# Patient Record
Sex: FEMALE | Race: BLACK OR AFRICAN AMERICAN | NOT HISPANIC OR LATINO
[De-identification: names, ages, dates, MRNs, and addresses within clinical notes are randomized per-mention and may not be internally consistent; named-entity substitution may affect disease eponyms.]

---

## 2017-02-14 ENCOUNTER — APPOINTMENT (OUTPATIENT)
Dept: ELECTROPHYSIOLOGY | Facility: CLINIC | Age: 72
End: 2017-02-14

## 2017-03-27 ENCOUNTER — APPOINTMENT (OUTPATIENT)
Dept: CARDIOLOGY | Facility: CLINIC | Age: 72
End: 2017-03-27

## 2017-03-27 VITALS
BODY MASS INDEX: 32.57 KG/M2 | SYSTOLIC BLOOD PRESSURE: 130 MMHG | HEIGHT: 62 IN | HEART RATE: 93 BPM | WEIGHT: 177 LBS | OXYGEN SATURATION: 94 % | DIASTOLIC BLOOD PRESSURE: 78 MMHG

## 2017-03-27 DIAGNOSIS — Z86.79 PERSONAL HISTORY OF OTHER DISEASES OF THE CIRCULATORY SYSTEM: ICD-10-CM

## 2017-04-05 ENCOUNTER — INPATIENT (INPATIENT)
Facility: HOSPITAL | Age: 72
LOS: 1 days | Discharge: HOME | DRG: 202 | End: 2017-04-07
Attending: INTERNAL MEDICINE | Admitting: INTERNAL MEDICINE
Payer: MEDICARE

## 2017-04-05 VITALS
OXYGEN SATURATION: 95 % | HEART RATE: 102 BPM | RESPIRATION RATE: 22 BRPM | TEMPERATURE: 98 F | DIASTOLIC BLOOD PRESSURE: 87 MMHG | SYSTOLIC BLOOD PRESSURE: 157 MMHG

## 2017-04-05 DIAGNOSIS — J45.901 UNSPECIFIED ASTHMA WITH (ACUTE) EXACERBATION: ICD-10-CM

## 2017-04-05 DIAGNOSIS — I10 ESSENTIAL (PRIMARY) HYPERTENSION: ICD-10-CM

## 2017-04-05 DIAGNOSIS — Z41.8 ENCOUNTER FOR OTHER PROCEDURES FOR PURPOSES OTHER THAN REMEDYING HEALTH STATE: ICD-10-CM

## 2017-04-05 DIAGNOSIS — E11.9 TYPE 2 DIABETES MELLITUS WITHOUT COMPLICATIONS: ICD-10-CM

## 2017-04-05 DIAGNOSIS — I50.22 CHRONIC SYSTOLIC (CONGESTIVE) HEART FAILURE: ICD-10-CM

## 2017-04-05 LAB
ALBUMIN SERPL ELPH-MCNC: 4 G/DL — SIGNIFICANT CHANGE UP (ref 3.3–5)
ALP SERPL-CCNC: 110 U/L — SIGNIFICANT CHANGE UP (ref 40–120)
ALT FLD-CCNC: 15 U/L RC — SIGNIFICANT CHANGE UP (ref 10–45)
ANION GAP SERPL CALC-SCNC: 14 MMOL/L — SIGNIFICANT CHANGE UP (ref 5–17)
APTT BLD: 26.1 SEC — LOW (ref 27.5–37.4)
AST SERPL-CCNC: 17 U/L — SIGNIFICANT CHANGE UP (ref 10–40)
BASOPHILS # BLD AUTO: 0 K/UL — SIGNIFICANT CHANGE UP (ref 0–0.2)
BASOPHILS NFR BLD AUTO: 0 % — SIGNIFICANT CHANGE UP (ref 0–2)
BILIRUB SERPL-MCNC: 0.4 MG/DL — SIGNIFICANT CHANGE UP (ref 0.2–1.2)
BUN SERPL-MCNC: 23 MG/DL — SIGNIFICANT CHANGE UP (ref 7–23)
CALCIUM SERPL-MCNC: 9.4 MG/DL — SIGNIFICANT CHANGE UP (ref 8.4–10.5)
CHLORIDE SERPL-SCNC: 101 MMOL/L — SIGNIFICANT CHANGE UP (ref 96–108)
CO2 SERPL-SCNC: 23 MMOL/L — SIGNIFICANT CHANGE UP (ref 22–31)
CREAT SERPL-MCNC: 0.83 MG/DL — SIGNIFICANT CHANGE UP (ref 0.5–1.3)
EOSINOPHIL # BLD AUTO: 0 K/UL — SIGNIFICANT CHANGE UP (ref 0–0.5)
EOSINOPHIL NFR BLD AUTO: 0.1 % — SIGNIFICANT CHANGE UP (ref 0–6)
GAS PNL BLDV: SIGNIFICANT CHANGE UP
GLUCOSE SERPL-MCNC: 291 MG/DL — HIGH (ref 70–99)
HCT VFR BLD CALC: 37 % — SIGNIFICANT CHANGE UP (ref 34.5–45)
HGB BLD-MCNC: 12.3 G/DL — SIGNIFICANT CHANGE UP (ref 11.5–15.5)
INR BLD: 1.04 RATIO — SIGNIFICANT CHANGE UP (ref 0.88–1.16)
LYMPHOCYTES # BLD AUTO: 1.2 K/UL — SIGNIFICANT CHANGE UP (ref 1–3.3)
LYMPHOCYTES # BLD AUTO: 12.9 % — LOW (ref 13–44)
MAGNESIUM SERPL-MCNC: 1.9 MG/DL — SIGNIFICANT CHANGE UP (ref 1.6–2.6)
MCHC RBC-ENTMCNC: 32 PG — SIGNIFICANT CHANGE UP (ref 27–34)
MCHC RBC-ENTMCNC: 33.3 GM/DL — SIGNIFICANT CHANGE UP (ref 32–36)
MCV RBC AUTO: 96 FL — SIGNIFICANT CHANGE UP (ref 80–100)
MONOCYTES # BLD AUTO: 0.4 K/UL — SIGNIFICANT CHANGE UP (ref 0–0.9)
MONOCYTES NFR BLD AUTO: 3.7 % — SIGNIFICANT CHANGE UP (ref 2–14)
NEUTROPHILS # BLD AUTO: 7.9 K/UL — HIGH (ref 1.8–7.4)
NEUTROPHILS NFR BLD AUTO: 83.4 % — HIGH (ref 43–77)
NT-PROBNP SERPL-SCNC: 230 PG/ML — SIGNIFICANT CHANGE UP (ref 0–300)
PHOSPHATE SERPL-MCNC: 3.2 MG/DL — SIGNIFICANT CHANGE UP (ref 2.5–4.5)
PLATELET # BLD AUTO: 152 K/UL — SIGNIFICANT CHANGE UP (ref 150–400)
POTASSIUM SERPL-MCNC: 4.7 MMOL/L — SIGNIFICANT CHANGE UP (ref 3.5–5.3)
POTASSIUM SERPL-SCNC: 4.7 MMOL/L — SIGNIFICANT CHANGE UP (ref 3.5–5.3)
PROT SERPL-MCNC: 7.3 G/DL — SIGNIFICANT CHANGE UP (ref 6–8.3)
PROTHROM AB SERPL-ACNC: 11.2 SEC — SIGNIFICANT CHANGE UP (ref 9.8–12.7)
RAPID RVP RESULT: SIGNIFICANT CHANGE UP
RBC # BLD: 3.86 M/UL — SIGNIFICANT CHANGE UP (ref 3.8–5.2)
RBC # FLD: 13.7 % — SIGNIFICANT CHANGE UP (ref 10.3–14.5)
SODIUM SERPL-SCNC: 138 MMOL/L — SIGNIFICANT CHANGE UP (ref 135–145)
WBC # BLD: 9.5 K/UL — SIGNIFICANT CHANGE UP (ref 3.8–10.5)
WBC # FLD AUTO: 9.5 K/UL — SIGNIFICANT CHANGE UP (ref 3.8–10.5)

## 2017-04-05 PROCEDURE — 71010: CPT | Mod: 26

## 2017-04-05 PROCEDURE — 99285 EMERGENCY DEPT VISIT HI MDM: CPT

## 2017-04-05 PROCEDURE — 99223 1ST HOSP IP/OBS HIGH 75: CPT

## 2017-04-05 RX ORDER — DEXTROSE 50 % IN WATER 50 %
12.5 SYRINGE (ML) INTRAVENOUS ONCE
Qty: 0 | Refills: 0 | Status: DISCONTINUED | OUTPATIENT
Start: 2017-04-05 | End: 2017-04-07

## 2017-04-05 RX ORDER — ASPIRIN/CALCIUM CARB/MAGNESIUM 324 MG
81 TABLET ORAL DAILY
Qty: 0 | Refills: 0 | Status: DISCONTINUED | OUTPATIENT
Start: 2017-04-05 | End: 2017-04-07

## 2017-04-05 RX ORDER — FAMOTIDINE 10 MG/ML
20 INJECTION INTRAVENOUS DAILY
Qty: 0 | Refills: 0 | Status: DISCONTINUED | OUTPATIENT
Start: 2017-04-05 | End: 2017-04-07

## 2017-04-05 RX ORDER — IPRATROPIUM/ALBUTEROL SULFATE 18-103MCG
3 AEROSOL WITH ADAPTER (GRAM) INHALATION ONCE
Qty: 0 | Refills: 0 | Status: COMPLETED | OUTPATIENT
Start: 2017-04-05 | End: 2017-04-05

## 2017-04-05 RX ORDER — INSULIN LISPRO 100/ML
VIAL (ML) SUBCUTANEOUS
Qty: 0 | Refills: 0 | Status: DISCONTINUED | OUTPATIENT
Start: 2017-04-05 | End: 2017-04-07

## 2017-04-05 RX ORDER — SODIUM CHLORIDE 9 MG/ML
1000 INJECTION, SOLUTION INTRAVENOUS
Qty: 0 | Refills: 0 | Status: DISCONTINUED | OUTPATIENT
Start: 2017-04-05 | End: 2017-04-07

## 2017-04-05 RX ORDER — HEPARIN SODIUM 5000 [USP'U]/ML
5000 INJECTION INTRAVENOUS; SUBCUTANEOUS EVERY 12 HOURS
Qty: 0 | Refills: 0 | Status: DISCONTINUED | OUTPATIENT
Start: 2017-04-05 | End: 2017-04-07

## 2017-04-05 RX ORDER — ACETAMINOPHEN 500 MG
650 TABLET ORAL EVERY 6 HOURS
Qty: 0 | Refills: 0 | Status: DISCONTINUED | OUTPATIENT
Start: 2017-04-05 | End: 2017-04-07

## 2017-04-05 RX ORDER — ATORVASTATIN CALCIUM 80 MG/1
20 TABLET, FILM COATED ORAL AT BEDTIME
Qty: 0 | Refills: 0 | Status: DISCONTINUED | OUTPATIENT
Start: 2017-04-05 | End: 2017-04-07

## 2017-04-05 RX ORDER — IPRATROPIUM/ALBUTEROL SULFATE 18-103MCG
3 AEROSOL WITH ADAPTER (GRAM) INHALATION EVERY 6 HOURS
Qty: 0 | Refills: 0 | Status: DISCONTINUED | OUTPATIENT
Start: 2017-04-05 | End: 2017-04-06

## 2017-04-05 RX ORDER — TIOTROPIUM BROMIDE 18 UG/1
1 CAPSULE ORAL; RESPIRATORY (INHALATION) DAILY
Qty: 0 | Refills: 0 | Status: DISCONTINUED | OUTPATIENT
Start: 2017-04-05 | End: 2017-04-07

## 2017-04-05 RX ORDER — DEXAMETHASONE 0.5 MG/5ML
10 ELIXIR ORAL ONCE
Qty: 0 | Refills: 0 | Status: COMPLETED | OUTPATIENT
Start: 2017-04-05 | End: 2017-04-05

## 2017-04-05 RX ORDER — MAGNESIUM SULFATE 500 MG/ML
2 VIAL (ML) INJECTION ONCE
Qty: 0 | Refills: 0 | Status: COMPLETED | OUTPATIENT
Start: 2017-04-05 | End: 2017-04-05

## 2017-04-05 RX ORDER — ALBUTEROL 90 UG/1
2.5 AEROSOL, METERED ORAL
Qty: 0 | Refills: 0 | Status: COMPLETED | OUTPATIENT
Start: 2017-04-05 | End: 2017-04-05

## 2017-04-05 RX ORDER — INSULIN GLARGINE 100 [IU]/ML
6 INJECTION, SOLUTION SUBCUTANEOUS AT BEDTIME
Qty: 0 | Refills: 0 | Status: DISCONTINUED | OUTPATIENT
Start: 2017-04-05 | End: 2017-04-07

## 2017-04-05 RX ORDER — DEXTROSE 50 % IN WATER 50 %
25 SYRINGE (ML) INTRAVENOUS ONCE
Qty: 0 | Refills: 0 | Status: DISCONTINUED | OUTPATIENT
Start: 2017-04-05 | End: 2017-04-07

## 2017-04-05 RX ORDER — LORATADINE 10 MG/1
10 TABLET ORAL DAILY
Qty: 0 | Refills: 0 | Status: DISCONTINUED | OUTPATIENT
Start: 2017-04-05 | End: 2017-04-07

## 2017-04-05 RX ORDER — GLUCAGON INJECTION, SOLUTION 0.5 MG/.1ML
1 INJECTION, SOLUTION SUBCUTANEOUS ONCE
Qty: 0 | Refills: 0 | Status: DISCONTINUED | OUTPATIENT
Start: 2017-04-05 | End: 2017-04-07

## 2017-04-05 RX ORDER — DEXTROSE 50 % IN WATER 50 %
1 SYRINGE (ML) INTRAVENOUS ONCE
Qty: 0 | Refills: 0 | Status: DISCONTINUED | OUTPATIENT
Start: 2017-04-05 | End: 2017-04-07

## 2017-04-05 RX ADMIN — ALBUTEROL 2.5 MILLIGRAM(S): 90 AEROSOL, METERED ORAL at 16:39

## 2017-04-05 RX ADMIN — Medication 50 GRAM(S): at 17:00

## 2017-04-05 RX ADMIN — INSULIN GLARGINE 6 UNIT(S): 100 INJECTION, SOLUTION SUBCUTANEOUS at 22:27

## 2017-04-05 RX ADMIN — ALBUTEROL 2.5 MILLIGRAM(S): 90 AEROSOL, METERED ORAL at 16:52

## 2017-04-05 RX ADMIN — ALBUTEROL 2.5 MILLIGRAM(S): 90 AEROSOL, METERED ORAL at 17:00

## 2017-04-05 RX ADMIN — ATORVASTATIN CALCIUM 20 MILLIGRAM(S): 80 TABLET, FILM COATED ORAL at 22:28

## 2017-04-05 RX ADMIN — Medication 3 MILLILITER(S): at 15:56

## 2017-04-05 RX ADMIN — Medication 4: at 22:27

## 2017-04-05 RX ADMIN — Medication 3 MILLILITER(S): at 23:47

## 2017-04-05 RX ADMIN — Medication 102 MILLIGRAM(S): at 16:10

## 2017-04-05 RX ADMIN — Medication 10 MILLIGRAM(S): at 22:27

## 2017-04-05 RX ADMIN — HEPARIN SODIUM 5000 UNIT(S): 5000 INJECTION INTRAVENOUS; SUBCUTANEOUS at 22:28

## 2017-04-05 NOTE — H&P ADULT. - HISTORY OF PRESENT ILLNESS
Initial ED vitals--Afebrile,  157/87 22 98%   Labs notable for no leukocytosis WBC 9.5 Hb 12.3 Plt 152; BMP Cr 0.83 K 4.7 LFTs wnl; RVP negative; VBG with lactate 2.5 CO2 44  Imaging notable for CXR with R. sided volume loss with pleural thickening vs. effusion.     In the ED, she received     Admitted to medicine for further evaluation. 71 yo woman w/PMHx of sCHF, asthma s/p hx of respiratory failure s/p trach now reversed, DM, and HTN presenting with dyspnea for the past week. She reports noting notable wheezing and SOB since the weather has been frequently changing from hot to cold. She reports dyspnea over the past week with worsening over the past 2 days. She denied any associated CP, pleuritic CP, nausea/vomiting, lightheadedness, palpitations or syncope. She reports significant worsening over the 24hrs. She stated that she saw her pulmonologist yesterday and was started on prednisone 50mg and took 20mg today without significant improvement. She reported frequent albuterol use without improvement prompting her to present to the ED.      Initial ED vitals--Afebrile,  157/87 22 98%   Labs notable for no leukocytosis WBC 9.5 Hb 12.3 Plt 152; BMP Cr 0.83 K 4.7 LFTs wnl; RVP negative; VBG with lactate 2.5 CO2 44  Imaging notable for CXR with R. sided volume loss with pleural thickening vs. effusion.     In the ED, she received     Admitted to medicine for further evaluation.

## 2017-04-05 NOTE — H&P ADULT. - PSH
AICD (Automatic Cardioverter/Defibrillator) Present  inserted in Aug, 2008. Due for battery change in 1 month. ( [employer]) . Inserted by Dr Duffy  S/P Cholecystectomy    S/P cholecystectomy

## 2017-04-05 NOTE — H&P ADULT. - OTHER
Reviewed previous records in [place]--TTE done 5/15 demonstrated combined diastolic and systolic dysfunction EF 35%. Reviewed previous records in [facility]--TTE done 5/15 demonstrated combined diastolic and systolic dysfunction EF 35%. Patient reported that she is a [employer] and unable to receive any transfusion blood products. We also discussed her GOC--She reports that she is full code and would accept resuscitating efforts. She reports that if she was intubated and requiring mechanical support she would want it to be discontinued if recovery was thought to be prolonged or unlikely.

## 2017-04-05 NOTE — H&P ADULT. - PROBLEM SELECTOR PLAN 1
Unclear trigger. No leukocytosis, afebrile, HDS. RVP negative. CXR with R. sided volume loss with pleural thickening vs. pulm effusion.   - c/w prednisone 40mg qd   - Nebs RTC   - Supplemental O2 as needed  - Pt reportedly was going to follow up with Dr. Riley, pulm; consider consult if fails to improve

## 2017-04-05 NOTE — H&P ADULT. - PROBLEM SELECTOR PLAN 4
Pt follows with Dr. Hobson  - c/w OP HF regimen and monitor volume status  - Per patient she is scheduled to have TTE at next visit and is requesting possible echo while inpatient  - Notify Dr. Hobson of admission in am.

## 2017-04-05 NOTE — ED ADULT NURSE NOTE - OBJECTIVE STATEMENT
72y female pt, hx of asthma and intubation in the past, BIBA c/o worsening sob with wheezing. Pt states that pt recently started on Prednisone, home Neb was not helping. No fever/chills, no chest pain, b/l wheeze noted, pt received 1xduoneb by EMS. +cough with clear sputum.

## 2017-04-05 NOTE — ED PROVIDER NOTE - OBJECTIVE STATEMENT
72F hx asthma, hx respiratory failure sp trach and peg now reversed, bib son and daughter co difficulty breathing since beginning of this week, seen by pulmonologist yesterday started on prednisone took 50mg yesterday and 20mg today, used inhaler about 2 hours ago and still feeling diff breathing.  Given 1 neb by ems pta.    PMD Adv Care Logan Chaudhary 72F hx asthma, hx respiratory failure sp trach and peg now reversed, bib son and daughter co difficulty breathing since beginning of this week, seen by pulmonologist yesterday started on prednisone took 50mg yesterday and 20mg today, used inhaler about 2 hours ago and still feeling diff breathing.  Given 1 neb by ems pta.  No fever, no vomiting, no chest pain.      PMD Adv Care Logan Chaudhary

## 2017-04-05 NOTE — ED PROVIDER NOTE - PMH
Asthma    Cardiac Pacemaker    Cardiomyopathy    Congestive Heart Failure    COPD (chronic obstructive pulmonary disease)    Diabetes    Diverticulitis    Gout    HTN - Hypertension    Kidney stone

## 2017-04-05 NOTE — H&P ADULT. - CONSTITUTIONAL COMMENTS
elderly woman sitting up comfortably in no distress, able to speak in full sentences without difficulty

## 2017-04-05 NOTE — H&P ADULT. - RADIOLOGY RESULTS AND INTERPRETATION
Personally reviewed imaging. Imaging notable for CXR with R. sided volume loss with pleural thickening vs. effusion.

## 2017-04-05 NOTE — ED PROVIDER NOTE - ATTENDING CONTRIBUTION TO CARE
I have seen and evaluated this patient with the resident.   I agree with the findings  unless other wise stated.  I have made appropriate changes in documentations where needed, After my face to face bedside evaluation, I am further  noting: pt with long standing RAD had intubation last year wheezing not significant change,  admission for definitive care.-Fleming

## 2017-04-05 NOTE — H&P ADULT. - LAB RESULTS AND INTERPRETATION
Personally reviewed labs. Labs notable for no leukocytosis WBC 9.5 Hb 12.3 Plt 152; BMP Cr 0.83 K 4.7 LFTs wnl; RVP negative; VBG with lactate 2.5 CO2 44

## 2017-04-05 NOTE — ED PROVIDER NOTE - PSH
AICD (Automatic Cardioverter/Defibrillator) Present  inserted in Aug, 2008. Due for battery change in 1 month. ( [employer]) . Inserted by Dr Duffy  S/P Cholecystectomy    S/P cholecystectomy

## 2017-04-06 ENCOUNTER — TRANSCRIPTION ENCOUNTER (OUTPATIENT)
Age: 72
End: 2017-04-06

## 2017-04-06 LAB
ANION GAP SERPL CALC-SCNC: 15 MMOL/L — SIGNIFICANT CHANGE UP (ref 5–17)
BASOPHILS # BLD AUTO: 0.01 K/UL — SIGNIFICANT CHANGE UP (ref 0–0.2)
BASOPHILS NFR BLD AUTO: 0.1 % — SIGNIFICANT CHANGE UP (ref 0–2)
BUN SERPL-MCNC: 23 MG/DL — SIGNIFICANT CHANGE UP (ref 7–23)
CALCIUM SERPL-MCNC: 8.9 MG/DL — SIGNIFICANT CHANGE UP (ref 8.4–10.5)
CHLORIDE SERPL-SCNC: 97 MMOL/L — SIGNIFICANT CHANGE UP (ref 96–108)
CO2 SERPL-SCNC: 23 MMOL/L — SIGNIFICANT CHANGE UP (ref 22–31)
CREAT SERPL-MCNC: 0.81 MG/DL — SIGNIFICANT CHANGE UP (ref 0.5–1.3)
EOSINOPHIL # BLD AUTO: 0 K/UL — SIGNIFICANT CHANGE UP (ref 0–0.5)
EOSINOPHIL NFR BLD AUTO: 0 % — SIGNIFICANT CHANGE UP (ref 0–6)
GLUCOSE SERPL-MCNC: 324 MG/DL — HIGH (ref 70–99)
HBA1C BLD-MCNC: 8 % — HIGH (ref 4–5.6)
HCT VFR BLD CALC: 32.9 % — LOW (ref 34.5–45)
HGB BLD-MCNC: 10.9 G/DL — LOW (ref 11.5–15.5)
IMM GRANULOCYTES NFR BLD AUTO: 0.2 % — SIGNIFICANT CHANGE UP (ref 0–1.5)
LYMPHOCYTES # BLD AUTO: 0.97 K/UL — LOW (ref 1–3.3)
LYMPHOCYTES # BLD AUTO: 11.2 % — LOW (ref 13–44)
MAGNESIUM SERPL-MCNC: 2.1 MG/DL — SIGNIFICANT CHANGE UP (ref 1.6–2.6)
MCHC RBC-ENTMCNC: 31.1 PG — SIGNIFICANT CHANGE UP (ref 27–34)
MCHC RBC-ENTMCNC: 33.1 GM/DL — SIGNIFICANT CHANGE UP (ref 32–36)
MCV RBC AUTO: 93.7 FL — SIGNIFICANT CHANGE UP (ref 80–100)
MONOCYTES # BLD AUTO: 0.36 K/UL — SIGNIFICANT CHANGE UP (ref 0–0.9)
MONOCYTES NFR BLD AUTO: 4.2 % — SIGNIFICANT CHANGE UP (ref 2–14)
NEUTROPHILS # BLD AUTO: 7.29 K/UL — SIGNIFICANT CHANGE UP (ref 1.8–7.4)
NEUTROPHILS NFR BLD AUTO: 84.3 % — HIGH (ref 43–77)
PLATELET # BLD AUTO: 168 K/UL — SIGNIFICANT CHANGE UP (ref 150–400)
POTASSIUM SERPL-MCNC: 4.6 MMOL/L — SIGNIFICANT CHANGE UP (ref 3.5–5.3)
POTASSIUM SERPL-SCNC: 4.6 MMOL/L — SIGNIFICANT CHANGE UP (ref 3.5–5.3)
RBC # BLD: 3.51 M/UL — LOW (ref 3.8–5.2)
RBC # FLD: 14.7 % — HIGH (ref 10.3–14.5)
SODIUM SERPL-SCNC: 135 MMOL/L — SIGNIFICANT CHANGE UP (ref 135–145)
WBC # BLD: 8.65 K/UL — SIGNIFICANT CHANGE UP (ref 3.8–10.5)
WBC # FLD AUTO: 8.65 K/UL — SIGNIFICANT CHANGE UP (ref 3.8–10.5)

## 2017-04-06 RX ORDER — IPRATROPIUM/ALBUTEROL SULFATE 18-103MCG
3 AEROSOL WITH ADAPTER (GRAM) INHALATION ONCE
Qty: 0 | Refills: 0 | Status: COMPLETED | OUTPATIENT
Start: 2017-04-06 | End: 2017-04-06

## 2017-04-06 RX ORDER — TIOTROPIUM BROMIDE 18 UG/1
1 CAPSULE ORAL; RESPIRATORY (INHALATION) DAILY
Qty: 0 | Refills: 0 | Status: DISCONTINUED | OUTPATIENT
Start: 2017-04-06 | End: 2017-04-06

## 2017-04-06 RX ORDER — ALBUTEROL 90 UG/1
1 AEROSOL, METERED ORAL EVERY 4 HOURS
Qty: 0 | Refills: 0 | Status: DISCONTINUED | OUTPATIENT
Start: 2017-04-06 | End: 2017-04-06

## 2017-04-06 RX ORDER — FLUTICASONE PROPIONATE AND SALMETEROL 50; 250 UG/1; UG/1
1 POWDER ORAL; RESPIRATORY (INHALATION)
Qty: 0 | Refills: 0 | Status: DISCONTINUED | OUTPATIENT
Start: 2017-04-06 | End: 2017-04-07

## 2017-04-06 RX ORDER — IPRATROPIUM/ALBUTEROL SULFATE 18-103MCG
3 AEROSOL WITH ADAPTER (GRAM) INHALATION EVERY 6 HOURS
Qty: 0 | Refills: 0 | Status: DISCONTINUED | OUTPATIENT
Start: 2017-04-06 | End: 2017-04-07

## 2017-04-06 RX ADMIN — Medication 3 MILLILITER(S): at 14:39

## 2017-04-06 RX ADMIN — HEPARIN SODIUM 5000 UNIT(S): 5000 INJECTION INTRAVENOUS; SUBCUTANEOUS at 17:13

## 2017-04-06 RX ADMIN — Medication 10 MILLIGRAM(S): at 05:23

## 2017-04-06 RX ADMIN — Medication 4: at 17:07

## 2017-04-06 RX ADMIN — Medication 81 MILLIGRAM(S): at 11:51

## 2017-04-06 RX ADMIN — Medication 10 MILLIGRAM(S): at 17:13

## 2017-04-06 RX ADMIN — Medication 2: at 11:52

## 2017-04-06 RX ADMIN — INSULIN GLARGINE 6 UNIT(S): 100 INJECTION, SOLUTION SUBCUTANEOUS at 21:29

## 2017-04-06 RX ADMIN — Medication 3: at 08:17

## 2017-04-06 RX ADMIN — Medication 30 MILLIGRAM(S): at 10:59

## 2017-04-06 RX ADMIN — ATORVASTATIN CALCIUM 20 MILLIGRAM(S): 80 TABLET, FILM COATED ORAL at 21:28

## 2017-04-06 RX ADMIN — Medication 3 MILLILITER(S): at 21:28

## 2017-04-06 RX ADMIN — Medication 30 MILLIGRAM(S): at 21:28

## 2017-04-06 RX ADMIN — FLUTICASONE PROPIONATE AND SALMETEROL 1 DOSE(S): 50; 250 POWDER ORAL; RESPIRATORY (INHALATION) at 17:13

## 2017-04-06 RX ADMIN — Medication 3 MILLILITER(S): at 10:15

## 2017-04-06 RX ADMIN — HEPARIN SODIUM 5000 UNIT(S): 5000 INJECTION INTRAVENOUS; SUBCUTANEOUS at 05:23

## 2017-04-06 RX ADMIN — LORATADINE 10 MILLIGRAM(S): 10 TABLET ORAL at 11:51

## 2017-04-06 RX ADMIN — FAMOTIDINE 20 MILLIGRAM(S): 10 INJECTION INTRAVENOUS at 11:51

## 2017-04-06 NOTE — DISCHARGE NOTE ADULT - PLAN OF CARE
resolution of symptoms Asthma attacks happen when the airways in the lungs become narrow and inflamed  Asthma symptoms can include - Wheezing or noisy breathing, coughing, tight feeling in the chest, shortness of breath  Almost everyone with asthma has a quick-relief inhaler that works in 5- 10mins that they carry with them and long-term controller medicines control asthma and prevent future symptoms. People with frequent asthma symptoms take these 1 or 2 times each day.  You can stay away from things that cause your symptoms or make them worse. Doctors call these "triggers."  avoid them as much as possible. Some common triggers include - Dust, mold, animals, such as dogs and cats, pollen and plants, cigarette smoke, getting sick with a cold or flu (that's why it's important to get a flu shot), stress  Talk to your caregiver about an action plan for managing asthma attacks. This includes the use of a peak flow meter which measures the severity of the attack and medicines that can help stop the attack.   SEEK MEDICAL CARE IF:  You have wheezing, shortness of breath, or a cough even if taking medicine to prevent attacks.   You have thickening of sputum, sputum changes from clear or white to yellow, green, gray, or bloody.   You have any problems that may be related to the medicines you are taking (such as a rash, itching, swelling, or trouble breathing).  You are using a reliever medicine more than 2–3 times per week.  Your peak flow is still at 50–79% of personal best after following your action plan for 1 hour.  SEEK IMMEDIATE MEDICAL CARE IF:  You are short of breath even at rest,or with very little physical activity, chest pain, heart beating fast, bluish color to your lips or fingernails, fever, dizziness,   You seem to be getting worse and are unresponsive to treatment during an asthma attack.   Your peak flow is less than 50% of personal best. HgA1C this admission - 8.0  Make sure you get your HgA1c checked every three months.  If you take oral diabetes medications, check your blood glucose two times a day.  If you take insulin, check your blood glucose before meals and at bedtime.  It's important not to skip any meals.  Keep a log of your blood glucose results and always take it with you to your doctor appointments.  Keep a list of your current medications including injectables and over the counter medications and bring this medication list with you to all your doctor appointments.  If you have not seen your ophthalmologist this year call for appointment.  Check your feet daily for redness, sores, or openings. Do not self treat. If no improvement in two days call your primary care physician for an appointment.  Low blood sugar (hypoglycemia) is a blood sugar below 70mg/dl. Check your blood sugar if you feel signs/symptoms of hypoglycemia. If your blood sugar is below 70 take 15 grams of carbohydrates (ex 4 oz of apple juice, 3-4 glucose tablets, or 4-6 oz of regular soda) wait 15 minutes and repeat blood sugar to make sure it comes up above 70.  If your blood sugar is above 70 and you are due for a meal, have a meal.  If you are not due for a meal have a snack.  This snack helps keeps your blood sugar at a safe range. Take medications for your blood pressure as recommended.  Eat a heart healthy diet that is low in saturated fats and salt, and includes whole grains, fruits, vegetables and lean protein   Exercise regularly (consult with your physician or cardiologist first); maintain a heart healthy weight.   If you smoke - quit (A resource to help you stop smoking is the [facility] – phone number 888-761-0722.). Continue to follow with your primary physician or cardiologist.   Seek medical help for dizziness, Lightheadedness, Blurry vision, Headache, Chest pain, Shortness of breath  Follow up with your medical doctor to establish long term blood pressure treatment goals. Weigh yourself daily.  If you gain 3lbs in 3 days, or 5lbs in a week call your Health Care Provider.  Do not eat or drink foods containing more than 2000mg of salt (sodium) in your diet every day.  Call your Health Care Provider if you have any swelling or increased swelling in your feet, ankles, and/or stomach.  Take all of your medication as directed.  If you become dizzy call your Health Care Provider.

## 2017-04-06 NOTE — DISCHARGE NOTE ADULT - HOSPITAL COURSE
to be completed by attending 73 yo woman w/PMHx of sCHF, asthma s/p hx of respiratory failure s/p trach now reversed, DM, and HTN presenting with dyspnea for the past week. She reports noting notable wheezing and SOB since the weather has been frequently changing from hot to cold. She reports dyspnea over the past week with worsening over the past 2 days. She denied any associated CP, pleuritic CP, nausea/vomiting, lightheadedness, palpitations or syncope. She reports significant worsening over the 24hrs. She stated that she saw her pulmonologist yesterday and was started on prednisone 50mg and took 20mg today without significant improvement. She reported frequent albuterol use without improvement prompting her to present to the ED.   stable on po prednisone, dc planning

## 2017-04-06 NOTE — DISCHARGE NOTE ADULT - CARE PLAN
Principal Discharge DX:	Asthma exacerbation  Goal:	resolution of symptoms  Instructions for follow-up, activity and diet:	Asthma attacks happen when the airways in the lungs become narrow and inflamed  Asthma symptoms can include - Wheezing or noisy breathing, coughing, tight feeling in the chest, shortness of breath  Almost everyone with asthma has a quick-relief inhaler that works in 5- 10mins that they carry with them and long-term controller medicines control asthma and prevent future symptoms. People with frequent asthma symptoms take these 1 or 2 times each day.  You can stay away from things that cause your symptoms or make them worse. Doctors call these "triggers."  avoid them as much as possible. Some common triggers include - Dust, mold, animals, such as dogs and cats, pollen and plants, cigarette smoke, getting sick with a cold or flu (that's why it's important to get a flu shot), stress  Talk to your caregiver about an action plan for managing asthma attacks. This includes the use of a peak flow meter which measures the severity of the attack and medicines that can help stop the attack.   SEEK MEDICAL CARE IF:  You have wheezing, shortness of breath, or a cough even if taking medicine to prevent attacks.   You have thickening of sputum, sputum changes from clear or white to yellow, green, gray, or bloody.   You have any problems that may be related to the medicines you are taking (such as a rash, itching, swelling, or trouble breathing).  You are using a reliever medicine more than 2–3 times per week.  Your peak flow is still at 50–79% of personal best after following your action plan for 1 hour.  SEEK IMMEDIATE MEDICAL CARE IF:  You are short of breath even at rest,or with very little physical activity, chest pain, heart beating fast, bluish color to your lips or fingernails, fever, dizziness,   You seem to be getting worse and are unresponsive to treatment during an asthma attack.   Your peak flow is less than 50% of personal best.  Secondary Diagnosis:	Essential hypertension  Instructions for follow-up, activity and diet:	Take medications for your blood pressure as recommended.  Eat a heart healthy diet that is low in saturated fats and salt, and includes whole grains, fruits, vegetables and lean protein   Exercise regularly (consult with your physician or cardiologist first); maintain a heart healthy weight.   If you smoke - quit (A resource to help you stop smoking is the [facility] – phone number 137-724-3780.). Continue to follow with your primary physician or cardiologist.   Seek medical help for dizziness, Lightheadedness, Blurry vision, Headache, Chest pain, Shortness of breath  Follow up with your medical doctor to establish long term blood pressure treatment goals.  Secondary Diagnosis:	Type 2 diabetes mellitus without complication, with long-term current use of insulin  Instructions for follow-up, activity and diet:	HgA1C this admission - 8.0  Make sure you get your HgA1c checked every three months.  If you take oral diabetes medications, check your blood glucose two times a day.  If you take insulin, check your blood glucose before meals and at bedtime.  It's important not to skip any meals.  Keep a log of your blood glucose results and always take it with you to your doctor appointments.  Keep a list of your current medications including injectables and over the counter medications and bring this medication list with you to all your doctor appointments.  If you have not seen your ophthalmologist this year call for appointment.  Check your feet daily for redness, sores, or openings. Do not self treat. If no improvement in two days call your primary care physician for an appointment.  Low blood sugar (hypoglycemia) is a blood sugar below 70mg/dl. Check your blood sugar if you feel signs/symptoms of hypoglycemia. If your blood sugar is below 70 take 15 grams of carbohydrates (ex 4 oz of apple juice, 3-4 glucose tablets, or 4-6 oz of regular soda) wait 15 minutes and repeat blood sugar to make sure it comes up above 70.  If your blood sugar is above 70 and you are due for a meal, have a meal.  If you are not due for a meal have a snack.  This snack helps keeps your blood sugar at a safe range. Principal Discharge DX:	Asthma exacerbation  Goal:	resolution of symptoms  Instructions for follow-up, activity and diet:	Asthma attacks happen when the airways in the lungs become narrow and inflamed  Asthma symptoms can include - Wheezing or noisy breathing, coughing, tight feeling in the chest, shortness of breath  Almost everyone with asthma has a quick-relief inhaler that works in 5- 10mins that they carry with them and long-term controller medicines control asthma and prevent future symptoms. People with frequent asthma symptoms take these 1 or 2 times each day.  You can stay away from things that cause your symptoms or make them worse. Doctors call these "triggers."  avoid them as much as possible. Some common triggers include - Dust, mold, animals, such as dogs and cats, pollen and plants, cigarette smoke, getting sick with a cold or flu (that's why it's important to get a flu shot), stress  Talk to your caregiver about an action plan for managing asthma attacks. This includes the use of a peak flow meter which measures the severity of the attack and medicines that can help stop the attack.   SEEK MEDICAL CARE IF:  You have wheezing, shortness of breath, or a cough even if taking medicine to prevent attacks.   You have thickening of sputum, sputum changes from clear or white to yellow, green, gray, or bloody.   You have any problems that may be related to the medicines you are taking (such as a rash, itching, swelling, or trouble breathing).  You are using a reliever medicine more than 2–3 times per week.  Your peak flow is still at 50–79% of personal best after following your action plan for 1 hour.  SEEK IMMEDIATE MEDICAL CARE IF:  You are short of breath even at rest,or with very little physical activity, chest pain, heart beating fast, bluish color to your lips or fingernails, fever, dizziness,   You seem to be getting worse and are unresponsive to treatment during an asthma attack.   Your peak flow is less than 50% of personal best.  Secondary Diagnosis:	Essential hypertension  Instructions for follow-up, activity and diet:	Take medications for your blood pressure as recommended.  Eat a heart healthy diet that is low in saturated fats and salt, and includes whole grains, fruits, vegetables and lean protein   Exercise regularly (consult with your physician or cardiologist first); maintain a heart healthy weight.   If you smoke - quit (A resource to help you stop smoking is the [facility] – phone number 519-460-6477.). Continue to follow with your primary physician or cardiologist.   Seek medical help for dizziness, Lightheadedness, Blurry vision, Headache, Chest pain, Shortness of breath  Follow up with your medical doctor to establish long term blood pressure treatment goals.  Secondary Diagnosis:	Type 2 diabetes mellitus without complication, with long-term current use of insulin  Instructions for follow-up, activity and diet:	HgA1C this admission - 8.0  Make sure you get your HgA1c checked every three months.  If you take oral diabetes medications, check your blood glucose two times a day.  If you take insulin, check your blood glucose before meals and at bedtime.  It's important not to skip any meals.  Keep a log of your blood glucose results and always take it with you to your doctor appointments.  Keep a list of your current medications including injectables and over the counter medications and bring this medication list with you to all your doctor appointments.  If you have not seen your ophthalmologist this year call for appointment.  Check your feet daily for redness, sores, or openings. Do not self treat. If no improvement in two days call your primary care physician for an appointment.  Low blood sugar (hypoglycemia) is a blood sugar below 70mg/dl. Check your blood sugar if you feel signs/symptoms of hypoglycemia. If your blood sugar is below 70 take 15 grams of carbohydrates (ex 4 oz of apple juice, 3-4 glucose tablets, or 4-6 oz of regular soda) wait 15 minutes and repeat blood sugar to make sure it comes up above 70.  If your blood sugar is above 70 and you are due for a meal, have a meal.  If you are not due for a meal have a snack.  This snack helps keeps your blood sugar at a safe range. Principal Discharge DX:	Asthma exacerbation  Goal:	resolution of symptoms  Instructions for follow-up, activity and diet:	Asthma attacks happen when the airways in the lungs become narrow and inflamed  Asthma symptoms can include - Wheezing or noisy breathing, coughing, tight feeling in the chest, shortness of breath  Almost everyone with asthma has a quick-relief inhaler that works in 5- 10mins that they carry with them and long-term controller medicines control asthma and prevent future symptoms. People with frequent asthma symptoms take these 1 or 2 times each day.  You can stay away from things that cause your symptoms or make them worse. Doctors call these "triggers."  avoid them as much as possible. Some common triggers include - Dust, mold, animals, such as dogs and cats, pollen and plants, cigarette smoke, getting sick with a cold or flu (that's why it's important to get a flu shot), stress  Talk to your caregiver about an action plan for managing asthma attacks. This includes the use of a peak flow meter which measures the severity of the attack and medicines that can help stop the attack.   SEEK MEDICAL CARE IF:  You have wheezing, shortness of breath, or a cough even if taking medicine to prevent attacks.   You have thickening of sputum, sputum changes from clear or white to yellow, green, gray, or bloody.   You have any problems that may be related to the medicines you are taking (such as a rash, itching, swelling, or trouble breathing).  You are using a reliever medicine more than 2–3 times per week.  Your peak flow is still at 50–79% of personal best after following your action plan for 1 hour.  SEEK IMMEDIATE MEDICAL CARE IF:  You are short of breath even at rest,or with very little physical activity, chest pain, heart beating fast, bluish color to your lips or fingernails, fever, dizziness,   You seem to be getting worse and are unresponsive to treatment during an asthma attack.   Your peak flow is less than 50% of personal best.  Secondary Diagnosis:	Essential hypertension  Instructions for follow-up, activity and diet:	Take medications for your blood pressure as recommended.  Eat a heart healthy diet that is low in saturated fats and salt, and includes whole grains, fruits, vegetables and lean protein   Exercise regularly (consult with your physician or cardiologist first); maintain a heart healthy weight.   If you smoke - quit (A resource to help you stop smoking is the [facility] – phone number [phone].). Continue to follow with your primary physician or cardiologist.   Seek medical help for dizziness, Lightheadedness, Blurry vision, Headache, Chest pain, Shortness of breath  Follow up with your medical doctor to establish long term blood pressure treatment goals.  Secondary Diagnosis:	Type 2 diabetes mellitus without complication, with long-term current use of insulin  Instructions for follow-up, activity and diet:	HgA1C this admission - 8.0  Make sure you get your HgA1c checked every three months.  If you take oral diabetes medications, check your blood glucose two times a day.  If you take insulin, check your blood glucose before meals and at bedtime.  It's important not to skip any meals.  Keep a log of your blood glucose results and always take it with you to your doctor appointments.  Keep a list of your current medications including injectables and over the counter medications and bring this medication list with you to all your doctor appointments.  If you have not seen your ophthalmologist this year call for appointment.  Check your feet daily for redness, sores, or openings. Do not self treat. If no improvement in two days call your primary care physician for an appointment.  Low blood sugar (hypoglycemia) is a blood sugar below 70mg/dl. Check your blood sugar if you feel signs/symptoms of hypoglycemia. If your blood sugar is below 70 take 15 grams of carbohydrates (ex 4 oz of apple juice, 3-4 glucose tablets, or 4-6 oz of regular soda) wait 15 minutes and repeat blood sugar to make sure it comes up above 70.  If your blood sugar is above 70 and you are due for a meal, have a meal.  If you are not due for a meal have a snack.  This snack helps keeps your blood sugar at a safe range. Principal Discharge DX:	Asthma exacerbation  Goal:	resolution of symptoms  Instructions for follow-up, activity and diet:	Asthma attacks happen when the airways in the lungs become narrow and inflamed  Asthma symptoms can include - Wheezing or noisy breathing, coughing, tight feeling in the chest, shortness of breath  Almost everyone with asthma has a quick-relief inhaler that works in 5- 10mins that they carry with them and long-term controller medicines control asthma and prevent future symptoms. People with frequent asthma symptoms take these 1 or 2 times each day.  You can stay away from things that cause your symptoms or make them worse. Doctors call these "triggers."  avoid them as much as possible. Some common triggers include - Dust, mold, animals, such as dogs and cats, pollen and plants, cigarette smoke, getting sick with a cold or flu (that's why it's important to get a flu shot), stress  Talk to your caregiver about an action plan for managing asthma attacks. This includes the use of a peak flow meter which measures the severity of the attack and medicines that can help stop the attack.   SEEK MEDICAL CARE IF:  You have wheezing, shortness of breath, or a cough even if taking medicine to prevent attacks.   You have thickening of sputum, sputum changes from clear or white to yellow, green, gray, or bloody.   You have any problems that may be related to the medicines you are taking (such as a rash, itching, swelling, or trouble breathing).  You are using a reliever medicine more than 2–3 times per week.  Your peak flow is still at 50–79% of personal best after following your action plan for 1 hour.  SEEK IMMEDIATE MEDICAL CARE IF:  You are short of breath even at rest,or with very little physical activity, chest pain, heart beating fast, bluish color to your lips or fingernails, fever, dizziness,   You seem to be getting worse and are unresponsive to treatment during an asthma attack.   Your peak flow is less than 50% of personal best.  Secondary Diagnosis:	Essential hypertension  Instructions for follow-up, activity and diet:	Take medications for your blood pressure as recommended.  Eat a heart healthy diet that is low in saturated fats and salt, and includes whole grains, fruits, vegetables and lean protein   Exercise regularly (consult with your physician or cardiologist first); maintain a heart healthy weight.   If you smoke - quit (A resource to help you stop smoking is the [facility] – phone number 462-860-8366.). Continue to follow with your primary physician or cardiologist.   Seek medical help for dizziness, Lightheadedness, Blurry vision, Headache, Chest pain, Shortness of breath  Follow up with your medical doctor to establish long term blood pressure treatment goals.  Secondary Diagnosis:	Type 2 diabetes mellitus without complication, with long-term current use of insulin  Instructions for follow-up, activity and diet:	HgA1C this admission - 8.0  Make sure you get your HgA1c checked every three months.  If you take oral diabetes medications, check your blood glucose two times a day.  If you take insulin, check your blood glucose before meals and at bedtime.  It's important not to skip any meals.  Keep a log of your blood glucose results and always take it with you to your doctor appointments.  Keep a list of your current medications including injectables and over the counter medications and bring this medication list with you to all your doctor appointments.  If you have not seen your ophthalmologist this year call for appointment.  Check your feet daily for redness, sores, or openings. Do not self treat. If no improvement in two days call your primary care physician for an appointment.  Low blood sugar (hypoglycemia) is a blood sugar below 70mg/dl. Check your blood sugar if you feel signs/symptoms of hypoglycemia. If your blood sugar is below 70 take 15 grams of carbohydrates (ex 4 oz of apple juice, 3-4 glucose tablets, or 4-6 oz of regular soda) wait 15 minutes and repeat blood sugar to make sure it comes up above 70.  If your blood sugar is above 70 and you are due for a meal, have a meal.  If you are not due for a meal have a snack.  This snack helps keeps your blood sugar at a safe range.  Secondary Diagnosis:	Chronic systolic congestive heart failure  Instructions for follow-up, activity and diet:	Weigh yourself daily.  If you gain 3lbs in 3 days, or 5lbs in a week call your Health Care Provider.  Do not eat or drink foods containing more than 2000mg of salt (sodium) in your diet every day.  Call your Health Care Provider if you have any swelling or increased swelling in your feet, ankles, and/or stomach.  Take all of your medication as directed.  If you become dizzy call your Health Care Provider. Principal Discharge DX:	Asthma exacerbation  Goal:	resolution of symptoms  Instructions for follow-up, activity and diet:	Asthma attacks happen when the airways in the lungs become narrow and inflamed  Asthma symptoms can include - Wheezing or noisy breathing, coughing, tight feeling in the chest, shortness of breath  Almost everyone with asthma has a quick-relief inhaler that works in 5- 10mins that they carry with them and long-term controller medicines control asthma and prevent future symptoms. People with frequent asthma symptoms take these 1 or 2 times each day.  You can stay away from things that cause your symptoms or make them worse. Doctors call these "triggers."  avoid them as much as possible. Some common triggers include - Dust, mold, animals, such as dogs and cats, pollen and plants, cigarette smoke, getting sick with a cold or flu (that's why it's important to get a flu shot), stress  Talk to your caregiver about an action plan for managing asthma attacks. This includes the use of a peak flow meter which measures the severity of the attack and medicines that can help stop the attack.   SEEK MEDICAL CARE IF:  You have wheezing, shortness of breath, or a cough even if taking medicine to prevent attacks.   You have thickening of sputum, sputum changes from clear or white to yellow, green, gray, or bloody.   You have any problems that may be related to the medicines you are taking (such as a rash, itching, swelling, or trouble breathing).  You are using a reliever medicine more than 2–3 times per week.  Your peak flow is still at 50–79% of personal best after following your action plan for 1 hour.  SEEK IMMEDIATE MEDICAL CARE IF:  You are short of breath even at rest,or with very little physical activity, chest pain, heart beating fast, bluish color to your lips or fingernails, fever, dizziness,   You seem to be getting worse and are unresponsive to treatment during an asthma attack.   Your peak flow is less than 50% of personal best.  Secondary Diagnosis:	Essential hypertension  Instructions for follow-up, activity and diet:	Take medications for your blood pressure as recommended.  Eat a heart healthy diet that is low in saturated fats and salt, and includes whole grains, fruits, vegetables and lean protein   Exercise regularly (consult with your physician or cardiologist first); maintain a heart healthy weight.   If you smoke - quit (A resource to help you stop smoking is the [facility] – phone number 719-841-7403.). Continue to follow with your primary physician or cardiologist.   Seek medical help for dizziness, Lightheadedness, Blurry vision, Headache, Chest pain, Shortness of breath  Follow up with your medical doctor to establish long term blood pressure treatment goals.  Secondary Diagnosis:	Type 2 diabetes mellitus without complication, with long-term current use of insulin  Instructions for follow-up, activity and diet:	HgA1C this admission - 8.0  Make sure you get your HgA1c checked every three months.  If you take oral diabetes medications, check your blood glucose two times a day.  If you take insulin, check your blood glucose before meals and at bedtime.  It's important not to skip any meals.  Keep a log of your blood glucose results and always take it with you to your doctor appointments.  Keep a list of your current medications including injectables and over the counter medications and bring this medication list with you to all your doctor appointments.  If you have not seen your ophthalmologist this year call for appointment.  Check your feet daily for redness, sores, or openings. Do not self treat. If no improvement in two days call your primary care physician for an appointment.  Low blood sugar (hypoglycemia) is a blood sugar below 70mg/dl. Check your blood sugar if you feel signs/symptoms of hypoglycemia. If your blood sugar is below 70 take 15 grams of carbohydrates (ex 4 oz of apple juice, 3-4 glucose tablets, or 4-6 oz of regular soda) wait 15 minutes and repeat blood sugar to make sure it comes up above 70.  If your blood sugar is above 70 and you are due for a meal, have a meal.  If you are not due for a meal have a snack.  This snack helps keeps your blood sugar at a safe range.  Secondary Diagnosis:	Chronic systolic congestive heart failure  Instructions for follow-up, activity and diet:	Weigh yourself daily.  If you gain 3lbs in 3 days, or 5lbs in a week call your Health Care Provider.  Do not eat or drink foods containing more than 2000mg of salt (sodium) in your diet every day.  Call your Health Care Provider if you have any swelling or increased swelling in your feet, ankles, and/or stomach.  Take all of your medication as directed.  If you become dizzy call your Health Care Provider. Principal Discharge DX:	Asthma exacerbation  Goal:	resolution of symptoms  Instructions for follow-up, activity and diet:	Asthma attacks happen when the airways in the lungs become narrow and inflamed  Asthma symptoms can include - Wheezing or noisy breathing, coughing, tight feeling in the chest, shortness of breath  Almost everyone with asthma has a quick-relief inhaler that works in 5- 10mins that they carry with them and long-term controller medicines control asthma and prevent future symptoms. People with frequent asthma symptoms take these 1 or 2 times each day.  You can stay away from things that cause your symptoms or make them worse. Doctors call these "triggers."  avoid them as much as possible. Some common triggers include - Dust, mold, animals, such as dogs and cats, pollen and plants, cigarette smoke, getting sick with a cold or flu (that's why it's important to get a flu shot), stress  Talk to your caregiver about an action plan for managing asthma attacks. This includes the use of a peak flow meter which measures the severity of the attack and medicines that can help stop the attack.   SEEK MEDICAL CARE IF:  You have wheezing, shortness of breath, or a cough even if taking medicine to prevent attacks.   You have thickening of sputum, sputum changes from clear or white to yellow, green, gray, or bloody.   You have any problems that may be related to the medicines you are taking (such as a rash, itching, swelling, or trouble breathing).  You are using a reliever medicine more than 2–3 times per week.  Your peak flow is still at 50–79% of personal best after following your action plan for 1 hour.  SEEK IMMEDIATE MEDICAL CARE IF:  You are short of breath even at rest,or with very little physical activity, chest pain, heart beating fast, bluish color to your lips or fingernails, fever, dizziness,   You seem to be getting worse and are unresponsive to treatment during an asthma attack.   Your peak flow is less than 50% of personal best.  Secondary Diagnosis:	Essential hypertension  Instructions for follow-up, activity and diet:	Take medications for your blood pressure as recommended.  Eat a heart healthy diet that is low in saturated fats and salt, and includes whole grains, fruits, vegetables and lean protein   Exercise regularly (consult with your physician or cardiologist first); maintain a heart healthy weight.   If you smoke - quit (A resource to help you stop smoking is the [facility] – phone number [phone].). Continue to follow with your primary physician or cardiologist.   Seek medical help for dizziness, Lightheadedness, Blurry vision, Headache, Chest pain, Shortness of breath  Follow up with your medical doctor to establish long term blood pressure treatment goals.  Secondary Diagnosis:	Type 2 diabetes mellitus without complication, with long-term current use of insulin  Instructions for follow-up, activity and diet:	HgA1C this admission - 8.0  Make sure you get your HgA1c checked every three months.  If you take oral diabetes medications, check your blood glucose two times a day.  If you take insulin, check your blood glucose before meals and at bedtime.  It's important not to skip any meals.  Keep a log of your blood glucose results and always take it with you to your doctor appointments.  Keep a list of your current medications including injectables and over the counter medications and bring this medication list with you to all your doctor appointments.  If you have not seen your ophthalmologist this year call for appointment.  Check your feet daily for redness, sores, or openings. Do not self treat. If no improvement in two days call your primary care physician for an appointment.  Low blood sugar (hypoglycemia) is a blood sugar below 70mg/dl. Check your blood sugar if you feel signs/symptoms of hypoglycemia. If your blood sugar is below 70 take 15 grams of carbohydrates (ex 4 oz of apple juice, 3-4 glucose tablets, or 4-6 oz of regular soda) wait 15 minutes and repeat blood sugar to make sure it comes up above 70.  If your blood sugar is above 70 and you are due for a meal, have a meal.  If you are not due for a meal have a snack.  This snack helps keeps your blood sugar at a safe range.  Secondary Diagnosis:	Chronic systolic congestive heart failure  Instructions for follow-up, activity and diet:	Weigh yourself daily.  If you gain 3lbs in 3 days, or 5lbs in a week call your Health Care Provider.  Do not eat or drink foods containing more than 2000mg of salt (sodium) in your diet every day.  Call your Health Care Provider if you have any swelling or increased swelling in your feet, ankles, and/or stomach.  Take all of your medication as directed.  If you become dizzy call your Health Care Provider.

## 2017-04-06 NOTE — DISCHARGE NOTE ADULT - PATIENT PORTAL LINK FT
“You can access the FollowHealth Patient Portal, offered by [facility], by registering with the following website: [email or web address]”

## 2017-04-06 NOTE — DISCHARGE NOTE ADULT - SECONDARY DIAGNOSIS.
Essential hypertension Type 2 diabetes mellitus without complication, with long-term current use of insulin Chronic systolic congestive heart failure

## 2017-04-06 NOTE — DISCHARGE NOTE ADULT - ADDITIONAL INSTRUCTIONS
Follow up with PMD in 1 week  Follow up with Pulmonologist in 1 week Follow up with PMD in 1 week (Pt to make appointment)  Follow up with Pulmonologist in 1 week Follow up with PMD in 1 week (Pt to make appointment)  Follow up with Pulmonologist in 1 week for outpatient pulmonary function test

## 2017-04-06 NOTE — DISCHARGE NOTE ADULT - MEDICATION SUMMARY - MEDICATIONS TO TAKE
I will START or STAY ON the medications listed below when I get home from the hospital:    Aspirin Enteric Coated 81 mg oral delayed release tablet  -- 1 tab(s) by mouth once a day  -- Indication: For Cad    enalapril 10 mg oral tablet  -- 1 tab(s) by mouth 2 times a day  -- Indication: For htn    Lantus 100 units/mL subcutaneous solution  -- 8 unit(s) subcutaneous once a day  -- Indication: For diabetes    cetirizine 10 mg oral tablet  -- 1 tab(s) by mouth once a day  -- Indication: For Asthma    atorvastatin 20 mg oral tablet  -- 1 tab(s) by mouth once a day  -- Indication: For hld    albuterol-ipratropium 2.5 mg-0.5 mg/3 mL inhalation solution  -- 3 milliliter(s) inhaled every 6 hours  -- Indication: For Asthma    Pepcid AC 10 mg oral tablet  -- 1 tab(s) by mouth once a day  -- Indication: For gerd    TheraTears ophthalmic solution  -- 1 drop(s) in each eye once a day  -- Indication: For Eye drops

## 2017-04-06 NOTE — DISCHARGE NOTE ADULT - CARE PROVIDER_API CALL
Estuardo Caruso), Critical Care Medicine; Internal Medicine; Pulmonary Disease; Sleep Medicine  [address] [place] [address]  Phone: [phone]  Fax: [phone]

## 2017-04-07 VITALS
DIASTOLIC BLOOD PRESSURE: 77 MMHG | HEART RATE: 102 BPM | OXYGEN SATURATION: 97 % | SYSTOLIC BLOOD PRESSURE: 152 MMHG | TEMPERATURE: 98 F | RESPIRATION RATE: 20 BRPM

## 2017-04-07 LAB
HCT VFR BLD CALC: 37.2 % — SIGNIFICANT CHANGE UP (ref 34.5–45)
HGB BLD-MCNC: 12.1 G/DL — SIGNIFICANT CHANGE UP (ref 11.5–15.5)
MCHC RBC-ENTMCNC: 30.6 PG — SIGNIFICANT CHANGE UP (ref 27–34)
MCHC RBC-ENTMCNC: 32.5 GM/DL — SIGNIFICANT CHANGE UP (ref 32–36)
MCV RBC AUTO: 93.9 FL — SIGNIFICANT CHANGE UP (ref 80–100)
PLATELET # BLD AUTO: 187 K/UL — SIGNIFICANT CHANGE UP (ref 150–400)
RBC # BLD: 3.96 M/UL — SIGNIFICANT CHANGE UP (ref 3.8–5.2)
RBC # FLD: 15.1 % — HIGH (ref 10.3–14.5)
WBC # BLD: 9.16 K/UL — SIGNIFICANT CHANGE UP (ref 3.8–10.5)
WBC # FLD AUTO: 9.16 K/UL — SIGNIFICANT CHANGE UP (ref 3.8–10.5)

## 2017-04-07 PROCEDURE — 85610 PROTHROMBIN TIME: CPT

## 2017-04-07 PROCEDURE — 87633 RESP VIRUS 12-25 TARGETS: CPT

## 2017-04-07 PROCEDURE — 83605 ASSAY OF LACTIC ACID: CPT

## 2017-04-07 PROCEDURE — 84132 ASSAY OF SERUM POTASSIUM: CPT

## 2017-04-07 PROCEDURE — 96374 THER/PROPH/DIAG INJ IV PUSH: CPT

## 2017-04-07 PROCEDURE — 84295 ASSAY OF SERUM SODIUM: CPT

## 2017-04-07 PROCEDURE — 85730 THROMBOPLASTIN TIME PARTIAL: CPT

## 2017-04-07 PROCEDURE — 80053 COMPREHEN METABOLIC PANEL: CPT

## 2017-04-07 PROCEDURE — 82947 ASSAY GLUCOSE BLOOD QUANT: CPT

## 2017-04-07 PROCEDURE — 82435 ASSAY OF BLOOD CHLORIDE: CPT

## 2017-04-07 PROCEDURE — 87486 CHLMYD PNEUM DNA AMP PROBE: CPT

## 2017-04-07 PROCEDURE — 80048 BASIC METABOLIC PNL TOTAL CA: CPT

## 2017-04-07 PROCEDURE — 83735 ASSAY OF MAGNESIUM: CPT

## 2017-04-07 PROCEDURE — 84100 ASSAY OF PHOSPHORUS: CPT

## 2017-04-07 PROCEDURE — 83036 HEMOGLOBIN GLYCOSYLATED A1C: CPT

## 2017-04-07 PROCEDURE — 99285 EMERGENCY DEPT VISIT HI MDM: CPT | Mod: 25

## 2017-04-07 PROCEDURE — 85014 HEMATOCRIT: CPT

## 2017-04-07 PROCEDURE — 94640 AIRWAY INHALATION TREATMENT: CPT

## 2017-04-07 PROCEDURE — 82803 BLOOD GASES ANY COMBINATION: CPT

## 2017-04-07 PROCEDURE — 87798 DETECT AGENT NOS DNA AMP: CPT

## 2017-04-07 PROCEDURE — 93005 ELECTROCARDIOGRAM TRACING: CPT

## 2017-04-07 PROCEDURE — 71045 X-RAY EXAM CHEST 1 VIEW: CPT

## 2017-04-07 PROCEDURE — 85027 COMPLETE CBC AUTOMATED: CPT

## 2017-04-07 PROCEDURE — 82330 ASSAY OF CALCIUM: CPT

## 2017-04-07 PROCEDURE — 83880 ASSAY OF NATRIURETIC PEPTIDE: CPT

## 2017-04-07 PROCEDURE — 87581 M.PNEUMON DNA AMP PROBE: CPT

## 2017-04-07 PROCEDURE — 96375 TX/PRO/DX INJ NEW DRUG ADDON: CPT

## 2017-04-07 RX ORDER — MOMETASONE FUROATE AND FORMOTEROL FUMARATE DIHYDRATE 200; 5 UG/1; UG/1
2 AEROSOL RESPIRATORY (INHALATION)
Qty: 120 | Refills: 0 | OUTPATIENT
Start: 2017-04-07 | End: 2017-05-07

## 2017-04-07 RX ORDER — ALBUTEROL 90 UG/1
2 AEROSOL, METERED ORAL
Qty: 0 | Refills: 0 | COMMUNITY

## 2017-04-07 RX ORDER — INSULIN LISPRO 100/ML
VIAL (ML) SUBCUTANEOUS AT BEDTIME
Qty: 0 | Refills: 0 | Status: DISCONTINUED | OUTPATIENT
Start: 2017-04-07 | End: 2017-04-07

## 2017-04-07 RX ORDER — ALBUTEROL 90 UG/1
2 AEROSOL, METERED ORAL
Qty: 1 | Refills: 0 | OUTPATIENT
Start: 2017-04-07 | End: 2017-05-07

## 2017-04-07 RX ORDER — MOMETASONE FUROATE AND FORMOTEROL FUMARATE DIHYDRATE 200; 5 UG/1; UG/1
2 AEROSOL RESPIRATORY (INHALATION)
Qty: 0 | Refills: 0 | COMMUNITY

## 2017-04-07 RX ADMIN — Medication 3 MILLILITER(S): at 05:03

## 2017-04-07 RX ADMIN — HEPARIN SODIUM 5000 UNIT(S): 5000 INJECTION INTRAVENOUS; SUBCUTANEOUS at 05:03

## 2017-04-07 RX ADMIN — Medication 30 MILLIGRAM(S): at 10:28

## 2017-04-07 RX ADMIN — Medication 10 MILLIGRAM(S): at 05:03

## 2017-04-07 RX ADMIN — Medication 2: at 12:37

## 2017-04-07 RX ADMIN — Medication 81 MILLIGRAM(S): at 12:02

## 2017-04-07 RX ADMIN — LORATADINE 10 MILLIGRAM(S): 10 TABLET ORAL at 12:03

## 2017-04-07 RX ADMIN — Medication 3 MILLILITER(S): at 12:02

## 2017-04-07 RX ADMIN — Medication 100 MILLIGRAM(S): at 12:49

## 2017-04-07 RX ADMIN — FLUTICASONE PROPIONATE AND SALMETEROL 1 DOSE(S): 50; 250 POWDER ORAL; RESPIRATORY (INHALATION) at 05:03

## 2017-04-07 RX ADMIN — Medication 100 MILLIGRAM(S): at 01:21

## 2017-04-07 RX ADMIN — FAMOTIDINE 20 MILLIGRAM(S): 10 INJECTION INTRAVENOUS at 12:02

## 2017-04-07 RX ADMIN — Medication 3: at 08:07

## 2017-04-07 RX ADMIN — Medication 3 MILLILITER(S): at 01:10

## 2017-04-07 NOTE — DIETITIAN INITIAL EVALUATION ADULT. - ENERGY NEEDS
Ht: 5'2 Wt:180pounds UBW:160pounds %UBW:112% IBW: 110pounds %IBW:163% BMI:33.1kg/m2  Other pertinent information: Pt presents with edema 2+ b/l ankles, skin intact

## 2017-04-07 NOTE — DIETITIAN INITIAL EVALUATION ADULT. - ADHERENCE
Pt follows a DORA diet, but does not adhere to consistent CHO diet guidelines. States she will buy low sodium items over regular versions, and uses Mrs. Dash instead of salt when cooking. Diet recall reveals pt eats 3 meals per day, snacks at night. Breakfast: HBE or scrambled eggs with hot chocolate or tea. Lunch: fried chicken salad and low sodium soup. Dinner: starch and protein, usually limited vegetables. Reports snacking too much at night, usually on low salt potato chips, cocktail shrimp, "other snacks" or desserts including ice cream, cake and pies . No mirconutrient or commercial beverage supplementation. Of note pt states she has a Lantus pen and humalog at home, however will not take insulin unless her BG is over 130mg/dL, even though MD advises her to use insulin daily. Reports fasting BG in am ~116-120, reports daily weight checks. NKFA

## 2017-04-07 NOTE — DIETITIAN INITIAL EVALUATION ADULT. - PHYSICAL APPEARANCE
Pt reports 10/2016 her UBW was 160pounds, states she has been over eating which has lead her to gain 20pounds [id number]. Reports current weight to be 180pounds, which is consistent with admission weight./obese

## 2017-04-07 NOTE — DIETITIAN INITIAL EVALUATION ADULT. - NS AS NUTRI INTERV ED CONTENT
Consistent CHO/DASH diet education provided. Discussed with pt foods and beverages that contain CHO. Discussed the importance of balancing meals, and food pairing. Discussed with pt the importance of fiber and its effect on BG. Encouraged pt to comply to doctors orders regarding insulin. Reviewed how to read food labels. Reviewed foods that are high in sodium and the relationship between sodium and fluid retention. Discussed ways to limit sodium in the diet, the importance of daily weights, and CHF weight gain parameters. Written material provided./Survival information/Nutrition relationship to health/disease

## 2017-04-07 NOTE — DIETITIAN INITIAL EVALUATION ADULT. - NS AS NUTRI INTERV MEALS SNACK
Recommend continue current diet order (consistent CHO/DASH). Encourage PO intake at meals, will provide preferences within therapeutic diet guidelines as requested.

## 2017-04-07 NOTE — DIETITIAN INITIAL EVALUATION ADULT. - OTHER INFO
Pt admitted for weezing, SOB and dyspnea x1week, worsening x2 days PTA. Reports she decided to go to the ED when steroids did not provide relief. Reports good PO intake at meals, ~%. No c/o GI distress, last BM today. No chewing/swallowing issues. Pt states she has never been exposed to consistent CHO diet guidelines, amenable to diet education. Pt seen as consult for OisT2w4.0%, and heart failure, admitted for weezing, SOB and dyspnea x1week, worsening x2 days PTA. Reports she decided to go to the ED when steroids did not provide relief. Reports good PO intake at meals, ~%. No c/o GI distress, last BM today. No chewing/swallowing issues. Pt states she has never been exposed to consistent CHO diet guidelines, amenable to diet education.

## 2017-04-24 ENCOUNTER — APPOINTMENT (OUTPATIENT)
Dept: CV DIAGNOSITCS | Facility: HOSPITAL | Age: 72
End: 2017-04-24

## 2017-04-24 ENCOUNTER — OUTPATIENT (OUTPATIENT)
Dept: OUTPATIENT SERVICES | Facility: HOSPITAL | Age: 72
LOS: 1 days | End: 2017-04-24
Payer: MEDICARE

## 2017-04-24 ENCOUNTER — APPOINTMENT (OUTPATIENT)
Dept: CARDIOLOGY | Facility: CLINIC | Age: 72
End: 2017-04-24

## 2017-04-24 VITALS
RESPIRATION RATE: 16 BRPM | OXYGEN SATURATION: 100 % | BODY MASS INDEX: 33.29 KG/M2 | HEART RATE: 85 BPM | SYSTOLIC BLOOD PRESSURE: 130 MMHG | WEIGHT: 182 LBS | DIASTOLIC BLOOD PRESSURE: 79 MMHG

## 2017-04-24 DIAGNOSIS — Z86.79 PERSONAL HISTORY OF OTHER DISEASES OF THE CIRCULATORY SYSTEM: ICD-10-CM

## 2017-04-24 PROCEDURE — 93306 TTE W/DOPPLER COMPLETE: CPT

## 2017-04-24 PROCEDURE — 93306 TTE W/DOPPLER COMPLETE: CPT | Mod: 26

## 2017-05-11 ENCOUNTER — INPATIENT (INPATIENT)
Facility: HOSPITAL | Age: 72
LOS: 3 days | Discharge: HOME | DRG: 190 | End: 2017-05-15
Attending: LEGAL MEDICINE | Admitting: LEGAL MEDICINE
Payer: MEDICARE

## 2017-05-11 VITALS
RESPIRATION RATE: 20 BRPM | DIASTOLIC BLOOD PRESSURE: 84 MMHG | HEART RATE: 95 BPM | SYSTOLIC BLOOD PRESSURE: 170 MMHG | OXYGEN SATURATION: 99 % | TEMPERATURE: 98 F

## 2017-05-11 DIAGNOSIS — J30.2 OTHER SEASONAL ALLERGIC RHINITIS: ICD-10-CM

## 2017-05-11 DIAGNOSIS — I50.40 UNSPECIFIED COMBINED SYSTOLIC (CONGESTIVE) AND DIASTOLIC (CONGESTIVE) HEART FAILURE: ICD-10-CM

## 2017-05-11 DIAGNOSIS — E11.9 TYPE 2 DIABETES MELLITUS WITHOUT COMPLICATIONS: ICD-10-CM

## 2017-05-11 DIAGNOSIS — J45.901 UNSPECIFIED ASTHMA WITH (ACUTE) EXACERBATION: ICD-10-CM

## 2017-05-11 DIAGNOSIS — E78.5 HYPERLIPIDEMIA, UNSPECIFIED: ICD-10-CM

## 2017-05-11 LAB
ALBUMIN SERPL ELPH-MCNC: 3.8 G/DL — SIGNIFICANT CHANGE UP (ref 3.3–5)
ALP SERPL-CCNC: 91 U/L — SIGNIFICANT CHANGE UP (ref 40–120)
ALT FLD-CCNC: 18 U/L RC — SIGNIFICANT CHANGE UP (ref 10–45)
ANION GAP SERPL CALC-SCNC: 14 MMOL/L — SIGNIFICANT CHANGE UP (ref 5–17)
APTT BLD: 27.9 SEC — SIGNIFICANT CHANGE UP (ref 27.5–37.4)
AST SERPL-CCNC: 47 U/L — HIGH (ref 10–40)
BASOPHILS # BLD AUTO: 0 K/UL — SIGNIFICANT CHANGE UP (ref 0–0.2)
BASOPHILS NFR BLD AUTO: 0.6 % — SIGNIFICANT CHANGE UP (ref 0–2)
BILIRUB SERPL-MCNC: 0.3 MG/DL — SIGNIFICANT CHANGE UP (ref 0.2–1.2)
BUN SERPL-MCNC: 20 MG/DL — SIGNIFICANT CHANGE UP (ref 7–23)
CALCIUM SERPL-MCNC: 9.5 MG/DL — SIGNIFICANT CHANGE UP (ref 8.4–10.5)
CHLORIDE SERPL-SCNC: 103 MMOL/L — SIGNIFICANT CHANGE UP (ref 96–108)
CK MB BLD-MCNC: 1.8 % — SIGNIFICANT CHANGE UP (ref 0–3.5)
CK MB CFR SERPL CALC: 3.3 NG/ML — SIGNIFICANT CHANGE UP (ref 0–3.8)
CK SERPL-CCNC: 180 U/L — HIGH (ref 25–170)
CO2 SERPL-SCNC: 22 MMOL/L — SIGNIFICANT CHANGE UP (ref 22–31)
CREAT SERPL-MCNC: 0.89 MG/DL — SIGNIFICANT CHANGE UP (ref 0.5–1.3)
EOSINOPHIL # BLD AUTO: 0.1 K/UL — SIGNIFICANT CHANGE UP (ref 0–0.5)
EOSINOPHIL NFR BLD AUTO: 1.1 % — SIGNIFICANT CHANGE UP (ref 0–6)
GAS PNL BLDV: SIGNIFICANT CHANGE UP
GLUCOSE SERPL-MCNC: 222 MG/DL — HIGH (ref 70–99)
HCT VFR BLD CALC: 35.5 % — SIGNIFICANT CHANGE UP (ref 34.5–45)
HGB BLD-MCNC: 12.4 G/DL — SIGNIFICANT CHANGE UP (ref 11.5–15.5)
INR BLD: 1.02 RATIO — SIGNIFICANT CHANGE UP (ref 0.88–1.16)
LYMPHOCYTES # BLD AUTO: 2.1 K/UL — SIGNIFICANT CHANGE UP (ref 1–3.3)
LYMPHOCYTES # BLD AUTO: 25.7 % — SIGNIFICANT CHANGE UP (ref 13–44)
MCHC RBC-ENTMCNC: 33.6 PG — SIGNIFICANT CHANGE UP (ref 27–34)
MCHC RBC-ENTMCNC: 34.8 GM/DL — SIGNIFICANT CHANGE UP (ref 32–36)
MCV RBC AUTO: 96.5 FL — SIGNIFICANT CHANGE UP (ref 80–100)
MONOCYTES # BLD AUTO: 0.6 K/UL — SIGNIFICANT CHANGE UP (ref 0–0.9)
MONOCYTES NFR BLD AUTO: 7.3 % — SIGNIFICANT CHANGE UP (ref 2–14)
NEUTROPHILS # BLD AUTO: 5.3 K/UL — SIGNIFICANT CHANGE UP (ref 1.8–7.4)
NEUTROPHILS NFR BLD AUTO: 65.3 % — SIGNIFICANT CHANGE UP (ref 43–77)
NT-PROBNP SERPL-SCNC: 146 PG/ML — SIGNIFICANT CHANGE UP (ref 0–300)
PLATELET # BLD AUTO: 147 K/UL — LOW (ref 150–400)
POTASSIUM SERPL-MCNC: 6.3 MMOL/L — CRITICAL HIGH (ref 3.5–5.3)
POTASSIUM SERPL-SCNC: 6.3 MMOL/L — CRITICAL HIGH (ref 3.5–5.3)
PROT SERPL-MCNC: 7.1 G/DL — SIGNIFICANT CHANGE UP (ref 6–8.3)
PROTHROM AB SERPL-ACNC: 11 SEC — SIGNIFICANT CHANGE UP (ref 9.8–12.7)
RAPID RVP RESULT: SIGNIFICANT CHANGE UP
RBC # BLD: 3.68 M/UL — LOW (ref 3.8–5.2)
RBC # FLD: 12.8 % — SIGNIFICANT CHANGE UP (ref 10.3–14.5)
SODIUM SERPL-SCNC: 139 MMOL/L — SIGNIFICANT CHANGE UP (ref 135–145)
TROPONIN T SERPL-MCNC: <0.01 NG/ML — SIGNIFICANT CHANGE UP (ref 0–0.06)
WBC # BLD: 8.2 K/UL — SIGNIFICANT CHANGE UP (ref 3.8–10.5)
WBC # FLD AUTO: 8.2 K/UL — SIGNIFICANT CHANGE UP (ref 3.8–10.5)

## 2017-05-11 PROCEDURE — 93971 EXTREMITY STUDY: CPT | Mod: 26

## 2017-05-11 PROCEDURE — 99291 CRITICAL CARE FIRST HOUR: CPT

## 2017-05-11 PROCEDURE — 71020: CPT | Mod: 26

## 2017-05-11 PROCEDURE — 93010 ELECTROCARDIOGRAM REPORT: CPT

## 2017-05-11 PROCEDURE — 99223 1ST HOSP IP/OBS HIGH 75: CPT | Mod: AI

## 2017-05-11 PROCEDURE — 93308 TTE F-UP OR LMTD: CPT | Mod: 26

## 2017-05-11 RX ORDER — MAGNESIUM SULFATE 500 MG/ML
2 VIAL (ML) INJECTION ONCE
Qty: 0 | Refills: 0 | Status: COMPLETED | OUTPATIENT
Start: 2017-05-11 | End: 2017-05-11

## 2017-05-11 RX ORDER — IPRATROPIUM/ALBUTEROL SULFATE 18-103MCG
3 AEROSOL WITH ADAPTER (GRAM) INHALATION EVERY 4 HOURS
Qty: 0 | Refills: 0 | Status: DISCONTINUED | OUTPATIENT
Start: 2017-05-11 | End: 2017-05-14

## 2017-05-11 RX ORDER — HEPARIN SODIUM 5000 [USP'U]/ML
5000 INJECTION INTRAVENOUS; SUBCUTANEOUS EVERY 12 HOURS
Qty: 0 | Refills: 0 | Status: DISCONTINUED | OUTPATIENT
Start: 2017-05-11 | End: 2017-05-15

## 2017-05-11 RX ORDER — INSULIN LISPRO 100/ML
4 VIAL (ML) SUBCUTANEOUS ONCE
Qty: 0 | Refills: 0 | Status: COMPLETED | OUTPATIENT
Start: 2017-05-11 | End: 2017-05-11

## 2017-05-11 RX ORDER — INSULIN GLARGINE 100 [IU]/ML
8 INJECTION, SOLUTION SUBCUTANEOUS AT BEDTIME
Qty: 0 | Refills: 0 | Status: DISCONTINUED | OUTPATIENT
Start: 2017-05-11 | End: 2017-05-12

## 2017-05-11 RX ORDER — LORATADINE 10 MG/1
10 TABLET ORAL DAILY
Qty: 0 | Refills: 0 | Status: DISCONTINUED | OUTPATIENT
Start: 2017-05-11 | End: 2017-05-15

## 2017-05-11 RX ORDER — ALBUTEROL 90 UG/1
2.5 AEROSOL, METERED ORAL ONCE
Qty: 0 | Refills: 0 | Status: COMPLETED | OUTPATIENT
Start: 2017-05-11 | End: 2017-05-11

## 2017-05-11 RX ORDER — MOMETASONE FUROATE AND FORMOTEROL FUMARATE DIHYDRATE 200; 5 UG/1; UG/1
1 AEROSOL RESPIRATORY (INHALATION)
Qty: 0 | Refills: 0 | COMMUNITY

## 2017-05-11 RX ORDER — GLUCAGON INJECTION, SOLUTION 0.5 MG/.1ML
1 INJECTION, SOLUTION SUBCUTANEOUS ONCE
Qty: 0 | Refills: 0 | Status: DISCONTINUED | OUTPATIENT
Start: 2017-05-11 | End: 2017-05-15

## 2017-05-11 RX ORDER — ALBUTEROL 90 UG/1
1 AEROSOL, METERED ORAL EVERY 4 HOURS
Qty: 0 | Refills: 0 | Status: DISCONTINUED | OUTPATIENT
Start: 2017-05-11 | End: 2017-05-15

## 2017-05-11 RX ORDER — DEXTROSE 50 % IN WATER 50 %
12.5 SYRINGE (ML) INTRAVENOUS ONCE
Qty: 0 | Refills: 0 | Status: DISCONTINUED | OUTPATIENT
Start: 2017-05-11 | End: 2017-05-15

## 2017-05-11 RX ORDER — TIOTROPIUM BROMIDE 18 UG/1
1 CAPSULE ORAL; RESPIRATORY (INHALATION) DAILY
Qty: 0 | Refills: 0 | Status: DISCONTINUED | OUTPATIENT
Start: 2017-05-11 | End: 2017-05-15

## 2017-05-11 RX ORDER — IPRATROPIUM/ALBUTEROL SULFATE 18-103MCG
3 AEROSOL WITH ADAPTER (GRAM) INHALATION ONCE
Qty: 0 | Refills: 0 | Status: COMPLETED | OUTPATIENT
Start: 2017-05-11 | End: 2017-05-11

## 2017-05-11 RX ORDER — SODIUM CHLORIDE 9 MG/ML
1000 INJECTION, SOLUTION INTRAVENOUS
Qty: 0 | Refills: 0 | Status: DISCONTINUED | OUTPATIENT
Start: 2017-05-11 | End: 2017-05-15

## 2017-05-11 RX ORDER — FUROSEMIDE 40 MG
20 TABLET ORAL DAILY
Qty: 0 | Refills: 0 | Status: DISCONTINUED | OUTPATIENT
Start: 2017-05-11 | End: 2017-05-14

## 2017-05-11 RX ORDER — FAMOTIDINE 10 MG/ML
20 INJECTION INTRAVENOUS DAILY
Qty: 0 | Refills: 0 | Status: DISCONTINUED | OUTPATIENT
Start: 2017-05-11 | End: 2017-05-15

## 2017-05-11 RX ORDER — ATORVASTATIN CALCIUM 80 MG/1
20 TABLET, FILM COATED ORAL AT BEDTIME
Qty: 0 | Refills: 0 | Status: DISCONTINUED | OUTPATIENT
Start: 2017-05-11 | End: 2017-05-15

## 2017-05-11 RX ORDER — INSULIN LISPRO 100/ML
VIAL (ML) SUBCUTANEOUS
Qty: 0 | Refills: 0 | Status: DISCONTINUED | OUTPATIENT
Start: 2017-05-11 | End: 2017-05-12

## 2017-05-11 RX ORDER — DEXTROSE 50 % IN WATER 50 %
25 SYRINGE (ML) INTRAVENOUS ONCE
Qty: 0 | Refills: 0 | Status: DISCONTINUED | OUTPATIENT
Start: 2017-05-11 | End: 2017-05-15

## 2017-05-11 RX ORDER — ASPIRIN/CALCIUM CARB/MAGNESIUM 324 MG
81 TABLET ORAL DAILY
Qty: 0 | Refills: 0 | Status: DISCONTINUED | OUTPATIENT
Start: 2017-05-11 | End: 2017-05-15

## 2017-05-11 RX ORDER — IPRATROPIUM/ALBUTEROL SULFATE 18-103MCG
3 AEROSOL WITH ADAPTER (GRAM) INHALATION ONCE
Qty: 0 | Refills: 0 | Status: DISCONTINUED | OUTPATIENT
Start: 2017-05-11 | End: 2017-05-11

## 2017-05-11 RX ORDER — DEXTROSE 50 % IN WATER 50 %
1 SYRINGE (ML) INTRAVENOUS ONCE
Qty: 0 | Refills: 0 | Status: DISCONTINUED | OUTPATIENT
Start: 2017-05-11 | End: 2017-05-15

## 2017-05-11 RX ADMIN — Medication 10 MILLIGRAM(S): at 21:54

## 2017-05-11 RX ADMIN — Medication 1 DROP(S): at 21:53

## 2017-05-11 RX ADMIN — Medication 3 MILLILITER(S): at 21:53

## 2017-05-11 RX ADMIN — Medication 4 UNIT(S): at 19:46

## 2017-05-11 RX ADMIN — HEPARIN SODIUM 5000 UNIT(S): 5000 INJECTION INTRAVENOUS; SUBCUTANEOUS at 21:54

## 2017-05-11 RX ADMIN — Medication 50 GRAM(S): at 17:59

## 2017-05-11 RX ADMIN — Medication 50 MILLIGRAM(S): at 16:22

## 2017-05-11 RX ADMIN — ATORVASTATIN CALCIUM 20 MILLIGRAM(S): 80 TABLET, FILM COATED ORAL at 21:54

## 2017-05-11 RX ADMIN — INSULIN GLARGINE 8 UNIT(S): 100 INJECTION, SOLUTION SUBCUTANEOUS at 21:54

## 2017-05-11 NOTE — ED PROVIDER NOTE - CARDIAC, MLM
Normal rate, regular rhythm.  Heart sounds S1, S2.  No murmurs, rubs or gallops.  B/L LE edema.  symmetric (although family states L side is worse).  TTP over L pretibia, no gastrocnemius ttp b/l

## 2017-05-11 NOTE — ED ADULT NURSE NOTE - ED STAT RN HANDOFF DETAILS
Report rcv'd from SKIP Watson Report rcv'd from SKIP Watson  Report given to SKIP Beard Pt is stable at the time of report.

## 2017-05-11 NOTE — H&P ADULT. - PROBLEM SELECTOR PLAN 3
ck baseline ekg (ordered by me)  cont enalapril  not on beta blocker likely 2nd asthma  start lasix 20mg daily given LE edema (takes prn at home)

## 2017-05-11 NOTE — H&P ADULT. - PSH
AICD (Automatic Cardioverter/Defibrillator) Present  inserted in Aug, 2008. Due for battery change in 1 month. ( Trellise) . Inserted by Dr Duffy  S/P Cholecystectomy    S/P cholecystectomy

## 2017-05-11 NOTE — ED PROCEDURE NOTE - PROCEDURE ADDITIONAL DETAILS
POCUS: Emergency Department Focused Ultrasound performed at patient's bedside.  The complete report will be available in PACS.

## 2017-05-11 NOTE — ED PROVIDER NOTE - ATTENDING CONTRIBUTION TO CARE
ATTENDING MD:  ILoyd, personally have seen and examined this patient.  I have discussed all aspects of care with the resident physician. Resident note reviewed and agree on plan of care and except where noted.  See HPI, PE, and MDM for details.     Mildly ill appearing, moderate repsiratory distress, speaking in 5-10 word sentences, mild accessory muscle use. Diffuse wheezing. heart rate mildly tachycardic, regular, no murmurs. 1-2cm JVP elevation, b/l symmteric LE edema, nontender. abd soft. mentating appropriately and moving all extremities. Abd soft, notnender, no CVAT    MDM: probable asthma exacerbtaion, will r/o DVT, proable admission.

## 2017-05-11 NOTE — ED ADULT NURSE NOTE - PSH
AICD (Automatic Cardioverter/Defibrillator) Present  inserted in Aug, 2008. Due for battery change in 1 month. ( [employer]) . Inserted by Dr Duffy  S/P Cholecystectomy    S/P cholecystectomy

## 2017-05-11 NOTE — ED PROVIDER NOTE - PSH
AICD (Automatic Cardioverter/Defibrillator) Present  inserted in Aug, 2008. Due for battery change in 1 month. ( [employer]) . Inserted by Dr Duffy  S/P Cholecystectomy    S/P cholecystectomy

## 2017-05-11 NOTE — H&P ADULT. - PROBLEM SELECTOR PLAN 1
Pt declined duonebs in ED 2nd cardiac arrhythmia concerns.  pt given information on xopenex per request and declines its use.  Agrees to try duonebs overnight given ongoing wheezing and high risk for severe asthma (h/o intubation).  Will start duoneb q4hr for now and space out nebs as wheezing improves.  cont prednisone 50mg daily  pt on dulera at home which has a therapeutic exchange here and takes prn at home.  consider restarting dual steroid/long acting beta agonist while in house  consider pulmonary consult given increasing frequencies of exacerbations (likely viral plus allergic trigger given seasonal allergies)  monitor respiratory status closely with q4hr VS  subcutaneous heparin for DVT prophylaxis

## 2017-05-11 NOTE — ED PROVIDER NOTE - MEDICAL DECISION MAKING DETAILS
72F hx asthma with multiple admission and trach, CHF, HTN, HLD presents with sob, mild respiratory distress and wheezing.  VSS.  Asthma v CHF.  Addtionally eval for LLE DVT with POCUS.  duonebs, steroids, +/- mag, reassess

## 2017-05-11 NOTE — ED PROVIDER NOTE - MUSCULOSKELETAL, MLM
Spine appears normal, range of motion is not limited, no muscle or joint tenderness Spine appears normal, range of motion is not limited, no muscle or joint tenderness see above

## 2017-05-11 NOTE — ED PROVIDER NOTE - CONSTITUTIONAL, MLM
normal... Well appearing, well nourished, awake, alert, oriented to person, place, time/situation and in mild respiratory distress.

## 2017-05-11 NOTE — ED ADULT NURSE NOTE - OBJECTIVE STATEMENT
73 y/o female presents to the ED via EMS. Daughter at the bedside. C/O intermittent SOB and L leg edema x few weeks. Pt. reports having a doctors appointment today for L leg edema. PCP advised pt. to come into ED. 73 y/o female presents to the ED via EMS. Daughter at the bedside. C/O intermittent SOB and L leg edema x few weeks. Pt. reports having a doctors appointment today for L leg edema. PCP advised pt. to come into ED. Pt. reports unrelieved SOB with nebulizer treatment with EMS. Pt. reports being intubated 1 year ago for respiratory distress. +productive cough, white color x few weeks. Pt. reports traveling to [place] 2 weeks ago. Pt. denies fever, chills, chest pain, N/V/D, numbness and tingling. +bilateral pitting edema and wheezing present bilaterally. Pt. is resting, receiving neb treatment, and awaiting MD evaluation. Afebrile at this time.

## 2017-05-11 NOTE — ED PROVIDER NOTE - OBJECTIVE STATEMENT
72F hx asthma/copd multiple admission including ICU last year with prior tach, CHF, DM, HTN presents from PMD (carmine weston) for worsening sob/wheezing.  Mild cough.  Additionally experiencing worsening b/l LE edema, left greater than right.  Mild blunt trauma to LLE approx 2 weeks ago.  Admitted for asthma last month.  Denies fever/chills, chest pain. 72F hx asthma/copd multiple admission including ICU last year with prior tach, CHF, DM, HTN presents from PMD (carmine weston) for worsening sob/wheezing.  Mild cough.  Additionally experiencing worsening b/l LE edema, left greater than right.  Mild blunt trauma to LLE approx 2 weeks ago.  Admitted for asthma last month.  Denies fever/chills, chest pain. Not improved with home breathing treatments. Similar to prior

## 2017-05-11 NOTE — ED PROVIDER NOTE - CRITICAL CARE PROVIDED
additional history taking/documentation/frequent reeval with nebs, breathing/direct patient care (not related to procedure)/consult w/ pt's family directly relating to pts condition/interpretation of diagnostic studies

## 2017-05-11 NOTE — ED ADULT NURSE REASSESSMENT NOTE - NS ED NURSE REASSESS COMMENT FT1
Pt rcv'd from SKIP Watson, pt given bed on 4DSU. Report called to SKIP Tena. Pt not ordered for tele at this time. VSS. Pt , given 4 units humalog. Family at bedside, pt verbalized improvement in breathing. Maintained 96% O2 sat on RA. Will continue to monitor pending transport to floor.

## 2017-05-11 NOTE — H&P ADULT. - HISTORY OF PRESENT ILLNESS
72 f with CHF s/p AICD (most recent echo improved with mild LV dysfx, diastolic dysfx), DM (takes prn lantus, none taken in at least 1 week), htn, gout, asthma (childhood onset, was intubated s/p trach/peg which were removed 10/16), 4/17 admission for asthma exacerbation discharged 4/7 now presents again with sob and padron.  Pt states breathing had been improved at least discharge but developed a cold sometime after which has made sob progressively worse over last few weeks.  This week not responding to home nebulizers and inhalers.  Came to ED.  No f/c.  No sick contacts.  + frontal headache and pressure over eyes and sinuses with sneezing/cough white sputum, + seasonal allergies.  Pt states this feels similar to past asthma exacerbations  Pt also noticed increased LE edema    In /80, hr 88, temp 36.7, sat 98, rr 18  Pt was given prednisone 50mg, magnesium 2g and insulin 4U after prednisone for elevated FS.  Pt declined nebulizers stating cardiologist warned her of tachycardia/palpitations.

## 2017-05-12 ENCOUNTER — TRANSCRIPTION ENCOUNTER (OUTPATIENT)
Age: 72
End: 2017-05-12

## 2017-05-12 PROCEDURE — 99223 1ST HOSP IP/OBS HIGH 75: CPT

## 2017-05-12 RX ORDER — INSULIN LISPRO 100/ML
5 VIAL (ML) SUBCUTANEOUS
Qty: 0 | Refills: 0 | Status: DISCONTINUED | OUTPATIENT
Start: 2017-05-12 | End: 2017-05-15

## 2017-05-12 RX ORDER — MONTELUKAST 4 MG/1
10 TABLET, CHEWABLE ORAL DAILY
Qty: 0 | Refills: 0 | Status: DISCONTINUED | OUTPATIENT
Start: 2017-05-12 | End: 2017-05-15

## 2017-05-12 RX ORDER — INSULIN LISPRO 100/ML
VIAL (ML) SUBCUTANEOUS AT BEDTIME
Qty: 0 | Refills: 0 | Status: DISCONTINUED | OUTPATIENT
Start: 2017-05-12 | End: 2017-05-15

## 2017-05-12 RX ORDER — INSULIN GLARGINE 100 [IU]/ML
8 INJECTION, SOLUTION SUBCUTANEOUS AT BEDTIME
Qty: 0 | Refills: 0 | Status: DISCONTINUED | OUTPATIENT
Start: 2017-05-12 | End: 2017-05-15

## 2017-05-12 RX ORDER — BUDESONIDE AND FORMOTEROL FUMARATE DIHYDRATE 160; 4.5 UG/1; UG/1
2 AEROSOL RESPIRATORY (INHALATION)
Qty: 0 | Refills: 0 | Status: DISCONTINUED | OUTPATIENT
Start: 2017-05-12 | End: 2017-05-15

## 2017-05-12 RX ORDER — INSULIN GLARGINE 100 [IU]/ML
10 INJECTION, SOLUTION SUBCUTANEOUS AT BEDTIME
Qty: 0 | Refills: 0 | Status: DISCONTINUED | OUTPATIENT
Start: 2017-05-12 | End: 2017-05-12

## 2017-05-12 RX ORDER — INSULIN LISPRO 100/ML
VIAL (ML) SUBCUTANEOUS
Qty: 0 | Refills: 0 | Status: DISCONTINUED | OUTPATIENT
Start: 2017-05-12 | End: 2017-05-15

## 2017-05-12 RX ADMIN — Medication 8: at 17:25

## 2017-05-12 RX ADMIN — LORATADINE 10 MILLIGRAM(S): 10 TABLET ORAL at 11:04

## 2017-05-12 RX ADMIN — Medication 3 MILLILITER(S): at 05:50

## 2017-05-12 RX ADMIN — ATORVASTATIN CALCIUM 20 MILLIGRAM(S): 80 TABLET, FILM COATED ORAL at 22:40

## 2017-05-12 RX ADMIN — Medication 3 MILLILITER(S): at 09:56

## 2017-05-12 RX ADMIN — MONTELUKAST 10 MILLIGRAM(S): 4 TABLET, CHEWABLE ORAL at 15:58

## 2017-05-12 RX ADMIN — Medication 100 MILLIGRAM(S): at 22:39

## 2017-05-12 RX ADMIN — Medication 100 MILLIGRAM(S): at 00:08

## 2017-05-12 RX ADMIN — Medication 50 MILLIGRAM(S): at 05:51

## 2017-05-12 RX ADMIN — Medication 20 MILLIGRAM(S): at 05:50

## 2017-05-12 RX ADMIN — Medication 1 DROP(S): at 11:04

## 2017-05-12 RX ADMIN — Medication 5: at 12:38

## 2017-05-12 RX ADMIN — Medication 3 MILLILITER(S): at 02:11

## 2017-05-12 RX ADMIN — BUDESONIDE AND FORMOTEROL FUMARATE DIHYDRATE 2 PUFF(S): 160; 4.5 AEROSOL RESPIRATORY (INHALATION) at 18:37

## 2017-05-12 RX ADMIN — Medication 1: at 08:20

## 2017-05-12 RX ADMIN — Medication 81 MILLIGRAM(S): at 11:04

## 2017-05-12 RX ADMIN — INSULIN GLARGINE 8 UNIT(S): 100 INJECTION, SOLUTION SUBCUTANEOUS at 22:40

## 2017-05-12 RX ADMIN — Medication 10 MILLIGRAM(S): at 18:38

## 2017-05-12 RX ADMIN — Medication 5 UNIT(S): at 17:25

## 2017-05-12 RX ADMIN — Medication 3 MILLILITER(S): at 15:13

## 2017-05-12 RX ADMIN — Medication 10 MILLIGRAM(S): at 05:50

## 2017-05-12 RX ADMIN — Medication 3 MILLILITER(S): at 22:39

## 2017-05-12 RX ADMIN — FAMOTIDINE 20 MILLIGRAM(S): 10 INJECTION INTRAVENOUS at 11:04

## 2017-05-12 RX ADMIN — HEPARIN SODIUM 5000 UNIT(S): 5000 INJECTION INTRAVENOUS; SUBCUTANEOUS at 09:56

## 2017-05-12 RX ADMIN — HEPARIN SODIUM 5000 UNIT(S): 5000 INJECTION INTRAVENOUS; SUBCUTANEOUS at 22:40

## 2017-05-12 RX ADMIN — Medication 100 MILLIGRAM(S): at 05:54

## 2017-05-12 RX ADMIN — Medication 100 MILLIGRAM(S): at 13:45

## 2017-05-12 RX ADMIN — Medication 3 MILLILITER(S): at 18:39

## 2017-05-12 NOTE — DISCHARGE NOTE ADULT - HOSPITAL COURSE
To be completed by attending. 72 f with CHF s/p AICD (most recent echo improved with mild LV dysfx, diastolic dysfx), DM (takes prn lantus, none taken in at least 1 week), htn, gout, asthma (childhood onset, was intubated s/p trach/peg which were removed 10/16), 4/17 admission for asthma exacerbation discharged 4/7 now presents again with sob and padron.  Admit  Asthma  Exac     5/11: Duoneb, prednisone   5/11 echo  Left sided b line predominance.  5/11 (-) dvt LLE  5/12 	House Pulm (Rodriguez): HfdEF, s/p AICS/PPM, asthma , HTN, DM now with asthma 	exac/bronchiectasis exac and acute hypoxic resp fx. Clinically improving . Would 	decrease pred to 40 daily and taper slowly over 10-14 days. c/w duonebs q4h, add symbicort bid, c/w spiriva. Add singulair 10mg hs. Follow peak flow rates. check 	sputum c/s. no radiological evid of pna. can hold abx for now. Add flonase BID for post nasal gtt. Can use tessalon perle for cough. Needs outpt pulm f/u and PFT's upon d/c .

## 2017-05-12 NOTE — DISCHARGE NOTE ADULT - CARE PROVIDERS DIRECT ADDRESSES
[email or web address]

## 2017-05-12 NOTE — DISCHARGE NOTE ADULT - PATIENT PORTAL LINK FT
“You can access the FollowHealth Patient Portal, offered by [facility], by registering with the following website: [email or web address]”

## 2017-05-12 NOTE — DISCHARGE NOTE ADULT - PROVIDER TOKENS
TOKEN:'[id number]' TOKEN:'[id number]' TOKEN:'[id number]:'[id number]',TOKEN:'[id number]'

## 2017-05-12 NOTE — DISCHARGE NOTE ADULT - PLAN OF CARE
Less shortness of breath Continue with prednisone taper, respiratory treatments as prescribed by your doctor.  Follow-up with your primary care physician.  Follow-up with pulmonary in 1 week for pulmonary function test. Make sure you get your HgA1c checked every three months.  If you take oral diabetes medications, check your blood glucose two times a day.  If you take insulin, check your blood glucose before meals and at bedtime.  It's important not to skip any meals.  Keep a log of your blood glucose results and always take it with you to your doctor appointments.  Keep a list of your current medications including injectables and over the counter medications and bring this medication list with you to all your doctor appointments.  If you have not seen your ophthalmologist this year call for appointment.  Check your feet daily for redness, sores, or openings. Do not self treat. If no improvement in two days call your primary care physician for an appointment.  Low blood sugar (hypoglycemia) is a blood sugar below 70mg/dl. Check your blood sugar if you feel signs/symptoms of hypoglycemia. If your blood sugar is below 70 take 15 grams of carbohydrates (ex 4 oz of apple juice, 3-4 glucose tablets, or 4-6 oz of regular soda) wait 15 minutes and repeat blood sugar to make sure it comes up above 70.  If your blood sugar is above 70 and you are due for a meal, have a meal.  If you are not due for a meal have a snack.  This snack helps keeps your blood sugar at a safe range. Low salt diet  Activity as tolerated.  Take all medication as prescribed.  Follow up with your medical doctor for routine blood pressure monitoring at your next visit.  Notify your doctor if you have any of the following symptoms:   Dizziness, Lightheadedness, Blurry vision, Headache, Chest pain, Shortness of breath Continue with your cholesterol medications. Eat a heart healthy diet that is low in saturated fats and salt, and includes whole grains, fruits, vegetables and lean protein; exercise regularly (consult with your physician or cardiologist first); maintain a heart healthy weight; if you smoke - quit (A resource to help you stop smoking is the [facility] – phone number 260-628-5713.). Continue to follow with your primary physician or cardiologist. Weigh yourself daily.  If you gain 3lbs in 3 days, or 5lbs in a week call your Health Care Provider.  Do not eat or drink foods containing more than 2000mg of salt (sodium) in your diet every day.  Call your Health Care Provider if you have any swelling or increased swelling in your feet, ankles, and/or stomach.  Take all of your medication as directed.  If you become dizzy call your Health Care Provider.

## 2017-05-12 NOTE — DISCHARGE NOTE ADULT - MEDICATION SUMMARY - MEDICATIONS TO TAKE
I will START or STAY ON the medications listed below when I get home from the hospital:    predniSONE 10 mg oral tablet  -- 40 mg by mouth x2 days, then 30 mg by mouth x5 days, then 20 mg by mouth x5 days, then 10 mg by mouth x5 days.  -- Indication: For Steroids    aspirin 81 mg oral delayed release tablet  -- 1 tab(s) by mouth once a day  -- Indication: For Coronary Artery Disease    enalapril 10 mg oral tablet  -- 1 tab(s) by mouth 2 times a day  -- Indication: For High Blood Pressure    Lantus 100 units/mL subcutaneous solution  -- 8 unit(s) subcutaneous once a day  -- Indication: For Diabetes    loratadine 10 mg oral tablet  -- 1 tab(s) by mouth once a day  -- Indication: For Allergy    atorvastatin 20 mg oral tablet  -- 1 tab(s) by mouth once a day (at bedtime)  -- Indication: For Cholesterol    budesonide-formoterol 80 mcg-4.5 mcg/inh inhalation aerosol  -- 2 puff(s) inhaled 2 times a day  -- Indication: For Bronchodilator    albuterol-ipratropium 2.5 mg-0.5 mg/3 mL inhalation solution  -- 3 milliliter(s) inhaled every 6 hours  -- Indication: For Bronchodilator    furosemide 40 mg oral tablet  -- 1 tab(s) by mouth once a day  -- Indication: For Water Pill     famotidine 20 mg oral tablet  -- 1 tab(s) by mouth once a day  -- Indication: For Gastrointestinal    montelukast 10 mg oral tablet  -- 1 tab(s) by mouth once a day  -- Indication: For Allergy    TheraTears ophthalmic solution  -- 1 drop(s) in each eye once a day  -- Indication: For Ophthalmic

## 2017-05-12 NOTE — DISCHARGE NOTE ADULT - CARE PROVIDER_API CALL
Kathryn Frias), Critical Care Medicine; Internal Medicine; Pulmonary Disease  [address] [place] [address]  Phone: [phone]  Fax: [phone] Estuardo Caruso), Critical Care Medicine; Internal Medicine; Pulmonary Disease; Sleep Medicine  [address] [place] [address]  Phone: [phone]  Fax: [phone] Kirill Daily), Internal Medicine; Pulmonary Disease  [address] [place] [address]  Phone: [phone]  Fax: [phone]    Balaji Hobson (MD; MPH), Adv Heart Fail Trnsplnt Cardio; Cardiology; Internal Medicine  [address] [place] [address]  Phone: [phone]  Fax: [phone]    Neena Comer), Internal Medicine  [address] [place] [address]  Phone: [phone]  Fax: [phone]

## 2017-05-12 NOTE — DISCHARGE NOTE ADULT - MEDICATION SUMMARY - MEDICATIONS TO STOP TAKING
I will STOP taking the medications listed below when I get home from the hospital:    Dulera 200 mcg-5 mcg/inh inhalation aerosol  -- 1 puff(s) by mouth 2 times a day, As Needed

## 2017-05-12 NOTE — DISCHARGE NOTE ADULT - CARE PLAN
Principal Discharge DX:	Asthma exacerbation  Goal:	Less shortness of breath  Instructions for follow-up, activity and diet:	Continue with prednisone taper, respiratory treatments as prescribed by your doctor.  Follow-up with your primary care physician.  Follow-up with pulmonary in 1 week for pulmonary function test.  Secondary Diagnosis:	Type 2 diabetes mellitus without complication, with long-term current use of insulin  Instructions for follow-up, activity and diet:	Make sure you get your HgA1c checked every three months.  If you take oral diabetes medications, check your blood glucose two times a day.  If you take insulin, check your blood glucose before meals and at bedtime.  It's important not to skip any meals.  Keep a log of your blood glucose results and always take it with you to your doctor appointments.  Keep a list of your current medications including injectables and over the counter medications and bring this medication list with you to all your doctor appointments.  If you have not seen your ophthalmologist this year call for appointment.  Check your feet daily for redness, sores, or openings. Do not self treat. If no improvement in two days call your primary care physician for an appointment.  Low blood sugar (hypoglycemia) is a blood sugar below 70mg/dl. Check your blood sugar if you feel signs/symptoms of hypoglycemia. If your blood sugar is below 70 take 15 grams of carbohydrates (ex 4 oz of apple juice, 3-4 glucose tablets, or 4-6 oz of regular soda) wait 15 minutes and repeat blood sugar to make sure it comes up above 70.  If your blood sugar is above 70 and you are due for a meal, have a meal.  If you are not due for a meal have a snack.  This snack helps keeps your blood sugar at a safe range.  Secondary Diagnosis:	HTN (hypertension)  Instructions for follow-up, activity and diet:	Low salt diet  Activity as tolerated.  Take all medication as prescribed.  Follow up with your medical doctor for routine blood pressure monitoring at your next visit.  Notify your doctor if you have any of the following symptoms:   Dizziness, Lightheadedness, Blurry vision, Headache, Chest pain, Shortness of breath  Secondary Diagnosis:	Hyperlipidemia, unspecified hyperlipidemia type  Instructions for follow-up, activity and diet:	Continue with your cholesterol medications. Eat a heart healthy diet that is low in saturated fats and salt, and includes whole grains, fruits, vegetables and lean protein; exercise regularly (consult with your physician or cardiologist first); maintain a heart healthy weight; if you smoke - quit (A resource to help you stop smoking is the [facility] – phone number 796-812-7389.). Continue to follow with your primary physician or cardiologist.  Secondary Diagnosis:	Combined systolic and diastolic congestive heart failure, unspecified congestive heart failure chronicity  Instructions for follow-up, activity and diet:	Weigh yourself daily.  If you gain 3lbs in 3 days, or 5lbs in a week call your Health Care Provider.  Do not eat or drink foods containing more than 2000mg of salt (sodium) in your diet every day.  Call your Health Care Provider if you have any swelling or increased swelling in your feet, ankles, and/or stomach.  Take all of your medication as directed.  If you become dizzy call your Health Care Provider. Principal Discharge DX:	Asthma exacerbation  Goal:	Less shortness of breath  Instructions for follow-up, activity and diet:	Continue with prednisone taper, respiratory treatments as prescribed by your doctor.  Follow-up with your primary care physician.  Follow-up with pulmonary in 1 week for pulmonary function test.  Secondary Diagnosis:	Type 2 diabetes mellitus without complication, with long-term current use of insulin  Instructions for follow-up, activity and diet:	Make sure you get your HgA1c checked every three months.  If you take oral diabetes medications, check your blood glucose two times a day.  If you take insulin, check your blood glucose before meals and at bedtime.  It's important not to skip any meals.  Keep a log of your blood glucose results and always take it with you to your doctor appointments.  Keep a list of your current medications including injectables and over the counter medications and bring this medication list with you to all your doctor appointments.  If you have not seen your ophthalmologist this year call for appointment.  Check your feet daily for redness, sores, or openings. Do not self treat. If no improvement in two days call your primary care physician for an appointment.  Low blood sugar (hypoglycemia) is a blood sugar below 70mg/dl. Check your blood sugar if you feel signs/symptoms of hypoglycemia. If your blood sugar is below 70 take 15 grams of carbohydrates (ex 4 oz of apple juice, 3-4 glucose tablets, or 4-6 oz of regular soda) wait 15 minutes and repeat blood sugar to make sure it comes up above 70.  If your blood sugar is above 70 and you are due for a meal, have a meal.  If you are not due for a meal have a snack.  This snack helps keeps your blood sugar at a safe range.  Secondary Diagnosis:	HTN (hypertension)  Instructions for follow-up, activity and diet:	Low salt diet  Activity as tolerated.  Take all medication as prescribed.  Follow up with your medical doctor for routine blood pressure monitoring at your next visit.  Notify your doctor if you have any of the following symptoms:   Dizziness, Lightheadedness, Blurry vision, Headache, Chest pain, Shortness of breath  Secondary Diagnosis:	Hyperlipidemia, unspecified hyperlipidemia type  Instructions for follow-up, activity and diet:	Continue with your cholesterol medications. Eat a heart healthy diet that is low in saturated fats and salt, and includes whole grains, fruits, vegetables and lean protein; exercise regularly (consult with your physician or cardiologist first); maintain a heart healthy weight; if you smoke - quit (A resource to help you stop smoking is the [facility] – phone number 232-984-6091.). Continue to follow with your primary physician or cardiologist.  Secondary Diagnosis:	Combined systolic and diastolic congestive heart failure, unspecified congestive heart failure chronicity  Instructions for follow-up, activity and diet:	Weigh yourself daily.  If you gain 3lbs in 3 days, or 5lbs in a week call your Health Care Provider.  Do not eat or drink foods containing more than 2000mg of salt (sodium) in your diet every day.  Call your Health Care Provider if you have any swelling or increased swelling in your feet, ankles, and/or stomach.  Take all of your medication as directed.  If you become dizzy call your Health Care Provider. Principal Discharge DX:	Asthma exacerbation  Goal:	Less shortness of breath  Instructions for follow-up, activity and diet:	Continue with prednisone taper, respiratory treatments as prescribed by your doctor.  Follow-up with your primary care physician.  Follow-up with pulmonary in 1 week for pulmonary function test.  Secondary Diagnosis:	Type 2 diabetes mellitus without complication, with long-term current use of insulin  Instructions for follow-up, activity and diet:	Make sure you get your HgA1c checked every three months.  If you take oral diabetes medications, check your blood glucose two times a day.  If you take insulin, check your blood glucose before meals and at bedtime.  It's important not to skip any meals.  Keep a log of your blood glucose results and always take it with you to your doctor appointments.  Keep a list of your current medications including injectables and over the counter medications and bring this medication list with you to all your doctor appointments.  If you have not seen your ophthalmologist this year call for appointment.  Check your feet daily for redness, sores, or openings. Do not self treat. If no improvement in two days call your primary care physician for an appointment.  Low blood sugar (hypoglycemia) is a blood sugar below 70mg/dl. Check your blood sugar if you feel signs/symptoms of hypoglycemia. If your blood sugar is below 70 take 15 grams of carbohydrates (ex 4 oz of apple juice, 3-4 glucose tablets, or 4-6 oz of regular soda) wait 15 minutes and repeat blood sugar to make sure it comes up above 70.  If your blood sugar is above 70 and you are due for a meal, have a meal.  If you are not due for a meal have a snack.  This snack helps keeps your blood sugar at a safe range.  Secondary Diagnosis:	HTN (hypertension)  Instructions for follow-up, activity and diet:	Low salt diet  Activity as tolerated.  Take all medication as prescribed.  Follow up with your medical doctor for routine blood pressure monitoring at your next visit.  Notify your doctor if you have any of the following symptoms:   Dizziness, Lightheadedness, Blurry vision, Headache, Chest pain, Shortness of breath  Secondary Diagnosis:	Hyperlipidemia, unspecified hyperlipidemia type  Instructions for follow-up, activity and diet:	Continue with your cholesterol medications. Eat a heart healthy diet that is low in saturated fats and salt, and includes whole grains, fruits, vegetables and lean protein; exercise regularly (consult with your physician or cardiologist first); maintain a heart healthy weight; if you smoke - quit (A resource to help you stop smoking is the [facility] – phone number [phone].). Continue to follow with your primary physician or cardiologist.  Secondary Diagnosis:	Combined systolic and diastolic congestive heart failure, unspecified congestive heart failure chronicity  Instructions for follow-up, activity and diet:	Weigh yourself daily.  If you gain 3lbs in 3 days, or 5lbs in a week call your Health Care Provider.  Do not eat or drink foods containing more than 2000mg of salt (sodium) in your diet every day.  Call your Health Care Provider if you have any swelling or increased swelling in your feet, ankles, and/or stomach.  Take all of your medication as directed.  If you become dizzy call your Health Care Provider. Principal Discharge DX:	Asthma exacerbation  Goal:	Less shortness of breath  Instructions for follow-up, activity and diet:	Continue with prednisone taper, respiratory treatments as prescribed by your doctor.  Follow-up with your primary care physician.  Follow-up with pulmonary in 1 week for pulmonary function test.  Secondary Diagnosis:	Type 2 diabetes mellitus without complication, with long-term current use of insulin  Instructions for follow-up, activity and diet:	Make sure you get your HgA1c checked every three months.  If you take oral diabetes medications, check your blood glucose two times a day.  If you take insulin, check your blood glucose before meals and at bedtime.  It's important not to skip any meals.  Keep a log of your blood glucose results and always take it with you to your doctor appointments.  Keep a list of your current medications including injectables and over the counter medications and bring this medication list with you to all your doctor appointments.  If you have not seen your ophthalmologist this year call for appointment.  Check your feet daily for redness, sores, or openings. Do not self treat. If no improvement in two days call your primary care physician for an appointment.  Low blood sugar (hypoglycemia) is a blood sugar below 70mg/dl. Check your blood sugar if you feel signs/symptoms of hypoglycemia. If your blood sugar is below 70 take 15 grams of carbohydrates (ex 4 oz of apple juice, 3-4 glucose tablets, or 4-6 oz of regular soda) wait 15 minutes and repeat blood sugar to make sure it comes up above 70.  If your blood sugar is above 70 and you are due for a meal, have a meal.  If you are not due for a meal have a snack.  This snack helps keeps your blood sugar at a safe range.  Secondary Diagnosis:	HTN (hypertension)  Instructions for follow-up, activity and diet:	Low salt diet  Activity as tolerated.  Take all medication as prescribed.  Follow up with your medical doctor for routine blood pressure monitoring at your next visit.  Notify your doctor if you have any of the following symptoms:   Dizziness, Lightheadedness, Blurry vision, Headache, Chest pain, Shortness of breath  Secondary Diagnosis:	Hyperlipidemia, unspecified hyperlipidemia type  Instructions for follow-up, activity and diet:	Continue with your cholesterol medications. Eat a heart healthy diet that is low in saturated fats and salt, and includes whole grains, fruits, vegetables and lean protein; exercise regularly (consult with your physician or cardiologist first); maintain a heart healthy weight; if you smoke - quit (A resource to help you stop smoking is the [facility] – phone number 662-600-9925.). Continue to follow with your primary physician or cardiologist.  Secondary Diagnosis:	Combined systolic and diastolic congestive heart failure, unspecified congestive heart failure chronicity  Instructions for follow-up, activity and diet:	Weigh yourself daily.  If you gain 3lbs in 3 days, or 5lbs in a week call your Health Care Provider.  Do not eat or drink foods containing more than 2000mg of salt (sodium) in your diet every day.  Call your Health Care Provider if you have any swelling or increased swelling in your feet, ankles, and/or stomach.  Take all of your medication as directed.  If you become dizzy call your Health Care Provider. Principal Discharge DX:	Asthma exacerbation  Goal:	Less shortness of breath  Instructions for follow-up, activity and diet:	Continue with prednisone taper, respiratory treatments as prescribed by your doctor.  Follow-up with your primary care physician.  Follow-up with pulmonary in 1 week for pulmonary function test.  Secondary Diagnosis:	Type 2 diabetes mellitus without complication, with long-term current use of insulin  Instructions for follow-up, activity and diet:	Make sure you get your HgA1c checked every three months.  If you take oral diabetes medications, check your blood glucose two times a day.  If you take insulin, check your blood glucose before meals and at bedtime.  It's important not to skip any meals.  Keep a log of your blood glucose results and always take it with you to your doctor appointments.  Keep a list of your current medications including injectables and over the counter medications and bring this medication list with you to all your doctor appointments.  If you have not seen your ophthalmologist this year call for appointment.  Check your feet daily for redness, sores, or openings. Do not self treat. If no improvement in two days call your primary care physician for an appointment.  Low blood sugar (hypoglycemia) is a blood sugar below 70mg/dl. Check your blood sugar if you feel signs/symptoms of hypoglycemia. If your blood sugar is below 70 take 15 grams of carbohydrates (ex 4 oz of apple juice, 3-4 glucose tablets, or 4-6 oz of regular soda) wait 15 minutes and repeat blood sugar to make sure it comes up above 70.  If your blood sugar is above 70 and you are due for a meal, have a meal.  If you are not due for a meal have a snack.  This snack helps keeps your blood sugar at a safe range.  Secondary Diagnosis:	HTN (hypertension)  Instructions for follow-up, activity and diet:	Low salt diet  Activity as tolerated.  Take all medication as prescribed.  Follow up with your medical doctor for routine blood pressure monitoring at your next visit.  Notify your doctor if you have any of the following symptoms:   Dizziness, Lightheadedness, Blurry vision, Headache, Chest pain, Shortness of breath  Secondary Diagnosis:	Hyperlipidemia, unspecified hyperlipidemia type  Instructions for follow-up, activity and diet:	Continue with your cholesterol medications. Eat a heart healthy diet that is low in saturated fats and salt, and includes whole grains, fruits, vegetables and lean protein; exercise regularly (consult with your physician or cardiologist first); maintain a heart healthy weight; if you smoke - quit (A resource to help you stop smoking is the [facility] – phone number 735-442-9030.). Continue to follow with your primary physician or cardiologist.  Secondary Diagnosis:	Combined systolic and diastolic congestive heart failure, unspecified congestive heart failure chronicity  Instructions for follow-up, activity and diet:	Weigh yourself daily.  If you gain 3lbs in 3 days, or 5lbs in a week call your Health Care Provider.  Do not eat or drink foods containing more than 2000mg of salt (sodium) in your diet every day.  Call your Health Care Provider if you have any swelling or increased swelling in your feet, ankles, and/or stomach.  Take all of your medication as directed.  If you become dizzy call your Health Care Provider.

## 2017-05-12 NOTE — DISCHARGE NOTE ADULT - SECONDARY DIAGNOSIS.
Type 2 diabetes mellitus without complication, with long-term current use of insulin HTN (hypertension) Hyperlipidemia, unspecified hyperlipidemia type Combined systolic and diastolic congestive heart failure, unspecified congestive heart failure chronicity

## 2017-05-13 PROCEDURE — 99233 SBSQ HOSP IP/OBS HIGH 50: CPT | Mod: GC

## 2017-05-13 PROCEDURE — 99232 SBSQ HOSP IP/OBS MODERATE 35: CPT | Mod: GC

## 2017-05-13 RX ADMIN — INSULIN GLARGINE 8 UNIT(S): 100 INJECTION, SOLUTION SUBCUTANEOUS at 21:43

## 2017-05-13 RX ADMIN — Medication 3 MILLILITER(S): at 14:24

## 2017-05-13 RX ADMIN — Medication 100 MILLIGRAM(S): at 05:47

## 2017-05-13 RX ADMIN — Medication 81 MILLIGRAM(S): at 12:58

## 2017-05-13 RX ADMIN — Medication 3 MILLILITER(S): at 10:37

## 2017-05-13 RX ADMIN — Medication 5 UNIT(S): at 08:45

## 2017-05-13 RX ADMIN — BUDESONIDE AND FORMOTEROL FUMARATE DIHYDRATE 2 PUFF(S): 160; 4.5 AEROSOL RESPIRATORY (INHALATION) at 05:40

## 2017-05-13 RX ADMIN — HEPARIN SODIUM 5000 UNIT(S): 5000 INJECTION INTRAVENOUS; SUBCUTANEOUS at 12:25

## 2017-05-13 RX ADMIN — ATORVASTATIN CALCIUM 20 MILLIGRAM(S): 80 TABLET, FILM COATED ORAL at 21:41

## 2017-05-13 RX ADMIN — LORATADINE 10 MILLIGRAM(S): 10 TABLET ORAL at 12:58

## 2017-05-13 RX ADMIN — Medication 8: at 17:27

## 2017-05-13 RX ADMIN — Medication 10 MILLIGRAM(S): at 17:29

## 2017-05-13 RX ADMIN — Medication 40 MILLIGRAM(S): at 05:40

## 2017-05-13 RX ADMIN — Medication 3 MILLILITER(S): at 21:40

## 2017-05-13 RX ADMIN — MONTELUKAST 10 MILLIGRAM(S): 4 TABLET, CHEWABLE ORAL at 12:59

## 2017-05-13 RX ADMIN — Medication 3 MILLILITER(S): at 17:25

## 2017-05-13 RX ADMIN — Medication 5 UNIT(S): at 17:26

## 2017-05-13 RX ADMIN — Medication 10 MILLIGRAM(S): at 05:41

## 2017-05-13 RX ADMIN — Medication 20 MILLIGRAM(S): at 05:41

## 2017-05-13 RX ADMIN — Medication 5 UNIT(S): at 12:55

## 2017-05-13 RX ADMIN — BUDESONIDE AND FORMOTEROL FUMARATE DIHYDRATE 2 PUFF(S): 160; 4.5 AEROSOL RESPIRATORY (INHALATION) at 17:30

## 2017-05-13 RX ADMIN — FAMOTIDINE 20 MILLIGRAM(S): 10 INJECTION INTRAVENOUS at 12:58

## 2017-05-13 RX ADMIN — Medication 3 MILLILITER(S): at 17:29

## 2017-05-13 RX ADMIN — Medication 3 MILLILITER(S): at 02:16

## 2017-05-13 RX ADMIN — Medication 2: at 08:46

## 2017-05-13 RX ADMIN — Medication 1 DROP(S): at 13:00

## 2017-05-13 RX ADMIN — Medication 6: at 12:56

## 2017-05-13 RX ADMIN — Medication 3 MILLILITER(S): at 05:40

## 2017-05-13 RX ADMIN — HEPARIN SODIUM 5000 UNIT(S): 5000 INJECTION INTRAVENOUS; SUBCUTANEOUS at 21:41

## 2017-05-13 NOTE — DIETITIAN INITIAL EVALUATION ADULT. - ORAL INTAKE PTA
good/Pt reports good appetite at home with consumption of 3 meals/day. Pt denies snacking. Diet Recall: Breakfast: raisin brain, milk, Glucerna; Lunch: cold cut sandwich on wheat or white bread; Dinner: take out from [employer], [employer], [employer]; pt reports drinking soda for lunch and dinner.

## 2017-05-13 NOTE — DIETITIAN INITIAL EVALUATION ADULT. - OTHER INFO
Pt seen for A1c >7% consult. Per last RD note (4/7), pt with wt of 180 pounds, current wt of 186.9 pounds- 3.8% wt gain x 1 month. 2+ edema marcia. legs noted. Pt reports good appetite with consumption of 100% of meals during hospitalization. Pt denies chewing/swallowing difficulties, N + V, diarrhea, constipation- last BM 5/12. Skin: incontinent associated dermatitis, no pressure injuries. NKFA per pt.

## 2017-05-13 NOTE — DIETITIAN INITIAL EVALUATION ADULT. - ADHERENCE
Pt consumes a regular diet at home with little dietary restrictions. Pt is aware she needs to follow a low sodium, consistent CHO diet, however, does not do so. Pt able to identify low sodium foods and CHO sources when asked. Pt checks her fasting B-130. Pt report she has Lantus at home but will only take in the morning if her BG is above 130. HbA1c = 8.0%. Pt reports checking daily weights.

## 2017-05-13 NOTE — DIETITIAN INITIAL EVALUATION ADULT. - NS AS NUTRI INTERV ED CONTENT
Provided pt with type 2 diabetes medical nutrition therapy handout. Discussed with pt the importance of a consistent CHO diet, snacks with lantus use, pairing a CHO with protein and fiber foods, checking FS daily, portion sizes, small frequent healthy balanced meals, reducing fast food consumption, less soda consumption, low sodium/low fat food choices, daily weights; pt verbalized understanding of nutrition education; expect fair compliance

## 2017-05-13 NOTE — DIETITIAN INITIAL EVALUATION ADULT. - ENERGY NEEDS
ht = 62 inches, wt = 186.9 pounds (5/13/17), BMI = 34.2, IBW = 110 pounds, 170% IBW  Fluid per MD due to CHF.

## 2017-05-14 LAB
ANION GAP SERPL CALC-SCNC: 14 MMOL/L — SIGNIFICANT CHANGE UP (ref 5–17)
BASOPHILS # BLD AUTO: 0.03 K/UL — SIGNIFICANT CHANGE UP (ref 0–0.2)
BASOPHILS NFR BLD AUTO: 0.3 % — SIGNIFICANT CHANGE UP (ref 0–2)
BUN SERPL-MCNC: 29 MG/DL — HIGH (ref 7–23)
CALCIUM SERPL-MCNC: 9.6 MG/DL — SIGNIFICANT CHANGE UP (ref 8.4–10.5)
CHLORIDE SERPL-SCNC: 103 MMOL/L — SIGNIFICANT CHANGE UP (ref 96–108)
CO2 SERPL-SCNC: 23 MMOL/L — SIGNIFICANT CHANGE UP (ref 22–31)
CREAT SERPL-MCNC: 1.12 MG/DL — SIGNIFICANT CHANGE UP (ref 0.5–1.3)
EOSINOPHIL # BLD AUTO: 0.03 K/UL — SIGNIFICANT CHANGE UP (ref 0–0.5)
EOSINOPHIL NFR BLD AUTO: 0.3 % — SIGNIFICANT CHANGE UP (ref 0–6)
GLUCOSE SERPL-MCNC: 157 MG/DL — HIGH (ref 70–99)
HCT VFR BLD CALC: 34.9 % — SIGNIFICANT CHANGE UP (ref 34.5–45)
HGB BLD-MCNC: 11.4 G/DL — LOW (ref 11.5–15.5)
IMM GRANULOCYTES NFR BLD AUTO: 0.5 % — SIGNIFICANT CHANGE UP (ref 0–1.5)
LYMPHOCYTES # BLD AUTO: 2.74 K/UL — SIGNIFICANT CHANGE UP (ref 1–3.3)
LYMPHOCYTES # BLD AUTO: 24.7 % — SIGNIFICANT CHANGE UP (ref 13–44)
MCHC RBC-ENTMCNC: 30.9 PG — SIGNIFICANT CHANGE UP (ref 27–34)
MCHC RBC-ENTMCNC: 32.7 GM/DL — SIGNIFICANT CHANGE UP (ref 32–36)
MCV RBC AUTO: 94.6 FL — SIGNIFICANT CHANGE UP (ref 80–100)
MONOCYTES # BLD AUTO: 1.1 K/UL — HIGH (ref 0–0.9)
MONOCYTES NFR BLD AUTO: 9.9 % — SIGNIFICANT CHANGE UP (ref 2–14)
NEUTROPHILS # BLD AUTO: 7.13 K/UL — SIGNIFICANT CHANGE UP (ref 1.8–7.4)
NEUTROPHILS NFR BLD AUTO: 64.3 % — SIGNIFICANT CHANGE UP (ref 43–77)
PLATELET # BLD AUTO: 190 K/UL — SIGNIFICANT CHANGE UP (ref 150–400)
POTASSIUM SERPL-MCNC: 4.2 MMOL/L — SIGNIFICANT CHANGE UP (ref 3.5–5.3)
POTASSIUM SERPL-SCNC: 4.2 MMOL/L — SIGNIFICANT CHANGE UP (ref 3.5–5.3)
RBC # BLD: 3.69 M/UL — LOW (ref 3.8–5.2)
RBC # FLD: 14.5 % — SIGNIFICANT CHANGE UP (ref 10.3–14.5)
SODIUM SERPL-SCNC: 140 MMOL/L — SIGNIFICANT CHANGE UP (ref 135–145)
WBC # BLD: 11.08 K/UL — HIGH (ref 3.8–10.5)
WBC # FLD AUTO: 11.08 K/UL — HIGH (ref 3.8–10.5)

## 2017-05-14 PROCEDURE — 99232 SBSQ HOSP IP/OBS MODERATE 35: CPT | Mod: GC

## 2017-05-14 RX ORDER — ACETAMINOPHEN 500 MG
650 TABLET ORAL ONCE
Qty: 0 | Refills: 0 | Status: COMPLETED | OUTPATIENT
Start: 2017-05-14 | End: 2017-05-14

## 2017-05-14 RX ORDER — DOCUSATE SODIUM 100 MG
100 CAPSULE ORAL THREE TIMES A DAY
Qty: 0 | Refills: 0 | Status: DISCONTINUED | OUTPATIENT
Start: 2017-05-14 | End: 2017-05-15

## 2017-05-14 RX ORDER — IPRATROPIUM/ALBUTEROL SULFATE 18-103MCG
3 AEROSOL WITH ADAPTER (GRAM) INHALATION EVERY 6 HOURS
Qty: 0 | Refills: 0 | Status: DISCONTINUED | OUTPATIENT
Start: 2017-05-14 | End: 2017-05-15

## 2017-05-14 RX ORDER — FUROSEMIDE 40 MG
20 TABLET ORAL ONCE
Qty: 0 | Refills: 0 | Status: COMPLETED | OUTPATIENT
Start: 2017-05-14 | End: 2017-05-14

## 2017-05-14 RX ORDER — SENNA PLUS 8.6 MG/1
2 TABLET ORAL AT BEDTIME
Qty: 0 | Refills: 0 | Status: DISCONTINUED | OUTPATIENT
Start: 2017-05-14 | End: 2017-05-15

## 2017-05-14 RX ORDER — FUROSEMIDE 40 MG
40 TABLET ORAL DAILY
Qty: 0 | Refills: 0 | Status: DISCONTINUED | OUTPATIENT
Start: 2017-05-15 | End: 2017-05-15

## 2017-05-14 RX ADMIN — BUDESONIDE AND FORMOTEROL FUMARATE DIHYDRATE 2 PUFF(S): 160; 4.5 AEROSOL RESPIRATORY (INHALATION) at 17:14

## 2017-05-14 RX ADMIN — Medication 10 MILLIGRAM(S): at 18:36

## 2017-05-14 RX ADMIN — Medication 1 DROP(S): at 12:24

## 2017-05-14 RX ADMIN — Medication 20 MILLIGRAM(S): at 05:53

## 2017-05-14 RX ADMIN — ATORVASTATIN CALCIUM 20 MILLIGRAM(S): 80 TABLET, FILM COATED ORAL at 21:25

## 2017-05-14 RX ADMIN — Medication 10 MILLIGRAM(S): at 05:53

## 2017-05-14 RX ADMIN — Medication 6: at 17:13

## 2017-05-14 RX ADMIN — Medication 3 MILLILITER(S): at 05:52

## 2017-05-14 RX ADMIN — Medication 3 MILLILITER(S): at 02:12

## 2017-05-14 RX ADMIN — Medication 6: at 12:56

## 2017-05-14 RX ADMIN — INSULIN GLARGINE 8 UNIT(S): 100 INJECTION, SOLUTION SUBCUTANEOUS at 22:03

## 2017-05-14 RX ADMIN — Medication 650 MILLIGRAM(S): at 23:00

## 2017-05-14 RX ADMIN — HEPARIN SODIUM 5000 UNIT(S): 5000 INJECTION INTRAVENOUS; SUBCUTANEOUS at 21:25

## 2017-05-14 RX ADMIN — Medication 5 UNIT(S): at 12:57

## 2017-05-14 RX ADMIN — MONTELUKAST 10 MILLIGRAM(S): 4 TABLET, CHEWABLE ORAL at 12:24

## 2017-05-14 RX ADMIN — SENNA PLUS 2 TABLET(S): 8.6 TABLET ORAL at 21:26

## 2017-05-14 RX ADMIN — Medication 5 UNIT(S): at 08:21

## 2017-05-14 RX ADMIN — FAMOTIDINE 20 MILLIGRAM(S): 10 INJECTION INTRAVENOUS at 12:24

## 2017-05-14 RX ADMIN — Medication 650 MILLIGRAM(S): at 22:02

## 2017-05-14 RX ADMIN — Medication 5 UNIT(S): at 17:13

## 2017-05-14 RX ADMIN — LORATADINE 10 MILLIGRAM(S): 10 TABLET ORAL at 12:24

## 2017-05-14 RX ADMIN — Medication 100 MILLIGRAM(S): at 09:29

## 2017-05-14 RX ADMIN — Medication 40 MILLIGRAM(S): at 05:53

## 2017-05-14 RX ADMIN — Medication 81 MILLIGRAM(S): at 12:24

## 2017-05-14 RX ADMIN — Medication 20 MILLIGRAM(S): at 16:20

## 2017-05-14 RX ADMIN — BUDESONIDE AND FORMOTEROL FUMARATE DIHYDRATE 2 PUFF(S): 160; 4.5 AEROSOL RESPIRATORY (INHALATION) at 05:52

## 2017-05-15 VITALS
RESPIRATION RATE: 18 BRPM | DIASTOLIC BLOOD PRESSURE: 77 MMHG | HEART RATE: 92 BPM | SYSTOLIC BLOOD PRESSURE: 121 MMHG | OXYGEN SATURATION: 95 % | TEMPERATURE: 98 F

## 2017-05-15 LAB
ANION GAP SERPL CALC-SCNC: 18 MMOL/L — HIGH (ref 5–17)
BUN SERPL-MCNC: 30 MG/DL — HIGH (ref 7–23)
CALCIUM SERPL-MCNC: 9.7 MG/DL — SIGNIFICANT CHANGE UP (ref 8.4–10.5)
CHLORIDE SERPL-SCNC: 97 MMOL/L — SIGNIFICANT CHANGE UP (ref 96–108)
CO2 SERPL-SCNC: 20 MMOL/L — LOW (ref 22–31)
CREAT SERPL-MCNC: 1.07 MG/DL — SIGNIFICANT CHANGE UP (ref 0.5–1.3)
GLUCOSE SERPL-MCNC: 106 MG/DL — HIGH (ref 70–99)
POTASSIUM SERPL-MCNC: 5.4 MMOL/L — HIGH (ref 3.5–5.3)
POTASSIUM SERPL-SCNC: 5.4 MMOL/L — HIGH (ref 3.5–5.3)
SODIUM SERPL-SCNC: 135 MMOL/L — SIGNIFICANT CHANGE UP (ref 135–145)

## 2017-05-15 PROCEDURE — 80048 BASIC METABOLIC PNL TOTAL CA: CPT

## 2017-05-15 PROCEDURE — 85014 HEMATOCRIT: CPT

## 2017-05-15 PROCEDURE — 82947 ASSAY GLUCOSE BLOOD QUANT: CPT

## 2017-05-15 PROCEDURE — 83880 ASSAY OF NATRIURETIC PEPTIDE: CPT

## 2017-05-15 PROCEDURE — 82803 BLOOD GASES ANY COMBINATION: CPT

## 2017-05-15 PROCEDURE — 83605 ASSAY OF LACTIC ACID: CPT

## 2017-05-15 PROCEDURE — 82565 ASSAY OF CREATININE: CPT

## 2017-05-15 PROCEDURE — 99233 SBSQ HOSP IP/OBS HIGH 50: CPT | Mod: GC

## 2017-05-15 PROCEDURE — 87581 M.PNEUMON DNA AMP PROBE: CPT

## 2017-05-15 PROCEDURE — 84100 ASSAY OF PHOSPHORUS: CPT

## 2017-05-15 PROCEDURE — 82550 ASSAY OF CK (CPK): CPT

## 2017-05-15 PROCEDURE — 93971 EXTREMITY STUDY: CPT

## 2017-05-15 PROCEDURE — 93005 ELECTROCARDIOGRAM TRACING: CPT

## 2017-05-15 PROCEDURE — 87633 RESP VIRUS 12-25 TARGETS: CPT

## 2017-05-15 PROCEDURE — 93308 TTE F-UP OR LMTD: CPT

## 2017-05-15 PROCEDURE — 99232 SBSQ HOSP IP/OBS MODERATE 35: CPT

## 2017-05-15 PROCEDURE — 84484 ASSAY OF TROPONIN QUANT: CPT

## 2017-05-15 PROCEDURE — 87798 DETECT AGENT NOS DNA AMP: CPT

## 2017-05-15 PROCEDURE — 94640 AIRWAY INHALATION TREATMENT: CPT

## 2017-05-15 PROCEDURE — 87486 CHLMYD PNEUM DNA AMP PROBE: CPT

## 2017-05-15 PROCEDURE — 84295 ASSAY OF SERUM SODIUM: CPT

## 2017-05-15 PROCEDURE — 85027 COMPLETE CBC AUTOMATED: CPT

## 2017-05-15 PROCEDURE — 96374 THER/PROPH/DIAG INJ IV PUSH: CPT

## 2017-05-15 PROCEDURE — 83735 ASSAY OF MAGNESIUM: CPT

## 2017-05-15 PROCEDURE — 82553 CREATINE MB FRACTION: CPT

## 2017-05-15 PROCEDURE — 85610 PROTHROMBIN TIME: CPT

## 2017-05-15 PROCEDURE — 71046 X-RAY EXAM CHEST 2 VIEWS: CPT

## 2017-05-15 PROCEDURE — 82330 ASSAY OF CALCIUM: CPT

## 2017-05-15 PROCEDURE — 80053 COMPREHEN METABOLIC PANEL: CPT

## 2017-05-15 PROCEDURE — 85730 THROMBOPLASTIN TIME PARTIAL: CPT

## 2017-05-15 PROCEDURE — 82435 ASSAY OF BLOOD CHLORIDE: CPT

## 2017-05-15 PROCEDURE — 99291 CRITICAL CARE FIRST HOUR: CPT | Mod: 25

## 2017-05-15 PROCEDURE — 84132 ASSAY OF SERUM POTASSIUM: CPT

## 2017-05-15 RX ORDER — CETIRIZINE HYDROCHLORIDE 10 MG/1
1 TABLET ORAL
Qty: 0 | Refills: 0 | COMMUNITY

## 2017-05-15 RX ORDER — MOMETASONE FUROATE AND FORMOTEROL FUMARATE DIHYDRATE 200; 5 UG/1; UG/1
1 AEROSOL RESPIRATORY (INHALATION)
Qty: 0 | Refills: 0 | COMMUNITY

## 2017-05-15 RX ORDER — BUDESONIDE AND FORMOTEROL FUMARATE DIHYDRATE 160; 4.5 UG/1; UG/1
2 AEROSOL RESPIRATORY (INHALATION)
Qty: 1 | Refills: 0 | OUTPATIENT
Start: 2017-05-15 | End: 2017-06-05

## 2017-05-15 RX ORDER — FAMOTIDINE 10 MG/ML
1 INJECTION INTRAVENOUS
Qty: 0 | Refills: 0 | COMMUNITY

## 2017-05-15 RX ORDER — FUROSEMIDE 40 MG
1 TABLET ORAL
Qty: 0 | Refills: 0 | COMMUNITY
Start: 2017-05-15

## 2017-05-15 RX ORDER — ALBUTEROL 90 UG/1
2 AEROSOL, METERED ORAL
Qty: 0 | Refills: 0 | COMMUNITY

## 2017-05-15 RX ORDER — MONTELUKAST 4 MG/1
1 TABLET, CHEWABLE ORAL
Qty: 0 | Refills: 0 | COMMUNITY
Start: 2017-05-15

## 2017-05-15 RX ORDER — ATORVASTATIN CALCIUM 80 MG/1
1 TABLET, FILM COATED ORAL
Qty: 0 | Refills: 0 | COMMUNITY

## 2017-05-15 RX ORDER — LORATADINE 10 MG/1
1 TABLET ORAL
Qty: 0 | Refills: 0 | COMMUNITY
Start: 2017-05-15

## 2017-05-15 RX ORDER — ASPIRIN/CALCIUM CARB/MAGNESIUM 324 MG
1 TABLET ORAL
Qty: 0 | Refills: 0 | COMMUNITY

## 2017-05-15 RX ORDER — FAMOTIDINE 10 MG/ML
1 INJECTION INTRAVENOUS
Qty: 0 | Refills: 0 | COMMUNITY
Start: 2017-05-15

## 2017-05-15 RX ORDER — BUDESONIDE AND FORMOTEROL FUMARATE DIHYDRATE 160; 4.5 UG/1; UG/1
2 AEROSOL RESPIRATORY (INHALATION)
Qty: 0 | Refills: 0 | COMMUNITY
Start: 2017-05-15

## 2017-05-15 RX ADMIN — Medication 5 UNIT(S): at 17:36

## 2017-05-15 RX ADMIN — Medication 10 MILLIGRAM(S): at 06:01

## 2017-05-15 RX ADMIN — Medication 3 MILLILITER(S): at 12:06

## 2017-05-15 RX ADMIN — Medication 1 DROP(S): at 12:06

## 2017-05-15 RX ADMIN — Medication 81 MILLIGRAM(S): at 12:06

## 2017-05-15 RX ADMIN — Medication 40 MILLIGRAM(S): at 06:00

## 2017-05-15 RX ADMIN — Medication 5 UNIT(S): at 12:05

## 2017-05-15 RX ADMIN — BUDESONIDE AND FORMOTEROL FUMARATE DIHYDRATE 2 PUFF(S): 160; 4.5 AEROSOL RESPIRATORY (INHALATION) at 06:01

## 2017-05-15 RX ADMIN — HEPARIN SODIUM 5000 UNIT(S): 5000 INJECTION INTRAVENOUS; SUBCUTANEOUS at 12:07

## 2017-05-15 RX ADMIN — LORATADINE 10 MILLIGRAM(S): 10 TABLET ORAL at 12:06

## 2017-05-15 RX ADMIN — Medication 5 UNIT(S): at 08:27

## 2017-05-15 RX ADMIN — Medication 2: at 17:35

## 2017-05-15 RX ADMIN — Medication 6: at 12:05

## 2017-05-15 RX ADMIN — Medication 100 MILLIGRAM(S): at 12:06

## 2017-05-15 RX ADMIN — FAMOTIDINE 20 MILLIGRAM(S): 10 INJECTION INTRAVENOUS at 12:06

## 2017-05-15 RX ADMIN — MONTELUKAST 10 MILLIGRAM(S): 4 TABLET, CHEWABLE ORAL at 12:06

## 2017-05-15 RX ADMIN — Medication 40 MILLIGRAM(S): at 06:03

## 2017-05-18 ENCOUNTER — APPOINTMENT (OUTPATIENT)
Dept: ELECTROPHYSIOLOGY | Facility: HOSPITAL | Age: 72
End: 2017-05-18

## 2017-06-05 ENCOUNTER — APPOINTMENT (OUTPATIENT)
Dept: CARDIOLOGY | Facility: CLINIC | Age: 72
End: 2017-06-05

## 2017-06-05 VITALS
HEIGHT: 62 IN | BODY MASS INDEX: 34.04 KG/M2 | DIASTOLIC BLOOD PRESSURE: 64 MMHG | HEART RATE: 98 BPM | RESPIRATION RATE: 15 BRPM | OXYGEN SATURATION: 94 % | WEIGHT: 185 LBS | SYSTOLIC BLOOD PRESSURE: 119 MMHG

## 2017-06-05 DIAGNOSIS — I50.22 CHRONIC SYSTOLIC (CONGESTIVE) HEART FAILURE: ICD-10-CM

## 2017-06-06 LAB
ANION GAP SERPL CALC-SCNC: 17 MMOL/L
BUN SERPL-MCNC: 18 MG/DL
CALCIUM SERPL-MCNC: 8.8 MG/DL
CHLORIDE SERPL-SCNC: 101 MMOL/L
CO2 SERPL-SCNC: 22 MMOL/L
CREAT SERPL-MCNC: 1.08 MG/DL
GLUCOSE SERPL-MCNC: 255 MG/DL
POTASSIUM SERPL-SCNC: 4.4 MMOL/L
SODIUM SERPL-SCNC: 140 MMOL/L

## 2017-06-20 ENCOUNTER — APPOINTMENT (OUTPATIENT)
Dept: CARDIOLOGY | Facility: CLINIC | Age: 72
End: 2017-06-20

## 2017-06-20 VITALS
BODY MASS INDEX: 34.04 KG/M2 | DIASTOLIC BLOOD PRESSURE: 73 MMHG | HEART RATE: 89 BPM | HEIGHT: 62 IN | WEIGHT: 185 LBS | SYSTOLIC BLOOD PRESSURE: 121 MMHG | RESPIRATION RATE: 16 BRPM | OXYGEN SATURATION: 95 %

## 2017-09-12 ENCOUNTER — INPATIENT (INPATIENT)
Facility: HOSPITAL | Age: 72
LOS: 8 days | Discharge: HOME | DRG: 191 | End: 2017-09-21
Attending: HOSPITALIST | Admitting: INTERNAL MEDICINE
Payer: MEDICARE

## 2017-09-12 VITALS
RESPIRATION RATE: 26 BRPM | DIASTOLIC BLOOD PRESSURE: 101 MMHG | TEMPERATURE: 98 F | SYSTOLIC BLOOD PRESSURE: 153 MMHG | HEART RATE: 97 BPM | OXYGEN SATURATION: 98 %

## 2017-09-12 DIAGNOSIS — J45.901 UNSPECIFIED ASTHMA WITH (ACUTE) EXACERBATION: ICD-10-CM

## 2017-09-12 PROCEDURE — 71020: CPT | Mod: 26

## 2017-09-12 PROCEDURE — 99285 EMERGENCY DEPT VISIT HI MDM: CPT

## 2017-09-13 DIAGNOSIS — I50.42 CHRONIC COMBINED SYSTOLIC (CONGESTIVE) AND DIASTOLIC (CONGESTIVE) HEART FAILURE: ICD-10-CM

## 2017-09-13 DIAGNOSIS — Z29.9 ENCOUNTER FOR PROPHYLACTIC MEASURES, UNSPECIFIED: ICD-10-CM

## 2017-09-13 DIAGNOSIS — N18.3 CHRONIC KIDNEY DISEASE, STAGE 3 (MODERATE): ICD-10-CM

## 2017-09-13 DIAGNOSIS — E11.9 TYPE 2 DIABETES MELLITUS WITHOUT COMPLICATIONS: ICD-10-CM

## 2017-09-13 DIAGNOSIS — E78.5 HYPERLIPIDEMIA, UNSPECIFIED: ICD-10-CM

## 2017-09-13 DIAGNOSIS — J45.901 UNSPECIFIED ASTHMA WITH (ACUTE) EXACERBATION: ICD-10-CM

## 2017-09-13 DIAGNOSIS — I10 ESSENTIAL (PRIMARY) HYPERTENSION: ICD-10-CM

## 2017-09-13 LAB — HBA1C BLD-MCNC: 7.9 % — HIGH (ref 4–5.6)

## 2017-09-13 PROCEDURE — 99223 1ST HOSP IP/OBS HIGH 75: CPT | Mod: GC

## 2017-09-13 RX ORDER — BUDESONIDE AND FORMOTEROL FUMARATE DIHYDRATE 160; 4.5 UG/1; UG/1
2 AEROSOL RESPIRATORY (INHALATION)
Qty: 0 | Refills: 0 | Status: DISCONTINUED | OUTPATIENT
Start: 2017-09-13 | End: 2017-09-18

## 2017-09-13 RX ORDER — INSULIN LISPRO 100/ML
VIAL (ML) SUBCUTANEOUS AT BEDTIME
Qty: 0 | Refills: 0 | Status: DISCONTINUED | OUTPATIENT
Start: 2017-09-13 | End: 2017-09-21

## 2017-09-13 RX ORDER — FAMOTIDINE 10 MG/ML
10 INJECTION INTRAVENOUS DAILY
Qty: 0 | Refills: 0 | Status: DISCONTINUED | OUTPATIENT
Start: 2017-09-13 | End: 2017-09-21

## 2017-09-13 RX ORDER — DEXTROSE 50 % IN WATER 50 %
25 SYRINGE (ML) INTRAVENOUS ONCE
Qty: 0 | Refills: 0 | Status: DISCONTINUED | OUTPATIENT
Start: 2017-09-13 | End: 2017-09-21

## 2017-09-13 RX ORDER — ASPIRIN/CALCIUM CARB/MAGNESIUM 324 MG
81 TABLET ORAL DAILY
Qty: 0 | Refills: 0 | Status: DISCONTINUED | OUTPATIENT
Start: 2017-09-13 | End: 2017-09-21

## 2017-09-13 RX ORDER — ATORVASTATIN CALCIUM 80 MG/1
20 TABLET, FILM COATED ORAL AT BEDTIME
Qty: 0 | Refills: 0 | Status: DISCONTINUED | OUTPATIENT
Start: 2017-09-13 | End: 2017-09-21

## 2017-09-13 RX ORDER — SODIUM CHLORIDE 9 MG/ML
1000 INJECTION, SOLUTION INTRAVENOUS
Qty: 0 | Refills: 0 | Status: DISCONTINUED | OUTPATIENT
Start: 2017-09-13 | End: 2017-09-21

## 2017-09-13 RX ORDER — GLUCAGON INJECTION, SOLUTION 0.5 MG/.1ML
1 INJECTION, SOLUTION SUBCUTANEOUS ONCE
Qty: 0 | Refills: 0 | Status: DISCONTINUED | OUTPATIENT
Start: 2017-09-13 | End: 2017-09-21

## 2017-09-13 RX ORDER — ENOXAPARIN SODIUM 100 MG/ML
40 INJECTION SUBCUTANEOUS EVERY 24 HOURS
Qty: 0 | Refills: 0 | Status: DISCONTINUED | OUTPATIENT
Start: 2017-09-13 | End: 2017-09-21

## 2017-09-13 RX ORDER — MONTELUKAST 4 MG/1
10 TABLET, CHEWABLE ORAL DAILY
Qty: 0 | Refills: 0 | Status: DISCONTINUED | OUTPATIENT
Start: 2017-09-13 | End: 2017-09-21

## 2017-09-13 RX ORDER — IPRATROPIUM/ALBUTEROL SULFATE 18-103MCG
3 AEROSOL WITH ADAPTER (GRAM) INHALATION EVERY 6 HOURS
Qty: 0 | Refills: 0 | Status: DISCONTINUED | OUTPATIENT
Start: 2017-09-13 | End: 2017-09-18

## 2017-09-13 RX ORDER — INSULIN LISPRO 100/ML
VIAL (ML) SUBCUTANEOUS
Qty: 0 | Refills: 0 | Status: DISCONTINUED | OUTPATIENT
Start: 2017-09-13 | End: 2017-09-21

## 2017-09-13 RX ORDER — DEXTROSE 50 % IN WATER 50 %
12.5 SYRINGE (ML) INTRAVENOUS ONCE
Qty: 0 | Refills: 0 | Status: DISCONTINUED | OUTPATIENT
Start: 2017-09-13 | End: 2017-09-21

## 2017-09-13 RX ORDER — AZITHROMYCIN 500 MG/1
500 TABLET, FILM COATED ORAL ONCE
Qty: 0 | Refills: 0 | Status: COMPLETED | OUTPATIENT
Start: 2017-09-13 | End: 2017-09-13

## 2017-09-13 RX ORDER — INSULIN GLARGINE 100 [IU]/ML
8 INJECTION, SOLUTION SUBCUTANEOUS AT BEDTIME
Qty: 0 | Refills: 0 | Status: DISCONTINUED | OUTPATIENT
Start: 2017-09-13 | End: 2017-09-21

## 2017-09-13 RX ORDER — ASPIRIN/CALCIUM CARB/MAGNESIUM 324 MG
81 TABLET ORAL DAILY
Qty: 0 | Refills: 0 | Status: DISCONTINUED | OUTPATIENT
Start: 2017-09-13 | End: 2017-09-13

## 2017-09-13 RX ORDER — LORATADINE 10 MG/1
10 TABLET ORAL DAILY
Qty: 0 | Refills: 0 | Status: DISCONTINUED | OUTPATIENT
Start: 2017-09-13 | End: 2017-09-21

## 2017-09-13 RX ORDER — DEXTROSE 50 % IN WATER 50 %
1 SYRINGE (ML) INTRAVENOUS ONCE
Qty: 0 | Refills: 0 | Status: DISCONTINUED | OUTPATIENT
Start: 2017-09-13 | End: 2017-09-21

## 2017-09-13 RX ORDER — IPRATROPIUM/ALBUTEROL SULFATE 18-103MCG
3 AEROSOL WITH ADAPTER (GRAM) INHALATION ONCE
Qty: 0 | Refills: 0 | Status: COMPLETED | OUTPATIENT
Start: 2017-09-13 | End: 2017-09-13

## 2017-09-13 RX ADMIN — Medication 2: at 09:16

## 2017-09-13 RX ADMIN — Medication 10 MILLIGRAM(S): at 05:39

## 2017-09-13 RX ADMIN — Medication 50 MILLIGRAM(S): at 05:39

## 2017-09-13 RX ADMIN — Medication 10 MILLIGRAM(S): at 18:03

## 2017-09-13 RX ADMIN — Medication 3 MILLILITER(S): at 06:47

## 2017-09-13 RX ADMIN — Medication 81 MILLIGRAM(S): at 12:28

## 2017-09-13 RX ADMIN — Medication 100 MILLIGRAM(S): at 22:31

## 2017-09-13 RX ADMIN — BUDESONIDE AND FORMOTEROL FUMARATE DIHYDRATE 2 PUFF(S): 160; 4.5 AEROSOL RESPIRATORY (INHALATION) at 06:47

## 2017-09-13 RX ADMIN — INSULIN GLARGINE 8 UNIT(S): 100 INJECTION, SOLUTION SUBCUTANEOUS at 22:31

## 2017-09-13 RX ADMIN — ATORVASTATIN CALCIUM 20 MILLIGRAM(S): 80 TABLET, FILM COATED ORAL at 22:31

## 2017-09-13 RX ADMIN — Medication 3 MILLILITER(S): at 23:29

## 2017-09-13 RX ADMIN — INSULIN GLARGINE 8 UNIT(S): 100 INJECTION, SOLUTION SUBCUTANEOUS at 00:37

## 2017-09-13 RX ADMIN — LORATADINE 10 MILLIGRAM(S): 10 TABLET ORAL at 12:28

## 2017-09-13 RX ADMIN — AZITHROMYCIN 500 MILLIGRAM(S): 500 TABLET, FILM COATED ORAL at 05:39

## 2017-09-13 RX ADMIN — Medication 2: at 18:11

## 2017-09-13 RX ADMIN — BUDESONIDE AND FORMOTEROL FUMARATE DIHYDRATE 2 PUFF(S): 160; 4.5 AEROSOL RESPIRATORY (INHALATION) at 18:14

## 2017-09-13 RX ADMIN — Medication 3: at 12:30

## 2017-09-13 RX ADMIN — Medication 3 MILLILITER(S): at 04:59

## 2017-09-13 RX ADMIN — MONTELUKAST 10 MILLIGRAM(S): 4 TABLET, CHEWABLE ORAL at 12:28

## 2017-09-13 RX ADMIN — Medication 3 MILLILITER(S): at 12:39

## 2017-09-13 RX ADMIN — Medication 3 MILLILITER(S): at 18:14

## 2017-09-13 RX ADMIN — ENOXAPARIN SODIUM 40 MILLIGRAM(S): 100 INJECTION SUBCUTANEOUS at 05:39

## 2017-09-13 RX ADMIN — FAMOTIDINE 10 MILLIGRAM(S): 10 INJECTION INTRAVENOUS at 12:29

## 2017-09-13 NOTE — H&P ADULT - PSH
AICD (Automatic Cardioverter/Defibrillator) Present  inserted in Aug, 2008. Due for battery change in 1 month. ( [employer]) . Inserted by Dr Duffy  S/P cholecystectomy    S/P Cholecystectomy

## 2017-09-13 NOTE — H&P ADULT - NSHPLABSRESULTS_GEN_ALL_CORE
personally reviewed labs and imaging                            12.9   9.8   )-----------( 111      ( 12 Sep 2017 15:18 )             38.7       -12    143  |  103  |  15  ----------------------------<  107<H>  4.9   |  24  |  0.97    Ca    9.2      12 Sep 2017 15:18    TPro  6.9  /  Alb  3.7  /  TBili  0.6  /  DBili  x   /  AST  21  /  ALT  19  /  AlkPhos  98  09-12              Urinalysis Basic - ( 12 Sep 2017 15:18 )    Color: x / Appearance: Clear / S.011 / pH: x  Gluc: x / Ketone: Negative  / Bili: Negative / Urobili: Negative   Blood: x / Protein: Negative / Nitrite: Negative   Leuk Esterase: Large / RBC: 0-2 /HPF / WBC 25-50 /HPF   Sq Epi: x / Non Sq Epi: x / Bacteria: x      Lactate Trend    CAPILLARY BLOOD GLUCOSE  240 (13 Sep 2017 00:22)    CXR PA/LA personally reviewed and clear lungs appreciated

## 2017-09-13 NOTE — H&P ADULT - HISTORY OF PRESENT ILLNESS
72F w/ asthma (childhood onset, intubatedx1 2016), NICM w/ HFrEF s/p AICD, DM2 on insulin, HTN, HLD presents to [facility] for asthma exacerbation. The pt reports being at home walking around doing chores when she suddenly developed an athsma attack w/ difficulty breathing and wheeze which was refractory to her nebulizer and rescue inhaler. She endorses completing a steroid taper approx 1 wk prior and since that period she has had sniffles and the feeling that she was having a cold. There was cough w/ yellow sputum but no fevers, chills, muscles aches, n/v/d. She indicates that she will have 3-4 episodes of asthma exacerbation a week only when she tries to use stairs; never experiences 72F w/ asthma (childhood onset, intubatedx1 2016), NICM w/ HFrEF s/p AICD, DM2 on insulin, HTN, HLD presents to [facility] for asthma exacerbation. The pt reports being at home walking around doing chores when she suddenly developed an athsma attack w/ difficulty breathing and wheeze which was refractory to her nebulizer and rescue inhaler. She endorses completing a steroid taper approx 1 wk prior and since that period she has had sniffles and the feeling that she was having a cold. There was cough w/ yellow sputum but no fevers, chills, muscles aches, n/v/d. She indicates that she will have 3-4 episodes of asthma exacerbation a week only when she tries to use stairs; never experiences exacerbation when sleeping or at rest.  She reports taking all of her medications.

## 2017-09-13 NOTE — H&P ADULT - PROBLEM SELECTOR PLAN 7
lovenox for dvt  Note Jehovas witness and therefore refusing blood products, documented in chart signed by patient

## 2017-09-13 NOTE — H&P ADULT - ASSESSMENT
72F w/ asthma (childhood onset, intubatedx1 2016), NICM w/ HFrEF s/p AICD, DM2 on insulin, HTN, HLD presents to [facility] for asthma exacerbation and is admitted to medicine for management of asthma exacerbation likely 2/2 UTI vs allergy 72F w/ asthma (childhood onset, intubatedx1 2016), NICM w/ HFrEF s/p AICD, DM2 on insulin, HTN, HLD presents to [facility] for asthma exacerbation and is admitted to medicine for management of asthma exacerbation likely 2/2 URI vs allergy

## 2017-09-13 NOTE — H&P ADULT - PROBLEM SELECTOR PLAN 2
Pt noted to have NICM w/ HFrEF s/p AICD w/ TTE documenting improvement in function w/ diastolic HF1 as well  - c/w asa, atorvastatin, enalapril, metoprolol not taking b.c of asthma

## 2017-09-13 NOTE — PROGRESS NOTE ADULT - ASSESSMENT
72F w/ asthma (childhood onset, intubatedx1 2016), NICM w/ HFrEF s/p AICD, DM2 on insulin, HTN, HLD presents to [facility] for SOB/wheezing likely 2/2 asthma vs. COPD exacerbation 2/2 URI vs. allergy.

## 2017-09-13 NOTE — PROGRESS NOTE ADULT - PROBLEM SELECTOR PLAN 2
Pt noted to have NICM w/ HFrEF s/p AICD w/ TTE documenting improvement in function w/ diastolic HF1 as well  - c/w asa, atorvastatin, enalapril,   -does not take metoprolol bc of asthma. Pt noted to have NICM w/ HFrEF s/p AICD w/ TTE documenting improvement in function w/ diastolic HF1 as well  - c/w asa, atorvastatin, enalapril  -does not take metoprolol bc of asthma.

## 2017-09-13 NOTE — PROGRESS NOTE ADULT - PROBLEM SELECTOR PLAN 1
Asthma class Mild to Moderate persistent. Pt states she has also been told she has COPD  - c/w duoneb, symbicort in hospital, prednisone 50mg (dosed x1 in ed continue daily), c/w monteleukast  - influenza vaccine offered but declined at this time  - pt tolearting full sentences and is now stable, c/w current level of treatment  - RVP negative, given hx would not r/o URI however not as likely. Asthma exacerbation possible c/w loratadine. additionally continue w/ pepcid for role of GERD in possible exacerbation  - CXR clear and patient afebrile not meeting sirs criteria.   - Giving azithromycin in setting of possible concurrent COPD exacerbation Asthma vs COPD exacerbation. Class Mild to Moderate persistent. VBG with Pco2 of 57, mildly elevated from baseline of 40s-50s. Pt currently in no respiratory distress.   - c/w duoneb, symbicort in hospital, prednisone 50mg qdaily Asthma vs COPD exacerbation. Class Mild to Moderate persistent. VBG with Pco2 of 57, mildly elevated from baseline of 40s-50s. Pt currently in no respiratory distress.   - c/w duoneb, symbicort in hospital, prednisone 50mg qdaily, singulair

## 2017-09-13 NOTE — PROGRESS NOTE ADULT - SUBJECTIVE AND OBJECTIVE BOX
Patient is a 72y old  Female who presents with a chief complaint of 72F w/ asthma exacerbation (13 Sep 2017 03:30)      SUBJECTIVE / OVERNIGHT EVENTS: 72F w/ asthma (childhood onset, intubatedx1 ), NICM w/ HFrEF s/p AICD, DM2 on insulin, HTN, HLD presents to [facility] for SOB/wheezing refractory to her home nebulizer treatments. She endorses completing a steroid taper approx 1 wk prior and since that period she has had sniffles and the feeling that she was having a cold. There was cough w/ yellow sputum but no fevers, chills, muscles aches, n/v/d. She indicates that she will have 3-4 episodes of asthma exacerbation a week only when she tries to use stairs; never experiences exacerbation when sleeping or at rest.  She reports taking all of her medications.    MEDICATIONS  (STANDING):  ALBUTerol/ipratropium for Nebulization 3 milliLiter(s) Nebulizer every 6 hours  insulin lispro (HumaLOG) corrective regimen sliding scale   SubCutaneous three times a day before meals  insulin lispro (HumaLOG) corrective regimen sliding scale   SubCutaneous at bedtime  dextrose 5%. 1000 milliLiter(s) (50 mL/Hr) IV Continuous <Continuous>  dextrose 50% Injectable 12.5 Gram(s) IV Push once  insulin glargine Injectable (LANTUS) 8 Unit(s) SubCutaneous at bedtime  dextrose 50% Injectable 25 Gram(s) IV Push once  dextrose 50% Injectable 25 Gram(s) IV Push once  aspirin enteric coated 81 milliGRAM(s) Oral daily  enalapril 10 milliGRAM(s) Oral two times a day  loratadine 10 milliGRAM(s) Oral daily  atorvastatin 20 milliGRAM(s) Oral at bedtime  famotidine    Tablet 10 milliGRAM(s) Oral daily  montelukast 10 milliGRAM(s) Oral daily  buDESOnide  80 MICROgram(s)/formoterol 4.5 MICROgram(s) Inhaler 2 Puff(s) Inhalation two times a day  enoxaparin Injectable 40 milliGRAM(s) SubCutaneous every 24 hours  predniSONE   Tablet 50 milliGRAM(s) Oral daily    MEDICATIONS  (PRN):  dextrose Gel 1 Dose(s) Oral once PRN Blood Glucose LESS THAN 70 milliGRAM(s)/deciliter  glucagon  Injectable 1 milliGRAM(s) IntraMuscular once PRN Glucose LESS THAN 70 milligrams/deciliter      I&O's Summary      Vital Signs Last 24 Hrs  T(C): 36.4 (13 Sep 2017 04:33), Max: 36.6 (12 Sep 2017 18:24)  T(F): 97.5 (13 Sep 2017 04:33), Max: 97.8 (12 Sep 2017 18:24)  HR: 76 (13 Sep 2017 06:50) (76 - 97)  BP: 132/81 (13 Sep 2017 04:33) (132/81 - 153/101)  BP(mean): --  RR: 18 (13 Sep 2017 04:33) (18 - 26)  SpO2: 98% (13 Sep 2017 06:50) (97% - 99%)  CAPILLARY BLOOD GLUCOSE  240 (13 Sep 2017 00:22)  279 (12 Sep 2017 22:07)          PHYSICAL EXAM:  GENERAL: NAD, well-developed  HEAD:  Atraumatic, Normocephalic  EYES: EOMI, PERRLA, conjunctiva and sclera clear  NECK: Supple, No JVD  CHEST/LUNG: Clear to auscultation bilaterally; No wheeze  HEART: Regular rate and rhythm; No murmurs, rubs, or gallops  ABDOMEN: Soft, Nontender, Nondistended; Bowel sounds present  EXTREMITIES:  2+ Peripheral Pulses, No clubbing, cyanosis, or edema  PSYCH: AAOx3  NEUROLOGY: non-focal  SKIN: No rashes or lesions    LABS:                        12.9   9.8   )-----------( 111      ( 12 Sep 2017 15:18 )             38.7     Auto Eosinophil # 0.2   / Auto Eosinophil % 2.3   / Auto Neutrophil # 6.4   / Auto Neutrophil % 65.2  / BANDS % x        -12    143  |  103  |  15  ----------------------------<  107<H>  4.9   |  24  |  0.97    Ca    9.2      12 Sep 2017 15:18  TPro  6.9  /  Alb  3.7  /  TBili  0.6  /  DBili  x   /  AST  21  /  ALT  19  /  AlkPhos  98  09-12          Urinalysis Basic - ( 12 Sep 2017 15:18 )    Color: x / Appearance: Clear / S.011 / pH: x  Gluc: x / Ketone: Negative  / Bili: Negative / Urobili: Negative   Blood: x / Protein: Negative / Nitrite: Negative   Leuk Esterase: Large / RBC: 0-2 /HPF / WBC 25-50 /HPF   Sq Epi: x / Non Sq Epi: x / Bacteria: x          RESPIRATORY  VENT:    ABG:     VB.34/57//30    RADIOLOGY & ADDITIONAL TESTS:    Imaging Personally Reviewed:    Consultant(s) Notes Reviewed:      Care Discussed with Consultants/Other Providers: Patient is a 72y old  Female who presents with a chief complaint of 72F w/ asthma exacerbation (13 Sep 2017 03:30)    MEDICINE ACCEPT NOTE  SUBJECTIVE / OVERNIGHT EVENTS: 72F w/ asthma (childhood onset, intubatedx1 ), NICM w/ HFrEF s/p AICD, DM2 on insulin, HTN, HLD presents to [facility] for SOB/wheezing refractory to her home nebulizer treatments. She endorses completing a steroid taper approx 1 wk prior and since that period she has had sniffles and the feeling that she was having a cold. There was cough w/ yellow sputum but no fevers, chills, muscles aches, n/v/d. She has been told she also has COPD. Follows with Dr. Hendrix. Overall, pt feels better then when she came in yesterday. She was evaluated at bedside-seen ambulating freely, off oxygen.     MEDICATIONS  (STANDING):  ALBUTerol/ipratropium for Nebulization 3 milliLiter(s) Nebulizer every 6 hours  insulin lispro (HumaLOG) corrective regimen sliding scale   SubCutaneous three times a day before meals  insulin lispro (HumaLOG) corrective regimen sliding scale   SubCutaneous at bedtime  dextrose 5%. 1000 milliLiter(s) (50 mL/Hr) IV Continuous <Continuous>  dextrose 50% Injectable 12.5 Gram(s) IV Push once  insulin glargine Injectable (LANTUS) 8 Unit(s) SubCutaneous at bedtime  dextrose 50% Injectable 25 Gram(s) IV Push once  dextrose 50% Injectable 25 Gram(s) IV Push once  aspirin enteric coated 81 milliGRAM(s) Oral daily  enalapril 10 milliGRAM(s) Oral two times a day  loratadine 10 milliGRAM(s) Oral daily  atorvastatin 20 milliGRAM(s) Oral at bedtime  famotidine    Tablet 10 milliGRAM(s) Oral daily  montelukast 10 milliGRAM(s) Oral daily  buDESOnide  80 MICROgram(s)/formoterol 4.5 MICROgram(s) Inhaler 2 Puff(s) Inhalation two times a day  enoxaparin Injectable 40 milliGRAM(s) SubCutaneous every 24 hours  predniSONE   Tablet 50 milliGRAM(s) Oral daily    MEDICATIONS  (PRN):  dextrose Gel 1 Dose(s) Oral once PRN Blood Glucose LESS THAN 70 milliGRAM(s)/deciliter  glucagon  Injectable 1 milliGRAM(s) IntraMuscular once PRN Glucose LESS THAN 70 milligrams/deciliter      I&O's Summary      Vital Signs Last 24 Hrs  T(C): 36.4 (13 Sep 2017 04:33), Max: 36.6 (12 Sep 2017 18:24)  T(F): 97.5 (13 Sep 2017 04:33), Max: 97.8 (12 Sep 2017 18:24)  HR: 76 (13 Sep 2017 06:50) (76 - 97)  BP: 132/81 (13 Sep 2017 04:33) (132/81 - 153/101)  BP(mean): --  RR: 18 (13 Sep 2017 04:33) (18 - 26)  SpO2: 98% (13 Sep 2017 06:50) (97% - 99%)  CAPILLARY BLOOD GLUCOSE  240 (13 Sep 2017 00:22)  279 (12 Sep 2017 22:07)      PHYSICAL EXAM:  GENERAL: NAD, well-developed  HEAD:  Atraumatic, Normocephalic  EYES: EOMI, PERRLA, conjunctiva and sclera clear  NECK: Supple, No JVD  CHEST/LUNG: diffuse wheezing.   HEART: Regular rate and rhythm; No murmurs, rubs, or gallops  ABDOMEN: Soft, Nontender, Nondistended; Bowel sounds present  EXTREMITIES:  2+ Peripheral Pulses, No clubbing, cyanosis, or edema  PSYCH: AAOx3  NEUROLOGY: non-focal  SKIN: No rashes or lesions    LABS:                        12.9   9.8   )-----------( 111      ( 12 Sep 2017 15:18 )             38.7     Auto Eosinophil # 0.2   / Auto Eosinophil % 2.3   / Auto Neutrophil # 6.4   / Auto Neutrophil % 65.2  / BANDS % x        -12    143  |  103  |  15  ----------------------------<  107<H>  4.9   |  24  |  0.97    Ca    9.2      12 Sep 2017 15:18  TPro  6.9  /  Alb  3.7  /  TBili  0.6  /  DBili  x   /  AST  21  /  ALT  19  /  AlkPhos  98  09-12          Urinalysis Basic - ( 12 Sep 2017 15:18 )    Color: x / Appearance: Clear / S.011 / pH: x  Gluc: x / Ketone: Negative  / Bili: Negative / Urobili: Negative   Blood: x / Protein: Negative / Nitrite: Negative   Leuk Esterase: Large / RBC: 0-2 /HPF / WBC 25-50 /HPF   Sq Epi: x / Non Sq Epi: x / Bacteria: x          RESPIRATORY  VENT:    ABG:     VB.34/57/30/30    RADIOLOGY & ADDITIONAL TESTS:    Imaging Personally Reviewed:    Consultant(s) Notes Reviewed:      Care Discussed with Consultants/Other Providers: Patient is a 72y old  Female who presents with a chief complaint of 72F w/ asthma exacerbation (13 Sep 2017 03:30)    MEDICINE ACCEPT NOTE  SUBJECTIVE / OVERNIGHT EVENTS: 72F w/ asthma (childhood onset, intubatedx1 ), NICM w/ HFrEF s/p AICD, DM2 on insulin, HTN, HLD presents to [facility] for SOB/wheezing refractory to her home nebulizer treatments. She endorses completing a steroid taper approx 1 wk prior and since that period she has had sniffles and the feeling that she was having a cold. There was cough w/ yellow sputum but no fevers, chills, muscles aches, n/v/d. She has been told she also has COPD. Follows with Dr. Hendrix. Overall, pt feels better then when she came in yesterday. She was evaluated at bedside-seen ambulating freely, off oxygen.     ROS  :   General             :  No fever , no chills  HEENT              :  No sore throat, no dyphasia , no bleeding gum   Respiratory       :  pos cough, no hemoptysis , improved SOB as per patient   Cardiovascular  :  No chest pain , no SOB, no palpitation   GI                    :   No abdominal pain , no diarrhea , no vomiting , no constipation ( she moved her bowel today)                      :    No dysuria or hematuria   Musculoskeletal: improved back pain and pain in the feet.    Neuro             : no change in mentation     MEDICATIONS  (STANDING):  ALBUTerol/ipratropium for Nebulization 3 milliLiter(s) Nebulizer every 6 hours  insulin lispro (HumaLOG) corrective regimen sliding scale   SubCutaneous three times a day before meals  insulin lispro (HumaLOG) corrective regimen sliding scale   SubCutaneous at bedtime  dextrose 5%. 1000 milliLiter(s) (50 mL/Hr) IV Continuous <Continuous>  dextrose 50% Injectable 12.5 Gram(s) IV Push once  insulin glargine Injectable (LANTUS) 8 Unit(s) SubCutaneous at bedtime  dextrose 50% Injectable 25 Gram(s) IV Push once  dextrose 50% Injectable 25 Gram(s) IV Push once  aspirin enteric coated 81 milliGRAM(s) Oral daily  enalapril 10 milliGRAM(s) Oral two times a day  loratadine 10 milliGRAM(s) Oral daily  atorvastatin 20 milliGRAM(s) Oral at bedtime  famotidine    Tablet 10 milliGRAM(s) Oral daily  montelukast 10 milliGRAM(s) Oral daily  buDESOnide  80 MICROgram(s)/formoterol 4.5 MICROgram(s) Inhaler 2 Puff(s) Inhalation two times a day  enoxaparin Injectable 40 milliGRAM(s) SubCutaneous every 24 hours  predniSONE   Tablet 50 milliGRAM(s) Oral daily    MEDICATIONS  (PRN):  dextrose Gel 1 Dose(s) Oral once PRN Blood Glucose LESS THAN 70 milliGRAM(s)/deciliter  glucagon  Injectable 1 milliGRAM(s) IntraMuscular once PRN Glucose LESS THAN 70 milligrams/deciliter      I&O's Summary      Vital Signs Last 24 Hrs  T(C): 36.4 (13 Sep 2017 04:33), Max: 36.6 (12 Sep 2017 18:24)  T(F): 97.5 (13 Sep 2017 04:33), Max: 97.8 (12 Sep 2017 18:24)  HR: 76 (13 Sep 2017 06:50) (76 - 97)  BP: 132/81 (13 Sep 2017 04:33) (132/81 - 153/101)  BP(mean): --  RR: 18 (13 Sep 2017 04:33) (18 - 26)  SpO2: 98% (13 Sep 2017 06:50) (97% - 99%)  CAPILLARY BLOOD GLUCOSE  240 (13 Sep 2017 00:22)  279 (12 Sep 2017 22:07)      PHYSICAL EXAM:  GENERAL: NAD, well-developed  HEAD:  Atraumatic, Normocephalic  EYES: EOMI, PERRLA, conjunctiva and sclera clear  NECK: Supple, No JVD  CHEST/LUNG: diffuse wheezing, no retraction  HEART: Regular rate and rhythm; No murmurs, rubs, or gallops  ABDOMEN: Soft, Nontender, Nondistended; Bowel sounds present  EXTREMITIES:  2+ Peripheral Pulses, No clubbing, cyanosis, or edema  PSYCH: AAOx3  NEUROLOGY: non-focal  SKIN: No rashes or lesions    LABS:                        12.9   9.8   )-----------( 111      ( 12 Sep 2017 15:18 )             38.7     Auto Eosinophil # 0.2   / Auto Eosinophil % 2.3   / Auto Neutrophil # 6.4   / Auto Neutrophil % 65.2  / BANDS % x        09-12    143  |  103  |  15  ----------------------------<  107<H>  4.9   |  24  |  0.97    Ca    9.2      12 Sep 2017 15:18  TPro  6.9  /  Alb  3.7  /  TBili  0.6  /  DBili  x   /  AST  21  /  ALT  19  /  AlkPhos  98  09-12          Urinalysis Basic - ( 12 Sep 2017 15:18 )    Color: x / Appearance: Clear / S.011 / pH: x  Gluc: x / Ketone: Negative  / Bili: Negative / Urobili: Negative   Blood: x / Protein: Negative / Nitrite: Negative   Leuk Esterase: Large / RBC: 0-2 /HPF / WBC 25-50 /HPF   Sq Epi: x / Non Sq Epi: x / Bacteria: x          RESPIRATORY  VENT:    ABG:     VB.34/57/30/30    RADIOLOGY & ADDITIONAL TESTS:    Imaging Personally Reviewed:    Consultant(s) Notes Reviewed:      Care Discussed with Consultants/Other Providers:

## 2017-09-13 NOTE — H&P ADULT - NSHPREVIEWOFSYSTEMS_GEN_ALL_CORE
CONSTITUTIONAL: No fever, no chills  EYES: No eye pain, no visual disturbance  Mouth: no pain in mouth, no cavities->has dentures  RESPIRATORY: cough+, wheeze+  CARDIOVASCULAR: No CP, no palpitations  GASTROINTESTINAL: no abdominal pain, no n/v/d  GENITOURINARY: No dysuria, no hematuria  NEUROLOGICAL: headache+, no weakness  SKIN: No itching, no rashes  MUSCULOSKELETAL: No joint pain, no joint swelling

## 2017-09-13 NOTE — H&P ADULT - NSHPPHYSICALEXAM_GEN_ALL_CORE
Vital Signs Last 24 Hrs  T(C): 36.6 (12 Sep 2017 22:36), Max: 36.6 (12 Sep 2017 18:24)  T(F): 97.8 (12 Sep 2017 22:36), Max: 97.8 (12 Sep 2017 18:24)  HR: 83 (12 Sep 2017 22:36) (83 - 97)  BP: 132/86 (12 Sep 2017 22:36) (132/86 - 153/101)  BP(mean): --  RR: 18 (12 Sep 2017 22:36) (18 - 26)  SpO2: 99% (12 Sep 2017 22:36) (98% - 99%) on 3L    GENERAL: NAD, well-developed  HEAD:  Atraumatic, Normocephalic  EYES: EOMI, PERRLA, conjunctiva and sclera clear  Mouth: MMM, no lesions  NECK: Supple, no appreciable masses  Pulm: normal work of breathing, mild to moderate wheezing diffusely on aucultation, full sentences  Card: S1&S2+, rrr, no m/r/g appreciated  ABDOMEN: bs+, soft, nt, nd, no appreciable masses  EXTREMITIES:  2+ Peripheral Pulses, No clubbing, cyanosis, 1+ pitting edema to knee b/l  Neuro: A&Ox3, no focal deficits  SKIN: warm and dry, no visible rashses

## 2017-09-13 NOTE — H&P ADULT - PROBLEM SELECTOR PLAN 1
Asthma class Mild to Moderate persistent  - c/w duoneb, symbicort in hospital, prednisone 50mg (dosed x1 in ed continue daily), c/w monteleukast  - influenza vaccine offered but declined at this time  - pt tolearting full sentences and is now stable, c/w current level of treatment  - RVP negative, given hx would not r/o URI however not as likely. Asthma exacerbation possible c/w loratadine. additionally continue w/ pepcid for role of GERD in possible exacerbation Asthma class Mild to Moderate persistent  - c/w duoneb, symbicort in hospital, prednisone 50mg (dosed x1 in ed continue daily), c/w monteleukast  - influenza vaccine offered but declined at this time  - pt tolearting full sentences and is now stable, c/w current level of treatment  - RVP negative, given hx would not r/o URI however not as likely. Asthma exacerbation possible c/w loratadine. additionally continue w/ pepcid for role of GERD in possible exacerbation  - CXR clear and patient afebrile not meeting sirs criteria. pna not suspected. monitor off abx. Asthma class Mild to Moderate persistent. Pt states she has also been told she has COPD  - c/w duoneb, symbicort in hospital, prednisone 50mg (dosed x1 in ed continue daily), c/w monteleukast  - influenza vaccine offered but declined at this time  - pt tolearting full sentences and is now stable, c/w current level of treatment  - RVP negative, given hx would not r/o URI however not as likely. Asthma exacerbation possible c/w loratadine. additionally continue w/ pepcid for role of GERD in possible exacerbation  - CXR clear and patient afebrile not meeting sirs criteria.   - Giving azithromycin in setting of possible concurrent COPD exacerbation

## 2017-09-14 DIAGNOSIS — R35.8 OTHER POLYURIA: ICD-10-CM

## 2017-09-14 DIAGNOSIS — N39.0 URINARY TRACT INFECTION, SITE NOT SPECIFIED: ICD-10-CM

## 2017-09-14 DIAGNOSIS — K59.00 CONSTIPATION, UNSPECIFIED: ICD-10-CM

## 2017-09-14 LAB
ANION GAP SERPL CALC-SCNC: 13 MMOL/L — SIGNIFICANT CHANGE UP (ref 5–17)
APPEARANCE UR: CLEAR — SIGNIFICANT CHANGE UP
BACTERIA # UR AUTO: ABNORMAL /HPF
BASE EXCESS BLDV CALC-SCNC: 1.5 MMOL/L — SIGNIFICANT CHANGE UP (ref -2–2)
BILIRUB UR-MCNC: NEGATIVE — SIGNIFICANT CHANGE UP
BUN SERPL-MCNC: 23 MG/DL — SIGNIFICANT CHANGE UP (ref 7–23)
CA-I SERPL-SCNC: 1.2 MMOL/L — SIGNIFICANT CHANGE UP (ref 1.12–1.3)
CALCIUM SERPL-MCNC: 9.5 MG/DL — SIGNIFICANT CHANGE UP (ref 8.4–10.5)
CHLORIDE BLDV-SCNC: 104 MMOL/L — SIGNIFICANT CHANGE UP (ref 96–108)
CHLORIDE SERPL-SCNC: 102 MMOL/L — SIGNIFICANT CHANGE UP (ref 96–108)
CO2 BLDV-SCNC: 28 MMOL/L — SIGNIFICANT CHANGE UP (ref 22–30)
CO2 SERPL-SCNC: 25 MMOL/L — SIGNIFICANT CHANGE UP (ref 22–31)
COLOR SPEC: COLORLESS — SIGNIFICANT CHANGE UP
CREAT SERPL-MCNC: 0.83 MG/DL — SIGNIFICANT CHANGE UP (ref 0.5–1.3)
DIFF PNL FLD: NEGATIVE — SIGNIFICANT CHANGE UP
GAS PNL BLDV: 139 MMOL/L — SIGNIFICANT CHANGE UP (ref 136–145)
GAS PNL BLDV: SIGNIFICANT CHANGE UP
GLUCOSE BLDV-MCNC: 275 MG/DL — HIGH (ref 70–99)
GLUCOSE SERPL-MCNC: 276 MG/DL — HIGH (ref 70–99)
GLUCOSE UR QL: >1000
HCO3 BLDV-SCNC: 26 MMOL/L — SIGNIFICANT CHANGE UP (ref 21–29)
HCT VFR BLD CALC: 38.1 % — SIGNIFICANT CHANGE UP (ref 34.5–45)
HCT VFR BLDA CALC: 38 % — LOW (ref 39–50)
HGB BLD CALC-MCNC: 12.4 G/DL — SIGNIFICANT CHANGE UP (ref 11.5–15.5)
HGB BLD-MCNC: 12.5 G/DL — SIGNIFICANT CHANGE UP (ref 11.5–15.5)
KETONES UR-MCNC: NEGATIVE — SIGNIFICANT CHANGE UP
LACTATE BLDV-MCNC: 2 MMOL/L — SIGNIFICANT CHANGE UP (ref 0.7–2)
LEUKOCYTE ESTERASE UR-ACNC: ABNORMAL
MCHC RBC-ENTMCNC: 32.7 GM/DL — SIGNIFICANT CHANGE UP (ref 32–36)
MCHC RBC-ENTMCNC: 33.1 PG — SIGNIFICANT CHANGE UP (ref 27–34)
MCV RBC AUTO: 101 FL — HIGH (ref 80–100)
NITRITE UR-MCNC: NEGATIVE — SIGNIFICANT CHANGE UP
OTHER CELLS CSF MANUAL: 13 ML/DL — LOW (ref 18–22)
PCO2 BLDV: 46 MMHG — SIGNIFICANT CHANGE UP (ref 35–50)
PH BLDV: 7.38 — SIGNIFICANT CHANGE UP (ref 7.35–7.45)
PH UR: 6.5 — SIGNIFICANT CHANGE UP (ref 5–8)
PLATELET # BLD AUTO: 112 K/UL — LOW (ref 150–400)
PO2 BLDV: 45 MMHG — SIGNIFICANT CHANGE UP (ref 25–45)
POTASSIUM BLDV-SCNC: 4.1 MMOL/L — SIGNIFICANT CHANGE UP (ref 3.5–5)
POTASSIUM SERPL-MCNC: 4.7 MMOL/L — SIGNIFICANT CHANGE UP (ref 3.5–5.3)
POTASSIUM SERPL-SCNC: 4.7 MMOL/L — SIGNIFICANT CHANGE UP (ref 3.5–5.3)
PROT UR-MCNC: SIGNIFICANT CHANGE UP
RBC # BLD: 3.77 M/UL — LOW (ref 3.8–5.2)
RBC # FLD: 12.3 % — SIGNIFICANT CHANGE UP (ref 10.3–14.5)
RBC CASTS # UR COMP ASSIST: SIGNIFICANT CHANGE UP /HPF (ref 0–2)
SAO2 % BLDV: 78 % — SIGNIFICANT CHANGE UP (ref 67–88)
SODIUM SERPL-SCNC: 140 MMOL/L — SIGNIFICANT CHANGE UP (ref 135–145)
SP GR SPEC: 1.02 — SIGNIFICANT CHANGE UP (ref 1.01–1.02)
TSH SERPL-MCNC: 0.18 UIU/ML — LOW (ref 0.27–4.2)
UROBILINOGEN FLD QL: NEGATIVE — SIGNIFICANT CHANGE UP
WBC # BLD: 11 K/UL — HIGH (ref 3.8–10.5)
WBC # FLD AUTO: 11 K/UL — HIGH (ref 3.8–10.5)
WBC UR QL: ABNORMAL /HPF (ref 0–5)

## 2017-09-14 PROCEDURE — 99233 SBSQ HOSP IP/OBS HIGH 50: CPT | Mod: GC

## 2017-09-14 RX ORDER — SENNA PLUS 8.6 MG/1
2 TABLET ORAL AT BEDTIME
Qty: 0 | Refills: 0 | Status: DISCONTINUED | OUTPATIENT
Start: 2017-09-14 | End: 2017-09-20

## 2017-09-14 RX ORDER — CEFTRIAXONE 500 MG/1
1 INJECTION, POWDER, FOR SOLUTION INTRAMUSCULAR; INTRAVENOUS EVERY 24 HOURS
Qty: 0 | Refills: 0 | Status: COMPLETED | OUTPATIENT
Start: 2017-09-14 | End: 2017-09-16

## 2017-09-14 RX ORDER — DOCUSATE SODIUM 100 MG
100 CAPSULE ORAL THREE TIMES A DAY
Qty: 0 | Refills: 0 | Status: DISCONTINUED | OUTPATIENT
Start: 2017-09-14 | End: 2017-09-20

## 2017-09-14 RX ORDER — SODIUM CHLORIDE 0.65 %
1 AEROSOL, SPRAY (ML) NASAL THREE TIMES A DAY
Qty: 0 | Refills: 0 | Status: DISCONTINUED | OUTPATIENT
Start: 2017-09-14 | End: 2017-09-21

## 2017-09-14 RX ORDER — POLYETHYLENE GLYCOL 3350 17 G/17G
17 POWDER, FOR SOLUTION ORAL DAILY
Qty: 0 | Refills: 0 | Status: DISCONTINUED | OUTPATIENT
Start: 2017-09-14 | End: 2017-09-20

## 2017-09-14 RX ORDER — ACETAMINOPHEN 500 MG
650 TABLET ORAL ONCE
Qty: 0 | Refills: 0 | Status: COMPLETED | OUTPATIENT
Start: 2017-09-14 | End: 2017-09-14

## 2017-09-14 RX ORDER — CEFTRIAXONE 500 MG/1
1 INJECTION, POWDER, FOR SOLUTION INTRAMUSCULAR; INTRAVENOUS EVERY 24 HOURS
Qty: 0 | Refills: 0 | Status: DISCONTINUED | OUTPATIENT
Start: 2017-09-14 | End: 2017-09-14

## 2017-09-14 RX ADMIN — Medication 100 MILLIGRAM(S): at 12:04

## 2017-09-14 RX ADMIN — FAMOTIDINE 10 MILLIGRAM(S): 10 INJECTION INTRAVENOUS at 12:03

## 2017-09-14 RX ADMIN — CEFTRIAXONE 100 GRAM(S): 500 INJECTION, POWDER, FOR SOLUTION INTRAMUSCULAR; INTRAVENOUS at 16:30

## 2017-09-14 RX ADMIN — Medication 50 MILLIGRAM(S): at 05:46

## 2017-09-14 RX ADMIN — Medication 3 MILLILITER(S): at 18:37

## 2017-09-14 RX ADMIN — BUDESONIDE AND FORMOTEROL FUMARATE DIHYDRATE 2 PUFF(S): 160; 4.5 AEROSOL RESPIRATORY (INHALATION) at 18:37

## 2017-09-14 RX ADMIN — LORATADINE 10 MILLIGRAM(S): 10 TABLET ORAL at 12:00

## 2017-09-14 RX ADMIN — Medication 1 SPRAY(S): at 21:30

## 2017-09-14 RX ADMIN — Medication 100 MILLIGRAM(S): at 05:46

## 2017-09-14 RX ADMIN — Medication 3: at 13:33

## 2017-09-14 RX ADMIN — Medication 100 MILLIGRAM(S): at 21:30

## 2017-09-14 RX ADMIN — Medication 81 MILLIGRAM(S): at 12:00

## 2017-09-14 RX ADMIN — INSULIN GLARGINE 8 UNIT(S): 100 INJECTION, SOLUTION SUBCUTANEOUS at 21:41

## 2017-09-14 RX ADMIN — BUDESONIDE AND FORMOTEROL FUMARATE DIHYDRATE 2 PUFF(S): 160; 4.5 AEROSOL RESPIRATORY (INHALATION) at 06:32

## 2017-09-14 RX ADMIN — Medication 650 MILLIGRAM(S): at 05:37

## 2017-09-14 RX ADMIN — POLYETHYLENE GLYCOL 3350 17 GRAM(S): 17 POWDER, FOR SOLUTION ORAL at 21:30

## 2017-09-14 RX ADMIN — Medication 1: at 09:02

## 2017-09-14 RX ADMIN — Medication 10 MILLIGRAM(S): at 05:46

## 2017-09-14 RX ADMIN — Medication 3 MILLILITER(S): at 06:32

## 2017-09-14 RX ADMIN — Medication 10 MILLIGRAM(S): at 17:29

## 2017-09-14 RX ADMIN — ENOXAPARIN SODIUM 40 MILLIGRAM(S): 100 INJECTION SUBCUTANEOUS at 05:46

## 2017-09-14 RX ADMIN — ATORVASTATIN CALCIUM 20 MILLIGRAM(S): 80 TABLET, FILM COATED ORAL at 21:30

## 2017-09-14 RX ADMIN — Medication 3 MILLILITER(S): at 12:14

## 2017-09-14 RX ADMIN — Medication 4: at 17:28

## 2017-09-14 RX ADMIN — MONTELUKAST 10 MILLIGRAM(S): 4 TABLET, CHEWABLE ORAL at 12:00

## 2017-09-14 NOTE — PROGRESS NOTE ADULT - PROBLEM SELECTOR PLAN 1
Asthma vs COPD exacerbation. Pt feels better, still with diffuse wheezing on exam however.    - c/w duoneb around the clock, symbicort in hospital, prednisone 50mg qdaily, singulair  -will likely need slow steroid taper.

## 2017-09-14 NOTE — PROGRESS NOTE ADULT - PROBLEM SELECTOR PLAN 2
Pt noted to have NICM w/ HFrEF s/p AICD w/ TTE documenting improvement in function w/ diastolic HF1 as well  - c/w asa, atorvastatin, enalapril  -does not take metoprolol bc of asthma. Pt with polyuria, no dysuria. UA +  -will obtain urine culture and start ceftriaxone.   -will de-escalate pending urine culture Pt with polyuria since yesterday. Ucx on admission negative for infection.   -will repeat urinalysis

## 2017-09-14 NOTE — PROGRESS NOTE ADULT - ASSESSMENT
72F w/ asthma (childhood onset, intubatedx1 2016), NICM w/ HFrEF s/p AICD, DM2 on insulin, HTN, HLD presents to [facility] for SOB/wheezing likely 2/2 asthma vs. COPD exacerbation 2/2 URI vs. allergy.

## 2017-09-14 NOTE — PROGRESS NOTE ADULT - PROBLEM SELECTOR PLAN 5
-will monitor creatinine and electrolytes. Last BM 2 days ago.   -started on colace overnight  -will start senna and miralax prn

## 2017-09-14 NOTE — PROGRESS NOTE ADULT - SUBJECTIVE AND OBJECTIVE BOX
Patient is a 72y old  Female who presents with a chief complaint of 72F w/ asthma exacerbation (13 Sep 2017 03:30)      SUBJECTIVE / OVERNIGHT EVENTS: No acute events overnight. Pt states she feels slightly better than when she came. No fevers, chills, resp distress. Complaining of constipation, she states she has a BM everyday but did not have one yesterday. +cough.     MEDICATIONS  (STANDING):  ALBUTerol/ipratropium for Nebulization 3 milliLiter(s) Nebulizer every 6 hours  insulin lispro (HumaLOG) corrective regimen sliding scale   SubCutaneous three times a day before meals  insulin lispro (HumaLOG) corrective regimen sliding scale   SubCutaneous at bedtime  dextrose 5%. 1000 milliLiter(s) (50 mL/Hr) IV Continuous <Continuous>  dextrose 50% Injectable 12.5 Gram(s) IV Push once  insulin glargine Injectable (LANTUS) 8 Unit(s) SubCutaneous at bedtime  dextrose 50% Injectable 25 Gram(s) IV Push once  dextrose 50% Injectable 25 Gram(s) IV Push once  aspirin enteric coated 81 milliGRAM(s) Oral daily  enalapril 10 milliGRAM(s) Oral two times a day  loratadine 10 milliGRAM(s) Oral daily  atorvastatin 20 milliGRAM(s) Oral at bedtime  famotidine    Tablet 10 milliGRAM(s) Oral daily  montelukast 10 milliGRAM(s) Oral daily  buDESOnide  80 MICROgram(s)/formoterol 4.5 MICROgram(s) Inhaler 2 Puff(s) Inhalation two times a day  enoxaparin Injectable 40 milliGRAM(s) SubCutaneous every 24 hours  predniSONE   Tablet 50 milliGRAM(s) Oral daily  benzonatate 100 milliGRAM(s) Oral three times a day  docusate sodium 100 milliGRAM(s) Oral three times a day    MEDICATIONS  (PRN):  dextrose Gel 1 Dose(s) Oral once PRN Blood Glucose LESS THAN 70 milliGRAM(s)/deciliter  glucagon  Injectable 1 milliGRAM(s) IntraMuscular once PRN Glucose LESS THAN 70 milligrams/deciliter      I&O's Summary      Vital Signs Last 24 Hrs  T(C): 36.6 (14 Sep 2017 04:59), Max: 36.7 (13 Sep 2017 21:33)  T(F): 97.9 (14 Sep 2017 04:59), Max: 98 (13 Sep 2017 21:33)  HR: 70 (14 Sep 2017 06:32) (48 - 91)  BP: 131/81 (14 Sep 2017 04:59) (131/81 - 140/72)  BP(mean): --  RR: 18 (14 Sep 2017 04:59) (18 - 18)  SpO2: 96% (14 Sep 2017 06:32) (96% - 99%)  CAPILLARY BLOOD GLUCOSE  169 (13 Sep 2017 22:10)  244 (13 Sep 2017 17:47)  270 (13 Sep 2017 12:31)  213 (13 Sep 2017 08:26)          PHYSICAL EXAM:  GENERAL: NAD, well-developed  HEAD:  Atraumatic, Normocephalic  EYES: EOMI, PERRLA, conjunctiva and sclera clear  NECK: Supple, No JVD  CHEST/LUNG: diffuse expiratory wheezing.   HEART: Regular rate and rhythm; No murmurs, rubs, or gallops  ABDOMEN: Soft, Nontender, Nondistended; Bowel sounds present  EXTREMITIES:  2+ Peripheral Pulses, No clubbing, cyanosis, or edema  PSYCH: AAOx3  NEUROLOGY: non-focal  SKIN: No rashes or lesions    LABS:                        12.9   9.8   )-----------( 111      ( 12 Sep 2017 15:18 )             38.7     Auto Eosinophil # 0.2   / Auto Eosinophil % 2.3   / Auto Neutrophil # 6.4   / Auto Neutrophil % 65.2  / BANDS % x        -12    143  |  103  |  15  ----------------------------<  107<H>  4.9   |  24  |  0.97    Ca    9.2      12 Sep 2017 15:18  TPro  6.9  /  Alb  3.7  /  TBili  0.6  /  DBili  x   /  AST  21  /  ALT  19  /  AlkPhos  98  09-12          Urinalysis Basic - ( 12 Sep 2017 15:18 )    Color: x / Appearance: Clear / S.011 / pH: x  Gluc: x / Ketone: Negative  / Bili: Negative / Urobili: Negative   Blood: x / Protein: Negative / Nitrite: Negative   Leuk Esterase: Large / RBC: 0-2 /HPF / WBC 25-50 /HPF   Sq Epi: x / Non Sq Epi: x / Bacteria: x          RESPIRATORY  VENT:    ABG:     VBG:     RADIOLOGY & ADDITIONAL TESTS:    Imaging Personally Reviewed:    Consultant(s) Notes Reviewed:      Care Discussed with Consultants/Other Providers: Patient is a 72y old  Female who presents with a chief complaint of 72F w/ asthma exacerbation (13 Sep 2017 03:30)      SUBJECTIVE / OVERNIGHT EVENTS: No acute events overnight. Pt states she feels slightly better than when she came but still feels like shes wheezing. No fevers, chills, resp distress. Complaining of constipation, she states she has a BM everyday but did not have one yesterday. +cough. ALso complaining of increased urination. No dysuria.     MEDICATIONS  (STANDING):  ALBUTerol/ipratropium for Nebulization 3 milliLiter(s) Nebulizer every 6 hours  insulin lispro (HumaLOG) corrective regimen sliding scale   SubCutaneous three times a day before meals  insulin lispro (HumaLOG) corrective regimen sliding scale   SubCutaneous at bedtime  dextrose 5%. 1000 milliLiter(s) (50 mL/Hr) IV Continuous <Continuous>  dextrose 50% Injectable 12.5 Gram(s) IV Push once  insulin glargine Injectable (LANTUS) 8 Unit(s) SubCutaneous at bedtime  dextrose 50% Injectable 25 Gram(s) IV Push once  dextrose 50% Injectable 25 Gram(s) IV Push once  aspirin enteric coated 81 milliGRAM(s) Oral daily  enalapril 10 milliGRAM(s) Oral two times a day  loratadine 10 milliGRAM(s) Oral daily  atorvastatin 20 milliGRAM(s) Oral at bedtime  famotidine    Tablet 10 milliGRAM(s) Oral daily  montelukast 10 milliGRAM(s) Oral daily  buDESOnide  80 MICROgram(s)/formoterol 4.5 MICROgram(s) Inhaler 2 Puff(s) Inhalation two times a day  enoxaparin Injectable 40 milliGRAM(s) SubCutaneous every 24 hours  predniSONE   Tablet 50 milliGRAM(s) Oral daily  benzonatate 100 milliGRAM(s) Oral three times a day  docusate sodium 100 milliGRAM(s) Oral three times a day    MEDICATIONS  (PRN):  dextrose Gel 1 Dose(s) Oral once PRN Blood Glucose LESS THAN 70 milliGRAM(s)/deciliter  glucagon  Injectable 1 milliGRAM(s) IntraMuscular once PRN Glucose LESS THAN 70 milligrams/deciliter      I&O's Summary      Vital Signs Last 24 Hrs  T(C): 36.6 (14 Sep 2017 04:59), Max: 36.7 (13 Sep 2017 21:33)  T(F): 97.9 (14 Sep 2017 04:59), Max: 98 (13 Sep 2017 21:33)  HR: 70 (14 Sep 2017 06:32) (48 - 91)  BP: 131/81 (14 Sep 2017 04:59) (131/81 - 140/72)  BP(mean): --  RR: 18 (14 Sep 2017 04:59) (18 - 18)  SpO2: 96% (14 Sep 2017 06:32) (96% - 99%)  CAPILLARY BLOOD GLUCOSE  169 (13 Sep 2017 22:10)  244 (13 Sep 2017 17:47)  270 (13 Sep 2017 12:31)  213 (13 Sep 2017 08:26)          PHYSICAL EXAM:  GENERAL: NAD, well-developed  HEAD:  Atraumatic, Normocephalic  EYES: EOMI, PERRLA, conjunctiva and sclera clear  NECK: Supple, No JVD  CHEST/LUNG: diffuse expiratory wheezing.   HEART: Regular rate and rhythm; No murmurs, rubs, or gallops  ABDOMEN: Soft, Nontender, Nondistended; Bowel sounds present  EXTREMITIES:  2+ Peripheral Pulses, No clubbing, cyanosis, or edema  PSYCH: AAOx3  NEUROLOGY: non-focal  SKIN: No rashes or lesions    LABS:                        12.9   9.8   )-----------( 111      ( 12 Sep 2017 15:18 )             38.7     Auto Eosinophil # 0.2   / Auto Eosinophil % 2.3   / Auto Neutrophil # 6.4   / Auto Neutrophil % 65.2  / BANDS % x        -12    143  |  103  |  15  ----------------------------<  107<H>  4.9   |  24  |  0.97    Ca    9.2      12 Sep 2017 15:18  TPro  6.9  /  Alb  3.7  /  TBili  0.6  /  DBili  x   /  AST  21  /  ALT  19  /  AlkPhos  98  -12          Urinalysis Basic - ( 12 Sep 2017 15:18 )    Color: x / Appearance: Clear / S.011 / pH: x  Gluc: x / Ketone: Negative  / Bili: Negative / Urobili: Negative   Blood: x / Protein: Negative / Nitrite: Negative   Leuk Esterase: Large / RBC: 0-2 /HPF / WBC 25-50 /HPF   Sq Epi: x / Non Sq Epi: x / Bacteria: x          RESPIRATORY  VENT:    ABG:     VBG:     RADIOLOGY & ADDITIONAL TESTS:    Imaging Personally Reviewed:    Consultant(s) Notes Reviewed:      Care Discussed with Consultants/Other Providers: Patient is a 72y old  Female who presents with a chief complaint of 72F w/ asthma exacerbation (13 Sep 2017 03:30)      SUBJECTIVE / OVERNIGHT EVENTS: No acute events overnight. Pt states she feels slightly better than when she came but still feels like shes wheezing. No fevers, chills, resp distress. Complaining of constipation, she states she has a BM everyday but did not have one yesterday. +cough. ALso complaining of increased urination. No dysuria.       ROS  :   General             :  No fever , no chills  HEENT              :  No sore throat, no dyphasia , no bleeding gum   Respiratory       :  pos cough, no hemoptysis , improved SOB as per patient   Cardiovascular  :  No chest pain , no SOB, no palpitation, pos legs swelling    GI                    :   No abdominal pain , no diarrhea , no vomiting ,patient states to feel constipated                    :    pos increased urination, no dysuria  Musculoskeletal:  discomfort on the feet due to chronic leg edema  Neuro             : no change in mentation     MEDICATIONS  (STANDING):  ALBUTerol/ipratropium for Nebulization 3 milliLiter(s) Nebulizer every 6 hours  insulin lispro (HumaLOG) corrective regimen sliding scale   SubCutaneous three times a day before meals  insulin lispro (HumaLOG) corrective regimen sliding scale   SubCutaneous at bedtime  dextrose 5%. 1000 milliLiter(s) (50 mL/Hr) IV Continuous <Continuous>  dextrose 50% Injectable 12.5 Gram(s) IV Push once  insulin glargine Injectable (LANTUS) 8 Unit(s) SubCutaneous at bedtime  dextrose 50% Injectable 25 Gram(s) IV Push once  dextrose 50% Injectable 25 Gram(s) IV Push once  aspirin enteric coated 81 milliGRAM(s) Oral daily  enalapril 10 milliGRAM(s) Oral two times a day  loratadine 10 milliGRAM(s) Oral daily  atorvastatin 20 milliGRAM(s) Oral at bedtime  famotidine    Tablet 10 milliGRAM(s) Oral daily  montelukast 10 milliGRAM(s) Oral daily  buDESOnide  80 MICROgram(s)/formoterol 4.5 MICROgram(s) Inhaler 2 Puff(s) Inhalation two times a day  enoxaparin Injectable 40 milliGRAM(s) SubCutaneous every 24 hours  predniSONE   Tablet 50 milliGRAM(s) Oral daily  benzonatate 100 milliGRAM(s) Oral three times a day  docusate sodium 100 milliGRAM(s) Oral three times a day    MEDICATIONS  (PRN):  dextrose Gel 1 Dose(s) Oral once PRN Blood Glucose LESS THAN 70 milliGRAM(s)/deciliter  glucagon  Injectable 1 milliGRAM(s) IntraMuscular once PRN Glucose LESS THAN 70 milligrams/deciliter      I&O's Summary      Vital Signs Last 24 Hrs  T(C): 36.6 (14 Sep 2017 04:59), Max: 36.7 (13 Sep 2017 21:33)  T(F): 97.9 (14 Sep 2017 04:59), Max: 98 (13 Sep 2017 21:33)  HR: 70 (14 Sep 2017 06:32) (48 - 91)  BP: 131/81 (14 Sep 2017 04:59) (131/81 - 140/72)  BP(mean): --  RR: 18 (14 Sep 2017 04:59) (18 - 18)  SpO2: 96% (14 Sep 2017 06:32) (96% - 99%)  CAPILLARY BLOOD GLUCOSE  169 (13 Sep 2017 22:10)  244 (13 Sep 2017 17:47)  270 (13 Sep 2017 12:31)  213 (13 Sep 2017 08:26)          PHYSICAL EXAM:  GENERAL: NAD, well-developed  HEAD:  Atraumatic, Normocephalic  EYES: EOMI, PERRLA, conjunctiva and sclera clear  NECK: Supple, No JVD  CHEST/LUNG: diffuse expiratory wheezing.   HEART: Regular rate and rhythm; No murmurs, rubs, or gallops, no JVD  ABDOMEN: Soft, Nontender, Nondistended; Bowel sounds present  EXTREMITIES:  2+ Peripheral Pulses, No clubbing, cyanosis, pos b/l leg 2+ edema  PSYCH: AAOx3  NEUROLOGY: non-focal  SKIN: No rashes or lesions    LABS:                        12.9   9.8   )-----------( 111      ( 12 Sep 2017 15:18 )             38.7     Auto Eosinophil # 0.2   / Auto Eosinophil % 2.3   / Auto Neutrophil # 6.4   / Auto Neutrophil % 65.2  / BANDS % x        09-12    143  |  103  |  15  ----------------------------<  107<H>  4.9   |  24  |  0.97    Ca    9.2      12 Sep 2017 15:18  TPro  6.9  /  Alb  3.7  /  TBili  0.6  /  DBili  x   /  AST  21  /  ALT  19  /  AlkPhos  98  09-12          Urinalysis Basic - ( 12 Sep 2017 15:18 )    Color: x / Appearance: Clear / S.011 / pH: x  Gluc: x / Ketone: Negative  / Bili: Negative / Urobili: Negative   Blood: x / Protein: Negative / Nitrite: Negative   Leuk Esterase: Large / RBC: 0-2 /HPF / WBC 25-50 /HPF   Sq Epi: x / Non Sq Epi: x / Bacteria: x             RADIOLOGY & ADDITIONAL TESTS:    Imaging Personally Reviewed:    Care Discussed with Consultants/Other Providers:

## 2017-09-14 NOTE — PROGRESS NOTE ADULT - PROBLEM SELECTOR PLAN 4
Last BM 2 days ago.   -started on colace overnight  -will start senna and miralax prn hgba1c 7.9, , 169 adequately controlled  -c/w lantus 8 qhs and PASTORA  -check FS TID and qhs    -cw PASTORA and check FS TID and qhs.

## 2017-09-14 NOTE — PROGRESS NOTE ADULT - PROBLEM SELECTOR PLAN 8
lovenox for dvt  Note Jehovas witness and therefore refusing blood products, documented in chart signed by patient c/w statin

## 2017-09-14 NOTE — PROGRESS NOTE ADULT - PROBLEM SELECTOR PLAN 3
hgba1c 7.9, , 169 adequately controlled  -c/w lantus 8 qhs and PASTORA  -check FS TID and qhs    -cw PASTORA and check FS TID and qhs. Pt noted to have NICM w/ HFrEF s/p AICD w/ TTE documenting improvement in function w/ diastolic HF1 as well  - c/w asa, atorvastatin, enalapril  -does not take metoprolol bc of asthma.

## 2017-09-15 ENCOUNTER — TRANSCRIPTION ENCOUNTER (OUTPATIENT)
Age: 72
End: 2017-09-15

## 2017-09-15 DIAGNOSIS — R94.6 ABNORMAL RESULTS OF THYROID FUNCTION STUDIES: ICD-10-CM

## 2017-09-15 DIAGNOSIS — N39.0 URINARY TRACT INFECTION, SITE NOT SPECIFIED: ICD-10-CM

## 2017-09-15 LAB
CULTURE RESULTS: SIGNIFICANT CHANGE UP
SPECIMEN SOURCE: SIGNIFICANT CHANGE UP
T4 FREE SERPL-MCNC: 1.2 NG/DL — SIGNIFICANT CHANGE UP (ref 0.9–1.8)

## 2017-09-15 PROCEDURE — 99233 SBSQ HOSP IP/OBS HIGH 50: CPT | Mod: GC

## 2017-09-15 RX ORDER — INSULIN LISPRO 100/ML
3 VIAL (ML) SUBCUTANEOUS
Qty: 0 | Refills: 0 | Status: DISCONTINUED | OUTPATIENT
Start: 2017-09-15 | End: 2017-09-17

## 2017-09-15 RX ORDER — FLUTICASONE PROPIONATE 50 MCG
1 SPRAY, SUSPENSION NASAL
Qty: 0 | Refills: 0 | Status: DISCONTINUED | OUTPATIENT
Start: 2017-09-15 | End: 2017-09-21

## 2017-09-15 RX ADMIN — BUDESONIDE AND FORMOTEROL FUMARATE DIHYDRATE 2 PUFF(S): 160; 4.5 AEROSOL RESPIRATORY (INHALATION) at 17:13

## 2017-09-15 RX ADMIN — Medication 100 MILLIGRAM(S): at 14:46

## 2017-09-15 RX ADMIN — Medication 1 SPRAY(S): at 22:49

## 2017-09-15 RX ADMIN — Medication 100 MILLIGRAM(S): at 05:20

## 2017-09-15 RX ADMIN — Medication 1 SPRAY(S): at 05:19

## 2017-09-15 RX ADMIN — ENOXAPARIN SODIUM 40 MILLIGRAM(S): 100 INJECTION SUBCUTANEOUS at 05:19

## 2017-09-15 RX ADMIN — BUDESONIDE AND FORMOTEROL FUMARATE DIHYDRATE 2 PUFF(S): 160; 4.5 AEROSOL RESPIRATORY (INHALATION) at 06:17

## 2017-09-15 RX ADMIN — Medication 100 MILLIGRAM(S): at 22:47

## 2017-09-15 RX ADMIN — Medication: at 12:45

## 2017-09-15 RX ADMIN — Medication 3 MILLILITER(S): at 17:13

## 2017-09-15 RX ADMIN — SENNA PLUS 2 TABLET(S): 8.6 TABLET ORAL at 22:47

## 2017-09-15 RX ADMIN — Medication 50 MILLIGRAM(S): at 05:19

## 2017-09-15 RX ADMIN — Medication 1 SPRAY(S): at 18:40

## 2017-09-15 RX ADMIN — INSULIN GLARGINE 8 UNIT(S): 100 INJECTION, SOLUTION SUBCUTANEOUS at 22:48

## 2017-09-15 RX ADMIN — Medication 81 MILLIGRAM(S): at 12:09

## 2017-09-15 RX ADMIN — ATORVASTATIN CALCIUM 20 MILLIGRAM(S): 80 TABLET, FILM COATED ORAL at 22:47

## 2017-09-15 RX ADMIN — Medication 10 MILLIGRAM(S): at 05:20

## 2017-09-15 RX ADMIN — FAMOTIDINE 10 MILLIGRAM(S): 10 INJECTION INTRAVENOUS at 12:10

## 2017-09-15 RX ADMIN — Medication 1 SPRAY(S): at 14:46

## 2017-09-15 RX ADMIN — Medication 3 MILLILITER(S): at 06:18

## 2017-09-15 RX ADMIN — CEFTRIAXONE 100 GRAM(S): 500 INJECTION, POWDER, FOR SOLUTION INTRAMUSCULAR; INTRAVENOUS at 16:52

## 2017-09-15 RX ADMIN — MONTELUKAST 10 MILLIGRAM(S): 4 TABLET, CHEWABLE ORAL at 12:11

## 2017-09-15 RX ADMIN — Medication 3 MILLILITER(S): at 11:57

## 2017-09-15 RX ADMIN — Medication 3 MILLILITER(S): at 00:27

## 2017-09-15 RX ADMIN — Medication 100 MILLIGRAM(S): at 05:19

## 2017-09-15 RX ADMIN — Medication 1: at 09:46

## 2017-09-15 RX ADMIN — LORATADINE 10 MILLIGRAM(S): 10 TABLET ORAL at 12:10

## 2017-09-15 RX ADMIN — Medication 3 UNIT(S): at 18:46

## 2017-09-15 RX ADMIN — Medication 1: at 18:42

## 2017-09-15 RX ADMIN — Medication 10 MILLIGRAM(S): at 18:41

## 2017-09-15 NOTE — PROGRESS NOTE ADULT - PROBLEM SELECTOR PLAN 5
Last BM 2 days ago.   -cw senna, colace, miralax. Pt noted to have NICM w/ HFrEF s/p AICD w/ TTE documenting improvement in function w/ diastolic HF1 as well. Pt thinks her legs are mildly swollen but does not want to take her home dose of lasix because she already has increased urination.   - c/w asa, atorvastatin, enalapril  -does not take metoprolol bc of asthma. Pt noted to have NICM w/ HFrEF s/p AICD and diastolic dysfunction. Pt thinks her legs are mildly swollen but does not want to take her home dose of lasix because she already has increased urination.   - c/w asa, atorvastatin, enalapril  -lasix prn if legs continue to be swollen  -does not take metoprolol bc of asthma.

## 2017-09-15 NOTE — PROGRESS NOTE ADULT - PROBLEM SELECTOR PLAN 3
hgba1c 7.9, FS elevated. Likely 2/2 steroids. Also glucose in urine.   -will increase lantus and consider adding humalog.   -cw PASTORA and check FS TID and qhs.

## 2017-09-15 NOTE — DISCHARGE NOTE ADULT - CARE PLAN
Principal Discharge DX:	Asthma exacerbation  Goal:	to control symptoms  Instructions for follow-up, activity and diet:	Please take your nebulizers, Dulera, singulair, and loratadine. FOllow up with Dr. Daily  Secondary Diagnosis:	UTI (urinary tract infection)  Secondary Diagnosis:	Diabetes Principal Discharge DX:	Asthma exacerbation  Goal:	to control symptoms  Instructions for follow-up, activity and diet:	Please take your nebulizers, Dulera, singulair, and loratadine. FOllow up with Dr. Daily  Follow up with ENT to evaluate your vocal cords as a cause of your wheezing.  Secondary Diagnosis:	UTI (urinary tract infection)  Instructions for follow-up, activity and diet:	YOu were treated with 3 days off ceftriaxone.  Secondary Diagnosis:	Diabetes  Instructions for follow-up, activity and diet:	Please continue to take lantus 8 units at bedtime. Your sugars were elevated from steroids. If they remain elevated when you are off steroids, please follow up with your PMD to increase your insulin dose Principal Discharge DX:	Asthma exacerbation  Goal:	to control symptoms  Instructions for follow-up, activity and diet:	Please take your nebulizers, Dulera, singulair, and loratadine. FOllow up with Dr. Daily. Follow up with ENT to evaluate your vocal cords as a cause of your wheezing:  Dr. Mukund Lawrence on Monday 9/25/17 at 2:15pm.  Secondary Diagnosis:	UTI (urinary tract infection)  Instructions for follow-up, activity and diet:	YOu were treated with 3 days of ceftriaxone and your symptoms of polyuria resolved. Please follow up with your PCP.  Secondary Diagnosis:	Diabetes  Instructions for follow-up, activity and diet:	Please continue to take lantus 8 units at bedtime. Your sugars were elevated from steroids. If they remain elevated when you are off steroids, please follow up with your PMD to increase your insulin dose

## 2017-09-15 NOTE — DISCHARGE NOTE ADULT - CARE PROVIDERS DIRECT ADDRESSES
,[email or web address]

## 2017-09-15 NOTE — DISCHARGE NOTE ADULT - HOSPITAL COURSE
72F w/ asthma (childhood onset, intubatedx1 2016), NICM w/ HFrEF s/p AICD, DM2 on insulin, HTN, HLD presents to [facility] for SOB/wheezing likely 2/2 asthma vs. COPD exacerbation 2/2 URI vs. allergy.     Pt was started on prednisone 50    During hospital admission, pt had a UTI treated with ceftriaxone. 72F w/ asthma (childhood onset, intubatedx1 2016), NICM w/ HFrEF s/p AICD, DM2 on insulin, HTN, HLD presents to [facility] for SOB/wheezing likely 2/2 asthma exacerbation. Cxray clear and RVP negative. Pt was started on prednisone 50 and tapered to 40 and then 20 mg on 9/21/17. However, despite steroids, pt had persistent wheezing on lung exam Otherwise, pt had no episodes of hypoxia or hypercapnia.     Pulm and cardiology was consulted, Cardiology did not believe patient was in heart failure. Pulmonology felt pt's wheezing may be upper respiratory/or due to vocal cord dysfunction due to previous intubation.     During hospital admission, pt also had a UTI treated with ceftriaxone. She also had elevated sugars likely due to steroids. Pt was started on humalog premeal. However, this is likely from steroids, and as patient as being tapered off steroids, pt is not discharged on premeal insulin. SHe is advised to continue with lantus 8 units at bedtime and follow up her PCP Dr. Neena Comer.     She is also recommended to follow up with Dr. Kirill Stiles, pt's pulmonologist and ENT to better evaluate her vocal cords.     Pt will continue her prednisone taper (take 20 mg for 2 days, and 10 mg for 3 days, and 5 mg for 3 days.) 72F w/ asthma (childhood onset, intubatedx1 2016), NICM w/ HFrEF s/p AICD, DM2 on insulin, HTN, HLD presents to [facility] for SOB/wheezing likely 2/2 asthma exacerbation. Cxray clear and RVP negative. Pt was started on prednisone 50 and tapered to 40 and then 20 mg on 9/21/17. However, despite steroids, pt had persistent wheezing on lung exam Otherwise, pt had no episodes of hypoxia or hypercapnia.     Pulm and cardiology was consulted, Cardiology did not believe patient was in heart failure. She was euvolemic, BNP was low, and cxray was clear. Pulmonology felt pt's wheezing may be upper respiratory/or due to vocal cord dysfunction due to previous intubation.     During hospital admission, pt also had a UTI treated with ceftriaxone. She also had elevated sugars likely due to steroids. Pt was started on humalog premeal. However, this is likely from steroids, and as patient as being tapered off steroids, pt is not discharged on premeal insulin. SHe is advised to continue with lantus 8 units at bedtime and follow up her PCP Dr. Neena Comer.     She is also recommended to follow up with Dr. Kirill Daily, pt's pulmonologist and ENT to better evaluate her vocal cords.     Pt will continue her prednisone taper (take 20 mg for 2 days, and 10 mg for 3 days, and 5 mg for 3 days.) 72F w/ asthma (childhood onset, intubatedx1 2016), NICM w/ HFrEF s/p AICD, DM2 on insulin, HTN, HLD presents to [facility] for SOB/wheezing likely 2/2 asthma exacerbation. Cxray clear and RVP negative. Pt was started on prednisone 50 and tapered to 40 and then 20 mg on 9/21/17. However, despite steroids, pt had persistent wheezing on lung exam Otherwise, pt had no episodes of hypoxia or hypercapnia.     Pulm and cardiology was consulted, Cardiology did not believe patient was in heart failure. She was euvolemic, BNP was low, and cxray was clear. Pulmonology felt pt's wheezing may be upper respiratory/or due to vocal cord dysfunction due to previous intubation.     During hospital admission, pt also had a UTI treated with ceftriaxone. She also had elevated sugars likely due to steroids. Pt was started on humalog premeal. However, this is likely from steroids, and as patient as being tapered off steroids, pt is not discharged on premeal insulin. SHe is advised to continue with lantus 8 units at bedtime and follow up her PCP Dr. Neena Comer.     She is also recommended to follow up with Dr. Kirill Daily, pt's pulmonologist and ENT to better evaluate her vocal cords.     Pt will continue her prednisone taper (take 20 mg for 2 days, and 10 mg for 3 days, and 5 mg for 3 days.)     Appointment for ENT: Dr. Mukund Lawrence on Monday 9/25/17 at 2:15pm. 72F w/ asthma (childhood onset, intubatedx1 2016), NICM w/ HFrEF s/p AICD, DM2 on insulin, HTN, HLD presents to [facility] for SOB/wheezing likely 2/2 asthma exacerbation. Cxray clear and RVP negative. Pt was started on prednisone 50 and tapered to 40 and then 20 mg on 9/21/17. However, despite steroids, pt had persistent wheezing on lung exam Otherwise, pt had no episodes of hypoxia or hypercapnia. Her daily peak flows were 150--which is patient's baseline.     Pulm and cardiology was consulted, Cardiology did not believe patient was in heart failure. She was euvolemic, BNP was low, and cxray was clear. She will follow up with Dr. Hobson regarding optimization of heart failure meds.     Pulmonology felt pt's wheezing may be upper respiratory/or due to vocal cord dysfunction due to previous intubation. She is also recommended to follow up with Dr. Kirill Daily, pt's pulmonologist and ENT to better evaluate her vocal cords.     During hospital admission, pt also had a UTI treated with ceftriaxone. She also had elevated sugars likely due to steroids. Pt was started on humalog premeal. However, this is likely from steroids, and as patient as being tapered off steroids, pt is not discharged on premeal insulin. SHe is advised to continue with lantus 8 units at bedtime and follow up her PCP Dr. Neena Comer.     Pt will continue her prednisone taper (take 20 mg for 2 days, and 10 mg for 3 days, and 5 mg for 3 days.)     Appointment for ENT: Dr. Mukund Lawrence on Monday 9/25/17 at 2:15pm.

## 2017-09-15 NOTE — PROGRESS NOTE ADULT - SUBJECTIVE AND OBJECTIVE BOX
Patient is a 72y old  Female who presents with a chief complaint of 72F w/ asthma exacerbation (13 Sep 2017 03:30)      SUBJECTIVE / OVERNIGHT EVENTS: No acute events overnight. Pt states she feels mildly better from admission. No fevers, chills, resp distress.  Still has increased urination but states  Complaining of constipation, she states she has a BM everyday but did not have one yesterday. +cough. ALso complaining of increased urination. No dysuria.       ROS  :   General             :  No fever , no chills  HEENT              :  No sore throat, no dyphasia , no bleeding gum   Respiratory       :  pos cough, no hemoptysis , improved SOB as per patient   Cardiovascular  :  No chest pain , no SOB, no palpitation, pos legs swelling    GI                    :   No abdominal pain , no diarrhea , no vomiting ,patient states to feel constipated                    :    pos increased urination, no dysuria  Musculoskeletal:  discomfort on the feet due to chronic leg edema  Neuro             : no change in mentation     MEDICATIONS  (STANDING):  ALBUTerol/ipratropium for Nebulization 3 milliLiter(s) Nebulizer every 6 hours  insulin lispro (HumaLOG) corrective regimen sliding scale   SubCutaneous three times a day before meals  insulin lispro (HumaLOG) corrective regimen sliding scale   SubCutaneous at bedtime  dextrose 5%. 1000 milliLiter(s) (50 mL/Hr) IV Continuous <Continuous>  dextrose 50% Injectable 12.5 Gram(s) IV Push once  insulin glargine Injectable (LANTUS) 8 Unit(s) SubCutaneous at bedtime  dextrose 50% Injectable 25 Gram(s) IV Push once  dextrose 50% Injectable 25 Gram(s) IV Push once  aspirin enteric coated 81 milliGRAM(s) Oral daily  enalapril 10 milliGRAM(s) Oral two times a day  loratadine 10 milliGRAM(s) Oral daily  atorvastatin 20 milliGRAM(s) Oral at bedtime  famotidine    Tablet 10 milliGRAM(s) Oral daily  montelukast 10 milliGRAM(s) Oral daily  buDESOnide  80 MICROgram(s)/formoterol 4.5 MICROgram(s) Inhaler 2 Puff(s) Inhalation two times a day  enoxaparin Injectable 40 milliGRAM(s) SubCutaneous every 24 hours  predniSONE   Tablet 50 milliGRAM(s) Oral daily  benzonatate 100 milliGRAM(s) Oral three times a day  docusate sodium 100 milliGRAM(s) Oral three times a day    MEDICATIONS  (PRN):  dextrose Gel 1 Dose(s) Oral once PRN Blood Glucose LESS THAN 70 milliGRAM(s)/deciliter  glucagon  Injectable 1 milliGRAM(s) IntraMuscular once PRN Glucose LESS THAN 70 milligrams/deciliter      I&O's Summary      Vital Signs Last 24 Hrs  T(C): 36.6 (14 Sep 2017 04:59), Max: 36.7 (13 Sep 2017 21:33)  T(F): 97.9 (14 Sep 2017 04:59), Max: 98 (13 Sep 2017 21:33)  HR: 70 (14 Sep 2017 06:32) (48 - 91)  BP: 131/81 (14 Sep 2017 04:59) (131/81 - 140/72)  BP(mean): --  RR: 18 (14 Sep 2017 04:59) (18 - 18)  SpO2: 96% (14 Sep 2017 06:32) (96% - 99%)  CAPILLARY BLOOD GLUCOSE  169 (13 Sep 2017 22:10)  244 (13 Sep 2017 17:47)  270 (13 Sep 2017 12:31)  213 (13 Sep 2017 08:26)          PHYSICAL EXAM:  GENERAL: NAD, well-developed  HEAD:  Atraumatic, Normocephalic  EYES: EOMI, PERRLA, conjunctiva and sclera clear  NECK: Supple, No JVD  CHEST/LUNG: diffuse expiratory wheezing.   HEART: Regular rate and rhythm; No murmurs, rubs, or gallops, no JVD  ABDOMEN: Soft, Nontender, Nondistended; Bowel sounds present  EXTREMITIES:  2+ Peripheral Pulses, No clubbing, cyanosis, pos b/l leg 2+ edema  PSYCH: AAOx3  NEUROLOGY: non-focal  SKIN: No rashes or lesions    LABS:                        12.9   9.8   )-----------( 111      ( 12 Sep 2017 15:18 )             38.7     Auto Eosinophil # 0.2   / Auto Eosinophil % 2.3   / Auto Neutrophil # 6.4   / Auto Neutrophil % 65.2  / BANDS % x        09-12    143  |  103  |  15  ----------------------------<  107<H>  4.9   |  24  |  0.97    Ca    9.2      12 Sep 2017 15:18  TPro  6.9  /  Alb  3.7  /  TBili  0.6  /  DBili  x   /  AST  21  /  ALT  19  /  AlkPhos  98  09-12          Urinalysis Basic - ( 12 Sep 2017 15:18 )    Color: x / Appearance: Clear / S.011 / pH: x  Gluc: x / Ketone: Negative  / Bili: Negative / Urobili: Negative   Blood: x / Protein: Negative / Nitrite: Negative   Leuk Esterase: Large / RBC: 0-2 /HPF / WBC 25-50 /HPF   Sq Epi: x / Non Sq Epi: x / Bacteria: x             RADIOLOGY & ADDITIONAL TESTS:    Imaging Personally Reviewed:    Care Discussed with Consultants/Other Providers: Patient is a 72y old  Female who presents with a chief complaint of 72F w/ asthma exacerbation (13 Sep 2017 03:30)      SUBJECTIVE / OVERNIGHT EVENTS: No acute events overnight. Pt states she feels mildly better from admission. No fevers, chills, resp distress.  Still has increased urination but states its "coming along." Pt thinks her legs are mildly swollen but does not want to take her home dose of lasix because she already has increased urination.      ROS  :   General             :  No fever , no chills  HEENT              :  No dyphagia , no bleeding gum , +hoarsness since admission.   Respiratory       :  pos cough, no hemoptysis , improved SOB as per patient   Cardiovascular  :  No chest pain , no SOB, no palpitation, pos legs swelling    GI                    :   No abdominal pain , no diarrhea , no vomiting ,patient states to feel constipated                    :    pos increased urination, no dysuria  Musculoskeletal:  discomfort on the feet due to chronic leg edema  Neuro             : no change in mentation     MEDICATIONS  (STANDING):  ALBUTerol/ipratropium for Nebulization 3 milliLiter(s) Nebulizer every 6 hours  insulin lispro (HumaLOG) corrective regimen sliding scale   SubCutaneous three times a day before meals  insulin lispro (HumaLOG) corrective regimen sliding scale   SubCutaneous at bedtime  dextrose 5%. 1000 milliLiter(s) (50 mL/Hr) IV Continuous <Continuous>  dextrose 50% Injectable 12.5 Gram(s) IV Push once  insulin glargine Injectable (LANTUS) 8 Unit(s) SubCutaneous at bedtime  dextrose 50% Injectable 25 Gram(s) IV Push once  dextrose 50% Injectable 25 Gram(s) IV Push once  aspirin enteric coated 81 milliGRAM(s) Oral daily  enalapril 10 milliGRAM(s) Oral two times a day  loratadine 10 milliGRAM(s) Oral daily  atorvastatin 20 milliGRAM(s) Oral at bedtime  famotidine    Tablet 10 milliGRAM(s) Oral daily  montelukast 10 milliGRAM(s) Oral daily  buDESOnide  80 MICROgram(s)/formoterol 4.5 MICROgram(s) Inhaler 2 Puff(s) Inhalation two times a day  enoxaparin Injectable 40 milliGRAM(s) SubCutaneous every 24 hours  predniSONE   Tablet 50 milliGRAM(s) Oral daily  benzonatate 100 milliGRAM(s) Oral three times a day  docusate sodium 100 milliGRAM(s) Oral three times a day    MEDICATIONS  (PRN):  dextrose Gel 1 Dose(s) Oral once PRN Blood Glucose LESS THAN 70 milliGRAM(s)/deciliter  glucagon  Injectable 1 milliGRAM(s) IntraMuscular once PRN Glucose LESS THAN 70 milligrams/deciliter      I&O's Summary      Vital Signs Last 24 Hrs  T(C): 36.6 (14 Sep 2017 04:59), Max: 36.7 (13 Sep 2017 21:33)  T(F): 97.9 (14 Sep 2017 04:59), Max: 98 (13 Sep 2017 21:33)  HR: 70 (14 Sep 2017 06:32) (48 - 91)  BP: 131/81 (14 Sep 2017 04:59) (131/81 - 140/72)  BP(mean): --  RR: 18 (14 Sep 2017 04:59) (18 - 18)  SpO2: 96% (14 Sep 2017 06:32) (96% - 99%)  CAPILLARY BLOOD GLUCOSE  169 (13 Sep 2017 22:10)  244 (13 Sep 2017 17:47)  270 (13 Sep 2017 12:31)  213 (13 Sep 2017 08:26)          PHYSICAL EXAM:  GENERAL: NAD, well-developed  HEAD:  Atraumatic, Normocephalic  EYES: EOMI, PERRLA, conjunctiva and sclera clear  NECK: Supple, No JVD  CHEST/LUNG: diffuse expiratory wheezing.   HEART: Regular rate and rhythm; No murmurs, rubs, or gallops, no JVD  ABDOMEN: Soft, Nontender, Nondistended; Bowel sounds present  EXTREMITIES:  2+ Peripheral Pulses, No clubbing, cyanosis, pos b/l leg 2+ edema  PSYCH: AAOx3  NEUROLOGY: non-focal  SKIN: No rashes or lesions    LABS:                        12.9   9.8   )-----------( 111      ( 12 Sep 2017 15:18 )             38.7     Auto Eosinophil # 0.2   / Auto Eosinophil % 2.3   / Auto Neutrophil # 6.4   / Auto Neutrophil % 65.2  / BANDS % x        09-12    143  |  103  |  15  ----------------------------<  107<H>  4.9   |  24  |  0.97    Ca    9.2      12 Sep 2017 15:18  TPro  6.9  /  Alb  3.7  /  TBili  0.6  /  DBili  x   /  AST  21  /  ALT  19  /  AlkPhos  98  09-12          Urinalysis Basic - ( 12 Sep 2017 15:18 )    Color: x / Appearance: Clear / S.011 / pH: x  Gluc: x / Ketone: Negative  / Bili: Negative / Urobili: Negative   Blood: x / Protein: Negative / Nitrite: Negative   Leuk Esterase: Large / RBC: 0-2 /HPF / WBC 25-50 /HPF   Sq Epi: x / Non Sq Epi: x / Bacteria: x             RADIOLOGY & ADDITIONAL TESTS:    Imaging Personally Reviewed:    Care Discussed with Consultants/Other Providers:

## 2017-09-15 NOTE — PROGRESS NOTE ADULT - ASSESSMENT
72F w/ asthma (childhood onset, intubatedx1 2016), NICM w/ HFrEF s/p AICD, DM2 on insulin, HTN, HLD presents to [facility] for SOB/wheezing likely 2/2 asthma vs. COPD exacerbation 2/2 URI vs. allergy.

## 2017-09-15 NOTE — DISCHARGE NOTE ADULT - CARE PROVIDER_API CALL
Kirill Daily), Internal Medicine; Pulmonary Disease  [facility]  [id number] [place] [address]  Phone: [phone]  Fax: [phone]    Neena Comer), Internal Medicine  [address] [place] [address]  Phone: [phone]  Fax: [phone] Kirill Daily), Internal Medicine; Pulmonary Disease  [facility]  [address] [address] [place] [address]  Phone: [phone]  Fax: [phone]    Neena Comer), Internal Medicine  [address] [place] [address]  Phone: [phone]  Fax: [phone]    Balaji Hobson (MD; MPH), [facility] Cardio; Cardiology; Internal Medicine  [address] [place] [address]  Phone: [phone]  Fax: [phone] Kirill Daily), Internal Medicine; Pulmonary Disease  [facility]  [address] [address] [place] [address]  Phone: [phone]  Fax: [phone]    Neena Comer), Internal Medicine  [address] [place] [address]  Phone: [phone]  Fax: [phone]    Balaji Hobson (MD; MPH), [facility] Cardio; Cardiology; Internal Medicine  [address] [place] [address]  Phone: [phone]  Fax: [phone]    Mukund Lawrence), Otolaryngology  [address] [place] [address]  Phone: [phone]  Fax: [phone]

## 2017-09-15 NOTE — DISCHARGE NOTE ADULT - PROVIDER TOKENS
TOKALVARADO:'[id number]',TOKALVARADO:'[id number]' TOKEN:'[id number]',TOKEN:'[id number]',TOKEN:'[id number]' TOKEN:'[id number]',[id number]'

## 2017-09-15 NOTE — DISCHARGE NOTE ADULT - PLAN OF CARE
to control symptoms Please take your nebulizers, Dulera, singulair, and loratadine. FOllow up with Dr. Daily Please take your nebulizers, Dulera, singulair, and loratadine. FOllow up with Dr. Daily  Follow up with ENT to evaluate your vocal cords as a cause of your wheezing. YOu were treated with 3 days off ceftriaxone. Please continue to take lantus 8 units at bedtime. Your sugars were elevated from steroids. If they remain elevated when you are off steroids, please follow up with your PMD to increase your insulin dose Please take your nebulizers, Dulera, singulair, and loratadine. FOllow up with Dr. Daily. Follow up with ENT to evaluate your vocal cords as a cause of your wheezing:  Dr. Mukund Lawrence on Monday 9/25/17 at 2:15pm. YOu were treated with 3 days of ceftriaxone and your symptoms of polyuria resolved. Please follow up with your PCP.

## 2017-09-15 NOTE — PROGRESS NOTE ADULT - PROBLEM SELECTOR PLAN 2
+Urinalysis positive with many bacteria  -f/up urine culture.   cw ceftriaxone Day 2 and deescalate pending urine culture. +Urinalysis positive with many bacteria and pts with symptoms of polyuria. No dysuria.   -f/up urine culture.   cw ceftriaxone Day 2 and deescalate pending urine culture.

## 2017-09-15 NOTE — PROGRESS NOTE ADULT - PROBLEM SELECTOR PLAN 6
-will monitor creatinine and electrolytes. Last BM 2 days ago.   -cw senna, colace, miralax. Last BM 2 days ago.   -cw senna, colace, miralax.  will start Dulcolax suppository once a day for 2 days / PRN

## 2017-09-15 NOTE — DISCHARGE NOTE ADULT - MEDICATION SUMMARY - MEDICATIONS TO TAKE
I will START or STAY ON the medications listed below when I get home from the hospital:    predniSONE 5 mg oral tablet  -- 4 tabs by mouth once a day x 2 days 9/22 and 9/23  2 tabs by mouth once a day x 3 days 9/24-9/26  1 tab by mouth once a day x 3 days on 9/27-9/29  -- Indication: For Asthma exacerbation    aspirin 81 mg oral delayed release tablet  -- 1 tab(s) by mouth once a day  -- Indication: For Heart failure    enalapril 10 mg oral tablet  -- 1 tab(s) by mouth 2 times a day  -- Indication: For Chronic combined systolic and diastolic heart failure    Lantus 100 units/mL subcutaneous solution  -- 8 unit(s) subcutaneous once a day  -- Indication: For Diabetes    loratadine 10 mg oral tablet  -- 1 tab(s) by mouth once a day  -- Indication: For Asthma exacerbation    atorvastatin 20 mg oral tablet  -- 1 tab(s) by mouth once a day (at bedtime)  -- Indication: For Diabetes    Ventolin HFA 90 mcg/inh inhalation aerosol  -- 2 puff(s) inhaled 4 times a day, As Needed  -- Indication: For Asthma exacerbation    Dulera 100 mcg-5 mcg/inh inhalation aerosol  -- 2 puff(s) inhaled 2 times a day  -- Indication: For Asthma exacerbation    albuterol-ipratropium 2.5 mg-0.5 mg/3 mL inhalation solution  -- 3 milliliter(s) inhaled every 6 hours  -- Indication: For Asthma exacerbation    furosemide 40 mg oral tablet  -- 1 tab(s) by mouth once a day, As Needed for leg swelling  -- Indication: For Chronic combined systolic and diastolic heart failure    famotidine 10 mg oral tablet  -- 1 tab(s) by mouth once  -- Indication: For GERD    montelukast 10 mg oral tablet  -- 1 tab(s) by mouth once a day  -- Indication: For Asthma exacerbation    magnesium oxide 500 mg oral tablet  -- 1 tab(s) by mouth 3 times a day, As Needed  -- Indication: For Chronic combined systolic and diastolic heart failure    potassium chloride 20 mEq oral powder for reconstitution  -- 1 each by mouth once a day, As Needed, with lasix  -- Indication: For Chronic combined systolic and diastolic heart failure    TheraTears ophthalmic solution  -- 1 drop(s) in each eye once a day  -- Indication: For Eye care

## 2017-09-15 NOTE — PROGRESS NOTE ADULT - PROBLEM SELECTOR PLAN 4
Pt noted to have NICM w/ HFrEF s/p AICD w/ TTE documenting improvement in function w/ diastolic HF1 as well. Pt thinks her legs are mildly swollen but does not want to take her home dose of lasix because she already has increased urination.   - c/w asa, atorvastatin, enalapril  -does not take metoprolol bc of asthma. Pt's with no symptoms or signs of hyperthyroidism. TSH was low in may 2016 also  -will check Free T4, T3 Pt's with no symptoms or signs of hyperthyroidism. TSH was low in may 2016 also. May be 2/2 steroids.   -will check Free T4, T3

## 2017-09-15 NOTE — PROGRESS NOTE ADULT - PROBLEM SELECTOR PLAN 1
Asthma vs COPD exacerbation. Pt feels better, still with diffuse wheezing on exam however-improving.   - c/w duoneb around the clock, symbicort in hospital, prednisone 50mg qdaily, singulair  -will likely need slow steroid taper. Asthma vs COPD exacerbation. Pt feels better, still with diffuse wheezing on exam however-improving.   - c/w duoneb around the clock, symbicort in hospital, prednisone 50mg qdaily, singulair  -will likely need slow steroid taper.  Flonase for post nasal drip

## 2017-09-16 LAB — NT-PROBNP SERPL-SCNC: 311 PG/ML — HIGH (ref 0–300)

## 2017-09-16 PROCEDURE — 99233 SBSQ HOSP IP/OBS HIGH 50: CPT | Mod: GC

## 2017-09-16 RX ORDER — LACTULOSE 10 G/15ML
10 SOLUTION ORAL ONCE
Qty: 0 | Refills: 0 | Status: COMPLETED | OUTPATIENT
Start: 2017-09-16 | End: 2017-09-16

## 2017-09-16 RX ADMIN — FAMOTIDINE 10 MILLIGRAM(S): 10 INJECTION INTRAVENOUS at 11:06

## 2017-09-16 RX ADMIN — Medication 3 MILLILITER(S): at 06:15

## 2017-09-16 RX ADMIN — Medication 3 MILLILITER(S): at 00:23

## 2017-09-16 RX ADMIN — Medication 3 MILLILITER(S): at 17:25

## 2017-09-16 RX ADMIN — Medication 10 MILLIGRAM(S): at 18:21

## 2017-09-16 RX ADMIN — BUDESONIDE AND FORMOTEROL FUMARATE DIHYDRATE 2 PUFF(S): 160; 4.5 AEROSOL RESPIRATORY (INHALATION) at 06:15

## 2017-09-16 RX ADMIN — LACTULOSE 10 GRAM(S): 10 SOLUTION ORAL at 11:02

## 2017-09-16 RX ADMIN — BUDESONIDE AND FORMOTEROL FUMARATE DIHYDRATE 2 PUFF(S): 160; 4.5 AEROSOL RESPIRATORY (INHALATION) at 17:25

## 2017-09-16 RX ADMIN — Medication 1 SPRAY(S): at 06:52

## 2017-09-16 RX ADMIN — Medication 100 MILLIGRAM(S): at 06:52

## 2017-09-16 RX ADMIN — Medication 100 MILLIGRAM(S): at 13:47

## 2017-09-16 RX ADMIN — CEFTRIAXONE 100 GRAM(S): 500 INJECTION, POWDER, FOR SOLUTION INTRAMUSCULAR; INTRAVENOUS at 18:25

## 2017-09-16 RX ADMIN — Medication 10 MILLIGRAM(S): at 07:45

## 2017-09-16 RX ADMIN — Medication 3 UNIT(S): at 09:29

## 2017-09-16 RX ADMIN — Medication 1 SPRAY(S): at 21:13

## 2017-09-16 RX ADMIN — Medication 3: at 18:22

## 2017-09-16 RX ADMIN — ATORVASTATIN CALCIUM 20 MILLIGRAM(S): 80 TABLET, FILM COATED ORAL at 21:12

## 2017-09-16 RX ADMIN — SENNA PLUS 2 TABLET(S): 8.6 TABLET ORAL at 21:12

## 2017-09-16 RX ADMIN — Medication 3 UNIT(S): at 18:23

## 2017-09-16 RX ADMIN — Medication 3 MILLILITER(S): at 23:43

## 2017-09-16 RX ADMIN — Medication 3 UNIT(S): at 13:46

## 2017-09-16 RX ADMIN — Medication 3 MILLILITER(S): at 11:10

## 2017-09-16 RX ADMIN — Medication 1: at 09:28

## 2017-09-16 RX ADMIN — Medication 100 MILLIGRAM(S): at 21:12

## 2017-09-16 RX ADMIN — Medication 81 MILLIGRAM(S): at 11:05

## 2017-09-16 RX ADMIN — Medication 200 MILLIGRAM(S): at 18:16

## 2017-09-16 RX ADMIN — Medication 3: at 13:44

## 2017-09-16 RX ADMIN — Medication 1 SPRAY(S): at 13:45

## 2017-09-16 RX ADMIN — Medication 40 MILLIGRAM(S): at 13:46

## 2017-09-16 RX ADMIN — Medication 1 SPRAY(S): at 06:53

## 2017-09-16 RX ADMIN — Medication 1 SPRAY(S): at 18:20

## 2017-09-16 RX ADMIN — Medication 50 MILLIGRAM(S): at 06:52

## 2017-09-16 RX ADMIN — LORATADINE 10 MILLIGRAM(S): 10 TABLET ORAL at 11:05

## 2017-09-16 RX ADMIN — ENOXAPARIN SODIUM 40 MILLIGRAM(S): 100 INJECTION SUBCUTANEOUS at 06:52

## 2017-09-16 RX ADMIN — MONTELUKAST 10 MILLIGRAM(S): 4 TABLET, CHEWABLE ORAL at 11:04

## 2017-09-16 RX ADMIN — INSULIN GLARGINE 8 UNIT(S): 100 INJECTION, SOLUTION SUBCUTANEOUS at 22:23

## 2017-09-16 NOTE — PROGRESS NOTE ADULT - PROBLEM SELECTOR PLAN 1
Asthma vs COPD exacerbation. Pt feels better, still with diffuse wheezing on exam however-improving.   - c/w duoneb around the clock, symbicort in hospital, prednisone 50mg qdaily, singulair  -will likely need slow steroid taper -> decrease to 40mg today  - Flonase for post nasal drip  - peak flow Asthma vs COPD exacerbation. Pt feels better, still with diffuse wheezing on exam however-improving.   - c/w duoneb around the clock, symbicort in hospital, prednisone 50mg qdaily, singulair  -will likely need slow steroid taper -> decrease to 40mg today  - Flonase for post nasal drip  - peak flow  Patient refuses IV steroid due to report of hallucinations

## 2017-09-16 NOTE — PROGRESS NOTE ADULT - PROBLEM SELECTOR PLAN 4
Pt's with no symptoms or signs of hyperthyroidism. TSH was low in may 2016 also. May be 2/2 steroids.   -will check Free T4, T3

## 2017-09-16 NOTE — PROGRESS NOTE ADULT - ASSESSMENT
72F w/ asthma (childhood onset, intubatedx1 2016), NICM w/ HFrEF s/p AICD, DM2 on insulin, HTN, HLD presents to [facility] for SOB/wheezing likely 2/2 asthma vs. COPD exacerbation 2/2 URI vs. allergy.

## 2017-09-16 NOTE — PROGRESS NOTE ADULT - SUBJECTIVE AND OBJECTIVE BOX
Patient is a 72y old  Female who presents with a chief complaint of 72F w/ asthma exacerbation (15 Sep 2017 10:00)    SUBJECTIVE / OVERNIGHT EVENTS:  She says she feels well. She did not have the peak flow so she was unable to try, but says she usually runs from 200-250. She has not had any visual hallucinations with the steroids she is on. She is still having polyuria. She complains of not having a BM since her admission.    MEDICATIONS  (STANDING):  ALBUTerol/ipratropium for Nebulization 3 milliLiter(s) Nebulizer every 6 hours  insulin lispro (HumaLOG) corrective regimen sliding scale   SubCutaneous three times a day before meals  insulin lispro (HumaLOG) corrective regimen sliding scale   SubCutaneous at bedtime  dextrose 5%. 1000 milliLiter(s) (50 mL/Hr) IV Continuous <Continuous>  dextrose 50% Injectable 12.5 Gram(s) IV Push once  insulin glargine Injectable (LANTUS) 8 Unit(s) SubCutaneous at bedtime  dextrose 50% Injectable 25 Gram(s) IV Push once  dextrose 50% Injectable 25 Gram(s) IV Push once  aspirin enteric coated 81 milliGRAM(s) Oral daily  enalapril 10 milliGRAM(s) Oral two times a day  loratadine 10 milliGRAM(s) Oral daily  atorvastatin 20 milliGRAM(s) Oral at bedtime  famotidine    Tablet 10 milliGRAM(s) Oral daily  montelukast 10 milliGRAM(s) Oral daily  buDESOnide  80 MICROgram(s)/formoterol 4.5 MICROgram(s) Inhaler 2 Puff(s) Inhalation two times a day  enoxaparin Injectable 40 milliGRAM(s) SubCutaneous every 24 hours  predniSONE   Tablet 50 milliGRAM(s) Oral daily  docusate sodium 100 milliGRAM(s) Oral three times a day  senna 2 Tablet(s) Oral at bedtime  sodium chloride 0.65% Nasal 1 Spray(s) Both Nostrils three times a day  cefTRIAXone   IVPB 1 Gram(s) IV Intermittent every 24 hours  fluticasone propionate 50 MICROgram(s)/spray Nasal Spray 1 Spray(s) Both Nostrils two times a day  insulin lispro Injectable (HumaLOG) 3 Unit(s) SubCutaneous three times a day before meals    MEDICATIONS  (PRN):  dextrose Gel 1 Dose(s) Oral once PRN Blood Glucose LESS THAN 70 milliGRAM(s)/deciliter  glucagon  Injectable 1 milliGRAM(s) IntraMuscular once PRN Glucose LESS THAN 70 milligrams/deciliter  polyethylene glycol 3350 17 Gram(s) Oral daily PRN Constipation  bisacodyl Suppository 10 milliGRAM(s) Rectal daily PRN Constipation    Vital Signs Last 24 Hrs  T(C): 36.8 (16 Sep 2017 05:54), Max: 36.9 (15 Sep 2017 12:46)  T(F): 98.3 (16 Sep 2017 05:54), Max: 98.4 (15 Sep 2017 12:46)  HR: 86 (16 Sep 2017 05:54) (74 - 86)  BP: 144/87 (16 Sep 2017 05:54) (143/78 - 151/79)  RR: 18 (16 Sep 2017 05:54) (16 - 18)  SpO2: 97% (16 Sep 2017 05:54) (96% - 98%)    CAPILLARY BLOOD GLUCOSE  174 (15 Sep 2017 22:19)  184 (15 Sep 2017 18:24)  381 (15 Sep 2017 12:16)    General:  No fever , no chills  HEENT:  No dyphagia , no bleeding gum , +hoarseness since admission.   Respiratory:  pos cough, no hemoptysis , improved SOB as per patient   Cardiovascular:  No chest pain , no SOB, no palpitation, pos legs swelling    G :   No abdominal pain , no diarrhea , no vomiting   :  increased urination, no dysuria  Musculoskeletal: discomfort on the feet due to chronic leg edema  Neuro: no change in mentation     PHYSICAL EXAM:  GENERAL: NAD, well-developed  HEAD:  Atraumatic, Normocephalic  EYES: EOMI, PERRLA, conjunctiva and sclera clear  NECK: Supple, No JVD  CHEST/LUNG: very infrequent wheezing   HEART: Regular rate and rhythm; No murmurs, rubs, or gallops, no JVD  ABDOMEN: Soft, Nontender, Nondistended; Bowel sounds present  EXTREMITIES:  2+ Peripheral Pulses, No clubbing, cyanosis, pos b/l leg 2+ edema  PSYCH: AAOx3  NEUROLOGY: non-focal  SKIN: No rashes or lesions    LABS:                        12.5   11.0  )-----------( 112      ( 14 Sep 2017 11:58 )             38.1     Auto Eosinophil # x     / Auto Eosinophil % x     / Auto Neutrophil # x     / Auto Neutrophil % x     / BANDS % x        -14    140  |  102  |  23  ----------------------------<  276<H>  4.7   |  25  |  0.83    Ca    9.5      14 Sep 2017 11:58  Urinalysis Basic - ( 14 Sep 2017 14:33 )    Color: x / Appearance: Clear / S.016 / pH: x  Gluc: x / Ketone: Negative  / Bili: Negative / Urobili: Negative   Blood: x / Protein: Trace / Nitrite: Negative   Leuk Esterase: Large / RBC: 0-2 /HPF / WBC 10-25 /HPF   Sq Epi: x / Non Sq Epi: x / Bacteria: Few /HPF Patient is a 72y old  Female who presents with a chief complaint of 72F w/ asthma exacerbation (15 Sep 2017 10:00)    SUBJECTIVE / OVERNIGHT EVENTS:  She says she feels well. She did not have the peak flow so she was unable to try, but says she usually runs from 200-250. She has not had any visual hallucinations with the steroids she is on. She is still having polyuria. She complains of not having a BM since her admission.      ROS  :   General             :  No fever , no chills  HEENT              :  No dyphagia , no bleeding gum    Respiratory       :  pos cough, no hemoptysis , improved SOB as per patient  but still has SOB on ambulation  Cardiovascular  :  No chest pain , no SOB, no palpitation, pos legs swelling    GI                    :   No abdominal pain , no diarrhea , no vomiting ,patient states to feel constipated                    :    pos increased urination, no dysuria  Musculoskeletal:  discomfort on the feet due to chronic leg edema  Neuro             :   No change in mentation     MEDICATIONS  (STANDING):  ALBUTerol/ipratropium for Nebulization 3 milliLiter(s) Nebulizer every 6 hours  insulin lispro (HumaLOG) corrective regimen sliding scale   SubCutaneous three times a day before meals  insulin lispro (HumaLOG) corrective regimen sliding scale   SubCutaneous at bedtime  dextrose 5%. 1000 milliLiter(s) (50 mL/Hr) IV Continuous <Continuous>  dextrose 50% Injectable 12.5 Gram(s) IV Push once  insulin glargine Injectable (LANTUS) 8 Unit(s) SubCutaneous at bedtime  dextrose 50% Injectable 25 Gram(s) IV Push once  dextrose 50% Injectable 25 Gram(s) IV Push once  aspirin enteric coated 81 milliGRAM(s) Oral daily  enalapril 10 milliGRAM(s) Oral two times a day  loratadine 10 milliGRAM(s) Oral daily  atorvastatin 20 milliGRAM(s) Oral at bedtime  famotidine    Tablet 10 milliGRAM(s) Oral daily  montelukast 10 milliGRAM(s) Oral daily  buDESOnide  80 MICROgram(s)/formoterol 4.5 MICROgram(s) Inhaler 2 Puff(s) Inhalation two times a day  enoxaparin Injectable 40 milliGRAM(s) SubCutaneous every 24 hours  predniSONE   Tablet 50 milliGRAM(s) Oral daily  docusate sodium 100 milliGRAM(s) Oral three times a day  senna 2 Tablet(s) Oral at bedtime  sodium chloride 0.65% Nasal 1 Spray(s) Both Nostrils three times a day  cefTRIAXone   IVPB 1 Gram(s) IV Intermittent every 24 hours  fluticasone propionate 50 MICROgram(s)/spray Nasal Spray 1 Spray(s) Both Nostrils two times a day  insulin lispro Injectable (HumaLOG) 3 Unit(s) SubCutaneous three times a day before meals    MEDICATIONS  (PRN):  dextrose Gel 1 Dose(s) Oral once PRN Blood Glucose LESS THAN 70 milliGRAM(s)/deciliter  glucagon  Injectable 1 milliGRAM(s) IntraMuscular once PRN Glucose LESS THAN 70 milligrams/deciliter  polyethylene glycol 3350 17 Gram(s) Oral daily PRN Constipation  bisacodyl Suppository 10 milliGRAM(s) Rectal daily PRN Constipation    Vital Signs Last 24 Hrs  T(C): 36.8 (16 Sep 2017 05:54), Max: 36.9 (15 Sep 2017 12:46)  T(F): 98.3 (16 Sep 2017 05:54), Max: 98.4 (15 Sep 2017 12:46)  HR: 86 (16 Sep 2017 05:54) (74 - 86)  BP: 144/87 (16 Sep 2017 05:54) (143/78 - 151/79)  RR: 18 (16 Sep 2017 05:54) (16 - 18)  SpO2: 97% (16 Sep 2017 05:54) (96% - 98%)    CAPILLARY BLOOD GLUCOSE  174 (15 Sep 2017 22:19)  184 (15 Sep 2017 18:24)  381 (15 Sep 2017 12:16)    General:  No fever , no chills  HEENT:  No dyphagia , no bleeding gum , +hoarseness since admission.   Respiratory:  pos cough, no hemoptysis , improved SOB as per patient   Cardiovascular:  No chest pain , no SOB, no palpitation, pos legs swelling    G :   No abdominal pain , no diarrhea , no vomiting   :  increased urination, no dysuria  Musculoskeletal: discomfort on the feet due to chronic leg edema  Neuro: no change in mentation     PHYSICAL EXAM:  GENERAL: NAD, well-developed  HEAD:  Atraumatic, Normocephalic  EYES: EOMI, PERRLA, conjunctiva and sclera clear  NECK: Supple, No JVD  CHEST/LUNG: very infrequent wheezing   HEART: Regular rate and rhythm; No murmurs, rubs, or gallops, no JVD  ABDOMEN: Soft, Nontender, Nondistended; Bowel sounds present  EXTREMITIES:  2+ Peripheral Pulses, No clubbing, cyanosis, pos b/l leg 2+ edema  PSYCH: AAOx3  NEUROLOGY: non-focal  SKIN: No rashes or lesions    LABS:                        12.5   11.0  )-----------( 112      ( 14 Sep 2017 11:58 )             38.1     Auto Eosinophil # x     / Auto Eosinophil % x     / Auto Neutrophil # x     / Auto Neutrophil % x     / BANDS % x        -14    140  |  102  |  23  ----------------------------<  276<H>  4.7   |  25  |  0.83    Ca    9.5      14 Sep 2017 11:58  Urinalysis Basic - ( 14 Sep 2017 14:33 )    Color: x / Appearance: Clear / S.016 / pH: x  Gluc: x / Ketone: Negative  / Bili: Negative / Urobili: Negative   Blood: x / Protein: Trace / Nitrite: Negative   Leuk Esterase: Large / RBC: 0-2 /HPF / WBC 10-25 /HPF   Sq Epi: x / Non Sq Epi: x / Bacteria: Few /HPF

## 2017-09-16 NOTE — PROGRESS NOTE ADULT - PROBLEM SELECTOR PLAN 6
Last BM 3 days ago.   -cw senna, colace, miralax.  - lactulose today  will start Dulcolax suppository once a day for 2 days / PRN Last BM 3 days ago.   -cw senna, colace, miralax.  - lactulose today  Pt states that she refused the Dulcolax suppository last night and wants raisin bran cereal which always help regulate her BM  will start Dulcolax suppository once a day for 2 days / PRN

## 2017-09-16 NOTE — PROGRESS NOTE ADULT - PROBLEM SELECTOR PLAN 5
Pt noted to have NICM w/ HFrEF s/p AICD and diastolic dysfunction. Pt thinks her legs are mildly swollen but does not want to take her home dose of lasix because she already has increased urination.   - c/w asa, atorvastatin, enalapril  -lasix prn if legs continue to be swollen  -does not take metoprolol bc of asthma.

## 2017-09-16 NOTE — PROGRESS NOTE ADULT - PROBLEM SELECTOR PLAN 2
+Urinalysis positive with many bacteria and pts with symptoms of polyuria. No dysuria.   Last day of ceftriaxone today  UCx non contributory

## 2017-09-16 NOTE — PROGRESS NOTE ADULT - PROBLEM SELECTOR PLAN 3
hgba1c 7.9, FS elevated. Likely 2/2 steroids. Also glucose in urine.   - Monitor FSG on new insulin dosing. Added 3U humalog TID  -cw PASTORA and check FS TID and qhs.

## 2017-09-17 PROCEDURE — 99233 SBSQ HOSP IP/OBS HIGH 50: CPT | Mod: GC

## 2017-09-17 PROCEDURE — 99223 1ST HOSP IP/OBS HIGH 75: CPT | Mod: GC

## 2017-09-17 PROCEDURE — 71010: CPT | Mod: 26

## 2017-09-17 RX ORDER — INSULIN LISPRO 100/ML
5 VIAL (ML) SUBCUTANEOUS
Qty: 0 | Refills: 0 | Status: DISCONTINUED | OUTPATIENT
Start: 2017-09-17 | End: 2017-09-20

## 2017-09-17 RX ADMIN — Medication 3 MILLILITER(S): at 06:15

## 2017-09-17 RX ADMIN — MONTELUKAST 10 MILLIGRAM(S): 4 TABLET, CHEWABLE ORAL at 11:21

## 2017-09-17 RX ADMIN — Medication 1 SPRAY(S): at 05:43

## 2017-09-17 RX ADMIN — Medication 10 MILLIGRAM(S): at 17:35

## 2017-09-17 RX ADMIN — Medication 3 MILLILITER(S): at 11:24

## 2017-09-17 RX ADMIN — Medication 200 MILLIGRAM(S): at 09:45

## 2017-09-17 RX ADMIN — Medication 100 MILLIGRAM(S): at 13:36

## 2017-09-17 RX ADMIN — Medication 3 MILLILITER(S): at 23:11

## 2017-09-17 RX ADMIN — LORATADINE 10 MILLIGRAM(S): 10 TABLET ORAL at 11:20

## 2017-09-17 RX ADMIN — Medication 3 MILLILITER(S): at 17:12

## 2017-09-17 RX ADMIN — Medication 5 UNIT(S): at 18:46

## 2017-09-17 RX ADMIN — Medication 3 UNIT(S): at 09:45

## 2017-09-17 RX ADMIN — ATORVASTATIN CALCIUM 20 MILLIGRAM(S): 80 TABLET, FILM COATED ORAL at 21:28

## 2017-09-17 RX ADMIN — Medication 1: at 09:44

## 2017-09-17 RX ADMIN — BUDESONIDE AND FORMOTEROL FUMARATE DIHYDRATE 2 PUFF(S): 160; 4.5 AEROSOL RESPIRATORY (INHALATION) at 17:13

## 2017-09-17 RX ADMIN — Medication 40 MILLIGRAM(S): at 05:44

## 2017-09-17 RX ADMIN — Medication 1: at 18:46

## 2017-09-17 RX ADMIN — Medication 5 UNIT(S): at 13:36

## 2017-09-17 RX ADMIN — Medication 10 MILLIGRAM(S): at 05:43

## 2017-09-17 RX ADMIN — ENOXAPARIN SODIUM 40 MILLIGRAM(S): 100 INJECTION SUBCUTANEOUS at 05:43

## 2017-09-17 RX ADMIN — Medication 1 SPRAY(S): at 13:36

## 2017-09-17 RX ADMIN — Medication 3: at 13:36

## 2017-09-17 RX ADMIN — Medication 1 SPRAY(S): at 21:28

## 2017-09-17 RX ADMIN — Medication 1 SPRAY(S): at 17:35

## 2017-09-17 RX ADMIN — BUDESONIDE AND FORMOTEROL FUMARATE DIHYDRATE 2 PUFF(S): 160; 4.5 AEROSOL RESPIRATORY (INHALATION) at 06:15

## 2017-09-17 RX ADMIN — INSULIN GLARGINE 8 UNIT(S): 100 INJECTION, SOLUTION SUBCUTANEOUS at 22:03

## 2017-09-17 RX ADMIN — Medication 81 MILLIGRAM(S): at 11:20

## 2017-09-17 RX ADMIN — Medication 1 SPRAY(S): at 05:55

## 2017-09-17 RX ADMIN — FAMOTIDINE 10 MILLIGRAM(S): 10 INJECTION INTRAVENOUS at 11:20

## 2017-09-17 NOTE — PROGRESS NOTE ADULT - PROBLEM SELECTOR PLAN 4
Pt's with no symptoms or signs of hyperthyroidism. TSH was low in may 2016 also. Free T4 was normal  -likely 2/2 steroids.

## 2017-09-17 NOTE — CONSULT NOTE ADULT - SUBJECTIVE AND OBJECTIVE BOX
CHIEF COMPLAINT:    HPI:    PAST MEDICAL & SURGICAL HISTORY:  Diverticulitis  Cardiomyopathy  Kidney stone  COPD (chronic obstructive pulmonary disease)  Cardiac Pacemaker  HTN - Hypertension  Gout  Diabetes  Congestive Heart Failure  Asthma  S/P cholecystectomy  AICD (Automatic Cardioverter/Defibrillator) Present: inserted in Aug, 2008. Due for battery change in 1 month. ( [employer]) . Inserted by Dr Duffy  S/P Cholecystectomy      FAMILY HISTORY:  No pertinent family history in first degree relatives      SOCIAL HISTORY:  Smoking: __ packs x ___ years  EtOH Use:  Marital Status:  Occupation:  Recent Travel:  Country of Birth:  Advance Directives:    Allergies    Coreg (Other)  digoxin (Other; Short breath (Mild to Mod))  penicillins (Hives)  Solu-Medrol (Other)    Intolerances        HOME MEDICATIONS:    REVIEW OF SYSTEMS:  Constitutional:   Eyes:  ENT:  CV:  Resp:  GI:  :  MSK:  Integumentary:  Neurological:  Psychiatric:  Endocrine:  Hematologic/Lymphatic:  Allergic/Immunologic:  [ ] All other systems negative  [ ] Unable to assess ROS because ________    OBJECTIVE:  ICU Vital Signs Last 24 Hrs  T(C): 36.4 (17 Sep 2017 05:09), Max: 36.6 (16 Sep 2017 14:11)  T(F): 97.5 (17 Sep 2017 05:09), Max: 97.9 (16 Sep 2017 14:11)  HR: 80 (17 Sep 2017 05:09) (64 - 85)  BP: 106/67 (17 Sep 2017 05:09) (106/67 - 158/88)  BP(mean): --  ABP: --  ABP(mean): --  RR: 18 (17 Sep 2017 05:09) (18 - 18)  SpO2: 98% (17 Sep 2017 05:09) (95% - 98%)        CAPILLARY BLOOD GLUCOSE  156 (17 Sep 2017 09:00)          PHYSICAL EXAM:  General:  AAOx3  HEENT: EOMI, PERRL  Lymph Nodes: No LAD  Neck: JVP 4-6cm  Respiratory:   Cardiovascular: RRR, no m/r/g  Abdomen: soft, NT/ND, no HSM  Extremities: no c/c/e  Skin: nl. skin turgor  Neurological: no deficitis      HOSPITAL MEDICATIONS:  MEDICATIONS  (STANDING):  ALBUTerol/ipratropium for Nebulization 3 milliLiter(s) Nebulizer every 6 hours  insulin lispro (HumaLOG) corrective regimen sliding scale   SubCutaneous three times a day before meals  insulin lispro (HumaLOG) corrective regimen sliding scale   SubCutaneous at bedtime  dextrose 5%. 1000 milliLiter(s) (50 mL/Hr) IV Continuous <Continuous>  dextrose 50% Injectable 12.5 Gram(s) IV Push once  insulin glargine Injectable (LANTUS) 8 Unit(s) SubCutaneous at bedtime  dextrose 50% Injectable 25 Gram(s) IV Push once  dextrose 50% Injectable 25 Gram(s) IV Push once  aspirin enteric coated 81 milliGRAM(s) Oral daily  enalapril 10 milliGRAM(s) Oral two times a day  loratadine 10 milliGRAM(s) Oral daily  atorvastatin 20 milliGRAM(s) Oral at bedtime  famotidine    Tablet 10 milliGRAM(s) Oral daily  montelukast 10 milliGRAM(s) Oral daily  buDESOnide  80 MICROgram(s)/formoterol 4.5 MICROgram(s) Inhaler 2 Puff(s) Inhalation two times a day  enoxaparin Injectable 40 milliGRAM(s) SubCutaneous every 24 hours  docusate sodium 100 milliGRAM(s) Oral three times a day  senna 2 Tablet(s) Oral at bedtime  sodium chloride 0.65% Nasal 1 Spray(s) Both Nostrils three times a day  fluticasone propionate 50 MICROgram(s)/spray Nasal Spray 1 Spray(s) Both Nostrils two times a day  predniSONE   Tablet 40 milliGRAM(s) Oral daily  insulin lispro Injectable (HumaLOG) 5 Unit(s) SubCutaneous three times a day before meals    MEDICATIONS  (PRN):  dextrose Gel 1 Dose(s) Oral once PRN Blood Glucose LESS THAN 70 milliGRAM(s)/deciliter  glucagon  Injectable 1 milliGRAM(s) IntraMuscular once PRN Glucose LESS THAN 70 milligrams/deciliter  polyethylene glycol 3350 17 Gram(s) Oral daily PRN Constipation  guaiFENesin   Syrup  (Sugar-Free) 200 milliGRAM(s) Oral every 6 hours PRN Cough      LABS:                    MICROBIOLOGY:     RADIOLOGY:  [ ] Reviewed and interpreted by me    PULMONARY FUNCTION TESTS:    EKG: CHIEF COMPLAINT: SOB    HPI:  72F w/ asthma (childhood onset, intubatedx1 2016), NICM w/ HFrEF s/p AICD, DM2 on insulin, HTN, HLD presents to [facility] for asthma exacerbation. The pt reports being at home walking around doing chores when she suddenly developed an athsma attack w/ difficulty breathing and wheeze which was refractory to her nebulizer and rescue inhaler.     Recently, she was seen by her pulmonologist Dr. Riley who gave her a steroid taper with no improvement in sxs.    She endorse compliance with maintenance inhalers.    PAST MEDICAL & SURGICAL HISTORY:  Diverticulitis  Cardiomyopathy  Kidney stone  COPD (chronic obstructive pulmonary disease)  Cardiac Pacemaker  HTN - Hypertension  Gout  Diabetes  Congestive Heart Failure  Asthma  S/P cholecystectomy  AICD (Automatic Cardioverter/Defibrillator) Present: inserted in Aug, 2008. Due for battery change in 1 month. ( [employer]) . Inserted by Dr Duffy  S/P Cholecystectomy      FAMILY HISTORY:  No pertinent family history in first degree relatives      SOCIAL HISTORY:  Smoking: denies  EtOH Use: none      Allergies    Coreg (Other)  digoxin (Other; Short breath (Mild to Mod))  penicillins (Hives)  Solu-Medrol (Other)    Intolerances        HOME MEDICATIONS:  · 	furosemide 40 mg oral tablet: 1 tab(s) orally once a day, As Needed for leg swelling  · 	aspirin 81 mg oral delayed release tablet: 1 tab(s) orally once a day  · 	atorvastatin 20 mg oral tablet: 1 tab(s) orally once a day (at bedtime)  · 	enalapril 10 mg oral tablet: 1 tab(s) orally 2 times a day  · 	loratadine 10 mg oral tablet: 1 tab(s) orally once a day  · 	montelukast 10 mg oral tablet: 1 tab(s) orally once a day  · 	albuterol-ipratropium 2.5 mg-0.5 mg/3 mL inhalation solution: 3 milliliter(s) inhaled every 6 hours  · 	Lantus 100 units/mL subcutaneous solution: 8 unit(s) subcutaneous once a day  · 	TheraTears ophthalmic solution: 1 drop(s) in each eye once a day  · 	famotidine 10 mg oral tablet: 1 tab(s) orally once  · 	magnesium oxide 500 mg oral tablet: 1 tab(s) orally 3 times a day  · 	potassium chloride 20 mEq oral powder for reconstitution: 1 each orally once a day  · 	Ventolin HFA 90 mcg/inh inhalation aerosol: 2 puff(s) inhaled 4 times a day, As Needed  · 	Dulera 100 mcg-5 mcg/inh inhalation aerosol: 2 puff(s) inhaled 2 times a day      REVIEW OF SYSTEMS:  Constitutional:   Eyes:  ENT:  CV:  Resp: SOB/wheeze  GI:  :  MSK:  Integumentary:  Neurological:  Psychiatric:  Endocrine:  Hematologic/Lymphatic:  Allergic/Immunologic:  [x ] All other systems negative  [ ] Unable to assess ROS because ________    OBJECTIVE:  ICU Vital Signs Last 24 Hrs  T(C): 36.4 (17 Sep 2017 05:09), Max: 36.6 (16 Sep 2017 14:11)  T(F): 97.5 (17 Sep 2017 05:09), Max: 97.9 (16 Sep 2017 14:11)  HR: 80 (17 Sep 2017 05:09) (64 - 85)  BP: 106/67 (17 Sep 2017 05:09) (106/67 - 158/88)  BP(mean): --  ABP: --  ABP(mean): --  RR: 18 (17 Sep 2017 05:09) (18 - 18)  SpO2: 98% (17 Sep 2017 05:09) (95% - 98%)        CAPILLARY BLOOD GLUCOSE  156 (17 Sep 2017 09:00)          PHYSICAL EXAM:  General:  AAOx3  HEENT: EOMI, PERRL  Lymph Nodes: No LAD  Neck: JVP 4-6cm  Respiratory: end-expiratory wheeze  Cardiovascular: RRR, no m/r/g  Abdomen: soft, NT/ND, no HSM  Extremities: no c/c/e  Skin: nl. skin turgor  Neurological: no deficitis      HOSPITAL MEDICATIONS:  MEDICATIONS  (STANDING):  ALBUTerol/ipratropium for Nebulization 3 milliLiter(s) Nebulizer every 6 hours  insulin lispro (HumaLOG) corrective regimen sliding scale   SubCutaneous three times a day before meals  insulin lispro (HumaLOG) corrective regimen sliding scale   SubCutaneous at bedtime  dextrose 5%. 1000 milliLiter(s) (50 mL/Hr) IV Continuous <Continuous>  dextrose 50% Injectable 12.5 Gram(s) IV Push once  insulin glargine Injectable (LANTUS) 8 Unit(s) SubCutaneous at bedtime  dextrose 50% Injectable 25 Gram(s) IV Push once  dextrose 50% Injectable 25 Gram(s) IV Push once  aspirin enteric coated 81 milliGRAM(s) Oral daily  enalapril 10 milliGRAM(s) Oral two times a day  loratadine 10 milliGRAM(s) Oral daily  atorvastatin 20 milliGRAM(s) Oral at bedtime  famotidine    Tablet 10 milliGRAM(s) Oral daily  montelukast 10 milliGRAM(s) Oral daily  buDESOnide  80 MICROgram(s)/formoterol 4.5 MICROgram(s) Inhaler 2 Puff(s) Inhalation two times a day  enoxaparin Injectable 40 milliGRAM(s) SubCutaneous every 24 hours  docusate sodium 100 milliGRAM(s) Oral three times a day  senna 2 Tablet(s) Oral at bedtime  sodium chloride 0.65% Nasal 1 Spray(s) Both Nostrils three times a day  fluticasone propionate 50 MICROgram(s)/spray Nasal Spray 1 Spray(s) Both Nostrils two times a day  predniSONE   Tablet 40 milliGRAM(s) Oral daily  insulin lispro Injectable (HumaLOG) 5 Unit(s) SubCutaneous three times a day before meals    MEDICATIONS  (PRN):  dextrose Gel 1 Dose(s) Oral once PRN Blood Glucose LESS THAN 70 milliGRAM(s)/deciliter  glucagon  Injectable 1 milliGRAM(s) IntraMuscular once PRN Glucose LESS THAN 70 milligrams/deciliter  polyethylene glycol 3350 17 Gram(s) Oral daily PRN Constipation  guaiFENesin   Syrup  (Sugar-Free) 200 milliGRAM(s) Oral every 6 hours PRN Cough      LABS:            RADIOLOGY:  [x ] Reviewed and interpreted by me

## 2017-09-17 NOTE — PROGRESS NOTE ADULT - PROBLEM SELECTOR PLAN 1
Asthma vs COPD exacerbation. Pt feels better with improving wheezing.   - c/w duoneb around the clock, symbicort in hospital, singulair.   -taper prednisone, now on 40mg daily.   -Flonase for post nasal drip  -robitussin for cough  - peak flow  Patient refuses IV steroid due to report of hallucinations Asthma vs COPD exacerbation. Pt feels better with improving wheezing.   - c/w duoneb around the clock, symbicort, singulair.   -taper prednisone, now on 40mg daily.   -Flonase for post nasal drip  -robitussin for cough  - f/up peak flow  -f/up AM cxray.   Patient refuses IV steroid due to report of hallucinations Asthma vs COPD exacerbation. Pt feels better but still with wheezing on exam.   - c/w duoneb around the clock, symbicort, singulair.   -taper prednisone, now on 40mg daily. However, may need to increase dose given persistent wheezing  -will call pulm  -Flonase for post nasal drip  -robitussin for cough  - f/up peak flow  -f/up AM cxray.   Patient refuses IV steroid due to report of hallucinations

## 2017-09-17 NOTE — PROGRESS NOTE ADULT - PROBLEM SELECTOR PLAN 5
Pt noted to have NICM w/ HFrEF s/p AICD and diastolic dysfunction. BNP NOT elevated, so this is likely not heart failure.   -LE swelling improved with compression stockings.  - c/w asa, atorvastatin, enalapril  -lasix prn if legs continue to be swollen  -does not take metoprolol bc of asthma.  -Informed Dr. Hobson, pts cardiologist of patients admission.

## 2017-09-17 NOTE — CONSULT NOTE ADULT - ASSESSMENT
72F w/ asthma, NICM w/ HFrEF s/p AICD, DM2 on insulin, HTN, HLD presents to [facility] for asthma exacerbation.    1.  Asthma-  -Appears severe-persistent in nature with multiple environmental triggers  -Endorses compliance with maintenance inhalers but using Symbicort and Dulera at the same time?  -Clinically, she is stable on RA, faint end-expiratory wheeze  -Cont prednisone with slow taper  -Cont symbicort BID  -Needs pulmonary follow at d/c    Michael Trinidad,   Pulmonary and Critical Care Fellow

## 2017-09-17 NOTE — PROGRESS NOTE ADULT - ASSESSMENT
72F w/ asthma (childhood onset, intubatedx1 2016), NICM w/ HFrEF s/p AICD, DM2 on insulin, HTN, HLD presents to [facility] for SOB/wheezing likely 2/2 asthma vs. COPD exacerbation 2/2 URI vs. allergy.

## 2017-09-17 NOTE — PROGRESS NOTE ADULT - SUBJECTIVE AND OBJECTIVE BOX
Patient is a 72y old  Female who presents with a chief complaint of 72F w/ asthma exacerbation (15 Sep 2017 10:00)      SUBJECTIVE / OVERNIGHT EVENTS: No events overnight. Pt states she feels better. Still complains of wheezing when walking to the bathroom but none when resting. Still complaining of polyuria. Had a BM yesterday.     CONSTITUTIONAL:  No weight loss, fever, chills, weakness or fatigue.  HEENT:  Eyes:  No visual loss, blurred vision, double vision,  No hearing loss, sneezing, congestion, runny nose or sore throat.  SKIN:  No rash or itching.  CARDIOVASCULAR:  No chest pain, chest pressure or chest discomfort. No palpitations or edema.  RESPIRATORY:  No shortness of breath, +cough-improving.   GASTROINTESTINAL:  No anorexia, nausea, vomiting or diarrhea. No abdominal pain or blood.  GENITOURINARY:  Denies hematuria, dysuria.   NEUROLOGICAL:  No headache, dizziness, syncope, paralysis, ataxia, numbness or tingling in the extremities. No change in bowel or bladder control.  MUSCULOSKELETAL:  No muscle, back pain, joint pain or stiffness.  HEMATOLOGIC:  No anemia, bleeding or bruising.  LYMPHATICS:  No enlarged nodes.   PSYCHIATRIC:  No history of depression or anxiety.    MEDICATIONS  (STANDING):  ALBUTerol/ipratropium for Nebulization 3 milliLiter(s) Nebulizer every 6 hours  insulin lispro (HumaLOG) corrective regimen sliding scale   SubCutaneous three times a day before meals  insulin lispro (HumaLOG) corrective regimen sliding scale   SubCutaneous at bedtime  dextrose 5%. 1000 milliLiter(s) (50 mL/Hr) IV Continuous <Continuous>  dextrose 50% Injectable 12.5 Gram(s) IV Push once  insulin glargine Injectable (LANTUS) 8 Unit(s) SubCutaneous at bedtime  dextrose 50% Injectable 25 Gram(s) IV Push once  dextrose 50% Injectable 25 Gram(s) IV Push once  aspirin enteric coated 81 milliGRAM(s) Oral daily  enalapril 10 milliGRAM(s) Oral two times a day  loratadine 10 milliGRAM(s) Oral daily  atorvastatin 20 milliGRAM(s) Oral at bedtime  famotidine    Tablet 10 milliGRAM(s) Oral daily  montelukast 10 milliGRAM(s) Oral daily  buDESOnide  80 MICROgram(s)/formoterol 4.5 MICROgram(s) Inhaler 2 Puff(s) Inhalation two times a day  enoxaparin Injectable 40 milliGRAM(s) SubCutaneous every 24 hours  docusate sodium 100 milliGRAM(s) Oral three times a day  senna 2 Tablet(s) Oral at bedtime  sodium chloride 0.65% Nasal 1 Spray(s) Both Nostrils three times a day  fluticasone propionate 50 MICROgram(s)/spray Nasal Spray 1 Spray(s) Both Nostrils two times a day  insulin lispro Injectable (HumaLOG) 3 Unit(s) SubCutaneous three times a day before meals  predniSONE   Tablet 40 milliGRAM(s) Oral daily    MEDICATIONS  (PRN):  dextrose Gel 1 Dose(s) Oral once PRN Blood Glucose LESS THAN 70 milliGRAM(s)/deciliter  glucagon  Injectable 1 milliGRAM(s) IntraMuscular once PRN Glucose LESS THAN 70 milligrams/deciliter  polyethylene glycol 3350 17 Gram(s) Oral daily PRN Constipation  bisacodyl Suppository 10 milliGRAM(s) Rectal daily PRN Constipation  guaiFENesin   Syrup  (Sugar-Free) 200 milliGRAM(s) Oral every 6 hours PRN Cough      I&O's Summary      Vital Signs Last 24 Hrs  T(C): 36.4 (17 Sep 2017 05:09), Max: 36.6 (16 Sep 2017 14:11)  T(F): 97.5 (17 Sep 2017 05:09), Max: 97.9 (16 Sep 2017 14:11)  HR: 80 (17 Sep 2017 05:09) (64 - 85)  BP: 106/67 (17 Sep 2017 05:09) (106/67 - 158/88)  BP(mean): --  RR: 18 (17 Sep 2017 05:09) (18 - 18)  SpO2: 98% (17 Sep 2017 05:09) (95% - 98%)  CAPILLARY BLOOD GLUCOSE  216 (16 Sep 2017 22:08)  290 (16 Sep 2017 18:08)  299 (16 Sep 2017 12:15)  151 (16 Sep 2017 08:54)      PHYSICAL EXAM  GENERAL: NAD, well-developed  HEAD:  Atraumatic, Normocephalic  EYES: EOMI, PERRLA, conjunctiva and sclera clear  NECK: Supple, No JVD  CHEST/LUNG:  mild diffuse expiratory wheezing-improving.   HEART: Regular rate and rhythm; No murmurs, rubs, or gallops  ABDOMEN: Soft, Nontender, Nondistended; Bowel sounds present  EXTREMITIES:  compression stockings in place.   PSYCH: AAOx3  NEUROLOGY: non-focal  SKIN: No rashes or lesions    LABS: no AM labs.     Cxray pending. Patient is a 72y old  Female who presents with a chief complaint of 72F w/ asthma exacerbation (15 Sep 2017 10:00)      SUBJECTIVE / OVERNIGHT EVENTS: No events overnight. Pt states she feels better. Still complains of wheezing when walking to the bathroom but none when resting. Still complaining of polyuria. Had a BM yesterday.     CONSTITUTIONAL:  No weight loss, fever, chills, weakness or fatigue.  HEENT:  Eyes:  No visual loss, blurred vision, double vision,  No hearing loss, sneezing, congestion, runny nose or sore throat.  SKIN:  No rash or itching.  CARDIOVASCULAR:  No chest pain, chest pressure or chest discomfort. No palpitations or edema. +wheezing  RESPIRATORY:  No shortness of breath, +cough-improving.   GASTROINTESTINAL:  No anorexia, nausea, vomiting or diarrhea. No abdominal pain or blood.  GENITOURINARY:  Denies hematuria, dysuria.   NEUROLOGICAL:  No headache, dizziness, syncope, paralysis, ataxia, numbness or tingling in the extremities. No change in bowel or bladder control.  MUSCULOSKELETAL:  No muscle, back pain, joint pain or stiffness.  HEMATOLOGIC:  No anemia, bleeding or bruising.  LYMPHATICS:  No enlarged nodes.   PSYCHIATRIC:  No history of depression or anxiety.    MEDICATIONS  (STANDING):  ALBUTerol/ipratropium for Nebulization 3 milliLiter(s) Nebulizer every 6 hours  insulin lispro (HumaLOG) corrective regimen sliding scale   SubCutaneous three times a day before meals  insulin lispro (HumaLOG) corrective regimen sliding scale   SubCutaneous at bedtime  dextrose 5%. 1000 milliLiter(s) (50 mL/Hr) IV Continuous <Continuous>  dextrose 50% Injectable 12.5 Gram(s) IV Push once  insulin glargine Injectable (LANTUS) 8 Unit(s) SubCutaneous at bedtime  dextrose 50% Injectable 25 Gram(s) IV Push once  dextrose 50% Injectable 25 Gram(s) IV Push once  aspirin enteric coated 81 milliGRAM(s) Oral daily  enalapril 10 milliGRAM(s) Oral two times a day  loratadine 10 milliGRAM(s) Oral daily  atorvastatin 20 milliGRAM(s) Oral at bedtime  famotidine    Tablet 10 milliGRAM(s) Oral daily  montelukast 10 milliGRAM(s) Oral daily  buDESOnide  80 MICROgram(s)/formoterol 4.5 MICROgram(s) Inhaler 2 Puff(s) Inhalation two times a day  enoxaparin Injectable 40 milliGRAM(s) SubCutaneous every 24 hours  docusate sodium 100 milliGRAM(s) Oral three times a day  senna 2 Tablet(s) Oral at bedtime  sodium chloride 0.65% Nasal 1 Spray(s) Both Nostrils three times a day  fluticasone propionate 50 MICROgram(s)/spray Nasal Spray 1 Spray(s) Both Nostrils two times a day  insulin lispro Injectable (HumaLOG) 3 Unit(s) SubCutaneous three times a day before meals  predniSONE   Tablet 40 milliGRAM(s) Oral daily    MEDICATIONS  (PRN):  dextrose Gel 1 Dose(s) Oral once PRN Blood Glucose LESS THAN 70 milliGRAM(s)/deciliter  glucagon  Injectable 1 milliGRAM(s) IntraMuscular once PRN Glucose LESS THAN 70 milligrams/deciliter  polyethylene glycol 3350 17 Gram(s) Oral daily PRN Constipation  bisacodyl Suppository 10 milliGRAM(s) Rectal daily PRN Constipation  guaiFENesin   Syrup  (Sugar-Free) 200 milliGRAM(s) Oral every 6 hours PRN Cough      I&O's Summary      Vital Signs Last 24 Hrs  T(C): 36.4 (17 Sep 2017 05:09), Max: 36.6 (16 Sep 2017 14:11)  T(F): 97.5 (17 Sep 2017 05:09), Max: 97.9 (16 Sep 2017 14:11)  HR: 80 (17 Sep 2017 05:09) (64 - 85)  BP: 106/67 (17 Sep 2017 05:09) (106/67 - 158/88)  BP(mean): --  RR: 18 (17 Sep 2017 05:09) (18 - 18)  SpO2: 98% (17 Sep 2017 05:09) (95% - 98%)  CAPILLARY BLOOD GLUCOSE  216 (16 Sep 2017 22:08)  290 (16 Sep 2017 18:08)  299 (16 Sep 2017 12:15)  151 (16 Sep 2017 08:54)      PHYSICAL EXAM  GENERAL: NAD, well-developed  HEAD:  Atraumatic, Normocephalic  EYES: EOMI, PERRLA, conjunctiva and sclera clear  NECK: Supple, No JVD  CHEST/LUNG:  diffuse expiratory wheezing  HEART: Regular rate and rhythm; No murmurs, rubs, or gallops  ABDOMEN: Soft, Nontender, Nondistended; Bowel sounds present  EXTREMITIES:  compression stockings in place.   PSYCH: AAOx3  NEUROLOGY: non-focal  SKIN: No rashes or lesions    LABS: no AM labs.     Cxray pending.

## 2017-09-17 NOTE — PROGRESS NOTE ADULT - PROBLEM SELECTOR PLAN 3
hgba1c 7.9. FS improving after humalog 2 started.   - cw lantus 8 units and humalog 3 u TID.   -cw PASTORA and check FS TID and qhs. hgba1c 7.9. FS improving after humalog 3 started.   - cw lantus 8 units and humalog 3 u TID. Will increase to 5 TID.   -cw PASTORA and check FS TID and qhs.

## 2017-09-18 ENCOUNTER — APPOINTMENT (OUTPATIENT)
Dept: CARDIOLOGY | Facility: CLINIC | Age: 72
End: 2017-09-18

## 2017-09-18 PROCEDURE — 99233 SBSQ HOSP IP/OBS HIGH 50: CPT | Mod: GC

## 2017-09-18 RX ORDER — IPRATROPIUM/ALBUTEROL SULFATE 18-103MCG
3 AEROSOL WITH ADAPTER (GRAM) INHALATION EVERY 4 HOURS
Qty: 0 | Refills: 0 | Status: DISCONTINUED | OUTPATIENT
Start: 2017-09-18 | End: 2017-09-20

## 2017-09-18 RX ORDER — BUDESONIDE AND FORMOTEROL FUMARATE DIHYDRATE 160; 4.5 UG/1; UG/1
2 AEROSOL RESPIRATORY (INHALATION)
Qty: 0 | Refills: 0 | Status: DISCONTINUED | OUTPATIENT
Start: 2017-09-18 | End: 2017-09-21

## 2017-09-18 RX ADMIN — MONTELUKAST 10 MILLIGRAM(S): 4 TABLET, CHEWABLE ORAL at 13:33

## 2017-09-18 RX ADMIN — LORATADINE 10 MILLIGRAM(S): 10 TABLET ORAL at 13:32

## 2017-09-18 RX ADMIN — Medication 100 MILLIGRAM(S): at 13:33

## 2017-09-18 RX ADMIN — Medication 3 MILLILITER(S): at 14:36

## 2017-09-18 RX ADMIN — Medication 5 UNIT(S): at 09:07

## 2017-09-18 RX ADMIN — Medication 1 SPRAY(S): at 17:42

## 2017-09-18 RX ADMIN — Medication 10 MILLIGRAM(S): at 05:24

## 2017-09-18 RX ADMIN — Medication 81 MILLIGRAM(S): at 13:32

## 2017-09-18 RX ADMIN — FAMOTIDINE 10 MILLIGRAM(S): 10 INJECTION INTRAVENOUS at 13:33

## 2017-09-18 RX ADMIN — ENOXAPARIN SODIUM 40 MILLIGRAM(S): 100 INJECTION SUBCUTANEOUS at 05:24

## 2017-09-18 RX ADMIN — Medication 10 MILLIGRAM(S): at 17:42

## 2017-09-18 RX ADMIN — Medication 3 MILLILITER(S): at 05:29

## 2017-09-18 RX ADMIN — Medication 5 UNIT(S): at 18:24

## 2017-09-18 RX ADMIN — INSULIN GLARGINE 8 UNIT(S): 100 INJECTION, SOLUTION SUBCUTANEOUS at 23:26

## 2017-09-18 RX ADMIN — Medication 4: at 13:33

## 2017-09-18 RX ADMIN — Medication 3 MILLILITER(S): at 18:35

## 2017-09-18 RX ADMIN — Medication 1 SPRAY(S): at 05:24

## 2017-09-18 RX ADMIN — Medication 3 MILLILITER(S): at 22:05

## 2017-09-18 RX ADMIN — BUDESONIDE AND FORMOTEROL FUMARATE DIHYDRATE 2 PUFF(S): 160; 4.5 AEROSOL RESPIRATORY (INHALATION) at 18:36

## 2017-09-18 RX ADMIN — Medication 1 SPRAY(S): at 13:34

## 2017-09-18 RX ADMIN — Medication 5 UNIT(S): at 13:34

## 2017-09-18 RX ADMIN — Medication 200 MILLIGRAM(S): at 21:22

## 2017-09-18 RX ADMIN — Medication 1 SPRAY(S): at 21:22

## 2017-09-18 RX ADMIN — Medication 40 MILLIGRAM(S): at 05:25

## 2017-09-18 RX ADMIN — BUDESONIDE AND FORMOTEROL FUMARATE DIHYDRATE 2 PUFF(S): 160; 4.5 AEROSOL RESPIRATORY (INHALATION) at 05:29

## 2017-09-18 RX ADMIN — ATORVASTATIN CALCIUM 20 MILLIGRAM(S): 80 TABLET, FILM COATED ORAL at 21:22

## 2017-09-18 RX ADMIN — Medication 1: at 09:07

## 2017-09-18 NOTE — PROGRESS NOTE ADULT - PROBLEM SELECTOR PLAN 1
Asthma vs COPD exacerbation. Wheezing has improved. Pulm recs appreciated  -will cw current regimen  - c/w duoneb around the clock, symbicort, singulair.   -taper prednisone, now on 40mg daily.   -Flonase for post nasal drip  -robitussin for cough  - daily peak flow-pt states her normal is 200  Patient refuses IV steroid due to report of hallucinations

## 2017-09-18 NOTE — PROGRESS NOTE ADULT - ASSESSMENT
72F w/ asthma (childhood onset, intubatedx1 2016), NICM w/ HFrEF s/p AICD, DM2 on insulin, HTN, HLD presents to [facility] for SOB/wheezing likely 2/2 asthma vs. COPD exacerbation 2/2 URI vs. allergy.

## 2017-09-18 NOTE — PROGRESS NOTE ADULT - PROBLEM SELECTOR PLAN 5
Pt noted to have NICM w/ HFrEF s/p AICD and diastolic dysfunction. BNP NOT elevated, likely not heart failure. Pt also seems euvolemic and cxray showed clear lungs.   -LE swelling improved with compression stockings.  - c/w asa, atorvastatin, enalapril  -lasix prn if legs continue to be swollen  -does not take metoprolol bc of asthma.  -Informed Dr. Hobson, pts cardiologist of patients admission. Pt noted to have NICM w/ HFrEF s/p AICD and diastolic dysfunction. BNP NOT elevated, likely not heart failure. Pt also seems euvolemic and cxray showed clear lungs.   -LE swelling improved with compression stockings.  - c/w asa, atorvastatin, enalapril  -lasix prn if legs are swollen  -does not take metoprolol bc of asthma.  -Informed Dr. Hobson, pts cardiologist of patients admission.

## 2017-09-18 NOTE — PROGRESS NOTE ADULT - SUBJECTIVE AND OBJECTIVE BOX
Patient is a 72y old  Female who presents with a chief complaint of 72F w/ asthma exacerbation (15 Sep 2017 10:00)      SUBJECTIVE / OVERNIGHT EVENTS: No acute events overnight. Pt states she still feels she gets wheezy after she walks to the bathroom. Says she feels mildly improved from admission. No chest pain, resp distress, palpitations, dizziness. Says her legs are less swollen today. +BM yesterday. Peak flow this morning 140 pre treatment and 160 post.     MEDICATIONS  (STANDING):  ALBUTerol/ipratropium for Nebulization 3 milliLiter(s) Nebulizer every 6 hours  insulin lispro (HumaLOG) corrective regimen sliding scale   SubCutaneous three times a day before meals  insulin lispro (HumaLOG) corrective regimen sliding scale   SubCutaneous at bedtime  dextrose 5%. 1000 milliLiter(s) (50 mL/Hr) IV Continuous <Continuous>  dextrose 50% Injectable 12.5 Gram(s) IV Push once  insulin glargine Injectable (LANTUS) 8 Unit(s) SubCutaneous at bedtime  dextrose 50% Injectable 25 Gram(s) IV Push once  dextrose 50% Injectable 25 Gram(s) IV Push once  aspirin enteric coated 81 milliGRAM(s) Oral daily  enalapril 10 milliGRAM(s) Oral two times a day  loratadine 10 milliGRAM(s) Oral daily  atorvastatin 20 milliGRAM(s) Oral at bedtime  famotidine    Tablet 10 milliGRAM(s) Oral daily  montelukast 10 milliGRAM(s) Oral daily  buDESOnide  80 MICROgram(s)/formoterol 4.5 MICROgram(s) Inhaler 2 Puff(s) Inhalation two times a day  enoxaparin Injectable 40 milliGRAM(s) SubCutaneous every 24 hours  docusate sodium 100 milliGRAM(s) Oral three times a day  senna 2 Tablet(s) Oral at bedtime  sodium chloride 0.65% Nasal 1 Spray(s) Both Nostrils three times a day  fluticasone propionate 50 MICROgram(s)/spray Nasal Spray 1 Spray(s) Both Nostrils two times a day  predniSONE   Tablet 40 milliGRAM(s) Oral daily  insulin lispro Injectable (HumaLOG) 5 Unit(s) SubCutaneous three times a day before meals    MEDICATIONS  (PRN):  dextrose Gel 1 Dose(s) Oral once PRN Blood Glucose LESS THAN 70 milliGRAM(s)/deciliter  glucagon  Injectable 1 milliGRAM(s) IntraMuscular once PRN Glucose LESS THAN 70 milligrams/deciliter  polyethylene glycol 3350 17 Gram(s) Oral daily PRN Constipation  guaiFENesin   Syrup  (Sugar-Free) 200 milliGRAM(s) Oral every 6 hours PRN Cough      I&O's Summary    17 Sep 2017 07:01  -  18 Sep 2017 07:00  --------------------------------------------------------  IN: 720 mL / OUT: 0 mL / NET: 720 mL        Vital Signs Last 24 Hrs  T(C): 36.6 (18 Sep 2017 04:47), Max: 36.6 (17 Sep 2017 14:39)  T(F): 97.8 (18 Sep 2017 04:47), Max: 97.9 (17 Sep 2017 14:39)  HR: 91 (18 Sep 2017 05:33) (80 - 91)  BP: 125/85 (18 Sep 2017 04:47) (125/85 - 152/81)  BP(mean): --  RR: 18 (18 Sep 2017 04:47) (18 - 19)  SpO2: 98% (18 Sep 2017 05:33) (96% - 98%)  CAPILLARY BLOOD GLUCOSE  173 (17 Sep 2017 21:37)  153 (17 Sep 2017 17:22)  276 (17 Sep 2017 13:15)  156 (17 Sep 2017 09:00)          PHYSICAL EXAM:  GENERAL: NAD, well-developed  HEAD:  Atraumatic, Normocephalic  EYES: EOMI, PERRLA, conjunctiva and sclera clear  NECK: Supple, No JVD  CHEST/LUNG: diffuse expiratory wheezing-improved from yesterday.   HEART: Regular rate and rhythm; No murmurs, rubs, or gallops  ABDOMEN: Soft, Nontender, Nondistended; Bowel sounds present  EXTREMITIES:  2+ Peripheral Pulses, +compression stockings in place  PSYCH: AAOx3  NEUROLOGY: non-focal  SKIN: No rashes or lesions    LABS:                    RESPIRATORY  VENT:    ABG:     VBG:     RADIOLOGY & ADDITIONAL TESTS:    Imaging Personally Reviewed:    Consultant(s) Notes Reviewed:      Care Discussed with Consultants/Other Providers:

## 2017-09-18 NOTE — PROGRESS NOTE ADULT - PROBLEM SELECTOR PLAN 3
hgba1c 7.9. Now on humalog 5 which was increased from 3 units yesterday. FS now better controlled  - cw lantus 8 units and humalog 5 units TID  -cw PASTORA and check FS TID and qhs.

## 2017-09-19 LAB
ANION GAP SERPL CALC-SCNC: 14 MMOL/L — SIGNIFICANT CHANGE UP (ref 5–17)
BUN SERPL-MCNC: 23 MG/DL — SIGNIFICANT CHANGE UP (ref 7–23)
CALCIUM SERPL-MCNC: 9.2 MG/DL — SIGNIFICANT CHANGE UP (ref 8.4–10.5)
CHLORIDE SERPL-SCNC: 101 MMOL/L — SIGNIFICANT CHANGE UP (ref 96–108)
CO2 SERPL-SCNC: 25 MMOL/L — SIGNIFICANT CHANGE UP (ref 22–31)
CREAT SERPL-MCNC: 0.98 MG/DL — SIGNIFICANT CHANGE UP (ref 0.5–1.3)
GLUCOSE SERPL-MCNC: 258 MG/DL — HIGH (ref 70–99)
HCT VFR BLD CALC: 38.1 % — SIGNIFICANT CHANGE UP (ref 34.5–45)
HGB BLD-MCNC: 13 G/DL — SIGNIFICANT CHANGE UP (ref 11.5–15.5)
MCHC RBC-ENTMCNC: 34 GM/DL — SIGNIFICANT CHANGE UP (ref 32–36)
MCHC RBC-ENTMCNC: 34.5 PG — HIGH (ref 27–34)
MCV RBC AUTO: 101 FL — HIGH (ref 80–100)
PLATELET # BLD AUTO: 127 K/UL — LOW (ref 150–400)
POTASSIUM SERPL-MCNC: 4.5 MMOL/L — SIGNIFICANT CHANGE UP (ref 3.5–5.3)
POTASSIUM SERPL-SCNC: 4.5 MMOL/L — SIGNIFICANT CHANGE UP (ref 3.5–5.3)
RBC # BLD: 3.77 M/UL — LOW (ref 3.8–5.2)
RBC # FLD: 12.4 % — SIGNIFICANT CHANGE UP (ref 10.3–14.5)
SODIUM SERPL-SCNC: 140 MMOL/L — SIGNIFICANT CHANGE UP (ref 135–145)
WBC # BLD: 11.8 K/UL — HIGH (ref 3.8–10.5)
WBC # FLD AUTO: 11.8 K/UL — HIGH (ref 3.8–10.5)

## 2017-09-19 PROCEDURE — 99233 SBSQ HOSP IP/OBS HIGH 50: CPT | Mod: GC

## 2017-09-19 RX ADMIN — Medication 1: at 18:27

## 2017-09-19 RX ADMIN — Medication 3 MILLILITER(S): at 22:19

## 2017-09-19 RX ADMIN — FAMOTIDINE 10 MILLIGRAM(S): 10 INJECTION INTRAVENOUS at 12:51

## 2017-09-19 RX ADMIN — Medication 1 SPRAY(S): at 05:50

## 2017-09-19 RX ADMIN — Medication 3 MILLILITER(S): at 05:42

## 2017-09-19 RX ADMIN — Medication 40 MILLIGRAM(S): at 05:50

## 2017-09-19 RX ADMIN — MONTELUKAST 10 MILLIGRAM(S): 4 TABLET, CHEWABLE ORAL at 12:52

## 2017-09-19 RX ADMIN — Medication 1 SPRAY(S): at 14:39

## 2017-09-19 RX ADMIN — BUDESONIDE AND FORMOTEROL FUMARATE DIHYDRATE 2 PUFF(S): 160; 4.5 AEROSOL RESPIRATORY (INHALATION) at 05:44

## 2017-09-19 RX ADMIN — Medication 3: at 12:49

## 2017-09-19 RX ADMIN — ENOXAPARIN SODIUM 40 MILLIGRAM(S): 100 INJECTION SUBCUTANEOUS at 05:49

## 2017-09-19 RX ADMIN — Medication 3 MILLILITER(S): at 14:25

## 2017-09-19 RX ADMIN — Medication 1 SPRAY(S): at 21:35

## 2017-09-19 RX ADMIN — LORATADINE 10 MILLIGRAM(S): 10 TABLET ORAL at 12:53

## 2017-09-19 RX ADMIN — Medication 3 MILLILITER(S): at 10:52

## 2017-09-19 RX ADMIN — Medication 81 MILLIGRAM(S): at 12:51

## 2017-09-19 RX ADMIN — Medication 5 UNIT(S): at 18:27

## 2017-09-19 RX ADMIN — Medication 1 SPRAY(S): at 18:28

## 2017-09-19 RX ADMIN — Medication 5 UNIT(S): at 12:50

## 2017-09-19 RX ADMIN — Medication 10 MILLIGRAM(S): at 18:26

## 2017-09-19 RX ADMIN — Medication 5 UNIT(S): at 10:09

## 2017-09-19 RX ADMIN — Medication 10 MILLIGRAM(S): at 05:50

## 2017-09-19 RX ADMIN — Medication 1: at 10:12

## 2017-09-19 RX ADMIN — ATORVASTATIN CALCIUM 20 MILLIGRAM(S): 80 TABLET, FILM COATED ORAL at 21:36

## 2017-09-19 NOTE — PROGRESS NOTE ADULT - PROBLEM SELECTOR PLAN 2
hgba1c 7.9. Pt did have one FS of 340s yesterday afternoon. But all other FS controlled.   - cw lantus 8 units and humalog 5 units TID. If afternoon FS remain elevated, will increase humalog.   -cw PASTORA and check FS TID and qhs.

## 2017-09-19 NOTE — PROGRESS NOTE ADULT - PROBLEM SELECTOR PLAN 1
Asthma vs COPD exacerbation. Symbicort dose was increased yesterday along with duonebs to q4. Still wheezing but mildly improved?  -will c/w duoneb q4h, symbicort, singulair.   -taper prednisone, now on 40mg daily.   -Flonase for post nasal drip  -robitussin for cough  - daily peak flow. This morning it was 130 pretreatment and 150 post.   Patient refuses IV steroid due to report of hallucinations

## 2017-09-19 NOTE — PROGRESS NOTE ADULT - ASSESSMENT
72F w/ asthma (childhood onset, intubatedx1 2016), NICM w/ HFrEF s/p AICD, DM2 on insulin, HTN, HLD presents to [facility] for SOB/wheezing likely 2/2 asthma vs. COPD exacerbation 2/2 URI vs. allergy.

## 2017-09-19 NOTE — PROGRESS NOTE ADULT - PROBLEM SELECTOR PLAN 5
Pt with history HFrEF (grossly mild) s/p AICD and diastolic dysfunction. BNP NOT elevated. Pt also seems euvolemic and cxray showed clear lungs.   -LE swelling improved with compression stockings.  - c/w asa, atorvastatin, enalapril  -lasix prn if legs are swollen  -does not take metoprolol bc of asthma.  -Informed Dr. Hobson, pts cardiologist of patients admission. Will reconsult to r/o cardiac reason for patient's SOB.

## 2017-09-19 NOTE — PROGRESS NOTE ADULT - SUBJECTIVE AND OBJECTIVE BOX
Patient is a 72y old  Female who presents with a chief complaint of 72F w/ asthma exacerbation (15 Sep 2017 10:00)      SUBJECTIVE / OVERNIGHT EVENTS: No acute events overnight. No fever or chills, but still complains of this wheeziness and SOB on exertion, including when she takes a shower and when she walks to the bathroom. Denies chest pain and palpitations associated with the SOB. Thinks she is a little better today.     CONSTITUTIONAL:  No weight loss, fever, chills, weakness or fatigue.  HEENT:  Eyes:  No visual loss, blurred vision, double vision No hearing loss, sneezing, congestion, runny nose or sore throat.  SKIN:  No rash or itching.  CARDIOVASCULAR:  No chest pain, chest pressure or chest discomfort. No palpitations or edema.  RESPIRATORY:  +SOB on exertion  GASTROINTESTINAL:  No anorexia, nausea, vomiting or diarrhea. No abdominal pain or blood.  GENITOURINARY:  Denies hematuria, dysuria.   NEUROLOGICAL:  No headache, dizziness, syncope, paralysis, ataxia, numbness or tingling in the extremities. No change in bowel or bladder control.  MUSCULOSKELETAL:  No muscle, back pain, joint pain or stiffness.  HEMATOLOGIC:  No anemia, bleeding or bruising.  LYMPHATICS:  No enlarged nodes. No history of splenectomy.  PSYCHIATRIC:  No history of depression or anxiety.  ENDOCRINOLOGIC:  No reports of sweating, cold or heat intolerance. No polyuria or polydipsia.  ALLERGIES:  No history of asthma, hives, eczema or rhinitis.    MEDICATIONS  (STANDING):  insulin lispro (HumaLOG) corrective regimen sliding scale   SubCutaneous three times a day before meals  insulin lispro (HumaLOG) corrective regimen sliding scale   SubCutaneous at bedtime  dextrose 5%. 1000 milliLiter(s) (50 mL/Hr) IV Continuous <Continuous>  dextrose 50% Injectable 12.5 Gram(s) IV Push once  insulin glargine Injectable (LANTUS) 8 Unit(s) SubCutaneous at bedtime  dextrose 50% Injectable 25 Gram(s) IV Push once  dextrose 50% Injectable 25 Gram(s) IV Push once  aspirin enteric coated 81 milliGRAM(s) Oral daily  enalapril 10 milliGRAM(s) Oral two times a day  loratadine 10 milliGRAM(s) Oral daily  atorvastatin 20 milliGRAM(s) Oral at bedtime  famotidine    Tablet 10 milliGRAM(s) Oral daily  montelukast 10 milliGRAM(s) Oral daily  enoxaparin Injectable 40 milliGRAM(s) SubCutaneous every 24 hours  docusate sodium 100 milliGRAM(s) Oral three times a day  senna 2 Tablet(s) Oral at bedtime  sodium chloride 0.65% Nasal 1 Spray(s) Both Nostrils three times a day  fluticasone propionate 50 MICROgram(s)/spray Nasal Spray 1 Spray(s) Both Nostrils two times a day  predniSONE   Tablet 40 milliGRAM(s) Oral daily  insulin lispro Injectable (HumaLOG) 5 Unit(s) SubCutaneous three times a day before meals  buDESOnide 160 MICROgram(s)/formoterol 4.5 MICROgram(s) Inhaler 2 Puff(s) Inhalation two times a day  ALBUTerol/ipratropium for Nebulization 3 milliLiter(s) Nebulizer every 4 hours    MEDICATIONS  (PRN):  dextrose Gel 1 Dose(s) Oral once PRN Blood Glucose LESS THAN 70 milliGRAM(s)/deciliter  glucagon  Injectable 1 milliGRAM(s) IntraMuscular once PRN Glucose LESS THAN 70 milligrams/deciliter  polyethylene glycol 3350 17 Gram(s) Oral daily PRN Constipation  guaiFENesin   Syrup  (Sugar-Free) 200 milliGRAM(s) Oral every 6 hours PRN Cough      I&O's Summary    18 Sep 2017 07:01  -  19 Sep 2017 07:00  --------------------------------------------------------  IN: 960 mL / OUT: 0 mL / NET: 960 mL        Vital Signs Last 24 Hrs  T(C): 36.5 (19 Sep 2017 04:46), Max: 36.8 (18 Sep 2017 20:02)  T(F): 97.7 (19 Sep 2017 04:46), Max: 98.3 (18 Sep 2017 20:02)  HR: 88 (19 Sep 2017 05:47) (73 - 90)  BP: 123/76 (19 Sep 2017 04:46) (123/76 - 139/76)  BP(mean): --  RR: 18 (19 Sep 2017 04:46) (18 - 18)  SpO2: 98% (19 Sep 2017 05:47) (95% - 98%)  CAPILLARY BLOOD GLUCOSE  148 (18 Sep 2017 22:30)  149 (18 Sep 2017 17:45)  344 (18 Sep 2017 12:20)  162 (18 Sep 2017 08:22)          PHYSICAL EXAM:  GENERAL: NAD, well-developed  HEAD:  Atraumatic, Normocephalic  EYES: EOMI, PERRLA, conjunctiva and sclera clear  NECK: Supple, No JVD  CHEST/LUNG: mild diffuse end expiratory wheeze.   HEART: Regular rate and rhythm; No murmurs, rubs, or gallops  ABDOMEN: Soft, Nontender, Nondistended; Bowel sounds present  EXTREMITIES:  2+ Peripheral Pulses, compression stockings in place.   PSYCH: AAOx3  NEUROLOGY: non-focal  SKIN: No rashes or lesions    LABS:                    RESPIRATORY  VENT:    ABG:     VBG:     RADIOLOGY & ADDITIONAL TESTS:    Imaging Personally Reviewed: Cxray: 9/17 no focal consolidation    Consultant(s) Notes Reviewed:      Care Discussed with Consultants/Other Providers:

## 2017-09-20 DIAGNOSIS — I50.42 CHRONIC COMBINED SYSTOLIC (CONGESTIVE) AND DIASTOLIC (CONGESTIVE) HEART FAILURE: ICD-10-CM

## 2017-09-20 DIAGNOSIS — R06.09 OTHER FORMS OF DYSPNEA: ICD-10-CM

## 2017-09-20 PROCEDURE — 99223 1ST HOSP IP/OBS HIGH 75: CPT | Mod: GC

## 2017-09-20 PROCEDURE — 99233 SBSQ HOSP IP/OBS HIGH 50: CPT | Mod: GC

## 2017-09-20 RX ORDER — INSULIN LISPRO 100/ML
5 VIAL (ML) SUBCUTANEOUS
Qty: 0 | Refills: 0 | Status: DISCONTINUED | OUTPATIENT
Start: 2017-09-20 | End: 2017-09-21

## 2017-09-20 RX ORDER — INSULIN LISPRO 100/ML
8 VIAL (ML) SUBCUTANEOUS
Qty: 0 | Refills: 0 | Status: DISCONTINUED | OUTPATIENT
Start: 2017-09-20 | End: 2017-09-21

## 2017-09-20 RX ORDER — IPRATROPIUM/ALBUTEROL SULFATE 18-103MCG
3 AEROSOL WITH ADAPTER (GRAM) INHALATION EVERY 6 HOURS
Qty: 0 | Refills: 0 | Status: DISCONTINUED | OUTPATIENT
Start: 2017-09-20 | End: 2017-09-21

## 2017-09-20 RX ADMIN — INSULIN GLARGINE 8 UNIT(S): 100 INJECTION, SOLUTION SUBCUTANEOUS at 22:23

## 2017-09-20 RX ADMIN — Medication 3 MILLILITER(S): at 05:18

## 2017-09-20 RX ADMIN — Medication 5 UNIT(S): at 12:35

## 2017-09-20 RX ADMIN — Medication 3 MILLILITER(S): at 10:39

## 2017-09-20 RX ADMIN — Medication 1 SPRAY(S): at 05:47

## 2017-09-20 RX ADMIN — Medication 5 UNIT(S): at 18:00

## 2017-09-20 RX ADMIN — Medication 1 SPRAY(S): at 05:46

## 2017-09-20 RX ADMIN — ENOXAPARIN SODIUM 40 MILLIGRAM(S): 100 INJECTION SUBCUTANEOUS at 05:47

## 2017-09-20 RX ADMIN — Medication 1 SPRAY(S): at 22:23

## 2017-09-20 RX ADMIN — Medication 40 MILLIGRAM(S): at 05:48

## 2017-09-20 RX ADMIN — Medication 200 MILLIGRAM(S): at 10:44

## 2017-09-20 RX ADMIN — ATORVASTATIN CALCIUM 20 MILLIGRAM(S): 80 TABLET, FILM COATED ORAL at 22:23

## 2017-09-20 RX ADMIN — FAMOTIDINE 10 MILLIGRAM(S): 10 INJECTION INTRAVENOUS at 12:57

## 2017-09-20 RX ADMIN — Medication 1: at 18:00

## 2017-09-20 RX ADMIN — Medication 10 MILLIGRAM(S): at 05:48

## 2017-09-20 RX ADMIN — Medication 1 SPRAY(S): at 13:52

## 2017-09-20 RX ADMIN — BUDESONIDE AND FORMOTEROL FUMARATE DIHYDRATE 2 PUFF(S): 160; 4.5 AEROSOL RESPIRATORY (INHALATION) at 05:18

## 2017-09-20 RX ADMIN — Medication 3: at 12:35

## 2017-09-20 RX ADMIN — MONTELUKAST 10 MILLIGRAM(S): 4 TABLET, CHEWABLE ORAL at 12:34

## 2017-09-20 RX ADMIN — Medication 1: at 09:22

## 2017-09-20 RX ADMIN — BUDESONIDE AND FORMOTEROL FUMARATE DIHYDRATE 2 PUFF(S): 160; 4.5 AEROSOL RESPIRATORY (INHALATION) at 17:45

## 2017-09-20 RX ADMIN — Medication 10 MILLIGRAM(S): at 17:59

## 2017-09-20 RX ADMIN — Medication 8 UNIT(S): at 09:24

## 2017-09-20 RX ADMIN — LORATADINE 10 MILLIGRAM(S): 10 TABLET ORAL at 12:34

## 2017-09-20 RX ADMIN — Medication 81 MILLIGRAM(S): at 12:58

## 2017-09-20 RX ADMIN — Medication 1 SPRAY(S): at 17:59

## 2017-09-20 NOTE — CONSULT NOTE ADULT - ASSESSMENT
This is a 72 year old   female with a history of a nonischemic cardiomyopathy causing heart failure with reduced ejection fraction, not on beta blockers due to severe asthma, presenting with  a three week history of wheezing, not improving despite one week of outpt steroids and one week on inpatient steroids.     Currently on exam, she does not appear to be in decompensated left sided heart failure, however her refractory wheezing and history of a prior prolonged intubation and tracheostomy and hoarse voice raises concerns about vocal chord dysfunction.  From a heart failure standpoint, we will attempt to optimize her HFrEF. This is a 72 year old   female with a history of a nonischemic nondilated cardiomyopathy causing heart failure with mildly reduced ejection fraction, not on beta blockers due to severe asthma, presenting with  a three week history of wheezing, not improving despite one week of outpt steroids and one week on inpatient steroids.     Currently on exam, she does not appear to be in decompensated left sided heart failure, however her refractory wheezing and history of a prior prolonged intubation and tracheostomy and hoarse voice raises concerns about vocal chord dysfunction.  From a heart failure standpoint, we will attempt to optimize her HFrEF.

## 2017-09-20 NOTE — CONSULT NOTE ADULT - SUBJECTIVE AND OBJECTIVE BOX
HISTORY OF PRESENT ILLNESS:  72F w/ asthma (childhood onset, intubatedx1 ), NICM w/ HFrEF s/p AICD, DM2 on insulin.     She was in her usoh until two weeks prior to admission, when she started noteing increased wheezing, She saw her private pulmonologist, who started her on a week prednisone course. She says her sypmtoms improved mildly, but in the few days after stopping, she had wortstening dyspnea and came to the ER here on .    At bedside, patient sitting in chair. Has no complaints. Denies chest pain, , dizziness, syncope, presyncope, orthopnea, edema, PND.     Her history is notable for a prolonged hospitalization 1 year ago for respiratory failure, requiring prolonged intubation, and tracheostomy, which was later reversed. She has had longstanding hoarseness since then.    Allergies    Coreg (Other)  digoxin (Other; Short breath (Mild to Mod))  penicillins (Hives)  Solu-Medrol (Other)    Intolerances    	    Home Medications:   * Patient Currently Takes Medications as of 13-Sep-2017 03:20 documented in Structured Notes  · 	furosemide 40 mg oral tablet: 1 tab(s) orally once a day, As Needed for leg swelling  · 	aspirin 81 mg oral delayed release tablet: 1 tab(s) orally once a day  · 	atorvastatin 20 mg oral tablet: 1 tab(s) orally once a day (at bedtime)  · 	enalapril 10 mg oral tablet: 1 tab(s) orally 2 times a day  · 	loratadine 10 mg oral tablet: 1 tab(s) orally once a day  · 	montelukast 10 mg oral tablet: 1 tab(s) orally once a day  · 	albuterol-ipratropium 2.5 mg-0.5 mg/3 mL inhalation solution: 3 milliliter(s) inhaled every 6 hours  · 	Lantus 100 units/mL subcutaneous solution: 8 unit(s) subcutaneous once a day  · 	TheraTears ophthalmic solution: 1 drop(s) in each eye once a day  · 	famotidine 10 mg oral tablet: 1 tab(s) orally once  · 	magnesium oxide 500 mg oral tablet: 1 tab(s) orally 3 times a day  · 	potassium chloride 20 mEq oral powder for reconstitution: 1 each orally once a day  · 	Ventolin HFA 90 mcg/inh inhalation aerosol: 2 puff(s) inhaled 4 times a day, As Needed  · 	Dulera 100 mcg-5 mcg/inh inhalation aerosol: 2 puff(s) inhaled 2 times a day      MEDICATIONS:  aspirin enteric coated 81 milliGRAM(s) Oral daily  enalapril 10 milliGRAM(s) Oral two times a day  enoxaparin Injectable 40 milliGRAM(s) SubCutaneous every 24 hours      loratadine 10 milliGRAM(s) Oral daily  montelukast 10 milliGRAM(s) Oral daily  guaiFENesin   Syrup  (Sugar-Free) 200 milliGRAM(s) Oral every 6 hours PRN  buDESOnide 160 MICROgram(s)/formoterol 4.5 MICROgram(s) Inhaler 2 Puff(s) Inhalation two times a day  ALBUTerol/ipratropium for Nebulization 3 milliLiter(s) Nebulizer every 6 hours PRN      famotidine    Tablet 10 milliGRAM(s) Oral daily    insulin lispro (HumaLOG) corrective regimen sliding scale   SubCutaneous three times a day before meals  insulin lispro (HumaLOG) corrective regimen sliding scale   SubCutaneous at bedtime  dextrose Gel 1 Dose(s) Oral once PRN  dextrose 50% Injectable 12.5 Gram(s) IV Push once  insulin glargine Injectable (LANTUS) 8 Unit(s) SubCutaneous at bedtime  dextrose 50% Injectable 25 Gram(s) IV Push once  dextrose 50% Injectable 25 Gram(s) IV Push once  glucagon  Injectable 1 milliGRAM(s) IntraMuscular once PRN  atorvastatin 20 milliGRAM(s) Oral at bedtime  insulin lispro Injectable (HumaLOG) 5 Unit(s) SubCutaneous before lunch  insulin lispro Injectable (HumaLOG) 5 Unit(s) SubCutaneous before dinner  insulin lispro Injectable (HumaLOG) 8 Unit(s) SubCutaneous before breakfast  predniSONE   Tablet 20 milliGRAM(s) Oral daily    dextrose 5%. 1000 milliLiter(s) IV Continuous <Continuous>  sodium chloride 0.65% Nasal 1 Spray(s) Both Nostrils three times a day  fluticasone propionate 50 MICROgram(s)/spray Nasal Spray 1 Spray(s) Both Nostrils two times a day      PAST MEDICAL & SURGICAL HISTORY:  Diverticulitis  Cardiomyopathy  Kidney stone  COPD (chronic obstructive pulmonary disease)  Cardiac Pacemaker  HTN - Hypertension  Gout  Diabetes  Congestive Heart Failure  Asthma  S/P cholecystectomy  AICD (Automatic Cardioverter/Defibrillator) Present: inserted in Aug, 2008. Due for battery change in 1 month. ( [employer]) . Inserted by Dr Duffy  S/P Cholecystectomy      FAMILY HISTORY:  No pertinent family history in first degree relatives      SOCIAL HISTORY:    x Non-smoker  [ ] Smoker  [ ] Alcohol      REVIEW OF SYSTEMS:  General: no fatigue/malaise, weight loss/gain.  Skin: no rashes.  Ophthalmologic: no blurred vision, no loss of vision. 	  ENT: no sore throat, rhinorrhea, sinus congestion.  Respiratory: no SOB, cough or wheeze.  Gastrointestinal:  no N/V/D, no melena/hematemesis/hematochezia.  Genitourinary: no dysuria/hesitancy or hematuria.  Musculoskeletal: no myalgias or arthralgias.  Neurological: no changes in vision or hearing, no lightheadedness/dizziness, no syncope/near syncope	  Psychiatric: no unusual stress/anxiety.   Hematology/Lymphatics: no unusual bleeding, bruising and no lymphadenopathy.  Endocrine: no unusual thirst.   All others negative except as stated above and in HPI.    PHYSICAL EXAM:  T(C): 36.5 (17 @ 14:22), Max: 36.8 (17 @ 22:14)  HR: 88 (17 @ 17:45) (76 - 98)  BP: 129/78 (17 @ 14:22) (129/78 - 159/86)  RR: 18 (17 @ 14:22) (18 - 18)  SpO2: 97% (17 @ 14:22) (96% - 98%)  Wt(kg): --  I&O's Summary      Appearance: Normal	  HEENT:   hoarse voice, no stridor or inspiratory wheeze  Lymphatic: No lymphadenopathy  Cardiovascular: Normal S1 S2, trace  JVD, No murmurs, trace edema  Respiratory: b/l loud wheezing thorughout, expiratory,  Psychiatry: A & O x 3, Mood & affect appropriate  Gastrointestinal:  Soft, Non-tender, + BS	  Skin: No rashes, No ecchymoses, No cyanosis	  Neurologic: Non-focal  Extremities: Normal range of motion, No clubbing, cyanosis or edema  Vascular: Peripheral pulses palpable 2+ bilaterally        LABS:	 	    CBC Full  -  ( 19 Sep 2017 10:59 )  WBC Count : 11.8 K/uL  Hemoglobin : 13.0 g/dL  Hematocrit : 38.1 %  Platelet Count - Automated : 127 K/uL  Mean Cell Volume : 101.0 fl  Mean Cell Hemoglobin : 34.5 pg  Mean Cell Hemoglobin Concentration : 34.0 gm/dL  Auto Neutrophil # : x  Auto Lymphocyte # : x  Auto Monocyte # : x  Auto Eosinophil # : x  Auto Basophil # : x  Auto Neutrophil % : x  Auto Lymphocyte % : x  Auto Monocyte % : x  Auto Eosinophil % : x  Auto Basophil % : x    09-19    140  |  101  |  23  ----------------------------<  258<H>  4.5   |  25  |  0.98    Ca    9.2      19 Sep 2017 10:59        proBNP:   Lipid Profile:   HgA1c:   TSH:       CARDIAC MARKERS:            TELEMETRY: 	  not on telemetry    PREVIOUS DIAGNOSTIC TESTING:    [ ] Echocardiogram: < from: Transthoracic Echocardiogram (17 @ 11:22) >    Patient name: ANDRE BARKER  YOB: 1945   Age: 72 (F)   MR#: [id number]  Study Date: 2017  Location: O/PSonographer: Merlyn Marquez [facility]  Study quality: Technically difficult  Referring Physician:  Blood Pressure: 130/88 mmHg  Height: 157 cm  Weight: 79 kg  BSA: 1.8 m2  ------------------------------------------------------------------------  PROCEDURE: Transthoracic echocardiogram with 2-D, M-Mode  and complete spectral and color flow Doppler.  INDICATION: Chronic systolic (congestive) heart failure  (I50.22)  ------------------------------------------------------------------------  Dimensions:    Normal Values:  LA:     4.7    2.0 - 4.0 cm  Ao:     2.5    2.0 - 3.8 cm  SEPTUM: 0.8    0.6 - 1.2 cm  PWT:    0.8    0.6 - 1.1 cm  LVIDd:  5.3    3.0 - 5.6 cm  LVIDs:         1.8 - 4.0 cm  Derived variables:  LVMI: 83 g/m2  RWT: 0.30  Doppler Peak Velocity (m/sec): AoV=1.8  ------------------------------------------------------------------------  Observations:  Mitral Valve: Mild mitral annular calcification, otherwise  normal mitral valve. Minimal mitral regurgitation.  Aortic Valve/Aorta: Aortic valve not well visualized. Peak  transaortic valve gradient equals 14 mm Hg, mean  transaortic valve gradient equals 9 mm Hg. No aortic valve  regurgitation seen. Peak left ventricular outflow tract  gradient equals 2 mm Hg.  Aortic Root: 2.5 cm.  Left Atrium: Normal left atrium.  LA volume index = 27  cc/m2.  Left Ventricle: Endocardium not well visualized; grossly  mild left ventricular systolic dysfunction. The basal  inferior, basal inferoseptal, and inferolateral walls  appear hypokinetic. Paradoxical septal motion consistent  with paced rhythm. Repeat imaing with intravenous echo  contrast could be performed ifclinically indicated. Normal  left ventricular internal dimensions and wall thicknesses.  Mild diastolic dysfunction (Stage I).  Right Heart: Right atrium not well visualized, probably  normal. The right ventricle is not well visualized; grossly  normal right ventricular systolic function. A device wire  is noted in the right heart. Normal tricuspid valve.  Minimal tricuspid regurgitation. Normal pulmonic valve.  Pericardium/Pleura: Normal pericardium with no pericardial  effusion.  Hemodynamic: Estimated right atrial pressure is 8 mm Hg.  Estimated right ventricular systolic pressure equals 37 mm  Hg, assuming right atrial pressure equals 8 mm Hg,  consistent with borderline pulmonary hypertension.  ------------------------------------------------------------------------  Conclusions:  1. Mild mitral annular calcification, otherwise normal  mitral valve. Minimal mitral regurgitation.  2. Aortic valve not well visualized. No aortic valve  regurgitation seen.  3. Endocardium not well visualized; grossly mild left  ventricular systolic dysfunction. The basal inferior, basal  inferoseptal, and inferolateral walls appear hypokinetic.  Paradoxical septal motion consistent with paced rhythm.  Repeat imaing with intravenous echo contrast could be  performed if clinically indicated.  4. Mild diastolic dysfunction (Stage I).  5. The right ventricle is not well visualized; grossly  normal right ventricular systolic function. A device wire  is noted in the right heart.  *** Compared with echocardiogram of 2016, overall LV  systolic function appears improved. LV systolic function  was grossly moderate to severely reduced on the prior  study.  ------------------------------------------------------------------------  Confirmed on  2017 - 15:07:05 by aJsmyn Holt M.D.  ------------------------------------------------------------------------    < end of copied text >    [ ]  Catheterization: < from: Cardiac Cath Lab (09 @ 13:39) >    [facility]  Department of Cardiology  [address] [place], [place] [id number] [phone]  Cath Lab Report -- Comprehensive Report  Patient: ANDRE BARKER  Study date: 2009  Account number: [id number]  MR number: [id number]  : 1945  Gender: Female  Race:  Amer  Case Physician(s):  Grover Chambers M.D.  Fellow:  Vicky Jang M.D.  Referring Physician:  Joan Akins M.D.  Indications: Angina/MI: atypical chest pain.  History: History includes nonischemic cardiomyopathy. There were no  significant risk factors. Prior cardiovascular procedures: ICD  implantation.  Procedure:  Left coronary angiography.  Right coronary angiography.  Left heart catheterization with ventriculography.  Hemostasis with Angioseal.  Right heart catheterization.  Technique: The risks and alternatives of the procedures and conscious  sedation were explained to the patient and informed consent was obtained.  Cardiac catheterization performed electively.  Right femoral artery access. A 5FR SHEATH PINNACLE was inserted in the  vessel. Left coronary artery angiography. The vessel was injected  utilizing a 5FR FL4.0 EXPO catheter. Right coronary artery angiography.  The vessel was injected utilizing a 5FR FR4.0EXPO catheter. Left heart  catheterization. Ventriculography was performed. A 5FR  EXPO  catheter was utilized. Hemostasis with Angioseal (using 6FR VIP  ANGIO-SEAL). Right femoral vein access. A 7FR SHEATH PINNACLE was inserted  in the vessel. Right heart catheterization. The procedure was performed  utilizing a 7FR SWAN ALEXY catheter. Contrast given: Omnipaque 129 ml.  Fluoroscopy time: 2 min.  Medications given: Diazepam, 6 mg, IV. Lidocaine 1 percent, SC.  Ventricles: Global left ventricular function was severely depressed. EF  calculated by contrast ventriculography was 34 %. The left ventricle was  moderately dilated.  Coronary vessels: The coronary circulation is right dominant.  LM:      LM: The vessel was normal sized. Angiography showed no evidence of  disease.  LAD:      LAD: The vessel was normal sized. Angiography showed no evidence  of disease.  CX:      Circumflex: The vessel was normal sized. Angiography showed no  evidence of disease.  RCA:      RCA: The vessel wasnormal sized. Angiography showed no evidence  of disease.  Complications: There were no complications.  Summary:  Cardiac structures: Global left ventricular function was severely  depressed. EF calculated by contrast ventriculography was 34 %. The left  ventricle was moderately dilated.  Recommendations:  The patient should continue with the present medications.  Prepared and signed by  Grover Chambers M.D.  Signed 2009 14:08:53  Hemodynamic tables  Pressures:  Baseline/ Room Air  Pressures: - HR: 96  Pressures:  - Rhythm:  Pressures:  -- Aortic Pressure (S/D/M): 131/73/91  Pressures:  -- Left Ventricle (s/edp): 140/17/--  Pressures:  Post Angio  Pressures:  - HR: 94  Pressures:  - Rhythm:  Pressures:  -- Aortic Pressure (S/D/M): 141/71/88  Pressures:  -- Left Ventricle (s/edp): 140/18/--  Pressures:  -- Pulmonary Artery (S/D/M): 36/17/25  Pressures:  -- Pulmonary Capillary Wedge: 21//17  Pressures:  -- Right Atrium (a/v/M): 15/14/10  Pressures:  -- Right Ventricle (s/edp): 38/14/--  O2 Sats:  Post Angio  O2 Sats:  - HR: 94  O2 Sats:  - Rhythm:  O2 Sats:  -- AO: 12.7/94.6/16.34  O2 Sats:  -- PA: 12/73.3/11.96  Outputs:  Baseline/ Room Air  Outputs:  -- CALCULATIONS[age]  Outputs:  -- CALCULATIONS: Body Surface Area: 1.86  Outputs:  -- CALCULATIONS: Height in cm: 157.00  Outputs:  -- CALCULATIONS: Sex: Female  Outputs:  -- CALCULATIONS: Weight in k.70  Outputs:  Post Angio  Outputs:  -- OUTPUTS: Blood Oxygen Difference: 4.38  Outputs:  -- OUTPUTS: CO byFick: 5.67  Outputs:  -- OUTPUTS: Cipriano cardiac index: 3.04  Outputs:  -- OUTPUTS: O2 consumption: 248.00  Outputs:  -- OUTPUTS: Vo2 Indexed: 133.21  Outputs:  -- RESISTANCES: Left ventricular stroke work: 55.83  Outputs:  -- RESISTANCES: Left Ventricular Stroke Work index: 29.99  Outputs:  -- RESISTANCES: Pulmonary vascular index (dsc): 210.22  Outputs:  -- RESISTANCES: Pulmonary vascular index (Wood Units): 2.63  Outputs:  -- RESISTANCES: Pulmonary vascular resistance (dsc): 112.92  Outputs:  --RESISTANCES: Pulmonary vascular resistance (Wood Units): 1.41  Outputs:  -- RESISTANCES: PVR_SVR Ratio: 0.10  Outputs:  -- RESISTANCES: Right ventricular stroke work: 13.14  Outputs:  -- RESISTANCES: Right ventricular stroke work index: 7.06  Outputs:  -- RESISTANCES: Systemic vascular index (dsc): 2049.68  Outputs:  -- RESISTANCES: Systemic vascular index (Wood Units): 25.63  Outputs:  -- RESISTANCES: Systemic vascular resistance (dsc): 1100.97  Outputs:  -- RESISTANCES: Systemic vascular resistance (Wood Units): 13.77  Outputs:  -- RESISTANCES: Total pulmonary index (dsc): 656.95  Outputs:  -- RESISTANCES: Total pulmonary index (Wood Units): 8.21  Outputs:  -- RESISTANCES: Total pulmonary resistance (dsc): 352.88  Outputs:  -- RESISTANCES: Total pulmonary resistance (Wood Units): 4.41  Outputs:  -- RESISTANCES: Total vascular index (Wood Units): 28.91  Outputs:  -- RESISTANCES: Total vascular resistance (dsc): 1242.12  Outputs:  -- RESISTANCES: Total vascular resistance (Wood Units): 15.53  Outputs:  -- RESISTANCES: Total vascular resistance index (dsc): 2312.46  Outputs:  -- RESISTANCES: TPR_TVR Ratio: 0.28  Outputs:  -- SHUNTS: Qs Indexed: 3.04  Outputs:  -- SHUNTS: Systemic flow: 5.67    < end of copied text >    [ ] Stress Test: HISTORY OF PRESENT ILLNESS:  72F w/ asthma (childhood onset, intubatedx1 ), NICM w/ HFrEF s/p AICD, DM2 on insulin.     She was in her usoh until two weeks prior to admission, when she started noteing increased wheezing, She saw her private pulmonologist, who started her on a week prednisone course. She says her sypmtoms improved mildly, but in the few days after stopping, she had wortstening dyspnea and came to the ER here on .    At bedside, patient sitting in chair. Has no complaints. Denies chest pain, , dizziness, syncope, presyncope, orthopnea, edema, PND.     Her history is notable for a prolonged hospitalization 1 year ago for respiratory failure, requiring prolonged intubation, and tracheostomy, which was later reversed. She has had longstanding hoarseness since then.    Allergies    Coreg (Other)  digoxin (Other; Short breath (Mild to Mod))  penicillins (Hives)  Solu-Medrol (Other)    Intolerances  	    Home Medications:   * Patient Currently Takes Medications as of 13-Sep-2017 03:20 documented in Structured Notes  · 	furosemide 40 mg oral tablet: 1 tab(s) orally once a day, As Needed for leg swelling  · 	aspirin 81 mg oral delayed release tablet: 1 tab(s) orally once a day  · 	atorvastatin 20 mg oral tablet: 1 tab(s) orally once a day (at bedtime)  · 	enalapril 10 mg oral tablet: 1 tab(s) orally 2 times a day  · 	loratadine 10 mg oral tablet: 1 tab(s) orally once a day  · 	montelukast 10 mg oral tablet: 1 tab(s) orally once a day  · 	albuterol-ipratropium 2.5 mg-0.5 mg/3 mL inhalation solution: 3 milliliter(s) inhaled every 6 hours  · 	Lantus 100 units/mL subcutaneous solution: 8 unit(s) subcutaneous once a day  · 	TheraTears ophthalmic solution: 1 drop(s) in each eye once a day  · 	famotidine 10 mg oral tablet: 1 tab(s) orally once  · 	magnesium oxide 500 mg oral tablet: 1 tab(s) orally 3 times a day  · 	potassium chloride 20 mEq oral powder for reconstitution: 1 each orally once a day  · 	Ventolin HFA 90 mcg/inh inhalation aerosol: 2 puff(s) inhaled 4 times a day, As Needed  · 	Dulera 100 mcg-5 mcg/inh inhalation aerosol: 2 puff(s) inhaled 2 times a day    MEDICATIONS:  aspirin enteric coated 81 milliGRAM(s) Oral daily  enalapril 10 milliGRAM(s) Oral two times a day  enoxaparin Injectable 40 milliGRAM(s) SubCutaneous every 24 hours    loratadine 10 milliGRAM(s) Oral daily  montelukast 10 milliGRAM(s) Oral daily  guaiFENesin   Syrup  (Sugar-Free) 200 milliGRAM(s) Oral every 6 hours PRN  buDESOnide 160 MICROgram(s)/formoterol 4.5 MICROgram(s) Inhaler 2 Puff(s) Inhalation two times a day  ALBUTerol/ipratropium for Nebulization 3 milliLiter(s) Nebulizer every 6 hours PRN  famotidine    Tablet 10 milliGRAM(s) Oral daily  insulin lispro (HumaLOG) corrective regimen sliding scale   SubCutaneous three times a day before meals  insulin lispro (HumaLOG) corrective regimen sliding scale   SubCutaneous at bedtime  dextrose Gel 1 Dose(s) Oral once PRN  dextrose 50% Injectable 12.5 Gram(s) IV Push once  insulin glargine Injectable (LANTUS) 8 Unit(s) SubCutaneous at bedtime  dextrose 50% Injectable 25 Gram(s) IV Push once  dextrose 50% Injectable 25 Gram(s) IV Push once  glucagon  Injectable 1 milliGRAM(s) IntraMuscular once PRN  atorvastatin 20 milliGRAM(s) Oral at bedtime  insulin lispro Injectable (HumaLOG) 5 Unit(s) SubCutaneous before lunch  insulin lispro Injectable (HumaLOG) 5 Unit(s) SubCutaneous before dinner  insulin lispro Injectable (HumaLOG) 8 Unit(s) SubCutaneous before breakfast  predniSONE   Tablet 20 milliGRAM(s) Oral daily    dextrose 5%. 1000 milliLiter(s) IV Continuous <Continuous>  sodium chloride 0.65% Nasal 1 Spray(s) Both Nostrils three times a day  fluticasone propionate 50 MICROgram(s)/spray Nasal Spray 1 Spray(s) Both Nostrils two times a day      PAST MEDICAL & SURGICAL HISTORY:  Diverticulitis  Cardiomyopathy  Kidney stone  COPD (chronic obstructive pulmonary disease)  Cardiac Pacemaker  HTN - Hypertension  Gout  Diabetes  Congestive Heart Failure  Asthma  S/P cholecystectomy  AICD (Automatic Cardioverter/Defibrillator) Present: inserted in Aug, 2008. Due for battery change in 1 month. ( [employer]) . Inserted by Dr Duffy  S/P Cholecystectomy      FAMILY HISTORY:  No pertinent family history in first degree relatives      SOCIAL HISTORY:    x Non-smoker  [ ] Smoker  [ ] Alcohol      REVIEW OF SYSTEMS:  General: no fatigue/malaise, weight loss/gain.  Skin: no rashes.  Ophthalmologic: no blurred vision, no loss of vision. 	  ENT: no sore throat, rhinorrhea, sinus congestion.  Respiratory: no SOB, cough or wheeze.  Gastrointestinal:  no N/V/D, no melena/hematemesis/hematochezia.  Genitourinary: no dysuria/hesitancy or hematuria.  Musculoskeletal: no myalgias or arthralgias.  Neurological: no changes in vision or hearing, no lightheadedness/dizziness, no syncope/near syncope	  Psychiatric: no unusual stress/anxiety.   Hematology/Lymphatics: no unusual bleeding, bruising and no lymphadenopathy.  Endocrine: no unusual thirst.   All others negative except as stated above and in HPI.    PHYSICAL EXAM:  T(C): 36.5 (17 @ 14:22), Max: 36.8 (17 @ 22:14)  HR: 88 (17 @ 17:45) (76 - 98)  BP: 129/78 (17 @ 14:22) (129/78 - 159/86)  RR: 18 (17 @ 14:22) (18 - 18)  SpO2: 97% (17 @ 14:22) (96% - 98%)  Wt(kg): --  I&O's Summary    Appearance: Normal	  HEENT:   hoarse voice, no stridor or inspiratory wheeze  Lymphatic: No lymphadenopathy  Cardiovascular: Normal S1 S2, trace  JVD, No murmurs, trace edema  Respiratory: b/l loud wheezing thorughout, expiratory,  Psychiatry: A & O x 3, Mood & affect appropriate  Gastrointestinal:  Soft, Non-tender, + BS	  Skin: No rashes, No ecchymoses, No cyanosis	  Neurologic: Non-focal  Extremities: Normal range of motion, No clubbing, cyanosis or edema  Vascular: Peripheral pulses palpable 2+ bilaterally    LABS:	 	    CBC Full  -  ( 19 Sep 2017 10:59 )  WBC Count : 11.8 K/uL  Hemoglobin : 13.0 g/dL  Hematocrit : 38.1 %  Platelet Count - Automated : 127 K/uL  Mean Cell Volume : 101.0 fl  Mean Cell Hemoglobin : 34.5 pg  Mean Cell Hemoglobin Concentration : 34.0 gm/dL        140  |  101  |  23  ----------------------------<  258<H>  4.5   |  25  |  0.98    Ca    9.2      19 Sep 2017 10:59    PREVIOUS DIAGNOSTIC TESTING:    < from: Transthoracic Echocardiogram (17 @ 11:22) >    Patient name: ANDRE BARKER  YOB: 1945   Age: 72 (F)   MR#: [id number]  Study Date: 2017  Location: O/PSonographer: Merlyn Marquez [facility]  Study quality: Technically difficult  Referring Physician:  Blood Pressure: 130/88 mmHg  Height: 157 cm  Weight: 79 kg  BSA: 1.8 m2  ------------------------------------------------------------------------  PROCEDURE: Transthoracic echocardiogram with 2-D, M-Mode  and complete spectral and color flow Doppler.  INDICATION: Chronic systolic (congestive) heart failure  (I50.22)  ------------------------------------------------------------------------  Dimensions:    Normal Values:  LA:     4.7    2.0 - 4.0 cm  Ao:     2.5    2.0 - 3.8 cm  SEPTUM: 0.8    0.6 - 1.2 cm  PWT:    0.8    0.6 - 1.1 cm  LVIDd:  5.3    3.0 - 5.6 cm  LVIDs:         1.8 - 4.0 cm  Derived variables:  LVMI: 83 g/m2  RWT: 0.30  Doppler Peak Velocity (m/sec): AoV=1.8  ------------------------------------------------------------------------  Observations:  Mitral Valve: Mild mitral annular calcification, otherwise  normal mitral valve. Minimal mitral regurgitation.  Aortic Valve/Aorta: Aortic valve not well visualized. Peak  transaortic valve gradient equals 14 mm Hg, mean  transaortic valve gradient equals 9 mm Hg. No aortic valve  regurgitation seen. Peak left ventricular outflow tract  gradient equals 2 mm Hg.  Aortic Root: 2.5 cm.  Left Atrium: Normal left atrium.  LA volume index = 27  cc/m2.  Left Ventricle: Endocardium not well visualized; grossly  mild left ventricular systolic dysfunction. The basal  inferior, basal inferoseptal, and inferolateral walls  appear hypokinetic. Paradoxical septal motion consistent  with paced rhythm. Repeat imaing with intravenous echo  contrast could be performed ifclinically indicated. Normal  left ventricular internal dimensions and wall thicknesses.  Mild diastolic dysfunction (Stage I).  Right Heart: Right atrium not well visualized, probably  normal. The right ventricle is not well visualized; grossly  normal right ventricular systolic function. A device wire  is noted in the right heart. Normal tricuspid valve.  Minimal tricuspid regurgitation. Normal pulmonic valve.  Pericardium/Pleura: Normal pericardium with no pericardial  effusion.  Hemodynamic: Estimated right atrial pressure is 8 mm Hg.  Estimated right ventricular systolic pressure equals 37 mm  Hg, assuming right atrial pressure equals 8 mm Hg,  consistent with borderline pulmonary hypertension.  ------------------------------------------------------------------------  Conclusions:  1. Mild mitral annular calcification, otherwise normal  mitral valve. Minimal mitral regurgitation.  2. Aortic valve not well visualized. No aortic valve  regurgitation seen.  3. Endocardium not well visualized; grossly mild left  ventricular systolic dysfunction. The basal inferior, basal  inferoseptal, and inferolateral walls appear hypokinetic.  Paradoxical septal motion consistent with paced rhythm.  Repeat imaing with intravenous echo contrast could be  performed if clinically indicated.  4. Mild diastolic dysfunction (Stage I).  5. The right ventricle is not well visualized; grossly  normal right ventricular systolic function. A device wire  is noted in the right heart.  *** Compared with echocardiogram of 2016, overall LV  systolic function appears improved. LV systolic function  was grossly moderate to severely reduced on the prior  study.  ------------------------------------------------------------------------  Confirmed on  2017 - 15:07:05 by Jasmyn Holt M.D.  ------------------------------------------------------------------------    < end of copied text >    [ ]  Catheterization: < from: Cardiac Cath Lab (09 @ 13:39) >    [facility]  Department of Cardiology  [address] [place] [address]  [phone]  Cath Lab Report -- Comprehensive Report  Patient: ANDRE BARKER  Study date: 2009  Account number: [id number]  MR number: [id number]  : 1945  Gender: Female  Race:  Amer  Case Physician(s):  Grover Chambers M.D.  Fellow:  Vicky Jang M.D.  Referring Physician:  Joan Akins M.D.  Indications: Angina/MI: atypical chest pain.  History: History includes nonischemic cardiomyopathy. There were no  significant risk factors. Prior cardiovascular procedures: ICD  implantation.  Procedure:  Left coronary angiography.  Right coronary angiography.  Left heart catheterization with ventriculography.  Hemostasis with Angioseal.  Right heart catheterization.  Technique: The risks and alternatives of the procedures and conscious  sedation were explained to the patient and informed consent was obtained.  Cardiac catheterization performed electively.  Right femoral artery access. A 5FR SHEATH PINNACLE was inserted in the  vessel. Left coronary artery angiography. The vessel was injected  utilizing a 5FR FL4.0 EXPO catheter. Right coronary artery angiography.  The vessel was injected utilizing a 5FR FR4.0EXPO catheter. Left heart  catheterization. Ventriculography was performed. A 5FR  EXPO  catheter was utilized. Hemostasis with Angioseal (using 6FR VIP  ANGIO-SEAL). Right femoral vein access. A 7FR SHEATH PINNACLE was inserted  in the vessel. Right heart catheterization. The procedure was performed  utilizing a 7FR SWAN ALEXY catheter. Contrast given: Omnipaque 129 ml.  Fluoroscopy time: 2 min.  Medications given: Diazepam, 6 mg, IV. Lidocaine 1 percent, SC.  Ventricles: Global left ventricular function was severely depressed. EF  calculated by contrast ventriculography was 34 %. The left ventricle was  moderately dilated.  Coronary vessels: The coronary circulation is right dominant.  LM:      LM: The vessel was normal sized. Angiography showed no evidence of  disease.  LAD:      LAD: The vessel was normal sized. Angiography showed no evidence  of disease.  CX:      Circumflex: The vessel was normal sized. Angiography showed no  evidence of disease.  RCA:      RCA: The vessel wasnormal sized. Angiography showed no evidence  of disease.  Complications: There were no complications.  Summary:  Cardiac structures: Global left ventricular function was severely  depressed. EF calculated by contrast ventriculography was 34 %. The left  ventricle was moderately dilated.  Recommendations:  The patient should continue with the present medications.  Prepared and signed by  Grover Chambers M.D.  Signed 2009 14:08:53  Hemodynamic tables  Pressures:  Baseline/ Room Air  Pressures: - HR: 96  Pressures:  - Rhythm:  Pressures:  -- Aortic Pressure (S/D/M): 131/73/91  Pressures:  -- Left Ventricle (s/edp): 140/17/--  Pressures:  Post Angio  Pressures:  - HR: 94  Pressures:  - Rhythm:  Pressures:  -- Aortic Pressure (S/D/M): 141/71/88  Pressures:  -- Left Ventricle (s/edp): 140/18/--  Pressures:  -- Pulmonary Artery (S/D/M): 36/17/25  Pressures:  -- Pulmonary Capillary Wedge: 21/24/17  Pressures:  -- Right Atrium (a/v/M): 15/14/10  Pressures:  -- Right Ventricle (s/edp): 38/14/--  O2 Sats:  Post Angio  O2 Sats:  - HR: 94  O2 Sats:  - Rhythm:  O2 Sats:  -- AO: 12.7/94.6/16.34  O2 Sats:  -- PA: 12/73.3/11.96  Outputs:  Baseline/ Room Air  Outputs:  -- CALCULATIONS[age]  Outputs:  -- CALCULATIONS: Body Surface Area: 1.86  Outputs:  -- CALCULATIONS: Height in cm: 157.00  Outputs:  -- CALCULATIONS: Sex: Female  Outputs:  -- CALCULATIONS: Weight in k.70  Outputs:  Post Angio  Outputs:  -- OUTPUTS: Blood Oxygen Difference: 4.38  Outputs:  -- OUTPUTS: CO byFick: 5.67  Outputs:  -- OUTPUTS: Cipriano cardiac index: 3.04  Outputs:  -- OUTPUTS: O2 consumption: 248.00  Outputs:  -- OUTPUTS: Vo2 Indexed: 133.21  Outputs:  -- RESISTANCES: Left ventricular stroke work: 55.83  Outputs:  -- RESISTANCES: Left Ventricular Stroke Work index: 29.99  Outputs:  -- RESISTANCES: Pulmonary vascular index (dsc): 210.22  Outputs:  -- RESISTANCES: Pulmonary vascular index (Wood Units): 2.63  Outputs:  -- RESISTANCES: Pulmonary vascular resistance (dsc): 112.92  Outputs:  --RESISTANCES: Pulmonary vascular resistance (Wood Units): 1.41  Outputs:  -- RESISTANCES: PVR_SVR Ratio: 0.10  Outputs:  -- RESISTANCES: Right ventricular stroke work: 13.14  Outputs:  -- RESISTANCES: Right ventricular stroke work index: 7.06  Outputs:  -- RESISTANCES: Systemic vascular index (dsc): 2049.68  Outputs:  -- RESISTANCES: Systemic vascular index (Wood Units): 25.63  Outputs:  -- RESISTANCES: Systemic vascular resistance (dsc): 1100.97  Outputs:  -- RESISTANCES: Systemic vascular resistance (Wood Units): 13.77  Outputs:  -- RESISTANCES: Total pulmonary index (dsc): 656.95  Outputs:  -- RESISTANCES: Total pulmonary index (Wood Units): 8.21  Outputs:  -- RESISTANCES: Total pulmonary resistance (dsc): 352.88  Outputs:  -- RESISTANCES: Total pulmonary resistance (Wood Units): 4.41  Outputs:  -- RESISTANCES: Total vascular index (Wood Units): 28.91  Outputs:  -- RESISTANCES: Total vascular resistance (dsc): 1242.12  Outputs:  -- RESISTANCES: Total vascular resistance (Wood Units): 15.53  Outputs:  -- RESISTANCES: Total vascular resistance index (dsc): 2312.46  Outputs:  -- RESISTANCES: TPR_TVR Ratio: 0.28  Outputs:  -- SHUNTS: Qs Indexed: 3.04  Outputs:  -- SHUNTS: Systemic flow: 5.67    < end of copied text >    [ ] Stress Test:

## 2017-09-20 NOTE — CONSULT NOTE ADULT - ATTENDING COMMENTS
pt with longstanding h/o asthma, reports multiple environmental allergies as well.  admitted with exacerbation.  had recently had an outpt course of steroids.   reports she takes dulera "too much" ventolin, nebs, singulair, also maybe spiriva  unclear how well she understands her meds  also report she was worked up for immunotherapy but did not qualify in the past.  no eos on presentation.  comfortable, no sob. few exp wheezes, also upper airway component.  -continue pred 40, and taper  -symb 160 2p bid , alb prn, singulair  please have respiratory teach her proper technique (she says she has a spacer but doesn't use it)  -She plans to f/u with her pulmonologist, dr. olivas.
Ms. Dolan is currently without clear evidence of worsening congestive heart failure. Her LV systolic function is only mildly reduced. I discussed with her the potential use of hydralazine, but she is wary of starting a new drug at this time.

## 2017-09-20 NOTE — DIETITIAN INITIAL EVALUATION ADULT. - ADHERENCE
Pt follows a low sodium, CSTCHO diet. Pt limits sodium intake and buys low sodium options of foods in grocery stores. Tries to limit sweets and fruits but does not think raisins from cereal will raise her blood sugar. Checks blood sugar before breakfast and before bedtime at home, usually is 90-120mg/dL in the morning and 120-150mg/dL before bedtime. States she only take lantus at night if blood sugar >150mg/dL./fair

## 2017-09-20 NOTE — DIETITIAN INITIAL EVALUATION ADULT. - ENERGY NEEDS
ht: 62 inches, wt: 187.3 pounds, BMI: 34.3 kg/m2, IBW: 110 pounds (+/- 10%), 170 %IBW  Edema: none noted. Skin: intact.  Other pertinent information: 73y/o female c asthma (childhood onset, intubatedx1 2016), NICM w/ HFrEF s/p AICD, DM2 on insulin, HTN, HLD presents to [facility] for SOB/wheezing likely 2/2 asthma vs. COPD exacerbation 2/2 URI vs. allergy.

## 2017-09-20 NOTE — PROGRESS NOTE ADULT - PROBLEM SELECTOR PLAN 3
Unclear if SOB with exertion is 2/2 cardiac reasons. Mild systolic dysfunction and improving diastolic dysfunction on last TTE  -f/up Dr. Hobson's note today  - c/w asa, atorvastatin, enalapril  -lasix prn if legs are swollen  -does not take metoprolol bc of asthma.

## 2017-09-20 NOTE — DIETITIAN INITIAL EVALUATION ADULT. - NS AS NUTRI INTERV MEALS SNACK
General/healthful diet/1) Encourage good PO intake  2) Provide food preferences within therapeutic diet when requested.   3) Monitor po intake, wt, labs, GI tolerance, skin integrity/Carbohydrate - modified diet

## 2017-09-20 NOTE — PROGRESS NOTE ADULT - PROBLEM SELECTOR PLAN 1
Asthma vs COPD exacerbation. Still wheezing but mildly improved? Unclear if pt has chronic wheezing vs. upper resp wheezing.   -will c/w duoneb q4h, symbicort, singulair.   -taper prednisone, now on 40mg daily. SLow taper as outptient.   -Flonase for post nasal drip  -robitussin for cough  - daily peak flow.   Patient refuses IV steroid due to report of hallucinations

## 2017-09-20 NOTE — DIETITIAN INITIAL EVALUATION ADULT. - PROBLEM SELECTOR PLAN 1
Asthma class Mild to Moderate persistent. Pt states she has also been told she has COPD  - c/w duoneb, symbicort in hospital, prednisone 50mg (dosed x1 in ed continue daily), c/w monteleukast  - influenza vaccine offered but declined at this time  - pt tolearting full sentences and is now stable, c/w current level of treatment  - RVP negative, given hx would not r/o URI however not as likely. Asthma exacerbation possible c/w loratadine. additionally continue w/ pepcid for role of GERD in possible exacerbation  - CXR clear and patient afebrile not meeting sirs criteria.   - Giving azithromycin in setting of possible concurrent COPD exacerbation

## 2017-09-20 NOTE — DIETITIAN INITIAL EVALUATION ADULT. - OTHER INFO
Pt seen for length of stay. Per previous RD note Pt weighed 186.9lbs with an HbA1c on 8.0% on 5/13/17. Current Wt 187.3lbs, HbA1c 7.9%. Pt reports no GI distress, no chewing/swallowing difficulties.

## 2017-09-20 NOTE — PROGRESS NOTE ADULT - SUBJECTIVE AND OBJECTIVE BOX
Patient is a 72y old  Female who presents with a chief complaint of 72F w/ asthma exacerbation (15 Sep 2017 10:00)      SUBJECTIVE / OVERNIGHT EVENTS: No acute events overnight. No chest pain, palpitations, dizziness. Polyuria now improving. Having normal BM. Pt refused her lantus overnight because FS was 84.     CONSTITUTIONAL:  No weight loss, fever, chills, weakness or fatigue.  HEENT:  Eyes:  No visual loss, blurred vision, double vision No hearing loss, sneezing, congestion, runny nose or sore throat.  SKIN:  No rash or itching.  CARDIOVASCULAR:  No chest pain, chest pressure or chest discomfort. No palpitations or edema.  RESPIRATORY:  some SOB on exertion.   GASTROINTESTINAL:  No nausea, vomiting or diarrhea. No abdominal pain or blood.  GENITOURINARY:  Denies hematuria, dysuria.   NEUROLOGICAL:  No headache, dizziness, syncope, paralysis, ataxia, numbness or tingling in the extremities. No change in bowel or bladder control.  MUSCULOSKELETAL:  No muscle, back pain, joint pain or stiffness.  HEMATOLOGIC:  No anemia, bleeding or bruising.  LYMPHATICS:  No enlarged nodes. No history of splenectomy.  PSYCHIATRIC:  No history of depression or anxiety.      MEDICATIONS  (STANDING):  insulin lispro (HumaLOG) corrective regimen sliding scale   SubCutaneous three times a day before meals  insulin lispro (HumaLOG) corrective regimen sliding scale   SubCutaneous at bedtime  dextrose 5%. 1000 milliLiter(s) (50 mL/Hr) IV Continuous <Continuous>  dextrose 50% Injectable 12.5 Gram(s) IV Push once  insulin glargine Injectable (LANTUS) 8 Unit(s) SubCutaneous at bedtime  dextrose 50% Injectable 25 Gram(s) IV Push once  dextrose 50% Injectable 25 Gram(s) IV Push once  aspirin enteric coated 81 milliGRAM(s) Oral daily  enalapril 10 milliGRAM(s) Oral two times a day  loratadine 10 milliGRAM(s) Oral daily  atorvastatin 20 milliGRAM(s) Oral at bedtime  famotidine    Tablet 10 milliGRAM(s) Oral daily  montelukast 10 milliGRAM(s) Oral daily  enoxaparin Injectable 40 milliGRAM(s) SubCutaneous every 24 hours  docusate sodium 100 milliGRAM(s) Oral three times a day  senna 2 Tablet(s) Oral at bedtime  sodium chloride 0.65% Nasal 1 Spray(s) Both Nostrils three times a day  fluticasone propionate 50 MICROgram(s)/spray Nasal Spray 1 Spray(s) Both Nostrils two times a day  predniSONE   Tablet 40 milliGRAM(s) Oral daily  insulin lispro Injectable (HumaLOG) 5 Unit(s) SubCutaneous three times a day before meals  buDESOnide 160 MICROgram(s)/formoterol 4.5 MICROgram(s) Inhaler 2 Puff(s) Inhalation two times a day  ALBUTerol/ipratropium for Nebulization 3 milliLiter(s) Nebulizer every 4 hours    MEDICATIONS  (PRN):  dextrose Gel 1 Dose(s) Oral once PRN Blood Glucose LESS THAN 70 milliGRAM(s)/deciliter  glucagon  Injectable 1 milliGRAM(s) IntraMuscular once PRN Glucose LESS THAN 70 milligrams/deciliter  polyethylene glycol 3350 17 Gram(s) Oral daily PRN Constipation  guaiFENesin   Syrup  (Sugar-Free) 200 milliGRAM(s) Oral every 6 hours PRN Cough      I&O's Summary      Vital Signs Last 24 Hrs  T(C): 36.4 (20 Sep 2017 04:32), Max: 36.8 (19 Sep 2017 22:14)  T(F): 97.6 (20 Sep 2017 04:32), Max: 98.2 (19 Sep 2017 22:14)  HR: 76 (20 Sep 2017 04:32) (76 - 97)  BP: 132/80 (20 Sep 2017 04:32) (132/80 - 159/86)  BP(mean): --  RR: 18 (20 Sep 2017 04:32) (18 - 18)  SpO2: 98% (20 Sep 2017 04:32) (97% - 98%)  CAPILLARY BLOOD GLUCOSE  87 (19 Sep 2017 21:59)  172 (19 Sep 2017 17:38)  286 (19 Sep 2017 12:36)  161 (19 Sep 2017 08:38)          PHYSICAL EXAM:  GENERAL: NAD, well-developed  HEAD:  Atraumatic, Normocephalic  EYES: EOMI, PERRLA, conjunctiva and sclera clear  NECK: Supple, No JVD  CHEST/LUNG: diffuse expiratory wheezing-mildly improved today.   HEART: Regular rate and rhythm; No murmurs, rubs, or gallops  ABDOMEN: Soft, Nontender, Nondistended; Bowel sounds present  EXTREMITIES:  2+ Peripheral Pulses, +compression stockings in place.   PSYCH: AAOx3  NEUROLOGY: non-focal  SKIN: No rashes or lesions    LABS:                        13.0   11.8  )-----------( 127      ( 19 Sep 2017 10:59 )             38.1     Auto Eosinophil # x     / Auto Eosinophil % x     / Auto Neutrophil # x     / Auto Neutrophil % x     / BANDS % x        09-19    140  |  101  |  23  ----------------------------<  258<H>  4.5   |  25  |  0.98    Ca    9.2      19 Sep 2017 10:59

## 2017-09-20 NOTE — PROGRESS NOTE ADULT - PROBLEM SELECTOR PLAN 2
hgba1c 7.9.  yesterday afternoon.   - cw lantus 8 units. Will increase humalog to 8 units pre-breakfast.   -cw PASTORA and check FS TID and qhs. hgba1c 7.9.  yesterday afternoon.   - cw lantus 5 units. Will increase humalog to 8 units pre-breakfast.   -cw PASTORA and check FS TID and qhs.

## 2017-09-20 NOTE — DIETITIAN INITIAL EVALUATION ADULT. - ORAL INTAKE PTA
Breakfast- raisin bran with toast and eggs; Lunch- sandwich or homemade soup; Dinner- grilled chicken, mashed potatoes, beans from [facility] or homemade meal with starch, protein and vegetables./good

## 2017-09-20 NOTE — DIETITIAN INITIAL EVALUATION ADULT. - NS AS NUTRI INTERV ED CONTENT
Priority modifications/1) Reviewed the Heart Healthy Nutrition Therapy handout. Discussed importance of limiting salt/sodium intake, including ways to limit salt in current diet and selecting low sodium or salt free foods.    Emphasized importance of taking & monitoring daily weights as well as the importance of limiting fluid intake. 2) Reviewed T2DM Nutrition Therapy Handout. Discussed balanced meal pattern, monitoring portion sizes, including protein at each meal and snack, and limiting concentrated sweets. Reinforced importance of self-monitoring blood glucose levels./Purpose of the nutrition education/Nutrition relationship to health/disease/Recommended modifications

## 2017-09-20 NOTE — DIETITIAN INITIAL EVALUATION ADULT. - PERTINENT MEDS FT
humalog pre meal, deltasone, senna, colace, miralax, lipitor, lantus, pepcid, Humalog insulin sliding scale

## 2017-09-20 NOTE — PROGRESS NOTE ADULT - ASSESSMENT
72F w/ asthma (childhood onset, intubatedx1 2016), NICM w/ HFrEF s/p AICD, DM2 on insulin, HTN, HLD presents to [facility] for SOB/wheezing likely 2/2 asthma vs. COPD exacerbation 2/2 URI vs. allergy.

## 2017-09-21 VITALS
HEART RATE: 90 BPM | TEMPERATURE: 98 F | OXYGEN SATURATION: 97 % | SYSTOLIC BLOOD PRESSURE: 106 MMHG | DIASTOLIC BLOOD PRESSURE: 76 MMHG

## 2017-09-21 PROCEDURE — 99239 HOSP IP/OBS DSCHRG MGMT >30: CPT

## 2017-09-21 RX ORDER — MAGNESIUM OXIDE 400 MG ORAL TABLET 241.3 MG
1 TABLET ORAL
Qty: 0 | Refills: 0 | COMMUNITY

## 2017-09-21 RX ORDER — POTASSIUM CHLORIDE 20 MEQ
1 PACKET (EA) ORAL
Qty: 0 | Refills: 0 | COMMUNITY

## 2017-09-21 RX ADMIN — Medication 20 MILLIGRAM(S): at 05:28

## 2017-09-21 RX ADMIN — LORATADINE 10 MILLIGRAM(S): 10 TABLET ORAL at 11:18

## 2017-09-21 RX ADMIN — Medication 2: at 13:15

## 2017-09-21 RX ADMIN — ENOXAPARIN SODIUM 40 MILLIGRAM(S): 100 INJECTION SUBCUTANEOUS at 05:28

## 2017-09-21 RX ADMIN — Medication 8 UNIT(S): at 09:23

## 2017-09-21 RX ADMIN — Medication 1 SPRAY(S): at 05:28

## 2017-09-21 RX ADMIN — Medication 1: at 09:23

## 2017-09-21 RX ADMIN — Medication 81 MILLIGRAM(S): at 11:17

## 2017-09-21 RX ADMIN — Medication 5 UNIT(S): at 13:15

## 2017-09-21 RX ADMIN — MONTELUKAST 10 MILLIGRAM(S): 4 TABLET, CHEWABLE ORAL at 11:18

## 2017-09-21 RX ADMIN — Medication 200 MILLIGRAM(S): at 11:21

## 2017-09-21 RX ADMIN — BUDESONIDE AND FORMOTEROL FUMARATE DIHYDRATE 2 PUFF(S): 160; 4.5 AEROSOL RESPIRATORY (INHALATION) at 05:45

## 2017-09-21 RX ADMIN — FAMOTIDINE 10 MILLIGRAM(S): 10 INJECTION INTRAVENOUS at 11:17

## 2017-09-21 RX ADMIN — Medication 3 MILLILITER(S): at 11:17

## 2017-09-21 RX ADMIN — Medication 1 SPRAY(S): at 05:29

## 2017-09-21 RX ADMIN — Medication 10 MILLIGRAM(S): at 05:28

## 2017-09-21 NOTE — PROGRESS NOTE ADULT - PROBLEM SELECTOR PLAN 1
Asthma vs COPD exacerbation. Still wheezing but mildly improved? Unclear if pt has chronic wheezing vs. upper resp wheezing 2/2 vocal cord dysfunction given hx of intubation.   -will c/w duoneb q4h prn (last used 9/20 10AM), symbicort, singulair.   -taper prednisone, now on 20mg daily. SLow taper as outptient.   -Flonase for post nasal drip  -robitussin for cough  - daily peak flow.   Patient refuses IV steroid due to report of hallucinations Still wheezing but mildly improved? Unclear if pt has chronic wheezing vs. upper resp wheezing 2/2 vocal cord dysfunction given hx of intubation.   -will c/w duoneb q4h prn (last used 9/20 10AM), symbicort, singulair.   -taper prednisone, now on 20mg daily. SLow taper as outptient.   -Flonase for post nasal drip  -robitussin for cough  - daily peak flow.   Patient refuses IV steroid due to report of hallucinations Still wheezing but mildly improved? Unclear if pt has chronic wheezing vs. upper resp wheezing or vocal cord dysfunction given hx of intubation. Peak flows have been at patient's baseline so this wheezing may not be 2/2 asthma.   -will c/w duoneb q4h prn (last used 9/20 10AM), symbicort, singulair.   -taper prednisone, now on 20mg daily. SLow taper as outptient.   -Flonase for post nasal drip  -robitussin for cough  - daily peak flow.   Patient refuses IV steroid due to report of hallucinations

## 2017-09-21 NOTE — PROGRESS NOTE ADULT - PROBLEM SELECTOR PLAN 2
hgba1c 7.9.  yesterday afternoon but of note, pt had not taken her lantus the night before  - cw lantus 8 units and humalog 8/5/5  -cw PASTORA and check FS TID and qhs.

## 2017-09-21 NOTE — PROGRESS NOTE ADULT - ATTENDING COMMENTS
asthma exacerbation- treating with oral steroids, symbicort, singulair, duonebs. improving. will make duonebs prn today. start taper of prednisone. preparing for d/c.   Dm2- midday fingerstick elevated. will increase premeal humalog before breakfast to 8 units  CHF-euvolemic currently. not in excacerbation, however  CHF likely contributing to pts continued feelings of dyspnea. follow up CHF consult regarding medication optimization in this patient (i.e. starting hydralazine, nitrate?)  hopeful d/c tomorrow if continues to clinically improve  pt with no PT needs  cont lovenox for dvt ppx.
asthma exacerbation- tapering oral steroids, symbicort, singulair, duonebs. improving. continues to wheezing but believe source is upper airway. pt would benefit from ENT evaluation (can be outpatient setting, no urgency). discussed with pt.  Dm2- will discharge on   CHF-euvolemic currently. not in excacerbation. outpatinet follow up. CHF recs appreciated.  pt with no PT needs  d/c today- d/c time 45 mins.
Patient states to feel generally better.  Patient denies any h/o intubation.  She states to have a nagging dry cough.  Will continue current medications.  Will start Tessalon perles for the cough .  Will continue to monitor.  Patient is on ACEI , will monitor if it is not the causating factor for the cough.
asthma exacerbation- treating with oral steroids, symbicort, singulair, duonebs. Not requiring O2, however continues to wheeze, have continued expiratory wheezing on exam. Pulmonary evaluated pt with no additional recs.   continue symbicort to 160mcg dosing, continue prednisone. will change duonebs to PRN and assess use. continue flonase.  out of bed.   will consider cardiac causing of wheezing- no acute volume overload noted on exam, bnp not significant, cxr without pulm vasc congestion or pleural effusion. will ask pts cardiologist to see for another opinion.    Dm2- midday fingerstick elevated. will increase premeal humalog before breakfast to 12 units.
asthma exacerbation- treating with oral steroids, symbicort, singulair, duonebs. Not requiring O2, however continues to wheeze, have continued expiratory wheezing on exam, not feeling clinically improved. Pulmonary evaluated pt yesterday with no additional recs.   Will uptitrate dosing of symbicort to 160mcg dosing, change duonebs to q4h, cont prednsione.   will consider cardiac causing of wheezing- no acute volume overload noted on exam, bnp not significant, cxr without pulm vasc congestion or pleural effusion. will ask pts cardiologist Dr. Hobson to see for another opinion.
Patient was seen this afternoon, she states that she felt better , but felt like she was still wheezing, no chest pain , cough has improved.  Patient has h/o CHF ( combined syst and Diastolic) which is currently well compensated.  We will continue the current management for Asthma exacerbation / my lung examination today revealed decreased expiratory wheezing.  Of note, patient was c/o increased urination which could be early symptom of UTI and I agree with Ceftriaxone.  Will  order support stocking due to h/o B/l leg swelling and if patient decompensates or has increased wheezing , will get BNP and CXR.

## 2017-09-21 NOTE — PROGRESS NOTE ADULT - SUBJECTIVE AND OBJECTIVE BOX
Patient is a 72y old  Female who presents with a chief complaint of 72F w/ asthma exacerbation (15 Sep 2017 10:00)      SUBJECTIVE / OVERNIGHT EVENTS: No acute events overnight, pt seen and evaluated at bedside-complains of poor sleep overnight. Still complains of wheezing with exertion. Does not want to start hydralazine-is wear of starting a new med.     CONSTITUTIONAL:  No weight loss, fever, chills, weakness or fatigue.  HEENT:  Eyes:  No visual loss, blurred vision, double vision, No hearing loss, sneezing, congestion, runny nose or sore throat.  SKIN:  No rash or itching.  CARDIOVASCULAR:  No chest pain, chest pressure or chest discomfort. No palpitations or edema.  RESPIRATORY:  No shortness of breath, cough or sputum. +wheezing.   GASTROINTESTINAL:  Nonausea, vomiting or diarrhea. No abdominal pain or blood.  GENITOURINARY:  Denies hematuria, dysuria.   NEUROLOGICAL:  No headache, dizziness, syncope, paralysis, ataxia, numbness or tingling in the extremities. No change in bowel or bladder control.  MUSCULOSKELETAL:  No muscle, back pain, joint pain or stiffness.  HEMATOLOGIC:  No, bleeding or bruising.  LYMPHATICS:  No enlarged nodes. No history of splenectomy.  PSYCHIATRIC:  No history of depression or anxiety.    MEDICATIONS  (STANDING):  insulin lispro (HumaLOG) corrective regimen sliding scale   SubCutaneous three times a day before meals  insulin lispro (HumaLOG) corrective regimen sliding scale   SubCutaneous at bedtime  dextrose 5%. 1000 milliLiter(s) (50 mL/Hr) IV Continuous <Continuous>  dextrose 50% Injectable 12.5 Gram(s) IV Push once  insulin glargine Injectable (LANTUS) 8 Unit(s) SubCutaneous at bedtime  dextrose 50% Injectable 25 Gram(s) IV Push once  dextrose 50% Injectable 25 Gram(s) IV Push once  aspirin enteric coated 81 milliGRAM(s) Oral daily  enalapril 10 milliGRAM(s) Oral two times a day  loratadine 10 milliGRAM(s) Oral daily  atorvastatin 20 milliGRAM(s) Oral at bedtime  famotidine    Tablet 10 milliGRAM(s) Oral daily  montelukast 10 milliGRAM(s) Oral daily  enoxaparin Injectable 40 milliGRAM(s) SubCutaneous every 24 hours  sodium chloride 0.65% Nasal 1 Spray(s) Both Nostrils three times a day  fluticasone propionate 50 MICROgram(s)/spray Nasal Spray 1 Spray(s) Both Nostrils two times a day  buDESOnide 160 MICROgram(s)/formoterol 4.5 MICROgram(s) Inhaler 2 Puff(s) Inhalation two times a day  insulin lispro Injectable (HumaLOG) 5 Unit(s) SubCutaneous before lunch  insulin lispro Injectable (HumaLOG) 5 Unit(s) SubCutaneous before dinner  insulin lispro Injectable (HumaLOG) 8 Unit(s) SubCutaneous before breakfast  predniSONE   Tablet 20 milliGRAM(s) Oral daily    MEDICATIONS  (PRN):  dextrose Gel 1 Dose(s) Oral once PRN Blood Glucose LESS THAN 70 milliGRAM(s)/deciliter  glucagon  Injectable 1 milliGRAM(s) IntraMuscular once PRN Glucose LESS THAN 70 milligrams/deciliter  guaiFENesin   Syrup  (Sugar-Free) 200 milliGRAM(s) Oral every 6 hours PRN Cough  ALBUTerol/ipratropium for Nebulization 3 milliLiter(s) Nebulizer every 6 hours PRN Shortness of Breath and/or Wheezing      I&O's Summary    20 Sep 2017 07:01  -  21 Sep 2017 07:00  --------------------------------------------------------  IN: 600 mL / OUT: 0 mL / NET: 600 mL        Vital Signs Last 24 Hrs  T(C): 36.7 (21 Sep 2017 04:53), Max: 36.8 (20 Sep 2017 21:49)  T(F): 98.1 (21 Sep 2017 04:53), Max: 98.3 (20 Sep 2017 21:49)  HR: 66 (21 Sep 2017 05:45) (66 - 98)  BP: 118/79 (21 Sep 2017 04:53) (118/79 - 147/94)  BP(mean): --  RR: 18 (21 Sep 2017 04:53) (18 - 18)  SpO2: 96% (21 Sep 2017 05:45) (96% - 98%)  CAPILLARY BLOOD GLUCOSE  154 (20 Sep 2017 21:49)  186 (20 Sep 2017 17:37)  288 (20 Sep 2017 12:00)  182 (20 Sep 2017 08:57)          PHYSICAL EXAM:  GENERAL: NAD, well-developed, euvolemic.   HEAD:  Atraumatic, Normocephalic  EYES: EOMI, PERRLA, conjunctiva and sclera clear  NECK: Supple, No JVD  CHEST/LUNG: diffuse wheezing.   HEART: Regular rate and rhythm; No murmurs, rubs, or gallops  ABDOMEN: Soft, Nontender, Nondistended; Bowel sounds present  EXTREMITIES:  2+ Peripheral Pulses, +compression stockings in place  PSYCH: AAOx3  NEUROLOGY: non-focal  SKIN: No rashes or lesions    LABS:                        13.0   11.8  )-----------( 127      ( 19 Sep 2017 10:59 )             38.1     Auto Eosinophil # x     / Auto Eosinophil % x     / Auto Neutrophil # x     / Auto Neutrophil % x     / BANDS % x        09-19    140  |  101  |  23  ----------------------------<  258<H>  4.5   |  25  |  0.98    Ca    9.2      19 Sep 2017 10:59                RESPIRATORY  VENT:    ABG:     VBG:     RADIOLOGY & ADDITIONAL TESTS:    Imaging Personally Reviewed:    Consultant(s) Notes Reviewed:  heart failure.     Care Discussed with Consultants/Other Providers: Patient is a 72y old  Female who presents with a chief complaint of 72F w/ asthma exacerbation (15 Sep 2017 10:00)      SUBJECTIVE / OVERNIGHT EVENTS: No acute events overnight, pt seen and evaluated at bedside-complains of poor sleep overnight. Still complains of wheezing with exertion. Does not want to start hydralazine-is weary of starting a new med.     CONSTITUTIONAL:  No weight loss, fever, chills, weakness or fatigue.  HEENT:  Eyes:  No visual loss, blurred vision, double vision, No hearing loss, sneezing, congestion, runny nose or sore throat.  SKIN:  No rash or itching.  CARDIOVASCULAR:  No chest pain, chest pressure or chest discomfort. No palpitations or edema.  RESPIRATORY:  No shortness of breath, cough or sputum. +wheezing.   GASTROINTESTINAL:  Nonausea, vomiting or diarrhea. No abdominal pain or blood.  GENITOURINARY:  Denies hematuria, dysuria.   NEUROLOGICAL:  No headache, dizziness, syncope, paralysis, ataxia, numbness or tingling in the extremities. No change in bowel or bladder control.  MUSCULOSKELETAL:  No muscle, back pain, joint pain or stiffness.  HEMATOLOGIC:  No, bleeding or bruising.  LYMPHATICS:  No enlarged nodes. No history of splenectomy.  PSYCHIATRIC:  No history of depression or anxiety.    MEDICATIONS  (STANDING):  insulin lispro (HumaLOG) corrective regimen sliding scale   SubCutaneous three times a day before meals  insulin lispro (HumaLOG) corrective regimen sliding scale   SubCutaneous at bedtime  dextrose 5%. 1000 milliLiter(s) (50 mL/Hr) IV Continuous <Continuous>  dextrose 50% Injectable 12.5 Gram(s) IV Push once  insulin glargine Injectable (LANTUS) 8 Unit(s) SubCutaneous at bedtime  dextrose 50% Injectable 25 Gram(s) IV Push once  dextrose 50% Injectable 25 Gram(s) IV Push once  aspirin enteric coated 81 milliGRAM(s) Oral daily  enalapril 10 milliGRAM(s) Oral two times a day  loratadine 10 milliGRAM(s) Oral daily  atorvastatin 20 milliGRAM(s) Oral at bedtime  famotidine    Tablet 10 milliGRAM(s) Oral daily  montelukast 10 milliGRAM(s) Oral daily  enoxaparin Injectable 40 milliGRAM(s) SubCutaneous every 24 hours  sodium chloride 0.65% Nasal 1 Spray(s) Both Nostrils three times a day  fluticasone propionate 50 MICROgram(s)/spray Nasal Spray 1 Spray(s) Both Nostrils two times a day  buDESOnide 160 MICROgram(s)/formoterol 4.5 MICROgram(s) Inhaler 2 Puff(s) Inhalation two times a day  insulin lispro Injectable (HumaLOG) 5 Unit(s) SubCutaneous before lunch  insulin lispro Injectable (HumaLOG) 5 Unit(s) SubCutaneous before dinner  insulin lispro Injectable (HumaLOG) 8 Unit(s) SubCutaneous before breakfast  predniSONE   Tablet 20 milliGRAM(s) Oral daily    MEDICATIONS  (PRN):  dextrose Gel 1 Dose(s) Oral once PRN Blood Glucose LESS THAN 70 milliGRAM(s)/deciliter  glucagon  Injectable 1 milliGRAM(s) IntraMuscular once PRN Glucose LESS THAN 70 milligrams/deciliter  guaiFENesin   Syrup  (Sugar-Free) 200 milliGRAM(s) Oral every 6 hours PRN Cough  ALBUTerol/ipratropium for Nebulization 3 milliLiter(s) Nebulizer every 6 hours PRN Shortness of Breath and/or Wheezing      I&O's Summary    20 Sep 2017 07:01  -  21 Sep 2017 07:00  --------------------------------------------------------  IN: 600 mL / OUT: 0 mL / NET: 600 mL        Vital Signs Last 24 Hrs  T(C): 36.7 (21 Sep 2017 04:53), Max: 36.8 (20 Sep 2017 21:49)  T(F): 98.1 (21 Sep 2017 04:53), Max: 98.3 (20 Sep 2017 21:49)  HR: 66 (21 Sep 2017 05:45) (66 - 98)  BP: 118/79 (21 Sep 2017 04:53) (118/79 - 147/94)  BP(mean): --  RR: 18 (21 Sep 2017 04:53) (18 - 18)  SpO2: 96% (21 Sep 2017 05:45) (96% - 98%)  CAPILLARY BLOOD GLUCOSE  154 (20 Sep 2017 21:49)  186 (20 Sep 2017 17:37)  288 (20 Sep 2017 12:00)  182 (20 Sep 2017 08:57)          PHYSICAL EXAM:  GENERAL: NAD, well-developed, euvolemic.   HEAD:  Atraumatic, Normocephalic  EYES: EOMI, PERRLA, conjunctiva and sclera clear  NECK: Supple, No JVD  CHEST/LUNG: diffuse wheezing.   HEART: Regular rate and rhythm; No murmurs, rubs, or gallops  ABDOMEN: Soft, Nontender, Nondistended; Bowel sounds present  EXTREMITIES:  2+ Peripheral Pulses, +compression stockings in place  PSYCH: AAOx3  NEUROLOGY: non-focal  SKIN: No rashes or lesions    LABS:                        13.0   11.8  )-----------( 127      ( 19 Sep 2017 10:59 )             38.1     Auto Eosinophil # x     / Auto Eosinophil % x     / Auto Neutrophil # x     / Auto Neutrophil % x     / BANDS % x        09-19    140  |  101  |  23  ----------------------------<  258<H>  4.5   |  25  |  0.98    Ca    9.2      19 Sep 2017 10:59                RESPIRATORY  VENT:    ABG:     VBG:     RADIOLOGY & ADDITIONAL TESTS:    Imaging Personally Reviewed:    Consultant(s) Notes Reviewed:  heart failure.     Care Discussed with Consultants/Other Providers:

## 2017-09-21 NOTE — PROGRESS NOTE ADULT - PROBLEM SELECTOR PLAN 3
Per heart failure, no clear evidence of worsening heart failure. Pt curently refusing hydralazine.   - c/w asa, atorvastatin, enalapril  -lasix prn if legs are swollen  -does not take metoprolol bc of asthma.  -f/up outpatient with Dr. Hobson regarding HF.

## 2017-09-21 NOTE — PROGRESS NOTE ADULT - PROBLEM SELECTOR PROBLEM 1
Asthma exacerbation

## 2017-09-21 NOTE — PROGRESS NOTE ADULT - ASSESSMENT
72F w/ asthma (childhood onset, intubatedx1 2016), NICM w/ HFrEF s/p AICD, DM2 on insulin, HTN, HLD presents to [facility] for SOB/wheezing likely 2/2 asthma vs. COPD exacerbation 2/2 URI vs. allergy. 72F w/ asthma (childhood onset, intubatedx1 2016), NICM w/ HFrEF s/p AICD, DM2 on insulin, HTN, HLD presents to [facility] for SOB/wheezing likely 2/2 asthma vs. coal cord dysfunction. 72F w/ asthma (childhood onset, intubatedx1 2016), NICM w/ HFrEF s/p AICD, DM2 on insulin, HTN, HLD presents to [facility] for SOB/wheezing likely 2/2 asthma vs. vocal cord dysfunction.

## 2017-09-25 ENCOUNTER — APPOINTMENT (OUTPATIENT)
Dept: OTOLARYNGOLOGY | Facility: CLINIC | Age: 72
End: 2017-09-25
Payer: MEDICARE

## 2017-09-25 VITALS
BODY MASS INDEX: 34.04 KG/M2 | WEIGHT: 185 LBS | HEART RATE: 82 BPM | SYSTOLIC BLOOD PRESSURE: 117 MMHG | HEIGHT: 62 IN | DIASTOLIC BLOOD PRESSURE: 82 MMHG

## 2017-09-25 DIAGNOSIS — Z98.890 OTHER SPECIFIED POSTPROCEDURAL STATES: ICD-10-CM

## 2017-09-25 DIAGNOSIS — R49.0 DYSPHONIA: ICD-10-CM

## 2017-09-25 DIAGNOSIS — H90.3 SENSORINEURAL HEARING LOSS, BILATERAL: ICD-10-CM

## 2017-09-25 DIAGNOSIS — Z83.3 FAMILY HISTORY OF DIABETES MELLITUS: ICD-10-CM

## 2017-09-25 DIAGNOSIS — K21.9 GASTRO-ESOPHAGEAL REFLUX DISEASE W/OUT ESOPHAGITIS: ICD-10-CM

## 2017-09-25 DIAGNOSIS — Z88.9 ALLERGY STATUS TO UNSPECIFIED DRUGS, MEDICAMENTS AND BIOLOGICAL SUBSTANCES: ICD-10-CM

## 2017-09-25 PROCEDURE — 31575 DIAGNOSTIC LARYNGOSCOPY: CPT

## 2017-09-25 PROCEDURE — 99204 OFFICE O/P NEW MOD 45 MIN: CPT | Mod: 25

## 2017-09-25 PROCEDURE — 92557 COMPREHENSIVE HEARING TEST: CPT

## 2017-09-25 PROCEDURE — 92567 TYMPANOMETRY: CPT

## 2017-09-25 PROCEDURE — G0268 REMOVAL OF IMPACTED WAX MD: CPT

## 2017-10-03 NOTE — DISCHARGE NOTE ADULT - ADDITIONAL INSTRUCTIONS
AICD (automatic cardioverter/defibrillator) present    Atrial fibrillation    Coronary artery disease involving native coronary artery of native heart without angina pectoris    Ischemic cardiomyopathy    VT (ventricular tachycardia) Follow-up with your primary care physician in 1 week.    Follow-up with pulmonary in 1 week for pulmonary function test. Follow-up with your primary care physician in 1 week.  Follow-up with pulmonary in 1 week for pulmonary function test.

## 2017-10-19 PROCEDURE — 84443 ASSAY THYROID STIM HORMONE: CPT

## 2017-10-19 PROCEDURE — 87633 RESP VIRUS 12-25 TARGETS: CPT

## 2017-10-19 PROCEDURE — 84439 ASSAY OF FREE THYROXINE: CPT

## 2017-10-19 PROCEDURE — 82803 BLOOD GASES ANY COMBINATION: CPT

## 2017-10-19 PROCEDURE — 96374 THER/PROPH/DIAG INJ IV PUSH: CPT

## 2017-10-19 PROCEDURE — 80053 COMPREHEN METABOLIC PANEL: CPT

## 2017-10-19 PROCEDURE — 82330 ASSAY OF CALCIUM: CPT

## 2017-10-19 PROCEDURE — 99285 EMERGENCY DEPT VISIT HI MDM: CPT | Mod: 25

## 2017-10-19 PROCEDURE — 83880 ASSAY OF NATRIURETIC PEPTIDE: CPT

## 2017-10-19 PROCEDURE — 87581 M.PNEUMON DNA AMP PROBE: CPT

## 2017-10-19 PROCEDURE — 85014 HEMATOCRIT: CPT

## 2017-10-19 PROCEDURE — 96375 TX/PRO/DX INJ NEW DRUG ADDON: CPT

## 2017-10-19 PROCEDURE — G0378: CPT

## 2017-10-19 PROCEDURE — 71046 X-RAY EXAM CHEST 2 VIEWS: CPT

## 2017-10-19 PROCEDURE — 82435 ASSAY OF BLOOD CHLORIDE: CPT

## 2017-10-19 PROCEDURE — 87798 DETECT AGENT NOS DNA AMP: CPT

## 2017-10-19 PROCEDURE — 94640 AIRWAY INHALATION TREATMENT: CPT

## 2017-10-19 PROCEDURE — 85027 COMPLETE CBC AUTOMATED: CPT

## 2017-10-19 PROCEDURE — 87086 URINE CULTURE/COLONY COUNT: CPT

## 2017-10-19 PROCEDURE — 81001 URINALYSIS AUTO W/SCOPE: CPT

## 2017-10-19 PROCEDURE — 84295 ASSAY OF SERUM SODIUM: CPT

## 2017-10-19 PROCEDURE — 71045 X-RAY EXAM CHEST 1 VIEW: CPT

## 2017-10-19 PROCEDURE — 82947 ASSAY GLUCOSE BLOOD QUANT: CPT

## 2017-10-19 PROCEDURE — 83605 ASSAY OF LACTIC ACID: CPT

## 2017-10-19 PROCEDURE — 87486 CHLMYD PNEUM DNA AMP PROBE: CPT

## 2017-10-19 PROCEDURE — 80048 BASIC METABOLIC PNL TOTAL CA: CPT

## 2017-10-19 PROCEDURE — 84132 ASSAY OF SERUM POTASSIUM: CPT

## 2017-10-19 PROCEDURE — 83036 HEMOGLOBIN GLYCOSYLATED A1C: CPT

## 2017-11-06 ENCOUNTER — INPATIENT (INPATIENT)
Facility: HOSPITAL | Age: 72
LOS: 2 days | Discharge: HOME | DRG: 202 | End: 2017-11-09
Admitting: HOSPITALIST
Payer: MEDICARE

## 2017-11-06 VITALS
TEMPERATURE: 98 F | SYSTOLIC BLOOD PRESSURE: 148 MMHG | HEART RATE: 106 BPM | DIASTOLIC BLOOD PRESSURE: 77 MMHG | OXYGEN SATURATION: 98 % | RESPIRATION RATE: 16 BRPM

## 2017-11-06 DIAGNOSIS — J45.901 UNSPECIFIED ASTHMA WITH (ACUTE) EXACERBATION: ICD-10-CM

## 2017-11-06 LAB
ALBUMIN SERPL ELPH-MCNC: 4 G/DL — SIGNIFICANT CHANGE UP (ref 3.3–5)
ALP SERPL-CCNC: 127 U/L — HIGH (ref 40–120)
ALT FLD-CCNC: 22 U/L RC — SIGNIFICANT CHANGE UP (ref 10–45)
ANION GAP SERPL CALC-SCNC: 10 MMOL/L — SIGNIFICANT CHANGE UP (ref 5–17)
AST SERPL-CCNC: 17 U/L — SIGNIFICANT CHANGE UP (ref 10–40)
BASOPHILS # BLD AUTO: 0 K/UL — SIGNIFICANT CHANGE UP (ref 0–0.2)
BASOPHILS NFR BLD AUTO: 0.2 % — SIGNIFICANT CHANGE UP (ref 0–2)
BILIRUB SERPL-MCNC: 0.4 MG/DL — SIGNIFICANT CHANGE UP (ref 0.2–1.2)
BUN SERPL-MCNC: 21 MG/DL — SIGNIFICANT CHANGE UP (ref 7–23)
CALCIUM SERPL-MCNC: 9.6 MG/DL — SIGNIFICANT CHANGE UP (ref 8.4–10.5)
CHLORIDE SERPL-SCNC: 102 MMOL/L — SIGNIFICANT CHANGE UP (ref 96–108)
CO2 SERPL-SCNC: 29 MMOL/L — SIGNIFICANT CHANGE UP (ref 22–31)
CREAT SERPL-MCNC: 1.01 MG/DL — SIGNIFICANT CHANGE UP (ref 0.5–1.3)
EOSINOPHIL # BLD AUTO: 0.1 K/UL — SIGNIFICANT CHANGE UP (ref 0–0.5)
EOSINOPHIL NFR BLD AUTO: 1.1 % — SIGNIFICANT CHANGE UP (ref 0–6)
GAS PNL BLDV: SIGNIFICANT CHANGE UP
GLUCOSE SERPL-MCNC: 184 MG/DL — HIGH (ref 70–99)
HCT VFR BLD CALC: 35.7 % — SIGNIFICANT CHANGE UP (ref 34.5–45)
HGB BLD-MCNC: 11.9 G/DL — SIGNIFICANT CHANGE UP (ref 11.5–15.5)
LYMPHOCYTES # BLD AUTO: 2 K/UL — SIGNIFICANT CHANGE UP (ref 1–3.3)
LYMPHOCYTES # BLD AUTO: 22.2 % — SIGNIFICANT CHANGE UP (ref 13–44)
MAGNESIUM SERPL-MCNC: 1.8 MG/DL — SIGNIFICANT CHANGE UP (ref 1.6–2.6)
MCHC RBC-ENTMCNC: 32.9 PG — SIGNIFICANT CHANGE UP (ref 27–34)
MCHC RBC-ENTMCNC: 33.3 GM/DL — SIGNIFICANT CHANGE UP (ref 32–36)
MCV RBC AUTO: 98.7 FL — SIGNIFICANT CHANGE UP (ref 80–100)
MONOCYTES # BLD AUTO: 0.7 K/UL — SIGNIFICANT CHANGE UP (ref 0–0.9)
MONOCYTES NFR BLD AUTO: 8.4 % — SIGNIFICANT CHANGE UP (ref 2–14)
NEUTROPHILS # BLD AUTO: 6 K/UL — SIGNIFICANT CHANGE UP (ref 1.8–7.4)
NEUTROPHILS NFR BLD AUTO: 68.1 % — SIGNIFICANT CHANGE UP (ref 43–77)
NT-PROBNP SERPL-SCNC: 163 PG/ML — SIGNIFICANT CHANGE UP (ref 0–300)
PHOSPHATE SERPL-MCNC: 2.4 MG/DL — LOW (ref 2.5–4.5)
PLATELET # BLD AUTO: 128 K/UL — LOW (ref 150–400)
POTASSIUM SERPL-MCNC: 4.4 MMOL/L — SIGNIFICANT CHANGE UP (ref 3.5–5.3)
POTASSIUM SERPL-SCNC: 4.4 MMOL/L — SIGNIFICANT CHANGE UP (ref 3.5–5.3)
PROT SERPL-MCNC: 7.2 G/DL — SIGNIFICANT CHANGE UP (ref 6–8.3)
RAPID RVP RESULT: SIGNIFICANT CHANGE UP
RBC # BLD: 3.62 M/UL — LOW (ref 3.8–5.2)
RBC # FLD: 12.6 % — SIGNIFICANT CHANGE UP (ref 10.3–14.5)
SODIUM SERPL-SCNC: 141 MMOL/L — SIGNIFICANT CHANGE UP (ref 135–145)
WBC # BLD: 8.8 K/UL — SIGNIFICANT CHANGE UP (ref 3.8–10.5)
WBC # FLD AUTO: 8.8 K/UL — SIGNIFICANT CHANGE UP (ref 3.8–10.5)

## 2017-11-06 PROCEDURE — 93010 ELECTROCARDIOGRAM REPORT: CPT

## 2017-11-06 PROCEDURE — 99285 EMERGENCY DEPT VISIT HI MDM: CPT | Mod: 25

## 2017-11-06 PROCEDURE — 99223 1ST HOSP IP/OBS HIGH 75: CPT | Mod: AI,GC

## 2017-11-06 PROCEDURE — 71010: CPT | Mod: 26

## 2017-11-06 RX ORDER — INSULIN GLARGINE 100 [IU]/ML
8 INJECTION, SOLUTION SUBCUTANEOUS
Qty: 0 | Refills: 0 | COMMUNITY

## 2017-11-06 RX ORDER — POTASSIUM CHLORIDE 20 MEQ
1 PACKET (EA) ORAL
Qty: 0 | Refills: 0 | COMMUNITY

## 2017-11-06 RX ORDER — MAGNESIUM OXIDE 400 MG ORAL TABLET 241.3 MG
1 TABLET ORAL
Qty: 0 | Refills: 0 | COMMUNITY

## 2017-11-06 RX ORDER — MOMETASONE FUROATE AND FORMOTEROL FUMARATE DIHYDRATE 200; 5 UG/1; UG/1
2 AEROSOL RESPIRATORY (INHALATION)
Qty: 0 | Refills: 0 | COMMUNITY

## 2017-11-06 RX ORDER — SODIUM,POTASSIUM PHOSPHATES 278-250MG
1 POWDER IN PACKET (EA) ORAL ONCE
Qty: 0 | Refills: 0 | Status: COMPLETED | OUTPATIENT
Start: 2017-11-06 | End: 2017-11-06

## 2017-11-06 RX ORDER — MAGNESIUM SULFATE 500 MG/ML
1 VIAL (ML) INJECTION ONCE
Qty: 0 | Refills: 0 | Status: COMPLETED | OUTPATIENT
Start: 2017-11-06 | End: 2017-11-06

## 2017-11-06 RX ORDER — IPRATROPIUM/ALBUTEROL SULFATE 18-103MCG
3 AEROSOL WITH ADAPTER (GRAM) INHALATION ONCE
Qty: 0 | Refills: 0 | Status: COMPLETED | OUTPATIENT
Start: 2017-11-06 | End: 2017-11-06

## 2017-11-06 RX ORDER — FAMOTIDINE 10 MG/ML
1 INJECTION INTRAVENOUS
Qty: 0 | Refills: 0 | COMMUNITY

## 2017-11-06 RX ADMIN — Medication 100 GRAM(S): at 19:35

## 2017-11-06 RX ADMIN — Medication 1 TABLET(S): at 19:31

## 2017-11-06 RX ADMIN — Medication 3 MILLILITER(S): at 17:44

## 2017-11-06 RX ADMIN — Medication 40 MILLIGRAM(S): at 17:45

## 2017-11-06 NOTE — H&P ADULT - ATTENDING COMMENTS
Attending addendum:  Patient seen and examined, I agree with the above assessment and plan with any changes indicated below.    Pretty Dolan is a 72 year old female with a history of HTN, HLD, T2D, HFrEF s/p AICD (not on beta-blocker due to sever asthma), with asthma (not on home O2, childhood onset, intubated x1 in 2016) presents with dyspnea and productive cough.     Patient has sever asthma with prior exacerbations requiring intubations. Reports that typically uses albuterol nebs multiple times/day, however despite this and continuation of her other home medications, continued to have worsening dyspnea, limited exertional capacity, and increased cough/sputum production prompting her to come to the ED. Here, VSS and patient afebrile. Labs overall unremarkable and without leukocytosis. CXR/ECG normal. Given nebs, prednisone, magnesium with improvement in her respiratory symptoms.    I personally interpreted this patient's labs. They were notable for no leukocytosis, normal Hgb, chronic thrombocytopenia. Metabolic panel unremarkable with preserved renal function. VBG without significant retention, normal pH, and normal lactate.  I personally interpreted this patient's imaging. CXR was notable for no effusion or visible opacity.  I personally interpreted this patient's ECG. It showed     I agree with the plan above without any changes. Consider prolonged steroid taper at time of discharge. Attending addendum:  Patient seen and examined, I agree with the above assessment and plan with any changes indicated below.    Pretty Dolan is a 72 year old female with a history of HTN, HLD, T2D, HFrEF s/p AICD (not on beta-blocker due to sever asthma), with asthma (not on home O2, childhood onset, intubated x1 in 2016) presents with dyspnea and productive cough.     Patient has sever asthma with prior exacerbations requiring intubations. Reports that typically uses albuterol nebs multiple times/day, however despite this and continuation of her other home medications, continued to have worsening dyspnea, limited exertional capacity, and increased cough/sputum production prompting her to come to the ED. Here, VSS and patient afebrile. Labs overall unremarkable and without leukocytosis. CXR/ECG normal. Given nebs, prednisone, magnesium with improvement in her respiratory symptoms.    I personally interpreted this patient's labs. They were notable for no leukocytosis, normal Hgb, chronic thrombocytopenia. Metabolic panel unremarkable with preserved renal function. VBG without significant retention, normal pH, and normal lactate.  I personally interpreted this patient's imaging. CXR was notable for no effusion or visible opacity.  I personally interpreted this patient's ECG. It showed ventricular pacing     I agree with the plan above without any changes. Exam and workup consistent with asthma exacerbation, less likely component of CHF. Symptomatic improvement with prednisone, but not yet back to baseline respiratory status. Consider prolonged steroid taper at time of discharge. Reports having Pulm follow up appointment scheduled for next week.

## 2017-11-06 NOTE — H&P ADULT - PROBLEM SELECTOR PLAN 1
Dyspnea and productive cough likely asthma exacerbation in setting of expiratory wheezes and improvement with prednisone and albuterol. HF symptoms of orthopnea and pedal edema at baseline/improved from baseline, no chest pain. CXR normal, no evidence of pneumonia on CXR, no SIRS, RVP-.   - Continue standing duonebs  - Prednisone 40 mg daily, received in ED x1  - Continue home symbicort, albuterol inhaler at bedside, singulair

## 2017-11-06 NOTE — H&P ADULT - NSHPREVIEWOFSYSTEMS_GEN_ALL_CORE
CONSTITUTIONAL: No fever or chills  EYES: No visual changes  ENT: No otalgia, rhinorrhea, sore throat, or dysphagia  NECK: No neck pain or stiffness  RESPIRATORY: +Productive Cough, +dyspnea  CARDIOVASCULAR: + Baseline orthopnea, +Leg swelling better than usual. No chest pain or palpitations.  GASTROINTESTINAL: No abdominal pain, nausea, vomiting, diarrhea, melena, or hematochezia   GENITOURINARY: No dysuria, flank pain, hematuria, or incomplete emptying  NEUROLOGICAL: No numbness or weakness   MUSCULOSKELATAL: No joint pain  HEMATOLOGIC: No bleeding  SKIN: No rash or pruritus  All other review of systems is negative unless indicated above

## 2017-11-06 NOTE — ED PROVIDER NOTE - MEDICAL DECISION MAKING DETAILS
Smii R2: 72F hx as above here with cough, SOB, SILVA c/w asthma exacerbation.  No crackles on exam, LE edema improved per pt.  Suspicion for CHF exacerbation low.  Will treat with duonebs, steroids.  Check basic labs, VBG, BNP.

## 2017-11-06 NOTE — ED ADULT NURSE REASSESSMENT NOTE - NS ED NURSE REASSESS COMMENT FT1
patient received from previous RN. resting in bed. patient is aox3. no indication of pain or discomfort. medicated as ordered. plan of care reviewed with patient

## 2017-11-06 NOTE — H&P ADULT - HISTORY OF PRESENT ILLNESS
72F HTN, HLD, T2D, Non-ischemic cardiomyopathy with HFrEF s/p AICD (not on beta-blocker due to sever asthma), with asthma (not on home O2, childhood onset, intubated x1 in 2016) presents with dyspnea and productive cough. Pt has these symptoms at baseline but they are worse over the past several days. Pt's dyspnea is worse on exertion, can barely walk between rooms in her house. Has two pillow orthopnea. Sputum production has increased. No fever, chills, chest pain, nausea, vomiting.     In the ED, pt was initially tachycardic to the low 100s, RR to 20 but normal O2 saturation, normotensive, afebrile. VS currently T 98.5, HR 88, /68, RR 20. Labs were unremarkable.       	The patient is accompanied by her daughter.  The patient states that she has severe asthma and that for the past few days she is coughing, bringing up white sputum.  She states that she typically has sputum when she coughs at home but that the amount has increased.  The SOB is worsened on exertion.  The patient can usually walk from the bed to the bathroom and then she becomes SOB, but she states that her SILVA has worsened in the past few days.  She sleeps with two pillows at home, and that has not changed.    	The patient reports that she is having all over body cramps including the legs, hands, feet.  She states that she gets these cramps when she has a low potassium or magnesium.  She has not been taking her furosemide.    	She denies fever, chills, chest pain, N/V/D/C, abd pain, changes in vision or hearing, HA, lightheadedness, dizziness.  She reports chronic LE edema but that it is better than usual.  She has taken her flu and pneumonia shots this year, per her daughter. 72F HTN, HLD, T2D, Non-ischemic cardiomyopathy with HFrEF s/p AICD (not on beta-blocker due to sever asthma), with asthma (not on home O2, childhood onset, intubated x1 in 2016) presents with dyspnea and productive cough. Pt has these symptoms at baseline but they are worse over the past several days. Pt's dyspnea is worse on exertion, can barely walk between rooms in her house. Has two pillow orthopnea. Sputum production has increased. No fever, chills, chest pain, nausea, vomiting.     In the ED, pt was initially tachycardic to the low 100s, RR to 20 but normal O2 saturation, normotensive, afebrile. VS currently T 98.5, HR 88, /68, RR 20. Labs were unremarkable. CXR normal, AICD seen. 72F HTN, HLD, T2D, HFrEF s/p AICD (not on beta-blocker due to sever asthma), with asthma (not on home O2, childhood onset, intubated x1 in 2016) presents with dyspnea and productive cough. Pt has these symptoms at baseline but they are worse over the past several days. Pt's dyspnea is worse on exertion, can barely walk between rooms in her house. Usually she is able to drive and go shopping. Sputum production has increased. No fever, chills, chest pain, nausea, vomiting. Has baseline two pillow orthopnea, leg swelling better than usual, takes lasix 20 mg PRN.     In the ED, pt was initially tachycardic to the low 100s, RR to 20 but normal O2 saturation, normotensive, afebrile. VS currently T 98.5, HR 88, /68, RR 20. Labs were unremarkable. CXR normal, AICD seen.

## 2017-11-06 NOTE — H&P ADULT - PSH
AICD (Automatic Cardioverter/Defibrillator) Present  inserted in Aug, 2008. Due for battery change in 1 month. ( [employer]) . Inserted by Dr Duffy  S/P cholecystectomy    S/P Cholecystectomy

## 2017-11-06 NOTE — H&P ADULT - PROBLEM SELECTOR PLAN 2
S/P AICD, EF 35% in 2016 but pharm stress test around the same to EF 50s.  - Recheck TTE  - Assess volume status daily, consider lasix if pedal edema worsening  - Continue enalaprile 10 mg q12h  - Not on BB due to severe asthma  - Continue ASA

## 2017-11-06 NOTE — H&P ADULT - NSHPPHYSICALEXAM_GEN_ALL_CORE
General: Appears comfortable, ambulating  Skin: Warm and dry  Neuro: AAOx3, nonfocal  HEENT: PERRL, EOMI, no oral lesions  Neck: Full range of motion, no JVD  Lungs: Wheezes b/l, adequate air movement, no accessory muscle use  Heart: Regular rate and rhythm, no murmurs  Abdomen: +BS, soft, nontender, nondistended  Extremities: 2+ pedal edema 1/3 way to knee  Lymph: no cervical lymphadenopathy  Psych: normal affect

## 2017-11-06 NOTE — ED ADULT NURSE NOTE - OBJECTIVE STATEMENT
72 yr old female to ed c/o increased sob with productive cough, white secretions. Increased sob on exertion +2 pedal edema. Denies chest pain Denies chills fever or bodyaches Has cardiac hx with pacemaker and defibrillator. Afebrile. Denies recent travel. Daughter at bedside.

## 2017-11-06 NOTE — H&P ADULT - ASSESSMENT
72F HTN, HLD, T2D, HFrEF s/p AICD (not on beta-blocker due to sever asthma), with asthma (not on home O2, childhood onset, intubated x1 in 2016) presents with dyspnea and productive cough improving with nebs, expiratory wheezes on exam, likely asthma exacerbation.

## 2017-11-06 NOTE — ED PROVIDER NOTE - ATTENDING CONTRIBUTION TO CARE
attending Jorge: 72yF hx as above here with cough, SOB, SILVA c/w asthma exacerbation.  No crackles on exam, LE edema improved per pt.  Less likely CHF exacerbation Will treat with duonebs, steroids, labs, including vbg, pro-BNP, ekg, cxr and admission.

## 2017-11-06 NOTE — ED PROVIDER NOTE - PHYSICAL EXAMINATION
T(C): 36.6 (11-06-17 @ 16:38), Max: 36.6 (11-06-17 @ 16:38)  HR: 106 (11-06-17 @ 16:38) (106 - 106)  BP: 148/77 (11-06-17 @ 16:38) (148/77 - 148/77)  RR: 16 (11-06-17 @ 16:38) (16 - 16)  SpO2: 98% (11-06-17 @ 16:38) (98% - 98%)    Gen: awake, alert  HENT: neck soft / supple; MMM  Lymph: no LAD noted in neck  Eye: PERRL, sclerae anicteric  CV: normal rate, soft heart sounds  Pulm: diffuse expiratory wheeze at all lung fields bilaterally, modest air movement, no crackles noted  Abd: +BS, soft, NT, ND  Skin: warm, dry  Ext: 1+ pitting LE edema up calf  Neuro: answering questions appropriately, following commands appropriately, recent and remote memory intact  Psych: normal mood / affect

## 2017-11-06 NOTE — H&P ADULT - NSHPLABSRESULTS_GEN_ALL_CORE
Labs and imaging personally reviewed  No leukocytosis, baseline thrombocytopenia to 128, Cr near baseline of 1  CXR clear  EKG sinus rhythm no ST changes Labs and imaging personally reviewed  No leukocytosis, baseline thrombocytopenia to 128, Cr near baseline of 1  CXR clear

## 2017-11-06 NOTE — ED ADULT NURSE NOTE - PSH
AICD (Automatic Cardioverter/Defibrillator) Present  inserted in Aug, 2008. Due for battery change in 1 month. ( [employer]) . Inserted by Dr Duffy  S/P cholecystectomy    S/P Cholecystectomy

## 2017-11-06 NOTE — ED PROVIDER NOTE - PSH
AICD (Automatic Cardioverter/Defibrillator) Present  inserted in Aug, 2008. Due for battery change in 1 month. ( [employer]) . Inserted by Dr Duffy  S/P cholecystectomy    S/P Cholecystectomy

## 2017-11-06 NOTE — ED PROVIDER NOTE - OBJECTIVE STATEMENT
72F w/ asthma (childhood onset, intubated x1 2016), NICM with HFrEF s/p AICD (not on beta blocker due to severe asthma), DM2 on insulin, HTN, HLD here for SOB. 72F h/o asthma (childhood onset, intubated x1 2016), NICM with HFrEF s/p AICD (not on beta blocker due to severe asthma), DM2 on insulin, HTN, HLD here for SOB. 72F h/o asthma (childhood onset, intubated x1 2016), NICM with HFrEF s/p AICD (not on beta blocker due to severe asthma), DM2 on insulin, HTN, HLD here for SOB.      Cards / HF: Balaji Hobson 72F h/o asthma (not on home O2, childhood onset, intubated x1 2016), NICM with HFrEF s/p AICD (not on beta blocker due to severe asthma), DM2 on insulin, HTN, HLD here for SOB.    Patient states that she has severe asthma and that for the past few days she is coughing, bringing up white sputum.  She states that she typically has sputum when she coughs at home but that the amount has increased.  The SOB is worsened on exertion.  The patient can usually walk from the bed to the bathroom and then she becomes SOB.  She sleeps with two pillows at home, and that has not changed.    She reports that she is having all over body cramps including the legs, hands, feet.  She has not been taking her furosemide.    She denies fever, chills, chest pain, N/V/D/C, abd pain, changes in vision or hearing, HA, lightheadedness, dizziness.  She reports LE edema but that it is better than usual.    PMD: Neena Comer  Pulm: Kirill Daily  Cards / HF: Balaji Hobson 72F h/o asthma (not on home O2, childhood onset, intubated x1 2016), NICM with HFrEF s/p AICD (not on beta blocker due to severe asthma), DM2 on insulin, HTN, HLD here for SOB.    Patient states that she has severe asthma and that for the past few days she is coughing, bringing up white sputum.  She states that she typically has sputum when she coughs at home but that the amount has increased.  The SOB is worsened on exertion.  The patient can usually walk from the bed to the bathroom and then she becomes SOB.  She sleeps with two pillows at home, and that has not changed.    She reports that she is having all over body cramps including the legs, hands, feet.  She has not been taking her furosemide.    She denies fever, chills, chest pain, N/V/D/C, abd pain, changes in vision or hearing, HA, lightheadedness, dizziness.  She reports LE edema but that it is better than usual.  She has taken her flu and pneumonia shots this year.    PMD: Neena Comer  Pulm: Kirill Daily  Cards / HF: Balaji Hobson 72F h/o asthma (not on home O2, childhood onset, intubated x1 2016), NICM with HFrEF s/p AICD (not on beta blocker due to severe asthma), DM2 on insulin, HTN, HLD here for SOB.    Patient states that she has severe asthma and that for the past few days she is coughing, bringing up white sputum.  She states that she typically has sputum when she coughs at home but that the amount has increased.  The SOB is worsened on exertion.  The patient can usually walk from the bed to the bathroom and then she becomes SOB.  She sleeps with two pillows at home, and that has not changed.    She reports that she is having all over body cramps including the legs, hands, feet.  She states that she gets these cramps when she has a low potassium or magnesium.  She has not been taking her furosemide.    She denies fever, chills, chest pain, N/V/D/C, abd pain, changes in vision or hearing, HA, lightheadedness, dizziness.  She reports LE edema but that it is better than usual.  She has taken her flu and pneumonia shots this year.    PMD: Neena Comer  Pulm: Kirill Daily  Cards / HF: Balaji Hobson 72F h/o asthma (not on home O2, childhood onset, intubated x1 2016), NICM with HFrEF s/p AICD (not on beta blocker due to severe asthma), DM2 on insulin, HTN, HLD here for SOB.    The patient is accompanied by her daughter.  The patient states that she has severe asthma and that for the past few days she is coughing, bringing up white sputum.  She states that she typically has sputum when she coughs at home but that the amount has increased.  The SOB is worsened on exertion.  The patient can usually walk from the bed to the bathroom and then she becomes SOB, but she states that her SILVA has worsened in the past few days.  She sleeps with two pillows at home, and that has not changed.    The patient reports that she is having all over body cramps including the legs, hands, feet.  She states that she gets these cramps when she has a low potassium or magnesium.  She has not been taking her furosemide.    She denies fever, chills, chest pain, N/V/D/C, abd pain, changes in vision or hearing, HA, lightheadedness, dizziness.  She reports chronic LE edema but that it is better than usual.  She has taken her flu and pneumonia shots this year, per her daughter.    PMD: Neena Comer  Pulm: Kirill Daily  Cards / HF: Balaji Hobson

## 2017-11-07 DIAGNOSIS — J45.901 UNSPECIFIED ASTHMA WITH (ACUTE) EXACERBATION: ICD-10-CM

## 2017-11-07 DIAGNOSIS — K57.92 DIVERTICULITIS OF INTESTINE, PART UNSPECIFIED, WITHOUT PERFORATION OR ABSCESS WITHOUT BLEEDING: ICD-10-CM

## 2017-11-07 DIAGNOSIS — I10 ESSENTIAL (PRIMARY) HYPERTENSION: ICD-10-CM

## 2017-11-07 DIAGNOSIS — E11.9 TYPE 2 DIABETES MELLITUS WITHOUT COMPLICATIONS: ICD-10-CM

## 2017-11-07 DIAGNOSIS — I50.20 UNSPECIFIED SYSTOLIC (CONGESTIVE) HEART FAILURE: ICD-10-CM

## 2017-11-07 DIAGNOSIS — N18.2 CHRONIC KIDNEY DISEASE, STAGE 2 (MILD): ICD-10-CM

## 2017-11-07 DIAGNOSIS — Z29.9 ENCOUNTER FOR PROPHYLACTIC MEASURES, UNSPECIFIED: ICD-10-CM

## 2017-11-07 DIAGNOSIS — D69.6 THROMBOCYTOPENIA, UNSPECIFIED: ICD-10-CM

## 2017-11-07 LAB — IGE SERPL-ACNC: 87 IU/ML — SIGNIFICANT CHANGE UP (ref 0–100)

## 2017-11-07 PROCEDURE — 99233 SBSQ HOSP IP/OBS HIGH 50: CPT | Mod: GC

## 2017-11-07 RX ORDER — INSULIN LISPRO 100/ML
VIAL (ML) SUBCUTANEOUS AT BEDTIME
Qty: 0 | Refills: 0 | Status: DISCONTINUED | OUTPATIENT
Start: 2017-11-07 | End: 2017-11-09

## 2017-11-07 RX ORDER — DEXTROSE 50 % IN WATER 50 %
12.5 SYRINGE (ML) INTRAVENOUS ONCE
Qty: 0 | Refills: 0 | Status: DISCONTINUED | OUTPATIENT
Start: 2017-11-07 | End: 2017-11-09

## 2017-11-07 RX ORDER — BUDESONIDE AND FORMOTEROL FUMARATE DIHYDRATE 160; 4.5 UG/1; UG/1
2 AEROSOL RESPIRATORY (INHALATION)
Qty: 0 | Refills: 0 | Status: DISCONTINUED | OUTPATIENT
Start: 2017-11-07 | End: 2017-11-09

## 2017-11-07 RX ORDER — DOCUSATE SODIUM 100 MG
100 CAPSULE ORAL
Qty: 0 | Refills: 0 | Status: DISCONTINUED | OUTPATIENT
Start: 2017-11-07 | End: 2017-11-08

## 2017-11-07 RX ORDER — HEPARIN SODIUM 5000 [USP'U]/ML
5000 INJECTION INTRAVENOUS; SUBCUTANEOUS EVERY 8 HOURS
Qty: 0 | Refills: 0 | Status: DISCONTINUED | OUTPATIENT
Start: 2017-11-07 | End: 2017-11-09

## 2017-11-07 RX ORDER — INSULIN GLARGINE 100 [IU]/ML
8 INJECTION, SOLUTION SUBCUTANEOUS AT BEDTIME
Qty: 0 | Refills: 0 | Status: DISCONTINUED | OUTPATIENT
Start: 2017-11-07 | End: 2017-11-08

## 2017-11-07 RX ORDER — DEXTROSE 50 % IN WATER 50 %
1 SYRINGE (ML) INTRAVENOUS ONCE
Qty: 0 | Refills: 0 | Status: DISCONTINUED | OUTPATIENT
Start: 2017-11-07 | End: 2017-11-09

## 2017-11-07 RX ORDER — SODIUM CHLORIDE 9 MG/ML
1000 INJECTION, SOLUTION INTRAVENOUS
Qty: 0 | Refills: 0 | Status: DISCONTINUED | OUTPATIENT
Start: 2017-11-07 | End: 2017-11-09

## 2017-11-07 RX ORDER — DEXTROSE 50 % IN WATER 50 %
25 SYRINGE (ML) INTRAVENOUS ONCE
Qty: 0 | Refills: 0 | Status: DISCONTINUED | OUTPATIENT
Start: 2017-11-07 | End: 2017-11-09

## 2017-11-07 RX ORDER — ASPIRIN/CALCIUM CARB/MAGNESIUM 324 MG
81 TABLET ORAL DAILY
Qty: 0 | Refills: 0 | Status: DISCONTINUED | OUTPATIENT
Start: 2017-11-07 | End: 2017-11-09

## 2017-11-07 RX ORDER — HEPARIN SODIUM 5000 [USP'U]/ML
5000 INJECTION INTRAVENOUS; SUBCUTANEOUS EVERY 8 HOURS
Qty: 0 | Refills: 0 | Status: DISCONTINUED | OUTPATIENT
Start: 2017-11-07 | End: 2017-11-07

## 2017-11-07 RX ORDER — MONTELUKAST 4 MG/1
10 TABLET, CHEWABLE ORAL DAILY
Qty: 0 | Refills: 0 | Status: DISCONTINUED | OUTPATIENT
Start: 2017-11-07 | End: 2017-11-09

## 2017-11-07 RX ORDER — GLUCAGON INJECTION, SOLUTION 0.5 MG/.1ML
1 INJECTION, SOLUTION SUBCUTANEOUS ONCE
Qty: 0 | Refills: 0 | Status: DISCONTINUED | OUTPATIENT
Start: 2017-11-07 | End: 2017-11-09

## 2017-11-07 RX ORDER — SENNA PLUS 8.6 MG/1
2 TABLET ORAL AT BEDTIME
Qty: 0 | Refills: 0 | Status: DISCONTINUED | OUTPATIENT
Start: 2017-11-07 | End: 2017-11-09

## 2017-11-07 RX ORDER — ALBUTEROL 90 UG/1
2 AEROSOL, METERED ORAL EVERY 6 HOURS
Qty: 0 | Refills: 0 | Status: DISCONTINUED | OUTPATIENT
Start: 2017-11-07 | End: 2017-11-09

## 2017-11-07 RX ORDER — INSULIN LISPRO 100/ML
VIAL (ML) SUBCUTANEOUS
Qty: 0 | Refills: 0 | Status: DISCONTINUED | OUTPATIENT
Start: 2017-11-07 | End: 2017-11-09

## 2017-11-07 RX ORDER — ATORVASTATIN CALCIUM 80 MG/1
20 TABLET, FILM COATED ORAL AT BEDTIME
Qty: 0 | Refills: 0 | Status: DISCONTINUED | OUTPATIENT
Start: 2017-11-07 | End: 2017-11-09

## 2017-11-07 RX ORDER — IPRATROPIUM/ALBUTEROL SULFATE 18-103MCG
3 AEROSOL WITH ADAPTER (GRAM) INHALATION EVERY 6 HOURS
Qty: 0 | Refills: 0 | Status: DISCONTINUED | OUTPATIENT
Start: 2017-11-07 | End: 2017-11-08

## 2017-11-07 RX ADMIN — Medication 40 MILLIGRAM(S): at 11:05

## 2017-11-07 RX ADMIN — ATORVASTATIN CALCIUM 20 MILLIGRAM(S): 80 TABLET, FILM COATED ORAL at 22:23

## 2017-11-07 RX ADMIN — INSULIN GLARGINE 8 UNIT(S): 100 INJECTION, SOLUTION SUBCUTANEOUS at 22:23

## 2017-11-07 RX ADMIN — Medication 10 MILLIGRAM(S): at 05:45

## 2017-11-07 RX ADMIN — Medication 4: at 13:29

## 2017-11-07 RX ADMIN — Medication 3 MILLILITER(S): at 05:41

## 2017-11-07 RX ADMIN — HEPARIN SODIUM 5000 UNIT(S): 5000 INJECTION INTRAVENOUS; SUBCUTANEOUS at 05:45

## 2017-11-07 RX ADMIN — Medication 3 MILLILITER(S): at 00:14

## 2017-11-07 RX ADMIN — Medication 10 MILLIGRAM(S): at 17:55

## 2017-11-07 RX ADMIN — INSULIN GLARGINE 8 UNIT(S): 100 INJECTION, SOLUTION SUBCUTANEOUS at 01:02

## 2017-11-07 RX ADMIN — Medication 3 MILLILITER(S): at 17:22

## 2017-11-07 RX ADMIN — Medication 8: at 17:55

## 2017-11-07 RX ADMIN — HEPARIN SODIUM 5000 UNIT(S): 5000 INJECTION INTRAVENOUS; SUBCUTANEOUS at 13:29

## 2017-11-07 RX ADMIN — Medication 100 MILLIGRAM(S): at 23:24

## 2017-11-07 RX ADMIN — SENNA PLUS 2 TABLET(S): 8.6 TABLET ORAL at 23:24

## 2017-11-07 RX ADMIN — Medication 3 MILLILITER(S): at 11:44

## 2017-11-07 RX ADMIN — Medication 2: at 09:13

## 2017-11-07 RX ADMIN — HEPARIN SODIUM 5000 UNIT(S): 5000 INJECTION INTRAVENOUS; SUBCUTANEOUS at 22:23

## 2017-11-07 RX ADMIN — BUDESONIDE AND FORMOTEROL FUMARATE DIHYDRATE 2 PUFF(S): 160; 4.5 AEROSOL RESPIRATORY (INHALATION) at 17:22

## 2017-11-07 RX ADMIN — Medication 1: at 22:24

## 2017-11-07 RX ADMIN — Medication 81 MILLIGRAM(S): at 11:05

## 2017-11-07 RX ADMIN — MONTELUKAST 10 MILLIGRAM(S): 4 TABLET, CHEWABLE ORAL at 11:05

## 2017-11-07 RX ADMIN — BUDESONIDE AND FORMOTEROL FUMARATE DIHYDRATE 2 PUFF(S): 160; 4.5 AEROSOL RESPIRATORY (INHALATION) at 05:45

## 2017-11-07 NOTE — PROGRESS NOTE ADULT - ATTENDING COMMENTS
Asthma exacerbation- improving, has good air entry now.   No infection-- CXR clear (I reviewed films)  --bedside peak flow meter  --Resp therapy for MDI and asthma education  --Cont B-agonist and ipratropium nebs, cont prednisone- no taper yet.  Restart long acting inhaler  meds    Is c/o muscle cramps, none since yesterday.  Likely hypokalemia, now replete--continue to monitor electrolytes    Chronic HFpEF, well compensated now.  No clear need for lasix right now, cont ARB

## 2017-11-07 NOTE — PROGRESS NOTE ADULT - SUBJECTIVE AND OBJECTIVE BOX
MEDICINE ACCEPT NOTE    Joselin Dawn MD  Medicine Team 1  Pager: [phone] (NS)/[phone] ([facility])    Mon-Fri: pager covered by day team 7am-7pm;  ***Academic conferences M-F 8AM-9AM and 12PM-1PM- page ONLY if URGENT***  Sat/Sun: see chart, primary physican assigned available 7AM-12PM  Sat/Sun: cross coverage 12pm-7pm NS- page [phone] for Team 1-4, CMA/CMB covered by team, [facility]- pager forwarded to covering resident    Night Coverage 7PM-7AM: page [phone] for Team 1-3, [phone] for CMA/CMB/Team 4    CC: Patient is a 72y old  Female who presents with a chief complaint of sob  x few days (06 Nov 2017 23:17)    PROVIDERS:    HPI/ROS:    Allergies    Coreg (Other)  digoxin (Other; Short breath (Mild to Mod))  penicillins (Hives)  Solu-Medrol (Other)    Intolerances    	    PAST MEDICAL & SURGICAL HISTORY:  Diverticulitis  Cardiomyopathy  Kidney stone  COPD (chronic obstructive pulmonary disease)  Cardiac Pacemaker  HTN - Hypertension  Gout  Diabetes  Congestive Heart Failure  Asthma  S/P cholecystectomy  AICD (Automatic Cardioverter/Defibrillator) Present: inserted in Aug, 2008. Due for battery change in 1 month. ( [employer]) . Inserted by Dr Duffy  S/P Cholecystectomy      FAMILY HISTORY:  No pertinent family history in first degree relatives      SOCIAL HISTORY  OCCUPATION:  TOBACCO USE:  ALCOHOL USE:  RECREATIONAL DRUG USE:  HIGH RISK SEXUAL ACTIVITY:    MEDICATIONS:  aspirin enteric coated 81 milliGRAM(s) Oral daily  enalapril 10 milliGRAM(s) Oral two times a day  heparin  Injectable 5000 Unit(s) SubCutaneous every 8 hours      ALBUTerol    90 MICROgram(s) HFA Inhaler 2 Puff(s) Inhalation every 6 hours PRN  ALBUTerol/ipratropium for Nebulization 3 milliLiter(s) Nebulizer every 6 hours  buDESOnide 160 MICROgram(s)/formoterol 4.5 MICROgram(s) Inhaler 2 Puff(s) Inhalation two times a day  montelukast 10 milliGRAM(s) Oral daily        atorvastatin 20 milliGRAM(s) Oral at bedtime  dextrose 50% Injectable 12.5 Gram(s) IV Push once  dextrose 50% Injectable 25 Gram(s) IV Push once  dextrose 50% Injectable 25 Gram(s) IV Push once  dextrose Gel 1 Dose(s) Oral once PRN  glucagon  Injectable 1 milliGRAM(s) IntraMuscular once PRN  insulin glargine Injectable (LANTUS) 8 Unit(s) SubCutaneous at bedtime  insulin lispro (HumaLOG) corrective regimen sliding scale   SubCutaneous three times a day before meals  insulin lispro (HumaLOG) corrective regimen sliding scale   SubCutaneous at bedtime  predniSONE   Tablet 40 milliGRAM(s) Oral daily    dextrose 5%. 1000 milliLiter(s) IV Continuous <Continuous>      PHYSICAL EXAM:  T(C): 36.2 (11-07-17 @ 05:42), Max: 37.1 (11-06-17 @ 19:42)  HR: 85 (11-07-17 @ 05:42) (85 - 106)  BP: 146/81 (11-07-17 @ 05:42) (115/68 - 160/88)  RR: 18 (11-07-17 @ 05:42) (16 - 20)  SpO2: 97% (11-06-17 @ 23:08) (97% - 98%)  Wt(kg): --  Daily Height in cm: 157.48 (06 Nov 2017 23:08)    Daily   I&O's Summary    TELEMETRY:     Appearance: NAD	  HEENT:   Normal oral mucosa, PERRL, EOMI	  Lymphatic: No lymphadenopathy  Cardiovascular: Normal S1 S2, No JVD, No murmurs, No edema  Respiratory: Lungs clear to auscultation	  Psychiatry: A & O x 3, Mood & affect appropriate  Gastrointestinal:  Soft, Non-tender, + BS	  Skin: No rashes, No ecchymoses, No cyanosis	  Neurologic: Non-focal  MSK/Extremities: Normal range of motion, No clubbing, cyanosis or edema  Vascular: Peripheral pulses palpable 2+ bilaterally    LABS:	 	                        11.9   8.8   )-----------( 128      ( 06 Nov 2017 18:29 )             35.7     11-06    141  |  102  |  21  ----------------------------<  184<H>  4.4   |  29  |  1.01    Ca    9.6      06 Nov 2017 17:32  Phos  2.4     11-06  Mg     1.8     11-06    TPro  7.2  /  Alb  4.0  /  TBili  0.4  /  DBili  x   /  AST  17  /  ALT  22  /  AlkPhos  127<H>  11-06      proBNP: Serum Pro-Brain Natriuretic Peptide: 163 pg/mL (11-06 @ 17:32)    Lipid Profile:   HgA1c:   TSH:   FS: CAPILLARY BLOOD GLUCOSE      POCT Blood Glucose.: 296 mg/dL (07 Nov 2017 00:05)    BCX/UCX:     CARDIAC MARKERS:     Rapid Respiratory Viral Panel (11.06.17 @ 18:13)    Rapid RVP Result: NotDetec: The FilmArray RVP Rapid uses polymerase chain reaction (PCR) and melt  curve analysis to screen for adenovirus; coronavirus HKU1, NL63, 229E,  OC43; human metapneumovirus (hMPV); human enterovirus/rhinovirus  (Entero/RV); influenza A; influenza A/H1;influenza A/H3; influenza  A/H1-2009; influenza B; parainfluenza viruses 1, 2, 3, 4; respiratory  syncytial virus; Bordetella pertussis; Mycoplasma pneumoniae; and  Chlamydophila pneumoniae.        UA:     COAGS:    	    ECG:  	  RADIOLOGY:	      Consult notes reviewed: MEDICINE ACCEPT NOTE    Joselin Dawn MD  Medicine Team 1  Pager: [phone] (NS)/[phone] ([facility])    Mon-Fri: pager covered by day team 7am-7pm;  ***Academic conferences M-F 8AM-9AM and 12PM-1PM- page ONLY if URGENT***  Sat/Sun: see chart, primary physican assigned available 7AM-12PM  Sat/Sun: cross coverage 12pm-7pm NS- page [phone] for Team 1-4, CMA/CMB covered by team, [facility]- pager forwarded to covering resident    Night Coverage 7PM-7AM: page [phone] for Team 1-3, [phone] for CMA/CMB/Team 4    CC: Patient is a 72y old  Female who presents with a chief complaint of sob  x few days (06 Nov 2017 23:17)    PROVIDERS:  PMD: Neena Comer  Pulm: Kirill Daily  Cards/HF: Balaji Hobson    HPI/ROS: 73 yo F w/PMHx HTN, HLD, T2DM, CHFrEF s/p AICD (not on BB due to asthma), with asthma (not on home O2, childhood onset, intubated x 1 s/p trach w/removal in 2016), presents w/progressive dyspnea and productive cough of white sputum. Pt reports they have worsened over past several days, minimally helped with her nebulizers. Her dyspnea is worse on exertion, and she cannot walk to different rooms in house. Also reporting B/L hand, knee, foot, and thigh cramps; reports this happens when her "potassium and magnesium" are low - on supplementation at home. Denies fevers, chills, CP, N/V. Has baseline orthopnea w/mild leg swelling B/L, takes lasix 20 PRN. Reports daughter has been sick with "cold and sniffles." Denies recent travel, change in meds, change in diet, no exposure to allergens/carpeting/pets.    In ED, initially tachycardic, O2sat normal. CXR performed, pt placed on home meds as well as prednisone.    Constitutional: denies fevers, chills, sweats, weight loss  HEENT: denies changes in vision, rhinorrhea, tussis, tinnitus, discharge from ears  CV: denies palpitations, CP  Pulm: +mild SILVA and SOB  GI: denies abdominal pain, N/V, diarrhea/constipation, hematochezia/melena  : denies dysuria/hematuria  Skin: denies new lesions, peripheral swelling  Back/MSK: denies new muscle/joint aches, denies cramps  Neuro/psych: denies neurologic deficits, seizure-like activity, dizziness, paresthesias, loss of sensation, incontinence  Heme/lymph: denies new bruises/bleeds, lymphadenopathy    Allergies  Coreg (Other)  digoxin (Other; Short breath (Mild to Mod))  penicillins (Hives)  Solu-Medrol (Other)  Intolerances	    PAST MEDICAL & SURGICAL HISTORY:  Diverticulitis  Cardiomyopathy  Kidney stone  COPD (chronic obstructive pulmonary disease)  Cardiac Pacemaker  HTN - Hypertension  Gout  Diabetes  Congestive Heart Failure  Asthma  S/P cholecystectomy  AICD (Automatic Cardioverter/Defibrillator) Present: inserted in Aug, 2008. Due for battery change in 1 month. ( [employer]) . Inserted by Dr Duffy  S/P Cholecystectomy    FAMILY HISTORY:  No pertinent family history in first degree relatives    SOCIAL HISTORY  TOBACCO USE: denies history  ALCOHOL USE: denies history  RECREATIONAL DRUG USE: denies history  HIGH RISK SEXUAL ACTIVITY: denies history, not sexually active, no hx STI    MEDICATIONS:  aspirin enteric coated 81 milliGRAM(s) Oral daily  enalapril 10 milliGRAM(s) Oral two times a day  heparin  Injectable 5000 Unit(s) SubCutaneous every 8 hours  ALBUTerol    90 MICROgram(s) HFA Inhaler 2 Puff(s) Inhalation every 6 hours PRN  ALBUTerol/ipratropium for Nebulization 3 milliLiter(s) Nebulizer every 6 hours  buDESOnide 160 MICROgram(s)/formoterol 4.5 MICROgram(s) Inhaler 2 Puff(s) Inhalation two times a day  montelukast 10 milliGRAM(s) Oral daily  atorvastatin 20 milliGRAM(s) Oral at bedtime  dextrose 50% Injectable 12.5 Gram(s) IV Push once  dextrose 50% Injectable 25 Gram(s) IV Push once  dextrose 50% Injectable 25 Gram(s) IV Push once  dextrose Gel 1 Dose(s) Oral once PRN  glucagon  Injectable 1 milliGRAM(s) IntraMuscular once PRN  insulin glargine Injectable (LANTUS) 8 Unit(s) SubCutaneous at bedtime  insulin lispro (HumaLOG) corrective regimen sliding scale   SubCutaneous three times a day before meals  insulin lispro (HumaLOG) corrective regimen sliding scale   SubCutaneous at bedtime  predniSONE   Tablet 40 milliGRAM(s) Oral daily  dextrose 5%. 1000 milliLiter(s) IV Continuous <Continuous>    PHYSICAL EXAM:  T(C): 36.2 (11-07-17 @ 05:42), Max: 37.1 (11-06-17 @ 19:42)  HR: 85 (11-07-17 @ 05:42) (85 - 106)  BP: 146/81 (11-07-17 @ 05:42) (115/68 - 160/88)  RR: 18 (11-07-17 @ 05:42) (16 - 20)  SpO2: 97% (11-06-17 @ 23:08) (97% - 98%)  Wt(kg): --  Daily Height in cm: 157.48 (06 Nov 2017 23:08)    Daily   I&O's Summary    Appearance: elderly black female in NAD, sitting on edge of bed	  HEENT:   Normal oral mucosa, PERRL, EOMI, mild scarring noted at trach site, +raspy voice	  Lymphatic: No lymphadenopathy  Cardiovascular: Normal S1 S2, No JVD, No murmurs, No edema  Respiratory: +mild expiratory wheezes at lung bases  Psychiatry: A & O x 3, Mood & affect appropriate  Gastrointestinal:  Soft, Non-tender, + BS	  Skin: No rashes, No ecchymoses, No cyanosis	  Neurologic: Non-focal  MSK/Extremities: Normal range of motion, No clubbing, cyanosis, 1+ pitting edema to calves B/L  Vascular: Peripheral pulses palpable 2+ bilaterally    LABS:	 	                        11.9   8.8   )-----------( 128      ( 06 Nov 2017 18:29 )             35.7     11-06    141  |  102  |  21  ----------------------------<  184<H>  4.4   |  29  |  1.01    Ca    9.6      06 Nov 2017 17:32  Phos  2.4     11-06  Mg     1.8     11-06    TPro  7.2  /  Alb  4.0  /  TBili  0.4  /  DBili  x   /  AST  17  /  ALT  22  /  AlkPhos  127<H>  11-06      proBNP: Serum Pro-Brain Natriuretic Peptide: 163 pg/mL (11-06 @ 17:32)    FS: CAPILLARY BLOOD GLUCOSE      POCT Blood Glucose.: 296 mg/dL (07 Nov 2017 00:05)        Rapid Respiratory Viral Panel (11.06.17 @ 18:13)    Rapid RVP Result: NotDetec: The FilmArray RVP Rapid uses polymerase chain reaction (PCR) and melt  curve analysis to screen for adenovirus; coronavirus HKU1, NL63, 229E,  OC43; human metapneumovirus (hMPV); human enterovirus/rhinovirus  (Entero/RV); influenza A; influenza A/H1;influenza A/H3; influenza  A/H1-2009; influenza B; parainfluenza viruses 1, 2, 3, 4; respiratory  syncytial virus; Bordetella pertussis; Mycoplasma pneumoniae; and  Chlamydophila pneumoniae.    RADIOLOGY:	  11/6/17 CXR:    INTERPRETATION:  CLINICAL INFORMATION: Dyspnea, asthma.    EXAM: AP portable chest radiograph from 11/6/2017 at 1737 hours.    COMPARISON: Chest radiograph 9/17/2017.    FINDINGS:  No focal consolidations.  No pleural effusion or pneumothorax.  Cardiomediastinal silhouette is not accurately evaluated in this   projection.  Left chest wall AICD.  Degenerative changes of thoracic spine.    IMPRESSION:  No focal consolidation.

## 2017-11-08 PROCEDURE — 99233 SBSQ HOSP IP/OBS HIGH 50: CPT | Mod: GC

## 2017-11-08 RX ORDER — DOCUSATE SODIUM 100 MG
100 CAPSULE ORAL THREE TIMES A DAY
Qty: 0 | Refills: 0 | Status: DISCONTINUED | OUTPATIENT
Start: 2017-11-08 | End: 2017-11-09

## 2017-11-08 RX ORDER — PANTOPRAZOLE SODIUM 20 MG/1
40 TABLET, DELAYED RELEASE ORAL
Qty: 0 | Refills: 0 | Status: DISCONTINUED | OUTPATIENT
Start: 2017-11-08 | End: 2017-11-08

## 2017-11-08 RX ORDER — INSULIN LISPRO 100/ML
3 VIAL (ML) SUBCUTANEOUS
Qty: 0 | Refills: 0 | Status: DISCONTINUED | OUTPATIENT
Start: 2017-11-08 | End: 2017-11-09

## 2017-11-08 RX ORDER — INSULIN GLARGINE 100 [IU]/ML
10 INJECTION, SOLUTION SUBCUTANEOUS AT BEDTIME
Qty: 0 | Refills: 0 | Status: DISCONTINUED | OUTPATIENT
Start: 2017-11-08 | End: 2017-11-09

## 2017-11-08 RX ORDER — TIOTROPIUM BROMIDE 18 UG/1
1 CAPSULE ORAL; RESPIRATORY (INHALATION) DAILY
Qty: 0 | Refills: 0 | Status: DISCONTINUED | OUTPATIENT
Start: 2017-11-08 | End: 2017-11-09

## 2017-11-08 RX ADMIN — BUDESONIDE AND FORMOTEROL FUMARATE DIHYDRATE 2 PUFF(S): 160; 4.5 AEROSOL RESPIRATORY (INHALATION) at 18:38

## 2017-11-08 RX ADMIN — Medication 10 MILLIGRAM(S): at 18:03

## 2017-11-08 RX ADMIN — Medication 3 MILLILITER(S): at 06:47

## 2017-11-08 RX ADMIN — SENNA PLUS 2 TABLET(S): 8.6 TABLET ORAL at 23:10

## 2017-11-08 RX ADMIN — Medication 10 MILLIGRAM(S): at 06:13

## 2017-11-08 RX ADMIN — Medication 100 MILLIGRAM(S): at 18:20

## 2017-11-08 RX ADMIN — Medication 3 UNIT(S): at 13:42

## 2017-11-08 RX ADMIN — MONTELUKAST 10 MILLIGRAM(S): 4 TABLET, CHEWABLE ORAL at 11:20

## 2017-11-08 RX ADMIN — Medication 2: at 09:12

## 2017-11-08 RX ADMIN — Medication 40 MILLIGRAM(S): at 06:13

## 2017-11-08 RX ADMIN — Medication 2: at 13:43

## 2017-11-08 RX ADMIN — Medication 3 MILLILITER(S): at 00:36

## 2017-11-08 RX ADMIN — ATORVASTATIN CALCIUM 20 MILLIGRAM(S): 80 TABLET, FILM COATED ORAL at 23:11

## 2017-11-08 RX ADMIN — BUDESONIDE AND FORMOTEROL FUMARATE DIHYDRATE 2 PUFF(S): 160; 4.5 AEROSOL RESPIRATORY (INHALATION) at 06:47

## 2017-11-08 RX ADMIN — Medication 2: at 18:01

## 2017-11-08 RX ADMIN — HEPARIN SODIUM 5000 UNIT(S): 5000 INJECTION INTRAVENOUS; SUBCUTANEOUS at 06:14

## 2017-11-08 RX ADMIN — INSULIN GLARGINE 10 UNIT(S): 100 INJECTION, SOLUTION SUBCUTANEOUS at 23:10

## 2017-11-08 RX ADMIN — HEPARIN SODIUM 5000 UNIT(S): 5000 INJECTION INTRAVENOUS; SUBCUTANEOUS at 23:11

## 2017-11-08 RX ADMIN — Medication 81 MILLIGRAM(S): at 11:20

## 2017-11-08 RX ADMIN — Medication 3 UNIT(S): at 18:02

## 2017-11-08 RX ADMIN — HEPARIN SODIUM 5000 UNIT(S): 5000 INJECTION INTRAVENOUS; SUBCUTANEOUS at 13:42

## 2017-11-08 RX ADMIN — Medication 100 MILLIGRAM(S): at 06:14

## 2017-11-08 NOTE — PROGRESS NOTE ADULT - SUBJECTIVE AND OBJECTIVE BOX
Contact info:  Joselin Dawn MD  Internal Medicine, PGY1  Pager: [phone] (NS)/[phone] (FREDERIC)    M-F 7AM-7PM: pager covered by primary day team  Academic conferences 8AM-9AM and 12PM-1PM: please page only if urgent or consultant  Mon-Sun 7PM-7AM: Night float page [phone] for teams 1-3, [phone] for teams 4, CMA, CMB  Sa-Sun 7AM-12PM: please see contact sheet in front of chart, primary day team  Sa-Sun 12PM-7PM: Page [phone] for teams 1-4 (NS), primary team for CMA/CMB, FREDERIC please page covering resident    24 HOUR EVENTS/ROS:    MEDICATIONS:  aspirin enteric coated 81 milliGRAM(s) Oral daily  enalapril 10 milliGRAM(s) Oral two times a day  heparin  Injectable 5000 Unit(s) SubCutaneous every 8 hours      ALBUTerol    90 MICROgram(s) HFA Inhaler 2 Puff(s) Inhalation every 6 hours PRN  buDESOnide 160 MICROgram(s)/formoterol 4.5 MICROgram(s) Inhaler 2 Puff(s) Inhalation two times a day  montelukast 10 milliGRAM(s) Oral daily  tiotropium 18 MICROgram(s) Capsule 1 Capsule(s) Inhalation daily      docusate sodium 100 milliGRAM(s) Oral three times a day  senna 2 Tablet(s) Oral at bedtime    atorvastatin 20 milliGRAM(s) Oral at bedtime  dextrose 50% Injectable 12.5 Gram(s) IV Push once  dextrose 50% Injectable 25 Gram(s) IV Push once  dextrose 50% Injectable 25 Gram(s) IV Push once  dextrose Gel 1 Dose(s) Oral once PRN  glucagon  Injectable 1 milliGRAM(s) IntraMuscular once PRN  insulin glargine Injectable (LANTUS) 10 Unit(s) SubCutaneous at bedtime  insulin lispro (HumaLOG) corrective regimen sliding scale   SubCutaneous three times a day before meals  insulin lispro (HumaLOG) corrective regimen sliding scale   SubCutaneous at bedtime  insulin lispro Injectable (HumaLOG) 3 Unit(s) SubCutaneous three times a day before meals  predniSONE   Tablet 40 milliGRAM(s) Oral daily    dextrose 5%. 1000 milliLiter(s) IV Continuous <Continuous>      PHYSICAL EXAM:  T(C): 36.8 (11-08-17 @ 04:17), Max: 36.9 (11-07-17 @ 21:25)  HR: 71 (11-08-17 @ 06:57) (71 - 95)  BP: 112/54 (11-08-17 @ 04:17) (112/54 - 151/80)  RR: 18 (11-08-17 @ 04:17) (16 - 18)  SpO2: 97% (11-08-17 @ 06:57) (94% - 98%)  Wt(kg): --  Daily     Daily   I&O's Summary    07 Nov 2017 07:01  -  08 Nov 2017 07:00  --------------------------------------------------------  IN: 780 mL / OUT: 0 mL / NET: 780 mL      TELEMETRY:     Appearance: NAD	  HEENT:   Normal oral mucosa, PERRL, EOMI	  Lymphatic: No lymphadenopathy  Cardiovascular: Normal S1 S2, No JVD, No murmurs, No edema  Respiratory: Lungs clear to auscultation	  Neuro/psych: Grossly non-focal, CN 2-12 intact, AAOX3, mood and affect normal  Gastrointestinal:  Soft, Non-tender, + BS	  Skin: No rashes, No ecchymoses, No cyanosis	  Extremities: Normal range of motion, No clubbing, cyanosis or edema  Vascular: Peripheral pulses palpable 2+ bilaterally    LABS:	 	                        11.9   8.8   )-----------( 128      ( 06 Nov 2017 18:29 )             35.7     11-06    141  |  102  |  21  ----------------------------<  184<H>  4.4   |  29  |  1.01    Ca    9.6      06 Nov 2017 17:32  Phos  2.4     11-06  Mg     1.8     11-06    TPro  7.2  /  Alb  4.0  /  TBili  0.4  /  DBili  x   /  AST  17  /  ALT  22  /  AlkPhos  127<H>  11-06    proBNP: Serum Pro-Brain Natriuretic Peptide: 163 pg/mL (11-06 @ 17:32)    Lipid Profile:   HgA1c:   TSH:   FS: CAPILLARY BLOOD GLUCOSE      POCT Blood Glucose.: 172 mg/dL (08 Nov 2017 08:49)  POCT Blood Glucose.: 267 mg/dL (07 Nov 2017 22:11)  POCT Blood Glucose.: 325 mg/dL (07 Nov 2017 17:12)  POCT Blood Glucose.: 223 mg/dL (07 Nov 2017 12:33)    BCX/UCX:   Coags:     CARDIAC MARKERS:            	    ECG:  	  RADIOLOGY:    Consult notes reviewed:  Care discussed with providers: Contact info:  Joselin Dawn MD  Internal Medicine, PGY1  Pager: [phone] (NS)/[phone] (LIFINA)    M-F 7AM-7PM: pager covered by primary day team  Academic conferences 8AM-9AM and 12PM-1PM: please page only if urgent or consultant  Mon-Sun 7PM-7AM: Night float page [phone] for teams 1-3, [phone] for teams 4, CMA, CMB  Sa-Sun 7AM-12PM: please see contact sheet in front of chart, primary day team  Sa-Sun 12PM-7PM: Page [phone] for teams 1-4 (NS), primary team for CMA/CMB, FREDERIC please page covering resident    24 HOUR EVENTS/ROS: No acute overnight events. Pt reports breathing improved, mild SOB is at baseline. Denies headaches, F/C, dizziness, syncope, CP, N/V, abdominal pain, dysuria, changes in BM. Denies muscle cramps, tolerating PO.    MEDICATIONS:  aspirin enteric coated 81 milliGRAM(s) Oral daily  enalapril 10 milliGRAM(s) Oral two times a day  heparin  Injectable 5000 Unit(s) SubCutaneous every 8 hours  ALBUTerol    90 MICROgram(s) HFA Inhaler 2 Puff(s) Inhalation every 6 hours PRN  buDESOnide 160 MICROgram(s)/formoterol 4.5 MICROgram(s) Inhaler 2 Puff(s) Inhalation two times a day  montelukast 10 milliGRAM(s) Oral daily  tiotropium 18 MICROgram(s) Capsule 1 Capsule(s) Inhalation daily  docusate sodium 100 milliGRAM(s) Oral three times a day  senna 2 Tablet(s) Oral at bedtime  atorvastatin 20 milliGRAM(s) Oral at bedtime  dextrose 50% Injectable 12.5 Gram(s) IV Push once  dextrose 50% Injectable 25 Gram(s) IV Push once  dextrose 50% Injectable 25 Gram(s) IV Push once  dextrose Gel 1 Dose(s) Oral once PRN  glucagon  Injectable 1 milliGRAM(s) IntraMuscular once PRN  insulin glargine Injectable (LANTUS) 10 Unit(s) SubCutaneous at bedtime  insulin lispro (HumaLOG) corrective regimen sliding scale   SubCutaneous three times a day before meals  insulin lispro (HumaLOG) corrective regimen sliding scale   SubCutaneous at bedtime  insulin lispro Injectable (HumaLOG) 3 Unit(s) SubCutaneous three times a day before meals  predniSONE   Tablet 40 milliGRAM(s) Oral daily  dextrose 5%. 1000 milliLiter(s) IV Continuous <Continuous>    PHYSICAL EXAM:  T(C): 36.8 (11-08-17 @ 04:17), Max: 36.9 (11-07-17 @ 21:25)  HR: 71 (11-08-17 @ 06:57) (71 - 95)  BP: 112/54 (11-08-17 @ 04:17) (112/54 - 151/80)  RR: 18 (11-08-17 @ 04:17) (16 - 18)  SpO2: 97% (11-08-17 @ 06:57) (94% - 98%)  Wt(kg): --  Daily     Daily   I&O's Summary    07 Nov 2017 07:01  -  08 Nov 2017 07:00  --------------------------------------------------------  IN: 780 mL / OUT: 0 mL / NET: 780 mL    Appearance: elderly black female in NAD, sitting on edge of bed	  HEENT:   Normal oral mucosa, PERRL, EOMI, mild scarring noted at trach site, +raspy voice	  Lymphatic: No lymphadenopathy  Cardiovascular: Normal S1 S2, No JVD, No murmurs, No edema  Respiratory: no wheezes, rhonchi, or rales, no accessory muscle use, good air movement  Psychiatry: A & O x 3, Mood & affect appropriate  Gastrointestinal:  Soft, Non-tender, + BS	  Skin: No rashes, No ecchymoses, No cyanosis	  Neurologic: Non-focal  MSK/Extremities: Normal range of motion, No clubbing, cyanosis, 1+ pitting edema to calves B/L  Vascular: Peripheral pulses palpable 2+ bilaterally      LABS:	 	                        11.9   8.8   )-----------( 128      ( 06 Nov 2017 18:29 )             35.7     11-06    141  |  102  |  21  ----------------------------<  184<H>  4.4   |  29  |  1.01    Ca    9.6      06 Nov 2017 17:32  Phos  2.4     11-06  Mg     1.8     11-06    TPro  7.2  /  Alb  4.0  /  TBili  0.4  /  DBili  x   /  AST  17  /  ALT  22  /  AlkPhos  127<H>  11-06    proBNP: Serum Pro-Brain Natriuretic Peptide: 163 pg/mL (11-06 @ 17:32)    Immunoglobulin E Level, Serum Quant (11.07.17 @ 12:31)    Immunoglobulin E Level, Serum Quant: 87 IU/mL     Hemoglobin A1C, Whole Blood (09.13.17 @ 08:09)    Hemoglobin A1C, Whole Blood: 7.9: Method: Immunoassay       Reference Range                4.0-5.6%       High risk (prediabetic)        5.7-6.4%       Diabetic, diagnostic             >=6.5%       ADA diabetic treatment goal       <7.0%  The Hemoglobin A1c testing is NGSP-certified.Reference ranges are based  upon the 2010 recommendations of  the American Diabetes Association.  Interpretation may vary for children  and adolescents. %    FS: CAPILLARY BLOOD GLUCOSE    POCT Blood Glucose.: 172 mg/dL (08 Nov 2017 08:49)  POCT Blood Glucose.: 267 mg/dL (07 Nov 2017 22:11)  POCT Blood Glucose.: 325 mg/dL (07 Nov 2017 17:12)  POCT Blood Glucose.: 223 mg/dL (07 Nov 2017 12:33    Consult notes reviewed:    Care discussed with providers:

## 2017-11-08 NOTE — PROGRESS NOTE ADULT - ATTENDING COMMENTS
Asthma exacerbation- improving, has good air entry now.   No infection-- CXR clear (I reviewed films)  -- peak flow low, baseline inknown, continue to trend  --Resp therapy for MDI and asthma education  --Cont B-agonist and ipratropium nebs, cont prednisone- no taper yet.  Restart long acting inhaler  meds.  Add tiotropium to reduce exacerbation risk    Is c/o muscle cramps, none since yesterday.  Likely hypokalemia, now replete--continue to monitor electrolytes    Chronic HFpEF, well compensated now.  No clear need for lasix right now, cont ARB Asthma exacerbation- improving, has good air entry now.   No infection-- CXR clear (I reviewed films)  -- peak flow low, baseline inknown, continue to trend  --Resp therapy for MDI and asthma education  --Cont B-agonist and ipratropium nebs, cont prednisone- no taper yet.  Restart long acting inhaler  meds.  Add tiotropium to reduce exacerbation risk    Is c/o muscle cramps, none since yesterday.  Likely hypokalemia, now replete--continue to monitor electrolytes    Chronic HFpEF, well compensated now.  No clear need for lasix right now, cont ARB    [facility]

## 2017-11-09 ENCOUNTER — TRANSCRIPTION ENCOUNTER (OUTPATIENT)
Age: 72
End: 2017-11-09

## 2017-11-09 VITALS
OXYGEN SATURATION: 100 % | RESPIRATION RATE: 18 BRPM | TEMPERATURE: 98 F | HEART RATE: 87 BPM | DIASTOLIC BLOOD PRESSURE: 79 MMHG | SYSTOLIC BLOOD PRESSURE: 159 MMHG

## 2017-11-09 PROCEDURE — 99239 HOSP IP/OBS DSCHRG MGMT >30: CPT

## 2017-11-09 RX ORDER — ACETAMINOPHEN 500 MG
650 TABLET ORAL EVERY 6 HOURS
Qty: 0 | Refills: 0 | Status: DISCONTINUED | OUTPATIENT
Start: 2017-11-09 | End: 2017-11-09

## 2017-11-09 RX ORDER — BENZOCAINE AND MENTHOL 5; 1 G/100ML; G/100ML
1 LIQUID ORAL EVERY 6 HOURS
Qty: 0 | Refills: 0 | Status: DISCONTINUED | OUTPATIENT
Start: 2017-11-09 | End: 2017-11-09

## 2017-11-09 RX ORDER — ACETAMINOPHEN 500 MG
650 TABLET ORAL ONCE
Qty: 0 | Refills: 0 | Status: COMPLETED | OUTPATIENT
Start: 2017-11-09 | End: 2017-11-09

## 2017-11-09 RX ADMIN — Medication 3 UNIT(S): at 13:48

## 2017-11-09 RX ADMIN — BUDESONIDE AND FORMOTEROL FUMARATE DIHYDRATE 2 PUFF(S): 160; 4.5 AEROSOL RESPIRATORY (INHALATION) at 06:02

## 2017-11-09 RX ADMIN — Medication 650 MILLIGRAM(S): at 01:57

## 2017-11-09 RX ADMIN — ALBUTEROL 2 PUFF(S): 90 AEROSOL, METERED ORAL at 12:46

## 2017-11-09 RX ADMIN — Medication 40 MILLIGRAM(S): at 05:16

## 2017-11-09 RX ADMIN — Medication 3 UNIT(S): at 08:48

## 2017-11-09 RX ADMIN — MONTELUKAST 10 MILLIGRAM(S): 4 TABLET, CHEWABLE ORAL at 11:21

## 2017-11-09 RX ADMIN — Medication 2: at 08:48

## 2017-11-09 RX ADMIN — Medication 81 MILLIGRAM(S): at 11:21

## 2017-11-09 RX ADMIN — HEPARIN SODIUM 5000 UNIT(S): 5000 INJECTION INTRAVENOUS; SUBCUTANEOUS at 05:16

## 2017-11-09 RX ADMIN — Medication 10 MILLIGRAM(S): at 05:16

## 2017-11-09 RX ADMIN — TIOTROPIUM BROMIDE 1 CAPSULE(S): 18 CAPSULE ORAL; RESPIRATORY (INHALATION) at 12:27

## 2017-11-09 RX ADMIN — Medication 100 MILLIGRAM(S): at 01:20

## 2017-11-09 RX ADMIN — ALBUTEROL 2 PUFF(S): 90 AEROSOL, METERED ORAL at 06:05

## 2017-11-09 RX ADMIN — Medication 6: at 13:48

## 2017-11-09 NOTE — PROGRESS NOTE ADULT - PROBLEM SELECTOR PLAN 2
S/P AICD, EF 35% in 2016 but pharm stress test around the same to EF 50s.  -f/u  TTE  - Assess volume status daily, consider lasix if pedal edema worsening  - Continue enalapril 10 mg q12h  - Not on BB due to severe asthma  - Continue ASA

## 2017-11-09 NOTE — PROGRESS NOTE ADULT - PROBLEM SELECTOR PLAN 8
-[facility]  -Dispo: likely DC home today
-[facility]
-[facility]  -Dispo: likely DC home tomorrow

## 2017-11-09 NOTE — DISCHARGE NOTE ADULT - HOSPITAL COURSE
Patient is a 71 yo female with PMH of asthma c/b numerous hospital admissions (respiratory failure with intubation and tracheostomy in 2016), CHF with reduced ejected fraction s/p AICD in 2008, HTN, HLD, DMII, diverticulosis, and gout, p/w acute worsening of baseline dyspnea and productive cough. On admission, she was afebrile and hemodynamically stable, had b/l expiratory wheezes, a normal WBC, unremarkable CXR, and negative RVP. She was treated for an asthma exacerbation with 4 days of prednisone 40 mg (to be tapered by 10 mg every two days) and home asthma medications. Of note, her home insulin regimen was increased during admission due to elevated FSBG, which improved, and was she tested for allergic asthma with IgE levels, which were within the normal range. She was d/c in stable medical condition to go home and f/u with outpatient pulmonologist in several days. Patient is a 71 yo female with PMH of asthma c/b numerous hospital admissions (respiratory failure with intubation and tracheostomy in 2016), CHF with reduced ejected fraction s/p AICD in 2008, HTN, HLD, DM2, diverticulosis, and gout, p/w acute worsening of baseline dyspnea and productive cough. On admission, she was afebrile and hemodynamically stable, had b/l expiratory wheezes, a normal WBC, unremarkable CXR, and negative RVP (see results below). She was treated for an asthma exacerbation with 4 days of prednisone 40 mg (to be tapered by 10 mg every two days) and home asthma medications (see above), with the addition of tiotropium. Of note, her insulin regimen was adjusted during admission due to elevated FSBG likely 2/2 steroid use, which improved. Pt was also discharged on metformin. During her stay, she tested for allergic asthma with IgE levels, which were within the normal range. By the end of her hospital stay, pt was comfortable, in no acute respiratory distress, with her presenting symptoms resolved. She was discharged in stable medical condition to go home and f/u with outpatient pulmonologist in several days. Patient is a 71 yo female with PMH of asthma c/b numerous hospital admissions (respiratory failure with intubation and tracheostomy in 2016), CHF with reduced ejected fraction s/p AICD in 2008, HTN, HLD, DM2, diverticulosis, and gout, p/w acute worsening of baseline dyspnea and productive cough. On admission, she was afebrile and hemodynamically stable, had b/l expiratory wheezes, a normal WBC, unremarkable CXR, and negative RVP (see results below). She was treated for an asthma exacerbation with 4 days of prednisone 40 mg (to be tapered by 10 mg every two days) and home asthma medications (see above), with the addition of tiotropium. Of note, her insulin regimen was adjusted during admission due to elevated FSBG likely 2/2 steroid use, which improved. Pt was also discharged on metformin. During her stay, she tested for allergic asthma with IgE levels, which were within the normal range. By the end of her hospital stay, pt was comfortable, in no acute respiratory distress, with her presenting symptoms resolved. Her pulmonologist was contacted and hospital course and treatment was discussed with him. She was discharged in stable medical condition to go home and f/u with outpatient pulmonologist in several days.

## 2017-11-09 NOTE — DISCHARGE NOTE ADULT - CARE PLAN
Principal Discharge DX:	Asthma exacerbation  Goal:	Follow up care  Instructions for follow-up, activity and diet:	Your asthma was well-controlled for most of your hospital stay on montelukast, albuterol, tiotropium, and prednisone. Please take the prednisone taper AS DESCRIBED ABOVE (reduce the amount you take by 10mg every 2 days). Please continue to take your inhalers as prescribed, and follow up with your pulmonary doctor within two weeks. If you experience dizziness, lightheadedness, acute shortness of breath, or change in skin color to blue, please seek emergency medical attention immediately.  Secondary Diagnosis:	Congestive heart failure with cardiomyopathy  Goal:	Monitor for signs/symptoms  Instructions for follow-up, activity and diet:	Please follow up with your cardiologist to monitor your CHF and for a possible echocardiogram (to evaluate your heart). Continue taking your statin, aspirin, enalapril, and lasix. If you feel palpitations, chest pain, or difficulty breathing, please seek medical attention immediately.  Secondary Diagnosis:	Diabetes mellitus  Goal:	Maintain current regimen  Instructions for follow-up, activity and diet:	Please check your blood sugar regularly, and take diabetes medications as prescribed. Note that we have ADDED metformin - a diabetes medication to your regimen. If your blood sugar is consistently high (>180), or you feel extreme dizziness, thirstiness, or frequent urination, please seek emergency medical attention immediately.  Secondary Diagnosis:	CKD (chronic kidney disease) stage 2, GFR 60-89 ml/min  Secondary Diagnosis:	Diverticulitis  Secondary Diagnosis:	Gout  Secondary Diagnosis:	Hypertension Principal Discharge DX:	Asthma exacerbation  Goal:	Follow up care  Instructions for follow-up, activity and diet:	Your asthma was well-controlled for most of your hospital stay on montelukast, albuterol, tiotropium, and prednisone. Please take the prednisone taper AS DESCRIBED ABOVE (reduce the amount you take by 10mg every 2 days). Please continue to take your inhalers as prescribed, and follow up with your pulmonary doctor within two weeks. If you experience dizziness, lightheadedness, acute shortness of breath, or change in skin color to blue, please seek emergency medical attention immediately.  Secondary Diagnosis:	Congestive heart failure with cardiomyopathy  Goal:	Monitor for signs/symptoms  Instructions for follow-up, activity and diet:	Please follow up with your cardiologist to monitor your CHF and for a possible echocardiogram (to evaluate your heart). Continue taking your statin, aspirin, enalapril, and lasix. If you feel palpitations, chest pain, or difficulty breathing, please seek medical attention immediately.  Secondary Diagnosis:	Diabetes mellitus  Goal:	Maintain current regimen  Instructions for follow-up, activity and diet:	Please check your blood sugar regularly, and take diabetes medications as prescribed. Note that we have ADDED metformin - a diabetes medication to your regimen. If your blood sugar is consistently high (>180), or you feel extreme dizziness, thirstiness, or frequent urination, please seek emergency medical attention immediately.  Secondary Diagnosis:	CKD (chronic kidney disease) stage 2, GFR 60-89 ml/min  Goal:	Follow up care  Instructions for follow-up, activity and diet:	Your CKD was well-controlled during your hospital stay. Please continue your current regimen and follow up with your PMD within two weeks.  Secondary Diagnosis:	Diverticulitis  Goal:	Monitor for signs/symptoms  Instructions for follow-up, activity and diet:	Your diverticulitis was well-controlled during your hospital stay. Please continue your current regimen and follow up with your PMD within two weeks.  Secondary Diagnosis:	Gout  Goal:	Maintain current regimen  Instructions for follow-up, activity and diet:	Your gout was well-controlled during your hospital stay. Please continue your current regimen and follow up with your PMD within two weeks.  Secondary Diagnosis:	Hypertension  Goal:	Maintain current regimen  Instructions for follow-up, activity and diet:	Please check your blood pressure regularly and take your blood pressure medications as prescribed. If it is consistently high (SBP >180 or DBP >90), or you experience intractable headaches, dizziness, changes in vision, chest pain, difficulty breathing, or movement difficulty, please seek emergency medical attention immediately. Principal Discharge DX:	Asthma exacerbation  Goal:	Follow up care  Instructions for follow-up, activity and diet:	Your asthma was well-controlled for most of your hospital stay on montelukast, albuterol, tiotropium, and prednisone. Please take the prednisone taper AS DESCRIBED BELOW (reduce the amount you take by 10mg every 2 days). Please continue to take your inhalers as prescribed, and follow up with your pulmonary doctor within two weeks. If you experience dizziness, lightheadedness, acute shortness of breath, or change in skin color to blue, please seek emergency medical attention immediately.  Secondary Diagnosis:	Congestive heart failure with cardiomyopathy  Goal:	Monitor for signs/symptoms  Instructions for follow-up, activity and diet:	Please follow up with your cardiologist to monitor your CHF and for a possible echocardiogram (to evaluate your heart). Continue taking your statin, aspirin, enalapril, and lasix. If you feel palpitations, chest pain, or difficulty breathing, please seek medical attention immediately.  Secondary Diagnosis:	Diabetes mellitus  Goal:	Maintain current regimen  Instructions for follow-up, activity and diet:	Please check your blood sugar regularly, and take diabetes medications as prescribed. Note that we have ADDED metformin - a diabetes medication to your regimen. If your blood sugar is consistently high (>180), or you feel extreme dizziness, thirstiness, or frequent urination, please seek emergency medical attention immediately.  Secondary Diagnosis:	CKD (chronic kidney disease) stage 2, GFR 60-89 ml/min  Goal:	Follow up care  Instructions for follow-up, activity and diet:	Your CKD was well-controlled during your hospital stay. Please continue your current regimen and follow up with your PMD within two weeks.  Secondary Diagnosis:	Diverticulitis  Goal:	Monitor for signs/symptoms  Instructions for follow-up, activity and diet:	Your diverticulitis was well-controlled during your hospital stay. Please continue your current regimen and follow up with your PMD within two weeks.  Secondary Diagnosis:	Gout  Goal:	Maintain current regimen  Instructions for follow-up, activity and diet:	Your gout was well-controlled during your hospital stay. Please continue your current regimen and follow up with your PMD within two weeks.  Secondary Diagnosis:	Hypertension  Goal:	Maintain current regimen  Instructions for follow-up, activity and diet:	Please check your blood pressure regularly and take your blood pressure medications as prescribed. If it is consistently high (SBP >180 or DBP >90), or you experience intractable headaches, dizziness, changes in vision, chest pain, difficulty breathing, or movement difficulty, please seek emergency medical attention immediately. Principal Discharge DX:	Asthma exacerbation  Goal:	Follow up care  Assessment and plan of treatment:	Your asthma was well-controlled for most of your hospital stay on montelukast, albuterol, tiotropium, and prednisone. Please take the prednisone taper AS DESCRIBED BELOW (reduce the amount you take by 10mg every 2 days). Please continue to take your inhalers as prescribed, and follow up with your pulmonary doctor within two weeks. If you experience dizziness, lightheadedness, acute shortness of breath, or change in skin color to blue, please seek emergency medical attention immediately.  Secondary Diagnosis:	Congestive heart failure with cardiomyopathy  Goal:	Monitor for signs/symptoms  Assessment and plan of treatment:	Please follow up with your cardiologist to monitor your CHF and for a possible echocardiogram (to evaluate your heart). Continue taking your statin, aspirin, enalapril, and lasix. If you feel palpitations, chest pain, or difficulty breathing, please seek medical attention immediately.  Secondary Diagnosis:	Diabetes mellitus  Goal:	Maintain current regimen  Assessment and plan of treatment:	Please check your blood sugar regularly, and take diabetes medications as prescribed. Note that we have ADDED metformin - a diabetes medication to your regimen. If your blood sugar is consistently high (>180), or you feel extreme dizziness, thirstiness, or frequent urination, please seek emergency medical attention immediately.  Secondary Diagnosis:	CKD (chronic kidney disease) stage 2, GFR 60-89 ml/min  Goal:	Follow up care  Assessment and plan of treatment:	Your CKD was well-controlled during your hospital stay. Please continue your current regimen and follow up with your PMD within two weeks.  Secondary Diagnosis:	Diverticulitis  Goal:	Monitor for signs/symptoms  Assessment and plan of treatment:	Your diverticulitis was well-controlled during your hospital stay. Please continue your current regimen and follow up with your PMD within two weeks.  Secondary Diagnosis:	Gout  Goal:	Maintain current regimen  Assessment and plan of treatment:	Your gout was well-controlled during your hospital stay. Please continue your current regimen and follow up with your PMD within two weeks.  Secondary Diagnosis:	Hypertension  Goal:	Maintain current regimen  Assessment and plan of treatment:	Please check your blood pressure regularly and take your blood pressure medications as prescribed. If it is consistently high (SBP >180 or DBP >90), or you experience intractable headaches, dizziness, changes in vision, chest pain, difficulty breathing, or movement difficulty, please seek emergency medical attention immediately.

## 2017-11-09 NOTE — PROGRESS NOTE ADULT - ATTENDING COMMENTS
Asthma exacerbation- improving, has good air entry now.   No infection-- CXR clear (I reviewed films)  begin steroid taper, decrease prednisone to 30mg.  Plan to taper by 10mg every other or every 3rd dayn  --Cont B-agonist and ipratropium nebs, cont prednisone- no taper yet.  Restart long acting inhaler  meds.  Add tiotropium to reduce exacerbation risk    No further muscle cramps,     Chronic HFpEF, well compensated now.  No clear need for lasix right now, cont ARB    Discharge home today  F/U w pulmonologist Dr Daily.  Attempting to reach Dr Daily to handoff care    Discharge time 40 minutes    [facility]

## 2017-11-09 NOTE — DISCHARGE NOTE ADULT - CARE PROVIDERS DIRECT ADDRESSES
[email or web address]

## 2017-11-09 NOTE — PROGRESS NOTE ADULT - PROBLEM SELECTOR PLAN 1
Dyspnea and productive cough likely asthma exacerbation in setting of expiratory wheezes and improvement with prednisone and albuterol. HF symptoms of orthopnea and pedal edema at baseline/improved from baseline, no chest pain. CXR normal, no evidence of pneumonia on CXR, no SIRS, RVP-.   - Continue standing duonebs  - Prednisone 40 mg daily, received in ED x1  - Continue home symbicort, albuterol inhaler at bedside, singulair  -will obtain IgE to determine whether or not allergic asthma  -medication administration teaching by RT  -peak flow meter at bedside to assess daily
Dyspnea and productive cough likely asthma exacerbation in setting of expiratory wheezes and improvement with prednisone and albuterol. Trigger unclear. CXR normal, no evidence of pneumonia on CXR, no SIRS, RVP-.   -Given evidence for use of tiotropium in asthma pts, will add tiotropium and DC duonebs  - Prednisone 40 mg daily  - Continue home symbicort, albuterol inhaler at bedside, singulair  -IgE WNL - unlikely allergic asthma  -medication administration teaching by RT  -peak flow meter at bedside to assess daily - 190 (below optimal)
Dyspnea and productive cough likely asthma exacerbation in setting of expiratory wheezes and improvement with prednisone and albuterol. Trigger unclear. CXR normal, no evidence of pneumonia on CXR, no SIRS, RVP-.   -Given evidence for use of tiotropium in asthma pts, will add tiotropium and DC duonebs  - Prednisone 40 mg daily  - Continue home symbicort, albuterol inhaler at bedside, singulair  -IgE WNL - unlikely allergic asthma  -medication administration teaching by RT  -peak flow meter at bedside to assess daily   -added tiotropium yesterday  -will taper prednisone on discharge today

## 2017-11-09 NOTE — PROGRESS NOTE ADULT - SUBJECTIVE AND OBJECTIVE BOX
Contact info:  Joselin Dawn MD  Internal Medicine, PGY1  Pager: [phone] (NS)/[phone] (FREDERIC)    M-F 7AM-7PM: pager covered by primary day team  Academic conferences 8AM-9AM and 12PM-1PM: please page only if urgent or consultant  Mon-Sun 7PM-7AM: Night float page [phone] for teams 1-3, [phone] for teams 4, CMA, CMB  Sa-Sun 7AM-12PM: please see contact sheet in front of chart, primary day team  Sa-Sun 12PM-7PM: Page [phone] for teams 1-4 (NS), primary team for CMA/CMB, FREDERIC please page covering resident    24 HOUR EVENTS/ROS:    MEDICATIONS:  aspirin enteric coated 81 milliGRAM(s) Oral daily  enalapril 10 milliGRAM(s) Oral two times a day  heparin  Injectable 5000 Unit(s) SubCutaneous every 8 hours      ALBUTerol    90 MICROgram(s) HFA Inhaler 2 Puff(s) Inhalation every 6 hours PRN  buDESOnide 160 MICROgram(s)/formoterol 4.5 MICROgram(s) Inhaler 2 Puff(s) Inhalation two times a day  guaiFENesin   Syrup  (Sugar-Free) 100 milliGRAM(s) Oral every 6 hours PRN  montelukast 10 milliGRAM(s) Oral daily  tiotropium 18 MICROgram(s) Capsule 1 Capsule(s) Inhalation daily      docusate sodium 100 milliGRAM(s) Oral three times a day  senna 2 Tablet(s) Oral at bedtime    atorvastatin 20 milliGRAM(s) Oral at bedtime  dextrose 50% Injectable 12.5 Gram(s) IV Push once  dextrose 50% Injectable 25 Gram(s) IV Push once  dextrose 50% Injectable 25 Gram(s) IV Push once  dextrose Gel 1 Dose(s) Oral once PRN  glucagon  Injectable 1 milliGRAM(s) IntraMuscular once PRN  insulin glargine Injectable (LANTUS) 10 Unit(s) SubCutaneous at bedtime  insulin lispro (HumaLOG) corrective regimen sliding scale   SubCutaneous three times a day before meals  insulin lispro (HumaLOG) corrective regimen sliding scale   SubCutaneous at bedtime  insulin lispro Injectable (HumaLOG) 3 Unit(s) SubCutaneous three times a day before meals  predniSONE   Tablet 40 milliGRAM(s) Oral daily    dextrose 5%. 1000 milliLiter(s) IV Continuous <Continuous>      PHYSICAL EXAM:  T(C): 37 (17 @ 04:26), Max: 37 (17 @ 04:26)  HR: 71 (17 @ 06:02) (71 - 96)  BP: 102/62 (17 @ 04:26) (102/62 - 160/94)  RR: 18 (17 @ 04:26) (16 - 20)  SpO2: 100% (17 @ 04:26) (97% - 100%)  Wt(kg): --  Daily     Daily Weight in k.4 (2017 09:00)  I&O's Summary    2017 07:01  -  2017 07:00  --------------------------------------------------------  IN: 840 mL / OUT: 0 mL / NET: 840 mL      TELEMETRY:     Appearance: NAD	  HEENT:   Normal oral mucosa, PERRL, EOMI	  Lymphatic: No lymphadenopathy  Cardiovascular: Normal S1 S2, No JVD, No murmurs, No edema  Respiratory: Lungs clear to auscultation	  Neuro/psych: Grossly non-focal, CN 2-12 intact, AAOX3, mood and affect normal  Gastrointestinal:  Soft, Non-tender, + BS	  Skin: No rashes, No ecchymoses, No cyanosis	  Extremities: Normal range of motion, No clubbing, cyanosis or edema  Vascular: Peripheral pulses palpable 2+ bilaterally    LABS:	 	          proBNP: Serum Pro-Brain Natriuretic Peptide: 163 pg/mL ( @ 17:32)    Lipid Profile:   HgA1c:   TSH:   FS: CAPILLARY BLOOD GLUCOSE      POCT Blood Glucose.: 141 mg/dL (2017 22:42)  POCT Blood Glucose.: 153 mg/dL (2017 17:52)  POCT Blood Glucose.: 306 mg/dL (2017 12:54)  POCT Blood Glucose.: 172 mg/dL (2017 08:49)    BCX/UCX:   Coags:     CARDIAC MARKERS:            	    ECG:  	  RADIOLOGY:    Consult notes reviewed:  Care discussed with providers: Contact info:  Joselin Dawn MD  Internal Medicine, PGY1  Pager: [phone] (NS)/[phone] (FREDERIC)    M-F 7AM-7PM: pager covered by primary day team  Academic conferences 8AM-9AM and 12PM-1PM: please page only if urgent or consultant  Mon-Sun 7PM-7AM: Night float page [phone] for teams 1-3, [phone] for teams 4, CMA, CMB  Sa-Sun 7AM-12PM: please see contact sheet in front of chart, primary day team  Sa-Sun 12PM-7PM: Page [phone] for teams 1-4 (NS), primary team for CMA/WENDI, FREDERIC please page covering resident    24 HOUR EVENTS/ROS: No acute overnight events. Pt complaining of non-productive cough that makes her "head hurt" - mostly in the frontal area. Otherwise denies F/C, dizziness, syncope, CP, N/V, abdominal pain, dysuria. Had 2 BM yesterday. SOB well-controlled.    MEDICATIONS:  aspirin enteric coated 81 milliGRAM(s) Oral daily  enalapril 10 milliGRAM(s) Oral two times a day  heparin  Injectable 5000 Unit(s) SubCutaneous every 8 hours  ALBUTerol    90 MICROgram(s) HFA Inhaler 2 Puff(s) Inhalation every 6 hours PRN  buDESOnide 160 MICROgram(s)/formoterol 4.5 MICROgram(s) Inhaler 2 Puff(s) Inhalation two times a day  guaiFENesin   Syrup  (Sugar-Free) 100 milliGRAM(s) Oral every 6 hours PRN  montelukast 10 milliGRAM(s) Oral daily  tiotropium 18 MICROgram(s) Capsule 1 Capsule(s) Inhalation daily  docusate sodium 100 milliGRAM(s) Oral three times a day  senna 2 Tablet(s) Oral at bedtime  atorvastatin 20 milliGRAM(s) Oral at bedtime  dextrose 50% Injectable 12.5 Gram(s) IV Push once  dextrose 50% Injectable 25 Gram(s) IV Push once  dextrose 50% Injectable 25 Gram(s) IV Push once  dextrose Gel 1 Dose(s) Oral once PRN  glucagon  Injectable 1 milliGRAM(s) IntraMuscular once PRN  insulin glargine Injectable (LANTUS) 10 Unit(s) SubCutaneous at bedtime  insulin lispro (HumaLOG) corrective regimen sliding scale   SubCutaneous three times a day before meals  insulin lispro (HumaLOG) corrective regimen sliding scale   SubCutaneous at bedtime  insulin lispro Injectable (HumaLOG) 3 Unit(s) SubCutaneous three times a day before meals  predniSONE   Tablet 40 milliGRAM(s) Oral daily  dextrose 5%. 1000 milliLiter(s) IV Continuous <Continuous>    PHYSICAL EXAM:  T(C): 37 (17 @ 04:26), Max: 37 (17 @ 04:26)  HR: 71 (17 @ 06:02) (71 - 96)  BP: 102/62 (17 @ 04:26) (102/62 - 160/94)  RR: 18 (17 @ 04:26) (16 - 20)  SpO2: 100% (17 @ 04:26) (97% - 100%)  Wt(kg): --  Daily     Daily Weight in k.4 (2017 09:00)  I&O's Summary    2017 07:01  -  2017 07:00  --------------------------------------------------------  IN: 840 mL / OUT: 0 mL / NET: 840 mL    Appearance: elderly black female in NAD, sitting on edge of bed	  HEENT:   Normal oral mucosa, PERRL, EOMI, mild scarring noted at trach site, +raspy voice	  Lymphatic: No lymphadenopathy  Cardiovascular: Normal S1 S2, No JVD, No murmurs, No edema  Respiratory: scattered wheezes, rhonchi, or rales, no accessory muscle use, good air movement  Psychiatry: A & O x 3, Mood & affect appropriate  Gastrointestinal:  Soft, Non-tender, + BS	  Skin: No rashes, No ecchymoses, No cyanosis	  Neurologic: Non-focal  MSK/Extremities: Normal range of motion, No clubbing, cyanosis, 1+ pitting edema to calves B/L  Vascular: Peripheral pulses palpable 2+ bilaterally    POCT Blood Glucose.: 141 mg/dL (2017 22:42)  POCT Blood Glucose.: 153 mg/dL (2017 17:52)  POCT Blood Glucose.: 306 mg/dL (2017 12:54)  POCT Blood Glucose.: 172 mg/dL (2017 08:49)    Consult notes reviewed:  Care discussed with providers:

## 2017-11-09 NOTE — PROGRESS NOTE ADULT - PROBLEM SELECTOR PLAN 4
Continue ISS, monitor FS - better controlled today  -Given A1C 7.9, not optimized  -c/w premeal insulin 3U TID and  lantus 10U  -attempted to reach PMD to inquire not on metformin yesterday (possible risk lactic acidosis?) without success. Will try again today, and if no apparent contraindication, will send out w/metformin prescription
Continue ISS, monitor FS - slightly elevated, may be 2/2 steroid use  -Given A1C 7.9, not optimized  -FS high  -will start premeal insulin 3U TID and increase lantus to 10U
Continue home lantus 8u and ISS, monitor FS - slightly elevated, may be 2/2 steroid use

## 2017-11-09 NOTE — DISCHARGE NOTE ADULT - CARE PROVIDER_API CALL
Kirill Daily), Internal Medicine; Pulmonary Disease  [facility]  [address] [place] [address]  Phone: [phone]  Fax: [phone]    Neena Comer), Internal Medicine  [address] [place] [address]  Phone: [phone]  Fax: [phone]    Balaji Hobson (MD; MPH), Adv [facility]ail [facility] Cardio; Cardiology; Internal Medicine  [address] [place] [address]  Phone: [phone]  Fax: [phone]

## 2017-11-09 NOTE — PROGRESS NOTE ADULT - PROBLEM SELECTOR PLAN 7
-stable, not currently symptomatic

## 2017-11-09 NOTE — DISCHARGE NOTE ADULT - PATIENT PORTAL LINK FT
“You can access the FollowHealth Patient Portal, offered by [facility], by registering with the following website: [email or web address]”

## 2017-11-09 NOTE — DISCHARGE NOTE ADULT - MEDICATION SUMMARY - MEDICATIONS TO TAKE
I will START or STAY ON the medications listed below when I get home from the hospital:    Symbicort 160 mcg-4.5 mcg/inh inhalation aerosol  -- 2 puff(s) by mouth once a day  -- Indication: For Asthma exacerbation    Blue-Emu Super Strength cream  -- Apply on skin to affected area once a day  -- Indication: For Rash    predniSONE 10 mg oral tablet  -- 3 tab(s) by mouth once a day MDD:3 tabs  -- Indication: For Asthma exacerbation    predniSONE 10 mg oral tablet  -- 2 tab(s) by mouth once a day MDD:2 tabs  -- Indication: For Asthma exacerbation    predniSONE 10 mg oral tablet  -- 1 tab(s) by mouth once a day MDD:1 tab  -- Indication: For Asthma exacerbation    acetaminophen 325 mg oral tablet  -- 2 tab(s) by mouth every 6 hours, As needed, Mild Pain (1 - 3)  -- Indication: For Pain    aspirin 81 mg oral delayed release tablet  -- 1 tab(s) by mouth once a day  -- Indication: For Blood thinner    enalapril 10 mg oral tablet  -- 1 tab(s) by mouth 2 times a day  -- Indication: For Hypertension    Lantus 100 units/mL subcutaneous solution  -- 8 unit(s) subcutaneous once a day (at bedtime)  -- Indication: For Diabetes mellitus type 2    metFORMIN 500 mg oral tablet  -- 1 tab(s) by mouth once a day   -- Check with your doctor before becoming pregnant.  Do not drink alcoholic beverages when taking this medication.  It is very important that you take or use this exactly as directed.  Do not skip doses or discontinue unless directed by your doctor.  Obtain medical advice before taking any non-prescription drugs as some may affect the action of this medication.  Take with food or milk.    -- Indication: For Diabetes mellitus type 2    ZyrTEC 10 mg oral tablet  -- 1 tab(s) by mouth once a day  -- Indication: For Nasal congestion    atorvastatin 20 mg oral tablet  -- 1 tab(s) by mouth once a day (at bedtime)  -- Indication: For Hyperlipidemia    Ventolin HFA 90 mcg/inh inhalation aerosol  -- 2 puff(s) inhaled 4 times a day, As Needed  -- Indication: For Asthma exacerbation    Spiriva 18 mcg inhalation capsule  -- 1 cap(s) inhaled once a day   -- Check with your doctor before becoming pregnant.  For inhalation only.  It is very important that you take or use this exactly as directed.  Do not skip doses or discontinue unless directed by your doctor.  Obtain medical advice before taking any non-prescription drugs as some may affect the action of this medication.    -- Indication: For Asthma exacerbation    Salonpas 0.025%-1.25% topical film  -- Apply on skin to affected area , As Needed  -- Indication: For Muscle cramps    furosemide 20 mg oral tablet  -- 1 tab(s) by mouth once a day, As Needed  -- Indication: For Leg swelling    Diabetic Tuss 100 mg/5 mL oral liquid  -- 10 milliliter(s) by mouth once a day  -- Indication: For Cough    Pepcid AC 10 mg oral tablet  -- 1 tab(s) by mouth once a day  -- Indication: For Reflux    montelukast 10 mg oral tablet  -- 1 tab(s) by mouth once a day  -- Indication: For Asthma exacerbation    magnesium oxide 500 mg oral tablet  -- 1 tab(s) by mouth 2 times a day, As Needed  -- Indication: For Nutritional supplement    potassium chloride 20 mEq/15 mL oral liquid  -- 5 milliliter(s) by mouth once a day  -- Indication: For Nutritional supplement    benzocaine-menthol 15 mg-3.6 mg mucous membrane lozenge  -- 1 lozenge mucous membrane 3 times a day, As Needed -for cough   -- Indication: For Cough    TheraTears ophthalmic solution  -- 1 drop(s) in each eye , As Needed  -- Indication: For Dry eyes

## 2017-11-09 NOTE — PROGRESS NOTE ADULT - ASSESSMENT
72F HTN, HLD, T2D, HFrEF s/p AICD (not on beta-blocker due to severe asthma), with asthma (not on home O2, childhood onset, intubated x1 in 2016) presents with dyspnea and productive cough improving with nebs, expiratory wheezes on exam, likely asthma exacerbation.

## 2017-11-09 NOTE — DISCHARGE NOTE ADULT - OTHER SIGNIFICANT FINDINGS
11/6 Chest xray:  FINDINGS:  No focal consolidations.  No pleural effusion or pneumothorax.  Cardiomediastinal silhouette is not accurately evaluated in this   projection.  Left chest wall AICD.  Degenerative changes of thoracic spine.    IMPRESSION:  No focal consolidation.    Rapid Respiratory Viral Panel (11.06.17 @ 18:13)    Rapid RVP Result: NotDetec: The [employer] RVP Rapid uses polymerase chain reaction (PCR) and melt  curve analysis to screen for adenovirus; coronavirus HKU1, NL63, 229E,  OC43; human metapneumovirus (hMPV); human enterovirus/rhinovirus  (Entero/RV); influenza A; influenza A/H1;influenza A/H3; influenza  A/H1-2009; influenza B; parainfluenza viruses 1, 2, 3, 4; respiratory  syncytial virus; Bordetella pertussis; Mycoplasma pneumoniae; and  Chlamydophila pneumoniae.    Immunoglobulin E Level, Serum Quant (11.07.17 @ 12:31)   Immunoglobulin E Level, Serum Quant: 87 IU/mL

## 2017-11-09 NOTE — PROGRESS NOTE ADULT - PROBLEM SELECTOR PROBLEM 2
Heart failure with reduced ejection fraction

## 2017-11-09 NOTE — DISCHARGE NOTE ADULT - SECONDARY DIAGNOSIS.
Congestive heart failure with cardiomyopathy Diabetes mellitus CKD (chronic kidney disease) stage 2, GFR 60-89 ml/min Diverticulitis Gout Hypertension

## 2017-11-09 NOTE — DISCHARGE NOTE ADULT - ADDITIONAL INSTRUCTIONS
Prednisone taper schedule:  30mg prednisone on 11/10/17  30mg prednisone on 11/11/17  20mg prednisone on 11/12/17  20mg prednisone on 11/13/17  10mg prednisone on 11/14/17  10mg prednisone on 11/15/17  STOP prednisone after 11/15/17

## 2017-11-09 NOTE — DISCHARGE NOTE ADULT - PLAN OF CARE
Follow up care Your asthma was well-controlled for most of your hospital stay on montelukast, albuterol, tiotropium, and prednisone. Please take the prednisone taper AS DESCRIBED ABOVE (reduce the amount you take by 10mg every 2 days). Please continue to take your inhalers as prescribed, and follow up with your pulmonary doctor within two weeks. If you experience dizziness, lightheadedness, acute shortness of breath, or change in skin color to blue, please seek emergency medical attention immediately. Monitor for signs/symptoms Please follow up with your cardiologist to monitor your CHF and for a possible echocardiogram (to evaluate your heart). Continue taking your statin, aspirin, enalapril, and lasix. If you feel palpitations, chest pain, or difficulty breathing, please seek medical attention immediately. Maintain current regimen Please check your blood sugar regularly, and take diabetes medications as prescribed. Note that we have ADDED metformin - a diabetes medication to your regimen. If your blood sugar is consistently high (>180), or you feel extreme dizziness, thirstiness, or frequent urination, please seek emergency medical attention immediately. Your CKD was well-controlled during your hospital stay. Please continue your current regimen and follow up with your PMD within two weeks. Your diverticulitis was well-controlled during your hospital stay. Please continue your current regimen and follow up with your PMD within two weeks. Your gout was well-controlled during your hospital stay. Please continue your current regimen and follow up with your PMD within two weeks. Please check your blood pressure regularly and take your blood pressure medications as prescribed. If it is consistently high (SBP >180 or DBP >90), or you experience intractable headaches, dizziness, changes in vision, chest pain, difficulty breathing, or movement difficulty, please seek emergency medical attention immediately. Your asthma was well-controlled for most of your hospital stay on montelukast, albuterol, tiotropium, and prednisone. Please take the prednisone taper AS DESCRIBED BELOW (reduce the amount you take by 10mg every 2 days). Please continue to take your inhalers as prescribed, and follow up with your pulmonary doctor within two weeks. If you experience dizziness, lightheadedness, acute shortness of breath, or change in skin color to blue, please seek emergency medical attention immediately.

## 2017-11-09 NOTE — PROGRESS NOTE ADULT - PROBLEM SELECTOR PROBLEM 6
CKD (chronic kidney disease) stage 2, GFR 60-89 ml/min

## 2017-11-09 NOTE — DISCHARGE NOTE ADULT - MEDICATION SUMMARY - MEDICATIONS TO STOP TAKING
I will STOP taking the medications listed below when I get home from the hospital:    albuterol-ipratropium 2.5 mg-0.5 mg/3 mL inhalation solution  -- 3 milliliter(s) inhaled 2 to 3 times a day

## 2017-11-29 ENCOUNTER — INPATIENT (INPATIENT)
Facility: HOSPITAL | Age: 72
LOS: 2 days | Discharge: HOME | DRG: 245 | End: 2017-12-02
Attending: INTERNAL MEDICINE | Admitting: STUDENT IN AN ORGANIZED HEALTH CARE EDUCATION/TRAINING PROGRAM
Payer: MEDICARE

## 2017-11-29 VITALS
TEMPERATURE: 98 F | SYSTOLIC BLOOD PRESSURE: 145 MMHG | OXYGEN SATURATION: 97 % | RESPIRATION RATE: 18 BRPM | HEART RATE: 100 BPM | DIASTOLIC BLOOD PRESSURE: 94 MMHG

## 2017-11-29 DIAGNOSIS — R00.2 PALPITATIONS: ICD-10-CM

## 2017-11-29 DIAGNOSIS — Z29.9 ENCOUNTER FOR PROPHYLACTIC MEASURES, UNSPECIFIED: ICD-10-CM

## 2017-11-29 DIAGNOSIS — E11.9 TYPE 2 DIABETES MELLITUS WITHOUT COMPLICATIONS: ICD-10-CM

## 2017-11-29 DIAGNOSIS — J44.9 CHRONIC OBSTRUCTIVE PULMONARY DISEASE, UNSPECIFIED: ICD-10-CM

## 2017-11-29 DIAGNOSIS — I42.9 CARDIOMYOPATHY, UNSPECIFIED: ICD-10-CM

## 2017-11-29 DIAGNOSIS — I47.2 VENTRICULAR TACHYCARDIA: ICD-10-CM

## 2017-11-29 DIAGNOSIS — N39.0 URINARY TRACT INFECTION, SITE NOT SPECIFIED: ICD-10-CM

## 2017-11-29 DIAGNOSIS — M10.9 GOUT, UNSPECIFIED: ICD-10-CM

## 2017-11-29 DIAGNOSIS — M25.519 PAIN IN UNSPECIFIED SHOULDER: ICD-10-CM

## 2017-11-29 DIAGNOSIS — Z45.02 ENCOUNTER FOR ADJUSTMENT AND MANAGEMENT OF AUTOMATIC IMPLANTABLE CARDIAC DEFIBRILLATOR: ICD-10-CM

## 2017-11-29 DIAGNOSIS — I50.9 HEART FAILURE, UNSPECIFIED: ICD-10-CM

## 2017-11-29 DIAGNOSIS — J45.909 UNSPECIFIED ASTHMA, UNCOMPLICATED: ICD-10-CM

## 2017-11-29 DIAGNOSIS — R74.8 ABNORMAL LEVELS OF OTHER SERUM ENZYMES: ICD-10-CM

## 2017-11-29 DIAGNOSIS — I10 ESSENTIAL (PRIMARY) HYPERTENSION: ICD-10-CM

## 2017-11-29 LAB
ALBUMIN SERPL ELPH-MCNC: 4.1 G/DL — SIGNIFICANT CHANGE UP (ref 3.3–5)
ALP SERPL-CCNC: 113 U/L — SIGNIFICANT CHANGE UP (ref 40–120)
ALT FLD-CCNC: 19 U/L RC — SIGNIFICANT CHANGE UP (ref 10–45)
ANION GAP SERPL CALC-SCNC: 15 MMOL/L — SIGNIFICANT CHANGE UP (ref 5–17)
APPEARANCE UR: CLEAR — SIGNIFICANT CHANGE UP
APTT BLD: 25 SEC — LOW (ref 27.5–37.4)
AST SERPL-CCNC: 17 U/L — SIGNIFICANT CHANGE UP (ref 10–40)
BASOPHILS # BLD AUTO: 0.1 K/UL — SIGNIFICANT CHANGE UP (ref 0–0.2)
BASOPHILS NFR BLD AUTO: 1 % — SIGNIFICANT CHANGE UP (ref 0–2)
BILIRUB SERPL-MCNC: 0.6 MG/DL — SIGNIFICANT CHANGE UP (ref 0.2–1.2)
BILIRUB UR-MCNC: NEGATIVE — SIGNIFICANT CHANGE UP
BUN SERPL-MCNC: 19 MG/DL — SIGNIFICANT CHANGE UP (ref 7–23)
CALCIUM SERPL-MCNC: 9.4 MG/DL — SIGNIFICANT CHANGE UP (ref 8.4–10.5)
CHLORIDE SERPL-SCNC: 103 MMOL/L — SIGNIFICANT CHANGE UP (ref 96–108)
CK MB BLD-MCNC: 2.5 % — SIGNIFICANT CHANGE UP (ref 0–3.5)
CK MB CFR SERPL CALC: 3.7 NG/ML — SIGNIFICANT CHANGE UP (ref 0–3.8)
CK MB CFR SERPL CALC: 4.2 NG/ML — HIGH (ref 0–3.8)
CK SERPL-CCNC: 148 U/L — SIGNIFICANT CHANGE UP (ref 25–170)
CO2 SERPL-SCNC: 27 MMOL/L — SIGNIFICANT CHANGE UP (ref 22–31)
COLOR SPEC: COLORLESS — SIGNIFICANT CHANGE UP
CREAT SERPL-MCNC: 1.04 MG/DL — SIGNIFICANT CHANGE UP (ref 0.5–1.3)
DIFF PNL FLD: NEGATIVE — SIGNIFICANT CHANGE UP
EOSINOPHIL # BLD AUTO: 0.2 K/UL — SIGNIFICANT CHANGE UP (ref 0–0.5)
EOSINOPHIL NFR BLD AUTO: 2 % — SIGNIFICANT CHANGE UP (ref 0–6)
EPI CELLS # UR: SIGNIFICANT CHANGE UP /HPF
GLUCOSE BLDC GLUCOMTR-MCNC: 104 MG/DL — HIGH (ref 70–99)
GLUCOSE BLDC GLUCOMTR-MCNC: 111 MG/DL — HIGH (ref 70–99)
GLUCOSE BLDC GLUCOMTR-MCNC: 116 MG/DL — HIGH (ref 70–99)
GLUCOSE BLDC GLUCOMTR-MCNC: 161 MG/DL — HIGH (ref 70–99)
GLUCOSE SERPL-MCNC: 114 MG/DL — HIGH (ref 70–99)
GLUCOSE UR QL: NEGATIVE — SIGNIFICANT CHANGE UP
HCT VFR BLD CALC: 40.6 % — SIGNIFICANT CHANGE UP (ref 34.5–45)
HGB BLD-MCNC: 13.3 G/DL — SIGNIFICANT CHANGE UP (ref 11.5–15.5)
HYALINE CASTS # UR AUTO: ABNORMAL
INR BLD: 0.98 RATIO — SIGNIFICANT CHANGE UP (ref 0.88–1.16)
KETONES UR-MCNC: NEGATIVE — SIGNIFICANT CHANGE UP
LACTATE SERPL-SCNC: 2.4 MMOL/L — HIGH (ref 0.7–2)
LEUKOCYTE ESTERASE UR-ACNC: ABNORMAL
LYMPHOCYTES # BLD AUTO: 2.1 K/UL — SIGNIFICANT CHANGE UP (ref 1–3.3)
LYMPHOCYTES # BLD AUTO: 25.1 % — SIGNIFICANT CHANGE UP (ref 13–44)
MCHC RBC-ENTMCNC: 32.9 GM/DL — SIGNIFICANT CHANGE UP (ref 32–36)
MCHC RBC-ENTMCNC: 32.9 PG — SIGNIFICANT CHANGE UP (ref 27–34)
MCV RBC AUTO: 100 FL — SIGNIFICANT CHANGE UP (ref 80–100)
MONOCYTES # BLD AUTO: 0.6 K/UL — SIGNIFICANT CHANGE UP (ref 0–0.9)
MONOCYTES NFR BLD AUTO: 7.5 % — SIGNIFICANT CHANGE UP (ref 2–14)
NEUTROPHILS # BLD AUTO: 5.3 K/UL — SIGNIFICANT CHANGE UP (ref 1.8–7.4)
NEUTROPHILS NFR BLD AUTO: 64.5 % — SIGNIFICANT CHANGE UP (ref 43–77)
NITRITE UR-MCNC: NEGATIVE — SIGNIFICANT CHANGE UP
NT-PROBNP SERPL-SCNC: 218 PG/ML — SIGNIFICANT CHANGE UP (ref 0–300)
PH UR: 5.5 — SIGNIFICANT CHANGE UP (ref 5–8)
PLATELET # BLD AUTO: 145 K/UL — LOW (ref 150–400)
POTASSIUM SERPL-MCNC: 4 MMOL/L — SIGNIFICANT CHANGE UP (ref 3.5–5.3)
POTASSIUM SERPL-SCNC: 4 MMOL/L — SIGNIFICANT CHANGE UP (ref 3.5–5.3)
PROT SERPL-MCNC: 7.3 G/DL — SIGNIFICANT CHANGE UP (ref 6–8.3)
PROT UR-MCNC: NEGATIVE — SIGNIFICANT CHANGE UP
PROTHROM AB SERPL-ACNC: 10.6 SEC — SIGNIFICANT CHANGE UP (ref 9.8–12.7)
RBC # BLD: 4.06 M/UL — SIGNIFICANT CHANGE UP (ref 3.8–5.2)
RBC # FLD: 12.9 % — SIGNIFICANT CHANGE UP (ref 10.3–14.5)
RBC CASTS # UR COMP ASSIST: SIGNIFICANT CHANGE UP /HPF (ref 0–2)
SODIUM SERPL-SCNC: 145 MMOL/L — SIGNIFICANT CHANGE UP (ref 135–145)
SP GR SPEC: 1.01 — SIGNIFICANT CHANGE UP (ref 1.01–1.02)
TROPONIN T SERPL-MCNC: 0.01 NG/ML — SIGNIFICANT CHANGE UP (ref 0–0.06)
TROPONIN T SERPL-MCNC: <0.01 NG/ML — SIGNIFICANT CHANGE UP (ref 0–0.06)
TSH SERPL-MCNC: 0.82 UIU/ML — SIGNIFICANT CHANGE UP (ref 0.27–4.2)
UROBILINOGEN FLD QL: NEGATIVE — SIGNIFICANT CHANGE UP
WBC # BLD: 8.2 K/UL — SIGNIFICANT CHANGE UP (ref 3.8–10.5)
WBC # FLD AUTO: 8.2 K/UL — SIGNIFICANT CHANGE UP (ref 3.8–10.5)
WBC UR QL: SIGNIFICANT CHANGE UP /HPF (ref 0–5)

## 2017-11-29 PROCEDURE — 99223 1ST HOSP IP/OBS HIGH 75: CPT

## 2017-11-29 PROCEDURE — 99285 EMERGENCY DEPT VISIT HI MDM: CPT | Mod: 25

## 2017-11-29 PROCEDURE — 93010 ELECTROCARDIOGRAM REPORT: CPT

## 2017-11-29 PROCEDURE — 71020: CPT | Mod: 26

## 2017-11-29 RX ORDER — ASPIRIN/CALCIUM CARB/MAGNESIUM 324 MG
81 TABLET ORAL DAILY
Qty: 0 | Refills: 0 | Status: DISCONTINUED | OUTPATIENT
Start: 2017-11-29 | End: 2017-12-02

## 2017-11-29 RX ORDER — SODIUM CHLORIDE 9 MG/ML
500 INJECTION INTRAMUSCULAR; INTRAVENOUS; SUBCUTANEOUS ONCE
Qty: 0 | Refills: 0 | Status: COMPLETED | OUTPATIENT
Start: 2017-11-29 | End: 2017-11-29

## 2017-11-29 RX ORDER — FAMOTIDINE 10 MG/ML
20 INJECTION INTRAVENOUS DAILY
Qty: 0 | Refills: 0 | Status: DISCONTINUED | OUTPATIENT
Start: 2017-11-29 | End: 2017-12-02

## 2017-11-29 RX ORDER — INSULIN LISPRO 100/ML
VIAL (ML) SUBCUTANEOUS EVERY 6 HOURS
Qty: 0 | Refills: 0 | Status: DISCONTINUED | OUTPATIENT
Start: 2017-11-29 | End: 2017-11-30

## 2017-11-29 RX ORDER — FUROSEMIDE 40 MG
1 TABLET ORAL
Qty: 0 | Refills: 0 | COMMUNITY

## 2017-11-29 RX ORDER — ENOXAPARIN SODIUM 100 MG/ML
40 INJECTION SUBCUTANEOUS EVERY 24 HOURS
Qty: 0 | Refills: 0 | Status: DISCONTINUED | OUTPATIENT
Start: 2017-11-29 | End: 2017-12-01

## 2017-11-29 RX ORDER — SODIUM CHLORIDE 9 MG/ML
1000 INJECTION, SOLUTION INTRAVENOUS
Qty: 0 | Refills: 0 | Status: DISCONTINUED | OUTPATIENT
Start: 2017-11-29 | End: 2017-11-30

## 2017-11-29 RX ORDER — SODIUM CHLORIDE 9 MG/ML
1000 INJECTION, SOLUTION INTRAVENOUS
Qty: 0 | Refills: 0 | Status: DISCONTINUED | OUTPATIENT
Start: 2017-11-29 | End: 2017-12-02

## 2017-11-29 RX ORDER — DEXTROSE 50 % IN WATER 50 %
25 SYRINGE (ML) INTRAVENOUS ONCE
Qty: 0 | Refills: 0 | Status: DISCONTINUED | OUTPATIENT
Start: 2017-11-29 | End: 2017-12-02

## 2017-11-29 RX ORDER — POTASSIUM CHLORIDE 20 MEQ
5 PACKET (EA) ORAL
Qty: 0 | Refills: 0 | COMMUNITY

## 2017-11-29 RX ORDER — MAGNESIUM OXIDE 400 MG ORAL TABLET 241.3 MG
1 TABLET ORAL
Qty: 0 | Refills: 0 | COMMUNITY

## 2017-11-29 RX ORDER — DEXTROSE 50 % IN WATER 50 %
1 SYRINGE (ML) INTRAVENOUS ONCE
Qty: 0 | Refills: 0 | Status: DISCONTINUED | OUTPATIENT
Start: 2017-11-29 | End: 2017-12-02

## 2017-11-29 RX ORDER — GLUCAGON INJECTION, SOLUTION 0.5 MG/.1ML
1 INJECTION, SOLUTION SUBCUTANEOUS ONCE
Qty: 0 | Refills: 0 | Status: DISCONTINUED | OUTPATIENT
Start: 2017-11-29 | End: 2017-12-02

## 2017-11-29 RX ORDER — ACETAMINOPHEN 500 MG
2 TABLET ORAL
Qty: 0 | Refills: 0 | COMMUNITY

## 2017-11-29 RX ORDER — DEXTROSE 50 % IN WATER 50 %
12.5 SYRINGE (ML) INTRAVENOUS ONCE
Qty: 0 | Refills: 0 | Status: DISCONTINUED | OUTPATIENT
Start: 2017-11-29 | End: 2017-12-02

## 2017-11-29 RX ORDER — LORATADINE 10 MG/1
10 TABLET ORAL DAILY
Qty: 0 | Refills: 0 | Status: DISCONTINUED | OUTPATIENT
Start: 2017-11-29 | End: 2017-12-02

## 2017-11-29 RX ORDER — INSULIN LISPRO 100/ML
VIAL (ML) SUBCUTANEOUS
Qty: 0 | Refills: 0 | Status: DISCONTINUED | OUTPATIENT
Start: 2017-11-29 | End: 2017-11-29

## 2017-11-29 RX ORDER — POTASSIUM CHLORIDE 20 MEQ
6.67 PACKET (EA) ORAL DAILY
Qty: 0 | Refills: 0 | Status: DISCONTINUED | OUTPATIENT
Start: 2017-11-29 | End: 2017-11-29

## 2017-11-29 RX ORDER — ATORVASTATIN CALCIUM 80 MG/1
20 TABLET, FILM COATED ORAL AT BEDTIME
Qty: 0 | Refills: 0 | Status: DISCONTINUED | OUTPATIENT
Start: 2017-11-29 | End: 2017-12-02

## 2017-11-29 RX ORDER — MENTHOL AND CAPSAICIN .0375; 5 G/100G; G/100G
1 PATCH TOPICAL
Qty: 0 | Refills: 0 | COMMUNITY

## 2017-11-29 RX ORDER — METFORMIN HYDROCHLORIDE 850 MG/1
1 TABLET ORAL
Qty: 30 | Refills: 0 | OUTPATIENT

## 2017-11-29 RX ORDER — ALBUTEROL 90 UG/1
2 AEROSOL, METERED ORAL EVERY 6 HOURS
Qty: 0 | Refills: 0 | Status: DISCONTINUED | OUTPATIENT
Start: 2017-11-29 | End: 2017-12-01

## 2017-11-29 RX ORDER — BUDESONIDE AND FORMOTEROL FUMARATE DIHYDRATE 160; 4.5 UG/1; UG/1
2 AEROSOL RESPIRATORY (INHALATION)
Qty: 0 | Refills: 0 | Status: DISCONTINUED | OUTPATIENT
Start: 2017-11-29 | End: 2017-12-02

## 2017-11-29 RX ORDER — CEFTRIAXONE 500 MG/1
1 INJECTION, POWDER, FOR SOLUTION INTRAMUSCULAR; INTRAVENOUS ONCE
Qty: 0 | Refills: 0 | Status: COMPLETED | OUTPATIENT
Start: 2017-11-29 | End: 2017-11-29

## 2017-11-29 RX ORDER — BENZOCAINE AND MENTHOL 5; 1 G/100ML; G/100ML
1 LIQUID ORAL
Qty: 21 | Refills: 0 | OUTPATIENT

## 2017-11-29 RX ORDER — TIOTROPIUM BROMIDE 18 UG/1
1 CAPSULE ORAL; RESPIRATORY (INHALATION) DAILY
Qty: 0 | Refills: 0 | Status: DISCONTINUED | OUTPATIENT
Start: 2017-11-29 | End: 2017-12-02

## 2017-11-29 RX ORDER — INSULIN GLARGINE 100 [IU]/ML
8 INJECTION, SOLUTION SUBCUTANEOUS AT BEDTIME
Qty: 0 | Refills: 0 | Status: DISCONTINUED | OUTPATIENT
Start: 2017-11-29 | End: 2017-12-02

## 2017-11-29 RX ADMIN — SODIUM CHLORIDE 500 MILLILITER(S): 9 INJECTION INTRAMUSCULAR; INTRAVENOUS; SUBCUTANEOUS at 09:31

## 2017-11-29 RX ADMIN — INSULIN GLARGINE 8 UNIT(S): 100 INJECTION, SOLUTION SUBCUTANEOUS at 23:17

## 2017-11-29 RX ADMIN — ATORVASTATIN CALCIUM 20 MILLIGRAM(S): 80 TABLET, FILM COATED ORAL at 22:42

## 2017-11-29 RX ADMIN — CEFTRIAXONE 100 GRAM(S): 500 INJECTION, POWDER, FOR SOLUTION INTRAMUSCULAR; INTRAVENOUS at 09:31

## 2017-11-29 RX ADMIN — BUDESONIDE AND FORMOTEROL FUMARATE DIHYDRATE 2 PUFF(S): 160; 4.5 AEROSOL RESPIRATORY (INHALATION) at 18:49

## 2017-11-29 RX ADMIN — SODIUM CHLORIDE 40 MILLILITER(S): 9 INJECTION, SOLUTION INTRAVENOUS at 22:36

## 2017-11-29 NOTE — H&P ADULT - PROBLEM SELECTOR PLAN 3
No symptoms or suprapubic tenderness- will discontinue antibiotics and treat as asymptomatic bacteriuria CKMB elevated likely due to demand ischemia from SVT vs. less likely ACS  - f/u repeat cardiac enzyme panel to trend troponin

## 2017-11-29 NOTE — H&P ADULT - PSH
AICD (Automatic Cardioverter/Defibrillator) Present  inserted in Aug, 2008. Due for battery change in 1 month. ( [employer]) . Inserted by Dr Duffy  S/P cholecystectomy    S/P Cholecystectomy

## 2017-11-29 NOTE — H&P ADULT - PROBLEM SELECTOR PLAN 6
Not on any maintenance medications, no active joint pains at present, continue montioring. Contineu enalapril 10mg po daily

## 2017-11-29 NOTE — ED ADULT NURSE NOTE - CHPI ED SYMPTOMS NEG
no decreased eating/drinking/no fever/no numbness/no vomiting/no tingling/no chills/no pain/no nausea/no dizziness

## 2017-11-29 NOTE — ED PROVIDER NOTE - PHYSICAL EXAMINATION
GEN: Well Appearing, Nontoxic, NAD  HEENT: Symm Facies, PERRL, EOMI, MMM, posterior pharynx clear  CV: No JVD/Bruits or stridor;  RRR w/o m/g/r  RESP: Diffuse mild wheezing bilaterally; good air movement  ABD: Soft, nt/nd, +bs, no guarding/rebound, no RUQ tender;  No CVAT  EXT: No lower extremity edema or calf tenderness. WWP, palpable pulses. FROMx4  SKIN: No erythema, lesions or rash  Neuro: Grossly intact, AOX3 with normal speech, CN II-XII intact; Sensation intact, motor 5/5 throughout; gait normal

## 2017-11-29 NOTE — H&P ADULT - PROBLEM SELECTOR PLAN 4
EF 2016 35%, most recent echo reports recovery of EF, pt now s/p AICD. No evidence of fluid overload at present.  - continue enalapril 10mg po daily  - continue lasix 20mg po daily prn leg swelling No symptoms or suprapubic tenderness- will discontinue antibiotics and treat as asymptomatic bacteriuria

## 2017-11-29 NOTE — ED ADULT NURSE NOTE - PSH
AICD (Automatic Cardioverter/Defibrillator) Present  inserted in Aug, 2008. Due for battery change in 1 month. ( [employer]) . Inserted by Dr Duffy  S/P cholecystectomy    S/P Cholecystectomy

## 2017-11-29 NOTE — ED PROVIDER NOTE - PROGRESS NOTE DETAILS
pt has elevated CKMB, concern for r/o ACS at this time. will treat uti with abx and give some light hydration. tba medicine on tele for monitoring and r/o. -Gladys Cantrell PA-C

## 2017-11-29 NOTE — ED PROVIDER NOTE - OBJECTIVE STATEMENT
73 y/o F pmhx asthma (numerous hospital admissions with resp failure and intubations including trach in 2016), CHF with reduced ejection fraction s/p AICD in 2008, HTN, HLD, DM2, diverculosis, gout, recent admission for an asthma exacerbation. 71 y/o F pmhx asthma (numerous hospital admissions with resp failure and intubations including trach in 2016), CHF with reduced ejection fraction s/p AICD in 2008, HTN, HLD, DM2, diverculosis, gout, recent admission for an asthma exacerbation, presenting with 'feeling of fluttering in chest' that started at 4AM. Patient states that she was at home, at rest and seated when she started feeling a 'fluttering fast' feeling her chest that suddenly made her very anxious. She reports that it felt like her heart was racing but also felt like 'she was suddenly getting very anxious.' She denies any pain in her chest, shortness of breath, sweating, nausea or vomiting occurring with this episode. Reports that she was not coughing when it occurred and has not had any fevers or worsening of her asthma since being home. Pt reports recent admission for asthma exacerbation and only new medicine started during admission was metformin. Pt denies ever feeling this in the past. Reports symptoms have started to resolve since being in the ED.

## 2017-11-29 NOTE — ED ADULT NURSE NOTE - OBJECTIVE STATEMENT
71 yo female reported to ED complaining of weakness. Pt A&Ox3. PMH asthma, CHF (AICD placed in 2008), HTN, DM, hyperlipidemia, & gout. Recent admission (< 2 weeks ago for asthma exacerbation). Pt reports "I don't feel well" and generalized weakness. Pt also reports an "irritation" of the heart that started around 0400 AM. Pt also reports "fluttering" and anxiety from last night. Pt also reports a productive cough - no blood in the cough as per pt. Expiratory wheeze bilaterally. Pt reports she took her lasix, which she usually does not take, last night because she is reporting more swelling in her lower legs bilaterally. Pt currently denies CP, SOB, N/V/D, fevers, and chills. Daughter, Yeimy, at the bedside. Will continue to monitor.

## 2017-11-29 NOTE — CONSULT NOTE ADULT - ASSESSMENT
71 yo F with PMhx of obesity, HFrEF, NICMP s/p CRT-D,  HTN, HLD, DM2, diverticulosis, gout, asthma presented to ER today with 'feeling fluttering of heart'.  CRT-D interrogated : had 2 episode of VT in April & June which treated with ATP.  Also battery reached RRT on 10/1/17, Bi-V paced 98%.

## 2017-11-29 NOTE — H&P ADULT - PROBLEM SELECTOR PLAN 5
Contineu enalapril 10mg po daily EF 2016 35%, most recent echo reports recovery of EF, pt now s/p AICD. No evidence of fluid overload at present.  - continue enalapril 10mg po daily  - continue lasix 20mg po daily prn leg swelling

## 2017-11-29 NOTE — H&P ADULT - PROBLEM SELECTOR PLAN 2
Doubt referred pain from cardiac ischemia given not temporally related to palpitations. More likely muskuloskeletal pain  - tyleno prn pain  - repeat cardiac enzyme panel to r/o acs

## 2017-11-29 NOTE — H&P ADULT - HISTORY OF PRESENT ILLNESS
72f hx obesity, systolic CHF s/p AICD, HTN, HLD, DM2, diverticulosis, gout, asthma with frequent exacerbations and prior intubations, recent hospital admission 2 weeks ago for asthma exacerbation presenting today with several episodes of 'chest fluttering' since this morning aroudn 430am. No prior episodes. Palpitations came on suddenly, no chest pain, shortness of breath of lightheadedness. Localized to substernal region without radiation. No wheezing or cough. Palpitations seem to come and go, no discernible trigger. At present, notes fluttering has  down but patient now reports R shoulder pain, which came on and remitted spontaneously, followed by L shoulder + arm 'annoyance' and not kristian pain, squeezing in nature. No preceding injury/truama/overuse.     In ED; 97.7, 100, 145/94, 18, 97RA. Given ceftriaxone 1gm iv x 1, normal saline 500cc bolus x 1.

## 2017-11-29 NOTE — ED PROVIDER NOTE - PSH
AICD (Automatic Cardioverter/Defibrillator) Present  inserted in Aug, 2008. Due for battery change in 1 month. ( behaview) . Inserted by Dr Duffy  S/P cholecystectomy    S/P Cholecystectomy

## 2017-11-29 NOTE — H&P ADULT - NSHPLABSRESULTS_GEN_ALL_CORE
.  LABS:                         13.3   8.2   )-----------( 145      ( 2017 08:26 )             40.6         145  |  103  |  19  ----------------------------<  114<H>  4.0   |  27  |  1.04    Ca    9.4      2017 08:26    TPro  7.3  /  Alb  4.1  /  TBili  0.6  /  DBili  x   /  AST  17  /  ALT  19  /  AlkPhos  113      PT/INR - ( 2017 08:26 )   PT: 10.6 sec;   INR: 0.98 ratio         PTT - ( 2017 08:26 )  PTT:25.0 sec  Urinalysis Basic - ( 2017 08:26 )    Color: Colorless / Appearance: Clear / S.011 / pH: x  Gluc: x / Ketone: Negative  / Bili: Negative / Urobili: Negative   Blood: x / Protein: Negative / Nitrite: Negative   Leuk Esterase: Large / RBC: 0-2 /HPF / WBC 26-50 /HPF   Sq Epi: x / Non Sq Epi: OCC /HPF / Bacteria: x      Serum Pro-Brain Natriuretic Peptide: 218 pg/mL ( @ 08:26)      EKG personally reviewed: paced rhtyhm at rate of 91  CXR personally reviewed: mild pulmonary congestion without obvious infiltrates or effusions, +globular heart sillouhette with cardiomegaly, +AICD hardware seen

## 2017-11-29 NOTE — H&P ADULT - PROBLEM SELECTOR PLAN 1
Tele monitor in ED captured 1 minute burst of SVT HR to 150's without firing, suspect palpitations are related to SVT in the setting of improperly functioning ICD.  - f/u device interrogation  - Tele monitor in ED captured 1 minute burst of SVT HR to 150's without firing, suspect palpitations are related to SVT in the setting of improperly functioning ICD.  - f/u device interrogation  - continue telemetry monitoring  - f/u EP recommendations

## 2017-11-29 NOTE — ED PROVIDER NOTE - MEDICAL DECISION MAKING DETAILS
71 y/o F recent admission for asthma exacerbation and recent started on metformin during this admission p/w feeling of heart fluttering started suddenly while at rest at 4AM. No cp, sob, n/v, diaphoresis; PE unremarkable aside from diffuse wheezing likely chronic. Will obtain cardiac work-up, ekg, cxr and reassess. 73 y/o F recent admission for asthma exacerbation and recent started on metformin during this admission p/w feeling of heart fluttering started suddenly while at rest at 4AM. No cp, sob, n/v, diaphoresis; PE unremarkable aside from diffuse wheezing likely chronic. Will obtain cardiac work-up, ekg, cxr and reassess.  Carlos: Patient with uncomfortable feeling in chest yesterday. will get labs, cxr, ekg, r/o acs, reassess.

## 2017-11-29 NOTE — ED PROVIDER NOTE - NS ED ROS FT
Constitutional: No fever or chills  Eyes: No visual changes, eye pain or redness  HEENT: No throat pain, ear pain, nasal pain. No nose bleeding.  CV: No chest pain or lower extremity edema. 'feeling of heart fluttering'  Resp: No SOB no cough  GI: No abd pain. No nausea or vomiting. No diarrhea. No constipation.   : No dysuria, hematuria.   MSK: No musculoskeletal pain  Skin: No rash  Neuro: No headache. No numbness or tingling. No weakness.

## 2017-11-30 DIAGNOSIS — J45.909 UNSPECIFIED ASTHMA, UNCOMPLICATED: ICD-10-CM

## 2017-11-30 DIAGNOSIS — I50.22 CHRONIC SYSTOLIC (CONGESTIVE) HEART FAILURE: ICD-10-CM

## 2017-11-30 DIAGNOSIS — N39.0 URINARY TRACT INFECTION, SITE NOT SPECIFIED: ICD-10-CM

## 2017-11-30 LAB
ANION GAP SERPL CALC-SCNC: 12 MMOL/L — SIGNIFICANT CHANGE UP (ref 5–17)
BUN SERPL-MCNC: 15 MG/DL — SIGNIFICANT CHANGE UP (ref 7–23)
CALCIUM SERPL-MCNC: 9.2 MG/DL — SIGNIFICANT CHANGE UP (ref 8.4–10.5)
CHLORIDE SERPL-SCNC: 100 MMOL/L — SIGNIFICANT CHANGE UP (ref 96–108)
CO2 SERPL-SCNC: 30 MMOL/L — SIGNIFICANT CHANGE UP (ref 22–31)
CREAT SERPL-MCNC: 0.9 MG/DL — SIGNIFICANT CHANGE UP (ref 0.5–1.3)
GLUCOSE BLDC GLUCOMTR-MCNC: 112 MG/DL — HIGH (ref 70–99)
GLUCOSE BLDC GLUCOMTR-MCNC: 124 MG/DL — HIGH (ref 70–99)
GLUCOSE BLDC GLUCOMTR-MCNC: 127 MG/DL — HIGH (ref 70–99)
GLUCOSE BLDC GLUCOMTR-MCNC: 191 MG/DL — HIGH (ref 70–99)
GLUCOSE BLDC GLUCOMTR-MCNC: 208 MG/DL — HIGH (ref 70–99)
GLUCOSE SERPL-MCNC: 116 MG/DL — HIGH (ref 70–99)
HBA1C BLD-MCNC: 8.2 % — HIGH (ref 4–5.6)
HCT VFR BLD CALC: 40.4 % — SIGNIFICANT CHANGE UP (ref 34.5–45)
HGB BLD-MCNC: 13.3 G/DL — SIGNIFICANT CHANGE UP (ref 11.5–15.5)
LACTATE SERPL-SCNC: 1.5 MMOL/L — SIGNIFICANT CHANGE UP (ref 0.7–2)
LACTATE SERPL-SCNC: 2.2 MMOL/L — HIGH (ref 0.7–2)
MAGNESIUM SERPL-MCNC: 1.6 MG/DL — SIGNIFICANT CHANGE UP (ref 1.6–2.6)
MCHC RBC-ENTMCNC: 33 GM/DL — SIGNIFICANT CHANGE UP (ref 32–36)
MCHC RBC-ENTMCNC: 33.1 PG — SIGNIFICANT CHANGE UP (ref 27–34)
MCV RBC AUTO: 100 FL — SIGNIFICANT CHANGE UP (ref 80–100)
PLATELET # BLD AUTO: 166 K/UL — SIGNIFICANT CHANGE UP (ref 150–400)
POTASSIUM SERPL-MCNC: 3.8 MMOL/L — SIGNIFICANT CHANGE UP (ref 3.5–5.3)
POTASSIUM SERPL-SCNC: 3.8 MMOL/L — SIGNIFICANT CHANGE UP (ref 3.5–5.3)
RBC # BLD: 4.03 M/UL — SIGNIFICANT CHANGE UP (ref 3.8–5.2)
RBC # FLD: 12.8 % — SIGNIFICANT CHANGE UP (ref 10.3–14.5)
SODIUM SERPL-SCNC: 142 MMOL/L — SIGNIFICANT CHANGE UP (ref 135–145)
WBC # BLD: 7.2 K/UL — SIGNIFICANT CHANGE UP (ref 3.8–10.5)
WBC # FLD AUTO: 7.2 K/UL — SIGNIFICANT CHANGE UP (ref 3.8–10.5)

## 2017-11-30 PROCEDURE — 93010 ELECTROCARDIOGRAM REPORT: CPT

## 2017-11-30 PROCEDURE — 99223 1ST HOSP IP/OBS HIGH 75: CPT

## 2017-11-30 PROCEDURE — 33264 RMVL & RPLCMT DFB GEN MLT LD: CPT

## 2017-11-30 PROCEDURE — 99233 SBSQ HOSP IP/OBS HIGH 50: CPT | Mod: 25

## 2017-11-30 PROCEDURE — 99233 SBSQ HOSP IP/OBS HIGH 50: CPT

## 2017-11-30 RX ORDER — MAGNESIUM SULFATE 500 MG/ML
1 VIAL (ML) INJECTION ONCE
Qty: 0 | Refills: 0 | Status: COMPLETED | OUTPATIENT
Start: 2017-11-30 | End: 2017-11-30

## 2017-11-30 RX ORDER — FUROSEMIDE 40 MG
20 TABLET ORAL
Qty: 0 | Refills: 0 | Status: DISCONTINUED | OUTPATIENT
Start: 2017-11-30 | End: 2017-12-01

## 2017-11-30 RX ORDER — INSULIN LISPRO 100/ML
VIAL (ML) SUBCUTANEOUS AT BEDTIME
Qty: 0 | Refills: 0 | Status: DISCONTINUED | OUTPATIENT
Start: 2017-11-30 | End: 2017-12-02

## 2017-11-30 RX ORDER — VANCOMYCIN HCL 1 G
1000 VIAL (EA) INTRAVENOUS ONCE
Qty: 0 | Refills: 0 | Status: COMPLETED | OUTPATIENT
Start: 2017-12-01 | End: 2017-12-01

## 2017-11-30 RX ORDER — POTASSIUM CHLORIDE 20 MEQ
40 PACKET (EA) ORAL ONCE
Qty: 0 | Refills: 0 | Status: COMPLETED | OUTPATIENT
Start: 2017-11-30 | End: 2017-11-30

## 2017-11-30 RX ORDER — INSULIN LISPRO 100/ML
VIAL (ML) SUBCUTANEOUS
Qty: 0 | Refills: 0 | Status: DISCONTINUED | OUTPATIENT
Start: 2017-11-30 | End: 2017-12-02

## 2017-11-30 RX ADMIN — Medication 10 MILLIGRAM(S): at 05:59

## 2017-11-30 RX ADMIN — LORATADINE 10 MILLIGRAM(S): 10 TABLET ORAL at 11:13

## 2017-11-30 RX ADMIN — Medication 40 MILLIEQUIVALENT(S): at 11:13

## 2017-11-30 RX ADMIN — Medication 2: at 11:13

## 2017-11-30 RX ADMIN — ATORVASTATIN CALCIUM 20 MILLIGRAM(S): 80 TABLET, FILM COATED ORAL at 22:43

## 2017-11-30 RX ADMIN — Medication 20 MILLIGRAM(S): at 22:45

## 2017-11-30 RX ADMIN — Medication 100 GRAM(S): at 10:53

## 2017-11-30 RX ADMIN — INSULIN GLARGINE 8 UNIT(S): 100 INJECTION, SOLUTION SUBCUTANEOUS at 22:43

## 2017-11-30 RX ADMIN — TIOTROPIUM BROMIDE 1 CAPSULE(S): 18 CAPSULE ORAL; RESPIRATORY (INHALATION) at 11:13

## 2017-11-30 RX ADMIN — BUDESONIDE AND FORMOTEROL FUMARATE DIHYDRATE 2 PUFF(S): 160; 4.5 AEROSOL RESPIRATORY (INHALATION) at 05:58

## 2017-11-30 RX ADMIN — FAMOTIDINE 20 MILLIGRAM(S): 10 INJECTION INTRAVENOUS at 11:13

## 2017-11-30 NOTE — PROGRESS NOTE ADULT - PROBLEM SELECTOR PLAN 7
slight wheeze on exam  -continue with tiotropium, albuterol, symbicort  -confirm spiriva vs. dulera (on allscrips dulera, but on med rec albuterol/spiriva).

## 2017-11-30 NOTE — CONSULT NOTE ADULT - ATTENDING COMMENTS
73 y/o F w/ h/o HFrEF (prior EF 25% 2009, subsequently improved to mild on most recent TTE 7/17), s/p [id number], HTN, HLD, DM2, diverticulosis, gout, asthma (prior intubation 1 year prior in setting of flu/pneumonia) req trach s/p decannulation who was admitted with chest fluttering (reportedly had atach). Noted to have prior episode of VT in April & June 2017 which treated with ATP. Planned for generator replacement today. Currently feels well but has noted LE edema; uses 2 pillows (flat) with some limited exertion per daughter. On exam, elevated JVD, RRR, no m/r/g, clear lungs, 1+ pitting edema b/l. Labs reviewed A1c 8.2,  (163 11/6/17). EKG sinus tach, BiV paced. TTE 4/17 with mild LV dysfunction with segmental wall motion abnl. Cath 12/09 normal coronaries. Overall stage C HF with possible HFrecEF with evidence of volume overload.   - continue lasix 20 mg PO bid; goal net out 1-2L   - continue enalapril 10 mg daily   - not on BB d/t severe asthma   - standing weights daily  - keep K>4, Mg>2  - possible to do generator replacement if necessary  - repeat TTE (last 4/17)

## 2017-11-30 NOTE — PROGRESS NOTE ADULT - PROBLEM SELECTOR PLAN 5
patient followed by ehart failure clinic  -reviewed CHF notes from Dr. Hobson, patient non-compliant with diet and lasix.  -on exam patient fluid overloaded, but compensated.  -c/w statin/ACE  -will restart lasix 20mg PO BID (dose prescribed at heart failure clinic).  -cannot be on BB 2/2 asthma

## 2017-11-30 NOTE — CONSULT NOTE ADULT - PROBLEM SELECTOR RECOMMENDATION 9
Agree with restarting low dose po lasix   daily standing weights   continue present medications   will follow after AICD generator  change   Reviewed with patient and family   reviewed withTeam

## 2017-11-30 NOTE — CONSULT NOTE ADULT - SUBJECTIVE AND OBJECTIVE BOX
REASON FOR CONSULT:    CHIEF COMPLAINT:    HPI:  72f hx obesity, systolic CHF  with EF 35& in 2016 with repeat echo In 2017 with normal systolic function with  s/p [id number] , HTN, HLD, DM2, diverticulosis, gout Mrs Dolan has had multiple admission for asthma with frequent exacerbations and a prior  intubation in 2016, requiring a trache as well Pt last hospital  admission  was in November fo r asthma exacerbation . She presents to [facility]  several episodes of 'chest fluttering'  Her BiV ICD interrogated on 11/29: had 2 episode of VT in April & June 2017 which treated with ATP. Also battery reached OLIVER (elective replacement indicator) on 10/1/17, Not PM dependent, Bi-V paced 98%. BiV ICD unable to store further episodes since OLIVER to perserve battery life.  Pt  is followed by Dr Hobson in the HF clinic and was well compensated at her admission in Sept 2017 . She takes PRN lasix  but no standing dosage. She prescribes to SOB with minimal exertion  to the BR which she attributes to her asthma , unable to climb stairs or walk one block with out SOB . She  sleeps with 2 pillows .The patient denies chest pain / LH or change in bowel/bladder habits  her appetite is excellent .She  prescribes  increasing bilateral leg edema but reports her weight is stable .  At present sitting inher chair she has elevated JVD and bilateral leg edema , her lungs are clear and denies increase SOB     REVIEW OF SYSTEMS: Negative unless otherwise mentioned in above HPI     CONSTITUTIONAL: No weakness, fevers or chills  EYES/ENT: No visual changes;  No vertigo or throat pain   NECK: No pain or stiffness  RESPIRATORY: No cough, wheezing, hemoptysis; No shortness of breath  CARDIOVASCULAR: No chest pain or palpitations  GASTROINTESTINAL: No abdominal or epigastric pain. No nausea, vomiting, or hematemesis; No diarrhea or constipation. No melena or hematochezia.  GENITOURINARY: No dysuria, frequency or hematuria  NEUROLOGICAL: No numbness or weakness  SKIN: No itching, burning, rashes, or lesions   All other review of systems is negative unless indicated above  Palpitations  No pertinent family history in first degree relatives    Diverticulitis  Cardiomyopathy  COPD (chronic obstructive pulmonary disease)  Cardiac Pacemaker  HTN - Hypertension Gout  Diabetes  Severe asthma     Ventricular tachycardia    ICD (implantable cardioverter-defibrillator) battery depletion     Last 24 Hrs  T(C): 36.7 (30 Nov 2017 14:13), Max: 36.9 (29 Nov 2017 20:25)  T(F): 98.1 (30 Nov 2017 14:13), Max: 98.4 (29 Nov 2017 20:25)  HR: 85 (30 Nov 2017 14:13) (76 - 95)  BP: 111/68 (30 Nov 2017 14:13) (111/68 - 147/76)  RR  18 (30 Nov 2017 14:13) (16 - 18)  SpO2: 96% (30 Nov 2017 14:13) (96% - 97%)    I&O's Summary    29 Nov 2017 07:01  -  30 Nov 2017 07:00  --------------------------------------------------------  IN: 360 mL / OUT: 600 mL / NET: -240 mL      Height (cm): 157.48 (11-29 @ 18:00)  Weight (kg): 80.3 (11-29 @ 18:00)  BMI (kg/m2): 32.4 (11-29 @ 18:00)  BSA (m2): 1.81 (11-29 @ 18:00)    PHYSICAL EXAM:    Constitutional: NAD, awake and alert, well-developed  HEENT: PERR, EOMI, Normal Hearing, MMM  Neck: Soft and supple,  elevated JVD  Respiratory: Breath sounds are clear bilaterally, No wheezing, rales or rhonchi  Cardiovascular: S1 and S2, regular rate and rhythm, no Murmurs, gallops or rubs  Gastrointestinal: Bowel Sounds present, soft, nontender, nondistended, no guarding, no rebound  Extremities: 2+  peripheral edema  Vascular: 2+ peripheral pulses  Neurological: A/O x 3, no focal deficits  Musculoskeletal: 5/5 strength b/l upper and lower extremities  Skin: No rashes    MEDICATIONS:  MEDICATIONS  (STANDING):  aspirin enteric coated 81 milliGRAM(s) Oral daily  atorvastatin 20 milliGRAM(s) Oral at bedtime  buDESOnide 160 MICROgram(s)/formoterol 4.5 MICROgram(s) Inhaler 2 Puff(s) Inhalation two times a day  dextrose 5%. 1000 milliLiter(s) (50 mL/Hr) IV Continuous <Continuous>  enalapril 10 milliGRAM(s) Oral daily  enoxaparin Injectable 40 milliGRAM(s) SubCutaneous every 24 hours  famotidine    Tablet 20 milliGRAM(s) Oral daily  furosemide    Tablet 20 milliGRAM(s) Oral two times a day  insulin glargine Injectable (LANTUS) 8 Unit(s) SubCutaneous at bedtime  insulin lispro (HumaLOG) corrective regimen sliding scale   SubCutaneous every 6 hours  loratadine 10 milliGRAM(s) Oral daily  tiotropium 18 MICROgram(s) Capsule 1 Capsule(s) Inhalation daily      LABS: All Labs Reviewed:                        13.3   7.2   )-----------( 166      ( 30 Nov 2017 08:50 )             40.4     11-30    142  |  100  |  15  ----------------------------<  116<H>  3.8   |  30  |  0.90    Ca    9.2      30 Nov 2017 08:49  Mg     1.6     11-30    TPro  7.3  /  Alb  4.1  /  TBili  0.6  /  DBili  x   /  AST  17  /  ALT  19  /  AlkPhos  113  11-29    PT/INR - ( 29 Nov 2017 08:26 )   PT: 10.6 sec;   INR: 0.98 ratio         PTT - ( 29 Nov 2017 08:26 )  PTT:25.0 sec  CARDIAC MARKERS ( 29 Nov 2017 14:35 )  x     / <0.01 ng/mL / 148 U/L / x     / 3.7 ng/mL  CARDIAC MARKERS ( 29 Nov 2017 08:26 )  x     / 0.01 ng/mL / x     / x     / 4.2 ng/mL
HPI:  71 yo F with PMhx of obesity, HFrEF, NICMP s/p CRT-D, HTN, HLD, DM2, diverticulosis, gout, asthma with frequent exacerbations and prior intubations, recent hospital admission 2 weeks ago for asthma exacerbation presenting today with several episodes of 'chest fluttering' since this morning around 4:30am. No prior episodes. Palpitations came on suddenly, no chest pain, shortness of breath of lightheadedness. Localized to substernal region without radiation. No wheezing or cough. Palpitations seem to come and go, no discernible trigger. At present, notes fluttering has  down but patient now reports R shoulder pain, which came on and remitted spontaneously, followed by L shoulder + arm 'annoyance' and not kristian pain, squeezing in nature. No preceding injury/truama/overuse.     In ED; 97.7, 100, 145/94, 18, 97RA. Given ceftriaxone 1gm iv x 1, normal saline 500cc bolus x 1.      PAST MEDICAL & SURGICAL HISTORY:  Diverticulitis  Cardiomyopathy  Kidney stone  COPD (chronic obstructive pulmonary disease)  Cardiac Pacemaker  HTN - Hypertension  Gout  Diabetes  Congestive Heart Failure  Asthma  S/P cholecystectomy  AICD (Automatic Cardioverter/Defibrillator) Present: inserted in Aug, 2008. s/p gen change in  Dr. Duffy  S/P Cholecystectomy      ROS:    General: Pt denies recent weight loss/fever/chills    Neurological: denies numbness or  sensation loss    HEENT: denies visual changes, no hearing loss, denies sore throat    Cardiovascular: denies chest pain/palpitations, (+) B/L leg edema    Respiratory and Thorax: denies SOB/cough/wheezing    Gastrointestinal: denies abdominal pain/diarrhea/constipation/bloody stool    Genitourinary: denies urinary frequency/urgency/ dysuria    Musculoskeletal: denies joint pain or swelling, denies restricted motion    Skin: denies rashes/sores    MEDICATIONS  (STANDING):  aspirin enteric coated 81 milliGRAM(s) Oral daily  atorvastatin 20 milliGRAM(s) Oral at bedtime  buDESOnide 160 MICROgram(s)/formoterol 4.5 MICROgram(s) Inhaler 2 Puff(s) Inhalation two times a day  dextrose 5%. 1000 milliLiter(s) (50 mL/Hr) IV Continuous <Continuous>  dextrose 50% Injectable 12.5 Gram(s) IV Push once  dextrose 50% Injectable 25 Gram(s) IV Push once  dextrose 50% Injectable 25 Gram(s) IV Push once  enalapril 10 milliGRAM(s) Oral daily  enoxaparin Injectable 40 milliGRAM(s) SubCutaneous every 24 hours  famotidine    Tablet 20 milliGRAM(s) Oral daily  insulin glargine Injectable (LANTUS) 8 Unit(s) SubCutaneous at bedtime  insulin lispro (HumaLOG) corrective regimen sliding scale   SubCutaneous Before meals and at bedtime  loratadine 10 milliGRAM(s) Oral daily  tiotropium 18 MICROgram(s) Capsule 1 Capsule(s) Inhalation daily    MEDICATIONS  (PRN):  ALBUTerol    90 MICROgram(s) HFA Inhaler 2 Puff(s) Inhalation every 6 hours PRN Shortness of Breath and/or Wheezing  dextrose Gel 1 Dose(s) Oral once PRN Blood Glucose LESS THAN 70 milliGRAM(s)/deciliter  glucagon  Injectable 1 milliGRAM(s) IntraMuscular once PRN Glucose LESS THAN 70 milligrams/deciliter      Allergies    Coreg (Other)  digoxin (Other; Short breath (Mild to Mod))  penicillins (Hives)  Solu-Medrol (Other)      FAMILY HISTORY:  No pertinent family history in first degree relatives      Physical Exam:    Vital Signs : /60       HR  100     RR 16 BT 97.9 SpO2 99%    Constitutional: well developed, well nourished, no deformities and no acute distress    Neurological: Alert & Oriented x 3, RDZ, no focal deficits    HEENT: NC/AT, PERRLA, EOMI,  Neck supple.    Respiratory: CTA B/L, No wheezing/crackles/rhonchi    Cardiovascular: (+) S1 & S2, RRR, No m/r/g    Gastrointestinal: soft, NT, nondistended, (+) BS    Genitourinary: non distended bladder, voiding freely    Extremities: (+) B/L leg edema    Skin:  normal skin color and pigmentation, no skin lesions        LABS:                        13.3   8.2   )-----------( 145      ( 2017 08:26 )             40.6         145  |  103  |  19  ----------------------------<  114<H>  4.0   |  27  |  1.04    Ca    9.4      2017 08:26    TPro  7.3  /  Alb  4.1  /  TBili  0.6  /  DBili  x   /  AST  17  /  ALT  19  /  AlkPhos  113      PT/INR - ( 2017 08:26 )   PT: 10.6 sec;   INR: 0.98 ratio         PTT - ( 2017 08:26 )  PTT:25.0 sec  Urinalysis Basic - ( 2017 08:26 )    Color: Colorless / Appearance: Clear / S.011 / pH: x  Gluc: x / Ketone: Negative  / Bili: Negative / Urobili: Negative   Blood: x / Protein: Negative / Nitrite: Negative   Leuk Esterase: Large / RBC: 0-2 /HPF / WBC 26-50 /HPF   Sq Epi: x / Non Sq Epi: OCC /HPF / Bacteria: x    < from: Xray Chest 2 Views PA/Lat (17 @ 08:17) >  Clear lungs.

## 2017-11-30 NOTE — PROGRESS NOTE ADULT - SUBJECTIVE AND OBJECTIVE BOX
Authored by Bert Frederick, DO: Beeper#[phone]    ANDRE BARKER  72y  Female      Patient is a 72y old  Female who presents with a chief complaint of Chest fluttering (29 Nov 2017 13:45)      INTERVAL HPI/OVERNIGHT EVENTS: No acute events overnight. Patient states had slight fluttering, transient, no more shoulder or chest pain.  States gained weight past 1-2 weeks, increased leg swelling, had ham for thanksgiving.     Vital Signs Last 24 Hrs  T(C): 36.7 (30 Nov 2017 14:13), Max: 36.9 (29 Nov 2017 20:25)  T(F): 98.1 (30 Nov 2017 14:13), Max: 98.4 (29 Nov 2017 20:25)  HR: 85 (30 Nov 2017 14:13) (76 - 95)  BP: 111/68 (30 Nov 2017 14:13) (111/68 - 147/76)  BP(mean): --  RR: 18 (30 Nov 2017 14:13) (16 - 18)  SpO2: 96% (30 Nov 2017 14:13) (96% - 97%)    PHYSICAL EXAM:  GENERAL: NAD  HEENT: Anicteric sclera  CARDS: +S1S2. rrr  RESP: Wheeze in RLL.  CTA otherwise.  ABD: obese.  NT/ND. +BS  EXT: +2 pitting edema b/l.  no cyanosis.   Tele: 22s of atach to 150's, otherwise NSR      Consultant(s) Notes Reviewed:  [x ] YES  [ ] NO  Care Discussed with Consultants/Other Providers [ x] YES  [ ] NO: EP NP    LABS:                        13.3   7.2   )-----------( 166      ( 30 Nov 2017 08:50 )             40.4   11-30    142  |  100  |  15  ----------------------------<  116<H>  3.8   |  30  |  0.90    Ca    9.2      30 Nov 2017 08:49  Mg     1.6     11-30    TPro  7.3  /  Alb  4.1  /  TBili  0.6  /  DBili  x   /  AST  17  /  ALT  19  /  AlkPhos  113  11-29      CAPILLARY BLOOD GLUCOSE  CAPILLARY BLOOD GLUCOSE      POCT Blood Glucose.: 208 mg/dL (30 Nov 2017 11:03)  POCT Blood Glucose.: 112 mg/dL (30 Nov 2017 05:46)  POCT Blood Glucose.: 124 mg/dL (30 Nov 2017 00:19)  POCT Blood Glucose.: 116 mg/dL (29 Nov 2017 23:14)  POCT Blood Glucose.: 104 mg/dL (29 Nov 2017 21:54)  POCT Blood Glucose.: 161 mg/dL (29 Nov 2017 18:06)      Urinalysis Basic - ( 29 Nov 2017 08:26 )    RADIOLOGY & ADDITIONAL TESTS:    Imaging Personally Reviewed:  [ ] YES  [ ] NO

## 2017-11-30 NOTE — CONSULT NOTE ADULT - ASSESSMENT
This is 72 year olf female with NICM with mildly reduced EF admitted with fluttering in her heart OLIVER on her AICD . She has bilateral leg edema and JVD appears comfortable

## 2017-11-30 NOTE — PROGRESS NOTE ADULT - SUBJECTIVE AND OBJECTIVE BOX
INTERVAL HPI/OVERNIGHT EVENTS: no acute event overnight      MEDICATIONS  (STANDING):  aspirin enteric coated 81 milliGRAM(s) Oral daily  atorvastatin 20 milliGRAM(s) Oral at bedtime  buDESOnide 160 MICROgram(s)/formoterol 4.5 MICROgram(s) Inhaler 2 Puff(s) Inhalation two times a day  dextrose 5%. 1000 milliLiter(s) (50 mL/Hr) IV Continuous <Continuous>  dextrose 50% Injectable 12.5 Gram(s) IV Push once  dextrose 50% Injectable 25 Gram(s) IV Push once  dextrose 50% Injectable 25 Gram(s) IV Push once  enalapril 10 milliGRAM(s) Oral daily  enoxaparin Injectable 40 milliGRAM(s) SubCutaneous every 24 hours  famotidine    Tablet 20 milliGRAM(s) Oral daily  insulin glargine Injectable (LANTUS) 8 Unit(s) SubCutaneous at bedtime  insulin lispro (HumaLOG) corrective regimen sliding scale   SubCutaneous every 6 hours  loratadine 10 milliGRAM(s) Oral daily  tiotropium 18 MICROgram(s) Capsule 1 Capsule(s) Inhalation daily    MEDICATIONS  (PRN):  ALBUTerol    90 MICROgram(s) HFA Inhaler 2 Puff(s) Inhalation every 6 hours PRN Shortness of Breath and/or Wheezing  dextrose Gel 1 Dose(s) Oral once PRN Blood Glucose LESS THAN 70 milliGRAM(s)/deciliter  glucagon  Injectable 1 milliGRAM(s) IntraMuscular once PRN Glucose LESS THAN 70 milligrams/deciliter      Allergies    Coreg (Other)  digoxin (Other; Short breath (Mild to Mod))  penicillins (Hives)  Solu-Medrol (Other)    ROS:  General: Pt denies recent weight loss/fever/chills    Neurological: denies numbness or  sensation loss    HEENT: denies visual changes, no hearing loss, denies sore throat    Cardiovascular: denies chest pain/palpitations/leg edema    Respiratory and Thorax: denies SOB/cough/wheezing    Gastrointestinal: denies abdominal pain/diarrhea/constipation/bloody stool    Genitourinary: denies urinary frequency/urgency/ dysuria    Musculoskeletal: denies joint pain or swelling, denies restricted motion    Skin: denies rashes/sores    Endocrine: denies heat or cold intolerance/excessive thirst    Hematologic: denies abnormal bleeding  	    Vital Signs Last 24 Hrs  T(C): 36.8 (2017 06:50), Max: 36.9 (2017 20:25)  T(F): 98.3 (2017 06:50), Max: 98.4 (2017 20:25)  HR: 83 (2017 06:50) (76 - 97)  BP: 135/88 (2017 06:50) (120/75 - 147/76)  BP(mean): 85 (2017 13:45) (85 - 85)  RR: 18 (2017 06:50) (16 - 20)  SpO2: 97% (2017 06:50) (96% - 99%)      Physical Exam:  General: NAD, OOB sitting up in chair    Neurological: Alert & Oriented x 3    HEENT: Neck supple    Respiratory: CTA B/L, No wheezing/crackles/rhonchi    Cardiovascular: (+) S1 & S2, RRR, No m/r/g    Gastrointestinal: soft, NT, nondistended, (+) BS    Genitourinary: non distended bladder, voiding freely    Extremities: (+) bilateral LE edema, No clubbing, No cyanosis    Skin:  normal skin color and pigmentation, no skin lesions      LABS:                        13.3   7.2   )-----------( 166      ( 2017 08:50 )             40.4     11-30    142  |  100  |  15  ----------------------------<  116<H>  3.8   |  30  |  0.90    Ca    9.2      2017 08:49  Mg     1.6     11-    TPro  7.3  /  Alb  4.1  /  TBili  0.6  /  DBili  x   /  AST  17  /  ALT  19  /  AlkPhos  113  11-    PT/INR - ( 2017 08:26 )   PT: 10.6 sec;   INR: 0.98 ratio         PTT - ( 2017 08:26 )  PTT:25.0 sec  Urinalysis Basic - ( 2017 08:26 )    Color: Colorless / Appearance: Clear / S.011 / pH: x  Gluc: x / Ketone: Negative  / Bili: Negative / Urobili: Negative   Blood: x / Protein: Negative / Nitrite: Negative   Leuk Esterase: Large / RBC: 0-2 /HPF / WBC 26-50 /HPF   Sq Epi: x / Non Sq Epi: OCC /HPF / Bacteria: x        TTE (17): Grossly mild LV systolic  dysfunction, no pericardial effusion.    TELE: A sense/ V pace at 80-90s, 8 beats NSVT, PAT x 22 sec at rate 140-150 bpm

## 2017-12-01 ENCOUNTER — TRANSCRIPTION ENCOUNTER (OUTPATIENT)
Age: 72
End: 2017-12-01

## 2017-12-01 DIAGNOSIS — I47.1 SUPRAVENTRICULAR TACHYCARDIA: ICD-10-CM

## 2017-12-01 DIAGNOSIS — I50.23 ACUTE ON CHRONIC SYSTOLIC (CONGESTIVE) HEART FAILURE: ICD-10-CM

## 2017-12-01 DIAGNOSIS — I48.92 UNSPECIFIED ATRIAL FLUTTER: ICD-10-CM

## 2017-12-01 DIAGNOSIS — E11.65 TYPE 2 DIABETES MELLITUS WITH HYPERGLYCEMIA: ICD-10-CM

## 2017-12-01 LAB
-  AMIKACIN: SIGNIFICANT CHANGE UP
-  AMPICILLIN/SULBACTAM: SIGNIFICANT CHANGE UP
-  AMPICILLIN: SIGNIFICANT CHANGE UP
-  AZTREONAM: SIGNIFICANT CHANGE UP
-  CEFAZOLIN: SIGNIFICANT CHANGE UP
-  CEFEPIME: SIGNIFICANT CHANGE UP
-  CEFOXITIN: SIGNIFICANT CHANGE UP
-  CEFTAZIDIME: SIGNIFICANT CHANGE UP
-  CEFTRIAXONE: SIGNIFICANT CHANGE UP
-  CIPROFLOXACIN: SIGNIFICANT CHANGE UP
-  ERTAPENEM: SIGNIFICANT CHANGE UP
-  GENTAMICIN: SIGNIFICANT CHANGE UP
-  IMIPENEM: SIGNIFICANT CHANGE UP
-  LEVOFLOXACIN: SIGNIFICANT CHANGE UP
-  MEROPENEM: SIGNIFICANT CHANGE UP
-  NITROFURANTOIN: SIGNIFICANT CHANGE UP
-  PIPERACILLIN/TAZOBACTAM: SIGNIFICANT CHANGE UP
-  TOBRAMYCIN: SIGNIFICANT CHANGE UP
-  TRIMETHOPRIM/SULFAMETHOXAZOLE: SIGNIFICANT CHANGE UP
ANION GAP SERPL CALC-SCNC: 13 MMOL/L — SIGNIFICANT CHANGE UP (ref 5–17)
BUN SERPL-MCNC: 15 MG/DL — SIGNIFICANT CHANGE UP (ref 7–23)
CALCIUM SERPL-MCNC: 9.5 MG/DL — SIGNIFICANT CHANGE UP (ref 8.4–10.5)
CHLORIDE SERPL-SCNC: 100 MMOL/L — SIGNIFICANT CHANGE UP (ref 96–108)
CO2 SERPL-SCNC: 28 MMOL/L — SIGNIFICANT CHANGE UP (ref 22–31)
CREAT SERPL-MCNC: 1.09 MG/DL — SIGNIFICANT CHANGE UP (ref 0.5–1.3)
CULTURE RESULTS: SIGNIFICANT CHANGE UP
GLUCOSE BLDC GLUCOMTR-MCNC: 127 MG/DL — HIGH (ref 70–99)
GLUCOSE BLDC GLUCOMTR-MCNC: 165 MG/DL — HIGH (ref 70–99)
GLUCOSE BLDC GLUCOMTR-MCNC: 185 MG/DL — HIGH (ref 70–99)
GLUCOSE BLDC GLUCOMTR-MCNC: 260 MG/DL — HIGH (ref 70–99)
GLUCOSE SERPL-MCNC: 176 MG/DL — HIGH (ref 70–99)
HCT VFR BLD CALC: 38.8 % — SIGNIFICANT CHANGE UP (ref 34.5–45)
HGB BLD-MCNC: 12.8 G/DL — SIGNIFICANT CHANGE UP (ref 11.5–15.5)
MAGNESIUM SERPL-MCNC: 1.6 MG/DL — SIGNIFICANT CHANGE UP (ref 1.6–2.6)
MCHC RBC-ENTMCNC: 33 GM/DL — SIGNIFICANT CHANGE UP (ref 32–36)
MCHC RBC-ENTMCNC: 33 PG — SIGNIFICANT CHANGE UP (ref 27–34)
MCV RBC AUTO: 100 FL — SIGNIFICANT CHANGE UP (ref 80–100)
METHOD TYPE: SIGNIFICANT CHANGE UP
ORGANISM # SPEC MICROSCOPIC CNT: SIGNIFICANT CHANGE UP
ORGANISM # SPEC MICROSCOPIC CNT: SIGNIFICANT CHANGE UP
PHOSPHATE SERPL-MCNC: 3.4 MG/DL — SIGNIFICANT CHANGE UP (ref 2.5–4.5)
PLATELET # BLD AUTO: 160 K/UL — SIGNIFICANT CHANGE UP (ref 150–400)
POTASSIUM SERPL-MCNC: 3.8 MMOL/L — SIGNIFICANT CHANGE UP (ref 3.5–5.3)
POTASSIUM SERPL-SCNC: 3.8 MMOL/L — SIGNIFICANT CHANGE UP (ref 3.5–5.3)
RBC # BLD: 3.88 M/UL — SIGNIFICANT CHANGE UP (ref 3.8–5.2)
RBC # FLD: 12.8 % — SIGNIFICANT CHANGE UP (ref 10.3–14.5)
SODIUM SERPL-SCNC: 141 MMOL/L — SIGNIFICANT CHANGE UP (ref 135–145)
SPECIMEN SOURCE: SIGNIFICANT CHANGE UP
WBC # BLD: 8.6 K/UL — SIGNIFICANT CHANGE UP (ref 3.8–10.5)
WBC # FLD AUTO: 8.6 K/UL — SIGNIFICANT CHANGE UP (ref 3.8–10.5)

## 2017-12-01 PROCEDURE — 99233 SBSQ HOSP IP/OBS HIGH 50: CPT

## 2017-12-01 PROCEDURE — 93010 ELECTROCARDIOGRAM REPORT: CPT

## 2017-12-01 RX ORDER — SPIRONOLACTONE 25 MG/1
0.5 TABLET, FILM COATED ORAL
Qty: 15 | Refills: 0 | OUTPATIENT
Start: 2017-12-01 | End: 2017-12-31

## 2017-12-01 RX ORDER — FUROSEMIDE 40 MG
20 TABLET ORAL
Qty: 0 | Refills: 0 | Status: COMPLETED | OUTPATIENT
Start: 2017-12-01 | End: 2017-12-01

## 2017-12-01 RX ORDER — DOCUSATE SODIUM 100 MG
100 CAPSULE ORAL THREE TIMES A DAY
Qty: 0 | Refills: 0 | Status: DISCONTINUED | OUTPATIENT
Start: 2017-12-01 | End: 2017-12-02

## 2017-12-01 RX ORDER — FUROSEMIDE 40 MG
20 TABLET ORAL DAILY
Qty: 0 | Refills: 0 | Status: DISCONTINUED | OUTPATIENT
Start: 2017-12-02 | End: 2017-12-02

## 2017-12-01 RX ORDER — DIGOXIN 250 MCG
0.25 TABLET ORAL ONCE
Qty: 0 | Refills: 0 | Status: DISCONTINUED | OUTPATIENT
Start: 2017-12-01 | End: 2017-12-01

## 2017-12-01 RX ORDER — POTASSIUM CHLORIDE 20 MEQ
40 PACKET (EA) ORAL ONCE
Qty: 0 | Refills: 0 | Status: COMPLETED | OUTPATIENT
Start: 2017-12-01 | End: 2017-12-01

## 2017-12-01 RX ORDER — SENNA PLUS 8.6 MG/1
2 TABLET ORAL AT BEDTIME
Qty: 0 | Refills: 0 | Status: DISCONTINUED | OUTPATIENT
Start: 2017-12-01 | End: 2017-12-02

## 2017-12-01 RX ORDER — MAGNESIUM SULFATE 500 MG/ML
2 VIAL (ML) INJECTION ONCE
Qty: 0 | Refills: 0 | Status: COMPLETED | OUTPATIENT
Start: 2017-12-01 | End: 2017-12-01

## 2017-12-01 RX ORDER — ACETAMINOPHEN 500 MG
650 TABLET ORAL EVERY 6 HOURS
Qty: 0 | Refills: 0 | Status: DISCONTINUED | OUTPATIENT
Start: 2017-12-01 | End: 2017-12-02

## 2017-12-01 RX ORDER — ENOXAPARIN SODIUM 100 MG/ML
40 INJECTION SUBCUTANEOUS EVERY 24 HOURS
Qty: 0 | Refills: 0 | Status: DISCONTINUED | OUTPATIENT
Start: 2017-12-01 | End: 2017-12-01

## 2017-12-01 RX ORDER — SPIRONOLACTONE 25 MG/1
12.5 TABLET, FILM COATED ORAL DAILY
Qty: 0 | Refills: 0 | Status: DISCONTINUED | OUTPATIENT
Start: 2017-12-01 | End: 2017-12-02

## 2017-12-01 RX ORDER — APIXABAN 2.5 MG/1
5 TABLET, FILM COATED ORAL EVERY 12 HOURS
Qty: 0 | Refills: 0 | Status: DISCONTINUED | OUTPATIENT
Start: 2017-12-01 | End: 2017-12-02

## 2017-12-01 RX ORDER — ALBUTEROL 90 UG/1
2 AEROSOL, METERED ORAL EVERY 6 HOURS
Qty: 0 | Refills: 0 | Status: DISCONTINUED | OUTPATIENT
Start: 2017-12-01 | End: 2017-12-02

## 2017-12-01 RX ORDER — ACETAMINOPHEN 500 MG
650 TABLET ORAL ONCE
Qty: 0 | Refills: 0 | Status: COMPLETED | OUTPATIENT
Start: 2017-12-01 | End: 2017-12-01

## 2017-12-01 RX ORDER — WARFARIN SODIUM 2.5 MG/1
5 TABLET ORAL ONCE
Qty: 0 | Refills: 0 | Status: DISCONTINUED | OUTPATIENT
Start: 2017-12-01 | End: 2017-12-01

## 2017-12-01 RX ADMIN — ALBUTEROL 2 PUFF(S): 90 AEROSOL, METERED ORAL at 17:29

## 2017-12-01 RX ADMIN — APIXABAN 5 MILLIGRAM(S): 2.5 TABLET, FILM COATED ORAL at 19:33

## 2017-12-01 RX ADMIN — Medication 250 MILLIGRAM(S): at 06:24

## 2017-12-01 RX ADMIN — ENOXAPARIN SODIUM 40 MILLIGRAM(S): 100 INJECTION SUBCUTANEOUS at 12:58

## 2017-12-01 RX ADMIN — Medication 650 MILLIGRAM(S): at 22:54

## 2017-12-01 RX ADMIN — Medication 20 MILLIGRAM(S): at 06:23

## 2017-12-01 RX ADMIN — ALBUTEROL 2 PUFF(S): 90 AEROSOL, METERED ORAL at 01:00

## 2017-12-01 RX ADMIN — INSULIN GLARGINE 8 UNIT(S): 100 INJECTION, SOLUTION SUBCUTANEOUS at 22:54

## 2017-12-01 RX ADMIN — BUDESONIDE AND FORMOTEROL FUMARATE DIHYDRATE 2 PUFF(S): 160; 4.5 AEROSOL RESPIRATORY (INHALATION) at 06:23

## 2017-12-01 RX ADMIN — Medication 650 MILLIGRAM(S): at 14:55

## 2017-12-01 RX ADMIN — Medication 40 MILLIEQUIVALENT(S): at 12:59

## 2017-12-01 RX ADMIN — ALBUTEROL 2 PUFF(S): 90 AEROSOL, METERED ORAL at 22:54

## 2017-12-01 RX ADMIN — Medication 20 MILLIGRAM(S): at 17:29

## 2017-12-01 RX ADMIN — Medication 650 MILLIGRAM(S): at 06:23

## 2017-12-01 RX ADMIN — ATORVASTATIN CALCIUM 20 MILLIGRAM(S): 80 TABLET, FILM COATED ORAL at 22:54

## 2017-12-01 RX ADMIN — Medication 1: at 07:49

## 2017-12-01 RX ADMIN — Medication 3: at 12:02

## 2017-12-01 RX ADMIN — LORATADINE 10 MILLIGRAM(S): 10 TABLET ORAL at 12:02

## 2017-12-01 RX ADMIN — Medication 10 MILLIGRAM(S): at 06:23

## 2017-12-01 RX ADMIN — Medication 650 MILLIGRAM(S): at 15:30

## 2017-12-01 RX ADMIN — BUDESONIDE AND FORMOTEROL FUMARATE DIHYDRATE 2 PUFF(S): 160; 4.5 AEROSOL RESPIRATORY (INHALATION) at 17:29

## 2017-12-01 RX ADMIN — FAMOTIDINE 20 MILLIGRAM(S): 10 INJECTION INTRAVENOUS at 12:02

## 2017-12-01 RX ADMIN — Medication 50 GRAM(S): at 12:58

## 2017-12-01 RX ADMIN — Medication 100 MILLIGRAM(S): at 12:58

## 2017-12-01 RX ADMIN — SPIRONOLACTONE 12.5 MILLIGRAM(S): 25 TABLET, FILM COATED ORAL at 14:53

## 2017-12-01 RX ADMIN — Medication 81 MILLIGRAM(S): at 12:02

## 2017-12-01 RX ADMIN — TIOTROPIUM BROMIDE 1 CAPSULE(S): 18 CAPSULE ORAL; RESPIRATORY (INHALATION) at 12:03

## 2017-12-01 NOTE — DIETITIAN INITIAL EVALUATION ADULT. - PROBLEM SELECTOR PLAN 5
EF 2016 35%, most recent echo reports recovery of EF, pt now s/p AICD. No evidence of fluid overload at present.  - continue enalapril 10mg po daily  - continue lasix 20mg po daily prn leg swelling

## 2017-12-01 NOTE — DISCHARGE NOTE ADULT - PROVIDER TOKENS
FREE:[LAST:[Neena],FIRST:[Souleymane],PHONE:[(   )    -],FAX:[(   )    -]] FREE:[LAST:[Neena],FIRST:[Souleymane],PHONE:[(   )    -],FAX:[(   )    -]],TOKEN:'[id number]'

## 2017-12-01 NOTE — DIETITIAN INITIAL EVALUATION ADULT. - PROBLEM SELECTOR PLAN 4
No symptoms or suprapubic tenderness- will discontinue antibiotics and treat as asymptomatic bacteriuria

## 2017-12-01 NOTE — PROGRESS NOTE ADULT - SUBJECTIVE AND OBJECTIVE BOX
Subjective:    Medications:  ALBUTerol    90 MICROgram(s) HFA Inhaler 2 Puff(s) Inhalation every 6 hours PRN  aspirin enteric coated 81 milliGRAM(s) Oral daily  atorvastatin 20 milliGRAM(s) Oral at bedtime  buDESOnide 160 MICROgram(s)/formoterol 4.5 MICROgram(s) Inhaler 2 Puff(s) Inhalation two times a day  dextrose 5%. 1000 milliLiter(s) IV Continuous <Continuous>  dextrose 50% Injectable 12.5 Gram(s) IV Push once  dextrose 50% Injectable 25 Gram(s) IV Push once  dextrose 50% Injectable 25 Gram(s) IV Push once  dextrose Gel 1 Dose(s) Oral once PRN  enalapril 10 milliGRAM(s) Oral daily  famotidine    Tablet 20 milliGRAM(s) Oral daily  furosemide    Tablet 20 milliGRAM(s) Oral two times a day  glucagon  Injectable 1 milliGRAM(s) IntraMuscular once PRN  guaiFENesin   Syrup  (Sugar-Free) 100 milliGRAM(s) Oral every 6 hours PRN  insulin glargine Injectable (LANTUS) 8 Unit(s) SubCutaneous at bedtime  insulin lispro (HumaLOG) corrective regimen sliding scale   SubCutaneous three times a day before meals  insulin lispro (HumaLOG) corrective regimen sliding scale   SubCutaneous at bedtime  loratadine 10 milliGRAM(s) Oral daily  tiotropium 18 MICROgram(s) Capsule 1 Capsule(s) Inhalation daily      Physical Exam:    Vitals:  T(C): 37 (17 @ 04:49), Max: 37 (17 @ 04:49)  HR: 88 (17 @ 04:49) (84 - 103)  BP: 140/71 (17 @ 04:49) (111/68 - 140/71)  RR: 18 (17 @ 04:49) (18 - 20)  SpO2: 97% (17 @ 04:49) (96% - 98%)      Daily     Daily Weight in k.7 (01 Dec 2017 04:49)    I&O's Summary    2017 07:  -  01 Dec 2017 07:00  --------------------------------------------------------  IN: 460 mL / OUT: 1200 mL / NET: -740 mL      General: No distress. Comfortable.  HEENT: EOM intact.  Neck: Neck supple. JVP not elevated. No masses  Chest: Clear to auscultation bilaterally  CV: Normal S1 and S2. No murmurs, rub, or gallops. Radial pulses normal.  Abdomen: Soft, non-distended, non-tender  Skin: No rashes or skin breakdown  Neurology: Alert and oriented times three. Sensation intact  Psych: Affect normal    Labs:                        13.3   7.2   )-----------( 166      ( 2017 08:50 )             40.4         142  |  100  |  15  ----------------------------<  116<H>  3.8   |  30  |  0.90    Ca    9.2      2017 08:49  Mg     1.6           CARDIAC MARKERS ( 2017 14:35 )  x     / <0.01 ng/mL / 148 U/L / x     / 3.7 ng/mL      Serum Pro-Brain Natriuretic Peptide: 218 pg/mL ( @ 08:26)    Lactate, Blood: 1.5 mmol/L ( @ 16:34)  Lactate, Blood: 2.2 mmol/L ( @ 13:21)  Lactate, Blood: 2.4 mmol/L ( @ 14:35) Subjective: No acute events overnight. Pt notes SILVA with short distances, such as walking to bathroom. Denies SOB at rest, CP, palpitations, orthopnea, PND.     Medications:  ALBUTerol    90 MICROgram(s) HFA Inhaler 2 Puff(s) Inhalation every 6 hours PRN  aspirin enteric coated 81 milliGRAM(s) Oral daily  atorvastatin 20 milliGRAM(s) Oral at bedtime  buDESOnide 160 MICROgram(s)/formoterol 4.5 MICROgram(s) Inhaler 2 Puff(s) Inhalation two times a day  dextrose 5%. 1000 milliLiter(s) IV Continuous <Continuous>  dextrose 50% Injectable 12.5 Gram(s) IV Push once  dextrose 50% Injectable 25 Gram(s) IV Push once  dextrose 50% Injectable 25 Gram(s) IV Push once  dextrose Gel 1 Dose(s) Oral once PRN  enalapril 10 milliGRAM(s) Oral daily  famotidine    Tablet 20 milliGRAM(s) Oral daily  furosemide    Tablet 20 milliGRAM(s) Oral two times a day  glucagon  Injectable 1 milliGRAM(s) IntraMuscular once PRN  guaiFENesin   Syrup  (Sugar-Free) 100 milliGRAM(s) Oral every 6 hours PRN  insulin glargine Injectable (LANTUS) 8 Unit(s) SubCutaneous at bedtime  insulin lispro (HumaLOG) corrective regimen sliding scale   SubCutaneous three times a day before meals  insulin lispro (HumaLOG) corrective regimen sliding scale   SubCutaneous at bedtime  loratadine 10 milliGRAM(s) Oral daily  tiotropium 18 MICROgram(s) Capsule 1 Capsule(s) Inhalation daily      Physical Exam:    Vitals:  T(C): 37 (17 @ 04:49), Max: 37 (17 @ 04:49)  HR: 88 (17 @ 04:49) (84 - 103)  BP: 140/71 (17 @ 04:49) (111/68 - 140/71)  RR: 18 (17 @ 04:49) (18 - 20)  SpO2: 97% (17 @ 04:49) (96% - 98%)    Tele: SR vpaced vs aflutter vpaced.     Daily     Daily Weight in k.7 (01 Dec 2017 04:49) <- 80.4 kg     I&O's Summary    2017 07:01  -  01 Dec 2017 07:00  --------------------------------------------------------  IN: 460 mL / OUT: 1200 mL / NET: -740 mL      General: No distress. Comfortable.  HEENT: EOM intact.  Neck: Neck supple. JVP not elevated. No masses  Chest: Clear to auscultation bilaterally  CV: Normal S1 and S2. No murmurs, rub, or gallops. Radial pulses normal.  Abdomen: Soft, non-distended, non-tender  Skin: No rashes or skin breakdown  Neurology: Alert and oriented times three. Sensation intact  Psych: Affect normal    Labs:                        13.3   7.2   )-----------( 166      ( 2017 08:50 )             40.4         142  |  100  |  15  ----------------------------<  116<H>  3.8   |  30  |  0.90    Ca    9.2      2017 08:49  Mg     1.6           CARDIAC MARKERS ( 2017 14:35 )  x     / <0.01 ng/mL / 148 U/L / x     / 3.7 ng/mL      Serum Pro-Brain Natriuretic Peptide: 218 pg/mL ( @ 08:26)    Lactate, Blood: 1.5 mmol/L ( @ 16:34)  Lactate, Blood: 2.2 mmol/L ( @ 13:21)  Lactate, Blood: 2.4 mmol/L ( @ 14:35) Subjective: No acute events overnight. Pt notes SILVA with short distances, such as walking to bathroom. Denies SOB at rest, CP, palpitations, orthopnea, PND.     Medications:  ALBUTerol    90 MICROgram(s) HFA Inhaler 2 Puff(s) Inhalation every 6 hours PRN  aspirin enteric coated 81 milliGRAM(s) Oral daily  atorvastatin 20 milliGRAM(s) Oral at bedtime  buDESOnide 160 MICROgram(s)/formoterol 4.5 MICROgram(s) Inhaler 2 Puff(s) Inhalation two times a day  dextrose 5%. 1000 milliLiter(s) IV Continuous <Continuous>  dextrose 50% Injectable 12.5 Gram(s) IV Push once  dextrose 50% Injectable 25 Gram(s) IV Push once  dextrose 50% Injectable 25 Gram(s) IV Push once  dextrose Gel 1 Dose(s) Oral once PRN  enalapril 10 milliGRAM(s) Oral daily  famotidine    Tablet 20 milliGRAM(s) Oral daily  furosemide    Tablet 20 milliGRAM(s) Oral two times a day  glucagon  Injectable 1 milliGRAM(s) IntraMuscular once PRN  guaiFENesin   Syrup  (Sugar-Free) 100 milliGRAM(s) Oral every 6 hours PRN  insulin glargine Injectable (LANTUS) 8 Unit(s) SubCutaneous at bedtime  insulin lispro (HumaLOG) corrective regimen sliding scale   SubCutaneous three times a day before meals  insulin lispro (HumaLOG) corrective regimen sliding scale   SubCutaneous at bedtime  loratadine 10 milliGRAM(s) Oral daily  tiotropium 18 MICROgram(s) Capsule 1 Capsule(s) Inhalation daily      Physical Exam:    Vitals:  T(C): 37 (17 @ 04:49), Max: 37 (17 @ 04:49)  HR: 88 (17 @ 04:49) (84 - 103)  BP: 140/71 (17 @ 04:49) (111/68 - 140/71)  RR: 18 (17 @ 04:49) (18 - 20)  SpO2: 97% (17 @ 04:49) (96% - 98%)    Tele: SR vpaced vs aflutter vpaced.     Daily     Daily Weight in k.7 (01 Dec 2017 04:49) <- 80.4 kg     I&O's Summary    2017 07:  -  01 Dec 2017 07:00  --------------------------------------------------------  IN: 460 mL / OUT: 1200 mL / NET: -740 mL      General: Sitting at edge of bed. Comfortable in no acute distress.   HEENT: EOM intact. Conjunctiva normal.  Neck: Neck supple. JVP mildly elevated.   Chest: Clear to auscultation bilaterally  CV: RRR, S1S2, no m/r/g, +1 BLE edema to mid calves.  Abdomen: Obese, soft, non-distended, non-tender, + BS  Skin: No rashes or skin breakdown  Neurology: Alert and oriented times three. Sensation intact  Psych: Affect normal    Labs:                           12.8   8.6   )-----------( 160      ( 01 Dec 2017 09:34 )             38.8                        13.3   7.2   )-----------( 166      ( 2017 08:50 )             40.4         141  |  100  |  15  ----------------------------<  176<H>  3.8   |  28  |  1.09    Ca    9.5      01 Dec 2017 09:33  Phos  3.4       Mg     1.6         142  |  100  |  15  ----------------------------<  116<H>  3.8   |  30  |  0.90    Ca    9.2      2017 08:49  Mg     1.6           CARDIAC MARKERS ( 2017 14:35 )  x     / <0.01 ng/mL / 148 U/L / x     / 3.7 ng/mL      Serum Pro-Brain Natriuretic Peptide: 218 pg/mL ( @ 08:26)    Lactate, Blood: 1.5 mmol/L ( @ 16:34)  Lactate, Blood: 2.2 mmol/L ( @ 13:21)  Lactate, Blood: 2.4 mmol/L ( @ 14:35)

## 2017-12-01 NOTE — PROVIDER CONTACT NOTE (OTHER) - SITUATION
Patient with c/o palpitations-HR in 150'S for 4 mins- pacemaker did not fire
22 seconds of ATach, rate of 140-150s
Notified provider, Pt had 8 beats of wide complex, first time occurrence, has been SR/V paced on tele this shift
notified provider pt AICD site with some redness at inferior portion.  s/p AICD generator change
Atach on tele up to 150s

## 2017-12-01 NOTE — DISCHARGE NOTE ADULT - MEDICATION SUMMARY - MEDICATIONS TO CHANGE
I will SWITCH the dose or number of times a day I take the medications listed below when I get home from the hospital:  None I will SWITCH the dose or number of times a day I take the medications listed below when I get home from the hospital:    enalapril 10 mg oral tablet  -- 1 tab(s) by mouth once a day

## 2017-12-01 NOTE — PROGRESS NOTE ADULT - SUBJECTIVE AND OBJECTIVE BOX
Authored by Bert Frederick, DO: Beeper#[phone]    ANDRE BARKER  72y  Female      Patient is a 72y old  Female who presents with a chief complaint of Chest fluttering (29 Nov 2017 13:45)      INTERVAL HPI/OVERNIGHT EVENTS: s/p generator change of BiV, no complications.  Patient states wheeze in AM, improevd with nebulizer.  No other complaints .      Vital Signs Last 24 Hrs  T(C): 37 (01 Dec 2017 04:49), Max: 37 (01 Dec 2017 04:49)  T(F): 98.6 (01 Dec 2017 04:49), Max: 98.6 (01 Dec 2017 04:49)  HR: 88 (01 Dec 2017 04:49) (84 - 103)  BP: 140/71 (01 Dec 2017 04:49) (111/68 - 140/71)  BP(mean): --  RR: 18 (01 Dec 2017 04:49) (18 - 20)  SpO2: 97% (01 Dec 2017 04:49) (96% - 98%)    PHYSICAL EXAM:  GENERAL: NAD  HEENT: Anicteric sclera  CARDS: +S1S2. rrr  RESP: expiratory wheeze b/l.  No rhonchi, rales.  ABD: obese.  NT/ND. +BS  EXT: +1 pitting edema b/l.  no cyanosis.   Tele: intermittent a-flutter to 140-150's	      Consultant(s) Notes Reviewed:  [x ] YES  [ ] NO  Care Discussed with Consultants/Other Providers [ x] YES  [ ] NO: EP NP    LABS:                                 12.8   8.6   )-----------( 160      ( 01 Dec 2017 09:34 )             38.8            12-01    141  |  100  |  15  ----------------------------<  176<H>  3.8   |  28  |  1.09    Ca    9.5      01 Dec 2017 09:33  Phos  3.4     12-01  Mg     1.6     12-01    CAPILLARY BLOOD GLUCOSE      POCT Blood Glucose.: 260 mg/dL (01 Dec 2017 11:05)  POCT Blood Glucose.: 165 mg/dL (01 Dec 2017 07:15)  POCT Blood Glucose.: 191 mg/dL (30 Nov 2017 22:29)  POCT Blood Glucose.: 127 mg/dL (30 Nov 2017 20:33)        Urinalysis Basic - ( 29 Nov 2017 08:26 )    RADIOLOGY & ADDITIONAL TESTS:    Imaging Personally Reviewed:  [ ] YES  [ ] NO

## 2017-12-01 NOTE — DIETITIAN INITIAL EVALUATION ADULT. - NS AS NUTRI INTERV ED CONTENT3
Nutrition relationship to health/disease/Recommended modifications/Purpose of the nutrition education/1) Educated pt on general healthful diet with reduced meal portions to promote desirable gradual weight loss, exercise as toelrated/per MD discretion, low sodium diet (reducing intake of fast foods) and ocntinuing reducing sodium intake, sodium intake recommendations, compliance with fluid restriction. Heart-healthy nutrition therapy handout provided.

## 2017-12-01 NOTE — DISCHARGE NOTE ADULT - SECONDARY DIAGNOSIS.
Severe asthma without complication, unspecified whether persistent Systolic heart failure, chronic Uncontrolled type 2 diabetes mellitus without complication, with long-term current use of insulin

## 2017-12-01 NOTE — DIETITIAN INITIAL EVALUATION ADULT. - NS AS NUTRI INTERV COLLABORAT3
Collaboration with other providers/Discussed with NP. Encourage compliance with lasix /medications and fluid restriction.

## 2017-12-01 NOTE — DIETITIAN INITIAL EVALUATION ADULT. - PROBLEM SELECTOR PLAN 3
CKMB elevated likely due to demand ischemia from SVT vs. less likely ACS  - f/u repeat cardiac enzyme panel to trend troponin

## 2017-12-01 NOTE — DISCHARGE NOTE ADULT - MEDICATION SUMMARY - MEDICATIONS TO STOP TAKING
I will STOP taking the medications listed below when I get home from the hospital:    potassium chloride 20 mEq/15 mL oral liquid  -- 5 milliliter(s) by mouth once a day I will STOP taking the medications listed below when I get home from the hospital:    potassium chloride 20 mEq/15 mL oral liquid  -- 5 milliliter(s) by mouth once a day    Salonpas 0.025%-1.25% topical film  -- Apply on skin to affected area , As Needed    Diabetic Tuss 100 mg/5 mL oral liquid  -- 10 milliliter(s) by mouth once a day

## 2017-12-01 NOTE — DIETITIAN INITIAL EVALUATION ADULT. - NS AS NUTRI INTERV MEALS SNACK
General/healthful diet/1) Continue consistent CHO evening snack diet to promote blood glucose control; DASH/TLC for heart health with fluid restriction per medical team to promote fluid control. Will add bran cereal for constipation consider bowel regimen as needed./Fluid - modified diet/Mineral - modified diet/Carbohydrate - modified diet

## 2017-12-01 NOTE — DIETITIAN INITIAL EVALUATION ADULT. - OTHER INFO
Pt seen for consult: HgA1c: 8.2 kG/m2. Pt current reports "very good" po intake/appetite at this time Pt seen for consult: HgA1c: 8.2 kG/m2. Pt current reports "very good" po intake/appetite at this time, denies significant weight changes (other than fluctuations r/t fluid retention/diuresis) with  pounds consistent with current wt 186.7 pounds. Pt denies N/V/diarrhea, admits to constipation x 2 days, requests adding Raisin Bran to her breakfast to address. Pt denies difficulty chewing/swallowing, denies food allergy or intolerance. In terms of DM control pt admits to non-compliance with Lantus; will take Lantus only if her fingersticks prior to bed are 150 or more, and uses Lantus 8 units to treat hyperglycemia during the day. Pt does not currently use Humalog. Pt's fingersticks in the morning often within goal range ; throughout the day pt admits to variable readings 100-300. She is unaware of the relationship between meals and post-prandial glucose. Pt also admits to taking lasix only "as needed" which per pt means when she feels her legs swelling. Has poor exercise tolerance so pt "doesn't exercise at all." Pt does not actively try to lose wt but has been improving her diet over the past 2-3 months due to frequent hospitalizations and motivation to change. Pt able to teach-back 3 foods with CHO and is able to specify small portion sizes. Pt is hesitant to comply with insulin regimen and lasix as she firmly believes she can get low blood sugar with her Lantus dose and needs to treat her hyperglycemia with Lantus as needed. Also c/o cramping with daily lasix use. Pt amenable to extensive T2DM/diet and heart healthy diet education at this time.

## 2017-12-01 NOTE — DIETITIAN INITIAL EVALUATION ADULT. - NS AS NUTRI INTERV ED CONTENT
Recommended modifications/Nutrition relationship to health/disease/Purpose of the nutrition education/1) Extensive T2DM diet/management education provided to pt. Discussed importance of blood glucose control with pt; SMBG, compliance with medications, goal fingerstick ranges, consistent CHO diet recommendations, appropriate CHO portion sizes, recommended CHO servings per meals/snacks, evening snack and preventing meal skipping to prevent hypoglycemia, exercise a tolerated/per MD discretion, moderate wt loss with reduced portion sizes, and problem solving with appropriate action for hypo and hyperglycemia. T2DM nutrition therapy handout provided for review.

## 2017-12-01 NOTE — DISCHARGE NOTE ADULT - PLAN OF CARE
follow up with PMD for INR check. You had fluttering, undiagnosed 2/2 fialed battery, with generator changed on this admission.  -on telemetry you were found to be in atrial fibrillation.  -you were started on anticoagulation.  -please follow up with your primary care phsyician for coumadin checks and adjustment.  -follow up with EP (Dr. Duffy) in 2 weeks at appointment time for follow up and discusison about treatment for atrial fibrillation. conitnue curernt medicatin, avoid triggers Continue current mediactions  -advised to switch soaps, showers to unscented soaps and showers.  -follow up with pulmonologist low salt diet, medication compliance Please be compliant with your diuretics and take everyday.  -follow up with Dr. Hobson in 2 weeks take your lantus everynight Please take your lantus as instructed everynight as it is a baseline insulin and needs to be taken everynight and not as needed before meals.  -please continue your oral medications.  -please stick to a low carbohydrate diet.  -follow up with your PMD follow up with electrophysiology in 1 week You had fluttering, undiagnosed 2/2 failed battery, with generator changed on this admission.  -on telemetry you were found to be in atrial fibrillation.  -you were started on anticoagulation with eliquis.  -follow up with EP (Dr. Duffy) for follow up and discussion about treatment for atrial fibrillation,  appointment is scheduled on 12/8/17 at 10:20am. . continue current medications, avoid triggers Please be compliant with your diuretics, Lasix and Spironolactone.   -follow up with Dr. Hobson in 2 weeks take your Lantus every night Please take your lantus as instructed everynight as it is a baseline insulin and needs to be taken every night and not as needed before meals.  -please continue your oral medications.  -please stick to a low carbohydrate diet.  -follow up with your PMD

## 2017-12-01 NOTE — PROCEDURE NOTE - NSPROCNAME_GEN_A_CORE
CRT-D (Cardiac Resynchronization Therapy with Defibrillation Capabilities) Interrogation Note
ICD Interrogation Note

## 2017-12-01 NOTE — DISCHARGE NOTE ADULT - HOSPITAL COURSE
72f hx obesity, systolic CHF s/p AICD, HTN, HLD, DM2, diverticulosis, gout, asthma with frequent exacerbations and prior intubations, recent hospital admission 2 weeks ago for asthma exacerbation presenting today with several episodes of 'chest fluttering' since this morning aroudn 430am. No prior episodes. Palpitations came on suddenly, no chest pain, shortness of breath of lightheadedness. Localized to substernal region without radiation. No wheezing or cough. Palpitations seem to come and go, no discernible trigger. At present, notes fluttering has  down but patient now reports R shoulder pain, which came on and remitted spontaneously, followed by L shoulder + arm 'annoyance' and not kristian pain, squeezing in nature. No preceding injury/truama/overuse.     Patient admitted, seen by EP, unable to capture rhythm of event as battery stopped on 10/1, nothing captured since then.  Generator changed 17, no complication.  Patient found to be in atrial fibrillation, paroxysmal on telemetry.  EP apprciated, started on coumadin as patient did not want DOAC 2/2 commercials.  To follow up as outpatient.  Patient followed by heart failure, spironolactone addded.  Diabetes education also provided as patient inproprerly taking lantus.  Patient stable for discharge with close follow up and coumadin check no Monday. 72f hx obesity, systolic CHF s/p AICD, HTN, HLD, DM2, diverticulosis, gout, asthma with frequent exacerbations and prior intubations, recent hospital admission 2 weeks ago for asthma exacerbation presenting today with several episodes of 'chest fluttering,' found to have new onset atrial flutter. Device unable to capture rhythm of event as battery stopped on 10/1, nothing captured since then.  Generator changed 11/30/17, no complication.  Patient converted back to sinus rhythm. Unable to use beta-blocker due to severe asthma, has reported allergy to Digoxin. EP input appreciated. Pt agreed to anticoagulation with Eliquis, side effects and risks of bleeding were discussed with patient at length. She will f/up with EP for possible outpatient aflutter ablation. Pt also with chronic systolic heart failure, started on Lasix and Spironolactone. Diabetes education also provided as patient improperly taking Lantus.  Patient stable for discharge. 72f hx obesity, systolic CHF s/p AICD, HTN, HLD, DM2, diverticulosis, gout, asthma with frequent exacerbations and prior intubations, recent hospital admission 2 weeks ago for asthma exacerbation presenting today with several episodes of 'chest fluttering,' found to have new onset atrial flutter. Device unable to capture rhythm of event as battery stopped on 10/1, nothing captured since then.  Generator changed 11/30/17, no complication.  Patient converted back to sinus rhythm. Unable to use beta-blocker due to severe asthma, has reported allergy to Digoxin. EP input appreciated. Pt agreed to anticoagulation with Eliquis, side effects and risks of bleeding were discussed with patient at length. She will f/up with EP for possible outpatient aflutter ablation. Pt also with chronic systolic heart failure, started on Lasix and Spironolactone. Enalapril increased to twice a day. Diabetes education also provided as patient improperly taking Lantus.  Patient stable for discharge.

## 2017-12-01 NOTE — PROVIDER CONTACT NOTE (OTHER) - BACKGROUND
pt came up from ED at 1800 today with plan of ICD generator change today. hx of chf, dm2, htn, hld, asthma with fq exacerbations
Patient admitted for palpitations
Patient admitted with palpitations. Plan for AICD generator change.
Pt admitted for palpitations, Plan is for ICD battery replacement
admitted with palpitations

## 2017-12-01 NOTE — DISCHARGE NOTE ADULT - CARE PROVIDERS DIRECT ADDRESSES
,DirectAddress_Unknown ,DirectAddress_Unknown,[email or web address]

## 2017-12-01 NOTE — DIETITIAN INITIAL EVALUATION ADULT. - NS AS NUTRI INTERV COLLABORAT
Collaboration with other providers/Discussed with NP; for possible insulin adjustment/change and further education on medication compliance

## 2017-12-01 NOTE — DIETITIAN INITIAL EVALUATION ADULT. - ADHERENCE
Pt reports improving her diet bu cutting out high sodium food items (no longer eating cardenas, sausage, cold cuts or soups) and monitors her carbohydrate intake daily via portion sizes (eats 1/2 bagel, small scoops of mashed potato). Pt avoid sugar-sweetened beverages unless treating hypoglycemia./good Pt reports improving her diet by cutting out high sodium food items (no longer eating cardenas, sausage, cold cuts or soups) and monitors her carbohydrate intake daily via portion sizes (eats 1/2 bagel, small scoops of mashed potato). Pt avoid sugar-sweetened beverages unless treating hypoglycemia./fair

## 2017-12-01 NOTE — DISCHARGE NOTE ADULT - CARE PROVIDER_API CALL
Souleymane Chaudhary  Phone: (   )    -  Fax: (   )    - Souleymane Chaudhary  Phone: (   )    -  Fax: (   )    -    Jarred Duffy (MD), Internal Medicine  [address] [place] [address] [id number]  Phone: [phone]  Fax: [phone]

## 2017-12-01 NOTE — DIETITIAN INITIAL EVALUATION ADULT. - ENERGY NEEDS
ht: 62 inches. wt: 186.7 pounds (current, standing, +2 B/L leg edema). BMI: 34.1 kG/m2. UBW: 187 pounds x 3 months ago. IBW: 110 pounds +/- 10%. %IBW: 170%  Other pertinent objective information: 72 year old female pt with PMH obesity, systolic CHF s/p AICD, HTN, HLD, DM2, COPD, diverticulosis, gout, asthma with frequent exacerbations and prior intubations, presents 11/29 with several episodes of 'chest fluttering' found to have AICD EOL requiring battery change 11/30. Lasix for fluid overload. No pressure ulcers noted.

## 2017-12-01 NOTE — DIETITIAN INITIAL EVALUATION ADULT. - SIGNS/SYMPTOMS
Pt reports taking Lantus "as needed"; unaware of CHO counting/relationship to post prandial glucose BMI: 34.1 kG/m2; +2 B/L leg edema

## 2017-12-01 NOTE — PROGRESS NOTE ADULT - SUBJECTIVE AND OBJECTIVE BOX
INTERVAL HPI/OVERNIGHT EVENTS: s/p BiV ICD gen change 11/30; no complaints      MEDICATIONS  (STANDING):  ALBUTerol    90 MICROgram(s) HFA Inhaler 2 Puff(s) Inhalation every 6 hours  aspirin enteric coated 81 milliGRAM(s) Oral daily  atorvastatin 20 milliGRAM(s) Oral at bedtime  buDESOnide 160 MICROgram(s)/formoterol 4.5 MICROgram(s) Inhaler 2 Puff(s) Inhalation two times a day  dextrose 5%. 1000 milliLiter(s) (50 mL/Hr) IV Continuous <Continuous>  dextrose 50% Injectable 12.5 Gram(s) IV Push once  dextrose 50% Injectable 25 Gram(s) IV Push once  dextrose 50% Injectable 25 Gram(s) IV Push once  docusate sodium 100 milliGRAM(s) Oral three times a day  enalapril 10 milliGRAM(s) Oral daily  famotidine    Tablet 20 milliGRAM(s) Oral daily  furosemide    Tablet 20 milliGRAM(s) Oral two times a day  insulin glargine Injectable (LANTUS) 8 Unit(s) SubCutaneous at bedtime  insulin lispro (HumaLOG) corrective regimen sliding scale   SubCutaneous three times a day before meals  insulin lispro (HumaLOG) corrective regimen sliding scale   SubCutaneous at bedtime  loratadine 10 milliGRAM(s) Oral daily  senna 2 Tablet(s) Oral at bedtime  spironolactone 12.5 milliGRAM(s) Oral daily  tiotropium 18 MICROgram(s) Capsule 1 Capsule(s) Inhalation daily  warfarin 5 milliGRAM(s) Oral once    MEDICATIONS  (PRN):  acetaminophen   Tablet. 650 milliGRAM(s) Oral every 6 hours PRN Mild Pain (1 - 3)  dextrose Gel 1 Dose(s) Oral once PRN Blood Glucose LESS THAN 70 milliGRAM(s)/deciliter  glucagon  Injectable 1 milliGRAM(s) IntraMuscular once PRN Glucose LESS THAN 70 milligrams/deciliter  guaiFENesin   Syrup  (Sugar-Free) 100 milliGRAM(s) Oral every 6 hours PRN Cough      Allergies    Coreg (Other)  digoxin (Other; Short breath (Mild to Mod))  penicillins (Hives)  Solu-Medrol (Other)    ROS:  General: Pt denies recent weight loss/fever/chills    Neurological: denies numbness or  sensation loss    HEENT: denies visual changes, no hearing loss, denies sore throat    Cardiovascular: denies chest pain/palpitations/leg edema    Respiratory and Thorax: denies SOB/cough/wheezing    Gastrointestinal: denies abdominal pain/diarrhea/constipation/bloody stool    Genitourinary: denies urinary frequency/urgency/ dysuria    Musculoskeletal: denies joint pain or swelling, denies restricted motion    Skin: denies rashes/sores    Endocrine: denies heat or cold intolerance/excessive thirst    Hematologic: denies abnormal bleeding  	  Vital Signs Last 24 Hrs  T(C): 36.2 (01 Dec 2017 14:11), Max: 37 (01 Dec 2017 04:49)  T(F): 97.2 (01 Dec 2017 14:11), Max: 98.6 (01 Dec 2017 04:49)  HR: 88 (01 Dec 2017 14:11) (84 - 103)  BP: 123/77 (01 Dec 2017 14:11) (115/78 - 140/71)  RR: 19 (01 Dec 2017 14:11) (18 - 20)  SpO2: 98% (01 Dec 2017 14:11) (97% - 98%)      Physical Exam:    Constitutional: NAD, OOB to chair     Neurological: Alert & Oriented x 3    HEENT: Neck supple    Respiratory: CTA B/L, No wheezing/crackles/rhonchi    Cardiovascular: (+) S1 & S2, RRR    Gastrointestinal: soft, NT, nondistended, (+) BS    Genitourinary: non distended bladder, voiding freely    Extremities: No clubbing, No cyanosis, (+) bilateral 1+ LE edema    Skin: Left infraclavicular BiV ICD surgical wound site clean, dry, and dermabond dressing intact, no hematoma.       LABS:                        12.8   8.6   )-----------( 160      ( 01 Dec 2017 09:34 )             38.8     12-01    141  |  100  |  15  ----------------------------<  176<H>  3.8   |  28  |  1.09    Ca    9.5      01 Dec 2017 09:33  Phos  3.4     12-01  Mg     1.6     12-01      TELE: A sense/ V pace at  bpm, intermittent paroxysmal Aflutter

## 2017-12-01 NOTE — DISCHARGE NOTE ADULT - CARE PLAN
Principal Discharge DX:	Atrial fibrillation and flutter  Goal:	follow up with PMD for INR check.  Instructions for follow-up, activity and diet:	You had fluttering, undiagnosed 2/2 fialed battery, with generator changed on this admission.  -on telemetry you were found to be in atrial fibrillation.  -you were started on anticoagulation.  -please follow up with your primary care phsyician for coumadin checks and adjustment.  -follow up with EP (Dr. Duffy) in 2 weeks at appointment time for follow up and discusison about treatment for atrial fibrillation.  Secondary Diagnosis:	Severe asthma without complication, unspecified whether persistent  Goal:	conitnue curernt medicatin, avoid triggers  Instructions for follow-up, activity and diet:	Continue current mediactions  -advised to switch soaps, showers to unscented soaps and showers.  -follow up with pulmonologist  Secondary Diagnosis:	Systolic heart failure, chronic  Goal:	low salt diet, medication compliance  Instructions for follow-up, activity and diet:	Please be compliant with your diuretics and take everyday.  -follow up with Dr. Hobson in 2 weeks  Secondary Diagnosis:	Uncontrolled type 2 diabetes mellitus without complication, with long-term current use of insulin  Goal:	take your lantus everynight  Instructions for follow-up, activity and diet:	Please take your lantus as instructed everynight as it is a baseline insulin and needs to be taken everynight and not as needed before meals.  -please continue your oral medications.  -please stick to a low carbohydrate diet.  -follow up with your PMD Principal Discharge DX:	Atrial fibrillation and flutter  Goal:	follow up with electrophysiology in 1 week  Instructions for follow-up, activity and diet:	You had fluttering, undiagnosed 2/2 failed battery, with generator changed on this admission.  -on telemetry you were found to be in atrial fibrillation.  -you were started on anticoagulation with eliquis.  -follow up with EP (Dr. Duffy) for follow up and discussion about treatment for atrial fibrillation,  appointment is scheduled on 12/8/17 at 10:20am. .  Secondary Diagnosis:	Severe asthma without complication, unspecified whether persistent  Goal:	continue current medications, avoid triggers  Instructions for follow-up, activity and diet:	Continue current mediactions  -advised to switch soaps, showers to unscented soaps and showers.  -follow up with pulmonologist  Secondary Diagnosis:	Systolic heart failure, chronic  Goal:	low salt diet, medication compliance  Instructions for follow-up, activity and diet:	Please be compliant with your diuretics, Lasix and Spironolactone.   -follow up with Dr. Hobson in 2 weeks  Secondary Diagnosis:	Uncontrolled type 2 diabetes mellitus without complication, with long-term current use of insulin  Goal:	take your Lantus every night  Instructions for follow-up, activity and diet:	Please take your lantus as instructed everynight as it is a baseline insulin and needs to be taken every night and not as needed before meals.  -please continue your oral medications.  -please stick to a low carbohydrate diet.  -follow up with your PMD

## 2017-12-01 NOTE — PROGRESS NOTE ADULT - PROBLEM SELECTOR PLAN 7
-wheezing on exam  -continue with current regimen., albuterol ATC today.  -discussed avoidance of possible triggers. PEr patient events after baths or shower, uses scented lotions, soaps, shampoo.  Educated on unscented agents to avoid triggers

## 2017-12-01 NOTE — PROVIDER CONTACT NOTE (OTHER) - REASON
22 seconds of ATach, rate of 140-150s
AICD site with redness
Pt had 8 beats of wide complex
Atach on tele up to 150s
Patient with c/o palpitations-HR in 150'S for 4 mins- pacemaker did not fire

## 2017-12-01 NOTE — DIETITIAN INITIAL EVALUATION ADULT. - ORAL INTAKE PTA
good/Pt reports good po intake PTA; typical breakfast intake includes raisin bran cereal with milk or oatmeal or 1/2 bagel with 1-2 hard boiled eggs and decaf tea; lunch and dinner usually similar either a balanced meal prepared by pt or daughter baked chicken, vegetable, and mashed potato. Pt admits to getting [employer] every so often. Pt drinks mostly water throughout the day, only uses juice to treat hypoglycemia. DM medication taken PTA: Metformin, Lantus. Pt denies taking vitamin/mineral/herbal supplements PTA.

## 2017-12-01 NOTE — DIETITIAN INITIAL EVALUATION ADULT. - NUTRITION INTERVENTION
Nutrition Education/Collaboration and Referral of Nutrition Care/Meals and Snack Meals and Snack/Collaboration and Referral of Nutrition Care/Nutrition Education

## 2017-12-01 NOTE — PROVIDER CONTACT NOTE (OTHER) - ASSESSMENT
VSS - pt states she "felt a flutter but it passed" - denies SOB, chest pain,
Patient A&Ox4, VSS-as documented. Patient without chest pain or pressure at this time. Pt c/o palpitations  without radiation. No s/s of distress at this time.
LX=426/88, HR=83, temp=98.3 F, respirations=18, O2 sat=97%, pt is asymptomatic and denies chest pain and palpitations
Vital signs stable, patient states some "fluttering" but passed with time.
no signs of distress.  afebrile.  c/o pain upon palpation only.

## 2017-12-01 NOTE — DIETITIAN INITIAL EVALUATION ADULT. - PROBLEM SELECTOR PLAN 1
Tele monitor in ED captured 1 minute burst of SVT HR to 150's without firing, suspect palpitations are related to SVT in the setting of improperly functioning ICD.  - f/u device interrogation  - continue telemetry monitoring  - f/u EP recommendations

## 2017-12-01 NOTE — PROCEDURE NOTE - ADDITIONAL PROCEDURE DETAILS
CRT-D interrogated for pt c/o ' feeling fluttering of heart '.  -Stored data showed multiple episode of NSVT.  -Had episode of VT on 4/26/17 & 6/8/17, each episode treated with ATP.  -battery reached RRT on Oct/1/17.  -further interrogation deferred d/t RRT.
1) Indication for interrogation: r/o Aflutter on tele  2) Tele: A sense/ BiV pace <-->paroxysmal AFlutter  3) Measured data WNL, NL BiV ICD function, Pt is not PM dependent  4) Stored data revealed no events for view, this is due to paroxysmal Aflutter noted on Tele does not meet criteria to store (ie: each episode is too short to store).   5) See EP note for further EP recommendation     [id number]

## 2017-12-01 NOTE — DIETITIAN INITIAL EVALUATION ADULT. - ETIOLOGY
pt non compliant with insulin regimen PTA; estimating CHO intake without post-prandial glucose kcal intake in excess of needs; fluid retention

## 2017-12-02 VITALS
RESPIRATION RATE: 19 BRPM | DIASTOLIC BLOOD PRESSURE: 74 MMHG | OXYGEN SATURATION: 98 % | SYSTOLIC BLOOD PRESSURE: 109 MMHG | TEMPERATURE: 98 F | HEART RATE: 98 BPM

## 2017-12-02 LAB
ANION GAP SERPL CALC-SCNC: 16 MMOL/L — SIGNIFICANT CHANGE UP (ref 5–17)
BUN SERPL-MCNC: 16 MG/DL — SIGNIFICANT CHANGE UP (ref 7–23)
CALCIUM SERPL-MCNC: 9.9 MG/DL — SIGNIFICANT CHANGE UP (ref 8.4–10.5)
CHLORIDE SERPL-SCNC: 98 MMOL/L — SIGNIFICANT CHANGE UP (ref 96–108)
CO2 SERPL-SCNC: 25 MMOL/L — SIGNIFICANT CHANGE UP (ref 22–31)
CREAT SERPL-MCNC: 1.08 MG/DL — SIGNIFICANT CHANGE UP (ref 0.5–1.3)
GLUCOSE BLDC GLUCOMTR-MCNC: 121 MG/DL — HIGH (ref 70–99)
GLUCOSE BLDC GLUCOMTR-MCNC: 236 MG/DL — HIGH (ref 70–99)
GLUCOSE SERPL-MCNC: 246 MG/DL — HIGH (ref 70–99)
HCT VFR BLD CALC: 40.4 % — SIGNIFICANT CHANGE UP (ref 34.5–45)
HGB BLD-MCNC: 13.4 G/DL — SIGNIFICANT CHANGE UP (ref 11.5–15.5)
MAGNESIUM SERPL-MCNC: 1.7 MG/DL — SIGNIFICANT CHANGE UP (ref 1.6–2.6)
MCHC RBC-ENTMCNC: 31.9 PG — SIGNIFICANT CHANGE UP (ref 27–34)
MCHC RBC-ENTMCNC: 33.2 GM/DL — SIGNIFICANT CHANGE UP (ref 32–36)
MCV RBC AUTO: 96.2 FL — SIGNIFICANT CHANGE UP (ref 80–100)
PLATELET # BLD AUTO: 208 K/UL — SIGNIFICANT CHANGE UP (ref 150–400)
POTASSIUM SERPL-MCNC: 4.1 MMOL/L — SIGNIFICANT CHANGE UP (ref 3.5–5.3)
POTASSIUM SERPL-SCNC: 4.1 MMOL/L — SIGNIFICANT CHANGE UP (ref 3.5–5.3)
RBC # BLD: 4.2 M/UL — SIGNIFICANT CHANGE UP (ref 3.8–5.2)
RBC # FLD: 14.3 % — SIGNIFICANT CHANGE UP (ref 10.3–14.5)
SODIUM SERPL-SCNC: 139 MMOL/L — SIGNIFICANT CHANGE UP (ref 135–145)
WBC # BLD: 9.73 K/UL — SIGNIFICANT CHANGE UP (ref 3.8–10.5)
WBC # FLD AUTO: 9.73 K/UL — SIGNIFICANT CHANGE UP (ref 3.8–10.5)

## 2017-12-02 PROCEDURE — 99233 SBSQ HOSP IP/OBS HIGH 50: CPT

## 2017-12-02 PROCEDURE — 99239 HOSP IP/OBS DSCHRG MGMT >30: CPT

## 2017-12-02 RX ORDER — FUROSEMIDE 40 MG
1 TABLET ORAL
Qty: 0 | Refills: 0 | COMMUNITY
Start: 2017-12-02

## 2017-12-02 RX ORDER — APIXABAN 2.5 MG/1
1 TABLET, FILM COATED ORAL
Qty: 60 | Refills: 0 | OUTPATIENT
Start: 2017-12-02 | End: 2018-01-01

## 2017-12-02 RX ORDER — APIXABAN 2.5 MG/1
1 TABLET, FILM COATED ORAL
Qty: 0 | Refills: 0 | COMMUNITY
Start: 2017-12-02

## 2017-12-02 RX ORDER — FUROSEMIDE 40 MG
1 TABLET ORAL
Qty: 30 | Refills: 0 | OUTPATIENT
Start: 2017-12-02 | End: 2018-01-01

## 2017-12-02 RX ORDER — SENNA PLUS 8.6 MG/1
2 TABLET ORAL
Qty: 60 | Refills: 0 | OUTPATIENT
Start: 2017-12-02 | End: 2018-01-01

## 2017-12-02 RX ORDER — BUDESONIDE AND FORMOTEROL FUMARATE DIHYDRATE 160; 4.5 UG/1; UG/1
2 AEROSOL RESPIRATORY (INHALATION)
Qty: 0 | Refills: 0 | COMMUNITY
Start: 2017-12-02

## 2017-12-02 RX ORDER — METFORMIN HYDROCHLORIDE 850 MG/1
1 TABLET ORAL
Qty: 0 | Refills: 0 | COMMUNITY
Start: 2017-12-02

## 2017-12-02 RX ORDER — MAGNESIUM OXIDE 400 MG ORAL TABLET 241.3 MG
400 TABLET ORAL ONCE
Qty: 0 | Refills: 0 | Status: COMPLETED | OUTPATIENT
Start: 2017-12-02 | End: 2017-12-02

## 2017-12-02 RX ORDER — DOCUSATE SODIUM 100 MG
1 CAPSULE ORAL
Qty: 0 | Refills: 0 | COMMUNITY
Start: 2017-12-02

## 2017-12-02 RX ORDER — DOCUSATE SODIUM 100 MG
1 CAPSULE ORAL
Qty: 90 | Refills: 0 | OUTPATIENT
Start: 2017-12-02 | End: 2018-01-01

## 2017-12-02 RX ORDER — SPIRONOLACTONE 25 MG/1
0.5 TABLET, FILM COATED ORAL
Qty: 15 | Refills: 0 | OUTPATIENT
Start: 2017-12-02 | End: 2018-01-01

## 2017-12-02 RX ORDER — METFORMIN HYDROCHLORIDE 850 MG/1
500 TABLET ORAL DAILY
Qty: 0 | Refills: 0 | Status: DISCONTINUED | OUTPATIENT
Start: 2017-12-02 | End: 2017-12-02

## 2017-12-02 RX ORDER — SENNA PLUS 8.6 MG/1
2 TABLET ORAL
Qty: 0 | Refills: 0 | COMMUNITY
Start: 2017-12-02

## 2017-12-02 RX ORDER — BUDESONIDE AND FORMOTEROL FUMARATE DIHYDRATE 160; 4.5 UG/1; UG/1
2 AEROSOL RESPIRATORY (INHALATION)
Qty: 1 | Refills: 0 | OUTPATIENT
Start: 2017-12-02

## 2017-12-02 RX ADMIN — Medication 20 MILLIGRAM(S): at 05:31

## 2017-12-02 RX ADMIN — Medication 650 MILLIGRAM(S): at 09:30

## 2017-12-02 RX ADMIN — BUDESONIDE AND FORMOTEROL FUMARATE DIHYDRATE 2 PUFF(S): 160; 4.5 AEROSOL RESPIRATORY (INHALATION) at 05:31

## 2017-12-02 RX ADMIN — MAGNESIUM OXIDE 400 MG ORAL TABLET 400 MILLIGRAM(S): 241.3 TABLET ORAL at 15:38

## 2017-12-02 RX ADMIN — Medication 650 MILLIGRAM(S): at 08:26

## 2017-12-02 RX ADMIN — ALBUTEROL 2 PUFF(S): 90 AEROSOL, METERED ORAL at 05:32

## 2017-12-02 RX ADMIN — SPIRONOLACTONE 12.5 MILLIGRAM(S): 25 TABLET, FILM COATED ORAL at 05:31

## 2017-12-02 RX ADMIN — LORATADINE 10 MILLIGRAM(S): 10 TABLET ORAL at 11:27

## 2017-12-02 RX ADMIN — APIXABAN 5 MILLIGRAM(S): 2.5 TABLET, FILM COATED ORAL at 05:31

## 2017-12-02 RX ADMIN — Medication 2: at 11:33

## 2017-12-02 RX ADMIN — FAMOTIDINE 20 MILLIGRAM(S): 10 INJECTION INTRAVENOUS at 11:27

## 2017-12-02 RX ADMIN — Medication 10 MILLIGRAM(S): at 05:31

## 2017-12-02 RX ADMIN — Medication 81 MILLIGRAM(S): at 11:27

## 2017-12-02 RX ADMIN — ALBUTEROL 2 PUFF(S): 90 AEROSOL, METERED ORAL at 11:35

## 2017-12-02 RX ADMIN — TIOTROPIUM BROMIDE 1 CAPSULE(S): 18 CAPSULE ORAL; RESPIRATORY (INHALATION) at 12:06

## 2017-12-02 NOTE — PROGRESS NOTE ADULT - ATTENDING COMMENTS
71 y/o F w/ h/o HFrEF (prior EF 25% 2009, subsequently improved to mild on most recent TTE 4/17), s/p CRT-D 2008, HTN, HLD, DM2, diverticulosis, gout, asthma (prior intubation 1 year prior in setting of flu/pneumonia) req trach s/p decannulation who was admitted with chest fluttering (reportedly had atach). Noted to have prior episode of VT in April & June 2017 which treated with ATP. Underwent generator replacement 11/30 for OLIVER. Noted to be overloaded yesterday; doesn't take lasix d/t concern of hypokalemia. Had paroxysmal aflutter overnight. On exam, improved JVD, RRR, no m/r/g, clear lungs, 1+ pitting edema b/l. Labs reviewed A1c 8.2,  (163 11/6/17). EKG sinus tach, BiV paced. TTE 4/17 with mild LV dysfunction with segmental wall motion abnl. Cath 12/09 normal coronaries. Overall stage C HF with possible HFrecEF with evidence of volume overload.   - continue lasix 20 mg PO bid; goal net out 1-2L; switch to lasix 20 mg PO daily on discharge   - add bailey 12.5 mg daily   - continue enalapril 10 mg daily   - not on BB d/t severe asthma; could try bisoprolol but defer for now  - atrial flutter; coumadin to be started; possible ablation in future   - standing weights daily  - please have diabetes educator see patient as she was taking lantus pre-meal and has poor understanding  - keep K>4, Mg>2  - repeat TTE (last 4/17)
Discharge home, d/c time 36 minutes

## 2017-12-02 NOTE — PROGRESS NOTE ADULT - PROVIDER SPECIALTY LIST ADULT
Electrophysiology
Electrophysiology
Heart Failure
Hospitalist
Electrophysiology

## 2017-12-02 NOTE — PROGRESS NOTE ADULT - PROBLEM SELECTOR PLAN 2
-Pt not candidate for beta blocker due to hx of severe asthma.  -No AAD at this point.  -Will consider VT ablation as outpatient if recurrent VT
-Pt not candidate for beta blocker due to hx of severe asthma.  -No AAD at this point.  -Will consider VT ablation as outpatient if recurrent VT
-c/w Tele  -recent VT needing ATP therapy.  -Pt not candidate for beta blocker due to hx of severe asthma  -Will d/w EP attending (Tati Kamara) for possible AAD.     [id number]
for generator change today.
s/p generator change, no complications
s/p generator change, no complications
- Recommend c/s to diabetic educator as patient has poor understanding of DM management/medications

## 2017-12-02 NOTE — PROGRESS NOTE ADULT - PROBLEM SELECTOR PLAN 3
-New onset paroxysmal atrial flutter; rate controlled   -Started on eliquis 5mg bid given [id number] score of 5.   -Pt not candidate for betablocker given hx of severe asthma nor Digoxin given hx of previous allergic reaction to Digoxin.   -Will consider Aflutter ablation as outpatient  -f/u Dr. Duffy in 1 week as scheduled on 12/8/17 at 10:20am.   -Clear from EP perspective for discharge    FINA Ojeda EP
-New onset paroxysmal atrial flutter; rate controlled   -Would recommend anticoagulation given [id number] score of 5.   -Pt not candidate for betablocker given hx of severe asthma nor Digoxin given hx of previous allergic reaction to Digoxin.   -Will consider Aflutter ablation as outpatient  -f/u Dr. Duffy in 1 week as scheduled on 12/8/17 at 10:20am.   -Clear from EP perspective for discharge planning.    [id number]
patient with contaminated UA, assymptomatic with +UCx  -this is contamination, not a UTI  -will not treat at this time.
patient with contaminated UA, assymptomatic with +UCx  -this is contamination, not a UTI  -will not treat at this time.
patient with contaminated UA, asymptomatic with +UCx  -this is contamination, not a UTI  -will not treat at this time.
- BP currently well controlled with BP goal < 140/90  - Continue current dose of enalapril

## 2017-12-02 NOTE — PROGRESS NOTE ADULT - PROBLEM SELECTOR PLAN 4
Contineu enalapril 10mg po daily
Contineu enalapril 10mg po daily
Continue enalapril 10mg po daily
- Continue to monitor on telemetry  - EP interrogated device, which did not capture pAT  - Recommend holding on anticoagulation at this time (including DVT ppx lovenox/ SC heparin) as patient had generator change 11/30 so as not to increase risk for pocket hematoma.  - Follow up discussion regarding OAC upon discharge with coumadin vs NOAC

## 2017-12-02 NOTE — PROGRESS NOTE ADULT - PROBLEM SELECTOR PLAN 1
-s/p BiV ICD (MDT) generator change on 11/30/17. Site c/d/i.   -Post procedure teaching enforced with pt.  -ICD booklet and Temp. ID card given to pt
-Keep pt NPO for BiV ICD generator change this afternoon.
-new onset Atrial Flutter   -anticoagulation with Eliquis   -cannot go on beta blocker 2/2 severe asthma  -possible outpatient Aflutter ablation   -f/up with EP
-patient with palpitations yesterday, unknown rhythm as patient's defibrillator had battery depletion did nto capture any events since 10/1.    -had VT in april and june  -for battery replacemetn today.  -cannot go on beta blocker for a-tach 2/2 severe asthma  -follow up EP recs for a-tach
-s/p BiV ICD (MDT) generator change on 11/30/17.   -Post procedure teaching enforced with pt.  -ICD booklet and Temp. ID card given to pt
-tele with a-flutter, which for patient would be NEW onset.  -discussed with EP, will interrogate device to confirm.  -if is A-flutter, patient would be candidate for anticoagulation, would prefer doac given relative non-compliance on diet, doubt would be therapeutic on coumadin.   -cannot go on beta blocker for a-tach 2/2 severe asthma  -follow up EP recs for a-flutter re: digoxin vs. cardioversion vs. observation.
- Continue current dose of lasix 20mg PO BID through tonight. 12/2 can decrease lasix to 20mg PO daily  - Recommend starting spironolactone 12.5 mg PO daily with first dose today  - Continue current dose of enalapril 10mg daily  - holding on initiation of BB at this time due to patients severe asthma and she remains volume overloaded  - Repeat TTE  - Daily standing weights  - Daily BMP, Mg. Electrolyte replacement to maintain K 4-4.5 and Mg 2-2.5  - Continue telemetry

## 2017-12-02 NOTE — PROGRESS NOTE ADULT - SUBJECTIVE AND OBJECTIVE BOX
Patient is a 72y old  Female who presents with a chief complaint of Chest fluttering (01 Dec 2017 14:50)      SUBJECTIVE / OVERNIGHT EVENTS: Pt feels well, no new complaints.     MEDICATIONS  (STANDING):  ALBUTerol    90 MICROgram(s) HFA Inhaler 2 Puff(s) Inhalation every 6 hours  apixaban 5 milliGRAM(s) Oral every 12 hours  aspirin enteric coated 81 milliGRAM(s) Oral daily  atorvastatin 20 milliGRAM(s) Oral at bedtime  buDESOnide 160 MICROgram(s)/formoterol 4.5 MICROgram(s) Inhaler 2 Puff(s) Inhalation two times a day  dextrose 5%. 1000 milliLiter(s) (50 mL/Hr) IV Continuous <Continuous>  dextrose 50% Injectable 12.5 Gram(s) IV Push once  dextrose 50% Injectable 25 Gram(s) IV Push once  dextrose 50% Injectable 25 Gram(s) IV Push once  docusate sodium 100 milliGRAM(s) Oral three times a day  enalapril 10 milliGRAM(s) Oral daily  famotidine    Tablet 20 milliGRAM(s) Oral daily  furosemide    Tablet 20 milliGRAM(s) Oral daily  insulin glargine Injectable (LANTUS) 8 Unit(s) SubCutaneous at bedtime  insulin lispro (HumaLOG) corrective regimen sliding scale   SubCutaneous three times a day before meals  insulin lispro (HumaLOG) corrective regimen sliding scale   SubCutaneous at bedtime  loratadine 10 milliGRAM(s) Oral daily  senna 2 Tablet(s) Oral at bedtime  spironolactone 12.5 milliGRAM(s) Oral daily  tiotropium 18 MICROgram(s) Capsule 1 Capsule(s) Inhalation daily    MEDICATIONS  (PRN):  acetaminophen   Tablet. 650 milliGRAM(s) Oral every 6 hours PRN Mild Pain (1 - 3)  dextrose Gel 1 Dose(s) Oral once PRN Blood Glucose LESS THAN 70 milliGRAM(s)/deciliter  glucagon  Injectable 1 milliGRAM(s) IntraMuscular once PRN Glucose LESS THAN 70 milligrams/deciliter  guaiFENesin   Syrup  (Sugar-Free) 100 milliGRAM(s) Oral every 6 hours PRN Cough      Vital Signs Last 24 Hrs  T(C): 36.6 (02 Dec 2017 04:54), Max: 36.8 (01 Dec 2017 20:43)  T(F): 97.8 (02 Dec 2017 04:54), Max: 98.2 (01 Dec 2017 20:43)  HR: 100 (02 Dec 2017 04:54) (88 - 108)  BP: 130/81 (02 Dec 2017 04:54) (123/77 - 164/94)  BP(mean): --  RR: 18 (02 Dec 2017 04:54) (18 - 19)  SpO2: 95% (02 Dec 2017 04:54) (95% - 98%)  CAPILLARY BLOOD GLUCOSE      POCT Blood Glucose.: 121 mg/dL (02 Dec 2017 07:15)  POCT Blood Glucose.: 185 mg/dL (01 Dec 2017 21:28)  POCT Blood Glucose.: 127 mg/dL (01 Dec 2017 17:19)  POCT Blood Glucose.: 260 mg/dL (01 Dec 2017 11:05)    I&O's Summary    01 Dec 2017 07:01  -  02 Dec 2017 07:00  --------------------------------------------------------  IN: 530 mL / OUT: 875 mL / NET: -345 mL    02 Dec 2017 07:01  -  02 Dec 2017 10:21  --------------------------------------------------------  IN: 360 mL / OUT: 0 mL / NET: 360 mL        PHYSICAL EXAM:  GENERAL: NAD, well-developed  HEAD:  Atraumatic, Normocephalic  EYES: EOMI, PERRLA, conjunctiva and sclera clear  NECK: Supple, No JVD  CHEST/LUNG: Clear to auscultation bilaterally; No wheeze  HEART: Regular rate and rhythm; No murmurs, rubs, or gallops  ABDOMEN: Soft, Nontender, Nondistended; Bowel sounds present  EXTREMITIES:  2+ Peripheral Pulses, No clubbing, cyanosis, +LE edema  PSYCH: AAOx3  NEUROLOGY: non-focal  SKIN: No rashes or lesions    LABS:                        12.8   8.6   )-----------( 160      ( 01 Dec 2017 09:34 )             38.8     12-01    141  |  100  |  15  ----------------------------<  176<H>  3.8   |  28  |  1.09    Ca    9.5      01 Dec 2017 09:33  Phos  3.4     12-01  Mg     1.6     12-01                RADIOLOGY & ADDITIONAL TESTS:    Imaging Personally Reviewed:  Tele reviewed: Lisa/SR , АЛЕКСАНДР's     Consultant(s) Notes Reviewed:      Care Discussed with Consultants/Other Providers:

## 2017-12-02 NOTE — PROGRESS NOTE ADULT - PROBLEM SELECTOR PLAN 7
-no wheezing on exam today  -continue with current regimen  -discussed avoidance of possible triggers.

## 2017-12-02 NOTE — PROGRESS NOTE ADULT - SUBJECTIVE AND OBJECTIVE BOX
24H hour events:   started on eliquis yesterday,  ICD site looks c/d/i. No enlargement of pocket site. tenderness improving.    tele sinus     MEDICATIONS:  apixaban 5 milliGRAM(s) Oral every 12 hours  aspirin enteric coated 81 milliGRAM(s) Oral daily  enalapril 10 milliGRAM(s) Oral daily  furosemide    Tablet 20 milliGRAM(s) Oral daily  spironolactone 12.5 milliGRAM(s) Oral daily      ALBUTerol    90 MICROgram(s) HFA Inhaler 2 Puff(s) Inhalation every 6 hours  buDESOnide 160 MICROgram(s)/formoterol 4.5 MICROgram(s) Inhaler 2 Puff(s) Inhalation two times a day  guaiFENesin   Syrup  (Sugar-Free) 100 milliGRAM(s) Oral every 6 hours PRN  loratadine 10 milliGRAM(s) Oral daily  tiotropium 18 MICROgram(s) Capsule 1 Capsule(s) Inhalation daily    acetaminophen   Tablet. 650 milliGRAM(s) Oral every 6 hours PRN    docusate sodium 100 milliGRAM(s) Oral three times a day  famotidine    Tablet 20 milliGRAM(s) Oral daily  senna 2 Tablet(s) Oral at bedtime    atorvastatin 20 milliGRAM(s) Oral at bedtime  dextrose 50% Injectable 12.5 Gram(s) IV Push once  dextrose 50% Injectable 25 Gram(s) IV Push once  dextrose 50% Injectable 25 Gram(s) IV Push once  dextrose Gel 1 Dose(s) Oral once PRN  glucagon  Injectable 1 milliGRAM(s) IntraMuscular once PRN  insulin glargine Injectable (LANTUS) 8 Unit(s) SubCutaneous at bedtime  insulin lispro (HumaLOG) corrective regimen sliding scale   SubCutaneous three times a day before meals  insulin lispro (HumaLOG) corrective regimen sliding scale   SubCutaneous at bedtime    dextrose 5%. 1000 milliLiter(s) IV Continuous <Continuous>      REVIEW OF SYSTEMS:  General: no fatigue/malaise, weight loss/gain.  Skin: no rashes.  Ophthalmologic: no blurred vision, no loss of vision. 	  ENT: no sore throat, rhinorrhea, sinus congestion.  Respiratory: no SOB, cough or wheeze.  Gastrointestinal:  no N/V/D, no melena/hematemesis/hematochezia.  Genitourinary: no dysuria/hesitancy or hematuria.  Musculoskeletal: no myalgias or arthralgias.  Neurological: no changes in vision or hearing, no lightheadedness/dizziness, no syncope/near syncope	  Psychiatric: no unusual stress/anxiety.   Hematology/Lymphatics: no unusual bleeding, bruising and no lymphadenopathy.  Endocrine: no unusual thirst.   All others negative except as stated above and in HPI.    PHYSICAL EXAM:  T(C): 36.6 (12-02-17 @ 04:54), Max: 36.8 (12-01-17 @ 20:43)  HR: 100 (12-02-17 @ 04:54) (88 - 108)  BP: 130/81 (12-02-17 @ 04:54) (123/77 - 164/94)  RR: 18 (12-02-17 @ 04:54) (18 - 19)  SpO2: 95% (12-02-17 @ 04:54) (95% - 98%)  Wt(kg): --  I&O's Summary    01 Dec 2017 07:01  -  02 Dec 2017 07:00  --------------------------------------------------------  IN: 530 mL / OUT: 875 mL / NET: -345 mL        Appearance: Normal	  HEENT:   Normal oral mucosa, PERRL, EOMI	  Lymphatic: No lymphadenopathy  Cardiovascular: Normal S1 S2, No JVD, No murmurs, No edema  Respiratory: Lungs clear to auscultation	  Psychiatry: A & O x 3, Mood & affect appropriate  Gastrointestinal:  Soft, Non-tender, + BS	  Skin: right ICD site c/d/i  Neurologic: Non-focal  Extremities: Normal range of motion, No clubbing, cyanosis or edema  Vascular: Peripheral pulses palpable 2+ bilaterally        LABS:	 	    CBC Full  -  ( 01 Dec 2017 09:34 )  WBC Count : 8.6 K/uL  Hemoglobin : 12.8 g/dL  Hematocrit : 38.8 %  Platelet Count - Automated : 160 K/uL  Mean Cell Volume : 100.0 fl  Mean Cell Hemoglobin : 33.0 pg  Mean Cell Hemoglobin Concentration : 33.0 gm/dL  Auto Neutrophil # : x  Auto Lymphocyte # : x  Auto Monocyte # : x  Auto Eosinophil # : x  Auto Basophil # : x  Auto Neutrophil % : x  Auto Lymphocyte % : x  Auto Monocyte % : x  Auto Eosinophil % : x  Auto Basophil % : x    12-01    141  |  100  |  15  ----------------------------<  176<H>  3.8   |  28  |  1.09  11-30    142  |  100  |  15  ----------------------------<  116<H>  3.8   |  30  |  0.90    Ca    9.5      01 Dec 2017 09:33  Ca    9.2      30 Nov 2017 08:49  Phos  3.4     12-01  Mg     1.6     12-01  Mg     1.6     11-30        proBNP: Serum Pro-Brain Natriuretic Peptide: 218 pg/mL (11-29-17 @ 08:26)

## 2017-12-02 NOTE — PROGRESS NOTE ADULT - PROBLEM SELECTOR PROBLEM 2
Ventricular tachycardia
ICD (implantable cardioverter-defibrillator) battery depletion
Ventricular tachycardia
Ventricular tachycardia
Diabetes

## 2017-12-02 NOTE — PROGRESS NOTE ADULT - ASSESSMENT
71 yo F with PMhx of obesity, HFrEF, NICMP, chronic systolic & diastolic heart failure (Echo 4/24/17: grossly mild LV systolic dysfunction), s/p BiV ICD (MDT), HTN, HLD, DM2, diverticulosis, gout, asthma presented to ER with 'feeling fluttering of heart'. BiV ICD interrogated on 11/29: had 2 episode of VT in April & June 2017 which treated with ATP. Also battery reached OLIVER (elective replacement indicator) on 10/1/17, Not PM dependent, Bi-V paced 98%. Now s/p BiV ICD (MDT) generator change on 11/30/17. Telemetry revealed new onset paroxysmal atrial flutter.
71 yo F with PMhx of obesity, HFrEF, NICMP, chronic systolic & diastolic heart failure (Echo 4/24/17: grossly mild LV systolic dysfunction), s/p BiV ICD (MDT), HTN, HLD, DM2, diverticulosis, gout, asthma presented to ER with 'feeling fluttering of heart'. BiV ICD interrogated on 11/29: had 2 episode of VT in April & June 2017 which treated with ATP. Also battery reached OLIVER (elective replacement indicator) on 10/1/17, Not PM dependent, Bi-V paced 98%. BiV ICD unable to store further episodes since OLIVER to perserve battery life.
72f hx severe persistent asthma, CAD, HFrEF (EF 36%) s/p AICD, HTN, DM-2, a/w new onset aflutter
72f hx severe persistent asthma, CAD, HFrEF (EF 36%),
72f hx severe persistent asthma, CAD, HFrEF (EF 36%),
73 yo F with PMhx of obesity, HFrEF, NICMP, chronic systolic & diastolic heart failure (Echo 4/24/17: grossly mild LV systolic dysfunction), s/p BiV ICD (MDT), HTN, HLD, DM2, diverticulosis, gout, asthma presented to ER with 'feeling fluttering of heart'. BiV ICD interrogated on 11/29: had 2 episode of VT in April & June 2017 which treated with ATP. Also battery reached OLIVER (elective replacement indicator) on 10/1/17, Not PM dependent, Bi-V paced 98%. Now s/p BiV ICD (MDT) generator change on 11/30/17. Telemetry revealed new onset paroxysmal atrial flutter.
Ms. Dolan is a 73 y/o F w/ h/o HFrEF (prior EF 25% 2009, subsequently improved to mild on most recent TTE 7/17), s/p [id number], HTN, HLD, DM2, diverticulosis, gout, asthma (prior intubation 1 year prior in setting of flu/pneumonia) req trach s/p decannulation who was admitted with chest fluttering (reportedly had atach). Noted to have prior episode of VT in April & June 2017 which treated with ATP. She underwent generator change of her AICD 11/30.     Currently she notes ongoing improvement. She diuresed fairly well over the past 24 hours with 1.2L of UOP (net - 740 mL). She is currently AHA/ACC Stage C, NYHA Class III HF. She remains volume overloaded at this point. Noted to have paroxysmal atrial tachycardia with episodes of aflutter noted on tele.

## 2017-12-06 ENCOUNTER — APPOINTMENT (OUTPATIENT)
Dept: ELECTROPHYSIOLOGY | Facility: CLINIC | Age: 72
End: 2017-12-06

## 2017-12-08 ENCOUNTER — NON-APPOINTMENT (OUTPATIENT)
Age: 72
End: 2017-12-08

## 2017-12-08 ENCOUNTER — APPOINTMENT (OUTPATIENT)
Dept: ELECTROPHYSIOLOGY | Facility: CLINIC | Age: 72
End: 2017-12-08
Payer: MEDICARE

## 2017-12-08 VITALS — OXYGEN SATURATION: 97 % | SYSTOLIC BLOOD PRESSURE: 150 MMHG | DIASTOLIC BLOOD PRESSURE: 75 MMHG | HEART RATE: 97 BPM

## 2017-12-08 PROCEDURE — 99024 POSTOP FOLLOW-UP VISIT: CPT

## 2017-12-13 ENCOUNTER — APPOINTMENT (OUTPATIENT)
Dept: CARDIOLOGY | Facility: CLINIC | Age: 72
End: 2017-12-13
Payer: MEDICARE

## 2017-12-13 VITALS
HEART RATE: 85 BPM | BODY MASS INDEX: 34.04 KG/M2 | HEIGHT: 62 IN | OXYGEN SATURATION: 97 % | SYSTOLIC BLOOD PRESSURE: 113 MMHG | WEIGHT: 185 LBS | DIASTOLIC BLOOD PRESSURE: 69 MMHG | RESPIRATION RATE: 16 BRPM

## 2017-12-13 PROCEDURE — 99213 OFFICE O/P EST LOW 20 MIN: CPT

## 2017-12-13 RX ORDER — REPAGLINIDE 0.5 MG/1
0.5 TABLET ORAL 3 TIMES DAILY
Refills: 0 | Status: ACTIVE | COMMUNITY
Start: 2017-12-13

## 2017-12-13 RX ORDER — SPIRONOLACTONE 25 MG/1
25 TABLET ORAL
Refills: 0 | Status: DISCONTINUED | COMMUNITY
End: 2017-12-13

## 2017-12-13 RX ORDER — REPAGLINIDE 1 MG/1
1 TABLET ORAL
Refills: 0 | Status: DISCONTINUED | COMMUNITY
End: 2017-12-13

## 2017-12-13 RX ORDER — INSULIN GLARGINE 100 [IU]/ML
INJECTION, SOLUTION SUBCUTANEOUS
Refills: 0 | Status: DISCONTINUED | COMMUNITY
End: 2017-12-13

## 2017-12-13 RX ORDER — ASPIRIN ENTERIC COATED TABLETS 81 MG 81 MG/1
81 TABLET, DELAYED RELEASE ORAL
Refills: 0 | Status: DISCONTINUED | COMMUNITY
End: 2017-12-13

## 2017-12-14 LAB — MAGNESIUM SERPL-MCNC: 1.9 MG/DL

## 2017-12-15 LAB
ANION GAP SERPL CALC-SCNC: 12 MMOL/L
BUN SERPL-MCNC: 20 MG/DL
CALCIUM SERPL-MCNC: 9.5 MG/DL
CHLORIDE SERPL-SCNC: 99 MMOL/L
CO2 SERPL-SCNC: 28 MMOL/L
CREAT SERPL-MCNC: 1 MG/DL
GLUCOSE SERPL-MCNC: 177 MG/DL
NT-PROBNP SERPL-MCNC: 71 PG/ML
POTASSIUM SERPL-SCNC: 4.5 MMOL/L
SODIUM SERPL-SCNC: 139 MMOL/L

## 2017-12-19 PROCEDURE — 82962 GLUCOSE BLOOD TEST: CPT

## 2017-12-19 PROCEDURE — 80053 COMPREHEN METABOLIC PANEL: CPT

## 2017-12-19 PROCEDURE — 85730 THROMBOPLASTIN TIME PARTIAL: CPT

## 2017-12-19 PROCEDURE — 82947 ASSAY GLUCOSE BLOOD QUANT: CPT

## 2017-12-19 PROCEDURE — 87186 SC STD MICRODIL/AGAR DIL: CPT

## 2017-12-19 PROCEDURE — 93005 ELECTROCARDIOGRAM TRACING: CPT

## 2017-12-19 PROCEDURE — 85027 COMPLETE CBC AUTOMATED: CPT

## 2017-12-19 PROCEDURE — 84100 ASSAY OF PHOSPHORUS: CPT

## 2017-12-19 PROCEDURE — 83880 ASSAY OF NATRIURETIC PEPTIDE: CPT

## 2017-12-19 PROCEDURE — 82550 ASSAY OF CK (CPK): CPT

## 2017-12-19 PROCEDURE — 33264 RMVL & RPLCMT DFB GEN MLT LD: CPT

## 2017-12-19 PROCEDURE — 71045 X-RAY EXAM CHEST 1 VIEW: CPT

## 2017-12-19 PROCEDURE — 80048 BASIC METABOLIC PNL TOTAL CA: CPT

## 2017-12-19 PROCEDURE — 82330 ASSAY OF CALCIUM: CPT

## 2017-12-19 PROCEDURE — 87633 RESP VIRUS 12-25 TARGETS: CPT

## 2017-12-19 PROCEDURE — 85014 HEMATOCRIT: CPT

## 2017-12-19 PROCEDURE — 87581 M.PNEUMON DNA AMP PROBE: CPT

## 2017-12-19 PROCEDURE — 71046 X-RAY EXAM CHEST 2 VIEWS: CPT

## 2017-12-19 PROCEDURE — 83605 ASSAY OF LACTIC ACID: CPT

## 2017-12-19 PROCEDURE — 83735 ASSAY OF MAGNESIUM: CPT

## 2017-12-19 PROCEDURE — 84295 ASSAY OF SERUM SODIUM: CPT

## 2017-12-19 PROCEDURE — 94640 AIRWAY INHALATION TREATMENT: CPT

## 2017-12-19 PROCEDURE — 82803 BLOOD GASES ANY COMBINATION: CPT

## 2017-12-19 PROCEDURE — 82565 ASSAY OF CREATININE: CPT

## 2017-12-19 PROCEDURE — 82435 ASSAY OF BLOOD CHLORIDE: CPT

## 2017-12-19 PROCEDURE — 99285 EMERGENCY DEPT VISIT HI MDM: CPT | Mod: 25

## 2017-12-19 PROCEDURE — C1882: CPT

## 2017-12-19 PROCEDURE — 85610 PROTHROMBIN TIME: CPT

## 2017-12-19 PROCEDURE — 87798 DETECT AGENT NOS DNA AMP: CPT

## 2017-12-19 PROCEDURE — 84484 ASSAY OF TROPONIN QUANT: CPT

## 2017-12-19 PROCEDURE — 84132 ASSAY OF SERUM POTASSIUM: CPT

## 2017-12-19 PROCEDURE — 84443 ASSAY THYROID STIM HORMONE: CPT

## 2017-12-19 PROCEDURE — 87086 URINE CULTURE/COLONY COUNT: CPT

## 2017-12-19 PROCEDURE — 82553 CREATINE MB FRACTION: CPT

## 2017-12-19 PROCEDURE — 81001 URINALYSIS AUTO W/SCOPE: CPT

## 2017-12-19 PROCEDURE — 82785 ASSAY OF IGE: CPT

## 2017-12-19 PROCEDURE — 83036 HEMOGLOBIN GLYCOSYLATED A1C: CPT

## 2017-12-19 PROCEDURE — 96374 THER/PROPH/DIAG INJ IV PUSH: CPT

## 2017-12-19 PROCEDURE — 87486 CHLMYD PNEUM DNA AMP PROBE: CPT

## 2017-12-20 ENCOUNTER — OTHER (OUTPATIENT)
Age: 72
End: 2017-12-20

## 2017-12-26 ENCOUNTER — APPOINTMENT (OUTPATIENT)
Dept: ELECTROPHYSIOLOGY | Facility: CLINIC | Age: 72
End: 2017-12-26
Payer: MEDICARE

## 2017-12-26 ENCOUNTER — NON-APPOINTMENT (OUTPATIENT)
Age: 72
End: 2017-12-26

## 2017-12-26 VITALS
BODY MASS INDEX: 33.49 KG/M2 | DIASTOLIC BLOOD PRESSURE: 76 MMHG | HEART RATE: 101 BPM | SYSTOLIC BLOOD PRESSURE: 117 MMHG | HEIGHT: 62 IN | WEIGHT: 182 LBS | OXYGEN SATURATION: 96 %

## 2017-12-26 PROCEDURE — 99024 POSTOP FOLLOW-UP VISIT: CPT

## 2018-01-03 ENCOUNTER — APPOINTMENT (OUTPATIENT)
Dept: CARDIOLOGY | Facility: CLINIC | Age: 73
End: 2018-01-03
Payer: MEDICARE

## 2018-01-03 VITALS
RESPIRATION RATE: 16 BRPM | HEIGHT: 62 IN | BODY MASS INDEX: 33.49 KG/M2 | SYSTOLIC BLOOD PRESSURE: 114 MMHG | DIASTOLIC BLOOD PRESSURE: 70 MMHG | OXYGEN SATURATION: 93 % | HEART RATE: 95 BPM | WEIGHT: 182 LBS

## 2018-01-03 PROCEDURE — 99214 OFFICE O/P EST MOD 30 MIN: CPT

## 2018-01-23 ENCOUNTER — INPATIENT (INPATIENT)
Facility: HOSPITAL | Age: 73
LOS: 2 days | Discharge: HOME | DRG: 191 | End: 2018-01-26
Attending: INTERNAL MEDICINE | Admitting: INTERNAL MEDICINE
Payer: MEDICARE

## 2018-01-23 ENCOUNTER — APPOINTMENT (OUTPATIENT)
Dept: CARDIOLOGY | Facility: CLINIC | Age: 73
End: 2018-01-23
Payer: MEDICARE

## 2018-01-23 VITALS
RESPIRATION RATE: 18 BRPM | DIASTOLIC BLOOD PRESSURE: 72 MMHG | HEART RATE: 96 BPM | OXYGEN SATURATION: 100 % | SYSTOLIC BLOOD PRESSURE: 148 MMHG

## 2018-01-23 VITALS
RESPIRATION RATE: 16 BRPM | HEART RATE: 96 BPM | SYSTOLIC BLOOD PRESSURE: 119 MMHG | DIASTOLIC BLOOD PRESSURE: 72 MMHG | BODY MASS INDEX: 34.41 KG/M2 | OXYGEN SATURATION: 96 % | HEIGHT: 62 IN | WEIGHT: 187 LBS

## 2018-01-23 DIAGNOSIS — J44.1 CHRONIC OBSTRUCTIVE PULMONARY DISEASE WITH (ACUTE) EXACERBATION: ICD-10-CM

## 2018-01-23 LAB
ALBUMIN SERPL ELPH-MCNC: 4.4 G/DL — SIGNIFICANT CHANGE UP (ref 3.3–5)
ALP SERPL-CCNC: 93 U/L — SIGNIFICANT CHANGE UP (ref 40–120)
ALT FLD-CCNC: 19 U/L RC — SIGNIFICANT CHANGE UP (ref 10–45)
ANION GAP SERPL CALC-SCNC: 14 MMOL/L — SIGNIFICANT CHANGE UP (ref 5–17)
AST SERPL-CCNC: 20 U/L — SIGNIFICANT CHANGE UP (ref 10–40)
BASE EXCESS BLDV CALC-SCNC: 2.3 MMOL/L — HIGH (ref -2–2)
BASOPHILS # BLD AUTO: 0 K/UL — SIGNIFICANT CHANGE UP (ref 0–0.2)
BASOPHILS NFR BLD AUTO: 0.2 % — SIGNIFICANT CHANGE UP (ref 0–2)
BILIRUB SERPL-MCNC: 0.4 MG/DL — SIGNIFICANT CHANGE UP (ref 0.2–1.2)
BUN SERPL-MCNC: 24 MG/DL — HIGH (ref 7–23)
CA-I SERPL-SCNC: 1.2 MMOL/L — SIGNIFICANT CHANGE UP (ref 1.12–1.3)
CALCIUM SERPL-MCNC: 9.9 MG/DL — SIGNIFICANT CHANGE UP (ref 8.4–10.5)
CHLORIDE BLDV-SCNC: 105 MMOL/L — SIGNIFICANT CHANGE UP (ref 96–108)
CHLORIDE SERPL-SCNC: 101 MMOL/L — SIGNIFICANT CHANGE UP (ref 96–108)
CK MB BLD-MCNC: 2.1 % — SIGNIFICANT CHANGE UP (ref 0–3.5)
CK MB CFR SERPL CALC: 4.9 NG/ML — HIGH (ref 0–3.8)
CK SERPL-CCNC: 231 U/L — HIGH (ref 25–170)
CO2 BLDV-SCNC: 29 MMOL/L — SIGNIFICANT CHANGE UP (ref 22–30)
CO2 SERPL-SCNC: 26 MMOL/L — SIGNIFICANT CHANGE UP (ref 22–31)
CREAT SERPL-MCNC: 1.1 MG/DL — SIGNIFICANT CHANGE UP (ref 0.5–1.3)
EOSINOPHIL # BLD AUTO: 0.1 K/UL — SIGNIFICANT CHANGE UP (ref 0–0.5)
EOSINOPHIL NFR BLD AUTO: 0.8 % — SIGNIFICANT CHANGE UP (ref 0–6)
GAS PNL BLDV: 138 MMOL/L — SIGNIFICANT CHANGE UP (ref 136–145)
GAS PNL BLDV: SIGNIFICANT CHANGE UP
GLUCOSE BLDC GLUCOMTR-MCNC: 161 MG/DL — HIGH (ref 70–99)
GLUCOSE BLDV-MCNC: 160 MG/DL — HIGH (ref 70–99)
GLUCOSE SERPL-MCNC: 161 MG/DL — HIGH (ref 70–99)
HCO3 BLDV-SCNC: 28 MMOL/L — SIGNIFICANT CHANGE UP (ref 21–29)
HCOV NL63 RNA SPEC QL NAA+PROBE: DETECTED
HCT VFR BLD CALC: 35 % — SIGNIFICANT CHANGE UP (ref 34.5–45)
HCT VFR BLDA CALC: 38 % — LOW (ref 39–50)
HGB BLD CALC-MCNC: 12.3 G/DL — SIGNIFICANT CHANGE UP (ref 11.5–15.5)
HGB BLD-MCNC: 12.2 G/DL — SIGNIFICANT CHANGE UP (ref 11.5–15.5)
LACTATE BLDV-MCNC: 2.2 MMOL/L — HIGH (ref 0.7–2)
LYMPHOCYTES # BLD AUTO: 1.8 K/UL — SIGNIFICANT CHANGE UP (ref 1–3.3)
LYMPHOCYTES # BLD AUTO: 18.5 % — SIGNIFICANT CHANGE UP (ref 13–44)
MAGNESIUM SERPL-MCNC: 1.8 MG/DL — SIGNIFICANT CHANGE UP (ref 1.6–2.6)
MCHC RBC-ENTMCNC: 34.8 PG — HIGH (ref 27–34)
MCHC RBC-ENTMCNC: 34.9 GM/DL — SIGNIFICANT CHANGE UP (ref 32–36)
MCV RBC AUTO: 99.7 FL — SIGNIFICANT CHANGE UP (ref 80–100)
MONOCYTES # BLD AUTO: 0.7 K/UL — SIGNIFICANT CHANGE UP (ref 0–0.9)
MONOCYTES NFR BLD AUTO: 7.4 % — SIGNIFICANT CHANGE UP (ref 2–14)
NEUTROPHILS # BLD AUTO: 7.3 K/UL — SIGNIFICANT CHANGE UP (ref 1.8–7.4)
NEUTROPHILS NFR BLD AUTO: 73.1 % — SIGNIFICANT CHANGE UP (ref 43–77)
NT-PROBNP SERPL-SCNC: 122 PG/ML — SIGNIFICANT CHANGE UP (ref 0–300)
PCO2 BLDV: 48 MMHG — SIGNIFICANT CHANGE UP (ref 35–50)
PH BLDV: 7.38 — SIGNIFICANT CHANGE UP (ref 7.35–7.45)
PHOSPHATE SERPL-MCNC: 3.6 MG/DL — SIGNIFICANT CHANGE UP (ref 2.5–4.5)
PLATELET # BLD AUTO: 160 K/UL — SIGNIFICANT CHANGE UP (ref 150–400)
PO2 BLDV: 42 MMHG — SIGNIFICANT CHANGE UP (ref 25–45)
POTASSIUM BLDV-SCNC: 4.5 MMOL/L — SIGNIFICANT CHANGE UP (ref 3.5–5)
POTASSIUM SERPL-MCNC: 4.8 MMOL/L — SIGNIFICANT CHANGE UP (ref 3.5–5.3)
POTASSIUM SERPL-SCNC: 4.8 MMOL/L — SIGNIFICANT CHANGE UP (ref 3.5–5.3)
PROT SERPL-MCNC: 7.5 G/DL — SIGNIFICANT CHANGE UP (ref 6–8.3)
RAPID RVP RESULT: DETECTED
RBC # BLD: 3.51 M/UL — LOW (ref 3.8–5.2)
RBC # FLD: 12.6 % — SIGNIFICANT CHANGE UP (ref 10.3–14.5)
SAO2 % BLDV: 73 % — SIGNIFICANT CHANGE UP (ref 67–88)
SODIUM SERPL-SCNC: 141 MMOL/L — SIGNIFICANT CHANGE UP (ref 135–145)
TROPONIN T SERPL-MCNC: 0.02 NG/ML — SIGNIFICANT CHANGE UP (ref 0–0.06)
WBC # BLD: 10 K/UL — SIGNIFICANT CHANGE UP (ref 3.8–10.5)
WBC # FLD AUTO: 10 K/UL — SIGNIFICANT CHANGE UP (ref 3.8–10.5)

## 2018-01-23 PROCEDURE — 99285 EMERGENCY DEPT VISIT HI MDM: CPT | Mod: 25

## 2018-01-23 PROCEDURE — 99214 OFFICE O/P EST MOD 30 MIN: CPT

## 2018-01-23 PROCEDURE — 93010 ELECTROCARDIOGRAM REPORT: CPT

## 2018-01-23 PROCEDURE — 99223 1ST HOSP IP/OBS HIGH 75: CPT

## 2018-01-23 PROCEDURE — 71045 X-RAY EXAM CHEST 1 VIEW: CPT | Mod: 26

## 2018-01-23 RX ORDER — IPRATROPIUM/ALBUTEROL SULFATE 18-103MCG
3 AEROSOL WITH ADAPTER (GRAM) INHALATION ONCE
Qty: 0 | Refills: 0 | Status: COMPLETED | OUTPATIENT
Start: 2018-01-23 | End: 2018-01-23

## 2018-01-23 RX ORDER — DOCUSATE SODIUM 100 MG
100 CAPSULE ORAL THREE TIMES A DAY
Qty: 0 | Refills: 0 | Status: DISCONTINUED | OUTPATIENT
Start: 2018-01-23 | End: 2018-01-26

## 2018-01-23 RX ORDER — INSULIN GLARGINE 100 [IU]/ML
8 INJECTION, SOLUTION SUBCUTANEOUS ONCE
Qty: 0 | Refills: 0 | Status: COMPLETED | OUTPATIENT
Start: 2018-01-23 | End: 2018-01-23

## 2018-01-23 RX ORDER — FUROSEMIDE 40 MG
20 TABLET ORAL DAILY
Qty: 0 | Refills: 0 | Status: DISCONTINUED | OUTPATIENT
Start: 2018-01-23 | End: 2018-01-26

## 2018-01-23 RX ORDER — BUDESONIDE AND FORMOTEROL FUMARATE DIHYDRATE 160; 4.5 UG/1; UG/1
2 AEROSOL RESPIRATORY (INHALATION)
Qty: 0 | Refills: 0 | Status: DISCONTINUED | OUTPATIENT
Start: 2018-01-23 | End: 2018-01-26

## 2018-01-23 RX ORDER — SPIRONOLACTONE 25 MG/1
25 TABLET, FILM COATED ORAL DAILY
Qty: 0 | Refills: 0 | Status: DISCONTINUED | OUTPATIENT
Start: 2018-01-23 | End: 2018-01-26

## 2018-01-23 RX ORDER — ATORVASTATIN CALCIUM 80 MG/1
20 TABLET, FILM COATED ORAL AT BEDTIME
Qty: 0 | Refills: 0 | Status: DISCONTINUED | OUTPATIENT
Start: 2018-01-23 | End: 2018-01-26

## 2018-01-23 RX ORDER — APIXABAN 2.5 MG/1
5 TABLET, FILM COATED ORAL ONCE
Qty: 0 | Refills: 0 | Status: COMPLETED | OUTPATIENT
Start: 2018-01-23 | End: 2018-01-23

## 2018-01-23 RX ORDER — ASPIRIN/CALCIUM CARB/MAGNESIUM 324 MG
81 TABLET ORAL DAILY
Qty: 0 | Refills: 0 | Status: DISCONTINUED | OUTPATIENT
Start: 2018-01-23 | End: 2018-01-26

## 2018-01-23 RX ORDER — TIOTROPIUM BROMIDE 18 UG/1
1 CAPSULE ORAL; RESPIRATORY (INHALATION) DAILY
Qty: 0 | Refills: 0 | Status: DISCONTINUED | OUTPATIENT
Start: 2018-01-23 | End: 2018-01-26

## 2018-01-23 RX ORDER — FAMOTIDINE 10 MG/ML
20 INJECTION INTRAVENOUS DAILY
Qty: 0 | Refills: 0 | Status: DISCONTINUED | OUTPATIENT
Start: 2018-01-23 | End: 2018-01-26

## 2018-01-23 RX ORDER — SENNA PLUS 8.6 MG/1
2 TABLET ORAL AT BEDTIME
Qty: 0 | Refills: 0 | Status: DISCONTINUED | OUTPATIENT
Start: 2018-01-23 | End: 2018-01-26

## 2018-01-23 RX ADMIN — Medication 3 MILLILITER(S): at 18:41

## 2018-01-23 NOTE — H&P ADULT - NSHPLABSRESULTS_GEN_ALL_CORE
Labs personally reviewed:                          12.2   10.0  )-----------( 160      ( 23 Jan 2018 18:47 )             35.0     01-23    141  |  101  |  24<H>  ----------------------------<  161<H>  4.8   |  26  |  1.10    Ca    9.9      23 Jan 2018 18:47  Phos  3.6     01-23  Mg     1.8     01-23    TPro  7.5  /  Alb  4.4  /  TBili  0.4  /  DBili  x   /  AST  20  /  ALT  19  /  AlkPhos  93  01-23    CARDIAC MARKERS ( 23 Jan 2018 18:47 )  x     / 0.02 ng/mL / 231 U/L / x     / 4.9 ng/mL      LIVER FUNCTIONS - ( 23 Jan 2018 18:47 )  Alb: 4.4 g/dL / Pro: 7.5 g/dL / ALK PHOS: 93 U/L / ALT: 19 U/L RC / AST: 20 U/L / GGT: x               CAPILLARY BLOOD GLUCOSE      POCT Blood Glucose.: 161 mg/dL (23 Jan 2018 21:39)      Imaging:  CXR personally reviewed: no focal consolidation; increased pulmonary vascular congestion    EKG personally reviewed: paced rhythm

## 2018-01-23 NOTE — H&P ADULT - NSHPREVIEWOFSYSTEMS_GEN_ALL_CORE
CONSTITUTIONAL: gerneralized weakness  EYES/ENT: No visual changes;  No dysphagia  NECK: No pain or stiffness  RESPIRATORY: +cough, SOB  CARDIOVASCULAR: No chest pain or palpitations; No lower extremity edema  EXTREMITIES: no le edema, cyanosis, clubbing  MUSCULOSKELETAL: no joint pain, +leg swelling  GASTROINTESTINAL: No abdominal or epigastric pain. No nausea, vomiting, or hematemesis; No diarrhea or constipation. No melena or hematochezia.  BACK: No back pain  GENITOURINARY: No dysuria, frequency or hematuria  NEUROLOGICAL: No numbness or weakness  SKIN: No itching, burning, rashes, or lesions   PSYCH: no agitation  All other review of systems is negative unless indicated above.

## 2018-01-23 NOTE — H&P ADULT - NSHPPHYSICALEXAM_GEN_ALL_CORE
PHYSICAL EXAM:  Vital Signs Last 24 Hrs  T(C): 36.4 (01-23-18 @ 21:48)  T(F): 97.5 (01-23-18 @ 21:48), Max: 98 (01-23-18 @ 18:20)  HR: 94 (01-23-18 @ 21:48) (94 - 109)  BP: 125/64 (01-23-18 @ 21:48)  BP(mean): --  RR: 18 (01-23-18 @ 21:48) (18 - 20)  SpO2: 95% (01-23-18 @ 21:48) (95% - 100%)  Wt(kg): --    01-23 @ 07:01  -  01-24 @ 00:10  --------------------------------------------------------  IN: 100 mL / OUT: 0 mL / NET: 100 mL      Constitutional: NAD, awake and alert  EYES: EOMI  ENT:  Normal Hearing, no tonsillar exudates   Neck: Soft and supple, No JVD  Lungs: Bibasilar crackles, no wheezing  Heart: S1 and S2, regular rate and rhythm, no Murmurs, gallops or rubs  Abdomen: Bowel Sounds present, soft, nontender, nondistended, no guarding, no rebound  Extremities: No cyanosis or clubbing; warm to touch  Vascular: 2+ peripheral pulses lower ex  Neurological: A/O x 3, no focal deficits  Musculoskeletal: 5/5 strength b/l upper and lower extremities; +2 edema b/l  Skin: No rashes  Psych: no depression or anhedonia  HEME: no bruises, no nose bleeds

## 2018-01-23 NOTE — ED ADULT NURSE REASSESSMENT NOTE - NS ED NURSE REASSESS COMMENT FT1
Received report from previous shift RN. Patient completed neb treatments and reports improvement in breathing. Denies CP, abd pain, n/v/d, fever, chills. Pt endorses dry cough. Family at bedside, aware of plan of care for admission. Pending RVP results. VSS

## 2018-01-23 NOTE — ED ADULT NURSE NOTE - OBJECTIVE STATEMENT
73 y/o female BIBA for SOB. Pt states she has had SOB for the past two days with a productive cough w. green and yellow sputum. Hx of COPD and CHF. Denies chest pain, ha, n/v/d, abdominal pain, f/c, urinary symptoms, hematuria. A&Ox4, vss, skin warm dry and intact, MAEx4, wheezing noted to lungs abd soft distended and obese, nontender to touch, bilateral LE plus 4 edema noted. Pt resting comfortably with VSS, no complaints at this time. Patient's bed in the lowest position, explained plan of care to patient and family members. Will continue to reassess.

## 2018-01-23 NOTE — H&P ADULT - PSH
AICD (Automatic Cardioverter/Defibrillator) Present  inserted in Aug, 2008. Due for battery change in 1 month. ( [employer]) . Inserted by Dr Duffy  S/P cholecystectomy

## 2018-01-23 NOTE — H&P ADULT - PROBLEM SELECTOR PLAN 2
symptomatic management  c/w radha  c/w guaifenessin  hold off IVF given underlying heart failure symptomatic management  c/w duonebs  c/w guaifenessin  c/w acetaminophen  hold off IVF given underlying heart failure

## 2018-01-23 NOTE — PATIENT PROFILE ADULT. - MEDICATION ADMINISTRATION INFO, PROFILE
Left message for patient to call in regards to screening mammogram. She need to be scheduled for diagnostic right mammogram and ultrasound. Orders are in Epic.   no concerns

## 2018-01-23 NOTE — ED PROVIDER NOTE - PSH
AICD (Automatic Cardioverter/Defibrillator) Present  inserted in Aug, 2008. Due for battery change in 1 month. ( [employer]) . Inserted by Dr Duffy  S/P cholecystectomy    S/P Cholecystectomy

## 2018-01-23 NOTE — ED PROVIDER NOTE - CARE PLAN
Principal Discharge DX:	COPD exacerbation Principal Discharge DX:	COPD exacerbation  Secondary Diagnosis:	Congestive Heart Failure  Secondary Diagnosis:	Hypoxia

## 2018-01-23 NOTE — H&P ADULT - PMH
Asthma    Atrial flutter    Cardiac Pacemaker    Cardiomyopathy    Congestive Heart Failure    COPD (chronic obstructive pulmonary disease)    Diabetes    Diverticulitis    Gout    HTN - Hypertension    Kidney stone

## 2018-01-23 NOTE — ED PROVIDER NOTE - ATTENDING CONTRIBUTION TO CARE
71yo female history of COPD, CHF, PPM, DM, on eliquis, on lasix p/w shortness of breath x1 day. Hypoxic in ED, with increased WOB   Plan: Labs, EKG, Nebs, steriods, IV abxs, CXR, RVP, admit, reassess

## 2018-01-23 NOTE — H&P ADULT - PROBLEM SELECTOR PLAN 1
Patient with asthma exacerbation Patient with asthma exacerbation likely due to underlying viral infection  c/w radha for now  hold off on adding antibiotics  will start patient on prednisone 40mg daily Patient with asthma exacerbation likely due to underlying viral infection  c/w radha for now  hold off on adding antibiotics  hold off on steroid unless patient's symptoms are worsening

## 2018-01-23 NOTE — H&P ADULT - ASSESSMENT
71 yo female with PMH of Asthma with frequent exacerbation, systolic CHF, s/p AICD, aflutter on eliquis, T2DM, diverticulitis, gout, HTN, HLD, presents here with SOB.

## 2018-01-23 NOTE — ED ADULT NURSE REASSESSMENT NOTE - NS ED NURSE REASSESS COMMENT FT1
Pt received bed assignment. Pt aware. Report given to RN. VSS. Pt stable for transport. Chart given to charge desk. Okay to go off tele to floor

## 2018-01-23 NOTE — H&P ADULT - HISTORY OF PRESENT ILLNESS
73 yo female with PMH of Asthma with frequent exacerbation, systolic CHF, s/p AICD, aflutter on eliquis, T2DM, diverticulitis, gout, HTN, HLD, presents here with SOB.  Patient states that she has been having SOB and cough for a long time.  She was seen by her pulmonologist who prescribed her azithromycin which she did not start taking until 4 days ago.  She says with azithromycin her symptoms improved slightly.  This morning she went to her cardiologist office.  After returning home she started having worsening of her cough with thick yellow sputum.  She also has been having muscle spasms under her breast both sides.  She denies any fever.  Her daughter is also sick with cough and cold like symptoms.  She does have dyspnea with exertion, but denies orthopnea or PND.

## 2018-01-23 NOTE — ED PROVIDER NOTE - OBJECTIVE STATEMENT
73yo female history of COPD, CHF, PPM, DM, on eliquis, on lasix p/w shortness of breath x1 day. Pt. placed on azithromycin by Neena Blue(PMD) 2 days prior for cough. Pt. say cardiologistJoce today, and sent home. Pt. given 2 duonebs by EMS prior to arrival with mild improvement according to patient. Denies cp, fevers, vomiting, weakness, falls, diarrhea.

## 2018-01-23 NOTE — ED ADULT NURSE NOTE - PSH
AICD (Automatic Cardioverter/Defibrillator) Present  inserted in Aug, 2008. Due for battery change in 1 month. ( [employer]) . Inserted by Dr Duffy  S/P cholecystectomy    S/P Cholecystectomy

## 2018-01-23 NOTE — H&P ADULT - PROBLEM SELECTOR PLAN 5
s/p AICD  check outpatient echocardiogram  monitor ins/outs  trend cardiac enzymes given nonspecific complains of muscle pain under breast b/l s/p AICD  check outpatient echocardiogram  monitor ins/outs  c/w home dose lasix  trend cardiac enzymes given nonspecific complains of muscle pain under breast b/l

## 2018-01-24 DIAGNOSIS — I50.20 UNSPECIFIED SYSTOLIC (CONGESTIVE) HEART FAILURE: ICD-10-CM

## 2018-01-24 DIAGNOSIS — J45.901 UNSPECIFIED ASTHMA WITH (ACUTE) EXACERBATION: ICD-10-CM

## 2018-01-24 DIAGNOSIS — I48.92 UNSPECIFIED ATRIAL FLUTTER: ICD-10-CM

## 2018-01-24 DIAGNOSIS — B34.2 CORONAVIRUS INFECTION, UNSPECIFIED: ICD-10-CM

## 2018-01-24 DIAGNOSIS — I50.9 HEART FAILURE, UNSPECIFIED: ICD-10-CM

## 2018-01-24 DIAGNOSIS — E11.9 TYPE 2 DIABETES MELLITUS WITHOUT COMPLICATIONS: ICD-10-CM

## 2018-01-24 DIAGNOSIS — Z29.9 ENCOUNTER FOR PROPHYLACTIC MEASURES, UNSPECIFIED: ICD-10-CM

## 2018-01-24 LAB
ALBUMIN SERPL ELPH-MCNC: 3.8 G/DL — SIGNIFICANT CHANGE UP (ref 3.3–5)
ALBUMIN SERPL ELPH-MCNC: 4.5 G/DL
ALP BLD-CCNC: 96 U/L
ALP SERPL-CCNC: 68 U/L — SIGNIFICANT CHANGE UP (ref 40–120)
ALT FLD-CCNC: 18 U/L RC — SIGNIFICANT CHANGE UP (ref 10–45)
ALT SERPL-CCNC: 15 U/L
ANION GAP SERPL CALC-SCNC: 13 MMOL/L — SIGNIFICANT CHANGE UP (ref 5–17)
ANION GAP SERPL CALC-SCNC: 18 MMOL/L
AST SERPL-CCNC: 19 U/L — SIGNIFICANT CHANGE UP (ref 10–40)
AST SERPL-CCNC: 25 U/L
BILIRUB SERPL-MCNC: 0.4 MG/DL
BILIRUB SERPL-MCNC: 0.4 MG/DL — SIGNIFICANT CHANGE UP (ref 0.2–1.2)
BUN SERPL-MCNC: 21 MG/DL — SIGNIFICANT CHANGE UP (ref 7–23)
BUN SERPL-MCNC: 25 MG/DL
CALCIUM SERPL-MCNC: 10.3 MG/DL
CALCIUM SERPL-MCNC: 9.4 MG/DL — SIGNIFICANT CHANGE UP (ref 8.4–10.5)
CHLORIDE SERPL-SCNC: 122 MMOL/L — HIGH (ref 96–108)
CHLORIDE SERPL-SCNC: 99 MMOL/L
CHOLEST SERPL-MCNC: 144 MG/DL
CHOLEST/HDLC SERPL: 1.8 RATIO
CK MB CFR SERPL CALC: 3.7 NG/ML — SIGNIFICANT CHANGE UP (ref 0–3.8)
CO2 SERPL-SCNC: 25 MMOL/L
CO2 SERPL-SCNC: 26 MMOL/L — SIGNIFICANT CHANGE UP (ref 22–31)
CREAT SERPL-MCNC: 1.03 MG/DL — SIGNIFICANT CHANGE UP (ref 0.5–1.3)
CREAT SERPL-MCNC: 1.09 MG/DL
GLUCOSE BLDC GLUCOMTR-MCNC: 102 MG/DL — HIGH (ref 70–99)
GLUCOSE BLDC GLUCOMTR-MCNC: 160 MG/DL — HIGH (ref 70–99)
GLUCOSE BLDC GLUCOMTR-MCNC: 184 MG/DL — HIGH (ref 70–99)
GLUCOSE BLDC GLUCOMTR-MCNC: 199 MG/DL — HIGH (ref 70–99)
GLUCOSE SERPL-MCNC: 154 MG/DL
GLUCOSE SERPL-MCNC: 71 MG/DL — SIGNIFICANT CHANGE UP (ref 70–99)
HCT VFR BLD CALC: 33.2 % — LOW (ref 34.5–45)
HDLC SERPL-MCNC: 80 MG/DL
HGB BLD-MCNC: 11.5 G/DL — SIGNIFICANT CHANGE UP (ref 11.5–15.5)
LDLC SERPL CALC-MCNC: 55 MG/DL
MAGNESIUM SERPL-MCNC: 1.9 MG/DL — SIGNIFICANT CHANGE UP (ref 1.6–2.6)
MCHC RBC-ENTMCNC: 34.7 GM/DL — SIGNIFICANT CHANGE UP (ref 32–36)
MCHC RBC-ENTMCNC: 34.9 PG — HIGH (ref 27–34)
MCV RBC AUTO: 101 FL — HIGH (ref 80–100)
PHOSPHATE SERPL-MCNC: 3.7 MG/DL — SIGNIFICANT CHANGE UP (ref 2.5–4.5)
PLATELET # BLD AUTO: 155 K/UL — SIGNIFICANT CHANGE UP (ref 150–400)
POTASSIUM SERPL-MCNC: 4.4 MMOL/L — SIGNIFICANT CHANGE UP (ref 3.5–5.3)
POTASSIUM SERPL-SCNC: 4.4 MMOL/L
POTASSIUM SERPL-SCNC: 4.4 MMOL/L — SIGNIFICANT CHANGE UP (ref 3.5–5.3)
PROT SERPL-MCNC: 7 G/DL — SIGNIFICANT CHANGE UP (ref 6–8.3)
PROT SERPL-MCNC: 8.2 G/DL
RBC # BLD: 3.31 M/UL — LOW (ref 3.8–5.2)
RBC # FLD: 12.6 % — SIGNIFICANT CHANGE UP (ref 10.3–14.5)
SODIUM SERPL-SCNC: 141 MMOL/L — SIGNIFICANT CHANGE UP (ref 135–145)
SODIUM SERPL-SCNC: 142 MMOL/L
TRIGL SERPL-MCNC: 45 MG/DL
TROPONIN T SERPL-MCNC: 0.01 NG/ML — SIGNIFICANT CHANGE UP (ref 0–0.06)
WBC # BLD: 8.6 K/UL — SIGNIFICANT CHANGE UP (ref 3.8–10.5)
WBC # FLD AUTO: 8.6 K/UL — SIGNIFICANT CHANGE UP (ref 3.8–10.5)

## 2018-01-24 PROCEDURE — 93010 ELECTROCARDIOGRAM REPORT: CPT

## 2018-01-24 RX ORDER — INSULIN LISPRO 100/ML
VIAL (ML) SUBCUTANEOUS AT BEDTIME
Qty: 0 | Refills: 0 | Status: DISCONTINUED | OUTPATIENT
Start: 2018-01-24 | End: 2018-01-26

## 2018-01-24 RX ORDER — MONTELUKAST 4 MG/1
10 TABLET, CHEWABLE ORAL DAILY
Qty: 0 | Refills: 0 | Status: DISCONTINUED | OUTPATIENT
Start: 2018-01-24 | End: 2018-01-26

## 2018-01-24 RX ORDER — INSULIN GLARGINE 100 [IU]/ML
8 INJECTION, SOLUTION SUBCUTANEOUS AT BEDTIME
Qty: 0 | Refills: 0 | Status: DISCONTINUED | OUTPATIENT
Start: 2018-01-24 | End: 2018-01-25

## 2018-01-24 RX ORDER — DEXTROSE 50 % IN WATER 50 %
12.5 SYRINGE (ML) INTRAVENOUS ONCE
Qty: 0 | Refills: 0 | Status: DISCONTINUED | OUTPATIENT
Start: 2018-01-24 | End: 2018-01-26

## 2018-01-24 RX ORDER — DEXTROSE 50 % IN WATER 50 %
25 SYRINGE (ML) INTRAVENOUS ONCE
Qty: 0 | Refills: 0 | Status: DISCONTINUED | OUTPATIENT
Start: 2018-01-24 | End: 2018-01-26

## 2018-01-24 RX ORDER — APIXABAN 2.5 MG/1
5 TABLET, FILM COATED ORAL EVERY 12 HOURS
Qty: 0 | Refills: 0 | Status: DISCONTINUED | OUTPATIENT
Start: 2018-01-24 | End: 2018-01-26

## 2018-01-24 RX ORDER — GLUCAGON INJECTION, SOLUTION 0.5 MG/.1ML
1 INJECTION, SOLUTION SUBCUTANEOUS ONCE
Qty: 0 | Refills: 0 | Status: DISCONTINUED | OUTPATIENT
Start: 2018-01-24 | End: 2018-01-26

## 2018-01-24 RX ORDER — INSULIN LISPRO 100/ML
VIAL (ML) SUBCUTANEOUS
Qty: 0 | Refills: 0 | Status: DISCONTINUED | OUTPATIENT
Start: 2018-01-24 | End: 2018-01-26

## 2018-01-24 RX ORDER — ACETAMINOPHEN 500 MG
650 TABLET ORAL EVERY 6 HOURS
Qty: 0 | Refills: 0 | Status: DISCONTINUED | OUTPATIENT
Start: 2018-01-24 | End: 2018-01-26

## 2018-01-24 RX ORDER — DEXTROSE 50 % IN WATER 50 %
1 SYRINGE (ML) INTRAVENOUS ONCE
Qty: 0 | Refills: 0 | Status: DISCONTINUED | OUTPATIENT
Start: 2018-01-24 | End: 2018-01-26

## 2018-01-24 RX ORDER — MAGNESIUM HYDROXIDE 400 MG/1
30 TABLET, CHEWABLE ORAL ONCE
Qty: 0 | Refills: 0 | Status: COMPLETED | OUTPATIENT
Start: 2018-01-24 | End: 2018-01-24

## 2018-01-24 RX ORDER — IPRATROPIUM/ALBUTEROL SULFATE 18-103MCG
3 AEROSOL WITH ADAPTER (GRAM) INHALATION EVERY 6 HOURS
Qty: 0 | Refills: 0 | Status: DISCONTINUED | OUTPATIENT
Start: 2018-01-24 | End: 2018-01-26

## 2018-01-24 RX ORDER — SODIUM CHLORIDE 9 MG/ML
1000 INJECTION, SOLUTION INTRAVENOUS
Qty: 0 | Refills: 0 | Status: DISCONTINUED | OUTPATIENT
Start: 2018-01-24 | End: 2018-01-26

## 2018-01-24 RX ADMIN — Medication 20 MILLIGRAM(S): at 05:09

## 2018-01-24 RX ADMIN — ATORVASTATIN CALCIUM 20 MILLIGRAM(S): 80 TABLET, FILM COATED ORAL at 21:44

## 2018-01-24 RX ADMIN — Medication 1: at 18:30

## 2018-01-24 RX ADMIN — FAMOTIDINE 20 MILLIGRAM(S): 10 INJECTION INTRAVENOUS at 10:00

## 2018-01-24 RX ADMIN — Medication 1: at 13:05

## 2018-01-24 RX ADMIN — Medication 3 MILLILITER(S): at 05:09

## 2018-01-24 RX ADMIN — Medication 100 MILLIGRAM(S): at 17:23

## 2018-01-24 RX ADMIN — INSULIN GLARGINE 8 UNIT(S): 100 INJECTION, SOLUTION SUBCUTANEOUS at 21:45

## 2018-01-24 RX ADMIN — Medication 100 MILLIGRAM(S): at 13:05

## 2018-01-24 RX ADMIN — Medication 10 MILLIGRAM(S): at 00:49

## 2018-01-24 RX ADMIN — Medication 650 MILLIGRAM(S): at 15:56

## 2018-01-24 RX ADMIN — Medication 3 MILLILITER(S): at 23:16

## 2018-01-24 RX ADMIN — Medication 650 MILLIGRAM(S): at 02:40

## 2018-01-24 RX ADMIN — Medication 10 MILLIGRAM(S): at 10:00

## 2018-01-24 RX ADMIN — Medication 81 MILLIGRAM(S): at 10:00

## 2018-01-24 RX ADMIN — MONTELUKAST 10 MILLIGRAM(S): 4 TABLET, CHEWABLE ORAL at 17:39

## 2018-01-24 RX ADMIN — INSULIN GLARGINE 8 UNIT(S): 100 INJECTION, SOLUTION SUBCUTANEOUS at 00:38

## 2018-01-24 RX ADMIN — Medication 10 MILLIGRAM(S): at 20:25

## 2018-01-24 RX ADMIN — APIXABAN 5 MILLIGRAM(S): 2.5 TABLET, FILM COATED ORAL at 00:33

## 2018-01-24 RX ADMIN — BUDESONIDE AND FORMOTEROL FUMARATE DIHYDRATE 2 PUFF(S): 160; 4.5 AEROSOL RESPIRATORY (INHALATION) at 17:24

## 2018-01-24 RX ADMIN — SPIRONOLACTONE 25 MILLIGRAM(S): 25 TABLET, FILM COATED ORAL at 05:09

## 2018-01-24 RX ADMIN — APIXABAN 5 MILLIGRAM(S): 2.5 TABLET, FILM COATED ORAL at 05:46

## 2018-01-24 RX ADMIN — APIXABAN 5 MILLIGRAM(S): 2.5 TABLET, FILM COATED ORAL at 17:24

## 2018-01-24 RX ADMIN — Medication 3 MILLILITER(S): at 10:53

## 2018-01-24 RX ADMIN — Medication 100 MILLIGRAM(S): at 05:09

## 2018-01-24 RX ADMIN — Medication 100 MILLIGRAM(S): at 23:16

## 2018-01-24 RX ADMIN — Medication 100 MILLIGRAM(S): at 20:25

## 2018-01-24 RX ADMIN — BUDESONIDE AND FORMOTEROL FUMARATE DIHYDRATE 2 PUFF(S): 160; 4.5 AEROSOL RESPIRATORY (INHALATION) at 05:10

## 2018-01-24 RX ADMIN — Medication 3 MILLILITER(S): at 17:24

## 2018-01-24 RX ADMIN — TIOTROPIUM BROMIDE 1 CAPSULE(S): 18 CAPSULE ORAL; RESPIRATORY (INHALATION) at 13:05

## 2018-01-24 NOTE — PROCEDURE NOTE - ADDITIONAL PROCEDURE DETAILS
1. Indication for interrogation: 15 beats WCT on tele,  2. Measured data WNL. Sensing and pacing thresholds WNL. Lead impedence stable  3. Pt is not PM dependent  4. Stored data revealed on 1/23//18 one  with  duration 1 second. Review of available EGM c/w NSVT  5. Changes made: none. Normal ICD function

## 2018-01-24 NOTE — CHART NOTE - NSCHARTNOTEFT_GEN_A_CORE
Called by RN for 15 beats nonsustained v-tach.   Pt seen and examined at bedside. Pt reports pain under right breast like muscle spasm that is better than when she came to hospital. Pain is non-radiating. No paresthesias.   Pt states the Tylenol helps her.   Denies any chest pain, SOB, palpitations, headache, lightheadedness, or abdominal pain.     Vital Signs Last 24 Hrs  T(C): 36.4 (23 Jan 2018 21:48), Max: 36.7 (23 Jan 2018 18:20)  T(F): 97.5 (23 Jan 2018 21:48), Max: 98 (23 Jan 2018 18:20)  HR: 90 (24 Jan 2018 02:52) (90 - 109)  BP: 114/68 (24 Jan 2018 02:52) (114/68 - 148/72)  BP(mean): --  RR: 18 (24 Jan 2018 02:52) (18 - 20)  SpO2: 98% (24 Jan 2018 02:52) (95% - 100%)                        12.2   10.0  )-----------( 160      ( 23 Jan 2018 18:47 )             35.0     01-23    141  |  101  |  24<H>  ----------------------------<  161<H>  4.8   |  26  |  1.10    Ca    9.9      23 Jan 2018 18:47  Phos  3.6     01-23  Mg     1.8     01-23    TPro  7.5  /  Alb  4.4  /  TBili  0.4  /  DBili  x   /  AST  20  /  ALT  19  /  AlkPhos  93  01-23        PHYSICAL EXAM:     General: NAD, non-toxic appearance  Neuro: NC, AT, PERRLA, no focal deficits  CV: S1 S2 RRR  Resp: B/L Lungs CTA, nonlabored  Abd: soft, NT, ND, + BS X4 quadrants  Ext: no edema, +PP b/l LE, warm to touch     Assessment & Plan     73 yo female with PMH of Asthma with frequent exacerbation, systolic CHF, s/p AICD, aflutter on eliquis, T2DM, diverticulitis, gout, HTN, HLD, presents here with Asthma exacerbation likely due to viral infection. +Coronavirus on RVP.     Pt now with 15 beats non-sustained ventricular tachycardia, asymptomatic.     Continue to monitor overnight on telemetry  Check electrolytes  Check EKG   EP consult   d/w admitting nocturnist    F/U with primary team in am    Pearl Hart, ANP-BC  [id number]

## 2018-01-24 NOTE — PROGRESS NOTE ADULT - SUBJECTIVE AND OBJECTIVE BOX
Patient is a 72y old  Female who presents with a chief complaint of shortness of breath (23 Jan 2018 23:41)      SUBJECTIVE / OVERNIGHT EVENTS: feeling better, talking in full sentences, refusing steroid IV/po 2/2 fear of elevated BGMs. ptn reassured but still wont agree to steroids. DC planning home chin    MEDICATIONS  (STANDING):  ALBUTerol/ipratropium for Nebulization 3 milliLiter(s) Nebulizer every 6 hours  apixaban 5 milliGRAM(s) Oral every 12 hours  aspirin enteric coated 81 milliGRAM(s) Oral daily  atorvastatin 20 milliGRAM(s) Oral at bedtime  buDESOnide 160 MICROgram(s)/formoterol 4.5 MICROgram(s) Inhaler 2 Puff(s) Inhalation two times a day  dextrose 5%. 1000 milliLiter(s) (50 mL/Hr) IV Continuous <Continuous>  dextrose 50% Injectable 12.5 Gram(s) IV Push once  dextrose 50% Injectable 25 Gram(s) IV Push once  dextrose 50% Injectable 25 Gram(s) IV Push once  docusate sodium 100 milliGRAM(s) Oral three times a day  enalapril 10 milliGRAM(s) Oral two times a day  famotidine    Tablet 20 milliGRAM(s) Oral daily  furosemide    Tablet 20 milliGRAM(s) Oral daily  guaiFENesin   Syrup  (Sugar-Free) 100 milliGRAM(s) Oral every 6 hours  insulin glargine Injectable (LANTUS) 8 Unit(s) SubCutaneous at bedtime  insulin lispro (HumaLOG) corrective regimen sliding scale   SubCutaneous three times a day before meals  insulin lispro (HumaLOG) corrective regimen sliding scale   SubCutaneous at bedtime  montelukast 10 milliGRAM(s) Oral daily  senna 2 Tablet(s) Oral at bedtime  spironolactone 25 milliGRAM(s) Oral daily  tiotropium 18 MICROgram(s) Capsule 1 Capsule(s) Inhalation daily    MEDICATIONS  (PRN):  acetaminophen   Tablet 650 milliGRAM(s) Oral every 6 hours PRN fever, pain, headache  dextrose Gel 1 Dose(s) Oral once PRN Blood Glucose LESS THAN 70 milliGRAM(s)/deciliter  glucagon  Injectable 1 milliGRAM(s) IntraMuscular once PRN Glucose LESS THAN 70 milligrams/deciliter      Vital Signs Last 24 Hrs  T(F): 98.1 (01-25-18 @ 04:18), Max: 98.1 (01-24-18 @ 05:01)  HR: 83 (01-25-18 @ 04:18) (83 - 110)  BP: 108/70 (01-25-18 @ 04:18) (108/70 - 140/83)  RR: 18 (01-25-18 @ 04:18) (18 - 22)  SpO2: 97% (01-25-18 @ 04:18) (96% - 98%)  Telemetry:   CAPILLARY BLOOD GLUCOSE      POCT Blood Glucose.: 199 mg/dL (24 Jan 2018 21:05)  POCT Blood Glucose.: 160 mg/dL (24 Jan 2018 18:26)  POCT Blood Glucose.: 184 mg/dL (24 Jan 2018 12:45)  POCT Blood Glucose.: 102 mg/dL (24 Jan 2018 09:12)    I&O's Summary    23 Jan 2018 07:01  -  24 Jan 2018 07:00  --------------------------------------------------------  IN: 100 mL / OUT: 0 mL / NET: 100 mL    24 Jan 2018 07:01  -  25 Jan 2018 04:43  --------------------------------------------------------  IN: 1260 mL / OUT: 0 mL / NET: 1260 mL        PHYSICAL EXAM:  GENERAL: NAD, well-developed  HEAD:  Atraumatic, Normocephalic  EYES: EOMI, PERRLA, conjunctiva and sclera clear  NECK: Supple, No JVD  CHEST/LUNG: Clear to auscultation bilaterally; No wheeze  HEART: Regular rate and rhythm; No murmurs, rubs, or gallops  ABDOMEN: Soft, Nontender, Nondistended; Bowel sounds present  EXTREMITIES:  2+ Peripheral Pulses, No clubbing, cyanosis, or edema  PSYCH: AAOx3  NEUROLOGY: non-focal  SKIN: No rashes or lesions    LABS:                        11.5   8.6   )-----------( 155      ( 24 Jan 2018 06:48 )             33.2     01-24    141  |  122<H>  |  21  ----------------------------<  71  4.4   |  26  |  1.03    Ca    9.4      24 Jan 2018 06:48  Phos  3.7     01-24  Mg     1.9     01-24    TPro  7.0  /  Alb  3.8  /  TBili  0.4  /  DBili  x   /  AST  19  /  ALT  18  /  AlkPhos  68  01-24      CARDIAC MARKERS ( 24 Jan 2018 00:33 )  x     / 0.01 ng/mL / x     / x     / 3.7 ng/mL  CARDIAC MARKERS ( 23 Jan 2018 18:47 )  x     / 0.02 ng/mL / 231 U/L / x     / 4.9 ng/mL          RADIOLOGY & ADDITIONAL TESTS:    Imaging Personally Reviewed:    Consultant(s) Notes Reviewed:      Care Discussed with Consultants/Other Providers:

## 2018-01-24 NOTE — CONSULT NOTE ADULT - SUBJECTIVE AND OBJECTIVE BOX
PULMONARY CONSULT    Initial HPI on admission:  HPI:  71 yo female with PMH of Asthma with frequent exacerbation, systolic CHF, s/p AICD, aflutter on eliquis, T2DM, diverticulitis, gout, HTN, HLD, presents here with SOB.  Patient states that she has been having SOB and cough for a long time.  She was seen by her pulmonologist who prescribed her azithromycin which she did not start taking until 4 days ago.  She says with azithromycin her symptoms improved slightly.  This morning she went to her cardiologist office.  After returning home she started having worsening of her cough with thick yellow sputum.  She also has been having muscle spasms under her breast both sides.  She denies any fever.  Her daughter is also sick with cough and cold like symptoms.  She does have dyspnea with exertion, but denies orthopnea or PND. (23 Jan 2018 23:41)      BRIEF HOSPITAL COURSE: ***    PAST MEDICAL & SURGICAL HISTORY:  Atrial flutter  Diverticulitis  Cardiomyopathy  Kidney stone  COPD (chronic obstructive pulmonary disease)  Cardiac Pacemaker  HTN - Hypertension  Gout  Diabetes  Congestive Heart Failure  Asthma  S/P cholecystectomy  AICD (Automatic Cardioverter/Defibrillator) Present: inserted in Aug, 2008. Due for battery change in 1 month. ( [employer]) . Inserted by Dr Duffy    Allergies    Coreg (Other)  digoxin (Other; Short breath (Mild to Mod))  penicillins (Hives)  Solu-Medrol (Other)    Intolerances      FAMILY HISTORY:  Family history of brain tumor (Mother)    Social history:     Review of Systems:  CONSTITUTIONAL: No fever, chills, or fatigue  EYES: No eye pain, visual disturbances, or discharge  ENMT:  No difficulty hearing, tinnitus, vertigo; No sinus or throat pain  NECK: No pain or stiffness  RESPIRATORY: Per above  CARDIOVASCULAR: No chest pain, palpitations, dizziness, or leg swelling  GASTROINTESTINAL: No abdominal or epigastric pain. No nausea, vomiting, or hematemesis; No diarrhea or constipation. No melena or hematochezia.  GENITOURINARY: No dysuria, frequency, hematuria, or incontinence  NEUROLOGICAL: No headaches, memory loss, loss of strength, numbness, or tremors  SKIN: No itching, burning, rashes, or lesions   MUSCULOSKELETAL: No joint pain or swelling; No muscle, back, or extremity pain  PSYCHIATRIC: No depression, anxiety, mood swings, or difficulty sleeping      Medications:  MEDICATIONS  (STANDING):  ALBUTerol/ipratropium for Nebulization 3 milliLiter(s) Nebulizer every 6 hours  apixaban 5 milliGRAM(s) Oral every 12 hours  aspirin enteric coated 81 milliGRAM(s) Oral daily  atorvastatin 20 milliGRAM(s) Oral at bedtime  buDESOnide 160 MICROgram(s)/formoterol 4.5 MICROgram(s) Inhaler 2 Puff(s) Inhalation two times a day  dextrose 5%. 1000 milliLiter(s) (50 mL/Hr) IV Continuous <Continuous>  dextrose 50% Injectable 12.5 Gram(s) IV Push once  dextrose 50% Injectable 25 Gram(s) IV Push once  dextrose 50% Injectable 25 Gram(s) IV Push once  docusate sodium 100 milliGRAM(s) Oral three times a day  enalapril 10 milliGRAM(s) Oral two times a day  famotidine    Tablet 20 milliGRAM(s) Oral daily  furosemide    Tablet 20 milliGRAM(s) Oral daily  guaiFENesin   Syrup  (Sugar-Free) 100 milliGRAM(s) Oral every 6 hours  insulin glargine Injectable (LANTUS) 8 Unit(s) SubCutaneous at bedtime  insulin lispro (HumaLOG) corrective regimen sliding scale   SubCutaneous three times a day before meals  insulin lispro (HumaLOG) corrective regimen sliding scale   SubCutaneous at bedtime  senna 2 Tablet(s) Oral at bedtime  spironolactone 25 milliGRAM(s) Oral daily  tiotropium 18 MICROgram(s) Capsule 1 Capsule(s) Inhalation daily    MEDICATIONS  (PRN):  acetaminophen   Tablet 650 milliGRAM(s) Oral every 6 hours PRN fever, pain, headache  dextrose Gel 1 Dose(s) Oral once PRN Blood Glucose LESS THAN 70 milliGRAM(s)/deciliter  glucagon  Injectable 1 milliGRAM(s) IntraMuscular once PRN Glucose LESS THAN 70 milligrams/deciliter            Vital Signs Last 24 Hrs  T(C): 36.7 (24 Jan 2018 05:01), Max: 36.7 (23 Jan 2018 18:20)  T(F): 98.1 (24 Jan 2018 05:01), Max: 98.1 (24 Jan 2018 05:01)  HR: 110 (24 Jan 2018 10:52) (90 - 110)  BP: 111/76 (24 Jan 2018 10:52) (111/76 - 148/72)  BP(mean): --  RR: 22 (24 Jan 2018 10:52) (18 - 22)  SpO2: 98% (24 Jan 2018 10:52) (95% - 100%)      VBG pH 7.38 01-23 @ 18:45  VBG pCO2 48 01-23 @ 18:45  VBG O2 sat 73 01-23 @ 18:45  VBG lactate 2.2 01-23 @ 18:45        01-23 @ 07:01  -  01-24 @ 07:00  --------------------------------------------------------  IN: 100 mL / OUT: 0 mL / NET: 100 mL          LABS:                        11.5   8.6   )-----------( 155      ( 24 Jan 2018 06:48 )             33.2     01-24    141  |  122<H>  |  21  ----------------------------<  71  4.4   |  26  |  1.03    Ca    9.4      24 Jan 2018 06:48  Phos  3.7     01-24  Mg     1.9     01-24    TPro  7.0  /  Alb  3.8  /  TBili  0.4  /  DBili  x   /  AST  19  /  ALT  18  /  AlkPhos  68  01-24      CARDIAC MARKERS ( 24 Jan 2018 00:33 )  x     / 0.01 ng/mL / x     / x     / 3.7 ng/mL  CARDIAC MARKERS ( 23 Jan 2018 18:47 )  x     / 0.02 ng/mL / 231 U/L / x     / 4.9 ng/mL      CAPILLARY BLOOD GLUCOSE      POCT Blood Glucose.: 184 mg/dL (24 Jan 2018 12:45)          Serum Pro-Brain Natriuretic Peptide: 122 pg/mL (01-23-18 @ 18:47)              CULTURES: (if applicable)        Physical Examination:    General: No acute distress.      HEENT: Pupils equal, reactive to light.  Symmetric.    PULM: Clear to auscultation bilaterally, no significant sputum production    CVS: S1, S2    ABD: Soft, nondistended, nontender, normoactive bowel sounds, no masses    EXT: No edema, nontender    SKIN: Warm and well perfused, no rashes noted.    NEURO: Alert, oriented, interactive, nonfocal    RADIOLOGY REVIEWED  CXR:    CT chest:    TTE: PULMONARY CONSULT      HPI: 71 y/o F with PMH of Asthma with frequent exacerbation, respiratory failure 2017 in the setting of Flu/PNA, HFrEF, s/p AICD, aflutter on eliquis, T2DM, diverticulitis, gout, HTN, HLD, presents here with SOB.  Patient states that she has been having SOB and cough for a long time.  She was seen by her pulmonologist who prescribed her azithromycin which she did not start taking until 4 days ago.  She says with azithromycin her symptoms improved slightly.  This morning she went to her cardiologist office.  After returning home she started having worsening of her cough with thick yellow sputum.  She also has been having muscle spasms under her breast both sides.  She denies any fever.  Her daughter is also sick with cough and cold like symptoms.  She does have dyspnea with exertion, but denies orthopnea or PND.        PAST MEDICAL & SURGICAL HISTORY:  Atrial flutter  Diverticulitis  Cardiomyopathy  Kidney stone  COPD (chronic obstructive pulmonary disease)  Cardiac Pacemaker  HTN - Hypertension  Gout  Diabetes  Congestive Heart Failure  Asthma  S/P cholecystectomy  AICD (Automatic Cardioverter/Defibrillator) Present: inserted in Aug, 2008. Due for battery change in 1 month. ( [employer]) . Inserted by Dr Duffy    Allergies    Coreg (Other)  digoxin (Other; Short breath (Mild to Mod))  penicillins (Hives)  Solu-Medrol (Other)    Intolerances      FAMILY HISTORY:  Family history of brain tumor (Mother)    Social history: Never smoker     Review of Systems:  CONSTITUTIONAL: No fever, chills, or fatigue  EYES: No eye pain, visual disturbances, or discharge  ENMT:  No difficulty hearing, tinnitus, vertigo; No sinus or throat pain  NECK: No pain or stiffness  RESPIRATORY: Per above  CARDIOVASCULAR: No chest pain, palpitations, dizziness, or leg swelling  GASTROINTESTINAL: No abdominal or epigastric pain. No nausea, vomiting, or hematemesis; No diarrhea or constipation. No melena or hematochezia.  GENITOURINARY: No dysuria, frequency, hematuria, or incontinence  NEUROLOGICAL: No headaches, memory loss, loss of strength, numbness, or tremors  SKIN: No itching, burning, rashes, or lesions   MUSCULOSKELETAL: No joint pain or swelling; No muscle, back, or extremity pain  PSYCHIATRIC: No depression, anxiety, mood swings, or difficulty sleeping      Medications:  MEDICATIONS  (STANDING):  ALBUTerol/ipratropium for Nebulization 3 milliLiter(s) Nebulizer every 6 hours  apixaban 5 milliGRAM(s) Oral every 12 hours  aspirin enteric coated 81 milliGRAM(s) Oral daily  atorvastatin 20 milliGRAM(s) Oral at bedtime  buDESOnide 160 MICROgram(s)/formoterol 4.5 MICROgram(s) Inhaler 2 Puff(s) Inhalation two times a day  dextrose 5%. 1000 milliLiter(s) (50 mL/Hr) IV Continuous <Continuous>  dextrose 50% Injectable 12.5 Gram(s) IV Push once  dextrose 50% Injectable 25 Gram(s) IV Push once  dextrose 50% Injectable 25 Gram(s) IV Push once  docusate sodium 100 milliGRAM(s) Oral three times a day  enalapril 10 milliGRAM(s) Oral two times a day  famotidine    Tablet 20 milliGRAM(s) Oral daily  furosemide    Tablet 20 milliGRAM(s) Oral daily  guaiFENesin   Syrup  (Sugar-Free) 100 milliGRAM(s) Oral every 6 hours  insulin glargine Injectable (LANTUS) 8 Unit(s) SubCutaneous at bedtime  insulin lispro (HumaLOG) corrective regimen sliding scale   SubCutaneous three times a day before meals  insulin lispro (HumaLOG) corrective regimen sliding scale   SubCutaneous at bedtime  senna 2 Tablet(s) Oral at bedtime  spironolactone 25 milliGRAM(s) Oral daily  tiotropium 18 MICROgram(s) Capsule 1 Capsule(s) Inhalation daily    MEDICATIONS  (PRN):  acetaminophen   Tablet 650 milliGRAM(s) Oral every 6 hours PRN fever, pain, headache  dextrose Gel 1 Dose(s) Oral once PRN Blood Glucose LESS THAN 70 milliGRAM(s)/deciliter  glucagon  Injectable 1 milliGRAM(s) IntraMuscular once PRN Glucose LESS THAN 70 milligrams/deciliter            Vital Signs Last 24 Hrs  T(C): 36.7 (24 Jan 2018 05:01), Max: 36.7 (23 Jan 2018 18:20)  T(F): 98.1 (24 Jan 2018 05:01), Max: 98.1 (24 Jan 2018 05:01)  HR: 110 (24 Jan 2018 10:52) (90 - 110)  BP: 111/76 (24 Jan 2018 10:52) (111/76 - 148/72)  BP(mean): --  RR: 22 (24 Jan 2018 10:52) (18 - 22)  SpO2: 98% (24 Jan 2018 10:52) (95% - 100%) on RA      VBG pH 7.38 01-23 @ 18:45  VBG pCO2 48 01-23 @ 18:45  VBG O2 sat 73 01-23 @ 18:45  VBG lactate 2.2 01-23 @ 18:45        01-23 @ 07:01  -  01-24 @ 07:00  --------------------------------------------------------  IN: 100 mL / OUT: 0 mL / NET: 100 mL          LABS:                        11.5   8.6   )-----------( 155      ( 24 Jan 2018 06:48 )             33.2     01-24    141  |  122<H>  |  21  ----------------------------<  71  4.4   |  26  |  1.03    Ca    9.4      24 Jan 2018 06:48  Phos  3.7     01-24  Mg     1.9     01-24    TPro  7.0  /  Alb  3.8  /  TBili  0.4  /  DBili  x   /  AST  19  /  ALT  18  /  AlkPhos  68  01-24      CARDIAC MARKERS ( 24 Jan 2018 00:33 )  x     / 0.01 ng/mL / x     / x     / 3.7 ng/mL  CARDIAC MARKERS ( 23 Jan 2018 18:47 )  x     / 0.02 ng/mL / 231 U/L / x     / 4.9 ng/mL      CAPILLARY BLOOD GLUCOSE      POCT Blood Glucose.: 184 mg/dL (24 Jan 2018 12:45)          Serum Pro-Brain Natriuretic Peptide: 122 pg/mL (01-23-18 @ 18:47)              CULTURES: (if applicable)        Physical Examination:    General: No acute distress.      HEENT: Pupils equal, reactive to light.  Symmetric.    PULM: Mild exp wheeze    CVS: S1, S2 RRR    ABD: Soft, nondistended, nontender, normoactive bowel sounds, no masses    EXT: No edema, nontender    SKIN: Warm and well perfused, no rashes noted.    NEURO: Alert, oriented, interactive, nonfocal    RADIOLOGY REVIEWED  CXR: Grossly clear PULMONARY CONSULT      HPI: 73 y/o F with PMH of Asthma with frequent exacerbation, respiratory failure 2017 in the setting of Flu/PNA, HFrEF, s/p AICD, aflutter on eliquis, T2DM, diverticulitis, gout, HTN, HLD, presents here with SOB.  Patient states that she has been having SOB and cough for a long time.  She was seen by her pulmonologist who prescribed her azithromycin which she did not start taking until 4 days ago.  She says with azithromycin her symptoms improved slightly.  This morning she went to her cardiologist office.  After returning home she started having worsening of her cough with thick yellow sputum.  She also has been having muscle spasms under her breast both sides.  She denies any fever.  Her daughter is also sick with cough and cold like symptoms.  She does have dyspnea with exertion, but denies orthopnea or PND.    Outpt pulm: Dr. Kirill Daily         PAST MEDICAL & SURGICAL HISTORY:  Atrial flutter  Diverticulitis  Cardiomyopathy  Kidney stone  COPD (chronic obstructive pulmonary disease)  Cardiac Pacemaker  HTN - Hypertension  Gout  Diabetes  Congestive Heart Failure  Asthma  S/P cholecystectomy  AICD (Automatic Cardioverter/Defibrillator) Present: inserted in Aug, 2008. Due for battery change in 1 month. ( [employer]) . Inserted by Dr Duffy    Allergies    Coreg (Other)  digoxin (Other; Short breath (Mild to Mod))  penicillins (Hives)  Solu-Medrol (Other)    Intolerances      FAMILY HISTORY:  Family history of brain tumor (Mother)    Social history: Never smoker     Review of Systems:  CONSTITUTIONAL: No fever, chills, or fatigue  EYES: No eye pain, visual disturbances, or discharge  ENMT:  No difficulty hearing, tinnitus, vertigo; No sinus or throat pain  NECK: No pain or stiffness  RESPIRATORY: Per above  CARDIOVASCULAR: No chest pain, palpitations, dizziness, or leg swelling  GASTROINTESTINAL: No abdominal or epigastric pain. No nausea, vomiting, or hematemesis; No diarrhea or constipation. No melena or hematochezia.  GENITOURINARY: No dysuria, frequency, hematuria, or incontinence  NEUROLOGICAL: No headaches, memory loss, loss of strength, numbness, or tremors  SKIN: No itching, burning, rashes, or lesions   MUSCULOSKELETAL: No joint pain or swelling; No muscle, back, or extremity pain  PSYCHIATRIC: No depression, anxiety, mood swings, or difficulty sleeping      Medications:  MEDICATIONS  (STANDING):  ALBUTerol/ipratropium for Nebulization 3 milliLiter(s) Nebulizer every 6 hours  apixaban 5 milliGRAM(s) Oral every 12 hours  aspirin enteric coated 81 milliGRAM(s) Oral daily  atorvastatin 20 milliGRAM(s) Oral at bedtime  buDESOnide 160 MICROgram(s)/formoterol 4.5 MICROgram(s) Inhaler 2 Puff(s) Inhalation two times a day  dextrose 5%. 1000 milliLiter(s) (50 mL/Hr) IV Continuous <Continuous>  dextrose 50% Injectable 12.5 Gram(s) IV Push once  dextrose 50% Injectable 25 Gram(s) IV Push once  dextrose 50% Injectable 25 Gram(s) IV Push once  docusate sodium 100 milliGRAM(s) Oral three times a day  enalapril 10 milliGRAM(s) Oral two times a day  famotidine    Tablet 20 milliGRAM(s) Oral daily  furosemide    Tablet 20 milliGRAM(s) Oral daily  guaiFENesin   Syrup  (Sugar-Free) 100 milliGRAM(s) Oral every 6 hours  insulin glargine Injectable (LANTUS) 8 Unit(s) SubCutaneous at bedtime  insulin lispro (HumaLOG) corrective regimen sliding scale   SubCutaneous three times a day before meals  insulin lispro (HumaLOG) corrective regimen sliding scale   SubCutaneous at bedtime  senna 2 Tablet(s) Oral at bedtime  spironolactone 25 milliGRAM(s) Oral daily  tiotropium 18 MICROgram(s) Capsule 1 Capsule(s) Inhalation daily    MEDICATIONS  (PRN):  acetaminophen   Tablet 650 milliGRAM(s) Oral every 6 hours PRN fever, pain, headache  dextrose Gel 1 Dose(s) Oral once PRN Blood Glucose LESS THAN 70 milliGRAM(s)/deciliter  glucagon  Injectable 1 milliGRAM(s) IntraMuscular once PRN Glucose LESS THAN 70 milligrams/deciliter            Vital Signs Last 24 Hrs  T(C): 36.7 (24 Jan 2018 05:01), Max: 36.7 (23 Jan 2018 18:20)  T(F): 98.1 (24 Jan 2018 05:01), Max: 98.1 (24 Jan 2018 05:01)  HR: 110 (24 Jan 2018 10:52) (90 - 110)  BP: 111/76 (24 Jan 2018 10:52) (111/76 - 148/72)  BP(mean): --  RR: 22 (24 Jan 2018 10:52) (18 - 22)  SpO2: 98% (24 Jan 2018 10:52) (95% - 100%) on RA      VBG pH 7.38 01-23 @ 18:45  VBG pCO2 48 01-23 @ 18:45  VBG O2 sat 73 01-23 @ 18:45  VBG lactate 2.2 01-23 @ 18:45        01-23 @ 07:01  -  01-24 @ 07:00  --------------------------------------------------------  IN: 100 mL / OUT: 0 mL / NET: 100 mL          LABS:                        11.5   8.6   )-----------( 155      ( 24 Jan 2018 06:48 )             33.2     01-24    141  |  122<H>  |  21  ----------------------------<  71  4.4   |  26  |  1.03    Ca    9.4      24 Jan 2018 06:48  Phos  3.7     01-24  Mg     1.9     01-24    TPro  7.0  /  Alb  3.8  /  TBili  0.4  /  DBili  x   /  AST  19  /  ALT  18  /  AlkPhos  68  01-24      CARDIAC MARKERS ( 24 Jan 2018 00:33 )  x     / 0.01 ng/mL / x     / x     / 3.7 ng/mL  CARDIAC MARKERS ( 23 Jan 2018 18:47 )  x     / 0.02 ng/mL / 231 U/L / x     / 4.9 ng/mL      CAPILLARY BLOOD GLUCOSE      POCT Blood Glucose.: 184 mg/dL (24 Jan 2018 12:45)          Serum Pro-Brain Natriuretic Peptide: 122 pg/mL (01-23-18 @ 18:47)              CULTURES: (if applicable)        Physical Examination:    General: No acute distress.      HEENT: Pupils equal, reactive to light.  Symmetric.    PULM: Mild exp wheeze    CVS: S1, S2 RRR    ABD: Soft, nondistended, nontender, normoactive bowel sounds, no masses    EXT: No edema, nontender    SKIN: Warm and well perfused, no rashes noted.    NEURO: Alert, oriented, interactive, nonfocal    RADIOLOGY REVIEWED  CXR: Grossly clear

## 2018-01-24 NOTE — PROGRESS NOTE ADULT - PROBLEM SELECTOR PLAN 5
s/p AICD  check outpatient echocardiogram  monitor ins/outs  c/w home dose lasix  trend cardiac enzymes given nonspecific complains of muscle pain under breast b/l

## 2018-01-24 NOTE — PROGRESS NOTE ADULT - PROBLEM SELECTOR PLAN 2
symptomatic management  c/w duonebs  c/w guaifenessin  c/w acetaminophen  hold off IVF given underlying heart failure

## 2018-01-24 NOTE — PROGRESS NOTE ADULT - ASSESSMENT
73 yo female with PMH of Asthma with frequent exacerbation, systolic CHF, s/p AICD, aflutter on eliquis, T2DM, diverticulitis, gout, HTN, HLD, presents here with SOB.

## 2018-01-24 NOTE — PROGRESS NOTE ADULT - PROBLEM SELECTOR PLAN 1
Patient with asthma exacerbation likely due to underlying viral infection  c/w radha for now  no role for ABx  DC planning

## 2018-01-24 NOTE — PROVIDER CONTACT NOTE (OTHER) - ASSESSMENT
PT asleep, easily arousable. Pt denies chest pain, palpitations and sob. VS /68 HR 90 resp 18 POX 98%.

## 2018-01-25 ENCOUNTER — TRANSCRIPTION ENCOUNTER (OUTPATIENT)
Age: 73
End: 2018-01-25

## 2018-01-25 DIAGNOSIS — E11.65 TYPE 2 DIABETES MELLITUS WITH HYPERGLYCEMIA: ICD-10-CM

## 2018-01-25 LAB
ANION GAP SERPL CALC-SCNC: 12 MMOL/L — SIGNIFICANT CHANGE UP (ref 5–17)
BUN SERPL-MCNC: 23 MG/DL — SIGNIFICANT CHANGE UP (ref 7–23)
CALCIUM SERPL-MCNC: 9.9 MG/DL — SIGNIFICANT CHANGE UP (ref 8.4–10.5)
CHLORIDE SERPL-SCNC: 99 MMOL/L — SIGNIFICANT CHANGE UP (ref 96–108)
CO2 SERPL-SCNC: 27 MMOL/L — SIGNIFICANT CHANGE UP (ref 22–31)
CREAT SERPL-MCNC: 1.09 MG/DL — SIGNIFICANT CHANGE UP (ref 0.5–1.3)
GLUCOSE BLDC GLUCOMTR-MCNC: 119 MG/DL — HIGH (ref 70–99)
GLUCOSE BLDC GLUCOMTR-MCNC: 245 MG/DL — HIGH (ref 70–99)
GLUCOSE BLDC GLUCOMTR-MCNC: 267 MG/DL — HIGH (ref 70–99)
GLUCOSE BLDC GLUCOMTR-MCNC: 272 MG/DL — HIGH (ref 70–99)
GLUCOSE SERPL-MCNC: 125 MG/DL — HIGH (ref 70–99)
HCT VFR BLD CALC: 35.3 % — SIGNIFICANT CHANGE UP (ref 34.5–45)
HGB BLD-MCNC: 12.2 G/DL — SIGNIFICANT CHANGE UP (ref 11.5–15.5)
IGE SERPL-ACNC: 69 IU/ML — SIGNIFICANT CHANGE UP (ref 0–100)
MCHC RBC-ENTMCNC: 34.6 GM/DL — SIGNIFICANT CHANGE UP (ref 32–36)
MCHC RBC-ENTMCNC: 34.7 PG — HIGH (ref 27–34)
MCV RBC AUTO: 100 FL — SIGNIFICANT CHANGE UP (ref 80–100)
PLATELET # BLD AUTO: 166 K/UL — SIGNIFICANT CHANGE UP (ref 150–400)
POTASSIUM SERPL-MCNC: 4.3 MMOL/L — SIGNIFICANT CHANGE UP (ref 3.5–5.3)
POTASSIUM SERPL-SCNC: 4.3 MMOL/L — SIGNIFICANT CHANGE UP (ref 3.5–5.3)
PROCALCITONIN SERPL-MCNC: 0.09 NG/ML — HIGH (ref 0–0.04)
RBC # BLD: 3.53 M/UL — LOW (ref 3.8–5.2)
RBC # FLD: 12.6 % — SIGNIFICANT CHANGE UP (ref 10.3–14.5)
SODIUM SERPL-SCNC: 138 MMOL/L — SIGNIFICANT CHANGE UP (ref 135–145)
WBC # BLD: 8.6 K/UL — SIGNIFICANT CHANGE UP (ref 3.8–10.5)
WBC # FLD AUTO: 8.6 K/UL — SIGNIFICANT CHANGE UP (ref 3.8–10.5)

## 2018-01-25 RX ORDER — INSULIN LISPRO 100/ML
3 VIAL (ML) SUBCUTANEOUS
Qty: 0 | Refills: 0 | Status: DISCONTINUED | OUTPATIENT
Start: 2018-01-25 | End: 2018-01-26

## 2018-01-25 RX ORDER — INSULIN GLARGINE 100 [IU]/ML
14 INJECTION, SOLUTION SUBCUTANEOUS AT BEDTIME
Qty: 0 | Refills: 0 | Status: DISCONTINUED | OUTPATIENT
Start: 2018-01-25 | End: 2018-01-26

## 2018-01-25 RX ADMIN — Medication 40 MILLIGRAM(S): at 07:58

## 2018-01-25 RX ADMIN — Medication 3 MILLILITER(S): at 11:48

## 2018-01-25 RX ADMIN — MONTELUKAST 10 MILLIGRAM(S): 4 TABLET, CHEWABLE ORAL at 10:50

## 2018-01-25 RX ADMIN — MAGNESIUM HYDROXIDE 30 MILLILITER(S): 400 TABLET, CHEWABLE ORAL at 00:01

## 2018-01-25 RX ADMIN — Medication 100 MILLIGRAM(S): at 17:16

## 2018-01-25 RX ADMIN — APIXABAN 5 MILLIGRAM(S): 2.5 TABLET, FILM COATED ORAL at 05:35

## 2018-01-25 RX ADMIN — Medication 3 MILLILITER(S): at 05:35

## 2018-01-25 RX ADMIN — Medication 100 MILLIGRAM(S): at 13:28

## 2018-01-25 RX ADMIN — Medication 100 MILLIGRAM(S): at 23:49

## 2018-01-25 RX ADMIN — Medication 100 MILLIGRAM(S): at 11:48

## 2018-01-25 RX ADMIN — APIXABAN 5 MILLIGRAM(S): 2.5 TABLET, FILM COATED ORAL at 17:16

## 2018-01-25 RX ADMIN — Medication 20 MILLIGRAM(S): at 05:35

## 2018-01-25 RX ADMIN — Medication 10 MILLIGRAM(S): at 10:50

## 2018-01-25 RX ADMIN — TIOTROPIUM BROMIDE 1 CAPSULE(S): 18 CAPSULE ORAL; RESPIRATORY (INHALATION) at 10:50

## 2018-01-25 RX ADMIN — BUDESONIDE AND FORMOTEROL FUMARATE DIHYDRATE 2 PUFF(S): 160; 4.5 AEROSOL RESPIRATORY (INHALATION) at 17:17

## 2018-01-25 RX ADMIN — Medication 3 UNIT(S): at 18:30

## 2018-01-25 RX ADMIN — INSULIN GLARGINE 14 UNIT(S): 100 INJECTION, SOLUTION SUBCUTANEOUS at 22:21

## 2018-01-25 RX ADMIN — Medication 3 MILLILITER(S): at 17:16

## 2018-01-25 RX ADMIN — Medication 10 MILLIGRAM(S): at 22:22

## 2018-01-25 RX ADMIN — ATORVASTATIN CALCIUM 20 MILLIGRAM(S): 80 TABLET, FILM COATED ORAL at 22:22

## 2018-01-25 RX ADMIN — Medication 3: at 18:29

## 2018-01-25 RX ADMIN — SPIRONOLACTONE 25 MILLIGRAM(S): 25 TABLET, FILM COATED ORAL at 05:35

## 2018-01-25 RX ADMIN — Medication 3 MILLILITER(S): at 23:49

## 2018-01-25 RX ADMIN — Medication 3: at 13:26

## 2018-01-25 RX ADMIN — FAMOTIDINE 20 MILLIGRAM(S): 10 INJECTION INTRAVENOUS at 10:50

## 2018-01-25 RX ADMIN — Medication 100 MILLIGRAM(S): at 05:34

## 2018-01-25 RX ADMIN — Medication 81 MILLIGRAM(S): at 10:50

## 2018-01-25 RX ADMIN — BUDESONIDE AND FORMOTEROL FUMARATE DIHYDRATE 2 PUFF(S): 160; 4.5 AEROSOL RESPIRATORY (INHALATION) at 05:35

## 2018-01-25 NOTE — CONSULT NOTE ADULT - ASSESSMENT
71 y/o Type 2 diabetes treated with Lantus insulin 8 units nightly, HbA1c 8.2%. Patient admitted with asthma exacerbation Started on high dose steroids with taper. Patient concerned about glycemic control.    While in hospital will increase lantus to 14 units, and start humalog 3 units per meal with scale.    Can D/C on lantus insulin 14 units taper to 8 units and prandin 1 mg before meals if FS above 150 mg/dL before the meals, until she is done with the prednisone.
71 yo female with PMH of Asthma with frequent exacerbation, systolic CHF, s/p AICD, aflutter on eliquis, T2DM, diverticulitis, gout, HTN, HLD, presents here with SOB.  Patient states that she has been having SOB and cough for a long time.  She was seen by her pulmonologist who prescribed her azithromycin which she did not start taking until 4 days ago.  She says with azithromycin her symptoms improved slightly.  This morning she went to her cardiologist office.  After returning home she started having worsening of her cough with thick yellow sputum.  She also has been having muscle spasms under her breast both sides.  She denies any fever.  Her daughter is also sick with cough and cold like symptoms.  She does have dyspnea with exertion, but denies orthopnea or PND. (23 Jan 2018 23:41)    ER vitals:  T 98, P 108, /78.  RVP (+) coronavirus.  Chest xray no consolidations.  Pt is off antibiotics.    {[id number]}    {[id number]} Problem/Plan - 1:  ·  Problem: Coronavirus infection.      - No role for antivirals.  Cont supportive care, cont duonebs, guiafenisin, tylenol.  Pt started on prednisone    - Can monitor patient off antibiotics.  No bacterial pneumonia.  PCT with mild elevation at 0.09, but clinically do not suspect pna.    {[id number]}    Will follow,    Yumiko Crockett  [phone]
71 y/o F with PMH of uncontrolled asthma (on oral steroids >5x per year), respiratory failure 2017 in the setting of Flu/PNA, HFrEF, s/p AICD, aflutter on eliquis, T2DM, diverticulitis, gout, HTN, HLD, presents here with SOB.  Patient states that she has been having SOB and cough for a long time.  She was seen by her pulmonologist who prescribed her azithromycin which she did not start taking until 4 days ago.  She says with azithromycin her symptoms improved slightly.  This morning she went to her cardiologist office.  After returning home she started having worsening of her cough with thick yellow sputum.

## 2018-01-25 NOTE — PROGRESS NOTE ADULT - PROBLEM SELECTOR PLAN 3
check A1c  sliding scale  c/w home insulin regimen. endo called to advise Insulin management at home while on steroids

## 2018-01-25 NOTE — CONSULT NOTE ADULT - PROBLEM SELECTOR RECOMMENDATION 9
As Above
2nd coronovirus   -Sputum is light yellow/white per patient. Completed 4 days of Azithromycin as outpt. Cough much improved per patient  -No evidence of PNA on CXR  -Check procalcitonin in AM  -Doubt bacterial process, observe off abx  -Duoneb q6, Symbicort BID, Spiriva qd (home meds)  -Start Singulair 10mg qd   -Check IgE level in AM   -Patient is mildly bronchospastic but not hypoxic. She is reluctant to start prednisone bc she becomes very hyperglycemic. Would hold on steroids at this point  -Robitussin PRN  -Patient refusing Mucinex (bad reaction in the past)  -Likely d/c planning in AM

## 2018-01-25 NOTE — PROGRESS NOTE ADULT - PROBLEM SELECTOR PLAN 2
Has The Growth Been Previously Biopsied?: has been previously biopsied Body Location Override (Optional): Right upper lip symptomatic management  c/w duonebs  c/w guaifenessin  c/w acetaminophen  hold off IVF given underlying heart failure

## 2018-01-25 NOTE — DISCHARGE NOTE ADULT - MEDICATION SUMMARY - MEDICATIONS TO TAKE
I will START or STAY ON the medications listed below when I get home from the hospital:    predniSONE 10 mg oral tablet  -- 2 tab (20mG) by mouth once a day for 2 days, then  1 tab (10mG) by mouth once a day for 2 days, then STOP   -- Indication: For Asthma exacerbation    spironolactone 25 mg oral tablet  -- 1 tab(s) by mouth once a day  -- Indication: For Congestive Heart Failure    aspirin 81 mg oral delayed release tablet  -- 1 tab(s) by mouth once a day  -- Indication: For Heart    enalapril  -- 15 milligram(s) by mouth once a day in the morning  -- Indication: For Congestive Heart Failure    enalapril  -- 10 milligram(s) by mouth once a day in the evening  -- Indication: For Congestive Heart Failure    apixaban 5 mg oral tablet  -- 1 tab(s) by mouth every 12 hours  -- Indication: For Atrial flutter    Prandin 0.5 mg oral tablet  -- 1 tab(s) by mouth 3 times a day (before meals) if fingerstick greater than 150.  2 tab(s) by mouth 3 times a day (before meals) if fingerstick greater than 220.  -- Indication: For Diabetes    Lantus 100 units/mL subcutaneous solution  -- unit(s) subcutaneous once a day (at bedtime) as follows:  When on 20mG Prednisone - take 10 units Lantus - on 1/27 amd 1/28  When on 10mG Prednisone - take 8 units Lantus - on 1/29 and 1/30  Continue taking your home dose of 8 units Lantus thereafter    -- Indication: For Atrial flutter    ZyrTEC 10 mg oral tablet  -- 1 tab(s) by mouth once a day  -- Indication: For Asthma exacerbation    atorvastatin 20 mg oral tablet  -- 1 tab(s) by mouth once a day (at bedtime)  -- Indication: For Cholesterol    Ventolin HFA 90 mcg/inh inhalation aerosol  -- 2 puff(s) inhaled 4 times a day, As Needed  -- Indication: For Asthma exacerbation    tiotropium 18 mcg inhalation capsule  -- 1 cap(s) inhaled once a day  -- Indication: For Asthma exacerbation    budesonide-formoterol 160 mcg-4.5 mcg/inh inhalation aerosol  -- 2  inhaled 2 times a day  -- Indication: For Asthma exacerbation    furosemide 40 mg oral tablet  -- 1 tab(s) by mouth once a day  -- Indication: For Congestive Heart Failure    guaiFENesin 100 mg/5 mL oral liquid  -- 5 milliliter(s) by mouth every 6 hours, As Needed (sugar free)  -- Indication: For Asthma exacerbation    Pepcid AC 10 mg oral tablet  -- 1 tab(s) by mouth once a day  -- Indication: For Stomach    senna oral tablet  -- 2 tab(s) by mouth once a day (at bedtime)  -- Indication: For Bowel regimen    docusate sodium 100 mg oral capsule  -- 1 cap(s) by mouth 3 times a day  -- Indication: For Bowel regimen    montelukast 10 mg oral tablet  -- 1 tab(s) by mouth once a day  -- Indication: For Asthma exacerbation

## 2018-01-25 NOTE — CONSULT NOTE ADULT - PROBLEM SELECTOR PROBLEM 1
Uncontrolled diabetes mellitus type 2 without complications, unspecified long term insulin use status
Asthma exacerbation

## 2018-01-25 NOTE — DISCHARGE NOTE ADULT - PATIENT PORTAL LINK FT
“You can access the Follow[facility] Patient Portal, offered by [facility], by registering with the following website: [email or web address]”

## 2018-01-25 NOTE — PROGRESS NOTE ADULT - PROBLEM SELECTOR PLAN 1
2nd coronovirus   -Sputum is light yellow/white per patient. Completed 4 days of Azithromycin as outpt. Cough much improved per patient  -Patient now agreeable to short prednisone course as outlined  -No evidence of PNA on CXR  -Procalcitonin minimal, observe off abx  -IgE level normal   -Robitussin PRN  -Patient refusing Mucinex (bad reaction in the past)  -d/c planning

## 2018-01-25 NOTE — DISCHARGE NOTE ADULT - CARE PLAN
Principal Discharge DX:	Asthma exacerbation  Goal:	Patient will breath better  Assessment and plan of treatment:	Avoid environmental allergens and asthma triggers.  Participate in physical activity as tolerated.  Seek immediate medical attention if the shortness of breath does not improve with asthma treatments, or if you experience chest pain or palpitations.  Call an ambulance if your lips or nail beds become a bluish color.  Secondary Diagnosis:	Atrial flutter  Assessment and plan of treatment:	Atrial flutter is a heart rhythm problem which increases the risk of stroke and heat attack.  It helps if you control your blood pressure, avoid alcohol, cut down on caffeine, get treatment for your thyroid if it is overactive, and perform moderate exercise in consultation with your Primary Care Provider.  Call your doctor if you experience chest tightness/pain, lightheadedness, loss of consciousness, shortness of breath (especially with exercise), feel your heart racing or beating unusually, frequent or abnormal bleeding.  It is important to take all your heart medications as prescribed.  Secondary Diagnosis:	Congestive Heart Failure  Assessment and plan of treatment:	Take all medications as prescribed.  Stop smoking if you currently smoke, and avoid high altitudes.  Weigh yourself daily.  If you gain 3lbs in 3 days, or 5lbs in a week call your Health Care Provider.  Eat a low sodium diet.  If you have pulmonary hypertension and you are a female of child bearing age, talk to your caregiver about using birth control pills or getting pregnant.  Call your Health Care Provider if you have any swelling or increased swelling in your feet, ankles, and/or stomach.  If you experience dizziness, chest pain, or shortness of breath, seek immediate medical attention.  Secondary Diagnosis:	Diabetes  Assessment and plan of treatment:	Make sure you get your HgA1c checked every three months.  If you take oral diabetes medications, check your blood glucose at least two times a day.  If you take short-acting insulin, check your blood glucose before meals and at bedtime.  It's important not to skip any meals.  Keep a log of your blood glucose results and always take it with you to your doctor appointments.  Keep a list of your current medications including over the counter medications and bring this medication list with you to all your doctor appointments.  If you have not seen your ophthalmologist this year, call for appointment.  Check your feet daily for redness, sores, or openings.  Do not self treat.  If there is no improvement in two days, call your primary care physician for an appointment.    HgA1c this admission was 8.2.  Keep appointment at the doctors office on February 1st.

## 2018-01-25 NOTE — DISCHARGE NOTE ADULT - ADDITIONAL INSTRUCTIONS
Keep appointment at the doctors office on February 1st.  Follow up with your Pulmonologist within 1 week.  Follow up with your Cardiologist within 1-2 weeks.

## 2018-01-25 NOTE — PROGRESS NOTE ADULT - ASSESSMENT
73 y/o F with PMH of uncontrolled asthma (on oral steroids >5x per year), respiratory failure 2017 in the setting of Flu/PNA, HFrEF, s/p AICD, aflutter on eliquis, T2DM, diverticulitis, gout, HTN, HLD, presents here with SOB.  Patient states that she has been having SOB and cough for a long time.  She was seen by her pulmonologist who prescribed her azithromycin which she did not start taking until 4 days ago.  She says with azithromycin her symptoms improved slightly.  This morning she went to her cardiologist office.  After returning home she started having worsening of her cough with thick yellow sputum.

## 2018-01-25 NOTE — PROGRESS NOTE ADULT - SUBJECTIVE AND OBJECTIVE BOX
Follow-up Pulm Progress Note    No new respiratory events overnight.  Denies SOB/CP.   96% on RA      Medications:  MEDICATIONS  (STANDING):  ALBUTerol/ipratropium for Nebulization 3 milliLiter(s) Nebulizer every 6 hours  apixaban 5 milliGRAM(s) Oral every 12 hours  aspirin enteric coated 81 milliGRAM(s) Oral daily  atorvastatin 20 milliGRAM(s) Oral at bedtime  buDESOnide 160 MICROgram(s)/formoterol 4.5 MICROgram(s) Inhaler 2 Puff(s) Inhalation two times a day  dextrose 5%. 1000 milliLiter(s) (50 mL/Hr) IV Continuous <Continuous>  dextrose 50% Injectable 12.5 Gram(s) IV Push once  dextrose 50% Injectable 25 Gram(s) IV Push once  dextrose 50% Injectable 25 Gram(s) IV Push once  docusate sodium 100 milliGRAM(s) Oral three times a day  enalapril 10 milliGRAM(s) Oral two times a day  famotidine    Tablet 20 milliGRAM(s) Oral daily  furosemide    Tablet 20 milliGRAM(s) Oral daily  guaiFENesin   Syrup  (Sugar-Free) 100 milliGRAM(s) Oral every 6 hours  insulin glargine Injectable (LANTUS) 8 Unit(s) SubCutaneous at bedtime  insulin lispro (HumaLOG) corrective regimen sliding scale   SubCutaneous three times a day before meals  insulin lispro (HumaLOG) corrective regimen sliding scale   SubCutaneous at bedtime  montelukast 10 milliGRAM(s) Oral daily  predniSONE   Tablet 40 milliGRAM(s) Oral daily  senna 2 Tablet(s) Oral at bedtime  spironolactone 25 milliGRAM(s) Oral daily  tiotropium 18 MICROgram(s) Capsule 1 Capsule(s) Inhalation daily    MEDICATIONS  (PRN):  acetaminophen   Tablet 650 milliGRAM(s) Oral every 6 hours PRN fever, pain, headache  dextrose Gel 1 Dose(s) Oral once PRN Blood Glucose LESS THAN 70 milliGRAM(s)/deciliter  glucagon  Injectable 1 milliGRAM(s) IntraMuscular once PRN Glucose LESS THAN 70 milligrams/deciliter          Vital Signs Last 24 Hrs  T(C): 36.4 (25 Jan 2018 14:12), Max: 36.7 (25 Jan 2018 04:18)  T(F): 97.6 (25 Jan 2018 14:12), Max: 98.1 (25 Jan 2018 04:18)  HR: 103 (25 Jan 2018 14:12) (83 - 103)  BP: 125/78 (25 Jan 2018 14:12) (108/70 - 144/84)  BP(mean): --  RR: 17 (25 Jan 2018 14:12) (17 - 18)  SpO2: 94% (25 Jan 2018 14:12) (94% - 97%) on RA      VBG pH 7.38 01-23 @ 18:45    VBG pCO2 48 01-23 @ 18:45    VBG O2 sat 73 01-23 @ 18:45    VBG lactate 2.2 01-23 @ 18:45      01-24 @ 07:01  -  01-25 @ 07:00  --------------------------------------------------------  IN: 1380 mL / OUT: 0 mL / NET: 1380 mL          LABS:                        12.2   8.6   )-----------( 166      ( 25 Jan 2018 07:11 )             35.3     01-25    138  |  99  |  23  ----------------------------<  125<H>  4.3   |  27  |  1.09    Ca    9.9      25 Jan 2018 07:11  Phos  3.7     01-24  Mg     1.9     01-24    TPro  7.0  /  Alb  3.8  /  TBili  0.4  /  DBili  x   /  AST  19  /  ALT  18  /  AlkPhos  68  01-24      CARDIAC MARKERS ( 24 Jan 2018 00:33 )  x     / 0.01 ng/mL / x     / x     / 3.7 ng/mL  CARDIAC MARKERS ( 23 Jan 2018 18:47 )  x     / 0.02 ng/mL / 231 U/L / x     / 4.9 ng/mL      CAPILLARY BLOOD GLUCOSE      POCT Blood Glucose.: 267 mg/dL (25 Jan 2018 13:21)        Procalcitonin, Serum: 0.09 ng/mL (01-25-18 @ 07:11)    Serum Pro-Brain Natriuretic Peptide: 122 pg/mL (01-23-18 @ 18:47)                CULTURES: (if applicable)  Culture Results:   No growth to date. (01-23 @ 21:45)  Culture Results:   No growth to date. (01-23 @ 21:45)    Most recent blood culture -- 01-23 @ 21:45   -- -- .Blood Blood 01-23 @ 21:45        Physical Examination:  PULM: Mild exp wheeze  CVS: S1, S2 heard    RADIOLOGY REVIEWED  CXR: grossly clear

## 2018-01-25 NOTE — DISCHARGE NOTE ADULT - PLAN OF CARE
Patient will breath better Avoid environmental allergens and asthma triggers.  Participate in physical activity as tolerated.  Seek immediate medical attention if the shortness of breath does not improve with asthma treatments, or if you experience chest pain or palpitations.  Call an ambulance if your lips or nail beds become a bluish color. Atrial flutter is a heart rhythm problem which increases the risk of stroke and heat attack.  It helps if you control your blood pressure, avoid alcohol, cut down on caffeine, get treatment for your thyroid if it is overactive, and perform moderate exercise in consultation with your Primary Care Provider.  Call your doctor if you experience chest tightness/pain, lightheadedness, loss of consciousness, shortness of breath (especially with exercise), feel your heart racing or beating unusually, frequent or abnormal bleeding.  It is important to take all your heart medications as prescribed. Take all medications as prescribed.  Stop smoking if you currently smoke, and avoid high altitudes.  Weigh yourself daily.  If you gain 3lbs in 3 days, or 5lbs in a week call your Health Care Provider.  Eat a low sodium diet.  If you have pulmonary hypertension and you are a female of child bearing age, talk to your caregiver about using birth control pills or getting pregnant.  Call your Health Care Provider if you have any swelling or increased swelling in your feet, ankles, and/or stomach.  If you experience dizziness, chest pain, or shortness of breath, seek immediate medical attention. Make sure you get your HgA1c checked every three months.  If you take oral diabetes medications, check your blood glucose at least two times a day.  If you take short-acting insulin, check your blood glucose before meals and at bedtime.  It's important not to skip any meals.  Keep a log of your blood glucose results and always take it with you to your doctor appointments.  Keep a list of your current medications including over the counter medications and bring this medication list with you to all your doctor appointments.  If you have not seen your ophthalmologist this year, call for appointment.  Check your feet daily for redness, sores, or openings.  Do not self treat.  If there is no improvement in two days, call your primary care physician for an appointment.    HgA1c this admission was 8.2.  Keep appointment at the doctors office on February 1st.

## 2018-01-25 NOTE — PROGRESS NOTE ADULT - SUBJECTIVE AND OBJECTIVE BOX
Patient is a 72y old  Female who presents with a chief complaint of shortness of breath (23 Jan 2018 23:41)      SUBJECTIVE / OVERNIGHT EVENTS: now agreeing to po steroids as long as has instructions how to manage her elevated BGMs at home    MEDICATIONS  (STANDING):  ALBUTerol/ipratropium for Nebulization 3 milliLiter(s) Nebulizer every 6 hours  apixaban 5 milliGRAM(s) Oral every 12 hours  aspirin enteric coated 81 milliGRAM(s) Oral daily  atorvastatin 20 milliGRAM(s) Oral at bedtime  buDESOnide 160 MICROgram(s)/formoterol 4.5 MICROgram(s) Inhaler 2 Puff(s) Inhalation two times a day  dextrose 5%. 1000 milliLiter(s) (50 mL/Hr) IV Continuous <Continuous>  dextrose 50% Injectable 12.5 Gram(s) IV Push once  dextrose 50% Injectable 25 Gram(s) IV Push once  dextrose 50% Injectable 25 Gram(s) IV Push once  docusate sodium 100 milliGRAM(s) Oral three times a day  enalapril 10 milliGRAM(s) Oral two times a day  famotidine    Tablet 20 milliGRAM(s) Oral daily  furosemide    Tablet 20 milliGRAM(s) Oral daily  guaiFENesin   Syrup  (Sugar-Free) 100 milliGRAM(s) Oral every 6 hours  insulin glargine Injectable (LANTUS) 8 Unit(s) SubCutaneous at bedtime  insulin lispro (HumaLOG) corrective regimen sliding scale   SubCutaneous three times a day before meals  insulin lispro (HumaLOG) corrective regimen sliding scale   SubCutaneous at bedtime  montelukast 10 milliGRAM(s) Oral daily  predniSONE   Tablet 40 milliGRAM(s) Oral daily  senna 2 Tablet(s) Oral at bedtime  spironolactone 25 milliGRAM(s) Oral daily  tiotropium 18 MICROgram(s) Capsule 1 Capsule(s) Inhalation daily    MEDICATIONS  (PRN):  acetaminophen   Tablet 650 milliGRAM(s) Oral every 6 hours PRN fever, pain, headache  dextrose Gel 1 Dose(s) Oral once PRN Blood Glucose LESS THAN 70 milliGRAM(s)/deciliter  glucagon  Injectable 1 milliGRAM(s) IntraMuscular once PRN Glucose LESS THAN 70 milligrams/deciliter      Vital Signs Last 24 Hrs  T(F): 98.1 (01-25-18 @ 04:18), Max: 98.1 (01-25-18 @ 04:18)  HR: 83 (01-25-18 @ 04:18) (83 - 110)  BP: 108/70 (01-25-18 @ 04:18) (108/70 - 140/83)  RR: 18 (01-25-18 @ 04:18) (18 - 22)  SpO2: 97% (01-25-18 @ 04:18) (96% - 98%)  Telemetry:   CAPILLARY BLOOD GLUCOSE      POCT Blood Glucose.: 199 mg/dL (24 Jan 2018 21:05)  POCT Blood Glucose.: 160 mg/dL (24 Jan 2018 18:26)  POCT Blood Glucose.: 184 mg/dL (24 Jan 2018 12:45)  POCT Blood Glucose.: 102 mg/dL (24 Jan 2018 09:12)    I&O's Summary    24 Jan 2018 07:01  -  25 Jan 2018 07:00  --------------------------------------------------------  IN: 1380 mL / OUT: 0 mL / NET: 1380 mL        PHYSICAL EXAM:  GENERAL: NAD, well-developed  HEAD:  Atraumatic, Normocephalic  EYES: EOMI, PERRLA, conjunctiva and sclera clear  NECK: Supple, No JVD  CHEST/LUNG: Clear to auscultation bilaterally; No wheeze  HEART: Regular rate and rhythm; No murmurs, rubs, or gallops  ABDOMEN: Soft, Nontender, Nondistended; Bowel sounds present  EXTREMITIES:  2+ Peripheral Pulses, No clubbing, cyanosis, or edema  PSYCH: AAOx3  NEUROLOGY: non-focal  SKIN: No rashes or lesions    LABS:                        12.2   8.6   )-----------( 166      ( 25 Jan 2018 07:11 )             35.3     01-25    138  |  99  |  23  ----------------------------<  125<H>  4.3   |  27  |  1.09    Ca    9.9      25 Jan 2018 07:11  Phos  3.7     01-24  Mg     1.9     01-24    TPro  7.0  /  Alb  3.8  /  TBili  0.4  /  DBili  x   /  AST  19  /  ALT  18  /  AlkPhos  68  01-24      CARDIAC MARKERS ( 24 Jan 2018 00:33 )  x     / 0.01 ng/mL / x     / x     / 3.7 ng/mL  CARDIAC MARKERS ( 23 Jan 2018 18:47 )  x     / 0.02 ng/mL / 231 U/L / x     / 4.9 ng/mL          RADIOLOGY & ADDITIONAL TESTS:    Imaging Personally Reviewed:    Consultant(s) Notes Reviewed:      Care Discussed with Consultants/Other Providers:

## 2018-01-25 NOTE — CONSULT NOTE ADULT - SUBJECTIVE AND OBJECTIVE BOX
HPI:  73 yo female with PMH of Asthma with frequent exacerbation, systolic CHF, s/p AICD, aflutter on eliquis, T2DM, diverticulitis, gout, HTN, HLD, presents here with SOB.  Patient states that she has been having SOB and cough for a long time.  She was seen by her pulmonologist who prescribed her azithromycin which she did not start taking until 4 days ago.  She says with azithromycin her symptoms improved slightly.  This morning she went to her cardiologist office.  After returning home she started having worsening of her cough with thick yellow sputum.  She also has been having muscle spasms under her breast both sides.  She denies any fever.  Her daughter is also sick with cough and cold like symptoms.  She does have dyspnea with exertion, but denies orthopnea or PND. (23 Jan 2018 23:41)      Admit Diagnosis  Chronic obstructive pulmonary disease with acute exacerbation      ENDOCRINE HPI: 73 y/o Type 2 diabetes treated with Lantus insulin 8 units nightly, HbA1c 8.2%. Patient admitted with asthma exacerbation Started on high dose steroids with taper. Patient concerned about glycemic control.  Type 2 diabetes treated with lantus 8 units at HS. FS at am low, no pm FS, Patient started on prednisone 40 mg/d with a taper concerned about glycemic control.  No known complications per patient, PO intake is good. No fever no chills. PMD suggested she use Prandin for hyperglycemia. FS over 200 mg/dL on that.    PAST MEDICAL & SURGICAL HISTORY:  Atrial flutter  Diverticulitis  Cardiomyopathy  Kidney stone  COPD (chronic obstructive pulmonary disease)  Cardiac Pacemaker  HTN - Hypertension  Gout  Diabetes  Congestive Heart Failure  Asthma  S/P cholecystectomy  AICD (Automatic Cardioverter/Defibrillator) Present: inserted in Aug, 2008. Due for battery change in 1 month. ( [employer]) . Inserted by Dr Duffy      FAMILY HISTORY:  Family history of brain tumor (Mother)      Social History:    Outpatient Medications:    MEDICATIONS  (STANDING):  ALBUTerol/ipratropium for Nebulization 3 milliLiter(s) Nebulizer every 6 hours  apixaban 5 milliGRAM(s) Oral every 12 hours  aspirin enteric coated 81 milliGRAM(s) Oral daily  atorvastatin 20 milliGRAM(s) Oral at bedtime  buDESOnide 160 MICROgram(s)/formoterol 4.5 MICROgram(s) Inhaler 2 Puff(s) Inhalation two times a day  dextrose 5%. 1000 milliLiter(s) (50 mL/Hr) IV Continuous <Continuous>  dextrose 50% Injectable 12.5 Gram(s) IV Push once  dextrose 50% Injectable 25 Gram(s) IV Push once  dextrose 50% Injectable 25 Gram(s) IV Push once  docusate sodium 100 milliGRAM(s) Oral three times a day  enalapril 10 milliGRAM(s) Oral two times a day  famotidine    Tablet 20 milliGRAM(s) Oral daily  furosemide    Tablet 20 milliGRAM(s) Oral daily  guaiFENesin   Syrup  (Sugar-Free) 100 milliGRAM(s) Oral every 6 hours  insulin glargine Injectable (LANTUS) 8 Unit(s) SubCutaneous at bedtime  insulin lispro (HumaLOG) corrective regimen sliding scale   SubCutaneous three times a day before meals  insulin lispro (HumaLOG) corrective regimen sliding scale   SubCutaneous at bedtime  montelukast 10 milliGRAM(s) Oral daily  predniSONE   Tablet 40 milliGRAM(s) Oral daily  senna 2 Tablet(s) Oral at bedtime  spironolactone 25 milliGRAM(s) Oral daily  tiotropium 18 MICROgram(s) Capsule 1 Capsule(s) Inhalation daily    MEDICATIONS  (PRN):  acetaminophen   Tablet 650 milliGRAM(s) Oral every 6 hours PRN fever, pain, headache  dextrose Gel 1 Dose(s) Oral once PRN Blood Glucose LESS THAN 70 milliGRAM(s)/deciliter  glucagon  Injectable 1 milliGRAM(s) IntraMuscular once PRN Glucose LESS THAN 70 milligrams/deciliter      Allergies    Coreg (Other)  digoxin (Other; Short breath (Mild to Mod))  penicillins (Hives)  Solu-Medrol (Other)    Intolerances        Review of Systems:  Constitutional: No fever, no chills, SOB  Eyes: No blurry vision  Neuro: No tremors  HEENT: No pain  Cardiovascular: No chest pain, palpitations  Respiratory: SOB, mild cough, wheezing  GI: No nausea, vomiting, abdominal pain  : No dysuria  Skin: no rash  Psych: no depression  Endocrine: no polyuria, polydipsia  Hem/lymph: no swelling  Osteoporosis: no fractures    ALL OTHER SYSTEMS REVIEWED AND NEGATIVE    PHYSICAL EXAM:  VITALS: T(C): 36.4 (01-25-18 @ 14:12)  T(F): 97.6 (01-25-18 @ 14:12), Max: 98.1 (01-25-18 @ 04:18)  HR: 103 (01-25-18 @ 14:12) (83 - 103)  BP: 125/78 (01-25-18 @ 14:12) (108/70 - 144/84)  RR:  (17 - 18)  SpO2:  (94% - 97%)  Wt(lbs): --186  GENERAL: NAD, well-groomed, well-developed  EYES: No proptosis, no lid lag, anicteric  HEENT:  Atraumatic, Normocephalic, moist mucous membranes  RESPIRATORY: Clear to auscultation bilaterally; mild wheezing  CARDIOVASCULAR: Regular rate and rhythm; No murmurs; no peripheral edema  GI: Soft, nontender, non distended, normal bowel sounds  SKIN: Dry, intact, No rashes or lesions  MUSCULOSKELETAL: Full range of motion, normal strength  NEURO: sensation intact, extraocular movements intact, no tremor  PSYCH: Alert and oriented x 3, normal affect, normal mood    POCT Blood Glucose.: 267 mg/dL (01-25-18 @ 13:21)  POCT Blood Glucose.: 119 mg/dL (01-25-18 @ 08:54)  POCT Blood Glucose.: 199 mg/dL (01-24-18 @ 21:05)  POCT Blood Glucose.: 160 mg/dL (01-24-18 @ 18:26)  POCT Blood Glucose.: 184 mg/dL (01-24-18 @ 12:45)  POCT Blood Glucose.: 102 mg/dL (01-24-18 @ 09:12)  POCT Blood Glucose.: 161 mg/dL (01-23-18 @ 21:39)                            12.2   8.6   )-----------( 166      ( 25 Jan 2018 07:11 )             35.3       01-25    138  |  99  |  23  ----------------------------<  125<H>  4.3   |  27  |  1.09    Ca    9.9      25 Jan 2018 07:11  Phos  3.7     01-24  Mg     1.9     01-24    TPro  7.0  /  Alb  3.8  /  TBili  0.4  /  DBili  x   /  AST  19  /  ALT  18  /  AlkPhos  68  01-24      Thyroid Function Tests:      Hemoglobin A1C, Whole Blood: 8.2 % <H> [4.0 - 5.6] (11-30-17 @ 10:34)          Radiology:

## 2018-01-25 NOTE — DISCHARGE NOTE ADULT - CARE PROVIDERS DIRECT ADDRESSES
[email or web address]

## 2018-01-25 NOTE — DISCHARGE NOTE ADULT - CARE PROVIDER_API CALL
Kirill Daily), Internal Medicine; Pulmonary Disease  [facility]  [address] [address] [place] [address]  Phone: [phone]  Fax: [phone]    Balaji Hobson (MD; MPH), Adv Heart Fail Trnsplnt Cardio; Cardiology  [address] [place] [address]  Phone: [phone]  Fax: [phone]    Neena Comer), Internal Medicine  [address] [place] [address]  Phone: [phone]  Fax: [phone]    Luis Miguel Car), EndocrinologyMetabDiabetes; Internal Medicine  [address] [address]  Phone: [phone]  Fax: [phone]

## 2018-01-25 NOTE — PROGRESS NOTE ADULT - PROBLEM SELECTOR PLAN 1
Patient with asthma exacerbation likely due to underlying viral infection  c/w radha for now  no role for ABx, agrees to po Prednisone, will start at 40 mg and taper through 1/30. on [address] planning

## 2018-01-25 NOTE — DISCHARGE NOTE ADULT - HOSPITAL COURSE
73 y/o F with PMH of uncontrolled asthma (on oral steroids >5x per year), respiratory failure 2017 in the setting of Flu/PNA, HFrEF, s/p AICD, aflutter on eliquis, T2DM, diverticulitis, gout, HTN, HLD, presents here with SOB.  Patient states that she has been having SOB and cough for a long time.  She was seen by her pulmonologist who prescribed her azithromycin which she did not start taking until 4 days ago.  She says with azithromycin her symptoms improved slightly.  This morning she went to her cardiologist office.  After returning home she started having worsening of her cough with thick yellow sputum.   No evidence of PNA on CXR.  Procalcitonin minimal…patient observed off antibiotics.   Patient refusing Mucinex (bad reaction in the past).  Patient now agreeable to short prednisone course – taper to be completed on January 3th.  Patient seen by Endocrinologists due to use of steroids with history of diabetes and her own concerns about glycemic control.  HbA1c 8.2%. Lantus increased to 14 units while in hospital and patient started on humalog 3 units per meal with scale.  Patient to be discharged on Lantus insulin 14 units taper to 8 units and prandin 1 mg before meals if FS above 150 mg/dL before the meals, until she is done with the prednisone.  Patient stable for discharge to home – to follow up with Primary Care and Pulmonology within 1 week, and Endocrinology within 2 weeks. 71 y/o F with PMH of uncontrolled asthma (on oral steroids >5x per year), respiratory failure 2017 in the setting of Flu/PNA, HFrEF, s/p AICD, aflutter on eliquis, T2DM, diverticulitis, gout, HTN, HLD, presents here with SOB.  Patient states that she has been having SOB and cough for a long time.  She was seen by her pulmonologist who prescribed her azithromycin which she did not start taking until 4 days ago.  She says with azithromycin her symptoms improved slightly.  This morning she went to her cardiologist office.  After returning home she started having worsening of her cough with thick yellow sputum.   No evidence of PNA on CXR.  Procalcitonin minimal…patient observed off antibiotics.   Patient refusing Mucinex (bad reaction in the past).  Patient now agreeable to short prednisone course – taper to be completed on January 3th.  Patient seen by Endocrinologists due to use of steroids with history of diabetes and her own concerns about glycemic control.  HbA1c 8.2%. Lantus increased to 14 units while in hospital and patient started on humalog 3 units per meal with scale.  Patient to be discharged on Lantus insulin 14 units taper to 8 units and prandin 1 mg before meals if FS above 150 mg/dL before the meals, until she is done with the prednisone.  Patient stable for discharge to home – to follow up with Primary Care and Pulmonology within 1 week, and with Cardiology within 1-2 weeks.

## 2018-01-26 VITALS — DIASTOLIC BLOOD PRESSURE: 77 MMHG | HEART RATE: 101 BPM | SYSTOLIC BLOOD PRESSURE: 127 MMHG

## 2018-01-26 LAB — GLUCOSE BLDC GLUCOMTR-MCNC: 126 MG/DL — HIGH (ref 70–99)

## 2018-01-26 PROCEDURE — 84145 PROCALCITONIN (PCT): CPT

## 2018-01-26 PROCEDURE — 87040 BLOOD CULTURE FOR BACTERIA: CPT

## 2018-01-26 PROCEDURE — 82435 ASSAY OF BLOOD CHLORIDE: CPT

## 2018-01-26 PROCEDURE — 82785 ASSAY OF IGE: CPT

## 2018-01-26 PROCEDURE — 82947 ASSAY GLUCOSE BLOOD QUANT: CPT

## 2018-01-26 PROCEDURE — 71045 X-RAY EXAM CHEST 1 VIEW: CPT

## 2018-01-26 PROCEDURE — 82565 ASSAY OF CREATININE: CPT

## 2018-01-26 PROCEDURE — 85027 COMPLETE CBC AUTOMATED: CPT

## 2018-01-26 PROCEDURE — 87633 RESP VIRUS 12-25 TARGETS: CPT

## 2018-01-26 PROCEDURE — 80053 COMPREHEN METABOLIC PANEL: CPT

## 2018-01-26 PROCEDURE — 84484 ASSAY OF TROPONIN QUANT: CPT

## 2018-01-26 PROCEDURE — 83880 ASSAY OF NATRIURETIC PEPTIDE: CPT

## 2018-01-26 PROCEDURE — 82962 GLUCOSE BLOOD TEST: CPT

## 2018-01-26 PROCEDURE — 94640 AIRWAY INHALATION TREATMENT: CPT

## 2018-01-26 PROCEDURE — 93005 ELECTROCARDIOGRAM TRACING: CPT

## 2018-01-26 PROCEDURE — 85014 HEMATOCRIT: CPT

## 2018-01-26 PROCEDURE — 83735 ASSAY OF MAGNESIUM: CPT

## 2018-01-26 PROCEDURE — 84132 ASSAY OF SERUM POTASSIUM: CPT

## 2018-01-26 PROCEDURE — 99285 EMERGENCY DEPT VISIT HI MDM: CPT | Mod: 25

## 2018-01-26 PROCEDURE — 87581 M.PNEUMON DNA AMP PROBE: CPT

## 2018-01-26 PROCEDURE — 84295 ASSAY OF SERUM SODIUM: CPT

## 2018-01-26 PROCEDURE — 82330 ASSAY OF CALCIUM: CPT

## 2018-01-26 PROCEDURE — 87798 DETECT AGENT NOS DNA AMP: CPT

## 2018-01-26 PROCEDURE — 82553 CREATINE MB FRACTION: CPT

## 2018-01-26 PROCEDURE — 83605 ASSAY OF LACTIC ACID: CPT

## 2018-01-26 PROCEDURE — 84100 ASSAY OF PHOSPHORUS: CPT

## 2018-01-26 PROCEDURE — 80048 BASIC METABOLIC PNL TOTAL CA: CPT

## 2018-01-26 PROCEDURE — 82550 ASSAY OF CK (CPK): CPT

## 2018-01-26 PROCEDURE — 87486 CHLMYD PNEUM DNA AMP PROBE: CPT

## 2018-01-26 PROCEDURE — 82803 BLOOD GASES ANY COMBINATION: CPT

## 2018-01-26 RX ORDER — BUDESONIDE AND FORMOTEROL FUMARATE DIHYDRATE 160; 4.5 UG/1; UG/1
2 AEROSOL RESPIRATORY (INHALATION)
Qty: 0 | Refills: 0 | COMMUNITY

## 2018-01-26 RX ORDER — APIXABAN 2.5 MG/1
1 TABLET, FILM COATED ORAL
Qty: 0 | Refills: 0 | COMMUNITY
Start: 2018-01-26

## 2018-01-26 RX ORDER — APIXABAN 2.5 MG/1
1 TABLET, FILM COATED ORAL
Qty: 0 | Refills: 0 | DISCHARGE
Start: 2018-01-26

## 2018-01-26 RX ORDER — MONTELUKAST 4 MG/1
1 TABLET, CHEWABLE ORAL
Qty: 14 | Refills: 0 | OUTPATIENT
Start: 2018-01-26 | End: 2018-02-08

## 2018-01-26 RX ORDER — TIOTROPIUM BROMIDE 18 UG/1
1 CAPSULE ORAL; RESPIRATORY (INHALATION)
Qty: 30 | Refills: 0 | OUTPATIENT

## 2018-01-26 RX ORDER — BUDESONIDE AND FORMOTEROL FUMARATE DIHYDRATE 160; 4.5 UG/1; UG/1
2 AEROSOL RESPIRATORY (INHALATION)
Qty: 0 | Refills: 0 | COMMUNITY
Start: 2018-01-26

## 2018-01-26 RX ORDER — TIOTROPIUM BROMIDE 18 UG/1
1 CAPSULE ORAL; RESPIRATORY (INHALATION)
Qty: 0 | Refills: 0 | DISCHARGE
Start: 2018-01-26

## 2018-01-26 RX ORDER — DOCUSATE SODIUM 100 MG
1 CAPSULE ORAL
Qty: 0 | Refills: 0 | COMMUNITY
Start: 2018-01-26

## 2018-01-26 RX ORDER — SPIRONOLACTONE 25 MG/1
1 TABLET, FILM COATED ORAL
Qty: 0 | Refills: 0 | COMMUNITY
Start: 2018-01-26

## 2018-01-26 RX ORDER — SENNA PLUS 8.6 MG/1
2 TABLET ORAL
Qty: 0 | Refills: 0 | COMMUNITY
Start: 2018-01-26

## 2018-01-26 RX ORDER — INSULIN GLARGINE 100 [IU]/ML
8 INJECTION, SOLUTION SUBCUTANEOUS
Qty: 0 | Refills: 0 | COMMUNITY

## 2018-01-26 RX ADMIN — Medication 3 UNIT(S): at 09:52

## 2018-01-26 RX ADMIN — TIOTROPIUM BROMIDE 1 CAPSULE(S): 18 CAPSULE ORAL; RESPIRATORY (INHALATION) at 08:56

## 2018-01-26 RX ADMIN — MONTELUKAST 10 MILLIGRAM(S): 4 TABLET, CHEWABLE ORAL at 08:56

## 2018-01-26 RX ADMIN — Medication 10 MILLIGRAM(S): at 08:55

## 2018-01-26 RX ADMIN — SPIRONOLACTONE 25 MILLIGRAM(S): 25 TABLET, FILM COATED ORAL at 06:13

## 2018-01-26 RX ADMIN — APIXABAN 5 MILLIGRAM(S): 2.5 TABLET, FILM COATED ORAL at 06:12

## 2018-01-26 RX ADMIN — BUDESONIDE AND FORMOTEROL FUMARATE DIHYDRATE 2 PUFF(S): 160; 4.5 AEROSOL RESPIRATORY (INHALATION) at 06:12

## 2018-01-26 RX ADMIN — Medication 100 MILLIGRAM(S): at 06:12

## 2018-01-26 RX ADMIN — Medication 20 MILLIGRAM(S): at 06:12

## 2018-01-26 RX ADMIN — Medication 3 MILLILITER(S): at 11:17

## 2018-01-26 RX ADMIN — Medication 100 MILLIGRAM(S): at 11:18

## 2018-01-26 RX ADMIN — Medication 3 MILLILITER(S): at 06:12

## 2018-01-26 RX ADMIN — FAMOTIDINE 20 MILLIGRAM(S): 10 INJECTION INTRAVENOUS at 08:56

## 2018-01-26 RX ADMIN — Medication 81 MILLIGRAM(S): at 08:56

## 2018-01-26 RX ADMIN — Medication 40 MILLIGRAM(S): at 06:13

## 2018-01-26 NOTE — PROGRESS NOTE ADULT - PROBLEM SELECTOR PLAN 1
Patient with asthma exacerbation likely due to underlying viral infection  c/w radha for now  no role for ABx, doing well  po Prednisone,  started  at 40 mg and taper through 1/30. on [address] planning

## 2018-01-26 NOTE — PROGRESS NOTE ADULT - SUBJECTIVE AND OBJECTIVE BOX
Patient is a 72y old  Female who presents with a chief complaint of shortness of breath (25 Jan 2018 18:44)      SUBJECTIVE / OVERNIGHT EVENTS: feels better, coughing but not dyspneic nor wheezing. going home today    MEDICATIONS  (STANDING):  ALBUTerol/ipratropium for Nebulization 3 milliLiter(s) Nebulizer every 6 hours  apixaban 5 milliGRAM(s) Oral every 12 hours  aspirin enteric coated 81 milliGRAM(s) Oral daily  atorvastatin 20 milliGRAM(s) Oral at bedtime  buDESOnide 160 MICROgram(s)/formoterol 4.5 MICROgram(s) Inhaler 2 Puff(s) Inhalation two times a day  dextrose 5%. 1000 milliLiter(s) (50 mL/Hr) IV Continuous <Continuous>  dextrose 50% Injectable 12.5 Gram(s) IV Push once  dextrose 50% Injectable 25 Gram(s) IV Push once  dextrose 50% Injectable 25 Gram(s) IV Push once  docusate sodium 100 milliGRAM(s) Oral three times a day  enalapril 10 milliGRAM(s) Oral two times a day  famotidine    Tablet 20 milliGRAM(s) Oral daily  furosemide    Tablet 20 milliGRAM(s) Oral daily  guaiFENesin   Syrup  (Sugar-Free) 100 milliGRAM(s) Oral every 6 hours  insulin glargine Injectable (LANTUS) 14 Unit(s) SubCutaneous at bedtime  insulin lispro (HumaLOG) corrective regimen sliding scale   SubCutaneous three times a day before meals  insulin lispro (HumaLOG) corrective regimen sliding scale   SubCutaneous at bedtime  insulin lispro Injectable (HumaLOG) 3 Unit(s) SubCutaneous three times a day before meals  montelukast 10 milliGRAM(s) Oral daily  predniSONE   Tablet 40 milliGRAM(s) Oral daily  senna 2 Tablet(s) Oral at bedtime  spironolactone 25 milliGRAM(s) Oral daily  tiotropium 18 MICROgram(s) Capsule 1 Capsule(s) Inhalation daily    MEDICATIONS  (PRN):  acetaminophen   Tablet 650 milliGRAM(s) Oral every 6 hours PRN fever, pain, headache  dextrose Gel 1 Dose(s) Oral once PRN Blood Glucose LESS THAN 70 milliGRAM(s)/deciliter  glucagon  Injectable 1 milliGRAM(s) IntraMuscular once PRN Glucose LESS THAN 70 milligrams/deciliter      Vital Signs Last 24 Hrs  T(F): 98.1 (01-26-18 @ 05:23), Max: 98.1 (01-26-18 @ 05:23)  HR: 92 (01-26-18 @ 05:23) (92 - 104)  BP: 114/67 (01-26-18 @ 05:23) (114/67 - 144/84)  RR: 19 (01-26-18 @ 05:23) (17 - 19)  SpO2: 99% (01-26-18 @ 05:23) (94% - 99%)  Telemetry:   CAPILLARY BLOOD GLUCOSE      POCT Blood Glucose.: 245 mg/dL (25 Jan 2018 21:08)  POCT Blood Glucose.: 272 mg/dL (25 Jan 2018 18:10)  POCT Blood Glucose.: 267 mg/dL (25 Jan 2018 13:21)  POCT Blood Glucose.: 119 mg/dL (25 Jan 2018 08:54)    I&O's Summary    25 Jan 2018 07:01  -  26 Jan 2018 07:00  --------------------------------------------------------  IN: 960 mL / OUT: 301 mL / NET: 659 mL        PHYSICAL EXAM:  GENERAL: NAD, well-developed  HEAD:  Atraumatic, Normocephalic  EYES: EOMI, PERRLA, conjunctiva and sclera clear  NECK: Supple, No JVD  CHEST/LUNG: Clear to auscultation bilaterally; No wheeze  HEART: Regular rate and rhythm; No murmurs, rubs, or gallops  ABDOMEN: Soft, Nontender, Nondistended; Bowel sounds present  EXTREMITIES:  2+ Peripheral Pulses, No clubbing, cyanosis, or edema  PSYCH: AAOx3  NEUROLOGY: non-focal  SKIN: No rashes or lesions    LABS:                        12.2   8.6   )-----------( 166      ( 25 Jan 2018 07:11 )             35.3     01-25    138  |  99  |  23  ----------------------------<  125<H>  4.3   |  27  |  1.09    Ca    9.9      25 Jan 2018 07:11                RADIOLOGY & ADDITIONAL TESTS:    Imaging Personally Reviewed:    Consultant(s) Notes Reviewed:      Care Discussed with Consultants/Other Providers:

## 2018-01-26 NOTE — PROGRESS NOTE ADULT - PROBLEM SELECTOR PLAN 3
check A1c  sliding scale  c/w home insulin regimen. endo consult reviewed re  Insulin management while on steroids. outptn f/u

## 2018-01-28 LAB
CULTURE RESULTS: SIGNIFICANT CHANGE UP
CULTURE RESULTS: SIGNIFICANT CHANGE UP
SPECIMEN SOURCE: SIGNIFICANT CHANGE UP
SPECIMEN SOURCE: SIGNIFICANT CHANGE UP

## 2018-03-07 ENCOUNTER — APPOINTMENT (OUTPATIENT)
Dept: OTOLARYNGOLOGY | Facility: CLINIC | Age: 73
End: 2018-03-07

## 2018-03-08 ENCOUNTER — INPATIENT (INPATIENT)
Facility: HOSPITAL | Age: 73
LOS: 4 days | Discharge: HOME | DRG: 286 | End: 2018-03-13
Attending: HOSPITALIST | Admitting: INTERNAL MEDICINE
Payer: MEDICARE

## 2018-03-08 VITALS
OXYGEN SATURATION: 96 % | SYSTOLIC BLOOD PRESSURE: 119 MMHG | TEMPERATURE: 98 F | DIASTOLIC BLOOD PRESSURE: 66 MMHG | WEIGHT: 184.97 LBS | HEART RATE: 89 BPM | HEIGHT: 62 IN | RESPIRATION RATE: 18 BRPM

## 2018-03-08 DIAGNOSIS — E11.9 TYPE 2 DIABETES MELLITUS WITHOUT COMPLICATIONS: ICD-10-CM

## 2018-03-08 DIAGNOSIS — I48.92 UNSPECIFIED ATRIAL FLUTTER: ICD-10-CM

## 2018-03-08 DIAGNOSIS — I20.0 UNSTABLE ANGINA: ICD-10-CM

## 2018-03-08 DIAGNOSIS — K21.9 GASTRO-ESOPHAGEAL REFLUX DISEASE WITHOUT ESOPHAGITIS: ICD-10-CM

## 2018-03-08 DIAGNOSIS — I50.23 ACUTE ON CHRONIC SYSTOLIC (CONGESTIVE) HEART FAILURE: ICD-10-CM

## 2018-03-08 DIAGNOSIS — J44.9 CHRONIC OBSTRUCTIVE PULMONARY DISEASE, UNSPECIFIED: ICD-10-CM

## 2018-03-08 LAB
ALBUMIN SERPL ELPH-MCNC: 4.1 G/DL — SIGNIFICANT CHANGE UP (ref 3.3–5)
ALP SERPL-CCNC: 77 U/L — SIGNIFICANT CHANGE UP (ref 40–120)
ALT FLD-CCNC: 14 U/L RC — SIGNIFICANT CHANGE UP (ref 10–45)
ANION GAP SERPL CALC-SCNC: 14 MMOL/L — SIGNIFICANT CHANGE UP (ref 5–17)
AST SERPL-CCNC: 16 U/L — SIGNIFICANT CHANGE UP (ref 10–40)
BILIRUB SERPL-MCNC: 0.5 MG/DL — SIGNIFICANT CHANGE UP (ref 0.2–1.2)
BUN SERPL-MCNC: 39 MG/DL — HIGH (ref 7–23)
CALCIUM SERPL-MCNC: 9.5 MG/DL — SIGNIFICANT CHANGE UP (ref 8.4–10.5)
CHLORIDE SERPL-SCNC: 95 MMOL/L — LOW (ref 96–108)
CO2 SERPL-SCNC: 25 MMOL/L — SIGNIFICANT CHANGE UP (ref 22–31)
CREAT SERPL-MCNC: 1.28 MG/DL — SIGNIFICANT CHANGE UP (ref 0.5–1.3)
GLUCOSE BLDC GLUCOMTR-MCNC: 151 MG/DL — HIGH (ref 70–99)
GLUCOSE BLDC GLUCOMTR-MCNC: 242 MG/DL — HIGH (ref 70–99)
GLUCOSE BLDC GLUCOMTR-MCNC: 295 MG/DL — HIGH (ref 70–99)
GLUCOSE SERPL-MCNC: 313 MG/DL — HIGH (ref 70–99)
HCT VFR BLD CALC: 36.3 % — SIGNIFICANT CHANGE UP (ref 34.5–45)
HGB BLD-MCNC: 12.4 G/DL — SIGNIFICANT CHANGE UP (ref 11.5–15.5)
MCHC RBC-ENTMCNC: 33.8 PG — SIGNIFICANT CHANGE UP (ref 27–34)
MCHC RBC-ENTMCNC: 34.2 GM/DL — SIGNIFICANT CHANGE UP (ref 32–36)
MCV RBC AUTO: 98.8 FL — SIGNIFICANT CHANGE UP (ref 80–100)
PLATELET # BLD AUTO: 176 K/UL — SIGNIFICANT CHANGE UP (ref 150–400)
POTASSIUM SERPL-MCNC: 4.5 MMOL/L — SIGNIFICANT CHANGE UP (ref 3.5–5.3)
POTASSIUM SERPL-SCNC: 4.5 MMOL/L — SIGNIFICANT CHANGE UP (ref 3.5–5.3)
PROT SERPL-MCNC: 7.8 G/DL — SIGNIFICANT CHANGE UP (ref 6–8.3)
RBC # BLD: 3.67 M/UL — LOW (ref 3.8–5.2)
RBC # FLD: 12.4 % — SIGNIFICANT CHANGE UP (ref 10.3–14.5)
SODIUM SERPL-SCNC: 134 MMOL/L — LOW (ref 135–145)
WBC # BLD: 10 K/UL — SIGNIFICANT CHANGE UP (ref 3.8–10.5)
WBC # FLD AUTO: 10 K/UL — SIGNIFICANT CHANGE UP (ref 3.8–10.5)

## 2018-03-08 PROCEDURE — 93010 ELECTROCARDIOGRAM REPORT: CPT

## 2018-03-08 PROCEDURE — 99152 MOD SED SAME PHYS/QHP 5/>YRS: CPT

## 2018-03-08 PROCEDURE — 99223 1ST HOSP IP/OBS HIGH 75: CPT | Mod: AI

## 2018-03-08 PROCEDURE — 93458 L HRT ARTERY/VENTRICLE ANGIO: CPT | Mod: 26

## 2018-03-08 RX ORDER — FUROSEMIDE 40 MG
1 TABLET ORAL
Qty: 0 | Refills: 0 | COMMUNITY

## 2018-03-08 RX ORDER — INSULIN LISPRO 100/ML
VIAL (ML) SUBCUTANEOUS AT BEDTIME
Qty: 0 | Refills: 0 | Status: DISCONTINUED | OUTPATIENT
Start: 2018-03-08 | End: 2018-03-13

## 2018-03-08 RX ORDER — GLUCAGON INJECTION, SOLUTION 0.5 MG/.1ML
1 INJECTION, SOLUTION SUBCUTANEOUS ONCE
Qty: 0 | Refills: 0 | Status: DISCONTINUED | OUTPATIENT
Start: 2018-03-08 | End: 2018-03-08

## 2018-03-08 RX ORDER — ALBUTEROL 90 UG/1
2.5 AEROSOL, METERED ORAL EVERY 6 HOURS
Qty: 0 | Refills: 0 | Status: DISCONTINUED | OUTPATIENT
Start: 2018-03-08 | End: 2018-03-08

## 2018-03-08 RX ORDER — INSULIN LISPRO 100/ML
VIAL (ML) SUBCUTANEOUS
Qty: 0 | Refills: 0 | Status: DISCONTINUED | OUTPATIENT
Start: 2018-03-08 | End: 2018-03-13

## 2018-03-08 RX ORDER — TIOTROPIUM BROMIDE 18 UG/1
1 CAPSULE ORAL; RESPIRATORY (INHALATION) DAILY
Qty: 0 | Refills: 0 | Status: DISCONTINUED | OUTPATIENT
Start: 2018-03-08 | End: 2018-03-13

## 2018-03-08 RX ORDER — LORATADINE 10 MG/1
10 TABLET ORAL DAILY
Qty: 0 | Refills: 0 | Status: DISCONTINUED | OUTPATIENT
Start: 2018-03-08 | End: 2018-03-13

## 2018-03-08 RX ORDER — DEXTROSE 50 % IN WATER 50 %
12.5 SYRINGE (ML) INTRAVENOUS ONCE
Qty: 0 | Refills: 0 | Status: DISCONTINUED | OUTPATIENT
Start: 2018-03-08 | End: 2018-03-08

## 2018-03-08 RX ORDER — MAGNESIUM OXIDE 400 MG ORAL TABLET 241.3 MG
400 TABLET ORAL
Qty: 0 | Refills: 0 | Status: DISCONTINUED | OUTPATIENT
Start: 2018-03-08 | End: 2018-03-13

## 2018-03-08 RX ORDER — DEXTROSE 50 % IN WATER 50 %
1 SYRINGE (ML) INTRAVENOUS ONCE
Qty: 0 | Refills: 0 | Status: DISCONTINUED | OUTPATIENT
Start: 2018-03-08 | End: 2018-03-13

## 2018-03-08 RX ORDER — FAMOTIDINE 10 MG/ML
1 INJECTION INTRAVENOUS
Qty: 0 | Refills: 0 | COMMUNITY

## 2018-03-08 RX ORDER — DEXTROSE 50 % IN WATER 50 %
25 SYRINGE (ML) INTRAVENOUS ONCE
Qty: 0 | Refills: 0 | Status: DISCONTINUED | OUTPATIENT
Start: 2018-03-08 | End: 2018-03-13

## 2018-03-08 RX ORDER — MONTELUKAST 4 MG/1
10 TABLET, CHEWABLE ORAL DAILY
Qty: 0 | Refills: 0 | Status: DISCONTINUED | OUTPATIENT
Start: 2018-03-08 | End: 2018-03-13

## 2018-03-08 RX ORDER — INSULIN GLARGINE 100 [IU]/ML
6 INJECTION, SOLUTION SUBCUTANEOUS AT BEDTIME
Qty: 0 | Refills: 0 | Status: DISCONTINUED | OUTPATIENT
Start: 2018-03-08 | End: 2018-03-13

## 2018-03-08 RX ORDER — FAMOTIDINE 10 MG/ML
20 INJECTION INTRAVENOUS DAILY
Qty: 0 | Refills: 0 | Status: DISCONTINUED | OUTPATIENT
Start: 2018-03-08 | End: 2018-03-09

## 2018-03-08 RX ORDER — REPAGLINIDE 1 MG/1
1 TABLET ORAL
Qty: 0 | Refills: 0 | COMMUNITY

## 2018-03-08 RX ORDER — SPIRONOLACTONE 25 MG/1
25 TABLET, FILM COATED ORAL DAILY
Qty: 0 | Refills: 0 | Status: DISCONTINUED | OUTPATIENT
Start: 2018-03-08 | End: 2018-03-11

## 2018-03-08 RX ORDER — INSULIN LISPRO 100/ML
VIAL (ML) SUBCUTANEOUS
Qty: 0 | Refills: 0 | Status: DISCONTINUED | OUTPATIENT
Start: 2018-03-08 | End: 2018-03-08

## 2018-03-08 RX ORDER — SODIUM CHLORIDE 0.65 %
1 AEROSOL, SPRAY (ML) NASAL
Qty: 0 | Refills: 0 | Status: DISCONTINUED | OUTPATIENT
Start: 2018-03-08 | End: 2018-03-13

## 2018-03-08 RX ORDER — DEXTROSE 50 % IN WATER 50 %
12.5 SYRINGE (ML) INTRAVENOUS ONCE
Qty: 0 | Refills: 0 | Status: DISCONTINUED | OUTPATIENT
Start: 2018-03-08 | End: 2018-03-13

## 2018-03-08 RX ORDER — INSULIN GLARGINE 100 [IU]/ML
0 INJECTION, SOLUTION SUBCUTANEOUS
Qty: 0 | Refills: 0 | COMMUNITY

## 2018-03-08 RX ORDER — SODIUM CHLORIDE 9 MG/ML
1000 INJECTION, SOLUTION INTRAVENOUS
Qty: 0 | Refills: 0 | Status: DISCONTINUED | OUTPATIENT
Start: 2018-03-08 | End: 2018-03-13

## 2018-03-08 RX ORDER — DEXTROSE 50 % IN WATER 50 %
1 SYRINGE (ML) INTRAVENOUS ONCE
Qty: 0 | Refills: 0 | Status: DISCONTINUED | OUTPATIENT
Start: 2018-03-08 | End: 2018-03-08

## 2018-03-08 RX ORDER — ATORVASTATIN CALCIUM 80 MG/1
20 TABLET, FILM COATED ORAL AT BEDTIME
Qty: 0 | Refills: 0 | Status: DISCONTINUED | OUTPATIENT
Start: 2018-03-08 | End: 2018-03-13

## 2018-03-08 RX ORDER — POTASSIUM CHLORIDE 20 MEQ
20 PACKET (EA) ORAL DAILY
Qty: 0 | Refills: 0 | Status: DISCONTINUED | OUTPATIENT
Start: 2018-03-08 | End: 2018-03-13

## 2018-03-08 RX ORDER — SODIUM CHLORIDE 9 MG/ML
1000 INJECTION, SOLUTION INTRAVENOUS
Qty: 0 | Refills: 0 | Status: DISCONTINUED | OUTPATIENT
Start: 2018-03-08 | End: 2018-03-08

## 2018-03-08 RX ORDER — ASPIRIN/CALCIUM CARB/MAGNESIUM 324 MG
81 TABLET ORAL DAILY
Qty: 0 | Refills: 0 | Status: DISCONTINUED | OUTPATIENT
Start: 2018-03-08 | End: 2018-03-13

## 2018-03-08 RX ORDER — AZITHROMYCIN 500 MG/1
250 TABLET, FILM COATED ORAL DAILY
Qty: 0 | Refills: 0 | Status: DISCONTINUED | OUTPATIENT
Start: 2018-03-08 | End: 2018-03-08

## 2018-03-08 RX ORDER — GLUCAGON INJECTION, SOLUTION 0.5 MG/.1ML
1 INJECTION, SOLUTION SUBCUTANEOUS ONCE
Qty: 0 | Refills: 0 | Status: DISCONTINUED | OUTPATIENT
Start: 2018-03-08 | End: 2018-03-13

## 2018-03-08 RX ORDER — DEXTROSE 50 % IN WATER 50 %
25 SYRINGE (ML) INTRAVENOUS ONCE
Qty: 0 | Refills: 0 | Status: DISCONTINUED | OUTPATIENT
Start: 2018-03-08 | End: 2018-03-08

## 2018-03-08 RX ORDER — MOMETASONE FUROATE AND FORMOTEROL FUMARATE DIHYDRATE 200; 5 UG/1; UG/1
2 AEROSOL RESPIRATORY (INHALATION)
Qty: 0 | Refills: 0 | COMMUNITY

## 2018-03-08 RX ORDER — SODIUM CHLORIDE 9 MG/ML
3 INJECTION INTRAMUSCULAR; INTRAVENOUS; SUBCUTANEOUS EVERY 8 HOURS
Qty: 0 | Refills: 0 | Status: DISCONTINUED | OUTPATIENT
Start: 2018-03-08 | End: 2018-03-13

## 2018-03-08 RX ORDER — BUDESONIDE AND FORMOTEROL FUMARATE DIHYDRATE 160; 4.5 UG/1; UG/1
2 AEROSOL RESPIRATORY (INHALATION)
Qty: 0 | Refills: 0 | Status: DISCONTINUED | OUTPATIENT
Start: 2018-03-08 | End: 2018-03-13

## 2018-03-08 RX ORDER — FUROSEMIDE 40 MG
40 TABLET ORAL DAILY
Qty: 0 | Refills: 0 | Status: DISCONTINUED | OUTPATIENT
Start: 2018-03-08 | End: 2018-03-10

## 2018-03-08 RX ORDER — INSULIN LISPRO 100/ML
VIAL (ML) SUBCUTANEOUS AT BEDTIME
Qty: 0 | Refills: 0 | Status: DISCONTINUED | OUTPATIENT
Start: 2018-03-08 | End: 2018-03-08

## 2018-03-08 RX ORDER — ALBUTEROL 90 UG/1
2.5 AEROSOL, METERED ORAL EVERY 6 HOURS
Qty: 0 | Refills: 0 | Status: DISCONTINUED | OUTPATIENT
Start: 2018-03-08 | End: 2018-03-09

## 2018-03-08 RX ADMIN — SODIUM CHLORIDE 3 MILLILITER(S): 9 INJECTION INTRAMUSCULAR; INTRAVENOUS; SUBCUTANEOUS at 16:34

## 2018-03-08 RX ADMIN — BUDESONIDE AND FORMOTEROL FUMARATE DIHYDRATE 2 PUFF(S): 160; 4.5 AEROSOL RESPIRATORY (INHALATION) at 17:17

## 2018-03-08 RX ADMIN — Medication 1 SPRAY(S): at 16:44

## 2018-03-08 RX ADMIN — Medication 10 MILLIGRAM(S): at 22:07

## 2018-03-08 RX ADMIN — LORATADINE 10 MILLIGRAM(S): 10 TABLET ORAL at 16:45

## 2018-03-08 RX ADMIN — FAMOTIDINE 20 MILLIGRAM(S): 10 INJECTION INTRAVENOUS at 16:45

## 2018-03-08 RX ADMIN — SODIUM CHLORIDE 3 MILLILITER(S): 9 INJECTION INTRAMUSCULAR; INTRAVENOUS; SUBCUTANEOUS at 22:01

## 2018-03-08 RX ADMIN — MAGNESIUM OXIDE 400 MG ORAL TABLET 400 MILLIGRAM(S): 241.3 TABLET ORAL at 17:18

## 2018-03-08 RX ADMIN — Medication 40 MILLIGRAM(S): at 17:22

## 2018-03-08 RX ADMIN — Medication 3: at 17:18

## 2018-03-08 RX ADMIN — Medication 20 MILLIEQUIVALENT(S): at 17:18

## 2018-03-08 RX ADMIN — Medication 81 MILLIGRAM(S): at 17:17

## 2018-03-08 RX ADMIN — TIOTROPIUM BROMIDE 1 CAPSULE(S): 18 CAPSULE ORAL; RESPIRATORY (INHALATION) at 18:00

## 2018-03-08 RX ADMIN — INSULIN GLARGINE 6 UNIT(S): 100 INJECTION, SOLUTION SUBCUTANEOUS at 22:07

## 2018-03-08 RX ADMIN — ATORVASTATIN CALCIUM 20 MILLIGRAM(S): 80 TABLET, FILM COATED ORAL at 22:07

## 2018-03-08 NOTE — PROGRESS NOTE ADULT - SUBJECTIVE AND OBJECTIVE BOX
HPI:  71 yo Afro American female with PMHx of Asthma with frequent exacerbation, systolic CHF, s/p AICD(last checked in 1/24/2018 at [facility]), aflutter on Eliquis, T2DM(A1C 7.6 in 3/1/2018 PCP Dr. Comer, [facility]), diverticulitis, gout, HTN, HLD, COPD, hx of respiratory failure and trach who presented to [facility] on 3/7 c/o epigastric pain radiated to both arms. Troponin 0.028 <-0.045, WBC 8.7 H/H 11.2/33.2 plt 158, BUN/Cr 32/1.11, GFR 59, PT 15.8, INR 1.4, now transferred here for cardiac cath. Pt denies chest pain or SOB, ambulatory. pt's recent admission was in 1/2018.   Pt's [employer].     CXR: no acute pathology is seen. CT head: no acute hemorrhage, hydrocephalus or territorial infarcts.       PAST MEDICAL & SURGICAL HISTORY:  Atrial flutter  Diverticulitis  Cardiomyopathy  Kidney stone  COPD (chronic obstructive pulmonary disease)  Cardiac Pacemaker  HTN - Hypertension  Gout  Diabetes  Congestive Heart Failure  Asthma  S/P cholecystectomy  AICD (Automatic Cardioverter/Defibrillator) Present: inserted in Aug, 2008. Due for battery change in 1 month. ( [employer]) . Inserted by Dr Duffy      Review of Systems:   CONSTITUTIONAL: No fever, weight loss, or fatigue  EYES: No eye pain, visual disturbances, or discharge  ENMT:  No difficulty hearing, tinnitus, vertigo; No sinus or throat pain  NECK: No pain or stiffness  RESPIRATORY: No cough, wheezing, chills or hemoptysis; No shortness of breath  CARDIOVASCULAR: No chest pain, palpitations, dizziness, or leg swelling  GASTROINTESTINAL: No abdominal or epigastric pain. No nausea, vomiting, or hematemesis; No diarrhea or constipation. No melena or hematochezia.  GENITOURINARY: No dysuria, frequency, hematuria, or incontinence  NEUROLOGICAL: No headaches, memory loss, loss of strength, numbness, or tremors  MUSCULOSKELETAL: No joint pain or swelling; No muscle, back, or extremity pain        Allergies    Coreg (Other)  digoxin (Other; Short breath (Mild to Mod))  penicillins (Hives)  Solu-Medrol (Other)    Intolerances        Social History:     FAMILY HISTORY:  Family history of brain tumor (Mother)      MEDICATIONS  (STANDING):  aspirin enteric coated 81 milliGRAM(s) Oral daily  atorvastatin 20 milliGRAM(s) Oral at bedtime  azithromycin   Tablet 250 milliGRAM(s) Oral daily  buDESOnide 160 MICROgram(s)/formoterol 4.5 MICROgram(s) Inhaler 2 Puff(s) Inhalation two times a day  dextrose 5%. 1000 milliLiter(s) (50 mL/Hr) IV Continuous <Continuous>  dextrose 50% Injectable 12.5 Gram(s) IV Push once  dextrose 50% Injectable 25 Gram(s) IV Push once  dextrose 50% Injectable 25 Gram(s) IV Push once  enalapril 10 milliGRAM(s) Oral two times a day  famotidine    Tablet 20 milliGRAM(s) Oral daily  furosemide   Injectable 40 milliGRAM(s) IV Push daily  insulin glargine Injectable (LANTUS) 6 Unit(s) SubCutaneous at bedtime  insulin lispro (HumaLOG) corrective regimen sliding scale   SubCutaneous three times a day before meals  insulin lispro (HumaLOG) corrective regimen sliding scale   SubCutaneous at bedtime  loratadine 10 milliGRAM(s) Oral daily  magnesium oxide 400 milliGRAM(s) Oral three times a day with meals  montelukast 10 milliGRAM(s) Oral daily  potassium chloride   Solution 20 milliEquivalent(s) Oral daily  sodium chloride 0.9% lock flush 3 milliLiter(s) IV Push every 8 hours  spironolactone 25 milliGRAM(s) Oral daily  tiotropium 18 MICROgram(s) Capsule 1 Capsule(s) Inhalation daily    MEDICATIONS  (PRN):  ALBUTerol   0.5% 2.5 milliGRAM(s) Nebulizer every 6 hours PRN Shortness of Breath and/or Wheezing  dextrose Gel 1 Dose(s) Oral once PRN Blood Glucose LESS THAN 70 milliGRAM(s)/deciliter  glucagon  Injectable 1 milliGRAM(s) IntraMuscular once PRN Glucose LESS THAN 70 milligrams/deciliter  guaiFENesin   Syrup  (Sugar-Free) 100 milliGRAM(s) Oral every 6 hours PRN Cough        CAPILLARY BLOOD GLUCOSE      POCT Blood Glucose.: 151 mg/dL (08 Mar 2018 11:30)    I&O's Summary      PHYSICAL EXAM:  GENERAL: NAD, well-developed  HEAD:  Atraumatic, Normocephalic  EYES: EOMI, PERRLA, conjunctiva and sclera clear  NECK: Supple, No JVD  CHEST/LUNG: Clear to auscultation bilaterally; No wheeze  HEART: Regular rate and rhythm; No murmurs, rubs, or gallops  ABDOMEN: Soft, Nontender, Nondistended; Bowel sounds present  EXTREMITIES:  2+ Peripheral Pulses, No clubbing, cyanosis, or edema  PSYCH: AAOx3  NEUROLOGY: non-focal  SKIN: No rashes or lesions    LABS:  pending HPI:  73 yo Afro American female with PMHx of Asthma with frequent exacerbation, systolic CHF, s/p AICD(last checked in 1/24/2018 at [facility]), T2DM(A1C 7.6 in 3/1/2018 PCP Dr. Comer, [facility]), diverticulitis, gout, HTN, HLD, COPD, hx of respiratory failure and trach who presented to [facility] on 3/7 c/o epigastric pain radiated to both arms. Troponin 0.028 <-0.045, WBC 8.7 H/H 11.2/33.2 plt 158, BUN/Cr 32/1.11, GFR 59, PT 15.8, INR 1.4, now transferred here for cardiac cath. Pt denies chest pain or SOB, ambulatory. pt's recent admission was in 1/2018.   Pt's Taoist.     Patient feels well. currently no chest pain, sob, palpitations, wants to take a nap.      CXR: no acute pathology is seen. CT head: no acute hemorrhage, hydrocephalus or territorial infarcts.       PAST MEDICAL & SURGICAL HISTORY:  Atrial flutter  Diverticulitis  Cardiomyopathy  Kidney stone  COPD (chronic obstructive pulmonary disease)  Cardiac Pacemaker  HTN - Hypertension  Gout  Diabetes  Congestive Heart Failure  Asthma  S/P cholecystectomy  AICD (Automatic Cardioverter/Defibrillator) Present: inserted in Aug, 2008. Due for battery change in 1 month. ( [employer]) . Inserted by Dr Duffy      Review of Systems:   CONSTITUTIONAL: No fever, weight loss, or fatigue  EYES: No eye pain, visual disturbances, or discharge  ENMT:  No difficulty hearing, tinnitus, vertigo; No sinus or throat pain  NECK: No pain or stiffness  RESPIRATORY: No cough, wheezing, chills or hemoptysis; No shortness of breath  CARDIOVASCULAR: No chest pain, palpitations, dizziness, or leg swelling  GASTROINTESTINAL: No abdominal or epigastric pain. No nausea, vomiting, or hematemesis; No diarrhea or constipation. No melena or hematochezia.  GENITOURINARY: No dysuria, frequency, hematuria, or incontinence  NEUROLOGICAL: No headaches, memory loss, loss of strength, numbness, or tremors  MUSCULOSKELETAL: No joint pain or swelling; No muscle, back, or extremity pain        Allergies    Coreg (Other)  digoxin (Other; Short breath (Mild to Mod))  penicillins (Hives)  Solu-Medrol (Other)    Intolerances        Social History:     FAMILY HISTORY:  Family history of brain tumor (Mother)      MEDICATIONS  (STANDING):  aspirin enteric coated 81 milliGRAM(s) Oral daily  atorvastatin 20 milliGRAM(s) Oral at bedtime  azithromycin   Tablet 250 milliGRAM(s) Oral daily  buDESOnide 160 MICROgram(s)/formoterol 4.5 MICROgram(s) Inhaler 2 Puff(s) Inhalation two times a day  dextrose 5%. 1000 milliLiter(s) (50 mL/Hr) IV Continuous <Continuous>  dextrose 50% Injectable 12.5 Gram(s) IV Push once  dextrose 50% Injectable 25 Gram(s) IV Push once  dextrose 50% Injectable 25 Gram(s) IV Push once  enalapril 10 milliGRAM(s) Oral two times a day  famotidine    Tablet 20 milliGRAM(s) Oral daily  furosemide   Injectable 40 milliGRAM(s) IV Push daily  insulin glargine Injectable (LANTUS) 6 Unit(s) SubCutaneous at bedtime  insulin lispro (HumaLOG) corrective regimen sliding scale   SubCutaneous three times a day before meals  insulin lispro (HumaLOG) corrective regimen sliding scale   SubCutaneous at bedtime  loratadine 10 milliGRAM(s) Oral daily  magnesium oxide 400 milliGRAM(s) Oral three times a day with meals  montelukast 10 milliGRAM(s) Oral daily  potassium chloride   Solution 20 milliEquivalent(s) Oral daily  sodium chloride 0.9% lock flush 3 milliLiter(s) IV Push every 8 hours  spironolactone 25 milliGRAM(s) Oral daily  tiotropium 18 MICROgram(s) Capsule 1 Capsule(s) Inhalation daily    MEDICATIONS  (PRN):  ALBUTerol   0.5% 2.5 milliGRAM(s) Nebulizer every 6 hours PRN Shortness of Breath and/or Wheezing  dextrose Gel 1 Dose(s) Oral once PRN Blood Glucose LESS THAN 70 milliGRAM(s)/deciliter  glucagon  Injectable 1 milliGRAM(s) IntraMuscular once PRN Glucose LESS THAN 70 milligrams/deciliter  guaiFENesin   Syrup  (Sugar-Free) 100 milliGRAM(s) Oral every 6 hours PRN Cough        CAPILLARY BLOOD GLUCOSE      POCT Blood Glucose.: 151 mg/dL (08 Mar 2018 11:30)    I&O's Summary      PHYSICAL EXAM:  GENERAL: NAD, well-developed  HEAD:  Atraumatic, Normocephalic  EYES: EOMI, PERRLA, conjunctiva and sclera clear  NECK: Supple, No JVD  CHEST/LUNG: wheeze+  HEART: Regular rate and rhythm; No murmurs, rubs, or gallops  ABDOMEN: Soft, Nontender, Nondistended; Bowel sounds present  EXTREMITIES:  2+ Peripheral Pulses, 2+ pitting edema  PSYCH: AAOx3      LABS:  pending

## 2018-03-08 NOTE — H&P CARDIOLOGY - HISTORY OF PRESENT ILLNESS
73 yo Afro American female with PMHx of Asthma with frequent exacerbation, systolic CHF, s/p AICD, aflutter on Eliquis, T2DM(A1C 8.2 in 11/2017), diverticulitis, gout, HTN, HLD, COPD, hx of respiratory failure and trach who presented to [facility] on 3/7 c/o epigastric pain radiated to both arms. Troponin 0.028 <-0.045, WBC 8.7 H/H 11.2/33.2 plt 158, BUN/Cr 32/1.11, GFR 59, PT 15.8, INR 1.4, now transferred here for cardiac cath. Pt denies chest pain or SOB, ambulatory. pt's recent admission was in 1/2018.     CXR: no acute pathology is seen. CT head: no acute hemorrhage, hydrocephalus or territorial infarcts. 71 yo Afro American female with PMHx of Asthma with frequent exacerbation, systolic CHF, s/p AICD, aflutter on Eliquis, T2DM(A1C 7.6 in 3/1/2018 PCP Dr. Comer, [facility]), diverticulitis, gout, HTN, HLD, COPD, hx of respiratory failure and trach who presented to [facility] on 3/7 c/o epigastric pain radiated to both arms. Troponin 0.028 <-0.045, WBC 8.7 H/H 11.2/33.2 plt 158, BUN/Cr 32/1.11, GFR 59, PT 15.8, INR 1.4, now transferred here for cardiac cath. Pt denies chest pain or SOB, ambulatory. pt's recent admission was in 1/2018.     CXR: no acute pathology is seen. CT head: no acute hemorrhage, hydrocephalus or territorial infarcts. 71 yo Afro American female with PMHx of Asthma with frequent exacerbation, systolic CHF, s/p AICD, aflutter on Eliquis, T2DM(A1C 7.6 in 3/1/2018 PCP Dr. Comer, [facility]), diverticulitis, gout, HTN, HLD, COPD, hx of respiratory failure and trach who presented to [facility] on 3/7 c/o epigastric pain radiated to both arms. Troponin 0.028 <-0.045, WBC 8.7 H/H 11.2/33.2 plt 158, BUN/Cr 32/1.11, GFR 59, PT 15.8, INR 1.4, now transferred here for cardiac cath. Pt denies chest pain or SOB, ambulatory. pt's recent admission was in 1/2018.   Pt's Religious.   CXR: no acute pathology is seen. CT head: no acute hemorrhage, hydrocephalus or territorial infarcts. 71 yo Afro American female with PMHx of Asthma with frequent exacerbation, systolic CHF, s/p AICD(last checked in 1/24/2018 at [facility]), aflutter on Eliquis, T2DM(A1C 7.6 in 3/1/2018 PCP Dr. Comer, [facility]), diverticulitis, gout, HTN, HLD, COPD, hx of respiratory failure and trach who presented to [facility] on 3/7 c/o epigastric pain radiated to both arms. Troponin 0.028 <-0.045, WBC 8.7 H/H 11.2/33.2 plt 158, BUN/Cr 32/1.11, GFR 59, PT 15.8, INR 1.4, now transferred here for cardiac cath. Pt denies chest pain or SOB, ambulatory. pt's recent admission was in 1/2018.   Pt's Sikh.   CXR: no acute pathology is seen. CT head: no acute hemorrhage, hydrocephalus or territorial infarcts.

## 2018-03-08 NOTE — H&P CARDIOLOGY - PSH
AICD (Automatic Cardioverter/Defibrillator) Present  inserted in Aug, 2008. Due for battery change in 1 month. ( [employer]) . Inserted by Dr Duffy  S/P cholecystectomy

## 2018-03-09 ENCOUNTER — TRANSCRIPTION ENCOUNTER (OUTPATIENT)
Age: 73
End: 2018-03-09

## 2018-03-09 DIAGNOSIS — K21.9 GASTRO-ESOPHAGEAL REFLUX DISEASE WITHOUT ESOPHAGITIS: ICD-10-CM

## 2018-03-09 DIAGNOSIS — I50.9 HEART FAILURE, UNSPECIFIED: ICD-10-CM

## 2018-03-09 DIAGNOSIS — R07.89 OTHER CHEST PAIN: ICD-10-CM

## 2018-03-09 DIAGNOSIS — K59.00 CONSTIPATION, UNSPECIFIED: ICD-10-CM

## 2018-03-09 DIAGNOSIS — I50.22 CHRONIC SYSTOLIC (CONGESTIVE) HEART FAILURE: ICD-10-CM

## 2018-03-09 DIAGNOSIS — J45.909 UNSPECIFIED ASTHMA, UNCOMPLICATED: ICD-10-CM

## 2018-03-09 DIAGNOSIS — R73.9 HYPERGLYCEMIA, UNSPECIFIED: ICD-10-CM

## 2018-03-09 DIAGNOSIS — I48.92 UNSPECIFIED ATRIAL FLUTTER: ICD-10-CM

## 2018-03-09 DIAGNOSIS — E11.9 TYPE 2 DIABETES MELLITUS WITHOUT COMPLICATIONS: ICD-10-CM

## 2018-03-09 DIAGNOSIS — Z29.9 ENCOUNTER FOR PROPHYLACTIC MEASURES, UNSPECIFIED: ICD-10-CM

## 2018-03-09 LAB
ANION GAP SERPL CALC-SCNC: 16 MMOL/L — SIGNIFICANT CHANGE UP (ref 5–17)
BUN SERPL-MCNC: 43 MG/DL — HIGH (ref 7–23)
CALCIUM SERPL-MCNC: 9.6 MG/DL — SIGNIFICANT CHANGE UP (ref 8.4–10.5)
CHLORIDE SERPL-SCNC: 95 MMOL/L — LOW (ref 96–108)
CO2 SERPL-SCNC: 24 MMOL/L — SIGNIFICANT CHANGE UP (ref 22–31)
CREAT SERPL-MCNC: 1.22 MG/DL — SIGNIFICANT CHANGE UP (ref 0.5–1.3)
GLUCOSE BLDC GLUCOMTR-MCNC: 122 MG/DL — HIGH (ref 70–99)
GLUCOSE BLDC GLUCOMTR-MCNC: 165 MG/DL — HIGH (ref 70–99)
GLUCOSE BLDC GLUCOMTR-MCNC: 173 MG/DL — HIGH (ref 70–99)
GLUCOSE BLDC GLUCOMTR-MCNC: 215 MG/DL — HIGH (ref 70–99)
GLUCOSE SERPL-MCNC: 335 MG/DL — HIGH (ref 70–99)
HBA1C BLD-MCNC: 7 % — HIGH (ref 4–5.6)
HCT VFR BLD CALC: 36.8 % — SIGNIFICANT CHANGE UP (ref 34.5–45)
HGB BLD-MCNC: 12.7 G/DL — SIGNIFICANT CHANGE UP (ref 11.5–15.5)
MCHC RBC-ENTMCNC: 33.8 PG — SIGNIFICANT CHANGE UP (ref 27–34)
MCHC RBC-ENTMCNC: 34.5 GM/DL — SIGNIFICANT CHANGE UP (ref 32–36)
MCV RBC AUTO: 98.1 FL — SIGNIFICANT CHANGE UP (ref 80–100)
NT-PROBNP SERPL-SCNC: 120 PG/ML — SIGNIFICANT CHANGE UP (ref 0–300)
PLATELET # BLD AUTO: 195 K/UL — SIGNIFICANT CHANGE UP (ref 150–400)
POTASSIUM SERPL-MCNC: 4.9 MMOL/L — SIGNIFICANT CHANGE UP (ref 3.5–5.3)
POTASSIUM SERPL-SCNC: 4.9 MMOL/L — SIGNIFICANT CHANGE UP (ref 3.5–5.3)
RAPID RVP RESULT: SIGNIFICANT CHANGE UP
RBC # BLD: 3.75 M/UL — LOW (ref 3.8–5.2)
RBC # FLD: 12.3 % — SIGNIFICANT CHANGE UP (ref 10.3–14.5)
SODIUM SERPL-SCNC: 135 MMOL/L — SIGNIFICANT CHANGE UP (ref 135–145)
WBC # BLD: 12.3 K/UL — HIGH (ref 3.8–10.5)
WBC # FLD AUTO: 12.3 K/UL — HIGH (ref 3.8–10.5)

## 2018-03-09 PROCEDURE — 93306 TTE W/DOPPLER COMPLETE: CPT | Mod: 26

## 2018-03-09 PROCEDURE — 71250 CT THORAX DX C-: CPT | Mod: 26

## 2018-03-09 PROCEDURE — 99233 SBSQ HOSP IP/OBS HIGH 50: CPT

## 2018-03-09 RX ORDER — AZITHROMYCIN 500 MG/1
1 TABLET, FILM COATED ORAL
Qty: 0 | Refills: 0 | COMMUNITY

## 2018-03-09 RX ORDER — SENNA PLUS 8.6 MG/1
2 TABLET ORAL AT BEDTIME
Qty: 0 | Refills: 0 | Status: DISCONTINUED | OUTPATIENT
Start: 2018-03-09 | End: 2018-03-13

## 2018-03-09 RX ORDER — ALBUTEROL 90 UG/1
3 AEROSOL, METERED ORAL
Qty: 0 | Refills: 0 | COMMUNITY

## 2018-03-09 RX ORDER — MAGNESIUM OXIDE 400 MG ORAL TABLET 241.3 MG
800 TABLET ORAL ONCE
Qty: 0 | Refills: 0 | Status: COMPLETED | OUTPATIENT
Start: 2018-03-09 | End: 2018-03-09

## 2018-03-09 RX ORDER — ACETAMINOPHEN 500 MG
650 TABLET ORAL EVERY 6 HOURS
Qty: 0 | Refills: 0 | Status: DISCONTINUED | OUTPATIENT
Start: 2018-03-09 | End: 2018-03-13

## 2018-03-09 RX ORDER — POLYETHYLENE GLYCOL 3350 17 G/17G
17 POWDER, FOR SOLUTION ORAL DAILY
Qty: 0 | Refills: 0 | Status: DISCONTINUED | OUTPATIENT
Start: 2018-03-09 | End: 2018-03-13

## 2018-03-09 RX ORDER — APIXABAN 2.5 MG/1
5 TABLET, FILM COATED ORAL EVERY 12 HOURS
Qty: 0 | Refills: 0 | Status: DISCONTINUED | OUTPATIENT
Start: 2018-03-09 | End: 2018-03-13

## 2018-03-09 RX ORDER — IPRATROPIUM/ALBUTEROL SULFATE 18-103MCG
3 AEROSOL WITH ADAPTER (GRAM) INHALATION
Qty: 0 | Refills: 0 | COMMUNITY

## 2018-03-09 RX ORDER — FAMOTIDINE 10 MG/ML
20 INJECTION INTRAVENOUS
Qty: 0 | Refills: 0 | Status: DISCONTINUED | OUTPATIENT
Start: 2018-03-09 | End: 2018-03-13

## 2018-03-09 RX ORDER — DOCUSATE SODIUM 100 MG
100 CAPSULE ORAL THREE TIMES A DAY
Qty: 0 | Refills: 0 | Status: DISCONTINUED | OUTPATIENT
Start: 2018-03-09 | End: 2018-03-13

## 2018-03-09 RX ORDER — ATORVASTATIN CALCIUM 80 MG/1
1 TABLET, FILM COATED ORAL
Qty: 0 | Refills: 0 | COMMUNITY

## 2018-03-09 RX ORDER — IPRATROPIUM/ALBUTEROL SULFATE 18-103MCG
3 AEROSOL WITH ADAPTER (GRAM) INHALATION EVERY 6 HOURS
Qty: 0 | Refills: 0 | Status: DISCONTINUED | OUTPATIENT
Start: 2018-03-09 | End: 2018-03-13

## 2018-03-09 RX ADMIN — MAGNESIUM OXIDE 400 MG ORAL TABLET 800 MILLIGRAM(S): 241.3 TABLET ORAL at 21:31

## 2018-03-09 RX ADMIN — SENNA PLUS 2 TABLET(S): 8.6 TABLET ORAL at 21:32

## 2018-03-09 RX ADMIN — MAGNESIUM OXIDE 400 MG ORAL TABLET 400 MILLIGRAM(S): 241.3 TABLET ORAL at 06:36

## 2018-03-09 RX ADMIN — SPIRONOLACTONE 25 MILLIGRAM(S): 25 TABLET, FILM COATED ORAL at 06:22

## 2018-03-09 RX ADMIN — FAMOTIDINE 20 MILLIGRAM(S): 10 INJECTION INTRAVENOUS at 11:23

## 2018-03-09 RX ADMIN — Medication 3 MILLILITER(S): at 17:54

## 2018-03-09 RX ADMIN — MAGNESIUM OXIDE 400 MG ORAL TABLET 400 MILLIGRAM(S): 241.3 TABLET ORAL at 11:50

## 2018-03-09 RX ADMIN — Medication 600 MILLIGRAM(S): at 17:54

## 2018-03-09 RX ADMIN — MAGNESIUM OXIDE 400 MG ORAL TABLET 400 MILLIGRAM(S): 241.3 TABLET ORAL at 17:54

## 2018-03-09 RX ADMIN — TIOTROPIUM BROMIDE 1 CAPSULE(S): 18 CAPSULE ORAL; RESPIRATORY (INHALATION) at 11:23

## 2018-03-09 RX ADMIN — Medication 100 MILLIGRAM(S): at 21:32

## 2018-03-09 RX ADMIN — MONTELUKAST 10 MILLIGRAM(S): 4 TABLET, CHEWABLE ORAL at 11:23

## 2018-03-09 RX ADMIN — APIXABAN 5 MILLIGRAM(S): 2.5 TABLET, FILM COATED ORAL at 17:54

## 2018-03-09 RX ADMIN — Medication 10 MILLIGRAM(S): at 21:32

## 2018-03-09 RX ADMIN — FAMOTIDINE 20 MILLIGRAM(S): 10 INJECTION INTRAVENOUS at 17:54

## 2018-03-09 RX ADMIN — Medication 40 MILLIGRAM(S): at 06:23

## 2018-03-09 RX ADMIN — BUDESONIDE AND FORMOTEROL FUMARATE DIHYDRATE 2 PUFF(S): 160; 4.5 AEROSOL RESPIRATORY (INHALATION) at 06:22

## 2018-03-09 RX ADMIN — Medication 20 MILLIEQUIVALENT(S): at 11:23

## 2018-03-09 RX ADMIN — Medication 2: at 07:30

## 2018-03-09 RX ADMIN — Medication 1: at 11:23

## 2018-03-09 RX ADMIN — LORATADINE 10 MILLIGRAM(S): 10 TABLET ORAL at 11:50

## 2018-03-09 RX ADMIN — INSULIN GLARGINE 6 UNIT(S): 100 INJECTION, SOLUTION SUBCUTANEOUS at 21:32

## 2018-03-09 RX ADMIN — Medication 10 MILLIGRAM(S): at 11:23

## 2018-03-09 RX ADMIN — SODIUM CHLORIDE 3 MILLILITER(S): 9 INJECTION INTRAMUSCULAR; INTRAVENOUS; SUBCUTANEOUS at 21:27

## 2018-03-09 RX ADMIN — ATORVASTATIN CALCIUM 20 MILLIGRAM(S): 80 TABLET, FILM COATED ORAL at 21:32

## 2018-03-09 RX ADMIN — POLYETHYLENE GLYCOL 3350 17 GRAM(S): 17 POWDER, FOR SOLUTION ORAL at 17:53

## 2018-03-09 RX ADMIN — SODIUM CHLORIDE 3 MILLILITER(S): 9 INJECTION INTRAMUSCULAR; INTRAVENOUS; SUBCUTANEOUS at 11:11

## 2018-03-09 RX ADMIN — Medication 100 MILLIGRAM(S): at 17:53

## 2018-03-09 RX ADMIN — BUDESONIDE AND FORMOTEROL FUMARATE DIHYDRATE 2 PUFF(S): 160; 4.5 AEROSOL RESPIRATORY (INHALATION) at 17:54

## 2018-03-09 RX ADMIN — Medication 81 MILLIGRAM(S): at 11:23

## 2018-03-09 RX ADMIN — SODIUM CHLORIDE 3 MILLILITER(S): 9 INJECTION INTRAMUSCULAR; INTRAVENOUS; SUBCUTANEOUS at 06:35

## 2018-03-09 NOTE — DISCHARGE NOTE ADULT - CARE PROVIDERS DIRECT ADDRESSES
,[email or web address]

## 2018-03-09 NOTE — PROGRESS NOTE ADULT - SUBJECTIVE AND OBJECTIVE BOX
Patient is a 72y old  Female who presents with a chief complaint of       SUBJECTIVE / OVERNIGHT EVENTS:      MEDICATIONS  (STANDING):  apixaban 5 milliGRAM(s) Oral every 12 hours  aspirin enteric coated 81 milliGRAM(s) Oral daily  atorvastatin 20 milliGRAM(s) Oral at bedtime  buDESOnide 160 MICROgram(s)/formoterol 4.5 MICROgram(s) Inhaler 2 Puff(s) Inhalation two times a day  dextrose 5%. 1000 milliLiter(s) (50 mL/Hr) IV Continuous <Continuous>  dextrose 50% Injectable 12.5 Gram(s) IV Push once  dextrose 50% Injectable 25 Gram(s) IV Push once  dextrose 50% Injectable 25 Gram(s) IV Push once  docusate sodium 100 milliGRAM(s) Oral three times a day  enalapril 10 milliGRAM(s) Oral two times a day  famotidine    Tablet 20 milliGRAM(s) Oral two times a day  furosemide   Injectable 40 milliGRAM(s) IV Push daily  insulin glargine Injectable (LANTUS) 6 Unit(s) SubCutaneous at bedtime  insulin lispro (HumaLOG) corrective regimen sliding scale   SubCutaneous three times a day before meals  insulin lispro (HumaLOG) corrective regimen sliding scale   SubCutaneous at bedtime  loratadine 10 milliGRAM(s) Oral daily  magnesium oxide 400 milliGRAM(s) Oral three times a day with meals  montelukast 10 milliGRAM(s) Oral daily  polyethylene glycol 3350 17 Gram(s) Oral daily  potassium chloride   Solution 20 milliEquivalent(s) Oral daily  senna 2 Tablet(s) Oral at bedtime  sodium chloride 0.9% lock flush 3 milliLiter(s) IV Push every 8 hours  spironolactone 25 milliGRAM(s) Oral daily  tiotropium 18 MICROgram(s) Capsule 1 Capsule(s) Inhalation daily    MEDICATIONS  (PRN):  ALBUTerol    0.083% 2.5 milliGRAM(s) Nebulizer every 6 hours PRN Shortness of Breath and/or Wheezing  dextrose Gel 1 Dose(s) Oral once PRN Blood Glucose LESS THAN 70 milliGRAM(s)/deciliter  glucagon  Injectable 1 milliGRAM(s) IntraMuscular once PRN Glucose LESS THAN 70 milligrams/deciliter  guaiFENesin   Syrup  (Sugar-Free) 100 milliGRAM(s) Oral every 6 hours PRN Cough  sodium chloride 0.65% Nasal 1 Spray(s) Both Nostrils every 2 hours PRN Nasal Congestion      Vital Signs Last 24 Hrs  T(C): 36.3 (09 Mar 2018 13:13), Max: 36.7 (08 Mar 2018 19:50)  T(F): 97.4 (09 Mar 2018 13:13), Max: 98.1 (09 Mar 2018 06:17)  HR: 94 (09 Mar 2018 13:13) (87 - 99)  BP: 114/58 (09 Mar 2018 13:13) (102/50 - 135/70)  BP(mean): --  RR: 18 (09 Mar 2018 13:13) (17 - 18)  SpO2: 100% (09 Mar 2018 13:13) (98% - 100%)  CAPILLARY BLOOD GLUCOSE      POCT Blood Glucose.: 165 mg/dL (09 Mar 2018 11:06)  POCT Blood Glucose.: 215 mg/dL (09 Mar 2018 07:04)  POCT Blood Glucose.: 242 mg/dL (08 Mar 2018 21:25)  POCT Blood Glucose.: 295 mg/dL (08 Mar 2018 17:10)    I&O's Summary    08 Mar 2018 07:01  -  09 Mar 2018 07:00  --------------------------------------------------------  IN: 420 mL / OUT: 700 mL / NET: -280 mL    09 Mar 2018 07:01  -  09 Mar 2018 16:02  --------------------------------------------------------  IN: 320 mL / OUT: 0 mL / NET: 320 mL          PHYSICAL EXAM  GENERAL: NAD, well-developed  HEAD:  Atraumatic, Normocephalic  EYES: EOMI, PERRLA, conjunctiva and sclera clear  NECK: Supple, No JVD  CHEST/LUNG: Clear to auscultation bilaterally; No wheeze  HEART: Regular rate and rhythm; No murmurs, rubs, or gallops  ABDOMEN: Soft, Nontender, Nondistended; Bowel sounds present  EXTREMITIES:  2+ Peripheral Pulses, No clubbing, cyanosis, or edema  PSYCH: AAOx3  SKIN: No rashes or lesions      LABS:                        12.7   12.3  )-----------( 195      ( 09 Mar 2018 00:44 )             36.8     03-09    135  |  95<L>  |  43<H>  ----------------------------<  335<H>  4.9   |  24  |  1.22    Ca    9.6      09 Mar 2018 00:44    TPro  7.8  /  Alb  4.1  /  TBili  0.5  /  DBili  x   /  AST  16  /  ALT  14  /  AlkPhos  77  03-08              RADIOLOGY & ADDITIONAL TESTS:    Imaging Personally Reviewed:  Consultant(s) Notes Reviewed:    Care Discussed with Consultants/Other Providers: Patient is a 72y old  Female who presents with a chief complaint of chest pain.      SUBJECTIVE / OVERNIGHT EVENTS: Patient reports some constipation. She reports some right-lower chest pain. She has mild SOB/SILVA. She has LE edema. She has some indigestion.       MEDICATIONS  (STANDING):  apixaban 5 milliGRAM(s) Oral every 12 hours  aspirin enteric coated 81 milliGRAM(s) Oral daily  atorvastatin 20 milliGRAM(s) Oral at bedtime  buDESOnide 160 MICROgram(s)/formoterol 4.5 MICROgram(s) Inhaler 2 Puff(s) Inhalation two times a day  dextrose 5%. 1000 milliLiter(s) (50 mL/Hr) IV Continuous <Continuous>  dextrose 50% Injectable 12.5 Gram(s) IV Push once  dextrose 50% Injectable 25 Gram(s) IV Push once  dextrose 50% Injectable 25 Gram(s) IV Push once  docusate sodium 100 milliGRAM(s) Oral three times a day  enalapril 10 milliGRAM(s) Oral two times a day  famotidine    Tablet 20 milliGRAM(s) Oral two times a day  furosemide   Injectable 40 milliGRAM(s) IV Push daily  insulin glargine Injectable (LANTUS) 6 Unit(s) SubCutaneous at bedtime  insulin lispro (HumaLOG) corrective regimen sliding scale   SubCutaneous three times a day before meals  insulin lispro (HumaLOG) corrective regimen sliding scale   SubCutaneous at bedtime  loratadine 10 milliGRAM(s) Oral daily  magnesium oxide 400 milliGRAM(s) Oral three times a day with meals  montelukast 10 milliGRAM(s) Oral daily  polyethylene glycol 3350 17 Gram(s) Oral daily  potassium chloride   Solution 20 milliEquivalent(s) Oral daily  senna 2 Tablet(s) Oral at bedtime  sodium chloride 0.9% lock flush 3 milliLiter(s) IV Push every 8 hours  spironolactone 25 milliGRAM(s) Oral daily  tiotropium 18 MICROgram(s) Capsule 1 Capsule(s) Inhalation daily    MEDICATIONS  (PRN):  ALBUTerol    0.083% 2.5 milliGRAM(s) Nebulizer every 6 hours PRN Shortness of Breath and/or Wheezing  dextrose Gel 1 Dose(s) Oral once PRN Blood Glucose LESS THAN 70 milliGRAM(s)/deciliter  glucagon  Injectable 1 milliGRAM(s) IntraMuscular once PRN Glucose LESS THAN 70 milligrams/deciliter  guaiFENesin   Syrup  (Sugar-Free) 100 milliGRAM(s) Oral every 6 hours PRN Cough  sodium chloride 0.65% Nasal 1 Spray(s) Both Nostrils every 2 hours PRN Nasal Congestion      Vital Signs Last 24 Hrs  T(C): 36.3 (09 Mar 2018 13:13), Max: 36.7 (08 Mar 2018 19:50)  T(F): 97.4 (09 Mar 2018 13:13), Max: 98.1 (09 Mar 2018 06:17)  HR: 94 (09 Mar 2018 13:13) (87 - 99)  BP: 114/58 (09 Mar 2018 13:13) (102/50 - 135/70)  BP(mean): --  RR: 18 (09 Mar 2018 13:13) (17 - 18)  SpO2: 100% (09 Mar 2018 13:13) (98% - 100%)  CAPILLARY BLOOD GLUCOSE      POCT Blood Glucose.: 165 mg/dL (09 Mar 2018 11:06)  POCT Blood Glucose.: 215 mg/dL (09 Mar 2018 07:04)  POCT Blood Glucose.: 242 mg/dL (08 Mar 2018 21:25)  POCT Blood Glucose.: 295 mg/dL (08 Mar 2018 17:10)    I&O's Summary    08 Mar 2018 07:01  -  09 Mar 2018 07:00  --------------------------------------------------------  IN: 420 mL / OUT: 700 mL / NET: -280 mL    09 Mar 2018 07:01  -  09 Mar 2018 16:02  --------------------------------------------------------  IN: 320 mL / OUT: 0 mL / NET: 320 mL          PHYSICAL EXAM:  GENERAL: NAD, well-developed  HEAD:  Atraumatic, Normocephalic  EYES: EOMI, PERRLA, conjunctiva and sclera clear  NECK: Supple  CHEST/LUNG: Mild crackles in bases; right lower chest pain to palpation.   HEART: Regular rate and rhythm; No murmurs, rubs, or gallops  ABDOMEN: Soft, overweight, Nontender, Nondistended; Bowel sounds present  EXTREMITIES: 1+ edema in LE's.   PSYCH/Neuro: AAOx3. Non-focal.   SKIN: No rashes or lesions      LABS:                        12.7   12.3  )-----------( 195      ( 09 Mar 2018 00:44 )             36.8     03-09    135  |  95<L>  |  43<H>  ----------------------------<  335<H>  4.9   |  24  |  1.22    Ca    9.6      09 Mar 2018 00:44    TPro  7.8  /  Alb  4.1  /  TBili  0.5  /  DBili  x   /  AST  16  /  ALT  14  /  AlkPhos  77  03-08      < from: Cardiac Cath Lab - Adult (03.08.18 @ 12:45) >  CORONARY VESSELS: The coronary circulation is right dominant.  LM:   --  LM: Normal.  LAD:   --  LAD: Normal.  CX:   --  Circumflex: Normal.  RCA:   --  RCA: Normal.  COMPLICATIONS: There were no complications.  DIAGNOSTIC RECOMMENDATIONS: The patient should continue with the present  medications.    < end of copied text >      Telemetry reviewed: Vpaced 's. In the morning Aflutter 110-120's.   ?5 beats of WCT.       RADIOLOGY & ADDITIONAL TESTS:    Imaging Personally Reviewed: Cardiac cath report.   Consultant(s) Notes Reviewed: CHF   Care Discussed with Consultants/Other Providers: CHF fellow. Suki Pulmonary NP

## 2018-03-09 NOTE — DISCHARGE NOTE ADULT - PATIENT PORTAL LINK FT
You can access the [facility], offered by [facility], by registering with the following website: [email or web address]

## 2018-03-09 NOTE — DISCHARGE NOTE ADULT - ADDITIONAL INSTRUCTIONS
-Follow up with your cardiologist in 1-2 weeks of discharge.   -Follow up with your PMD in 1 week. -Follow up with your cardiologist in 1-2 weeks of discharge.   -Follow up with your PMD in 1 week.  -Follow up with your pulmonologist. -Follow up with  Heart failure clinic on 03/22/2018 at 1030 .  with NP Anny Lowe   -Follow up with your PMD in 1 week.  -Follow up with your pulmonologist.

## 2018-03-09 NOTE — DISCHARGE NOTE ADULT - CARE PROVIDER_API CALL
Balaji Hobson (MD; MPH), Adv Heart Fail Trnsplnt Cardio; Cardiology  [address] [place] [id number]  Phone: [phone]  Fax: [phone]    Jarred Duffy (MD), Internal Medicine  [address] [place] [id number]  Phone: [phone]  Fax: [phone] Balaji Hobson (MD; MPH), Adv Heart Fail Trnsplnt Cardio; Cardiology  [address] [place] [id number]  Phone: [phone]  Fax: [phone]    Jarred Duffy (MD), Internal Medicine  [address] [place] [id number]  Phone: [phone]  Fax: [phone]    Kirill Daily), Internal Medicine; Pulmonary Disease  [facility]  [address] [place] [address]  Phone: [phone]  Fax: [phone]

## 2018-03-09 NOTE — DISCHARGE NOTE ADULT - INSTRUCTIONS
1800 calorie diabetic diet/ low salt, low fat , low cholesterol follow up with your PMD as indicated

## 2018-03-09 NOTE — CONSULT NOTE ADULT - ASSESSMENT
71 y/o F with PMH asthma with frequent exacerbation (on oral steroids >5x per year), respiratory failure 2016 in the setting of Flu/PNA, HFrEF (mild LV dysfxn 2017), s/p AICD, aflutter on eliquis, T2DM, diverticulitis, gout, HTN, HLD, recent hospitalization with asthma exacerbation 2nd coronavirus (1/2018) presents to [facility] 3/7 with c/o acute epigastric pain/indigestion that radiated to both arms. ?NSTEMI and transferred to [facility] on 3/8 for cardiac cath. Cardiac cath with normal coronaries, LVedp: 17. Additionally patient was being treated at [facility] for asthma exacerbation 2nd ?URI with Prednisone 20mg qd, Azithromycin 250mg qd. Documentation from [place]: diffuse wheezing on exam with normal CXR. Patient denies CP, SOB to me. +cough with yellow phlegm. +sick contact-daughter. Exam with forced exp wheeze L, +erythema posterior pharynx. 98% on RA    Outpt pulm: Dr. Kirill Daily ([place])

## 2018-03-09 NOTE — DISCHARGE NOTE ADULT - NS AS ACTIVITY OBS
No Heavy lifting/straining/Walking-Indoors allowed Walking-Indoors allowed/Showering allowed/No Heavy lifting/straining

## 2018-03-09 NOTE — CONSULT NOTE ADULT - SUBJECTIVE AND OBJECTIVE BOX
HPI:  72 year old woman w PMHx of Asthma with frequent exacerbation, systolic CHFrEF (mild 4/2017 from mod-sev) LA 4.7, LVID 5.3, s/p CRT-D w gen change 11/2017, aflutter on Eliquis, T2DM(A1C 7.6 in 3/1/2018 PCP Dr. Comer, [facility]), diverticulitis, gout, HTN, HLD, COPD, hx of respiratory failure and trach who presented to [facility] on 3/7 c/o epigastric pain ("bad indigestion") and chest pressure radiated to both arms. Found NSTEMI and patient transferred for cardiac cath.    Pt's Ca[employer].     PMH:   Atrial flutter  Diverticulitis  Cardiomyopathy  Kidney stone  COPD (chronic obstructive pulmonary disease)  Cardiac Pacemaker  HTN - Hypertension  Gout  Diabetes  Congestive Heart Failure  Asthma    PSH:   S/P cholecystectomy  AICD (Automatic Cardioverter/Defibrillator) Present  S/P Cholecystectomy    Medications:   ALBUTerol    0.083% 2.5 milliGRAM(s) Nebulizer every 6 hours PRN  apixaban 5 milliGRAM(s) Oral every 12 hours  aspirin enteric coated 81 milliGRAM(s) Oral daily  atorvastatin 20 milliGRAM(s) Oral at bedtime  buDESOnide 160 MICROgram(s)/formoterol 4.5 MICROgram(s) Inhaler 2 Puff(s) Inhalation two times a day  dextrose 5%. 1000 milliLiter(s) IV Continuous <Continuous>  dextrose 50% Injectable 12.5 Gram(s) IV Push once  dextrose 50% Injectable 25 Gram(s) IV Push once  dextrose 50% Injectable 25 Gram(s) IV Push once  dextrose Gel 1 Dose(s) Oral once PRN  enalapril 10 milliGRAM(s) Oral two times a day  famotidine    Tablet 20 milliGRAM(s) Oral daily  furosemide   Injectable 40 milliGRAM(s) IV Push daily  glucagon  Injectable 1 milliGRAM(s) IntraMuscular once PRN  guaiFENesin   Syrup  (Sugar-Free) 100 milliGRAM(s) Oral every 6 hours PRN  insulin glargine Injectable (LANTUS) 6 Unit(s) SubCutaneous at bedtime  insulin lispro (HumaLOG) corrective regimen sliding scale   SubCutaneous three times a day before meals  insulin lispro (HumaLOG) corrective regimen sliding scale   SubCutaneous at bedtime  loratadine 10 milliGRAM(s) Oral daily  magnesium oxide 400 milliGRAM(s) Oral three times a day with meals  montelukast 10 milliGRAM(s) Oral daily  potassium chloride   Solution 20 milliEquivalent(s) Oral daily  sodium chloride 0.65% Nasal 1 Spray(s) Both Nostrils every 2 hours PRN  sodium chloride 0.9% lock flush 3 milliLiter(s) IV Push every 8 hours  spironolactone 25 milliGRAM(s) Oral daily  tiotropium 18 MICROgram(s) Capsule 1 Capsule(s) Inhalation daily    Allergies:  Coreg (Other)  digoxin (Other; Short breath (Mild to Mod))  penicillins (Hives)  Solu-Medrol (Other)    FAMILY HISTORY:  Family history of brain tumor (Mother)    Social History:  Smoking: never  Alcohol: never  Drugs: never    Review of Systems:  presenting symptoms resolved  [x ] 10 point review of systems is otherwise negative except as mentioned above               Physical Exam:  T(C): 36.7 (03-09-18 @ 06:17), Max: 37.1 (03-08-18 @ 13:30)  HR: 87 (03-09-18 @ 06:17) (81 - 99)  BP: 102/50 (03-09-18 @ 06:17) (101/55 - 135/70)  RR: 17 (03-09-18 @ 06:17) (17 - 18)  SpO2: 98% (03-09-18 @ 06:17) (94% - 100%)  Wt(kg): --    03-08 @ 07:01  -  03-09 @ 07:00  --------------------------------------------------------  IN: 420 mL / OUT: 700 mL / NET: -280 mL    03-09 @ 07:01  -  03-09 @ 13:19  --------------------------------------------------------  IN: 180 mL / OUT: 0 mL / NET: 180 mL      Daily     Daily     Appearance: [x ] Normal [ x] NAD [x] obese  Eyes: [x ] PERRL [x ] EOMI  HENT: [x ] Normal oral muscosa [ x]NC/AT  Cardiovascular: [x ] S1 [x ] S2 [x ] RRR [x ] No m/r/g [x ] ankle trace pitting edema [x ] mild elev JVD  Procedural Access Site: RRA pulse palpable, b/l hand strength equal, sensation equal  Respiratory: [x ] wheezing  Gastrointestinal: [x ] Soft [x ] Non-tender x[ ] Non-distended [x ] BS+  Neurologic: [x ] Non-focal  Psychiatry: [x ] AAOx3 [ x] Mood & affect appropriate  Skin: [ x] No rashes [ x] No ecchymoses [x ] No cyanosis    Cardiovascular Diagnostic Testing:  ECG: AS-BiV paced    Echo:  < from: Transthoracic Echocardiogram (04.24.17 @ 11:22) >  ------------------------------------------------------------------------  Dimensions:    Normal Values:  LA:     4.7    2.0 - 4.0 cm  Ao:     2.5    2.0 - 3.8 cm  SEPTUM: 0.8    0.6 - 1.2 cm  PWT:    0.8    0.6 - 1.1 cm  LVIDd:  5.3    3.0 - 5.6 cm  LVIDs:         1.8 - 4.0 cm  Derived variables:  LVMI: 83 g/m2  RWT: 0.30  Doppler Peak Velocity (m/sec): AoV=1.8  ------------------------------------------------------------------------    < end of copied text >  < from: Transthoracic Echocardiogram (04.24.17 @ 11:22) >  ------------------------------------------------------------------------  Conclusions:  1. Mild mitral annular calcification, otherwise normal  mitral valve. Minimal mitral regurgitation.  2. Aortic valve not well visualized. No aortic valve  regurgitation seen.  3. Endocardium not well visualized; grossly mild left  ventricular systolic dysfunction. The basal inferior, basal  inferoseptal, and inferolateral walls appear hypokinetic.  Paradoxical septal motion consistent with paced rhythm.  Repeat imaing with intravenous echo contrast could be  performed if clinically indicated.  4. Mild diastolic dysfunction (Stage I).  5. The right ventricle is not well visualized; grossly  normal right ventricular systolic function. A device wire  is noted in the right heart.  *** Compared with echocardiogram of 5/1/2016, overall LV  systolic function appears improved. LV systolic function  was grossly moderate to severely reduced on the prior  study.  ------------------------------------------------------------------------    < end of copied text >      < from: Cardiac Cath Lab - Adult (03.08.18 @ 12:45) >  PROCEDURE:  --  Left heart catheterization.  --  Left coronary angiography.  --  Right coronary angiography.  CORONARY VESSELS: The coronary circulation is right dominant.  LM:   --  LM: Normal.  LAD:   --  LAD: Normal.  CX:   --  Circumflex: Normal.  RCA:   --  RCA: Normal.  COMPLICATIONS: There were no complications.  Pressures:  -- Aortic Pressure (S/D/M): 131/69/97  Pressures:  -- Left Ventricle (s/edp): 131/17/--    < end of copied text >    Imaging:  < from: Xray Chest 1 View AP- PORTABLE-Urgent (01.23.18 @ 18:31) >    FINDINGS:  Left-sided AICD. Heart size cannot be determined on this AP projection.    Low lung volumes. No focal area of consolidation. No pneumothorax or   pleural effusions.    IMPRESSION: No focal area of consolidation.    < end of copied text >      Labs:                        12.7   12.3  )-----------( 195      ( 09 Mar 2018 00:44 )             36.8     03-09    135  |  95<L>  |  43<H>  ----------------------------<  335<H>  4.9   |  24  |  1.22    Ca    9.6      09 Mar 2018 00:44    TPro  7.8  /  Alb  4.1  /  TBili  0.5  /  DBili  x   /  AST  16  /  ALT  14  /  AlkPhos  77  03-08              Hemoglobin A1C, Whole Blood: 7.0 % (03-09 @ 02:24) HPI:  72 year old woman w PMHx of Asthma with frequent exacerbation, systolic CHFrEF (mild 4/2017 from mod-sev) LA 4.7, LVID 5.3, s/p CRT-D w gen change 11/2017, aflutter on Eliquis, T2DM(A1C 7.6 in 3/1/2018 PCP Dr. Comer, [facility]), diverticulitis, gout, HTN, HLD, COPD, hx of respiratory failure and trach who presented to [facility] on 3/7 c/o epigastric pain ("bad indigestion") and chest pressure radiated to both arms. Found NSTEMI and patient transferred for cardiac cath.    Pt's Muslim.     PMH:   Atrial flutter  Diverticulitis  Cardiomyopathy  Kidney stone  COPD (chronic obstructive pulmonary disease)  Cardiac Pacemaker  HTN - Hypertension  Gout  Diabetes  Congestive Heart Failure  Asthma    PSH:   S/P cholecystectomy  AICD (Automatic Cardioverter/Defibrillator) Present  S/P Cholecystectomy    home meds:  predniSONE 10 mg oral tablet  -- 2 tab (20mG) by mouth once a day for 2 days, then  1 tab (10mG) by mouth once a day for 2 days, then STOP   -- Indication: For Asthma exacerbation  spironolactone 25 mg oral tablet  -- 1 tab(s) by mouth once a day  -- Indication: For Congestive Heart Failure  aspirin 81 mg oral delayed release tablet  -- 1 tab(s) by mouth once a day  -- Indication: For Heart  enalapril  -- 15 milligram(s) by mouth once a day in the morning  -- Indication: For Congestive Heart Failure  enalapril  -- 10 milligram(s) by mouth once a day in the evening  -- Indication: For Congestive Heart Failure  apixaban 5 mg oral tablet  -- 1 tab(s) by mouth every 12 hours  -- Indication: For Atrial flutter  Prandin 0.5 mg oral tablet  -- 1 tab(s) by mouth 3 times a day (before meals) if fingerstick greater than 150.  2 tab(s) by mouth 3 times a day (before meals) if fingerstick greater than 220.  -- Indication: For Diabetes  Lantus 100 units/mL subcutaneous solution  -- unit(s) subcutaneous once a day (at bedtime) as follows:  When on 20mG Prednisone - take 10 units Lantus - on 1/27 amd 1/28  When on 10mG Prednisone - take 8 units Lantus - on 1/29 and 1/30  Continue taking your home dose of 8 units Lantus thereafter  ZyrTEC 10 mg oral tablet  -- 1 tab(s) by mouth once a day  -- Indication: For Asthma exacerbation  atorvastatin 20 mg oral tablet  -- 1 tab(s) by mouth once a day (at bedtime)  -- Indication: For Cholesterol  Ventolin HFA 90 mcg/inh inhalation aerosol  -- 2 puff(s) inhaled 4 times a day, As Needed  -- Indication: For Asthma exacerbation  tiotropium 18 mcg inhalation capsule  -- 1 cap(s) inhaled once a day  -- Indication: For Asthma exacerbation  budesonide-formoterol 160 mcg-4.5 mcg/inh inhalation aerosol  -- 2  inhaled 2 times a day  -- Indication: For Asthma exacerbation  furosemide 40 mg oral tablet  -- 1 tab(s) by mouth once a day  -- Indication: For Congestive Heart Failure  guaiFENesin 100 mg/5 mL oral liquid  -- 5 milliliter(s) by mouth every 6 hours, As Needed (sugar free)  -- Indication: For Asthma exacerbation  Pepcid AC 10 mg oral tablet  -- 1 tab(s) by mouth once a day  -- Indication: For Stomach  senna oral tablet  -- 2 tab(s) by mouth once a day (at bedtime)  -- Indication: For Bowel regimen  docusate sodium 100 mg oral capsule  -- 1 cap(s) by mouth 3 times a day  -- Indication: For Bowel regimen  montelukast 10 mg oral tablet  -- 1 tab(s) by mouth once a day  -- Indication: For Asthma exacerbation.       Medications:   ALBUTerol    0.083% 2.5 milliGRAM(s) Nebulizer every 6 hours PRN  apixaban 5 milliGRAM(s) Oral every 12 hours  aspirin enteric coated 81 milliGRAM(s) Oral daily  atorvastatin 20 milliGRAM(s) Oral at bedtime  buDESOnide 160 MICROgram(s)/formoterol 4.5 MICROgram(s) Inhaler 2 Puff(s) Inhalation two times a day  dextrose 5%. 1000 milliLiter(s) IV Continuous <Continuous>  dextrose 50% Injectable 12.5 Gram(s) IV Push once  dextrose 50% Injectable 25 Gram(s) IV Push once  dextrose 50% Injectable 25 Gram(s) IV Push once  dextrose Gel 1 Dose(s) Oral once PRN  enalapril 10 milliGRAM(s) Oral two times a day  famotidine    Tablet 20 milliGRAM(s) Oral daily  furosemide   Injectable 40 milliGRAM(s) IV Push daily  glucagon  Injectable 1 milliGRAM(s) IntraMuscular once PRN  guaiFENesin   Syrup  (Sugar-Free) 100 milliGRAM(s) Oral every 6 hours PRN  insulin glargine Injectable (LANTUS) 6 Unit(s) SubCutaneous at bedtime  insulin lispro (HumaLOG) corrective regimen sliding scale   SubCutaneous three times a day before meals  insulin lispro (HumaLOG) corrective regimen sliding scale   SubCutaneous at bedtime  loratadine 10 milliGRAM(s) Oral daily  magnesium oxide 400 milliGRAM(s) Oral three times a day with meals  montelukast 10 milliGRAM(s) Oral daily  potassium chloride   Solution 20 milliEquivalent(s) Oral daily  sodium chloride 0.65% Nasal 1 Spray(s) Both Nostrils every 2 hours PRN  sodium chloride 0.9% lock flush 3 milliLiter(s) IV Push every 8 hours  spironolactone 25 milliGRAM(s) Oral daily  tiotropium 18 MICROgram(s) Capsule 1 Capsule(s) Inhalation daily    Allergies:  Coreg (Other)  digoxin (Other; Short breath (Mild to Mod))  penicillins (Hives)  Solu-Medrol (Other)    FAMILY HISTORY:  Family history of brain tumor (Mother)    Social History:  Smoking: never  Alcohol: never  Drugs: never    Review of Systems:  presenting symptoms resolved  [x ] 10 point review of systems is otherwise negative except as mentioned above               Physical Exam:  T(C): 36.7 (03-09-18 @ 06:17), Max: 37.1 (03-08-18 @ 13:30)  HR: 87 (03-09-18 @ 06:17) (81 - 99)  BP: 102/50 (03-09-18 @ 06:17) (101/55 - 135/70)  RR: 17 (03-09-18 @ 06:17) (17 - 18)  SpO2: 98% (03-09-18 @ 06:17) (94% - 100%)  Wt(kg): --    03-08 @ 07:01  -  03-09 @ 07:00  --------------------------------------------------------  IN: 420 mL / OUT: 700 mL / NET: -280 mL    03-09 @ 07:01  -  03-09 @ 13:19  --------------------------------------------------------  IN: 180 mL / OUT: 0 mL / NET: 180 mL      Daily     Daily     Appearance: [x ] Normal [ x] NAD [x] obese  Eyes: [x ] PERRL [x ] EOMI  HENT: [x ] Normal oral muscosa [ x]NC/AT  Cardiovascular: [x ] S1 [x ] S2 [x ] RRR [x ] No m/r/g [x ] ankle trace pitting edema [x ] mild elev JVD  Procedural Access Site: RRA pulse palpable, b/l hand strength equal, sensation equal  Respiratory: [x ] wheezing  Gastrointestinal: [x ] Soft [x ] Non-tender x[ ] Non-distended [x ] BS+  Neurologic: [x ] Non-focal  Psychiatry: [x ] AAOx3 [ x] Mood & affect appropriate  Skin: [ x] No rashes [ x] No ecchymoses [x ] No cyanosis    Cardiovascular Diagnostic Testing:  ECG: AS-BiV paced    Echo:  < from: Transthoracic Echocardiogram (04.24.17 @ 11:22) >  ------------------------------------------------------------------------  Dimensions:    Normal Values:  LA:     4.7    2.0 - 4.0 cm  Ao:     2.5    2.0 - 3.8 cm  SEPTUM: 0.8    0.6 - 1.2 cm  PWT:    0.8    0.6 - 1.1 cm  LVIDd:  5.3    3.0 - 5.6 cm  LVIDs:         1.8 - 4.0 cm  Derived variables:  LVMI: 83 g/m2  RWT: 0.30  Doppler Peak Velocity (m/sec): AoV=1.8  ------------------------------------------------------------------------    < end of copied text >  < from: Transthoracic Echocardiogram (04.24.17 @ 11:22) >  ------------------------------------------------------------------------  Conclusions:  1. Mild mitral annular calcification, otherwise normal  mitral valve. Minimal mitral regurgitation.  2. Aortic valve not well visualized. No aortic valve  regurgitation seen.  3. Endocardium not well visualized; grossly mild left  ventricular systolic dysfunction. The basal inferior, basal  inferoseptal, and inferolateral walls appear hypokinetic.  Paradoxical septal motion consistent with paced rhythm.  Repeat imaing with intravenous echo contrast could be  performed if clinically indicated.  4. Mild diastolic dysfunction (Stage I).  5. The right ventricle is not well visualized; grossly  normal right ventricular systolic function. A device wire  is noted in the right heart.  *** Compared with echocardiogram of 5/1/2016, overall LV  systolic function appears improved. LV systolic function  was grossly moderate to severely reduced on the prior  study.  ------------------------------------------------------------------------    < end of copied text >      < from: Cardiac Cath Lab - Adult (03.08.18 @ 12:45) >  PROCEDURE:  --  Left heart catheterization.  --  Left coronary angiography.  --  Right coronary angiography.  CORONARY VESSELS: The coronary circulation is right dominant.  LM:   --  LM: Normal.  LAD:   --  LAD: Normal.  CX:   --  Circumflex: Normal.  RCA:   --  RCA: Normal.  COMPLICATIONS: There were no complications.  Pressures:  -- Aortic Pressure (S/D/M): 131/69/97  Pressures:  -- Left Ventricle (s/edp): 131/17/--    < end of copied text >    Imaging:  < from: Xray Chest 1 View AP- PORTABLE-Urgent (01.23.18 @ 18:31) >    FINDINGS:  Left-sided AICD. Heart size cannot be determined on this AP projection.    Low lung volumes. No focal area of consolidation. No pneumothorax or   pleural effusions.    IMPRESSION: No focal area of consolidation.    < end of copied text >      Labs:                        12.7   12.3  )-----------( 195      ( 09 Mar 2018 00:44 )             36.8     03-09    135  |  95<L>  |  43<H>  ----------------------------<  335<H>  4.9   |  24  |  1.22    Ca    9.6      09 Mar 2018 00:44    TPro  7.8  /  Alb  4.1  /  TBili  0.5  /  DBili  x   /  AST  16  /  ALT  14  /  AlkPhos  77  03-08              Hemoglobin A1C, Whole Blood: 7.0 % (03-09 @ 02:24)

## 2018-03-09 NOTE — DISCHARGE NOTE ADULT - CARE PLAN
Principal Discharge DX:	Acute on chronic heart failure, unspecified heart failure type  Goal:	You will not be short of breath.  Assessment and plan of treatment:	Take your medications as prescribed. Follow a low-salt, low salt, low cholesterol heart healthy diet. Weigh yourself every day and keep a record; call your doctor if you gain 2 pounds over one to two days or 5 pounds over three days. Get to or maintain a healthy weight; ask your heart failure team for referrals to a registered dietitian if needed. Avoid alcohol. Be active (check with your physician or cardiologist first). Find healthy ways to deal with stress, such as deep breathing, meditation, exercise, and doing hobbies that you enjoy. If you smoke, quit. (A resource to help you stop smoking is the [facility] [employer] Control – phone number [phone].).  Secondary Diagnosis:	HTN (hypertension), benign  Goal:	Your blood pressure will be controlled.  Assessment and plan of treatment:	Continue with your blood pressure medications; eat a heart healthy diet with low salt diet; exercise regularly (consult with your physician or cardiologist first); maintain a heart healthy weight; if you smoke - quit (A resource to help you stop smoking is the [facility] [employer] – phone number [phone].); include healthy ways to manage stress. Continue to follow with your primary care physician or cardiologist.  Secondary Diagnosis:	Type 2 diabetes mellitus with other circulatory complication, unspecified long term insulin use status  Goal:	Your hemoglobin A1C will be less than 7  Assessment and plan of treatment:	Continue to follow with your primary care MD or your endocrinologist.  Follow a heart healthy diabetic diet. If you check your fingerstick glucose at home, call your MD if it is greater than 250mg/dL on 2 occasions or less than 100mg/dL on 2 occasions. Know signs of low blood sugar, such as: dizziness, shakiness, sweating, confusion, hunger, nervousness-drink 4 ounces apple juice if occurs and call your doctor. Know early signs of high blood sugar, such as: frequent urination, increased thirst, blurry vision, fatigue, headache - call your doctor if this occurs. Follow with other practitioners to care for your diabetes, such as ophthamologist and podiatrist. Principal Discharge DX:	Acute on chronic heart failure, unspecified heart failure type  Goal:	You will not be short of breath.  Assessment and plan of treatment:	-please Follow up with  Heart failure clinic - appointment made for you on 03/22/2018 at 1030am. your medications were changed (spironolactone was lowered and lasix was increased)  Take your medications as prescribed. Follow a low-salt, low salt, low cholesterol heart healthy diet. Weigh yourself every day and keep a record; call your doctor if you gain 2 pounds over one to two days or 5 pounds over three days. Get to or maintain a healthy weight; ask your heart failure team for referrals to a registered dietitian if needed. Avoid alcohol. Be active (check with your physician or cardiologist first). Find healthy ways to deal with stress, such as deep breathing, meditation, exercise, and doing hobbies that you enjoy. If you smoke, quit. (A resource to help you stop smoking is the [facility] [employer] Control – phone number [phone]).  Secondary Diagnosis:	HTN (hypertension), benign  Goal:	Your blood pressure will be controlled.  Assessment and plan of treatment:	Continue with your blood pressure medications; eat a heart healthy diet with low salt diet; exercise regularly (consult with your physician or cardiologist first); maintain a heart healthy weight; if you smoke - quit (A resource to help you stop smoking is the [facility] [employer] Control – phone number [phone].); include healthy ways to manage stress. Continue to follow with your primary care physician or cardiologist.  Secondary Diagnosis:	Type 2 diabetes mellitus with other circulatory complication, unspecified long term insulin use status  Goal:	Your hemoglobin A1C will be less than 7  Assessment and plan of treatment:	Continue to follow with your primary care MD or your endocrinologist.  Follow a heart healthy diabetic diet. If you check your fingerstick glucose at home, call your MD if it is greater than 250mg/dL on 2 occasions or less than 100mg/dL on 2 occasions. Know signs of low blood sugar, such as: dizziness, shakiness, sweating, confusion, hunger, nervousness-drink 4 ounces apple juice if occurs and call your doctor. Know early signs of high blood sugar, such as: frequent urination, increased thirst, blurry vision, fatigue, headache - call your doctor if this occurs. Follow with other practitioners to care for your diabetes, such as ophthalmologist and podiatrist.  Secondary Diagnosis:	Asthma with acute exacerbation, unspecified asthma severity, unspecified whether persistent  Assessment and plan of treatment:	please take prednisone and azithromycin for one more day   please follow up with your pulmonary doctor - Dr. Daily within 2 weeks.

## 2018-03-09 NOTE — DISCHARGE NOTE ADULT - FINDINGS/TREATMENT
< from: Cardiac Cath Lab - Adult (03.08.18 @ 12:45) >    CORONARY VESSELS: The coronary circulation is right dominant.  LM:   --  LM: Normal.  LAD:   --  LAD: Normal.  CX:   --  Circumflex: Normal.  RCA:   --  RCA: Normal.  COMPLICATIONS: There were no complications.  DIAGNOSTIC RECOMMENDATIONS: The patient should continue with the present  medications.    < end of copied text >

## 2018-03-09 NOTE — DISCHARGE NOTE ADULT - HOSPITAL COURSE
73 yo Afro American female with PMHx of Asthma with frequent exacerbation, systolic CHF, s/p AICD(last checked in 1/24/2018 at [facility]), aflutter on Eliquis, T2DM(A1C 7.6 in 3/1/2018 PCP Dr. Comer, [facility]), diverticulitis, gout, HTN, HLD, COPD, hx of respiratory failure and trach who presented to [facility] on 3/7 c/o epigastric pain radiated to both arms. Troponin 0.028 <-0.045, WBC 8.7 H/H 11.2/33.2 plt 158, BUN/Cr 32/1.11, GFR 59, PT 15.8, INR 1.4, now transferred here for cardiac cath. Pt denies chest pain or SOB, ambulatory. pt's recent admission was in 1/2018.   Pt's Buddhist. 72F h/o Asthma with frequent exacerbation, chronic systolic CHF s/p AICD, Aflutter on eliquis, DM-2, diverticulitis, gout, HTN, HLD, COPD; transferred from [facility] with epigastric pain radiating to arms with elevated troponins at OSH now s/p cardiac cath at [facility] on 3/8/18 with clear coronaries; admitted for possible acute on chronic systolic heart failure and asthma exacerbation. patient initially diuresed with IV lasix with good response and later changed to oral lasix per heart failure team. patient to follow up with Heart failure clinic - appointment made. home lasix was increased and spironolactone decreased secondary to elevated potassium. patient also with asthma exacerbation likely related to recent viral infection. patient seen by pulmonary - Dr. Lamb who recommended prednisone for 5 days and azithromycin x 5 days. sputum culture revealed pseudomonas and ID was called. Dr. Cabral recommended no further antibiotics as pseudomonas is likely a colonizer with no active signs of infection. no events on telemetry. patient to follow up for ppm check. FS intermittently elevated but patient only has one more day of prednisone. patient will resume outpatient lantus and continue diabetic diet. patient to follow up with PMD, Heart failure, and pulmonologist as outpatient.

## 2018-03-09 NOTE — DISCHARGE NOTE ADULT - PROVIDER TOKENS
TOKALVARADO:'[id number]',TOKEN:'[id number]' TOKEN:'[id number]',TOKEN:'[id number]',[id number]'

## 2018-03-09 NOTE — CONSULT NOTE ADULT - ASSESSMENT
72 year old woman w PMHx of Asthma with frequent exacerbation, systolic CHFrEF (mild 4/2017 from mod-sev) LA 4.7, LVID 5.3, s/p CRT-D w gen change 11/2017, aflutter on Eliquis, T2DM(A1C 7.6 in 3/1/2018 PCP Dr. Comer, [facility]), diverticulitis, gout, HTN, HLD, COPD, hx of respiratory failure and trach who presented to [facility] on 3/7 c/o epigastric pain ("bad indigestion") and chest pressure radiated to both arms. Found NSTEMI and patient transferred for cardiac cath where found normal coronaries and LVEDP 17 w/o AV gradient.     normotensive  24h uop 700cc, -280cc balance 72 year old woman w PMHx of Asthma with frequent exacerbation, systolic CHFrEF (mild 4/2017 from mod-sev) LA 4.7, LVID 5.3, s/p CRT-D w gen change 11/2017, aflutter on Eliquis, T2DM(A1C 7.6 in 3/1/2018 PCP Dr. Comer, [facility]), diverticulitis, gout, HTN, HLD, COPD, hx of respiratory failure and trach who presented to [facility] on 3/7 c/o epigastric pain ("bad indigestion") and chest pressure radiated to both arms. Found NSTEMI and patient transferred for cardiac cath where found normal coronaries and LVEDP 17 w/o AV gradient. Additionally w asthma exacerbation setting coronavirus +.    normotensive  24h uop 700cc, -280cc balance

## 2018-03-09 NOTE — DISCHARGE NOTE ADULT - PLAN OF CARE
You will not be short of breath. Take your medications as prescribed. Follow a low-salt, low salt, low cholesterol heart healthy diet. Weigh yourself every day and keep a record; call your doctor if you gain 2 pounds over one to two days or 5 pounds over three days. Get to or maintain a healthy weight; ask your heart failure team for referrals to a registered dietitian if needed. Avoid alcohol. Be active (check with your physician or cardiologist first). Find healthy ways to deal with stress, such as deep breathing, meditation, exercise, and doing hobbies that you enjoy. If you smoke, quit. (A resource to help you stop smoking is the [facility] – phone number [phone].). Your blood pressure will be controlled. Continue with your blood pressure medications; eat a heart healthy diet with low salt diet; exercise regularly (consult with your physician or cardiologist first); maintain a heart healthy weight; if you smoke - quit (A resource to help you stop smoking is the [facility] – phone number [phone].); include healthy ways to manage stress. Continue to follow with your primary care physician or cardiologist. Your hemoglobin A1C will be less than 7 Continue to follow with your primary care MD or your endocrinologist.  Follow a heart healthy diabetic diet. If you check your fingerstick glucose at home, call your MD if it is greater than 250mg/dL on 2 occasions or less than 100mg/dL on 2 occasions. Know signs of low blood sugar, such as: dizziness, shakiness, sweating, confusion, hunger, nervousness-drink 4 ounces apple juice if occurs and call your doctor. Know early signs of high blood sugar, such as: frequent urination, increased thirst, blurry vision, fatigue, headache - call your doctor if this occurs. Follow with other practitioners to care for your diabetes, such as ophthamologist and podiatrist. -please Follow up with  Heart failure clinic - appointment made for you on 03/22/2018 at 1030am. your medications were changed (spironolactone was lowered and lasix was increased)  Take your medications as prescribed. Follow a low-salt, low salt, low cholesterol heart healthy diet. Weigh yourself every day and keep a record; call your doctor if you gain 2 pounds over one to two days or 5 pounds over three days. Get to or maintain a healthy weight; ask your heart failure team for referrals to a registered dietitian if needed. Avoid alcohol. Be active (check with your physician or cardiologist first). Find healthy ways to deal with stress, such as deep breathing, meditation, exercise, and doing hobbies that you enjoy. If you smoke, quit. (A resource to help you stop smoking is the [facility] – phone number [phone]). Continue to follow with your primary care MD or your endocrinologist.  Follow a heart healthy diabetic diet. If you check your fingerstick glucose at home, call your MD if it is greater than 250mg/dL on 2 occasions or less than 100mg/dL on 2 occasions. Know signs of low blood sugar, such as: dizziness, shakiness, sweating, confusion, hunger, nervousness-drink 4 ounces apple juice if occurs and call your doctor. Know early signs of high blood sugar, such as: frequent urination, increased thirst, blurry vision, fatigue, headache - call your doctor if this occurs. Follow with other practitioners to care for your diabetes, such as ophthalmologist and podiatrist. please take prednisone and azithromycin for one more day   please follow up with your pulmonary doctor - Dr. Daily within 2 weeks.

## 2018-03-09 NOTE — PROGRESS NOTE ADULT - SUBJECTIVE AND OBJECTIVE BOX
72y old  Female who presents with a chief complaint of epigastric pain radiating to both arms now s/p diagnostic cardiac cath via right radial artery access.         Allergies    Coreg (Other)  digoxin (Other; Short breath (Mild to Mod))  penicillins (Hives)  Solu-Medrol (Other)    Intolerances        Medications:  ALBUTerol    0.083% 2.5 milliGRAM(s) Nebulizer every 6 hours PRN  aspirin enteric coated 81 milliGRAM(s) Oral daily  atorvastatin 20 milliGRAM(s) Oral at bedtime  buDESOnide 160 MICROgram(s)/formoterol 4.5 MICROgram(s) Inhaler 2 Puff(s) Inhalation two times a day  dextrose 5%. 1000 milliLiter(s) IV Continuous <Continuous>  dextrose 50% Injectable 12.5 Gram(s) IV Push once  dextrose 50% Injectable 25 Gram(s) IV Push once  dextrose 50% Injectable 25 Gram(s) IV Push once  dextrose Gel 1 Dose(s) Oral once PRN  enalapril 10 milliGRAM(s) Oral two times a day  famotidine    Tablet 20 milliGRAM(s) Oral daily  furosemide   Injectable 40 milliGRAM(s) IV Push daily  glucagon  Injectable 1 milliGRAM(s) IntraMuscular once PRN  guaiFENesin   Syrup  (Sugar-Free) 100 milliGRAM(s) Oral every 6 hours PRN  insulin glargine Injectable (LANTUS) 6 Unit(s) SubCutaneous at bedtime  insulin lispro (HumaLOG) corrective regimen sliding scale   SubCutaneous three times a day before meals  insulin lispro (HumaLOG) corrective regimen sliding scale   SubCutaneous at bedtime  loratadine 10 milliGRAM(s) Oral daily  magnesium oxide 400 milliGRAM(s) Oral three times a day with meals  montelukast 10 milliGRAM(s) Oral daily  potassium chloride   Solution 20 milliEquivalent(s) Oral daily  sodium chloride 0.65% Nasal 1 Spray(s) Both Nostrils every 2 hours PRN  sodium chloride 0.9% lock flush 3 milliLiter(s) IV Push every 8 hours  spironolactone 25 milliGRAM(s) Oral daily  tiotropium 18 MICROgram(s) Capsule 1 Capsule(s) Inhalation daily      Vitals:  T(C): 36.7 (18 @ 19:50), Max: 37.1 (18 @ 13:30)  HR: 97 (18 @ 19:50) (81 - 99)  BP: 115/72 (18 @ 19:50) (101/55 - 135/70)  BP(mean): 83 (18 @ 11:22) (83 - 83)  RR: 18 (18 @ 19:50) (18 - 18)  SpO2: 99% (18 @ 19:50) (94% - 100%)  Wt(kg): --  Daily Height in cm: 157.48 (08 Mar 2018 11:35)    Daily Weight in k.9 (08 Mar 2018 11:22)  I&O's Summary    08 Mar 2018 07:01  -  09 Mar 2018 03:08  --------------------------------------------------------  IN: 240 mL / OUT: 700 mL / NET: -460 mL          Physical Exam:  Appearance: Normal  Eyes: PERRL, EOMI  HENT: Normal oral muscosa, NC/AT  Cardiovascular: S1S2, RRR, No M/R/G, no JVD, No Lower extremity edema  Procedural Access Site: No hematoma, Non-tender to palpation, 2+ pulse, No bruit, No Ecchymosis  Respiratory: Clear to auscultation bilaterally  Gastrointestinal: Soft, Non tender, Normal Bowel Sounds  Musculoskeletal: No clubbing, No joint deformity   Neurologic: Non-focal  Lymphatic: No lymphadenopathy  Psychiatry: AAOx3, Mood & affect appropriate  Skin: No rashes, No ecchymoses, No cyanosis        135  |  95<L>  |  43<H>  ----------------------------<  335<H>  4.9   |  24  |  1.22    Ca    9.6      09 Mar 2018 00:44    TPro  7.8  /  Alb  4.1  /  TBili  0.5  /  DBili  x   /  AST  16  /  ALT  14  /  AlkPhos  77        Interpretation of Telemetry:        ASSESSMENT/PLAN 72y old  Female who presents with a chief complaint of epigastric pain radiating to both arms now s/p diagnostic cardiac cath via right radial artery access.         Allergies    Coreg (Other)  digoxin (Other; Short breath (Mild to Mod))  penicillins (Hives)  Solu-Medrol (Other)    Intolerances        Medications:  ALBUTerol    0.083% 2.5 milliGRAM(s) Nebulizer every 6 hours PRN  aspirin enteric coated 81 milliGRAM(s) Oral daily  atorvastatin 20 milliGRAM(s) Oral at bedtime  buDESOnide 160 MICROgram(s)/formoterol 4.5 MICROgram(s) Inhaler 2 Puff(s) Inhalation two times a day  dextrose 5%. 1000 milliLiter(s) IV Continuous <Continuous>  dextrose 50% Injectable 12.5 Gram(s) IV Push once  dextrose 50% Injectable 25 Gram(s) IV Push once  dextrose 50% Injectable 25 Gram(s) IV Push once  dextrose Gel 1 Dose(s) Oral once PRN  enalapril 10 milliGRAM(s) Oral two times a day  famotidine    Tablet 20 milliGRAM(s) Oral daily  furosemide   Injectable 40 milliGRAM(s) IV Push daily  glucagon  Injectable 1 milliGRAM(s) IntraMuscular once PRN  guaiFENesin   Syrup  (Sugar-Free) 100 milliGRAM(s) Oral every 6 hours PRN  insulin glargine Injectable (LANTUS) 6 Unit(s) SubCutaneous at bedtime  insulin lispro (HumaLOG) corrective regimen sliding scale   SubCutaneous three times a day before meals  insulin lispro (HumaLOG) corrective regimen sliding scale   SubCutaneous at bedtime  loratadine 10 milliGRAM(s) Oral daily  magnesium oxide 400 milliGRAM(s) Oral three times a day with meals  montelukast 10 milliGRAM(s) Oral daily  potassium chloride   Solution 20 milliEquivalent(s) Oral daily  sodium chloride 0.65% Nasal 1 Spray(s) Both Nostrils every 2 hours PRN  sodium chloride 0.9% lock flush 3 milliLiter(s) IV Push every 8 hours  spironolactone 25 milliGRAM(s) Oral daily  tiotropium 18 MICROgram(s) Capsule 1 Capsule(s) Inhalation daily      Vitals:  T(C): 36.7 (18 @ 19:50), Max: 37.1 (18 @ 13:30)  HR: 97 (18 @ 19:50) (81 - 99)  BP: 115/72 (18 @ 19:50) (101/55 - 135/70)  BP(mean): 83 (18 @ 11:22) (83 - 83)  RR: 18 (18 @ 19:50) (18 - 18)  SpO2: 99% (18 @ 19:50) (94% - 100%)  Wt(kg): --  Daily Height in cm: 157.48 (08 Mar 2018 11:35)    Daily Weight in k.9 (08 Mar 2018 11:22)  I&O's Summary    08 Mar 2018 07:01  -  09 Mar 2018 03:08  --------------------------------------------------------  IN: 240 mL / OUT: 700 mL / NET: -460 mL          Physical Exam:  Appearance: Normal  Eyes: PERRL, EOMI  HENT: Normal oral muscosa, NC/AT  Cardiovascular: S1S2, RRR, No M/R/G, no JVD, No Lower extremity edema  Procedural Access Site: Right radial artery. No hematoma, Non-tender to palpation, 2+ pulse, No bruit, No Ecchymosis  Respiratory: Clear to auscultation bilaterally  Gastrointestinal: Soft, Non tender, Normal Bowel Sounds  Musculoskeletal: No clubbing, No joint deformity   Neurologic: Non-focal  Psychiatry: AAOx3, Mood & affect appropriate  Skin: No rashes, No ecchymoses, No cyanosis        135  |  95<L>  |  43<H>  ----------------------------<  335<H>  4.9   |  24  |  1.22    Ca    9.6      09 Mar 2018 00:44    TPro  7.8  /  Alb  4.1  /  TBili  0.5  /  DBili  x   /  AST  16  /  ALT  14  /  AlkPhos  77        Interpretation of Telemetry:        ASSESSMENT/PLAN   72y old  Female who presents with a chief complaint of epigastric pain radiating to both arms now s/p diagnostic cardiac cath via right radial artery access. Pt tolerated the procedure well. Cardiac cath site benign. Post-procedure discharge instructions discussed and questions addressed 72y old  Female who presents with a chief complaint of epigastric pain radiating to both arms now s/p diagnostic cardiac cath via right radial artery access.         Allergies    Coreg (Other)  digoxin (Other; Short breath (Mild to Mod))  penicillins (Hives)  Solu-Medrol (Other)    Intolerances        Medications:  ALBUTerol    0.083% 2.5 milliGRAM(s) Nebulizer every 6 hours PRN  aspirin enteric coated 81 milliGRAM(s) Oral daily  atorvastatin 20 milliGRAM(s) Oral at bedtime  buDESOnide 160 MICROgram(s)/formoterol 4.5 MICROgram(s) Inhaler 2 Puff(s) Inhalation two times a day  dextrose 5%. 1000 milliLiter(s) IV Continuous <Continuous>  dextrose 50% Injectable 12.5 Gram(s) IV Push once  dextrose 50% Injectable 25 Gram(s) IV Push once  dextrose 50% Injectable 25 Gram(s) IV Push once  dextrose Gel 1 Dose(s) Oral once PRN  enalapril 10 milliGRAM(s) Oral two times a day  famotidine    Tablet 20 milliGRAM(s) Oral daily  furosemide   Injectable 40 milliGRAM(s) IV Push daily  glucagon  Injectable 1 milliGRAM(s) IntraMuscular once PRN  guaiFENesin   Syrup  (Sugar-Free) 100 milliGRAM(s) Oral every 6 hours PRN  insulin glargine Injectable (LANTUS) 6 Unit(s) SubCutaneous at bedtime  insulin lispro (HumaLOG) corrective regimen sliding scale   SubCutaneous three times a day before meals  insulin lispro (HumaLOG) corrective regimen sliding scale   SubCutaneous at bedtime  loratadine 10 milliGRAM(s) Oral daily  magnesium oxide 400 milliGRAM(s) Oral three times a day with meals  montelukast 10 milliGRAM(s) Oral daily  potassium chloride   Solution 20 milliEquivalent(s) Oral daily  sodium chloride 0.65% Nasal 1 Spray(s) Both Nostrils every 2 hours PRN  sodium chloride 0.9% lock flush 3 milliLiter(s) IV Push every 8 hours  spironolactone 25 milliGRAM(s) Oral daily  tiotropium 18 MICROgram(s) Capsule 1 Capsule(s) Inhalation daily      Vitals:  T(C): 36.7 (18 @ 19:50), Max: 37.1 (18 @ 13:30)  HR: 97 (18 @ 19:50) (81 - 99)  BP: 115/72 (18 @ 19:50) (101/55 - 135/70)  BP(mean): 83 (18 @ 11:22) (83 - 83)  RR: 18 (18 @ 19:50) (18 - 18)  SpO2: 99% (18 @ 19:50) (94% - 100%)  Wt(kg): --  Daily Height in cm: 157.48 (08 Mar 2018 11:35)    Daily Weight in k.9 (08 Mar 2018 11:22)  I&O's Summary    08 Mar 2018 07:01  -  09 Mar 2018 03:08  --------------------------------------------------------  IN: 240 mL / OUT: 700 mL / NET: -460 mL          Physical Exam:  Appearance: Normal  Eyes: PERRL, EOMI  HENT: Normal oral muscosa, NC/AT  Cardiovascular: S1S2, RRR, No M/R/G, no JVD, No Lower extremity edema  Procedural Access Site: Right radial artery. No hematoma, Non-tender to palpation, 2+ pulse, No bruit, No Ecchymosis  Respiratory: Clear to auscultation bilaterally  Gastrointestinal: Soft, Non tender, Normal Bowel Sounds  Musculoskeletal: No clubbing, No joint deformity   Neurologic: Non-focal  Psychiatry: AAOx3, Mood & affect appropriate  Skin: No rashes, No ecchymoses, No cyanosis        135  |  95<L>  |  43<H>  ----------------------------<  335<H>  4.9   |  24  |  1.22    Ca    9.6      09 Mar 2018 00:44    TPro  7.8  /  Alb  4.1  /  TBili  0.5  /  DBili  x   /  AST  16  /  ALT  14  /  AlkPhos  77        Interpretation of Telemetry: SR 80-90 bpm         ASSESSMENT/PLAN   72y old  Female who presents with a chief complaint of epigastric pain radiating to both arms now s/p diagnostic cardiac cath via right radial artery access. Pt tolerated the procedure well. Cardiac cath site benign. Post-procedure discharge instructions discussed and questions addressed

## 2018-03-09 NOTE — DISCHARGE NOTE ADULT - MEDICATION SUMMARY - MEDICATIONS TO TAKE
I will START or STAY ON the medications listed below when I get home from the hospital:    predniSONE 20 mg oral tablet  -- 1 tab(s) by mouth once a day  -- Indication: For COPD (chronic obstructive pulmonary disease)    spironolactone 25 mg oral tablet  -- 0.5 tab(s) by mouth once a day   -- It is very important that you take or use this exactly as directed.  Do not skip doses or discontinue unless directed by your doctor.  May cause drowsiness or dizziness.    -- Indication: For Chronic systolic heart failure    aspirin 81 mg oral delayed release tablet  -- 1 tab(s) by mouth once a day   Home [facility] Hospital  -- Indication: For Heart protection    enalapril  -- 10 milligram(s) by mouth 2 times a day Home  -- Indication: For Chronic systolic heart failure    apixaban 5 mg oral tablet  -- 1 tab(s) by mouth every 12 hours  -- Indication: For Aflutter    Lantus 100 units/mL subcutaneous solution  -- 8 unit(s) subcutaneous once a day Home  -- Indication: For Type 2 diabetes mellitus    ZyrTEC 10 mg oral tablet  -- 1 tab(s) by mouth once a day Home [facility] Hospital  -- Indication: For Allergy    atorvastatin 20 mg oral tablet  -- 1 tab(s) by mouth once a day (at bedtime) Home   -- Indication: For Chronic systolic heart failure    Ventolin HFA 90 mcg/inh inhalation aerosol  -- 2 puff(s) inhaled 4 times a day, As Needed Home [facility] Hospital  -- Indication: For Asthma    tiotropium 18 mcg inhalation capsule  -- 1 cap(s) inhaled once a day Home [facility] Hospital  -- Indication: For Asthma    Symbicort 160 mcg-4.5 mcg/inh inhalation aerosol  -- 2 puff(s) inhaled 2 times a day  -- Indication: For Asthma    furosemide 40 mg oral tablet  -- 1 tab(s) by mouth 2 times a day  -- Indication: For Chronic systolic heart failure    guaiFENesin 100 mg/5 mL oral liquid  -- 5 milliliter(s) by mouth every 6 hours, As Needed (sugar free) Home  -- Indication: For Cough    Pepcid AC 10 mg oral tablet  -- 2 tab(s) by mouth once a day Home [facility] Hospital  -- Indication: For GERD (gastroesophageal reflux disease)    montelukast 10 mg oral tablet  -- 1 tab(s) by mouth once a day [facility] Hospital  -- Indication: For COPD (chronic obstructive pulmonary disease)    azithromycin 250 mg oral tablet  -- 1 tab(s) by mouth once a day  -- Indication: For COPD (chronic obstructive pulmonary disease)    magnesium oxide 500 mg oral tablet  -- 1 tab(s) by mouth 3 times a day  -- Indication: For supplement    potassium chloride 20 mEq/15 mL oral liquid  -- 5 milliliter(s) by mouth once a day Home  -- Indication: For supplement

## 2018-03-09 NOTE — DISCHARGE NOTE ADULT - SECONDARY DIAGNOSIS.
HTN (hypertension), benign Type 2 diabetes mellitus with other circulatory complication, unspecified long term insulin use status Asthma with acute exacerbation, unspecified asthma severity, unspecified whether persistent

## 2018-03-09 NOTE — CONSULT NOTE ADULT - PROBLEM SELECTOR RECOMMENDATION 9
-nyha III, stg C  -check TTE  -cont diuresis lasix 40mg IV daily  -cont home vasodilators  -asthma care per primary team and pulm (naeem Daily) -nyha III, stg C  -check TTE  -cont diuresis lasix 40mg IV daily  -cont home vasodilators  -consider cardiomems in future  -asthma care per primary team and pulm (naeem Daily)

## 2018-03-09 NOTE — CONSULT NOTE ADULT - SUBJECTIVE AND OBJECTIVE BOX
PULMONARY CONSULT      HPI: 71 y/o F with PMH uncontrolled asthma (on oral steroids >5x per year), respiratory failure 2016 in the setting of Flu/PNA, HFrEF (mild LV dysfxn 2017), s/p AICD, aflutter on eliquis, T2DM, diverticulitis, gout, HTN, HLD, recent hospitalization with asthma exacerbation 2nd coronavirus (1/2018) presents to [facility] 3/7 with c/o acute epigastric pain/indigestion that radiated to both arms. +NSTEMI and transferred to [facility] on 3/8 for cardiac cath. Cardiac cath with normal coronaries, LVedp: 17. Additionally patient was being treated at [facility] for asthma exacerbation 2nd ?URI with Prednisone 20mg qd, Azithromycin 250mg qd. Documentation from [facility]: diffuse wheezing on exam with normal CXR. Patient denies CP, SOB to me. +cough with yellow phlegm. +sick contact-daughter. Exam with forced exp wheeze L, +erythema posterior pharynx. 98% on RA    Pt's [employer].           PAST MEDICAL & SURGICAL HISTORY:  Atrial flutter  Diverticulitis  Cardiomyopathy  Kidney stone  COPD (chronic obstructive pulmonary disease)  Cardiac Pacemaker  HTN - Hypertension  Gout  Diabetes  Congestive Heart Failure  Asthma  S/P cholecystectomy  AICD (Automatic Cardioverter/Defibrillator) Present: inserted in Aug, 2008. Due for battery change in 1 month. ( [employer]) . Inserted by Dr Duffy    Allergies    Coreg (Other)  digoxin (Other; Short breath (Mild to Mod))  penicillins (Hives)  Solu-Medrol (Other)    Intolerances      FAMILY HISTORY:  Family history of brain tumor (Mother)    Social history: Neer Smoker     Review of Systems:  CONSTITUTIONAL: No fever, chills, or fatigue  EYES: No eye pain, visual disturbances, or discharge  ENMT:  No difficulty hearing, tinnitus, vertigo; No sinus or throat pain  NECK: No pain or stiffness  RESPIRATORY: Per above  CARDIOVASCULAR: No chest pain, palpitations, dizziness, or leg swelling  GASTROINTESTINAL: No abdominal or epigastric pain. No nausea, vomiting, or hematemesis; No diarrhea or constipation. No melena or hematochezia.  GENITOURINARY: No dysuria, frequency, hematuria, or incontinence  NEUROLOGICAL: No headaches, memory loss, loss of strength, numbness, or tremors  SKIN: No itching, burning, rashes, or lesions   MUSCULOSKELETAL: No joint pain or swelling; No muscle, back, or extremity pain  PSYCHIATRIC: No depression, anxiety, mood swings, or difficulty sleeping      Medications:  MEDICATIONS  (STANDING):  ALBUTerol/ipratropium for Nebulization 3 milliLiter(s) Nebulizer every 6 hours  apixaban 5 milliGRAM(s) Oral every 12 hours  aspirin enteric coated 81 milliGRAM(s) Oral daily  atorvastatin 20 milliGRAM(s) Oral at bedtime  buDESOnide 160 MICROgram(s)/formoterol 4.5 MICROgram(s) Inhaler 2 Puff(s) Inhalation two times a day  dextrose 5%. 1000 milliLiter(s) (50 mL/Hr) IV Continuous <Continuous>  dextrose 50% Injectable 12.5 Gram(s) IV Push once  dextrose 50% Injectable 25 Gram(s) IV Push once  dextrose 50% Injectable 25 Gram(s) IV Push once  docusate sodium 100 milliGRAM(s) Oral three times a day  enalapril 10 milliGRAM(s) Oral two times a day  famotidine    Tablet 20 milliGRAM(s) Oral two times a day  furosemide   Injectable 40 milliGRAM(s) IV Push daily  guaiFENesin  milliGRAM(s) Oral every 12 hours  insulin glargine Injectable (LANTUS) 6 Unit(s) SubCutaneous at bedtime  insulin lispro (HumaLOG) corrective regimen sliding scale   SubCutaneous three times a day before meals  insulin lispro (HumaLOG) corrective regimen sliding scale   SubCutaneous at bedtime  loratadine 10 milliGRAM(s) Oral daily  magnesium oxide 400 milliGRAM(s) Oral three times a day with meals  montelukast 10 milliGRAM(s) Oral daily  polyethylene glycol 3350 17 Gram(s) Oral daily  potassium chloride   Solution 20 milliEquivalent(s) Oral daily  senna 2 Tablet(s) Oral at bedtime  sodium chloride 0.9% lock flush 3 milliLiter(s) IV Push every 8 hours  spironolactone 25 milliGRAM(s) Oral daily  tiotropium 18 MICROgram(s) Capsule 1 Capsule(s) Inhalation daily    MEDICATIONS  (PRN):  acetaminophen   Tablet. 650 milliGRAM(s) Oral every 6 hours PRN Moderate Pain (4 - 6)  dextrose Gel 1 Dose(s) Oral once PRN Blood Glucose LESS THAN 70 milliGRAM(s)/deciliter  glucagon  Injectable 1 milliGRAM(s) IntraMuscular once PRN Glucose LESS THAN 70 milligrams/deciliter  sodium chloride 0.65% Nasal 1 Spray(s) Both Nostrils every 2 hours PRN Nasal Congestion            Vital Signs Last 24 Hrs  T(C): 36.3 (09 Mar 2018 13:13), Max: 36.7 (08 Mar 2018 19:50)  T(F): 97.4 (09 Mar 2018 13:13), Max: 98.1 (09 Mar 2018 06:17)  HR: 94 (09 Mar 2018 13:13) (87 - 99)  BP: 114/58 (09 Mar 2018 13:13) (102/50 - 135/70)  BP(mean): --  RR: 18 (09 Mar 2018 13:13) (17 - 18)  SpO2: 100% (09 Mar 2018 13:13) (98% - 100%) on RA            03-08 @ 07:01  -  03-09 @ 07:00  --------------------------------------------------------  IN: 420 mL / OUT: 700 mL / NET: -280 mL          LABS:                        12.7   12.3  )-----------( 195      ( 09 Mar 2018 00:44 )             36.8     03-09    135  |  95<L>  |  43<H>  ----------------------------<  335<H>  4.9   |  24  |  1.22    Ca    9.6      09 Mar 2018 00:44    TPro  7.8  /  Alb  4.1  /  TBili  0.5  /  DBili  x   /  AST  16  /  ALT  14  /  AlkPhos  77  03-08          CAPILLARY BLOOD GLUCOSE      POCT Blood Glucose.: 165 mg/dL (09 Mar 2018 11:06)          Serum Pro-Brain Natriuretic Peptide: 120 pg/mL (03-09-18 @ 00:44)          Physical Examination:    General: No acute distress.      HEENT: Pupils equal, reactive to light.  Symmetric.    PULM: Forced exp wheeze L    CVS: S1, S2 RRR    ABD: Soft, nondistended, nontender, normoactive bowel sounds, no masses    EXT: +LE edema    SKIN: Warm and well perfused, no rashes noted.    NEURO: Alert, oriented, interactive, nonfocal    RADIOLOGY REVIEWED  Pending CT chest PULMONARY CONSULT      HPI: 71 y/o F with PMH uncontrolled asthma (on oral steroids >5x per year), respiratory failure 2016 in the setting of Flu/PNA, HFrEF (mild LV dysfxn 2017), s/p AICD, aflutter on eliquis, T2DM, diverticulitis, gout, HTN, HLD, recent hospitalization with asthma exacerbation 2nd coronavirus (1/2018) presents to [facility] 3/7 with c/o acute epigastric pain/indigestion that radiated to both arms. +NSTEMI and transferred to [facility] on 3/8 for cardiac cath. Cardiac cath with normal coronaries, LVedp: 17. Additionally patient was being treated at [facility] for asthma exacerbation 2nd ?URI with Prednisone 20mg qd, Azithromycin 250mg qd. Documentation from [facility]: diffuse wheezing on exam with normal CXR. Patient denies CP, SOB to me. +cough with yellow phlegm. +sick contact-daughter. Exam with forced exp wheeze L, +erythema posterior pharynx. 98% on RA    Pt's [employer].     PAST MEDICAL & SURGICAL HISTORY:  Atrial flutter  Diverticulitis  Cardiomyopathy  Kidney stone  COPD (chronic obstructive pulmonary disease)  Cardiac Pacemaker  HTN - Hypertension  Gout  Diabetes  Congestive Heart Failure  Asthma  S/P cholecystectomy  AICD (Automatic Cardioverter/Defibrillator) Present: inserted in Aug, 2008. Due for battery change in 1 month. ( [employer]) . Inserted by Dr Duffy    Allergies    Coreg (Other)  digoxin (Other; Short breath (Mild to Mod))  penicillins (Hives)  Solu-Medrol (Other)    Intolerances      FAMILY HISTORY:  Family history of brain tumor (Mother)    Social history: Neer Smoker     Review of Systems:  CONSTITUTIONAL: No fever, chills, or fatigue  EYES: No eye pain, visual disturbances, or discharge  ENMT:  No difficulty hearing, tinnitus, vertigo; No sinus or throat pain  NECK: No pain or stiffness  RESPIRATORY: Per above  CARDIOVASCULAR: No chest pain, palpitations, dizziness, or leg swelling  GASTROINTESTINAL: No abdominal or epigastric pain. No nausea, vomiting, or hematemesis; No diarrhea or constipation. No melena or hematochezia.  GENITOURINARY: No dysuria, frequency, hematuria, or incontinence  NEUROLOGICAL: No headaches, memory loss, loss of strength, numbness, or tremors  SKIN: No itching, burning, rashes, or lesions   MUSCULOSKELETAL: No joint pain or swelling; No muscle, back, or extremity pain  PSYCHIATRIC: No depression, anxiety, mood swings, or difficulty sleeping      Medications:  MEDICATIONS  (STANDING):  ALBUTerol/ipratropium for Nebulization 3 milliLiter(s) Nebulizer every 6 hours  apixaban 5 milliGRAM(s) Oral every 12 hours  aspirin enteric coated 81 milliGRAM(s) Oral daily  atorvastatin 20 milliGRAM(s) Oral at bedtime  buDESOnide 160 MICROgram(s)/formoterol 4.5 MICROgram(s) Inhaler 2 Puff(s) Inhalation two times a day  dextrose 5%. 1000 milliLiter(s) (50 mL/Hr) IV Continuous <Continuous>  dextrose 50% Injectable 12.5 Gram(s) IV Push once  dextrose 50% Injectable 25 Gram(s) IV Push once  dextrose 50% Injectable 25 Gram(s) IV Push once  docusate sodium 100 milliGRAM(s) Oral three times a day  enalapril 10 milliGRAM(s) Oral two times a day  famotidine    Tablet 20 milliGRAM(s) Oral two times a day  furosemide   Injectable 40 milliGRAM(s) IV Push daily  guaiFENesin  milliGRAM(s) Oral every 12 hours  insulin glargine Injectable (LANTUS) 6 Unit(s) SubCutaneous at bedtime  insulin lispro (HumaLOG) corrective regimen sliding scale   SubCutaneous three times a day before meals  insulin lispro (HumaLOG) corrective regimen sliding scale   SubCutaneous at bedtime  loratadine 10 milliGRAM(s) Oral daily  magnesium oxide 400 milliGRAM(s) Oral three times a day with meals  montelukast 10 milliGRAM(s) Oral daily  polyethylene glycol 3350 17 Gram(s) Oral daily  potassium chloride   Solution 20 milliEquivalent(s) Oral daily  senna 2 Tablet(s) Oral at bedtime  sodium chloride 0.9% lock flush 3 milliLiter(s) IV Push every 8 hours  spironolactone 25 milliGRAM(s) Oral daily  tiotropium 18 MICROgram(s) Capsule 1 Capsule(s) Inhalation daily    MEDICATIONS  (PRN):  acetaminophen   Tablet. 650 milliGRAM(s) Oral every 6 hours PRN Moderate Pain (4 - 6)  dextrose Gel 1 Dose(s) Oral once PRN Blood Glucose LESS THAN 70 milliGRAM(s)/deciliter  glucagon  Injectable 1 milliGRAM(s) IntraMuscular once PRN Glucose LESS THAN 70 milligrams/deciliter  sodium chloride 0.65% Nasal 1 Spray(s) Both Nostrils every 2 hours PRN Nasal Congestion    Vital Signs Last 24 Hrs  T(C): 36.3 (09 Mar 2018 13:13), Max: 36.7 (08 Mar 2018 19:50)  T(F): 97.4 (09 Mar 2018 13:13), Max: 98.1 (09 Mar 2018 06:17)  HR: 94 (09 Mar 2018 13:13) (87 - 99)  BP: 114/58 (09 Mar 2018 13:13) (102/50 - 135/70)  BP(mean): --  RR: 18 (09 Mar 2018 13:13) (17 - 18)  SpO2: 100% (09 Mar 2018 13:13) (98% - 100%) on RA    03-08 @ 07:01  -  03-09 @ 07:00  --------------------------------------------------------  IN: 420 mL / OUT: 700 mL / NET: -280 mL    LABS:                        12.7   12.3  )-----------( 195      ( 09 Mar 2018 00:44 )             36.8     03-09    135  |  95<L>  |  43<H>  ----------------------------<  335<H>  4.9   |  24  |  1.22    Ca    9.6      09 Mar 2018 00:44    TPro  7.8  /  Alb  4.1  /  TBili  0.5  /  DBili  x   /  AST  16  /  ALT  14  /  AlkPhos  77  03-08          CAPILLARY BLOOD GLUCOSE      POCT Blood Glucose.: 165 mg/dL (09 Mar 2018 11:06)    Serum Pro-Brain Natriuretic Peptide: 120 pg/mL (03-09-18 @ 00:44)    Physical Examination:    General: No acute distress.      HEENT: Pupils equal, reactive to light.  Symmetric.    PULM: Forced exp wheeze L    CVS: S1, S2 RRR    ABD: Soft, nondistended, nontender, normoactive bowel sounds, no masses    EXT: +LE edema    SKIN: Warm and well perfused, no rashes noted.    NEURO: Alert, oriented, interactive, nonfocal    RADIOLOGY REVIEWED  Pending CT chest

## 2018-03-09 NOTE — PROVIDER CONTACT NOTE (OTHER) - ACTION/TREATMENT ORDERED:
As per NP, patient has a history of this and patient comfortable with no issues, no treatment/action necessary.

## 2018-03-09 NOTE — CONSULT NOTE ADULT - PROBLEM SELECTOR RECOMMENDATION 2
-Possible NSTEMI  -s/p LHC with normal coronaries, LVedp: 17  -c/w Lasix 40mg IV daily per HF team  -Pending TTE

## 2018-03-09 NOTE — CONSULT NOTE ADULT - PROBLEM SELECTOR RECOMMENDATION 9
Per documentation at [facility]: diffusely wheezing, started on Prednisone 20mg qd/Azithromycin for asthma exacerbation.   -Exam 3/9: forced exp wheeze only, normoxic 98% on RA  -Check RVP   -Check procalcitonin   -Pending CT chest to eval lung parenchyma   -Duoneb q6  -Symbicort BID  -Singulair qd  -Hold on further prednisone for now  -Unclear source of her presenting symptoms of epigastric pain at this point

## 2018-03-09 NOTE — PROGRESS NOTE ADULT - PROBLEM SELECTOR PLAN 2
-CHF recs appreciated.   -BNP normal, so unlikely CHF exacerbation.   -C/w lasix 40mg IV push daily for now inpatient, eventually will switch to PO (lasix PO 40mg twice daily) closer to DC.  -C/w enalapril 10mg twice daily.  -Echo ordered.  -Due for ICD interrogation as outpatient on Monday. -C/w telemetry.  -Monitor I's/O's.

## 2018-03-09 NOTE — CONSULT NOTE ADULT - ATTENDING COMMENTS
72yrs, HFrEF with improving LV function on last echo April 2017.  s/p CRTD. Aflutter on eloquis. DM. Asthma.   followed by Dr Hobson. One admission for HF in Nov 2017. Admission for asthma Noah  Epigastic pain. ?  NSTEMI.   Cath 3.8: Normal cors. LVEDP 17.   Home meds: Enalapril 15/10, Ald 25, ASA 81, epixiban, atorva, Pred 20 taper, lasix 40 po.  Since here: Lasix 40 IV daily. Some improvement.   normotensive. I=O    135  |  95<L>  |  43<H>  ----------------------------<  335<H>  4.9   |  24  |  1.22    Ca    9.6      09 Mar 2018 00:44    TPro  7.8  /  Alb  4.1  /  TBili  0.5  /  DBili  x   /  AST  16  /  ALT  14  /  AlkPhos  77  03-08                          12.7   12.3  )-----------( 195      ( 09 Mar 2018 00:44 )             36.8   NT proBNP 120  EKG: A sense BiV paced. 72yrs, HFrEF with improving LV function on last echo April 2017.  s/p CRTD. Aflutter on eloquis. DM. Asthma.   followed by Dr Hobson. One admission for HF in Nov 2017. Admission for asthma [facility]  Epigastic pain. ?  NSTEMI.  Cath 3.8: Normal cors. LVEDP 17.   Home meds: Enalapril 15/10, Ald 25, ASA 81, epixiban, atorva, Pred 20 taper, lasix 40 po.  Since here: Lasix 40 IV daily. Some improvement.   normotensive. I=O  no obvious JVP. good AE, scattered wheeze. LE edema +1  135  |  95<L>  |  43<H>  ----------------------------<  335<H>  4.9   |  24  |  1.22    Ca    9.6      09 Mar 2018 00:44    TPro  7.8  /  Alb  4.1  /  TBili  0.5  /  DBili  x   /  AST  16  /  ALT  14  /  AlkPhos  77  03-08                          12.7   12.3  )-----------( 195      ( 09 Mar 2018 00:44 )             36.8   NT proBNP 120  EKG: A sense BiV paced.  72yrs transferred for CP with cath showing normal cors and compensated filling pressures.  no need for ongoing admission from HF perspective.  symptoms consistent with exacerbation of airways disease.  Suggest:  CXR PA/LAT.  Resume outpatient HF medications. Continue IV lasix while in hospital and Lasix 40 po BID on d/c while on prednisone boost.  Nathan Wayne

## 2018-03-09 NOTE — DISCHARGE NOTE ADULT - OTHER SIGNIFICANT FINDINGS
< from: TTE with Doppler (w/Cont) (03.09.18 @ 15:47) >  Conclusions:  1. Endocardial visualization enhanced with intravenous  injection of echocontrast (Definity).  Despite the use of  echo contrast the visualization endocardium was still  suboptimal. At least, Moderate-severe segmental left  ventricular systolic dysfunction. Unable to calculate EF  accurately The  apical cap are akinetic.  *** Compared with echocardiogram of 4/24/2017, difficult to  compare- LV function similar.    < end of copied text >

## 2018-03-10 LAB
ANION GAP SERPL CALC-SCNC: 13 MMOL/L — SIGNIFICANT CHANGE UP (ref 5–17)
BUN SERPL-MCNC: 55 MG/DL — HIGH (ref 7–23)
CALCIUM SERPL-MCNC: 9.3 MG/DL — SIGNIFICANT CHANGE UP (ref 8.4–10.5)
CHLORIDE SERPL-SCNC: 96 MMOL/L — SIGNIFICANT CHANGE UP (ref 96–108)
CO2 SERPL-SCNC: 26 MMOL/L — SIGNIFICANT CHANGE UP (ref 22–31)
CREAT SERPL-MCNC: 1.3 MG/DL — SIGNIFICANT CHANGE UP (ref 0.5–1.3)
GLUCOSE BLDC GLUCOMTR-MCNC: 121 MG/DL — HIGH (ref 70–99)
GLUCOSE BLDC GLUCOMTR-MCNC: 150 MG/DL — HIGH (ref 70–99)
GLUCOSE BLDC GLUCOMTR-MCNC: 191 MG/DL — HIGH (ref 70–99)
GLUCOSE BLDC GLUCOMTR-MCNC: 225 MG/DL — HIGH (ref 70–99)
GLUCOSE SERPL-MCNC: 136 MG/DL — HIGH (ref 70–99)
GRAM STN FLD: SIGNIFICANT CHANGE UP
HCT VFR BLD CALC: 36.5 % — SIGNIFICANT CHANGE UP (ref 34.5–45)
HGB BLD-MCNC: 12.2 G/DL — SIGNIFICANT CHANGE UP (ref 11.5–15.5)
MAGNESIUM SERPL-MCNC: 2.2 MG/DL — SIGNIFICANT CHANGE UP (ref 1.6–2.6)
MCHC RBC-ENTMCNC: 33.2 PG — SIGNIFICANT CHANGE UP (ref 27–34)
MCHC RBC-ENTMCNC: 33.4 GM/DL — SIGNIFICANT CHANGE UP (ref 32–36)
MCV RBC AUTO: 99.3 FL — SIGNIFICANT CHANGE UP (ref 80–100)
PHOSPHATE SERPL-MCNC: 3.6 MG/DL — SIGNIFICANT CHANGE UP (ref 2.5–4.5)
PLATELET # BLD AUTO: 185 K/UL — SIGNIFICANT CHANGE UP (ref 150–400)
POTASSIUM SERPL-MCNC: 5.1 MMOL/L — SIGNIFICANT CHANGE UP (ref 3.5–5.3)
POTASSIUM SERPL-SCNC: 5.1 MMOL/L — SIGNIFICANT CHANGE UP (ref 3.5–5.3)
PROCALCITONIN SERPL-MCNC: <0.05 NG/ML — SIGNIFICANT CHANGE UP (ref 0–0.04)
RBC # BLD: 3.68 M/UL — LOW (ref 3.8–5.2)
RBC # FLD: 12.5 % — SIGNIFICANT CHANGE UP (ref 10.3–14.5)
SODIUM SERPL-SCNC: 135 MMOL/L — SIGNIFICANT CHANGE UP (ref 135–145)
SPECIMEN SOURCE: SIGNIFICANT CHANGE UP
WBC # BLD: 10.2 K/UL — SIGNIFICANT CHANGE UP (ref 3.8–10.5)
WBC # FLD AUTO: 10.2 K/UL — SIGNIFICANT CHANGE UP (ref 3.8–10.5)

## 2018-03-10 PROCEDURE — 99233 SBSQ HOSP IP/OBS HIGH 50: CPT | Mod: GC

## 2018-03-10 RX ORDER — FUROSEMIDE 40 MG
40 TABLET ORAL DAILY
Qty: 0 | Refills: 0 | Status: DISCONTINUED | OUTPATIENT
Start: 2018-03-10 | End: 2018-03-12

## 2018-03-10 RX ORDER — AZITHROMYCIN 500 MG/1
500 TABLET, FILM COATED ORAL ONCE
Qty: 0 | Refills: 0 | Status: COMPLETED | OUTPATIENT
Start: 2018-03-10 | End: 2018-03-10

## 2018-03-10 RX ORDER — AZITHROMYCIN 500 MG/1
250 TABLET, FILM COATED ORAL DAILY
Qty: 0 | Refills: 0 | Status: DISCONTINUED | OUTPATIENT
Start: 2018-03-11 | End: 2018-03-13

## 2018-03-10 RX ADMIN — SODIUM CHLORIDE 3 MILLILITER(S): 9 INJECTION INTRAMUSCULAR; INTRAVENOUS; SUBCUTANEOUS at 19:48

## 2018-03-10 RX ADMIN — APIXABAN 5 MILLIGRAM(S): 2.5 TABLET, FILM COATED ORAL at 17:12

## 2018-03-10 RX ADMIN — BUDESONIDE AND FORMOTEROL FUMARATE DIHYDRATE 2 PUFF(S): 160; 4.5 AEROSOL RESPIRATORY (INHALATION) at 18:19

## 2018-03-10 RX ADMIN — AZITHROMYCIN 500 MILLIGRAM(S): 500 TABLET, FILM COATED ORAL at 16:35

## 2018-03-10 RX ADMIN — Medication 600 MILLIGRAM(S): at 05:10

## 2018-03-10 RX ADMIN — BUDESONIDE AND FORMOTEROL FUMARATE DIHYDRATE 2 PUFF(S): 160; 4.5 AEROSOL RESPIRATORY (INHALATION) at 05:08

## 2018-03-10 RX ADMIN — INSULIN GLARGINE 6 UNIT(S): 100 INJECTION, SOLUTION SUBCUTANEOUS at 21:32

## 2018-03-10 RX ADMIN — Medication 81 MILLIGRAM(S): at 13:37

## 2018-03-10 RX ADMIN — SODIUM CHLORIDE 3 MILLILITER(S): 9 INJECTION INTRAMUSCULAR; INTRAVENOUS; SUBCUTANEOUS at 16:48

## 2018-03-10 RX ADMIN — Medication 100 MILLIGRAM(S): at 05:09

## 2018-03-10 RX ADMIN — Medication 20 MILLIEQUIVALENT(S): at 13:38

## 2018-03-10 RX ADMIN — LORATADINE 10 MILLIGRAM(S): 10 TABLET ORAL at 13:37

## 2018-03-10 RX ADMIN — Medication 20 MILLIGRAM(S): at 17:10

## 2018-03-10 RX ADMIN — Medication 1: at 18:19

## 2018-03-10 RX ADMIN — Medication 10 MILLIGRAM(S): at 18:18

## 2018-03-10 RX ADMIN — TIOTROPIUM BROMIDE 1 CAPSULE(S): 18 CAPSULE ORAL; RESPIRATORY (INHALATION) at 13:37

## 2018-03-10 RX ADMIN — MAGNESIUM OXIDE 400 MG ORAL TABLET 400 MILLIGRAM(S): 241.3 TABLET ORAL at 08:22

## 2018-03-10 RX ADMIN — SODIUM CHLORIDE 3 MILLILITER(S): 9 INJECTION INTRAMUSCULAR; INTRAVENOUS; SUBCUTANEOUS at 05:04

## 2018-03-10 RX ADMIN — ATORVASTATIN CALCIUM 20 MILLIGRAM(S): 80 TABLET, FILM COATED ORAL at 21:32

## 2018-03-10 RX ADMIN — Medication 40 MILLIGRAM(S): at 05:09

## 2018-03-10 RX ADMIN — Medication 600 MILLIGRAM(S): at 17:11

## 2018-03-10 RX ADMIN — Medication 3 MILLILITER(S): at 13:37

## 2018-03-10 RX ADMIN — Medication 10 MILLIGRAM(S): at 05:10

## 2018-03-10 RX ADMIN — MONTELUKAST 10 MILLIGRAM(S): 4 TABLET, CHEWABLE ORAL at 13:36

## 2018-03-10 RX ADMIN — SPIRONOLACTONE 25 MILLIGRAM(S): 25 TABLET, FILM COATED ORAL at 05:10

## 2018-03-10 RX ADMIN — MAGNESIUM OXIDE 400 MG ORAL TABLET 400 MILLIGRAM(S): 241.3 TABLET ORAL at 13:37

## 2018-03-10 RX ADMIN — FAMOTIDINE 20 MILLIGRAM(S): 10 INJECTION INTRAVENOUS at 05:09

## 2018-03-10 RX ADMIN — MAGNESIUM OXIDE 400 MG ORAL TABLET 400 MILLIGRAM(S): 241.3 TABLET ORAL at 18:19

## 2018-03-10 RX ADMIN — FAMOTIDINE 20 MILLIGRAM(S): 10 INJECTION INTRAVENOUS at 17:11

## 2018-03-10 RX ADMIN — Medication 3 MILLILITER(S): at 23:01

## 2018-03-10 RX ADMIN — APIXABAN 5 MILLIGRAM(S): 2.5 TABLET, FILM COATED ORAL at 05:09

## 2018-03-10 NOTE — PROGRESS NOTE ADULT - PROBLEM SELECTOR PLAN 2
·  Problem: CHF (congestive heart failure).  Recommendation: -Possible NSTEMI  -s/p LHC with normal coronaries, LVedp: 17  -c/w Lasix 40mg po daily per HF team  -s/p echo-mod lv dysfx

## 2018-03-10 NOTE — PROGRESS NOTE ADULT - SUBJECTIVE AND OBJECTIVE BOX
Follow-up Pulm Progress Note    No new respiratory events overnight.  Denies SOB/CP. "im coughing alot, it hurts my chest" + small amts of yellow mucous    Medications:  MEDICATIONS  (STANDING):  ALBUTerol/ipratropium for Nebulization 3 milliLiter(s) Nebulizer every 6 hours  apixaban 5 milliGRAM(s) Oral every 12 hours  aspirin enteric coated 81 milliGRAM(s) Oral daily  atorvastatin 20 milliGRAM(s) Oral at bedtime  buDESOnide 160 MICROgram(s)/formoterol 4.5 MICROgram(s) Inhaler 2 Puff(s) Inhalation two times a day  dextrose 5%. 1000 milliLiter(s) (50 mL/Hr) IV Continuous <Continuous>  dextrose 50% Injectable 12.5 Gram(s) IV Push once  dextrose 50% Injectable 25 Gram(s) IV Push once  dextrose 50% Injectable 25 Gram(s) IV Push once  docusate sodium 100 milliGRAM(s) Oral three times a day  enalapril 10 milliGRAM(s) Oral two times a day  famotidine    Tablet 20 milliGRAM(s) Oral two times a day  furosemide    Tablet 40 milliGRAM(s) Oral daily  guaiFENesin  milliGRAM(s) Oral every 12 hours  insulin glargine Injectable (LANTUS) 6 Unit(s) SubCutaneous at bedtime  insulin lispro (HumaLOG) corrective regimen sliding scale   SubCutaneous three times a day before meals  insulin lispro (HumaLOG) corrective regimen sliding scale   SubCutaneous at bedtime  loratadine 10 milliGRAM(s) Oral daily  magnesium oxide 400 milliGRAM(s) Oral three times a day with meals  montelukast 10 milliGRAM(s) Oral daily  polyethylene glycol 3350 17 Gram(s) Oral daily  potassium chloride   Solution 20 milliEquivalent(s) Oral daily  senna 2 Tablet(s) Oral at bedtime  sodium chloride 0.9% lock flush 3 milliLiter(s) IV Push every 8 hours  spironolactone 25 milliGRAM(s) Oral daily  tiotropium 18 MICROgram(s) Capsule 1 Capsule(s) Inhalation daily    MEDICATIONS  (PRN):  acetaminophen   Tablet. 650 milliGRAM(s) Oral every 6 hours PRN Moderate Pain (4 - 6)  dextrose Gel 1 Dose(s) Oral once PRN Blood Glucose LESS THAN 70 milliGRAM(s)/deciliter  glucagon  Injectable 1 milliGRAM(s) IntraMuscular once PRN Glucose LESS THAN 70 milligrams/deciliter  sodium chloride 0.65% Nasal 1 Spray(s) Both Nostrils every 2 hours PRN Nasal Congestion          Vital Signs Last 24 Hrs  T(C): 36.3 (10 Mar 2018 10:28), Max: 36.7 (10 Mar 2018 04:30)  T(F): 97.4 (10 Mar 2018 10:28), Max: 98.1 (10 Mar 2018 04:30)  HR: 87 (10 Mar 2018 10:28) (85 - 94)  BP: 110/67 (10 Mar 2018 10:28) (101/51 - 114/58)  BP(mean): --  RR: 16 (10 Mar 2018 10:28) (16 - 18)  SpO2: 95% (10 Mar 2018 10:28) (95% - 100%)          03-09 @ 07:01  -  03-10 @ 07:00  --------------------------------------------------------  IN: 980 mL / OUT: 2 mL / NET: 978 mL          LABS:                        12.2   10.2  )-----------( 185      ( 10 Mar 2018 04:43 )             36.5     03-10    135  |  96  |  55<H>  ----------------------------<  136<H>  5.1   |  26  |  1.30    Ca    9.3      10 Mar 2018 04:43  Phos  3.6     03-10  Mg     2.2     03-10    TPro  7.8  /  Alb  4.1  /  TBili  0.5  /  DBili  x   /  AST  16  /  ALT  14  /  AlkPhos  77  03-08          CAPILLARY BLOOD GLUCOSE      POCT Blood Glucose.: 121 mg/dL (10 Mar 2018 06:51)        Procalcitonin, Serum: <0.05 ng/mL (03-10-18 @ 04:43)    Serum Pro-Brain Natriuretic Peptide: 120 pg/mL (03-09-18 @ 00:44)                CULTURES: (if applicable)    Culture - Sputum . (03.10.18 @ 00:51)    Gram Stain:   Few Squamous epithelial cells per low power field  Few polymorphonuclear leukocytes per low power field  Moderate Gram variable coccobacilli per oil power field    Specimen Source: .Sputum Sputum          Physical Examination:  PULM: decreased bs bilaterally, no significant sputum production  CVS: S1, S2 heard    RADIOLOGY REVIEWED  CXR:     CT chest:< from: CT Chest No Cont (03.09.18 @ 21:13) >  Comparison: May 5, 2016 chest CT    Tubes/Lines: Left-sided pacing device seen with leads in the right atrium   and ventricle as well as in the coronary sinus.    Mediastinum/Vessels/Heart: Thyroid gland is unremarkable. Aorta and   pulmonary arteries are normal in size.     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 Scattered areas of nodularity in the bilateral   upper lobes and right lower lobe, with the largest nodule in the left   upper lobe measuring approximately 7 mm on series 3, image 32.    Bibasilar bronchial wall thickening and mucoid impaction.    Visualized abdomen: Post cholecystectomy. Otherwise, unremarkable   unenhanced appearance of the upper abdomen.    Bones and soft tissues: Degenerative changes noted throughout the spine.     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     Scattered areas of nodularity in the bilateral upper lobes and right   lower lobe, with the largest nodule in the left upper lobe measuring   approximately 7 mm on series 3, image 32.    Bibasilar bronchial wall thickening and mucoid impaction, which may be   from asthma or bronchitis.    < end of copied text >      TTE:< from: TTE with Doppler (w/Cont) (03.09.18 @ 15:47) >  Conclusions:  1. Endocardial visualization enhanced with intravenous  injection of echocontrast (Definity).  Despite the use of  echo contrast the visualization endocardium was still  suboptimal. At least, Moderate-severe segmental left  ventricular systolic dysfunction. Unable to calculate EF  accurately The  apical cap are akinetic.    < end of copied text > Follow-up Pulm Progress Note    No new respiratory events overnight.  Denies SOB/CP. "im coughing alot, it hurts my chest" + small amts of yellow mucous    Medications:  MEDICATIONS  (STANDING):  ALBUTerol/ipratropium for Nebulization 3 milliLiter(s) Nebulizer every 6 hours  apixaban 5 milliGRAM(s) Oral every 12 hours  aspirin enteric coated 81 milliGRAM(s) Oral daily  atorvastatin 20 milliGRAM(s) Oral at bedtime  buDESOnide 160 MICROgram(s)/formoterol 4.5 MICROgram(s) Inhaler 2 Puff(s) Inhalation two times a day  dextrose 5%. 1000 milliLiter(s) (50 mL/Hr) IV Continuous <Continuous>  dextrose 50% Injectable 12.5 Gram(s) IV Push once  dextrose 50% Injectable 25 Gram(s) IV Push once  dextrose 50% Injectable 25 Gram(s) IV Push once  docusate sodium 100 milliGRAM(s) Oral three times a day  enalapril 10 milliGRAM(s) Oral two times a day  famotidine    Tablet 20 milliGRAM(s) Oral two times a day  furosemide    Tablet 40 milliGRAM(s) Oral daily  guaiFENesin  milliGRAM(s) Oral every 12 hours  insulin glargine Injectable (LANTUS) 6 Unit(s) SubCutaneous at bedtime  insulin lispro (HumaLOG) corrective regimen sliding scale   SubCutaneous three times a day before meals  insulin lispro (HumaLOG) corrective regimen sliding scale   SubCutaneous at bedtime  loratadine 10 milliGRAM(s) Oral daily  magnesium oxide 400 milliGRAM(s) Oral three times a day with meals  montelukast 10 milliGRAM(s) Oral daily  polyethylene glycol 3350 17 Gram(s) Oral daily  potassium chloride   Solution 20 milliEquivalent(s) Oral daily  senna 2 Tablet(s) Oral at bedtime  sodium chloride 0.9% lock flush 3 milliLiter(s) IV Push every 8 hours  spironolactone 25 milliGRAM(s) Oral daily  tiotropium 18 MICROgram(s) Capsule 1 Capsule(s) Inhalation daily    MEDICATIONS  (PRN):  acetaminophen   Tablet. 650 milliGRAM(s) Oral every 6 hours PRN Moderate Pain (4 - 6)  dextrose Gel 1 Dose(s) Oral once PRN Blood Glucose LESS THAN 70 milliGRAM(s)/deciliter  glucagon  Injectable 1 milliGRAM(s) IntraMuscular once PRN Glucose LESS THAN 70 milligrams/deciliter  sodium chloride 0.65% Nasal 1 Spray(s) Both Nostrils every 2 hours PRN Nasal Congestion    Vital Signs Last 24 Hrs  T(C): 36.3 (10 Mar 2018 10:28), Max: 36.7 (10 Mar 2018 04:30)  T(F): 97.4 (10 Mar 2018 10:28), Max: 98.1 (10 Mar 2018 04:30)  HR: 87 (10 Mar 2018 10:28) (85 - 94)  BP: 110/67 (10 Mar 2018 10:28) (101/51 - 114/58)  BP(mean): --  RR: 16 (10 Mar 2018 10:28) (16 - 18)  SpO2: 95% (10 Mar 2018 10:28) (95% - 100%)    03-09 @ 07:01  -  03-10 @ 07:00  --------------------------------------------------------  IN: 980 mL / OUT: 2 mL / NET: 978 mL    LABS:                        12.2   10.2  )-----------( 185      ( 10 Mar 2018 04:43 )             36.5     03-10    135  |  96  |  55<H>  ----------------------------<  136<H>  5.1   |  26  |  1.30    Ca    9.3      10 Mar 2018 04:43  Phos  3.6     03-10  Mg     2.2     03-10    TPro  7.8  /  Alb  4.1  /  TBili  0.5  /  DBili  x   /  AST  16  /  ALT  14  /  AlkPhos  77  03-08    CAPILLARY BLOOD GLUCOSE      POCT Blood Glucose.: 121 mg/dL (10 Mar 2018 06:51)        Procalcitonin, Serum: <0.05 ng/mL (03-10-18 @ 04:43)    Serum Pro-Brain Natriuretic Peptide: 120 pg/mL (03-09-18 @ 00:44)    CULTURES: (if applicable)    Culture - Sputum . (03.10.18 @ 00:51)    Gram Stain:   Few Squamous epithelial cells per low power field  Few polymorphonuclear leukocytes per low power field  Moderate Gram variable coccobacilli per oil power field    Specimen Source: .Sputum Sputum    Physical Examination:  PULM: decreased bs bilaterally, no significant sputum production  CVS: S1, S2 heard    RADIOLOGY REVIEWED  CXR:     CT chest:< from: CT Chest No Cont (03.09.18 @ 21:13) >  Comparison: May 5, 2016 chest CT    Tubes/Lines: Left-sided pacing device seen with leads in the right atrium   and ventricle as well as in the coronary sinus.    Mediastinum/Vessels/Heart: Thyroid gland is unremarkable. Aorta and   pulmonary arteries are normal in size.     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 Scattered areas of nodularity in the bilateral   upper lobes and right lower lobe, with the largest nodule in the left   upper lobe measuring approximately 7 mm on series 3, image 32.    Bibasilar bronchial wall thickening and mucoid impaction.    Visualized abdomen: Post cholecystectomy. Otherwise, unremarkable   unenhanced appearance of the upper abdomen.    Bones and soft tissues: Degenerative changes noted throughout the spine.     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     Scattered areas of nodularity in the bilateral upper lobes and right   lower lobe, with the largest nodule in the left upper lobe measuring   approximately 7 mm on series 3, image 32.    Bibasilar bronchial wall thickening and mucoid impaction, which may be   from asthma or bronchitis.    < end of copied text >      TTE:< from: TTE with Doppler (w/Cont) (03.09.18 @ 15:47) >  Conclusions:  1. Endocardial visualization enhanced with intravenous  injection of echocontrast (Definity).  Despite the use of  echo contrast the visualization endocardium was still  suboptimal. At least, Moderate-severe segmental left  ventricular systolic dysfunction. Unable to calculate EF  accurately The  apical cap are akinetic.    < end of copied text >

## 2018-03-10 NOTE — PROGRESS NOTE ADULT - SUBJECTIVE AND OBJECTIVE BOX
Patient is a 72y old  Female who presents with a chief complaint of cardiac cath (09 Mar 2018 19:21)      SUBJECTIVE / OVERNIGHT EVENTS: no new events, denies SOB, CP    MEDICATIONS  (STANDING):  ALBUTerol/ipratropium for Nebulization 3 milliLiter(s) Nebulizer every 6 hours  apixaban 5 milliGRAM(s) Oral every 12 hours  aspirin enteric coated 81 milliGRAM(s) Oral daily  atorvastatin 20 milliGRAM(s) Oral at bedtime  buDESOnide 160 MICROgram(s)/formoterol 4.5 MICROgram(s) Inhaler 2 Puff(s) Inhalation two times a day  dextrose 5%. 1000 milliLiter(s) (50 mL/Hr) IV Continuous <Continuous>  dextrose 50% Injectable 12.5 Gram(s) IV Push once  dextrose 50% Injectable 25 Gram(s) IV Push once  dextrose 50% Injectable 25 Gram(s) IV Push once  docusate sodium 100 milliGRAM(s) Oral three times a day  enalapril 10 milliGRAM(s) Oral two times a day  famotidine    Tablet 20 milliGRAM(s) Oral two times a day  furosemide   Injectable 40 milliGRAM(s) IV Push daily  guaiFENesin  milliGRAM(s) Oral every 12 hours  insulin glargine Injectable (LANTUS) 6 Unit(s) SubCutaneous at bedtime  insulin lispro (HumaLOG) corrective regimen sliding scale   SubCutaneous three times a day before meals  insulin lispro (HumaLOG) corrective regimen sliding scale   SubCutaneous at bedtime  loratadine 10 milliGRAM(s) Oral daily  magnesium oxide 400 milliGRAM(s) Oral three times a day with meals  montelukast 10 milliGRAM(s) Oral daily  polyethylene glycol 3350 17 Gram(s) Oral daily  potassium chloride   Solution 20 milliEquivalent(s) Oral daily  senna 2 Tablet(s) Oral at bedtime  sodium chloride 0.9% lock flush 3 milliLiter(s) IV Push every 8 hours  spironolactone 25 milliGRAM(s) Oral daily  tiotropium 18 MICROgram(s) Capsule 1 Capsule(s) Inhalation daily    MEDICATIONS  (PRN):  acetaminophen   Tablet. 650 milliGRAM(s) Oral every 6 hours PRN Moderate Pain (4 - 6)  dextrose Gel 1 Dose(s) Oral once PRN Blood Glucose LESS THAN 70 milliGRAM(s)/deciliter  glucagon  Injectable 1 milliGRAM(s) IntraMuscular once PRN Glucose LESS THAN 70 milligrams/deciliter  sodium chloride 0.65% Nasal 1 Spray(s) Both Nostrils every 2 hours PRN Nasal Congestion      T(C): 36.3 (03-10-18 @ 10:28), Max: 36.7 (03-10-18 @ 04:30)  HR: 87 (03-10-18 @ 10:28) (85 - 94)  BP: 110/67 (03-10-18 @ 10:28) (101/51 - 114/58)  RR: 16 (03-10-18 @ 10:28) (16 - 18)  SpO2: 95% (03-10-18 @ 10:28) (95% - 100%)  CAPILLARY BLOOD GLUCOSE      POCT Blood Glucose.: 121 mg/dL (10 Mar 2018 06:51)  POCT Blood Glucose.: 173 mg/dL (09 Mar 2018 21:21)  POCT Blood Glucose.: 122 mg/dL (09 Mar 2018 17:55)    I&O's Summary    09 Mar 2018 07:01  -  10 Mar 2018 07:00  --------------------------------------------------------  IN: 980 mL / OUT: 2 mL / NET: 978 mL        PHYSICAL EXAM:  GENERAL: NAD, well-developed  HEAD:  Atraumatic, Normocephalic  EYES: EOMI, PERRLA, conjunctiva and sclera clear  NECK: Supple, No JVD  CHEST/LUNG: Clear to auscultation bilaterally; No wheeze  HEART: Regular rate and rhythm; No murmurs, rubs, or gallops  ABDOMEN: Soft, Nontender, Nondistended; Bowel sounds present  EXTREMITIES:  2+ Peripheral Pulses, No clubbing, cyanosis, or edema  PSYCH: AAOx3  NEUROLOGY: non-focal  SKIN: No rashes or lesions    LABS:                        12.2   10.2  )-----------( 185      ( 10 Mar 2018 04:43 )             36.5     03-10    135  |  96  |  55<H>  ----------------------------<  136<H>  5.1   |  26  |  1.30    Ca    9.3      10 Mar 2018 04:43  Phos  3.6     03-10  Mg     2.2     03-10    TPro  7.8  /  Alb  4.1  /  TBili  0.5  /  DBili  x   /  AST  16  /  ALT  14  /  AlkPhos  77  03-08              RADIOLOGY & ADDITIONAL TESTS:    Imaging Personally Reviewed:    Consultant(s) Notes Reviewed:      Care Discussed with Consultants/Other Providers:

## 2018-03-10 NOTE — PROGRESS NOTE ADULT - PROBLEM SELECTOR PLAN 2
-CHF recs appreciated.   -BNP normal, so unlikely CHF exacerbation.   -C/w lasix will switch to PO (lasix PO 40mg twice daily).  -C/w enalapril 10mg twice daily.  -Echo ordered.  -Due for ICD interrogation as outpatient on Monday. -C/w telemetry.  -Monitor I's/O's. -CHF recs appreciated.   -BNP normal, so unlikely CHF exacerbation.   -C/w lasix will switch to PO (lasix PO 40mg daily).  -C/w enalapril 10mg twice daily.  -Echo ordered.  -Due for ICD interrogation as outpatient on Monday. -C/w telemetry.  -Monitor I's/O's.

## 2018-03-11 LAB
ANION GAP SERPL CALC-SCNC: 14 MMOL/L — SIGNIFICANT CHANGE UP (ref 5–17)
BUN SERPL-MCNC: 53 MG/DL — HIGH (ref 7–23)
CALCIUM SERPL-MCNC: 9.7 MG/DL — SIGNIFICANT CHANGE UP (ref 8.4–10.5)
CHLORIDE SERPL-SCNC: 94 MMOL/L — LOW (ref 96–108)
CO2 SERPL-SCNC: 25 MMOL/L — SIGNIFICANT CHANGE UP (ref 22–31)
CREAT SERPL-MCNC: 1.25 MG/DL — SIGNIFICANT CHANGE UP (ref 0.5–1.3)
GLUCOSE BLDC GLUCOMTR-MCNC: 232 MG/DL — HIGH (ref 70–99)
GLUCOSE BLDC GLUCOMTR-MCNC: 261 MG/DL — HIGH (ref 70–99)
GLUCOSE BLDC GLUCOMTR-MCNC: 266 MG/DL — HIGH (ref 70–99)
GLUCOSE BLDC GLUCOMTR-MCNC: 278 MG/DL — HIGH (ref 70–99)
GLUCOSE SERPL-MCNC: 247 MG/DL — HIGH (ref 70–99)
HCT VFR BLD CALC: 36.2 % — SIGNIFICANT CHANGE UP (ref 34.5–45)
HGB BLD-MCNC: 11.9 G/DL — SIGNIFICANT CHANGE UP (ref 11.5–15.5)
MAGNESIUM SERPL-MCNC: 2.4 MG/DL — SIGNIFICANT CHANGE UP (ref 1.6–2.6)
MCHC RBC-ENTMCNC: 32.8 PG — SIGNIFICANT CHANGE UP (ref 27–34)
MCHC RBC-ENTMCNC: 32.9 GM/DL — SIGNIFICANT CHANGE UP (ref 32–36)
MCV RBC AUTO: 99.6 FL — SIGNIFICANT CHANGE UP (ref 80–100)
PLATELET # BLD AUTO: 182 K/UL — SIGNIFICANT CHANGE UP (ref 150–400)
POTASSIUM SERPL-MCNC: 5.3 MMOL/L — SIGNIFICANT CHANGE UP (ref 3.5–5.3)
POTASSIUM SERPL-SCNC: 5.3 MMOL/L — SIGNIFICANT CHANGE UP (ref 3.5–5.3)
RBC # BLD: 3.63 M/UL — LOW (ref 3.8–5.2)
RBC # FLD: 12.4 % — SIGNIFICANT CHANGE UP (ref 10.3–14.5)
SODIUM SERPL-SCNC: 133 MMOL/L — LOW (ref 135–145)
WBC # BLD: 10.3 K/UL — SIGNIFICANT CHANGE UP (ref 3.8–10.5)
WBC # FLD AUTO: 10.3 K/UL — SIGNIFICANT CHANGE UP (ref 3.8–10.5)

## 2018-03-11 PROCEDURE — 99233 SBSQ HOSP IP/OBS HIGH 50: CPT | Mod: GC

## 2018-03-11 RX ADMIN — Medication 40 MILLIGRAM(S): at 05:32

## 2018-03-11 RX ADMIN — MAGNESIUM OXIDE 400 MG ORAL TABLET 400 MILLIGRAM(S): 241.3 TABLET ORAL at 09:12

## 2018-03-11 RX ADMIN — MAGNESIUM OXIDE 400 MG ORAL TABLET 400 MILLIGRAM(S): 241.3 TABLET ORAL at 17:45

## 2018-03-11 RX ADMIN — Medication 3: at 09:11

## 2018-03-11 RX ADMIN — Medication 10 MILLIGRAM(S): at 05:32

## 2018-03-11 RX ADMIN — SODIUM CHLORIDE 3 MILLILITER(S): 9 INJECTION INTRAMUSCULAR; INTRAVENOUS; SUBCUTANEOUS at 05:32

## 2018-03-11 RX ADMIN — SODIUM CHLORIDE 3 MILLILITER(S): 9 INJECTION INTRAMUSCULAR; INTRAVENOUS; SUBCUTANEOUS at 21:16

## 2018-03-11 RX ADMIN — BUDESONIDE AND FORMOTEROL FUMARATE DIHYDRATE 2 PUFF(S): 160; 4.5 AEROSOL RESPIRATORY (INHALATION) at 05:32

## 2018-03-11 RX ADMIN — Medication 600 MILLIGRAM(S): at 17:44

## 2018-03-11 RX ADMIN — INSULIN GLARGINE 6 UNIT(S): 100 INJECTION, SOLUTION SUBCUTANEOUS at 22:01

## 2018-03-11 RX ADMIN — LORATADINE 10 MILLIGRAM(S): 10 TABLET ORAL at 12:05

## 2018-03-11 RX ADMIN — Medication 3: at 18:20

## 2018-03-11 RX ADMIN — BUDESONIDE AND FORMOTEROL FUMARATE DIHYDRATE 2 PUFF(S): 160; 4.5 AEROSOL RESPIRATORY (INHALATION) at 17:43

## 2018-03-11 RX ADMIN — Medication 81 MILLIGRAM(S): at 12:08

## 2018-03-11 RX ADMIN — Medication 10 MILLIGRAM(S): at 17:45

## 2018-03-11 RX ADMIN — FAMOTIDINE 20 MILLIGRAM(S): 10 INJECTION INTRAVENOUS at 05:33

## 2018-03-11 RX ADMIN — Medication 3 MILLILITER(S): at 17:43

## 2018-03-11 RX ADMIN — Medication 20 MILLIEQUIVALENT(S): at 12:06

## 2018-03-11 RX ADMIN — APIXABAN 5 MILLIGRAM(S): 2.5 TABLET, FILM COATED ORAL at 17:44

## 2018-03-11 RX ADMIN — Medication 20 MILLIGRAM(S): at 05:33

## 2018-03-11 RX ADMIN — TIOTROPIUM BROMIDE 1 CAPSULE(S): 18 CAPSULE ORAL; RESPIRATORY (INHALATION) at 12:06

## 2018-03-11 RX ADMIN — SODIUM CHLORIDE 3 MILLILITER(S): 9 INJECTION INTRAMUSCULAR; INTRAVENOUS; SUBCUTANEOUS at 13:25

## 2018-03-11 RX ADMIN — MONTELUKAST 10 MILLIGRAM(S): 4 TABLET, CHEWABLE ORAL at 12:05

## 2018-03-11 RX ADMIN — Medication 2: at 13:26

## 2018-03-11 RX ADMIN — AZITHROMYCIN 250 MILLIGRAM(S): 500 TABLET, FILM COATED ORAL at 13:28

## 2018-03-11 RX ADMIN — FAMOTIDINE 20 MILLIGRAM(S): 10 INJECTION INTRAVENOUS at 17:44

## 2018-03-11 RX ADMIN — Medication 3 MILLILITER(S): at 05:32

## 2018-03-11 RX ADMIN — Medication 1: at 21:08

## 2018-03-11 RX ADMIN — MAGNESIUM OXIDE 400 MG ORAL TABLET 400 MILLIGRAM(S): 241.3 TABLET ORAL at 13:27

## 2018-03-11 RX ADMIN — Medication 3 MILLILITER(S): at 12:05

## 2018-03-11 RX ADMIN — Medication 600 MILLIGRAM(S): at 05:33

## 2018-03-11 RX ADMIN — APIXABAN 5 MILLIGRAM(S): 2.5 TABLET, FILM COATED ORAL at 05:32

## 2018-03-11 RX ADMIN — ATORVASTATIN CALCIUM 20 MILLIGRAM(S): 80 TABLET, FILM COATED ORAL at 21:09

## 2018-03-11 RX ADMIN — SPIRONOLACTONE 25 MILLIGRAM(S): 25 TABLET, FILM COATED ORAL at 05:33

## 2018-03-11 NOTE — PHYSICAL THERAPY INITIAL EVALUATION ADULT - PERTINENT HX OF CURRENT PROBLEM, REHAB EVAL
71 y/o female presented to [facility] on 3/7 c/o epigastric pain radiated to both arms ,transferred here for cardiac cath. Pt denies chest pain or SOB, ambulatory. s/p cardiac cath - clear coronories.

## 2018-03-11 NOTE — PHYSICAL THERAPY INITIAL EVALUATION ADULT - PLANNED THERAPY INTERVENTIONS, PT EVAL
balance training/transfer training/gait training/strengthening/Stair Negotiation  GOAL: Pt will perform 5 stairs with   HR  within 2-3 weeks.

## 2018-03-11 NOTE — PROGRESS NOTE ADULT - PROBLEM SELECTOR PLAN 2
-CHF recs appreciated.   -BNP normal, so unlikely CHF exacerbation.   -C/w lasix will switch to PO (lasix PO 40mg daily).  d/c spironolactone given potassium of 5.3  -C/w enalapril 10mg twice daily.  -Echo ordered.  -Due for ICD interrogation as outpatient on Monday. -C/w telemetry.  -Monitor I's/O's.

## 2018-03-11 NOTE — PROGRESS NOTE ADULT - SUBJECTIVE AND OBJECTIVE BOX
Patient is a 72y old  Female who presents with a chief complaint of cardiac cath (09 Mar 2018 19:21)      SUBJECTIVE / OVERNIGHT EVENTS: No new complaints, continued productive cough, denies CP, SOB but has not ambulated    MEDICATIONS  (STANDING):  ALBUTerol/ipratropium for Nebulization 3 milliLiter(s) Nebulizer every 6 hours  apixaban 5 milliGRAM(s) Oral every 12 hours  aspirin enteric coated 81 milliGRAM(s) Oral daily  atorvastatin 20 milliGRAM(s) Oral at bedtime  azithromycin   Tablet 250 milliGRAM(s) Oral daily  buDESOnide 160 MICROgram(s)/formoterol 4.5 MICROgram(s) Inhaler 2 Puff(s) Inhalation two times a day  dextrose 5%. 1000 milliLiter(s) (50 mL/Hr) IV Continuous <Continuous>  dextrose 50% Injectable 12.5 Gram(s) IV Push once  dextrose 50% Injectable 25 Gram(s) IV Push once  dextrose 50% Injectable 25 Gram(s) IV Push once  docusate sodium 100 milliGRAM(s) Oral three times a day  enalapril 10 milliGRAM(s) Oral two times a day  famotidine    Tablet 20 milliGRAM(s) Oral two times a day  furosemide    Tablet 40 milliGRAM(s) Oral daily  guaiFENesin  milliGRAM(s) Oral every 12 hours  insulin glargine Injectable (LANTUS) 6 Unit(s) SubCutaneous at bedtime  insulin lispro (HumaLOG) corrective regimen sliding scale   SubCutaneous three times a day before meals  insulin lispro (HumaLOG) corrective regimen sliding scale   SubCutaneous at bedtime  loratadine 10 milliGRAM(s) Oral daily  magnesium oxide 400 milliGRAM(s) Oral three times a day with meals  montelukast 10 milliGRAM(s) Oral daily  polyethylene glycol 3350 17 Gram(s) Oral daily  potassium chloride   Solution 20 milliEquivalent(s) Oral daily  predniSONE   Tablet 20 milliGRAM(s) Oral daily  senna 2 Tablet(s) Oral at bedtime  sodium chloride 0.9% lock flush 3 milliLiter(s) IV Push every 8 hours  tiotropium 18 MICROgram(s) Capsule 1 Capsule(s) Inhalation daily    MEDICATIONS  (PRN):  acetaminophen   Tablet. 650 milliGRAM(s) Oral every 6 hours PRN Moderate Pain (4 - 6)  dextrose Gel 1 Dose(s) Oral once PRN Blood Glucose LESS THAN 70 milliGRAM(s)/deciliter  glucagon  Injectable 1 milliGRAM(s) IntraMuscular once PRN Glucose LESS THAN 70 milligrams/deciliter  sodium chloride 0.65% Nasal 1 Spray(s) Both Nostrils every 2 hours PRN Nasal Congestion      T(C): 36.4 (03-11-18 @ 04:22), Max: 36.7 (03-10-18 @ 21:02)  HR: 82 (03-11-18 @ 04:22) (82 - 95)  BP: 133/75 (03-11-18 @ 04:22) (107/66 - 133/75)  RR: 18 (03-11-18 @ 04:22) (16 - 18)  SpO2: 98% (03-11-18 @ 04:22) (95% - 98%)  CAPILLARY BLOOD GLUCOSE      POCT Blood Glucose.: 278 mg/dL (11 Mar 2018 08:37)  POCT Blood Glucose.: 225 mg/dL (10 Mar 2018 21:28)  POCT Blood Glucose.: 191 mg/dL (10 Mar 2018 17:59)  POCT Blood Glucose.: 150 mg/dL (10 Mar 2018 12:44)    I&O's Summary    10 Mar 2018 06:01  -  11 Mar 2018 07:00  --------------------------------------------------------  IN: 240 mL / OUT: 780 mL / NET: -540 mL        PHYSICAL EXAM:  GENERAL: NAD, well-developed  HEAD:  Atraumatic, Normocephalic  EYES: EOMI, PERRLA, conjunctiva and sclera clear  NECK: Supple, No JVD  CHEST/LUNG: Clear to auscultation bilaterally; No wheeze  HEART: Regular rate and rhythm; No murmurs, rubs, or gallops  ABDOMEN: Soft, Nontender, Nondistended; Bowel sounds present  EXTREMITIES:  2+ Peripheral Pulses, No clubbing, cyanosis, or edema  PSYCH: AAOx3  NEUROLOGY: non-focal  SKIN: No rashes or lesions    LABS:                        11.9   10.3  )-----------( 182      ( 11 Mar 2018 07:04 )             36.2     03-11    133<L>  |  94<L>  |  53<H>  ----------------------------<  247<H>  5.3   |  25  |  1.25    Ca    9.7      11 Mar 2018 07:04  Phos  3.6     03-10  Mg     2.4     03-11                RADIOLOGY & ADDITIONAL TESTS:    Imaging Personally Reviewed:    Consultant(s) Notes Reviewed:      Care Discussed with Consultants/Other Providers:

## 2018-03-11 NOTE — PROGRESS NOTE ADULT - SUBJECTIVE AND OBJECTIVE BOX
Follow-up Pulm Progress Note    No new respiratory events overnight.  Denies SOB/CP. o2 sat 95% on room air, still cough some yellow mucous    Medications:  MEDICATIONS  (STANDING):  ALBUTerol/ipratropium for Nebulization 3 milliLiter(s) Nebulizer every 6 hours  apixaban 5 milliGRAM(s) Oral every 12 hours  aspirin enteric coated 81 milliGRAM(s) Oral daily  atorvastatin 20 milliGRAM(s) Oral at bedtime  azithromycin   Tablet 250 milliGRAM(s) Oral daily  buDESOnide 160 MICROgram(s)/formoterol 4.5 MICROgram(s) Inhaler 2 Puff(s) Inhalation two times a day  dextrose 5%. 1000 milliLiter(s) (50 mL/Hr) IV Continuous <Continuous>  dextrose 50% Injectable 12.5 Gram(s) IV Push once  dextrose 50% Injectable 25 Gram(s) IV Push once  dextrose 50% Injectable 25 Gram(s) IV Push once  docusate sodium 100 milliGRAM(s) Oral three times a day  enalapril 10 milliGRAM(s) Oral two times a day  famotidine    Tablet 20 milliGRAM(s) Oral two times a day  furosemide    Tablet 40 milliGRAM(s) Oral daily  guaiFENesin  milliGRAM(s) Oral every 12 hours  insulin glargine Injectable (LANTUS) 6 Unit(s) SubCutaneous at bedtime  insulin lispro (HumaLOG) corrective regimen sliding scale   SubCutaneous three times a day before meals  insulin lispro (HumaLOG) corrective regimen sliding scale   SubCutaneous at bedtime  loratadine 10 milliGRAM(s) Oral daily  magnesium oxide 400 milliGRAM(s) Oral three times a day with meals  montelukast 10 milliGRAM(s) Oral daily  polyethylene glycol 3350 17 Gram(s) Oral daily  potassium chloride   Solution 20 milliEquivalent(s) Oral daily  predniSONE   Tablet 20 milliGRAM(s) Oral daily  senna 2 Tablet(s) Oral at bedtime  sodium chloride 0.9% lock flush 3 milliLiter(s) IV Push every 8 hours  spironolactone 25 milliGRAM(s) Oral daily  tiotropium 18 MICROgram(s) Capsule 1 Capsule(s) Inhalation daily    MEDICATIONS  (PRN):  acetaminophen   Tablet. 650 milliGRAM(s) Oral every 6 hours PRN Moderate Pain (4 - 6)  dextrose Gel 1 Dose(s) Oral once PRN Blood Glucose LESS THAN 70 milliGRAM(s)/deciliter  glucagon  Injectable 1 milliGRAM(s) IntraMuscular once PRN Glucose LESS THAN 70 milligrams/deciliter  sodium chloride 0.65% Nasal 1 Spray(s) Both Nostrils every 2 hours PRN Nasal Congestion          Vital Signs Last 24 Hrs  T(C): 36.4 (11 Mar 2018 04:22), Max: 36.7 (10 Mar 2018 21:02)  T(F): 97.6 (11 Mar 2018 04:22), Max: 98 (10 Mar 2018 21:02)  HR: 82 (11 Mar 2018 04:22) (82 - 95)  BP: 133/75 (11 Mar 2018 04:22) (107/66 - 133/75)  BP(mean): --  RR: 18 (11 Mar 2018 04:22) (16 - 18)  SpO2: 98% (11 Mar 2018 04:22) (95% - 98%)          03-10 @ 06:01  -  03-11 @ 07:00  --------------------------------------------------------  IN: 240 mL / OUT: 780 mL / NET: -540 mL          LABS:                        11.9   10.3  )-----------( 182      ( 11 Mar 2018 07:04 )             36.2     03-11    133<L>  |  94<L>  |  53<H>  ----------------------------<  247<H>  5.3   |  25  |  1.25    Ca    9.7      11 Mar 2018 07:04  Phos  3.6     03-10  Mg     2.4     03-11            CAPILLARY BLOOD GLUCOSE      POCT Blood Glucose.: 278 mg/dL (11 Mar 2018 08:37)        Procalcitonin, Serum: <0.05 ng/mL (03-10-18 @ 04:43)    Serum Pro-Brain Natriuretic Peptide: 120 pg/mL (03-09-18 @ 00:44)                CULTURES: (if applicable)  Culture - Sputum . (03.10.18 @ 00:51)    Gram Stain:   Few Squamous epithelial cells per low power field  Few polymorphonuclear leukocytes per low power field  Moderate Gram variable coccobacilli per oil power field    Specimen Source: .Sputum Sputum    Culture Results:   Moderate Pseudomonas aeruginosa  Normal Respiratory Liz present    Culture - Blood (01.23.18 @ 21:45)    Specimen Source: .Blood Blood    Culture Results:   No growth at 5 days.    Culture - Blood (01.23.18 @ 21:45)    Specimen Source: .Blood Blood    Culture Results:   No growth at 5 days.        Physical Examination:  PULM: decreased bs bilaterally, no significant sputum production  CVS: S1, S2 heard    RADIOLOGY REVIEWED  CXR: < from: CT Chest No Cont (03.09.18 @ 21:13) >  INTERPRETATION:  Reason for Exam:  Right chest pain, r/o PNA    CT of the chest was performed from the thoracic inlet to the level of the   adrenal glands without contrast injection.    Comparison: May 5, 2016 chest CT    Tubes/Lines: Left-sided pacing device seen with leads in the right atrium   and ventricle as well as in the coronary sinus.    Mediastinum/Vessels/Heart: Thyroid gland is unremarkable. Aorta and   pulmonary arteries are normal in size.     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 Scattered areas of nodularity in the bilateral   upper lobes and right lower lobe, with the largest nodule in the left   upper lobe measuring approximately 7 mm on series 3, image 32.    Bibasilar bronchial wall thickening and mucoid impaction.    Visualized abdomen: Post cholecystectomy. Otherwise, unremarkable   unenhanced appearance of the upper abdomen.    Bones and soft tissues: Degenerative changes noted throughout the spine.     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     Scattered areas of nodularity in the bilateral upper lobes and right   lower lobe, with the largest nodule in the left upper lobe measuring   approximately 7 mm on series 3, image 32.    Bibasilar bronchial wall thickening and mucoid impaction, which may be   from asthma or bronchitis.    < end of copied text >          TTE:< from: Transthoracic Echocardiogram (04.24.17 @ 11:22) >  Conclusions:  1. Mild mitral annular calcification, otherwise normal  mitral valve. Minimal mitral regurgitation.  2. Aortic valve not well visualized. No aortic valve  regurgitation seen.  3. Endocardium not well visualized; grossly mild left  ventricular systolic dysfunction. The basal inferior, basal  inferoseptal, and inferolateral walls appear hypokinetic.  Paradoxical septal motion consistent with paced rhythm.  Repeat imaing with intravenous echo contrast could be  performed if clinically indicated.  4. Mild diastolic dysfunction (Stage I).  5. The right ventricle is not well visualized; grossly  normal right ventricular systolic function. A device wire  is noted in the right heart.  *** Compared with echocardiogram of 5/1/2016, overall LV  systolic function appears improved. LV systolic function  was grossly moderate to severely reduced on the prior  study.  --------    < end of copied text >

## 2018-03-11 NOTE — PHYSICAL THERAPY INITIAL EVALUATION ADULT - ADDITIONAL COMMENTS
As per pt, lives with daughter in 2 family house 6 steps to get in. Ambulates without AD indoor, Out door ambulation using RW or S.C As per pt, lives with daughter in 2 family house 6 steps to get in. Once inside bathroom and other amenities in same level. Ambulates without AD indoor, Out door ambulation using RW or S.C

## 2018-03-11 NOTE — PHYSICAL THERAPY INITIAL EVALUATION ADULT - BALANCE TRAINING, PT EVAL
GOAL: Pt will improve her balance during (static/dynamic) (sitting/standing) activities by at least 1 balance grade within 2-3 weeks to assist with greater independence during functional mobility and ADL's.

## 2018-03-11 NOTE — PHYSICAL THERAPY INITIAL EVALUATION ADULT - STRENGTHENING, PT EVAL
GOAL: Pt will improve her L OR R  (UE/LE) strength by at least 1/2 MMT grade within 2-3 weeks to assist with performing functional mobility and ADLs.

## 2018-03-12 ENCOUNTER — APPOINTMENT (OUTPATIENT)
Dept: ELECTROPHYSIOLOGY | Facility: CLINIC | Age: 73
End: 2018-03-12

## 2018-03-12 DIAGNOSIS — J45.901 UNSPECIFIED ASTHMA WITH (ACUTE) EXACERBATION: ICD-10-CM

## 2018-03-12 LAB
-  AMIKACIN: SIGNIFICANT CHANGE UP
-  AMIKACIN: SIGNIFICANT CHANGE UP
-  AZTREONAM: SIGNIFICANT CHANGE UP
-  AZTREONAM: SIGNIFICANT CHANGE UP
-  CEFEPIME: SIGNIFICANT CHANGE UP
-  CEFEPIME: SIGNIFICANT CHANGE UP
-  CEFTAZIDIME: SIGNIFICANT CHANGE UP
-  CEFTAZIDIME: SIGNIFICANT CHANGE UP
-  CIPROFLOXACIN: SIGNIFICANT CHANGE UP
-  CIPROFLOXACIN: SIGNIFICANT CHANGE UP
-  GENTAMICIN: SIGNIFICANT CHANGE UP
-  GENTAMICIN: SIGNIFICANT CHANGE UP
-  IMIPENEM: SIGNIFICANT CHANGE UP
-  IMIPENEM: SIGNIFICANT CHANGE UP
-  LEVOFLOXACIN: SIGNIFICANT CHANGE UP
-  LEVOFLOXACIN: SIGNIFICANT CHANGE UP
-  MEROPENEM: SIGNIFICANT CHANGE UP
-  MEROPENEM: SIGNIFICANT CHANGE UP
-  PIPERACILLIN/TAZOBACTAM: SIGNIFICANT CHANGE UP
-  PIPERACILLIN/TAZOBACTAM: SIGNIFICANT CHANGE UP
-  TOBRAMYCIN: SIGNIFICANT CHANGE UP
-  TOBRAMYCIN: SIGNIFICANT CHANGE UP
ANION GAP SERPL CALC-SCNC: 11 MMOL/L — SIGNIFICANT CHANGE UP (ref 5–17)
BUN SERPL-MCNC: 47 MG/DL — HIGH (ref 7–23)
CALCIUM SERPL-MCNC: 9.7 MG/DL — SIGNIFICANT CHANGE UP (ref 8.4–10.5)
CHLORIDE SERPL-SCNC: 97 MMOL/L — SIGNIFICANT CHANGE UP (ref 96–108)
CO2 SERPL-SCNC: 27 MMOL/L — SIGNIFICANT CHANGE UP (ref 22–31)
CREAT SERPL-MCNC: 1.14 MG/DL — SIGNIFICANT CHANGE UP (ref 0.5–1.3)
CULTURE RESULTS: SIGNIFICANT CHANGE UP
GLUCOSE BLDC GLUCOMTR-MCNC: 169 MG/DL — HIGH (ref 70–99)
GLUCOSE BLDC GLUCOMTR-MCNC: 173 MG/DL — HIGH (ref 70–99)
GLUCOSE BLDC GLUCOMTR-MCNC: 192 MG/DL — HIGH (ref 70–99)
GLUCOSE BLDC GLUCOMTR-MCNC: 279 MG/DL — HIGH (ref 70–99)
GLUCOSE SERPL-MCNC: 151 MG/DL — HIGH (ref 70–99)
METHOD TYPE: SIGNIFICANT CHANGE UP
METHOD TYPE: SIGNIFICANT CHANGE UP
ORGANISM # SPEC MICROSCOPIC CNT: SIGNIFICANT CHANGE UP
POTASSIUM SERPL-MCNC: 5.2 MMOL/L — SIGNIFICANT CHANGE UP (ref 3.5–5.3)
POTASSIUM SERPL-SCNC: 5.2 MMOL/L — SIGNIFICANT CHANGE UP (ref 3.5–5.3)
SODIUM SERPL-SCNC: 135 MMOL/L — SIGNIFICANT CHANGE UP (ref 135–145)
SPECIMEN SOURCE: SIGNIFICANT CHANGE UP

## 2018-03-12 PROCEDURE — 99233 SBSQ HOSP IP/OBS HIGH 50: CPT

## 2018-03-12 RX ORDER — FLUTICASONE PROPIONATE 50 MCG
1 SPRAY, SUSPENSION NASAL
Qty: 0 | Refills: 0 | Status: DISCONTINUED | OUTPATIENT
Start: 2018-03-12 | End: 2018-03-13

## 2018-03-12 RX ORDER — FUROSEMIDE 40 MG
40 TABLET ORAL
Qty: 0 | Refills: 0 | Status: DISCONTINUED | OUTPATIENT
Start: 2018-03-12 | End: 2018-03-13

## 2018-03-12 RX ORDER — FUROSEMIDE 40 MG
20 TABLET ORAL ONCE
Qty: 0 | Refills: 0 | Status: COMPLETED | OUTPATIENT
Start: 2018-03-12 | End: 2018-03-12

## 2018-03-12 RX ADMIN — MONTELUKAST 10 MILLIGRAM(S): 4 TABLET, CHEWABLE ORAL at 12:44

## 2018-03-12 RX ADMIN — Medication 600 MILLIGRAM(S): at 05:35

## 2018-03-12 RX ADMIN — MAGNESIUM OXIDE 400 MG ORAL TABLET 400 MILLIGRAM(S): 241.3 TABLET ORAL at 09:41

## 2018-03-12 RX ADMIN — Medication 10 MILLIGRAM(S): at 17:36

## 2018-03-12 RX ADMIN — Medication 10 MILLIGRAM(S): at 05:35

## 2018-03-12 RX ADMIN — Medication 81 MILLIGRAM(S): at 12:44

## 2018-03-12 RX ADMIN — Medication 3 MILLILITER(S): at 12:43

## 2018-03-12 RX ADMIN — APIXABAN 5 MILLIGRAM(S): 2.5 TABLET, FILM COATED ORAL at 17:32

## 2018-03-12 RX ADMIN — BUDESONIDE AND FORMOTEROL FUMARATE DIHYDRATE 2 PUFF(S): 160; 4.5 AEROSOL RESPIRATORY (INHALATION) at 05:36

## 2018-03-12 RX ADMIN — Medication 40 MILLIGRAM(S): at 05:35

## 2018-03-12 RX ADMIN — Medication 1: at 18:34

## 2018-03-12 RX ADMIN — Medication 20 MILLIGRAM(S): at 05:38

## 2018-03-12 RX ADMIN — Medication 20 MILLIGRAM(S): at 12:39

## 2018-03-12 RX ADMIN — Medication 200 MILLIGRAM(S): at 12:48

## 2018-03-12 RX ADMIN — BUDESONIDE AND FORMOTEROL FUMARATE DIHYDRATE 2 PUFF(S): 160; 4.5 AEROSOL RESPIRATORY (INHALATION) at 17:32

## 2018-03-12 RX ADMIN — MAGNESIUM OXIDE 400 MG ORAL TABLET 400 MILLIGRAM(S): 241.3 TABLET ORAL at 12:47

## 2018-03-12 RX ADMIN — Medication 1: at 09:38

## 2018-03-12 RX ADMIN — TIOTROPIUM BROMIDE 1 CAPSULE(S): 18 CAPSULE ORAL; RESPIRATORY (INHALATION) at 12:47

## 2018-03-12 RX ADMIN — POLYETHYLENE GLYCOL 3350 17 GRAM(S): 17 POWDER, FOR SOLUTION ORAL at 12:49

## 2018-03-12 RX ADMIN — Medication 20 MILLIEQUIVALENT(S): at 12:51

## 2018-03-12 RX ADMIN — ATORVASTATIN CALCIUM 20 MILLIGRAM(S): 80 TABLET, FILM COATED ORAL at 21:08

## 2018-03-12 RX ADMIN — Medication 3 MILLILITER(S): at 05:35

## 2018-03-12 RX ADMIN — AZITHROMYCIN 250 MILLIGRAM(S): 500 TABLET, FILM COATED ORAL at 12:45

## 2018-03-12 RX ADMIN — SODIUM CHLORIDE 3 MILLILITER(S): 9 INJECTION INTRAMUSCULAR; INTRAVENOUS; SUBCUTANEOUS at 15:42

## 2018-03-12 RX ADMIN — FAMOTIDINE 20 MILLIGRAM(S): 10 INJECTION INTRAVENOUS at 05:35

## 2018-03-12 RX ADMIN — Medication 650 MILLIGRAM(S): at 00:03

## 2018-03-12 RX ADMIN — LORATADINE 10 MILLIGRAM(S): 10 TABLET ORAL at 12:45

## 2018-03-12 RX ADMIN — Medication 100 MILLIGRAM(S): at 05:35

## 2018-03-12 RX ADMIN — Medication 1 SPRAY(S): at 17:35

## 2018-03-12 RX ADMIN — MAGNESIUM OXIDE 400 MG ORAL TABLET 400 MILLIGRAM(S): 241.3 TABLET ORAL at 17:34

## 2018-03-12 RX ADMIN — SODIUM CHLORIDE 3 MILLILITER(S): 9 INJECTION INTRAMUSCULAR; INTRAVENOUS; SUBCUTANEOUS at 21:07

## 2018-03-12 RX ADMIN — INSULIN GLARGINE 6 UNIT(S): 100 INJECTION, SOLUTION SUBCUTANEOUS at 22:16

## 2018-03-12 RX ADMIN — Medication 3 MILLILITER(S): at 00:05

## 2018-03-12 RX ADMIN — Medication 40 MILLIGRAM(S): at 17:37

## 2018-03-12 RX ADMIN — Medication 600 MILLIGRAM(S): at 17:33

## 2018-03-12 RX ADMIN — APIXABAN 5 MILLIGRAM(S): 2.5 TABLET, FILM COATED ORAL at 05:36

## 2018-03-12 RX ADMIN — Medication 3 MILLILITER(S): at 17:31

## 2018-03-12 RX ADMIN — FAMOTIDINE 20 MILLIGRAM(S): 10 INJECTION INTRAVENOUS at 17:34

## 2018-03-12 NOTE — PROGRESS NOTE ADULT - SUBJECTIVE AND OBJECTIVE BOX
Follow-up Pulm Progress Note    Still with nonproductive cough.     Medications:  MEDICATIONS  (STANDING):  ALBUTerol/ipratropium for Nebulization 3 milliLiter(s) Nebulizer every 6 hours  apixaban 5 milliGRAM(s) Oral every 12 hours  aspirin enteric coated 81 milliGRAM(s) Oral daily  atorvastatin 20 milliGRAM(s) Oral at bedtime  azithromycin   Tablet 250 milliGRAM(s) Oral daily  buDESOnide 160 MICROgram(s)/formoterol 4.5 MICROgram(s) Inhaler 2 Puff(s) Inhalation two times a day  dextrose 5%. 1000 milliLiter(s) (50 mL/Hr) IV Continuous <Continuous>  dextrose 50% Injectable 12.5 Gram(s) IV Push once  dextrose 50% Injectable 25 Gram(s) IV Push once  dextrose 50% Injectable 25 Gram(s) IV Push once  docusate sodium 100 milliGRAM(s) Oral three times a day  enalapril 10 milliGRAM(s) Oral two times a day  famotidine    Tablet 20 milliGRAM(s) Oral two times a day  furosemide    Tablet 40 milliGRAM(s) Oral two times a day  furosemide   Injectable 20 milliGRAM(s) IV Push once  guaiFENesin  milliGRAM(s) Oral every 12 hours  insulin glargine Injectable (LANTUS) 6 Unit(s) SubCutaneous at bedtime  insulin lispro (HumaLOG) corrective regimen sliding scale   SubCutaneous three times a day before meals  insulin lispro (HumaLOG) corrective regimen sliding scale   SubCutaneous at bedtime  loratadine 10 milliGRAM(s) Oral daily  magnesium oxide 400 milliGRAM(s) Oral three times a day with meals  montelukast 10 milliGRAM(s) Oral daily  polyethylene glycol 3350 17 Gram(s) Oral daily  potassium chloride   Solution 20 milliEquivalent(s) Oral daily  predniSONE   Tablet 20 milliGRAM(s) Oral daily  senna 2 Tablet(s) Oral at bedtime  sodium chloride 0.9% lock flush 3 milliLiter(s) IV Push every 8 hours  tiotropium 18 MICROgram(s) Capsule 1 Capsule(s) Inhalation daily    MEDICATIONS  (PRN):  acetaminophen   Tablet. 650 milliGRAM(s) Oral every 6 hours PRN Moderate Pain (4 - 6)  dextrose Gel 1 Dose(s) Oral once PRN Blood Glucose LESS THAN 70 milliGRAM(s)/deciliter  glucagon  Injectable 1 milliGRAM(s) IntraMuscular once PRN Glucose LESS THAN 70 milligrams/deciliter  sodium chloride 0.65% Nasal 1 Spray(s) Both Nostrils every 2 hours PRN Nasal Congestion    Vital Signs Last 24 Hrs  T(C): 36.8 (12 Mar 2018 04:09), Max: 36.9 (11 Mar 2018 20:47)  T(F): 98.2 (12 Mar 2018 04:09), Max: 98.4 (11 Mar 2018 20:47)  HR: 85 (12 Mar 2018 04:09) (85 - 97)  BP: 120/75 (12 Mar 2018 04:09) (101/62 - 145/76)  BP(mean): --  RR: 18 (12 Mar 2018 04:09) (18 - 18)  SpO2: 98% (12 Mar 2018 04:09) (97% - 98%)    03-11 @ 07:01  -  03-12 @ 07:00  --------------------------------------------------------  IN: 780 mL / OUT: 850 mL / NET: -70 mL    LABS:                        11.9   10.3  )-----------( 182      ( 11 Mar 2018 07:04 )             36.2     03-12    135  |  97  |  47<H>  ----------------------------<  151<H>  5.2   |  27  |  1.14    Ca    9.7      12 Mar 2018 06:11  Mg     2.4     03-11    CAPILLARY BLOOD GLUCOSE      POCT Blood Glucose.: 173 mg/dL (12 Mar 2018 08:47)    Procalcitonin, Serum: <0.05 ng/mL (03-10-18 @ 04:43)    CULTURES: (if applicable)    Physical Examination:  PULM: Mildly coarse, no wheezing  CVS: S1, S2 heard    RADIOLOGY REVIEWED  CXR:     CT chest:    TTE:

## 2018-03-12 NOTE — PROGRESS NOTE ADULT - SUBJECTIVE AND OBJECTIVE BOX
Patient is a 72y old  Female who presents with a chief complaint of cardiac cath (09 Mar 2018 19:21)        SUBJECTIVE / OVERNIGHT EVENTS: no acute complaints. concerned about having symptoms at home at night. denies cp and sob at this time.       MEDICATIONS  (STANDING):  ALBUTerol/ipratropium for Nebulization 3 milliLiter(s) Nebulizer every 6 hours  apixaban 5 milliGRAM(s) Oral every 12 hours  aspirin enteric coated 81 milliGRAM(s) Oral daily  atorvastatin 20 milliGRAM(s) Oral at bedtime  azithromycin   Tablet 250 milliGRAM(s) Oral daily  buDESOnide 160 MICROgram(s)/formoterol 4.5 MICROgram(s) Inhaler 2 Puff(s) Inhalation two times a day  dextrose 5%. 1000 milliLiter(s) (50 mL/Hr) IV Continuous <Continuous>  dextrose 50% Injectable 12.5 Gram(s) IV Push once  dextrose 50% Injectable 25 Gram(s) IV Push once  dextrose 50% Injectable 25 Gram(s) IV Push once  docusate sodium 100 milliGRAM(s) Oral three times a day  enalapril 10 milliGRAM(s) Oral two times a day  famotidine    Tablet 20 milliGRAM(s) Oral two times a day  furosemide    Tablet 40 milliGRAM(s) Oral daily  guaiFENesin  milliGRAM(s) Oral every 12 hours  insulin glargine Injectable (LANTUS) 6 Unit(s) SubCutaneous at bedtime  insulin lispro (HumaLOG) corrective regimen sliding scale   SubCutaneous three times a day before meals  insulin lispro (HumaLOG) corrective regimen sliding scale   SubCutaneous at bedtime  loratadine 10 milliGRAM(s) Oral daily  magnesium oxide 400 milliGRAM(s) Oral three times a day with meals  montelukast 10 milliGRAM(s) Oral daily  polyethylene glycol 3350 17 Gram(s) Oral daily  potassium chloride   Solution 20 milliEquivalent(s) Oral daily  predniSONE   Tablet 20 milliGRAM(s) Oral daily  senna 2 Tablet(s) Oral at bedtime  sodium chloride 0.9% lock flush 3 milliLiter(s) IV Push every 8 hours  tiotropium 18 MICROgram(s) Capsule 1 Capsule(s) Inhalation daily    MEDICATIONS  (PRN):  acetaminophen   Tablet. 650 milliGRAM(s) Oral every 6 hours PRN Moderate Pain (4 - 6)  dextrose Gel 1 Dose(s) Oral once PRN Blood Glucose LESS THAN 70 milliGRAM(s)/deciliter  glucagon  Injectable 1 milliGRAM(s) IntraMuscular once PRN Glucose LESS THAN 70 milligrams/deciliter  sodium chloride 0.65% Nasal 1 Spray(s) Both Nostrils every 2 hours PRN Nasal Congestion      Vital Signs Last 24 Hrs  T(C): 36.8 (12 Mar 2018 04:09), Max: 36.9 (11 Mar 2018 20:47)  T(F): 98.2 (12 Mar 2018 04:09), Max: 98.4 (11 Mar 2018 20:47)  HR: 85 (12 Mar 2018 04:09) (85 - 97)  BP: 120/75 (12 Mar 2018 04:09) (101/62 - 145/76)  BP(mean): --  RR: 18 (12 Mar 2018 04:09) (18 - 18)  SpO2: 98% (12 Mar 2018 04:09) (97% - 98%)  CAPILLARY BLOOD GLUCOSE      POCT Blood Glucose.: 173 mg/dL (12 Mar 2018 08:47)  POCT Blood Glucose.: 266 mg/dL (11 Mar 2018 21:05)  POCT Blood Glucose.: 261 mg/dL (11 Mar 2018 17:59)  POCT Blood Glucose.: 232 mg/dL (11 Mar 2018 13:08)    I&O's Summary    11 Mar 2018 07:01  -  12 Mar 2018 07:00  --------------------------------------------------------  IN: 780 mL / OUT: 850 mL / NET: -70 mL    12 Mar 2018 07:01  -  12 Mar 2018 10:38  --------------------------------------------------------  IN: 0 mL / OUT: 200 mL / NET: -200 mL      PHYSICAL EXAM:  GENERAL: NAD  HEAD:  Atraumatic, Normocephalic  EYES: conjunctiva and sclera clear  NECK: No JVD  CHEST/LUNG: CTA b/l, no wheezing  HEART: S1 S2 RRR  ABDOMEN: +BS Soft, NT/ND  EXTREMITIES:  2+ DP Pulses, No c/c. 1-2+ b/l LE edema up just above knee.   NEUROLOGY: AAOx3, no focal deficits   SKIN: No rashes or lesions  PHYSICAL EXAM:    LABS:                        11.9   10.3  )-----------( 182      ( 11 Mar 2018 07:04 )             36.2     03-12    135  |  97  |  47<H>  ----------------------------<  151<H>  5.2   |  27  |  1.14    Ca    9.7      12 Mar 2018 06:11  Mg     2.4     03-11                RADIOLOGY & ADDITIONAL TESTS:    Imaging Personally Reviewed: TTE reviewed - moderate to severe LV dysfunction ?as seen on previous TTE.   Consultant(s) Notes Reviewed:    Care Discussed with Consultants/Other Providers: d/w HF NP- Bethanie regarding plan of care

## 2018-03-12 NOTE — PROGRESS NOTE ADULT - PROBLEM SELECTOR PLAN 2
-Had a recent admission with coronavirus positive with asthma worsening.   -Z-pack and prednisone as per pulm. sputum cx with pseudomonas - pulm f/u  -C/w symbicort.  -Low suspicion for PE.

## 2018-03-13 ENCOUNTER — APPOINTMENT (OUTPATIENT)
Dept: ELECTROPHYSIOLOGY | Facility: CLINIC | Age: 73
End: 2018-03-13
Payer: MEDICARE

## 2018-03-13 VITALS
RESPIRATION RATE: 17 BRPM | HEART RATE: 80 BPM | OXYGEN SATURATION: 97 % | SYSTOLIC BLOOD PRESSURE: 108 MMHG | DIASTOLIC BLOOD PRESSURE: 72 MMHG | TEMPERATURE: 98 F

## 2018-03-13 LAB
ANION GAP SERPL CALC-SCNC: 12 MMOL/L — SIGNIFICANT CHANGE UP (ref 5–17)
BUN SERPL-MCNC: 48 MG/DL — HIGH (ref 7–23)
CALCIUM SERPL-MCNC: 9.4 MG/DL — SIGNIFICANT CHANGE UP (ref 8.4–10.5)
CHLORIDE SERPL-SCNC: 97 MMOL/L — SIGNIFICANT CHANGE UP (ref 96–108)
CO2 SERPL-SCNC: 27 MMOL/L — SIGNIFICANT CHANGE UP (ref 22–31)
CREAT SERPL-MCNC: 1.26 MG/DL — SIGNIFICANT CHANGE UP (ref 0.5–1.3)
GLUCOSE BLDC GLUCOMTR-MCNC: 173 MG/DL — HIGH (ref 70–99)
GLUCOSE BLDC GLUCOMTR-MCNC: 303 MG/DL — HIGH (ref 70–99)
GLUCOSE SERPL-MCNC: 182 MG/DL — HIGH (ref 70–99)
MAGNESIUM SERPL-MCNC: 2.2 MG/DL — SIGNIFICANT CHANGE UP (ref 1.6–2.6)
POTASSIUM SERPL-MCNC: 4.6 MMOL/L — SIGNIFICANT CHANGE UP (ref 3.5–5.3)
POTASSIUM SERPL-SCNC: 4.6 MMOL/L — SIGNIFICANT CHANGE UP (ref 3.5–5.3)
SODIUM SERPL-SCNC: 136 MMOL/L — SIGNIFICANT CHANGE UP (ref 135–145)

## 2018-03-13 PROCEDURE — 87070 CULTURE OTHR SPECIMN AEROBIC: CPT

## 2018-03-13 PROCEDURE — C1887: CPT

## 2018-03-13 PROCEDURE — 87581 M.PNEUMON DNA AMP PROBE: CPT

## 2018-03-13 PROCEDURE — 84100 ASSAY OF PHOSPHORUS: CPT

## 2018-03-13 PROCEDURE — 80048 BASIC METABOLIC PNL TOTAL CA: CPT

## 2018-03-13 PROCEDURE — 83036 HEMOGLOBIN GLYCOSYLATED A1C: CPT

## 2018-03-13 PROCEDURE — 93295 DEV INTERROG REMOTE 1/2/MLT: CPT

## 2018-03-13 PROCEDURE — 94640 AIRWAY INHALATION TREATMENT: CPT

## 2018-03-13 PROCEDURE — 93005 ELECTROCARDIOGRAM TRACING: CPT

## 2018-03-13 PROCEDURE — 87186 SC STD MICRODIL/AGAR DIL: CPT

## 2018-03-13 PROCEDURE — 99239 HOSP IP/OBS DSCHRG MGMT >30: CPT

## 2018-03-13 PROCEDURE — 71250 CT THORAX DX C-: CPT

## 2018-03-13 PROCEDURE — 99222 1ST HOSP IP/OBS MODERATE 55: CPT | Mod: GC

## 2018-03-13 PROCEDURE — 84145 PROCALCITONIN (PCT): CPT

## 2018-03-13 PROCEDURE — 85027 COMPLETE CBC AUTOMATED: CPT

## 2018-03-13 PROCEDURE — 80053 COMPREHEN METABOLIC PANEL: CPT

## 2018-03-13 PROCEDURE — C8929: CPT

## 2018-03-13 PROCEDURE — 99152 MOD SED SAME PHYS/QHP 5/>YRS: CPT

## 2018-03-13 PROCEDURE — C1894: CPT

## 2018-03-13 PROCEDURE — C1769: CPT

## 2018-03-13 PROCEDURE — 87798 DETECT AGENT NOS DNA AMP: CPT

## 2018-03-13 PROCEDURE — 87486 CHLMYD PNEUM DNA AMP PROBE: CPT

## 2018-03-13 PROCEDURE — 82962 GLUCOSE BLOOD TEST: CPT

## 2018-03-13 PROCEDURE — 93458 L HRT ARTERY/VENTRICLE ANGIO: CPT

## 2018-03-13 PROCEDURE — 83880 ASSAY OF NATRIURETIC PEPTIDE: CPT

## 2018-03-13 PROCEDURE — 83735 ASSAY OF MAGNESIUM: CPT

## 2018-03-13 PROCEDURE — 97161 PT EVAL LOW COMPLEX 20 MIN: CPT

## 2018-03-13 PROCEDURE — 87633 RESP VIRUS 12-25 TARGETS: CPT

## 2018-03-13 RX ORDER — AZITHROMYCIN 500 MG/1
1 TABLET, FILM COATED ORAL
Qty: 1 | Refills: 0 | OUTPATIENT
Start: 2018-03-13 | End: 2018-03-13

## 2018-03-13 RX ORDER — SPIRONOLACTONE 25 MG/1
1 TABLET, FILM COATED ORAL
Qty: 0 | Refills: 0 | DISCHARGE
Start: 2018-03-13

## 2018-03-13 RX ORDER — SPIRONOLACTONE 25 MG/1
0.5 TABLET, FILM COATED ORAL
Qty: 15 | Refills: 0
Start: 2018-03-13

## 2018-03-13 RX ORDER — MONTELUKAST 4 MG/1
1 TABLET, CHEWABLE ORAL
Qty: 30 | Refills: 0
Start: 2018-03-13

## 2018-03-13 RX ORDER — FUROSEMIDE 40 MG
1 TABLET ORAL
Qty: 60 | Refills: 0
Start: 2018-03-13

## 2018-03-13 RX ORDER — FUROSEMIDE 40 MG
1 TABLET ORAL
Qty: 0 | Refills: 0 | COMMUNITY

## 2018-03-13 RX ORDER — SPIRONOLACTONE 25 MG/1
12.5 TABLET, FILM COATED ORAL DAILY
Qty: 0 | Refills: 0 | Status: DISCONTINUED | OUTPATIENT
Start: 2018-03-13 | End: 2018-03-13

## 2018-03-13 RX ADMIN — BUDESONIDE AND FORMOTEROL FUMARATE DIHYDRATE 2 PUFF(S): 160; 4.5 AEROSOL RESPIRATORY (INHALATION) at 06:35

## 2018-03-13 RX ADMIN — SODIUM CHLORIDE 3 MILLILITER(S): 9 INJECTION INTRAMUSCULAR; INTRAVENOUS; SUBCUTANEOUS at 16:13

## 2018-03-13 RX ADMIN — MAGNESIUM OXIDE 400 MG ORAL TABLET 400 MILLIGRAM(S): 241.3 TABLET ORAL at 14:04

## 2018-03-13 RX ADMIN — TIOTROPIUM BROMIDE 1 CAPSULE(S): 18 CAPSULE ORAL; RESPIRATORY (INHALATION) at 14:03

## 2018-03-13 RX ADMIN — Medication 81 MILLIGRAM(S): at 10:02

## 2018-03-13 RX ADMIN — Medication 40 MILLIGRAM(S): at 06:50

## 2018-03-13 RX ADMIN — MONTELUKAST 10 MILLIGRAM(S): 4 TABLET, CHEWABLE ORAL at 14:04

## 2018-03-13 RX ADMIN — SODIUM CHLORIDE 3 MILLILITER(S): 9 INJECTION INTRAMUSCULAR; INTRAVENOUS; SUBCUTANEOUS at 06:34

## 2018-03-13 RX ADMIN — AZITHROMYCIN 250 MILLIGRAM(S): 500 TABLET, FILM COATED ORAL at 14:04

## 2018-03-13 RX ADMIN — Medication 20 MILLIEQUIVALENT(S): at 14:04

## 2018-03-13 RX ADMIN — Medication 100 MILLIGRAM(S): at 16:13

## 2018-03-13 RX ADMIN — Medication 3 MILLILITER(S): at 00:45

## 2018-03-13 RX ADMIN — Medication 10 MILLIGRAM(S): at 06:35

## 2018-03-13 RX ADMIN — Medication 4: at 13:08

## 2018-03-13 RX ADMIN — MAGNESIUM OXIDE 400 MG ORAL TABLET 400 MILLIGRAM(S): 241.3 TABLET ORAL at 10:00

## 2018-03-13 RX ADMIN — Medication 3 MILLILITER(S): at 14:04

## 2018-03-13 RX ADMIN — Medication 3 MILLILITER(S): at 06:35

## 2018-03-13 RX ADMIN — APIXABAN 5 MILLIGRAM(S): 2.5 TABLET, FILM COATED ORAL at 06:36

## 2018-03-13 RX ADMIN — Medication 600 MILLIGRAM(S): at 06:36

## 2018-03-13 RX ADMIN — Medication 20 MILLIGRAM(S): at 06:36

## 2018-03-13 RX ADMIN — Medication 1: at 10:01

## 2018-03-13 RX ADMIN — FAMOTIDINE 20 MILLIGRAM(S): 10 INJECTION INTRAVENOUS at 06:36

## 2018-03-13 RX ADMIN — LORATADINE 10 MILLIGRAM(S): 10 TABLET ORAL at 14:04

## 2018-03-13 RX ADMIN — Medication 1 SPRAY(S): at 06:37

## 2018-03-13 NOTE — PROGRESS NOTE ADULT - PROBLEM SELECTOR PLAN 7
-Will start senna, colace, and miralax.   -Will add other stronger agents if necessary.
-Will start senna, colace, and miralax.   -Will add other stronger agents if necessary.
start senna, colace, and miralax.

## 2018-03-13 NOTE — PROGRESS NOTE ADULT - PROBLEM SELECTOR PLAN 4
-Had a recent admission with coronavirus positive with asthma worsening.   -On Z-pack and prednisone prior to admission, will likely need to resume pending pulmonary recs.   -C/w symbicort.   -Pulmonary consulted, follow up recs.  -Will change guaifenesin to 600mg ER twice daily standing.  -C/w claritin and singulair.   -C/w spiriva.  -C/w albuterol nebs Q6H as needed, may need to change to duonebs.  -Sputum culture.  -Will get CT chest C- to further evaluate.
-Had a recent admission with coronavirus positive with asthma worsening.   -On Z-pack and prednisone prior to admission, will likely need to resume pending pulmonary recs.   -C/w symbicort.   -Pulmonary consulted, follow up recs.  -Will change guaifenesin to 600mg ER twice daily standing.  -C/w claritin and singulair.   -C/w spiriva.  -C/w albuterol nebs Q6H as needed, may need to change to duonebs.  -Sputum culture.  -Will get CT chest C- to further evaluate.
-Had a recent admission with coronavirus positive with asthma worsening.   -Z-pack and prednisone as per pulm.   -C/w symbicort.
-c/w apixaban.   -C/w ASA daily.  -Patient not on BB, possibly due to asthma history.
-c/w apixaban.   -C/w ASA daily.  -Patient not on BB, possibly due to asthma history.
c/w lantus 6qhs  aiss fs tid ac

## 2018-03-13 NOTE — PROGRESS NOTE ADULT - PROBLEM SELECTOR PLAN 5
-Will increase famotidine to 20mg twice daily.   -Possibility that chest pain is due to GERD.
c/w famotidine 20mg daily

## 2018-03-13 NOTE — PROGRESS NOTE ADULT - PROBLEM SELECTOR PLAN 8
-On Apixaban which will cover DVT PPx.   -Ambulate as tolerated.
dvt proph-On Apixaban  -Ambulate as tolerated.
-On Apixaban which will cover DVT PPx.   -Ambulate as tolerated.

## 2018-03-13 NOTE — PROGRESS NOTE ADULT - PROBLEM SELECTOR PROBLEM 2
CHF (congestive heart failure)
Chronic systolic heart failure
Atrial flutter
Exacerbation of asthma, unspecified asthma severity, unspecified whether persistent
Chronic systolic heart failure

## 2018-03-13 NOTE — PROGRESS NOTE ADULT - PROBLEM SELECTOR PROBLEM 4
Uncomplicated asthma, unspecified asthma severity, unspecified whether persistent
Atrial flutter, unspecified type
Uncomplicated asthma, unspecified asthma severity, unspecified whether persistent
Uncomplicated asthma, unspecified asthma severity, unspecified whether persistent
Atrial flutter, unspecified type
Type 2 diabetes mellitus

## 2018-03-13 NOTE — PROGRESS NOTE ADULT - PROVIDER SPECIALTY LIST ADULT
Cardiology
Hospitalist
Internal Medicine
Pulmonology
Internal Medicine
Pulmonology
Internal Medicine

## 2018-03-13 NOTE — PROGRESS NOTE ADULT - PROBLEM SELECTOR PROBLEM 3
Atrial flutter, unspecified type
Hyperglycemia
Atrial flutter, unspecified type
Atrial flutter, unspecified type
Chest wall pain
Hyperglycemia
Hyperglycemia
COPD (chronic obstructive pulmonary disease)
Chronic systolic heart failure
Hyperglycemia

## 2018-03-13 NOTE — PROGRESS NOTE ADULT - PROBLEM SELECTOR PROBLEM 1
Asthma
Chest wall pain
Exacerbation of asthma, unspecified asthma severity, unspecified whether persistent
Acute on chronic systolic heart failure
Chest wall pain

## 2018-03-13 NOTE — PROGRESS NOTE ADULT - PROBLEM SELECTOR PLAN 6
-C/w lantus 6 units at bedtime.  -C/w PASTORA QAC/HS.   -DASH diabetic diet.  -HbA1c ordered.
FS elevated but only short course of steroids c/w iss for coverage  -increase lantus to home dose 8 units qhs   -DASH diabetic diet.  -HbA1c 7.0
FS elevated but only short course of steroids c/w iss for coverage  -increase lantus to home dose 8 units qhs   -DASH diabetic diet.  -HbA1c 7.0

## 2018-03-13 NOTE — PROGRESS NOTE ADULT - PROBLEM SELECTOR PROBLEM 5
Gastroesophageal reflux disease without esophagitis
GERD (gastroesophageal reflux disease)
Gastroesophageal reflux disease without esophagitis

## 2018-03-13 NOTE — PROGRESS NOTE ADULT - PROBLEM SELECTOR PLAN 2
- HF team unable to see patient but reviewed chart and states cardiomems can be evaluated as outpatient with Dr. Hobson and to discharge as per previous note on lasix 40mg bid.    spironolactone d/jeffry due to elevated K - increased lasix - k 4.6 - restart spironolactone at 12.5mg qd  -C/w enalapril 10mg twice daily.  -Due for ICD interrogation as outpatient.   d/c telemetry  -Monitor I's/O's.

## 2018-03-13 NOTE — PROGRESS NOTE ADULT - PROBLEM SELECTOR PROBLEM 7
Constipation, unspecified constipation type
Principal Discharge DX:	Bartholin's gland abscess  Instructions for follow-up, activity and diet:	1. return for worsening symptoms or anything concerning to you  2. take all home meds as prescribed  3. follow up with your pmd call to make an appointment  4. use sitz baths and warm compresses as directed  5. follow up with obgyn call [phone]
Principal Discharge DX:	Bartholin's gland abscess  Instructions for follow-up, activity and diet:	1. return for worsening symptoms or anything concerning to you  2. take all home meds as prescribed  3. follow up with your pmd call to make an appointment  4. use sitz baths and warm compresses as directed  5. follow up with obgyn call [phone]

## 2018-03-13 NOTE — CONSULT NOTE ADULT - ASSESSMENT
72 F with PMHx of Asthma with frequent exacerbation, systolic CHF, s/p AICD, aflutter on Eliquis, DM2, diverticulitis, gout, HTN, HLD, COPD, hx of respiratory failure and trach who was transferred from [facility] on 3/7 for + trops, found to have clean coronaries, admitted for presumed acute on chronic HF and asthma exacerbation. Sputum culture grew carbapenem-resistant pseudomonas in the sputum with negative procalcitonin. Patient with no fever, chills, worsening cough. Appears stable, non-toxic.     Recommend:   - Hold off on treatment for pseudomonas. Likely contamination  - Continue with azithromycin per pulm     Wen Kaur MD   PGY 3 - Internal Medicine

## 2018-03-13 NOTE — CONSULT NOTE ADULT - SUBJECTIVE AND OBJECTIVE BOX
CHIEF COMPLAINT: transferred from OSH for cardiac cath     HPI: 72F with PMHx of Asthma with frequent exacerbation, systolic CHF, s/p AICD, aflutter on Eliquis, DM2, diverticulitis, gout, HTN, HLD, COPD, hx of respiratory failure and trach who was transferred from [facility] on 3/7 for + trops. Cardiac cath showed clean coronaries. Additionally patient was being treated at [facility] for presumed asthma exacerbation with Prednisone 20mg qd, Azithromycin 250mg qd. Documentation from [place]: diffuse wheezing on exam with normal CXR. +cough with yellow phlegm. +sick contact-daughter. Patient admitted here for presumed acute on chronic heart failure.     PAST MEDICAL & SURGICAL HISTORY:  Atrial flutter  Diverticulitis  Cardiomyopathy  Kidney stone  COPD (chronic obstructive pulmonary disease)  Cardiac Pacemaker  HTN - Hypertension  Gout  Diabetes  Congestive Heart Failure  Asthma  S/P cholecystectomy  AICD (Automatic Cardioverter/Defibrillator) Present: inserted in Aug, 2008. Due for battery change in 1 month. ( [employer]) . Inserted by Dr Duffy      FAMILY HISTORY:  Family history of brain tumor (Mother)      SOCIAL HISTORY:  Smoking: __ packs x ___ years  EtOH Use:  Marital Status:  Occupation:  Recent Travel:  Country of Birth:  Advance Directives:    Allergies    Coreg (Other)  digoxin (Other; Short breath (Mild to Mod))  penicillins (Hives)  Solu-Medrol (Other)    Intolerances    metoprolol (Other)      HOME MEDICATIONS:    REVIEW OF SYSTEMS:  Constitutional:   Eyes:  ENT:  CV:  Resp:  GI:  :  MSK:  Integumentary:  Neurological:  Psychiatric:  Endocrine:  Hematologic/Lymphatic:  Allergic/Immunologic:  [ ] All other systems negative  [ ] Unable to assess ROS because ________    OBJECTIVE:  ICU Vital Signs Last 24 Hrs  T(C): 36.6 (13 Mar 2018 04:26), Max: 36.8 (12 Mar 2018 13:15)  T(F): 97.8 (13 Mar 2018 04:26), Max: 98.3 (12 Mar 2018 13:15)  HR: 90 (13 Mar 2018 04:26) (86 - 97)  BP: 126/72 (13 Mar 2018 04:26) (104/70 - 126/72)  BP(mean): --  ABP: --  ABP(mean): --  RR: 17 (13 Mar 2018 04:26) (17 - 18)  SpO2: 96% (13 Mar 2018 04:26) (96% - 99%)        03-12 @ 07:01  -  03-13 @ 07:00  --------------------------------------------------------  IN: 480 mL / OUT: 750 mL / NET: -270 mL      CAPILLARY BLOOD GLUCOSE      POCT Blood Glucose.: 173 mg/dL (13 Mar 2018 09:15)      PHYSICAL EXAM:  General:   HEENT:   Lymph Nodes:  Neck:   Respiratory:   Cardiovascular:   Abdomen:   Extremities:   Skin:   Neurological:  Psychiatry:    HOSPITAL MEDICATIONS:  MEDICATIONS  (STANDING):  ALBUTerol/ipratropium for Nebulization 3 milliLiter(s) Nebulizer every 6 hours  apixaban 5 milliGRAM(s) Oral every 12 hours  aspirin enteric coated 81 milliGRAM(s) Oral daily  atorvastatin 20 milliGRAM(s) Oral at bedtime  azithromycin   Tablet 250 milliGRAM(s) Oral daily  buDESOnide 160 MICROgram(s)/formoterol 4.5 MICROgram(s) Inhaler 2 Puff(s) Inhalation two times a day  dextrose 5%. 1000 milliLiter(s) (50 mL/Hr) IV Continuous <Continuous>  dextrose 50% Injectable 12.5 Gram(s) IV Push once  dextrose 50% Injectable 25 Gram(s) IV Push once  dextrose 50% Injectable 25 Gram(s) IV Push once  docusate sodium 100 milliGRAM(s) Oral three times a day  enalapril 10 milliGRAM(s) Oral two times a day  famotidine    Tablet 20 milliGRAM(s) Oral two times a day  fluticasone propionate 50 MICROgram(s)/spray Nasal Spray 1 Spray(s) Both Nostrils two times a day  furosemide    Tablet 40 milliGRAM(s) Oral two times a day  guaiFENesin  milliGRAM(s) Oral every 12 hours  insulin glargine Injectable (LANTUS) 6 Unit(s) SubCutaneous at bedtime  insulin lispro (HumaLOG) corrective regimen sliding scale   SubCutaneous three times a day before meals  insulin lispro (HumaLOG) corrective regimen sliding scale   SubCutaneous at bedtime  loratadine 10 milliGRAM(s) Oral daily  magnesium oxide 400 milliGRAM(s) Oral three times a day with meals  montelukast 10 milliGRAM(s) Oral daily  polyethylene glycol 3350 17 Gram(s) Oral daily  potassium chloride   Solution 20 milliEquivalent(s) Oral daily  predniSONE   Tablet 20 milliGRAM(s) Oral daily  senna 2 Tablet(s) Oral at bedtime  sodium chloride 0.9% lock flush 3 milliLiter(s) IV Push every 8 hours  tiotropium 18 MICROgram(s) Capsule 1 Capsule(s) Inhalation daily    MEDICATIONS  (PRN):  acetaminophen   Tablet. 650 milliGRAM(s) Oral every 6 hours PRN Moderate Pain (4 - 6)  dextrose Gel 1 Dose(s) Oral once PRN Blood Glucose LESS THAN 70 milliGRAM(s)/deciliter  glucagon  Injectable 1 milliGRAM(s) IntraMuscular once PRN Glucose LESS THAN 70 milligrams/deciliter  guaiFENesin   Syrup  (Sugar-Free) 200 milliGRAM(s) Oral every 6 hours PRN Cough  sodium chloride 0.65% Nasal 1 Spray(s) Both Nostrils every 2 hours PRN Nasal Congestion      LABS:    03-13    136  |  97  |  48<H>  ----------------------------<  182<H>  4.6   |  27  |  1.26    Ca    9.4      13 Mar 2018 06:23  Mg     2.2     03-13      Procalcitonin <0.05           MICROBIOLOGY:     RADIOLOGY:  [ ] Reviewed and interpreted by me    EKG: CHIEF COMPLAINT: transferred from OSH for cardiac cath     HPI: 72F with PMHx of Asthma with frequent exacerbation, systolic CHF, s/p AICD, aflutter on Eliquis, DM2, diverticulitis, gout, HTN, HLD, COPD, hx of respiratory failure and trach who was transferred from [facility] on 3/7 for + trops. Cardiac cath showed clean coronaries. Additionally patient was being treated at [facility] for presumed asthma exacerbation with Prednisone 20mg qd, Azithromycin 250mg qd. Documentation from [place]: diffuse wheezing on exam with normal CXR. +cough with yellow phlegm. +sick contact-daughter. Patient admitted here for presumed acute on chronic heart failure.     Above reviewed. Patient has baseline cough, not worse than baseline. No fevers, no chills. Patient here for elevated troponins. No shortness of breath, no sputum production. No abd pain, no N/V/D. Transferred from [facility], had asthma exacerbation. Patient had positive sputum culture for CRE pseudomonas, few signs active infection. Otherwise no events noted.    PAST MEDICAL & SURGICAL HISTORY:  Atrial flutter  Diverticulitis  Cardiomyopathy  Kidney stone  COPD (chronic obstructive pulmonary disease)  Cardiac Pacemaker  HTN - Hypertension  Gout  Diabetes  Congestive Heart Failure  Asthma  S/P cholecystectomy  AICD (Automatic Cardioverter/Defibrillator) Present: inserted in Aug, 2008. Due for battery change in 1 month. ( [email or web address]) . Inserted by Dr Duffy    FAMILY HISTORY:  Family history of brain tumor (Mother)    SOCIAL HISTORY:  No tobacco, no alcohol, no illicit drugs    Allergies    Coreg (Other)  digoxin (Other; Short breath (Mild to Mod))  penicillins (Hives)  Solu-Medrol (Other)    Intolerances    metoprolol (Other)    HOME MEDICATIONS:    OBJECTIVE:  ICU Vital Signs Last 24 Hrs  T(C): 36.6 (13 Mar 2018 04:26), Max: 36.8 (12 Mar 2018 13:15)  T(F): 97.8 (13 Mar 2018 04:26), Max: 98.3 (12 Mar 2018 13:15)  HR: 90 (13 Mar 2018 04:26) (86 - 97)  BP: 126/72 (13 Mar 2018 04:26) (104/70 - 126/72)  BP(mean): --  ABP: --  ABP(mean): --  RR: 17 (13 Mar 2018 04:26) (17 - 18)  SpO2: 96% (13 Mar 2018 04:26) (96% - 99%)    PHYSICAL EXAM:  Gen: AOx3, NAD, non-toxic, pleasant  CV: S1+S2 normal, no murmurs, nontachycardic  Resp: Clear bilat, no resp distress, no crackles/wheezes  Abd: Soft, nontender, +BS  Ext: No LE edema, no wounds  : No Ching  IV/Skin: No thrombophlebitis  Msk: No low back pain, no arthralgias, no joint swelling  Neuro: No sensory deficits, no motor deficits    HOSPITAL MEDICATIONS:  MEDICATIONS  (STANDING):  ALBUTerol/ipratropium for Nebulization 3 milliLiter(s) Nebulizer every 6 hours  apixaban 5 milliGRAM(s) Oral every 12 hours  aspirin enteric coated 81 milliGRAM(s) Oral daily  atorvastatin 20 milliGRAM(s) Oral at bedtime  azithromycin   Tablet 250 milliGRAM(s) Oral daily  buDESOnide 160 MICROgram(s)/formoterol 4.5 MICROgram(s) Inhaler 2 Puff(s) Inhalation two times a day  dextrose 5%. 1000 milliLiter(s) (50 mL/Hr) IV Continuous <Continuous>  dextrose 50% Injectable 12.5 Gram(s) IV Push once  dextrose 50% Injectable 25 Gram(s) IV Push once  dextrose 50% Injectable 25 Gram(s) IV Push once  docusate sodium 100 milliGRAM(s) Oral three times a day  enalapril 10 milliGRAM(s) Oral two times a day  famotidine    Tablet 20 milliGRAM(s) Oral two times a day  fluticasone propionate 50 MICROgram(s)/spray Nasal Spray 1 Spray(s) Both Nostrils two times a day  furosemide    Tablet 40 milliGRAM(s) Oral two times a day  guaiFENesin  milliGRAM(s) Oral every 12 hours  insulin glargine Injectable (LANTUS) 6 Unit(s) SubCutaneous at bedtime  insulin lispro (HumaLOG) corrective regimen sliding scale   SubCutaneous three times a day before meals  insulin lispro (HumaLOG) corrective regimen sliding scale   SubCutaneous at bedtime  loratadine 10 milliGRAM(s) Oral daily  magnesium oxide 400 milliGRAM(s) Oral three times a day with meals  montelukast 10 milliGRAM(s) Oral daily  polyethylene glycol 3350 17 Gram(s) Oral daily  potassium chloride   Solution 20 milliEquivalent(s) Oral daily  predniSONE   Tablet 20 milliGRAM(s) Oral daily  senna 2 Tablet(s) Oral at bedtime  sodium chloride 0.9% lock flush 3 milliLiter(s) IV Push every 8 hours  tiotropium 18 MICROgram(s) Capsule 1 Capsule(s) Inhalation daily    MEDICATIONS  (PRN):  acetaminophen   Tablet. 650 milliGRAM(s) Oral every 6 hours PRN Moderate Pain (4 - 6)  dextrose Gel 1 Dose(s) Oral once PRN Blood Glucose LESS THAN 70 milliGRAM(s)/deciliter  glucagon  Injectable 1 milliGRAM(s) IntraMuscular once PRN Glucose LESS THAN 70 milligrams/deciliter  guaiFENesin   Syrup  (Sugar-Free) 200 milliGRAM(s) Oral every 6 hours PRN Cough  sodium chloride 0.65% Nasal 1 Spray(s) Both Nostrils every 2 hours PRN Nasal Congestion    LABS:    03-13    136  |  97  |  48<H>  ----------------------------<  182<H>  4.6   |  27  |  1.26    Ca    9.4      13 Mar 2018 06:23  Mg     2.2     03-13    Procalcitonin <0.05     MICROBIOLOGY:    .Sputum Sputum  03-10-18   Numerous Mucin producing Pseudomonas aeruginosa (Carbapenem Resistant) #1  Numerous Pseudomonas aeruginosa (Carbapenem Resistant) #2  Normal Respiratory Liz present  --  Pseudomonas aeruginosa (Carbapenem Resistant)  Pseudomonas aeruginosa (Carbapenem Resistant)    Rapid RVP Result: NotDetec (03-09 @ 17:23)    RADIOLOGY:    3/9 CT Chest:    IMPRESSION:    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     Scattered areas of nodularity in the bilateral upper lobes and right   lower lobe, with the largest nodule in the left upper lobe measuring   approximately 7 mm on series 3, image 32.    Bibasilar bronchial wall thickening and mucoid impaction, which may be   from asthma or bronchitis. CHIEF COMPLAINT: transferred from OSH for cardiac cath     HPI: 72 F with PMHx of Asthma with frequent exacerbation, systolic CHF, s/p AICD, aflutter on Eliquis, DM2, diverticulitis, gout, HTN, HLD, COPD, hx of respiratory failure and trach who was transferred from [facility] on 3/7 for + trops. Cardiac cath showed clean coronaries. Additionally patient was being treated at [facility] for presumed asthma exacerbation with Prednisone 20mg qd, Azithromycin 250mg qd. Documentation from [place]: diffuse wheezing on exam with normal CXR. +cough with yellow phlegm. +sick contact-daughter. Patient admitted here for presumed acute on chronic heart failure.     Above reviewed. Patient has baseline cough, not worse than baseline. No fevers, no chills. Patient here for elevated troponins. No shortness of breath, no sputum production. No abd pain, no N/V/D. Transferred from [facility], had asthma exacerbation. Patient had positive sputum culture for CRE pseudomonas, few signs active infection. Otherwise no events noted.    PAST MEDICAL & SURGICAL HISTORY:  Atrial flutter  Diverticulitis  Cardiomyopathy  Kidney stone  COPD (chronic obstructive pulmonary disease)  Cardiac Pacemaker  HTN - Hypertension  Gout  Diabetes  Congestive Heart Failure  Asthma  S/P cholecystectomy  AICD (Automatic Cardioverter/Defibrillator) Present: inserted in Aug, 2008. Due for battery change in 1 month. ( [employer]) . Inserted by Dr Duffy    FAMILY HISTORY:  Family history of brain tumor (Mother)    SOCIAL HISTORY:  No tobacco, no alcohol, no illicit drugs    Allergies    Coreg (Other)  digoxin (Other; Short breath (Mild to Mod))  penicillins (Hives)  Solu-Medrol (Other)    Intolerances    metoprolol (Other)    HOME MEDICATIONS:    OBJECTIVE:  ICU Vital Signs Last 24 Hrs  T(C): 36.6 (13 Mar 2018 04:26), Max: 36.8 (12 Mar 2018 13:15)  T(F): 97.8 (13 Mar 2018 04:26), Max: 98.3 (12 Mar 2018 13:15)  HR: 90 (13 Mar 2018 04:26) (86 - 97)  BP: 126/72 (13 Mar 2018 04:26) (104/70 - 126/72)  BP(mean): --  ABP: --  ABP(mean): --  RR: 17 (13 Mar 2018 04:26) (17 - 18)  SpO2: 96% (13 Mar 2018 04:26) (96% - 99%)    PHYSICAL EXAM:  Gen: AOx3, NAD, non-toxic, pleasant  CV: S1+S2 normal, no murmurs, nontachycardic  Resp: Clear bilat, no resp distress, no crackles/wheezes  Abd: Soft, nontender, +BS  Ext: No LE edema, no wounds  : No Ching  IV/Skin: No thrombophlebitis  Msk: No low back pain, no arthralgias, no joint swelling  Neuro: No sensory deficits, no motor deficits    HOSPITAL MEDICATIONS:  MEDICATIONS  (STANDING):  ALBUTerol/ipratropium for Nebulization 3 milliLiter(s) Nebulizer every 6 hours  apixaban 5 milliGRAM(s) Oral every 12 hours  aspirin enteric coated 81 milliGRAM(s) Oral daily  atorvastatin 20 milliGRAM(s) Oral at bedtime  azithromycin   Tablet 250 milliGRAM(s) Oral daily  buDESOnide 160 MICROgram(s)/formoterol 4.5 MICROgram(s) Inhaler 2 Puff(s) Inhalation two times a day  dextrose 5%. 1000 milliLiter(s) (50 mL/Hr) IV Continuous <Continuous>  dextrose 50% Injectable 12.5 Gram(s) IV Push once  dextrose 50% Injectable 25 Gram(s) IV Push once  dextrose 50% Injectable 25 Gram(s) IV Push once  docusate sodium 100 milliGRAM(s) Oral three times a day  enalapril 10 milliGRAM(s) Oral two times a day  famotidine    Tablet 20 milliGRAM(s) Oral two times a day  fluticasone propionate 50 MICROgram(s)/spray Nasal Spray 1 Spray(s) Both Nostrils two times a day  furosemide    Tablet 40 milliGRAM(s) Oral two times a day  guaiFENesin  milliGRAM(s) Oral every 12 hours  insulin glargine Injectable (LANTUS) 6 Unit(s) SubCutaneous at bedtime  insulin lispro (HumaLOG) corrective regimen sliding scale   SubCutaneous three times a day before meals  insulin lispro (HumaLOG) corrective regimen sliding scale   SubCutaneous at bedtime  loratadine 10 milliGRAM(s) Oral daily  magnesium oxide 400 milliGRAM(s) Oral three times a day with meals  montelukast 10 milliGRAM(s) Oral daily  polyethylene glycol 3350 17 Gram(s) Oral daily  potassium chloride   Solution 20 milliEquivalent(s) Oral daily  predniSONE   Tablet 20 milliGRAM(s) Oral daily  senna 2 Tablet(s) Oral at bedtime  sodium chloride 0.9% lock flush 3 milliLiter(s) IV Push every 8 hours  tiotropium 18 MICROgram(s) Capsule 1 Capsule(s) Inhalation daily    MEDICATIONS  (PRN):  acetaminophen   Tablet. 650 milliGRAM(s) Oral every 6 hours PRN Moderate Pain (4 - 6)  dextrose Gel 1 Dose(s) Oral once PRN Blood Glucose LESS THAN 70 milliGRAM(s)/deciliter  glucagon  Injectable 1 milliGRAM(s) IntraMuscular once PRN Glucose LESS THAN 70 milligrams/deciliter  guaiFENesin   Syrup  (Sugar-Free) 200 milliGRAM(s) Oral every 6 hours PRN Cough  sodium chloride 0.65% Nasal 1 Spray(s) Both Nostrils every 2 hours PRN Nasal Congestion    LABS:    03-13    136  |  97  |  48<H>  ----------------------------<  182<H>  4.6   |  27  |  1.26    Ca    9.4      13 Mar 2018 06:23  Mg     2.2     03-13    Procalcitonin <0.05     MICROBIOLOGY:    .Sputum Sputum  03-10-18   Numerous Mucin producing Pseudomonas aeruginosa (Carbapenem Resistant) #1  Numerous Pseudomonas aeruginosa (Carbapenem Resistant) #2  Normal Respiratory Liz present  --  Pseudomonas aeruginosa (Carbapenem Resistant)  Pseudomonas aeruginosa (Carbapenem Resistant)    Rapid RVP Result: NotDetec (03-09 @ 17:23)    RADIOLOGY:    3/9 CT Chest:    IMPRESSION:    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     Scattered areas of nodularity in the bilateral upper lobes and right   lower lobe, with the largest nodule in the left upper lobe measuring   approximately 7 mm on series 3, image 32.    Bibasilar bronchial wall thickening and mucoid impaction, which may be   from asthma or bronchitis. CHIEF COMPLAINT: transferred from OSH for cardiac cath     HPI: 72 F with PMHx of Asthma with frequent exacerbation, systolic CHF, s/p AICD, aflutter on Eliquis, DM2, diverticulitis, gout, HTN, HLD, COPD, hx of respiratory failure and trach who was transferred from [facility] on 3/7 for + trops. Cardiac cath showed clean coronaries. Additionally patient was being treated at [facility] for presumed asthma exacerbation with Prednisone 20mg qd, Azithromycin 250mg qd. Documentation from [place]: diffuse wheezing on exam with normal CXR. +cough with yellow phlegm. +sick contact-daughter. Patient admitted here for presumed acute on chronic heart failure.     Above reviewed. Patient has baseline cough, not worse than baseline. No fevers, no chills. Patient here for elevated troponins. No shortness of breath, no sputum production. No abd pain, no N/V/D. Transferred from [facility], had asthma exacerbation. Patient had positive sputum culture for CRE pseudomonas, few signs active infection. Otherwise no events noted.    PAST MEDICAL & SURGICAL HISTORY:  Atrial flutter  Diverticulitis  Cardiomyopathy  Kidney stone  COPD (chronic obstructive pulmonary disease)  Cardiac Pacemaker  HTN - Hypertension  Gout  Diabetes  Congestive Heart Failure  Asthma  S/P cholecystectomy  AICD (Automatic Cardioverter/Defibrillator) Present: inserted in Aug, 2008. Due for battery change in 1 month. ( [employer]) . Inserted by Dr Duffy    FAMILY HISTORY:  Family history of brain tumor (Mother)    SOCIAL HISTORY:  No tobacco, no alcohol, no illicit drugs    Allergies    Coreg (Other)  digoxin (Other; Short breath (Mild to Mod))  penicillins (Hives)  Solu-Medrol (Other)    Intolerances    metoprolol (Other)    HOME MEDICATIONS:    ICU Vital Signs Last 24 Hrs  T(C): 36.6 (13 Mar 2018 04:26), Max: 36.8 (12 Mar 2018 13:15)  T(F): 97.8 (13 Mar 2018 04:26), Max: 98.3 (12 Mar 2018 13:15)  HR: 90 (13 Mar 2018 04:26) (86 - 97)  BP: 126/72 (13 Mar 2018 04:26) (104/70 - 126/72)  BP(mean): --  ABP: --  ABP(mean): --  RR: 17 (13 Mar 2018 04:26) (17 - 18)  SpO2: 96% (13 Mar 2018 04:26) (96% - 99%)    PHYSICAL EXAM:  Gen: AOx3, NAD, non-toxic, pleasant  CV: S1+S2 normal, no murmurs, nontachycardic  Resp: Clear bilat, no resp distress, no crackles/wheezes  Abd: Soft, nontender, +BS  Ext: No LE edema, no wounds  : No suprapubic tenderness  IV/Skin: No thrombophlebitis, no rash  Msk: No low back pain, no arthralgias, no joint swelling  Neuro: No sensory deficits, no motor deficits    HOSPITAL MEDICATIONS:  MEDICATIONS  (STANDING):  ALBUTerol/ipratropium for Nebulization 3 milliLiter(s) Nebulizer every 6 hours  apixaban 5 milliGRAM(s) Oral every 12 hours  aspirin enteric coated 81 milliGRAM(s) Oral daily  atorvastatin 20 milliGRAM(s) Oral at bedtime  azithromycin   Tablet 250 milliGRAM(s) Oral daily  buDESOnide 160 MICROgram(s)/formoterol 4.5 MICROgram(s) Inhaler 2 Puff(s) Inhalation two times a day  dextrose 5%. 1000 milliLiter(s) (50 mL/Hr) IV Continuous <Continuous>  dextrose 50% Injectable 12.5 Gram(s) IV Push once  dextrose 50% Injectable 25 Gram(s) IV Push once  dextrose 50% Injectable 25 Gram(s) IV Push once  docusate sodium 100 milliGRAM(s) Oral three times a day  enalapril 10 milliGRAM(s) Oral two times a day  famotidine    Tablet 20 milliGRAM(s) Oral two times a day  fluticasone propionate 50 MICROgram(s)/spray Nasal Spray 1 Spray(s) Both Nostrils two times a day  furosemide    Tablet 40 milliGRAM(s) Oral two times a day  guaiFENesin  milliGRAM(s) Oral every 12 hours  insulin glargine Injectable (LANTUS) 6 Unit(s) SubCutaneous at bedtime  insulin lispro (HumaLOG) corrective regimen sliding scale   SubCutaneous three times a day before meals  insulin lispro (HumaLOG) corrective regimen sliding scale   SubCutaneous at bedtime  loratadine 10 milliGRAM(s) Oral daily  magnesium oxide 400 milliGRAM(s) Oral three times a day with meals  montelukast 10 milliGRAM(s) Oral daily  polyethylene glycol 3350 17 Gram(s) Oral daily  potassium chloride   Solution 20 milliEquivalent(s) Oral daily  predniSONE   Tablet 20 milliGRAM(s) Oral daily  senna 2 Tablet(s) Oral at bedtime  sodium chloride 0.9% lock flush 3 milliLiter(s) IV Push every 8 hours  tiotropium 18 MICROgram(s) Capsule 1 Capsule(s) Inhalation daily    MEDICATIONS  (PRN):  acetaminophen   Tablet. 650 milliGRAM(s) Oral every 6 hours PRN Moderate Pain (4 - 6)  dextrose Gel 1 Dose(s) Oral once PRN Blood Glucose LESS THAN 70 milliGRAM(s)/deciliter  glucagon  Injectable 1 milliGRAM(s) IntraMuscular once PRN Glucose LESS THAN 70 milligrams/deciliter  guaiFENesin   Syrup  (Sugar-Free) 200 milliGRAM(s) Oral every 6 hours PRN Cough  sodium chloride 0.65% Nasal 1 Spray(s) Both Nostrils every 2 hours PRN Nasal Congestion    LABS:    03-13    136  |  97  |  48<H>  ----------------------------<  182<H>  4.6   |  27  |  1.26    Ca    9.4      13 Mar 2018 06:23  Mg     2.2     03-13    Procalcitonin <0.05     MICROBIOLOGY:    .Sputum Sputum  03-10-18   Numerous Mucin producing Pseudomonas aeruginosa (Carbapenem Resistant) #1  Numerous Pseudomonas aeruginosa (Carbapenem Resistant) #2  Normal Respiratory Liz present  --  Pseudomonas aeruginosa (Carbapenem Resistant)  Pseudomonas aeruginosa (Carbapenem Resistant)    Rapid RVP Result: NotDetec (03-09 @ 17:23)    RADIOLOGY:    3/9 CT Chest:    IMPRESSION:    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     Scattered areas of nodularity in the bilateral upper lobes and right   lower lobe, with the largest nodule in the left upper lobe measuring   approximately 7 mm on series 3, image 32.    Bibasilar bronchial wall thickening and mucoid impaction, which may be   from asthma or bronchitis. CHIEF COMPLAINT: transferred from OSH for cardiac cath     HPI: 72 F with PMHx of Asthma with frequent exacerbation, systolic CHF, s/p AICD, aflutter on Eliquis, DM2, diverticulitis, gout, HTN, HLD, COPD, hx of respiratory failure and trach who was transferred from [facility] on 3/7 for + trops. Cardiac cath showed clean coronaries. Additionally patient was being treated at [facility] for presumed asthma exacerbation with Prednisone 20mg qd, Azithromycin 250mg qd. Documentation from [place]: diffuse wheezing on exam with normal CXR. +cough with yellow phlegm. +sick contact-daughter. Patient admitted here for presumed acute on chronic heart failure.     Above reviewed. Patient has baseline cough, not worse than baseline. No fevers, no chills. Patient here for elevated troponins. No shortness of breath, no sputum production. No abd pain, no N/V/D. Transferred from [facility], had asthma exacerbation. Patient had positive sputum culture for CRE pseudomonas, few signs active infection. Otherwise no events noted.    PAST MEDICAL & SURGICAL HISTORY:  Atrial flutter  Diverticulitis  Cardiomyopathy  Kidney stone  COPD (chronic obstructive pulmonary disease)  Cardiac Pacemaker  HTN - Hypertension  Gout  Diabetes  Congestive Heart Failure  Asthma  S/P cholecystectomy  AICD (Automatic Cardioverter/Defibrillator) Present: inserted in Aug, 2008. Due for battery change in 1 month. ( [employer]) . Inserted by Dr Duffy    FAMILY HISTORY:  Family history of brain tumor (Mother)    SOCIAL HISTORY:  No tobacco, no alcohol, no illicit drugs    Allergies    Coreg (Other)  digoxin (Other; Short breath (Mild to Mod))  penicillins (Hives)  Solu-Medrol (Other)    Intolerances    metoprolol (Other)    ICU Vital Signs Last 24 Hrs  T(C): 36.6 (13 Mar 2018 04:26), Max: 36.8 (12 Mar 2018 13:15)  T(F): 97.8 (13 Mar 2018 04:26), Max: 98.3 (12 Mar 2018 13:15)  HR: 90 (13 Mar 2018 04:26) (86 - 97)  BP: 126/72 (13 Mar 2018 04:26) (104/70 - 126/72)  BP(mean): --  ABP: --  ABP(mean): --  RR: 17 (13 Mar 2018 04:26) (17 - 18)  SpO2: 96% (13 Mar 2018 04:26) (96% - 99%)    PHYSICAL EXAM:  Gen: AOx3, NAD, non-toxic, pleasant  CV: S1+S2 normal, no murmurs, nontachycardic  Resp: Clear bilat, no resp distress, no crackles/wheezes  Abd: Soft, nontender, +BS  Ext: No LE edema, no wounds  : No suprapubic tenderness  IV/Skin: No thrombophlebitis, no rash  Msk: No low back pain, no arthralgias, no joint swelling  Neuro: No sensory deficits, no motor deficits    HOSPITAL MEDICATIONS:  MEDICATIONS  (STANDING):  ALBUTerol/ipratropium for Nebulization 3 milliLiter(s) Nebulizer every 6 hours  apixaban 5 milliGRAM(s) Oral every 12 hours  aspirin enteric coated 81 milliGRAM(s) Oral daily  atorvastatin 20 milliGRAM(s) Oral at bedtime  azithromycin   Tablet 250 milliGRAM(s) Oral daily  buDESOnide 160 MICROgram(s)/formoterol 4.5 MICROgram(s) Inhaler 2 Puff(s) Inhalation two times a day  dextrose 5%. 1000 milliLiter(s) (50 mL/Hr) IV Continuous <Continuous>  dextrose 50% Injectable 12.5 Gram(s) IV Push once  dextrose 50% Injectable 25 Gram(s) IV Push once  dextrose 50% Injectable 25 Gram(s) IV Push once  docusate sodium 100 milliGRAM(s) Oral three times a day  enalapril 10 milliGRAM(s) Oral two times a day  famotidine    Tablet 20 milliGRAM(s) Oral two times a day  fluticasone propionate 50 MICROgram(s)/spray Nasal Spray 1 Spray(s) Both Nostrils two times a day  furosemide    Tablet 40 milliGRAM(s) Oral two times a day  guaiFENesin  milliGRAM(s) Oral every 12 hours  insulin glargine Injectable (LANTUS) 6 Unit(s) SubCutaneous at bedtime  insulin lispro (HumaLOG) corrective regimen sliding scale   SubCutaneous three times a day before meals  insulin lispro (HumaLOG) corrective regimen sliding scale   SubCutaneous at bedtime  loratadine 10 milliGRAM(s) Oral daily  magnesium oxide 400 milliGRAM(s) Oral three times a day with meals  montelukast 10 milliGRAM(s) Oral daily  polyethylene glycol 3350 17 Gram(s) Oral daily  potassium chloride   Solution 20 milliEquivalent(s) Oral daily  predniSONE   Tablet 20 milliGRAM(s) Oral daily  senna 2 Tablet(s) Oral at bedtime  sodium chloride 0.9% lock flush 3 milliLiter(s) IV Push every 8 hours  tiotropium 18 MICROgram(s) Capsule 1 Capsule(s) Inhalation daily    MEDICATIONS  (PRN):  acetaminophen   Tablet. 650 milliGRAM(s) Oral every 6 hours PRN Moderate Pain (4 - 6)  dextrose Gel 1 Dose(s) Oral once PRN Blood Glucose LESS THAN 70 milliGRAM(s)/deciliter  glucagon  Injectable 1 milliGRAM(s) IntraMuscular once PRN Glucose LESS THAN 70 milligrams/deciliter  guaiFENesin   Syrup  (Sugar-Free) 200 milliGRAM(s) Oral every 6 hours PRN Cough  sodium chloride 0.65% Nasal 1 Spray(s) Both Nostrils every 2 hours PRN Nasal Congestion    LABS:    03-13    136  |  97  |  48<H>  ----------------------------<  182<H>  4.6   |  27  |  1.26    Ca    9.4      13 Mar 2018 06:23  Mg     2.2     03-13    Procalcitonin <0.05     MICROBIOLOGY:    .Sputum Sputum  03-10-18   Numerous Mucin producing Pseudomonas aeruginosa (Carbapenem Resistant) #1  Numerous Pseudomonas aeruginosa (Carbapenem Resistant) #2  Normal Respiratory Liz present  --  Pseudomonas aeruginosa (Carbapenem Resistant)  Pseudomonas aeruginosa (Carbapenem Resistant)    Rapid RVP Result: NotDetec (03-09 @ 17:23)    RADIOLOGY:    3/9 CT Chest:    IMPRESSION:    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     Scattered areas of nodularity in the bilateral upper lobes and right   lower lobe, with the largest nodule in the left upper lobe measuring   approximately 7 mm on series 3, image 32.    Bibasilar bronchial wall thickening and mucoid impaction, which may be   from asthma or bronchitis.

## 2018-03-13 NOTE — PROGRESS NOTE ADULT - SUBJECTIVE AND OBJECTIVE BOX
Follow-up Pulm Progress Note    Still complains of intermittent cough. Added flonase yesterday. No fever, no WBC.     Medications:  MEDICATIONS  (STANDING):  ALBUTerol/ipratropium for Nebulization 3 milliLiter(s) Nebulizer every 6 hours  apixaban 5 milliGRAM(s) Oral every 12 hours  aspirin enteric coated 81 milliGRAM(s) Oral daily  atorvastatin 20 milliGRAM(s) Oral at bedtime  azithromycin   Tablet 250 milliGRAM(s) Oral daily  buDESOnide 160 MICROgram(s)/formoterol 4.5 MICROgram(s) Inhaler 2 Puff(s) Inhalation two times a day  dextrose 5%. 1000 milliLiter(s) (50 mL/Hr) IV Continuous <Continuous>  dextrose 50% Injectable 12.5 Gram(s) IV Push once  dextrose 50% Injectable 25 Gram(s) IV Push once  dextrose 50% Injectable 25 Gram(s) IV Push once  docusate sodium 100 milliGRAM(s) Oral three times a day  enalapril 10 milliGRAM(s) Oral two times a day  famotidine    Tablet 20 milliGRAM(s) Oral two times a day  fluticasone propionate 50 MICROgram(s)/spray Nasal Spray 1 Spray(s) Both Nostrils two times a day  furosemide    Tablet 40 milliGRAM(s) Oral two times a day  guaiFENesin  milliGRAM(s) Oral every 12 hours  insulin glargine Injectable (LANTUS) 6 Unit(s) SubCutaneous at bedtime  insulin lispro (HumaLOG) corrective regimen sliding scale   SubCutaneous three times a day before meals  insulin lispro (HumaLOG) corrective regimen sliding scale   SubCutaneous at bedtime  loratadine 10 milliGRAM(s) Oral daily  magnesium oxide 400 milliGRAM(s) Oral three times a day with meals  montelukast 10 milliGRAM(s) Oral daily  polyethylene glycol 3350 17 Gram(s) Oral daily  potassium chloride   Solution 20 milliEquivalent(s) Oral daily  predniSONE   Tablet 20 milliGRAM(s) Oral daily  senna 2 Tablet(s) Oral at bedtime  sodium chloride 0.9% lock flush 3 milliLiter(s) IV Push every 8 hours  tiotropium 18 MICROgram(s) Capsule 1 Capsule(s) Inhalation daily    MEDICATIONS  (PRN):  acetaminophen   Tablet. 650 milliGRAM(s) Oral every 6 hours PRN Moderate Pain (4 - 6)  dextrose Gel 1 Dose(s) Oral once PRN Blood Glucose LESS THAN 70 milliGRAM(s)/deciliter  glucagon  Injectable 1 milliGRAM(s) IntraMuscular once PRN Glucose LESS THAN 70 milligrams/deciliter  guaiFENesin   Syrup  (Sugar-Free) 200 milliGRAM(s) Oral every 6 hours PRN Cough  sodium chloride 0.65% Nasal 1 Spray(s) Both Nostrils every 2 hours PRN Nasal Congestion    Vital Signs Last 24 Hrs  T(C): 36.6 (13 Mar 2018 04:26), Max: 36.8 (12 Mar 2018 13:15)  T(F): 97.8 (13 Mar 2018 04:26), Max: 98.3 (12 Mar 2018 13:15)  HR: 90 (13 Mar 2018 04:26) (86 - 97)  BP: 126/72 (13 Mar 2018 04:26) (104/70 - 126/72)  BP(mean): --  RR: 17 (13 Mar 2018 04:26) (17 - 18)  SpO2: 96% (13 Mar 2018 04:26) (96% - 99%)    03-12 @ 07:01  -  03-13 @ 07:00  --------------------------------------------------------  IN: 480 mL / OUT: 750 mL / NET: -270 mL    LABS:    03-13    136  |  97  |  48<H>  ----------------------------<  182<H>  4.6   |  27  |  1.26    Ca    9.4      13 Mar 2018 06:23  Mg     2.2     03-13    CAPILLARY BLOOD GLUCOSE    POCT Blood Glucose.: 173 mg/dL (13 Mar 2018 09:15)    CULTURES: (if applicable)    Physical Examination:  PULM: Decreased BS at bases  CVS: S1, S2 heard    Sputum culture: Pseudomonas sensitive to Levaquin and others not sensitive to Levaquin    RADIOLOGY REVIEWED    CXR:     CT chest:    TTE:

## 2018-03-13 NOTE — PROGRESS NOTE ADULT - ATTENDING COMMENTS
Dr. WILLY BirdThe [facility] Systemist  [phone]
Boyd Pollard MD  Division of [facility] Medicine  Cell: [phone]  Pager: [phone]  Office: [phone]
short course of prednisone/azithro  pseudomonas in sputum but PCT negative, cough not productive, getting azithro  d/c planning  consider daliresp as outpt
pt hesistant for steroids but she is still tight on exam  consider [facility] as outpt  pt unhappy with multiple oral steroid courses
short course of prednisone/azithro  pseudomonas in sputum but PCT negative, cough not productive, no fever, no WBC  can consider ID consult but I would not treat this as source of cough (would require IV abx)  d/c planning  consider [facility] as outpt
discharge time - 38 minutes  Dr. M. Luke  [facility] Hospitalist  [phone]

## 2018-03-13 NOTE — PROGRESS NOTE ADULT - ASSESSMENT
71 y/o F with PMH asthma with frequent exacerbation (on oral steroids >5x per year), respiratory failure 2016 in the setting of Flu/PNA, HFrEF (mild LV dysfxn 2017), s/p AICD, aflutter on eliquis, T2DM, diverticulitis, gout, HTN, HLD, recent hospitalization with asthma exacerbation 2nd coronavirus (1/2018) presents to [facility] 3/7 with c/o acute epigastric pain/indigestion that radiated to both arms. ?NSTEMI and transferred to [facility] on 3/8 for cardiac cath. Cardiac cath with normal coronaries, LVedp: 17. Additionally patient was being treated at [facility] for asthma exacerbation 2nd ?URI with Prednisone 20mg qd, Azithromycin 250mg qd. Documentation from [place]: diffuse wheezing on exam with normal CXR. Patient denies CP, SOB to me. +cough with yellow phlegm. +sick contact-daughter. Exam with forced exp wheeze L, +erythema posterior pharynx. 98% on RA    Outpt pulm: Dr. Kirill Daily ([place])
71 y/o F with PMH asthma with frequent exacerbation (on oral steroids >5x per year), respiratory failure 2016 in the setting of Flu/PNA, HFrEF (mild LV dysfxn 2017), s/p AICD, aflutter on eliquis, T2DM, diverticulitis, gout, HTN, HLD, recent hospitalization with asthma exacerbation 2nd coronavirus (1/2018) presents to [facility] 3/7 with c/o acute epigastric pain/indigestion that radiated to both arms. ?NSTEMI and transferred to [facility] on 3/8 for cardiac cath. Cardiac cath with normal coronaries, LVedp: 17. Additionally patient was being treated at [facility] for asthma exacerbation 2nd ?URI with Prednisone 20mg qd, Azithromycin 250mg qd. Documentation from [place]: diffuse wheezing on exam with normal CXR. Patient denies CP, SOB to me. +cough with yellow phlegm. +sick contact-daughter. Exam with forced exp wheeze L, +erythema posterior pharynx. 98% on RA    Outpt pulm: Dr. Kirill Daily ([place])
71 y/o F with PMH asthma with frequent exacerbation (on oral steroids >5x per year), respiratory failure 2016 in the setting of Flu/PNA, HFrEF (mild LV dysfxn 2017), s/p AICD, aflutter on eliquis, T2DM, diverticulitis, gout, HTN, HLD, recent hospitalization with asthma exacerbation 2nd coronavirus (1/2018) presents to [facility] 3/7 with c/o acute epigastric pain/indigestion that radiated to both arms. ?NSTEMI and transferred to [facility] on 3/8 for cardiac cath. Cardiac cath with normal coronaries, LVedp: 17. Additionally patient was being treated at [facility] for asthma exacerbation 2nd ?URI with Prednisone 20mg qd, Azithromycin 250mg qd. Documentation from [place]: diffuse wheezing on exam with normal CXR. Patient denies CP, SOB to me. +cough with yellow phlegm. +sick contact-daughter. Exam with forced exp wheeze L, +erythema posterior pharynx. 98% on RA    Outpt pulm: Dr. Kirill Daily ([place])
71 y/o F with PMH asthma with frequent exacerbation (on oral steroids >5x per year), respiratory failure 2016 in the setting of Flu/PNA, HFrEF (mild LV dysfxn 2017), s/p AICD, aflutter on eliquis, T2DM, diverticulitis, gout, HTN, HLD, recent hospitalization with asthma exacerbation 2nd coronavirus (1/2018) presents to [facility] 3/7 with c/o acute epigastric pain/indigestion that radiated to both arms. ?NSTEMI and transferred to [facility] on 3/8 for cardiac cath. Cardiac cath with normal coronaries, LVedp: 17. Additionally patient was being treated at [facility] for asthma exacerbation 2nd ?URI with Prednisone 20mg qd, Azithromycin 250mg qd. Documentation from [place]: diffuse wheezing on exam with normal CXR. Patient denies CP, SOB to me. +cough with yellow phlegm. +sick contact-daughter. Exam with forced exp wheeze L, +erythema posterior pharynx. 98% on RA    Outpt pulm: Dr. Kirill Daily ([place])
72 year old female with PMH of Asthma with frequent exacerbation, systolic CHF s/p AICD, Aflutter on eliquis, DM-2, diverticulitis, gout, HTN, HLD, COPD; transferred from [facility] with epigastric pain radiating to arms with elevated troponins at OSH now s/p cardiac cath at [facility] on 3/8/18 with clear coronaries; admitted for possible acute on chronic systolic heart failure and right sided chest pain.
72 year old female with PMH of Asthma with frequent exacerbation, systolic CHF s/p AICD, Aflutter on eliquis, DM-2, diverticulitis, gout, HTN, HLD, COPD; transferred from [facility] with epigastric pain radiating to arms with elevated troponins at OSH now s/p cardiac cath at [facility] on 3/8/18 with clear coronaries; admitted for possible acute on chronic systolic heart failure and right sided chest pain.
72 year old female with PMH of Asthma with frequent exacerbation, systolic CHF s/p AICD, Aflutter on eliquis, DM-2, diverticulitis, gout, HTN, HLD, COPD; transferred from [facility] with epigastric pain radiating to arms with elevated troponins at OSH now s/p cardiac cath at [facility] on 3/8/18 with clear coronaries; admitted for possible acute on chronic systolic heart failure and right sided chest pain.
72 year old female with PMH of Asthma with frequent exacerbation, systolic CHF s/p AICD, Aflutter on eliquis, DM-2, diverticulitis, gout, HTN, HLD, COPD; transferred from [facility] with epigastric pain radiating to arms with elevated troponins at OSH now s/p cardiac cath at [facility] on 3/8/18 with clear coronaries; admitted for possible acute on chronic systolic heart failure and right sided chest pain.
73 yo Afro American female with PMHx of Asthma with frequent exacerbation, systolic CHF, s/p AICD, T2DM, diverticulitis, gout, HTN, HLD, COPD s/p cardiac cath for elevated troponin admitted for acute on chronic systolic heart failure
72 year old female with PMH of Asthma with frequent exacerbation, systolic CHF s/p AICD, Aflutter on eliquis, DM-2, diverticulitis, gout, HTN, HLD, COPD; transferred from [facility] with epigastric pain radiating to arms with elevated troponins at OSH now s/p cardiac cath at [facility] on 3/8/18 with clear coronaries; admitted for possible acute on chronic systolic heart failure and right sided chest pain.

## 2018-03-13 NOTE — PROGRESS NOTE ADULT - PROBLEM SELECTOR PLAN 3
-Will resume apixaban.   -C/w telemetry.  -C/w ASA daily.  -Patient not on BB, possibly due to asthma history.
monitor while on steroids
-Right-sided chest wall pain under right breast, somewhat reproducible on exam. Now resolved.   Possibly MSK pain vs. asthma exacerbation.   -Cardiac cath with clear coronaries, making cardiac cause less likely.   -Low suspicion for PE. Pulm f/u  -Tylenol as needed for pain.
-Will resume apixaban.   -C/w telemetry.  -C/w ASA daily.  -Patient not on BB, possibly due to asthma history.
-Will resume apixaban.   -C/w telemetry.  -C/w ASA daily.  -Patient not on BB, possibly due to asthma history.
monitor while on steroids
monitor while on steroids
-CHF f/u. previous note ?consider cardiomems. LE edema on exam - will give lasix 20mg IV x 1, increase lasix oral to 40mg bid for now  bnp 3/9 was 120   spironolactone d/jeffry due to elevated K - increasing lasix at this time - may need to restart spironolactone - perhaps at lower dose  -C/w enalapril 10mg twice daily.  -Echo reviewed as above  -Due for ICD interrogation as outpatient.   continue telemetry with increased lasix today.  -Monitor I's/O's.
c/w spirvia, albuterol, singulair
monitor while on steroids

## 2018-03-13 NOTE — PROGRESS NOTE ADULT - PROBLEM SELECTOR PLAN 1
keep o2 sat >=90% w/ prn o2  likely URI  prednisone 20 mg po daily x 5 days   zithromax 500mg po x 1 then 250 mg po daily x5days  c/w symbicort, spiriva, singular, duonebs q6h
-Had a recent admission with coronavirus positive with asthma worsening.   -Z-pack and prednisone as per pulm. sputum cx with pseudomonas - discussed with Dr. Lamb - pseudomonas likely not active infection - ID consult called.   -C/w symbicort.
-Right-sided chest wall pain under right breast, somewhat reproducible on exam. Possibly MSK pain vs. pulmonary.   -Cardiac cath with clear coronaries, making cardiac cause less likely.   -Low suspicion for PE.   -Tylenol as needed for pain.   -Will get CT chest C- to look for underlying PNA or other cause of pain.
-Right-sided chest wall pain under right breast, somewhat reproducible on exam. Possibly MSK pain vs. pulmonary. Now resolved  -Cardiac cath with clear coronaries, making cardiac cause less likely.   -Low suspicion for PE.   -Tylenol as needed for pain.
keep o2 sat >=90% w/ prn o2  likely URI  prednisone 20 mg po daily x 5 days   zithromax 500mg po x 1 then 250 mg po daily x5days  c/w symbicort, spiriva, singular, duonebs q6h
keep o2 sat >=90% w/ prn o2  likely URI  prednisone 20 mg po daily x 5 days   zithromax 500mg po x 1 then 250 mg po daily x5days  c/w symbicort, spiriva, singular, duonebs q6h
keep o2 sat >=90% w/ prn o2  likely URI  resume: prednisone 20 mg po daily x 5 days  begin zithromax 500mg po x 1 then 250 mg po daily  c/w symbicort, spiriva, singular, duonebs q6h
-Right-sided chest wall pain under right breast, somewhat reproducible on exam. Now resolved.   Possibly MSK pain vs. asthma exacerbation.   -Cardiac cath with clear coronaries, making cardiac cause less likely.   -Low suspicion for PE. Pulm f/u  -Tylenol as needed for pain.
c/w lasix 40mg iv daily  tele monitoring  monitor ins and outs  c/w spirinolactone 25mg daily  c/w enalapril 10mg bid  heart failure consult
-Right-sided chest wall pain under right breast, somewhat reproducible on exam. Possibly MSK pain vs. pulmonary. Now resolevd  -Cardiac cath with clear coronaries, making cardiac cause less likely.   -Low suspicion for PE.   -Tylenol as needed for pain.   -Will get CT chest C- to look for underlying PNA or other cause of pain., completed, await official read

## 2018-03-13 NOTE — PROGRESS NOTE ADULT - SUBJECTIVE AND OBJECTIVE BOX
Patient is a 72y old  Female who presents with a chief complaint of cardiac cath (09 Mar 2018 19:21)        SUBJECTIVE / OVERNIGHT EVENTS: no acute complaints. still c/o cough       MEDICATIONS  (STANDING):  ALBUTerol/ipratropium for Nebulization 3 milliLiter(s) Nebulizer every 6 hours  apixaban 5 milliGRAM(s) Oral every 12 hours  aspirin enteric coated 81 milliGRAM(s) Oral daily  atorvastatin 20 milliGRAM(s) Oral at bedtime  azithromycin   Tablet 250 milliGRAM(s) Oral daily  buDESOnide 160 MICROgram(s)/formoterol 4.5 MICROgram(s) Inhaler 2 Puff(s) Inhalation two times a day  dextrose 5%. 1000 milliLiter(s) (50 mL/Hr) IV Continuous <Continuous>  dextrose 50% Injectable 12.5 Gram(s) IV Push once  dextrose 50% Injectable 25 Gram(s) IV Push once  dextrose 50% Injectable 25 Gram(s) IV Push once  docusate sodium 100 milliGRAM(s) Oral three times a day  enalapril 10 milliGRAM(s) Oral two times a day  famotidine    Tablet 20 milliGRAM(s) Oral two times a day  fluticasone propionate 50 MICROgram(s)/spray Nasal Spray 1 Spray(s) Both Nostrils two times a day  furosemide    Tablet 40 milliGRAM(s) Oral two times a day  guaiFENesin  milliGRAM(s) Oral every 12 hours  insulin glargine Injectable (LANTUS) 6 Unit(s) SubCutaneous at bedtime  insulin lispro (HumaLOG) corrective regimen sliding scale   SubCutaneous three times a day before meals  insulin lispro (HumaLOG) corrective regimen sliding scale   SubCutaneous at bedtime  loratadine 10 milliGRAM(s) Oral daily  magnesium oxide 400 milliGRAM(s) Oral three times a day with meals  montelukast 10 milliGRAM(s) Oral daily  polyethylene glycol 3350 17 Gram(s) Oral daily  potassium chloride   Solution 20 milliEquivalent(s) Oral daily  predniSONE   Tablet 20 milliGRAM(s) Oral daily  senna 2 Tablet(s) Oral at bedtime  sodium chloride 0.9% lock flush 3 milliLiter(s) IV Push every 8 hours  tiotropium 18 MICROgram(s) Capsule 1 Capsule(s) Inhalation daily    MEDICATIONS  (PRN):  acetaminophen   Tablet. 650 milliGRAM(s) Oral every 6 hours PRN Moderate Pain (4 - 6)  dextrose Gel 1 Dose(s) Oral once PRN Blood Glucose LESS THAN 70 milliGRAM(s)/deciliter  glucagon  Injectable 1 milliGRAM(s) IntraMuscular once PRN Glucose LESS THAN 70 milligrams/deciliter  guaiFENesin   Syrup  (Sugar-Free) 200 milliGRAM(s) Oral every 6 hours PRN Cough  sodium chloride 0.65% Nasal 1 Spray(s) Both Nostrils every 2 hours PRN Nasal Congestion      Vital Signs Last 24 Hrs  T(C): 36.6 (13 Mar 2018 04:26), Max: 36.8 (12 Mar 2018 13:15)  T(F): 97.8 (13 Mar 2018 04:26), Max: 98.3 (12 Mar 2018 13:15)  HR: 90 (13 Mar 2018 04:26) (86 - 97)  BP: 126/72 (13 Mar 2018 04:26) (104/70 - 126/72)  BP(mean): --  RR: 17 (13 Mar 2018 04:26) (17 - 18)  SpO2: 96% (13 Mar 2018 04:26) (96% - 99%)  CAPILLARY BLOOD GLUCOSE      POCT Blood Glucose.: 173 mg/dL (13 Mar 2018 09:15)  POCT Blood Glucose.: 192 mg/dL (12 Mar 2018 22:01)  POCT Blood Glucose.: 169 mg/dL (12 Mar 2018 18:01)  POCT Blood Glucose.: 279 mg/dL (12 Mar 2018 13:02)    I&O's Summary    12 Mar 2018 07:01  -  13 Mar 2018 07:00  --------------------------------------------------------  IN: 480 mL / OUT: 750 mL / NET: -270 mL        PHYSICAL EXAM:  GENERAL: NAD, no cough during interview and exam  HEAD:  Atraumatic, Normocephalic  EYES: conjunctiva and sclera clear  NECK: No JVD  CHEST/LUNG: CTA b/l  HEART: S1 S2 RRR  ABDOMEN: +BS Soft, NT/ND  EXTREMITIES:  2+ DP Pulses, No c/c. 1+ b/l LE edema improved from yesturday  NEUROLOGY: AAOx3, no focal deficits   SKIN: No rashes or lesions    LABS:    03-13    136  |  97  |  48<H>  ----------------------------<  182<H>  4.6   |  27  |  1.26    Ca    9.4      13 Mar 2018 06:23  Mg     2.2     03-13            sputum culture - pseudomonas    RADIOLOGY & ADDITIONAL TESTS:    Imaging Personally Reviewed:   Consultant(s) Notes Reviewed:    Care Discussed with Consultants/Other Providers: d/w HF NP - Bethanie who spoke with Dr. Goodman. HF team was not able to see patient yesturday and will not be able to see patient today but feels that it is okay to discharge patient on Lasix 40mg bid and follow up with Dr. Hobson outpatient.

## 2018-03-13 NOTE — CONSULT NOTE ADULT - ATTENDING COMMENTS
72 F with PMHx of Asthma with frequent exacerbation, systolic CHF, s/p AICD, aflutter on Eliquis, DM2, diverticulitis, gout, HTN, HLD, COPD, hx of respiratory failure and trach who was transferred from [facility] on 3/7 for + trops. 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 However--no fever, no worsening cough, no worsening SOB, no leukocytosis, CT chest overall reassuring (collapsed lung--chronic). Doubt active pneumonia. Pseudomonas likely colonizer. Would monitor for now. Continue azithromycin for bronchitis per pulmonary. Overall, positive culture finding, pseudomonas, cough.  - Azithromycin per pulmonary for bronchitis  - Would not add further antibiotics presently  - Monitor for signs active infection    José Miguel Cabral MD  Pager [phone]  After 5pm and on weekends call [phone]

## 2018-03-22 ENCOUNTER — APPOINTMENT (OUTPATIENT)
Dept: CARDIOLOGY | Facility: CLINIC | Age: 73
End: 2018-03-22

## 2018-03-27 ENCOUNTER — APPOINTMENT (OUTPATIENT)
Dept: ELECTROPHYSIOLOGY | Facility: CLINIC | Age: 73
End: 2018-03-27
Payer: MEDICARE

## 2018-03-29 ENCOUNTER — APPOINTMENT (OUTPATIENT)
Dept: OTOLARYNGOLOGY | Facility: CLINIC | Age: 73
End: 2018-03-29

## 2018-03-30 ENCOUNTER — APPOINTMENT (OUTPATIENT)
Dept: CARDIOLOGY | Facility: CLINIC | Age: 73
End: 2018-03-30
Payer: MEDICARE

## 2018-03-30 VITALS
HEIGHT: 62 IN | WEIGHT: 190 LBS | HEART RATE: 88 BPM | BODY MASS INDEX: 34.96 KG/M2 | DIASTOLIC BLOOD PRESSURE: 70 MMHG | SYSTOLIC BLOOD PRESSURE: 125 MMHG | RESPIRATION RATE: 16 BRPM | OXYGEN SATURATION: 96 %

## 2018-03-30 DIAGNOSIS — I50.30 UNSPECIFIED DIASTOLIC (CONGESTIVE) HEART FAILURE: ICD-10-CM

## 2018-03-30 DIAGNOSIS — Z86.79 PERSONAL HISTORY OF OTHER DISEASES OF THE CIRCULATORY SYSTEM: ICD-10-CM

## 2018-03-30 DIAGNOSIS — I50.43 ACUTE ON CHRONIC COMBINED SYSTOLIC (CONGESTIVE) AND DIASTOLIC (CONGESTIVE) HEART FAILURE: ICD-10-CM

## 2018-03-30 PROCEDURE — 99214 OFFICE O/P EST MOD 30 MIN: CPT

## 2018-04-04 ENCOUNTER — EMERGENCY (EMERGENCY)
Facility: HOSPITAL | Age: 73
LOS: 1 days | Discharge: HOME | End: 2018-04-04
Attending: EMERGENCY MEDICINE | Admitting: EMERGENCY MEDICINE
Payer: MEDICARE

## 2018-04-04 VITALS
SYSTOLIC BLOOD PRESSURE: 124 MMHG | TEMPERATURE: 98 F | DIASTOLIC BLOOD PRESSURE: 85 MMHG | OXYGEN SATURATION: 97 % | RESPIRATION RATE: 18 BRPM | HEART RATE: 89 BPM | WEIGHT: 184.97 LBS | HEIGHT: 62 IN

## 2018-04-04 PROCEDURE — 99284 EMERGENCY DEPT VISIT MOD MDM: CPT | Mod: 25

## 2018-04-04 NOTE — ED ADULT NURSE NOTE - CHPI ED SYMPTOMS NEG
no chills/no back pain/no abdominal pain/no dysuria/no fever/no vomiting/no nausea/no pain/no discharge

## 2018-04-04 NOTE — ED ADULT NURSE NOTE - OBJECTIVE STATEMENT
73 year old female came into the ER via walkin with c/o vaginal bleeding x 1 day. Pt states she noticed a mild amount of blood on her pad, not soaking through the pad. Pt is on eliquis. No blood in urine or stool, no burning upon urination. No dizziness/lightheaded, HA, N/V/D, CP, SOB, fever, chills, abd pain at this time. Family at bedside. Comfort and safety maintained.

## 2018-04-05 ENCOUNTER — RESULT REVIEW (OUTPATIENT)
Age: 73
End: 2018-04-05

## 2018-04-05 VITALS
DIASTOLIC BLOOD PRESSURE: 77 MMHG | RESPIRATION RATE: 16 BRPM | OXYGEN SATURATION: 98 % | TEMPERATURE: 98 F | HEART RATE: 88 BPM | SYSTOLIC BLOOD PRESSURE: 114 MMHG

## 2018-04-05 LAB
ALBUMIN SERPL ELPH-MCNC: 3.7 G/DL — SIGNIFICANT CHANGE UP (ref 3.3–5)
ALP SERPL-CCNC: 77 U/L — SIGNIFICANT CHANGE UP (ref 40–120)
ALT FLD-CCNC: 18 U/L RC — SIGNIFICANT CHANGE UP (ref 10–45)
ANION GAP SERPL CALC-SCNC: 11 MMOL/L — SIGNIFICANT CHANGE UP (ref 5–17)
APPEARANCE UR: CLEAR — SIGNIFICANT CHANGE UP
APTT BLD: 35.7 SEC — SIGNIFICANT CHANGE UP (ref 27.5–37.4)
AST SERPL-CCNC: 20 U/L — SIGNIFICANT CHANGE UP (ref 10–40)
BASOPHILS # BLD AUTO: 0 K/UL — SIGNIFICANT CHANGE UP (ref 0–0.2)
BASOPHILS NFR BLD AUTO: 0.4 % — SIGNIFICANT CHANGE UP (ref 0–2)
BILIRUB SERPL-MCNC: 0.4 MG/DL — SIGNIFICANT CHANGE UP (ref 0.2–1.2)
BILIRUB UR-MCNC: NEGATIVE — SIGNIFICANT CHANGE UP
BUN SERPL-MCNC: 27 MG/DL — HIGH (ref 7–23)
CALCIUM SERPL-MCNC: 9.5 MG/DL — SIGNIFICANT CHANGE UP (ref 8.4–10.5)
CHLORIDE SERPL-SCNC: 104 MMOL/L — SIGNIFICANT CHANGE UP (ref 96–108)
CO2 SERPL-SCNC: 26 MMOL/L — SIGNIFICANT CHANGE UP (ref 22–31)
COLOR SPEC: YELLOW — SIGNIFICANT CHANGE UP
CREAT SERPL-MCNC: 0.99 MG/DL — SIGNIFICANT CHANGE UP (ref 0.5–1.3)
DIFF PNL FLD: ABNORMAL
EOSINOPHIL # BLD AUTO: 0.1 K/UL — SIGNIFICANT CHANGE UP (ref 0–0.5)
EOSINOPHIL NFR BLD AUTO: 2 % — SIGNIFICANT CHANGE UP (ref 0–6)
EPI CELLS # UR: SIGNIFICANT CHANGE UP /HPF
GLUCOSE SERPL-MCNC: 102 MG/DL — HIGH (ref 70–99)
GLUCOSE UR QL: NEGATIVE — SIGNIFICANT CHANGE UP
HCT VFR BLD CALC: 32.4 % — LOW (ref 34.5–45)
HCT VFR BLD CALC: 34 % — LOW (ref 34.5–45)
HGB BLD-MCNC: 11 G/DL — LOW (ref 11.5–15.5)
HGB BLD-MCNC: 11.4 G/DL — LOW (ref 11.5–15.5)
INR BLD: 1.29 RATIO — HIGH (ref 0.88–1.16)
KETONES UR-MCNC: NEGATIVE — SIGNIFICANT CHANGE UP
LEUKOCYTE ESTERASE UR-ACNC: ABNORMAL
LYMPHOCYTES # BLD AUTO: 2.1 K/UL — SIGNIFICANT CHANGE UP (ref 1–3.3)
LYMPHOCYTES # BLD AUTO: 29 % — SIGNIFICANT CHANGE UP (ref 13–44)
MCHC RBC-ENTMCNC: 33.5 GM/DL — SIGNIFICANT CHANGE UP (ref 32–36)
MCHC RBC-ENTMCNC: 33.5 PG — SIGNIFICANT CHANGE UP (ref 27–34)
MCHC RBC-ENTMCNC: 34 GM/DL — SIGNIFICANT CHANGE UP (ref 32–36)
MCHC RBC-ENTMCNC: 34 PG — SIGNIFICANT CHANGE UP (ref 27–34)
MCV RBC AUTO: 100 FL — SIGNIFICANT CHANGE UP (ref 80–100)
MCV RBC AUTO: 100 FL — SIGNIFICANT CHANGE UP (ref 80–100)
MONOCYTES # BLD AUTO: 0.6 K/UL — SIGNIFICANT CHANGE UP (ref 0–0.9)
MONOCYTES NFR BLD AUTO: 7.7 % — SIGNIFICANT CHANGE UP (ref 2–14)
NEUTROPHILS # BLD AUTO: 4.4 K/UL — SIGNIFICANT CHANGE UP (ref 1.8–7.4)
NEUTROPHILS NFR BLD AUTO: 60.9 % — SIGNIFICANT CHANGE UP (ref 43–77)
NITRITE UR-MCNC: NEGATIVE — SIGNIFICANT CHANGE UP
PH UR: 7.5 — SIGNIFICANT CHANGE UP (ref 5–8)
PLATELET # BLD AUTO: 162 K/UL — SIGNIFICANT CHANGE UP (ref 150–400)
PLATELET # BLD AUTO: 165 K/UL — SIGNIFICANT CHANGE UP (ref 150–400)
POTASSIUM SERPL-MCNC: 4.5 MMOL/L — SIGNIFICANT CHANGE UP (ref 3.5–5.3)
POTASSIUM SERPL-SCNC: 4.5 MMOL/L — SIGNIFICANT CHANGE UP (ref 3.5–5.3)
PROT SERPL-MCNC: 6.9 G/DL — SIGNIFICANT CHANGE UP (ref 6–8.3)
PROT UR-MCNC: NEGATIVE — SIGNIFICANT CHANGE UP
PROTHROM AB SERPL-ACNC: 14.1 SEC — HIGH (ref 9.8–12.7)
RBC # BLD: 3.24 M/UL — LOW (ref 3.8–5.2)
RBC # BLD: 3.39 M/UL — LOW (ref 3.8–5.2)
RBC # FLD: 12.3 % — SIGNIFICANT CHANGE UP (ref 10.3–14.5)
RBC # FLD: 12.5 % — SIGNIFICANT CHANGE UP (ref 10.3–14.5)
RBC CASTS # UR COMP ASSIST: ABNORMAL /HPF (ref 0–2)
SODIUM SERPL-SCNC: 141 MMOL/L — SIGNIFICANT CHANGE UP (ref 135–145)
SP GR SPEC: 1.02 — SIGNIFICANT CHANGE UP (ref 1.01–1.02)
UROBILINOGEN FLD QL: NEGATIVE — SIGNIFICANT CHANGE UP
WBC # BLD: 7.2 K/UL — SIGNIFICANT CHANGE UP (ref 3.8–10.5)
WBC # BLD: 7.8 K/UL — SIGNIFICANT CHANGE UP (ref 3.8–10.5)
WBC # FLD AUTO: 7.2 K/UL — SIGNIFICANT CHANGE UP (ref 3.8–10.5)
WBC # FLD AUTO: 7.8 K/UL — SIGNIFICANT CHANGE UP (ref 3.8–10.5)
WBC UR QL: SIGNIFICANT CHANGE UP /HPF (ref 0–5)

## 2018-04-05 PROCEDURE — 81001 URINALYSIS AUTO W/SCOPE: CPT

## 2018-04-05 PROCEDURE — 85610 PROTHROMBIN TIME: CPT

## 2018-04-05 PROCEDURE — 80053 COMPREHEN METABOLIC PANEL: CPT

## 2018-04-05 PROCEDURE — 85027 COMPLETE CBC AUTOMATED: CPT

## 2018-04-05 PROCEDURE — 85730 THROMBOPLASTIN TIME PARTIAL: CPT

## 2018-04-05 PROCEDURE — 82272 OCCULT BLD FECES 1-3 TESTS: CPT

## 2018-04-05 PROCEDURE — 87086 URINE CULTURE/COLONY COUNT: CPT

## 2018-04-05 PROCEDURE — 99283 EMERGENCY DEPT VISIT LOW MDM: CPT

## 2018-04-05 PROCEDURE — 87186 SC STD MICRODIL/AGAR DIL: CPT

## 2018-04-05 RX ORDER — CIPROFLOXACIN LACTATE 400MG/40ML
1 VIAL (ML) INTRAVENOUS
Qty: 6 | Refills: 0 | OUTPATIENT
Start: 2018-04-05 | End: 2018-04-07

## 2018-04-05 RX ORDER — CIPROFLOXACIN LACTATE 400MG/40ML
500 VIAL (ML) INTRAVENOUS EVERY 12 HOURS
Qty: 0 | Refills: 0 | Status: DISCONTINUED | OUTPATIENT
Start: 2018-04-05 | End: 2018-04-08

## 2018-04-05 RX ADMIN — Medication 500 MILLIGRAM(S): at 04:02

## 2018-04-05 NOTE — ED PROVIDER NOTE - PROGRESS NOTE DETAILS
Attending Kaz: pt appears comfortable. UA shows evidence of infection. with hematuria consider infection as cause. reviewed old sensitiives, pt has grown bacteria with sig resistant but sensitive to cipro. repeat culture sent. will start abx. will have pt follow up with GYN for ultrasound as pt with vag bleeding Attending Mccurdy: pt aware of results. will start abx. on repeat exam abd soft. will follow up with GYN for u/s if hematuria persist or develops vaginal bleeding. no worsening bleeding while in the ed. given bleeding precautions

## 2018-04-05 NOTE — ED PROVIDER NOTE - PLAN OF CARE
1) Please follow-up with a gynecologist to have a transvaginal ultrasound done. If you do not have your own gynecologist feel free to call our OBGYN clinic reachable at [phone].  If you cannot follow-up with your doctor(s), please return to the ED for any urgent issues.  2) If you have any worsening of symptoms or any other concerns please return to the ED immediately.  3) Please continue taking your home medications as directed.  4) A prescription has been sent to your pharmacy. Please take it as directed.

## 2018-04-05 NOTE — ED PROVIDER NOTE - OBJECTIVE STATEMENT
73F pmhx afib on eliquis, chf, htn, dm, asthma, here after noticing blood in the toilet tonight while urinating. Per pt, it was enough to "dirty" one pad. She also noticed the bleeding was assoc w/ crampy suprapubic pain. Per pt the bleeding has since stopped. She has been having normal, non-bloody BMs. Pt was recently hospitalized due to concern of CAD but her cath (done here) was negative for blockage. No recent illnesses.

## 2018-04-05 NOTE — ED PROVIDER NOTE - CARE PLAN
Principal Discharge DX:	Urinary tract infection  Assessment and plan of treatment:	1) Please follow-up with a gynecologist to have a transvaginal ultrasound done. If you do not have your own gynecologist feel free to call our OBGYN clinic reachable at [phone].  If you cannot follow-up with your doctor(s), please return to the ED for any urgent issues.  2) If you have any worsening of symptoms or any other concerns please return to the ED immediately.  3) Please continue taking your home medications as directed.  4) A prescription has been sent to your pharmacy. Please take it as directed.

## 2018-04-05 NOTE — ED PROVIDER NOTE - MEDICAL DECISION MAKING DETAILS
73F anticoagulated on eliquis w/ report of vaginal bleeding x1 tonight while urinating. Exam non-focal, no evidence of bruising or petechiae anywhere. Will obtain cbc, cmp, coags, ua/ uc, will check vagina for blood. 73F anticoagulated on eliquis w/ report of vaginal bleeding x1 tonight while urinating. Exam non-focal, no evidence of bruising or petechiae anywhere. Will obtain cbc, cmp, coags, ua/ uc, will check vagina for blood.  Attending Kaz: 74 y/o female persenting with small amount of blood in her underwear and with urination. no abdominal pain currently but did say she had some mild cramping. no fevers or chills. currently pain free. on resident exam rectal brown stool guiac negative. no h/o endometrial cancer or GYN disease that pt is aware of. will obtain ua, labs, INR. pt is on blood thinners and re-eval. as abd soft and nontender unlikely acute surgical process.

## 2018-04-05 NOTE — ED PROVIDER NOTE - PHYSICAL EXAMINATION
Attending Mccurdy: Gen: NAD, heent: atrauamtic, eomi, perrla, mmm, op pink, uvula midline, neck; nttp, no nuchal rigidity, chest: nttp, no crepitus, cv: rrr, no murmurs, lungs: ctab, abd: soft, nontender, nondistended, no peritoneal signs, +BS, no guarding, ext: wwp, neg homans 1+LE pitting edema, skin: no rash, neuro: awake and alert, following commands, speech clear, sensation and strength intact, no focal deficits

## 2018-04-26 ENCOUNTER — APPOINTMENT (OUTPATIENT)
Dept: OTOLARYNGOLOGY | Facility: CLINIC | Age: 73
End: 2018-04-26

## 2018-05-16 ENCOUNTER — EMERGENCY (EMERGENCY)
Facility: HOSPITAL | Age: 73
LOS: 1 days | Discharge: HOME | End: 2018-05-16
Attending: EMERGENCY MEDICINE
Payer: MEDICARE

## 2018-05-16 VITALS
DIASTOLIC BLOOD PRESSURE: 77 MMHG | SYSTOLIC BLOOD PRESSURE: 131 MMHG | RESPIRATION RATE: 20 BRPM | OXYGEN SATURATION: 96 % | HEART RATE: 103 BPM | TEMPERATURE: 98 F

## 2018-05-16 PROCEDURE — 99284 EMERGENCY DEPT VISIT MOD MDM: CPT | Mod: 25

## 2018-05-17 VITALS
DIASTOLIC BLOOD PRESSURE: 73 MMHG | SYSTOLIC BLOOD PRESSURE: 129 MMHG | RESPIRATION RATE: 20 BRPM | OXYGEN SATURATION: 98 % | HEART RATE: 88 BPM | TEMPERATURE: 99 F

## 2018-05-17 LAB
ALBUMIN SERPL ELPH-MCNC: 4 G/DL — SIGNIFICANT CHANGE UP (ref 3.3–5)
ALP SERPL-CCNC: 120 U/L — SIGNIFICANT CHANGE UP (ref 40–120)
ALT FLD-CCNC: 13 U/L — SIGNIFICANT CHANGE UP (ref 10–45)
ANION GAP SERPL CALC-SCNC: 14 MMOL/L — SIGNIFICANT CHANGE UP (ref 5–17)
APPEARANCE UR: CLEAR — SIGNIFICANT CHANGE UP
AST SERPL-CCNC: 12 U/L — SIGNIFICANT CHANGE UP (ref 10–40)
BASOPHILS # BLD AUTO: 0 K/UL — SIGNIFICANT CHANGE UP (ref 0–0.2)
BASOPHILS NFR BLD AUTO: 0.2 % — SIGNIFICANT CHANGE UP (ref 0–2)
BILIRUB SERPL-MCNC: 0.3 MG/DL — SIGNIFICANT CHANGE UP (ref 0.2–1.2)
BILIRUB UR-MCNC: NEGATIVE — SIGNIFICANT CHANGE UP
BUN SERPL-MCNC: 34 MG/DL — HIGH (ref 7–23)
CALCIUM SERPL-MCNC: 9.4 MG/DL — SIGNIFICANT CHANGE UP (ref 8.4–10.5)
CHLORIDE SERPL-SCNC: 98 MMOL/L — SIGNIFICANT CHANGE UP (ref 96–108)
CO2 SERPL-SCNC: 24 MMOL/L — SIGNIFICANT CHANGE UP (ref 22–31)
COLOR SPEC: YELLOW — SIGNIFICANT CHANGE UP
CREAT SERPL-MCNC: 1.22 MG/DL — SIGNIFICANT CHANGE UP (ref 0.5–1.3)
DIFF PNL FLD: NEGATIVE — SIGNIFICANT CHANGE UP
EOSINOPHIL # BLD AUTO: 0.1 K/UL — SIGNIFICANT CHANGE UP (ref 0–0.5)
EOSINOPHIL NFR BLD AUTO: 0.5 % — SIGNIFICANT CHANGE UP (ref 0–6)
GLUCOSE SERPL-MCNC: 371 MG/DL — HIGH (ref 70–99)
GLUCOSE UR QL: 500 MG/DL
HCT VFR BLD CALC: 35.1 % — SIGNIFICANT CHANGE UP (ref 34.5–45)
HGB BLD-MCNC: 11.6 G/DL — SIGNIFICANT CHANGE UP (ref 11.5–15.5)
KETONES UR-MCNC: NEGATIVE — SIGNIFICANT CHANGE UP
LEUKOCYTE ESTERASE UR-ACNC: ABNORMAL
LYMPHOCYTES # BLD AUTO: 1.2 K/UL — SIGNIFICANT CHANGE UP (ref 1–3.3)
LYMPHOCYTES # BLD AUTO: 11 % — LOW (ref 13–44)
MCHC RBC-ENTMCNC: 32.2 PG — SIGNIFICANT CHANGE UP (ref 27–34)
MCHC RBC-ENTMCNC: 33 GM/DL — SIGNIFICANT CHANGE UP (ref 32–36)
MCV RBC AUTO: 97.7 FL — SIGNIFICANT CHANGE UP (ref 80–100)
MONOCYTES # BLD AUTO: 0.7 K/UL — SIGNIFICANT CHANGE UP (ref 0–0.9)
MONOCYTES NFR BLD AUTO: 6.2 % — SIGNIFICANT CHANGE UP (ref 2–14)
NEUTROPHILS # BLD AUTO: 9 K/UL — HIGH (ref 1.8–7.4)
NEUTROPHILS NFR BLD AUTO: 82 % — HIGH (ref 43–77)
NITRITE UR-MCNC: NEGATIVE — SIGNIFICANT CHANGE UP
PH UR: 6 — SIGNIFICANT CHANGE UP (ref 5–8)
PLATELET # BLD AUTO: 162 K/UL — SIGNIFICANT CHANGE UP (ref 150–400)
POTASSIUM SERPL-MCNC: 4.2 MMOL/L — SIGNIFICANT CHANGE UP (ref 3.5–5.3)
POTASSIUM SERPL-SCNC: 4.2 MMOL/L — SIGNIFICANT CHANGE UP (ref 3.5–5.3)
PROT SERPL-MCNC: 7.5 G/DL — SIGNIFICANT CHANGE UP (ref 6–8.3)
PROT UR-MCNC: NEGATIVE — SIGNIFICANT CHANGE UP
RBC # BLD: 3.6 M/UL — LOW (ref 3.8–5.2)
RBC # FLD: 12.2 % — SIGNIFICANT CHANGE UP (ref 10.3–14.5)
SODIUM SERPL-SCNC: 136 MMOL/L — SIGNIFICANT CHANGE UP (ref 135–145)
SP GR SPEC: 1.02 — SIGNIFICANT CHANGE UP (ref 1.01–1.02)
UROBILINOGEN FLD QL: NEGATIVE — SIGNIFICANT CHANGE UP
WBC # BLD: 10.9 K/UL — HIGH (ref 3.8–10.5)
WBC # FLD AUTO: 10.9 K/UL — HIGH (ref 3.8–10.5)

## 2018-05-17 PROCEDURE — 84484 ASSAY OF TROPONIN QUANT: CPT

## 2018-05-17 PROCEDURE — 82962 GLUCOSE BLOOD TEST: CPT

## 2018-05-17 PROCEDURE — 85027 COMPLETE CBC AUTOMATED: CPT

## 2018-05-17 PROCEDURE — 81001 URINALYSIS AUTO W/SCOPE: CPT

## 2018-05-17 PROCEDURE — 87086 URINE CULTURE/COLONY COUNT: CPT

## 2018-05-17 PROCEDURE — 83880 ASSAY OF NATRIURETIC PEPTIDE: CPT

## 2018-05-17 PROCEDURE — 80053 COMPREHEN METABOLIC PANEL: CPT

## 2018-05-17 PROCEDURE — 99283 EMERGENCY DEPT VISIT LOW MDM: CPT | Mod: 25

## 2018-05-17 PROCEDURE — 94640 AIRWAY INHALATION TREATMENT: CPT

## 2018-05-17 RX ORDER — SODIUM CHLORIDE 9 MG/ML
1000 INJECTION INTRAMUSCULAR; INTRAVENOUS; SUBCUTANEOUS ONCE
Qty: 0 | Refills: 0 | Status: DISCONTINUED | OUTPATIENT
Start: 2018-05-17 | End: 2018-05-17

## 2018-05-17 RX ORDER — SODIUM CHLORIDE 9 MG/ML
500 INJECTION INTRAMUSCULAR; INTRAVENOUS; SUBCUTANEOUS ONCE
Qty: 0 | Refills: 0 | Status: COMPLETED | OUTPATIENT
Start: 2018-05-17 | End: 2018-05-17

## 2018-05-17 RX ORDER — IPRATROPIUM/ALBUTEROL SULFATE 18-103MCG
3 AEROSOL WITH ADAPTER (GRAM) INHALATION ONCE
Qty: 0 | Refills: 0 | Status: COMPLETED | OUTPATIENT
Start: 2018-05-17 | End: 2018-05-17

## 2018-05-17 RX ORDER — MECLIZINE HCL 12.5 MG
50 TABLET ORAL ONCE
Qty: 0 | Refills: 0 | Status: COMPLETED | OUTPATIENT
Start: 2018-05-17 | End: 2018-05-17

## 2018-05-17 RX ORDER — MECLIZINE HCL 12.5 MG
1 TABLET ORAL
Qty: 20 | Refills: 0 | OUTPATIENT
Start: 2018-05-17 | End: 2018-05-21

## 2018-05-17 RX ADMIN — SODIUM CHLORIDE 500 MILLILITER(S): 9 INJECTION INTRAMUSCULAR; INTRAVENOUS; SUBCUTANEOUS at 01:16

## 2018-05-17 RX ADMIN — Medication 3 MILLILITER(S): at 03:48

## 2018-05-17 RX ADMIN — Medication 50 MILLIGRAM(S): at 02:43

## 2018-05-17 NOTE — ED ADULT NURSE REASSESSMENT NOTE - NS ED NURSE REASSESS COMMENT FT1
pt ambulated with steady gait. pt denies any lightheadedness, dizziness, chest pain, or SOB at present.

## 2018-05-17 NOTE — ED PROVIDER NOTE - ATTENDING CONTRIBUTION TO CARE
The patient was re-examined after interventions and is feeling much better.  The patient will follow up with their primary physician this week.   No immediate life threatening issues present on history or clinical exam. Patient is a safe disposition home, has capacity and insight into their condition, is ambulatory in the Emergency Department and will follow up with their doctor(s) this week. Patient and family  understand anticipatory guidance were given strict return and follow up precautions.  The patient and family have been informed of all concerning signs and symptoms to return to Emergency Department, the necessity to follow up with the PMD/Clinic/follow up provided within 2-3 days was explained, and the patient and/or family reports understanding of above with capacity and insight.   Patient with temporary bgl spike ? likely secondary to steroids, patient compliant with meds and no indiscretion or infection or infarction. After fluids and treatment for spinning patient improved and without complaint with normal gait   will encourage to  follow up with primary medical doctor and neurology, continue medications

## 2018-05-17 NOTE — ED ADULT NURSE NOTE - TEMPLATE LIST FOR HEAD TO TOE ASSESSMENT
Reason for Visit: cardiovascular follow up.    HPI:  Jose Emerson is a 81 y.o. male is here today for follow-up.  He has been doing relatively well from a cardiac standpoint.  Injured his hand on a table saw about 2 months ago and has pain in finger since that time.  The pain in his finger varies based on how he holds his hand.  Denies any chest pain, palpitations, dizziness, syncope, PND, or orthopnea.  Has some numbness in his thumb on the right.  Blood pressure has been well controlled at home.  He is active and able to do anything he wants without difficulty.  He continues to work at [employer].      Previous Cardiac Testing and Procedures:  - Echo (03/11/2013) EF 65%, grade 1 diastolic dysfunction, mild AI, normal RV size and function  - Holter monitor (04/25/2017) rare ventricular ectopic beats, rare atrial ectopic beats with short runs of nonsustained SVT, no shifting pulse, arrhythmias, or events, no symptoms    Patient Active Problem List   Diagnosis   • BPH (benign prostatic hyperplasia)   • Coronary artery disease involving native coronary artery of native heart without angina pectoris   • Paroxysmal SVT (supraventricular tachycardia)   • Essential hypertension   • Dyslipidemia       Social History   Substance Use Topics   • Smoking status: Former Smoker     Packs/day: 0.25     Types: Cigarettes     Quit date: 4/27/1957   • Smokeless tobacco: Never Used      Comment: smoked 10-15 years, a pack lasted 2 months   • Alcohol use No       Family History   Problem Relation Age of Onset   • Cancer Father    • Heart disease Father        The following portions of the patient's history were reviewed and updated as appropriate: allergies, current medications, past family history, past medical history, past social history, past surgical history and problem list.      Current Outpatient Prescriptions:   •  aspirin 81 MG chewable tablet, Chew 81 mg Daily., Disp: , Rfl:   •  atorvastatin (LIPITOR) 40 MG tablet, Take  "40 mg by mouth Every Night., Disp: , Rfl:   •  HYDROcodone-acetaminophen (NORCO) 7.5-325 MG per tablet, Take 1 tablet by mouth Every 8 (Eight) Hours As Needed., Disp: , Rfl:   •  LORazepam (ATIVAN) 0.5 MG tablet, Take 0.5 mg by mouth Every 8 (Eight) Hours As Needed for Anxiety., Disp: , Rfl:   •  nitroglycerin (NITROSTAT) 0.4 MG SL tablet, Place 1 tablet under the tongue Every 5 (Five) Minutes As Needed for Chest Pain. Take no more than 3 doses in 15 minutes., Disp: 25 tablet, Rfl: 11  •  tamsulosin (FLOMAX) 0.4 MG capsule 24 hr capsule, Take 1 capsule by mouth Every Night., Disp: , Rfl:     Review of Systems   Constitution: Negative for chills and fever.   Cardiovascular: Negative for chest pain and paroxysmal nocturnal dyspnea.   Respiratory: Negative for cough and shortness of breath.    Skin: Negative for rash.   Gastrointestinal: Negative for abdominal pain and heartburn.   Neurological: Negative for dizziness and numbness.       Objective   /60 (BP Location: Left arm, Patient Position: Sitting, Cuff Size: Adult)   Pulse 61   Resp 14   Ht 165.1 cm (65\")   Wt 64.4 kg (142 lb)   SpO2 97%   BMI 23.63 kg/m²   Physical Exam   Constitutional: He is oriented to person, place, and time. He appears well-developed and well-nourished.   HENT:   Head: Normocephalic and atraumatic.   Cardiovascular: Normal rate, regular rhythm and normal heart sounds.    No murmur heard.  Pulmonary/Chest: Effort normal and breath sounds normal.   Musculoskeletal: He exhibits no edema.   Neurological: He is alert and oriented to person, place, and time.   Skin: Skin is warm and dry.   Psychiatric: He has a normal mood and affect.       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 Coronary artery disease involving native coronary artery of native heart without angina pectoris I25.10 414.01   2. Paroxysmal SVT (supraventricular tachycardia) I47.1 427.0   3. Essential hypertension I10 401.9   4. Dyslipidemia E78.5 272.4         Assessment/Plan:  1. CAD:  Remote history of stent placement, > 10 years ago.  Remains asymptomatic.  Continue medical therapy with aspirin, atorvastatin, metoprolol, and SL nitroglycerin.     2. Paroxysmal SVT: Short runs of nonsustained SVT on Holter monitor from 4/2017. No significant palpitations.  Sinus rhythm on EKG today.      3. Hypertension: Blood pressure remains well-controlled on current therapy     4. Dyslipidemia:   Managed on atorvastatin.        General

## 2018-05-17 NOTE — ED ADULT NURSE NOTE - OBJECTIVE STATEMENT
pt states, "I started taking prednisone today after going to my PMD because I have asthma and I have been wheezing. I am diabetic and when I checked my blood sugar at 2300 my blood sugar was 427. I also was sitting in bed tonight and it felt like the things in the room were spinning around me." pt denies any lightheadedness, dizziness, chest pain, SOB, LOC, abd. pain, n/v/d at present.

## 2018-05-17 NOTE — ED PROVIDER NOTE - OBJECTIVE STATEMENT
Tamara Miranda M.D: 73F hx dm htn asthma chf p/w hyperglycemia and dizziness. notes asthma has been acting up over the last few days, saw her doctor prescribed steroids, which she started today. elevated FS since then. also ntoes dizziness feels like the room is spinning only happens when she moves her head. notes she has a history of vertigo and this feels similar. denies any cp abd pain nvcd. does not feel sob at this time, feels like asthma is better. Tamara Miranda M.D: 73F hx dm htn asthma chf p/w hyperglycemia and dizziness. notes asthma has been acting up over the last few days, saw her doctor prescribed steroids, which she started today. elevated FS since then. also ntoes dizziness feels like the room is spinning only happens when she moves her head. notes she has a history of vertigo and this feels similar. denies any cp abd pain nvcd. does not feel sob at this time, feels like asthma is better.  no nausea/vomiting/diarrhea/constipation/shortness of breath/chest pain/fever/chills

## 2018-05-17 NOTE — ED PROVIDER NOTE - PROGRESS NOTE DETAILS
pt complaining of dizziness room spinning;  will give meclizine and reassess pt reassessed. ambulatory and without dizziness. will dc. pt and family at bedside understand need to follow up. pt complaining of dizziness room spinning along with history of vertigo;  will give meclizine and reassess

## 2018-05-17 NOTE — ED PROVIDER NOTE - PHYSICAL EXAMINATION
Tamara Miranda M.D.:   patient awake alert seen lying on stretcher NAD .   LUNGS CTAB no wheeze no crackle.   CARD RRR no m/r/g.    Abdomen soft NT ND no rebound no guarding no CVA tenderness.   EXT WWP no edema no calf tenderness CV 2+DP/PT bilaterally.   neuro A&Ox3 cn 2-12 intact, gross motor and sensosry itnact in all extremities gait normal.    skin warm and dry no rash  HEENT: moist mucous membranes, PERRL, EOMI Tamara Miranda M.D.:   patient awake alert seen lying on stretcher NAD .   LUNGS CTAB no wheeze no crackle.   CARD RRR no m/r/g.    Abdomen soft NT ND no rebound no guarding no CVA tenderness.   EXT WWP no edema no calf tenderness CV 2+DP/PT bilaterally.   neuro A&Ox3 cn 2-12 intact, gross motor and sensosry itnact in all extremities gait normal.    skin warm and dry no rash  HEENT: moist mucous membranes, PERRL, EOMI  cooperative, appropriate   mmm  normal gait unassisted  5/5 str to bilateral UE and LE, 5/5 sensory to bilateral UE and LE   normal finger to nose and heel to shin   no rashes

## 2018-05-17 NOTE — ED PROVIDER NOTE - PMH
Asthma    Atrial flutter    Cardiac Pacemaker    Cardiomyopathy    Congestive Heart Failure    COPD (chronic obstructive pulmonary disease)    Diabetes    Diverticulitis    Gout    HTN - Hypertension    Kidney stone Asthma    Atrial flutter    Cardiac Pacemaker    Cardiomyopathy    Congestive Heart Failure    COPD (chronic obstructive pulmonary disease)    Diabetes    Diverticulitis    Gout    HTN - Hypertension    Kidney stone    Vertigo

## 2018-05-17 NOTE — ED PROVIDER NOTE - MEDICAL DECISION MAKING DETAILS
73F p/w hyperglycemia and dizziness. nonfocal neuro exam, suspect vertigo. pt refusing medication for vertigo upon initial evaluation. hyperglycemia likely related to steroids. no s/s to suggest infectious process. will check basic blood work and if severe electrolyte derangement will eval for DKA. very low suspicion at this time. likely dc home.

## 2018-05-17 NOTE — ED PROVIDER NOTE - PSH
AICD (Automatic Cardioverter/Defibrillator) Present  inserted in Aug, 2008. Due for battery change in 1 month. ( Ret[employer]) . Inserted by Dr Duffy  S/P cholecystectomy

## 2018-05-17 NOTE — ED ADULT NURSE NOTE - PSH
AICD (Automatic Cardioverter/Defibrillator) Present  inserted in Aug, 2008. Due for battery change in 1 month. ( [employer]) . Inserted by Dr Duffy  S/P cholecystectomy

## 2018-05-18 LAB
CULTURE RESULTS: SIGNIFICANT CHANGE UP
SPECIMEN SOURCE: SIGNIFICANT CHANGE UP

## 2018-06-04 ENCOUNTER — APPOINTMENT (OUTPATIENT)
Dept: ELECTROPHYSIOLOGY | Facility: CLINIC | Age: 73
End: 2018-06-04
Payer: MEDICARE

## 2018-06-04 VITALS
HEART RATE: 79 BPM | OXYGEN SATURATION: 97 % | DIASTOLIC BLOOD PRESSURE: 70 MMHG | SYSTOLIC BLOOD PRESSURE: 118 MMHG | BODY MASS INDEX: 34.41 KG/M2 | WEIGHT: 187 LBS | HEIGHT: 62 IN

## 2018-06-04 PROCEDURE — 93284 PRGRMG EVAL IMPLANTABLE DFB: CPT

## 2018-06-04 RX ORDER — FAMOTIDINE 10 MG/1
10 TABLET, FILM COATED ORAL DAILY
Refills: 0 | Status: DISCONTINUED | COMMUNITY
Start: 2017-03-27 | End: 2018-06-04

## 2018-06-08 ENCOUNTER — APPOINTMENT (OUTPATIENT)
Dept: CARDIOLOGY | Facility: CLINIC | Age: 73
End: 2018-06-08
Payer: MEDICARE

## 2018-06-08 VITALS
HEART RATE: 84 BPM | HEIGHT: 62 IN | WEIGHT: 190 LBS | BODY MASS INDEX: 34.96 KG/M2 | DIASTOLIC BLOOD PRESSURE: 68 MMHG | OXYGEN SATURATION: 95 % | SYSTOLIC BLOOD PRESSURE: 112 MMHG

## 2018-06-08 DIAGNOSIS — I50.20 UNSPECIFIED SYSTOLIC (CONGESTIVE) HEART FAILURE: ICD-10-CM

## 2018-06-08 PROCEDURE — 99214 OFFICE O/P EST MOD 30 MIN: CPT

## 2018-06-08 RX ORDER — OMEPRAZOLE 20 MG/1
20 TABLET, DELAYED RELEASE ORAL
Refills: 0 | Status: DISCONTINUED | COMMUNITY
End: 2018-06-08

## 2018-06-08 RX ORDER — APIXABAN 5 MG/1
5 TABLET, FILM COATED ORAL TWICE DAILY
Qty: 60 | Refills: 5 | Status: ACTIVE | COMMUNITY

## 2018-06-08 RX ORDER — CHLORHEXIDINE GLUCONATE 4 %
250 LIQUID (ML) TOPICAL
Refills: 0 | Status: DISCONTINUED | COMMUNITY
Start: 2017-06-09 | End: 2018-06-08

## 2018-07-02 ENCOUNTER — INPATIENT (INPATIENT)
Facility: HOSPITAL | Age: 73
LOS: 2 days | Discharge: HOME | DRG: 202 | End: 2018-07-05
Attending: HOSPITALIST | Admitting: HOSPITALIST
Payer: MEDICARE

## 2018-07-02 VITALS
DIASTOLIC BLOOD PRESSURE: 75 MMHG | TEMPERATURE: 98 F | WEIGHT: 186.07 LBS | SYSTOLIC BLOOD PRESSURE: 131 MMHG | HEIGHT: 62 IN | HEART RATE: 79 BPM | RESPIRATION RATE: 18 BRPM | OXYGEN SATURATION: 97 %

## 2018-07-02 DIAGNOSIS — I50.22 CHRONIC SYSTOLIC (CONGESTIVE) HEART FAILURE: ICD-10-CM

## 2018-07-02 DIAGNOSIS — E11.9 TYPE 2 DIABETES MELLITUS WITHOUT COMPLICATIONS: ICD-10-CM

## 2018-07-02 DIAGNOSIS — I10 ESSENTIAL (PRIMARY) HYPERTENSION: ICD-10-CM

## 2018-07-02 DIAGNOSIS — J45.901 UNSPECIFIED ASTHMA WITH (ACUTE) EXACERBATION: ICD-10-CM

## 2018-07-02 DIAGNOSIS — I48.92 UNSPECIFIED ATRIAL FLUTTER: ICD-10-CM

## 2018-07-02 DIAGNOSIS — Z29.9 ENCOUNTER FOR PROPHYLACTIC MEASURES, UNSPECIFIED: ICD-10-CM

## 2018-07-02 DIAGNOSIS — J44.9 CHRONIC OBSTRUCTIVE PULMONARY DISEASE, UNSPECIFIED: ICD-10-CM

## 2018-07-02 LAB
ALBUMIN SERPL ELPH-MCNC: 3.8 G/DL — SIGNIFICANT CHANGE UP (ref 3.3–5)
ALP SERPL-CCNC: 106 U/L — SIGNIFICANT CHANGE UP (ref 40–120)
ALT FLD-CCNC: 14 U/L — SIGNIFICANT CHANGE UP (ref 10–45)
ANION GAP SERPL CALC-SCNC: 12 MMOL/L — SIGNIFICANT CHANGE UP (ref 5–17)
APPEARANCE UR: ABNORMAL
APTT BLD: 31.9 SEC — SIGNIFICANT CHANGE UP (ref 27.5–37.4)
AST SERPL-CCNC: 15 U/L — SIGNIFICANT CHANGE UP (ref 10–40)
BACTERIA # UR AUTO: ABNORMAL /HPF
BASE EXCESS BLDV CALC-SCNC: 3 MMOL/L — HIGH (ref -2–2)
BASOPHILS # BLD AUTO: 0 K/UL — SIGNIFICANT CHANGE UP (ref 0–0.2)
BASOPHILS NFR BLD AUTO: 0 % — SIGNIFICANT CHANGE UP (ref 0–2)
BILIRUB SERPL-MCNC: 0.4 MG/DL — SIGNIFICANT CHANGE UP (ref 0.2–1.2)
BILIRUB UR-MCNC: NEGATIVE — SIGNIFICANT CHANGE UP
BUN SERPL-MCNC: 23 MG/DL — SIGNIFICANT CHANGE UP (ref 7–23)
CA-I SERPL-SCNC: 1.2 MMOL/L — SIGNIFICANT CHANGE UP (ref 1.12–1.3)
CALCIUM SERPL-MCNC: 9.4 MG/DL — SIGNIFICANT CHANGE UP (ref 8.4–10.5)
CHLORIDE BLDV-SCNC: 103 MMOL/L — SIGNIFICANT CHANGE UP (ref 96–108)
CHLORIDE SERPL-SCNC: 100 MMOL/L — SIGNIFICANT CHANGE UP (ref 96–108)
CK MB BLD-MCNC: 3.1 % — SIGNIFICANT CHANGE UP (ref 0–3.5)
CK MB CFR SERPL CALC: 4 NG/ML — HIGH (ref 0–3.8)
CK SERPL-CCNC: 128 U/L — SIGNIFICANT CHANGE UP (ref 25–170)
CO2 BLDV-SCNC: 30 MMOL/L — SIGNIFICANT CHANGE UP (ref 22–30)
CO2 SERPL-SCNC: 27 MMOL/L — SIGNIFICANT CHANGE UP (ref 22–31)
COLOR SPEC: YELLOW — SIGNIFICANT CHANGE UP
CREAT SERPL-MCNC: 1.18 MG/DL — SIGNIFICANT CHANGE UP (ref 0.5–1.3)
DIFF PNL FLD: ABNORMAL
EOSINOPHIL # BLD AUTO: 0.1 K/UL — SIGNIFICANT CHANGE UP (ref 0–0.5)
EOSINOPHIL NFR BLD AUTO: 1.1 % — SIGNIFICANT CHANGE UP (ref 0–6)
EPI CELLS # UR: SIGNIFICANT CHANGE UP /HPF
GAS PNL BLDV: 139 MMOL/L — SIGNIFICANT CHANGE UP (ref 136–145)
GAS PNL BLDV: SIGNIFICANT CHANGE UP
GAS PNL BLDV: SIGNIFICANT CHANGE UP
GLUCOSE BLDC GLUCOMTR-MCNC: 130 MG/DL — HIGH (ref 70–99)
GLUCOSE BLDV-MCNC: 104 MG/DL — HIGH (ref 70–99)
GLUCOSE SERPL-MCNC: 113 MG/DL — HIGH (ref 70–99)
GLUCOSE UR QL: NEGATIVE — SIGNIFICANT CHANGE UP
HCO3 BLDV-SCNC: 29 MMOL/L — SIGNIFICANT CHANGE UP (ref 21–29)
HCT VFR BLD CALC: 34.7 % — SIGNIFICANT CHANGE UP (ref 34.5–45)
HCT VFR BLDA CALC: 35 % — LOW (ref 39–50)
HGB BLD CALC-MCNC: 11.2 G/DL — LOW (ref 11.5–15.5)
HGB BLD-MCNC: 11.3 G/DL — LOW (ref 11.5–15.5)
HYALINE CASTS # UR AUTO: ABNORMAL
INR BLD: 1.27 RATIO — HIGH (ref 0.88–1.16)
KETONES UR-MCNC: NEGATIVE — SIGNIFICANT CHANGE UP
LACTATE BLDV-MCNC: 1.4 MMOL/L — SIGNIFICANT CHANGE UP (ref 0.7–2)
LEUKOCYTE ESTERASE UR-ACNC: ABNORMAL
LYMPHOCYTES # BLD AUTO: 1.9 K/UL — SIGNIFICANT CHANGE UP (ref 1–3.3)
LYMPHOCYTES # BLD AUTO: 17.2 % — SIGNIFICANT CHANGE UP (ref 13–44)
MCHC RBC-ENTMCNC: 31.8 PG — SIGNIFICANT CHANGE UP (ref 27–34)
MCHC RBC-ENTMCNC: 32.6 GM/DL — SIGNIFICANT CHANGE UP (ref 32–36)
MCV RBC AUTO: 97.7 FL — SIGNIFICANT CHANGE UP (ref 80–100)
MONOCYTES # BLD AUTO: 0.8 K/UL — SIGNIFICANT CHANGE UP (ref 0–0.9)
MONOCYTES NFR BLD AUTO: 6.9 % — SIGNIFICANT CHANGE UP (ref 2–14)
NEUTROPHILS # BLD AUTO: 8.2 K/UL — HIGH (ref 1.8–7.4)
NEUTROPHILS NFR BLD AUTO: 74.8 % — SIGNIFICANT CHANGE UP (ref 43–77)
NITRITE UR-MCNC: NEGATIVE — SIGNIFICANT CHANGE UP
NT-PROBNP SERPL-SCNC: 74 PG/ML — SIGNIFICANT CHANGE UP (ref 0–300)
OTHER CELLS CSF MANUAL: 8 ML/DL — LOW (ref 18–22)
PCO2 BLDV: 52 MMHG — HIGH (ref 35–50)
PH BLDV: 7.36 — SIGNIFICANT CHANGE UP (ref 7.35–7.45)
PH UR: 7 — SIGNIFICANT CHANGE UP (ref 5–8)
PLATELET # BLD AUTO: 171 K/UL — SIGNIFICANT CHANGE UP (ref 150–400)
PO2 BLDV: 31 MMHG — SIGNIFICANT CHANGE UP (ref 25–45)
POTASSIUM BLDV-SCNC: 4.4 MMOL/L — SIGNIFICANT CHANGE UP (ref 3.5–5.3)
POTASSIUM SERPL-MCNC: 4.4 MMOL/L — SIGNIFICANT CHANGE UP (ref 3.5–5.3)
POTASSIUM SERPL-SCNC: 4.4 MMOL/L — SIGNIFICANT CHANGE UP (ref 3.5–5.3)
PROT SERPL-MCNC: 7 G/DL — SIGNIFICANT CHANGE UP (ref 6–8.3)
PROT UR-MCNC: SIGNIFICANT CHANGE UP
PROTHROM AB SERPL-ACNC: 13.9 SEC — HIGH (ref 9.8–12.7)
RAPID RVP RESULT: SIGNIFICANT CHANGE UP
RBC # BLD: 3.56 M/UL — LOW (ref 3.8–5.2)
RBC # FLD: 13 % — SIGNIFICANT CHANGE UP (ref 10.3–14.5)
RBC CASTS # UR COMP ASSIST: ABNORMAL /HPF (ref 0–2)
SAO2 % BLDV: 54 % — LOW (ref 67–88)
SODIUM SERPL-SCNC: 139 MMOL/L — SIGNIFICANT CHANGE UP (ref 135–145)
SP GR SPEC: 1.02 — SIGNIFICANT CHANGE UP (ref 1.01–1.02)
TROPONIN T, HIGH SENSITIVITY RESULT: 51 NG/L — SIGNIFICANT CHANGE UP (ref 0–51)
TSH SERPL-MCNC: 0.69 UIU/ML — SIGNIFICANT CHANGE UP (ref 0.27–4.2)
UROBILINOGEN FLD QL: NEGATIVE — SIGNIFICANT CHANGE UP
WBC # BLD: 11 K/UL — HIGH (ref 3.8–10.5)
WBC # FLD AUTO: 11 K/UL — HIGH (ref 3.8–10.5)
WBC UR QL: >50 /HPF (ref 0–5)

## 2018-07-02 PROCEDURE — 99285 EMERGENCY DEPT VISIT HI MDM: CPT

## 2018-07-02 PROCEDURE — 71046 X-RAY EXAM CHEST 2 VIEWS: CPT | Mod: 26

## 2018-07-02 PROCEDURE — 99223 1ST HOSP IP/OBS HIGH 75: CPT | Mod: AI

## 2018-07-02 RX ORDER — AZITHROMYCIN 500 MG/1
500 TABLET, FILM COATED ORAL ONCE
Qty: 0 | Refills: 0 | Status: COMPLETED | OUTPATIENT
Start: 2018-07-02 | End: 2018-07-02

## 2018-07-02 RX ORDER — APIXABAN 2.5 MG/1
5 TABLET, FILM COATED ORAL EVERY 12 HOURS
Qty: 0 | Refills: 0 | Status: DISCONTINUED | OUTPATIENT
Start: 2018-07-02 | End: 2018-07-05

## 2018-07-02 RX ORDER — SPIRONOLACTONE 25 MG/1
12.5 TABLET, FILM COATED ORAL DAILY
Qty: 0 | Refills: 0 | Status: DISCONTINUED | OUTPATIENT
Start: 2018-07-02 | End: 2018-07-05

## 2018-07-02 RX ORDER — DEXTROSE 50 % IN WATER 50 %
15 SYRINGE (ML) INTRAVENOUS ONCE
Qty: 0 | Refills: 0 | Status: DISCONTINUED | OUTPATIENT
Start: 2018-07-02 | End: 2018-07-05

## 2018-07-02 RX ORDER — DIPHENHYDRAMINE HCL 50 MG
25 CAPSULE ORAL ONCE
Qty: 0 | Refills: 0 | Status: COMPLETED | OUTPATIENT
Start: 2018-07-02 | End: 2018-07-02

## 2018-07-02 RX ORDER — ENOXAPARIN SODIUM 100 MG/ML
40 INJECTION SUBCUTANEOUS EVERY 24 HOURS
Qty: 0 | Refills: 0 | Status: DISCONTINUED | OUTPATIENT
Start: 2018-07-02 | End: 2018-07-02

## 2018-07-02 RX ORDER — IPRATROPIUM/ALBUTEROL SULFATE 18-103MCG
3 AEROSOL WITH ADAPTER (GRAM) INHALATION EVERY 6 HOURS
Qty: 0 | Refills: 0 | Status: COMPLETED | OUTPATIENT
Start: 2018-07-02 | End: 2018-07-03

## 2018-07-02 RX ORDER — SODIUM CHLORIDE 9 MG/ML
1000 INJECTION, SOLUTION INTRAVENOUS
Qty: 0 | Refills: 0 | Status: DISCONTINUED | OUTPATIENT
Start: 2018-07-02 | End: 2018-07-03

## 2018-07-02 RX ORDER — DEXTROSE 50 % IN WATER 50 %
25 SYRINGE (ML) INTRAVENOUS ONCE
Qty: 0 | Refills: 0 | Status: DISCONTINUED | OUTPATIENT
Start: 2018-07-02 | End: 2018-07-05

## 2018-07-02 RX ORDER — IPRATROPIUM/ALBUTEROL SULFATE 18-103MCG
3 AEROSOL WITH ADAPTER (GRAM) INHALATION ONCE
Qty: 0 | Refills: 0 | Status: COMPLETED | OUTPATIENT
Start: 2018-07-02 | End: 2018-07-02

## 2018-07-02 RX ORDER — DEXTROSE 50 % IN WATER 50 %
12.5 SYRINGE (ML) INTRAVENOUS ONCE
Qty: 0 | Refills: 0 | Status: DISCONTINUED | OUTPATIENT
Start: 2018-07-02 | End: 2018-07-05

## 2018-07-02 RX ORDER — GLUCAGON INJECTION, SOLUTION 0.5 MG/.1ML
1 INJECTION, SOLUTION SUBCUTANEOUS ONCE
Qty: 0 | Refills: 0 | Status: DISCONTINUED | OUTPATIENT
Start: 2018-07-02 | End: 2018-07-05

## 2018-07-02 RX ORDER — TIOTROPIUM BROMIDE 18 UG/1
1 CAPSULE ORAL; RESPIRATORY (INHALATION) DAILY
Qty: 0 | Refills: 0 | Status: DISCONTINUED | OUTPATIENT
Start: 2018-07-02 | End: 2018-07-02

## 2018-07-02 RX ORDER — ASPIRIN/CALCIUM CARB/MAGNESIUM 324 MG
243 TABLET ORAL ONCE
Qty: 0 | Refills: 0 | Status: COMPLETED | OUTPATIENT
Start: 2018-07-02 | End: 2018-07-02

## 2018-07-02 RX ORDER — INSULIN LISPRO 100/ML
VIAL (ML) SUBCUTANEOUS AT BEDTIME
Qty: 0 | Refills: 0 | Status: DISCONTINUED | OUTPATIENT
Start: 2018-07-02 | End: 2018-07-03

## 2018-07-02 RX ORDER — INSULIN LISPRO 100/ML
VIAL (ML) SUBCUTANEOUS
Qty: 0 | Refills: 0 | Status: DISCONTINUED | OUTPATIENT
Start: 2018-07-02 | End: 2018-07-03

## 2018-07-02 RX ORDER — AZITHROMYCIN 500 MG/1
250 TABLET, FILM COATED ORAL DAILY
Qty: 0 | Refills: 0 | Status: DISCONTINUED | OUTPATIENT
Start: 2018-07-03 | End: 2018-07-05

## 2018-07-02 RX ORDER — BUDESONIDE AND FORMOTEROL FUMARATE DIHYDRATE 160; 4.5 UG/1; UG/1
2 AEROSOL RESPIRATORY (INHALATION)
Qty: 0 | Refills: 0 | Status: DISCONTINUED | OUTPATIENT
Start: 2018-07-02 | End: 2018-07-05

## 2018-07-02 RX ORDER — FUROSEMIDE 40 MG
40 TABLET ORAL
Qty: 0 | Refills: 0 | Status: DISCONTINUED | OUTPATIENT
Start: 2018-07-02 | End: 2018-07-05

## 2018-07-02 RX ORDER — FUROSEMIDE 40 MG
40 TABLET ORAL ONCE
Qty: 0 | Refills: 0 | Status: DISCONTINUED | OUTPATIENT
Start: 2018-07-02 | End: 2018-07-02

## 2018-07-02 RX ORDER — MAGNESIUM SULFATE 500 MG/ML
2 VIAL (ML) INJECTION ONCE
Qty: 0 | Refills: 0 | Status: COMPLETED | OUTPATIENT
Start: 2018-07-02 | End: 2018-07-02

## 2018-07-02 RX ORDER — ASPIRIN/CALCIUM CARB/MAGNESIUM 324 MG
81 TABLET ORAL DAILY
Qty: 0 | Refills: 0 | Status: DISCONTINUED | OUTPATIENT
Start: 2018-07-02 | End: 2018-07-03

## 2018-07-02 RX ORDER — ATORVASTATIN CALCIUM 80 MG/1
20 TABLET, FILM COATED ORAL AT BEDTIME
Qty: 0 | Refills: 0 | Status: DISCONTINUED | OUTPATIENT
Start: 2018-07-02 | End: 2018-07-05

## 2018-07-02 RX ORDER — MONTELUKAST 4 MG/1
10 TABLET, CHEWABLE ORAL DAILY
Qty: 0 | Refills: 0 | Status: DISCONTINUED | OUTPATIENT
Start: 2018-07-02 | End: 2018-07-05

## 2018-07-02 RX ORDER — INSULIN GLARGINE 100 [IU]/ML
8 INJECTION, SOLUTION SUBCUTANEOUS AT BEDTIME
Qty: 0 | Refills: 0 | Status: DISCONTINUED | OUTPATIENT
Start: 2018-07-02 | End: 2018-07-03

## 2018-07-02 RX ORDER — REPAGLINIDE 1 MG/1
0.5 TABLET ORAL THREE TIMES A DAY
Qty: 0 | Refills: 0 | Status: DISCONTINUED | OUTPATIENT
Start: 2018-07-02 | End: 2018-07-02

## 2018-07-02 RX ADMIN — Medication 60 MILLIGRAM(S): at 22:25

## 2018-07-02 RX ADMIN — Medication 100 MILLIGRAM(S): at 23:24

## 2018-07-02 RX ADMIN — ATORVASTATIN CALCIUM 20 MILLIGRAM(S): 80 TABLET, FILM COATED ORAL at 22:25

## 2018-07-02 RX ADMIN — Medication 3 MILLILITER(S): at 18:33

## 2018-07-02 RX ADMIN — AZITHROMYCIN 250 MILLIGRAM(S): 500 TABLET, FILM COATED ORAL at 18:33

## 2018-07-02 RX ADMIN — INSULIN GLARGINE 8 UNIT(S): 100 INJECTION, SOLUTION SUBCUTANEOUS at 23:31

## 2018-07-02 RX ADMIN — Medication 50 GRAM(S): at 20:44

## 2018-07-02 RX ADMIN — Medication 243 MILLIGRAM(S): at 18:33

## 2018-07-02 RX ADMIN — Medication 25 MILLIGRAM(S): at 22:52

## 2018-07-02 RX ADMIN — Medication 3 MILLILITER(S): at 23:24

## 2018-07-02 NOTE — ED ADULT NURSE NOTE - CHIEF COMPLAINT QUOTE
headache, right facial pain and light headed since this morning, had chest discomfort resolved after 5 minutes

## 2018-07-02 NOTE — H&P ADULT - PSH
AICD (Automatic Cardioverter/Defibrillator) Present  inserted in Aug, 2008. Due for battery change in 1 month. ( [employer]) . Inserted by Dr Duffy  S/P cholecystectomy

## 2018-07-02 NOTE — ED ADULT TRIAGE NOTE - CHIEF COMPLAINT QUOTE
headache, right jaw pain and light headed since this morning, had chest pressure/tightness resolved after 5 minutes headache, right facial pain and light headed since this morning, had chest discomfort resolved after 5 minutes

## 2018-07-02 NOTE — H&P ADULT - NSHPLABSRESULTS_GEN_ALL_CORE
11.3   11.0  )-----------( 171      ( 02 Jul 2018 17:51 )             34.7     07-02    139  |  100  |  23  ----------------------------<  113<H>  4.4   |  27  |  1.18    Ca    9.4      02 Jul 2018 17:51    TPro  7.0  /  Alb  3.8  /  TBili  0.4  /  DBili  x   /  AST  15  /  ALT  14  /  AlkPhos  106  07-02    PT/INR - ( 02 Jul 2018 17:51 )   PT: 13.9 sec;   INR: 1.27 ratio         PTT - ( 02 Jul 2018 17:51 )  PTT:31.9 sec 11.3   11.0  )-----------( 171      ( 02 Jul 2018 17:51 )             34.7     07-02    139  |  100  |  23  ----------------------------<  113<H>  4.4   |  27  |  1.18    Ca    9.4      02 Jul 2018 17:51    TPro  7.0  /  Alb  3.8  /  TBili  0.4  /  DBili  x   /  AST  15  /  ALT  14  /  AlkPhos  106  07-02    PT/INR - ( 02 Jul 2018 17:51 )   PT: 13.9 sec;   INR: 1.27 ratio         PTT - ( 02 Jul 2018 17:51 )  PTT:31.9 sec    < from: Xray Chest 2 Views PA/Lat (07.02.18 @ 19:12) >      ******PRELIMINARY REPORT******    ******PRELIMINARY REPORT******          EXAM:  XR CHEST PA LAT 2V                            PROCEDURE DATE:  07/02/2018      ******PRELIMINARY REPORT******    ******PRELIMINARY REPORT******              INTERPRETATION:  clear lungs              ******PRELIMINARY REPORT******    ******PRELIMINARY REPORT******          TARSHA RODRÍGUEZ M.D., RADIOLOGY RESIDENT        < end of copied text >    CXR on my read appears clear without focal area of consolidation. 11.3   11.0  )-----------( 171      ( 02 Jul 2018 17:51 )             34.7     07-02    139  |  100  |  23  ----------------------------<  113<H>  4.4   |  27  |  1.18    Ca    9.4      02 Jul 2018 17:51    TPro  7.0  /  Alb  3.8  /  TBili  0.4  /  DBili  x   /  AST  15  /  ALT  14  /  AlkPhos  106  07-02    PT/INR - ( 02 Jul 2018 17:51 )   PT: 13.9 sec;   INR: 1.27 ratio         PTT - ( 02 Jul 2018 17:51 )  PTT:31.9 sec    < from: Xray Chest 2 Views PA/Lat (07.02.18 @ 19:12) >      ******PRELIMINARY REPORT******    ******PRELIMINARY REPORT******          EXAM:  XR CHEST PA LAT 2V                          PROCEDURE DATE:  07/02/2018     ******PRELIMINARY REPORT******    ******PRELIMINARY REPORT******        INTERPRETATION:  clear lungs      ******PRELIMINARY REPORT******    ******PRELIMINARY REPORT******          TARSHA RODRÍGUEZ M.D., RADIOLOGY RESIDENT        < end of copied text >    CXR on my read appears clear without focal area of consolidation.    EKG reviewed- V paced HR 80s, Q waves V3-V6, avF, II, III comparable to previous EKG in Mar 2018

## 2018-07-02 NOTE — H&P ADULT - HISTORY OF PRESENT ILLNESS
This is a 74 y/o F w/ a PMH significant for asthma w/ frequent exacerbations, A-flutter on Eliquis, HFrEF s/p AICD, IDDM2, and HTN who presented to the ED w/ SOB that has been worsening over the past few days. Patient noted that as the temperature was increasing over the past few days, she has also been having worsening SOB, which is a common issue for her when she gets an asthma exacerbation. She denies any sick contacts, and only recent travel was to [place] to see family over 2 weeks ago. She endorses a cough that has also been worsening, productive of yellow sputum. Denies fevers/chills, n/v, CP/palpitations. She did use her nebulizer and inhaler w/ minimal improvement in symptoms.     In the ED, patient's vitals were: temp 97.8F, HR 79, /75, RR 18, saturating 97% on RA. Labs and imaging significant for: WBC 11K, negative RVP, and CXR w/ no consolidations, vascular congestion, or pleural effusions. She received 500 mg IV azithromycin, duonebs x3, and 60 mg prednisone. This is a 74 y/o F w/ a PMH significant for asthma w/ frequent exacerbations, A-flutter on Eliquis, HFrEF s/p AICD, IDDM2, and HTN who presented to the ED w/ SOB that has been worsening over the past few days. Patient noted that as the temperature was increasing over the past few days, she has also been having worsening SOB, which is a common issue for her when she gets an asthma exacerbation. She denies any sick contacts, and only recent travel was to [place] to see family over 2 weeks ago. She endorses a cough that has also been worsening, productive of yellow sputum. Denies fevers/chills, n/v, CP/palpitations. She did use her nebulizer and inhaler w/ minimal improvement in symptoms. Of note, patient has been intubated in the past for asthma exacerbation (2016).     In the ED, patient's vitals were: temp 97.8F, HR 79, /75, RR 18, saturating 97% on RA. Labs and imaging significant for: WBC 11K, negative RVP, and CXR w/ no consolidations, vascular congestion, or pleural effusions. She received 500 mg IV azithromycin, duonebs x3, and 60 mg prednisone. This is a 74 y/o F w/ a PMH significant for asthma w/ frequent exacerbations, A-flutter on Eliquis, HFrEF s/p AICD, IDDM2, and HTN who presented to the ED w/ SOB that has been worsening over the past few days. Patient noted that as the temperature was increasing over the past few days, she has also been having worsening SOB, which is a common issue for her when she gets an asthma exacerbation. She denies any sick contacts, and only recent travel was to [place] to see family over 2 weeks ago. She endorses a cough that has also been worsening, productive of yellow sputum. Denies fevers/chills, n/v, CP/palpitations. She did use her nebulizer and inhaler w/ minimal improvement in symptoms. Of note, patient has been intubated in the past for asthma exacerbation (2016), underwent tracheostomy and subsequently reversal.     In the ED, patient's vitals were: temp 97.8F, HR 79, /75, RR 18, saturating 97% on RA. Labs and imaging significant for: WBC 11K, negative RVP, and CXR w/ no consolidations, vascular congestion, or pleural effusions. She received 500 mg IV azithromycin, duonebs x3, and 60 mg prednisone. This is a 72 y/o F w/ a PMH significant for asthma w/ frequent exacerbations, A-flutter on Eliquis, HFrEF s/p AICD, IDDM2, and HTN who presented to the ED w/ SOB that has been worsening over the past few days. Patient noted that as the temperature was increasing over the past few days, she has also been having worsening SOB, which is a common issue for her when she gets an asthma exacerbation. She denies any sick contacts, and only recent travel was to [place] to see family over 2 weeks ago. She endorses a cough that has also been worsening, productive of yellow sputum. Denies fevers/chills, n/v, CP/palpitations. She did use her nebulizer and inhaler w/ minimal improvement in symptoms. Of note, patient has been intubated in the past for respiratory failure (2016), underwent tracheostomy and subsequently reversal.     In the ED, patient's vitals were: temp 97.8F, HR 79, /75, RR 18, saturating 97% on RA. Labs and imaging significant for: WBC 11K, negative RVP, and CXR w/ no consolidations, vascular congestion, or pleural effusions. She received 500 mg IV azithromycin, duonebs x3, and 60 mg prednisone. This is a 74 y/o F w/ a PMH significant for asthma w/ frequent exacerbations, A-flutter on Eliquis, HFrEF s/p AICD, IDDM2, and HTN who presented to the ED w/ SOB that has been worsening over the past few days. Patient noted that as the temperature was increasing over the past few days, she has also been having worsening SOB, which is a common issue for her when she gets an asthma exacerbation. She denies any sick contacts, and only recent travel was to [place] to see family over 2 weeks ago. She endorses a cough that has also been worsening, productive of yellow sputum. Denies fevers/chills, n/v, CP/palpitations. She did use her nebulizer and inhaler w/ minimal improvement in symptoms. Of note, patient has been intubated in the past for respiratory failure (2016), underwent tracheostomy and subsequently reversal.     In the ED, patient's vitals were: temp 97.8F, HR 79, /75, RR 18, saturating 97% on RA. Labs and imaging significant for: WBC 11K, negative RVP, and CXR w/ no consolidations, vascular congestion, or pleural effusions. She received 500 mg IV azithromycin, duonebs x3, and 60 mg prednisone.

## 2018-07-02 NOTE — H&P ADULT - PMH
Asthma    Atrial flutter    Cardiac Pacemaker    Cardiomyopathy    Congestive Heart Failure    Diabetes    Diverticulitis    Gout    HTN - Hypertension    Kidney stone    Vertigo

## 2018-07-02 NOTE — H&P ADULT - PROBLEM SELECTOR PLAN 1
Patient presents w/ worsening SOB and cough productive of yellow sputum. She denies fevers. Saturating well on RA.   - standing duonebs ATC x24 hours, then re-assess  - would taper prednisone down to 40 mg daily for another 4 days  - c/w azithromycin  mg daily for another 4 days  - c/w symbicort, singulair, spiriva Patient presents w/ worsening SOB and cough productive of yellow sputum. She denies fevers. Saturating well on RA.   - standing duonebs ATC x24 hours, then re-assess  - would taper prednisone down to 40 mg daily for another 4 days  - c/w azithromycin  mg daily for another 4 days  - c/w symbicort, singulair  - will hold spiriva while on duonebs  - will check peak flow measurements

## 2018-07-02 NOTE — ED ADULT NURSE NOTE - OBJECTIVE STATEMENT
73 y.o female extensive pmh of vertigo, a.flutter, cardiomyopathy, COPD, pacemaker, HTN, DM, CHF, and asthma presenting to ED accompanied by her daughter c/o generalized diffuse cp, associated with r. sided facial pain x today. pt states she has an "uneasy feeling" in her chest, denies pain or radiation. describes the sensation as dull. no numbness or tingling present. she states she also has been having intermittent r. sided facial pain. denies current cp or face pain upon assessment. VS stable. denies any recent cough, chills, fever, weakness, dizziness 73 y.o female extensive pmh of vertigo, a.flutter, cardiomyopathy, COPD, pacemaker, HTN, DM, CHF, and asthma presenting to ED accompanied by her daughter c/o generalized diffuse cp, associated with r. sided facial pain x today. pt states she has an "uneasy feeling" in her chest, denies pain or radiation. describes the sensation as dull with no numbness or tingling present. she states she also has been having intermittent r. sided facial pain. denies current cp or face pain upon assessment. VS stable. denies any recent cough, chills, fever, weakness, dizziness. daughter remains at the bedside. line labs and RVP obtained, results pending. safety and fall precautions maintained, call bell within reach.

## 2018-07-02 NOTE — H&P ADULT - NSHPPHYSICALEXAM_GEN_ALL_CORE
Vital Signs Last 24 Hrs  T(C): 37 (02 Jul 2018 21:51), Max: 37 (02 Jul 2018 21:51)  T(F): 98.6 (02 Jul 2018 21:51), Max: 98.6 (02 Jul 2018 21:51)  HR: 100 (02 Jul 2018 21:51) (79 - 100)  BP: 135/77 (02 Jul 2018 21:51) (110/70 - 135/77)  BP(mean): --  RR: 18 (02 Jul 2018 21:51) (16 - 18)  SpO2: 98% (02 Jul 2018 21:51) (97% - 98%)    PHYSICAL EXAM:   GENERAL: no acute distress  HEENT: NC/AT, EOMI, neck supple, MMM  RESPIRATORY: scattered wheeze at b/l upper lobes, otherwise clear  CARDIOVASCULAR: RRR, no murmurs, gallops, rubs  ABDOMINAL: soft, non-tender, non-distended, positive bowel sounds   EXTREMITIES: no clubbing, cyanosis, or edema  NEUROLOGICAL: alert and oriented x 3, non-focal  SKIN: no rashes or lesions   MUSCULOSKELETAL: no gross joint deformity Vital Signs Last 24 Hrs  T(C): 37 (02 Jul 2018 21:51), Max: 37 (02 Jul 2018 21:51)  T(F): 98.6 (02 Jul 2018 21:51), Max: 98.6 (02 Jul 2018 21:51)  HR: 100 (02 Jul 2018 21:51) (79 - 100)  BP: 135/77 (02 Jul 2018 21:51) (110/70 - 135/77)  BP(mean): --  RR: 18 (02 Jul 2018 21:51) (16 - 18)  SpO2: 98% (02 Jul 2018 21:51) (97% - 98%)    PHYSICAL EXAM:   GENERAL: no acute distress  HEENT: NC/AT, EOMI, neck supple, MMM  RESPIRATORY: scattered wheeze at b/l upper lobes, otherwise clear  CARDIOVASCULAR: RRR, no murmurs, gallops, rubs  ABDOMINAL: soft, non-tender, non-distended, positive bowel sounds   EXTREMITIES: no clubbing, cyanosis, or edema  NEUROLOGICAL: alert and oriented x 3, non-focal  SKIN: no rashes or lesions   MUSCULOSKELETAL: no gross joint deformity  PSYCH: calm, pleasant, normal thought process

## 2018-07-02 NOTE — H&P ADULT - ASSESSMENT
72 y/o F w/ a PMH significant for asthma w/ frequent exacerbations, A-flutter on Eliquis, HFrEF s/p AICD, IDDM2, and HTN who presented to the ED w/ SOB that has been worsening over the past few days.

## 2018-07-02 NOTE — ED PROVIDER NOTE - OBJECTIVE STATEMENT
73 Female Hx COPD, A Flutter with pacer, CHF on lasix, DM HTN who presents to the ED for a few days of worsening SOB and chest tightness. reports taking her steroid inhaler and proair as prescribed with no relief. tends to occur and worsen with weather changes. + cough with phlegm production. No fever/chills. reports feeling okay at rest but has worsening symptoms when she gets up to walk around. no weight gain or worsening of LE edema. States swelling is actually Improved from baseline.   PMD Kirill Moreno

## 2018-07-02 NOTE — ED PROVIDER NOTE - PMH
Asthma    Atrial flutter    Cardiac Pacemaker    Cardiomyopathy    Congestive Heart Failure    COPD (chronic obstructive pulmonary disease)    Diabetes    Diverticulitis    Gout    HTN - Hypertension    Kidney stone    Vertigo

## 2018-07-02 NOTE — ED PROVIDER NOTE - MEDICAL DECISION MAKING DETAILS
73 Female Hx COPD, A Flutter with pacer which was placed originally in 2008 and chanved in nov 1017, CHF on lasix and eliquis, DM HTN who presents to the ED for a few days of worsening SOB and chest tightness. reports taking her steroid inhaler and proair as prescribed with no relief. patient with no signs of overt volume overload. tachyneic and wheezing diffusely. no fever or chills but does have yellow sputum production. probably COPD exacerbation vx CHF. will obtain cardiac enzymes, tsh, CXR and possible admission. ZR

## 2018-07-02 NOTE — H&P ADULT - ATTENDING COMMENTS
Patient assigned to me by night hospitalist in charge for management and care for patient for this evening only. Care to be resumed by day hospitalist in the morning and thereafter. Patient assigned to me by night hospitalist in charge for management and care for patient for this evening only. Care to be resumed by day hospitalist in the morning and thereafter.    74 y/o F w/ a PMH significant for asthma w/ frequent exacerbations, A-flutter on Eliquis, HFrEF s/p AICD, IDDM2, and HTN who presented to the ED w/ progressive SOB associated with yellow sputum production. She notes hx of 5-6 hospitalizations over the last year for asthma exacerbations, and frequent steroid use. She had been intubated in 2016, with tracheostomy and reversal for acute respiratory failure 2/2 PNA and influenza. The patient notes that hot weather and cleaning solvents are common triggers for her asthma. Her symptoms were not adequately controlled on home inhalers, nebulizer. VS reviewed, exam is notable for: NAD, A&O x 3, PERRL, EOMi, no cervical lymphadenopathy, no JVD, lungs with diffuse bilateral wheezing but no rhonci or crackles, soft nontender nondistended abdomen, and 1+ bilateral pedal edema.   I agree with plan for prednisone, azithromycin, duonebs, peak flow measurement, and reevaluation of asthma regimen to potentially step-up therapy given frequent hospitalizations. Plan as noted above. Patient assigned to me by night hospitalist in charge for management and care for patient for this evening only. Care to be resumed by day hospitalist in the morning and thereafter.    72 y/o F w/ a PMH significant for asthma w/ frequent exacerbations, A-flutter on Eliquis, HFrEF s/p AICD, IDDM2, and HTN who presented to the ED w/ progressive SOB associated with yellow sputum production. She notes hx of 5-6 hospitalizations over the last year for asthma exacerbations, and frequent steroid use. She had been intubated in 2016, with tracheostomy and reversal for acute respiratory failure 2/2 PNA and influenza. The patient notes that hot weather and cleaning solvents are common triggers for her asthma. Her symptoms were not adequately controlled on home inhalers, nebulizer. VS reviewed, exam is notable for: NAD, A&O x 3, PERRL, EOMi, no cervical lymphadenopathy, no JVD, lungs with diffuse bilateral wheezing but no rhonci or crackles, soft nontender nondistended abdomen, and 1+ bilateral pedal edema.   I agree with plan for prednisone, azithromycin, duonebs, peak flow measurement, and reevaluation of asthma regimen to potentially step-up therapy given frequent hospitalizations. Monitor daily weight while on prednisone. Plan as noted above.

## 2018-07-02 NOTE — ED PROVIDER NOTE - NEURO NEGATIVE STATEMENT, MLM
"Chief Complaint   Patient presents with     RECHECK     follow up cpap       Initial /66 (BP Location: Left arm, Patient Position: Chair, Cuff Size: Adult Large)  Pulse 60  Resp 20  Ht 1.6 m (5' 3\")  Wt (!) 154.5 kg (340 lb 11.2 oz)  SpO2 99%  BMI 60.35 kg/m2 Estimated body mass index is 60.35 kg/(m^2) as calculated from the following:    Height as of this encounter: 1.6 m (5' 3\").    Weight as of this encounter: 154.5 kg (340 lb 11.2 oz).  Medication Reconciliation: complete       Renae MOROCHO      "
no loss of consciousness, no gait abnormality, no headache, no sensory deficits, and no weakness.

## 2018-07-02 NOTE — H&P ADULT - NSHPSOCIALHISTORY_GEN_ALL_CORE
Lives at home w/ daughter. Has a few steps to climb to get into house, but lives on first floor. Ambulates w/ a cane usually. Denies any h/o tobacco, EtOH, or illicit drug use.

## 2018-07-02 NOTE — H&P ADULT - PROBLEM SELECTOR PLAN 2
Patient takes 8U lantus qhs at home, and is on 0.5 mg prandin TID AC.   - A1c in the AM  - c/w lantus 8U qhs and prandin 0.5 mg TID AC Patient takes 8U lantus qhs at home, and is on 0.5 mg prandin TID AC.   - A1c in the AM  - c/w lantus 8U qhs and ISS as patient on steroids

## 2018-07-02 NOTE — H&P ADULT - NSHPOUTPATIENTPROVIDERS_GEN_ALL_CORE
Dr. Neena Comer, PCP: [phone]  Dr. Kirill Daily, Pulmonologist: [phone]  Dr. Balaji Hobson, HF: [phone]

## 2018-07-03 DIAGNOSIS — I50.42 CHRONIC COMBINED SYSTOLIC (CONGESTIVE) AND DIASTOLIC (CONGESTIVE) HEART FAILURE: ICD-10-CM

## 2018-07-03 LAB
ANION GAP SERPL CALC-SCNC: 13 MMOL/L — SIGNIFICANT CHANGE UP (ref 5–17)
BUN SERPL-MCNC: 24 MG/DL — HIGH (ref 7–23)
CALCIUM SERPL-MCNC: 9 MG/DL — SIGNIFICANT CHANGE UP (ref 8.4–10.5)
CHLORIDE SERPL-SCNC: 97 MMOL/L — SIGNIFICANT CHANGE UP (ref 96–108)
CO2 SERPL-SCNC: 24 MMOL/L — SIGNIFICANT CHANGE UP (ref 22–31)
CREAT SERPL-MCNC: 1.06 MG/DL — SIGNIFICANT CHANGE UP (ref 0.5–1.3)
GLUCOSE BLDC GLUCOMTR-MCNC: 185 MG/DL — HIGH (ref 70–99)
GLUCOSE BLDC GLUCOMTR-MCNC: 227 MG/DL — HIGH (ref 70–99)
GLUCOSE BLDC GLUCOMTR-MCNC: 297 MG/DL — HIGH (ref 70–99)
GLUCOSE BLDC GLUCOMTR-MCNC: 297 MG/DL — HIGH (ref 70–99)
GLUCOSE SERPL-MCNC: 303 MG/DL — HIGH (ref 70–99)
HCT VFR BLD CALC: 34.8 % — SIGNIFICANT CHANGE UP (ref 34.5–45)
HGB BLD-MCNC: 11.2 G/DL — LOW (ref 11.5–15.5)
MAGNESIUM SERPL-MCNC: 2.5 MG/DL — SIGNIFICANT CHANGE UP (ref 1.6–2.6)
MCHC RBC-ENTMCNC: 30.2 PG — SIGNIFICANT CHANGE UP (ref 27–34)
MCHC RBC-ENTMCNC: 32.2 GM/DL — SIGNIFICANT CHANGE UP (ref 32–36)
MCV RBC AUTO: 93.8 FL — SIGNIFICANT CHANGE UP (ref 80–100)
PHOSPHATE SERPL-MCNC: 3.6 MG/DL — SIGNIFICANT CHANGE UP (ref 2.5–4.5)
PLATELET # BLD AUTO: 187 K/UL — SIGNIFICANT CHANGE UP (ref 150–400)
POTASSIUM SERPL-MCNC: 4.9 MMOL/L — SIGNIFICANT CHANGE UP (ref 3.5–5.3)
POTASSIUM SERPL-SCNC: 4.9 MMOL/L — SIGNIFICANT CHANGE UP (ref 3.5–5.3)
RBC # BLD: 3.71 M/UL — LOW (ref 3.8–5.2)
RBC # FLD: 14.7 % — HIGH (ref 10.3–14.5)
SODIUM SERPL-SCNC: 134 MMOL/L — LOW (ref 135–145)
WBC # BLD: 10.3 K/UL — SIGNIFICANT CHANGE UP (ref 3.8–10.5)
WBC # FLD AUTO: 10.3 K/UL — SIGNIFICANT CHANGE UP (ref 3.8–10.5)

## 2018-07-03 PROCEDURE — 99223 1ST HOSP IP/OBS HIGH 75: CPT | Mod: GC

## 2018-07-03 PROCEDURE — 99233 SBSQ HOSP IP/OBS HIGH 50: CPT | Mod: GC

## 2018-07-03 RX ORDER — MAGNESIUM OXIDE 400 MG ORAL TABLET 241.3 MG
1 TABLET ORAL
Qty: 0 | Refills: 0 | COMMUNITY

## 2018-07-03 RX ORDER — INSULIN LISPRO 100/ML
VIAL (ML) SUBCUTANEOUS
Qty: 0 | Refills: 0 | Status: DISCONTINUED | OUTPATIENT
Start: 2018-07-03 | End: 2018-07-05

## 2018-07-03 RX ORDER — BUDESONIDE AND FORMOTEROL FUMARATE DIHYDRATE 160; 4.5 UG/1; UG/1
2 AEROSOL RESPIRATORY (INHALATION)
Qty: 0 | Refills: 0 | COMMUNITY

## 2018-07-03 RX ORDER — INSULIN GLARGINE 100 [IU]/ML
11 INJECTION, SOLUTION SUBCUTANEOUS AT BEDTIME
Qty: 0 | Refills: 0 | Status: DISCONTINUED | OUTPATIENT
Start: 2018-07-03 | End: 2018-07-04

## 2018-07-03 RX ORDER — INSULIN LISPRO 100/ML
VIAL (ML) SUBCUTANEOUS AT BEDTIME
Qty: 0 | Refills: 0 | Status: DISCONTINUED | OUTPATIENT
Start: 2018-07-03 | End: 2018-07-05

## 2018-07-03 RX ORDER — FAMOTIDINE 10 MG/ML
2 INJECTION INTRAVENOUS
Qty: 0 | Refills: 0 | COMMUNITY

## 2018-07-03 RX ORDER — ALBUTEROL 90 UG/1
2 AEROSOL, METERED ORAL
Qty: 0 | Refills: 0 | COMMUNITY

## 2018-07-03 RX ORDER — INSULIN GLARGINE 100 [IU]/ML
8 INJECTION, SOLUTION SUBCUTANEOUS
Qty: 0 | Refills: 0 | COMMUNITY

## 2018-07-03 RX ORDER — PANTOPRAZOLE SODIUM 20 MG/1
40 TABLET, DELAYED RELEASE ORAL
Qty: 0 | Refills: 0 | Status: DISCONTINUED | OUTPATIENT
Start: 2018-07-03 | End: 2018-07-05

## 2018-07-03 RX ADMIN — APIXABAN 5 MILLIGRAM(S): 2.5 TABLET, FILM COATED ORAL at 05:32

## 2018-07-03 RX ADMIN — Medication 40 MILLIGRAM(S): at 05:32

## 2018-07-03 RX ADMIN — Medication 40 MILLIGRAM(S): at 05:31

## 2018-07-03 RX ADMIN — Medication 3 MILLILITER(S): at 11:13

## 2018-07-03 RX ADMIN — BUDESONIDE AND FORMOTEROL FUMARATE DIHYDRATE 2 PUFF(S): 160; 4.5 AEROSOL RESPIRATORY (INHALATION) at 05:32

## 2018-07-03 RX ADMIN — AZITHROMYCIN 250 MILLIGRAM(S): 500 TABLET, FILM COATED ORAL at 11:13

## 2018-07-03 RX ADMIN — BUDESONIDE AND FORMOTEROL FUMARATE DIHYDRATE 2 PUFF(S): 160; 4.5 AEROSOL RESPIRATORY (INHALATION) at 17:15

## 2018-07-03 RX ADMIN — Medication 6: at 12:57

## 2018-07-03 RX ADMIN — Medication 3 MILLILITER(S): at 05:32

## 2018-07-03 RX ADMIN — PANTOPRAZOLE SODIUM 40 MILLIGRAM(S): 20 TABLET, DELAYED RELEASE ORAL at 11:13

## 2018-07-03 RX ADMIN — SPIRONOLACTONE 12.5 MILLIGRAM(S): 25 TABLET, FILM COATED ORAL at 05:31

## 2018-07-03 RX ADMIN — Medication 3: at 08:57

## 2018-07-03 RX ADMIN — ATORVASTATIN CALCIUM 20 MILLIGRAM(S): 80 TABLET, FILM COATED ORAL at 21:38

## 2018-07-03 RX ADMIN — INSULIN GLARGINE 11 UNIT(S): 100 INJECTION, SOLUTION SUBCUTANEOUS at 21:38

## 2018-07-03 RX ADMIN — APIXABAN 5 MILLIGRAM(S): 2.5 TABLET, FILM COATED ORAL at 17:15

## 2018-07-03 RX ADMIN — Medication 10 MILLIGRAM(S): at 05:32

## 2018-07-03 RX ADMIN — Medication 3 MILLILITER(S): at 17:15

## 2018-07-03 RX ADMIN — MONTELUKAST 10 MILLIGRAM(S): 4 TABLET, CHEWABLE ORAL at 11:14

## 2018-07-03 RX ADMIN — Medication 2: at 17:15

## 2018-07-03 RX ADMIN — Medication 10 MILLIGRAM(S): at 17:15

## 2018-07-03 RX ADMIN — Medication 81 MILLIGRAM(S): at 11:13

## 2018-07-03 RX ADMIN — Medication 40 MILLIGRAM(S): at 17:15

## 2018-07-03 NOTE — PROGRESS NOTE ADULT - ASSESSMENT
Ms. Dolan is a 74yo Woman w/ PMH of Asthma (frequent exacerbations, h/o intubation in 2016), AFlutter (on Eliquis), HFrEF (s/p AICD), T2DM (on insulin), and HTN p/w SOB concerning for acute asthma exacerbation.

## 2018-07-03 NOTE — PROGRESS NOTE ADULT - PROBLEM SELECTOR PLAN 1
Patient presents w/ worsening SOB and cough productive of yellow sputum. She denies fevers. Saturating well on RA.   - standing duonebs ATC x24 hours, then re-assess  - would taper prednisone down to 40 mg daily for another 4 days  - c/w azithromycin  mg daily for another 4 days  - c/w symbicort, singulair  - will hold spiriva while on duonebs  - will check peak flow measurements Acute Exacerbation  -continue with standing duonebs q6hrs ATC x24 hours, de-escalate as appropriate  -will begin Prednisone taper: 30mg tomorrow and then 20mg x3 days. Patient has   - c/w azithromycin  mg daily for another 4 days  - c/w symbicort, singulair  - will hold spiriva while on duonebs  - will check peak flow measurements Acute Exacerbation, no wsymptomatically improved w/o supplemental O2  -continue with standing duonebs q6hrs ATC x24 hours, de-escalate as appropriate  -will begin Prednisone taper: 30mg tomorrow and then 20mg x3 days. Patient has done well with 20mg during acute exacerbations in the past  -c/w azithromycin  mg daily for total 5 days  -c/w symbicort, singulair  -will hold spiriva while on duonebs  -daily peak flow to evaluate response

## 2018-07-03 NOTE — CONSULT NOTE ADULT - PROBLEM SELECTOR RECOMMENDATION 3
- Currently HD stable. Appears V paced on admission. Rate well controlled.  - C/w full AC w/ Eliquis.

## 2018-07-03 NOTE — CONSULT NOTE ADULT - PROBLEM SELECTOR RECOMMENDATION 2
- Chronic HF history. Evidence suggest patient most likely at baseline volume status despite chronic LE swelling.  - C/w home regimen lasix 40mg BID.  - Continue to monitor fluid status. Strict I&Os, daily BMP and daily weight. - Chronic HF history. Evidence suggest patient most likely at baseline volume status.  - C/w home regimen lasix 40mg BID.  - Continue to monitor fluid status. Strict I&Os, daily BMP and daily weight.

## 2018-07-03 NOTE — PROGRESS NOTE ADULT - PROBLEM SELECTOR PLAN 4
s/p AICD in 2008. Pt has follow up appointment in one month.   -c/w Eliquis s/p AICD in 2008. Pt has follow up appointment in one month. On exam pt has bilateral LE pitting edema to mid shins.   -c/w lasix 40mg BID  -c/w spironolactone 12.5mg daily s/p AICD in 2008. Pt has follow up appointment in one month. On exam pt has bilateral LE pitting edema to mid shins but chronic in nature as per pt.   -c/w lasix 40mg BID  -c/w spironolactone 12.5mg daily  - appreciate heart failure recs

## 2018-07-03 NOTE — PROGRESS NOTE ADULT - SUBJECTIVE AND OBJECTIVE BOX
ANDRE BARKER 73y Female  Patient is a 73y old  Female who presents with a chief complaint of asthma exacerbation (2018 22:41)    INTERVAL HPI/OVERNIGHT EVENTS:        REVIEW OF SYSTEMS:  CONSTITUTIONAL: No fever, weight loss, or fatigue  EYES: No eye pain, visual disturbances, or discharge  ENMT:  No difficulty hearing, tinnitus, vertigo; No sinus or throat pain  NECK: No pain or stiffness  BREASTS: No pain, masses, or nipple discharge  RESPIRATORY: No cough, wheezing, chills or hemoptysis; No shortness of breath  CARDIOVASCULAR: No chest pain, palpitations, dizziness, or leg swelling  GASTROINTESTINAL: No abdominal or epigastric pain. No nausea, vomiting, or hematemesis; No diarrhea or constipation. No melena or hematochezia.  GENITOURINARY: No dysuria, frequency, hematuria, or incontinence  NEUROLOGICAL: No headaches, memory loss, loss of strength, numbness, or tremors  SKIN: No itching, burning, rashes, or lesions   LYMPH NODES: No enlarged glands  ENDOCRINE: No heat or cold intolerance; No hair loss  MUSCULOSKELETAL: No joint pain or swelling; No muscle, back, or extremity pain  PSYCHIATRIC: No depression, anxiety, mood swings, or difficulty sleeping  HEME/LYMPH: No easy bruising, or bleeding gums  ALLERY AND IMMUNOLOGIC: No hives or eczema    PAST MEDICAL & SURGICAL HISTORY:  Vertigo  Atrial flutter  Diverticulitis  Cardiomyopathy  Kidney stone  Cardiac Pacemaker  HTN - Hypertension  Gout  Diabetes  Congestive Heart Failure  Asthma  S/P cholecystectomy  AICD (Automatic Cardioverter/Defibrillator) Present: inserted in Aug, 2008. Due for battery change in 1 month. ( [employer]) . Inserted by Dr Duffy    FAMILY HISTORY:  Family history of brain tumor (Mother)    SOCIAL HISTORY:  Lives at home w/ daughter, on first floor. Ambulates w/ a cane usually  Denies tobacco, EtOH, or illicit drug use.      T(C): 36.6 (18 @ 07:17), Max: 37 (18 @ 21:51)  HR: 80 (18 @ 07:17) (79 - 100)  BP: 165/75 (18 @ 07:17) (110/70 - 165/75)  RR: 18 (18 @ 07:17) (16 - 18)  SpO2: 96% (07-03-18 @ 05:29) (96% - 98%)  Wt(kg): --Vital Signs Last 24 Hrs  T(C): 36.6 (2018 07:17), Max: 37 (2018 21:51)  T(F): 97.8 (2018 07:17), Max: 98.6 (2018 21:51)  HR: 80 (2018 07:17) (79 - 100)  BP: 165/75 (2018 07:17) (110/70 - 165/75)  BP(mean): --  RR: 18 (2018 07:17) (16 - 18)  SpO2: 96% (2018 05:29) (96% - 98%)    PHYSICAL EXAM:  GENERAL: NAD, well-groomed, well-developed  HEAD:  Atraumatic, Normocephalic  EYES: EOMI, PERRLA, conjunctiva and sclera clear  ENMT: No tonsillar erythema, exudates, or enlargement; Moist mucous membranes, Good dentition, No lesions  NECK: Supple, No JVD, Normal thyroid  NERVOUS SYSTEM:  Alert & Oriented X3, Good concentration; Motor Strength 5/5 B/L upper and lower extremities; DTRs 2+ intact and symmetric  CHEST/LUNG: Clear to percussion bilaterally; No rales, rhonchi, wheezing, or rubs  HEART: Regular rate and rhythm; No murmurs, rubs, or gallops  ABDOMEN: Soft, Nontender, Nondistended; Bowel sounds present  EXTREMITIES:  2+ Peripheral Pulses, No clubbing, cyanosis, or edema  LYMPH: No lymphadenopathy noted  SKIN: No rashes or lesions    Consultant(s) Notes Reviewed:  [x ] YES  [ ] NO  Care Discussed with Consultants/Other Providers [ x] YES  [ ] NO    LABS:                        11.3   11.0  )-----------( 171      ( 2018 17:51 )             34.7     07-03    134<L>  |  97  |  24<H>  ----------------------------<  303<H>  4.9   |  24  |  1.06    Ca    9.0      2018 07:21  Phos  3.6     07-03  Mg     2.5     07-03    TPro  7.0  /  Alb  3.8  /  TBili  0.4  /  DBili  x   /  AST  15  /  ALT  14  /  AlkPhos  106  07-02    PT/INR - ( 2018 17:51 )   PT: 13.9 sec;   INR: 1.27 ratio         PTT - ( 2018 17:51 )  PTT:31.9 sec  Urinalysis Basic - ( 2018 20:52 )    Color: Yellow / Appearance: SL Turbid / S.024 / pH: x  Gluc: x / Ketone: Negative  / Bili: Negative / Urobili: Negative   Blood: x / Protein: Trace / Nitrite: Negative   Leuk Esterase: Large / RBC: 25-50 /HPF / WBC >50 /HPF   Sq Epi: x / Non Sq Epi: Occasional /HPF / Bacteria: Many /HPF    CAPILLARY BLOOD GLUCOSE  POCT Blood Glucose.: 130 mg/dL (2018 23:28)    Urinalysis Basic - ( 2018 20:52 )  Color: Yellow / Appearance: SL Turbid / S.024 / pH: x  Gluc: x / Ketone: Negative  / Bili: Negative / Urobili: Negative   Blood: x / Protein: Trace / Nitrite: Negative   Leuk Esterase: Large / RBC: 25-50 /HPF / WBC >50 /HPF   Sq Epi: x / Non Sq Epi: Occasional /HPF / Bacteria: Many /HPF        RADIOLOGY & ADDITIONAL TESTS:  CXR: Clear Lungs    Imaging Personally Reviewed:  [x] YES  [ ] NO ANDRE BARKER 73y Female  Patient is a 73y old  Female who presents with a chief complaint of asthma exacerbation (2018 22:41)    INTERVAL HPI:  This is a 74 y/o F w/ a PMH significant for asthma w/ frequent exacerbations, A-flutter on Eliquis, HFrEF s/p AICD, IDDM2, and HTN who presented to the ED w/ SOB that has been worsening over the past few days. Patient noted that as the temperature was increasing over the past few days, she has also been having worsening SOB, which is a common issue for her when she gets an asthma exacerbation. She denies any sick contacts, and only recent travel was to [place] to see family over 2 weeks ago. She endorses a cough that has also been worsening, productive of yellow sputum. Denies fevers/chills, n/v, CP/palpitations. She did use her nebulizer and inhaler w/ minimal improvement in symptoms. Of note, patient has been intubated in the past for respiratory failure (), underwent tracheostomy and subsequently reversal. In the ED, patient's vitals were: temp 97.8F, HR 79, /75, RR 18, saturating 97% on RA. Labs and imaging significant for: WBC 11K, negative RVP, and CXR w/ no consolidations, vascular congestion, or pleural effusions. She received 500 mg IV azithromycin, duonebs x3, and 60 mg prednisone.     OVERNIGHT EVENTS:   Patient feeling much better. No oxygen needed currently. No acute events overnight. Patient tolerating PO, having regular BMs, and OOB/ambulating within room without difficulty. Denies fever, chills, CP, SOB, N/V/D.      REVIEW OF SYSTEMS:  CONSTITUTIONAL: No fever, weight loss, or fatigue  EYES: No eye pain, visual disturbances, or discharge  ENMT:  No difficulty hearing, tinnitus, vertigo; No sinus or throat pain  NECK: No pain or stiffness  BREASTS: No pain, masses, or nipple discharge  RESPIRATORY: +cough, wheezing. No chills or hemoptysis; No shortness of breath  CARDIOVASCULAR: No chest pain, palpitations, dizziness, or leg swelling  GASTROINTESTINAL: No abdominal or epigastric pain. No nausea, vomiting, or hematemesis; No diarrhea or constipation.  GENITOURINARY: No dysuria, frequency, hematuria, or incontinence  NEUROLOGICAL: No headaches, memory loss, loss of strength, numbness, or tremors  SKIN: No itching, burning, rashes, or lesions   LYMPH NODES: No enlarged glands  ENDOCRINE: No heat or cold intolerance; No hair loss  MUSCULOSKELETAL: No joint pain or swelling; No muscle, back, or extremity pain  PSYCHIATRIC: No depression, anxiety, mood swings, or difficulty sleeping    PAST MEDICAL & SURGICAL HISTORY:  Vertigo  Atrial flutter  Diverticulitis  Cardiomyopathy  Kidney stone  Cardiac Pacemaker  HTN - Hypertension  Gout  Diabetes  Congestive Heart Failure  Asthma  S/P cholecystectomy  AICD (Automatic Cardioverter/Defibrillator) Present: inserted in Aug, 2008. Due for battery change in 1 month. ( Robert Applebaum MD) . Inserted by Dr Duffy    FAMILY HISTORY:  Family history of brain tumor (Mother)    SOCIAL HISTORY:  Lives at home w/ daughter, on first floor. Ambulates w/ a cane usually  Denies tobacco, EtOH, or illicit drug use.      T(C): 36.6 (18 @ 07:17), Max: 37 (18 @ 21:51)  HR: 80 (18 @ 07:17) (79 - 100)  BP: 165/75 (18 @ 07:17) (110/70 - 165/75)  RR: 18 (18 @ 07:17) (16 - 18)  SpO2: 96% (18 @ 05:29) (96% - 98%)  Wt(kg): --Vital Signs Last 24 Hrs  T(C): 36.6 (2018 07:17), Max: 37 (2018 21:51)  T(F): 97.8 (2018 07:17), Max: 98.6 (2018 21:51)  HR: 80 (2018 07:17) (79 - 100)  BP: 165/75 (2018 07:17) (110/70 - 165/75)  BP(mean): --  RR: 18 (2018 07:17) (16 - 18)  SpO2: 96% (2018 05:29) (96% - 98%)    PHYSICAL EXAM:  GENERAL: NAD, well-groomed, well-developed  HEAD:  Atraumatic, Normocephalic  EYES: EOMI, PERRLA, conjunctiva and sclera clear  ENMT: No tonsillar erythema, exudates, or enlargement; Moist mucous membranes, Good dentition, No lesions  NECK: Supple, No JVD, Normal thyroid  NERVOUS SYSTEM:  Alert & Oriented X3, Good concentration; Motor Strength 5/5 B/L upper and lower extremities; DTRs 2+ intact and symmetric  CHEST/LUNG: Clear to percussion bilaterally; No rales, rhonchi, wheezing, or rubs  HEART: Regular rate and rhythm; No murmurs, rubs, or gallops  ABDOMEN: Soft, Nontender, Nondistended; Bowel sounds present  EXTREMITIES:  2+ Peripheral Pulses, No clubbing, cyanosis, or edema  LYMPH: No lymphadenopathy noted  SKIN: No rashes or lesions    Consultant(s) Notes Reviewed:  [x ] YES  [ ] NO  Care Discussed with Consultants/Other Providers [ x] YES  [ ] NO    LABS:                        11.3   11.0  )-----------( 171      ( 2018 17:51 )             34.7     07-03    134<L>  |  97  |  24<H>  ----------------------------<  303<H>  4.9   |  24  |  1.06    Ca    9.0      2018 07:21  Phos  3.6     07-03  Mg     2.5     07-03    TPro  7.0  /  Alb  3.8  /  TBili  0.4  /  DBili  x   /  AST  15  /  ALT  14  /  AlkPhos  106  07-02    PT/INR - ( 2018 17:51 )   PT: 13.9 sec;   INR: 1.27 ratio         PTT - ( 2018 17:51 )  PTT:31.9 sec  Urinalysis Basic - ( 2018 20:52 )    Color: Yellow / Appearance: SL Turbid / S.024 / pH: x  Gluc: x / Ketone: Negative  / Bili: Negative / Urobili: Negative   Blood: x / Protein: Trace / Nitrite: Negative   Leuk Esterase: Large / RBC: 25-50 /HPF / WBC >50 /HPF   Sq Epi: x / Non Sq Epi: Occasional /HPF / Bacteria: Many /HPF    CAPILLARY BLOOD GLUCOSE  POCT Blood Glucose.: 130 mg/dL (2018 23:28)    Urinalysis Basic - ( 2018 20:52 )  Color: Yellow / Appearance: SL Turbid / S.024 / pH: x  Gluc: x / Ketone: Negative  / Bili: Negative / Urobili: Negative   Blood: x / Protein: Trace / Nitrite: Negative   Leuk Esterase: Large / RBC: 25-50 /HPF / WBC >50 /HPF   Sq Epi: x / Non Sq Epi: Occasional /HPF / Bacteria: Many /HPF    EKG: V-Paced    RADIOLOGY & ADDITIONAL TESTS:  CXR: Clear Lungs    Imaging Personally Reviewed:  [x] YES  [ ] NO

## 2018-07-03 NOTE — CONSULT NOTE ADULT - ATTENDING COMMENTS
Suad 74 yo F with chronic systolic HF and frequent hospitalizations for SOB admitted after she developed acute dyspnea being outside in the heat. She reports an improvement in symptoms already, but notes that she is "holding some water." Her JVP ~6 cm H2O and she has minimal LE edema. Lungs with some bronchospasm, but she is laying nearly flat without discomfort.  Labs and studies reviewed.    She appears well compensated from a HF perspective. Would continue her usual outpatient medications, as you are doing. No need for further HF intervention. Will sign off for now. Please call if additional questions or concerns.

## 2018-07-03 NOTE — PROGRESS NOTE ADULT - PROBLEM SELECTOR PLAN 1
Patient presenting with worsening SOB attributed to the heat, similar to prior asthma exacerbations.   - Continue standing Duoneb treatment ATC x24hrs, then re-assess  - Taper prednisone to 40mg daily day 1/4  - C/w azithromycin 250mg oral day 1/4   - c/w symbicort and singulair  - hold spiriva while on duonebs  - follow up peak flow measurements Patient presenting with worsening SOB attributed to the heat, similar to prior asthma exacerbations.   - Continue standing Duoneb treatment ATC x24hrs, then re-assess  - Taper prednisone 40mg today, dec to 30mg tomorrow  - C/w azithromycin 250mg oral   - c/w symbicort and singulair  - hold spiriva while on duonebs  - follow up peak flow measurements

## 2018-07-03 NOTE — CONSULT NOTE ADULT - ASSESSMENT
74 y/o F w/ a PMH significant for asthma w/ frequent exacerbations, A-flutter on Eliquis, HFrEF s/p AICD, IDDM2, and HTN who presented to the ED w/ SOB that has been worsening over the past few days. Cardiology service consulted given concern for HF exacerbation.

## 2018-07-03 NOTE — PROGRESS NOTE ADULT - SUBJECTIVE AND OBJECTIVE BOX
Subjective:   72 YO woman with PMH of asthma with frequent exacerbations, A-flutter on Eliquis, HFrEF s/p AICD, IDDM2, and HTN presenting with increasing SOB.    Overnight patient had a "coughing fit" and felt like she "could not breathe" after cleaning her room as she reports she is allergic to the cleaning product used. Otherwise no acute events overnight. She feels well this morning with improvement in her shortness of breath. She continues to cough intermittently with production of yellow sputum. She slept well overnight. She reports tender leg swelling that has been decreasing over the past few days. She denies fever, HA, chest pain, palpitations, dysuria, increased urinary frequency.     REVIEW OF SYSTEMS  General: no weakness, fevers, chills	  Respiratory and Thorax: +cough, +SOB  Cardiovascular:	no chest pain or palpitations  Gastrointestinal:	no abd pain, nausea, vomiting, diarrhea or constipation  Genitourinary: no dysuria or increased frequency  Musculoskeletal:	 + R sided back pain, chronic  Neurological: no dizziness or numbness      Objective:   Vital Signs Last 24 Hrs  T(C): 36.6 (03 Jul 2018 07:17), Max: 37 (02 Jul 2018 21:51)  T(F): 97.8 (03 Jul 2018 07:17), Max: 98.6 (02 Jul 2018 21:51)  HR: 80 (03 Jul 2018 07:17) (79 - 100)  BP: 165/75 (03 Jul 2018 07:17) (110/70 - 165/75)  BP(mean): --  RR: 18 (03 Jul 2018 07:17) (16 - 18)  SpO2: 96% (03 Jul 2018 05:29) (96% - 98%)    PHYSICAL EXAM:  Constitutional: NAD, sitting up in bed  Neck: Supple, non tender  Respiratory: expiratory wheeze in right upper lobe, otherwise clear with no ronchi or crackles noted   Cardiovascular: RRR no murmurs, rubs or gallops noted.   Extremities: pitting edema with tenderness on palpation to mid-shin bilaterally   Vascular: 2+ pulses in distal extremities  Neurological: alert and oriented to person, place and time                                   11.3   11.0  )-----------( 171      ( 02 Jul 2018 17:51 )             34.7     07-03    134<L>  |  97  |  24<H>  ----------------------------<  303<H>  4.9   |  24  |  1.06    Ca    9.0      03 Jul 2018 07:21  Phos  3.6     07-03  Mg     2.5     07-03    TPro  7.0  /  Alb  3.8  /  TBili  0.4  /  DBili  x   /  AST  15  /  ALT  14  /  AlkPhos  106  07-02      < from: Xray Chest 2 Views PA/Lat (07.02.18 @ 19:12) >  EXAM:  XR CHEST PA LAT 2V                            PROCEDURE DATE:  07/02/2018    INTERPRETATION:  HISTORY: Shortness of breath.    TECHNIQUE: Frontal and lateral chest x-ray    COMPARISON: Chest x-ray from January 23, 2018. CT chest fromMarch 9, 2018.    FINDINGS:    Left anterior chest wall AICD.  No focal consolidation.  There is no pneumothorax. There are no pleural effusions.   The cardiomediastinal silhouette is unremarkable.    IMPRESSION:   Clear lungs.     TARSHA RODRÍGUEZ M.D., RADIOLOGY RESIDENT  This document has been electronically signed.  MAMTA GONZALEZ M.D., ATTENDING RADIOLOGIST  This document has been electronically signed. Jul  3 2018  8:25AM    < end of copied text > Subjective:   72 YO woman with PMH of asthma with frequent exacerbations, A-flutter on Eliquis, HFrEF s/p AICD, IDDM2, and HTN presenting with increasing SOB.    Overnight patient had a "coughing fit" and felt like she "could not breathe" after cleaning her room as she reports she is allergic to the cleaning product used. Otherwise no acute events overnight. She feels well this morning with improvement in her shortness of breath. She continues to cough intermittently with production of yellow sputum. She slept well overnight. She reports tender leg swelling bilaterally that has been decreasing over the past few days. She denies fever, HA, chest pain, palpitations, dysuria, increased urinary frequency.     REVIEW OF SYSTEMS  General: no weakness, fevers, chills	  Respiratory and Thorax: +cough, +SOB  Cardiovascular:	no chest pain or palpitations  Gastrointestinal:	no abd pain, nausea, vomiting, diarrhea or constipation  Genitourinary: no dysuria or increased frequency  Musculoskeletal:	 + R sided back pain, chronic  Neurological: no dizziness or numbness      Objective:   Vital Signs Last 24 Hrs  T(C): 36.6 (03 Jul 2018 07:17), Max: 37 (02 Jul 2018 21:51)  T(F): 97.8 (03 Jul 2018 07:17), Max: 98.6 (02 Jul 2018 21:51)  HR: 80 (03 Jul 2018 07:17) (79 - 100)  BP: 165/75 (03 Jul 2018 07:17) (110/70 - 165/75)  BP(mean): --  RR: 18 (03 Jul 2018 07:17) (16 - 18)  SpO2: 96% (03 Jul 2018 05:29) (96% - 98%)    PHYSICAL EXAM:  Constitutional: NAD, sitting up in bed  Neck: Supple, non tender  Respiratory: expiratory wheeze in right upper lobe, otherwise clear with no ronchi or crackles noted   Cardiovascular: RRR no murmurs, rubs or gallops noted.   Extremities: pitting edema with tenderness on palpation to mid-shin bilaterally   Vascular: 2+ pulses in distal extremities  Neurological: alert and oriented to person, place and time                                   11.3   11.0  )-----------( 171      ( 02 Jul 2018 17:51 )             34.7     07-03    134<L>  |  97  |  24<H>  ----------------------------<  303<H>  4.9   |  24  |  1.06    Ca    9.0      03 Jul 2018 07:21  Phos  3.6     07-03  Mg     2.5     07-03    TPro  7.0  /  Alb  3.8  /  TBili  0.4  /  DBili  x   /  AST  15  /  ALT  14  /  AlkPhos  106  07-02      < from: Xray Chest 2 Views PA/Lat (07.02.18 @ 19:12) >  EXAM:  XR CHEST PA LAT 2V                            PROCEDURE DATE:  07/02/2018    INTERPRETATION:  HISTORY: Shortness of breath.    TECHNIQUE: Frontal and lateral chest x-ray    COMPARISON: Chest x-ray from January 23, 2018. CT chest fromMarch 9, 2018.    FINDINGS:    Left anterior chest wall AICD.  No focal consolidation.  There is no pneumothorax. There are no pleural effusions.   The cardiomediastinal silhouette is unremarkable.    IMPRESSION:   Clear lungs.     TARSHA RODRÍGUEZ M.D., RADIOLOGY RESIDENT  This document has been electronically signed.  MAMTA GONZALEZ M.D., ATTENDING RADIOLOGIST  This document has been electronically signed. Jul  3 2018  8:25AM    < end of copied text > Subjective:   74 YO woman with PMH of asthma with frequent exacerbations, A-flutter on Eliquis, HFrEF s/p AICD, IDDM2, and HTN presenting with increasing SOB.    Overnight patient had a "coughing fit" and felt like she "could not breathe" after cleaning her room as she reports she is allergic to the cleaning product used. Otherwise no acute events overnight. She feels well this morning with improvement in her shortness of breath. She continues to cough intermittently with production of yellow sputum. She slept well overnight. She reports tender leg swelling bilaterally that has been decreasing over the past few days. She denies fever, HA, chest pain, palpitations, dysuria, increased urinary frequency.     REVIEW OF SYSTEMS  General: no weakness, fevers, chills	  Respiratory and Thorax: +cough, +SOB  Cardiovascular:	no chest pain or palpitations  Gastrointestinal:	no abd pain, nausea, vomiting, diarrhea or constipation  Genitourinary: no dysuria or increased frequency  Musculoskeletal:	 + R sided back pain, chronic  Neurological: no dizziness or numbness      Objective:   Vital Signs Last 24 Hrs  T(C): 36.6 (03 Jul 2018 07:17), Max: 37 (02 Jul 2018 21:51)  T(F): 97.8 (03 Jul 2018 07:17), Max: 98.6 (02 Jul 2018 21:51)  HR: 80 (03 Jul 2018 07:17) (79 - 100)  BP: 165/75 (03 Jul 2018 07:17) (110/70 - 165/75)  BP(mean): --  RR: 18 (03 Jul 2018 07:17) (16 - 18)  SpO2: 96% (03 Jul 2018 05:29) (96% - 98%)    PHYSICAL EXAM:  Constitutional: NAD, sitting up in bed  Neck: Supple, non tender  Respiratory: expiratory wheeze in right upper lobe, otherwise clear with no ronchi or crackles noted   Cardiovascular: RRR no murmurs, rubs or gallops noted.   Abdomen: soft, NT, ND, + BS  Extremities: pitting edema with tenderness on palpation to mid-shin bilaterally   Vascular: 2+ pulses in distal extremities  Neurological: alert and oriented to person, place and time                                   11.3   11.0  )-----------( 171      ( 02 Jul 2018 17:51 )             34.7     07-03    134<L>  |  97  |  24<H>  ----------------------------<  303<H>  4.9   |  24  |  1.06    Ca    9.0      03 Jul 2018 07:21  Phos  3.6     07-03  Mg     2.5     07-03    TPro  7.0  /  Alb  3.8  /  TBili  0.4  /  DBili  x   /  AST  15  /  ALT  14  /  AlkPhos  106  07-02      < from: Xray Chest 2 Views PA/Lat (07.02.18 @ 19:12) >  EXAM:  XR CHEST PA LAT 2V                            PROCEDURE DATE:  07/02/2018    INTERPRETATION:  HISTORY: Shortness of breath.    TECHNIQUE: Frontal and lateral chest x-ray    COMPARISON: Chest x-ray from January 23, 2018. CT chest fromMarch 9, 2018.    FINDINGS:    Left anterior chest wall AICD.  No focal consolidation.  There is no pneumothorax. There are no pleural effusions.   The cardiomediastinal silhouette is unremarkable.    IMPRESSION:   Clear lungs.     TARSHA RODRÍGUEZ M.D., RADIOLOGY RESIDENT  This document has been electronically signed.  MAMTA GONZALEZ M.D., ATTENDING RADIOLOGIST  This document has been electronically signed. Jul  3 2018  8:25AM    < end of copied text >

## 2018-07-03 NOTE — CONSULT NOTE ADULT - PROBLEM SELECTOR RECOMMENDATION 9
- SOB resolved at the time of this eval. Resp function at baseline in the am. Sx likely secondary to asthma sx as patient has no significant weight gain (last discharge 192lb, on admission yesterday 194lb.) BNP 74 on admission. CXR clear. LE swelling chronic in nature per patient.  - C/w asthma management with steroids, duonebs standing and azithromycin.  - Currently has no supplemental O2 needs.

## 2018-07-03 NOTE — PROGRESS NOTE ADULT - PROBLEM SELECTOR PLAN 4
Patient is currently in NSR.  - c/w eliquis Above goal at this time  -c/w Enalapril 10 mg BID Above goal at this time. Will continue to monitor, can escalate regimen as needed  -c/w Enalapril 10 mg BID

## 2018-07-03 NOTE — CONSULT NOTE ADULT - SUBJECTIVE AND OBJECTIVE BOX
Patient is a 73y old  Female who presents with a chief complaint of asthma exacerbation (2018 22:41)    HPI:  72 y/o F w/ a PMH significant for asthma w/ frequent exacerbations, A-flutter on Eliquis, HFrEF s/p AICD, IDDM2, and HTN who presented to the ED w/ SOB that has been worsening over the past few days. Cardiology service consulted given concern for HF exacerbation. Patient noted that as the temperature was increasing over the past few days, she has also been having worsening SOB, which is a common issue for her when she gets an asthma exacerbation. She denies any sick contacts, and only recent travel was to [place] to see family over 2 weeks ago. She endorses a cough that has also been worsening, productive of yellow sputum. Denies fevers/chills, n/v, CP/palpitations. She did use her nebulizer and inhaler w/ minimal improvement in symptoms. Of note, patient has been intubated in the past for respiratory failure (), underwent tracheostomy and subsequently reversal.     Per chart review, patient was last admitted in 2018 for SILVA and elevated CE. Patient with echo findings c/w mod-severe LV segmental dysfunction. Cath reports clean coronaries with LVEDP 17. Patient was treated with diuresis lasix 40mg IV daily and discharged on po lasix bid, of which patient is compliant at home. Patient reported no high salt intake recently; baseline weight high 180s. Baseline walks < 1 block before getting sob and sleeps on 2 pillows at night, unchanged on admission.    PAST MEDICAL & SURGICAL HISTORY:  Vertigo  Atrial flutter  Diverticulitis  Cardiomyopathy  Kidney stone  Cardiac Pacemaker  HTN - Hypertension  Gout  Diabetes  Congestive Heart Failure  Asthma  S/P cholecystectomy  AICD (Automatic Cardioverter/Defibrillator) Present: inserted in Aug, 2008. Due for battery change in 1 month. ( [employer]) . Inserted by Dr Duffy    MEDICATIONS  (STANDING):  ALBUTerol/ipratropium for Nebulization 3 milliLiter(s) Nebulizer every 6 hours  apixaban 5 milliGRAM(s) Oral every 12 hours  aspirin enteric coated 81 milliGRAM(s) Oral daily  atorvastatin 20 milliGRAM(s) Oral at bedtime  azithromycin   Tablet 250 milliGRAM(s) Oral daily  buDESOnide 160 MICROgram(s)/formoterol 4.5 MICROgram(s) Inhaler 2 Puff(s) Inhalation two times a day  dextrose 50% Injectable 12.5 Gram(s) IV Push once  dextrose 50% Injectable 25 Gram(s) IV Push once  dextrose 50% Injectable 25 Gram(s) IV Push once  enalapril 10 milliGRAM(s) Oral two times a day  furosemide    Tablet 40 milliGRAM(s) Oral two times a day  insulin glargine Injectable (LANTUS) 11 Unit(s) SubCutaneous at bedtime  insulin lispro (HumaLOG) corrective regimen sliding scale   SubCutaneous three times a day before meals  insulin lispro (HumaLOG) corrective regimen sliding scale   SubCutaneous at bedtime  montelukast 10 milliGRAM(s) Oral daily  pantoprazole    Tablet 40 milliGRAM(s) Oral before breakfast  spironolactone 12.5 milliGRAM(s) Oral daily    MEDICATIONS  (PRN):  dextrose 40% Gel 15 Gram(s) Oral once PRN Blood Glucose LESS THAN 70 milliGRAM(s)/deciliter  glucagon  Injectable 1 milliGRAM(s) IntraMuscular once PRN Glucose LESS THAN 70 milligrams/deciliter    FAMILY HISTORY:  Family history of brain tumor (Mother)    REVIEW OF SYSTEMS:    CONSTITUTIONAL: No weakness, fevers or chills  EYES/ENT: No visual changes;  No vertigo or throat pain   NECK: No pain or stiffness  RESPIRATORY: SOB pta.  CARDIOVASCULAR: No chest pain or palpitations. Chronic LE swelling bilaterally.  GASTROINTESTINAL: No abdominal or epigastric pain. No nausea, vomiting, or hematemesis; No diarrhea or constipation. No melena or hematochezia.  GENITOURINARY: No dysuria, frequency or hematuria  NEUROLOGICAL: No numbness or weakness  SKIN: No itching, burning, rashes, or lesions   All other review of systems is negative unless indicated above.    T(C): 36.6 (18 @ 07:17), Max: 37 (18 @ 21:51)  HR: 80 (18 @ 07:17) (79 - 100)  BP: 165/75 (18 @ 07:17) (110/70 - 165/75)  RR: 18 (18 @ 07:17) (16 - 18)  SpO2: 97% (18 @ 08:38) (96% - 98%)  Wt(kg): --  GENERAL: NAD, well-developed  HEAD:  Atraumatic, Normocephalic  EYES: EOMI, PERRLA, conjunctiva and sclera clear  NECK: Supple, No JVD appreciated in sitting position  CHEST/LUNG: Clear to auscultation bilaterally; No crackles  HEART: Regular rate and rhythm; No murmurs.  ABDOMEN: Soft, Nontender, Nondistended; Bowel sounds present  EXTREMITIES:  1+ pitting edema in LE up to upper calf bilaterally.  PSYCH: AAOx3  NEUROLOGY: non-focal  SKIN: No rashes or lesions    ECG:    I&O's Detail    2018 07:01  -  2018 13:34  --------------------------------------------------------  IN:    Oral Fluid: 360 mL  Total IN: 360 mL    OUT:  Total OUT: 0 mL    Total NET: 360 mL    LABS:                        11.2   10.30 )-----------( 187      ( 2018 09:54 )             34.8     07-03    134<L>  |  97  |  24<H>  ----------------------------<  303<H>  4.9   |  24  |  1.06    Ca    9.0      2018 07:21  Phos  3.6     07-03  Mg     2.5     07-03    TPro  7.0  /  Alb  3.8  /  TBili  0.4  /  DBili  x   /  AST  15  /  ALT  14  /  AlkPhos  106  07-02    CARDIAC MARKERS ( 2018 17:51 )  x     / x     / 128 U/L / x     / 4.0 ng/mL      PT/INR - ( 2018 17:51 )   PT: 13.9 sec;   INR: 1.27 ratio         PTT - ( 2018 17:51 )  PTT:31.9 sec  Urinalysis Basic - ( 2018 20:52 )    Color: Yellow / Appearance: SL Turbid / S.024 / pH: x  Gluc: x / Ketone: Negative  / Bili: Negative / Urobili: Negative   Blood: x / Protein: Trace / Nitrite: Negative   Leuk Esterase: Large / RBC: 25-50 /HPF / WBC >50 /HPF   Sq Epi: x / Non Sq Epi: Occasional /HPF / Bacteria: Many /HPF      I&O's Summary    2018 07:01  -  2018 13:34  --------------------------------------------------------  IN: 360 mL / OUT: 0 mL / NET: 360 mL      BNPSerum Pro-Brain Natriuretic Peptide: 74 pg/mL ( @ 17:51)    RADIOLOGY & ADDITIONAL STUDIES: CXR clear lungs.

## 2018-07-03 NOTE — PROGRESS NOTE ADULT - ATTENDING COMMENTS
Agree.  Seen and examined this AM, appear quite good w/ out O2 (97% on RA), no wheezing or crackles. At baseline weight 186-187 lb, adherent w/ diuretic regimen, story c/w asthma exacerbation. Agree w/ management above, decrease pred tomorrow, tolerated lower pred in past and now w/ hyperglycemia. Tighter glu control., will follow steroid hyperglycemia protcol. Can d/c ASA while on eliquis as per Dr. Mohr, d/w him today- greatly appreciate his input. Protonix added. ATC duonebs. Likely d/c in next 48 hrs w/ continued improvement. Agree.  Seen and examined this AM, appear quite well w/ out O2 (97% on RA), no wheezing or crackles. At baseline weight 186-187 lb, adherent w/ diuretic regimen, story c/w asthma exacerbation. Agree w/ management above, decrease pred tomorrow, tolerated lower pred in past and now w/ hyperglycemia. Tighter glu control., will follow steroid hyperglycemia protcol. Can d/c ASA while on eliquis as per Dr. Mohr, d/w him today- greatly appreciate his input. Protonix added. ATC duonebs. Likely d/c in next 48 hrs w/ continued improvement.

## 2018-07-03 NOTE — PROGRESS NOTE ADULT - PROBLEM SELECTOR PLAN 2
Patient takes 8U lantus qhs at home, and is on 0.5 mg prandin TID AC.   - A1c in the AM  - c/w lantus 8U qhs and ISS as patient on steroids Unknown control with longterm insulin, A1c pending  -will adjust Lantus to 11U qhs, 40% increase while on steroids  -continue to adjust insulin regimen

## 2018-07-03 NOTE — PROGRESS NOTE ADULT - PROBLEM SELECTOR PLAN 3
BP's wnl at this time.  - c/w enalapril 10 mg BID No acute exacerbation, appears euvolemic on exam  -continue with home regimen:  Lasix 40mg BID, Spironolactone 12.5 mg daily

## 2018-07-03 NOTE — PROGRESS NOTE ADULT - PROBLEM SELECTOR PLAN 5
S/p AICD. Euvolemic on exam.  - c/w lasix 40 mg BID, spironolactone 12.5 mg daily Patient is currently in NSR.  - c/w eliquis Patient is currently in NSR  -c/w Eliquis for anticoagulation

## 2018-07-03 NOTE — PROGRESS NOTE ADULT - ASSESSMENT
74 YO woman with PMH of asthma with frequent exacerbations, A-flutter on Eliquis, HFrEF s/p AICD, IDDM2, and HTN presenting with increasing SOB over the past few days. This morning reports improvement in shortness of breath with continual cough.

## 2018-07-03 NOTE — PROGRESS NOTE ADULT - PROBLEM SELECTOR PLAN 6
DVT ppx: on Alonzo Nicole, PGY-2  Internal Medicine  Pager# [phone] -DVT PPX: Eliquis  -DIET: CC/DASH  -DISPO: PT Mario

## 2018-07-03 NOTE — PROGRESS NOTE ADULT - PROBLEM SELECTOR PLAN 2
patient takes 8U lantus qhs at home and .5mg prandin TID  - continue lantus 8U qhs   - ISS while in hospital as patient is on steroids patient takes 8U lantus qhs at home and .5mg prandin TID. blood glucose increased on steroids  - increase lantus from 8->11U qhs   - moderate ISS while in hospital as patient is on steroids

## 2018-07-04 LAB
ANION GAP SERPL CALC-SCNC: 14 MMOL/L — SIGNIFICANT CHANGE UP (ref 5–17)
BASOPHILS # BLD AUTO: 0.01 K/UL — SIGNIFICANT CHANGE UP (ref 0–0.2)
BASOPHILS NFR BLD AUTO: 0.1 % — SIGNIFICANT CHANGE UP (ref 0–2)
BUN SERPL-MCNC: 32 MG/DL — HIGH (ref 7–23)
CALCIUM SERPL-MCNC: 9 MG/DL — SIGNIFICANT CHANGE UP (ref 8.4–10.5)
CHLORIDE SERPL-SCNC: 101 MMOL/L — SIGNIFICANT CHANGE UP (ref 96–108)
CO2 SERPL-SCNC: 25 MMOL/L — SIGNIFICANT CHANGE UP (ref 22–31)
CREAT SERPL-MCNC: 1.16 MG/DL — SIGNIFICANT CHANGE UP (ref 0.5–1.3)
EOSINOPHIL # BLD AUTO: 0.03 K/UL — SIGNIFICANT CHANGE UP (ref 0–0.5)
EOSINOPHIL NFR BLD AUTO: 0.3 % — SIGNIFICANT CHANGE UP (ref 0–6)
GLUCOSE BLDC GLUCOMTR-MCNC: 169 MG/DL — HIGH (ref 70–99)
GLUCOSE BLDC GLUCOMTR-MCNC: 177 MG/DL — HIGH (ref 70–99)
GLUCOSE BLDC GLUCOMTR-MCNC: 232 MG/DL — HIGH (ref 70–99)
GLUCOSE BLDC GLUCOMTR-MCNC: 355 MG/DL — HIGH (ref 70–99)
GLUCOSE SERPL-MCNC: 146 MG/DL — HIGH (ref 70–99)
HBA1C BLD-MCNC: 7.2 % — HIGH (ref 4–5.6)
HCT VFR BLD CALC: 33.4 % — LOW (ref 34.5–45)
HGB BLD-MCNC: 10.9 G/DL — LOW (ref 11.5–15.5)
IMM GRANULOCYTES NFR BLD AUTO: 0.3 % — SIGNIFICANT CHANGE UP (ref 0–1.5)
LYMPHOCYTES # BLD AUTO: 1.85 K/UL — SIGNIFICANT CHANGE UP (ref 1–3.3)
LYMPHOCYTES # BLD AUTO: 15.8 % — SIGNIFICANT CHANGE UP (ref 13–44)
MAGNESIUM SERPL-MCNC: 2 MG/DL — SIGNIFICANT CHANGE UP (ref 1.6–2.6)
MCHC RBC-ENTMCNC: 30.3 PG — SIGNIFICANT CHANGE UP (ref 27–34)
MCHC RBC-ENTMCNC: 32.6 GM/DL — SIGNIFICANT CHANGE UP (ref 32–36)
MCV RBC AUTO: 92.8 FL — SIGNIFICANT CHANGE UP (ref 80–100)
MONOCYTES # BLD AUTO: 0.98 K/UL — HIGH (ref 0–0.9)
MONOCYTES NFR BLD AUTO: 8.4 % — SIGNIFICANT CHANGE UP (ref 2–14)
NEUTROPHILS # BLD AUTO: 8.83 K/UL — HIGH (ref 1.8–7.4)
NEUTROPHILS NFR BLD AUTO: 75.1 % — SIGNIFICANT CHANGE UP (ref 43–77)
PHOSPHATE SERPL-MCNC: 4.1 MG/DL — SIGNIFICANT CHANGE UP (ref 2.5–4.5)
PLATELET # BLD AUTO: 187 K/UL — SIGNIFICANT CHANGE UP (ref 150–400)
POTASSIUM SERPL-MCNC: 4.1 MMOL/L — SIGNIFICANT CHANGE UP (ref 3.5–5.3)
POTASSIUM SERPL-SCNC: 4.1 MMOL/L — SIGNIFICANT CHANGE UP (ref 3.5–5.3)
RBC # BLD: 3.6 M/UL — LOW (ref 3.8–5.2)
RBC # FLD: 14.6 % — HIGH (ref 10.3–14.5)
SODIUM SERPL-SCNC: 140 MMOL/L — SIGNIFICANT CHANGE UP (ref 135–145)
WBC # BLD: 11.73 K/UL — HIGH (ref 3.8–10.5)
WBC # FLD AUTO: 11.73 K/UL — HIGH (ref 3.8–10.5)

## 2018-07-04 PROCEDURE — 99232 SBSQ HOSP IP/OBS MODERATE 35: CPT

## 2018-07-04 RX ORDER — ACETAMINOPHEN 500 MG
650 TABLET ORAL EVERY 6 HOURS
Qty: 0 | Refills: 0 | Status: DISCONTINUED | OUTPATIENT
Start: 2018-07-04 | End: 2018-07-05

## 2018-07-04 RX ORDER — ACETAMINOPHEN 500 MG
650 TABLET ORAL ONCE
Qty: 0 | Refills: 0 | Status: COMPLETED | OUTPATIENT
Start: 2018-07-04 | End: 2018-07-04

## 2018-07-04 RX ORDER — INSULIN LISPRO 100/ML
3 VIAL (ML) SUBCUTANEOUS
Qty: 0 | Refills: 0 | Status: DISCONTINUED | OUTPATIENT
Start: 2018-07-04 | End: 2018-07-05

## 2018-07-04 RX ORDER — INSULIN GLARGINE 100 [IU]/ML
12 INJECTION, SOLUTION SUBCUTANEOUS AT BEDTIME
Qty: 0 | Refills: 0 | Status: DISCONTINUED | OUTPATIENT
Start: 2018-07-04 | End: 2018-07-05

## 2018-07-04 RX ADMIN — AZITHROMYCIN 250 MILLIGRAM(S): 500 TABLET, FILM COATED ORAL at 12:13

## 2018-07-04 RX ADMIN — Medication 100 MILLIGRAM(S): at 17:30

## 2018-07-04 RX ADMIN — APIXABAN 5 MILLIGRAM(S): 2.5 TABLET, FILM COATED ORAL at 06:02

## 2018-07-04 RX ADMIN — ATORVASTATIN CALCIUM 20 MILLIGRAM(S): 80 TABLET, FILM COATED ORAL at 22:11

## 2018-07-04 RX ADMIN — INSULIN GLARGINE 12 UNIT(S): 100 INJECTION, SOLUTION SUBCUTANEOUS at 22:11

## 2018-07-04 RX ADMIN — Medication 40 MILLIGRAM(S): at 06:02

## 2018-07-04 RX ADMIN — SPIRONOLACTONE 12.5 MILLIGRAM(S): 25 TABLET, FILM COATED ORAL at 06:01

## 2018-07-04 RX ADMIN — Medication 4: at 17:16

## 2018-07-04 RX ADMIN — Medication 10 MILLIGRAM(S): at 17:15

## 2018-07-04 RX ADMIN — PANTOPRAZOLE SODIUM 40 MILLIGRAM(S): 20 TABLET, DELAYED RELEASE ORAL at 06:02

## 2018-07-04 RX ADMIN — BUDESONIDE AND FORMOTEROL FUMARATE DIHYDRATE 2 PUFF(S): 160; 4.5 AEROSOL RESPIRATORY (INHALATION) at 17:17

## 2018-07-04 RX ADMIN — Medication 3 UNIT(S): at 17:16

## 2018-07-04 RX ADMIN — Medication 30 MILLIGRAM(S): at 06:02

## 2018-07-04 RX ADMIN — APIXABAN 5 MILLIGRAM(S): 2.5 TABLET, FILM COATED ORAL at 17:14

## 2018-07-04 RX ADMIN — Medication 2: at 08:33

## 2018-07-04 RX ADMIN — Medication 10 MILLIGRAM(S): at 06:02

## 2018-07-04 RX ADMIN — Medication 650 MILLIGRAM(S): at 01:00

## 2018-07-04 RX ADMIN — Medication 10: at 12:12

## 2018-07-04 RX ADMIN — Medication 650 MILLIGRAM(S): at 00:08

## 2018-07-04 RX ADMIN — Medication 40 MILLIGRAM(S): at 17:14

## 2018-07-04 RX ADMIN — MONTELUKAST 10 MILLIGRAM(S): 4 TABLET, CHEWABLE ORAL at 12:13

## 2018-07-04 RX ADMIN — BUDESONIDE AND FORMOTEROL FUMARATE DIHYDRATE 2 PUFF(S): 160; 4.5 AEROSOL RESPIRATORY (INHALATION) at 06:02

## 2018-07-04 NOTE — PROGRESS NOTE ADULT - PROBLEM SELECTOR PLAN 3
No acute exacerbation, appears euvolemic on exam  -continue with home regimen:  Lasix 40mg BID, Spironolactone 12.5 mg daily  -recent Cardiac Cath WNL, discussed ASA d/c with Dr. Hobson  -appreciate HF recs

## 2018-07-04 NOTE — PROGRESS NOTE ADULT - PROBLEM SELECTOR PLAN 2
Unknown control with longterm insulin, A1c pending  -will adjust Lantus to 11U qhs, 40% increase while on steroids  -AM FS at goal, will titrate insulin as needed  -FS/ISS qac/qhs

## 2018-07-04 NOTE — PROGRESS NOTE ADULT - ASSESSMENT
Ms. Dolan is a 72yo Woman w/ PMH of Asthma (frequent exacerbations, h/o intubation in 2016), AFlutter (on Eliquis), HFrEF (s/p AICD), T2DM (on insulin), and HTN p/w SOB concerning for acute asthma exacerbation. 74yo Woman w/ PMH of Asthma (frequent exacerbations, h/o intubation in 2016), AFlutter (on Eliquis), HFrEF (s/p AICD), T2DM (on insulin), and HTN p/w SOB concerning for acute asthma exacerbation.

## 2018-07-04 NOTE — PROGRESS NOTE ADULT - PROBLEM SELECTOR PLAN 1
Acute Exacerbation with history significant for intubation/trach (2016), now symptomatically improved w/o use of supplemental O2  -de-escalate DuoNebs to q8h standing  -c/w Prednisone taper: now 20mg for 3 days. Patient has done well with 20mg during acute exacerbations in the past, caution however given h/o intubation  -c/w Azithro  mg daily for total 5 days  -c/w Symbicort, Singulair  -will hold Spiriva while on duonebs  -daily peak flow to evaluate progress

## 2018-07-04 NOTE — PROGRESS NOTE ADULT - SUBJECTIVE AND OBJECTIVE BOX
Patient is a 73y old  Female who presents with a chief complaint of asthma exacerbation (2018 22:41)    SUBJECTIVE / OVERNIGHT EVENTS: No acute events overnight. Patient tolerating PO, having regular BMs, and OOB/ambulating within room without difficulty. Denies fever, chills, CP, SOB, N/V/D. Walking laps on the floor, no supplemental O2.    REVIEW OF SYSTEMS:  CONSTITUTIONAL: No weakness, fevers or chills  EYES/ENT: No visual changes;  No vertigo or throat pain   NECK: No pain or stiffness  RESPIRATORY: No cough, wheezing, hemoptysis; No shortness of breath  CARDIOVASCULAR: No chest pain or palpitations  GASTROINTESTINAL: No abdominal pain, nausea, vomiting, or diarrhea. No melena or hematochezia.  GENITOURINARY: No dysuria, frequency or hematuria  NEUROLOGICAL: No numbness or weakness  SKIN: No itching, burning, rashes, or lesions   All other review of systems is negative unless indicated above.    MEDICATIONS  (STANDING):  apixaban 5 milliGRAM(s) Oral every 12 hours  atorvastatin 20 milliGRAM(s) Oral at bedtime  azithromycin   Tablet 250 milliGRAM(s) Oral daily  buDESOnide 160 MICROgram(s)/formoterol 4.5 MICROgram(s) Inhaler 2 Puff(s) Inhalation two times a day  dextrose 50% Injectable 12.5 Gram(s) IV Push once  dextrose 50% Injectable 25 Gram(s) IV Push once  dextrose 50% Injectable 25 Gram(s) IV Push once  enalapril 10 milliGRAM(s) Oral two times a day  furosemide    Tablet 40 milliGRAM(s) Oral two times a day  insulin glargine Injectable (LANTUS) 11 Unit(s) SubCutaneous at bedtime  insulin lispro (HumaLOG) corrective regimen sliding scale   SubCutaneous three times a day before meals  insulin lispro (HumaLOG) corrective regimen sliding scale   SubCutaneous at bedtime  montelukast 10 milliGRAM(s) Oral daily  pantoprazole    Tablet 40 milliGRAM(s) Oral before breakfast  spironolactone 12.5 milliGRAM(s) Oral daily    MEDICATIONS  (PRN):  acetaminophen   Tablet. 650 milliGRAM(s) Oral every 6 hours PRN Mild and Moderate Pain (1 - 6)  dextrose 40% Gel 15 Gram(s) Oral once PRN Blood Glucose LESS THAN 70 milliGRAM(s)/deciliter  glucagon  Injectable 1 milliGRAM(s) IntraMuscular once PRN Glucose LESS THAN 70 milligrams/deciliter      T(C): 36.4 (18 @ 08:33), Max: 36.6 (18 @ 15:30)  HR: 72 (18 @ 08:33) (72 - 85)  BP: 130/73 (18 @ 08:33) (119/73 - 151/78)  RR: 18 (18 @ 08:33) (18 - 18)  SpO2: 98% (18 @ 08:33) (98% - 98%)    CAPILLARY BLOOD GLUCOSE      POCT Blood Glucose.: 177 mg/dL (2018 08:31)  POCT Blood Glucose.: 227 mg/dL (2018 21:14)  POCT Blood Glucose.: 185 mg/dL (2018 17:03)  POCT Blood Glucose.: 297 mg/dL (2018 12:34)    I&O's Summary    2018 07:01  -  2018 07:00  --------------------------------------------------------  IN: 680 mL / OUT: 0 mL / NET: 680 mL        GENERAL: No acute distress, well-developed  HEAD:  Atraumatic, Normocephalic  ENT: EOMI, PERRLA, minimal JVD, moist mucosa  CHEST/LUNG: faint wheeze on R side, otherwise clear to auscultation  BACK: No spinal tenderness  HEART: Regular rate and rhythm; No murmurs, rubs, or gallops  ABDOMEN: Soft, Nontender, Nondistended; Bowel sounds present  EXTREMITIES: Trace LE edema. No clubbing, cyanosis  PSYCH: Nl behavior, nl affect  NEUROLOGY: AAOx3, non-focal, cranial nerves intact  SKIN: Normal color, No rashes or lesions    LABS:                        11.2   10.30 )-----------( 187      ( 2018 09:54 )             34.8     140  |  101  |  32<H>  ----------------------------<  146<H>  4.1   |  25  |  1.16    Ca    9.0      2018 07:24  Phos  4.1     07-  Mg     2.0     07-04    TPro  7.0  /  Alb  3.8  /  TBili  0.4  /  DBili  x   /  AST  15  /  ALT  14  /  AlkPhos  106  07-02  PT/INR - ( 2018 17:51 )   PT: 13.9 sec;   INR: 1.27 ratio    PTT - ( 2018 17:51 )  PTT:31.9 sec    CARDIAC MARKERS ( 2018 17:51 )  x     / x     / 128 U/L / x     / 4.0 ng/mL      Urinalysis Basic - ( 2018 20:52 )  Color: Yellow / Appearance: SL Turbid / S.024 / pH: x  Gluc: x / Ketone: Negative  / Bili: Negative / Urobili: Negative   Blood: x / Protein: Trace / Nitrite: Negative   Leuk Esterase: Large / RBC: 25-50 /HPF / WBC >50 /HPF   Sq Epi: x / Non Sq Epi: Occasional /HPF / Bacteria: Many /HPF      RADIOLOGY & ADDITIONAL TESTS:  Imaging Personally Reviewed: CXR  Consultant(s) Notes Reviewed: Heart Failure  Care Discussed with Consultants/Other Providers:

## 2018-07-05 ENCOUNTER — TRANSCRIPTION ENCOUNTER (OUTPATIENT)
Age: 73
End: 2018-07-05

## 2018-07-05 VITALS
SYSTOLIC BLOOD PRESSURE: 114 MMHG | OXYGEN SATURATION: 95 % | TEMPERATURE: 98 F | DIASTOLIC BLOOD PRESSURE: 76 MMHG | HEART RATE: 80 BPM | RESPIRATION RATE: 18 BRPM

## 2018-07-05 LAB — GLUCOSE BLDC GLUCOMTR-MCNC: 129 MG/DL — HIGH (ref 70–99)

## 2018-07-05 PROCEDURE — 82435 ASSAY OF BLOOD CHLORIDE: CPT

## 2018-07-05 PROCEDURE — 81001 URINALYSIS AUTO W/SCOPE: CPT

## 2018-07-05 PROCEDURE — 99239 HOSP IP/OBS DSCHRG MGMT >30: CPT

## 2018-07-05 PROCEDURE — 94640 AIRWAY INHALATION TREATMENT: CPT

## 2018-07-05 PROCEDURE — 83880 ASSAY OF NATRIURETIC PEPTIDE: CPT

## 2018-07-05 PROCEDURE — 71046 X-RAY EXAM CHEST 2 VIEWS: CPT

## 2018-07-05 PROCEDURE — 85027 COMPLETE CBC AUTOMATED: CPT

## 2018-07-05 PROCEDURE — 87486 CHLMYD PNEUM DNA AMP PROBE: CPT

## 2018-07-05 PROCEDURE — 82803 BLOOD GASES ANY COMBINATION: CPT

## 2018-07-05 PROCEDURE — 82550 ASSAY OF CK (CPK): CPT

## 2018-07-05 PROCEDURE — 85014 HEMATOCRIT: CPT

## 2018-07-05 PROCEDURE — 96374 THER/PROPH/DIAG INJ IV PUSH: CPT

## 2018-07-05 PROCEDURE — 87581 M.PNEUMON DNA AMP PROBE: CPT

## 2018-07-05 PROCEDURE — 80053 COMPREHEN METABOLIC PANEL: CPT

## 2018-07-05 PROCEDURE — 93005 ELECTROCARDIOGRAM TRACING: CPT

## 2018-07-05 PROCEDURE — 84100 ASSAY OF PHOSPHORUS: CPT

## 2018-07-05 PROCEDURE — 84484 ASSAY OF TROPONIN QUANT: CPT

## 2018-07-05 PROCEDURE — 84295 ASSAY OF SERUM SODIUM: CPT

## 2018-07-05 PROCEDURE — 87633 RESP VIRUS 12-25 TARGETS: CPT

## 2018-07-05 PROCEDURE — G0378: CPT

## 2018-07-05 PROCEDURE — 80048 BASIC METABOLIC PNL TOTAL CA: CPT

## 2018-07-05 PROCEDURE — 84443 ASSAY THYROID STIM HORMONE: CPT

## 2018-07-05 PROCEDURE — 99285 EMERGENCY DEPT VISIT HI MDM: CPT | Mod: 25

## 2018-07-05 PROCEDURE — 83605 ASSAY OF LACTIC ACID: CPT

## 2018-07-05 PROCEDURE — 82553 CREATINE MB FRACTION: CPT

## 2018-07-05 PROCEDURE — 83036 HEMOGLOBIN GLYCOSYLATED A1C: CPT

## 2018-07-05 PROCEDURE — 84132 ASSAY OF SERUM POTASSIUM: CPT

## 2018-07-05 PROCEDURE — 82947 ASSAY GLUCOSE BLOOD QUANT: CPT

## 2018-07-05 PROCEDURE — 82330 ASSAY OF CALCIUM: CPT

## 2018-07-05 PROCEDURE — 82962 GLUCOSE BLOOD TEST: CPT

## 2018-07-05 PROCEDURE — 87798 DETECT AGENT NOS DNA AMP: CPT

## 2018-07-05 PROCEDURE — 85610 PROTHROMBIN TIME: CPT

## 2018-07-05 PROCEDURE — 83735 ASSAY OF MAGNESIUM: CPT

## 2018-07-05 PROCEDURE — 85730 THROMBOPLASTIN TIME PARTIAL: CPT

## 2018-07-05 RX ORDER — ENOXAPARIN SODIUM 100 MG/ML
8 INJECTION SUBCUTANEOUS
Qty: 0 | Refills: 0 | COMMUNITY

## 2018-07-05 RX ORDER — ASPIRIN/CALCIUM CARB/MAGNESIUM 324 MG
1 TABLET ORAL
Qty: 0 | Refills: 0 | COMMUNITY

## 2018-07-05 RX ORDER — PANTOPRAZOLE SODIUM 20 MG/1
1 TABLET, DELAYED RELEASE ORAL
Qty: 5 | Refills: 0 | OUTPATIENT
Start: 2018-07-05 | End: 2018-07-09

## 2018-07-05 RX ADMIN — BUDESONIDE AND FORMOTEROL FUMARATE DIHYDRATE 2 PUFF(S): 160; 4.5 AEROSOL RESPIRATORY (INHALATION) at 05:42

## 2018-07-05 RX ADMIN — SPIRONOLACTONE 12.5 MILLIGRAM(S): 25 TABLET, FILM COATED ORAL at 05:41

## 2018-07-05 RX ADMIN — PANTOPRAZOLE SODIUM 40 MILLIGRAM(S): 20 TABLET, DELAYED RELEASE ORAL at 05:42

## 2018-07-05 RX ADMIN — Medication 10 MILLIGRAM(S): at 05:41

## 2018-07-05 RX ADMIN — Medication 40 MILLIGRAM(S): at 05:41

## 2018-07-05 RX ADMIN — Medication 20 MILLIGRAM(S): at 05:41

## 2018-07-05 RX ADMIN — APIXABAN 5 MILLIGRAM(S): 2.5 TABLET, FILM COATED ORAL at 05:41

## 2018-07-05 RX ADMIN — Medication 3 UNIT(S): at 08:34

## 2018-07-05 NOTE — DISCHARGE NOTE ADULT - PLAN OF CARE
Please follow-up with your Pulmonologist within 1 week You had an asthma exacerbation due to the heat. You recovered quickly and well. Please finish the Prednisone course and see your Lung doctor within one week. Your sugars were elevated due to the steroids. While you are taking the Prednisone, please continue with the increased dose of Lantus. When the steroids finish, you can return to your usual dose. Please follow-up with your PMD for further titration of your insulin.

## 2018-07-05 NOTE — PROGRESS NOTE ADULT - ATTENDING COMMENTS
Agree.  Seen and examined this AM, resting comfortably on RA w/ mild end expiratory wheeze, otherwise lungs CTAB and she's feeling well w/ nearly resolved cough. Small leukocytosis from steroids.  To complete 5 days of steroids (difficult for her to take and rapid improvement w/ good data for 5 day course) + 5 days azithro.  F/u w/ pulm early next week, avoidance of triggers including heat. She has AC and her daughter could bring her groceries.  Strict RTC precautions discussed explicitly by bedside. All questions and concerns addressed.  35 minutes spent coordinating discharge, see sum for details.

## 2018-07-05 NOTE — PROGRESS NOTE ADULT - SUBJECTIVE AND OBJECTIVE BOX
Patient is a 73y old  Female who presents with a chief complaint of asthma exacerbation (02 Jul 2018 22:41)      SUBJECTIVE / OVERNIGHT EVENTS: No acute events overnight. Patient tolerating PO, having regular BMs, and OOB/ambulating within room without difficulty. Denies fever, chills, CP, SOB, N/V/D.    REVIEW OF SYSTEMS:  CONSTITUTIONAL: No weakness, fevers or chills  EYES/ENT: No visual changes;  No vertigo or throat pain   NECK: No pain or stiffness  RESPIRATORY: No cough, wheezing, hemoptysis; No shortness of breath  CARDIOVASCULAR: No chest pain or palpitations  GASTROINTESTINAL: No abdominal pain, nausea, vomiting, or diarrhea. No melena or hematochezia.  GENITOURINARY: No dysuria, frequency or hematuria  NEUROLOGICAL: No numbness or weakness  SKIN: No itching, burning, rashes, or lesions   All other review of systems is negative unless indicated above.    MEDICATIONS  (STANDING):  apixaban 5 milliGRAM(s) Oral every 12 hours  atorvastatin 20 milliGRAM(s) Oral at bedtime  azithromycin   Tablet 250 milliGRAM(s) Oral daily  buDESOnide 160 MICROgram(s)/formoterol 4.5 MICROgram(s) Inhaler 2 Puff(s) Inhalation two times a day  dextrose 50% Injectable 12.5 Gram(s) IV Push once  dextrose 50% Injectable 25 Gram(s) IV Push once  dextrose 50% Injectable 25 Gram(s) IV Push once  enalapril 10 milliGRAM(s) Oral two times a day  furosemide    Tablet 40 milliGRAM(s) Oral two times a day  insulin glargine Injectable (LANTUS) 12 Unit(s) SubCutaneous at bedtime  insulin lispro (HumaLOG) corrective regimen sliding scale   SubCutaneous three times a day before meals  insulin lispro (HumaLOG) corrective regimen sliding scale   SubCutaneous at bedtime  insulin lispro Injectable (HumaLOG) 3 Unit(s) SubCutaneous three times a day before meals  montelukast 10 milliGRAM(s) Oral daily  pantoprazole    Tablet 40 milliGRAM(s) Oral before breakfast  predniSONE   Tablet 20 milliGRAM(s) Oral daily  spironolactone 12.5 milliGRAM(s) Oral daily    MEDICATIONS  (PRN):  acetaminophen   Tablet. 650 milliGRAM(s) Oral every 6 hours PRN Mild and Moderate Pain (1 - 6)  dextrose 40% Gel 15 Gram(s) Oral once PRN Blood Glucose LESS THAN 70 milliGRAM(s)/deciliter  glucagon  Injectable 1 milliGRAM(s) IntraMuscular once PRN Glucose LESS THAN 70 milligrams/deciliter      T(C): 36.6 (07-04-18 @ 23:26), Max: 36.6 (07-04-18 @ 23:26)  HR: 76 (07-05-18 @ 05:38) (71 - 84)  BP: 112/64 (07-05-18 @ 05:38) (112/64 - 129/71)  RR: 18 (07-04-18 @ 23:26) (18 - 18)  SpO2: 95% (07-04-18 @ 23:26) (95% - 96%)    CAPILLARY BLOOD GLUCOSE      POCT Blood Glucose.: 129 mg/dL (05 Jul 2018 08:05)  POCT Blood Glucose.: 169 mg/dL (04 Jul 2018 21:21)  POCT Blood Glucose.: 232 mg/dL (04 Jul 2018 17:10)  POCT Blood Glucose.: 355 mg/dL (04 Jul 2018 12:08)    I&O's Summary    04 Jul 2018 07:01  -  05 Jul 2018 07:00  --------------------------------------------------------  IN: 920 mL / OUT: 0 mL / NET: 920 mL        GENERAL: No acute distress, well-developed  HEAD:  Atraumatic, Normocephalic  ENT: EOMI, PERRLA, No JVD, moist mucosa  CHEST/LUNG: Clear to auscultation bilaterally  BACK: No spinal tenderness  HEART: Regular rate and rhythm; No murmurs, rubs, or gallops  ABDOMEN: Soft, Nontender, Nondistended; Bowel sounds present  EXTREMITIES:  No clubbing, cyanosis, or edema  PSYCH: Nl behavior, nl affect  NEUROLOGY: AAOx3, non-focal, cranial nerves intact  SKIN: Normal color, No rashes or lesions    LABS:                        10.9   11.73 )-----------( 187      ( 04 Jul 2018 10:35 )             33.4     07-04    140  |  101  |  32<H>  ----------------------------<  146<H>  4.1   |  25  |  1.16    Ca    9.0      04 Jul 2018 07:24  Phos  4.1     07-04  Mg     2.0     07-04              RADIOLOGY & ADDITIONAL TESTS:  Imaging Personally Reviewed:  Consultant(s) Notes Reviewed:    Care Discussed with Consultants/Other Providers: Patient is a 73y old  Female who presents with a chief complaint of asthma exacerbation (02 Jul 2018 22:41)      SUBJECTIVE / OVERNIGHT EVENTS: No acute events overnight. Patient tolerating PO, having regular BMs, and OOB/ambulating within room without difficulty. Denies fever, chills, CP, SOB, N/V/D.    REVIEW OF SYSTEMS:  CONSTITUTIONAL: No weakness, fevers or chills  EYES/ENT: No visual changes;  No vertigo or throat pain   NECK: No pain or stiffness  RESPIRATORY: No cough, wheezing, hemoptysis; No shortness of breath  CARDIOVASCULAR: No chest pain or palpitations  GASTROINTESTINAL: No abdominal pain, nausea, vomiting, or diarrhea. No melena or hematochezia.  GENITOURINARY: No dysuria, frequency or hematuria  NEUROLOGICAL: No numbness or weakness  SKIN: No itching, burning, rashes, or lesions   All other review of systems is negative unless indicated above.    MEDICATIONS  (STANDING):  apixaban 5 milliGRAM(s) Oral every 12 hours  atorvastatin 20 milliGRAM(s) Oral at bedtime  azithromycin   Tablet 250 milliGRAM(s) Oral daily  buDESOnide 160 MICROgram(s)/formoterol 4.5 MICROgram(s) Inhaler 2 Puff(s) Inhalation two times a day  dextrose 50% Injectable 12.5 Gram(s) IV Push once  dextrose 50% Injectable 25 Gram(s) IV Push once  dextrose 50% Injectable 25 Gram(s) IV Push once  enalapril 10 milliGRAM(s) Oral two times a day  furosemide    Tablet 40 milliGRAM(s) Oral two times a day  insulin glargine Injectable (LANTUS) 12 Unit(s) SubCutaneous at bedtime  insulin lispro (HumaLOG) corrective regimen sliding scale   SubCutaneous three times a day before meals  insulin lispro (HumaLOG) corrective regimen sliding scale   SubCutaneous at bedtime  insulin lispro Injectable (HumaLOG) 3 Unit(s) SubCutaneous three times a day before meals  montelukast 10 milliGRAM(s) Oral daily  pantoprazole    Tablet 40 milliGRAM(s) Oral before breakfast  predniSONE   Tablet 20 milliGRAM(s) Oral daily  spironolactone 12.5 milliGRAM(s) Oral daily    MEDICATIONS  (PRN):  acetaminophen   Tablet. 650 milliGRAM(s) Oral every 6 hours PRN Mild and Moderate Pain (1 - 6)  dextrose 40% Gel 15 Gram(s) Oral once PRN Blood Glucose LESS THAN 70 milliGRAM(s)/deciliter  glucagon  Injectable 1 milliGRAM(s) IntraMuscular once PRN Glucose LESS THAN 70 milligrams/deciliter      T(C): 36.6 (07-04-18 @ 23:26), Max: 36.6 (07-04-18 @ 23:26)  HR: 76 (07-05-18 @ 05:38) (71 - 84)  BP: 112/64 (07-05-18 @ 05:38) (112/64 - 129/71)  RR: 18 (07-04-18 @ 23:26) (18 - 18)  SpO2: 95% (07-04-18 @ 23:26) (95% - 96%)    CAPILLARY BLOOD GLUCOSE      POCT Blood Glucose.: 129 mg/dL (05 Jul 2018 08:05)  POCT Blood Glucose.: 169 mg/dL (04 Jul 2018 21:21)  POCT Blood Glucose.: 232 mg/dL (04 Jul 2018 17:10)  POCT Blood Glucose.: 355 mg/dL (04 Jul 2018 12:08)    I&O's Summary    04 Jul 2018 07:01  -  05 Jul 2018 07:00  --------------------------------------------------------  IN: 920 mL / OUT: 0 mL / NET: 920 mL        GENERAL: No acute distress, well-developed  HEAD:  Atraumatic, Normocephalic  ENT: EOMI, PERRLA, No JVD, moist mucosa  CHEST/LUNG: Clear to auscultation bilaterally, very faint end expiratory wheeze.  BACK: No spinal tenderness  HEART: Regular rate and rhythm; No murmurs, rubs, or gallops  ABDOMEN: Soft, Nontender, Nondistended; Bowel sounds present  EXTREMITIES:  No clubbing, cyanosis, or edema  PSYCH: Nl behavior, nl affect  NEUROLOGY: AAOx3, non-focal, cranial nerves intact  SKIN: Normal color, No rashes or lesions    LABS:                        10.9   11.73 )-----------( 187      ( 04 Jul 2018 10:35 )             33.4     07-04    140  |  101  |  32<H>  ----------------------------<  146<H>  4.1   |  25  |  1.16    Ca    9.0      04 Jul 2018 07:24  Phos  4.1     07-04  Mg     2.0     07-04              RADIOLOGY & ADDITIONAL TESTS:  Imaging Personally Reviewed:  Consultant(s) Notes Reviewed:    Care Discussed with Consultants/Other Providers:

## 2018-07-05 NOTE — PROGRESS NOTE ADULT - PROBLEM SELECTOR PROBLEM 1
Exacerbation of asthma, unspecified asthma severity, unspecified whether persistent

## 2018-07-05 NOTE — PROGRESS NOTE ADULT - ASSESSMENT
72yo Woman w/ PMH of Asthma (frequent exacerbations, h/o intubation in 2016), AFlutter (on Eliquis), HFrEF (s/p AICD), T2DM (on insulin), and HTN p/w SOB concerning for acute asthma exacerbation.

## 2018-07-05 NOTE — PROGRESS NOTE ADULT - PROBLEM SELECTOR PLAN 4
s/p AICD in 2008. Pt has follow up appointment in one month. On exam pt has bilateral LE pitting edema to mid shins but chronic in nature as per pt.   -c/w lasix 40mg BID  -c/w spironolactone 12.5mg daily  - appreciate heart failure recs

## 2018-07-05 NOTE — PROGRESS NOTE ADULT - PROBLEM SELECTOR PLAN 2
patient takes 8U lantus qhs at home and .5mg prandin TID. blood glucose increased on steroids  - increase lantus from 8->11U qhs   - moderate ISS while in hospital as patient is on steroids patient takes 8U lantus qhs at home and .5mg prandin TID. blood glucose increased on steroids  - increase lantus from 11->12U qhs   - moderate ISS while in hospital as patient is on steroids

## 2018-07-05 NOTE — DISCHARGE NOTE ADULT - CARE PROVIDER_API CALL
Neena Comer), Internal Medicine  [address] [place] [address]  Phone: [phone]  Fax: [phone]    Kirill Daily), Internal Medicine; Pulmonary Disease  [facility]  [address] [address] [place] [address]  Phone: [phone]  Fax: [phone]    Balaji Hobson (MD; MPH), [facility]; Cardiology  [address] [place] [address]  Phone: [phone]  Fax: [phone]

## 2018-07-05 NOTE — DISCHARGE NOTE ADULT - PATIENT PORTAL LINK FT
You can access the [email or web address] Patient Portal, offered by [facility], by registering with the following website: [email or web address]

## 2018-07-05 NOTE — DISCHARGE NOTE ADULT - MEDICATION SUMMARY - MEDICATIONS TO STOP TAKING
I will STOP taking the medications listed below when I get home from the hospital:    aspirin 81 mg oral delayed release tablet  -- 1 tab(s) by mouth once a day    ciprofloxacin 250 mg oral tablet  -- 1 tab(s) by mouth 2 times a day MDD:2 tabs  -- Avoid prolonged or excessive exposure to direct and/or artificial sunlight while taking this medication.  Check with your doctor before becoming pregnant.  Do not take dairy products, antacids, or iron preparations within one hour of this medication.  Finish all this medication unless otherwise directed by prescriber.  Medication should be taken with plenty of water.

## 2018-07-05 NOTE — PROGRESS NOTE ADULT - PROBLEM SELECTOR PROBLEM 2
Insulin dependent type 2 diabetes mellitus

## 2018-07-05 NOTE — DISCHARGE NOTE ADULT - MEDICATION SUMMARY - MEDICATIONS TO CHANGE
I will SWITCH the dose or number of times a day I take the medications listed below when I get home from the hospital:    Lantus 100 units/mL subcutaneous solution  -- 8 unit(s) subcutaneous once a day Home

## 2018-07-05 NOTE — PROGRESS NOTE ADULT - SUBJECTIVE AND OBJECTIVE BOX
Subjective:   72 YO woman with PMH of asthma with frequent exacerbations, A-flutter on Eliquis, HFrEF s/p AICD, IDDM2, and HTN presenting with increasing SOB.    No acute events overnight. She feels well this morning with improvement in her shortness of breath. She continues to cough intermittently. She slept well overnight. Eating and drinking well. Having normal BM. Walking the halls with no O2 requirement and reports improvement in leg swelling. She denies fever, HA, chest pain, palpitations, dysuria, increased urinary frequency.     REVIEW OF SYSTEMS  General: no weakness, fevers, chills	  Respiratory and Thorax: +cough, no SOB  Cardiovascular:	no chest pain or palpitations  Gastrointestinal:	no abd pain, nausea, vomiting, diarrhea or constipation  Genitourinary: no dysuria or increased frequency  Musculoskeletal:	 no back or joint pain  Neurological: no dizziness or numbness      Objective:   Vital Signs Last 24 Hrs  T(C): 36.6 (04 Jul 2018 23:26), Max: 36.6 (04 Jul 2018 23:26)  T(F): 97.9 (04 Jul 2018 23:26), Max: 97.9 (04 Jul 2018 23:26)  HR: 76 (05 Jul 2018 05:38) (71 - 84)  BP: 112/64 (05 Jul 2018 05:38) (112/64 - 129/71)  BP(mean): --  RR: 18 (04 Jul 2018 23:26) (18 - 18)  SpO2: 95% (04 Jul 2018 23:26) (95% - 96%)    PHYSICAL EXAM:  Constitutional: NAD, sitting up in bed  Neck: Supple, non tender  Respiratory: faint expiratory wheezes bilaterally, otherwise clear with no ronchi or crackles noted  Cardiovascular: RRR no murmurs, rubs or gallops noted.   Abdomen: soft, NT, ND, + BS  Extremities: trace pitting edema on palpation to mid-shin bilaterally, nontender   Vascular: 2+ pulses in distal extremities  Neurological: alert and oriented to person, place and time

## 2018-07-05 NOTE — PROGRESS NOTE ADULT - PROBLEM SELECTOR PLAN 1
Acute Exacerbation with history significant for intubation/trach (2016), now symptomatically improved w/o use of supplemental O2  -de-escalate DuoNebs  -c/w Prednisone taper: now 20mg for 3 days. Patient has done well with 20mg during acute exacerbations in the past, caution however given h/o intubation  -c/w Azithro  mg daily for total 5 days  -c/w Symbicort, Singulair  -will hold Spiriva while on duonebs  -daily peak flow to evaluate progress Acute Exacerbation with history significant for intubation/trach (2016), now symptomatically improved w/o use of supplemental O2  -de-escalate DuoNebs  -c/w Prednisone taper: now 20mg for 3 days. Patient has done well with 20mg during acute exacerbations in the past.  -c/w Azithro  mg daily for total 5 days  -c/w Symbicort, Singulair  -will hold Spiriva while on duonebs  -daily peak flow to evaluate progress

## 2018-07-05 NOTE — PROGRESS NOTE ADULT - PROBLEM SELECTOR PLAN 1
Patient presenting with worsening SOB attributed to the heat, similar to prior asthma exacerbations.   - Continue standing Duoneb treatment ATC x24hrs, then re-assess  - Taper prednisone 40mg today, dec to 30mg tomorrow  - C/w azithromycin 250mg oral   - c/w symbicort and singulair  - hold spiriva while on duonebs  - follow up peak flow measurements Patient presenting with worsening SOB attributed to the heat, similar to prior asthma exacerbations.   - d/c duonebs, restart spiriva  - Continue with prednisone taper, 20mg x3 days  - C/w azithromycin 250mg oral day 3/5  - c/w symbicort and singulair  - follow up peak flow measurements

## 2018-07-05 NOTE — DISCHARGE NOTE ADULT - MEDICATION SUMMARY - MEDICATIONS TO TAKE
I will START or STAY ON the medications listed below when I get home from the hospital:    Tussin sugar free  -- 1 dose(s) by mouth 2 times a day, As Needed  -- Indication: For ASthma/COPD    Symbicort 160 mcg-4.5 mcg/inh inhalation aerosol  -- 2 puff(s) inhaled once a day  -- Indication: For ASthma/COPD    predniSONE 20 mg oral tablet  -- 1 tab(s) by mouth once a day   -- It is very important that you take or use this exactly as directed.  Do not skip doses or discontinue unless directed by your doctor.  Obtain medical advice before taking any non-prescription drugs as some may affect the action of this medication.  Take with food or milk.    -- Indication: For ASthma/COPD    spironolactone 25 mg oral tablet  -- 0.5 tab(s) by mouth once a day   -- It is very important that you take or use this exactly as directed.  Do not skip doses or discontinue unless directed by your doctor.  May cause drowsiness or dizziness.    -- Indication: For ChF    Tylenol  -- 2 tab(s) by mouth , As Needed  -- Indication: For PAin    enalapril 10 mg oral tablet  -- 1 tab(s) by mouth 2 times a day  -- Indication: For HTN    apixaban 5 mg oral tablet  -- 1 tab(s) by mouth every 12 hours  -- Indication: For AFib    Lantus Solostar Pen 100 units/mL subcutaneous solution  -- 12 unit(s) subcutaneous once a day (at bedtime)  -- Indication: For Insulin dependent type 2 diabetes mellitus    repaglinide 0.5 mg oral tablet  -- 1 tab(s) by mouth 3 times a day (before meals)  -- Indication: For Insulin dependent type 2 diabetes mellitus    ZyrTEC 10 mg oral tablet  -- 1 tab(s) by mouth once a day  -- Indication: For ASthma/COPD    atorvastatin 20 mg oral tablet  -- 1 tab(s) by mouth once a day (at bedtime)  -- Indication: For ChF    tiotropium 18 mcg inhalation capsule  -- 1 cap(s) inhaled once a day  -- Indication: For ASthma/COPD    Ventolin HFA 90 mcg/inh inhalation aerosol  -- 2 puff(s) inhaled , As Needed  -- Indication: For ASthma/COPD    Salonpas 0.025%-1.25% topical film  -- Apply on skin to affected area , As Needed  -- Indication: For Pain    furosemide 40 mg oral tablet  -- 1 tab(s) by mouth 2 times a day  -- Indication: For ChF    Pepcid AC 10 mg oral tablet  -- 1 tab(s) by mouth once a day  -- Indication: For Need for prophylactic measure    montelukast 10 mg oral tablet  -- 1 tab(s) by mouth once a day  -- Indication: For ASthma/COPD    magnesium oxide 500 mg oral tablet  -- 1 tab(s) by mouth once a day  -- Indication: For Need for prophylactic measure    potassium chloride 20 mEq/15 mL oral liquid  -- 5 milliliter(s) by mouth once a day  -- Indication: For Need for prophylactic measure    Artificial Tears ophthalmic solution  -- 1 drop(s) to each affected eye , As Needed  -- Indication: For Need for prophylactic measure    pantoprazole 40 mg oral delayed release tablet  -- 1 tab(s) by mouth once a day (before a meal)  -- Indication: For Need for prophylactic measure

## 2018-07-05 NOTE — DISCHARGE NOTE ADULT - ADDITIONAL INSTRUCTIONS
Please follow-up with your PMD for further titration of your insulin.  Please finish the Prednisone course and see your Lung doctor within one week.

## 2018-07-05 NOTE — DISCHARGE NOTE ADULT - CARE PROVIDERS DIRECT ADDRESSES
[email or web address]

## 2018-07-05 NOTE — DISCHARGE NOTE ADULT - HOSPITAL COURSE
74yo Woman w/ PMH of Asthma (frequent exacerbations, h/o intubation in 2016), AFlutter (on Eliquis), HFrEF (s/p AICD), T2DM (on insulin), and HTN p/w SOB concerning for acute asthma exacerbation. Patient was treated with steroids and DuoNeb treatments. Within the first day of admission, patient was breathing significantly better and not requiring supplemental O2. In the past, patient had done well with Prednisone 20mg so her steroids were tapered down to that dose. There was no evidence of Heart Failure exacerbation or infection. Patient was discharged home breathing comfortably, not requiring oxygen. She will finish a 5 day of steroids. She was instructed to follow-up with her Pulmonologist within 1 week. Her insulin was titrated due to hyperglycemia caused by the steroids. Patient her ASA discontinued after discussion with her Heart Failure doctor as she is already anticoagulated for AFib and had a recent Cardaic Cath with clean coronaries. Ms. Dolan is a 72yo Woman w/ PMH of Asthma (frequent exacerbations, h/o intubation in 2016), AFlutter (on Eliquis), HFrEF (s/p AICD), T2DM (on insulin), and HTN p/w SOB concerning for acute asthma exacerbation. At baseline weight on admission, reports new cough w/ feeling of chest tightness from recent humidity and heat (known trigger of her asthma).     Patient was treated with steroids and DuoNeb treatments. Within the first day of admission, patient was breathing significantly better and not requiring supplemental O2 w/ resolution of wheezing and improvement of cough. In the past, patient had done well with Prednisone 20mg so her steroids were tapered down to that dose as higher doses give her multiple problems. There was no evidence of Heart Failure exacerbation or infection. Patient was discharged home breathing comfortably, not requiring oxygen. She will finish a 5 day of steroids. She was instructed to follow-up with her Pulmonologist within 1 week. Her insulin was titrated due to hyperglycemia caused by the steroids. Patient her ASA discontinued after discussion with her Heart Failure doctor as she is already anticoagulated for AFib and had a recent Cardaic Cath with clean coronaries. Strict RTC precautions discussed, as well as avoidance of asthma triggers. Will have close f/u w/ pulmonary doctor to ensure breathing continues to improve. Will resume home insulin dose as soon as pred is done.

## 2018-07-05 NOTE — DISCHARGE NOTE ADULT - CARE PLAN
Principal Discharge DX:	Exacerbation of asthma, unspecified asthma severity, unspecified whether persistent  Goal:	Please follow-up with your Pulmonologist within 1 week  Assessment and plan of treatment:	You had an asthma exacerbation due to the heat. You recovered quickly and well. Please finish the Prednisone course and see your Lung doctor within one week.  Secondary Diagnosis:	Insulin dependent type 2 diabetes mellitus  Assessment and plan of treatment:	Your sugars were elevated due to the steroids. While you are taking the Prednisone, please continue with the increased dose of Lantus. When the steroids finish, you can return to your usual dose. Please follow-up with your PMD for further titration of your insulin.

## 2018-07-16 ENCOUNTER — APPOINTMENT (OUTPATIENT)
Dept: CARDIOLOGY | Facility: CLINIC | Age: 73
End: 2018-07-16
Payer: MEDICARE

## 2018-07-16 VITALS
HEIGHT: 62 IN | BODY MASS INDEX: 34.6 KG/M2 | OXYGEN SATURATION: 96 % | HEART RATE: 96 BPM | DIASTOLIC BLOOD PRESSURE: 75 MMHG | WEIGHT: 188 LBS | RESPIRATION RATE: 16 BRPM | SYSTOLIC BLOOD PRESSURE: 143 MMHG

## 2018-07-16 PROCEDURE — 99214 OFFICE O/P EST MOD 30 MIN: CPT

## 2018-07-17 PROBLEM — R42 DIZZINESS AND GIDDINESS: Chronic | Status: ACTIVE | Noted: 2018-05-24

## 2018-07-17 PROBLEM — I48.92 UNSPECIFIED ATRIAL FLUTTER: Chronic | Status: ACTIVE | Noted: 2018-01-24

## 2018-07-25 ENCOUNTER — NON-APPOINTMENT (OUTPATIENT)
Age: 73
End: 2018-07-25

## 2018-07-25 ENCOUNTER — APPOINTMENT (OUTPATIENT)
Dept: ELECTROPHYSIOLOGY | Facility: CLINIC | Age: 73
End: 2018-07-25
Payer: MEDICARE

## 2018-07-25 VITALS
WEIGHT: 188 LBS | HEART RATE: 89 BPM | BODY MASS INDEX: 34.6 KG/M2 | SYSTOLIC BLOOD PRESSURE: 120 MMHG | DIASTOLIC BLOOD PRESSURE: 78 MMHG | OXYGEN SATURATION: 99 % | HEIGHT: 62 IN

## 2018-07-25 PROCEDURE — 99214 OFFICE O/P EST MOD 30 MIN: CPT

## 2018-07-25 PROCEDURE — 93000 ELECTROCARDIOGRAM COMPLETE: CPT

## 2018-08-22 NOTE — PATIENT PROFILE ADULT. - NS PRO PT RIGHT SUPPORT PERSON
Received return call from patient.  Patient reports urinary urgency and frequency. Reports having some hesitancy when starting to urinate. Stream is weak and only urinates a small amount each time. Reports feelings of incomplete bladder emptying.  Symptoms started on Friday, but worsened yesterday.  He denies having any pain with urination. No fever.    Will route to Dr. Baez's office to advise.   Declines

## 2018-08-27 ENCOUNTER — RX RENEWAL (OUTPATIENT)
Age: 73
End: 2018-08-27

## 2018-09-10 ENCOUNTER — INPATIENT (INPATIENT)
Facility: HOSPITAL | Age: 73
LOS: 1 days | Discharge: HOME | DRG: 202 | End: 2018-09-12
Attending: HOSPITALIST | Admitting: INTERNAL MEDICINE
Payer: MEDICARE

## 2018-09-10 VITALS
TEMPERATURE: 98 F | SYSTOLIC BLOOD PRESSURE: 123 MMHG | HEIGHT: 62 IN | OXYGEN SATURATION: 98 % | HEART RATE: 91 BPM | WEIGHT: 184.97 LBS | RESPIRATION RATE: 20 BRPM | DIASTOLIC BLOOD PRESSURE: 73 MMHG

## 2018-09-10 DIAGNOSIS — E11.9 TYPE 2 DIABETES MELLITUS WITHOUT COMPLICATIONS: ICD-10-CM

## 2018-09-10 DIAGNOSIS — J45.909 UNSPECIFIED ASTHMA, UNCOMPLICATED: ICD-10-CM

## 2018-09-10 DIAGNOSIS — Z95.0 PRESENCE OF CARDIAC PACEMAKER: ICD-10-CM

## 2018-09-10 DIAGNOSIS — I50.9 HEART FAILURE, UNSPECIFIED: ICD-10-CM

## 2018-09-10 DIAGNOSIS — R91.1 SOLITARY PULMONARY NODULE: ICD-10-CM

## 2018-09-10 DIAGNOSIS — I48.92 UNSPECIFIED ATRIAL FLUTTER: ICD-10-CM

## 2018-09-10 LAB
ANION GAP SERPL CALC-SCNC: 9 MMOL/L — SIGNIFICANT CHANGE UP (ref 5–17)
APPEARANCE UR: CLEAR — SIGNIFICANT CHANGE UP
BACTERIA # UR AUTO: ABNORMAL
BASOPHILS # BLD AUTO: 0 K/UL — SIGNIFICANT CHANGE UP (ref 0–0.2)
BASOPHILS NFR BLD AUTO: 0.4 % — SIGNIFICANT CHANGE UP (ref 0–2)
BILIRUB UR-MCNC: NEGATIVE — SIGNIFICANT CHANGE UP
BUN SERPL-MCNC: 22 MG/DL — SIGNIFICANT CHANGE UP (ref 7–23)
CALCIUM SERPL-MCNC: 9 MG/DL — SIGNIFICANT CHANGE UP (ref 8.4–10.5)
CHLORIDE SERPL-SCNC: 100 MMOL/L — SIGNIFICANT CHANGE UP (ref 96–108)
CO2 SERPL-SCNC: 24 MMOL/L — SIGNIFICANT CHANGE UP (ref 22–31)
COLOR SPEC: SIGNIFICANT CHANGE UP
CREAT SERPL-MCNC: 1.06 MG/DL — SIGNIFICANT CHANGE UP (ref 0.5–1.3)
DIFF PNL FLD: NEGATIVE — SIGNIFICANT CHANGE UP
EOSINOPHIL # BLD AUTO: 0.3 K/UL — SIGNIFICANT CHANGE UP (ref 0–0.5)
EOSINOPHIL NFR BLD AUTO: 3.1 % — SIGNIFICANT CHANGE UP (ref 0–6)
EPI CELLS # UR: 2 /HPF — SIGNIFICANT CHANGE UP
GAS PNL BLDV: SIGNIFICANT CHANGE UP
GLUCOSE BLDC GLUCOMTR-MCNC: 245 MG/DL — HIGH (ref 70–99)
GLUCOSE BLDC GLUCOMTR-MCNC: 261 MG/DL — HIGH (ref 70–99)
GLUCOSE BLDC GLUCOMTR-MCNC: 273 MG/DL — HIGH (ref 70–99)
GLUCOSE SERPL-MCNC: 143 MG/DL — HIGH (ref 70–99)
GLUCOSE UR QL: NEGATIVE — SIGNIFICANT CHANGE UP
HCT VFR BLD CALC: 34.5 % — SIGNIFICANT CHANGE UP (ref 34.5–45)
HGB BLD-MCNC: 11.3 G/DL — LOW (ref 11.5–15.5)
HYALINE CASTS # UR AUTO: 0 /LPF — SIGNIFICANT CHANGE UP (ref 0–2)
KETONES UR-MCNC: NEGATIVE — SIGNIFICANT CHANGE UP
LEUKOCYTE ESTERASE UR-ACNC: ABNORMAL
LYMPHOCYTES # BLD AUTO: 2.1 K/UL — SIGNIFICANT CHANGE UP (ref 1–3.3)
LYMPHOCYTES # BLD AUTO: 23 % — SIGNIFICANT CHANGE UP (ref 13–44)
MCHC RBC-ENTMCNC: 31.4 PG — SIGNIFICANT CHANGE UP (ref 27–34)
MCHC RBC-ENTMCNC: 32.8 GM/DL — SIGNIFICANT CHANGE UP (ref 32–36)
MCV RBC AUTO: 95.7 FL — SIGNIFICANT CHANGE UP (ref 80–100)
MONOCYTES # BLD AUTO: 0.7 K/UL — SIGNIFICANT CHANGE UP (ref 0–0.9)
MONOCYTES NFR BLD AUTO: 7.6 % — SIGNIFICANT CHANGE UP (ref 2–14)
NEUTROPHILS # BLD AUTO: 6.1 K/UL — SIGNIFICANT CHANGE UP (ref 1.8–7.4)
NEUTROPHILS NFR BLD AUTO: 65.9 % — SIGNIFICANT CHANGE UP (ref 43–77)
NITRITE UR-MCNC: NEGATIVE — SIGNIFICANT CHANGE UP
NT-PROBNP SERPL-SCNC: 49 PG/ML — SIGNIFICANT CHANGE UP (ref 0–300)
PH UR: 7.5 — SIGNIFICANT CHANGE UP (ref 5–8)
PLATELET # BLD AUTO: 165 K/UL — SIGNIFICANT CHANGE UP (ref 150–400)
POTASSIUM SERPL-MCNC: 4.4 MMOL/L — SIGNIFICANT CHANGE UP (ref 3.5–5.3)
POTASSIUM SERPL-SCNC: 4.4 MMOL/L — SIGNIFICANT CHANGE UP (ref 3.5–5.3)
PROT UR-MCNC: NEGATIVE — SIGNIFICANT CHANGE UP
RAPID RVP RESULT: DETECTED
RBC # BLD: 3.6 M/UL — LOW (ref 3.8–5.2)
RBC # FLD: 14.3 % — SIGNIFICANT CHANGE UP (ref 10.3–14.5)
RBC CASTS # UR COMP ASSIST: 2 /HPF — SIGNIFICANT CHANGE UP (ref 0–4)
RV+EV RNA SPEC QL NAA+PROBE: DETECTED
SODIUM SERPL-SCNC: 133 MMOL/L — LOW (ref 135–145)
SP GR SPEC: 1.02 — SIGNIFICANT CHANGE UP (ref 1.01–1.02)
TROPONIN T, HIGH SENSITIVITY RESULT: 40 NG/L — SIGNIFICANT CHANGE UP (ref 0–51)
TROPONIN T, HIGH SENSITIVITY RESULT: 47 NG/L — SIGNIFICANT CHANGE UP (ref 0–51)
UROBILINOGEN FLD QL: NEGATIVE — SIGNIFICANT CHANGE UP
WBC # BLD: 9.2 K/UL — SIGNIFICANT CHANGE UP (ref 3.8–10.5)
WBC # FLD AUTO: 9.2 K/UL — SIGNIFICANT CHANGE UP (ref 3.8–10.5)
WBC UR QL: 59 /HPF — HIGH (ref 0–5)

## 2018-09-10 PROCEDURE — 71045 X-RAY EXAM CHEST 1 VIEW: CPT | Mod: 26

## 2018-09-10 PROCEDURE — 71250 CT THORAX DX C-: CPT | Mod: 26

## 2018-09-10 PROCEDURE — 99285 EMERGENCY DEPT VISIT HI MDM: CPT | Mod: 25

## 2018-09-10 PROCEDURE — 93010 ELECTROCARDIOGRAM REPORT: CPT

## 2018-09-10 RX ORDER — ASPIRIN/CALCIUM CARB/MAGNESIUM 324 MG
81 TABLET ORAL DAILY
Qty: 0 | Refills: 0 | Status: DISCONTINUED | OUTPATIENT
Start: 2018-09-10 | End: 2018-09-12

## 2018-09-10 RX ORDER — TIOTROPIUM BROMIDE 18 UG/1
1 CAPSULE ORAL; RESPIRATORY (INHALATION) DAILY
Qty: 0 | Refills: 0 | Status: DISCONTINUED | OUTPATIENT
Start: 2018-09-10 | End: 2018-09-11

## 2018-09-10 RX ORDER — ACETAMINOPHEN 500 MG
650 TABLET ORAL ONCE
Qty: 0 | Refills: 0 | Status: COMPLETED | OUTPATIENT
Start: 2018-09-10 | End: 2018-09-10

## 2018-09-10 RX ORDER — FAMOTIDINE 10 MG/ML
1 INJECTION INTRAVENOUS
Qty: 0 | Refills: 0 | COMMUNITY

## 2018-09-10 RX ORDER — MENTHOL AND CAPSAICIN .0375; 5 G/100G; G/100G
1 PATCH TOPICAL
Qty: 0 | Refills: 0 | COMMUNITY

## 2018-09-10 RX ORDER — ENOXAPARIN SODIUM 100 MG/ML
12 INJECTION SUBCUTANEOUS
Qty: 0 | Refills: 0 | COMMUNITY

## 2018-09-10 RX ORDER — PANTOPRAZOLE SODIUM 20 MG/1
40 TABLET, DELAYED RELEASE ORAL
Qty: 0 | Refills: 0 | Status: DISCONTINUED | OUTPATIENT
Start: 2018-09-10 | End: 2018-09-12

## 2018-09-10 RX ORDER — IPRATROPIUM/ALBUTEROL SULFATE 18-103MCG
3 AEROSOL WITH ADAPTER (GRAM) INHALATION ONCE
Qty: 0 | Refills: 0 | Status: COMPLETED | OUTPATIENT
Start: 2018-09-10 | End: 2018-09-10

## 2018-09-10 RX ORDER — APIXABAN 2.5 MG/1
5 TABLET, FILM COATED ORAL EVERY 12 HOURS
Qty: 0 | Refills: 0 | Status: DISCONTINUED | OUTPATIENT
Start: 2018-09-10 | End: 2018-09-12

## 2018-09-10 RX ORDER — ALBUTEROL 90 UG/1
2 AEROSOL, METERED ORAL EVERY 6 HOURS
Qty: 0 | Refills: 0 | Status: DISCONTINUED | OUTPATIENT
Start: 2018-09-10 | End: 2018-09-12

## 2018-09-10 RX ORDER — CETIRIZINE HYDROCHLORIDE 10 MG/1
1 TABLET ORAL
Qty: 0 | Refills: 0 | COMMUNITY

## 2018-09-10 RX ORDER — MAGNESIUM OXIDE 400 MG ORAL TABLET 241.3 MG
400 TABLET ORAL
Qty: 0 | Refills: 0 | Status: DISCONTINUED | OUTPATIENT
Start: 2018-09-10 | End: 2018-09-12

## 2018-09-10 RX ORDER — DEXTROSE 50 % IN WATER 50 %
25 SYRINGE (ML) INTRAVENOUS ONCE
Qty: 0 | Refills: 0 | Status: DISCONTINUED | OUTPATIENT
Start: 2018-09-10 | End: 2018-09-12

## 2018-09-10 RX ORDER — SPIRONOLACTONE 25 MG/1
12.5 TABLET, FILM COATED ORAL DAILY
Qty: 0 | Refills: 0 | Status: DISCONTINUED | OUTPATIENT
Start: 2018-09-10 | End: 2018-09-12

## 2018-09-10 RX ORDER — GLUCAGON INJECTION, SOLUTION 0.5 MG/.1ML
1 INJECTION, SOLUTION SUBCUTANEOUS ONCE
Qty: 0 | Refills: 0 | Status: DISCONTINUED | OUTPATIENT
Start: 2018-09-10 | End: 2018-09-12

## 2018-09-10 RX ORDER — ATORVASTATIN CALCIUM 80 MG/1
20 TABLET, FILM COATED ORAL AT BEDTIME
Qty: 0 | Refills: 0 | Status: DISCONTINUED | OUTPATIENT
Start: 2018-09-10 | End: 2018-09-12

## 2018-09-10 RX ORDER — IPRATROPIUM/ALBUTEROL SULFATE 18-103MCG
3 AEROSOL WITH ADAPTER (GRAM) INHALATION EVERY 6 HOURS
Qty: 0 | Refills: 0 | Status: DISCONTINUED | OUTPATIENT
Start: 2018-09-10 | End: 2018-09-12

## 2018-09-10 RX ORDER — SODIUM CHLORIDE 9 MG/ML
1000 INJECTION, SOLUTION INTRAVENOUS
Qty: 0 | Refills: 0 | Status: DISCONTINUED | OUTPATIENT
Start: 2018-09-10 | End: 2018-09-12

## 2018-09-10 RX ORDER — FUROSEMIDE 40 MG
40 TABLET ORAL
Qty: 0 | Refills: 0 | Status: DISCONTINUED | OUTPATIENT
Start: 2018-09-10 | End: 2018-09-12

## 2018-09-10 RX ORDER — MAGNESIUM OXIDE 400 MG ORAL TABLET 241.3 MG
1 TABLET ORAL
Qty: 0 | Refills: 0 | COMMUNITY

## 2018-09-10 RX ORDER — INSULIN LISPRO 100/ML
VIAL (ML) SUBCUTANEOUS
Qty: 0 | Refills: 0 | Status: DISCONTINUED | OUTPATIENT
Start: 2018-09-10 | End: 2018-09-12

## 2018-09-10 RX ORDER — DEXTROSE 50 % IN WATER 50 %
12.5 SYRINGE (ML) INTRAVENOUS ONCE
Qty: 0 | Refills: 0 | Status: DISCONTINUED | OUTPATIENT
Start: 2018-09-10 | End: 2018-09-12

## 2018-09-10 RX ORDER — INFLUENZA VIRUS VACCINE 15; 15; 15; 15 UG/.5ML; UG/.5ML; UG/.5ML; UG/.5ML
0.5 SUSPENSION INTRAMUSCULAR ONCE
Qty: 0 | Refills: 0 | Status: COMPLETED | OUTPATIENT
Start: 2018-09-10 | End: 2018-09-10

## 2018-09-10 RX ORDER — MONTELUKAST 4 MG/1
10 TABLET, CHEWABLE ORAL DAILY
Qty: 0 | Refills: 0 | Status: DISCONTINUED | OUTPATIENT
Start: 2018-09-10 | End: 2018-09-12

## 2018-09-10 RX ORDER — DEXTROSE 50 % IN WATER 50 %
15 SYRINGE (ML) INTRAVENOUS ONCE
Qty: 0 | Refills: 0 | Status: DISCONTINUED | OUTPATIENT
Start: 2018-09-10 | End: 2018-09-12

## 2018-09-10 RX ADMIN — Medication 3 MILLILITER(S): at 11:27

## 2018-09-10 RX ADMIN — Medication 650 MILLIGRAM(S): at 22:08

## 2018-09-10 RX ADMIN — MONTELUKAST 10 MILLIGRAM(S): 4 TABLET, CHEWABLE ORAL at 18:26

## 2018-09-10 RX ADMIN — Medication 3 MILLILITER(S): at 18:07

## 2018-09-10 RX ADMIN — MAGNESIUM OXIDE 400 MG ORAL TABLET 400 MILLIGRAM(S): 241.3 TABLET ORAL at 18:26

## 2018-09-10 RX ADMIN — Medication 81 MILLIGRAM(S): at 18:26

## 2018-09-10 RX ADMIN — Medication 50 MILLIGRAM(S): at 09:07

## 2018-09-10 RX ADMIN — Medication 3 MILLILITER(S): at 23:02

## 2018-09-10 RX ADMIN — Medication 3 MILLILITER(S): at 09:07

## 2018-09-10 RX ADMIN — ATORVASTATIN CALCIUM 20 MILLIGRAM(S): 80 TABLET, FILM COATED ORAL at 21:04

## 2018-09-10 RX ADMIN — Medication 10 MILLIGRAM(S): at 18:26

## 2018-09-10 RX ADMIN — Medication 650 MILLIGRAM(S): at 21:26

## 2018-09-10 RX ADMIN — Medication 3: at 18:40

## 2018-09-10 RX ADMIN — ALBUTEROL 2 PUFF(S): 90 AEROSOL, METERED ORAL at 23:04

## 2018-09-10 RX ADMIN — APIXABAN 5 MILLIGRAM(S): 2.5 TABLET, FILM COATED ORAL at 18:26

## 2018-09-10 RX ADMIN — Medication 3: at 14:02

## 2018-09-10 NOTE — H&P ADULT - PROBLEM SELECTOR PLAN 1
failed outpatient treatment with prednisone  will admit  states she gets hallucinations with solumedrol IV  pulmonary evaluation   will treat meanwhile with high dose po prednisone  supportive care for enterovirus infection  O2 as needed

## 2018-09-10 NOTE — ED PROVIDER NOTE - NS ED ROS FT
CONSTITUTIONAL: No fevers, no chills  Eyes: no visual changes  Ears: no ear drainage, no ear pain  Nose: no nasal congestion  Mouth/Throat: no sore throat  Cardiovascular: No Chest pain  Respiratory: +SOB  Gastrointestinal: No n/v/d, no abd pain  Genitourinary: no dysuria, no hematuria  SKIN: no rashes.  NEURO: no headache

## 2018-09-10 NOTE — ED PROCEDURE NOTE - CPROC ED TIME OUT STATEMENT1
“Patient's name, , procedure and correct site were confirmed during the [facility] Timeout.”
“Patient's name, , procedure and correct site were confirmed during the [facility] Timeout.”

## 2018-09-10 NOTE — H&P ADULT - NSHPPHYSICALEXAM_GEN_ALL_CORE
PHYSICAL EXAM: vital signs as above  in mild respiratory distress  HEENT: COLLEEN EOMI  Neck: Supple, no JVD, no thyromegaly  Lungs: decreased breath sounds at bases bilateral diffuse expiratory wheeze, poor air entry  CVS: S1 S2 distant HS soft ejection systolic murmur best heard at left sternal border   Abdomen: obese, no tenderness, no organomegaly, BS present  Neuro: AO x 3 no focal weakness, no sensory abnormalities  Psych: appropriate affect  Skin: warm, dry  Ext: no cyanosis or clubbing, no edema  Msk: no joint swelling or deformities  Back: no CVA tenderness, no kyphosis/scoliosis

## 2018-09-10 NOTE — H&P ADULT - PROBLEM SELECTOR PLAN 3
euvolemic   will monitor  not overtly in acute on chronic systolic heart failure  will monitor closely  proBNP < 100, highly unlikely to have acute systolic heart failure acceptable at this time  will continue to monitor   home meds with hold parameters

## 2018-09-10 NOTE — ED PROVIDER NOTE - CONSTITUTIONAL, MLM
normal... Well appearing, well nourished, awake, alert, oriented to person, place, time/situation and in mild apparent respiratory distress.

## 2018-09-10 NOTE — H&P ADULT - PROBLEM SELECTOR PLAN 4
hold oral meds  check HbA1C  continue standing Lantus  will increase standing Lantus to 12 units in anticipation of prednisone high dose euvolemic   will monitor  not overtly in acute on chronic systolic heart failure  will monitor closely  proBNP < 100, highly unlikely to have acute systolic heart failure

## 2018-09-10 NOTE — ED PROVIDER NOTE - OBJECTIVE STATEMENT
73 YOF p/w worsening SOB x 4 days, with increased cough with white sputum x 4 days., no chest pain, no leg swelling, no vomiting, no nausea. No back pain. Pt was recently on prednisone taper due to similar symptoms.

## 2018-09-10 NOTE — H&P ADULT - ASSESSMENT
73 year old female with PMH asthma, DM2, chronic systolic heart failure of progressively worsening shortness of breath x 4 days, with associated increased cough with white sputum, clearly failed outpatient treatment with po prednisone, RVP positive for enterovirus which likely precipitated the asthma exacerbation

## 2018-09-10 NOTE — ED ADULT NURSE NOTE - OBJECTIVE STATEMENT
73 y.o female pmh HTN, DM, a.flutter, pacemaker, CHF, COPD and asthma presenting to ED accompanied by family c/o asthma exacerbation and difficulty breathing worsening over the passed few days. pt states she uses inhalers at home for management of her asthma and COPD and states that with the weather lately she has been more sob and finding it hard to catch her breath despite the use of her inhalers. pt comfortable in appearance upon assessment with no increased work of breathing, use of accessory muscles, or difficulty breathing present. pulse ox of 96-97% on RA. VS and bp stable. afebrile, RR of 18. pt denies any cp, weakness, dizziness, numbness, tingling, fevers, cough, or recent sick contacts. RVP, line and labs obtained, results pending. safety, fall, and droplet precautions maintained, call bell at the bedside and within reach.

## 2018-09-10 NOTE — ED PROVIDER NOTE - MEDICAL DECISION MAKING DETAILS
Impression:  COPD exacerbation +/- CHF exacerbation.  Plan:  workup for both.  likely admit, given degree of clinical SOB.  nebs, steroids, cxr, ecg, probnp, enzymes, pulm consult, reassess.

## 2018-09-10 NOTE — H&P ADULT - PSH
AICD (Automatic Cardioverter/Defibrillator) Present  inserted in Aug, 2008. Due for battery change in 1 month. ( [employer]) . Inserted by Dr Duffy  S/P cholecystectomy

## 2018-09-10 NOTE — H&P ADULT - HISTORY OF PRESENT ILLNESS
73 year old female with PMH of progressively worsening shortness of breath x 4 days, with associated increased cough with white sputum. Initial symptoms started around 10 days prior, she went to her pulmonary attending who prescribed outpatient po steroids with no improvement in symptoms. No chest pain, no leg swelling, no vomiting, no nausea. No back pain. Pt was recently on prednisone taper due to similar symptoms. + persistent audible wheezing and cough

## 2018-09-10 NOTE — H&P ADULT - ATTENDING COMMENTS
discussed with patient in detail, all questions answered  discussed with daughter at bedside in detail   disposition per PT eval

## 2018-09-10 NOTE — ED PROVIDER NOTE - ATTENDING CONTRIBUTION TO CARE
MD Shin:  patient seen and evaluated with the resident.  I was present for key portions of the History & Physical, and I agree with the Impression & Plan.  MD Shin:  72 yo F, c/o SOB.  MHx of COPD and CHF, on lasix/spironolactone, just got off prednisone for her COPD (not O2 dependent at baseline).  Better: rest.  Worse: exertion.  Associated Sx:  Impression:  COPD exacerbation +/- CHF exacerbation.  Plan:  workup for both.  likely admit, given degree of clinical SOB.  nebs, steroids, cxr, ecg, probnp, enzymes, pulm consult, reassess. MD Shin:  patient seen and evaluated with the resident.  I was present for key portions of the History & Physical, and I agree with the Impression & Plan.  MD Shin:  74 yo F, c/o SOB.  MHx of COPD and CHF, on lasix/spironolactone, just got off prednisone for her COPD (not O2 dependent at baseline).  Duration 2d.  Quality: wheezy.  Better: rest.  Worse: exertion.  Associated Sx: white/yellow.  Trace bipedal edema.  VS: wnl.  Physical Exam: adult F, +SOB when speaking, diffuse wheezing throughout.  1+pipedal edema.    Impression:  COPD exacerbation +/- CHF exacerbation.  Plan:  workup for both.  likely admit, given degree of clinical SOB.  nebs, steroids, cxr, ecg, probnp, enzymes, pulm consult, reassess.

## 2018-09-10 NOTE — H&P ADULT - NSHPREVIEWOFSYSTEMS_GEN_ALL_CORE
Gen: no loss of wt + loss of appetite  ENT: no dizziness no hearing loss  Ophth: no blurring of vision no loss of vision  Resp: see above HPI   CVS: No chest pain no palpitations no orthopnea  GI: no N/V/D  : no dysuria, hematuria  Endo: no polyuria no excessive sweating  Neuro: no weakness no paresthesias  Psych: No suicidal no depressive ideation  Heme: No petechiae no easy bruising  Msk: No joint pain no swelling  Skin: No rash no itching  All other ROS negative

## 2018-09-10 NOTE — ED ADULT NURSE NOTE - NSIMPLEMENTINTERV_GEN_ALL_ED
Implemented All Fall with Harm Risk Interventions:  [place] to call system. Call bell, personal items and telephone within reach. Instruct patient to call for assistance. Room bathroom lighting operational. Non-slip footwear when patient is off stretcher. Physically safe environment: no spills, clutter or unnecessary equipment. Stretcher in lowest position, wheels locked, appropriate side rails in place. Provide visual cue, wrist band, yellow gown, etc. Monitor gait and stability. Monitor for mental status changes and reorient to person, place, and time. Review medications for side effects contributing to fall risk. Reinforce activity limits and safety measures with patient and family. Provide visual clues: red socks.

## 2018-09-10 NOTE — CONSULT NOTE ADULT - PROBLEM SELECTOR RECOMMENDATION 9
pt has asthma exacerbation: Would cont steroids: for now: Cont BD as well as Singulair: She has chronic RML collapse ? etiology :  No pneumonia: FOr now cont treatment for asthma exacerbation: She is already on full AC

## 2018-09-10 NOTE — CONSULT NOTE ADULT - SUBJECTIVE AND OBJECTIVE BOX
Patient is a 73y old  Female who presents with a chief complaint of shortness of breath and wheezing (10 Sep 2018 13:05)      HPI:  73 year old female with PMH of progressively worsening shortness of breath x 4 days, with associated increased cough with white sputum. Initial symptoms started around 10 days prior, she went to her pulmonary attending who prescribed outpatient po steroids with no improvement in symptoms. No chest pain, no leg swelling, no vomiting, no nausea. No back pain. Pt was recently on prednisone taper due to similar symptoms. + persistent audible wheezing and cough (10 Sep 2018 13:05)    to me pt says she has real bad asthma as well as copd: The last dose of prednisone she took an Saturday morninig and then from  she started feeling SOB: She was intubated once two years ago tfor influenza as well as pneumonia She dneis having fever this time:  Curently she says she is feeling better vut still wheezy as well as feeling SOB: She is on s[piriva too, but for past one week she has not been  using it:      ?FOLLOWING PRESENT  [ x] Hx of PE/DVT, [y ] Hx COPD, [y ] Hx of Asthma, [y ] Hx of Hospitalization, [x ]  Hx of BiPAP/CPAP use, [x] Hx of MARK    Allergies    Coreg (Other)  digoxin (Other; Short breath (Mild to Mod))  penicillins (Hives)    Intolerances    metoprolol (Other)  Solu-Medrol (Other (Mild to Mod))      PAST MEDICAL & SURGICAL HISTORY:  Vertigo  Atrial flutter  Diverticulitis  Cardiomyopathy  Kidney stone  Cardiac Pacemaker  HTN - Hypertension  Gout  Diabetes  Congestive Heart Failure  Asthma  S/P cholecystectomy  AICD (Automatic Cardioverter/Defibrillator) Present: inserted in Aug, 2008. Due for battery change in 1 month. ( [employer]) . Inserted by Dr Duffy      FAMILY HISTORY:  Family history of brain tumor (Father)      Social History: [ x ] TOBACCO                  [ x ] ETOH                                 [  x] IVDA/DRUGS    REVIEW OF SYSTEMS      General:	x  x  Skin/Breast:  	  Ophthalmologic:x  	  ENMT:	x    Respiratory and Thorax: sob, cough, wheezing  	  Cardiovascular:	x  x  Gastrointestinal:	x    Genitourinary:	x    Musculoskeletal:	x    Neurological:	x    Psychiatric:	x    Hematology/Lymphatics:	x    Endocrine:	x    Allergic/Immunologic:	x    MEDICATIONS  (STANDING):  ALBUTerol    90 MICROgram(s) HFA Inhaler 2 Puff(s) Inhalation every 6 hours  ALBUTerol/ipratropium for Nebulization 3 milliLiter(s) Nebulizer every 6 hours  apixaban 5 milliGRAM(s) Oral every 12 hours  aspirin enteric coated 81 milliGRAM(s) Oral daily  atorvastatin 20 milliGRAM(s) Oral at bedtime  dextrose 5%. 1000 milliLiter(s) (50 mL/Hr) IV Continuous <Continuous>  dextrose 50% Injectable 12.5 Gram(s) IV Push once  dextrose 50% Injectable 25 Gram(s) IV Push once  dextrose 50% Injectable 25 Gram(s) IV Push once  enalapril 10 milliGRAM(s) Oral two times a day  furosemide    Tablet 40 milliGRAM(s) Oral two times a day  insulin lispro (HumaLOG) corrective regimen sliding scale   SubCutaneous three times a day before meals  magnesium oxide 400 milliGRAM(s) Oral three times a day with meals  montelukast 10 milliGRAM(s) Oral daily  pantoprazole    Tablet 40 milliGRAM(s) Oral before breakfast  spironolactone 12.5 milliGRAM(s) Oral daily  tiotropium 18 MICROgram(s) Capsule 1 Capsule(s) Inhalation daily    MEDICATIONS  (PRN):  dextrose 40% Gel 15 Gram(s) Oral once PRN Blood Glucose LESS THAN 70 milliGRAM(s)/deciliter  glucagon  Injectable 1 milliGRAM(s) IntraMuscular once PRN Glucose LESS THAN 70 milligrams/deciliter       Vital Signs Last 24 Hrs  T(C): 36.8 (10 Sep 2018 07:52), Max: 36.8 (10 Sep 2018 07:52)  T(F): 98.2 (10 Sep 2018 07:52), Max: 98.2 (10 Sep 2018 07:52)  HR: 84 (10 Sep 2018 13:57) (84 - 91)  BP: 155/60 (10 Sep 2018 13:57) (123/73 - 155/60)  BP(mean): --  RR: 18 (10 Sep 2018 13:57) (18 - 20)  SpO2: 97% (10 Sep 2018 13:57) (97% - 98%)        I&O's Summary      Physical Exam:   GENERAL: NAD, well-groomed, well-developed  HEENT: COLLEEN/   Atraumatic, Normocephalic  ENMT: No tonsillar erythema, exudates, or enlargement; Moist mucous membranes, Good dentition, No lesions  NECK: Supple, No JVD, Normal thyroid  CHEST/LUNG: expiratory wheeze  CVS: Regular rate and rhythm; No murmurs, rubs, or gallops  GI: : Soft, Nontender, Nondistended; Bowel sounds present  NERVOUS SYSTEM:  Alert & Oriented X3, G  EXTREMITIES:  2+ Peripheral Pulses, No clubbing, cyanosis, or edema  LYMPH: No lymphadenopathy noted  SKIN: No rashes or lesions  ENDOCRINOLOGY: No Thyromegaly  PSYCH: Appropriate    Labs:  2.9<55<4>>30<<7.345>>2.9<<3><<4><<5<<309>>                            11.3   9.2   )-----------( 165      ( 10 Sep 2018 09:06 )             34.5     09-10    133<L>  |  100  |  22  ----------------------------<  143<H>  4.4   |  24  |  1.06    Ca    9.0      10 Sep 2018 09:06      CAPILLARY BLOOD GLUCOSE      POCT Blood Glucose.: 261 mg/dL (10 Sep 2018 13:52)        Urinalysis Basic - ( 10 Sep 2018 09:06 )    Color: Light Yellow / Appearance: Clear / S.016 / pH: x  Gluc: x / Ketone: Negative  / Bili: Negative / Urobili: Negative   Blood: x / Protein: Negative / Nitrite: Negative   Leuk Esterase: Large / RBC: 2 /hpf / WBC 59 /hpf   Sq Epi: x / Non Sq Epi: 2 /hpf / Bacteria: Many      D DImer  Serum Pro-Brain Natriuretic Peptide: 49 pg/mL (09-10 @ 09:06)      Studies< from: CT Chest No Cont (09.10.18 @ 12:33) >    CHEST:     LUNGS AND LARGE AIRWAYS: Near complete collapse of the right middle lobe,   which is chronic and unchanged from prior examination. Bronchiectasis   seen in the bilateral lower lobes. 7 mm left upper lobe solid nodule   unchanged from prior examination.  PLEURA: No pleural effusion.  VESSELS: Main pulmonary artery is normal in size. The aorta is normal in   size and contour. Minimal atherosclerotic disease of the abdominal aorta.  HEART: Heart size is normal. No pericardial effusion. There is a   biventricular AICD in place.  MEDIASTINUM AND MRAIETTA: No lymphadenopathy.  CHEST WALL AND LOWER NECK: The thyroid gland is unremarkable.  VISUALIZED UPPER ABDOMEN: Status post total cystectomy. Left   nephrolithiasis.  BONES: Stable degenerative changes of the spine.    IMPRESSION:     Unchanged appearance of near complete collapse of the right middlelobe.    Unchanged appearance of 7 mm left upper lobe solid nodule.    No evidence of pneumonia.                    HERSON AVILES M.D., RADIOLOGY RESIDENT  This document has been electronically signed.  MICHELE VIEIRA M.D., ATTENDING RADIOLOGIST  This document has been electronically signed. Sep 10 2018  4:09PM        < end of copied text >    Chest X-RAY  CT SCAN Chest   CT Abdomen  Venous Dopplers: LE:   Others

## 2018-09-10 NOTE — ED PROVIDER NOTE - PSH
AICD (Automatic Cardioverter/Defibrillator) Present  inserted in Aug, 2008. Due for battery change in 1 month. ( [employer]) . Inserted by Dr Duffy  S/P cholecystectomy

## 2018-09-10 NOTE — H&P ADULT - PROBLEM SELECTOR PLAN 5
will monitor HR  no intervention at this time hold oral meds  check HbA1C  continue standing Lantus  will increase standing Lantus to 12 units in anticipation of prednisone high dose

## 2018-09-11 ENCOUNTER — TRANSCRIPTION ENCOUNTER (OUTPATIENT)
Age: 73
End: 2018-09-11

## 2018-09-11 DIAGNOSIS — I50.22 CHRONIC SYSTOLIC (CONGESTIVE) HEART FAILURE: ICD-10-CM

## 2018-09-11 DIAGNOSIS — J45.901 UNSPECIFIED ASTHMA WITH (ACUTE) EXACERBATION: ICD-10-CM

## 2018-09-11 DIAGNOSIS — I10 ESSENTIAL (PRIMARY) HYPERTENSION: ICD-10-CM

## 2018-09-11 LAB
ANION GAP SERPL CALC-SCNC: 12 MMOL/L — SIGNIFICANT CHANGE UP (ref 5–17)
BUN SERPL-MCNC: 30 MG/DL — HIGH (ref 7–23)
CALCIUM SERPL-MCNC: 9.4 MG/DL — SIGNIFICANT CHANGE UP (ref 8.4–10.5)
CHLORIDE SERPL-SCNC: 98 MMOL/L — SIGNIFICANT CHANGE UP (ref 96–108)
CO2 SERPL-SCNC: 24 MMOL/L — SIGNIFICANT CHANGE UP (ref 22–31)
CREAT SERPL-MCNC: 1.14 MG/DL — SIGNIFICANT CHANGE UP (ref 0.5–1.3)
GLUCOSE BLDC GLUCOMTR-MCNC: 128 MG/DL — HIGH (ref 70–99)
GLUCOSE BLDC GLUCOMTR-MCNC: 197 MG/DL — HIGH (ref 70–99)
GLUCOSE BLDC GLUCOMTR-MCNC: 287 MG/DL — HIGH (ref 70–99)
GLUCOSE BLDC GLUCOMTR-MCNC: 299 MG/DL — HIGH (ref 70–99)
GLUCOSE BLDC GLUCOMTR-MCNC: 352 MG/DL — HIGH (ref 70–99)
GLUCOSE SERPL-MCNC: 170 MG/DL — HIGH (ref 70–99)
HBA1C BLD-MCNC: 7.7 % — HIGH (ref 4–5.6)
HCT VFR BLD CALC: 33.8 % — LOW (ref 34.5–45)
HCV AB S/CO SERPL IA: 0.1 S/CO — SIGNIFICANT CHANGE UP
HCV AB SERPL-IMP: SIGNIFICANT CHANGE UP
HGB BLD-MCNC: 11 G/DL — LOW (ref 11.5–15.5)
MCHC RBC-ENTMCNC: 30.7 PG — SIGNIFICANT CHANGE UP (ref 27–34)
MCHC RBC-ENTMCNC: 32.5 GM/DL — SIGNIFICANT CHANGE UP (ref 32–36)
MCV RBC AUTO: 94.4 FL — SIGNIFICANT CHANGE UP (ref 80–100)
PLATELET # BLD AUTO: 184 K/UL — SIGNIFICANT CHANGE UP (ref 150–400)
POTASSIUM SERPL-MCNC: 4.2 MMOL/L — SIGNIFICANT CHANGE UP (ref 3.5–5.3)
POTASSIUM SERPL-SCNC: 4.2 MMOL/L — SIGNIFICANT CHANGE UP (ref 3.5–5.3)
RBC # BLD: 3.58 M/UL — LOW (ref 3.8–5.2)
RBC # FLD: 15.7 % — HIGH (ref 10.3–14.5)
SODIUM SERPL-SCNC: 134 MMOL/L — LOW (ref 135–145)
TSH SERPL-MCNC: 0.23 UIU/ML — LOW (ref 0.27–4.2)
WBC # BLD: 11.66 K/UL — HIGH (ref 3.8–10.5)
WBC # FLD AUTO: 11.66 K/UL — HIGH (ref 3.8–10.5)

## 2018-09-11 PROCEDURE — 99233 SBSQ HOSP IP/OBS HIGH 50: CPT

## 2018-09-11 RX ORDER — INSULIN LISPRO 100/ML
VIAL (ML) SUBCUTANEOUS AT BEDTIME
Qty: 0 | Refills: 0 | Status: DISCONTINUED | OUTPATIENT
Start: 2018-09-11 | End: 2018-09-12

## 2018-09-11 RX ORDER — OMEPRAZOLE 10 MG/1
1 CAPSULE, DELAYED RELEASE ORAL
Qty: 30 | Refills: 0
Start: 2018-09-11

## 2018-09-11 RX ORDER — INSULIN GLARGINE 100 [IU]/ML
12 INJECTION, SOLUTION SUBCUTANEOUS ONCE
Qty: 0 | Refills: 0 | Status: COMPLETED | OUTPATIENT
Start: 2018-09-11 | End: 2018-09-11

## 2018-09-11 RX ORDER — BUDESONIDE AND FORMOTEROL FUMARATE DIHYDRATE 160; 4.5 UG/1; UG/1
2 AEROSOL RESPIRATORY (INHALATION)
Qty: 0 | Refills: 0 | COMMUNITY

## 2018-09-11 RX ORDER — IPRATROPIUM/ALBUTEROL SULFATE 18-103MCG
3 AEROSOL WITH ADAPTER (GRAM) INHALATION ONCE
Qty: 0 | Refills: 0 | Status: COMPLETED | OUTPATIENT
Start: 2018-09-11 | End: 2018-09-11

## 2018-09-11 RX ORDER — INSULIN GLARGINE 100 [IU]/ML
12 INJECTION, SOLUTION SUBCUTANEOUS ONCE
Qty: 0 | Refills: 0 | Status: COMPLETED | OUTPATIENT
Start: 2018-09-11 | End: 2018-09-12

## 2018-09-11 RX ORDER — ENOXAPARIN SODIUM 100 MG/ML
8 INJECTION SUBCUTANEOUS
Qty: 0 | Refills: 0 | COMMUNITY

## 2018-09-11 RX ORDER — INSULIN GLARGINE 100 [IU]/ML
12 INJECTION, SOLUTION SUBCUTANEOUS AT BEDTIME
Qty: 0 | Refills: 0 | Status: DISCONTINUED | OUTPATIENT
Start: 2018-09-12 | End: 2018-09-12

## 2018-09-11 RX ORDER — OMEPRAZOLE 10 MG/1
1 CAPSULE, DELAYED RELEASE ORAL
Qty: 0 | Refills: 0 | COMMUNITY

## 2018-09-11 RX ORDER — PANTOPRAZOLE SODIUM 20 MG/1
1 TABLET, DELAYED RELEASE ORAL
Qty: 0 | Refills: 0 | COMMUNITY
Start: 2018-09-11

## 2018-09-11 RX ADMIN — PANTOPRAZOLE SODIUM 40 MILLIGRAM(S): 20 TABLET, DELAYED RELEASE ORAL at 05:36

## 2018-09-11 RX ADMIN — Medication 81 MILLIGRAM(S): at 19:55

## 2018-09-11 RX ADMIN — Medication 3 MILLILITER(S): at 15:54

## 2018-09-11 RX ADMIN — Medication 3 MILLILITER(S): at 16:05

## 2018-09-11 RX ADMIN — ALBUTEROL 2 PUFF(S): 90 AEROSOL, METERED ORAL at 18:10

## 2018-09-11 RX ADMIN — ALBUTEROL 2 PUFF(S): 90 AEROSOL, METERED ORAL at 05:19

## 2018-09-11 RX ADMIN — Medication 10 MILLIGRAM(S): at 05:17

## 2018-09-11 RX ADMIN — Medication 10 MILLIGRAM(S): at 19:56

## 2018-09-11 RX ADMIN — Medication 3: at 18:10

## 2018-09-11 RX ADMIN — ATORVASTATIN CALCIUM 20 MILLIGRAM(S): 80 TABLET, FILM COATED ORAL at 21:10

## 2018-09-11 RX ADMIN — Medication 200 MILLIGRAM(S): at 23:22

## 2018-09-11 RX ADMIN — Medication 40 MILLIGRAM(S): at 18:09

## 2018-09-11 RX ADMIN — MONTELUKAST 10 MILLIGRAM(S): 4 TABLET, CHEWABLE ORAL at 13:17

## 2018-09-11 RX ADMIN — Medication 3 MILLILITER(S): at 05:16

## 2018-09-11 RX ADMIN — INSULIN GLARGINE 12 UNIT(S): 100 INJECTION, SOLUTION SUBCUTANEOUS at 06:46

## 2018-09-11 RX ADMIN — MAGNESIUM OXIDE 400 MG ORAL TABLET 400 MILLIGRAM(S): 241.3 TABLET ORAL at 09:10

## 2018-09-11 RX ADMIN — ALBUTEROL 2 PUFF(S): 90 AEROSOL, METERED ORAL at 23:14

## 2018-09-11 RX ADMIN — Medication 3 MILLILITER(S): at 23:13

## 2018-09-11 RX ADMIN — Medication 3 MILLILITER(S): at 13:16

## 2018-09-11 RX ADMIN — Medication 5: at 13:17

## 2018-09-11 RX ADMIN — ALBUTEROL 2 PUFF(S): 90 AEROSOL, METERED ORAL at 13:18

## 2018-09-11 RX ADMIN — APIXABAN 5 MILLIGRAM(S): 2.5 TABLET, FILM COATED ORAL at 05:16

## 2018-09-11 RX ADMIN — APIXABAN 5 MILLIGRAM(S): 2.5 TABLET, FILM COATED ORAL at 18:09

## 2018-09-11 RX ADMIN — SPIRONOLACTONE 12.5 MILLIGRAM(S): 25 TABLET, FILM COATED ORAL at 05:18

## 2018-09-11 RX ADMIN — MAGNESIUM OXIDE 400 MG ORAL TABLET 400 MILLIGRAM(S): 241.3 TABLET ORAL at 13:17

## 2018-09-11 RX ADMIN — MAGNESIUM OXIDE 400 MG ORAL TABLET 400 MILLIGRAM(S): 241.3 TABLET ORAL at 18:10

## 2018-09-11 RX ADMIN — Medication 60 MILLIGRAM(S): at 05:16

## 2018-09-11 RX ADMIN — Medication 40 MILLIGRAM(S): at 05:16

## 2018-09-11 NOTE — PROGRESS NOTE ADULT - PROBLEM SELECTOR PLAN 1
seems to be getting better: No need to change to IV steroids: Would cont 60 mg today too, and decrease to 40 mg in AM

## 2018-09-11 NOTE — PROGRESS NOTE ADULT - PROBLEM SELECTOR PLAN 1
c/w prednisone 60mg po daily, switch to 40mg tomorrow  a lot better will ask for pulm clearance for d.c c/w prednisone 60mg po daily, switch to 40mg tomorrow  a lot better will ask for pulm clearance for d.c  c.w duonebs  cw spiriva c/w prednisone 60mg po daily, switch to 40mg tomorrow  a lot better will ask for pulm clearance for d.c  c.w radha  cw spiriva  ct chest showing unchanged rml collapse and 7mm pulm nodule- opt f/u with pulm

## 2018-09-11 NOTE — DISCHARGE NOTE ADULT - MEDICATION SUMMARY - MEDICATIONS TO TAKE
I will START or STAY ON the medications listed below when I get home from the hospital:    predniSONE 20 mg oral tablet  -- 2 tab(s) by mouth once a day   -- Indication: For Asthma exacerbation    spironolactone 25 mg oral tablet  -- 0.5 tab(s) by mouth once a day   -- It is very important that you take or use this exactly as directed.  Do not skip doses or discontinue unless directed by your doctor.  May cause drowsiness or dizziness.    -- Indication: For Chronic systolic heart failure    Tylenol  -- 2 tab(s) by mouth , As Needed  -- Indication: For Supplement pain/fever    Aspirin Enteric Coated 81 mg oral delayed release tablet  -- 1 tab(s) by mouth once a day  -- Indication: For Supplement    enalapril 10 mg oral tablet  -- 1 tab(s) by mouth 2 times a day  -- Indication: For Hypertension    apixaban 5 mg oral tablet  -- 1 tab(s) by mouth every 12 hours  -- Indication: For Atrial flutter    repaglinide 0.5 mg oral tablet  -- 1 tab(s) by mouth 3 times a day (before meals)  -- Indication: For Diabetes    Lantus Solostar Pen 100 units/mL subcutaneous solution  -- 8 unit(s) subcutaneous once a day (at bedtime)  -- Indication: For Diabetes    atorvastatin 20 mg oral tablet  -- 1 tab(s) by mouth once a day (at bedtime)  -- Indication: For CaD    tiotropium 18 mcg inhalation capsule  -- 1 cap(s) inhaled once a day  -- Indication: For Asthma    Ventolin HFA 90 mcg/inh inhalation aerosol  -- 2 puff(s) inhaled , As Needed  -- Indication: For Asthma    furosemide 40 mg oral tablet  -- 1 tab(s) by mouth 2 times a day  -- Indication: For Chronic systolic heart failure    montelukast 10 mg oral tablet  -- 1 tab(s) by mouth once a day  -- Indication: For Asthma    potassium chloride 20 mEq/15 mL oral liquid  -- 5 milliliter(s) by mouth once a day  -- Indication: For supplement    magnesium oxide 500 mg oral tablet  -- 1 tab(s) by mouth 3 times a day  -- Indication: For supplement    omeprazole 40 mg oral delayed release capsule  -- 1 cap(s) by mouth once a day  -- Indication: For Gerd without esophagitis I will START or STAY ON the medications listed below when I get home from the hospital:    predniSONE 20 mg oral tablet  -- 2 tab(s) by mouth once a day   -- Indication: For Asthma exacerbation    spironolactone 25 mg oral tablet  -- 0.5 tab(s) by mouth once a day   -- It is very important that you take or use this exactly as directed.  Do not skip doses or discontinue unless directed by your doctor.  May cause drowsiness or dizziness.    -- Indication: For Chronic systolic heart failure    Tylenol  -- 2 tab(s) by mouth , As Needed  -- Indication: For Supplement pain/fever    Aspirin Enteric Coated 81 mg oral delayed release tablet  -- 1 tab(s) by mouth once a day  -- Indication: For Supplement    enalapril 10 mg oral tablet  -- 1 tab(s) by mouth 2 times a day  -- Indication: For Hypertension    apixaban 5 mg oral tablet  -- 1 tab(s) by mouth every 12 hours  -- Indication: For Atrial flutter    repaglinide 0.5 mg oral tablet  -- 1 tab(s) by mouth 3 times a day (before meals)  -- Indication: For Diabetes    Lantus Solostar Pen 100 units/mL subcutaneous solution  -- 12 unit(s) subcutaneous once a day (at bedtime)  -- Indication: For Diabetes    atorvastatin 20 mg oral tablet  -- 1 tab(s) by mouth once a day (at bedtime)  -- Indication: For CaD    tiotropium 18 mcg inhalation capsule  -- 1 cap(s) inhaled once a day  -- Indication: For Asthma    Ventolin HFA 90 mcg/inh inhalation aerosol  -- 2 puff(s) inhaled , As Needed  -- Indication: For Asthma    furosemide 40 mg oral tablet  -- 1 tab(s) by mouth 2 times a day  -- Indication: For Chronic systolic heart failure    montelukast 10 mg oral tablet  -- 1 tab(s) by mouth once a day  -- Indication: For Asthma    potassium chloride 20 mEq/15 mL oral liquid  -- 5 milliliter(s) by mouth once a day  -- Indication: For supplement    magnesium oxide 500 mg oral tablet  -- 1 tab(s) by mouth 3 times a day  -- Indication: For supplement    omeprazole 40 mg oral delayed release capsule  -- 1 cap(s) by mouth once a day  -- Indication: For Gerd without esophagitis I will START or STAY ON the medications listed below when I get home from the hospital:    predniSONE 20 mg oral tablet  -- 2 tab(s) by mouth once a day x 3 days  -- Indication: For Asthma    spironolactone 25 mg oral tablet  -- 0.5 tab(s) by mouth once a day   -- It is very important that you take or use this exactly as directed.  Do not skip doses or discontinue unless directed by your doctor.  May cause drowsiness or dizziness.    -- Indication: For Chronic systolic heart failure    Tylenol  -- 2 tab(s) by mouth , As Needed  -- Indication: For Supplement pain/fever    Aspirin Enteric Coated 81 mg oral delayed release tablet  -- 1 tab(s) by mouth once a day  -- Indication: For Supplement    enalapril 10 mg oral tablet  -- 1 tab(s) by mouth 2 times a day  -- Indication: For Hypertension    apixaban 5 mg oral tablet  -- 1 tab(s) by mouth every 12 hours  -- Indication: For Atrial flutter    repaglinide 0.5 mg oral tablet  -- 1 tab(s) by mouth 3 times a day (before meals)  -- Indication: For Diabetes    Lantus Solostar Pen 100 units/mL subcutaneous solution  -- 12 unit(s) subcutaneous once a day (at bedtime)  -- Indication: For Diabetes    HumaLOG KwikPen 100 units/mL injectable solution  -- 1 milliliter(s) injectable 3 times a day   Adjust as per sliding scale  0-250 - 0 units  251-300- 1 u  301-350 -2 u  351- 400 - 3 u  -- Indication: For Diabetes    atorvastatin 20 mg oral tablet  -- 1 tab(s) by mouth once a day (at bedtime)  -- Indication: For CaD    tiotropium 18 mcg inhalation capsule  -- 1 cap(s) inhaled once a day  -- Indication: For Asthma    Ventolin HFA 90 mcg/inh inhalation aerosol  -- 2 puff(s) inhaled , As Needed  -- Indication: For Asthma    furosemide 40 mg oral tablet  -- 1 tab(s) by mouth 2 times a day  -- Indication: For Chronic systolic heart failure    montelukast 10 mg oral tablet  -- 1 tab(s) by mouth once a day  -- Indication: For Asthma    potassium chloride 20 mEq/15 mL oral liquid  -- 5 milliliter(s) by mouth once a day  -- Indication: For supplement    magnesium oxide 500 mg oral tablet  -- 1 tab(s) by mouth 3 times a day  -- Indication: For supplement    omeprazole 40 mg oral delayed release capsule  -- 1 cap(s) by mouth once a day  -- Indication: For Gerd without esophagitis

## 2018-09-11 NOTE — DISCHARGE NOTE ADULT - PATIENT PORTAL LINK FT
You can access the [facility], offered by [facility], by registering with the following website: [email or web address]

## 2018-09-11 NOTE — DISCHARGE NOTE ADULT - HOSPITAL COURSE
valentín 73 year old female with PMH asthma, DM2, chronic systolic heart failure of progressively worsening shortness of breath x 4 days, with associated increased cough with white sputum, clearly failed outpatient treatment with po prednisone, RVP positive for enterovirus which likely precipitated the asthma exacerbation.  Pt refused IV steroids due to increased sensitivity.  Patient was treat with high dose oral steroid and was seen by Pulmonology. Chest CT showing unchanged rt middle lobe collapse and 7mm pulm nodule.    Asthma exacerbation: c/w prednisone 60mg po daily, switch to 40mg tomorrow, Duoneb, Spiriva   opt f/u with pulm.Atrial flutter.  Rate controlled. Continue Apixaban 5 po BID.   Chronic systolic heart failure Euvolemic- continue with lasix 40mg po daily, enalapril and Aldactone    . Pt cleared for discharge to the community by Pulmonology and Primary team- stable to be discharged to follow up with Dr Daiyl and Dr. Hobson  Plan of care discussed with PMD in agreement

## 2018-09-11 NOTE — DISCHARGE NOTE ADULT - CARE PROVIDER_API CALL
Kirill Daily), Internal Medicine; Pulmonary Disease  [facility]  [address] [place] [address]  Phone: [phone]  Fax: [phone]    Balaji Hobson (MD; MPH), Adv Heart Fail [facility]ns[facility] Cardio; Cardiology  [address] [place] [address]  Phone: [phone]  Fax: [phone]

## 2018-09-11 NOTE — DISCHARGE NOTE ADULT - MEDICATION SUMMARY - MEDICATIONS TO CHANGE
I will SWITCH the dose or number of times a day I take the medications listed below when I get home from the hospital:    Symbicort 160 mcg-4.5 mcg/inh inhalation aerosol  -- 2 puff(s) inhaled once a day    Tylenol  -- 2 tab(s) by mouth , As Needed    pantoprazole 40 mg oral delayed release tablet  -- 1 tab(s) by mouth once a day (before a meal) I will SWITCH the dose or number of times a day I take the medications listed below when I get home from the hospital:    Symbicort 160 mcg-4.5 mcg/inh inhalation aerosol  -- 2 puff(s) inhaled once a day    Tylenol  -- 2 tab(s) by mouth , As Needed    Lantus Solostar Pen 100 units/mL subcutaneous solution  -- 8 unit(s) subcutaneous once a day (at bedtime)    pantoprazole 40 mg oral delayed release tablet  -- 1 tab(s) by mouth once a day (before a meal)

## 2018-09-11 NOTE — DISCHARGE NOTE ADULT - CARE PLAN
Principal Discharge DX:	Asthma exacerbation  Goal:	improvement to resolution  Assessment and plan of treatment:	Continue Prednisone 40mg x 7 more days  Hold Symbicort while taking oral steriods  Notify PMD for increased SOB, worsening wheezing, fever or chills  Follow up with your pulmonologist in the community in 7 to 10 days  Secondary Diagnosis:	Atrial flutter  Goal:	Controlled  Assessment and plan of treatment:	Continue Apixiban as prescribed  Maintain routine appointment with your Cardiologist  Secondary Diagnosis:	Diabetes  Goal:	controlled  Assessment and plan of treatment:	Continue current care plan  HbA1c 7-7 (9/11/18)  Secondary Diagnosis:	Chronic systolic heart failure  Goal:	stable  Assessment and plan of treatment:	Continue current treatment plan and follow up with your cardiologist in the community Principal Discharge DX:	Asthma exacerbation  Goal:	improvement to resolution  Assessment and plan of treatment:	Continue Prednisone 40mg x 3 more days  Hold Symbicort while taking oral steriods  Notify PMD for increased SOB, worsening wheezing, fever or chills  Follow up with your pulmonologist in the community in 7 to 10 days  Secondary Diagnosis:	Atrial flutter  Goal:	Controlled  Assessment and plan of treatment:	Continue Apixiban as prescribed  Maintain routine appointment with your Cardiologist  Secondary Diagnosis:	Diabetes  Goal:	controlled  Assessment and plan of treatment:	Continue current care plan  HbA1c 7-7 (9/11/18)  Secondary Diagnosis:	Chronic systolic heart failure  Goal:	stable  Assessment and plan of treatment:	Continue current treatment plan and follow up with your cardiologist in the community

## 2018-09-11 NOTE — DISCHARGE NOTE ADULT - CARE PROVIDERS DIRECT ADDRESSES
[email or web address]

## 2018-09-11 NOTE — DISCHARGE NOTE ADULT - PLAN OF CARE
improvement to resolution Continue Prednisone 40mg x 7 more days  Hold Symbicort while taking oral steriods  Notify PMD for increased SOB, worsening wheezing, fever or chills  Follow up with your pulmonologist in the community in 7 to 10 days Controlled Continue Apixiban as prescribed  Maintain routine appointment with your Cardiologist controlled Continue current care plan  HbA1c 7-7 (9/11/18) stable Continue current treatment plan and follow up with your cardiologist in the community Continue Prednisone 40mg x 3 more days  Hold Symbicort while taking oral steriods  Notify PMD for increased SOB, worsening wheezing, fever or chills  Follow up with your pulmonologist in the community in 7 to 10 days

## 2018-09-12 VITALS
SYSTOLIC BLOOD PRESSURE: 101 MMHG | HEART RATE: 79 BPM | DIASTOLIC BLOOD PRESSURE: 67 MMHG | TEMPERATURE: 98 F | OXYGEN SATURATION: 100 % | RESPIRATION RATE: 18 BRPM

## 2018-09-12 LAB
-  AMIKACIN: SIGNIFICANT CHANGE UP
-  AMOXICILLIN/CLAVULANIC ACID: SIGNIFICANT CHANGE UP
-  AMPICILLIN/SULBACTAM: SIGNIFICANT CHANGE UP
-  AMPICILLIN: SIGNIFICANT CHANGE UP
-  AZTREONAM: SIGNIFICANT CHANGE UP
-  CEFAZOLIN: SIGNIFICANT CHANGE UP
-  CEFEPIME: SIGNIFICANT CHANGE UP
-  CEFOXITIN: SIGNIFICANT CHANGE UP
-  CEFTRIAXONE: SIGNIFICANT CHANGE UP
-  CIPROFLOXACIN: SIGNIFICANT CHANGE UP
-  ERTAPENEM: SIGNIFICANT CHANGE UP
-  GENTAMICIN: SIGNIFICANT CHANGE UP
-  IMIPENEM: SIGNIFICANT CHANGE UP
-  LEVOFLOXACIN: SIGNIFICANT CHANGE UP
-  MEROPENEM: SIGNIFICANT CHANGE UP
-  NITROFURANTOIN: SIGNIFICANT CHANGE UP
-  PIPERACILLIN/TAZOBACTAM: SIGNIFICANT CHANGE UP
-  TIGECYCLINE: SIGNIFICANT CHANGE UP
-  TOBRAMYCIN: SIGNIFICANT CHANGE UP
-  TRIMETHOPRIM/SULFAMETHOXAZOLE: SIGNIFICANT CHANGE UP
CULTURE RESULTS: SIGNIFICANT CHANGE UP
GLUCOSE BLDC GLUCOMTR-MCNC: 118 MG/DL — HIGH (ref 70–99)
GLUCOSE BLDC GLUCOMTR-MCNC: 247 MG/DL — HIGH (ref 70–99)
GLUCOSE BLDC GLUCOMTR-MCNC: 265 MG/DL — HIGH (ref 70–99)
METHOD TYPE: SIGNIFICANT CHANGE UP
ORGANISM # SPEC MICROSCOPIC CNT: SIGNIFICANT CHANGE UP
ORGANISM # SPEC MICROSCOPIC CNT: SIGNIFICANT CHANGE UP
SPECIMEN SOURCE: SIGNIFICANT CHANGE UP

## 2018-09-12 PROCEDURE — 80048 BASIC METABOLIC PNL TOTAL CA: CPT

## 2018-09-12 PROCEDURE — 84484 ASSAY OF TROPONIN QUANT: CPT

## 2018-09-12 PROCEDURE — 86803 HEPATITIS C AB TEST: CPT

## 2018-09-12 PROCEDURE — 87086 URINE CULTURE/COLONY COUNT: CPT

## 2018-09-12 PROCEDURE — 71250 CT THORAX DX C-: CPT

## 2018-09-12 PROCEDURE — 83036 HEMOGLOBIN GLYCOSYLATED A1C: CPT

## 2018-09-12 PROCEDURE — 83880 ASSAY OF NATRIURETIC PEPTIDE: CPT

## 2018-09-12 PROCEDURE — 83605 ASSAY OF LACTIC ACID: CPT

## 2018-09-12 PROCEDURE — 87798 DETECT AGENT NOS DNA AMP: CPT

## 2018-09-12 PROCEDURE — 99285 EMERGENCY DEPT VISIT HI MDM: CPT | Mod: 25

## 2018-09-12 PROCEDURE — 82962 GLUCOSE BLOOD TEST: CPT

## 2018-09-12 PROCEDURE — 87581 M.PNEUMON DNA AMP PROBE: CPT

## 2018-09-12 PROCEDURE — 84443 ASSAY THYROID STIM HORMONE: CPT

## 2018-09-12 PROCEDURE — 94640 AIRWAY INHALATION TREATMENT: CPT

## 2018-09-12 PROCEDURE — 85014 HEMATOCRIT: CPT

## 2018-09-12 PROCEDURE — 81001 URINALYSIS AUTO W/SCOPE: CPT

## 2018-09-12 PROCEDURE — 82435 ASSAY OF BLOOD CHLORIDE: CPT

## 2018-09-12 PROCEDURE — 84132 ASSAY OF SERUM POTASSIUM: CPT

## 2018-09-12 PROCEDURE — 71045 X-RAY EXAM CHEST 1 VIEW: CPT

## 2018-09-12 PROCEDURE — 87486 CHLMYD PNEUM DNA AMP PROBE: CPT

## 2018-09-12 PROCEDURE — 82803 BLOOD GASES ANY COMBINATION: CPT

## 2018-09-12 PROCEDURE — 87186 SC STD MICRODIL/AGAR DIL: CPT

## 2018-09-12 PROCEDURE — 82947 ASSAY GLUCOSE BLOOD QUANT: CPT

## 2018-09-12 PROCEDURE — 82330 ASSAY OF CALCIUM: CPT

## 2018-09-12 PROCEDURE — 85027 COMPLETE CBC AUTOMATED: CPT

## 2018-09-12 PROCEDURE — 87633 RESP VIRUS 12-25 TARGETS: CPT

## 2018-09-12 PROCEDURE — 84295 ASSAY OF SERUM SODIUM: CPT

## 2018-09-12 PROCEDURE — 93005 ELECTROCARDIOGRAM TRACING: CPT

## 2018-09-12 PROCEDURE — 36000 PLACE NEEDLE IN VEIN: CPT | Mod: LT

## 2018-09-12 RX ORDER — INSULIN LISPRO 100/ML
1 VIAL (ML) SUBCUTANEOUS
Qty: 1 | Refills: 0
Start: 2018-09-12

## 2018-09-12 RX ADMIN — APIXABAN 5 MILLIGRAM(S): 2.5 TABLET, FILM COATED ORAL at 05:15

## 2018-09-12 RX ADMIN — MAGNESIUM OXIDE 400 MG ORAL TABLET 400 MILLIGRAM(S): 241.3 TABLET ORAL at 11:42

## 2018-09-12 RX ADMIN — PANTOPRAZOLE SODIUM 40 MILLIGRAM(S): 20 TABLET, DELAYED RELEASE ORAL at 05:15

## 2018-09-12 RX ADMIN — ALBUTEROL 2 PUFF(S): 90 AEROSOL, METERED ORAL at 05:17

## 2018-09-12 RX ADMIN — Medication 3 MILLILITER(S): at 05:15

## 2018-09-12 RX ADMIN — SPIRONOLACTONE 12.5 MILLIGRAM(S): 25 TABLET, FILM COATED ORAL at 05:14

## 2018-09-12 RX ADMIN — Medication 10 MILLIGRAM(S): at 05:14

## 2018-09-12 RX ADMIN — Medication 40 MILLIGRAM(S): at 11:43

## 2018-09-12 RX ADMIN — INSULIN GLARGINE 12 UNIT(S): 100 INJECTION, SOLUTION SUBCUTANEOUS at 00:17

## 2018-09-12 RX ADMIN — Medication 81 MILLIGRAM(S): at 11:43

## 2018-09-12 RX ADMIN — Medication 40 MILLIGRAM(S): at 05:15

## 2018-09-12 RX ADMIN — Medication 3 MILLILITER(S): at 11:50

## 2018-09-12 RX ADMIN — Medication 3: at 13:55

## 2018-09-12 RX ADMIN — MONTELUKAST 10 MILLIGRAM(S): 4 TABLET, CHEWABLE ORAL at 11:43

## 2018-09-12 NOTE — PROGRESS NOTE ADULT - PROBLEM SELECTOR PLAN 2
stable: outpt follow up: She also has chronic RML collapse: Outpt follow up: with her primary pulmonologist!  9/11: Outpt followup: The nodule is stable:
rate controlled  continue Apixaban 5 po BID
stable: outpt follow up: She also has chronic RML collapse: Outpt follow up: with her primary pulmonologist!

## 2018-09-12 NOTE — PROGRESS NOTE ADULT - PROBLEM SELECTOR PLAN 6
blood glucose seems reasonable!
blood glucose seems reasonable!
will monitor HR  no intervention at this time

## 2018-09-12 NOTE — PROGRESS NOTE ADULT - PROBLEM SELECTOR PLAN 1
seems to be getting better: No need to change to IV steroids: Would cont 60 mg today too, and decrease to 40 mg in AM  /: Doing much better: She has chronic RML collapse , she will follow with her pulmonologist as an outpt for the same

## 2018-09-12 NOTE — PROGRESS NOTE ADULT - SUBJECTIVE AND OBJECTIVE BOX
Patient is a 73y old  Female who presents with a chief complaint of shortness of breath and wheezing (10 Sep 2018 17:06)      Any change in ROS: still i n ER: says she is feeling little better: still with little wheezing!    MEDICATIONS  (STANDING):  ALBUTerol    90 MICROgram(s) HFA Inhaler 2 Puff(s) Inhalation every 6 hours  ALBUTerol/ipratropium for Nebulization 3 milliLiter(s) Nebulizer every 6 hours  apixaban 5 milliGRAM(s) Oral every 12 hours  aspirin enteric coated 81 milliGRAM(s) Oral daily  atorvastatin 20 milliGRAM(s) Oral at bedtime  dextrose 5%. 1000 milliLiter(s) (50 mL/Hr) IV Continuous <Continuous>  dextrose 50% Injectable 12.5 Gram(s) IV Push once  dextrose 50% Injectable 25 Gram(s) IV Push once  dextrose 50% Injectable 25 Gram(s) IV Push once  enalapril 10 milliGRAM(s) Oral two times a day  furosemide    Tablet 40 milliGRAM(s) Oral two times a day  influenza   Vaccine 0.5 milliLiter(s) IntraMuscular once  insulin lispro (HumaLOG) corrective regimen sliding scale   SubCutaneous three times a day before meals  magnesium oxide 400 milliGRAM(s) Oral three times a day with meals  montelukast 10 milliGRAM(s) Oral daily  pantoprazole    Tablet 40 milliGRAM(s) Oral before breakfast  predniSONE   Tablet 60 milliGRAM(s) Oral daily  spironolactone 12.5 milliGRAM(s) Oral daily  tiotropium 18 MICROgram(s) Capsule 1 Capsule(s) Inhalation daily    MEDICATIONS  (PRN):  dextrose 40% Gel 15 Gram(s) Oral once PRN Blood Glucose LESS THAN 70 milliGRAM(s)/deciliter  glucagon  Injectable 1 milliGRAM(s) IntraMuscular once PRN Glucose LESS THAN 70 milligrams/deciliter    Vital Signs Last 24 Hrs  T(C): 36.6 (11 Sep 2018 07:43), Max: 36.8 (10 Sep 2018 17:44)  T(F): 97.9 (11 Sep 2018 07:43), Max: 98.3 (10 Sep 2018 17:44)  HR: 83 (11 Sep 2018 07:43) (83 - 94)  BP: 107/64 (11 Sep 2018 07:43) (107/64 - 155/60)  BP(mean): --  RR: 18 (11 Sep 2018 07:43) (18 - 18)  SpO2: 96% (11 Sep 2018 07:43) (96% - 97%)    I&O's Summary        Physical Exam:   GENERAL: NAD, well-groomed, well-developed  HEENT: COLLEEN/   Atraumatic, Normocephalic  ENMT: No tonsillar erythema, exudates, or enlargement; Moist mucous membranes, Good dentition, No lesions  NECK: Supple, No JVD, Normal thyroid  CHEST/LUNG: Mild exp wheezing+  CVS: Regular rate and rhythm; No murmurs, rubs, or gallops  GI: : Soft, Nontender, Nondistended; Bowel sounds present  NERVOUS SYSTEM:  Alert & Oriented X3  EXTREMITIES:  2+ Peripheral Pulses, No clubbing, cyanosis, or edema  LYMPH: No lymphadenopathy noted  SKIN: No rashes or lesions  ENDOCRINOLOGY: No Thyromegaly  PSYCH: Appropriate    Labs:  29                            11.3   9.2   )-----------( 165      ( 10 Sep 2018 09:06 )             34.5     09-11    134<L>  |  98  |  30<H>  ----------------------------<  170<H>  4.2   |  24  |  1.14  -10    133<L>  |  100  |  22  ----------------------------<  143<H>  4.4   |  24  |  1.06    Ca    9.4      11 Sep 2018 06:13  Ca    9.0      10 Sep 2018 09:06      CAPILLARY BLOOD GLUCOSE      POCT Blood Glucose.: 197 mg/dL (11 Sep 2018 08:48)  POCT Blood Glucose.: 128 mg/dL (11 Sep 2018 06:45)  POCT Blood Glucose.: 245 mg/dL (10 Sep 2018 22:30)  POCT Blood Glucose.: 273 mg/dL (10 Sep 2018 18:30)  POCT Blood Glucose.: 261 mg/dL (10 Sep 2018 13:52)          Urinalysis Basic - ( 10 Sep 2018 09:06 )    Color: Light Yellow / Appearance: Clear / S.016 / pH: x  Gluc: x / Ketone: Negative  / Bili: Negative / Urobili: Negative   Blood: x / Protein: Negative / Nitrite: Negative   Leuk Esterase: Large / RBC: 2 /hpf / WBC 59 /hpf   Sq Epi: x / Non Sq Epi: 2 /hpf / Bacteria: Many      Serum Pro-Brain Natriuretic Peptide: 49 pg/mL (09-10 @ 09:06)        RECENT CULTURES:        RESPIRATORY CULTURES:      < from: CT Chest No Cont (09.10.18 @ 12:33) >    PROCEDURE:   CT of the chest was performed without intravenous contrast. Coronal and   sagittal reformats were created.    FINDINGS:    CHEST:     LUNGS AND LARGE AIRWAYS: Near complete collapse of the right middle lobe,   which is chronic and unchanged from prior examination. Bronchiectasis   seen in the bilateral lower lobes. 7 mm left upper lobe solid nodule   unchanged from prior examination.  PLEURA: No pleural effusion.  VESSELS: Main pulmonary artery is normal in size. The aorta is normal in   size and contour. Minimal atherosclerotic disease of the abdominal aorta.  HEART: Heart size is normal. No pericardial effusion. There is a   biventricular AICD in place.  MEDIASTINUM AND MARIETTA: No lymphadenopathy.  CHEST WALL AND LOWER NECK: The thyroid gland is unremarkable.  VISUALIZED UPPER ABDOMEN: Status post total cystectomy. Left   nephrolithiasis.  BONES: Stable degenerative changes of the spine.    IMPRESSION:     Unchanged appearance of near complete collapse of the right middlelobe.    Unchanged appearance of 7 mm left upper lobe solid nodule.    No evidence of pneumonia.                    HERSON AVILES M.D., RADIOLOGY RESIDENT  This document has been electronically signed.  MICHELE VIEIRA M.D., ATTENDING RADIOLOGIST  This document has been electronically signed. Sep 10 2018  4:09PM              < end of copied text >      Studies  Chest X-RAY  CT SCAN Chest   Venous Dopplers: LE:   CT Abdomen  Others
Patient is a 73y old  Female who presents with a chief complaint of shortness of breath and wheezing (11 Sep 2018 10:28)      SUBJECTIVE / OVERNIGHT EVENTS:    patient seen and examined at bedside.   no o/n events.  no sob.     ROS:  14 point ROS negative in detail except stated as above    MEDICATIONS  (STANDING):  ALBUTerol    90 MICROgram(s) HFA Inhaler 2 Puff(s) Inhalation every 6 hours  ALBUTerol/ipratropium for Nebulization 3 milliLiter(s) Nebulizer every 6 hours  apixaban 5 milliGRAM(s) Oral every 12 hours  aspirin enteric coated 81 milliGRAM(s) Oral daily  atorvastatin 20 milliGRAM(s) Oral at bedtime  dextrose 5%. 1000 milliLiter(s) (50 mL/Hr) IV Continuous <Continuous>  dextrose 50% Injectable 12.5 Gram(s) IV Push once  dextrose 50% Injectable 25 Gram(s) IV Push once  dextrose 50% Injectable 25 Gram(s) IV Push once  enalapril 10 milliGRAM(s) Oral two times a day  furosemide    Tablet 40 milliGRAM(s) Oral two times a day  influenza   Vaccine 0.5 milliLiter(s) IntraMuscular once  insulin lispro (HumaLOG) corrective regimen sliding scale   SubCutaneous three times a day before meals  magnesium oxide 400 milliGRAM(s) Oral three times a day with meals  montelukast 10 milliGRAM(s) Oral daily  pantoprazole    Tablet 40 milliGRAM(s) Oral before breakfast  predniSONE   Tablet 60 milliGRAM(s) Oral daily  spironolactone 12.5 milliGRAM(s) Oral daily  tiotropium 18 MICROgram(s) Capsule 1 Capsule(s) Inhalation daily    MEDICATIONS  (PRN):  dextrose 40% Gel 15 Gram(s) Oral once PRN Blood Glucose LESS THAN 70 milliGRAM(s)/deciliter  glucagon  Injectable 1 milliGRAM(s) IntraMuscular once PRN Glucose LESS THAN 70 milligrams/deciliter      CAPILLARY BLOOD GLUCOSE      POCT Blood Glucose.: 197 mg/dL (11 Sep 2018 08:48)  POCT Blood Glucose.: 128 mg/dL (11 Sep 2018 06:45)  POCT Blood Glucose.: 245 mg/dL (10 Sep 2018 22:30)  POCT Blood Glucose.: 273 mg/dL (10 Sep 2018 18:30)  POCT Blood Glucose.: 261 mg/dL (10 Sep 2018 13:52)    I&O's Summary      PHYSICAL EXAM:  Vital Signs Last 24 Hrs  T(C): 36.6 (11 Sep 2018 07:43), Max: 36.8 (10 Sep 2018 17:44)  T(F): 97.9 (11 Sep 2018 07:43), Max: 98.3 (10 Sep 2018 17:44)  HR: 83 (11 Sep 2018 07:43) (83 - 94)  BP: 107/64 (11 Sep 2018 07:43) (107/64 - 155/60)  BP(mean): --  RR: 18 (11 Sep 2018 07:43) (18 - 18)  SpO2: 96% (11 Sep 2018 07:43) (96% - 97%)        GEN: NAD  	Lungs:cta b/l decreased breath sounds at bases  	CVS: S1 S2 distant HS soft ejection systolic murmur best heard at left sternal border   	Abdomen: obese, no tenderness, no organomegaly, BS present  	Ext: no cyanosis or clubbing, no edema              LABS:                        11.0   x     )-----------( 184      ( 11 Sep 2018 09:12 )             33.8     09-11    134<L>  |  98  |  30<H>  ----------------------------<  170<H>  4.2   |  24  |  1.14    Ca    9.4      11 Sep 2018 06:13            Urinalysis Basic - ( 10 Sep 2018 09:06 )    Color: Light Yellow / Appearance: Clear / S.016 / pH: x  Gluc: x / Ketone: Negative  / Bili: Negative / Urobili: Negative   Blood: x / Protein: Negative / Nitrite: Negative   Leuk Esterase: Large / RBC: 2 /hpf / WBC 59 /hpf   Sq Epi: x / Non Sq Epi: 2 /hpf / Bacteria: Many          Care Discussed with Consultants/Other Providers:  JANAY strong
Patient is a 73y old  Female who presents with a chief complaint of shortness of breath and wheezing (11 Sep 2018 13:24)      Any change in ROS: Doing much better    MEDICATIONS  (STANDING):  ALBUTerol    90 MICROgram(s) HFA Inhaler 2 Puff(s) Inhalation every 6 hours  ALBUTerol/ipratropium for Nebulization 3 milliLiter(s) Nebulizer every 6 hours  apixaban 5 milliGRAM(s) Oral every 12 hours  aspirin enteric coated 81 milliGRAM(s) Oral daily  atorvastatin 20 milliGRAM(s) Oral at bedtime  dextrose 5%. 1000 milliLiter(s) (50 mL/Hr) IV Continuous <Continuous>  dextrose 50% Injectable 12.5 Gram(s) IV Push once  dextrose 50% Injectable 25 Gram(s) IV Push once  dextrose 50% Injectable 25 Gram(s) IV Push once  enalapril 10 milliGRAM(s) Oral two times a day  furosemide    Tablet 40 milliGRAM(s) Oral two times a day  insulin glargine Injectable (LANTUS) 12 Unit(s) SubCutaneous at bedtime  insulin lispro (HumaLOG) corrective regimen sliding scale   SubCutaneous at bedtime  insulin lispro (HumaLOG) corrective regimen sliding scale   SubCutaneous three times a day before meals  magnesium oxide 400 milliGRAM(s) Oral three times a day with meals  montelukast 10 milliGRAM(s) Oral daily  pantoprazole    Tablet 40 milliGRAM(s) Oral before breakfast  predniSONE   Tablet 40 milliGRAM(s) Oral once  spironolactone 12.5 milliGRAM(s) Oral daily    MEDICATIONS  (PRN):  dextrose 40% Gel 15 Gram(s) Oral once PRN Blood Glucose LESS THAN 70 milliGRAM(s)/deciliter  glucagon  Injectable 1 milliGRAM(s) IntraMuscular once PRN Glucose LESS THAN 70 milligrams/deciliter  guaiFENesin    Syrup 200 milliGRAM(s) Oral every 6 hours PRN Cough    Vital Signs Last 24 Hrs  T(C): 36.4 (12 Sep 2018 07:35), Max: 36.7 (11 Sep 2018 16:16)  T(F): 97.6 (12 Sep 2018 07:35), Max: 98.1 (12 Sep 2018 04:00)  HR: 79 (12 Sep 2018 07:35) (60 - 90)  BP: 101/67 (12 Sep 2018 07:35) (101/67 - 127/76)  BP(mean): --  RR: 18 (12 Sep 2018 07:35) (18 - 18)  SpO2: 100% (12 Sep 2018 07:35) (98% - 100%)    I&O's Summary        Physical Exam:   GENERAL: NAD, well-groomed, well-developed  HEENT: COLLEEN/   Atraumatic, Normocephalic  ENMT: No tonsillar erythema, exudates, or enlargement; Moist mucous membranes, Good dentition, No lesions  NECK: Supple, No JVD, Normal thyroid  CHEST/LUNG: Mild expiratory wheeze  CVS: Regular rate and rhythm; No murmurs, rubs, or gallops  GI: : Soft, Nontender, Nondistended; Bowel sounds present  NERVOUS SYSTEM:  Alert & Oriented X3, Good concentration; Motor Strength 5/5 B/L upper and lower extremities; DTRs 2+ intact and symmetric  EXTREMITIES:  2+ Peripheral Pulses, No clubbing, cyanosis, or edema  LYMPH: No lymphadenopathy noted  SKIN: No rashes or lesions  ENDOCRINOLOGY: No Thyromegaly  PSYCH: Appropriate    Labs:  29                            11.0   11.66 )-----------( 184      ( 11 Sep 2018 09:12 )             33.8                         11.3   9.2   )-----------( 165      ( 10 Sep 2018 09:06 )             34.5     09-11    134<L>  |  98  |  30<H>  ----------------------------<  170<H>  4.2   |  24  |  1.14  09-10    133<L>  |  100  |  22  ----------------------------<  143<H>  4.4   |  24  |  1.06    Ca    9.4      11 Sep 2018 06:13      CAPILLARY BLOOD GLUCOSE      POCT Blood Glucose.: 118 mg/dL (12 Sep 2018 08:59)  POCT Blood Glucose.: 247 mg/dL (12 Sep 2018 00:03)  POCT Blood Glucose.: 299 mg/dL (11 Sep 2018 21:09)  POCT Blood Glucose.: 287 mg/dL (11 Sep 2018 17:36)  POCT Blood Glucose.: 352 mg/dL (11 Sep 2018 12:51)            Serum Pro-Brain Natriuretic Peptide: 49 pg/mL (09-10 @ 09:06)        RECENT CULTURES:  09-10 @ 16:40 .Urine Clean Catch (Midstream)                50,000 - 99,000 CFU/mL Enterobacter cloacae/asburiae          RESPIRATORY CULTURES:          Studies  Chest X-RAY  CT SCAN Chest   Venous Dopplers: LE:   CT Abdomen  Others    < from: CT Chest No Cont (09.10.18 @ 12:33) >  agittal reformats were created.    FINDINGS:    CHEST:     LUNGS AND LARGE AIRWAYS: Near complete collapse of the right middle lobe,   which is chronic and unchanged from prior examination. Bronchiectasis   seen in the bilateral lower lobes. 7 mm left upper lobe solid nodule   unchanged from prior examination.  PLEURA: No pleural effusion.  VESSELS: Main pulmonary artery is normal in size. The aorta is normal in   size and contour. Minimal atherosclerotic disease of the abdominal aorta.  HEART: Heart size is normal. No pericardial effusion. There is a   biventricular AICD in place.  MEDIASTINUM AND MARIETTA: No lymphadenopathy.  CHEST WALL AND LOWER NECK: The thyroid gland is unremarkable.  VISUALIZED UPPER ABDOMEN: Status post total cystectomy. Left   nephrolithiasis.  BONES: Stable degenerative changes of the spine.    IMPRESSION:     Unchanged appearance of near complete collapse of the right middlelobe.    Unchanged appearance of 7 mm left upper lobe solid nodule.    No evidence of pneumonia.                    HERSON AVILES M.D., RADIOLOGY RESIDENT  This document has been electronically signed.  MICHELE VIEIRA M.D., ATTENDING RADIOLOGIST  This document has been electronically signed. Sep 10 2018  4:09PM    < end of copied text >

## 2018-09-12 NOTE — PROGRESS NOTE ADULT - PROBLEM SELECTOR PLAN 5
no clinical features of decompensated heart failure:
hold oral meds  check HbA1C  continue standing Lantus  c/w  Lantus to 12 units qhs
no clinical features of decompensated heart failure:

## 2018-09-14 ENCOUNTER — APPOINTMENT (OUTPATIENT)
Dept: ELECTROPHYSIOLOGY | Facility: CLINIC | Age: 73
End: 2018-09-14
Payer: MEDICARE

## 2018-09-14 DIAGNOSIS — I47.1 SUPRAVENTRICULAR TACHYCARDIA: ICD-10-CM

## 2018-09-14 PROCEDURE — 93284 PRGRMG EVAL IMPLANTABLE DFB: CPT

## 2018-09-26 ENCOUNTER — APPOINTMENT (OUTPATIENT)
Dept: CARDIOLOGY | Facility: CLINIC | Age: 73
End: 2018-09-26
Payer: MEDICARE

## 2018-09-26 VITALS
OXYGEN SATURATION: 95 % | DIASTOLIC BLOOD PRESSURE: 72 MMHG | SYSTOLIC BLOOD PRESSURE: 115 MMHG | HEIGHT: 62 IN | HEART RATE: 93 BPM | BODY MASS INDEX: 34.04 KG/M2 | WEIGHT: 185 LBS

## 2018-09-26 PROCEDURE — 99214 OFFICE O/P EST MOD 30 MIN: CPT

## 2018-10-08 ENCOUNTER — OTHER (OUTPATIENT)
Age: 73
End: 2018-10-08

## 2018-10-08 ENCOUNTER — APPOINTMENT (OUTPATIENT)
Dept: CARDIOLOGY | Facility: CLINIC | Age: 73
End: 2018-10-08
Payer: MEDICARE

## 2018-10-08 VITALS
HEIGHT: 62 IN | WEIGHT: 183 LBS | SYSTOLIC BLOOD PRESSURE: 112 MMHG | DIASTOLIC BLOOD PRESSURE: 70 MMHG | BODY MASS INDEX: 33.68 KG/M2 | OXYGEN SATURATION: 93 % | HEART RATE: 88 BPM

## 2018-10-08 PROCEDURE — 99214 OFFICE O/P EST MOD 30 MIN: CPT

## 2018-10-26 NOTE — ED ADULT NURSE NOTE - NS ED NURSE LEVEL OF CONSCIOUSNESS AFFECT
Assessment/Plan:     Problem List Items Addressed This Visit        Cardiovascular and Mediastinum    Essential hypertension - Primary     · Continue amlodipine 5 mg daily and cardizem at current dose  · Continue lisinopril at current dose  · Follow-up in 5 months         Relevant Medications    lisinopril (ZESTRIL) 20 mg tablet    amLODIPine (NORVASC) 5 mg tablet            Subjective:      Patient ID: Rosy Olivo is a 72 y o  male  HPI  Pt here for blood pressure check  In his last appt, he had was prescribed amlodipine and had his lisinopril increased  Says he did not take his medicine today cause he usually takes it with food  Had some coffee today but no food  However pressure is better today  No problem with the amlodipine  No chest pain or shortness of breath  The following portions of the patient's history were reviewed and updated as appropriate: allergies, current medications, past family history, past medical history, past social history, past surgical history and problem list     Review of Systems   HENT: Positive for congestion  Respiratory: Negative for chest tightness and shortness of breath  Cardiovascular: Negative for chest pain and palpitations  Objective:    /66 (BP Location: Right arm, Patient Position: Sitting, Cuff Size: Adult)   Pulse 80   Temp 98 °F (36 7 °C) (Oral)   Ht 5' 7" (1 702 m)   Wt 86 8 kg (191 lb 5 8 oz)   BMI 29 97 kg/m²      Physical Exam   Constitutional: He appears well-developed and well-nourished  No distress  Cardiovascular: Normal rate, regular rhythm, normal heart sounds and intact distal pulses  Pulmonary/Chest: Effort normal and breath sounds normal    Skin: He is not diaphoretic  Vitals reviewed  Calm

## 2018-10-27 ENCOUNTER — INPATIENT (INPATIENT)
Facility: HOSPITAL | Age: 73
LOS: 0 days | Discharge: HOME | DRG: 202 | End: 2018-10-28
Attending: HOSPITALIST | Admitting: HOSPITALIST
Payer: MEDICARE

## 2018-10-27 VITALS
HEIGHT: 62 IN | TEMPERATURE: 98 F | WEIGHT: 188.05 LBS | SYSTOLIC BLOOD PRESSURE: 103 MMHG | RESPIRATION RATE: 18 BRPM | DIASTOLIC BLOOD PRESSURE: 54 MMHG | HEART RATE: 93 BPM | OXYGEN SATURATION: 97 %

## 2018-10-27 DIAGNOSIS — I48.2 CHRONIC ATRIAL FIBRILLATION: ICD-10-CM

## 2018-10-27 DIAGNOSIS — R07.9 CHEST PAIN, UNSPECIFIED: ICD-10-CM

## 2018-10-27 DIAGNOSIS — J45.31 MILD PERSISTENT ASTHMA WITH (ACUTE) EXACERBATION: ICD-10-CM

## 2018-10-27 DIAGNOSIS — Z00.00 ENCOUNTER FOR GENERAL ADULT MEDICAL EXAMINATION WITHOUT ABNORMAL FINDINGS: ICD-10-CM

## 2018-10-27 DIAGNOSIS — E11.69 TYPE 2 DIABETES MELLITUS WITH OTHER SPECIFIED COMPLICATION: ICD-10-CM

## 2018-10-27 DIAGNOSIS — R07.1 CHEST PAIN ON BREATHING: ICD-10-CM

## 2018-10-27 DIAGNOSIS — I50.22 CHRONIC SYSTOLIC (CONGESTIVE) HEART FAILURE: ICD-10-CM

## 2018-10-27 LAB
ALBUMIN SERPL ELPH-MCNC: 3.8 G/DL — SIGNIFICANT CHANGE UP (ref 3.3–5)
ALP SERPL-CCNC: 84 U/L — SIGNIFICANT CHANGE UP (ref 40–120)
ALT FLD-CCNC: 13 U/L — SIGNIFICANT CHANGE UP (ref 10–45)
ANION GAP SERPL CALC-SCNC: 13 MMOL/L — SIGNIFICANT CHANGE UP (ref 5–17)
AST SERPL-CCNC: 16 U/L — SIGNIFICANT CHANGE UP (ref 10–40)
BASOPHILS # BLD AUTO: 0 K/UL — SIGNIFICANT CHANGE UP (ref 0–0.2)
BASOPHILS NFR BLD AUTO: 0.4 % — SIGNIFICANT CHANGE UP (ref 0–2)
BILIRUB SERPL-MCNC: 0.3 MG/DL — SIGNIFICANT CHANGE UP (ref 0.2–1.2)
BUN SERPL-MCNC: 25 MG/DL — HIGH (ref 7–23)
CALCIUM SERPL-MCNC: 9.7 MG/DL — SIGNIFICANT CHANGE UP (ref 8.4–10.5)
CHLORIDE SERPL-SCNC: 103 MMOL/L — SIGNIFICANT CHANGE UP (ref 96–108)
CK MB BLD-MCNC: 1.9 % — SIGNIFICANT CHANGE UP (ref 0–3.5)
CK MB CFR SERPL CALC: 4.3 NG/ML — HIGH (ref 0–3.8)
CK SERPL-CCNC: 224 U/L — HIGH (ref 25–170)
CO2 SERPL-SCNC: 24 MMOL/L — SIGNIFICANT CHANGE UP (ref 22–31)
CREAT SERPL-MCNC: 1.04 MG/DL — SIGNIFICANT CHANGE UP (ref 0.5–1.3)
EOSINOPHIL # BLD AUTO: 0.3 K/UL — SIGNIFICANT CHANGE UP (ref 0–0.5)
EOSINOPHIL NFR BLD AUTO: 3.1 % — SIGNIFICANT CHANGE UP (ref 0–6)
GAS PNL BLDV: SIGNIFICANT CHANGE UP
GLUCOSE BLDC GLUCOMTR-MCNC: 133 MG/DL — HIGH (ref 70–99)
GLUCOSE BLDC GLUCOMTR-MCNC: 145 MG/DL — HIGH (ref 70–99)
GLUCOSE BLDC GLUCOMTR-MCNC: 161 MG/DL — HIGH (ref 70–99)
GLUCOSE SERPL-MCNC: 143 MG/DL — HIGH (ref 70–99)
HCT VFR BLD CALC: 32.2 % — LOW (ref 34.5–45)
HGB BLD-MCNC: 10.8 G/DL — LOW (ref 11.5–15.5)
LYMPHOCYTES # BLD AUTO: 1.5 K/UL — SIGNIFICANT CHANGE UP (ref 1–3.3)
LYMPHOCYTES # BLD AUTO: 16.8 % — SIGNIFICANT CHANGE UP (ref 13–44)
MCHC RBC-ENTMCNC: 31.3 PG — SIGNIFICANT CHANGE UP (ref 27–34)
MCHC RBC-ENTMCNC: 33.4 GM/DL — SIGNIFICANT CHANGE UP (ref 32–36)
MCV RBC AUTO: 93.8 FL — SIGNIFICANT CHANGE UP (ref 80–100)
MONOCYTES # BLD AUTO: 0.6 K/UL — SIGNIFICANT CHANGE UP (ref 0–0.9)
MONOCYTES NFR BLD AUTO: 6.7 % — SIGNIFICANT CHANGE UP (ref 2–14)
NEUTROPHILS # BLD AUTO: 6.6 K/UL — SIGNIFICANT CHANGE UP (ref 1.8–7.4)
NEUTROPHILS NFR BLD AUTO: 73 % — SIGNIFICANT CHANGE UP (ref 43–77)
NT-PROBNP SERPL-SCNC: 48 PG/ML — SIGNIFICANT CHANGE UP (ref 0–300)
PLATELET # BLD AUTO: 172 K/UL — SIGNIFICANT CHANGE UP (ref 150–400)
POTASSIUM SERPL-MCNC: 4.6 MMOL/L — SIGNIFICANT CHANGE UP (ref 3.5–5.3)
POTASSIUM SERPL-SCNC: 4.6 MMOL/L — SIGNIFICANT CHANGE UP (ref 3.5–5.3)
PROT SERPL-MCNC: 7.1 G/DL — SIGNIFICANT CHANGE UP (ref 6–8.3)
RAPID RVP RESULT: SIGNIFICANT CHANGE UP
RBC # BLD: 3.44 M/UL — LOW (ref 3.8–5.2)
RBC # FLD: 13.9 % — SIGNIFICANT CHANGE UP (ref 10.3–14.5)
SODIUM SERPL-SCNC: 140 MMOL/L — SIGNIFICANT CHANGE UP (ref 135–145)
TROPONIN T, HIGH SENSITIVITY RESULT: 33 NG/L — SIGNIFICANT CHANGE UP (ref 0–51)
TROPONIN T, HIGH SENSITIVITY RESULT: 41 NG/L — SIGNIFICANT CHANGE UP (ref 0–51)
WBC # BLD: 9.1 K/UL — SIGNIFICANT CHANGE UP (ref 3.8–10.5)
WBC # FLD AUTO: 9.1 K/UL — SIGNIFICANT CHANGE UP (ref 3.8–10.5)

## 2018-10-27 PROCEDURE — 99223 1ST HOSP IP/OBS HIGH 75: CPT

## 2018-10-27 PROCEDURE — 71045 X-RAY EXAM CHEST 1 VIEW: CPT | Mod: 26

## 2018-10-27 PROCEDURE — 93010 ELECTROCARDIOGRAM REPORT: CPT

## 2018-10-27 PROCEDURE — 99285 EMERGENCY DEPT VISIT HI MDM: CPT | Mod: 25

## 2018-10-27 RX ORDER — TIOTROPIUM BROMIDE 18 UG/1
1 CAPSULE ORAL; RESPIRATORY (INHALATION) DAILY
Qty: 0 | Refills: 0 | Status: DISCONTINUED | OUTPATIENT
Start: 2018-10-27 | End: 2018-10-27

## 2018-10-27 RX ORDER — IPRATROPIUM/ALBUTEROL SULFATE 18-103MCG
3 AEROSOL WITH ADAPTER (GRAM) INHALATION ONCE
Qty: 0 | Refills: 0 | Status: DISCONTINUED | OUTPATIENT
Start: 2018-10-27 | End: 2018-10-27

## 2018-10-27 RX ORDER — TIOTROPIUM BROMIDE 18 UG/1
1 CAPSULE ORAL; RESPIRATORY (INHALATION) DAILY
Qty: 0 | Refills: 0 | Status: DISCONTINUED | OUTPATIENT
Start: 2018-10-27 | End: 2018-10-28

## 2018-10-27 RX ORDER — ALBUTEROL 90 UG/1
1 AEROSOL, METERED ORAL EVERY 4 HOURS
Qty: 0 | Refills: 0 | Status: DISCONTINUED | OUTPATIENT
Start: 2018-10-27 | End: 2018-10-27

## 2018-10-27 RX ORDER — MONTELUKAST 4 MG/1
10 TABLET, CHEWABLE ORAL DAILY
Qty: 0 | Refills: 0 | Status: DISCONTINUED | OUTPATIENT
Start: 2018-10-27 | End: 2018-10-28

## 2018-10-27 RX ORDER — IPRATROPIUM/ALBUTEROL SULFATE 18-103MCG
3 AEROSOL WITH ADAPTER (GRAM) INHALATION ONCE
Qty: 0 | Refills: 0 | Status: COMPLETED | OUTPATIENT
Start: 2018-10-27 | End: 2018-10-27

## 2018-10-27 RX ORDER — ALBUTEROL 90 UG/1
2 AEROSOL, METERED ORAL EVERY 6 HOURS
Qty: 0 | Refills: 0 | Status: DISCONTINUED | OUTPATIENT
Start: 2018-10-27 | End: 2018-10-27

## 2018-10-27 RX ORDER — POTASSIUM CHLORIDE 20 MEQ
6.67 PACKET (EA) ORAL DAILY
Qty: 0 | Refills: 0 | Status: DISCONTINUED | OUTPATIENT
Start: 2018-10-27 | End: 2018-10-28

## 2018-10-27 RX ORDER — FUROSEMIDE 40 MG
40 TABLET ORAL
Qty: 0 | Refills: 0 | Status: DISCONTINUED | OUTPATIENT
Start: 2018-10-27 | End: 2018-10-28

## 2018-10-27 RX ORDER — ACETAMINOPHEN 500 MG
650 TABLET ORAL ONCE
Qty: 0 | Refills: 0 | Status: COMPLETED | OUTPATIENT
Start: 2018-10-27 | End: 2018-10-27

## 2018-10-27 RX ORDER — INSULIN LISPRO 100/ML
2 VIAL (ML) SUBCUTANEOUS
Qty: 0 | Refills: 0 | Status: DISCONTINUED | OUTPATIENT
Start: 2018-10-27 | End: 2018-10-28

## 2018-10-27 RX ORDER — ASPIRIN/CALCIUM CARB/MAGNESIUM 324 MG
81 TABLET ORAL DAILY
Qty: 0 | Refills: 0 | Status: DISCONTINUED | OUTPATIENT
Start: 2018-10-27 | End: 2018-10-27

## 2018-10-27 RX ORDER — IPRATROPIUM/ALBUTEROL SULFATE 18-103MCG
3 AEROSOL WITH ADAPTER (GRAM) INHALATION EVERY 6 HOURS
Qty: 0 | Refills: 0 | Status: DISCONTINUED | OUTPATIENT
Start: 2018-10-27 | End: 2018-10-28

## 2018-10-27 RX ORDER — ASPIRIN/CALCIUM CARB/MAGNESIUM 324 MG
81 TABLET ORAL DAILY
Qty: 0 | Refills: 0 | Status: DISCONTINUED | OUTPATIENT
Start: 2018-10-27 | End: 2018-10-28

## 2018-10-27 RX ORDER — MAGNESIUM OXIDE 400 MG ORAL TABLET 241.3 MG
400 TABLET ORAL
Qty: 0 | Refills: 0 | Status: DISCONTINUED | OUTPATIENT
Start: 2018-10-27 | End: 2018-10-28

## 2018-10-27 RX ORDER — ATORVASTATIN CALCIUM 80 MG/1
20 TABLET, FILM COATED ORAL AT BEDTIME
Qty: 0 | Refills: 0 | Status: DISCONTINUED | OUTPATIENT
Start: 2018-10-27 | End: 2018-10-28

## 2018-10-27 RX ORDER — IPRATROPIUM/ALBUTEROL SULFATE 18-103MCG
3 AEROSOL WITH ADAPTER (GRAM) INHALATION ONCE
Qty: 0 | Refills: 0 | Status: DISCONTINUED | OUTPATIENT
Start: 2018-10-27 | End: 2018-10-28

## 2018-10-27 RX ORDER — APIXABAN 2.5 MG/1
5 TABLET, FILM COATED ORAL EVERY 12 HOURS
Qty: 0 | Refills: 0 | Status: DISCONTINUED | OUTPATIENT
Start: 2018-10-27 | End: 2018-10-28

## 2018-10-27 RX ORDER — SPIRONOLACTONE 25 MG/1
25 TABLET, FILM COATED ORAL DAILY
Qty: 0 | Refills: 0 | Status: DISCONTINUED | OUTPATIENT
Start: 2018-10-27 | End: 2018-10-28

## 2018-10-27 RX ORDER — ASPIRIN/CALCIUM CARB/MAGNESIUM 324 MG
243 TABLET ORAL ONCE
Qty: 0 | Refills: 0 | Status: COMPLETED | OUTPATIENT
Start: 2018-10-27 | End: 2018-10-27

## 2018-10-27 RX ORDER — INSULIN LISPRO 100/ML
VIAL (ML) SUBCUTANEOUS
Qty: 0 | Refills: 0 | Status: DISCONTINUED | OUTPATIENT
Start: 2018-10-27 | End: 2018-10-28

## 2018-10-27 RX ORDER — INSULIN GLARGINE 100 [IU]/ML
12 INJECTION, SOLUTION SUBCUTANEOUS AT BEDTIME
Qty: 0 | Refills: 0 | Status: DISCONTINUED | OUTPATIENT
Start: 2018-10-27 | End: 2018-10-28

## 2018-10-27 RX ORDER — PANTOPRAZOLE SODIUM 20 MG/1
40 TABLET, DELAYED RELEASE ORAL
Qty: 0 | Refills: 0 | Status: DISCONTINUED | OUTPATIENT
Start: 2018-10-27 | End: 2018-10-28

## 2018-10-27 RX ADMIN — Medication 3 MILLILITER(S): at 15:45

## 2018-10-27 RX ADMIN — Medication 3 MILLILITER(S): at 21:15

## 2018-10-27 RX ADMIN — ATORVASTATIN CALCIUM 20 MILLIGRAM(S): 80 TABLET, FILM COATED ORAL at 22:40

## 2018-10-27 RX ADMIN — Medication 243 MILLIGRAM(S): at 15:46

## 2018-10-27 RX ADMIN — INSULIN GLARGINE 12 UNIT(S): 100 INJECTION, SOLUTION SUBCUTANEOUS at 23:09

## 2018-10-27 NOTE — H&P ADULT - ASSESSMENT
Patient is a 73 year old  female with systolic congestive heart failure with AICD, asthma (intubated once in the past; here one month ago with asthma exacerbation in the setting of rhinovirus), atrial fibrillation on eliquis, and DM2 presents to the ED with worsening wheezing for the last five days admitted for mild asthma exacerbation.

## 2018-10-27 NOTE — H&P ADULT - NSHPLABSRESULTS_GEN_ALL_CORE
.  LABS:                         10.8   9.1   )-----------( 172      ( 27 Oct 2018 14:58 )             32.2     10-27    140  |  103  |  25<H>  ----------------------------<  143<H>  4.6   |  24  |  1.04    Ca    9.7      27 Oct 2018 14:58  Phos  4.0     10-27  Mg     1.7     10-27    TPro  7.1  /  Alb  3.8  /  TBili  0.3  /  DBili  x   /  AST  16  /  ALT  13  /  AlkPhos  84  10-27        Serum Pro-Brain Natriuretic Peptide: 48 pg/mL (10-27 @ 14:58)        RADIOLOGY, EKG & ADDITIONAL TESTS: Reviewed.     cxr- lungs clear  ekg- no acute st-t changes

## 2018-10-27 NOTE — PATIENT PROFILE ADULT - STATED REASON FOR ADMISSION
c/o left undrebreast pressure  started the night before, 4/10 in severity, relieved by heating pads.

## 2018-10-27 NOTE — ED ADULT NURSE NOTE - NSIMPLEMENTINTERV_GEN_ALL_ED
Implemented All Fall Risk Interventions:  [place] to call system. Call bell, personal items and telephone within reach. Instruct patient to call for assistance. Room bathroom lighting operational. Non-slip footwear when patient is off stretcher. Physically safe environment: no spills, clutter or unnecessary equipment. Stretcher in lowest position, wheels locked, appropriate side rails in place. Provide visual cue, wrist band, yellow gown, etc. Monitor gait and stability. Monitor for mental status changes and reorient to person, place, and time. Review medications for side effects contributing to fall risk. Reinforce activity limits and safety measures with patient and family.

## 2018-10-27 NOTE — H&P ADULT - PROBLEM SELECTOR PLAN 4
- controlled (for now, careful with steroids)  - start carbohydrate controlled diet  - fs achs  - c/w lantus and pre meal novolog with insulin sliding scale

## 2018-10-27 NOTE — ED PROVIDER NOTE - MEDICAL DECISION MAKING DETAILS
72 yo F c PMH of afib on eloquis, CHF (s/p PPM, battery replaced 1 y/a), DM, asthma (hx of intubation, ICU, trach/peg 2 y/a, all resolved), COPD p/w 1 day hx of 5/10 in severity chest pressure under L breast and on L arm that ocurred twice at rest lasting 30 minutes each time, also with increased SOB, SILVA, cough, non-bloody sputum production for past 2 months with "weather changes." On exam, /54, VS otherwise wnl, pt appears in NAD, has RLL rales posteriorly, BLE pitting edema. CXR wnl, EKG, labs pending. will reassess.

## 2018-10-27 NOTE — H&P ADULT - PROBLEM SELECTOR PLAN 3
- not in acute exacerbation  - c/w lasix BID and potassium supplementation  - no need to echo at this time  - c/w tele monitoring  - c/w aspirin, statin, ace inhibitor and spironlactone

## 2018-10-27 NOTE — H&P ADULT - NSHPPHYSICALEXAM_GEN_ALL_CORE
Vital Signs Last 24 Hrs  T(C): 36.5 (27 Oct 2018 18:33), Max: 36.7 (27 Oct 2018 17:05)  T(F): 97.7 (27 Oct 2018 18:33), Max: 98 (27 Oct 2018 17:05)  HR: 87 (27 Oct 2018 18:33) (87 - 93)  BP: 98/61 (27 Oct 2018 18:33) (98/61 - 106/63)  BP(mean): --  RR: 18 (27 Oct 2018 18:33) (18 - 20)  SpO2: 97% (27 Oct 2018 18:33) (97% - 97%)    GENERAL: NAD, well-developed  HEAD:  Atraumatic, Normocephalic  EYES: EOMI, PERRLA, conjunctiva and sclera clear  ENT: Pharynx not erythematous  PULMONARY: scant wheezing b/l   CARDIOVASCULAR: Regular rate and rhythm; No murmurs, rubs, or gallops  ABDOMEN: Soft, Nontender, Nondistended; Bowel sounds present  EXTREMITIES:  2+ Peripheral Pulses, No clubbing, cyanosis  MUSCULOSKELETAL: no calf tenderness, trace edema up to mid shin b/l   PSYCH: AAOx3, normal affect  SKIN: warm and dry, No rashes or lesions

## 2018-10-27 NOTE — ED ADULT NURSE NOTE - PSH
AICD (Automatic Cardioverter/Defibrillator) Present  inserted in Aug, 2008. Due for battery change in 1 month. ( [employer]) . Inserted by Dr Duffy  S/P cholecystectomy

## 2018-10-27 NOTE — ED PROVIDER NOTE - ATTENDING CONTRIBUTION TO CARE
****ATTENDING**** 74yo f hx afib on eliquis, CHF, COPD, Asthma BIB daughter states she had dull pain in her left arm and chest last night, and this morning and L sided chest pain again. During pain, pt denies episode of n/v, palp, sob, dizziness. denies recent illness, travel, change in meds. states she feels fine at this time. cards Dr. mendoza. + chronic cough, LE edema stable  On exam, Patient is awake,alert,oriented x 3. Patient is well appearing and in no acute distress. Patient's chest is clear to ausculation, +s1s2. Abdomen is soft nd/nt +BS. Extremity with trace swelling b/l.   EKG reviewed and paced. Check Labs, xray chest, eval for acs.

## 2018-10-27 NOTE — H&P ADULT - NSHPREVIEWOFSYSTEMS_GEN_ALL_CORE
GENERAL: No fevers, no chills.  EYES: No blurry vision,  No photophobia  ENT: No sore throat.  No dysphagia  Cardiovascular: No chest pain, palpitations, orthopnea  Pulmonary: + mild cough, + wheezing. + shortness of breath  Gastrointestinal: No abdominal pain, no diarrhea, no constipation.  No melena.  No hematochezia  : No dysuria.  No hematuria  Musculoskeletal: No weakness.  No myalgias.  Neuro: No Headache.  No vertigo.  No dizziness.  Psych: No anxiety, no depression.  Denies suicidal thoughts.

## 2018-10-27 NOTE — ED ADULT NURSE NOTE - OBJECTIVE STATEMENT
Patient presents to Ed with c/o pressure under left breast and left arm. Patient with extensive medical hx reports experiencing  pressure under her left breast and lt arm last night and this am for approximately 30 minutes time. Patient A&Ox3, denies pain  on arrival to ED. +sob on exertion, +productive cough, no fever chills headache dizziness nausea vomiting or diarrhea. Patient  has bilat lower extremity edema, LAC 20g iv lock placed, labs drawn and sent.

## 2018-10-27 NOTE — ED PROVIDER NOTE - CARDIAC, MLM
Normal rate, regular rhythm.  Heart sounds S1, S2.  No murmurs, rubs or gallops. 2+ pitting edema BLE

## 2018-10-27 NOTE — H&P ADULT - HISTORY OF PRESENT ILLNESS
Patient is a 73 year old  female with systolic congestive heart failure with AICD, asthma (intubated once in the past; here one month ago with asthma exacerbation in the setting of rhinovirus), atrial fibrillation on eliquis, and DM2 presents to the ED with worsening wheezing for the last five days. Patient remarks that along with the wheezing she also feels shortness of breath especially when outside (she attributes this to the "cool air"). She admits to chest pain with deep inspiration. Pain is reproducible, and improves with a heating pad. She denies worsening of lower extremity edema and is able to lay flat without any issues. She does have a mild productive cough but this is chronic and has not worsened. She denies fevers, chills. No sick contacts, no medication or diet non compliance, no recent travel, though she was hospitalized at [facility] about six weeks ago. She saw her pulmonologist Dr. Kirill Daily  three days prior to admission with these symptoms and he made no changes to her management. The main reason she came into the hospital today is to make sure shes "okay" so that [place] Patient is a 73 year old  female with systolic congestive heart failure with AICD, asthma (intubated once in the past; here one month ago with asthma exacerbation in the setting of rhinovirus), atrial fibrillation on eliquis, and DM2 presents to the ED with worsening wheezing for the last five days. Patient remarks that along with the wheezing she also feels shortness of breath especially when outside (she attributes this to the "cool air"). She admits to chest pain with deep inspiration. Pain is reproducible, and improves with a heating pad. She denies worsening of lower extremity edema and is able to lay flat without any issues. She does have a mild productive cough but this is chronic and has not worsened. She denies fevers, chills. No sick contacts, no medication or diet non compliance, no recent travel, though she was hospitalized at [facility] about six weeks ago. She saw her pulmonologist Dr. Kirill Daily  three days prior to admission with these symptoms and he made no changes to her management. The main reason she came into the hospital today is to make sure shes "okay" so that she wont [place].     In the ED, given a total of aspirin 325 mg once. VSS. Labs unremarkable- trops flat x1. rvp negative. cxr unremarkable. ekg with no acute st-t changes.     Daughter at bedside.

## 2018-10-27 NOTE — H&P ADULT - PSH
AICD (Automatic Cardioverter/Defibrillator) Present  inserted in Aug, 2008. Due for battery change in 1 month. ( [employer]) . Inserted by Dr Duffy  S/P cholecystectomy

## 2018-10-27 NOTE — CHART NOTE - NSCHARTNOTEFT_GEN_A_CORE
MEDICINE NP    ANDRE BARKER  73y Female    Patient is a 73y old  Female who presents with a chief complaint of wheezing (27 Oct 2018 18:45)       > Event Summary:  Notified by RN, Patient with increasing coughing after exposure to Sani-cloth.  Patient seen at bedside w/ daughter present reports coughing, now improving after using home rescue inhaler.   Denies sob, chest congestion, throat tightness, rhinorrhea. C/o headache from cough.    -Vital Signs : T(C): 36.5  T(F): 97.7  HR: 97 BP: 113/71 RR: 18 SpO2: 97% (27 Oct 2018 20:29)    -Physical Exam  Respiration unlabored, w/ +cough, lungs cta, no wheezing or rhonchi.  Throat c/d, no swelling.  Speech clear.      > Assessment & Plan:   1) Cough /Bronchospasm 2/2 Allergen Induced  -Duoneb x1  and PRN  -O2 NC prn and keep SpO2 > 92%  -Avoid Allergens and Sani-cloth use.  Private room when available  -Patient feeling much better on re-eval, c/w monitoring and current management   -D/w RN and Nursing supervisor      WILL Askew-BC  Medicine Department  #[id number]

## 2018-10-27 NOTE — H&P ADULT - PROBLEM SELECTOR PLAN 2
- see above  - chest pain is likely more so costochondritis (resolves with heating pad, reproducible with palpation)  - prn tylenol for pain control

## 2018-10-27 NOTE — H&P ADULT - PROBLEM SELECTOR PLAN 1
- scant wheezing b/l. Patient apparently has hallucinations 2' solumedrol. Will start prednisone 40 mg po daily x 5 days. Start duonebs prn q6h. No need for supplemental oxygen at this point.  - shortness of breath unlikely 2' ACS. Description of chest pain is most fitted for a musculoskeletal picture. However, will trend cardiac enzymes x 3. First troponin flat. No acute ekg changes.   - shortness of breath unlikely related to congestive heart failure. Patient euvolemic. pro-bnp WNL.  - shortness of breath unlikely infectious in nature. Sputum production not more than baseline, no fevers, clear cxr. No signs of infection on labs. Hold off on any antibiotics.

## 2018-10-27 NOTE — ED PROVIDER NOTE - PROGRESS NOTE DETAILS
Machelle ARAGON: troponin elevated, EKG with paced rhythm no ST elevations will get repeat 3 hour troponin and admit for further ACS r/o due to high HEART scores and risk factors. Dr. Hobson called and cardiology (non-emergent) paged, awaiting callbacks

## 2018-10-27 NOTE — ED PROVIDER NOTE - PSH
AICD (Automatic Cardioverter/Defibrillator) Present  inserted in Aug, 2008. Due for battery change in 1 month. ( [employer]) . Inserted by Dr Duffy  S/P cholecystectomy

## 2018-10-27 NOTE — ED PROVIDER NOTE - OBJECTIVE STATEMENT
74 yo F c PMH of afib on eloquis, CHF (s/p PPM, battery replaced 1 y/a), DM, asthma (hx of intubation, ICU, trach/peg 2 y/a, all resolved), COPD p/w 1 day hx of 5/10 in severity chest pressure under L breast and on L arm that ocurred twice at rest lasting 30 minutes each time. no hx of sx. pt has had recent increased BLE swelling and increased SOB that is worse on exertion, which often happens with "weather changes" - over past 2 months, associated with cough, non-bloody sputum production. no recent travel.    ROS positive: CP, SOB, SILVA, BLE edema, sputum production, cough  ROS negative: f/c, congestion, coryza, pharyngitis, hemoptysis, n/v, dysuria, hematuria, PND, orthopnea

## 2018-10-28 ENCOUNTER — TRANSCRIPTION ENCOUNTER (OUTPATIENT)
Age: 73
End: 2018-10-28

## 2018-10-28 VITALS — WEIGHT: 186.29 LBS

## 2018-10-28 DIAGNOSIS — I10 ESSENTIAL (PRIMARY) HYPERTENSION: ICD-10-CM

## 2018-10-28 DIAGNOSIS — R07.9 CHEST PAIN, UNSPECIFIED: ICD-10-CM

## 2018-10-28 DIAGNOSIS — I50.20 UNSPECIFIED SYSTOLIC (CONGESTIVE) HEART FAILURE: ICD-10-CM

## 2018-10-28 DIAGNOSIS — I48.92 UNSPECIFIED ATRIAL FLUTTER: ICD-10-CM

## 2018-10-28 DIAGNOSIS — Z29.9 ENCOUNTER FOR PROPHYLACTIC MEASURES, UNSPECIFIED: ICD-10-CM

## 2018-10-28 LAB
ANION GAP SERPL CALC-SCNC: 13 MMOL/L — SIGNIFICANT CHANGE UP (ref 5–17)
BUN SERPL-MCNC: 23 MG/DL — SIGNIFICANT CHANGE UP (ref 7–23)
CALCIUM SERPL-MCNC: 9.2 MG/DL — SIGNIFICANT CHANGE UP (ref 8.4–10.5)
CHLORIDE SERPL-SCNC: 100 MMOL/L — SIGNIFICANT CHANGE UP (ref 96–108)
CO2 SERPL-SCNC: 24 MMOL/L — SIGNIFICANT CHANGE UP (ref 22–31)
CREAT SERPL-MCNC: 1.02 MG/DL — SIGNIFICANT CHANGE UP (ref 0.5–1.3)
GLUCOSE BLDC GLUCOMTR-MCNC: 145 MG/DL — HIGH (ref 70–99)
GLUCOSE BLDC GLUCOMTR-MCNC: 265 MG/DL — HIGH (ref 70–99)
GLUCOSE SERPL-MCNC: 131 MG/DL — HIGH (ref 70–99)
HCT VFR BLD CALC: 31.5 % — LOW (ref 34.5–45)
HGB BLD-MCNC: 10.2 G/DL — LOW (ref 11.5–15.5)
MCHC RBC-ENTMCNC: 30.8 PG — SIGNIFICANT CHANGE UP (ref 27–34)
MCHC RBC-ENTMCNC: 32.4 GM/DL — SIGNIFICANT CHANGE UP (ref 32–36)
MCV RBC AUTO: 95.2 FL — SIGNIFICANT CHANGE UP (ref 80–100)
PLATELET # BLD AUTO: 177 K/UL — SIGNIFICANT CHANGE UP (ref 150–400)
POTASSIUM SERPL-MCNC: 4.1 MMOL/L — SIGNIFICANT CHANGE UP (ref 3.5–5.3)
POTASSIUM SERPL-SCNC: 4.1 MMOL/L — SIGNIFICANT CHANGE UP (ref 3.5–5.3)
RBC # BLD: 3.31 M/UL — LOW (ref 3.8–5.2)
RBC # FLD: 15.7 % — HIGH (ref 10.3–14.5)
SODIUM SERPL-SCNC: 137 MMOL/L — SIGNIFICANT CHANGE UP (ref 135–145)
TROPONIN T, HIGH SENSITIVITY RESULT: 34 NG/L — SIGNIFICANT CHANGE UP (ref 0–51)
WBC # BLD: 7.72 K/UL — SIGNIFICANT CHANGE UP (ref 3.8–10.5)
WBC # FLD AUTO: 7.72 K/UL — SIGNIFICANT CHANGE UP (ref 3.8–10.5)

## 2018-10-28 PROCEDURE — 99223 1ST HOSP IP/OBS HIGH 75: CPT

## 2018-10-28 PROCEDURE — 99239 HOSP IP/OBS DSCHRG MGMT >30: CPT

## 2018-10-28 RX ORDER — ALBUTEROL 90 UG/1
2 AEROSOL, METERED ORAL
Qty: 0 | Refills: 0 | DISCHARGE
Start: 2018-10-28

## 2018-10-28 RX ORDER — ACETAMINOPHEN 500 MG
2 TABLET ORAL
Qty: 0 | Refills: 0 | COMMUNITY

## 2018-10-28 RX ORDER — ALBUTEROL 90 UG/1
2 AEROSOL, METERED ORAL
Qty: 1 | Refills: 0
Start: 2018-10-28 | End: 2018-11-26

## 2018-10-28 RX ORDER — ALBUTEROL 90 UG/1
2 AEROSOL, METERED ORAL
Qty: 0 | Refills: 0 | COMMUNITY

## 2018-10-28 RX ADMIN — MAGNESIUM OXIDE 400 MG ORAL TABLET 400 MILLIGRAM(S): 241.3 TABLET ORAL at 13:04

## 2018-10-28 RX ADMIN — Medication 40 MILLIGRAM(S): at 06:13

## 2018-10-28 RX ADMIN — Medication 6.67 MILLIEQUIVALENT(S): at 13:04

## 2018-10-28 RX ADMIN — Medication 81 MILLIGRAM(S): at 13:06

## 2018-10-28 RX ADMIN — Medication 3 MILLILITER(S): at 01:00

## 2018-10-28 RX ADMIN — PANTOPRAZOLE SODIUM 40 MILLIGRAM(S): 20 TABLET, DELAYED RELEASE ORAL at 06:13

## 2018-10-28 RX ADMIN — Medication 2 UNIT(S): at 12:09

## 2018-10-28 RX ADMIN — Medication 2 UNIT(S): at 08:34

## 2018-10-28 RX ADMIN — SPIRONOLACTONE 25 MILLIGRAM(S): 25 TABLET, FILM COATED ORAL at 06:13

## 2018-10-28 RX ADMIN — Medication 40 MILLIGRAM(S): at 06:12

## 2018-10-28 RX ADMIN — APIXABAN 5 MILLIGRAM(S): 2.5 TABLET, FILM COATED ORAL at 06:12

## 2018-10-28 RX ADMIN — MONTELUKAST 10 MILLIGRAM(S): 4 TABLET, CHEWABLE ORAL at 13:04

## 2018-10-28 RX ADMIN — MAGNESIUM OXIDE 400 MG ORAL TABLET 400 MILLIGRAM(S): 241.3 TABLET ORAL at 08:38

## 2018-10-28 RX ADMIN — Medication 3: at 12:09

## 2018-10-28 RX ADMIN — Medication 10 MILLIGRAM(S): at 06:12

## 2018-10-28 RX ADMIN — Medication 3 MILLILITER(S): at 06:12

## 2018-10-28 RX ADMIN — Medication 3 MILLILITER(S): at 13:04

## 2018-10-28 NOTE — DISCHARGE NOTE ADULT - MEDICATION SUMMARY - MEDICATIONS TO TAKE
I will START or STAY ON the medications listed below when I get home from the hospital:    predniSONE 20 mg oral tablet  -- 2 tab(s) by mouth once a day  -- Indication: For Asthma exacerbation, non-allergic, mild persistent    spironolactone 25 mg oral tablet  -- 0.5 tab(s) by mouth once a day   -- It is very important that you take or use this exactly as directed.  Do not skip doses or discontinue unless directed by your doctor.  May cause drowsiness or dizziness.    -- Indication: For Chronic systolic congestive heart failure    Aspirin Enteric Coated 81 mg oral delayed release tablet  -- 1 tab(s) by mouth once a day  -- Indication: For Prophylactic measure    enalapril 10 mg oral tablet  -- 1 tab(s) by mouth 2 times a day  -- Indication: For Hypertension    apixaban 5 mg oral tablet  -- 1 tab(s) by mouth every 12 hours  -- Indication: For Atrial fibrillation, chronic    repaglinide 0.5 mg oral tablet  -- 1 tab(s) by mouth 3 times a day (before meals)  -- Indication: For Diabetes mellitus type 2 in obese    Lantus Solostar Pen 100 units/mL subcutaneous solution  -- 12 unit(s) subcutaneous once a day (at bedtime)  -- Indication: For Diabetes mellitus type 2 in obese    HumaLOG KwikPen 100 units/mL injectable solution  -- 1 milliliter(s) injectable 3 times a day   Adjust as per sliding scale  0-250 - 0 units  251-300- 1 u  301-350 -2 u  351- 400 - 3 u  -- Indication: For Diabetes mellitus type 2 in obese    atorvastatin 20 mg oral tablet  -- 1 tab(s) by mouth once a day (at bedtime)  -- Indication: For Prophylactic measure    tiotropium 18 mcg inhalation capsule  -- 1 cap(s) inhaled once a day  -- Indication: For Asthma exacerbation, non-allergic, mild persistent    Ventolin HFA 90 mcg/inh inhalation aerosol  -- 2 puff(s) inhaled 4 times a day, As Needed   -- Indication: For Asthma exacerbation, non-allergic, mild persistent    furosemide 40 mg oral tablet  -- 1 tab(s) by mouth 2 times a day  -- Indication: For Chronic systolic congestive heart failure    montelukast 10 mg oral tablet  -- 1 tab(s) by mouth once a day  -- Indication: For Asthma exacerbation, non-allergic, mild persistent    potassium chloride 20 mEq/15 mL oral liquid  -- 5 milliliter(s) by mouth once a day  -- Indication: For electrolyte    magnesium oxide 500 mg oral tablet  -- 1 tab(s) by mouth 3 times a day  -- Indication: For electrolyte    omeprazole 40 mg oral delayed release capsule  -- 1 cap(s) by mouth once a day  -- Indication: For Acid reflux I will START or STAY ON the medications listed below when I get home from the hospital:    predniSONE 20 mg oral tablet  -- 2 tab(s) by mouth once a day  -- Indication: For Asthma exacerbation, non-allergic, mild persistent    spironolactone 25 mg oral tablet  -- 0.5 tab(s) by mouth once a day   -- It is very important that you take or use this exactly as directed.  Do not skip doses or discontinue unless directed by your doctor.  May cause drowsiness or dizziness.    -- Indication: For Chronic systolic congestive heart failure    Aspirin Enteric Coated 81 mg oral delayed release tablet  -- 1 tab(s) by mouth once a day  -- Indication: For Prophylactic measure    enalapril 10 mg oral tablet  -- 1 tab(s) by mouth 2 times a day  -- Indication: For Hypertension    apixaban 5 mg oral tablet  -- 1 tab(s) by mouth every 12 hours  -- Indication: For Atrial fibrillation, chronic    repaglinide 0.5 mg oral tablet  -- 1 tab(s) by mouth 3 times a day (before meals)  -- Indication: For Diabetes mellitus type 2 in obese    Lantus Solostar Pen 100 units/mL subcutaneous solution  -- 12 unit(s) subcutaneous once a day (at bedtime)  -- Indication: For Diabetes mellitus type 2 in obese    HumaLOG KwikPen 100 units/mL injectable solution  -- 1 milliliter(s) injectable 3 times a day   Adjust as per sliding scale  0-250 - 0 units  251-300- 1 u  301-350 -2 u  351- 400 - 3 u  -- Indication: For Diabetes mellitus type 2 in obese    atorvastatin 20 mg oral tablet  -- 1 tab(s) by mouth once a day (at bedtime)  -- Indication: For Prophylactic measure    tiotropium 18 mcg inhalation capsule  -- 1 cap(s) inhaled once a day  -- Indication: For Asthma exacerbation, non-allergic, mild persistent    Ventolin HFA 90 mcg/inh inhalation aerosol  -- 2 puff(s) inhaled 4 times a day, As Needed   -- Indication: For Asthma exacerbation, non-allergic, mild persistent    furosemide 40 mg oral tablet  -- 1 tab(s) by mouth 2 times a day  -- Indication: For Chronic systolic congestive heart failure    montelukast 10 mg oral tablet  -- 1 tab(s) by mouth once a day  -- Indication: For Asthma exacerbation, non-allergic, mild persistent    potassium chloride 20 mEq/15 mL oral liquid  -- 5 milliliter(s) by mouth once a day  -- Indication: For electrolyte    magnesium oxide 500 mg oral tablet  -- 1 tab(s) by mouth 3 times a day  -- Indication: For electrolyte    omeprazole 40 mg oral delayed release capsule  -- 1 cap(s) by mouth once a day  -- Indication: For Gerd

## 2018-10-28 NOTE — PROVIDER CONTACT NOTE (OTHER) - RECOMMENDATIONS
Duoneb x 1 dose prn, oxygen 3 L prn, avoid allergen and Sani-cloth use, move to private room when available, nursing supervisor made aware

## 2018-10-28 NOTE — CONSULT NOTE ADULT - PROBLEM SELECTOR RECOMMENDATION 9
Atypical chest pressure and left arm pain relieved with heating pad. Currently asymptomatic. No associated cardiac symptoms. Cardiac cath 3/8/18 normal coronaries. Likely noncardiac in nature. Continue ASA 81mg po daily, atorvastatin 20mg po daily.

## 2018-10-28 NOTE — DISCHARGE NOTE ADULT - PATIENT PORTAL LINK FT
You can access the [facility] Patient Portal, offered by [facility], by registering with the following website: [email or web address]

## 2018-10-28 NOTE — DISCHARGE NOTE ADULT - CARE PROVIDERS DIRECT ADDRESSES
,DirectAddress_Unknown,[email or web address]

## 2018-10-28 NOTE — DISCHARGE NOTE ADULT - CARE PLAN
Principal Discharge DX:	Chest pain  Goal:	resolution  Assessment and plan of treatment:	HOME CARE INSTRUCTIONS  For the next few days, avoid physical activities that bring on chest pain. Continue physical activities as directed.  Do not smoke.  Avoid drinking alcohol.   Only take over-the-counter or prescription medicine for pain, discomfort, or fever as directed by your caregiver.  Follow your caregiver's suggestions for further testing if your chest pain does not go away.  Keep any follow-up appointments you made. If you do not go to an appointment, you could develop lasting (chronic) problems with pain. If there is any problem keeping an appointment, you must call to reschedule.   SEEK MEDICAL CARE IF:  You think you are having problems from the medicine you are taking. Read your medicine instructions carefully.  Your chest pain does not go away, even after treatment.  You develop a rash with blisters on your chest.  SEEK IMMEDIATE MEDICAL CARE IF:  You have increased chest pain or pain that spreads to your arm, neck, jaw, back, or abdomen.   You develop shortness of breath, an increasing cough, or you are coughing up blood.  You have severe back or abdominal pain, feel nauseous, or vomit.  You develop severe weakness, fainting, or chills.  You have a fever.  THIS IS AN EMERGENCY. Do not wait to see if the pain will go away. Get medical help at once. Call your local emergency services (_____________________). Do not drive yourself to the hospital.  Secondary Diagnosis:	Asthma exacerbation, non-allergic, mild persistent  Assessment and plan of treatment:	Asthma attacks happen when the airways in the lungs become narrow and inflamed  Asthma symptoms can include - Wheezing or noisy breathing, coughing, tight feeling in the chest, shortness of breath  Almost everyone with asthma has a quick-relief inhaler that works in 5- 10mins that they carry with them and long-term controller medicines control asthma and prevent future symptoms. People with frequent asthma symptoms take these 1 or 2 times each day.  You can stay away from things that cause your symptoms or make them worse. Doctors call these "triggers."  avoid them as much as possible. Some common triggers include - Dust, mold, animals, such as dogs and cats, pollen and plants, cigarette smoke, getting sick with a cold or flu (that's why it's important to get a flu shot), stress  Talk to your caregiver about an action plan for managing asthma attacks. This includes the use of a peak flow meter which measures the severity of the attack and medicines that can help stop the attack.   SEEK MEDICAL CARE IF:  You have wheezing, shortness of breath, or a cough even if taking medicine to prevent attacks.   You have thickening of sputum, sputum changes from clear or white to yellow, green, gray, or bloody.   You have any problems that may be related to the medicines you are taking (such as a rash, itching, swelling, or trouble breathing).  You are using a reliever medicine more than 2–3 times per week.  Your peak flow is still at 50–79% of personal best after following your action plan for 1 hour.  SEEK IMMEDIATE MEDICAL CARE IF:  You are short of breath even at rest,or with very little physical activity, chest pain, heart beating fast, bluish color to your lips or fingernails, fever, dizziness,   You seem to be getting worse and are unresponsive to treatment during an asthma attack.   Your peak flow is less than 50% of personal best.  Secondary Diagnosis:	Atrial fibrillation, chronic  Assessment and plan of treatment:	Atrial fibrillation is the most common heart rhythm problem.  The condition puts you at risk for has stroke and heart attack  It helps if you control your blood pressure, not drink more than 1-2 alcohol drinks per day, cut down on caffeine, getting treatment for over active thyroid gland, and get regular exercise  Call your doctor if you feel your heart racing or beating unusually, chest tightness or pain, lightheaded, faint, shortness of breath especially with exercise  It is important to take your heart medication as prescribed  You may be on anticoagulation which is very important to take as directed - you may need blood work to monitor drug levels  Secondary Diagnosis:	Chronic systolic congestive heart failure  Assessment and plan of treatment:	Weigh yourself daily.  If you gain 3lbs in 3 days, or 5lbs in a week call your Health Care Provider.  Do not eat or drink foods containing more than 2000mg of salt (sodium) in your diet every day.  Call your Health Care Provider if you have any swelling or increased swelling in your feet, ankles, and/or stomach.  Take all of your medication as directed.  If you become dizzy call your Health Care Provider.  Secondary Diagnosis:	Diabetes mellitus type 2 in obese  Assessment and plan of treatment:	HgA1C this admission.  Make sure you get your HgA1c checked every three months.  If you take oral diabetes medications, check your blood glucose two times a day.  If you take insulin, check your blood glucose before meals and at bedtime.  It's important not to skip any meals.  Keep a log of your blood glucose results and always take it with you to your doctor appointments.  Keep a list of your current medications including injectables and over the counter medications and bring this medication list with you to all your doctor appointments.  If you have not seen your ophthalmologist this year call for appointment.  Check your feet daily for redness, sores, or openings. Do not self treat. If no improvement in two days call your primary care physician for an appointment.  Low blood sugar (hypoglycemia) is a blood sugar below 70mg/dl. Check your blood sugar if you feel signs/symptoms of hypoglycemia. If your blood sugar is below 70 take 15 grams of carbohydrates (ex 4 oz of apple juice, 3-4 glucose tablets, or 4-6 oz of regular soda) wait 15 minutes and repeat blood sugar to make sure it comes up above 70.  If your blood sugar is above 70 and you are due for a meal, have a meal.  If you are not due for a meal have a snack.  This snack helps keeps your blood sugar at a safe range.  Secondary Diagnosis:	Hypertension  Assessment and plan of treatment:	Take medications for your blood pressure as recommended.  Eat a heart healthy diet that is low in saturated fats and salt, and includes whole grains, fruits, vegetables and lean protein   Exercise regularly (consult with your physician or cardiologist first); maintain a heart healthy weight.   If you smoke - quit (A resource to help you stop smoking is the [facility] – phone number [phone].). Continue to follow with your primary physician or cardiologist.   Seek medical help for dizziness, Lightheadedness, Blurry vision, Headache, Chest pain, Shortness of breath  Follow up with your medical doctor to establish long term blood pressure treatment goals.

## 2018-10-28 NOTE — DISCHARGE NOTE ADULT - PLAN OF CARE
resolution HOME CARE INSTRUCTIONS  For the next few days, avoid physical activities that bring on chest pain. Continue physical activities as directed.  Do not smoke.  Avoid drinking alcohol.   Only take over-the-counter or prescription medicine for pain, discomfort, or fever as directed by your caregiver.  Follow your caregiver's suggestions for further testing if your chest pain does not go away.  Keep any follow-up appointments you made. If you do not go to an appointment, you could develop lasting (chronic) problems with pain. If there is any problem keeping an appointment, you must call to reschedule.   SEEK MEDICAL CARE IF:  You think you are having problems from the medicine you are taking. Read your medicine instructions carefully.  Your chest pain does not go away, even after treatment.  You develop a rash with blisters on your chest.  SEEK IMMEDIATE MEDICAL CARE IF:  You have increased chest pain or pain that spreads to your arm, neck, jaw, back, or abdomen.   You develop shortness of breath, an increasing cough, or you are coughing up blood.  You have severe back or abdominal pain, feel nauseous, or vomit.  You develop severe weakness, fainting, or chills.  You have a fever.  THIS IS AN EMERGENCY. Do not wait to see if the pain will go away. Get medical help at once. Call your local emergency services (_____________________). Do not drive yourself to the hospital. Asthma attacks happen when the airways in the lungs become narrow and inflamed  Asthma symptoms can include - Wheezing or noisy breathing, coughing, tight feeling in the chest, shortness of breath  Almost everyone with asthma has a quick-relief inhaler that works in 5- 10mins that they carry with them and long-term controller medicines control asthma and prevent future symptoms. People with frequent asthma symptoms take these 1 or 2 times each day.  You can stay away from things that cause your symptoms or make them worse. Doctors call these "triggers."  avoid them as much as possible. Some common triggers include - Dust, mold, animals, such as dogs and cats, pollen and plants, cigarette smoke, getting sick with a cold or flu (that's why it's important to get a flu shot), stress  Talk to your caregiver about an action plan for managing asthma attacks. This includes the use of a peak flow meter which measures the severity of the attack and medicines that can help stop the attack.   SEEK MEDICAL CARE IF:  You have wheezing, shortness of breath, or a cough even if taking medicine to prevent attacks.   You have thickening of sputum, sputum changes from clear or white to yellow, green, gray, or bloody.   You have any problems that may be related to the medicines you are taking (such as a rash, itching, swelling, or trouble breathing).  You are using a reliever medicine more than 2–3 times per week.  Your peak flow is still at 50–79% of personal best after following your action plan for 1 hour.  SEEK IMMEDIATE MEDICAL CARE IF:  You are short of breath even at rest,or with very little physical activity, chest pain, heart beating fast, bluish color to your lips or fingernails, fever, dizziness,   You seem to be getting worse and are unresponsive to treatment during an asthma attack.   Your peak flow is less than 50% of personal best. Atrial fibrillation is the most common heart rhythm problem.  The condition puts you at risk for has stroke and heart attack  It helps if you control your blood pressure, not drink more than 1-2 alcohol drinks per day, cut down on caffeine, getting treatment for over active thyroid gland, and get regular exercise  Call your doctor if you feel your heart racing or beating unusually, chest tightness or pain, lightheaded, faint, shortness of breath especially with exercise  It is important to take your heart medication as prescribed  You may be on anticoagulation which is very important to take as directed - you may need blood work to monitor drug levels Weigh yourself daily.  If you gain 3lbs in 3 days, or 5lbs in a week call your Health Care Provider.  Do not eat or drink foods containing more than 2000mg of salt (sodium) in your diet every day.  Call your Health Care Provider if you have any swelling or increased swelling in your feet, ankles, and/or stomach.  Take all of your medication as directed.  If you become dizzy call your Health Care Provider. HgA1C this admission.  Make sure you get your HgA1c checked every three months.  If you take oral diabetes medications, check your blood glucose two times a day.  If you take insulin, check your blood glucose before meals and at bedtime.  It's important not to skip any meals.  Keep a log of your blood glucose results and always take it with you to your doctor appointments.  Keep a list of your current medications including injectables and over the counter medications and bring this medication list with you to all your doctor appointments.  If you have not seen your ophthalmologist this year call for appointment.  Check your feet daily for redness, sores, or openings. Do not self treat. If no improvement in two days call your primary care physician for an appointment.  Low blood sugar (hypoglycemia) is a blood sugar below 70mg/dl. Check your blood sugar if you feel signs/symptoms of hypoglycemia. If your blood sugar is below 70 take 15 grams of carbohydrates (ex 4 oz of apple juice, 3-4 glucose tablets, or 4-6 oz of regular soda) wait 15 minutes and repeat blood sugar to make sure it comes up above 70.  If your blood sugar is above 70 and you are due for a meal, have a meal.  If you are not due for a meal have a snack.  This snack helps keeps your blood sugar at a safe range. Take medications for your blood pressure as recommended.  Eat a heart healthy diet that is low in saturated fats and salt, and includes whole grains, fruits, vegetables and lean protein   Exercise regularly (consult with your physician or cardiologist first); maintain a heart healthy weight.   If you smoke - quit (A resource to help you stop smoking is the [facility] – phone number [phone].). Continue to follow with your primary physician or cardiologist.   Seek medical help for dizziness, Lightheadedness, Blurry vision, Headache, Chest pain, Shortness of breath  Follow up with your medical doctor to establish long term blood pressure treatment goals.

## 2018-10-28 NOTE — CONSULT NOTE ADULT - PROBLEM SELECTOR RECOMMENDATION 2
Start lasix 40mg po bid, low sodium intake, daily weights. Continue spironolactone 25mg po daily, metoprolol xl 25mg po daily, enalapril 10mg po bid. Keep Mg>4.0, K>2.0.

## 2018-10-28 NOTE — DISCHARGE NOTE ADULT - HOSPITAL COURSE
Patient is a 73 year old  female with systolic congestive heart failure with AICD, asthma (intubated once in the past; here one month ago with asthma exacerbation in the setting of rhinovirus), atrial fibrillation on eliquis, and DM2 presents to the ED complaining that she has been have left sided chest pressure and left arm pressure since the day before. Chest pain. Atypical chest pressure and left arm pain relieved with heating pad. Currently asymptomatic. No associated cardiac symptoms. Cardiac cath 3/8/18 normal coronaries. Likely noncardiac in nature. Continue ASA 81mg po daily, atorvastatin 20mg po daily. Heart failure with reduced ejection fraction, NYHA class II. Start lasix 40mg po bid, low sodium intake, daily weights. Continue spironolactone 25mg po daily, metoprolol xl 25mg po daily, enalapril 10mg po bid. Keep Mg>4.0, K>2.0. Atrial flutter.  Continue apixaban 5mg po bid for prevention of thromboembolic phenomena. Hypertension. Currently well controlled on medications.   Discharge with outpatient follow-up Patient is a 73 year old  female with systolic congestive heart failure with AICD, asthma (intubated once in the past; here one month ago with asthma exacerbation in the setting of rhinovirus), atrial fibrillation on eliquis, and DM2 presents to the ED complaining Atypical chest pressure and left arm pain relieved with heating pad who was found to have wheezing. Patient did not appear to have an acute CHF exacerbation but more so seemed to have an asthma exacerabtion for which she was treated with Nebs and steroids and her wheezing resolved the next morning. It was recommended to the patient that she continue 4 more days of prednisone after discharge. Patient was evaluated by house cardiology who agreed that the chest pain was not likely Cardiac in Nature.   Discharge with outpatient follow-up

## 2018-10-28 NOTE — PROGRESS NOTE ADULT - ATTENDING COMMENTS
patient stable for discharge home today with 4 more days of prednisone.   no changes in cardiac medications   Discharge time spent: 34 min

## 2018-10-28 NOTE — DISCHARGE NOTE ADULT - ADDITIONAL INSTRUCTIONS
Follow-up with your Primary Care Doctor within one week  Follow-up with your Cardiologist within one week   Follow-up with your Pulmonologist within one week

## 2018-10-28 NOTE — PROGRESS NOTE ADULT - SUBJECTIVE AND OBJECTIVE BOX
Patient is a 73y old  Female who presents with a chief complaint of wheezing (28 Oct 2018 11:39)      SUBJECTIVE / OVERNIGHT EVENTS:    No acute overnight events. Pt admitted overnight for an asthma exacerbation. Pt sitting comfortably in her chair. Pt states that wheezing and chest pain have resolved.  tele: Vpaced,  80 - 100,  10sec of Aflutter, PVCs  ROS: ( - ) Fever, ( - )Chills,  ( - )Nausea/Vomiting, ( - ) Cough, ( - )Shortness of breath, ( - )Chest Pain    MEDICATIONS  (STANDING):  ALBUTerol/ipratropium for Nebulization 3 milliLiter(s) Nebulizer every 6 hours  ALBUTerol/ipratropium for Nebulization. 3 milliLiter(s) Nebulizer once  apixaban 5 milliGRAM(s) Oral every 12 hours  aspirin enteric coated 81 milliGRAM(s) Oral daily  atorvastatin 20 milliGRAM(s) Oral at bedtime  enalapril 10 milliGRAM(s) Oral two times a day  furosemide    Tablet 40 milliGRAM(s) Oral two times a day  insulin glargine Injectable (LANTUS) 12 Unit(s) SubCutaneous at bedtime  insulin lispro (HumaLOG) corrective regimen sliding scale   SubCutaneous three times a day before meals  insulin lispro Injectable (HumaLOG) 2 Unit(s) SubCutaneous before breakfast  insulin lispro Injectable (HumaLOG) 2 Unit(s) SubCutaneous before lunch  insulin lispro Injectable (HumaLOG) 2 Unit(s) SubCutaneous before dinner  magnesium oxide 400 milliGRAM(s) Oral three times a day with meals  montelukast 10 milliGRAM(s) Oral daily  pantoprazole    Tablet 40 milliGRAM(s) Oral before breakfast  potassium chloride   Solution 6.6667 milliEquivalent(s) Oral daily  predniSONE   Tablet 40 milliGRAM(s) Oral daily  spironolactone 25 milliGRAM(s) Oral daily  tiotropium 18 MICROgram(s) Capsule 1 Capsule(s) Inhalation daily    MEDICATIONS  (PRN):  ALBUTerol/ipratropium for Nebulization 3 milliLiter(s) Nebulizer every 6 hours PRN Shortness of Breath and/or Wheezing      T(C): 36.7 (10-28 @ 11:48), Max: 36.7 (10-27 @ 17:05)   HR: 85   BP: 129/80   RR: 18   SpO2: 95%    PHYSICAL EXAM:  GENERAL: NAD, well-developed  CHEST/LUNG: Clear to auscultation bilaterally; No wheeze  HEART: Regular rate and rhythm; No murmurs, rubs, or gallops  ABDOMEN: Soft, Nontender, Nondistended; Bowel sounds present  EXTREMITIES:  2+ Peripheral Pulses, No clubbing, cyanosis, or edema  PSYCH: AAOx3  NEUROLOGY: non-focal    LABS:                        10.2   7.72  )-----------( 177      ( 28 Oct 2018 08:29 )             31.5      10-28    137  |  100  |  23  ----------------------------<  131<H>  4.1   |  24  |  1.02    Ca    9.2      28 Oct 2018 06:00  Phos  4.0     10-27  Mg     1.7     10-27    TPro  7.1  /  Alb  3.8  /  TBili  0.3  /  DBili  x   /  AST  16  /  ALT  13  /  AlkPhos  84  10-27       CAPILLARY BLOOD GLUCOSE      POCT Blood Glucose.: 265 mg/dL (28 Oct 2018 11:51)  POCT Blood Glucose.: 145 mg/dL (28 Oct 2018 08:00)  POCT Blood Glucose.: 133 mg/dL (27 Oct 2018 23:05)  POCT Blood Glucose.: 161 mg/dL (27 Oct 2018 21:51)  POCT Blood Glucose.: 145 mg/dL (27 Oct 2018 18:16)      RADIOLOGY & ADDITIONAL TESTS:    Imaging Personally Reviewed:  Consultant(s) Notes Reviewed:    Care Discussed with Consultants/Other Providers:

## 2018-10-28 NOTE — CONSULT NOTE ADULT - ASSESSMENT
Patient is a 73 year old  female with systolic congestive heart failure with AICD, asthma (intubated once in the past; here one month ago with asthma exacerbation in the setting of rhinovirus), atrial fibrillation on eliquis, and DM2 presents to the ED complaining that she has been have left sided chest pressure and left arm pressure since the day before.

## 2018-10-28 NOTE — PROVIDER CONTACT NOTE (OTHER) - ACTION/TREATMENT ORDERED:
Duoneb x 1 dose prn given, oxygen 3 L prn, avoid allergen and Sani-cloth use, move to private room when available, nursing supervisor made aware

## 2018-10-28 NOTE — DISCHARGE NOTE ADULT - SECONDARY DIAGNOSIS.
Asthma exacerbation, non-allergic, mild persistent Atrial fibrillation, chronic Chronic systolic congestive heart failure Diabetes mellitus type 2 in obese Hypertension

## 2018-10-28 NOTE — DISCHARGE NOTE ADULT - PROVIDER TOKENS
FREE:[LAST:[Adal],FIRST:[Martin],PHONE:[(   )    -],FAX:[(   )    -],ADDRESS:[Patient's Primary Care Provider]],TOKEN:'[id number]'

## 2018-10-28 NOTE — PROGRESS NOTE ADULT - PROBLEM SELECTOR PLAN 2
- chest pain is likely more so costochondritis (resolves with heating pad, reproducible with palpation)  - prn tylenol for pain control  Description of chest pain is most fitted for a musculoskeletal picture. No acute ekg changes. Cardiology eval appreciated and they are not concerned for cardiac cause of this chest pain as well.

## 2018-10-28 NOTE — PROGRESS NOTE ADULT - PROBLEM SELECTOR PLAN 1
- wheezing resolved with Nebs and prednisone  - shortness of breath unlikely 2' ACS. Description of chest pain is most fitted for a musculoskeletal picture. No acute ekg changes. Cardiology eval appreciated and they are not concerned for cardiac cause of this chest pain as well.   - shortness of breath unlikely related to congestive heart failure. Patient euvolemic. pro-bnp WNL.  - shortness of breath unlikely infectious in nature. Sputum production not more than baseline, no fevers, clear cxr. No signs of infection on labs. Hold off on any antibiotics.

## 2018-10-28 NOTE — CONSULT NOTE ADULT - ATTENDING COMMENTS
Thank you. My team will follow.    Luigi Clay M.D, F.A.C.C  [facility]   [phone]

## 2018-10-28 NOTE — CONSULT NOTE ADULT - SUBJECTIVE AND OBJECTIVE BOX
CARDIOLOGY CONSULTATION NOTE                                                                                             Consult requested by:  Dr. Rodarte    Reason for Consultation: chest pressure    History obtained by: Patient and medical record     obtained: No    Chief complaint:    Patient is a 73y old  Female who presents with a chief complaint of chest pressure.     HPI:  Patient is a 73 year old  female with systolic congestive heart failure with AICD, asthma (intubated once in the past; here one month ago with asthma exacerbation in the setting of rhinovirus), atrial fibrillation on eliquis, and DM2 presents to the ED complaining that she has been have left sided chest pressure and left arm pressure since the day before. States that it is intermittent in nature, relieved with a heating pad, no change with position and no trauma. No c/o nausea, vomiting, dizziness, diaphoresis, palpitations, LOC, PND. Sleeps through the night on 1-2 pillows. States that she has been having lower extremity oedema for past few days, non compliant with her diuretics and diet. Admits to having eaten [employer] and salty food. No fever or chills.       REVIEW OF SYMPTOMS: Cardiovascular:  See HPI. + chest pain,  No dyspnea,  No syncope,  No palpitations, No dizziness, No Orthopnea,      No Paroxsymal nocturnal dyspnea;  Respiratory:  No Dyspnea, No cough,     Genitourinary:  No dysuria, no hematuria; Gastrointestinal:  No nausea, no vomiting. No diarrhea.  No abdominal pain. No dark color stool, no melena ; Neurological: No headache, no dizziness, no slurred speech;  Psychiatric: No agitation, no anxiety.  ALL OTHER REVIEW OF SYSTEMS ARE NEGATIVE.    ALLERGIES: Allergies    Coreg (Other)  digoxin (Other; Short breath (Mild to Mod))  penicillins (Hives)    Intolerances    metoprolol (Other)  Solu-Medrol (Other (Mild to Mod))        CURRENT MEDICATIONS:  enalapril 10 milliGRAM(s) Oral two times a day  furosemide    Tablet 40 milliGRAM(s) Oral two times a day  spironolactone 25 milliGRAM(s) Oral daily    ALBUTerol/ipratropium for Nebulization  ALBUTerol/ipratropium for Nebulization.  montelukast  tiotropium 18 MICROgram(s) Capsule   pantoprazole    Tablet  apixaban  aspirin enteric coated  atorvastatin  insulin glargine Injectable (LANTUS)  insulin lispro (HumaLOG) corrective regimen sliding scale  insulin lispro Injectable (HumaLOG)  insulin lispro Injectable (HumaLOG)  insulin lispro Injectable (HumaLOG)  magnesium oxide  potassium chloride   Solution  predniSONE   Tablet      HOME MEDICATIONS:    PAST MEDICAL HISTORY  Vertigo  Atrial flutter  Diverticulitis  Cardiomyopathy  Kidney stone  COPD (chronic obstructive pulmonary disease)  Cardiac Pacemaker  HTN - Hypertension  Gout  Diabetes  Congestive Heart Failure  Asthma      PAST SURGICAL HISTORY  S/P cholecystectomy  AICD (Automatic Cardioverter/Defibrillator) Present  S/P Cholecystectomy      FAMILY HISTORY:  Family history of brain tumor (Father)      SOCIAL HISTORY:  Denies smoking/alcohol/drugs      Vital Signs Last 24 Hrs  T(C): 36.5 (27 Oct 2018 20:29), Max: 36.7 (27 Oct 2018 17:05)  T(F): 97.7 (27 Oct 2018 20:29), Max: 98 (27 Oct 2018 17:05)  HR: 97 (27 Oct 2018 20:29) (87 - 97)  BP: 113/71 (27 Oct 2018 20:29) (98/61 - 113/71)  BP(mean): --  RR: 18 (27 Oct 2018 20:29) (18 - 20)  SpO2: 97% (27 Oct 2018 20:29) (97% - 97%)      PHYSICAL EXAM:  Constitutional: Comfortable . No acute distress.   HEENT: Atraumatic and normcephalic , neck is supple . no JVD. No carotid bruit. PEERL   CNS: A&Ox3. No focal deficits. EOMI.   Lymph Nodes: Cervical : Not palpable.  Respiratory: CTAB  Cardiovascular: S1S2 RRR. No murmur/rubs or gallop.  Gastrointestinal: Soft non-tender and non distended . +Bowel sounds.   Extremities: mild lower extremity edema.   Psychiatric: Calm . no agitation.  Skin: No skin rash/ulcers visualized to face, hands or feet.    Intake and output:     LABS:                        10.8   9.1   )-----------( 172      ( 27 Oct 2018 14:58 )             32.2     10-27    140  |  103  |  25<H>  ----------------------------<  143<H>  4.6   |  24  |  1.04    Ca    9.7      27 Oct 2018 14:58  Phos  4.0     10-27  Mg     1.7     10-27    TPro  7.1  /  Alb  3.8  /  TBili  0.3  /  DBili  x   /  AST  16  /  ALT  13  /  AlkPhos  84  10-27    CARDIAC MARKERS ( 27 Oct 2018 14:58 )  x     / x     / 224 U/L / x     / 4.3 ng/mL    ;p-BNP=Serum Pro-Brain Natriuretic Peptide: 48 pg/mL (10-27 @ 14:58)          INTERPRETATION OF TELEMETRY: Reviewed by me.   ECG: Reviewed by me. A sensed, V paced    RADIOLOGY & ADDITIONAL STUDIES:    X-ray:  reviewed by me. no vascular congestion    < from: TTE with Doppler (w/Cont) (03.09.18 @ 15:47) >  Conclusions:  1. Endocardial visualization enhanced with intravenous  injection of echocontrast (Definity).  Despite the use of  echo contrast the visualization endocardium was still  suboptimal. At least, Moderate-severe segmental left  ventricular systolic dysfunction. Unable to calculate EF  accurately The  apical cap are akinetic.  *** Compared with echocardiogram of 4/24/2017, difficult to  compare- LV function similar.    < end of copied text >  < from: Cardiac Cath Lab - Adult (03.08.18 @ 12:45) >  The coronary circulation is right dominant.  LM:   --  LM: Normal.  LAD:   --  LAD: Normal.  CX:   --  Circumflex: Normal.  RCA:   --  RCA: Normal.  COMPLICATIONS: There were no complications.    < end of copied text >

## 2018-10-28 NOTE — DISCHARGE NOTE ADULT - CARE PROVIDER_API CALL
Martin Galeas  Patient's Primary Care Provider  Phone: (   )    -  Fax: (   )    -    Balaji Hobson (MD; MPH), [facility]; Cardiology  [address] [place] [address]  Phone: [phone]  Fax: [phone]

## 2018-11-08 ENCOUNTER — OTHER (OUTPATIENT)
Age: 73
End: 2018-11-08

## 2018-11-11 PROCEDURE — 84484 ASSAY OF TROPONIN QUANT: CPT

## 2018-11-11 PROCEDURE — 82947 ASSAY GLUCOSE BLOOD QUANT: CPT

## 2018-11-11 PROCEDURE — 83735 ASSAY OF MAGNESIUM: CPT

## 2018-11-11 PROCEDURE — 36415 COLL VENOUS BLD VENIPUNCTURE: CPT

## 2018-11-11 PROCEDURE — 82962 GLUCOSE BLOOD TEST: CPT

## 2018-11-11 PROCEDURE — 80048 BASIC METABOLIC PNL TOTAL CA: CPT

## 2018-11-11 PROCEDURE — 87633 RESP VIRUS 12-25 TARGETS: CPT

## 2018-11-11 PROCEDURE — 87486 CHLMYD PNEUM DNA AMP PROBE: CPT

## 2018-11-11 PROCEDURE — 82435 ASSAY OF BLOOD CHLORIDE: CPT

## 2018-11-11 PROCEDURE — 85014 HEMATOCRIT: CPT

## 2018-11-11 PROCEDURE — 82550 ASSAY OF CK (CPK): CPT

## 2018-11-11 PROCEDURE — 71045 X-RAY EXAM CHEST 1 VIEW: CPT

## 2018-11-11 PROCEDURE — 83880 ASSAY OF NATRIURETIC PEPTIDE: CPT

## 2018-11-11 PROCEDURE — 84132 ASSAY OF SERUM POTASSIUM: CPT

## 2018-11-11 PROCEDURE — 93005 ELECTROCARDIOGRAM TRACING: CPT

## 2018-11-11 PROCEDURE — 82803 BLOOD GASES ANY COMBINATION: CPT

## 2018-11-11 PROCEDURE — 87798 DETECT AGENT NOS DNA AMP: CPT

## 2018-11-11 PROCEDURE — 99285 EMERGENCY DEPT VISIT HI MDM: CPT | Mod: 25

## 2018-11-11 PROCEDURE — G0378: CPT

## 2018-11-11 PROCEDURE — 85027 COMPLETE CBC AUTOMATED: CPT

## 2018-11-11 PROCEDURE — 82330 ASSAY OF CALCIUM: CPT

## 2018-11-11 PROCEDURE — 94640 AIRWAY INHALATION TREATMENT: CPT

## 2018-11-11 PROCEDURE — 82553 CREATINE MB FRACTION: CPT

## 2018-11-11 PROCEDURE — 83605 ASSAY OF LACTIC ACID: CPT

## 2018-11-11 PROCEDURE — 84295 ASSAY OF SERUM SODIUM: CPT

## 2018-11-11 PROCEDURE — 84100 ASSAY OF PHOSPHORUS: CPT

## 2018-11-11 PROCEDURE — 87581 M.PNEUMON DNA AMP PROBE: CPT

## 2018-11-11 PROCEDURE — 80053 COMPREHEN METABOLIC PANEL: CPT

## 2018-12-03 ENCOUNTER — APPOINTMENT (OUTPATIENT)
Dept: ELECTROPHYSIOLOGY | Facility: CLINIC | Age: 73
End: 2018-12-03

## 2018-12-05 ENCOUNTER — APPOINTMENT (OUTPATIENT)
Dept: ELECTROPHYSIOLOGY | Facility: CLINIC | Age: 73
End: 2018-12-05

## 2018-12-21 ENCOUNTER — APPOINTMENT (OUTPATIENT)
Dept: CARDIOLOGY | Facility: CLINIC | Age: 73
End: 2018-12-21

## 2019-02-20 NOTE — PATIENT PROFILE ADULT. - NS PRO CONTRA FLU 1
On 2/14 SW received email that patient would be seen the next day by [facility]. Patient had refused visit. Patient was in hospice, then decided she wanted HH. Palliative care was also ordered for patient as she has issues with pain.   [facility] to explain the differ
yes

## 2019-03-29 ENCOUNTER — TRANSCRIPTION ENCOUNTER (OUTPATIENT)
Age: 74
End: 2019-03-29

## 2019-04-15 ENCOUNTER — APPOINTMENT (OUTPATIENT)
Dept: ELECTROPHYSIOLOGY | Facility: CLINIC | Age: 74
End: 2019-04-15
Payer: MEDICARE

## 2019-04-15 VITALS
BODY MASS INDEX: 33.86 KG/M2 | DIASTOLIC BLOOD PRESSURE: 63 MMHG | HEART RATE: 89 BPM | SYSTOLIC BLOOD PRESSURE: 97 MMHG | WEIGHT: 184 LBS | HEIGHT: 62 IN | OXYGEN SATURATION: 97 %

## 2019-04-15 PROCEDURE — 93284 PRGRMG EVAL IMPLANTABLE DFB: CPT

## 2019-05-03 ENCOUNTER — APPOINTMENT (OUTPATIENT)
Dept: CARDIOLOGY | Facility: CLINIC | Age: 74
End: 2019-05-03

## 2019-05-31 ENCOUNTER — INPATIENT (INPATIENT)
Facility: HOSPITAL | Age: 74
LOS: 2 days | Discharge: HOME | DRG: 191 | End: 2019-06-03
Attending: INTERNAL MEDICINE | Admitting: STUDENT IN AN ORGANIZED HEALTH CARE EDUCATION/TRAINING PROGRAM
Payer: MEDICARE

## 2019-05-31 VITALS
HEIGHT: 62 IN | DIASTOLIC BLOOD PRESSURE: 69 MMHG | HEART RATE: 90 BPM | SYSTOLIC BLOOD PRESSURE: 117 MMHG | RESPIRATION RATE: 22 BRPM | WEIGHT: 184.97 LBS | TEMPERATURE: 98 F | OXYGEN SATURATION: 98 %

## 2019-05-31 DIAGNOSIS — I10 ESSENTIAL (PRIMARY) HYPERTENSION: ICD-10-CM

## 2019-05-31 DIAGNOSIS — J45.901 UNSPECIFIED ASTHMA WITH (ACUTE) EXACERBATION: ICD-10-CM

## 2019-05-31 DIAGNOSIS — I50.20 UNSPECIFIED SYSTOLIC (CONGESTIVE) HEART FAILURE: ICD-10-CM

## 2019-05-31 DIAGNOSIS — I48.92 UNSPECIFIED ATRIAL FLUTTER: ICD-10-CM

## 2019-05-31 DIAGNOSIS — J44.1 CHRONIC OBSTRUCTIVE PULMONARY DISEASE WITH (ACUTE) EXACERBATION: ICD-10-CM

## 2019-05-31 DIAGNOSIS — Z29.9 ENCOUNTER FOR PROPHYLACTIC MEASURES, UNSPECIFIED: ICD-10-CM

## 2019-05-31 DIAGNOSIS — E11.9 TYPE 2 DIABETES MELLITUS WITHOUT COMPLICATIONS: ICD-10-CM

## 2019-05-31 LAB
ALBUMIN SERPL ELPH-MCNC: 4 G/DL — SIGNIFICANT CHANGE UP (ref 3.3–5)
ALP SERPL-CCNC: 81 U/L — SIGNIFICANT CHANGE UP (ref 40–120)
ALT FLD-CCNC: 12 U/L — SIGNIFICANT CHANGE UP (ref 10–45)
ANION GAP SERPL CALC-SCNC: 12 MMOL/L — SIGNIFICANT CHANGE UP (ref 5–17)
APTT BLD: 33.6 SEC — SIGNIFICANT CHANGE UP (ref 27.5–36.3)
AST SERPL-CCNC: 20 U/L — SIGNIFICANT CHANGE UP (ref 10–40)
BASOPHILS # BLD AUTO: 0 K/UL — SIGNIFICANT CHANGE UP (ref 0–0.2)
BASOPHILS NFR BLD AUTO: 0.2 % — SIGNIFICANT CHANGE UP (ref 0–2)
BILIRUB SERPL-MCNC: 0.3 MG/DL — SIGNIFICANT CHANGE UP (ref 0.2–1.2)
BUN SERPL-MCNC: 31 MG/DL — HIGH (ref 7–23)
CALCIUM SERPL-MCNC: 9.2 MG/DL — SIGNIFICANT CHANGE UP (ref 8.4–10.5)
CHLORIDE SERPL-SCNC: 100 MMOL/L — SIGNIFICANT CHANGE UP (ref 96–108)
CO2 SERPL-SCNC: 26 MMOL/L — SIGNIFICANT CHANGE UP (ref 22–31)
CREAT SERPL-MCNC: 1.28 MG/DL — SIGNIFICANT CHANGE UP (ref 0.5–1.3)
EOSINOPHIL # BLD AUTO: 0.2 K/UL — SIGNIFICANT CHANGE UP (ref 0–0.5)
EOSINOPHIL NFR BLD AUTO: 2.8 % — SIGNIFICANT CHANGE UP (ref 0–6)
GLUCOSE BLDC GLUCOMTR-MCNC: 271 MG/DL — HIGH (ref 70–99)
GLUCOSE BLDC GLUCOMTR-MCNC: 311 MG/DL — HIGH (ref 70–99)
GLUCOSE SERPL-MCNC: 82 MG/DL — SIGNIFICANT CHANGE UP (ref 70–99)
HCT VFR BLD CALC: 33.7 % — LOW (ref 34.5–45)
HGB BLD-MCNC: 10.6 G/DL — LOW (ref 11.5–15.5)
INR BLD: 1.42 RATIO — HIGH (ref 0.88–1.16)
LYMPHOCYTES # BLD AUTO: 1.8 K/UL — SIGNIFICANT CHANGE UP (ref 1–3.3)
LYMPHOCYTES # BLD AUTO: 26.3 % — SIGNIFICANT CHANGE UP (ref 13–44)
MCHC RBC-ENTMCNC: 30.1 PG — SIGNIFICANT CHANGE UP (ref 27–34)
MCHC RBC-ENTMCNC: 31.4 GM/DL — LOW (ref 32–36)
MCV RBC AUTO: 95.8 FL — SIGNIFICANT CHANGE UP (ref 80–100)
MONOCYTES # BLD AUTO: 0.5 K/UL — SIGNIFICANT CHANGE UP (ref 0–0.9)
MONOCYTES NFR BLD AUTO: 7.9 % — SIGNIFICANT CHANGE UP (ref 2–14)
NEUTROPHILS # BLD AUTO: 4.2 K/UL — SIGNIFICANT CHANGE UP (ref 1.8–7.4)
NEUTROPHILS NFR BLD AUTO: 62.8 % — SIGNIFICANT CHANGE UP (ref 43–77)
NT-PROBNP SERPL-SCNC: 25 PG/ML — SIGNIFICANT CHANGE UP (ref 0–300)
PLATELET # BLD AUTO: 182 K/UL — SIGNIFICANT CHANGE UP (ref 150–400)
POTASSIUM SERPL-MCNC: 4.2 MMOL/L — SIGNIFICANT CHANGE UP (ref 3.5–5.3)
POTASSIUM SERPL-SCNC: 4.2 MMOL/L — SIGNIFICANT CHANGE UP (ref 3.5–5.3)
PROT SERPL-MCNC: 7 G/DL — SIGNIFICANT CHANGE UP (ref 6–8.3)
PROTHROM AB SERPL-ACNC: 16.4 SEC — HIGH (ref 10–12.9)
RAPID RVP RESULT: SIGNIFICANT CHANGE UP
RBC # BLD: 3.52 M/UL — LOW (ref 3.8–5.2)
RBC # FLD: 13.5 % — SIGNIFICANT CHANGE UP (ref 10.3–14.5)
SODIUM SERPL-SCNC: 138 MMOL/L — SIGNIFICANT CHANGE UP (ref 135–145)
TROPONIN T, HIGH SENSITIVITY RESULT: 46 NG/L — SIGNIFICANT CHANGE UP (ref 0–51)
TROPONIN T, HIGH SENSITIVITY RESULT: 50 NG/L — SIGNIFICANT CHANGE UP (ref 0–51)
WBC # BLD: 6.7 K/UL — SIGNIFICANT CHANGE UP (ref 3.8–10.5)
WBC # FLD AUTO: 6.7 K/UL — SIGNIFICANT CHANGE UP (ref 3.8–10.5)

## 2019-05-31 PROCEDURE — 99223 1ST HOSP IP/OBS HIGH 75: CPT | Mod: AI

## 2019-05-31 PROCEDURE — 93010 ELECTROCARDIOGRAM REPORT: CPT

## 2019-05-31 PROCEDURE — 71045 X-RAY EXAM CHEST 1 VIEW: CPT | Mod: 26

## 2019-05-31 PROCEDURE — 99285 EMERGENCY DEPT VISIT HI MDM: CPT | Mod: 25

## 2019-05-31 RX ORDER — GLUCAGON INJECTION, SOLUTION 0.5 MG/.1ML
1 INJECTION, SOLUTION SUBCUTANEOUS ONCE
Refills: 0 | Status: DISCONTINUED | OUTPATIENT
Start: 2019-05-31 | End: 2019-06-03

## 2019-05-31 RX ORDER — MAGNESIUM OXIDE 400 MG ORAL TABLET 241.3 MG
400 TABLET ORAL
Refills: 0 | Status: DISCONTINUED | OUTPATIENT
Start: 2019-05-31 | End: 2019-06-03

## 2019-05-31 RX ORDER — IPRATROPIUM/ALBUTEROL SULFATE 18-103MCG
3 AEROSOL WITH ADAPTER (GRAM) INHALATION EVERY 6 HOURS
Refills: 0 | Status: DISCONTINUED | OUTPATIENT
Start: 2019-05-31 | End: 2019-06-03

## 2019-05-31 RX ORDER — MONTELUKAST 4 MG/1
10 TABLET, CHEWABLE ORAL DAILY
Refills: 0 | Status: DISCONTINUED | OUTPATIENT
Start: 2019-05-31 | End: 2019-06-03

## 2019-05-31 RX ORDER — IPRATROPIUM/ALBUTEROL SULFATE 18-103MCG
3 AEROSOL WITH ADAPTER (GRAM) INHALATION ONCE
Refills: 0 | Status: COMPLETED | OUTPATIENT
Start: 2019-05-31 | End: 2019-05-31

## 2019-05-31 RX ORDER — DEXTROSE 50 % IN WATER 50 %
15 SYRINGE (ML) INTRAVENOUS ONCE
Refills: 0 | Status: DISCONTINUED | OUTPATIENT
Start: 2019-05-31 | End: 2019-06-03

## 2019-05-31 RX ORDER — DEXTROSE 50 % IN WATER 50 %
12.5 SYRINGE (ML) INTRAVENOUS ONCE
Refills: 0 | Status: DISCONTINUED | OUTPATIENT
Start: 2019-05-31 | End: 2019-06-03

## 2019-05-31 RX ORDER — FUROSEMIDE 40 MG
20 TABLET ORAL ONCE
Refills: 0 | Status: COMPLETED | OUTPATIENT
Start: 2019-05-31 | End: 2019-05-31

## 2019-05-31 RX ORDER — INSULIN GLARGINE 100 [IU]/ML
12 INJECTION, SOLUTION SUBCUTANEOUS AT BEDTIME
Refills: 0 | Status: DISCONTINUED | OUTPATIENT
Start: 2019-05-31 | End: 2019-06-03

## 2019-05-31 RX ORDER — PANTOPRAZOLE SODIUM 20 MG/1
40 TABLET, DELAYED RELEASE ORAL
Refills: 0 | Status: DISCONTINUED | OUTPATIENT
Start: 2019-05-31 | End: 2019-06-03

## 2019-05-31 RX ORDER — ACETAMINOPHEN 500 MG
650 TABLET ORAL ONCE
Refills: 0 | Status: COMPLETED | OUTPATIENT
Start: 2019-05-31 | End: 2019-05-31

## 2019-05-31 RX ORDER — LORATADINE 10 MG/1
10 TABLET ORAL DAILY
Refills: 0 | Status: DISCONTINUED | OUTPATIENT
Start: 2019-05-31 | End: 2019-06-03

## 2019-05-31 RX ORDER — FUROSEMIDE 40 MG
40 TABLET ORAL
Refills: 0 | Status: DISCONTINUED | OUTPATIENT
Start: 2019-05-31 | End: 2019-06-03

## 2019-05-31 RX ORDER — INSULIN LISPRO 100/ML
VIAL (ML) SUBCUTANEOUS
Refills: 0 | Status: DISCONTINUED | OUTPATIENT
Start: 2019-05-31 | End: 2019-06-03

## 2019-05-31 RX ORDER — SPIRONOLACTONE 25 MG/1
25 TABLET, FILM COATED ORAL DAILY
Refills: 0 | Status: DISCONTINUED | OUTPATIENT
Start: 2019-05-31 | End: 2019-06-03

## 2019-05-31 RX ORDER — ASPIRIN/CALCIUM CARB/MAGNESIUM 324 MG
81 TABLET ORAL DAILY
Refills: 0 | Status: DISCONTINUED | OUTPATIENT
Start: 2019-05-31 | End: 2019-06-03

## 2019-05-31 RX ORDER — APIXABAN 2.5 MG/1
5 TABLET, FILM COATED ORAL EVERY 12 HOURS
Refills: 0 | Status: DISCONTINUED | OUTPATIENT
Start: 2019-05-31 | End: 2019-06-03

## 2019-05-31 RX ORDER — SODIUM CHLORIDE 9 MG/ML
1000 INJECTION, SOLUTION INTRAVENOUS
Refills: 0 | Status: DISCONTINUED | OUTPATIENT
Start: 2019-05-31 | End: 2019-06-03

## 2019-05-31 RX ORDER — ATORVASTATIN CALCIUM 80 MG/1
20 TABLET, FILM COATED ORAL AT BEDTIME
Refills: 0 | Status: DISCONTINUED | OUTPATIENT
Start: 2019-05-31 | End: 2019-06-03

## 2019-05-31 RX ADMIN — Medication 50 MILLIGRAM(S): at 10:06

## 2019-05-31 RX ADMIN — Medication 3 MILLILITER(S): at 10:06

## 2019-05-31 RX ADMIN — Medication 3 MILLILITER(S): at 23:03

## 2019-05-31 RX ADMIN — Medication 100 MILLIGRAM(S): at 19:58

## 2019-05-31 RX ADMIN — ATORVASTATIN CALCIUM 20 MILLIGRAM(S): 80 TABLET, FILM COATED ORAL at 21:29

## 2019-05-31 RX ADMIN — LORATADINE 10 MILLIGRAM(S): 10 TABLET ORAL at 21:30

## 2019-05-31 RX ADMIN — Medication 10 MILLIGRAM(S): at 19:58

## 2019-05-31 RX ADMIN — SPIRONOLACTONE 25 MILLIGRAM(S): 25 TABLET, FILM COATED ORAL at 23:44

## 2019-05-31 RX ADMIN — Medication 81 MILLIGRAM(S): at 22:35

## 2019-05-31 RX ADMIN — Medication 20 MILLIGRAM(S): at 12:46

## 2019-05-31 RX ADMIN — Medication 650 MILLIGRAM(S): at 13:46

## 2019-05-31 RX ADMIN — Medication 8: at 19:50

## 2019-05-31 RX ADMIN — INSULIN GLARGINE 12 UNIT(S): 100 INJECTION, SOLUTION SUBCUTANEOUS at 23:44

## 2019-05-31 RX ADMIN — MONTELUKAST 10 MILLIGRAM(S): 4 TABLET, CHEWABLE ORAL at 21:28

## 2019-05-31 RX ADMIN — Medication 100 MILLIGRAM(S): at 13:47

## 2019-05-31 RX ADMIN — Medication 3 MILLILITER(S): at 12:45

## 2019-05-31 RX ADMIN — MAGNESIUM OXIDE 400 MG ORAL TABLET 400 MILLIGRAM(S): 241.3 TABLET ORAL at 21:28

## 2019-05-31 RX ADMIN — Medication 60 MILLIGRAM(S): at 19:57

## 2019-05-31 RX ADMIN — Medication 40 MILLIGRAM(S): at 19:56

## 2019-05-31 RX ADMIN — Medication 650 MILLIGRAM(S): at 16:09

## 2019-05-31 RX ADMIN — APIXABAN 5 MILLIGRAM(S): 2.5 TABLET, FILM COATED ORAL at 21:29

## 2019-05-31 NOTE — ED ADULT NURSE NOTE - NSIMPLEMENTINTERV_GEN_ALL_ED
Implemented All Fall Risk Interventions:  [place] to call system. Call bell, personal items and telephone within reach. Instruct patient to call for assistance. Room bathroom lighting operational. Non-slip footwear when patient is off stretcher. Physically safe environment: no spills, clutter or unnecessary equipment. Stretcher in lowest position, wheels locked, appropriate side rails in place. Provide visual cue, wrist band, yellow gown, etc. Monitor gait and stability. Monitor for mental status changes and reorient to person, place, and time. Review medications for side effects contributing to fall risk. Reinforce activity limits and safety measures with patient and family.

## 2019-05-31 NOTE — H&P ADULT - HISTORY OF PRESENT ILLNESS
75 yo F with HFrEF s/p aicd, asthma, HTN, HLD, atrial fibrillation on eliquis, presents with sob x 1 week. Patient was getting work down around the house when she started to experience some wheezing which she attributed to the dust. She used her inhaler and felt better. The next few days were rainy and cold and further made her sob and start to wheeze. No fevers, chills, sick contacts. She uses a nebulizer machine but it did not help. She overall just felt sick so she decided to come to the ED. At baseline she sleeps with two pillows and can walk up a flight of stairs without getting sob. She also feels a bit worse because she smelled the white and purple wipes and that triggers her asthma. she is compliant with all her medications and endorses her aicd was recently interrogated and there were no issues.

## 2019-05-31 NOTE — ED PROVIDER NOTE - PSH
AICD (Automatic Cardioverter/Defibrillator) Present  inserted in Aug, 2008. Due for battery change in 1 month. ( [employer]) . Inserted by Dr Duffy  S/P cholecystectomy

## 2019-05-31 NOTE — H&P ADULT - PROBLEM SELECTOR PLAN 1
likely due to trigger being dust  c/w dounebs  prednisone 60mg daily  saturating well on room air  c/w montelukast  c/w claritin

## 2019-05-31 NOTE — H&P ADULT - NSICDXPASTSURGICALHX_GEN_ALL_CORE_FT
PAST SURGICAL HISTORY:  AICD (Automatic Cardioverter/Defibrillator) Present inserted in Aug, 2008. Due for battery change in 1 month. ( [employer] . Inserted by Dr Duffy    S/P cholecystectomy

## 2019-05-31 NOTE — ED PROVIDER NOTE - OBJECTIVE STATEMENT
74F with hx of A-fib (on eliquis), CHF (on lasix), AICD (replaced 1 year ago), COPD, asthma, cardiomyopathy, HTN, DM presenting with progressive dyspnea, productive cough x 1 week, peripheral edema. Is doing renovations at home, and symptoms may be triggered by the dust, per patient. Dyspnea worse with exertion. Denies chest pain, recent travel, calf pain, recent trauma or surgery, palpitations. Patient has chronic peripheral edema (L>R).    PMD/Pulm: Kirill Daily

## 2019-05-31 NOTE — ED PROVIDER NOTE - NS ED ROS FT
GENERAL: no fever, chills  HEENT: no congestion, odynophagia, dysphagia  CARDIAC: no chest pain, palpitations, lightheadedness; + peripheral edema  PULM: + dyspnea, productive cough  GI: no abdominal pain, nausea, vomiting, diarrhea, constipation, melena, hematochezia  : no urinary dysuria, frequency, incontinence, hematuria  NEURO: no headache, motor weakness, sensory changes  MSK: no joint or muscle pain  SKIN: no rashes, hives  HEME: no active bleeding, bruising

## 2019-05-31 NOTE — ED PROVIDER NOTE - PHYSICAL EXAMINATION
GENERAL: no acute distress, non-toxic appearing  HEAD: normocephalic, atraumatic  HEENT: normal conjunctiva, oral mucosa moist, neck supple  CARDIAC: regular rate and rhythm, normal S1 and S2,  no appreciable murmurs  PULM: diffuse end-expiratory wheezing, soft crackles at bases bilaterally  GI: abdomen nondistended, soft, nontender, no guarding or rebound tenderness  NEURO: alert and oriented x 3, normal speech, PERRLA, EOMI, no focal motor or sensory deficits, nonantalgic gait  MSK: no visible deformities, calf tenderness/redness/swelling; 1+ pitting edema bilaterally (L>R, which is chronic for patient)  SKIN: no visible rashes, dry, well-perfused  PSYCH: appropriate mood and affect

## 2019-05-31 NOTE — ED ADULT NURSE NOTE - OBJECTIVE STATEMENT
74 year old female presents ambulatory to ED through waiting room from home with daughter complaining of shortness of breath & cough. History of asthma, CHF on lasix, afib aflutter s/p PPM on elaquis, HTN, vertigo, gout, 74 year old female presents ambulatory to ED through waiting room from home with daughter complaining of shortness of breath & cough. History of asthma, CHF on lasix, afib aflutter s/p PPM on elaquis, HTN, vertigo, gout. States she has been feeling unwell x 1 week, acutely worse since last night. +productive cough. Little relief with her home nebulizer. Worsening SILVA, even becoming shortness of breath with minor changes in position. States she is allergic to cleaning products and wipes - all wipes removed from room at patients request. Feels her breathing became worse due to work being done at her house. Denies headache, chest pain, abd pain, NVD, fevers, chills.

## 2019-05-31 NOTE — ED ADULT NURSE NOTE - PSH
AICD (Automatic Cardioverter/Defibrillator) Present  inserted in Aug, 2008. Due for battery change in 1 month. ( [employer]) . Inserted by Dr Duffy  S/P cholecystectomy

## 2019-05-31 NOTE — ED PROVIDER NOTE - CLINICAL SUMMARY MEDICAL DECISION MAKING FREE TEXT BOX
74F with hx of A-fib (on eliquis), CHF (on lasix), AICD (replaced 1 year ago), COPD, asthma, cardiomyopathy, HTN, DM presenting with progressive dyspnea, productive cough x 1 week, peripheral edema. Exam significant for diffuse end-expiratory wheezing, soft crackles at bases, bilateral peripheral edema. Low suspicion for ACS and PE. Ddx includes asthma/COPD exacerbation vs CHF exacerbation. Plan - basics, trop, bnp, ekg, cxr, duonebs, steroids. Will hold off on antibiotics as not febrile, and lasix until CXR results.

## 2019-05-31 NOTE — H&P ADULT - NSICDXPASTMEDICALHX_GEN_ALL_CORE_FT
PAST MEDICAL HISTORY:  Asthma     Atrial flutter     Cardiac Pacemaker     Cardiomyopathy     Congestive Heart Failure     Diabetes     Diverticulitis     Gout     HTN - Hypertension     Kidney stone     Vertigo

## 2019-05-31 NOTE — H&P ADULT - ASSESSMENT
73 yo F with HFrEF s/p aicd, asthma, HTN, HLD, atrial flutter on eliquis, presents with sob x 1 week due to asthma exacerbation

## 2019-05-31 NOTE — ED PROVIDER NOTE - ATTENDING CONTRIBUTION TO CARE
74F with hx of A-fib (on eliquis), CHF (on lasix), AICD (replaced 1 year ago), COPD, asthma, cardiomyopathy, HTN, DM presenting with progressive dyspnea, productive cough x 1 week with productive cough, no f/c, pt with diffuse wheezing noted, mild resp distress, vss, enbs, steroids with mild improvement to admit for copd excerbation, chf work up unremarkable and improved on lasix 40mgbid.

## 2019-05-31 NOTE — H&P ADULT - NSHPLABSRESULTS_GEN_ALL_CORE
CBC Full  -  ( 31 May 2019 10:29 )  WBC Count : 6.7 K/uL  RBC Count : 3.52 M/uL  Hemoglobin : 10.6 g/dL  Hematocrit : 33.7 %  Platelet Count - Automated : 182 K/uL  Mean Cell Volume : 95.8 fl  Mean Cell Hemoglobin : 30.1 pg  Mean Cell Hemoglobin Concentration : 31.4 gm/dL  Auto Neutrophil # : 4.2 K/uL  Auto Lymphocyte # : 1.8 K/uL  Auto Monocyte # : 0.5 K/uL  Auto Eosinophil # : 0.2 K/uL  Auto Basophil # : 0.0 K/uL  Auto Neutrophil % : 62.8 %  Auto Lymphocyte % : 26.3 %  Auto Monocyte % : 7.9 %  Auto Eosinophil % : 2.8 %  Auto Basophil % : 0.2 %    05-31    138  |  100  |  31<H>  ----------------------------<  82  4.2   |  26  |  1.28    Ca    9.2      31 May 2019 10:29    TPro  7.0  /  Alb  4.0  /  TBili  0.3  /  DBili  x   /  AST  20  /  ALT  12  /  AlkPhos  81  05-31      RVP neg    cxr no obvious infiltrate  ekg pending

## 2019-06-01 LAB
GLUCOSE BLDC GLUCOMTR-MCNC: 131 MG/DL — HIGH (ref 70–99)
GLUCOSE BLDC GLUCOMTR-MCNC: 213 MG/DL — HIGH (ref 70–99)
GLUCOSE BLDC GLUCOMTR-MCNC: 284 MG/DL — HIGH (ref 70–99)
GLUCOSE BLDC GLUCOMTR-MCNC: 295 MG/DL — HIGH (ref 70–99)
HBA1C BLD-MCNC: 7.2 % — HIGH (ref 4–5.6)

## 2019-06-01 PROCEDURE — 99233 SBSQ HOSP IP/OBS HIGH 50: CPT

## 2019-06-01 RX ORDER — ACETAMINOPHEN 500 MG
650 TABLET ORAL EVERY 6 HOURS
Refills: 0 | Status: DISCONTINUED | OUTPATIENT
Start: 2019-06-01 | End: 2019-06-03

## 2019-06-01 RX ADMIN — Medication 3 MILLILITER(S): at 23:22

## 2019-06-01 RX ADMIN — Medication 10 MILLIGRAM(S): at 17:02

## 2019-06-01 RX ADMIN — Medication 3 MILLILITER(S): at 12:35

## 2019-06-01 RX ADMIN — APIXABAN 5 MILLIGRAM(S): 2.5 TABLET, FILM COATED ORAL at 17:02

## 2019-06-01 RX ADMIN — Medication 81 MILLIGRAM(S): at 12:35

## 2019-06-01 RX ADMIN — PANTOPRAZOLE SODIUM 40 MILLIGRAM(S): 20 TABLET, DELAYED RELEASE ORAL at 06:36

## 2019-06-01 RX ADMIN — LORATADINE 10 MILLIGRAM(S): 10 TABLET ORAL at 12:35

## 2019-06-01 RX ADMIN — Medication 10 MILLIGRAM(S): at 06:35

## 2019-06-01 RX ADMIN — Medication 60 MILLIGRAM(S): at 06:36

## 2019-06-01 RX ADMIN — MAGNESIUM OXIDE 400 MG ORAL TABLET 400 MILLIGRAM(S): 241.3 TABLET ORAL at 12:34

## 2019-06-01 RX ADMIN — APIXABAN 5 MILLIGRAM(S): 2.5 TABLET, FILM COATED ORAL at 08:25

## 2019-06-01 RX ADMIN — Medication 3 MILLILITER(S): at 17:02

## 2019-06-01 RX ADMIN — Medication 650 MILLIGRAM(S): at 22:19

## 2019-06-01 RX ADMIN — SPIRONOLACTONE 25 MILLIGRAM(S): 25 TABLET, FILM COATED ORAL at 06:35

## 2019-06-01 RX ADMIN — MAGNESIUM OXIDE 400 MG ORAL TABLET 400 MILLIGRAM(S): 241.3 TABLET ORAL at 17:01

## 2019-06-01 RX ADMIN — Medication 100 MILLIGRAM(S): at 19:45

## 2019-06-01 RX ADMIN — Medication 40 MILLIGRAM(S): at 06:36

## 2019-06-01 RX ADMIN — INSULIN GLARGINE 12 UNIT(S): 100 INJECTION, SOLUTION SUBCUTANEOUS at 21:07

## 2019-06-01 RX ADMIN — MONTELUKAST 10 MILLIGRAM(S): 4 TABLET, CHEWABLE ORAL at 12:34

## 2019-06-01 RX ADMIN — Medication 4: at 08:23

## 2019-06-01 RX ADMIN — Medication 3 MILLILITER(S): at 06:37

## 2019-06-01 RX ADMIN — Medication 40 MILLIGRAM(S): at 17:03

## 2019-06-01 RX ADMIN — MAGNESIUM OXIDE 400 MG ORAL TABLET 400 MILLIGRAM(S): 241.3 TABLET ORAL at 08:25

## 2019-06-01 RX ADMIN — ATORVASTATIN CALCIUM 20 MILLIGRAM(S): 80 TABLET, FILM COATED ORAL at 21:07

## 2019-06-01 RX ADMIN — Medication 6: at 12:32

## 2019-06-01 RX ADMIN — Medication 650 MILLIGRAM(S): at 21:49

## 2019-06-02 LAB
GLUCOSE BLDC GLUCOMTR-MCNC: 128 MG/DL — HIGH (ref 70–99)
GLUCOSE BLDC GLUCOMTR-MCNC: 229 MG/DL — HIGH (ref 70–99)
GLUCOSE BLDC GLUCOMTR-MCNC: 289 MG/DL — HIGH (ref 70–99)
GLUCOSE BLDC GLUCOMTR-MCNC: 292 MG/DL — HIGH (ref 70–99)

## 2019-06-02 PROCEDURE — 99223 1ST HOSP IP/OBS HIGH 75: CPT

## 2019-06-02 PROCEDURE — 99233 SBSQ HOSP IP/OBS HIGH 50: CPT

## 2019-06-02 RX ORDER — DOCOSANOL 100 MG/G
1 CREAM TOPICAL DAILY
Refills: 0 | Status: DISCONTINUED | OUTPATIENT
Start: 2019-06-02 | End: 2019-06-03

## 2019-06-02 RX ORDER — INSULIN LISPRO 100/ML
3 VIAL (ML) SUBCUTANEOUS
Refills: 0 | Status: DISCONTINUED | OUTPATIENT
Start: 2019-06-02 | End: 2019-06-03

## 2019-06-02 RX ADMIN — Medication 40 MILLIGRAM(S): at 06:28

## 2019-06-02 RX ADMIN — Medication 10 MILLIGRAM(S): at 06:07

## 2019-06-02 RX ADMIN — Medication 6: at 17:06

## 2019-06-02 RX ADMIN — LORATADINE 10 MILLIGRAM(S): 10 TABLET ORAL at 12:23

## 2019-06-02 RX ADMIN — MAGNESIUM OXIDE 400 MG ORAL TABLET 400 MILLIGRAM(S): 241.3 TABLET ORAL at 08:29

## 2019-06-02 RX ADMIN — APIXABAN 5 MILLIGRAM(S): 2.5 TABLET, FILM COATED ORAL at 06:06

## 2019-06-02 RX ADMIN — Medication 81 MILLIGRAM(S): at 12:23

## 2019-06-02 RX ADMIN — INSULIN GLARGINE 12 UNIT(S): 100 INJECTION, SOLUTION SUBCUTANEOUS at 21:28

## 2019-06-02 RX ADMIN — Medication 6: at 12:21

## 2019-06-02 RX ADMIN — MAGNESIUM OXIDE 400 MG ORAL TABLET 400 MILLIGRAM(S): 241.3 TABLET ORAL at 12:23

## 2019-06-02 RX ADMIN — Medication 60 MILLIGRAM(S): at 06:08

## 2019-06-02 RX ADMIN — Medication 3 MILLILITER(S): at 17:07

## 2019-06-02 RX ADMIN — Medication 10 MILLIGRAM(S): at 17:08

## 2019-06-02 RX ADMIN — DOCOSANOL 1 APPLICATION(S): 100 CREAM TOPICAL at 23:33

## 2019-06-02 RX ADMIN — MAGNESIUM OXIDE 400 MG ORAL TABLET 400 MILLIGRAM(S): 241.3 TABLET ORAL at 17:07

## 2019-06-02 RX ADMIN — Medication 40 MILLIGRAM(S): at 17:07

## 2019-06-02 RX ADMIN — Medication 100 MILLIGRAM(S): at 12:24

## 2019-06-02 RX ADMIN — Medication 3 MILLILITER(S): at 06:05

## 2019-06-02 RX ADMIN — APIXABAN 5 MILLIGRAM(S): 2.5 TABLET, FILM COATED ORAL at 17:06

## 2019-06-02 RX ADMIN — Medication 3 MILLILITER(S): at 12:22

## 2019-06-02 RX ADMIN — PANTOPRAZOLE SODIUM 40 MILLIGRAM(S): 20 TABLET, DELAYED RELEASE ORAL at 06:06

## 2019-06-02 RX ADMIN — MONTELUKAST 10 MILLIGRAM(S): 4 TABLET, CHEWABLE ORAL at 12:23

## 2019-06-02 RX ADMIN — SPIRONOLACTONE 25 MILLIGRAM(S): 25 TABLET, FILM COATED ORAL at 06:06

## 2019-06-02 RX ADMIN — ATORVASTATIN CALCIUM 20 MILLIGRAM(S): 80 TABLET, FILM COATED ORAL at 21:09

## 2019-06-02 RX ADMIN — Medication 3 MILLILITER(S): at 23:26

## 2019-06-02 NOTE — CONSULT NOTE ADULT - SUBJECTIVE AND OBJECTIVE BOX
MRN-[id number]    CHIEF COMPLAINT:  Shortness of Breath    HISTORY OF PRESENT ILLNESS:  ANDRE BARKER is a 74y Female patient with past medical history of HFrEF s/p AICD, asthma, HTN, HLD, atrial fibrillation on eliquis, presents with worsening shortness of breath, wheezing, and cough found with asthma exacerbation. She has multiple triggers including smells and dust thought thought to have been exacerbated by kristina recent weather. BNP normal 25. hs-troponin negative for ACS and at baseline 50, 41 in the setting of CKD stage 3. Prior ECHO 3/2018 difficult to assess LV function but reported moderate LV dysfunction. Cardiac Cath 3/2018 with normal coronaries. Cardiology consulted for further management.     Allergies  Coreg (Other)  digoxin (Other; Short breath (Mild to Mod))  Lopressor (Short breath)  penicillins (Hives)    Intolerances  metoprolol (Other)  Solu-Medrol (Other (Mild to Mod))  	  PAST MEDICAL & SURGICAL HISTORY:  Vertigo  Atrial flutter  Diverticulitis  Cardiomyopathy  Kidney stone  Cardiac Pacemaker  HTN - Hypertension  Gout  Diabetes  Congestive Heart Failure  Asthma  S/P cholecystectomy  AICD (Automatic Cardioverter/Defibrillator) Present: inserted in Aug, 2008. Due for battery change in 1 month. ( [employer]) . Inserted by Dr Duffy    FAMILY HISTORY:  Family history of brain tumor    SOCIAL HISTORY:    Non-smoker    REVIEW OF SYSTEMS:  CONSTITUTIONAL: No fever, weight loss, Positive fatigue  EYES: No eye pain, visual disturbances  ENMT:  No difficulty hearing, tinnitus  NECK: No pain or stiffness  RESPIRATORY: No chills or hemoptysis; Positive Shortness of Breath, cough, and wheezing.   CARDIOVASCULAR: No chest pain, palpitations, passing out, dizziness, or leg swelling  GASTROINTESTINAL: No abdominal or epigastric pain. No nausea, vomiting, or hematemesis; No diarrhea or constipation.  NEUROLOGICAL: No headaches, memory loss  SKIN: No itching, burning.   LYMPH Nodes: No enlarged glands  MUSCULOSKELETAL: No joint pain or swelling  PSYCHIATRIC: No depression, anxiety  HEME/LYMPH: No easy bruising.     I&O's Summary    31 May 2019 07:01  -  2019 07:00  --------------------------------------------------------  IN: 245 mL / OUT: 0 mL / NET: 245 mL    2019 07:01  -  2019 00:17  --------------------------------------------------------  IN: 460 mL / OUT: 0 mL / NET: 460 mL    PHYSICAL EXAM:  Vital Signs Last 24 Hrs  T(C): 36.4 (2019 21:13), Max: 36.7 (2019 13:46)  T(F): 97.6 (2019 21:13), Max: 98.1 (2019 13:46)  HR: 91 (2019 21:13) (85 - 91)  BP: 130/76 (2019 21:13) (117/80 - 130/76)  RR: 18 (2019 21:13) (18 - 18)  SpO2: 96% (2019 21:13) (96% - 97%)    Appearance: Normal	  HEENT:   Normal oral mucosa	  Lymphatic: No lymphadenopathy  Cardiovascular: Normal S1 S2, No edema  Respiratory: wheezing end expiration  Psychiatry: A & O x 3  Gastrointestinal:  Soft, Non-tender  Skin: No rashes	  Neurologic: Non-focal  Extremities: No clubbing, cyanosis or edema  Vascular: Peripheral pulses palpable 2+ bilaterally    MEDICATIONS:  MEDICATIONS  (STANDING):  ALBUTerol/ipratropium for Nebulization 3 milliLiter(s) Nebulizer every 6 hours  apixaban 5 milliGRAM(s) Oral every 12 hours  aspirin enteric coated 81 milliGRAM(s) Oral daily  atorvastatin 20 milliGRAM(s) Oral at bedtime  dextrose 5%. 1000 milliLiter(s) (50 mL/Hr) IV Continuous <Continuous>  dextrose 50% Injectable 12.5 Gram(s) IV Push once  enalapril 10 milliGRAM(s) Oral two times a day  furosemide    Tablet 40 milliGRAM(s) Oral two times a day  insulin glargine Injectable (LANTUS) 12 Unit(s) SubCutaneous at bedtime  insulin lispro (HumaLOG) corrective regimen sliding scale   SubCutaneous three times a day before meals  loratadine 10 milliGRAM(s) Oral daily  magnesium oxide 400 milliGRAM(s) Oral three times a day with meals  montelukast 10 milliGRAM(s) Oral daily  pantoprazole    Tablet 40 milliGRAM(s) Oral before breakfast  predniSONE   Tablet 60 milliGRAM(s) Oral daily  spironolactone 25 milliGRAM(s) Oral daily    MEDICATIONS  (PRN):  acetaminophen   Tablet .. 650 milliGRAM(s) Oral every 6 hours PRN Temp greater or equal to 38C (100.4F), Moderate Pain (4 - 6)  dextrose 40% Gel 15 Gram(s) Oral once PRN Blood Glucose LESS THAN 70 milliGRAM(s)/deciliter  glucagon  Injectable 1 milliGRAM(s) IntraMuscular once PRN Glucose LESS THAN 70 milligrams/deciliter  guaiFENesin    Syrup 100 milliGRAM(s) Oral every 6 hours PRN Cough    LABS:	 	  CBC Full  -  ( 31 May 2019 10:29 )  WBC Count : 6.7 K/uL  Hemoglobin : 10.6 g/dL  Hematocrit : 33.7 %  Platelet Count - Automated : 182 K/uL  Mean Cell Volume : 95.8 fl  Mean Cell Hemoglobin : 30.1 pg  Mean Cell Hemoglobin Concentration : 31.4 gm/dL  Auto Neutrophil # : 4.2 K/uL  Auto Lymphocyte # : 1.8 K/uL  Auto Monocyte # : 0.5 K/uL  Auto Eosinophil # : 0.2 K/uL  Auto Basophil # : 0.0 K/uL  Auto Neutrophil % : 62.8 %  Auto Lymphocyte % : 26.3 %  Auto Monocyte % : 7.9 %  Auto Eosinophil % : 2.8 %  Auto Basophil % : 0.2 %        138  |  100  |  31<H>  ----------------------------<  82  4.2   |  26  |  1.28    Ca    9.2      31 May 2019 10:29    TPro  7.0  /  Alb  4.0  /  TBili  0.3  /  DBili  x   /  AST  20  /  ALT  12  /  AlkPhos  81      PT/INR - ( 31 May 2019 10:29 )   PT: 16.4 sec;   INR: 1.42 ratio      PTT - ( 31 May 2019 10:29 )  PTT:33.6 sec    proBNP: 25    TELEMETRY: 2019  NSR, a sensed, v paced.     EC2019  a sensed, v paced.     CARDIAC TESTING/STUDIES:    Echocardiogram: 3/9/2018  Conclusions:  1. Endocardial visualization enhanced with intravenous  injection of echo contrast (Definity).  Despite the use of  echo contrast the visualization endocardium was still  suboptimal. At least, Moderate-severe segmental left  ventricular systolic dysfunction. Unable to calculate EF  accurately The  apical cap are akinetic.  *** Compared with echocardiogram of 2017, difficult to  compare- LV function similar.    Catheterization: 3/8/2018  PROCEDURE:  --  Left heart catheterization.  --  Left coronary angiography.  --  Right coronary angiography.    CORONARY VESSELS: The coronary circulation is right dominant.  LM:   --  LM: Normal.  LAD:   --  LAD: Normal.  CX:   --  Circumflex: Normal.  RCA:   --  RCA: Normal.    COMPLICATIONS: There were no complications.    DIAGNOSTIC RECOMMENDATIONS: The patient should continue with the present medications.    ASSESSMENT/PLAN: 	  74y Female patient with past medical history of HFrEF s/p AICD, asthma, HTN, HLD, atrial fibrillation on apixaban, presents with asthma exacerbation.     1) HFrEF, non-ischemic  Difficult assessment of LVEF (unknown EF; moderate to severe LV dysfunction)  s/p AICD   Compensated   BNP normal 25    ·	No further cardiac testing recommended.   ·	Continue treatment of primary medical problem; asthma/bronchospastic disease.   ·	Continue medical management with enalapril 10 mg daily, spironolactone 25 mg daily, furosemide PO 40 mg BID.   ·	Selective beta-1 antagonist metoprolol or mixed alpha/beta antagonist carvedilol have been researched in patients with asthma/bronchospastic disease found with little detrimental effect on FEV1, particularly the use of metoprolol up to 100 mg/day. Although listed in in allergies/intolerance, something to readdress outpatient.     2) Non-thrombotic troponin elevation   Baseline hs-troponin 30-55 setting of CKD stage 3.  Cardiac cath 3/2018 normal coronaries.     ·	Continue medical management as above.     3) AF  ECG a sensed, v paced   Elevated CHADS2-Vasc     ·	Continue medical management with apixaban 5 mg BID.     No active cardiac issues. Signing off. Please call with questions.     Andrew Morales MD, MPH, PATRICK  Cardiovascular Specialist Attending  [facility]  C: [phone]  E: [email or web address]  (Cardiology Nocturnist cell number available 7 pm - 7 am every night; available daytime week days for follow-up only; daytime weekends covered by general cardiology consult service) MRN-[id number]    CHIEF COMPLAINT:  Shortness of Breath    HISTORY OF PRESENT ILLNESS:  ANDRE BARKER is a 74y Female patient with past medical history of HFrEF s/p AICD, asthma, HTN, HLD, atrial fibrillation on eliquis, presents with worsening shortness of breath, wheezing, and cough found with asthma exacerbation. She has multiple triggers including smells and dust thought thought to have been exacerbated by kristina recent weather. BNP normal 25. hs-troponin negative for ACS and at baseline 50, 41 in the setting of CKD stage 3. Prior ECHO 3/2018 difficult to assess LV function but reported moderate LV dysfunction. Cardiac Cath 3/2018 with normal coronaries. Cardiology consulted for further management.     Allergies  Coreg (Other)  digoxin (Other; Short breath (Mild to Mod))  Lopressor (Short breath)  penicillins (Hives)    Intolerances  metoprolol (Other)  Solu-Medrol (Other (Mild to Mod))  	  PAST MEDICAL & SURGICAL HISTORY:  Vertigo  Atrial flutter  Diverticulitis  Cardiomyopathy  Kidney stone  Cardiac Pacemaker  HTN - Hypertension  Gout  Diabetes  Congestive Heart Failure  Asthma  S/P cholecystectomy  AICD (Automatic Cardioverter/Defibrillator) Present: inserted in Aug, 2008. Due for battery change in 1 month. ( [employer]) . Inserted by Dr Duffy    FAMILY HISTORY:  Family history of brain tumor    SOCIAL HISTORY:    Non-smoker    REVIEW OF SYSTEMS:  CONSTITUTIONAL: No fever, weight loss, Positive fatigue  EYES: No eye pain, visual disturbances  ENMT:  No difficulty hearing, tinnitus  NECK: No pain or stiffness  RESPIRATORY: No chills or hemoptysis; Positive Shortness of Breath, cough, and wheezing.   CARDIOVASCULAR: No chest pain, palpitations, passing out, dizziness, or leg swelling  GASTROINTESTINAL: No abdominal or epigastric pain. No nausea, vomiting, or hematemesis; No diarrhea or constipation.  NEUROLOGICAL: No headaches, memory loss  SKIN: No itching, burning.   LYMPH Nodes: No enlarged glands  MUSCULOSKELETAL: No joint pain or swelling  PSYCHIATRIC: No depression, anxiety  HEME/LYMPH: No easy bruising.     I&O's Summary    31 May 2019 07:01  -  2019 07:00  --------------------------------------------------------  IN: 245 mL / OUT: 0 mL / NET: 245 mL    2019 07:01  -  2019 00:17  --------------------------------------------------------  IN: 460 mL / OUT: 0 mL / NET: 460 mL    PHYSICAL EXAM:  Vital Signs Last 24 Hrs  T(C): 36.4 (2019 21:13), Max: 36.7 (2019 13:46)  T(F): 97.6 (2019 21:13), Max: 98.1 (2019 13:46)  HR: 91 (2019 21:13) (85 - 91)  BP: 130/76 (2019 21:13) (117/80 - 130/76)  RR: 18 (2019 21:13) (18 - 18)  SpO2: 96% (2019 21:13) (96% - 97%)    Appearance: Normal	  HEENT:   Normal oral mucosa	  Lymphatic: No lymphadenopathy  Cardiovascular: Normal S1 S2, No edema  Respiratory: wheezing end expiration  Psychiatry: A & O x 3  Gastrointestinal:  Soft, Non-tender  Skin: No rashes	  Neurologic: Non-focal  Extremities: No clubbing, cyanosis or edema  Vascular: Peripheral pulses palpable 2+ bilaterally    MEDICATIONS:  MEDICATIONS  (STANDING):  ALBUTerol/ipratropium for Nebulization 3 milliLiter(s) Nebulizer every 6 hours  apixaban 5 milliGRAM(s) Oral every 12 hours  aspirin enteric coated 81 milliGRAM(s) Oral daily  atorvastatin 20 milliGRAM(s) Oral at bedtime  dextrose 5%. 1000 milliLiter(s) (50 mL/Hr) IV Continuous <Continuous>  dextrose 50% Injectable 12.5 Gram(s) IV Push once  enalapril 10 milliGRAM(s) Oral two times a day  furosemide    Tablet 40 milliGRAM(s) Oral two times a day  insulin glargine Injectable (LANTUS) 12 Unit(s) SubCutaneous at bedtime  insulin lispro (HumaLOG) corrective regimen sliding scale   SubCutaneous three times a day before meals  loratadine 10 milliGRAM(s) Oral daily  magnesium oxide 400 milliGRAM(s) Oral three times a day with meals  montelukast 10 milliGRAM(s) Oral daily  pantoprazole    Tablet 40 milliGRAM(s) Oral before breakfast  predniSONE   Tablet 60 milliGRAM(s) Oral daily  spironolactone 25 milliGRAM(s) Oral daily    MEDICATIONS  (PRN):  acetaminophen   Tablet .. 650 milliGRAM(s) Oral every 6 hours PRN Temp greater or equal to 38C (100.4F), Moderate Pain (4 - 6)  dextrose 40% Gel 15 Gram(s) Oral once PRN Blood Glucose LESS THAN 70 milliGRAM(s)/deciliter  glucagon  Injectable 1 milliGRAM(s) IntraMuscular once PRN Glucose LESS THAN 70 milligrams/deciliter  guaiFENesin    Syrup 100 milliGRAM(s) Oral every 6 hours PRN Cough    LABS:	 	  CBC Full  -  ( 31 May 2019 10:29 )  WBC Count : 6.7 K/uL  Hemoglobin : 10.6 g/dL  Hematocrit : 33.7 %  Platelet Count - Automated : 182 K/uL  Mean Cell Volume : 95.8 fl  Mean Cell Hemoglobin : 30.1 pg  Mean Cell Hemoglobin Concentration : 31.4 gm/dL  Auto Neutrophil # : 4.2 K/uL  Auto Lymphocyte # : 1.8 K/uL  Auto Monocyte # : 0.5 K/uL  Auto Eosinophil # : 0.2 K/uL  Auto Basophil # : 0.0 K/uL  Auto Neutrophil % : 62.8 %  Auto Lymphocyte % : 26.3 %  Auto Monocyte % : 7.9 %  Auto Eosinophil % : 2.8 %  Auto Basophil % : 0.2 %        138  |  100  |  31<H>  ----------------------------<  82  4.2   |  26  |  1.28    Ca    9.2      31 May 2019 10:29    TPro  7.0  /  Alb  4.0  /  TBili  0.3  /  DBili  x   /  AST  20  /  ALT  12  /  AlkPhos  81      PT/INR - ( 31 May 2019 10:29 )   PT: 16.4 sec;   INR: 1.42 ratio      PTT - ( 31 May 2019 10:29 )  PTT:33.6 sec    proBNP: 25    TELEMETRY: 2019  NSR, a sensed, v paced.     EC2019  a sensed, v paced.     CARDIAC TESTING/STUDIES:    Echocardiogram: 3/9/2018  Conclusions:  1. Endocardial visualization enhanced with intravenous  injection of echo contrast (Definity).  Despite the use of  echo contrast the visualization endocardium was still  suboptimal. At least, Moderate-severe segmental left  ventricular systolic dysfunction. Unable to calculate EF  accurately The  apical cap are akinetic.  *** Compared with echocardiogram of 2017, difficult to  compare- LV function similar.    Catheterization: 3/8/2018  PROCEDURE:  --  Left heart catheterization.  --  Left coronary angiography.  --  Right coronary angiography.    CORONARY VESSELS: The coronary circulation is right dominant.  LM:   --  LM: Normal.  LAD:   --  LAD: Normal.  CX:   --  Circumflex: Normal.  RCA:   --  RCA: Normal.    COMPLICATIONS: There were no complications.    DIAGNOSTIC RECOMMENDATIONS: The patient should continue with the present medications.    ASSESSMENT/PLAN: 	  74y Female patient with past medical history of HFrEF s/p AICD, asthma, HTN, HLD, atrial fibrillation on apixaban, presents with asthma exacerbation.     1) HFrEF, non-ischemic  Difficult assessment of LVEF (unknown EF; moderate to severe LV dysfunction)  s/p AICD   Compensated   BNP normal 25    ·	No further cardiac testing recommended.   ·	Continue treatment of primary medical problem; asthma/bronchospastic disease.   ·	Continue medical management with enalapril 10 mg daily, spironolactone 25 mg daily, furosemide PO 40 mg BID.   ·	Selective beta-1 antagonist metoprolol or mixed alpha/beta antagonist carvedilol have been researched in patients with asthma/bronchospastic disease found with little detrimental effect on FEV1, particularly the use of metoprolol up to 100 mg/day. Although listed in in allergies/intolerance, something to readdress outpatient.     2) Non-thrombotic troponin elevation   Baseline hs-troponin 30-55 setting of CKD stage 3.  Cardiac cath 3/2018 normal coronaries.     ·	Continue medical management as above.     3) AF  ECG a sensed, v paced   Elevated CHADS2-Vasc     ·	Continue medical management with apixaban 5 mg BID.     No active cardiac issues. Signing off. Please call with questions.     Andrew Morales MD, MPH, PATRICK  Cardiovascular Specialist Attending  [facility]  C: [phone]  E: [email or web address]  (Cardiology Nocturnist cell number available 7 pm - 7 am every night; available daytime week days for follow-up only; daytime weekends covered by general cardiology consult service)

## 2019-06-03 ENCOUNTER — TRANSCRIPTION ENCOUNTER (OUTPATIENT)
Age: 74
End: 2019-06-03

## 2019-06-03 VITALS — HEART RATE: 85 BPM | DIASTOLIC BLOOD PRESSURE: 63 MMHG | SYSTOLIC BLOOD PRESSURE: 97 MMHG

## 2019-06-03 LAB
ANION GAP SERPL CALC-SCNC: 15 MMOL/L — SIGNIFICANT CHANGE UP (ref 5–17)
BASOPHILS # BLD AUTO: 0 K/UL — SIGNIFICANT CHANGE UP (ref 0–0.2)
BASOPHILS NFR BLD AUTO: 0 % — SIGNIFICANT CHANGE UP (ref 0–2)
BUN SERPL-MCNC: 42 MG/DL — HIGH (ref 7–23)
CALCIUM SERPL-MCNC: 9.8 MG/DL — SIGNIFICANT CHANGE UP (ref 8.4–10.5)
CHLORIDE SERPL-SCNC: 98 MMOL/L — SIGNIFICANT CHANGE UP (ref 96–108)
CO2 SERPL-SCNC: 27 MMOL/L — SIGNIFICANT CHANGE UP (ref 22–31)
CREAT SERPL-MCNC: 1.15 MG/DL — SIGNIFICANT CHANGE UP (ref 0.5–1.3)
EOSINOPHIL # BLD AUTO: 0 K/UL — SIGNIFICANT CHANGE UP (ref 0–0.5)
EOSINOPHIL NFR BLD AUTO: 0.2 % — SIGNIFICANT CHANGE UP (ref 0–6)
GLUCOSE BLDC GLUCOMTR-MCNC: 128 MG/DL — HIGH (ref 70–99)
GLUCOSE BLDC GLUCOMTR-MCNC: 194 MG/DL — HIGH (ref 70–99)
GLUCOSE BLDC GLUCOMTR-MCNC: 311 MG/DL — HIGH (ref 70–99)
GLUCOSE SERPL-MCNC: 109 MG/DL — HIGH (ref 70–99)
HCT VFR BLD CALC: 35.1 % — SIGNIFICANT CHANGE UP (ref 34.5–45)
HGB BLD-MCNC: 11.7 G/DL — SIGNIFICANT CHANGE UP (ref 11.5–15.5)
LYMPHOCYTES # BLD AUTO: 1.5 K/UL — SIGNIFICANT CHANGE UP (ref 1–3.3)
LYMPHOCYTES # BLD AUTO: 13.3 % — SIGNIFICANT CHANGE UP (ref 13–44)
MCHC RBC-ENTMCNC: 31.9 PG — SIGNIFICANT CHANGE UP (ref 27–34)
MCHC RBC-ENTMCNC: 33.4 GM/DL — SIGNIFICANT CHANGE UP (ref 32–36)
MCV RBC AUTO: 95.4 FL — SIGNIFICANT CHANGE UP (ref 80–100)
MONOCYTES # BLD AUTO: 1 K/UL — HIGH (ref 0–0.9)
MONOCYTES NFR BLD AUTO: 8.9 % — SIGNIFICANT CHANGE UP (ref 2–14)
NEUTROPHILS # BLD AUTO: 8.6 K/UL — HIGH (ref 1.8–7.4)
NEUTROPHILS NFR BLD AUTO: 77.6 % — HIGH (ref 43–77)
PLATELET # BLD AUTO: 214 K/UL — SIGNIFICANT CHANGE UP (ref 150–400)
POTASSIUM SERPL-MCNC: 4.8 MMOL/L — SIGNIFICANT CHANGE UP (ref 3.5–5.3)
POTASSIUM SERPL-SCNC: 4.8 MMOL/L — SIGNIFICANT CHANGE UP (ref 3.5–5.3)
RBC # BLD: 3.68 M/UL — LOW (ref 3.8–5.2)
RBC # FLD: 13.4 % — SIGNIFICANT CHANGE UP (ref 10.3–14.5)
SODIUM SERPL-SCNC: 140 MMOL/L — SIGNIFICANT CHANGE UP (ref 135–145)
WBC # BLD: 11.1 K/UL — HIGH (ref 3.8–10.5)
WBC # FLD AUTO: 11.1 K/UL — HIGH (ref 3.8–10.5)

## 2019-06-03 PROCEDURE — 93005 ELECTROCARDIOGRAM TRACING: CPT

## 2019-06-03 PROCEDURE — 87633 RESP VIRUS 12-25 TARGETS: CPT

## 2019-06-03 PROCEDURE — 80048 BASIC METABOLIC PNL TOTAL CA: CPT

## 2019-06-03 PROCEDURE — 83880 ASSAY OF NATRIURETIC PEPTIDE: CPT

## 2019-06-03 PROCEDURE — 94640 AIRWAY INHALATION TREATMENT: CPT

## 2019-06-03 PROCEDURE — 87486 CHLMYD PNEUM DNA AMP PROBE: CPT

## 2019-06-03 PROCEDURE — 85027 COMPLETE CBC AUTOMATED: CPT

## 2019-06-03 PROCEDURE — 80053 COMPREHEN METABOLIC PANEL: CPT

## 2019-06-03 PROCEDURE — 99223 1ST HOSP IP/OBS HIGH 75: CPT

## 2019-06-03 PROCEDURE — 85610 PROTHROMBIN TIME: CPT

## 2019-06-03 PROCEDURE — 87581 M.PNEUMON DNA AMP PROBE: CPT

## 2019-06-03 PROCEDURE — 83036 HEMOGLOBIN GLYCOSYLATED A1C: CPT

## 2019-06-03 PROCEDURE — 82962 GLUCOSE BLOOD TEST: CPT

## 2019-06-03 PROCEDURE — 71045 X-RAY EXAM CHEST 1 VIEW: CPT

## 2019-06-03 PROCEDURE — 87798 DETECT AGENT NOS DNA AMP: CPT

## 2019-06-03 PROCEDURE — 99239 HOSP IP/OBS DSCHRG MGMT >30: CPT

## 2019-06-03 PROCEDURE — 84484 ASSAY OF TROPONIN QUANT: CPT

## 2019-06-03 PROCEDURE — 99285 EMERGENCY DEPT VISIT HI MDM: CPT | Mod: 25

## 2019-06-03 PROCEDURE — 85730 THROMBOPLASTIN TIME PARTIAL: CPT

## 2019-06-03 RX ADMIN — Medication 81 MILLIGRAM(S): at 13:04

## 2019-06-03 RX ADMIN — Medication 3 UNIT(S): at 17:06

## 2019-06-03 RX ADMIN — Medication 10 MILLIGRAM(S): at 05:42

## 2019-06-03 RX ADMIN — Medication 3 MILLILITER(S): at 17:12

## 2019-06-03 RX ADMIN — APIXABAN 5 MILLIGRAM(S): 2.5 TABLET, FILM COATED ORAL at 05:42

## 2019-06-03 RX ADMIN — APIXABAN 5 MILLIGRAM(S): 2.5 TABLET, FILM COATED ORAL at 17:06

## 2019-06-03 RX ADMIN — Medication 8: at 12:51

## 2019-06-03 RX ADMIN — Medication 3 UNIT(S): at 08:41

## 2019-06-03 RX ADMIN — DOCOSANOL 1 APPLICATION(S): 100 CREAM TOPICAL at 13:02

## 2019-06-03 RX ADMIN — MAGNESIUM OXIDE 400 MG ORAL TABLET 400 MILLIGRAM(S): 241.3 TABLET ORAL at 17:06

## 2019-06-03 RX ADMIN — MAGNESIUM OXIDE 400 MG ORAL TABLET 400 MILLIGRAM(S): 241.3 TABLET ORAL at 08:40

## 2019-06-03 RX ADMIN — Medication 3 MILLILITER(S): at 05:41

## 2019-06-03 RX ADMIN — LORATADINE 10 MILLIGRAM(S): 10 TABLET ORAL at 13:04

## 2019-06-03 RX ADMIN — PANTOPRAZOLE SODIUM 40 MILLIGRAM(S): 20 TABLET, DELAYED RELEASE ORAL at 05:42

## 2019-06-03 RX ADMIN — Medication 3 UNIT(S): at 12:54

## 2019-06-03 RX ADMIN — Medication 3 MILLILITER(S): at 13:04

## 2019-06-03 RX ADMIN — Medication 2: at 17:07

## 2019-06-03 RX ADMIN — Medication 40 MILLIGRAM(S): at 05:42

## 2019-06-03 RX ADMIN — Medication 100 MILLIGRAM(S): at 13:00

## 2019-06-03 RX ADMIN — Medication 60 MILLIGRAM(S): at 05:42

## 2019-06-03 RX ADMIN — MAGNESIUM OXIDE 400 MG ORAL TABLET 400 MILLIGRAM(S): 241.3 TABLET ORAL at 13:04

## 2019-06-03 RX ADMIN — SPIRONOLACTONE 25 MILLIGRAM(S): 25 TABLET, FILM COATED ORAL at 05:42

## 2019-06-03 RX ADMIN — MONTELUKAST 10 MILLIGRAM(S): 4 TABLET, CHEWABLE ORAL at 13:04

## 2019-06-03 NOTE — DISCHARGE NOTE PROVIDER - CARE PROVIDER_API CALL
Neena Comer)  Internal Medicine  [address] [place] [address]  Phone: [phone]  Fax: [phone]  Follow Up Time: Neena Comer)  Internal Medicine  [address] [place] [address]  Phone: [phone]  Fax: [phone]  Follow Up Time:     Balaji Hobson; MPH)  Adv Heart Fail Trnsplnt Cardio; Cardiology  [address] [place] [address]  Phone: [phone]  Fax: [phone]  Follow Up Time:

## 2019-06-03 NOTE — PROGRESS NOTE ADULT - PROBLEM SELECTOR PLAN 5
c/w lantus 12uqhs, patient on sliding scale at home- continue   resume repaglinide on discharge
c/w lantus 12uqhs  aiss fs tid ac  hold repaglinide
c/w lantus 12uqhs  aiss fs tid ac  hold repaglinide

## 2019-06-03 NOTE — PROGRESS NOTE ADULT - PROBLEM SELECTOR PLAN 3
plan as above  monitor  hold meds if SBP <100

## 2019-06-03 NOTE — DISCHARGE NOTE NURSING/CASE MANAGEMENT/SOCIAL WORK - NSDCDPATPORTLINK_GEN_ALL_CORE
You can access the [facility], offered by [facility], by registering with the following website: [email or web address]

## 2019-06-03 NOTE — PROGRESS NOTE ADULT - PROBLEM SELECTOR PROBLEM 2
HFrEF (heart failure with reduced ejection fraction)
72.5

## 2019-06-03 NOTE — PROGRESS NOTE ADULT - SUBJECTIVE AND OBJECTIVE BOX
Patient is a 74y old  Female who presents with a chief complaint of sob (31 May 2019 17:10)      SUBJECTIVE / OVERNIGHT EVENTS: Patient feels ok, no new complaints.   No events over night.   ROS other wise negative.     T(C): 36.4 (06-03-19 @ 05:40), Max: 36.4 (06-03-19 @ 05:40)  HR: 81 (06-03-19 @ 05:40) (81 - 81)  BP: 135/78 (06-03-19 @ 05:40) (135/78 - 135/78)  RR: 16 (06-03-19 @ 05:40) (16 - 16)  SpO2: 94% (06-03-19 @ 05:40) (94% - 94%)    MEDICATIONS  (STANDING):  ALBUTerol/ipratropium for Nebulization 3 milliLiter(s) Nebulizer every 6 hours  apixaban 5 milliGRAM(s) Oral every 12 hours  aspirin enteric coated 81 milliGRAM(s) Oral daily  atorvastatin 20 milliGRAM(s) Oral at bedtime  dextrose 5%. 1000 milliLiter(s) (50 mL/Hr) IV Continuous <Continuous>  dextrose 50% Injectable 12.5 Gram(s) IV Push once  docosanol 10% Cream 1 Application(s) Topical daily  enalapril 10 milliGRAM(s) Oral two times a day  furosemide    Tablet 40 milliGRAM(s) Oral two times a day  insulin glargine Injectable (LANTUS) 12 Unit(s) SubCutaneous at bedtime  insulin lispro (HumaLOG) corrective regimen sliding scale   SubCutaneous three times a day before meals  insulin lispro Injectable (HumaLOG) 3 Unit(s) SubCutaneous three times a day before meals  loratadine 10 milliGRAM(s) Oral daily  magnesium oxide 400 milliGRAM(s) Oral three times a day with meals  montelukast 10 milliGRAM(s) Oral daily  pantoprazole    Tablet 40 milliGRAM(s) Oral before breakfast  predniSONE   Tablet 60 milliGRAM(s) Oral daily  spironolactone 25 milliGRAM(s) Oral daily    MEDICATIONS  (PRN):  acetaminophen   Tablet .. 650 milliGRAM(s) Oral every 6 hours PRN Temp greater or equal to 38C (100.4F), Moderate Pain (4 - 6)  dextrose 40% Gel 15 Gram(s) Oral once PRN Blood Glucose LESS THAN 70 milliGRAM(s)/deciliter  glucagon  Injectable 1 milliGRAM(s) IntraMuscular once PRN Glucose LESS THAN 70 milligrams/deciliter  guaiFENesin    Syrup 100 milliGRAM(s) Oral every 6 hours PRN Cough    PHYSICAL EXAM:  GENERAL: NAD, well-developed  HEAD:  Atraumatic, Normocephalic  EYES: EOMI, conjunctiva and sclera clear  NECK: Supple, No JVD  CHEST/LUNG: scattered wheeze   HEART: Regular rate and rhythm; No murmurs, rubs, or gallops  ABDOMEN: Soft, Nontender, Nondistended; Bowel sounds present  EXTREMITIES:  2+ Peripheral Pulses, No clubbing, cyanosis, or edema  PSYCH: AAOx3  NEUROLOGY: non-focal  SKIN: No rashes or lesions                       no new labs     CAPILLARY BLOOD GLUCOSE    CAPILLARY BLOOD GLUCOSE      POCT Blood Glucose.: 311 mg/dL (03 Jun 2019 12:08)  POCT Blood Glucose.: 128 mg/dL (03 Jun 2019 07:49)  POCT Blood Glucose.: 229 mg/dL (02 Jun 2019 21:28)  POCT Blood Glucose.: 292 mg/dL (02 Jun 2019 16:44)        RADIOLOGY & ADDITIONAL TESTS:    Imaging Personally Reviewed:    Consultant(s) Notes Reviewed:      Care Discussed with Consultants/Other Providers:
Patient is a 74y old  Female who presents with a chief complaint of sob (31 May 2019 17:10)      SUBJECTIVE / OVERNIGHT EVENTS: Patient feels ok, no new complaints.   No events over night.       T(C): 36.4 (06-02-19 @ 17:03), Max: 36.5 (06-02-19 @ 14:15)  HR: 88 (06-02-19 @ 17:03) (88 - 93)  BP: 124/73 (06-02-19 @ 17:03) (120/51 - 124/73)  RR: 18 (06-02-19 @ 17:03) (18 - 18)  SpO2: 94% (06-02-19 @ 17:03) (94% - 97%)    MEDICATIONS  (STANDING):  ALBUTerol/ipratropium for Nebulization 3 milliLiter(s) Nebulizer every 6 hours  apixaban 5 milliGRAM(s) Oral every 12 hours  aspirin enteric coated 81 milliGRAM(s) Oral daily  atorvastatin 20 milliGRAM(s) Oral at bedtime  dextrose 5%. 1000 milliLiter(s) (50 mL/Hr) IV Continuous <Continuous>  dextrose 50% Injectable 12.5 Gram(s) IV Push once  enalapril 10 milliGRAM(s) Oral two times a day  furosemide    Tablet 40 milliGRAM(s) Oral two times a day  insulin glargine Injectable (LANTUS) 12 Unit(s) SubCutaneous at bedtime  insulin lispro (HumaLOG) corrective regimen sliding scale   SubCutaneous three times a day before meals  loratadine 10 milliGRAM(s) Oral daily  magnesium oxide 400 milliGRAM(s) Oral three times a day with meals  montelukast 10 milliGRAM(s) Oral daily  pantoprazole    Tablet 40 milliGRAM(s) Oral before breakfast  predniSONE   Tablet 60 milliGRAM(s) Oral daily  spironolactone 25 milliGRAM(s) Oral daily    MEDICATIONS  (PRN):  acetaminophen   Tablet .. 650 milliGRAM(s) Oral every 6 hours PRN Temp greater or equal to 38C (100.4F), Moderate Pain (4 - 6)  dextrose 40% Gel 15 Gram(s) Oral once PRN Blood Glucose LESS THAN 70 milliGRAM(s)/deciliter  glucagon  Injectable 1 milliGRAM(s) IntraMuscular once PRN Glucose LESS THAN 70 milligrams/deciliter  guaiFENesin    Syrup 100 milliGRAM(s) Oral every 6 hours PRN Cough    PHYSICAL EXAM:  GENERAL: NAD, well-developed  HEAD:  Atraumatic, Normocephalic  EYES: EOMI, conjunctiva and sclera clear  NECK: Supple, No JVD  CHEST/LUNG: scattered wheeze   HEART: Regular rate and rhythm; No murmurs, rubs, or gallops  ABDOMEN: Soft, Nontender, Nondistended; Bowel sounds present  EXTREMITIES:  2+ Peripheral Pulses, No clubbing, cyanosis, or edema  PSYCH: AAOx3  NEUROLOGY: non-focal  SKIN: No rashes or lesions                       no new labs     CAPILLARY BLOOD GLUCOSE      POCT Blood Glucose.: 292 mg/dL (02 Jun 2019 16:44)  POCT Blood Glucose.: 289 mg/dL (02 Jun 2019 12:14)  POCT Blood Glucose.: 128 mg/dL (02 Jun 2019 07:59)  POCT Blood Glucose.: 295 mg/dL (01 Jun 2019 21:13)          RADIOLOGY & ADDITIONAL TESTS:    Imaging Personally Reviewed:    Consultant(s) Notes Reviewed:      Care Discussed with Consultants/Other Providers:
Patient is a 74y old  Female who presents with a chief complaint of sob (31 May 2019 17:10)      SUBJECTIVE / OVERNIGHT EVENTS: Patient feels ok, no new complaints.   No events over night.      T(C): 36.4 (06-01-19 @ 21:13), Max: 36.7 (06-01-19 @ 13:46)  HR: 91 (06-01-19 @ 21:13) (90 - 91)  BP: 130/76 (06-01-19 @ 21:13) (126/75 - 130/76)  RR: 18 (06-01-19 @ 21:13) (18 - 18)  SpO2: 96% (06-01-19 @ 21:13) (96% - 97%)    MEDICATIONS  (STANDING):  ALBUTerol/ipratropium for Nebulization 3 milliLiter(s) Nebulizer every 6 hours  apixaban 5 milliGRAM(s) Oral every 12 hours  aspirin enteric coated 81 milliGRAM(s) Oral daily  atorvastatin 20 milliGRAM(s) Oral at bedtime  dextrose 5%. 1000 milliLiter(s) (50 mL/Hr) IV Continuous <Continuous>  dextrose 50% Injectable 12.5 Gram(s) IV Push once  enalapril 10 milliGRAM(s) Oral two times a day  furosemide    Tablet 40 milliGRAM(s) Oral two times a day  insulin glargine Injectable (LANTUS) 12 Unit(s) SubCutaneous at bedtime  insulin lispro (HumaLOG) corrective regimen sliding scale   SubCutaneous three times a day before meals  loratadine 10 milliGRAM(s) Oral daily  magnesium oxide 400 milliGRAM(s) Oral three times a day with meals  montelukast 10 milliGRAM(s) Oral daily  pantoprazole    Tablet 40 milliGRAM(s) Oral before breakfast  predniSONE   Tablet 60 milliGRAM(s) Oral daily  spironolactone 25 milliGRAM(s) Oral daily    MEDICATIONS  (PRN):  acetaminophen   Tablet .. 650 milliGRAM(s) Oral every 6 hours PRN Temp greater or equal to 38C (100.4F), Moderate Pain (4 - 6)  dextrose 40% Gel 15 Gram(s) Oral once PRN Blood Glucose LESS THAN 70 milliGRAM(s)/deciliter  glucagon  Injectable 1 milliGRAM(s) IntraMuscular once PRN Glucose LESS THAN 70 milligrams/deciliter  guaiFENesin    Syrup 100 milliGRAM(s) Oral every 6 hours PRN Cough        PHYSICAL EXAM:  GENERAL: NAD, well-developed  HEAD:  Atraumatic, Normocephalic  EYES: EOMI, conjunctiva and sclera clear  NECK: Supple, No JVD  CHEST/LUNG: scattered wheeze   HEART: Regular rate and rhythm; No murmurs, rubs, or gallops  ABDOMEN: Soft, Nontender, Nondistended; Bowel sounds present  EXTREMITIES:  2+ Peripheral Pulses, No clubbing, cyanosis, or edema  PSYCH: AAOx3  NEUROLOGY: non-focal  SKIN: No rashes or lesions                          10.6   6.7   )-----------( 182      ( 31 May 2019 10:29 )             33.7           LIVER FUNCTIONS - ( 31 May 2019 10:29 )  Alb: 4.0 g/dL / Pro: 7.0 g/dL / ALK PHOS: 81 U/L / ALT: 12 U/L / AST: 20 U/L / GGT: x           PT/INR - ( 31 May 2019 10:29 )   PT: 16.4 sec;   INR: 1.42 ratio         PTT - ( 31 May 2019 10:29 )  PTT:33.6 sec    CAPILLARY BLOOD GLUCOSE      POCT Blood Glucose.: 295 mg/dL (01 Jun 2019 21:13)  POCT Blood Glucose.: 131 mg/dL (01 Jun 2019 16:34)  POCT Blood Glucose.: 284 mg/dL (01 Jun 2019 12:08)  POCT Blood Glucose.: 213 mg/dL (01 Jun 2019 08:05)        RADIOLOGY & ADDITIONAL TESTS:    Imaging Personally Reviewed:    Consultant(s) Notes Reviewed:      Care Discussed with Consultants/Other Providers:

## 2019-06-03 NOTE — DISCHARGE NOTE PROVIDER - NSDCCPCAREPLAN_GEN_ALL_CORE_FT
PRINCIPAL DISCHARGE DIAGNOSIS  Diagnosis: COPD exacerbation  Assessment and Plan of Treatment: take prescribed medication; f/u with PCP      SECONDARY DISCHARGE DIAGNOSES  Diagnosis: Diabetes mellitus  Assessment and Plan of Treatment: monitor BS; take prescribed medication; f/u with PCP PRINCIPAL DISCHARGE DIAGNOSIS  Diagnosis: COPD exacerbation  Assessment and Plan of Treatment: take prescribed medication; f/u with PCP      SECONDARY DISCHARGE DIAGNOSES  Diagnosis: Atrial flutter  Assessment and Plan of Treatment: take prescribed mediation; f/u with cardiology    Diagnosis: Hypertension  Assessment and Plan of Treatment: take prescribed medication; f/u with PCP    Diagnosis: HFrEF (heart failure with reduced ejection fraction)  Assessment and Plan of Treatment: take prescribed mediation; f/u with cardiology    Diagnosis: Diabetes mellitus  Assessment and Plan of Treatment: monitor BS; take prescribed medication; f/u with PCP

## 2019-06-03 NOTE — PROGRESS NOTE ADULT - PROBLEM SELECTOR PLAN 2
c/w enalapril 10mg bid  not on bblocker due to allergy?  c/w aldactone 25mg daily

## 2019-06-03 NOTE — CONSULT NOTE ADULT - ASSESSMENT
75 yo F with HFrEF (EF 35%) s/p CRT-D, asthma (not on BB d/t severity), HTN, HLD, atrial fibrillation on eliquis, IDDM presents with sob x 1 week in setting of doing some renovation at home after exposure to dust. Improved with prednisone and appears fairly euvolemic however cautioned patient of adverse effects of steroids including hyperglycemia and fluid retention. Reports weight usually 180-185 pounds.   - instructed pt to weight herself daily and if weight increases by 3 pounds or more, take extra lasix and notify office   - given persistent wheezing, reasonable to do steroid taper but would minimize duration as much as possible  - has appt with Dr. Hobson on 6/13 but instructed pt to call office if has worsening symptoms or fluid retention

## 2019-06-03 NOTE — PROGRESS NOTE ADULT - PROBLEM SELECTOR PLAN 1
likely due to trigger being dust  c/w bertha  d/c planning on prednisone taper   saturating well on room air  c/w montelukast  c/w claritin
likely due to trigger being dust  c/w dounebs  prednisone 60mg daily  saturating well on room air  c/w montelukast  c/w claritin
likely due to trigger being dust  c/w dounebs  prednisone 60mg daily  saturating well on room air  c/w montelukast  c/w claritin

## 2019-06-03 NOTE — DISCHARGE NOTE PROVIDER - HOSPITAL COURSE
75 yo F with HFrEF s/p aicd, asthma, HTN, HLD, atrial flutter on eliquis, presents with sob x 1 week due to asthma exacerbation 73 yo F with HFrEF s/p aicd, asthma, HTN, HLD, atrial flutter on eliquis, presents with sob x 1 week due to asthma exacerbation; treated with nebulizer treatments and prednisone; to be d/c on pred taper; BS monitored and treated with sliding scale insulin as needed; breathing improved; stablefor discharge as per attending

## 2019-06-03 NOTE — DISCHARGE NOTE PROVIDER - CARE PROVIDERS DIRECT ADDRESSES
,[email or web address]

## 2019-06-20 ENCOUNTER — OUTPATIENT (OUTPATIENT)
Dept: OUTPATIENT SERVICES | Facility: HOSPITAL | Age: 74
LOS: 1 days | End: 2019-06-20
Payer: MEDICARE

## 2019-06-20 ENCOUNTER — APPOINTMENT (OUTPATIENT)
Dept: CT IMAGING | Facility: IMAGING CENTER | Age: 74
End: 2019-06-20
Payer: MEDICARE

## 2019-06-20 DIAGNOSIS — Z00.8 ENCOUNTER FOR OTHER GENERAL EXAMINATION: ICD-10-CM

## 2019-06-20 PROCEDURE — 71250 CT THORAX DX C-: CPT

## 2019-06-20 PROCEDURE — 71250 CT THORAX DX C-: CPT | Mod: 26

## 2019-06-22 NOTE — ED ADULT NURSE NOTE - DOES PATIENT HAVE ADVANCE DIRECTIVE
Pt complain of right hip pain when moved. Also complain of coughing. Vomit x1 large amount, brown in color, after severe coughing. VS stable.
No

## 2019-06-26 ENCOUNTER — APPOINTMENT (OUTPATIENT)
Dept: CARDIOLOGY | Facility: CLINIC | Age: 74
End: 2019-06-26
Payer: MEDICARE

## 2019-06-26 VITALS
WEIGHT: 188 LBS | BODY MASS INDEX: 34.6 KG/M2 | OXYGEN SATURATION: 95 % | HEART RATE: 92 BPM | DIASTOLIC BLOOD PRESSURE: 62 MMHG | SYSTOLIC BLOOD PRESSURE: 106 MMHG | HEIGHT: 62 IN

## 2019-06-26 DIAGNOSIS — R06.2 WHEEZING: ICD-10-CM

## 2019-06-26 PROCEDURE — 99214 OFFICE O/P EST MOD 30 MIN: CPT

## 2019-06-26 RX ORDER — FUROSEMIDE 40 MG/1
40 TABLET ORAL
Qty: 60 | Refills: 3 | Status: ACTIVE | COMMUNITY
Start: 2017-06-20 | End: 1900-01-01

## 2019-06-26 RX ORDER — ATORVASTATIN CALCIUM 20 MG/1
20 TABLET, FILM COATED ORAL DAILY
Qty: 30 | Refills: 3 | Status: ACTIVE | COMMUNITY
Start: 2017-03-27 | End: 1900-01-01

## 2019-06-26 NOTE — REASON FOR VISIT
[Follow-Up - Clinic] : a clinic follow-up of [Cardiomyopathy] : cardiomyopathy [Dyspnea] : dyspnea [Heart Failure] : congestive heart failure [Other: _____] : [unfilled]

## 2019-06-30 ENCOUNTER — INPATIENT (INPATIENT)
Facility: HOSPITAL | Age: 74
LOS: 32 days | Discharge: NURSING, REHAB OR LONG-TERM CARE | DRG: 853 | End: 2019-08-02
Attending: SURGERY | Admitting: SPECIALIST
Payer: MEDICARE

## 2019-06-30 VITALS
TEMPERATURE: 98 F | HEART RATE: 73 BPM | DIASTOLIC BLOOD PRESSURE: 78 MMHG | OXYGEN SATURATION: 96 % | HEIGHT: 61 IN | RESPIRATION RATE: 18 BRPM | WEIGHT: 220.02 LBS | SYSTOLIC BLOOD PRESSURE: 138 MMHG

## 2019-06-30 DIAGNOSIS — K57.32 DIVERTICULITIS OF LARGE INTESTINE WITHOUT PERFORATION OR ABSCESS WITHOUT BLEEDING: ICD-10-CM

## 2019-06-30 LAB
ALBUMIN SERPL ELPH-MCNC: 3.9 G/DL — SIGNIFICANT CHANGE UP (ref 3.3–5)
ALBUMIN SERPL ELPH-MCNC: 4.2 G/DL — SIGNIFICANT CHANGE UP (ref 3.3–5)
ALP SERPL-CCNC: 80 U/L — SIGNIFICANT CHANGE UP (ref 40–120)
ALP SERPL-CCNC: 93 U/L — SIGNIFICANT CHANGE UP (ref 40–120)
ALT FLD-CCNC: 16 U/L — SIGNIFICANT CHANGE UP (ref 10–45)
ALT FLD-CCNC: 16 U/L — SIGNIFICANT CHANGE UP (ref 10–45)
ANION GAP SERPL CALC-SCNC: 16 MMOL/L — SIGNIFICANT CHANGE UP (ref 5–17)
ANION GAP SERPL CALC-SCNC: 18 MMOL/L — HIGH (ref 5–17)
APPEARANCE UR: CLEAR — SIGNIFICANT CHANGE UP
AST SERPL-CCNC: 18 U/L — SIGNIFICANT CHANGE UP (ref 10–40)
AST SERPL-CCNC: 29 U/L — SIGNIFICANT CHANGE UP (ref 10–40)
BACTERIA # UR AUTO: ABNORMAL
BASE EXCESS BLDV CALC-SCNC: -1 MMOL/L — SIGNIFICANT CHANGE UP (ref -2–2)
BASE EXCESS BLDV CALC-SCNC: 3.2 MMOL/L — HIGH (ref -2–2)
BASOPHILS # BLD AUTO: 0 K/UL — SIGNIFICANT CHANGE UP (ref 0–0.2)
BASOPHILS NFR BLD AUTO: 0.1 % — SIGNIFICANT CHANGE UP (ref 0–2)
BILIRUB SERPL-MCNC: 0.5 MG/DL — SIGNIFICANT CHANGE UP (ref 0.2–1.2)
BILIRUB SERPL-MCNC: 0.8 MG/DL — SIGNIFICANT CHANGE UP (ref 0.2–1.2)
BILIRUB UR-MCNC: NEGATIVE — SIGNIFICANT CHANGE UP
BUN SERPL-MCNC: 31 MG/DL — HIGH (ref 7–23)
BUN SERPL-MCNC: 32 MG/DL — HIGH (ref 7–23)
CA-I SERPL-SCNC: 1.17 MMOL/L — SIGNIFICANT CHANGE UP (ref 1.12–1.3)
CA-I SERPL-SCNC: 1.2 MMOL/L — SIGNIFICANT CHANGE UP (ref 1.12–1.3)
CALCIUM SERPL-MCNC: 9.4 MG/DL — SIGNIFICANT CHANGE UP (ref 8.4–10.5)
CALCIUM SERPL-MCNC: 9.6 MG/DL — SIGNIFICANT CHANGE UP (ref 8.4–10.5)
CHLORIDE BLDV-SCNC: 101 MMOL/L — SIGNIFICANT CHANGE UP (ref 96–108)
CHLORIDE BLDV-SCNC: 102 MMOL/L — SIGNIFICANT CHANGE UP (ref 96–108)
CHLORIDE SERPL-SCNC: 95 MMOL/L — LOW (ref 96–108)
CHLORIDE SERPL-SCNC: 97 MMOL/L — SIGNIFICANT CHANGE UP (ref 96–108)
CO2 BLDV-SCNC: 27 MMOL/L — SIGNIFICANT CHANGE UP (ref 22–30)
CO2 BLDV-SCNC: 31 MMOL/L — HIGH (ref 22–30)
CO2 SERPL-SCNC: 22 MMOL/L — SIGNIFICANT CHANGE UP (ref 22–31)
CO2 SERPL-SCNC: 25 MMOL/L — SIGNIFICANT CHANGE UP (ref 22–31)
COLOR SPEC: SIGNIFICANT CHANGE UP
CREAT SERPL-MCNC: 1.41 MG/DL — HIGH (ref 0.5–1.3)
CREAT SERPL-MCNC: 1.44 MG/DL — HIGH (ref 0.5–1.3)
DIFF PNL FLD: NEGATIVE — SIGNIFICANT CHANGE UP
EOSINOPHIL # BLD AUTO: 0.2 K/UL — SIGNIFICANT CHANGE UP (ref 0–0.5)
EOSINOPHIL NFR BLD AUTO: 2 % — SIGNIFICANT CHANGE UP (ref 0–6)
EPI CELLS # UR: 3 /HPF — SIGNIFICANT CHANGE UP
GAS PNL BLDV: 136 MMOL/L — SIGNIFICANT CHANGE UP (ref 135–145)
GAS PNL BLDV: 139 MMOL/L — SIGNIFICANT CHANGE UP (ref 135–145)
GAS PNL BLDV: SIGNIFICANT CHANGE UP
GLUCOSE BLDC GLUCOMTR-MCNC: 184 MG/DL — HIGH (ref 70–99)
GLUCOSE BLDV-MCNC: 146 MG/DL — HIGH (ref 70–99)
GLUCOSE BLDV-MCNC: 211 MG/DL — HIGH (ref 70–99)
GLUCOSE SERPL-MCNC: 157 MG/DL — HIGH (ref 70–99)
GLUCOSE SERPL-MCNC: 229 MG/DL — HIGH (ref 70–99)
GLUCOSE UR QL: NEGATIVE — SIGNIFICANT CHANGE UP
HCO3 BLDV-SCNC: 26 MMOL/L — SIGNIFICANT CHANGE UP (ref 21–29)
HCO3 BLDV-SCNC: 29 MMOL/L — SIGNIFICANT CHANGE UP (ref 21–29)
HCT VFR BLD CALC: 34.1 % — LOW (ref 34.5–45)
HCT VFR BLD CALC: 41.3 % — SIGNIFICANT CHANGE UP (ref 34.5–45)
HCT VFR BLDA CALC: 34 % — LOW (ref 39–50)
HCT VFR BLDA CALC: 42 % — SIGNIFICANT CHANGE UP (ref 39–50)
HGB BLD CALC-MCNC: 11.1 G/DL — LOW (ref 11.5–15.5)
HGB BLD CALC-MCNC: 13.9 G/DL — SIGNIFICANT CHANGE UP (ref 11.5–15.5)
HGB BLD-MCNC: 11.7 G/DL — SIGNIFICANT CHANGE UP (ref 11.5–15.5)
HGB BLD-MCNC: 14.4 G/DL — SIGNIFICANT CHANGE UP (ref 11.5–15.5)
HYALINE CASTS # UR AUTO: 0 /LPF — SIGNIFICANT CHANGE UP (ref 0–2)
KETONES UR-MCNC: NEGATIVE — SIGNIFICANT CHANGE UP
LACTATE BLDV-MCNC: 2 MMOL/L — SIGNIFICANT CHANGE UP (ref 0.7–2)
LACTATE BLDV-MCNC: 2.6 MMOL/L — HIGH (ref 0.7–2)
LEUKOCYTE ESTERASE UR-ACNC: ABNORMAL
LIDOCAIN IGE QN: 11 U/L — SIGNIFICANT CHANGE UP (ref 7–60)
LYMPHOCYTES # BLD AUTO: 2.4 K/UL — SIGNIFICANT CHANGE UP (ref 1–3.3)
LYMPHOCYTES # BLD AUTO: 28.6 % — SIGNIFICANT CHANGE UP (ref 13–44)
MCHC RBC-ENTMCNC: 32.8 PG — SIGNIFICANT CHANGE UP (ref 27–34)
MCHC RBC-ENTMCNC: 33.3 PG — SIGNIFICANT CHANGE UP (ref 27–34)
MCHC RBC-ENTMCNC: 34.4 GM/DL — SIGNIFICANT CHANGE UP (ref 32–36)
MCHC RBC-ENTMCNC: 34.9 GM/DL — SIGNIFICANT CHANGE UP (ref 32–36)
MCV RBC AUTO: 95.3 FL — SIGNIFICANT CHANGE UP (ref 80–100)
MCV RBC AUTO: 95.5 FL — SIGNIFICANT CHANGE UP (ref 80–100)
MONOCYTES # BLD AUTO: 0.6 K/UL — SIGNIFICANT CHANGE UP (ref 0–0.9)
MONOCYTES NFR BLD AUTO: 7 % — SIGNIFICANT CHANGE UP (ref 2–14)
NEUTROPHILS # BLD AUTO: 5.2 K/UL — SIGNIFICANT CHANGE UP (ref 1.8–7.4)
NEUTROPHILS NFR BLD AUTO: 62.2 % — SIGNIFICANT CHANGE UP (ref 43–77)
NITRITE UR-MCNC: NEGATIVE — SIGNIFICANT CHANGE UP
OTHER CELLS CSF MANUAL: 6 ML/DL — LOW (ref 18–22)
PCO2 BLDV: 52 MMHG — HIGH (ref 35–50)
PCO2 BLDV: 56 MMHG — HIGH (ref 35–50)
PH BLDV: 7.31 — LOW (ref 7.35–7.45)
PH BLDV: 7.34 — LOW (ref 7.35–7.45)
PH UR: 7 — SIGNIFICANT CHANGE UP (ref 5–8)
PLATELET # BLD AUTO: 228 K/UL — SIGNIFICANT CHANGE UP (ref 150–400)
PLATELET # BLD AUTO: 248 K/UL — SIGNIFICANT CHANGE UP (ref 150–400)
PO2 BLDV: 28 MMHG — SIGNIFICANT CHANGE UP (ref 25–45)
PO2 BLDV: 40 MMHG — SIGNIFICANT CHANGE UP (ref 25–45)
POTASSIUM BLDV-SCNC: 4.4 MMOL/L — SIGNIFICANT CHANGE UP (ref 3.5–5.3)
POTASSIUM BLDV-SCNC: 5.7 MMOL/L — HIGH (ref 3.5–5.3)
POTASSIUM SERPL-MCNC: 5.1 MMOL/L — SIGNIFICANT CHANGE UP (ref 3.5–5.3)
POTASSIUM SERPL-MCNC: 5.3 MMOL/L — SIGNIFICANT CHANGE UP (ref 3.5–5.3)
POTASSIUM SERPL-SCNC: 5.1 MMOL/L — SIGNIFICANT CHANGE UP (ref 3.5–5.3)
POTASSIUM SERPL-SCNC: 5.3 MMOL/L — SIGNIFICANT CHANGE UP (ref 3.5–5.3)
PROT SERPL-MCNC: 7.6 G/DL — SIGNIFICANT CHANGE UP (ref 6–8.3)
PROT SERPL-MCNC: 7.8 G/DL — SIGNIFICANT CHANGE UP (ref 6–8.3)
PROT UR-MCNC: NEGATIVE — SIGNIFICANT CHANGE UP
RBC # BLD: 3.57 M/UL — LOW (ref 3.8–5.2)
RBC # BLD: 4.32 M/UL — SIGNIFICANT CHANGE UP (ref 3.8–5.2)
RBC # FLD: 13.5 % — SIGNIFICANT CHANGE UP (ref 10.3–14.5)
RBC # FLD: 13.5 % — SIGNIFICANT CHANGE UP (ref 10.3–14.5)
RBC CASTS # UR COMP ASSIST: 3 /HPF — SIGNIFICANT CHANGE UP (ref 0–4)
SAO2 % BLDV: 42 % — LOW (ref 67–88)
SAO2 % BLDV: 67 % — SIGNIFICANT CHANGE UP (ref 67–88)
SODIUM SERPL-SCNC: 135 MMOL/L — SIGNIFICANT CHANGE UP (ref 135–145)
SODIUM SERPL-SCNC: 138 MMOL/L — SIGNIFICANT CHANGE UP (ref 135–145)
SP GR SPEC: 1.01 — SIGNIFICANT CHANGE UP (ref 1.01–1.02)
UROBILINOGEN FLD QL: NEGATIVE — SIGNIFICANT CHANGE UP
WBC # BLD: 3.9 K/UL — SIGNIFICANT CHANGE UP (ref 3.8–10.5)
WBC # BLD: 8.4 K/UL — SIGNIFICANT CHANGE UP (ref 3.8–10.5)
WBC # FLD AUTO: 3.9 K/UL — SIGNIFICANT CHANGE UP (ref 3.8–10.5)
WBC # FLD AUTO: 8.4 K/UL — SIGNIFICANT CHANGE UP (ref 3.8–10.5)
WBC UR QL: 10 /HPF — HIGH (ref 0–5)

## 2019-06-30 PROCEDURE — 74177 CT ABD & PELVIS W/CONTRAST: CPT | Mod: 26

## 2019-06-30 PROCEDURE — 71045 X-RAY EXAM CHEST 1 VIEW: CPT | Mod: 26

## 2019-06-30 PROCEDURE — 99291 CRITICAL CARE FIRST HOUR: CPT

## 2019-06-30 PROCEDURE — 99285 EMERGENCY DEPT VISIT HI MDM: CPT

## 2019-06-30 RX ORDER — ACETAMINOPHEN 500 MG
1000 TABLET ORAL ONCE
Refills: 0 | Status: COMPLETED | OUTPATIENT
Start: 2019-06-30 | End: 2019-06-30

## 2019-06-30 RX ORDER — MORPHINE SULFATE 50 MG/1
4 CAPSULE, EXTENDED RELEASE ORAL ONCE
Refills: 0 | Status: DISCONTINUED | OUTPATIENT
Start: 2019-06-30 | End: 2019-06-30

## 2019-06-30 RX ORDER — PANTOPRAZOLE SODIUM 20 MG/1
40 TABLET, DELAYED RELEASE ORAL
Refills: 0 | Status: DISCONTINUED | OUTPATIENT
Start: 2019-06-30 | End: 2019-06-30

## 2019-06-30 RX ORDER — FUROSEMIDE 40 MG
40 TABLET ORAL ONCE
Refills: 0 | Status: COMPLETED | OUTPATIENT
Start: 2019-06-30 | End: 2019-06-30

## 2019-06-30 RX ORDER — METRONIDAZOLE 500 MG
500 TABLET ORAL EVERY 8 HOURS
Refills: 0 | Status: DISCONTINUED | OUTPATIENT
Start: 2019-06-30 | End: 2019-07-02

## 2019-06-30 RX ORDER — ASPIRIN/CALCIUM CARB/MAGNESIUM 324 MG
81 TABLET ORAL DAILY
Refills: 0 | Status: DISCONTINUED | OUTPATIENT
Start: 2019-06-30 | End: 2019-07-01

## 2019-06-30 RX ORDER — INSULIN LISPRO 100/ML
VIAL (ML) SUBCUTANEOUS EVERY 6 HOURS
Refills: 0 | Status: DISCONTINUED | OUTPATIENT
Start: 2019-06-30 | End: 2019-07-01

## 2019-06-30 RX ORDER — PANTOPRAZOLE SODIUM 20 MG/1
40 TABLET, DELAYED RELEASE ORAL ONCE
Refills: 0 | Status: DISCONTINUED | OUTPATIENT
Start: 2019-06-30 | End: 2019-06-30

## 2019-06-30 RX ORDER — NITROGLYCERIN 6.5 MG
0.4 CAPSULE, EXTENDED RELEASE ORAL ONCE
Refills: 0 | Status: COMPLETED | OUTPATIENT
Start: 2019-06-30 | End: 2019-06-30

## 2019-06-30 RX ORDER — IPRATROPIUM/ALBUTEROL SULFATE 18-103MCG
3 AEROSOL WITH ADAPTER (GRAM) INHALATION ONCE
Refills: 0 | Status: COMPLETED | OUTPATIENT
Start: 2019-06-30 | End: 2019-06-30

## 2019-06-30 RX ORDER — METRONIDAZOLE 500 MG
500 TABLET ORAL ONCE
Refills: 0 | Status: COMPLETED | OUTPATIENT
Start: 2019-06-30 | End: 2019-06-30

## 2019-06-30 RX ORDER — CIPROFLOXACIN LACTATE 400MG/40ML
400 VIAL (ML) INTRAVENOUS EVERY 12 HOURS
Refills: 0 | Status: DISCONTINUED | OUTPATIENT
Start: 2019-06-30 | End: 2019-07-01

## 2019-06-30 RX ORDER — IPRATROPIUM/ALBUTEROL SULFATE 18-103MCG
3 AEROSOL WITH ADAPTER (GRAM) INHALATION EVERY 6 HOURS
Refills: 0 | Status: DISCONTINUED | OUTPATIENT
Start: 2019-06-30 | End: 2019-07-01

## 2019-06-30 RX ORDER — CIPROFLOXACIN LACTATE 400MG/40ML
400 VIAL (ML) INTRAVENOUS ONCE
Refills: 0 | Status: COMPLETED | OUTPATIENT
Start: 2019-06-30 | End: 2019-06-30

## 2019-06-30 RX ORDER — MONTELUKAST 4 MG/1
10 TABLET, CHEWABLE ORAL DAILY
Refills: 0 | Status: DISCONTINUED | OUTPATIENT
Start: 2019-06-30 | End: 2019-07-01

## 2019-06-30 RX ORDER — NALOXONE HYDROCHLORIDE 4 MG/.1ML
0.4 SPRAY NASAL ONCE
Refills: 0 | Status: COMPLETED | OUTPATIENT
Start: 2019-06-30 | End: 2019-06-30

## 2019-06-30 RX ORDER — PANTOPRAZOLE SODIUM 20 MG/1
40 TABLET, DELAYED RELEASE ORAL DAILY
Refills: 0 | Status: DISCONTINUED | OUTPATIENT
Start: 2019-06-30 | End: 2019-07-02

## 2019-06-30 RX ORDER — CHLORHEXIDINE GLUCONATE 213 G/1000ML
1 SOLUTION TOPICAL
Refills: 0 | Status: DISCONTINUED | OUTPATIENT
Start: 2019-06-30 | End: 2019-07-01

## 2019-06-30 RX ORDER — ATORVASTATIN CALCIUM 80 MG/1
20 TABLET, FILM COATED ORAL AT BEDTIME
Refills: 0 | Status: DISCONTINUED | OUTPATIENT
Start: 2019-06-30 | End: 2019-07-01

## 2019-06-30 RX ORDER — APIXABAN 2.5 MG/1
5 TABLET, FILM COATED ORAL EVERY 12 HOURS
Refills: 0 | Status: DISCONTINUED | OUTPATIENT
Start: 2019-06-30 | End: 2019-07-01

## 2019-06-30 RX ADMIN — NALOXONE HYDROCHLORIDE 0.4 MILLIGRAM(S): 4 SPRAY NASAL at 18:15

## 2019-06-30 RX ADMIN — Medication 200 MILLIGRAM(S): at 17:19

## 2019-06-30 RX ADMIN — Medication 1: at 23:40

## 2019-06-30 RX ADMIN — Medication 125 MILLIGRAM(S): at 17:50

## 2019-06-30 RX ADMIN — Medication 0.4 MILLIGRAM(S): at 18:02

## 2019-06-30 RX ADMIN — Medication 100 MILLIGRAM(S): at 18:29

## 2019-06-30 RX ADMIN — Medication 100 MILLIGRAM(S): at 23:21

## 2019-06-30 RX ADMIN — MORPHINE SULFATE 4 MILLIGRAM(S): 50 CAPSULE, EXTENDED RELEASE ORAL at 16:00

## 2019-06-30 RX ADMIN — MORPHINE SULFATE 4 MILLIGRAM(S): 50 CAPSULE, EXTENDED RELEASE ORAL at 16:53

## 2019-06-30 RX ADMIN — Medication 1000 MILLIGRAM(S): at 18:00

## 2019-06-30 RX ADMIN — Medication 40 MILLIGRAM(S): at 17:50

## 2019-06-30 RX ADMIN — Medication 40 MILLIGRAM(S): at 18:15

## 2019-06-30 RX ADMIN — MORPHINE SULFATE 4 MILLIGRAM(S): 50 CAPSULE, EXTENDED RELEASE ORAL at 16:04

## 2019-06-30 RX ADMIN — Medication 3 MILLILITER(S): at 17:50

## 2019-06-30 RX ADMIN — MORPHINE SULFATE 4 MILLIGRAM(S): 50 CAPSULE, EXTENDED RELEASE ORAL at 15:32

## 2019-06-30 RX ADMIN — Medication 400 MILLIGRAM(S): at 22:12

## 2019-06-30 RX ADMIN — Medication 1000 MILLIGRAM(S): at 22:10

## 2019-06-30 RX ADMIN — Medication 400 MILLIGRAM(S): at 18:16

## 2019-06-30 RX ADMIN — Medication 3 MILLILITER(S): at 17:52

## 2019-06-30 NOTE — ED ADULT NURSE NOTE - NSIMPLEMENTINTERV_GEN_ALL_ED
Implemented All Fall Risk Interventions:  [place] to call system. Call bell, personal items and telephone within reach. Instruct patient to call for assistance. Room bathroom lighting operational. Non-slip footwear when patient is off stretcher. Physically safe environment: no spills, clutter or unnecessary equipment. Stretcher in lowest position, wheels locked, appropriate side rails in place. Provide visual cue, wrist band, yellow gown, etc. Monitor gait and stability. Monitor for mental status changes and reorient to person, place, and time. Review medications for side effects contributing to fall risk. Reinforce activity limits and safety measures with patient and family.

## 2019-06-30 NOTE — ED PROVIDER NOTE - PHYSICAL EXAMINATION
Vitals: WNL  Gen: screaming in pain, clutching her stomach  Head: NCAT  ENT: sclerae white, anicterus, moist mucous membranes.   CV: RRR. Audible S1 and S2. No murmurs, rubs, gallops, S3, nor S4, 2+ radial and DP pulses   Pulm: Clear to auscultation bilaterally. No wheezes, rales, or rhonchi  Abd: soft, hypoactive BS, LUQ/LLQ ttp, no rebound, +voluntary guarding, no rashes  Musculoskeletal:  No peripheral edema  Skin: no lesions or scars noted  Neurologic: AAOx3  : no CVA tenderness  Psych: no SI/HI

## 2019-06-30 NOTE — ED PROVIDER NOTE - OBJECTIVE STATEMENT
75yo F h/o HFrEF s/p aicd, asthma, HTN, HLD, atrial fibrillation on eliquis p/w severe, sudden onset L sided abd pain that started this AM. pt had just finished eating when she developed L abd pain. Denies f/c, n/v, diarrhea/constipation, melena, hematochezia. Abd surgeries: previous peg, s/p cholecystectomy

## 2019-06-30 NOTE — H&P ADULT - HISTORY OF PRESENT ILLNESS
74 F with PMHx of HFrEF s/p AICD, asthma, HTN, HLD, atrial fibrillation on eliquis p/w severe, sudden onset L sided abd pain that started this AM. Patient had just finished her meal when she developed left sided abdominal pain. She reports nausea but no vomiting. She denies any fevers, chills, diarrhea or hematochezia. Patient denies having abdominal pain like this in the past.      Upon arrival to ED, patient with stable vitals. She received 4mg of IV morphine for pain. She subsequently went for CT of abdomen/pelvis with IV contrast to evaluate for cause of abdominal pain. After CT, she received additional morphine for pain. Shortly after that, patient started to have palpitations and shortess of breath. She started to have wheezing and became hypoxic. Bedside US by ED was limited 2/2 to habitus, but it revealed a few B lines. She received 80mg total of IV lasix, 125 of solumedrol, duonebs x2 and narcan.      MICU consulted for hypoxic respiratory failure. Upon evaluation, patient's VS , RR 30, O2 sat 100% on 100% FiO2 and /72. Patient's FiO2 decreased to 40% and patient with O2 sat of 100%. Patient admitted to MICU for management of respiratory and heart failure. 74 F with PMHx of HFrEF s/p AICD, asthma, HTN, HLD, atrial fibrillation on eliquis p/w severe, sudden onset L sided abd pain that started this AM. Patient had just finished her meal when she developed left sided abdominal pain. She reports nausea but no vomiting. She denies any fevers, chills, diarrhea or hematochezia. Patient denies having abdominal pain like this in the past.   Upon arrival to ED, patient with stable vitals. She received 4mg of IV morphine for pain. She subsequently went for CT of abdomen/pelvis with IV contrast to evaluate for cause of abdominal pain. After CT, she received additional morphine for pain. Shortly after that, patient started to have palpitations and shortess of breath. She started to have wheezing and became hypoxic. Bedside US by ED was limited 2/2 to habitus, but it revealed a few B lines. She received 80mg total of IV lasix, 125 of solumedrol, duonebs x2 and narcan.   MICU consulted for hypoxic respiratory failure. Upon evaluation, patient's VS , RR 30, O2 sat 100% on 100% FiO2 and /72. Patient's FiO2 decreased to 40% and patient with O2 sat of 100%. Patient admitted to MICU for management of respiratory and heart failure. 74 F [place] with PMHx of HFrEF s/p AICD, asthma, HTN, HLD, atrial fibrillation on eliquis p/w severe, sudden onset L sided abd pain that started this AM. Patient had just finished her meal when she developed left sided abdominal pain. She reports nausea but no vomiting. She denies any fevers, chills, diarrhea or hematochezia. Patient denies having abdominal pain like this in the past.   Upon arrival to ED, patient with stable vitals. She received 4mg of IV morphine for pain. She subsequently went for CT of abdomen/pelvis with IV contrast to evaluate for cause of abdominal pain. After CT, she received additional morphine for pain. Shortly after that, patient started to have palpitations and shortess of breath. She started to have wheezing and became hypoxic. Bedside US by ED was limited 2/2 to habitus, but it revealed a few B lines. She received 80mg total of IV lasix, 125 of solumedrol, duonebs x2 and narcan.   MICU consulted for hypoxic respiratory failure. Upon evaluation, patient's VS , RR 30, O2 sat 100% on 100% FiO2 and /72. Patient's FiO2 decreased to 40% and patient with O2 sat of 100%. Patient admitted to MICU for management of respiratory and heart failure.

## 2019-06-30 NOTE — H&P ADULT - ASSESSMENT
74 F with PMHx of HFrEF s/p AICD, asthma, HTN, HLD, atrial fibrillation on eliquis p/w abdominal pain, found to have diverticulitis. Now transferred to the MICU for respiratory distress following IV contrast administration.    #Neuro  No active issues; mentating at baseline    #CV  HFrEF s/p AICD (EF 35%)  - Currently appears near euvolemic on exam  - s/p 74 F with PMHx of HFrEF s/p AICD, asthma, HTN, HLD, atrial fibrillation on eliquis p/w abdominal pain, found to have diverticulitis. Now transferred to the MICU for respiratory distress following IV contrast administration.    #Neuro  No active issues; mentating at baseline    #CV  HFrEF 2/2 NICM s/p AICD (EF 35%)  - currently appears near euvolemic on exam.  - Pro-BNP and trop wnl   - s/p Lasix 80 IVP in the ED  - hold further diuresis overnight  - can consider restarting home Lasix and spironolactone in AM  - strict I/Os  - daily weights  Afib/Aflutter (on Eliquis)  - c/w home Eliquis  - currently rate controlled    #Resp  Hypoxic respiratory failure 2/2 asthma vs pulmonary edema  - Currently on BiPAP and satting well   - Wean as tolerated  - c/w Duonebs    #GI   Diverticulitis   - start Cipro/Flagyl  - NPO while on BiPAP  - c/w home pantoprazole    #Renal  DIANNA on CKD   - Cr 1.4 on admission (BL 1.1)  - Continue to monitor following diuresis; more likely prerenal than cardiorenal  - f/u urine electrolytes for FeUrea  - hold home ACEI    #Endo  T2DM on home insulin  - ISS  - FS q6h while NPO  - c/w home Lantus 12u qhs     #Prophylaxis  DVT: On Eliquis  Code Status: Full Code 74 F with PMHx of HFrEF s/p AICD, asthma, HTN, HLD, atrial fibrillation on eliquis p/w abdominal pain, found to have diverticulitis. Now transferred to the MICU for respiratory distress following IV contrast administration.    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  - Pro-BNP and trop wnl   - s/p Lasix 80 IVP in the ED  - hold further diuresis overnight  - can consider restarting home Lasix and spironolactone in AM  - strict I/Os  - daily weights  Afib/Aflutter (on Eliquis)  - c/w home Eliquis  - currently rate controlled    #Resp  Hypoxic respiratory failure 2/2 asthma vs pulmonary edema  - Currently on BiPAP and satting well   - Wean as tolerated  - c/w Duonebs  - c/w Solumedrol 40mg IV daily    #GI   Diverticulitis   - start Cipro/Flagyl  - NPO while on BiPAP  - c/w home pantoprazole    #Renal  DIANNA on CKD   - Cr 1.4 on admission (BL 1.1)  - Continue to monitor following diuresis; more likely prerenal than cardiorenal  - f/u urine electrolytes for FeUrea  - hold home ACEI    #ID   Diverticulitis  - c/w Cipro/Flagyl  - Also noted to have UA LE+; covering with cipro    #Endo  T2DM on home insulin  - ISS  - FS q6h while NPO  - c/w home Lantus 12u qhs     #Prophylaxis  DVT: On Eliquis  Code Status: Full Code 74 F with PMHx of HFrEF s/p AICD, asthma, HTN, HLD, atrial fibrillation on eliquis p/w abdominal pain, found to have diverticulitis. Now transferred to the MICU for respiratory distress following IV contrast administration.    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 Received Lasix, steroids, and bronchodilators in the ED. Likely bronchospasm, lower suspicion for pulmonary edema as pt does not appear fluid overloaded on exam.  - Currently on BiPAP and satting well   - Wean as tolerated  - c/w Duonebs PRN  - c/w Solumedrol 20mg q8h IVP    #GI   Diverticulitis   - start Cipro/Flagyl  - NPO while on BiPAP  - c/w home pantoprazole    #Renal  DIANNA on CKD   - Cr 1.4 on admission (BL 1.1)  - Continue to monitor following diuresis; more likely prerenal than cardiorenal  - f/u urine electrolytes for FeUrea  - hold home ACEI    #ID   Diverticulitis  - c/w Cipro/Flagyl  - Also noted to have UA LE+; covering with cipro    #Endo  T2DM on home insulin  - ISS  - FS q6h while NPO  - c/w home Lantus 12u qhs     #Prophylaxis  DVT: On Eliquis  Code Status: Full Code 74 F with PMHx of HFrEF s/p AICD, asthma, HTN, HLD, atrial fibrillation on eliquis p/w abdominal pain, found to have diverticulitis. Now transferred to the MICU for respiratory distress following IV contrast administration.    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 Most likely in setting of intravascular depletion, lower suspicion for septic shock.   - started on phenylephrine gtt  Afib/Aflutter (on Eliquis)  - c/w home Eliquis  - currently rate controlled    #Resp  Hypoxic respiratory failure 2/2 bronchospasm IV contrast reaction vs cardiac asthma  - Acute onset of dyspnea and wheezing following IV contrast administration. Received Lasix, steroids, and bronchodilators in the ED. Likely bronchospasm, lower suspicion for pulmonary edema as pt does not appear fluid overloaded on exam.  - Currently on BiPAP and satting well   - Wean as tolerated  - c/w Duonebs PRN  - c/w Solumedrol 20mg q8h IVP    #GI   Diverticulitis   - start Cipro/Flagyl  - NPO while on BiPAP  - c/w home pantoprazole    #Renal  DIANNA on CKD   - Cr 1.4 on admission (BL 1.1)  - Continue to monitor following diuresis; more likely prerenal than cardiorenal  - f/u urine electrolytes for FeUrea  - hold home ACEI    #ID   Diverticulitis  - c/w Cipro/Flagyl  - Also noted to have UA LE+; covering with cipro    #Endo  T2DM on home insulin  - ISS  - FS q6h while NPO  - c/w home Lantus 12u qhs     #Prophylaxis  DVT: On Eliquis  Code Status: Full Code 74 F with PMHx of HFrEF s/p AICD, asthma, HTN, HLD, atrial fibrillation on eliquis p/w abdominal pain, found to have diverticulitis. Now transferred to the MICU for respiratory distress following IV contrast administration.    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 Most likely in setting of intravascular depletion, lower suspicion for septic shock or cardiogenic shock  - started on phenylephrine gtt  Afib/Aflutter (on Eliquis)  - c/w home Eliquis  - currently rate controlled    #Resp  Hypoxic respiratory failure 2/2 bronchospasm IV contrast reaction vs cardiac asthma  - Acute onset of dyspnea and wheezing following IV contrast administration. Received Lasix, steroids, and bronchodilators in the ED. Likely bronchospasm, lower suspicion for pulmonary edema as pt does not appear fluid overloaded on exam.  - Currently on BiPAP and satting well   - Wean as tolerated  - c/w Duonebs PRN  - c/w Solumedrol 20mg q8h IVP    #GI   Diverticulitis   - start Cipro/Flagyl  - NPO while on BiPAP  - c/w home pantoprazole    #Renal  DIANNA on CKD   - Cr 1.4 on admission (BL 1.1)  - Continue to monitor following diuresis; more likely prerenal than cardiorenal  - f/u urine electrolytes for FeUrea  - hold home ACEI    #ID   Diverticulitis  - c/w Cipro/Flagyl  - Also noted to have UA LE+; covering with cipro    #Endo  T2DM on home insulin  - ISS  - FS q6h while NPO  - c/w home Lantus 12u qhs     #Prophylaxis  DVT: On Eliquis  Code Status: Full Code

## 2019-06-30 NOTE — ED PROVIDER NOTE - ATTENDING CONTRIBUTION TO CARE
Patient is a 75 yo F with history of HTN, hyperlipidemia, afib on eliquis, hx of diverticulitis here for acute onset abdominal pain that started this morning. Pain started after patient finished eating. No nausea, vomiting. No fevers or chills. Patient states she has never had pain like this in the past.     VS noted  Gen. moderate distress secondary to pain, screaming  HEENT: EOMI, mmm  Lungs: CTAB/L no C/ W /R   CVS: RRR   Abd; Soft diffusely tender, L abd > R, + voluntary guarding  Ext: no edema  Skin: no rash  Neuro AAOx3 non focal clear speech  a/p: acute onset abd pain - concern for perforation, diverticulitis - plan for labs, CT A/P, pain control  - Christen ARAGON

## 2019-06-30 NOTE — H&P ADULT - NSHPPHYSICALEXAM_GEN_ALL_CORE
Vital Signs Last 24 Hrs  T(C): 36.7 (30 Jun 2019 20:15), Max: 36.8 (30 Jun 2019 16:50)  T(F): 98.1 (30 Jun 2019 20:15), Max: 98.2 (30 Jun 2019 16:50)  HR: 127 (30 Jun 2019 20:15) (73 - 161)  BP: 99/78 (30 Jun 2019 20:15) (99/78 - 164/84)  BP(mean): --  RR: 28 (30 Jun 2019 20:15) (18 - 32)  SpO2: 99% (30 Jun 2019 20:15) (95% - 100%)    General: ill appearing, tachypneic, on BiPAP  Neurology: A&Ox3, nonfocal,  Head:  Normocephalic, atraumatic  ENT:  Mucosa moist, no ulcerations  Neck:  Supple, no sinuses or palpable masses  Lymphatic:  No palpable cervical, supraclavicular, axillary or inguinal adenopathy  Respiratory: expiratory wheezes bilaterally  CV: tachycardic S1S2, no murmur  Abdominal: Soft, TTP in left lower quadrant, +BS  MSK: No edema, + peripheral pulses,

## 2019-06-30 NOTE — ED ADULT NURSE REASSESSMENT NOTE - NS ED NURSE REASSESS COMMENT FT1
pt becoming increasingly tachycardic and tachypneic. pt HR fluctuating between 130 to 150. pt placed on NRB with nebulized treatments. pt O2 sat 100% at present. ER MD JULISA Fermin and DO ARNALDO Gonzalez at bedside. second IV placed and repeat blood work sent to the lab.

## 2019-06-30 NOTE — H&P ADULT - ATTENDING COMMENTS
74F Hx Chronic A.Fib/Flutt on ELQ, HFrEF 35%, Non-Ischemic Cardiomyopathy S/P AICD, Asthma, Cardiac Cath Negative 3/18, Trach Decannulation Hx p/w ED Lt LQ Abdominal Pain with CT confirmed Lt Sided Diverticulitis Tx with MOS4 and empiric ABx course. She became SOB and Wheezing upon return from CT Scan associated with tachycardia, Tachypnea started on BiPAP.   - Acute Hypoxic Respiratory Failure for close monitoring in ICU on BiPAP support and Intubation prn   - Cardiac Asthma with flushed pulmonary edema cautious over diuresis induced hypotension   - Compensated Heart Failure continue home medication   - Presumed IV Contrast reaction on steroid and bronchodilator support   - NPO for Colonic Diverticulitis, Empiric ABx coverage and Pain Control Plan     Critical care Time = 45 Min

## 2019-06-30 NOTE — CONSULT NOTE ADULT - SUBJECTIVE AND OBJECTIVE BOX
CHIEF COMPLAINT: abdominal pain    HPI:  74 F with PMHx of HFrEF s/p aicd, asthma, HTN, HLD, atrial fibrillation on eliquis p/w severe, sudden onset L sided abd pain that started this AM. Patient had just finished her meal when she developed left sided abdominal pain. She reports nausea but no vomiting. She denies any fevers, chills, diarrhea or hematochezia. Patient denies having abdominal pain like this in the past.     Upon arrival to ED, patient with stable vitals. She received 4mg of IV morphine for pain. She subsequently went for CT of abdomen/pelvis with IV contrast to evaluate for cause of abdominal pain. After CT, she received additional morphine for pain. Shortly after that, patient started to have palpitations and shortess of breath. She started to have wheezing and became hypoxic. Bedside US by ED was limited 2/2 to habitus, but it revealed a few B lines. She received 80mg total of IV lasix, 125 of solumedrol, duonebs x2 and narcan.     MICU consulted for hypoxic respiratory failure.       PAST MEDICAL & SURGICAL HISTORY:  Vertigo  Atrial flutter  Diverticulitis  Cardiomyopathy  Kidney stone  Cardiac Pacemaker  HTN - Hypertension  Gout  Diabetes  Congestive Heart Failure  Asthma  S/P cholecystectomy  AICD (Automatic Cardioverter/Defibrillator) Present: inserted in Aug, 2008. Due for battery change in 1 month. ( [employer]) . Inserted by Dr Duffy      FAMILY HISTORY:  Family history of brain tumor      SOCIAL HISTORY:  Smoking: [ ] Never Smoked [ ] Former Smoker (__ packs x ___ years) [ ] Current Smoker  (__ packs x ___ years)  Substance Use: [ ] Never Used [ ] Used ____  EtOH Use:  Marital Status: [ ] Single [ ]  [ ]  [ ]   Sexual History:   Occupation:  Recent Travel:  Country of Birth:  Advance Directives:    Allergies    Coreg (Other)  digoxin (Other; Short breath (Mild to Mod))  Lopressor (Short breath)  penicillins (Hives)    Intolerances    metoprolol (Other)  Solu-Medrol (Other (Mild to Mod))      HOME MEDICATIONS:    REVIEW OF SYSTEMS:  Constitutional: [ ] negative [ ] fevers [ ] chills [ ] weight loss [ ] weight gain  HEENT: [ ] negative [ ] dry eyes [ ] eye irritation [ ] postnasal drip [ ] nasal congestion  CV: [ ] negative  [ ] chest pain [ ] orthopnea [ ] palpitations [ ] murmur  Resp: [ ] negative [ ] cough [ ] shortness of breath [ ] dyspnea [ ] wheezing [ ] sputum [ ] hemoptysis  GI: [ ] negative [ ] nausea [ ] vomiting [ ] diarrhea [ ] constipation [ ] abd pain [ ] dysphagia   : [ ] negative [ ] dysuria [ ] nocturia [ ] hematuria [ ] increased urinary frequency  Musculoskeletal: [ ] negative [ ] back pain [ ] myalgias [ ] arthralgias [ ] fracture  Skin: [ ] negative [ ] rash [ ] itch  Neurological: [ ] negative [ ] headache [ ] dizziness [ ] syncope [ ] weakness [ ] numbness  Psychiatric: [ ] negative [ ] anxiety [ ] depression  Endocrine: [ ] negative [ ] diabetes [ ] thyroid problem  Hematologic/Lymphatic: [ ] negative [ ] anemia [ ] bleeding problem  Allergic/Immunologic: [ ] negative [ ] itchy eyes [ ] nasal discharge [ ] hives [ ] angioedema  [ ] All other systems negative  [ ] Unable to assess ROS because ________    OBJECTIVE:  ICU Vital Signs Last 24 Hrs  T(C): 36.8 (2019 16:50), Max: 36.8 (2019 16:50)  T(F): 98.2 (2019 16:50), Max: 98.2 (2019 16:50)  HR: 138 (2019 19:01) (73 - 161)  BP: 116/73 (2019 19:01) (106/94 - 164/84)  BP(mean): --  ABP: --  ABP(mean): --  RR: 29 (2019 19:01) (18 - 32)  SpO2: 100% (2019 19:01) (95% - 100%)        CAPILLARY BLOOD GLUCOSE      POCT Blood Glucose.: 173 mg/dL (2019 17:39)      PHYSICAL EXAM:  General:   HEENT:   Lymph Nodes:  Neck:   Respiratory:   Cardiovascular:   Abdomen:   Extremities:   Skin:   Neurological:  Psychiatry:    LINES:     HOSPITAL MEDICATIONS:                              LABS:                        14.4   3.9   )-----------( 248      ( 2019 18:00 )             41.3     Hgb Trend: 14.4<--, 11.7<--  -30    135  |  95<L>  |  32<H>  ----------------------------<  229<H>  5.1   |  22  |  1.44<H>    Ca    9.6      2019 18:00    TPro  7.8  /  Alb  4.2  /  TBili  0.8  /  DBili  x   /  AST  18  /  ALT  16  /  AlkPhos  93      Creatinine Trend: 1.44<--, 1.41<--, 1.15<--    Urinalysis Basic - ( 2019 15:38 )    Color: Light Yellow / Appearance: Clear / S.013 / pH: x  Gluc: x / Ketone: Negative  / Bili: Negative / Urobili: Negative   Blood: x / Protein: Negative / Nitrite: Negative   Leuk Esterase: Large / RBC: 3 /hpf / WBC 10 /HPF   Sq Epi: x / Non Sq Epi: 3 /hpf / Bacteria: Few        Venous Blood Gas:   @ 18:00  7.31/52/40//67  VBG Lactate: 2.6  Venous Blood Gas:   @ 14:45  7.34/56/28/29/42  VBG Lactate: 2.0      MICROBIOLOGY:     RADIOLOGY:  [ ] Reviewed and interpreted by me    EKG:

## 2019-06-30 NOTE — ED PROVIDER NOTE - PROGRESS NOTE DETAILS
Carlos:  Called to bedside, as patient HR went up to 140s, patient RR is in 40s, wheezing b/l. Patient sommnolent. bedside echo limited by patient habitus, do visualize b lines. will start duonebs, give lasix, steroids iv, narcan, place on bipap. Patient slightly more awake, RR improving. HR still in 140s. patient talking full sentences. repeat labs sent off. IV tylenol given for severe pain. Tootie Fermin MD: called to bedside by nurse, pt tachy HR 140s-160s. EKG showed aflutter. Pt somlnolent after receiving 2nd dose of narcan during stay. pt without issues after 1st morphine, was somewhat confused after the initial 1st morphine dose. pt with audible wheezing, concern for copd exacerbation v chf exacerbation. pt given lasix 40mg x2, nitro sublingual, duonebs, steroids, placed on bipap with improvement of RR and tachycardia. Will defer slowing HR for now given concern that tachycardia is compensating for pt's decompensated HF. Tootie Fermin MD: called to bedside by nurse, pt tachy HR 140s-160s. EKG showed aflutter. Pt somlnolent after receiving 2nd dose of morphine during stay. pt without issues after 1st morphine, was somewhat confused after the initial 1st morphine dose. pt with audible wheezing, concern for copd exacerbation v chf exacerbation. pt given lasix 40mg x2, nitro sublingual, duonebs, steroids, placed on bipap with improvement of RR and tachycardia. Will defer slowing HR for now given concern that tachycardia is compensating for pt's decompensated HF.

## 2019-06-30 NOTE — H&P ADULT - NSICDXPASTMEDICALHX_GEN_ALL_CORE_FT
PAST MEDICAL HISTORY:  Asthma     Atrial flutter     Cardiac Pacemaker     Cardiomyopathy     Congestive Heart Failure     Diabetes     Diverticulitis     Gout     HTN - Hypertension     Kidney stone     Vertigo PAST MEDICAL HISTORY:  Asthma     Atrial flutter     Cardiac Pacemaker     Cardiomyopathy     Congestive Heart Failure     Diabetes     Diverticulitis     Gout     HTN - Hypertension     Kidney stone     Patient is [employer]     Vertigo PAST MEDICAL HISTORY:  Asthma     Atrial flutter     Cardiac Pacemaker     Cardiomyopathy     Congestive Heart Failure     Diabetes     Diverticulitis     Gout     HTN - Hypertension     Kidney stone     Patient is [employer]ism     Refusal of blood transfusions as patient is Hoah[employer]ism     Vertigo

## 2019-06-30 NOTE — H&P ADULT - NSHPLABSRESULTS_GEN_ALL_CORE
14.4   3.9   )-----------( 248      ( 2019 18:00 )             41.3       0630    135  |  95<L>  |  32<H>  ----------------------------<  229<H>  5.1   |  22  |  1.44<H>    Ca    9.6      2019 18:00    TPro  7.8  /  Alb  4.2  /  TBili  0.8  /  DBili  x   /  AST  18  /  ALT  16  /  AlkPhos  93  06                18:00 - VBG - pH: 7.31  | pCO2: 52    | pO2: 40    | Lactate: 2.6    14:45 - VBG - pH: 7.34  | pCO2: 56    | pO2: 28    | Lactate: 2.0        Urinalysis Basic - ( 2019 15:38 )    Color: Light Yellow / Appearance: Clear / S.013 / pH: x  Gluc: x / Ketone: Negative  / Bili: Negative / Urobili: Negative   Blood: x / Protein: Negative / Nitrite: Negative   Leuk Esterase: Large / RBC: 3 /hpf / WBC 10 /HPF   Sq Epi: x / Non Sq Epi: 3 /hpf / Bacteria: Few

## 2019-06-30 NOTE — H&P ADULT - NSHPREVIEWOFSYSTEMS_GEN_ALL_CORE
REVIEW OF SYSTEMS:    CONSTITUTIONAL: No weakness, fevers or chills  EYES/ENT: No visual changes;  No vertigo or throat pain   NECK: No pain or stiffness  RESPIRATORY: No cough, wheezing, hemoptysis; No shortness of breath  CARDIOVASCULAR: No chest pain or palpitations  GASTROINTESTINAL:+ abdominal pain. No nausea, vomiting, or hematemesis; No diarrhea or constipation. No melena or hematochezia.  GENITOURINARY: No dysuria, frequency or hematuria  NEUROLOGICAL: No numbness or weakness  SKIN: No itching, rashes Unable to Obtain

## 2019-06-30 NOTE — H&P ADULT - NSICDXPASTSURGICALHX_GEN_ALL_CORE_FT
PAST SURGICAL HISTORY:  AICD (Automatic Cardioverter/Defibrillator) Present inserted in Aug, 2008. Due for battery change in 1 month. ( [employer]) . Inserted by Dr Duffy    S/P cholecystectomy PAST SURGICAL HISTORY:  AICD (Automatic Cardioverter/Defibrillator) Present inserted in Aug, 2008. Due for battery change in 1 month. ( [employer]) . Inserted by Dr Duffy    S/P cholecystectomy PAST SURGICAL HISTORY:  AICD (Automatic Cardioverter/Defibrillator) Present inserted in Aug, 2008. Due for battery change in 1 month. ( [employer]) . Inserted by Dr Duffy    S/P cholecystectomy

## 2019-06-30 NOTE — ED PROVIDER NOTE - CLINICAL SUMMARY MEDICAL DECISION MAKING FREE TEXT BOX
75yo F h/o HFrEF s/p aicd, asthma, HTN, HLD, atrial fibrillation on eliquis p/w severe, sudden onset L sided abd pain that started this AM. Concern for perforation v SBO v abscess given exam and severe pain. Will obtain CXR to eval for free air, CTAP, labs.

## 2019-06-30 NOTE — ED ADULT NURSE NOTE - OBJECTIVE STATEMENT
1350 74 yr old BF brought to ER via ambulance on stretcher for further eval and tx of severe LLq pain x 2 hrs, shortly after eating breakfast and watermelon. Was given Neb tx enroute to ER  by EMS. Scattered exp wheezes upon arrival to ER. Was hospitalized 2 months ago for tx of asthma exacerbation. Upon arrival to ER yelling in pain, looks uncomfortable. abd distended, TTP LLQ. Denies N/VD, fever, chills, dysuria or back pain, chest pain, dizziness, SOB, palp . Wearing diaper. A&Ox4. Fall risk precautions maintained

## 2019-07-01 ENCOUNTER — RESULT REVIEW (OUTPATIENT)
Age: 74
End: 2019-07-01

## 2019-07-01 ENCOUNTER — TRANSCRIPTION ENCOUNTER (OUTPATIENT)
Age: 74
End: 2019-07-01

## 2019-07-01 LAB
ALBUMIN SERPL ELPH-MCNC: 3.6 G/DL — SIGNIFICANT CHANGE UP (ref 3.3–5)
ALP SERPL-CCNC: 75 U/L — SIGNIFICANT CHANGE UP (ref 40–120)
ALT FLD-CCNC: 14 U/L — SIGNIFICANT CHANGE UP (ref 10–45)
ANION GAP SERPL CALC-SCNC: 21 MMOL/L — HIGH (ref 5–17)
AST SERPL-CCNC: 18 U/L — SIGNIFICANT CHANGE UP (ref 10–40)
BILIRUB SERPL-MCNC: 0.9 MG/DL — SIGNIFICANT CHANGE UP (ref 0.2–1.2)
BUN SERPL-MCNC: 38 MG/DL — HIGH (ref 7–23)
CALCIUM SERPL-MCNC: 9.3 MG/DL — SIGNIFICANT CHANGE UP (ref 8.4–10.5)
CHLORIDE SERPL-SCNC: 98 MMOL/L — SIGNIFICANT CHANGE UP (ref 96–108)
CO2 SERPL-SCNC: 17 MMOL/L — LOW (ref 22–31)
CREAT ?TM UR-MCNC: 86 MG/DL — SIGNIFICANT CHANGE UP
CREAT SERPL-MCNC: 2.2 MG/DL — HIGH (ref 0.5–1.3)
GAS PNL BLDA: SIGNIFICANT CHANGE UP
GAS PNL BLDA: SIGNIFICANT CHANGE UP
GAS PNL BLDV: SIGNIFICANT CHANGE UP
GAS PNL BLDV: SIGNIFICANT CHANGE UP
GLUCOSE BLDC GLUCOMTR-MCNC: 191 MG/DL — HIGH (ref 70–99)
GLUCOSE BLDC GLUCOMTR-MCNC: 202 MG/DL — HIGH (ref 70–99)
GLUCOSE BLDC GLUCOMTR-MCNC: 205 MG/DL — HIGH (ref 70–99)
GLUCOSE SERPL-MCNC: 212 MG/DL — HIGH (ref 70–99)
HCT VFR BLD CALC: 42.8 % — SIGNIFICANT CHANGE UP (ref 34.5–45)
HGB BLD-MCNC: 13.8 G/DL — SIGNIFICANT CHANGE UP (ref 11.5–15.5)
INR BLD: 1.54 RATIO — HIGH (ref 0.88–1.16)
MAGNESIUM SERPL-MCNC: 1.7 MG/DL — SIGNIFICANT CHANGE UP (ref 1.6–2.6)
MCHC RBC-ENTMCNC: 31 PG — SIGNIFICANT CHANGE UP (ref 27–34)
MCHC RBC-ENTMCNC: 32.1 GM/DL — SIGNIFICANT CHANGE UP (ref 32–36)
MCV RBC AUTO: 96.4 FL — SIGNIFICANT CHANGE UP (ref 80–100)
OSMOLALITY UR: 458 MOS/KG — SIGNIFICANT CHANGE UP (ref 300–900)
PHOSPHATE SERPL-MCNC: 4.3 MG/DL — SIGNIFICANT CHANGE UP (ref 2.5–4.5)
PLATELET # BLD AUTO: 217 K/UL — SIGNIFICANT CHANGE UP (ref 150–400)
POTASSIUM SERPL-MCNC: 4.6 MMOL/L — SIGNIFICANT CHANGE UP (ref 3.5–5.3)
POTASSIUM SERPL-SCNC: 4.6 MMOL/L — SIGNIFICANT CHANGE UP (ref 3.5–5.3)
POTASSIUM UR-SCNC: 47 MMOL/L — SIGNIFICANT CHANGE UP
PROT SERPL-MCNC: 7.1 G/DL — SIGNIFICANT CHANGE UP (ref 6–8.3)
PROTHROM AB SERPL-ACNC: 17.8 SEC — HIGH (ref 10–12.9)
RBC # BLD: 4.44 M/UL — SIGNIFICANT CHANGE UP (ref 3.8–5.2)
RBC # FLD: 13.8 % — SIGNIFICANT CHANGE UP (ref 10.3–14.5)
SODIUM SERPL-SCNC: 136 MMOL/L — SIGNIFICANT CHANGE UP (ref 135–145)
SODIUM UR-SCNC: 42 MMOL/L — SIGNIFICANT CHANGE UP
UUN UR-MCNC: 297 MG/DL — SIGNIFICANT CHANGE UP
WBC # BLD: 5 K/UL — SIGNIFICANT CHANGE UP (ref 3.8–10.5)
WBC # FLD AUTO: 5 K/UL — SIGNIFICANT CHANGE UP (ref 3.8–10.5)

## 2019-07-01 PROCEDURE — 36556 INSERT NON-TUNNEL CV CATH: CPT | Mod: GC

## 2019-07-01 PROCEDURE — 99291 CRITICAL CARE FIRST HOUR: CPT

## 2019-07-01 PROCEDURE — 71045 X-RAY EXAM CHEST 1 VIEW: CPT | Mod: 26

## 2019-07-01 PROCEDURE — 74176 CT ABD & PELVIS W/O CONTRAST: CPT | Mod: 26

## 2019-07-01 PROCEDURE — 99223 1ST HOSP IP/OBS HIGH 75: CPT | Mod: GC

## 2019-07-01 RX ORDER — AZTREONAM 2 G
VIAL (EA) INJECTION
Refills: 0 | Status: DISCONTINUED | OUTPATIENT
Start: 2019-07-01 | End: 2019-07-01

## 2019-07-01 RX ORDER — INSULIN LISPRO 100/ML
VIAL (ML) SUBCUTANEOUS EVERY 4 HOURS
Refills: 0 | Status: DISCONTINUED | OUTPATIENT
Start: 2019-07-01 | End: 2019-07-02

## 2019-07-01 RX ORDER — HYDROMORPHONE HYDROCHLORIDE 2 MG/ML
0.5 INJECTION INTRAMUSCULAR; INTRAVENOUS; SUBCUTANEOUS ONCE
Refills: 0 | Status: DISCONTINUED | OUTPATIENT
Start: 2019-07-01 | End: 2019-07-01

## 2019-07-01 RX ORDER — SODIUM CHLORIDE 9 MG/ML
500 INJECTION, SOLUTION INTRAVENOUS ONCE
Refills: 0 | Status: COMPLETED | OUTPATIENT
Start: 2019-07-01 | End: 2019-07-01

## 2019-07-01 RX ORDER — HYDROMORPHONE HYDROCHLORIDE 2 MG/ML
1 INJECTION INTRAMUSCULAR; INTRAVENOUS; SUBCUTANEOUS ONCE
Refills: 0 | Status: DISCONTINUED | OUTPATIENT
Start: 2019-07-01 | End: 2019-07-01

## 2019-07-01 RX ORDER — CHLORHEXIDINE GLUCONATE 213 G/1000ML
1 SOLUTION TOPICAL DAILY
Refills: 0 | Status: DISCONTINUED | OUTPATIENT
Start: 2019-07-01 | End: 2019-07-02

## 2019-07-01 RX ORDER — FENTANYL CITRATE 50 UG/ML
12.5 INJECTION INTRAVENOUS ONCE
Refills: 0 | Status: DISCONTINUED | OUTPATIENT
Start: 2019-07-01 | End: 2019-07-01

## 2019-07-01 RX ORDER — ACETAMINOPHEN 500 MG
1000 TABLET ORAL ONCE
Refills: 0 | Status: COMPLETED | OUTPATIENT
Start: 2019-07-01 | End: 2019-07-02

## 2019-07-01 RX ORDER — AZTREONAM 2 G
1000 VIAL (EA) INJECTION EVERY 8 HOURS
Refills: 0 | Status: DISCONTINUED | OUTPATIENT
Start: 2019-07-01 | End: 2019-07-02

## 2019-07-01 RX ORDER — SODIUM CHLORIDE 9 MG/ML
1000 INJECTION, SOLUTION INTRAVENOUS ONCE
Refills: 0 | Status: COMPLETED | OUTPATIENT
Start: 2019-07-01 | End: 2019-07-02

## 2019-07-01 RX ORDER — IPRATROPIUM/ALBUTEROL SULFATE 18-103MCG
3 AEROSOL WITH ADAPTER (GRAM) INHALATION EVERY 4 HOURS
Refills: 0 | Status: DISCONTINUED | OUTPATIENT
Start: 2019-07-01 | End: 2019-07-02

## 2019-07-01 RX ORDER — SODIUM CHLORIDE 9 MG/ML
250 INJECTION INTRAMUSCULAR; INTRAVENOUS; SUBCUTANEOUS ONCE
Refills: 0 | Status: COMPLETED | OUTPATIENT
Start: 2019-07-01 | End: 2019-07-01

## 2019-07-01 RX ORDER — INSULIN GLARGINE 100 [IU]/ML
12 INJECTION, SOLUTION SUBCUTANEOUS AT BEDTIME
Refills: 0 | Status: DISCONTINUED | OUTPATIENT
Start: 2019-07-01 | End: 2019-07-01

## 2019-07-01 RX ORDER — BUDESONIDE, MICRONIZED 100 %
0.5 POWDER (GRAM) MISCELLANEOUS EVERY 12 HOURS
Refills: 0 | Status: DISCONTINUED | OUTPATIENT
Start: 2019-07-01 | End: 2019-07-02

## 2019-07-01 RX ORDER — PHENYLEPHRINE HYDROCHLORIDE 10 MG/ML
0.1 INJECTION INTRAVENOUS
Qty: 40 | Refills: 0 | Status: DISCONTINUED | OUTPATIENT
Start: 2019-07-01 | End: 2019-07-02

## 2019-07-01 RX ORDER — SODIUM CHLORIDE 9 MG/ML
10 INJECTION INTRAMUSCULAR; INTRAVENOUS; SUBCUTANEOUS
Refills: 0 | Status: DISCONTINUED | OUTPATIENT
Start: 2019-07-01 | End: 2019-07-01

## 2019-07-01 RX ORDER — CALCIUM GLUCONATE 100 MG/ML
2 VIAL (ML) INTRAVENOUS ONCE
Refills: 0 | Status: DISCONTINUED | OUTPATIENT
Start: 2019-07-01 | End: 2019-07-02

## 2019-07-01 RX ORDER — PHENYLEPHRINE HYDROCHLORIDE 10 MG/ML
0.1 INJECTION INTRAVENOUS
Qty: 160 | Refills: 0 | Status: DISCONTINUED | OUTPATIENT
Start: 2019-07-01 | End: 2019-07-01

## 2019-07-01 RX ORDER — PHENYLEPHRINE HYDROCHLORIDE 10 MG/ML
0.4 INJECTION INTRAVENOUS
Qty: 40 | Refills: 0 | Status: DISCONTINUED | OUTPATIENT
Start: 2019-07-01 | End: 2019-07-01

## 2019-07-01 RX ORDER — SODIUM CHLORIDE 9 MG/ML
1000 INJECTION, SOLUTION INTRAVENOUS
Refills: 0 | Status: DISCONTINUED | OUTPATIENT
Start: 2019-07-01 | End: 2019-07-02

## 2019-07-01 RX ORDER — FENTANYL CITRATE 50 UG/ML
50 INJECTION INTRAVENOUS ONCE
Refills: 0 | Status: DISCONTINUED | OUTPATIENT
Start: 2019-07-01 | End: 2019-07-01

## 2019-07-01 RX ADMIN — Medication 200 MILLIGRAM(S): at 05:43

## 2019-07-01 RX ADMIN — FENTANYL CITRATE 50 MICROGRAM(S): 50 INJECTION INTRAVENOUS at 17:01

## 2019-07-01 RX ADMIN — Medication 3 MILLILITER(S): at 13:28

## 2019-07-01 RX ADMIN — Medication 200 MILLIGRAM(S): at 17:01

## 2019-07-01 RX ADMIN — FENTANYL CITRATE 12.5 MICROGRAM(S): 50 INJECTION INTRAVENOUS at 12:54

## 2019-07-01 RX ADMIN — SODIUM CHLORIDE 500 MILLILITER(S): 9 INJECTION INTRAMUSCULAR; INTRAVENOUS; SUBCUTANEOUS at 01:03

## 2019-07-01 RX ADMIN — Medication 20 MILLIGRAM(S): at 13:52

## 2019-07-01 RX ADMIN — APIXABAN 5 MILLIGRAM(S): 2.5 TABLET, FILM COATED ORAL at 05:44

## 2019-07-01 RX ADMIN — PHENYLEPHRINE HYDROCHLORIDE 3.12 MICROGRAM(S)/KG/MIN: 10 INJECTION INTRAVENOUS at 12:17

## 2019-07-01 RX ADMIN — Medication 1: at 06:16

## 2019-07-01 RX ADMIN — Medication 20 MILLIGRAM(S): at 05:44

## 2019-07-01 RX ADMIN — CHLORHEXIDINE GLUCONATE 1 APPLICATION(S): 213 SOLUTION TOPICAL at 06:16

## 2019-07-01 RX ADMIN — PHENYLEPHRINE HYDROCHLORIDE 3.12 MICROGRAM(S)/KG/MIN: 10 INJECTION INTRAVENOUS at 17:00

## 2019-07-01 RX ADMIN — FENTANYL CITRATE 12.5 MICROGRAM(S): 50 INJECTION INTRAVENOUS at 12:00

## 2019-07-01 RX ADMIN — HYDROMORPHONE HYDROCHLORIDE 0.5 MILLIGRAM(S): 2 INJECTION INTRAMUSCULAR; INTRAVENOUS; SUBCUTANEOUS at 01:44

## 2019-07-01 RX ADMIN — PHENYLEPHRINE HYDROCHLORIDE 3.12 MICROGRAM(S)/KG/MIN: 10 INJECTION INTRAVENOUS at 05:43

## 2019-07-01 RX ADMIN — SODIUM CHLORIDE 50 MILLILITER(S): 9 INJECTION, SOLUTION INTRAVENOUS at 10:27

## 2019-07-01 RX ADMIN — Medication 2: at 12:44

## 2019-07-01 RX ADMIN — Medication 3 MILLILITER(S): at 09:17

## 2019-07-01 RX ADMIN — Medication 2: at 17:21

## 2019-07-01 RX ADMIN — FENTANYL CITRATE 50 MICROGRAM(S): 50 INJECTION INTRAVENOUS at 16:30

## 2019-07-01 RX ADMIN — MONTELUKAST 10 MILLIGRAM(S): 4 TABLET, CHEWABLE ORAL at 12:53

## 2019-07-01 RX ADMIN — Medication 3 MILLILITER(S): at 17:20

## 2019-07-01 RX ADMIN — Medication 81 MILLIGRAM(S): at 12:45

## 2019-07-01 RX ADMIN — SODIUM CHLORIDE 500 MILLILITER(S): 9 INJECTION INTRAMUSCULAR; INTRAVENOUS; SUBCUTANEOUS at 01:30

## 2019-07-01 RX ADMIN — PANTOPRAZOLE SODIUM 40 MILLIGRAM(S): 20 TABLET, DELAYED RELEASE ORAL at 12:06

## 2019-07-01 RX ADMIN — HYDROMORPHONE HYDROCHLORIDE 0.5 MILLIGRAM(S): 2 INJECTION INTRAMUSCULAR; INTRAVENOUS; SUBCUTANEOUS at 01:24

## 2019-07-01 RX ADMIN — Medication 100 MILLIGRAM(S): at 13:51

## 2019-07-01 RX ADMIN — SODIUM CHLORIDE 500 MILLILITER(S): 9 INJECTION, SOLUTION INTRAVENOUS at 09:15

## 2019-07-01 RX ADMIN — Medication 100 MILLIGRAM(S): at 05:44

## 2019-07-01 NOTE — CONSULT NOTE ADULT - SUBJECTIVE AND OBJECTIVE BOX
Chief Complaint:  Patient is a 74y old  Female who presents with a chief complaint of abdominal pain    HPI:  74 year old female with hx of HFrEF (mod-severe dysfunction) s/p AICD, asthma, Hypertension, hyperlipidemia and Afib presents from home with severe left sided abdominal pain. She descrbes the pain as initially left sided, but now reports the pain is "all over". She denies fevers, chills, vomiting, diarrhea, constipation, melena, hematochezia, hematemesis, cough, history of similar pain      Last Colonoscopy:  Last Endoscopy:    Allergies:  Coreg (Other)  digoxin (Other; Short breath (Mild to Mod))  Lopressor (Short breath)  metoprolol (Other)  penicillins (Hives)  Solu-Medrol (Other (Mild to Mod))      Home Medications:    Hospital Medications:  ALBUTerol/ipratropium for Nebulization 3 milliLiter(s) Nebulizer every 6 hours PRN  ALBUTerol/ipratropium for Nebulization 3 milliLiter(s) Nebulizer every 4 hours  apixaban 5 milliGRAM(s) Oral every 12 hours  aspirin enteric coated 81 milliGRAM(s) Oral daily  atorvastatin 20 milliGRAM(s) Oral at bedtime  chlorhexidine 4% Liquid 1 Application(s) Topical <User Schedule>  ciprofloxacin   IVPB 400 milliGRAM(s) IV Intermittent every 12 hours  dextrose 5% + lactated ringers. 1000 milliLiter(s) IV Continuous <Continuous>  insulin lispro (HumaLOG) corrective regimen sliding scale   SubCutaneous every 6 hours  methylPREDNISolone sodium succinate Injectable 20 milliGRAM(s) IV Push every 8 hours  metroNIDAZOLE  IVPB 500 milliGRAM(s) IV Intermittent every 8 hours  montelukast 10 milliGRAM(s) Oral daily  pantoprazole  Injectable 40 milliGRAM(s) IV Push daily  phenylephrine    Infusion 0.1 MICROgram(s)/kG/Min IV Continuous <Continuous>      PMHX/PSHX:  Vertigo  Atrial flutter  Diverticulitis  Cardiomyopathy  Kidney stone  COPD (chronic obstructive pulmonary disease)  Cardiac Pacemaker  HTN - Hypertension  Gout  Diabetes  Congestive Heart Failure  Asthma  S/P cholecystectomy  AICD (Automatic Cardioverter/Defibrillator) Present  S/P Cholecystectomy      Family history:  Family history of brain tumor  No pertinent family history in first degree relatives      Social History:     ROS:   General:  No fevers, chills or night sweats.  ENT:  No sore throat or dysphagia  CV:  No pain or palpitations  Resp:  No dyspnea, cough, wheezing  GI:  No pain, No nausea, No vomiting, No diarrhea, No constipation, No weight loss, No pruritis, No rectal bleeding, No tarry stools  Skin:  No rash or edema      PHYSICAL EXAM:   GENERAL:  NAD, Appears stated age  HEENT:  NC/AT,  conjunctivae clear and pink, sclera -anicteric  CHEST:  CTA B/L, Normal effort  HEART:  RRR S1/S2, No murmurs  ABDOMEN:  Soft, non-tender, non-distended, normoactive bowel sounds,  no masses ,no hepato-splenomegaly, no signs of chronic liver disease  EXTEREMITIES:  No cyanosis or Edema  SKIN:  Warm & Dry. No rash or erythema  NEURO:  Alert, oriented    Vital Signs:  Vital Signs Last 24 Hrs  T(C): 36.7 (2019 04:00), Max: 38.2 (2019 21:00)  T(F): 98 (2019 04:00), Max: 100.8 (2019 21:00)  HR: 108 (2019 12:15) (73 - 161)  BP: 83/50 (2019 12:15) (76/50 - 164/84)  BP(mean): 61 (2019 12:15) (57 - 104)  RR: 27 (2019 12:15) (17 - 36)  SpO2: 95% (2019 12:15) (95% - 100%)  Daily Height in cm: 160.02 (2019 21:00)    Daily     LABS:                        13.8   5.0   )-----------( 217      ( 2019 00:29 )             42.8     Mean Cell Volume: 96.4 fl (- @ 00:29)        136  |  98  |  38<H>  ----------------------------<  212<H>  4.6   |  17<L>  |  2.20<H>    Ca    9.3      2019 00:29  Phos  4.3       Mg     1.7         TPro  7.1  /  Alb  3.6  /  TBili  0.9  /  DBili  x   /  AST  18  /  ALT  14  /  AlkPhos  75  07-01    LIVER FUNCTIONS - ( 2019 00:29 )  Alb: 3.6 g/dL / Pro: 7.1 g/dL / ALK PHOS: 75 U/L / ALT: 14 U/L / AST: 18 U/L / GGT: x           PT/INR - ( 2019 00:29 )   PT: 17.8 sec;   INR: 1.54 ratio           Urinalysis Basic - ( 2019 15:38 )    Color: Light Yellow / Appearance: Clear / S.013 / pH: x  Gluc: x / Ketone: Negative  / Bili: Negative / Urobili: Negative   Blood: x / Protein: Negative / Nitrite: Negative   Leuk Esterase: Large / RBC: 3 /hpf / WBC 10 /HPF   Sq Epi: x / Non Sq Epi: 3 /hpf / Bacteria: Few      Amylase Serum--      Lipase serum11       Ammonia--                          13.8   5.0   )-----------( 217      ( 2019 00:29 )             42.8                         14.4   3.9   )-----------( 248      ( 2019 18:00 )             41.3                         11.7   8.4   )-----------( 228      ( 2019 14:45 )             34.1     Imaging: Chief Complaint:  Patient is a 74y old  Female who presents with a chief complaint of abdominal pain    HPI:  74 year old female with hx of HFrEF (mod-severe dysfunction) s/p AICD, asthma, Hypertension, hyperlipidemia and Afib presents from home with severe left sided abdominal pain. She descrbes the pain as initially left sided, but now reports the pain is "all over". She denies fevers, chills, vomiting, diarrhea, constipation, melena, hematochezia, hematemesis, cough and history of similar pain. Course complicated by acute hypoxic respiratory failure thought secondary to CHF exacerbation vs bronchospasm requiring MICU stay (requiring BIPAP, No intubation). CT on admission for numerous diverticula are noted in the descending colon with adjacent free fluid and hazy changes concerning for diverticulitis.     Allergies:  Coreg (Other)  digoxin (Other; Short breath (Mild to Mod))  Lopressor (Short breath)  metoprolol (Other)  penicillins (Hives)  Solu-Medrol (Other (Mild to Mod))    Home Medications:  Reviewed    Hospital Medications:  ALBUTerol/ipratropium for Nebulization 3 milliLiter(s) Nebulizer every 6 hours PRN  ALBUTerol/ipratropium for Nebulization 3 milliLiter(s) Nebulizer every 4 hours  apixaban 5 milliGRAM(s) Oral every 12 hours  aspirin enteric coated 81 milliGRAM(s) Oral daily  atorvastatin 20 milliGRAM(s) Oral at bedtime  chlorhexidine 4% Liquid 1 Application(s) Topical <User Schedule>  ciprofloxacin   IVPB 400 milliGRAM(s) IV Intermittent every 12 hours  dextrose 5% + lactated ringers. 1000 milliLiter(s) IV Continuous <Continuous>  insulin lispro (HumaLOG) corrective regimen sliding scale   SubCutaneous every 6 hours  methylPREDNISolone sodium succinate Injectable 20 milliGRAM(s) IV Push every 8 hours  metroNIDAZOLE  IVPB 500 milliGRAM(s) IV Intermittent every 8 hours  montelukast 10 milliGRAM(s) Oral daily  pantoprazole  Injectable 40 milliGRAM(s) IV Push daily  phenylephrine    Infusion 0.1 MICROgram(s)/kG/Min IV Continuous <Continuous>    PMHX/PSHX:  Vertigo  Atrial flutter  Diverticulitis  Cardiomyopathy  Kidney stone  COPD (chronic obstructive pulmonary disease)  Cardiac Pacemaker  HTN - Hypertension  Gout  Diabetes  Congestive Heart Failure  Asthma  S/P cholecystectomy  AICD (Automatic Cardioverter/Defibrillator) Present  S/P Cholecystectomy    Family history:  Family history of brain tumor  No pertinent family history in first degree relatives    Social History:   No history of tobacco use, EtOH use or illicit drug use     ROS:   General:  No fevers, chills or night sweats.  ENT:  No sore throat or dysphagia  CV:  No pain or palpitations  Resp:  No dyspnea, cough, wheezing  GI:  + pain, + nausea, No vomiting, No diarrhea,  No rectal bleeding, No tarry stools  Skin:  No rash or edema    PHYSICAL EXAM:   GENERAL:  NAD, Appears stated age  HEENT:  NC/AT,  conjunctivae clear and pink, sclera -anicteric  CHEST:  CTA B/L, Normal effort  HEART:  RRR S1/S2, No murmurs  ABDOMEN:  Soft, diffusely tender, non-distended, BS+  EXTEREMITIES:  No cyanosis  SKIN:  Warm & Dry.  NEURO:  Alert, oriented    Vital Signs:  Vital Signs Last 24 Hrs  T(C): 36.7 (2019 04:00), Max: 38.2 (2019 21:00)  T(F): 98 (2019 04:00), Max: 100.8 (2019 21:00)  HR: 108 (2019 12:15) (73 - 161)  BP: 83/50 (2019 12:15) (76/50 - 164/84)  BP(mean): 61 (2019 12:15) (57 - 104)  RR: 27 (2019 12:15) (17 - 36)  SpO2: 95% (2019 12:15) (95% - 100%)  Daily Height in cm: 160.02 (2019 21:00)    Daily     LABS:                        13.8   5.0   )-----------( 217      ( 2019 00:29 )             42.8     Mean Cell Volume: 96.4 fl (-19 @ 00:29)        136  |  98  |  38<H>  ----------------------------<  212<H>  4.6   |  17<L>  |  2.20<H>    Ca    9.3      2019 00:29  Phos  4.3       Mg     1.7         TPro  7.1  /  Alb  3.6  /  TBili  0.9  /  DBili  x   /  AST  18  /  ALT  14  /  AlkPhos  75      LIVER FUNCTIONS - ( 2019 00:29 )  Alb: 3.6 g/dL / Pro: 7.1 g/dL / ALK PHOS: 75 U/L / ALT: 14 U/L / AST: 18 U/L / GGT: x           PT/INR - ( 2019 00:29 )   PT: 17.8 sec;   INR: 1.54 ratio      Urinalysis Basic - ( 2019 15:38 )    Color: Light Yellow / Appearance: Clear / S.013 / pH: x  Gluc: x / Ketone: Negative  / Bili: Negative / Urobili: Negative   Blood: x / Protein: Negative / Nitrite: Negative   Leuk Esterase: Large / RBC: 3 /hpf / WBC 10 /HPF   Sq Epi: x / Non Sq Epi: 3 /hpf / Bacteria: Few    Lipase serum11                           13.8   5.0   )-----------( 217      ( 2019 00:29 )             42.8                         14.4   3.9   )-----------( 248      ( 2019 18:00 )             41.3                         11.7   8.4   )-----------( 228      ( 2019 14:45 )             34.1     Imaging: Chief Complaint:  Patient is a 74y old  Female who presents with a chief complaint of abdominal pain    HPI:  74 year old female with hx of HFrEF (mod-severe dysfunction) s/p AICD, asthma, Hypertension, hyperlipidemia and Afib presents from home with severe left sided abdominal pain. She descrbes the pain as initially left sided, but now reports the pain is "all over". She denies fevers, chills, vomiting, diarrhea, constipation, melena, hematochezia, hematemesis, cough and history of similar pain. Course complicated by acute hypoxic respiratory failure thought secondary to CHF exacerbation vs bronchospasm requiring MICU stay (requiring BIPAP, No intubation). CT on admission for numerous diverticula are noted in the descending colon with adjacent free fluid and hazy changes concerning for diverticulitis. Patient is in severe pain now on exam, she is unable to provide additional information or tell author when her last colonoscopy was.    Allergies:  Coreg (Other)  digoxin (Other; Short breath (Mild to Mod))  Lopressor (Short breath)  metoprolol (Other)  penicillins (Hives)  Solu-Medrol (Other (Mild to Mod))    Home Medications:  Reviewed    Hospital Medications:  ALBUTerol/ipratropium for Nebulization 3 milliLiter(s) Nebulizer every 6 hours PRN  ALBUTerol/ipratropium for Nebulization 3 milliLiter(s) Nebulizer every 4 hours  apixaban 5 milliGRAM(s) Oral every 12 hours  aspirin enteric coated 81 milliGRAM(s) Oral daily  atorvastatin 20 milliGRAM(s) Oral at bedtime  chlorhexidine 4% Liquid 1 Application(s) Topical <User Schedule>  ciprofloxacin   IVPB 400 milliGRAM(s) IV Intermittent every 12 hours  dextrose 5% + lactated ringers. 1000 milliLiter(s) IV Continuous <Continuous>  insulin lispro (HumaLOG) corrective regimen sliding scale   SubCutaneous every 6 hours  methylPREDNISolone sodium succinate Injectable 20 milliGRAM(s) IV Push every 8 hours  metroNIDAZOLE  IVPB 500 milliGRAM(s) IV Intermittent every 8 hours  montelukast 10 milliGRAM(s) Oral daily  pantoprazole  Injectable 40 milliGRAM(s) IV Push daily  phenylephrine    Infusion 0.1 MICROgram(s)/kG/Min IV Continuous <Continuous>    PMHX/PSHX:  Vertigo  Atrial flutter  Diverticulitis  Cardiomyopathy  Kidney stone  COPD (chronic obstructive pulmonary disease)  Cardiac Pacemaker  HTN - Hypertension  Gout  Diabetes  Congestive Heart Failure  Asthma  S/P cholecystectomy  AICD (Automatic Cardioverter/Defibrillator) Present  S/P Cholecystectomy    Family history:  Family history of brain tumor  No pertinent family history in first degree relatives    Social History:   No history of tobacco use, EtOH use or illicit drug use     ROS:   General:  No fevers, chills or night sweats.  ENT:  No sore throat or dysphagia  CV:  No pain or palpitations  Resp:  No dyspnea, cough, wheezing  GI:  + pain, + nausea, No vomiting, No diarrhea,  No rectal bleeding, No tarry stools  Skin:  No rash or edema  ROS limited as patient is in severe pain    PHYSICAL EXAM:   GENERAL:  Appears in pain, Appears stated age  HEENT:  NC/AT,  conjunctivae clear and pink, sclera -anicteric  CHEST:  CTA B/L, Normal effort  HEART:  RRR S1/S2, No murmurs  ABDOMEN:  Soft, diffusely tender, non-distended, BS+, no rebound/guarding  EXTEREMITIES:  No cyanosis  SKIN:  Warm & Dry.  NEURO:  Alert, oriented x 3    Vital Signs:  Vital Signs Last 24 Hrs  T(C): 36.7 (2019 04:00), Max: 38.2 (2019 21:00)  T(F): 98 (2019 04:00), Max: 100.8 (2019 21:00)  HR: 108 (2019 12:15) (73 - 161)  BP: 83/50 (2019 12:15) (76/50 - 164/84)  BP(mean): 61 (2019 12:15) (57 - 104)  RR: 27 (2019 12:15) (17 - 36)  SpO2: 95% (2019 12:15) (95% - 100%)  Daily Height in cm: 160.02 (2019 21:00)    Daily     LABS:                        13.8   5.0   )-----------( 217      ( 2019 00:29 )             42.8     Mean Cell Volume: 96.4 fl (- @ 00:29)        136  |  98  |  38<H>  ----------------------------<  212<H>  4.6   |  17<L>  |  2.20<H>    Ca    9.3      2019 00:29  Phos  4.3     07-  Mg     1.7         TPro  7.1  /  Alb  3.6  /  TBili  0.9  /  DBili  x   /  AST  18  /  ALT  14  /  AlkPhos  75  07    LIVER FUNCTIONS - ( 2019 00:29 )  Alb: 3.6 g/dL / Pro: 7.1 g/dL / ALK PHOS: 75 U/L / ALT: 14 U/L / AST: 18 U/L / GGT: x           PT/INR - ( 2019 00:29 )   PT: 17.8 sec;   INR: 1.54 ratio      Urinalysis Basic - ( 2019 15:38 )    Color: Light Yellow / Appearance: Clear / S.013 / pH: x  Gluc: x / Ketone: Negative  / Bili: Negative / Urobili: Negative   Blood: x / Protein: Negative / Nitrite: Negative   Leuk Esterase: Large / RBC: 3 /hpf / WBC 10 /HPF   Sq Epi: x / Non Sq Epi: 3 /hpf / Bacteria: Few    Lipase serum11                           13.8   5.0   )-----------( 217      ( 2019 00:29 )             42.8                         14.4   3.9   )-----------( 248      ( 2019 18:00 )             41.3                         11.7   8.4   )-----------( 228      ( 2019 14:45 )             34.1     Imaging:    < from: CT Abdomen and Pelvis No Cont (19 @ 14:57) >  IMPRESSION:     Acute diverticulitis at the splenic flexure with evidence of contained   perforation.     No drainable regional collection.    Increased ascites in the deep pelvis.    Retention of contrast within the kidneys from CT 1 day biliary suggestive   of acute tubular necrosis.    < end of copied text >

## 2019-07-01 NOTE — PROGRESS NOTE ADULT - ATTENDING COMMENTS
Pt seen and examined. 74 F with HFrEF, A fib on Eliquis, asthma, admitted to MICU with acute hypoxic resp failure requiring Bilevel support, septic shock 2/2 diverticulitis, now with acutely worsening abdominal pain with guarding, repeat Ct abd/pelvis showing a contained perforation. Surgery and GI evalv. Hold Eliquis. Keep NPO with gentle IV hydration with D5LR. Cont ABx coverage with Cipro/ Flagyl, FUP Cxs. Pressor support to keep MAP > 65. Extremely guarded prognosis. Pt seen and examined. 74 F with HFrEF, A fib on Eliquis, asthma, admitted to MICU with acute hypoxic resp failure ( ? asthma exac vs allergic reaction to contrast vs fluid overload) requiring Bilevel support, septic shock 2/2 diverticulitis, DIANNA,  now with acutely worsening abdominal pain with guarding, repeat Ct abd/pelvis showing a contained perforation. Surgery and GI evalv. Hold Eliquis. Keep NPO with gentle IV hydration with D5LR. Cont ABx coverage with Cipro/ Flagyl, FUP Cxs. Pressor support to keep MAP > 65. Extremely guarded prognosis.

## 2019-07-01 NOTE — PROGRESS NOTE ADULT - ASSESSMENT
74 F with PMHx of HFrEF s/p AICD, asthma, HTN, HLD, atrial fibrillation on eliquis p/w abdominal pain, found to have diverticulitis. Now transferred to the MICU for hypoxic respiratory failure following IV contrast administration.    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 Most likely in setting of intravascular depletion, lower suspicion for septic shock or cardiogenic shock  - started on phenylephrine gtt  Afib/Aflutter (on Eliquis)  - c/w home Eliquis  - currently rate controlled    #Resp  Hypoxic respiratory failure 2/2 bronchospasm IV contrast reaction vs cardiac asthma  - Acute onset of dyspnea and wheezing following IV contrast administration. Received Lasix, steroids, and bronchodilators in the ED. Likely bronchospasm, lower suspicion for pulmonary edema as pt does not appear fluid overloaded on exam.  - Currently on BiPAP and satting well   - Wean as tolerated  - c/w Duonebs PRN  - c/w Solumedrol 20mg q8h IVP    #GI   Diverticulitis   - c/w Cipro/Flagyl  - NPO while on BiPAP  - c/w home pantoprazole    #Renal  DIANNA on CKD   - DDx includes prerenal 2/2 intravascular depletion vs cardiorenal  - Cr 1.4 on admission (BL 1.1); now increased this AM to 2.2  - f/u urine electrolytes for FeUrea  - holding home ACEI    #ID   Diverticulitis  - c/w Cipro/Flagyl  - Also noted to have UA LE+; covering with cipro  - Afebrile, no leukocytosis  - f/u BCx, UCx    #Endo  T2DM on home insulin  - ISS  - FS q6h while NPO  - c/w home Lantus 12u qhs     #Prophylaxis  DVT: On Eliquis  Code Status: Full Code

## 2019-07-01 NOTE — CONSULT NOTE ADULT - SUBJECTIVE AND OBJECTIVE BOX
CC: 74y old Female admitted with a chief complaint of Diverticulitis, now concern for worsening abdominal pain    HPI:  74 F with PMHx of HFrEF s/p AICD, asthma, HTN, HLD, atrial fibrillation on eliquis p/w severe, sudden onset L sided abd pain that started three days ago. She reports nausea but no vomiting. She denies any fevers, chills, diarrhea or hematochezia. Patient denies having abdominal pain like this in the past.     Upon arrival to ED, patient with stable vitals. She received 4mg of IV morphine for pain. She subsequently went for CT of abdomen/pelvis with IV contrast to evaluate for cause of abdominal pain. After CT, she received additional morphine for pain. Shortly after that, patient started to have palpitations and shortness of breath. She started to have wheezing and became hypoxic. She received 80mg total of IV lasix, 125 of solumedrol, duonebs x2 and narcan. Patient admitted to MICU for management of respiratory and heart failure.     Patient now having worsening abdominal pain, repeat CT scan obtained demonstrating acute diverticulitis at splenic flexure with contained perforation. Patient currently on nasal cannula, remains tachycardiac, on phenylephrine 1.7mcg/kg/min.     PMHx: Vertigo  Atrial flutter  Diverticulitis  Cardiomyopathy  Kidney stone  COPD (chronic obstructive pulmonary disease)  Cardiac Pacemaker  HTN - Hypertension  Gout  Diabetes  Congestive Heart Failure  Asthma    PSHx: S/P cholecystectomy  AICD (Automatic Cardioverter/Defibrillator) Present  S/P Cholecystectomy    Medications (inpatient): ALBUTerol/ipratropium for Nebulization 3 milliLiter(s) Nebulizer every 4 hours  aspirin enteric coated 81 milliGRAM(s) Oral daily  atorvastatin 20 milliGRAM(s) Oral at bedtime  chlorhexidine 4% Liquid 1 Application(s) Topical <User Schedule>  ciprofloxacin   IVPB 400 milliGRAM(s) IV Intermittent every 12 hours  dextrose 5% + lactated ringers. 1000 milliLiter(s) IV Continuous <Continuous>  insulin lispro (HumaLOG) corrective regimen sliding scale   SubCutaneous every 6 hours  methylPREDNISolone sodium succinate Injectable 20 milliGRAM(s) IV Push every 8 hours  metroNIDAZOLE  IVPB 500 milliGRAM(s) IV Intermittent every 8 hours  montelukast 10 milliGRAM(s) Oral daily  pantoprazole  Injectable 40 milliGRAM(s) IV Push daily  phenylephrine    Infusion 0.1 MICROgram(s)/kG/Min IV Continuous <Continuous>    Medications (PRN):ALBUTerol/ipratropium for Nebulization 3 milliLiter(s) Nebulizer every 6 hours PRN  sodium chloride 0.9% lock flush 10 milliLiter(s) IV Push every 1 hour PRN    Allergies: Coreg (Other)  digoxin (Other; Short breath (Mild to Mod))  Lopressor (Short breath)  penicillins (Hives)  (Intolerances: metoprolol (Other)  Solu-Medrol (Other (Mild to Mod))  )  Social Hx:   Family Hx: Family history of brain tumor      Physical Exam  T(C): 37.3  HR: 112 (96 - 121)  BP: 104/66 (76/50 - 133/90)  RR: 28 (17 - 36)  SpO2: 97% (94% - 100%)  Tmax: T(C): , Max: 38.2 (19 @ 21:00)    19  --------------------------------------------------------  IN:    IV PiggyBack: 1000 mL    phenylephrine   Infusion: 100 mL  Total IN: 1100 mL    OUT:    Intermittent Catheterization - Urethral: 300 mL    Voided: 200 mL  Total OUT: 500 mL    Total NET: 600 mL      19  --------------------------------------------------------  IN:    dextrose 5% + lactated ringers.: 600 mL    IV PiggyBack: 400 mL    Lactated Ringers IV Bolus: 500 mL    Oral Fluid: 50 mL    phenylephrine   Infusion: 602 mL  Total IN: 2152 mL    OUT:    Ureteral Catheter: 275 mL  Total OUT: 275 mL    Total NET: 1877 mL        General: well developed, well nourished, NAD  Neuro: alert and oriented, no focal deficits, moves all extremities spontaneously  HEENT: NCAT, EOMI, anicteric, mucosa moist  Respiratory: airway patent, respirations unlabored  CVS: regular rate and rhythm  Abdomen: soft, nontender, nondistended  Extremities: no edema, sensation and movement grossly intact  Skin: warm, dry, appropriate color    Labs:                        13.8   5.0   )-----------( 217      ( 2019 00:29 )             42.8     PT/INR - ( 2019 00:29 )   PT: 17.8 sec;   INR: 1.54 ratio               136  |  98  |  38<H>  ----------------------------<  212<H>  4.6   |  17<L>  |  2.20<H>    Ca    9.3      2019 00:29  Phos  4.3       Mg     1.7         TPro  7.1  /  Alb  3.6  /  TBili  0.9  /  DBili  x   /  AST  18  /  ALT  14  /  AlkPhos  75  07    Urinalysis Basic - ( 2019 15:38 )    Color: Light Yellow / Appearance: Clear / S.013 / pH: x  Gluc: x / Ketone: Negative  / Bili: Negative / Urobili: Negative   Blood: x / Protein: Negative / Nitrite: Negative   Leuk Esterase: Large / RBC: 3 /hpf / WBC 10 /HPF   Sq Epi: x / Non Sq Epi: 3 /hpf / Bacteria: Few      ABG - ( 2019 05:42 )  pH, Arterial: 7.33  pH, Blood: x     /  pCO2: 38    /  pO2: 174   / HCO3: 19    / Base Excess: -5.8  /  SaO2: 98                    Imaging and other studies: CC: 74y old Female admitted with a chief complaint of Diverticulitis, now concern for worsening abdominal pain    HPI:  74 F with PMHx of HFrEF s/p AICD, asthma, HTN, HLD, atrial fibrillation on eliquis p/w severe, sudden onset L sided abd pain that started three days ago. She reports nausea but no vomiting. She denies any fevers, chills, diarrhea or hematochezia. Patient denies having abdominal pain like this in the past.     Upon arrival to ED, patient with stable vitals. She received 4mg of IV morphine for pain. She subsequently went for CT of abdomen/pelvis with IV contrast to evaluate for cause of abdominal pain. After CT, she received additional morphine for pain. Shortly after that, patient started to have palpitations and shortness of breath. She started to have wheezing and became hypoxic. She received 80mg total of IV lasix, 125 of solumedrol, duonebs x2 and narcan. Patient admitted to MICU for management of respiratory and heart failure.     Patient now having worsening abdominal pain, repeat CT scan obtained demonstrating acute diverticulitis at splenic flexure with contained perforation. Patient currently on nasal cannula, remains tachycardiac, on phenylephrine 1.7mcg/kg/min.     PMHx: Vertigo  Atrial flutter  Diverticulitis  Cardiomyopathy  Kidney stone  COPD (chronic obstructive pulmonary disease)  Cardiac Pacemaker  HTN - Hypertension  Gout  Diabetes  Congestive Heart Failure  Asthma    PSHx: S/P cholecystectomy  AICD (Automatic Cardioverter/Defibrillator) Present  S/P Cholecystectomy    Medications (inpatient): ALBUTerol/ipratropium for Nebulization 3 milliLiter(s) Nebulizer every 4 hours  aspirin enteric coated 81 milliGRAM(s) Oral daily  atorvastatin 20 milliGRAM(s) Oral at bedtime  chlorhexidine 4% Liquid 1 Application(s) Topical <User Schedule>  ciprofloxacin   IVPB 400 milliGRAM(s) IV Intermittent every 12 hours  dextrose 5% + lactated ringers. 1000 milliLiter(s) IV Continuous <Continuous>  insulin lispro (HumaLOG) corrective regimen sliding scale   SubCutaneous every 6 hours  methylPREDNISolone sodium succinate Injectable 20 milliGRAM(s) IV Push every 8 hours  metroNIDAZOLE  IVPB 500 milliGRAM(s) IV Intermittent every 8 hours  montelukast 10 milliGRAM(s) Oral daily  pantoprazole  Injectable 40 milliGRAM(s) IV Push daily  phenylephrine    Infusion 0.1 MICROgram(s)/kG/Min IV Continuous <Continuous>    Medications (PRN):ALBUTerol/ipratropium for Nebulization 3 milliLiter(s) Nebulizer every 6 hours PRN  sodium chloride 0.9% lock flush 10 milliLiter(s) IV Push every 1 hour PRN    Allergies: Coreg (Other)  digoxin (Other; Short breath (Mild to Mod))  Lopressor (Short breath)  penicillins (Hives)  (Intolerances: metoprolol (Other)  Solu-Medrol (Other (Mild to Mod))  )  Social Hx:   Family Hx: Family history of brain tumor      Physical Exam  T(C): 37.3  HR: 112 (96 - 121)  BP: 104/66 (76/50 - 133/90)  RR: 28 (17 - 36)  SpO2: 97% (94% - 100%)  Tmax: T(C): , Max: 38.2 (19 @ 21:00)    19  --------------------------------------------------------  IN:    IV PiggyBack: 1000 mL    phenylephrine   Infusion: 100 mL  Total IN: 1100 mL    OUT:    Intermittent Catheterization - Urethral: 300 mL    Voided: 200 mL  Total OUT: 500 mL    Total NET: 600 mL      19  --------------------------------------------------------  IN:    dextrose 5% + lactated ringers.: 600 mL    IV PiggyBack: 400 mL    Lactated Ringers IV Bolus: 500 mL    Oral Fluid: 50 mL    phenylephrine   Infusion: 602 mL  Total IN: 2152 mL    OUT:    Ureteral Catheter: 275 mL  Total OUT: 275 mL    Total NET: 1877 mL        General: well developed, well nourished, NAD  Neuro: alert and oriented, no focal deficits, moves all extremities spontaneously  HEENT: NCAT, EOMI, anicteric, mucosa moist  Respiratory: airway patent, tachypneic   CVS: regular rate and rhythm  Abdomen: soft, diffusely TTP worse in LUQ and LLQ, nondistended  Extremities: no edema, sensation and movement grossly intact  Skin: warm, dry, appropriate color    Labs:                        13.8   5.0   )-----------( 217      ( 2019 00:29 )             42.8     PT/INR - ( 2019 00:29 )   PT: 17.8 sec;   INR: 1.54 ratio               136  |  98  |  38<H>  ----------------------------<  212<H>  4.6   |  17<L>  |  2.20<H>    Ca    9.3      2019 00:29  Phos  4.3       Mg     1.7         TPro  7.1  /  Alb  3.6  /  TBili  0.9  /  DBili  x   /  AST  18  /  ALT  14  /  AlkPhos  75      Urinalysis Basic - ( 2019 15:38 )    Color: Light Yellow / Appearance: Clear / S.013 / pH: x  Gluc: x / Ketone: Negative  / Bili: Negative / Urobili: Negative   Blood: x / Protein: Negative / Nitrite: Negative   Leuk Esterase: Large / RBC: 3 /hpf / WBC 10 /HPF   Sq Epi: x / Non Sq Epi: 3 /hpf / Bacteria: Few      ABG - ( 2019 05:42 )  pH, Arterial: 7.33  pH, Blood: x     /  pCO2: 38    /  pO2: 174   / HCO3: 19    / Base Excess: -5.8  /  SaO2: 98                    Imaging and other studies:  < from: CT Abdomen and Pelvis No Cont (19 @ 14:57) >    COMPARISON: CT 2019    PROCEDURE:   CT of the Abdomen and Pelvis was performed without intravenous contrast.   Intravenous contrast: None.  Oral contrast: None.  Sagittal and coronal reformats were performed.    FINDINGS:    LOWER CHEST: Mild bronchiectasis with subsegmental atelectasis in the   right middle lobe. Partially visualized iliac CT wires.    LIVER: Within normal limits.  BILE DUCTS: Normal caliber.  GALLBLADDER: Within normal limits.  SPLEEN: Within normal limits.  PANCREAS: Within normal limits.  ADRENALS: Within normal limits.  KIDNEYS/URETERS:Retention of contrast within the kidneys from prior CT 1   day earlier suggestive of acute tubular necrosis.    BLADDER: Collapsed or on a Ching catheter.  REPRODUCTIVE ORGANS: Small calcified fibroids.    BOWEL/PERITONEUM: No bowel obstruction. Diffuse colonic diverticulosis   with inflammation at the level of the splenic flexure. A few extraluminal   droplets of air adjacent to the inflamed colon as well as small fluid   without well-defined collection. Small ascites in the deep pelvis with   interval increase from prior imaging one day earlier.  VESSELS:  Atherosclerotic changes.  RETROPERITONEUM: No lymphadenopathy.    ABDOMINAL WALL: Within normal limits.  BONES: Degenerative changes.    IMPRESSION:     Acute diverticulitis at the splenic flexure with evidence of contained   perforation.     No drainable regional collection.    Increased ascites in the deep pelvis.    Retention of contrast within the kidneys from CT 1 day biliary suggestive   of acute tubular necrosis.

## 2019-07-01 NOTE — CONSULT NOTE ADULT - ASSESSMENT
73 y/o with PMHx of HFrEF s/p AICD, asthma, HTN, HLD, atrial fibrillation on eliquis presenting with abd. pain found to have acute diverticulitis now with worsening abd. pain and CT scan demonstrating new contained perforation     - d/c Eliquis   - NPO  - IVF  - wean pressors as tolerated   - wean steroids   - transfer to SICU  -admit to Green Team      Patient seen and examined with senior resident plan discussed with Dr. Christiano [facility]   [phone]

## 2019-07-01 NOTE — CONSULT NOTE ADULT - SUBJECTIVE AND OBJECTIVE BOX
SICU CONSULT NOTE    HISTORY OF PRESENT ILLNESS:  74 F with PMHx of HFrEF s/p AICD, asthma, HTN, HLD, atrial fibrillation on eliquis p/w severe, sudden onset L sided abd pain which started yesterday morning after eating. She endorsed nausea at the time but no vomiting. She did have any fevers, chills, diarrhea or hematochezia.      CTAP w/ IV contrast showed proximal descending colon/splenic flexure diverticulitis with small amount of free fluid and 3cm low attenuation structure in the right adnexa ?fluid collection ?ovarian cyst. She developed dyspnea and wheezing after the CT scan and became hypoxic. Bedside US int he ED demonestrated a few B lines. She received 80mg total of IV lasix, 125 of solumedrol, duonebs x2 and narcan and she was admitted to MICU for management of respiratory and heart failure.     Overnight through this morning she was persistently tachycardic with BPs as low as 76/50, MAPs in low 60s. She was bolused with 500cc of crystalloid without improvement in blood pressure. She was then started on phenylephrine. Her lactate was uptrending from 2 to 2.6 to 3 on the arterial blood gas.     PAST MEDICAL HISTORY: Vertigo  Atrial flutter  Diverticulitis  Cardiomyopathy  Kidney stone  COPD (chronic obstructive pulmonary disease)  Cardiac Pacemaker  HTN - Hypertension  Gout  Diabetes  Congestive Heart Failure  Asthma      PAST SURGICAL HISTORY: S/P cholecystectomy  AICD (Automatic Cardioverter/Defibrillator) Present  S/P Cholecystectomy      FAMILY HISTORY: Family history of brain tumor  No pertinent family history in first degree relatives      SOCIAL HISTORY:    CODE STATUS:     HOME MEDICATIONS:    ALLERGIES: Coreg (Other)  digoxin (Other; Short breath (Mild to Mod))  Lopressor (Short breath)  metoprolol (Other)  penicillins (Hives)  Solu-Medrol (Other (Mild to Mod))      VITAL SIGNS:  ICU Vital Signs Last 24 Hrs  T(C): 36.9 (01 Jul 2019 21:13), Max: 37.7 (01 Jul 2019 00:00)  T(F): 98.5 (01 Jul 2019 21:13), Max: 99.8 (01 Jul 2019 00:00)  HR: 116 (01 Jul 2019 21:15) (96 - 116)  BP: 117/62 (01 Jul 2019 21:15) (76/50 - 126/70)  BP(mean): 84 (01 Jul 2019 21:15) (57 - 87)  ABP: --  ABP(mean): --  RR: 32 (01 Jul 2019 21:15) (17 - 35)  SpO2: 97% (01 Jul 2019 21:15) (94% - 99%)      NEURO  Exam:      RESPIRATORY  Mechanical Ventilation:   ABG - ( 01 Jul 2019 05:42 )  pH: 7.33  /  pCO2: 38    /  pO2: 174   / HCO3: 19    / Base Excess: -5.8  /  SaO2: 98      Lactate: x                Exam:  ALBUTerol/ipratropium for Nebulization 3 milliLiter(s) Nebulizer every 6 hours PRN Shortness of Breath and/or Wheezing  ALBUTerol/ipratropium for Nebulization 3 milliLiter(s) Nebulizer every 4 hours  montelukast 10 milliGRAM(s) Oral daily      CARDIOVASCULAR  VBG - ( 01 Jul 2019 16:57 )  pH: 7.23  /  pCO2: 52    /  pO2: 43    / HCO3: 21    / Base Excess: -6.2  /  SaO2: 70     Lactate: 4.3              Exam:  Cardiac Rhythm:  phenylephrine    Infusion 0.1 MICROgram(s)/kG/Min IV Continuous <Continuous>      GI/NUTRITION  Exam:  Diet:  pantoprazole  Injectable 40 milliGRAM(s) IV Push daily      GENITOURINARY/RENAL  dextrose 5% + lactated ringers. 1000 milliLiter(s) IV Continuous <Continuous>  sodium chloride 0.9% lock flush 10 milliLiter(s) IV Push every 1 hour PRN Pre/post blood products, medications, blood draw, and to maintain line patency      06-30 @ 07:01 - 07-01 @ 07:00  --------------------------------------------------------  IN:    IV PiggyBack: 1000 mL    phenylephrine   Infusion: 100 mL  Total IN: 1100 mL    OUT:    Intermittent Catheterization - Urethral: 300 mL    Voided: 200 mL  Total OUT: 500 mL    Total NET: 600 mL      07-01 @ 07:01 - 07-01 @ 21:31  --------------------------------------------------------  IN:    dextrose 5% + lactated ringers.: 600 mL    IV PiggyBack: 400 mL    Lactated Ringers IV Bolus: 500 mL    Oral Fluid: 50 mL    phenylephrine   Infusion: 602 mL  Total IN: 2152 mL    OUT:    Ureteral Catheter: 275 mL  Total OUT: 275 mL    Total NET: 1877 mL          07-01    136  |  98  |  38<H>  ----------------------------<  212<H>  4.6   |  17<L>  |  2.20<H>    Ca    9.3      01 Jul 2019 00:29  Phos  4.3     07-01  Mg     1.7     07-01    TPro  7.1  /  Alb  3.6  /  TBili  0.9  /  DBili  x   /  AST  18  /  ALT  14  /  AlkPhos  75  07-01    [ ] Ching catheter, indication: urine output monitoring in critically ill patient    HEMATOLOGIC  [ ] VTE Prophylaxis:  aspirin enteric coated 81 milliGRAM(s) Oral daily                          13.8   5.0   )-----------( 217      ( 01 Jul 2019 00:29 )             42.8     PT/INR - ( 01 Jul 2019 00:29 )   PT: 17.8 sec;   INR: 1.54 ratio           Transfusion: [ ] PRBC	[ ] Platelets	[ ] FFP	[ ] Cryoprecipitate      INFECTIOUS DISEASES  ciprofloxacin   IVPB 400 milliGRAM(s) IV Intermittent every 12 hours  metroNIDAZOLE  IVPB 500 milliGRAM(s) IV Intermittent every 8 hours    RECENT CULTURES:      ENDOCRINE  atorvastatin 20 milliGRAM(s) Oral at bedtime  insulin lispro (HumaLOG) corrective regimen sliding scale   SubCutaneous every 6 hours  methylPREDNISolone sodium succinate Injectable 20 milliGRAM(s) IV Push every 8 hours    CAPILLARY BLOOD GLUCOSE      POCT Blood Glucose.: 205 mg/dL (01 Jul 2019 17:16)  POCT Blood Glucose.: 202 mg/dL (01 Jul 2019 12:43)  POCT Blood Glucose.: 191 mg/dL (01 Jul 2019 06:01)  POCT Blood Glucose.: 184 mg/dL (30 Jun 2019 23:32)      PATIENT CARE ACCESS DEVICES:  [ ] Peripheral IV  [ ] Central Venous Line	[ ] R	[ ] L	[ ] IJ	[ ] Fem	[ ] SC	Placed:   [ ] Arterial Line		[ ] R	[ ] L	[ ] Fem	[ ] Rad	[ ] Ax	Placed:   [ ] PICC:					[ ] Mediport  [ ] Urinary Catheter, Date Placed:   [x] Necessity of urinary, arterial, and venous catheters discussed    OTHER MEDICATIONS: chlorhexidine 4% Liquid 1 Application(s) Topical <User Schedule> SICU CONSULT NOTE    HISTORY OF PRESENT ILLNESS:  74 F with PMHx of HFrEF s/p AICD, asthma, HTN, HLD, atrial fibrillation on eliquis p/w severe, sudden onset L sided abd pain which started yesterday morning after eating. She endorsed nausea at the time but no vomiting. She did have any fevers, chills, diarrhea or hematochezia.      CTAP w/ IV contrast showed proximal descending colon/splenic flexure diverticulitis with small amount of free fluid and 3cm low attenuation structure in the right adnexa ?fluid collection ?ovarian cyst. She developed dyspnea and wheezing after the CT scan and became hypoxic. Bedside US int he ED demonestrated a few B lines. She received 80mg total of IV lasix, 125 of solumedrol, duonebs x2 and narcan and she was admitted to MICU for management of respiratory and heart failure.     Overnight through this morning she was persistently tachycardic with BPs as low as 76/50, MAPs in low 60s. She was bolused with 500cc of crystalloid without improvement in blood pressure. She was then started on phenylephrine. Her lactate was uptrending from 2 to 2.6 to 3 on the arterial blood gas. A repeat CT scan showed  She was transferred to the SICU for further HD monitoring. Antibiotics were switched to Aztreonam and flagyl.    PAST MEDICAL HISTORY: Vertigo  Atrial flutter  Diverticulitis  Cardiomyopathy  Kidney stone  COPD (chronic obstructive pulmonary disease)  Cardiac Pacemaker  HTN - Hypertension  Gout  Diabetes  Congestive Heart Failure  Asthma      PAST SURGICAL HISTORY: S/P cholecystectomy  AICD (Automatic Cardioverter/Defibrillator) Present  S/P Cholecystectomy      FAMILY HISTORY: Family history of brain tumor  No pertinent family history in first degree relatives      SOCIAL HISTORY:    CODE STATUS:     HOME MEDICATIONS:    ALLERGIES: Coreg (Other)  digoxin (Other; Short breath (Mild to Mod))  Lopressor (Short breath)  metoprolol (Other)  penicillins (Hives)  Solu-Medrol (Other (Mild to Mod))      VITAL SIGNS:  ICU Vital Signs Last 24 Hrs  T(C): 36.9 (01 Jul 2019 21:13), Max: 37.7 (01 Jul 2019 00:00)  T(F): 98.5 (01 Jul 2019 21:13), Max: 99.8 (01 Jul 2019 00:00)  HR: 116 (01 Jul 2019 21:15) (96 - 116)  BP: 117/62 (01 Jul 2019 21:15) (76/50 - 126/70)  BP(mean): 84 (01 Jul 2019 21:15) (57 - 87)  ABP: --  ABP(mean): --  RR: 32 (01 Jul 2019 21:15) (17 - 35)  SpO2: 97% (01 Jul 2019 21:15) (94% - 99%)      NEURO  Exam:      RESPIRATORY  Mechanical Ventilation:   ABG - ( 01 Jul 2019 05:42 )  pH: 7.33  /  pCO2: 38    /  pO2: 174   / HCO3: 19    / Base Excess: -5.8  /  SaO2: 98      Lactate: x        Exam:  ALBUTerol/ipratropium for Nebulization 3 milliLiter(s) Nebulizer every 6 hours PRN Shortness of Breath and/or Wheezing  ALBUTerol/ipratropium for Nebulization 3 milliLiter(s) Nebulizer every 4 hours  montelukast 10 milliGRAM(s) Oral daily      CARDIOVASCULAR  VBG - ( 01 Jul 2019 16:57 )  pH: 7.23  /  pCO2: 52    /  pO2: 43    / HCO3: 21    / Base Excess: -6.2  /  SaO2: 70     Lactate: 4.3              Exam:  Cardiac Rhythm:  phenylephrine    Infusion 0.1 MICROgram(s)/kG/Min IV Continuous <Continuous>      GI/NUTRITION  Exam:  Diet:  pantoprazole  Injectable 40 milliGRAM(s) IV Push daily      GENITOURINARY/RENAL  dextrose 5% + lactated ringers. 1000 milliLiter(s) IV Continuous <Continuous>  sodium chloride 0.9% lock flush 10 milliLiter(s) IV Push every 1 hour PRN Pre/post blood products, medications, blood draw, and to maintain line patency      06-30 @ 07:01 - 07-01 @ 07:00  --------------------------------------------------------  IN:    IV PiggyBack: 1000 mL    phenylephrine   Infusion: 100 mL  Total IN: 1100 mL    OUT:    Intermittent Catheterization - Urethral: 300 mL    Voided: 200 mL  Total OUT: 500 mL    Total NET: 600 mL      07-01 @ 07:01 - 07-01 @ 21:31  --------------------------------------------------------  IN:    dextrose 5% + lactated ringers.: 600 mL    IV PiggyBack: 400 mL    Lactated Ringers IV Bolus: 500 mL    Oral Fluid: 50 mL    phenylephrine   Infusion: 602 mL  Total IN: 2152 mL    OUT:    Ureteral Catheter: 275 mL  Total OUT: 275 mL    Total NET: 1877 mL          07-01    136  |  98  |  38<H>  ----------------------------<  212<H>  4.6   |  17<L>  |  2.20<H>    Ca    9.3      01 Jul 2019 00:29  Phos  4.3     07-01  Mg     1.7     07-01    TPro  7.1  /  Alb  3.6  /  TBili  0.9  /  DBili  x   /  AST  18  /  ALT  14  /  AlkPhos  75  07-01    [ ] Ching catheter, indication: urine output monitoring in critically ill patient    HEMATOLOGIC  [ ] VTE Prophylaxis:  aspirin enteric coated 81 milliGRAM(s) Oral daily                          13.8   5.0   )-----------( 217      ( 01 Jul 2019 00:29 )             42.8     PT/INR - ( 01 Jul 2019 00:29 )   PT: 17.8 sec;   INR: 1.54 ratio           Transfusion: [ ] PRBC	[ ] Platelets	[ ] FFP	[ ] Cryoprecipitate      INFECTIOUS DISEASES  ciprofloxacin   IVPB 400 milliGRAM(s) IV Intermittent every 12 hours  metroNIDAZOLE  IVPB 500 milliGRAM(s) IV Intermittent every 8 hours    RECENT CULTURES:      ENDOCRINE  atorvastatin 20 milliGRAM(s) Oral at bedtime  insulin lispro (HumaLOG) corrective regimen sliding scale   SubCutaneous every 6 hours  methylPREDNISolone sodium succinate Injectable 20 milliGRAM(s) IV Push every 8 hours    CAPILLARY BLOOD GLUCOSE      POCT Blood Glucose.: 205 mg/dL (01 Jul 2019 17:16)  POCT Blood Glucose.: 202 mg/dL (01 Jul 2019 12:43)  POCT Blood Glucose.: 191 mg/dL (01 Jul 2019 06:01)  POCT Blood Glucose.: 184 mg/dL (30 Jun 2019 23:32)      PATIENT CARE ACCESS DEVICES:  [ ] Peripheral IV  [ ] Central Venous Line	[ ] R	[ ] L	[ ] IJ	[ ] Fem	[ ] SC	Placed:   [ ] Arterial Line		[ ] R	[ ] L	[ ] Fem	[ ] Rad	[ ] Ax	Placed:   [ ] PICC:					[ ] Mediport  [ ] Urinary Catheter, Date Placed:   [x] Necessity of urinary, arterial, and venous catheters discussed    OTHER MEDICATIONS: chlorhexidine 4% Liquid 1 Application(s) Topical <User Schedule> SICU CONSULT NOTE    HISTORY OF PRESENT ILLNESS:  74 F with PMHx of HFrEF s/p AICD, asthma, HTN, HLD, atrial fibrillation on eliquis p/w severe, sudden onset L sided abd pain which started yesterday morning after eating. She endorsed nausea at the time but no vomiting. She did have any fevers, chills, diarrhea or hematochezia.      CTAP w/ IV contrast showed proximal descending colon/splenic flexure diverticulitis with small amount of free fluid and 3cm low attenuation structure in the right adnexa ?fluid collection ?ovarian cyst. She developed dyspnea and wheezing after the CT scan and became hypoxic. Bedside US int he ED demonestrated a few B lines. She received 80mg total of IV lasix, 125 of solumedrol, duonebs x2 and narcan and she was admitted to MICU for management of respiratory and heart failure.     Overnight through this morning she was persistently tachycardic with BPs as low as 76/50, MAPs in low 60s. She was bolused with 500cc of crystalloid without improvement in blood pressure. She was then started on phenylephrine. Her lactate was uptrending from 2 to 2.6 to 3 on the arterial blood gas. A repeat CT scan showed "Acute diverticulitis at the splenic flexure with evidence of contained perforation. No drainable regional collection. Increased ascites in the deep pelvis." She was transferred to the SICU for further HD monitoring. Antibiotics were switched to Aztreonam and flagyl. Bedside ultrasound showed IV 1.5 minimally collapsable and echo consistent with euvolemic state.     PAST MEDICAL HISTORY: Vertigo  Atrial flutter  Diverticulitis  Cardiomyopathy  Kidney stone  COPD (chronic obstructive pulmonary disease)  Cardiac Pacemaker  HTN - Hypertension  Gout  Diabetes  Congestive Heart Failure  Asthma      PAST SURGICAL HISTORY: S/P cholecystectomy  AICD (Automatic Cardioverter/Defibrillator) Present  S/P Cholecystectomy      FAMILY HISTORY: Family history of brain tumor  No pertinent family history in first degree relatives      SOCIAL HISTORY:    CODE STATUS:     HOME MEDICATIONS:    ALLERGIES: Coreg (Other)  digoxin (Other; Short breath (Mild to Mod))  Lopressor (Short breath)  metoprolol (Other)  penicillins (Hives)  Solu-Medrol (Other (Mild to Mod))      VITAL SIGNS:  ICU Vital Signs Last 24 Hrs  T(C): 36.9 (01 Jul 2019 21:13), Max: 37.7 (01 Jul 2019 00:00)  T(F): 98.5 (01 Jul 2019 21:13), Max: 99.8 (01 Jul 2019 00:00)  HR: 116 (01 Jul 2019 21:15) (96 - 116)  BP: 117/62 (01 Jul 2019 21:15) (76/50 - 126/70)  BP(mean): 84 (01 Jul 2019 21:15) (57 - 87)  ABP: --  ABP(mean): --  RR: 32 (01 Jul 2019 21:15) (17 - 35)  SpO2: 97% (01 Jul 2019 21:15) (94% - 99%)      NEURO  Exam: A&Ox3      RESPIRATORY  ABG - ( 01 Jul 2019 05:42 )  pH: 7.33  /  pCO2: 38    /  pO2: 174   / HCO3: 19    / Base Excess: -5.8  /  SaO2: 98      Lactate: x        Exam:  Expiratory wheezing, Nonlabored breathing on 5LNC  ALBUTerol/ipratropium for Nebulization 3 milliLiter(s) Nebulizer every 6 hours PRN Shortness of Breath and/or Wheezing  ALBUTerol/ipratropium for Nebulization 3 milliLiter(s) Nebulizer every 4 hours  montelukast 10 milliGRAM(s) Oral daily      CARDIOVASCULAR  VBG - ( 01 Jul 2019 16:57 )  pH: 7.23  /  pCO2: 52    /  pO2: 43    / HCO3: 21    / Base Excess: -6.2  /  SaO2: 70     Lactate: 4.3      Exam:  Cardiac Rhythm:  phenylephrine    Infusion 0.1 MICROgram(s)/kG/Min IV Continuous <Continuous>    GI/NUTRITION  Exam: Diffusely tender to light palpation,   Diet: NPO IVF  pantoprazole  Injectable 40 milliGRAM(s) IV Push daily      GENITOURINARY/RENAL  dextrose 5% + lactated ringers. 1000 milliLiter(s) IV Continuous <Continuous>  sodium chloride 0.9% lock flush 10 milliLiter(s) IV Push every 1 hour PRN Pre/post blood products, medications, blood draw, and to maintain line patency      06-30 @ 07:01  -  07-01 @ 07:00  --------------------------------------------------------  IN:    IV PiggyBack: 1000 mL    phenylephrine   Infusion: 100 mL  Total IN: 1100 mL    OUT:    Intermittent Catheterization - Urethral: 300 mL    Voided: 200 mL  Total OUT: 500 mL    Total NET: 600 mL      07-01 @ 07:01  -  07-01 @ 21:31  --------------------------------------------------------  IN:    dextrose 5% + lactated ringers.: 600 mL    IV PiggyBack: 400 mL    Lactated Ringers IV Bolus: 500 mL    Oral Fluid: 50 mL    phenylephrine   Infusion: 602 mL  Total IN: 2152 mL    OUT:    Ureteral Catheter: 275 mL  Total OUT: 275 mL    Total NET: 1877 mL      07-01    136  |  98  |  38<H>  ----------------------------<  212<H>  4.6   |  17<L>  |  2.20<H>    Ca    9.3      01 Jul 2019 00:29  Phos  4.3     07-01  Mg     1.7     07-01    TPro  7.1  /  Alb  3.6  /  TBili  0.9  /  DBili  x   /  AST  18  /  ALT  14  /  AlkPhos  75  07-01    [x] Ching catheter, indication: urine output monitoring in critically ill patient    HEMATOLOGIC  [ ] VTE Prophylaxis: on hold                        13.8   5.0   )-----------( 217      ( 01 Jul 2019 00:29 )             42.8     PT/INR - ( 01 Jul 2019 00:29 )   PT: 17.8 sec;   INR: 1.54 ratio           Transfusion: [ ] PRBC	[ ] Platelets	[ ] FFP	[ ] Cryoprecipitate      INFECTIOUS DISEASES  ciprofloxacin   IVPB 400 milliGRAM(s) IV Intermittent every 12 hours  metroNIDAZOLE  IVPB 500 milliGRAM(s) IV Intermittent every 8 hours    RECENT CULTURES:      ENDOCRINE  atorvastatin 20 milliGRAM(s) Oral at bedtime  insulin lispro (HumaLOG) corrective regimen sliding scale   SubCutaneous every 6 hours  methylPREDNISolone sodium succinate Injectable 20 milliGRAM(s) IV Push every 8 hours    CAPILLARY BLOOD GLUCOSE      POCT Blood Glucose.: 205 mg/dL (01 Jul 2019 17:16)  POCT Blood Glucose.: 202 mg/dL (01 Jul 2019 12:43)  POCT Blood Glucose.: 191 mg/dL (01 Jul 2019 06:01)  POCT Blood Glucose.: 184 mg/dL (30 Jun 2019 23:32)      PATIENT CARE ACCESS DEVICES:  [x] Peripheral IV x3  [x] Central Venous Line	[x] R	[ ] L	[x] IJ	[ ] Fem	[ ] SC	Placed:   [ ] Arterial Line		[ ] R	[ ] L	[ ] Fem	[ ] Rad	[ ] Ax	Placed:   [ ] PICC:					[ ] Mediport  [x] Urinary Catheter, Date Placed: 6/30/19  [x] Necessity of urinary, arterial, and venous catheters discussed    OTHER MEDICATIONS: chlorhexidine 4% Liquid 1 Application(s) Topical <User Schedule> SICU CONSULT NOTE    HISTORY OF PRESENT ILLNESS:  74 F with PMHx of HFrEF s/p AICD, asthma, HTN, HLD, atrial fibrillation on eliquis p/w severe, sudden onset L sided abd pain which started yesterday morning after eating. She endorsed nausea at the time but no vomiting. She did have any fevers, chills, diarrhea or hematochezia.      CTAP w/ IV contrast showed proximal descending colon/splenic flexure diverticulitis with small amount of free fluid and 3cm low attenuation structure in the right adnexa ?fluid collection ?ovarian cyst. She developed dyspnea and wheezing after the CT scan and became hypoxic. Bedside US int he ED demonestrated a few B lines. She received 80mg total of IV lasix, 125 of solumedrol, duonebs x2 and narcan and she was admitted to MICU for management of respiratory and heart failure.     Overnight through this morning she was persistently tachycardic with BPs as low as 76/50, MAPs in low 60s. She was bolused with 500cc of crystalloid without improvement in blood pressure. She was then started on phenylephrine. Her lactate was uptrending from 2 to 2.6 to 3 on the arterial blood gas. A repeat CT scan showed "Acute diverticulitis at the splenic flexure with evidence of contained perforation. No drainable regional collection. Increased ascites in the deep pelvis." She was transferred to the SICU for further HD monitoring. Antibiotics were switched to Aztreonam and flagyl. Bedside ultrasound showed IV 1.5 minimally collapsable and echo consistent with euvolemic state.     PAST MEDICAL HISTORY: Vertigo  Atrial flutter  Diverticulitis  Cardiomyopathy  Kidney stone  COPD (chronic obstructive pulmonary disease)  Cardiac Pacemaker  HTN - Hypertension  Gout  Diabetes  Congestive Heart Failure  Asthma      PAST SURGICAL HISTORY: S/P cholecystectomy  AICD (Automatic Cardioverter/Defibrillator) Present  S/P Cholecystectomy      FAMILY HISTORY: Family history of brain tumor  No pertinent family history in first degree relatives    SOCIAL HISTORY:    CODE STATUS:     HOME MEDICATIONS:  Enalapril 10mg BID  Aspirin 81mg daily  Lasix 20mg BID PRN  Lantus 8U PRN  Lipitor 20mg daily  Pepcid AC 10mg daily  Zyrtec 10mg daily   Magnesium oxide 500mg TID  Singular 10mg daily  Ventolin inhaler  Dulera inhaler 2 puffs  Ipratroprium bromide/Albuterol 2.5 mg 4xday    ALLERGIES: Coreg (Other)  digoxin (Other; Short breath (Mild to Mod))  Lopressor (Short breath)  metoprolol (Other)  penicillins (Hives)  Solu-Medrol (Other (Mild to Mod))    VITAL SIGNS:  ICU Vital Signs Last 24 Hrs  T(C): 36.9 (01 Jul 2019 21:13), Max: 37.7 (01 Jul 2019 00:00)  T(F): 98.5 (01 Jul 2019 21:13), Max: 99.8 (01 Jul 2019 00:00)  HR: 116 (01 Jul 2019 21:15) (96 - 116)  BP: 117/62 (01 Jul 2019 21:15) (76/50 - 126/70)  BP(mean): 84 (01 Jul 2019 21:15) (57 - 87)  ABP: --  ABP(mean): --  RR: 32 (01 Jul 2019 21:15) (17 - 35)  SpO2: 97% (01 Jul 2019 21:15) (94% - 99%)    NEURO  Exam: A&Ox3, no focal deficits      RESPIRATORY  ABG - ( 01 Jul 2019 05:42 )  pH: 7.33  /  pCO2: 38    /  pO2: 174   / HCO3: 19    / Base Excess: -5.8  /  SaO2: 98      Lactate: x        Exam:  Bilateral epiratory wheezing, Nonlabored breathing on 5LNC  ALBUTerol/ipratropium for Nebulization 3 milliLiter(s) Nebulizer every 6 hours PRN Shortness of Breath and/or Wheezing  ALBUTerol/ipratropium for Nebulization 3 milliLiter(s) Nebulizer every 4 hours  montelukast 10 milliGRAM(s) Oral daily      CARDIOVASCULAR  VBG - ( 01 Jul 2019 16:57 )  pH: 7.23  /  pCO2: 52    /  pO2: 43    / HCO3: 21    / Base Excess: -6.2  /  SaO2: 70     Lactate: 4.3      Exam:  Tachycardic, regular rhythm  phenylephrine    Infusion 0.1 MICROgram(s)/kG/Min IV Continuous <Continuous>    GI/NUTRITION  Exam: Diffusely tender to light palpation,   Diet: NPO IVF  pantoprazole  Injectable 40 milliGRAM(s) IV Push daily    GENITOURINARY/RENAL  dextrose 5% + lactated ringers. 1000 milliLiter(s) IV Continuous <Continuous>  sodium chloride 0.9% lock flush 10 milliLiter(s) IV Push every 1 hour PRN Pre/post blood products, medications, blood draw, and to maintain line patency      06-30 @ 07:01  -  07-01 @ 07:00  --------------------------------------------------------  IN:    IV PiggyBack: 1000 mL    phenylephrine   Infusion: 100 mL  Total IN: 1100 mL    OUT:    Intermittent Catheterization - Urethral: 300 mL    Voided: 200 mL  Total OUT: 500 mL    Total NET: 600 mL      07-01 @ 07:01 - 07-01 @ 21:31  --------------------------------------------------------  IN:    dextrose 5% + lactated ringers.: 600 mL    IV PiggyBack: 400 mL    Lactated Ringers IV Bolus: 500 mL    Oral Fluid: 50 mL    phenylephrine   Infusion: 602 mL  Total IN: 2152 mL    OUT:    Ureteral Catheter: 275 mL  Total OUT: 275 mL    Total NET: 1877 mL      07-01    136  |  98  |  38<H>  ----------------------------<  212<H>  4.6   |  17<L>  |  2.20<H>    Ca    9.3      01 Jul 2019 00:29  Phos  4.3     07-01  Mg     1.7     07-01    TPro  7.1  /  Alb  3.6  /  TBili  0.9  /  DBili  x   /  AST  18  /  ALT  14  /  AlkPhos  75  07-01    [x] Ching catheter, indication: urine output monitoring in critically ill patient    HEMATOLOGIC  [ ] VTE Prophylaxis: on hold                        13.8   5.0   )-----------( 217      ( 01 Jul 2019 00:29 )             42.8     PT/INR - ( 01 Jul 2019 00:29 )   PT: 17.8 sec;   INR: 1.54 ratio      INFECTIOUS DISEASES  ciprofloxacin   IVPB 400 milliGRAM(s) IV Intermittent every 12 hours  metroNIDAZOLE  IVPB 500 milliGRAM(s) IV Intermittent every 8 hours    RECENT CULTURES:      ENDOCRINE  atorvastatin 20 milliGRAM(s) Oral at bedtime  insulin lispro (HumaLOG) corrective regimen sliding scale   SubCutaneous every 6 hours  methylPREDNISolone sodium succinate Injectable 20 milliGRAM(s) IV Push every 8 hours    CAPILLARY BLOOD GLUCOSE  POCT Blood Glucose.: 205 mg/dL (01 Jul 2019 17:16)  POCT Blood Glucose.: 202 mg/dL (01 Jul 2019 12:43)  POCT Blood Glucose.: 191 mg/dL (01 Jul 2019 06:01)  POCT Blood Glucose.: 184 mg/dL (30 Jun 2019 23:32)      PATIENT CARE ACCESS DEVICES:  [x] Peripheral IV x3  [x] Central Venous Line	[x] R	[ ] L	[x] IJ	[ ] Fem	[ ] SC	Placed:   [ ] Arterial Line		[ ] R	[ ] L	[ ] Fem	[ ] Rad	[ ] Ax	Placed:   [ ] PICC:					[ ] Mediport  [x] Urinary Catheter, Date Placed: 6/30/19  [x] Necessity of urinary, arterial, and venous catheters discussed    OTHER MEDICATIONS: chlorhexidine 4% Liquid 1 Application(s) Topical <User Schedule> SICU CONSULT NOTE    HISTORY OF PRESENT ILLNESS:  74 F with PMHx of HFrEF s/p AICD, asthma, HTN, HLD, atrial fibrillation on eliquis p/w severe, sudden onset L sided abd pain which started yesterday morning after eating. She endorsed nausea at the time but no vomiting. She did have any fevers, chills, diarrhea or hematochezia.      CTAP w/ IV contrast showed proximal descending colon/splenic flexure diverticulitis with small amount of free fluid and 3cm low attenuation structure in the right adnexa ?fluid collection ?ovarian cyst. She developed dyspnea and wheezing after the CT scan and became hypoxic. Bedside US int he ED demonestrated a few B lines. She received 80mg total of IV lasix, 125 of solumedrol, duonebs x2 and narcan and she was admitted to MICU for management of respiratory and heart failure.     Overnight through this morning she was persistently tachycardic with BPs as low as 76/50, MAPs in low 60s. She was bolused with 500cc of crystalloid without improvement in blood pressure. She was then started on phenylephrine. Her lactate was uptrending from 2 to 2.6 to 3 on the arterial blood gas. A repeat CT scan showed "Acute diverticulitis at the splenic flexure with evidence of contained perforation. No drainable regional collection. Increased ascites in the deep pelvis." She was transferred to the SICU for further HD monitoring. Antibiotics were switched to Aztreonam and flagyl. Bedside ultrasound showed IV 1.5 minimally collapsable and echo consistent with euvolemic state.     Of note - Patient is a Religious and does not accept packed red cells, platelets, white cells or frozen plasma.     PAST MEDICAL HISTORY: Vertigo  Atrial flutter  Diverticulitis  Cardiomyopathy  Kidney stone  COPD (chronic obstructive pulmonary disease)  Cardiac Pacemaker  HTN - Hypertension  Gout  Diabetes  Congestive Heart Failure  Asthma      PAST SURGICAL HISTORY: S/P cholecystectomy  AICD (Automatic Cardioverter/Defibrillator) Present  S/P Cholecystectomy      FAMILY HISTORY: Family history of brain tumor  No pertinent family history in first degree relatives    SOCIAL HISTORY:    CODE STATUS:     HOME MEDICATIONS:  Enalapril 10mg BID  Aspirin 81mg daily  Lasix 20mg BID PRN  Lantus 8U PRN  Lipitor 20mg daily  Pepcid AC 10mg daily  Zyrtec 10mg daily   Magnesium oxide 500mg TID  Singular 10mg daily  Ventolin inhaler  Dulera inhaler 2 puffs  Ipratroprium bromide/Albuterol 2.5 mg 4xday    ALLERGIES: Coreg (Other)  digoxin (Other; Short breath (Mild to Mod))  Lopressor (Short breath)  metoprolol (Other)  penicillins (Hives)  Solu-Medrol (Other (Mild to Mod))    VITAL SIGNS:  ICU Vital Signs Last 24 Hrs  T(C): 36.9 (01 Jul 2019 21:13), Max: 37.7 (01 Jul 2019 00:00)  T(F): 98.5 (01 Jul 2019 21:13), Max: 99.8 (01 Jul 2019 00:00)  HR: 116 (01 Jul 2019 21:15) (96 - 116)  BP: 117/62 (01 Jul 2019 21:15) (76/50 - 126/70)  BP(mean): 84 (01 Jul 2019 21:15) (57 - 87)  ABP: --  ABP(mean): --  RR: 32 (01 Jul 2019 21:15) (17 - 35)  SpO2: 97% (01 Jul 2019 21:15) (94% - 99%)    NEURO  Exam: A&Ox3, no focal deficits      RESPIRATORY  ABG - ( 01 Jul 2019 05:42 )  pH: 7.33  /  pCO2: 38    /  pO2: 174   / HCO3: 19    / Base Excess: -5.8  /  SaO2: 98      Lactate: x        Exam:  Bilateral epiratory wheezing, Nonlabored breathing on 5LNC  ALBUTerol/ipratropium for Nebulization 3 milliLiter(s) Nebulizer every 6 hours PRN Shortness of Breath and/or Wheezing  ALBUTerol/ipratropium for Nebulization 3 milliLiter(s) Nebulizer every 4 hours  montelukast 10 milliGRAM(s) Oral daily      CARDIOVASCULAR  VBG - ( 01 Jul 2019 16:57 )  pH: 7.23  /  pCO2: 52    /  pO2: 43    / HCO3: 21    / Base Excess: -6.2  /  SaO2: 70     Lactate: 4.3      Exam:  Tachycardic, regular rhythm  phenylephrine    Infusion 0.1 MICROgram(s)/kG/Min IV Continuous <Continuous>    GI/NUTRITION  Exam: Diffusely tender to light palpation,   Diet: NPO IVF  pantoprazole  Injectable 40 milliGRAM(s) IV Push daily    GENITOURINARY/RENAL  dextrose 5% + lactated ringers. 1000 milliLiter(s) IV Continuous <Continuous>  sodium chloride 0.9% lock flush 10 milliLiter(s) IV Push every 1 hour PRN Pre/post blood products, medications, blood draw, and to maintain line patency      06-30 @ 07:01  -  07-01 @ 07:00  --------------------------------------------------------  IN:    IV PiggyBack: 1000 mL    phenylephrine   Infusion: 100 mL  Total IN: 1100 mL    OUT:    Intermittent Catheterization - Urethral: 300 mL    Voided: 200 mL  Total OUT: 500 mL    Total NET: 600 mL      07-01 @ 07:01  -  07-01 @ 21:31  --------------------------------------------------------  IN:    dextrose 5% + lactated ringers.: 600 mL    IV PiggyBack: 400 mL    Lactated Ringers IV Bolus: 500 mL    Oral Fluid: 50 mL    phenylephrine   Infusion: 602 mL  Total IN: 2152 mL    OUT:    Ureteral Catheter: 275 mL  Total OUT: 275 mL    Total NET: 1877 mL      07-01    136  |  98  |  38<H>  ----------------------------<  212<H>  4.6   |  17<L>  |  2.20<H>    Ca    9.3      01 Jul 2019 00:29  Phos  4.3     07-01  Mg     1.7     07-01    TPro  7.1  /  Alb  3.6  /  TBili  0.9  /  DBili  x   /  AST  18  /  ALT  14  /  AlkPhos  75  07-01    [x] Ching catheter, indication: urine output monitoring in critically ill patient    HEMATOLOGIC  [ ] VTE Prophylaxis: on hold                        13.8   5.0   )-----------( 217      ( 01 Jul 2019 00:29 )             42.8     PT/INR - ( 01 Jul 2019 00:29 )   PT: 17.8 sec;   INR: 1.54 ratio      INFECTIOUS DISEASES  ciprofloxacin   IVPB 400 milliGRAM(s) IV Intermittent every 12 hours  metroNIDAZOLE  IVPB 500 milliGRAM(s) IV Intermittent every 8 hours    RECENT CULTURES:      ENDOCRINE  atorvastatin 20 milliGRAM(s) Oral at bedtime  insulin lispro (HumaLOG) corrective regimen sliding scale   SubCutaneous every 6 hours  methylPREDNISolone sodium succinate Injectable 20 milliGRAM(s) IV Push every 8 hours    CAPILLARY BLOOD GLUCOSE  POCT Blood Glucose.: 205 mg/dL (01 Jul 2019 17:16)  POCT Blood Glucose.: 202 mg/dL (01 Jul 2019 12:43)  POCT Blood Glucose.: 191 mg/dL (01 Jul 2019 06:01)  POCT Blood Glucose.: 184 mg/dL (30 Jun 2019 23:32)      PATIENT CARE ACCESS DEVICES:  [x] Peripheral IV x3  [x] Central Venous Line	[x] R	[ ] L	[x] IJ	[ ] Fem	[ ] SC	Placed:   [ ] Arterial Line		[ ] R	[ ] L	[ ] Fem	[ ] Rad	[ ] Ax	Placed:   [ ] PICC:					[ ] Mediport  [x] Urinary Catheter, Date Placed: 6/30/19  [x] Necessity of urinary, arterial, and venous catheters discussed    OTHER MEDICATIONS: chlorhexidine 4% Liquid 1 Application(s) Topical <User Schedule>

## 2019-07-01 NOTE — CONSULT NOTE ADULT - ASSESSMENT
ASSESSMENT:  74F admitted with abdominal pain and exam findings consistent with peritonitis secondary to acute diverticulitis at the splenic flexure with evidence of contained perforation    PLAN:    NEURO:    RESPIRATORY:     CARDIOVASCULAR:    GI/NUTRITION:    GENITOURINARY/RENAL:    HEMATOLOGIC:    INFECTIOUS DISEASE:    ENDOCRINE:    Facundo Juarez, PGY2 ASSESSMENT:  74F admitted with abdominal pain and exam findings consistent with peritonitis secondary to acute diverticulitis at the splenic flexure with evidence of contained perforation    PLAN:  NEURO:  - Pain control with Tylenol and dilaudid    RESPIRATORY:   - Cont duonebs  - Monitor Oxygen requirement    CARDIOVASCULAR:  - Wean phenylephrine infusion as tolerated with goal MAP greater than 65.  - Cautious fluid resuscitation   - Hold home anti-hypertensives    GI/NUTRITION:  - NPO, IVF    GENITOURINARY/RENAL:  - Renally dose antibiotics    HEMATOLOGIC:  - Patient does NOT accept blood products  - Hold ASA   - 50U/kg of Kcentra to reverse Eliquis    INFECTIOUS DISEASE:  - Aztreonam and Flagyl     ENDOCRINE:  - ISS, HgA1C    Shira Miller  SICU

## 2019-07-01 NOTE — CHART NOTE - NSCHARTNOTEFT_GEN_A_CORE
MICU Transfer Note    Transfer from: MICU    Transfer to: (  ) Medicine    (  ) Telemetry     (   ) RCU        (    ) Palliative         (   ) Stroke Unit          ( x  ) SICU      HPI:  74 F with PMHx of HFrEF s/p AICD, asthma, HTN, HLD, atrial fibrillation on eliquis p/w severe, sudden onset L sided abd pain that started this AM. Patient had just finished her meal when she developed left sided abdominal pain. She reports nausea but no vomiting. She denies any fevers, chills, diarrhea or hematochezia. Patient denies having abdominal pain like this in the past.     Upon arrival to ED, patient with stable vitals. She received 4mg of IV morphine for pain. She subsequently went for CT of abdomen/pelvis with IV contrast to evaluate for cause of abdominal pain. After CT, she received additional morphine for pain. Shortly after that, patient started to have palpitations and shortess of breath. She started to have wheezing and became hypoxic. Bedside US by ED was limited 2/2 to habitus, but it revealed a few B lines. She received 80mg total of IV lasix, 125 of solumedrol, duonebs x2 and narcan.     MICU consulted for hypoxic respiratory failure. Upon evaluation, patient's VS , RR 30, O2 sat 100% on 100% FiO2 and /72. Patient's FiO2 decreased to 40% and patient with O2 sat of 100%. Patient admitted to MICU for management of respiratory and heart failure.         MICU COURSE:          ASSESSMENT & PLAN:             FOR FOLLOW UP:

## 2019-07-01 NOTE — CONSULT NOTE ADULT - ASSESSMENT
Impression:  # Abdominal Pain: Patient with worsened abdominal pain with diffuse abdominal tenderness in the setting of diverticulitis. Concern for perforated diverticulitis. Lactate 3.3, less likely ischemia. Pancreatitis unlikely given imaging findings and normal lipase.   # Acute Hypoxic Resp Failure; now off BiPAP. On steroids  #HFrEF s/p AICD  # Afib: on Eliquis  # Asthma  # DIANNA: FeUrea consistent with pre-renal etiology  # DM    Recommendation:  - Follow up CT as ordered  - Trend lactate  - NPO   - Supportive care per primary team    Agnieszka Fisher MD  Gastroenterology Fellow  [phone]  Please page on call fellow on weekends and after 5pm on weekdays Impression:  # Abdominal Pain: Patient with worsened abdominal pain with diffuse abdominal tenderness in the setting of diverticulitis. Concern for perforated diverticulitis. Lactate 3.3, less likely ischemia. Pancreatitis unlikely given imaging findings and normal lipase.   # Acute Hypoxic Resp Failure; now off BiPAP. On steroids  #HFrEF s/p AICD  # Afib: on Eliquis  # Asthma  # DIANNA: FeUrea consistent with pre-renal etiology  # DM    Recommendation:  - Follow up CT as ordered to rule out free air  - Trend lactate  - NPO   - Supportive care per primary team    Agnieszka Fisher MD  Gastroenterology Fellow  [phone]  Please page on call fellow on weekends and after 5pm on weekdays Impression:  # Abdominal Pain: Patient with worsened abdominal pain with diffuse abdominal tenderness in the setting of diverticulitis. Concern for perforated diverticulitis as seen on most recent CT. Lactate 3.3, less likely ischemia. Pancreatitis unlikely given imaging findings and normal lipase.   # Acute Hypoxic Resp Failure; now off BiPAP. On steroids  #HFrEF s/p AICD  # Afib: on Eliquis  # Asthma  # DIANNA: FeUrea consistent with pre-renal etiology  # DM    Recommendation:  - Would obtain surgical consultation  - Continue board spectrum antibiotics  - Trend lactate  - NPO   - Supportive care per primary team    Agnieszka Fisher MD  Gastroenterology Fellow  [phone]  Please page on call fellow on weekends and after 5pm on weekdays

## 2019-07-01 NOTE — PROGRESS NOTE ADULT - SUBJECTIVE AND OBJECTIVE BOX
INTERVAL HPI/OVERNIGHT EVENTS:    SUBJECTIVE: MAPs in low 60s overnight. Given 500cc IVF w/o improvement. Patient now on phenylephrine gtt.     OBJECTIVE:    VITAL SIGNS:  ICU Vital Signs Last 24 Hrs  T(C): 36.7 (2019 04:00), Max: 38.2 (2019 21:00)  T(F): 98 (2019 04:00), Max: 100.8 (2019 21:00)  HR: 96 (2019 05:00) (73 - 161)  BP: 105/56 (2019 05:00) (80/55 - 164/84)  BP(mean): 77 (2019 05:00) (60 - 104)  ABP: --  ABP(mean): --  RR: 25 (2019 05:00) (17 - 36)  SpO2: 98% (2019 05:00) (95% - 100%)        06-30 @ 07:01  -  07-01 @ 06:12  --------------------------------------------------------  IN: 725 mL / OUT: 300 mL / NET: 425 mL      CAPILLARY BLOOD GLUCOSE      POCT Blood Glucose.: 191 mg/dL (2019 06:01)      PHYSICAL EXAM:    General: NAD  HEENT: NC/AT; PERRL, clear conjunctiva  Neck: supple  Respiratory: CTA b/l, no wheezing appreciated  Cardiovascular: +S1/S2; RRR  Abdomen: soft, NT/ND; +BS x4  Extremities: 1+ LE edema  Skin: normal color and turgor; no rash  Neurological:  awake and alert, RDZ, no focal deficits    MEDICATIONS:  MEDICATIONS  (STANDING):  apixaban 5 milliGRAM(s) Oral every 12 hours  aspirin enteric coated 81 milliGRAM(s) Oral daily  atorvastatin 20 milliGRAM(s) Oral at bedtime  chlorhexidine 4% Liquid 1 Application(s) Topical <User Schedule>  ciprofloxacin   IVPB 400 milliGRAM(s) IV Intermittent every 12 hours  insulin glargine Injectable (LANTUS) 12 Unit(s) SubCutaneous at bedtime  insulin lispro (HumaLOG) corrective regimen sliding scale   SubCutaneous every 6 hours  methylPREDNISolone sodium succinate Injectable 20 milliGRAM(s) IV Push every 8 hours  metroNIDAZOLE  IVPB 500 milliGRAM(s) IV Intermittent every 8 hours  montelukast 10 milliGRAM(s) Oral daily  pantoprazole  Injectable 40 milliGRAM(s) IV Push daily  phenylephrine    Infusion 0.1 MICROgram(s)/kG/Min (3.12 mL/Hr) IV Continuous <Continuous>    MEDICATIONS  (PRN):  ALBUTerol/ipratropium for Nebulization 3 milliLiter(s) Nebulizer every 6 hours PRN Shortness of Breath and/or Wheezing      ALLERGIES:  Allergies    Coreg (Other)  digoxin (Other; Short breath (Mild to Mod))  Lopressor (Short breath)  penicillins (Hives)    Intolerances    metoprolol (Other)  Solu-Medrol (Other (Mild to Mod))      LABS:                        13.8   5.0   )-----------( 217      ( 2019 00:29 )             42.8     Hemoglobin: 13.8 g/dL ( @ 00:29)  Hemoglobin: 14.4 g/dL ( @ 18:00)  Hemoglobin: 11.7 g/dL ( @ 14:45)    CBC Full  -  ( 2019 00:29 )  WBC Count : 5.0 K/uL  RBC Count : 4.44 M/uL  Hemoglobin : 13.8 g/dL  Hematocrit : 42.8 %  Platelet Count - Automated : 217 K/uL  Mean Cell Volume : 96.4 fl  Mean Cell Hemoglobin : 31.0 pg  Mean Cell Hemoglobin Concentration : 32.1 gm/dL  Auto Neutrophil # : x  Auto Lymphocyte # : x  Auto Monocyte # : x  Auto Eosinophil # : x  Auto Basophil # : x  Auto Neutrophil % : x  Auto Lymphocyte % : x  Auto Monocyte % : x  Auto Eosinophil % : x  Auto Basophil % : x        136  |  98  |  38<H>  ----------------------------<  212<H>  4.6   |  17<L>  |  2.20<H>    Ca    9.3      2019 00:29  Phos  4.3     07  Mg     1.7         TPro  7.1  /  Alb  3.6  /  TBili  0.9  /  DBili  x   /  AST  18  /  ALT  14  /  AlkPhos  75  07    Creatinine Trend: 2.20<--, 1.44<--, 1.41<--, 1.15<--  LIVER FUNCTIONS - ( 2019 00:29 )  Alb: 3.6 g/dL / Pro: 7.1 g/dL / ALK PHOS: 75 U/L / ALT: 14 U/L / AST: 18 U/L / GGT: x           PT/INR - ( 2019 00:29 )   PT: 17.8 sec;   INR: 1.54 ratio           CARDIAC MARKERS ( 2019 18:00 )  x     / x     / x     / x     / 1.9 ng/mL      ABG - ( 2019 05:42 )  pH, Arterial: 7.33  pH, Blood: x     /  pCO2: 38    /  pO2: 174   / HCO3: 19    / Base Excess: -5.8  /  SaO2: 98                  05:42 - ABG - pH: 7.33  |  pCO2: 38    |  pO2: 174   | Lactate:       | BE: -5.8   18:00 - VBG - pH: 7.31  | pCO2: 52    | pO2: 40    | Lactate: 2.6    14:45 - VBG - pH: 7.34  | pCO2: 56    | pO2: 28    | Lactate: 2.0        Urinalysis Basic - ( 2019 15:38 )    Color: Light Yellow / Appearance: Clear / S.013 / pH: x  Gluc: x / Ketone: Negative  / Bili: Negative / Urobili: Negative   Blood: x / Protein: Negative / Nitrite: Negative   Leuk Esterase: Large / RBC: 3 /hpf / WBC 10 /HPF   Sq Epi: x / Non Sq Epi: 3 /hpf / Bacteria: Few      RADIOLOGY & ADDITIONAL TESTS: Reviewed.

## 2019-07-02 ENCOUNTER — TRANSCRIPTION ENCOUNTER (OUTPATIENT)
Age: 74
End: 2019-07-02

## 2019-07-02 DIAGNOSIS — K57.20 DIVERTICULITIS OF LARGE INTESTINE WITH PERFORATION AND ABSCESS WITHOUT BLEEDING: ICD-10-CM

## 2019-07-02 DIAGNOSIS — A41.9 SEPSIS, UNSPECIFIED ORGANISM: ICD-10-CM

## 2019-07-02 DIAGNOSIS — N17.9 ACUTE KIDNEY FAILURE, UNSPECIFIED: ICD-10-CM

## 2019-07-02 DIAGNOSIS — I50.22 CHRONIC SYSTOLIC (CONGESTIVE) HEART FAILURE: ICD-10-CM

## 2019-07-02 LAB
ALBUMIN SERPL ELPH-MCNC: 2 G/DL — LOW (ref 3.3–5)
ALBUMIN SERPL ELPH-MCNC: 3.9 G/DL
ALP BLD-CCNC: 89 U/L
ALP SERPL-CCNC: 31 U/L — LOW (ref 40–120)
ALT FLD-CCNC: 19 U/L — SIGNIFICANT CHANGE UP (ref 10–45)
ALT SERPL-CCNC: 13 U/L
ANION GAP SERPL CALC-SCNC: 13 MMOL/L — SIGNIFICANT CHANGE UP (ref 5–17)
ANION GAP SERPL CALC-SCNC: 15 MMOL/L — SIGNIFICANT CHANGE UP (ref 5–17)
ANION GAP SERPL CALC-SCNC: 16 MMOL/L
ANION GAP SERPL CALC-SCNC: 16 MMOL/L — SIGNIFICANT CHANGE UP (ref 5–17)
ANION GAP SERPL CALC-SCNC: 18 MMOL/L — HIGH (ref 5–17)
ANION GAP SERPL CALC-SCNC: 18 MMOL/L — HIGH (ref 5–17)
APTT BLD: 28.7 SEC — SIGNIFICANT CHANGE UP (ref 27.5–36.3)
APTT BLD: 34.5 SEC — SIGNIFICANT CHANGE UP (ref 27.5–36.3)
AST SERPL-CCNC: 12 U/L
AST SERPL-CCNC: 30 U/L — SIGNIFICANT CHANGE UP (ref 10–40)
BASE EXCESS BLDV CALC-SCNC: -6.7 MMOL/L — LOW (ref -2–2)
BASE EXCESS BLDV CALC-SCNC: -7.8 MMOL/L — LOW (ref -2–2)
BILIRUB DIRECT SERPL-MCNC: 0.2 MG/DL — SIGNIFICANT CHANGE UP (ref 0–0.2)
BILIRUB INDIRECT FLD-MCNC: 0.3 MG/DL — SIGNIFICANT CHANGE UP (ref 0.2–1)
BILIRUB SERPL-MCNC: 0.3 MG/DL
BILIRUB SERPL-MCNC: 0.5 MG/DL — SIGNIFICANT CHANGE UP (ref 0.2–1.2)
BLD GP AB SCN SERPL QL: NEGATIVE — SIGNIFICANT CHANGE UP
BUN SERPL-MCNC: 24 MG/DL
BUN SERPL-MCNC: 30 MG/DL — HIGH (ref 7–23)
BUN SERPL-MCNC: 34 MG/DL — HIGH (ref 7–23)
BUN SERPL-MCNC: 35 MG/DL — HIGH (ref 7–23)
BUN SERPL-MCNC: 38 MG/DL — HIGH (ref 7–23)
BUN SERPL-MCNC: 45 MG/DL — HIGH (ref 7–23)
CALCIUM SERPL-MCNC: 8.1 MG/DL — LOW (ref 8.4–10.5)
CALCIUM SERPL-MCNC: 8.4 MG/DL — SIGNIFICANT CHANGE UP (ref 8.4–10.5)
CALCIUM SERPL-MCNC: 8.4 MG/DL — SIGNIFICANT CHANGE UP (ref 8.4–10.5)
CALCIUM SERPL-MCNC: 8.5 MG/DL — SIGNIFICANT CHANGE UP (ref 8.4–10.5)
CALCIUM SERPL-MCNC: 8.8 MG/DL — SIGNIFICANT CHANGE UP (ref 8.4–10.5)
CALCIUM SERPL-MCNC: 9 MG/DL
CHLORIDE SERPL-SCNC: 102 MMOL/L
CHLORIDE SERPL-SCNC: 107 MMOL/L — SIGNIFICANT CHANGE UP (ref 96–108)
CHLORIDE SERPL-SCNC: 108 MMOL/L — SIGNIFICANT CHANGE UP (ref 96–108)
CHLORIDE SERPL-SCNC: 111 MMOL/L — HIGH (ref 96–108)
CHLORIDE SERPL-SCNC: 112 MMOL/L — HIGH (ref 96–108)
CHLORIDE SERPL-SCNC: 113 MMOL/L — HIGH (ref 96–108)
CO2 BLDV-SCNC: 21 MMOL/L — LOW (ref 22–30)
CO2 BLDV-SCNC: 23 MMOL/L — SIGNIFICANT CHANGE UP (ref 22–30)
CO2 SERPL-SCNC: 17 MMOL/L — LOW (ref 22–31)
CO2 SERPL-SCNC: 17 MMOL/L — LOW (ref 22–31)
CO2 SERPL-SCNC: 18 MMOL/L — LOW (ref 22–31)
CO2 SERPL-SCNC: 18 MMOL/L — LOW (ref 22–31)
CO2 SERPL-SCNC: 20 MMOL/L — LOW (ref 22–31)
CO2 SERPL-SCNC: 24 MMOL/L
CREAT SERPL-MCNC: 1.33 MG/DL — HIGH (ref 0.5–1.3)
CREAT SERPL-MCNC: 1.48 MG/DL
CREAT SERPL-MCNC: 1.51 MG/DL — HIGH (ref 0.5–1.3)
CREAT SERPL-MCNC: 1.62 MG/DL — HIGH (ref 0.5–1.3)
CREAT SERPL-MCNC: 1.8 MG/DL — HIGH (ref 0.5–1.3)
CREAT SERPL-MCNC: 1.99 MG/DL — HIGH (ref 0.5–1.3)
CULTURE RESULTS: NO GROWTH — SIGNIFICANT CHANGE UP
FIBRINOGEN PPP-MCNC: 1030 MG/DL — HIGH (ref 350–510)
GAS PNL BLDA: SIGNIFICANT CHANGE UP
GAS PNL BLDV: SIGNIFICANT CHANGE UP
GLUCOSE BLDC GLUCOMTR-MCNC: 145 MG/DL — HIGH (ref 70–99)
GLUCOSE BLDC GLUCOMTR-MCNC: 191 MG/DL — HIGH (ref 70–99)
GLUCOSE BLDC GLUCOMTR-MCNC: 196 MG/DL — HIGH (ref 70–99)
GLUCOSE BLDC GLUCOMTR-MCNC: 199 MG/DL — HIGH (ref 70–99)
GLUCOSE BLDC GLUCOMTR-MCNC: 205 MG/DL — HIGH (ref 70–99)
GLUCOSE BLDC GLUCOMTR-MCNC: 211 MG/DL — HIGH (ref 70–99)
GLUCOSE BLDC GLUCOMTR-MCNC: 224 MG/DL — HIGH (ref 70–99)
GLUCOSE SERPL-MCNC: 132 MG/DL — HIGH (ref 70–99)
GLUCOSE SERPL-MCNC: 161 MG/DL — HIGH (ref 70–99)
GLUCOSE SERPL-MCNC: 183 MG/DL
GLUCOSE SERPL-MCNC: 199 MG/DL — HIGH (ref 70–99)
GLUCOSE SERPL-MCNC: 215 MG/DL — HIGH (ref 70–99)
GLUCOSE SERPL-MCNC: 238 MG/DL — HIGH (ref 70–99)
HCO3 BLDV-SCNC: 20 MMOL/L — LOW (ref 21–29)
HCO3 BLDV-SCNC: 21 MMOL/L — SIGNIFICANT CHANGE UP (ref 21–29)
HCT VFR BLD CALC: 34.9 % — SIGNIFICANT CHANGE UP (ref 34.5–45)
HCT VFR BLD CALC: 35.7 % — SIGNIFICANT CHANGE UP (ref 34.5–45)
HGB BLD-MCNC: 11.3 G/DL — LOW (ref 11.5–15.5)
HGB BLD-MCNC: 12.1 G/DL — SIGNIFICANT CHANGE UP (ref 11.5–15.5)
HOROWITZ INDEX BLDV+IHG-RTO: 40 — SIGNIFICANT CHANGE UP
HOROWITZ INDEX BLDV+IHG-RTO: 40 — SIGNIFICANT CHANGE UP
INR BLD: 2.19 RATIO — HIGH (ref 0.88–1.16)
INR BLD: 2.75 RATIO — HIGH (ref 0.88–1.16)
LACTATE BLDV-MCNC: 5.9 MMOL/L — CRITICAL HIGH (ref 0.7–2)
MAGNESIUM SERPL-MCNC: 1.4 MG/DL — LOW (ref 1.6–2.6)
MAGNESIUM SERPL-MCNC: 1.7 MG/DL
MAGNESIUM SERPL-MCNC: 2.2 MG/DL — SIGNIFICANT CHANGE UP (ref 1.6–2.6)
MAGNESIUM SERPL-MCNC: 2.4 MG/DL — SIGNIFICANT CHANGE UP (ref 1.6–2.6)
MAGNESIUM SERPL-MCNC: 2.6 MG/DL — SIGNIFICANT CHANGE UP (ref 1.6–2.6)
MAGNESIUM SERPL-MCNC: 3.1 MG/DL — HIGH (ref 1.6–2.6)
MCHC RBC-ENTMCNC: 32 PG — SIGNIFICANT CHANGE UP (ref 27–34)
MCHC RBC-ENTMCNC: 32.5 GM/DL — SIGNIFICANT CHANGE UP (ref 32–36)
MCHC RBC-ENTMCNC: 32.9 PG — SIGNIFICANT CHANGE UP (ref 27–34)
MCHC RBC-ENTMCNC: 33.8 GM/DL — SIGNIFICANT CHANGE UP (ref 32–36)
MCV RBC AUTO: 97.1 FL — SIGNIFICANT CHANGE UP (ref 80–100)
MCV RBC AUTO: 98.7 FL — SIGNIFICANT CHANGE UP (ref 80–100)
NT-PROBNP SERPL-MCNC: 32 PG/ML
PCO2 BLDV: 52 MMHG — HIGH (ref 35–50)
PCO2 BLDV: 56 MMHG — HIGH (ref 35–50)
PH BLDV: 7.2 — LOW (ref 7.35–7.45)
PH BLDV: 7.21 — LOW (ref 7.35–7.45)
PHOSPHATE SERPL-MCNC: 2.3 MG/DL — LOW (ref 2.5–4.5)
PHOSPHATE SERPL-MCNC: 3.1 MG/DL — SIGNIFICANT CHANGE UP (ref 2.5–4.5)
PHOSPHATE SERPL-MCNC: 3.7 MG/DL — SIGNIFICANT CHANGE UP (ref 2.5–4.5)
PHOSPHATE SERPL-MCNC: 4.6 MG/DL — HIGH (ref 2.5–4.5)
PHOSPHATE SERPL-MCNC: 5.3 MG/DL — HIGH (ref 2.5–4.5)
PLATELET # BLD AUTO: 151 K/UL — SIGNIFICANT CHANGE UP (ref 150–400)
PLATELET # BLD AUTO: 179 K/UL — SIGNIFICANT CHANGE UP (ref 150–400)
PO2 BLDV: 59 MMHG — HIGH (ref 25–45)
PO2 BLDV: 60 MMHG — HIGH (ref 25–45)
POTASSIUM SERPL-MCNC: 4.7 MMOL/L — SIGNIFICANT CHANGE UP (ref 3.5–5.3)
POTASSIUM SERPL-MCNC: 5.1 MMOL/L — SIGNIFICANT CHANGE UP (ref 3.5–5.3)
POTASSIUM SERPL-MCNC: 5.3 MMOL/L — SIGNIFICANT CHANGE UP (ref 3.5–5.3)
POTASSIUM SERPL-MCNC: 5.3 MMOL/L — SIGNIFICANT CHANGE UP (ref 3.5–5.3)
POTASSIUM SERPL-MCNC: 5.8 MMOL/L — HIGH (ref 3.5–5.3)
POTASSIUM SERPL-SCNC: 4 MMOL/L
POTASSIUM SERPL-SCNC: 4.7 MMOL/L — SIGNIFICANT CHANGE UP (ref 3.5–5.3)
POTASSIUM SERPL-SCNC: 5.1 MMOL/L — SIGNIFICANT CHANGE UP (ref 3.5–5.3)
POTASSIUM SERPL-SCNC: 5.3 MMOL/L — SIGNIFICANT CHANGE UP (ref 3.5–5.3)
POTASSIUM SERPL-SCNC: 5.3 MMOL/L — SIGNIFICANT CHANGE UP (ref 3.5–5.3)
POTASSIUM SERPL-SCNC: 5.8 MMOL/L — HIGH (ref 3.5–5.3)
PROCALCITONIN SERPL-MCNC: >100 — SIGNIFICANT CHANGE UP
PROT SERPL-MCNC: 4.6 G/DL — LOW (ref 6–8.3)
PROT SERPL-MCNC: 6.2 G/DL
PROTHROM AB SERPL-ACNC: 25.8 SEC — HIGH (ref 10–12.9)
PROTHROM AB SERPL-ACNC: 32.6 SEC — HIGH (ref 10–12.9)
RBC # BLD: 3.54 M/UL — LOW (ref 3.8–5.2)
RBC # BLD: 3.68 M/UL — LOW (ref 3.8–5.2)
RBC # FLD: 14.1 % — SIGNIFICANT CHANGE UP (ref 10.3–14.5)
RBC # FLD: 14.2 % — SIGNIFICANT CHANGE UP (ref 10.3–14.5)
RH IG SCN BLD-IMP: POSITIVE — SIGNIFICANT CHANGE UP
SAO2 % BLDV: 83 % — SIGNIFICANT CHANGE UP (ref 67–88)
SAO2 % BLDV: 86 % — SIGNIFICANT CHANGE UP (ref 67–88)
SODIUM SERPL-SCNC: 142 MMOL/L
SODIUM SERPL-SCNC: 143 MMOL/L — SIGNIFICANT CHANGE UP (ref 135–145)
SODIUM SERPL-SCNC: 143 MMOL/L — SIGNIFICANT CHANGE UP (ref 135–145)
SODIUM SERPL-SCNC: 144 MMOL/L — SIGNIFICANT CHANGE UP (ref 135–145)
SODIUM SERPL-SCNC: 145 MMOL/L — SIGNIFICANT CHANGE UP (ref 135–145)
SODIUM SERPL-SCNC: 146 MMOL/L — HIGH (ref 135–145)
SPECIMEN SOURCE: SIGNIFICANT CHANGE UP
TROPONIN T, HIGH SENSITIVITY RESULT: 71 NG/L — HIGH (ref 0–51)
WBC # BLD: 2.1 K/UL — LOW (ref 3.8–10.5)
WBC # BLD: 5.2 K/UL — SIGNIFICANT CHANGE UP (ref 3.8–10.5)
WBC # FLD AUTO: 2.1 K/UL — LOW (ref 3.8–10.5)
WBC # FLD AUTO: 5.2 K/UL — SIGNIFICANT CHANGE UP (ref 3.8–10.5)

## 2019-07-02 PROCEDURE — 99223 1ST HOSP IP/OBS HIGH 75: CPT

## 2019-07-02 PROCEDURE — 88307 TISSUE EXAM BY PATHOLOGIST: CPT | Mod: 26

## 2019-07-02 PROCEDURE — 71045 X-RAY EXAM CHEST 1 VIEW: CPT | Mod: 26

## 2019-07-02 RX ORDER — VASOPRESSIN 20 [USP'U]/ML
0.03 INJECTION INTRAVENOUS
Qty: 50 | Refills: 0 | Status: DISCONTINUED | OUTPATIENT
Start: 2019-07-02 | End: 2019-07-03

## 2019-07-02 RX ORDER — HEPARIN SODIUM 5000 [USP'U]/ML
5000 INJECTION INTRAVENOUS; SUBCUTANEOUS EVERY 8 HOURS
Refills: 0 | Status: DISCONTINUED | OUTPATIENT
Start: 2019-07-02 | End: 2019-07-02

## 2019-07-02 RX ORDER — INSULIN HUMAN 100 [IU]/ML
1 INJECTION, SOLUTION SUBCUTANEOUS
Qty: 100 | Refills: 0 | Status: DISCONTINUED | OUTPATIENT
Start: 2019-07-02 | End: 2019-07-03

## 2019-07-02 RX ORDER — HYDROMORPHONE HYDROCHLORIDE 2 MG/ML
1 INJECTION INTRAMUSCULAR; INTRAVENOUS; SUBCUTANEOUS ONCE
Refills: 0 | Status: DISCONTINUED | OUTPATIENT
Start: 2019-07-02 | End: 2019-07-02

## 2019-07-02 RX ORDER — PROTHROMBIN COMPLEX CONCENTRATE (HUMAN) 25.5; 16.5; 24; 22; 22; 26 [IU]/ML; [IU]/ML; [IU]/ML; [IU]/ML; [IU]/ML; [IU]/ML
4000 POWDER, FOR SOLUTION INTRAVENOUS ONCE
Refills: 0 | Status: DISCONTINUED | OUTPATIENT
Start: 2019-07-02 | End: 2019-07-02

## 2019-07-02 RX ORDER — DEXTROSE 50 % IN WATER 50 %
25 SYRINGE (ML) INTRAVENOUS
Refills: 0 | Status: DISCONTINUED | OUTPATIENT
Start: 2019-07-02 | End: 2019-07-03

## 2019-07-02 RX ORDER — SODIUM CHLORIDE 9 MG/ML
1000 INJECTION, SOLUTION INTRAVENOUS
Refills: 0 | Status: DISCONTINUED | OUTPATIENT
Start: 2019-07-02 | End: 2019-07-03

## 2019-07-02 RX ORDER — CHLORHEXIDINE GLUCONATE 213 G/1000ML
15 SOLUTION TOPICAL
Refills: 0 | Status: CANCELLED | OUTPATIENT
Start: 2019-07-02 | End: 2019-07-09

## 2019-07-02 RX ORDER — DEXTROSE 50 % IN WATER 50 %
50 SYRINGE (ML) INTRAVENOUS ONCE
Refills: 0 | Status: DISCONTINUED | OUTPATIENT
Start: 2019-07-02 | End: 2019-07-02

## 2019-07-02 RX ORDER — IPRATROPIUM/ALBUTEROL SULFATE 18-103MCG
3 AEROSOL WITH ADAPTER (GRAM) INHALATION EVERY 6 HOURS
Refills: 0 | Status: DISCONTINUED | OUTPATIENT
Start: 2019-07-02 | End: 2019-07-09

## 2019-07-02 RX ORDER — ASCORBIC ACID 60 MG
1500 TABLET,CHEWABLE ORAL EVERY 6 HOURS
Refills: 0 | Status: DISCONTINUED | OUTPATIENT
Start: 2019-07-02 | End: 2019-07-07

## 2019-07-02 RX ORDER — PROTHROMBIN COMPLEX CONCENTRATE (HUMAN) 25.5; 16.5; 24; 22; 22; 26 [IU]/ML; [IU]/ML; [IU]/ML; [IU]/ML; [IU]/ML; [IU]/ML
2000 POWDER, FOR SOLUTION INTRAVENOUS ONCE
Refills: 0 | Status: COMPLETED | OUTPATIENT
Start: 2019-07-02 | End: 2019-07-02

## 2019-07-02 RX ORDER — SODIUM CHLORIDE 9 MG/ML
500 INJECTION, SOLUTION INTRAVENOUS ONCE
Refills: 0 | Status: COMPLETED | OUTPATIENT
Start: 2019-07-02 | End: 2019-07-02

## 2019-07-02 RX ORDER — CHLORHEXIDINE GLUCONATE 213 G/1000ML
15 SOLUTION TOPICAL
Refills: 0 | Status: DISCONTINUED | OUTPATIENT
Start: 2019-07-02 | End: 2019-07-08

## 2019-07-02 RX ORDER — INSULIN LISPRO 100/ML
VIAL (ML) SUBCUTANEOUS EVERY 4 HOURS
Refills: 0 | Status: DISCONTINUED | OUTPATIENT
Start: 2019-07-02 | End: 2019-07-02

## 2019-07-02 RX ORDER — PANTOPRAZOLE SODIUM 20 MG/1
40 TABLET, DELAYED RELEASE ORAL EVERY 24 HOURS
Refills: 0 | Status: DISCONTINUED | OUTPATIENT
Start: 2019-07-02 | End: 2019-07-09

## 2019-07-02 RX ORDER — SODIUM CHLORIDE 9 MG/ML
1000 INJECTION, SOLUTION INTRAVENOUS ONCE
Refills: 0 | Status: COMPLETED | OUTPATIENT
Start: 2019-07-02 | End: 2019-07-02

## 2019-07-02 RX ORDER — HYDROMORPHONE HYDROCHLORIDE 2 MG/ML
0.5 INJECTION INTRAMUSCULAR; INTRAVENOUS; SUBCUTANEOUS EVERY 4 HOURS
Refills: 0 | Status: DISCONTINUED | OUTPATIENT
Start: 2019-07-02 | End: 2019-07-05

## 2019-07-02 RX ORDER — HYDROCORTISONE 20 MG
50 TABLET ORAL EVERY 6 HOURS
Refills: 0 | Status: DISCONTINUED | OUTPATIENT
Start: 2019-07-02 | End: 2019-07-04

## 2019-07-02 RX ORDER — FENTANYL CITRATE 50 UG/ML
1 INJECTION INTRAVENOUS
Qty: 5000 | Refills: 0 | Status: DISCONTINUED | OUTPATIENT
Start: 2019-07-02 | End: 2019-07-05

## 2019-07-02 RX ORDER — METRONIDAZOLE 500 MG
500 TABLET ORAL EVERY 8 HOURS
Refills: 0 | Status: DISCONTINUED | OUTPATIENT
Start: 2019-07-02 | End: 2019-07-09

## 2019-07-02 RX ORDER — NOREPINEPHRINE BITARTRATE/D5W 8 MG/250ML
0.4 PLASTIC BAG, INJECTION (ML) INTRAVENOUS
Qty: 16 | Refills: 0 | Status: DISCONTINUED | OUTPATIENT
Start: 2019-07-02 | End: 2019-07-03

## 2019-07-02 RX ORDER — CALCIUM GLUCONATE 100 MG/ML
2 VIAL (ML) INTRAVENOUS ONCE
Refills: 0 | Status: COMPLETED | OUTPATIENT
Start: 2019-07-02 | End: 2019-07-02

## 2019-07-02 RX ORDER — SODIUM CHLORIDE 9 MG/ML
1000 INJECTION, SOLUTION INTRAVENOUS
Refills: 0 | Status: DISCONTINUED | OUTPATIENT
Start: 2019-07-02 | End: 2019-07-02

## 2019-07-02 RX ORDER — BUDESONIDE, MICRONIZED 100 %
0.5 POWDER (GRAM) MISCELLANEOUS EVERY 12 HOURS
Refills: 0 | Status: DISCONTINUED | OUTPATIENT
Start: 2019-07-02 | End: 2019-07-09

## 2019-07-02 RX ORDER — PHYTONADIONE (VIT K1) 5 MG
10 TABLET ORAL ONCE
Refills: 0 | Status: COMPLETED | OUTPATIENT
Start: 2019-07-02 | End: 2019-07-02

## 2019-07-02 RX ORDER — SODIUM CHLORIDE 9 MG/ML
1000 INJECTION INTRAMUSCULAR; INTRAVENOUS; SUBCUTANEOUS ONCE
Refills: 0 | Status: COMPLETED | OUTPATIENT
Start: 2019-07-02 | End: 2019-07-02

## 2019-07-02 RX ORDER — SODIUM CHLORIDE 9 MG/ML
500 INJECTION INTRAMUSCULAR; INTRAVENOUS; SUBCUTANEOUS ONCE
Refills: 0 | Status: COMPLETED | OUTPATIENT
Start: 2019-07-02 | End: 2019-07-02

## 2019-07-02 RX ORDER — AZTREONAM 2 G
1000 VIAL (EA) INJECTION EVERY 8 HOURS
Refills: 0 | Status: DISCONTINUED | OUTPATIENT
Start: 2019-07-02 | End: 2019-07-04

## 2019-07-02 RX ORDER — MAGNESIUM SULFATE 500 MG/ML
2 VIAL (ML) INJECTION
Refills: 0 | Status: COMPLETED | OUTPATIENT
Start: 2019-07-02 | End: 2019-07-02

## 2019-07-02 RX ORDER — THIAMINE MONONITRATE (VIT B1) 100 MG
200 TABLET ORAL
Refills: 0 | Status: COMPLETED | OUTPATIENT
Start: 2019-07-02 | End: 2019-07-05

## 2019-07-02 RX ORDER — CHLORHEXIDINE GLUCONATE 213 G/1000ML
1 SOLUTION TOPICAL DAILY
Refills: 0 | Status: DISCONTINUED | OUTPATIENT
Start: 2019-07-02 | End: 2019-07-09

## 2019-07-02 RX ORDER — INSULIN HUMAN 100 [IU]/ML
2 INJECTION, SOLUTION SUBCUTANEOUS ONCE
Refills: 0 | Status: COMPLETED | OUTPATIENT
Start: 2019-07-02 | End: 2019-07-02

## 2019-07-02 RX ORDER — INSULIN HUMAN 100 [IU]/ML
10 INJECTION, SOLUTION SUBCUTANEOUS ONCE
Refills: 0 | Status: DISCONTINUED | OUTPATIENT
Start: 2019-07-02 | End: 2019-07-02

## 2019-07-02 RX ADMIN — Medication 2: at 15:46

## 2019-07-02 RX ADMIN — SODIUM CHLORIDE 1000 MILLILITER(S): 9 INJECTION INTRAMUSCULAR; INTRAVENOUS; SUBCUTANEOUS at 10:30

## 2019-07-02 RX ADMIN — FENTANYL CITRATE 4.16 MICROGRAM(S)/KG/HR: 50 INJECTION INTRAVENOUS at 19:40

## 2019-07-02 RX ADMIN — HYDROMORPHONE HYDROCHLORIDE 0.5 MILLIGRAM(S): 2 INJECTION INTRAMUSCULAR; INTRAVENOUS; SUBCUTANEOUS at 20:44

## 2019-07-02 RX ADMIN — Medication 50 MILLIGRAM(S): at 00:32

## 2019-07-02 RX ADMIN — CHLORHEXIDINE GLUCONATE 15 MILLILITER(S): 213 SOLUTION TOPICAL at 21:43

## 2019-07-02 RX ADMIN — Medication 153 MILLIGRAM(S): at 23:18

## 2019-07-02 RX ADMIN — Medication 200 MILLIGRAM(S): at 21:43

## 2019-07-02 RX ADMIN — CHLORHEXIDINE GLUCONATE 15 MILLILITER(S): 213 SOLUTION TOPICAL at 18:40

## 2019-07-02 RX ADMIN — Medication 1000 MILLIGRAM(S): at 23:45

## 2019-07-02 RX ADMIN — Medication 50 GRAM(S): at 06:01

## 2019-07-02 RX ADMIN — HYDROMORPHONE HYDROCHLORIDE 0.5 MILLIGRAM(S): 2 INJECTION INTRAMUSCULAR; INTRAVENOUS; SUBCUTANEOUS at 15:42

## 2019-07-02 RX ADMIN — Medication 50 MILLIGRAM(S): at 15:46

## 2019-07-02 RX ADMIN — Medication 100 MILLIGRAM(S): at 15:42

## 2019-07-02 RX ADMIN — PROTHROMBIN COMPLEX CONCENTRATE (HUMAN) 400 INTERNATIONAL UNIT(S): 25.5; 16.5; 24; 22; 22; 26 POWDER, FOR SOLUTION INTRAVENOUS at 01:17

## 2019-07-02 RX ADMIN — HYDROMORPHONE HYDROCHLORIDE 0.5 MILLIGRAM(S): 2 INJECTION INTRAMUSCULAR; INTRAVENOUS; SUBCUTANEOUS at 16:00

## 2019-07-02 RX ADMIN — SODIUM CHLORIDE 100 MILLILITER(S): 9 INJECTION, SOLUTION INTRAVENOUS at 19:39

## 2019-07-02 RX ADMIN — Medication 20 MILLIGRAM(S): at 00:28

## 2019-07-02 RX ADMIN — Medication 50 MILLIGRAM(S): at 23:18

## 2019-07-02 RX ADMIN — VASOPRESSIN 1.8 UNIT(S)/MIN: 20 INJECTION INTRAVENOUS at 19:40

## 2019-07-02 RX ADMIN — Medication 153 MILLIGRAM(S): at 17:42

## 2019-07-02 RX ADMIN — Medication 100 MILLIGRAM(S): at 00:28

## 2019-07-02 RX ADMIN — HEPARIN SODIUM 5000 UNIT(S): 5000 INJECTION INTRAVENOUS; SUBCUTANEOUS at 06:00

## 2019-07-02 RX ADMIN — SODIUM CHLORIDE 100 MILLILITER(S): 9 INJECTION, SOLUTION INTRAVENOUS at 16:00

## 2019-07-02 RX ADMIN — HYDROMORPHONE HYDROCHLORIDE 1 MILLIGRAM(S): 2 INJECTION INTRAMUSCULAR; INTRAVENOUS; SUBCUTANEOUS at 04:30

## 2019-07-02 RX ADMIN — PANTOPRAZOLE SODIUM 40 MILLIGRAM(S): 20 TABLET, DELAYED RELEASE ORAL at 06:00

## 2019-07-02 RX ADMIN — Medication 153 MILLIGRAM(S): at 11:42

## 2019-07-02 RX ADMIN — INSULIN HUMAN 1 UNIT(S)/HR: 100 INJECTION, SOLUTION SUBCUTANEOUS at 20:11

## 2019-07-02 RX ADMIN — Medication 3 MILLILITER(S): at 06:32

## 2019-07-02 RX ADMIN — HYDROMORPHONE HYDROCHLORIDE 0.5 MILLIGRAM(S): 2 INJECTION INTRAMUSCULAR; INTRAVENOUS; SUBCUTANEOUS at 21:00

## 2019-07-02 RX ADMIN — Medication 100 MILLIGRAM(S): at 08:54

## 2019-07-02 RX ADMIN — VASOPRESSIN 1.8 UNIT(S)/MIN: 20 INJECTION INTRAVENOUS at 06:00

## 2019-07-02 RX ADMIN — Medication 50 MILLIGRAM(S): at 14:49

## 2019-07-02 RX ADMIN — SODIUM CHLORIDE 50 MILLILITER(S): 9 INJECTION, SOLUTION INTRAVENOUS at 06:00

## 2019-07-02 RX ADMIN — SODIUM CHLORIDE 3000 MILLILITER(S): 9 INJECTION, SOLUTION INTRAVENOUS at 16:25

## 2019-07-02 RX ADMIN — Medication 200 GRAM(S): at 07:54

## 2019-07-02 RX ADMIN — Medication 400 MILLIGRAM(S): at 23:01

## 2019-07-02 RX ADMIN — Medication 31.2 MICROGRAM(S)/KG/MIN: at 19:40

## 2019-07-02 RX ADMIN — Medication 3 MILLILITER(S): at 11:15

## 2019-07-02 RX ADMIN — Medication 0.5 MILLIGRAM(S): at 17:12

## 2019-07-02 RX ADMIN — SODIUM CHLORIDE 1500 MILLILITER(S): 9 INJECTION, SOLUTION INTRAVENOUS at 08:30

## 2019-07-02 RX ADMIN — FENTANYL CITRATE 4.16 MICROGRAM(S)/KG/HR: 50 INJECTION INTRAVENOUS at 05:59

## 2019-07-02 RX ADMIN — SODIUM CHLORIDE 100 MILLILITER(S): 9 INJECTION, SOLUTION INTRAVENOUS at 10:31

## 2019-07-02 RX ADMIN — Medication 50 MILLIGRAM(S): at 21:43

## 2019-07-02 RX ADMIN — SODIUM CHLORIDE 4000 MILLILITER(S): 9 INJECTION INTRAMUSCULAR; INTRAVENOUS; SUBCUTANEOUS at 04:30

## 2019-07-02 RX ADMIN — INSULIN HUMAN 2 UNIT(S): 100 INJECTION, SOLUTION SUBCUTANEOUS at 20:12

## 2019-07-02 RX ADMIN — Medication 50 GRAM(S): at 06:05

## 2019-07-02 RX ADMIN — Medication 3 MILLILITER(S): at 17:12

## 2019-07-02 RX ADMIN — SODIUM CHLORIDE 3000 MILLILITER(S): 9 INJECTION, SOLUTION INTRAVENOUS at 13:56

## 2019-07-02 RX ADMIN — Medication 102 MILLIGRAM(S): at 17:10

## 2019-07-02 RX ADMIN — Medication 50 MILLIGRAM(S): at 08:30

## 2019-07-02 RX ADMIN — Medication 0.5 MILLIGRAM(S): at 06:32

## 2019-07-02 RX ADMIN — SODIUM CHLORIDE 33.33 MILLILITER(S): 9 INJECTION, SOLUTION INTRAVENOUS at 00:32

## 2019-07-02 RX ADMIN — CHLORHEXIDINE GLUCONATE 1 APPLICATION(S): 213 SOLUTION TOPICAL at 13:14

## 2019-07-02 RX ADMIN — Medication 31.2 MICROGRAM(S)/KG/MIN: at 05:59

## 2019-07-02 RX ADMIN — Medication 200 MILLIGRAM(S): at 09:52

## 2019-07-02 RX ADMIN — Medication 400 MILLIGRAM(S): at 22:00

## 2019-07-02 RX ADMIN — HYDROMORPHONE HYDROCHLORIDE 1 MILLIGRAM(S): 2 INJECTION INTRAMUSCULAR; INTRAVENOUS; SUBCUTANEOUS at 22:00

## 2019-07-02 RX ADMIN — Medication 31.2 MICROGRAM(S)/KG/MIN: at 23:18

## 2019-07-02 RX ADMIN — FENTANYL CITRATE 4.16 MICROGRAM(S)/KG/HR: 50 INJECTION INTRAVENOUS at 10:31

## 2019-07-02 RX ADMIN — CHLORHEXIDINE GLUCONATE 15 MILLILITER(S): 213 SOLUTION TOPICAL at 06:05

## 2019-07-02 RX ADMIN — CHLORHEXIDINE GLUCONATE 15 MILLILITER(S): 213 SOLUTION TOPICAL at 13:57

## 2019-07-02 RX ADMIN — Medication 200 GRAM(S): at 19:41

## 2019-07-02 RX ADMIN — Medication 50 MILLIGRAM(S): at 08:53

## 2019-07-02 NOTE — DIETITIAN INITIAL EVALUATION ADULT. - NS FNS WEIGHT USED FOR CALC
other (specify)/calorie needs based on dosing wt 83.2Kg (7/02); protein needs based on IBW 52.3Kg, with consideration for BMI>30 and intubation

## 2019-07-02 NOTE — PROGRESS NOTE ADULT - ATTENDING COMMENTS
I have seen and evaluated the patient and discussed the relevant clinical findings and plan with the surgical housestaff and fellow.  Agree with the above documentation with addenda as noted.         Clark Alvarado MD

## 2019-07-02 NOTE — PROCEDURE NOTE - NSINFORMCONSENT_GEN_A_CORE
Benefits, risks, and possible complications of procedure explained to patient/caregiver who verbalized understanding and gave written consent.
Benefits, risks, and possible complications of procedure explained to patient/caregiver who verbalized understanding and gave verbal consent.
Benefits, risks, and possible complications of procedure explained to patient/caregiver who verbalized understanding and gave verbal consent.

## 2019-07-02 NOTE — DIETITIAN INITIAL EVALUATION ADULT. - OTHER INFO
INFORMATION PTA  Diet PTA:  Nutrition status PTA:  Nutrition Supplements PTA:  Food Allergies:  Weight History PTA:  Other Information:    INFORMATION THIS ADMISSION  NGT Output x 24-hours: 150ml  Colostomy Output x 24-hours: 0ml  Abthera Wound VAC output x 24-hours: 100ml  Other  Information:  Therapeutic Diet Education Provided:  Education Handouts Provided: INFORMATION PTA  Diet PTA: unable to assess  Nutrition status PTA: Per prior nutrition assessments, pt previously followed a low sodium, Consistent Carbohydrate diet. Per chart, pt manages DM2 with Humalog sliding scale, Rapaglinide tid prn, and Lanuts 12 units at bedtime; HbA1c 7.2% (6/1/19) noted; no new HbA1c available.  Nutrition Supplements PTA: potassium chloride, vitamin C, cholecalciferol  Food Allergies: NKFA  Weight History PTA: Weight history per chart: 193 pounds (April 2016), 172 pounds (8/24/16), 177 pounds (11/29/17); current dosing wt 183 pounds.  Other Information:    INFORMATION THIS ADMISSION  NGT Output x 24-hours: 150ml  Colostomy Output x 24-hours: 0ml  Abthera Wound VAC output x 24-hours: 100ml

## 2019-07-02 NOTE — PROCEDURE NOTE - NSPROCDETAILS_GEN_ALL_CORE
lumen(s) aspirated and flushed/guidewire recovered/sterile dressing applied/ultrasound guidance/sterile technique, catheter placed
positive blood return obtained via catheter/connected to a pressurized flush line/location identified, draped/prepped, sterile technique used, needle inserted/introduced/sutured in place/all materials/supplies accounted for at end of procedure/ultrasound guidance/hemostasis with direct pressure, dressing applied/Seldinger technique
sutured in place/hemostasis with direct pressure, dressing applied/positive blood return obtained via catheter/location identified, draped/prepped, sterile technique used, needle inserted/introduced/Seldinger technique/ultrasound guidance/connected to a pressurized flush line/all materials/supplies accounted for at end of procedure

## 2019-07-02 NOTE — DIETITIAN INITIAL EVALUATION ADULT. - FACTORS AFF FOOD INTAKE
other (specify)/Pt NPO x 3 days, intubated, sedated; awaiting return of GI function. Pt NPO x 3 days, intubated, sedated; no colostomy function./other (specify)

## 2019-07-02 NOTE — CONSULT NOTE ADULT - SUBJECTIVE AND OBJECTIVE BOX
HPI:  75 yo F with PMHx of HFrEF (EF 35%) s/p CRT-D, asthma (not on BB d/t severity), HTN, HLD, atrial fibrillation on eliquis, who p/w severe, sudden onset L sided abd pain, found to have proximal descending colon/splenic flexure diverticulitis. She was also in hypoxic respiratory failure and received 80 mg total of IV lasix, 125 of solumedrol, duonebs x2 and narcan and transferred to the MICU.   MICU consulted for hypoxic respiratory failure. Upon evaluation, patient's VS , RR 30, O2 sat 100% on 100% FiO2 and /72. Patient's FiO2 decreased to 40% and patient with O2 sat of 100%. Patient admitted to MICU for management of respiratory and heart failure.    Overnight through this morning she was persistently tachycardic with BPs as low as 76/50, MAPs in low 60s. She was bolused with 500cc of crystalloid without improvement in blood pressure. She was then started on phenylephrine. Her lactate was uptrending from 2 to 2.6 to 3 on the arterial blood gas. A repeat CT scan showed "Acute diverticulitis at the splenic flexure with evidence of contained perforation. No drainable regional collection. Increased ascites in the deep pelvis." She was transferred to the SICU for further HD monitoring. Antibiotics were switched to Aztreonam and flagyl. Bedside ultrasound showed IV 1.5 minimally collapsable and echo consistent with euvolemic state.     Patient was taken to the OR overnight and underwent exploratory laparotomy, left hemicolectomy, end colostomy, and ABThera VAC placement. Transferred back to the SICU immediately postop requiring high doses of norepinephrine, vasopressin added later in the morning.  .  PAST MEDICAL & SURGICAL HISTORY:  Vertigo  Atrial flutter  Diverticulitis  Cardiomyopathy  Kidney stone  Cardiac Pacemaker  HTN - Hypertension  Gout  Diabetes  Congestive Heart Failure  Asthma  S/P cholecystectomy  AICD (Automatic Cardioverter/Defibrillator) Present: inserted in Aug, 2008. Due for battery change in 1 month. ( [employer]) . Inserted by Dr Duffy      REVIEW OF SYSTEMS  10 point review of systems completed and are negative unless noted in HPI    MEDICATIONS  (STANDING):  ALBUTerol/ipratropium for Nebulization 3 milliLiter(s) Nebulizer every 6 hours  ascorbic acid IVPB 1500 milliGRAM(s) IV Intermittent every 6 hours  aztreonam  IVPB 1000 milliGRAM(s) IV Intermittent every 8 hours  buDESOnide   0.5 milliGRAM(s) Respule 0.5 milliGRAM(s) Inhalation every 12 hours  chlorhexidine 0.12% Liquid 15 milliLiter(s) Oral Mucosa <User Schedule>  chlorhexidine 2% Cloths 1 Application(s) Topical daily  fentaNYL   Infusion. 1 MICROgram(s)/kG/Hr (4.16 mL/Hr) IV Continuous <Continuous>  hydrocortisone sodium succinate Injectable 50 milliGRAM(s) IV Push every 6 hours  insulin lispro (HumaLOG) corrective regimen sliding scale   SubCutaneous every 4 hours  lactated ringers. 1000 milliLiter(s) (100 mL/Hr) IV Continuous <Continuous>  metroNIDAZOLE  IVPB 500 milliGRAM(s) IV Intermittent every 8 hours  norepinephrine Infusion 0.4 MICROgram(s)/kG/Min (31.2 mL/Hr) IV Continuous <Continuous>  pantoprazole  Injectable 40 milliGRAM(s) IV Push every 24 hours  phytonadione  IVPB 10 milliGRAM(s) IV Intermittent once  thiamine Injectable 200 milliGRAM(s) IV Push <User Schedule>  vasopressin Infusion 0.03 Unit(s)/Min (1.8 mL/Hr) IV Continuous <Continuous>    MEDICATIONS  (PRN):  HYDROmorphone  Injectable 0.5 milliGRAM(s) IV Push every 4 hours PRN breakthrough pain    HOME MEDICATIONS:  Aspirin 81 MG Oral Tablet Delayed Release; TAKE 1 TABLET BY MOUTH DAILY  Atorvastatin Calcium 20 MG Oral Tablet; TAKE 1 TABLET DAILY AS DIRECTED  Cetirizine HCl - 10 MG Oral Tablet; TAKE 1 TABLET DAILY AS DIRECTED  Eliquis 5 MG Oral Tablet; TAKE TABLET Twice daily  Enalapril Maleate 10 MG Oral Tablet; TAKE 1   TABLET TWICE PER DAY  Furosemide 40 MG Oral Tablet; 1 tab twice daily  Lantus SoloStar 100 UNIT/ML SOLN; USE AS DIRECTED  Magnesium Oxide 500 MG Oral Tablet; TAKE 1 TABLET BY MOUTH 3 (THREE) TIMES A  DAY MAX DAILY AMOUNT: 3 TABLE  Montelukast Sodium 10 MG Oral Tablet; TAKE 1 TABLET DAILY  Omeprazole 40 MG Oral Capsule Delayed Release  Potassium Chloride 20 MEQ/15ML (10%) Oral Solution; TAKE 1 TSP Daily as needed  Promethazine-DM 6.25-15 MG/5ML Oral Syrup; Take as directed  Repaglinide 0.5 MG Oral Tablet; TAKE 1 TABLET 3 TIMES DAILY, 15-30 MINUTES  BEFORE MEALS  Spiriva HandiHaler 18 MCG Inhalation Capsule  Spironolactone 25 MG Oral Tablet; Take 1 tablet daily  Symbicort 160-4.5 MCG/ACT Inhalation Aerosol; Take 2 puffs once daily  Ventolin  (90 Base) MCG/ACT Inhalation Aerosol Solution; INHALE 1-2 PUFFS  EVERY 4 TO 6 HOURS AS NEEDED      Allergies    Coreg (Other)  digoxin (Other; Short breath (Mild to Mod))  Lopressor (Short breath)  penicillins (Hives)    Intolerances    metoprolol (Other)  Solu-Medrol (Other (Mild to Mod))      SOCIAL HISTORY:  Denied: History of Alcohol Use (History)  Denied: History of Current Smoker  Denied: History of Drug Use    FAMILY HISTORY:  Family history of brain tumor      Vital Signs Last 24 Hrs  T(C): 36.7 (02 Jul 2019 15:00), Max: 37.3 (01 Jul 2019 19:00)  T(F): 98 (02 Jul 2019 15:00), Max: 99.1 (01 Jul 2019 19:00)  HR: 114 (02 Jul 2019 16:30) (91 - 135)  BP: 110/57 (02 Jul 2019 14:15) (82/50 - 141/65)  BP(mean): 76 (02 Jul 2019 14:15) (58 - 101)  RR: 24 (02 Jul 2019 16:30) (6 - 49)  SpO2: 97% (02 Jul 2019 16:30) (93% - 100%)    PHYSICAL EXAM:    Tele: -120s    Appearance: NAD  HEENT: PERRL  Neck: Difficult to assess JVP, but appears normal  Cardiovascular: Tachycardic, regular. Normal S1 S2, No murmurs/rubs/gallops.  Respiratory: Unlabored, CTA bilaterally  Abdomen: Soft, non-distended, ABThera VAC in place with good seal  Skin: No cyanosis  Neurologic: Intubated and sedated, but arousable to voice  Extremities: No edema    LABS:                        11.3   5.2   )-----------( 151      ( 02 Jul 2019 16:18 )             34.9     07-02    146<H>  |  113<H>  |  35<H>  ----------------------------<  199<H>  5.3   |  17<L>  |  1.62<H>    Ca    8.1<L>      02 Jul 2019 12:16  Phos  3.7     07-02  Mg     2.6     07-02    TPro  7.1  /  Alb  3.6  /  TBili  0.9  /  DBili  x   /  AST  18  /  ALT  14  /  AlkPhos  75  07-01    PT/INR - ( 02 Jul 2019 16:18 )   PT: 32.6 sec;   INR: 2.75 ratio         PTT - ( 02 Jul 2019 16:18 )  PTT:34.5 sec      RADIOLOGY & ADDITIONAL STUDIES:    < from: TTE with Doppler (w/Cont) (03.09.18 @ 15:47) >  Observations:  Mitral Valve: Mitral annular calcification, otherwise  normal mitral valve.  Aortic Valve/Aorta: Aortic valve not well visualized;  appears calcified.  Aortic Root: 3 cm.  Left Atrium: Left atrium not well visualized.  Left Ventricle: Endocardial visualization enhanced with  intravenous injection of echo contrast (Definity).  Despite  the use of echo contrast the visualization endocardium was  still suboptimal. At least, Moderate-severe segmental left  ventricular systolic dysfunction. Unable to calculate EF  accurately The  apical cap are akinetic.  Mild diastolic  dysfunction (Stage I).  Right Heart: A device wire is noted in the right heart. The  right heart  is notwell visualized.  Pericardium/Pleura: Normal pericardium with no pericardial  effusion.  ------------------------------------------------------------------------  Conclusions:  1. Endocardial visualization enhanced with intravenous  injection of echocontrast (Definity).  Despite the use of  echo contrast the visualization endocardium was still  suboptimal. At least, Moderate-severe segmental left  ventricular systolic dysfunction. Unable to calculate EF  accurately The  apical cap are akinetic.  *** Compared with echocardiogram of 4/24/2017, difficult to  compare- LV function similar.    < end of copied text >

## 2019-07-02 NOTE — PRE-ANESTHESIA EVALUATION ADULT - NSANTHPMHFT_GEN_ALL_CORE
75 yo F with PMHx of HFrEF (EF 35%) s/p CRT-D, asthma (not on BB d/t severity), HTN, HLD, atrial fibrillation on eliquis, who p/w severe, sudden onset L sided abd pain, found to have proximal descending colon/splenic flexure acute diverticulitis with evidence of contained perforation, now s/p exploratory laparotomy, left hemicolectomy, end colostomy, and ABThera VAC placement on 7/1. Transferred back to the SICU immediately postop requiring high doses of pressors. Current HD: CVP 5, CO/CI 5.3/2.8, . Septic shock.  with IVF to maintain CVP 8-10 and Weaning vasopressors for goal MAP >65 as toleratwd( norepi and vasopressin); on aztreonam and flagyl; chronic heart failure ( enalapril, lasix and spironolactone on hold); improving DIANNA

## 2019-07-02 NOTE — PROCEDURE NOTE - NSPOSTCAREGUIDE_GEN_A_CORE
Verbal/written post procedure instructions were given to patient/caregiver/Care for catheter as per unit/ICU protocols
Care for catheter as per unit/ICU protocols
Care for catheter as per unit/ICU protocols

## 2019-07-02 NOTE — PROGRESS NOTE ADULT - SUBJECTIVE AND OBJECTIVE BOX
74 F with PMHx of HFrEF s/p AICD, asthma, HTN, HLD, atrial fibrillation on eliquis p/w severe, sudden onset L sided abd pain which started yesterday morning after eating. She endorsed nausea at the time but no vomiting. She did have any fevers, chills, diarrhea or hematochezia.      Interval Events: OR overnight for ex-lap, L hemicolectomy, end colostomy, ABThera VAC placement, transferred to SICU immediately postop      O: Vital Signs  T(C): 36.1 (07-02 @ 07:00), Max: 37.3 (07-01 @ 19:00)  HR: 125 (07-02 @ 10:30) (91 - 135)  BP: 91/59 (07-02 @ 10:30) (79/48 - 141/65)  RR: 24 (07-02 @ 10:30) (6 - 49)  SpO2: 95% (07-02 @ 10:30) (93% - 100%)  07-01-19 @ 07:01  -  07-02-19 @ 07:00  --------------------------------------------------------  IN: 4869.5 mL / OUT: 1035 mL / NET: 3834.5 mL    07-02-19 @ 07:01  -  07-02-19 @ 11:00  --------------------------------------------------------  IN: 773.3 mL / OUT: 500 mL / NET: 273.3 mL      General: sedated on fentanyl, pain control with dilaudid and fentanyl  Resp: intubated  CVS: regular rate and rhythm, euvolemic on norepinephrine   Abdomen: soft, distended, ABThera vac in place with good seal  Extremities: no edema  Skin: warm, dry, appropriate color  : ro in place                           12.1   2.1   )-----------( 179      ( 02 Jul 2019 04:56 )             35.7   07-02    143  |  107  |  38<H>  ----------------------------<  161<H>  5.3   |  18<L>  |  1.80<H>    Ca    8.8      02 Jul 2019 09:15  Phos  4.6     07-02  Mg     3.1     07-02    TPro  7.1  /  Alb  3.6  /  TBili  0.9  /  DBili  x   /  AST  18  /  ALT  14  /  AlkPhos  75  07-01

## 2019-07-02 NOTE — DIETITIAN INITIAL EVALUATION ADULT. - PERTINENT MEDS FT
MEDICATIONS  (STANDING):  ALBUTerol/ipratropium for Nebulization 3 milliLiter(s) Nebulizer every 6 hours  ascorbic acid IVPB 1500 milliGRAM(s) IV Intermittent every 6 hours  aztreonam  IVPB 1000 milliGRAM(s) IV Intermittent every 8 hours  buDESOnide   0.5 milliGRAM(s) Respule 0.5 milliGRAM(s) Inhalation every 12 hours  chlorhexidine 0.12% Liquid 15 milliLiter(s) Oral Mucosa <User Schedule>  chlorhexidine 2% Cloths 1 Application(s) Topical daily  dextrose 5% + lactated ringers. 1000 milliLiter(s) (100 mL/Hr) IV Continuous <Continuous>  fentaNYL   Infusion. 1 MICROgram(s)/kG/Hr (4.16 mL/Hr) IV Continuous <Continuous>  hydrocortisone sodium succinate Injectable 50 milliGRAM(s) IV Push every 6 hours  insulin lispro (HumaLOG) corrective regimen sliding scale   SubCutaneous every 4 hours  metroNIDAZOLE  IVPB 500 milliGRAM(s) IV Intermittent every 8 hours  norepinephrine Infusion 0.4 MICROgram(s)/kG/Min (31.2 mL/Hr) IV Continuous <Continuous>  pantoprazole  Injectable 40 milliGRAM(s) IV Push every 24 hours  thiamine Injectable 200 milliGRAM(s) IV Push <User Schedule>  vasopressin Infusion 0.03 Unit(s)/Min (1.8 mL/Hr) IV Continuous <Continuous>    MEDICATIONS  (PRN):  HYDROmorphone  Injectable 0.5 milliGRAM(s) IV Push every 4 hours PRN breakthrough pain

## 2019-07-02 NOTE — PRE-ANESTHESIA EVALUATION ADULT - NSANTHPMHFT_GEN_ALL_CORE
74 F with PMHx of HFrEF s/p AICD, asthma, HTN, HLD, atrial fibrillation on eliquis p/w severe, sudden onset L sided abd pain which started yesterday morning after eating. She endorsed nausea at the time but no vomiting. She did have any fevers, chills, diarrhea or hematochezia.  abdominal pain and exam findings consistent with peritonitis secondary to acute diverticulitis at the splenic flexure with evidence of contained perforation        CTAP w/ IV contrast showed proximal descending colon/splenic flexure diverticulitis with small amount of free fluid and 3cm low attenuation structure in the right adnexa ?fluid collection ?ovarian cyst. She developed dyspnea and wheezing after the CT scan and became hypoxic. Bedside US int he ED demonestrated a few B lines. She received 80mg total of IV lasix, 125 of solumedrol, duonebs x2 and narcan and she was admitted to MICU for management of respiratory and heart failure.     Overnight through this morning she was persistently tachycardic with BPs as low as 76/50, MAPs in low 60s. She was bolused with 500cc of crystalloid without improvement in blood pressure. She was then started on phenylephrine. Her lactate was uptrending from 2 to 2.6 to 3 on the arterial blood gas. A repeat CT scan showed "Acute diverticulitis at the splenic flexure with evidence of contained perforation. No drainable regional collection. Increased ascites in the deep pelvis." She was transferred to the SICU for further HD monitoring. Antibiotics were switched to Aztreonam and flagyl. Bedside ultrasound showed IV 1.5 minimally collapsable and echo consistent with euvolemic state. 74 F with PMHx of HFrEF s/p AICD, asthma, HTN, HLD, atrial fibrillation on eliquis p/w severe, sudden onset L sided abd pain which started yesterday morning after eating. She endorsed nausea at the time but no vomiting. She did have any fevers, chills, diarrhea or hematochezia.  abdominal pain and exam findings consistent with peritonitis secondary to acute diverticulitis at the splenic flexure with evidence of contained perforation, CTAP w/ IV contrast showed proximal descending colon/splenic flexure diverticulitis with small amount of free fluid and 3cm low attenuation structure in the right adnexa ?fluid collection ?ovarian cyst. She developed dyspnea and wheezing after the CT scan and became hypoxic. Bedside US int he ED demonetrated a few B lines. She received 80mg total of IV lasix, 125 of solumedrol, duonebs x2 and narcan and she was admitted to MICU for management of respiratory and heart failure. Overnight through this morning she was persistently tachycardic with BPs as low as 76/50, MAPs in low 60s. She was bolused with 500cc of crystalloid without improvement in blood pressure. She was then started on phenylephrine. Her lactate was uptrending from 2 to 2.6 to 3 on the arterial blood gas. A repeat CT scan showed "Acute diverticulitis at the splenic flexure with evidence of contained perforation. No drainable regional collection. Increased ascites in the deep pelvis." She was transferred to the SICU for further HD monitoring. Antibiotics were switched to Aztreonam and flagyl. Bedside ultrasound showed IV 1.5 minimally collapsable and echo consistent with euvolemic state.

## 2019-07-02 NOTE — PROGRESS NOTE ADULT - ATTENDING COMMENTS
Patient seen and examined and agree with resident note.    Patient POD #1 s/p Hartmanns procedure with feculent peritonitis.  She remains critically ill in septic shock.    1) Acute respiratory failure s/p trauma- continue mechanical ventilation at this time.  patient with respiratory acidosis and increased TV   CXR reviewed- clear lungs   -patient continues to be in septic shock and is critically ill, no SBT today   Continue oxygen supplementation with goal SpO2> 92%    2) Patient seen and examined and agree with resident note.    Patient POD #1 s/p Hartmanns procedure with feculent peritonitis.  She remains critically ill in septic shock.    1) Acute respiratory failure s/p trauma- continue mechanical ventilation at this time.  patient with respiratory acidosis and increased TV   CXR reviewed- clear lungs   -patient continues to be in septic shock and is critically ill, no SBT today   Continue oxygen supplementation with goal SpO2> 92%    2) Hypotension secondary to septic shock from intraabdominal sepsis  -on levophed and vasopressin, lactate increasing even after fluid resuscitation  -pressors have been decreasing and perfusion is improved.   there is a concern of worsening ischemic bowel with increasing lactate in a setting of normal hepatic function panel  -the ostomy itself appears viable and currently will continue to resuscitate as needed to goal MAP > 65 mmHg    3) Acute kidney insufficiency- improving with increased urine output.   -will continue to monitor    4) Hyperglycemia with IDDM type 2- on insulin sliding scale, will continue to monitor closely  -goal BG < 180

## 2019-07-02 NOTE — CONSULT NOTE ADULT - PROBLEM SELECTOR RECOMMENDATION 3
- Management as above  - Home medications of enalapril and spironolactone currently on hold in setting of vasodilatory shock  - Diuretics on hold, gentle hydration to maintain CVP 8-10 - Management as above  - Home medications of enalapril and spironolactone currently on hold in setting of vasodilatory shock  - Diuretics on hold, IVF to maintain CVP 8-10

## 2019-07-02 NOTE — DIETITIAN INITIAL EVALUATION ADULT. - REASON INDICATOR FOR ASSESSMENT
Nutrition Assessment warranted for length of stay on 8ICU. INCOMPLETE NOTE- IN PROGRESS  Information obtained from: medical record  Per chart: "74F admitted with abdominal pain and exam findings consistent with peritonitis secondary to acute diverticulitis at the splenic flexure with evidence of contained perforation now POD1 s/p Exploratory laparotomy, left hemicolectomy, end colostomy, and ABThera Vac placement - intubated and sedated, requiring vasopressor support." Nutrition Assessment warranted for length of stay on 8ICU.   Information obtained from: medical record, prior RD notes. Pt intubated, sedated.  Per chart: "74F admitted with abdominal pain and exam findings consistent with peritonitis secondary to acute diverticulitis at the splenic flexure with evidence of contained perforation now POD1 s/p Exploratory laparotomy, left hemicolectomy, end colostomy, and ABThera Vac placement - intubated and sedated, requiring vasopressor support."

## 2019-07-02 NOTE — PROGRESS NOTE ADULT - ASSESSMENT
ASSESSMENT:  74F admitted with abdominal pain and exam findings consistent with peritonitis secondary to acute diverticulitis at the splenic flexure with evidence of contained perforation now POD1 s/p Exploratory laparotomy, left hemicolectomy, end colostomy, and ABThera Vac placement - intubated and sedated, requiring vasopressor support    PLAN:  NEURO: Intubated and sedated on fentanyl  - Pain control with dilaudid and fentanyl    RESPIRATORY: intubated, PMHx of Asthma  - F/u AM chest Xray  - F/u serial ABGS  - Wean mechanical ventilation    CARDIOVASCULAR:  Requiring levo and vaso  - Wean vasopressor support as tolerated with goal MAP greater than 65.  - Cautious fluid resuscitation   - Hold home anti-hypertensives  - Trend lactate    GI/NUTRITION: s/p ex lap and L hemicolectomy with end colostomy and ABThera VAC placement  - Plan for RTOR today for Abdominal wash out  - NPO, IVF, OGT      GENITOURINARY/RENAL: Worsening DIANNA   - Renally dose antibiotics  - Trend K+    HEMATOLOGIC: Drop in H/H consistent with postop blood loss anemia  - Patient does NOT accept blood products  - Trend CBCs  - Hold ASA     INFECTIOUS DISEASE: Colonic perforation  - Aztreonam and Flagyl     ENDOCRINE:  - ISS, HgA1C    Shira Miller  SICU

## 2019-07-02 NOTE — CONSULT NOTE ADULT - PROBLEM SELECTOR RECOMMENDATION 9
- Agree with gentle hydration to maintain CVP 8-10  - Wean vasopressors for goal MAP >65  - Trend lactate - Agree with IVF to maintain CVP 8-10  - Wean vasopressors for goal MAP >65  - Trend lactate

## 2019-07-02 NOTE — DIETITIAN INITIAL EVALUATION ADULT. - ENTERAL
If EN is warranted, recommend Vital1.2 initiate at 10ml/hr, increase as tolerated to goal rate 50ml/hour x 24 hours to provide 1200ml formula, 1440cal/day, 90Gm protein/day, 973ml free water; meets 17cal/Kg per dosing wt 83.2Kg and 1.7Gm protein/Kg per IBW 52.3Kg. If EN is warranted, recommend Vital1.2, initiate at 10ml/hr, increase as tolerated to goal rate 50ml/hour x 24 hours to provide 1200ml formula, 1440cal/day, 90Gm protein/day, 973ml free water; meets 17cal/Kg per dosing wt 83.2Kg and 1.7Gm protein/Kg per IBW 52.3Kg.

## 2019-07-02 NOTE — PRE-ANESTHESIA EVALUATION ADULT - NSRADCARDRESULTSFT_GEN_ALL_CORE
TTE 3/2018: Mitral Valve: Mitral annular calcification, otherwise normal mitral valve. Aortic Valve/Aorta: Aortic valve not well visualized; appears calcified. Aortic Root: 3 cm. Left Atrium: Left atrium not well visualized. Left Ventricle: Endocardial visualization enhanced with intravenous injection of echo contrast (Definity).  Despite the use of echo contrast the visualization endocardium was still suboptimal. At least, Moderate-severe segmental left ventricular systolic dysfunction. Unable to calculate EF accurately The  apical cap are akinetic.  Mild diastolic dysfunction (Stage I). Right Heart: A device wire is noted in the right heart. The right heart  is not well visualized. Pericardium/Pleura: Normal pericardium with no pericardial effusion.
TTE 4/2017: Conclusions: 1. Mild mitral annular calcification, otherwise normal mitral valve. Minimal mitral regurgitation. 2. Aortic valve not well visualized. No aortic valve regurgitation seen. 3. Endocardium not well visualized; grossly mild left ventricular systolic dysfunction. The basal inferior, basal inferoseptal, and inferolateral walls appear hypokinetic. Paradoxical septal motion consistent with paced rhythm. Repeat imaing with intravenous echo contrast could be performed if clinically indicated. 4. Mild diastolic dysfunction (Stage I). 5. The right ventricle is not well visualized; grossly normal right ventricular systolic function. A device wire  is noted in the right heart. *** Compared with echocardiogram of 5/1/2016, overall LV systolic function appears improved. LV systolic function was grossly moderate to severely reduced on the prior study.    TTE 3/2018: Mitral Valve: Mitral annular calcification, otherwise normal mitral valve. Aortic Valve/Aorta: Aortic valve not well visualized; appears calcified. Aortic Root: 3 cm. Left Atrium: Left atrium not well visualized. Left Ventricle: Endocardial visualization enhanced with intravenous injection of echo contrast (Definity).  Despite the use of echo contrast the visualization endocardium was still suboptimal. At least, Moderate-severe segmental left ventricular systolic dysfunction. Unable to calculate EF accurately The  apical cap are akinetic.  Mild diastolic dysfunction (Stage I). Right Heart: A device wire is noted in the right heart. The right heart  is not well visualized. Pericardium/Pleura: Normal pericardium with no pericardial effusion.

## 2019-07-02 NOTE — PROGRESS NOTE ADULT - ASSESSMENT
74F admitted with abdominal pain and exam findings consistent with peritonitis secondary to acute diverticulitis at the splenic flexure with evidence of contained perforation now POD1 s/p Exploratory laparotomy, left hemicolectomy, end colostomy, and ABThera Vac placement - intubated and sedated, requiring vasopressor support  - Appreciate care by SICU team  - RTOR tomorrow for abdominal washout

## 2019-07-02 NOTE — PROGRESS NOTE ADULT - SUBJECTIVE AND OBJECTIVE BOX
SICU DAILY PROGRESS NOTE    HISTORY  74 F with PMHx of HFrEF s/p AICD, asthma, HTN, HLD, atrial fibrillation on eliquis p/w severe, sudden onset L sided abd pain which started yesterday morning after eating. She endorsed nausea at the time but no vomiting. She did have any fevers, chills, diarrhea or hematochezia.      CTAP w/ IV contrast showed proximal descending colon/splenic flexure diverticulitis with small amount of free fluid and 3cm low attenuation structure in the right adnexa ?fluid collection ?ovarian cyst. She developed dyspnea and wheezing after the CT scan and became hypoxic. Bedside US int he ED demonestrated a few B lines. She received 80mg total of IV lasix, 125 of solumedrol, duonebs x2 and narcan and she was admitted to MICU for management of respiratory and heart failure.     Overnight through this morning she was persistently tachycardic with BPs as low as 76/50, MAPs in low 60s. She was bolused with 500cc of crystalloid without improvement in blood pressure. She was then started on phenylephrine. Her lactate was uptrending from 2 to 2.6 to 3 on the arterial blood gas. A repeat CT scan showed "Acute diverticulitis at the splenic flexure with evidence of contained perforation. No drainable regional collection. Increased ascites in the deep pelvis." She was transferred to the SICU for further HD monitoring. Antibiotics were switched to Aztreonam and flagyl. Bedside ultrasound showed IV 1.5 minimally collapsable and echo consistent with euvolemic state.     24 HOUR EVENTS:  - Patient was taken to the OR overnight and underwent exploratory laparotomy, left hemicolectomy, end colostomy, and ABThera VAC placement  - Transferred back to the SICU immediately postop requiring high doses of norepinephrine, vasopressin added later in the morning  -     SUBJECTIVE/ROS:  [] A ten-point review of systems was otherwise negative except as noted.  [x] Due to altered mental status/intubation, subjective information were not able to be obtained from the patient. History was obtained, to the extent possible, from review of the chart and collateral sources of information.      NEURO  RASS: -3       Exam: intubated and sedated on fentanyl  Pain control with dilaudid and fentanyl  Meds: fentaNYL   Infusion. 1 MICROgram(s)/kG/Hr IV Continuous <Continuous>  HYDROmorphone  Injectable 0.5 milliGRAM(s) IV Push every 4 hours PRN breakthrough pain    [x] Adequacy of sedation and pain control has been assessed and adjusted      RESPIRATORY  RR: 28 (07-02-19 @ 04:25) (17 - 35)  SpO2: 100% (07-02-19 @ 06:33) (93% - 100%)  Wt(kg): --  Exam: unlabored, clear to auscultation bilaterally  Mechanical Ventilation: Mode: AC/ CMV (Assist Control/ Continuous Mandatory Ventilation), RR (machine): 20, RR (patient): 20, TV (machine): 400, FiO2: 40, PEEP: 5, ITime: 1, MAP: 10, PIP: 26  ABG - ( 02 Jul 2019 04:56 )  pH: 7.21  /  pCO2: 54    /  pO2: 107   / HCO3: 21    / Base Excess: -6.7  /  SaO2: 96      Lactate: x        [N/A] Extubation Readiness Assessed  Meds: ALBUTerol/ipratropium for Nebulization 3 milliLiter(s) Nebulizer every 6 hours  buDESOnide   0.5 milliGRAM(s) Respule 0.5 milliGRAM(s) Inhalation every 12 hours    CARDIOVASCULAR  HR: 100 (07-02-19 @ 06:33) (96 - 135)  BP: 117/62 (07-02-19 @ 00:05) (76/50 - 126/70)  BP(mean): 84 (07-02-19 @ 00:05) (57 - 88)  ABP: 171/115 (07-02-19 @ 04:20) (92/53 - 171/115)  ABP(mean): 116 (07-02-19 @ 04:20) (66 - 116)  Wt(kg): --  CVP(cm H2O): --  VBG - ( 02 Jul 2019 04:56 )  pH: 7.20  /  pCO2: 56    /  pO2: 59    / HCO3: 21    / Base Excess: -6.7  /  SaO2: 83     Lactate: x        Exam: regular rate and rhythm  Cardiac Rhythm: sinus  Perfusion     [x]Adequate   [ ]Inadequate  Mentation   [x]Normal       [ ]Reduced  Extremities  [x]Warm         [ ]Cool  Volume Status [ ]Hypervolemic [x]Euvolemic [ ]Hypovolemic  Meds: norepinephrine Infusion 0.4 MICROgram(s)/kG/Min IV Continuous     GI/NUTRITION  Exam: soft, distended, ABThera VAC in place with good seal  Diet: NPO, OG tube  Meds: pantoprazole  Injectable 40 milliGRAM(s) IV Push every 24 hours      GENITOURINARY  I&O's Detail    06-30 @ 07:01  -  07-01 @ 07:00  --------------------------------------------------------  IN:    IV PiggyBack: 1000 mL    phenylephrine   Infusion: 100 mL  Total IN: 1100 mL    OUT:    Intermittent Catheterization - Urethral: 300 mL    Voided: 200 mL  Total OUT: 500 mL    Total NET: 600 mL      07-01 @ 07:01  -  07-02 @ 06:54  --------------------------------------------------------  IN:    dextrose 5% + lactated ringers.: 100 mL    dextrose 5% + lactated ringers.: 600 mL    fentaNYL Infusion.: 8.4 mL    IV PiggyBack: 700 mL    Lactated Ringers IV Bolus: 1500 mL    Oral Fluid: 50 mL    phenylephrine   Infusion: 754.8 mL    vasopressin Infusion: 1.8 mL  Total IN: 3715 mL    OUT:    Nasoenteral Tube: 150 mL    Ureteral Catheter: 585 mL    VAC (Vacuum Assisted Closure) System: 100 mL  Total OUT: 835 mL    Total NET: 2880 mL    Weight (kg): 83.2 (07-02 @ 00:05)  07-02    145  |  112<H>  |  45<H>  ----------------------------<  132<H>  5.8<H>   |  18<L>  |  1.99<H>    Ca    8.4      02 Jul 2019 04:56  Phos  5.3     07-02  Mg     1.4     07-02    TPro  7.1  /  Alb  3.6  /  TBili  0.9  /  DBili  x   /  AST  18  /  ALT  14  /  AlkPhos  75  07-01    [x] Ching catheter, indication: N/A  Meds: calcium gluconate IVPB 2 Gram(s) IV Intermittent once  dextrose 5% + lactated ringers. 1000 milliLiter(s) IV Continuous <Continuous>      HEMATOLOGIC  Meds: heparin  Injectable 5000 Unit(s) SubCutaneous every 8 hours  Holding home Eliquis    [x] VTE Prophylaxis                        12.1   2.1   )-----------( 179      ( 02 Jul 2019 04:56 )             35.7     PT/INR - ( 02 Jul 2019 04:56 )   PT: 25.8 sec;   INR: 2.19 ratio         PTT - ( 02 Jul 2019 04:56 )  PTT:28.7 sec  Transfusion     [ ] PRBC   [ ] Platelets   [ ] FFP   [ ] Cryoprecipitate      INFECTIOUS DISEASES  WBC Count: 2.1 K/uL (07-02 @ 04:56)    RECENT CULTURES:    Meds: aztreonam  IVPB 1000 milliGRAM(s) IV Intermittent every 8 hours  metroNIDAZOLE  IVPB 500 milliGRAM(s) IV Intermittent every 8 hours        ENDOCRINE  CAPILLARY BLOOD GLUCOSE      POCT Blood Glucose.: 205 mg/dL (01 Jul 2019 17:16)  POCT Blood Glucose.: 202 mg/dL (01 Jul 2019 12:43)    Meds: insulin lispro (HumaLOG) corrective regimen sliding scale   SubCutaneous every 4 hours  methylPREDNISolone sodium succinate Injectable 20 milliGRAM(s) IV Push every 8 hours  vasopressin Infusion 0.03 Unit(s)/Min IV Continuous <Continuous>        ACCESS DEVICES:  [x] Peripheral IV  [x] Central Venous Line	[x] R	[ ] L	[x] IJ	[ ] Fem	[ ] SC	Placed:   [x] Arterial Line		[ ] R	[x] L	[ ] Fem	[x] Rad	[ ] Ax	Placed:   [ ] PICC:					[ ] Mediport  [x] Urinary Catheter, Date Placed: 6/30  [x] Necessity of urinary, arterial, and venous catheters discussed    OTHER MEDICATIONS:  chlorhexidine 0.12% Liquid 15 milliLiter(s) Oral Mucosa <User Schedule>  chlorhexidine 2% Cloths 1 Application(s) Topical daily      CODE STATUS: Health Care Proxy form in paper chart mentions end of life wishes, however it does not provide specific details with regards to resuscitation and intubation, will need to further clarify with the family

## 2019-07-02 NOTE — DIETITIAN INITIAL EVALUATION ADULT. - ADD RECOMMEND
Monitor GI function, vasopressor requirement, and medical course; advance nutrition via tolerated route when medically feasible

## 2019-07-02 NOTE — CONSULT NOTE ADULT - ASSESSMENT
75 yo F with PMHx of HFrEF (EF 35%) s/p CRT-D, asthma (not on BB d/t severity), HTN, HLD, atrial fibrillation on eliquis, who p/w severe, sudden onset L sided abd pain, found to have proximal descending colon/splenic flexure acute diverticulitis with evidence of contained perforation, now s/p exploratory laparotomy, left hemicolectomy, end colostomy, and ABThera VAC placement on 7/1. Transferred back to the SICU immediately postop requiring high doses of pressors. Current HD: CVP 5, CO/CI 5.3/2.8, .

## 2019-07-02 NOTE — PROCEDURE NOTE - NSINDICATIONS_GEN_A_CORE
arterial puncture to obtain ABG's/monitoring purposes/critical patient/blood sampling/cannulation purposes

## 2019-07-02 NOTE — DIETITIAN INITIAL EVALUATION ADULT. - PHYSICAL APPEARANCE
other (specify) Edema: 1+ generalized  Skin: no pressure injuries noted per nursing flowsheet  Nutrition Focused Physical Exam: well nourished/other (specify) Edema: 1+ generalized  Skin: no pressure injuries noted per nursing flowsheet  Nutrition Focused Physical Exam: Unable to perform Nutrition Focused Physical Assessment in current critical condition; RD will follow up as appropriate other (specify)/well nourished/ht: 5 feet 3 inches, admit wt: 183 pounds, BMI: 32.5 Kg/m2, IBW: 115 pounds (+/- 10%), 159% IBW

## 2019-07-02 NOTE — DIETITIAN INITIAL EVALUATION ADULT. - PERTINENT LABORATORY DATA
07-02 @ 12:16: Sodium 146<H>, Potassium 5.3, Chloride 113<H>, Calcium 8.1<L>, Magnesium 2.6, Phosphorus 3.7, BUN 35<H>, Creatinine 1.62<H>, <H>, Alk Phos --, ALT/SGPT --, AST/SGOT --, HbA1c --, Total Protein --, Albumin --, Prealbumin --, Total Bilirubin --, Direct Bilirubin --, Hemoglobin --, Hematocrit --  07-02 @ 09:15: Sodium 143, Potassium 5.3, Chloride 107, Calcium 8.8, Magnesium 3.1<H>, Phosphorus 4.6<H>, BUN 38<H>, Creatinine 1.80<H>, <H>, Alk Phos --, ALT/SGPT --, AST/SGOT --, HbA1c --, Total Protein --, Albumin --, Prealbumin --, Total Bilirubin --, Direct Bilirubin --, Hemoglobin --, Hematocrit --  07-02 @ 04:56: Sodium 145, Potassium 5.8<H>, Chloride 112<H>, Calcium 8.4, Magnesium 1.4<L>, Phosphorus 5.3<H>, BUN 45<H>, Creatinine 1.99<H>, <H>, Alk Phos --, ALT/SGPT --, AST/SGOT --, HbA1c --, Total Protein --, Albumin --, Prealbumin --, Total Bilirubin --, Direct Bilirubin --, Hemoglobin 12.1, Hematocrit 35.7  HbA1c 7.2% (6/1/19), POCT blood glucose x 24 hours: 145mg/dL 07-02 @ 12:16: Sodium 146<H>, Potassium 5.3, Chloride 113<H>, Calcium 8.1<L>, Magnesium 2.6, Phosphorus 3.7, BUN 35<H>, Creatinine 1.62<H>, <H>  07-02 @ 09:15: Sodium 143, Potassium 5.3, Chloride 107, Calcium 8.8, Magnesium 3.1<H>, Phosphorus 4.6<H>, BUN 38<H>, Creatinine 1.80<H>, <H>  07-02 @ 04:56: Sodium 145, Potassium 5.8<H>, Chloride 112<H>, Calcium 8.4, Magnesium 1.4<L>, Phosphorus 5.3<H>, BUN 45<H>, Creatinine 1.99<H>, <H>, Hemoglobin 12.1, Hematocrit 35.7  HbA1c 7.2% (6/1/19), POCT blood glucose x 24 hours: 145mg/dL

## 2019-07-02 NOTE — DIETITIAN INITIAL EVALUATION ADULT. - ENERGY NEEDS
The modified [employer] equation ([employer]) 2010 equation was used to calculator resting energy expenditure: 1528cal/day (18 mark/Kg per dosing wt 83.2Kg)

## 2019-07-03 LAB
ANION GAP SERPL CALC-SCNC: 13 MMOL/L — SIGNIFICANT CHANGE UP (ref 5–17)
ANION GAP SERPL CALC-SCNC: 13 MMOL/L — SIGNIFICANT CHANGE UP (ref 5–17)
APTT BLD: 26.2 SEC — LOW (ref 27.5–36.3)
APTT BLD: 31.5 SEC — SIGNIFICANT CHANGE UP (ref 27.5–36.3)
APTT BLD: 35.2 SEC — SIGNIFICANT CHANGE UP (ref 27.5–36.3)
APTT BLD: 41.2 SEC — HIGH (ref 27.5–36.3)
BUN SERPL-MCNC: 27 MG/DL — HIGH (ref 7–23)
BUN SERPL-MCNC: 29 MG/DL — HIGH (ref 7–23)
CALCIUM SERPL-MCNC: 8 MG/DL — LOW (ref 8.4–10.5)
CALCIUM SERPL-MCNC: 8.3 MG/DL — LOW (ref 8.4–10.5)
CHLORIDE SERPL-SCNC: 109 MMOL/L — HIGH (ref 96–108)
CHLORIDE SERPL-SCNC: 113 MMOL/L — HIGH (ref 96–108)
CO2 SERPL-SCNC: 20 MMOL/L — LOW (ref 22–31)
CO2 SERPL-SCNC: 20 MMOL/L — LOW (ref 22–31)
CREAT SERPL-MCNC: 1.11 MG/DL — SIGNIFICANT CHANGE UP (ref 0.5–1.3)
CREAT SERPL-MCNC: 1.31 MG/DL — HIGH (ref 0.5–1.3)
GAS PNL BLDA: SIGNIFICANT CHANGE UP
GAS PNL BLDV: SIGNIFICANT CHANGE UP
GAS PNL BLDV: SIGNIFICANT CHANGE UP
GLUCOSE BLDC GLUCOMTR-MCNC: 137 MG/DL — HIGH (ref 70–99)
GLUCOSE BLDC GLUCOMTR-MCNC: 171 MG/DL — HIGH (ref 70–99)
GLUCOSE BLDC GLUCOMTR-MCNC: 174 MG/DL — HIGH (ref 70–99)
GLUCOSE BLDC GLUCOMTR-MCNC: 175 MG/DL — HIGH (ref 70–99)
GLUCOSE BLDC GLUCOMTR-MCNC: 181 MG/DL — HIGH (ref 70–99)
GLUCOSE BLDC GLUCOMTR-MCNC: 193 MG/DL — HIGH (ref 70–99)
GLUCOSE BLDC GLUCOMTR-MCNC: 194 MG/DL — HIGH (ref 70–99)
GLUCOSE BLDC GLUCOMTR-MCNC: 196 MG/DL — HIGH (ref 70–99)
GLUCOSE BLDC GLUCOMTR-MCNC: 196 MG/DL — HIGH (ref 70–99)
GLUCOSE BLDC GLUCOMTR-MCNC: 199 MG/DL — HIGH (ref 70–99)
GLUCOSE BLDC GLUCOMTR-MCNC: 219 MG/DL — HIGH (ref 70–99)
GLUCOSE BLDC GLUCOMTR-MCNC: 229 MG/DL — HIGH (ref 70–99)
GLUCOSE BLDC GLUCOMTR-MCNC: 230 MG/DL — HIGH (ref 70–99)
GLUCOSE BLDC GLUCOMTR-MCNC: 235 MG/DL — HIGH (ref 70–99)
GLUCOSE BLDC GLUCOMTR-MCNC: 250 MG/DL — HIGH (ref 70–99)
GLUCOSE BLDC GLUCOMTR-MCNC: 65 MG/DL — LOW (ref 70–99)
GLUCOSE SERPL-MCNC: 223 MG/DL — HIGH (ref 70–99)
GLUCOSE SERPL-MCNC: 65 MG/DL — LOW (ref 70–99)
HCT VFR BLD CALC: 28.7 % — LOW (ref 34.5–45)
HCT VFR BLD CALC: 31.5 % — LOW (ref 34.5–45)
HGB BLD-MCNC: 10.3 G/DL — LOW (ref 11.5–15.5)
HGB BLD-MCNC: 9.8 G/DL — LOW (ref 11.5–15.5)
INR BLD: 1.44 RATIO — HIGH (ref 0.88–1.16)
INR BLD: 1.51 RATIO — HIGH (ref 0.88–1.16)
INR BLD: 2.49 RATIO — HIGH (ref 0.88–1.16)
INR BLD: 2.6 RATIO — HIGH (ref 0.88–1.16)
MAGNESIUM SERPL-MCNC: 2.2 MG/DL — SIGNIFICANT CHANGE UP (ref 1.6–2.6)
MAGNESIUM SERPL-MCNC: 2.2 MG/DL — SIGNIFICANT CHANGE UP (ref 1.6–2.6)
MCHC RBC-ENTMCNC: 31.8 PG — SIGNIFICANT CHANGE UP (ref 27–34)
MCHC RBC-ENTMCNC: 32.6 PG — SIGNIFICANT CHANGE UP (ref 27–34)
MCHC RBC-ENTMCNC: 32.8 GM/DL — SIGNIFICANT CHANGE UP (ref 32–36)
MCHC RBC-ENTMCNC: 34.2 GM/DL — SIGNIFICANT CHANGE UP (ref 32–36)
MCV RBC AUTO: 95.1 FL — SIGNIFICANT CHANGE UP (ref 80–100)
MCV RBC AUTO: 97 FL — SIGNIFICANT CHANGE UP (ref 80–100)
PHOSPHATE SERPL-MCNC: 2 MG/DL — LOW (ref 2.5–4.5)
PHOSPHATE SERPL-MCNC: 2.8 MG/DL — SIGNIFICANT CHANGE UP (ref 2.5–4.5)
PLATELET # BLD AUTO: 124 K/UL — LOW (ref 150–400)
PLATELET # BLD AUTO: 139 K/UL — LOW (ref 150–400)
POTASSIUM SERPL-MCNC: 4.1 MMOL/L — SIGNIFICANT CHANGE UP (ref 3.5–5.3)
POTASSIUM SERPL-MCNC: 4.5 MMOL/L — SIGNIFICANT CHANGE UP (ref 3.5–5.3)
POTASSIUM SERPL-SCNC: 4.1 MMOL/L — SIGNIFICANT CHANGE UP (ref 3.5–5.3)
POTASSIUM SERPL-SCNC: 4.5 MMOL/L — SIGNIFICANT CHANGE UP (ref 3.5–5.3)
PROTHROM AB SERPL-ACNC: 16.6 SEC — HIGH (ref 10–12.9)
PROTHROM AB SERPL-ACNC: 17.4 SEC — HIGH (ref 10–12.9)
PROTHROM AB SERPL-ACNC: 29.4 SEC — HIGH (ref 10–12.9)
PROTHROM AB SERPL-ACNC: 30.8 SEC — HIGH (ref 10–12.9)
RBC # BLD: 3.02 M/UL — LOW (ref 3.8–5.2)
RBC # BLD: 3.25 M/UL — LOW (ref 3.8–5.2)
RBC # FLD: 14.1 % — SIGNIFICANT CHANGE UP (ref 10.3–14.5)
RBC # FLD: 14.2 % — SIGNIFICANT CHANGE UP (ref 10.3–14.5)
SODIUM SERPL-SCNC: 142 MMOL/L — SIGNIFICANT CHANGE UP (ref 135–145)
SODIUM SERPL-SCNC: 146 MMOL/L — HIGH (ref 135–145)
WBC # BLD: 6.2 K/UL — SIGNIFICANT CHANGE UP (ref 3.8–10.5)
WBC # BLD: 8.2 K/UL — SIGNIFICANT CHANGE UP (ref 3.8–10.5)
WBC # FLD AUTO: 6.2 K/UL — SIGNIFICANT CHANGE UP (ref 3.8–10.5)
WBC # FLD AUTO: 8.2 K/UL — SIGNIFICANT CHANGE UP (ref 3.8–10.5)

## 2019-07-03 PROCEDURE — 71045 X-RAY EXAM CHEST 1 VIEW: CPT | Mod: 26,76

## 2019-07-03 PROCEDURE — 93306 TTE W/DOPPLER COMPLETE: CPT | Mod: 26

## 2019-07-03 PROCEDURE — 99233 SBSQ HOSP IP/OBS HIGH 50: CPT

## 2019-07-03 RX ORDER — PROTHROMBIN COMPLEX CONCENTRATE (HUMAN) 25.5; 16.5; 24; 22; 22; 26 [IU]/ML; [IU]/ML; [IU]/ML; [IU]/ML; [IU]/ML; [IU]/ML
1500 POWDER, FOR SOLUTION INTRAVENOUS ONCE
Refills: 0 | Status: DISCONTINUED | OUTPATIENT
Start: 2019-07-03 | End: 2019-07-03

## 2019-07-03 RX ORDER — DEXTROSE 50 % IN WATER 50 %
25 SYRINGE (ML) INTRAVENOUS ONCE
Refills: 0 | Status: DISCONTINUED | OUTPATIENT
Start: 2019-07-03 | End: 2019-07-03

## 2019-07-03 RX ORDER — SODIUM CHLORIDE 9 MG/ML
500 INJECTION, SOLUTION INTRAVENOUS ONCE
Refills: 0 | Status: COMPLETED | OUTPATIENT
Start: 2019-07-03 | End: 2019-07-03

## 2019-07-03 RX ORDER — SODIUM CHLORIDE 9 MG/ML
1000 INJECTION, SOLUTION INTRAVENOUS
Refills: 0 | Status: DISCONTINUED | OUTPATIENT
Start: 2019-07-03 | End: 2019-07-04

## 2019-07-03 RX ORDER — GLUCAGON INJECTION, SOLUTION 0.5 MG/.1ML
1 INJECTION, SOLUTION SUBCUTANEOUS ONCE
Refills: 0 | Status: DISCONTINUED | OUTPATIENT
Start: 2019-07-03 | End: 2019-07-03

## 2019-07-03 RX ORDER — SODIUM CHLORIDE 9 MG/ML
1000 INJECTION, SOLUTION INTRAVENOUS
Refills: 0 | Status: DISCONTINUED | OUTPATIENT
Start: 2019-07-03 | End: 2019-07-03

## 2019-07-03 RX ORDER — PROTHROMBIN COMPLEX CONCENTRATE (HUMAN) 25.5; 16.5; 24; 22; 22; 26 [IU]/ML; [IU]/ML; [IU]/ML; [IU]/ML; [IU]/ML; [IU]/ML
2000 POWDER, FOR SOLUTION INTRAVENOUS ONCE
Refills: 0 | Status: COMPLETED | OUTPATIENT
Start: 2019-07-03 | End: 2019-07-03

## 2019-07-03 RX ORDER — INSULIN LISPRO 100/ML
VIAL (ML) SUBCUTANEOUS EVERY 4 HOURS
Refills: 0 | Status: DISCONTINUED | OUTPATIENT
Start: 2019-07-03 | End: 2019-07-07

## 2019-07-03 RX ORDER — PHYTONADIONE (VIT K1) 5 MG
10 TABLET ORAL ONCE
Refills: 0 | Status: COMPLETED | OUTPATIENT
Start: 2019-07-03 | End: 2019-07-03

## 2019-07-03 RX ORDER — DEXTROSE 50 % IN WATER 50 %
15 SYRINGE (ML) INTRAVENOUS ONCE
Refills: 0 | Status: DISCONTINUED | OUTPATIENT
Start: 2019-07-03 | End: 2019-07-03

## 2019-07-03 RX ORDER — INSULIN LISPRO 100/ML
VIAL (ML) SUBCUTANEOUS EVERY 6 HOURS
Refills: 0 | Status: DISCONTINUED | OUTPATIENT
Start: 2019-07-03 | End: 2019-07-03

## 2019-07-03 RX ORDER — DEXTROSE 50 % IN WATER 50 %
25 SYRINGE (ML) INTRAVENOUS ONCE
Refills: 0 | Status: COMPLETED | OUTPATIENT
Start: 2019-07-03 | End: 2019-07-03

## 2019-07-03 RX ORDER — ACETAMINOPHEN 500 MG
1000 TABLET ORAL ONCE
Refills: 0 | Status: COMPLETED | OUTPATIENT
Start: 2019-07-02 | End: 2019-07-02

## 2019-07-03 RX ORDER — DEXTROSE 50 % IN WATER 50 %
12.5 SYRINGE (ML) INTRAVENOUS ONCE
Refills: 0 | Status: DISCONTINUED | OUTPATIENT
Start: 2019-07-03 | End: 2019-07-03

## 2019-07-03 RX ADMIN — Medication 25 MILLILITER(S): at 14:01

## 2019-07-03 RX ADMIN — Medication 50 MILLIGRAM(S): at 08:10

## 2019-07-03 RX ADMIN — Medication 200 MILLIGRAM(S): at 21:43

## 2019-07-03 RX ADMIN — HYDROMORPHONE HYDROCHLORIDE 0.5 MILLIGRAM(S): 2 INJECTION INTRAMUSCULAR; INTRAVENOUS; SUBCUTANEOUS at 22:00

## 2019-07-03 RX ADMIN — HYDROMORPHONE HYDROCHLORIDE 0.5 MILLIGRAM(S): 2 INJECTION INTRAMUSCULAR; INTRAVENOUS; SUBCUTANEOUS at 21:41

## 2019-07-03 RX ADMIN — Medication 100 MILLIGRAM(S): at 00:12

## 2019-07-03 RX ADMIN — PROTHROMBIN COMPLEX CONCENTRATE (HUMAN) 400 INTERNATIONAL UNIT(S): 25.5; 16.5; 24; 22; 22; 26 POWDER, FOR SOLUTION INTRAVENOUS at 09:33

## 2019-07-03 RX ADMIN — Medication 3 MILLILITER(S): at 00:34

## 2019-07-03 RX ADMIN — SODIUM CHLORIDE 2000 MILLILITER(S): 9 INJECTION, SOLUTION INTRAVENOUS at 03:39

## 2019-07-03 RX ADMIN — Medication 31.2 MICROGRAM(S)/KG/MIN: at 08:20

## 2019-07-03 RX ADMIN — CHLORHEXIDINE GLUCONATE 15 MILLILITER(S): 213 SOLUTION TOPICAL at 17:48

## 2019-07-03 RX ADMIN — Medication 50 MILLIGRAM(S): at 03:39

## 2019-07-03 RX ADMIN — HYDROMORPHONE HYDROCHLORIDE 0.5 MILLIGRAM(S): 2 INJECTION INTRAMUSCULAR; INTRAVENOUS; SUBCUTANEOUS at 13:45

## 2019-07-03 RX ADMIN — VASOPRESSIN 1.8 UNIT(S)/MIN: 20 INJECTION INTRAVENOUS at 03:39

## 2019-07-03 RX ADMIN — Medication 50 MILLIGRAM(S): at 23:22

## 2019-07-03 RX ADMIN — Medication 153 MILLIGRAM(S): at 05:30

## 2019-07-03 RX ADMIN — INSULIN HUMAN 1 UNIT(S)/HR: 100 INJECTION, SOLUTION SUBCUTANEOUS at 08:19

## 2019-07-03 RX ADMIN — CHLORHEXIDINE GLUCONATE 15 MILLILITER(S): 213 SOLUTION TOPICAL at 05:30

## 2019-07-03 RX ADMIN — VASOPRESSIN 1.8 UNIT(S)/MIN: 20 INJECTION INTRAVENOUS at 08:20

## 2019-07-03 RX ADMIN — Medication 153 MILLIGRAM(S): at 14:02

## 2019-07-03 RX ADMIN — CHLORHEXIDINE GLUCONATE 15 MILLILITER(S): 213 SOLUTION TOPICAL at 14:02

## 2019-07-03 RX ADMIN — Medication 83.33 MILLIMOLE(S): at 03:38

## 2019-07-03 RX ADMIN — Medication 102 MILLIGRAM(S): at 03:38

## 2019-07-03 RX ADMIN — Medication 100 MILLIGRAM(S): at 08:10

## 2019-07-03 RX ADMIN — Medication 3 MILLILITER(S): at 17:47

## 2019-07-03 RX ADMIN — CHLORHEXIDINE GLUCONATE 1 APPLICATION(S): 213 SOLUTION TOPICAL at 14:04

## 2019-07-03 RX ADMIN — Medication 2: at 21:43

## 2019-07-03 RX ADMIN — PANTOPRAZOLE SODIUM 40 MILLIGRAM(S): 20 TABLET, DELAYED RELEASE ORAL at 05:30

## 2019-07-03 RX ADMIN — SODIUM CHLORIDE 100 MILLILITER(S): 9 INJECTION, SOLUTION INTRAVENOUS at 19:32

## 2019-07-03 RX ADMIN — Medication 3 MILLILITER(S): at 05:00

## 2019-07-03 RX ADMIN — Medication 200 MILLIGRAM(S): at 09:33

## 2019-07-03 RX ADMIN — HYDROMORPHONE HYDROCHLORIDE 0.5 MILLIGRAM(S): 2 INJECTION INTRAMUSCULAR; INTRAVENOUS; SUBCUTANEOUS at 14:00

## 2019-07-03 RX ADMIN — FENTANYL CITRATE 4.16 MICROGRAM(S)/KG/HR: 50 INJECTION INTRAVENOUS at 19:32

## 2019-07-03 RX ADMIN — Medication 0.5 MILLIGRAM(S): at 05:00

## 2019-07-03 RX ADMIN — Medication 50 MILLIGRAM(S): at 21:42

## 2019-07-03 RX ADMIN — SODIUM CHLORIDE 50 MILLILITER(S): 9 INJECTION, SOLUTION INTRAVENOUS at 17:37

## 2019-07-03 RX ADMIN — Medication 1: at 17:38

## 2019-07-03 RX ADMIN — Medication 0.5 MILLIGRAM(S): at 17:47

## 2019-07-03 RX ADMIN — CHLORHEXIDINE GLUCONATE 15 MILLILITER(S): 213 SOLUTION TOPICAL at 21:42

## 2019-07-03 RX ADMIN — Medication 153 MILLIGRAM(S): at 23:22

## 2019-07-03 RX ADMIN — SODIUM CHLORIDE 100 MILLILITER(S): 9 INJECTION, SOLUTION INTRAVENOUS at 08:20

## 2019-07-03 RX ADMIN — Medication 50 MILLIGRAM(S): at 14:04

## 2019-07-03 RX ADMIN — FENTANYL CITRATE 4.16 MICROGRAM(S)/KG/HR: 50 INJECTION INTRAVENOUS at 08:19

## 2019-07-03 RX ADMIN — Medication 153 MILLIGRAM(S): at 18:34

## 2019-07-03 RX ADMIN — Medication 50 MILLIGRAM(S): at 15:38

## 2019-07-03 RX ADMIN — Medication 100 MILLIGRAM(S): at 16:59

## 2019-07-03 NOTE — PROGRESS NOTE ADULT - SUBJECTIVE AND OBJECTIVE BOX
HISTORY  74 F with PMHx of HFrEF s/p AICD, asthma, HTN, HLD, atrial fibrillation on eliquis p/w severe, sudden onset L sided abd pain which started yesterday morning after eating. She endorsed nausea at the time but no vomiting. She did have any fevers, chills, diarrhea or hematochezia.      CTAP w/ IV contrast showed proximal descending colon/splenic flexure diverticulitis with small amount of free fluid and 3cm low attenuation structure in the right adnexa ?fluid collection ?ovarian cyst. She developed dyspnea and wheezing after the CT scan and became hypoxic. Bedside US int he ED demonestrated a few B lines. She received 80mg total of IV lasix, 125 of solumedrol, duonebs x2 and narcan and she was admitted to MICU for management of respiratory and heart failure.     Overnight through this morning she was persistently tachycardic with BPs as low as 76/50, MAPs in low 60s. She was bolused with 500cc of crystalloid without improvement in blood pressure. She was then started on phenylephrine. Her lactate was uptrending from 2 to 2.6 to 3 on the arterial blood gas. A repeat CT scan showed "Acute diverticulitis at the splenic flexure with evidence of contained perforation. No drainable regional collection. Increased ascites in the deep pelvis." She was transferred to the SICU for further HD monitoring. Antibiotics were switched to Aztreonam and flagyl. Bedside ultrasound showed IV 1.5 minimally collapsable and echo consistent with euvolemic state.     24 HOUR EVENTS:  - Norepinephrine infusion requirements as high as 0.7 so was fluid resuscitated with a total of 3.5 L of crystalloid.  - Started stress dose steroids. Added thiamine and vitamin C.  - Increased D5LR from 50 mL/hr to 100 mL/hr.  - Adjusted from PRVC to volume control.  - Left radial arterial line non-functioning so right radial arterial line placed.  - Elevated PT/INR so given vitamin K 10 mg IV x2 doses.  - Started on insulin infusion for hyperglycemia.    SUBJECTIVE/ROS:  [ ] A ten-point review of systems was otherwise negative except as noted.  [x] Due to altered mental status/intubation, subjective information were not able to be obtained from the patient. History was obtained, to the extent possible, from review of the chart and collateral sources of information.    NEURO  Exam: sedated, easily arousable, no acute distress, no focal deficits, follows commands  Meds:  - fentaNYL   Infusion. 1 MICROgram(s)/kG/Hr IV Continuous <Continuous>  - HYDROmorphone  Injectable 0.5 milliGRAM(s) IV Push every 4 hours PRN breakthrough pain  [x] Adequacy of sedation and pain control has been assessed and adjusted    RESPIRATORY  RR: 24 (07-03-19 @ 07:30) (7 - 38)  SpO2: 99% (07-03-19 @ 07:30) (95% - 100%)  Exam: unlabored, clear to auscultation bilaterally  Mechanical Ventilation: Mode: AC/ CMV (Assist Control/ Continuous Mandatory Ventilation), RR (machine): 24, RR (patient): 24, TV (machine): 450, FiO2: 30, PEEP: 5, ITime: 1, MAP: 11, PC: , PIP: 34  [x] Extubation Readiness Assessed  ABG - ( 03 Jul 2019 06:10 )  pH: 7.41  /  pCO2: 35    /  pO2: 144   / HCO3: 22    / Base Excess: -2.2  /  SaO2: 99    /    Lactate: 3.7  Meds:  - ALBUTerol/ipratropium for Nebulization 3 milliLiter(s) Nebulizer every 6 hours  - buDESOnide   0.5 milliGRAM(s) Respule 0.5 milliGRAM(s) Inhalation every 12 hours        CARDIOVASCULAR  HR: 119 (07-03-19 @ 07:30) (99 - 143)  BP: 110/57 (07-02-19 @ 14:15) (82/50 - 137/73)  BP(mean): 76 (07-02-19 @ 14:15) (58 - 101)  ABP: 133/56 (07-03-19 @ 07:30) (48/18 - 174/72)  ABP(mean): 80 (07-03-19 @ 07:30) (38 - 107)  Wt(kg): --  CVP(cm H2O): --  VBG - ( 03 Jul 2019 02:08 )  pH: 7.40  /  pCO2: 38    /  pO2: 46    / HCO3: 23    / Base Excess: -1.4  /  SaO2: 80     Lactate: 4.0                Exam:  Cardiac Rhythm:  Perfusion     [ ]Adequate   [ ]Inadequate  Mentation   [ ]Normal       [ ]Reduced  Extremities  [ ]Warm         [ ]Cool  Volume Status [ ]Hypervolemic [ ]Euvolemic [ ]Hypovolemic  Meds: norepinephrine Infusion 0.4 MICROgram(s)/kG/Min IV Continuous <Continuous>        GI/NUTRITION  Exam:  Diet:  Meds: pantoprazole  Injectable 40 milliGRAM(s) IV Push every 24 hours      GENITOURINARY  I&O's Detail    07-02 @ 07:01  -  07-03 @ 07:00  --------------------------------------------------------  IN:    dextrose 5% + lactated ringers.: 750 mL    dextrose 5% + lactated ringers.: 1300 mL    fentaNYL Infusion.: 100.8 mL    insulin regular Infusion: 21 mL    IV PiggyBack: 750 mL    lactated ringers.: 200 mL    multiple electrolytes Injection Type 1 Bolus: 1500 mL    norepinephrine Infusion: 645.3 mL    Solution: 416.5 mL    Solution: 250 mL    Solution: 50 mL    vasopressin Infusion: 43.2 mL  Total IN: 6026.8 mL    OUT:    Ureteral Catheter: 2560 mL    VAC (Vacuum Assisted Closure) System: 550 mL  Total OUT: 3110 mL    Total NET: 2916.8 mL        Weight (kg): 83.2 (07-02 @ 22:24)  07-03    142  |  109<H>  |  29<H>  ----------------------------<  223<H>  4.5   |  20<L>  |  1.31<H>    Ca    8.3<L>      03 Jul 2019 02:10  Phos  2.0     07-03  Mg     2.2     07-03    TPro  4.6<L>  /  Alb  2.0<L>  /  TBili  0.5  /  DBili  0.2  /  AST  30  /  ALT  19  /  AlkPhos  31<L>  07-02    [ ] Ching catheter, indication:   Meds: ascorbic acid IVPB 1500 milliGRAM(s) IV Intermittent every 6 hours  dextrose 5% + lactated ringers. 1000 milliLiter(s) IV Continuous <Continuous>  thiamine Injectable 200 milliGRAM(s) IV Push <User Schedule>        HEMATOLOGIC  Meds:   [x] VTE Prophylaxis                        10.3   8.2   )-----------( 139      ( 03 Jul 2019 02:10 )             31.5     PT/INR - ( 03 Jul 2019 06:13 )   PT: 29.4 sec;   INR: 2.49 ratio         PTT - ( 03 Jul 2019 06:13 )  PTT:41.2 sec  Transfusion     [ ] PRBC   [ ] Platelets   [ ] FFP   [ ] Cryoprecipitate      INFECTIOUS DISEASES  T(C): 37.3 (07-03-19 @ 07:00), Max: 37.8 (07-02-19 @ 23:00)  Wt(kg): --  WBC Count: 8.2 K/uL (07-03 @ 02:10)  WBC Count: 5.2 K/uL (07-02 @ 16:18)    Recent Cultures:  Specimen Source: .Blood, 07-01 @ 10:04; Results   No growth to date.; Gram Stain: --; Organism: --  Specimen Source: .Urine, 07-01 @ 10:03; Results   No growth; Gram Stain: --; Organism: --    Meds: aztreonam  IVPB 1000 milliGRAM(s) IV Intermittent every 8 hours  metroNIDAZOLE  IVPB 500 milliGRAM(s) IV Intermittent every 8 hours        ENDOCRINE  Capillary Blood Glucose    Meds: hydrocortisone sodium succinate Injectable 50 milliGRAM(s) IV Push every 6 hours  insulin regular Infusion 1 Unit(s)/Hr IV Continuous <Continuous>  vasopressin Infusion 0.03 Unit(s)/Min IV Continuous <Continuous>        ACCESS DEVICES:  [ ] Peripheral IV  [ ] Central Venous Line	[ ] R	[ ] L	[ ] IJ	[ ] Fem	[ ] SC	Placed:   [ ] Arterial Line		[ ] R	[ ] L	[ ] Fem	[ ] Rad	[ ] Ax	Placed:   [ ] PICC:					[ ] Mediport  [ ] Urinary Catheter, Date Placed:   [ ] Necessity of urinary, arterial, and venous catheters discussed    OTHER MEDICATIONS:  chlorhexidine 0.12% Liquid 15 milliLiter(s) Oral Mucosa <User Schedule>  chlorhexidine 2% Cloths 1 Application(s) Topical daily      CODE STATUS:     IMAGING: HISTORY  74 F with PMHx of HFrEF s/p AICD, asthma, HTN, HLD, atrial fibrillation on eliquis p/w severe, sudden onset L sided abd pain which started yesterday morning after eating. She endorsed nausea at the time but no vomiting. She did have any fevers, chills, diarrhea or hematochezia.      CTAP w/ IV contrast showed proximal descending colon/splenic flexure diverticulitis with small amount of free fluid and 3cm low attenuation structure in the right adnexa ?fluid collection ?ovarian cyst. She developed dyspnea and wheezing after the CT scan and became hypoxic. Bedside US int he ED demonestrated a few B lines. She received 80mg total of IV lasix, 125 of solumedrol, duonebs x2 and narcan and she was admitted to MICU for management of respiratory and heart failure.     Overnight through this morning she was persistently tachycardic with BPs as low as 76/50, MAPs in low 60s. She was bolused with 500cc of crystalloid without improvement in blood pressure. She was then started on phenylephrine. Her lactate was uptrending from 2 to 2.6 to 3 on the arterial blood gas. A repeat CT scan showed "Acute diverticulitis at the splenic flexure with evidence of contained perforation. No drainable regional collection. Increased ascites in the deep pelvis." She was transferred to the SICU for further HD monitoring. Antibiotics were switched to Aztreonam and flagyl. Bedside ultrasound showed IV 1.5 minimally collapsable and echo consistent with euvolemic state.     24 HOUR EVENTS:  - Norepinephrine infusion requirements as high as 0.7 so was fluid resuscitated with a total of 3.5 L of crystalloid.  - Started stress dose steroids. Added thiamine and vitamin C.  - Increased D5LR from 50 mL/hr to 100 mL/hr.  - Adjusted from PRVC to volume control.  - Left radial arterial line non-functioning so right radial arterial line placed.  - Elevated PT/INR so given vitamin K 10 mg IV x2 doses.  - Started on insulin infusion for hyperglycemia.    SUBJECTIVE/ROS:  [ ] A ten-point review of systems was otherwise negative except as noted.  [x] Due to altered mental status/intubation, subjective information were not able to be obtained from the patient. History was obtained, to the extent possible, from review of the chart and collateral sources of information.    NEURO  Exam: sedated, easily arousable, no acute distress, no focal deficits, follows commands  Meds:  - fentaNYL   Infusion. 1 MICROgram(s)/kG/Hr IV Continuous <Continuous>  - HYDROmorphone  Injectable 0.5 milliGRAM(s) IV Push every 4 hours PRN breakthrough pain  [x] Adequacy of sedation and pain control has been assessed and adjusted    RESPIRATORY  RR: 24 (07-03-19 @ 07:30) (7 - 38)  SpO2: 99% (07-03-19 @ 07:30) (95% - 100%)  Exam: unlabored, clear to auscultation bilaterally  Mechanical Ventilation: Mode: AC/ CMV (Assist Control/ Continuous Mandatory Ventilation), RR (machine): 24, RR (patient): 24, TV (machine): 450, FiO2: 30, PEEP: 5, ITime: 1, MAP: 11, PC: , PIP: 34  [x] Extubation Readiness Assessed  ABG - ( 03 Jul 2019 06:10 )  pH: 7.41  /  pCO2: 35    /  pO2: 144   / HCO3: 22    / Base Excess: -2.2  /  SaO2: 99    /    Lactate: 3.7  Meds:  - ALBUTerol/ipratropium for Nebulization 3 milliLiter(s) Nebulizer every 6 hours  - buDESOnide   0.5 milliGRAM(s) Respule 0.5 milliGRAM(s) Inhalation every 12 hours    CARDIOVASCULAR  HR: 119 (07-03-19 @ 07:30) (99 - 143)  BP: 110/57 (07-02-19 @ 14:15) (82/50 - 137/73)  BP(mean): 76 (07-02-19 @ 14:15) (58 - 101)  ABP: 133/56 (07-03-19 @ 07:30) (48/18 - 174/72)  ABP(mean): 80 (07-03-19 @ 07:30) (38 - 107)  VBG - ( 03 Jul 2019 02:08 )  pH: 7.40  /  pCO2: 38    /  pO2: 46    / HCO3: 23    / Base Excess: -1.4  /  SaO2: 80   /   Lactate: 4.0    Exam: regular rate and rhythm, S1S2  Cardiac Rhythm: sinus  Perfusion    [x]Adequate    [ ]Inadequate  Mentation   [x]Normal       [ ]Reduced  Extremities  [x]Warm         [ ]Cool  Volume Status [ ]Hypervolemic [x]Euvolemic [ ]Hypovolemic  Meds: norepinephrine Infusion 0.4 MICROgram(s)/kG/Min IV Continuous <Continuous>    GI/NUTRITION  Exam: softly distended, karmen-incisional tenderness, ostomy pink & viable with no output, ABThera VAC with good suction and minimal serosanguineous drainage  Diet: NPO with OGT to continuous low wall suction  Meds: pantoprazole  Injectable 40 milliGRAM(s) IV Push every 24 hours    GENITOURINARY  I&O's Detail    07-02 @ 07:01  -  07-03 @ 07:00  --------------------------------------------------------  IN:    dextrose 5% + lactated ringers.: 750 mL    dextrose 5% + lactated ringers.: 1300 mL    fentaNYL Infusion.: 100.8 mL    insulin regular Infusion: 21 mL    IV PiggyBack: 750 mL    lactated ringers.: 200 mL    multiple electrolytes Injection Type 1 Bolus: 1500 mL    norepinephrine Infusion: 645.3 mL    Solution: 416.5 mL    Solution: 250 mL    Solution: 50 mL    vasopressin Infusion: 43.2 mL  Total IN: 6026.8 mL    OUT:    Ureteral Catheter: 2560 mL    VAC (Vacuum Assisted Closure) System: 550 mL  Total OUT: 3110 mL    Total NET: 2916.8 mL    142  |  109<H>  |  29<H>  ----------------------------<  223<H>  4.5   |  20<L>  |  1.31<H>    Ca    8.3<L>      03 Jul 2019 02:10  Phos  2.0  Mg     2.2    [x] Ching catheter, indication: urine output monitoring in the critically ill  Meds: ascorbic acid IVPB 1500 milliGRAM(s) IV Intermittent every 6 hours  dextrose 5% + lactated ringers infuse at 100 mL/hr  thiamine Injectable 200 milliGRAM(s) IV Push <User Schedule>    HEMATOLOGIC  Meds: none  [x] VTE Prophylaxis                        10.3   8.2   )-----------( 139      ( 03 Jul 2019 02:10 )             31.5     PT/INR - ( 03 Jul 2019 06:13 )   PT: 29.4 sec;   INR: 2.49 ratio    PTT - ( 03 Jul 2019 06:13 )  PTT:41.2 sec    INFECTIOUS DISEASES  T(C): 37.3 (07-03-19 @ 07:00), Max: 37.8 (07-02-19 @ 23:00)  WBC Count:  - 8.2 K/uL (07-03 @ 02:10)  - 5.2 K/uL (07-02 @ 16:18)  Recent Cultures:  Specimen Source: .Blood, 07-01 @ 10:04; Results   No growth to date.; Gram Stain: --; Organism: --  Specimen Source: .Urine, 07-01 @ 10:03; Results   No growth; Gram Stain: --; Organism: --  Meds:   - aztreonam  IVPB 1000 milliGRAM(s) IV Intermittent every 8 hours  - metroNIDAZOLE  IVPB 500 milliGRAM(s) IV Intermittent every 8 hours    ENDOCRINE  Capillary Blood Glucose:  CAPILLARY BLOOD GLUCOSE    POCT Blood Glucose.: 250 mg/dL (03 Jul 2019 06:55)  POCT Blood Glucose.: 230 mg/dL (03 Jul 2019 06:03)  POCT Blood Glucose.: 196 mg/dL (03 Jul 2019 05:03)  POCT Blood Glucose.: 196 mg/dL (03 Jul 2019 04:02)  POCT Blood Glucose.: 194 mg/dL (03 Jul 2019 03:05)  POCT Blood Glucose.: 219 mg/dL (03 Jul 2019 02:03)  POCT Blood Glucose.: 175 mg/dL (03 Jul 2019 01:04)  POCT Blood Glucose.: 199 mg/dL (03 Jul 2019 00:01)  POCT Blood Glucose.: 191 mg/dL (02 Jul 2019 23:07)  POCT Blood Glucose.: 199 mg/dL (02 Jul 2019 22:05)  POCT Blood Glucose.: 196 mg/dL (02 Jul 2019 21:00)  POCT Blood Glucose.: 211 mg/dL (02 Jul 2019 20:01)  POCT Blood Glucose.: 224 mg/dL (02 Jul 2019 18:09)  POCT Blood Glucose.: 205 mg/dL (02 Jul 2019 15:38)  POCT Blood Glucose.: 145 mg/dL (02 Jul 2019 10:37)    Meds: hydrocortisone sodium succinate Injectable 50 milliGRAM(s) IV Push every 6 hours  insulin regular Infusion 1 Unit(s)/Hr IV Continuous <Continuous>  vasopressin Infusion 0.03 Unit(s)/Min IV Continuous <Continuous>    ACCESS DEVICES:  [x] Peripheral IV  [x] Central Venous Line	[ ] R	[ ] L	[ ] IJ	[ ] Fem	[ ] SC	Placed:   [x] Arterial Line		[ ] R	[ ] L	[ ] Fem	[ ] Rad	[ ] Ax	Placed:   [ ] PICC:					[ ] Mediport  [x] Urinary Catheter, Date Placed:   [x] Necessity of urinary, arterial, and venous catheters discussed    OTHER MEDICATIONS:  chlorhexidine 0.12% Liquid 15 milliLiter(s) Oral Mucosa <User Schedule>  chlorhexidine 2% Cloths 1 Application(s) Topical daily    CODE STATUS: Full code    IMAGING:

## 2019-07-03 NOTE — PROGRESS NOTE ADULT - ASSESSMENT
ASSESSMENT:  74F admitted with abdominal pain and exam findings consistent with peritonitis secondary to acute diverticulitis at the splenic flexure with evidence of contained perforation now POD1 s/p Exploratory laparotomy, left hemicolectomy, end colostomy, and ABThera Vac placement - intubated and sedated, requiring vasopressor support    PLAN:  NEURO: Intubated and sedated on fentanyl  - Pain control with dilaudid and fentanyl    RESPIRATORY: intubated, PMHx of Asthma  - F/u AM chest Xray  - F/u serial ABGS  - Wean mechanical ventilation    CARDIOVASCULAR:  Requiring levo and vaso  - Wean vasopressor support as tolerated with goal MAP greater than 65.  - Cautious fluid resuscitation   - Hold home anti-hypertensives  - Trend lactate    GI/NUTRITION: s/p ex lap and L hemicolectomy with end colostomy and ABThera VAC placement  - Plan for RTOR today for Abdominal wash out  - NPO, IVF, OGT      GENITOURINARY/RENAL: DIANNA (improving)  - Renally dose antibiotics  - Trend K+    HEMATOLOGIC: Drop in H/H consistent with postop blood loss anemia  - Patient does NOT accept blood products  - Trend CBCs  - Hold ASA  - Elevated INR. Did not respond to vitamin K but will give KCentra prior to OR    INFECTIOUS DISEASE: Colonic perforation  - Aztreonam and Flagyl     ENDOCRINE:  - ISS, HgA1C

## 2019-07-03 NOTE — PHYSICAL EXAM
[General Appearance - Well Developed] : well developed [Normal Appearance] : normal appearance [Well Groomed] : well groomed [General Appearance - Well Nourished] : well nourished [No Deformities] : no deformities [General Appearance - In No Acute Distress] : no acute distress [Normal Conjunctiva] : the conjunctiva exhibited no abnormalities [Normal Oral Mucosa] : normal oral mucosa [No Oral Cyanosis] : no oral cyanosis [] : no respiratory distress [Respiration, Rhythm And Depth] : normal respiratory rhythm and effort [Heart Rate And Rhythm] : heart rate and rhythm were normal [Heart Sounds] : normal S1 and S2 [Arterial Pulses Normal] : the arterial pulses were normal [Bowel Sounds] : normal bowel sounds [Abdomen Soft] : soft [Nail Clubbing] : no clubbing of the fingernails [Cyanosis, Localized] : no localized cyanosis [Skin Color & Pigmentation] : normal skin color and pigmentation [Skin Turgor] : normal skin turgor [No Venous Stasis] : no venous stasis [Oriented To Time, Place, And Person] : oriented to person, place, and time [Affect] : the affect was normal [Mood] : the mood was normal [FreeTextEntry1] : slow gait, using wheelchair

## 2019-07-03 NOTE — PROGRESS NOTE ADULT - ATTENDING COMMENTS
I have seen and evaluated the patient and discussed the relevant clinical findings and plan with the surgical housestaff and fellow.  Agree with the above documentation with addenda as noted.     Lactate clearing  Stable pressors  Abdomen soft, vac in place, ostomy no function    For OR today for second look    Clark Alvarado MD

## 2019-07-03 NOTE — ASSESSMENT
[FreeTextEntry1] : Ms. Dolan is a 74 year old woman with heart failure with mildly reduced ejection fraction, possibly related to hypertension. She is ACC/AHA stage C with NYHA class III symptoms and mildly volume overloaded.

## 2019-07-03 NOTE — PROGRESS NOTE ADULT - SUBJECTIVE AND OBJECTIVE BOX
Interval Events: Started on insulin gtt o/n for hyperglycemia.    S: Patient doing well, denies fevers, chills, nausea, emesis, chest pain, SOB.    O: Vital Signs  T(C): 36.9 (07-03 @ 03:00), Max: 37.8 (07-02 @ 23:00)  HR: 114 (07-03 @ 04:45) (91 - 143)  BP: 110/57 (07-02 @ 14:15) (82/50 - 137/73)  RR: 24 (07-03 @ 04:45) (6 - 38)  SpO2: 100% (07-03 @ 04:45) (95% - 100%)  07-01-19 @ 07:01  -  07-02-19 @ 07:00  --------------------------------------------------------  IN: 4869.5 mL / OUT: 1035 mL / NET: 3834.5 mL    07-02-19 @ 07:01  -  07-03-19 @ 04:53  --------------------------------------------------------  IN: 5178.8 mL / OUT: 2715 mL / NET: 2463.8 mL      General: sedated on fentanyl, pain control with dilaudid and fentanyl  Resp: intubated  CVS: regular rate and rhythm, euvolemic on norepinephrine   Abdomen: soft, distended, ABThera vac in place with good seal  Extremities: no edema  Skin: warm, dry, appropriate color  : ro in place                         10.3   8.2   )-----------( 139      ( 03 Jul 2019 02:10 )             31.5   07-03    142  |  109<H>  |  29<H>  ----------------------------<  223<H>  4.5   |  20<L>  |  1.31<H>    Ca    8.3<L>      03 Jul 2019 02:10  Phos  2.0     07-03  Mg     2.2     07-03    TPro  4.6<L>  /  Alb  2.0<L>  /  TBili  0.5  /  DBili  0.2  /  AST  30  /  ALT  19  /  AlkPhos  31<L>  07-02

## 2019-07-03 NOTE — HISTORY OF PRESENT ILLNESS
[FreeTextEntry1] : Ms Dolan is a 74 year old woman with an extensive medical history notable for asthma which she has had since childhood  and has not been well controlled, diabetes ,diverticulosis and HTN. She received a pacemaker many years ago, which was subsequently upgraded to CRT-D ([employer]) device in 2013.   An echocardiogram done May 1, 2016 revealed an EF of 35%, although repeat echocardiogram in 2017 showed only mild decrease in her LV systolic function.  Her other comorbidities include a history of GI bleed and a history of respiratory failure leaded to tracheostomy and PEG in 2016. \par \par She was last seen in clinic by Dr. Hobson on 10/2018. Since this time, she has traveled down to [place] to stay with her son from 11/2018 and returned end of March 2019. She states she does not know if she will continue traveling back and forth, but would like to avoid the [place]bar in [place] because she states it worsens her asthma. She has also established care with a new PCP, pulmonary, and cardiologist while in [place] (contact information in FYI). \par \par While in [place], she states she had gone to an "urgent care" for wheezing and SOB. She was given an "IV medication," which she does not know what kind of medication it was, but she felt better and was sent home. Since being back in [place], she was admitted at [facility] on 5/31/19 for SOB, wheezing, and asthma exacerbation in the setting of doing home renovations. She was prescribed an oral prednisone taper and discharged home on 6/3/19.\par \par Since this time, she endorses an ongoing productive cough with light yellowish sputum. She states she does not do much activity outside of her home, but endorses occasional dyspnea when walking around her house. She avoids walking up stairs because she gets dyspneic. She has 2 pillow orthopnea, but denies any PND. She endorses LE edema (left always > right), but denies any CP, palpitations, syncope, LH/dizziness, SOB at rest, or abdominal discomfort. She does not weigh herself every day, but states her weight usually ranges from 180-185 lbs. Her appetite is normal. She tends to avoid sodium in her diet, but does not monitor her fluid intake. She has been taking her medications as directed. Her ICD has not discharged.

## 2019-07-03 NOTE — PROGRESS NOTE ADULT - ASSESSMENT
74 F with PMHx of HFrEF s/p AICD, asthma, HTN, HLD, atrial fibrillation on eliquis p/w severe, sudden onset L sided abd pain which started yesterday morning after eating. She endorsed nausea at the time but no vomiting. She did have any fevers, chills, diarrhea or hematochezia.    - OR today for abdominal washout  - Appreciate excellent care by SICU team    Green [phone]

## 2019-07-03 NOTE — PROGRESS NOTE ADULT - ASSESSMENT
75 yo F with PMHx of HFrEF (EF 35%) s/p CRT-D, asthma (not on BB d/t severity), HTN, HLD, atrial fibrillation on eliquis, who p/w severe, sudden onset L sided abd pain, found to have proximal descending colon/splenic flexure acute diverticulitis with evidence of contained perforation, now s/p exploratory laparotomy, left hemicolectomy, end colostomy, and ABThera VAC placement on 7/1. Transferred back to the SICU immediately postop requiring high doses of pressors. S/P abdominal washout this morning. Current HD on levo 0.1, vaso 0.03, fentanyl 1, insulin gtt, and D5+ cc/hr: CVP 11, CO/CI 5.8/3.1,  73 yo F with PMHx of HFrEF (EF 35%) s/p CRT-D, asthma (not on BB d/t severity), HTN, HLD, atrial fibrillation on eliquis, who p/w severe, sudden onset L sided abd pain, found to have proximal descending colon/splenic flexure acute diverticulitis with evidence of contained perforation, now s/p exploratory laparotomy, left hemicolectomy, end colostomy, and ABThera VAC placement on 7/1. Transferred back to the SICU immediately postop requiring high doses of pressors. S/P abdominal washout this morning. Current HD now off pressors, on fentanyl 1 and D5+LR at 100 cc/hr: MAP 78, CVP 10, CO/CI 7.7/4.1,

## 2019-07-03 NOTE — PROGRESS NOTE ADULT - SUBJECTIVE AND OBJECTIVE BOX
Subjective:  - S/p abdominal washout this morning    Medications:  ALBUTerol/ipratropium for Nebulization 3 milliLiter(s) Nebulizer every 6 hours  ascorbic acid IVPB 1500 milliGRAM(s) IV Intermittent every 6 hours  aztreonam  IVPB 1000 milliGRAM(s) IV Intermittent every 8 hours  buDESOnide   0.5 milliGRAM(s) Respule 0.5 milliGRAM(s) Inhalation every 12 hours  chlorhexidine 0.12% Liquid 15 milliLiter(s) Oral Mucosa <User Schedule>  chlorhexidine 2% Cloths 1 Application(s) Topical daily  dextrose 40% Gel 15 Gram(s) Oral once PRN  dextrose 5% + lactated ringers. 1000 milliLiter(s) IV Continuous <Continuous>  dextrose 5%. 1000 milliLiter(s) IV Continuous <Continuous>  dextrose 50% Injectable 12.5 Gram(s) IV Push once  dextrose 50% Injectable 25 Gram(s) IV Push once  dextrose 50% Injectable 25 Gram(s) IV Push once  fentaNYL   Infusion. 1 MICROgram(s)/kG/Hr IV Continuous <Continuous>  glucagon  Injectable 1 milliGRAM(s) IntraMuscular once PRN  hydrocortisone sodium succinate Injectable 50 milliGRAM(s) IV Push every 6 hours  HYDROmorphone  Injectable 0.5 milliGRAM(s) IV Push every 4 hours PRN  insulin lispro (HumaLOG) corrective regimen sliding scale   SubCutaneous every 6 hours  metroNIDAZOLE  IVPB 500 milliGRAM(s) IV Intermittent every 8 hours  pantoprazole  Injectable 40 milliGRAM(s) IV Push every 24 hours  thiamine Injectable 200 milliGRAM(s) IV Push <User Schedule>  vasopressin Infusion 0.03 Unit(s)/Min IV Continuous <Continuous>      Physical Exam:    Vitals:  Vital Signs Last 24 Hours  T(C): 36.7 (19 @ 15:15), Max: 37.8 (19 @ 23:00)  HR: 109 (19 @ 16:25) (105 - 143)  BP: --  RR: 24 (19 @ 16:00) (7 - 29)  SpO2: 100% (19 @ 16:25) (96% - 100%)    Weight in k.3 ( @ 04:14)    I&O's Summary    2019 07:01  -  2019 07:00  --------------------------------------------------------  IN: 6026.8 mL / OUT: 3110 mL / NET: 2916.8 mL    2019 07:01  -  2019 16:49  --------------------------------------------------------  IN: 1067.7 mL / OUT: 385 mL / NET: 682.7 mL    Tele: Afib 100-120s    Appearance: NAD  HEENT: PERRL  Neck: Difficult to assess JVP, but appears normal  Cardiovascular: Tachycardic, irregular. Normal S1 S2, No murmurs/rubs/gallops.  Respiratory: Unlabored, CTA bilaterally  Abdomen: Soft, non-distended, ABThera VAC in place with good seal  Skin: No cyanosis  Neurologic: Intubated and sedated, but arousable to voice  Extremities: No edema    Labs:                        9.8    6.2   )-----------( 124      ( 2019 14:13 )             28.7     07-03    146<H>  |  113<H>  |  27<H>  ----------------------------<  65<L>  4.1   |  20<L>  |  1.11    Ca    8.0<L>      2019 14:13  Phos  2.8     07-03  Mg     2.2     07-03    TPro  4.6<L>  /  Alb  2.0<L>  /  TBili  0.5  /  DBili  0.2  /  AST  30  /  ALT  19  /  AlkPhos  31<L>  07-02    PT/INR - ( 2019 14:13 )   PT: 16.6 sec;   INR: 1.44 ratio         PTT - ( 2019 14:13 )  PTT:26.2 sec      Serum Pro-Brain Natriuretic Peptide: 11 pg/mL ( @ 18:00) Subjective:  - S/p abdominal washout this morning    Medications:  ALBUTerol/ipratropium for Nebulization 3 milliLiter(s) Nebulizer every 6 hours  ascorbic acid IVPB 1500 milliGRAM(s) IV Intermittent every 6 hours  aztreonam  IVPB 1000 milliGRAM(s) IV Intermittent every 8 hours  buDESOnide   0.5 milliGRAM(s) Respule 0.5 milliGRAM(s) Inhalation every 12 hours  chlorhexidine 0.12% Liquid 15 milliLiter(s) Oral Mucosa <User Schedule>  chlorhexidine 2% Cloths 1 Application(s) Topical daily  dextrose 40% Gel 15 Gram(s) Oral once PRN  dextrose 5% + lactated ringers. 1000 milliLiter(s) IV Continuous <Continuous>  dextrose 5%. 1000 milliLiter(s) IV Continuous <Continuous>  dextrose 50% Injectable 12.5 Gram(s) IV Push once  dextrose 50% Injectable 25 Gram(s) IV Push once  dextrose 50% Injectable 25 Gram(s) IV Push once  fentaNYL   Infusion. 1 MICROgram(s)/kG/Hr IV Continuous <Continuous>  glucagon  Injectable 1 milliGRAM(s) IntraMuscular once PRN  hydrocortisone sodium succinate Injectable 50 milliGRAM(s) IV Push every 6 hours  HYDROmorphone  Injectable 0.5 milliGRAM(s) IV Push every 4 hours PRN  insulin lispro (HumaLOG) corrective regimen sliding scale   SubCutaneous every 6 hours  metroNIDAZOLE  IVPB 500 milliGRAM(s) IV Intermittent every 8 hours  pantoprazole  Injectable 40 milliGRAM(s) IV Push every 24 hours  thiamine Injectable 200 milliGRAM(s) IV Push <User Schedule>  vasopressin Infusion 0.03 Unit(s)/Min IV Continuous <Continuous>      Physical Exam:    Vitals:  Vital Signs Last 24 Hours  T(C): 36.7 (19 @ 15:15), Max: 37.8 (19 @ 23:00)  HR: 109 (19 @ 16:25) (105 - 143)  BP: --  RR: 24 (19 @ 16:00) (7 - 29)  SpO2: 100% (19 @ 16:25) (96% - 100%)    Weight in k.3 ( @ 04:14)    I&O's Summary    2019 07:01  -  2019 07:00  --------------------------------------------------------  IN: 6026.8 mL / OUT: 3110 mL / NET: 2916.8 mL    2019 07:01  -  2019 16:49  --------------------------------------------------------  IN: 1067.7 mL / OUT: 385 mL / NET: 682.7 mL    Tele: Afib 100-120s    Appearance: NAD  HEENT: PERRL  Neck: Difficult to assess JVP, but appears normal  Cardiovascular: Tachycardic, irregular. Normal S1 S2, No murmurs/rubs/gallops.  Respiratory: Unlabored, CTA bilaterally  Abdomen: Soft, non-distended, ABThera VAC in place with good seal  Skin: No cyanosis  Neurologic: Intubated and sedated, but arousable to voice  Extremities: 1+ BLE edema    Labs:                        9.8    6.2   )-----------( 124      ( 2019 14:13 )             28.7     07-03    146<H>  |  113<H>  |  27<H>  ----------------------------<  65<L>  4.1   |  20<L>  |  1.11    Ca    8.0<L>      2019 14:13  Phos  2.8     07  Mg     2.2     07-03    TPro  4.6<L>  /  Alb  2.0<L>  /  TBili  0.5  /  DBili  0.2  /  AST  30  /  ALT  19  /  AlkPhos  31<L>  0702    PT/INR - ( 2019 14:13 )   PT: 16.6 sec;   INR: 1.44 ratio         PTT - ( 2019 14:13 )  PTT:26.2 sec      Serum Pro-Brain Natriuretic Peptide: 11 pg/mL ( @ 18:00)

## 2019-07-03 NOTE — PROGRESS NOTE ADULT - ATTENDING COMMENTS
Patient seen and examined and agree with resident note.    Patient POD #2 s/p Hartmanns procedure with feculent peritonitis and patient taken back today for another washout  She remains critically ill in septic shock.    1) Acute respiratory failure s/p trauma- continue mechanical ventilation at this time.  CXR reviewed- clear lungs   -will continue with SBT when clinically appropriate    Continue oxygen supplementation with goal SpO2> 92%    2) Hypotension secondary to septic shock from intraabdominal sepsis- resolving   -weaned off pressors throughout the day  -perfusion is improving  - goal MAP > 65 mmHg    3) Acute kidney insufficiency- improving with increased urine output.   -will continue to monitor    4) Hyperglycemia with IDDM type 2- on insulin sliding scale, will continue to monitor closely  -goal BG < 180    Overall clinical condition is improving.

## 2019-07-03 NOTE — PROGRESS NOTE ADULT - PROBLEM SELECTOR PLAN 1
- Titrate IVF to maintain CVP 8-10  - Wean vasopressors for goal MAP >65  - Trend lactate - Improving, now off pressors  - Titrate IVF to maintain CVP 8-10 and keep I/Os net even  - Trend lactate

## 2019-07-03 NOTE — DISCUSSION/SUMMARY
[FreeTextEntry1] : 1) She will increase her Lasix to 80 mg AM and 40 mg PM x 2 days, then resume 40 mg BID\par 2) Labs today to assess renal function and electrolytes\par 3) Will continue all other medications at this time\par 4) It was discussed at her last clinic visit that we would like to start her on Entresto given her persistent reduction in LVEF, but she is hesitant to do so and would like to speak to Dr. Hobson about it further\par 5) Given her frequent hospitalizations in the past for her heart failure and since she may be traveling between here and [place] throughout the year, I discussed the benefits of CardioMEMS implant, which she would like to consider and discuss with Dr. Hobson also\par 6) She will follow-up with the HF NP in 3 weeks and then with Dr. Hobson

## 2019-07-03 NOTE — PROGRESS NOTE ADULT - PROBLEM SELECTOR PLAN 3
- Management as above  - Home medications of enalapril and spironolactone currently on hold in setting of vasodilatory shock  - Diuretics on hold, titrate IVF to maintain CVP 8-10. - Management as above  - Home medications of enalapril and spironolactone currently on hold in setting of vasodilatory shock  - Diuretics on hold, titrate IVF to maintain CVP 8-10 and keep I/Os net even

## 2019-07-04 ENCOUNTER — TRANSCRIPTION ENCOUNTER (OUTPATIENT)
Age: 74
End: 2019-07-04

## 2019-07-04 LAB
-  AMIKACIN: SIGNIFICANT CHANGE UP
-  AMPICILLIN/SULBACTAM: SIGNIFICANT CHANGE UP
-  AMPICILLIN: SIGNIFICANT CHANGE UP
-  AZTREONAM: SIGNIFICANT CHANGE UP
-  CEFEPIME: SIGNIFICANT CHANGE UP
-  CEFOXITIN: SIGNIFICANT CHANGE UP
-  CEFTRIAXONE: SIGNIFICANT CHANGE UP
-  CIPROFLOXACIN: SIGNIFICANT CHANGE UP
-  ERTAPENEM: SIGNIFICANT CHANGE UP
-  GENTAMICIN: SIGNIFICANT CHANGE UP
-  IMIPENEM: SIGNIFICANT CHANGE UP
-  LEVOFLOXACIN: SIGNIFICANT CHANGE UP
-  MEROPENEM: SIGNIFICANT CHANGE UP
-  PIPERACILLIN/TAZOBACTAM: SIGNIFICANT CHANGE UP
-  TOBRAMYCIN: SIGNIFICANT CHANGE UP
-  TRIMETHOPRIM/SULFAMETHOXAZOLE: SIGNIFICANT CHANGE UP
ALBUMIN SERPL ELPH-MCNC: 1.7 G/DL — LOW (ref 3.3–5)
ALP SERPL-CCNC: 35 U/L — LOW (ref 40–120)
ALT FLD-CCNC: 13 U/L — SIGNIFICANT CHANGE UP (ref 10–45)
ANION GAP SERPL CALC-SCNC: 10 MMOL/L — SIGNIFICANT CHANGE UP (ref 5–17)
ANION GAP SERPL CALC-SCNC: 9 MMOL/L — SIGNIFICANT CHANGE UP (ref 5–17)
APTT BLD: 34.6 SEC — SIGNIFICANT CHANGE UP (ref 27.5–36.3)
AST SERPL-CCNC: 28 U/L — SIGNIFICANT CHANGE UP (ref 10–40)
BILIRUB DIRECT SERPL-MCNC: 0.2 MG/DL — SIGNIFICANT CHANGE UP (ref 0–0.2)
BILIRUB INDIRECT FLD-MCNC: 0.1 MG/DL — LOW (ref 0.2–1)
BILIRUB SERPL-MCNC: 0.3 MG/DL — SIGNIFICANT CHANGE UP (ref 0.2–1.2)
BUN SERPL-MCNC: 27 MG/DL — HIGH (ref 7–23)
BUN SERPL-MCNC: 29 MG/DL — HIGH (ref 7–23)
CALCIUM SERPL-MCNC: 7.2 MG/DL — LOW (ref 8.4–10.5)
CALCIUM SERPL-MCNC: 7.9 MG/DL — LOW (ref 8.4–10.5)
CHLORIDE SERPL-SCNC: 114 MMOL/L — HIGH (ref 96–108)
CHLORIDE SERPL-SCNC: 116 MMOL/L — HIGH (ref 96–108)
CO2 SERPL-SCNC: 24 MMOL/L — SIGNIFICANT CHANGE UP (ref 22–31)
CO2 SERPL-SCNC: 25 MMOL/L — SIGNIFICANT CHANGE UP (ref 22–31)
CREAT SERPL-MCNC: 1.09 MG/DL — SIGNIFICANT CHANGE UP (ref 0.5–1.3)
CREAT SERPL-MCNC: 1.12 MG/DL — SIGNIFICANT CHANGE UP (ref 0.5–1.3)
GAS PNL BLDA: SIGNIFICANT CHANGE UP
GAS PNL BLDV: SIGNIFICANT CHANGE UP
GLUCOSE BLDC GLUCOMTR-MCNC: 160 MG/DL — HIGH (ref 70–99)
GLUCOSE BLDC GLUCOMTR-MCNC: 172 MG/DL — HIGH (ref 70–99)
GLUCOSE BLDC GLUCOMTR-MCNC: 175 MG/DL — HIGH (ref 70–99)
GLUCOSE BLDC GLUCOMTR-MCNC: 185 MG/DL — HIGH (ref 70–99)
GLUCOSE BLDC GLUCOMTR-MCNC: 193 MG/DL — HIGH (ref 70–99)
GLUCOSE SERPL-MCNC: 170 MG/DL — HIGH (ref 70–99)
GLUCOSE SERPL-MCNC: 189 MG/DL — HIGH (ref 70–99)
HCT VFR BLD CALC: 24.3 % — LOW (ref 34.5–45)
HCT VFR BLD CALC: 24.6 % — LOW (ref 34.5–45)
HGB BLD-MCNC: 8 G/DL — LOW (ref 11.5–15.5)
HGB BLD-MCNC: 8 G/DL — LOW (ref 11.5–15.5)
INR BLD: 1.37 RATIO — HIGH (ref 0.88–1.16)
MAGNESIUM SERPL-MCNC: 2.1 MG/DL — SIGNIFICANT CHANGE UP (ref 1.6–2.6)
MAGNESIUM SERPL-MCNC: 2.2 MG/DL — SIGNIFICANT CHANGE UP (ref 1.6–2.6)
MCHC RBC-ENTMCNC: 31.8 PG — SIGNIFICANT CHANGE UP (ref 27–34)
MCHC RBC-ENTMCNC: 31.9 PG — SIGNIFICANT CHANGE UP (ref 27–34)
MCHC RBC-ENTMCNC: 32.7 GM/DL — SIGNIFICANT CHANGE UP (ref 32–36)
MCHC RBC-ENTMCNC: 33.1 GM/DL — SIGNIFICANT CHANGE UP (ref 32–36)
MCV RBC AUTO: 96.5 FL — SIGNIFICANT CHANGE UP (ref 80–100)
MCV RBC AUTO: 97.3 FL — SIGNIFICANT CHANGE UP (ref 80–100)
METHOD TYPE: SIGNIFICANT CHANGE UP
PHOSPHATE SERPL-MCNC: 1.6 MG/DL — LOW (ref 2.5–4.5)
PHOSPHATE SERPL-MCNC: 2.6 MG/DL — SIGNIFICANT CHANGE UP (ref 2.5–4.5)
PLATELET # BLD AUTO: 89 K/UL — LOW (ref 150–400)
PLATELET # BLD AUTO: 97 K/UL — LOW (ref 150–400)
POTASSIUM SERPL-MCNC: 3.8 MMOL/L — SIGNIFICANT CHANGE UP (ref 3.5–5.3)
POTASSIUM SERPL-MCNC: 3.8 MMOL/L — SIGNIFICANT CHANGE UP (ref 3.5–5.3)
POTASSIUM SERPL-SCNC: 3.8 MMOL/L — SIGNIFICANT CHANGE UP (ref 3.5–5.3)
POTASSIUM SERPL-SCNC: 3.8 MMOL/L — SIGNIFICANT CHANGE UP (ref 3.5–5.3)
PREALB SERPL-MCNC: 5 MG/DL — LOW (ref 20–40)
PROT SERPL-MCNC: 4.2 G/DL — LOW (ref 6–8.3)
PROTHROM AB SERPL-ACNC: 15.8 SEC — HIGH (ref 10–12.9)
RBC # BLD: 2.52 M/UL — LOW (ref 3.8–5.2)
RBC # BLD: 2.52 M/UL — LOW (ref 3.8–5.2)
RBC # FLD: 14.3 % — SIGNIFICANT CHANGE UP (ref 10.3–14.5)
RBC # FLD: 14.3 % — SIGNIFICANT CHANGE UP (ref 10.3–14.5)
SODIUM SERPL-SCNC: 148 MMOL/L — HIGH (ref 135–145)
SODIUM SERPL-SCNC: 150 MMOL/L — HIGH (ref 135–145)
WBC # BLD: 11.6 K/UL — HIGH (ref 3.8–10.5)
WBC # BLD: 7.7 K/UL — SIGNIFICANT CHANGE UP (ref 3.8–10.5)
WBC # FLD AUTO: 11.6 K/UL — HIGH (ref 3.8–10.5)
WBC # FLD AUTO: 7.7 K/UL — SIGNIFICANT CHANGE UP (ref 3.8–10.5)

## 2019-07-04 PROCEDURE — 99292 CRITICAL CARE ADDL 30 MIN: CPT

## 2019-07-04 PROCEDURE — 71045 X-RAY EXAM CHEST 1 VIEW: CPT | Mod: 26

## 2019-07-04 PROCEDURE — 99291 CRITICAL CARE FIRST HOUR: CPT

## 2019-07-04 RX ORDER — SODIUM CHLORIDE 9 MG/ML
1000 INJECTION, SOLUTION INTRAVENOUS
Refills: 0 | Status: DISCONTINUED | OUTPATIENT
Start: 2019-07-04 | End: 2019-07-04

## 2019-07-04 RX ORDER — SODIUM CHLORIDE 9 MG/ML
1000 INJECTION, SOLUTION INTRAVENOUS
Refills: 0 | Status: DISCONTINUED | OUTPATIENT
Start: 2019-07-04 | End: 2019-07-05

## 2019-07-04 RX ORDER — CEFTRIAXONE 500 MG/1
1000 INJECTION, POWDER, FOR SOLUTION INTRAMUSCULAR; INTRAVENOUS EVERY 24 HOURS
Refills: 0 | Status: DISCONTINUED | OUTPATIENT
Start: 2019-07-04 | End: 2019-07-06

## 2019-07-04 RX ORDER — ENOXAPARIN SODIUM 100 MG/ML
40 INJECTION SUBCUTANEOUS DAILY
Refills: 0 | Status: DISCONTINUED | OUTPATIENT
Start: 2019-07-04 | End: 2019-07-09

## 2019-07-04 RX ORDER — DILTIAZEM HCL 120 MG
20 CAPSULE, EXT RELEASE 24 HR ORAL ONCE
Refills: 0 | Status: COMPLETED | OUTPATIENT
Start: 2019-07-04 | End: 2019-07-04

## 2019-07-04 RX ORDER — CIPROFLOXACIN LACTATE 400MG/40ML
400 VIAL (ML) INTRAVENOUS ONCE
Refills: 0 | Status: DISCONTINUED | OUTPATIENT
Start: 2019-07-04 | End: 2019-07-04

## 2019-07-04 RX ORDER — POTASSIUM PHOSPHATE, MONOBASIC POTASSIUM PHOSPHATE, DIBASIC 236; 224 MG/ML; MG/ML
30 INJECTION, SOLUTION INTRAVENOUS ONCE
Refills: 0 | Status: COMPLETED | OUTPATIENT
Start: 2019-07-04 | End: 2019-07-04

## 2019-07-04 RX ORDER — CIPROFLOXACIN LACTATE 400MG/40ML
VIAL (ML) INTRAVENOUS
Refills: 0 | Status: DISCONTINUED | OUTPATIENT
Start: 2019-07-04 | End: 2019-07-04

## 2019-07-04 RX ORDER — CIPROFLOXACIN LACTATE 400MG/40ML
400 VIAL (ML) INTRAVENOUS EVERY 12 HOURS
Refills: 0 | Status: DISCONTINUED | OUTPATIENT
Start: 2019-07-04 | End: 2019-07-04

## 2019-07-04 RX ORDER — CHLORHEXIDINE GLUCONATE 213 G/1000ML
15 SOLUTION TOPICAL EVERY 12 HOURS
Refills: 0 | Status: CANCELLED | OUTPATIENT
Start: 2019-07-04 | End: 2019-07-09

## 2019-07-04 RX ADMIN — Medication 2: at 17:55

## 2019-07-04 RX ADMIN — Medication 50 MILLIGRAM(S): at 07:42

## 2019-07-04 RX ADMIN — Medication 153 MILLIGRAM(S): at 05:35

## 2019-07-04 RX ADMIN — Medication 2: at 01:52

## 2019-07-04 RX ADMIN — Medication 2: at 21:32

## 2019-07-04 RX ADMIN — Medication 20 MILLIGRAM(S): at 11:36

## 2019-07-04 RX ADMIN — HYDROMORPHONE HYDROCHLORIDE 0.5 MILLIGRAM(S): 2 INJECTION INTRAMUSCULAR; INTRAVENOUS; SUBCUTANEOUS at 19:05

## 2019-07-04 RX ADMIN — Medication 2: at 13:34

## 2019-07-04 RX ADMIN — SODIUM CHLORIDE 100 MILLILITER(S): 9 INJECTION, SOLUTION INTRAVENOUS at 04:41

## 2019-07-04 RX ADMIN — Medication 100 MILLIGRAM(S): at 23:14

## 2019-07-04 RX ADMIN — FENTANYL CITRATE 4.16 MICROGRAM(S)/KG/HR: 50 INJECTION INTRAVENOUS at 17:54

## 2019-07-04 RX ADMIN — Medication 200 MILLIGRAM(S): at 21:29

## 2019-07-04 RX ADMIN — SODIUM CHLORIDE 100 MILLILITER(S): 9 INJECTION, SOLUTION INTRAVENOUS at 07:44

## 2019-07-04 RX ADMIN — CHLORHEXIDINE GLUCONATE 15 MILLILITER(S): 213 SOLUTION TOPICAL at 05:21

## 2019-07-04 RX ADMIN — Medication 2: at 05:22

## 2019-07-04 RX ADMIN — POTASSIUM PHOSPHATE, MONOBASIC POTASSIUM PHOSPHATE, DIBASIC 83.33 MILLIMOLE(S): 236; 224 INJECTION, SOLUTION INTRAVENOUS at 05:35

## 2019-07-04 RX ADMIN — HYDROMORPHONE HYDROCHLORIDE 0.5 MILLIGRAM(S): 2 INJECTION INTRAMUSCULAR; INTRAVENOUS; SUBCUTANEOUS at 11:30

## 2019-07-04 RX ADMIN — Medication 2: at 10:32

## 2019-07-04 RX ADMIN — CHLORHEXIDINE GLUCONATE 1 APPLICATION(S): 213 SOLUTION TOPICAL at 12:44

## 2019-07-04 RX ADMIN — FENTANYL CITRATE 4.16 MICROGRAM(S)/KG/HR: 50 INJECTION INTRAVENOUS at 07:44

## 2019-07-04 RX ADMIN — HYDROMORPHONE HYDROCHLORIDE 0.5 MILLIGRAM(S): 2 INJECTION INTRAMUSCULAR; INTRAVENOUS; SUBCUTANEOUS at 18:50

## 2019-07-04 RX ADMIN — Medication 50 MILLIGRAM(S): at 03:43

## 2019-07-04 RX ADMIN — Medication 153 MILLIGRAM(S): at 17:53

## 2019-07-04 RX ADMIN — CHLORHEXIDINE GLUCONATE 15 MILLILITER(S): 213 SOLUTION TOPICAL at 21:30

## 2019-07-04 RX ADMIN — Medication 3 MILLILITER(S): at 17:39

## 2019-07-04 RX ADMIN — Medication 100 MILLIGRAM(S): at 16:04

## 2019-07-04 RX ADMIN — FENTANYL CITRATE 4.16 MICROGRAM(S)/KG/HR: 50 INJECTION INTRAVENOUS at 04:30

## 2019-07-04 RX ADMIN — Medication 100 MILLIGRAM(S): at 07:44

## 2019-07-04 RX ADMIN — Medication 3 MILLILITER(S): at 12:28

## 2019-07-04 RX ADMIN — Medication 3 MILLILITER(S): at 00:19

## 2019-07-04 RX ADMIN — Medication 200 MILLIGRAM(S): at 10:32

## 2019-07-04 RX ADMIN — Medication 50 MILLIGRAM(S): at 16:03

## 2019-07-04 RX ADMIN — CEFTRIAXONE 100 MILLIGRAM(S): 500 INJECTION, POWDER, FOR SOLUTION INTRAMUSCULAR; INTRAVENOUS at 10:31

## 2019-07-04 RX ADMIN — Medication 153 MILLIGRAM(S): at 13:28

## 2019-07-04 RX ADMIN — ENOXAPARIN SODIUM 40 MILLIGRAM(S): 100 INJECTION SUBCUTANEOUS at 12:44

## 2019-07-04 RX ADMIN — SODIUM CHLORIDE 50 MILLILITER(S): 9 INJECTION, SOLUTION INTRAVENOUS at 17:54

## 2019-07-04 RX ADMIN — CHLORHEXIDINE GLUCONATE 15 MILLILITER(S): 213 SOLUTION TOPICAL at 13:29

## 2019-07-04 RX ADMIN — HYDROMORPHONE HYDROCHLORIDE 0.5 MILLIGRAM(S): 2 INJECTION INTRAMUSCULAR; INTRAVENOUS; SUBCUTANEOUS at 11:45

## 2019-07-04 RX ADMIN — Medication 100 MILLIGRAM(S): at 01:00

## 2019-07-04 RX ADMIN — Medication 0.5 MILLIGRAM(S): at 17:39

## 2019-07-04 RX ADMIN — PANTOPRAZOLE SODIUM 40 MILLIGRAM(S): 20 TABLET, DELAYED RELEASE ORAL at 05:21

## 2019-07-04 RX ADMIN — SODIUM CHLORIDE 50 MILLILITER(S): 9 INJECTION, SOLUTION INTRAVENOUS at 10:33

## 2019-07-04 RX ADMIN — Medication 0.5 MILLIGRAM(S): at 05:49

## 2019-07-04 RX ADMIN — Medication 3 MILLILITER(S): at 05:49

## 2019-07-04 RX ADMIN — Medication 50 MILLIGRAM(S): at 10:31

## 2019-07-04 NOTE — CHART NOTE - NSCHARTNOTEFT_GEN_A_CORE
Surgery: Abdominal washout, possible closure    Surgeon: Dr. Clark Alvarado    S: Patient intubated and sedated    Vital Signs Last 24 Hrs  T(C): 37.3 (04 Jul 2019 15:00), Max: 37.3 (04 Jul 2019 15:00)  T(F): 99.2 (04 Jul 2019 15:00), Max: 99.2 (04 Jul 2019 15:00)  HR: 111 (04 Jul 2019 19:00) (109 - 146)  BP: 146/64 (04 Jul 2019 19:00) (105/57 - 175/72)  BP(mean): 92 (04 Jul 2019 19:00) (71 - 110)  RR: 24 (04 Jul 2019 19:00) (12 - 27)  SpO2: 98% (04 Jul 2019 19:00) (95% - 100%)    General: sedated  Resp: ETT in secured, respirations unlabored  CVS: regular rate and rhythm  Abdomen: softly distended, abthera vac in place with good suction  Extremities: +edema  Skin: warm, dry, appropriate color    ICU Vital Signs Last 24 Hrs  T(C): 37.3 (04 Jul 2019 15:00), Max: 37.3 (04 Jul 2019 15:00)  T(F): 99.2 (04 Jul 2019 15:00), Max: 99.2 (04 Jul 2019 15:00)  HR: 111 (04 Jul 2019 19:00) (109 - 146)  BP: 146/64 (04 Jul 2019 19:00) (105/57 - 175/72)  BP(mean): 92 (04 Jul 2019 19:00) (71 - 110)  ABP: 101/61 (04 Jul 2019 02:00) (101/54 - 126/58)  ABP(mean): 75 (04 Jul 2019 02:00) (69 - 81)  RR: 24 (04 Jul 2019 19:00) (12 - 27)  SpO2: 98% (04 Jul 2019 19:00) (95% - 100%)                        8.0    11.6  )-----------( 97       ( 04 Jul 2019 16:07 )             24.3   07-04    148<H>  |  114<H>  |  29<H>  ----------------------------<  170<H>  3.8   |  24  |  1.12    Ca    7.2<L>      04 Jul 2019 16:07  Phos  2.6     07-04  Mg     2.1     07-04    TPro  4.2<L>  /  Alb  1.7<L>  /  TBili  0.3  /  DBili  0.2  /  AST  28  /  ALT  13  /  AlkPhos  35<L>  07-04    PT/INR - ( 04 Jul 2019 04:09 )   PT: 15.8 sec;   INR: 1.37 ratio         PTT - ( 04 Jul 2019 04:09 )  PTT:34.6 sec    A/P: 75 y/o F with PMHx of HFrEF s/p AICD, asthma, HTN, afib on eliquis p/w abd pain, transferred to MICU for hypoxic respiratory failure in setting of IV contrast administration, requiring bipap.  CT, diagnosed with diverticulitis S/p ex-lap, left hemicolectomy and end colostomy with abthera placement on 7/2.   Plan for abdominal washout, possible closure in OR.

## 2019-07-04 NOTE — PROGRESS NOTE ADULT - ATTENDING COMMENTS
DIANNA resolved  hypernatremia (free water deficit = 2.7L) likely from high concentration of sodium in a large volume of resuscitateive IVF over the past 3 days, but could also be from free-water clearance during resolution of DIANNA  -D5W @ 50 mL/hour (should correct in 2-3 days assuming no significant free water clearance)  -will check urine electrolytes / osm if hypernatremia is not improving tonight    acute respiratory failure  -PS 8 / +5 / 30%  ->  RR = 19 / min, TV = 550 mL, MV = 9.9 lpm, SpO2 = 98%, ETCO2 = 35 mmHg  -meets criteria for successful liberation from mechanical vent, but developed significant afib/RVR while exchanging OG for NG and required diltiazem for sustained arrythmia    sepsis from perforated diverticulitis with intraabdominal hypertension  7/2 - s/p Lupe's / ABThera  7/3 - s/p washout / ABThera  7/5 - washout / possible closure planned    peritoneal culture (7/2/2019) - alpha hemolytic Strep, E coli (resistant to fluoroquinolones)  -change aztreonam -> ceftriaxone to assure Strep coverage  -continue Flagyl    hypophosphatemia  -IV repletion

## 2019-07-04 NOTE — PROGRESS NOTE ADULT - ASSESSMENT
Ms Dolan is a 74 yof with PMH of HFrEF with AICD, asthma, HTN, HLD, atrial fibrillation on eliquis presenting with CT demonstrated new contained perforation 2/2 diverticulitis.  Patient now s/p her second operation of abdominal washout.     - Second abdominal washout scheduled for Friday AM  - NPO/IVF  - Continue aztreonam + GARCÍA Ms Dolan is a 74 yof with PMH of HFrEF with AICD, asthma, HTN, HLD, atrial fibrillation on eliquis presenting with CT demonstrated new contained perforation 2/2 diverticulitis.  Patient now s/p her second operation of abdominal washout.     - Takeback to OR for abdominal washout planned for Friday AM  - NPO/IVF  - Continue aztreonam + GARCÍA

## 2019-07-04 NOTE — PROGRESS NOTE ADULT - SUBJECTIVE AND OBJECTIVE BOX
Interval Events:    S/p abdominal washout and replacement of abthera yesterday  No acute events overnight    S: Patient doing well, denies fevers, chills, nausea, emesis, chest pain, SOB.    O: Vital Signs  T(C): 36.9 (07-03 @ 23:00), Max: 37.3 (07-03 @ 07:00)  HR: 118 (07-04 @ 02:00) (106 - 126)  BP: --  RR: 24 (07-04 @ 02:00) (7 - 29)  SpO2: 99% (07-04 @ 02:00) (98% - 100%)  07-02-19 @ 07:01  -  07-03-19 @ 07:00  --------------------------------------------------------  IN: 6026.8 mL / OUT: 3110 mL / NET: 2916.8 mL    07-03-19 @ 07:01  -  07-04-19 @ 03:01  --------------------------------------------------------  IN: 2513.7 mL / OUT: 1145 mL / NET: 1368.7 mL      General: Patient intubated.  HEENT: IJ line in place  Resp: airway patent, respirations unlabored  CVS: regular rate and rhythm  Abdomen: soft, nontender, nondistended ab there in place and draining serosanguineous fluid.  Ching draining dark urine  Extremities: no edema  Skin: warm, dry, appropriate color                          9.8    6.2   )-----------( 124      ( 03 Jul 2019 14:13 )             28.7   07-03    146<H>  |  113<H>  |  27<H>  ----------------------------<  65<L>  4.1   |  20<L>  |  1.11    Ca    8.0<L>      03 Jul 2019 14:13  Phos  2.8     07-03  Mg     2.2     07-03    TPro  4.6<L>  /  Alb  2.0<L>  /  TBili  0.5  /  DBili  0.2  /  AST  30  /  ALT  19  /  AlkPhos  31<L>  07-02 Interval Events:    S/p abdominal washout and replacement of abthera yesterday  No acute events overnight    S: Patient intubated and sedated    O: Vital Signs  T(C): 36.9 (07-03 @ 23:00), Max: 37.3 (07-03 @ 07:00)  HR: 118 (07-04 @ 02:00) (106 - 126)  BP: --  RR: 24 (07-04 @ 02:00) (7 - 29)  SpO2: 99% (07-04 @ 02:00) (98% - 100%)  07-02-19 @ 07:01  -  07-03-19 @ 07:00  --------------------------------------------------------  IN: 6026.8 mL / OUT: 3110 mL / NET: 2916.8 mL    07-03-19 @ 07:01  -  07-04-19 @ 03:01  --------------------------------------------------------  IN: 2513.7 mL / OUT: 1145 mL / NET: 1368.7 mL      General: Patient intubated.  HEENT: IJ line in place  Resp: airway patent, respirations unlabored  CVS: regular rate and rhythm  Abdomen: soft, nontender, nondistended abthera in place and draining serosanguineous fluid.  Ching draining dark urine  Extremities: no edema  Skin: warm, dry, appropriate color                          9.8    6.2   )-----------( 124      ( 03 Jul 2019 14:13 )             28.7   07-03    146<H>  |  113<H>  |  27<H>  ----------------------------<  65<L>  4.1   |  20<L>  |  1.11    Ca    8.0<L>      03 Jul 2019 14:13  Phos  2.8     07-03  Mg     2.2     07-03    TPro  4.6<L>  /  Alb  2.0<L>  /  TBili  0.5  /  DBili  0.2  /  AST  30  /  ALT  19  /  AlkPhos  31<L>  07-02 Interval Events:    S/p abdominal washout and replacement of abthera yesterday  No acute events overnight    S: Patient intubated and sedated    O: Vital Signs  T(C): 36.9 (07-03 @ 23:00), Max: 37.3 (07-03 @ 07:00)  HR: 118 (07-04 @ 02:00) (106 - 126)  BP: --  RR: 24 (07-04 @ 02:00) (7 - 29)  SpO2: 99% (07-04 @ 02:00) (98% - 100%)  07-02-19 @ 07:01  -  07-03-19 @ 07:00  --------------------------------------------------------  IN: 6026.8 mL / OUT: 3110 mL / NET: 2916.8 mL    07-03-19 @ 07:01  -  07-04-19 @ 03:01  --------------------------------------------------------  IN: 2513.7 mL / OUT: 1145 mL / NET: 1368.7 mL      General: Patient intubated.  HEENT: IJ line in place  Resp: airway patent, respirations unlabored  CVS: regular rate and rhythm  Abdomen: soft, appropriately tender, nondistended abthera in place and draining serosanguineous fluid.  Ching draining dark urine  Extremities: no edema  Skin: warm, dry, appropriate color                          9.8    6.2   )-----------( 124      ( 03 Jul 2019 14:13 )             28.7   07-03    146<H>  |  113<H>  |  27<H>  ----------------------------<  65<L>  4.1   |  20<L>  |  1.11    Ca    8.0<L>      03 Jul 2019 14:13  Phos  2.8     07-03  Mg     2.2     07-03    TPro  4.6<L>  /  Alb  2.0<L>  /  TBili  0.5  /  DBili  0.2  /  AST  30  /  ALT  19  /  AlkPhos  31<L>  07-02

## 2019-07-04 NOTE — PROGRESS NOTE ADULT - SUBJECTIVE AND OBJECTIVE BOX
HISTORY  74 F with PMHx of HFrEF s/p AICD, asthma, HTN, HLD, atrial fibrillation on eliquis p/w severe, sudden onset L sided abd pain which started yesterday morning after eating. She endorsed nausea at the time but no vomiting. She did have any fevers, chills, diarrhea or hematochezia.      CTAP w/ IV contrast showed proximal descending colon/splenic flexure diverticulitis with small amount of free fluid and 3cm low attenuation structure in the right adnexa ?fluid collection ?ovarian cyst. She developed dyspnea and wheezing after the CT scan and became hypoxic. Bedside US int he ED demonestrated a few B lines. She received 80mg total of IV lasix, 125 of solumedrol, duonebs x2 and narcan and she was admitted to MICU for management of respiratory and heart failure.     Overnight through this morning she was persistently tachycardic with BPs as low as 76/50, MAPs in low 60s. She was bolused with 500cc of crystalloid without improvement in blood pressure. She was then started on phenylephrine. Her lactate was uptrending from 2 to 2.6 to 3 on the arterial blood gas. A repeat CT scan showed "Acute diverticulitis at the splenic flexure with evidence of contained perforation. No drainable regional collection. Increased ascites in the deep pelvis." She was transferred to the SICU for further HD monitoring. Antibiotics were switched to Aztreonam and flagyl. Bedside ultrasound showed IV 1.5 minimally collapsable and echo consistent with euvolemic state.     24 HOUR EVENTS:  -RTOR for abdominal washout, abthera vac placement  -Given Kcentra prior to the OR, INR 1.51 from 2.49. Repeat overnight 1.44  -Lactate increase overnight to 2.7 from 2.0  -Off pressors  -Insulin infusion stopped, placed on sliding scale    SUBJECTIVE/ROS:  [ ] A ten-point review of systems was otherwise negative except as noted.  [x] Due to altered mental status/intubation, subjective information were not able to be obtained from the patient. History was obtained, to the extent possible, from review of the chart and collateral sources of information.      NEURO  Exam: sedated, easily arousable, no acute distress, no focal deficits  Meds: fentaNYL   Infusion. 1 MICROgram(s)/kG/Hr IV Continuous <Continuous>  HYDROmorphone  Injectable 0.5 milliGRAM(s) IV Push every 4 hours PRN breakthrough pain    [x] Adequacy of sedation and pain control has been assessed and adjusted      RESPIRATORY  RR: 24 (07-03-19 @ 23:00) (7 - 29)  SpO2: 99% (07-04-19 @ 00:20) (98% - 100%)  Wt(kg): --  Exam: unlabored, clear to auscultation bilaterally  Mechanical Ventilation: Mode: AC/ CMV (Assist Control/ Continuous Mandatory Ventilation), RR (machine): 24, RR (patient): 27, TV (machine): 450, FiO2: 30, PEEP: 5, ITime: 0.7, MAP: 12, PIP: 38  ABG - ( 03 Jul 2019 22:02 )  pH: 7.42  /  pCO2: 38    /  pO2: 134   / HCO3: 24    / Base Excess: .0    /  SaO2: 99      Lactate: x                [N/A] Extubation Readiness Assessed  Meds: ALBUTerol/ipratropium for Nebulization 3 milliLiter(s) Nebulizer every 6 hours  buDESOnide   0.5 milliGRAM(s) Respule 0.5 milliGRAM(s) Inhalation every 12 hours        CARDIOVASCULAR  HR: 119 (07-04-19 @ 00:20) (106 - 126)  BP: --  BP(mean): --  ABP: 101/54 (07-03-19 @ 23:00) (101/54 - 174/72)  ABP(mean): 69 (07-03-19 @ 23:00) (68 - 107)  Wt(kg): --  CVP(cm H2O): --  VBG - ( 03 Jul 2019 02:08 )  pH: 7.40  /  pCO2: 38    /  pO2: 46    / HCO3: 23    / Base Excess: -1.4  /  SaO2: 80     Lactate: 4.0                Exam: regular rate and rhythm  Cardiac Rhythm: sinus  Perfusion     [x]Adequate   [ ]Inadequate  Mentation   [x]Normal       [ ]Reduced  Extremities  [x]Warm         [ ]Cool  Volume Status [ ]Hypervolemic [x]Euvolemic [ ]Hypovolemic  Meds:       GI/NUTRITION  Exam: softly distended, appropriately tender, ostomy pink and viable w/o output, Abthera vac in place holding suction w/ serosanguinous drainage  Diet: NPO, OGT LCWS  Meds: pantoprazole  Injectable 40 milliGRAM(s) IV Push every 24 hours      GENITOURINARY  I&O's Detail    07-02 @ 07:01 - 07-03 @ 07:00  --------------------------------------------------------  IN:    dextrose 5% + lactated ringers.: 750 mL    dextrose 5% + lactated ringers.: 1300 mL    fentaNYL Infusion.: 100.8 mL    insulin regular Infusion: 21 mL    IV PiggyBack: 750 mL    lactated ringers.: 200 mL    multiple electrolytes Injection Type 1 Bolus: 1500 mL    norepinephrine Infusion: 645.3 mL    Solution: 416.5 mL    Solution: 250 mL    Solution: 50 mL    vasopressin Infusion: 43.2 mL  Total IN: 6026.8 mL    OUT:    Ureteral Catheter: 2560 mL    VAC (Vacuum Assisted Closure) System: 550 mL  Total OUT: 3110 mL    Total NET: 2916.8 mL      07-03 @ 07:01 - 07-04 @ 01:21  --------------------------------------------------------  IN:    dextrose 5% + lactated ringers.: 100 mL    dextrose 5% + lactated ringers.: 400 mL    dextrose 5% + lactated ringers.: 800 mL    fentaNYL Infusion.: 58.8 mL    insulin regular Infusion: 30 mL    IV PiggyBack: 150 mL    norepinephrine Infusion: 35.1 mL    Solution: 166.6 mL    Solution: 348 mL    vasopressin Infusion: 12.6 mL  Total IN: 2101.1 mL    OUT:    Ureteral Catheter: 825 mL    VAC (Vacuum Assisted Closure) System: 200 mL  Total OUT: 1025 mL    Total NET: 1076.1 mL          07-03    146<H>  |  113<H>  |  27<H>  ----------------------------<  65<L>  4.1   |  20<L>  |  1.11    Ca    8.0<L>      03 Jul 2019 14:13  Phos  2.8     07-03  Mg     2.2     07-03    TPro  4.6<L>  /  Alb  2.0<L>  /  TBili  0.5  /  DBili  0.2  /  AST  30  /  ALT  19  /  AlkPhos  31<L>  07-02    [x] Ching catheter  Meds: ascorbic acid IVPB 1500 milliGRAM(s) IV Intermittent every 6 hours  dextrose 5% + lactated ringers. 1000 milliLiter(s) IV Continuous <Continuous>  thiamine Injectable 200 milliGRAM(s) IV Push <User Schedule>        HEMATOLOGIC  Meds:   [ ] VTE Prophylaxis                        9.8    6.2   )-----------( 124      ( 03 Jul 2019 14:13 )             28.7     PT/INR - ( 03 Jul 2019 14:13 )   PT: 16.6 sec;   INR: 1.44 ratio         PTT - ( 03 Jul 2019 14:13 )  PTT:26.2 sec  Transfusion     [ ] PRBC   [ ] Platelets   [ ] FFP   [ ] Cryoprecipitate      INFECTIOUS DISEASES  WBC Count: 6.2 K/uL (07-03 @ 14:13)  WBC Count: 8.2 K/uL (07-03 @ 02:10)    RECENT CULTURES:  Specimen Source: .Surgical Swab None Specimen Source:  abdomen, esw  Date/Time: 07-02 @ 10:15  Culture Results:   Moderate Gram Negative Rods  Moderate Alpha hemolytic strep "Susceptibilities not performed"  Gram Stain: --  Organism: --  Specimen Source: .Blood  Date/Time: 07-01 @ 10:04  Culture Results:   No growth to date.  Gram Stain: --  Organism: --  Specimen Source: .Urine  Date/Time: 07-01 @ 10:03  Culture Results:   No growth  Gram Stain: --  Organism: --    Meds: aztreonam  IVPB 1000 milliGRAM(s) IV Intermittent every 8 hours  metroNIDAZOLE  IVPB 500 milliGRAM(s) IV Intermittent every 8 hours        ENDOCRINE  CAPILLARY BLOOD GLUCOSE      POCT Blood Glucose.: 174 mg/dL (03 Jul 2019 21:40)  POCT Blood Glucose.: 193 mg/dL (03 Jul 2019 17:36)  POCT Blood Glucose.: 137 mg/dL (03 Jul 2019 14:24)  POCT Blood Glucose.: 65 mg/dL (03 Jul 2019 13:53)  POCT Blood Glucose.: 171 mg/dL (03 Jul 2019 10:56)  POCT Blood Glucose.: 181 mg/dL (03 Jul 2019 09:54)  POCT Blood Glucose.: 229 mg/dL (03 Jul 2019 09:05)  POCT Blood Glucose.: 235 mg/dL (03 Jul 2019 08:06)  POCT Blood Glucose.: 250 mg/dL (03 Jul 2019 06:55)  POCT Blood Glucose.: 230 mg/dL (03 Jul 2019 06:03)  POCT Blood Glucose.: 196 mg/dL (03 Jul 2019 05:03)  POCT Blood Glucose.: 196 mg/dL (03 Jul 2019 04:02)  POCT Blood Glucose.: 194 mg/dL (03 Jul 2019 03:05)  POCT Blood Glucose.: 219 mg/dL (03 Jul 2019 02:03)    Meds: hydrocortisone sodium succinate Injectable 50 milliGRAM(s) IV Push every 6 hours  insulin lispro (HumaLOG) corrective regimen sliding scale   SubCutaneous every 4 hours        ACCESS DEVICES:  [x] Peripheral IV  [x] Central Venous Line	[ ] R	[ ] L	[ ] IJ	[ ] Fem	[ ] SC	Placed:   [x] Arterial Line		[ ] R	[ ] L	[ ] Fem	[ ] Rad	[ ] Ax	Placed:   [ ] PICC:					[ ] Mediport  [c] Urinary Catheter, Date Placed:   [x] Necessity of urinary, arterial, and venous catheters discussed    OTHER MEDICATIONS:  chlorhexidine 0.12% Liquid 15 milliLiter(s) Oral Mucosa <User Schedule>  chlorhexidine 2% Cloths 1 Application(s) Topical daily      CODE STATUS: Full code      IMAGING:

## 2019-07-04 NOTE — PROGRESS NOTE ADULT - ASSESSMENT
ASSESSMENT:  74F admitted with abdominal pain and exam findings consistent with peritonitis secondary to acute diverticulitis at the splenic flexure with evidence of contained perforation now POD2 s/p Exploratory laparotomy, left hemicolectomy, end colostomy, and ABThera Vac placement and POD1 from RTOR for abdominal washout and abthera vac placement, off pressors.     PLAN:  NEURO: Intubated and sedated on fentanyl  - Pain control with dilaudid and fentanyl    RESPIRATORY: intubated, PMHx of Asthma  - F/u AM chest Xray  - F/u serial ABGS  - Wean mechanical ventilation    CARDIOVASCULAR:  off pressors  - Cautious fluid resuscitation, appreciate heart failure recommendations  - Hold home anti-hypertensives  - Trend lactate    GI/NUTRITION: s/p ex lap and L hemicolectomy with end colostomy and ABThera VAC placement and washout  - f/u future plan for RTOR  - NPO, IVF, OGT    - monitor for ostomy function    GENITOURINARY/RENAL: DIANNA (improving)  - Renally dose antibiotics  - Trend K+    HEMATOLOGIC: Drop in H/H consistent with postop blood loss anemia  - Patient does NOT accept blood products  - Trend CBCs  - Hold ASA  - Elevated INR - s/p Kcentra for the OR    INFECTIOUS DISEASE: Colonic perforation  - Aztreonam and Flagyl     ENDOCRINE:  - ISS, HgA1C    SICU [id number]

## 2019-07-05 LAB
ANION GAP SERPL CALC-SCNC: 10 MMOL/L — SIGNIFICANT CHANGE UP (ref 5–17)
ANION GAP SERPL CALC-SCNC: 12 MMOL/L — SIGNIFICANT CHANGE UP (ref 5–17)
APTT BLD: 27.1 SEC — LOW (ref 27.5–36.3)
APTT BLD: 31.3 SEC — SIGNIFICANT CHANGE UP (ref 27.5–36.3)
BLD GP AB SCN SERPL QL: NEGATIVE — SIGNIFICANT CHANGE UP
BUN SERPL-MCNC: 32 MG/DL — HIGH (ref 7–23)
BUN SERPL-MCNC: 34 MG/DL — HIGH (ref 7–23)
CALCIUM SERPL-MCNC: 7.2 MG/DL — LOW (ref 8.4–10.5)
CALCIUM SERPL-MCNC: 7.7 MG/DL — LOW (ref 8.4–10.5)
CHLORIDE SERPL-SCNC: 113 MMOL/L — HIGH (ref 96–108)
CHLORIDE SERPL-SCNC: 113 MMOL/L — HIGH (ref 96–108)
CO2 SERPL-SCNC: 24 MMOL/L — SIGNIFICANT CHANGE UP (ref 22–31)
CO2 SERPL-SCNC: 25 MMOL/L — SIGNIFICANT CHANGE UP (ref 22–31)
CREAT SERPL-MCNC: 1.18 MG/DL — SIGNIFICANT CHANGE UP (ref 0.5–1.3)
CREAT SERPL-MCNC: 1.22 MG/DL — SIGNIFICANT CHANGE UP (ref 0.5–1.3)
GAS PNL BLDV: SIGNIFICANT CHANGE UP
GAS PNL BLDV: SIGNIFICANT CHANGE UP
GLUCOSE BLDC GLUCOMTR-MCNC: 130 MG/DL — HIGH (ref 70–99)
GLUCOSE BLDC GLUCOMTR-MCNC: 146 MG/DL — HIGH (ref 70–99)
GLUCOSE BLDC GLUCOMTR-MCNC: 160 MG/DL — HIGH (ref 70–99)
GLUCOSE BLDC GLUCOMTR-MCNC: 160 MG/DL — HIGH (ref 70–99)
GLUCOSE BLDC GLUCOMTR-MCNC: 168 MG/DL — HIGH (ref 70–99)
GLUCOSE SERPL-MCNC: 129 MG/DL — HIGH (ref 70–99)
GLUCOSE SERPL-MCNC: 176 MG/DL — HIGH (ref 70–99)
HCT VFR BLD CALC: 24.8 % — LOW (ref 34.5–45)
HCT VFR BLD CALC: 27.2 % — LOW (ref 34.5–45)
HGB BLD-MCNC: 8 G/DL — LOW (ref 11.5–15.5)
HGB BLD-MCNC: 8.4 G/DL — LOW (ref 11.5–15.5)
INR BLD: 1.19 RATIO — HIGH (ref 0.88–1.16)
INR BLD: 1.23 RATIO — HIGH (ref 0.88–1.16)
MAGNESIUM SERPL-MCNC: 2.2 MG/DL — SIGNIFICANT CHANGE UP (ref 1.6–2.6)
MAGNESIUM SERPL-MCNC: 2.4 MG/DL — SIGNIFICANT CHANGE UP (ref 1.6–2.6)
MCHC RBC-ENTMCNC: 29 PG — SIGNIFICANT CHANGE UP (ref 27–34)
MCHC RBC-ENTMCNC: 29.6 GM/DL — LOW (ref 32–36)
MCHC RBC-ENTMCNC: 32.9 PG — SIGNIFICANT CHANGE UP (ref 27–34)
MCHC RBC-ENTMCNC: 34 GM/DL — SIGNIFICANT CHANGE UP (ref 32–36)
MCV RBC AUTO: 96.9 FL — SIGNIFICANT CHANGE UP (ref 80–100)
MCV RBC AUTO: 97.8 FL — SIGNIFICANT CHANGE UP (ref 80–100)
PHOSPHATE SERPL-MCNC: 3.1 MG/DL — SIGNIFICANT CHANGE UP (ref 2.5–4.5)
PHOSPHATE SERPL-MCNC: 3.1 MG/DL — SIGNIFICANT CHANGE UP (ref 2.5–4.5)
PLATELET # BLD AUTO: 104 K/UL — LOW (ref 150–400)
PLATELET # BLD AUTO: 82 K/UL — LOW (ref 150–400)
POTASSIUM SERPL-MCNC: 3.9 MMOL/L — SIGNIFICANT CHANGE UP (ref 3.5–5.3)
POTASSIUM SERPL-MCNC: 3.9 MMOL/L — SIGNIFICANT CHANGE UP (ref 3.5–5.3)
POTASSIUM SERPL-SCNC: 3.9 MMOL/L — SIGNIFICANT CHANGE UP (ref 3.5–5.3)
POTASSIUM SERPL-SCNC: 3.9 MMOL/L — SIGNIFICANT CHANGE UP (ref 3.5–5.3)
PROTHROM AB SERPL-ACNC: 13.8 SEC — HIGH (ref 10–12.9)
PROTHROM AB SERPL-ACNC: 14.1 SEC — HIGH (ref 10–12.9)
RBC # BLD: 2.56 M/UL — LOW (ref 3.8–5.2)
RBC # BLD: 2.78 M/UL — LOW (ref 3.8–5.2)
RBC # FLD: 14.5 % — SIGNIFICANT CHANGE UP (ref 10.3–14.5)
RBC # FLD: 14.5 % — SIGNIFICANT CHANGE UP (ref 10.3–14.5)
RH IG SCN BLD-IMP: POSITIVE — SIGNIFICANT CHANGE UP
SODIUM SERPL-SCNC: 148 MMOL/L — HIGH (ref 135–145)
SODIUM SERPL-SCNC: 149 MMOL/L — HIGH (ref 135–145)
WBC # BLD: 13.1 K/UL — HIGH (ref 3.8–10.5)
WBC # BLD: 15.6 K/UL — HIGH (ref 3.8–10.5)
WBC # FLD AUTO: 13.1 K/UL — HIGH (ref 3.8–10.5)
WBC # FLD AUTO: 15.6 K/UL — HIGH (ref 3.8–10.5)

## 2019-07-05 PROCEDURE — 99233 SBSQ HOSP IP/OBS HIGH 50: CPT

## 2019-07-05 PROCEDURE — 71045 X-RAY EXAM CHEST 1 VIEW: CPT | Mod: 26

## 2019-07-05 RX ORDER — POTASSIUM CHLORIDE 20 MEQ
20 PACKET (EA) ORAL ONCE
Refills: 0 | Status: COMPLETED | OUTPATIENT
Start: 2019-07-05 | End: 2019-07-05

## 2019-07-05 RX ORDER — ELECTROLYTE SOLUTION,INJ
1 VIAL (ML) INTRAVENOUS
Refills: 0 | Status: DISCONTINUED | OUTPATIENT
Start: 2019-07-05 | End: 2019-07-05

## 2019-07-05 RX ORDER — FUROSEMIDE 40 MG
40 TABLET ORAL ONCE
Refills: 0 | Status: DISCONTINUED | OUTPATIENT
Start: 2019-07-05 | End: 2019-07-05

## 2019-07-05 RX ORDER — CHLORHEXIDINE GLUCONATE 213 G/1000ML
1 SOLUTION TOPICAL
Refills: 0 | Status: DISCONTINUED | OUTPATIENT
Start: 2019-07-05 | End: 2019-07-05

## 2019-07-05 RX ORDER — DEXMEDETOMIDINE HYDROCHLORIDE IN 0.9% SODIUM CHLORIDE 4 UG/ML
0.1 INJECTION INTRAVENOUS
Qty: 200 | Refills: 0 | Status: DISCONTINUED | OUTPATIENT
Start: 2019-07-05 | End: 2019-07-06

## 2019-07-05 RX ORDER — SODIUM CHLORIDE 9 MG/ML
10 INJECTION INTRAMUSCULAR; INTRAVENOUS; SUBCUTANEOUS
Refills: 0 | Status: DISCONTINUED | OUTPATIENT
Start: 2019-07-05 | End: 2019-07-05

## 2019-07-05 RX ORDER — HYDROMORPHONE HYDROCHLORIDE 2 MG/ML
0.5 INJECTION INTRAMUSCULAR; INTRAVENOUS; SUBCUTANEOUS
Refills: 0 | Status: DISCONTINUED | OUTPATIENT
Start: 2019-07-05 | End: 2019-07-09

## 2019-07-05 RX ORDER — CHLORHEXIDINE GLUCONATE 213 G/1000ML
1 SOLUTION TOPICAL
Refills: 0 | Status: DISCONTINUED | OUTPATIENT
Start: 2019-07-05 | End: 2019-07-06

## 2019-07-05 RX ORDER — FUROSEMIDE 40 MG
40 TABLET ORAL ONCE
Refills: 0 | Status: COMPLETED | OUTPATIENT
Start: 2019-07-05 | End: 2019-07-05

## 2019-07-05 RX ORDER — SODIUM CHLORIDE 9 MG/ML
10 INJECTION INTRAMUSCULAR; INTRAVENOUS; SUBCUTANEOUS
Refills: 0 | Status: DISCONTINUED | OUTPATIENT
Start: 2019-07-05 | End: 2019-07-06

## 2019-07-05 RX ADMIN — Medication 153 MILLIGRAM(S): at 00:54

## 2019-07-05 RX ADMIN — Medication 153 MILLIGRAM(S): at 23:55

## 2019-07-05 RX ADMIN — PANTOPRAZOLE SODIUM 40 MILLIGRAM(S): 20 TABLET, DELAYED RELEASE ORAL at 06:23

## 2019-07-05 RX ADMIN — CHLORHEXIDINE GLUCONATE 15 MILLILITER(S): 213 SOLUTION TOPICAL at 13:15

## 2019-07-05 RX ADMIN — Medication 100 MILLIGRAM(S): at 18:11

## 2019-07-05 RX ADMIN — Medication 2: at 06:22

## 2019-07-05 RX ADMIN — Medication 200 MILLIGRAM(S): at 21:20

## 2019-07-05 RX ADMIN — Medication 153 MILLIGRAM(S): at 18:49

## 2019-07-05 RX ADMIN — Medication 3 MILLILITER(S): at 06:44

## 2019-07-05 RX ADMIN — Medication 0.5 MILLIGRAM(S): at 18:01

## 2019-07-05 RX ADMIN — CHLORHEXIDINE GLUCONATE 15 MILLILITER(S): 213 SOLUTION TOPICAL at 06:23

## 2019-07-05 RX ADMIN — Medication 3 MILLILITER(S): at 00:32

## 2019-07-05 RX ADMIN — Medication 3 MILLILITER(S): at 18:00

## 2019-07-05 RX ADMIN — Medication 153 MILLIGRAM(S): at 06:54

## 2019-07-05 RX ADMIN — DEXMEDETOMIDINE HYDROCHLORIDE IN 0.9% SODIUM CHLORIDE 2.08 MICROGRAM(S)/KG/HR: 4 INJECTION INTRAVENOUS at 17:55

## 2019-07-05 RX ADMIN — Medication 2: at 01:24

## 2019-07-05 RX ADMIN — Medication 100 MILLIGRAM(S): at 08:33

## 2019-07-05 RX ADMIN — Medication 1 EACH: at 17:53

## 2019-07-05 RX ADMIN — CHLORHEXIDINE GLUCONATE 1 APPLICATION(S): 213 SOLUTION TOPICAL at 17:54

## 2019-07-05 RX ADMIN — CHLORHEXIDINE GLUCONATE 15 MILLILITER(S): 213 SOLUTION TOPICAL at 22:23

## 2019-07-05 RX ADMIN — Medication 200 MILLIGRAM(S): at 08:34

## 2019-07-05 RX ADMIN — CHLORHEXIDINE GLUCONATE 1 APPLICATION(S): 213 SOLUTION TOPICAL at 17:53

## 2019-07-05 RX ADMIN — Medication 0.5 MILLIGRAM(S): at 06:44

## 2019-07-05 RX ADMIN — CHLORHEXIDINE GLUCONATE 15 MILLILITER(S): 213 SOLUTION TOPICAL at 17:54

## 2019-07-05 RX ADMIN — Medication 40 MILLIGRAM(S): at 17:53

## 2019-07-05 RX ADMIN — CHLORHEXIDINE GLUCONATE 15 MILLILITER(S): 213 SOLUTION TOPICAL at 18:12

## 2019-07-05 RX ADMIN — Medication 1 EACH: at 21:20

## 2019-07-05 RX ADMIN — Medication 2: at 22:23

## 2019-07-05 RX ADMIN — Medication 50 MILLIEQUIVALENT(S): at 02:30

## 2019-07-05 RX ADMIN — ENOXAPARIN SODIUM 40 MILLIGRAM(S): 100 INJECTION SUBCUTANEOUS at 13:15

## 2019-07-05 RX ADMIN — CEFTRIAXONE 100 MILLIGRAM(S): 500 INJECTION, POWDER, FOR SOLUTION INTRAMUSCULAR; INTRAVENOUS at 12:08

## 2019-07-05 RX ADMIN — Medication 3 MILLILITER(S): at 11:53

## 2019-07-05 NOTE — CONSULT NOTE ADULT - ASSESSMENT
A/P:74F admitted with abdominal pain and exam findings consistent with peritonitis secondary to acute diverticulitis at the splenic flexure with evidence of contained perforation now POD3 s/p Exploratory laparotomy, left hemicolectomy, end colostomy, and ABThera Vac placement and POD2 from RTOR for abdominal washout and abthera vac placement, off pressors.       [X  ] Initiate TPN formula: at 1/2 goal today in 2L fluid with a goal of approx 1500Kcal w/  Carbohydrates: 210_grams, Amino Acid: 120grams  will asses Lipid needs once Triglycerides level obtained  Protein-Calorie Malnutrition    1. Dedicated Central line must be placed for TPN, no TPN will be ordered until line is placed  Pt on Cefriaxone - can not run this in PICC  2. Strict Intake and Output.  3. Weights three times a week  4. Monitor BMP, Mg+, Ionized Ca++, Phosphorus daily  5.  Check Triglycerides daily for first 3 days of TPN, then monthly   6. Pre-albumin weekly.  7. Hyperglycemia-  Initiate TPN w/ 5U insulin and will adjust accordingly as Dextrose increased and based on FS.        Fingersticks to monitor glucose every 6 hours until stable then may be decreased to twice a day, coverage with ISS - to be ordered by primary team  8. HyperNa- No NaAce or NaCl in TPN, only NaPhos 15mMol given and pt started w/ 2L fluid  9. Continue as per SICU/ Surgery, will follow with you  D/w team & RD    TPN team, pager [phone]  D/w Adela Cross

## 2019-07-05 NOTE — PROGRESS NOTE ADULT - SUBJECTIVE AND OBJECTIVE BOX
SICU DAILY PROGRESS NOTE    74 F with PMHx of HFrEF s/p AICD, asthma, HTN, HLD, atrial fibrillation on eliquis p/w severe, sudden onset L sided abd pain which started yesterday morning after eating. She endorsed nausea at the time but no vomiting. She did have any fevers, chills, diarrhea or hematochezia.      CTAP w/ IV contrast showed proximal descending colon/splenic flexure diverticulitis with small amount of free fluid and 3cm low attenuation structure in the right adnexa ?fluid collection ?ovarian cyst. She developed dyspnea and wheezing after the CT scan and became hypoxic. Bedside US int he ED demonestrated a few B lines. She received 80mg total of IV lasix, 125 of solumedrol, duonebs x2 and narcan and she was admitted to MICU for management of respiratory and heart failure.     Overnight through this morning she was persistently tachycardic with BPs as low as 76/50, MAPs in low 60s. She was bolused with 500cc of crystalloid without improvement in blood pressure. She was then started on phenylephrine. Her lactate was uptrending from 2 to 2.6 to 3 on the arterial blood gas. A repeat CT scan showed "Acute diverticulitis at the splenic flexure with evidence of contained perforation. No drainable regional collection. Increased ascites in the deep pelvis." She was transferred to the SICU for further HD monitoring. Antibiotics were switched to Aztreonam and flagyl. Bedside ultrasound showed IV 1.5 minimally collapsable and echo consistent with euvolemic state. Antibiotics again switched to Ceftriaxone and flagyl. OGT switched to NGT.      24 HOUR EVENTS:  - Culture sensitives showed that Ecoli sensitive to Ceftriaxone so antibiotics narrowed from Aztreonam/Flagyl to Ceftriaxone/Flagyl  - Noted to have free water deficit so IVF switched to d5W @50  - discontinued solucortef per heart failure team  - Pre-albumin of 5    SUBJECTIVE/ROS:  [ ] A ten-point review of systems was otherwise negative except as noted.  [x] Due to altered mental status/intubation, subjective information were not able to be obtained from the patient. History was obtained, to the extent possible, from review of the chart and collateral sources of information.      NEURO  Exam: arousable to voice but not following commands  Meds: fentaNYL   Infusion. 1 MICROgram(s)/kG/Hr IV Continuous <Continuous>  HYDROmorphone  Injectable 0.5 milliGRAM(s) IV Push every 4 hours PRN breakthrough pain    [x] Adequacy of sedation and pain control has been assessed and adjusted      RESPIRATORY  RR: 25 (07-05-19 @ 01:00) (12 - 27)  SpO2: 97% (07-05-19 @ 01:00) (95% - 100%)  Exam: unlabored, clear to auscultation bilaterally  Mechanical Ventilation: Mode: AC/ CMV (Assist Control/ Continuous Mandatory Ventilation), RR (machine): 24, RR (patient): 24, TV (machine): 450, FiO2: 30, PEEP: 5, ITime: 1, MAP: 12, PIP: 40  ABG - ( 04 Jul 2019 10:51 )  pH: 7.39  /  pCO2: 42    /  pO2: 58    / HCO3: 25    / Base Excess: .5    /  SaO2: 88      Lactate: x          [N/A] Extubation Readiness Assessed  Meds: ALBUTerol/ipratropium for Nebulization 3 milliLiter(s) Nebulizer every 6 hours  buDESOnide   0.5 milliGRAM(s) Respule 0.5 milliGRAM(s) Inhalation every 12 hours        CARDIOVASCULAR  HR: 106 (07-05-19 @ 01:00) (100 - 146)  BP: 144/64 (07-05-19 @ 01:00) (105/57 - 175/72)  BP(mean): 92 (07-05-19 @ 01:00) (71 - 110)  ABP: 101/61 (07-04-19 @ 02:00) (101/61 - 101/61)  ABP(mean): 75 (07-04-19 @ 02:00) (75 - 75)  Wt(kg): --  CVP(cm H2O): --  VBG - ( 05 Jul 2019 00:24 )  pH: 7.39  /  pCO2: 44    /  pO2: 38    / HCO3: 26    / Base Excess: 2.0   /  SaO2: 68     Lactate: 1.4         Exam: sinus tachycardia 100-106  Cardiac Rhythm: sinus  Perfusion     [x]Adequate   [ ]Inadequate  Mentation   [x]Normal       [ ]Reduced  Extremities  [x]Warm         [ ]Cool  Volume Status [ ]Hypervolemic [x]Euvolemic [ ]Hypovolemic  Meds:       GI/NUTRITION  Exam: softly distended, abthera vac in place with good seal, ostomy appears viable, still no gas or stool collecting in the bag  Diet: NPO with NGT   Meds: pantoprazole  Injectable 40 milliGRAM(s) IV Push every 24 hours      GENITOURINARY  I&O's Detail    07-03 @ 07:01  -  07-04 @ 07:00  --------------------------------------------------------  IN:    dextrose 5% + lactated ringers.: 400 mL    dextrose 5% + lactated ringers.: 800 mL    dextrose 5% + lactated ringers.: 100 mL    dextrose 5% + lactated ringers.: 400 mL    dextrose 5% + sodium chloride 0.45%.: 200 mL    dextrose 5% + sodium chloride 0.45%.: 300 mL    fentaNYL Infusion.: 92.4 mL    insulin regular Infusion: 30 mL    IV PiggyBack: 450 mL    norepinephrine Infusion: 35.1 mL    Solution: 166.6 mL    Solution: 100 mL    Solution: 647.9 mL    vasopressin Infusion: 12.6 mL  Total IN: 3734.6 mL    OUT:    Ureteral Catheter: 1100 mL    VAC (Vacuum Assisted Closure) System: 500 mL  Total OUT: 1600 mL    Total NET: 2134.6 mL      07-04 @ 07:01  -  07-05 @ 01:53  --------------------------------------------------------  IN:    dextrose 5% + sodium chloride 0.45%.: 100 mL    dextrose 5%.: 850 mL    fentaNYL Infusion.: 54.6 mL    IV PiggyBack: 750 mL    Solution: 249.9 mL  Total IN: 2004.5 mL    OUT:    Nasoenteral Tube: 200 mL    Ureteral Catheter: 510 mL    VAC (Vacuum Assisted Closure) System: 300 mL  Total OUT: 1010 mL    Total NET: 994.5 mL          07-05    148<H>  |  113<H>  |  32<H>  ----------------------------<  176<H>  3.9   |  25  |  1.18    Ca    7.2<L>      05 Jul 2019 00:30  Phos  3.1     07-05  Mg     2.2     07-05    TPro  4.2<L>  /  Alb  1.7<L>  /  TBili  0.3  /  DBili  0.2  /  AST  28  /  ALT  13  /  AlkPhos  35<L>  07-04    [ ] Ching catheter, indication: N/A  Meds: ascorbic acid IVPB 1500 milliGRAM(s) IV Intermittent every 6 hours  dextrose 5%. 1000 milliLiter(s) IV Continuous <Continuous>  potassium chloride  20 mEq/100 mL IVPB 20 milliEquivalent(s) IV Intermittent once  thiamine Injectable 200 milliGRAM(s) IV Push <User Schedule>        HEMATOLOGIC  Meds: enoxaparin Injectable 40 milliGRAM(s) SubCutaneous daily    [x] VTE Prophylaxis                        8.4    13.1  )-----------( 82       ( 05 Jul 2019 00:30 )             24.8     PT/INR - ( 05 Jul 2019 00:30 )   PT: 13.8 sec;   INR: 1.19 ratio         PTT - ( 05 Jul 2019 00:30 )  PTT:31.3 sec  Transfusion     [ ] PRBC   [ ] Platelets   [ ] FFP   [ ] Cryoprecipitate      INFECTIOUS DISEASES  WBC Count: 13.1 K/uL (07-05 @ 00:30)  WBC Count: 11.6 K/uL (07-04 @ 16:07)  WBC Count: 7.7 K/uL (07-04 @ 04:09)    RECENT CULTURES:  Specimen Source: .Surgical Swab None Specimen Source:  abdomen, esw  Date/Time: 07-02 @ 10:15  Culture Results:   Moderate Escherichia coli  Moderate Alpha hemolytic strep "Susceptibilities not performed"  Gram Stain: --  Organism: Escherichia coli  Specimen Source: .Blood  Date/Time: 07-01 @ 10:04  Culture Results:   No growth to date.  Gram Stain: --  Organism: --  Specimen Source: .Urine  Date/Time: 07-01 @ 10:03  Culture Results:   No growth  Gram Stain: --  Organism: --    Meds: cefTRIAXone   IVPB 1000 milliGRAM(s) IV Intermittent every 24 hours  metroNIDAZOLE  IVPB 500 milliGRAM(s) IV Intermittent every 8 hours        ENDOCRINE  CAPILLARY BLOOD GLUCOSE      POCT Blood Glucose.: 175 mg/dL (04 Jul 2019 21:31)  POCT Blood Glucose.: 160 mg/dL (04 Jul 2019 17:45)  POCT Blood Glucose.: 172 mg/dL (04 Jul 2019 13:32)  POCT Blood Glucose.: 185 mg/dL (04 Jul 2019 05:17)    Meds: insulin lispro (HumaLOG) corrective regimen sliding scale   SubCutaneous every 4 hours        ACCESS DEVICES:  [x] Peripheral IV  [x] Central Venous Line	[x] R	[ ] L	[x] IJ	[ ] Fem	[ ] SC	Placed:   [ ] Arterial Line		[ ] R	[ ] L	[ ] Fem	[ ] Rad	[ ] Ax	Placed:   [ ] PICC:					[ ] Mediport  [x] Urinary Catheter, Date Placed:   [x] Necessity of urinary, arterial, and venous catheters discussed    OTHER MEDICATIONS:  chlorhexidine 0.12% Liquid 15 milliLiter(s) Oral Mucosa <User Schedule>  chlorhexidine 2% Cloths 1 Application(s) Topical daily      CODE STATUS: Full code

## 2019-07-05 NOTE — PROGRESS NOTE ADULT - ASSESSMENT
ASSESSMENT:  74F admitted with abdominal pain and exam findings consistent with peritonitis secondary to acute diverticulitis at the splenic flexure with evidence of contained perforation now POD3 s/p Exploratory laparotomy, left hemicolectomy, end colostomy, and ABThera Vac placement and POD2 from RTOR for abdominal washout and abthera vac placement, off pressors.     PLAN:  NEURO: Intubated and sedated on fentanyl  - Pain control with dilaudid and fentanyl    RESPIRATORY: intubated, PMHx of Asthma  - F/u AM chest Xray  - F/u serial ABGS  - Wean mechanical ventilation  - Discontinued steroids yesterday    CARDIOVASCULAR:  off pressors  - Cautious fluid resuscitation, appreciate heart failure recommendations  - Hold home anti-hypertensives  - Trend lactate    GI/NUTRITION: s/p ex lap and L hemicolectomy with end colostomy and ABThera VAC placement and washout  - Plan to return to OR today for washout and possible abdominal wall closure  - NPO, IVF, NGT    - monitor for ostomy function    GENITOURINARY/RENAL: DIANNA (improving)  - Renally dose antibiotics  - Trend K+    HEMATOLOGIC: H/H stable  - Patient does NOT accept blood products  - Trend CBCs  - Hold ASA and A/C    INFECTIOUS DISEASE: Colonic perforation, cultures + for E.coli  - Ceftriaxone and Flagyl     ENDOCRINE:  - ISS, HgA1C  - Off solucortef

## 2019-07-05 NOTE — PROGRESS NOTE ADULT - SUBJECTIVE AND OBJECTIVE BOX
Interval Events:    No acute events overnight    S: Intubated     O: Vital Signs  T(C): 36.9 (07-04 @ 23:00), Max: 37.3 (07-04 @ 15:00)  HR: 104 (07-05 @ 04:51) (100 - 146)  BP: 154/69 (07-05 @ 04:00) (113/55 - 175/72)  RR: 26 (07-05 @ 04:00) (12 - 27)  SpO2: 97% (07-05 @ 04:51) (95% - 100%)  07-03-19 @ 07:01  -  07-04-19 @ 07:00  --------------------------------------------------------  IN: 3734.6 mL / OUT: 1600 mL / NET: 2134.6 mL    07-04-19 @ 07:01  -  07-05-19 @ 05:53  --------------------------------------------------------  IN: 2321.3 mL / OUT: 1085 mL / NET: 1236.3 mL      General: Patient intubated and obtunded  Resp: airway patent, respirations unlabored  HEENT: IJ in place  CVS: regular rate and rhythm  Abdomen: soft, appropriately tender, nondistended starr in place draining yellow urine  Extremities: no edema  Skin: warm, dry, appropriate color                          8.4    13.1  )-----------( 82       ( 05 Jul 2019 00:30 )             24.8   07-05    148<H>  |  113<H>  |  32<H>  ----------------------------<  176<H>  3.9   |  25  |  1.18    Ca    7.2<L>      05 Jul 2019 00:30  Phos  3.1     07-05  Mg     2.2     07-05    TPro  4.2<L>  /  Alb  1.7<L>  /  TBili  0.3  /  DBili  0.2  /  AST  28  /  ALT  13  /  AlkPhos  35<L>  07-04

## 2019-07-05 NOTE — PROGRESS NOTE ADULT - ATTENDING COMMENTS
Patient seen and examined and agree with resident note.    Patient POD #4 s/p Hartmanns procedure with feculent peritonitis and patient taken back today for another washout.  She remains critically ill in septic shock.    1) Acute respiratory failure s/p trauma- continue mechanical ventilation at this time.  CXR reviewed- right sided basilar atelectasis   -will continue with SBT when clinically appropriate after patient returns from Or if appropriate  Continue oxygen supplementation with goal SpO2> 92%    2) Septic shock secondary to perforated diverticulitis - hypotension improved.   -steroids discontinued yesterday      3) Acute kidney insufficiency- resolving  -still oliguric but improved   -will continue to monitor    4) Hyperglycemia with IDDM type 2- on insulin sliding scale, will continue to monitor closely  -goal BG < 180    5) Hypernatremia - improving Thrombocytopenia

## 2019-07-05 NOTE — PRE-ANESTHESIA EVALUATION ADULT - MALLAMPATI CLASS
Class III - visualization of the soft palate and the base of the uvula
Class III - visualization of the soft palate and the base of the uvula
intubated

## 2019-07-05 NOTE — CHART NOTE - NSCHARTNOTEFT_GEN_A_CORE
S: Patient intubated and sedated  O: Vital Signs  T(C): 37.1 (07-05 @ 10:49), Max: 37.1 (07-04 @ 19:00)  HR: 100 (07-05 @ 17:00) (98 - 111)  BP: 117/55 (07-05 @ 17:00) (98/52 - 154/69)  RR: 25 (07-05 @ 17:00) (20 - 26)  SpO2: 100% (07-05 @ 17:00) (95% - 100%)  07-04-19 @ 07:01  -  07-05-19 @ 07:00  --------------------------------------------------------  IN: 2579.7 mL / OUT: 1155 mL / NET: 1424.7 mL    07-05-19 @ 07:01  -  07-05-19 @ 18:02  --------------------------------------------------------  IN: 500 mL / OUT: 1100 mL / NET: -600 mL      General: Patient intubated and sedated, IJ line in place  Resp: airway patent, respirations unlabored  CVS: regular rate and rhythm  Abdomen: soft, appropriately tender to palpation.  abthera in place draining serosanguineous fluid  Extremities: no edema  Skin: warm, dry, appropriate color                          8.0    15.6  )-----------( 104      ( 05 Jul 2019 11:16 )             27.2   07-05    149<H>  |  113<H>  |  34<H>  ----------------------------<  129<H>  3.9   |  24  |  1.22    Ca    7.7<L>      05 Jul 2019 11:16  Phos  3.1     07-05  Mg     2.4     07-05    TPro  4.2<L>  /  Alb  1.7<L>  /  TBili  0.3  /  DBili  0.2  /  AST  28  /  ALT  13  /  AlkPhos  35<L>  07-04    Assessment:    Ms Dolan is a 74 yof with PMH of HFrEF with AICD, asthma, HTN, HLD, atrial fibrillation on eliquis presenting with CT demonstrated new contained perforation 2/2 diverticulits.  She is doing well after her second abdominal washout.    Plan:    Keep patient intubated  Due to swelling, diuresis with lasix with goal outputs of 1-2L.  Plan for repeat washout and likely closure on Monday  Continue excellent care per SICU

## 2019-07-05 NOTE — PROGRESS NOTE ADULT - ASSESSMENT
Ms Dolan is a 74 yof with PMH of HFrEF with AICD, asthma, HTN, HLD, atrial fibrillation on eliquis presenting with CT demonstrated new contained perforation 2/2 diverticulitis.  POD 2 from her first abdominal washout    - Takeback to OR for abdominal washout planned for Friday AM  - NPO/IVF  - Continue aztreonam + GARCÍA

## 2019-07-05 NOTE — CONSULT NOTE ADULT - SUBJECTIVE AND OBJECTIVE BOX
NUTRITION SUPPORT / TPN CONSULT NOTE    HPI:  74 F with PMHx of HFrEF s/p AICD, asthma, HTN, HLD, atrial fibrillation on eliquis p/w severe, sudden onset L sided abd pain that started on AM prior to admission. Patient had just finished her meal when she developed left sided abdominal pain. She reports nausea but no vomiting. She denies any fevers, chills, diarrhea or hematochezia. Patient denies having abdominal pain like this in the past.     CTAP w/ IV contrast showed proximal descending colon/splenic flexure diverticulitis with small amount of free fluid and 3cm low attenuation structure in the right adnexa ?fluid collection ?ovarian cyst. She developed dyspnea and wheezing after the CT scan and became hypoxic. Bedside US int he ED demonestrated a few B lines. She received 80mg total of IV lasix, 125 of solumedrol, duonebs x2 and narcan and she was admitted to MICU for management of respiratory and heart failure.  Pt was persistently tachycardic with BPs as low as 76/50, MAPs in low 60s. She was bolused with 500cc of crystalloid without improvement in blood pressure. She was then started on phenylephrine. Her lactate was uptrending from 2 to 2.6 to 3 on the arterial blood gas. A repeat CT scan showed "Acute diverticulitis at the splenic flexure with evidence of contained perforation. No drainable regional collection. Increased ascites in the deep pelvis." She was transferred to the SICU for further HD monitoring. Antibiotics were switched to Aztreonam and flagyl. Bedside ultrasound showed IV 1.5 minimally collapsable and echo consistent with euvolemic state. Antibiotics again switched to Ceftriaxone and flagyl. OGT switched to NGT.    Pt was brought to the OR 7/2 where perforation of distal transverse colon and feculent peritonitis noted w/ significant small bowel edema obviating abdominal wall closure.  They were able to bring out transverse end colostomy.  Pt was brought to SICU to continue acute care.  Pt was RTOR for an uneventful Ex Lap & washout the next day.  This am pt was RTOR purulent ascites was noted.  Peritoneal cavity was copiously irrigated and suctioned. All bowel noted to be viable w/colonic stump intact.  The team was unable to close abdomen without tension and ABthera was placed.    Considering pt continued need for critical care and bowel rest TPN consult was called.  Pt has not eaten in 6days.  Pt according to daughter at bedside usually has good appetite.  No F/C/S noted.  (+)NGT.  No flatus, nor BM noted    24 HOUR EVENTS:  - Culture sensitives showed that Ecoli sensitive to Ceftriaxone so antibiotics narrowed from Aztreonam/Flagyl to Ceftriaxone/Flagyl  - Noted to have free water deficit so IVF switched to d5W @50  - discontinued solucortef per heart failure team  - Pre-albumin of 5        Current Diet: Diet, NPO (07-02-19 @ 05:11)    Appetite: [  x]Poor [  ]Adequate [  ]Good  Caloric intake:  [   ]  Adequate   [x   ] Inadequate      MEDICATIONS  (STANDING):  ALBUTerol/ipratropium for Nebulization 3 milliLiter(s) Nebulizer every 6 hours  ascorbic acid IVPB 1500 milliGRAM(s) IV Intermittent every 6 hours  buDESOnide   0.5 milliGRAM(s) Respule 0.5 milliGRAM(s) Inhalation every 12 hours  cefTRIAXone   IVPB 1000 milliGRAM(s) IV Intermittent every 24 hours  chlorhexidine 0.12% Liquid 15 milliLiter(s) Oral Mucosa <User Schedule>  chlorhexidine 2% Cloths 1 Application(s) Topical daily  dextrose 5%. 1000 milliLiter(s) (50 mL/Hr) IV Continuous <Continuous>  enoxaparin Injectable 40 milliGRAM(s) SubCutaneous daily  furosemide   Injectable 40 milliGRAM(s) IV Push once  insulin lispro (HumaLOG) corrective regimen sliding scale   SubCutaneous every 4 hours  metroNIDAZOLE  IVPB 500 milliGRAM(s) IV Intermittent every 8 hours  pantoprazole  Injectable 40 milliGRAM(s) IV Push every 24 hours  Parenteral Nutrition - Adult 1 Each (83 mL/Hr) TPN Continuous <Continuous>  thiamine Injectable 200 milliGRAM(s) IV Push <User Schedule>    MEDICATIONS  (PRN):  HYDROmorphone  Injectable 0.5 milliGRAM(s) IV Push every 3 hours PRN pain      PAST MEDICAL & SURGICAL HISTORY:  Vertigo  Atrial flutter  Diverticulitis  Cardiomyopathy  Kidney stone  HTN - Hypertension  Gout  Diabetes  Congestive Heart Failure  Asthma  S/P cholecystectomy  s/p AICD (Automatic Cardioverter/Defibrillator) Present: inserted in Aug, 2008. ( [facility])     FAMILY HISTORY:  Family history of brain tumor      Social History:  single, lives w/daughter, has a son also; Denies current smoking, ETOH, drugs    ALLERGIES  Coreg (Other)  digoxin (Other; Short breath (Mild to Mod))  Lopressor (Short breath)  metoprolol (Other)  penicillins (Hives)  Solu-Medrol (Other (Mild to Mod))      REVIEW OF SYSTEMS  --------------------------------------------------------------------------------  ROS: unable to offer    VITALS  T(C): 37.1 (07-05-19 @ 10:49), Max: 37.3 (07-04-19 @ 15:00)  HR: 100 (07-05-19 @ 13:00) (100 - 112)  BP: 109/58 (07-05-19 @ 13:00) (109/58 - 154/69)  RR: 23 (07-05-19 @ 13:00) (20 - 26)  SpO2: 100% (07-05-19 @ 13:00) (95% - 100%)  I&O's Detail    04 Jul 2019 07:01  -  05 Jul 2019 07:00  --------------------------------------------------------  IN:    dextrose 5% + sodium chloride 0.45%.: 100 mL    dextrose 5%.: 1150 mL    fentaNYL Infusion.: 79.8 mL    IV PiggyBack: 1000 mL    Solution: 249.9 mL  Total IN: 2579.7 mL    OUT:    Nasoenteral Tube: 200 mL    Ureteral Catheter: 655 mL    VAC (Vacuum Assisted Closure) System: 300 mL  Total OUT: 1155 mL    Total NET: 1424.7 mL      05 Jul 2019 07:01  -  05 Jul 2019 14:12  --------------------------------------------------------  IN:    dextrose 5%.: 300 mL  Total IN: 300 mL    OUT:    Ureteral Catheter: 600 mL  Total OUT: 600 mL    Total NET: -300 mL        Mode: AC/ CMV (Assist Control/ Continuous Mandatory Ventilation)  RR (machine): 24  TV (machine): 450  FiO2: 30  PEEP: 5  ITime: 0.7  MAP: 12  PIP: 38      PHYSICAL EXAM  --------------------------------------------------------------------------------    Physical Exam:  	Gen: Guarded, but stable, WN/ WD/ WG, sedated, intubated  	HEENT: NC/AT, PERRL, mucosa moist, supple neck, clear oropharynx  	Pulm: CTA B/L  	CV: RRR, S1S2; no rub  	Back: No spinal or CVA tenderness; no sacral edema  	GI: +softly distended, nontender, no BS                  (+)Ostomy pink & viable, no air nor stool                  ABthera intact w/ good seal              :  (+)Ching              MSK/Vascular: Passive ROM x4,  no clubbing, cyanosis, nor edema  	Neuro: unable to assess, attempts to open eyes when call her name  	Skin: Warm, good turgor, without rashes  	      LABS:                        8.0    15.6  )-----------( 104      ( 05 Jul 2019 11:16 )             27.2     07-05    149<H>  |  113<H>  |  34<H>  ----------------------------<  129<H>  3.9   |  24  |  1.22    Ca    7.7<L>      05 Jul 2019 11:16  Phos  3.1     07-05  Mg     2.4     07-05    TPro  4.2<L>  /  Alb  1.7<L>  /  TBili  0.3  /  DBili  0.2  /  AST  28  /  ALT  13  /  AlkPhos  35<L>  07-04    Ionized Calcium Levels:   Blood Gas Calcium, Ionized - Venous: 1.14 mmoL/L (07.05.19 @ 11:16)  Blood Gas Calcium, Ionized - Venous: 1.13 mmoL/L (07.05.19 @ 00:24)  Blood Gas Calcium, Ionized - Venous: 1.08 mmoL/L (07.04.19 @ 15:41)    Hemoglobin A1C, Whole Blood (06.01.19 @ 11:11)    Hemoglobin A1C, Whole Blood: 7.2: Method: Immunoassay       Reference Range                4.0-5.6%       High risk (prediabetic)        5.7-6.4%       Diabetic, diagnostic             >=6.5%       ADA diabetic treatment goal       <7.0%  The Hemoglobin A1c testing is NGSP-certified.Reference ranges are based  upon the 2010 recommendations of  the American Diabetes Association.  Interpretation may vary for children  and adolescents. %      CAPILLARY BLOOD GLUCOSE  POCT Blood Glucose.: 168 mg/dL (05 Jul 2019 06:21)  POCT Blood Glucose.: 175 mg/dL (04 Jul 2019 21:31)  POCT Blood Glucose.: 160 mg/dL (04 Jul 2019 17:45)    PT/INR - ( 05 Jul 2019 11:16 )   PT: 14.1 sec;   INR: 1.23 ratio    PTT - ( 05 Jul 2019 11:16 )  PTT:27.1 sec    ABG - ( 04 Jul 2019 10:51 )  pH, Arterial: 7.39  pH, Blood: x     /  pCO2: 42    /  pO2: 58    / HCO3: 25    / Base Excess: .5    /  SaO2: 88        Prealbumin, Serum (07.04.19 @ 22:45)    Prealbumin, Serum: 5 mg/dL        RADIOLOGY:  < from: Xray Chest 1 View- PORTABLE-Urgent (07.05.19 @ 11:23) >  Impression:    The heart is slightly enlarged. Right pleural effusion. The left   costophrenic angle is not included in this study. Endotracheal tube is in   good position. A pacer is in good position as well. A central line seen   on the right and the tip is in superior vena cava. No pneumothorax.      < from: CT Abdomen and Pelvis No Cont (07.01.19 @ 14:57) >    PROCEDURE:   CT of the Abdomen and Pelvis was performed without intravenous contrast.   Intravenous contrast: None.  Oral contrast: None.  Sagittal and coronal reformats were performed.    FINDINGS:    LOWER CHEST: Mild bronchiectasis with subsegmental atelectasis in the   right middle lobe. Partially visualized iliac CT wires.    LIVER: Within normal limits.  BILE DUCTS: Normal caliber.  GALLBLADDER: Within normal limits.  SPLEEN: Within normal limits.  PANCREAS: Within normal limits.  ADRENALS: Within normal limits.  KIDNEYS/URETERS:Retention of contrast within the kidneys from prior CT 1   day earlier suggestive of acute tubular necrosis.    BLADDER: Collapsed or on a Ching catheter.  REPRODUCTIVE ORGANS: Small calcified fibroids.    BOWEL/PERITONEUM: No bowel obstruction. Diffuse colonic diverticulosis   with inflammation at the level of the splenic flexure. A few extraluminal   droplets of air adjacent to the inflamed colon as well as small fluid   without well-defined collection. Small ascites in the deep pelvis with   interval increase from prior imaging one day earlier.  VESSELS:  Atherosclerotic changes.  RETROPERITONEUM: No lymphadenopathy.    ABDOMINAL WALL: Within normal limits.  BONES: Degenerative changes.    IMPRESSION:     Acute diverticulitis at the splenic flexure with evidence of contained   perforation.     No drainable regional collection.    Increased ascites in the deep pelvis.    Retention of contrast within the kidneys from CT 1 day biliary suggestive   of acute tubular necrosis.

## 2019-07-06 LAB
-  AMPICILLIN: SIGNIFICANT CHANGE UP
-  TETRACYCLINE: SIGNIFICANT CHANGE UP
-  VANCOMYCIN: SIGNIFICANT CHANGE UP
ANION GAP SERPL CALC-SCNC: 10 MMOL/L — SIGNIFICANT CHANGE UP (ref 5–17)
ANION GAP SERPL CALC-SCNC: 10 MMOL/L — SIGNIFICANT CHANGE UP (ref 5–17)
ANION GAP SERPL CALC-SCNC: 9 MMOL/L — SIGNIFICANT CHANGE UP (ref 5–17)
APTT BLD: 27.5 SEC — SIGNIFICANT CHANGE UP (ref 27.5–36.3)
BUN SERPL-MCNC: 33 MG/DL — HIGH (ref 7–23)
BUN SERPL-MCNC: 33 MG/DL — HIGH (ref 7–23)
BUN SERPL-MCNC: 34 MG/DL — HIGH (ref 7–23)
CA-I BLD-SCNC: 1.1 MMOL/L — LOW (ref 1.12–1.3)
CALCIUM SERPL-MCNC: 7.2 MG/DL — LOW (ref 8.4–10.5)
CALCIUM SERPL-MCNC: 7.6 MG/DL — LOW (ref 8.4–10.5)
CALCIUM SERPL-MCNC: 7.9 MG/DL — LOW (ref 8.4–10.5)
CHLORIDE SERPL-SCNC: 111 MMOL/L — HIGH (ref 96–108)
CHLORIDE SERPL-SCNC: 112 MMOL/L — HIGH (ref 96–108)
CHLORIDE SERPL-SCNC: 114 MMOL/L — HIGH (ref 96–108)
CHOLEST SERPL-MCNC: 31 MG/DL — SIGNIFICANT CHANGE UP (ref 10–199)
CO2 SERPL-SCNC: 24 MMOL/L — SIGNIFICANT CHANGE UP (ref 22–31)
CO2 SERPL-SCNC: 25 MMOL/L — SIGNIFICANT CHANGE UP (ref 22–31)
CO2 SERPL-SCNC: 26 MMOL/L — SIGNIFICANT CHANGE UP (ref 22–31)
CREAT SERPL-MCNC: 0.93 MG/DL — SIGNIFICANT CHANGE UP (ref 0.5–1.3)
CREAT SERPL-MCNC: 1 MG/DL — SIGNIFICANT CHANGE UP (ref 0.5–1.3)
CREAT SERPL-MCNC: 1.11 MG/DL — SIGNIFICANT CHANGE UP (ref 0.5–1.3)
CULTURE RESULTS: SIGNIFICANT CHANGE UP
CULTURE RESULTS: SIGNIFICANT CHANGE UP
GAS PNL BLDV: SIGNIFICANT CHANGE UP
GLUCOSE BLDC GLUCOMTR-MCNC: 181 MG/DL — HIGH (ref 70–99)
GLUCOSE BLDC GLUCOMTR-MCNC: 183 MG/DL — HIGH (ref 70–99)
GLUCOSE BLDC GLUCOMTR-MCNC: 186 MG/DL — HIGH (ref 70–99)
GLUCOSE BLDC GLUCOMTR-MCNC: 188 MG/DL — HIGH (ref 70–99)
GLUCOSE BLDC GLUCOMTR-MCNC: 190 MG/DL — HIGH (ref 70–99)
GLUCOSE BLDC GLUCOMTR-MCNC: 193 MG/DL — HIGH (ref 70–99)
GLUCOSE BLDC GLUCOMTR-MCNC: 214 MG/DL — HIGH (ref 70–99)
GLUCOSE SERPL-MCNC: 170 MG/DL — HIGH (ref 70–99)
GLUCOSE SERPL-MCNC: 177 MG/DL — HIGH (ref 70–99)
GLUCOSE SERPL-MCNC: 219 MG/DL — HIGH (ref 70–99)
HCT VFR BLD CALC: 25.3 % — LOW (ref 34.5–45)
HDLC SERPL-MCNC: 15 MG/DL — LOW
HGB BLD-MCNC: 8.1 G/DL — LOW (ref 11.5–15.5)
INR BLD: 1.2 RATIO — HIGH (ref 0.88–1.16)
LIPID PNL WITH DIRECT LDL SERPL: 11 MG/DL — SIGNIFICANT CHANGE UP
MAGNESIUM SERPL-MCNC: 2.2 MG/DL — SIGNIFICANT CHANGE UP (ref 1.6–2.6)
MAGNESIUM SERPL-MCNC: 2.2 MG/DL — SIGNIFICANT CHANGE UP (ref 1.6–2.6)
MAGNESIUM SERPL-MCNC: 2.3 MG/DL — SIGNIFICANT CHANGE UP (ref 1.6–2.6)
MCHC RBC-ENTMCNC: 31.2 PG — SIGNIFICANT CHANGE UP (ref 27–34)
MCHC RBC-ENTMCNC: 31.9 GM/DL — LOW (ref 32–36)
MCV RBC AUTO: 97.5 FL — SIGNIFICANT CHANGE UP (ref 80–100)
METHOD TYPE: SIGNIFICANT CHANGE UP
PHOSPHATE SERPL-MCNC: 3.2 MG/DL — SIGNIFICANT CHANGE UP (ref 2.5–4.5)
PHOSPHATE SERPL-MCNC: 3.5 MG/DL — SIGNIFICANT CHANGE UP (ref 2.5–4.5)
PHOSPHATE SERPL-MCNC: 3.8 MG/DL — SIGNIFICANT CHANGE UP (ref 2.5–4.5)
PLATELET # BLD AUTO: 105 K/UL — LOW (ref 150–400)
POTASSIUM SERPL-MCNC: 3.8 MMOL/L — SIGNIFICANT CHANGE UP (ref 3.5–5.3)
POTASSIUM SERPL-MCNC: 4 MMOL/L — SIGNIFICANT CHANGE UP (ref 3.5–5.3)
POTASSIUM SERPL-MCNC: 4.1 MMOL/L — SIGNIFICANT CHANGE UP (ref 3.5–5.3)
POTASSIUM SERPL-SCNC: 3.8 MMOL/L — SIGNIFICANT CHANGE UP (ref 3.5–5.3)
POTASSIUM SERPL-SCNC: 4 MMOL/L — SIGNIFICANT CHANGE UP (ref 3.5–5.3)
POTASSIUM SERPL-SCNC: 4.1 MMOL/L — SIGNIFICANT CHANGE UP (ref 3.5–5.3)
PROTHROM AB SERPL-ACNC: 13.8 SEC — HIGH (ref 10–12.9)
RBC # BLD: 2.6 M/UL — LOW (ref 3.8–5.2)
RBC # FLD: 14.8 % — HIGH (ref 10.3–14.5)
SODIUM SERPL-SCNC: 146 MMOL/L — HIGH (ref 135–145)
SODIUM SERPL-SCNC: 147 MMOL/L — HIGH (ref 135–145)
SODIUM SERPL-SCNC: 148 MMOL/L — HIGH (ref 135–145)
SPECIMEN SOURCE: SIGNIFICANT CHANGE UP
SPECIMEN SOURCE: SIGNIFICANT CHANGE UP
TOTAL CHOLESTEROL/HDL RATIO MEASUREMENT: 2.1 RATIO — LOW (ref 3.3–7.1)
TRIGL SERPL-MCNC: 23 MG/DL — SIGNIFICANT CHANGE UP (ref 10–149)
TROPONIN T, HIGH SENSITIVITY RESULT: 22 NG/L — SIGNIFICANT CHANGE UP (ref 0–51)
WBC # BLD: 16.9 K/UL — HIGH (ref 3.8–10.5)
WBC # FLD AUTO: 16.9 K/UL — HIGH (ref 3.8–10.5)

## 2019-07-06 PROCEDURE — 71045 X-RAY EXAM CHEST 1 VIEW: CPT | Mod: 26

## 2019-07-06 PROCEDURE — 99233 SBSQ HOSP IP/OBS HIGH 50: CPT

## 2019-07-06 PROCEDURE — 99291 CRITICAL CARE FIRST HOUR: CPT

## 2019-07-06 PROCEDURE — 93010 ELECTROCARDIOGRAM REPORT: CPT

## 2019-07-06 RX ORDER — METOPROLOL TARTRATE 50 MG
2.5 TABLET ORAL EVERY 8 HOURS
Refills: 0 | Status: DISCONTINUED | OUTPATIENT
Start: 2019-07-06 | End: 2019-07-06

## 2019-07-06 RX ORDER — CALCIUM GLUCONATE 100 MG/ML
2 VIAL (ML) INTRAVENOUS ONCE
Refills: 0 | Status: COMPLETED | OUTPATIENT
Start: 2019-07-06 | End: 2019-07-06

## 2019-07-06 RX ORDER — FUROSEMIDE 40 MG
40 TABLET ORAL EVERY 8 HOURS
Refills: 0 | Status: COMPLETED | OUTPATIENT
Start: 2019-07-06 | End: 2019-07-07

## 2019-07-06 RX ORDER — FUROSEMIDE 40 MG
20 TABLET ORAL ONCE
Refills: 0 | Status: COMPLETED | OUTPATIENT
Start: 2019-07-06 | End: 2019-07-06

## 2019-07-06 RX ORDER — CEFTRIAXONE 500 MG/1
1000 INJECTION, POWDER, FOR SOLUTION INTRAMUSCULAR; INTRAVENOUS EVERY 24 HOURS
Refills: 0 | Status: DISCONTINUED | OUTPATIENT
Start: 2019-07-06 | End: 2019-07-09

## 2019-07-06 RX ORDER — ELECTROLYTE SOLUTION,INJ
1 VIAL (ML) INTRAVENOUS
Refills: 0 | Status: DISCONTINUED | OUTPATIENT
Start: 2019-07-06 | End: 2019-07-06

## 2019-07-06 RX ORDER — METOPROLOL TARTRATE 50 MG
2.5 TABLET ORAL EVERY 12 HOURS
Refills: 0 | Status: DISCONTINUED | OUTPATIENT
Start: 2019-07-06 | End: 2019-07-09

## 2019-07-06 RX ORDER — I.V. FAT EMULSION 20 G/100ML
16.7 EMULSION INTRAVENOUS
Qty: 40 | Refills: 0 | Status: DISCONTINUED | OUTPATIENT
Start: 2019-07-06 | End: 2019-07-07

## 2019-07-06 RX ORDER — FUROSEMIDE 40 MG
20 TABLET ORAL EVERY 8 HOURS
Refills: 0 | Status: DISCONTINUED | OUTPATIENT
Start: 2019-07-06 | End: 2019-07-06

## 2019-07-06 RX ADMIN — Medication 2: at 10:27

## 2019-07-06 RX ADMIN — Medication 200 GRAM(S): at 04:44

## 2019-07-06 RX ADMIN — CHLORHEXIDINE GLUCONATE 15 MILLILITER(S): 213 SOLUTION TOPICAL at 06:32

## 2019-07-06 RX ADMIN — CHLORHEXIDINE GLUCONATE 15 MILLILITER(S): 213 SOLUTION TOPICAL at 17:59

## 2019-07-06 RX ADMIN — Medication 0.5 MILLIGRAM(S): at 05:30

## 2019-07-06 RX ADMIN — Medication 2.5 MILLIGRAM(S): at 14:09

## 2019-07-06 RX ADMIN — Medication 153 MILLIGRAM(S): at 06:29

## 2019-07-06 RX ADMIN — Medication 100 MILLIGRAM(S): at 08:09

## 2019-07-06 RX ADMIN — Medication 20 MILLIGRAM(S): at 14:06

## 2019-07-06 RX ADMIN — HYDROMORPHONE HYDROCHLORIDE 0.5 MILLIGRAM(S): 2 INJECTION INTRAMUSCULAR; INTRAVENOUS; SUBCUTANEOUS at 08:09

## 2019-07-06 RX ADMIN — Medication 2: at 02:02

## 2019-07-06 RX ADMIN — HYDROMORPHONE HYDROCHLORIDE 0.5 MILLIGRAM(S): 2 INJECTION INTRAMUSCULAR; INTRAVENOUS; SUBCUTANEOUS at 18:43

## 2019-07-06 RX ADMIN — Medication 3 MILLILITER(S): at 17:35

## 2019-07-06 RX ADMIN — CHLORHEXIDINE GLUCONATE 1 APPLICATION(S): 213 SOLUTION TOPICAL at 06:31

## 2019-07-06 RX ADMIN — Medication 100 MILLIGRAM(S): at 16:26

## 2019-07-06 RX ADMIN — Medication 100 MILLIGRAM(S): at 01:06

## 2019-07-06 RX ADMIN — HYDROMORPHONE HYDROCHLORIDE 0.5 MILLIGRAM(S): 2 INJECTION INTRAMUSCULAR; INTRAVENOUS; SUBCUTANEOUS at 04:12

## 2019-07-06 RX ADMIN — Medication 2: at 18:00

## 2019-07-06 RX ADMIN — Medication 4: at 22:06

## 2019-07-06 RX ADMIN — PANTOPRAZOLE SODIUM 40 MILLIGRAM(S): 20 TABLET, DELAYED RELEASE ORAL at 06:30

## 2019-07-06 RX ADMIN — Medication 0.5 MILLIGRAM(S): at 17:36

## 2019-07-06 RX ADMIN — Medication 20 MILLIGRAM(S): at 22:06

## 2019-07-06 RX ADMIN — I.V. FAT EMULSION 16.7 ML/HR: 20 EMULSION INTRAVENOUS at 17:58

## 2019-07-06 RX ADMIN — Medication 2: at 15:40

## 2019-07-06 RX ADMIN — CHLORHEXIDINE GLUCONATE 15 MILLILITER(S): 213 SOLUTION TOPICAL at 22:05

## 2019-07-06 RX ADMIN — Medication 3 MILLILITER(S): at 11:43

## 2019-07-06 RX ADMIN — HYDROMORPHONE HYDROCHLORIDE 0.5 MILLIGRAM(S): 2 INJECTION INTRAMUSCULAR; INTRAVENOUS; SUBCUTANEOUS at 21:51

## 2019-07-06 RX ADMIN — HYDROMORPHONE HYDROCHLORIDE 0.5 MILLIGRAM(S): 2 INJECTION INTRAMUSCULAR; INTRAVENOUS; SUBCUTANEOUS at 18:20

## 2019-07-06 RX ADMIN — Medication 20 MILLIGRAM(S): at 00:24

## 2019-07-06 RX ADMIN — I.V. FAT EMULSION 16.7 ML/HR: 20 EMULSION INTRAVENOUS at 22:05

## 2019-07-06 RX ADMIN — CEFTRIAXONE 100 MILLIGRAM(S): 500 INJECTION, POWDER, FOR SOLUTION INTRAMUSCULAR; INTRAVENOUS at 06:26

## 2019-07-06 RX ADMIN — CHLORHEXIDINE GLUCONATE 1 APPLICATION(S): 213 SOLUTION TOPICAL at 14:10

## 2019-07-06 RX ADMIN — Medication 1 EACH: at 22:05

## 2019-07-06 RX ADMIN — Medication 3 MILLILITER(S): at 05:31

## 2019-07-06 RX ADMIN — ENOXAPARIN SODIUM 40 MILLIGRAM(S): 100 INJECTION SUBCUTANEOUS at 14:06

## 2019-07-06 RX ADMIN — Medication 1 EACH: at 17:58

## 2019-07-06 RX ADMIN — HYDROMORPHONE HYDROCHLORIDE 0.5 MILLIGRAM(S): 2 INJECTION INTRAMUSCULAR; INTRAVENOUS; SUBCUTANEOUS at 10:09

## 2019-07-06 RX ADMIN — Medication 2: at 06:46

## 2019-07-06 RX ADMIN — HYDROMORPHONE HYDROCHLORIDE 0.5 MILLIGRAM(S): 2 INJECTION INTRAMUSCULAR; INTRAVENOUS; SUBCUTANEOUS at 21:36

## 2019-07-06 RX ADMIN — CHLORHEXIDINE GLUCONATE 15 MILLILITER(S): 213 SOLUTION TOPICAL at 14:06

## 2019-07-06 RX ADMIN — Medication 2.5 MILLIGRAM(S): at 17:58

## 2019-07-06 RX ADMIN — HYDROMORPHONE HYDROCHLORIDE 0.5 MILLIGRAM(S): 2 INJECTION INTRAMUSCULAR; INTRAVENOUS; SUBCUTANEOUS at 04:27

## 2019-07-06 NOTE — PROGRESS NOTE ADULT - SUBJECTIVE AND OBJECTIVE BOX
Interval History:  s/p washout yesterday but unable to be closed d/t anasarca  started on TPN  being diuresed  off pressors    Medications:  ALBUTerol/ipratropium for Nebulization 3 milliLiter(s) Nebulizer every 6 hours  ascorbic acid IVPB 1500 milliGRAM(s) IV Intermittent every 6 hours  buDESOnide   0.5 milliGRAM(s) Respule 0.5 milliGRAM(s) Inhalation every 12 hours  cefTRIAXone   IVPB 1000 milliGRAM(s) IV Intermittent every 24 hours  chlorhexidine 0.12% Liquid 15 milliLiter(s) Oral Mucosa <User Schedule>  chlorhexidine 2% Cloths 1 Application(s) Topical daily  enoxaparin Injectable 40 milliGRAM(s) SubCutaneous daily  fat emulsion (Fish Oil and Plant Based) 20% Infusion 16.7 mL/Hr IV Continuous <Continuous>  furosemide   Injectable 20 milliGRAM(s) IV Push every 8 hours  HYDROmorphone  Injectable 0.5 milliGRAM(s) IV Push every 3 hours PRN  insulin lispro (HumaLOG) corrective regimen sliding scale   SubCutaneous every 4 hours  metoprolol tartrate Injectable 2.5 milliGRAM(s) IV Push every 12 hours  metroNIDAZOLE  IVPB 500 milliGRAM(s) IV Intermittent every 8 hours  pantoprazole  Injectable 40 milliGRAM(s) IV Push every 24 hours  Parenteral Nutrition - Adult 1 Each TPN Continuous <Continuous>  Parenteral Nutrition - Adult 1 Each TPN Continuous <Continuous>      Vitals:  T(C): 37.1 (19 @ 11:00), Max: 37.1 (19 @ 11:00)  HR: 100 (19 @ 16:14) (94 - 118)  BP: 113/59 (19 @ 15:00) (91/52 - 137/61)  BP(mean): 77 (19 @ 15:00) (65 - 88)  RR: 24 (19 @ 15:00) (22 - 24)  SpO2: 100% (19 @ 16:14) (73% - 100%)    Daily     Daily Weight in k.8 (2019 00:00)    Weight (kg): 83.2 ( @ 09:44)    I&O's Summary    2019 07:01  -  2019 07:00  --------------------------------------------------------  IN: 1890.7 mL / OUT: 3110 mL / NET: -1219.3 mL    2019 07:  -  2019 17:28  --------------------------------------------------------  IN: 830 mL / OUT: 1140 mL / NET: -310 mL    Physical Exam:  Appearance: intubated/sedated  HEENT: PERRL  Neck: JVD approx 8-10  Cardiovascular: Normal S1 S2, No murmurs/rubs/gallops  Respiratory: Clear to auscultation bilaterally  Gastrointestinal: abdom vac dressing in place; anasarca	  Skin: No cyanosis	  Neurologic: Non-focal  Extremities: 2+ LE edema diffusely   Psychiatry: sedated    Labs:                        8.1    16.9  )-----------( 105      ( 2019 00:26 )             25.3     07-06    147<H>  |  112<H>  |  33<H>  ----------------------------<  177<H>  4.0   |  26  |  1.00    Ca    7.9<L>      2019 14:02  Phos  3.5     07-  Mg     2.3     07-06      PT/INR - ( 2019 00:26 )   PT: 13.8 sec;   INR: 1.20 ratio         PTT - ( 2019 00:26 )  PTT:27.5 sec

## 2019-07-06 NOTE — PROGRESS NOTE ADULT - PROBLEM SELECTOR PLAN 2
- Management as per SICU/surgical team  - Continue IV abx ceftriaxone and flagyl; cx negative  - agree with TPN given low albumin and inability to feed patient

## 2019-07-06 NOTE — PROGRESS NOTE ADULT - SUBJECTIVE AND OBJECTIVE BOX
HISTORY  74y Female PMHx of HFrEF s/p AICD, asthma, HTN, HLD, atrial fibrillation on eliquis p/w severe, sudden onset L sided abd pain which started yesterday morning after eating. She endorsed nausea at the time but no vomiting. She did have any fevers, chills, diarrhea or hematochezia.      CTAP w/ IV contrast showed proximal descending colon/splenic flexure diverticulitis with small amount of free fluid and 3cm low attenuation structure in the right adnexa ?fluid collection ?ovarian cyst. She developed dyspnea and wheezing after the CT scan and became hypoxic. Bedside US int he ED demonestrated a few B lines. She received 80mg total of IV lasix, 125 of solumedrol, duonebs x2 and narcan and she was admitted to MICU for management of respiratory and heart failure.     Overnight through this morning she was persistently tachycardic with BPs as low as 76/50, MAPs in low 60s. She was bolused with 500cc of crystalloid without improvement in blood pressure. She was then started on phenylephrine. Her lactate was uptrending from 2 to 2.6 to 3 on the arterial blood gas. A repeat CT scan showed "Acute diverticulitis at the splenic flexure with evidence of contained perforation. No drainable regional collection. Increased ascites in the deep pelvis." She was transferred to the SICU for further HD monitoring. Antibiotics were switched to Aztreonam and flagyl. Bedside ultrasound showed IV 1.5 minimally collapsable and echo consistent with euvolemic state. Antibiotics again switched to Ceftriaxone and flagyl. OGT switched to NGT.          24 HOUR EVENTS: 40mg. of IV lasix administered x2 during the day. RTOR for washout, returned to SICU intubated. TLC discontinued and PICC line inserted. TPN started. IV locked, fentanyl gtt discontinued. 20mg. of lasix given overnight.     SUBJECTIVE/ROS:  [ ] A ten-point review of systems was otherwise negative except as noted.  [ ] Due to altered mental status/intubation, subjective information were not able to be obtained from the patient. History was obtained, to the extent possible, from review of the chart and collateral sources of information.      NEURO  RASS:  -2   Meds: dexmedetomidine Infusion 0.1 MICROgram(s)/kG/Hr IV Continuous <Continuous>  HYDROmorphone  Injectable 0.5 milliGRAM(s) IV Push every 3 hours PRN pain  [x] Adequacy of sedation and pain control has been assessed and adjusted      RESPIRATORY  RR: 24 (07-06-19 @ 03:00) (20 - 25)  SpO2: 100% (07-06-19 @ 03:00) (73% - 100%)  Exam: unlabored, clear to auscultation bilaterally  Mechanical Ventilation: Mode: AC/ CMV (Assist Control/ Continuous Mandatory Ventilation), RR (machine): 24, RR (patient): 25, TV (machine): 450, FiO2: 30, PEEP: 5, ITime: 0.7, MAP: 14, PIP: 50  ABG - ( 04 Jul 2019 10:51 )  pH: 7.39  /  pCO2: 42    /  pO2: 58    / HCO3: 25    / Base Excess: .5    /  SaO2: 88      Lactate: x      [N/A] Extubation Readiness Assessed  Meds: ALBUTerol/ipratropium for Nebulization 3 milliLiter(s) Nebulizer every 6 hours  buDESOnide   0.5 milliGRAM(s) Respule 0.5 milliGRAM(s) Inhalation every 12 hours        CARDIOVASCULAR  HR: 104 (07-06-19 @ 03:00) (94 - 108)  BP: 114/58 (07-06-19 @ 03:00) (91/52 - 148/65)  BP(mean): 81 (07-06-19 @ 03:00) (65 - 103)  VBG - ( 06 Jul 2019 00:28 )  pH: 7.41  /  pCO2: 43    /  pO2: 44    / HCO3: 27    / Base Excess: 2.3   /  SaO2: 78     Lactate: 1.6    Exam: regular rate and rhythm  Cardiac Rhythm: sinus  Perfusion     [x]Adequate   [ ]Inadequate  Mentation   [x]Normal       [ ]Reduced  Extremities  [x]Warm         [ ]Cool  Volume Status [ ]Hypervolemic [x]Euvolemic [ ]Hypovolemic  Meds: none      GI/NUTRITION  Exam: soft, nontender, nondistended  Diet: NPO  Meds: pantoprazole  Injectable 40 milliGRAM(s) IV Push every 24 hours      GENITOURINARY  I&O's Detail    07-04 @ 07:01  -  07-05 @ 07:00  --------------------------------------------------------  IN:    dextrose 5% + sodium chloride 0.45%.: 100 mL    dextrose 5%.: 1150 mL    fentaNYL Infusion.: 79.8 mL    IV PiggyBack: 1000 mL    Solution: 249.9 mL  Total IN: 2579.7 mL    OUT:    Nasoenteral Tube: 200 mL    Ureteral Catheter: 655 mL    VAC (Vacuum Assisted Closure) System: 300 mL  Total OUT: 1155 mL    Total NET: 1424.7 mL      07-05 @ 07:01  -  07-06 @ 04:26  --------------------------------------------------------  IN:    dexmedetomidine Infusion: 10.7 mL    dextrose 5%.: 500 mL    IV PiggyBack: 250 mL    TPN (Total Parenteral Nutrition): 581 mL  Total IN: 1341.7 mL    OUT:    Ureteral Catheter: 2560 mL    VAC (Vacuum Assisted Closure) System: 150 mL  Total OUT: 2710 mL    Total NET: -1368.3 mL        Weight (kg): 83.2 (07-05 @ 09:44)  07-06    148<H>  |  114<H>  |  34<H>  ----------------------------<  170<H>  4.1   |  24  |  1.11    Ca    7.2<L>      06 Jul 2019 00:26  Phos  3.2     07-06  Mg     2.2     07-06      [x] Ching catheter, indication: urine output monitoring in critically ill   Meds: ascorbic acid IVPB 1500 milliGRAM(s) IV Intermittent every 6 hours  calcium gluconate IVPB 2 Gram(s) IV Intermittent once  Parenteral Nutrition - Adult 1 Each TPN Continuous <Continuous>  sodium chloride 0.9% lock flush 10 milliLiter(s) IV Push every 1 hour PRN Pre/post blood products, medications, blood draw, and to maintain line patency        HEMATOLOGIC  Meds: enoxaparin Injectable 40 milliGRAM(s) SubCutaneous daily    [x] VTE Prophylaxis                        8.1    16.9  )-----------( 105      ( 06 Jul 2019 00:26 )             25.3     PT/INR - ( 06 Jul 2019 00:26 )   PT: 13.8 sec;   INR: 1.20 ratio         PTT - ( 06 Jul 2019 00:26 )  PTT:27.5 sec  Transfusion     [ ] PRBC   [ ] Platelets   [ ] FFP   [ ] Cryoprecipitate      INFECTIOUS DISEASES  WBC Count: 16.9 K/uL (07-06 @ 00:26)  WBC Count: 15.6 K/uL (07-05 @ 11:16)    RECENT CULTURES:  Specimen Source: .Surgical Swab None Specimen Source:  abdomen, esw  Date/Time: 07-02 @ 10:15  Culture Results:   Moderate Escherichia coli  Moderate Alpha hemolytic strep "Susceptibilities not performed"  Growth in fluid media only Gram Positive Cocci in Pairs and Chains  Gram Stain: --  Organism: Escherichia coli  Specimen Source: .Blood  Date/Time: 07-01 @ 10:04  Culture Results:   No growth to date.  Gram Stain: --  Organism: --  Specimen Source: .Urine  Date/Time: 07-01 @ 10:03  Culture Results:   No growth  Gram Stain: --  Organism: --    Meds: cefTRIAXone   IVPB 1000 milliGRAM(s) IV Intermittent every 24 hours  metroNIDAZOLE  IVPB 500 milliGRAM(s) IV Intermittent every 8 hours        ENDOCRINE  CAPILLARY BLOOD GLUCOSE      POCT Blood Glucose.: 181 mg/dL (06 Jul 2019 01:53)  POCT Blood Glucose.: 183 mg/dL (06 Jul 2019 00:16)  POCT Blood Glucose.: 160 mg/dL (05 Jul 2019 22:22)  POCT Blood Glucose.: 160 mg/dL (05 Jul 2019 21:11)  POCT Blood Glucose.: 146 mg/dL (05 Jul 2019 18:00)  POCT Blood Glucose.: 130 mg/dL (05 Jul 2019 16:27)  POCT Blood Glucose.: 168 mg/dL (05 Jul 2019 06:21)    Meds: insulin lispro (HumaLOG) corrective regimen sliding scale   SubCutaneous every 4 hours        ACCESS DEVICES:  [x] Peripheral IV  [ ] Central Venous Line	[ ] R	[ ] L	[ ] IJ	[ ] Fem	[ ] SC	Placed:   [ ] Arterial Line		[ ] R	[ ] L	[ ] Fem	[ ] Rad	[ ] Ax	Placed:   [ ] PICC:					[ ] Mediport  [ ] Urinary Catheter, Date Placed:   [x] Necessity of urinary, arterial, and venous catheters discussed    OTHER MEDICATIONS:  chlorhexidine 0.12% Liquid 15 milliLiter(s) Oral Mucosa <User Schedule>  chlorhexidine 2% Cloths 1 Application(s) Topical daily  chlorhexidine 4% Liquid 1 Application(s) Topical <User Schedule>      CODE STATUS: full code        IMAGING: < from: CT Abdomen and Pelvis No Cont (07.01.19 @ 14:57) >  FINDINGS:    LOWER CHEST: Mild bronchiectasis with subsegmental atelectasis in the   right middle lobe. Partially visualized iliac CT wires.    LIVER: Within normal limits.  BILE DUCTS: Normal caliber.  GALLBLADDER: Within normal limits.  SPLEEN: Within normal limits.  PANCREAS: Within normal limits.  ADRENALS: Within normal limits.  KIDNEYS/URETERS:Retention of contrast within the kidneys from prior CT 1   day earlier suggestive of acute tubular necrosis.    BLADDER: Collapsed or on a Ching catheter.  REPRODUCTIVE ORGANS: Small calcified fibroids.    BOWEL/PERITONEUM: No bowel obstruction. Diffuse colonic diverticulosis   with inflammation at the level of the splenic flexure. A few extraluminal   droplets of air adjacent to the inflamed colon as well as small fluid   without well-defined collection. Small ascites in the deep pelvis with   interval increase from prior imaging one day earlier.  VESSELS:  Atherosclerotic changes.  RETROPERITONEUM: No lymphadenopathy.    ABDOMINAL WALL: Within normal limits.  BONES: Degenerative changes.    IMPRESSION:     Acute diverticulitis at the splenic flexure with evidence of contained   perforation.     No drainable regional collection.    Increased ascites in the deep pelvis.    Retention of contrast within the kidneys from CT 1 day biliary suggestive   of acute tubular necrosis.        < end of copied text >

## 2019-07-06 NOTE — PROGRESS NOTE ADULT - SUBJECTIVE AND OBJECTIVE BOX
Interval Events: RTOR for washout, returned to SICU intubated.    S: Intubated and sedated    O: Vital Signs  T(C): 36.9 (07-06 @ 03:00), Max: 37.1 (07-05 @ 10:49)  HR: 100 (07-06 @ 05:27) (94 - 118)  BP: 111/57 (07-06 @ 05:00) (91/52 - 148/65)  RR: 24 (07-06 @ 05:00) (20 - 25)  SpO2: 100% (07-06 @ 05:27) (73% - 100%)  07-04-19 @ 07:01  -  07-05-19 @ 07:00  --------------------------------------------------------  IN: 2579.7 mL / OUT: 1155 mL / NET: 1424.7 mL    07-05-19 @ 07:01  -  07-06-19 @ 05:55  --------------------------------------------------------  IN: 1524.7 mL / OUT: 2710 mL / NET: -1185.3 mL      General: alert and oriented, NAD  Resp: ETT secured in place, respirations unlabored  CVS: regular rate and rhythm  Abdomen: soft, nontender, nondistended, abthera vac in place with good suction  Extremities: no edema  Skin: warm, dry, appropriate color                          8.1    16.9  )-----------( 105      ( 06 Jul 2019 00:26 )             25.3   07-06    148<H>  |  114<H>  |  34<H>  ----------------------------<  170<H>  4.1   |  24  |  1.11    Ca    7.2<L>      06 Jul 2019 00:26  Phos  3.2     07-06  Mg     2.2     07-06

## 2019-07-06 NOTE — PROGRESS NOTE ADULT - ASSESSMENT
73 yo F with PMHx of HFrEF (EF 35% now improved to 45%) s/p CRT-D, asthma (not on BB d/t severity), HTN, HLD, atrial fibrillation on eliquis, who p/w severe, sudden onset L sided abd pain, found to have proximal descending colon/splenic flexure acute diverticulitis with evidence of contained perforation, now s/p exploratory laparotomy, left hemicolectomy, end colostomy, and ABThera VAC placement on 7/1. Transferred back to the SICU immediately postop requiring high doses of pressors, now off and being treated with broad spectrum abx. S/P abdominal washout x2 (last 7/5). Current HD now off pressors but markedly edematous with slightly elevated filling pressures but predominantly from low albumin.

## 2019-07-06 NOTE — PROGRESS NOTE ADULT - ASSESSMENT
74F admitted with abdominal pain and exam findings consistent with peritonitis secondary to acute diverticulitis at the splenic flexure with evidence of contained perforation now POD3 s/p Exploratory laparotomy, left hemicolectomy, end colostomy, and ABThera Vac placement and POD2 from RTOR for abdominal washout and abthera vac placement, off pressors.     PLAN:  NEURO: acute pain control  - Pain control with dilaudid PRN     RESPIRATORY: intubated, PMHx of Asthma  - F/u AM chest Xray  - F/u serial ABGS  - Wean mechanical ventilation    CARDIOVASCULAR:  off pressors  - Cautious fluid resuscitation, appreciate heart failure recommendations  - Hold home anti-hypertensives  - Trend lactate    GI/NUTRITION: s/p ex lap and L hemicolectomy with end colostomy and ABThera VAC placement and washout  - NPO, IVF, NGT   - continue TPN   - monitor for ostomy function    GENITOURINARY/RENAL: DIANNA (improving)  - Renally dose antibiotics  - Trend K+    HEMATOLOGIC: H/H stable  - Patient does NOT accept blood products  - Trend CBCs  - Hold ASA and A/C    INFECTIOUS DISEASE: Colonic perforation, cultures + for E.coli  - Ceftriaxone and Flagyl     ENDOCRINE:  - ISS, HgA1C  - Off solucortef    Dispo: SICU full code 74F admitted with abdominal pain and exam findings consistent with peritonitis secondary to acute diverticulitis at the splenic flexure with evidence of contained perforation now POD3 s/p Exploratory laparotomy, left hemicolectomy, end colostomy, and ABThera Vac placement and POD2 from RTOR for abdominal washout and abthera vac placement, off pressors.     PLAN:  NEURO: acute pain control  - Pain control with dilaudid PRN     RESPIRATORY: intubated, PMHx of Asthma  - F/u AM chest Xray  - F/u serial ABGS  - Wean mechanical ventilation    CARDIOVASCULAR:  off pressors  - Cautious fluid resuscitation, appreciate heart failure recommendations  - Hold home anti-hypertensives  - Trend lactate    GI/NUTRITION: s/p ex lap and L hemicolectomy with end colostomy and ABThera VAC placement and washout  - NPO, IVF, NGT   - continue TPN   - monitor for ostomy function    GENITOURINARY/RENAL: DIANNA on CKD stage 3  - Renally dose antibiotics  - Trend K+    HEMATOLOGIC: H/H stable  - Patient does NOT accept blood products  - Trend CBCs  - Hold ASA and A/C    INFECTIOUS DISEASE: Colonic perforation, cultures + for E.coli  - Ceftriaxone and Flagyl     ENDOCRINE:  - ISS, HgA1C  - Off solucortef    Dispo: SICU full code

## 2019-07-06 NOTE — PROGRESS NOTE ADULT - ATTENDING COMMENTS
Pt seen and examined.  Chart reviewed.  Resident note confirmed.  Pt is a 74 year old female with a medical history signficant for CAD/HTN/AF who presented to [facility] with perforated diverticulitis. Pt is s/p left hemicolectomy with ileostomy. Temporary abdominal closure was performed with abthera vac placement. Diuresis is in progress to facilitate abdominal closure.  No acute events overnight.       PMH/PSH/MEDS/ALL/SH/FH/ROS:  Unchanged from H&P above  Vitals/PE/Labs/Radiographic data:  Reviewed         A/P  Neuro:  Post op pain  	Continue pain control		    CVS:	S/p sepsis  	CAD/HTN/AF  	S/p resuscitation  	Continue diuresis with lasix  	Lactate has cleared  	Monitor vitals    Pulm:  	resp failure  	Atelectasis   	Continue vent support    	  GI:	Perforated diverticulitis  	S/p colectomy/colostomy  	Malnutrition  	Continue tube feeds    :  	Resolving DIANNA  	Monitor and replace lytes  	Monitor I’s and O’s    Heme:   Anemia  	Monitor H/h  	Pt does not accept blood products    ID: 	Perforated diverticulitis, s/p rsn  	S/p sepsis  	Leukocytosis  	Continue ABX  	Culture for fever    Endo:	Hyperglycemia  	Continue glycemic control

## 2019-07-06 NOTE — PROGRESS NOTE ADULT - ASSESSMENT
A/P:74F admitted with abdominal pain and exam findings consistent with peritonitis secondary to acute diverticulitis at the splenic flexure with evidence of contained perforation now POD3 s/p Exploratory laparotomy, left hemicolectomy, end colostomy, and ABThera Vac placement and POD2 from RTOR for abdominal washout and abthera vac placement, off pressors.       [X  ] Initiate TPN formula: at 1/2 goal today in 2L fluid with a goal of approx 1500Kcal w/  Carbohydrates: 210_grams, Amino Acid: 120grams  will asses Lipid needs once Triglycerides level obtained  Protein-Calorie Malnutrition    1. Dedicated Central line must be placed for TPN, no TPN will be ordered until line is placed Pt on Ceftriaxone - can not run this in PICC  2. Strict Intake and Output.  3. Weights three times a week  4. Monitor BMP, Mg+, Ionized Ca++, Phosphorus daily  5.  Check Triglycerides daily for first 3 days of TPN, then monthly   6. Pre-albumin weekly.  7. Hyperglycemia-  Initiate TPN w/ 5U insulin and will adjust accordingly as Dextrose increased and based on FS.        Fingersticks to monitor glucose every 6 hours until stable then may be decreased to twice a day, coverage with ISS - to be ordered by primary team  8. HyperNa- No NaAce or NaCl in TPN, only NaPhos 15mMol given and pt started w/ 2L fluid  9. Continue as per SICU/ Surgery, will follow with you  D/w team & RD    TPN team, pager [phone]  D/w Adela Cross A/P:74F admitted with abdominal pain and exam findings consistent with peritonitis secondary to acute diverticulitis at the splenic flexure with evidence of contained perforation now POD3 s/p Exploratory laparotomy, left hemicolectomy, end colostomy, and ABThera Vac placement and POD2 from RTOR for abdominal washout and abthera vac placement, off pressors. TPN started 7/5 for Protein-Calorie Malnutrition        - TPN formula to goal today: Carbohydrates: 210_grams, Amino Acid: 120grams, 40g lipids in 2400 ml  -  Pt on Ceftriaxone - can not run this in PICC  - Strict Intake and Output.  - Weights three times a week  - Monitor BMP, Mg+, Ionized Ca++, Phosphorus daily  - Check Triglycerides daily for first 3 days of TPN, then monthly   - Pre-albumin weekly.  - Hyperglycemia-  Increased to 20U insulin and will adjust accordingly as Dextrose increased today and based on FS.        Fingersticks to monitor glucose every 6 hours until stable then may be decreased to twice a day, coverage with ISS - to be ordered by primary team  - HyperNa- No NaAce or NaCl in TPN, only NaPhos 30mmol , increased TV to 2400ml  - hyphposphatemia- increased NaPhos to 30mmol  - Continue as per SICU/ Surgery, will follow with you      TPN team, pager [phone]  D/w Adela Cross

## 2019-07-06 NOTE — PROGRESS NOTE ADULT - PROBLEM SELECTOR PLAN 3
- Management as above  - Home medications of enalapril and spironolactone currently on hold in setting of vasodilatory shock  - brief runs of NSVT; can give metoprolol 2.5 mg IV q8h however would be discontinue if develops bronchospasm

## 2019-07-06 NOTE — PROGRESS NOTE ADULT - SUBJECTIVE AND OBJECTIVE BOX
[facility] NUTRITION SUPPORT--  Attending/ PA FOLLOW UP NOTE      24 hour events/subjective: Denies Palpitations, chest pain, shortness of breath. Denies nausea nor vomiting nor abdominal pain.        PAST HISTORY  --------------------------------------------------------------------------------  No significant changes to PMH, PSH, FHx, SHx, unless otherwise noted    ALLERGIES & MEDICATIONS  --------------------------------------------------------------------------------  Allergies    Coreg (Other)  digoxin (Other; Short breath (Mild to Mod))  Lopressor (Short breath)  penicillins (Hives)    Intolerances    metoprolol (Other)  Solu-Medrol (Other (Mild to Mod))    Standing Inpatient Medications  ALBUTerol/ipratropium for Nebulization 3 milliLiter(s) Nebulizer every 6 hours  ascorbic acid IVPB 1500 milliGRAM(s) IV Intermittent every 6 hours  buDESOnide   0.5 milliGRAM(s) Respule 0.5 milliGRAM(s) Inhalation every 12 hours  cefTRIAXone   IVPB 1000 milliGRAM(s) IV Intermittent every 24 hours  chlorhexidine 0.12% Liquid 15 milliLiter(s) Oral Mucosa <User Schedule>  chlorhexidine 2% Cloths 1 Application(s) Topical daily  dexmedetomidine Infusion 0.1 MICROgram(s)/kG/Hr IV Continuous <Continuous>  enoxaparin Injectable 40 milliGRAM(s) SubCutaneous daily  insulin lispro (HumaLOG) corrective regimen sliding scale   SubCutaneous every 4 hours  metroNIDAZOLE  IVPB 500 milliGRAM(s) IV Intermittent every 8 hours  pantoprazole  Injectable 40 milliGRAM(s) IV Push every 24 hours  Parenteral Nutrition - Adult 1 Each TPN Continuous <Continuous>    PRN Inpatient Medications  HYDROmorphone  Injectable 0.5 milliGRAM(s) IV Push every 3 hours PRN  sodium chloride 0.9% lock flush 10 milliLiter(s) IV Push every 1 hour PRN      REVIEW OF SYSTEMS  --------------------------------------------------------------------------------  Gen: as per HPI  Skin: No rashes  Head/Eyes/Ears/Mouth: No headache;No sore throat  Respiratory: No dyspnea, cough,   CV: No chest pain, PND, orthopnea  GI: as per HPI  : No increased frequency, dysuria, hematuria, nocturia  MSK: No joint pain/swelling; no back pain; no edema  Neuro: No dizziness/lightheadedness, weakness, seizures, numbness, tingling  Psych: No significant nervousness, anxiety, stress, depression    All other systems were reviewed and are negative, except as noted.      LABS/STUDIES  --------------------------------------------------------------------------------              8.1    16.9  >-----------<  105      [07-06-19 @ 00:26]              25.3     148  |  114  |  34  ----------------------------<  170      [07-06-19 @ 00:26]  4.1   |  24  |  1.11        Ca     7.2     [07-06-19 @ 00:26]      iCa    1.10     [07-06 @ 01:24]      Mg     2.2     [07-06-19 @ 00:26]      Phos  3.2     [07-06-19 @ 00:26]      PT/INR: PT 13.8 , INR 1.20       [07-06-19 @ 00:26]  PTT: 27.5       [07-06-19 @ 00:26]      Blood Gas Arterial - Calcium, Ionized: 1.13 mmoL/L (07-04-19 @ 10:51)  Blood Gas Calcium, Ionized - Venous: 1.13 mmoL/L (07-06-19 @ 00:28)  Blood Gas Calcium, Ionized - Venous: 1.14 mmoL/L (07-05-19 @ 11:16)  Blood Gas Calcium, Ionized - Venous: 1.13 mmoL/L (07-05-19 @ 00:24)  Blood Gas Calcium, Ionized - Venous: 1.08 mmoL/L (07-04-19 @ 15:41)  Blood Gas Calcium, Ionized - Venous: 1.13 mmoL/L (07-04-19 @ 09:38)    Glucose, Serum: 170 mg/dL (07-06-19 @ 00:26)  Glucose, Serum: 129 mg/dL (07-05-19 @ 11:16)    PrealbuminPrealbumin, Serum: 5 mg/dL (07-04-19 @ 22:45)    Triglycerides      07-05-19 @ 07:01  -  07-06-19 @ 07:00  --------------------------------------------------------  IN: 1890.7 mL / OUT: 3110 mL / NET: -1219.3 mL        VITALS/PHYSICAL EXAM  --------------------------------------------------------------------------------  T(C): 36.9 (07-06-19 @ 03:00), Max: 37.1 (07-05-19 @ 10:49)  HR: 103 (07-06-19 @ 07:00) (94 - 118)  BP: 98/57 (07-06-19 @ 07:00) (91/52 - 145/74)  RR: 24 (07-06-19 @ 07:00) (20 - 25)  SpO2: 100% (07-06-19 @ 07:00) (73% - 100%)  Wt(kg): --  Height (cm): 160.02 (07-05-19 @ 09:44)  Weight (kg): 83.2 (07-05-19 @ 09:44)  BMI (kg/m2): 32.5 (07-05-19 @ 09:44)  BSA (m2): 1.86 (07-05-19 @ 09:44)    Physical Exam:  	Gen: Guarded, but stable, WN/ WD/ WG, sedated, intubated  	HEENT: NC/AT, PERRL, mucosa moist, supple neck, clear oropharynx  	Pulm: CTA B/L  	CV: RRR, S1S2; no rub  	Back: No spinal or CVA tenderness; no sacral edema  	GI: +softly distended, nontender, no BS                  (+)Ostomy pink & viable, no air nor stool                  ABthera intact w/ good seal              :  (+)Ching              MSK/Vascular: Passive ROM x4,  no clubbing, cyanosis, nor edema  	Neuro: unable to assess, attempts to open eyes when call her name  	Skin: Warm, good turgor, without rashes [facility] NUTRITION SUPPORT--  Attending/ PA FOLLOW UP NOTE      24 hour events/subjective: Pt intubated. Pt tolerated her first day pf TPN yesterday without issues. As per RN no noted palpitations, chest pain, shortness of breath.. nor nausea nor vomiting nor abdominal pain.        PAST HISTORY  --------------------------------------------------------------------------------  No significant changes to PMH, PSH, FHx, SHx, unless otherwise noted    ALLERGIES & MEDICATIONS  --------------------------------------------------------------------------------  Allergies    Coreg (Other)  digoxin (Other; Short breath (Mild to Mod))  Lopressor (Short breath)  penicillins (Hives)    Intolerances    metoprolol (Other)  Solu-Medrol (Other (Mild to Mod))    Standing Inpatient Medications  ALBUTerol/ipratropium for Nebulization 3 milliLiter(s) Nebulizer every 6 hours  ascorbic acid IVPB 1500 milliGRAM(s) IV Intermittent every 6 hours  buDESOnide   0.5 milliGRAM(s) Respule 0.5 milliGRAM(s) Inhalation every 12 hours  cefTRIAXone   IVPB 1000 milliGRAM(s) IV Intermittent every 24 hours  chlorhexidine 0.12% Liquid 15 milliLiter(s) Oral Mucosa <User Schedule>  chlorhexidine 2% Cloths 1 Application(s) Topical daily  dexmedetomidine Infusion 0.1 MICROgram(s)/kG/Hr IV Continuous <Continuous>  enoxaparin Injectable 40 milliGRAM(s) SubCutaneous daily  insulin lispro (HumaLOG) corrective regimen sliding scale   SubCutaneous every 4 hours  metroNIDAZOLE  IVPB 500 milliGRAM(s) IV Intermittent every 8 hours  pantoprazole  Injectable 40 milliGRAM(s) IV Push every 24 hours  Parenteral Nutrition - Adult 1 Each TPN Continuous <Continuous>    PRN Inpatient Medications  HYDROmorphone  Injectable 0.5 milliGRAM(s) IV Push every 3 hours PRN  sodium chloride 0.9% lock flush 10 milliLiter(s) IV Push every 1 hour PRN      REVIEW OF SYSTEMS  --------------------------------------------------------------------------------  Gen: as per HPI  Skin: No rashes  Head/Eyes/Ears/Mouth: No headache;No sore throat  Respiratory: No dyspnea, cough,   CV: No chest pain, PND, orthopnea  GI: as per HPI  : No increased frequency, dysuria, hematuria, nocturia  MSK: No joint pain/swelling; no back pain; no edema  Neuro: No dizziness/lightheadedness, weakness, seizures, numbness, tingling  Psych: No significant nervousness, anxiety, stress, depression    All other systems were reviewed and are negative, except as noted.      LABS/STUDIES  --------------------------------------------------------------------------------              8.1    16.9  >-----------<  105      [07-06-19 @ 00:26]              25.3     148  |  114  |  34  ----------------------------<  170      [07-06-19 @ 00:26]  4.1   |  24  |  1.11        Ca     7.2     [07-06-19 @ 00:26]      iCa    1.10     [07-06 @ 01:24]      Mg     2.2     [07-06-19 @ 00:26]      Phos  3.2     [07-06-19 @ 00:26]      PT/INR: PT 13.8 , INR 1.20       [07-06-19 @ 00:26]  PTT: 27.5       [07-06-19 @ 00:26]      Blood Gas Arterial - Calcium, Ionized: 1.13 mmoL/L (07-04-19 @ 10:51)  Blood Gas Calcium, Ionized - Venous: 1.13 mmoL/L (07-06-19 @ 00:28)  Blood Gas Calcium, Ionized - Venous: 1.14 mmoL/L (07-05-19 @ 11:16)  Blood Gas Calcium, Ionized - Venous: 1.13 mmoL/L (07-05-19 @ 00:24)  Blood Gas Calcium, Ionized - Venous: 1.08 mmoL/L (07-04-19 @ 15:41)  Blood Gas Calcium, Ionized - Venous: 1.13 mmoL/L (07-04-19 @ 09:38)    Glucose, Serum: 170 mg/dL (07-06-19 @ 00:26)  Glucose, Serum: 129 mg/dL (07-05-19 @ 11:16)    PrealbuminPrealbumin, Serum: 5 mg/dL (07-04-19 @ 22:45)    Triglycerides      07-05-19 @ 07:01  -  07-06-19 @ 07:00  --------------------------------------------------------  IN: 1890.7 mL / OUT: 3110 mL / NET: -1219.3 mL        VITALS/PHYSICAL EXAM  --------------------------------------------------------------------------------  T(C): 36.9 (07-06-19 @ 03:00), Max: 37.1 (07-05-19 @ 10:49)  HR: 103 (07-06-19 @ 07:00) (94 - 118)  BP: 98/57 (07-06-19 @ 07:00) (91/52 - 145/74)  RR: 24 (07-06-19 @ 07:00) (20 - 25)  SpO2: 100% (07-06-19 @ 07:00) (73% - 100%)  Wt(kg): --  Height (cm): 160.02 (07-05-19 @ 09:44)  Weight (kg): 83.2 (07-05-19 @ 09:44)  BMI (kg/m2): 32.5 (07-05-19 @ 09:44)  BSA (m2): 1.86 (07-05-19 @ 09:44)    Physical Exam:  	Gen: Guarded, but stable, WN/ WD/ WG, sedated, intubated  	HEENT: NC/AT, PERRL, mucosa moist, supple neck, clear oropharynx  	Pulm: CTA B/L  	CV: RRR, S1S2; no rub  	Back: No spinal or CVA tenderness; no sacral edema  	GI: +softly distended, nontender, no BS                  (+)Ostomy pink & viable, no air nor stool                  ABthera intact w/ good seal              :  (+)Ching              MSK/Vascular: Passive ROM x4,  no clubbing, cyanosis, nor edema  	Neuro: unable to assess, attempts to open eyes when call her name  	Skin: Warm, good turgor, without rashes

## 2019-07-06 NOTE — PROGRESS NOTE ADULT - ATTENDING COMMENTS
Patient seen/examined.  Agree w above note and plan and have discussed plan w house staff.  OR Monday. PIC line in, getting TPN    Rafi Vázquez MD

## 2019-07-06 NOTE — PROGRESS NOTE ADULT - ASSESSMENT
75 y/o F with PMHx of HFrEF s/p AICD, asthma, HTN, afib on eliquis p/w abd pain s/p ex-lap, left hemicolectomy and end colostomy with abthera placement for perforated diverticulitis on 7/2, abdominal washout x2 (7/3, 7/5).  - Appreciate care by SICU team  - Return to OR for abdominal washout and possible closure    Green [id number]

## 2019-07-06 NOTE — PROGRESS NOTE ADULT - ATTENDING COMMENTS
seen and examined 07-06-19 @ 1750    intubated on mechanical vent (FiO2 = 30%, PEEP = +5)  soft / NT / ND  colostomy bag empty  ABThera in place  bilateral LE edema to ankles      sepsis from perforated diverticulitis with intraabdominal hypertension  7/2 - s/p Lupe's / ABThera  7/3 - s/p washout / ABThera  7/5 - washout / ABThera    moderate protein calorie malnutrition  -advance TPN to goal with lipids    type 2 DM  -increase insulin 5 -> 20 units    hypernatremia  -continue NaCl 0 mEq and NaAcetate 0 mEq  -increase total volume 2000 -> 2400 mL    hypophosphatemia  -increase NaPhos 15 -> 30 mmol

## 2019-07-07 LAB
ANION GAP SERPL CALC-SCNC: 10 MMOL/L — SIGNIFICANT CHANGE UP (ref 5–17)
APTT BLD: 26.5 SEC — LOW (ref 27.5–36.3)
BUN SERPL-MCNC: 33 MG/DL — HIGH (ref 7–23)
CALCIUM SERPL-MCNC: 7.6 MG/DL — LOW (ref 8.4–10.5)
CHLORIDE SERPL-SCNC: 112 MMOL/L — HIGH (ref 96–108)
CHOLEST SERPL-MCNC: 37 MG/DL — SIGNIFICANT CHANGE UP (ref 10–199)
CO2 SERPL-SCNC: 25 MMOL/L — SIGNIFICANT CHANGE UP (ref 22–31)
CREAT SERPL-MCNC: 0.93 MG/DL — SIGNIFICANT CHANGE UP (ref 0.5–1.3)
CULTURE RESULTS: SIGNIFICANT CHANGE UP
GAS PNL BLDV: SIGNIFICANT CHANGE UP
GLUCOSE BLDC GLUCOMTR-MCNC: 146 MG/DL — HIGH (ref 70–99)
GLUCOSE BLDC GLUCOMTR-MCNC: 163 MG/DL — HIGH (ref 70–99)
GLUCOSE BLDC GLUCOMTR-MCNC: 189 MG/DL — HIGH (ref 70–99)
GLUCOSE BLDC GLUCOMTR-MCNC: 193 MG/DL — HIGH (ref 70–99)
GLUCOSE BLDC GLUCOMTR-MCNC: 206 MG/DL — HIGH (ref 70–99)
GLUCOSE BLDC GLUCOMTR-MCNC: 212 MG/DL — HIGH (ref 70–99)
GLUCOSE BLDC GLUCOMTR-MCNC: 225 MG/DL — HIGH (ref 70–99)
GLUCOSE SERPL-MCNC: 214 MG/DL — HIGH (ref 70–99)
HCT VFR BLD CALC: 22.9 % — LOW (ref 34.5–45)
HDLC SERPL-MCNC: 17 MG/DL — LOW
HGB BLD-MCNC: 7.8 G/DL — LOW (ref 11.5–15.5)
INR BLD: 1.32 RATIO — HIGH (ref 0.88–1.16)
LIPID PNL WITH DIRECT LDL SERPL: SIGNIFICANT CHANGE UP MG/DL
MAGNESIUM SERPL-MCNC: 2 MG/DL — SIGNIFICANT CHANGE UP (ref 1.6–2.6)
MCHC RBC-ENTMCNC: 32.7 PG — SIGNIFICANT CHANGE UP (ref 27–34)
MCHC RBC-ENTMCNC: 33.9 GM/DL — SIGNIFICANT CHANGE UP (ref 32–36)
MCV RBC AUTO: 96.4 FL — SIGNIFICANT CHANGE UP (ref 80–100)
ORGANISM # SPEC MICROSCOPIC CNT: SIGNIFICANT CHANGE UP
PHOSPHATE SERPL-MCNC: 3.9 MG/DL — SIGNIFICANT CHANGE UP (ref 2.5–4.5)
PLATELET # BLD AUTO: 121 K/UL — LOW (ref 150–400)
POTASSIUM SERPL-MCNC: 3.8 MMOL/L — SIGNIFICANT CHANGE UP (ref 3.5–5.3)
POTASSIUM SERPL-SCNC: 3.8 MMOL/L — SIGNIFICANT CHANGE UP (ref 3.5–5.3)
PROTHROM AB SERPL-ACNC: 15.3 SEC — HIGH (ref 10–12.9)
RBC # BLD: 2.37 M/UL — LOW (ref 3.8–5.2)
RBC # FLD: 14.7 % — HIGH (ref 10.3–14.5)
SODIUM SERPL-SCNC: 147 MMOL/L — HIGH (ref 135–145)
SPECIMEN SOURCE: SIGNIFICANT CHANGE UP
TOTAL CHOLESTEROL/HDL RATIO MEASUREMENT: 2.2 RATIO — LOW (ref 3.3–7.1)
TRIGL SERPL-MCNC: 102 MG/DL — SIGNIFICANT CHANGE UP (ref 10–149)
WBC # BLD: 21.7 K/UL — HIGH (ref 3.8–10.5)
WBC # FLD AUTO: 21.7 K/UL — HIGH (ref 3.8–10.5)

## 2019-07-07 PROCEDURE — 99232 SBSQ HOSP IP/OBS MODERATE 35: CPT

## 2019-07-07 PROCEDURE — 71045 X-RAY EXAM CHEST 1 VIEW: CPT | Mod: 26

## 2019-07-07 PROCEDURE — 99291 CRITICAL CARE FIRST HOUR: CPT

## 2019-07-07 RX ORDER — I.V. FAT EMULSION 20 G/100ML
16.7 EMULSION INTRAVENOUS
Qty: 40 | Refills: 0 | Status: DISCONTINUED | OUTPATIENT
Start: 2019-07-07 | End: 2019-07-08

## 2019-07-07 RX ORDER — POTASSIUM CHLORIDE 20 MEQ
20 PACKET (EA) ORAL ONCE
Refills: 0 | Status: COMPLETED | OUTPATIENT
Start: 2019-07-07 | End: 2019-07-07

## 2019-07-07 RX ORDER — TIOTROPIUM BROMIDE 18 UG/1
1 CAPSULE ORAL; RESPIRATORY (INHALATION) DAILY
Refills: 0 | Status: DISCONTINUED | OUTPATIENT
Start: 2019-07-07 | End: 2019-07-08

## 2019-07-07 RX ORDER — I.V. FAT EMULSION 20 G/100ML
16.7 EMULSION INTRAVENOUS
Qty: 40 | Refills: 0 | Status: DISCONTINUED | OUTPATIENT
Start: 2019-07-07 | End: 2019-07-07

## 2019-07-07 RX ORDER — INSULIN LISPRO 100/ML
VIAL (ML) SUBCUTANEOUS EVERY 4 HOURS
Refills: 0 | Status: DISCONTINUED | OUTPATIENT
Start: 2019-07-07 | End: 2019-07-08

## 2019-07-07 RX ORDER — ELECTROLYTE SOLUTION,INJ
1 VIAL (ML) INTRAVENOUS
Refills: 0 | Status: DISCONTINUED | OUTPATIENT
Start: 2019-07-07 | End: 2019-07-08

## 2019-07-07 RX ORDER — ELECTROLYTE SOLUTION,INJ
1 VIAL (ML) INTRAVENOUS
Refills: 0 | Status: DISCONTINUED | OUTPATIENT
Start: 2019-07-07 | End: 2019-07-07

## 2019-07-07 RX ADMIN — HYDROMORPHONE HYDROCHLORIDE 0.5 MILLIGRAM(S): 2 INJECTION INTRAMUSCULAR; INTRAVENOUS; SUBCUTANEOUS at 15:49

## 2019-07-07 RX ADMIN — Medication 100 MILLIGRAM(S): at 15:49

## 2019-07-07 RX ADMIN — HYDROMORPHONE HYDROCHLORIDE 0.5 MILLIGRAM(S): 2 INJECTION INTRAMUSCULAR; INTRAVENOUS; SUBCUTANEOUS at 08:50

## 2019-07-07 RX ADMIN — CHLORHEXIDINE GLUCONATE 15 MILLILITER(S): 213 SOLUTION TOPICAL at 21:40

## 2019-07-07 RX ADMIN — CHLORHEXIDINE GLUCONATE 15 MILLILITER(S): 213 SOLUTION TOPICAL at 05:08

## 2019-07-07 RX ADMIN — Medication 4: at 02:23

## 2019-07-07 RX ADMIN — ENOXAPARIN SODIUM 40 MILLIGRAM(S): 100 INJECTION SUBCUTANEOUS at 13:30

## 2019-07-07 RX ADMIN — CHLORHEXIDINE GLUCONATE 1 APPLICATION(S): 213 SOLUTION TOPICAL at 13:31

## 2019-07-07 RX ADMIN — Medication 1 EACH: at 21:41

## 2019-07-07 RX ADMIN — Medication 40 MILLIGRAM(S): at 13:31

## 2019-07-07 RX ADMIN — Medication 2.5 MILLIGRAM(S): at 06:01

## 2019-07-07 RX ADMIN — HYDROMORPHONE HYDROCHLORIDE 0.5 MILLIGRAM(S): 2 INJECTION INTRAMUSCULAR; INTRAVENOUS; SUBCUTANEOUS at 18:21

## 2019-07-07 RX ADMIN — Medication 2: at 21:40

## 2019-07-07 RX ADMIN — Medication 0.5 MILLIGRAM(S): at 05:14

## 2019-07-07 RX ADMIN — HYDROMORPHONE HYDROCHLORIDE 0.5 MILLIGRAM(S): 2 INJECTION INTRAMUSCULAR; INTRAVENOUS; SUBCUTANEOUS at 22:10

## 2019-07-07 RX ADMIN — Medication 2: at 15:47

## 2019-07-07 RX ADMIN — HYDROMORPHONE HYDROCHLORIDE 0.5 MILLIGRAM(S): 2 INJECTION INTRAMUSCULAR; INTRAVENOUS; SUBCUTANEOUS at 12:50

## 2019-07-07 RX ADMIN — Medication 2.5 MILLIGRAM(S): at 18:54

## 2019-07-07 RX ADMIN — Medication 0.5 MILLIGRAM(S): at 17:30

## 2019-07-07 RX ADMIN — HYDROMORPHONE HYDROCHLORIDE 0.5 MILLIGRAM(S): 2 INJECTION INTRAMUSCULAR; INTRAVENOUS; SUBCUTANEOUS at 22:25

## 2019-07-07 RX ADMIN — HYDROMORPHONE HYDROCHLORIDE 0.5 MILLIGRAM(S): 2 INJECTION INTRAMUSCULAR; INTRAVENOUS; SUBCUTANEOUS at 02:21

## 2019-07-07 RX ADMIN — Medication 40 MILLIGRAM(S): at 21:40

## 2019-07-07 RX ADMIN — Medication 3 MILLILITER(S): at 05:14

## 2019-07-07 RX ADMIN — Medication 4: at 06:01

## 2019-07-07 RX ADMIN — Medication 4: at 10:43

## 2019-07-07 RX ADMIN — Medication 3 MILLILITER(S): at 17:30

## 2019-07-07 RX ADMIN — CHLORHEXIDINE GLUCONATE 15 MILLILITER(S): 213 SOLUTION TOPICAL at 13:31

## 2019-07-07 RX ADMIN — CEFTRIAXONE 100 MILLIGRAM(S): 500 INJECTION, POWDER, FOR SOLUTION INTRAMUSCULAR; INTRAVENOUS at 06:02

## 2019-07-07 RX ADMIN — CHLORHEXIDINE GLUCONATE 15 MILLILITER(S): 213 SOLUTION TOPICAL at 18:53

## 2019-07-07 RX ADMIN — HYDROMORPHONE HYDROCHLORIDE 0.5 MILLIGRAM(S): 2 INJECTION INTRAMUSCULAR; INTRAVENOUS; SUBCUTANEOUS at 02:36

## 2019-07-07 RX ADMIN — CHLORHEXIDINE GLUCONATE 15 MILLILITER(S): 213 SOLUTION TOPICAL at 18:54

## 2019-07-07 RX ADMIN — Medication 100 MILLIEQUIVALENT(S): at 06:13

## 2019-07-07 RX ADMIN — I.V. FAT EMULSION 16.7 ML/HR: 20 EMULSION INTRAVENOUS at 21:40

## 2019-07-07 RX ADMIN — Medication 40 MILLIGRAM(S): at 05:08

## 2019-07-07 RX ADMIN — Medication 3 MILLILITER(S): at 11:24

## 2019-07-07 RX ADMIN — Medication 100 MILLIGRAM(S): at 00:21

## 2019-07-07 RX ADMIN — Medication 100 MILLIGRAM(S): at 23:02

## 2019-07-07 RX ADMIN — Medication 3 MILLILITER(S): at 01:07

## 2019-07-07 RX ADMIN — PANTOPRAZOLE SODIUM 40 MILLIGRAM(S): 20 TABLET, DELAYED RELEASE ORAL at 05:07

## 2019-07-07 RX ADMIN — Medication 100 MILLIGRAM(S): at 08:32

## 2019-07-07 NOTE — PROGRESS NOTE ADULT - SUBJECTIVE AND OBJECTIVE BOX
HISTORY  74y Female    24 HOUR EVENTS:  -unable to extubate  -started 5 metoprolol q12 after discussion w/ heart failure team  -continuing to diurese, receiving 20 lasix q8, increased to 40 lasix q8 overnight    SUBJECTIVE/ROS:  [ ] A ten-point review of systems was otherwise negative except as noted.  [ ] Due to altered mental status/intubation, subjective information were not able to be obtained from the patient. History was obtained, to the extent possible, from review of the chart and collateral sources of information.      NEURO  Exam: intubated, responsive  Meds: HYDROmorphone  Injectable 0.5 milliGRAM(s) IV Push every 3 hours PRN pain    [x] Adequacy of sedation and pain control has been assessed and adjusted      RESPIRATORY  RR: 24 (07-07-19 @ 00:00) (22 - 24)  SpO2: 100% (07-07-19 @ 01:08) (99% - 100%)  Wt(kg): --  Exam: unlabored, clear to auscultation bilaterally  Mechanical Ventilation: Mode: AC/ CMV (Assist Control/ Continuous Mandatory Ventilation), RR (machine): 24, RR (patient): 25, TV (machine): 450, FiO2: 30, PEEP: 5, ITime: 0.9, MAP: 14, PIP: 33    [N/A] Extubation Readiness Assessed  Meds: ALBUTerol/ipratropium for Nebulization 3 milliLiter(s) Nebulizer every 6 hours  buDESOnide   0.5 milliGRAM(s) Respule 0.5 milliGRAM(s) Inhalation every 12 hours        CARDIOVASCULAR  HR: 102 (07-07-19 @ 01:08) (96 - 118)  BP: 129/60 (07-07-19 @ 00:00) (98/57 - 152/56)  BP(mean): 84 (07-07-19 @ 00:00) (69 - 88)  ABP: --  ABP(mean): --  Wt(kg): --  CVP(cm H2O): --  VBG - ( 06 Jul 2019 00:28 )  pH: 7.41  /  pCO2: 43    /  pO2: 44    / HCO3: 27    / Base Excess: 2.3   /  SaO2: 78     Lactate: 1.6                Exam: regular rate and rhythm  Cardiac Rhythm: sinus  Perfusion     [x]Adequate   [ ]Inadequate  Mentation   [x]Normal       [ ]Reduced  Extremities  [x]Warm         [ ]Cool  Volume Status [ ]Hypervolemic [x]Euvolemic [ ]Hypovolemic  Meds: furosemide   Injectable 40 milliGRAM(s) IV Push every 8 hours  metoprolol tartrate Injectable 2.5 milliGRAM(s) IV Push every 12 hours        GI/NUTRITION  Exam: soft, nontender, nondistended, incision C/D/I  Diet: NPO w/ TPN  Meds: pantoprazole  Injectable 40 milliGRAM(s) IV Push every 24 hours      GENITOURINARY  I&O's Detail    07-05 @ 07:01  -  07-06 @ 07:00  --------------------------------------------------------  IN:    dexmedetomidine Infusion: 10.7 mL    dextrose 5%.: 500 mL    IV PiggyBack: 500 mL    Solution: 50 mL    TPN (Total Parenteral Nutrition): 830 mL  Total IN: 1890.7 mL    OUT:    Nasoenteral Tube: 50 mL    Ureteral Catheter: 2710 mL    VAC (Vacuum Assisted Closure) System: 350 mL  Total OUT: 3110 mL    Total NET: -1219.3 mL      07-06 @ 07:01 - 07-07 @ 01:31  --------------------------------------------------------  IN:    fat emulsion (Fish Oil and Plant Based) 20% Infusion: 100.2 mL    IV PiggyBack: 100 mL    TPN (Total Parenteral Nutrition): 1411 mL  Total IN: 1611.2 mL    OUT:    Ureteral Catheter: 1975 mL    VAC (Vacuum Assisted Closure) System: 200 mL  Total OUT: 2175 mL    Total NET: -563.8 mL          07-06    146<H>  |  111<H>  |  33<H>  ----------------------------<  219<H>  3.8   |  25  |  0.93    Ca    7.6<L>      06 Jul 2019 22:35  Phos  3.8     07-06  Mg     2.2     07-06      [ ] Cihng catheter, indication: N/A  Meds: ascorbic acid IVPB 1500 milliGRAM(s) IV Intermittent every 6 hours  fat emulsion (Fish Oil and Plant Based) 20% Infusion 16.7 mL/Hr IV Continuous <Continuous>  Parenteral Nutrition - Adult 1 Each TPN Continuous <Continuous>        HEMATOLOGIC  Meds: enoxaparin Injectable 40 milliGRAM(s) SubCutaneous daily    [x] VTE Prophylaxis                        8.1    16.9  )-----------( 105      ( 06 Jul 2019 00:26 )             25.3     PT/INR - ( 06 Jul 2019 00:26 )   PT: 13.8 sec;   INR: 1.20 ratio         PTT - ( 06 Jul 2019 00:26 )  PTT:27.5 sec  Transfusion     [ ] PRBC   [ ] Platelets   [ ] FFP   [ ] Cryoprecipitate      INFECTIOUS DISEASES    RECENT CULTURES:  Specimen Source: .Surgical Swab None Specimen Source:  abdomen, esw  Date/Time: 07-02 @ 10:15  Culture Results:   Moderate Escherichia coli  Moderate Alpha hemolytic strep "Susceptibilities not performed"  Moderate Strep mitis oralis group "Susceptibilities not performed"  Growth in fluid media only Enterococcus faecalis  Gram Stain: --  Organism: Escherichia coli  Enterococcus faecalis    Meds: cefTRIAXone   IVPB 1000 milliGRAM(s) IV Intermittent every 24 hours  metroNIDAZOLE  IVPB 500 milliGRAM(s) IV Intermittent every 8 hours        ENDOCRINE  CAPILLARY BLOOD GLUCOSE      POCT Blood Glucose.: 214 mg/dL (06 Jul 2019 21:41)  POCT Blood Glucose.: 188 mg/dL (06 Jul 2019 17:48)  POCT Blood Glucose.: 186 mg/dL (06 Jul 2019 15:35)  POCT Blood Glucose.: 193 mg/dL (06 Jul 2019 10:16)  POCT Blood Glucose.: 190 mg/dL (06 Jul 2019 06:26)  POCT Blood Glucose.: 181 mg/dL (06 Jul 2019 01:53)    Meds: insulin lispro (HumaLOG) corrective regimen sliding scale   SubCutaneous every 4 hours        ACCESS DEVICES:  [ ] Peripheral IV  [ ] Central Venous Line	[ ] R	[ ] L	[ ] IJ	[ ] Fem	[ ] SC	Placed:   [ ] Arterial Line		[ ] R	[ ] L	[ ] Fem	[ ] Rad	[ ] Ax	Placed:   [ ] PICC:					[ ] Mediport  [ ] Urinary Catheter, Date Placed:   [x] Necessity of urinary, arterial, and venous catheters discussed    OTHER MEDICATIONS:  chlorhexidine 0.12% Liquid 15 milliLiter(s) Oral Mucosa <User Schedule>  chlorhexidine 2% Cloths 1 Application(s) Topical daily      CODE STATUS:      IMAGING: HISTORY  74y Female    24 HOUR EVENTS:  -continuing to diurese with lasix and metolazone, was 1.9 liters net negative on IO balance    SUBJECTIVE/ROS:  [x] A ten-point review of systems was otherwise negative except as noted.  [x] Due to altered mental status/intubation, subjective information were not able to be obtained from the patient. History was obtained, to the extent possible, from review of the chart and collateral sources of information.      NEURO  Exam: intubated, responsive and follows command  Meds: HYDROmorphone  Injectable 0.5 milliGRAM(s) IV Push every 3 hours PRN pain    [x] Adequacy of sedation and pain control has been assessed and adjusted      RESPIRATORY  RR: 24 (07-08-19 @ 00:00) (22 - 24)  SpO2: 100% (07-08-19 @ 01:08) (99% - 100%)  Wt(kg): --  Exam: unlabored, clear to auscultation bilaterally  Mechanical Ventilation: Mode: AC/ CMV (Assist Control/ Continuous Mandatory Ventilation), RR (machine): 24, RR (patient): 25, TV (machine): 450, FiO2: 30, PEEP: 5, ITime: 0.9, MAP: 14, PIP: 33    [N/A] Extubation Readiness Assessed  Meds: ALBUTerol/ipratropium for Nebulization 3 milliLiter(s) Nebulizer every 6 hours  buDESOnide   0.5 milliGRAM(s) Respule 0.5 milliGRAM(s) Inhalation every 12 hours        CARDIOVASCULAR  HR: 102 (07-07-19 @ 01:08) (96 - 118)  BP: 129/60 (07-07-19 @ 00:00) (98/57 - 152/56)  BP(mean): 84 (07-07-19 @ 00:00) (69 - 88)  ABP: --  ABP(mean): --  Wt(kg): --  CVP(cm H2O): --  VBG - ( 06 Jul 2019 00:28 )  pH: 7.41  /  pCO2: 43    /  pO2: 44    / HCO3: 27    / Base Excess: 2.3   /  SaO2: 78     Lactate: 1.8                Exam: regular rate and rhythm  Cardiac Rhythm: sinus  Perfusion     [x]Adequate   [ ]Inadequate  Mentation   [x]Normal       [ ]Reduced  Extremities  [x]Warm         [ ]Cool  Volume Status [ ]Hypervolemic [x]Euvolemic [ ]Hypovolemic  Meds: furosemide   Injectable 40 milliGRAM(s) IV Push every 8 hours  metoprolol tartrate Injectable 2.5 milliGRAM(s) IV Push every 12 hours        GI/NUTRITION  Exam: soft, nontender, nondistended, incision C/D/I  Diet: NPO w/ TPN  Meds: pantoprazole  Injectable 40 milliGRAM(s) IV Push every 24 hours      GENITOURINARY  I&O's Detail    07-05 @ 07:01  -  07-06 @ 07:00  --------------------------------------------------------  IN:    dexmedetomidine Infusion: 10.7 mL    dextrose 5%.: 500 mL    IV PiggyBack: 500 mL    Solution: 50 mL    TPN (Total Parenteral Nutrition): 830 mL  Total IN: 1890.7 mL    OUT:    Nasoenteral Tube: 50 mL    Ureteral Catheter: 2710 mL    VAC (Vacuum Assisted Closure) System: 350 mL  Total OUT: 3110 mL    Total NET: -1219.3 mL      07-06 @ 07:01  -  07-07 @ 01:31  --------------------------------------------------------  IN:    fat emulsion (Fish Oil and Plant Based) 20% Infusion: 100.2 mL    IV PiggyBack: 100 mL    TPN (Total Parenteral Nutrition): 1411 mL  Total IN: 1611.2 mL    OUT:    Ureteral Catheter: 1975 mL    VAC (Vacuum Assisted Closure) System: 200 mL  Total OUT: 2175 mL    Total NET: -563.8 mL          07-06    146<H>  |  111<H>  |  33<H>  ----------------------------<  219<H>  3.8   |  25  |  0.93    Ca    7.6<L>      06 Jul 2019 22:35  Phos  3.8     07-06  Mg     2.2     07-06      [ ] Ching catheter, indication: N/A  Meds: ascorbic acid IVPB 1500 milliGRAM(s) IV Intermittent every 6 hours  fat emulsion (Fish Oil and Plant Based) 20% Infusion 16.7 mL/Hr IV Continuous <Continuous>  Parenteral Nutrition - Adult 1 Each TPN Continuous <Continuous>        HEMATOLOGIC  Meds: enoxaparin Injectable 40 milliGRAM(s) SubCutaneous daily    [x] VTE Prophylaxis                        8.1    16.9  )-----------( 105      ( 06 Jul 2019 00:26 )             25.3     PT/INR - ( 06 Jul 2019 00:26 )   PT: 13.8 sec;   INR: 1.20 ratio         PTT - ( 06 Jul 2019 00:26 )  PTT:27.5 sec  Transfusion     [ ] PRBC   [ ] Platelets   [ ] FFP   [ ] Cryoprecipitate      INFECTIOUS DISEASES    RECENT CULTURES:  Specimen Source: .Surgical Swab None Specimen Source:  abdomen, esw  Date/Time: 07-02 @ 10:15  Culture Results:   Moderate Escherichia coli  Moderate Alpha hemolytic strep "Susceptibilities not performed"  Moderate Strep mitis oralis group "Susceptibilities not performed"  Growth in fluid media only Enterococcus faecalis  Gram Stain: --  Organism: Escherichia coli  Enterococcus faecalis    Meds: cefTRIAXone   IVPB 1000 milliGRAM(s) IV Intermittent every 24 hours  metroNIDAZOLE  IVPB 500 milliGRAM(s) IV Intermittent every 8 hours        ENDOCRINE  CAPILLARY BLOOD GLUCOSE      POCT Blood Glucose.: 214 mg/dL (06 Jul 2019 21:41)  POCT Blood Glucose.: 188 mg/dL (06 Jul 2019 17:48)  POCT Blood Glucose.: 186 mg/dL (06 Jul 2019 15:35)  POCT Blood Glucose.: 193 mg/dL (06 Jul 2019 10:16)  POCT Blood Glucose.: 190 mg/dL (06 Jul 2019 06:26)  POCT Blood Glucose.: 181 mg/dL (06 Jul 2019 01:53)    Meds: insulin lispro (HumaLOG) corrective regimen sliding scale   SubCutaneous every 4 hours        ACCESS DEVICES:  [ ] Peripheral IV  [ ] Central Venous Line	[ ] R	[ ] L	[ ] IJ	[ ] Fem	[ ] SC	Placed:   [ ] Arterial Line		[ ] R	[ ] L	[ ] Fem	[ ] Rad	[ ] Ax	Placed:   [ ] PICC:					[ ] Mediport  [ ] Urinary Catheter, Date Placed:   [x] Necessity of urinary, arterial, and venous catheters discussed    OTHER MEDICATIONS:  chlorhexidine 0.12% Liquid 15 milliLiter(s) Oral Mucosa <User Schedule>  chlorhexidine 2% Cloths 1 Application(s) Topical daily      CODE STATUS:      IMAGING:

## 2019-07-07 NOTE — PROGRESS NOTE ADULT - ATTENDING COMMENTS
Pt seen and examined.  Chart reviewed.  Resident note confirmed.  Pt is a 74 year old female with a medical history signficant for CAD/HTN/AF who presented to [facility] with perforated diverticulitis. Pt is s/p left hemicolectomy with ileostomy. Temporary abdominal closure was performed with abthera vac placement. Diuresis is in progress to facilitate abdominal closure.  No acute events overnight.       PMH/PSH/MEDS/ALL/SH/FH/ROS:  Unchanged from H&P above  Vitals/PE/Labs/Radiographic data:  Reviewed         A/P  Neuro:  Post op pain  	Continue pain control		    CVS:	S/p sepsis  	CAD/HTN/AF  	S/p resuscitation  	Continue diuresis with lasix.  Is now on TPN so may be more difficult to obtain negative fluid balance.                 Lasix is 40 mg Q 8 H  	Lactate has cleared  	Monitor vitals    Pulm:  	resp failure  	did not tolerate spontaneous breathing trial developed tachypnea   	Continue vent support  on PRVC mode.  Plan is for additional surgery likely tomorrow to attempt abdominal closure  	  GI:	Perforated diverticulitis  	S/p colectomy/colostomy  	Malnutrition  	patient is on TPN    :  	Resolving DIANNA  	Monitor and replace lytes  	Monitor I’s and O’s    Heme:   Anemia  	Pt does not accept blood products.  Last hematocrit 22.  Asked to minimize blood draws.    ID: 	Perforated diverticulitis, s/p rsn  	S/p sepsis  	Leukocytosis  	Continue ABX- ceftriaxone and flagyl.      Endo:	Hyperglycemia  	Continue glycemic control    50 minutes of critical care management applied

## 2019-07-07 NOTE — PROGRESS NOTE ADULT - ASSESSMENT
74F admitted with abdominal pain and exam findings consistent with peritonitis secondary to acute diverticulitis at the splenic flexure with evidence of contained perforation now POD3 s/p Exploratory laparotomy, left hemicolectomy, end colostomy, and ABThera Vac placement and POD4 and POD2 from RTOR for abdominal washout and abthera vac placement, off pressors.     PLAN:  NEURO: acute pain control  - Pain control with dilaudid PRN     RESPIRATORY: intubated, PMHx of Asthma  - F/u AM chest Xray  - F/u serial ABGS  - Wean mechanical ventilation  - SBT in am    CARDIOVASCULAR:  off pressors  - Cautious fluid resuscitation, appreciate heart failure recommendations  - metoprolol 5 q12h  - Hold home anti-hypertensives  - Trend lactate    GI/NUTRITION: s/p ex lap and L hemicolectomy with end colostomy and ABThera VAC placement and washout  - NPO, IVF, NGT   - continue TPN   - monitor for ostomy function    GENITOURINARY/RENAL: DIANNA on CKD stage 3  - Renally dose antibiotics  - continue diuresis - 40 lasix q8h  - Trend K+    HEMATOLOGIC: H/H stable  - Patient does NOT accept blood products  - Trend CBCs  - Hold ASA and A/C    INFECTIOUS DISEASE: Colonic perforation, cultures + for E.coli  - Ceftriaxone and Flagyl     ENDOCRINE:  - ISS, HgA1C  - Off solucortef    Dispo: SICU full code     SICU [id number] 74F admitted with abdominal pain and exam findings consistent with peritonitis secondary to acute diverticulitis at the splenic flexure with evidence of contained perforation now POD3 s/p Exploratory laparotomy, left hemicolectomy, end colostomy, and ABThera Vac placement and POD4 and POD2 from RTOR for abdominal washout and abthera vac placement, off pressors. unsuccessful abdominal closure due to abd wall edema, now under active diuresis.     PLAN:  NEURO: acute pain control  - Pain control with dilaudid PRN   - starting standing tylenol per GI tract    RESPIRATORY: intubated, PMHx of Asthma  - AM chest Xray improved basilar haziness  - F/u serial ABGS  - Wean mechanical ventilation  - SBT today    CARDIOVASCULAR:  off pressors  - restrict fluids   - follow up cardiology HF recs  - metoprolol 5 q12h  - Hold home anti-hypertensives  - Trend lactate daily    GI/NUTRITION: s/p ex lap and L hemicolectomy with end colostomy and ABThera VAC placement and washout  - NPO, IVF, NGT   - continue TPN   - monitor for ostomy function  - NG tube 50cc, bilious content  - abthera VAC serosang output    GENITOURINARY/RENAL: DIANNA on CKD stage 3  - DIANNA resolved Cr 0.78 stable  - continue diuresis - 40 lasix q8h + metolazone  - Trend K+    HEMATOLOGIC: H/H stable  - Patient does NOT accept blood products (is a Jehovah Witness).   - Trend CBC daily  - attempt to restrict blood draws: no coags tomorrow, and no gas   - Hold ASA and A/C    INFECTIOUS DISEASE: Colonic perforation, cultures + for E.coli  - Ceftriaxone and Flagyl   - wbc uptrend to 23 (21 and 16 the day before)    ENDOCRINE:  - ISS, HgA1C  - increase ins in TPN  - Off solucortef    MSK:  - PT for today    ro, PICC    Dispo: SICU full code

## 2019-07-07 NOTE — PROGRESS NOTE ADULT - ASSESSMENT
A/P:74F admitted with abdominal pain and exam findings consistent with peritonitis secondary to acute diverticulitis at the splenic flexure with evidence of contained perforation now POD3 s/p Exploratory laparotomy, left hemicolectomy, end colostomy, and ABThera Vac placement and POD2 from RTOR for abdominal washout and Abthera vac placement, off pressors. TPN started 7/5 for Protein-Calorie Malnutrition        - TPN formula @ goal: Carbohydrates: 210grams, Amino Acid: 120grams, 40g lipids in 2400 ml  - Pt on Ceftriaxone - can not run this in PICC  - Strict Intake and Output.  - Weights three times a week  - Monitor BMP, Mg+, Ionized Ca++, Phosphorus daily  - Check Triglycerides daily for first 3 days of TPN, then monthly   - Pre-albumin weekly.  - Hyperglycemia-  Increased to 20U insulin and will adjust accordingly as Dextrose increased today and based on FS.        Fingersticks to monitor glucose every 6 hours until stable then may be decreased to twice a day, coverage with ISS - to be ordered by primary team  - HyperNa- No NaAce or NaCl in TPN, only NaPhos 30mmol , increased TV to 2400ml  - hypophosphatemia- increased NaPhos to 30mmol  - Continue as per SICU/ Surgery, will follow with you      TPN team, pager [phone], spectra [phone]  D/w Adela Cross A/P:74F admitted with abdominal pain and exam findings consistent with peritonitis secondary to acute diverticulitis at the splenic flexure with evidence of contained perforation now POD3 s/p Exploratory laparotomy, left hemicolectomy, end colostomy, and ABThera Vac placement and POD2 from RTOR for abdominal washout and Abthera vac placement, off pressors. TPN started 7/5 for Protein-Calorie Malnutrition    - TPN formula @ goal: Carbohydrates: 210grams, Amino Acid: 120grams, 40g lipids in 2400 ml, Total volume decreased to 2.0L from 2400 ml  - Pt on Ceftriaxone - can not run this in PICC  - Strict Intake and Output.,   - Weights three times a week  - Monitor BMP, Mg+, Ionized Ca++, Phosphorus daily  - Check Triglycerides daily for first 3 days of TPN, then monthly   - Pre-albumin weekly.  - Hyperglycemia-  Increased to 35U insulin and will adjust accordingly as Dextrose increased today and based on FS.        Fingersticks to monitor glucose every 6 hours until stable then may be decreased to twice a day, coverage with ISS - to be ordered by primary team  - HyperNa- No NaAce or NaCl in TPN, only NaPhos 30mmol, increased TV to 2400ml originally, however SICU requesting volume be decreased since pt is being diuresed. TV decreased to 2.0l, and SICU advised to use Metalzaone, loop diuretic to improve the hypernatremia, and decreased dose of lasix as titrating fluid needs.  - hypophosphatemia- NaPhos at 30mmol  - Continue as per SICU/ Surgery, will follow with you  Please do not hold TPN when pt goes to OR tomorrow for washout.  If TPN stopped, please hang D10.  - Please order Urine lytes      TPN team, pager [phone], spectra [phone]  D/w Adela Cross

## 2019-07-07 NOTE — PROGRESS NOTE ADULT - SUBJECTIVE AND OBJECTIVE BOX
[facility] NUTRITION SUPPORT--  Attending/ PA FOLLOW UP NOTE      24 hour events/subjective: Pt started on TPN 7/5 and tolerating without issues. Unable to extubate, started 5 metoprolol q12. Receiving 20 lasix q8, increased to 40 lasix q8 overnight.      PAST HISTORY  --------------------------------------------------------------------------------  No significant changes to PMH, PSH, FHx, SHx, unless otherwise noted    ALLERGIES & MEDICATIONS  --------------------------------------------------------------------------------  Allergies    Coreg (Other)  digoxin (Other; Short breath (Mild to Mod))  Lopressor (Short breath)  penicillins (Hives)    Intolerances    metoprolol (Other)  Solu-Medrol (Other (Mild to Mod))    Standing Inpatient Medications  ALBUTerol/ipratropium for Nebulization 3 milliLiter(s) Nebulizer every 6 hours  ascorbic acid IVPB 1500 milliGRAM(s) IV Intermittent every 6 hours  buDESOnide   0.5 milliGRAM(s) Respule 0.5 milliGRAM(s) Inhalation every 12 hours  cefTRIAXone   IVPB 1000 milliGRAM(s) IV Intermittent every 24 hours  chlorhexidine 0.12% Liquid 15 milliLiter(s) Oral Mucosa <User Schedule>  chlorhexidine 2% Cloths 1 Application(s) Topical daily  enoxaparin Injectable 40 milliGRAM(s) SubCutaneous daily  fat emulsion (Fish Oil and Plant Based) 20% Infusion 16.7 mL/Hr IV Continuous <Continuous>  furosemide   Injectable 40 milliGRAM(s) IV Push every 8 hours  insulin lispro (HumaLOG) corrective regimen sliding scale   SubCutaneous every 4 hours  metoprolol tartrate Injectable 2.5 milliGRAM(s) IV Push every 12 hours  metroNIDAZOLE  IVPB 500 milliGRAM(s) IV Intermittent every 8 hours  pantoprazole  Injectable 40 milliGRAM(s) IV Push every 24 hours  Parenteral Nutrition - Adult 1 Each TPN Continuous <Continuous>    PRN Inpatient Medications  HYDROmorphone  Injectable 0.5 milliGRAM(s) IV Push every 3 hours PRN      REVIEW OF SYSTEMS  --------------------------------------------------------------------------------  Gen: as per HPI  Skin: No rashes  Head/Eyes/Ears/Mouth: No headache;No sore throat  Respiratory: No dyspnea, cough,   CV: No chest pain, PND, orthopnea  GI: as per HPI  : No increased frequency, dysuria, hematuria, nocturia  MSK: No joint pain/swelling; no back pain; no edema  Neuro: No dizziness/lightheadedness, weakness, seizures, numbness, tingling  Psych: No significant nervousness, anxiety, stress, depression    All other systems were reviewed and are negative, except as noted.      LABS/STUDIES  --------------------------------------------------------------------------------              7.8    21.7  >-----------<  121      [07-07-19 @ 01:53]              22.9     147  |  112  |  33  ----------------------------<  214      [07-07-19 @ 01:53]  3.8   |  25  |  0.93        Ca     7.6     [07-07-19 @ 01:53]      iCa    1.10     [07-06 @ 01:24]      Mg     2.0     [07-07-19 @ 01:53]      Phos  3.9     [07-07-19 @ 01:53]      PT/INR: PT 15.3 , INR 1.32       [07-07-19 @ 01:53]  PTT: 26.5       [07-07-19 @ 01:53]      Blood Gas Calcium, Ionized - Venous: 1.19 mmoL/L (07-07-19 @ 01:47)  Blood Gas Calcium, Ionized - Venous: 1.13 mmoL/L (07-06-19 @ 00:28)  Blood Gas Calcium, Ionized - Venous: 1.14 mmoL/L (07-05-19 @ 11:16)    Glucose, Serum: 214 mg/dL (07-07-19 @ 01:53)  Glucose, Serum: 219 mg/dL (07-06-19 @ 22:35)  Glucose, Serum: 177 mg/dL (07-06-19 @ 14:02)    PrealbuminPrealbumin, Serum: 5 mg/dL (07-04-19 @ 22:45)    Triglycerides      07-06-19 @ 07:01  -  07-07-19 @ 07:00  --------------------------------------------------------  IN: 2425.7 mL / OUT: 3500 mL / NET: -1074.3 mL        VITALS/PHYSICAL EXAM  --------------------------------------------------------------------------------  T(C): 37.4 (07-07-19 @ 03:00), Max: 37.4 (07-07-19 @ 03:00)  HR: 96 (07-07-19 @ 07:00) (96 - 121)  BP: 113/56 (07-07-19 @ 07:00) (110/58 - 152/56)  RR: 24 (07-07-19 @ 07:00) (22 - 24)  SpO2: 100% (07-07-19 @ 07:00) (99% - 100%)  Wt(kg): --  Height (cm): 160.02 (07-05-19 @ 09:44)  Weight (kg): 83.2 (07-05-19 @ 09:44)  BMI (kg/m2): 32.5 (07-05-19 @ 09:44)  BSA (m2): 1.86 (07-05-19 @ 09:44)    Physical Exam:    	Gen: Guarded, but stable, WN/ WD/ WG, sedated, intubated  	HEENT: NC/AT, PERRL, mucosa moist, supple neck, clear oropharynx  	Chest: equal chest exp b/l, non labored breathing  	GI: +softly distended, nontender, no BS                  (+)Ostomy pink & viable, no air nor stool                  ABthera intact w/ good seal              :  (+)Ching              MSK/Vascular: Passive ROM x4,  no clubbing, cyanosis, nor edema  	Neuro: unable to assess, attempts to open eyes when call her name  	Skin: Warm, good turgor, without rashes

## 2019-07-07 NOTE — PROGRESS NOTE ADULT - ATTENDING COMMENTS
Patient seen/examined.  Agree w above note and plan and have discussed plan w house staff.  Remains stable, diuresing nicely  OR tmw    Rafi Vázquez MD

## 2019-07-07 NOTE — PROGRESS NOTE ADULT - ASSESSMENT
73 y/o F with PMHx of HFrEF s/p AICD, asthma, HTN, afib on eliquis p/w abd pain s/p ex-lap, left hemicolectomy and end colostomy with abthera placement for perforated diverticulitis on 7/2, abdominal washout x2 (7/3, 7/5).  - Appreciate care by SICU team  - Consider diuresis in preparation for abdominal closure in OR  - Return to OR for abdominal washout and possible closure    Green [id number] 75 y/o F with PMHx of HFrEF s/p AICD, asthma, HTN, afib on eliquis p/w abd pain s/p ex-lap, left hemicolectomy and end colostomy with abthera placement for perforated diverticulitis on 7/2, abdominal washout x2 (7/3, 7/5).  - Appreciate care by SICU team  - Continue diuresis in preparation for abdominal closure in OR  - Return to OR for abdominal washout and possible closure    Green [id number]

## 2019-07-07 NOTE — PROGRESS NOTE ADULT - ATTENDING COMMENTS
seen and examined 07-07-19 @ 0750    intubated on mechanical vent (FiO2 = 30%, PEEP = +5)  soft / NT / ND  colostomy bag empty  ABThera in place  bilateral LE edema 1/2 to knees      sepsis from perforated diverticulitis with intraabdominal hypertension  7/2 - s/p Lupe's / ABThera  7/3 - s/p washout / ABThera  7/5 - washout / ABThera    moderate protein calorie malnutrition  -continue TPN at goal    type 2 DM  -increase insulin 20 -> 35 units    hypernatremia  -continue NaCl 0 mEq and NaAcetate 0 mEq  -continue total volume 2400 mL    hypophosphatemia  -continue NaPhos 30 mmol

## 2019-07-07 NOTE — PROGRESS NOTE ADULT - SUBJECTIVE AND OBJECTIVE BOX
Interval Events:    S: Patient intubated and sedated.    O: Vital Signs  T(C): 36.9 (07-06 @ 19:00), Max: 37.1 (07-06 @ 11:00)  HR: 102 (07-07 @ 01:08) (96 - 118)  BP: 129/60 (07-07 @ 00:00) (98/57 - 152/56)  RR: 24 (07-07 @ 00:00) (22 - 24)  SpO2: 100% (07-07 @ 01:08) (99% - 100%)  07-05-19 @ 07:01  -  07-06-19 @ 07:00  --------------------------------------------------------  IN: 1890.7 mL / OUT: 3110 mL / NET: -1219.3 mL    07-06-19 @ 07:01  -  07-07-19 @ 02:17  --------------------------------------------------------  IN: 1611.2 mL / OUT: 2175 mL / NET: -563.8 mL      General: NAD  Resp: ETT secured in place  CVS: regular rate and rhythm  Abdomen: softly distended, abthera vac in place with good seal, ostomy pink and viable, no gas in bag  Extremities: +edema  Skin: warm, dry, appropriate color                          8.1    16.9  )-----------( 105      ( 06 Jul 2019 00:26 )             25.3   07-06    146<H>  |  111<H>  |  33<H>  ----------------------------<  219<H>  3.8   |  25  |  0.93    Ca    7.6<L>      06 Jul 2019 22:35  Phos  3.8     07-06  Mg     2.2     07-06 Interval Events:  -unable to extubate  -started 5 metoprolol q12 after discussion w/ heart failure team  -continuing to [place], receiving 20 lasix q8, increased to 40 lasix q8 overnight    S: Patient intubated and sedated.    O: Vital Signs  T(C): 36.9 (07-06 @ 19:00), Max: 37.1 (07-06 @ 11:00)  HR: 102 (07-07 @ 01:08) (96 - 118)  BP: 129/60 (07-07 @ 00:00) (98/57 - 152/56)  RR: 24 (07-07 @ 00:00) (22 - 24)  SpO2: 100% (07-07 @ 01:08) (99% - 100%)  07-05-19 @ 07:01  -  07-06-19 @ 07:00  --------------------------------------------------------  IN: 1890.7 mL / OUT: 3110 mL / NET: -1219.3 mL    07-06-19 @ 07:01  -  07-07-19 @ 02:17  --------------------------------------------------------  IN: 1611.2 mL / OUT: 2175 mL / NET: -563.8 mL      General: NAD  Resp: ETT secured in place  CVS: regular rate and rhythm  Abdomen: softly distended, abthera vac in place with good seal, ostomy pink and viable, no gas in bag  Extremities: +edema  Skin: warm, dry, appropriate color                          8.1    16.9  )-----------( 105      ( 06 Jul 2019 00:26 )             25.3   07-06    146<H>  |  111<H>  |  33<H>  ----------------------------<  219<H>  3.8   |  25  |  0.93    Ca    7.6<L>      06 Jul 2019 22:35  Phos  3.8     07-06  Mg     2.2     07-06 Interval Events:  - Unable to extubate  - Started 5 metoprolol q12  - [facility] 20 lasix q8, increased to 40 lasix q8 overnight    S: Patient intubated and sedated.    O: Vital Signs  T(C): 36.9 (07-06 @ 19:00), Max: 37.1 (07-06 @ 11:00)  HR: 102 (07-07 @ 01:08) (96 - 118)  BP: 129/60 (07-07 @ 00:00) (98/57 - 152/56)  RR: 24 (07-07 @ 00:00) (22 - 24)  SpO2: 100% (07-07 @ 01:08) (99% - 100%)  07-05-19 @ 07:01  -  07-06-19 @ 07:00  --------------------------------------------------------  IN: 1890.7 mL / OUT: 3110 mL / NET: -1219.3 mL    07-06-19 @ 07:01  -  07-07-19 @ 02:17  --------------------------------------------------------  IN: 1611.2 mL / OUT: 2175 mL / NET: -563.8 mL      General: NAD  Resp: ETT secured in place  CVS: regular rate and rhythm  Abdomen: softly distended, abthera vac in place with good seal, ostomy pink and viable, no gas in bag  Extremities: +edema  Skin: warm, dry, appropriate color                          8.1    16.9  )-----------( 105      ( 06 Jul 2019 00:26 )             25.3   07-06    146<H>  |  111<H>  |  33<H>  ----------------------------<  219<H>  3.8   |  25  |  0.93    Ca    7.6<L>      06 Jul 2019 22:35  Phos  3.8     07-06  Mg     2.2     07-06

## 2019-07-08 ENCOUNTER — TRANSCRIPTION ENCOUNTER (OUTPATIENT)
Age: 74
End: 2019-07-08

## 2019-07-08 LAB
ANION GAP SERPL CALC-SCNC: 12 MMOL/L — SIGNIFICANT CHANGE UP (ref 5–17)
ANION GAP SERPL CALC-SCNC: 13 MMOL/L — SIGNIFICANT CHANGE UP (ref 5–17)
ANION GAP SERPL CALC-SCNC: 9 MMOL/L — SIGNIFICANT CHANGE UP (ref 5–17)
APTT BLD: 28.3 SEC — SIGNIFICANT CHANGE UP (ref 27.5–36.3)
BUN SERPL-MCNC: 34 MG/DL — HIGH (ref 7–23)
BUN SERPL-MCNC: 34 MG/DL — HIGH (ref 7–23)
BUN SERPL-MCNC: 35 MG/DL — HIGH (ref 7–23)
CALCIUM SERPL-MCNC: 7.5 MG/DL — LOW (ref 8.4–10.5)
CALCIUM SERPL-MCNC: 7.7 MG/DL — LOW (ref 8.4–10.5)
CALCIUM SERPL-MCNC: 8.1 MG/DL — LOW (ref 8.4–10.5)
CHLORIDE SERPL-SCNC: 102 MMOL/L — SIGNIFICANT CHANGE UP (ref 96–108)
CHLORIDE SERPL-SCNC: 103 MMOL/L — SIGNIFICANT CHANGE UP (ref 96–108)
CHLORIDE SERPL-SCNC: 106 MMOL/L — SIGNIFICANT CHANGE UP (ref 96–108)
CO2 SERPL-SCNC: 24 MMOL/L — SIGNIFICANT CHANGE UP (ref 22–31)
CO2 SERPL-SCNC: 25 MMOL/L — SIGNIFICANT CHANGE UP (ref 22–31)
CO2 SERPL-SCNC: 32 MMOL/L — HIGH (ref 22–31)
CREAT SERPL-MCNC: 0.68 MG/DL — SIGNIFICANT CHANGE UP (ref 0.5–1.3)
CREAT SERPL-MCNC: 0.78 MG/DL — SIGNIFICANT CHANGE UP (ref 0.5–1.3)
CREAT SERPL-MCNC: 0.81 MG/DL — SIGNIFICANT CHANGE UP (ref 0.5–1.3)
GAS PNL BLDV: SIGNIFICANT CHANGE UP
GLUCOSE BLDC GLUCOMTR-MCNC: 148 MG/DL — HIGH (ref 70–99)
GLUCOSE BLDC GLUCOMTR-MCNC: 182 MG/DL — HIGH (ref 70–99)
GLUCOSE BLDC GLUCOMTR-MCNC: 184 MG/DL — HIGH (ref 70–99)
GLUCOSE BLDC GLUCOMTR-MCNC: 189 MG/DL — HIGH (ref 70–99)
GLUCOSE BLDC GLUCOMTR-MCNC: 209 MG/DL — HIGH (ref 70–99)
GLUCOSE BLDC GLUCOMTR-MCNC: 239 MG/DL — HIGH (ref 70–99)
GLUCOSE BLDC GLUCOMTR-MCNC: 249 MG/DL — HIGH (ref 70–99)
GLUCOSE BLDC GLUCOMTR-MCNC: 251 MG/DL — HIGH (ref 70–99)
GLUCOSE BLDC GLUCOMTR-MCNC: 253 MG/DL — HIGH (ref 70–99)
GLUCOSE BLDC GLUCOMTR-MCNC: 254 MG/DL — HIGH (ref 70–99)
GLUCOSE BLDC GLUCOMTR-MCNC: 292 MG/DL — HIGH (ref 70–99)
GLUCOSE BLDC GLUCOMTR-MCNC: 325 MG/DL — HIGH (ref 70–99)
GLUCOSE SERPL-MCNC: 170 MG/DL — HIGH (ref 70–99)
GLUCOSE SERPL-MCNC: 227 MG/DL — HIGH (ref 70–99)
GLUCOSE SERPL-MCNC: 229 MG/DL — HIGH (ref 70–99)
INR BLD: 1.36 RATIO — HIGH (ref 0.88–1.16)
MAGNESIUM SERPL-MCNC: 1.8 MG/DL — SIGNIFICANT CHANGE UP (ref 1.6–2.6)
MAGNESIUM SERPL-MCNC: 1.8 MG/DL — SIGNIFICANT CHANGE UP (ref 1.6–2.6)
MAGNESIUM SERPL-MCNC: 1.9 MG/DL — SIGNIFICANT CHANGE UP (ref 1.6–2.6)
PHOSPHATE SERPL-MCNC: 3.8 MG/DL — SIGNIFICANT CHANGE UP (ref 2.5–4.5)
PHOSPHATE SERPL-MCNC: 4.1 MG/DL — SIGNIFICANT CHANGE UP (ref 2.5–4.5)
PHOSPHATE SERPL-MCNC: 4.3 MG/DL — SIGNIFICANT CHANGE UP (ref 2.5–4.5)
POTASSIUM SERPL-MCNC: 3.2 MMOL/L — LOW (ref 3.5–5.3)
POTASSIUM SERPL-MCNC: 3.7 MMOL/L — SIGNIFICANT CHANGE UP (ref 3.5–5.3)
POTASSIUM SERPL-MCNC: 7.9 MMOL/L — CRITICAL HIGH (ref 3.5–5.3)
POTASSIUM SERPL-SCNC: 3.2 MMOL/L — LOW (ref 3.5–5.3)
POTASSIUM SERPL-SCNC: 3.7 MMOL/L — SIGNIFICANT CHANGE UP (ref 3.5–5.3)
POTASSIUM SERPL-SCNC: 7.9 MMOL/L — CRITICAL HIGH (ref 3.5–5.3)
PROTHROM AB SERPL-ACNC: 15.7 SEC — HIGH (ref 10–12.9)
SODIUM SERPL-SCNC: 139 MMOL/L — SIGNIFICANT CHANGE UP (ref 135–145)
SODIUM SERPL-SCNC: 143 MMOL/L — SIGNIFICANT CHANGE UP (ref 135–145)
SODIUM SERPL-SCNC: 144 MMOL/L — SIGNIFICANT CHANGE UP (ref 135–145)
TRIGL SERPL-MCNC: 119 MG/DL — SIGNIFICANT CHANGE UP (ref 10–149)
TRIGL SERPL-MCNC: 98 MG/DL — SIGNIFICANT CHANGE UP (ref 10–149)
WBC # BLD: 23 K/UL — HIGH (ref 3.8–10.5)
WBC # FLD AUTO: 23 K/UL — HIGH (ref 3.8–10.5)

## 2019-07-08 PROCEDURE — 71045 X-RAY EXAM CHEST 1 VIEW: CPT | Mod: 26

## 2019-07-08 PROCEDURE — 99233 SBSQ HOSP IP/OBS HIGH 50: CPT

## 2019-07-08 RX ORDER — IRON SUCROSE 20 MG/ML
200 INJECTION, SOLUTION INTRAVENOUS EVERY 24 HOURS
Refills: 0 | Status: DISCONTINUED | OUTPATIENT
Start: 2019-07-08 | End: 2019-07-09

## 2019-07-08 RX ORDER — MAGNESIUM SULFATE 500 MG/ML
2 VIAL (ML) INJECTION ONCE
Refills: 0 | Status: COMPLETED | OUTPATIENT
Start: 2019-07-08 | End: 2019-07-08

## 2019-07-08 RX ORDER — DEXTROSE 50 % IN WATER 50 %
25 SYRINGE (ML) INTRAVENOUS
Refills: 0 | Status: DISCONTINUED | OUTPATIENT
Start: 2019-07-08 | End: 2019-07-08

## 2019-07-08 RX ORDER — POTASSIUM CHLORIDE 20 MEQ
20 PACKET (EA) ORAL EVERY 8 HOURS
Refills: 0 | Status: DISCONTINUED | OUTPATIENT
Start: 2019-07-08 | End: 2019-07-08

## 2019-07-08 RX ORDER — I.V. FAT EMULSION 20 G/100ML
16.7 EMULSION INTRAVENOUS
Qty: 40 | Refills: 0 | Status: DISCONTINUED | OUTPATIENT
Start: 2019-07-08 | End: 2019-07-09

## 2019-07-08 RX ORDER — POTASSIUM CHLORIDE 20 MEQ
20 PACKET (EA) ORAL
Refills: 0 | Status: COMPLETED | OUTPATIENT
Start: 2019-07-08 | End: 2019-07-09

## 2019-07-08 RX ORDER — POTASSIUM CHLORIDE 20 MEQ
20 PACKET (EA) ORAL
Refills: 0 | Status: COMPLETED | OUTPATIENT
Start: 2019-07-08 | End: 2019-07-08

## 2019-07-08 RX ORDER — INSULIN HUMAN 100 [IU]/ML
3 INJECTION, SOLUTION SUBCUTANEOUS
Qty: 100 | Refills: 0 | Status: DISCONTINUED | OUTPATIENT
Start: 2019-07-08 | End: 2019-07-09

## 2019-07-08 RX ORDER — ELECTROLYTE SOLUTION,INJ
1 VIAL (ML) INTRAVENOUS
Refills: 0 | Status: DISCONTINUED | OUTPATIENT
Start: 2019-07-08 | End: 2019-07-08

## 2019-07-08 RX ORDER — INSULIN HUMAN 100 [IU]/ML
3 INJECTION, SOLUTION SUBCUTANEOUS ONCE
Refills: 0 | Status: COMPLETED | OUTPATIENT
Start: 2019-07-08 | End: 2019-07-08

## 2019-07-08 RX ORDER — FUROSEMIDE 40 MG
40 TABLET ORAL EVERY 8 HOURS
Refills: 0 | Status: COMPLETED | OUTPATIENT
Start: 2019-07-08 | End: 2019-07-09

## 2019-07-08 RX ORDER — ACETAMINOPHEN 500 MG
975 TABLET ORAL EVERY 6 HOURS
Refills: 0 | Status: DISCONTINUED | OUTPATIENT
Start: 2019-07-08 | End: 2019-07-09

## 2019-07-08 RX ADMIN — Medication 40 MILLIGRAM(S): at 16:23

## 2019-07-08 RX ADMIN — CHLORHEXIDINE GLUCONATE 1 APPLICATION(S): 213 SOLUTION TOPICAL at 12:26

## 2019-07-08 RX ADMIN — Medication 0.5 MILLIGRAM(S): at 17:45

## 2019-07-08 RX ADMIN — CHLORHEXIDINE GLUCONATE 15 MILLILITER(S): 213 SOLUTION TOPICAL at 05:07

## 2019-07-08 RX ADMIN — Medication 1 EACH: at 17:17

## 2019-07-08 RX ADMIN — INSULIN HUMAN 3 UNIT(S)/HR: 100 INJECTION, SOLUTION SUBCUTANEOUS at 18:15

## 2019-07-08 RX ADMIN — Medication 975 MILLIGRAM(S): at 18:18

## 2019-07-08 RX ADMIN — INSULIN HUMAN 3 UNIT(S)/HR: 100 INJECTION, SOLUTION SUBCUTANEOUS at 20:36

## 2019-07-08 RX ADMIN — I.V. FAT EMULSION 16.7 ML/HR: 20 EMULSION INTRAVENOUS at 17:20

## 2019-07-08 RX ADMIN — HYDROMORPHONE HYDROCHLORIDE 0.5 MILLIGRAM(S): 2 INJECTION INTRAMUSCULAR; INTRAVENOUS; SUBCUTANEOUS at 22:19

## 2019-07-08 RX ADMIN — Medication 3 MILLILITER(S): at 05:35

## 2019-07-08 RX ADMIN — Medication 20 MILLIEQUIVALENT(S): at 12:27

## 2019-07-08 RX ADMIN — Medication 100 MILLIGRAM(S): at 07:46

## 2019-07-08 RX ADMIN — CEFTRIAXONE 100 MILLIGRAM(S): 500 INJECTION, POWDER, FOR SOLUTION INTRAMUSCULAR; INTRAVENOUS at 06:07

## 2019-07-08 RX ADMIN — Medication 2.5 MILLIGRAM(S): at 17:17

## 2019-07-08 RX ADMIN — Medication 1 EACH: at 07:46

## 2019-07-08 RX ADMIN — Medication 3 MILLILITER(S): at 11:16

## 2019-07-08 RX ADMIN — Medication 20 MILLIEQUIVALENT(S): at 22:57

## 2019-07-08 RX ADMIN — HYDROMORPHONE HYDROCHLORIDE 0.5 MILLIGRAM(S): 2 INJECTION INTRAMUSCULAR; INTRAVENOUS; SUBCUTANEOUS at 07:44

## 2019-07-08 RX ADMIN — Medication 50 MILLIEQUIVALENT(S): at 07:46

## 2019-07-08 RX ADMIN — PANTOPRAZOLE SODIUM 40 MILLIGRAM(S): 20 TABLET, DELAYED RELEASE ORAL at 05:07

## 2019-07-08 RX ADMIN — HYDROMORPHONE HYDROCHLORIDE 0.5 MILLIGRAM(S): 2 INJECTION INTRAMUSCULAR; INTRAVENOUS; SUBCUTANEOUS at 22:04

## 2019-07-08 RX ADMIN — Medication 1 EACH: at 20:36

## 2019-07-08 RX ADMIN — Medication 50 GRAM(S): at 06:11

## 2019-07-08 RX ADMIN — Medication 3 MILLILITER(S): at 00:57

## 2019-07-08 RX ADMIN — IRON SUCROSE 110 MILLIGRAM(S): 20 INJECTION, SOLUTION INTRAVENOUS at 11:22

## 2019-07-08 RX ADMIN — Medication 40 MILLIGRAM(S): at 07:46

## 2019-07-08 RX ADMIN — CHLORHEXIDINE GLUCONATE 15 MILLILITER(S): 213 SOLUTION TOPICAL at 13:54

## 2019-07-08 RX ADMIN — Medication 6: at 10:28

## 2019-07-08 RX ADMIN — Medication 3 MILLILITER(S): at 17:45

## 2019-07-08 RX ADMIN — HYDROMORPHONE HYDROCHLORIDE 0.5 MILLIGRAM(S): 2 INJECTION INTRAMUSCULAR; INTRAVENOUS; SUBCUTANEOUS at 08:00

## 2019-07-08 RX ADMIN — Medication 100 MILLIGRAM(S): at 16:24

## 2019-07-08 RX ADMIN — Medication 975 MILLIGRAM(S): at 12:26

## 2019-07-08 RX ADMIN — CHLORHEXIDINE GLUCONATE 15 MILLILITER(S): 213 SOLUTION TOPICAL at 22:04

## 2019-07-08 RX ADMIN — Medication 4: at 01:20

## 2019-07-08 RX ADMIN — Medication 50 GRAM(S): at 22:57

## 2019-07-08 RX ADMIN — Medication 975 MILLIGRAM(S): at 17:18

## 2019-07-08 RX ADMIN — Medication 4: at 05:08

## 2019-07-08 RX ADMIN — Medication 2.5 MILLIGRAM(S): at 05:07

## 2019-07-08 RX ADMIN — I.V. FAT EMULSION 16.7 ML/HR: 20 EMULSION INTRAVENOUS at 20:36

## 2019-07-08 RX ADMIN — Medication 0.5 MILLIGRAM(S): at 05:35

## 2019-07-08 RX ADMIN — ENOXAPARIN SODIUM 40 MILLIGRAM(S): 100 INJECTION SUBCUTANEOUS at 12:27

## 2019-07-08 RX ADMIN — Medication 20 MILLIEQUIVALENT(S): at 20:37

## 2019-07-08 RX ADMIN — Medication 50 MILLIEQUIVALENT(S): at 06:11

## 2019-07-08 RX ADMIN — INSULIN HUMAN 3 UNIT(S): 100 INJECTION, SOLUTION SUBCUTANEOUS at 14:40

## 2019-07-08 RX ADMIN — Medication 975 MILLIGRAM(S): at 13:26

## 2019-07-08 NOTE — PROGRESS NOTE ADULT - ASSESSMENT
ASSESSMENT  74F admitted with abdominal pain and exam findings consistent with peritonitis secondary to acute diverticulitis at the splenic flexure with evidence of contained perforation now POD3 s/p Exploratory laparotomy, left hemicolectomy, end colostomy, and ABThera Vac placement and POD4 and POD2 from RTOR for abdominal washout and abthera vac placement, off pressors.     PLAN:  NEURO: acute pain control  - Pain control with dilaudid PRN     RESPIRATORY: intubated, PMHx of Asthma  - F/u AM chest Xray  - F/u serial ABGS  - Continue mechanical ventilation through OR today    CARDIOVASCULAR: intermittent low-randy sinus tachycardia, off pressors  - Cautious fluid resuscitation, appreciate heart failure recommendations  - metoprolol 2.5 q12h  - Hold home anti-hypertensives  - Trend lactate    GI/NUTRITION: s/p ex lap and L hemicolectomy with end colostomy and ABThera VAC placement and washout  - NPO, IVF, NGT   - continue TPN - maximum volume of 2L per day  - monitor for ostomy function    GENITOURINARY/RENAL: DIANNA on CKD stage 3  - Renally dose antibiotics  - continue diuresis - 40 lasix q8h, had one dose of metolazone  - Trend K+    HEMATOLOGIC: H/H stable  - Patient does NOT accept blood products  - Lovenox for DVT ppx  - Hold ASA and A/C    INFECTIOUS DISEASE: Colonic perforation, cultures + for E.coli  - Ceftriaxone and Flagyl     ENDOCRINE:  - ISS, HgA1C  - Off solucortef

## 2019-07-08 NOTE — PROGRESS NOTE ADULT - SUBJECTIVE AND OBJECTIVE BOX
SICU DAILY PROGRESS NOTE    74y Female    24 HOUR EVENTS:    SUBJECTIVE/ROS:  [ ] A ten-point review of systems was otherwise negative except as noted.  [ ] Due to altered mental status/intubation, subjective information were not able to be obtained from the patient. History was obtained, to the extent possible, from review of the chart and collateral sources of information.      NEURO  RASS:     GCS:     CAM ICU:  Exam: awake, alert, oriented  Meds: HYDROmorphone  Injectable 0.5 milliGRAM(s) IV Push every 3 hours PRN pain    [x] Adequacy of sedation and pain control has been assessed and adjusted      RESPIRATORY  RR: 24 (07-08-19 @ 00:00) (12 - 25)  SpO2: 100% (07-08-19 @ 00:00) (99% - 100%)  Wt(kg): --  Exam: unlabored, clear to auscultation bilaterally  Mechanical Ventilation: Mode: AC/ CMV (Assist Control/ Continuous Mandatory Ventilation), RR (machine): 24, RR (patient): 24, TV (machine): 450, FiO2: 30, PEEP: 5, ITime: 0.9, MAP: 15, PIP: 35    [N/A] Extubation Readiness Assessed  Meds: ALBUTerol/ipratropium for Nebulization 3 milliLiter(s) Nebulizer every 6 hours  buDESOnide   0.5 milliGRAM(s) Respule 0.5 milliGRAM(s) Inhalation every 12 hours  tiotropium 18 MICROgram(s) Capsule 1 Capsule(s) Inhalation daily        CARDIOVASCULAR  HR: 101 (07-08-19 @ 00:00) (91 - 121)  BP: 101/55 (07-08-19 @ 00:00) (98/56 - 143/58)  BP(mean): 75 (07-08-19 @ 00:00) (72 - 100)  ABP: --  ABP(mean): --  Wt(kg): --  CVP(cm H2O): --  VBG - ( 07 Jul 2019 01:47 )  pH: 7.41  /  pCO2: 44    /  pO2: 46    / HCO3: 27    / Base Excess: 2.9   /  SaO2: 78     Lactate: 1.3                Exam: regular rate and rhythm  Cardiac Rhythm: sinus  Perfusion     [x]Adequate   [ ]Inadequate  Mentation   [x]Normal       [ ]Reduced  Extremities  [x]Warm         [ ]Cool  Volume Status [ ]Hypervolemic [x]Euvolemic [ ]Hypovolemic  Meds: metoprolol tartrate Injectable 2.5 milliGRAM(s) IV Push every 12 hours        GI/NUTRITION  Exam: soft, nontender, nondistended, incision C/D/I  Diet:  Meds: pantoprazole  Injectable 40 milliGRAM(s) IV Push every 24 hours      GENITOURINARY  I&O's Detail    07-06 @ 07:01  -  07-07 @ 07:00  --------------------------------------------------------  IN:    fat emulsion (Fish Oil and Plant Based) 20% Infusion: 183.7 mL    IV PiggyBack: 150 mL    Solution: 100 mL    TPN (Total Parenteral Nutrition): 1992 mL  Total IN: 2425.7 mL    OUT:    Nasoenteral Tube: 100 mL    Ureteral Catheter: 3050 mL    VAC (Vacuum Assisted Closure) System: 350 mL  Total OUT: 3500 mL    Total NET: -1074.3 mL      07-07 @ 07:01  -  07-08 @ 00:58  --------------------------------------------------------  IN:    fat emulsion (Fish Oil and Plant Based) 20% Infusion: 133.6 mL    TPN (Total Parenteral Nutrition): 1411 mL  Total IN: 1544.6 mL    OUT:    Nasoenteral Tube: 150 mL    Ureteral Catheter: 2810 mL  Total OUT: 2960 mL    Total NET: -1415.4 mL          07-07    147<H>  |  112<H>  |  33<H>  ----------------------------<  214<H>  3.8   |  25  |  0.93    Ca    7.6<L>      07 Jul 2019 01:53  Phos  3.9     07-07  Mg     2.0     07-07      [ ] Ching catheter, indication: N/A  Meds: fat emulsion (Fish Oil and Plant Based) 20% Infusion 16.7 mL/Hr IV Continuous <Continuous>  Parenteral Nutrition - Adult 1 Each TPN Continuous <Continuous>        HEMATOLOGIC  Meds: enoxaparin Injectable 40 milliGRAM(s) SubCutaneous daily    [x] VTE Prophylaxis                        7.8    21.7  )-----------( 121      ( 07 Jul 2019 01:53 )             22.9     PT/INR - ( 07 Jul 2019 01:53 )   PT: 15.3 sec;   INR: 1.32 ratio         PTT - ( 07 Jul 2019 01:53 )  PTT:26.5 sec  Transfusion     [ ] PRBC   [ ] Platelets   [ ] FFP   [ ] Cryoprecipitate      INFECTIOUS DISEASES  WBC Count: 21.7 K/uL (07-07 @ 01:53)    RECENT CULTURES:    Meds: cefTRIAXone   IVPB 1000 milliGRAM(s) IV Intermittent every 24 hours  metroNIDAZOLE  IVPB 500 milliGRAM(s) IV Intermittent every 8 hours        ENDOCRINE  CAPILLARY BLOOD GLUCOSE      POCT Blood Glucose.: 189 mg/dL (07 Jul 2019 21:35)  POCT Blood Glucose.: 146 mg/dL (07 Jul 2019 17:43)  POCT Blood Glucose.: 163 mg/dL (07 Jul 2019 15:23)  POCT Blood Glucose.: 193 mg/dL (07 Jul 2019 13:25)  POCT Blood Glucose.: 225 mg/dL (07 Jul 2019 10:31)  POCT Blood Glucose.: 206 mg/dL (07 Jul 2019 05:16)  POCT Blood Glucose.: 212 mg/dL (07 Jul 2019 01:41)    Meds: insulin lispro (HumaLOG) corrective regimen sliding scale   SubCutaneous every 4 hours        ACCESS DEVICES:  [ ] Peripheral IV  [ ] Central Venous Line	[ ] R	[ ] L	[ ] IJ	[ ] Fem	[ ] SC	Placed:   [ ] Arterial Line		[ ] R	[ ] L	[ ] Fem	[ ] Rad	[ ] Ax	Placed:   [ ] PICC:					[ ] Mediport  [ ] Urinary Catheter, Date Placed:   [x] Necessity of urinary, arterial, and venous catheters discussed    OTHER MEDICATIONS:  chlorhexidine 0.12% Liquid 15 milliLiter(s) Oral Mucosa <User Schedule>  chlorhexidine 2% Cloths 1 Application(s) Topical daily      CODE STATUS:      IMAGING: SICU DAILY PROGRESS NOTE    HPI  74y Female PMHx of HFrEF s/p AICD, asthma, HTN, HLD, atrial fibrillation on eliquis p/w severe, sudden onset L sided abd pain which started yesterday morning after eating. She endorsed nausea at the time but no vomiting. She did have any fevers, chills, diarrhea or hematochezia.      On 6/30, CTAP w/ IV contrast showed proximal descending colon/splenic flexure diverticulitis with small amount of free fluid and 3cm low attenuation structure in the right adnexa ?fluid collection ?ovarian cyst. She developed dyspnea and wheezing after the CT scan and became hypoxic. Bedside US int he ED demonstrated a few B lines. She received 80mg total of IV lasix, 125 of solumedrol, duonebs x2 and narcan and she was admitted to MICU for management of respiratory and heart failure.     Through the next day (7/1), she was persistently tachycardic with BPs as low as 76/50, MAPs in low 60s. She was bolused with 500cc of crystalloid without improvement in blood pressure. She was then started on phenylephrine. Her lactate was uptrending from 2 to 2.6 to 3 on the arterial blood gas. A repeat CT scan showed "Acute diverticulitis at the splenic flexure with evidence of contained perforation. No drainable regional collection. Increased ascites in the deep pelvis." She was transferred to the SICU for further HD monitoring. Antibiotics were switched to Aztreonam and flagyl. Bedside ultrasound showed IV 1.5 minimally collapsable and echo consistent with euvolemic state. Antibiotics again switched to Ceftriaxone and flagyl. OGT switched to NGT. Patient became tachypneic during SBT on 7/6 so she could not be extubated at that time. She was kept intubated for a plan to return to the OR on 7/8.      24 HOUR EVENTS:  - Continuing lasix 40 q8, given one dose of metolazone  - TPN volume reduced to a total of 2L per day  - WBC slightly uptrending    SUBJECTIVE/ROS:  [ ] A ten-point review of systems was otherwise negative except as noted.  [x] Due to altered mental status/intubation, subjective information were not able to be obtained from the patient. History was obtained, to the extent possible, from review of the chart and collateral sources of information.    NEURO  RASS:     GCS:     CAM ICU:  Exam: awake, alert, oriented  Meds: HYDROmorphone  Injectable 0.5 milliGRAM(s) IV Push every 3 hours PRN pain    [x] Adequacy of sedation and pain control has been assessed and adjusted    RESPIRATORY  RR: 24 (07-08-19 @ 00:00) (12 - 25)  SpO2: 100% (07-08-19 @ 00:00) (99% - 100%)  Exam: unlabored, clear to auscultation bilaterally  Mechanical Ventilation: Mode: AC/ CMV (Assist Control/ Continuous Mandatory Ventilation), RR (machine): 24, RR (patient): 24, TV (machine): 450, FiO2: 30, PEEP: 5, ITime: 0.9, MAP: 15, PIP: 35    [N/A] Extubation Readiness Assessed  Meds: ALBUTerol/ipratropium for Nebulization 3 milliLiter(s) Nebulizer every 6 hours  buDESOnide   0.5 milliGRAM(s) Respule 0.5 milliGRAM(s) Inhalation every 12 hours  tiotropium 18 MICROgram(s) Capsule 1 Capsule(s) Inhalation daily    CARDIOVASCULAR  HR: 101 (07-08-19 @ 00:00) (91 - 121)  BP: 101/55 (07-08-19 @ 00:00) (98/56 - 143/58)  BP(mean): 75 (07-08-19 @ 00:00) (72 - 100)  VBG - ( 07 Jul 2019 01:47 )  pH: 7.41  /  pCO2: 44    /  pO2: 46    / HCO3: 27    / Base Excess: 2.9   /  SaO2: 78     Lactate: 1.3      Exam: regular rate and rhythm, intermittently tachycardic to 102  Cardiac Rhythm: sinus  Perfusion     [x]Adequate   [ ]Inadequate  Mentation   [x]Normal       [ ]Reduced  Extremities  [x]Warm         [ ]Cool  Volume Status [ ]Hypervolemic [x]Euvolemic [ ]Hypovolemic  Meds: metoprolol tartrate Injectable 2.5 milliGRAM(s) IV Push every 12 hours    GI/NUTRITION  Exam: softly distended, abthera vac in place with good seal, ostomy appears viable, bowel sweat collecting in ostomy bag, no gas  Diet: NPO with TPN  Meds: pantoprazole  Injectable 40 milliGRAM(s) IV Push every 24 hours    GENITOURINARY  I&O's Detail    07-06 @ 07:01  -  07-07 @ 07:00  --------------------------------------------------------  IN:    fat emulsion (Fish Oil and Plant Based) 20% Infusion: 183.7 mL    IV PiggyBack: 150 mL    Solution: 100 mL    TPN (Total Parenteral Nutrition): 1992 mL  Total IN: 2425.7 mL    OUT:    Nasoenteral Tube: 100 mL    Ureteral Catheter: 3050 mL    VAC (Vacuum Assisted Closure) System: 350 mL  Total OUT: 3500 mL    Total NET: -1074.3 mL      07-07 @ 07:01  -  07-08 @ 00:58  --------------------------------------------------------  IN:    fat emulsion (Fish Oil and Plant Based) 20% Infusion: 133.6 mL    TPN (Total Parenteral Nutrition): 1411 mL  Total IN: 1544.6 mL    OUT:    Nasoenteral Tube: 150 mL    Ureteral Catheter: 2810 mL  Total OUT: 2960 mL    Total NET: -1415.4 mL    07-07    147<H>  |  112<H>  |  33<H>  ----------------------------<  214<H>  3.8   |  25  |  0.93    Ca    7.6<L>      07 Jul 2019 01:53  Phos  3.9     07-07  Mg     2.0     07-07    [x] Ching catheter, indication: N/A  Meds: fat emulsion (Fish Oil and Plant Based) 20% Infusion 16.7 mL/Hr IV Continuous <Continuous>  Parenteral Nutrition - Adult 1 Each TPN Continuous <Continuous>    HEMATOLOGIC  Meds: enoxaparin Injectable 40 milliGRAM(s) SubCutaneous daily    [x] VTE Prophylaxis                        7.8    21.7  )-----------( 121      ( 07 Jul 2019 01:53 )             22.9     PT/INR - ( 07 Jul 2019 01:53 )   PT: 15.3 sec;   INR: 1.32 ratio         PTT - ( 07 Jul 2019 01:53 )  PTT:26.5 sec  Transfusion     [ ] PRBC   [ ] Platelets   [ ] FFP   [ ] Cryoprecipitate      INFECTIOUS DISEASES  WBC Count: 21.7 K/uL (07-07 @ 01:53)    RECENT CULTURES:    Meds: cefTRIAXone   IVPB 1000 milliGRAM(s) IV Intermittent every 24 hours  metroNIDAZOLE  IVPB 500 milliGRAM(s) IV Intermittent every 8 hours    ENDOCRINE  CAPILLARY BLOOD GLUCOSE    POCT Blood Glucose.: 189 mg/dL (07 Jul 2019 21:35)  POCT Blood Glucose.: 146 mg/dL (07 Jul 2019 17:43)  POCT Blood Glucose.: 163 mg/dL (07 Jul 2019 15:23)  POCT Blood Glucose.: 193 mg/dL (07 Jul 2019 13:25)  POCT Blood Glucose.: 225 mg/dL (07 Jul 2019 10:31)  POCT Blood Glucose.: 206 mg/dL (07 Jul 2019 05:16)  POCT Blood Glucose.: 212 mg/dL (07 Jul 2019 01:41)    Meds: insulin lispro (HumaLOG) corrective regimen sliding scale   SubCutaneous every 4 hours    ACCESS DEVICES:  [ ] Peripheral IV  [ ] Central Venous Line	[ ] R	[ ] L	[ ] IJ	[ ] Fem	[ ] SC	Placed:   [ ] Arterial Line		[ ] R	[ ] L	[ ] Fem	[ ] Rad	[ ] Ax	Placed:   [x] PICC:					[ ] Mediport  [ ] Urinary Catheter, Date Placed:   [x] Necessity of urinary, arterial, and venous catheters discussed    OTHER MEDICATIONS:  chlorhexidine 0.12% Liquid 15 milliLiter(s) Oral Mucosa <User Schedule>  chlorhexidine 2% Cloths 1 Application(s) Topical daily    CODE STATUS: Full code

## 2019-07-08 NOTE — PROGRESS NOTE ADULT - ATTENDING COMMENTS
seen and examined 07-08-19 @ 1110    intubated on mechanical vent (FiO2 = 30%, PEEP = +5)  soft / NT / ND  colostomy bag without air or stool  ABThera in place  bilateral LE edema 1/3 to knees      sepsis from perforated diverticulitis with intraabdominal hypertension  7/2 - s/p Lupe's / ABThera  7/3 - s/p washout / ABThera  7/5 - washout / ABThera    moderate protein calorie malnutrition  -continue TPN at goal    type 2 DM  -increase insulin 20 -> 30 units (was not increased 20 -> 35 units yesterday as planned)    hypernatremia with fluid overload  -continue NaCl 0 mEq and NaAcetate 0 mEq  -add thiazide diuretic  -continue total volume 2000 mL    hypophosphatemia  -continue NaPhos 30 mmol

## 2019-07-08 NOTE — PROGRESS NOTE ADULT - SUBJECTIVE AND OBJECTIVE BOX
[facility] NUTRITION SUPPORT / TPN -- FOLLOW UP NOTE  --------------------------------------------------------------------------------    24 hour events/subjective:  Antibiotics were switched to Aztreonam and flagyl.   Bedside ultrasound showed IV 1.5 minimally collapsable and echo consistent with euvolemic state.  Antibiotics again switched to Ceftriaxone and flagyl.   OGT switched to NGT.   Patient became tachypneic during SBT on 7/6 so she could not be extubated at that time. She was kept intubated for a plan to return to the OR on 7/8.      24 HOUR EVENTS:  - Continuing lasix 40 q8, given one dose of metolazone  - TPN volume reduced to a total of 2L per day  - WBC slightly uptrending      Diet:  Diet, NPO:   Except Medications (07-07-19 @ 23:00)      Appetite: [ x ]Poor [  ]Adequate [  ]Good  Caloric intake:  [   ]  Adequate   [  x ] Inadequate    ROS: pt unable to offer    ALLERGIES & MEDICATIONS  --------------------------------------------------------------------------------  ALLERGIES  Coreg (Other)  digoxin (Other; Short breath (Mild to Mod))  Lopressor (Short breath)  penicillins (Hives)    INTOLERANCES  metoprolol (Other)  Solu-Medrol (Other (Mild to Mod))      STANDING INPATIENT MEDICATIONS    acetaminophen    Suspension .. 975 milliGRAM(s) Oral every 6 hours  ALBUTerol/ipratropium for Nebulization 3 milliLiter(s) Nebulizer every 6 hours  buDESOnide   0.5 milliGRAM(s) Respule 0.5 milliGRAM(s) Inhalation every 12 hours  cefTRIAXone   IVPB 1000 milliGRAM(s) IV Intermittent every 24 hours  chlorhexidine 0.12% Liquid 15 milliLiter(s) Oral Mucosa <User Schedule>  chlorhexidine 2% Cloths 1 Application(s) Topical daily  enoxaparin Injectable 40 milliGRAM(s) SubCutaneous daily  fat emulsion (Fish Oil and Plant Based) 20% Infusion 16.7 mL/Hr IV Continuous <Continuous>  furosemide   Injectable 40 milliGRAM(s) IV Push every 8 hours  insulin lispro (HumaLOG) corrective regimen sliding scale   SubCutaneous every 4 hours  iron sucrose IVPB 200 milliGRAM(s) IV Intermittent every 24 hours  metoprolol tartrate Injectable 2.5 milliGRAM(s) IV Push every 12 hours  metroNIDAZOLE  IVPB 500 milliGRAM(s) IV Intermittent every 8 hours  pantoprazole  Injectable 40 milliGRAM(s) IV Push every 24 hours  Parenteral Nutrition - Adult 1 Each TPN Continuous <Continuous>  Parenteral Nutrition - Adult 1 Each TPN Continuous <Continuous>  potassium chloride   Solution 20 milliEquivalent(s) Oral every 8 hours      PRN INPATIENT MEDICATION  HYDROmorphone  Injectable 0.5 milliGRAM(s) IV Push every 3 hours PRN        VITALS/PHYSICAL EXAM  --------------------------------------------------------------------------------  T(C): 37.2 (07-08-19 @ 11:00), Max: 37.4 (07-08-19 @ 08:00)  HR: 111 (07-08-19 @ 13:00) (91 - 126)  BP: 118/56 (07-08-19 @ 13:00) (101/55 - 135/60)  RR: 24 (07-08-19 @ 13:00) (12 - 30)  SpO2: 100% (07-08-19 @ 13:00) (98% - 100%)  Wt(kg): --        07-07-19 @ 07:01  -  07-08-19 @ 07:00  --------------------------------------------------------  IN: 2392.4 mL / OUT: 4270 mL / NET: -1877.6 mL    07-08-19 @ 07:01  -  07-08-19 @ 13:51  --------------------------------------------------------  IN: 498 mL / OUT: 800 mL / NET: -302 mL    Physical Exam:    	Gen: Guarded, but stable, WN/ WD/ WG, sedated, intubated  	HEENT: NC/AT, PERRL, mucosa moist, supple neck, clear oropharynx  	Chest: equal chest exp b/l, non labored breathing  	GI: +softly distended, nontender, no BS                  (+)Ostomy pink & viable, (+)effulence noted                  ABthera intact w/ good seal              :  (+)Ching              MSK/Vascular: Passive ROM x4,  no clubbing, cyanosis, nor edema  	Neuro: unable to assess, attempts to open eyes when call her name  	Skin: Warm, good turgor, without rashes        LABS/ CULTURES/ RADIOLOGY:              x      23.0  >-----------<  x        [07-08-19 @ 02:36]              x        144  |  106  |  34  ----------------------------<  229      [07-08-19 @ 04:24]  3.7   |  25  |  0.78        Ca     7.7     [07-08-19 @ 04:24]      Mg     1.8     [07-08-19 @ 04:24]      Phos  3.8     [07-08-19 @ 04:24]      PT/INR: PT 15.7 , INR 1.36       [07-08-19 @ 02:35]  PTT: 28.3       [07-08-19 @ 02:35]      Blood Gas Calcium, Ionized - Venous: 1.19 mmoL/L (07-08-19 @ 02:25)  Blood Gas Calcium, Ionized - Venous: 1.19 mmoL/L (07-07-19 @ 01:47)    Triglycerides, Serum: 119 mg/dL (07.08.19 @ 04:24)  Triglycerides, Serum: 102 mg/dL (07.07.19 @ 04:36)  Triglycerides, Serum: 23 mg/dL (07.06.19 @ 04:56)        CAPILLARY BLOOD GLUCOSE    POCT Blood Glucose.: 325 mg/dL (08 Jul 2019 13:49)  POCT Blood Glucose.: 251 mg/dL (08 Jul 2019 10:26)  POCT Blood Glucose.: 209 mg/dL (08 Jul 2019 05:05)  POCT Blood Glucose.: 239 mg/dL (08 Jul 2019 01:13)  POCT Blood Glucose.: 189 mg/dL (07 Jul 2019 21:35)  POCT Blood Glucose.: 146 mg/dL (07 Jul 2019 17:43)  POCT Blood Glucose.: 163 mg/dL (07 Jul 2019 15:23)    Prealbumin, Serum: 5 mg/dL (07-04-19 @ 22:45)

## 2019-07-08 NOTE — CHART NOTE - NSCHARTNOTEFT_GEN_A_CORE
Nutrition Follow Up Note    Patient seen for: nutrition/TPN Team follow up    Source: medical records, TPN Team rounds    Chart reviewed, events noted. 74F admitted with abdominal pain and exam findings consistent with peritonitis secondary to acute diverticulitis at the splenic flexure with evidence of contained perforation now POD3 s/p Exploratory laparotomy, left hemicolectomy, end colostomy, and ABThera Vac placement and POD4 and POD2 from RTOR for abdominal washout and abthera vac placement, off pressors. "    TPN initiated , volume reduced to max 2L in setting of diuresis. Pt previously noted with hypernatremia; TPN contains no NaCl or sodium acetate. Pt noted with hyperglycemia; 20 units of insulin added to TPN. Pt previously noted with hypophosphatemia; 30mmol sodium phosphate in TPN.    Diet : NPO except meds     Parenteral Nutrition: TPN (ordered ) infusing at 83ml/hr (105Gm amino acids, 280Gm dextrose, 35Gm SMOF lipids) to provide 1722cal/day (21cal/Kg and 1.3Gm protein/Kg per dosing wt 83.2Kg); with 10ml MVI, 3ml MTE-5.    Per IBW 52.3Kg, TPN meets 33cal/Kg and 2Gm protein/Kg.    NGT output: 200ml  Colostomy output: 10ml  VAC: 0ml    Daily Weight in k.5 (-08), Weight in k.8 (-), Weight in k.6 (-), Weight in k.3 (-), Weight in k.2 (); weight change likely reflects fluid shifts and diuresis    Drug Dosing Weight  Weight (kg): 83.2 (2019 09:44)  BMI (kg/m2): 32.5 (2019 09:44)    IBW 52.3Kg    Pertinent Medications: MEDICATIONS  (STANDING):  ALBUTerol/ipratropium for Nebulization 3 milliLiter(s) Nebulizer every 6 hours  buDESOnide   0.5 milliGRAM(s) Respule 0.5 milliGRAM(s) Inhalation every 12 hours  cefTRIAXone   IVPB 1000 milliGRAM(s) IV Intermittent every 24 hours  chlorhexidine 0.12% Liquid 15 milliLiter(s) Oral Mucosa <User Schedule>  chlorhexidine 2% Cloths 1 Application(s) Topical daily  enoxaparin Injectable 40 milliGRAM(s) SubCutaneous daily  fat emulsion (Fish Oil and Plant Based) 20% Infusion 16.7 mL/Hr (16.7 mL/Hr) IV Continuous <Continuous>  furosemide   Injectable 40 milliGRAM(s) IV Push every 8 hours  insulin lispro (HumaLOG) corrective regimen sliding scale   SubCutaneous every 4 hours  metolazone 10 milliGRAM(s) Oral once  metoprolol tartrate Injectable 2.5 milliGRAM(s) IV Push every 12 hours  metroNIDAZOLE  IVPB 500 milliGRAM(s) IV Intermittent every 8 hours  pantoprazole  Injectable 40 milliGRAM(s) IV Push every 24 hours  Parenteral Nutrition - Adult 1 Each (83 mL/Hr) TPN Continuous <Continuous>  potassium chloride   Solution 20 milliEquivalent(s) Oral every 8 hours  tiotropium 18 MICROgram(s) Capsule 1 Capsule(s) Inhalation daily    MEDICATIONS  (PRN):  HYDROmorphone  Injectable 0.5 milliGRAM(s) IV Push every 3 hours PRN pain      LABS:    @ 04:24: Sodium 144, Potassium 3.7, Chloride 106, Calcium 7.7<L>, Magnesium 1.8, Phosphorus 3.8, BUN 34<H>, Creatinine 0.78, <H>,    @ 02:35: Sodium 139, Potassium 7.9<HH>, Chloride 103, Calcium 7.5<L>, Magnesium 1.9, Phosphorus 4.3, BUN 34<H>, Creatinine 0.68, <H>    Triglycerides 119    POCT Blood Glucose.: 209 mg/dL (2019 05:05)  POCT Blood Glucose.: 239 mg/dL (2019 01:13)  POCT Blood Glucose.: 189 mg/dL (2019 21:35)  POCT Blood Glucose.: 146 mg/dL (2019 17:43)  POCT Blood Glucose.: 163 mg/dL (2019 15:23)  POCT Blood Glucose.: 193 mg/dL (2019 13:25)  POCT Blood Glucose.: 225 mg/dL (2019 10:31)    Skin per nursing documentation: no pressure injuries noted  Edema: 3+ dependent    Estimated Needs:       Previous Nutrition Diagnosis: Inadequate Protein-Energy Intake  Nutrition Diagnosis is: ongoing, being addressed with TPN    New Nutrition Diagnosis: none     Interventions:     Recommend  1) TPN per TPN Team/Nutrition assessment      Monitoring and Evaluation:     Continue to monitor nutritional intake, tolerance to diet prescription, weights, labs, skin integrity    RD remains available upon request and will follow up per protocol    Emilie Gaffney MS RD N [facility], Pager # [phone] Nutrition Follow Up Note    Patient seen for: nutrition/TPN Team follow up    Source: medical records, TPN Team rounds    Chart reviewed, events noted. 74F admitted with abdominal pain and exam findings consistent with peritonitis secondary to acute diverticulitis at the splenic flexure with evidence of contained perforation now POD3 s/p Exploratory laparotomy, left hemicolectomy, end colostomy, and ABThera Vac placement and POD4 and POD2 from RTOR for abdominal washout and abthera vac placement, off pressors. "    TPN initiated , volume reduced to max 2L in setting of diuresis. Pt previously noted with hypernatremia; TPN contains no NaCl or sodium acetate. Pt noted with hyperglycemia; 20 units of insulin added to TPN. Pt previously noted with hypophosphatemia; 30mmol sodium phosphate in TPN.    Diet : NPO except meds     Parenteral Nutrition: TPN (ordered ) infusing at 83ml/hr (105Gm amino acids, 280Gm dextrose, 35Gm SMOF lipids) to provide 1722cal/day (21cal/Kg and 1.3Gm protein/Kg per dosing wt 83.2Kg); with 10ml MVI, 3ml MTE-5.    Per IBW 52.3Kg, TPN meets 33cal/Kg and 2Gm protein/Kg.    NGT output: 200ml  Colostomy output: 10ml  VAC: 0ml    Daily Weight in k.5 (-08), Weight in k.8 (-), Weight in k.6 (-), Weight in k.3 (-), Weight in k.2 (); weight change likely reflects fluid shifts and diuresis    Drug Dosing Weight  Weight (kg): 83.2 (2019 09:44)  BMI (kg/m2): 32.5 (2019 09:44)    IBW 52.3Kg    Pertinent Medications: MEDICATIONS  (STANDING):  ALBUTerol/ipratropium for Nebulization 3 milliLiter(s) Nebulizer every 6 hours  buDESOnide   0.5 milliGRAM(s) Respule 0.5 milliGRAM(s) Inhalation every 12 hours  cefTRIAXone   IVPB 1000 milliGRAM(s) IV Intermittent every 24 hours  chlorhexidine 0.12% Liquid 15 milliLiter(s) Oral Mucosa <User Schedule>  chlorhexidine 2% Cloths 1 Application(s) Topical daily  enoxaparin Injectable 40 milliGRAM(s) SubCutaneous daily  fat emulsion (Fish Oil and Plant Based) 20% Infusion 16.7 mL/Hr (16.7 mL/Hr) IV Continuous <Continuous>  furosemide   Injectable 40 milliGRAM(s) IV Push every 8 hours  insulin lispro (HumaLOG) corrective regimen sliding scale   SubCutaneous every 4 hours  metolazone 10 milliGRAM(s) Oral once  metoprolol tartrate Injectable 2.5 milliGRAM(s) IV Push every 12 hours  metroNIDAZOLE  IVPB 500 milliGRAM(s) IV Intermittent every 8 hours  pantoprazole  Injectable 40 milliGRAM(s) IV Push every 24 hours  Parenteral Nutrition - Adult 1 Each (83 mL/Hr) TPN Continuous <Continuous>  potassium chloride   Solution 20 milliEquivalent(s) Oral every 8 hours  tiotropium 18 MICROgram(s) Capsule 1 Capsule(s) Inhalation daily    MEDICATIONS  (PRN):  HYDROmorphone  Injectable 0.5 milliGRAM(s) IV Push every 3 hours PRN pain      LABS:    @ 04:24: Sodium 144, Potassium 3.7, Chloride 106, Calcium 7.7<L>, Magnesium 1.8, Phosphorus 3.8, BUN 34<H>, Creatinine 0.78, <H>,    @ 02:35: Sodium 139, Potassium 7.9<HH>, Chloride 103, Calcium 7.5<L>, Magnesium 1.9, Phosphorus 4.3, BUN 34<H>, Creatinine 0.68, <H>    Triglycerides 119    POCT Blood Glucose.: 209 mg/dL (2019 05:05)  POCT Blood Glucose.: 239 mg/dL (2019 01:13)  POCT Blood Glucose.: 189 mg/dL (2019 21:35)  POCT Blood Glucose.: 146 mg/dL (2019 17:43)  POCT Blood Glucose.: 163 mg/dL (2019 15:23)  POCT Blood Glucose.: 193 mg/dL (2019 13:25)  POCT Blood Glucose.: 225 mg/dL (2019 10:31)    Skin per nursing documentation: no pressure injuries noted  Edema: 3+ dependent    Estimated Needs:   0082-3350 mark/day (15-20cal/Kg per dosing wt 83.2      The modified [place] State equation (WARREN) 2010 equation was used to calculator resting energy expenditure: 1492 mark/day (18cal/Kg per dosing wt 83.2Kg)    Previous Nutrition Diagnosis: Inadequate Protein-Energy Intake  Nutrition Diagnosis is: ongoing, being addressed with TPN    New Nutrition Diagnosis: none     Interventions:     Recommend  1) TPN per TPN Team/Nutrition assessment      Monitoring and Evaluation:     Continue to monitor nutritional intake, tolerance to diet prescription, weights, labs, skin integrity    RD remains available upon request and will follow up per protocol    Emilie Gaffney MS RD CDN [facility], Pager # [phone] Nutrition Follow Up Note    Patient seen for: nutrition/TPN Team follow up    Source: medical records, TPN Team rounds    Chart reviewed, events noted. 74F admitted with abdominal pain and exam findings consistent with peritonitis secondary to acute diverticulitis at the splenic flexure with evidence of contained perforation now POD3 s/p Exploratory laparotomy, left hemicolectomy, end colostomy, and ABThera Vac placement and POD4 and POD2 from RTOR for abdominal washout and abthera vac placement, off pressors. "    Pt NPO as of . TPN initiated , currently tolerating at goal. TPN volume reduced to max 2L in setting of diuresis. Pt previously noted with hypernatremia; TPN contains no NaCl or sodium acetate. Pt noted with hyperglycemia; 20 units of insulin added to TPN. Pt previously noted with hypophosphatemia; 30mmol sodium phosphate in TPN.    Diet : NPO except meds     Parenteral Nutrition: TPN (ordered ) infusing at 83ml/hr (105Gm amino acids, 280Gm dextrose, 35Gm SMOF lipids) to provide 1722cal/day (21cal/Kg and 1.3Gm protein/Kg per dosing wt 83.2Kg); with 10ml MVI, 3ml MTE-5.    Per IBW 52.3Kg, TPN meets 33cal/Kg and 2Gm protein/Kg.    NGT output: 200ml  Colostomy output: 10ml  VAC: 0ml    Daily Weight in k.5 (-08), Weight in k.8 (-), Weight in k.6 (-), Weight in k.3 (), Weight in k.2 (-); weight change likely reflects fluid shifts and diuresis    Drug Dosing Weight  Weight (kg): 83.2 (2019 09:44)  BMI (kg/m2): 32.5 (2019 09:44)    IBW 52.3Kg    Pertinent Medications: MEDICATIONS  (STANDING):  ALBUTerol/ipratropium for Nebulization 3 milliLiter(s) Nebulizer every 6 hours  buDESOnide   0.5 milliGRAM(s) Respule 0.5 milliGRAM(s) Inhalation every 12 hours  cefTRIAXone   IVPB 1000 milliGRAM(s) IV Intermittent every 24 hours  chlorhexidine 0.12% Liquid 15 milliLiter(s) Oral Mucosa <User Schedule>  chlorhexidine 2% Cloths 1 Application(s) Topical daily  enoxaparin Injectable 40 milliGRAM(s) SubCutaneous daily  fat emulsion (Fish Oil and Plant Based) 20% Infusion 16.7 mL/Hr (16.7 mL/Hr) IV Continuous <Continuous>  furosemide   Injectable 40 milliGRAM(s) IV Push every 8 hours  insulin lispro (HumaLOG) corrective regimen sliding scale   SubCutaneous every 4 hours  metolazone 10 milliGRAM(s) Oral once  metoprolol tartrate Injectable 2.5 milliGRAM(s) IV Push every 12 hours  metroNIDAZOLE  IVPB 500 milliGRAM(s) IV Intermittent every 8 hours  pantoprazole  Injectable 40 milliGRAM(s) IV Push every 24 hours  Parenteral Nutrition - Adult 1 Each (83 mL/Hr) TPN Continuous <Continuous>  potassium chloride   Solution 20 milliEquivalent(s) Oral every 8 hours  tiotropium 18 MICROgram(s) Capsule 1 Capsule(s) Inhalation daily    MEDICATIONS  (PRN):  HYDROmorphone  Injectable 0.5 milliGRAM(s) IV Push every 3 hours PRN pain      LABS:    @ 04:24: Sodium 144, Potassium 3.7, Chloride 106, Calcium 7.7<L>, Magnesium 1.8, Phosphorus 3.8, BUN 34<H>, Creatinine 0.78, <H>,    @ 02:35: Sodium 139, Potassium 7.9<HH>, Chloride 103, Calcium 7.5<L>, Magnesium 1.9, Phosphorus 4.3, BUN 34<H>, Creatinine 0.68, <H>    Triglycerides 119    POCT Blood Glucose.: 209 mg/dL (2019 05:05)  POCT Blood Glucose.: 239 mg/dL (2019 01:13)  POCT Blood Glucose.: 189 mg/dL (2019 21:35)  POCT Blood Glucose.: 146 mg/dL (2019 17:43)  POCT Blood Glucose.: 163 mg/dL (2019 15:23)  POCT Blood Glucose.: 193 mg/dL (2019 13:25)  POCT Blood Glucose.: 225 mg/dL (2019 10:31)    Skin per nursing documentation: no pressure injuries noted  Edema: 3+ dependent    Estimated Needs: with consideration for intubation and BMI>30  3041-8482 mark/day (15-20cal/Kg per dosing wt 83.2Kg)  94-105Gm protein/day (1.8-2Gm/Kg per IBW 52.3Kg)    The modified [place] State equation (WARREN) 2010 equation was used to calculator resting energy expenditure: 1492 mark/day (18cal/Kg per dosing wt 83.2Kg)    Previous Nutrition Diagnosis: Inadequate Protein-Energy Intake  Nutrition Diagnosis is: ongoing, being addressed with TPN    New Nutrition Diagnosis: none     Interventions:     Recommend  1) TPN per TPN Team/Nutrition assessment      Monitoring and Evaluation:     Continue to monitor nutritional intake, tolerance to diet prescription, weights, labs, skin integrity    RD remains available upon request and will follow up per protocol    Emilie Gaffney MS RD N [facility], Pager # [phone] Nutrition Follow Up Note    Patient seen for: nutrition/TPN follow up    Source: medical records, TPN Team rounds    Chart reviewed, events noted. 74F admitted with abdominal pain and exam findings consistent with peritonitis secondary to acute diverticulitis at the splenic flexure with evidence of contained perforation now POD3 s/p Exploratory laparotomy, left hemicolectomy, end colostomy, and ABThera Vac placement and POD4 and POD2 from RTOR for abdominal washout and abthera vac placement, off pressors. " Plan for RTOR today.    Pt NPO as of . TPN initiated , currently tolerating at goal. TPN volume reduced to max 2L in setting of diuresis. Pt previously noted with hypernatremia; TPN contains no NaCl or sodium acetate. Pt previously noted with hypophosphatemia; 30mmol sodium phosphate in TPN. Hyperglycemia noted; insulin in TPN increased from 20 to 30 units.    Diet : NPO except meds     Parenteral Nutrition: TPN (ordered ) infusing at 83ml/hr (105Gm amino acids, 280Gm dextrose, 35Gm SMOF lipids) to provide 1722cal/day (21cal/Kg and 1.3Gm protein/Kg per dosing wt 83.2Kg); with 10ml MVI, 3ml MTE-5.    Per IBW 52.3Kg, TPN meets 33cal/Kg and 2Gm protein/Kg.    NGT output: 200ml  Colostomy output: 10ml  VAC: 0ml    Daily Weight in k.5 (-08), Weight in k.8 (-), Weight in k.6 (), Weight in k.3 (-), Weight in k.2 (-); weight change likely reflects fluid shifts and diuresis    Drug Dosing Weight  Weight (kg): 83.2 (2019 09:44)  BMI (kg/m2): 32.5 (2019 09:44)    IBW 52.3Kg    Pertinent Medications: MEDICATIONS  (STANDING):  ALBUTerol/ipratropium for Nebulization 3 milliLiter(s) Nebulizer every 6 hours  buDESOnide   0.5 milliGRAM(s) Respule 0.5 milliGRAM(s) Inhalation every 12 hours  cefTRIAXone   IVPB 1000 milliGRAM(s) IV Intermittent every 24 hours  chlorhexidine 0.12% Liquid 15 milliLiter(s) Oral Mucosa <User Schedule>  chlorhexidine 2% Cloths 1 Application(s) Topical daily  enoxaparin Injectable 40 milliGRAM(s) SubCutaneous daily  fat emulsion (Fish Oil and Plant Based) 20% Infusion 16.7 mL/Hr (16.7 mL/Hr) IV Continuous <Continuous>  furosemide   Injectable 40 milliGRAM(s) IV Push every 8 hours  insulin lispro (HumaLOG) corrective regimen sliding scale   SubCutaneous every 4 hours  metolazone 10 milliGRAM(s) Oral once  metoprolol tartrate Injectable 2.5 milliGRAM(s) IV Push every 12 hours  metroNIDAZOLE  IVPB 500 milliGRAM(s) IV Intermittent every 8 hours  pantoprazole  Injectable 40 milliGRAM(s) IV Push every 24 hours  Parenteral Nutrition - Adult 1 Each (83 mL/Hr) TPN Continuous <Continuous>  potassium chloride   Solution 20 milliEquivalent(s) Oral every 8 hours  tiotropium 18 MICROgram(s) Capsule 1 Capsule(s) Inhalation daily    MEDICATIONS  (PRN):  HYDROmorphone  Injectable 0.5 milliGRAM(s) IV Push every 3 hours PRN pain      LABS:    @ 04:24: Sodium 144, Potassium 3.7, Chloride 106, Calcium 7.7<L>, Magnesium 1.8, Phosphorus 3.8, BUN 34<H>, Creatinine 0.78, <H>,    @ 02:35: Sodium 139, Potassium 7.9<HH>, Chloride 103, Calcium 7.5<L>, Magnesium 1.9, Phosphorus 4.3, BUN 34<H>, Creatinine 0.68, <H>    Triglycerides 119    POCT Blood Glucose.: 209 mg/dL (2019 05:05)  POCT Blood Glucose.: 239 mg/dL (2019 01:13)  POCT Blood Glucose.: 189 mg/dL (2019 21:35)  POCT Blood Glucose.: 146 mg/dL (2019 17:43)  POCT Blood Glucose.: 163 mg/dL (2019 15:23)  POCT Blood Glucose.: 193 mg/dL (2019 13:25)  POCT Blood Glucose.: 225 mg/dL (2019 10:31)    Skin per nursing documentation: no pressure injuries noted  Edema: 3+ dependent    Estimated Needs: with consideration for intubation and BMI>30  0408-6237 mark/day (15-20cal/Kg per dosing wt 83.2Kg)  94-105Gm protein/day (1.8-2Gm/Kg per IBW 52.3Kg)    The modified [place] State equation (WARREN) 2010 equation was used to calculator resting energy expenditure: 1492 mark/day (18cal/Kg per dosing wt 83.2Kg)    Previous Nutrition Diagnosis: Inadequate Protein-Energy Intake  Nutrition Diagnosis is: ongoing, being addressed with TPN    New Nutrition Diagnosis: none     Interventions:     Recommend  1) TPN per TPN Team/Nutrition assessment      Monitoring and Evaluation:     Continue to monitor nutritional intake, tolerance to diet prescription, weights, labs, skin integrity    RD remains available upon request and will follow up per protocol    Emilie Gaffney MS RD N [facility], Pager # [phone]

## 2019-07-08 NOTE — PROGRESS NOTE ADULT - SUBJECTIVE AND OBJECTIVE BOX
Interval Events:  - Continues to have tachycardia, but now off pressors  - WBC slightly uptrending  - Continuing to diurese: now dose 40 q8 and a dose of metolazone    S: Patient intubated and sedated.    O:   Vital Signs        I&O      General: NAD  Resp: ETT secured in place  CVS: regular rate and rhythm  Abdomen: softly distended, abthera vac in place with good seal, ostomy pink and viable, no gas in bag  Extremities: +edema  Skin: warm, dry, appropriate color                           7.8    21.7  )-----------( 121      ( 07 Jul 2019 01:53 )             22.9     07-07    147<H>  |  112<H>  |  33<H>  ----------------------------<  214<H>  3.8   |  25  |  0.93    Ca    7.6<L>      07 Jul 2019 01:53  Phos  3.9     07-07  Mg     2.0     07-07      PT/INR - ( 07 Jul 2019 01:53 )   PT: 15.3 sec;   INR: 1.32 ratio         PTT - ( 07 Jul 2019 01:53 )  PTT:26.5 sec Interval Events:  - Continues to have tachycardia, but now off pressors  - WBC slightly uptrending  - Continuing to diurese: now dose 40 q8 and a dose of metolazone    S: Patient intubated and sedated.    O:   Vital Signs  Vital Signs Last 24 Hrs  T(C): 36.9 (07 Jul 2019 23:00), Max: 37.4 (07 Jul 2019 03:00)  T(F): 98.5 (07 Jul 2019 23:00), Max: 99.3 (07 Jul 2019 03:00)  HR: 104 (08 Jul 2019 01:00) (91 - 121)  BP: 118/57 (08 Jul 2019 01:00) (98/56 - 143/58)  BP(mean): 79 (08 Jul 2019 01:00) (72 - 100)  RR: 24 (08 Jul 2019 01:00) (12 - 25)  SpO2: 99% (08 Jul 2019 01:00) (98% - 100%)      I&O  I&O's Summary    06 Jul 2019 07:01  -  07 Jul 2019 07:00  --------------------------------------------------------  IN: 2425.7 mL / OUT: 3500 mL / NET: -1074.3 mL    07 Jul 2019 07:01  -  08 Jul 2019 02:25  --------------------------------------------------------  IN: 1644.3 mL / OUT: 3435 mL / NET: -1790.7 mL      I&O's Detail    06 Jul 2019 07:01  -  07 Jul 2019 07:00  --------------------------------------------------------  IN:    fat emulsion (Fish Oil and Plant Based) 20% Infusion: 183.7 mL    IV PiggyBack: 150 mL    Solution: 100 mL    TPN (Total Parenteral Nutrition): 1992 mL  Total IN: 2425.7 mL    OUT:    Nasoenteral Tube: 100 mL    Ureteral Catheter: 3050 mL    VAC (Vacuum Assisted Closure) System: 350 mL  Total OUT: 3500 mL    Total NET: -1074.3 mL      07 Jul 2019 07:01  -  08 Jul 2019 02:25  --------------------------------------------------------  IN:    fat emulsion (Fish Oil and Plant Based) 20% Infusion: 150.3 mL    TPN (Total Parenteral Nutrition): 1494 mL  Total IN: 1644.3 mL    OUT:    Nasoenteral Tube: 150 mL    Ureteral Catheter: 3285 mL  Total OUT: 3435 mL    Total NET: -1790.7 mL          General: NAD  Resp: ETT secured in place  CVS: regular rate and rhythm  Abdomen: softly distended, abthera vac in place with good seal, ostomy pink and viable, no gas in bag  Extremities: +edema  Skin: warm, dry, appropriate color                           7.8    21.7  )-----------( 121      ( 07 Jul 2019 01:53 )             22.9     07-07    147<H>  |  112<H>  |  33<H>  ----------------------------<  214<H>  3.8   |  25  |  0.93    Ca    7.6<L>      07 Jul 2019 01:53  Phos  3.9     07-07  Mg     2.0     07-07      PT/INR - ( 07 Jul 2019 01:53 )   PT: 15.3 sec;   INR: 1.32 ratio         PTT - ( 07 Jul 2019 01:53 )  PTT:26.5 sec

## 2019-07-08 NOTE — PROGRESS NOTE ADULT - ASSESSMENT
A/P:74F admitted with abdominal pain and exam findings consistent with peritonitis secondary to acute diverticulitis at the splenic flexure with evidence of contained perforation now POD3 s/p Exploratory laparotomy, left hemicolectomy, end colostomy, and ABThera Vac placement and POD2 from RTOR for abdominal washout and Abthera vac placement, off pressors. TPN started 7/5 for Protein-Calorie Malnutrition    - TPN formula @ goal: Carbohydrates: 210grams, Amino Acid: 120grams, 40g lipids in 2400 ml, Total volume decreased to 2.0L from 2400 ml  - Pt on Ceftriaxone - can not run this in PICC  - Strict Intake and Output.,   - Weights three times a week  - Monitor BMP, Mg+, Ionized Ca++, Phosphorus daily  - Check Triglycerides daily for first 3 days of TPN, then monthly   - Pre-albumin weekly.  - Hyperglycemia-  Increased to from 20U to 30U insulin and will adjust accordingly as Dextrose increased today and based on FS.        Fingersticks to monitor glucose every 6 hours until stable then may be decreased to twice a day, coverage with ISS - to be ordered by primary team  - HyperNa- No NaAce or NaCl in TPN, only NaPhos 30mmol, increased TV to 2400ml originally, however SICU requesting volume be decreased since pt is being diuresed. TV decreased to 2.0l, and SICU advised to use Metolazone, loop diuretic to improve the hypernatremia, and decreased dose of lasix as titrating fluid needs.  - Hypophosphatemia- NaPhos at 30mmol  - Please order Urine lytes  - Continue as per SICU/ Surgery, will follow with you  Please do not hold TPN when pt goes to OR tomorrow for washout.  If TPN stopped, please hang D10.      TPN team, pager [phone], spectra [phone]  D/w Adela Cross

## 2019-07-08 NOTE — PROGRESS NOTE ADULT - ATTENDING COMMENTS
I have seen and evaluated the patient and discussed the relevant clinical findings and plan with the surgical housestaff and fellow.  Agree with the above documentation with addenda as noted.     Clinically stable  Diuresis continues; TPN  Abdomen soft, abthera in place  Colostomy viable but without function    Plan for return to OR tomorrow for washout and possible abdominal closure  Discussed with patient's daughter/HCP    Clark Alvarado MD

## 2019-07-08 NOTE — PROGRESS NOTE ADULT - ASSESSMENT
75 y/o F with PMHx of HFrEF s/p AICD, asthma, HTN, afib on eliquis p/w abd pain s/p ex-lap, left hemicolectomy and end colostomy with abthera placement for perforated diverticulitis on 7/2, abdominal washout x2 (7/3, 7/5).  - Appreciate care by SICU team  - Continue diuresis in preparation for abdominal closure in OR  - Return to OR for abdominal washout and possible closure today     Green [phone] 75 y/o F with PMHx of HFrEF s/p AICD, asthma, HTN, afib on eliquis p/w abd pain s/p ex-lap, left hemicolectomy and end colostomy with abthera placement for perforated diverticulitis on 7/2, abdominal washout x2 (7/3, 7/5).  - Appreciate care by SICU team  - Continue diuresis in preparation for abdominal closure in OR  - Return to OR for abdominal washout and possible closure tomorrow    Cl [phone]

## 2019-07-09 LAB
ANION GAP SERPL CALC-SCNC: 11 MMOL/L — SIGNIFICANT CHANGE UP (ref 5–17)
BUN SERPL-MCNC: 35 MG/DL — HIGH (ref 7–23)
CALCIUM SERPL-MCNC: 8.2 MG/DL — LOW (ref 8.4–10.5)
CHLORIDE SERPL-SCNC: 98 MMOL/L — SIGNIFICANT CHANGE UP (ref 96–108)
CO2 SERPL-SCNC: 31 MMOL/L — SIGNIFICANT CHANGE UP (ref 22–31)
CREAT SERPL-MCNC: 0.76 MG/DL — SIGNIFICANT CHANGE UP (ref 0.5–1.3)
GLUCOSE BLDC GLUCOMTR-MCNC: 106 MG/DL — HIGH (ref 70–99)
GLUCOSE BLDC GLUCOMTR-MCNC: 113 MG/DL — HIGH (ref 70–99)
GLUCOSE BLDC GLUCOMTR-MCNC: 118 MG/DL — HIGH (ref 70–99)
GLUCOSE BLDC GLUCOMTR-MCNC: 125 MG/DL — HIGH (ref 70–99)
GLUCOSE BLDC GLUCOMTR-MCNC: 135 MG/DL — HIGH (ref 70–99)
GLUCOSE BLDC GLUCOMTR-MCNC: 167 MG/DL — HIGH (ref 70–99)
GLUCOSE BLDC GLUCOMTR-MCNC: 172 MG/DL — HIGH (ref 70–99)
GLUCOSE BLDC GLUCOMTR-MCNC: 173 MG/DL — HIGH (ref 70–99)
GLUCOSE BLDC GLUCOMTR-MCNC: 175 MG/DL — HIGH (ref 70–99)
GLUCOSE BLDC GLUCOMTR-MCNC: 185 MG/DL — HIGH (ref 70–99)
GLUCOSE BLDC GLUCOMTR-MCNC: 189 MG/DL — HIGH (ref 70–99)
GLUCOSE BLDC GLUCOMTR-MCNC: 197 MG/DL — HIGH (ref 70–99)
GLUCOSE BLDC GLUCOMTR-MCNC: 199 MG/DL — HIGH (ref 70–99)
GLUCOSE BLDC GLUCOMTR-MCNC: 205 MG/DL — HIGH (ref 70–99)
GLUCOSE BLDC GLUCOMTR-MCNC: 211 MG/DL — HIGH (ref 70–99)
GLUCOSE BLDC GLUCOMTR-MCNC: 225 MG/DL — HIGH (ref 70–99)
GLUCOSE BLDC GLUCOMTR-MCNC: 234 MG/DL — HIGH (ref 70–99)
GLUCOSE BLDC GLUCOMTR-MCNC: 252 MG/DL — HIGH (ref 70–99)
GLUCOSE BLDC GLUCOMTR-MCNC: 260 MG/DL — HIGH (ref 70–99)
GLUCOSE BLDC GLUCOMTR-MCNC: 95 MG/DL — SIGNIFICANT CHANGE UP (ref 70–99)
GLUCOSE SERPL-MCNC: 230 MG/DL — HIGH (ref 70–99)
GRAM STN FLD: SIGNIFICANT CHANGE UP
GRAM STN FLD: SIGNIFICANT CHANGE UP
HCT VFR BLD CALC: 23.5 % — LOW (ref 34.5–45)
HGB BLD-MCNC: 8.2 G/DL — LOW (ref 11.5–15.5)
MAGNESIUM SERPL-MCNC: 1.9 MG/DL — SIGNIFICANT CHANGE UP (ref 1.6–2.6)
MCHC RBC-ENTMCNC: 32.6 PG — SIGNIFICANT CHANGE UP (ref 27–34)
MCHC RBC-ENTMCNC: 34.6 GM/DL — SIGNIFICANT CHANGE UP (ref 32–36)
MCV RBC AUTO: 94 FL — SIGNIFICANT CHANGE UP (ref 80–100)
PHOSPHATE SERPL-MCNC: 4.5 MG/DL — SIGNIFICANT CHANGE UP (ref 2.5–4.5)
PLATELET # BLD AUTO: 137 K/UL — LOW (ref 150–400)
POTASSIUM SERPL-MCNC: 3.6 MMOL/L — SIGNIFICANT CHANGE UP (ref 3.5–5.3)
POTASSIUM SERPL-SCNC: 3.6 MMOL/L — SIGNIFICANT CHANGE UP (ref 3.5–5.3)
RBC # BLD: 2.5 M/UL — LOW (ref 3.8–5.2)
RBC # FLD: 14.4 % — SIGNIFICANT CHANGE UP (ref 10.3–14.5)
SODIUM SERPL-SCNC: 140 MMOL/L — SIGNIFICANT CHANGE UP (ref 135–145)
SPECIMEN SOURCE: SIGNIFICANT CHANGE UP
SPECIMEN SOURCE: SIGNIFICANT CHANGE UP
WBC # BLD: 27.5 K/UL — HIGH (ref 3.8–10.5)
WBC # FLD AUTO: 27.5 K/UL — HIGH (ref 3.8–10.5)

## 2019-07-09 PROCEDURE — 99232 SBSQ HOSP IP/OBS MODERATE 35: CPT

## 2019-07-09 PROCEDURE — 71045 X-RAY EXAM CHEST 1 VIEW: CPT | Mod: 26

## 2019-07-09 RX ORDER — FUROSEMIDE 40 MG
40 TABLET ORAL ONCE
Refills: 0 | Status: COMPLETED | OUTPATIENT
Start: 2019-07-09 | End: 2019-07-09

## 2019-07-09 RX ORDER — CHLORHEXIDINE GLUCONATE 213 G/1000ML
1 SOLUTION TOPICAL DAILY
Refills: 0 | Status: DISCONTINUED | OUTPATIENT
Start: 2019-07-09 | End: 2019-08-02

## 2019-07-09 RX ORDER — INSULIN HUMAN 100 [IU]/ML
3 INJECTION, SOLUTION SUBCUTANEOUS
Qty: 100 | Refills: 0 | Status: DISCONTINUED | OUTPATIENT
Start: 2019-07-09 | End: 2019-07-09

## 2019-07-09 RX ORDER — IPRATROPIUM/ALBUTEROL SULFATE 18-103MCG
3 AEROSOL WITH ADAPTER (GRAM) INHALATION EVERY 6 HOURS
Refills: 0 | Status: DISCONTINUED | OUTPATIENT
Start: 2019-07-09 | End: 2019-08-02

## 2019-07-09 RX ORDER — INSULIN LISPRO 100/ML
VIAL (ML) SUBCUTANEOUS EVERY 4 HOURS
Refills: 0 | Status: DISCONTINUED | OUTPATIENT
Start: 2019-07-09 | End: 2019-07-09

## 2019-07-09 RX ORDER — DEXTROSE 50 % IN WATER 50 %
25 SYRINGE (ML) INTRAVENOUS
Refills: 0 | Status: DISCONTINUED | OUTPATIENT
Start: 2019-07-09 | End: 2019-07-09

## 2019-07-09 RX ORDER — POTASSIUM CHLORIDE 20 MEQ
20 PACKET (EA) ORAL EVERY 8 HOURS
Refills: 0 | Status: DISCONTINUED | OUTPATIENT
Start: 2019-07-09 | End: 2019-07-09

## 2019-07-09 RX ORDER — FUROSEMIDE 40 MG
40 TABLET ORAL EVERY 8 HOURS
Refills: 0 | Status: DISCONTINUED | OUTPATIENT
Start: 2019-07-09 | End: 2019-07-09

## 2019-07-09 RX ORDER — IMIPENEM AND CILASTATIN 250; 250 MG/100ML; MG/100ML
500 INJECTION, POWDER, FOR SOLUTION INTRAVENOUS EVERY 6 HOURS
Refills: 0 | Status: COMPLETED | OUTPATIENT
Start: 2019-07-09 | End: 2019-07-16

## 2019-07-09 RX ORDER — MAGNESIUM SULFATE 500 MG/ML
2 VIAL (ML) INJECTION ONCE
Refills: 0 | Status: COMPLETED | OUTPATIENT
Start: 2019-07-09 | End: 2019-07-09

## 2019-07-09 RX ORDER — ACETAMINOPHEN 500 MG
975 TABLET ORAL EVERY 6 HOURS
Refills: 0 | Status: DISCONTINUED | OUTPATIENT
Start: 2019-07-09 | End: 2019-07-10

## 2019-07-09 RX ORDER — ELECTROLYTE SOLUTION,INJ
1 VIAL (ML) INTRAVENOUS
Refills: 0 | Status: DISCONTINUED | OUTPATIENT
Start: 2019-07-09 | End: 2019-07-09

## 2019-07-09 RX ORDER — POTASSIUM CHLORIDE 20 MEQ
20 PACKET (EA) ORAL EVERY 8 HOURS
Refills: 0 | Status: COMPLETED | OUTPATIENT
Start: 2019-07-09 | End: 2019-07-09

## 2019-07-09 RX ORDER — METRONIDAZOLE 500 MG
500 TABLET ORAL EVERY 8 HOURS
Refills: 0 | Status: DISCONTINUED | OUTPATIENT
Start: 2019-07-09 | End: 2019-07-09

## 2019-07-09 RX ORDER — FUROSEMIDE 40 MG
40 TABLET ORAL EVERY 8 HOURS
Refills: 0 | Status: COMPLETED | OUTPATIENT
Start: 2019-07-09 | End: 2019-07-09

## 2019-07-09 RX ORDER — ENOXAPARIN SODIUM 100 MG/ML
40 INJECTION SUBCUTANEOUS DAILY
Refills: 0 | Status: DISCONTINUED | OUTPATIENT
Start: 2019-07-09 | End: 2019-07-15

## 2019-07-09 RX ORDER — I.V. FAT EMULSION 20 G/100ML
16.7 EMULSION INTRAVENOUS
Qty: 40 | Refills: 0 | Status: DISCONTINUED | OUTPATIENT
Start: 2019-07-09 | End: 2019-07-10

## 2019-07-09 RX ORDER — IRON SUCROSE 20 MG/ML
200 INJECTION, SOLUTION INTRAVENOUS EVERY 24 HOURS
Refills: 0 | Status: COMPLETED | OUTPATIENT
Start: 2019-07-09 | End: 2019-07-12

## 2019-07-09 RX ORDER — POTASSIUM CHLORIDE 20 MEQ
10 PACKET (EA) ORAL
Refills: 0 | Status: COMPLETED | OUTPATIENT
Start: 2019-07-09 | End: 2019-07-09

## 2019-07-09 RX ORDER — BUDESONIDE, MICRONIZED 100 %
0.5 POWDER (GRAM) MISCELLANEOUS EVERY 12 HOURS
Refills: 0 | Status: DISCONTINUED | OUTPATIENT
Start: 2019-07-09 | End: 2019-08-02

## 2019-07-09 RX ORDER — POTASSIUM CHLORIDE 20 MEQ
20 PACKET (EA) ORAL
Refills: 0 | Status: COMPLETED | OUTPATIENT
Start: 2019-07-09 | End: 2019-07-09

## 2019-07-09 RX ORDER — METOPROLOL TARTRATE 50 MG
2.5 TABLET ORAL EVERY 12 HOURS
Refills: 0 | Status: DISCONTINUED | OUTPATIENT
Start: 2019-07-09 | End: 2019-07-09

## 2019-07-09 RX ORDER — INSULIN LISPRO 100/ML
VIAL (ML) SUBCUTANEOUS EVERY 4 HOURS
Refills: 0 | Status: DISCONTINUED | OUTPATIENT
Start: 2019-07-09 | End: 2019-07-10

## 2019-07-09 RX ORDER — CHLORHEXIDINE GLUCONATE 213 G/1000ML
15 SOLUTION TOPICAL
Refills: 0 | Status: DISCONTINUED | OUTPATIENT
Start: 2019-07-09 | End: 2019-07-12

## 2019-07-09 RX ORDER — METOPROLOL TARTRATE 50 MG
2.5 TABLET ORAL EVERY 6 HOURS
Refills: 0 | Status: DISCONTINUED | OUTPATIENT
Start: 2019-07-09 | End: 2019-07-12

## 2019-07-09 RX ORDER — HYDROMORPHONE HYDROCHLORIDE 2 MG/ML
0.5 INJECTION INTRAMUSCULAR; INTRAVENOUS; SUBCUTANEOUS
Refills: 0 | Status: DISCONTINUED | OUTPATIENT
Start: 2019-07-09 | End: 2019-07-13

## 2019-07-09 RX ORDER — PANTOPRAZOLE SODIUM 20 MG/1
40 TABLET, DELAYED RELEASE ORAL EVERY 24 HOURS
Refills: 0 | Status: DISCONTINUED | OUTPATIENT
Start: 2019-07-09 | End: 2019-07-18

## 2019-07-09 RX ADMIN — HYDROMORPHONE HYDROCHLORIDE 0.5 MILLIGRAM(S): 2 INJECTION INTRAMUSCULAR; INTRAVENOUS; SUBCUTANEOUS at 17:10

## 2019-07-09 RX ADMIN — Medication 20 MILLIEQUIVALENT(S): at 02:05

## 2019-07-09 RX ADMIN — Medication 975 MILLIGRAM(S): at 00:37

## 2019-07-09 RX ADMIN — Medication 975 MILLIGRAM(S): at 00:07

## 2019-07-09 RX ADMIN — Medication 40 MILLIGRAM(S): at 14:55

## 2019-07-09 RX ADMIN — Medication 3 MILLILITER(S): at 05:54

## 2019-07-09 RX ADMIN — Medication 40 MILLIGRAM(S): at 06:48

## 2019-07-09 RX ADMIN — HYDROMORPHONE HYDROCHLORIDE 0.5 MILLIGRAM(S): 2 INJECTION INTRAMUSCULAR; INTRAVENOUS; SUBCUTANEOUS at 04:54

## 2019-07-09 RX ADMIN — HYDROMORPHONE HYDROCHLORIDE 0.5 MILLIGRAM(S): 2 INJECTION INTRAMUSCULAR; INTRAVENOUS; SUBCUTANEOUS at 16:55

## 2019-07-09 RX ADMIN — Medication 975 MILLIGRAM(S): at 06:34

## 2019-07-09 RX ADMIN — INSULIN HUMAN 3 UNIT(S)/HR: 100 INJECTION, SOLUTION SUBCUTANEOUS at 18:28

## 2019-07-09 RX ADMIN — I.V. FAT EMULSION 16.7 ML/HR: 20 EMULSION INTRAVENOUS at 19:31

## 2019-07-09 RX ADMIN — Medication 50 GRAM(S): at 08:46

## 2019-07-09 RX ADMIN — HYDROMORPHONE HYDROCHLORIDE 0.5 MILLIGRAM(S): 2 INJECTION INTRAMUSCULAR; INTRAVENOUS; SUBCUTANEOUS at 23:18

## 2019-07-09 RX ADMIN — Medication 100 MILLIGRAM(S): at 00:08

## 2019-07-09 RX ADMIN — Medication 3 MILLILITER(S): at 17:54

## 2019-07-09 RX ADMIN — Medication 50 MILLIEQUIVALENT(S): at 08:18

## 2019-07-09 RX ADMIN — Medication 20 MILLIEQUIVALENT(S): at 04:08

## 2019-07-09 RX ADMIN — Medication 40 MILLIGRAM(S): at 00:07

## 2019-07-09 RX ADMIN — CHLORHEXIDINE GLUCONATE 15 MILLILITER(S): 213 SOLUTION TOPICAL at 22:06

## 2019-07-09 RX ADMIN — Medication 2.5 MILLIGRAM(S): at 06:48

## 2019-07-09 RX ADMIN — Medication 2.5 MILLIGRAM(S): at 22:05

## 2019-07-09 RX ADMIN — PANTOPRAZOLE SODIUM 40 MILLIGRAM(S): 20 TABLET, DELAYED RELEASE ORAL at 06:48

## 2019-07-09 RX ADMIN — INSULIN HUMAN 3 UNIT(S)/HR: 100 INJECTION, SOLUTION SUBCUTANEOUS at 19:27

## 2019-07-09 RX ADMIN — Medication 975 MILLIGRAM(S): at 18:15

## 2019-07-09 RX ADMIN — Medication 2.5 MILLIGRAM(S): at 17:12

## 2019-07-09 RX ADMIN — Medication 1 EACH: at 17:57

## 2019-07-09 RX ADMIN — IRON SUCROSE 110 MILLIGRAM(S): 20 INJECTION, SOLUTION INTRAVENOUS at 18:01

## 2019-07-09 RX ADMIN — Medication 20 MILLIEQUIVALENT(S): at 22:31

## 2019-07-09 RX ADMIN — HYDROMORPHONE HYDROCHLORIDE 0.5 MILLIGRAM(S): 2 INJECTION INTRAMUSCULAR; INTRAVENOUS; SUBCUTANEOUS at 23:33

## 2019-07-09 RX ADMIN — Medication 3 MILLILITER(S): at 23:34

## 2019-07-09 RX ADMIN — Medication 975 MILLIGRAM(S): at 00:00

## 2019-07-09 RX ADMIN — Medication 0.5 MILLIGRAM(S): at 18:36

## 2019-07-09 RX ADMIN — Medication 100 MILLIEQUIVALENT(S): at 22:31

## 2019-07-09 RX ADMIN — Medication 20 MILLIEQUIVALENT(S): at 14:56

## 2019-07-09 RX ADMIN — Medication 3 MILLILITER(S): at 00:02

## 2019-07-09 RX ADMIN — HYDROMORPHONE HYDROCHLORIDE 0.5 MILLIGRAM(S): 2 INJECTION INTRAMUSCULAR; INTRAVENOUS; SUBCUTANEOUS at 20:00

## 2019-07-09 RX ADMIN — CEFTRIAXONE 100 MILLIGRAM(S): 500 INJECTION, POWDER, FOR SOLUTION INTRAMUSCULAR; INTRAVENOUS at 08:11

## 2019-07-09 RX ADMIN — Medication 100 MILLIEQUIVALENT(S): at 21:08

## 2019-07-09 RX ADMIN — Medication 0.5 MILLIGRAM(S): at 05:54

## 2019-07-09 RX ADMIN — I.V. FAT EMULSION 16.7 ML/HR: 20 EMULSION INTRAVENOUS at 18:29

## 2019-07-09 RX ADMIN — Medication 975 MILLIGRAM(S): at 17:14

## 2019-07-09 RX ADMIN — CHLORHEXIDINE GLUCONATE 15 MILLILITER(S): 213 SOLUTION TOPICAL at 06:34

## 2019-07-09 RX ADMIN — Medication 1 EACH: at 19:27

## 2019-07-09 RX ADMIN — Medication 100 MILLIGRAM(S): at 16:35

## 2019-07-09 RX ADMIN — HYDROMORPHONE HYDROCHLORIDE 0.5 MILLIGRAM(S): 2 INJECTION INTRAMUSCULAR; INTRAVENOUS; SUBCUTANEOUS at 20:15

## 2019-07-09 RX ADMIN — Medication 20 MILLIEQUIVALENT(S): at 00:07

## 2019-07-09 RX ADMIN — HYDROMORPHONE HYDROCHLORIDE 0.5 MILLIGRAM(S): 2 INJECTION INTRAMUSCULAR; INTRAVENOUS; SUBCUTANEOUS at 04:39

## 2019-07-09 RX ADMIN — Medication 100 MILLIGRAM(S): at 08:18

## 2019-07-09 NOTE — PROGRESS NOTE ADULT - ATTENDING COMMENTS
I have seen and evaluated the patient and discussed the relevant clinical findings and plan with the surgical housestaff and fellow.  Agree with the above documentation with addenda as noted.     OR today for washout, possible closure      Clark Alvarado MD

## 2019-07-09 NOTE — PROGRESS NOTE ADULT - SUBJECTIVE AND OBJECTIVE BOX
[facility] NUTRITION SUPPORT / TPN -- FOLLOW UP NOTE  --------------------------------------------------------------------------------    24 hour events/subjective:  - Extubated  - C/w Lasix/ Metolazone q8h w/ net -2.5L  - Started on Ins drip for persistent hyperglycemia, better mngt  - For RTOR for Ex Lap, Washout w/ Surgery team  - Daughter at bedside.  All questions asked and answered to family's satisfaction.       Diet:  NPO    Appetite: [x  ]Poor [  ]Adequate [  ]Good  Caloric intake:  [ x  ]  Adequate   [   ] Inadequate    ROS:pt unable to offer    ALLERGIES & MEDICATIONS  --------------------------------------------------------------------------------  ALLERGIES  Coreg (Other)  digoxin (Other; Short breath (Mild to Mod))  Lopressor (Short breath)  penicillins (Hives)    INTOLERANCES  metoprolol (Other)  Solu-Medrol (Other (Mild to Mod))    STANDING INPATIENT MEDICATIONS    fat emulsion (Fish Oil and Plant Based) 20% Infusion 16.7 mL/Hr IV Continuous <Continuous>  Parenteral Nutrition - Adult 1 Each TPN Continuous <Continuous>  Parenteral Nutrition - Adult 1 Each TPN Continuous <Continuous>  potassium chloride  20 mEq/100 mL IVPB 20 milliEquivalent(s) IV Intermittent every 2 hours        VITALS/PHYSICAL EXAM  --------------------------------------------------------------------------------  T(C): 37.3 (07-09-19 @ 11:20), Max: 37.3 (07-09-19 @ 08:00)  HR: 98 (07-09-19 @ 11:20) (86 - 109)  BP: 116/56 (07-09-19 @ 11:20) (92/52 - 168/68)  RR: 22 (07-09-19 @ 11:20) (20 - 30)  SpO2: 99% (07-09-19 @ 11:20) (97% - 100%)  Wt(kg): --  Height (cm): 160.02 (07-09-19 @ 11:20)  Weight (kg): 83.2 (07-09-19 @ 11:20)  BMI (kg/m2): 32.5 (07-09-19 @ 11:20)  BSA (m2): 1.86 (07-09-19 @ 11:20)      07-08-19 @ 07:01  -  07-09-19 @ 07:00  --------------------------------------------------------  IN: 3007.1 mL / OUT: 6335 mL / NET: -3327.9 mL    07-09-19 @ 07:01  -  07-09-19 @ 14:01  --------------------------------------------------------  IN: 249 mL / OUT: 940 mL / NET: -691 mL    Physical Exam:    	Gen: Guarded, but stable, WN/ WD/ WG, lethargic  	HEENT: NC/AT, PERRL, mucosa moist, supple neck, clear oropharynx  	Chest: equal chest exp b/l, non labored breathing  	GI: +softly distended, nontender, no BS                  (+)Ostomy pink & viable, (+)effulence noted                  ABthera intact w/ good seal              :  (+)Ching              MSK/Vascular: Passive ROM x4,  no clubbing, cyanosis, nor edema  	Neuro: unable to assess, attempts to open eyes when call her name  	Skin: Warm, good turgor, without rashes        LABS/ CULTURES/ RADIOLOGY:              8.2    27.5  >-----------<  137      [07-09-19 @ 04:50]              23.5     140  |  98  |  35  ----------------------------<  230      [07-09-19 @ 04:50]  3.6   |  31  |  0.76        Ca     8.2     [07-09-19 @ 04:50]      Mg     1.9     [07-09-19 @ 04:50]      Phos  4.5     [07-09-19 @ 04:50]      PT/INR: PT 15.7 , INR 1.36       [07-08-19 @ 02:35]  PTT: 28.3       [07-08-19 @ 02:35]      Blood Gas Calcium, Ionized - Venous: 1.19 mmoL/L (07-08-19 @ 02:25)    Prealbumin, Serum: 5 mg/dL (07-04-19 @ 22:45)    Triglycerides, Serum: 119 mg/dL (07.08.19 @ 04:24)    CAPILLARY BLOOD GLUCOSE    POCT Blood Glucose.: 135 mg/dL (09 Jul 2019 10:29)  POCT Blood Glucose.: 189 mg/dL (09 Jul 2019 08:28)  POCT Blood Glucose.: 197 mg/dL (09 Jul 2019 08:07)  POCT Blood Glucose.: 252 mg/dL (09 Jul 2019 07:08)  POCT Blood Glucose.: 234 mg/dL (09 Jul 2019 06:25)  POCT Blood Glucose.: 199 mg/dL (09 Jul 2019 05:05)  POCT Blood Glucose.: 205 mg/dL (09 Jul 2019 04:08)  POCT Blood Glucose.: 211 mg/dL (09 Jul 2019 03:00)  POCT Blood Glucose.: 175 mg/dL (09 Jul 2019 02:07)  POCT Blood Glucose.: 173 mg/dL (09 Jul 2019 01:01)  POCT Blood Glucose.: 167 mg/dL (09 Jul 2019 00:05)  POCT Blood Glucose.: 148 mg/dL (08 Jul 2019 23:01)  POCT Blood Glucose.: 182 mg/dL (08 Jul 2019 22:03)  POCT Blood Glucose.: 184 mg/dL (08 Jul 2019 21:00)  POCT Blood Glucose.: 189 mg/dL (08 Jul 2019 20:07)  POCT Blood Glucose.: 253 mg/dL (08 Jul 2019 18:47)  POCT Blood Glucose.: 249 mg/dL (08 Jul 2019 17:58)  POCT Blood Glucose.: 254 mg/dL (08 Jul 2019 16:59)

## 2019-07-09 NOTE — PROGRESS NOTE ADULT - SUBJECTIVE AND OBJECTIVE BOX
Interval Events: Extubated, Lasiz/Metolazone continued q8h w/net -2.5L, started on insulin drip    S: Patient doing well, denies fevers, chills, nausea, emesis, chest pain, SOB.    O: Vital Signs  T(C): 36.4 (07-09 @ 03:00), Max: 37.4 (07-08 @ 08:00)  HR: 102 (07-09 @ 04:00) (97 - 112)  BP: 125/59 (07-09 @ 04:00) (113/59 - 168/68)  RR: 22 (07-09 @ 04:00) (22 - 30)  SpO2: 100% (07-09 @ 04:00) (98% - 100%)  07-07-19 @ 07:01  -  07-08-19 @ 07:00  --------------------------------------------------------  IN: 2392.4 mL / OUT: 4270 mL / NET: -1877.6 mL    07-08-19 @ 07:01  -  07-09-19 @ 05:11  --------------------------------------------------------  IN: 2691.1 mL / OUT: 5185 mL / NET: -2493.9 mL      General: alert and oriented, NAD  Resp: airway patent, respirations unlabored  CVS: regular rate and rhythm  Abdomen: soft, nontender, nondistended, stoma pink and viable, abthera vac in place with good suction  Extremities: no edema  Skin: warm, dry, appropriate color                          8.2    27.5  )-----------( 137      ( 09 Jul 2019 04:50 )             23.5   07-08    143  |  102  |  35<H>  ----------------------------<  170<H>  3.2<L>   |  32<H>  |  0.81    Ca    8.1<L>      08 Jul 2019 22:14  Phos  4.1     07-08  Mg     1.8     07-08

## 2019-07-09 NOTE — PRE-ANESTHESIA EVALUATION ADULT - NS MD HP INPLANTS MED DEV
Automatic Implantable Cardioverter Defibrillator/Pacemaker
Automatic Implantable Cardioverter Defibrillator/Pacemaker
Pacemaker/Automatic Implantable Cardioverter Defibrillator
Pacemaker/Automatic Implantable Cardioverter Defibrillator

## 2019-07-09 NOTE — PROGRESS NOTE ADULT - ASSESSMENT
ASSESSMENT  74F admitted with abdominal pain and exam findings consistent with peritonitis secondary to acute diverticulitis at the splenic flexure with evidence of contained perforation now POD3 s/p Exploratory laparotomy, left hemicolectomy, end colostomy, and ABThera Vac placement and POD4 and POD2 from RTOR for abdominal washout and abthera vac placement, off pressors.     PLAN:  NEURO: acute pain control  - Pain control with Tylenol / dilaudid PRN     RESPIRATORY: extubated, PMHx of Asthma  - F/u AM chest Xray    CARDIOVASCULAR:  off pressors  - C/w diuresis Lasix/Metolazone    - metoprolol 2.5 q12h  - Hold home anti-hypertensives  - Trend lactate    GI/NUTRITION: s/p ex lap and L hemicolectomy with end colostomy and ABThera VAC placement and washout  - NPO, IVF, NGT   - continue TPN - maximum volume of 2L per day  - monitor for ostomy function - no fxn yet   - OR today for possible closure     GENITOURINARY/RENAL: DIANNA on CKD stage 3  - Renally dose antibiotics  - continue diuresis - 40 lasix q8h, had one dose of metolazone    HEMATOLOGIC: H/H stable  - Patient does NOT accept blood products  - Started on Iron  - Minimize blood draws   - Lovenox for DVT ppx  - Hold ASA and A/C    INFECTIOUS DISEASE: Colonic perforation, cultures + for E.coli  - Ceftriaxone and Flagyl     ENDOCRINE:  - on insulin drip for hyperglycemia     SICU [id number] ASSESSMENT  74F admitted with abdominal pain and exam findings consistent with peritonitis secondary to acute diverticulitis at the splenic flexure with evidence of contained perforation now POD3 s/p Exploratory laparotomy, left hemicolectomy, end colostomy, and ABThera Vac placement and POD4 and POD2 from RTOR for abdominal washout and abthera vac placement, off pressors.     PLAN:  NEURO: acute pain control  - Pain control with Tylenol / dilaudid PRN   - well controlled    RESPIRATORY: extubated, PMHx of Asthma  - AM chest Xray: clear except rt lower atelectasis  - no gas this AM to avoid phlebotomy  - on duoneb and pulmicort    CARDIOVASCULAR:  off pressors  - C/w diuresis Lasix/Metolazone    - metoprolol 2.5 q12h  - Hold home anti-hypertensives  - AM lasix extra dose  - well perfused,   - neg 3.3 lit for 24hrs, 4.5 liters of urine output, NG 1 liter    GI/NUTRITION: s/p ex lap and L hemicolectomy with end colostomy and ABThera VAC placement and washout  - NPO, IVF, NGT   - continue TPN - maximum volume of 2L per day  - monitor for ostomy function - no fxn yet   - OR today for possible closure     GENITOURINARY/RENAL: DIANNA on CKD stage 3  - continue diuresis - 40 lasix q8h, had one dose of metolazone  - BUN Cr stable and at baseline  - Mg 1.9 repleted    HEMATOLOGIC: H/H stable  - Patient does NOT accept blood products  - Started on Iron IV x 5days  - Minimize blood draws   - Lovenox for DVT ppx  - Hold ASA and A/C    INFECTIOUS DISEASE: Colonic perforation, cultures + for E.coli  - Ceftriaxone and Flagyl   - wbc 20, variable but not ascending  - afebrile  - e coli from OR pan sensitive  - to determine the course of AB therapy based on OR findings today    ENDOCRINE:  - on insulin drip for hyperglycemia   - insulin was infused briefly through infiltrated IV, since using other IV, better response    MSK:  - PT did not work with her, will attempt today      Lines:  NG, starr, VAC, PIV, PICC      SICU [id number]

## 2019-07-09 NOTE — PRE-ANESTHESIA EVALUATION ADULT - NSANTHOSAYNRD_GEN_A_CORE
No. MARK screening performed.  STOP BANG Legend: 0-2 = LOW Risk; 3-4 = INTERMEDIATE Risk; 5-8 = HIGH Risk

## 2019-07-09 NOTE — PROGRESS NOTE ADULT - SUBJECTIVE AND OBJECTIVE BOX
HISTORY  74y Female PMHx of HFrEF s/p AICD, asthma, HTN, HLD, atrial fibrillation on eliquis p/w severe, sudden onset L sided abd pain which started yesterday morning after eating. She endorsed nausea at the time but no vomiting. She did have any fevers, chills, diarrhea or hematochezia.      On 6/30, CTAP w/ IV contrast showed proximal descending colon/splenic flexure diverticulitis with small amount of free fluid and 3cm low attenuation structure in the right adnexa ?fluid collection ?ovarian cyst. She developed dyspnea and wheezing after the CT scan and became hypoxic. Bedside US int he ED demonstrated a few B lines. She received 80mg total of IV lasix, 125 of solumedrol, duonebs x2 and narcan and she was admitted to MICU for management of respiratory and heart failure.     Through the next day (7/1), she was persistently tachycardic with BPs as low as 76/50, MAPs in low 60s. She was bolused with 500cc of crystalloid without improvement in blood pressure. She was then started on phenylephrine. Her lactate was uptrending from 2 to 2.6 to 3 on the arterial blood gas. A repeat CT scan showed "Acute diverticulitis at the splenic flexure with evidence of contained perforation. No drainable regional collection. Increased ascites in the deep pelvis." She was transferred to the SICU for further HD monitoring. Antibiotics were switched to Aztreonam and flagyl. Bedside ultrasound showed IV 1.5 minimally collapsable and echo consistent with euvolemic state. Antibiotics again switched to Ceftriaxone and flagyl. OGT switched to NGT. Patient became tachypneic during SBT on 7/6 so she could not be extubated at that time. She was kept intubated for a plan to return to the OR on 7/8.    24 HOUR EVENTS:  - Extubated  - C/w Lasix/ Metolazone q8h w/ net -2.5L  - Started on Ins drip for persistent hyperglycemia     SUBJECTIVE/ROS:  [ ] A ten-point review of systems was otherwise negative except as noted.  [ ] Due to altered mental status/intubation, subjective information were not able to be obtained from the patient. History was obtained, to the extent possible, from review of the chart and collateral sources of information.      NEURO  RASS:     GCS:     CAM ICU:  Exam: awake, alert, oriented  Meds: acetaminophen    Suspension .. 975 milliGRAM(s) Oral every 6 hours  HYDROmorphone  Injectable 0.5 milliGRAM(s) IV Push every 3 hours PRN pain    [x] Adequacy of sedation and pain control has been assessed and adjusted      RESPIRATORY  RR: 22 (07-09-19 @ 00:00) (19 - 30)  SpO2: 100% (07-09-19 @ 00:04) (98% - 100%)  Wt(kg): --  Exam: unlabored, clear to auscultation bilaterally  Mechanical Ventilation: Mode: CPAP with PS, RR (patient): 21, FiO2: 40, PEEP: 5, PS: 5, ITime: 0.9, MAP: 7, PC: 72    [N/A] Extubation Readiness Assessed  Meds: ALBUTerol/ipratropium for Nebulization 3 milliLiter(s) Nebulizer every 6 hours  buDESOnide   0.5 milliGRAM(s) Respule 0.5 milliGRAM(s) Inhalation every 12 hours        CARDIOVASCULAR  HR: 100 (07-09-19 @ 00:04) (97 - 126)  BP: 114/57 (07-09-19 @ 00:00) (113/59 - 168/68)  BP(mean): 77 (07-09-19 @ 00:00) (77 - 98)  VBG - ( 08 Jul 2019 02:25 )  pH: 7.43  /  pCO2: 46    /  pO2: 39    / HCO3: 30    / Base Excess: 5.7   /  SaO2: 70     Lactate: 1.8                Exam: regular rate and rhythm  Cardiac Rhythm: sinus  Perfusion     [x]Adequate   [ ]Inadequate  Mentation   [x]Normal       [ ]Reduced  Extremities  [x]Warm         [ ]Cool  Volume Status [ ]Hypervolemic [x]Euvolemic [ ]Hypovolemic  Meds: metoprolol tartrate Injectable 2.5 milliGRAM(s) IV Push every 12 hours        GI/NUTRITION  Exam: soft, nontender, nondistended, abthera w/ good suction   Diet: NPO w/ TPN   Meds: pantoprazole  Injectable 40 milliGRAM(s) IV Push every 24 hours      GENITOURINARY  I&O's Detail    07-07 @ 07:01  -  07-08 @ 07:00  --------------------------------------------------------  IN:    fat emulsion (Fish Oil and Plant Based) 20% Infusion: 200.4 mL    IV PiggyBack: 50 mL    Solution: 50 mL    Solution: 100 mL    TPN (Total Parenteral Nutrition): 1992 mL  Total IN: 2392.4 mL    OUT:    Colostomy: 10 mL    Nasoenteral Tube: 200 mL    Ureteral Catheter: 4060 mL  Total OUT: 4270 mL    Total NET: -1877.6 mL      07-08 @ 07:01  -  07-09 @ 00:33  --------------------------------------------------------  IN:    Enteral Tube Flush: 300 mL    fat emulsion (Fish Oil and Plant Based) 20% Infusion: 133.6 mL    insulin regular Infusion: 42 mL    IV PiggyBack: 150 mL    TPN (Total Parenteral Nutrition): 1411 mL  Total IN: 2036.6 mL    OUT:    Nasoenteral Tube: 250 mL    Ureteral Catheter: 3410 mL    VAC (Vacuum Assisted Closure) System: 500 mL  Total OUT: 4160 mL    Total NET: -2123.4 mL          07-08    143  |  102  |  35<H>  ----------------------------<  170<H>  3.2<L>   |  32<H>  |  0.81    Ca    8.1<L>      08 Jul 2019 22:14  Phos  4.1     07-08  Mg     1.8     07-08      [ ] Ching catheter, indication: N/A  Meds: fat emulsion (Fish Oil and Plant Based) 20% Infusion 16.7 mL/Hr IV Continuous <Continuous>  iron sucrose IVPB 200 milliGRAM(s) IV Intermittent every 24 hours  Parenteral Nutrition - Adult 1 Each TPN Continuous <Continuous>  potassium chloride   Solution 20 milliEquivalent(s) Enteral Tube every 2 hours        HEMATOLOGIC  Meds: enoxaparin Injectable 40 milliGRAM(s) SubCutaneous daily    [x] VTE Prophylaxis                        x      23.0  )-----------( x        ( 08 Jul 2019 02:36 )             x        PT/INR - ( 08 Jul 2019 02:35 )   PT: 15.7 sec;   INR: 1.36 ratio         PTT - ( 08 Jul 2019 02:35 )  PTT:28.3 sec  Transfusion     [ ] PRBC   [ ] Platelets   [ ] FFP   [ ] Cryoprecipitate      INFECTIOUS DISEASES  WBC Count: 23.0 K/uL (07-08 @ 02:36)    RECENT CULTURES:    Meds: cefTRIAXone   IVPB 1000 milliGRAM(s) IV Intermittent every 24 hours  metroNIDAZOLE  IVPB 500 milliGRAM(s) IV Intermittent every 8 hours        ENDOCRINE  CAPILLARY BLOOD GLUCOSE      POCT Blood Glucose.: 167 mg/dL (09 Jul 2019 00:05)  POCT Blood Glucose.: 148 mg/dL (08 Jul 2019 23:01)  POCT Blood Glucose.: 182 mg/dL (08 Jul 2019 22:03)  POCT Blood Glucose.: 184 mg/dL (08 Jul 2019 21:00)  POCT Blood Glucose.: 189 mg/dL (08 Jul 2019 20:07)  POCT Blood Glucose.: 253 mg/dL (08 Jul 2019 18:47)  POCT Blood Glucose.: 249 mg/dL (08 Jul 2019 17:58)  POCT Blood Glucose.: 254 mg/dL (08 Jul 2019 16:59)  POCT Blood Glucose.: 292 mg/dL (08 Jul 2019 13:51)  POCT Blood Glucose.: 325 mg/dL (08 Jul 2019 13:49)  POCT Blood Glucose.: 251 mg/dL (08 Jul 2019 10:26)  POCT Blood Glucose.: 209 mg/dL (08 Jul 2019 05:05)  POCT Blood Glucose.: 239 mg/dL (08 Jul 2019 01:13)    Meds: insulin regular Infusion 3 Unit(s)/Hr IV Continuous <Continuous>        ACCESS DEVICES:  [ ] Peripheral IV  [ ] Central Venous Line	[ ] R	[ ] L	[ ] IJ	[ ] Fem	[ ] SC	Placed:   [ ] Arterial Line		[ ] R	[ ] L	[ ] Fem	[ ] Rad	[ ] Ax	Placed:   [ ] PICC:					[ ] Mediport  [ ] Urinary Catheter, Date Placed:   [x] Necessity of urinary, arterial, and venous catheters discussed    OTHER MEDICATIONS:  chlorhexidine 2% Cloths 1 Application(s) Topical daily      CODE STATUS:      IMAGING: HISTORY  74y Female PMHx of HFrEF s/p AICD, asthma, HTN, HLD, atrial fibrillation on eliquis p/w severe, sudden onset L sided abd pain which started yesterday morning after eating. She endorsed nausea at the time but no vomiting. She did have any fevers, chills, diarrhea or hematochezia.      On 6/30, CTAP w/ IV contrast showed proximal descending colon/splenic flexure diverticulitis with small amount of free fluid and 3cm low attenuation structure in the right adnexa ?fluid collection ?ovarian cyst. She developed dyspnea and wheezing after the CT scan and became hypoxic. Bedside US int he ED demonstrated a few B lines. She received 80mg total of IV lasix, 125 of solumedrol, duonebs x2 and narcan and she was admitted to MICU for management of respiratory and heart failure.     Through the next day (7/1), she was persistently tachycardic with BPs as low as 76/50, MAPs in low 60s. She was bolused with 500cc of crystalloid without improvement in blood pressure. She was then started on phenylephrine. Her lactate was uptrending from 2 to 2.6 to 3 on the arterial blood gas. A repeat CT scan showed "Acute diverticulitis at the splenic flexure with evidence of contained perforation. No drainable regional collection. Increased ascites in the deep pelvis." She was transferred to the SICU for further HD monitoring. Antibiotics were switched to Aztreonam and flagyl. Bedside ultrasound showed IV 1.5 minimally collapsable and echo consistent with euvolemic state. Antibiotics again switched to Ceftriaxone and flagyl. OGT switched to NGT. Patient became tachypneic during SBT on 7/6 so she could not be extubated at that time. She was kept intubated for a plan to return to the OR on 7/8.    24 HOUR EVENTS:  - Extubated  - C/w Lasix/ Metolazone q8h w/ net -2.5L  - Started on Ins drip for persistent hyperglycemia     SUBJECTIVE/ROS:  [x] A ten-point review of systems was otherwise negative except as noted.  [x] Due to altered mental status/intubation, subjective information were not able to be obtained from the patient. History was obtained, to the extent possible, from review of the chart and collateral sources of information.      NEURO  RASS: 0    GCS:15     CAM ICU: neg  Exam: awake, alert, oriented  Meds: acetaminophen    Suspension .. 975 milliGRAM(s) Oral every 6 hours  HYDROmorphone  Injectable 0.5 milliGRAM(s) IV Push every 3 hours PRN pain    [x] Adequacy of sedation and pain control has been assessed and adjusted      RESPIRATORY  RR: 22 (07-09-19 @ 00:00) (19 - 30)  SpO2: 100% (07-09-19 @ 00:04) (98% - 100%)  Wt(kg): --  Exam: unlabored, clear to auscultation bilaterally  Mechanical Ventilation: Mode: CPAP with PS, RR (patient): 21, FiO2: 40, PEEP: 5, PS: 5, ITime: 0.9, MAP: 7, PC: 72    [N/A] Extubation Readiness Assessed  Meds: ALBUTerol/ipratropium for Nebulization 3 milliLiter(s) Nebulizer every 6 hours  buDESOnide   0.5 milliGRAM(s) Respule 0.5 milliGRAM(s) Inhalation every 12 hours        CARDIOVASCULAR  HR: 100 (07-09-19 @ 00:04) (97 - 126)  BP: 114/57 (07-09-19 @ 00:00) (113/59 - 168/68)  BP(mean): 77 (07-09-19 @ 00:00) (77 - 98)  VBG - ( 08 Jul 2019 02:25 )  pH: 7.43  /  pCO2: 46    /  pO2: 39    / HCO3: 30    / Base Excess: 5.7   /  SaO2: 70     Lactate: 1.8                Exam: regular rate and rhythm  Cardiac Rhythm: sinus  Perfusion     [x]Adequate   [ ]Inadequate  Mentation   [x]Normal       [ ]Reduced  Extremities  [x]Warm         [ ]Cool  Volume Status [ ]Hypervolemic [x]Euvolemic [ ]Hypovolemic  Meds: metoprolol tartrate Injectable 2.5 milliGRAM(s) IV Push every 12 hours        GI/NUTRITION  Exam: soft, nontender, nondistended, abthera w/ good suction   Diet: NPO w/ TPN   Meds: pantoprazole  Injectable 40 milliGRAM(s) IV Push every 24 hours      GENITOURINARY  I&O's Detail    07-07 @ 07:01  -  07-08 @ 07:00  --------------------------------------------------------  IN:    fat emulsion (Fish Oil and Plant Based) 20% Infusion: 200.4 mL    IV PiggyBack: 50 mL    Solution: 50 mL    Solution: 100 mL    TPN (Total Parenteral Nutrition): 1992 mL  Total IN: 2392.4 mL    OUT:    Colostomy: 10 mL    Nasoenteral Tube: 200 mL    Ureteral Catheter: 4060 mL  Total OUT: 4270 mL    Total NET: -1877.6 mL      07-08 @ 07:01 - 07-09 @ 00:33  --------------------------------------------------------  IN:    Enteral Tube Flush: 300 mL    fat emulsion (Fish Oil and Plant Based) 20% Infusion: 133.6 mL    insulin regular Infusion: 42 mL    IV PiggyBack: 150 mL    TPN (Total Parenteral Nutrition): 1411 mL  Total IN: 2036.6 mL    OUT:    Nasoenteral Tube: 250 mL    Ureteral Catheter: 3410 mL    VAC (Vacuum Assisted Closure) System: 500 mL  Total OUT: 4160 mL    Total NET: -2123.4 mL          07-08    143  |  102  |  35<H>  ----------------------------<  170<H>  3.2<L>   |  32<H>  |  0.81    Ca    8.1<L>      08 Jul 2019 22:14  Phos  4.1     07-08  Mg     1.8     07-08      [ ] Ching catheter, indication: N/A  Meds: fat emulsion (Fish Oil and Plant Based) 20% Infusion 16.7 mL/Hr IV Continuous <Continuous>  iron sucrose IVPB 200 milliGRAM(s) IV Intermittent every 24 hours  Parenteral Nutrition - Adult 1 Each TPN Continuous <Continuous>  potassium chloride   Solution 20 milliEquivalent(s) Enteral Tube every 2 hours        HEMATOLOGIC  Meds: enoxaparin Injectable 40 milliGRAM(s) SubCutaneous daily    [x] VTE Prophylaxis                        x      23.0  )-----------( x        ( 08 Jul 2019 02:36 )             x        PT/INR - ( 08 Jul 2019 02:35 )   PT: 15.7 sec;   INR: 1.36 ratio         PTT - ( 08 Jul 2019 02:35 )  PTT:28.3 sec  Transfusion     [ ] PRBC   [ ] Platelets   [ ] FFP   [ ] Cryoprecipitate      INFECTIOUS DISEASES  WBC Count: 23.0 K/uL (07-08 @ 02:36)    RECENT CULTURES:    Meds: cefTRIAXone   IVPB 1000 milliGRAM(s) IV Intermittent every 24 hours  metroNIDAZOLE  IVPB 500 milliGRAM(s) IV Intermittent every 8 hours        ENDOCRINE  CAPILLARY BLOOD GLUCOSE      POCT Blood Glucose.: 167 mg/dL (09 Jul 2019 00:05)  POCT Blood Glucose.: 148 mg/dL (08 Jul 2019 23:01)  POCT Blood Glucose.: 182 mg/dL (08 Jul 2019 22:03)  POCT Blood Glucose.: 184 mg/dL (08 Jul 2019 21:00)  POCT Blood Glucose.: 189 mg/dL (08 Jul 2019 20:07)  POCT Blood Glucose.: 253 mg/dL (08 Jul 2019 18:47)  POCT Blood Glucose.: 249 mg/dL (08 Jul 2019 17:58)  POCT Blood Glucose.: 254 mg/dL (08 Jul 2019 16:59)  POCT Blood Glucose.: 292 mg/dL (08 Jul 2019 13:51)  POCT Blood Glucose.: 325 mg/dL (08 Jul 2019 13:49)  POCT Blood Glucose.: 251 mg/dL (08 Jul 2019 10:26)  POCT Blood Glucose.: 209 mg/dL (08 Jul 2019 05:05)  POCT Blood Glucose.: 239 mg/dL (08 Jul 2019 01:13)    Meds: insulin regular Infusion 3 Unit(s)/Hr IV Continuous <Continuous>        ACCESS DEVICES:  [ ] Peripheral IV  [ ] Central Venous Line	[ ] R	[ ] L	[ ] IJ	[ ] Fem	[ ] SC	Placed:   [ ] Arterial Line		[ ] R	[ ] L	[ ] Fem	[ ] Rad	[ ] Ax	Placed:   [ ] PICC:					[ ] Mediport  [ ] Urinary Catheter, Date Placed:   [x] Necessity of urinary, arterial, and venous catheters discussed    OTHER MEDICATIONS:  chlorhexidine 2% Cloths 1 Application(s) Topical daily      CODE STATUS:      IMAGING:

## 2019-07-09 NOTE — PRE-ANESTHESIA EVALUATION ADULT - NSPROPOSEDPROCEDFT_GEN_ALL_CORE
abdominal wound washout,possible closure
Wash out possible closure of abdominal wound
abdominal wash out, possible closure
exploratory laparotomy

## 2019-07-09 NOTE — CHART NOTE - NSCHARTNOTEFT_GEN_A_CORE
74F with perforated diverticulitis POD7 s/p exploratory laparotomy, left hemicolectomy, end colostomy, and ABThera Vac placement, POD6 and POD3 from RTOR for abdominal washout and abthera vac placement.     Now POD0 from RTOR for abdominal washout and abthera placement.    S: Patient intubated. Per nurse, patient doing well since procedure with no acute events.    O:  ICU Vital Signs Last 24 Hrs  T(C): 36.5 (09 Jul 2019 16:00), Max: 37.3 (09 Jul 2019 08:00)  T(F): 97.7 (09 Jul 2019 16:00), Max: 99.2 (09 Jul 2019 08:00)  HR: 117 (09 Jul 2019 18:00) (86 - 128)  BP: 141/67 (09 Jul 2019 18:00) (92/52 - 141/67)  BP(mean): 93 (09 Jul 2019 18:00) (70 - 93)  ABP: --  ABP(mean): --  RR: 28 (09 Jul 2019 18:00) (19 - 36)  SpO2: 100% (09 Jul 2019 18:00) (88% - 100%)    Gen: NAD, following commands  HEENT: NGT in place with 150cc dark brown output in canister  CVS: RRR  Resp: ETT secured in place  Abd: softly distended, stoma pink and viable with no gas/stool in bag, abthera vac in place with good seal and 100cc serosanguinous output in canister  Ext: +edema UE&LE b/l  : starr in place with 300cc clear yellow urine in bag    A/P: 74F with perforated diverticulitis POD7 s/p exploratory laparotomy, left hemicolectomy, end colostomy, and ABThera Vac placement, POD6 and POD3 from RTOR for abdominal washout and abthera vac placement. Now POD0 from RTOR for abdominal washout and abthera placement.  - F/U intraabdominal fluid cultures  - Appreciate excellent SICU care    Green [id number] with perforated diverticulitis POD7 s/p exploratory laparotomy, left hemicolectomy, end colostomy, and ABThera Vac placement, POD6 and POD4 from RTOR for abdominal washout and abthera vac placement.     Now POD0 from RTOR for abdominal washout and abthera placement.    S: Patient intubated. Per nurse, patient doing well since procedure with no acute events.    O:  ICU Vital Signs Last 24 Hrs  T(C): 36.5 (09 Jul 2019 16:00), Max: 37.3 (09 Jul 2019 08:00)  T(F): 97.7 (09 Jul 2019 16:00), Max: 99.2 (09 Jul 2019 08:00)  HR: 117 (09 Jul 2019 18:00) (86 - 128)  BP: 141/67 (09 Jul 2019 18:00) (92/52 - 141/67)  BP(mean): 93 (09 Jul 2019 18:00) (70 - 93)  ABP: --  ABP(mean): --  RR: 28 (09 Jul 2019 18:00) (19 - 36)  SpO2: 100% (09 Jul 2019 18:00) (88% - 100%)    Gen: NAD, following commands  HEENT: NGT in place with 150cc dark brown output in canister  CVS: RRR  Resp: ETT secured in place  Abd: softly distended, stoma pink and viable with no gas/stool in bag, abthera vac in place with good seal and 100cc serosanguinous output in canister  Ext: +edema UE&LE b/l  : starr in place with 300cc clear yellow urine in bag    A/P: 74F with perforated diverticulitis POD7 s/p exploratory laparotomy, left hemicolectomy, end colostomy, and ABThera Vac placement, POD6 and POD4 from RTOR for abdominal washout and abthera vac placement. Now POD0 from RTOR for abdominal washout and abthera placement.  - F/U intraabdominal fluid cultures  - Appreciate excellent SICU care    Green [id number]

## 2019-07-09 NOTE — PROGRESS NOTE ADULT - ASSESSMENT
A/P:74F admitted with abdominal pain and exam findings consistent with peritonitis secondary to acute diverticulitis at the splenic flexure with evidence of contained perforation now POD3 s/p Exploratory laparotomy, left hemicolectomy, end colostomy, and ABThera Vac placement and POD2 from RTOR for abdominal washout and Abthera vac placement, off pressors. TPN started 7/5 for Protein-Calorie Malnutrition    - TPN formula @ goal: Carbohydrates: 210grams, Amino Acid: 120grams, 40g lipids in 2400 ml, Total volume decreased to 2.0L from 2400 ml  - Pt on Ceftriaxone - can not run this in PICC  - Strict Intake and Output.,   - Weights three times a week  - Monitor BMP, Mg+, Ionized Ca++, Phosphorus daily  - Check Triglycerides daily for first 3 days of TPN, then monthly   - Pre-albumin weekly.  - Hyperglycemia-  Increased to from 20U to 30U insulin and will adjust accordingly as Dextrose increased today and based on FS.        Fingersticks to monitor glucose every 6 hours until stable then may be decreased to twice a day, coverage with ISS - to be ordered by primary team  - HyperNa- No NaAce or NaCl in TPN, only NaPhos 30mmol, increased TV to 2400ml originally, however SICU requesting volume be decreased since pt is being diuresed. TV decreased to 2.0l, and SICU advised to use Metolazone, loop diuretic to improve the hypernatremia, and decreased dose of lasix as titrating fluid needs.  - Hypophosphatemia- NaPhos at 30mmol  - Please order Urine lytes  - Continue as per SICU/ Surgery, will follow with you  Please do not hold TPN when pt goes to OR tomorrow for washout.  If TPN stopped, please hang D10.      TPN team, pager [phone], spectra [phone]  D/w Adela Cross A/P:74F admitted with abdominal pain and exam findings consistent with peritonitis secondary to acute diverticulitis at the splenic flexure with evidence of contained perforation now POD3 s/p Exploratory laparotomy, left hemicolectomy, end colostomy, and ABThera Vac placement and POD2 from RTOR for abdominal washout and Abthera vac placement, off pressors. TPN started 7/5 for Protein-Calorie Malnutrition    - TPN formula @ goal: Carbohydrates: 210grams, Amino Acid: 120grams, 40g lipids in 2400 ml, Total volume decreased to 2.0L from 2400 ml  - Pt on Ceftriaxone - can not run this in PICC  - Strict Intake and Output.,   - Weights three times a week  - Monitor BMP, Mg+, Ionized Ca++, Phosphorus daily  - Check Triglycerides daily for first 3 days of TPN, then monthly   - Pre-albumin weekly.  - Hyperglycemia-  Increased to from 30U to 40U insulin, pt may need Insulin gtt in the periop period. Difficult to predict stress needs without causing hypoglycemia      Fingersticks to monitor glucose every 6 hours until stable then may be decreased to twice a day, coverage with ISS - to be ordered by primary team  - HyperNa- No NaAce or NaCl in TPN, only NaPhos 30mmol, increased TV to 2400ml originally, however SICU requesting volume be decreased since pt is being diuresed. TV decreased to 2.0L and SICU advised to use Metolazone, loop diuretic to improve the hypernatremia, and decreased dose of lasix as titrating fluid needs.  - Hypophosphatemia- NaPhos at 30mmol  - Please order Urine lytes  - Continue as per SICU/ Surgery, will follow with you  Please do not hold TPN when pt goes to OR tomorrow for washout.  If TPN stopped, please hang D10.      TPN team, pager [phone], spectra [phone]  D/w Adela Huggins and HERNAN Cross

## 2019-07-09 NOTE — PROGRESS NOTE ADULT - ASSESSMENT
73 y/o F with PMHx of HFrEF s/p AICD, asthma, HTN, afib on eliquis p/w abd pain s/p ex-lap, left hemicolectomy and end colostomy with abthera placement for perforated diverticulitis on 7/2, abdominal washout x2 (7/3, 7/5).  - Appreciate care by SICU team  - Continue diuresis in preparation for abdominal closure in OR  - Return to OR for abdominal washout and possible closure     Green [id number]

## 2019-07-10 ENCOUNTER — TRANSCRIPTION ENCOUNTER (OUTPATIENT)
Age: 74
End: 2019-07-10

## 2019-07-10 LAB
ANION GAP SERPL CALC-SCNC: 11 MMOL/L — SIGNIFICANT CHANGE UP (ref 5–17)
ANION GAP SERPL CALC-SCNC: 9 MMOL/L — SIGNIFICANT CHANGE UP (ref 5–17)
APPEARANCE UR: CLEAR — SIGNIFICANT CHANGE UP
BILIRUB UR-MCNC: NEGATIVE — SIGNIFICANT CHANGE UP
BUN SERPL-MCNC: 49 MG/DL — HIGH (ref 7–23)
BUN SERPL-MCNC: 58 MG/DL — HIGH (ref 7–23)
CALCIUM SERPL-MCNC: 7.9 MG/DL — LOW (ref 8.4–10.5)
CALCIUM SERPL-MCNC: 8.1 MG/DL — LOW (ref 8.4–10.5)
CHLORIDE SERPL-SCNC: 97 MMOL/L — SIGNIFICANT CHANGE UP (ref 96–108)
CHLORIDE SERPL-SCNC: 98 MMOL/L — SIGNIFICANT CHANGE UP (ref 96–108)
CO2 SERPL-SCNC: 30 MMOL/L — SIGNIFICANT CHANGE UP (ref 22–31)
CO2 SERPL-SCNC: 31 MMOL/L — SIGNIFICANT CHANGE UP (ref 22–31)
COLOR SPEC: YELLOW — SIGNIFICANT CHANGE UP
CREAT SERPL-MCNC: 1.05 MG/DL — SIGNIFICANT CHANGE UP (ref 0.5–1.3)
CREAT SERPL-MCNC: 1.06 MG/DL — SIGNIFICANT CHANGE UP (ref 0.5–1.3)
DIFF PNL FLD: NEGATIVE — SIGNIFICANT CHANGE UP
GAS PNL BLDA: SIGNIFICANT CHANGE UP
GLUCOSE BLDC GLUCOMTR-MCNC: 115 MG/DL — HIGH (ref 70–99)
GLUCOSE BLDC GLUCOMTR-MCNC: 156 MG/DL — HIGH (ref 70–99)
GLUCOSE BLDC GLUCOMTR-MCNC: 212 MG/DL — HIGH (ref 70–99)
GLUCOSE BLDC GLUCOMTR-MCNC: 218 MG/DL — HIGH (ref 70–99)
GLUCOSE BLDC GLUCOMTR-MCNC: 224 MG/DL — HIGH (ref 70–99)
GLUCOSE BLDC GLUCOMTR-MCNC: 229 MG/DL — HIGH (ref 70–99)
GLUCOSE BLDC GLUCOMTR-MCNC: 231 MG/DL — HIGH (ref 70–99)
GLUCOSE BLDC GLUCOMTR-MCNC: 235 MG/DL — HIGH (ref 70–99)
GLUCOSE BLDC GLUCOMTR-MCNC: 240 MG/DL — HIGH (ref 70–99)
GLUCOSE BLDC GLUCOMTR-MCNC: 265 MG/DL — HIGH (ref 70–99)
GLUCOSE BLDC GLUCOMTR-MCNC: 267 MG/DL — HIGH (ref 70–99)
GLUCOSE SERPL-MCNC: 160 MG/DL — HIGH (ref 70–99)
GLUCOSE SERPL-MCNC: 251 MG/DL — HIGH (ref 70–99)
GLUCOSE UR QL: NEGATIVE — SIGNIFICANT CHANGE UP
HCT VFR BLD CALC: 26.6 % — LOW (ref 34.5–45)
HGB BLD-MCNC: 9.2 G/DL — LOW (ref 11.5–15.5)
KETONES UR-MCNC: SIGNIFICANT CHANGE UP
LEUKOCYTE ESTERASE UR-ACNC: ABNORMAL
MAGNESIUM SERPL-MCNC: 2 MG/DL — SIGNIFICANT CHANGE UP (ref 1.6–2.6)
MAGNESIUM SERPL-MCNC: 2.2 MG/DL — SIGNIFICANT CHANGE UP (ref 1.6–2.6)
MCHC RBC-ENTMCNC: 32.7 PG — SIGNIFICANT CHANGE UP (ref 27–34)
MCHC RBC-ENTMCNC: 34.6 GM/DL — SIGNIFICANT CHANGE UP (ref 32–36)
MCV RBC AUTO: 94.4 FL — SIGNIFICANT CHANGE UP (ref 80–100)
NITRITE UR-MCNC: NEGATIVE — SIGNIFICANT CHANGE UP
PH UR: 5.5 — SIGNIFICANT CHANGE UP (ref 5–8)
PHOSPHATE SERPL-MCNC: 5 MG/DL — HIGH (ref 2.5–4.5)
PHOSPHATE SERPL-MCNC: 5.2 MG/DL — HIGH (ref 2.5–4.5)
PLATELET # BLD AUTO: 143 K/UL — LOW (ref 150–400)
POTASSIUM SERPL-MCNC: 3.8 MMOL/L — SIGNIFICANT CHANGE UP (ref 3.5–5.3)
POTASSIUM SERPL-MCNC: 4.5 MMOL/L — SIGNIFICANT CHANGE UP (ref 3.5–5.3)
POTASSIUM SERPL-SCNC: 3.8 MMOL/L — SIGNIFICANT CHANGE UP (ref 3.5–5.3)
POTASSIUM SERPL-SCNC: 4.5 MMOL/L — SIGNIFICANT CHANGE UP (ref 3.5–5.3)
PROT UR-MCNC: SIGNIFICANT CHANGE UP
RBC # BLD: 2.82 M/UL — LOW (ref 3.8–5.2)
RBC # FLD: 14.2 % — SIGNIFICANT CHANGE UP (ref 10.3–14.5)
SODIUM SERPL-SCNC: 137 MMOL/L — SIGNIFICANT CHANGE UP (ref 135–145)
SODIUM SERPL-SCNC: 139 MMOL/L — SIGNIFICANT CHANGE UP (ref 135–145)
SP GR SPEC: 1.02 — SIGNIFICANT CHANGE UP (ref 1.01–1.02)
UROBILINOGEN FLD QL: NEGATIVE — SIGNIFICANT CHANGE UP
WBC # BLD: 31.7 K/UL — HIGH (ref 3.8–10.5)
WBC # FLD AUTO: 31.7 K/UL — HIGH (ref 3.8–10.5)

## 2019-07-10 PROCEDURE — 99232 SBSQ HOSP IP/OBS MODERATE 35: CPT

## 2019-07-10 PROCEDURE — 93010 ELECTROCARDIOGRAM REPORT: CPT | Mod: 76

## 2019-07-10 PROCEDURE — 99232 SBSQ HOSP IP/OBS MODERATE 35: CPT | Mod: GC

## 2019-07-10 PROCEDURE — 99223 1ST HOSP IP/OBS HIGH 75: CPT

## 2019-07-10 PROCEDURE — 99233 SBSQ HOSP IP/OBS HIGH 50: CPT

## 2019-07-10 PROCEDURE — 71045 X-RAY EXAM CHEST 1 VIEW: CPT | Mod: 26

## 2019-07-10 RX ORDER — ELECTROLYTE SOLUTION,INJ
1 VIAL (ML) INTRAVENOUS
Refills: 0 | Status: DISCONTINUED | OUTPATIENT
Start: 2019-07-10 | End: 2019-07-11

## 2019-07-10 RX ORDER — ACETAZOLAMIDE 250 MG/1
250 TABLET ORAL ONCE
Refills: 0 | Status: COMPLETED | OUTPATIENT
Start: 2019-07-10 | End: 2019-07-11

## 2019-07-10 RX ORDER — MAGNESIUM SULFATE 500 MG/ML
2 VIAL (ML) INJECTION ONCE
Refills: 0 | Status: COMPLETED | OUTPATIENT
Start: 2019-07-10 | End: 2019-07-10

## 2019-07-10 RX ORDER — INSULIN HUMAN 100 [IU]/ML
3 INJECTION, SOLUTION SUBCUTANEOUS ONCE
Refills: 0 | Status: COMPLETED | OUTPATIENT
Start: 2019-07-10 | End: 2019-07-10

## 2019-07-10 RX ORDER — ACETAMINOPHEN 500 MG
1000 TABLET ORAL ONCE
Refills: 0 | Status: COMPLETED | OUTPATIENT
Start: 2019-07-10 | End: 2019-07-10

## 2019-07-10 RX ORDER — ACETAZOLAMIDE 250 MG/1
250 TABLET ORAL ONCE
Refills: 0 | Status: COMPLETED | OUTPATIENT
Start: 2019-07-10 | End: 2019-07-10

## 2019-07-10 RX ORDER — INSULIN HUMAN 100 [IU]/ML
1 INJECTION, SOLUTION SUBCUTANEOUS
Qty: 100 | Refills: 0 | Status: DISCONTINUED | OUTPATIENT
Start: 2019-07-10 | End: 2019-07-10

## 2019-07-10 RX ORDER — INSULIN HUMAN 100 [IU]/ML
3 INJECTION, SOLUTION SUBCUTANEOUS
Qty: 100 | Refills: 0 | Status: DISCONTINUED | OUTPATIENT
Start: 2019-07-10 | End: 2019-07-11

## 2019-07-10 RX ORDER — ACETAMINOPHEN 500 MG
1000 TABLET ORAL ONCE
Refills: 0 | Status: COMPLETED | OUTPATIENT
Start: 2019-07-11 | End: 2019-07-11

## 2019-07-10 RX ORDER — BENZOCAINE AND MENTHOL 5; 1 G/100ML; G/100ML
1 LIQUID ORAL THREE TIMES A DAY
Refills: 0 | Status: DISCONTINUED | OUTPATIENT
Start: 2019-07-10 | End: 2019-07-20

## 2019-07-10 RX ORDER — I.V. FAT EMULSION 20 G/100ML
16.7 EMULSION INTRAVENOUS
Qty: 40 | Refills: 0 | Status: DISCONTINUED | OUTPATIENT
Start: 2019-07-10 | End: 2019-07-11

## 2019-07-10 RX ORDER — FUROSEMIDE 40 MG
40 TABLET ORAL ONCE
Refills: 0 | Status: COMPLETED | OUTPATIENT
Start: 2019-07-10 | End: 2019-07-10

## 2019-07-10 RX ORDER — ACETAZOLAMIDE 250 MG/1
250 TABLET ORAL ONCE
Refills: 0 | Status: DISCONTINUED | OUTPATIENT
Start: 2019-07-10 | End: 2019-07-10

## 2019-07-10 RX ORDER — FUROSEMIDE 40 MG
40 TABLET ORAL ONCE
Refills: 0 | Status: COMPLETED | OUTPATIENT
Start: 2019-07-10 | End: 2019-07-11

## 2019-07-10 RX ORDER — FUROSEMIDE 40 MG
40 TABLET ORAL ONCE
Refills: 0 | Status: DISCONTINUED | OUTPATIENT
Start: 2019-07-10 | End: 2019-07-10

## 2019-07-10 RX ADMIN — I.V. FAT EMULSION 16.7 ML/HR: 20 EMULSION INTRAVENOUS at 20:31

## 2019-07-10 RX ADMIN — Medication 1000 MILLIGRAM(S): at 17:11

## 2019-07-10 RX ADMIN — HYDROMORPHONE HYDROCHLORIDE 0.5 MILLIGRAM(S): 2 INJECTION INTRAMUSCULAR; INTRAVENOUS; SUBCUTANEOUS at 10:47

## 2019-07-10 RX ADMIN — HYDROMORPHONE HYDROCHLORIDE 0.5 MILLIGRAM(S): 2 INJECTION INTRAMUSCULAR; INTRAVENOUS; SUBCUTANEOUS at 16:02

## 2019-07-10 RX ADMIN — HYDROMORPHONE HYDROCHLORIDE 0.5 MILLIGRAM(S): 2 INJECTION INTRAMUSCULAR; INTRAVENOUS; SUBCUTANEOUS at 10:32

## 2019-07-10 RX ADMIN — Medication 3 MILLILITER(S): at 05:42

## 2019-07-10 RX ADMIN — Medication 100 MILLIEQUIVALENT(S): at 00:01

## 2019-07-10 RX ADMIN — Medication 400 MILLIGRAM(S): at 16:56

## 2019-07-10 RX ADMIN — CHLORHEXIDINE GLUCONATE 15 MILLILITER(S): 213 SOLUTION TOPICAL at 06:10

## 2019-07-10 RX ADMIN — Medication 4: at 09:10

## 2019-07-10 RX ADMIN — CHLORHEXIDINE GLUCONATE 15 MILLILITER(S): 213 SOLUTION TOPICAL at 21:15

## 2019-07-10 RX ADMIN — Medication 2.5 MILLIGRAM(S): at 04:06

## 2019-07-10 RX ADMIN — CHLORHEXIDINE GLUCONATE 15 MILLILITER(S): 213 SOLUTION TOPICAL at 13:26

## 2019-07-10 RX ADMIN — INSULIN HUMAN 3 UNIT(S): 100 INJECTION, SOLUTION SUBCUTANEOUS at 20:31

## 2019-07-10 RX ADMIN — HYDROMORPHONE HYDROCHLORIDE 0.5 MILLIGRAM(S): 2 INJECTION INTRAMUSCULAR; INTRAVENOUS; SUBCUTANEOUS at 06:33

## 2019-07-10 RX ADMIN — Medication 0.5 MILLIGRAM(S): at 05:42

## 2019-07-10 RX ADMIN — I.V. FAT EMULSION 16.7 ML/HR: 20 EMULSION INTRAVENOUS at 17:45

## 2019-07-10 RX ADMIN — Medication 1 EACH: at 17:45

## 2019-07-10 RX ADMIN — Medication 2.5 MILLIGRAM(S): at 16:09

## 2019-07-10 RX ADMIN — Medication 2: at 02:02

## 2019-07-10 RX ADMIN — Medication 0.5 MILLIGRAM(S): at 17:16

## 2019-07-10 RX ADMIN — Medication 3 MILLILITER(S): at 18:02

## 2019-07-10 RX ADMIN — Medication 4: at 06:11

## 2019-07-10 RX ADMIN — IRON SUCROSE 110 MILLIGRAM(S): 20 INJECTION, SOLUTION INTRAVENOUS at 18:48

## 2019-07-10 RX ADMIN — IMIPENEM AND CILASTATIN 100 MILLIGRAM(S): 250; 250 INJECTION, POWDER, FOR SOLUTION INTRAVENOUS at 17:44

## 2019-07-10 RX ADMIN — Medication 400 MILLIGRAM(S): at 11:18

## 2019-07-10 RX ADMIN — HYDROMORPHONE HYDROCHLORIDE 0.5 MILLIGRAM(S): 2 INJECTION INTRAMUSCULAR; INTRAVENOUS; SUBCUTANEOUS at 21:15

## 2019-07-10 RX ADMIN — Medication 4: at 13:26

## 2019-07-10 RX ADMIN — Medication 975 MILLIGRAM(S): at 00:00

## 2019-07-10 RX ADMIN — Medication 40 MILLIGRAM(S): at 12:58

## 2019-07-10 RX ADMIN — PANTOPRAZOLE SODIUM 40 MILLIGRAM(S): 20 TABLET, DELAYED RELEASE ORAL at 06:10

## 2019-07-10 RX ADMIN — Medication 1 EACH: at 20:31

## 2019-07-10 RX ADMIN — Medication 6: at 17:05

## 2019-07-10 RX ADMIN — HYDROMORPHONE HYDROCHLORIDE 0.5 MILLIGRAM(S): 2 INJECTION INTRAMUSCULAR; INTRAVENOUS; SUBCUTANEOUS at 16:17

## 2019-07-10 RX ADMIN — Medication 3 MILLILITER(S): at 11:26

## 2019-07-10 RX ADMIN — IMIPENEM AND CILASTATIN 100 MILLIGRAM(S): 250; 250 INJECTION, POWDER, FOR SOLUTION INTRAVENOUS at 06:10

## 2019-07-10 RX ADMIN — Medication 975 MILLIGRAM(S): at 00:30

## 2019-07-10 RX ADMIN — Medication 2.5 MILLIGRAM(S): at 21:15

## 2019-07-10 RX ADMIN — Medication 975 MILLIGRAM(S): at 06:10

## 2019-07-10 RX ADMIN — Medication 1000 MILLIGRAM(S): at 11:33

## 2019-07-10 RX ADMIN — IMIPENEM AND CILASTATIN 100 MILLIGRAM(S): 250; 250 INJECTION, POWDER, FOR SOLUTION INTRAVENOUS at 11:44

## 2019-07-10 RX ADMIN — INSULIN HUMAN 3 UNIT(S)/HR: 100 INJECTION, SOLUTION SUBCUTANEOUS at 21:15

## 2019-07-10 RX ADMIN — HYDROMORPHONE HYDROCHLORIDE 0.5 MILLIGRAM(S): 2 INJECTION INTRAMUSCULAR; INTRAVENOUS; SUBCUTANEOUS at 21:30

## 2019-07-10 RX ADMIN — IMIPENEM AND CILASTATIN 100 MILLIGRAM(S): 250; 250 INJECTION, POWDER, FOR SOLUTION INTRAVENOUS at 00:01

## 2019-07-10 RX ADMIN — Medication 50 GRAM(S): at 03:06

## 2019-07-10 RX ADMIN — ENOXAPARIN SODIUM 40 MILLIGRAM(S): 100 INJECTION SUBCUTANEOUS at 11:17

## 2019-07-10 RX ADMIN — ACETAZOLAMIDE 250 MILLIGRAM(S): 250 TABLET ORAL at 13:08

## 2019-07-10 NOTE — PROGRESS NOTE ADULT - PROBLEM SELECTOR PLAN 3
- Management as above  - Home medications of enalapril and spironolactone currently on hold in setting of vasodilatory shock  - brief runs of NSVT; can continue metoprolol 2.5 mg IV q6h however would be discontinue if develops bronchospasm  - would continue diuresis to keep net negative

## 2019-07-10 NOTE — PROGRESS NOTE ADULT - ATTENDING COMMENTS
74yrs, HFrEF 45%, CRTD. Asthma. HTN, hyperlipidemia. Afib eloquis. IDDM. Diverticulosis.   home meds: enalapril 10 bid, Lasix 40 bid, Ald 25, no BB due to asthma. eloquis 5 bid. ASA, atorva 20.   admitted with severe abdo pain june 30. acute diverticulitis with contained perf. explor lap with left nisa and colostomy 7.1.   3 washouts for perforation. wound not closed. TPN. extubated.   current meds: TPN, IV lasix PRN. Diamox. lopressor IV 2.5 Q 6. antibiotics.   CVP 8 last week. bed weights only.   I/O: -500, -3.3, -1.9, -1  100-120Vpacing, occas VPCs. 100/-120,   07-10    139  |  98  |  49<H>  ----------------------------<  160<H>  4.5   |  30  |  1.06    Ca    8.1<L>      10 Jul 2019 01:53  Phos  5.2     07-10  Mg     2.0     07-10                          9.2    31.7  )-----------( 143      ( 10 Jul 2019 01:53 )             26.6 74yrs, HFrEF 45%, CRTD. Asthma. HTN, hyperlipidemia. Afib eloquis. IDDM. Diverticulosis.   home meds: enalapril 10 bid, Lasix 40 bid, Ald 25, no BB due to asthma. eloquis 5 bid. ASA, atorva 20.   admitted with severe abdo pain june 30. acute diverticulitis with contained perf. explor lap with left nisa and colostomy 7.1.   3 washouts for perforation. wound not closed. TPN. extubated.   current meds: TPN, IV lasix PRN. Diamox. lopressor IV 2.5 Q 6. antibiotics.   CVP 8 last week. bed weights only.   I/O: -500, -3.3, -1.9, -1  100-120Vpacing, occas VPCs. 100/-120/,   diffusely edematous.   07-10    139  |  98  |  49<H>  ----------------------------<  160<H>  4.5   |  30  |  1.06    Ca    8.1<L>      10 Jul 2019 01:53  Phos  5.2     07-10  Mg     2.0     07-10                          9.2    31.7  )-----------( 143      ( 10 Jul 2019 01:53 )             26.6  multiple post surgical issues including infection, hypoalbuminemia.   patient has been in negative balance and would continue current diuretic regimen. some of the edema my be related to low albumin. of note CVP was 8 when last measured.   patient had not previously tolerated BB due to bronchospasm. would consider decrease or d/c.   recommend vasodilators as tolerated: hydralazine 10 to 20 to 30  tid for MAP 70-80.   Nathan Wayne

## 2019-07-10 NOTE — PROGRESS NOTE ADULT - ASSESSMENT
A/P: 74F with perforated diverticulitis s/p exploratory laparotomy, left hemicolectomy, end colostomy, and ABThera Vac placement, s/p 3x RTOR for abdominal washout and abthera vac placement.     - F/U intraabdominal fluid cultures  - Appreciate excellent SICU care    Green [id number]. A/P: 74F with perforated diverticulitis s/p exploratory laparotomy, left hemicolectomy, end colostomy, and ABThera Vac placement, s/p 3x RTOR for abdominal washout and abthera vac placement.   - Plan for abdominal closure with bridging mesh  - F/U intraabdominal fluid cultures  - Appreciate excellent SICU care    Green [phone].

## 2019-07-10 NOTE — PROGRESS NOTE ADULT - ASSESSMENT
ASSESSMENT  74F admitted with abdominal pain and exam findings consistent with peritonitis secondary to acute diverticulitis at the splenic flexure with evidence of contained perforation s/p Exploratory laparotomy, left hemicolectomy, end colostomy, and ABThera Vac placement (7/2) and s/p multiple RTOR for abdominal washout and abthera vac placement (7/3, 7/5, 7/10), off pressors.     PLAN:  NEURO: acute pain control  - Pain control with Tylenol / dilaudid PRN   - off sedation    RESPIRATORY: intubated, PMHx of Asthma  - AM cxr  - SBT in am w/ goal to extubate - abg during trial  - on duoneb and pulmicort    CARDIOVASCULAR:  off pressors, tachycardia  - C/w diuresis Lasix/Metolazone    - metoprolol 2.5 q6h  - Hold home anti-hypertensives  - monitor i/o    GI/NUTRITION: s/p ex lap and L hemicolectomy with end colostomy and ABThera VAC placement and washout  - NPO, IVF, NGT   - continue TPN - maximum volume of 2L per day  - monitor for ostomy function - no fxn yet   - RTOR later this week for possible closure w/ mesh     GENITOURINARY/RENAL: DIANNA on CKD stage 3  - continue diuresis - 40 lasix q8h, dose metalozone daily  - BUN Cr stable and at baseline    HEMATOLOGIC: H/H stable  - Patient does NOT accept blood products  - Started on Iron IV x 5days  - Minimize blood draws   - Lovenox for DVT ppx  - Hold ASA and A/C    INFECTIOUS DISEASE: Colonic perforation, uptrending leukocytosis  - Abx changed to imipenem (previously on ceftriaxone and flagyl)  - afebrile  - f/u OR cultures for future abx therapy    ENDOCRINE:  - insulin drip for hyperglycemia    MSK:  - PT       Lines:  NG, starr, VAC, PIV, PICC      SICU [id number] ASSESSMENT  74F admitted with abdominal pain and exam findings consistent with peritonitis secondary to acute diverticulitis at the splenic flexure with evidence of contained perforation s/p Exploratory laparotomy, left hemicolectomy, end colostomy, and ABThera Vac placement (7/2) and s/p multiple RTOR for abdominal washout and abthera vac placement (7/3, 7/5, 7/10), off pressors.     PLAN:  NEURO: acute pain control  - Pain control with Tylenol / dilaudid PRN   - off sedation    RESPIRATORY: intubated, PMHx of Asthma  - AM CXR appears grossly unchanged, with some basilar hazziness  - SBT passed this AM. blood gas with no oxygenation issues, however has respiratory acidosis, most likely a compensation to metabolic alkalosis induced by continued diuresis  - on duoneb and pulmicort  - lasix and diamox with plan to get neg 1-2 liters  - attempt to extubate later today    CARDIOVASCULAR:  off pressors, tachycardia  - C/w diuresis Lasix/Metolazone    - metoprolol 2.5 q6h, continues to be tachycardic, increase  - Hold home anti-hypertensives  - monitor i/o  - adequate urine output (40-80cc/hr)  - 24hr balance 500 cc negative, 800 from NG tube and nothing from VAC  - today weight 88kg downtrending    GI/NUTRITION: s/p ex lap and L hemicolectomy with end colostomy and ABThera VAC placement and washout  - NPO, IVF, NGT   - continue TPN - maximum volume of 2L per day  - monitor for ostomy function - no fxn yet   - RTOR later this week for possible closure w/ mesh   - non tender abdomen    GENITOURINARY/RENAL: DIANNA on CKD stage 3  - continue diuresis as described in CV system  - BUN Cr mildly lower than yesterday    HEMATOLOGIC: H/H stable  - Patient does NOT accept blood products  - Started on Iron IV x 5days  - Minimize blood draws   - Lovenox for DVT ppx  - Hold ASA and A/C    INFECTIOUS DISEASE: Colonic perforation, uptrending leukocytosis  - Abx changed to imipenem (previously on ceftriaxone and flagyl)  - afebrile  - f/u OR cultures for course of antibiotic therapy  - report per OR of purulent material in the abdomen  - worsening leukocytosis  - repeat blood cultures (pediatric tube) to be drawn today    ENDOCRINE:  - insulin drip for hyperglycemia  - insulin in TPN 40 unit  - increase SSI to high  - adjust insulin in TPN given persistent hyperglycemia    MSK:  - PT to see patient today  - OOB if extubated   - OR tomorrow    Lines:  ro LEDBETTER, VAC, PIV, PICC      SICU [id number]

## 2019-07-10 NOTE — PROGRESS NOTE ADULT - ASSESSMENT
A/P:74F admitted with abdominal pain and exam findings consistent with peritonitis secondary to acute diverticulitis at the splenic flexure with evidence of contained perforation now POD3 s/p Exploratory laparotomy, left hemicolectomy, end colostomy, and ABThera Vac placement and POD2 from RTOR for abdominal washout and Abthera vac placement, off pressors. TPN started 7/5 for Protein-Calorie Malnutrition    - TPN formula @ goal: Carbohydrates: 210grams, Amino Acid: 120grams, 40g lipids in 2400 ml, Total volume decreased to 2.0L from 2400 ml  - Pt on Ceftriaxone - can not run this in PICC while pt receiving  TPN   - Strict Intake and Output.,   - Weights three times a week  - Monitor BMP, Mg, Ionized Ca, Phosphorus daily  - Check Triglycerides daily for first 3 days of TPN, then monthly   - Pre-albumin weekly.  - Hyperglycemia-  Increased to from 40U to 45U insulin, pt may need Insulin gtt in the periop period. Difficult to predict stress needs without causing hypoglycemia      Fingersticks to monitor glucose every 6 hours until stable then may be decreased to twice a day, coverage with ISS - to be ordered by primary team  - HyperNa- No NaAce or NaCl in TPN, only NaPhos 30mmol, increased TV to 2400ml originally, however SICU requesting volume be decreased since pt is being diuresed. TV decreased to 2.0L and SICU advised to use Metolazone, loop diuretic to improve the hypernatremia, and decreased dose of lasix as titrating fluid needs.  - Hyperhosphatemia- NaPhos decreased from 30 to 15mmol  - Continue as per SICU/ Surgery, will follow with you d/w team  - Please do not hold TPN if/when pt goes to OR.  If TPN stopped, please hang D10.      TPN team, pager [phone], spectra [phone]  D/w Adela Huggins and HERNAN Cross

## 2019-07-10 NOTE — CHART NOTE - NSCHARTNOTEFT_GEN_A_CORE
PREOPERATIVE NOTE    Pre-op Diagnosis: Diverticulitis of large intestine without perforation or abscess without bleeding s/p exploratory laparotomy, Lupe's, RTOR for abdominal washout x3, abthera vac placement  Procedure: Abdominal washout, abdominal closure with mesh  Surgeon: Dr. Alvarado    Labs:             9.2    31.7  )-----------( 143      ( 10 Jul 2019 01:53 )             26.6     07-10    139  |  98  |  49<H>  ----------------------------<  160<H>  4.5   |  30  |  1.06    Ca    8.1<L>      10 Jul 2019 01:53  Phos  5.2     0710  Mg     2.0     0710    Urinalysis Basic - ( 10 Jul 2019 08:47 )  Color: Yellow / Appearance: Clear / S.024 / pH: x  Gluc: x / Ketone: Trace  / Bili: Negative / Urobili: Negative   Blood: x / Protein: Trace / Nitrite: Negative   Leuk Esterase: Small / RBC: 8 /hpf / WBC 6 /HPF   Sq Epi: x / Non Sq Epi: 2 /hpf / Bacteria: Negative    Type & Screen: pending  CXR: 7/10/19 06:44 IMPRESSION: Clear lungs.  EK/10/19 03:04 Atrial-sensed ventricular-paced rhythm. Biventricular pacemaker detected      A/P: 74y Female planned for abdominal washout, abdominal closure with mesh.  1. NPO past midnight, except medications  2. OR tomorrow for abdominal washout, abdominal closure with mesh with Dr. Alvarado  3. No blood products - Uatsdin  4. Appreciate continued care by SICU    Green [phone]

## 2019-07-10 NOTE — PROGRESS NOTE ADULT - SUBJECTIVE AND OBJECTIVE BOX
[facility] NUTRITION SUPPORT / TPN -- FOLLOW UP NOTE  --------------------------------------------------------------------------------    24 hour events/subjective:    -Went to OR for abdominal washout and abthera vac placement. Abdominal wall flaps made, however still unable to close. Cultures sent from the OR as purulent fluid present in abdomen. Pt left intubated  -Left intubated overnight  -Abx changed to imipenem  -Continued diuresis w/ lasix and metalozone  -Tachycardic to 120s overnight  -Increased dose of lopressor to 2.5 [id number]:  Diet, NPO:   Except Medications (07-09-19 @ 14:09)      Appetite: [ x ]Poor [  ]Adequate [  ]Good  Caloric intake:  [   x]  Adequate   [   ] Inadequate    ROS:  pt unable to offer    ALLERGIES & MEDICATIONS  --------------------------------------------------------------------------------  ALLERGIES    Coreg (Other)  digoxin (Other; Short breath (Mild to Mod))  Lopressor (Short breath)  penicillins (Hives)      INTOLERANCES  metoprolol (Other)  Solu-Medrol (Other (Mild to Mod))      STANDING INPATIENT MEDICATIONS    acetaminophen  IVPB .. 1000 milliGRAM(s) IV Intermittent once  acetaminophen  IVPB .. 1000 milliGRAM(s) IV Intermittent once  ALBUTerol/ipratropium for Nebulization 3 milliLiter(s) Nebulizer every 6 hours  buDESOnide    Inhalation Suspension 0.5 milliGRAM(s) Inhalation every 12 hours  chlorhexidine 0.12% Liquid 15 milliLiter(s) Oral Mucosa <User Schedule>  chlorhexidine 2% Cloths 1 Application(s) Topical daily  enoxaparin Injectable 40 milliGRAM(s) SubCutaneous daily  fat emulsion (Fish Oil and Plant Based) 20% Infusion 16.7 mL/Hr IV Continuous <Continuous>  imipenem/cilastatin  IVPB 500 milliGRAM(s) IV Intermittent every 6 hours  insulin lispro (HumaLOG) corrective regimen sliding scale   SubCutaneous every 4 hours  iron sucrose IVPB 200 milliGRAM(s) IV Intermittent every 24 hours  metoprolol tartrate Injectable 2.5 milliGRAM(s) IV Push every 6 hours  pantoprazole  Injectable 40 milliGRAM(s) IV Push every 24 hours  Parenteral Nutrition - Adult 1 Each TPN Continuous <Continuous>      PRN INPATIENT MEDICATION  HYDROmorphone  Injectable 0.5 milliGRAM(s) IV Push every 3 hours PRN        VITALS/PHYSICAL EXAM  --------------------------------------------------------------------------------  T(C): 36.7 (07-10-19 @ 07:00), Max: 37.5 (07-10-19 @ 03:00)  HR: 116 (07-10-19 @ 09:00) (98 - 129)  BP: 90/54 (07-10-19 @ 09:00) (83/46 - 141/67)  RR: 28 (07-10-19 @ 09:00) (19 - 36)  SpO2: 100% (07-10-19 @ 09:00) (88% - 100%)  Wt(kg): --  Height (cm): 160.02 (07-09-19 @ 11:20)  Weight (kg): 83.2 (07-09-19 @ 11:20)  BMI (kg/m2): 32.5 (07-09-19 @ 11:20)  BSA (m2): 1.86 (07-09-19 @ 11:20)      07-09-19 @ 07:01  -  07-10-19 @ 07:00  --------------------------------------------------------  IN: 2779.8 mL / OUT: 3275 mL / NET: -495.2 mL    07-10-19 @ 07:01  -  07-10-19 @ 09:06  --------------------------------------------------------  IN: 166 mL / OUT: 85 mL / NET: 81 mL      Physical Exam:    	Gen: Guarded, but stable, intubated/ sedated  	HEENT: NC/AT, PERRL, mucosa moist, supple neck, clear oropharynx  	Chest: clear, intubated   	GI: +softly distended, nontender, no BS                  (+)Ostomy pink & viable, (+)effulence noted                  ABthera intact w/ good seal              :  (+)Ching              MSK/Vascular: Passive ROM x4,  no clubbing, cyanosis, nor edema  	Neuro: unable to assess, sedated  	Skin: Warm, good turgor, without rashes        LABS/ CULTURES/ RADIOLOGY:              9.2    31.7  >-----------<  143      [07-10-19 @ 01:53]              26.6     139  |  98  |  49  ----------------------------<  160      [07-10-19 @ 01:53]  4.5   |  30  |  1.06        Ca     8.1     [07-10-19 @ 01:53]      Mg     2.0     [07-10-19 @ 01:53]      Phos  5.2     [07-10-19 @ 01:53]    Triglycerides, Serum: 119 mg/dL (07.08.19 @ 04:24)    Prealbumin, Serum: 5 mg/dL (07.04.19 @ 22:45)      CAPILLARY BLOOD GLUCOSE      POCT Blood Glucose.: 212 mg/dL (10 Jul 2019 05:26)  POCT Blood Glucose.: 156 mg/dL (10 Jul 2019 01:31)  POCT Blood Glucose.: 115 mg/dL (10 Jul 2019 00:06)  POCT Blood Glucose.: 95 mg/dL (09 Jul 2019 23:01)  POCT Blood Glucose.: 125 mg/dL (09 Jul 2019 21:59)  POCT Blood Glucose.: 106 mg/dL (09 Jul 2019 21:01)  POCT Blood Glucose.: 113 mg/dL (09 Jul 2019 20:02)  POCT Blood Glucose.: 118 mg/dL (09 Jul 2019 18:56)  POCT Blood Glucose.: 172 mg/dL (09 Jul 2019 17:53)  POCT Blood Glucose.: 185 mg/dL (09 Jul 2019 17:08)  POCT Blood Glucose.: 225 mg/dL (09 Jul 2019 17:06)  POCT Blood Glucose.: 225 mg/dL (09 Jul 2019 16:11)  POCT Blood Glucose.: 225 mg/dL (09 Jul 2019 15:28)  POCT Blood Glucose.: 260 mg/dL (09 Jul 2019 14:03)  POCT Blood Glucose.: 135 mg/dL (09 Jul 2019 10:29)    Prealbumin, Serum: 5 mg/dL (07-04-19 @ 22:45)        ASSESSMENT/PLAN:  74y Female      1. Protein calorie malnutrition being optimized with TPN:  AA  [ ] gm. CHO [ ] gm. Lipids [ ] gm.  2.  Hyperglycemia managed with: [ ] units of regular insulin    3. Check fluid balance daily.  Strict I/O  [ ] [ ]   4.  Daily BMP, Ionized Calcium, Magnesium and Phosphorous   5.  Triglycerides x 3 days at initiation of TPN then monthly [ ]     TPN pager [phone], spectra [phone]  d/w Dr. Huggins and Dr. DION Cross [facility] NUTRITION SUPPORT / TPN -- FOLLOW UP NOTE  --------------------------------------------------------------------------------    24 hour events/subjective:    -Went to OR for abdominal washout and abthera vac placement. Abdominal wall flaps made, however still unable to close. Cultures sent from the OR as purulent fluid present in abdomen. Pt left intubated  -Left intubated overnight  -Abx changed to imipenem  -Continued diuresis w/ lasix and metalozone  -Tachycardic to 120s overnight  -Increased dose of lopressor to 2.5 q6h  - Continue to monitor Glucose - no need for Insulin gtt post op - pt required on some RISS coverage    Diet:  Diet, NPO:   Except Medications (07-09-19 @ 14:09)      Appetite: [ x ]Poor [  ]Adequate [  ]Good  Caloric intake:  [   x]  Adequate   [   ] Inadequate    ROS:  pt unable to offer    ALLERGIES & MEDICATIONS  --------------------------------------------------------------------------------  ALLERGIES    Coreg (Other)  digoxin (Other; Short breath (Mild to Mod))  Lopressor (Short breath)  penicillins (Hives)      INTOLERANCES  metoprolol (Other)  Solu-Medrol (Other (Mild to Mod))      STANDING INPATIENT MEDICATIONS    acetaminophen  IVPB .. 1000 milliGRAM(s) IV Intermittent once  acetaminophen  IVPB .. 1000 milliGRAM(s) IV Intermittent once  ALBUTerol/ipratropium for Nebulization 3 milliLiter(s) Nebulizer every 6 hours  buDESOnide    Inhalation Suspension 0.5 milliGRAM(s) Inhalation every 12 hours  chlorhexidine 0.12% Liquid 15 milliLiter(s) Oral Mucosa <User Schedule>  chlorhexidine 2% Cloths 1 Application(s) Topical daily  enoxaparin Injectable 40 milliGRAM(s) SubCutaneous daily  fat emulsion (Fish Oil and Plant Based) 20% Infusion 16.7 mL/Hr IV Continuous <Continuous>  imipenem/cilastatin  IVPB 500 milliGRAM(s) IV Intermittent every 6 hours  insulin lispro (HumaLOG) corrective regimen sliding scale   SubCutaneous every 4 hours  iron sucrose IVPB 200 milliGRAM(s) IV Intermittent every 24 hours  metoprolol tartrate Injectable 2.5 milliGRAM(s) IV Push every 6 hours  pantoprazole  Injectable 40 milliGRAM(s) IV Push every 24 hours  Parenteral Nutrition - Adult 1 Each TPN Continuous <Continuous>      PRN INPATIENT MEDICATION  HYDROmorphone  Injectable 0.5 milliGRAM(s) IV Push every 3 hours PRN        VITALS/PHYSICAL EXAM  --------------------------------------------------------------------------------  T(C): 36.7 (07-10-19 @ 07:00), Max: 37.5 (07-10-19 @ 03:00)  HR: 116 (07-10-19 @ 09:00) (98 - 129)  BP: 90/54 (07-10-19 @ 09:00) (83/46 - 141/67)  RR: 28 (07-10-19 @ 09:00) (19 - 36)  SpO2: 100% (07-10-19 @ 09:00) (88% - 100%)  Wt(kg): --  Height (cm): 160.02 (07-09-19 @ 11:20)  Weight (kg): 83.2 (07-09-19 @ 11:20)  BMI (kg/m2): 32.5 (07-09-19 @ 11:20)  BSA (m2): 1.86 (07-09-19 @ 11:20)      07-09-19 @ 07:01  -  07-10-19 @ 07:00  --------------------------------------------------------  IN: 2779.8 mL / OUT: 3275 mL / NET: -495.2 mL    07-10-19 @ 07:01  -  07-10-19 @ 09:06  --------------------------------------------------------  IN: 166 mL / OUT: 85 mL / NET: 81 mL      Physical Exam:    	Gen: Guarded, but stable, intubated/ sedated  	HEENT: NC/AT, PERRL, mucosa moist, supple neck, clear oropharynx  	Chest: clear, intubated   	GI: +softly distended, nontender, no BS                  (+)Ostomy pink & viable, effluence noted                  ABthera intact w/ good seal              :  (+)Ching              MSK/Vascular: Passive ROM x4,  no clubbing, cyanosis, nor edema  	Neuro: unable to assess, sedated  	Skin: Warm, good turgor, without rashes        LABS/ CULTURES/ RADIOLOGY:              9.2    31.7  >-----------<  143      [07-10-19 @ 01:53]              26.6     139  |  98  |  49  ----------------------------<  160      [07-10-19 @ 01:53]  4.5   |  30  |  1.06        Ca     8.1     [07-10-19 @ 01:53]      Mg     2.0     [07-10-19 @ 01:53]      Phos  5.2     [07-10-19 @ 01:53]    Triglycerides, Serum: 119 mg/dL (07.08.19 @ 04:24)    Prealbumin, Serum: 5 mg/dL (07.04.19 @ 22:45)      CAPILLARY BLOOD GLUCOSE    POCT Blood Glucose.: 212 mg/dL (10 Jul 2019 05:26)  POCT Blood Glucose.: 156 mg/dL (10 Jul 2019 01:31)  POCT Blood Glucose.: 115 mg/dL (10 Jul 2019 00:06)  POCT Blood Glucose.: 95 mg/dL (09 Jul 2019 23:01)  POCT Blood Glucose.: 125 mg/dL (09 Jul 2019 21:59)  POCT Blood Glucose.: 106 mg/dL (09 Jul 2019 21:01)  POCT Blood Glucose.: 113 mg/dL (09 Jul 2019 20:02)  POCT Blood Glucose.: 118 mg/dL (09 Jul 2019 18:56)  POCT Blood Glucose.: 172 mg/dL (09 Jul 2019 17:53)  POCT Blood Glucose.: 185 mg/dL (09 Jul 2019 17:08)  POCT Blood Glucose.: 225 mg/dL (09 Jul 2019 17:06)  POCT Blood Glucose.: 225 mg/dL (09 Jul 2019 16:11)  POCT Blood Glucose.: 225 mg/dL (09 Jul 2019 15:28)  POCT Blood Glucose.: 260 mg/dL (09 Jul 2019 14:03)  POCT Blood Glucose.: 135 mg/dL (09 Jul 2019 10:29)    Prealbumin, Serum: 5 mg/dL (07-04-19 @ 22:45)    < from: Xray Chest 1 View- PORTABLE-Routine (07.10.19 @ 06:44) >  COMPARISON: Chest x-ray from 7/9/2019    FINDINGS:    Right upper extremity PICC line with tip in the SVC.  ET tube with lon.  Enteric tube seen coursing below the diaphragm, tip outside field of   imaging.  Left chest wall AICD.    The lungs appear to be clear.  There is no pneumothorax. Small right pleural effusion. Incomplete   imaging of the left costophrenic angle.  The cardiomediastinal silhouette cannot be adequately assessed on this   projection.    IMPRESSION:   Clear lungs.    < end of copied text > [facility] NUTRITION SUPPORT / TPN -- FOLLOW UP NOTE  --------------------------------------------------------------------------------    24 hour events/subjective:    -Went to OR for abdominal washout and abthera vac placement. Abdominal wall flaps made, however still unable to close. Cultures sent from the OR as purulent fluid present in abdomen. Pt left intubated  -Left intubated overnight  -Abx changed to imipenem  -Continued diuresis w/ lasix and metalozone  -Tachycardic to 120s overnight  -Increased dose of lopressor to 2.5 q6h  - Continue to monitor Glucose - no need for Insulin gtt post op - pt required on some RISS coverage    Diet:  Diet, NPO:   Except Medications (07-09-19 @ 14:09)      Appetite: [ x ]Poor [  ]Adequate [  ]Good  Caloric intake:  [   x]  Adequate   [   ] Inadequate    ROS:  pt unable to offer    ALLERGIES & MEDICATIONS  --------------------------------------------------------------------------------  ALLERGIES    Coreg (Other)  digoxin (Other; Short breath (Mild to Mod))  Lopressor (Short breath)  penicillins (Hives)      INTOLERANCES  metoprolol (Other)  Solu-Medrol (Other (Mild to Mod))      STANDING INPATIENT MEDICATIONS    acetaminophen  IVPB .. 1000 milliGRAM(s) IV Intermittent once  acetaminophen  IVPB .. 1000 milliGRAM(s) IV Intermittent once  ALBUTerol/ipratropium for Nebulization 3 milliLiter(s) Nebulizer every 6 hours  buDESOnide    Inhalation Suspension 0.5 milliGRAM(s) Inhalation every 12 hours  chlorhexidine 0.12% Liquid 15 milliLiter(s) Oral Mucosa <User Schedule>  chlorhexidine 2% Cloths 1 Application(s) Topical daily  enoxaparin Injectable 40 milliGRAM(s) SubCutaneous daily  fat emulsion (Fish Oil and Plant Based) 20% Infusion 16.7 mL/Hr IV Continuous <Continuous>  imipenem/cilastatin  IVPB 500 milliGRAM(s) IV Intermittent every 6 hours  insulin lispro (HumaLOG) corrective regimen sliding scale   SubCutaneous every 4 hours  iron sucrose IVPB 200 milliGRAM(s) IV Intermittent every 24 hours  metoprolol tartrate Injectable 2.5 milliGRAM(s) IV Push every 6 hours  pantoprazole  Injectable 40 milliGRAM(s) IV Push every 24 hours  Parenteral Nutrition - Adult 1 Each TPN Continuous <Continuous>      PRN INPATIENT MEDICATION  HYDROmorphone  Injectable 0.5 milliGRAM(s) IV Push every 3 hours PRN        VITALS/PHYSICAL EXAM  --------------------------------------------------------------------------------  T(C): 36.7 (07-10-19 @ 07:00), Max: 37.5 (07-10-19 @ 03:00)  HR: 116 (07-10-19 @ 09:00) (98 - 129)  BP: 90/54 (07-10-19 @ 09:00) (83/46 - 141/67)  RR: 28 (07-10-19 @ 09:00) (19 - 36)  SpO2: 100% (07-10-19 @ 09:00) (88% - 100%)  Wt(kg): --  Height (cm): 160.02 (07-09-19 @ 11:20)  Weight (kg): 83.2 (07-09-19 @ 11:20)  BMI (kg/m2): 32.5 (07-09-19 @ 11:20)  BSA (m2): 1.86 (07-09-19 @ 11:20)      07-09-19 @ 07:01  -  07-10-19 @ 07:00  --------------------------------------------------------  IN: 2779.8 mL / OUT: 3275 mL / NET: -495.2 mL    07-10-19 @ 07:01  -  07-10-19 @ 09:06  --------------------------------------------------------  IN: 166 mL / OUT: 85 mL / NET: 81 mL      Physical Exam:    	Gen: Guarded, but stable, intubated/ alert, NGT  	HEENT: NC/AT, PERRL, mucosa moist, supple neck, clear oropharynx  	Chest: clear, intubated   	GI: +softly distended, nontender, no BS                  (+)Ostomy pink & viable, effluence noted                  ABthera intact w/ good seal              :  (+)Ching              MSK/Vascular: Passive ROM x4,  no clubbing, cyanosis, nor edema  	Neuro: unable to assess, sedated  	Skin: Warm, good turgor, without rashes        LABS/ CULTURES/ RADIOLOGY:              9.2    31.7  >-----------<  143      [07-10-19 @ 01:53]              26.6     139  |  98  |  49  ----------------------------<  160      [07-10-19 @ 01:53]  4.5   |  30  |  1.06        Ca     8.1     [07-10-19 @ 01:53]      Mg     2.0     [07-10-19 @ 01:53]      Phos  5.2     [07-10-19 @ 01:53]    Triglycerides, Serum: 119 mg/dL (07.08.19 @ 04:24)    Prealbumin, Serum: 5 mg/dL (07.04.19 @ 22:45)      CAPILLARY BLOOD GLUCOSE    POCT Blood Glucose.: 212 mg/dL (10 Jul 2019 05:26)  POCT Blood Glucose.: 156 mg/dL (10 Jul 2019 01:31)  POCT Blood Glucose.: 115 mg/dL (10 Jul 2019 00:06)  POCT Blood Glucose.: 95 mg/dL (09 Jul 2019 23:01)  POCT Blood Glucose.: 125 mg/dL (09 Jul 2019 21:59)  POCT Blood Glucose.: 106 mg/dL (09 Jul 2019 21:01)  POCT Blood Glucose.: 113 mg/dL (09 Jul 2019 20:02)  POCT Blood Glucose.: 118 mg/dL (09 Jul 2019 18:56)  POCT Blood Glucose.: 172 mg/dL (09 Jul 2019 17:53)  POCT Blood Glucose.: 185 mg/dL (09 Jul 2019 17:08)  POCT Blood Glucose.: 225 mg/dL (09 Jul 2019 17:06)  POCT Blood Glucose.: 225 mg/dL (09 Jul 2019 16:11)  POCT Blood Glucose.: 225 mg/dL (09 Jul 2019 15:28)  POCT Blood Glucose.: 260 mg/dL (09 Jul 2019 14:03)  POCT Blood Glucose.: 135 mg/dL (09 Jul 2019 10:29)    Prealbumin, Serum: 5 mg/dL (07-04-19 @ 22:45)    < from: Xray Chest 1 View- PORTABLE-Routine (07.10.19 @ 06:44) >  COMPARISON: Chest x-ray from 7/9/2019    FINDINGS:    Right upper extremity PICC line with tip in the SVC.  ET tube with lon.  Enteric tube seen coursing below the diaphragm, tip outside field of   imaging.  Left chest wall AICD.    The lungs appear to be clear.  There is no pneumothorax. Small right pleural effusion. Incomplete   imaging of the left costophrenic angle.  The cardiomediastinal silhouette cannot be adequately assessed on this   projection.    IMPRESSION:   Clear lungs.    < end of copied text >

## 2019-07-10 NOTE — PROGRESS NOTE ADULT - SUBJECTIVE AND OBJECTIVE BOX
Subjective:  - Comfortable in bed, intubated    Medications:  acetaminophen  IVPB .. 1000 milliGRAM(s) IV Intermittent once  ALBUTerol/ipratropium for Nebulization 3 milliLiter(s) Nebulizer every 6 hours  benzocaine 15 mG/menthol 3.6 mG Lozenge 1 Lozenge Oral three times a day PRN  buDESOnide    Inhalation Suspension 0.5 milliGRAM(s) Inhalation every 12 hours  chlorhexidine 0.12% Liquid 15 milliLiter(s) Oral Mucosa <User Schedule>  chlorhexidine 2% Cloths 1 Application(s) Topical daily  enoxaparin Injectable 40 milliGRAM(s) SubCutaneous daily  fat emulsion (Fish Oil and Plant Based) 20% Infusion 16.7 mL/Hr IV Continuous <Continuous>  HYDROmorphone  Injectable 0.5 milliGRAM(s) IV Push every 3 hours PRN  imipenem/cilastatin  IVPB 500 milliGRAM(s) IV Intermittent every 6 hours  insulin lispro (HumaLOG) corrective regimen sliding scale   SubCutaneous every 4 hours  iron sucrose IVPB 200 milliGRAM(s) IV Intermittent every 24 hours  metoprolol tartrate Injectable 2.5 milliGRAM(s) IV Push every 6 hours  pantoprazole  Injectable 40 milliGRAM(s) IV Push every 24 hours  Parenteral Nutrition - Adult 1 Each TPN Continuous <Continuous>  Parenteral Nutrition - Adult 1 Each TPN Continuous <Continuous>      Physical Exam:    Vitals:  Vital Signs Last 24 Hours  T(C): 36.9 (07-10-19 @ 11:00), Max: 37.5 (07-10-19 @ 03:00)  HR: 120 (07-10-19 @ 14:03) (101 - 129)  BP: 118/58 (07-10-19 @ 14:00) (83/46 - 141/67)  RR: 32 (07-10-19 @ 14:00) (16 - 36)  SpO2: 99% (07-10-19 @ 14:03) (88% - 100%)    Weight in k.8 (07-10 @ 04:00)    I&O's Summary    2019 07:01  -  10 Jul 2019 07:00  --------------------------------------------------------  IN: 2779.8 mL / OUT: 3275 mL / NET: -495.2 mL    10 Jul 2019 07:01  -  10 Jul 2019 14:07  --------------------------------------------------------  IN: 781 mL / OUT: 700 mL / NET: 81 mL    Tele: SR/ 100-120s    Appearance: intubated, alert  HEENT: PERRL  Neck: JVP mild-moderately elevated  Cardiovascular: Normal S1 S2, No murmurs/rubs/gallops  Respiratory: Clear to auscultation bilaterally  Gastrointestinal: abdom vac dressing in place; anasarca	  Skin: No cyanosis	  Neurologic: Non-focal  Extremities: 2+ BUE edema, 1+ BLE edema   Psychiatry: alert, calm    Labs:                        9.2    31.7  )-----------( 143      ( 10 Jul 2019 01:53 )             26.6     07-10    139  |  98  |  49<H>  ----------------------------<  160<H>  4.5   |  30  |  1.06    Ca    8.1<L>      10 Jul 2019 01:53  Phos  5.2     07-10  Mg     2.0     07-10

## 2019-07-10 NOTE — PROGRESS NOTE ADULT - ATTENDING COMMENTS
S/P RTOR, significant purulence  1.  Protein calorie malnutrition, severe--continue current formulation optimized for protein, calories  2.  Hyperglycemia--may need more subsequent to OR but increased insulin  3.  HyperNa--volume,diuretic optimization  4.  HyperPO4--adjust PO4

## 2019-07-10 NOTE — PROGRESS NOTE ADULT - ATTENDING COMMENTS
I have seen and evaluated the patient and discussed the relevant clinical findings and plan with the surgical housestaff and fellow.  Agree with the above documentation with addenda as noted.     Clinically stable but WBC up to 31  Abd soft, vac in place  Ostomy pink, mildly edematous, no stool or air in bag  Abx changed to imipenem; will consult ID and f/u OR cultures  Pan-culture today  Hold diuretics  Cont TPN  NPO/NGT    Clark Alvarado MD

## 2019-07-10 NOTE — PROGRESS NOTE ADULT - ASSESSMENT
73 yo F with PMHx of HFrEF (EF 35% now improved to 45%) s/p CRT-D, asthma (not on BB d/t severity), HTN, HLD, atrial fibrillation on eliquis, who p/w severe, sudden onset L sided abd pain, found to have proximal descending colon/splenic flexure acute diverticulitis with evidence of contained perforation, now s/p exploratory laparotomy, left hemicolectomy, end colostomy, and ABThera VAC placement on 7/1. Transferred back to the SICU immediately postop requiring high doses of pressors, now off and being treated with broad spectrum abx. S/P abdominal washout x3 (last 7/9). Now off pressors but markedly edematous with slightly elevated filling pressures but predominantly from low albumin. Renal function is stable.

## 2019-07-10 NOTE — CONSULT NOTE ADULT - ASSESSMENT
74 year old female PMHx of HFrEF s/p AICD, asthma, HTN, HLD, atrial fibrillation on eliquis who presented to [facility] on 6/30 with diverticulitis at splenic flexure with perforation on repeat scan on 7/1.  On 7/2 patient underwent Exploratory laparotomy, partial colectomy, end colostomy, and placement of temporary abdominal VAC dressing. Intraoperatively was noted of having perforation of distal transverse colon with feculent peritonitis. On 7/3 patient underwent Reopening of abdominal wound and an abdominal washout. On 7/9 patient RTOR for Abthera Vac placement and was found to have viable Edematous bowel and minimal pus-like fluid in pelvis which was sent for culture. On 7/10 hypotensive to SBP in the 80's and broadened to Imipenem. Wound cultures from 7/2/19 with E. coli, AHS, Strep mitis and Enterococcus faecalis (amp susceptible).    Ciprofloxacin 6/30 - 7/1  Aztreonam 7/2 - 7/4  Flagyl 6/30 - 7/9  Ceftriaxone 7/4 - 7/9  Imipenem 7/10 -->>    Overall, feculent peritonitis and resolved septic shock secondary to perforated diverticulitis. Agree with broadening to imipenem for now while awaiting cultures from 7/9 washout. Imipenem would also offer some enterococcal activity which was not offered by cipro, aztreonam or ceftriaxone. Would follow the repeat blood cultures    --Continue imipenem 500 mg IV Q6H  --F/U on surgical findings if patient is to RTOR  --F/U on 7/9 surgical cultures  --F/U on 7/10 blood cultures     I will continue to follow. Please feel free to contact me with any further questions.    Rex Goode M.D.  [facility] Division of Infectious Disease  8AM-5PM: Pager Number [phone]  After Hours: Please contact the Infectious Diseases Office at [phone]

## 2019-07-10 NOTE — CONSULT NOTE ADULT - SUBJECTIVE AND OBJECTIVE BOX
Patient is a 74y old  Female who presents with a chief complaint of SOB, abdom pain (2019 17:24)      HPI:  74 F [place] with PMHx of HFrEF s/p AICD, asthma, HTN, HLD, atrial fibrillation on eliquis p/w severe, sudden onset L sided abd pain that started this AM. Patient had just finished her meal when she developed left sided abdominal pain. She reports nausea but no vomiting. She denies any fevers, chills, diarrhea or hematochezia. Patient denies having abdominal pain like this in the past.   Upon arrival to ED, patient with stable vitals. She received 4mg of IV morphine for pain. She subsequently went for CT of abdomen/pelvis with IV contrast to evaluate for cause of abdominal pain. After CT, she received additional morphine for pain. Shortly after that, patient started to have palpitations and shortess of breath. She started to have wheezing and became hypoxic. Bedside US by ED was limited 2/2 to habitus, but it revealed a few B lines. She received 80mg total of IV lasix, 125 of solumedrol, duonebs x2 and narcan.   MICU consulted for hypoxic respiratory failure. Upon evaluation, patient's VS , RR 30, O2 sat 100% on 100% FiO2 and /72. Patient's FiO2 decreased to 40% and patient with O2 sat of 100%. Patient admitted to MICU for management of respiratory and heart failure. (2019 20:37)     presented to [facility] on 19. Febrile to 100.8. CT A/P on  with proximal descending colon/splenic flexure diverticulitis.     Ciprofloxacin  -   Aztreonam / -   Flagyl  -   Ceftriaxone  -   Imipenem 7/10 -->>    prior hospital charts reviewed [  ]  primary team notes reviewed [  ]  other consultant notes reviewed [  ]    PAST MEDICAL & SURGICAL HISTORY:  Refusal of blood transfusions as patient is Gnosticism  Patient is Gnosticism  Vertigo  Atrial flutter  Diverticulitis  Cardiomyopathy  Kidney stone  Cardiac Pacemaker  HTN - Hypertension  Gout  Diabetes  Congestive Heart Failure  Asthma  S/P cholecystectomy  AICD (Automatic Cardioverter/Defibrillator) Present: inserted in Aug, 2008. Due for battery change in 1 month. ( [place]) . Inserted by Dr Duffy      Allergies  Coreg (Other)  digoxin (Other; Short breath (Mild to Mod))  Lopressor (Short breath)  penicillins (Hives)    MEDICATIONS  (STANDING):    ANTIMICROBIALS:    imipenem/cilastatin  IVPB 500 every 6 hours    OTHER MEDS: MEDICATIONS  (STANDING):  acetaminophen  IVPB .. 1000 once  ALBUTerol/ipratropium for Nebulization 3 every 6 hours  buDESOnide    Inhalation Suspension 0.5 every 12 hours  enoxaparin Injectable 40 daily  HYDROmorphone  Injectable 0.5 every 3 hours PRN  insulin lispro (HumaLOG) corrective regimen sliding scale  every 4 hours  metoprolol tartrate Injectable 2.5 every 6 hours  pantoprazole  Injectable 40 every 24 hours    SOCIAL HISTORY:   hx smoking  non-smoker    FAMILY HISTORY:  Family history of brain tumor    REVIEW OF SYSTEMS  [  ] ROS unobtainable because:    [  ] All other systems negative except as noted below:	    Constitutional:  [ ] fever [ ] chills  [ ] weight loss  [ ] weakness  Skin:  [ ] rash [ ] phlebitis	  Eyes: [ ] icterus [ ] pain  [ ] discharge	  ENMT: [ ] sore throat  [ ] thrush [ ] ulcers [ ] exudates  Respiratory: [ ] dyspnea [ ] hemoptysis [ ] cough [ ] sputum	  Cardiovascular:  [ ] chest pain [ ] palpitations [ ] edema	  Gastrointestinal:  [ ] nausea [ ] vomiting [ ] diarrhea [ ] constipation [ ] pain	  Genitourinary:  [ ] dysuria [ ] frequency [ ] hematuria [ ] discharge [ ] flank pain  [ ] incontinence  Musculoskeletal:  [ ] myalgias [ ] arthralgias [ ] arthritis  [ ] back pain  Neurological:  [ ] headache [ ] seizures  [ ] confusion/altered mental status  Psychiatric:  [ ] anxiety [ ] depression	  Hematology/Lymphatics:  [ ] lymphadenopathy  Endocrine:  [ ] adrenal [ ] thyroid  Allergic/Immunologic:	 [ ] transplant [ ] seasonal    Vital Signs Last 24 Hrs  T(F): 98.5 (07-10-19 @ 11:00), Max: 99.5 (07-10-19 @ 03:00)  Vital Signs Last 24 Hrs  HR: 114 (07-10-19 @ 13:00) (100 - 129)  BP: 110/55 (07-10-19 @ 13:00) (83/46 - 141/67)  RR: 16 (07-10-19 @ 13:00)  SpO2: 100% (07-10-19 @ 13:00) (88% - 100%)  Wt(kg): --    PHYSICAL EXAMINATION:  General: Alert and Awake, NAD  HEENT: PERRL, EOMI, No subconjunctival hemorrhages, Oropharynx Clear, MMM  Neck: Supple, No SARAHI  Cardiac: RRR, No M/R/G  Resp: CTAB, No Wh/Rh/Ra  Abdomen: NBS, NT/ND, No HSM, No rigidity or guarding  MSK: No LE edema. No stigmata of IE. No evidence of phlebitis. No evidence of synovitis.  : No starr  Skin: No rashes or lesions. Skin is warm and dry to the touch.   Neuro: Alert and Awake. CN 2-12 Grossly intact. Moves all four extremities spontaneously.  Psych: Calm, Pleasant, Cooperative                          9.2    31.7  )-----------( 143      ( 10 Jul 2019 01:53 )             26.6     07-10    139  |  98  |  49<H>  ----------------------------<  160<H>  4.5   |  30  |  1.06    Ca    8.1<L>      10 Jul 2019 01:53  Phos  5.2     07-10  Mg     2.0     07-10      Urinalysis Basic - ( 10 Jul 2019 08:47 )    Color: Yellow / Appearance: Clear / S.024 / pH: x  Gluc: x / Ketone: Trace  / Bili: Negative / Urobili: Negative   Blood: x / Protein: Trace / Nitrite: Negative   Leuk Esterase: Small / RBC: 8 /hpf / WBC 6 /HPF   Sq Epi: x / Non Sq Epi: 2 /hpf / Bacteria: Negative    MICROBIOLOGY:  Culture - Body Fluid with Gram Stain (collected 2019 17:54)  Source: .Body Fluid abdominal fluid  Gram Stain (2019 20:48):    polymorphonuclear leukocytes seen    No organisms seen    by cytocentrifuge    Culture - Body Fluid with Gram Stain (collected 2019 17:46)  Source: .Body Fluid abdominal fluid  Gram Stain (2019 20:48):    polymorphonuclear leukocytes seen    No organisms seen    by cytocentrifuge    RADIOLOGY:  imaging below personally reviewed      EXAM:  CT ABDOMEN AND PELVIS IC                        PROCEDURE DATE:  2019    Findings suggestive of proximal descending colon/splenic flexure diverticulitis.   A 4 cm low-attenuation structure is present in the region of the adnexa. It is unclear whether this represents a loculated fluid collection and/or an ovarian cyst. Further evaluation with pelvic ultrasound is recommended.    EXAM:  CT ABDOMEN AND PELVIS                        PROCEDURE DATE:  2019    Acute diverticulitis at the splenic flexure with evidence of contained perforation.   No drainable regional collection.  Increased ascites in the deep pelvis.  Retention of contrast within the kidneys from CT 1 day biliary suggestive of acute tubular necrosis.    EXAM:  XR CHEST PORTABLE ROUTINE 1V                        PROCEDURE DATE:  07/10/2019    Clear lungs. Patient is a 74y old  Female who presents with a chief complaint of SOB, abdom pain (2019 17:24)      HPI:    74 year old female PMHx of HFrEF s/p AICD, asthma, HTN, HLD, atrial fibrillation on eliquis p/w severe, sudden onset L sided abd pain that started on the AM of admission. Patient had just finished a meal when she developed left sided abdominal pain. She reports nausea but no vomiting. She denies any fevers, chills, diarrhea or hematochezia. Patient denies having abdominal pain like this in the past. She presented to [facility] on 19. Febrile to 100.8. CT A/P on  with proximal descending colon/splenic flexure diverticulitis. After admission was persistently tachycardic with BPs as low as 76/50, MAPs in low 60s. She was bolused with 500cc of crystalloid without improvement in blood pressure. She was then started on phenylephrine. Her lactate was uptrending from 2 to 2.6 to 3 on the arterial blood gas. A repeat CT scan showed "Acute diverticulitis at the splenic flexure with evidence of contained perforation. On  patient underwent Exploratory laparotomy, partial colectomy, end colostomy, and placement of temporary abdominal VAC dressing. Intraoperatively was noted of having perforation of distal transverse colon with feculent peritonitis. On 7/3 patient underwent Reopening of abdominal wound and an abdominal washout. On  patient RTOR for Abthera Vac placement and was found to have viable Edematous bowel and minimal pus-like fluid in pelvis which was sent for culture. On 7/10 hypotensive to SBP in the 80's.     Ciprofloxacin  -   Aztreonam  -   Flagyl  -   Ceftriaxone  -   Imipenem 7/10 -->>    prior hospital charts reviewed [  ]  primary team notes reviewed [  ]  other consultant notes reviewed [  ]    PAST MEDICAL & SURGICAL HISTORY:  Refusal of blood transfusions as patient is Restoration  Patient is Restoration  Vertigo  Atrial flutter  Diverticulitis  Cardiomyopathy  Kidney stone  Cardiac Pacemaker  HTN - Hypertension  Gout  Diabetes  Congestive Heart Failure  Asthma  S/P cholecystectomy  AICD (Automatic Cardioverter/Defibrillator) Present: inserted in Aug, 2008. Due for battery change in 1 month. ( [place]) . Inserted by Dr Duffy      Allergies  Coreg (Other)  digoxin (Other; Short breath (Mild to Mod))  Lopressor (Short breath)  penicillins (Hives)    MEDICATIONS  (STANDING):    ANTIMICROBIALS:    imipenem/cilastatin  IVPB 500 every 6 hours    OTHER MEDS: MEDICATIONS  (STANDING):  acetaminophen  IVPB .. 1000 once  ALBUTerol/ipratropium for Nebulization 3 every 6 hours  buDESOnide    Inhalation Suspension 0.5 every 12 hours  enoxaparin Injectable 40 daily  HYDROmorphone  Injectable 0.5 every 3 hours PRN  insulin lispro (HumaLOG) corrective regimen sliding scale  every 4 hours  metoprolol tartrate Injectable 2.5 every 6 hours  pantoprazole  Injectable 40 every 24 hours    SOCIAL HISTORY:   hx smoking  non-smoker    FAMILY HISTORY:  Family history of brain tumor    REVIEW OF SYSTEMS  [  ] ROS unobtainable because:    [  ] All other systems negative except as noted below:	    Constitutional:  [ ] fever [ ] chills  [ ] weight loss  [ ] weakness  Skin:  [ ] rash [ ] phlebitis	  Eyes: [ ] icterus [ ] pain  [ ] discharge	  ENMT: [ ] sore throat  [ ] thrush [ ] ulcers [ ] exudates  Respiratory: [ ] dyspnea [ ] hemoptysis [ ] cough [ ] sputum	  Cardiovascular:  [ ] chest pain [ ] palpitations [ ] edema	  Gastrointestinal:  [ ] nausea [ ] vomiting [ ] diarrhea [ ] constipation [ ] pain	  Genitourinary:  [ ] dysuria [ ] frequency [ ] hematuria [ ] discharge [ ] flank pain  [ ] incontinence  Musculoskeletal:  [ ] myalgias [ ] arthralgias [ ] arthritis  [ ] back pain  Neurological:  [ ] headache [ ] seizures  [ ] confusion/altered mental status  Psychiatric:  [ ] anxiety [ ] depression	  Hematology/Lymphatics:  [ ] lymphadenopathy  Endocrine:  [ ] adrenal [ ] thyroid  Allergic/Immunologic:	 [ ] transplant [ ] seasonal    Vital Signs Last 24 Hrs  T(F): 98.5 (07-10-19 @ 11:00), Max: 99.5 (07-10-19 @ 03:00)  Vital Signs Last 24 Hrs  HR: 114 (07-10-19 @ 13:00) (100 - 129)  BP: 110/55 (07-10-19 @ 13:00) (83/46 - 141/67)  RR: 16 (07-10-19 @ 13:00)  SpO2: 100% (07-10-19 @ 13:00) (88% - 100%)  Wt(kg): --    PHYSICAL EXAMINATION:  General: Alert and Awake, NAD  HEENT: PERRL, EOMI, No subconjunctival hemorrhages, Oropharynx Clear, MMM  Neck: Supple, No SARAHI  Cardiac: RRR, No M/R/G  Resp: CTAB, No Wh/Rh/Ra  Abdomen: NBS, NT/ND, No HSM, No rigidity or guarding  MSK: No LE edema. No stigmata of IE. No evidence of phlebitis. No evidence of synovitis.  : No starr  Skin: No rashes or lesions. Skin is warm and dry to the touch.   Neuro: Alert and Awake. CN 2-12 Grossly intact. Moves all four extremities spontaneously.  Psych: Calm, Pleasant, Cooperative                          9.2    31.7  )-----------( 143      ( 10 Jul 2019 01:53 )             26.6     07-10    139  |  98  |  49<H>  ----------------------------<  160<H>  4.5   |  30  |  1.06    Ca    8.1<L>      10 Jul 2019 01:53  Phos  5.2     07-10  Mg     2.0     07-10      Urinalysis Basic - ( 10 Jul 2019 08:47 )    Color: Yellow / Appearance: Clear / S.024 / pH: x  Gluc: x / Ketone: Trace  / Bili: Negative / Urobili: Negative   Blood: x / Protein: Trace / Nitrite: Negative   Leuk Esterase: Small / RBC: 8 /hpf / WBC 6 /HPF   Sq Epi: x / Non Sq Epi: 2 /hpf / Bacteria: Negative    MICROBIOLOGY:  Culture - Body Fluid with Gram Stain (collected 2019 17:54)  Source: .Body Fluid abdominal fluid  Gram Stain (2019 20:48):    polymorphonuclear leukocytes seen    No organisms seen    by cytocentrifuge    Culture - Body Fluid with Gram Stain (collected 2019 17:46)  Source: .Body Fluid abdominal fluid  Gram Stain (2019 20:48):    polymorphonuclear leukocytes seen    No organisms seen    by cytocentrifuge    RADIOLOGY:  imaging below personally reviewed      EXAM:  CT ABDOMEN AND PELVIS IC                        PROCEDURE DATE:  2019    Findings suggestive of proximal descending colon/splenic flexure diverticulitis.   A 4 cm low-attenuation structure is present in the region of the adnexa. It is unclear whether this represents a loculated fluid collection and/or an ovarian cyst. Further evaluation with pelvic ultrasound is recommended.    EXAM:  CT ABDOMEN AND PELVIS                        PROCEDURE DATE:  2019    Acute diverticulitis at the splenic flexure with evidence of contained perforation.   No drainable regional collection.  Increased ascites in the deep pelvis.  Retention of contrast within the kidneys from CT 1 day biliary suggestive of acute tubular necrosis.    EXAM:  XR CHEST PORTABLE ROUTINE 1V                        PROCEDURE DATE:  07/10/2019    Clear lungs. Patient is a 74y old  Female who presents with a chief complaint of SOB, abdom pain (2019 17:24)      HPI:    74 year old female PMHx of HFrEF s/p AICD, asthma, HTN, HLD, atrial fibrillation on eliquis p/w severe, sudden onset L sided abd pain that started on the AM of admission. Patient had just finished a meal when she developed left sided abdominal pain. She reports nausea but no vomiting. She denies any fevers, chills, diarrhea or hematochezia. Patient denies having abdominal pain like this in the past. She presented to [facility] on 19. Febrile to 100.8. CT A/P on  with proximal descending colon/splenic flexure diverticulitis. After admission was persistently tachycardic with BPs as low as 76/50, MAPs in low 60s. She was bolused with 500cc of crystalloid without improvement in blood pressure. She was then started on phenylephrine. Her lactate was uptrending from 2 to 2.6 to 3 on the arterial blood gas. A repeat CT scan showed "Acute diverticulitis at the splenic flexure with evidence of contained perforation. On  patient underwent Exploratory laparotomy, partial colectomy, end colostomy, and placement of temporary abdominal VAC dressing. Intraoperatively was noted of having perforation of distal transverse colon with feculent peritonitis. On 7/3 patient underwent Reopening of abdominal wound and an abdominal washout. On  patient RTOR for Abthera Vac placement and was found to have viable Edematous bowel and minimal pus-like fluid in pelvis which was sent for culture. On 7/10 hypotensive to SBP in the 80's.     Ciprofloxacin  -   Aztreonam  -   Flagyl  -   Ceftriaxone  -   Imipenem 7/10 -->>    prior hospital charts reviewed [  ]  primary team notes reviewed [  x]  other consultant notes reviewed [x  ]    PAST MEDICAL & SURGICAL HISTORY:  Refusal of blood transfusions as patient is Rastafarian  Patient is Rastafarian  Vertigo  Atrial flutter  Diverticulitis  Cardiomyopathy  Kidney stone  Cardiac Pacemaker  HTN - Hypertension  Gout  Diabetes  Congestive Heart Failure  Asthma  S/P cholecystectomy  AICD (Automatic Cardioverter/Defibrillator) Present: inserted in Aug, 2008. Due for battery change in 1 month. ( [place]) . Inserted by Dr Duffy      Allergies  Coreg (Other)  digoxin (Other; Short breath (Mild to Mod))  Lopressor (Short breath)  penicillins (Hives)    MEDICATIONS  (STANDING):    ANTIMICROBIALS:    imipenem/cilastatin  IVPB 500 every 6 hours    OTHER MEDS: MEDICATIONS  (STANDING):  acetaminophen  IVPB .. 1000 once  ALBUTerol/ipratropium for Nebulization 3 every 6 hours  buDESOnide    Inhalation Suspension 0.5 every 12 hours  enoxaparin Injectable 40 daily  HYDROmorphone  Injectable 0.5 every 3 hours PRN  insulin lispro (HumaLOG) corrective regimen sliding scale  every 4 hours  metoprolol tartrate Injectable 2.5 every 6 hours  pantoprazole  Injectable 40 every 24 hours    SOCIAL HISTORY: (history obtained from patient's daughter - patient on BiPaP and lethargic). No smoking, etoh use or drug use.     FAMILY HISTORY:  Family history of brain tumor    REVIEW OF SYSTEMS  [  ] ROS unobtainable because:    [ x ] All other systems negative except as noted below:( limited by BiPaP mask and slight lethargy)    Constitutional:  [ ] fever [ ] chills  [ ] weight loss  [ ] weakness  Skin:  [ ] rash [ ] phlebitis	  Eyes: [ ] icterus [ ] pain  [ ] discharge	  ENMT: [ ] sore throat  [ ] thrush [ ] ulcers [ ] exudates  Respiratory: [ ] dyspnea [ ] hemoptysis [ ] cough [ ] sputum	  Cardiovascular:  [ ] chest pain [ ] palpitations [ ] edema	  Gastrointestinal:  [ ] nausea [ ] vomiting [ ] diarrhea [ ] constipation [x ] pain	  Genitourinary:  [ ] dysuria [ ] frequency [ ] hematuria [ ] discharge [ ] flank pain  [ ] incontinence  Musculoskeletal:  [ ] myalgias [ ] arthralgias [ ] arthritis  [ ] back pain  Neurological:  [ ] headache [ ] seizures  [ ] confusion/altered mental status  Psychiatric:  [ ] anxiety [ ] depression	  Hematology/Lymphatics:  [ ] lymphadenopathy  Endocrine:  [ ] adrenal [ ] thyroid  Allergic/Immunologic:	 [ ] transplant [ ] seasonal    Vital Signs Last 24 Hrs  T(F): 98.5 (07-10-19 @ 11:00), Max: 99.5 (07-10-19 @ 03:00)  Vital Signs Last 24 Hrs  HR: 114 (07-10-19 @ 13:00) (100 - 129)  BP: 110/55 (07-10-19 @ 13:00) (83/46 - 141/67)  RR: 16 (07-10-19 @ 13:00)  SpO2: 100% (07-10-19 @ 13:00) (88% - 100%)  Wt(kg): --    PHYSICAL EXAMINATION:  General: Alert and Awake, NAD  HEENT: PERRL, EOMI, No subconjunctival hemorrhages, Oropharynx Clear, MMM  Neck: Supple, No SARAHI  Cardiac: RRR, No M/R/G  Resp: CTAB, No Wh/Rh/Ra  Abdomen: Obese, +RLQ ostomy with minimal stool output. +midline wound vac with serosanguinous drainage, NBS, ND, tender to palpation diffusely but worst in LLQ and LUQ, No HSM, No rigidity or guarding  MSK: No LE edema. No stigmata of IE. No evidence of phlebitis. No evidence of synovitis.  : + starr  Skin: No rashes or lesions. Skin is warm and dry to the touch.   Neuro: Alert and Awake. CN 2-12 Grossly intact. Moves all four extremities spontaneously.  Psych: Calm, Pleasant, Cooperative                          9.2    31.7  )-----------( 143      ( 10 Jul 2019 01:53 )             26.6     07-10    139  |  98  |  49<H>  ----------------------------<  160<H>  4.5   |  30  |  1.06    Ca    8.1<L>      10 Jul 2019 01:53  Phos  5.2     07-10  Mg     2.0     07-10      Urinalysis Basic - ( 10 Jul 2019 08:47 )    Color: Yellow / Appearance: Clear / S.024 / pH: x  Gluc: x / Ketone: Trace  / Bili: Negative / Urobili: Negative   Blood: x / Protein: Trace / Nitrite: Negative   Leuk Esterase: Small / RBC: 8 /hpf / WBC 6 /HPF   Sq Epi: x / Non Sq Epi: 2 /hpf / Bacteria: Negative    MICROBIOLOGY:  Culture - Body Fluid with Gram Stain (collected 2019 17:54)  Source: .Body Fluid abdominal fluid  Gram Stain (2019 20:48):    polymorphonuclear leukocytes seen    No organisms seen    by cytocentrifuge    Culture - Body Fluid with Gram Stain (collected 2019 17:46)  Source: .Body Fluid abdominal fluid  Gram Stain (2019 20:48):    polymorphonuclear leukocytes seen    No organisms seen    by cytocentrifuge    RADIOLOGY:  imaging below personally reviewed      EXAM:  CT ABDOMEN AND PELVIS IC                        PROCEDURE DATE:  2019    Findings suggestive of proximal descending colon/splenic flexure diverticulitis.   A 4 cm low-attenuation structure is present in the region of the adnexa. It is unclear whether this represents a loculated fluid collection and/or an ovarian cyst. Further evaluation with pelvic ultrasound is recommended.    EXAM:  CT ABDOMEN AND PELVIS                        PROCEDURE DATE:  2019    Acute diverticulitis at the splenic flexure with evidence of contained perforation.   No drainable regional collection.  Increased ascites in the deep pelvis.  Retention of contrast within the kidneys from CT 1 day biliary suggestive of acute tubular necrosis.    EXAM:  XR CHEST PORTABLE ROUTINE 1V                        PROCEDURE DATE:  07/10/2019    Clear lungs.

## 2019-07-10 NOTE — PROGRESS NOTE ADULT - PROBLEM SELECTOR PLAN 2
- Management as per SICU/surgical team  - Continue IV abx; cx negative  - agree with TPN given low albumin and inability to feed patient

## 2019-07-10 NOTE — PROGRESS NOTE ADULT - SUBJECTIVE AND OBJECTIVE BOX
HISTORY  74y Female PMHx of HFrEF s/p AICD, asthma, HTN, HLD, atrial fibrillation on eliquis p/w severe, sudden onset L sided abd pain which started yesterday morning after eating. She endorsed nausea at the time but no vomiting. She did have any fevers, chills, diarrhea or hematochezia.      On 6/30, CTAP w/ IV contrast showed proximal descending colon/splenic flexure diverticulitis with small amount of free fluid and 3cm low attenuation structure in the right adnexa ?fluid collection ?ovarian cyst. She developed dyspnea and wheezing after the CT scan and became hypoxic. Bedside US int he ED demonstrated a few B lines. She received 80mg total of IV lasix, 125 of solumedrol, duonebs x2 and narcan and she was admitted to MICU for management of respiratory and heart failure.     Through the next day (7/1), she was persistently tachycardic with BPs as low as 76/50, MAPs in low 60s. She was bolused with 500cc of crystalloid without improvement in blood pressure. She was then started on phenylephrine. Her lactate was uptrending from 2 to 2.6 to 3 on the arterial blood gas. A repeat CT scan showed "Acute diverticulitis at the splenic flexure with evidence of contained perforation. No drainable regional collection. Increased ascites in the deep pelvis." She was transferred to the SICU for further HD monitoring. Antibiotics were switched to Aztreonam and flagyl. Bedside ultrasound showed IV 1.5 minimally collapsable and echo consistent with euvolemic state. Antibiotics again switched to Ceftriaxone and flagyl. OGT switched to NGT. Patient became tachypneic during SBT on 7/6 so she could not be extubated at that time. She was kept intubated for a plan to return to the OR on 7/8.    24 HOUR EVENTS:  -Went to OR for abdominal washout and abthera vac placement. Abdominal wall flaps made, however still unable to close. Cultures sent from the OR as purulent fluid present in abdomen. Pt left intubated  -Left intubated overnight  -Abx changed to imipenem  -Continued diuresis w/ lasix and metalozone  -Tachycardic to 120s overnight  -Increased dose of lopressor to 2.5 q6h    SUBJECTIVE/ROS:  [ ] A ten-point review of systems was otherwise negative except as noted.  [ ] Due to altered mental status/intubation, subjective information were not able to be obtained from the patient. History was obtained, to the extent possible, from review of the chart and collateral sources of information.      NEURO  Exam: intubated, follows commands, off sedation  Meds: acetaminophen    Suspension .. 975 milliGRAM(s) Oral every 6 hours  HYDROmorphone  Injectable 0.5 milliGRAM(s) IV Push every 3 hours PRN pain    [x] Adequacy of sedation and pain control has been assessed and adjusted      RESPIRATORY  RR: 19 (07-10-19 @ 01:00) (19 - 36)  SpO2: 100% (07-10-19 @ 01:00) (88% - 100%)  Wt(kg): --  Exam: unlabored, clear to auscultation bilaterally  Mechanical Ventilation: Mode: AC/ CMV (Assist Control/ Continuous Mandatory Ventilation), RR (machine): 16, RR (patient): 18, TV (machine): 450, FiO2: 40, PEEP: 5, ITime: 1, MAP: 9, PIP: 23    [N/A] Extubation Readiness Assessed  Meds: ALBUTerol/ipratropium for Nebulization 3 milliLiter(s) Nebulizer every 6 hours  buDESOnide    Inhalation Suspension 0.5 milliGRAM(s) Inhalation every 12 hours        CARDIOVASCULAR  HR: 127 (07-10-19 @ 01:00) (86 - 128)  BP: 98/52 (07-10-19 @ 01:00) (87/59 - 141/67)  BP(mean): 71 (07-10-19 @ 01:00) (65 - 93)  ABP: --  ABP(mean): --  Wt(kg): --  CVP(cm H2O): --  VBG - ( 08 Jul 2019 02:25 )  pH: 7.43  /  pCO2: 46    /  pO2: 39    / HCO3: 30    / Base Excess: 5.7   /  SaO2: 70     Lactate: 1.8                Exam: regular rate and rhythm  Cardiac Rhythm: sinus  Perfusion     [x]Adequate   [ ]Inadequate  Mentation   [x]Normal       [ ]Reduced  Extremities  [x]Warm         [ ]Cool  Volume Status [ ]Hypervolemic [x]Euvolemic [ ]Hypovolemic  Meds: metoprolol tartrate Injectable 2.5 milliGRAM(s) IV Push every 6 hours        GI/NUTRITION  Exam: soft, nontender, nondistended, abthera w/ good suction and ss output, ostomy w/o function  Diet: NPO w/ TPN  Meds: pantoprazole  Injectable 40 milliGRAM(s) IV Push every 24 hours      GENITOURINARY  I&O's Detail    07-08 @ 07:01  -  07-09 @ 07:00  --------------------------------------------------------  IN:    Enteral Tube Flush: 520 mL    fat emulsion (Fish Oil and Plant Based) 20% Infusion: 217.1 mL    insulin regular Infusion: 128 mL    Solution: 150 mL    TPN (Total Parenteral Nutrition): 1992 mL  Total IN: 3007.1 mL    OUT:    Nasoenteral Tube: 1050 mL    Ureteral Catheter: 4585 mL    VAC (Vacuum Assisted Closure) System: 700 mL  Total OUT: 6335 mL    Total NET: -3327.9 mL      07-09 @ 07:01  -  07-10 @ 01:39  --------------------------------------------------------  IN:    Enteral Tube Flush: 220 mL    fat emulsion (Fish Oil and Plant Based) 20% Infusion: 150.3 mL    insulin regular Infusion: 33 mL    Solution: 300 mL    Solution: 100 mL    TPN (Total Parenteral Nutrition): 1245 mL  Total IN: 2048.3 mL    OUT:    Nasoenteral Tube: 300 mL    Ureteral Catheter: 1770 mL  Total OUT: 2070 mL    Total NET: -21.7 mL        Weight (kg): 83.2 (07-09 @ 11:20)  07-09    140  |  98  |  35<H>  ----------------------------<  230<H>  3.6   |  31  |  0.76    Ca    8.2<L>      09 Jul 2019 04:50  Phos  4.5     07-09  Mg     1.9     07-09      [x] Ching catheter, indication: N/A  Meds: fat emulsion (Fish Oil and Plant Based) 20% Infusion 16.7 mL/Hr IV Continuous <Continuous>  iron sucrose IVPB 200 milliGRAM(s) IV Intermittent every 24 hours  Parenteral Nutrition - Adult 1 Each TPN Continuous <Continuous>        HEMATOLOGIC  Meds: enoxaparin Injectable 40 milliGRAM(s) SubCutaneous daily    [x] VTE Prophylaxis                        8.2    27.5  )-----------( 137      ( 09 Jul 2019 04:50 )             23.5     PT/INR - ( 08 Jul 2019 02:35 )   PT: 15.7 sec;   INR: 1.36 ratio         PTT - ( 08 Jul 2019 02:35 )  PTT:28.3 sec  Transfusion     [ ] PRBC   [ ] Platelets   [ ] FFP   [ ] Cryoprecipitate      INFECTIOUS DISEASES  WBC Count: 27.5 K/uL (07-09 @ 04:50)    RECENT CULTURES:  Specimen Source: .Body Fluid abdominal fluid  Date/Time: 07-09 @ 17:54  Culture Results: --  Gram Stain:   polymorphonuclear leukocytes seen  No organisms seen  by cytocentrifuge  Organism: --  Specimen Source: .Body Fluid abdominal fluid  Date/Time: 07-09 @ 17:46  Culture Results: --  Gram Stain:   polymorphonuclear leukocytes seen  No organisms seen  by cytocentrifuge  Organism: --    Meds: imipenem/cilastatin  IVPB 500 milliGRAM(s) IV Intermittent every 6 hours        ENDOCRINE  CAPILLARY BLOOD GLUCOSE      POCT Blood Glucose.: 156 mg/dL (10 Jul 2019 01:31)  POCT Blood Glucose.: 115 mg/dL (10 Jul 2019 00:06)  POCT Blood Glucose.: 95 mg/dL (09 Jul 2019 23:01)  POCT Blood Glucose.: 125 mg/dL (09 Jul 2019 21:59)  POCT Blood Glucose.: 106 mg/dL (09 Jul 2019 21:01)  POCT Blood Glucose.: 113 mg/dL (09 Jul 2019 20:02)  POCT Blood Glucose.: 118 mg/dL (09 Jul 2019 18:56)  POCT Blood Glucose.: 172 mg/dL (09 Jul 2019 17:53)  POCT Blood Glucose.: 185 mg/dL (09 Jul 2019 17:08)  POCT Blood Glucose.: 225 mg/dL (09 Jul 2019 17:06)  POCT Blood Glucose.: 225 mg/dL (09 Jul 2019 16:11)  POCT Blood Glucose.: 225 mg/dL (09 Jul 2019 15:28)  POCT Blood Glucose.: 260 mg/dL (09 Jul 2019 14:03)  POCT Blood Glucose.: 135 mg/dL (09 Jul 2019 10:29)  POCT Blood Glucose.: 189 mg/dL (09 Jul 2019 08:28)  POCT Blood Glucose.: 197 mg/dL (09 Jul 2019 08:07)  POCT Blood Glucose.: 252 mg/dL (09 Jul 2019 07:08)  POCT Blood Glucose.: 234 mg/dL (09 Jul 2019 06:25)  POCT Blood Glucose.: 199 mg/dL (09 Jul 2019 05:05)  POCT Blood Glucose.: 205 mg/dL (09 Jul 2019 04:08)  POCT Blood Glucose.: 211 mg/dL (09 Jul 2019 03:00)  POCT Blood Glucose.: 175 mg/dL (09 Jul 2019 02:07)    Meds: insulin lispro (HumaLOG) corrective regimen sliding scale   SubCutaneous every 4 hours        ACCESS DEVICES:  [ ] Peripheral IV  [ ] Central Venous Line	[ ] R	[ ] L	[ ] IJ	[ ] Fem	[ ] SC	Placed:   [ ] Arterial Line		[ ] R	[ ] L	[ ] Fem	[ ] Rad	[ ] Ax	Placed:   [ ] PICC:					[ ] Mediport  [ ] Urinary Catheter, Date Placed:   [x] Necessity of urinary, arterial, and venous catheters discussed    OTHER MEDICATIONS:  chlorhexidine 0.12% Liquid 15 milliLiter(s) Oral Mucosa <User Schedule>  chlorhexidine 2% Cloths 1 Application(s) Topical daily      CODE STATUS: Full Code      IMAGING: HISTORY  74y Female PMHx of HFrEF s/p AICD, asthma, HTN, HLD, atrial fibrillation on eliquis p/w severe, sudden onset L sided abd pain which started yesterday morning after eating. She endorsed nausea at the time but no vomiting. She did have any fevers, chills, diarrhea or hematochezia.      On 6/30, CTAP w/ IV contrast showed proximal descending colon/splenic flexure diverticulitis with small amount of free fluid and 3cm low attenuation structure in the right adnexa ?fluid collection ?ovarian cyst. She developed dyspnea and wheezing after the CT scan and became hypoxic. Bedside US int he ED demonstrated a few B lines. She received 80mg total of IV lasix, 125 of solumedrol, duonebs x2 and narcan and she was admitted to MICU for management of respiratory and heart failure.     Through the next day (7/1), she was persistently tachycardic with BPs as low as 76/50, MAPs in low 60s. She was bolused with 500cc of crystalloid without improvement in blood pressure. She was then started on phenylephrine. Her lactate was uptrending from 2 to 2.6 to 3 on the arterial blood gas. A repeat CT scan showed "Acute diverticulitis at the splenic flexure with evidence of contained perforation. No drainable regional collection. Increased ascites in the deep pelvis." She was transferred to the SICU for further HD monitoring. Antibiotics were switched to Aztreonam and flagyl. Bedside ultrasound showed IV 1.5 minimally collapsable and echo consistent with euvolemic state. Antibiotics again switched to Ceftriaxone and flagyl. OGT switched to NGT. Patient became tachypneic during SBT on 7/6 so she could not be extubated at that time. She was kept intubated for a plan to return to the OR on 7/8.    24 HOUR EVENTS:  -Went to OR for abdominal washout and abthera vac placement. Abdominal wall flaps made, however still unable to close. Cultures sent from the OR as purulent fluid present in abdomen. Pt left intubated  -Left intubated overnight  -Abx changed to imipenem  -Continued diuresis w/ lasix and metalozone  -Tachycardic to 120s overnight  -Increased dose of lopressor to 2.5 q6h    SUBJECTIVE/ROS:  [x] A ten-point review of systems was otherwise negative except as noted.  [ ] Due to altered mental status/intubation, subjective information were not able to be obtained from the patient. History was obtained, to the extent possible, from review of the chart and collateral sources of information.      NEURO  Exam: intubated, follows commands, off sedation  Meds: acetaminophen    Suspension .. 975 milliGRAM(s) Oral every 6 hours  HYDROmorphone  Injectable 0.5 milliGRAM(s) IV Push every 3 hours PRN pain    [x] Adequacy of sedation and pain control has been assessed and adjusted      RESPIRATORY  RR: 19 (07-10-19 @ 01:00) (19 - 36)  SpO2: 100% (07-10-19 @ 01:00) (88% - 100%)  Wt(kg): --  Exam: unlabored, clear to auscultation bilaterally  Mechanical Ventilation: Mode: AC/ CMV (Assist Control/ Continuous Mandatory Ventilation), RR (machine): 16, RR (patient): 18, TV (machine): 450, FiO2: 40, PEEP: 5, ITime: 1, MAP: 9, PIP: 23    [N/A] Extubation Readiness Assessed  Meds: ALBUTerol/ipratropium for Nebulization 3 milliLiter(s) Nebulizer every 6 hours  buDESOnide    Inhalation Suspension 0.5 milliGRAM(s) Inhalation every 12 hours        CARDIOVASCULAR  HR: 127 (07-10-19 @ 01:00) (86 - 128)  BP: 98/52 (07-10-19 @ 01:00) (87/59 - 141/67)  BP(mean): 71 (07-10-19 @ 01:00) (65 - 93)  ABP: --  ABP(mean): --  Wt(kg): --  CVP(cm H2O): --  VBG - ( 08 Jul 2019 02:25 )  pH: 7.43  /  pCO2: 46    /  pO2: 39    / HCO3: 30    / Base Excess: 5.7   /  SaO2: 70     Lactate: 1.8                Exam: regular rate and rhythm  Cardiac Rhythm: sinus  Perfusion     [x]Adequate   [ ]Inadequate  Mentation   [x]Normal       [ ]Reduced  Extremities  [x]Warm         [ ]Cool  Volume Status [ ]Hypervolemic [x]Euvolemic [ ]Hypovolemic  Meds: metoprolol tartrate Injectable 2.5 milliGRAM(s) IV Push every 6 hours        GI/NUTRITION  Exam: soft, nontender, nondistended, abthera w/ good suction and ss output, ostomy w/o function  Diet: NPO w/ TPN  Meds: pantoprazole  Injectable 40 milliGRAM(s) IV Push every 24 hours      GENITOURINARY  I&O's Detail    07-08 @ 07:01  -  07-09 @ 07:00  --------------------------------------------------------  IN:    Enteral Tube Flush: 520 mL    fat emulsion (Fish Oil and Plant Based) 20% Infusion: 217.1 mL    insulin regular Infusion: 128 mL    Solution: 150 mL    TPN (Total Parenteral Nutrition): 1992 mL  Total IN: 3007.1 mL    OUT:    Nasoenteral Tube: 1050 mL    Ureteral Catheter: 4585 mL    VAC (Vacuum Assisted Closure) System: 700 mL  Total OUT: 6335 mL    Total NET: -3327.9 mL      07-09 @ 07:01  -  07-10 @ 01:39  --------------------------------------------------------  IN:    Enteral Tube Flush: 220 mL    fat emulsion (Fish Oil and Plant Based) 20% Infusion: 150.3 mL    insulin regular Infusion: 33 mL    Solution: 300 mL    Solution: 100 mL    TPN (Total Parenteral Nutrition): 1245 mL  Total IN: 2048.3 mL    OUT:    Nasoenteral Tube: 300 mL    Ureteral Catheter: 1770 mL  Total OUT: 2070 mL    Total NET: -21.7 mL        Weight (kg): 83.2 (07-09 @ 11:20)  07-09    140  |  98  |  35<H>  ----------------------------<  230<H>  3.6   |  31  |  0.76    Ca    8.2<L>      09 Jul 2019 04:50  Phos  4.5     07-09  Mg     1.9     07-09      [x] Ching catheter, indication: N/A  Meds: fat emulsion (Fish Oil and Plant Based) 20% Infusion 16.7 mL/Hr IV Continuous <Continuous>  iron sucrose IVPB 200 milliGRAM(s) IV Intermittent every 24 hours  Parenteral Nutrition - Adult 1 Each TPN Continuous <Continuous>        HEMATOLOGIC  Meds: enoxaparin Injectable 40 milliGRAM(s) SubCutaneous daily    [x] VTE Prophylaxis                        8.2    27.5  )-----------( 137      ( 09 Jul 2019 04:50 )             23.5     PT/INR - ( 08 Jul 2019 02:35 )   PT: 15.7 sec;   INR: 1.36 ratio         PTT - ( 08 Jul 2019 02:35 )  PTT:28.3 sec  Transfusion     [ ] PRBC   [ ] Platelets   [ ] FFP   [ ] Cryoprecipitate      INFECTIOUS DISEASES  WBC Count: 27.5 K/uL (07-09 @ 04:50)    RECENT CULTURES:  Specimen Source: .Body Fluid abdominal fluid  Date/Time: 07-09 @ 17:54  Culture Results: --  Gram Stain:   polymorphonuclear leukocytes seen  No organisms seen  by cytocentrifuge  Organism: --  Specimen Source: .Body Fluid abdominal fluid  Date/Time: 07-09 @ 17:46  Culture Results: --  Gram Stain:   polymorphonuclear leukocytes seen  No organisms seen  by cytocentrifuge  Organism: --    Meds: imipenem/cilastatin  IVPB 500 milliGRAM(s) IV Intermittent every 6 hours        ENDOCRINE  CAPILLARY BLOOD GLUCOSE      POCT Blood Glucose.: 156 mg/dL (10 Jul 2019 01:31)  POCT Blood Glucose.: 115 mg/dL (10 Jul 2019 00:06)  POCT Blood Glucose.: 95 mg/dL (09 Jul 2019 23:01)  POCT Blood Glucose.: 125 mg/dL (09 Jul 2019 21:59)  POCT Blood Glucose.: 106 mg/dL (09 Jul 2019 21:01)  POCT Blood Glucose.: 113 mg/dL (09 Jul 2019 20:02)  POCT Blood Glucose.: 118 mg/dL (09 Jul 2019 18:56)  POCT Blood Glucose.: 172 mg/dL (09 Jul 2019 17:53)  POCT Blood Glucose.: 185 mg/dL (09 Jul 2019 17:08)  POCT Blood Glucose.: 225 mg/dL (09 Jul 2019 17:06)  POCT Blood Glucose.: 225 mg/dL (09 Jul 2019 16:11)  POCT Blood Glucose.: 225 mg/dL (09 Jul 2019 15:28)  POCT Blood Glucose.: 260 mg/dL (09 Jul 2019 14:03)  POCT Blood Glucose.: 135 mg/dL (09 Jul 2019 10:29)  POCT Blood Glucose.: 189 mg/dL (09 Jul 2019 08:28)  POCT Blood Glucose.: 197 mg/dL (09 Jul 2019 08:07)  POCT Blood Glucose.: 252 mg/dL (09 Jul 2019 07:08)  POCT Blood Glucose.: 234 mg/dL (09 Jul 2019 06:25)  POCT Blood Glucose.: 199 mg/dL (09 Jul 2019 05:05)  POCT Blood Glucose.: 205 mg/dL (09 Jul 2019 04:08)  POCT Blood Glucose.: 211 mg/dL (09 Jul 2019 03:00)  POCT Blood Glucose.: 175 mg/dL (09 Jul 2019 02:07)    Meds: insulin lispro (HumaLOG) corrective regimen sliding scale   SubCutaneous every 4 hours        ACCESS DEVICES:  [ ] Peripheral IV  [ ] Central Venous Line	[ ] R	[ ] L	[ ] IJ	[ ] Fem	[ ] SC	Placed:   [ ] Arterial Line		[ ] R	[ ] L	[ ] Fem	[ ] Rad	[ ] Ax	Placed:   [ ] PICC:					[ ] Mediport  [ ] Urinary Catheter, Date Placed:   [x] Necessity of urinary, arterial, and venous catheters discussed    OTHER MEDICATIONS:  chlorhexidine 0.12% Liquid 15 milliLiter(s) Oral Mucosa <User Schedule>  chlorhexidine 2% Cloths 1 Application(s) Topical daily      CODE STATUS: Full Code      IMAGING:

## 2019-07-10 NOTE — PROGRESS NOTE ADULT - SUBJECTIVE AND OBJECTIVE BOX
Interval Events: OR for abdominal washout with abthera vac placement, intraabdominal fluid cultures obtained, stable in SICU post-op, remained intubated overnight, ABx changed to imipenem, continued diuresis w/lasix and metalozone, tachycardic to 120s overnight, lopressor increased to 2.6 q6h    S: Patient intubated    O: Vital Signs  T(C): 37.5 (07-10 @ 03:00), Max: 37.5 (07-10 @ 03:00)  HR: 116 (07-10 @ 05:00) (86 - 129)  BP: 96/51 (07-10 @ 05:00) (84/52 - 141/67)  RR: 24 (07-10 @ 05:00) (19 - 36)  SpO2: 100% (07-10 @ 05:00) (88% - 100%)  07-08-19 @ 07:01  -  07-09-19 @ 07:00  --------------------------------------------------------  IN: 3007.1 mL / OUT: 6335 mL / NET: -3327.9 mL    07-09-19 @ 07:01  -  07-10-19 @ 05:11  --------------------------------------------------------  IN: 2497.1 mL / OUT: 2200 mL / NET: 297.1 mL      Gen: NAD, following commands  Resp: airway patent, respirations unlabored  CVS: regular rate and rhythm  Abdomen: soft, nontender, ostomy pink and viable with no stool or gas in bag, abthera vac in place with good suction  : starr in place with clear urine output in bag  Extremities: no edema  Skin: warm, dry, appropriate color                          9.2    31.7  )-----------( 143      ( 10 Jul 2019 01:53 )             26.6   07-10    139  |  98  |  49<H>  ----------------------------<  160<H>  4.5   |  30  |  1.06    Ca    8.1<L>      10 Jul 2019 01:53  Phos  5.2     07-10  Mg     2.0     07-10 Interval Events: OR for abdominal washout with abthera vac placement, intraabdominal fluid cultures obtained, stable in SICU post-op, remained intubated overnight, ABx changed to imipenem, continued diuresis w/lasix and metalozone, tachycardic to 120s overnight, lopressor increased to 2.6 q6h    S: Patient intubated    O: Vital Signs  T(C): 37.5 (07-10 @ 03:00), Max: 37.5 (07-10 @ 03:00)  HR: 116 (07-10 @ 05:00) (86 - 129)  BP: 96/51 (07-10 @ 05:00) (84/52 - 141/67)  RR: 24 (07-10 @ 05:00) (19 - 36)  SpO2: 100% (07-10 @ 05:00) (88% - 100%)  07-08-19 @ 07:01  -  07-09-19 @ 07:00  --------------------------------------------------------  IN: 3007.1 mL / OUT: 6335 mL / NET: -3327.9 mL    07-09-19 @ 07:01  -  07-10-19 @ 05:11  --------------------------------------------------------  IN: 2497.1 mL / OUT: 2200 mL / NET: 297.1 mL      Gen: NAD, following commands  Resp: airway patent, respirations unlabored  CVS: regular rate and rhythm  HEENT: ETT secured in place, NGT secured in place  Abdomen: soft, nontender, ostomy pink and viable with no stool or gas in bag, abthera vac in place with good suction  : starr in place with clear urine output in bag  Extremities: no edema  Skin: warm, dry, appropriate color                          9.2    31.7  )-----------( 143      ( 10 Jul 2019 01:53 )             26.6   07-10    139  |  98  |  49<H>  ----------------------------<  160<H>  4.5   |  30  |  1.06    Ca    8.1<L>      10 Jul 2019 01:53  Phos  5.2     07-10  Mg     2.0     07-10 Interval Events: OR for abdominal washout with abthera vac placement, intraabdominal fluid cultures obtained, stable in SICU post-op, remained intubated overnight, ABx changed to imipenem, continued diuresis w/lasix and metalozone, tachycardic to 120s overnight, lopressor increased to 2.6 q6h    S: Patient intubated, off sedation, full vent support    O: Vital Signs  T(C): 37.5 (07-10 @ 03:00), Max: 37.5 (07-10 @ 03:00)  HR: 116 (07-10 @ 05:00) (86 - 129)  BP: 96/51 (07-10 @ 05:00) (84/52 - 141/67)  RR: 24 (07-10 @ 05:00) (19 - 36)  SpO2: 100% (07-10 @ 05:00) (88% - 100%)  07-08-19 @ 07:01  -  07-09-19 @ 07:00  --------------------------------------------------------  IN: 3007.1 mL / OUT: 6335 mL / NET: -3327.9 mL    07-09-19 @ 07:01  -  07-10-19 @ 05:11  --------------------------------------------------------  IN: 2497.1 mL / OUT: 2200 mL / NET: 297.1 mL      Gen: NAD, following commands  Resp: airway patent, respirations unlabored  CVS: regular rate and rhythm  HEENT: ETT secured in place, OGT secured in place with 800cc gastric contents in canister  Abdomen: soft, nontender, ostomy pink and viable with no stool or gas in bag, abthera vac in place with good suction 500cc serosanguinous output in canister  : starr in place with clear urine output in bag  Extremities: no edema  Skin: warm, dry, appropriate color                          9.2    31.7  )-----------( 143      ( 10 Jul 2019 01:53 )             26.6   07-10    139  |  98  |  49<H>  ----------------------------<  160<H>  4.5   |  30  |  1.06    Ca    8.1<L>      10 Jul 2019 01:53  Phos  5.2     07-10  Mg     2.0     07-10

## 2019-07-11 LAB
-  AMPICILLIN: SIGNIFICANT CHANGE UP
-  AMPICILLIN: SIGNIFICANT CHANGE UP
-  TETRACYCLINE: SIGNIFICANT CHANGE UP
-  TETRACYCLINE: SIGNIFICANT CHANGE UP
-  VANCOMYCIN: SIGNIFICANT CHANGE UP
-  VANCOMYCIN: SIGNIFICANT CHANGE UP
ANION GAP SERPL CALC-SCNC: 10 MMOL/L — SIGNIFICANT CHANGE UP (ref 5–17)
ANION GAP SERPL CALC-SCNC: 11 MMOL/L — SIGNIFICANT CHANGE UP (ref 5–17)
ANION GAP SERPL CALC-SCNC: 6 MMOL/L — SIGNIFICANT CHANGE UP (ref 5–17)
ANION GAP SERPL CALC-SCNC: 9 MMOL/L — SIGNIFICANT CHANGE UP (ref 5–17)
BASE EXCESS BLDV CALC-SCNC: -2.4 MMOL/L — LOW (ref -2–2)
BUN SERPL-MCNC: 45 MG/DL — HIGH (ref 7–23)
BUN SERPL-MCNC: 50 MG/DL — HIGH (ref 7–23)
BUN SERPL-MCNC: 54 MG/DL — HIGH (ref 7–23)
BUN SERPL-MCNC: 56 MG/DL — HIGH (ref 7–23)
CA-I SERPL-SCNC: 1.24 MMOL/L — SIGNIFICANT CHANGE UP (ref 1.12–1.3)
CALCIUM SERPL-MCNC: 5.8 MG/DL — CRITICAL LOW (ref 8.4–10.5)
CALCIUM SERPL-MCNC: 8.2 MG/DL — LOW (ref 8.4–10.5)
CALCIUM SERPL-MCNC: 8.7 MG/DL — SIGNIFICANT CHANGE UP (ref 8.4–10.5)
CALCIUM SERPL-MCNC: 8.8 MG/DL — SIGNIFICANT CHANGE UP (ref 8.4–10.5)
CHLORIDE BLDV-SCNC: 96 MMOL/L — SIGNIFICANT CHANGE UP (ref 96–108)
CHLORIDE SERPL-SCNC: 102 MMOL/L — SIGNIFICANT CHANGE UP (ref 96–108)
CHLORIDE SERPL-SCNC: 92 MMOL/L — LOW (ref 96–108)
CHLORIDE SERPL-SCNC: 97 MMOL/L — SIGNIFICANT CHANGE UP (ref 96–108)
CHLORIDE SERPL-SCNC: 97 MMOL/L — SIGNIFICANT CHANGE UP (ref 96–108)
CO2 BLDV-SCNC: 29 MMOL/L — SIGNIFICANT CHANGE UP (ref 22–30)
CO2 SERPL-SCNC: 17 MMOL/L — LOW (ref 22–31)
CO2 SERPL-SCNC: 25 MMOL/L — SIGNIFICANT CHANGE UP (ref 22–31)
CO2 SERPL-SCNC: 26 MMOL/L — SIGNIFICANT CHANGE UP (ref 22–31)
CO2 SERPL-SCNC: 31 MMOL/L — SIGNIFICANT CHANGE UP (ref 22–31)
CREAT SERPL-MCNC: 0.46 MG/DL — LOW (ref 0.5–1.3)
CREAT SERPL-MCNC: 0.84 MG/DL — SIGNIFICANT CHANGE UP (ref 0.5–1.3)
CREAT SERPL-MCNC: 0.87 MG/DL — SIGNIFICANT CHANGE UP (ref 0.5–1.3)
CREAT SERPL-MCNC: 0.97 MG/DL — SIGNIFICANT CHANGE UP (ref 0.5–1.3)
GAS PNL BLDV: 122 MMOL/L — LOW (ref 135–145)
GAS PNL BLDV: SIGNIFICANT CHANGE UP
GAS PNL BLDV: SIGNIFICANT CHANGE UP
GLUCOSE BLDC GLUCOMTR-MCNC: 101 MG/DL — HIGH (ref 70–99)
GLUCOSE BLDC GLUCOMTR-MCNC: 104 MG/DL — HIGH (ref 70–99)
GLUCOSE BLDC GLUCOMTR-MCNC: 110 MG/DL — HIGH (ref 70–99)
GLUCOSE BLDC GLUCOMTR-MCNC: 113 MG/DL — HIGH (ref 70–99)
GLUCOSE BLDC GLUCOMTR-MCNC: 130 MG/DL — HIGH (ref 70–99)
GLUCOSE BLDC GLUCOMTR-MCNC: 135 MG/DL — HIGH (ref 70–99)
GLUCOSE BLDC GLUCOMTR-MCNC: 150 MG/DL — HIGH (ref 70–99)
GLUCOSE BLDC GLUCOMTR-MCNC: 154 MG/DL — HIGH (ref 70–99)
GLUCOSE BLDC GLUCOMTR-MCNC: 173 MG/DL — HIGH (ref 70–99)
GLUCOSE BLDC GLUCOMTR-MCNC: 197 MG/DL — HIGH (ref 70–99)
GLUCOSE BLDC GLUCOMTR-MCNC: 207 MG/DL — HIGH (ref 70–99)
GLUCOSE BLDC GLUCOMTR-MCNC: 222 MG/DL — HIGH (ref 70–99)
GLUCOSE BLDC GLUCOMTR-MCNC: 229 MG/DL — HIGH (ref 70–99)
GLUCOSE BLDC GLUCOMTR-MCNC: 94 MG/DL — SIGNIFICANT CHANGE UP (ref 70–99)
GLUCOSE BLDV-MCNC: 735 MG/DL — CRITICAL HIGH (ref 70–99)
GLUCOSE SERPL-MCNC: 167 MG/DL — HIGH (ref 70–99)
GLUCOSE SERPL-MCNC: 174 MG/DL — HIGH (ref 70–99)
GLUCOSE SERPL-MCNC: 244 MG/DL — HIGH (ref 70–99)
GLUCOSE SERPL-MCNC: 776 MG/DL — CRITICAL HIGH (ref 70–99)
HCO3 BLDV-SCNC: 27 MMOL/L — SIGNIFICANT CHANGE UP (ref 21–29)
HCT VFR BLD CALC: 18.4 % — CRITICAL LOW (ref 34.5–45)
HCT VFR BLD CALC: 22.9 % — LOW (ref 34.5–45)
HCT VFR BLD CALC: 24.6 % — LOW (ref 34.5–45)
HCT VFR BLD CALC: 28.1 % — LOW (ref 34.5–45)
HCT VFR BLDA CALC: 25 % — LOW (ref 39–50)
HGB BLD CALC-MCNC: 8 G/DL — LOW (ref 11.5–15.5)
HGB BLD-MCNC: 6.7 G/DL — CRITICAL LOW (ref 11.5–15.5)
HGB BLD-MCNC: 7.6 G/DL — LOW (ref 11.5–15.5)
HGB BLD-MCNC: 8.3 G/DL — LOW (ref 11.5–15.5)
HGB BLD-MCNC: 8.7 G/DL — LOW (ref 11.5–15.5)
HOROWITZ INDEX BLDV+IHG-RTO: 40 — SIGNIFICANT CHANGE UP
LACTATE BLDV-MCNC: 1.3 MMOL/L — SIGNIFICANT CHANGE UP (ref 0.7–2)
MAGNESIUM SERPL-MCNC: 1.8 MG/DL — SIGNIFICANT CHANGE UP (ref 1.6–2.6)
MAGNESIUM SERPL-MCNC: 2 MG/DL — SIGNIFICANT CHANGE UP (ref 1.6–2.6)
MAGNESIUM SERPL-MCNC: 2 MG/DL — SIGNIFICANT CHANGE UP (ref 1.6–2.6)
MAGNESIUM SERPL-MCNC: 2.3 MG/DL — SIGNIFICANT CHANGE UP (ref 1.6–2.6)
MCHC RBC-ENTMCNC: 29.6 PG — SIGNIFICANT CHANGE UP (ref 27–34)
MCHC RBC-ENTMCNC: 31 GM/DL — LOW (ref 32–36)
MCHC RBC-ENTMCNC: 31.6 PG — SIGNIFICANT CHANGE UP (ref 27–34)
MCHC RBC-ENTMCNC: 33.3 GM/DL — SIGNIFICANT CHANGE UP (ref 32–36)
MCHC RBC-ENTMCNC: 33.5 PG — SIGNIFICANT CHANGE UP (ref 27–34)
MCHC RBC-ENTMCNC: 33.8 GM/DL — SIGNIFICANT CHANGE UP (ref 32–36)
MCHC RBC-ENTMCNC: 34.3 PG — HIGH (ref 27–34)
MCHC RBC-ENTMCNC: 36.3 GM/DL — HIGH (ref 32–36)
MCV RBC AUTO: 94.4 FL — SIGNIFICANT CHANGE UP (ref 80–100)
MCV RBC AUTO: 95 FL — SIGNIFICANT CHANGE UP (ref 80–100)
MCV RBC AUTO: 95.5 FL — SIGNIFICANT CHANGE UP (ref 80–100)
MCV RBC AUTO: 99.3 FL — SIGNIFICANT CHANGE UP (ref 80–100)
METHOD TYPE: SIGNIFICANT CHANGE UP
METHOD TYPE: SIGNIFICANT CHANGE UP
OTHER CELLS CSF MANUAL: 9 ML/DL — LOW (ref 18–22)
PCO2 BLDV: 82 MMHG — HIGH (ref 35–50)
PH BLDV: 7.14 — CRITICAL LOW (ref 7.35–7.45)
PHOSPHATE SERPL-MCNC: 4.2 MG/DL — SIGNIFICANT CHANGE UP (ref 2.5–4.5)
PHOSPHATE SERPL-MCNC: 4.9 MG/DL — HIGH (ref 2.5–4.5)
PHOSPHATE SERPL-MCNC: 5.2 MG/DL — HIGH (ref 2.5–4.5)
PHOSPHATE SERPL-MCNC: 5.6 MG/DL — HIGH (ref 2.5–4.5)
PLATELET # BLD AUTO: 204 K/UL — SIGNIFICANT CHANGE UP (ref 150–400)
PLATELET # BLD AUTO: 217 K/UL — SIGNIFICANT CHANGE UP (ref 150–400)
PLATELET # BLD AUTO: 248 K/UL — SIGNIFICANT CHANGE UP (ref 150–400)
PLATELET # BLD AUTO: 318 K/UL — SIGNIFICANT CHANGE UP (ref 150–400)
PO2 BLDV: 64 MMHG — HIGH (ref 25–45)
POTASSIUM BLDV-SCNC: 6.7 MMOL/L — CRITICAL HIGH (ref 3.5–5.3)
POTASSIUM SERPL-MCNC: 3.3 MMOL/L — LOW (ref 3.5–5.3)
POTASSIUM SERPL-MCNC: 4.2 MMOL/L — SIGNIFICANT CHANGE UP (ref 3.5–5.3)
POTASSIUM SERPL-MCNC: 7.1 MMOL/L — CRITICAL HIGH (ref 3.5–5.3)
POTASSIUM SERPL-MCNC: >9 MMOL/L — CRITICAL HIGH (ref 3.5–5.3)
POTASSIUM SERPL-SCNC: 3.3 MMOL/L — LOW (ref 3.5–5.3)
POTASSIUM SERPL-SCNC: 4.2 MMOL/L — SIGNIFICANT CHANGE UP (ref 3.5–5.3)
POTASSIUM SERPL-SCNC: 7.1 MMOL/L — CRITICAL HIGH (ref 3.5–5.3)
POTASSIUM SERPL-SCNC: >9 MMOL/L — CRITICAL HIGH (ref 3.5–5.3)
RBC # BLD: 1.95 M/UL — LOW (ref 3.8–5.2)
RBC # BLD: 2.41 M/UL — LOW (ref 3.8–5.2)
RBC # BLD: 2.48 M/UL — LOW (ref 3.8–5.2)
RBC # BLD: 2.94 M/UL — LOW (ref 3.8–5.2)
RBC # FLD: 14.7 % — HIGH (ref 10.3–14.5)
RBC # FLD: 15.1 % — HIGH (ref 10.3–14.5)
RBC # FLD: 15.1 % — HIGH (ref 10.3–14.5)
RBC # FLD: 15.6 % — HIGH (ref 10.3–14.5)
SAO2 % BLDV: 83 % — SIGNIFICANT CHANGE UP (ref 67–88)
SODIUM SERPL-SCNC: 123 MMOL/L — LOW (ref 135–145)
SODIUM SERPL-SCNC: 129 MMOL/L — LOW (ref 135–145)
SODIUM SERPL-SCNC: 132 MMOL/L — LOW (ref 135–145)
SODIUM SERPL-SCNC: 139 MMOL/L — SIGNIFICANT CHANGE UP (ref 135–145)
WBC # BLD: 24.6 K/UL — HIGH (ref 3.8–10.5)
WBC # BLD: 29.3 K/UL — HIGH (ref 3.8–10.5)
WBC # BLD: 29.8 K/UL — HIGH (ref 3.8–10.5)
WBC # BLD: 32.3 K/UL — HIGH (ref 3.8–10.5)
WBC # FLD AUTO: 24.6 K/UL — HIGH (ref 3.8–10.5)
WBC # FLD AUTO: 29.3 K/UL — HIGH (ref 3.8–10.5)
WBC # FLD AUTO: 29.8 K/UL — HIGH (ref 3.8–10.5)
WBC # FLD AUTO: 32.3 K/UL — HIGH (ref 3.8–10.5)

## 2019-07-11 PROCEDURE — 99233 SBSQ HOSP IP/OBS HIGH 50: CPT

## 2019-07-11 PROCEDURE — 71045 X-RAY EXAM CHEST 1 VIEW: CPT | Mod: 26,77

## 2019-07-11 PROCEDURE — 71045 X-RAY EXAM CHEST 1 VIEW: CPT | Mod: 26

## 2019-07-11 PROCEDURE — 99232 SBSQ HOSP IP/OBS MODERATE 35: CPT

## 2019-07-11 RX ORDER — ELECTROLYTE SOLUTION,INJ
1 VIAL (ML) INTRAVENOUS
Refills: 0 | Status: DISCONTINUED | OUTPATIENT
Start: 2019-07-11 | End: 2019-07-11

## 2019-07-11 RX ORDER — INSULIN LISPRO 100/ML
VIAL (ML) SUBCUTANEOUS EVERY 4 HOURS
Refills: 0 | Status: DISCONTINUED | OUTPATIENT
Start: 2019-07-11 | End: 2019-07-12

## 2019-07-11 RX ORDER — FUROSEMIDE 40 MG
40 TABLET ORAL ONCE
Refills: 0 | Status: COMPLETED | OUTPATIENT
Start: 2019-07-11 | End: 2019-07-11

## 2019-07-11 RX ORDER — I.V. FAT EMULSION 20 G/100ML
16.7 EMULSION INTRAVENOUS
Qty: 40 | Refills: 0 | Status: DISCONTINUED | OUTPATIENT
Start: 2019-07-11 | End: 2019-07-12

## 2019-07-11 RX ORDER — POTASSIUM CHLORIDE 20 MEQ
20 PACKET (EA) ORAL
Refills: 0 | Status: COMPLETED | OUTPATIENT
Start: 2019-07-11 | End: 2019-07-11

## 2019-07-11 RX ORDER — CHLORHEXIDINE GLUCONATE 213 G/1000ML
15 SOLUTION TOPICAL EVERY 12 HOURS
Refills: 0 | Status: DISCONTINUED | OUTPATIENT
Start: 2019-07-11 | End: 2019-07-12

## 2019-07-11 RX ORDER — ACETAZOLAMIDE 250 MG/1
250 TABLET ORAL ONCE
Refills: 0 | Status: COMPLETED | OUTPATIENT
Start: 2019-07-11 | End: 2019-07-11

## 2019-07-11 RX ADMIN — IMIPENEM AND CILASTATIN 100 MILLIGRAM(S): 250; 250 INJECTION, POWDER, FOR SOLUTION INTRAVENOUS at 17:01

## 2019-07-11 RX ADMIN — Medication 0.5 MILLIGRAM(S): at 05:32

## 2019-07-11 RX ADMIN — Medication 2.5 MILLIGRAM(S): at 22:13

## 2019-07-11 RX ADMIN — IMIPENEM AND CILASTATIN 100 MILLIGRAM(S): 250; 250 INJECTION, POWDER, FOR SOLUTION INTRAVENOUS at 12:38

## 2019-07-11 RX ADMIN — Medication 3 MILLILITER(S): at 23:31

## 2019-07-11 RX ADMIN — ACETAZOLAMIDE 250 MILLIGRAM(S): 250 TABLET ORAL at 00:15

## 2019-07-11 RX ADMIN — Medication 50 MILLIEQUIVALENT(S): at 06:12

## 2019-07-11 RX ADMIN — CHLORHEXIDINE GLUCONATE 1 APPLICATION(S): 213 SOLUTION TOPICAL at 11:21

## 2019-07-11 RX ADMIN — PANTOPRAZOLE SODIUM 40 MILLIGRAM(S): 20 TABLET, DELAYED RELEASE ORAL at 05:29

## 2019-07-11 RX ADMIN — Medication 3 MILLILITER(S): at 00:18

## 2019-07-11 RX ADMIN — Medication 400 MILLIGRAM(S): at 00:15

## 2019-07-11 RX ADMIN — Medication 1 EACH: at 20:30

## 2019-07-11 RX ADMIN — Medication 50 MILLIEQUIVALENT(S): at 07:24

## 2019-07-11 RX ADMIN — CHLORHEXIDINE GLUCONATE 15 MILLILITER(S): 213 SOLUTION TOPICAL at 13:28

## 2019-07-11 RX ADMIN — BENZOCAINE AND MENTHOL 1 LOZENGE: 5; 1 LIQUID ORAL at 13:29

## 2019-07-11 RX ADMIN — Medication 40 MILLIGRAM(S): at 12:38

## 2019-07-11 RX ADMIN — I.V. FAT EMULSION 16.7 ML/HR: 20 EMULSION INTRAVENOUS at 20:30

## 2019-07-11 RX ADMIN — Medication 1 EACH: at 07:24

## 2019-07-11 RX ADMIN — IMIPENEM AND CILASTATIN 100 MILLIGRAM(S): 250; 250 INJECTION, POWDER, FOR SOLUTION INTRAVENOUS at 00:15

## 2019-07-11 RX ADMIN — Medication 2.5 MILLIGRAM(S): at 05:29

## 2019-07-11 RX ADMIN — Medication 50 MILLIEQUIVALENT(S): at 10:09

## 2019-07-11 RX ADMIN — Medication 40 MILLIGRAM(S): at 00:15

## 2019-07-11 RX ADMIN — INSULIN HUMAN 3 UNIT(S)/HR: 100 INJECTION, SOLUTION SUBCUTANEOUS at 07:25

## 2019-07-11 RX ADMIN — IMIPENEM AND CILASTATIN 100 MILLIGRAM(S): 250; 250 INJECTION, POWDER, FOR SOLUTION INTRAVENOUS at 23:18

## 2019-07-11 RX ADMIN — ACETAZOLAMIDE 250 MILLIGRAM(S): 250 TABLET ORAL at 12:58

## 2019-07-11 RX ADMIN — Medication 2: at 13:29

## 2019-07-11 RX ADMIN — IMIPENEM AND CILASTATIN 100 MILLIGRAM(S): 250; 250 INJECTION, POWDER, FOR SOLUTION INTRAVENOUS at 06:12

## 2019-07-11 RX ADMIN — CHLORHEXIDINE GLUCONATE 15 MILLILITER(S): 213 SOLUTION TOPICAL at 05:30

## 2019-07-11 RX ADMIN — Medication 400 MILLIGRAM(S): at 06:13

## 2019-07-11 RX ADMIN — Medication 3 MILLILITER(S): at 12:26

## 2019-07-11 RX ADMIN — ENOXAPARIN SODIUM 40 MILLIGRAM(S): 100 INJECTION SUBCUTANEOUS at 12:38

## 2019-07-11 RX ADMIN — Medication 3 MILLILITER(S): at 05:32

## 2019-07-11 RX ADMIN — Medication 4: at 22:14

## 2019-07-11 RX ADMIN — CHLORHEXIDINE GLUCONATE 15 MILLILITER(S): 213 SOLUTION TOPICAL at 22:13

## 2019-07-11 RX ADMIN — HYDROMORPHONE HYDROCHLORIDE 0.5 MILLIGRAM(S): 2 INJECTION INTRAMUSCULAR; INTRAVENOUS; SUBCUTANEOUS at 11:21

## 2019-07-11 RX ADMIN — Medication 2.5 MILLIGRAM(S): at 16:50

## 2019-07-11 RX ADMIN — CHLORHEXIDINE GLUCONATE 1 APPLICATION(S): 213 SOLUTION TOPICAL at 05:30

## 2019-07-11 RX ADMIN — HYDROMORPHONE HYDROCHLORIDE 0.5 MILLIGRAM(S): 2 INJECTION INTRAMUSCULAR; INTRAVENOUS; SUBCUTANEOUS at 11:35

## 2019-07-11 NOTE — BRIEF OPERATIVE NOTE - NSICDXBRIEFPOSTOP_GEN_ALL_CORE_FT
POST-OP DIAGNOSIS:  Perforated diverticulum of large intestine 02-Jul-2019 14:14:35  Aleksandr Perez

## 2019-07-11 NOTE — PROGRESS NOTE ADULT - ASSESSMENT
A/P:74F admitted with abdominal pain and exam findings consistent with peritonitis secondary to acute diverticulitis at the splenic flexure with evidence of contained perforation now POD3 s/p Exploratory laparotomy, left hemicolectomy, end colostomy, and ABThera Vac placement and POD2 from RTOR for abdominal washout and Abthera vac placement, off pressors. TPN started 7/5 for Protein-Calorie Malnutrition    - TPN formula @ goal: Carbohydrates: 210grams, Amino Acid: 120grams, 40g lipids in 2400 ml, Total volume decreased to 2.0L from 2400 ml  - Pt off of Ceftriaxone -on Imipenem    - Strict Intake and Output.,   - Weights three times a week  - Monitor BMP, Mg, Ionized Ca, Phosphorus daily  - Check Triglycerides daily for first 3 days of TPN, then monthly   - Pre-albumin weekly.  - Hyperglycemia- Pt requiring Insulin gtt --Increased Insulin in TPN from 45U to 60U insulin, pt may need Insulin gtt in the periop period. Difficult to predict stress needs without causing hypoglycemia      Fingersticks to monitor glucose every 6 hours until stable then may be decreased to twice a day, coverage with ISS - to be ordered by primary team  - HyperNa-stabilized;  No NaAce or NaCl in TPN, only NaPhos 30mmol, increased TV to 2400ml originally, however SICU requesting volume be decreased since pt is being diuresed.       TV decreased to 2.0L and SICU advised to use Metolazone, loop diuretic to improve the hypernatremia, and decreased dose of lasix as titrating fluid needs.  - Hyperhosphatemia- NaPhos decreased from 30 to 15mmol  - HypoK increased to 100mEq  - Continue as per SICU/ Surgery, will follow with you d/w team  - Please do not hold TPN if/when pt goes to OR.  If TPN stopped, please hang D10.      TPN team, pager [phone], spectra [phone]  D/w Adela Cross

## 2019-07-11 NOTE — PROGRESS NOTE ADULT - ASSESSMENT
ASSESSMENT  74F admitted with abdominal pain and exam findings consistent with peritonitis secondary to acute diverticulitis at the splenic flexure with evidence of contained perforation s/p Exploratory laparotomy, left hemicolectomy, end colostomy, and ABThera Vac placement (7/2) and s/p multiple RTOR for abdominal washout and abthera vac placement (7/3, 7/5, 7/9), off pressors and s/p extubation on 7/10    PLAN:  NEURO: acute pain control  - Pain control with Tylenol / dilaudid PRN   - off sedation    RESPIRATORY: intubated, PMHx of Asthma  - AM CXR   - on duoneb and pulmicort  - Received lasix and diamox overnight with plan to get neg 1-2 liters    CARDIOVASCULAR:  off pressors, tachycardia  - C/w diuresis Lasix     - metoprolol 2.5 q4h, continues to be tachycardic, increase  - Hold home anti-hypertensives  - monitor i/o, urine output has been adequate thus far at 60-100cc/hr and >300 when given lasix  - daily weight downtrending    GI/NUTRITION: s/p ex lap and L hemicolectomy with end colostomy and ABThera VAC placement and washout  - NPO, IVF, NGT   - continue TPN - maximum volume of 2L per day  - monitor for ostomy function    - RTOR today or tomorrow for possible closure w/ mesh   - non tender abdomen    GENITOURINARY/RENAL: DIANNA on CKD stage 3  - continue diuresis as described in CV system  - BUN Cr still downtrending    HEMATOLOGIC: H/H stable  - Patient does NOT accept blood products  - Started on Iron IV x 5days  - Minimize blood draws   - Lovenox for DVT ppx  - Hold ASA and A/C    INFECTIOUS DISEASE: Colonic perforation, uptrending leukocytosis  - Cont imipenem   - afebrile  - f/u OR cultures speciation and sensitivities  - F/u AM WBC today  - f/u repeat blood cultures drawn yesterday    ENDOCRINE:  - insulin drip for hyperglycemia  - insulin in TPN 45 unit  - increase SSI to high    MSK:  - PT   - OOB if extubated     Lines:  NG, starr, VAC, PIV, PICC

## 2019-07-11 NOTE — PROGRESS NOTE ADULT - SUBJECTIVE AND OBJECTIVE BOX
[facility] NUTRITION SUPPORT / TPN -- FOLLOW UP NOTE  --------------------------------------------------------------------------------    24 hour events/subjective:  - Given Lasix 40 and diamox 250 and then extubated to Bipap  - Requiring increase in insulin in TPN to 45 units in addition to insulin gtt  - Given additional dose of lasix 40 and diamox 250 overnight  - Pt for RTOR for abdominal washout/ Ex Lap        Diet:  Diet, NPO:   Except Medications (07-09-19 @ 14:09)      Appetite: [ x ]Poor [  ]Adequate [  ]Good  Caloric intake:  [  x  Adequate   [   ] Inadequate    ROS: General/ GI see HPI  all other systems negative      ALLERGIES & MEDICATIONS  --------------------------------------------------------------------------------  ALLERGIES  Coreg (Other)  digoxin (Other; Short breath (Mild to Mod))  Lopressor (Short breath)  penicillins (Hives)        INTOLERANCES  metoprolol (Other)  Solu-Medrol (Other (Mild to Mod))    STANDING INPATIENT MEDICATIONS    ALBUTerol/ipratropium for Nebulization 3 milliLiter(s) Nebulizer every 6 hours  buDESOnide    Inhalation Suspension 0.5 milliGRAM(s) Inhalation every 12 hours  chlorhexidine 0.12% Liquid 15 milliLiter(s) Oral Mucosa <User Schedule>  chlorhexidine 2% Cloths 1 Application(s) Topical daily  enoxaparin Injectable 40 milliGRAM(s) SubCutaneous daily  fat emulsion (Fish Oil and Plant Based) 20% Infusion 16.7 mL/Hr IV Continuous <Continuous>  imipenem/cilastatin  IVPB 500 milliGRAM(s) IV Intermittent every 6 hours  insulin lispro (HumaLOG) corrective regimen sliding scale   SubCutaneous every 4 hours  iron sucrose IVPB 200 milliGRAM(s) IV Intermittent every 24 hours  metoprolol tartrate Injectable 2.5 milliGRAM(s) IV Push every 6 hours  pantoprazole  Injectable 40 milliGRAM(s) IV Push every 24 hours  Parenteral Nutrition - Adult 1 Each TPN Continuous <Continuous>  Parenteral Nutrition - Adult 1 Each TPN Continuous <Continuous>      PRN INPATIENT MEDICATION  benzocaine 15 mG/menthol 3.6 mG Lozenge 1 Lozenge Oral three times a day PRN  HYDROmorphone  Injectable 0.5 milliGRAM(s) IV Push every 3 hours PRN        VITALS/PHYSICAL EXAM  --------------------------------------------------------------------------------  T(C): 36.3 (07-11-19 @ 11:00), Max: 37 (07-10-19 @ 15:00)  HR: 99 (07-11-19 @ 13:00) (86 - 122)  BP: 128/58 (07-11-19 @ 13:00) (96/51 - 134/60)  RR: 36 (07-11-19 @ 13:00) (20 - 51)  SpO2: 100% (07-11-19 @ 13:00) (98% - 100%)  Wt(kg): --        07-10-19 @ 07:01  -  07-11-19 @ 07:00  --------------------------------------------------------  IN: 3198.3 mL / OUT: 4150 mL / NET: -951.7 mL    07-11-19 @ 07:01  -  07-11-19 @ 13:47  --------------------------------------------------------  IN: 605 mL / OUT: 550 mL / NET: 55 mL    Physical Exam:    	Gen: Guarded, but stable, alert, groggy  	HEENT: NC/AT, PERRL, mucosa moist, supple neck, clear oropharynx  	Chest: clear, intubated   	GI: +softly distended, nontender, no BS                  (+)Ostomy pink & viable, effluence noted                  ABthera intact w/ good seal              :  (+)Ching              MSK/Vascular: Passive ROM x4,  no clubbing, cyanosis, nor edema  	Neuro: unable to assess, sedated  	Skin: Warm, good turgor, without rashes        LABS/ CULTURES/ RADIOLOGY:              7.6    29.3  >-----------<  204      [07-11-19 @ 03:35]              22.9     139  |  97  |  56  ----------------------------<  174      [07-11-19 @ 03:35]  3.3   |  31  |  0.97        Ca     8.2     [07-11-19 @ 03:35]      Mg     2.0     [07-11-19 @ 03:35]      Phos  4.2     [07-11-19 @ 03:35]    Blood Gas Arterial - Calcium, Ionized: 1.17 mmoL/L (07-10-19 @ 11:11)    Prealbumin, Serum: 5 mg/dL (07-04-19 @ 22:45)     Triglycerides, Serum: 119 mg/dL (07.08.19 @ 04:24)      CAPILLARY BLOOD GLUCOSE    POCT Blood Glucose.: 154 mg/dL (11 Jul 2019 13:27)  POCT Blood Glucose.: 113 mg/dL (11 Jul 2019 11:14)  POCT Blood Glucose.: 94 mg/dL (11 Jul 2019 10:53)  POCT Blood Glucose.: 104 mg/dL (11 Jul 2019 09:55)  POCT Blood Glucose.: 110 mg/dL (11 Jul 2019 09:02)  POCT Blood Glucose.: 101 mg/dL (11 Jul 2019 07:57)  POCT Blood Glucose.: 130 mg/dL (11 Jul 2019 06:55)  POCT Blood Glucose.: 135 mg/dL (11 Jul 2019 06:07)  POCT Blood Glucose.: 150 mg/dL (11 Jul 2019 05:03)  POCT Blood Glucose.: 173 mg/dL (11 Jul 2019 04:04)  POCT Blood Glucose.: 197 mg/dL (11 Jul 2019 03:07)  POCT Blood Glucose.: 207 mg/dL (11 Jul 2019 01:58)  POCT Blood Glucose.: 222 mg/dL (11 Jul 2019 01:03)  POCT Blood Glucose.: 229 mg/dL (10 Jul 2019 23:56)  POCT Blood Glucose.: 218 mg/dL (10 Jul 2019 23:01)  POCT Blood Glucose.: 224 mg/dL (10 Jul 2019 22:02)  POCT Blood Glucose.: 240 mg/dL (10 Jul 2019 21:02)  POCT Blood Glucose.: 267 mg/dL (10 Jul 2019 19:56)  POCT Blood Glucose.: 265 mg/dL (10 Jul 2019 17:02)      Culture - Body Fluid with Gram Stain (07.09.19 @ 17:54)    Gram Stain:   polymorphonuclear leukocytes seen  No organisms seen  by cytocentrifuge    Specimen Source: .Body Fluid abdominal fluid    Culture Results:   Few Enterococcus species    Culture - Body Fluid with Gram Stain (07.09.19 @ 17:46)    Gram Stain:   polymorphonuclear leukocytes seen  No organisms seen  by cytocentrifuge    Specimen Source: .Body Fluid abdominal fluid    Culture Results:   Few Enterococcus species    < from: Xray Chest 1 View- PORTABLE-Routine (07.10.19 @ 06:44) >  FINDINGS:    Right upper extremity PICC line with tip in the SVC.  ET tube with lon.  Enteric tube seen coursing below the diaphragm, tip outside field of   imaging.  Left chest wall AICD.    The lungs appear to be clear.  There is no pneumothorax. Small right pleural effusion. Incomplete   imaging of the left costophrenic angle.  The cardiomediastinal silhouette cannot be adequately assessed on this   projection.    IMPRESSION:   Clear lungs.

## 2019-07-11 NOTE — BRIEF OPERATIVE NOTE - OPERATION/FINDINGS
purulent ascites present  copiously irrigated and suctioned  all bowel viable  colonic stump intact  unable to close abdomen without tension
perforation of distal transverse colon  feculent peritonitis  significant small bowel edema obviating abdominal wall closure  able to bring out transverse end colostomy
Unable to close abdomen due to bowel edema with loss of domain and fascial retraction  Bowel densely adhesed to self at this point, no purulence appreciated during washout  Placed [id number] Strattice to close abdomen, 2 19F Wolfgang drains left, one in upper abd and other in pelvis  Black wound vac placed over biologic mesh  NG re-positioned into mid-body of stomach
Bowel pink and viable. No areas of necrosis. No gross pus in abdomen. Ostomy pink and viable.
Edematous bowel, all viable. Minimal pus-like fluid in pelvis. Sent for culture. Anterior flaps raised but still unable to close abdominal wall defect primarily. Abthera vac replaced.

## 2019-07-11 NOTE — PROGRESS NOTE ADULT - SUBJECTIVE AND OBJECTIVE BOX
Interval Events: extubated to BIPAP, requiring increase in insulin, diuresis continued with lasix and diamox    S: Patient doing well, denies fevers, chills, nausea, emesis, chest pain, SOB.     O: Vital Signs  T(C): 36.6 (07-11 @ 03:00), Max: 37 (07-10 @ 15:00)  HR: 99 (07-11 @ 05:00) (86 - 122)  BP: 129/56 (07-11 @ 05:00) (83/46 - 129/56)  RR: 22 (07-11 @ 05:00) (16 - 32)  SpO2: 100% (07-11 @ 05:00) (99% - 100%)  07-09-19 @ 07:01  -  07-10-19 @ 07:00  --------------------------------------------------------  IN: 2779.8 mL / OUT: 3275 mL / NET: -495.2 mL    07-10-19 @ 07:01  -  07-11-19 @ 05:23  --------------------------------------------------------  IN: 2684.9 mL / OUT: 3665 mL / NET: -980.1 mL      General: alert and oriented, NAD  Resp: airway patent, respirations unlabored  CVS: regular rate and rhythm  Abdomen: soft, nontender, nondistended, stoma pink and viable with sweat in bag, abthera vac in place wtih good suction and serosanguinous drainage in canister  : starr in place with clear yellow urine in bag  Extremities: no edema  Skin: warm, dry, appropriate color                          7.6    29.3  )-----------( 204      ( 11 Jul 2019 03:35 )             22.9   07-11    139  |  97  |  56<H>  ----------------------------<  174<H>  3.3<L>   |  31  |  0.97    Ca    8.2<L>      11 Jul 2019 03:35  Phos  4.2     07-11  Mg     2.0     07-11

## 2019-07-11 NOTE — PROGRESS NOTE ADULT - ATTENDING COMMENTS
NEURO: acute pain control  - Pain control with Tylenol / dilaudid IV PRN   - off sedation    RESPIRATORY: intubated, PMHx of Asthma  - CXR AM blunting of costophrenic angles bilaterally  - on duoneb and pulmicort  - Received lasix and diamox overnight with plan to get neg 1-2 liters    CARDIOVASCULAR:  off pressors, tachycardia  - C/w diuresis Lasix     - metoprolol 2.5 q4h, continues to be tachycardic, increase  - Hold home anti-hypertensives  - monitor i/o, urine output has been adequate thus far at 60-100cc/hr and >300 when given lasix  - daily weight downtrending    GI/NUTRITION: s/p ex lap and L hemicolectomy with end colostomy and ABThera VAC placement and washout  - NPO, IVF, NGT   - continue TPN - maximum volume of 2L per day  - monitor for ostomy function: no stool yet  - RTOR today or tomorrow for possible closure w/ mesh   - non tender abdomen  - OR today for reeval of abdominal bridging mesh    GENITOURINARY/RENAL: DIANNA on CKD stage 3  - lasix + diamox continue until OR  - BUN Cr down today    HEMATOLOGIC: anemia, stable  - Patient does NOT accept blood products  - Started on Iron IV x 5days  - Minimize blood draws   - Lovenox for DVT ppx  - Hold ASA and A/C for now    INFECTIOUS DISEASE: Colonic perforation  - on imipenem   - afebrile  - UA negative 7/10  - f/u OR cultures speciation and sensitivities: enterococcus  - F/u AM WBC today  - f/u repeat blood cultures drawn yesterday    ENDOCRINE:  - off insulin drip this AM  - insulin in TPN 60 unit  - keep high SSI    MSK:  - PT   - OOB if extubated     Lines:  NG, starr, VAC, PIV, PICC NEURO: acute pain control  - Pain control with Tylenol / dilaudid IV PRN   - off sedation    RESPIRATORY: intubated, PMHx of Asthma  - CXR AM blunting of costophrenic angles bilaterally  - on duoneb and pulmicort  - Received lasix and diamox overnight with plan to get neg 1-2 liters    CARDIOVASCULAR:  off pressors, tachycardia  - C/w diuresis Lasix     - metoprolol 2.5 q4h, continues to be tachycardic, increased  - Hold home anti-hypertensives  - monitor i/o, urine output has been adequate thus far at 60-100cc/hr and >300 when given lasix  - daily weight downtrending    GI/NUTRITION: s/p ex lap and L hemicolectomy with end colostomy and ABThera VAC placement and washout  - NPO, IVF, NGT   - continue TPN - maximum volume of 2L per day  - monitor for ostomy function: no stool yet  - RTOR today or tomorrow for possible closure w/ mesh   - non tender abdomen  - OR today for reeval of abdominal bridging mesh    GENITOURINARY/RENAL: DIANNA on CKD stage 3  - lasix + diamox continue until OR  - BUN Cr down today    HEMATOLOGIC: anemia, stable  - Patient does NOT accept blood products  - Started on Iron IV x 5days  - Minimize blood draws   - Lovenox for DVT ppx  - Hold ASA and A/C for now    INFECTIOUS DISEASE: Colonic perforation  - on imipenem   - afebrile  - UA negative 7/10  - f/u OR cultures speciation and sensitivities: enterococcus  - F/u AM WBC today  - f/u repeat blood cultures drawn yesterday    ENDOCRINE:  - off insulin drip this AM  - insulin in TPN 60 unit  - keep high SSI    MSK:  - PT   - OOB if extubated     Lines:  NG, starr, VAC, PIV, PICC

## 2019-07-11 NOTE — PROGRESS NOTE ADULT - ASSESSMENT
74 year old female PMHx of HFrEF s/p AICD, asthma, HTN, HLD, atrial fibrillation on eliquis who presented to [facility] on 6/30 with diverticulitis at splenic flexure with perforation on repeat scan on 7/1.  On 7/2 patient underwent Exploratory laparotomy, partial colectomy, end colostomy, and placement of temporary abdominal VAC dressing. Intraoperatively was noted of having perforation of distal transverse colon with feculent peritonitis. On 7/3 patient underwent Reopening of abdominal wound and an abdominal washout. On 7/9 patient RTOR for Abthera Vac placement and was found to have viable Edematous bowel and minimal pus-like fluid in pelvis which was sent for culture. On 7/10 hypotensive to SBP in the 80's and broadened to Imipenem. Wound cultures from 7/2/19 with E. coli, AHS, Strep mitis and Enterococcus faecalis (amp susceptible).    Ciprofloxacin 6/30 - 7/1  Aztreonam 7/2 - 7/4  Flagyl 6/30 - 7/9  Ceftriaxone 7/4 - 7/9  Imipenem 7/10 -->>    Overall, feculent peritonitis and resolved septic shock secondary to perforated diverticulitis. 7/9 surgical cultures with enterococcus. Imipenem offers coverage for ampicillin susceptible enterococcus (7/2 culture Enterococcus was amp susceptible). Would continue current management    --Continue imipenem 500 mg IV Q6H  --F/U on surgical findings if patient is to RTOR  --F/U on 7/9 surgical cultures and susceptibility of enterococcus species  --F/U on 7/10 blood cultures     I will continue to follow. Please feel free to contact me with any further questions.    Rex Goode M.D.  [facility] Division of Infectious Disease  8AM-5PM: Pager Number [phone]  After Hours: Please contact the Infectious Diseases Office at [phone]

## 2019-07-11 NOTE — PROGRESS NOTE ADULT - ATTENDING COMMENTS
seen and examined 07-11-19 @ 1130    sepsis from perforated diverticulitis with intraabdominal hypertension  7/2 - Lupe's / ABThera  7/3 - washout / ABThera  7/5 - washout / ABThera  7/9 - washout / ABThera  7/11 - planned for abdominal closure with bridging mesh    insulin drip was stopped at 7 AM.    soft / NT / ND  colostomy bag empty  ABThera in place  bilateral LE edema 1/3 to knees      moderate protein calorie malnutrition  -continue TPN at goal    type 2 DM requiring insulin infusion  overnight  -increase insulin 45 -> 60 units    hypernatremia with fluid overload  -continue NaCl 0 mEq and NaAcetate 0 mEq  -continue total volume 2000 mL    hypokalemia (from sodium restriction and insulin infusion)  -increase KCl 60 -> 100 mEq

## 2019-07-11 NOTE — PROGRESS NOTE ADULT - ASSESSMENT
A/P: 74F with perforated diverticulitis s/p exploratory laparotomy, left hemicolectomy, end colostomy, and ABThera Vac placement, s/p 3x RTOR for abdominal washout and abthera vac placement.     - Plan for abdominal closure with bridging mesh today  - F/U intraabdominal fluid cultures  - Appreciate excellent SICU care    Green [id number].

## 2019-07-11 NOTE — PROGRESS NOTE ADULT - SUBJECTIVE AND OBJECTIVE BOX
SICU DAILY PROGRESS NOTE    74y Female PMHx of HFrEF s/p AICD, asthma, HTN, HLD, atrial fibrillation on eliquis p/w severe, sudden onset L sided abd pain which started yesterday morning after eating. She endorsed nausea at the time but no vomiting. She did have any fevers, chills, diarrhea or hematochezia.      On 6/30, CTAP w/ IV contrast showed proximal descending colon/splenic flexure diverticulitis with small amount of free fluid and 3cm low attenuation structure in the right adnexa ?fluid collection ?ovarian cyst. She developed dyspnea and wheezing after the CT scan and became hypoxic. Bedside US int he ED demonstrated a few B lines. She received 80mg total of IV lasix, 125 of solumedrol, duonebs x2 and narcan and she was admitted to MICU for management of respiratory and heart failure.     Through the next day (7/1), she was persistently tachycardic with BPs as low as 76/50, MAPs in low 60s. She was bolused with 500cc of crystalloid without improvement in blood pressure. She was then started on phenylephrine. Her lactate was uptrending from 2 to 2.6 to 3 on the arterial blood gas. A repeat CT scan showed "Acute diverticulitis at the splenic flexure with evidence of contained perforation. No drainable regional collection. Increased ascites in the deep pelvis." She was transferred to the SICU for further HD monitoring. Antibiotics were switched to Aztreonam and flagyl. Bedside ultrasound showed IV 1.5 minimally collapsable and echo consistent with euvolemic state. Antibiotics again switched to Ceftriaxone and flagyl. OGT switched to NGT. Patient became tachypneic during SBT on 7/6 so she could not be extubated at that time. She was kept intubated and s/p RTOR for washout on 7/9. Extubated on 7/10 to BiPaP.     24 HOUR EVENTS:  - Given Lasix 40 and diamox 250 and then extubated to Bipap  - Requiring increase in insulin in TPN to 45 units in addition to insulin gtt  - Given additional dose of lasix 40 and diamox 250 overnight    SUBJECTIVE/ROS:  [ ] A ten-point review of systems was otherwise negative except as noted.  [x] Due to altered mental status/intubation, subjective information were not able to be obtained from the patient. History was obtained, to the extent possible, from review of the chart and collateral sources of information.      NEURO  Exam: awake, alert, difficult speaking over BiPap but following commands  Meds: acetaminophen  IVPB .. 1000 milliGRAM(s) IV Intermittent once  HYDROmorphone  Injectable 0.5 milliGRAM(s) IV Push every 3 hours PRN pain    [x] Adequacy of sedation and pain control has been assessed and adjusted      RESPIRATORY  RR: 23 (07-11-19 @ 04:00) (16 - 32)  SpO2: 100% (07-11-19 @ 04:00) (99% - 100%)  Exam: unlabored, clear to auscultation bilaterally in the apices but some bilateral wheezing at the bases  Mechanical Ventilation: Mode: AC/ CMV (Assist Control/ Continuous Mandatory Ventilation), RR (machine): 16, RR (patient): 16, TV (machine): 450, FiO2: 40, PEEP: 5, PIP: 27  ABG - ( 10 Jul 2019 11:11 )  pH: 7.41  /  pCO2: 50    /  pO2: 163   / HCO3: 31    / Base Excess: 6.4   /  SaO2: 99      Lactate: x          [N/A] Extubation Readiness Assessed  Meds: ALBUTerol/ipratropium for Nebulization 3 milliLiter(s) Nebulizer every 6 hours  buDESOnide    Inhalation Suspension 0.5 milliGRAM(s) Inhalation every 12 hours    CARDIOVASCULAR  HR: 98 (07-11-19 @ 04:00) (86 - 122)  BP: 115/58 (07-11-19 @ 04:00) (83/46 - 126/59)  BP(mean): 83 (07-11-19 @ 04:00) (60 - 85)    Exam: regular rate and rhythm, intermittently tachycardic to 110s  Cardiac Rhythm: sinus  Perfusion     [x]Adequate   [ ]Inadequate  Mentation   [x]Normal       [ ]Reduced  Extremities  [x]Warm         [ ]Cool  Volume Status [ ]Hypervolemic [x]Euvolemic [ ]Hypovolemic  Meds: metoprolol tartrate Injectable 2.5 milliGRAM(s) IV Push every 6 hours    GI/NUTRITION  Exam:  soft, nontender, nondistended, abthera w/ good suction and ss output, ostomy w/ small amount of air  Diet: NPO NGT with TPN  Meds: pantoprazole  Injectable 40 milliGRAM(s) IV Push every 24 hours    GENITOURINARY  I&O's Detail    07-09 @ 07:01  -  07-10 @ 07:00  --------------------------------------------------------  IN:    Enteral Tube Flush: 220 mL    fat emulsion (Fish Oil and Plant Based) 20% Infusion: 233.8 mL    insulin regular Infusion: 33 mL    Solution: 350 mL    Solution: 200 mL    TPN (Total Parenteral Nutrition): 1743 mL  Total IN: 2779.8 mL    OUT:    Nasoenteral Tube: 800 mL    Ureteral Catheter: 1975 mL    VAC (Vacuum Assisted Closure) System: 500 mL  Total OUT: 3275 mL    Total NET: -495.2 mL      07-10 @ 07:01 - 07-11 @ 04:23  --------------------------------------------------------  IN:    fat emulsion (Fish Oil and Plant Based) 20% Infusion: 183.7 mL    insulin regular Infusion: 3 mL    insulin regular Infusion: 48.5 mL    Solution: 400 mL    Solution: 100 mL    Solution: 100 mL    TPN (Total Parenteral Nutrition): 1743 mL  Total IN: 2578.2 mL    OUT:    Nasoenteral Tube: 225 mL    Ureteral Catheter: 3015 mL    VAC (Vacuum Assisted Closure) System: 300 mL  Total OUT: 3540 mL    Total NET: -961.8 mL    07-11    139  |  97  |  56<H>  ----------------------------<  174<H>  3.3<L>   |  31  |  0.97    Ca    8.2<L>      11 Jul 2019 03:35  Phos  4.2     07-11  Mg     2.0     07-11    [x] Ching catheter, indication: N/A  Meds: fat emulsion (Fish Oil and Plant Based) 20% Infusion 16.7 mL/Hr IV Continuous <Continuous>  iron sucrose IVPB 200 milliGRAM(s) IV Intermittent every 24 hours  Parenteral Nutrition - Adult 1 Each TPN Continuous <Continuous>  potassium chloride  20 mEq/100 mL IVPB 20 milliEquivalent(s) IV Intermittent every 2 hours    HEMATOLOGIC  Meds: enoxaparin Injectable 40 milliGRAM(s) SubCutaneous daily    [x] VTE Prophylaxis                        7.6    29.3  )-----------( 204      ( 11 Jul 2019 03:35 )             22.9       Transfusion     [ ] PRBC   [ ] Platelets   [ ] FFP   [ ] Cryoprecipitate      INFECTIOUS DISEASES  WBC Count: 29.3 K/uL (07-11 @ 03:35)  WBC Count: 24.6 K/uL (07-11 @ 02:00)    RECENT CULTURES:  Specimen Source: .Body Fluid abdominal fluid  Date/Time: 07-09 @ 17:54  Culture Results:   Few Enterococcus species  Gram Stain:   polymorphonuclear leukocytes seen  No organisms seen  by cytocentrifuge  Organism: --  Specimen Source: .Body Fluid abdominal fluid  Date/Time: 07-09 @ 17:46  Culture Results:   Few Enterococcus species  Gram Stain:   polymorphonuclear leukocytes seen  No organisms seen  by cytocentrifuge  Organism: --    Meds: imipenem/cilastatin  IVPB 500 milliGRAM(s) IV Intermittent every 6 hours    ENDOCRINE  CAPILLARY BLOOD GLUCOSE      POCT Blood Glucose.: 173 mg/dL (11 Jul 2019 04:04)  POCT Blood Glucose.: 197 mg/dL (11 Jul 2019 03:07)  POCT Blood Glucose.: 207 mg/dL (11 Jul 2019 01:58)  POCT Blood Glucose.: 222 mg/dL (11 Jul 2019 01:03)  POCT Blood Glucose.: 229 mg/dL (10 Jul 2019 23:56)  POCT Blood Glucose.: 218 mg/dL (10 Jul 2019 23:01)  POCT Blood Glucose.: 224 mg/dL (10 Jul 2019 22:02)  POCT Blood Glucose.: 240 mg/dL (10 Jul 2019 21:02)  POCT Blood Glucose.: 267 mg/dL (10 Jul 2019 19:56)  POCT Blood Glucose.: 265 mg/dL (10 Jul 2019 17:02)  POCT Blood Glucose.: 235 mg/dL (10 Jul 2019 13:23)  POCT Blood Glucose.: 231 mg/dL (10 Jul 2019 09:07)  POCT Blood Glucose.: 212 mg/dL (10 Jul 2019 05:26)    Meds: insulin regular Infusion 3 Unit(s)/Hr IV Continuous <Continuous>    ACCESS DEVICES:  [x] Peripheral IV  [ ] Central Venous Line	[ ] R	[ ] L	[ ] IJ	[ ] Fem	[ ] SC	Placed:   [ ] Arterial Line		[ ] R	[ ] L	[ ] Fem	[ ] Rad	[ ] Ax	Placed:   [x] PICC:					[ ] Mediport  [x] Urinary Catheter, Date Placed:   [x] Necessity of urinary, arterial, and venous catheters discussed    OTHER MEDICATIONS:  benzocaine 15 mG/menthol 3.6 mG Lozenge 1 Lozenge Oral three times a day PRN  chlorhexidine 0.12% Liquid 15 milliLiter(s) Oral Mucosa <User Schedule>  chlorhexidine 2% Cloths 1 Application(s) Topical daily

## 2019-07-11 NOTE — BRIEF OPERATIVE NOTE - NSICDXBRIEFPREOP_GEN_ALL_CORE_FT
PRE-OP DIAGNOSIS:  Perforated diverticulum of large intestine 02-Jul-2019 14:13:51  Aleksandr Perez

## 2019-07-12 LAB
-  AMPICILLIN/SULBACTAM: SIGNIFICANT CHANGE UP
-  AMPICILLIN/SULBACTAM: SIGNIFICANT CHANGE UP
-  CEFAZOLIN: SIGNIFICANT CHANGE UP
-  CEFAZOLIN: SIGNIFICANT CHANGE UP
-  CLINDAMYCIN: SIGNIFICANT CHANGE UP
-  CLINDAMYCIN: SIGNIFICANT CHANGE UP
-  ERYTHROMYCIN: SIGNIFICANT CHANGE UP
-  ERYTHROMYCIN: SIGNIFICANT CHANGE UP
-  GENTAMICIN: SIGNIFICANT CHANGE UP
-  GENTAMICIN: SIGNIFICANT CHANGE UP
-  OXACILLIN: SIGNIFICANT CHANGE UP
-  OXACILLIN: SIGNIFICANT CHANGE UP
-  PENICILLIN: SIGNIFICANT CHANGE UP
-  PENICILLIN: SIGNIFICANT CHANGE UP
-  RIFAMPIN: SIGNIFICANT CHANGE UP
-  RIFAMPIN: SIGNIFICANT CHANGE UP
-  TETRACYCLINE: SIGNIFICANT CHANGE UP
-  TETRACYCLINE: SIGNIFICANT CHANGE UP
-  TRIMETHOPRIM/SULFAMETHOXAZOLE: SIGNIFICANT CHANGE UP
-  TRIMETHOPRIM/SULFAMETHOXAZOLE: SIGNIFICANT CHANGE UP
-  VANCOMYCIN: SIGNIFICANT CHANGE UP
-  VANCOMYCIN: SIGNIFICANT CHANGE UP
ANION GAP SERPL CALC-SCNC: 11 MMOL/L — SIGNIFICANT CHANGE UP (ref 5–17)
BASE EXCESS BLDV CALC-SCNC: 4.1 MMOL/L — HIGH (ref -2–2)
BUN SERPL-MCNC: 58 MG/DL — HIGH (ref 7–23)
CA-I SERPL-SCNC: 1.2 MMOL/L — SIGNIFICANT CHANGE UP (ref 1.12–1.3)
CALCIUM SERPL-MCNC: 8.2 MG/DL — LOW (ref 8.4–10.5)
CHLORIDE BLDV-SCNC: 103 MMOL/L — SIGNIFICANT CHANGE UP (ref 96–108)
CHLORIDE SERPL-SCNC: 98 MMOL/L — SIGNIFICANT CHANGE UP (ref 96–108)
CO2 BLDV-SCNC: 31 MMOL/L — HIGH (ref 22–30)
CO2 SERPL-SCNC: 26 MMOL/L — SIGNIFICANT CHANGE UP (ref 22–31)
CREAT SERPL-MCNC: 0.82 MG/DL — SIGNIFICANT CHANGE UP (ref 0.5–1.3)
GAS PNL BLDA: SIGNIFICANT CHANGE UP
GAS PNL BLDV: 131 MMOL/L — LOW (ref 135–145)
GAS PNL BLDV: SIGNIFICANT CHANGE UP
GAS PNL BLDV: SIGNIFICANT CHANGE UP
GLUCOSE BLDC GLUCOMTR-MCNC: 103 MG/DL — HIGH (ref 70–99)
GLUCOSE BLDC GLUCOMTR-MCNC: 112 MG/DL — HIGH (ref 70–99)
GLUCOSE BLDC GLUCOMTR-MCNC: 120 MG/DL — HIGH (ref 70–99)
GLUCOSE BLDC GLUCOMTR-MCNC: 127 MG/DL — HIGH (ref 70–99)
GLUCOSE BLDC GLUCOMTR-MCNC: 153 MG/DL — HIGH (ref 70–99)
GLUCOSE BLDC GLUCOMTR-MCNC: 168 MG/DL — HIGH (ref 70–99)
GLUCOSE BLDC GLUCOMTR-MCNC: 189 MG/DL — HIGH (ref 70–99)
GLUCOSE BLDC GLUCOMTR-MCNC: 198 MG/DL — HIGH (ref 70–99)
GLUCOSE BLDC GLUCOMTR-MCNC: 201 MG/DL — HIGH (ref 70–99)
GLUCOSE BLDC GLUCOMTR-MCNC: 207 MG/DL — HIGH (ref 70–99)
GLUCOSE BLDC GLUCOMTR-MCNC: 208 MG/DL — HIGH (ref 70–99)
GLUCOSE BLDC GLUCOMTR-MCNC: 208 MG/DL — HIGH (ref 70–99)
GLUCOSE BLDC GLUCOMTR-MCNC: 232 MG/DL — HIGH (ref 70–99)
GLUCOSE BLDC GLUCOMTR-MCNC: 234 MG/DL — HIGH (ref 70–99)
GLUCOSE BLDC GLUCOMTR-MCNC: 80 MG/DL — SIGNIFICANT CHANGE UP (ref 70–99)
GLUCOSE BLDC GLUCOMTR-MCNC: 86 MG/DL — SIGNIFICANT CHANGE UP (ref 70–99)
GLUCOSE BLDC GLUCOMTR-MCNC: 89 MG/DL — SIGNIFICANT CHANGE UP (ref 70–99)
GLUCOSE BLDC GLUCOMTR-MCNC: 90 MG/DL — SIGNIFICANT CHANGE UP (ref 70–99)
GLUCOSE BLDC GLUCOMTR-MCNC: 92 MG/DL — SIGNIFICANT CHANGE UP (ref 70–99)
GLUCOSE BLDC GLUCOMTR-MCNC: 97 MG/DL — SIGNIFICANT CHANGE UP (ref 70–99)
GLUCOSE BLDV-MCNC: 200 MG/DL — HIGH (ref 70–99)
GLUCOSE SERPL-MCNC: 225 MG/DL — HIGH (ref 70–99)
HCO3 BLDV-SCNC: 29 MMOL/L — SIGNIFICANT CHANGE UP (ref 21–29)
HCT VFR BLD CALC: 24.1 % — LOW (ref 34.5–45)
HCT VFR BLDA CALC: 24 % — LOW (ref 39–50)
HGB BLD CALC-MCNC: 7.6 G/DL — LOW (ref 11.5–15.5)
HGB BLD-MCNC: 8.2 G/DL — LOW (ref 11.5–15.5)
HOROWITZ INDEX BLDV+IHG-RTO: 40 — SIGNIFICANT CHANGE UP
LACTATE BLDV-MCNC: 1 MMOL/L — SIGNIFICANT CHANGE UP (ref 0.7–2)
MAGNESIUM SERPL-MCNC: 1.8 MG/DL — SIGNIFICANT CHANGE UP (ref 1.6–2.6)
MCHC RBC-ENTMCNC: 32.7 PG — SIGNIFICANT CHANGE UP (ref 27–34)
MCHC RBC-ENTMCNC: 34.1 GM/DL — SIGNIFICANT CHANGE UP (ref 32–36)
MCV RBC AUTO: 95.8 FL — SIGNIFICANT CHANGE UP (ref 80–100)
METHOD TYPE: SIGNIFICANT CHANGE UP
METHOD TYPE: SIGNIFICANT CHANGE UP
OTHER CELLS CSF MANUAL: 8 ML/DL — LOW (ref 18–22)
PCO2 BLDV: 50 MMHG — SIGNIFICANT CHANGE UP (ref 35–50)
PH BLDV: 7.38 — SIGNIFICANT CHANGE UP (ref 7.35–7.45)
PHOSPHATE SERPL-MCNC: 3.6 MG/DL — SIGNIFICANT CHANGE UP (ref 2.5–4.5)
PLATELET # BLD AUTO: 244 K/UL — SIGNIFICANT CHANGE UP (ref 150–400)
PO2 BLDV: 44 MMHG — SIGNIFICANT CHANGE UP (ref 25–45)
POTASSIUM BLDV-SCNC: 3.9 MMOL/L — SIGNIFICANT CHANGE UP (ref 3.5–5.3)
POTASSIUM SERPL-MCNC: 4.1 MMOL/L — SIGNIFICANT CHANGE UP (ref 3.5–5.3)
POTASSIUM SERPL-SCNC: 4.1 MMOL/L — SIGNIFICANT CHANGE UP (ref 3.5–5.3)
RBC # BLD: 2.51 M/UL — LOW (ref 3.8–5.2)
RBC # FLD: 15.1 % — HIGH (ref 10.3–14.5)
SAO2 % BLDV: 71 % — SIGNIFICANT CHANGE UP (ref 67–88)
SODIUM SERPL-SCNC: 135 MMOL/L — SIGNIFICANT CHANGE UP (ref 135–145)
WBC # BLD: 23.9 K/UL — HIGH (ref 3.8–10.5)
WBC # FLD AUTO: 23.9 K/UL — HIGH (ref 3.8–10.5)

## 2019-07-12 PROCEDURE — 93284 PRGRMG EVAL IMPLANTABLE DFB: CPT | Mod: 26

## 2019-07-12 PROCEDURE — 93010 ELECTROCARDIOGRAM REPORT: CPT

## 2019-07-12 PROCEDURE — 99232 SBSQ HOSP IP/OBS MODERATE 35: CPT

## 2019-07-12 PROCEDURE — 93010 ELECTROCARDIOGRAM REPORT: CPT | Mod: 77

## 2019-07-12 PROCEDURE — 71045 X-RAY EXAM CHEST 1 VIEW: CPT | Mod: 26

## 2019-07-12 PROCEDURE — 99233 SBSQ HOSP IP/OBS HIGH 50: CPT | Mod: GC

## 2019-07-12 RX ORDER — ACETAMINOPHEN 500 MG
1000 TABLET ORAL ONCE
Refills: 0 | Status: COMPLETED | OUTPATIENT
Start: 2019-07-13 | End: 2019-07-13

## 2019-07-12 RX ORDER — INSULIN HUMAN 100 [IU]/ML
2 INJECTION, SOLUTION SUBCUTANEOUS ONCE
Refills: 0 | Status: COMPLETED | OUTPATIENT
Start: 2019-07-12 | End: 2019-07-12

## 2019-07-12 RX ORDER — INSULIN LISPRO 100/ML
VIAL (ML) SUBCUTANEOUS EVERY 4 HOURS
Refills: 0 | Status: DISCONTINUED | OUTPATIENT
Start: 2019-07-12 | End: 2019-07-14

## 2019-07-12 RX ORDER — DEXMEDETOMIDINE HYDROCHLORIDE IN 0.9% SODIUM CHLORIDE 4 UG/ML
0.2 INJECTION INTRAVENOUS
Qty: 200 | Refills: 0 | Status: DISCONTINUED | OUTPATIENT
Start: 2019-07-12 | End: 2019-07-12

## 2019-07-12 RX ORDER — ACETAMINOPHEN 500 MG
1000 TABLET ORAL ONCE
Refills: 0 | Status: COMPLETED | OUTPATIENT
Start: 2019-07-12 | End: 2019-07-12

## 2019-07-12 RX ORDER — METOPROLOL TARTRATE 50 MG
5 TABLET ORAL EVERY 6 HOURS
Refills: 0 | Status: DISCONTINUED | OUTPATIENT
Start: 2019-07-12 | End: 2019-07-14

## 2019-07-12 RX ORDER — ELECTROLYTE SOLUTION,INJ
1 VIAL (ML) INTRAVENOUS
Refills: 0 | Status: DISCONTINUED | OUTPATIENT
Start: 2019-07-12 | End: 2019-07-12

## 2019-07-12 RX ORDER — MAGNESIUM SULFATE 500 MG/ML
2 VIAL (ML) INJECTION ONCE
Refills: 0 | Status: COMPLETED | OUTPATIENT
Start: 2019-07-12 | End: 2019-07-12

## 2019-07-12 RX ORDER — INSULIN HUMAN 100 [IU]/ML
2 INJECTION, SOLUTION SUBCUTANEOUS
Qty: 100 | Refills: 0 | Status: DISCONTINUED | OUTPATIENT
Start: 2019-07-12 | End: 2019-07-12

## 2019-07-12 RX ORDER — I.V. FAT EMULSION 20 G/100ML
16.7 EMULSION INTRAVENOUS
Qty: 40 | Refills: 0 | Status: DISCONTINUED | OUTPATIENT
Start: 2019-07-12 | End: 2019-07-13

## 2019-07-12 RX ADMIN — Medication 0.5 MILLIGRAM(S): at 18:49

## 2019-07-12 RX ADMIN — PANTOPRAZOLE SODIUM 40 MILLIGRAM(S): 20 TABLET, DELAYED RELEASE ORAL at 05:03

## 2019-07-12 RX ADMIN — Medication 1000 MILLIGRAM(S): at 21:05

## 2019-07-12 RX ADMIN — INSULIN HUMAN 2 UNIT(S): 100 INJECTION, SOLUTION SUBCUTANEOUS at 02:09

## 2019-07-12 RX ADMIN — Medication 5 MILLIGRAM(S): at 23:59

## 2019-07-12 RX ADMIN — IMIPENEM AND CILASTATIN 100 MILLIGRAM(S): 250; 250 INJECTION, POWDER, FOR SOLUTION INTRAVENOUS at 12:22

## 2019-07-12 RX ADMIN — Medication 400 MILLIGRAM(S): at 14:17

## 2019-07-12 RX ADMIN — Medication 50 GRAM(S): at 04:36

## 2019-07-12 RX ADMIN — Medication 400 MILLIGRAM(S): at 19:34

## 2019-07-12 RX ADMIN — INSULIN HUMAN 2 UNIT(S)/HR: 100 INJECTION, SOLUTION SUBCUTANEOUS at 02:08

## 2019-07-12 RX ADMIN — Medication 3 MILLILITER(S): at 05:35

## 2019-07-12 RX ADMIN — Medication 0.5 MILLIGRAM(S): at 05:35

## 2019-07-12 RX ADMIN — HYDROMORPHONE HYDROCHLORIDE 0.5 MILLIGRAM(S): 2 INJECTION INTRAMUSCULAR; INTRAVENOUS; SUBCUTANEOUS at 01:33

## 2019-07-12 RX ADMIN — CHLORHEXIDINE GLUCONATE 15 MILLILITER(S): 213 SOLUTION TOPICAL at 05:02

## 2019-07-12 RX ADMIN — CHLORHEXIDINE GLUCONATE 1 APPLICATION(S): 213 SOLUTION TOPICAL at 11:48

## 2019-07-12 RX ADMIN — IMIPENEM AND CILASTATIN 100 MILLIGRAM(S): 250; 250 INJECTION, POWDER, FOR SOLUTION INTRAVENOUS at 05:02

## 2019-07-12 RX ADMIN — ENOXAPARIN SODIUM 40 MILLIGRAM(S): 100 INJECTION SUBCUTANEOUS at 11:46

## 2019-07-12 RX ADMIN — IMIPENEM AND CILASTATIN 100 MILLIGRAM(S): 250; 250 INJECTION, POWDER, FOR SOLUTION INTRAVENOUS at 23:58

## 2019-07-12 RX ADMIN — IRON SUCROSE 110 MILLIGRAM(S): 20 INJECTION, SOLUTION INTRAVENOUS at 18:00

## 2019-07-12 RX ADMIN — HYDROMORPHONE HYDROCHLORIDE 0.5 MILLIGRAM(S): 2 INJECTION INTRAMUSCULAR; INTRAVENOUS; SUBCUTANEOUS at 01:18

## 2019-07-12 RX ADMIN — IMIPENEM AND CILASTATIN 100 MILLIGRAM(S): 250; 250 INJECTION, POWDER, FOR SOLUTION INTRAVENOUS at 18:01

## 2019-07-12 RX ADMIN — Medication 3 MILLILITER(S): at 11:54

## 2019-07-12 RX ADMIN — Medication 3 MILLILITER(S): at 23:43

## 2019-07-12 RX ADMIN — Medication 1000 MILLIGRAM(S): at 14:37

## 2019-07-12 RX ADMIN — Medication 5 MILLIGRAM(S): at 05:02

## 2019-07-12 RX ADMIN — DEXMEDETOMIDINE HYDROCHLORIDE IN 0.9% SODIUM CHLORIDE 4.16 MICROGRAM(S)/KG/HR: 4 INJECTION INTRAVENOUS at 01:18

## 2019-07-12 RX ADMIN — I.V. FAT EMULSION 16.7 ML/HR: 20 EMULSION INTRAVENOUS at 19:34

## 2019-07-12 RX ADMIN — Medication 3 MILLILITER(S): at 18:49

## 2019-07-12 RX ADMIN — Medication 5 MILLIGRAM(S): at 11:46

## 2019-07-12 RX ADMIN — Medication 5 MILLIGRAM(S): at 18:02

## 2019-07-12 RX ADMIN — Medication 1 EACH: at 19:34

## 2019-07-12 NOTE — PROGRESS NOTE ADULT - PROBLEM SELECTOR PLAN 3
- Management as above  - Home medications of enalapril and spironolactone currently on hold in setting of recent vasodilatory shock, currently off pressors with occasional hypotension, continue to hold  - brief runs of NSVT; on lopressor, would be cautious with uptitration due to history of bronchospasm.  - would continue diuresis to keep net negative

## 2019-07-12 NOTE — PROGRESS NOTE ADULT - ATTENDING COMMENTS
74yr F open abdomen s/p stratos mesh over with VAC applied.     - post op labs reassuring  - had tachyarrythmia intraop, EP eval today, sinus tach, no concern for pacer malfunction  - continue TPN for now, plan for tube feeds Monday  - extubated today to Bipap, attempt to wean  - imipenem post op  - increase insulin to 70 units  - work with PT. cont to monitor for skin breakdown in her back.

## 2019-07-12 NOTE — PROGRESS NOTE ADULT - ASSESSMENT
ASSESSMENT  74F admitted with abdominal pain and exam findings consistent with peritonitis secondary to acute diverticulitis at the splenic flexure with evidence of contained perforation s/p Exploratory laparotomy, left hemicolectomy, end colostomy, and ABThera Vac placement (7/2) and s/p multiple RTOR for abdominal washout and abthera vac placement (7/3, 7/5, 7/9), closure with strattice mesh and wound vac placement (7/11)    PLAN:  NEURO: acute pain control  - Pain control with Dilaudid PRN   - Precedex while intubated    RESPIRATORY: intubated, PMHx of Asthma  - Continue mechanical ventilation, attempt SBT in AM and wean to extubate as tolerated  - AM CXR to assess pulmonary vascular congestion  - Continue Duonebs and pulmicort  - Continue diuresis as needed    CARDIOVASCULAR:  atrial fibrillation, HFrEF s/p AICD, non-sustained runs of V-tach   - Metoprolol 5mg q 6hrs   - Hold home anti-hypertensives  - Trend daily weight     GI/NUTRITION: s/p ex lap and L hemicolectomy with end colostomy and ABThera VAC placement and washout 7/2, s/p closure with strattice mesh and wound vac 7/11  - NPO, NGT   - Continue TPN   - Protonix for stress ulcer prophylaxis  - Monitor for ostomy function      GENITOURINARY/RENAL: CKD stage 3  - Ching for strict I&Os  - Continue diuresis as needed    HEMATOLOGIC:  * Patient is Gnosticist & does NOT accept blood products*  - Started on Iron IV x 5days  - Minimize blood draws   - Lovenox for DVT ppx  - Hold ASA and A/C    INFECTIOUS DISEASE: Enterococcus in abdominal fluid culture  - Continue imipenem   - Repeat blood cultures if febrile    ENDOCRINE: DM   - Insulin drip for hyperglycemia  - insulin in TPN 60 unit    MSK:   - PT   - OOB if extubated     DISPO: SICU. Full code.    -Trini Santillan PA-C  [id number]

## 2019-07-12 NOTE — PROGRESS NOTE ADULT - SUBJECTIVE AND OBJECTIVE BOX
HISTORY  74y Female with history of HFrEF s/p AICD, asthma, HTN, HLD, atrial fibrillation on Eliquis presented with severe, sudden onset L sided abd pain which started on the morning of 6/30/19 after eating. She endorsed nausea at the time but no vomiting. She did not have any fevers, chills, diarrhea or hematochezia.  CTAP w/ IV contrast showed proximal descending colon/splenic flexure diverticulitis with small amount of free fluid and 3cm low attenuation structure in the right adnexa. She developed dyspnea and wheezing after the CT scan and became hypoxic. Bedside US int he ED demonstrated a few B lines. She received 80mg total of IV L.asix, 125 of solumedrol, Duonebs x2 and Narcan and she was admitted to MICU for management of respiratory and heart failure.     Through the next day (7/1), she was persistently tachycardic with BPs as low as 76/50, MAPs in low 60s. She was bolused with 500cc of crystalloid without improvement in blood pressure. She was then started on phenylephrine. Her lactate was uptrending from 2 to 2.6 to 3 on the arterial blood gas. A repeat CT scan showed acute diverticulitis at the splenic flexure with evidence of contained perforation, but no drainable collection. 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 Pt was left open with Abthera vac. She was taken back to the OR multiple times 7/3, 7/5, and 7/9) for washout of purulent ascites, but was unable to be closed secondary to edematous bowel. She was diuresed and then on 7/11 she was taken to OR and closed with strattice mesh. 2 Wolfgang drains left in place, and wound vac placed over mesh. She returned to SICU intubated on 7/11.      24 HOUR EVENTS: - Diuresed with 40mg IV Lasix and 250mg Diamox.   - Pt taken back to OR and closed with strattice mesh. 2 Wolfgang drains left in place, and wound vac placed over mesh.   - She returned to SICU intubated on 7/11, placed on Precedex for agitation.  - Insulin in TPN increased to 60 units, but had to go on insulin drip overnight for hyperglycemia.  - Pt with runs of non-sustained V-tach overnight (asymptomatic), metoprolol increased to 5mg q6 hrs.    SUBJECTIVE/ROS:  [ ] A ten-point review of systems was otherwise negative except as noted.  [ x] Due to altered mental status/intubation, subjective information were not able to be obtained from the patient. History was obtained, to the extent possible, from review of the chart and collateral sources of information.      NEURO  RASS:  0     Exam: sedated, arousable  Meds: dexmedetomidine Infusion 0.2 MICROgram(s)/kG/Hr IV Continuous <Continuous>  HYDROmorphone  Injectable 0.5 milliGRAM(s) IV Push every 3 hours PRN pain    [x] Adequacy of sedation and pain control has been assessed and adjusted      RESPIRATORY  RR: 26 (07-12-19 @ 01:00) (12 - 51)  SpO2: 100% (07-12-19 @ 01:00) (97% - 100%)  Exam: unlabored, clear to auscultation bilaterally  Mechanical Ventilation: Mode: AC/ CMV (Assist Control/ Continuous Mandatory Ventilation), RR (machine): 16, RR (patient): 25, TV (machine): 450, FiO2: 40, PEEP: 5, ITime: 0.9, MAP: 7, PIP: 18  ABG - ( 10 Jul 2019 11:11 )  pH: 7.41  /  pCO2: 50    /  pO2: 163   / HCO3: 31    / Base Excess: 6.4   /  SaO2: 99      Blood Gas Venous - Lactate: 1.2 mmoL/L (07.11.19 @ 21:03)    [x ] Extubation Readiness Assessed  Meds: ALBUTerol/ipratropium for Nebulization 3 milliLiter(s) Nebulizer every 6 hours  buDESOnide    Inhalation Suspension 0.5 milliGRAM(s) Inhalation every 12 hours        CARDIOVASCULAR  HR: 121 (07-12-19 @ 01:00) (86 - 127)  BP: 116/59 (07-12-19 @ 01:00) (101/49 - 188/77)  BP(mean): 79 (07-12-19 @ 01:00) (70 - 112)  VBG - ( 11 Jul 2019 21:03 )  pH: 7.29  /  pCO2: 63    /  pO2: 50    / HCO3: 29    / Base Excess: 2.6   /  SaO2: 77     Lactate: 1.2      Exam: irregular  Cardiac Rhythm: afib with PVCs and nonsustained Vtach  Perfusion     [x ]Adequate   [ ]Inadequate  Mentation   [ ]Normal       [x ]Reduced  Extremities  [x ]Warm         [ ]Cool  Volume Status [ x]Hypervolemic [ ]Euvolemic [ ]Hypovolemic  Meds: metoprolol tartrate Injectable 5 milliGRAM(s) IV Push every 6 hours        GI/NUTRITION  Exam: soft, nondistended, periincisional tenderness, VAC in place, 2 left JPs serosanguinous  Diet: NPO  Meds: pantoprazole  Injectable 40 milliGRAM(s) IV Push every 24 hours      GENITOURINARY  I&O's Detail    07-10 @ 07:01  -  07-11 @ 07:00  --------------------------------------------------------  IN:    fat emulsion (Fish Oil and Plant Based) 20% Infusion: 233.8 mL    insulin regular Infusion: 69.5 mL    insulin regular Infusion: 3 mL    Solution: 100 mL    Solution: 200 mL    Solution: 200 mL    Solution: 400 mL    TPN (Total Parenteral Nutrition): 1992 mL  Total IN: 3198.3 mL    OUT:    Nasoenteral Tube: 450 mL    Ureteral Catheter: 3300 mL    VAC (Vacuum Assisted Closure) System: 400 mL  Total OUT: 4150 mL    Total NET: -951.7 mL      07-11 @ 07:01 - 07-12 @ 01:27  --------------------------------------------------------  IN:    fat emulsion (Fish Oil and Plant Based) 20% Infusion: 116.9 mL    insulin regular Infusion: 7 mL    Solution: 100 mL    Solution: 100 mL    TPN (Total Parenteral Nutrition): 1494 mL  Total IN: 1817.9 mL    OUT:    Nasoenteral Tube: 200 mL    Ureteral Catheter: 1960 mL  Total OUT: 2160 mL    Total NET: -342.1 mL          07-11    132<L>  |  97  |  54<H>  ----------------------------<  244<H>  4.2   |  26  |  0.84    Ca    8.8      11 Jul 2019 21:11  Phos  4.9     07-11  Mg     2.0     07-11      [x ] Ching catheter, indication: monitoring in critically ill  Meds: fat emulsion (Fish Oil and Plant Based) 20% Infusion 16.7 mL/Hr IV Continuous <Continuous>  iron sucrose IVPB 200 milliGRAM(s) IV Intermittent every 24 hours  Parenteral Nutrition - Adult 1 Each TPN Continuous <Continuous>        HEMATOLOGIC  Meds: enoxaparin Injectable 40 milliGRAM(s) SubCutaneous daily    [x] VTE Prophylaxis                        8.7    32.3  )-----------( 318      ( 11 Jul 2019 21:11 )             28.1       Transfusion     [ ] PRBC   [ ] Platelets   [ ] FFP   [ ] Cryoprecipitate      INFECTIOUS DISEASES  T(C): 36.9 (07-11-19 @ 23:00), Max: 36.9 (07-11-19 @ 23:00)  WBC Count: 32.3 K/uL (07-11 @ 21:11)  WBC Count: 29.8 K/uL (07-11 @ 19:49)  WBC Count: 29.3 K/uL (07-11 @ 03:35)  WBC Count: 24.6 K/uL (07-11 @ 02:00)    Recent Cultures:  Specimen Source: .Blood, 07-10 @ 20:09; Results   No growth to date.; Gram Stain: --; Organism: --  Specimen Source: .Blood, 07-10 @ 20:08; Results   No growth to date.; Gram Stain: --; Organism: --  Specimen Source: .Body Fluid abdominal fluid, 07-09 @ 17:54; Results   Few Enterococcus faecalis  Few Coag Negative Staphylococcus; Gram Stain:   polymorphonuclear leukocytes seen  No organisms seen  by cytocentrifuge; Organism: Enterococcus faecalis  Specimen Source: .Body Fluid abdominal fluid, 07-09 @ 17:46; Results   Few Enterococcus faecalis  Few Coag Negative Staphylococcus; Gram Stain:   polymorphonuclear leukocytes seen  No organisms seen  by cytocentrifuge; Organism: Enterococcus faecalis    Meds: imipenem/cilastatin  IVPB 500 milliGRAM(s) IV Intermittent every 6 hours        ENDOCRINE  Capillary Blood Glucose  POCT Blood Glucose.: 232 mg/dL (12 Jul 2019 01:28)  POCT Blood Glucose.: 229 mg/dL (11 Jul 2019 22:12)  POCT Blood Glucose.: 154 mg/dL (11 Jul 2019 13:27)  POCT Blood Glucose.: 113 mg/dL (11 Jul 2019 11:14)  POCT Blood Glucose.: 94 mg/dL (11 Jul 2019 10:53)  POCT Blood Glucose.: 104 mg/dL (11 Jul 2019 09:55)    Meds: insulin lispro (HumaLOG) corrective regimen sliding scale   SubCutaneous every 4 hours        ACCESS DEVICES:  [x ] Peripheral IV  [ ] Central Venous Line	[ ] R	[ ] L	[ ] IJ	[ ] Fem	[ ] SC	Placed:   [ ] Arterial Line		[ ] R	[ ] L	[ ] Fem	[ ] Rad	[ ] Ax	Placed:   [x ] PICC: R basilic 7/5					[ ] Mediport  [ x] Urinary Catheter, Date Placed: 7/1  [x ] Necessity of urinary, arterial, and venous catheters discussed    OTHER MEDICATIONS:  benzocaine 15 mG/menthol 3.6 mG Lozenge 1 Lozenge Oral three times a day PRN  chlorhexidine 0.12% Liquid 15 milliLiter(s) Oral Mucosa <User Schedule>  chlorhexidine 0.12% Liquid 15 milliLiter(s) Oral Mucosa every 12 hours  chlorhexidine 2% Cloths 1 Application(s) Topical daily      CODE STATUS: full code    IMAGING: x

## 2019-07-12 NOTE — PROGRESS NOTE ADULT - SUBJECTIVE AND OBJECTIVE BOX
[facility] NUTRITION SUPPORT / TPN -- FOLLOW UP NOTE  --------------------------------------------------------------------------------    24 hour events/subjective:    - Pt taken back to OR and closed with strattice mesh. 2 Wolfgang drains left in place, and wound vac placed over mesh.   - She returned to SICU intubated on 7/11, placed on Precedex for agitation- Pt recently extubated and on supplemental O2  - Insulin in TPN increased to 60 units, but had to go on insulin drip overnight for hyperglycemia.  - Pt with runs of non-sustained V-tach overnight (asymptomatic), metoprolol increased to 5mg q6 hrs.      Diet:  Diet, NPO:   Except Medications (07-09-19 @ 14:09)      Appetite: [  x]Poor [  ]Adequate [  ]Good  Caloric intake:  [ x  ]  Adequate   [   ] Inadequate    ROS: General/ GI see HPI  all other systems negative      ALLERGIES & MEDICATIONS  --------------------------------------------------------------------------------  ALLERGIES  Coreg (Other)  digoxin (Other; Short breath (Mild to Mod))  Lopressor (Short breath)  penicillins (Hives)    INTOLERANCES  metoprolol (Other)  Solu-Medrol (Other (Mild to Mod))    STANDING INPATIENT MEDICATIONS    acetaminophen  IVPB .. 1000 milliGRAM(s) IV Intermittent once  ALBUTerol/ipratropium for Nebulization 3 milliLiter(s) Nebulizer every 6 hours  buDESOnide    Inhalation Suspension 0.5 milliGRAM(s) Inhalation every 12 hours  chlorhexidine 2% Cloths 1 Application(s) Topical daily  enoxaparin Injectable 40 milliGRAM(s) SubCutaneous daily  fat emulsion (Fish Oil and Plant Based) 20% Infusion 16.7 mL/Hr IV Continuous <Continuous>  imipenem/cilastatin  IVPB 500 milliGRAM(s) IV Intermittent every 6 hours  insulin regular Infusion 2 Unit(s)/Hr IV Continuous <Continuous>  iron sucrose IVPB 200 milliGRAM(s) IV Intermittent every 24 hours  metoprolol tartrate Injectable 5 milliGRAM(s) IV Push every 6 hours  pantoprazole  Injectable 40 milliGRAM(s) IV Push every 24 hours  Parenteral Nutrition - Adult 1 Each TPN Continuous <Continuous>  Parenteral Nutrition - Adult 1 Each TPN Continuous <Continuous>      PRN INPATIENT MEDICATION  benzocaine 15 mG/menthol 3.6 mG Lozenge 1 Lozenge Oral three times a day PRN  HYDROmorphone  Injectable 0.5 milliGRAM(s) IV Push every 3 hours PRN        VITALS/PHYSICAL EXAM  --------------------------------------------------------------------------------  T(C): 37.7 (07-12-19 @ 11:00), Max: 37.7 (07-12-19 @ 11:00)  HR: 101 (07-12-19 @ 14:00) (93 - 127)  BP: 135/98 (07-12-19 @ 14:00) (85/50 - 188/77)  RR: 39 (07-12-19 @ 14:00) (12 - 39)  SpO2: 100% (07-12-19 @ 14:00) (97% - 100%)  Wt(kg): --        07-11-19 @ 07:01  -  07-12-19 @ 07:00  --------------------------------------------------------  IN: 2608.4 mL / OUT: 2675 mL / NET: -66.6 mL    07-12-19 @ 07:01  -  07-12-19 @ 14:30  --------------------------------------------------------  IN: 550 mL / OUT: 335 mL / NET: 215 mL    Physical Exam:    	Gen: Guarded, but stable, alert, groggy  	HEENT: NC/AT, PERRL, mucosa moist, supple neck, clear oropharynx  	Chest: clear, intubated   	GI: +softly distended, nontender, no BS                  (+)Ostomy pink & viable, effluence noted                  ABthera intact w/ good seal              :  (+)Ching              MSK/Vascular: Passive ROM x4,  no clubbing, cyanosis, nor edema  	Neuro: unable to assess, sedated  	Skin: Warm, good turgor, without rashes          LABS/ CULTURES/ RADIOLOGY:              8.2    23.9  >-----------<  244      [07-12-19 @ 01:38]              24.1     135  |  98  |  58  ----------------------------<  225      [07-12-19 @ 01:38]  4.1   |  26  |  0.82        Ca     8.2     [07-12-19 @ 01:38]      Mg     1.8     [07-12-19 @ 01:38]      Phos  3.6     [07-12-19 @ 01:38]      Triglycerides, Serum: 119 mg/dL (07.08.19 @ 04:24)      Blood Gas Arterial - Calcium, Ionized: 1.18 mmoL/L (07-12-19 @ 02:34)  Blood Gas Calcium, Ionized - Venous: 1.20 mmoL/L (07-12-19 @ 09:21)  Blood Gas Calcium, Ionized - Venous: 1.23 mmoL/L (07-11-19 @ 21:03)  Blood Gas Calcium, Ionized - Venous: 1.24 mmoL/L (07-11-19 @ 19:43)        CAPILLARY BLOOD GLUCOSE    POCT Blood Glucose.: 112 mg/dL (12 Jul 2019 13:36)  POCT Blood Glucose.: 127 mg/dL (12 Jul 2019 12:21)  POCT Blood Glucose.: 153 mg/dL (12 Jul 2019 11:39)  POCT Blood Glucose.: 168 mg/dL (12 Jul 2019 10:51)  POCT Blood Glucose.: 207 mg/dL (12 Jul 2019 09:00)  POCT Blood Glucose.: 234 mg/dL (12 Jul 2019 08:13)  POCT Blood Glucose.: 208 mg/dL (12 Jul 2019 06:52)  POCT Blood Glucose.: 198 mg/dL (12 Jul 2019 06:16)  POCT Blood Glucose.: 201 mg/dL (12 Jul 2019 05:15)  POCT Blood Glucose.: 189 mg/dL (12 Jul 2019 04:13)  POCT Blood Glucose.: 208 mg/dL (12 Jul 2019 03:02)  POCT Blood Glucose.: 232 mg/dL (12 Jul 2019 01:28)  POCT Blood Glucose.: 229 mg/dL (11 Jul 2019 22:12)    Prealbumin, Serum: 5 mg/dL (07-04-19 @ 22:45)        ASSESSMENT/PLAN:  74y Female      1. Protein calorie malnutrition being optimized with TPN:  AA  [ ] gm. CHO [ ] gm. Lipids [ ] gm.  2.  Hyperglycemia managed with: [ ] units of regular insulin    3. Check fluid balance daily.  Strict I/O  [ ] [ ]   4.  Daily BMP, Ionized Calcium, Magnesium and Phosphorous   5.  Triglycerides x 3 days at initiation of TPN then monthly [ ]     TPN pager [phone], spectra [phone]  d/w Dr. Huggins and Dr. DION Cross

## 2019-07-12 NOTE — PROGRESS NOTE ADULT - SUBJECTIVE AND OBJECTIVE BOX
Follow Up:  Perforated Diverticulitis / Peritonitis    Interval History: Afebrile overnight. Went to OR yesterday with no noted purulence on washout. Patient extubated today.     REVIEW OF SYSTEMS  [x  ] ROS unobtainable because:  Bipap mask on and patient slightly lethargic  [  ] All other systems negative except as noted below:	    Constitutional:  [ ] fever [ ] chills  [ ] weight loss  [ ] weakness  Skin:  [ ] rash [ ] phlebitis	  Eyes: [ ] icterus [ ] pain  [ ] discharge	  ENMT: [ ] sore throat  [ ] thrush [ ] ulcers [ ] exudates  Respiratory: [ ] dyspnea [ ] hemoptysis [ ] cough [ ] sputum	  Cardiovascular:  [ ] chest pain [ ] palpitations [ ] edema	  Gastrointestinal:  [ ] nausea [ ] vomiting [ ] diarrhea [ ] constipation [ ] pain	  Genitourinary:  [ ] dysuria [ ] frequency [ ] hematuria [ ] discharge [ ] flank pain  [ ] incontinence  Musculoskeletal:  [ ] myalgias [ ] arthralgias [ ] arthritis  [ ] back pain  Neurological:  [ ] headache [ ] seizures  [ ] confusion/altered mental status  Psychiatric:  [ ] anxiety [ ] depression	  Hematology/Lymphatics:  [ ] lymphadenopathy  Endocrine:  [ ] adrenal [ ] thyroid  Allergic/Immunologic:	 [ ] transplant [ ] seasonal    Allergies  Coreg (Other)  digoxin (Other; Short breath (Mild to Mod))  Lopressor (Short breath)  penicillins (Hives)        ANTIMICROBIALS:  imipenem/cilastatin  IVPB 500 every 6 hours      OTHER MEDS:  MEDICATIONS  (STANDING):  ALBUTerol/ipratropium for Nebulization 3 every 6 hours  buDESOnide    Inhalation Suspension 0.5 every 12 hours  enoxaparin Injectable 40 daily  HYDROmorphone  Injectable 0.5 every 3 hours PRN  insulin lispro (HumaLOG) corrective regimen sliding scale  every 4 hours  metoprolol tartrate Injectable 5 every 6 hours  pantoprazole  Injectable 40 every 24 hours      Vital Signs Last 24 Hrs  T(C): 36.1 (12 Jul 2019 19:00), Max: 37.9 (12 Jul 2019 15:00)  T(F): 97 (12 Jul 2019 19:00), Max: 100.2 (12 Jul 2019 15:00)  HR: 89 (12 Jul 2019 19:00) (89 - 122)  BP: 110/55 (12 Jul 2019 19:00) (85/50 - 160/70)  BP(mean): 79 (12 Jul 2019 19:00) (63 - 111)  RR: 19 (12 Jul 2019 19:00) (16 - 39)  SpO2: 100% (12 Jul 2019 19:00) (97% - 100%)    PHYSICAL EXAMINATION:  General: Alert and Awake, NAD  HEENT: PERRL, EOMI, No subconjunctival hemorrhages, Oropharynx Clear, MMM  Neck: Supple, No SARAHI  Cardiac: RRR, No M/R/G  Resp: CTAB, No Wh/Rh/Ra  Abdomen: Obese, +RLQ ostomy with minimal stool output. +midline wound vac with serosanguinous drainage, +2 drains on left side of abdomen draining serosanguinous material, NBS, ND, tender to palpation diffusely but worst in LLQ and LUQ, No HSM, No rigidity or guarding  MSK: No LE edema. No stigmata of IE. No evidence of phlebitis. No evidence of synovitis.  : + starr  Skin: No rashes or lesions. Skin is warm and dry to the touch.   Neuro: Alert and Awake. CN 2-12 Grossly intact. Moves all four extremities spontaneously.  Psych: Calm, Pleasant, Cooperative                          8.2    23.9  )-----------( 244      ( 12 Jul 2019 01:38 )             24.1       07-12    135  |  98  |  58<H>  ----------------------------<  225<H>  4.1   |  26  |  0.82    Ca    8.2<L>      12 Jul 2019 01:38  Phos  3.6     07-12  Mg     1.8     07-12            MICROBIOLOGY:  v  .Blood  07-10-19   No growth to date.  --  --      .Blood  07-10-19   No growth to date.  --  --      .Body Fluid abdominal fluid  07-09-19   Few Enterococcus faecalis  Few Coag Negative Staphylococcus  --  Enterococcus faecalis      .Body Fluid abdominal fluid  07-09-19   Few Enterococcus faecalis  Few Coag Negative Staphylococcus  --  Enterococcus faecalis      .Surgical Swab None Specimen Source:  abdomen, esw  07-02-19   Moderate Escherichia coli  Moderate Alpha hemolytic strep "Susceptibilities not performed"  Moderate Strep mitis oralis group "Susceptibilities not performed"  Growth in fluid media only Enterococcus faecalis  --  Escherichia coli  Enterococcus faecalis      .Blood  07-01-19   No growth at 5 days.  --  --      .Urine  07-01-19   No growth  --  --    RADIOLOGY:    EXAM:  XR CHEST PORTABLE ROUTINE 1V                        PROCEDURE DATE:  07/12/2019    Small right effusion and right atelectasis.

## 2019-07-12 NOTE — PROCEDURE NOTE - ADDITIONAL PROCEDURE DETAILS
-Indication for interrogation: Tachycardia  -Presenting rhythm: A sense/ Bi V pacing, rate 110-120's (up to 130's at times), PVCs    -Measured data WNL, NL BiV ICD function, Pt is not pacemaker dependent  -Total AT/AF burden: <0.1%, BiV pacing 94.8%  -OptiVol fluid index above thresholds since 7/4/19 and is ongoing, this is indicative of fluid accumulation   -Since lasting interrogation on 4/15/19, stored data revealed 11 ventricular high rate episodes between 4/30/19-7/10/19, review of available EGMs c/w NSVT lasting 5 to 13 beats.   -Patient's underlying rhythm is sinus tach with rates between 110-120's (up to 130's at times).   -Upon interrogation patient is biventricular pacing all the time but somehow on telemetry the ventricular pacing spike only appears intermittently (not sure why)   -Changes made: none as BiV ICD with normal device function   -Findings d/w SICU staff    [id number]

## 2019-07-12 NOTE — PROGRESS NOTE ADULT - ASSESSMENT
74 year old female PMHx of HFrEF s/p AICD, asthma, HTN, HLD, atrial fibrillation on eliquis who presented to [facility] on 6/30 with diverticulitis at splenic flexure with perforation on repeat scan on 7/1.  On 7/2 patient underwent Exploratory laparotomy, partial colectomy, end colostomy, and placement of temporary abdominal VAC dressing. Intraoperatively was noted of having perforation of distal transverse colon with feculent peritonitis. On 7/3 patient underwent Reopening of abdominal wound and an abdominal washout. On 7/9 patient RTOR for Abthera Vac placement and was found to have viable Edematous bowel and minimal pus-like fluid in pelvis which was sent for culture. On 7/10 hypotensive to SBP in the 80's and broadened to Imipenem. Wound cultures from 7/2/19 with E. coli, AHS, Strep mitis and Enterococcus faecalis (amp susceptible).    Ciprofloxacin 6/30 - 7/1  Aztreonam 7/2 - 7/4  Flagyl 6/30 - 7/9  Ceftriaxone 7/4 - 7/9  Imipenem 7/10 -->>    Overall, feculent peritonitis and resolved septic shock secondary to perforated diverticulitis. 7/9 surgical cultures with enterococcus. Imipenem offers coverage for ampicillin susceptible enterococcus   CoNS noted on 7/9 surgical cultures - suspect it is possible contaminant - would not  at this point  7/11 RTOR with no evidence of purulence/infection noted  Would continue current management    --Continue imipenem 500 mg IV Q6H  --F/U on 7/10 blood cultures     I will be away over the weekend please contact the ID Office for questions regarding this patient.     Rex Goode M.D.  [facility] Division of Infectious Disease  8AM-5PM: Pager Number [phone]  After Hours: Please contact the Infectious Diseases Office at [phone]

## 2019-07-12 NOTE — PROCEDURE NOTE - NSPROCNAME_GEN_A_CORE
Arterial Puncture/Cannulation
Arterial Puncture/Cannulation
CRT-D (Cardiac Resynchronization Therapy with Defibrillation Capabilities) Interrogation Note
Central Line Insertion

## 2019-07-12 NOTE — PROGRESS NOTE ADULT - SUBJECTIVE AND OBJECTIVE BOX
Interval Events:  s/p abdominal washout, abdomen closed with Strattice, 2 garcia drains placed (upper abdomen and pelvis), black vac placed over mesh     S: Patient intubated and sedated.    O: Vital Signs  T(C): 36.7 (07-12 @ 03:00), Max: 36.9 (07-11 @ 23:00)  HR: 114 (07-12 @ 05:36) (86 - 127)  BP: 113/66 (07-12 @ 05:00) (93/54 - 188/77)  RR: 28 (07-12 @ 05:00) (12 - 51)  SpO2: 100% (07-12 @ 05:36) (97% - 100%)  07-10-19 @ 07:01  -  07-11-19 @ 07:00  --------------------------------------------------------  IN: 3198.3 mL / OUT: 4150 mL / NET: -951.7 mL    07-11-19 @ 07:01  -  07-12-19 @ 05:55  --------------------------------------------------------  IN: 2500.7 mL / OUT: 2575 mL / NET: -74.3 mL      General: NAD, following commands  Resp: ETT secured in place  CVS: regular rate and rhythm  Abdomen: soft, nontender, nondistended, ostomy pink and viable with no stool/gas in bag, wound vac in place with good suction  Extremities: +edema in UE/LE b/l  Skin: warm, dry, appropriate color                          8.2    23.9  )-----------( 244      ( 12 Jul 2019 01:38 )             24.1   07-12    135  |  98  |  58<H>  ----------------------------<  225<H>  4.1   |  26  |  0.82    Ca    8.2<L>      12 Jul 2019 01:38  Phos  3.6     07-12  Mg     1.8     07-12

## 2019-07-12 NOTE — PROGRESS NOTE ADULT - ATTENDING COMMENTS
extuabated to BiPAP.  returned to OR for closure yesterday.   meds: lopressor 5 IV Q, Lasix 40 IV, Diamox, Insulin, TPN, antibiotics.   I/O: -67, -950,   afebrile, 92-127SR BIVPaced, 85/-100/  07-12    135  |  98  |  58<H>  ----------------------------<  225<H>  4.1   |  26  |  0.82    Ca    8.2<L>      12 Jul 2019 01:38  Phos  3.6     07-12  Mg     1.8     07-12                          8.2    23.9  )-----------( 244      ( 12 Jul 2019 01:38 )             24.1   continue current supportive regimen.   would avoid BB. Replete Mg. Initiate HDZN/Nitrates when tolerates.  Heidy Wayne

## 2019-07-12 NOTE — AIRWAY REMOVAL NOTE  ADULT & PEDS - O2 CONCENTRATION (%)
Date & Time: 12/1/2019, 1:15 PM  Patient: Bobby Wiseman  Encounter Provider(s):    Max Talavera MD       To Whom It May Concern:    Bobby Wiseman was seen and treated in our department on 12/1/2019. She should not return to work until 12/4/2019. 40

## 2019-07-12 NOTE — PROGRESS NOTE ADULT - ASSESSMENT
75 yo F with PMHx of HFrEF (EF 35% now improved to 45%) s/p CRT-D, asthma (not on BB d/t severity), HTN, HLD, atrial fibrillation on eliquis, who p/w severe, sudden onset L sided abd pain, found to have proximal descending colon/splenic flexure acute diverticulitis with evidence of contained perforation, now s/p exploratory laparotomy, left hemicolectomy, end colostomy, and ABThera VAC placement on 7/1. Transferred back to the SICU immediately postop requiring high doses of pressors, now off and being treated with broad spectrum abx. S/P abdominal washout x3 (last 7/9). Returned to OR 7/11 for attempted abdominal closure however abdomen unable to be closed due to bowel edema. Vac placed. She was extubated 7/12 AM to BiPAP. Weight significantly declined over the past 24 hours, however noted to be nearly net even. 2.3L UOP follow dose of diamox and lasix 40mg IV. 75 yo F with PMHx of HFrEF (EF 35% now improved to 45%) s/p CRT-D, asthma (not on BB d/t severity), HTN, HLD, atrial fibrillation on eliquis, who p/w severe, sudden onset L sided abd pain, found to have proximal descending colon/splenic flexure acute diverticulitis with evidence of contained perforation, now s/p exploratory laparotomy, left hemicolectomy, end colostomy, and ABThera VAC placement on 7/1. Transferred back to the SICU immediately postop requiring high doses of pressors, now off and being treated with broad spectrum abx. S/P abdominal washout x3 (last 7/9). Returned to OR 7/11 for abdominal closure. Vac placed. She was extubated 7/12 AM to BiPAP. Weight significantly declined over the past 24 hours, however noted to be nearly net even. 2.3L UOP follow dose of diamox and lasix 40mg IV.

## 2019-07-12 NOTE — PROGRESS NOTE ADULT - ASSESSMENT
A/P: 74F with perforated diverticulitis s/p exploratory laparotomy, left hemicolectomy, end colostomy, and ABThera Vac placement, s/p 3x RTOR for abdominal washout and abthera vac placement, s/p abdominal washout, abdomen closed with Strattice, 2 garcia drains placed (upper abdomen and pelvis), black vac placed over mesh.      - Skin graft over mesh once granulated in 2-4 weeks, will discuss with Plastics  - F/U intraabdominal fluid cultures  - Appreciate excellent SICU care    Green [phone].

## 2019-07-12 NOTE — PROGRESS NOTE ADULT - ATTENDING COMMENTS
RTOR, continues insulin drip.  Seen on 7/12 am rounds  1.  Protein calorie malnutrition, severe--Albumin <2, prealbumin 5.  Agree with goal for calories, protein  2.  Hyperglycemia--augmented TPN insulin but given high stress, insulin resistant state, would continue infusion

## 2019-07-12 NOTE — PROGRESS NOTE ADULT - ASSESSMENT
A/P:74F admitted with abdominal pain and exam findings consistent with peritonitis secondary to acute diverticulitis at the splenic flexure with evidence of contained perforation now POD3 s/p Exploratory laparotomy, left hemicolectomy, end colostomy, and ABThera Vac placement and POD2 from RTOR for abdominal washout and Abthera vac placement, off pressors. TPN started 7/5 for Protein-Calorie Malnutrition    - TPN formula @ goal: Carbohydrates: 210grams, Amino Acid: 120grams, 40g lipids in 2400 ml, Total volume decreased to 2.0L from 2400 ml  - Pt off of Ceftriaxone -on Imipenem    - Strict Intake and Output.,   - Weights three times a week  - Monitor BMP, Mg, Ionized Ca, Phosphorus daily  - Check Triglycerides daily for first 3 days of TPN, then monthly   - Pre-albumin weekly.  - Hyperglycemia- Pt requiring Insulin gtt --Increased Insulin in TPN from 60U to 70U insulin, pt may need Insulin gtt in the periop period. Difficult to predict stress needs without causing hypoglycemia      Fingersticks to monitor glucose every 6 hours until stable then may be decreased to twice a day, coverage with ISS - to be ordered by primary team  - HyperNa-stabilized;  No NaAce or NaCl in TPN, only NaPhos 30mmol, increased TV to 2400ml originally, however SICU requesting volume be decreased since pt is being diuresed.       TV decreased to 2.0L and SICU advised to use Metolazone, loop diuretic to improve the hypernatremia, and decreased dose of lasix as titrating fluid needs.  - Hyperhosphatemia- NaPhos decreased from 30 to 15mmol  - HypoK increased to 100mEq  - Continue as per SICU/ Surgery, will follow with you d/w team  - Please do not hold TPN if/when pt goes to OR.  If TPN stopped, please hang D10.      TPN team, pager [phone], spectra [phone]  D/w Adela Cross

## 2019-07-12 NOTE — PROGRESS NOTE ADULT - PROBLEM SELECTOR PLAN 1
- Improving, now off pressors  - WBC downtrending  - Continue abx, ID following  - BCx 7/10 NGTD  - Lactate 1

## 2019-07-12 NOTE — PROGRESS NOTE ADULT - SUBJECTIVE AND OBJECTIVE BOX
Subjective:  -  returned to OR for attempted abdominal closure. Unable to close abd 2/2 bowel edema, vac placed, NGT in place  - Extubated this AM to BiPAP  - NSVT on tele/ICD interrogation. Lopressor increased from 2.5 IV Q6 to 5mg IV Q6  - Pt lethargic unable to provide ROS    Medications:  ALBUTerol/ipratropium for Nebulization 3 milliLiter(s) Nebulizer every 6 hours  benzocaine 15 mG/menthol 3.6 mG Lozenge 1 Lozenge Oral three times a day PRN  buDESOnide    Inhalation Suspension 0.5 milliGRAM(s) Inhalation every 12 hours  chlorhexidine 0.12% Liquid 15 milliLiter(s) Oral Mucosa <User Schedule>  chlorhexidine 2% Cloths 1 Application(s) Topical daily  enoxaparin Injectable 40 milliGRAM(s) SubCutaneous daily  fat emulsion (Fish Oil and Plant Based) 20% Infusion 16.7 mL/Hr IV Continuous <Continuous>  HYDROmorphone  Injectable 0.5 milliGRAM(s) IV Push every 3 hours PRN  imipenem/cilastatin  IVPB 500 milliGRAM(s) IV Intermittent every 6 hours  insulin regular Infusion 2 Unit(s)/Hr IV Continuous <Continuous>  iron sucrose IVPB 200 milliGRAM(s) IV Intermittent every 24 hours  metoprolol tartrate Injectable 5 milliGRAM(s) IV Push every 6 hours  pantoprazole  Injectable 40 milliGRAM(s) IV Push every 24 hours  Parenteral Nutrition - Adult 1 Each TPN Continuous <Continuous>  Parenteral Nutrition - Adult 1 Each TPN Continuous <Continuous>      Physical Exam:    Vitals:  Vital Signs Last 24 Hours  T(C): 37.7 (19 @ 11:00), Max: 37.7 (19 @ 11:00)  HR: 107 (19 @ 12:00) (93 - 127)  BP: 103/57 (19 @ 12:00) (85/50 - 188/77)  RR: 24 (19 @ 12:00) (12 - 34)  SpO2: 100% (19 @ 12:00) (97% - 100%)    BiPAP 40% 14 10/15    Weight in k.2 ( @ 05:11)    I&O's Summary    2019 07:01  -  2019 07:00  --------------------------------------------------------  IN: 2608.4 mL / OUT: 2675 mL / NET: -66.6 mL    2019 07:  -  2019 13:19  --------------------------------------------------------  IN: 550 mL / OUT: 335 mL / NET: 215 mL    Tele: ST  100-120s    General: No distress. Comfortable.  HEENT: NGT in place  Neck: Neck supple. JVP mildly elevated. No masses  Chest: Diminished bilateral bases  CV: Tachycardic, Normal S1 and S2. No murmurs, rub, or gallops. Radial pulses normal. +2 bilateral upper extremity edema, +1 dependent BLE edema  Abdomen: RLQ ostomy beefy red with scant output. Midline abdominal vac in place.   Neurology: Somnolent, responding to physical stimulation and repeated verbal stimulation. KAJAL  Psych: TAMANNA    Labs:                        8.2    23.9  )-----------( 244      ( 2019 01:38 )             24.1     0712    135  |  98  |  58<H>  ----------------------------<  225<H>  4.1   |  26  |  0.82    Ca    8.2<L>      2019 01:38  Phos  3.6     12  Mg     1.8      Subjective:  -  returned to OR for abdominal closure, closed with mesh with vac placement.  - Extubated this AM to BiPAP  - NSVT on tele/ICD interrogation. Lopressor increased from 2.5 IV Q6 to 5mg IV Q6  - Pt lethargic unable to provide ROS    Medications:  ALBUTerol/ipratropium for Nebulization 3 milliLiter(s) Nebulizer every 6 hours  benzocaine 15 mG/menthol 3.6 mG Lozenge 1 Lozenge Oral three times a day PRN  buDESOnide    Inhalation Suspension 0.5 milliGRAM(s) Inhalation every 12 hours  chlorhexidine 0.12% Liquid 15 milliLiter(s) Oral Mucosa <User Schedule>  chlorhexidine 2% Cloths 1 Application(s) Topical daily  enoxaparin Injectable 40 milliGRAM(s) SubCutaneous daily  fat emulsion (Fish Oil and Plant Based) 20% Infusion 16.7 mL/Hr IV Continuous <Continuous>  HYDROmorphone  Injectable 0.5 milliGRAM(s) IV Push every 3 hours PRN  imipenem/cilastatin  IVPB 500 milliGRAM(s) IV Intermittent every 6 hours  insulin regular Infusion 2 Unit(s)/Hr IV Continuous <Continuous>  iron sucrose IVPB 200 milliGRAM(s) IV Intermittent every 24 hours  metoprolol tartrate Injectable 5 milliGRAM(s) IV Push every 6 hours  pantoprazole  Injectable 40 milliGRAM(s) IV Push every 24 hours  Parenteral Nutrition - Adult 1 Each TPN Continuous <Continuous>  Parenteral Nutrition - Adult 1 Each TPN Continuous <Continuous>      Physical Exam:    Vitals:  Vital Signs Last 24 Hours  T(C): 37.7 (19 @ 11:00), Max: 37.7 (19 @ 11:00)  HR: 107 (19 @ 12:00) (93 - 127)  BP: 103/57 (19 @ 12:00) (85/50 - 188/77)  RR: 24 (19 @ 12:00) (12 - 34)  SpO2: 100% (19 @ 12:00) (97% - 100%)    BiPAP 40% 14 10/15    Weight in k.2 ( @ 05:11)    I&O's Summary    2019 07:01  -  2019 07:00  --------------------------------------------------------  IN: 2608.4 mL / OUT: 2675 mL / NET: -66.6 mL    2019 07:01  -  2019 13:19  --------------------------------------------------------  IN: 550 mL / OUT: 335 mL / NET: 215 mL    Tele: ST  100-120s    General: No distress. Comfortable.  HEENT: NGT in place  Neck: Neck supple. JVP mildly elevated. No masses  Chest: Diminished bilateral bases  CV: Tachycardic, Normal S1 and S2. No murmurs, rub, or gallops. Radial pulses normal. +2 bilateral upper extremity edema, +1 dependent BLE edema  Abdomen: RLQ ostomy beefy red with scant output. Midline abdominal vac in place.   Neurology: Somnolent, responding to physical stimulation and repeated verbal stimulation. RDZ  Psych: TAMANNA    Labs:                        8.2    23.9  )-----------( 244      ( 2019 01:38 )             24.1         135  |  98  |  58<H>  ----------------------------<  225<H>  4.1   |  26  |  0.82    Ca    8.2<L>      2019 01:38  Phos  3.6       Mg     1.8

## 2019-07-12 NOTE — AIRWAY REMOVAL NOTE  ADULT & PEDS - ARTIFICAL AIRWAY REMOVAL COMMENTS
[place] order for extubation verified.  The patient was identified by full name and birth day compared to the identification band. Present during the procedure was Jacey Glasgow RN. Pt successfully extubated and was placed on bipap. Pt  is stable and comfortable post extubation.
Written order for extubation verified. The patient was identified by full name and birth date compared to the identification band.  Present during the procedure was Kati GUSTAFSON
patient extubated with JANAY Gallegos in attendance after verifying order and id band verification 40 aerosol mask tolerating well o2 sat

## 2019-07-12 NOTE — PROGRESS NOTE ADULT - ATTENDING COMMENTS
I have seen and evaluated the patient and discussed the relevant clinical findings and plan with the surgical housestaff and fellow.  Agree with the above documentation with addenda as noted.     Stable overnight, sedation off this morning to attempt to extubate  Appears comfortable  Abdomen soft, vac in place with good suction  Drains serosanguinous  Ostomy appears healthy, no function yet.    Wean vent as tolerated  Cont NPO, TPN; awaiting resolution of ileus  Cont abx -- no purulence in abdomen in OR yesterday; f/u prior cultures  Bedside vac change Sunday  Will call cardiology as patient has been having intermittent arrhythmias (hemodynamically stable)  Will consult plastics to evaluate for eventual skin grafting    Discussed plan with surgical and ICU teams; discussed at length with daughter at bedside      Clark Alvarado MD

## 2019-07-12 NOTE — CHART NOTE - NSCHARTNOTEFT_GEN_A_CORE
Primary Surgeon	Christiano  Diagnosis: Perforated diverticulum of large intestine  PROCEDURES: Open repair of incisional hernia using biological mesh; Third look laparotomy; 2019    SUBJECTIVE: Pt seen in SICU after procedure. She was sedated, but arousable.  Though through limited communication, the patient did not appear to express an issues with pain, as expected.  Rest of subjective exam was limited.      OBJECTIVE:  PHYSICAL EXAM:  Gen: sedated but mildly arousable, appeared comfortable lying in bed  RESP: Mechanical ventilation   ABDOMEN: Soft, wound vac and drains in place, c/d/i      ** VITAL SIGNS / I&O's **    Vital Signs Last 24 Hrs  T(C): 36.9 (2019 23:00), Max: 36.9 (2019 23:00)  T(F): 98.4 (2019 23:00), Max: 98.4 (2019 23:00)  HR: 121 (2019 04:00) (86 - 127)  BP: 106/55 (2019 04:00) (93/54 - 188/77)  BP(mean): 75 (2019 04:00) (65 - 112)  RR: 28 (2019 04:00) (12 - 51)  SpO2: 100% (2019 04:00) (97% - 100%)  Mode: AC/ CMV (Assist Control/ Continuous Mandatory Ventilation)  RR (machine): 16  TV (machine): 450  FiO2: 40  PEEP: 5  ITime: 0.9  MAP: 7  PIP: 18      10 Jul 2019 07:01  -  2019 07:00  --------------------------------------------------------  IN:    fat emulsion (Fish Oil and Plant Based) 20% Infusion: 233.8 mL    insulin regular Infusion: 69.5 mL    insulin regular Infusion: 3 mL    Solution: 100 mL    Solution: 200 mL    Solution: 200 mL    Solution: 400 mL    TPN (Total Parenteral Nutrition): 1992 mL  Total IN: 3198.3 mL    OUT:    Nasoenteral Tube: 450 mL    Ureteral Catheter: 3300 mL    VAC (Vacuum Assisted Closure) System: 400 mL  Total OUT: 4150 mL    Total NET: -951.7 mL      2019 07:01  -  2019 04:28  --------------------------------------------------------  IN:    dexmedetomidine Infusion: 20.8 mL    fat emulsion (Fish Oil and Plant Based) 20% Infusion: 167 mL    insulin regular Infusion: 7 mL    insulin regular Infusion: 6 mL    Solution: 100 mL    Solution: 100 mL    TPN (Total Parenteral Nutrition): 1743 mL  Total IN: 2143.8 mL    OUT:    Nasoenteral Tube: 200 mL    Ureteral Catheter: 2145 mL  Total OUT: 2345 mL    Total NET: -201.2 mL            ** LABS **                          8.2    23.9  )-----------( 244      ( 2019 01:38 )             24.1     2019 01:38    135    |  98     |  58     ----------------------------<  225    4.1     |  26     |  0.82     Ca    8.2        2019 01:38  Phos  3.6       2019 01:38  Mg     1.8       2019 01:38        CAPILLARY BLOOD GLUCOSE      POCT Blood Glucose.: 189 mg/dL (2019 04:13)  POCT Blood Glucose.: 208 mg/dL (2019 03:02)  POCT Blood Glucose.: 232 mg/dL (2019 01:28)  POCT Blood Glucose.: 229 mg/dL (2019 22:12)  POCT Blood Glucose.: 154 mg/dL (2019 13:27)  POCT Blood Glucose.: 113 mg/dL (2019 11:14)  POCT Blood Glucose.: 94 mg/dL (2019 10:53)  POCT Blood Glucose.: 104 mg/dL (2019 09:55)  POCT Blood Glucose.: 110 mg/dL (2019 09:02)  POCT Blood Glucose.: 101 mg/dL (2019 07:57)  POCT Blood Glucose.: 130 mg/dL (2019 06:55)  POCT Blood Glucose.: 135 mg/dL (2019 06:07)  POCT Blood Glucose.: 150 mg/dL (2019 05:03)            Culture - Blood (collected 10 Jul 2019 20:09)  Source: .Blood  Preliminary Report (2019 21:01):    No growth to date.    Culture - Blood (collected 10 Jul 2019 20:08)  Source: .Blood  Preliminary Report (2019 21:01):    No growth to date.    Culture - Body Fluid with Gram Stain (collected 2019 17:54)  Source: .Body Fluid abdominal fluid  Gram Stain (2019 20:48):    polymorphonuclear leukocytes seen    No organisms seen    by cytocentrifuge  Preliminary Report (2019 19:29):    Few Enterococcus faecalis    Few Coag Negative Staphylococcus  Organism: Enterococcus faecalis (2019 19:22)  Organism: Enterococcus faecalis (2019 19:22)    Culture - Body Fluid with Gram Stain (collected 2019 17:46)  Source: .Body Fluid abdominal fluid  Gram Stain (2019 20:48):    polymorphonuclear leukocytes seen    No organisms seen    by cytocentrifuge  Preliminary Report (2019 19:20):    Few Enterococcus faecalis    Few Coag Negative Staphylococcus  Organism: Enterococcus faecalis (2019 19:20)  Organism: Enterococcus faecalis (2019 19:20)      Urinalysis Basic - ( 10 Jul 2019 08:47 )    Color: Yellow / Appearance: Clear / S.024 / pH: x  Gluc: x / Ketone: Trace  / Bili: Negative / Urobili: Negative   Blood: x / Protein: Trace / Nitrite: Negative   Leuk Esterase: Small / RBC: 8 /hpf / WBC 6 /HPF   Sq Epi: x / Non Sq Epi: 2 /hpf / Bacteria: Negative        MEDICATIONS  (STANDING):  ALBUTerol/ipratropium for Nebulization 3 milliLiter(s) Nebulizer every 6 hours  buDESOnide    Inhalation Suspension 0.5 milliGRAM(s) Inhalation every 12 hours  chlorhexidine 0.12% Liquid 15 milliLiter(s) Oral Mucosa <User Schedule>  chlorhexidine 2% Cloths 1 Application(s) Topical daily  dexmedetomidine Infusion 0.2 MICROgram(s)/kG/Hr (4.16 mL/Hr) IV Continuous <Continuous>  enoxaparin Injectable 40 milliGRAM(s) SubCutaneous daily  fat emulsion (Fish Oil and Plant Based) 20% Infusion 16.7 mL/Hr (16.7 mL/Hr) IV Continuous <Continuous>  imipenem/cilastatin  IVPB 500 milliGRAM(s) IV Intermittent every 6 hours  insulin regular Infusion 2 Unit(s)/Hr (2 mL/Hr) IV Continuous <Continuous>  iron sucrose IVPB 200 milliGRAM(s) IV Intermittent every 24 hours  magnesium sulfate  IVPB 2 Gram(s) IV Intermittent once  metoprolol tartrate Injectable 5 milliGRAM(s) IV Push every 6 hours  pantoprazole  Injectable 40 milliGRAM(s) IV Push every 24 hours  Parenteral Nutrition - Adult 1 Each (83 mL/Hr) TPN Continuous <Continuous>    MEDICATIONS  (PRN):  benzocaine 15 mG/menthol 3.6 mG Lozenge 1 Lozenge Oral three times a day PRN Sore Throat  HYDROmorphone  Injectable 0.5 milliGRAM(s) IV Push every 3 hours PRN pain      Plan:  - Continue recs per SICU

## 2019-07-13 LAB
ANION GAP SERPL CALC-SCNC: 10 MMOL/L — SIGNIFICANT CHANGE UP (ref 5–17)
BUN SERPL-MCNC: 56 MG/DL — HIGH (ref 7–23)
CALCIUM SERPL-MCNC: 8.5 MG/DL — SIGNIFICANT CHANGE UP (ref 8.4–10.5)
CHLORIDE SERPL-SCNC: 102 MMOL/L — SIGNIFICANT CHANGE UP (ref 96–108)
CO2 SERPL-SCNC: 25 MMOL/L — SIGNIFICANT CHANGE UP (ref 22–31)
CREAT SERPL-MCNC: 0.78 MG/DL — SIGNIFICANT CHANGE UP (ref 0.5–1.3)
GAS PNL BLDV: SIGNIFICANT CHANGE UP
GLUCOSE BLDC GLUCOMTR-MCNC: 139 MG/DL — HIGH (ref 70–99)
GLUCOSE BLDC GLUCOMTR-MCNC: 164 MG/DL — HIGH (ref 70–99)
GLUCOSE BLDC GLUCOMTR-MCNC: 165 MG/DL — HIGH (ref 70–99)
GLUCOSE BLDC GLUCOMTR-MCNC: 182 MG/DL — HIGH (ref 70–99)
GLUCOSE BLDC GLUCOMTR-MCNC: 183 MG/DL — HIGH (ref 70–99)
GLUCOSE SERPL-MCNC: 182 MG/DL — HIGH (ref 70–99)
HCT VFR BLD CALC: 21.7 % — LOW (ref 34.5–45)
HGB BLD-MCNC: 7.1 G/DL — LOW (ref 11.5–15.5)
MAGNESIUM SERPL-MCNC: 2.1 MG/DL — SIGNIFICANT CHANGE UP (ref 1.6–2.6)
MCHC RBC-ENTMCNC: 31.5 PG — SIGNIFICANT CHANGE UP (ref 27–34)
MCHC RBC-ENTMCNC: 32.5 GM/DL — SIGNIFICANT CHANGE UP (ref 32–36)
MCV RBC AUTO: 96.8 FL — SIGNIFICANT CHANGE UP (ref 80–100)
PHOSPHATE SERPL-MCNC: 3.6 MG/DL — SIGNIFICANT CHANGE UP (ref 2.5–4.5)
PLATELET # BLD AUTO: 242 K/UL — SIGNIFICANT CHANGE UP (ref 150–400)
POTASSIUM SERPL-MCNC: 3.8 MMOL/L — SIGNIFICANT CHANGE UP (ref 3.5–5.3)
POTASSIUM SERPL-SCNC: 3.8 MMOL/L — SIGNIFICANT CHANGE UP (ref 3.5–5.3)
RBC # BLD: 2.25 M/UL — LOW (ref 3.8–5.2)
RBC # FLD: 17.9 % — HIGH (ref 10.3–14.5)
SODIUM SERPL-SCNC: 137 MMOL/L — SIGNIFICANT CHANGE UP (ref 135–145)
WBC # BLD: 20.3 K/UL — HIGH (ref 3.8–10.5)
WBC # FLD AUTO: 20.3 K/UL — HIGH (ref 3.8–10.5)

## 2019-07-13 PROCEDURE — 99232 SBSQ HOSP IP/OBS MODERATE 35: CPT

## 2019-07-13 PROCEDURE — 71045 X-RAY EXAM CHEST 1 VIEW: CPT | Mod: 26

## 2019-07-13 RX ORDER — I.V. FAT EMULSION 20 G/100ML
16.7 EMULSION INTRAVENOUS
Qty: 40 | Refills: 0 | Status: DISCONTINUED | OUTPATIENT
Start: 2019-07-13 | End: 2019-07-14

## 2019-07-13 RX ORDER — ACETAMINOPHEN 500 MG
1000 TABLET ORAL ONCE
Refills: 0 | Status: COMPLETED | OUTPATIENT
Start: 2019-07-14 | End: 2019-07-14

## 2019-07-13 RX ORDER — IRON SUCROSE 20 MG/ML
200 INJECTION, SOLUTION INTRAVENOUS EVERY 24 HOURS
Refills: 0 | Status: COMPLETED | OUTPATIENT
Start: 2019-07-13 | End: 2019-07-17

## 2019-07-13 RX ORDER — ACETAMINOPHEN 500 MG
1000 TABLET ORAL ONCE
Refills: 0 | Status: COMPLETED | OUTPATIENT
Start: 2019-07-13 | End: 2019-07-13

## 2019-07-13 RX ORDER — POTASSIUM CHLORIDE 20 MEQ
10 PACKET (EA) ORAL ONCE
Refills: 0 | Status: COMPLETED | OUTPATIENT
Start: 2019-07-13 | End: 2019-07-13

## 2019-07-13 RX ORDER — ELECTROLYTE SOLUTION,INJ
1 VIAL (ML) INTRAVENOUS
Refills: 0 | Status: DISCONTINUED | OUTPATIENT
Start: 2019-07-13 | End: 2019-07-13

## 2019-07-13 RX ADMIN — IMIPENEM AND CILASTATIN 100 MILLIGRAM(S): 250; 250 INJECTION, POWDER, FOR SOLUTION INTRAVENOUS at 23:15

## 2019-07-13 RX ADMIN — Medication 1000 MILLIGRAM(S): at 23:15

## 2019-07-13 RX ADMIN — Medication 2: at 21:37

## 2019-07-13 RX ADMIN — Medication 3 MILLILITER(S): at 05:15

## 2019-07-13 RX ADMIN — Medication 5 MILLIGRAM(S): at 17:26

## 2019-07-13 RX ADMIN — Medication 5 MILLIGRAM(S): at 12:15

## 2019-07-13 RX ADMIN — Medication 1000 MILLIGRAM(S): at 14:45

## 2019-07-13 RX ADMIN — Medication 1000 MILLIGRAM(S): at 02:37

## 2019-07-13 RX ADMIN — Medication 400 MILLIGRAM(S): at 23:03

## 2019-07-13 RX ADMIN — Medication 2: at 05:02

## 2019-07-13 RX ADMIN — Medication 2: at 03:00

## 2019-07-13 RX ADMIN — Medication 3 MILLILITER(S): at 13:07

## 2019-07-13 RX ADMIN — Medication 5 MILLIGRAM(S): at 23:03

## 2019-07-13 RX ADMIN — PANTOPRAZOLE SODIUM 40 MILLIGRAM(S): 20 TABLET, DELAYED RELEASE ORAL at 05:02

## 2019-07-13 RX ADMIN — HYDROMORPHONE HYDROCHLORIDE 0.5 MILLIGRAM(S): 2 INJECTION INTRAMUSCULAR; INTRAVENOUS; SUBCUTANEOUS at 11:25

## 2019-07-13 RX ADMIN — Medication 2: at 11:51

## 2019-07-13 RX ADMIN — Medication 1 EACH: at 17:46

## 2019-07-13 RX ADMIN — IRON SUCROSE 110 MILLIGRAM(S): 20 INJECTION, SOLUTION INTRAVENOUS at 21:00

## 2019-07-13 RX ADMIN — ENOXAPARIN SODIUM 40 MILLIGRAM(S): 100 INJECTION SUBCUTANEOUS at 12:15

## 2019-07-13 RX ADMIN — I.V. FAT EMULSION 16.7 ML/HR: 20 EMULSION INTRAVENOUS at 17:46

## 2019-07-13 RX ADMIN — IMIPENEM AND CILASTATIN 100 MILLIGRAM(S): 250; 250 INJECTION, POWDER, FOR SOLUTION INTRAVENOUS at 05:03

## 2019-07-13 RX ADMIN — Medication 2: at 17:25

## 2019-07-13 RX ADMIN — Medication 0.5 MILLIGRAM(S): at 18:01

## 2019-07-13 RX ADMIN — Medication 3 MILLILITER(S): at 18:01

## 2019-07-13 RX ADMIN — Medication 0.5 MILLIGRAM(S): at 05:15

## 2019-07-13 RX ADMIN — Medication 400 MILLIGRAM(S): at 02:05

## 2019-07-13 RX ADMIN — Medication 400 MILLIGRAM(S): at 14:30

## 2019-07-13 RX ADMIN — Medication 5 MILLIGRAM(S): at 05:02

## 2019-07-13 RX ADMIN — IMIPENEM AND CILASTATIN 100 MILLIGRAM(S): 250; 250 INJECTION, POWDER, FOR SOLUTION INTRAVENOUS at 17:26

## 2019-07-13 RX ADMIN — Medication 100 MILLIEQUIVALENT(S): at 03:57

## 2019-07-13 RX ADMIN — IMIPENEM AND CILASTATIN 100 MILLIGRAM(S): 250; 250 INJECTION, POWDER, FOR SOLUTION INTRAVENOUS at 12:16

## 2019-07-13 RX ADMIN — HYDROMORPHONE HYDROCHLORIDE 0.5 MILLIGRAM(S): 2 INJECTION INTRAMUSCULAR; INTRAVENOUS; SUBCUTANEOUS at 11:40

## 2019-07-13 NOTE — PHYSICAL THERAPY INITIAL EVALUATION ADULT - ADDITIONAL COMMENTS
Lives on the first floor level of a private two family home, +6 steps to enter. Pt's daughter resides on the second floor with her . Patient mostly independent with cane or rollator prior. No services present in home pta, patient's daughter provides assistance as needed. Patient with recent admission to [facility] from 5/31 to 6/3, when she was discharged home with no skilled needs.
Pt lives with her daughter Yeimy (currently out of work & able to provide assist upon D/C home), 6-7 entry steps (+ rail). Pt stays on main level of home. Prior to admission pt was independent with all functional mobility & ADL's. Pt is a household ambulation (short distances only due to PMH of asthma, no home O2). Pt swtiches between use of straight cane, rollator, and/or wheelchair depending on how she feels. Pt is right hand dominant & wears eye glasses. Pt also has shower chair at home. Goal of therapy: return home; pt family are not 100% agreeable to subacute rehab at this time as they have had bad experiences in the past. Need for extensive DMEs and assist discussed if pt/family to decide to return home.

## 2019-07-13 NOTE — PROGRESS NOTE ADULT - SUBJECTIVE AND OBJECTIVE BOX
[facility] NUTRITION SUPPORT / TPN -- FOLLOW UP NOTE  --------------------------------------------------------------------------------    24 hour events/subjective:  - Still no ostomy function, ostomy digitalized with no stool.  - Pacemaker interrogated for tachycardia and runs of PVCs; underlying rhythm sinus tachycardia with PVCs.  -insulin drip stopped yesterday    Diet:  Diet, NPO:   Except Medications (07-09-19 @ 14:09)      PAST HISTORY  --------------------------------------------------------------------------------  No significant changes to PMH, PSH, FHx, SHx, unless otherwise noted    ALLERGIES & MEDICATIONS  --------------------------------------------------------------------------------  Allergies    Coreg (Other)  digoxin (Other; Short breath (Mild to Mod))  Lopressor (Short breath)  penicillins (Hives)    Intolerances    metoprolol (Other)  Solu-Medrol (Other (Mild to Mod))    Standing Inpatient Medications  acetaminophen  IVPB .. 1000 milliGRAM(s) IV Intermittent once  ALBUTerol/ipratropium for Nebulization 3 milliLiter(s) Nebulizer every 6 hours  buDESOnide    Inhalation Suspension 0.5 milliGRAM(s) Inhalation every 12 hours  chlorhexidine 2% Cloths 1 Application(s) Topical daily  enoxaparin Injectable 40 milliGRAM(s) SubCutaneous daily  fat emulsion (Fish Oil and Plant Based) 20% Infusion 16.7 mL/Hr IV Continuous <Continuous>  imipenem/cilastatin  IVPB 500 milliGRAM(s) IV Intermittent every 6 hours  insulin lispro (HumaLOG) corrective regimen sliding scale   SubCutaneous every 4 hours  metoprolol tartrate Injectable 5 milliGRAM(s) IV Push every 6 hours  pantoprazole  Injectable 40 milliGRAM(s) IV Push every 24 hours  Parenteral Nutrition - Adult 1 Each TPN Continuous <Continuous>  Parenteral Nutrition - Adult 1 Each TPN Continuous <Continuous>    PRN Inpatient Medications  benzocaine 15 mG/menthol 3.6 mG Lozenge 1 Lozenge Oral three times a day PRN  HYDROmorphone  Injectable 0.5 milliGRAM(s) IV Push every 3 hours PRN      VITALS/PHYSICAL EXAM  --------------------------------------------------------------------------------  T(C): 36.7 (07-13-19 @ 07:00), Max: 37.9 (07-12-19 @ 15:00)  HR: 97 (07-13-19 @ 10:20) (81 - 109)  BP: 117/66 (07-13-19 @ 10:20) (103/57 - 150/58)  RR: 21 (07-13-19 @ 10:20) (19 - 39)  SpO2: 100% (07-13-19 @ 10:20) (99% - 100%)  Wt(kg): --        07-12-19 @ 07:01  -  07-13-19 @ 07:00  --------------------------------------------------------  IN: 3167.8 mL / OUT: 2295 mL / NET: 872.8 mL    07-13-19 @ 07:01  -  07-13-19 @ 11:48  --------------------------------------------------------  IN: 166 mL / OUT: 185 mL / NET: -19 mL           Physical Exam:    	Gen: NAD  	HEENT: NC/AT, PERRL, mucosa moist, supple neck, clear oropharynx  	Chest: clear  	GI: +softly distended, nontender, no BS                  (+)Ostomy pink & viable.  No gas or stool                  ABthera intact w/ good seal              :  (+)Ching              MSK/Vascular: Passive ROM x4,  no clubbing, cyanosis, nor edema  	Neuro: unable to assess, sedated  	Skin: Warm, good turgor, without rashes          LABS/STUDIES  --------------------------------------------------------------------------------              7.1    20.3  >-----------<  242      [07-13-19 @ 02:28]              21.7     137  |  102  |  56  ----------------------------<  182      [07-13-19 @ 02:28]  3.8   |  25  |  0.78        Ca     8.5     [07-13-19 @ 02:28]      Mg     2.1     [07-13-19 @ 02:28]      Phos  3.6     [07-13-19 @ 02:28]            Ca ionizedBlood Gas Arterial - Calcium, Ionized: 1.18 mmoL/L (07-12-19 @ 02:34)  Blood Gas Calcium, Ionized - Venous: 1.26 mmoL/L (07-13-19 @ 02:05)  Blood Gas Calcium, Ionized - Venous: 1.20 mmoL/L (07-12-19 @ 09:21)  Blood Gas Calcium, Ionized - Venous: 1.23 mmoL/L (07-11-19 @ 21:03)  Blood Gas Calcium, Ionized - Venous: 1.24 mmoL/L (07-11-19 @ 19:43)    Creatinine Trend:  POC glucoseGlucose, Serum: 182 mg/dL (07-13-19 @ 02:28)  CAPILLARY BLOOD GLUCOSE      POCT Blood Glucose.: 165 mg/dL (13 Jul 2019 04:56)  POCT Blood Glucose.: 120 mg/dL (12 Jul 2019 23:07)  POCT Blood Glucose.: 103 mg/dL (12 Jul 2019 22:09)  POCT Blood Glucose.: 97 mg/dL (12 Jul 2019 21:07)  POCT Blood Glucose.: 90 mg/dL (12 Jul 2019 20:10)  POCT Blood Glucose.: 89 mg/dL (12 Jul 2019 18:58)  POCT Blood Glucose.: 80 mg/dL (12 Jul 2019 17:58)  POCT Blood Glucose.: 92 mg/dL (12 Jul 2019 14:32)  POCT Blood Glucose.: 86 mg/dL (12 Jul 2019 14:10)  POCT Blood Glucose.: 112 mg/dL (12 Jul 2019 13:36)  POCT Blood Glucose.: 127 mg/dL (12 Jul 2019 12:21)    PrealbuminPrealbumin, Serum: 5 mg/dL (07-04-19 @ 22:45)    Triglycerides

## 2019-07-13 NOTE — PHYSICAL THERAPY INITIAL EVALUATION ADULT - GENERAL OBSERVATIONS, REHAB EVAL
Pt encountered semi-supine in bed + cardiac monitor, BIPAP, continuous pulse ox, PICC line RUE (TPN running), VAC to abdomen, colostomy (no output), BASIM drains x 2, starr, NGT to LWS, BP cuff LUE, pneumatic teds donned B/L, air tap under pt, daughter Yeimy at bedside

## 2019-07-13 NOTE — PHYSICAL THERAPY INITIAL EVALUATION ADULT - DIAGNOSIS, PT EVAL
Decreased ROM, decreased strength, decreased endurance, impaired balance impaired functional mobility Decreased ROM, decreased strength, decreased endurance, impaired balance impaired functional mobility / integumentary impairment

## 2019-07-13 NOTE — PHYSICAL THERAPY INITIAL EVALUATION ADULT - DID THE PATIENT HAVE SURGERY?
yes/exploratory laparotomy, left hemicolectomy, end colostomy, and ABThera VAC placement 7/1; RTOR 7/3 for Abdominal washout w/ replacement of Abthera vac
yes/please see below

## 2019-07-13 NOTE — PHYSICAL THERAPY INITIAL EVALUATION ADULT - IMPAIRMENTS FOUND, PT EVAL
bed mobility, transfers/aerobic capacity/endurance/ROM/muscle strength/gait, locomotion, and balance

## 2019-07-13 NOTE — PROGRESS NOTE ADULT - SUBJECTIVE AND OBJECTIVE BOX
HISTORY  74y Female with history of HFrEF s/p AICD, asthma, HTN, HLD, atrial fibrillation on Eliquis presented with severe, sudden onset L sided abd pain which started on the morning of 6/30/19 after eating. She endorsed nausea at the time but no vomiting. She did not have any fevers, chills, diarrhea or hematochezia.  CTAP w/ IV contrast showed proximal descending colon/splenic flexure diverticulitis with small amount of free fluid and 3cm low attenuation structure in the right adnexa. She developed dyspnea and wheezing after the CT scan and became hypoxic. Bedside US int he ED demonstrated a few B lines. She received 80mg total of IV L.asix, 125 of solumedrol, Duonebs x2 and Narcan and she was admitted to MICU for management of respiratory and heart failure.     Through the next day (7/1), she was persistently tachycardic with BPs as low as 76/50, MAPs in low 60s. She was bolused with 500cc of crystalloid without improvement in blood pressure. She was then started on phenylephrine. Her lactate was uptrending from 2 to 2.6 to 3 on the arterial blood gas. A repeat CT scan showed acute diverticulitis at the splenic flexure with evidence of contained perforation, but no drainable collection. 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 Pt was left open with Abthera vac. She was taken back to the OR multiple times 7/3, 7/5, and 7/9) for washout of purulent ascites, but was unable to be closed secondary to edematous bowel. She was diuresed and then on 7/11 she was taken to OR and closed with strattice mesh. 2 Wolfgang drains left in place, and wound vac placed over mesh. She returned to SICU intubated on 7/11, and was extubated on 7/12.      24 HOUR EVENTS: - Extubated to BiPAP and remained on BiPAP all day.  - Still no ostomy function, ostomy digitalized with no stool.  - Pacemaker interrogated for tachycardia and runs of PVCs; underlying rhythm sinus tachycardia with PVCs.      SUBJECTIVE/ROS:  [x ] A ten-point review of systems was otherwise negative except as noted.  [ ] Due to altered mental status/intubation, subjective information were not able to be obtained from the patient. History was obtained, to the extent possible, from review of the chart and collateral sources of information.      NEURO  Exam: lethargic but easily arousable, following commands  Meds: acetaminophen  IVPB .. 1000 milliGRAM(s) IV Intermittent once  HYDROmorphone  Injectable 0.5 milliGRAM(s) IV Push every 3 hours PRN pain    [x] Adequacy of sedation and pain control has been assessed and adjusted      RESPIRATORY  RR: 34 (07-13-19 @ 02:00) (19 - 39)  SpO2: 100% (07-13-19 @ 02:00) (97% - 100%)  Exam: unlabored, clear to auscultation bilaterally  Mechanical Ventilation: Mode: CPAP with PS, RR (patient): 28, FiO2: 40, PEEP: 5, PS: 5, MAP: 7  ABG - ( 12 Jul 2019 02:34 )  pH: 7.44  /  pCO2: 40    /  pO2: 86    / HCO3: 27    / Base Excess: 3.2   /  SaO2: 96      Blood Gas Arterial, Lactate: 1.4 mmol/L (07.12.19 @ 02:34)    Meds: ALBUTerol/ipratropium for Nebulization 3 milliLiter(s) Nebulizer every 6 hours  buDESOnide    Inhalation Suspension 0.5 milliGRAM(s) Inhalation every 12 hours        CARDIOVASCULAR  HR: 90 (07-13-19 @ 02:00) (81 - 122)  BP: 107/53 (07-13-19 @ 02:00) (85/50 - 150/58)  BP(mean): 76 (07-13-19 @ 02:00) (63 - 111)  VBG - ( 13 Jul 2019 02:05 )  pH: 7.36  /  pCO2: 48    /  pO2: 42    / HCO3: 27    / Base Excess: 1.7   /  SaO2: 70     Lactate: 1.0      Exam: irregular  Cardiac Rhythm: sinus tach with frequent PACs and PVCs  Perfusion     [x ]Adequate   [ ]Inadequate  Mentation   [ ]Normal       [x ]Reduced  Extremities  [ x]Warm         [ ]Cool  Volume Status [ x]Hypervolemic [ ]Euvolemic [ ]Hypovolemic  Meds: metoprolol tartrate Injectable 5 milliGRAM(s) IV Push every 6 hours        GI/NUTRITION  Exam: soft, nontender, nondistended, wound VAC in place, JPs serosangunious, ostomy pink with no output in bag  Diet: NPO with TPN  Meds: pantoprazole  Injectable 40 milliGRAM(s) IV Push every 24 hours      GENITOURINARY  I&O's Detail    07-11 @ 07:01  -  07-12 @ 07:00  --------------------------------------------------------  IN:    dexmedetomidine Infusion: 25 mL    fat emulsion (Fish Oil and Plant Based) 20% Infusion: 200.4 mL    insulin regular Infusion: 17 mL    insulin regular Infusion: 7 mL    Solution: 100 mL    Solution: 200 mL    Solution: 150 mL    TPN (Total Parenteral Nutrition): 1909 mL  Total IN: 2608.4 mL    OUT:    Drain: 40 mL    Drain: 30 mL    Nasoenteral Tube: 300 mL    Ureteral Catheter: 2305 mL  Total OUT: 2675 mL    Total NET: -66.6 mL      07-12 @ 07:01  -  07-13 @ 02:56  --------------------------------------------------------  IN:    fat emulsion (Fish Oil and Plant Based) 20% Infusion: 116.9 mL    insulin regular Infusion: 42 mL    Solution: 100 mL    Solution: 200 mL    Solution: 300 mL    TPN (Total Parenteral Nutrition): 1411 mL  Total IN: 2169.9 mL    OUT:    Drain: 30 mL    Drain: 60 mL    Ureteral Catheter: 1255 mL  Total OUT: 1345 mL    Total NET: 824.9 mL          07-12    135  |  98  |  58<H>  ----------------------------<  225<H>  4.1   |  26  |  0.82    Ca    8.2<L>      12 Jul 2019 01:38  Phos  3.6     07-12  Mg     1.8     07-12      [ x] Ching catheter, indication: monitoring  Meds: fat emulsion (Fish Oil and Plant Based) 20% Infusion 16.7 mL/Hr IV Continuous <Continuous>  Parenteral Nutrition - Adult 1 Each TPN Continuous <Continuous>        HEMATOLOGIC  Meds: enoxaparin Injectable 40 milliGRAM(s) SubCutaneous daily    [x] VTE Prophylaxis                        7.1    20.3  )-----------( 242      ( 13 Jul 2019 02:28 )             21.7       Transfusion     [ ] PRBC   [ ] Platelets   [ ] FFP   [ ] Cryoprecipitate      INFECTIOUS DISEASES  T(C): 36.1 (07-12-19 @ 23:00), Max: 37.9 (07-12-19 @ 15:00)  WBC Count: 20.3 K/uL (07-13 @ 02:28)    Recent Cultures:  Specimen Source: .Blood, 07-10 @ 20:09; Results   No growth to date.; Gram Stain: --; Organism: --  Specimen Source: .Blood, 07-10 @ 20:08; Results   No growth to date.; Gram Stain: --; Organism: --  Specimen Source: .Body Fluid abdominal fluid, 07-09 @ 17:54; Results   Few Enterococcus faecalis  Few Coag Negative Staphylococcus; Gram Stain:   polymorphonuclear leukocytes seen  No organisms seen  by cytocentrifuge; Organism: Enterococcus faecalis  Coag Negative Staphylococcus  Specimen Source: .Body Fluid abdominal fluid, 07-09 @ 17:46; Results   Few Enterococcus faecalis  Few Coag Negative Staphylococcus; Gram Stain:   polymorphonuclear leukocytes seen  No organisms seen  by cytocentrifuge; Organism: Enterococcus faecalis  Coag Negative Staphylococcus    Meds: imipenem/cilastatin  IVPB 500 milliGRAM(s) IV Intermittent every 6 hours        ENDOCRINE  Capillary Blood Glucose  POCT Blood Glucose.: 120 mg/dL (12 Jul 2019 23:07)  POCT Blood Glucose.: 103 mg/dL (12 Jul 2019 22:09)  POCT Blood Glucose.: 97 mg/dL (12 Jul 2019 21:07)  POCT Blood Glucose.: 90 mg/dL (12 Jul 2019 20:10)  POCT Blood Glucose.: 89 mg/dL (12 Jul 2019 18:58)  POCT Blood Glucose.: 80 mg/dL (12 Jul 2019 17:58)  POCT Blood Glucose.: 92 mg/dL (12 Jul 2019 14:32)    Meds: insulin lispro (HumaLOG) corrective regimen sliding scale   SubCutaneous every 4 hours        ACCESS DEVICES:  [x ] Peripheral IV  [ ] Central Venous Line	[ ] R	[ ] L	[ ] IJ	[ ] Fem	[ ] SC	Placed:   [ ] Arterial Line		[ ] R	[ ] L	[ ] Fem	[ ] Rad	[ ] Ax	Placed:   [x ] PICC: R basilic 7/5					[ ] Mediport  [ x] Urinary Catheter, Date Placed: 7/1  [x ] Necessity of urinary, arterial, and venous catheters discussed    OTHER MEDICATIONS:  benzocaine 15 mG/menthol 3.6 mG Lozenge 1 Lozenge Oral three times a day PRN  chlorhexidine 2% Cloths 1 Application(s) Topical daily      CODE STATUS: full code    IMAGING: x

## 2019-07-13 NOTE — PROGRESS NOTE ADULT - ASSESSMENT
A/P:74F admitted with abdominal pain and exam findings consistent with peritonitis secondary to acute diverticulitis at the splenic flexure with evidence of contained perforation now s/p Exploratory laparotomy, left hemicolectomy, end colostomy, and ABThera Vac placement and s/p from RTOR for abdominal washout and Abthera vac placement, off pressors. TPN started 7/5 for Protein-Calorie Malnutrition    - TPN formula @ goal: Carbohydrates: 210grams, Amino Acid: 120grams, 40g lipids in 2000 ml  - Strict Intake and Output.,   - Weights three times a week  - Monitor BMP, Mg, Ionized Ca, Phosphorus daily  - Pre-albumin weekly.  - Hyperglycemia- insulin drip stopped.  Glycemic control improving as infection improved.  Will decrease insulin from 70U to 55U, continue sliding scale coverage.   - HyperNa-stabilized;  No NaAce or NaCl in TPN  - Continue as per SICU/ Surgery, will follow with you d/w team        TPN team, pager [phone], spectra [phone]  D/w Adela Cross

## 2019-07-13 NOTE — PHYSICAL THERAPY INITIAL EVALUATION ADULT - PRECAUTIONS/LIMITATIONS, REHAB EVAL
Pt now S/p exploratory laparotomy, left hemicolectomy, end colostomy, and ABThera VAC placement 7/1 (transferred to SICU immediately postop requiring high doses of pressors) w/ RTOR 7/3 for Abdominal washout w/ replacement of Abthera vac./surgical precautions
oxygen therapy device and L/min/3L humidified O2

## 2019-07-13 NOTE — PHYSICAL THERAPY INITIAL EVALUATION ADULT - RANGE OF MOTION, PT EVAL
GOAL: pt will demonstrate BUE/BLE AROM WNL's in order to complete all functional mobility independently within 8 weeks

## 2019-07-13 NOTE — PHYSICAL THERAPY INITIAL EVALUATION ADULT - PLANNED THERAPY INTERVENTIONS, PT EVAL
balance training/bed mobility training/strengthening/ROM/transfer training/gait training gait training/ROM/transfer training/wound management/balance training/strengthening/bed mobility training

## 2019-07-13 NOTE — PROGRESS NOTE ADULT - ASSESSMENT
ASSESSMENT  74F admitted with abdominal pain and exam findings consistent with peritonitis secondary to acute diverticulitis at the splenic flexure with evidence of contained perforation s/p Exploratory laparotomy, left hemicolectomy, end colostomy, and ABThera Vac placement (7/2) and s/p multiple RTOR for abdominal washout and abthera vac placement (7/3, 7/5, 7/9), closure with strattice mesh and wound vac placement (7/11)    PLAN:  NEURO: acute pain control  - Pain control with Dilaudid PRN and IV Tylenol    RESPIRATORY:  PMHx of Asthma  - AM CXR to assess pulmonary vascular congestion  - Continue Duonebs and pulmicort  - Continue diuresis as needed  - Wean from BiPAP to nasal cannula as tolerated    CARDIOVASCULAR:  atrial fibrillation, HFrEF s/p AICD, non-sustained runs of V-tach   - Metoprolol 5mg q 6hrs   - Hold home anti-hypertensives  - Trend daily weight     GI/NUTRITION: s/p ex lap and L hemicolectomy with end colostomy and ABThera VAC placement and washout 7/2, s/p closure with strattice mesh and wound vac 7/11  - NPO, NGT   - Continue TPN   - Protonix for stress ulcer prophylaxis  - Monitor for ostomy function      GENITOURINARY/RENAL: CKD stage 3  - Ching for strict I&Os - consider D/Cing  - Continue diuresis as needed    HEMATOLOGIC:  * Patient is Faith & does NOT accept blood products*  - Minimize blood draws - use pediatric tubes  - Lovenox for DVT ppx  - Hold ASA and full anticoagulation    INFECTIOUS DISEASE: Enterococcus in abdominal fluid culture  - Continue imipenem   - Repeat blood cultures if febrile    ENDOCRINE: DM   - ISS  - insulin in TPN 70 units      DISPO: SICU. Full code.    -Trini Santillan PA-C  [id number]

## 2019-07-13 NOTE — PHYSICAL THERAPY INITIAL EVALUATION ADULT - ACTIVE RANGE OF MOTION EXAMINATION, REHAB EVAL
deficits as listed below/BUE shoulder flexion to 80 degrees, BLE hip flexion lacking by at least 25-50% from normal range, BLE knee flexion lacking at least 25% from normal range

## 2019-07-13 NOTE — PHYSICAL THERAPY INITIAL EVALUATION ADULT - BALANCE TRAINING, PT EVAL
GOAL: pt will be able to independently sit at edge of bed while performing UE manipulation without loss of balance within 8 weeks. pt will be able to independently ambulate 25ft with rollator without loss of balance within 8 weeks

## 2019-07-13 NOTE — PROGRESS NOTE ADULT - ASSESSMENT
A/P: 74F with perforated diverticulitis s/p exploratory laparotomy, left hemicolectomy, end colostomy, and ABThera Vac placement, s/p 3x RTOR for abdominal washout and abthera vac placement, s/p abdominal washout, abdomen closed with Strattice, 2 garcia drains placed (upper abdomen and pelvis), black vac placed over mesh.    - NPO, TPN, awaiting resolution of ileus  - Continue ABx, f/u cx  - Bedside vac change sunday  - Skin graft over mesh once granulated in 2-4 weeks, will discuss with Plastics  - Appreciate excellent SICU care    Green [phone].

## 2019-07-13 NOTE — PROGRESS NOTE ADULT - ATTENDING COMMENTS
- weaned off bipap this AM  - alert and oriented, pain well controlled  - NC 3 liters, wean to RA with SPO2>92  - AM CXR appears clear  - net pos 870 cc yesterday  - minimal drain output, VAC functional, ostomy serosang output  - goal even today, diuresis as required  - imipenem/cilastatin per ID to continue to end 16th  - TPN to continue, off insulin since PM last night, TPN service to adjust insulin accordingly  - no OR overnight   - OOB this AM  - continue to restrictive with blood draws    will need to remain in the ICU for respiratory monitoring and potential for hemodynamic compromise

## 2019-07-13 NOTE — PHYSICAL THERAPY INITIAL EVALUATION ADULT - PERTINENT HX OF CURRENT PROBLEM, REHAB EVAL
73 yo F PMHx of HFrEF s/p AICD, asthma, HTN, HLD, a-fib p/w severe, sudden onset L sided abd pain that started this AM. Found to have acute diverticulitis w/ worsening abdominal pain, CT scan on admmission demonstrating new contained perforation. Course c/b acute hypoxic respiratory failure thought secondary to CHF exacerbation vs bronchospasm requiring MICU stay (requiring BIPAP, No intubation). Cont'd...
Pt is a 73yo F admited secondary to perforated diverticulitis. Pt is s/p L hemicolectomy with colostomy & exploratory laparotomy on 7/2, s/p abdominal washout with ABthera VAC application on 7/3, s/p abdominal washout with ABthera VAC application on 7/5, s/p abdominal washout with ABthera VAC application on 7/9, & s/p open repair of incisional hernia with use of biological mesh & VAC application on 7/11. Pt remains in SICU for further management.

## 2019-07-14 LAB
ANION GAP SERPL CALC-SCNC: 11 MMOL/L — SIGNIFICANT CHANGE UP (ref 5–17)
BUN SERPL-MCNC: 46 MG/DL — HIGH (ref 7–23)
CALCIUM SERPL-MCNC: 8.1 MG/DL — LOW (ref 8.4–10.5)
CHLORIDE SERPL-SCNC: 104 MMOL/L — SIGNIFICANT CHANGE UP (ref 96–108)
CO2 SERPL-SCNC: 22 MMOL/L — SIGNIFICANT CHANGE UP (ref 22–31)
CREAT SERPL-MCNC: 0.64 MG/DL — SIGNIFICANT CHANGE UP (ref 0.5–1.3)
CULTURE RESULTS: SIGNIFICANT CHANGE UP
CULTURE RESULTS: SIGNIFICANT CHANGE UP
GLUCOSE BLDC GLUCOMTR-MCNC: 150 MG/DL — HIGH (ref 70–99)
GLUCOSE BLDC GLUCOMTR-MCNC: 171 MG/DL — HIGH (ref 70–99)
GLUCOSE BLDC GLUCOMTR-MCNC: 174 MG/DL — HIGH (ref 70–99)
GLUCOSE BLDC GLUCOMTR-MCNC: 179 MG/DL — HIGH (ref 70–99)
GLUCOSE BLDC GLUCOMTR-MCNC: 196 MG/DL — HIGH (ref 70–99)
GLUCOSE SERPL-MCNC: 175 MG/DL — HIGH (ref 70–99)
HCT VFR BLD CALC: 21.9 % — LOW (ref 34.5–45)
HCT VFR BLD CALC: 35.8 % — SIGNIFICANT CHANGE UP (ref 34.5–45)
HGB BLD-MCNC: 10.3 G/DL — LOW (ref 11.5–15.5)
HGB BLD-MCNC: 7.4 G/DL — LOW (ref 11.5–15.5)
MAGNESIUM SERPL-MCNC: 1.8 MG/DL — SIGNIFICANT CHANGE UP (ref 1.6–2.6)
MCHC RBC-ENTMCNC: 28.3 PG — SIGNIFICANT CHANGE UP (ref 27–34)
MCHC RBC-ENTMCNC: 28.8 GM/DL — LOW (ref 32–36)
MCHC RBC-ENTMCNC: 33.1 PG — SIGNIFICANT CHANGE UP (ref 27–34)
MCHC RBC-ENTMCNC: 34 GM/DL — SIGNIFICANT CHANGE UP (ref 32–36)
MCV RBC AUTO: 97.4 FL — SIGNIFICANT CHANGE UP (ref 80–100)
MCV RBC AUTO: 98.2 FL — SIGNIFICANT CHANGE UP (ref 80–100)
ORGANISM # SPEC MICROSCOPIC CNT: SIGNIFICANT CHANGE UP
PHOSPHATE SERPL-MCNC: 3.2 MG/DL — SIGNIFICANT CHANGE UP (ref 2.5–4.5)
PLATELET # BLD AUTO: 141 K/UL — LOW (ref 150–400)
PLATELET # BLD AUTO: 251 K/UL — SIGNIFICANT CHANGE UP (ref 150–400)
POTASSIUM SERPL-MCNC: 5.1 MMOL/L — SIGNIFICANT CHANGE UP (ref 3.5–5.3)
POTASSIUM SERPL-SCNC: 5.1 MMOL/L — SIGNIFICANT CHANGE UP (ref 3.5–5.3)
RBC # BLD: 2.25 M/UL — LOW (ref 3.8–5.2)
RBC # BLD: 3.65 M/UL — LOW (ref 3.8–5.2)
RBC # FLD: 19.5 % — HIGH (ref 10.3–14.5)
RBC # FLD: 20.3 % — HIGH (ref 10.3–14.5)
SODIUM SERPL-SCNC: 137 MMOL/L — SIGNIFICANT CHANGE UP (ref 135–145)
SPECIMEN SOURCE: SIGNIFICANT CHANGE UP
SPECIMEN SOURCE: SIGNIFICANT CHANGE UP
WBC # BLD: 12.3 K/UL — HIGH (ref 3.8–10.5)
WBC # BLD: 18.1 K/UL — HIGH (ref 3.8–10.5)
WBC # FLD AUTO: 12.3 K/UL — HIGH (ref 3.8–10.5)
WBC # FLD AUTO: 18.1 K/UL — HIGH (ref 3.8–10.5)

## 2019-07-14 PROCEDURE — 99232 SBSQ HOSP IP/OBS MODERATE 35: CPT

## 2019-07-14 PROCEDURE — 71045 X-RAY EXAM CHEST 1 VIEW: CPT | Mod: 26

## 2019-07-14 RX ORDER — INSULIN LISPRO 100/ML
VIAL (ML) SUBCUTANEOUS EVERY 6 HOURS
Refills: 0 | Status: DISCONTINUED | OUTPATIENT
Start: 2019-07-14 | End: 2019-07-20

## 2019-07-14 RX ORDER — MAGNESIUM SULFATE 500 MG/ML
2 VIAL (ML) INJECTION ONCE
Refills: 0 | Status: COMPLETED | OUTPATIENT
Start: 2019-07-14 | End: 2019-07-14

## 2019-07-14 RX ORDER — I.V. FAT EMULSION 20 G/100ML
16.7 EMULSION INTRAVENOUS
Qty: 40 | Refills: 0 | Status: DISCONTINUED | OUTPATIENT
Start: 2019-07-14 | End: 2019-07-15

## 2019-07-14 RX ORDER — ACETAMINOPHEN 500 MG
1000 TABLET ORAL ONCE
Refills: 0 | Status: COMPLETED | OUTPATIENT
Start: 2019-07-14 | End: 2019-07-14

## 2019-07-14 RX ORDER — ELECTROLYTE SOLUTION,INJ
1 VIAL (ML) INTRAVENOUS
Refills: 0 | Status: DISCONTINUED | OUTPATIENT
Start: 2019-07-14 | End: 2019-07-14

## 2019-07-14 RX ORDER — FUROSEMIDE 40 MG
20 TABLET ORAL ONCE
Refills: 0 | Status: COMPLETED | OUTPATIENT
Start: 2019-07-14 | End: 2019-07-14

## 2019-07-14 RX ADMIN — Medication 2: at 05:17

## 2019-07-14 RX ADMIN — Medication 3 MILLILITER(S): at 12:30

## 2019-07-14 RX ADMIN — Medication 2: at 01:39

## 2019-07-14 RX ADMIN — IMIPENEM AND CILASTATIN 100 MILLIGRAM(S): 250; 250 INJECTION, POWDER, FOR SOLUTION INTRAVENOUS at 05:25

## 2019-07-14 RX ADMIN — Medication 0.5 MILLIGRAM(S): at 06:16

## 2019-07-14 RX ADMIN — IMIPENEM AND CILASTATIN 100 MILLIGRAM(S): 250; 250 INJECTION, POWDER, FOR SOLUTION INTRAVENOUS at 19:37

## 2019-07-14 RX ADMIN — IRON SUCROSE 110 MILLIGRAM(S): 20 INJECTION, SOLUTION INTRAVENOUS at 22:18

## 2019-07-14 RX ADMIN — Medication 400 MILLIGRAM(S): at 21:16

## 2019-07-14 RX ADMIN — IMIPENEM AND CILASTATIN 100 MILLIGRAM(S): 250; 250 INJECTION, POWDER, FOR SOLUTION INTRAVENOUS at 11:00

## 2019-07-14 RX ADMIN — Medication 2: at 10:06

## 2019-07-14 RX ADMIN — Medication 2: at 14:13

## 2019-07-14 RX ADMIN — Medication 1 EACH: at 18:24

## 2019-07-14 RX ADMIN — Medication 50 GRAM(S): at 01:39

## 2019-07-14 RX ADMIN — PANTOPRAZOLE SODIUM 40 MILLIGRAM(S): 20 TABLET, DELAYED RELEASE ORAL at 05:17

## 2019-07-14 RX ADMIN — Medication 1000 MILLIGRAM(S): at 05:25

## 2019-07-14 RX ADMIN — I.V. FAT EMULSION 16.7 ML/HR: 20 EMULSION INTRAVENOUS at 18:25

## 2019-07-14 RX ADMIN — Medication 5 MILLIGRAM(S): at 05:17

## 2019-07-14 RX ADMIN — Medication 5 MILLIGRAM(S): at 11:16

## 2019-07-14 RX ADMIN — ENOXAPARIN SODIUM 40 MILLIGRAM(S): 100 INJECTION SUBCUTANEOUS at 11:00

## 2019-07-14 RX ADMIN — Medication 400 MILLIGRAM(S): at 05:10

## 2019-07-14 RX ADMIN — Medication 20 MILLIGRAM(S): at 10:56

## 2019-07-14 RX ADMIN — Medication 400 MILLIGRAM(S): at 11:00

## 2019-07-14 RX ADMIN — CHLORHEXIDINE GLUCONATE 1 APPLICATION(S): 213 SOLUTION TOPICAL at 05:17

## 2019-07-14 RX ADMIN — Medication 3 MILLILITER(S): at 19:37

## 2019-07-14 RX ADMIN — Medication 3 MILLILITER(S): at 00:23

## 2019-07-14 RX ADMIN — Medication 3 MILLILITER(S): at 06:17

## 2019-07-14 RX ADMIN — Medication 1000 MILLIGRAM(S): at 11:17

## 2019-07-14 RX ADMIN — Medication 1000 MILLIGRAM(S): at 21:46

## 2019-07-14 RX ADMIN — Medication 0.5 MILLIGRAM(S): at 19:36

## 2019-07-14 NOTE — PROGRESS NOTE ADULT - ATTENDING COMMENTS
74yr F Confucianist with diverticulitis complicated by abdominal perfs and open abdomen with multiple operations now with stratos mesh and VAC in open skin.     -alert and awake and appropriate  - AM CXR unchanged, still mild pulm edema  - on RA  - net pos yesterday, urine output ~ 2liters, without diuretics yesterday  - NG output 200, BASIM 150 , lower BASIM 70, VAC midline, no ostomy function  - cr 0.64, net goal even, lasix 20mg now and check in PM for urine output  - plan by plastics for decubitus ulcer management  - imipenem for intraabdominal collection due to finish 16th  - adjust insulin in TPN today  - dc starr  - to discuss with primary team to floor

## 2019-07-14 NOTE — PROGRESS NOTE ADULT - ASSESSMENT
Ms. Dolan is a  74yof with perforated diverticulitis s/p exploratory laparotomy, left hemicolectomy, end colostomy, and ABThera Vac placement, s/p 3x RTOR for abdominal washout and abthera vac placement, abdomen closed with Strattice, 2 garcia drains placed (upper abdomen and pelvis), black vac placed over mesh.    Likely will need a skin graft over mesh once granulated in 2-4 weeks, will discuss with Plastics  F/u cardiology recs  F/u intraabdominal fluid cultures  Appreciate excellent SICU care    Green [phone].

## 2019-07-14 NOTE — PROGRESS NOTE ADULT - SUBJECTIVE AND OBJECTIVE BOX
[facility] NUTRITION SUPPORT / TPN -- FOLLOW UP NOTE  --------------------------------------------------------------------------------    24 hour events/subjective:  no acute overnight events.  Pain controlled.  denies SOB/CP.  Remains NPO/NGT    Diet:  Diet, NPO:   Except Medications (07-09-19 @ 14:09)      PAST HISTORY  --------------------------------------------------------------------------------  No significant changes to PMH, PSH, FHx, SHx, unless otherwise noted    ALLERGIES & MEDICATIONS  --------------------------------------------------------------------------------  Allergies    Coreg (Other)  digoxin (Other; Short breath (Mild to Mod))  Lopressor (Short breath)  penicillins (Hives)    Intolerances    metoprolol (Other)  Solu-Medrol (Other (Mild to Mod))    Standing Inpatient Medications  acetaminophen  IVPB .. 1000 milliGRAM(s) IV Intermittent once  ALBUTerol/ipratropium for Nebulization 3 milliLiter(s) Nebulizer every 6 hours  buDESOnide    Inhalation Suspension 0.5 milliGRAM(s) Inhalation every 12 hours  chlorhexidine 2% Cloths 1 Application(s) Topical daily  enoxaparin Injectable 40 milliGRAM(s) SubCutaneous daily  fat emulsion (Fish Oil and Plant Based) 20% Infusion 16.7 mL/Hr IV Continuous <Continuous>  imipenem/cilastatin  IVPB 500 milliGRAM(s) IV Intermittent every 6 hours  insulin lispro (HumaLOG) corrective regimen sliding scale   SubCutaneous every 4 hours  iron sucrose IVPB 200 milliGRAM(s) IV Intermittent every 24 hours  metoprolol tartrate Injectable 5 milliGRAM(s) IV Push every 6 hours  pantoprazole  Injectable 40 milliGRAM(s) IV Push every 24 hours  Parenteral Nutrition - Adult 1 Each TPN Continuous <Continuous>    PRN Inpatient Medications  benzocaine 15 mG/menthol 3.6 mG Lozenge 1 Lozenge Oral three times a day PRN  HYDROmorphone  Injectable 0.5 milliGRAM(s) IV Push every 3 hours PRN      VITALS/PHYSICAL EXAM  --------------------------------------------------------------------------------  T(C): 36.9 (07-14-19 @ 07:00), Max: 37.3 (07-13-19 @ 15:00)  HR: 104 (07-14-19 @ 09:00) (91 - 110)  BP: 123/59 (07-14-19 @ 09:00) (97/51 - 130/61)  RR: 37 (07-14-19 @ 09:00) (19 - 46)  SpO2: 95% (07-14-19 @ 09:00) (95% - 100%)  Wt(kg): --        07-13-19 @ 07:01  -  07-14-19 @ 07:00  --------------------------------------------------------  IN: 3059.1 mL / OUT: 2595 mL / NET: 464.1 mL    07-14-19 @ 07:01  -  07-14-19 @ 09:55  --------------------------------------------------------  IN: 166 mL / OUT: 175 mL / NET: -9 mL          Physical Exam:    	Gen: NAD, NGT in place  	HEENT: NC/AT, PERRL, mucosa moist, supple neck, clear oropharynx  	Chest: clear  	GI: +softly distended, nontender, no BS                  (+)Ostomy pink & viable.  No gas or stool                  VAC intact w/ good seal              :  (+)Ching              MSK/Vascular: Passive ROM x4,  no clubbing, cyanosis, nor edema  	Neuro: Awake and alert  	Skin: Warm, good turgor, without rashes              PICC dressing C/D/I        LABS/STUDIES  --------------------------------------------------------------------------------              7.4    18.1  >-----------<  251      [07-14-19 @ 02:02]              21.9     137  |  104  |  46  ----------------------------<  175      [07-14-19 @ 00:33]  5.1   |  22  |  0.64        Ca     8.1     [07-14-19 @ 00:33]      Mg     1.8     [07-14-19 @ 00:33]      Phos  3.2     [07-14-19 @ 00:33]            Ca ionizedBlood Gas Calcium, Ionized - Venous: 1.26 mmoL/L (07-13-19 @ 02:05)    Creatinine Trend:  POC glucoseGlucose, Serum: 175 mg/dL (07-14-19 @ 00:33)  CAPILLARY BLOOD GLUCOSE      POCT Blood Glucose.: 179 mg/dL (14 Jul 2019 05:15)  POCT Blood Glucose.: 174 mg/dL (14 Jul 2019 01:14)  POCT Blood Glucose.: 183 mg/dL (13 Jul 2019 21:35)  POCT Blood Glucose.: 164 mg/dL (13 Jul 2019 17:04)  POCT Blood Glucose.: 139 mg/dL (13 Jul 2019 14:45)  POCT Blood Glucose.: 182 mg/dL (13 Jul 2019 11:48)    PrealbuminPrealbumin, Serum: 5 mg/dL (07-04-19 @ 22:45)    Triglycerides

## 2019-07-14 NOTE — PROGRESS NOTE ADULT - SUBJECTIVE AND OBJECTIVE BOX
HISTORY  74y Female with history of HFrEF s/p AICD, asthma, HTN, HLD, atrial fibrillation on Eliquis presented with severe, sudden onset L sided abd pain which started on the morning of 6/30/19 after eating. She endorsed nausea at the time but no vomiting. She did not have any fevers, chills, diarrhea or hematochezia.  CTAP w/ IV contrast showed proximal descending colon/splenic flexure diverticulitis with small amount of free fluid and 3cm low attenuation structure in the right adnexa. She developed dyspnea and wheezing after the CT scan and became hypoxic. Bedside US int he ED demonstrated a few B lines. She received 80mg total of IV L.asix, 125 of solumedrol, Duonebs x2 and Narcan and she was admitted to MICU for management of respiratory and heart failure.     Through the next day (7/1), she was persistently tachycardic with BPs as low as 76/50, MAPs in low 60s. She was bolused with 500cc of crystalloid without improvement in blood pressure. She was then started on phenylephrine. Her lactate was uptrending from 2 to 2.6 to 3 on the arterial blood gas. A repeat CT scan showed acute diverticulitis at the splenic flexure with evidence of contained perforation, but no drainable collection. 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 Pt was left open with Abthera vac. She was taken back to the OR multiple times 7/3, 7/5, and 7/9) for washout of purulent ascites, but was unable to be closed secondary to edematous bowel. She was diuresed and then on 7/11 she was taken to OR and closed with strattice mesh. 2 Wolfgang drains left in place, and wound vac placed over mesh. She returned to SICU intubated on 7/11.    24 HOUR EVENTS:  - RA, O2 99  - no ostomy function  - H/H stable 7.4/21.9  SUBJECTIVE/ROS:  [x] A ten-point review of systems was otherwise negative except as noted.  [ ] Due to altered mental status/intubation, subjective information were not able to be obtained from the patient. History was obtained, to the extent possible, from review of the chart and collateral sources of information.      NEURO  Exam: lethargic but arousable, follows commands  Meds: acetaminophen  IVPB .. 1000 milliGRAM(s) IV Intermittent once  acetaminophen  IVPB .. 1000 milliGRAM(s) IV Intermittent once  HYDROmorphone  Injectable 0.5 milliGRAM(s) IV Push every 3 hours PRN pain    [x] Adequacy of sedation and pain control has been assessed and adjusted      RESPIRATORY  RR: 29 (07-14-19 @ 02:00) (19 - 46)  SpO2: 100% (07-14-19 @ 02:00) (96% - 100%)  Wt(kg): --  Exam: unlabored, clear to auscultation bilaterally  Mechanical Ventilation:    [N/A] Extubation Readiness Assessed  Meds: ALBUTerol/ipratropium for Nebulization 3 milliLiter(s) Nebulizer every 6 hours  buDESOnide    Inhalation Suspension 0.5 milliGRAM(s) Inhalation every 12 hours        CARDIOVASCULAR  HR: 102 (07-14-19 @ 02:00) (81 - 110)  BP: 101/52 (07-14-19 @ 02:00) (97/51 - 135/58)  BP(mean): 71 (07-14-19 @ 02:00) (69 - 88)  ABP: --  ABP(mean): --  Wt(kg): --  CVP(cm H2O): --  VBG - ( 13 Jul 2019 02:05 )  pH: 7.36  /  pCO2: 48    /  pO2: 42    / HCO3: 27    / Base Excess: 1.7   /  SaO2: 70     Lactate: 1.0                Exam: irregularly irregular rate and rhythm  Cardiac Rhythm: sinus  Perfusion     [x]Adequate   [ ]Inadequate  Mentation   [x]Normal       [ ]Reduced  Extremities  [x]Warm         [ ]Cool  Volume Status [ ]Hypervolemic [x]Euvolemic [ ]Hypovolemic  Meds: metoprolol tartrate Injectable 5 milliGRAM(s) IV Push every 6 hours        GI/NUTRITION  Exam: soft, nontender, nondistended, wound vac in place, JPs serosanguinous, ostomy pink with no output in bag  Diet: NPO TPN  Meds: pantoprazole  Injectable 40 milliGRAM(s) IV Push every 24 hours      GENITOURINARY  I&O's Detail    07-12 @ 07:01 - 07-13 @ 07:00  --------------------------------------------------------  IN:    fat emulsion (Fish Oil and Plant Based) 20% Infusion: 233.8 mL    insulin regular Infusion: 42 mL    Solution: 100 mL    Solution: 400 mL    Solution: 300 mL    Solution: 100 mL    TPN (Total Parenteral Nutrition): 1992 mL  Total IN: 3167.8 mL    OUT:    Drain: 30 mL    Drain: 60 mL    Nasoenteral Tube: 275 mL    Ureteral Catheter: 1930 mL  Total OUT: 2295 mL    Total NET: 872.8 mL      07-13 @ 07:01 - 07-14 @ 02:40  --------------------------------------------------------  IN:    fat emulsion (Fish Oil and Plant Based) 20% Infusion: 150.3 mL    Solution: 300 mL    Solution: 50 mL    Solution: 200 mL    Solution: 100 mL    TPN (Total Parenteral Nutrition): 1577 mL  Total IN: 2377.3 mL    OUT:    Drain: 55 mL    Drain: 95 mL    Nasoenteral Tube: 200 mL    Ureteral Catheter: 1560 mL    VAC (Vacuum Assisted Closure) System: 25 mL  Total OUT: 1935 mL    Total NET: 442.3 mL          07-14    137  |  104  |  46<H>  ----------------------------<  175<H>  5.1   |  22  |  0.64    Ca    8.1<L>      14 Jul 2019 00:33  Phos  3.2     07-14  Mg     1.8     07-14      [x] Ching catheter, indication: N/A  Meds: fat emulsion (Fish Oil and Plant Based) 20% Infusion 16.7 mL/Hr IV Continuous <Continuous>  iron sucrose IVPB 200 milliGRAM(s) IV Intermittent every 24 hours  Parenteral Nutrition - Adult 1 Each TPN Continuous <Continuous>        HEMATOLOGIC  Meds: enoxaparin Injectable 40 milliGRAM(s) SubCutaneous daily    [x] VTE Prophylaxis                        7.4    18.1  )-----------( 251      ( 14 Jul 2019 02:02 )             21.9       Transfusion     [ ] PRBC   [ ] Platelets   [ ] FFP   [ ] Cryoprecipitate      INFECTIOUS DISEASES  WBC Count: 18.1 K/uL (07-14 @ 02:02)  WBC Count: 12.3 K/uL (07-14 @ 00:33)    RECENT CULTURES:  Specimen Source: .Blood  Date/Time: 07-10 @ 20:09  Culture Results:   No growth to date.  Gram Stain: --  Organism: --  Specimen Source: .Blood  Date/Time: 07-10 @ 20:08  Culture Results:   No growth to date.  Gram Stain: --  Organism: --  Specimen Source: .Body Fluid abdominal fluid  Date/Time: 07-09 @ 17:54  Culture Results:   Few Enterococcus faecalis  Few Coag Negative Staphylococcus  Gram Stain:   polymorphonuclear leukocytes seen  No organisms seen  by cytocentrifuge  Organism: Enterococcus faecalis  Coag Negative Staphylococcus  Specimen Source: .Body Fluid abdominal fluid  Date/Time: 07-09 @ 17:46  Culture Results:   Few Enterococcus faecalis  Few Coag Negative Staphylococcus  Gram Stain:   polymorphonuclear leukocytes seen  No organisms seen  by cytocentrifuge  Organism: Enterococcus faecalis  Coag Negative Staphylococcus    Meds: imipenem/cilastatin  IVPB 500 milliGRAM(s) IV Intermittent every 6 hours        ENDOCRINE  CAPILLARY BLOOD GLUCOSE      POCT Blood Glucose.: 174 mg/dL (14 Jul 2019 01:14)  POCT Blood Glucose.: 183 mg/dL (13 Jul 2019 21:35)  POCT Blood Glucose.: 164 mg/dL (13 Jul 2019 17:04)  POCT Blood Glucose.: 139 mg/dL (13 Jul 2019 14:45)  POCT Blood Glucose.: 182 mg/dL (13 Jul 2019 11:48)  POCT Blood Glucose.: 165 mg/dL (13 Jul 2019 04:56)    Meds: insulin lispro (HumaLOG) corrective regimen sliding scale   SubCutaneous every 4 hours        ACCESS DEVICES:  [x] Peripheral IV  [ ] Central Venous Line	[ ] R	[ ] L	[ ] IJ	[ ] Fem	[ ] SC	Placed:   [ ] Arterial Line		[ ] R	[ ] L	[ ] Fem	[ ] Rad	[ ] Ax	Placed:   [x] PICC:	 R basilic 7/5				[ ] Mediport  [x] Urinary Catheter, Date Placed: 7/1  [x] Necessity of urinary, arterial, and venous catheters discussed    OTHER MEDICATIONS:  benzocaine 15 mG/menthol 3.6 mG Lozenge 1 Lozenge Oral three times a day PRN  chlorhexidine 2% Cloths 1 Application(s) Topical daily      CODE STATUS: full code      IMAGING: CXR 7/13 - resolution of prior small right pleural effusion and right atelectasis

## 2019-07-14 NOTE — PROGRESS NOTE ADULT - ASSESSMENT
A/P:74F admitted with abdominal pain and exam findings consistent with peritonitis secondary to acute diverticulitis at the splenic flexure with evidence of contained perforation now s/p Exploratory laparotomy, left hemicolectomy, end colostomy, and ABThera Vac placement and s/p from RTOR for abdominal washout and Abthera vac placement, off pressors. TPN started 7/5 for Protein-Calorie Malnutrition    - TPN formula @ goal: Carbohydrates: 210grams, Amino Acid: 120grams, 40g lipids in 2000 ml  - Strict Intake and Output.,   - Weights three times a week  - Monitor BMP, Mg, Ionized Ca, Phosphorus daily  - Pre-albumin weekly.  - Hyperglycemia- insulin drip stopped.  Glycemic control improving as infection improved.  Will decrease insulin from 70U to 55U, continue sliding scale coverage.   - HyperNa-stabilized;  No NaAce or NaCl in TPN  - Continue as per SICU/ Surgery, will follow with you d/w team        TPN team, pager [phone], spectra [phone]  D/w Adela Cross A/P:74F admitted with abdominal pain and exam findings consistent with peritonitis secondary to acute diverticulitis at the splenic flexure with evidence of contained perforation now s/p Exploratory laparotomy, left hemicolectomy, end colostomy, and ABThera Vac placement and s/p from RTOR for abdominal washout and Abthera vac placement, off pressors. TPN started 7/5 for Protein-Calorie Malnutrition    - TPN formula @ goal: Carbohydrates: 210grams, Amino Acid: 120grams, 40g lipids in 2000 ml  - Strict Intake and Output.,   - Weights three times a week  - Monitor BMP, Mg, Ionized Ca, Phosphorus daily  - Pre-albumin weekly.  - Hyperglycemia- will increase insulin from 55U to 60U today.  Continue sliding scale coverage.   - Hyperkalemia - KCl decreased from 100 meq to 60 meq today.  - HyperNa-stabilized;  No NaAce or NaCl in TPN  - Continue as per SICU/ Surgery        TPN team, pager [phone], spectra [phone]  D/w Adela Cross

## 2019-07-14 NOTE — PROGRESS NOTE ADULT - ASSESSMENT
ASSESSMENT  74F admitted with abdominal pain and exam findings consistent with peritonitis secondary to acute diverticulitis at the splenic flexure with evidence of contained perforation s/p Exploratory laparotomy, left hemicolectomy, end colostomy, and ABThera Vac placement (7/2) and s/p multiple RTOR for abdominal washout and abthera vac placement (7/3, 7/5, 7/9), closure with strattice mesh and wound vac placement (7/11)    PLAN:  NEURO: acute pain control  - Pain control with Dilaudid PRN and IV Tylenol    RESPIRATORY:  PMHx of Asthma  - AM CXR to assess pulmonary vascular congestion  - Continue Duonebs and pulmicort  - Continue diuresis as needed  - RA    CARDIOVASCULAR:  atrial fibrillation, HFrEF s/p AICD, non-sustained runs of V-tach   - Metoprolol 5mg q 6hrs   - Hold home anti-hypertensives  - Trend daily weight     GI/NUTRITION: s/p ex lap and L hemicolectomy with end colostomy and ABThera VAC placement and washout 7/2, s/p closure with strattice mesh and wound vac 7/11  - NPO, NGT   - Continue TPN   - Protonix for stress ulcer prophylaxis  - Monitor for ostomy function    GENITOURINARY/RENAL: CKD stage 3  - Ching for strict I&Os - consider D/Cing  - Continue diuresis as needed    HEMATOLOGIC:  * Patient is [employer] & does NOT accept blood products*  - Minimize blood draws - use pediatric tubes  - H/H stable 7.4/21.9  - Lovenox for DVT ppx  - Hold ASA and full anticoagulation    INFECTIOUS DISEASE: Enterococcus in abdominal fluid culture  - Continue imipenem until 7/16  - Repeat blood cultures if febrile    ENDOCRINE: DM   - ISS  - insulin in TPN 70 units    DISPO: SICU. Full code.    SICU  [id number]

## 2019-07-14 NOTE — PROGRESS NOTE ADULT - SUBJECTIVE AND OBJECTIVE BOX
Interval Events:    Removed from Bipap, on NC  OOB into chair    S: Patient doing well, denies fevers, chills, nausea, emesis, chest pain, SOB.  Says she is not experiencing significant abdominal pain.  Still no flatus or BM.    O: Vital Signs  T(C): 37.2 (07-13 @ 23:00), Max: 37.3 (07-13 @ 15:00)  HR: 102 (07-14 @ 02:00) (81 - 110)  BP: 101/52 (07-14 @ 02:00) (97/51 - 135/58)  RR: 29 (07-14 @ 02:00) (19 - 46)  SpO2: 100% (07-14 @ 02:00) (96% - 100%)  07-12-19 @ 07:01  -  07-13-19 @ 07:00  --------------------------------------------------------  IN: 3167.8 mL / OUT: 2295 mL / NET: 872.8 mL    07-13-19 @ 07:01  -  07-14-19 @ 03:01  --------------------------------------------------------  IN: 2377.3 mL / OUT: 1935 mL / NET: 442.3 mL      General: alert and oriented, NAD. NG tube in place.  Central line secured in place  Resp: airway patent, respirations unlabored  CVS: regular rate and rhythm  Abdomen: soft, nontender, nondistended ostomy pink and viable with empty bag, wound vac in place with good suction over midline incision, JPx2 in place with serous output, one in upper abdomen and the other in the pelvis. Ching draining yellow urine  Extremities: no edema  Skin: warm, dry, appropriate color                          7.4    18.1  )-----------( 251      ( 14 Jul 2019 02:02 )             21.9   07-14    137  |  104  |  46<H>  ----------------------------<  175<H>  5.1   |  22  |  0.64    Ca    8.1<L>      14 Jul 2019 00:33  Phos  3.2     07-14  Mg     1.8     07-14 Interval Events:    Removed from Bipap, on NC  OOB into chair    S: Patient doing well, denies fevers, chills, nausea, emesis, chest pain, SOB.  Says she is not experiencing significant abdominal pain. Still no ostomy function.    O: Vital Signs  T(C): 37.2 (07-13 @ 23:00), Max: 37.3 (07-13 @ 15:00)  HR: 102 (07-14 @ 02:00) (81 - 110)  BP: 101/52 (07-14 @ 02:00) (97/51 - 135/58)  RR: 29 (07-14 @ 02:00) (19 - 46)  SpO2: 100% (07-14 @ 02:00) (96% - 100%)  07-12-19 @ 07:01  -  07-13-19 @ 07:00  --------------------------------------------------------  IN: 3167.8 mL / OUT: 2295 mL / NET: 872.8 mL    07-13-19 @ 07:01  -  07-14-19 @ 03:01  --------------------------------------------------------  IN: 2377.3 mL / OUT: 1935 mL / NET: 442.3 mL      General: alert and oriented, NAD. NG tube in place.  Central line secured in place  Resp: airway patent, respirations unlabored  CVS: regular rate and rhythm  Abdomen: soft, nontender, nondistended ostomy pink and viable with empty bag, wound vac in place with good suction over midline incision, JPx2 in place with serous output, one in upper abdomen and the other in the pelvis. Ching draining yellow urine  Extremities: no edema  Skin: warm, dry, appropriate color                          7.4    18.1  )-----------( 251      ( 14 Jul 2019 02:02 )             21.9   07-14    137  |  104  |  46<H>  ----------------------------<  175<H>  5.1   |  22  |  0.64    Ca    8.1<L>      14 Jul 2019 00:33  Phos  3.2     07-14  Mg     1.8     07-14

## 2019-07-15 DIAGNOSIS — R40.0 SOMNOLENCE: ICD-10-CM

## 2019-07-15 LAB
ANION GAP SERPL CALC-SCNC: 10 MMOL/L — SIGNIFICANT CHANGE UP (ref 5–17)
BUN SERPL-MCNC: 34 MG/DL — HIGH (ref 7–23)
CALCIUM SERPL-MCNC: 8.2 MG/DL — LOW (ref 8.4–10.5)
CHLORIDE SERPL-SCNC: 105 MMOL/L — SIGNIFICANT CHANGE UP (ref 96–108)
CO2 SERPL-SCNC: 24 MMOL/L — SIGNIFICANT CHANGE UP (ref 22–31)
CREAT SERPL-MCNC: 0.57 MG/DL — SIGNIFICANT CHANGE UP (ref 0.5–1.3)
CULTURE RESULTS: SIGNIFICANT CHANGE UP
CULTURE RESULTS: SIGNIFICANT CHANGE UP
GLUCOSE BLDC GLUCOMTR-MCNC: 131 MG/DL — HIGH (ref 70–99)
GLUCOSE BLDC GLUCOMTR-MCNC: 132 MG/DL — HIGH (ref 70–99)
GLUCOSE BLDC GLUCOMTR-MCNC: 141 MG/DL — HIGH (ref 70–99)
GLUCOSE BLDC GLUCOMTR-MCNC: 152 MG/DL — HIGH (ref 70–99)
GLUCOSE SERPL-MCNC: 133 MG/DL — HIGH (ref 70–99)
HCT VFR BLD CALC: 22.5 % — LOW (ref 34.5–45)
HGB BLD-MCNC: 7.3 G/DL — LOW (ref 11.5–15.5)
MAGNESIUM SERPL-MCNC: 1.7 MG/DL — SIGNIFICANT CHANGE UP (ref 1.6–2.6)
MCHC RBC-ENTMCNC: 32.4 PG — SIGNIFICANT CHANGE UP (ref 27–34)
MCHC RBC-ENTMCNC: 32.6 GM/DL — SIGNIFICANT CHANGE UP (ref 32–36)
MCV RBC AUTO: 99.6 FL — SIGNIFICANT CHANGE UP (ref 80–100)
PHOSPHATE SERPL-MCNC: 3.3 MG/DL — SIGNIFICANT CHANGE UP (ref 2.5–4.5)
PLATELET # BLD AUTO: 346 K/UL — SIGNIFICANT CHANGE UP (ref 150–400)
POTASSIUM SERPL-MCNC: 4.4 MMOL/L — SIGNIFICANT CHANGE UP (ref 3.5–5.3)
POTASSIUM SERPL-SCNC: 4.4 MMOL/L — SIGNIFICANT CHANGE UP (ref 3.5–5.3)
RBC # BLD: 2.26 M/UL — LOW (ref 3.8–5.2)
RBC # FLD: 20.5 % — HIGH (ref 10.3–14.5)
SODIUM SERPL-SCNC: 139 MMOL/L — SIGNIFICANT CHANGE UP (ref 135–145)
SPECIMEN SOURCE: SIGNIFICANT CHANGE UP
SPECIMEN SOURCE: SIGNIFICANT CHANGE UP
WBC # BLD: 16.5 K/UL — HIGH (ref 3.8–10.5)
WBC # FLD AUTO: 16.5 K/UL — HIGH (ref 3.8–10.5)

## 2019-07-15 PROCEDURE — 99232 SBSQ HOSP IP/OBS MODERATE 35: CPT

## 2019-07-15 PROCEDURE — 99233 SBSQ HOSP IP/OBS HIGH 50: CPT

## 2019-07-15 RX ORDER — FUROSEMIDE 40 MG
20 TABLET ORAL DAILY
Refills: 0 | Status: DISCONTINUED | OUTPATIENT
Start: 2019-07-15 | End: 2019-07-20

## 2019-07-15 RX ORDER — I.V. FAT EMULSION 20 G/100ML
16.7 EMULSION INTRAVENOUS
Qty: 40 | Refills: 0 | Status: DISCONTINUED | OUTPATIENT
Start: 2019-07-15 | End: 2019-07-16

## 2019-07-15 RX ORDER — MAGNESIUM SULFATE 500 MG/ML
2 VIAL (ML) INJECTION ONCE
Refills: 0 | Status: COMPLETED | OUTPATIENT
Start: 2019-07-15 | End: 2019-07-15

## 2019-07-15 RX ORDER — ELECTROLYTE SOLUTION,INJ
1 VIAL (ML) INTRAVENOUS
Refills: 0 | Status: DISCONTINUED | OUTPATIENT
Start: 2019-07-15 | End: 2019-07-15

## 2019-07-15 RX ORDER — ENOXAPARIN SODIUM 100 MG/ML
85 INJECTION SUBCUTANEOUS
Refills: 0 | Status: DISCONTINUED | OUTPATIENT
Start: 2019-07-15 | End: 2019-07-22

## 2019-07-15 RX ADMIN — Medication 2.5 MILLIGRAM(S): at 18:32

## 2019-07-15 RX ADMIN — Medication 2.5 MILLIGRAM(S): at 07:16

## 2019-07-15 RX ADMIN — Medication 0.5 MILLIGRAM(S): at 18:37

## 2019-07-15 RX ADMIN — Medication 0.5 MILLIGRAM(S): at 06:14

## 2019-07-15 RX ADMIN — IMIPENEM AND CILASTATIN 100 MILLIGRAM(S): 250; 250 INJECTION, POWDER, FOR SOLUTION INTRAVENOUS at 06:13

## 2019-07-15 RX ADMIN — IMIPENEM AND CILASTATIN 100 MILLIGRAM(S): 250; 250 INJECTION, POWDER, FOR SOLUTION INTRAVENOUS at 18:30

## 2019-07-15 RX ADMIN — ENOXAPARIN SODIUM 85 MILLIGRAM(S): 100 INJECTION SUBCUTANEOUS at 18:31

## 2019-07-15 RX ADMIN — IMIPENEM AND CILASTATIN 100 MILLIGRAM(S): 250; 250 INJECTION, POWDER, FOR SOLUTION INTRAVENOUS at 00:35

## 2019-07-15 RX ADMIN — Medication 3 MILLILITER(S): at 18:31

## 2019-07-15 RX ADMIN — Medication 3 MILLILITER(S): at 12:21

## 2019-07-15 RX ADMIN — IRON SUCROSE 110 MILLIGRAM(S): 20 INJECTION, SOLUTION INTRAVENOUS at 22:29

## 2019-07-15 RX ADMIN — Medication 20 MILLIGRAM(S): at 18:32

## 2019-07-15 RX ADMIN — Medication 3 MILLILITER(S): at 06:16

## 2019-07-15 RX ADMIN — Medication 1 EACH: at 18:15

## 2019-07-15 RX ADMIN — Medication 2: at 00:35

## 2019-07-15 RX ADMIN — IMIPENEM AND CILASTATIN 100 MILLIGRAM(S): 250; 250 INJECTION, POWDER, FOR SOLUTION INTRAVENOUS at 12:20

## 2019-07-15 RX ADMIN — Medication 2.5 MILLIGRAM(S): at 00:34

## 2019-07-15 RX ADMIN — Medication 50 GRAM(S): at 13:56

## 2019-07-15 RX ADMIN — Medication 3 MILLILITER(S): at 00:35

## 2019-07-15 RX ADMIN — CHLORHEXIDINE GLUCONATE 1 APPLICATION(S): 213 SOLUTION TOPICAL at 12:23

## 2019-07-15 RX ADMIN — PANTOPRAZOLE SODIUM 40 MILLIGRAM(S): 20 TABLET, DELAYED RELEASE ORAL at 06:13

## 2019-07-15 RX ADMIN — I.V. FAT EMULSION 16.7 ML/HR: 20 EMULSION INTRAVENOUS at 18:15

## 2019-07-15 RX ADMIN — Medication 2.5 MILLIGRAM(S): at 13:56

## 2019-07-15 NOTE — PROGRESS NOTE ADULT - SUBJECTIVE AND OBJECTIVE BOX
Subjective:  - No output from ostomy  - OOB to chair with assistance today  - Notes mild abdominal pain. Denies SOB at rest, orthopnea, PND, CP, palpitations, lightheadedness, dizziness    Medications:  ALBUTerol/ipratropium for Nebulization 3 milliLiter(s) Nebulizer every 6 hours  benzocaine 15 mG/menthol 3.6 mG Lozenge 1 Lozenge Oral three times a day PRN  buDESOnide    Inhalation Suspension 0.5 milliGRAM(s) Inhalation every 12 hours  chlorhexidine 2% Cloths 1 Application(s) Topical daily  enalaprilat Injectable 2.5 milliGRAM(s) IV Push every 6 hours  enoxaparin Injectable 85 milliGRAM(s) SubCutaneous two times a day  fat emulsion (Fish Oil and Plant Based) 20% Infusion 16.7 mL/Hr IV Continuous <Continuous>  HYDROmorphone  Injectable 0.5 milliGRAM(s) IV Push every 3 hours PRN  imipenem/cilastatin  IVPB 500 milliGRAM(s) IV Intermittent every 6 hours  insulin lispro (HumaLOG) corrective regimen sliding scale   SubCutaneous every 6 hours  iron sucrose IVPB 200 milliGRAM(s) IV Intermittent every 24 hours  pantoprazole  Injectable 40 milliGRAM(s) IV Push every 24 hours  Parenteral Nutrition - Adult 1 Each TPN Continuous <Continuous>  Parenteral Nutrition - Adult 1 Each TPN Continuous <Continuous>      Physical Exam:    Vitals:  Vital Signs Last 24 Hours  T(C): 36.6 (07-15-19 @ 14:29), Max: 37.3 (07-15-19 @ 00:21)  HR: 106 (07-15-19 @ 14:29) (99 - 111)  BP: 111/68 (07-15-19 @ 14:29) (107/64 - 153/81)  RR: 18 (07-15-19 @ 14:29) (18 - 33)  SpO2: 100% (07-15-19 @ 14:29) (97% - 100%)        I&O's Summary    14 Jul 2019 07:01  -  15 Jul 2019 07:00  --------------------------------------------------------  IN: 2143.4 mL / OUT: 2245 mL / NET: -101.6 mL    15 Jul 2019 07:01  -  15 Jul 2019 14:38  --------------------------------------------------------  IN: 0 mL / OUT: 515 mL / NET: -515 mL    Tele: No tele    General: No distress. Comfortable.  HEENT: NGT to suction  Neck: Neck supple. JVP 6-8cm H2O. No masses  Chest: Diminished in bases. Occasional loose cough. Unlabored.   CV: RRR, Normal S1 and S2. No murmurs, rub, or gallops. Radial pulses normal. No LE edema  Abdomen: Obese, soft, mildly tender to palpation, midline woundvac in place, BASIM drain x 2. RUQ ostomy edematous, red, no output noted.   Skin: warm peripherally  Neurology: Lethargic, responding appropriately to questions. Sensation intact  Psych: Affect flat    Labs:                        7.3    16.5  )-----------( 346      ( 15 Jul 2019 08:55 )             22.5     07-15    139  |  105  |  34<H>  ----------------------------<  133<H>  4.4   |  24  |  0.57    Ca    8.2<L>      15 Jul 2019 07:09  Phos  3.3     07-15  Mg     1.7     07-15 Subjective:  - No output from ostomy  - OOB to chair with assistance today  - Notes mild abdominal pain. Denies SOB at rest, orthopnea, PND, CP, palpitations, lightheadedness, dizziness    Medications:  ALBUTerol/ipratropium for Nebulization 3 milliLiter(s) Nebulizer every 6 hours  benzocaine 15 mG/menthol 3.6 mG Lozenge 1 Lozenge Oral three times a day PRN  buDESOnide    Inhalation Suspension 0.5 milliGRAM(s) Inhalation every 12 hours  chlorhexidine 2% Cloths 1 Application(s) Topical daily  enalaprilat Injectable 2.5 milliGRAM(s) IV Push every 6 hours  enoxaparin Injectable 85 milliGRAM(s) SubCutaneous two times a day  fat emulsion (Fish Oil and Plant Based) 20% Infusion 16.7 mL/Hr IV Continuous <Continuous>  HYDROmorphone  Injectable 0.5 milliGRAM(s) IV Push every 3 hours PRN  imipenem/cilastatin  IVPB 500 milliGRAM(s) IV Intermittent every 6 hours  insulin lispro (HumaLOG) corrective regimen sliding scale   SubCutaneous every 6 hours  iron sucrose IVPB 200 milliGRAM(s) IV Intermittent every 24 hours  pantoprazole  Injectable 40 milliGRAM(s) IV Push every 24 hours  Parenteral Nutrition - Adult 1 Each TPN Continuous <Continuous>  Parenteral Nutrition - Adult 1 Each TPN Continuous <Continuous>      Physical Exam:    Vitals:  Vital Signs Last 24 Hours  T(C): 36.6 (07-15-19 @ 14:29), Max: 37.3 (07-15-19 @ 00:21)  HR: 106 (07-15-19 @ 14:29) (99 - 111)  BP: 111/68 (07-15-19 @ 14:29) (107/64 - 153/81)  RR: 18 (07-15-19 @ 14:29) (18 - 33)  SpO2: 100% (07-15-19 @ 14:29) (97% - 100%)        I&O's Summary    14 Jul 2019 07:01  -  15 Jul 2019 07:00  --------------------------------------------------------  IN: 2143.4 mL / OUT: 2245 mL / NET: -101.6 mL    15 Jul 2019 07:01  -  15 Jul 2019 14:38  --------------------------------------------------------  IN: 0 mL / OUT: 515 mL / NET: -515 mL    Tele: No tele    General: No distress. Comfortable.  HEENT: NGT to suction  Neck: Neck supple. JVP 6-8cm H2O. No masses  Chest: Diminished in bases. Occasional loose cough. Unlabored.   CV: RRR, Normal S1 and S2. No murmurs, rub, or gallops. Radial pulses normal. +2 LUE, +1 RUE, trace BLE edema  Abdomen: Obese, soft, mildly tender to palpation, midline woundvac in place, BASIM drain x 2. RUQ ostomy edematous, red, no output noted.   Skin: warm peripherally  Neurology: Lethargic, responding appropriately to questions with repeated verbal stimulation. Sensation intact  Psych: Affect flat    Labs:                        7.3    16.5  )-----------( 346      ( 15 Jul 2019 08:55 )             22.5     07-15    139  |  105  |  34<H>  ----------------------------<  133<H>  4.4   |  24  |  0.57    Ca    8.2<L>      15 Jul 2019 07:09  Phos  3.3     07-15  Mg     1.7     07-15

## 2019-07-15 NOTE — PROGRESS NOTE ADULT - SUBJECTIVE AND OBJECTIVE BOX
Interval Events: transferred to floor from SICU    S: Patient doing well, denies fevers, chills, nausea, emesis, chest pain, SOB. Pain is well controlled. No ostomy function.    O: Vital Signs  T(C): 36.8 (07-15 @ 01:50), Max: 37.3 (07-15 @ 00:21)  HR: 107 (07-15 @ 01:50) (91 - 111)  BP: 107/64 (07-15 @ 01:50) (105/55 - 153/81)  RR: 18 (07-15 @ 01:50) (18 - 37)  SpO2: 98% (07-15 @ 01:50) (95% - 100%)  07-13-19 @ 07:01  -  07-14-19 @ 07:00  --------------------------------------------------------  IN: 3059.1 mL / OUT: 2595 mL / NET: 464.1 mL    07-14-19 @ 07:01  -  07-15-19 @ 04:37  --------------------------------------------------------  IN: 947 mL / OUT: 1145 mL / NET: -198 mL      General: alert and oriented, NAD  HEENT: NC/AT, NGT secured in place  Resp: airway patent, respirations unlabored  CVS: regular rate and rhythm  Abdomen: soft, nontender, nondistended, ostomy pink with no stool or gas in bag, wound vac in place with good suction, upper BASIM w/serous output, lower BASIM with serosanguinous output  Extremities: + edema UE & LE b/l  Skin: warm, dry, appropriate color                          7.4    18.1  )-----------( 251      ( 14 Jul 2019 02:02 )             21.9   07-14    137  |  104  |  46<H>  ----------------------------<  175<H>  5.1   |  22  |  0.64    Ca    8.1<L>      14 Jul 2019 00:33  Phos  3.2     07-14  Mg     1.8     07-14

## 2019-07-15 NOTE — PROGRESS NOTE ADULT - ATTENDING COMMENTS
I have seen and evaluated the patient and discussed the relevant clinical findings and plan with the surgical housestaff and fellow.  Agree with the above documentation with addenda as noted.     Clinically stable, has some abdominal discomfort due to vac  Ileus  Abdomen soft, vac in place, drains serosanguinous  Ostomy edematous but viable, no gas or stool  Check prealbumin  Minimize blood draws as Hct has been drifting down (21.9)  Must work with PT to get out of bed and assist to chair  Chest PT  Cont abx as per ID    Clark Alvarado MD

## 2019-07-15 NOTE — PROGRESS NOTE ADULT - ATTENDING COMMENTS
NO new complaints  1.  Protein calorie malnutrition--optimized formulation for protein, calories.  Trend metabolic parameters  2.  Hyperkalemia--adjust TPN content  3.  HyperNa--volume, solute optimization

## 2019-07-15 NOTE — PROGRESS NOTE ADULT - SUBJECTIVE AND OBJECTIVE BOX
Follow Up:  Perforated Diverticulitis    Interval History: More pronounced cough today. Noting sore throat. No chills or fevers. Abdominal pain is stable     REVIEW OF SYSTEMS  [  ] ROS unobtainable because:    [  x] All other systems negative except as noted below:	    Constitutional:  [ ] fever [ ] chills  [ ] weight loss  [ ] weakness  Skin:  [ ] rash [ ] phlebitis	  Eyes: [ ] icterus [ ] pain  [ ] discharge	  ENMT: [x ] sore throat  [ ] thrush [ ] ulcers [ ] exudates  Respiratory: [ ] dyspnea [ ] hemoptysis [x ] cough [ ] sputum	  Cardiovascular:  [ ] chest pain [ ] palpitations [ ] edema	  Gastrointestinal:  [ ] nausea [ ] vomiting [ ] diarrhea [ ] constipation [ x] pain	  Genitourinary:  [ ] dysuria [ ] frequency [ ] hematuria [ ] discharge [ ] flank pain  [ ] incontinence  Musculoskeletal:  [ ] myalgias [ ] arthralgias [ ] arthritis  [ ] back pain  Neurological:  [ ] headache [ ] seizures  [ ] confusion/altered mental status  Psychiatric:  [ ] anxiety [ ] depression	  Hematology/Lymphatics:  [ ] lymphadenopathy  Endocrine:  [ ] adrenal [ ] thyroid  Allergic/Immunologic:	 [ ] transplant [ ] seasonal    Allergies  Coreg (Other)  digoxin (Other; Short breath (Mild to Mod))  Lopressor (Short breath)  penicillins (Hives)        ANTIMICROBIALS:  imipenem/cilastatin  IVPB 500 every 6 hours      OTHER MEDS:  MEDICATIONS  (STANDING):  ALBUTerol/ipratropium for Nebulization 3 every 6 hours  buDESOnide    Inhalation Suspension 0.5 every 12 hours  enalaprilat Injectable 2.5 every 6 hours  enoxaparin Injectable 85 two times a day  HYDROmorphone  Injectable 0.5 every 3 hours PRN  insulin lispro (HumaLOG) corrective regimen sliding scale  every 6 hours  pantoprazole  Injectable 40 every 24 hours      Vital Signs Last 24 Hrs  T(C): 37 (15 Jul 2019 09:54), Max: 37.3 (15 Jul 2019 00:21)  T(F): 98.6 (15 Jul 2019 09:54), Max: 99.1 (15 Jul 2019 00:21)  HR: 106 (15 Jul 2019 09:54) (93 - 111)  BP: 122/75 (15 Jul 2019 09:54) (107/64 - 153/81)  BP(mean): 78 (14 Jul 2019 15:00) (76 - 80)  RR: 18 (15 Jul 2019 09:54) (18 - 33)  SpO2: 100% (15 Jul 2019 09:54) (97% - 100%)    PHYSICAL EXAMINATION:  General: Alert and Awake, NAD  HEENT: PERRL, EOMI, No subconjunctival hemorrhages, Oropharynx Clear, MMM  Neck: Supple, No SARAHI  Cardiac: RRR, No M/R/G  Resp: CTAB, No Wh/Rh/Ra  Abdomen: Obese, +RLQ ostomy with minimal stool output. +midline wound vac with serosanguinous drainage, +2 drains on left side of abdomen draining serosanguinous material, NBS, ND, tender to palpation diffusely but worst in LLQ and LUQ, No HSM, No rigidity or guarding  MSK: No LE edema. No stigmata of IE. No evidence of phlebitis. No evidence of synovitis.  : + starr  Skin: No rashes or lesions. Skin is warm and dry to the touch.   Neuro: Alert and Awake. CN 2-12 Grossly intact. Moves all four extremities spontaneously.  Psych: Calm, Pleasant, Cooperative                          7.3    16.5  )-----------( 346      ( 15 Jul 2019 08:55 )             22.5       07-15    139  |  105  |  34<H>  ----------------------------<  133<H>  4.4   |  24  |  0.57    Ca    8.2<L>      15 Jul 2019 07:09  Phos  3.3     07-15  Mg     1.7     07-15            MICROBIOLOGY:  v  .Blood  07-10-19   No growth to date.  --  --      .Blood  07-10-19   No growth to date.  --  --      .Body Fluid abdominal fluid  07-09-19   Few Enterococcus faecalis  Few Coag Negative Staphylococcus  --  Enterococcus faecalis  Coag Negative Staphylococcus      .Body Fluid abdominal fluid  07-09-19   Few Enterococcus faecalis  Few Coag Negative Staphylococcus  --  Enterococcus faecalis  Coag Negative Staphylococcus      .Surgical Swab None Specimen Source:  abdomen, esw  07-02-19   Moderate Escherichia coli  Moderate Alpha hemolytic strep "Susceptibilities not performed"  Moderate Strep mitis oralis group "Susceptibilities not performed"  Growth in fluid media only Enterococcus faecalis  --  Escherichia coli  Enterococcus faecalis      .Blood  07-01-19   No growth at 5 days.  --  --      .Urine  07-01-19   No growth  --  --    RADIOLOGY:    EXAM:  XR CHEST PORTABLE ROUTINE 1V                        PROCEDURE DATE:  07/14/2019    Right-sided PICC line with tip overlying SVC.  A left-sided AICD is in situ.  NG tube in stomach - tip is off the film  The cardiomediastinal silhouette is within normal limits.  Moderate vascularity is normal.  The lungs are clear. No pleural effusion or pneumothorax.  No acute osseous finding.

## 2019-07-15 NOTE — PROGRESS NOTE ADULT - SUBJECTIVE AND OBJECTIVE BOX
[facility] NUTRITION SUPPORT / TPN -- FOLLOW UP NOTE  --------------------------------------------------------------------------------    24 hour events/subjective:          Diet:  Diet, NPO:   Except Medications (07-09-19 @ 14:09)      Appetite: [x  ]Poor [  ]Adequate [  ]Good  Caloric intake:  [ x  ]  Adequate   [   ] Inadequate    ROS: General/ GI see HPI  all other systems negative      ALLERGIES & MEDICATIONS  --------------------------------------------------------------------------------  ALLERGIES  Coreg (Other)  digoxin (Other; Short breath (Mild to Mod))  Lopressor (Short breath)  penicillins (Hives)    INTOLERANCES  metoprolol (Other)  Solu-Medrol (Other (Mild to Mod))    STANDING INPATIENT MEDICATIONS    ALBUTerol/ipratropium for Nebulization 3 milliLiter(s) Nebulizer every 6 hours  buDESOnide    Inhalation Suspension 0.5 milliGRAM(s) Inhalation every 12 hours  chlorhexidine 2% Cloths 1 Application(s) Topical daily  enalaprilat Injectable 2.5 milliGRAM(s) IV Push every 6 hours  enoxaparin Injectable 85 milliGRAM(s) SubCutaneous two times a day  fat emulsion (Fish Oil and Plant Based) 20% Infusion 16.7 mL/Hr IV Continuous <Continuous>  imipenem/cilastatin  IVPB 500 milliGRAM(s) IV Intermittent every 6 hours  insulin lispro (HumaLOG) corrective regimen sliding scale   SubCutaneous every 6 hours  iron sucrose IVPB 200 milliGRAM(s) IV Intermittent every 24 hours  pantoprazole  Injectable 40 milliGRAM(s) IV Push every 24 hours  Parenteral Nutrition - Adult 1 Each TPN Continuous <Continuous>      PRN INPATIENT MEDICATION  benzocaine 15 mG/menthol 3.6 mG Lozenge 1 Lozenge Oral three times a day PRN  HYDROmorphone  Injectable 0.5 milliGRAM(s) IV Push every 3 hours PRN        VITALS/PHYSICAL EXAM  --------------------------------------------------------------------------------  T(C): 36.8 (07-15-19 @ 05:52), Max: 37.3 (07-15-19 @ 00:21)  HR: 102 (07-15-19 @ 05:52) (91 - 111)  BP: 113/71 (07-15-19 @ 05:52) (107/64 - 153/81)  RR: 18 (07-15-19 @ 05:52) (18 - 37)  SpO2: 100% (07-15-19 @ 05:52) (95% - 100%)  Wt(kg): --        07-14-19 @ 07:01  -  07-15-19 @ 07:00  --------------------------------------------------------  IN: 2143.4 mL / OUT: 2245 mL / NET: -101.6 mL            LABS/ CULTURES/ RADIOLOGY:              7.4    18.1  >-----------<  251      [07-14-19 @ 02:02]              21.9     139  |  105  |  34  ----------------------------<  133      [07-15-19 @ 07:09]  4.4   |  24  |  0.57        Ca     8.2     [07-15-19 @ 07:09]      Mg     1.7     [07-15-19 @ 07:09]      Phos  3.3     [07-15-19 @ 07:09]    Blood Gas Calcium, Ionized - Venous: 1.26 mmoL/L (07.13.19 @ 02:05)    Triglycerides, Serum: 119 mg/dL (07.08.19 @ 04:24)    CAPILLARY BLOOD GLUCOSE  POCT Blood Glucose.: 131 mg/dL (15 Jul 2019 05:57)  POCT Blood Glucose.: 152 mg/dL (15 Jul 2019 00:06)  POCT Blood Glucose.: 150 mg/dL (14 Jul 2019 19:11)  POCT Blood Glucose.: 171 mg/dL (14 Jul 2019 14:08)  POCT Blood Glucose.: 196 mg/dL (14 Jul 2019 10:02)    Prealbumin, Serum: 5 mg/dL (07-04-19 @ 22:45) [facility] NUTRITION SUPPORT / TPN -- FOLLOW UP NOTE  --------------------------------------------------------------------------------    24 hour events/subjective:    no acute overnight events.   Pain controlled.   denies SOB/CP/palpitations  Remains NPO/NGT; no n/v/d no faltus  No f/c/s  pt has been transferred out of SICU and on surgical floor      Diet:  Diet, NPO:   Except Medications (07-09-19 @ 14:09)      Appetite: [x  ]Poor [  ]Adequate [  ]Good  Caloric intake:  [ x  ]  Adequate   [   ] Inadequate    ROS: General/ GI see HPI  all other systems negative      ALLERGIES & MEDICATIONS  --------------------------------------------------------------------------------  ALLERGIES  Coreg (Other)  digoxin (Other; Short breath (Mild to Mod))  Lopressor (Short breath)  penicillins (Hives)    INTOLERANCES  metoprolol (Other)  Solu-Medrol (Other (Mild to Mod))    STANDING INPATIENT MEDICATIONS    ALBUTerol/ipratropium for Nebulization 3 milliLiter(s) Nebulizer every 6 hours  buDESOnide    Inhalation Suspension 0.5 milliGRAM(s) Inhalation every 12 hours  chlorhexidine 2% Cloths 1 Application(s) Topical daily  enalaprilat Injectable 2.5 milliGRAM(s) IV Push every 6 hours  enoxaparin Injectable 85 milliGRAM(s) SubCutaneous two times a day  fat emulsion (Fish Oil and Plant Based) 20% Infusion 16.7 mL/Hr IV Continuous <Continuous>  imipenem/cilastatin  IVPB 500 milliGRAM(s) IV Intermittent every 6 hours  insulin lispro (HumaLOG) corrective regimen sliding scale   SubCutaneous every 6 hours  iron sucrose IVPB 200 milliGRAM(s) IV Intermittent every 24 hours  pantoprazole  Injectable 40 milliGRAM(s) IV Push every 24 hours  Parenteral Nutrition - Adult 1 Each TPN Continuous <Continuous>      PRN INPATIENT MEDICATION  benzocaine 15 mG/menthol 3.6 mG Lozenge 1 Lozenge Oral three times a day PRN  HYDROmorphone  Injectable 0.5 milliGRAM(s) IV Push every 3 hours PRN        VITALS/PHYSICAL EXAM  --------------------------------------------------------------------------------  T(C): 36.8 (07-15-19 @ 05:52), Max: 37.3 (07-15-19 @ 00:21)  HR: 102 (07-15-19 @ 05:52) (91 - 111)  BP: 113/71 (07-15-19 @ 05:52) (107/64 - 153/81)  RR: 18 (07-15-19 @ 05:52) (18 - 37)  SpO2: 100% (07-15-19 @ 05:52) (95% - 100%)  Wt(kg): --        07-14-19 @ 07:01  -  07-15-19 @ 07:00  --------------------------------------------------------  IN: 2143.4 mL / OUT: 2245 mL / NET: -101.6 mL      Physical Exam:    	Gen: NAD, NGT in place, sleepy but arousable  	HEENT: NC/AT, PERRL, mucosa moist, supple neck, clear oropharynx  	Chest: clear  	GI: +softly distended, nontender, no BS                  (+)Ostomy pink & viable.  No gas or stool                  VAC intact w/ good seal              :  (+)Ching              MSK/Vascular: Passive ROM x4,  no clubbing, cyanosis, nor edema                   RUE PICC dressing C/D/I w/o SOI  	Neuro: Awake and alert  	Skin: Warm, good turgor, without rashes                   LABS/ CULTURES/ RADIOLOGY:              7.4    18.1  >-----------<  251      [07-14-19 @ 02:02]              21.9     139  |  105  |  34  ----------------------------<  133      [07-15-19 @ 07:09]  4.4   |  24  |  0.57        Ca     8.2     [07-15-19 @ 07:09]      Mg     1.7     [07-15-19 @ 07:09]      Phos  3.3     [07-15-19 @ 07:09]    Blood Gas Calcium, Ionized - Venous: 1.26 mmoL/L (07.13.19 @ 02:05)    Triglycerides, Serum: 119 mg/dL (07.08.19 @ 04:24)    CAPILLARY BLOOD GLUCOSE  POCT Blood Glucose.: 131 mg/dL (15 Jul 2019 05:57)  POCT Blood Glucose.: 152 mg/dL (15 Jul 2019 00:06)  POCT Blood Glucose.: 150 mg/dL (14 Jul 2019 19:11)  POCT Blood Glucose.: 171 mg/dL (14 Jul 2019 14:08)  POCT Blood Glucose.: 196 mg/dL (14 Jul 2019 10:02)    Prealbumin, Serum: 5 mg/dL (07-04-19 @ 22:45)

## 2019-07-15 NOTE — PROGRESS NOTE ADULT - ASSESSMENT
Ms. Dolan is a  74yof with perforated diverticulitis s/p exploratory laparotomy, left hemicolectomy, end colostomy, and ABThera Vac placement, s/p 3x RTOR for abdominal washout and abthera vac placement, abdomen closed with Strattice, 2 garcia drains placed (upper abdomen and pelvis), black vac placed over mesh.    Yazidi -- no blood products, use pediatric vials for labs  Likely will need a skin graft over mesh once granulated in 2-4 weeks, will discuss with Plastics  - Pain control: dilaudid/tylenol  - Vasotec  - Iron sucrose  - Duonebs, pulmicort  - NPO, NGT, TPN  - ISS  - IV Abx: Imipenem/Cilastatin  F/u cardiology recs  F/u intraabdominal fluid cultures    Green [id number].

## 2019-07-15 NOTE — PROGRESS NOTE ADULT - ASSESSMENT
A/P:74F admitted with abdominal pain and exam findings consistent with peritonitis secondary to acute diverticulitis at the splenic flexure with evidence of contained perforation now s/p Exploratory laparotomy, left hemicolectomy, end colostomy, and ABThera Vac placement and s/p from RTOR for abdominal washout and Abthera vac placement, off pressors. TPN started 7/5 for Protein-Calorie Malnutrition    - TPN formula @ goal: Carbohydrates: 210grams, Amino Acid: 120grams, 40g lipids in 2000 ml  - Strict Intake and Output.,   - Weights three times a week  - Monitor BMP, Mg, Ionized Ca, Phosphorus daily  - Pre-albumin weekly.  - Hyperglycemia- will increase insulin from 55U to 60U today.  Continue sliding scale coverage.   - Hyperkalemia - KCl decreased from 100 meq to 60 meq today.  - HyperNa-stabilized;  No NaAce or NaCl in TPN  - Continue as per Surgery d/w team, will follow with you        TPN team, pager [phone], spectra [phone]  D/w Adela Cross

## 2019-07-15 NOTE — PROGRESS NOTE ADULT - PROBLEM SELECTOR PLAN 2
- Management as per surgical team  - Continue IV abx; cx negative  - Continue TPN given low albumin and inability to feed patient enterally due to ileus

## 2019-07-15 NOTE — PROGRESS NOTE ADULT - PROBLEM SELECTOR PLAN 3
- Continue current dose of enalaprilat while NPO  - Recommend lasix 20mg IV daily to keep net even  - No BB due to asthma and previous intolerance  - Daily standing weights if able. Strict I/Os  - Maintain Mag 2-2.5, K 4-4.5 - Continue current dose of enalaprilat while NPO  - Recommend lasix 20mg IV PRN to keep net even  - No BB due to asthma and previous intolerance  - Daily standing weights if able. Strict I/Os  - Maintain Mag 2-2.5, K 4-4.5 - Continue current dose of enalaprilat while NPO  - Recommend lasix 20mg IV PRN to keep net even  - No beta blockers due to severe bronchospastic disease/asthma and known previous intolerance  - Daily standing weights if able. Strict I/Os  - Maintain Mag 2-2.5, K 4-4.5

## 2019-07-15 NOTE — PROGRESS NOTE ADULT - ASSESSMENT
75 yo F with PMHx of HFrEF (EF 35% now improved to 45%) s/p CRT-D, asthma (not on BB d/t severity), HTN, HLD, atrial fibrillation on eliquis, who p/w severe, sudden onset L sided abd pain, found to have proximal descending colon/splenic flexure acute diverticulitis with evidence of contained perforation, now s/p exploratory laparotomy, left hemicolectomy, end colostomy, and ABThera VAC placement on 7/1. Transferred back to the SICU immediately postop requiring high doses of pressors, now off and being treated with broad spectrum abx. S/P abdominal washout x3 (last 7/9). Returned to OR 7/11 for abdominal closure. Vac placed. She was extubated 7/12 AM to BiPAP. Currently appears close to euvolemic. She is generally normotenisve tolerating IV enalaprilat. She is net even, receiving intermittent dosing of IV lasix. 73 yo F with PMHx of HFrEF (EF 35% now improved to 45%) s/p CRT-D, asthma (not on BB d/t severity), HTN, HLD, atrial fibrillation on eliquis, who p/w severe, sudden onset L sided abd pain, found to have proximal descending colon/splenic flexure acute diverticulitis with evidence of contained perforation, now s/p exploratory laparotomy, left hemicolectomy, end colostomy, and ABThera VAC placement on 7/1. Transferred back to the SICU immediately postop requiring high doses of pressors, now off and being treated with broad spectrum abx. S/P abdominal washout x3 (last 7/9). Returned to OR 7/11 for abdominal closure. Vac placed. She was extubated 7/12 AM to BiPAP. Currently does not appear to have evidence of elevated filling pressures. Peripheral edema is likely related to low albumin of 1.7.  She is generally normotenisve tolerating IV enalaprilat. She is net even, receiving intermittent dosing of IV lasix. 73 yo F with PMHx of HFrEF (EF 35% now improved to 45%) s/p CRT-D, asthma (not on BB d/t severity), HTN, HLD, atrial fibrillation on eliquis, who p/w severe, sudden onset L sided abd pain, found to have proximal descending colon/splenic flexure acute diverticulitis with evidence of contained perforation, now s/p exploratory laparotomy, left hemicolectomy, end colostomy, and ABThera VAC placement on 7/1. Transferred back to the SICU immediately postop requiring high doses of pressors, now off and being treated with broad spectrum abx. S/P abdominal washout x3 (last 7/9). Returned to OR 7/11 for abdominal closure. Vac placed. She was extubated 7/12 AM to BiPAP. Currently does not appear to have evidence of elevated filling pressures. Peripheral edema is likely related to low albumin of 1.7.  She is generally normotensive tolerating IV enalaprilat. She is net even, receiving intermittent dosing of IV lasix.

## 2019-07-15 NOTE — PROGRESS NOTE ADULT - ATTENDING COMMENTS
More somnolent than described earlier. Vital signs reviewed and stable.  Agree with plan of care as outlined above.

## 2019-07-15 NOTE — PROGRESS NOTE ADULT - ASSESSMENT
74 year old female PMHx of HFrEF s/p AICD, asthma, HTN, HLD, atrial fibrillation on eliquis who presented to [facility] on 6/30 with diverticulitis at splenic flexure with perforation on repeat scan on 7/1.  On 7/2 patient underwent Exploratory laparotomy, partial colectomy, end colostomy, and placement of temporary abdominal VAC dressing. Intraoperatively was noted of having perforation of distal transverse colon with feculent peritonitis. On 7/3 patient underwent Reopening of abdominal wound and an abdominal washout. On 7/9 patient RTOR for Abthera Vac placement and was found to have viable Edematous bowel and minimal pus-like fluid in pelvis which was sent for culture. On 7/10 hypotensive to SBP in the 80's and broadened to Imipenem. Wound cultures from 7/2/19 with E. coli, AHS, Strep mitis and Enterococcus faecalis (amp susceptible).    Ciprofloxacin 6/30 - 7/1  Aztreonam 7/2 - 7/4  Flagyl 6/30 - 7/9  Ceftriaxone 7/4 - 7/9  Imipenem 7/10 -->>    Overall, feculent peritonitis and resolved septic shock secondary to perforated diverticulitis. 7/9 surgical cultures with enterococcus. Imipenem offers coverage for ampicillin susceptible enterococcus   CoNS noted on 7/9 surgical cultures - suspect it is possible contaminant  7/10 blood cultures negative  7/11 RTOR with no evidence of purulence/infection noted  Would continue current management    #Perforated Diverticulitis  --Continue imipenem 500 mg IV Q6H    #Cough/Sore throat (likely related to viral process - CXR from 7/14 clear)  --Recommend RVP     #Leukocytosis (likely related to recent surgery - will need to monitor)  --Continue to trend 74 year old female PMHx of HFrEF s/p AICD, asthma, HTN, HLD, atrial fibrillation on eliquis who presented to [facility] on 6/30 with diverticulitis at splenic flexure with perforation on repeat scan on 7/1.  On 7/2 patient underwent Exploratory laparotomy, partial colectomy, end colostomy, and placement of temporary abdominal VAC dressing. Intraoperatively was noted of having perforation of distal transverse colon with feculent peritonitis. On 7/3 patient underwent Reopening of abdominal wound and an abdominal washout. On 7/9 patient RTOR for Abthera Vac placement and was found to have viable Edematous bowel and minimal pus-like fluid in pelvis which was sent for culture. On 7/10 hypotensive to SBP in the 80's and broadened to Imipenem. Wound cultures from 7/2/19 with E. coli, AHS, Strep mitis and Enterococcus faecalis (amp susceptible).    Ciprofloxacin 6/30 - 7/1  Aztreonam 7/2 - 7/4  Flagyl 6/30 - 7/9  Ceftriaxone 7/4 - 7/9  Imipenem 7/10 -->>    Overall, feculent peritonitis and resolved septic shock secondary to perforated diverticulitis. 7/9 surgical cultures with enterococcus. Imipenem offers coverage for ampicillin susceptible enterococcus   CoNS noted on 7/9 surgical cultures - suspect it is possible contaminant  7/10 blood cultures negative  7/11 RTOR with no evidence of purulence/infection noted  Would continue current management    #Perforated Diverticulitis  --Continue imipenem 500 mg IV Q6H    #Cough/Sore throat (likely related to viral process - CXR from 7/14 clear)  --Recommend RVP     #Leukocytosis (likely related to recent surgery - will need to monitor)  --Continue to trend    I will continue to follow. Please feel free to contact me with any further questions.    Rex Goode M.D.  [facility] Division of Infectious Disease  8AM-5PM: Pager Number [phone]  After Hours: Please contact the Infectious Diseases Office at [phone]

## 2019-07-16 LAB
ALBUMIN SERPL ELPH-MCNC: 2.3 G/DL — LOW (ref 3.3–5)
ALP SERPL-CCNC: 149 U/L — HIGH (ref 40–120)
ALT FLD-CCNC: 11 U/L — SIGNIFICANT CHANGE UP (ref 10–45)
ANION GAP SERPL CALC-SCNC: 10 MMOL/L — SIGNIFICANT CHANGE UP (ref 5–17)
ANION GAP SERPL CALC-SCNC: 12 MMOL/L — SIGNIFICANT CHANGE UP (ref 5–17)
AST SERPL-CCNC: 19 U/L — SIGNIFICANT CHANGE UP (ref 10–40)
BILIRUB DIRECT SERPL-MCNC: 0.2 MG/DL — SIGNIFICANT CHANGE UP (ref 0–0.2)
BILIRUB INDIRECT FLD-MCNC: 0.2 MG/DL — SIGNIFICANT CHANGE UP (ref 0.2–1)
BILIRUB SERPL-MCNC: 0.4 MG/DL — SIGNIFICANT CHANGE UP (ref 0.2–1.2)
BUN SERPL-MCNC: 30 MG/DL — HIGH (ref 7–23)
BUN SERPL-MCNC: 31 MG/DL — HIGH (ref 7–23)
CA-I BLD-SCNC: 0.8 MMOL/L — LOW (ref 1.12–1.3)
CALCIUM SERPL-MCNC: 7.6 MG/DL — LOW (ref 8.4–10.5)
CALCIUM SERPL-MCNC: 8.6 MG/DL — SIGNIFICANT CHANGE UP (ref 8.4–10.5)
CHLORIDE SERPL-SCNC: 104 MMOL/L — SIGNIFICANT CHANGE UP (ref 96–108)
CHLORIDE SERPL-SCNC: 97 MMOL/L — SIGNIFICANT CHANGE UP (ref 96–108)
CO2 SERPL-SCNC: 11 MMOL/L — LOW (ref 22–31)
CO2 SERPL-SCNC: 26 MMOL/L — SIGNIFICANT CHANGE UP (ref 22–31)
CREAT SERPL-MCNC: 0.62 MG/DL — SIGNIFICANT CHANGE UP (ref 0.5–1.3)
CREAT SERPL-MCNC: <0.3 MG/DL — LOW (ref 0.5–1.3)
FERRITIN SERPL-MCNC: 1390 NG/ML — HIGH (ref 15–150)
FOLATE SERPL-MCNC: 12.7 NG/ML — SIGNIFICANT CHANGE UP
GAS PNL BLDA: SIGNIFICANT CHANGE UP
GLUCOSE BLDC GLUCOMTR-MCNC: 119 MG/DL — HIGH (ref 70–99)
GLUCOSE BLDC GLUCOMTR-MCNC: 123 MG/DL — HIGH (ref 70–99)
GLUCOSE BLDC GLUCOMTR-MCNC: 148 MG/DL — HIGH (ref 70–99)
GLUCOSE BLDC GLUCOMTR-MCNC: 149 MG/DL — HIGH (ref 70–99)
GLUCOSE SERPL-MCNC: 125 MG/DL — HIGH (ref 70–99)
GLUCOSE SERPL-MCNC: 151 MG/DL — HIGH (ref 70–99)
HCT VFR BLD CALC: 21.3 % — LOW (ref 34.5–45)
HGB BLD-MCNC: 7.3 G/DL — LOW (ref 11.5–15.5)
IRON SATN MFR SERPL: 29 % — SIGNIFICANT CHANGE UP (ref 14–50)
IRON SATN MFR SERPL: 62 UG/DL — SIGNIFICANT CHANGE UP (ref 30–160)
LDH SERPL L TO P-CCNC: >2250 U/L — HIGH (ref 50–242)
MAGNESIUM SERPL-MCNC: 2.4 MG/DL — SIGNIFICANT CHANGE UP (ref 1.6–2.6)
MCHC RBC-ENTMCNC: 33.5 PG — SIGNIFICANT CHANGE UP (ref 27–34)
MCHC RBC-ENTMCNC: 34.4 GM/DL — SIGNIFICANT CHANGE UP (ref 32–36)
MCV RBC AUTO: 97.4 FL — SIGNIFICANT CHANGE UP (ref 80–100)
PHOSPHATE SERPL-MCNC: 6.5 MG/DL — HIGH (ref 2.5–4.5)
PLATELET # BLD AUTO: 279 K/UL — SIGNIFICANT CHANGE UP (ref 150–400)
POTASSIUM SERPL-MCNC: 3.8 MMOL/L — SIGNIFICANT CHANGE UP (ref 3.5–5.3)
POTASSIUM SERPL-MCNC: >9 MMOL/L — CRITICAL HIGH (ref 3.5–5.3)
POTASSIUM SERPL-SCNC: 3.8 MMOL/L — SIGNIFICANT CHANGE UP (ref 3.5–5.3)
POTASSIUM SERPL-SCNC: >9 MMOL/L — CRITICAL HIGH (ref 3.5–5.3)
PROT SERPL-MCNC: 6.4 G/DL — SIGNIFICANT CHANGE UP (ref 6–8.3)
RAPID RVP RESULT: SIGNIFICANT CHANGE UP
RBC # BLD: 2.19 M/UL — LOW (ref 3.8–5.2)
RBC # FLD: 32 % — HIGH (ref 10.3–14.5)
SODIUM SERPL-SCNC: 120 MMOL/L — CRITICAL LOW (ref 135–145)
SODIUM SERPL-SCNC: 140 MMOL/L — SIGNIFICANT CHANGE UP (ref 135–145)
TIBC SERPL-MCNC: 211 UG/DL — LOW (ref 220–430)
TRANSFERRIN SERPL-MCNC: 126 MG/DL — LOW (ref 200–360)
TRIGL SERPL-MCNC: 175 MG/DL — HIGH (ref 10–149)
TROPONIN T, HIGH SENSITIVITY RESULT: 34 NG/L — SIGNIFICANT CHANGE UP (ref 0–51)
UIBC SERPL-MCNC: 149 UG/DL — SIGNIFICANT CHANGE UP (ref 110–370)
VIT B12 SERPL-MCNC: >2000 PG/ML — HIGH (ref 232–1245)
WBC # BLD: 14.4 K/UL — HIGH (ref 3.8–10.5)
WBC # FLD AUTO: 14.4 K/UL — HIGH (ref 3.8–10.5)

## 2019-07-16 PROCEDURE — 99233 SBSQ HOSP IP/OBS HIGH 50: CPT

## 2019-07-16 PROCEDURE — 99222 1ST HOSP IP/OBS MODERATE 55: CPT | Mod: GC

## 2019-07-16 PROCEDURE — 99232 SBSQ HOSP IP/OBS MODERATE 35: CPT

## 2019-07-16 PROCEDURE — 71275 CT ANGIOGRAPHY CHEST: CPT | Mod: 26

## 2019-07-16 PROCEDURE — 71045 X-RAY EXAM CHEST 1 VIEW: CPT | Mod: 26

## 2019-07-16 PROCEDURE — 93010 ELECTROCARDIOGRAM REPORT: CPT

## 2019-07-16 PROCEDURE — 74177 CT ABD & PELVIS W/CONTRAST: CPT | Mod: 26

## 2019-07-16 PROCEDURE — 99223 1ST HOSP IP/OBS HIGH 75: CPT | Mod: GC

## 2019-07-16 RX ORDER — ONDANSETRON 8 MG/1
4 TABLET, FILM COATED ORAL ONCE
Refills: 0 | Status: COMPLETED | OUTPATIENT
Start: 2019-07-16 | End: 2019-07-16

## 2019-07-16 RX ORDER — POTASSIUM CHLORIDE 20 MEQ
10 PACKET (EA) ORAL
Refills: 0 | Status: COMPLETED | OUTPATIENT
Start: 2019-07-16 | End: 2019-07-16

## 2019-07-16 RX ORDER — I.V. FAT EMULSION 20 G/100ML
20.8 EMULSION INTRAVENOUS
Qty: 50 | Refills: 0 | Status: DISCONTINUED | OUTPATIENT
Start: 2019-07-16 | End: 2019-07-17

## 2019-07-16 RX ORDER — TETRACAINE/BENZOCAINE/BUTAMBEN 2%-14%-2%
1 OINTMENT (GRAM) TOPICAL ONCE
Refills: 0 | Status: COMPLETED | OUTPATIENT
Start: 2019-07-16 | End: 2019-07-16

## 2019-07-16 RX ORDER — CALCIUM GLUCONATE 100 MG/ML
2 VIAL (ML) INTRAVENOUS ONCE
Refills: 0 | Status: COMPLETED | OUTPATIENT
Start: 2019-07-16 | End: 2019-07-16

## 2019-07-16 RX ORDER — ELECTROLYTE SOLUTION,INJ
1 VIAL (ML) INTRAVENOUS
Refills: 0 | Status: DISCONTINUED | OUTPATIENT
Start: 2019-07-16 | End: 2019-07-16

## 2019-07-16 RX ORDER — ERYTHROPOIETIN 10000 [IU]/ML
20000 INJECTION, SOLUTION INTRAVENOUS; SUBCUTANEOUS
Refills: 0 | Status: DISCONTINUED | OUTPATIENT
Start: 2019-07-16 | End: 2019-07-16

## 2019-07-16 RX ADMIN — Medication 3 MILLILITER(S): at 23:24

## 2019-07-16 RX ADMIN — IMIPENEM AND CILASTATIN 100 MILLIGRAM(S): 250; 250 INJECTION, POWDER, FOR SOLUTION INTRAVENOUS at 19:57

## 2019-07-16 RX ADMIN — Medication 0: at 05:36

## 2019-07-16 RX ADMIN — Medication 200 GRAM(S): at 18:34

## 2019-07-16 RX ADMIN — Medication 2.5 MILLIGRAM(S): at 13:20

## 2019-07-16 RX ADMIN — Medication 2.5 MILLIGRAM(S): at 23:58

## 2019-07-16 RX ADMIN — IMIPENEM AND CILASTATIN 100 MILLIGRAM(S): 250; 250 INJECTION, POWDER, FOR SOLUTION INTRAVENOUS at 00:29

## 2019-07-16 RX ADMIN — PANTOPRAZOLE SODIUM 40 MILLIGRAM(S): 20 TABLET, DELAYED RELEASE ORAL at 05:38

## 2019-07-16 RX ADMIN — ENOXAPARIN SODIUM 85 MILLIGRAM(S): 100 INJECTION SUBCUTANEOUS at 05:41

## 2019-07-16 RX ADMIN — Medication 0.5 MILLIGRAM(S): at 05:38

## 2019-07-16 RX ADMIN — Medication 0.5 MILLIGRAM(S): at 17:44

## 2019-07-16 RX ADMIN — Medication 3 MILLILITER(S): at 00:30

## 2019-07-16 RX ADMIN — Medication 100 MILLIEQUIVALENT(S): at 10:27

## 2019-07-16 RX ADMIN — Medication 3 MILLILITER(S): at 12:46

## 2019-07-16 RX ADMIN — Medication 0: at 00:31

## 2019-07-16 RX ADMIN — Medication 100 MILLIEQUIVALENT(S): at 11:56

## 2019-07-16 RX ADMIN — Medication 100 MILLIEQUIVALENT(S): at 12:58

## 2019-07-16 RX ADMIN — Medication 2.5 MILLIGRAM(S): at 05:36

## 2019-07-16 RX ADMIN — Medication 2.5 MILLIGRAM(S): at 18:41

## 2019-07-16 RX ADMIN — Medication 200 GRAM(S): at 12:58

## 2019-07-16 RX ADMIN — Medication 3 MILLILITER(S): at 17:44

## 2019-07-16 RX ADMIN — IRON SUCROSE 110 MILLIGRAM(S): 20 INJECTION, SOLUTION INTRAVENOUS at 21:41

## 2019-07-16 RX ADMIN — IMIPENEM AND CILASTATIN 100 MILLIGRAM(S): 250; 250 INJECTION, POWDER, FOR SOLUTION INTRAVENOUS at 13:02

## 2019-07-16 RX ADMIN — IMIPENEM AND CILASTATIN 100 MILLIGRAM(S): 250; 250 INJECTION, POWDER, FOR SOLUTION INTRAVENOUS at 05:36

## 2019-07-16 RX ADMIN — Medication 1 EACH: at 18:56

## 2019-07-16 RX ADMIN — Medication 20 MILLIGRAM(S): at 05:38

## 2019-07-16 RX ADMIN — Medication 1 SPRAY(S): at 02:30

## 2019-07-16 RX ADMIN — ENOXAPARIN SODIUM 85 MILLIGRAM(S): 100 INJECTION SUBCUTANEOUS at 18:41

## 2019-07-16 RX ADMIN — Medication 3 MILLILITER(S): at 05:35

## 2019-07-16 RX ADMIN — I.V. FAT EMULSION 20.8 ML/HR: 20 EMULSION INTRAVENOUS at 18:56

## 2019-07-16 RX ADMIN — ONDANSETRON 4 MILLIGRAM(S): 8 TABLET, FILM COATED ORAL at 13:10

## 2019-07-16 RX ADMIN — Medication 2.5 MILLIGRAM(S): at 00:32

## 2019-07-16 NOTE — PROGRESS NOTE ADULT - ASSESSMENT
Ms. Dolan is a  74yof with perforated diverticulitis s/p exploratory laparotomy, left hemicolectomy, end colostomy, and ABThera Vac placement, s/p 3x RTOR for abdominal washout and abthera vac placement, abdomen closed with Strattice, 2 garcia drains placed (upper abdomen and pelvis), black vac placed over mesh.    Anglican -- no blood products, use pediatric vials for labs  Likely will need a skin graft over mesh once granulated in 2-4 weeks, will discuss with Plastics  - Pain control: dilaudid/tylenol  - Continue enalapril  - Iron sucrose  - Duonebs, pulmicort - RVP+ contributing to respiratory issues  - NPO, NGT, TPN  - ISS  - IV Abx: Imipenem/Cilastatin    Appreciate cardiology recs  F/u intraabdominal fluid cultures: Enterococcus faecalis, Coagulase negative streptococcus (likely contaminant)    Cl [id number]. Ms. Dolan is a  74yof with perforated diverticulitis s/p exploratory laparotomy, left hemicolectomy, end colostomy, and ABThera Vac placement, s/p 3x RTOR for abdominal washout and abthera vac placement, abdomen closed with Strattice, 2 garcia drains placed (upper abdomen and pelvis), black vac placed over mesh.    Judaism -- no blood products, use pediatric vials for labs  Likely will need a skin graft over mesh once granulated in 2-4 weeks, will discuss with Plastics  - Pain control: dilaudid/tylenol  - Continue enalapril  - Iron sucrose  - Duonebs, pulmicort  - NPO, NGT, TPN  - ISS  - IV Abx: Imipenem/Cilastatin    Appreciate cardiology recs  F/u intraabdominal fluid cultures: Enterococcus faecalis, Coagulase negative streptococcus (likely contaminant)    Cl [id number].

## 2019-07-16 NOTE — CONSULT NOTE ADULT - ASSESSMENT
Patient is a 75 y/o F (Mu-ism) w/ HFrEF s/p CRT-D, Asthma, HTN, HLD, and A-fib on Eliquis who initially p/w severe, sudden onset L-sided abdominal pain and was  found to have a proximal descending colon/splenic flexure acute diverticulitis with evidence of contained perforation s/p exploratory laparotomy, left hemicolectomy, end colostomy, and ABThera VAC placement on 7/1, s/p abdominal washout x3 (last 7/9) and return to OR on 7/11 for abdominal closure and Vac placement w/ hospital course c/b septic shock 2/2 perforated diverticulitis/feculent peritonitis, protein-calorie malnutrition requiring TPN, and new anemia. Hematology consulted for further evaluation.    ** NOTE INCOMPLETE ** Patient is a 73 y/o F (Anglican) w/ HFrEF s/p CRT-D, Asthma, HTN, HLD, and A-fib on Eliquis who initially p/w severe, sudden onset L-sided abdominal pain and was found to have a proximal descending colon/splenic flexure acute diverticulitis with evidence of contained perforation s/p exploratory laparotomy, left hemicolectomy, end colostomy, and ABThera VAC placement on 7/1, s/p abdominal washout x3 (last 7/9) and return to OR on 7/11 for abdominal closure and Vac placement w/ hospital course c/b septic shock 2/2 perforated diverticulitis/feculent peritonitis, protein-calorie malnutrition requiring TPN, and new anemia. Hematology consulted for further evaluation.    # Anemia  - Likely multifactorial in the setting of septic shock, recent surgeries, and malnutrition.   - Patient's H/H has downtrended since presentation, but has remained relatively stable (7s-8s) since 7/4.   - Check erythropoietin level, ferritin, iron studies, vitamin B12, folate, and reticulocyte count. Continue to use pediatric vials to obtain lab work.  - Patient is a Anglican and does not accept blood products. She was started empirically on IV Sucrose on 7/13. Would recommend starting Epogen as patient does allow this. Recommend starting Epogen 20,000 SQ w/ IV Iron QD x5 days and then switch Epogen to every other day.   - Continue to monitor CBC and will assess when Epogen can be discontinued  - C/w TPN per Nutrition's recs    Plan d/w patient's daughter and primary team    Doug Miguel, PGY4  Hematology-Oncology Fellow  Pager: [phone] Patient is a 75 y/o F (Gnosticist) w/ HFrEF s/p CRT-D, Asthma, HTN, HLD, and A-fib on Eliquis who initially p/w severe, sudden onset L-sided abdominal pain and was found to have a proximal descending colon/splenic flexure acute diverticulitis with evidence of contained perforation s/p exploratory laparotomy, left hemicolectomy, end colostomy, and ABThera VAC placement on 7/1, s/p abdominal washout x3 (last 7/9) and return to OR on 7/11 for abdominal closure and Vac placement w/ hospital course c/b septic shock 2/2 perforated diverticulitis/feculent peritonitis, protein-calorie malnutrition requiring TPN, and new anemia. Hematology consulted for further evaluation.    # Anemia  - Likely multifactorial in the setting of septic shock, recent surgeries, and malnutrition.   - Patient's H/H has downtrended since presentation, but has remained relatively stable (7s-8s) since 7/4.   - Check erythropoietin level, ferritin, iron studies, vitamin B12, folate, and reticulocyte count.   - Patient is a Gnosticist and does not accept blood products. She was started empirically on IV Sucrose on 7/13. Recommend starting Epogen 20,000 SQ QD x5 days and then switch Epogen to every other day.  - Continue to use pediatric vials to obtain lab work and minimize blood draws as much as possible    - Continue to monitor CBC and will assess when Epogen can be discontinued  - C/w TPN per Nutrition's recs    Plan d/w patient's daughter and primary team    Doug Miguel, PGY4  Hematology-Oncology Fellow  Pager: [phone] Patient is a 73 y/o F (Nondenominational) w/ HFrEF s/p CRT-D, Asthma, HTN, HLD, and A-fib on Eliquis who initially p/w severe, sudden onset L-sided abdominal pain and was found to have a proximal descending colon/splenic flexure acute diverticulitis with evidence of contained perforation s/p exploratory laparotomy, left hemicolectomy, end colostomy, and ABThera VAC placement on 7/1, s/p abdominal washout x3 (last 7/9) and return to OR on 7/11 for abdominal closure and Vac placement w/ hospital course c/b septic shock 2/2 perforated diverticulitis/feculent peritonitis, protein-calorie malnutrition requiring TPN, and new anemia. Hematology consulted for further evaluation.    # Anemia  - Likely multifactorial in the setting of septic shock, recent surgeries, and malnutrition.   - Patient's H/H has downtrended since presentation, but has remained relatively stable (7s-8s) since 7/4.   - Check erythropoietin level, ferritin, iron studies, vitamin B12, folate, and reticulocyte count.   - Patient is a Nondenominational and does not accept blood products. She was started empirically on IV Sucrose on 7/13. Recommend starting Procrit 20,000 SQ QD x5 days and then switch Procrit to every other day.  - Continue to use pediatric vials to obtain lab work and minimize blood draws as much as possible    - Continue to monitor CBC and will assess when Procrit can be discontinued  - C/w TPN per Nutrition's recs    Plan d/w patient's daughter and primary team    Doug Miguel, PGY4  Hematology-Oncology Fellow  Pager: [phone] Patient is a 73 y/o F (Yarsani) w/ HFrEF s/p CRT-D, Asthma, HTN, HLD, and A-fib on Eliquis who initially p/w severe, sudden onset L-sided abdominal pain and was found to have a proximal descending colon/splenic flexure acute diverticulitis with evidence of contained perforation s/p exploratory laparotomy, left hemicolectomy, end colostomy, and ABThera VAC placement on 7/1, s/p abdominal washout x3 (last 7/9) and return to OR on 7/11 for abdominal closure and Vac placement w/ hospital course c/b septic shock 2/2 perforated diverticulitis/feculent peritonitis, protein-calorie malnutrition requiring TPN, and new anemia. Hematology consulted for further evaluation.    # Anemia  - Likely multifactorial in the setting of septic shock, recent surgeries, and malnutrition.   - Patient's H/H has downtrended since presentation, but has remained relatively stable (7s-8s) since 7/4.   - Check erythropoietin level, ferritin, iron studies, vitamin B12, folate, and reticulocyte count.   - Patient is a Yarsani and does not accept blood products. She was started empirically on IV Sucrose on 7/13. Recommend starting Procrit 20,000 SQ QD x5 days while receiving IV Sucrose  - Continue to use pediatric vials to obtain lab work and minimize blood draws as much as possible    - Continue to monitor CBC and will assess when Procrit can be discontinued  - C/w TPN per Nutrition's recs    Plan d/w patient's daughter and primary team    Doug Miguel, PGY4  Hematology-Oncology Fellow  Pager: [phone]

## 2019-07-16 NOTE — PROGRESS NOTE ADULT - ATTENDING COMMENTS
I have seen and evaluated the patient and discussed the relevant clinical findings and plan with the surgical housestaff and fellow.  Agree with the above documentation with addenda as noted.     Patient with increased work of breathing and productive cough overnight, hypoxic requiring supplemental O2.  Feels better this am but still weak with cough.  Abdomen remains soft, vac in place, dressing is clean.  Ostomy pink and viable.    Plan to transfer to SICU for respiratory distress  Needs CXR, chest PT, nebs  Pulm to see  Cont TPN  Cont abx as per ID  Hct stable; daughter is requesting hemoatology consultation    Clark Alvarado MD

## 2019-07-16 NOTE — CHART NOTE - NSCHARTNOTEFT_GEN_A_CORE
Nutrition Follow Up Note    Patient seen for: nutrition/TPN follow up and transfer to 8ICU this morning (secondary to SOB and chest pain)    Source: medical records, TPN Team rounds    Chart reviewed, events noted. 74F admitted with "perforated diverticulitis s/p exploratory laparotomy, left hemicolectomy, end colostomy, and ABThera Vac placement, s/p 3x RTOR for abdominal washout and abthera vac placement, abdomen closed with Strattice, 2 garcia drains placed (upper abdomen and pelvis), black vac placed over mesh."    Pt NPO as of . TPN initiated , currently tolerating at goal. TPN volume reduced to max 2L in setting of diuresis. Pt previously noted with hypernatremia; TPN contains no NaCl or sodium acetate. Hyperglycemia noted; currently 60 units insulin in TPN.    Diet : NPO except meds     Parenteral Nutrition: TPN (ordered 7/15) infusing at 83ml/hr (120Gm amino acids, 210Gm dextrose, 40Gm SMOF lipids) to provide 1594cal/day (19cal/Kg and 1.4Gm protein/Kg per dosing wt 83.2Kg); with 10ml MVI, 3ml MTE-5.     Per IBW 52.3Kg, TPN meets 30cal/Kg and 2.3Gm protein/Kg.    NGT output: 350ml  Colostomy output: 5ml  VAC: 100ml    Daily Weight in k.1 (-14), Weight in k.1 (-13), Weight in k.2 (-12), Weight in k.8 (-10)    Drug Dosing Weight  Weight (kg): 83.2 (2019 11:20)  BMI (kg/m2): 32.5 (2019 11:20)    IBW 52.3Kg    Pertinent Medications: MEDICATIONS  (STANDING):  ALBUTerol/ipratropium for Nebulization 3 milliLiter(s) Nebulizer every 6 hours  buDESOnide    Inhalation Suspension 0.5 milliGRAM(s) Inhalation every 12 hours  calcium gluconate IVPB 2 Gram(s) IV Intermittent once  calcium gluconate IVPB 2 Gram(s) IV Intermittent once  chlorhexidine 2% Cloths 1 Application(s) Topical daily  enalaprilat Injectable 2.5 milliGRAM(s) IV Push every 6 hours  enoxaparin Injectable 85 milliGRAM(s) SubCutaneous two times a day  fat emulsion (Fish Oil and Plant Based) 20% Infusion 16.7 mL/Hr (16.7 mL/Hr) IV Continuous <Continuous>  furosemide   Injectable 20 milliGRAM(s) IV Push daily  imipenem/cilastatin  IVPB 500 milliGRAM(s) IV Intermittent every 6 hours  insulin lispro (HumaLOG) corrective regimen sliding scale   SubCutaneous every 6 hours  iron sucrose IVPB 200 milliGRAM(s) IV Intermittent every 24 hours  pantoprazole  Injectable 40 milliGRAM(s) IV Push every 24 hours  Parenteral Nutrition - Adult 1 Each (83 mL/Hr) TPN Continuous <Continuous>  potassium chloride  10 mEq/100 mL IVPB 10 milliEquivalent(s) IV Intermittent every 1 hour    MEDICATIONS  (PRN):  benzocaine 15 mG/menthol 3.6 mG Lozenge 1 Lozenge Oral three times a day PRN Sore Throat  HYDROmorphone  Injectable 0.5 milliGRAM(s) IV Push every 3 hours PRN pain      LABS:    @ 09:31: Sodium 140, Potassium 3.8, Chloride 104, Calcium 8.6, Magnesium --, Phosphorus --, BUN 31<H>, Creatinine 0.62, <H>,   @ 09:06: Hemoglobin 7.3<L>, Hematocrit 21.3<L>   @ 07:33: Sodium 120<LL>, Potassium >9.0<HH>, Chloride 97, Calcium 7.6<L>, Magnesium 2.4, Phosphorus 6.5<H>, BUN 30<H>, Creatinine <0.30<L>, <H>,     POCT Blood Glucose.: 119 mg/dL (2019 05:27)  POCT Blood Glucose.: 123 mg/dL (2019 00:11)  POCT Blood Glucose.: 141 mg/dL (15 Jul 2019 18:15)  POCT Blood Glucose.: 132 mg/dL (15 Jul 2019 12:29)      Skin per nursing documentation:   Edema:    Estimated Needs:   [ ] no change since previous assessment  [ ] recalculated:     Previous Nutrition Diagnosis:   Nutrition Diagnosis is:    New Nutrition Diagnosis:  Related to:    As evidenced by:      Interventions:     Recommend  1) Diet  2) Continue to reinforce therapeutic diet education as able    Monitoring and Evaluation:     Continue to monitor nutritional intake, tolerance to diet prescription, weights, labs, skin integrity    RD remains available upon request and will follow up per protocol    Emilie Gaffney MS RD N [facility], Pager # [phone] Nutrition Follow Up Note    Patient seen for: nutrition/TPN follow up and transfer to 8ICU this morning (secondary to SOB and chest pain)    Source: medical records, TPN Team rounds    Chart reviewed, events noted. 74F admitted with "perforated diverticulitis s/p exploratory laparotomy, left hemicolectomy, end colostomy, and ABThera Vac placement, s/p 3x RTOR for abdominal washout and abthera vac placement, abdomen closed with Strattice, 2 garcia drains placed (upper abdomen and pelvis), black vac placed over mesh."    Pt NPO as of . TPN initiated , currently tolerating at goal. (Calculation error noted on  Chart Note, TPN calculation corrected in this note.) TPN volume reduced to max 2L in setting of diuresis. Pt previously noted with hypernatremia; TPN contains no NaCl or sodium acetate. Hyperglycemia noted; currently 60 units insulin in TPN. Pt reported feeling of hunger; lipids increased by 10Gm today to reflect increased calorie needs S/P extubation. New TPN provision will provide 1694cal/day (20cal/Kg).    Diet : NPO except meds     Parenteral Nutrition: TPN (ordered 7/15) infusing at 83ml/hr (120Gm amino acids, 210Gm dextrose, 40Gm SMOF lipids) to provide 1594cal/day (19cal/Kg and 1.4Gm protein/Kg per dosing wt 83.2Kg); with 10ml MVI, 3ml MTE-5.     Per IBW 52.3Kg, TPN meets 30cal/Kg and 2.3Gm protein/Kg.    NGT output: 350ml  Colostomy output: 5ml  VAC: 100ml    Daily Weight in k.1 (-14), Weight in k.1 (-), Weight in k.2 (-12), Weight in k.8 (07-10)    Drug Dosing Weight  Weight (kg): 83.2 (2019 11:20)  BMI (kg/m2): 32.5 (2019 11:20)    IBW 52.3Kg    Pertinent Medications: MEDICATIONS  (STANDING):  ALBUTerol/ipratropium for Nebulization 3 milliLiter(s) Nebulizer every 6 hours  buDESOnide    Inhalation Suspension 0.5 milliGRAM(s) Inhalation every 12 hours  calcium gluconate IVPB 2 Gram(s) IV Intermittent once  calcium gluconate IVPB 2 Gram(s) IV Intermittent once  chlorhexidine 2% Cloths 1 Application(s) Topical daily  enalaprilat Injectable 2.5 milliGRAM(s) IV Push every 6 hours  enoxaparin Injectable 85 milliGRAM(s) SubCutaneous two times a day  fat emulsion (Fish Oil and Plant Based) 20% Infusion 16.7 mL/Hr (16.7 mL/Hr) IV Continuous <Continuous>  furosemide   Injectable 20 milliGRAM(s) IV Push daily  imipenem/cilastatin  IVPB 500 milliGRAM(s) IV Intermittent every 6 hours  insulin lispro (HumaLOG) corrective regimen sliding scale   SubCutaneous every 6 hours  iron sucrose IVPB 200 milliGRAM(s) IV Intermittent every 24 hours  pantoprazole  Injectable 40 milliGRAM(s) IV Push every 24 hours  Parenteral Nutrition - Adult 1 Each (83 mL/Hr) TPN Continuous <Continuous>  potassium chloride  10 mEq/100 mL IVPB 10 milliEquivalent(s) IV Intermittent every 1 hour    MEDICATIONS  (PRN):  benzocaine 15 mG/menthol 3.6 mG Lozenge 1 Lozenge Oral three times a day PRN Sore Throat  HYDROmorphone  Injectable 0.5 milliGRAM(s) IV Push every 3 hours PRN pain    LABS:    @ 09:31: Sodium 140, Potassium 3.8, Chloride 104, Calcium 8.6, Magnesium --, Phosphorus --, BUN 31<H>, Creatinine 0.62, <H>,   @ 09:06: Hemoglobin 7.3<L>, Hematocrit 21.3<L>   @ 07:33: Sodium 120<LL>, Potassium >9.0<HH>, Chloride 97, Calcium 7.6<L>, Magnesium 2.4, Phosphorus 6.5<H>, BUN 30<H>, Creatinine <0.30<L>, <H>    Triglycerides 175 <H>    POCT Blood Glucose.: 119 mg/dL (2019 05:27)  POCT Blood Glucose.: 123 mg/dL (2019 00:11)  POCT Blood Glucose.: 141 mg/dL (15 Jul 2019 18:15)  POCT Blood Glucose.: 132 mg/dL (15 Jul 2019 12:29)    Skin per nursing documentation: no pressure injuries noted  Edema: 3+ dependent    Estimated Needs: based on dosing wt 83.2Kg with consideration for BMI>30; calories increased for extubation  1465-5957 mark/day (20-25cal/Kg)  116-133 Gm protein/day (1.4-1.6Gm/Kg)    Previous Nutrition Diagnosis: Inadequate Protein-Energy Intake  Nutrition Diagnosis is: ongoing, being addressed with TPN    New Nutrition Diagnosis: none     Interventions:     Recommend  1) TPN per TPN Team/Nutrition assessment      Monitoring and Evaluation:     Continue to monitor nutritional provision and tolerance, weights, labs, skin integrity.    RD remains available upon request and will follow up per protocol.    Emilie Gaffney MS RD CDN [facility], Pager # [phone]. Nutrition Follow Up Note    Patient seen for: nutrition/TPN follow up and transfer to 8ICU this morning (secondary to SOB and chest pain)    Source: medical records, TPN Team rounds    Chart reviewed, events noted. 74F admitted with "perforated diverticulitis s/p exploratory laparotomy, left hemicolectomy, end colostomy, and ABThera Vac placement, s/p 3x RTOR for abdominal washout and abthera vac placement, abdomen closed with Strattice, 2 garcia drains placed (upper abdomen and pelvis), black vac placed over mesh."    Pt NPO as of . TPN initiated , currently tolerating at goal. (Calculation error noted on  Chart Note, TPN calculation corrected in this note.) TPN volume reduced to max 2L in setting of diuresis. Pt previously noted with hypernatremia; TPN contains no NaCl or sodium acetate. Hyperglycemia noted; currently 60 units insulin in TPN.     Pt reported feeling of hunger; lipids increased by 10Gm today to reflect increased calorie needs S/P extubation. New TPN provision will provide 1694cal/day (20cal/Kg per dosing wt 83.2Kg; 32cal/Kg per IBW 52.3kg).    Diet : NPO except meds     Parenteral Nutrition: TPN (ordered 7/15) infusing at 83ml/hr (120Gm amino acids, 210Gm dextrose, 40Gm SMOF lipids) to provide 1594cal/day (19cal/Kg and 1.4Gm protein/Kg per dosing wt 83.2Kg); with 10ml MVI, 3ml MTE-5.     Per IBW 52.3Kg, TPN meets 30cal/Kg and 2.3Gm protein/Kg.    NGT output: 350ml  Colostomy output: 5ml  VAC: 100ml    Daily Weight in k.1 (-14), Weight in k.1 (-), Weight in k.2 (-12), Weight in k.8 (07-10)    Drug Dosing Weight  Weight (kg): 83.2 (2019 11:20)  BMI (kg/m2): 32.5 (2019 11:20)    IBW 52.3Kg    Pertinent Medications: MEDICATIONS  (STANDING):  ALBUTerol/ipratropium for Nebulization 3 milliLiter(s) Nebulizer every 6 hours  buDESOnide    Inhalation Suspension 0.5 milliGRAM(s) Inhalation every 12 hours  calcium gluconate IVPB 2 Gram(s) IV Intermittent once  calcium gluconate IVPB 2 Gram(s) IV Intermittent once  chlorhexidine 2% Cloths 1 Application(s) Topical daily  enalaprilat Injectable 2.5 milliGRAM(s) IV Push every 6 hours  enoxaparin Injectable 85 milliGRAM(s) SubCutaneous two times a day  fat emulsion (Fish Oil and Plant Based) 20% Infusion 16.7 mL/Hr (16.7 mL/Hr) IV Continuous <Continuous>  furosemide   Injectable 20 milliGRAM(s) IV Push daily  imipenem/cilastatin  IVPB 500 milliGRAM(s) IV Intermittent every 6 hours  insulin lispro (HumaLOG) corrective regimen sliding scale   SubCutaneous every 6 hours  iron sucrose IVPB 200 milliGRAM(s) IV Intermittent every 24 hours  pantoprazole  Injectable 40 milliGRAM(s) IV Push every 24 hours  Parenteral Nutrition - Adult 1 Each (83 mL/Hr) TPN Continuous <Continuous>  potassium chloride  10 mEq/100 mL IVPB 10 milliEquivalent(s) IV Intermittent every 1 hour    MEDICATIONS  (PRN):  benzocaine 15 mG/menthol 3.6 mG Lozenge 1 Lozenge Oral three times a day PRN Sore Throat  HYDROmorphone  Injectable 0.5 milliGRAM(s) IV Push every 3 hours PRN pain    LABS:    @ 09:31: Sodium 140, Potassium 3.8, Chloride 104, Calcium 8.6, Magnesium --, Phosphorus --, BUN 31<H>, Creatinine 0.62, <H>,   @ 09:06: Hemoglobin 7.3<L>, Hematocrit 21.3<L>   @ 07:33: Sodium 120<LL>, Potassium >9.0<HH>, Chloride 97, Calcium 7.6<L>, Magnesium 2.4, Phosphorus 6.5<H>, BUN 30<H>, Creatinine <0.30<L>, <H>    Triglycerides 175 <H>    POCT Blood Glucose.: 119 mg/dL (2019 05:27)  POCT Blood Glucose.: 123 mg/dL (2019 00:11)  POCT Blood Glucose.: 141 mg/dL (15 Jul 2019 18:15)  POCT Blood Glucose.: 132 mg/dL (15 Jul 2019 12:29)    Skin per nursing documentation: no pressure injuries noted  Edema: 3+ dependent    Estimated Needs: based on dosing wt 83.2Kg with consideration for BMI>30; calories increased for extubation  5944-4493 mark/day (20-25cal/Kg)  116-133 Gm protein/day (1.4-1.6Gm/Kg)    Previous Nutrition Diagnosis: Inadequate Protein-Energy Intake  Nutrition Diagnosis is: ongoing, being addressed with TPN    New Nutrition Diagnosis: none     Interventions:     Recommend  1) TPN per TPN Team/Nutrition assessment      Monitoring and Evaluation:     Continue to monitor nutritional provision and tolerance, weights, labs, skin integrity.    RD remains available upon request and will follow up per protocol.    Emilie Gaffney MS RD CDN [facility], Pager # [phone].

## 2019-07-16 NOTE — CONSULT NOTE ADULT - CONSULT REASON
TPN
Anemia, Patient is a Congregation
Chronic systolic heart failure
Diverticulitis
Intraabdominal Abscess
hypoxic resp failure
perf diverticulitis
Hypoxemia, difficulty weaning from BIPAP
Peritonitis

## 2019-07-16 NOTE — CONSULT NOTE ADULT - ASSESSMENT
74 year old female PMHx of HFrEF s/p AICD, asthma, HTN, HLD, atrial fibrillation on eliquis who presented to [facility] on 6/30 with diverticulitis at splenic flexure with perforation on repeat scan on 7/1.  On 7/2 patient underwent Exploratory laparotomy, partial colectomy, end colostomy, and placement of temporary abdominal VAC dressing. Intraoperatively was noted of having perforation of distal transverse colon with feculent peritonitis. On 7/3 patient underwent Reopening of abdominal wound and an abdominal washout. On 7/9 patient RTOR for Abthera Vac placement and was found to have viable Edematous bowel and minimal pus-like fluid in pelvis which was sent for culture. On 7/10 hypotensive to SBP in the 80's and broadened to Imipenem. Wound cultures from 7/2/19 with E. coli, AHS, Strep mitis and Enterococcus faecalis (amp susceptible). 74y Female with history of asthma, HTN, HLD, atrial fibrillation on eliquis, HFrEF admitted for diverticulitis c/b perforation requiring hemicolectomy and SICU stay with multiple washouts, extubated 7/12 with difficulty weaning from BIPAP this AM due to hypoxemia however now saturating low 90s on room air.     # Hypoxemia: Likely in the setting of pulmonary secretions and atelectasis as well as underlying asthma, now improved to low 90s on room air  - Chest PT with frequent suctioning  - Incentive spirometry  - O2 support as needed    # Asthma  - Continue duonebs q6h and PRN for wheezing  - Can change pulmicort to symbicort  - O2 support    Rodger King MD  Pulmonary [facility]  [phone] 74y Female with history of asthma, HTN, HLD, atrial fibrillation on eliquis, HFrEF admitted for diverticulitis c/b perforation requiring hemicolectomy and SICU stay with multiple washouts, extubated 7/12 with difficulty weaning from BIPAP this AM due to hypoxemia however now saturating low 90s on room air.     # Hypoxemia: Likely in the setting of pulmonary secretions and atelectasis as well as underlying asthma, now improved to low 90s on room air  - Chest PT with frequent suctioning  - Incentive spirometry  - O2 support as needed    # Asthma  - Continue duonebs q6h and PRN for wheezing  - Can change pulmicort to symbicort  - O2 support    Please call with any questions.     Rodger King MD  Pulmonary [facility]  [phone]

## 2019-07-16 NOTE — PROGRESS NOTE ADULT - SUBJECTIVE AND OBJECTIVE BOX
Follow Up:  Perforated Diverticulitis    Interval History: Overnight had worsening cough and chest congestion and was moved to 8U this AM. No chills or fevers. No significant change in abdominal pain. No N/V/D.     REVIEW OF SYSTEMS  [  ] ROS unobtainable because:    [ x ] All other systems negative except as noted below:	    Constitutional:  [ ] fever [ ] chills  [ ] weight loss  [ ] weakness  Skin:  [ ] rash [ ] phlebitis	  Eyes: [ ] icterus [ ] pain  [ ] discharge	  ENMT: [x ] sore throat  [ ] thrush [ ] ulcers [ ] exudates  Respiratory: [x ] dyspnea [ ] hemoptysis [x ] cough [ ] sputum	  Cardiovascular:  [ ] chest pain [ ] palpitations [ ] edema	  Gastrointestinal:  [ ] nausea [ ] vomiting [ ] diarrhea [ ] constipation [ x] pain	  Genitourinary:  [ ] dysuria [ ] frequency [ ] hematuria [ ] discharge [ ] flank pain  [ ] incontinence  Musculoskeletal:  [ ] myalgias [ ] arthralgias [ ] arthritis  [ ] back pain  Neurological:  [ ] headache [ ] seizures  [ ] confusion/altered mental status  Psychiatric:  [ ] anxiety [ ] depression	  Hematology/Lymphatics:  [ ] lymphadenopathy  Endocrine:  [ ] adrenal [ ] thyroid  Allergic/Immunologic:	 [ ] transplant [ ] seasonal    Allergies  Coreg (Other)  digoxin (Other; Short breath (Mild to Mod))  Lopressor (Short breath)  penicillins (Hives)        ANTIMICROBIALS:  imipenem/cilastatin  IVPB 500 every 6 hours      OTHER MEDS:  MEDICATIONS  (STANDING):  ALBUTerol/ipratropium for Nebulization 3 every 6 hours  buDESOnide    Inhalation Suspension 0.5 every 12 hours  enalaprilat Injectable 2.5 every 6 hours  enoxaparin Injectable 85 two times a day  furosemide   Injectable 20 daily  HYDROmorphone  Injectable 0.5 every 3 hours PRN  insulin lispro (HumaLOG) corrective regimen sliding scale  every 6 hours  pantoprazole  Injectable 40 every 24 hours      Vital Signs Last 24 Hrs  T(C): 36.9 (16 Jul 2019 09:45), Max: 37.3 (16 Jul 2019 05:45)  T(F): 98.5 (16 Jul 2019 09:45), Max: 99.2 (16 Jul 2019 05:45)  HR: 114 (16 Jul 2019 12:00) (106 - 114)  BP: 132/66 (16 Jul 2019 12:00) (108/72 - 144/75)  BP(mean): 92 (16 Jul 2019 12:00) (84 - 92)  RR: 26 (16 Jul 2019 12:00) (18 - 51)  SpO2: 96% (16 Jul 2019 12:00) (96% - 100%)    PHYSICAL EXAMINATION:  General: Alert and Awake, NAD  HEENT: PERRL, EOMI, No subconjunctival hemorrhages, Oropharynx Clear, MMM  Neck: Supple, No SARAHI  Cardiac: RRR, No M/R/G  Resp: CTAB, No Wh/Rh/Ra  Abdomen: Obese, +RLQ ostomy with minimal stool output. +midline wound vac with serosanguinous drainage, +2 drains on left side of abdomen. Bottom drain with serosanguinous material draining but upper drain with purulent/sanguinous drainage, NBS, ND, tender to palpation diffusely but worst in LLQ and LUQ, No HSM, No rigidity or guarding  MSK: No LE edema. No stigmata of IE. No evidence of phlebitis. No evidence of synovitis.  : + starr  Skin: No rashes or lesions. Skin is warm and dry to the touch.   Neuro: Alert and Awake. CN 2-12 Grossly intact. Moves all four extremities spontaneously.  Psych: Calm, Pleasant, Cooperative                          7.3    14.4  )-----------( 279      ( 16 Jul 2019 09:06 )             21.3       07-16    140  |  104  |  31<H>  ----------------------------<  151<H>  3.8   |  26  |  0.62    Ca    8.6      16 Jul 2019 09:31  Phos  6.5     07-16  Mg     2.4     07-16    TPro  6.4  /  Alb  2.3<L>  /  TBili  0.4  /  DBili  0.2  /  AST  19  /  ALT  11  /  AlkPhos  149<H>  07-16          MICROBIOLOGY:  v  .Blood  07-10-19   No growth at 5 days.  --  --      .Blood  07-10-19   No growth at 5 days.  --  --      .Body Fluid abdominal fluid  07-09-19   Few Enterococcus faecalis  Few Coag Negative Staphylococcus  --  Enterococcus faecalis  Coag Negative Staphylococcus      .Body Fluid abdominal fluid  07-09-19   Few Enterococcus faecalis  Few Coag Negative Staphylococcus  --  Enterococcus faecalis  Coag Negative Staphylococcus      .Surgical Swab None Specimen Source:  abdomen, esw  07-02-19   Moderate Escherichia coli  Moderate Alpha hemolytic strep "Susceptibilities not performed"  Moderate Strep mitis oralis group "Susceptibilities not performed"  Growth in fluid media only Enterococcus faecalis  --  Escherichia coli  Enterococcus faecalis      .Blood  07-01-19   No growth at 5 days.  --  --      .Urine  07-01-19   No growth  --  --    RADIOLOGY:    EXAM:  XR CHEST PORTABLE URGENT 1V                        PROCEDURE DATE:  07/16/2019    The heart is normal in size. The lungs are clear. NG tube is in the    stomach however its tip is not seen on the current study. A pacer is in   good position. No change in the appear of the chest when compared to   previous study done July 14, 2019.

## 2019-07-16 NOTE — PROGRESS NOTE ADULT - SUBJECTIVE AND OBJECTIVE BOX
HISTORY  74y Female with history of HFrEF s/p AICD, asthma, HTN, HLD, atrial fibrillation on Eliquis presented with severe, sudden onset L sided abd pain which started on the morning of 6/30/19 after eating. She endorsed nausea at the time but no vomiting. She did not have any fevers, chills, diarrhea or hematochezia.  CTAP w/ IV contrast showed proximal descending colon/splenic flexure diverticulitis with small amount of free fluid and 3cm low attenuation structure in the right adnexa. She developed dyspnea and wheezing after the CT scan and became hypoxic. Bedside US int he ED demonstrated a few B lines. She received 80mg total of IV L.asix, 125 of solumedrol, Duonebs x2 and Narcan and she was admitted to MICU for management of respiratory and heart failure.     Through the next day (7/1), she was persistently tachycardic with BPs as low as 76/50, MAPs in low 60s. She was bolused with 500cc of crystalloid without improvement in blood pressure. She was then started on phenylephrine. Her lactate was uptrending from 2 to 2.6 to 3 on the arterial blood gas. A repeat CT scan showed acute diverticulitis at the splenic flexure with evidence of contained perforation, but no drainable collection. 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 Pt was left open with Abthera vac. She was taken back to the OR multiple times 7/3, 7/5, and 7/9) for washout of purulent ascites, but was unable to be closed secondary to edematous bowel. She was diuresed and then on 7/11 she was taken to OR and closed with strattice mesh. 2 Wolfgang drains left in place, and wound vac placed over mesh. She returned to SICU intubated on 7/11.    24 HOUR EVENTS:  - Pt was transferred to the floor on 7/14  - Started developing progressive shortness of breath and this morning reported chest pain  - Concern for PE given hs of Afib and pt has been off AC for the past 2wk, was started yesterday on t-Lovenox  - SICU was reconsulted for respiratory distress     SUBJECTIVE/ROS:  [ ] A ten-point review of systems was otherwise negative except as noted.  [ ] Due to altered mental status/intubation, subjective information were not able to be obtained from the patient. History was obtained, to the extent possible, from review of the chart and collateral sources of information.      NEURO  GCS: 15  Exam: awake, alert, oriented  Meds: HYDROmorphone  Injectable 0.5 milliGRAM(s) IV Push every 3 hours PRN pain    [x] Adequacy of sedation and pain control has been assessed and adjusted      RESPIRATORY  RR: 29 (07-16-19 @ 16:00) (18 - 54)  SpO2: 94% (07-16-19 @ 16:00) (94% - 100%)  Wt(kg): --  Exam: unlabored, clear to auscultation bilaterally  Mechanical Ventilation: n/a  ABG - ( 16 Jul 2019 10:18 )  pH: 7.46  /  pCO2: 38    /  pO2: 99    / HCO3: 26    / Base Excess: 2.7   /  SaO2: 99      Lactate: x                [N/A] Extubation Readiness Assessed  Meds: ALBUTerol/ipratropium for Nebulization 3 milliLiter(s) Nebulizer every 6 hours  buDESOnide    Inhalation Suspension 0.5 milliGRAM(s) Inhalation every 12 hours        CARDIOVASCULAR  HR: 112 (07-16-19 @ 16:00) (107 - 114)  BP: 140/66 (07-16-19 @ 16:00) (108/72 - 140/66)  BP(mean): 95 (07-16-19 @ 16:00) (81 - 95)    Exam: regular rate and rhythm  Cardiac Rhythm: sinus  Perfusion     [x]Adequate   [ ]Inadequate  Mentation   [x]Normal       [ ]Reduced  Extremities  [x]Warm         [ ]Cool  Volume Status [ ]Hypervolemic [x]Euvolemic [ ]Hypovolemic  Meds: enalaprilat Injectable 2.5 milliGRAM(s) IV Push every 6 hours  furosemide   Injectable 20 milliGRAM(s) IV Push daily        GI/NUTRITION  Exam: soft, nontender, nondistended, VAC w/ good seal; BASIM x2 w/ SS output, ostomy pink w/o output  Diet: NPO/NGT  Meds: pantoprazole  Injectable 40 milliGRAM(s) IV Push every 24 hours      GENITOURINARY  I&O's Detail    07-15 @ 07:01 - 07-16 @ 07:00  --------------------------------------------------------  IN:    fat emulsion (Fish Oil and Plant Based) 20% Infusion: 183 mL    Solution: 100 mL    Solution: 400 mL    TPN (Total Parenteral Nutrition): 1909 mL  Total IN: 2592 mL    OUT:    Colostomy: 5 mL    Drain: 35 mL    Drain: 25 mL    Nasoenteral Tube: 350 mL    VAC (Vacuum Assisted Closure) System: 100 mL    Voided: 2900 mL  Total OUT: 3415 mL    Total NET: -823 mL      07-16 @ 07:01 - 07-16 @ 17:22  --------------------------------------------------------  IN:    Solution: 300 mL    Solution: 200 mL    Solution: 100 mL    TPN (Total Parenteral Nutrition): 581 mL  Total IN: 1181 mL    OUT:    Colostomy: 50 mL    Drain: 5 mL    Drain: 5 mL  Total OUT: 60 mL    Total NET: 1121 mL          07-16    140  |  104  |  31<H>  ----------------------------<  151<H>  3.8   |  26  |  0.62    Ca    8.6      16 Jul 2019 09:31  Phos  6.5     07-16  Mg     2.4     07-16    TPro  6.4  /  Alb  2.3<L>  /  TBili  0.4  /  DBili  0.2  /  AST  19  /  ALT  11  /  AlkPhos  149<H>  07-16    [ ] Ching catheter, indication: N/A  Meds: calcium gluconate IVPB 2 Gram(s) IV Intermittent once  fat emulsion (Fish Oil and Plant Based) 20% Infusion 20.8 mL/Hr IV Continuous <Continuous>  iron sucrose IVPB 200 milliGRAM(s) IV Intermittent every 24 hours  Parenteral Nutrition - Adult 1 Each TPN Continuous <Continuous>  Parenteral Nutrition - Adult 1 Each TPN Continuous <Continuous>        HEMATOLOGIC  Meds: enoxaparin Injectable 85 milliGRAM(s) SubCutaneous two times a day    [x] VTE Prophylaxis                        7.3    14.4  )-----------( 279      ( 16 Jul 2019 09:06 )             21.3       Transfusion     [ ] PRBC   [ ] Platelets   [ ] FFP   [ ] Cryoprecipitate      INFECTIOUS DISEASES  WBC Count: 14.4 K/uL (07-16 @ 09:06)    RECENT CULTURES:    Meds: imipenem/cilastatin  IVPB 500 milliGRAM(s) IV Intermittent every 6 hours        ENDOCRINE  CAPILLARY BLOOD GLUCOSE      POCT Blood Glucose.: 148 mg/dL (16 Jul 2019 13:14)  POCT Blood Glucose.: 119 mg/dL (16 Jul 2019 05:27)  POCT Blood Glucose.: 123 mg/dL (16 Jul 2019 00:11)  POCT Blood Glucose.: 141 mg/dL (15 Jul 2019 18:15)    Meds: insulin lispro (HumaLOG) corrective regimen sliding scale   SubCutaneous every 6 hours        ACCESS DEVICES:  [ ] Peripheral IV  [ ] Central Venous Line	[ ] R	[ ] L	[ ] IJ	[ ] Fem	[ ] SC	Placed:   [ ] Arterial Line		[ ] R	[ ] L	[ ] Fem	[ ] Rad	[ ] Ax	Placed:   [ ] PICC:					[ ] Mediport  [ ] Urinary Catheter, Date Placed:   [x] Necessity of urinary, arterial, and venous catheters discussed    OTHER MEDICATIONS:  benzocaine 15 mG/menthol 3.6 mG Lozenge 1 Lozenge Oral three times a day PRN  chlorhexidine 2% Cloths 1 Application(s) Topical daily      CODE STATUS:      IMAGING: HISTORY  74y Female with history of HFrEF s/p AICD, asthma, HTN, HLD, atrial fibrillation on Eliquis presented with severe, sudden onset L sided abd pain which started on the morning of 6/30/19 after eating. She endorsed nausea at the time but no vomiting. She did not have any fevers, chills, diarrhea or hematochezia.  CTAP w/ IV contrast showed proximal descending colon/splenic flexure diverticulitis with small amount of free fluid and 3cm low attenuation structure in the right adnexa. She developed dyspnea and wheezing after the CT scan and became hypoxic. Bedside US int he ED demonstrated a few B lines. She received 80mg total of IV L.asix, 125 of solumedrol, Duonebs x2 and Narcan and she was admitted to MICU for management of respiratory and heart failure.     Through the next day (7/1), she was persistently tachycardic with BPs as low as 76/50, MAPs in low 60s. She was bolused with 500cc of crystalloid without improvement in blood pressure. She was then started on phenylephrine. Her lactate was uptrending from 2 to 2.6 to 3 on the arterial blood gas. A repeat CT scan showed acute diverticulitis at the splenic flexure with evidence of contained perforation, but no drainable collection. 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 Pt was left open with Abthera vac. She was taken back to the OR multiple times 7/3, 7/5, and 7/9) for washout of purulent ascites, but was unable to be closed secondary to edematous bowel. She was diuresed and then on 7/11 she was taken to OR and closed with strattice mesh. 2 Wolfgang drains left in place, and wound vac placed over mesh. She returned to SICU intubated on 7/11.    24 HOUR EVENTS:  - Pt was transferred to the floor on 7/14  - Started developing progressive shortness of breath and this morning reported chest pain  - Concern for PE given hx of Afib and pt has been off AC for the past 2wk, was started yesterday on t-Lovenox  - SICU was reconsulted for respiratory distress     SUBJECTIVE/ROS:  [ ] A ten-point review of systems was otherwise negative except as noted.  [ ] Due to altered mental status/intubation, subjective information were not able to be obtained from the patient. History was obtained, to the extent possible, from review of the chart and collateral sources of information.      NEURO  GCS: 15  Exam: awake, alert, oriented  Meds: HYDROmorphone  Injectable 0.5 milliGRAM(s) IV Push every 3 hours PRN pain    [x] Adequacy of sedation and pain control has been assessed and adjusted      RESPIRATORY  RR: 29 (07-16-19 @ 16:00) (18 - 54)  SpO2: 94% (07-16-19 @ 16:00) (94% - 100%)  Wt(kg): --  Exam: unlabored, clear to auscultation bilaterally  Mechanical Ventilation: n/a  ABG - ( 16 Jul 2019 10:18 )  pH: 7.46  /  pCO2: 38    /  pO2: 99    / HCO3: 26    / Base Excess: 2.7   /  SaO2: 99      Lactate: x                [N/A] Extubation Readiness Assessed  Meds: ALBUTerol/ipratropium for Nebulization 3 milliLiter(s) Nebulizer every 6 hours  buDESOnide    Inhalation Suspension 0.5 milliGRAM(s) Inhalation every 12 hours        CARDIOVASCULAR  HR: 112 (07-16-19 @ 16:00) (107 - 114)  BP: 140/66 (07-16-19 @ 16:00) (108/72 - 140/66)  BP(mean): 95 (07-16-19 @ 16:00) (81 - 95)    Exam: regular rate and rhythm  Cardiac Rhythm: sinus  Perfusion     [x]Adequate   [ ]Inadequate  Mentation   [x]Normal       [ ]Reduced  Extremities  [x]Warm         [ ]Cool  Volume Status [ ]Hypervolemic [x]Euvolemic [ ]Hypovolemic  Meds: enalaprilat Injectable 2.5 milliGRAM(s) IV Push every 6 hours  furosemide   Injectable 20 milliGRAM(s) IV Push daily        GI/NUTRITION  Exam: soft, nontender, nondistended, VAC w/ good seal; BASIM x2 w/ SS output, ostomy pink w/o output  Diet: NPO/NGT  Meds: pantoprazole  Injectable 40 milliGRAM(s) IV Push every 24 hours      GENITOURINARY  I&O's Detail    07-15 @ 07:01 - 07-16 @ 07:00  --------------------------------------------------------  IN:    fat emulsion (Fish Oil and Plant Based) 20% Infusion: 183 mL    Solution: 100 mL    Solution: 400 mL    TPN (Total Parenteral Nutrition): 1909 mL  Total IN: 2592 mL    OUT:    Colostomy: 5 mL    Drain: 35 mL    Drain: 25 mL    Nasoenteral Tube: 350 mL    VAC (Vacuum Assisted Closure) System: 100 mL    Voided: 2900 mL  Total OUT: 3415 mL    Total NET: -823 mL      07-16 @ 07:01 - 07-16 @ 17:22  --------------------------------------------------------  IN:    Solution: 300 mL    Solution: 200 mL    Solution: 100 mL    TPN (Total Parenteral Nutrition): 581 mL  Total IN: 1181 mL    OUT:    Colostomy: 50 mL    Drain: 5 mL    Drain: 5 mL  Total OUT: 60 mL    Total NET: 1121 mL          07-16    140  |  104  |  31<H>  ----------------------------<  151<H>  3.8   |  26  |  0.62    Ca    8.6      16 Jul 2019 09:31  Phos  6.5     07-16  Mg     2.4     07-16    TPro  6.4  /  Alb  2.3<L>  /  TBili  0.4  /  DBili  0.2  /  AST  19  /  ALT  11  /  AlkPhos  149<H>  07-16    [ ] Ching catheter, indication: N/A  Meds: calcium gluconate IVPB 2 Gram(s) IV Intermittent once  fat emulsion (Fish Oil and Plant Based) 20% Infusion 20.8 mL/Hr IV Continuous <Continuous>  iron sucrose IVPB 200 milliGRAM(s) IV Intermittent every 24 hours  Parenteral Nutrition - Adult 1 Each TPN Continuous <Continuous>  Parenteral Nutrition - Adult 1 Each TPN Continuous <Continuous>        HEMATOLOGIC  Meds: enoxaparin Injectable 85 milliGRAM(s) SubCutaneous two times a day    [x] VTE Prophylaxis                        7.3    14.4  )-----------( 279      ( 16 Jul 2019 09:06 )             21.3       Transfusion     [ ] PRBC   [ ] Platelets   [ ] FFP   [ ] Cryoprecipitate      INFECTIOUS DISEASES  WBC Count: 14.4 K/uL (07-16 @ 09:06)    RECENT CULTURES:    Meds: imipenem/cilastatin  IVPB 500 milliGRAM(s) IV Intermittent every 6 hours        ENDOCRINE  CAPILLARY BLOOD GLUCOSE      POCT Blood Glucose.: 148 mg/dL (16 Jul 2019 13:14)  POCT Blood Glucose.: 119 mg/dL (16 Jul 2019 05:27)  POCT Blood Glucose.: 123 mg/dL (16 Jul 2019 00:11)  POCT Blood Glucose.: 141 mg/dL (15 Jul 2019 18:15)    Meds: insulin lispro (HumaLOG) corrective regimen sliding scale   SubCutaneous every 6 hours        ACCESS DEVICES:  [ ] Peripheral IV  [ ] Central Venous Line	[ ] R	[ ] L	[ ] IJ	[ ] Fem	[ ] SC	Placed:   [ ] Arterial Line		[ ] R	[ ] L	[ ] Fem	[ ] Rad	[ ] Ax	Placed:   [ ] PICC:					[ ] Mediport  [ ] Urinary Catheter, Date Placed:   [x] Necessity of urinary, arterial, and venous catheters discussed    OTHER MEDICATIONS:  benzocaine 15 mG/menthol 3.6 mG Lozenge 1 Lozenge Oral three times a day PRN  chlorhexidine 2% Cloths 1 Application(s) Topical daily      CODE STATUS:      IMAGING:

## 2019-07-16 NOTE — CONSULT NOTE ADULT - CONSULT REQUESTED DATE/TIME
01-Jul-2019 13:00
01-Jul-2019 20:57
01-Jul-2019 21:31
02-Jul-2019 16:52
10-Jul-2019 13:27
16-Jul-2019 17:00
30-Jun-2019 19:39
16-Jul-2019 18:59
05-Jul-2019 14:12

## 2019-07-16 NOTE — PROGRESS NOTE ADULT - ASSESSMENT
ASSESSMENT  74F admitted with abdominal pain and exam findings consistent with peritonitis secondary to acute diverticulitis at the splenic flexure with evidence of contained perforation s/p Exploratory laparotomy, left hemicolectomy, end colostomy, and ABThera Vac placement (7/2) and s/p multiple RTOR for abdominal washout and abthera vac placement (7/3, 7/5, 7/9), closure with strattice mesh and wound vac placement (7/11)    PLAN:  NEURO: acute pain control  - Pain control with Dilaudid PRN and IV Tylenol    RESPIRATORY:  PMHx of Asthma; worsening SOB and CP on 7/14; ABG wnl on RA  - CXR improving, on exam bronchial congestion which improved with treatments   - Continue Duonebs and pulmicort  - Concern for PE given Afib history and pt has been off AC for the past 2 wk, t- Lovenox was started yesterday   - Pulm consult       CARDIOVASCULAR:  atrial fibrillation, HFrEF s/p AICD, non-sustained runs of V-tach   - Metoprolol 5mg q 6hrs   - Hold home anti-hypertensives  - Trend daily weight   - Lasix PRN to keep Net negative     GI/NUTRITION: s/p ex lap and L hemicolectomy with end colostomy and ABThera VAC placement and washout 7/2, s/p closure with strattice mesh and wound vac 7/11  - NPO, NGT   - Continue TPN   - Protonix for stress ulcer prophylaxis  - Monitor for ostomy function  - CT abd/pelv given prolonged ileus   - LFT     GENITOURINARY/RENAL: CKD stage 3  - Ching for strict I&Os - consider D/Cing  - Continue diuresis as needed    HEMATOLOGIC:  * Patient is Christianity & does NOT accept blood products*  - Anemia work up sent  - Appreciate Heme recs   - Minimize blood draws - use pediatric tubes  - t- Lovenox     INFECTIOUS DISEASE: Enterococcus in abdominal fluid culture  - Continue imipenem until 7/16  - Repeat blood cultures if febrile    ENDOCRINE: DM   - ISS  - insulin in TPN 60 units    DISPO: SICU. Full code.    SICU  [id number]

## 2019-07-16 NOTE — PROGRESS NOTE ADULT - SUBJECTIVE AND OBJECTIVE BOX
Interval Events: started on Lasix 20mg IV daily, seen by ID: continue imipinem, RVP+ contributing to respiratory issues not concerned for pneumonia at this time    S: Patient doing well, denies fevers, chills, nausea, emesis, chest pain, SOB. Pain is well controlled. No ostomy function.    O: Vital Signs  T(C): 37.2 (07-16 @ 00:31), Max: 37.2 (07-16 @ 00:31)  HR: 111 (07-16 @ 00:31) (102 - 111)  BP: 120/70 (07-16 @ 00:31) (111/68 - 144/75)  RR: 18 (07-16 @ 00:31) (18 - 18)  SpO2: 100% (07-16 @ 00:31) (100% - 100%)  07-14-19 @ 07:01  -  07-15-19 @ 07:00  --------------------------------------------------------  IN: 2143.4 mL / OUT: 2245 mL / NET: -101.6 mL    07-15-19 @ 07:01  -  07-16-19 @ 04:43  --------------------------------------------------------  IN: 1196 mL / OUT: 1210 mL / NET: -14 mL    General: alert and oriented, NAD  HEENT: NC/AT, NGT secured in place  Resp: airway patent, respirations unlabored  CVS: regular rate and rhythm  Abdomen: soft, nontender, nondistended, ostomy pink with no stool or gas in bag, wound vac in place with good suction, upper BASIM w/serous output, lower BASIM with serosanguinous output  Extremities: + edema UE & LE b/l  Skin: warm, dry, appropriate color                          7.3    16.5  )-----------( 346      ( 15 Jul 2019 08:55 )             22.5   07-15    139  |  105  |  34<H>  ----------------------------<  133<H>  4.4   |  24  |  0.57    Ca    8.2<L>      15 Jul 2019 07:09  Phos  3.3     07-15  Mg     1.7     07-15 Interval Events: started on Lasix 20mg IV daily, seen by ID: continue imipinem    S: Patient doing well, denies fevers, chills, nausea, emesis, chest pain, SOB. Pain is well controlled. No ostomy function.    O: Vital Signs  T(C): 37.2 (07-16 @ 00:31), Max: 37.2 (07-16 @ 00:31)  HR: 111 (07-16 @ 00:31) (102 - 111)  BP: 120/70 (07-16 @ 00:31) (111/68 - 144/75)  RR: 18 (07-16 @ 00:31) (18 - 18)  SpO2: 100% (07-16 @ 00:31) (100% - 100%)  07-14-19 @ 07:01  -  07-15-19 @ 07:00  --------------------------------------------------------  IN: 2143.4 mL / OUT: 2245 mL / NET: -101.6 mL    07-15-19 @ 07:01  -  07-16-19 @ 04:43  --------------------------------------------------------  IN: 1196 mL / OUT: 1210 mL / NET: -14 mL    General: alert and oriented, NAD  HEENT: NC/AT, NGT secured in place  Resp: airway patent, respirations unlabored  CVS: regular rate and rhythm  Abdomen: soft, nontender, nondistended, ostomy pink with no stool or gas in bag, wound vac in place with good suction, upper BASIM w/serous output, lower BASIM with serosanguinous output  Extremities: + edema UE & LE b/l  Skin: warm, dry, appropriate color                          7.3    16.5  )-----------( 346      ( 15 Jul 2019 08:55 )             22.5   07-15    139  |  105  |  34<H>  ----------------------------<  133<H>  4.4   |  24  |  0.57    Ca    8.2<L>      15 Jul 2019 07:09  Phos  3.3     07-15  Mg     1.7     07-15

## 2019-07-16 NOTE — CONSULT NOTE ADULT - PROVIDER SPECIALTY LIST ADULT
Gastroenterology
Heart Failure
Heme/Onc
Infectious Disease
MICU
Pulmonology
SICU
Surgery
Nutrition Support

## 2019-07-16 NOTE — PROGRESS NOTE ADULT - ATTENDING COMMENTS
Patient seen and examined and agree with above.  s/p diverticulitis with feculent peritonitis s/p gautam procedue with HFreF returns to the SICU with hypoxia noted on the floor. The patient in the SICU currently with appropriate oxygenation but does have sinus tachycardia. She denies chest pain or dyspnea.   I have reviewed her hospital course, labs and imaging.   Current management includes:  1) Hypoxia- will continue with nasal cannula supplementation at this time  with goal SpO2 > 92%  -will obtain CT scan to rule out pulmonary embolus    2) Hyperglycemia with DM type 2- continue insulin in TPN  -monitor blood glucose with goal BG < 180    3) systolic heart failure - continue current medications  - appreciate HF team recommendations    4) Chronic atrial fibrillation- continue therapeautic lovenox    No ostomy function yet.    Will monitor cardiac and respiratory status closely as patient does have episodes of sinus tachycardia. Will obtain CT scan of chest  to rule out PE and abdomena/pelvis to rule out intraabdominal process.

## 2019-07-16 NOTE — CONSULT NOTE ADULT - ATTENDING COMMENTS
intubated on mechanical vent (FiO2 = 30%, PEEP = +5)  soft / NT / ND  colostomy bag empty  ABThera in place      sepsis from perforated diverticulitis with intraabdominal hypertension  7/2 - s/p Lupe's / ABThera  7/3 - s/p washout / ABThera  7/5 - washout / ABThera    moderate protein calorie malnutrition  -start TPN with half protein and dextrose  -no lipids today    hypernatremia  -start NaCl 0 mEq and NaAcetate 0 mEq    type 2 DM  -start 5 units insulin
I have seen and evaluated the patient and discussed the relevant clinical findings and plan with the surgical housestaff and fellow.  Agree with the above documentation with addenda as noted.     74y F with multiple medical problems, including heart failure, AFib on eliquis, admitted with perforated diverticulitis 6/30.  I was called this evening for worsening of abdominal exam.  Patient complains of severe left-sided abdominal pain.  Afebrile  On 1 pressor with minimal urine output.  DIANNA  Elevated lactate    Abdomen is distended, tender to palpation left abdomen, without guarding or rebound.    CT scans reviewed -- scan from 6/30 shows perforated diverticulitis of splenic flexure with some localized blips of extraluminal air and reactive inflammation of nearby small bowel with some mesenteric fluid.  Repeat CT done 7/1 with similar findings.    Given lack of improvement on antibiotics, surgery is indicated.  I have discussed procedure with the patient and her daughter at bedside -- exploratory laparotomy with colectomy and likely colostomy.  I have explained that Kcentra is to be given, but she is a significant bleeding risk due to eliquis therapy, and she would not tolerate anemia well given her heart failure.  She is a [employer]ri[employer] and refuses blood products, even in life threatening circumstances.  As she has received steroids for presumed allergic reaction to contrast yesterday, she is also high risk for wound complications.  Also discussed was possibility of prolonged intubation for respiratory failure.    All questions were answered and the patient and her daughter have agreed to proceed with surgery.    Clark Alvarado MD
Currently with resolved hypoxemia. On room air with SpO2>90%. CTPA with no evidence of PE. Stable RAJI lung nodule and unchanged RML lateral segment atelectasis. Continue Duonebs and Pulmicort nebs for underlying asthma. Restart Singulair. Eventually change back to Symbicort when able to resume inhaler use. Needs chest PT, incentive spirometry, physical therapy for basilar atelectasis. Continue follow-up with her pulmonologist (Dr. Daily) upon discharge.
Pt seen and examined on 7/1, agree with above.    1. Septic shock with DIANNA stage 2 on CKD 3, hypotension requiring pressor support, altered mental status, lactic acidosis, and peritonitis in setting of acute diverticulitis, with steroid used for possible allergic reaction on 6/30:  - Pt transferred from MICU to SICU under Surgical service given acute diverticulitis  - POC ultrasound performed: IVC distended without respiratory variation, bilateral ventricles full  - Arterial line placed  - Given tachycardia, neosynephrine continued instead of levophed preoperatively  - I discussed patient with Green fellow and , plan for emergent OR  - Antibiotics changed to aztreonam/flagyl  - I personally reviewed pt's initial and second CT scan images    CC time: 65 minutes
Patient seen and examined. Agree with above. Patient with continued abdominal pain after CT on admission showed diverticulitis. On repeat CT today 7/1/19, there is evidence of a contained perforation, likely the cause of patient's continued symptoms. Would continue board spectrum antibiotics and obtain surgical consultation. No endoscopic intervention indicated at this point in time.
iv iron infusion started by ICU team  add epogen 20K units daily x 5 dys  then every other day   monitor Hb levels  anemia w/u likely to be non-contributory as pt already on tpn and iv iron  cont supportive care  minimize iatrogenic losses w/ use of pediatric tubes for essential labs

## 2019-07-16 NOTE — CONSULT NOTE ADULT - SUBJECTIVE AND OBJECTIVE BOX
CHIEF COMPLAINT:    HPI:    74y Female with history of asthma, HTN, HLD, atrial fibrillation on eliquis, HFrEF presented with abdominal pain due to diverticulitis seen on CT but admitted initially to MICU 6/30 after developing wheezing with IV contrast which was most likely asthma exacerbation. The next day she developed worsened abdominal pain, became tachycardic and hypotensive requiring vasopressor support and repeat CT chest showed evidence of perforation. She was transferred to the SICU and taken to the OR on 7/2 and unde underwent a left hemicolectomy with transverse end colostomy. She was taken back to the OR multiple times 7/3, 7/5, and 7/9) for washout of purulent ascites, but was unable to be closed secondary to edematous bowel until7/11 she was taken to OR and closed with strattice mesh, extubated 7/12. She is being treated with imipenem and is net postive about 3 liters over the course of her hospitalization.    Pulmonology was consulted for difficulty weaning from BIPAP this morning however she is now saturating low 90s on room air. She is receiving duonebs q6h and pulmicort. Pt is chronically ill appearing but denies chest pain or shortness of breath.     PAST MEDICAL & SURGICAL HISTORY:  Refusal of blood transfusions as patient is Confucianist  Patient is Confucianist  Vertigo  Atrial flutter  Diverticulitis  Cardiomyopathy  Kidney stone  Cardiac Pacemaker  HTN - Hypertension  Gout  Diabetes  Congestive Heart Failure  Asthma  S/P cholecystectomy  AICD (Automatic Cardioverter/Defibrillator) Present: inserted in Aug, 2008. Due for battery change in 1 month. ( [employer]) . Inserted by Dr Duffy      FAMILY HISTORY:  Family history of brain tumor      SOCIAL HISTORY:  Smoking: [x ] Never Smoked [ ] Former Smoker (__ packs x ___ years) [ ] Current Smoker  (__ packs x ___ years)  Substance Use: [ x] Never Used [ ] Used ____  EtOH Use: none      Allergies    Coreg (Other)  digoxin (Other; Short breath (Mild to Mod))  Lopressor (Short breath)  penicillins (Hives)    Intolerances    metoprolol (Other)  Solu-Medrol (Other (Mild to Mod))      HOME MEDICATIONS:  Home Medications:  apixaban 5 mg oral tablet: 1 tab(s) orally every 12 hours (30 Jun 2019 22:05)  Aspirin Enteric Coated 81 mg oral delayed release tablet: 1 tab(s) orally once a day (30 Jun 2019 22:05)  atorvastatin 20 mg oral tablet: 1 tab(s) orally once a day (at bedtime) (30 Jun 2019 22:05)  cholecalciferol oral tablet:  (30 Jun 2019 22:05)  DuoNeb 0.5 mg-2.5 mg/3 mL inhalation solution: 3 milliliter(s) inhaled every 6 hours, As Needed (30 Jun 2019 22:05)  enalapril 10 mg oral tablet: 1 tab(s) orally 2 times a day (30 Jun 2019 22:05)  guaiFENesin 100 mg/5 mL oral liquid: 5 milliliter(s) orally every 6 hours, As needed, Cough (30 Jun 2019 22:05)  Lantus Solostar Pen 100 units/mL subcutaneous solution: 12 unit(s) subcutaneous once a day (at bedtime) (30 Jun 2019 22:05)  magnesium oxide 500 mg oral tablet: 1 tab(s) orally 3 times a day (30 Jun 2019 22:05)  Pepcid AC 10 mg oral tablet: 1 tab(s) orally once a day (30 Jun 2019 22:05)  potassium chloride 20 mEq/15 mL oral liquid: 5 milliliter(s) orally once a day, As Needed- cramping (30 Jun 2019 22:05)  repaglinide 0.5 mg oral tablet: 1 tab(s) orally 3 times a day (before meals), As Needed    *patient only takes when her blood sugar is really high* (30 Jun 2019 22:05)  spironolactone 25 mg oral tablet: 1 tab(s) orally once a day (30 Jun 2019 22:05)  tiotropium 18 mcg inhalation capsule: 1 cap(s) inhaled once a day (30 Jun 2019 22:05)  Tylenol Extra Strength 500 mg oral tablet: 2 tab(s) orally , As Needed (30 Jun 2019 22:05)  Ventolin HFA 90 mcg/inh inhalation aerosol: 2 puff(s) inhaled every 4 to 6 hours, As Needed (30 Jun 2019 22:05)  Vitamin C:  (30 Jun 2019 22:05)  ZyrTEC 10 mg oral tablet: 1 tab(s) orally once a day (30 Jun 2019 22:05)      REVIEW OF SYSTEMS:  Constitutional: [ ] negative [ ] fevers [ ] chills [ ] weight loss [ ] weight gain  HEENT: [ ] negative [ ] dry eyes [ ] eye irritation [ ] postnasal drip [ ] nasal congestion  CV: [ ] negative  [ ] chest pain [ ] orthopnea [ ] palpitations [ ] murmur  Resp: [ ] negative [ ] cough [ ] shortness of breath [ ] dyspnea [ ] wheezing [ ] sputum [ ] hemoptysis  GI: [ ] negative [ ] nausea [ ] vomiting [ ] diarrhea [ ] constipation [ ] abd pain [ ] dysphagia   : [ ] negative [ ] dysuria [ ] nocturia [ ] hematuria [ ] increased urinary frequency  Musculoskeletal: [ ] negative [ ] back pain [ ] myalgias [ ] arthralgias [ ] fracture  Skin: [ ] negative [ ] rash [ ] itch  Neurological: [ ] negative [ ] headache [ ] dizziness [ ] syncope [ ] weakness [ ] numbness  Psychiatric: [ ] negative [ ] anxiety [ ] depression  Endocrine: [ ] negative [ ] diabetes [ ] thyroid problem  Hematologic/Lymphatic: [ ] negative [ ] anemia [ ] bleeding problem  Allergic/Immunologic: [ ] negative [ ] itchy eyes [ ] nasal discharge [ ] hives [ ] angioedema  [ ] All other systems negative  [ ] Unable to assess ROS because ________    OBJECTIVE:  ICU Vital Signs Last 24 Hrs  T(C): 36.7 (16 Jul 2019 15:00), Max: 37.3 (16 Jul 2019 05:45)  T(F): 98 (16 Jul 2019 15:00), Max: 99.2 (16 Jul 2019 05:45)  HR: 119 (16 Jul 2019 19:00) (107 - 119)  BP: 121/57 (16 Jul 2019 19:00) (108/72 - 140/66)  BP(mean): 80 (16 Jul 2019 19:00) (79 - 98)  ABP: --  ABP(mean): --  RR: 45 (16 Jul 2019 19:00) (18 - 54)  SpO2: 95% (16 Jul 2019 19:00) (94% - 100%)        07-15 @ 07:01  -  07-16 @ 07:00  --------------------------------------------------------  IN: 2592 mL / OUT: 3415 mL / NET: -823 mL    07-16 @ 07:01  - 07-16 @ 19:08  --------------------------------------------------------  IN: 1471.6 mL / OUT: 620 mL / NET: 851.6 mL      CAPILLARY BLOOD GLUCOSE      POCT Blood Glucose.: 149 mg/dL (16 Jul 2019 18:40)      PHYSICAL EXAM:  General: ill appearing but not in respiratory distress  Skin: Warm and dry  Neuro: AAOx3, nonfocal  HEENT: EOMI, no oral lesions  Neck: Full range of motion, no JVD  Lungs: Mildly decreased breath sound at the bases, no wheezes or rales  Heart: Regular rate and rhythm, no murmurs  Abdomen: Normoactive bowl sounds, soft, nontender, nondistended  Extremities: No lower extremity tenderness, erythema, or edema       HOSPITAL MEDICATIONS:  Standing Meds:  ALBUTerol/ipratropium for Nebulization 3 milliLiter(s) Nebulizer every 6 hours  buDESOnide    Inhalation Suspension 0.5 milliGRAM(s) Inhalation every 12 hours  chlorhexidine 2% Cloths 1 Application(s) Topical daily  enalaprilat Injectable 2.5 milliGRAM(s) IV Push every 6 hours  enoxaparin Injectable 85 milliGRAM(s) SubCutaneous two times a day  fat emulsion (Fish Oil and Plant Based) 20% Infusion 20.8 mL/Hr IV Continuous <Continuous>  furosemide   Injectable 20 milliGRAM(s) IV Push daily  imipenem/cilastatin  IVPB 500 milliGRAM(s) IV Intermittent every 6 hours  insulin lispro (HumaLOG) corrective regimen sliding scale   SubCutaneous every 6 hours  iron sucrose IVPB 200 milliGRAM(s) IV Intermittent every 24 hours  pantoprazole  Injectable 40 milliGRAM(s) IV Push every 24 hours  Parenteral Nutrition - Adult 1 Each TPN Continuous <Continuous>  Parenteral Nutrition - Adult 1 Each TPN Continuous <Continuous>      PRN Meds:  benzocaine 15 mG/menthol 3.6 mG Lozenge 1 Lozenge Oral three times a day PRN  HYDROmorphone  Injectable 0.5 milliGRAM(s) IV Push every 3 hours PRN      LABS:                        7.3    14.4  )-----------( 279      ( 16 Jul 2019 09:06 )             21.3     Hgb Trend: 7.3<--, 7.3<--, 7.4<--, 10.3<--, 7.1<--  07-16    140  |  104  |  31<H>  ----------------------------<  151<H>  3.8   |  26  |  0.62    Ca    8.6      16 Jul 2019 09:31  Phos  6.5     07-16  Mg     2.4     07-16    TPro  6.4  /  Alb  2.3<L>  /  TBili  0.4  /  DBili  0.2  /  AST  19  /  ALT  11  /  AlkPhos  149<H>  07-16    Creatinine Trend: 0.62<--, <0.30<--, 0.57<--, 0.64<--, 0.78<--, 0.82<--      Arterial Blood Gas:  07-16 @ 10:18  7.46/38/99/26/99/2.7  ABG lactate: --      MICROBIOLOGY:     RADIOLOGY:  < from: CT Angio Chest w/ IV Cont (07.16.19 @ 17:06) >  INTERPRETATION:  No pulmonary embolus in the main, left and right, and   lobar pulmonary arteries. Limited evaluation of the segmental and   subsegmental pulmonary arteries secondary to suboptimal contrast bolus   timing and motion artifact.    Trace rightpleural effusion. Small debris in the trachea and bilateral   mainstem bronchi. Unchanged left upper lobe 6 cm pulmonary nodule.   Unchanged atelectasis involving the right middle lobe. Trace bilateral   linear atelectasis.    Large lower anterior abdominal wall wound. Small volume abdominal free   fluid. No well-formed fluid collections.    Mildly dilated loops of small bowel subjacent to the lower anterior   abdominal wall wound, without a discrete transition point.    Tiny focus of vesicular intraluminal air, possibly related to recent   instrumentation.    Follow-up official report in the AM.      < end of copied text >  < from: CT Angio Chest w/ IV Cont (07.16.19 @ 17:06) >  NTERPRETATION:  No pulmonary embolus in the main, left and right, and   lobar pulmonary arteries. Limited evaluation of the segmental and   subsegmental pulmonary arteries secondary to suboptimal contrast bolus   timing and motion artifact.    Trace rightpleural effusion. Small debris in the trachea and bilateral   mainstem bronchi. Unchanged left upper lobe 6 cm pulmonary nodule.   Unchanged atelectasis involving the right middle lobe. Trace bilateral   linear atelectasis.    Large lower anterior abdominal wall wound. Small volume abdominal free   fluid. No well-formed fluid collections.    Mildly dilated loops of small bowel subjacent to the lower anterior   abdominal wall wound, without a discrete transition point.    Tiny focus of vesicular intraluminal air, possibly related to recent   instrumentation.    Follow-up official report in the AM.    < end of copied text > CHIEF COMPLAINT: Hypoxemia     HPI:    74y Female with history of asthma, HTN, HLD, atrial fibrillation on eliquis, HFrEF presented with abdominal pain due to diverticulitis seen on CT but admitted initially to MICU 6/30 after developing wheezing with IV contrast which was most likely asthma exacerbation. The next day she developed worsened abdominal pain, became tachycardic and hypotensive requiring vasopressor support and repeat CT chest showed evidence of perforation. She was transferred to the SICU and taken to the OR on 7/2 and unde underwent a left hemicolectomy with transverse end colostomy. She was taken back to the OR multiple times 7/3, 7/5, and 7/9) for washout of purulent ascites, but was unable to be closed secondary to edematous bowel until7/11 she was taken to OR and closed with strattice mesh, extubated 7/12. She is being treated with imipenem and is net postive about 3 liters over the course of her hospitalization.    Pulmonology was consulted for difficulty weaning from BIPAP this morning however she is now saturating low 90s on room air. She is receiving duonebs q6h and pulmicort. Pt is chronically ill appearing but denies chest pain or shortness of breath.     PAST MEDICAL & SURGICAL HISTORY:  Refusal of blood transfusions as patient is Mosque  Patient is Mosque  Vertigo  Atrial flutter  Diverticulitis  Cardiomyopathy  Kidney stone  Cardiac Pacemaker  HTN - Hypertension  Gout  Diabetes  Congestive Heart Failure  Asthma  S/P cholecystectomy  AICD (Automatic Cardioverter/Defibrillator) Present: inserted in Aug, 2008. Due for battery change in 1 month. ( [employer]) . Inserted by Dr Duffy      FAMILY HISTORY:  Family history of brain tumor      SOCIAL HISTORY:  Smoking: [x ] Never Smoked [ ] Former Smoker (__ packs x ___ years) [ ] Current Smoker  (__ packs x ___ years)  Substance Use: [ x] Never Used [ ] Used ____  EtOH Use: none      Allergies    Coreg (Other)  digoxin (Other; Short breath (Mild to Mod))  Lopressor (Short breath)  penicillins (Hives)    Intolerances    metoprolol (Other)  Solu-Medrol (Other (Mild to Mod))      HOME MEDICATIONS:  Home Medications:  apixaban 5 mg oral tablet: 1 tab(s) orally every 12 hours (30 Jun 2019 22:05)  Aspirin Enteric Coated 81 mg oral delayed release tablet: 1 tab(s) orally once a day (30 Jun 2019 22:05)  atorvastatin 20 mg oral tablet: 1 tab(s) orally once a day (at bedtime) (30 Jun 2019 22:05)  cholecalciferol oral tablet:  (30 Jun 2019 22:05)  DuoNeb 0.5 mg-2.5 mg/3 mL inhalation solution: 3 milliliter(s) inhaled every 6 hours, As Needed (30 Jun 2019 22:05)  enalapril 10 mg oral tablet: 1 tab(s) orally 2 times a day (30 Jun 2019 22:05)  guaiFENesin 100 mg/5 mL oral liquid: 5 milliliter(s) orally every 6 hours, As needed, Cough (30 Jun 2019 22:05)  Lantus Solostar Pen 100 units/mL subcutaneous solution: 12 unit(s) subcutaneous once a day (at bedtime) (30 Jun 2019 22:05)  magnesium oxide 500 mg oral tablet: 1 tab(s) orally 3 times a day (30 Jun 2019 22:05)  Pepcid AC 10 mg oral tablet: 1 tab(s) orally once a day (30 Jun 2019 22:05)  potassium chloride 20 mEq/15 mL oral liquid: 5 milliliter(s) orally once a day, As Needed- cramping (30 Jun 2019 22:05)  repaglinide 0.5 mg oral tablet: 1 tab(s) orally 3 times a day (before meals), As Needed    *patient only takes when her blood sugar is really high* (30 Jun 2019 22:05)  spironolactone 25 mg oral tablet: 1 tab(s) orally once a day (30 Jun 2019 22:05)  tiotropium 18 mcg inhalation capsule: 1 cap(s) inhaled once a day (30 Jun 2019 22:05)  Tylenol Extra Strength 500 mg oral tablet: 2 tab(s) orally , As Needed (30 Jun 2019 22:05)  Ventolin HFA 90 mcg/inh inhalation aerosol: 2 puff(s) inhaled every 4 to 6 hours, As Needed (30 Jun 2019 22:05)  Vitamin C:  (30 Jun 2019 22:05)  ZyrTEC 10 mg oral tablet: 1 tab(s) orally once a day (30 Jun 2019 22:05)      REVIEW OF SYSTEMS:  Constitutional: [ ] negative [ ] fevers [ ] chills [ ] weight loss [ ] weight gain  HEENT: [ ] negative [ ] dry eyes [ ] eye irritation [ ] postnasal drip [ ] nasal congestion  CV: [ ] negative  [ ] chest pain [ ] orthopnea [ ] palpitations [ ] murmur  Resp: [ ] negative [ ] cough [ ] shortness of breath [ ] dyspnea [ ] wheezing [ ] sputum [ ] hemoptysis  GI: [ ] negative [ ] nausea [ ] vomiting [ ] diarrhea [ ] constipation [ ] abd pain [ ] dysphagia   : [ ] negative [ ] dysuria [ ] nocturia [ ] hematuria [ ] increased urinary frequency  Musculoskeletal: [ ] negative [ ] back pain [ ] myalgias [ ] arthralgias [ ] fracture  Skin: [ ] negative [ ] rash [ ] itch  Neurological: [ ] negative [ ] headache [ ] dizziness [ ] syncope [ ] weakness [ ] numbness  Psychiatric: [ ] negative [ ] anxiety [ ] depression  Endocrine: [ ] negative [ ] diabetes [ ] thyroid problem  Hematologic/Lymphatic: [ ] negative [ ] anemia [ ] bleeding problem  Allergic/Immunologic: [ ] negative [ ] itchy eyes [ ] nasal discharge [ ] hives [ ] angioedema  [ ] All other systems negative  [ ] Unable to assess ROS because ________    OBJECTIVE:  ICU Vital Signs Last 24 Hrs  T(C): 36.7 (16 Jul 2019 15:00), Max: 37.3 (16 Jul 2019 05:45)  T(F): 98 (16 Jul 2019 15:00), Max: 99.2 (16 Jul 2019 05:45)  HR: 119 (16 Jul 2019 19:00) (107 - 119)  BP: 121/57 (16 Jul 2019 19:00) (108/72 - 140/66)  BP(mean): 80 (16 Jul 2019 19:00) (79 - 98)  ABP: --  ABP(mean): --  RR: 45 (16 Jul 2019 19:00) (18 - 54)  SpO2: 95% (16 Jul 2019 19:00) (94% - 100%)        07-15 @ 07:01  -  07-16 @ 07:00  --------------------------------------------------------  IN: 2592 mL / OUT: 3415 mL / NET: -823 mL    07-16 @ 07:01 - 07-16 @ 19:08  --------------------------------------------------------  IN: 1471.6 mL / OUT: 620 mL / NET: 851.6 mL      CAPILLARY BLOOD GLUCOSE      POCT Blood Glucose.: 149 mg/dL (16 Jul 2019 18:40)      PHYSICAL EXAM:  General: ill appearing but not in respiratory distress  Skin: Warm and dry  Neuro: AAOx3, nonfocal  HEENT: EOMI, no oral lesions  Neck: Full range of motion, no JVD  Lungs: Mildly decreased breath sound at the bases, no wheezes or rales  Heart: Regular rate and rhythm, no murmurs  Abdomen: Normoactive bowl sounds, soft, nontender, nondistended  Extremities: No lower extremity tenderness, erythema, or edema       HOSPITAL MEDICATIONS:  Standing Meds:  ALBUTerol/ipratropium for Nebulization 3 milliLiter(s) Nebulizer every 6 hours  buDESOnide    Inhalation Suspension 0.5 milliGRAM(s) Inhalation every 12 hours  chlorhexidine 2% Cloths 1 Application(s) Topical daily  enalaprilat Injectable 2.5 milliGRAM(s) IV Push every 6 hours  enoxaparin Injectable 85 milliGRAM(s) SubCutaneous two times a day  fat emulsion (Fish Oil and Plant Based) 20% Infusion 20.8 mL/Hr IV Continuous <Continuous>  furosemide   Injectable 20 milliGRAM(s) IV Push daily  imipenem/cilastatin  IVPB 500 milliGRAM(s) IV Intermittent every 6 hours  insulin lispro (HumaLOG) corrective regimen sliding scale   SubCutaneous every 6 hours  iron sucrose IVPB 200 milliGRAM(s) IV Intermittent every 24 hours  pantoprazole  Injectable 40 milliGRAM(s) IV Push every 24 hours  Parenteral Nutrition - Adult 1 Each TPN Continuous <Continuous>  Parenteral Nutrition - Adult 1 Each TPN Continuous <Continuous>      PRN Meds:  benzocaine 15 mG/menthol 3.6 mG Lozenge 1 Lozenge Oral three times a day PRN  HYDROmorphone  Injectable 0.5 milliGRAM(s) IV Push every 3 hours PRN      LABS:                        7.3    14.4  )-----------( 279      ( 16 Jul 2019 09:06 )             21.3     Hgb Trend: 7.3<--, 7.3<--, 7.4<--, 10.3<--, 7.1<--  07-16    140  |  104  |  31<H>  ----------------------------<  151<H>  3.8   |  26  |  0.62    Ca    8.6      16 Jul 2019 09:31  Phos  6.5     07-16  Mg     2.4     07-16    TPro  6.4  /  Alb  2.3<L>  /  TBili  0.4  /  DBili  0.2  /  AST  19  /  ALT  11  /  AlkPhos  149<H>  07-16    Creatinine Trend: 0.62<--, <0.30<--, 0.57<--, 0.64<--, 0.78<--, 0.82<--      Arterial Blood Gas:  07-16 @ 10:18  7.46/38/99/26/99/2.7  ABG lactate: --      MICROBIOLOGY:     RADIOLOGY:  < from: CT Angio Chest w/ IV Cont (07.16.19 @ 17:06) >  INTERPRETATION:  No pulmonary embolus in the main, left and right, and   lobar pulmonary arteries. Limited evaluation of the segmental and   subsegmental pulmonary arteries secondary to suboptimal contrast bolus   timing and motion artifact.    Trace rightpleural effusion. Small debris in the trachea and bilateral   mainstem bronchi. Unchanged left upper lobe 6 cm pulmonary nodule.   Unchanged atelectasis involving the right middle lobe. Trace bilateral   linear atelectasis.    Large lower anterior abdominal wall wound. Small volume abdominal free   fluid. No well-formed fluid collections.    Mildly dilated loops of small bowel subjacent to the lower anterior   abdominal wall wound, without a discrete transition point.    Tiny focus of vesicular intraluminal air, possibly related to recent   instrumentation.    Follow-up official report in the AM.      < end of copied text >  < from: CT Angio Chest w/ IV Cont (07.16.19 @ 17:06) >  NTERPRETATION:  No pulmonary embolus in the main, left and right, and   lobar pulmonary arteries. Limited evaluation of the segmental and   subsegmental pulmonary arteries secondary to suboptimal contrast bolus   timing and motion artifact.    Trace rightpleural effusion. Small debris in the trachea and bilateral   mainstem bronchi. Unchanged left upper lobe 6 cm pulmonary nodule.   Unchanged atelectasis involving the right middle lobe. Trace bilateral   linear atelectasis.    Large lower anterior abdominal wall wound. Small volume abdominal free   fluid. No well-formed fluid collections.    Mildly dilated loops of small bowel subjacent to the lower anterior   abdominal wall wound, without a discrete transition point.    Tiny focus of vesicular intraluminal air, possibly related to recent   instrumentation.    Follow-up official report in the AM.    < end of copied text >

## 2019-07-16 NOTE — PROGRESS NOTE ADULT - ATTENDING COMMENTS
Hungry, still minimal bowel function  1.  Protein calorie malnutrition--tolerating goal TPN.  Trend nutritional parameters.  2.  Hyperglycemia--modest insulin adjustment  3.  Hyperkalemia--decrease K content

## 2019-07-16 NOTE — PROGRESS NOTE ADULT - ASSESSMENT
74 year old female PMHx of HFrEF s/p AICD, asthma, HTN, HLD, atrial fibrillation on eliquis who presented to [facility] on 6/30 with diverticulitis at splenic flexure with perforation on repeat scan on 7/1.  On 7/2 patient underwent Exploratory laparotomy, partial colectomy, end colostomy, and placement of temporary abdominal VAC dressing. Intraoperatively was noted of having perforation of distal transverse colon with feculent peritonitis. On 7/3 patient underwent Reopening of abdominal wound and an abdominal washout. On 7/9 patient RTOR for Abthera Vac placement and was found to have viable Edematous bowel and minimal pus-like fluid in pelvis which was sent for culture. On 7/10 hypotensive to SBP in the 80's and broadened to Imipenem. Wound cultures from 7/2/19 with E. coli, AHS, Strep mitis and Enterococcus faecalis (amp susceptible).    Ciprofloxacin 6/30 - 7/1  Aztreonam 7/2 - 7/4  Flagyl 6/30 - 7/9  Ceftriaxone 7/4 - 7/9  Imipenem 7/10 -->>    Overall, feculent peritonitis and resolved septic shock secondary to perforated diverticulitis. 7/9 surgical cultures with enterococcus. Imipenem offers coverage for ampicillin susceptible enterococcus   CoNS noted on 7/9 surgical cultures - suspect it is possible contaminant  7/10 blood cultures negative  7/11 RTOR with no evidence of purulence/infection noted  More cough in last 24 hours but CXR clear - ?viral process    #Perforated Diverticulitis  --Continue imipenem 500 mg IV Q6H    #Cough/Sore throat (likely related to viral process - CXR from 7/14 clear)  --Recommend RVP     #Leukocytosis (improving)  --Continue to trend    I will continue to follow. Please feel free to contact me with any further questions.    Rex Goode M.D.  [facility] Division of Infectious Disease  8AM-5PM: Pager Number [phone]  After Hours: Please contact the Infectious Diseases Office at [phone]

## 2019-07-16 NOTE — PROGRESS NOTE ADULT - ASSESSMENT
A/P:74F admitted with abdominal pain and exam findings consistent with peritonitis secondary to acute diverticulitis at the splenic flexure with evidence of contained perforation now s/p Exploratory laparotomy, left hemicolectomy, end colostomy, and ABThera Vac placement and s/p from RTOR for abdominal washout and Abthera vac placement, off pressors. TPN started 7/5 for Protein-Calorie Malnutrition    - TPN formula @ goal: Carbohydrates: 210grams, Amino Acid: 120grams, 40g lipids in 2000 ml  - Strict Intake and Output.,   - Weights three times a week  - Monitor BMP, Mg, Ionized Ca, Phosphorus daily  - Pre-albumin weekly.  - Hyperglycemia- will increase insulin from 55U to 60U today.  Continue sliding scale coverage.   - Hyperkalemia - KCl decreased from 100 meq to 60 meq today.  - HyperNa-stabilized;  No NaAce or NaCl in TPN  - Anemia- pt is a Jehovah Witness- continue mngt per primary team  - Continue as per SICU/ Surgery d/w team, will follow with you    TPN team, pager [phone], spectra [phone]  D/w Adela Cross

## 2019-07-16 NOTE — CONSULT NOTE ADULT - SUBJECTIVE AND OBJECTIVE BOX
HPI:  Patient is a 75 y/o F (Latter day) w/ HFrEF s/p CRT-D, Asthma, HTN, HLD, and A-fib on Eliquis who initially p/w severe, sudden onset L-sided abdominal pain and was  found to have a proximal descending colon/splenic flexure acute diverticulitis with evidence of contained perforation s/p exploratory laparotomy, left hemicolectomy, end colostomy, and ABThera VAC placement on 7/1, s/p abdominal washout x3 (last 7/9) and return to OR on 7/11 for abdominal closure and Vac placement w/ hospital course c/b septic shock 2/2 perforated diverticulitis/feculent peritonitis, protein-calorie malnutrition requiring TPN, and new anemia. Patient was transferred to the SICU overnight last night for respiratory distress and hypoxemia. Patient developed an anemia during this hospitalization. Hematology was consulted for further evaluation. Patient's Hgb was WNL on day of presentation (14.4) and it began to steadily decline afterward. Her Hgb has remained ~ 7-8 since 7/4. Patient was started on IV Sucrose empirically on 7/13. She is currently receiving IV Primaxin for perforated diverticulitis/feculent peritonitis and is on TPN for protein-calorie malnutrition. Labs have been obtained during hospitalization with pediatric vials.     Patient seen and examined. Patient's daughter was at bedside. Patient was slightly somnolent during encounter and only able to answer a few questions; however, she was able to identify her name, location, and time. She denies any worsening chest pain or abdominal pain currently. Her shortness of breath is improved compared to yesterday. No overt signs of bleeding has been seen. Per d/w patient's daughter, patient would be comfortable receiving Epogen and Albumin if they were required.      PAST MEDICAL & SURGICAL HISTORY:  Refusal of blood transfusions as patient is Judaism  Patient is Judaism  Vertigo  Atrial flutter  Diverticulitis  Cardiomyopathy  Kidney stone  Cardiac Pacemaker  HTN - Hypertension  Gout  Diabetes  Congestive Heart Failure  Asthma  S/P cholecystectomy  AICD (Automatic Cardioverter/Defibrillator) Present: inserted in Aug, 2008. Due for battery change in 1 month. ( [employer]) . Inserted by Dr Duffy      Review of Systems:   CONSTITUTIONAL: + Fatigue, No fever  EYES: No eye pain or discharge  ENMT: No sinus or throat pain  NECK: No pain or stiffness  RESPIRATORY: + Shortness of breath (improved), + Mild cough  CARDIOVASCULAR: No chest pain or palpitations  GASTROINTESTINAL: + Abdominal pain, No vomiting or diarrhea  GENITOURINARY: No dysuria or hematuria  NEUROLOGICAL: No headaches or syncope  SKIN: No itching or rash   MUSCULOSKELETAL: No joint pain or or joint edema    Allergies    Coreg (Other)  digoxin (Other; Short breath (Mild to Mod))  Lopressor (Short breath)  penicillins (Hives)    Intolerances    metoprolol (Other)  Solu-Medrol (Other (Mild to Mod))      Social History: No EtOH, smoking, or illicit drug use. Lives at home with her daughter. Currently retired (had worked for a [employer] company in the past)    FAMILY HISTORY:  Family history of brain tumor      MEDICATIONS  (STANDING):  ALBUTerol/ipratropium for Nebulization 3 milliLiter(s) Nebulizer every 6 hours  buDESOnide    Inhalation Suspension 0.5 milliGRAM(s) Inhalation every 12 hours  calcium gluconate IVPB 2 Gram(s) IV Intermittent once  chlorhexidine 2% Cloths 1 Application(s) Topical daily  enalaprilat Injectable 2.5 milliGRAM(s) IV Push every 6 hours  enoxaparin Injectable 85 milliGRAM(s) SubCutaneous two times a day  fat emulsion (Fish Oil and Plant Based) 20% Infusion 20.8 mL/Hr (20.8 mL/Hr) IV Continuous <Continuous>  furosemide   Injectable 20 milliGRAM(s) IV Push daily  imipenem/cilastatin  IVPB 500 milliGRAM(s) IV Intermittent every 6 hours  insulin lispro (HumaLOG) corrective regimen sliding scale   SubCutaneous every 6 hours  iron sucrose IVPB 200 milliGRAM(s) IV Intermittent every 24 hours  pantoprazole  Injectable 40 milliGRAM(s) IV Push every 24 hours  Parenteral Nutrition - Adult 1 Each (83 mL/Hr) TPN Continuous <Continuous>  Parenteral Nutrition - Adult 1 Each (83 mL/Hr) TPN Continuous <Continuous>    MEDICATIONS  (PRN):  benzocaine 15 mG/menthol 3.6 mG Lozenge 1 Lozenge Oral three times a day PRN Sore Throat  HYDROmorphone  Injectable 0.5 milliGRAM(s) IV Push every 3 hours PRN pain        CAPILLARY BLOOD GLUCOSE      POCT Blood Glucose.: 148 mg/dL (16 Jul 2019 13:14)  POCT Blood Glucose.: 119 mg/dL (16 Jul 2019 05:27)  POCT Blood Glucose.: 123 mg/dL (16 Jul 2019 00:11)  POCT Blood Glucose.: 141 mg/dL (15 Jul 2019 18:15)    I&O's Summary    15 Jul 2019 07:01  -  16 Jul 2019 07:00  --------------------------------------------------------  IN: 2592 mL / OUT: 3415 mL / NET: -823 mL    16 Jul 2019 07:01  -  16 Jul 2019 17:01  --------------------------------------------------------  IN: 1181 mL / OUT: 60 mL / NET: 1121 mL    Vital Signs Last 24 Hrs  T(C): 36.7 (16 Jul 2019 15:00), Max: 37.3 (16 Jul 2019 05:45)  T(F): 98 (16 Jul 2019 15:00), Max: 99.2 (16 Jul 2019 05:45)  HR: 112 (16 Jul 2019 16:00) (107 - 114)  BP: 140/66 (16 Jul 2019 16:00) (108/72 - 140/66)  BP(mean): 95 (16 Jul 2019 16:00) (81 - 95)  RR: 29 (16 Jul 2019 16:00) (18 - 54)  SpO2: 94% (16 Jul 2019 16:00) (94% - 100%)    PHYSICAL EXAM:  GENERAL: NAD, Frail  HEENT: NC/AT, Slightly dry mucous membranes  NECK: Supple  CHEST/LUNG: Slightly coarse bilaterally (improved with coughing), Respirations grossly nonlabored  HEART: Slightly tachycardic, +S1/S2  ABDOMEN: + Wound vac in place, + RLQ ostomy w/ minimal stool output  EXTREMITIES: + B/L LE edema  NEUROLOGY: A+Ox3, Sleepy, but answers few questions and follows commands  SKIN: + Abdominal wound vac, Warm and dry  PSYCH: Calm    LABS:                        7.3    14.4  )-----------( 279      ( 16 Jul 2019 09:06 )             21.3     07-16    140  |  104  |  31<H>  ----------------------------<  151<H>  3.8   |  26  |  0.62    Ca    8.6      16 Jul 2019 09:31  Phos  6.5     07-16  Mg     2.4     07-16    TPro  6.4  /  Alb  2.3<L>  /  TBili  0.4  /  DBili  0.2  /  AST  19  /  ALT  11  /  AlkPhos  149<H>  07-16              RADIOLOGY & ADDITIONAL TESTS:  Studies reviewed. HPI:  Patient is a 75 y/o F ([employer]) w/ HFrEF s/p CRT-D, Asthma, HTN, HLD, and A-fib on Eliquis who initially p/w severe, sudden onset L-sided abdominal pain and was  found to have a proximal descending colon/splenic flexure acute diverticulitis with evidence of contained perforation s/p exploratory laparotomy, left hemicolectomy, end colostomy, and ABThera VAC placement on 7/1, s/p abdominal washout x3 (last 7/9) and return to OR on 7/11 for abdominal closure and Vac placement w/ hospital course c/b septic shock 2/2 perforated diverticulitis/feculent peritonitis, protein-calorie malnutrition requiring TPN, and new anemia. Patient was transferred to the SICU overnight last night for respiratory distress and hypoxemia. Patient developed an anemia during this hospitalization. Hematology was consulted for further evaluation. Patient's Hgb was WNL on day of presentation (14.4) and it began to steadily decline afterward. Her Hgb has remained ~ 7-8 since 7/4. Patient was started on IV Sucrose empirically on 7/13. She is currently receiving IV Primaxin for perforated diverticulitis/feculent peritonitis and is on TPN for protein-calorie malnutrition. Labs have been obtained during hospitalization with pediatric vials.     Patient seen and examined. Patient's daughter was at bedside. Patient was slightly somnolent during encounter and only able to answer a few questions; however, she was able to identify her name, location, and time. She denies any worsening chest pain or abdominal pain currently. Her shortness of breath is improved compared to yesterday. No overt signs of bleeding has been seen. Per d/w patient's daughter, patient would accept receiving Epogen.      PAST MEDICAL & SURGICAL HISTORY:  Refusal of blood transfusions as patient is Amish  Patient is Amish  Vertigo  Atrial flutter  Diverticulitis  Cardiomyopathy  Kidney stone  Cardiac Pacemaker  HTN - Hypertension  Gout  Diabetes  Congestive Heart Failure  Asthma  S/P cholecystectomy  AICD (Automatic Cardioverter/Defibrillator) Present: inserted in Aug, 2008. Due for battery change in 1 month. ( [employer]) . Inserted by Dr Duffy      Review of Systems:   CONSTITUTIONAL: + Fatigue, No fever  EYES: No eye pain or discharge  ENMT: No sinus or throat pain  NECK: No pain or stiffness  RESPIRATORY: + Shortness of breath (improved), + Mild cough  CARDIOVASCULAR: No chest pain or palpitations  GASTROINTESTINAL: + Abdominal pain, No vomiting or diarrhea  GENITOURINARY: No dysuria or hematuria  NEUROLOGICAL: No headaches or syncope  SKIN: No itching or rash   MUSCULOSKELETAL: No joint pain or or joint edema    Allergies    Coreg (Other)  digoxin (Other; Short breath (Mild to Mod))  Lopressor (Short breath)  penicillins (Hives)    Intolerances    metoprolol (Other)  Solu-Medrol (Other (Mild to Mod))      Social History: No EtOH, smoking, or illicit drug use. Lives at home with her daughter. Currently retired (had worked for a [employer] company in the past)    FAMILY HISTORY:  Family history of brain tumor      MEDICATIONS  (STANDING):  ALBUTerol/ipratropium for Nebulization 3 milliLiter(s) Nebulizer every 6 hours  buDESOnide    Inhalation Suspension 0.5 milliGRAM(s) Inhalation every 12 hours  calcium gluconate IVPB 2 Gram(s) IV Intermittent once  chlorhexidine 2% Cloths 1 Application(s) Topical daily  enalaprilat Injectable 2.5 milliGRAM(s) IV Push every 6 hours  enoxaparin Injectable 85 milliGRAM(s) SubCutaneous two times a day  fat emulsion (Fish Oil and Plant Based) 20% Infusion 20.8 mL/Hr (20.8 mL/Hr) IV Continuous <Continuous>  furosemide   Injectable 20 milliGRAM(s) IV Push daily  imipenem/cilastatin  IVPB 500 milliGRAM(s) IV Intermittent every 6 hours  insulin lispro (HumaLOG) corrective regimen sliding scale   SubCutaneous every 6 hours  iron sucrose IVPB 200 milliGRAM(s) IV Intermittent every 24 hours  pantoprazole  Injectable 40 milliGRAM(s) IV Push every 24 hours  Parenteral Nutrition - Adult 1 Each (83 mL/Hr) TPN Continuous <Continuous>  Parenteral Nutrition - Adult 1 Each (83 mL/Hr) TPN Continuous <Continuous>    MEDICATIONS  (PRN):  benzocaine 15 mG/menthol 3.6 mG Lozenge 1 Lozenge Oral three times a day PRN Sore Throat  HYDROmorphone  Injectable 0.5 milliGRAM(s) IV Push every 3 hours PRN pain        CAPILLARY BLOOD GLUCOSE      POCT Blood Glucose.: 148 mg/dL (16 Jul 2019 13:14)  POCT Blood Glucose.: 119 mg/dL (16 Jul 2019 05:27)  POCT Blood Glucose.: 123 mg/dL (16 Jul 2019 00:11)  POCT Blood Glucose.: 141 mg/dL (15 Jul 2019 18:15)    I&O's Summary    15 Jul 2019 07:01  -  16 Jul 2019 07:00  --------------------------------------------------------  IN: 2592 mL / OUT: 3415 mL / NET: -823 mL    16 Jul 2019 07:01  -  16 Jul 2019 17:01  --------------------------------------------------------  IN: 1181 mL / OUT: 60 mL / NET: 1121 mL    Vital Signs Last 24 Hrs  T(C): 36.7 (16 Jul 2019 15:00), Max: 37.3 (16 Jul 2019 05:45)  T(F): 98 (16 Jul 2019 15:00), Max: 99.2 (16 Jul 2019 05:45)  HR: 112 (16 Jul 2019 16:00) (107 - 114)  BP: 140/66 (16 Jul 2019 16:00) (108/72 - 140/66)  BP(mean): 95 (16 Jul 2019 16:00) (81 - 95)  RR: 29 (16 Jul 2019 16:00) (18 - 54)  SpO2: 94% (16 Jul 2019 16:00) (94% - 100%)    PHYSICAL EXAM:  GENERAL: NAD, Frail  HEENT: NC/AT, Slightly dry mucous membranes  NECK: Supple  CHEST/LUNG: Slightly coarse bilaterally (improved with coughing), Respirations grossly nonlabored  HEART: Slightly tachycardic, +S1/S2  ABDOMEN: + Wound vac in place, + RLQ ostomy w/ minimal stool output  EXTREMITIES: + B/L LE edema  NEUROLOGY: A+Ox3, Sleepy, but answers few questions and follows commands  SKIN: + Abdominal wound vac, Warm and dry  PSYCH: Calm    LABS:                        7.3    14.4  )-----------( 279      ( 16 Jul 2019 09:06 )             21.3     07-16    140  |  104  |  31<H>  ----------------------------<  151<H>  3.8   |  26  |  0.62    Ca    8.6      16 Jul 2019 09:31  Phos  6.5     07-16  Mg     2.4     07-16    TPro  6.4  /  Alb  2.3<L>  /  TBili  0.4  /  DBili  0.2  /  AST  19  /  ALT  11  /  AlkPhos  149<H>  07-16              RADIOLOGY & ADDITIONAL TESTS:  Studies reviewed.

## 2019-07-16 NOTE — PROGRESS NOTE ADULT - SUBJECTIVE AND OBJECTIVE BOX
[facility] NUTRITION SUPPORT / TPN -- FOLLOW UP NOTE  --------------------------------------------------------------------------------    24 hour events/subjective:  Pt c/o hunger- but still w/o bowel function noted  No N/v/d or f/c/s  (+)c/o sob and cough w/ increased secretions w/o paplitations or CP  Patient with increased work of breathing and productive cough overnight, hypoxic requiring supplemental O2.  Feels better this am but still weak with cough.      Diet:  Diet, NPO:   Except Medications (07-09-19 @ 14:09)      Appetite: [  ]Poor [ x ]Adequate [  ]Good  Caloric intake:  [  x ]  Adequate   [   ] Inadequate    ROS: General/ GI see HPI  all other systems negative    ALLERGIES & MEDICATIONS  --------------------------------------------------------------------------------  ALLERGIES  Coreg (Other)  digoxin (Other; Short breath (Mild to Mod))  Lopressor (Short breath)  penicillins (Hives)    INTOLERANCES  metoprolol (Other)  Solu-Medrol (Other (Mild to Mod))      STANDING INPATIENT MEDICATIONS    ALBUTerol/ipratropium for Nebulization 3 milliLiter(s) Nebulizer every 6 hours  buDESOnide    Inhalation Suspension 0.5 milliGRAM(s) Inhalation every 12 hours  calcium gluconate IVPB 2 Gram(s) IV Intermittent once  chlorhexidine 2% Cloths 1 Application(s) Topical daily  enalaprilat Injectable 2.5 milliGRAM(s) IV Push every 6 hours  enoxaparin Injectable 85 milliGRAM(s) SubCutaneous two times a day  fat emulsion (Fish Oil and Plant Based) 20% Infusion 20.8 mL/Hr IV Continuous <Continuous>  furosemide   Injectable 20 milliGRAM(s) IV Push daily  imipenem/cilastatin  IVPB 500 milliGRAM(s) IV Intermittent every 6 hours  insulin lispro (HumaLOG) corrective regimen sliding scale   SubCutaneous every 6 hours  iron sucrose IVPB 200 milliGRAM(s) IV Intermittent every 24 hours  pantoprazole  Injectable 40 milliGRAM(s) IV Push every 24 hours  Parenteral Nutrition - Adult 1 Each TPN Continuous <Continuous>  Parenteral Nutrition - Adult 1 Each TPN Continuous <Continuous>      PRN INPATIENT MEDICATION  benzocaine 15 mG/menthol 3.6 mG Lozenge 1 Lozenge Oral three times a day PRN  HYDROmorphone  Injectable 0.5 milliGRAM(s) IV Push every 3 hours PRN        VITALS/PHYSICAL EXAM  --------------------------------------------------------------------------------  T(C): 36.9 (07-16-19 @ 09:45), Max: 37.3 (07-16-19 @ 05:45)  HR: 109 (07-16-19 @ 13:00) (106 - 114)  BP: 130/67 (07-16-19 @ 13:00) (108/72 - 144/75)  RR: 54 (07-16-19 @ 13:00) (18 - 54)  SpO2: 99% (07-16-19 @ 13:00) (96% - 100%)  Wt(kg): --        07-15-19 @ 07:01  -  07-16-19 @ 07:00  --------------------------------------------------------  IN: 2592 mL / OUT: 3415 mL / NET: -823 mL    07-16-19 @ 07:01  -  07-16-19 @ 14:00  --------------------------------------------------------  IN: 932 mL / OUT: 10 mL / NET: 922 mL    Physical Exam:    	Gen: NAD, NGT in place, sleepy but arousable  	HEENT: NC/AT, PERRL, mucosa moist, supple neck, clear oropharynx  	Chest: clear  	GI: +softly distended, nontender, no BS                  (+)Ostomy pink & viable.  No gas or stool                  VAC intact w/ good seal              :  (+)Ching              MSK/Vascular: Passive ROM x4,  no clubbing, cyanosis, nor edema                   RUE PICC dressing C/D/I w/o SOI  	Neuro: Awake and alert  	Skin: Warm, good turgor, without rashes                   LABS/ CULTURES/ RADIOLOGY:              7.3    14.4  >-----------<  279      [07-16-19 @ 09:06]              21.3     140  |  104  |  31  ----------------------------<  151      [07-16-19 @ 09:31]  3.8   |  26  |  0.62        Ca     8.6     [07-16-19 @ 09:31]      iCa    0.80     [07-16 @ 07:26]      Mg     2.4     [07-16-19 @ 07:33]      Phos  6.5     [07-16-19 @ 07:33]    TPro  6.4  /  Alb  2.3  /  TBili  0.4  /  DBili  0.2  /  AST  19  /  ALT  11  /  AlkPhos  149  [07-16-19 @ 09:31]    LDH >2250      [07-16-19 @ 07:33]    Blood Gas Arterial - Calcium, Ionized: 1.19 mmoL/L (07-16-19 @ 10:18)    CAPILLARY BLOOD GLUCOSE  POCT Blood Glucose.: 148 mg/dL (16 Jul 2019 13:14)  POCT Blood Glucose.: 119 mg/dL (16 Jul 2019 05:27)  POCT Blood Glucose.: 123 mg/dL (16 Jul 2019 00:11)  POCT Blood Glucose.: 141 mg/dL (15 Jul 2019 18:15)    Prealbumin, Serum: 5 mg/dL (07-04-19 @ 22:45)    < from: Xray Chest 1 View- PORTABLE-Urgent (07.16.19 @ 09:58) >  Impression:    The heart is normal in size. The lungs are clear. NG tube is in the    stomach however its tip is not seen on the current study. A pacer is in   good position. No change in the appear of the chest when compared to   previous study done July 14, 2019.

## 2019-07-17 ENCOUNTER — APPOINTMENT (OUTPATIENT)
Dept: CARDIOLOGY | Facility: CLINIC | Age: 74
End: 2019-07-17

## 2019-07-17 LAB
ANION GAP SERPL CALC-SCNC: 11 MMOL/L — SIGNIFICANT CHANGE UP (ref 5–17)
BUN SERPL-MCNC: 28 MG/DL — HIGH (ref 7–23)
CA-I BLD-SCNC: 1.2 MMOL/L — SIGNIFICANT CHANGE UP (ref 1.12–1.3)
CALCIUM SERPL-MCNC: 8.4 MG/DL — SIGNIFICANT CHANGE UP (ref 8.4–10.5)
CHLORIDE SERPL-SCNC: 105 MMOL/L — SIGNIFICANT CHANGE UP (ref 96–108)
CO2 SERPL-SCNC: 25 MMOL/L — SIGNIFICANT CHANGE UP (ref 22–31)
CREAT SERPL-MCNC: 0.63 MG/DL — SIGNIFICANT CHANGE UP (ref 0.5–1.3)
EPO SERPL-MCNC: 54.4 MIU/ML — HIGH (ref 2.6–18.5)
GLUCOSE BLDC GLUCOMTR-MCNC: 137 MG/DL — HIGH (ref 70–99)
GLUCOSE BLDC GLUCOMTR-MCNC: 146 MG/DL — HIGH (ref 70–99)
GLUCOSE BLDC GLUCOMTR-MCNC: 149 MG/DL — HIGH (ref 70–99)
GLUCOSE BLDC GLUCOMTR-MCNC: 154 MG/DL — HIGH (ref 70–99)
GLUCOSE BLDC GLUCOMTR-MCNC: 156 MG/DL — HIGH (ref 70–99)
GLUCOSE BLDC GLUCOMTR-MCNC: >600 MG/DL — CRITICAL HIGH (ref 70–99)
GLUCOSE SERPL-MCNC: 145 MG/DL — HIGH (ref 70–99)
MAGNESIUM SERPL-MCNC: 1.6 MG/DL — SIGNIFICANT CHANGE UP (ref 1.6–2.6)
MAGNESIUM SERPL-MCNC: 2.2 MG/DL — SIGNIFICANT CHANGE UP (ref 1.6–2.6)
PHOSPHATE SERPL-MCNC: 3.1 MG/DL — SIGNIFICANT CHANGE UP (ref 2.5–4.5)
POTASSIUM SERPL-MCNC: 4.8 MMOL/L — SIGNIFICANT CHANGE UP (ref 3.5–5.3)
POTASSIUM SERPL-SCNC: 4.8 MMOL/L — SIGNIFICANT CHANGE UP (ref 3.5–5.3)
RBC # BLD: 2.27 M/UL — LOW (ref 3.8–5.2)
RETICS #: 325 K/UL — HIGH (ref 25–125)
RETICS/RBC NFR: 14.4 % — HIGH (ref 0.5–2.5)
SODIUM SERPL-SCNC: 141 MMOL/L — SIGNIFICANT CHANGE UP (ref 135–145)

## 2019-07-17 PROCEDURE — 99232 SBSQ HOSP IP/OBS MODERATE 35: CPT

## 2019-07-17 PROCEDURE — 99233 SBSQ HOSP IP/OBS HIGH 50: CPT

## 2019-07-17 RX ORDER — IMIPENEM AND CILASTATIN 250; 250 MG/100ML; MG/100ML
INJECTION, POWDER, FOR SOLUTION INTRAVENOUS
Refills: 0 | Status: COMPLETED | OUTPATIENT
Start: 2019-07-17 | End: 2019-07-21

## 2019-07-17 RX ORDER — HYDROMORPHONE HYDROCHLORIDE 2 MG/ML
0.25 INJECTION INTRAMUSCULAR; INTRAVENOUS; SUBCUTANEOUS ONCE
Refills: 0 | Status: DISCONTINUED | OUTPATIENT
Start: 2019-07-17 | End: 2019-07-17

## 2019-07-17 RX ORDER — I.V. FAT EMULSION 20 G/100ML
20.8 EMULSION INTRAVENOUS
Qty: 50 | Refills: 0 | Status: DISCONTINUED | OUTPATIENT
Start: 2019-07-17 | End: 2019-07-18

## 2019-07-17 RX ORDER — ACETAMINOPHEN 500 MG
1000 TABLET ORAL ONCE
Refills: 0 | Status: COMPLETED | OUTPATIENT
Start: 2019-07-17 | End: 2019-07-17

## 2019-07-17 RX ORDER — ERYTHROPOIETIN 10000 [IU]/ML
20000 INJECTION, SOLUTION INTRAVENOUS; SUBCUTANEOUS
Refills: 0 | Status: COMPLETED | OUTPATIENT
Start: 2019-07-17 | End: 2019-07-21

## 2019-07-17 RX ORDER — MAGNESIUM SULFATE 500 MG/ML
2 VIAL (ML) INJECTION
Refills: 0 | Status: COMPLETED | OUTPATIENT
Start: 2019-07-17 | End: 2019-07-17

## 2019-07-17 RX ORDER — METOPROLOL TARTRATE 50 MG
5 TABLET ORAL EVERY 6 HOURS
Refills: 0 | Status: DISCONTINUED | OUTPATIENT
Start: 2019-07-17 | End: 2019-07-17

## 2019-07-17 RX ORDER — IMIPENEM AND CILASTATIN 250; 250 MG/100ML; MG/100ML
500 INJECTION, POWDER, FOR SOLUTION INTRAVENOUS ONCE
Refills: 0 | Status: COMPLETED | OUTPATIENT
Start: 2019-07-17 | End: 2019-07-17

## 2019-07-17 RX ORDER — ERYTHROPOIETIN 10000 [IU]/ML
20000 INJECTION, SOLUTION INTRAVENOUS; SUBCUTANEOUS ONCE
Refills: 0 | Status: DISCONTINUED | OUTPATIENT
Start: 2019-07-17 | End: 2019-07-17

## 2019-07-17 RX ORDER — ELECTROLYTE SOLUTION,INJ
1 VIAL (ML) INTRAVENOUS
Refills: 0 | Status: DISCONTINUED | OUTPATIENT
Start: 2019-07-17 | End: 2019-07-17

## 2019-07-17 RX ORDER — MAGNESIUM SULFATE 500 MG/ML
1 VIAL (ML) INJECTION ONCE
Refills: 0 | Status: COMPLETED | OUTPATIENT
Start: 2019-07-17 | End: 2019-07-17

## 2019-07-17 RX ORDER — IMIPENEM AND CILASTATIN 250; 250 MG/100ML; MG/100ML
500 INJECTION, POWDER, FOR SOLUTION INTRAVENOUS EVERY 6 HOURS
Refills: 0 | Status: COMPLETED | OUTPATIENT
Start: 2019-07-18 | End: 2019-07-21

## 2019-07-17 RX ORDER — MAGNESIUM SULFATE 500 MG/ML
2 VIAL (ML) INJECTION ONCE
Refills: 0 | Status: COMPLETED | OUTPATIENT
Start: 2019-07-17 | End: 2019-07-17

## 2019-07-17 RX ADMIN — PANTOPRAZOLE SODIUM 40 MILLIGRAM(S): 20 TABLET, DELAYED RELEASE ORAL at 05:21

## 2019-07-17 RX ADMIN — IMIPENEM AND CILASTATIN 100 MILLIGRAM(S): 250; 250 INJECTION, POWDER, FOR SOLUTION INTRAVENOUS at 17:50

## 2019-07-17 RX ADMIN — Medication 1 EACH: at 20:17

## 2019-07-17 RX ADMIN — Medication 0.5 MILLIGRAM(S): at 17:13

## 2019-07-17 RX ADMIN — ENOXAPARIN SODIUM 85 MILLIGRAM(S): 100 INJECTION SUBCUTANEOUS at 05:22

## 2019-07-17 RX ADMIN — Medication 2: at 23:49

## 2019-07-17 RX ADMIN — IRON SUCROSE 110 MILLIGRAM(S): 20 INJECTION, SOLUTION INTRAVENOUS at 20:16

## 2019-07-17 RX ADMIN — Medication 3 MILLILITER(S): at 11:57

## 2019-07-17 RX ADMIN — Medication 50 GRAM(S): at 05:21

## 2019-07-17 RX ADMIN — ERYTHROPOIETIN 20000 UNIT(S): 10000 INJECTION, SOLUTION INTRAVENOUS; SUBCUTANEOUS at 22:34

## 2019-07-17 RX ADMIN — Medication 100 GRAM(S): at 20:17

## 2019-07-17 RX ADMIN — Medication 0.5 MILLIGRAM(S): at 05:12

## 2019-07-17 RX ADMIN — Medication 2.5 MILLIGRAM(S): at 23:43

## 2019-07-17 RX ADMIN — Medication 5 MILLIGRAM(S): at 05:21

## 2019-07-17 RX ADMIN — Medication 20 MILLIGRAM(S): at 05:21

## 2019-07-17 RX ADMIN — Medication 50 GRAM(S): at 06:29

## 2019-07-17 RX ADMIN — Medication 2.5 MILLIGRAM(S): at 05:22

## 2019-07-17 RX ADMIN — ENOXAPARIN SODIUM 85 MILLIGRAM(S): 100 INJECTION SUBCUTANEOUS at 17:50

## 2019-07-17 RX ADMIN — Medication 1 EACH: at 18:22

## 2019-07-17 RX ADMIN — Medication 3 MILLILITER(S): at 05:12

## 2019-07-17 RX ADMIN — Medication 2: at 12:07

## 2019-07-17 RX ADMIN — Medication 400 MILLIGRAM(S): at 10:14

## 2019-07-17 RX ADMIN — Medication 2.5 MILLIGRAM(S): at 12:07

## 2019-07-17 RX ADMIN — HYDROMORPHONE HYDROCHLORIDE 0.25 MILLIGRAM(S): 2 INJECTION INTRAMUSCULAR; INTRAVENOUS; SUBCUTANEOUS at 10:14

## 2019-07-17 RX ADMIN — IMIPENEM AND CILASTATIN 100 MILLIGRAM(S): 250; 250 INJECTION, POWDER, FOR SOLUTION INTRAVENOUS at 23:49

## 2019-07-17 RX ADMIN — Medication 2.5 MILLIGRAM(S): at 17:49

## 2019-07-17 RX ADMIN — CHLORHEXIDINE GLUCONATE 1 APPLICATION(S): 213 SOLUTION TOPICAL at 05:23

## 2019-07-17 RX ADMIN — Medication 3 MILLILITER(S): at 17:13

## 2019-07-17 RX ADMIN — I.V. FAT EMULSION 20.8 ML/HR: 20 EMULSION INTRAVENOUS at 18:22

## 2019-07-17 RX ADMIN — Medication 50 GRAM(S): at 20:16

## 2019-07-17 NOTE — PROGRESS NOTE ADULT - SUBJECTIVE AND OBJECTIVE BOX
Follow Up:  Perforated Diverticulitis    Interval History: No chills or fevers. COugh is improved. Abdominal pain is improved. More output into the ostomy bag.     REVIEW OF SYSTEMS  [  ] ROS unobtainable because:    [  ] All other systems negative except as noted below:	    Constitutional:  [ ] fever [ ] chills  [ ] weight loss  [ ] weakness  Skin:  [ ] rash [ ] phlebitis	  Eyes: [ ] icterus [ ] pain  [ ] discharge	  ENMT: [ ] sore throat  [ ] thrush [ ] ulcers [ ] exudates  Respiratory: [ ] dyspnea [ ] hemoptysis [x ] cough [ ] sputum	  Cardiovascular:  [ ] chest pain [ ] palpitations [ ] edema	  Gastrointestinal:  [ ] nausea [ ] vomiting [ ] diarrhea [ ] constipation [x ] pain	  Genitourinary:  [ ] dysuria [ ] frequency [ ] hematuria [ ] discharge [ ] flank pain  [ ] incontinence  Musculoskeletal:  [ ] myalgias [ ] arthralgias [ ] arthritis  [ ] back pain  Neurological:  [ ] headache [ ] seizures  [ ] confusion/altered mental status  Psychiatric:  [ ] anxiety [ ] depression	  Hematology/Lymphatics:  [ ] lymphadenopathy  Endocrine:  [ ] adrenal [ ] thyroid  Allergic/Immunologic:	 [ ] transplant [ ] seasonal    Allergies  Coreg (Other)  digoxin (Other; Short breath (Mild to Mod))  penicillins (Hives)        ANTIMICROBIALS:      OTHER MEDS:  MEDICATIONS  (STANDING):  ALBUTerol/ipratropium for Nebulization 3 every 6 hours  buDESOnide    Inhalation Suspension 0.5 every 12 hours  enalaprilat Injectable 2.5 every 6 hours  enoxaparin Injectable 85 two times a day  furosemide   Injectable 20 daily  insulin lispro (HumaLOG) corrective regimen sliding scale  every 6 hours  pantoprazole  Injectable 40 every 24 hours      Vital Signs Last 24 Hrs  T(C): 37.1 (17 Jul 2019 15:00), Max: 37.1 (17 Jul 2019 11:00)  T(F): 98.8 (17 Jul 2019 15:00), Max: 98.8 (17 Jul 2019 15:00)  HR: 108 (17 Jul 2019 16:00) (102 - 122)  BP: 129/60 (17 Jul 2019 16:00) (107/52 - 149/65)  BP(mean): 86 (17 Jul 2019 16:00) (74 - 98)  RR: 28 (17 Jul 2019 16:00) (22 - 45)  SpO2: 94% (17 Jul 2019 16:00) (91% - 99%)    PHYSICAL EXAMINATION:  General: Alert and Awake, NAD  HEENT: PERRL, EOMI, No subconjunctival hemorrhages, Oropharynx Clear, MMM  Neck: Supple, No SARAHI  Cardiac: RRR, No M/R/G  Resp: CTAB, No Wh/Rh/Ra  Abdomen: Obese, +RLQ ostomy with minimal stool output. +midline wound vac with serosanguinous drainage, +2 drains on left side of abdomen. Bottom drain with serosanguinous material draining but upper drain with purulent/sanguinous drainage (less cloudy today), NBS, ND, minimal abdominal pain today, No HSM, No rigidity or guarding  MSK: No LE edema. No stigmata of IE. No evidence of phlebitis. No evidence of synovitis.  : + starr  Skin: No rashes or lesions. Skin is warm and dry to the touch.   Neuro: Alert and Awake. CN 2-12 Grossly intact. Moves all four extremities spontaneously.  Psych: Calm, Pleasant, Cooperative                        7.3    14.4  )-----------( 279      ( 16 Jul 2019 09:06 )             21.3       07-17    141  |  105  |  28<H>  ----------------------------<  145<H>  4.8   |  25  |  0.63    Ca    8.4      17 Jul 2019 03:58  Phos  3.1     07-17  Mg     1.6     07-17    TPro  6.4  /  Alb  2.3<L>  /  TBili  0.4  /  DBili  0.2  /  AST  19  /  ALT  11  /  AlkPhos  149<H>  07-16          MICROBIOLOGY:  v  .Blood  07-10-19   No growth at 5 days.  --  --      .Blood  07-10-19   No growth at 5 days.  --  --      .Body Fluid abdominal fluid  07-09-19   Few Enterococcus faecalis  Few Coag Negative Staphylococcus  --  Enterococcus faecalis  Coag Negative Staphylococcus      .Body Fluid abdominal fluid  07-09-19   Few Enterococcus faecalis  Few Coag Negative Staphylococcus  --  Enterococcus faecalis  Coag Negative Staphylococcus      .Surgical Swab None Specimen Source:  abdomen, esw  07-02-19   Moderate Escherichia coli  Moderate Alpha hemolytic strep "Susceptibilities not performed"  Moderate Strep mitis oralis group "Susceptibilities not performed"  Growth in fluid media only Enterococcus faecalis  --  Escherichia coli  Enterococcus faecalis      .Blood  07-01-19   No growth at 5 days.  --  --      .Urine  07-01-19   No growth  --  --    Rapid RVP Result: Dayron (07-16 @ 14:44)    RADIOLOGY:    EXAM:  CT ABDOMEN AND PELVIS OC IC                        EXAM:  CT ANGIO CHEST (W)AW IC                        PROCEDURE DATE:  07/16/2019    No central or main pulmonary embolism. Evaluation of the lobar, segmental and subsegmental pulmonary arteries is precluded by streak and motion artifact.  Status post left hemicolectomy with a right lower quadrant colostomy.   Large open anterior abdominal wall containing mildly dilated loops of small bowel without discrete transition, likely ileus versus early/partial small bowel obstruction.  Small abdominopelvic ascites without walled off collection.  A stable 0.6 cm left upper lobe pulmonary nodule since March 2018.  A trace right pleural effusion. Small mucosal debris in the trachea and mainstem bronchi with a couple impacted left lower lobe bronchi.  Abnormally thickened endometrium to 1.2 cm. Recommend further evaluation with pelvic ultrasound. Follow Up:  Perforated Diverticulitis    Interval History: No chills or fevers. COugh is improved. Abdominal pain is improved. More output into the ostomy bag.     REVIEW OF SYSTEMS  [  ] ROS unobtainable because:    [ x ] All other systems negative except as noted below:	    Constitutional:  [ ] fever [ ] chills  [ ] weight loss  [ ] weakness  Skin:  [ ] rash [ ] phlebitis	  Eyes: [ ] icterus [ ] pain  [ ] discharge	  ENMT: [ ] sore throat  [ ] thrush [ ] ulcers [ ] exudates  Respiratory: [ ] dyspnea [ ] hemoptysis [x ] cough [ ] sputum	  Cardiovascular:  [ ] chest pain [ ] palpitations [ ] edema	  Gastrointestinal:  [ ] nausea [ ] vomiting [ ] diarrhea [ ] constipation [x ] pain	  Genitourinary:  [ ] dysuria [ ] frequency [ ] hematuria [ ] discharge [ ] flank pain  [ ] incontinence  Musculoskeletal:  [ ] myalgias [ ] arthralgias [ ] arthritis  [ ] back pain  Neurological:  [ ] headache [ ] seizures  [ ] confusion/altered mental status  Psychiatric:  [ ] anxiety [ ] depression	  Hematology/Lymphatics:  [ ] lymphadenopathy  Endocrine:  [ ] adrenal [ ] thyroid  Allergic/Immunologic:	 [ ] transplant [ ] seasonal    Allergies  Coreg (Other)  digoxin (Other; Short breath (Mild to Mod))  penicillins (Hives)        ANTIMICROBIALS:      OTHER MEDS:  MEDICATIONS  (STANDING):  ALBUTerol/ipratropium for Nebulization 3 every 6 hours  buDESOnide    Inhalation Suspension 0.5 every 12 hours  enalaprilat Injectable 2.5 every 6 hours  enoxaparin Injectable 85 two times a day  furosemide   Injectable 20 daily  insulin lispro (HumaLOG) corrective regimen sliding scale  every 6 hours  pantoprazole  Injectable 40 every 24 hours      Vital Signs Last 24 Hrs  T(C): 37.1 (17 Jul 2019 15:00), Max: 37.1 (17 Jul 2019 11:00)  T(F): 98.8 (17 Jul 2019 15:00), Max: 98.8 (17 Jul 2019 15:00)  HR: 108 (17 Jul 2019 16:00) (102 - 122)  BP: 129/60 (17 Jul 2019 16:00) (107/52 - 149/65)  BP(mean): 86 (17 Jul 2019 16:00) (74 - 98)  RR: 28 (17 Jul 2019 16:00) (22 - 45)  SpO2: 94% (17 Jul 2019 16:00) (91% - 99%)    PHYSICAL EXAMINATION:  General: Alert and Awake, NAD  HEENT: PERRL, EOMI, No subconjunctival hemorrhages, Oropharynx Clear, MMM  Neck: Supple, No SARAHI  Cardiac: RRR, No M/R/G  Resp: CTAB, No Wh/Rh/Ra  Abdomen: Obese, +RLQ ostomy with minimal stool output. +midline wound vac with serosanguinous drainage, +2 drains on left side of abdomen. Bottom drain with serosanguinous material draining but upper drain with purulent/sanguinous drainage (less cloudy today), NBS, ND, minimal abdominal pain today, No HSM, No rigidity or guarding  MSK: No LE edema. No stigmata of IE. No evidence of phlebitis. No evidence of synovitis.  : + starr  Skin: No rashes or lesions. Skin is warm and dry to the touch.   Neuro: Alert and Awake. CN 2-12 Grossly intact. Moves all four extremities spontaneously.  Psych: Calm, Pleasant, Cooperative                        7.3    14.4  )-----------( 279      ( 16 Jul 2019 09:06 )             21.3       07-17    141  |  105  |  28<H>  ----------------------------<  145<H>  4.8   |  25  |  0.63    Ca    8.4      17 Jul 2019 03:58  Phos  3.1     07-17  Mg     1.6     07-17    TPro  6.4  /  Alb  2.3<L>  /  TBili  0.4  /  DBili  0.2  /  AST  19  /  ALT  11  /  AlkPhos  149<H>  07-16          MICROBIOLOGY:  v  .Blood  07-10-19   No growth at 5 days.  --  --      .Blood  07-10-19   No growth at 5 days.  --  --      .Body Fluid abdominal fluid  07-09-19   Few Enterococcus faecalis  Few Coag Negative Staphylococcus  --  Enterococcus faecalis  Coag Negative Staphylococcus      .Body Fluid abdominal fluid  07-09-19   Few Enterococcus faecalis  Few Coag Negative Staphylococcus  --  Enterococcus faecalis  Coag Negative Staphylococcus      .Surgical Swab None Specimen Source:  abdomen, esw  07-02-19   Moderate Escherichia coli  Moderate Alpha hemolytic strep "Susceptibilities not performed"  Moderate Strep mitis oralis group "Susceptibilities not performed"  Growth in fluid media only Enterococcus faecalis  --  Escherichia coli  Enterococcus faecalis      .Blood  07-01-19   No growth at 5 days.  --  --      .Urine  07-01-19   No growth  --  --    Rapid RVP Result: Dayron (07-16 @ 14:44)    RADIOLOGY:    EXAM:  CT ABDOMEN AND PELVIS OC IC                        EXAM:  CT ANGIO CHEST (W)AW IC                        PROCEDURE DATE:  07/16/2019    No central or main pulmonary embolism. Evaluation of the lobar, segmental and subsegmental pulmonary arteries is precluded by streak and motion artifact.  Status post left hemicolectomy with a right lower quadrant colostomy.   Large open anterior abdominal wall containing mildly dilated loops of small bowel without discrete transition, likely ileus versus early/partial small bowel obstruction.  Small abdominopelvic ascites without walled off collection.  A stable 0.6 cm left upper lobe pulmonary nodule since March 2018.  A trace right pleural effusion. Small mucosal debris in the trachea and mainstem bronchi with a couple impacted left lower lobe bronchi.  Abnormally thickened endometrium to 1.2 cm. Recommend further evaluation with pelvic ultrasound.

## 2019-07-17 NOTE — PROGRESS NOTE ADULT - PROBLEM SELECTOR PLAN 3
- Continue current dose of enalaprilat while NPO  - Recommend lasix 20mg IV PRN to keep net even  - No beta blockers due to severe bronchospastic disease/asthma and known previous intolerance  - Daily standing weights if able. Strict I/Os  - Maintain Mag 2-2.5, K 4-4.5 - Continue current dose of enalaprilat while NPO  - Continue lasix 20mg IV daily  - No beta blockers due to severe bronchospastic disease/asthma and known previous intolerance  - Daily standing weights if able. Strict I/Os  - Maintain Mag 2-2.5, K 4-4.5

## 2019-07-17 NOTE — PROGRESS NOTE ADULT - ATTENDING COMMENTS
sOB  1.  Protein calorie malnutrition--optimized for calories, protein.  Trend nutritional parameters  2.  hyperglycemia--readjusting insulin  3.  Anemia--JW.  SIOBHAN potentially improving SOB, clinical stability  4.  Hyperkalemia--reducing TPN contribution

## 2019-07-17 NOTE — PROGRESS NOTE ADULT - ATTENDING COMMENTS
Patient seen and examined and agree with above.  Respiratory status has remained stable overnight.  Current management includes:  1) sinus tachycardia- discontinue metoprolol with risk benefit of bronchospasm considered. Discussed this with heart failure team. Ok to tolerated persistent tachycardia if remains < 120 bpm.  -denies chest pain or dyspnea  CT chest was negative for pulmonary embolus    2) Hyperglycemia with DM type 2- continue insulin in TPN  -monitor blood glucose with goal BG < 180    3) systolic heart failure - continue current medications  - appreciate HF team recommendations    4) Chronic atrial fibrillation- continue therapeautic lovenox    No ostomy function yet.

## 2019-07-17 NOTE — PROGRESS NOTE ADULT - ASSESSMENT
A/P:74F admitted with abdominal pain and exam findings consistent with peritonitis secondary to acute diverticulitis at the splenic flexure with evidence of contained perforation now s/p Exploratory laparotomy, left hemicolectomy, end colostomy, and ABThera Vac placement and s/p from RTOR for abdominal washout and Abthera vac placement, off pressors. TPN started 7/5 for Protein-Calorie Malnutrition    - TPN formula @ goal: Carbohydrates: 210grams, Amino Acid: 120grams, 40g lipids in 2000 ml  - Strict Intake and Output.,   - Weights three times a week  - Monitor BMP, Mg, Ionized Ca, Phosphorus daily  - Pre-albumin weekly.  - Hyperglycemia- will increase insulin from 55U to 60U today.  Continue sliding scale coverage.   - Hyperkalemia - KCl decreased from 100 meq to 60 meq today.  - HyperNa-stabilized;  No NaAce or NaCl in TPN  - Anemia- pt is a Jehov[employer] Witness- continue mngt per primary team, Hematology note appreciated  - Continue as per SICU/ Surgery d/w team, will follow with you    TPN team, pager [phone], spectra [phone]  D/w Adela Cross

## 2019-07-17 NOTE — PROGRESS NOTE ADULT - SUBJECTIVE AND OBJECTIVE BOX
Interval Events:    Seen by heme/onc -> will start procrit  Seen by pulm -> Chest PT + incentive spirometry    S: Patient doing well, denies fevers, chills, nausea, emesis, chest pain, SOB.  Pain well controlled.  Has been OOB.  Still no ostomy function    O: Vital Signs  T(C): 36.8 (07-17 @ 04:00), Max: 37.3 (07-16 @ 05:45)  HR: 119 (07-17 @ 05:14) (107 - 122)  BP: 146/67 (07-17 @ 05:00) (108/72 - 149/65)  RR: 37 (07-17 @ 05:00) (20 - 54)  SpO2: 95% (07-17 @ 05:14) (91% - 100%)  07-15-19 @ 07:01  -  07-16-19 @ 07:00  --------------------------------------------------------  IN: 2592 mL / OUT: 3415 mL / NET: -823 mL    07-16-19 @ 07:01  -  07-17-19 @ 05:24  --------------------------------------------------------  IN: 2533.3 mL / OUT: 1270 mL / NET: 1263.3 mL      General: alert and oriented, NAD  HEENT: NC/AT, NGT secured in place  Resp: airway patent, respirations unlabored  CVS: regular rate and rhythm  Abdomen: soft, some tenderness around wound site, nondistended, ostomy pink with no stool or gas in bag, wound vac over midline abdominal wound. Two BASIM's in place with serous output  Extremities: + edema UE & LE b/l                          7.3    14.4  )-----------( 279      ( 16 Jul 2019 09:06 )             21.3   07-17    141  |  105  |  28<H>  ----------------------------<  145<H>  4.8   |  25  |  0.63    Ca    8.4      17 Jul 2019 03:58  Phos  3.1     07-17  Mg     1.6     07-17    TPro  6.4  /  Alb  2.3<L>  /  TBili  0.4  /  DBili  0.2  /  AST  19  /  ALT  11  /  AlkPhos  149<H>  07-16

## 2019-07-17 NOTE — PROGRESS NOTE ADULT - ASSESSMENT
74 year old female PMHx of HFrEF s/p AICD, asthma, HTN, HLD, atrial fibrillation on eliquis who presented to [facility] on 6/30 with diverticulitis at splenic flexure with perforation on repeat scan on 7/1.  On 7/2 patient underwent Exploratory laparotomy, partial colectomy, end colostomy, and placement of temporary abdominal VAC dressing. Intraoperatively was noted of having perforation of distal transverse colon with feculent peritonitis. On 7/3 patient underwent Reopening of abdominal wound and an abdominal washout. On 7/9 patient RTOR for Abthera Vac placement and was found to have viable Edematous bowel and minimal pus-like fluid in pelvis which was sent for culture. On 7/10 hypotensive to SBP in the 80's and broadened to Imipenem. Wound cultures from 7/2/19 with E. coli, AHS, Strep mitis and Enterococcus faecalis (amp susceptible).    Ciprofloxacin 6/30 - 7/1  Aztreonam 7/2 - 7/4  Flagyl 6/30 - 7/9  Ceftriaxone 7/4 - 7/9  Imipenem 7/10 -->>    Overall, feculent peritonitis and resolved septic shock secondary to perforated diverticulitis.   7/9 surgical cultures with enterococcus. Imipenem offers coverage for ampicillin susceptible enterococcus   CoNS noted on 7/9 surgical cultures - suspect it is possible contaminant  7/10 blood cultures negative  7/11 RTOR with no evidence of purulence/infection noted  Patient developed cough but CT Chest (7/16) with mucous plugging - no evidence of pneumonia. RVP negative  CT A/P (7/16) with ascites but no abscess    #Perforated Diverticulitis  --Continue imipenem 500 mg IV Q6H (would recommend 10 day treatment course from final washout on 7/11 (End Date: 7/20) )    #Leukocytosis  --Continue to trend    I will continue to follow. Please feel free to contact me with any further questions.    Rex Goode M.D.  [facility] Division of Infectious Disease  8AM-5PM: Pager Number [phone]  After Hours: Please contact the Infectious Diseases Office at [phone]

## 2019-07-17 NOTE — PROGRESS NOTE ADULT - PROBLEM SELECTOR PROBLEM 4
DIANNA (acute kidney injury)

## 2019-07-17 NOTE — PROGRESS NOTE ADULT - ATTENDING COMMENTS
Moved back to SICU for closer monitoring. Recurrent sinus tachycardia in setting of acute illness. Would not treat the sinus tachycardia and would avoid the use of beta blockers in this patient who has severe bronchospastic disease. Stable from cardiovascular perspective.    Continue IV Enalapril and IV lasix.  Renal function stable.

## 2019-07-17 NOTE — PROGRESS NOTE ADULT - SUBJECTIVE AND OBJECTIVE BOX
[facility] NUTRITION SUPPORT / TPN -- FOLLOW UP NOTE  --------------------------------------------------------------------------------    24 hour events/subjective:  - CTPA showed no evidence of PE  - Anemia labs drawn (vit B12 >2000, folate 12.7, ferritin 1390, total iron 62, unsaturated iron binding capacity 149, total iron binding capacity 211, percent saturation 29, transferrin 126)  - Hematology to call pharmacy to start Procrit 20,000 SQ QD x5 days, Iron infusion given  - Anemia could be contributing to SOB  - no n/v/d nor f/c/s        Diet:  Diet, NPO:   Except Medications (07-09-19 @ 14:09)      Appetite: [ x ]Poor [  ]Adequate [  ]Good  Caloric intake:  [ x  ]  Adequate   [   ] Inadequate    ROS: General/ GI see HPI  all other systems negative      ALLERGIES & MEDICATIONS  --------------------------------------------------------------------------------  ALLERGIES  Coreg (Other)  digoxin (Other; Short breath (Mild to Mod))  penicillins (Hives)    Intolerances    metoprolol (Other)  Solu-Medrol (Other (Mild to Mod))      STANDING INPATIENT MEDICATIONS    ALBUTerol/ipratropium for Nebulization 3 milliLiter(s) Nebulizer every 6 hours  buDESOnide    Inhalation Suspension 0.5 milliGRAM(s) Inhalation every 12 hours  chlorhexidine 2% Cloths 1 Application(s) Topical daily  enalaprilat Injectable 2.5 milliGRAM(s) IV Push every 6 hours  enoxaparin Injectable 85 milliGRAM(s) SubCutaneous two times a day  fat emulsion (Fish Oil and Plant Based) 20% Infusion 20.8 mL/Hr IV Continuous <Continuous>  furosemide   Injectable 20 milliGRAM(s) IV Push daily  insulin lispro (HumaLOG) corrective regimen sliding scale   SubCutaneous every 6 hours  iron sucrose IVPB 200 milliGRAM(s) IV Intermittent every 24 hours  pantoprazole  Injectable 40 milliGRAM(s) IV Push every 24 hours  Parenteral Nutrition - Adult 1 Each TPN Continuous <Continuous>  Parenteral Nutrition - Adult 1 Each TPN Continuous <Continuous>      PRN INPATIENT MEDICATION  benzocaine 15 mG/menthol 3.6 mG Lozenge 1 Lozenge Oral three times a day PRN        VITALS/PHYSICAL EXAM  --------------------------------------------------------------------------------  T(C): 37.1 (07-17-19 @ 11:00), Max: 37.1 (07-17-19 @ 11:00)  HR: 103 (07-17-19 @ 13:00) (102 - 122)  BP: 107/52 (07-17-19 @ 13:00) (107/52 - 149/65)  RR: 32 (07-17-19 @ 13:00) (22 - 53)  SpO2: 93% (07-17-19 @ 13:00) (91% - 99%)  Wt(kg): --        07-16-19 @ 07:01  -  07-17-19 @ 07:00  --------------------------------------------------------  IN: 2840.9 mL / OUT: 1940 mL / NET: 900.9 mL    07-17-19 @ 07:01  -  07-17-19 @ 13:40  --------------------------------------------------------  IN: 598 mL / OUT: 0 mL / NET: 598 mL    Physical Exam:    	Gen: NAD, NGT in place, sleepy but arousable  	HEENT: NC/AT, PERRL, mucosa moist, supple neck, clear oropharynx  	Chest: clear  	GI: +softly distended, nontender, no BS                  (+)Ostomy pink & viable.  No gas or stool                  VAC intact w/ good seal, drains w/ serosanguinous drainage              :  (+)Ching              MSK/Vascular: Passive ROM x4,  no clubbing, cyanosis, nor edema                   RUE PICC dressing C/D/I w/o SOI  	Neuro: Awake and alert  	Skin: Warm, good turgor, without rashes               LABS/ CULTURES/ RADIOLOGY:              7.3    14.4  >-----------<  279      [07-16-19 @ 09:06]              21.3     141  |  105  |  28  ----------------------------<  145      [07-17-19 @ 03:58]  4.8   |  25  |  0.63        Ca     8.4     [07-17-19 @ 03:58]      iCa    1.20     [07-17 @ 04:01]      Mg     1.6     [07-17-19 @ 03:58]      Phos  3.1     [07-17-19 @ 03:58]    TPro  6.4  /  Alb  2.3  /  TBili  0.4  /  DBili  0.2  /  AST  19  /  ALT  11  /  AlkPhos  149  [07-16-19 @ 09:31]    Triglycerides, Serum: 175: Gross Hemolysis, results may be falsely decreased mg/dL (07.16.19 @ 07:33)    LDH >2250      [07-16-19 @ 07:33]    Blood Gas Arterial - Calcium, Ionized: 1.19 mmoL/L (07-16-19 @ 10:18)    CAPILLARY BLOOD GLUCOSE  POCT Blood Glucose.: 156 mg/dL (17 Jul 2019 11:53)  POCT Blood Glucose.: >600 mg/dL (17 Jul 2019 11:51)  POCT Blood Glucose.: 146 mg/dL (17 Jul 2019 05:49)  POCT Blood Glucose.: 149 mg/dL (17 Jul 2019 00:01)  POCT Blood Glucose.: 149 mg/dL (16 Jul 2019 18:40)    Prealbumin, Serum: 5 mg/dL (07-04-19 @ 22:45)

## 2019-07-17 NOTE — PROGRESS NOTE ADULT - ASSESSMENT
73 yo F with PMHx of HFrEF (EF 35% now improved to 45%) s/p CRT-D, asthma (not on BB d/t severity), HTN, HLD, atrial fibrillation on eliquis, who p/w severe, sudden onset L sided abd pain, found to have proximal descending colon/splenic flexure acute diverticulitis with evidence of contained perforation, now s/p exploratory laparotomy, left hemicolectomy, end colostomy, and ABThera VAC placement on 7/1. Transferred back to the SICU immediately postop requiring high doses of pressors, now off and being treated with broad spectrum abx. S/P abdominal washout x3 (last 7/9). Returned to OR 7/11 for abdominal closure. Vac placed. She was extubated 7/12 AM to BiPAP. She is generally normotensive tolerating IV enalaprilat. Net positive nearly 1L with stable bed weight from yesterday on lasix 20mg IV. 1.7L UOP in response to current dose. Currently does not appear to have evidence of elevated filling pressures. Peripheral edema is likely related to low albumin of 1.7.

## 2019-07-17 NOTE — PROGRESS NOTE ADULT - PROBLEM SELECTOR PLAN 2
- Management as per surgical team  - Continue TPN given low albumin and inability to feed patient enterally due to ileus

## 2019-07-17 NOTE — PROGRESS NOTE ADULT - SUBJECTIVE AND OBJECTIVE BOX
Subjective:  - Transferred back to SICU for respiratory distress. CTA negative for PE although reports pulmonary edema and trace R pleural effusion. ABG unremarkable  - Currently denies SOB, lightheadedness, CP, abdominal pain.   - Plan for OOB to chair today per nurse    Medications:  ALBUTerol/ipratropium for Nebulization 3 milliLiter(s) Nebulizer every 6 hours  benzocaine 15 mG/menthol 3.6 mG Lozenge 1 Lozenge Oral three times a day PRN  buDESOnide    Inhalation Suspension 0.5 milliGRAM(s) Inhalation every 12 hours  chlorhexidine 2% Cloths 1 Application(s) Topical daily  enalaprilat Injectable 2.5 milliGRAM(s) IV Push every 6 hours  enoxaparin Injectable 85 milliGRAM(s) SubCutaneous two times a day  fat emulsion (Fish Oil and Plant Based) 20% Infusion 20.8 mL/Hr IV Continuous <Continuous>  furosemide   Injectable 20 milliGRAM(s) IV Push daily  insulin lispro (HumaLOG) corrective regimen sliding scale   SubCutaneous every 6 hours  iron sucrose IVPB 200 milliGRAM(s) IV Intermittent every 24 hours  pantoprazole  Injectable 40 milliGRAM(s) IV Push every 24 hours  Parenteral Nutrition - Adult 1 Each TPN Continuous <Continuous>  Parenteral Nutrition - Adult 1 Each TPN Continuous <Continuous>      Physical Exam:    Vitals:  Vital Signs Last 24 Hours  T(C): 37.1 (19 @ 11:00), Max: 37.1 (19 @ 11:00)  HR: 102 (19 @ 12:00) (102 - 122)  BP: 119/58 (19 @ 11:00) (117/58 - 149/65)  RR: 31 (19 @ 11:00) (22 - 54)  SpO2: 95% (19 @ 12:00) (91% - 99%)    Weight in k.1 ( @ 00:20)    I&O's Summary    2019 07:01  -  2019 07:00  --------------------------------------------------------  IN: 2840.9 mL / OUT: 1940 mL / NET: 900.9 mL    2019 07:01  -  2019 12:27  --------------------------------------------------------  IN: 349 mL / OUT: 0 mL / NET: 349 mL    Tele: ST vpaced PVCs -110s    General: No distress. Comfortable.  HEENT: EOM intact.  Neck: Neck supple. JVP 6-8cm H2O. No masses  Chest: Diminished bilateral bases  CV: Tachycardic, regular. Normal S1 and S2. No murmurs, rub, or gallops. Radial pulses normal. +3 bilateral UE dependent edema.   Abdomen: Distended midline wound vac in place. BASIM drain x 2 with scan serosanginous drainage. R UQ ostomy without output.   Skin: as above  Neurology: Lethargic, RDZ, follows commands.  Sensation intact  Psych: Affect normal    Labs:                        7.3    14.4  )-----------( 279      ( 2019 09:06 )             21.3     -    141  |  105  |  28<H>  ----------------------------<  145<H>  4.8   |  25  |  0.63    Ca    8.4      2019 03:58  Phos  3.1       Mg     1.6         TPro  6.4  /  Alb  2.3<L>  /  TBili  0.4  /  DBili  0.2  /  AST  19  /  ALT  11  /  AlkPhos  149<H>

## 2019-07-17 NOTE — PROGRESS NOTE ADULT - ASSESSMENT
ASSESSMENT  74F admitted with abdominal pain and exam findings consistent with peritonitis secondary to acute diverticulitis at the splenic flexure with evidence of contained perforation s/p Exploratory laparotomy, left hemicolectomy, end colostomy, and ABThera Vac placement (7/2) and s/p multiple RTOR for abdominal washout and abthera vac placement (7/3, 7/5, 7/9), closure with strattice mesh and wound vac placement (7/11) Returned to SICU on 7/16 due to respiratory distress     PLAN:  NEURO: acute pain control  - Pain control with Dilaudid PRN and IV Tylenol    RESPIRATORY:  PMHx of Asthma; worsening SOB and CP on 7/14; ABG wnl on RA  - CXR improving, on exam bronchial congestion which improved with treatments   - Continue Duonebs and pulmicort  - CTPA showed no evidence of PE  - chest PT, IS  - Appreciate Pulm recs    CARDIOVASCULAR:  atrial fibrillation, HFrEF s/p AICD, non-sustained runs of V-tach   - Metoprolol 5mg q 6hrs   - Hold home anti-hypertensives  - Trend daily weight   - Lasix PRN to keep Net negative     GI/NUTRITION: s/p ex lap and L hemicolectomy with end colostomy and ABThera VAC placement and washout 7/2, s/p closure with strattice mesh and wound vac 7/11  - NPO, NGT   - Continue TPN   - Protonix for stress ulcer prophylaxis  - Monitor for ostomy function  - CT abd/pelv given prolonged ileus     GENITOURINARY/RENAL: CKD stage 3  - Primafit to monitor I&Os  - Continue diuresis as needed    HEMATOLOGIC:  * Patient is [place] & does NOT accept blood products*  - Anemia work up sent  - Appreciate Heme recs   - Hematology to call pharmacy in order to start Procrit 20,000 SQ QD x5 days   - Minimize blood draws - use pediatric tubes  - iron sucrose IVPB  - t- Lovenox     INFECTIOUS DISEASE: Enterococcus in abdominal fluid culture  - Completed course of imipenem on 7/16  - Repeat blood cultures if febrile    ENDOCRINE: DM   - ISS  - insulin in TPN 60 units    DISPO: SICU. Full code.    SICU  [id number]

## 2019-07-17 NOTE — PROGRESS NOTE ADULT - ASSESSMENT
Assessment:    ANDRE BARKER is a 74y Female with PMH of asthma, A fib, HFrEF on POD 15 from L hemicolectomy and end colostomy with VAC placement along with multiple washouts and closure with bridging mesh.     Plan:    Abx: Imipenem/cilastatin  Diet: NPO, on TPN  Pain control: Dilaudid and tylenol  Per Pulm: chest PT and incentive spirometry  Per heme/onc: Will start procrit  On ISS  On enalapril, lasix, and metoprolol      WIll likely need skin graft over abd wound in 2-4 weeks once tissue has granulated

## 2019-07-17 NOTE — PROGRESS NOTE ADULT - SUBJECTIVE AND OBJECTIVE BOX
HISTORY  74y Female with history of HFrEF s/p AICD, asthma, HTN, HLD, atrial fibrillation on Eliquis presented with severe, sudden onset L sided abd pain which started on the morning of 6/30/19 after eating. She endorsed nausea at the time but no vomiting. She did not have any fevers, chills, diarrhea or hematochezia.  CTAP w/ IV contrast showed proximal descending colon/splenic flexure diverticulitis with small amount of free fluid and 3cm low attenuation structure in the right adnexa. She developed dyspnea and wheezing after the CT scan and became hypoxic. Bedside US int he ED demonstrated a few B lines. She received 80mg total of IV L.asix, 125 of solumedrol, Duonebs x2 and Narcan and she was admitted to MICU for management of respiratory and heart failure.     Through the next day (7/1), she was persistently tachycardic with BPs as low as 76/50, MAPs in low 60s. She was bolused with 500cc of crystalloid without improvement in blood pressure. She was then started on phenylephrine. Her lactate was uptrending from 2 to 2.6 to 3 on the arterial blood gas. A repeat CT scan showed acute diverticulitis at the splenic flexure with evidence of contained perforation, but no drainable collection. 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 Pt was left open with Abthera vac. She was taken back to the OR multiple times 7/3, 7/5, and 7/9) for washout of purulent ascites, but was unable to be closed secondary to edematous bowel. She was diuresed and then on 7/11 she was taken to OR and closed with strattice mesh. 2 Wolfgang drains left in place, and wound vac placed over mesh. She returned to SICU intubated on 7/11.    On 7/16, patient developed progressive shortness of breath and reported chest pain. Concern for PE given with hx of A-fib and being off AC for the past 2 weeks. She was started on lovenox on 7/15. She returned to the SICU due to respiratory distress.      24 HOUR EVENTS:  - CTPA showed no evidence of PE  - Anemia labs drawn (vit B12 >2000, folate 12.7, ferritin 1390, total iron 62, unsaturated iron binding capacity 149, total iron binding capacity 211, percent saturation 29, transferrin 126)  - Hematology to call pharmacy to start Procrit 20,000 SQ QD x5 days     SUBJECTIVE/ROS:  [x] A ten-point review of systems was otherwise negative except as noted.  [ ] Due to altered mental status/intubation, subjective information were not able to be obtained from the patient. History was obtained, to the extent possible, from review of the chart and collateral sources of information.      NEURO  RASS:     GCS:     CAM ICU:  Exam: awake, alert, oriented  Meds:   [x] Adequacy of sedation and pain control has been assessed and adjusted      RESPIRATORY  RR: 35 (07-17-19 @ 02:00) (20 - 54)  SpO2: 93% (07-17-19 @ 02:00) (92% - 100%)  Wt(kg): --  Exam: unlabored, clear to auscultation bilaterally  On room air  Mechanical Ventilation:   ABG - ( 16 Jul 2019 10:18 )  pH: 7.46  /  pCO2: 38    /  pO2: 99    / HCO3: 26    / Base Excess: 2.7   /  SaO2: 99      Lactate: x                [N/A] Extubation Readiness Assessed  Meds: ALBUTerol/ipratropium for Nebulization 3 milliLiter(s) Nebulizer every 6 hours  buDESOnide    Inhalation Suspension 0.5 milliGRAM(s) Inhalation every 12 hours        CARDIOVASCULAR  HR: 119 (07-17-19 @ 02:00) (107 - 121)  BP: 117/58 (07-17-19 @ 02:00) (108/72 - 149/65)  BP(mean): 81 (07-17-19 @ 02:00) (79 - 98)  ABP: --  ABP(mean): --  Wt(kg): --  CVP(cm H2O): --      Exam: tachycardic   Cardiac Rhythm: Atrial fibrillation   Perfusion     [x]Adequate   [ ]Inadequate  Mentation   [x]Normal       [ ]Reduced  Extremities  [x]Warm         [ ]Cool  Volume Status [ ]Hypervolemic [x]Euvolemic [ ]Hypovolemic  Meds: enalaprilat Injectable 2.5 milliGRAM(s) IV Push every 6 hours  furosemide   Injectable 20 milliGRAM(s) IV Push daily  metoprolol tartrate Injectable 5 milliGRAM(s) IV Push every 6 hours        GI/NUTRITION  Exam: soft, nontender, nondistended   Midline VAC to suction  NGT to wall suction  Monitor BASIM drains   Diet: NPO  Meds: pantoprazole  Injectable 40 milliGRAM(s) IV Push every 24 hours      GENITOURINARY  I&O's Detail    07-15 @ 07:01  -  07-16 @ 07:00  --------------------------------------------------------  IN:    fat emulsion (Fish Oil and Plant Based) 20% Infusion: 183 mL    Solution: 100 mL    Solution: 400 mL    TPN (Total Parenteral Nutrition): 1909 mL  Total IN: 2592 mL    OUT:    Colostomy: 5 mL    Drain: 35 mL    Drain: 25 mL    Nasoenteral Tube: 350 mL    VAC (Vacuum Assisted Closure) System: 100 mL    Voided: 2900 mL  Total OUT: 3415 mL    Total NET: -823 mL      07-16 @ 07:01 - 07-17 @ 02:47  --------------------------------------------------------  IN:    fat emulsion (Fish Oil and Plant Based) 20% Infusion: 145.6 mL    Solution: 300 mL    Solution: 200 mL    Solution: 227.5 mL    TPN (Total Parenteral Nutrition): 1245 mL  Total IN: 2118.1 mL    OUT:    Colostomy: 50 mL    Drain: 10 mL    Drain: 10 mL    Nasoenteral Tube: 75 mL    VAC (Vacuum Assisted Closure) System: 25 mL    Voided: 950 mL  Total OUT: 1120 mL    Total NET: 998.1 mL          07-16    140  |  104  |  31<H>  ----------------------------<  151<H>  3.8   |  26  |  0.62    Ca    8.6      16 Jul 2019 09:31  Phos  6.5     07-16  Mg     2.4     07-16    TPro  6.4  /  Alb  2.3<L>  /  TBili  0.4  /  DBili  0.2  /  AST  19  /  ALT  11  /  AlkPhos  149<H>  07-16    Primafit to monitor I&Os  [ ] Ching catheter, indication: N/A  Meds: fat emulsion (Fish Oil and Plant Based) 20% Infusion 20.8 mL/Hr IV Continuous <Continuous>  iron sucrose IVPB 200 milliGRAM(s) IV Intermittent every 24 hours  Parenteral Nutrition - Adult 1 Each TPN Continuous <Continuous>        HEMATOLOGIC  Meds: enoxaparin Injectable 85 milliGRAM(s) SubCutaneous two times a day    [x] VTE Prophylaxis                        7.3    14.4  )-----------( 279      ( 16 Jul 2019 09:06 )             21.3       Transfusion     [ ] PRBC   [ ] Platelets   [ ] FFP   [ ] Cryoprecipitate      INFECTIOUS DISEASES  WBC Count: 14.4 K/uL (07-16 @ 09:06)    RECENT CULTURES:    Meds:       ENDOCRINE  CAPILLARY BLOOD GLUCOSE      POCT Blood Glucose.: 149 mg/dL (17 Jul 2019 00:01)  POCT Blood Glucose.: 149 mg/dL (16 Jul 2019 18:40)  POCT Blood Glucose.: 148 mg/dL (16 Jul 2019 13:14)  POCT Blood Glucose.: 119 mg/dL (16 Jul 2019 05:27)    Meds: insulin lispro (HumaLOG) corrective regimen sliding scale   SubCutaneous every 6 hours        ACCESS DEVICES:  [ ] Peripheral IV  [ ] Central Venous Line	[ ] R	[ ] L	[ ] IJ	[ ] Fem	[ ] SC	Placed:   [ ] Arterial Line		[ ] R	[ ] L	[ ] Fem	[ ] Rad	[ ] Ax	Placed:   [ ] PICC:					[ ] Mediport  [ ] Urinary Catheter, Date Placed:   [x] Necessity of urinary, arterial, and venous catheters discussed    OTHER MEDICATIONS:  benzocaine 15 mG/menthol 3.6 mG Lozenge 1 Lozenge Oral three times a day PRN  chlorhexidine 2% Cloths 1 Application(s) Topical daily      CODE STATUS:      IMAGING:

## 2019-07-18 LAB
ANION GAP SERPL CALC-SCNC: 12 MMOL/L — SIGNIFICANT CHANGE UP (ref 5–17)
BUN SERPL-MCNC: 31 MG/DL — HIGH (ref 7–23)
CALCIUM SERPL-MCNC: 8.1 MG/DL — LOW (ref 8.4–10.5)
CHLORIDE SERPL-SCNC: 102 MMOL/L — SIGNIFICANT CHANGE UP (ref 96–108)
CO2 SERPL-SCNC: 23 MMOL/L — SIGNIFICANT CHANGE UP (ref 22–31)
CREAT SERPL-MCNC: 0.58 MG/DL — SIGNIFICANT CHANGE UP (ref 0.5–1.3)
GLUCOSE BLDC GLUCOMTR-MCNC: 111 MG/DL — HIGH (ref 70–99)
GLUCOSE BLDC GLUCOMTR-MCNC: 154 MG/DL — HIGH (ref 70–99)
GLUCOSE BLDC GLUCOMTR-MCNC: 155 MG/DL — HIGH (ref 70–99)
GLUCOSE BLDC GLUCOMTR-MCNC: 164 MG/DL — HIGH (ref 70–99)
GLUCOSE SERPL-MCNC: 157 MG/DL — HIGH (ref 70–99)
HCT VFR BLD CALC: 24.5 % — LOW (ref 34.5–45)
HGB BLD-MCNC: 7.8 G/DL — LOW (ref 11.5–15.5)
MAGNESIUM SERPL-MCNC: 2.2 MG/DL — SIGNIFICANT CHANGE UP (ref 1.6–2.6)
MCHC RBC-ENTMCNC: 31.8 GM/DL — LOW (ref 32–36)
MCHC RBC-ENTMCNC: 32.7 PG — SIGNIFICANT CHANGE UP (ref 27–34)
MCV RBC AUTO: 103 FL — HIGH (ref 80–100)
PHOSPHATE SERPL-MCNC: 3.1 MG/DL — SIGNIFICANT CHANGE UP (ref 2.5–4.5)
PLATELET # BLD AUTO: 378 K/UL — SIGNIFICANT CHANGE UP (ref 150–400)
POTASSIUM SERPL-MCNC: 4.3 MMOL/L — SIGNIFICANT CHANGE UP (ref 3.5–5.3)
POTASSIUM SERPL-SCNC: 4.3 MMOL/L — SIGNIFICANT CHANGE UP (ref 3.5–5.3)
RBC # BLD: 2.38 M/UL — LOW (ref 3.8–5.2)
RBC # FLD: 20.5 % — HIGH (ref 10.3–14.5)
SODIUM SERPL-SCNC: 137 MMOL/L — SIGNIFICANT CHANGE UP (ref 135–145)
WBC # BLD: 15.3 K/UL — HIGH (ref 3.8–10.5)
WBC # FLD AUTO: 15.3 K/UL — HIGH (ref 3.8–10.5)

## 2019-07-18 PROCEDURE — 99232 SBSQ HOSP IP/OBS MODERATE 35: CPT

## 2019-07-18 PROCEDURE — 99233 SBSQ HOSP IP/OBS HIGH 50: CPT

## 2019-07-18 RX ORDER — FUROSEMIDE 40 MG
20 TABLET ORAL ONCE
Refills: 0 | Status: COMPLETED | OUTPATIENT
Start: 2019-07-18 | End: 2019-07-18

## 2019-07-18 RX ORDER — ELECTROLYTE SOLUTION,INJ
1 VIAL (ML) INTRAVENOUS
Refills: 0 | Status: DISCONTINUED | OUTPATIENT
Start: 2019-07-18 | End: 2019-07-18

## 2019-07-18 RX ORDER — PANTOPRAZOLE SODIUM 20 MG/1
40 TABLET, DELAYED RELEASE ORAL
Refills: 0 | Status: DISCONTINUED | OUTPATIENT
Start: 2019-07-18 | End: 2019-07-20

## 2019-07-18 RX ORDER — ALTEPLASE 100 MG
2 KIT INTRAVENOUS ONCE
Refills: 0 | Status: COMPLETED | OUTPATIENT
Start: 2019-07-18 | End: 2019-07-18

## 2019-07-18 RX ORDER — I.V. FAT EMULSION 20 G/100ML
20.8 EMULSION INTRAVENOUS
Qty: 50 | Refills: 0 | Status: DISCONTINUED | OUTPATIENT
Start: 2019-07-18 | End: 2019-07-19

## 2019-07-18 RX ORDER — NYSTATIN 500MM UNIT
500000 POWDER (EA) MISCELLANEOUS
Refills: 0 | Status: DISCONTINUED | OUTPATIENT
Start: 2019-07-18 | End: 2019-08-02

## 2019-07-18 RX ADMIN — Medication 2.5 MILLIGRAM(S): at 12:06

## 2019-07-18 RX ADMIN — Medication 500000 UNIT(S): at 17:54

## 2019-07-18 RX ADMIN — Medication 2.5 MILLIGRAM(S): at 05:54

## 2019-07-18 RX ADMIN — Medication 3 MILLILITER(S): at 18:45

## 2019-07-18 RX ADMIN — PANTOPRAZOLE SODIUM 40 MILLIGRAM(S): 20 TABLET, DELAYED RELEASE ORAL at 17:06

## 2019-07-18 RX ADMIN — ERYTHROPOIETIN 20000 UNIT(S): 10000 INJECTION, SOLUTION INTRAVENOUS; SUBCUTANEOUS at 22:58

## 2019-07-18 RX ADMIN — Medication 2: at 23:00

## 2019-07-18 RX ADMIN — IMIPENEM AND CILASTATIN 100 MILLIGRAM(S): 250; 250 INJECTION, POWDER, FOR SOLUTION INTRAVENOUS at 12:02

## 2019-07-18 RX ADMIN — ENOXAPARIN SODIUM 85 MILLIGRAM(S): 100 INJECTION SUBCUTANEOUS at 17:06

## 2019-07-18 RX ADMIN — IMIPENEM AND CILASTATIN 100 MILLIGRAM(S): 250; 250 INJECTION, POWDER, FOR SOLUTION INTRAVENOUS at 05:54

## 2019-07-18 RX ADMIN — Medication 20 MILLIGRAM(S): at 14:14

## 2019-07-18 RX ADMIN — Medication 2.5 MILLIGRAM(S): at 17:06

## 2019-07-18 RX ADMIN — IMIPENEM AND CILASTATIN 100 MILLIGRAM(S): 250; 250 INJECTION, POWDER, FOR SOLUTION INTRAVENOUS at 17:06

## 2019-07-18 RX ADMIN — Medication 1 EACH: at 17:42

## 2019-07-18 RX ADMIN — Medication 0.5 MILLIGRAM(S): at 18:45

## 2019-07-18 RX ADMIN — PANTOPRAZOLE SODIUM 40 MILLIGRAM(S): 20 TABLET, DELAYED RELEASE ORAL at 05:53

## 2019-07-18 RX ADMIN — Medication 2: at 12:10

## 2019-07-18 RX ADMIN — CHLORHEXIDINE GLUCONATE 1 APPLICATION(S): 213 SOLUTION TOPICAL at 05:55

## 2019-07-18 RX ADMIN — IMIPENEM AND CILASTATIN 100 MILLIGRAM(S): 250; 250 INJECTION, POWDER, FOR SOLUTION INTRAVENOUS at 23:26

## 2019-07-18 RX ADMIN — Medication 2.5 MILLIGRAM(S): at 23:01

## 2019-07-18 RX ADMIN — Medication 2: at 05:52

## 2019-07-18 RX ADMIN — I.V. FAT EMULSION 20.8 ML/HR: 20 EMULSION INTRAVENOUS at 17:42

## 2019-07-18 RX ADMIN — ENOXAPARIN SODIUM 85 MILLIGRAM(S): 100 INJECTION SUBCUTANEOUS at 05:55

## 2019-07-18 RX ADMIN — Medication 3 MILLILITER(S): at 12:26

## 2019-07-18 RX ADMIN — Medication 0.5 MILLIGRAM(S): at 05:19

## 2019-07-18 RX ADMIN — Medication 20 MILLIGRAM(S): at 05:54

## 2019-07-18 RX ADMIN — ALTEPLASE 2 MILLIGRAM(S): KIT at 14:49

## 2019-07-18 NOTE — SWALLOW BEDSIDE ASSESSMENT ADULT - SLP PERTINENT HISTORY OF CURRENT PROBLEM
74 F Bahai (cannot receive blood transfusions) with PMHx of HFrEF s/p AICD, asthma, HTN, HLD, atrial fibrillation on eliquis p/w severe, sudden onset L sided abd pain that started morning of 6/30. Patient had just finished her meal when she developed left sided abdominal pain. She reports nausea but no vomiting. Upon arrival to ED, patient with stable vitals. She received morphine for pain. Shortly after, +palpitations and SOB. She started to have wheezing and became hypoxic. Patient admitted to MICU for management of respiratory and heart failure.

## 2019-07-18 NOTE — PROGRESS NOTE ADULT - ATTENDING COMMENTS
Sleeping,on NC with daughter in attendance  1.  Protein calorie malnutrition--at goal for calories, protein and trend nutritional parameters  2.  Hyperglycemia--increased insulin requirements  3.  HyperK--decrease TPN supplement  4.  Aneria.  Concur with SIOBHAN.  Fe infusion worth it given JH although has theoretical disadvantage in face of significant infection

## 2019-07-18 NOTE — SWALLOW BEDSIDE ASSESSMENT ADULT - NS ASR SWALLOW FINDINGS DISCUS
SKIP August; Pt's daughter/Nursing/Patient/Family Nursing/RN Mariangel; Pt's daughter; JANAY Mari/Patient/Family

## 2019-07-18 NOTE — SWALLOW BEDSIDE ASSESSMENT ADULT - SWALLOW EVAL: DIAGNOSIS
Pt presents with clinical evidence of oropharyngeal dysphagia, complicated by fluctuating arousal as well as poor coordination of respiration and deglutition. Oral phase is marked by reduced bolus manipulation with reduced AP transport. Pharyngeal phase is marked by suspected significant delay in swallow initiation and severely reduced laryngeal elevation upon palpation. Vitals at baseline: SPO2 96%,  and RR 27. Post oral care, respiratory rate increased to 32-33 and post ice-chip trial, respiratory rate increased to 38.  Due to compromised respiratory status and poor arousal, Pt remains at high risk of aspiration with PO.

## 2019-07-18 NOTE — PROGRESS NOTE ADULT - SUBJECTIVE AND OBJECTIVE BOX
HISTORY  74y Female with history of HFrEF s/p AICD, asthma, HTN, HLD, atrial fibrillation on Eliquis presented with severe, sudden onset L sided abd pain which started on the morning of 6/30/19 after eating. She endorsed nausea at the time but no vomiting. She did not have any fevers, chills, diarrhea or hematochezia.  CTAP w/ IV contrast showed proximal descending colon/splenic flexure diverticulitis with small amount of free fluid and 3cm low attenuation structure in the right adnexa. She developed dyspnea and wheezing after the CT scan and became hypoxic. Bedside US int he ED demonstrated a few B lines. She received 80mg total of IV L.asix, 125 of solumedrol, Duonebs x2 and Narcan and she was admitted to MICU for management of respiratory and heart failure.     Through the next day (7/1), she was persistently tachycardic with BPs as low as 76/50, MAPs in low 60s. She was bolused with 500cc of crystalloid without improvement in blood pressure. She was then started on phenylephrine. Her lactate was uptrending from 2 to 2.6 to 3 on the arterial blood gas. A repeat CT scan showed acute diverticulitis at the splenic flexure with evidence of contained perforation, but no drainable collection. 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 Pt was left open with Abthera vac. She was taken back to the OR multiple times 7/3, 7/5, and 7/9) for washout of purulent ascites, but was unable to be closed secondary to edematous bowel. She was diuresed and then on 7/11 she was taken to OR and closed with strattice mesh. 2 Wolfgang drains left in place, and wound vac placed over mesh. She returned to SICU intubated on 7/11.    On 7/16, patient developed progressive shortness of breath and reported chest pain. Concern for PE given with hx of A-fib and being off AC for the past 2 weeks. She was started on lovenox on 7/15. She returned to the SICU due to respiratory distress.     24 HOUR EVENTS:  - Discontinued 5 mg lopressor   - Discontinued NG tube  - On 3 liters nasal cannula overnight   - Still no colostomy output   - CT abdomen and pelvis showed dilated loops of small bowel without discrete transition  - Continues to be tachycardic in the 120s, need to call cardiology to discuss decreasing the pacemaker rate    SUBJECTIVE/ROS:  [x] A ten-point review of systems was otherwise negative except as noted.  [ ] Due to altered mental status/intubation, subjective information were not able to be obtained from the patient. History was obtained, to the extent possible, from review of the chart and collateral sources of information.      NEURO  RASS:     GCS:     CAM ICU:  Exam: awake, alert, oriented  Meds:   [x] Adequacy of sedation and pain control has been assessed and adjusted      RESPIRATORY  RR: 36 (07-18-19 @ 03:00) (26 - 46)  SpO2: 92% (07-18-19 @ 03:00) (90% - 97%)  Wt(kg): --  Exam: unlabored, clear to auscultation bilaterally  3 liters nasal cannula   Mechanical Ventilation:   ABG - ( 16 Jul 2019 10:18 )  pH: 7.46  /  pCO2: 38    /  pO2: 99    / HCO3: 26    / Base Excess: 2.7   /  SaO2: 99      Lactate: x                [N/A] Extubation Readiness Assessed  Meds: ALBUTerol/ipratropium for Nebulization 3 milliLiter(s) Nebulizer every 6 hours  buDESOnide    Inhalation Suspension 0.5 milliGRAM(s) Inhalation every 12 hours        CARDIOVASCULAR  HR: 125 (07-18-19 @ 03:00) (102 - 127)  BP: 135/61 (07-18-19 @ 03:00) (103/60 - 146/67)  BP(mean): 88 (07-18-19 @ 03:00) (74 - 96)  ABP: --  ABP(mean): --  Wt(kg): --  CVP(cm H2O): --      Exam: tachycardic  Cardiac Rhythm: atrial fibrillation  Perfusion     [x]Adequate   [ ]Inadequate  Mentation   [x]Normal       [ ]Reduced  Extremities  [x]Warm         [ ]Cool  Volume Status [ ]Hypervolemic [x]Euvolemic [ ]Hypovolemic  Meds: enalaprilat Injectable 2.5 milliGRAM(s) IV Push every 6 hours  furosemide   Injectable 20 milliGRAM(s) IV Push daily        GI/NUTRITION  Exam: soft, nontender, nondistended  Midline vac to suction  Two BASIM drains   Diet:NPO  Meds: pantoprazole  Injectable 40 milliGRAM(s) IV Push every 24 hours      GENITOURINARY  Primafit in place   I&O's Detail    07-16 @ 07:01  -  07-17 @ 07:00  --------------------------------------------------------  IN:    fat emulsion (Fish Oil and Plant Based) 20% Infusion: 270.4 mL    Solution: 400 mL    Solution: 200 mL    Solution: 227.5 mL    TPN (Total Parenteral Nutrition): 1743 mL  Total IN: 2840.9 mL    OUT:    Colostomy: 50 mL    Drain: 20 mL    Drain: 20 mL    Nasoenteral Tube: 175 mL    VAC (Vacuum Assisted Closure) System: 25 mL    Voided: 1650 mL  Total OUT: 1940 mL    Total NET: 900.9 mL      07-17 @ 07:01 - 07-18 @ 03:40  --------------------------------------------------------  IN:    fat emulsion (Fish Oil and Plant Based) 20% Infusion: 208 mL    Solution: 100 mL    Solution: 250 mL    Solution: 100 mL    TPN (Total Parenteral Nutrition): 1660 mL  Total IN: 2318 mL    OUT:    Drain: 10 mL    Drain: 10 mL    Voided: 850 mL  Total OUT: 870 mL    Total NET: 1448 mL          07-18    137  |  102  |  31<H>  ----------------------------<  157<H>  4.3   |  23  |  0.58    Ca    8.1<L>      18 Jul 2019 03:07  Phos  3.1     07-18  Mg     2.2     07-18    TPro  6.4  /  Alb  2.3<L>  /  TBili  0.4  /  DBili  0.2  /  AST  19  /  ALT  11  /  AlkPhos  149<H>  07-16    [ ] Ching catheter, indication: N/A  Meds: fat emulsion (Fish Oil and Plant Based) 20% Infusion 20.8 mL/Hr IV Continuous <Continuous>  Parenteral Nutrition - Adult 1 Each TPN Continuous <Continuous>        HEMATOLOGIC  Meds: enoxaparin Injectable 85 milliGRAM(s) SubCutaneous two times a day    [x] VTE Prophylaxis                        7.3    14.4  )-----------( 279      ( 16 Jul 2019 09:06 )             21.3       Transfusion     [ ] PRBC   [ ] Platelets   [ ] FFP   [ ] Cryoprecipitate      INFECTIOUS DISEASES    RECENT CULTURES:    Meds: epoetin dejuan Injectable 00066 Unit(s) SubCutaneous <User Schedule>  imipenem/cilastatin  IVPB      imipenem/cilastatin  IVPB 500 milliGRAM(s) IV Intermittent every 6 hours        ENDOCRINE  CAPILLARY BLOOD GLUCOSE      POCT Blood Glucose.: 154 mg/dL (17 Jul 2019 23:47)  POCT Blood Glucose.: 137 mg/dL (17 Jul 2019 17:47)  POCT Blood Glucose.: 156 mg/dL (17 Jul 2019 11:53)  POCT Blood Glucose.: >600 mg/dL (17 Jul 2019 11:51)  POCT Blood Glucose.: 146 mg/dL (17 Jul 2019 05:49)    Meds: insulin lispro (HumaLOG) corrective regimen sliding scale   SubCutaneous every 6 hours        ACCESS DEVICES:  [ ] Peripheral IV  [ ] Central Venous Line	[ ] R	[ ] L	[ ] IJ	[ ] Fem	[ ] SC	Placed:   [ ] Arterial Line		[ ] R	[ ] L	[ ] Fem	[ ] Rad	[ ] Ax	Placed:   [ ] PICC:					[ ] Mediport  [ ] Urinary Catheter, Date Placed:   [x] Necessity of urinary, arterial, and venous catheters discussed    OTHER MEDICATIONS:  benzocaine 15 mG/menthol 3.6 mG Lozenge 1 Lozenge Oral three times a day PRN  chlorhexidine 2% Cloths 1 Application(s) Topical daily      CODE STATUS:      IMAGING: HISTORY  74y Female with history of HFrEF s/p AICD, asthma, HTN, HLD, atrial fibrillation on Eliquis presented with severe, sudden onset L sided abd pain which started on the morning of 6/30/19 after eating. She endorsed nausea at the time but no vomiting. She did not have any fevers, chills, diarrhea or hematochezia.  CTAP w/ IV contrast showed proximal descending colon/splenic flexure diverticulitis with small amount of free fluid and 3cm low attenuation structure in the right adnexa. She developed dyspnea and wheezing after the CT scan and became hypoxic. Bedside US int he ED demonstrated a few B lines. She received 80mg total of IV L.asix, 125 of solumedrol, Duonebs x2 and Narcan and she was admitted to MICU for management of respiratory and heart failure.     Through the next day (7/1), she was persistently tachycardic with BPs as low as 76/50, MAPs in low 60s. She was bolused with 500cc of crystalloid without improvement in blood pressure. She was then started on phenylephrine. Her lactate was uptrending from 2 to 2.6 to 3 on the arterial blood gas. A repeat CT scan showed acute diverticulitis at the splenic flexure with evidence of contained perforation, but no drainable collection. 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 Pt was left open with Abthera vac. She was taken back to the OR multiple times 7/3, 7/5, and 7/9) for washout of purulent ascites, but was unable to be closed secondary to edematous bowel. She was diuresed and then on 7/11 she was taken to OR and closed with strattice mesh. 2 Wolfgang drains left in place, and wound vac placed over mesh. She returned to SICU intubated on 7/11.    On 7/16, patient developed progressive shortness of breath and reported chest pain. Concern for PE given with hx of A-fib and being off AC for the past 2 weeks. She was started on lovenox on 7/15. She returned to the SICU due to respiratory distress.     24 HOUR EVENTS:  - Discontinued 5 mg lopressor   - Discontinued NG tube  - On 3 liters nasal cannula overnight   - 5 cc of bloody output with clots in the colostomy. Colostomy bag taken down and area around ostomy showed no signs of bleeding. Ostomy bud was pink.   - Febrile to 100.6F, follow up CBC this morning. Already receiving imipenem/cilastatin   - CT abdomen and pelvis showed dilated loops of small bowel without discrete transition  - Continues to be tachycardic in the 120s, discuss with cardiology about decreasing the pacemaker rate    SUBJECTIVE/ROS:  [x] A ten-point review of systems was otherwise negative except as noted.  [ ] Due to altered mental status/intubation, subjective information were not able to be obtained from the patient. History was obtained, to the extent possible, from review of the chart and collateral sources of information.      NEURO  RASS:     GCS:     CAM ICU:  Exam: awake, alert, oriented  Meds:   [x] Adequacy of sedation and pain control has been assessed and adjusted      RESPIRATORY  RR: 36 (07-18-19 @ 03:00) (26 - 46)  SpO2: 92% (07-18-19 @ 03:00) (90% - 97%)  Wt(kg): --  Exam: unlabored, clear to auscultation bilaterally  3 liters nasal cannula   Mechanical Ventilation:   ABG - ( 16 Jul 2019 10:18 )  pH: 7.46  /  pCO2: 38    /  pO2: 99    / HCO3: 26    / Base Excess: 2.7   /  SaO2: 99      Lactate: x                [N/A] Extubation Readiness Assessed  Meds: ALBUTerol/ipratropium for Nebulization 3 milliLiter(s) Nebulizer every 6 hours  buDESOnide    Inhalation Suspension 0.5 milliGRAM(s) Inhalation every 12 hours        CARDIOVASCULAR  HR: 125 (07-18-19 @ 03:00) (102 - 127)  BP: 135/61 (07-18-19 @ 03:00) (103/60 - 146/67)  BP(mean): 88 (07-18-19 @ 03:00) (74 - 96)  ABP: --  ABP(mean): --  Wt(kg): --  CVP(cm H2O): --      Exam: tachycardic  Cardiac Rhythm: atrial fibrillation  Perfusion     [x]Adequate   [ ]Inadequate  Mentation   [x]Normal       [ ]Reduced  Extremities  [x]Warm         [ ]Cool  Volume Status [ ]Hypervolemic [x]Euvolemic [ ]Hypovolemic  Meds: enalaprilat Injectable 2.5 milliGRAM(s) IV Push every 6 hours  furosemide   Injectable 20 milliGRAM(s) IV Push daily        GI/NUTRITION  Exam: soft, nontender, nondistended  Midline vac to wall suction  Two BASIM drains on left side of abdomen   bloody output with clots in the colostomy  Diet: NPO  Meds: pantoprazole  Injectable 40 milliGRAM(s) IV Push every 24 hours      GENITOURINARY  Primafit in place   I&O's Detail    07-16 @ 07:01  -  07-17 @ 07:00  --------------------------------------------------------  IN:    fat emulsion (Fish Oil and Plant Based) 20% Infusion: 270.4 mL    Solution: 400 mL    Solution: 200 mL    Solution: 227.5 mL    TPN (Total Parenteral Nutrition): 1743 mL  Total IN: 2840.9 mL    OUT:    Colostomy: 50 mL    Drain: 20 mL    Drain: 20 mL    Nasoenteral Tube: 175 mL    VAC (Vacuum Assisted Closure) System: 25 mL    Voided: 1650 mL  Total OUT: 1940 mL    Total NET: 900.9 mL      07-17 @ 07:01 - 07-18 @ 03:40  --------------------------------------------------------  IN:    fat emulsion (Fish Oil and Plant Based) 20% Infusion: 208 mL    Solution: 100 mL    Solution: 250 mL    Solution: 100 mL    TPN (Total Parenteral Nutrition): 1660 mL  Total IN: 2318 mL    OUT:    Drain: 10 mL    Drain: 10 mL    Voided: 850 mL  Total OUT: 870 mL    Total NET: 1448 mL          07-18    137  |  102  |  31<H>  ----------------------------<  157<H>  4.3   |  23  |  0.58    Ca    8.1<L>      18 Jul 2019 03:07  Phos  3.1     07-18  Mg     2.2     07-18    TPro  6.4  /  Alb  2.3<L>  /  TBili  0.4  /  DBili  0.2  /  AST  19  /  ALT  11  /  AlkPhos  149<H>  07-16    [ ] Ching catheter, indication: N/A  Meds: fat emulsion (Fish Oil and Plant Based) 20% Infusion 20.8 mL/Hr IV Continuous <Continuous>  Parenteral Nutrition - Adult 1 Each TPN Continuous <Continuous>        HEMATOLOGIC  Meds: enoxaparin Injectable 85 milliGRAM(s) SubCutaneous two times a day    [x] VTE Prophylaxis                        7.3    14.4  )-----------( 279      ( 16 Jul 2019 09:06 )             21.3       Transfusion     [ ] PRBC   [ ] Platelets   [ ] FFP   [ ] Cryoprecipitate      INFECTIOUS DISEASES    RECENT CULTURES:    Meds: epoetin dejuan Injectable 94079 Unit(s) SubCutaneous <User Schedule>  imipenem/cilastatin  IVPB      imipenem/cilastatin  IVPB 500 milliGRAM(s) IV Intermittent every 6 hours        ENDOCRINE  CAPILLARY BLOOD GLUCOSE      POCT Blood Glucose.: 154 mg/dL (17 Jul 2019 23:47)  POCT Blood Glucose.: 137 mg/dL (17 Jul 2019 17:47)  POCT Blood Glucose.: 156 mg/dL (17 Jul 2019 11:53)  POCT Blood Glucose.: >600 mg/dL (17 Jul 2019 11:51)  POCT Blood Glucose.: 146 mg/dL (17 Jul 2019 05:49)    Meds: insulin lispro (HumaLOG) corrective regimen sliding scale   SubCutaneous every 6 hours        ACCESS DEVICES:  [ ] Peripheral IV  [ ] Central Venous Line	[ ] R	[ ] L	[ ] IJ	[ ] Fem	[ ] SC	Placed:   [ ] Arterial Line		[ ] R	[ ] L	[ ] Fem	[ ] Rad	[ ] Ax	Placed:   [ ] PICC:					[ ] Mediport  [ ] Urinary Catheter, Date Placed:   [x] Necessity of urinary, arterial, and venous catheters discussed    OTHER MEDICATIONS:  benzocaine 15 mG/menthol 3.6 mG Lozenge 1 Lozenge Oral three times a day PRN  chlorhexidine 2% Cloths 1 Application(s) Topical daily      CODE STATUS:      IMAGING: HISTORY  74y Female with history of HFrEF s/p AICD, asthma, HTN, HLD, atrial fibrillation on Eliquis presented with severe, sudden onset L sided abd pain which started on the morning of 6/30/19 after eating. She endorsed nausea at the time but no vomiting. She did not have any fevers, chills, diarrhea or hematochezia.  CTAP w/ IV contrast showed proximal descending colon/splenic flexure diverticulitis with small amount of free fluid and 3cm low attenuation structure in the right adnexa. She developed dyspnea and wheezing after the CT scan and became hypoxic. Bedside US int he ED demonstrated a few B lines. She received 80mg total of IV L.asix, 125 of solumedrol, Duonebs x2 and Narcan and she was admitted to MICU for management of respiratory and heart failure.     Through the next day (7/1), she was persistently tachycardic with BPs as low as 76/50, MAPs in low 60s. She was bolused with 500cc of crystalloid without improvement in blood pressure. She was then started on phenylephrine. Her lactate was uptrending from 2 to 2.6 to 3 on the arterial blood gas. A repeat CT scan showed acute diverticulitis at the splenic flexure with evidence of contained perforation, but no drainable collection. 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 Pt was left open with Abthera vac. She was taken back to the OR multiple times 7/3, 7/5, and 7/9) for washout of purulent ascites, but was unable to be closed secondary to edematous bowel. She was diuresed and then on 7/11 she was taken to OR and closed with strattice mesh. 2 Wolfgang drains left in place, and wound vac placed over mesh. She returned to SICU intubated on 7/11.    On 7/16, patient developed progressive shortness of breath and reported chest pain. Concern for PE given with hx of A-fib and being off AC for the past 2 weeks. She was started on lovenox on 7/15. She returned to the SICU due to respiratory distress.     24 HOUR EVENTS:  - Discontinued 5 mg lopressor as per Cardiology, continues to be tachycardic in the 120s  - Discontinued NG tube  - On 3 liters nasal cannula overnight   - 5 cc of bloody output with clots in the colostomy. Colostomy bag taken down and area around ostomy showed no signs of bleeding. Ostomy bud was pink.   - Febrile to 100.6F, follow up CBC this morning. Already receiving imipenem/cilastatin   - CT abdomen and pelvis showed dilated loops of small bowel without discrete transition    SUBJECTIVE/ROS:  [x] A ten-point review of systems was otherwise negative except as noted.  [ ] Due to altered mental status/intubation, subjective information were not able to be obtained from the patient. History was obtained, to the extent possible, from review of the chart and collateral sources of information.      NEURO  RASS:     GCS:     CAM ICU:  Exam: awake, alert, oriented  Meds:   [x] Adequacy of sedation and pain control has been assessed and adjusted      RESPIRATORY  RR: 36 (07-18-19 @ 03:00) (26 - 46)  SpO2: 92% (07-18-19 @ 03:00) (90% - 97%)  Wt(kg): --  Exam: unlabored, clear to auscultation bilaterally  3 liters nasal cannula   Mechanical Ventilation:   ABG - ( 16 Jul 2019 10:18 )  pH: 7.46  /  pCO2: 38    /  pO2: 99    / HCO3: 26    / Base Excess: 2.7   /  SaO2: 99      Lactate: x                [N/A] Extubation Readiness Assessed  Meds: ALBUTerol/ipratropium for Nebulization 3 milliLiter(s) Nebulizer every 6 hours  buDESOnide    Inhalation Suspension 0.5 milliGRAM(s) Inhalation every 12 hours        CARDIOVASCULAR  HR: 125 (07-18-19 @ 03:00) (102 - 127)  BP: 135/61 (07-18-19 @ 03:00) (103/60 - 146/67)  BP(mean): 88 (07-18-19 @ 03:00) (74 - 96)  ABP: --  ABP(mean): --  Wt(kg): --  CVP(cm H2O): --      Exam: tachycardic  Cardiac Rhythm: atrial fibrillation  Perfusion     [x]Adequate   [ ]Inadequate  Mentation   [x]Normal       [ ]Reduced  Extremities  [x]Warm         [ ]Cool  Volume Status [ ]Hypervolemic [x]Euvolemic [ ]Hypovolemic  Meds: enalaprilat Injectable 2.5 milliGRAM(s) IV Push every 6 hours  furosemide   Injectable 20 milliGRAM(s) IV Push daily        GI/NUTRITION  Exam: soft, nontender, nondistended  Midline vac to wall suction  Two BASIM drains on left side of abdomen   bloody output with clots in the colostomy  Diet: NPO  Meds: pantoprazole  Injectable 40 milliGRAM(s) IV Push every 24 hours      GENITOURINARY  Primafit in place   I&O's Detail    07-16 @ 07:01  -  07-17 @ 07:00  --------------------------------------------------------  IN:    fat emulsion (Fish Oil and Plant Based) 20% Infusion: 270.4 mL    Solution: 400 mL    Solution: 200 mL    Solution: 227.5 mL    TPN (Total Parenteral Nutrition): 1743 mL  Total IN: 2840.9 mL    OUT:    Colostomy: 50 mL    Drain: 20 mL    Drain: 20 mL    Nasoenteral Tube: 175 mL    VAC (Vacuum Assisted Closure) System: 25 mL    Voided: 1650 mL  Total OUT: 1940 mL    Total NET: 900.9 mL      07-17 @ 07:01 - 07-18 @ 03:40  --------------------------------------------------------  IN:    fat emulsion (Fish Oil and Plant Based) 20% Infusion: 208 mL    Solution: 100 mL    Solution: 250 mL    Solution: 100 mL    TPN (Total Parenteral Nutrition): 1660 mL  Total IN: 2318 mL    OUT:    Drain: 10 mL    Drain: 10 mL    Voided: 850 mL  Total OUT: 870 mL    Total NET: 1448 mL          07-18    137  |  102  |  31<H>  ----------------------------<  157<H>  4.3   |  23  |  0.58    Ca    8.1<L>      18 Jul 2019 03:07  Phos  3.1     07-18  Mg     2.2     07-18    TPro  6.4  /  Alb  2.3<L>  /  TBili  0.4  /  DBili  0.2  /  AST  19  /  ALT  11  /  AlkPhos  149<H>  07-16    [ ] Ching catheter, indication: N/A  Meds: fat emulsion (Fish Oil and Plant Based) 20% Infusion 20.8 mL/Hr IV Continuous <Continuous>  Parenteral Nutrition - Adult 1 Each TPN Continuous <Continuous>        HEMATOLOGIC  Meds: enoxaparin Injectable 85 milliGRAM(s) SubCutaneous two times a day    [x] VTE Prophylaxis                        7.3    14.4  )-----------( 279      ( 16 Jul 2019 09:06 )             21.3       Transfusion     [ ] PRBC   [ ] Platelets   [ ] FFP   [ ] Cryoprecipitate      INFECTIOUS DISEASES    RECENT CULTURES:    Meds: epoetin dejuan Injectable 71278 Unit(s) SubCutaneous <User Schedule>  imipenem/cilastatin  IVPB      imipenem/cilastatin  IVPB 500 milliGRAM(s) IV Intermittent every 6 hours        ENDOCRINE  CAPILLARY BLOOD GLUCOSE      POCT Blood Glucose.: 154 mg/dL (17 Jul 2019 23:47)  POCT Blood Glucose.: 137 mg/dL (17 Jul 2019 17:47)  POCT Blood Glucose.: 156 mg/dL (17 Jul 2019 11:53)  POCT Blood Glucose.: >600 mg/dL (17 Jul 2019 11:51)  POCT Blood Glucose.: 146 mg/dL (17 Jul 2019 05:49)    Meds: insulin lispro (HumaLOG) corrective regimen sliding scale   SubCutaneous every 6 hours        ACCESS DEVICES:  [ ] Peripheral IV  [ ] Central Venous Line	[ ] R	[ ] L	[ ] IJ	[ ] Fem	[ ] SC	Placed:   [ ] Arterial Line		[ ] R	[ ] L	[ ] Fem	[ ] Rad	[ ] Ax	Placed:   [ ] PICC:					[ ] Mediport  [ ] Urinary Catheter, Date Placed:   [x] Necessity of urinary, arterial, and venous catheters discussed    OTHER MEDICATIONS:  benzocaine 15 mG/menthol 3.6 mG Lozenge 1 Lozenge Oral three times a day PRN  chlorhexidine 2% Cloths 1 Application(s) Topical daily      CODE STATUS:      IMAGING:

## 2019-07-18 NOTE — PROGRESS NOTE ADULT - SUBJECTIVE AND OBJECTIVE BOX
[facility] NUTRITION SUPPORT / TPN -- FOLLOW UP NOTE  --------------------------------------------------------------------------------    24 hour events/subjective:    - Discontinued 5 mg lopressor as per Cardiology, continues to be tachycardic in the 120s  - Discontinued NG tube  - On 3 liters nasal cannula overnight   - 5 cc of bloody output with clots in the colostomy. Colostomy bag taken down and area around ostomy showed no signs of bleeding. Ostomy bud was pink.   - Febrile to 100.6F, follow up CBC this morning. Already receiving imipenem/cilastatin   - CT abdomen and pelvis showed dilated loops of small bowel without discrete transition  - Attempted by SICU team to get pt OOB to chair - patient too sleepy- will try later- daughter states that her mom is more nocturnal  - Procrit initiated        Diet:  Diet, NPO:   Except Medications (07-09-19 @ 14:09)      Appetite: [ x ]Poor [  ]Adequate [  ]Good  Caloric intake:  [ x  ]  Adequate   [   ] Inadequate    ROS: General/ GI see HPI  all other systems negative      ALLERGIES & MEDICATIONS  --------------------------------------------------------------------------------  ALLERGIES  Coreg (Other)  digoxin (Other; Short breath (Mild to Mod))  penicillins (Hives)      INTOLERANCES    metoprolol (Other)  Solu-Medrol (Other (Mild to Mod))      STANDING INPATIENT MEDICATIONS    ALBUTerol/ipratropium for Nebulization 3 milliLiter(s) Nebulizer every 6 hours  alteplase for catheter clearance 2 milliGRAM(s) Catheter once  buDESOnide    Inhalation Suspension 0.5 milliGRAM(s) Inhalation every 12 hours  chlorhexidine 2% Cloths 1 Application(s) Topical daily  enalaprilat Injectable 2.5 milliGRAM(s) IV Push every 6 hours  enoxaparin Injectable 85 milliGRAM(s) SubCutaneous two times a day  epoetin dejuan Injectable 86735 Unit(s) SubCutaneous <User Schedule>  fat emulsion (Fish Oil and Plant Based) 20% Infusion 20.8 mL/Hr IV Continuous <Continuous>  furosemide   Injectable 20 milliGRAM(s) IV Push daily  imipenem/cilastatin  IVPB      imipenem/cilastatin  IVPB 500 milliGRAM(s) IV Intermittent every 6 hours  insulin lispro (HumaLOG) corrective regimen sliding scale   SubCutaneous every 6 hours  pantoprazole  Injectable 40 milliGRAM(s) IV Push two times a day  Parenteral Nutrition - Adult 1 Each TPN Continuous <Continuous>  Parenteral Nutrition - Adult 1 Each TPN Continuous <Continuous>      PRN INPATIENT MEDICATION  benzocaine 15 mG/menthol 3.6 mG Lozenge 1 Lozenge Oral three times a day PRN        VITALS/PHYSICAL EXAM  --------------------------------------------------------------------------------  T(C): 37.2 (07-18-19 @ 11:00), Max: 38.1 (07-18-19 @ 03:00)  HR: 105 (07-18-19 @ 12:26) (102 - 127)  BP: 123/60 (07-18-19 @ 12:00) (95/57 - 148/69)  RR: 28 (07-18-19 @ 12:00) (26 - 46)  SpO2: 97% (07-18-19 @ 12:26) (90% - 98%)  Wt(kg): --        07-17-19 @ 07:01  -  07-18-19 @ 07:00  --------------------------------------------------------  IN: 2733.2 mL / OUT: 1335 mL / NET: 1398.2 mL    07-18-19 @ 07:01  -  07-18-19 @ 14:36  --------------------------------------------------------  IN: 415 mL / OUT: 515 mL / NET: -100 mL    Physical Exam:    	Gen: NAD, NGT in place, sleepy but arousable  	HEENT: NC/AT, PERRL, mucosa moist, supple neck, clear oropharynx  	Chest: clear  	GI: +softly distended, nontender, no BS                  (+)Ostomy pink & viable.  No gas or stool                  VAC intact w/ good seal, drains w/ serosanguinous drainage              :  (+)Ching              MSK/Vascular: Passive ROM x4,  no clubbing, cyanosis, nor edema                   RUE PICC dressing C/D/I w/o SOI  	Neuro: Awake and alert  	Skin: Warm, good turgor, without rashes                     LABS/ CULTURES/ RADIOLOGY:              7.8    15.3  >-----------<  378      [07-18-19 @ 05:03]              24.5     137  |  102  |  31  ----------------------------<  157      [07-18-19 @ 03:07]  4.3   |  23  |  0.58        Ca     8.1     [07-18-19 @ 03:07]      iCa    1.20     [07-17 @ 04:01]      Mg     2.2     [07-18-19 @ 03:07]      Phos  3.1     [07-18-19 @ 03:07]      CAPILLARY BLOOD GLUCOSE      POCT Blood Glucose.: 164 mg/dL (18 Jul 2019 12:08)  POCT Blood Glucose.: 154 mg/dL (18 Jul 2019 05:49)  POCT Blood Glucose.: 154 mg/dL (17 Jul 2019 23:47)  POCT Blood Glucose.: 137 mg/dL (17 Jul 2019 17:47)    Prealbumin, Serum: 5 mg/dL (07-04-19 @ 22:45)    < from: CT Angio Chest w/ IV Cont (07.16.19 @ 17:06) >  FINDINGS:    CTA: Evaluation is limited by streak and motion artifact. No central or   main pulmonary embolism. Evaluation of the lobar, segmental and   subsegmental pulmonary arteries is precluded by streak artifact. Thoracic   aorta and main pulmonary artery are normal in caliber.    LUNGS AND AIRWAYS: Layering debris in the dependent trachea and mainstem   bronchi with a couple impacted airways in the left lower lobe. A 0.6 cm   left upper lobe pulmonary nodule (series 5, image 27), unchanged from   03/09/2018. Subsegmental right middle and lower lobe linear atelectasis.    PLEURA: A trace right pleural effusion.    MEDIASTINUM AND MARIETTA: No lymphadenopathy.    VESSELS: As above.    HEART: Heart size is normal. Partially imaged cardiac device leads. No   pericardial effusion.    CHEST WALL AND LOWER NECK: A partially imaged right-sided central line.    ABDOMEN AND PELVIS:    LIVER: Within normal limits.  BILE DUCTS: Normal caliber.  GALLBLADDER: Status post cholecystectomy  SPLEEN: Within normal limits.  PANCREAS: Within normal limits.  ADRENALS: Within normal limits.  KIDNEYS/URETERS: Nonobstructing left renal stones measuring up to 0.6 cm   at the lower pole.    BLADDER: Tiny intravesicular air; correlate for recent instrumentation.  REPRODUCTIVE ORGANS: Calcified uterine fibroids. Endometrium is   abnormally thickened to 1.2 cm at the fundus. No solid adnexal masses.    BOWEL: Status post left hemicolectomy with a right colostomy. Open   abdominal wall with minimally dilated small bowel loops anteriorly at the   level of the open wall with gradual transition to normal caliber small   bowel, suggestive of ileus versus early/partial obstruction. Enteric tube   coils in the stomach.   PERITONEUM: Small simple abdominopelvic ascites. Two lower abdominal   approach drainage catheters terminate in the pelvis. No walled off   collection.  VESSELS:  Atherosclerotic change.  RETROPERITONEUM: No lymphadenopathy.    ABDOMINAL WALL: Anasarca. Right lower abdominal wall colostomy. Open   abdominal wall containing multiple mildly dilated small bowel loops, as   above.  BONES: Multilevel degenerative change.    IMPRESSION:     No central or main pulmonary embolism. Evaluation of the lobar, segmental   and subsegmental pulmonary arteries is precluded by streak and motion   artifact.    Status post left hemicolectomy with a right lower quadrant colostomy.   Large open anterior abdominal wall containing mildly dilated loops of   small bowel without discrete transition, likely ileus versus   early/partial small bowel obstruction.    Small abdominopelvic ascites without walled off collection.    A stable 0.6 cm left upper lobe pulmonary nodule since March 2018.    A trace right pleural effusion. Small mucosal debris in the trachea and   mainstem bronchi with a couple impacted left lower lobe bronchi.    Abnormally thickened endometrium to 1.2 cm. Recommend further evaluation   with pelvic ultrasound.    < end of copied text >

## 2019-07-18 NOTE — SWALLOW BEDSIDE ASSESSMENT ADULT - COMMENTS
SICU 7/1: SICU - CT scan showed "Acute diverticulitis at the splenic flexure with evidence of contained perforation. No drainable regional collection. Increased ascites in the deep pelvis." She was transferred to the SICU for further HD monitoring. +Septic shock with DIANNA stage 2 on CKD 3, hypotension requiring pressor support, altered mental status, lactic acidosis, and peritonitis in setting of acute diverticulitis and bowel perforation, with steroid used for possible allergic reaction on 6/30. 7/2: OR for exploratory laparotomy, left hemicolectomy, end colostomy, and ABThera VAC placement. Transferred back to the SICU immediately postop requiring high doses of norepinephrine, vasopressin. 7/12: Care coordination note – pt on 8ICU: “Pt with multiple RTOR for abdominal washout and abthera VAC placement (7/3, 7/5, 7/9). Pt now s/p OR for closure with strattice mesh and wound VAC. placement yesterday, 7/11. Pt now extubated to bipap today, weaning as tolerated. Pt NPO on TPN. 7/15: Heart failure NP: “Continue TPN given low albumin and inability to feed patient enterally due to ileus.” 7/15: care coordination note: On  7/2/19  dx  perforated  diverticulum    and s/p  Exploratory laparotomy with end  colostomy. 7/3/19    RTOR  s/p  abdominal  washout and  placement of  VAC. 7/5/19   dx  Open  Abdomen:  s/p  Abdominal washout,  replacement of vacuum-assisted closure dressing. 7/9/19   dx  Open  abdomen:   s/p  Abdominal washout and replacement of ABThera VAC. 7/11/19  dx  Abdominal washout.,  Closure of incisional hernia with biologic mesh. 7/16: She returned to the SICU due to respiratory distress.” 7/18: discussed Pt with team – concern re: recent aspiration event while on regular floor, therefore, transferred back to 8ICU due to respiratory distress. SICU 7/1: SICU - CT scan showed "Acute diverticulitis at the splenic flexure with evidence of contained perforation. No drainable regional collection. Increased ascites in the deep pelvis." She was transferred to the SICU for further HD monitoring. +Septic shock with DIANNA stage 2 on CKD 3, hypotension requiring pressor support, altered mental status, lactic acidosis, and peritonitis in setting of acute diverticulitis and bowel perforation, with steroid used for possible allergic reaction on 6/30. 7/2: OR for exploratory laparotomy, left hemicolectomy, end colostomy, and ABThera VAC placement. Transferred back to the SICU immediately postop requiring high doses of norepinephrine, vasopressin. 7/12: Care coordination note – pt on 8ICU: “Pt with multiple RTOR for abdominal washout and abthera VAC placement (7/3, 7/5, 7/9). Pt now s/p OR for closure with strattice mesh and wound VAC. placement yesterday, 7/11. Pt now extubated to bipap today, weaning as tolerated. Pt NPO on TPN. 7/15: Heart failure NP: “Continue TPN given low albumin and inability to feed patient enterally due to ileus.” 7/15: care coordination note: On  7/2/19  dx  perforated  diverticulum    and s/p  Exploratory laparotomy with end  colostomy. 7/3/19    RTOR  s/p  abdominal  washout and  placement of  VAC. 7/5/19   dx  Open  Abdomen:  s/p  Abdominal washout,  replacement of vacuum-assisted closure dressing. 7/9/19   dx  Open  abdomen:   s/p  Abdominal washout and replacement of ABThera VAC. 7/11/19  dx  Abdominal washout.,  Closure of incisional hernia with biologic mesh. 7/16: She returned to the SICU due to respiratory distress.” 7/18: discussed Pt with team – concern re: recent aspiration event while on regular floor, therefore, transferred back to 8ICU due to respiratory distress. 7/16: CXR: the lungs are clear SICU 7/1: SICU - CT scan showed Acute diverticulitis at the splenic flexure with evidence of contained perforation. No drainable regional collection. Increased ascites in the deep pelvis. She was transferred to the SICU for further HD monitoring. +Septic shock with DIANNA stage 2 on CKD 3, hypotension requiring pressor support, altered mental status, lactic acidosis, and peritonitis in setting of acute diverticulitis and bowel perforation, with steroid used for possible allergic reaction on 6/30. 7/2: OR for exploratory laparotomy, left hemicolectomy, end colostomy, and ABThera VAC placement. Transferred back to the SICU immediately postop requiring high doses of norepinephrine, vasopressin.  Pt with multiple RTOR for abdominal washout and abthera VAC placement (7/3, 7/5, 7/9). Pt now s/p OR for closure with strattice mesh and wound VAC. placement yesterday, 7/11. Pt now extubated to bipap today, weaning as tolerated. Pt NPO on TPN. 7/15: Heart failure NP: plan to continue TPN given low albumin and inability to feed patient enterally due to ileus. 7/16: She returned to the SICU due to respiratory distress. 7/18: discussed Pt with team – concern re: recent aspiration event while on regular floor, therefore, transferred back to 8ICU due to respiratory distress.  CXR 7/16: the lungs are clear  Multiple intubations/extubations: Intubated 7/2, extubated 7/8; re-intubated 7/9 & extubated 7/10; reintubated 7/11 & extubated 7/12. SICU 7/1: SICU - CT scan showed Acute diverticulitis at the splenic flexure with evidence of contained perforation. No drainable regional collection. Increased ascites in the deep pelvis. She was transferred to the SICU for further HD monitoring. +Septic shock with DIANNA stage 2 on CKD 3, hypotension requiring pressor support, altered mental status, lactic acidosis, and peritonitis in setting of acute diverticulitis and bowel perforation, with steroid used for possible allergic reaction on 6/30. 7/2: OR for exploratory laparotomy, left hemicolectomy, end colostomy, and ABThera VAC placement. Transferred back to the SICU immediately postop requiring high doses of norepinephrine, vasopressin.  Pt with multiple RTOR for abdominal washout and abthera VAC placement (7/3, 7/5, 7/9). Pt s/p OR for closure with strattice mesh and wound VAC. placement 7/11. Pt NPO on TPN. 7/15: Heart failure NP: plan to continue TPN given low albumin and inability to feed patient enterally due to ileus. 7/16: She returned to the SICU due to respiratory distress. 7/18: discussed Pt with team – concern re: recent aspiration event while on regular floor, therefore, transferred back to 8ICU due to respiratory distress. CXR 7/16: the lungs are clear. CT Abd/Pelvis 7/16: A trace right pleural effusion. Small mucosal debris in the trachea and mainstem bronchi with a couple impacted left lower lobe bronchi. Large open anterior abdominal wall containing mildly dilated loops of small bowel without discrete transition, likely ileus versus early/partial small bowel obstruction. Multiple intubations/extubations: Intubated 7/2, extubated 7/8; re-intubated 7/9 & extubated 7/10; reintubated 7/11 & extubated 7/12.  Swallowing History: 8/30/16: patient was intubated x1 month, subsequent trach and PEG. Rehab with FEES completed to clear patient for Puree diet; tolerating diet without difficulty for ~2 months. Puree diet maintained due to edentulous, unable to wear dentures 2/2 weight loss.

## 2019-07-18 NOTE — PROGRESS NOTE ADULT - ASSESSMENT
A/P:74F admitted with abdominal pain and exam findings consistent with peritonitis secondary to acute diverticulitis at the splenic flexure with evidence of contained perforation now s/p Exploratory laparotomy, left hemicolectomy, end colostomy, and ABThera Vac placement and s/p from RTOR for abdominal washout and Abthera vac placement, off pressors. TPN started 7/5 for Protein-Calorie Malnutrition    - TPN formula @ goal: Carbohydrates: 210grams, Amino Acid: 120grams, 40g lipids in 2000 ml  - Strict Intake and Output.,   - Weights three times a week  - Monitor BMP, Mg, Ionized Ca, Phosphorus daily  - Pre-albumin weekly.  - Hyperglycemia- will increase insulin from 55U to 60U today.  Continue sliding scale coverage.   - Hyperkalemia - KCl decreased from 100 meq to 60 meq today.  - HyperNa-stabilized;  No NaAce or NaCl in TPN  - Anemia- pt is a Jehov[employer]- continue mngt per primary team, Hematology note appreciated  - Continue as per SICU/ Surgery d/w team, will follow with you    TPN team, pager [phone], spectra [phone]  D/w Adela Cross A/P:74F admitted with abdominal pain and exam findings consistent with peritonitis secondary to acute diverticulitis at the splenic flexure with evidence of contained perforation now s/p Exploratory laparotomy, left hemicolectomy, end colostomy, and ABThera Vac placement and s/p from RTOR for abdominal washout and Abthera vac placement, off pressors. TPN started 7/5 for Protein-Calorie Malnutrition    - TPN formula @ goal: Carbohydrates: 210grams, Amino Acid: 120grams, 40g lipids in 2000 ml  - Strict Intake and Output.,   - Weights three times a week  - Monitor BMP, Mg, Ionized Ca, Phosphorus daily  - Pre-albumin weekly.  - Hyperglycemia- improved= will keep insulin at 60U today.  Continue sliding scale coverage.   - Hyperkalemia - stable - KCl decreased to 60 meq today.  - HyperNa-stabilized;  No NaAce or NaCl in TPN  - Anemia-Procrit tx initiated- pt is ok with it- pt is a Jehovah Witness- Hematology note appreciated  - Continue as per SICU/ Surgery d/w team, will follow with you    TPN team, pager [phone], spectra [phone]  D/w Adela Cross

## 2019-07-18 NOTE — PROGRESS NOTE ADULT - ASSESSMENT
74 year old female PMHx of HFrEF s/p AICD, asthma, HTN, HLD, atrial fibrillation on eliquis who presented to [facility] on 6/30 with diverticulitis at splenic flexure with perforation on repeat scan on 7/1.  On 7/2 patient underwent Exploratory laparotomy, partial colectomy, end colostomy, and placement of temporary abdominal VAC dressing. Intraoperatively was noted of having perforation of distal transverse colon with feculent peritonitis. On 7/3 patient underwent Reopening of abdominal wound and an abdominal washout. On 7/9 patient RTOR for Abthera Vac placement and was found to have viable Edematous bowel and minimal pus-like fluid in pelvis which was sent for culture. On 7/10 hypotensive to SBP in the 80's and broadened to Imipenem. Wound cultures from 7/2/19 with E. coli, AHS, Strep mitis and Enterococcus faecalis (amp susceptible).    Ciprofloxacin 6/30 - 7/1  Aztreonam 7/2 - 7/4  Flagyl 6/30 - 7/9  Ceftriaxone 7/4 - 7/9  Imipenem 7/10 -->>    Overall, feculent peritonitis and resolved septic shock secondary to perforated diverticulitis.   7/9 surgical cultures with enterococcus. Imipenem offers coverage for ampicillin susceptible enterococcus   CoNS noted on 7/9 surgical cultures - suspect it is possible contaminant  7/10 blood cultures negative  7/11 RTOR with no evidence of purulence/infection noted  Patient developed cough but CT Chest (7/16) with mucous plugging - no evidence of pneumonia. RVP negative  CT A/P (7/16) with ascites but no abscess  New low grade fever overnight - would monitor and obtain BCx if continues.     #Perforated Diverticulitis  --Continue imipenem 500 mg IV Q6H (would recommend 10 day treatment course from final washout on 7/11 (End Date: 7/20) )    #Fever  --Recommend 2 sets of blood cultures if patient spikes fever again   --Consider upper extremity DVT US (significant edema)    #Encephalopathy - ?ICU Delirium vs. SBO vs. New ID Process  --Would obtain imipenem as above - would obtain BCx if febrile again.     #Leukocytosis  --Continue to trend    I will continue to follow. Please feel free to contact me with any further questions.    Rex Goode M.D.  [facility] Division of Infectious Disease  8AM-5PM: Pager Number [phone]  After Hours: Please contact the Infectious Diseases Office at [phone]

## 2019-07-18 NOTE — PROGRESS NOTE ADULT - ASSESSMENT
ASSESSMENT  74F admitted with abdominal pain and exam findings consistent with peritonitis secondary to acute diverticulitis at the splenic flexure with evidence of contained perforation s/p Exploratory laparotomy, left hemicolectomy, end colostomy, and ABThera Vac placement (7/2) and s/p multiple RTOR for abdominal washout and abthera vac placement (7/3, 7/5, 7/9), closure with strattice mesh and wound vac placement (7/11) Returned to SICU on 7/16 due to respiratory distress.    PLAN:  NEURO: acute pain control  - Pain control with Dilaudid PRN and IV Tylenol    RESPIRATORY:  PMHx of Asthma; worsening SOB and CP on 7/14; ABG wnl on RA  - CXR improving, on exam bronchial congestion which improved with treatments   - Continue Duonebs and pulmicort  - CTPA showed no evidence of PE  - chest PT, IS  - Appreciate Pulm recs    CARDIOVASCULAR:  atrial fibrillation, HFrEF s/p AICD, non-sustained runs of V-tach   - Metoprolol 5mg q 6hrs   - Hold home anti-hypertensives  - Trend daily weight   - Lasix PRN to keep Net negative     GI/NUTRITION: s/p ex lap and L hemicolectomy with end colostomy and ABThera VAC placement and washout 7/2, s/p closure with strattice mesh and wound vac 7/11  - NPO, NGT   - Continue TPN   - Protonix for stress ulcer prophylaxis  - Monitor for ostomy function  - CT abd/pelv given prolonged ileus     GENITOURINARY/RENAL: CKD stage 3  - Primafit to monitor I&Os  - Continue diuresis as needed  - 20 mg lasix daily     HEMATOLOGIC:  * Patient is [employer] & does NOT accept blood products*  - Anemia work up sent  - Appreciate Heme recs   - Started Procrit 20,000 SQ QD x5 days   - Minimize blood draws - use pediatric tubes  - iron sucrose IVPB  - t- Lovenox     INFECTIOUS DISEASE: Enterococcus in abdominal fluid culture  - Completed course of imipenem/cilastatin 500mg IV Q6H on 7/20  - Repeat blood cultures if febrile    ENDOCRINE: DM   - ISS  - insulin in TPN 60 units    DISPO: SICU. Full code.    SICU  [id number] ASSESSMENT  74F admitted with abdominal pain and exam findings consistent with peritonitis secondary to acute diverticulitis at the splenic flexure with evidence of contained perforation s/p Exploratory laparotomy, left hemicolectomy, end colostomy, and ABThera Vac placement (7/2) and s/p multiple RTOR for abdominal washout and abthera vac placement (7/3, 7/5, 7/9), closure with strattice mesh and wound vac placement (7/11) Returned to SICU on 7/16 due to respiratory distress.    PLAN:  NEURO: acute pain control  - Pain control with Dilaudid PRN and IV Tylenol    RESPIRATORY:  PMHx of Asthma; worsening SOB and CP on 7/14; ABG wnl on RA  - CXR improving, on exam bronchial congestion which improved with treatments   - Continue Duonebs and pulmicort  - CTPA showed no evidence of PE  - chest PT, IS  - Appreciate Pulm recs    CARDIOVASCULAR:  atrial fibrillation, HFrEF s/p AICD, non-sustained runs of V-tach   - discontinued Metoprolol 5mg q 6hrs   - Hold home anti-hypertensives  - Trend daily weight   - Lasix PRN to keep Net negative     GI/NUTRITION: s/p ex lap and L hemicolectomy with end colostomy and ABThera VAC placement and washout 7/2, s/p closure with strattice mesh and wound vac 7/11  - NPO, NGT   - Continue TPN   - Protonix for stress ulcer prophylaxis  - bloody output with clot from ostomy  - Monitor for ostomy function  - CT abd/pelv given prolonged ileus     GENITOURINARY/RENAL: CKD stage 3  - Primafit to monitor I&Os  - Continue diuresis as needed  - 20 mg lasix daily     HEMATOLOGIC:  * Patient is [employer] & does NOT accept blood products*  - Anemia work up sent  - Appreciate Heme recs   - Started Procrit 20,000 SQ QD x5 days   - Minimize blood draws - use pediatric tubes  - iron sucrose IVPB  - t- Lovenox     INFECTIOUS DISEASE: Enterococcus in abdominal fluid culture  - Completed course of imipenem/cilastatin 500mg IV Q6H on 7/20  - F/u WBC in AM CBC  - Repeat blood cultures if febrile    ENDOCRINE: DM   - ISS  - insulin in TPN 60 units    DISPO: SICU. Full code.    SICU  [id number]

## 2019-07-18 NOTE — SWALLOW BEDSIDE ASSESSMENT ADULT - SLP GENERAL OBSERVATIONS
Pt encountered bedside, AA&Ox2 (person, place) and re-oriented to time. Pt stated first/last name and month/date of birthday, however, unable to recall year. Pt required consistent verbal/tactile cues and cold compress upon forehead to remain alert for evaluation purposes. Pt demonstrated limited ability to answer simple yes/no questions and follow simple commands due to reduced mentation. +Secretions upon lingual surface/soft palate, which required oral care via oral rinkse/toothette as well as Yankauer suctioning to remove. +hypophonia with weak volitional cough at this time. Chart reviewed. Discussed Pt team - as per team, Pt cleared for clear liquid trials only at this time. Pt encountered bedside, AA&Ox2 (person, place) and re-oriented to time. Pt stated first/last name and month/date of birthday, however, unable to recall year. Pt required consistent verbal/tactile cues and cold compress upon forehead to remain alert for evaluation purposes. Pt demonstrated limited ability to answer simple yes/no questions and follow simple commands due to reduced mentation. +Secretions upon lingual surface/soft palate, which required oral care via oral rinkse/toothette as well as Yankauer suctioning to remove. +hypophonia with weak volitional cough at this time.

## 2019-07-18 NOTE — PROGRESS NOTE ADULT - ASSESSMENT
Assessment:    ANDRE BARKER is a 74y Female with PMH of asthma, A fib, HFrEF on POD 15 from L hemicolectomy and end colostomy with VAC placement along with multiple washouts and closure with bridging mesh.       Plan:    Abx: Imipenem/cilastin  Fluids: on TPN  DVT prophylaxis: Lovenox  Started Procrit last night  NG tube out, Speech pathology consulted for swallow study    Likely to require graft once midline abdominal wound has granulated

## 2019-07-18 NOTE — PROGRESS NOTE ADULT - SUBJECTIVE AND OBJECTIVE BOX
Follow Up:  Perforated Diverticulitis    Interval History: Febrile to 100.6 overnight. Lethargic today. Continues to have minimal ostomy output. Continues to have cough but not producing sputum.     REVIEW OF SYSTEMS  [ x ] ROS unobtainable because:  encephalopathy  [  ] All other systems negative except as noted below:	    Constitutional:  [ ] fever [ ] chills  [ ] weight loss  [ ] weakness  Skin:  [ ] rash [ ] phlebitis	  Eyes: [ ] icterus [ ] pain  [ ] discharge	  ENMT: [ ] sore throat  [ ] thrush [ ] ulcers [ ] exudates  Respiratory: [ ] dyspnea [ ] hemoptysis [ ] cough [ ] sputum	  Cardiovascular:  [ ] chest pain [ ] palpitations [ ] edema	  Gastrointestinal:  [ ] nausea [ ] vomiting [ ] diarrhea [ ] constipation [ ] pain	  Genitourinary:  [ ] dysuria [ ] frequency [ ] hematuria [ ] discharge [ ] flank pain  [ ] incontinence  Musculoskeletal:  [ ] myalgias [ ] arthralgias [ ] arthritis  [ ] back pain  Neurological:  [ ] headache [ ] seizures  [ ] confusion/altered mental status  Psychiatric:  [ ] anxiety [ ] depression	  Hematology/Lymphatics:  [ ] lymphadenopathy  Endocrine:  [ ] adrenal [ ] thyroid  Allergic/Immunologic:	 [ ] transplant [ ] seasonal    Allergies  Coreg (Other)  digoxin (Other; Short breath (Mild to Mod))  penicillins (Hives)        ANTIMICROBIALS:  imipenem/cilastatin  IVPB    imipenem/cilastatin  IVPB 500 every 6 hours  nystatin    Suspension 767893 two times a day      OTHER MEDS:  MEDICATIONS  (STANDING):  ALBUTerol/ipratropium for Nebulization 3 every 6 hours  buDESOnide    Inhalation Suspension 0.5 every 12 hours  enalaprilat Injectable 2.5 every 6 hours  enoxaparin Injectable 85 two times a day  epoetin dejuan Injectable 82547 <User Schedule>  furosemide   Injectable 20 daily  insulin lispro (HumaLOG) corrective regimen sliding scale  every 6 hours  pantoprazole  Injectable 40 two times a day      Vital Signs Last 24 Hrs  T(C): 36.8 (18 Jul 2019 16:00), Max: 38.1 (18 Jul 2019 03:00)  T(F): 98.3 (18 Jul 2019 16:00), Max: 100.6 (18 Jul 2019 03:00)  HR: 110 (18 Jul 2019 18:00) (103 - 127)  BP: 125/59 (18 Jul 2019 18:00) (95/57 - 148/69)  BP(mean): 82 (18 Jul 2019 18:00) (69 - 99)  RR: 25 (18 Jul 2019 18:00) (23 - 46)  SpO2: 91% (18 Jul 2019 18:00) (90% - 98%)    PHYSICAL EXAMINATION:  General: Awake but lethargic, NAD  HEENT: PERRL, EOMI, No subconjunctival hemorrhages, Oropharynx Clear, MMM  Neck: Supple, No SARAHI  Cardiac: RRR, No M/R/G  Resp: CTAB, No Wh/Rh/Ra  Abdomen: Obese, +RLQ ostomy with minimal stool output. +midline wound vac with serosanguinous drainage, +2 drains on left side of abdomen. Bottom drain with serosanguinous material draining but upper drain with purulent/sanguinous drainage (less cloudy today), NBS, ND, minimal abdominal pain today, No HSM, No rigidity or guarding  MSK: trace to 1+ LE edema and 2+ bilateral UE edema. No stigmata of IE. No evidence of phlebitis. No evidence of synovitis.  : + starr  Skin: No rashes or lesions. Skin is warm and dry to the touch.   Neuro: Awake but lethargic CN 2-12 Grossly intact. Moves all four extremities spontaneously.  Psych: Unable to assess due to lethargy                              7.8    15.3  )-----------( 378      ( 18 Jul 2019 05:03 )             24.5       07-18    137  |  102  |  31<H>  ----------------------------<  157<H>  4.3   |  23  |  0.58    Ca    8.1<L>      18 Jul 2019 03:07  Phos  3.1     07-18  Mg     2.2     07-18            MICROBIOLOGY:  v  .Blood  07-10-19   No growth at 5 days.  --  --      .Blood  07-10-19   No growth at 5 days.  --  --      .Body Fluid abdominal fluid  07-09-19   Few Enterococcus faecalis  Few Coag Negative Staphylococcus  --  Enterococcus faecalis  Coag Negative Staphylococcus      .Body Fluid abdominal fluid  07-09-19   Few Enterococcus faecalis  Few Coag Negative Staphylococcus  --  Enterococcus faecalis  Coag Negative Staphylococcus      .Surgical Swab None Specimen Source:  abdomen, esw  07-02-19   Moderate Escherichia coli  Moderate Alpha hemolytic strep "Susceptibilities not performed"  Moderate Strep mitis oralis group "Susceptibilities not performed"  Growth in fluid media only Enterococcus faecalis  --  Escherichia coli  Enterococcus faecalis      .Blood  07-01-19   No growth at 5 days.  --  --      .Urine  07-01-19   No growth  --  --          Rapid RVP Result: NotDetec (07-16 @ 14:44)        RADIOLOGY:    EXAM:  CT ABDOMEN AND PELVIS OC IC                        EXAM:  CT ANGIO CHEST (W)AW IC                        PROCEDURE DATE:  07/16/2019    No central or main pulmonary embolism. Evaluation of the lobar, segmental and subsegmental pulmonary arteries is precluded by streak and motion artifact.  Status post left hemicolectomy with a right lower quadrant colostomy.   Large open anterior abdominal wall containing mildly dilated loops of small bowel without discrete transition, likely ileus versus early/partial small bowel obstruction.  Small abdominopelvic ascites without walled off collection.  A stable 0.6 cm left upper lobe pulmonary nodule since March 2018.  A trace right pleural effusion. Small mucosal debris in the trachea and mainstem bronchi with a couple impacted left lower lobe bronchi.  Abnormally thickened endometrium to 1.2 cm. Recommend further evaluation with pelvic ultrasound

## 2019-07-18 NOTE — PROGRESS NOTE ADULT - SUBJECTIVE AND OBJECTIVE BOX
Interval Events:    lopressor 5mg d/c'd due to HF concerns, but persistently tachycardic overnight  NG tube discontinued  Some bloody output from ostomy site, but ostomy site pink and viable on dressing takedown    S: Patient doing well, denies fevers, chills, nausea, emesis, chest pain, SOB.  Pain is well controlled.  Still minimal/no ostomy output    O: Vital Signs  T(C): 38.1 (07-18 @ 03:00), Max: 38.1 (07-18 @ 03:00)  HR: 122 (07-18 @ 04:00) (102 - 127)  BP: 108/56 (07-18 @ 04:00) (103/60 - 142/66)  RR: 27 (07-18 @ 04:00) (26 - 46)  SpO2: 93% (07-18 @ 04:00) (90% - 97%)  07-16-19 @ 07:01  -  07-17-19 @ 07:00  --------------------------------------------------------  IN: 2840.9 mL / OUT: 1940 mL / NET: 900.9 mL    07-17-19 @ 07:01  -  07-18-19 @ 05:07  --------------------------------------------------------  IN: 2525.6 mL / OUT: 1320 mL / NET: 1205.6 mL      General: alert and oriented, NAD.    Resp: airway patent, respirations unlabored.  On 3l via NC  CVS: regular rate and rhythm  Abdomen: soft, nontender, nondistended BASIM drains x2 with serous output.  Vac with good suction over midline wound.  Extremities: no edema  Skin: warm, dry, appropriate color                          7.3    14.4  )-----------( 279      ( 16 Jul 2019 09:06 )             21.3   07-18    137  |  102  |  31<H>  ----------------------------<  157<H>  4.3   |  23  |  0.58    Ca    8.1<L>      18 Jul 2019 03:07  Phos  3.1     07-18  Mg     2.2     07-18    TPro  6.4  /  Alb  2.3<L>  /  TBili  0.4  /  DBili  0.2  /  AST  19  /  ALT  11  /  AlkPhos  149<H>  07-16

## 2019-07-19 LAB
ANION GAP SERPL CALC-SCNC: 11 MMOL/L — SIGNIFICANT CHANGE UP (ref 5–17)
BUN SERPL-MCNC: 34 MG/DL — HIGH (ref 7–23)
CALCIUM SERPL-MCNC: 7.9 MG/DL — LOW (ref 8.4–10.5)
CHLORIDE SERPL-SCNC: 104 MMOL/L — SIGNIFICANT CHANGE UP (ref 96–108)
CO2 SERPL-SCNC: 22 MMOL/L — SIGNIFICANT CHANGE UP (ref 22–31)
CREAT SERPL-MCNC: 0.57 MG/DL — SIGNIFICANT CHANGE UP (ref 0.5–1.3)
GLUCOSE BLDC GLUCOMTR-MCNC: 141 MG/DL — HIGH (ref 70–99)
GLUCOSE BLDC GLUCOMTR-MCNC: 145 MG/DL — HIGH (ref 70–99)
GLUCOSE BLDC GLUCOMTR-MCNC: 159 MG/DL — HIGH (ref 70–99)
GLUCOSE BLDC GLUCOMTR-MCNC: 165 MG/DL — HIGH (ref 70–99)
GLUCOSE SERPL-MCNC: 159 MG/DL — HIGH (ref 70–99)
HCT VFR BLD CALC: 23.1 % — LOW (ref 34.5–45)
HGB BLD-MCNC: 7.1 G/DL — LOW (ref 11.5–15.5)
MAGNESIUM SERPL-MCNC: 1.7 MG/DL — SIGNIFICANT CHANGE UP (ref 1.6–2.6)
MCHC RBC-ENTMCNC: 30.7 GM/DL — LOW (ref 32–36)
MCHC RBC-ENTMCNC: 32.5 PG — SIGNIFICANT CHANGE UP (ref 27–34)
MCV RBC AUTO: 106 FL — HIGH (ref 80–100)
PHOSPHATE SERPL-MCNC: 3 MG/DL — SIGNIFICANT CHANGE UP (ref 2.5–4.5)
PLATELET # BLD AUTO: 288 K/UL — SIGNIFICANT CHANGE UP (ref 150–400)
POTASSIUM SERPL-MCNC: 3.6 MMOL/L — SIGNIFICANT CHANGE UP (ref 3.5–5.3)
POTASSIUM SERPL-SCNC: 3.6 MMOL/L — SIGNIFICANT CHANGE UP (ref 3.5–5.3)
RBC # BLD: 2.19 M/UL — LOW (ref 3.8–5.2)
RBC # FLD: 20.5 % — HIGH (ref 10.3–14.5)
SODIUM SERPL-SCNC: 137 MMOL/L — SIGNIFICANT CHANGE UP (ref 135–145)
TROPONIN T, HIGH SENSITIVITY RESULT: 42 NG/L — SIGNIFICANT CHANGE UP (ref 0–51)
WBC # BLD: 13.9 K/UL — HIGH (ref 3.8–10.5)
WBC # FLD AUTO: 13.9 K/UL — HIGH (ref 3.8–10.5)

## 2019-07-19 PROCEDURE — 99233 SBSQ HOSP IP/OBS HIGH 50: CPT

## 2019-07-19 PROCEDURE — 93010 ELECTROCARDIOGRAM REPORT: CPT

## 2019-07-19 PROCEDURE — 99232 SBSQ HOSP IP/OBS MODERATE 35: CPT

## 2019-07-19 RX ORDER — HYDROMORPHONE HYDROCHLORIDE 2 MG/ML
0.25 INJECTION INTRAMUSCULAR; INTRAVENOUS; SUBCUTANEOUS ONCE
Refills: 0 | Status: DISCONTINUED | OUTPATIENT
Start: 2019-07-19 | End: 2019-07-19

## 2019-07-19 RX ORDER — I.V. FAT EMULSION 20 G/100ML
20.8 EMULSION INTRAVENOUS
Qty: 50 | Refills: 0 | Status: DISCONTINUED | OUTPATIENT
Start: 2019-07-19 | End: 2019-07-20

## 2019-07-19 RX ORDER — POTASSIUM CHLORIDE 20 MEQ
10 PACKET (EA) ORAL
Refills: 0 | Status: COMPLETED | OUTPATIENT
Start: 2019-07-19 | End: 2019-07-19

## 2019-07-19 RX ORDER — FERROUS SULFATE 325(65) MG
325 TABLET ORAL DAILY
Refills: 0 | Status: DISCONTINUED | OUTPATIENT
Start: 2019-07-19 | End: 2019-08-02

## 2019-07-19 RX ORDER — ELECTROLYTE SOLUTION,INJ
1 VIAL (ML) INTRAVENOUS
Refills: 0 | Status: DISCONTINUED | OUTPATIENT
Start: 2019-07-19 | End: 2019-07-19

## 2019-07-19 RX ORDER — MAGNESIUM SULFATE 500 MG/ML
2 VIAL (ML) INJECTION ONCE
Refills: 0 | Status: COMPLETED | OUTPATIENT
Start: 2019-07-19 | End: 2019-07-19

## 2019-07-19 RX ORDER — ACETAMINOPHEN 500 MG
1000 TABLET ORAL ONCE
Refills: 0 | Status: COMPLETED | OUTPATIENT
Start: 2019-07-19 | End: 2019-07-19

## 2019-07-19 RX ORDER — POTASSIUM CHLORIDE 20 MEQ
40 PACKET (EA) ORAL DAILY
Refills: 0 | Status: DISCONTINUED | OUTPATIENT
Start: 2019-07-20 | End: 2019-07-20

## 2019-07-19 RX ADMIN — Medication 500000 UNIT(S): at 17:08

## 2019-07-19 RX ADMIN — Medication 0.5 MILLIGRAM(S): at 17:49

## 2019-07-19 RX ADMIN — Medication 2: at 17:15

## 2019-07-19 RX ADMIN — PANTOPRAZOLE SODIUM 40 MILLIGRAM(S): 20 TABLET, DELAYED RELEASE ORAL at 05:05

## 2019-07-19 RX ADMIN — Medication 20 MILLIGRAM(S): at 05:05

## 2019-07-19 RX ADMIN — Medication 50 GRAM(S): at 05:06

## 2019-07-19 RX ADMIN — Medication 1 EACH: at 17:56

## 2019-07-19 RX ADMIN — Medication 3 MILLILITER(S): at 05:18

## 2019-07-19 RX ADMIN — Medication 2: at 12:07

## 2019-07-19 RX ADMIN — Medication 0.5 MILLIGRAM(S): at 05:18

## 2019-07-19 RX ADMIN — Medication 400 MILLIGRAM(S): at 10:00

## 2019-07-19 RX ADMIN — Medication 500000 UNIT(S): at 05:05

## 2019-07-19 RX ADMIN — ENOXAPARIN SODIUM 85 MILLIGRAM(S): 100 INJECTION SUBCUTANEOUS at 17:09

## 2019-07-19 RX ADMIN — Medication 2.5 MILLIGRAM(S): at 11:55

## 2019-07-19 RX ADMIN — IMIPENEM AND CILASTATIN 100 MILLIGRAM(S): 250; 250 INJECTION, POWDER, FOR SOLUTION INTRAVENOUS at 17:09

## 2019-07-19 RX ADMIN — IMIPENEM AND CILASTATIN 100 MILLIGRAM(S): 250; 250 INJECTION, POWDER, FOR SOLUTION INTRAVENOUS at 11:55

## 2019-07-19 RX ADMIN — Medication 2.5 MILLIGRAM(S): at 17:09

## 2019-07-19 RX ADMIN — Medication 100 MILLIEQUIVALENT(S): at 05:11

## 2019-07-19 RX ADMIN — Medication 1000 MILLIGRAM(S): at 10:30

## 2019-07-19 RX ADMIN — Medication 100 MILLIEQUIVALENT(S): at 07:59

## 2019-07-19 RX ADMIN — Medication 3 MILLILITER(S): at 17:50

## 2019-07-19 RX ADMIN — ERYTHROPOIETIN 20000 UNIT(S): 10000 INJECTION, SOLUTION INTRAVENOUS; SUBCUTANEOUS at 20:57

## 2019-07-19 RX ADMIN — Medication 3 MILLILITER(S): at 23:55

## 2019-07-19 RX ADMIN — PANTOPRAZOLE SODIUM 40 MILLIGRAM(S): 20 TABLET, DELAYED RELEASE ORAL at 17:08

## 2019-07-19 RX ADMIN — CHLORHEXIDINE GLUCONATE 1 APPLICATION(S): 213 SOLUTION TOPICAL at 05:06

## 2019-07-19 RX ADMIN — IMIPENEM AND CILASTATIN 100 MILLIGRAM(S): 250; 250 INJECTION, POWDER, FOR SOLUTION INTRAVENOUS at 05:06

## 2019-07-19 RX ADMIN — ENOXAPARIN SODIUM 85 MILLIGRAM(S): 100 INJECTION SUBCUTANEOUS at 05:06

## 2019-07-19 RX ADMIN — Medication 100 MILLIEQUIVALENT(S): at 08:46

## 2019-07-19 RX ADMIN — I.V. FAT EMULSION 20.8 ML/HR: 20 EMULSION INTRAVENOUS at 17:56

## 2019-07-19 RX ADMIN — Medication 3 MILLILITER(S): at 11:13

## 2019-07-19 RX ADMIN — Medication 2.5 MILLIGRAM(S): at 07:31

## 2019-07-19 RX ADMIN — Medication 3 MILLILITER(S): at 00:43

## 2019-07-19 NOTE — PROGRESS NOTE ADULT - ATTENDING COMMENTS
seen and examined 07-19-19 @ 1620    NPO  no nausea or vomiting    soft / NT / ND  colostomy bag full of air  bilateral LE edema 1/3 to knees      sepsis from perforated diverticulitis with intraabdominal hypertension  7/2 - s/p Lupe's / ABThera  7/3 - s/p washout / ABThera  7/5 - washout / ABThera    moderate protein calorie malnutrition  -continue TPN at goal until tolerating adequate oral nutrition    type 2 DM  -continue insulin 60 units    hypernatremia with fluid overload  -continue NaCl 0 mEq and NaAcetate 0 mEq  -continue total volume 2000 mL

## 2019-07-19 NOTE — PROGRESS NOTE ADULT - SUBJECTIVE AND OBJECTIVE BOX
Interval Events: failed swallow study    S: Patient doing well, denies fevers, chills, nausea, emesis, chest pain, SOB.  Pain is well controlled.  Still minimal/no ostomy output    O: Vital Signs  T(C): 36.7 (07-18 @ 23:00), Max: 38.1 (07-18 @ 03:00)  HR: 106 (07-19 @ 00:45) (103 - 125)  BP: 123/59 (07-18 @ 23:00) (95/57 - 148/69)  RR: 25 (07-18 @ 23:00) (23 - 44)  SpO2: 100% (07-19 @ 00:45) (91% - 100%)  07-17-19 @ 07:01  -  07-18-19 @ 07:00  --------------------------------------------------------  IN: 2733.2 mL / OUT: 1335 mL / NET: 1398.2 mL    07-18-19 @ 07:01  -  07-19-19 @ 01:49  --------------------------------------------------------  IN: 1756.6 mL / OUT: 1315 mL / NET: 441.6 mL      General: alert and oriented, NAD.    Resp: airway patent, respirations unlabored.  On 3l via NC  CVS: regular rate and rhythm  Abdomen: soft, nontender, nondistended BASIM drains x2 with serous output.  Vac with good suction over midline wound.  Extremities: no edema  Skin: warm, dry, appropriate color                        7.8    15.3  )-----------( 378      ( 18 Jul 2019 05:03 )             24.5   07-18    137  |  102  |  31<H>  ----------------------------<  157<H>  4.3   |  23  |  0.58    Ca    8.1<L>      18 Jul 2019 03:07  Phos  3.1     07-18  Mg     2.2     07-18 Interval Events: failed swallow study, started passing gas and stool in ostomy, rec'd extra dose lasix 20 IV, tachycardia o/n    S: Patient doing well, denies fevers, chills, nausea, emesis, chest pain, SOB.  Pain is well controlled.  Still minimal/no ostomy output    O: Vital Signs  T(C): 36.7 (07-18 @ 23:00), Max: 38.1 (07-18 @ 03:00)  HR: 106 (07-19 @ 00:45) (103 - 125)  BP: 123/59 (07-18 @ 23:00) (95/57 - 148/69)  RR: 25 (07-18 @ 23:00) (23 - 44)  SpO2: 100% (07-19 @ 00:45) (91% - 100%)  07-17-19 @ 07:01  -  07-18-19 @ 07:00  --------------------------------------------------------  IN: 2733.2 mL / OUT: 1335 mL / NET: 1398.2 mL    07-18-19 @ 07:01  -  07-19-19 @ 01:49  --------------------------------------------------------  IN: 1756.6 mL / OUT: 1315 mL / NET: 441.6 mL      General: alert and oriented, NAD.    Resp: airway patent, respirations unlabored.  On 3l via NC  CVS: regular rate and rhythm  Abdomen: soft, nontender, nondistended BASIM drains x2 with serous output.  Vac with good suction over midline wound. stoma pink  Extremities: no edema  Skin: warm, dry, appropriate color                        7.8    15.3  )-----------( 378      ( 18 Jul 2019 05:03 )             24.5   07-18    137  |  102  |  31<H>  ----------------------------<  157<H>  4.3   |  23  |  0.58    Ca    8.1<L>      18 Jul 2019 03:07  Phos  3.1     07-18  Mg     2.2     07-18 Interval Events: failed swallow study, started passing gas and stool in ostomy, rec'd extra dose lasix 20 IV, tachycardia o/n    S: Patient doing well, denies fevers, chills, nausea, emesis, chest pain, SOB.  Pain is well controlled.     O: Vital Signs  T(C): 36.7 (07-18 @ 23:00), Max: 38.1 (07-18 @ 03:00)  HR: 106 (07-19 @ 00:45) (103 - 125)  BP: 123/59 (07-18 @ 23:00) (95/57 - 148/69)  RR: 25 (07-18 @ 23:00) (23 - 44)  SpO2: 100% (07-19 @ 00:45) (91% - 100%)  07-17-19 @ 07:01  -  07-18-19 @ 07:00  --------------------------------------------------------  IN: 2733.2 mL / OUT: 1335 mL / NET: 1398.2 mL    07-18-19 @ 07:01  -  07-19-19 @ 01:49  --------------------------------------------------------  IN: 1756.6 mL / OUT: 1315 mL / NET: 441.6 mL      General: alert and oriented, NAD.    Resp: airway patent, respirations unlabored.  On 3l via NC  CVS: regular rate and rhythm  Abdomen: soft, nontender, nondistended BASIM drains x2 with serous output.  Vac with good suction over midline wound. stoma pink  Extremities: no edema  Skin: warm, dry, appropriate color                        7.8    15.3  )-----------( 378      ( 18 Jul 2019 05:03 )             24.5   07-18    137  |  102  |  31<H>  ----------------------------<  157<H>  4.3   |  23  |  0.58    Ca    8.1<L>      18 Jul 2019 03:07  Phos  3.1     07-18  Mg     2.2     07-18

## 2019-07-19 NOTE — PROGRESS NOTE ADULT - ASSESSMENT
A/P:74F admitted with abdominal pain and exam findings consistent with peritonitis secondary to acute diverticulitis at the splenic flexure with evidence of contained perforation now s/p Exploratory laparotomy, left hemicolectomy, end colostomy, and ABThera Vac placement and s/p from RTOR for abdominal washout and Abthera vac placement, off pressors. TPN started 7/5 for Protein-Calorie Malnutrition    - TPN formula @ goal: Carbohydrates: 210grams, Amino Acid: 120grams, 40g lipids in 2000 ml  - Strict Intake and Output.,   - Weights three times a week  - Monitor BMP, Mg, Ionized Ca, Phosphorus daily  - Pre-albumin weekly.  - Hyperglycemia- will increase insulin from 55U to 60U today.  Continue sliding scale coverage.   - Hyperkalemia - KCl decreased from 100 meq to 60 meq today.  - HyperNa-stabilized;  No NaAce or NaCl in TPN  - Anemia- pt is a Jehov[employer] Witness- continue mngt per primary team, Hematology note appreciated  - Continue as per SICU/ Surgery d/w team, will follow with you    TPN team, pager [phone], spectra [phone]  D/w Adela Cross

## 2019-07-19 NOTE — SWALLOW BEDSIDE ASSESSMENT ADULT - COMMENTS
SICU 7/1: SICU - CT scan showed Acute diverticulitis at the splenic flexure with evidence of contained perforation. No drainable regional collection. Increased ascites in the deep pelvis. She was transferred to the SICU for further HD monitoring. +Septic shock with DIANNA stage 2 on CKD 3, hypotension requiring pressor support, altered mental status, lactic acidosis, and peritonitis in setting of acute diverticulitis and bowel perforation, with steroid used for possible allergic reaction on 6/30. 7/2: OR for exploratory laparotomy, left hemicolectomy, end colostomy, and ABThera VAC placement. Transferred back to the SICU immediately postop requiring high doses of norepinephrine, vasopressin.  Pt with multiple RTOR for abdominal washout and abthera VAC placement (7/3, 7/5, 7/9). Pt s/p OR for closure with strattice mesh and wound VAC. placement 7/11. Pt NPO on TPN. 7/15: Heart failure NP: plan to continue TPN given low albumin and inability to feed patient enterally due to ileus. 7/16: She returned to the SICU due to respiratory distress. 7/18: discussed Pt with team – concern re: recent aspiration event while on regular floor, therefore, transferred back to 8ICU due to respiratory distress. CXR 7/16: the lungs are clear. CT Abd/Pelvis 7/16: A trace right pleural effusion. Small mucosal debris in the trachea and mainstem bronchi with a couple impacted left lower lobe bronchi. Large open anterior abdominal wall containing mildly dilated loops of small bowel without discrete transition, likely ileus versus early/partial small bowel obstruction. Multiple intubations/extubations: Intubated 7/2, extubated 7/8; re-intubated 7/9 & extubated 7/10; reintubated 7/11 & extubated 7/12.  Swallowing History: 8/30/16: patient was intubated x1 month, subsequent trach and PEG. Rehab with FEES completed to clear patient for Puree diet; tolerating diet without difficulty for ~2 months. Puree diet maintained due to edentulous, unable to wear dentures 2/2 weight loss.

## 2019-07-19 NOTE — PROGRESS NOTE ADULT - ATTENDING COMMENTS
Patient seen and examined and agree with above.  Overnight patient started to experience bowel function and today she passed her speech and swallow exam and was started on thickened clear liquid diet.   Current management includes:  1) sinus tachycardia- will monitor and allow permissive tachycardia unless it is >125 bpm persistently  -denies chest pain or dyspnea    2) Hyperglycemia with DM type 2- continue insulin in TPN  -will start to wean off the TPN once able to tolerated a full diet  -monitor blood glucose with goal BG < 180    3) systolic heart failure - continue current medications  - appreciate HF team recommendations    4) Chronic atrial fibrillation- continue therapeautic lovenox    Procrit for 5 days for anemia.

## 2019-07-19 NOTE — SWALLOW BEDSIDE ASSESSMENT ADULT - SWALLOW EVAL: DIAGNOSIS
Pt presents with improvement in overall arousal and swallow function today as compared to previous encounter on 7/18. Pt tolerated trials of puree thin/thick and honey thickened liquids with fairly timely swallow initiation, mildly reduced laryngeal elevation upon palpation. Pt with immediate throat clears post nectar thickened liquids, suggestive of penetration/aspiration.

## 2019-07-19 NOTE — SWALLOW BEDSIDE ASSESSMENT ADULT - SLP GENERAL OBSERVATIONS
Chart reviewed, SKIP August called earlier today to request re-evaluation due to significant improvement in arousal. Pt encountered OOB in chair, on 1L O2 via NC. SPO2 97%, , RR 17. Vitals remained stable throughout evaluation. No increase in WOB. Pt AA&Ox4, pleasant and cooperative. Able to sustain arousal without difficulty. Pt's daughter stated that Pt usually sleeps during the day and wakes up in the afternoon. Once diet initiated, discussed with Pt, daughter and team re: only feeding patient when alert/awake, otherwise holding trays.

## 2019-07-19 NOTE — PROGRESS NOTE ADULT - SUBJECTIVE AND OBJECTIVE BOX
HISTORY  74y Female with history of HFrEF s/p AICD, asthma, HTN, HLD, atrial fibrillation on Eliquis presented with severe, sudden onset L sided abd pain which started on the morning of 6/30/19 after eating. She endorsed nausea at the time but no vomiting. She did not have any fevers, chills, diarrhea or hematochezia.  CTAP w/ IV contrast showed proximal descending colon/splenic flexure diverticulitis with small amount of free fluid and 3cm low attenuation structure in the right adnexa. She developed dyspnea and wheezing after the CT scan and became hypoxic. Bedside US int he ED demonstrated a few B lines. She received 80mg total of IV L.asix, 125 of solumedrol, Duonebs x2 and Narcan and she was admitted to MICU for management of respiratory and heart failure.     Through the next day (7/1), she was persistently tachycardic with BPs as low as 76/50, MAPs in low 60s. She was bolused with 500cc of crystalloid without improvement in blood pressure. She was then started on phenylephrine. Her lactate was uptrending from 2 to 2.6 to 3 on the arterial blood gas. A repeat CT scan showed acute diverticulitis at the splenic flexure with evidence of contained perforation, but no drainable collection. 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 Pt was left open with Abthera vac. She was taken back to the OR multiple times 7/3, 7/5, and 7/9) for washout of purulent ascites, but was unable to be closed secondary to edematous bowel. She was diuresed and then on 7/11 she was taken to OR and closed with strattice mesh. 2 Wolfgang drains left in place, and wound vac placed over mesh. She returned to SICU intubated on 7/11.    On 7/16, patient developed progressive shortness of breath and reported chest pain. Concern for PE given with hx of A-fib and being off AC for the past 2 weeks. She was started on lovenox on 7/15. She returned to the SICU due to respiratory distress.     24 HOUR EVENTS:  -started passing gas and stool in ostomy  -Failed speech and swallow study  -given extra dose of lasix 20 IV  -Episodes of tachycardia overnight    SUBJECTIVE/ROS:  [ ] A ten-point review of systems was otherwise negative except as noted.  [ ] Due to altered mental status/intubation, subjective information were not able to be obtained from the patient. History was obtained, to the extent possible, from review of the chart and collateral sources of information.      NEURO  Exam: awake, alert, oriented  Meds:   [x] Adequacy of sedation and pain control has been assessed and adjusted      RESPIRATORY  RR: 27 (07-19-19 @ 02:00) (22 - 44)  SpO2: 100% (07-19-19 @ 02:00) (91% - 100%)  Wt(kg): --  Exam: unlabored, clear to auscultation bilaterally  Mechanical Ventilation:     [N/A] Extubation Readiness Assessed  Meds: ALBUTerol/ipratropium for Nebulization 3 milliLiter(s) Nebulizer every 6 hours  buDESOnide    Inhalation Suspension 0.5 milliGRAM(s) Inhalation every 12 hours        CARDIOVASCULAR  HR: 108 (07-19-19 @ 02:00) (103 - 123)  BP: 124/58 (07-19-19 @ 02:00) (95/57 - 148/69)  BP(mean): 83 (07-19-19 @ 02:00) (69 - 99)  ABP: --  ABP(mean): --  Wt(kg): --  CVP(cm H2O): --      Exam: regular rate and rhythm  Cardiac Rhythm: sinus  Perfusion     [x]Adequate   [ ]Inadequate  Mentation   [x]Normal       [ ]Reduced  Extremities  [x]Warm         [ ]Cool  Volume Status [ ]Hypervolemic [x]Euvolemic [ ]Hypovolemic  Meds: enalaprilat Injectable 2.5 milliGRAM(s) IV Push every 6 hours  furosemide   Injectable 20 milliGRAM(s) IV Push daily        GI/NUTRITION  Exam: soft, nontender, nondistended, vac site C/D/I, air and stool in ostomy  Diet: NPO  Meds: pantoprazole  Injectable 40 milliGRAM(s) IV Push two times a day      GENITOURINARY  I&O's Detail    07-17 @ 07:01  -  07-18 @ 07:00  --------------------------------------------------------  IN:    fat emulsion (Fish Oil and Plant Based) 20% Infusion: 291.2 mL    Solution: 250 mL    Solution: 100 mL    Solution: 100 mL    TPN (Total Parenteral Nutrition): 1992 mL  Total IN: 2733.2 mL    OUT:    Drain: 15 mL    Drain: 20 mL    Voided: 1300 mL  Total OUT: 1335 mL    Total NET: 1398.2 mL      07-18 @ 07:01  -  07-19 @ 03:11  --------------------------------------------------------  IN:    fat emulsion (Fish Oil and Plant Based) 20% Infusion: 145.6 mL    Solution: 200 mL    TPN (Total Parenteral Nutrition): 1411 mL  Total IN: 1756.6 mL    OUT:    Drain: 10 mL    Drain: 5 mL    Voided: 1300 mL  Total OUT: 1315 mL    Total NET: 441.6 mL          07-18    137  |  102  |  31<H>  ----------------------------<  157<H>  4.3   |  23  |  0.58    Ca    8.1<L>      18 Jul 2019 03:07  Phos  3.1     07-18  Mg     2.2     07-18      [ ] Ching catheter, indication: N/A  Meds: fat emulsion (Fish Oil and Plant Based) 20% Infusion 20.8 mL/Hr IV Continuous <Continuous>  Parenteral Nutrition - Adult 1 Each TPN Continuous <Continuous>        HEMATOLOGIC  Meds: enoxaparin Injectable 85 milliGRAM(s) SubCutaneous two times a day    [x] VTE Prophylaxis                        7.8    15.3  )-----------( 378      ( 18 Jul 2019 05:03 )             24.5       Transfusion     [ ] PRBC   [ ] Platelets   [ ] FFP   [ ] Cryoprecipitate      INFECTIOUS DISEASES  WBC Count: 15.3 K/uL (07-18 @ 05:03)    RECENT CULTURES:    Meds: epoetin dejuan Injectable 85647 Unit(s) SubCutaneous <User Schedule>  imipenem/cilastatin  IVPB      imipenem/cilastatin  IVPB 500 milliGRAM(s) IV Intermittent every 6 hours  nystatin    Suspension 538175 Unit(s) Oral two times a day        ENDOCRINE  CAPILLARY BLOOD GLUCOSE      POCT Blood Glucose.: 155 mg/dL (18 Jul 2019 22:57)  POCT Blood Glucose.: 111 mg/dL (18 Jul 2019 17:12)  POCT Blood Glucose.: 164 mg/dL (18 Jul 2019 12:08)  POCT Blood Glucose.: 154 mg/dL (18 Jul 2019 05:49)    Meds: insulin lispro (HumaLOG) corrective regimen sliding scale   SubCutaneous every 6 hours        ACCESS DEVICES:  [x] Peripheral IV  [ ] Central Venous Line	[ ] R	[ ] L	[ ] IJ	[ ] Fem	[ ] SC	Placed:   [ ] Arterial Line		[ ] R	[ ] L	[ ] Fem	[ ] Rad	[ ] Ax	Placed:   [x] PICC:					[ ] Mediport  [ ] Urinary Catheter, Date Placed:   [x] Necessity of urinary, arterial, and venous catheters discussed    OTHER MEDICATIONS:  benzocaine 15 mG/menthol 3.6 mG Lozenge 1 Lozenge Oral three times a day PRN  chlorhexidine 2% Cloths 1 Application(s) Topical daily      CODE STATUS: Full code      IMAGING:

## 2019-07-19 NOTE — PROGRESS NOTE ADULT - ASSESSMENT
Assessment:    ANDRE BARKER is a 74y Female with PMH of asthma, A fib, HFrEF on POD 15 from L hemicolectomy and end colostomy with VAC placement along with multiple washouts and closure with bridging mesh.     Plan:    Abx: Imipenem/cilastin  Fluids: on TPN  DVT prophylaxis: Lovenox  Continue Procrit  Continue vasotec, lasix, duoneb, pulmicort  Continue aggressive chest PT  NG tube out, NPO, failed swallow study    Green [id number]

## 2019-07-19 NOTE — SWALLOW BEDSIDE ASSESSMENT ADULT - SLP PERTINENT HISTORY OF CURRENT PROBLEM
74 F [employer] (cannot receive blood transfusions) with PMHx of HFrEF s/p AICD, asthma, HTN, HLD, atrial fibrillation on eliquis p/w severe, sudden onset L sided abd pain that started morning of 6/30. Patient had just finished her meal when she developed left sided abdominal pain. She reports nausea but no vomiting. Upon arrival to ED, patient with stable vitals. She received morphine for pain. Shortly after, +palpitations and SOB. She started to have wheezing and became hypoxic. Patient admitted to MICU for management of respiratory and heart failure.

## 2019-07-19 NOTE — PROGRESS NOTE ADULT - SUBJECTIVE AND OBJECTIVE BOX
[facility] NUTRITION SUPPORT / TPN -- FOLLOW UP NOTE  --------------------------------------------------------------------------------    24 hour events/subjective:  - Discontinued 5 mg lopressor as per Cardiology, continues to be tachycardic in the 120s  - Discontinued NG tube  - On 3 liters nasal cannula overnight   - 5 cc of bloody output with clots in the colostomy. Colostomy bag taken down and area around ostomy showed no signs of bleeding. Ostomy bud was pink.   - Febrile to 100.6F, follow up CBC this morning. Already receiving imipenem/cilastatin   - CT abdomen and pelvis showed dilated loops of small bowel without discrete transition  - Attempted by SICU team to get pt OOB to chair - patient too sleepy- will try later- daughter states that her mom is more nocturnal- pt noted to be more alert & cooperative in the evening  - Procrit initiated          Diet:  Diet, NPO:   Except Medications (07-09-19 @ 14:09)      Appetite: [x  ]Poor [  ]Adequate [  ]Good  Caloric intake:  [ x  ]  Adequate   [   ] Inadequate    ROS: General/ GI see HPI  all other systems negative      ALLERGIES & MEDICATIONS  --------------------------------------------------------------------------------  ALLERGIES    Coreg (Other)  digoxin (Other; Short breath (Mild to Mod))  penicillins (Hives)    Intolerances    metoprolol (Other)  Solu-Medrol (Other (Mild to Mod))    STANDING INPATIENT MEDICATIONS    ALBUTerol/ipratropium for Nebulization 3 milliLiter(s) Nebulizer every 6 hours  buDESOnide    Inhalation Suspension 0.5 milliGRAM(s) Inhalation every 12 hours  chlorhexidine 2% Cloths 1 Application(s) Topical daily  enalaprilat Injectable 2.5 milliGRAM(s) IV Push every 6 hours  enoxaparin Injectable 85 milliGRAM(s) SubCutaneous two times a day  epoetin dejuan Injectable 91145 Unit(s) SubCutaneous <User Schedule>  fat emulsion (Fish Oil and Plant Based) 20% Infusion 20.8 mL/Hr IV Continuous <Continuous>  furosemide   Injectable 20 milliGRAM(s) IV Push daily  imipenem/cilastatin  IVPB      imipenem/cilastatin  IVPB 500 milliGRAM(s) IV Intermittent every 6 hours  insulin lispro (HumaLOG) corrective regimen sliding scale   SubCutaneous every 6 hours  nystatin    Suspension [id number] Unit(s) Oral two times a day  pantoprazole  Injectable 40 milliGRAM(s) IV Push two times a day  Parenteral Nutrition - Adult 1 Each TPN Continuous <Continuous>  Parenteral Nutrition - Adult 1 Each TPN Continuous <Continuous>      PRN INPATIENT MEDICATION  benzocaine 15 mG/menthol 3.6 mG Lozenge 1 Lozenge Oral three times a day PRN        VITALS/PHYSICAL EXAM  --------------------------------------------------------------------------------  T(C): 36.8 (07-19-19 @ 11:00), Max: 36.9 (07-19-19 @ 03:00)  HR: 97 (07-19-19 @ 14:00) (93 - 116)  BP: 116/54 (07-19-19 @ 14:00) (98/51 - 145/60)  RR: 13 (07-19-19 @ 14:00) (13 - 33)  SpO2: 99% (07-19-19 @ 14:00) (91% - 100%)  Wt(kg): --        07-18-19 @ 07:01  -  07-19-19 @ 07:00  --------------------------------------------------------  IN: 2513.4 mL / OUT: 1915 mL / NET: 598.4 mL    07-19-19 @ 07:01  -  07-19-19 @ 14:41  --------------------------------------------------------  IN: 981 mL / OUT: 600 mL / NET: 381 mL    Physical Exam:    	Gen: NAD, NGT in place, sleepy but arousable  	HEENT: NC/AT, PERRL, mucosa moist, supple neck, clear oropharynx  	Chest: clear  	GI: +softly distended, nontender, no BS                  (+)Ostomy pink & viable. liquid & soft formed stool                  VAC intact w/ good seal, drains w/ serosanguinous drainage              :  (+)Ching              MSK/Vascular: Passive ROM x4,  no clubbing, cyanosis, nor edema                   RUE PICC dressing C/D/I w/o SOI  	Neuro: Awake and alert  	Skin: Warm, good turgor, without rashes        LABS/ CULTURES/ RADIOLOGY:              7.1    13.9  >-----------<  288      [07-19-19 @ 03:53]              23.1     137  |  104  |  34  ----------------------------<  159      [07-19-19 @ 03:53]  3.6   |  22  |  0.57        Ca     7.9     [07-19-19 @ 03:53]      Mg     1.7     [07-19-19 @ 03:53]      Phos  3.0     [07-19-19 @ 03:53]        CAPILLARY BLOOD GLUCOSE  POCT Blood Glucose.: 165 mg/dL (19 Jul 2019 12:02)  POCT Blood Glucose.: 141 mg/dL (19 Jul 2019 04:54)  POCT Blood Glucose.: 155 mg/dL (18 Jul 2019 22:57)  POCT Blood Glucose.: 111 mg/dL (18 Jul 2019 17:12)    Prealbumin, Serum: 5 mg/dL (07-04-19 @ 22:45)

## 2019-07-19 NOTE — PROGRESS NOTE ADULT - ASSESSMENT
ASSESSMENT  74F admitted with abdominal pain and exam findings consistent with peritonitis secondary to acute diverticulitis at the splenic flexure with evidence of contained perforation s/p Exploratory laparotomy, left hemicolectomy, end colostomy, and ABThera Vac placement (7/2) and s/p multiple RTOR for abdominal washout and abthera vac placement (7/3, 7/5, 7/9), closure with strattice mesh and wound vac placement (7/11) Returned to SICU on 7/16 due to respiratory distress.    PLAN:  NEURO: acute pain control  - Pain control with Dilaudid PRN and IV Tylenol    RESPIRATORY:  PMHx of Asthma; worsening SOB and CP on 7/14; ABG wnl on RA  - CXR improving, on exam bronchial congestion which improved with treatments   - Continue Duonebs and pulmicort  - chest PT, IS  - Appreciate Pulm recs    CARDIOVASCULAR:  atrial fibrillation, HFrEF s/p AICD, non-sustained runs of V-tach   - discontinued Metoprolol 5mg q 6hrs   - Hold home anti-hypertensives, on vasotec  - Trend daily weight   - Lasix PRN to keep Net negative     GI/NUTRITION: s/p ex lap and L hemicolectomy with end colostomy and ABThera VAC placement and washout 7/2, s/p closure with strattice mesh and wound vac 7/11  - NPO  - Continue TPN   - Protonix for stress ulcer prophylaxis  - having ostomy function, evaluate speech and swallow    GENITOURINARY/RENAL: CKD stage 3  - Primafit to monitor I&Os  - Continue diuresis as needed  - 20 mg lasix daily     HEMATOLOGIC:  * Patient is Bahai & does NOT accept blood products*  - Anemia work up sent  - Appreciate Heme recs   - Started Procrit 20,000 SQ QD x5 days   - Minimize blood draws - use pediatric tubes  - iron sucrose IVPB  - t- Lovenox     INFECTIOUS DISEASE: Enterococcus in abdominal fluid culture  - Complete course of imipenem/cilastatin 500mg IV Q6H on 7/20  - F/u WBC in AM CBC  - Repeat blood cultures if febrile    ENDOCRINE: DM   - ISS  - insulin in TPN     DISPO: SICU. Full code.    SICU  [id number]

## 2019-07-20 LAB
GLUCOSE BLDC GLUCOMTR-MCNC: 137 MG/DL — HIGH (ref 70–99)
GLUCOSE BLDC GLUCOMTR-MCNC: 140 MG/DL — HIGH (ref 70–99)
GLUCOSE BLDC GLUCOMTR-MCNC: 147 MG/DL — HIGH (ref 70–99)
GLUCOSE BLDC GLUCOMTR-MCNC: 153 MG/DL — HIGH (ref 70–99)

## 2019-07-20 PROCEDURE — 99232 SBSQ HOSP IP/OBS MODERATE 35: CPT

## 2019-07-20 PROCEDURE — 99233 SBSQ HOSP IP/OBS HIGH 50: CPT

## 2019-07-20 RX ORDER — I.V. FAT EMULSION 20 G/100ML
20.8 EMULSION INTRAVENOUS
Qty: 50 | Refills: 0 | Status: DISCONTINUED | OUTPATIENT
Start: 2019-07-20 | End: 2019-07-21

## 2019-07-20 RX ORDER — INSULIN LISPRO 100/ML
VIAL (ML) SUBCUTANEOUS
Refills: 0 | Status: DISCONTINUED | OUTPATIENT
Start: 2019-07-20 | End: 2019-07-24

## 2019-07-20 RX ORDER — INSULIN LISPRO 100/ML
VIAL (ML) SUBCUTANEOUS AT BEDTIME
Refills: 0 | Status: DISCONTINUED | OUTPATIENT
Start: 2019-07-20 | End: 2019-07-24

## 2019-07-20 RX ORDER — FUROSEMIDE 40 MG
40 TABLET ORAL EVERY 24 HOURS
Refills: 0 | Status: DISCONTINUED | OUTPATIENT
Start: 2019-07-20 | End: 2019-08-02

## 2019-07-20 RX ORDER — POTASSIUM CHLORIDE 20 MEQ
40 PACKET (EA) ORAL DAILY
Refills: 0 | Status: COMPLETED | OUTPATIENT
Start: 2019-07-20 | End: 2019-07-21

## 2019-07-20 RX ORDER — ACETAMINOPHEN 500 MG
1000 TABLET ORAL ONCE
Refills: 0 | Status: COMPLETED | OUTPATIENT
Start: 2019-07-20 | End: 2019-07-21

## 2019-07-20 RX ORDER — ELECTROLYTE SOLUTION,INJ
1 VIAL (ML) INTRAVENOUS
Refills: 0 | Status: DISCONTINUED | OUTPATIENT
Start: 2019-07-20 | End: 2019-07-20

## 2019-07-20 RX ORDER — PANTOPRAZOLE SODIUM 20 MG/1
40 TABLET, DELAYED RELEASE ORAL
Refills: 0 | Status: DISCONTINUED | OUTPATIENT
Start: 2019-07-20 | End: 2019-08-02

## 2019-07-20 RX ORDER — SPIRONOLACTONE 25 MG/1
25 TABLET, FILM COATED ORAL DAILY
Refills: 0 | Status: DISCONTINUED | OUTPATIENT
Start: 2019-07-20 | End: 2019-08-02

## 2019-07-20 RX ORDER — SIMVASTATIN 20 MG/1
20 TABLET, FILM COATED ORAL AT BEDTIME
Refills: 0 | Status: DISCONTINUED | OUTPATIENT
Start: 2019-07-20 | End: 2019-08-02

## 2019-07-20 RX ORDER — MONTELUKAST 4 MG/1
10 TABLET, CHEWABLE ORAL EVERY 24 HOURS
Refills: 0 | Status: DISCONTINUED | OUTPATIENT
Start: 2019-07-20 | End: 2019-08-02

## 2019-07-20 RX ADMIN — I.V. FAT EMULSION 20.8 ML/HR: 20 EMULSION INTRAVENOUS at 17:45

## 2019-07-20 RX ADMIN — Medication 500000 UNIT(S): at 17:15

## 2019-07-20 RX ADMIN — Medication 10 MILLIGRAM(S): at 06:00

## 2019-07-20 RX ADMIN — MONTELUKAST 10 MILLIGRAM(S): 4 TABLET, CHEWABLE ORAL at 11:07

## 2019-07-20 RX ADMIN — Medication 40 MILLIGRAM(S): at 06:00

## 2019-07-20 RX ADMIN — SIMVASTATIN 20 MILLIGRAM(S): 20 TABLET, FILM COATED ORAL at 22:23

## 2019-07-20 RX ADMIN — PANTOPRAZOLE SODIUM 40 MILLIGRAM(S): 20 TABLET, DELAYED RELEASE ORAL at 17:15

## 2019-07-20 RX ADMIN — ENOXAPARIN SODIUM 85 MILLIGRAM(S): 100 INJECTION SUBCUTANEOUS at 06:01

## 2019-07-20 RX ADMIN — Medication 40 MILLIEQUIVALENT(S): at 11:12

## 2019-07-20 RX ADMIN — Medication 325 MILLIGRAM(S): at 11:11

## 2019-07-20 RX ADMIN — Medication 10 MILLIGRAM(S): at 17:14

## 2019-07-20 RX ADMIN — ERYTHROPOIETIN 20000 UNIT(S): 10000 INJECTION, SOLUTION INTRAVENOUS; SUBCUTANEOUS at 22:24

## 2019-07-20 RX ADMIN — Medication 3 MILLILITER(S): at 05:35

## 2019-07-20 RX ADMIN — IMIPENEM AND CILASTATIN 100 MILLIGRAM(S): 250; 250 INJECTION, POWDER, FOR SOLUTION INTRAVENOUS at 11:08

## 2019-07-20 RX ADMIN — SPIRONOLACTONE 25 MILLIGRAM(S): 25 TABLET, FILM COATED ORAL at 07:13

## 2019-07-20 RX ADMIN — ENOXAPARIN SODIUM 85 MILLIGRAM(S): 100 INJECTION SUBCUTANEOUS at 17:15

## 2019-07-20 RX ADMIN — Medication 500000 UNIT(S): at 06:00

## 2019-07-20 RX ADMIN — Medication 1 EACH: at 17:45

## 2019-07-20 RX ADMIN — Medication 3 MILLILITER(S): at 17:17

## 2019-07-20 RX ADMIN — Medication 2.5 MILLIGRAM(S): at 00:02

## 2019-07-20 RX ADMIN — Medication 0.5 MILLIGRAM(S): at 17:17

## 2019-07-20 RX ADMIN — Medication 2: at 06:02

## 2019-07-20 RX ADMIN — IMIPENEM AND CILASTATIN 100 MILLIGRAM(S): 250; 250 INJECTION, POWDER, FOR SOLUTION INTRAVENOUS at 06:01

## 2019-07-20 RX ADMIN — IMIPENEM AND CILASTATIN 100 MILLIGRAM(S): 250; 250 INJECTION, POWDER, FOR SOLUTION INTRAVENOUS at 00:00

## 2019-07-20 RX ADMIN — PANTOPRAZOLE SODIUM 40 MILLIGRAM(S): 20 TABLET, DELAYED RELEASE ORAL at 06:01

## 2019-07-20 RX ADMIN — IMIPENEM AND CILASTATIN 100 MILLIGRAM(S): 250; 250 INJECTION, POWDER, FOR SOLUTION INTRAVENOUS at 17:15

## 2019-07-20 RX ADMIN — CHLORHEXIDINE GLUCONATE 1 APPLICATION(S): 213 SOLUTION TOPICAL at 06:02

## 2019-07-20 RX ADMIN — Medication 3 MILLILITER(S): at 11:45

## 2019-07-20 RX ADMIN — Medication 0.5 MILLIGRAM(S): at 05:35

## 2019-07-20 NOTE — PROGRESS NOTE ADULT - ATTENDING COMMENTS
seen and examined 07-20-19 @ 1825    tolerating minimal Thick & Easy  no nausea or vomiting    soft / NT / ND  colostomy bag with air and stool  bilateral LE edema to just above ankles      sepsis from perforated diverticulitis with intraabdominal hypertension  7/2 - s/p Lupe's / ABThera  7/3 - s/p washout / ABThera  7/5 - washout / ABThera    moderate protein calorie malnutrition  -continue TPN at goal until tolerating adequate oral nutrition    type 2 DM  -continue insulin 60 units    hypernatremia with fluid overload  -continue NaCl 0 mEq and NaAcetate 0 mEq  -continue total volume 2000 mL seen and examined 07-20-19 @ 1825    tolerating minimal Thick & Easy  no nausea or vomiting    soft / NT / ND  colostomy bag with air and stool  bilateral LE edema to just above ankles      sepsis from perforated diverticulitis with intraabdominal hypertension  7/2 - s/p Lupe's / ABThera  7/3 - s/p washout / ABThera  7/5 - washout / ABThera    moderate protein calorie malnutrition  -continue TPN at goal until tolerating adequate oral nutrition    type 2 DM  -continue insulin 60 units    fluid overload  -continue NaCl 0 mEq and NaAc 0 mEq

## 2019-07-20 NOTE — PROGRESS NOTE ADULT - ASSESSMENT
A/P:74F admitted with abdominal pain and exam findings consistent with peritonitis secondary to acute diverticulitis at the splenic flexure with evidence of contained perforation now s/p Exploratory laparotomy, left hemicolectomy, end colostomy, and ABThera Vac placement and s/p from RTOR for abdominal washout and Abthera vac placement, off pressors. TPN started 7/5 for Protein-Calorie Malnutrition    - TPN formula @ goal: Carbohydrates: 210grams, Amino Acid: 120grams, 40g lipids in 2000 ml  - Strict Intake and Output.,   - Weights three times a week  - Monitor BMP, Mg, Ionized Ca, Phosphorus daily  - Pre-albumin weekly.  - Hyperglycemia- will increase insulin from 55U to 60U today.  Continue sliding scale coverage.   - Hyperkalemia - KCl decreased from 100 meq to 60 meq today.  - HyperNa-stabilized;  No NaAce or NaCl in TPN  - Anemia- pt is a Jehovah Witness- continue mngmt per primary team, Hematology note appreciated  - Continue as per SICU/ Surgery d/w team, will follow with you    TPN team, pager [phone], spectra [phone]  D/w Adela Cross A/P:74F admitted with abdominal pain and exam findings consistent with peritonitis secondary to acute diverticulitis at the splenic flexure with evidence of contained perforation now s/p Exploratory laparotomy, left hemicolectomy, end colostomy, and ABThera Vac placement and s/p from RTOR for abdominal washout and Abthera vac placement, off pressors. TPN started 7/5 for Protein-Calorie Malnutrition    - TPN formula @ goal: Carbohydrates: 210grams, Amino Acid: 120grams, 40g lipids in 2000 ml, may possibly decrease tomorrow if tolerating more of her Dysphagia I diet.  - Strict Intake and Output.,   - Weights three times a week  - Monitor BMP, Mg, Ionized Ca, Phosphorus daily  - Pre-albumin weekly.  - Hyperglycemia- will increase insulin from 55U to 60U today.  Continue sliding scale coverage.   - Hypokalemia - KCl @ 60 meq today.  - HyperNa-stabilized;  No NaAce or NaCl in TPN  - Anemia- pt is a Jehovah Witness- continue mngmt per primary team, Hematology note appreciated  - Continue as per SICU/ Surgery d/w team, will follow with you    TPN team, pager [phone], spectra [phone]  D/w Adela Cross

## 2019-07-20 NOTE — PROGRESS NOTE ADULT - ASSESSMENT
Assessment:    ANDRE BARKER is a 74y Female with PMH of asthma, A fib, HFrEF on POD 15 from L hemicolectomy and end colostomy with VAC placement along with multiple washouts and closure with bridging mesh.     Plan:    Abx: impenem/cilastin  Diet: Clears  continue procrit  Continue aggresive chest PT Assessment:    ANDRE BARKER is a 74y Female with PMH of asthma, A fib, HFrEF on POD 15 from L hemicolectomy and end colostomy with VAC placement along with multiple washouts and closure with bridging mesh. Clinical condition improving.  Tolerated some clear.  Will continue to attempt to advance diet, slowly.  Possible transfer to the floor over the weekend, pending clinical course.     Plan:    Abx: impenem/cilastin  Diet: Clears  continue procrit  Continue aggressive chest PT

## 2019-07-20 NOTE — PROGRESS NOTE ADULT - SUBJECTIVE AND OBJECTIVE BOX
[facility] NUTRITION SUPPORT--  Attending/ PA FOLLOW UP NOTE      24 hour events/subjective: Denies Palpitations, chest pain, shortness of breath. Denies nausea nor vomiting nor abdominal pain.        PAST HISTORY  --------------------------------------------------------------------------------  No significant changes to PMH, PSH, FHx, SHx, unless otherwise noted    ALLERGIES & MEDICATIONS  --------------------------------------------------------------------------------  Allergies    Coreg (Other)  digoxin (Other; Short breath (Mild to Mod))  penicillins (Hives)    Intolerances    metoprolol (Other)  Solu-Medrol (Other (Mild to Mod))    Standing Inpatient Medications  ALBUTerol/ipratropium for Nebulization 3 milliLiter(s) Nebulizer every 6 hours  buDESOnide    Inhalation Suspension 0.5 milliGRAM(s) Inhalation every 12 hours  chlorhexidine 2% Cloths 1 Application(s) Topical daily  enalapril 10 milliGRAM(s) Oral every 12 hours  enoxaparin Injectable 85 milliGRAM(s) SubCutaneous two times a day  epoetin dejuan Injectable 90813 Unit(s) SubCutaneous <User Schedule>  ferrous    sulfate 325 milliGRAM(s) Oral daily  furosemide    Tablet 40 milliGRAM(s) Oral every 24 hours  imipenem/cilastatin  IVPB      imipenem/cilastatin  IVPB 500 milliGRAM(s) IV Intermittent every 6 hours  insulin lispro (HumaLOG) corrective regimen sliding scale   SubCutaneous three times a day before meals  insulin lispro (HumaLOG) corrective regimen sliding scale   SubCutaneous at bedtime  montelukast 10 milliGRAM(s) Oral every 24 hours  nystatin    Suspension 300749 Unit(s) Oral two times a day  pantoprazole    Tablet 40 milliGRAM(s) Oral two times a day  Parenteral Nutrition - Adult 1 Each TPN Continuous <Continuous>  potassium chloride    Tablet ER 40 milliEquivalent(s) Oral daily  simvastatin 20 milliGRAM(s) Oral at bedtime  spironolactone 25 milliGRAM(s) Oral daily    PRN Inpatient Medications      REVIEW OF SYSTEMS  --------------------------------------------------------------------------------  Gen: as per HPI  Skin: No rashes  Head/Eyes/Ears/Mouth: No headache;No sore throat  Respiratory: No dyspnea, cough,   CV: No chest pain, PND, orthopnea  GI: as per HPI  : No increased frequency, dysuria, hematuria, nocturia  MSK: No joint pain/swelling; no back pain; no edema  Neuro: No dizziness/lightheadedness, weakness, seizures, numbness, tingling  Psych: No significant nervousness, anxiety, stress, depression    All other systems were reviewed and are negative, except as noted.      LABS/STUDIES  --------------------------------------------------------------------------------              7.1    13.9  >-----------<  288      [07-19-19 @ 03:53]              23.1     137  |  104  |  34  ----------------------------<  159      [07-19-19 @ 03:53]  3.6   |  22  |  0.57        Ca     7.9     [07-19-19 @ 03:53]      Mg     1.7     [07-19-19 @ 03:53]      Phos  3.0     [07-19-19 @ 03:53]                PrealbuminPrealbumin, Serum: 5 mg/dL (07-04-19 @ 22:45)    Triglycerides      07-19-19 @ 07:01  -  07-20-19 @ 07:00  --------------------------------------------------------  IN: 3119.4 mL / OUT: 1890 mL / NET: 1229.4 mL    07-20-19 @ 07:01  -  07-20-19 @ 09:15  --------------------------------------------------------  IN: 166 mL / OUT: 0 mL / NET: 166 mL        VITALS/PHYSICAL EXAM  --------------------------------------------------------------------------------  T(C): 37.2 (07-20-19 @ 07:00), Max: 38.1 (07-19-19 @ 19:17)  HR: 108 (07-20-19 @ 09:00) (93 - 128)  BP: 129/60 (07-20-19 @ 09:00) (98/51 - 140/57)  RR: 26 (07-20-19 @ 09:00) (13 - 44)  SpO2: 98% (07-20-19 @ 09:00) (94% - 100%)  Wt(kg): --    Physical Exam:    	Gen: NAD, NGT in place, sleepy but arousable  	HEENT: NC/AT, PERRL, mucosa moist, supple neck, clear oropharynx  	Chest: clear  	GI: +softly distended, nontender, no BS                  (+)Ostomy pink & viable. liquid & soft formed stool                  VAC intact w/ good seal, drains w/ serosanguinous drainage              :  (+)Ching              MSK/Vascular: Passive ROM x4,  no clubbing, cyanosis, nor edema                   RUE PICC dressing C/D/I w/o SOI  	Neuro: Awake and alert  	Skin: Warm, good turgor, without rashes [facility] NUTRITION SUPPORT--  Attending/ PA FOLLOW UP NOTE      24 hour events/subjective: Denies Palpitations, chest pain, shortness of breath. Denies nausea nor vomiting nor abdominal pain. Reports some pain where emmy VAC is.    24 HOUR EVENTS:  - Advanced to Dysphagia I, CLD  - Restarted Aldactone, Simvastatin       PAST HISTORY  --------------------------------------------------------------------------------  No significant changes to PMH, PSH, FHx, SHx, unless otherwise noted    ALLERGIES & MEDICATIONS  --------------------------------------------------------------------------------  Allergies    Coreg (Other)  digoxin (Other; Short breath (Mild to Mod))  penicillins (Hives)    Intolerances    metoprolol (Other)  Solu-Medrol (Other (Mild to Mod))    Standing Inpatient Medications  ALBUTerol/ipratropium for Nebulization 3 milliLiter(s) Nebulizer every 6 hours  buDESOnide    Inhalation Suspension 0.5 milliGRAM(s) Inhalation every 12 hours  chlorhexidine 2% Cloths 1 Application(s) Topical daily  enalapril 10 milliGRAM(s) Oral every 12 hours  enoxaparin Injectable 85 milliGRAM(s) SubCutaneous two times a day  epoetin dejuan Injectable 26201 Unit(s) SubCutaneous <User Schedule>  ferrous    sulfate 325 milliGRAM(s) Oral daily  furosemide    Tablet 40 milliGRAM(s) Oral every 24 hours  imipenem/cilastatin  IVPB      imipenem/cilastatin  IVPB 500 milliGRAM(s) IV Intermittent every 6 hours  insulin lispro (HumaLOG) corrective regimen sliding scale   SubCutaneous three times a day before meals  insulin lispro (HumaLOG) corrective regimen sliding scale   SubCutaneous at bedtime  montelukast 10 milliGRAM(s) Oral every 24 hours  nystatin    Suspension 440138 Unit(s) Oral two times a day  pantoprazole    Tablet 40 milliGRAM(s) Oral two times a day  Parenteral Nutrition - Adult 1 Each TPN Continuous <Continuous>  potassium chloride    Tablet ER 40 milliEquivalent(s) Oral daily  simvastatin 20 milliGRAM(s) Oral at bedtime  spironolactone 25 milliGRAM(s) Oral daily    PRN Inpatient Medications      REVIEW OF SYSTEMS  --------------------------------------------------------------------------------  Gen: as per HPI  Skin: No rashes  Head/Eyes/Ears/Mouth: No headache;No sore throat  Respiratory: No dyspnea, cough,   CV: No chest pain, PND, orthopnea  GI: as per HPI  : No increased frequency, dysuria, hematuria, nocturia  MSK: No joint pain/swelling; no back pain; no edema  Neuro: No dizziness/lightheadedness, weakness, seizures, numbness, tingling  Psych: No significant nervousness, anxiety, stress, depression    All other systems were reviewed and are negative, except as noted.      LABS/STUDIES  --------------------------------------------------------------------------------              7.1    13.9  >-----------<  288      [07-19-19 @ 03:53]              23.1     137  |  104  |  34  ----------------------------<  159      [07-19-19 @ 03:53]  3.6   |  22  |  0.57        Ca     7.9     [07-19-19 @ 03:53]      Mg     1.7     [07-19-19 @ 03:53]      Phos  3.0     [07-19-19 @ 03:53]                PrealbuminPrealbumin, Serum: 5 mg/dL (07-04-19 @ 22:45)    Triglycerides      07-19-19 @ 07:01  -  07-20-19 @ 07:00  --------------------------------------------------------  IN: 3119.4 mL / OUT: 1890 mL / NET: 1229.4 mL    07-20-19 @ 07:01  -  07-20-19 @ 09:15  --------------------------------------------------------  IN: 166 mL / OUT: 0 mL / NET: 166 mL        VITALS/PHYSICAL EXAM  --------------------------------------------------------------------------------  T(C): 37.2 (07-20-19 @ 07:00), Max: 38.1 (07-19-19 @ 19:17)  HR: 108 (07-20-19 @ 09:00) (93 - 128)  BP: 129/60 (07-20-19 @ 09:00) (98/51 - 140/57)  RR: 26 (07-20-19 @ 09:00) (13 - 44)  SpO2: 98% (07-20-19 @ 09:00) (94% - 100%)  Wt(kg): --    Physical Exam:    	Gen: NAD, NGT in place, sleepy but arousable  	HEENT: NC/AT, PERRL, mucosa moist, supple neck, clear oropharynx  	Chest: clear  	GI: +softly distended, nontender, no BS                  (+)Ostomy pink & viable. liquid & soft formed stool                  VAC intact w/ good seal, drains w/ serosanguinous drainage              :  (+)Ching              MSK/Vascular: Passive ROM x4,  no clubbing, cyanosis, nor edema                   RUE PICC dressing C/D/I w/o SOI  	Neuro: Awake and alert  	Skin: Warm, good turgor, without rashes [facility] NUTRITION SUPPORT--  Attending/ PA FOLLOW UP NOTE      24 hour events/subjective: Denies Palpitations, chest pain, shortness of breath. Denies nausea nor vomiting nor abdominal pain. Reports some pain where emmy VAC is.    24 HOUR EVENTS:  - Advanced to Dysphagia I, CLD  - Restarted Aldactone, Simvastatin       PAST HISTORY  --------------------------------------------------------------------------------  No significant changes to PMH, PSH, FHx, SHx, unless otherwise noted    ALLERGIES & MEDICATIONS  --------------------------------------------------------------------------------  Allergies    Coreg (Other)  digoxin (Other; Short breath (Mild to Mod))  penicillins (Hives)    Intolerances    metoprolol (Other)  Solu-Medrol (Other (Mild to Mod))    Standing Inpatient Medications  ALBUTerol/ipratropium for Nebulization 3 milliLiter(s) Nebulizer every 6 hours  buDESOnide    Inhalation Suspension 0.5 milliGRAM(s) Inhalation every 12 hours  chlorhexidine 2% Cloths 1 Application(s) Topical daily  enalapril 10 milliGRAM(s) Oral every 12 hours  enoxaparin Injectable 85 milliGRAM(s) SubCutaneous two times a day  epoetin dejuan Injectable 74446 Unit(s) SubCutaneous <User Schedule>  ferrous    sulfate 325 milliGRAM(s) Oral daily  furosemide    Tablet 40 milliGRAM(s) Oral every 24 hours  imipenem/cilastatin  IVPB      imipenem/cilastatin  IVPB 500 milliGRAM(s) IV Intermittent every 6 hours  insulin lispro (HumaLOG) corrective regimen sliding scale   SubCutaneous three times a day before meals  insulin lispro (HumaLOG) corrective regimen sliding scale   SubCutaneous at bedtime  montelukast 10 milliGRAM(s) Oral every 24 hours  nystatin    Suspension 546548 Unit(s) Oral two times a day  pantoprazole    Tablet 40 milliGRAM(s) Oral two times a day  Parenteral Nutrition - Adult 1 Each TPN Continuous <Continuous>  potassium chloride    Tablet ER 40 milliEquivalent(s) Oral daily  simvastatin 20 milliGRAM(s) Oral at bedtime  spironolactone 25 milliGRAM(s) Oral daily    PRN Inpatient Medications      REVIEW OF SYSTEMS  --------------------------------------------------------------------------------  Gen: as per HPI  Skin: No rashes  Head/Eyes/Ears/Mouth: No headache;No sore throat  Respiratory: No dyspnea, cough,   CV: No chest pain, PND, orthopnea  GI: as per HPI  : No increased frequency, dysuria, hematuria, nocturia  MSK: No joint pain/swelling; no back pain; no edema  Neuro: No dizziness/lightheadedness, weakness, seizures, numbness, tingling  Psych: No significant nervousness, anxiety, stress, depression    All other systems were reviewed and are negative, except as noted.      LABS/STUDIES  --------------------------------------------------------------------------------              7.1    13.9  >-----------<  288      [07-19-19 @ 03:53]              23.1     137  |  104  |  34  ----------------------------<  159      [07-19-19 @ 03:53]  3.6   |  22  |  0.57        Ca     7.9     [07-19-19 @ 03:53]      Mg     1.7     [07-19-19 @ 03:53]      Phos  3.0     [07-19-19 @ 03:53]                PrealbuminPrealbumin, Serum: 5 mg/dL (07-04-19 @ 22:45)    Triglycerides      07-19-19 @ 07:01  -  07-20-19 @ 07:00  --------------------------------------------------------  IN: 3119.4 mL / OUT: 1890 mL / NET: 1229.4 mL    07-20-19 @ 07:01  -  07-20-19 @ 09:15  --------------------------------------------------------  IN: 166 mL / OUT: 0 mL / NET: 166 mL        VITALS/PHYSICAL EXAM  --------------------------------------------------------------------------------  T(C): 37.2 (07-20-19 @ 07:00), Max: 38.1 (07-19-19 @ 19:17)  HR: 108 (07-20-19 @ 09:00) (93 - 128)  BP: 129/60 (07-20-19 @ 09:00) (98/51 - 140/57)  RR: 26 (07-20-19 @ 09:00) (13 - 44)  SpO2: 98% (07-20-19 @ 09:00) (94% - 100%)  Wt(kg): --    Physical Exam:    	Gen: NAD,  sleepy but arousable  	HEENT: NC/AT, PERRL, mucosa moist, supple neck, clear oropharynx  	Chest: clear  	GI: +softly distended, nontender, no BS                  (+)Ostomy pink & viable. liquid & soft formed stool                  VAC intact w/ good seal, drains w/ serosanguinous drainage              :  (+)Ching              MSK/Vascular: Passive ROM x4,  no clubbing, cyanosis, nor edema                   RUE PICC dressing C/D/I w/o SOI  	Neuro: Awake and alert  	Skin: Warm, good turgor, without rashes

## 2019-07-20 NOTE — PROGRESS NOTE ADULT - ASSESSMENT
ASSESSMENT  74F admitted with abdominal pain and exam findings consistent with peritonitis secondary to acute diverticulitis at the splenic flexure with evidence of contained perforation s/p Exploratory laparotomy, left hemicolectomy, end colostomy, and ABThera Vac placement (7/2) and s/p multiple RTOR for abdominal washout and abthera vac placement (7/3, 7/5, 7/9), closure with strattice mesh and wound vac placement (7/11) Returned to SICU on 7/16 due to respiratory distress.    PLAN:  NEURO: acute pain control  - Pain control with Dilaudid PRN and IV Tylenol    RESPIRATORY:  PMHx of Asthma; worsening SOB and CP on 7/14; ABG wnl on RA  - Continue Duonebs and Pulmicort Montelukast   - chest PT, IS  - Appreciate Pulm recs    CARDIOVASCULAR:  atrial fibrillation, HFrEF s/p AICD, non-sustained runs of V-tach   - discontinued Metoprolol 5mg q 6hrs   - Continue Enalapril for hypertension, Afterload reduction   - Trend daily weight   - Lasix 40mg daily, with Aldactone 25mg daily    GI/NUTRITION: s/p ex lap and L hemicolectomy with end colostomy and ABThera VAC placement and washout 7/2, s/p closure with strattice mesh and wound vac 7/11  - Continue Dysphagia I, CLD, Will advance as per primary team  If tolerates diet, will discontinue TPN   - Protonix for GERD  - having ostomy function    GENITOURINARY/RENAL: CKD stage 3  - Primafit to monitor I&Os  - Continue diuresis as needed  - 40mg lasix daily     HEMATOLOGIC:  * Patient is [facility] & does NOT accept blood products*  - Anemia work up sent  - Appreciate Heme recs   - Started Procrit 20,000 SQ QD x5 days   - Minimize blood draws - use pediatric tubes  - 325mg Ferrous sulfate daily  - t- Lovenox     INFECTIOUS DISEASE: Enterococcus in abdominal fluid culture  - Complete course of imipenem/cilastatin 500mg IV Q6H on 7/20  - F/u WBC in AM CBC  - Repeat blood cultures if febrile    ENDOCRINE: DM   - ISS  - insulin in TPN     DISPO: Will transfer to floor. Full Code    Pedro Grady PA-C  Critical Care Physician Assistant  [facility] #[id number]

## 2019-07-20 NOTE — PROGRESS NOTE ADULT - SUBJECTIVE AND OBJECTIVE BOX
HISTORY  74y Female with history of HFrEF s/p AICD, asthma, HTN, HLD, atrial fibrillation on Eliquis presented with severe, sudden onset L sided abd pain which started on the morning of 6/30/19 after eating. She endorsed nausea at the time but no vomiting. She did not have any fevers, chills, diarrhea or hematochezia.  CTAP w/ IV contrast showed proximal descending colon/splenic flexure diverticulitis with small amount of free fluid and 3cm low attenuation structure in the right adnexa. She developed dyspnea and wheezing after the CT scan and became hypoxic. Bedside US int he ED demonstrated a few B lines. She received 80mg total of IV L.asix, 125 of solumedrol, Duonebs x2 and Narcan and she was admitted to MICU for management of respiratory and heart failure.     Through the next day (7/1), she was persistently tachycardic with BPs as low as 76/50, MAPs in low 60s. She was bolused with 500cc of crystalloid without improvement in blood pressure. She was then started on phenylephrine. Her lactate was uptrending from 2 to 2.6 to 3 on the arterial blood gas. A repeat CT scan showed acute diverticulitis at the splenic flexure with evidence of contained perforation, but no drainable collection. 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 Pt was left open with Abthera vac. She was taken back to the OR multiple times 7/3, 7/5, and 7/9) for washout of purulent ascites, but was unable to be closed secondary to edematous bowel. She was diuresed and then on 7/11 she was taken to OR and closed with strattice mesh. 2 Wolfgang drains left in place, and wound vac placed over mesh. She returned to SICU intubated on 7/11.    On 7/16, patient developed progressive shortness of breath and reported chest pain. Concern for PE given with hx of A-fib and being off AC for the past 2 weeks. She was started on lovenox on 7/15. She returned to the SICU due to respiratory distress.       24 HOUR EVENTS:  - Advanced to Dysphagia I, CLD  - Restarted Aldactone, Simvastatin     SUBJECTIVE/ROS:  [x ] A ten-point review of systems was otherwise negative except as noted.  [ ] Due to altered mental status/intubation, subjective information were not able to be obtained from the patient. History was obtained, to the extent possible, from review of the chart and collateral sources of information.      NEURO  RASS:     GCS:     CAM ICU:  Exam: awake, alert, oriented x 4  Meds:   [x] Adequacy of sedation and pain control has been assessed and adjusted      RESPIRATORY  RR: 42 (07-20-19 @ 04:00) (13 - 44)  SpO2: 94% (07-20-19 @ 04:00) (94% - 100%)  Wt(kg): --  Exam: unlabored, clear to auscultation bilaterally, without wheeze  Mechanical Ventilation:     [N/A] Extubation Readiness Assessed  Meds: ALBUTerol/ipratropium for Nebulization 3 milliLiter(s) Nebulizer every 6 hours  buDESOnide    Inhalation Suspension 0.5 milliGRAM(s) Inhalation every 12 hours        CARDIOVASCULAR  HR: 128 (07-20-19 @ 04:00) (93 - 128)  BP: 137/66 (07-20-19 @ 04:00) (98/51 - 140/57)  BP(mean): 95 (07-20-19 @ 04:00) (73 - 104)  ABP: --  ABP(mean): --  Wt(kg): --  CVP(cm H2O): --      Exam: regular rate and rhythm  Cardiac Rhythm: sinus  Perfusion     [x]Adequate   [ ]Inadequate  Mentation   [x]Normal       [ ]Reduced  Extremities  [x]Warm         [ ]Cool  Volume Status [ ]Hypervolemic [x]Euvolemic [ ]Hypovolemic  Meds: enalaprilat Injectable 2.5 milliGRAM(s) IV Push every 6 hours  furosemide   Injectable 20 milliGRAM(s) IV Push daily        GI/NUTRITION  Exam: soft, nontender, nondistended, vac site C/D/I, air and stool in ostomy  Diet: Dysphagia I, Clear liquid diet  Meds: pantoprazole  Injectable 40 milliGRAM(s) IV Push two times a day      GENITOURINARY  I&O's Detail    07-18 @ 07:01  -  07-19 @ 07:00  --------------------------------------------------------  IN:    fat emulsion (Fish Oil and Plant Based) 20% Infusion: 270.4 mL    Solution: 200 mL    Solution: 300 mL    TPN (Total Parenteral Nutrition): 1743 mL  Total IN: 2513.4 mL    OUT:    Colostomy: 150 mL    Drain: 5 mL    Drain: 10 mL    Voided: 1750 mL  Total OUT: 1915 mL    Total NET: 598.4 mL      07-19 @ 07:01 - 07-20 @ 05:10  --------------------------------------------------------  IN:    fat emulsion (Fish Oil and Plant Based) 20% Infusion: 228.8 mL    Oral Fluid: 240 mL    Solution: 300 mL    Solution: 100 mL    Solution: 200 mL    TPN (Total Parenteral Nutrition): 1743 mL  Total IN: 2811.8 mL    OUT:    Colostomy: 50 mL    Drain: 20 mL    Drain: 10 mL    Voided: 1100 mL  Total OUT: 1180 mL    Total NET: 1631.8 mL          07-19    137  |  104  |  34<H>  ----------------------------<  159<H>  3.6   |  22  |  0.57    Ca    7.9<L>      19 Jul 2019 03:53  Phos  3.0     07-19  Mg     1.7     07-19      [ ] Ching catheter, indication: N/A  Meds: fat emulsion (Fish Oil and Plant Based) 20% Infusion 20.8 mL/Hr IV Continuous <Continuous>  ferrous    sulfate 325 milliGRAM(s) Oral daily  Parenteral Nutrition - Adult 1 Each TPN Continuous <Continuous>  potassium chloride    Tablet ER 40 milliEquivalent(s) Oral daily        HEMATOLOGIC  Meds: enoxaparin Injectable 85 milliGRAM(s) SubCutaneous two times a day    [x] VTE Prophylaxis                        7.1    13.9  )-----------( 288      ( 19 Jul 2019 03:53 )             23.1       Transfusion     [ ] PRBC   [ ] Platelets   [ ] FFP   [ ] Cryoprecipitate      INFECTIOUS DISEASES    RECENT CULTURES:    Meds: epoetin dejuan Injectable 41013 Unit(s) SubCutaneous <User Schedule>  imipenem/cilastatin  IVPB      imipenem/cilastatin  IVPB 500 milliGRAM(s) IV Intermittent every 6 hours  nystatin    Suspension 465301 Unit(s) Oral two times a day        ENDOCRINE  CAPILLARY BLOOD GLUCOSE      POCT Blood Glucose.: 145 mg/dL (19 Jul 2019 23:58)  POCT Blood Glucose.: 159 mg/dL (19 Jul 2019 17:13)  POCT Blood Glucose.: 165 mg/dL (19 Jul 2019 12:02)    Meds: insulin lispro (HumaLOG) corrective regimen sliding scale   SubCutaneous every 6 hours        ACCESS DEVICES:  [ ] Peripheral IV  [ ] Central Venous Line	[ ] R	[ ] L	[ ] IJ	[ ] Fem	[ ] SC	Placed:   [ ] Arterial Line		[ ] R	[ ] L	[ ] Fem	[ ] Rad	[ ] Ax	Placed:   [x ] PICC: Right Basilic 7/5					[ ] Mediport  [ ] Urinary Catheter, Date Placed:   [x] Necessity of urinary, arterial, and venous catheters discussed    OTHER MEDICATIONS:  benzocaine 15 mG/menthol 3.6 mG Lozenge 1 Lozenge Oral three times a day PRN  chlorhexidine 2% Cloths 1 Application(s) Topical daily      CODE STATUS: Full Code      IMAGING:

## 2019-07-20 NOTE — PROGRESS NOTE ADULT - SUBJECTIVE AND OBJECTIVE BOX
Interval Events:    S: Patient doing well, denies fevers, chills, nausea, emesis, chest pain, SOB.  pain well controlled.  Having ostomy function    O: Vital Signs  T(C): 37.1 (07-20 @ 03:00), Max: 38.1 (07-19 @ 19:17)  HR: 115 (07-20 @ 06:00) (93 - 128)  BP: 129/61 (07-20 @ 06:00) (98/51 - 140/57)  RR: 30 (07-20 @ 06:00) (13 - 44)  SpO2: 100% (07-20 @ 06:00) (94% - 100%)  07-18-19 @ 07:01  -  07-19-19 @ 07:00  --------------------------------------------------------  IN: 2513.4 mL / OUT: 1915 mL / NET: 598.4 mL    07-19-19 @ 07:01  -  07-20-19 @ 06:39  --------------------------------------------------------  IN: 3119.4 mL / OUT: 1880 mL / NET: 1239.4 mL      General: alert and oriented, NAD  Resp: airway patent, respirations unlabored  CVS: regular rate and rhythm  Abdomen: soft, nontender, nondistended.  Ostomy pink and viable with some stool in bag  Extremities: no edema  Skin: warm, dry, appropriate color                          7.1    13.9  )-----------( 288      ( 19 Jul 2019 03:53 )             23.1   07-19    137  |  104  |  34<H>  ----------------------------<  159<H>  3.6   |  22  |  0.57    Ca    7.9<L>      19 Jul 2019 03:53  Phos  3.0     07-19  Mg     1.7     07-19 Interval Events:    S: Patient doing well, denies fevers, chills, nausea, emesis, chest pain, SOB.  pain well controlled.  Having ostomy function.  Tolerated some clears yesterday.    O: Vital Signs  T(C): 37.1 (07-20 @ 03:00), Max: 38.1 (07-19 @ 19:17)  HR: 115 (07-20 @ 06:00) (93 - 128)  BP: 129/61 (07-20 @ 06:00) (98/51 - 140/57)  RR: 30 (07-20 @ 06:00) (13 - 44)  SpO2: 100% (07-20 @ 06:00) (94% - 100%)  07-18-19 @ 07:01  -  07-19-19 @ 07:00  --------------------------------------------------------  IN: 2513.4 mL / OUT: 1915 mL / NET: 598.4 mL    07-19-19 @ 07:01  -  07-20-19 @ 06:39  --------------------------------------------------------  IN: 3119.4 mL / OUT: 1880 mL / NET: 1239.4 mL      General: alert and oriented, NAD  Resp: airway patent, respirations unlabored  CVS: regular rate and rhythm  Abdomen: soft, nontender, nondistended.  Ostomy pink and viable with some stool in bag  Extremities: no edema  Skin: warm, dry, appropriate color                          7.1    13.9  )-----------( 288      ( 19 Jul 2019 03:53 )             23.1   07-19    137  |  104  |  34<H>  ----------------------------<  159<H>  3.6   |  22  |  0.57    Ca    7.9<L>      19 Jul 2019 03:53  Phos  3.0     07-19  Mg     1.7     07-19

## 2019-07-21 LAB
GLUCOSE BLDC GLUCOMTR-MCNC: 121 MG/DL — HIGH (ref 70–99)
GLUCOSE BLDC GLUCOMTR-MCNC: 132 MG/DL — HIGH (ref 70–99)
GLUCOSE BLDC GLUCOMTR-MCNC: 153 MG/DL — HIGH (ref 70–99)
GLUCOSE BLDC GLUCOMTR-MCNC: 154 MG/DL — HIGH (ref 70–99)

## 2019-07-21 PROCEDURE — 99233 SBSQ HOSP IP/OBS HIGH 50: CPT

## 2019-07-21 PROCEDURE — 99232 SBSQ HOSP IP/OBS MODERATE 35: CPT

## 2019-07-21 RX ORDER — I.V. FAT EMULSION 20 G/100ML
20.8 EMULSION INTRAVENOUS
Qty: 50 | Refills: 0 | Status: DISCONTINUED | OUTPATIENT
Start: 2019-07-21 | End: 2019-07-22

## 2019-07-21 RX ORDER — ACETAMINOPHEN 500 MG
650 TABLET ORAL EVERY 6 HOURS
Refills: 0 | Status: DISCONTINUED | OUTPATIENT
Start: 2019-07-21 | End: 2019-08-02

## 2019-07-21 RX ORDER — ELECTROLYTE SOLUTION,INJ
1 VIAL (ML) INTRAVENOUS
Refills: 0 | Status: DISCONTINUED | OUTPATIENT
Start: 2019-07-21 | End: 2019-07-22

## 2019-07-21 RX ORDER — SODIUM CHLORIDE 0.65 %
1 AEROSOL, SPRAY (ML) NASAL DAILY
Refills: 0 | Status: DISCONTINUED | OUTPATIENT
Start: 2019-07-21 | End: 2019-08-02

## 2019-07-21 RX ADMIN — IMIPENEM AND CILASTATIN 100 MILLIGRAM(S): 250; 250 INJECTION, POWDER, FOR SOLUTION INTRAVENOUS at 00:48

## 2019-07-21 RX ADMIN — Medication 650 MILLIGRAM(S): at 10:33

## 2019-07-21 RX ADMIN — Medication 325 MILLIGRAM(S): at 11:09

## 2019-07-21 RX ADMIN — MONTELUKAST 10 MILLIGRAM(S): 4 TABLET, CHEWABLE ORAL at 11:09

## 2019-07-21 RX ADMIN — IMIPENEM AND CILASTATIN 100 MILLIGRAM(S): 250; 250 INJECTION, POWDER, FOR SOLUTION INTRAVENOUS at 06:07

## 2019-07-21 RX ADMIN — I.V. FAT EMULSION 20.8 ML/HR: 20 EMULSION INTRAVENOUS at 20:15

## 2019-07-21 RX ADMIN — Medication 650 MILLIGRAM(S): at 17:33

## 2019-07-21 RX ADMIN — ENOXAPARIN SODIUM 85 MILLIGRAM(S): 100 INJECTION SUBCUTANEOUS at 06:10

## 2019-07-21 RX ADMIN — CHLORHEXIDINE GLUCONATE 1 APPLICATION(S): 213 SOLUTION TOPICAL at 06:08

## 2019-07-21 RX ADMIN — Medication 1 EACH: at 20:15

## 2019-07-21 RX ADMIN — ERYTHROPOIETIN 20000 UNIT(S): 10000 INJECTION, SOLUTION INTRAVENOUS; SUBCUTANEOUS at 20:15

## 2019-07-21 RX ADMIN — Medication 650 MILLIGRAM(S): at 18:03

## 2019-07-21 RX ADMIN — Medication 3 MILLILITER(S): at 17:43

## 2019-07-21 RX ADMIN — PANTOPRAZOLE SODIUM 40 MILLIGRAM(S): 20 TABLET, DELAYED RELEASE ORAL at 06:08

## 2019-07-21 RX ADMIN — ENOXAPARIN SODIUM 85 MILLIGRAM(S): 100 INJECTION SUBCUTANEOUS at 17:07

## 2019-07-21 RX ADMIN — Medication 1 SPRAY(S): at 16:50

## 2019-07-21 RX ADMIN — Medication 650 MILLIGRAM(S): at 10:03

## 2019-07-21 RX ADMIN — Medication 0.5 MILLIGRAM(S): at 05:21

## 2019-07-21 RX ADMIN — SPIRONOLACTONE 25 MILLIGRAM(S): 25 TABLET, FILM COATED ORAL at 06:07

## 2019-07-21 RX ADMIN — Medication 500000 UNIT(S): at 17:06

## 2019-07-21 RX ADMIN — Medication 10 MILLIGRAM(S): at 17:07

## 2019-07-21 RX ADMIN — Medication 3 MILLILITER(S): at 11:21

## 2019-07-21 RX ADMIN — Medication 1000 MILLIGRAM(S): at 01:18

## 2019-07-21 RX ADMIN — PANTOPRAZOLE SODIUM 40 MILLIGRAM(S): 20 TABLET, DELAYED RELEASE ORAL at 17:07

## 2019-07-21 RX ADMIN — Medication 1 EACH: at 17:16

## 2019-07-21 RX ADMIN — Medication 3 MILLILITER(S): at 00:02

## 2019-07-21 RX ADMIN — IMIPENEM AND CILASTATIN 100 MILLIGRAM(S): 250; 250 INJECTION, POWDER, FOR SOLUTION INTRAVENOUS at 11:09

## 2019-07-21 RX ADMIN — SIMVASTATIN 20 MILLIGRAM(S): 20 TABLET, FILM COATED ORAL at 21:29

## 2019-07-21 RX ADMIN — I.V. FAT EMULSION 20.8 ML/HR: 20 EMULSION INTRAVENOUS at 17:16

## 2019-07-21 RX ADMIN — Medication 500000 UNIT(S): at 06:07

## 2019-07-21 RX ADMIN — Medication 0.5 MILLIGRAM(S): at 17:43

## 2019-07-21 RX ADMIN — Medication 2: at 07:11

## 2019-07-21 RX ADMIN — Medication 400 MILLIGRAM(S): at 00:48

## 2019-07-21 RX ADMIN — Medication 10 MILLIGRAM(S): at 06:08

## 2019-07-21 RX ADMIN — Medication 2: at 17:06

## 2019-07-21 RX ADMIN — Medication 3 MILLILITER(S): at 05:21

## 2019-07-21 RX ADMIN — Medication 40 MILLIEQUIVALENT(S): at 11:09

## 2019-07-21 RX ADMIN — IMIPENEM AND CILASTATIN 100 MILLIGRAM(S): 250; 250 INJECTION, POWDER, FOR SOLUTION INTRAVENOUS at 17:06

## 2019-07-21 RX ADMIN — Medication 40 MILLIGRAM(S): at 06:08

## 2019-07-21 NOTE — PROGRESS NOTE ADULT - ATTENDING COMMENTS
Patient seen and examined and agree with above.   Hemodynamically appropriate as well as respiratory state.   Patient with Asthma and will continue with singulair   Current management includes:  1) sinus tachycardia- will monitor and allow permissive tachycardia unless it is >125 bpm persistently  -denies chest pain or dyspnea    2) Hyperglycemia with DM type 2- continue insulin in TPN  -will start to wean off the TPN once able to tolerated a full diet  -monitor blood glucose with goal BG < 180    3) systolic heart failure - continue current medications  - appreciate HF team recommendations    4) Chronic atrial fibrillation- continue therapeautic lovenox    5) Essential hypertension- continue enalapril, furosemide and spironolactone    Leukocytosis improving. Patient overall is hemodynamically stable. WIll transfer to telemetry unit.

## 2019-07-21 NOTE — PROGRESS NOTE ADULT - ASSESSMENT
74F admitted with abdominal pain and exam findings consistent with peritonitis secondary to acute diverticulitis at the splenic flexure with evidence of contained perforation s/p Exploratory laparotomy, left hemicolectomy, end colostomy, and ABThera Vac placement (7/2) and s/p multiple RTOR for abdominal washout and abthera vac placement (7/3, 7/5, 7/9), closure with strattice mesh and wound vac placement (7/11) Returned to SICU on 7/16 due to respiratory distress.    PLAN:  NEURO: acute pain control  - Pain control with Dilaudid PRN and IV Tylenol    RESPIRATORY:  PMHx of Asthma; worsening SOB and CP on 7/14; ABG wnl on RA  - Continue Duonebs and Pulmicort Montelukast   - chest PT, IS  - Appreciate Pulm recs    CARDIOVASCULAR:  atrial fibrillation, HFrEF s/p AICD, non-sustained runs of V-tach   - Continue Enalapril for hypertension, Afterload reduction   - Trend daily weight   - Lasix 40mg daily, with Aldactone 25mg daily  - Per cards, no Metoprolol     GI/NUTRITION: s/p ex lap and L hemicolectomy with end colostomy and ABThera VAC placement and washout 7/2, s/p closure with strattice mesh and wound vac 7/11  - Continue Dysphagia I, CLD, Will advance as per primary team  If tolerates diet, will discontinue TPN   - Protonix for GERD  - having ostomy function    GENITOURINARY/RENAL: CKD stage 3  - Primafit to monitor I&Os  - Continue diuresis as needed  - 40mg lasix daily     HEMATOLOGIC:  * Patient is Druze & does NOT accept blood products*  - Appreciate Heme recs re anemia   - Started Procrit 20,000 SQ QD x5 days   - Minimize blood draws - use pediatric tubes  - 325mg Ferrous sulfate daily  - t- Lovenox     INFECTIOUS DISEASE: Enterococcus in abdominal fluid culture  - Complete course of imipenem/cilastatin 500mg IV Q6H on 7/20  - F/u WBC in AM CBC  - Repeat blood cultures if febrile    ENDOCRINE: DM   - ISS  - insulin in TPN     DISPO: Will transfer to floor. Full Code

## 2019-07-21 NOTE — PROGRESS NOTE ADULT - ASSESSMENT
A/P:74F admitted with abdominal pain and exam findings consistent with peritonitis secondary to acute diverticulitis at the splenic flexure with evidence of contained perforation now s/p Exploratory laparotomy, left hemicolectomy, end colostomy, and ABThera Vac placement and s/p from RTOR for abdominal washout and Abthera vac placement, off pressors. TPN started 7/5 for Protein-Calorie Malnutrition    - TPN formula @ goal: Carbohydrates: 210grams, Amino Acid: 120grams, 40g lipids in 2000 ml, may possibly decrease tomorrow if tolerating more of her Dysphagia I diet.  - Strict Intake and Output.,   - Weights three times a week  - Monitor BMP, Mg, Ionized Ca, Phosphorus daily  - Pre-albumin weekly.  - Hyperglycemia- will increase insulin from 55U to 60U today.  Continue sliding scale coverage.   - Hypokalemia - KCl @ 60 meq today.  - HyperNa-stabilized;  No NaAce or NaCl in TPN  - Anemia- pt is a Jehov[employer] Witness- continue mngmt per primary team, Hematology note appreciated  - Continue as per SICU/ Surgery d/w team, will follow with you    TPN team, pager [phone], spectra [phone]  D/w Adela Cross A/P:74F admitted with abdominal pain and exam findings consistent with peritonitis secondary to acute diverticulitis at the splenic flexure with evidence of contained perforation now s/p Exploratory laparotomy, left hemicolectomy, end colostomy, and ABThera Vac placement and s/p from RTOR for abdominal washout and Abthera vac placement, off pressors. TPN started 7/5 for Protein-Calorie Malnutrition    - cont TPN formula @ goal: Carbohydrates: 210grams, Amino Acid: 120grams, 40g lipids in 2000 ml, may possibly decrease tomorrow if tolerating more of her Dysphagia I diet.  - Strict Intake and Output.,   - Weights three times a week  - Monitor BMP, Mg, Ionized Ca, Phosphorus daily  - Pre-albumin weekly.  - Hyperglycemia- will increase insulin from 55U to 60U today.  Continue sliding scale coverage.   - Hypokalemia - KCl @ 60 meq today.  - HyperNa-stabilized;  No NaAce or NaCl in TPN  - Anemia- pt is a Jehovah Witness- continue mngmt per primary team, Hematology note appreciated  - requested SICu draw labs in AM to adjust TPN orders as needed.  - Continue as per SICU/ Surgery d/w team, will follow with you    TPN team, pager [phone], spectra [phone]  D/w Adela Cross

## 2019-07-21 NOTE — PROGRESS NOTE ADULT - SUBJECTIVE AND OBJECTIVE BOX
Health system NUTRITION SUPPORT--  Attending/ PA FOLLOW UP NOTE      24 hour events/subjective: Denies Palpitations, chest pain, shortness of breath. Denies nausea nor vomiting nor abdominal pain.        PAST HISTORY  --------------------------------------------------------------------------------  No significant changes to PMH, PSH, FHx, SHx, unless otherwise noted    ALLERGIES & MEDICATIONS  --------------------------------------------------------------------------------  Allergies    Coreg (Other)  digoxin (Other; Short breath (Mild to Mod))  penicillins (Hives)    Intolerances    metoprolol (Other)  Solu-Medrol (Other (Mild to Mod))    Standing Inpatient Medications  ALBUTerol/ipratropium for Nebulization 3 milliLiter(s) Nebulizer every 6 hours  buDESOnide    Inhalation Suspension 0.5 milliGRAM(s) Inhalation every 12 hours  chlorhexidine 2% Cloths 1 Application(s) Topical daily  enalapril 10 milliGRAM(s) Oral every 12 hours  enoxaparin Injectable 85 milliGRAM(s) SubCutaneous two times a day  epoetin dejuan Injectable 67194 Unit(s) SubCutaneous <User Schedule>  fat emulsion (Fish Oil and Plant Based) 20% Infusion 20.8 mL/Hr IV Continuous <Continuous>  ferrous    sulfate 325 milliGRAM(s) Oral daily  furosemide    Tablet 40 milliGRAM(s) Oral every 24 hours  imipenem/cilastatin  IVPB      imipenem/cilastatin  IVPB 500 milliGRAM(s) IV Intermittent every 6 hours  insulin lispro (HumaLOG) corrective regimen sliding scale   SubCutaneous three times a day before meals  insulin lispro (HumaLOG) corrective regimen sliding scale   SubCutaneous at bedtime  montelukast 10 milliGRAM(s) Oral every 24 hours  nystatin    Suspension 050374 Unit(s) Oral two times a day  pantoprazole    Tablet 40 milliGRAM(s) Oral two times a day  Parenteral Nutrition - Adult 1 Each TPN Continuous <Continuous>  potassium chloride    Tablet ER 40 milliEquivalent(s) Oral daily  simvastatin 20 milliGRAM(s) Oral at bedtime  spironolactone 25 milliGRAM(s) Oral daily    PRN Inpatient Medications      REVIEW OF SYSTEMS  --------------------------------------------------------------------------------  Gen: as per HPI  Skin: No rashes  Head/Eyes/Ears/Mouth: No headache;No sore throat  Respiratory: No dyspnea, cough,   CV: No chest pain, PND, orthopnea  GI: as per HPI  : No increased frequency, dysuria, hematuria, nocturia  MSK: No joint pain/swelling; no back pain; no edema  Neuro: No dizziness/lightheadedness, weakness, seizures, numbness, tingling  Psych: No significant nervousness, anxiety, stress, depression    All other systems were reviewed and are negative, except as noted.      LABS/STUDIES  --------------------------------------------------------------------------------                    Prealbumin, Serum: 5 mg/dL (07-04-19 @ 22:45)          07-20-19 @ 07:01  -  07-21-19 @ 07:00  --------------------------------------------------------  IN: 2662.4 mL / OUT: 1995 mL / NET: 667.4 mL    07-21-19 @ 07:01  -  07-21-19 @ 08:28  --------------------------------------------------------  IN: 83 mL / OUT: 0 mL / NET: 83 mL        VITALS/PHYSICAL EXAM  --------------------------------------------------------------------------------  T(C): 36.3 (07-21-19 @ 07:00), Max: 37.6 (07-20-19 @ 15:00)  HR: 107 (07-21-19 @ 07:00) (100 - 113)  BP: 114/56 (07-21-19 @ 07:00) (100/54 - 148/95)  RR: 25 (07-21-19 @ 07:00) (21 - 58)  SpO2: 99% (07-21-19 @ 07:00) (97% - 100%)  Wt(kg): --    Physical Exam:      	Gen: NAD,  sleepy but arousable  	HEENT: NC/AT, PERRL, mucosa moist, supple neck, clear oropharynx  	Chest: clear  	GI: +softly distended, nontender, no BS                  (+)Ostomy pink & viable. liquid & soft formed stool                  VAC intact w/ good seal, drains w/ serosanguinous drainage              :  (+)Ching              MSK/Vascular: Passive ROM x4,  no clubbing, cyanosis, nor edema                   RUE PICC dressing C/D/I w/o SOI  	Neuro: Awake and alert  	Skin: Warm, good turgor, without rashes [facility] NUTRITION SUPPORT--  Attending/ PA FOLLOW UP NOTE      24 hour events/subjective: Reports drinking this AM and eating Cream of Wheat bowl. Reports flatus and stool contents in Ostomy bag. . Denies nausea nor vomiting nor abdominal pain.        PAST HISTORY  --------------------------------------------------------------------------------  No significant changes to PMH, PSH, FHx, SHx, unless otherwise noted    ALLERGIES & MEDICATIONS  --------------------------------------------------------------------------------  Allergies    Coreg (Other)  digoxin (Other; Short breath (Mild to Mod))  penicillins (Hives)    Intolerances    metoprolol (Other)  Solu-Medrol (Other (Mild to Mod))    Standing Inpatient Medications  ALBUTerol/ipratropium for Nebulization 3 milliLiter(s) Nebulizer every 6 hours  buDESOnide    Inhalation Suspension 0.5 milliGRAM(s) Inhalation every 12 hours  chlorhexidine 2% Cloths 1 Application(s) Topical daily  enalapril 10 milliGRAM(s) Oral every 12 hours  enoxaparin Injectable 85 milliGRAM(s) SubCutaneous two times a day  epoetin dejuan Injectable 64358 Unit(s) SubCutaneous <User Schedule>  fat emulsion (Fish Oil and Plant Based) 20% Infusion 20.8 mL/Hr IV Continuous <Continuous>  ferrous    sulfate 325 milliGRAM(s) Oral daily  furosemide    Tablet 40 milliGRAM(s) Oral every 24 hours  imipenem/cilastatin  IVPB      imipenem/cilastatin  IVPB 500 milliGRAM(s) IV Intermittent every 6 hours  insulin lispro (HumaLOG) corrective regimen sliding scale   SubCutaneous three times a day before meals  insulin lispro (HumaLOG) corrective regimen sliding scale   SubCutaneous at bedtime  montelukast 10 milliGRAM(s) Oral every 24 hours  nystatin    Suspension 495433 Unit(s) Oral two times a day  pantoprazole    Tablet 40 milliGRAM(s) Oral two times a day  Parenteral Nutrition - Adult 1 Each TPN Continuous <Continuous>  potassium chloride    Tablet ER 40 milliEquivalent(s) Oral daily  simvastatin 20 milliGRAM(s) Oral at bedtime  spironolactone 25 milliGRAM(s) Oral daily    PRN Inpatient Medications      REVIEW OF SYSTEMS  --------------------------------------------------------------------------------  Gen: as per HPI  Skin: No rashes  Head/Eyes/Ears/Mouth: No headache;No sore throat  Respiratory: No dyspnea, cough,   CV: No chest pain, PND, orthopnea  GI: as per HPI  : No increased frequency, dysuria, hematuria, nocturia  MSK: No joint pain/swelling; no back pain; no edema  Neuro: No dizziness/lightheadedness, weakness, seizures, numbness, tingling  Psych: No significant nervousness, anxiety, stress, depression    All other systems were reviewed and are negative, except as noted.      LABS/STUDIES  --------------------------------------------------------------------------------    Prealbumin, Serum: 5 mg/dL (07-04-19 @ 22:45)          07-20-19 @ 07:01  -  07-21-19 @ 07:00  --------------------------------------------------------  IN: 2662.4 mL / OUT: 1995 mL / NET: 667.4 mL    07-21-19 @ 07:01 - 07-21-19 @ 08:28  --------------------------------------------------------  IN: 83 mL / OUT: 0 mL / NET: 83 mL        VITALS/PHYSICAL EXAM  --------------------------------------------------------------------------------  T(C): 36.3 (07-21-19 @ 07:00), Max: 37.6 (07-20-19 @ 15:00)  HR: 107 (07-21-19 @ 07:00) (100 - 113)  BP: 114/56 (07-21-19 @ 07:00) (100/54 - 148/95)  RR: 25 (07-21-19 @ 07:00) (21 - 58)  SpO2: 99% (07-21-19 @ 07:00) (97% - 100%)  Wt(kg): --    Physical Exam:      	Gen: NAD,  sleepy but arousable  	HEENT: NC/AT, PERRL, mucosa moist, supple neck, clear oropharynx  	Chest: clear  	GI: +softly distended, nontender, no BS                  (+)Ostomy pink & viable. liquid & soft formed stool                  VAC intact w/ good seal, drains w/ serosanguinous drainage              :  (+)Ching              MSK/Vascular: Passive ROM x4,  no clubbing, cyanosis, nor edema                   RUE PICC dressing C/D/I w/o SOI  	Neuro: Awake and alert  	Skin: Warm, good turgor, without rashes

## 2019-07-21 NOTE — PROGRESS NOTE ADULT - SUBJECTIVE AND OBJECTIVE BOX
Interval Events:    S: Patient doing well, denies fevers, chills, nausea, emesis, chest pain, SOB.  pain well controlled.  Having ostomy function.  Tolerating clears.    O: Vital Signs  T(C): 37.3 (07-21 @ 03:00), Max: 37.6 (07-20 @ 15:00)  HR: 100 (07-21 @ 05:23) (100 - 115)  BP: 114/58 (07-21 @ 04:00) (100/54 - 144/65)  RR: 37 (07-21 @ 04:00) (21 - 58)  SpO2: 100% (07-21 @ 05:23) (97% - 100%)  07-19-19 @ 07:01  -  07-20-19 @ 07:00  --------------------------------------------------------  IN: 3119.4 mL / OUT: 1890 mL / NET: 1229.4 mL    07-20-19 @ 07:01  -  07-21-19 @ 06:45  --------------------------------------------------------  IN: 2579.4 mL / OUT: 1445 mL / NET: 1134.4 mL    General: alert and oriented, NAD  Resp: airway patent, respirations unlabored  CVS: regular rate and rhythm  Abdomen: soft, nontender, nondistended.  Ostomy pink and viable with some stool in bag  Extremities: no edema  Skin: warm, dry, appropriate color

## 2019-07-21 NOTE — PROGRESS NOTE ADULT - SUBJECTIVE AND OBJECTIVE BOX
HISTORY  74y Female with history of HFrEF s/p AICD, asthma, HTN, HLD, atrial fibrillation on Eliquis presented with severe, sudden onset L sided abd pain which started on the morning of 6/30/19 after eating. She endorsed nausea at the time but no vomiting. She did not have any fevers, chills, diarrhea or hematochezia.  CTAP w/ IV contrast showed proximal descending colon/splenic flexure diverticulitis with small amount of free fluid and 3cm low attenuation structure in the right adnexa. She developed dyspnea and wheezing after the CT scan and became hypoxic. Bedside US int he ED demonstrated a few B lines. She received 80mg total of IV L.asix, 125 of solumedrol, Duonebs x2 and Narcan and she was admitted to MICU for management of respiratory and heart failure.     Through the next day (7/1), she was persistently tachycardic with BPs as low as 76/50, MAPs in low 60s. She was bolused with 500cc of crystalloid without improvement in blood pressure. She was then started on phenylephrine. Her lactate was uptrending from 2 to 2.6 to 3 on the arterial blood gas. A repeat CT scan showed acute diverticulitis at the splenic flexure with evidence of contained perforation, but no drainable collection. 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 Pt was left open with Abthera vac. She was taken back to the OR multiple times 7/3, 7/5, and 7/9) for washout of purulent ascites, but was unable to be closed secondary to edematous bowel. She was diuresed and then on 7/11 she was taken to OR and closed with strattice mesh. 2 Wolfgang drains left in place, and wound vac placed over mesh. She returned to SICU intubated on 7/11.    On 7/16, patient developed progressive shortness of breath and reported chest pain. Concern for PE given with hx of A-fib and being off AC for the past 2 weeks. She was started on Lovenox on 7/15. She returned to the SICU due to respiratory distress.     24 HOUR EVENTS:  - Advanced diet to regular honey consistency   - Has GI fxn     SUBJECTIVE/ROS:  [ ] A ten-point review of systems was otherwise negative except as noted.  [ ] Due to altered mental status/intubation, subjective information were not able to be obtained from the patient. History was obtained, to the extent possible, from review of the chart and collateral sources of information.      NEURO  RASS:     GCS:     CAM ICU:  Exam: awake, alert, oriented  Meds:   [x] Adequacy of sedation and pain control has been assessed and adjusted      RESPIRATORY  RR: 36 (07-20-19 @ 23:00) (21 - 45)  SpO2: 100% (07-21-19 @ 00:03) (94% - 100%)  Wt(kg): --  Exam: unlabored, clear to auscultation bilaterally  Mechanical Ventilation:     [N/A] Extubation Readiness Assessed  Meds: ALBUTerol/ipratropium for Nebulization 3 milliLiter(s) Nebulizer every 6 hours  buDESOnide    Inhalation Suspension 0.5 milliGRAM(s) Inhalation every 12 hours  montelukast 10 milliGRAM(s) Oral every 24 hours        CARDIOVASCULAR  HR: 110 (07-21-19 @ 00:03) (106 - 128)  BP: 121/67 (07-20-19 @ 23:00) (100/54 - 144/65)  BP(mean): 87 (07-20-19 @ 23:00) (68 - 95)    Exam: regular rate and rhythm  Cardiac Rhythm: sinus  Perfusion     [x]Adequate   [ ]Inadequate  Mentation   [x]Normal       [ ]Reduced  Extremities  [x]Warm         [ ]Cool  Volume Status [ ]Hypervolemic [x]Euvolemic [ ]Hypovolemic  Meds: enalapril 10 milliGRAM(s) Oral every 12 hours  furosemide    Tablet 40 milliGRAM(s) Oral every 24 hours  spironolactone 25 milliGRAM(s) Oral daily        GI/NUTRITION  Exam: soft, nontender, nondistended, incision C/D/I  Diet: dysphagia   Meds: pantoprazole    Tablet 40 milliGRAM(s) Oral two times a day      GENITOURINARY  I&O's Detail    07-19 @ 07:01  -  07-20 @ 07:00  --------------------------------------------------------  IN:    fat emulsion (Fish Oil and Plant Based) 20% Infusion: 270.4 mL    Oral Fluid: 240 mL    Solution: 100 mL    Solution: 200 mL    Solution: 400 mL    TPN (Total Parenteral Nutrition): 1909 mL  Total IN: 3119.4 mL    OUT:    Colostomy: 60 mL    Drain: 20 mL    Drain: 10 mL    Voided: 1800 mL  Total OUT: 1890 mL    Total NET: 1229.4 mL      07-20 @ 07:01  -  07-21 @ 03:14  --------------------------------------------------------  IN:    fat emulsion (Fish Oil and Plant Based) 20% Infusion: 124.8 mL    Solution: 200 mL    TPN (Total Parenteral Nutrition): 1328 mL  Total IN: 1652.8 mL    OUT:    Colostomy: 25 mL    Drain: 15 mL    Drain: 5 mL    VAC (Vacuum Assisted Closure) System: 200 mL    Voided: 1150 mL  Total OUT: 1395 mL    Total NET: 257.8 mL          07-19    137  |  104  |  34<H>  ----------------------------<  159<H>  3.6   |  22  |  0.57    Ca    7.9<L>      19 Jul 2019 03:53  Phos  3.0     07-19  Mg     1.7     07-19      [ ] Ching catheter, indication: N/A  Meds: fat emulsion (Fish Oil and Plant Based) 20% Infusion 20.8 mL/Hr IV Continuous <Continuous>  ferrous    sulfate 325 milliGRAM(s) Oral daily  Parenteral Nutrition - Adult 1 Each TPN Continuous <Continuous>  potassium chloride    Tablet ER 40 milliEquivalent(s) Oral daily        HEMATOLOGIC  Meds: enoxaparin Injectable 85 milliGRAM(s) SubCutaneous two times a day    [x] VTE Prophylaxis                        7.1    13.9  )-----------( 288      ( 19 Jul 2019 03:53 )             23.1       Transfusion     [ ] PRBC   [ ] Platelets   [ ] FFP   [ ] Cryoprecipitate      INFECTIOUS DISEASES    RECENT CULTURES:    Meds: epoetin dejuan Injectable 83692 Unit(s) SubCutaneous <User Schedule>  imipenem/cilastatin  IVPB      imipenem/cilastatin  IVPB 500 milliGRAM(s) IV Intermittent every 6 hours  nystatin    Suspension 119783 Unit(s) Oral two times a day        ENDOCRINE  CAPILLARY BLOOD GLUCOSE      POCT Blood Glucose.: 140 mg/dL (20 Jul 2019 22:10)  POCT Blood Glucose.: 147 mg/dL (20 Jul 2019 17:13)  POCT Blood Glucose.: 137 mg/dL (20 Jul 2019 11:05)  POCT Blood Glucose.: 153 mg/dL (20 Jul 2019 05:42)    Meds: insulin lispro (HumaLOG) corrective regimen sliding scale   SubCutaneous three times a day before meals  insulin lispro (HumaLOG) corrective regimen sliding scale   SubCutaneous at bedtime  simvastatin 20 milliGRAM(s) Oral at bedtime        ACCESS DEVICES:  [ ] Peripheral IV  [ ] Central Venous Line	[ ] R	[ ] L	[ ] IJ	[ ] Fem	[ ] SC	Placed:   [ ] Arterial Line		[ ] R	[ ] L	[ ] Fem	[ ] Rad	[ ] Ax	Placed:   [ ] PICC:					[ ] Mediport  [ ] Urinary Catheter, Date Placed:   [x] Necessity of urinary, arterial, and venous catheters discussed    OTHER MEDICATIONS:  chlorhexidine 2% Cloths 1 Application(s) Topical daily      CODE STATUS:      IMAGING:

## 2019-07-21 NOTE — PROGRESS NOTE ADULT - ASSESSMENT
Assessment:    ANDRE BARKER is a 74y Female with PMH of asthma, A fib, HFrEF on POD 15 from L hemicolectomy and end colostomy with VAC placement along with multiple washouts and closure with bridging mesh. Clinical condition improving.  Tolerated some clears.  Not ready to advance diet just yet.  Possible transfer to the floor over the weekend, pending clinical course.     Plan:    Abx: impenem/cilastin  Diet: Clears  continue procrit  Continue aggressive chest PT    Plastics to perform graft for definitive closure of abd wound with granulation of tissue in 1-3 weeks

## 2019-07-21 NOTE — PROGRESS NOTE ADULT - ATTENDING COMMENTS
seen and examined 07-21-19 @ 0750    tolerating minimal Thick & Easy  no nausea or vomiting    soft / NT / ND  colostomy bag with air and stool  bilateral LE edema to ankles      sepsis from perforated diverticulitis with intraabdominal hypertension  7/2 - s/p Lupe's / ABThera  7/3 - s/p washout / ABThera  7/5 - washout / ABThera    moderate protein calorie malnutrition  -continue TPN at goal until tolerating adequate oral nutrition    type 2 DM  -continue insulin 60 units    fluid overload  -continue NaCl 0 mEq and NaAc 0 mEq

## 2019-07-22 DIAGNOSIS — K57.32 DIVERTICULITIS OF LARGE INTESTINE WITHOUT PERFORATION OR ABSCESS WITHOUT BLEEDING: ICD-10-CM

## 2019-07-22 LAB
ANION GAP SERPL CALC-SCNC: 10 MMOL/L — SIGNIFICANT CHANGE UP (ref 5–17)
BUN SERPL-MCNC: 27 MG/DL — HIGH (ref 7–23)
CALCIUM SERPL-MCNC: 8 MG/DL — LOW (ref 8.4–10.5)
CHLORIDE SERPL-SCNC: 102 MMOL/L — SIGNIFICANT CHANGE UP (ref 96–108)
CO2 SERPL-SCNC: 20 MMOL/L — LOW (ref 22–31)
CREAT SERPL-MCNC: 0.5 MG/DL — SIGNIFICANT CHANGE UP (ref 0.5–1.3)
GLUCOSE BLDC GLUCOMTR-MCNC: 152 MG/DL — HIGH (ref 70–99)
GLUCOSE BLDC GLUCOMTR-MCNC: 171 MG/DL — HIGH (ref 70–99)
GLUCOSE BLDC GLUCOMTR-MCNC: 173 MG/DL — HIGH (ref 70–99)
GLUCOSE BLDC GLUCOMTR-MCNC: 174 MG/DL — HIGH (ref 70–99)
GLUCOSE SERPL-MCNC: 142 MG/DL — HIGH (ref 70–99)
MAGNESIUM SERPL-MCNC: 1.6 MG/DL — SIGNIFICANT CHANGE UP (ref 1.6–2.6)
PHOSPHATE SERPL-MCNC: 2.5 MG/DL — SIGNIFICANT CHANGE UP (ref 2.5–4.5)
POTASSIUM SERPL-MCNC: 4 MMOL/L — SIGNIFICANT CHANGE UP (ref 3.5–5.3)
POTASSIUM SERPL-SCNC: 4 MMOL/L — SIGNIFICANT CHANGE UP (ref 3.5–5.3)
SODIUM SERPL-SCNC: 132 MMOL/L — LOW (ref 135–145)

## 2019-07-22 PROCEDURE — 99232 SBSQ HOSP IP/OBS MODERATE 35: CPT

## 2019-07-22 PROCEDURE — 99233 SBSQ HOSP IP/OBS HIGH 50: CPT | Mod: GC

## 2019-07-22 PROCEDURE — 99233 SBSQ HOSP IP/OBS HIGH 50: CPT

## 2019-07-22 RX ORDER — APIXABAN 2.5 MG/1
5 TABLET, FILM COATED ORAL EVERY 12 HOURS
Refills: 0 | Status: DISCONTINUED | OUTPATIENT
Start: 2019-07-22 | End: 2019-08-02

## 2019-07-22 RX ORDER — MAGNESIUM SULFATE 500 MG/ML
2 VIAL (ML) INJECTION
Refills: 0 | Status: COMPLETED | OUTPATIENT
Start: 2019-07-22 | End: 2019-07-22

## 2019-07-22 RX ORDER — ELECTROLYTE SOLUTION,INJ
1 VIAL (ML) INTRAVENOUS
Refills: 0 | Status: DISCONTINUED | OUTPATIENT
Start: 2019-07-22 | End: 2019-07-22

## 2019-07-22 RX ORDER — I.V. FAT EMULSION 20 G/100ML
20.8 EMULSION INTRAVENOUS
Qty: 50 | Refills: 0 | Status: DISCONTINUED | OUTPATIENT
Start: 2019-07-22 | End: 2019-07-23

## 2019-07-22 RX ADMIN — Medication 40 MILLIGRAM(S): at 05:03

## 2019-07-22 RX ADMIN — Medication 3 MILLILITER(S): at 05:33

## 2019-07-22 RX ADMIN — I.V. FAT EMULSION 20.8 ML/HR: 20 EMULSION INTRAVENOUS at 17:20

## 2019-07-22 RX ADMIN — APIXABAN 5 MILLIGRAM(S): 2.5 TABLET, FILM COATED ORAL at 17:09

## 2019-07-22 RX ADMIN — SIMVASTATIN 20 MILLIGRAM(S): 20 TABLET, FILM COATED ORAL at 21:37

## 2019-07-22 RX ADMIN — Medication 125 MILLIMOLE(S): at 06:28

## 2019-07-22 RX ADMIN — Medication 500000 UNIT(S): at 05:03

## 2019-07-22 RX ADMIN — Medication 10 MILLIGRAM(S): at 17:09

## 2019-07-22 RX ADMIN — Medication 50 GRAM(S): at 05:04

## 2019-07-22 RX ADMIN — MONTELUKAST 10 MILLIGRAM(S): 4 TABLET, CHEWABLE ORAL at 11:30

## 2019-07-22 RX ADMIN — PANTOPRAZOLE SODIUM 40 MILLIGRAM(S): 20 TABLET, DELAYED RELEASE ORAL at 05:03

## 2019-07-22 RX ADMIN — Medication 3 MILLILITER(S): at 17:09

## 2019-07-22 RX ADMIN — SPIRONOLACTONE 25 MILLIGRAM(S): 25 TABLET, FILM COATED ORAL at 05:03

## 2019-07-22 RX ADMIN — Medication 3 MILLILITER(S): at 11:30

## 2019-07-22 RX ADMIN — ENOXAPARIN SODIUM 85 MILLIGRAM(S): 100 INJECTION SUBCUTANEOUS at 05:03

## 2019-07-22 RX ADMIN — Medication 1 SPRAY(S): at 21:37

## 2019-07-22 RX ADMIN — Medication 650 MILLIGRAM(S): at 12:36

## 2019-07-22 RX ADMIN — Medication 1 EACH: at 08:02

## 2019-07-22 RX ADMIN — Medication 0.5 MILLIGRAM(S): at 05:33

## 2019-07-22 RX ADMIN — Medication 0.5 MILLIGRAM(S): at 17:09

## 2019-07-22 RX ADMIN — Medication 3 MILLILITER(S): at 00:49

## 2019-07-22 RX ADMIN — CHLORHEXIDINE GLUCONATE 1 APPLICATION(S): 213 SOLUTION TOPICAL at 05:04

## 2019-07-22 RX ADMIN — Medication 2: at 08:02

## 2019-07-22 RX ADMIN — Medication 10 MILLIGRAM(S): at 05:03

## 2019-07-22 RX ADMIN — Medication 500000 UNIT(S): at 17:09

## 2019-07-22 RX ADMIN — PANTOPRAZOLE SODIUM 40 MILLIGRAM(S): 20 TABLET, DELAYED RELEASE ORAL at 17:09

## 2019-07-22 RX ADMIN — Medication 1 EACH: at 17:20

## 2019-07-22 RX ADMIN — Medication 50 GRAM(S): at 03:48

## 2019-07-22 RX ADMIN — Medication 2: at 11:32

## 2019-07-22 RX ADMIN — Medication 325 MILLIGRAM(S): at 11:30

## 2019-07-22 RX ADMIN — Medication 2: at 20:33

## 2019-07-22 NOTE — PROGRESS NOTE ADULT - ASSESSMENT
Assessment:    ANDRE BARKER is a 74y Female with PMH of asthma, A fib, HFrEF on POD 16 from L hemicolectomy and end colostomy with VAC placement along with multiple washouts and closure with bridging mesh. Clinical condition improving.  Tolerated some clears.  Not ready to advance diet just yet.  SICU planning on transfer to the floor soon.    Plan:    Abx: impenem/cilastin  Diet: Clears  continue procrit  Continue aggressive chest PT  Question of restarting DOAC on floor    Plastics to perform graft for definitive closure of abd wound with granulation of tissue in 1-3 weeks Assessment:    ANDRE BARKER is a 74y Female with PMH of asthma, A fib, HFrEF on POD 16 from L hemicolectomy and end colostomy with VAC placement along with multiple washouts and closure with bridging mesh. Clinical condition improving.  Tolerated some clears.  Not ready to advance diet just yet.  SICU planning on transfer to the floor soon.    Plan:    Abx: impenem/cilastin  Diet: Clears  continue procrit  Continue aggressive chest PT  Okay to restart eliquis  Low output from other abd drain, will pull today.    Plastics to perform graft for definitive closure of abd wound with granulation of tissue in 1-3 weeks

## 2019-07-22 NOTE — PROGRESS NOTE ADULT - ASSESSMENT
A/P:74F admitted with abdominal pain and exam findings consistent with peritonitis secondary to acute diverticulitis at the splenic flexure with evidence of contained perforation now s/p Exploratory laparotomy, left hemicolectomy, end colostomy, and ABThera Vac placement and s/p from RTOR for abdominal washout and Abthera vac placement, off pressors. TPN started 7/5 for Protein-Calorie Malnutrition    - TPN formula @ goal: Carbohydrates: 210grams, Amino Acid: 120grams, 40g lipids decreased from 2000 to 1200 ml      will taper TPN as pt tolerating more of a diet- Dysphagia I DM  - Strict Intake and Output.,   - Weights three times a week  - Monitor BMP, Mg, Ionized Ca, Phosphorus daily  - Pre-albumin weekly.  - Hyperglycemia- will increase insulin from 55U to 60U today.  Continue sliding scale coverage.   - Hyperkalemia - KCl decreased from 100 meq to 60 meq today.  - HyperNa-stabilized;  No NaAce or NaCl in TPN  - Anemia- pt is a Jehovah Witness- continue mngt per primary team, Hematology note appreciated  - Continue as per SICU/ Surgery d/w team, will follow with you    TPN team, pager [phone], spectra [phone]  D/w Adela Cross

## 2019-07-22 NOTE — PROGRESS NOTE ADULT - ASSESSMENT
74F admitted with abdominal pain and exam findings consistent with peritonitis secondary to acute diverticulitis at the splenic flexure with evidence of contained perforation s/p Exploratory laparotomy, left hemicolectomy, end colostomy, and ABThera Vac placement (7/2) and s/p multiple RTOR for abdominal washout and abthera vac placement (7/3, 7/5, 7/9), closure with strattice mesh and wound vac placement (7/11) Returned to SICU on 7/16 due to respiratory distress.    PLAN:  NEURO: acute pain control  - Pain control with tylenol and oxycodone prn    RESPIRATORY:  PMHx of Asthma; worsening SOB and CP on 7/14; ABG wnl on RA  - Continue Duonebs and Pulmicort Montelukast   - chest PT, IS  - Appreciate Pulm recs    CARDIOVASCULAR:  atrial fibrillation, HFrEF s/p AICD, non-sustained runs of V-tach   - Continue Enalapril for hypertension  - Trend daily weight   - Lasix 40mg daily, with Aldactone 25mg daily  - Per cards, no Metoprolol     GI/NUTRITION: s/p ex lap and L hemicolectomy with end colostomy and ABThera VAC placement and washout 7/2, s/p closure with strattice mesh and wound vac 7/11  - Continue Dysphagia I, CLD, Will advance as per primary team  - will wean TPN   - Protonix for GERD  - having ostomy function    GENITOURINARY/RENAL: CKD stage 3  - Primafit to monitor I&Os  - Continue diuresis as needed  - 40mg lasix daily     HEMATOLOGIC:  * Patient is [employer] & does NOT accept blood products*  - Appreciate Heme recs re anemia   - s/p Procrit 20,000 SQ QD x5 days   - Minimize blood draws - use pediatric tubes  - 325mg Ferrous sulfate daily  - t- Lovenox     INFECTIOUS DISEASE: Enterococcus in abdominal fluid culture  - Complete course of imipenem/cilastatin 500mg IV Q6H on 7/20   - F/u WBC in AM CBC  - Repeat blood cultures if febrile    ENDOCRINE: DM   - ISS  - insulin in TPN     DISPO: [facility] floor    SICU [id number]

## 2019-07-22 NOTE — PROGRESS NOTE ADULT - SUBJECTIVE AND OBJECTIVE BOX
HISTORY  74y Female with history of HFrEF s/p AICD, asthma, HTN, HLD, atrial fibrillation on Eliquis presented with severe, sudden onset L sided abd pain which started on the morning of 6/30/19 after eating. She endorsed nausea at the time but no vomiting. She did not have any fevers, chills, diarrhea or hematochezia.  CTAP w/ IV contrast showed proximal descending colon/splenic flexure diverticulitis with small amount of free fluid and 3cm low attenuation structure in the right adnexa. She developed dyspnea and wheezing after the CT scan and became hypoxic. Bedside US int he ED demonstrated a few B lines. She received 80mg total of IV L.asix, 125 of solumedrol, Duonebs x2 and Narcan and she was admitted to MICU for management of respiratory and heart failure.     Through the next day (7/1), she was persistently tachycardic with BPs as low as 76/50, MAPs in low 60s. She was bolused with 500cc of crystalloid without improvement in blood pressure. She was then started on phenylephrine. Her lactate was uptrending from 2 to 2.6 to 3 on the arterial blood gas. A repeat CT scan showed acute diverticulitis at the splenic flexure with evidence of contained perforation, but no drainable collection. 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 Pt was left open with Abthera vac. She was taken back to the OR multiple times 7/3, 7/5, and 7/9) for washout of purulent ascites, but was unable to be closed secondary to edematous bowel. She was diuresed and then on 7/11 she was taken to OR and closed with strattice mesh. 2 Wolfgang drains left in place, and wound vac placed over mesh. She returned to SICU intubated on 7/11.    On 7/16, patient developed progressive shortness of breath and reported chest pain. Concern for PE given with hx of A-fib and being off AC for the past 2 weeks. She was started on Lovenox on 7/15. She returned to the SICU due to respiratory distress.     24 HOUR EVENTS:  -Wound care consult ordered  -Tolerating dysphagia diet  -No acute events overnight  -Listed for tele floor    SUBJECTIVE/ROS:  [ ] A ten-point review of systems was otherwise negative except as noted.  [ ] Due to altered mental status/intubation, subjective information were not able to be obtained from the patient. History was obtained, to the extent possible, from review of the chart and collateral sources of information.      NEURO  Exam: awake, alert, oriented  Meds: acetaminophen   Tablet .. 650 milliGRAM(s) Oral every 6 hours PRN Mild Pain (1 - 3)    [x] Adequacy of sedation and pain control has been assessed and adjusted      RESPIRATORY  RR: 32 (07-22-19 @ 00:00) (17 - 58)  SpO2: 100% (07-22-19 @ 00:51) (97% - 100%)  Wt(kg): --  Exam: unlabored, clear to auscultation bilaterally  Mechanical Ventilation:     [N/A] Extubation Readiness Assessed  Meds: ALBUTerol/ipratropium for Nebulization 3 milliLiter(s) Nebulizer every 6 hours  buDESOnide    Inhalation Suspension 0.5 milliGRAM(s) Inhalation every 12 hours  montelukast 10 milliGRAM(s) Oral every 24 hours        CARDIOVASCULAR  HR: 109 (07-22-19 @ 00:51) (100 - 120)  BP: 134/60 (07-22-19 @ 00:00) (112/53 - 148/95)  BP(mean): 87 (07-22-19 @ 00:00) (76 - 107)  ABP: --  ABP(mean): --  Wt(kg): --  CVP(cm H2O): --      Exam: regular rate and rhythm  Cardiac Rhythm: sinus  Perfusion     [x]Adequate   [ ]Inadequate  Mentation   [x]Normal       [ ]Reduced  Extremities  [x]Warm         [ ]Cool  Volume Status [ ]Hypervolemic [x]Euvolemic [ ]Hypovolemic  Meds: enalapril 10 milliGRAM(s) Oral every 12 hours  furosemide    Tablet 40 milliGRAM(s) Oral every 24 hours  spironolactone 25 milliGRAM(s) Oral daily        GI/NUTRITION  Exam: soft, nontender, nondistended, vac site holding suction, ostomy w/ stool and air  Diet: dysphagia 1 pureed-honey consistency fluid  Meds: pantoprazole    Tablet 40 milliGRAM(s) Oral two times a day      GENITOURINARY  I&O's Detail    07-20 @ 07:01 - 07-21 @ 07:00  --------------------------------------------------------  IN:    fat emulsion (Fish Oil and Plant Based) 20% Infusion: 270.4 mL    Solution: 100 mL    Solution: 300 mL    TPN (Total Parenteral Nutrition): 1992 mL  Total IN: 2662.4 mL    OUT:    Colostomy: 75 mL    Drain: 15 mL    Drain: 5 mL    VAC (Vacuum Assisted Closure) System: 200 mL    Voided: 1700 mL  Total OUT: 1995 mL    Total NET: 667.4 mL      07-21 @ 07:01 - 07-22 @ 01:07  --------------------------------------------------------  IN:    fat emulsion (Fish Oil and Plant Based) 20% Infusion: 124.8 mL    Oral Fluid: 100 mL    Solution: 200 mL    TPN (Total Parenteral Nutrition): 1328 mL  Total IN: 1752.8 mL    OUT:    Colostomy: 200 mL    Drain: 5 mL    Voided: 1250 mL  Total OUT: 1455 mL    Total NET: 297.8 mL                [ ] Ching catheter, indication: N/A  Meds: fat emulsion (Fish Oil and Plant Based) 20% Infusion 20.8 mL/Hr IV Continuous <Continuous>  ferrous    sulfate 325 milliGRAM(s) Oral daily  Parenteral Nutrition - Adult 1 Each TPN Continuous <Continuous>        HEMATOLOGIC  Meds: enoxaparin Injectable 85 milliGRAM(s) SubCutaneous two times a day    [x] VTE Prophylaxis      Transfusion     [ ] PRBC   [ ] Platelets   [ ] FFP   [ ] Cryoprecipitate      INFECTIOUS DISEASES    RECENT CULTURES:    Meds: nystatin    Suspension 127273 Unit(s) Oral two times a day        ENDOCRINE  CAPILLARY BLOOD GLUCOSE      POCT Blood Glucose.: 132 mg/dL (21 Jul 2019 21:28)  POCT Blood Glucose.: 153 mg/dL (21 Jul 2019 16:47)  POCT Blood Glucose.: 121 mg/dL (21 Jul 2019 11:07)  POCT Blood Glucose.: 154 mg/dL (21 Jul 2019 07:08)    Meds: insulin lispro (HumaLOG) corrective regimen sliding scale   SubCutaneous three times a day before meals  insulin lispro (HumaLOG) corrective regimen sliding scale   SubCutaneous at bedtime  simvastatin 20 milliGRAM(s) Oral at bedtime        ACCESS DEVICES:  [ ] Peripheral IV  [ ] Central Venous Line	[ ] R	[ ] L	[ ] IJ	[ ] Fem	[ ] SC	Placed:   [ ] Arterial Line		[ ] R	[ ] L	[ ] Fem	[ ] Rad	[ ] Ax	Placed:   [x] PICC:					[ ] Mediport  [ ] Urinary Catheter, Date Placed:   [x] Necessity of urinary, arterial, and venous catheters discussed    OTHER MEDICATIONS:  chlorhexidine 2% Cloths 1 Application(s) Topical daily  sodium chloride 0.65% Nasal 1 Spray(s) Both Nostrils daily PRN      CODE STATUS: Full code      IMAGING:

## 2019-07-22 NOTE — PROGRESS NOTE ADULT - SUBJECTIVE AND OBJECTIVE BOX
Follow Up:      Interval History:    REVIEW OF SYSTEMS  [  ] ROS unobtainable because:    [  ] All other systems negative except as noted below    Constitutional:  [ ] fever [ ] chills  [ ] weight loss  [ ] weakness  Skin:  [ ] rash [ ] phlebitis	  Eyes: [ ] icterus [ ] pain  [ ] discharge	  ENMT: [ ] sore throat  [ ] thrush [ ] ulcers [ ] exudates  Respiratory: [ ] dyspnea [ ] hemoptysis [ ] cough [ ] sputum	  Cardiovascular:  [ ] chest pain [ ] palpitations [ ] edema	  Gastrointestinal:  [ ] nausea [ ] vomiting [ ] diarrhea [ ] constipation [ ] pain	  Genitourinary:  [ ] dysuria [ ] frequency [ ] hematuria [ ] discharge [ ] flank pain  [ ] incontinence  Musculoskeletal:  [ ] myalgias [ ] arthralgias [ ] arthritis  [ ] back pain  Neurological:  [ ] headache [ ] seizures  [ ] confusion/altered mental status    Allergies  Coreg (Other)  digoxin (Other; Short breath (Mild to Mod))  penicillins (Hives)        ANTIMICROBIALS:  nystatin    Suspension 352207 two times a day      OTHER MEDS:  MEDICATIONS  (STANDING):  acetaminophen   Tablet .. 650 every 6 hours PRN  ALBUTerol/ipratropium for Nebulization 3 every 6 hours  apixaban 5 every 12 hours  buDESOnide    Inhalation Suspension 0.5 every 12 hours  enalapril 10 every 12 hours  furosemide    Tablet 40 every 24 hours  insulin lispro (HumaLOG) corrective regimen sliding scale  three times a day before meals  insulin lispro (HumaLOG) corrective regimen sliding scale  at bedtime  montelukast 10 every 24 hours  pantoprazole    Tablet 40 two times a day  simvastatin 20 at bedtime  spironolactone 25 daily      Vital Signs Last 24 Hrs  T(C): 36.9 (22 Jul 2019 17:08), Max: 37.3 (22 Jul 2019 11:21)  T(F): 98.5 (22 Jul 2019 17:08), Max: 99.1 (22 Jul 2019 11:21)  HR: 108 (22 Jul 2019 17:08) (98 - 117)  BP: 118/65 (22 Jul 2019 17:08) (117/59 - 143/65)  BP(mean): 83 (22 Jul 2019 10:00) (81 - 93)  RR: 18 (22 Jul 2019 17:08) (18 - 36)  SpO2: 98% (22 Jul 2019 17:08) (95% - 100%)    PHYSICAL EXAM:  General: non-toxic  HEAD/EYES: anicteric, PERRL  ENT:  supple  Cardiovascular:   S1, S2  Respiratory:  clear bilaterally  GI:  soft, non-tender, normal bowel sounds  :  no CVA tenderness   Musculoskeletal:  no synovitis  Neurologic:  grossly non-focal  Skin:  no rash  Lymph: no lymphadenopathy  Psychiatric:  appropriate affect  Vascular:  no phlebitis              07-22    132<L>  |  102  |  27<H>  ----------------------------<  142<H>  4.0   |  20<L>  |  0.50    Ca    8.0<L>      22 Jul 2019 01:53  Phos  2.5     07-22  Mg     1.6     07-22            MICROBIOLOGY:  v  .Blood  07-10-19   No growth at 5 days.  --  --      .Blood  07-10-19   No growth at 5 days.  --  --      .Body Fluid abdominal fluid  07-09-19   Few Enterococcus faecalis  Few Coag Negative Staphylococcus  --  Enterococcus faecalis  Coag Negative Staphylococcus      .Body Fluid abdominal fluid  07-09-19   Few Enterococcus faecalis  Few Coag Negative Staphylococcus  --  Enterococcus faecalis  Coag Negative Staphylococcus      .Surgical Swab None Specimen Source:  abdomen, esw  07-02-19   Moderate Escherichia coli  Moderate Alpha hemolytic strep "Susceptibilities not performed"  Moderate Strep mitis oralis group "Susceptibilities not performed"  Growth in fluid media only Enterococcus faecalis  --  Escherichia coli  Enterococcus faecalis      .Blood  07-01-19   No growth at 5 days.  --  --      .Urine  07-01-19   No growth  --  --          Rapid RVP Result: Dayron (07-16 @ 14:44)        RADIOLOGY: Follow Up:  Intraabdominal Abscesses    Interval History: No chills or fevers. Continues to have cough which is largely unchanged from last week. Abdominal pain is removed    REVIEW OF SYSTEMS  [  ] ROS unobtainable because:    [x  ] All other systems negative except as noted below    Constitutional:  [ ] fever [ ] chills  [ ] weight loss  [ ] weakness  Skin:  [ ] rash [ ] phlebitis	  Eyes: [ ] icterus [ ] pain  [ ] discharge	  ENMT: [ ] sore throat  [ ] thrush [ ] ulcers [ ] exudates  Respiratory: [ ] dyspnea [ ] hemoptysis [ x] cough [ ] sputum	  Cardiovascular:  [ ] chest pain [ ] palpitations [ ] edema	  Gastrointestinal:  [ ] nausea [ ] vomiting [ ] diarrhea [ ] constipation [ x] pain	  Genitourinary:  [ ] dysuria [ ] frequency [ ] hematuria [ ] discharge [ ] flank pain  [ ] incontinence  Musculoskeletal:  [ ] myalgias [ ] arthralgias [ ] arthritis  [ ] back pain  Neurological:  [ ] headache [ ] seizures  [ ] confusion/altered mental status    Allergies  Coreg (Other)  digoxin (Other; Short breath (Mild to Mod))  penicillins (Hives)        ANTIMICROBIALS:  nystatin    Suspension 736957 two times a day      OTHER MEDS:  MEDICATIONS  (STANDING):  acetaminophen   Tablet .. 650 every 6 hours PRN  ALBUTerol/ipratropium for Nebulization 3 every 6 hours  apixaban 5 every 12 hours  buDESOnide    Inhalation Suspension 0.5 every 12 hours  enalapril 10 every 12 hours  furosemide    Tablet 40 every 24 hours  insulin lispro (HumaLOG) corrective regimen sliding scale  three times a day before meals  insulin lispro (HumaLOG) corrective regimen sliding scale  at bedtime  montelukast 10 every 24 hours  pantoprazole    Tablet 40 two times a day  simvastatin 20 at bedtime  spironolactone 25 daily      Vital Signs Last 24 Hrs  T(C): 36.9 (22 Jul 2019 17:08), Max: 37.3 (22 Jul 2019 11:21)  T(F): 98.5 (22 Jul 2019 17:08), Max: 99.1 (22 Jul 2019 11:21)  HR: 108 (22 Jul 2019 17:08) (98 - 117)  BP: 118/65 (22 Jul 2019 17:08) (117/59 - 143/65)  BP(mean): 83 (22 Jul 2019 10:00) (81 - 93)  RR: 18 (22 Jul 2019 17:08) (18 - 36)  SpO2: 98% (22 Jul 2019 17:08) (95% - 100%)    PHYSICAL EXAMINATION:  General: Awake and alert, NAD  HEENT: PERRL, EOMI, No subconjunctival hemorrhages, Oropharynx Clear, MMM  Neck: Supple, No SARAHI  Cardiac: RRR, No M/R/G  Resp: CTAB, No Wh/Rh/Ra  Abdomen: Obese, +RLQ ostomy with moderate liquid stool output. +midline wound vac with serosanguinous drainage, LUQ drain removed, LLQ drain with serosanguinous material, NBS, ND, minimal abdominal pain today, No HSM, No rigidity or guarding  MSK: trace to 1+ LE edema and 2+ bilateral UE edema. No stigmata of IE. No evidence of phlebitis. No evidence of synovitis.  Skin: No rashes or lesions. Skin is warm and dry to the touch.   Neuro: Awake and alert. CN 2-12 Grossly intact. Moves all four extremities spontaneously.  Psych: Calm, pleasant cooperateive    07-22    132<L>  |  102  |  27<H>  ----------------------------<  142<H>  4.0   |  20<L>  |  0.50    Ca    8.0<L>      22 Jul 2019 01:53  Phos  2.5     07-22  Mg     1.6     07-22            MICROBIOLOGY:  v  .Blood  07-10-19   No growth at 5 days.  --  --      .Blood  07-10-19   No growth at 5 days.  --  --      .Body Fluid abdominal fluid  07-09-19   Few Enterococcus faecalis  Few Coag Negative Staphylococcus  --  Enterococcus faecalis  Coag Negative Staphylococcus      .Body Fluid abdominal fluid  07-09-19   Few Enterococcus faecalis  Few Coag Negative Staphylococcus  --  Enterococcus faecalis  Coag Negative Staphylococcus      .Surgical Swab None Specimen Source:  abdomen, esw  07-02-19   Moderate Escherichia coli  Moderate Alpha hemolytic strep "Susceptibilities not performed"  Moderate Strep mitis oralis group "Susceptibilities not performed"  Growth in fluid media only Enterococcus faecalis  --  Escherichia coli  Enterococcus faecalis      .Blood  07-01-19   No growth at 5 days.  --  --      .Urine  07-01-19   No growth  --  --    Rapid RVP Result: NotDetec (07-16 @ 14:44)    RADIOLOGY:    No new imaging was available for review at the time of assessment

## 2019-07-22 NOTE — CHART NOTE - NSCHARTNOTEFT_GEN_A_CORE
Interval Events: Patient transferred from SICU to surgical floor.    S: Patient doing well, denies fevers, chills, nausea, emesis, chest pain, SOB. Pain is well controlled. Patient c/o some nasal congestion, improved with intranasal saline drops.     O: Vital Signs  T(C): 37.1 (07-22 @ 13:52), Max: 37.3 (07-22 @ 11:21)  HR: 101 (07-22 @ 13:52) (98 - 119)  BP: 124/75 (07-22 @ 13:52) (117/59 - 143/65)  RR: 18 (07-22 @ 13:52) (18 - 36)  SpO2: 95% (07-22 @ 13:52) (95% - 100%)  07-21-19 @ 07:01  -  07-22-19 @ 07:00  --------------------------------------------------------  IN: 2724.9 mL / OUT: 2905 mL / NET: -180.1 mL    07-22-19 @ 07:01  -  07-22-19 @ 14:09  --------------------------------------------------------  IN: 436.5 mL / OUT: 2150 mL / NET: -1713.5 mL      General: alert and oriented, NAD  Resp: airway patent, respirations unlabored  CVS: regular rate and rhythm  Abdomen: soft, nontender, nondistended, abthera in place with good suction, BASIM in place with serosanguinous drainage in bulb, stoma pink  Extremities: no edema  Skin: warm, dry, appropriate color    07-22    132<L>  |  102  |  27<H>  ----------------------------<  142<H>  4.0   |  20<L>  |  0.50    Ca    8.0<L>      22 Jul 2019 01:53  Phos  2.5     07-22  Mg     1.6     07-22    Assessment/Plan:  ANDRE BARKER is a 74y Female with PMH of asthma, A fib, HFrEF on POD 16 from L hemicolectomy and end colostomy with VAC placement along with multiple washouts and closure with bridging mesh. Transferred to surgical floor from SICU, clinical condition improving.    - Diet: Dysphagia 1/Honey thick  - Continue with TPN  - IS encouraged  - Eliquis resumed    Plastics to perform graft for definitive closure of abd wound with granulation of tissue in 1-3 weeks    Green [id number]

## 2019-07-22 NOTE — AIRWAY REMOVAL NOTE  ADULT & PEDS - RESPIRATORY EXPANSION/ACCESSORY MUSCLES/RETRACTIONS
LRF 1-21-19 # 80 RF 1  LOV 1-21-19  RTO 6 mo  Left message for patient to call for appt    Health Maintenance   Topic Date Due    Pneumococcal 0-64 years Vaccine (1 of 1 - PPSV23) 07/23/1961    Breast cancer screen  07/23/2007    Shingles Vaccine (1 of 2) 07/23/2007    DTaP/Tdap/Td vaccine (1 - Tdap) 01/04/2020 (Originally 7/23/1976)    Flu vaccine (1) 09/01/2019    Cervical cancer screen  12/19/2019    Colon Cancer Screen FIT/FOBT  01/22/2020    Lipid screen  01/22/2024    Hepatitis C screen  Completed    HIV screen  Completed             (applicable per patient's age: Cancer Screenings, Depression Screening, Fall Risk Screening, Immunizations)    Hemoglobin A1C (%)   Date Value   01/22/2019 5.2     LDL Cholesterol (mg/dL)   Date Value   01/22/2019 119     AST (U/L)   Date Value   01/22/2019 20     ALT (U/L)   Date Value   01/22/2019 <5 (L)     BUN (mg/dL)   Date Value   01/22/2019 20      (goal A1C is < 7)   (goal LDL is <100) need 30-50% reduction from baseline     BP Readings from Last 3 Encounters:   04/01/19 133/85   01/21/19 120/86   12/17/18 138/82    (goal /80)      All Future Testing planned in [facility]:  Lab Frequency Next Occurrence   MRI Cervical Spine WO Contrast Once 01/31/2019   ZAIDA DIGITAL SCREEN W CAD BILATERAL Once 01/28/2020       Next Visit Date:  Future Appointments   Date Time Provider Martine Mendez   9/30/2019 10:40 AM SHIVA George - CNP Neuro Spec [id number]            Patient Active Problem List:     Parkinson's disease ([facility] [address]     Seasonal allergic rhinitis due to pollen     Asthma     Gastroesophageal reflux disease     Neck pain
expansion symmetric/no use of accessory muscles/no retractions
no retractions

## 2019-07-22 NOTE — PROGRESS NOTE ADULT - SUBJECTIVE AND OBJECTIVE BOX
[facility] NUTRITION SUPPORT / TPN -- FOLLOW UP NOTE  --------------------------------------------------------------------------------    24 hour events/subjective:  -Tolerating dysphagia diet, not eating much though  -No acute events overnight  -more alert later in the day, no n/v/d  - no f/c/s  - no cp/ palp/ sob  -Listed for tele floor      Diet:  Diet, Dysphagia 1 Pureed-Honey Consistency Fluid:   Consistent Carbohydrate Evening Snack (CSTCHOSN) (07-22-19 @ 09:49)      Appetite: [x  ]Poor [  ]Adequate [  ]Good  Caloric intake:  [ x  ]  Adequate   [   ] Inadequate    ROS: General/ GI see HPI  all other systems negative      ALLERGIES & MEDICATIONS  --------------------------------------------------------------------------------  ALLERGIES  Allergies  Coreg (Other)  digoxin (Other; Short breath (Mild to Mod))  penicillins (Hives)    Intolerances    metoprolol (Other)  Solu-Medrol (Other (Mild to Mod))    STANDING INPATIENT MEDICATIONS    ALBUTerol/ipratropium for Nebulization 3 milliLiter(s) Nebulizer every 6 hours  apixaban 5 milliGRAM(s) Oral every 12 hours  buDESOnide    Inhalation Suspension 0.5 milliGRAM(s) Inhalation every 12 hours  chlorhexidine 2% Cloths 1 Application(s) Topical daily  enalapril 10 milliGRAM(s) Oral every 12 hours  enoxaparin Injectable 85 milliGRAM(s) SubCutaneous two times a day  fat emulsion (Fish Oil and Plant Based) 20% Infusion 20.8 mL/Hr IV Continuous <Continuous>  ferrous    sulfate 325 milliGRAM(s) Oral daily  furosemide    Tablet 40 milliGRAM(s) Oral every 24 hours  insulin lispro (HumaLOG) corrective regimen sliding scale   SubCutaneous three times a day before meals  insulin lispro (HumaLOG) corrective regimen sliding scale   SubCutaneous at bedtime  montelukast 10 milliGRAM(s) Oral every 24 hours  nystatin    Suspension 924473 Unit(s) Oral two times a day  pantoprazole    Tablet 40 milliGRAM(s) Oral two times a day  Parenteral Nutrition - Adult 1 Each TPN Continuous <Continuous>  Parenteral Nutrition - Adult 1 Each TPN Continuous <Continuous>  simvastatin 20 milliGRAM(s) Oral at bedtime  spironolactone 25 milliGRAM(s) Oral daily      PRN INPATIENT MEDICATION  acetaminophen   Tablet .. 650 milliGRAM(s) Oral every 6 hours PRN  sodium chloride 0.65% Nasal 1 Spray(s) Both Nostrils daily PRN        VITALS/PHYSICAL EXAM  --------------------------------------------------------------------------------  T(C): 37.1 (07-22-19 @ 13:52), Max: 37.3 (07-22-19 @ 11:21)  HR: 101 (07-22-19 @ 13:52) (98 - 119)  BP: 124/75 (07-22-19 @ 13:52) (117/59 - 143/65)  RR: 18 (07-22-19 @ 13:52) (18 - 36)  SpO2: 95% (07-22-19 @ 13:52) (95% - 100%)  Wt(kg): --        07-21-19 @ 07:01  -  07-22-19 @ 07:00  --------------------------------------------------------  IN: 2724.9 mL / OUT: 2905 mL / NET: -180.1 mL    07-22-19 @ 07:01  -  07-22-19 @ 14:27  --------------------------------------------------------  IN: 436.5 mL / OUT: 2150 mL / NET: -1713.5 mL    Physical Exam:    	Gen: NAD, NGT in place, sleepy but arousable  	HEENT: NC/AT, PERRL, mucosa moist, supple neck, clear oropharynx  	Chest: clear  	GI: +softly distended, nontender, no BS                  (+)Ostomy pink & viable. liquid & soft formed stool                  VAC intact w/ good seal, drains w/ serosanguinous drainage              :  (+)Ching              MSK/Vascular: Passive ROM x4,  no clubbing, cyanosis, nor edema                   RUE PICC dressing C/D/I w/o SOI  	Neuro: Awake and alert  	Skin: Warm, good turgor, without rashes        LABS/ CULTURES/ RADIOLOGY:    132  |  102  |  27  ----------------------------<  142      [07-22-19 @ 01:53]  4.0   |  20  |  0.50        Ca     8.0     [07-22-19 @ 01:53]      Mg     1.6     [07-22-19 @ 01:53]      Phos  2.5     [07-22-19 @ 01:53]    CAPILLARY BLOOD GLUCOSE  POCT Blood Glucose.: 152 mg/dL (22 Jul 2019 11:32)  POCT Blood Glucose.: 173 mg/dL (22 Jul 2019 07:56)  POCT Blood Glucose.: 132 mg/dL (21 Jul 2019 21:28)  POCT Blood Glucose.: 153 mg/dL (21 Jul 2019 16:47)    Prealbumin, Serum: 5 mg/dL (07-04-19 @ 22:45)

## 2019-07-22 NOTE — PROGRESS NOTE ADULT - ATTENDING COMMENTS
Tolerating PO intake poorly  1.  Protein calorie malnutrition, severe--improved over santi but witnessed PO intake very modest.  Consider nocturnal feeding with soft NG tube and ad isabelle day time PO supplements as major transition PN-->EN/PO  2.  Hyperglycemia--adjusting insulin daily.  Control quite good  3.  Hyperkalemia--TPN adjusted  4.  HyperNa--minimal TPN Na.  Free water supplementation.  Metolazone, thiazide>loop to minimize free water losses

## 2019-07-22 NOTE — PROGRESS NOTE ADULT - ASSESSMENT
74 year old female PMHx of HFrEF s/p AICD, asthma, HTN, HLD, atrial fibrillation on eliquis who presented to [facility] on 6/30 with diverticulitis at splenic flexure with perforation on repeat scan on 7/1.  On 7/2 patient underwent Exploratory laparotomy, partial colectomy, end colostomy, and placement of temporary abdominal VAC dressing. Intraoperatively was noted of having perforation of distal transverse colon with feculent peritonitis. On 7/3 patient underwent Reopening of abdominal wound and an abdominal washout. On 7/9 patient RTOR for Abthera Vac placement and was found to have viable Edematous bowel and minimal pus-like fluid in pelvis which was sent for culture. On 7/10 hypotensive to SBP in the 80's and broadened to Imipenem. Wound cultures from 7/2/19 with E. coli, AHS, Strep mitis and Enterococcus faecalis (amp susceptible).    Ciprofloxacin 6/30 - 7/1  Aztreonam 7/2 - 7/4  Flagyl 6/30 - 7/9  Ceftriaxone 7/4 - 7/9  Imipenem 7/10 -- 7/20    Overall, feculent peritonitis and resolved septic shock secondary to perforated diverticulitis.   7/9 surgical cultures with enterococcus. Imipenem offers coverage for ampicillin susceptible enterococcus   CoNS noted on 7/9 surgical cultures - suspect it is possible contaminant  7/10 blood cultures negative  7/11 RTOR with no evidence of purulence/infection noted  Patient developed cough but CT Chest (7/16) with mucous plugging - no evidence of pneumonia. RVP negative  CT A/P (7/16) with ascites but no abscess  Completed course of Imipenem    #Perforated Diverticulitis  --Monitor off of antibiotics  --Recommend BCx with new fever    #Cough  --Recommend repeat CXR and sputum culture if cough does not improve    #Encephalopathy (improved)     #Leukocytosis - likely related to prior infection  --Continue to trend    I will sign off at this time. Please feel free to contact me with any further questions or concerns    Rex Goode M.D.  [facility] Division of Infectious Disease  8AM-5PM: Pager Number [phone]  After Hours: Please contact the Infectious Diseases Office at [phone] 74 year old female PMHx of HFrEF s/p AICD, asthma, HTN, HLD, atrial fibrillation on eliquis who presented to [facility] on 6/30 with diverticulitis at splenic flexure with perforation on repeat scan on 7/1.  On 7/2 patient underwent Exploratory laparotomy, partial colectomy, end colostomy, and placement of temporary abdominal VAC dressing. Intraoperatively was noted of having perforation of distal transverse colon with feculent peritonitis. On 7/3 patient underwent Reopening of abdominal wound and an abdominal washout. On 7/9 patient RTOR for Abthera Vac placement and was found to have viable Edematous bowel and minimal pus-like fluid in pelvis which was sent for culture. On 7/10 hypotensive to SBP in the 80's and broadened to Imipenem. Wound cultures from 7/2/19 with E. coli, AHS, Strep mitis and Enterococcus faecalis (amp susceptible).    Ciprofloxacin 6/30 - 7/1  Aztreonam 7/2 - 7/4  Flagyl 6/30 - 7/9  Ceftriaxone 7/4 - 7/9  Imipenem 7/10 -- 7/21    Overall, feculent peritonitis and resolved septic shock secondary to perforated diverticulitis.   7/9 surgical cultures with enterococcus. Imipenem offers coverage for ampicillin susceptible enterococcus   CoNS noted on 7/9 surgical cultures - suspect it is possible contaminant  7/10 blood cultures negative  7/11 RTOR with no evidence of purulence/infection noted  Patient developed cough but CT Chest (7/16) with mucous plugging - no evidence of pneumonia. RVP negative  CT A/P (7/16) with ascites but no abscess  Completed course of Imipenem    #Perforated Diverticulitis  --Monitor off of antibiotics  --Recommend BCx with new fever    #Cough  --Recommend repeat CXR and sputum culture if cough does not improve    #Encephalopathy (improved)     #Leukocytosis - likely related to prior infection  --Continue to trend    I will sign off at this time. Please feel free to contact me with any further questions or concerns    Rex Goode M.D.  [facility] Division of Infectious Disease  8AM-5PM: Pager Number [phone]  After Hours: Please contact the Infectious Diseases Office at [phone]

## 2019-07-22 NOTE — PROGRESS NOTE ADULT - ATTENDING COMMENTS
Hemodynamically stable, compensated CHF on Enalapril, Lasix Spironolactone  HR controlled  On Duoneb and ICS for asthma  On full dose AC with Apixaban  Can start PO, wean off TPN  PT  Transfer

## 2019-07-22 NOTE — PROGRESS NOTE ADULT - ASSESSMENT
73 yo F with PMHx of HFrEF (EF 35% now improved to 45%) s/p CRT-D, asthma (not on BB d/t severity), HTN, HLD, atrial fibrillation on eliquis, who p/w severe, sudden onset L sided abd pain, found to have proximal descending colon/splenic flexure acute diverticulitis with evidence of contained perforation, now s/p exploratory laparotomy, left hemicolectomy, end colostomy, and ABThera VAC placement on 7/1. Transferred back to the SICU immediately postop requiring high doses of pressors, now off and being treated with broad spectrum abx. S/P abdominal washout x3 (last 7/9). Returned to OR 7/11 for abdominal closure. Vac placed. She was extubated 7/12 AM to BiPAP. She is generally normotensive tolerating IV enalaprilat. Net positive nearly 1L with stable bed weight from yesterday on lasix 20mg IV. 1.7L UOP in response to current dose. Currently does not appear to have evidence of elevated filling pressures. Peripheral edema is likely related to low albumin of 1.7. 75 yo F with a PMHx significant for HFrEF (EF 35% now improved to 45%) s/p CRT-D, asthma (not on BB d/t severity), HTN, HLD, AF on eliquis, who p/w severe, sudden onset L sided abd pain, found to have proximal descending colon/splenic flexure acute diverticulitis with evidence of contained perforation, now s/p exploratory laparotomy, left hemicolectomy, end colostomy, and ABThera VAC placement on 7/1. Hospital course complicated by septic shock and abdominal washout x3 (last 7/9). She returned to OR 7/11 for abdominal closure and wound vac placement. She was extubated 7/12, now on PO medications and TPN.

## 2019-07-22 NOTE — PROGRESS NOTE ADULT - SUBJECTIVE AND OBJECTIVE BOX
Patient seen and examined at bedside.    Overnight Events:     Review Of Systems: No chest pain, shortness of breath, or palpitations            Current Meds:  acetaminophen   Tablet .. 650 milliGRAM(s) Oral every 6 hours PRN  ALBUTerol/ipratropium for Nebulization 3 milliLiter(s) Nebulizer every 6 hours  apixaban 5 milliGRAM(s) Oral every 12 hours  buDESOnide    Inhalation Suspension 0.5 milliGRAM(s) Inhalation every 12 hours  chlorhexidine 2% Cloths 1 Application(s) Topical daily  enalapril 10 milliGRAM(s) Oral every 12 hours  enoxaparin Injectable 85 milliGRAM(s) SubCutaneous two times a day  fat emulsion (Fish Oil and Plant Based) 20% Infusion 20.8 mL/Hr IV Continuous <Continuous>  ferrous    sulfate 325 milliGRAM(s) Oral daily  furosemide    Tablet 40 milliGRAM(s) Oral every 24 hours  insulin lispro (HumaLOG) corrective regimen sliding scale   SubCutaneous three times a day before meals  insulin lispro (HumaLOG) corrective regimen sliding scale   SubCutaneous at bedtime  montelukast 10 milliGRAM(s) Oral every 24 hours  nystatin    Suspension 695223 Unit(s) Oral two times a day  pantoprazole    Tablet 40 milliGRAM(s) Oral two times a day  Parenteral Nutrition - Adult 1 Each TPN Continuous <Continuous>  Parenteral Nutrition - Adult 1 Each TPN Continuous <Continuous>  simvastatin 20 milliGRAM(s) Oral at bedtime  sodium chloride 0.65% Nasal 1 Spray(s) Both Nostrils daily PRN  spironolactone 25 milliGRAM(s) Oral daily      Vitals:  T(F): 99.1 (07-22), Max: 99.1 (07-22)  HR: 117 (07-22) (98 - 119)  BP: 131/79 (07-22) (117/59 - 143/65)  RR: 21 (07-22)  SpO2: 98% (07-22)  I&O's Summary    21 Jul 2019 07:01  -  22 Jul 2019 07:00  --------------------------------------------------------  IN: 2724.9 mL / OUT: 2905 mL / NET: -180.1 mL    22 Jul 2019 07:01  -  22 Jul 2019 12:59  --------------------------------------------------------  IN: 436.5 mL / OUT: 1625 mL / NET: -1188.5 mL        Physical Exam:  Appearance: No acute distress; well appearing  HEENT:  EOMI, sclera anicteric, Normal oral mucosa  Cardiovascular: RRR, S1, S2, no murmurs, rubs, or gallops; no edema; no JVD  Respiratory: Clear to auscultation bilaterally, no wheezes, rales, rhonchi  Gastrointestinal: soft, non-tender, non-distended with normal bowel sounds  Musculoskeletal: No clubbing; no joint deformity   Neurologic: Non-focal  Lymphatic: No lymphadenopathy  Psychiatry: AAOx3, mood & affect appropriate  Skin: No rashes, ecchymoses, or cyanosis      07-22    132<L>  |  102  |  27<H>  ----------------------------<  142<H>  4.0   |  20<L>  |  0.50    Ca    8.0<L>      22 Jul 2019 01:53  Phos  2.5     07-22  Mg     1.6     07-22        CARDIAC MARKERS ( 19 Jul 2019 03:53 )  42 ng/L / x     / x     / x     / x     / x      CARDIAC MARKERS ( 16 Jul 2019 09:31 )  34 ng/L / x     / x     / x     / x     / x          Echo:  7/3/19  Conclusions:  1. Mitral annular calcification. Peak mitral valve gradient  equals 9 mm Hg, mean transmitral valve gradient equals 5 mm  Hg, consistent with moderate mitral stenosis.  2. Aortic valve not well visualized; appears calcified.  Peak transaortic valve gradient equals 25 mm Hg, mean  transaortic valve gradient equals 12 mm Hg, aortic valve  velocity time integral equals 34 cm, consistent with mild  aortic stenosis.  3. Moderate segmental left ventricular systolic  dysfunction. Difficult to assess LV function. Not all  myocardial segments well visualized. Endocardial  visualization enhanced with intravenous injection of  Ultrasonic Enhancing Agent (Definity). The apex appears  aneurysmal. No obvious left ventricular thrombus.  4. The right ventricle is not well visualized.A device  wire is noted in the right heart.      Cath:  3/8/18  CORONARY VESSELS: The coronary circulation is right dominant.  LM:   --  LM: Normal.  LAD:   --  LAD: Normal.  CX:   --  Circumflex: Normal.  RCA:   --  RCA: Normal.  COMPLICATIONS: There were no complications.  DIAGNOSTIC RECOMMENDATIONS: The patient should continue with the present  medications.    Interpretation of Telemetry: Patient seen and examined at bedside.    Overnight Events: Patient transferred out of SICU. Sleeping comfortably after recent vac change, daughter at bedside. Patient was transitioned to PO medications on 7/20. She is now tolerating some PO intake but it is not adequate so she is still receiving TPN.       Current Meds:  acetaminophen   Tablet .. 650 milliGRAM(s) Oral every 6 hours PRN  ALBUTerol/ipratropium for Nebulization 3 milliLiter(s) Nebulizer every 6 hours  apixaban 5 milliGRAM(s) Oral every 12 hours  buDESOnide    Inhalation Suspension 0.5 milliGRAM(s) Inhalation every 12 hours  chlorhexidine 2% Cloths 1 Application(s) Topical daily  enalapril 10 milliGRAM(s) Oral every 12 hours  enoxaparin Injectable 85 milliGRAM(s) SubCutaneous two times a day  fat emulsion (Fish Oil and Plant Based) 20% Infusion 20.8 mL/Hr IV Continuous <Continuous>  ferrous    sulfate 325 milliGRAM(s) Oral daily  furosemide    Tablet 40 milliGRAM(s) Oral every 24 hours  insulin lispro (HumaLOG) corrective regimen sliding scale   SubCutaneous three times a day before meals  insulin lispro (HumaLOG) corrective regimen sliding scale   SubCutaneous at bedtime  montelukast 10 milliGRAM(s) Oral every 24 hours  nystatin    Suspension 375100 Unit(s) Oral two times a day  pantoprazole    Tablet 40 milliGRAM(s) Oral two times a day  Parenteral Nutrition - Adult 1 Each TPN Continuous <Continuous>  Parenteral Nutrition - Adult 1 Each TPN Continuous <Continuous>  simvastatin 20 milliGRAM(s) Oral at bedtime  sodium chloride 0.65% Nasal 1 Spray(s) Both Nostrils daily PRN  spironolactone 25 milliGRAM(s) Oral daily      Vitals:  T(F): 99.1 (07-22), Max: 99.1 (07-22)  HR: 117 (07-22) (98 - 119)  BP: 131/79 (07-22) (117/59 - 143/65)  RR: 21 (07-22)  SpO2: 98% (07-22)  I&O's Summary    21 Jul 2019 07:01  -  22 Jul 2019 07:00  --------------------------------------------------------  IN: 2724.9 mL / OUT: 2905 mL / NET: -180.1 mL    22 Jul 2019 07:01  -  22 Jul 2019 12:59  --------------------------------------------------------  IN: 436.5 mL / OUT: 1625 mL / NET: -1188.5 mL        Physical Exam:  Appearance: No acute distress; sleeping comfortably  HEENT:  EOMI, sclera anicteric, Normal oral mucosa  Cardiovascular: RRR, S1, S2, no murmurs, rubs, or gallops; no edema; JVP 6 cm  Respiratory: Coarse breath sounds bilaterally  Gastrointestinal: soft, Right sided wound vac, BASIM drain x 1  Musculoskeletal: No clubbing; no joint deformity   Neurologic: Non-focal  Skin: No rashes, ecchymoses, or cyanosis      07-22    132<L>  |  102  |  27<H>  ----------------------------<  142<H>  4.0   |  20<L>  |  0.50    Ca    8.0<L>      22 Jul 2019 01:53  Phos  2.5     07-22  Mg     1.6     07-22        CARDIAC MARKERS ( 19 Jul 2019 03:53 )  42 ng/L / x     / x     / x     / x     / x      CARDIAC MARKERS ( 16 Jul 2019 09:31 )  34 ng/L / x     / x     / x     / x     / x          Echo:  7/3/19  Conclusions:  1. Mitral annular calcification. Peak mitral valve gradient  equals 9 mm Hg, mean transmitral valve gradient equals 5 mm  Hg, consistent with moderate mitral stenosis.  2. Aortic valve not well visualized; appears calcified.  Peak transaortic valve gradient equals 25 mm Hg, mean  transaortic valve gradient equals 12 mm Hg, aortic valve  velocity time integral equals 34 cm, consistent with mild  aortic stenosis.  3. Moderate segmental left ventricular systolic  dysfunction. Difficult to assess LV function. Not all  myocardial segments well visualized. Endocardial  visualization enhanced with intravenous injection of  Ultrasonic Enhancing Agent (Definity). The apex appears  aneurysmal. No obvious left ventricular thrombus.  4. The right ventricle is not well visualized.A device  wire is noted in the right heart.      Cath:  3/8/18  CORONARY VESSELS: The coronary circulation is right dominant.  LM:   --  LM: Normal.  LAD:   --  LAD: Normal.  CX:   --  Circumflex: Normal.  RCA:   --  RCA: Normal.  COMPLICATIONS: There were no complications.  DIAGNOSTIC RECOMMENDATIONS: The patient should continue with the present  medications.

## 2019-07-22 NOTE — PROGRESS NOTE ADULT - SUBJECTIVE AND OBJECTIVE BOX
Interval Events:    Left upper garcia drain pulled    S: Patient doing well, denies fevers, chills, nausea, emesis, chest pain, SOB.  Pain is well controlled. Having some ostomy function.    O: Vital Signs Last 24 Hrs  T(C): 36.7 (22 Jul 2019 03:00), Max: 37.1 (21 Jul 2019 19:10)  T(F): 98 (22 Jul 2019 03:00), Max: 98.7 (21 Jul 2019 19:10)  HR: 107 (22 Jul 2019 03:00) (100 - 120)  BP: 132/63 (22 Jul 2019 03:00) (114/56 - 148/95)  BP(mean): 91 (22 Jul 2019 03:00) (80 - 107)  RR: 31 (22 Jul 2019 03:00) (17 - 36)  SpO2: 98% (22 Jul 2019 03:00) (97% - 100%)      20 Jul 2019 07:01  -  21 Jul 2019 07:00  --------------------------------------------------------  IN:    fat emulsion (Fish Oil and Plant Based) 20% Infusion: 270.4 mL    Solution: 100 mL    Solution: 300 mL    TPN (Total Parenteral Nutrition): 1992 mL  Total IN: 2662.4 mL    OUT:    Colostomy: 75 mL    Drain: 15 mL    Drain: 5 mL    VAC (Vacuum Assisted Closure) System: 200 mL    Voided: 1700 mL  Total OUT: 1995 mL    Total NET: 667.4 mL      21 Jul 2019 07:01  -  22 Jul 2019 04:34  --------------------------------------------------------  IN:    fat emulsion (Fish Oil and Plant Based) 20% Infusion: 228.8 mL    Oral Fluid: 100 mL    Solution: 50 mL    Solution: 200 mL    TPN (Total Parenteral Nutrition): 1743 mL  Total IN: 2321.8 mL    OUT:    Colostomy: 500 mL    Drain: 5 mL    Voided: 2150 mL  Total OUT: 2655 mL    Total NET: -333.2 mL          Physical Exam:    Gen: Well-developed, well-nourished in no acute distress  Resp: Clear to auscultation bilaterally with no wheezes, rale, or rhonchi  CV: Regular rate and rhythm with no murmur, gallop, or rub  GI: Soft, non-tender, non-distended with normoactive bowel sounds.  No masses.  MSK: Moves all extremities equally  Skin: No rashes    Labs:    22 Jul 2019 01:53    132    |  102    |  27     ----------------------------<  142    4.0     |  20     |  0.50     Ca    8.0        22 Jul 2019 01:53  Phos  2.5       22 Jul 2019 01:53  Mg     1.6       22 Jul 2019 01:53        CAPILLARY BLOOD GLUCOSE      POCT Blood Glucose.: 132 mg/dL (21 Jul 2019 21:28)  POCT Blood Glucose.: 153 mg/dL (21 Jul 2019 16:47)  POCT Blood Glucose.: 121 mg/dL (21 Jul 2019 11:07)  POCT Blood Glucose.: 154 mg/dL (21 Jul 2019 07:08)      MEDICATIONS  (STANDING):  ALBUTerol/ipratropium for Nebulization 3 milliLiter(s) Nebulizer every 6 hours  buDESOnide    Inhalation Suspension 0.5 milliGRAM(s) Inhalation every 12 hours  chlorhexidine 2% Cloths 1 Application(s) Topical daily  enalapril 10 milliGRAM(s) Oral every 12 hours  enoxaparin Injectable 85 milliGRAM(s) SubCutaneous two times a day  fat emulsion (Fish Oil and Plant Based) 20% Infusion 20.8 mL/Hr (20.8 mL/Hr) IV Continuous <Continuous>  ferrous    sulfate 325 milliGRAM(s) Oral daily  furosemide    Tablet 40 milliGRAM(s) Oral every 24 hours  insulin lispro (HumaLOG) corrective regimen sliding scale   SubCutaneous three times a day before meals  insulin lispro (HumaLOG) corrective regimen sliding scale   SubCutaneous at bedtime  magnesium sulfate  IVPB 2 Gram(s) IV Intermittent every 1 hour  montelukast 10 milliGRAM(s) Oral every 24 hours  nystatin    Suspension 407733 Unit(s) Oral two times a day  pantoprazole    Tablet 40 milliGRAM(s) Oral two times a day  Parenteral Nutrition - Adult 1 Each (83 mL/Hr) TPN Continuous <Continuous>  simvastatin 20 milliGRAM(s) Oral at bedtime  sodium phosphate IVPB 15 milliMole(s) IV Intermittent once  spironolactone 25 milliGRAM(s) Oral daily    MEDICATIONS  (PRN):  acetaminophen   Tablet .. 650 milliGRAM(s) Oral every 6 hours PRN Mild Pain (1 - 3)  sodium chloride 0.65% Nasal 1 Spray(s) Both Nostrils daily PRN Nasal Congestion

## 2019-07-22 NOTE — PROGRESS NOTE ADULT - PROBLEM SELECTOR PLAN 1
- Continue enalapril 10mg PO BID, aldactone 25mg PO daily, furosemide 40mg PO daily  - No beta blockers due to severe bronchospastic disease/asthma and known previous intolerance

## 2019-07-23 LAB
ANION GAP SERPL CALC-SCNC: 13 MMOL/L — SIGNIFICANT CHANGE UP (ref 5–17)
APPEARANCE UR: CLEAR — SIGNIFICANT CHANGE UP
BACTERIA # UR AUTO: 0 — SIGNIFICANT CHANGE UP
BILIRUB UR-MCNC: NEGATIVE — SIGNIFICANT CHANGE UP
BUN SERPL-MCNC: 32 MG/DL — HIGH (ref 7–23)
CALCIUM SERPL-MCNC: 8.7 MG/DL — SIGNIFICANT CHANGE UP (ref 8.4–10.5)
CHLORIDE SERPL-SCNC: 104 MMOL/L — SIGNIFICANT CHANGE UP (ref 96–108)
CO2 SERPL-SCNC: 18 MMOL/L — LOW (ref 22–31)
COLOR SPEC: SIGNIFICANT CHANGE UP
CREAT SERPL-MCNC: 0.51 MG/DL — SIGNIFICANT CHANGE UP (ref 0.5–1.3)
DIFF PNL FLD: ABNORMAL
EPI CELLS # UR: 1 /HPF — SIGNIFICANT CHANGE UP
GLUCOSE BLDC GLUCOMTR-MCNC: 141 MG/DL — HIGH (ref 70–99)
GLUCOSE BLDC GLUCOMTR-MCNC: 152 MG/DL — HIGH (ref 70–99)
GLUCOSE BLDC GLUCOMTR-MCNC: 162 MG/DL — HIGH (ref 70–99)
GLUCOSE BLDC GLUCOMTR-MCNC: 166 MG/DL — HIGH (ref 70–99)
GLUCOSE BLDC GLUCOMTR-MCNC: 194 MG/DL — HIGH (ref 70–99)
GLUCOSE SERPL-MCNC: 158 MG/DL — HIGH (ref 70–99)
GLUCOSE UR QL: NEGATIVE — SIGNIFICANT CHANGE UP
GRAM STN FLD: SIGNIFICANT CHANGE UP
HCT VFR BLD CALC: 26.1 % — LOW (ref 34.5–45)
HGB BLD-MCNC: 7.9 G/DL — LOW (ref 11.5–15.5)
HYALINE CASTS # UR AUTO: 1 /LPF — SIGNIFICANT CHANGE UP (ref 0–2)
KETONES UR-MCNC: NEGATIVE — SIGNIFICANT CHANGE UP
LEUKOCYTE ESTERASE UR-ACNC: NEGATIVE — SIGNIFICANT CHANGE UP
MAGNESIUM SERPL-MCNC: 1.9 MG/DL — SIGNIFICANT CHANGE UP (ref 1.6–2.6)
MCHC RBC-ENTMCNC: 30.3 GM/DL — LOW (ref 32–36)
MCHC RBC-ENTMCNC: 32.9 PG — SIGNIFICANT CHANGE UP (ref 27–34)
MCV RBC AUTO: 108 FL — HIGH (ref 80–100)
NITRITE UR-MCNC: NEGATIVE — SIGNIFICANT CHANGE UP
PH UR: 6 — SIGNIFICANT CHANGE UP (ref 5–8)
PHOSPHATE SERPL-MCNC: 2.6 MG/DL — SIGNIFICANT CHANGE UP (ref 2.5–4.5)
PLATELET # BLD AUTO: 406 K/UL — HIGH (ref 150–400)
POTASSIUM SERPL-MCNC: 4.9 MMOL/L — SIGNIFICANT CHANGE UP (ref 3.5–5.3)
POTASSIUM SERPL-SCNC: 4.9 MMOL/L — SIGNIFICANT CHANGE UP (ref 3.5–5.3)
PROT UR-MCNC: SIGNIFICANT CHANGE UP
RBC # BLD: 2.41 M/UL — LOW (ref 3.8–5.2)
RBC # FLD: 20.7 % — HIGH (ref 10.3–14.5)
RBC CASTS # UR COMP ASSIST: 14 /HPF — HIGH (ref 0–4)
SODIUM SERPL-SCNC: 135 MMOL/L — SIGNIFICANT CHANGE UP (ref 135–145)
SP GR SPEC: 1.02 — SIGNIFICANT CHANGE UP (ref 1.01–1.02)
SPECIMEN SOURCE: SIGNIFICANT CHANGE UP
UROBILINOGEN FLD QL: NEGATIVE — SIGNIFICANT CHANGE UP
WBC # BLD: 18.1 K/UL — HIGH (ref 3.8–10.5)
WBC # FLD AUTO: 18.1 K/UL — HIGH (ref 3.8–10.5)
WBC UR QL: 3 /HPF — SIGNIFICANT CHANGE UP (ref 0–5)

## 2019-07-23 PROCEDURE — 99233 SBSQ HOSP IP/OBS HIGH 50: CPT

## 2019-07-23 RX ORDER — ELECTROLYTE SOLUTION,INJ
1 VIAL (ML) INTRAVENOUS
Refills: 0 | Status: DISCONTINUED | OUTPATIENT
Start: 2019-07-23 | End: 2019-07-23

## 2019-07-23 RX ORDER — I.V. FAT EMULSION 20 G/100ML
12.5 EMULSION INTRAVENOUS
Qty: 30 | Refills: 0 | Status: DISCONTINUED | OUTPATIENT
Start: 2019-07-23 | End: 2019-07-25

## 2019-07-23 RX ORDER — ERYTHROPOIETIN 10000 [IU]/ML
20000 INJECTION, SOLUTION INTRAVENOUS; SUBCUTANEOUS
Refills: 0 | Status: DISCONTINUED | OUTPATIENT
Start: 2019-07-23 | End: 2019-07-26

## 2019-07-23 RX ADMIN — I.V. FAT EMULSION 12.5 ML/HR: 20 EMULSION INTRAVENOUS at 18:35

## 2019-07-23 RX ADMIN — PANTOPRAZOLE SODIUM 40 MILLIGRAM(S): 20 TABLET, DELAYED RELEASE ORAL at 18:23

## 2019-07-23 RX ADMIN — PANTOPRAZOLE SODIUM 40 MILLIGRAM(S): 20 TABLET, DELAYED RELEASE ORAL at 06:01

## 2019-07-23 RX ADMIN — Medication 3 MILLILITER(S): at 12:19

## 2019-07-23 RX ADMIN — Medication 2: at 08:23

## 2019-07-23 RX ADMIN — Medication 325 MILLIGRAM(S): at 12:19

## 2019-07-23 RX ADMIN — MONTELUKAST 10 MILLIGRAM(S): 4 TABLET, CHEWABLE ORAL at 12:18

## 2019-07-23 RX ADMIN — Medication 3 MILLILITER(S): at 00:44

## 2019-07-23 RX ADMIN — Medication 0.5 MILLIGRAM(S): at 18:23

## 2019-07-23 RX ADMIN — APIXABAN 5 MILLIGRAM(S): 2.5 TABLET, FILM COATED ORAL at 18:23

## 2019-07-23 RX ADMIN — Medication 10 MILLIGRAM(S): at 06:02

## 2019-07-23 RX ADMIN — APIXABAN 5 MILLIGRAM(S): 2.5 TABLET, FILM COATED ORAL at 06:02

## 2019-07-23 RX ADMIN — Medication 3 MILLILITER(S): at 18:23

## 2019-07-23 RX ADMIN — Medication 3 MILLILITER(S): at 22:24

## 2019-07-23 RX ADMIN — Medication 500000 UNIT(S): at 06:02

## 2019-07-23 RX ADMIN — SPIRONOLACTONE 25 MILLIGRAM(S): 25 TABLET, FILM COATED ORAL at 06:02

## 2019-07-23 RX ADMIN — SIMVASTATIN 20 MILLIGRAM(S): 20 TABLET, FILM COATED ORAL at 22:22

## 2019-07-23 RX ADMIN — Medication 2: at 13:48

## 2019-07-23 RX ADMIN — Medication 500000 UNIT(S): at 18:23

## 2019-07-23 RX ADMIN — Medication 10 MILLIGRAM(S): at 18:23

## 2019-07-23 RX ADMIN — Medication 0.5 MILLIGRAM(S): at 06:02

## 2019-07-23 RX ADMIN — Medication 1 EACH: at 18:34

## 2019-07-23 RX ADMIN — Medication 3 MILLILITER(S): at 06:02

## 2019-07-23 RX ADMIN — Medication 40 MILLIGRAM(S): at 06:02

## 2019-07-23 NOTE — CHART NOTE - NSCHARTNOTEFT_GEN_A_CORE
74F admitted with abdominal pain and exam findings consistent with peritonitis secondary to acute diverticulitis at the splenic flexure with evidence of contained perforation now s/p Exploratory laparotomy, left hemicolectomy, end colostomy, and ABThera Vac placement and s/p from RTOR for abdominal washout and Abthera vac placement, off pressors. TPN started 7/5 for Protein-Calorie Malnutrition.  Pt is doing well and has been transferred to Tele floor.  Pt tolerating Dysphagia ADA diet.  It was brought to my attention that the patient's new bag of TPN yesterday was set at 83cc/hr instead of the 50cc/hr ordered. Pt's TPN bag finished early and this is what investigation found.  Pt AVSS.  FS today 194- [phone].  Pt tolerating diet and more alert today.  No n/v/d, cp/sob/ palp.  No lightheadedness, dizziness.   Pt's TPN order today was decreased which will have less insulin.  Unit Mngr notified.  Team & RN aware.  Will continue to closely monitor as per protocol.  Remain available as requested.

## 2019-07-23 NOTE — SWALLOW BEDSIDE ASSESSMENT ADULT - SLP PERTINENT HISTORY OF CURRENT PROBLEM
74 F Tenriism (cannot receive blood transfusions) with PMHx of HFrEF s/p AICD, asthma, HTN, HLD, atrial fibrillation on eliquis p/w severe, sudden onset L sided abd pain that started morning of 6/30. Patient had just finished her meal when she developed left sided abdominal pain. She reports nausea but no vomiting. Upon arrival to ED, patient with stable vitals. She received morphine for pain. Shortly after, +palpitations and SOB. She started to have wheezing and became hypoxic. Patient admitted to MICU for management of respiratory and heart failure.

## 2019-07-23 NOTE — PROGRESS NOTE ADULT - SUBJECTIVE AND OBJECTIVE BOX
[facility] NUTRITION SUPPORT / TPN -- FOLLOW UP NOTE  --------------------------------------------------------------------------------    24 hour events/subjective:  - Pt more awake/ alert  -Tolerating dysphagia diet, eating a little more  -No acute events overnight  -more alert later in the day, no n/v/d  - no f/c/s  - no cp/ palp/ sob  -Pt on tele on 9monti  - Encouraged eating and oob to chair  - PT to follow- pt for MATT d/c    Diet:  Diet, Dysphagia 1 Pureed-Honey Consistency Fluid:   Consistent Carbohydrate Evening Snack (CSTCHOSN) (07-22-19 @ 09:49)      Appetite: [x  ]Poor [  ]Adequate [  ]Good  Caloric intake:  [  x ]  Adequate   [   ] Inadequate    ROS: General/ GI see HPI  all other systems negative      ALLERGIES & MEDICATIONS  --------------------------------------------------------------------------------  ALLERGIES  Coreg (Other)  digoxin (Other; Short breath (Mild to Mod))  penicillins (Hives)      INTOLERANCES  metoprolol (Other)  Solu-Medrol (Other (Mild to Mod))      STANDING INPATIENT MEDICATIONS    ALBUTerol/ipratropium for Nebulization 3 milliLiter(s) Nebulizer every 6 hours  apixaban 5 milliGRAM(s) Oral every 12 hours  buDESOnide    Inhalation Suspension 0.5 milliGRAM(s) Inhalation every 12 hours  chlorhexidine 2% Cloths 1 Application(s) Topical daily  enalapril 10 milliGRAM(s) Oral every 12 hours  epoetin dejuan Injectable 44430 Unit(s) SubCutaneous <User Schedule>  fat emulsion (Fish Oil and Plant Based) 20% Infusion 12.5 mL/Hr IV Continuous <Continuous>  ferrous    sulfate 325 milliGRAM(s) Oral daily  furosemide    Tablet 40 milliGRAM(s) Oral every 24 hours  insulin lispro (HumaLOG) corrective regimen sliding scale   SubCutaneous three times a day before meals  insulin lispro (HumaLOG) corrective regimen sliding scale   SubCutaneous at bedtime  montelukast 10 milliGRAM(s) Oral every 24 hours  nystatin    Suspension 206877 Unit(s) Oral two times a day  pantoprazole    Tablet 40 milliGRAM(s) Oral two times a day  Parenteral Nutrition - Adult 1 Each TPN Continuous <Continuous>  Parenteral Nutrition - Adult 1 Each TPN Continuous <Continuous>  simvastatin 20 milliGRAM(s) Oral at bedtime  spironolactone 25 milliGRAM(s) Oral daily      PRN INPATIENT MEDICATION  acetaminophen   Tablet .. 650 milliGRAM(s) Oral every 6 hours PRN  sodium chloride 0.65% Nasal 1 Spray(s) Both Nostrils daily PRN        VITALS/PHYSICAL EXAM  --------------------------------------------------------------------------------  T(C): 37.2 (07-23-19 @ 13:52), Max: 37.2 (07-23-19 @ 13:52)  HR: 82 (07-23-19 @ 13:52) (82 - 116)  BP: 126/79 (07-23-19 @ 13:52) (118/65 - 127/78)  RR: 18 (07-23-19 @ 13:52) (16 - 18)  SpO2: 95% (07-23-19 @ 13:52) (95% - 99%)  Wt(kg): --        07-22-19 @ 07:01  -  07-23-19 @ 07:00  --------------------------------------------------------  IN: 2253.3 mL / OUT: 3010 mL / NET: -756.7 mL    07-23-19 @ 07:01  -  07-23-19 @ 15:16  --------------------------------------------------------  IN: 100 mL / OUT: 605 mL / NET: -505 mL      Physical Exam:    	Gen: NAD, NGT in place, A&Ox3  	HEENT: NC/AT, PERRL, mucosa moist, supple neck, clear oropharynx  	Chest: clear  	GI: +softly distended, nontender, no BS                  (+)Ostomy pink & viable. liquid & soft formed stool                  VAC intact w/ good seal, drains w/ serosanguinous drainage              :  (+)Ching              MSK/Vascular: Passive ROM x4,  no clubbing, cyanosis, nor edema                   RUE PICC dressing C/D/I w/o SOI   	Neuro: Awake and alert  	Skin: Warm, good turgor, without rashes      LABS/ CULTURES/ RADIOLOGY:              7.9    18.1  >-----------<  406      [07-23-19 @ 07:00]              26.1     135  |  104  |  32  ----------------------------<  158      [07-23-19 @ 07:00]  4.9   |  18  |  0.51        Ca     8.7     [07-23-19 @ 07:00]      Mg     1.9     [07-23-19 @ 07:00]      Phos  2.6     [07-23-19 @ 07:00]    CAPILLARY BLOOD GLUCOSE    POCT Blood Glucose.: 162 mg/dL (23 Jul 2019 13:45)  POCT Blood Glucose.: 194 mg/dL (23 Jul 2019 08:00)  POCT Blood Glucose.: 174 mg/dL (22 Jul 2019 22:42)  POCT Blood Glucose.: 171 mg/dL (22 Jul 2019 20:12)    Prealbumin, Serum: 5 mg/dL (07-04-19 @ 22:45)    Triglycerides, Serum (07.16.19 @ 07:33)    Triglycerides, Serum: 175: Gross Hemolysis, results may be falsely decreased mg/dL    Culture - Body Fluid with Gram Stain (07.23.19 @ 13:26)    Gram Stain:   Numerous polymorphonuclear leukocytes seen per low power field  Numerous Gram Negative Rods seen per oil power field    Specimen Source: .Body Fluid

## 2019-07-23 NOTE — SWALLOW BEDSIDE ASSESSMENT ADULT - PHARYNGEAL PHASE
Delayed pharyngeal swallow Throat clear on 1/4 tsps/Delayed pharyngeal swallow/Throat clear post oral intake Delayed pharyngeal swallow/Cough post oral intake

## 2019-07-23 NOTE — PROGRESS NOTE ADULT - ASSESSMENT
A/P:74F admitted with abdominal pain and exam findings consistent with peritonitis secondary to acute diverticulitis at the splenic flexure with evidence of contained perforation now s/p Exploratory laparotomy, left hemicolectomy, end colostomy, and ABThera Vac placement and s/p from RTOR for abdominal washout and Abthera vac placement, off pressors. TPN started 7/5 for Protein-Calorie Malnutrition    - TPN formula @ goal: Carbohydrates: 210grams, Amino Acid: 120grams, 40g lipids decreased from 2000 to 1200 ml      will taper TPN as pt tolerating more of a diet- Dysphagia I DM  - Elevated WBC- fluid Cx and UA pending  - Strict Intake and Output.,   - Weights three times a week  - Monitor BMP, Mg, Ionized Ca, Phosphorus daily  - Pre-albumin weekly.  - Hyperglycemia- will increase insulin from 55U to 60U today.  Continue sliding scale coverage.   - Hyperkalemia - KCl decreased from 100 meq to 60 meq today.  - HyperNa-stabilized;  No NaAce or NaCl in TPN  - Anemia- pt is a Jehovah Witness- continue mngt per primary team, Hematology note appreciated  - Continue as per SICU/ Surgery d/w team, will follow with you    TPN team, pager [phone], spectra [phone]  D/w Adela Cross

## 2019-07-23 NOTE — PROGRESS NOTE ADULT - ATTENDING COMMENTS
seen and examined 07-23-19 @ 1350    ate half of lunch and still eating    soft / NT / ND  colostomy bag with air and stool  bilateral LE edema to ankles      sepsis from perforated diverticulitis with intraabdominal hypertension  7/2 - Lupe's / ABThera  7/3, 7/5, 7/9 - washout / ABThera  7/11- washout / abdominal closure with bridging biologic mesh    moderate protein calorie malnutrition  -decrease TPN to half goal  -plan to stop TPN tomorrow if she continues to tolerate adequate oral nutrition    type 2 DM  -half insulin 60 -> 30 units    hyponatremia with fluid overload  -continue NaCl 0 mEq and NaAc 0 mEq  -minimal free water

## 2019-07-23 NOTE — CHART NOTE - NSCHARTNOTEFT_GEN_A_CORE
Nutrition Follow Up Note    Patient seen for: nutrition/TPN follow up    Source: medical records, TPN Team rounds, family at bedside    Chart reviewed, events noted. 74F admitted with "perforated diverticulitis s/p exploratory laparotomy, left hemicolectomy, end colostomy, and ABThera Vac placement, s/p 3x RTOR for abdominal washout and abthera vac placement, abdomen closed with Strattice, 2 garcia drains placed (upper abdomen and pelvis), black vac placed over mesh."    Pt NPO as of . TPN initiated , currently tolerating at goal. Per swallow eval, diet advanced to DYS1HC on . TPN reduced by half on  in setting of good ostomy function and diet advancement.     Pt continues with minimal po intake in setting of lethargy, however family reports pt is more alert and hungry at night. Today's follow up speech and swallow evaluation was deferred due to lethargy, with recommendation to consider MBS.    Diet : DYS1NC, Consistent Carbohydrate diet with snack     Parenteral Nutrition: TPN (ordered ) infusing at 83ml/hr (120Gm amino acids, 210Gm dextrose, 50Gm SMOF lipids) to provide 1694cal/day (20cal/Kg and 1.4Gm protein/Kg per dosing wt 83.2Kg); with 10ml MVI, 3ml MTE-5. TPN to be reduced by half on .    Per IBW 52.3Kg, TPN meets 32cal/Kg and 2.3Gm protein/Kg.    Colostomy output: 550ml + flatus  VAC: 10ml  BASIM drain: 50ml    Daily Weight in k.6 (), Weight in k.6 (-), Weight in k.7 (-), Weight in k (-), Weight in k.4 (-), Weight in k.1 (-)    Drug Dosing Weight  Weight (kg): 83.2 (2019 11:20)  BMI (kg/m2): 32.5 (2019 11:20)    Pertinent Medications: MEDICATIONS  (STANDING):  ALBUTerol/ipratropium for Nebulization 3 milliLiter(s) Nebulizer every 6 hours  apixaban 5 milliGRAM(s) Oral every 12 hours  buDESOnide    Inhalation Suspension 0.5 milliGRAM(s) Inhalation every 12 hours  chlorhexidine 2% Cloths 1 Application(s) Topical daily  enalapril 10 milliGRAM(s) Oral every 12 hours  epoetin dejuan Injectable 83057 Unit(s) SubCutaneous <User Schedule>  fat emulsion (Fish Oil and Plant Based) 20% Infusion 12.5 mL/Hr (12.5 mL/Hr) IV Continuous <Continuous>  ferrous    sulfate 325 milliGRAM(s) Oral daily  furosemide    Tablet 40 milliGRAM(s) Oral every 24 hours  insulin lispro (HumaLOG) corrective regimen sliding scale   SubCutaneous three times a day before meals  insulin lispro (HumaLOG) corrective regimen sliding scale   SubCutaneous at bedtime  montelukast 10 milliGRAM(s) Oral every 24 hours  nystatin    Suspension 653483 Unit(s) Oral two times a day  pantoprazole    Tablet 40 milliGRAM(s) Oral two times a day  Parenteral Nutrition - Adult 1 Each (50 mL/Hr) TPN Continuous <Continuous>  Parenteral Nutrition - Adult 1 Each (33 mL/Hr) TPN Continuous <Continuous>  simvastatin 20 milliGRAM(s) Oral at bedtime  spironolactone 25 milliGRAM(s) Oral daily    MEDICATIONS  (PRN):  acetaminophen   Tablet .. 650 milliGRAM(s) Oral every 6 hours PRN Mild Pain (1 - 3)  sodium chloride 0.65% Nasal 1 Spray(s) Both Nostrils daily PRN Nasal Congestion    LABS:    @ 07:00: Sodium 135, Potassium 4.9, Chloride 104, Calcium 8.7, Magnesium 1.9, Phosphorus 2.6, BUN 32<H>, Creatinine 0.51, <H>, Hemoglobin 7.9<L>, Hematocrit 26.1<L>    POCT Blood Glucose.: 162 mg/dL (2019 13:45)  POCT Blood Glucose.: 194 mg/dL (2019 08:00)  POCT Blood Glucose.: 174 mg/dL (2019 22:42)  POCT Blood Glucose.: 171 mg/dL (2019 20:12)    Skin per nursing documentation: no pressure injuries noted  Edema: 1+ generalized, right/left ankle    Estimated Needs: based on dosing wt 83.2Kg with consideration for BMI>30; calories increased for extubation  1720-0134 mark/day (20-25cal/Kg)  116-133 Gm protein/day (1.4-1.6Gm/Kg)    Previous Nutrition Diagnosis: Inadequate Protein-Energy Intake  Nutrition Diagnosis is: ongoing, being addressed with TPN and diet advancement    New Nutrition Diagnosis: none     Interventions:     Recommend  1) TPN per TPN Team/Nutrition assessment; monitor ability to discontinue TPN as po intake improves.  2) Continue DYS1NC, Consistent Carbohydrate diet with snack; monitor ability to liberalize pending further swallow evaluations    Monitoring and Evaluation:     Continue to monitor nutritional provision and tolerance, weights, labs, skin integrity.    RD remains available upon request and will follow up per protocol.    Emilie Gaffney MS RD CDN [facility], Pager # [phone].

## 2019-07-23 NOTE — SWALLOW BEDSIDE ASSESSMENT ADULT - SLP GENERAL OBSERVATIONS
Pt encountered asleep in bed, easily aroused given minimal verbal cues. Pt AA&Ox3, however, lethargic and reported feeling "very sleepy". Pt's RN Ibeth stated that Pt tolerated breakfast meal without difficulty. Pt's upper dentures/partial lower dentures not currently present (possibly with daughter at home as per RN). As such, chewables not trialed at this time. Overall poor participation in trials at this time, suspected due to lethargy. Discussed importance of remaining awake/alert during meals. Plan for re-evaluation and discussion re: objective testing with improvement in overall participation.

## 2019-07-23 NOTE — SWALLOW BEDSIDE ASSESSMENT ADULT - SWALLOW EVAL: DIAGNOSIS
Pt continues to present with evidence of oropharyngeal dysphagia, superimposed upon reduced overall arousal/lethargy. Oral stage is marked by reduced stripping of bolus from spoon, mildly reduced AP transport of puree with suspected mild delay in swallow initiation, fair laryngeal elevation upon palpation. Immediate cough post thin liquids, trace/subtle throat clear post nectar thick liquids on 1/4 trials. No overt signs/symptoms of penetration/aspiration on puree textures and honey thickened liquids. Pt requested to sleep after minimal trials due to overall lethargy/deconditioning.

## 2019-07-23 NOTE — PROGRESS NOTE ADULT - SUBJECTIVE AND OBJECTIVE BOX
Interval Events:    Transferred to the floor from SICU  Restarted eliquis  Finished course of imipenem    S: Patient doing well, denies fevers, chills, nausea, emesis, chest pain, SOB.  Pain is well controlled.  Continuing to have otomy function    O: Vital Signs Last 24 Hrs  T(C): 36.7 (23 Jul 2019 04:56), Max: 37.3 (22 Jul 2019 11:21)  T(F): 98 (23 Jul 2019 04:56), Max: 99.1 (22 Jul 2019 11:21)  HR: 114 (23 Jul 2019 04:56) (98 - 117)  BP: 121/75 (23 Jul 2019 04:56) (117/59 - 131/79)  BP(mean): 83 (22 Jul 2019 10:00) (83 - 84)  RR: 18 (23 Jul 2019 04:56) (18 - 23)  SpO2: 96% (23 Jul 2019 04:56) (95% - 100%)      21 Jul 2019 07:01  -  22 Jul 2019 07:00  --------------------------------------------------------  IN:    fat emulsion (Fish Oil and Plant Based) 20% Infusion: 270.4 mL    Oral Fluid: 100 mL    Solution: 200 mL    Solution: 162.5 mL    TPN (Total Parenteral Nutrition): 1992 mL  Total IN: 2724.9 mL    OUT:    Colostomy: 500 mL    Drain: 5 mL    Voided: 2400 mL  Total OUT: 2905 mL    Total NET: -180.1 mL      22 Jul 2019 07:01  -  23 Jul 2019 04:57  --------------------------------------------------------  IN:    fat emulsion (Fish Oil and Plant Based) 20% Infusion: 228.8 mL    Oral Fluid: 260 mL    Solution: 187.5 mL    TPN (Total Parenteral Nutrition): 1577 mL  Total IN: 2253.3 mL    OUT:    Colostomy: 400 mL    Drain: 45 mL    VAC (Vacuum Assisted Closure) System: 10 mL    Voided: 2000 mL  Total OUT: 2455 mL    Total NET: -201.7 mL          Physical Exam:    Gen: Well-developed, well-nourished in no acute distress  Resp: Clear to auscultation bilaterally with no wheezes, rale, or rhonchi  CV: Regular rate and rhythm with no murmur, gallop, or rub  GI: Soft, non-tender, non-distended with normoactive bowel sounds.  No masses.  Vac in place on midline with good suction.  MSK: Moves all extremities equally  Skin: No rashes    Labs:    22 Jul 2019 01:53    132    |  102    |  27     ----------------------------<  142    4.0     |  20     |  0.50     Ca    8.0        22 Jul 2019 01:53  Phos  2.5       22 Jul 2019 01:53  Mg     1.6       22 Jul 2019 01:53        CAPILLARY BLOOD GLUCOSE      POCT Blood Glucose.: 174 mg/dL (22 Jul 2019 22:42)  POCT Blood Glucose.: 171 mg/dL (22 Jul 2019 20:12)  POCT Blood Glucose.: 152 mg/dL (22 Jul 2019 11:32)  POCT Blood Glucose.: 173 mg/dL (22 Jul 2019 07:56)      MEDICATIONS  (STANDING):  ALBUTerol/ipratropium for Nebulization 3 milliLiter(s) Nebulizer every 6 hours  apixaban 5 milliGRAM(s) Oral every 12 hours  buDESOnide    Inhalation Suspension 0.5 milliGRAM(s) Inhalation every 12 hours  chlorhexidine 2% Cloths 1 Application(s) Topical daily  enalapril 10 milliGRAM(s) Oral every 12 hours  fat emulsion (Fish Oil and Plant Based) 20% Infusion 20.8 mL/Hr (20.8 mL/Hr) IV Continuous <Continuous>  ferrous    sulfate 325 milliGRAM(s) Oral daily  furosemide    Tablet 40 milliGRAM(s) Oral every 24 hours  insulin lispro (HumaLOG) corrective regimen sliding scale   SubCutaneous three times a day before meals  insulin lispro (HumaLOG) corrective regimen sliding scale   SubCutaneous at bedtime  montelukast 10 milliGRAM(s) Oral every 24 hours  nystatin    Suspension 352971 Unit(s) Oral two times a day  pantoprazole    Tablet 40 milliGRAM(s) Oral two times a day  Parenteral Nutrition - Adult 1 Each (50 mL/Hr) TPN Continuous <Continuous>  simvastatin 20 milliGRAM(s) Oral at bedtime  spironolactone 25 milliGRAM(s) Oral daily    MEDICATIONS  (PRN):  acetaminophen   Tablet .. 650 milliGRAM(s) Oral every 6 hours PRN Mild Pain (1 - 3)  sodium chloride 0.65% Nasal 1 Spray(s) Both Nostrils daily PRN Nasal Congestion Interval Events:    Transferred to the floor from SICU  Restarted eliquis  Finished course of imipenem    S: Patient doing well, denies fevers, chills, nausea, emesis, chest pain, SOB.  Pain is well controlled.  Continuing to have otomy function    O: Vital Signs Last 24 Hrs  T(C): 36.7 (23 Jul 2019 04:56), Max: 37.3 (22 Jul 2019 11:21)  T(F): 98 (23 Jul 2019 04:56), Max: 99.1 (22 Jul 2019 11:21)  HR: 114 (23 Jul 2019 04:56) (98 - 117)  BP: 121/75 (23 Jul 2019 04:56) (117/59 - 131/79)  BP(mean): 83 (22 Jul 2019 10:00) (83 - 84)  RR: 18 (23 Jul 2019 04:56) (18 - 23)  SpO2: 96% (23 Jul 2019 04:56) (95% - 100%)      21 Jul 2019 07:01  -  22 Jul 2019 07:00  --------------------------------------------------------  IN:    fat emulsion (Fish Oil and Plant Based) 20% Infusion: 270.4 mL    Oral Fluid: 100 mL    Solution: 200 mL    Solution: 162.5 mL    TPN (Total Parenteral Nutrition): 1992 mL  Total IN: 2724.9 mL    OUT:    Colostomy: 500 mL    Drain: 5 mL    Voided: 2400 mL  Total OUT: 2905 mL    Total NET: -180.1 mL      22 Jul 2019 07:01  -  23 Jul 2019 04:57  --------------------------------------------------------  IN:    fat emulsion (Fish Oil and Plant Based) 20% Infusion: 228.8 mL    Oral Fluid: 260 mL    Solution: 187.5 mL    TPN (Total Parenteral Nutrition): 1577 mL  Total IN: 2253.3 mL    OUT:    Colostomy: 400 mL    Drain: 45 mL    VAC (Vacuum Assisted Closure) System: 10 mL    Voided: 2000 mL  Total OUT: 2455 mL    Total NET: -201.7 mL          Physical Exam:    Gen: Well-developed, well-nourished in no acute distress  Resp: Clear to auscultation bilaterally with no wheezes, rale, or rhonchi  CV: Regular rate and rhythm with no murmur, gallop, or rub  GI: Soft, non-tender, non-distended with normoactive bowel sounds.  No masses.  Vac in place on midline with good suction.  remained gurdeep drain in place with possibly purulent drainage.  MSK: Moves all extremities equally  Skin: No rashes    Labs:    22 Jul 2019 01:53    132    |  102    |  27     ----------------------------<  142    4.0     |  20     |  0.50     Ca    8.0        22 Jul 2019 01:53  Phos  2.5       22 Jul 2019 01:53  Mg     1.6       22 Jul 2019 01:53        CAPILLARY BLOOD GLUCOSE      POCT Blood Glucose.: 174 mg/dL (22 Jul 2019 22:42)  POCT Blood Glucose.: 171 mg/dL (22 Jul 2019 20:12)  POCT Blood Glucose.: 152 mg/dL (22 Jul 2019 11:32)  POCT Blood Glucose.: 173 mg/dL (22 Jul 2019 07:56)      MEDICATIONS  (STANDING):  ALBUTerol/ipratropium for Nebulization 3 milliLiter(s) Nebulizer every 6 hours  apixaban 5 milliGRAM(s) Oral every 12 hours  buDESOnide    Inhalation Suspension 0.5 milliGRAM(s) Inhalation every 12 hours  chlorhexidine 2% Cloths 1 Application(s) Topical daily  enalapril 10 milliGRAM(s) Oral every 12 hours  fat emulsion (Fish Oil and Plant Based) 20% Infusion 20.8 mL/Hr (20.8 mL/Hr) IV Continuous <Continuous>  ferrous    sulfate 325 milliGRAM(s) Oral daily  furosemide    Tablet 40 milliGRAM(s) Oral every 24 hours  insulin lispro (HumaLOG) corrective regimen sliding scale   SubCutaneous three times a day before meals  insulin lispro (HumaLOG) corrective regimen sliding scale   SubCutaneous at bedtime  montelukast 10 milliGRAM(s) Oral every 24 hours  nystatin    Suspension 993618 Unit(s) Oral two times a day  pantoprazole    Tablet 40 milliGRAM(s) Oral two times a day  Parenteral Nutrition - Adult 1 Each (50 mL/Hr) TPN Continuous <Continuous>  simvastatin 20 milliGRAM(s) Oral at bedtime  spironolactone 25 milliGRAM(s) Oral daily    MEDICATIONS  (PRN):  acetaminophen   Tablet .. 650 milliGRAM(s) Oral every 6 hours PRN Mild Pain (1 - 3)  sodium chloride 0.65% Nasal 1 Spray(s) Both Nostrils daily PRN Nasal Congestion

## 2019-07-23 NOTE — PROGRESS NOTE ADULT - ASSESSMENT
Assessment:    ANDRE BARKER is a 74y Female with PMH of asthma, A fib, HFrEF on POD 17 from L hemicolectomy and end colostomy with VAC placement along with multiple washouts and closure with bridging mesh. Clinical condition improving.  Transferred to floor yesterday.    Plan:    Abx: impenem/cilastin finished on 7/22  Diet: Dysphagia I  continue procrit  Continue aggressive chest PT  Eliquis restarted      Plastics to perform graft for definitive closure of abd wound with granulation of tissue in 1-3 weeks

## 2019-07-24 LAB
ALBUMIN SERPL ELPH-MCNC: 2.6 G/DL — LOW (ref 3.3–5)
ALP SERPL-CCNC: 141 U/L — HIGH (ref 40–120)
ALT FLD-CCNC: 24 U/L — SIGNIFICANT CHANGE UP (ref 10–45)
ANION GAP SERPL CALC-SCNC: 12 MMOL/L — SIGNIFICANT CHANGE UP (ref 5–17)
ANISOCYTOSIS BLD QL: SLIGHT — SIGNIFICANT CHANGE UP
AST SERPL-CCNC: 29 U/L — SIGNIFICANT CHANGE UP (ref 10–40)
BASOPHILS # BLD AUTO: 0 K/UL — SIGNIFICANT CHANGE UP (ref 0–0.2)
BASOPHILS NFR BLD AUTO: 0 % — SIGNIFICANT CHANGE UP (ref 0–2)
BILIRUB DIRECT SERPL-MCNC: <0.1 MG/DL — SIGNIFICANT CHANGE UP (ref 0–0.2)
BILIRUB SERPL-MCNC: 0.3 MG/DL — SIGNIFICANT CHANGE UP (ref 0.2–1.2)
BUN SERPL-MCNC: 25 MG/DL — HIGH (ref 7–23)
CA-I BLD-SCNC: 1.19 MMOL/L — SIGNIFICANT CHANGE UP (ref 1.12–1.3)
CALCIUM SERPL-MCNC: 8.6 MG/DL — SIGNIFICANT CHANGE UP (ref 8.4–10.5)
CHLORIDE SERPL-SCNC: 101 MMOL/L — SIGNIFICANT CHANGE UP (ref 96–108)
CO2 SERPL-SCNC: 22 MMOL/L — SIGNIFICANT CHANGE UP (ref 22–31)
CREAT SERPL-MCNC: 0.51 MG/DL — SIGNIFICANT CHANGE UP (ref 0.5–1.3)
DACRYOCYTES BLD QL SMEAR: SLIGHT — SIGNIFICANT CHANGE UP
EOSINOPHIL # BLD AUTO: 0.1 K/UL — SIGNIFICANT CHANGE UP (ref 0–0.5)
EOSINOPHIL NFR BLD AUTO: 1 % — SIGNIFICANT CHANGE UP (ref 0–6)
GLUCOSE BLDC GLUCOMTR-MCNC: 117 MG/DL — HIGH (ref 70–99)
GLUCOSE BLDC GLUCOMTR-MCNC: 159 MG/DL — HIGH (ref 70–99)
GLUCOSE BLDC GLUCOMTR-MCNC: 194 MG/DL — HIGH (ref 70–99)
GLUCOSE BLDC GLUCOMTR-MCNC: 211 MG/DL — HIGH (ref 70–99)
GLUCOSE SERPL-MCNC: 201 MG/DL — HIGH (ref 70–99)
HCT VFR BLD CALC: 26 % — LOW (ref 34.5–45)
HGB BLD-MCNC: 7.9 G/DL — LOW (ref 11.5–15.5)
HYPOCHROMIA BLD QL: SLIGHT — SIGNIFICANT CHANGE UP
LYMPHOCYTES # BLD AUTO: 1.8 K/UL — SIGNIFICANT CHANGE UP (ref 1–3.3)
LYMPHOCYTES # BLD AUTO: 9 % — LOW (ref 13–44)
MACROCYTES BLD QL: SIGNIFICANT CHANGE UP
MAGNESIUM SERPL-MCNC: 1.6 MG/DL — SIGNIFICANT CHANGE UP (ref 1.6–2.6)
MCHC RBC-ENTMCNC: 30.5 GM/DL — LOW (ref 32–36)
MCHC RBC-ENTMCNC: 32.4 PG — SIGNIFICANT CHANGE UP (ref 27–34)
MCV RBC AUTO: 106 FL — HIGH (ref 80–100)
METAMYELOCYTES # FLD: 1 % — HIGH (ref 0–0)
MICROCYTES BLD QL: SLIGHT — SIGNIFICANT CHANGE UP
MONOCYTES # BLD AUTO: 1.8 K/UL — HIGH (ref 0–0.9)
MONOCYTES NFR BLD AUTO: 12 % — SIGNIFICANT CHANGE UP (ref 2–14)
MYELOCYTES NFR BLD: 4 % — HIGH (ref 0–0)
NEUTROPHILS # BLD AUTO: 11 K/UL — HIGH (ref 1.8–7.4)
NEUTROPHILS NFR BLD AUTO: 72 % — SIGNIFICANT CHANGE UP (ref 43–77)
NEUTS BAND # BLD: 1 % — SIGNIFICANT CHANGE UP (ref 0–8)
OVALOCYTES BLD QL SMEAR: SLIGHT — SIGNIFICANT CHANGE UP
PHOSPHATE SERPL-MCNC: 3.3 MG/DL — SIGNIFICANT CHANGE UP (ref 2.5–4.5)
PLAT MORPH BLD: NORMAL — SIGNIFICANT CHANGE UP
PLATELET # BLD AUTO: 420 K/UL — HIGH (ref 150–400)
POIKILOCYTOSIS BLD QL AUTO: SLIGHT — SIGNIFICANT CHANGE UP
POLYCHROMASIA BLD QL SMEAR: SLIGHT — SIGNIFICANT CHANGE UP
POTASSIUM SERPL-MCNC: 4.3 MMOL/L — SIGNIFICANT CHANGE UP (ref 3.5–5.3)
POTASSIUM SERPL-SCNC: 4.3 MMOL/L — SIGNIFICANT CHANGE UP (ref 3.5–5.3)
PREALB SERPL-MCNC: 12 MG/DL — LOW (ref 20–40)
PROT SERPL-MCNC: 7.5 G/DL — SIGNIFICANT CHANGE UP (ref 6–8.3)
RBC # BLD: 2.44 M/UL — LOW (ref 3.8–5.2)
RBC # FLD: 20.4 % — HIGH (ref 10.3–14.5)
RBC BLD AUTO: ABNORMAL
SODIUM SERPL-SCNC: 135 MMOL/L — SIGNIFICANT CHANGE UP (ref 135–145)
SPHEROCYTES BLD QL SMEAR: SLIGHT — SIGNIFICANT CHANGE UP
WBC # BLD: 14.7 K/UL — HIGH (ref 3.8–10.5)
WBC # FLD AUTO: 14.7 K/UL — HIGH (ref 3.8–10.5)

## 2019-07-24 PROCEDURE — 99233 SBSQ HOSP IP/OBS HIGH 50: CPT | Mod: GC

## 2019-07-24 PROCEDURE — 99232 SBSQ HOSP IP/OBS MODERATE 35: CPT

## 2019-07-24 RX ORDER — SODIUM CHLORIDE 9 MG/ML
1000 INJECTION, SOLUTION INTRAVENOUS
Refills: 0 | Status: DISCONTINUED | OUTPATIENT
Start: 2019-07-24 | End: 2019-07-29

## 2019-07-24 RX ORDER — DEXTROSE 50 % IN WATER 50 %
15 SYRINGE (ML) INTRAVENOUS ONCE
Refills: 0 | Status: DISCONTINUED | OUTPATIENT
Start: 2019-07-24 | End: 2019-08-02

## 2019-07-24 RX ORDER — GLUCAGON INJECTION, SOLUTION 0.5 MG/.1ML
1 INJECTION, SOLUTION SUBCUTANEOUS ONCE
Refills: 0 | Status: DISCONTINUED | OUTPATIENT
Start: 2019-07-24 | End: 2019-08-02

## 2019-07-24 RX ORDER — INSULIN GLARGINE 100 [IU]/ML
20 INJECTION, SOLUTION SUBCUTANEOUS AT BEDTIME
Refills: 0 | Status: DISCONTINUED | OUTPATIENT
Start: 2019-07-24 | End: 2019-08-02

## 2019-07-24 RX ORDER — DEXTROSE 50 % IN WATER 50 %
25 SYRINGE (ML) INTRAVENOUS ONCE
Refills: 0 | Status: DISCONTINUED | OUTPATIENT
Start: 2019-07-24 | End: 2019-08-02

## 2019-07-24 RX ORDER — DEXTROSE 50 % IN WATER 50 %
12.5 SYRINGE (ML) INTRAVENOUS ONCE
Refills: 0 | Status: DISCONTINUED | OUTPATIENT
Start: 2019-07-24 | End: 2019-08-02

## 2019-07-24 RX ORDER — INSULIN LISPRO 100/ML
VIAL (ML) SUBCUTANEOUS
Refills: 0 | Status: DISCONTINUED | OUTPATIENT
Start: 2019-07-24 | End: 2019-08-02

## 2019-07-24 RX ORDER — INSULIN LISPRO 100/ML
VIAL (ML) SUBCUTANEOUS AT BEDTIME
Refills: 0 | Status: DISCONTINUED | OUTPATIENT
Start: 2019-07-24 | End: 2019-08-02

## 2019-07-24 RX ADMIN — CHLORHEXIDINE GLUCONATE 1 APPLICATION(S): 213 SOLUTION TOPICAL at 09:18

## 2019-07-24 RX ADMIN — APIXABAN 5 MILLIGRAM(S): 2.5 TABLET, FILM COATED ORAL at 06:28

## 2019-07-24 RX ADMIN — Medication 0.5 MILLIGRAM(S): at 06:28

## 2019-07-24 RX ADMIN — Medication 10 MILLIGRAM(S): at 18:11

## 2019-07-24 RX ADMIN — Medication 4: at 18:11

## 2019-07-24 RX ADMIN — ERYTHROPOIETIN 20000 UNIT(S): 10000 INJECTION, SOLUTION INTRAVENOUS; SUBCUTANEOUS at 09:19

## 2019-07-24 RX ADMIN — Medication 650 MILLIGRAM(S): at 09:48

## 2019-07-24 RX ADMIN — Medication 3 MILLILITER(S): at 12:06

## 2019-07-24 RX ADMIN — SIMVASTATIN 20 MILLIGRAM(S): 20 TABLET, FILM COATED ORAL at 22:35

## 2019-07-24 RX ADMIN — Medication 40 MILLIGRAM(S): at 06:28

## 2019-07-24 RX ADMIN — Medication 0.5 MILLIGRAM(S): at 18:12

## 2019-07-24 RX ADMIN — MONTELUKAST 10 MILLIGRAM(S): 4 TABLET, CHEWABLE ORAL at 12:06

## 2019-07-24 RX ADMIN — PANTOPRAZOLE SODIUM 40 MILLIGRAM(S): 20 TABLET, DELAYED RELEASE ORAL at 18:11

## 2019-07-24 RX ADMIN — Medication 1 SPRAY(S): at 22:33

## 2019-07-24 RX ADMIN — Medication 325 MILLIGRAM(S): at 12:06

## 2019-07-24 RX ADMIN — Medication 3 MILLILITER(S): at 22:35

## 2019-07-24 RX ADMIN — PANTOPRAZOLE SODIUM 40 MILLIGRAM(S): 20 TABLET, DELAYED RELEASE ORAL at 06:28

## 2019-07-24 RX ADMIN — Medication 2: at 12:06

## 2019-07-24 RX ADMIN — Medication 500000 UNIT(S): at 18:11

## 2019-07-24 RX ADMIN — Medication 650 MILLIGRAM(S): at 09:18

## 2019-07-24 RX ADMIN — APIXABAN 5 MILLIGRAM(S): 2.5 TABLET, FILM COATED ORAL at 18:11

## 2019-07-24 RX ADMIN — Medication 3 MILLILITER(S): at 06:28

## 2019-07-24 RX ADMIN — SPIRONOLACTONE 25 MILLIGRAM(S): 25 TABLET, FILM COATED ORAL at 06:28

## 2019-07-24 RX ADMIN — Medication 2: at 10:29

## 2019-07-24 RX ADMIN — Medication 500000 UNIT(S): at 06:28

## 2019-07-24 RX ADMIN — Medication 3 MILLILITER(S): at 18:12

## 2019-07-24 RX ADMIN — Medication 10 MILLIGRAM(S): at 06:28

## 2019-07-24 NOTE — PROGRESS NOTE ADULT - ATTENDING COMMENTS
seen and examined 07-24-19 @ 1120    tolerating dysphagia diet    soft / NT / ND  colostomy bag currently empty  bilateral LE edema to ankles      sepsis from perforated diverticulitis with intraabdominal hypertension  7/2 - Lupe's / ABThera  7/3, 7/5, 7/9 - washout / ABThera  7/11- washout / abdominal closure with bridging biologic mesh    moderate protein calorie malnutrition  -stop TPN since she is tolerating oral nutrition    type 2 DM  -monitor finger sticks  -restart insulin (medication reconciliation lists glargine 20 units qhs) seen and examined 07-24-19 @ 1120    tolerating dysphagia diet    soft / NT / ND  colostomy bag currently empty  BASIM drain has scant creamy output  bilateral LE edema to ankles      sepsis from perforated diverticulitis with intraabdominal hypertension  7/2 - Lupe's / ABThera  7/3, 7/5, 7/9 - washout / ABThera  7/11- washout / abdominal closure with bridging biologic mesh    moderate protein calorie malnutrition  -stop TPN since she is tolerating oral nutrition    type 2 DM  -monitor finger sticks  -restart insulin (medication reconciliation lists glargine 20 units qhs)

## 2019-07-24 NOTE — PROGRESS NOTE ADULT - ASSESSMENT
A/P:74F admitted with abdominal pain and exam findings consistent with peritonitis secondary to acute diverticulitis at the splenic flexure with evidence of contained perforation now s/p Exploratory laparotomy, left hemicolectomy, end colostomy, and ABThera Vac placement and s/p from RTOR for abdominal washout and Abthera vac placement, off pressors. TPN started 7/5 for Protein-Calorie Malnutrition    - TPN formula @ goal: Carbohydrates: 210grams, Amino Acid: 120grams, 40g lipids decreased from 2000 to 1200 ml      will HOLD TPN as pt tolerating / eating more - Dysphagia I DM      ENCOURAGE PO- d/w daughter  - Elevated WBC improved- fluid Cx and UA pending      Continue to monitor BASIM output  - Strict Intake and Output.,   - Weights three times a week  - Monitor BMP, Mg, Ionized Ca, Phosphorus daily  - Pre-albumin weekly.  - Hyperglycemia- Continue FS w/ sliding scale coverage.   - Hyperkalemia -stabilized  - HyperNa- stabilized  - Anemia- pt is a Jehovah Witness- continue mngt per primary team, Hematology note appreciated  - Continue as per SICU/ Surgery d/w team, will follow with you    TPN team, pager [phone], spectra [phone]  D/w Adela Cross

## 2019-07-24 NOTE — PROGRESS NOTE ADULT - SUBJECTIVE AND OBJECTIVE BOX
Interval Events: WBC 18.1 from 13.9, UA negative, BASIM drain culture +for PMN's and gram negative rods    S: Patient doing well, denies fevers, chills, nausea, emesis, chest pain, SOB. Tolerating dysphagia diet (honey thick fluids). Improved ostomy function. Pain well controlled.     O: Vital Signs  T(C): 36.3 (07-24 @ 01:55), Max: 37.2 (07-23 @ 13:52)  HR: 110 (07-24 @ 01:55) (82 - 116)  BP: 138/76 (07-24 @ 01:55) (113/72 - 138/76)  RR: 18 (07-24 @ 01:55) (16 - 18)  SpO2: 98% (07-24 @ 01:55) (95% - 100%)  07-22-19 @ 07:01  -  07-23-19 @ 07:00  --------------------------------------------------------  IN: 2253.3 mL / OUT: 3010 mL / NET: -756.7 mL    07-23-19 @ 07:01  -  07-24-19 @ 04:48  --------------------------------------------------------  IN: 315.5 mL / OUT: 1270 mL / NET: -954.5 mL      General: alert and oriented, NAD  Resp: airway patent, respirations unlabored  CVS: regular rate and rhythm  Abdomen: soft, nontender, nondistended, abthera vac in place with good suction, stoma pink with stool in bag, BASIM in place with cloudy fluid in bulb  Extremities: no edema  Skin: warm, dry, appropriate color                          7.9    18.1  )-----------( 406      ( 23 Jul 2019 07:00 )             26.1   07-23    135  |  104  |  32<H>  ----------------------------<  158<H>  4.9   |  18<L>  |  0.51    Ca    8.7      23 Jul 2019 07:00  Phos  2.6     07-23  Mg     1.9     07-23

## 2019-07-24 NOTE — PROGRESS NOTE ADULT - SUBJECTIVE AND OBJECTIVE BOX
Patient seen and examined at bedside.    Overnight Events: Feels "not too bad" this am. Leukocytosis improved.      Current Meds:  acetaminophen   Tablet .. 650 milliGRAM(s) Oral every 6 hours PRN  ALBUTerol/ipratropium for Nebulization 3 milliLiter(s) Nebulizer every 6 hours  apixaban 5 milliGRAM(s) Oral every 12 hours  buDESOnide    Inhalation Suspension 0.5 milliGRAM(s) Inhalation every 12 hours  chlorhexidine 2% Cloths 1 Application(s) Topical daily  enalapril 10 milliGRAM(s) Oral every 12 hours  epoetin dejuan Injectable 68286 Unit(s) SubCutaneous <User Schedule>  fat emulsion (Fish Oil and Plant Based) 20% Infusion 12.5 mL/Hr IV Continuous <Continuous>  ferrous    sulfate 325 milliGRAM(s) Oral daily  furosemide    Tablet 40 milliGRAM(s) Oral every 24 hours  insulin lispro (HumaLOG) corrective regimen sliding scale   SubCutaneous three times a day before meals  insulin lispro (HumaLOG) corrective regimen sliding scale   SubCutaneous at bedtime  montelukast 10 milliGRAM(s) Oral every 24 hours  nystatin    Suspension 198230 Unit(s) Oral two times a day  pantoprazole    Tablet 40 milliGRAM(s) Oral two times a day  Parenteral Nutrition - Adult 1 Each TPN Continuous <Continuous>  simvastatin 20 milliGRAM(s) Oral at bedtime  sodium chloride 0.65% Nasal 1 Spray(s) Both Nostrils daily PRN  spironolactone 25 milliGRAM(s) Oral daily      Vitals:  T(F): 98.1 (07-24), Max: 98.9 (07-23)  HR: 109 (07-24) (82 - 116)  BP: 119/73 (07-24) (113/72 - 138/76)  RR: 18 (07-24)  SpO2: 97% (07-24)  I&O's Summary    23 Jul 2019 07:01  -  24 Jul 2019 07:00  --------------------------------------------------------  IN: 1261.5 mL / OUT: 2050 mL / NET: -788.5 mL      Physical Exam:  Appearance: No acute distress; sleeping comfortably  HEENT:  EOMI, sclera anicteric, Normal oral mucosa  Cardiovascular: RRR, S1, S2, no murmurs, rubs, or gallops; no edema; JVD to mandible  Respiratory: Coarse breath sounds bilaterally  Gastrointestinal: soft, Right sided wound vac, BASIM drain x 1  Musculoskeletal: No clubbing; no joint deformity   Extremities: 1+ edema  Neurologic: Non-focal  Skin: No rashes, ecchymoses, or cyanosis                            7.9    14.7  )-----------( 420      ( 24 Jul 2019 11:23 )             26.0     07-24    135  |  101  |  25<H>  ----------------------------<  201<H>  4.3   |  22  |  0.51    Ca    8.6      24 Jul 2019 11:23  Phos  3.3     07-24  Mg     1.6     07-24    TPro  7.5  /  Alb  2.6<L>  /  TBili  0.3  /  DBili  <0.1  /  AST  29  /  ALT  24  /  AlkPhos  141<H>  07-24      CARDIAC MARKERS ( 19 Jul 2019 03:53 )  42 ng/L / x     / x     / x     / x     / x              Echo:  7/3/19  Conclusions:  1. Mitral annular calcification. Peak mitral valve gradient  equals 9 mm Hg, mean transmitral valve gradient equals 5 mm  Hg, consistent with moderate mitral stenosis.  2. Aortic valve not well visualized; appears calcified.  Peak transaortic valve gradient equals 25 mm Hg, mean  transaortic valve gradient equals 12 mm Hg, aortic valve  velocity time integral equals 34 cm, consistent with mild  aortic stenosis.  3. Moderate segmental left ventricular systolic  dysfunction. Difficult to assess LV function. Not all  myocardial segments well visualized. Endocardial  visualization enhanced with intravenous injection of  Ultrasonic Enhancing Agent (Definity). The apex appears  aneurysmal. No obvious left ventricular thrombus.  4. The right ventricle is not well visualized.A device  wire is noted in the right heart.      Cath:  3/8/18  CORONARY VESSELS: The coronary circulation is right dominant.  LM:   --  LM: Normal.  LAD:   --  LAD: Normal.  CX:   --  Circumflex: Normal.  RCA:   --  RCA: Normal.  COMPLICATIONS: There were no complications.  DIAGNOSTIC RECOMMENDATIONS: The patient should continue with the present  medications.

## 2019-07-24 NOTE — PROGRESS NOTE ADULT - ASSESSMENT
74F with PMH of asthma, A fib, HFrEF here with perforated diverticulitis s/p L hemicolectomy and end colostomy with VAC placement along with multiple washouts and closure with bridging mesh. Clinical condition improving, however WBC uptrending 18.1 from 13.9 previously, remains afebrile, UA negative, BASIM bulb gram stain showing gram negative rods, will follow culture.     Plan:    Abx: impenem/cilastin finished on 7/22  Diet: Dysphagia I  continue procrit  Continue aggressive chest PT  Eliquis restarted  Wound vac change today  F/U drain culture    Plastics to perform graft for definitive closure of abd wound with granulation of tissue in 1-3 weeks    Green [id number]

## 2019-07-24 NOTE — PROGRESS NOTE ADULT - SUBJECTIVE AND OBJECTIVE BOX
[facility] NUTRITION SUPPORT / TPN -- FOLLOW UP NOTE  --------------------------------------------------------------------------------    24 hour events/subjective:  - Pt more awake/ alert  - Tolerating dysphagia diet, eating more- daughter wants to bring in food favorites to help encourage  - No acute events overnight  - more alert later in the day, no n/v/d  - no f/c/s  - no cp/ palp/ sob  - Pt on tele on 9monti  - Encouraged eating and oob to chair  - PT to follow- pt for MATT d/c      Diet:  Diet, Dysphagia 1 Pureed-Honey Consistency Fluid:   Consistent Carbohydrate Evening Snack (CSTCHOSN) (07-22-19 @ 09:49)      Appetite: [  ]Poor [ x ]Adequate [  ]Good  Caloric intake:  [  x ]  Adequate   [   ] Inadequate    ROS: General/ GI see HPI  all other systems negative      ALLERGIES & MEDICATIONS  --------------------------------------------------------------------------------  ALLERGIES  Coreg (Other)  digoxin (Other; Short breath (Mild to Mod))  penicillins (Hives)    Intolerances  metoprolol (Other)  Solu-Medrol (Other (Mild to Mod))      STANDING INPATIENT MEDICATIONS    ALBUTerol/ipratropium for Nebulization 3 milliLiter(s) Nebulizer every 6 hours  apixaban 5 milliGRAM(s) Oral every 12 hours  buDESOnide    Inhalation Suspension 0.5 milliGRAM(s) Inhalation every 12 hours  chlorhexidine 2% Cloths 1 Application(s) Topical daily  enalapril 10 milliGRAM(s) Oral every 12 hours  epoetin dejuan Injectable 56535 Unit(s) SubCutaneous <User Schedule>  fat emulsion (Fish Oil and Plant Based) 20% Infusion 12.5 mL/Hr IV Continuous <Continuous>  ferrous    sulfate 325 milliGRAM(s) Oral daily  furosemide    Tablet 40 milliGRAM(s) Oral every 24 hours  insulin lispro (HumaLOG) corrective regimen sliding scale   SubCutaneous three times a day before meals  insulin lispro (HumaLOG) corrective regimen sliding scale   SubCutaneous at bedtime  montelukast 10 milliGRAM(s) Oral every 24 hours  nystatin    Suspension 435195 Unit(s) Oral two times a day  pantoprazole    Tablet 40 milliGRAM(s) Oral two times a day  Parenteral Nutrition - Adult 1 Each TPN Continuous <Continuous>  simvastatin 20 milliGRAM(s) Oral at bedtime  spironolactone 25 milliGRAM(s) Oral daily      PRN INPATIENT MEDICATION  acetaminophen   Tablet .. 650 milliGRAM(s) Oral every 6 hours PRN  sodium chloride 0.65% Nasal 1 Spray(s) Both Nostrils daily PRN        VITALS/PHYSICAL EXAM  --------------------------------------------------------------------------------  T(C): 36.7 (07-24-19 @ 09:23), Max: 37.2 (07-23-19 @ 13:52)  HR: 109 (07-24-19 @ 09:23) (82 - 116)  BP: 119/73 (07-24-19 @ 09:23) (113/72 - 138/76)  RR: 18 (07-24-19 @ 09:23) (17 - 18)  SpO2: 97% (07-24-19 @ 09:23) (95% - 100%)  Wt(kg): --        07-23-19 @ 07:01  -  07-24-19 @ 07:00  --------------------------------------------------------  IN: 1261.5 mL / OUT: 2050 mL / NET: -788.5 mL      Physical Exam:    	Gen: NAD, NGT in place, A&Ox3  	HEENT: NC/AT, PERRL, mucosa moist, supple neck, clear oropharynx  	Chest: clear  	GI: +softly distended, nontender, no BS                  (+)Ostomy pink & viable. liquid & soft formed stool                  VAC intact w/ good seal, drains w/ cloudy drainage              :  (+)Ching              MSK/Vascular: Passive ROM x4,  no clubbing, cyanosis, nor edema                   RUE PICC dressing C/D/I w/o SOI   	Neuro: Awake and alert  	Skin: Warm, good turgor, without rashes      LABS/ CULTURES/ RADIOLOGY:              7.9    14.7  >-----------<  420      [07-24-19 @ 11:23]              26.0     135  |  101  |  25  ----------------------------<  201      [07-24-19 @ 11:23]  4.3   |  22  |  0.51        Ca     8.6     [07-24-19 @ 11:23]      iCa    1.19     [07-24 @ 11:37]      Mg     1.6     [07-24-19 @ 11:23]      Phos  3.3     [07-24-19 @ 11:23]    TPro  7.5  /  Alb  2.6  /  TBili  0.3  /  DBili  <0.1  /  AST  29  /  ALT  24  /  AlkPhos  141  [07-24-19 @ 11:23]    CAPILLARY BLOOD GLUCOSE  POCT Blood Glucose.: 159 mg/dL (24 Jul 2019 12:05)  POCT Blood Glucose.: 194 mg/dL (24 Jul 2019 10:15)  POCT Blood Glucose.: 166 mg/dL (23 Jul 2019 22:10)  POCT Blood Glucose.: 141 mg/dL (23 Jul 2019 18:42)  POCT Blood Glucose.: 152 mg/dL (23 Jul 2019 15:35)  POCT Blood Glucose.: 162 mg/dL (23 Jul 2019 13:45)    Prealbumin, Serum: 5 mg/dL (07-04-19 @ 22:45)

## 2019-07-24 NOTE — PROGRESS NOTE ADULT - PROBLEM SELECTOR PLAN 2
- resolved, now off pressors  - Completed course of abx, ID following  - BCx 7/10 NGTD. - resolved, now off pressors  - Completed course of abx, ID following  - BCx 7/10 NGTD

## 2019-07-24 NOTE — PROGRESS NOTE ADULT - ASSESSMENT
75 yo F with a PMHx significant for HFrEF (EF 35% now improved to 45%) s/p CRT-D, asthma (not on BB d/t severity), HTN, HLD, AF on eliquis, who p/w severe, sudden onset L sided abd pain, found to have proximal descending colon/splenic flexure acute diverticulitis with evidence of contained perforation, now s/p exploratory laparotomy, left hemicolectomy, end colostomy, and ABThera VAC placement on 7/1. Hospital course complicated by septic shock and abdominal washout x3 (last 7/9). She returned to OR 7/11 for abdominal closure and wound vac placement. She was extubated 7/12, now on PO medications and TPN.

## 2019-07-25 LAB
-  AMIKACIN: SIGNIFICANT CHANGE UP
-  AMIKACIN: SIGNIFICANT CHANGE UP
-  AMPICILLIN/SULBACTAM: SIGNIFICANT CHANGE UP
-  AMPICILLIN/SULBACTAM: SIGNIFICANT CHANGE UP
-  AMPICILLIN: SIGNIFICANT CHANGE UP
-  AMPICILLIN: SIGNIFICANT CHANGE UP
-  AZTREONAM: SIGNIFICANT CHANGE UP
-  AZTREONAM: SIGNIFICANT CHANGE UP
-  CEFAZOLIN: SIGNIFICANT CHANGE UP
-  CEFEPIME: SIGNIFICANT CHANGE UP
-  CEFEPIME: SIGNIFICANT CHANGE UP
-  CEFOXITIN: SIGNIFICANT CHANGE UP
-  CEFOXITIN: SIGNIFICANT CHANGE UP
-  CEFTRIAXONE: SIGNIFICANT CHANGE UP
-  CEFTRIAXONE: SIGNIFICANT CHANGE UP
-  CIPROFLOXACIN: SIGNIFICANT CHANGE UP
-  CIPROFLOXACIN: SIGNIFICANT CHANGE UP
-  ERTAPENEM: SIGNIFICANT CHANGE UP
-  ERTAPENEM: SIGNIFICANT CHANGE UP
-  GENTAMICIN: SIGNIFICANT CHANGE UP
-  GENTAMICIN: SIGNIFICANT CHANGE UP
-  IMIPENEM: SIGNIFICANT CHANGE UP
-  IMIPENEM: SIGNIFICANT CHANGE UP
-  LEVOFLOXACIN: SIGNIFICANT CHANGE UP
-  LEVOFLOXACIN: SIGNIFICANT CHANGE UP
-  MEROPENEM: SIGNIFICANT CHANGE UP
-  MEROPENEM: SIGNIFICANT CHANGE UP
-  PIPERACILLIN/TAZOBACTAM: SIGNIFICANT CHANGE UP
-  PIPERACILLIN/TAZOBACTAM: SIGNIFICANT CHANGE UP
-  TOBRAMYCIN: SIGNIFICANT CHANGE UP
-  TOBRAMYCIN: SIGNIFICANT CHANGE UP
-  TRIMETHOPRIM/SULFAMETHOXAZOLE: SIGNIFICANT CHANGE UP
-  TRIMETHOPRIM/SULFAMETHOXAZOLE: SIGNIFICANT CHANGE UP
CULTURE RESULTS: NO GROWTH — SIGNIFICANT CHANGE UP
GLUCOSE BLDC GLUCOMTR-MCNC: 146 MG/DL — HIGH (ref 70–99)
GLUCOSE BLDC GLUCOMTR-MCNC: 159 MG/DL — HIGH (ref 70–99)
GLUCOSE BLDC GLUCOMTR-MCNC: 165 MG/DL — HIGH (ref 70–99)
GLUCOSE BLDC GLUCOMTR-MCNC: 169 MG/DL — HIGH (ref 70–99)
GLUCOSE BLDC GLUCOMTR-MCNC: 185 MG/DL — HIGH (ref 70–99)
METHOD TYPE: SIGNIFICANT CHANGE UP
METHOD TYPE: SIGNIFICANT CHANGE UP
SPECIMEN SOURCE: SIGNIFICANT CHANGE UP

## 2019-07-25 PROCEDURE — 93010 ELECTROCARDIOGRAM REPORT: CPT

## 2019-07-25 PROCEDURE — 99232 SBSQ HOSP IP/OBS MODERATE 35: CPT

## 2019-07-25 RX ADMIN — Medication 10 MILLIGRAM(S): at 17:19

## 2019-07-25 RX ADMIN — SPIRONOLACTONE 25 MILLIGRAM(S): 25 TABLET, FILM COATED ORAL at 05:17

## 2019-07-25 RX ADMIN — MONTELUKAST 10 MILLIGRAM(S): 4 TABLET, CHEWABLE ORAL at 12:34

## 2019-07-25 RX ADMIN — Medication 650 MILLIGRAM(S): at 20:42

## 2019-07-25 RX ADMIN — PANTOPRAZOLE SODIUM 40 MILLIGRAM(S): 20 TABLET, DELAYED RELEASE ORAL at 17:19

## 2019-07-25 RX ADMIN — Medication 40 MILLIGRAM(S): at 05:16

## 2019-07-25 RX ADMIN — Medication 2: at 12:34

## 2019-07-25 RX ADMIN — APIXABAN 5 MILLIGRAM(S): 2.5 TABLET, FILM COATED ORAL at 17:20

## 2019-07-25 RX ADMIN — Medication 3 MILLILITER(S): at 17:19

## 2019-07-25 RX ADMIN — Medication 10 MILLIGRAM(S): at 05:16

## 2019-07-25 RX ADMIN — INSULIN GLARGINE 20 UNIT(S): 100 INJECTION, SOLUTION SUBCUTANEOUS at 22:20

## 2019-07-25 RX ADMIN — Medication 2: at 08:45

## 2019-07-25 RX ADMIN — Medication 3 MILLILITER(S): at 12:35

## 2019-07-25 RX ADMIN — Medication 325 MILLIGRAM(S): at 12:34

## 2019-07-25 RX ADMIN — Medication 500000 UNIT(S): at 17:19

## 2019-07-25 RX ADMIN — PANTOPRAZOLE SODIUM 40 MILLIGRAM(S): 20 TABLET, DELAYED RELEASE ORAL at 05:18

## 2019-07-25 RX ADMIN — Medication 0.5 MILLIGRAM(S): at 17:19

## 2019-07-25 RX ADMIN — Medication 500000 UNIT(S): at 05:16

## 2019-07-25 RX ADMIN — Medication 3 MILLILITER(S): at 05:16

## 2019-07-25 RX ADMIN — SIMVASTATIN 20 MILLIGRAM(S): 20 TABLET, FILM COATED ORAL at 22:20

## 2019-07-25 RX ADMIN — APIXABAN 5 MILLIGRAM(S): 2.5 TABLET, FILM COATED ORAL at 05:16

## 2019-07-25 RX ADMIN — CHLORHEXIDINE GLUCONATE 1 APPLICATION(S): 213 SOLUTION TOPICAL at 08:45

## 2019-07-25 RX ADMIN — Medication 650 MILLIGRAM(S): at 20:12

## 2019-07-25 RX ADMIN — Medication 2: at 17:19

## 2019-07-25 RX ADMIN — Medication 0.5 MILLIGRAM(S): at 05:16

## 2019-07-25 NOTE — PROGRESS NOTE ADULT - SUBJECTIVE AND OBJECTIVE BOX
[facility] NUTRITION SUPPORT / TPN -- FOLLOW UP NOTE  --------------------------------------------------------------------------------    24 hour events/subjective:  - Pt more awake/ alert  - Tolerating dysphagia diet, eating more- daughter wants to bring in food favorites to help encourage  - No acute events overnight  - more alert later in the day, no n/v/d  - no f/c/s  - no cp/ palp/ sob  - Pt on tele on 9monti  - Encouraged eating and oob to chair       Encourage ambulation and increased mobility/ pt being offloaded  - PT to follow- pt for MATT d/c      Diet:  Diet, Dysphagia 1 Pureed-Honey Consistency Fluid:   Consistent Carbohydrate Evening Snack (CSTCHOSN) (07-22-19 @ 09:49)      Appetite: [ ]Poor [x  ]Adequate [  ]Good  Caloric intake:  [ x  ]  Adequate   [   ] Inadequate    ROS: General/ GI see HPI  all other systems negative      ALLERGIES & MEDICATIONS  --------------------------------------------------------------------------------  ALLERGIES  Coreg (Other)  digoxin (Other; Short breath (Mild to Mod))  penicillins (Hives)    INTOLERANCES  metoprolol (Other)  Solu-Medrol (Other (Mild to Mod))      STANDING INPATIENT MEDICATIONS    ALBUTerol/ipratropium for Nebulization 3 milliLiter(s) Nebulizer every 6 hours  apixaban 5 milliGRAM(s) Oral every 12 hours  buDESOnide    Inhalation Suspension 0.5 milliGRAM(s) Inhalation every 12 hours  chlorhexidine 2% Cloths 1 Application(s) Topical daily  dextrose 5%. 1000 milliLiter(s) IV Continuous <Continuous>  dextrose 50% Injectable 12.5 Gram(s) IV Push once  dextrose 50% Injectable 25 Gram(s) IV Push once  dextrose 50% Injectable 25 Gram(s) IV Push once  enalapril 10 milliGRAM(s) Oral every 12 hours  epoetin dejuan Injectable 57663 Unit(s) SubCutaneous <User Schedule>  ferrous    sulfate 325 milliGRAM(s) Oral daily  furosemide    Tablet 40 milliGRAM(s) Oral every 24 hours  insulin glargine Injectable (LANTUS) 20 Unit(s) SubCutaneous at bedtime  insulin lispro (HumaLOG) corrective regimen sliding scale   SubCutaneous three times a day before meals  insulin lispro (HumaLOG) corrective regimen sliding scale   SubCutaneous at bedtime  montelukast 10 milliGRAM(s) Oral every 24 hours  nystatin    Suspension 149787 Unit(s) Oral two times a day  pantoprazole    Tablet 40 milliGRAM(s) Oral two times a day  simvastatin 20 milliGRAM(s) Oral at bedtime  spironolactone 25 milliGRAM(s) Oral daily      PRN INPATIENT MEDICATION  acetaminophen   Tablet .. 650 milliGRAM(s) Oral every 6 hours PRN  dextrose 40% Gel 15 Gram(s) Oral once PRN  glucagon  Injectable 1 milliGRAM(s) IntraMuscular once PRN  sodium chloride 0.65% Nasal 1 Spray(s) Both Nostrils daily PRN        VITALS/PHYSICAL EXAM  --------------------------------------------------------------------------------  T(C): 36.5 (07-25-19 @ 10:54), Max: 36.7 (07-24-19 @ 13:47)  HR: 102 (07-25-19 @ 10:54) (102 - 110)  BP: 108/70 (07-25-19 @ 10:54) (103/66 - 136/77)  RR: 18 (07-25-19 @ 10:54) (16 - 18)  SpO2: 99% (07-25-19 @ 10:54) (96% - 99%)  Wt(kg): --        07-24-19 @ 07:01  -  07-25-19 @ 07:00  --------------------------------------------------------  IN: 662 mL / OUT: 1715 mL / NET: -1053 mL      Physical Exam:    	Gen: NAD, WD/ WN/ A&Ox3  	HEENT: NC/AT, PERRL, mucosa moist, supple neck, clear oropharynx  	Chest: clear  	GI: +softly distended, nontender, no BS                  (+)Ostomy pink & viable. liquid & soft formed stool                  VAC intact w/ good seal, drains w/ cloudy drainage              :  (+)Ching              MSK/Vascular: Passive ROM x4,  no clubbing, cyanosis, nor edema                   RUE PICC dressing C/D/I w/o SOI w/ no edema  	Neuro: Awake and alert  	Skin: Warm, good turgor, without rashes      LABS/ CULTURES/ RADIOLOGY:              7.9    14.7  >-----------<  420      [07-24-19 @ 11:23]              26.0     135  |  101  |  25  ----------------------------<  201      [07-24-19 @ 11:23]  4.3   |  22  |  0.51        Ca     8.6     [07-24-19 @ 11:23]      iCa    1.19     [07-24 @ 11:37]      Mg     1.6     [07-24-19 @ 11:23]      Phos  3.3     [07-24-19 @ 11:23]    TPro  7.5  /  Alb  2.6  /  TBili  0.3  /  DBili  <0.1  /  AST  29  /  ALT  24  /  AlkPhos  141  [07-24-19 @ 11:23]    CAPILLARY BLOOD GLUCOSE  POCT Blood Glucose.: 159 mg/dL (25 Jul 2019 12:14)  POCT Blood Glucose.: 185 mg/dL (25 Jul 2019 08:36)  POCT Blood Glucose.: 117 mg/dL (24 Jul 2019 21:29)  POCT Blood Glucose.: 211 mg/dL (24 Jul 2019 17:17)    Prealbumin, Serum: 12 mg/dL (07-24-19 @ 14:57)  Prealbumin, Serum: 5 mg/dL (07-04-19 @ 22:45)    Culture - Urine (07.24.19 @ 01:06)    Specimen Source: .Urine    Culture Results:   No growth    Culture - Body Fluid with Gram Stain (07.23.19 @ 13:26)    -  Ampicillin: R >16 These ampicillin results predict results for amoxicillin    -  Ampicillin: R >16 These ampicillin results predict results for amoxicillin    -  Ampicillin/Sulbactam: R >16/8 Enterobacter, Citrobacter, and Serratia may develop resistance during prolonged therapy (3-4 days)    -  Ampicillin/Sulbactam: R >16/8 Enterobacter, Citrobacter, and Serratia may develop resistance during prolonged therapy (3-4 days)    -  Amikacin: I 32    -  Amikacin: S <=16    Gram Stain:   Numerous polymorphonuclear leukocytes seen per low power field  Numerous Gram Negative Rods seen per oil power field    -  Levofloxacin: R >4    -  Meropenem: S <=1    -  Levofloxacin: R >4    -  Meropenem: S <=1    -  Aztreonam: S <=4    -  Ciprofloxacin: R >2    -  Ertapenem: S <=1    -  Imipenem: S <=1    -  Aztreonam: S <=4    -  Ciprofloxacin: R >2    -  Ertapenem: S <=1    -  Imipenem: S <=1    -  Tobramycin: R >8    -  Tobramycin: R >8    -  Cefazolin: I 16 Enterobacter, Citrobacter, and Serratia may develop resistance during prolonged therapy (3-4 days)    -  Cefepime: S <=4    -  Cefepime: S <=4    -  Gentamicin: S <=4    -  Gentamicin: S <=4    -  Ceftriaxone: S <=1 Enterobacter, Citrobacter, and Serratia may develop resistance during prolonged therapy    -  Ceftriaxone: S <=1 Enterobacter, Citrobacter, and Serratia may develop resistance during prolonged therapy    -  Cefoxitin: S <=8    -  Cefoxitin: S <=8    -  Trimethoprim/Sulfamethoxazole: R >2/38    -  Trimethoprim/Sulfamethoxazole: R >2/38    -  Piperacillin/Tazobactam: R >64    -  Piperacillin/Tazobactam: S <=16    Specimen Source: .Body Fluid    Culture Results:   Numerous Escherichia coli  Few Coag Negative Staphylococcus  Numerous Escherichia coli #2    Organism Identification: Escherichia coli  Escherichia coli    Organism: Escherichia coli    Organism: Escherichia coli    Method Type: COLLINS    Method Type: COLLINS

## 2019-07-25 NOTE — PROGRESS NOTE ADULT - ASSESSMENT
A/P: 73 yo Female with PMH of asthma, A fib, HFrEF with perforated diverticulitis s/p L hemicolectomy and end colostomy with VAC placement along with multiple washouts and closure with bridging mesh.     - 12 Lead EKG  - Continue Chest PT  - Continue PO diet  - F/U drain culture  - F/U Labs  - F/U with Plastics as outpatient for graft and definitive closure    [facility], [phone] A/P: 75 yo Female with PMH of asthma, A fib, HFrEF with perforated diverticulitis s/p L hemicolectomy and end colostomy with VAC placement along with multiple washouts and closure with bridging mesh.     - 12 Lead EKG  - Continue Chest PT  - Continue PO diet and f/u calorie count  - F/U drain culture  - F/U Labs  - F/U with Plastics as outpatient for graft and definitive closure    [facility], [phone]

## 2019-07-25 NOTE — PROGRESS NOTE ADULT - ATTENDING COMMENTS
Alert, conversant.  Acknowledges need to use supplements to achieve goal  1.  Protein calorie malnutrition, severe--trial of EN; do not d/c PICC.  Calorie count.  Trend nutritional parameters to assess adequacy of intake  2.  Hyperglycemia--on LA lantus which was tolerated at home  3.  Anemai--improving with SIOBHAN

## 2019-07-25 NOTE — PROGRESS NOTE ADULT - SUBJECTIVE AND OBJECTIVE BOX
Surgery Green Team Daily Progress Note     73 yo Female with PMH of asthma, A fib, HFrEF with perforated diverticulitis s/p L hemicolectomy and end colostomy with VAC placement along with multiple washouts and closure with bridging mesh.   --------------------------------------------------------------------------------------------------------------------  SUBJECTIVE / 24H EVENTS  Patient seen and examined on morning rounds.  On Telemetry, 5 wide complexes seen  Pt reports feeling fine  Diet transitioned from TPN to PO (dysphagia diet) yesterday  Improved ostomy function.   Pain well controlled.   WBC down to 14 (from 18)    OBJECTIVE:  VITAL SIGNS:  T(C): 36.5 (19 @ 05:18), Max: 36.7 (19 @ 09:23)  HR: 109 (19 @ 05:18) (103 - 110)  BP: 113/71 (19 @ 05:18) (103/66 - 136/77)  RR: 18 (19 @ 05:18) (16 - 18)  SpO2: 96% (19 @ 05:18) (96% - 97%)  Daily     Daily Weight in k.7 (2019 09:23)  POCT Blood Glucose.: 117 mg/dL (19 @ 21:29)  POCT Blood Glucose.: 211 mg/dL (19 @ 17:17)  POCT Blood Glucose.: 159 mg/dL (19 @ 12:05)      PHYSICAL EXAM:  Gen: NAD  LS: Respirations unlabored  GI: Soft, nontender, nondistended, (+) abthera vac in place, (+) stoma pink with stool in bag, (+) BASIM with SP output  Ext: Warm, well perfused      19 @ 07:01  -  07-24-19 @ 07:00  --------------------------------------------------------  IN:    fat emulsion (Fish Oil and Plant Based) 20% Infusion: 162.5 mL    Oral Fluid: 670 mL    TPN (Total Parenteral Nutrition): 429 mL  Total IN: 1261.5 mL    OUT:    Colostomy: 650 mL    Drain: 50 mL    Voided: 1350 mL  Total OUT: 2050 mL    Total NET: -788.5 mL      19 @ 07:01  -  19 @ 05:31  --------------------------------------------------------  IN:    fat emulsion (Fish Oil and Plant Based) 20% Infusion: 75 mL    Oral Fluid: 50 mL    TPN (Total Parenteral Nutrition): 297 mL  Total IN: 422 mL    OUT:    Colostomy: 50 mL    Drain: 40 mL    Voided: 1625 mL  Total OUT: 1715 mL    Total NET: -1293 mL          LAB VALUES:      135  |  101  |  25<H>  ----------------------------<  201<H>  4.3   |  22  |  0.51    Ca    8.6      2019 11:23  Phos  3.3       Mg     1.6         TPro  7.5  /  Alb  2.6<L>  /  TBili  0.3  /  DBili  <0.1  /  AST  29  /  ALT  24  /  AlkPhos  141<H>                                 7.9    14.7  )-----------( 420      ( 2019 11:23 )             26.0     LIVER FUNCTIONS - ( 2019 11:23 )  Alb: 2.6 g/dL / Pro: 7.5 g/dL / ALK PHOS: 141 U/L / ALT: 24 U/L / AST: 29 U/L / GGT: x                   Urinalysis Basic - ( 2019 22:25 )    Color: Light Yellow / Appearance: Clear / S.016 / pH: x  Gluc: x / Ketone: Negative  / Bili: Negative / Urobili: Negative   Blood: x / Protein: Trace / Nitrite: Negative   Leuk Esterase: Negative / RBC: 14 /hpf / WBC 3 /HPF   Sq Epi: x / Non Sq Epi: 1 /hpf / Bacteria: 0.0        MICROBIOLOGY:    Culture - Urine (collected 2019 01:06)  Source: .Urine  Final Report (2019 01:28):    No growth    Culture - Body Fluid with Gram Stain (collected 2019 13:26)  Source: .Body Fluid  Gram Stain (2019 14:33):    Numerous polymorphonuclear leukocytes seen per low power field    Numerous Gram Negative Rods seen per oil power field  Preliminary Report (2019 10:08):    Numerous Gram Negative Rods    Few Gram Positive Cocci in Clusters        RADIOLOGY:        MEDICATIONS  (STANDING):  ALBUTerol/ipratropium for Nebulization 3 milliLiter(s) Nebulizer every 6 hours  apixaban 5 milliGRAM(s) Oral every 12 hours  buDESOnide    Inhalation Suspension 0.5 milliGRAM(s) Inhalation every 12 hours  chlorhexidine 2% Cloths 1 Application(s) Topical daily  dextrose 5%. 1000 milliLiter(s) (50 mL/Hr) IV Continuous <Continuous>  dextrose 50% Injectable 12.5 Gram(s) IV Push once  dextrose 50% Injectable 25 Gram(s) IV Push once  dextrose 50% Injectable 25 Gram(s) IV Push once  enalapril 10 milliGRAM(s) Oral every 12 hours  epoetin dejuan Injectable 10500 Unit(s) SubCutaneous <User Schedule>  fat emulsion (Fish Oil and Plant Based) 20% Infusion 12.5 mL/Hr (12.5 mL/Hr) IV Continuous <Continuous>  ferrous    sulfate 325 milliGRAM(s) Oral daily  furosemide    Tablet 40 milliGRAM(s) Oral every 24 hours  insulin glargine Injectable (LANTUS) 20 Unit(s) SubCutaneous at bedtime  insulin lispro (HumaLOG) corrective regimen sliding scale   SubCutaneous three times a day before meals  insulin lispro (HumaLOG) corrective regimen sliding scale   SubCutaneous at bedtime  montelukast 10 milliGRAM(s) Oral every 24 hours  nystatin    Suspension 979211 Unit(s) Oral two times a day  pantoprazole    Tablet 40 milliGRAM(s) Oral two times a day  simvastatin 20 milliGRAM(s) Oral at bedtime  spironolactone 25 milliGRAM(s) Oral daily    MEDICATIONS  (PRN):  acetaminophen   Tablet .. 650 milliGRAM(s) Oral every 6 hours PRN Mild Pain (1 - 3)  dextrose 40% Gel 15 Gram(s) Oral once PRN Blood Glucose LESS THAN 70 milliGRAM(s)/deciliter  glucagon  Injectable 1 milliGRAM(s) IntraMuscular once PRN Glucose LESS THAN 70 milligrams/deciliter  sodium chloride 0.65% Nasal 1 Spray(s) Both Nostrils daily PRN Nasal Congestion Surgery Green Team Daily Progress Note     73 yo Female with PMH of asthma, A fib, HFrEF with perforated diverticulitis s/p L hemicolectomy and end colostomy with VAC placement along with multiple washouts and closure with bridging mesh.   --------------------------------------------------------------------------------------------------------------------  SUBJECTIVE / 24H EVENTS  Patient seen and examined on morning rounds.  On Telemetry, 5 wide complexes seen  Pt reports feeling fine  Tolerating dysphagia diet  Improved ostomy function.   Pain well controlled.   WBC down to 14 (from 18)    OBJECTIVE:  VITAL SIGNS:  T(C): 36.5 (19 @ 05:18), Max: 36.7 (19 @ 09:23)  HR: 109 (19 @ 05:18) (103 - 110)  BP: 113/71 (19 @ 05:18) (103/66 - 136/77)  RR: 18 (19 @ 05:18) (16 - 18)  SpO2: 96% (19 @ 05:18) (96% - 97%)  Daily     Daily Weight in k.7 (2019 09:23)  POCT Blood Glucose.: 117 mg/dL (19 @ 21:29)  POCT Blood Glucose.: 211 mg/dL (19 @ 17:17)  POCT Blood Glucose.: 159 mg/dL (19 @ 12:05)      PHYSICAL EXAM:  Gen: NAD  LS: Respirations unlabored  GI: Soft, nontender, nondistended, (+) abthera vac in place, (+) stoma pink with stool in bag, (+) BASIM with SP output  Ext: Warm, well perfused      19 @ 07:01  -  19 @ 07:00  --------------------------------------------------------  IN:    fat emulsion (Fish Oil and Plant Based) 20% Infusion: 162.5 mL    Oral Fluid: 670 mL    TPN (Total Parenteral Nutrition): 429 mL  Total IN: 1261.5 mL    OUT:    Colostomy: 650 mL    Drain: 50 mL    Voided: 1350 mL  Total OUT: 2050 mL    Total NET: -788.5 mL      19 @ 07:01  -  19 @ 05:31  --------------------------------------------------------  IN:    fat emulsion (Fish Oil and Plant Based) 20% Infusion: 75 mL    Oral Fluid: 50 mL    TPN (Total Parenteral Nutrition): 297 mL  Total IN: 422 mL    OUT:    Colostomy: 50 mL    Drain: 40 mL    Voided: 1625 mL  Total OUT: 1715 mL    Total NET: -1293 mL          LAB VALUES:      135  |  101  |  25<H>  ----------------------------<  201<H>  4.3   |  22  |  0.51    Ca    8.6      2019 11:23  Phos  3.3       Mg     1.6         TPro  7.5  /  Alb  2.6<L>  /  TBili  0.3  /  DBili  <0.1  /  AST  29  /  ALT  24  /  AlkPhos  141<H>                                 7.9    14.7  )-----------( 420      ( 2019 11:23 )             26.0     LIVER FUNCTIONS - ( 2019 11:23 )  Alb: 2.6 g/dL / Pro: 7.5 g/dL / ALK PHOS: 141 U/L / ALT: 24 U/L / AST: 29 U/L / GGT: x                   Urinalysis Basic - ( 2019 22:25 )    Color: Light Yellow / Appearance: Clear / S.016 / pH: x  Gluc: x / Ketone: Negative  / Bili: Negative / Urobili: Negative   Blood: x / Protein: Trace / Nitrite: Negative   Leuk Esterase: Negative / RBC: 14 /hpf / WBC 3 /HPF   Sq Epi: x / Non Sq Epi: 1 /hpf / Bacteria: 0.0        MICROBIOLOGY:    Culture - Urine (collected 2019 01:06)  Source: .Urine  Final Report (2019 01:28):    No growth    Culture - Body Fluid with Gram Stain (collected 2019 13:26)  Source: .Body Fluid  Gram Stain (2019 14:33):    Numerous polymorphonuclear leukocytes seen per low power field    Numerous Gram Negative Rods seen per oil power field  Preliminary Report (2019 10:08):    Numerous Gram Negative Rods    Few Gram Positive Cocci in Clusters        RADIOLOGY:        MEDICATIONS  (STANDING):  ALBUTerol/ipratropium for Nebulization 3 milliLiter(s) Nebulizer every 6 hours  apixaban 5 milliGRAM(s) Oral every 12 hours  buDESOnide    Inhalation Suspension 0.5 milliGRAM(s) Inhalation every 12 hours  chlorhexidine 2% Cloths 1 Application(s) Topical daily  dextrose 5%. 1000 milliLiter(s) (50 mL/Hr) IV Continuous <Continuous>  dextrose 50% Injectable 12.5 Gram(s) IV Push once  dextrose 50% Injectable 25 Gram(s) IV Push once  dextrose 50% Injectable 25 Gram(s) IV Push once  enalapril 10 milliGRAM(s) Oral every 12 hours  epoetin dejuan Injectable 10204 Unit(s) SubCutaneous <User Schedule>  fat emulsion (Fish Oil and Plant Based) 20% Infusion 12.5 mL/Hr (12.5 mL/Hr) IV Continuous <Continuous>  ferrous    sulfate 325 milliGRAM(s) Oral daily  furosemide    Tablet 40 milliGRAM(s) Oral every 24 hours  insulin glargine Injectable (LANTUS) 20 Unit(s) SubCutaneous at bedtime  insulin lispro (HumaLOG) corrective regimen sliding scale   SubCutaneous three times a day before meals  insulin lispro (HumaLOG) corrective regimen sliding scale   SubCutaneous at bedtime  montelukast 10 milliGRAM(s) Oral every 24 hours  nystatin    Suspension 822243 Unit(s) Oral two times a day  pantoprazole    Tablet 40 milliGRAM(s) Oral two times a day  simvastatin 20 milliGRAM(s) Oral at bedtime  spironolactone 25 milliGRAM(s) Oral daily    MEDICATIONS  (PRN):  acetaminophen   Tablet .. 650 milliGRAM(s) Oral every 6 hours PRN Mild Pain (1 - 3)  dextrose 40% Gel 15 Gram(s) Oral once PRN Blood Glucose LESS THAN 70 milliGRAM(s)/deciliter  glucagon  Injectable 1 milliGRAM(s) IntraMuscular once PRN Glucose LESS THAN 70 milligrams/deciliter  sodium chloride 0.65% Nasal 1 Spray(s) Both Nostrils daily PRN Nasal Congestion

## 2019-07-25 NOTE — PROGRESS NOTE ADULT - SUBJECTIVE AND OBJECTIVE BOX
Follow Up:      Interval History:    REVIEW OF SYSTEMS  [  ] ROS unobtainable because:    [  ] All other systems negative except as noted below    Constitutional:  [ ] fever [ ] chills  [ ] weight loss  [ ] weakness  Skin:  [ ] rash [ ] phlebitis	  Eyes: [ ] icterus [ ] pain  [ ] discharge	  ENMT: [ ] sore throat  [ ] thrush [ ] ulcers [ ] exudates  Respiratory: [ ] dyspnea [ ] hemoptysis [ ] cough [ ] sputum	  Cardiovascular:  [ ] chest pain [ ] palpitations [ ] edema	  Gastrointestinal:  [ ] nausea [ ] vomiting [ ] diarrhea [ ] constipation [ ] pain	  Genitourinary:  [ ] dysuria [ ] frequency [ ] hematuria [ ] discharge [ ] flank pain  [ ] incontinence  Musculoskeletal:  [ ] myalgias [ ] arthralgias [ ] arthritis  [ ] back pain  Neurological:  [ ] headache [ ] seizures  [ ] confusion/altered mental status    Allergies  Coreg (Other)  digoxin (Other; Short breath (Mild to Mod))  penicillins (Hives)        ANTIMICROBIALS:  nystatin    Suspension 365044 two times a day      OTHER MEDS:  MEDICATIONS  (STANDING):  acetaminophen   Tablet .. 650 every 6 hours PRN  ALBUTerol/ipratropium for Nebulization 3 every 6 hours  apixaban 5 every 12 hours  buDESOnide    Inhalation Suspension 0.5 every 12 hours  dextrose 40% Gel 15 once PRN  dextrose 50% Injectable 12.5 once  dextrose 50% Injectable 25 once  dextrose 50% Injectable 25 once  enalapril 10 every 12 hours  epoetin dejuan Injectable 26567 <User Schedule>  furosemide    Tablet 40 every 24 hours  glucagon  Injectable 1 once PRN  insulin glargine Injectable (LANTUS) 20 at bedtime  insulin lispro (HumaLOG) corrective regimen sliding scale  three times a day before meals  insulin lispro (HumaLOG) corrective regimen sliding scale  at bedtime  montelukast 10 every 24 hours  pantoprazole    Tablet 40 two times a day  simvastatin 20 at bedtime  spironolactone 25 daily      Vital Signs Last 24 Hrs  T(C): 36.8 (2019 17:17), Max: 36.8 (2019 17:17)  T(F): 98.2 (2019 17:17), Max: 98.2 (2019 17:17)  HR: 105 (2019 17:17) (102 - 114)  BP: 124/75 (2019 17:17) (103/66 - 145/69)  BP(mean): --  RR: 18 (2019 17:17) (18 - 18)  SpO2: 97% (2019 17:17) (96% - 99%)    PHYSICAL EXAM:  General: non-toxic  HEAD/EYES: anicteric, PERRL  ENT:  supple  Cardiovascular:   S1, S2  Respiratory:  clear bilaterally  GI:  soft, non-tender, normal bowel sounds  :  no CVA tenderness   Musculoskeletal:  no synovitis  Neurologic:  grossly non-focal  Skin:  no rash  Lymph: no lymphadenopathy  Psychiatric:  appropriate affect  Vascular:  no phlebitis                                7.9    14.7  )-----------( 420      ( 2019 11:23 )             26.0           135  |  101  |  25<H>  ----------------------------<  201<H>  4.3   |  22  |  0.51    Ca    8.6      2019 11:23  Phos  3.3       Mg     1.6         TPro  7.5  /  Alb  2.6<L>  /  TBili  0.3  /  DBili  <0.1  /  AST  29  /  ALT  24  /  AlkPhos  141<H>        Urinalysis Basic - ( 2019 22:25 )    Color: Light Yellow / Appearance: Clear / S.016 / pH: x  Gluc: x / Ketone: Negative  / Bili: Negative / Urobili: Negative   Blood: x / Protein: Trace / Nitrite: Negative   Leuk Esterase: Negative / RBC: 14 /hpf / WBC 3 /HPF   Sq Epi: x / Non Sq Epi: 1 /hpf / Bacteria: 0.0        MICROBIOLOGY:  v  .Urine  19   No growth  --  --      .Body Fluid  19   Numerous Escherichia coli  Few Coag Negative Staphylococcus  Numerous Escherichia coli #2  --  Escherichia coli  Escherichia coli      .Blood  07-10-19   No growth at 5 days.  --  --      .Blood  07-10-19   No growth at 5 days.  --  --      .Body Fluid abdominal fluid  19   Few Enterococcus faecalis  Few Coag Negative Staphylococcus  --  Enterococcus faecalis  Coag Negative Staphylococcus      .Body Fluid abdominal fluid  19   Few Enterococcus faecalis  Few Coag Negative Staphylococcus  --  Enterococcus faecalis  Coag Negative Staphylococcus      .Surgical Swab None Specimen Source:  abdomen, esw  19   Moderate Escherichia coli  Moderate Alpha hemolytic strep "Susceptibilities not performed"  Moderate Strep mitis oralis group "Susceptibilities not performed"  Growth in fluid media only Enterococcus faecalis  --  Escherichia coli  Enterococcus faecalis      .Blood  19   No growth at 5 days.  --  --      .Urine  19   No growth  --  --    RADIOLOGY: Follow Up:  Perforated Diverticulitis, Leukocytosis    Interval History: Patient more awake today compared to last week. Still with cough which is not productive. Notes some abdominal pain. Denies N/V/D. Denies dysuria or urinary frequency.     REVIEW OF SYSTEMS  [  ] ROS unobtainable because:    [  x] All other systems negative except as noted below    Constitutional:  [ ] fever [ ] chills  [ ] weight loss  [ ] weakness  Skin:  [ ] rash [ ] phlebitis	  Eyes: [ ] icterus [ ] pain  [ ] discharge	  ENMT: [ ] sore throat  [ ] thrush [ ] ulcers [ ] exudates  Respiratory: [ ] dyspnea [ ] hemoptysis [ x] cough [ ] sputum	  Cardiovascular:  [ ] chest pain [ ] palpitations [ ] edema	  Gastrointestinal:  [ ] nausea [ ] vomiting [ ] diarrhea [ ] constipation [ x] pain	  Genitourinary:  [ ] dysuria [ ] frequency [ ] hematuria [ ] discharge [ ] flank pain  [ ] incontinence  Musculoskeletal:  [ ] myalgias [ ] arthralgias [ ] arthritis  [ ] back pain  Neurological:  [ ] headache [ ] seizures  [ ] confusion/altered mental status    Allergies  Coreg (Other)  digoxin (Other; Short breath (Mild to Mod))  penicillins (Hives)        ANTIMICROBIALS:  nystatin    Suspension 885988 two times a day      OTHER MEDS:  MEDICATIONS  (STANDING):  acetaminophen   Tablet .. 650 every 6 hours PRN  ALBUTerol/ipratropium for Nebulization 3 every 6 hours  apixaban 5 every 12 hours  buDESOnide    Inhalation Suspension 0.5 every 12 hours  dextrose 40% Gel 15 once PRN  dextrose 50% Injectable 12.5 once  dextrose 50% Injectable 25 once  dextrose 50% Injectable 25 once  enalapril 10 every 12 hours  epoetin dejuan Injectable 59982 <User Schedule>  furosemide    Tablet 40 every 24 hours  glucagon  Injectable 1 once PRN  insulin glargine Injectable (LANTUS) 20 at bedtime  insulin lispro (HumaLOG) corrective regimen sliding scale  three times a day before meals  insulin lispro (HumaLOG) corrective regimen sliding scale  at bedtime  montelukast 10 every 24 hours  pantoprazole    Tablet 40 two times a day  simvastatin 20 at bedtime  spironolactone 25 daily      Vital Signs Last 24 Hrs  T(C): 36.8 (2019 17:17), Max: 36.8 (2019 17:17)  T(F): 98.2 (2019 17:17), Max: 98.2 (2019 17:17)  HR: 105 (2019 17:17) (102 - 114)  BP: 124/75 (2019 17:17) (103/66 - 145/69)  BP(mean): --  RR: 18 (2019 17:17) (18 - 18)  SpO2: 97% (2019 17:17) (96% - 99%)    PHYSICAL EXAMINATION:  General: Awake and alert, NAD  HEENT: PERRL, EOMI, No subconjunctival hemorrhages, Oropharynx Clear, MMM  Neck: Supple, No SARAHI  Cardiac: RRR, No M/R/G  Resp: CTAB, No Wh/Rh/Ra  Abdomen: Obese, +RLQ ostomy with moderate liquid stool output. +midline wound vac with serosanguinous drainage, LUQ drain removed, LLQ drain with serosanguinous material, NBS, ND, minimal abdominal pain today, No HSM, No rigidity or guarding  MSK: trace to 1+ LE edema and 1+ bilateral UE edema. No stigmata of IE. No evidence of phlebitis. No evidence of synovitis.  Skin: No rashes or lesions. Skin is warm and dry to the touch.   Neuro: Awake and alert. CN 2-12 Grossly intact. Moves all four extremities spontaneously.  Psych: Calm, pleasant cooperateive                          7.9    14.7  )-----------( 420      ( 2019 11:23 )             26.0       07-24    135  |  101  |  25<H>  ----------------------------<  201<H>  4.3   |  22  |  0.51    Ca    8.6      2019 11:23  Phos  3.3     07-24  Mg     1.6     07-24    TPro  7.5  /  Alb  2.6<L>  /  TBili  0.3  /  DBili  <0.1  /  AST  29  /  ALT  24  /  AlkPhos  141<H>  07-24      Urinalysis Basic - ( 2019 22:25 )    Color: Light Yellow / Appearance: Clear / S.016 / pH: x  Gluc: x / Ketone: Negative  / Bili: Negative / Urobili: Negative   Blood: x / Protein: Trace / Nitrite: Negative   Leuk Esterase: Negative / RBC: 14 /hpf / WBC 3 /HPF   Sq Epi: x / Non Sq Epi: 1 /hpf / Bacteria: 0.0        MICROBIOLOGY:  v  .Urine  19   No growth  --  --      .Body Fluid  19   Numerous Escherichia coli  Few Coag Negative Staphylococcus  Numerous Escherichia coli #2  --  Escherichia coli  Escherichia coli      .Blood  07-10-19   No growth at 5 days.  --  --      .Blood  07-10-19   No growth at 5 days.  --  --      .Body Fluid abdominal fluid  19   Few Enterococcus faecalis  Few Coag Negative Staphylococcus  --  Enterococcus faecalis  Coag Negative Staphylococcus      .Body Fluid abdominal fluid  19   Few Enterococcus faecalis  Few Coag Negative Staphylococcus  --  Enterococcus faecalis  Coag Negative Staphylococcus      .Surgical Swab None Specimen Source:  abdomen, esw  19   Moderate Escherichia coli  Moderate Alpha hemolytic strep "Susceptibilities not performed"  Moderate Strep mitis oralis group "Susceptibilities not performed"  Growth in fluid media only Enterococcus faecalis  --  Escherichia coli  Enterococcus faecalis      .Blood  19   No growth at 5 days.  --  --      .Urine  19   No growth  --  --    RADIOLOGY:    No new imaging was available for review at the time of assessment

## 2019-07-25 NOTE — PROGRESS NOTE ADULT - ASSESSMENT
A/P:74F admitted with abdominal pain and exam findings consistent with peritonitis secondary to acute diverticulitis at the splenic flexure with evidence of contained perforation now s/p Exploratory laparotomy, left hemicolectomy, end colostomy, and ABThera Vac placement and s/p from RTOR for abdominal washout and Abthera vac placement, off pressors. TPN started 7/5 for Protein-Calorie Malnutrition    - TPN goal: Carbohydrates: 210grams, Amino Acid: 120grams, 40g lipids decreased from 2000 to 1200 ml      TPN stopped as pt tolerating / eating more - Dysphagia I DM      ENCOURAGE PO- d/w daughter  - Elevated WBC improved- fluid Cx and UA pending      Continue to monitor BASIM output  - Strict Intake and Output.,   - Weights three times a week  - Monitor BMP, Mg, Ionized Ca, Phosphorus daily  - Pre-albumin weekly.  - Hyperglycemia- Continue FS w/ sliding scale coverage.   - Hyperkalemia -stabilized  - HyperNa- stabilized  - Anemia- pt is a [employer]- continue mngt per primary team, Hematology note appreciated  - Continue as per SICU/ Surgery d/w team, will follow with you    TPN team, pager [phone], spectra [phone]  D/w Adela Cross

## 2019-07-25 NOTE — PROGRESS NOTE ADULT - ASSESSMENT
74 year old female PMHx of HFrEF s/p AICD, asthma, HTN, HLD, atrial fibrillation on eliquis who presented to [facility] on 6/30 with diverticulitis at splenic flexure with perforation on repeat scan on 7/1.  On 7/2 patient underwent Exploratory laparotomy, partial colectomy, end colostomy, and placement of temporary abdominal VAC dressing. Intraoperatively was noted of having perforation of distal transverse colon with feculent peritonitis. On 7/3 patient underwent Reopening of abdominal wound and an abdominal washout. On 7/9 patient RTOR for Abthera Vac placement and was found to have viable Edematous bowel and minimal pus-like fluid in pelvis which was sent for culture. On 7/10 hypotensive to SBP in the 80's and broadened to Imipenem. Wound cultures from 7/2/19 with E. coli, AHS, Strep mitis and Enterococcus faecalis (amp susceptible).    Ciprofloxacin 6/30 - 7/1  Aztreonam 7/2 - 7/4  Flagyl 6/30 - 7/9  Ceftriaxone 7/4 - 7/9  Imipenem 7/10 -- 7/21    Overall, feculent peritonitis and resolved septic shock secondary to perforated diverticulitis.   7/9 surgical cultures with enterococcus. Imipenem offers coverage for ampicillin susceptible enterococcus   CoNS noted on 7/9 surgical cultures - suspect it is possible contaminant  7/10 blood cultures negative  7/11 RTOR with no evidence of purulence/infection noted  Patient developed cough but CT Chest (7/16) with mucous plugging - no evidence of pneumonia. RVP negative  CT A/P (7/16) with ascites but no abscess  Completed course of Imipenem  Given persistent leukocytosis BASIM drain cultures were checked - overall unclear significance of E. coli and CoNS on these cultures - they may reflect colonization of the BASIM drain as they have been in for longer than 2 weeks  If on repeat blood work there is leukocytosis would recommend repeat CT C/A/P to evaluate for residual intraabdominal abscess and to evaluate for PNA (CXR may be of poor yield given her body habitus)    #Perforated Diverticulitis  --Continue to Monitor off of antibiotics  --Please check CBC with diff tomorrow as well as CMP  --If on repeat CBC there is stable or worsening leukocytosis recommend repeat CT C/A/P to evaluate for intraabdominal abscess and to evaluate for PNA (CXR may be of poor yield given her body habitus)  --Recommend BCx with new fever    #Cough  --If worsening leukocytosis tomorrow recommend repeat CT C/A/P as above    #Encephalopathy (improved)     #Leukocytosis - likely related to prior infection/recent surgery  --Please check CBC with diff tomorrow 74 year old female PMHx of HFrEF s/p AICD, asthma, HTN, HLD, atrial fibrillation on eliquis who presented to [facility] on 6/30 with diverticulitis at splenic flexure with perforation on repeat scan on 7/1.  On 7/2 patient underwent Exploratory laparotomy, partial colectomy, end colostomy, and placement of temporary abdominal VAC dressing. Intraoperatively was noted of having perforation of distal transverse colon with feculent peritonitis. On 7/3 patient underwent Reopening of abdominal wound and an abdominal washout. On 7/9 patient RTOR for Abthera Vac placement and was found to have viable Edematous bowel and minimal pus-like fluid in pelvis which was sent for culture. On 7/10 hypotensive to SBP in the 80's and broadened to Imipenem. Wound cultures from 7/2/19 with E. coli, AHS, Strep mitis and Enterococcus faecalis (amp susceptible).    Ciprofloxacin 6/30 - 7/1  Aztreonam 7/2 - 7/4  Flagyl 6/30 - 7/9  Ceftriaxone 7/4 - 7/9  Imipenem 7/10 -- 7/21    Overall, feculent peritonitis and resolved septic shock secondary to perforated diverticulitis.   7/9 surgical cultures with enterococcus. Imipenem offers coverage for ampicillin susceptible enterococcus   CoNS noted on 7/9 surgical cultures - suspect it is possible contaminant  7/10 blood cultures negative  7/11 RTOR with no evidence of purulence/infection noted  Patient developed cough but CT Chest (7/16) with mucous plugging - no evidence of pneumonia. RVP negative  CT A/P (7/16) with ascites but no abscess  Completed course of Imipenem  Given persistent leukocytosis BASIM drain cultures were checked - overall unclear significance of E. coli and CoNS on these cultures - they may reflect colonization of the BASIM drain as they have been in for longer than 2 weeks  If on repeat blood work there is leukocytosis would recommend repeat CT C/A/P to evaluate for residual intraabdominal abscess and to evaluate for PNA (CXR may be of poor yield given her body habitus)    #Perforated Diverticulitis  --Continue to Monitor off of antibiotics  --Please check CBC with diff tomorrow as well as CMP  --If on repeat CBC there is stable or worsening leukocytosis recommend repeat CT C/A/P to evaluate for intraabdominal abscess and to evaluate for PNA (CXR may be of poor yield given her body habitus)  --Recommend BCx with new fever    #Cough  --If worsening leukocytosis tomorrow recommend repeat CT C/A/P as above    #Encephalopathy (improved)     #Leukocytosis - likely related to prior infection/recent surgery  --Please check CBC with diff tomorrow    I will continue to follow. Please feel free to contact me with any further questions.    Rex Goode M.D.  [facility] Division of Infectious Disease  8AM-5PM: Pager Number [phone]  After Hours (or if no response): Please contact the Infectious Diseases Office at [phone] 74 year old female PMHx of HFrEF s/p AICD, asthma, HTN, HLD, atrial fibrillation on eliquis who presented to [facility] on 6/30 with diverticulitis at splenic flexure with perforation on repeat scan on 7/1.  On 7/2 patient underwent Exploratory laparotomy, partial colectomy, end colostomy, and placement of temporary abdominal VAC dressing. Intraoperatively was noted of having perforation of distal transverse colon with feculent peritonitis. On 7/3 patient underwent Reopening of abdominal wound and an abdominal washout. On 7/9 patient RTOR for Abthera Vac placement and was found to have viable Edematous bowel and minimal pus-like fluid in pelvis which was sent for culture. On 7/10 hypotensive to SBP in the 80's and broadened to Imipenem. Wound cultures from 7/2/19 with E. coli, AHS, Strep mitis and Enterococcus faecalis (amp susceptible).    Ciprofloxacin 6/30 - 7/1  Aztreonam 7/2 - 7/4  Flagyl 6/30 - 7/9  Ceftriaxone 7/4 - 7/9  Imipenem 7/10 -- 7/21    Overall, feculent peritonitis and resolved septic shock secondary to perforated diverticulitis.   7/9 surgical cultures with enterococcus. Imipenem offers coverage for ampicillin susceptible enterococcus   CoNS noted on 7/9 surgical cultures - suspect it is possible contaminant  7/10 blood cultures negative  7/11 RTOR with no evidence of purulence/infection noted  Patient developed cough but CT Chest (7/16) with mucous plugging - no evidence of pneumonia. RVP negative  CT A/P (7/16) with ascites but no abscess  Completed course of Imipenem  Given persistent leukocytosis BASIM drain cultures were checked - overall unclear significance of E. coli and CoNS on these cultures - they may reflect colonization of the BASIM drain as they have been in for longer than 2 weeks  If on repeat blood work there is leukocytosis would recommend repeat CT C/A/P to evaluate for residual intraabdominal abscess and to evaluate for PNA (CXR may be of poor yield given her body habitus)    #Perforated Diverticulitis  --Continue to Monitor off of antibiotics  --Please check CBC with diff tomorrow as well as CMP  --If on repeat CBC there is stable or worsening leukocytosis recommend repeat CT C/A/P with IV contrast to evaluate for intraabdominal abscess and to evaluate for PNA (CXR may be of poor yield given her body habitus)  --Recommend BCx with new fever    #Cough  --If worsening leukocytosis tomorrow recommend repeat CT C/A/P as above    #Encephalopathy (improved)     #Leukocytosis - likely related to prior infection/recent surgery  --Please check CBC with diff tomorrow    I will continue to follow. Please feel free to contact me with any further questions.    Rex Goode M.D.  [facility] Division of Infectious Disease  8AM-5PM: Pager Number [phone]  After Hours (or if no response): Please contact the Infectious Diseases Office at [phone]

## 2019-07-26 LAB
ANION GAP SERPL CALC-SCNC: 16 MMOL/L — SIGNIFICANT CHANGE UP (ref 5–17)
BUN SERPL-MCNC: 21 MG/DL — SIGNIFICANT CHANGE UP (ref 7–23)
CALCIUM SERPL-MCNC: 8.6 MG/DL — SIGNIFICANT CHANGE UP (ref 8.4–10.5)
CHLORIDE SERPL-SCNC: 98 MMOL/L — SIGNIFICANT CHANGE UP (ref 96–108)
CO2 SERPL-SCNC: 23 MMOL/L — SIGNIFICANT CHANGE UP (ref 22–31)
CREAT SERPL-MCNC: 0.57 MG/DL — SIGNIFICANT CHANGE UP (ref 0.5–1.3)
GLUCOSE BLDC GLUCOMTR-MCNC: 105 MG/DL — HIGH (ref 70–99)
GLUCOSE BLDC GLUCOMTR-MCNC: 131 MG/DL — HIGH (ref 70–99)
GLUCOSE BLDC GLUCOMTR-MCNC: 151 MG/DL — HIGH (ref 70–99)
GLUCOSE BLDC GLUCOMTR-MCNC: 167 MG/DL — HIGH (ref 70–99)
GLUCOSE SERPL-MCNC: 165 MG/DL — HIGH (ref 70–99)
HCT VFR BLD CALC: 31.5 % — LOW (ref 34.5–45)
HGB BLD-MCNC: 9.4 G/DL — LOW (ref 11.5–15.5)
MAGNESIUM SERPL-MCNC: 1.6 MG/DL — SIGNIFICANT CHANGE UP (ref 1.6–2.6)
MCHC RBC-ENTMCNC: 29.7 GM/DL — LOW (ref 32–36)
MCHC RBC-ENTMCNC: 31.5 PG — SIGNIFICANT CHANGE UP (ref 27–34)
MCV RBC AUTO: 106 FL — HIGH (ref 80–100)
PHOSPHATE SERPL-MCNC: 3.1 MG/DL — SIGNIFICANT CHANGE UP (ref 2.5–4.5)
PLATELET # BLD AUTO: 496 K/UL — HIGH (ref 150–400)
POTASSIUM SERPL-MCNC: 4.2 MMOL/L — SIGNIFICANT CHANGE UP (ref 3.5–5.3)
POTASSIUM SERPL-SCNC: 4.2 MMOL/L — SIGNIFICANT CHANGE UP (ref 3.5–5.3)
RBC # BLD: 2.98 M/UL — LOW (ref 3.8–5.2)
RBC # FLD: 19.9 % — HIGH (ref 10.3–14.5)
SODIUM SERPL-SCNC: 137 MMOL/L — SIGNIFICANT CHANGE UP (ref 135–145)
WBC # BLD: 12 K/UL — HIGH (ref 3.8–10.5)
WBC # FLD AUTO: 12 K/UL — HIGH (ref 3.8–10.5)

## 2019-07-26 PROCEDURE — 99232 SBSQ HOSP IP/OBS MODERATE 35: CPT | Mod: GC

## 2019-07-26 PROCEDURE — 99233 SBSQ HOSP IP/OBS HIGH 50: CPT | Mod: GC

## 2019-07-26 PROCEDURE — 99232 SBSQ HOSP IP/OBS MODERATE 35: CPT

## 2019-07-26 RX ORDER — ERYTHROPOIETIN 10000 [IU]/ML
20000 INJECTION, SOLUTION INTRAVENOUS; SUBCUTANEOUS
Refills: 0 | Status: DISCONTINUED | OUTPATIENT
Start: 2019-07-26 | End: 2019-08-02

## 2019-07-26 RX ADMIN — Medication 500000 UNIT(S): at 05:24

## 2019-07-26 RX ADMIN — Medication 3 MILLILITER(S): at 01:58

## 2019-07-26 RX ADMIN — APIXABAN 5 MILLIGRAM(S): 2.5 TABLET, FILM COATED ORAL at 17:36

## 2019-07-26 RX ADMIN — SPIRONOLACTONE 25 MILLIGRAM(S): 25 TABLET, FILM COATED ORAL at 05:24

## 2019-07-26 RX ADMIN — Medication 2: at 17:34

## 2019-07-26 RX ADMIN — Medication 650 MILLIGRAM(S): at 09:12

## 2019-07-26 RX ADMIN — INSULIN GLARGINE 20 UNIT(S): 100 INJECTION, SOLUTION SUBCUTANEOUS at 22:01

## 2019-07-26 RX ADMIN — Medication 3 MILLILITER(S): at 22:01

## 2019-07-26 RX ADMIN — Medication 10 MILLIGRAM(S): at 05:23

## 2019-07-26 RX ADMIN — PANTOPRAZOLE SODIUM 40 MILLIGRAM(S): 20 TABLET, DELAYED RELEASE ORAL at 17:36

## 2019-07-26 RX ADMIN — Medication 2: at 08:13

## 2019-07-26 RX ADMIN — SIMVASTATIN 20 MILLIGRAM(S): 20 TABLET, FILM COATED ORAL at 22:01

## 2019-07-26 RX ADMIN — ERYTHROPOIETIN 20000 UNIT(S): 10000 INJECTION, SOLUTION INTRAVENOUS; SUBCUTANEOUS at 08:13

## 2019-07-26 RX ADMIN — Medication 325 MILLIGRAM(S): at 11:43

## 2019-07-26 RX ADMIN — Medication 3 MILLILITER(S): at 08:22

## 2019-07-26 RX ADMIN — APIXABAN 5 MILLIGRAM(S): 2.5 TABLET, FILM COATED ORAL at 05:24

## 2019-07-26 RX ADMIN — Medication 0.5 MILLIGRAM(S): at 05:24

## 2019-07-26 RX ADMIN — MONTELUKAST 10 MILLIGRAM(S): 4 TABLET, CHEWABLE ORAL at 11:45

## 2019-07-26 RX ADMIN — Medication 0.5 MILLIGRAM(S): at 17:36

## 2019-07-26 RX ADMIN — CHLORHEXIDINE GLUCONATE 1 APPLICATION(S): 213 SOLUTION TOPICAL at 08:21

## 2019-07-26 RX ADMIN — PANTOPRAZOLE SODIUM 40 MILLIGRAM(S): 20 TABLET, DELAYED RELEASE ORAL at 05:24

## 2019-07-26 RX ADMIN — Medication 10 MILLIGRAM(S): at 17:36

## 2019-07-26 RX ADMIN — Medication 3 MILLILITER(S): at 14:00

## 2019-07-26 RX ADMIN — Medication 40 MILLIGRAM(S): at 05:24

## 2019-07-26 NOTE — SWALLOW BEDSIDE ASSESSMENT ADULT - SLP PRECAUTIONS/LIMITATIONS: VISION
Partially impaired: cannot see medication labels or newsprint, but can see obstacles in path, and the surrounding layout; can count fingers at arm's length/impaired
quit in 2015 after open heart surgery

## 2019-07-26 NOTE — SWALLOW BEDSIDE ASSESSMENT ADULT - SWALLOW EVAL: VELAR ELEVATION
TAMANNA due to reduced oral aperture
TAMANNA due to reduced participation
TAMANNA due to reduced oral aperture
TAMANNA due to reduced oral aperture

## 2019-07-26 NOTE — SWALLOW BEDSIDE ASSESSMENT ADULT - ASR SWALLOW DENTITION
partial lower dentition, missing upper dentition. Daughter reports Pt has full upper dentures and partial lower dentures in her possession.
partial lower dentition; missing upper dentition. Has dentures, not currently in oral cavity/incomplete
incomplete/completely missing upper dentition/partially missing lower dentition
incomplete

## 2019-07-26 NOTE — SWALLOW BEDSIDE ASSESSMENT ADULT - SLP GENERAL OBSERVATIONS
Pt encountered bedside, AA&Ox2. +improved vocal volume/quality compared to previous visits. At times, hoarse vocal quality persists, possibly due to reduced coordination of respiration/phonation. Pt's friend's brought Pt mechanical soft lunch with thin liquids. Upon SLPs arrival to room, Pt consuming thin liquids.  RN reported Pt with cough/congestion. Education provided to both pt and friend's regarding current diet recommendation of puree/honey thickened liquids only.

## 2019-07-26 NOTE — PROGRESS NOTE ADULT - SUBJECTIVE AND OBJECTIVE BOX
[facility] NUTRITION SUPPORT / TPN -- FOLLOW UP NOTE  --------------------------------------------------------------------------------    24 hour events/subjective:  - Pt more awake/ alert  - Tolerating dysphagia diet, eating more- daughter wants to bring in food favorites to help encourage  - No acute events overnight  - more alert later in the day, no n/v/d  - no f/c/s  - no cp/ palp/ sob  - Pt on tele on 9monti  - Encouraged eating and oob to chair       Encourage ambulation and increased mobility/ pt being offloaded  - PT to follow- pt for MATT d/c    Diet:  Diet, Dysphagia 1 Pureed-Honey Consistency Fluid:   Consistent Carbohydrate Evening Snack (CSTCHOSN) (07-22-19 @ 09:49)      Appetite: [  ]Poor [ x ]Adequate [  ]Good  Caloric intake:  [  x ]  Adequate   [   ] Inadequate    ROS: General/ GI see HPI  all other systems negative    ALLERGIES & MEDICATIONS  --------------------------------------------------------------------------------  ALLERGIES  Coreg (Other)  digoxin (Other; Short breath (Mild to Mod))  penicillins (Hives)    INTOLERANCES  metoprolol (Other)  Solu-Medrol (Other (Mild to Mod))    STANDING INPATIENT MEDICATIONS    ALBUTerol/ipratropium for Nebulization 3 milliLiter(s) Nebulizer every 6 hours  apixaban 5 milliGRAM(s) Oral every 12 hours  buDESOnide    Inhalation Suspension 0.5 milliGRAM(s) Inhalation every 12 hours  chlorhexidine 2% Cloths 1 Application(s) Topical daily  dextrose 5%. 1000 milliLiter(s) IV Continuous <Continuous>  dextrose 50% Injectable 12.5 Gram(s) IV Push once  dextrose 50% Injectable 25 Gram(s) IV Push once  dextrose 50% Injectable 25 Gram(s) IV Push once  enalapril 10 milliGRAM(s) Oral every 12 hours  epoetin dejuan Injectable 21376 Unit(s) SubCutaneous <User Schedule>  ferrous    sulfate 325 milliGRAM(s) Oral daily  furosemide    Tablet 40 milliGRAM(s) Oral every 24 hours  insulin glargine Injectable (LANTUS) 20 Unit(s) SubCutaneous at bedtime  insulin lispro (HumaLOG) corrective regimen sliding scale   SubCutaneous three times a day before meals  insulin lispro (HumaLOG) corrective regimen sliding scale   SubCutaneous at bedtime  montelukast 10 milliGRAM(s) Oral every 24 hours  nystatin    Suspension 967850 Unit(s) Oral two times a day  pantoprazole    Tablet 40 milliGRAM(s) Oral two times a day  simvastatin 20 milliGRAM(s) Oral at bedtime  spironolactone 25 milliGRAM(s) Oral daily      PRN INPATIENT MEDICATION  acetaminophen   Tablet .. 650 milliGRAM(s) Oral every 6 hours PRN  dextrose 40% Gel 15 Gram(s) Oral once PRN  glucagon  Injectable 1 milliGRAM(s) IntraMuscular once PRN  sodium chloride 0.65% Nasal 1 Spray(s) Both Nostrils daily PRN        VITALS/PHYSICAL EXAM  --------------------------------------------------------------------------------  T(C): 36.6 (07-26-19 @ 13:40), Max: 36.9 (07-26-19 @ 02:01)  HR: 100 (07-26-19 @ 13:40) (100 - 122)  BP: 131/84 (07-26-19 @ 13:40) (109/74 - 132/74)  RR: 18 (07-26-19 @ 13:40) (16 - 18)  SpO2: 97% (07-26-19 @ 13:40) (95% - 100%)  Wt(kg): --        07-25-19 @ 07:01  -  07-26-19 @ 07:00  --------------------------------------------------------  IN: 360 mL / OUT: 1370 mL / NET: -1010 mL    07-26-19 @ 07:01  -  07-26-19 @ 14:06  --------------------------------------------------------  IN: 170 mL / OUT: 350 mL / NET: -180 mL    Physical Exam:    	Gen: NAD, WD/ WN/ A&Ox3  	HEENT: NC/AT, PERRL, mucosa moist, supple neck, clear oropharynx  	Chest: clear  	GI: +softly distended, nontender, no BS                  (+)Ostomy pink & viable. liquid & soft formed stool                  VAC intact w/ good seal, drains w/ cloudy drainage              :  (+)Ching              MSK/Vascular: Passive ROM x4,  no clubbing, cyanosis, nor edema                   RUE PICC dressing C/D/I w/o SOI w/ no edema  	Neuro: Awake and alert  	Skin: Warm, good turgor, without rashes        LABS/ CULTURES/ RADIOLOGY:              9.4    12.0  >-----------<  496      [07-26-19 @ 09:08]              31.5     137  |  98  |  21  ----------------------------<  165      [07-26-19 @ 09:08]  4.2   |  23  |  0.57        Ca     8.6     [07-26-19 @ 09:08]      Mg     1.6     [07-26-19 @ 09:08]      Phos  3.1     [07-26-19 @ 09:08]        CAPILLARY BLOOD GLUCOSE  POCT Blood Glucose.: 131 mg/dL (26 Jul 2019 12:33)  POCT Blood Glucose.: 151 mg/dL (26 Jul 2019 08:08)  POCT Blood Glucose.: 146 mg/dL (25 Jul 2019 21:39)  POCT Blood Glucose.: 165 mg/dL (25 Jul 2019 16:50)  POCT Blood Glucose.: 169 mg/dL (25 Jul 2019 14:58)    Prealbumin, Serum: 12 mg/dL (07-24-19 @ 14:57)  Prealbumin, Serum: 5 mg/dL (07-04-19 @ 22:45)

## 2019-07-26 NOTE — SWALLOW BEDSIDE ASSESSMENT ADULT - SWALLOW EVAL: RECOMMENDED FEEDING/EATING TECHNIQUES
allow for swallow between intakes/oral hygiene/small sips/bites/crush medication (when feasible)/hard swallow w/ each bite or sip/position upright (90 degrees)/alternate food with liquid/maintain upright posture during/after eating for 30 mins
maintain upright posture during/after eating for 30 mins/oral hygiene/position upright (90 degrees)/alternate food with liquid/check mouth frequently for oral residue/pocketing/crush medication (when feasible)/allow for swallow between intakes/small sips/bites
check mouth frequently for oral residue/pocketing/crush medication (when feasible)/alternate food with liquid/hard swallow w/ each bite or sip/allow for swallow between intakes

## 2019-07-26 NOTE — SWALLOW BEDSIDE ASSESSMENT ADULT - SWALLOW EVAL: RECOMMENDED DIET
1) Dysphagia I with honey thickened liquids 2) Full supervision with all PO 3) MUST BE ALERT/AWAKE
1)dysphagia I with honey thickened liquids 2) 100% assistance due to overall deconditioning
NPO, with non-oral nutrition/hydration/medications.
Dysphagia I with honey thickened liquids

## 2019-07-26 NOTE — SWALLOW BEDSIDE ASSESSMENT ADULT - SWALLOW EVAL: ORAL MUSCULATURE
Limited assessment due to poor participation/comprehension
generally intact
unable to assess due to poor participation/comprehension
generally intact

## 2019-07-26 NOTE — SWALLOW BEDSIDE ASSESSMENT ADULT - MUCOSAL QUALITY
+ white coated lingual surface. Known thrush - currently given Nystatin.
+xerostomia; slightly white coated lingual surface
+xerostomia  + mild white coat upon posterior lingual surface
WFL

## 2019-07-26 NOTE — PROGRESS NOTE ADULT - ASSESSMENT
A/P: 73 yo Female with PMH of asthma, A fib, HFrEF with perforated diverticulitis s/p L hemicolectomy and end colostomy with VAC placement along with multiple washouts and closure with bridging mesh.  Doing well, with good PO intake.  New output from drain is worrisome, but WBC have been downtrending.  Will f/u results from morning labs today.    - Continue Chest PT  - Continue PO diet and f/u calorie count  - F/U drain culture  - F/U Labs  - ID following, will appreciate recs and notify if new leukocytosis  - F/U with Plastics as outpatient for graft and definitive closure      [facility], [phone] A/P: 75 yo Female with PMH of asthma, A fib, HFrEF with perforated diverticulitis s/p L hemicolectomy and end colostomy with VAC placement along with multiple washouts and closure with bridging mesh.  Doing well, with good PO intake.  New output from drain is worrisome, but WBC have been downtrending.  Will f/u results from morning labs today.    - Continue Chest PT  - Continue PO diet and f/u calorie count  - F/U drain culture  - F/U Labs  - ID following, will appreciate recs and notify if new leukocytosis  - F/U with Plastics as outpatient for graft and definitive closure  - Vac change today, will attempt to obtain photographs      [facility], [phone]

## 2019-07-26 NOTE — PROGRESS NOTE ADULT - SUBJECTIVE AND OBJECTIVE BOX
Patient seen and examined at bedside.    Overnight Events:       Current Meds:  acetaminophen   Tablet .. 650 milliGRAM(s) Oral every 6 hours PRN  ALBUTerol/ipratropium for Nebulization 3 milliLiter(s) Nebulizer every 6 hours  apixaban 5 milliGRAM(s) Oral every 12 hours  buDESOnide    Inhalation Suspension 0.5 milliGRAM(s) Inhalation every 12 hours  chlorhexidine 2% Cloths 1 Application(s) Topical daily  dextrose 40% Gel 15 Gram(s) Oral once PRN  dextrose 5%. 1000 milliLiter(s) IV Continuous <Continuous>  dextrose 50% Injectable 12.5 Gram(s) IV Push once  dextrose 50% Injectable 25 Gram(s) IV Push once  dextrose 50% Injectable 25 Gram(s) IV Push once  enalapril 10 milliGRAM(s) Oral every 12 hours  epoetin dejuan Injectable 82455 Unit(s) SubCutaneous <User Schedule>  ferrous    sulfate 325 milliGRAM(s) Oral daily  furosemide    Tablet 40 milliGRAM(s) Oral every 24 hours  glucagon  Injectable 1 milliGRAM(s) IntraMuscular once PRN  insulin glargine Injectable (LANTUS) 20 Unit(s) SubCutaneous at bedtime  insulin lispro (HumaLOG) corrective regimen sliding scale   SubCutaneous three times a day before meals  insulin lispro (HumaLOG) corrective regimen sliding scale   SubCutaneous at bedtime  montelukast 10 milliGRAM(s) Oral every 24 hours  nystatin    Suspension 306739 Unit(s) Oral two times a day  pantoprazole    Tablet 40 milliGRAM(s) Oral two times a day  simvastatin 20 milliGRAM(s) Oral at bedtime  sodium chloride 0.65% Nasal 1 Spray(s) Both Nostrils daily PRN  spironolactone 25 milliGRAM(s) Oral daily      Vitals:  T(F): 98.5 (07-26), Max: 98.5 (07-26)  HR: 105 (07-26) (102 - 122)  BP: 113/74 (07-26) (108/70 - 145/69)  RR: 16 (07-26)  SpO2: 97% (07-26)  I&O's Summary    25 Jul 2019 07:01  -  26 Jul 2019 07:00  --------------------------------------------------------  IN: 360 mL / OUT: 1370 mL / NET: -1010 mL      Physical Exam:  Appearance: No acute distress; sleeping comfortably  HEENT:  EOMI, sclera anicteric, Normal oral mucosa  Cardiovascular: RRR, S1, S2, no murmurs, rubs, or gallops; no edema; JVD to mandible  Respiratory: Coarse breath sounds bilaterally  Gastrointestinal: soft, Right sided wound vac, BASIM drain x 1  Musculoskeletal: No clubbing; no joint deformity   Extremities: 1+ edema  Neurologic: Non-focal  Skin: No rashes, ecchymoses, or cyanosis                                   7.9    14.7  )-----------( 420      ( 24 Jul 2019 11:23 )             26.0     07-24    135  |  101  |  25<H>  ----------------------------<  201<H>  4.3   |  22  |  0.51    Ca    8.6      24 Jul 2019 11:23  Phos  3.3     07-24  Mg     1.6     07-24    TPro  7.5  /  Alb  2.6<L>  /  TBili  0.3  /  DBili  <0.1  /  AST  29  /  ALT  24  /  AlkPhos  141<H>  07-24        Echo:  7/3/19  Conclusions:  1. Mitral annular calcification. Peak mitral valve gradient  equals 9 mm Hg, mean transmitral valve gradient equals 5 mm  Hg, consistent with moderate mitral stenosis.  2. Aortic valve not well visualized; appears calcified.  Peak transaortic valve gradient equals 25 mm Hg, mean  transaortic valve gradient equals 12 mm Hg, aortic valve  velocity time integral equals 34 cm, consistent with mild  aortic stenosis.  3. Moderate segmental left ventricular systolic  dysfunction. Difficult to assess LV function. Not all  myocardial segments well visualized. Endocardial  visualization enhanced with intravenous injection of  Ultrasonic Enhancing Agent (Definity). The apex appears  aneurysmal. No obvious left ventricular thrombus.  4. The right ventricle is not well visualized.A device  wire is noted in the right heart.      Cath:  3/8/18  CORONARY VESSELS: The coronary circulation is right dominant.  LM:   --  LM: Normal.  LAD:   --  LAD: Normal.  CX:   --  Circumflex: Normal.  RCA:   --  RCA: Normal.  COMPLICATIONS: There were no complications.  DIAGNOSTIC RECOMMENDATIONS: The patient should continue with the present  medications. Patient seen and examined at bedside.    Overnight Events: NAEO, patient feeling frustrated 2/2 prolonged hospitalization.    Current Meds:  acetaminophen   Tablet .. 650 milliGRAM(s) Oral every 6 hours PRN  ALBUTerol/ipratropium for Nebulization 3 milliLiter(s) Nebulizer every 6 hours  apixaban 5 milliGRAM(s) Oral every 12 hours  buDESOnide    Inhalation Suspension 0.5 milliGRAM(s) Inhalation every 12 hours  chlorhexidine 2% Cloths 1 Application(s) Topical daily  dextrose 40% Gel 15 Gram(s) Oral once PRN  dextrose 5%. 1000 milliLiter(s) IV Continuous <Continuous>  dextrose 50% Injectable 12.5 Gram(s) IV Push once  dextrose 50% Injectable 25 Gram(s) IV Push once  dextrose 50% Injectable 25 Gram(s) IV Push once  enalapril 10 milliGRAM(s) Oral every 12 hours  epoetin dejuan Injectable 00015 Unit(s) SubCutaneous <User Schedule>  ferrous    sulfate 325 milliGRAM(s) Oral daily  furosemide    Tablet 40 milliGRAM(s) Oral every 24 hours  glucagon  Injectable 1 milliGRAM(s) IntraMuscular once PRN  insulin glargine Injectable (LANTUS) 20 Unit(s) SubCutaneous at bedtime  insulin lispro (HumaLOG) corrective regimen sliding scale   SubCutaneous three times a day before meals  insulin lispro (HumaLOG) corrective regimen sliding scale   SubCutaneous at bedtime  montelukast 10 milliGRAM(s) Oral every 24 hours  nystatin    Suspension 135786 Unit(s) Oral two times a day  pantoprazole    Tablet 40 milliGRAM(s) Oral two times a day  simvastatin 20 milliGRAM(s) Oral at bedtime  sodium chloride 0.65% Nasal 1 Spray(s) Both Nostrils daily PRN  spironolactone 25 milliGRAM(s) Oral daily      Vitals:  T(F): 98.5 (07-26), Max: 98.5 (07-26)  HR: 105 (07-26) (102 - 122)  BP: 113/74 (07-26) (108/70 - 145/69)  RR: 16 (07-26)  SpO2: 97% (07-26)  I&O's Summary    25 Jul 2019 07:01  -  26 Jul 2019 07:00  --------------------------------------------------------  IN: 360 mL / OUT: 1370 mL / NET: -1010 mL      Physical Exam:  Appearance: No acute distress; sleeping comfortably  HEENT:  EOMI, sclera anicteric, Normal oral mucosa  Cardiovascular: RRR, S1, S2, no murmurs, rubs, or gallops; no edema; JVD to clavicle  Respiratory: Coarse breath sounds bilaterally  Gastrointestinal: soft, Right sided wound vac, BASIM drain x 1  Musculoskeletal: No clubbing; no joint deformity   Extremities: 1+ edema  Neurologic: Non-focal  Skin: No rashes, ecchymoses, or cyanosis                                   7.9    14.7  )-----------( 420      ( 24 Jul 2019 11:23 )             26.0     07-24    135  |  101  |  25<H>  ----------------------------<  201<H>  4.3   |  22  |  0.51    Ca    8.6      24 Jul 2019 11:23  Phos  3.3     07-24  Mg     1.6     07-24    TPro  7.5  /  Alb  2.6<L>  /  TBili  0.3  /  DBili  <0.1  /  AST  29  /  ALT  24  /  AlkPhos  141<H>  07-24        Echo:  7/3/19  Conclusions:  1. Mitral annular calcification. Peak mitral valve gradient  equals 9 mm Hg, mean transmitral valve gradient equals 5 mm  Hg, consistent with moderate mitral stenosis.  2. Aortic valve not well visualized; appears calcified.  Peak transaortic valve gradient equals 25 mm Hg, mean  transaortic valve gradient equals 12 mm Hg, aortic valve  velocity time integral equals 34 cm, consistent with mild  aortic stenosis.  3. Moderate segmental left ventricular systolic  dysfunction. Difficult to assess LV function. Not all  myocardial segments well visualized. Endocardial  visualization enhanced with intravenous injection of  Ultrasonic Enhancing Agent (Definity). The apex appears  aneurysmal. No obvious left ventricular thrombus.  4. The right ventricle is not well visualized.A device  wire is noted in the right heart.      Cath:  3/8/18  CORONARY VESSELS: The coronary circulation is right dominant.  LM:   --  LM: Normal.  LAD:   --  LAD: Normal.  CX:   --  Circumflex: Normal.  RCA:   --  RCA: Normal.  COMPLICATIONS: There were no complications.  DIAGNOSTIC RECOMMENDATIONS: The patient should continue with the present  medications.

## 2019-07-26 NOTE — SWALLOW BEDSIDE ASSESSMENT ADULT - PHARYNGEAL PHASE
Delayed pharyngeal swallow/Decreased laryngeal elevation Delayed pharyngeal swallow/Throat clear post oral intake/Decreased laryngeal elevation Delayed pharyngeal swallow/Decreased laryngeal elevation/slight wet vocal quality post limited trials

## 2019-07-26 NOTE — PROGRESS NOTE ADULT - ASSESSMENT
A/P:74F admitted with abdominal pain and exam findings consistent with peritonitis secondary to acute diverticulitis at the splenic flexure with evidence of contained perforation now s/p Exploratory laparotomy, left hemicolectomy, end colostomy, and ABThera Vac placement and s/p from RTOR for abdominal washout and Abthera vac placement, off pressors. TPN started 7/5 for Protein-Calorie Malnutrition    - TPN goal: Carbohydrates: 210grams, Amino Acid: 120grams, 40g lipids decreased from 2000 to 1200 ml      TPN stopped as pt tolerating / eating more - Dysphagia I DM      ENCOURAGE PO- d/w daughter  - Elevated WBC improved- fluid Cx and UA pending      Continue to monitor BASIM output      ID COnsult noted  - Strict Intake and Output.,   - Weights three times a week  - Monitor BMP, Mg, Ionized Ca, Phosphorus daily  - Pre-albumin weekly.  - Hyperglycemia- Continue FS w/basal & sliding scale coverage.   - Hyperkalemia -stabilized  - HyperNa- stabilized  - Anemia- pt is a [employer]- continue mngt per primary team, Hematology note appreciated       Improving with supplementation  - Continue as per Surgery d/w team, will follow with you    TPN team, pager [phone], spectra [phone]  D/w Adela Cross

## 2019-07-26 NOTE — PROGRESS NOTE ADULT - SUBJECTIVE AND OBJECTIVE BOX
Patient is a 74y old  Female who presents with a chief complaint of Perforated diverticulitis (26 Jul 2019 04:15)    SUBJECTIVE / OVERNIGHT EVENTS:  Patient s/p 5 consecutive days of Procrit and has since received 2 doses of QOD Procrit. Her Hgb noted to improve to 9.4 this AM. Patient seen and examined. Patient reports that she feels fatigued overall from the prolonged hospitalization. Her abdominal pain is grossly stable. No chest pain or shortness of breath. Patient was afebrile overnight.    MEDICATIONS  (STANDING):  ALBUTerol/ipratropium for Nebulization 3 milliLiter(s) Nebulizer every 6 hours  apixaban 5 milliGRAM(s) Oral every 12 hours  buDESOnide    Inhalation Suspension 0.5 milliGRAM(s) Inhalation every 12 hours  chlorhexidine 2% Cloths 1 Application(s) Topical daily  dextrose 5%. 1000 milliLiter(s) (50 mL/Hr) IV Continuous <Continuous>  dextrose 50% Injectable 12.5 Gram(s) IV Push once  dextrose 50% Injectable 25 Gram(s) IV Push once  dextrose 50% Injectable 25 Gram(s) IV Push once  enalapril 10 milliGRAM(s) Oral every 12 hours  epoetin dejuan Injectable 26040 Unit(s) SubCutaneous <User Schedule>  ferrous    sulfate 325 milliGRAM(s) Oral daily  furosemide    Tablet 40 milliGRAM(s) Oral every 24 hours  insulin glargine Injectable (LANTUS) 20 Unit(s) SubCutaneous at bedtime  insulin lispro (HumaLOG) corrective regimen sliding scale   SubCutaneous three times a day before meals  insulin lispro (HumaLOG) corrective regimen sliding scale   SubCutaneous at bedtime  montelukast 10 milliGRAM(s) Oral every 24 hours  nystatin    Suspension 258863 Unit(s) Oral two times a day  pantoprazole    Tablet 40 milliGRAM(s) Oral two times a day  simvastatin 20 milliGRAM(s) Oral at bedtime  spironolactone 25 milliGRAM(s) Oral daily    MEDICATIONS  (PRN):  acetaminophen   Tablet .. 650 milliGRAM(s) Oral every 6 hours PRN Mild Pain (1 - 3)  dextrose 40% Gel 15 Gram(s) Oral once PRN Blood Glucose LESS THAN 70 milliGRAM(s)/deciliter  glucagon  Injectable 1 milliGRAM(s) IntraMuscular once PRN Glucose LESS THAN 70 milligrams/deciliter  sodium chloride 0.65% Nasal 1 Spray(s) Both Nostrils daily PRN Nasal Congestion        CAPILLARY BLOOD GLUCOSE      POCT Blood Glucose.: 151 mg/dL (26 Jul 2019 08:08)  POCT Blood Glucose.: 146 mg/dL (25 Jul 2019 21:39)  POCT Blood Glucose.: 165 mg/dL (25 Jul 2019 16:50)  POCT Blood Glucose.: 169 mg/dL (25 Jul 2019 14:58)  POCT Blood Glucose.: 159 mg/dL (25 Jul 2019 12:14)    I&O's Summary    25 Jul 2019 07:01  -  26 Jul 2019 07:00  --------------------------------------------------------  IN: 360 mL / OUT: 1370 mL / NET: -1010 mL    Vital Signs Last 24 Hrs  T(C): 36.9 (26 Jul 2019 09:17), Max: 36.9 (26 Jul 2019 02:01)  T(F): 98.5 (26 Jul 2019 09:17), Max: 98.5 (26 Jul 2019 09:17)  HR: 105 (26 Jul 2019 09:17) (105 - 122)  BP: 113/74 (26 Jul 2019 09:17) (109/74 - 145/69)  BP(mean): --  RR: 16 (26 Jul 2019 09:17) (16 - 18)  SpO2: 97% (26 Jul 2019 09:17) (95% - 100%)    PHYSICAL EXAM:  GENERAL: NAD, Laying in bed  HEENT: NC/AT, Slightly dry mucous membranes  NECK: Supple  CHEST/LUNG: Grossly clear anteriorly, No wheeze appreciated  HEART: Slightly tachycardic, +S1/S2  ABDOMEN: + Wound vac in place, + RLQ ostomy w/ minimal stool output  EXTREMITIES: Improved B/L LE edema  NEUROLOGY: Awake and alert, Answering questions and following commands  SKIN: + Abdominal wound vac, Warm and dry  PSYCH: Calm    LABS:                        9.4    12.0  )-----------( 496      ( 26 Jul 2019 09:08 )             31.5     07-26    137  |  98  |  21  ----------------------------<  165<H>  4.2   |  23  |  0.57    Ca    8.6      26 Jul 2019 09:08  Phos  3.1     07-26  Mg     1.6     07-26                RADIOLOGY & ADDITIONAL TESTS:  Studies reviewed.

## 2019-07-26 NOTE — PROGRESS NOTE ADULT - SUBJECTIVE AND OBJECTIVE BOX
Interval Events:    Refused labs yesterday due to concerns about blood loss, pains of sticks.  Agreed to receive labs today but only from a phlebotomist.  ID consulted, recommended CT C/A/P with IV contrast to evaluate for intraabdominal abscess and to evaluate for PNA if leukocytosis worsening on AM labs    S: Patient doing well, denies fevers, chills, nausea, emesis, chest pain, SOB.  Pain well controlled.  Having ostomy function.    O: Vital Signs Last 24 Hrs  T(C): 36.9 (26 Jul 2019 02:01), Max: 36.9 (26 Jul 2019 02:01)  T(F): 98.4 (26 Jul 2019 02:01), Max: 98.4 (26 Jul 2019 02:01)  HR: 122 (26 Jul 2019 02:01) (102 - 122)  BP: 109/74 (26 Jul 2019 02:01) (108/70 - 145/69)  BP(mean): --  RR: 18 (26 Jul 2019 02:01) (18 - 18)  SpO2: 97% (26 Jul 2019 02:01) (96% - 100%)      24 Jul 2019 07:01  -  25 Jul 2019 07:00  --------------------------------------------------------  IN:    fat emulsion (Fish Oil and Plant Based) 20% Infusion: 75 mL    Oral Fluid: 290 mL    TPN (Total Parenteral Nutrition): 297 mL  Total IN: 662 mL    OUT:    Colostomy: 50 mL    Drain: 40 mL    Voided: 1625 mL  Total OUT: 1715 mL    Total NET: -1053 mL      25 Jul 2019 07:01  -  26 Jul 2019 04:15  --------------------------------------------------------  IN:    Oral Fluid: 240 mL  Total IN: 240 mL    OUT:    Drain: 5 mL    Voided: 1200 mL  Total OUT: 1205 mL    Total NET: -965 mL          Physical Exam:    Gen: Well-developed, well-nourished in no acute distress  Resp: Clear to auscultation bilaterally with no wheezes, rale, or rhonchi  CV: Regular rate and rhythm with no murmur, gallop, or rub  GI: Soft, non-tender, non-distended with normoactive bowel sounds.  No masses.  Vac in place on midline with good suction.  remaining gurdeep drain in place with milky, possibly purulent drainage.  MSK: Moves all extremities equally  Skin: No rashes  Labs:                        7.9    14.7  )-----------( 420      ( 24 Jul 2019 11:23 )             26.0     24 Jul 2019 11:23    135    |  101    |  25     ----------------------------<  201    4.3     |  22     |  0.51     Ca    8.6        24 Jul 2019 11:23  Phos  3.3       24 Jul 2019 11:23  Mg     1.6       24 Jul 2019 11:23    TPro  7.5    /  Alb  2.6    /  TBili  0.3    /  DBili  <0.1   /  AST  29     /  ALT  24     /  AlkPhos  141    24 Jul 2019 11:23      CAPILLARY BLOOD GLUCOSE      POCT Blood Glucose.: 146 mg/dL (25 Jul 2019 21:39)  POCT Blood Glucose.: 165 mg/dL (25 Jul 2019 16:50)  POCT Blood Glucose.: 169 mg/dL (25 Jul 2019 14:58)  POCT Blood Glucose.: 159 mg/dL (25 Jul 2019 12:14)  POCT Blood Glucose.: 185 mg/dL (25 Jul 2019 08:36)        LIVER FUNCTIONS - ( 24 Jul 2019 11:23 )  Alb: 2.6 g/dL / Pro: 7.5 g/dL / ALK PHOS: 141 U/L / ALT: 24 U/L / AST: 29 U/L / GGT: x             Culture - Urine (collected 24 Jul 2019 01:06)  Source: .Urine  Final Report (25 Jul 2019 01:28):    No growth    Culture - Body Fluid with Gram Stain (collected 23 Jul 2019 13:26)  Source: .Body Fluid  Gram Stain (23 Jul 2019 14:33):    Numerous polymorphonuclear leukocytes seen per low power field    Numerous Gram Negative Rods seen per oil power field  Preliminary Report (25 Jul 2019 10:50):    Numerous Escherichia coli    Few Coag Negative Staphylococcus    Numerous Escherichia coli #2  Organism: Escherichia coli  Escherichia coli (25 Jul 2019 10:49)  Organism: Escherichia coli (25 Jul 2019 10:49)  Organism: Escherichia coli (25 Jul 2019 10:45)          MEDICATIONS  (STANDING):  ALBUTerol/ipratropium for Nebulization 3 milliLiter(s) Nebulizer every 6 hours  apixaban 5 milliGRAM(s) Oral every 12 hours  buDESOnide    Inhalation Suspension 0.5 milliGRAM(s) Inhalation every 12 hours  chlorhexidine 2% Cloths 1 Application(s) Topical daily  dextrose 5%. 1000 milliLiter(s) (50 mL/Hr) IV Continuous <Continuous>  dextrose 50% Injectable 12.5 Gram(s) IV Push once  dextrose 50% Injectable 25 Gram(s) IV Push once  dextrose 50% Injectable 25 Gram(s) IV Push once  enalapril 10 milliGRAM(s) Oral every 12 hours  epoetin dejuan Injectable 45456 Unit(s) SubCutaneous <User Schedule>  ferrous    sulfate 325 milliGRAM(s) Oral daily  furosemide    Tablet 40 milliGRAM(s) Oral every 24 hours  insulin glargine Injectable (LANTUS) 20 Unit(s) SubCutaneous at bedtime  insulin lispro (HumaLOG) corrective regimen sliding scale   SubCutaneous three times a day before meals  insulin lispro (HumaLOG) corrective regimen sliding scale   SubCutaneous at bedtime  montelukast 10 milliGRAM(s) Oral every 24 hours  nystatin    Suspension 548466 Unit(s) Oral two times a day  pantoprazole    Tablet 40 milliGRAM(s) Oral two times a day  simvastatin 20 milliGRAM(s) Oral at bedtime  spironolactone 25 milliGRAM(s) Oral daily    MEDICATIONS  (PRN):  acetaminophen   Tablet .. 650 milliGRAM(s) Oral every 6 hours PRN Mild Pain (1 - 3)  dextrose 40% Gel 15 Gram(s) Oral once PRN Blood Glucose LESS THAN 70 milliGRAM(s)/deciliter  glucagon  Injectable 1 milliGRAM(s) IntraMuscular once PRN Glucose LESS THAN 70 milligrams/deciliter  sodium chloride 0.65% Nasal 1 Spray(s) Both Nostrils daily PRN Nasal Congestion

## 2019-07-26 NOTE — SWALLOW BEDSIDE ASSESSMENT ADULT - SWALLOW EVAL: DIAGNOSIS
Pt continues to present with evidence Pt continues to present with evidence of oropharyngeal dysphagia. Oral stage is marked by prolonged mastication with reduced AP transport. Suspected delay in swallow initiation and reduced laryngeal elevation upon palpation. Immediate throat clears post mechanical soft trials and slight throat clears post thin liquids, suggestive of penetration/aspiration. No overt signs/symptoms of penetration/aspiration on puree and honey thickened liquids. Suggest MBS to objectively assess swallow

## 2019-07-26 NOTE — SWALLOW BEDSIDE ASSESSMENT ADULT - ASR SWALLOW ASPIRATION MONITOR
fever/pneumonia/throat clearing/change of breathing pattern/upper respiratory infection/gurgly voice/cough
throat clearing/gurgly voice/change of breathing pattern/upper respiratory infection/cough/fever/pneumonia
fever/upper respiratory infection/change of breathing pattern/cough/gurgly voice/throat clearing/pneumonia

## 2019-07-26 NOTE — PROGRESS NOTE ADULT - ASSESSMENT
Patient is a 73 y/o F ([employer]) w/ HFrEF s/p CRT-D, Asthma, HTN, HLD, and A-fib on Eliquis who initially p/w severe, sudden onset L-sided abdominal pain and was found to have a proximal descending colon/splenic flexure acute diverticulitis with evidence of contained perforation s/p exploratory laparotomy, left hemicolectomy, end colostomy, and ABThera VAC placement on 7/1, s/p abdominal washout x3 (last 7/9) and return to OR on 7/11 for abdominal closure and Vac placement w/ hospital course c/b septic shock 2/2 perforated diverticulitis/feculent peritonitis, protein-calorie malnutrition requiring TPN, and new anemia. Hematology consulted for further evaluation.      *** NOTE INCOMPLETE *** Patient is a 75 y/o F ([employer]) w/ HFrEF s/p CRT-D, Asthma, HTN, HLD, and A-fib on Eliquis who initially p/w severe, sudden onset L-sided abdominal pain and was found to have a proximal descending colon/splenic flexure acute diverticulitis with evidence of contained perforation s/p exploratory laparotomy, left hemicolectomy, end colostomy, and ABThera VAC placement on 7/1, s/p abdominal washout x3 (last 7/9) and return to OR on 7/11 for abdominal closure and Vac placement w/ hospital course c/b septic shock 2/2 perforated diverticulitis/feculent peritonitis, protein-calorie malnutrition requiring TPN, and new anemia. Hematology consulted for further evaluation.    # Anemia - Improved  - Likely multifactorial in the setting of septic shock, recent surgeries, and malnutrition.   - Iron studies were c/w AoCD (though patient had recent IV iron prior). Vitamin B12 and Folate were WNL. Reticulocyte index indicates adequate response.   - Patient's H/H initially downtrended during her hospitalization and it then remained relatively stable (7s-8s). Patient s/p course of IV Iron. She also received 5 days of daily Procrit and is currently receiving Procrit every other day. Patient's Hgb noted to improve to 9.4 this AM. Recommend changing frequency of Procrit to once per day on Mon/Wed/Fri (20,000U SQ) with instructions to hold the dose for Hgb > 10   - Patient is a [employer] and does not accept blood products. Continue to use pediatric vials to obtain lab work and minimize blood draws as much as possible    - Continue to monitor CBC    Plan d/w primary team    Doug Miguel, PGY4  Hematology-Oncology Fellow  Pager: [phone]

## 2019-07-26 NOTE — SWALLOW BEDSIDE ASSESSMENT ADULT - SLP PERTINENT HISTORY OF CURRENT PROBLEM
74 F [employer] (cannot receive blood transfusions) with PMHx of HFrEF s/p AICD, asthma, HTN, HLD, atrial fibrillation on eliquis p/w severe, sudden onset L sided abd pain that started morning of 6/30. Patient had just finished her meal when she developed left sided abdominal pain. She reports nausea but no vomiting. Upon arrival to ED, patient with stable vitals. She received morphine for pain. Shortly after, +palpitations and SOB. She started to have wheezing and became hypoxic. Patient admitted to MICU for management of respiratory and heart failure.

## 2019-07-27 LAB
ANION GAP SERPL CALC-SCNC: 15 MMOL/L — SIGNIFICANT CHANGE UP (ref 5–17)
ANION GAP SERPL CALC-SCNC: 16 MMOL/L — SIGNIFICANT CHANGE UP (ref 5–17)
BUN SERPL-MCNC: 20 MG/DL — SIGNIFICANT CHANGE UP (ref 7–23)
BUN SERPL-MCNC: 22 MG/DL — SIGNIFICANT CHANGE UP (ref 7–23)
CALCIUM SERPL-MCNC: 8.5 MG/DL — SIGNIFICANT CHANGE UP (ref 8.4–10.5)
CALCIUM SERPL-MCNC: 8.5 MG/DL — SIGNIFICANT CHANGE UP (ref 8.4–10.5)
CHLORIDE SERPL-SCNC: 101 MMOL/L — SIGNIFICANT CHANGE UP (ref 96–108)
CHLORIDE SERPL-SCNC: 98 MMOL/L — SIGNIFICANT CHANGE UP (ref 96–108)
CO2 SERPL-SCNC: 17 MMOL/L — LOW (ref 22–31)
CO2 SERPL-SCNC: 22 MMOL/L — SIGNIFICANT CHANGE UP (ref 22–31)
CREAT SERPL-MCNC: 0.59 MG/DL — SIGNIFICANT CHANGE UP (ref 0.5–1.3)
CREAT SERPL-MCNC: 0.63 MG/DL — SIGNIFICANT CHANGE UP (ref 0.5–1.3)
GLUCOSE BLDC GLUCOMTR-MCNC: 115 MG/DL — HIGH (ref 70–99)
GLUCOSE BLDC GLUCOMTR-MCNC: 125 MG/DL — HIGH (ref 70–99)
GLUCOSE BLDC GLUCOMTR-MCNC: 165 MG/DL — HIGH (ref 70–99)
GLUCOSE BLDC GLUCOMTR-MCNC: 183 MG/DL — HIGH (ref 70–99)
GLUCOSE SERPL-MCNC: 124 MG/DL — HIGH (ref 70–99)
GLUCOSE SERPL-MCNC: 168 MG/DL — HIGH (ref 70–99)
HCT VFR BLD CALC: 30.2 % — LOW (ref 34.5–45)
HGB BLD-MCNC: 9.7 G/DL — LOW (ref 11.5–15.5)
MAGNESIUM SERPL-MCNC: 1.7 MG/DL — SIGNIFICANT CHANGE UP (ref 1.6–2.6)
MCHC RBC-ENTMCNC: 32.2 GM/DL — SIGNIFICANT CHANGE UP (ref 32–36)
MCHC RBC-ENTMCNC: 34 PG — SIGNIFICANT CHANGE UP (ref 27–34)
MCV RBC AUTO: 106 FL — HIGH (ref 80–100)
PHOSPHATE SERPL-MCNC: 3.1 MG/DL — SIGNIFICANT CHANGE UP (ref 2.5–4.5)
PLATELET # BLD AUTO: 508 K/UL — HIGH (ref 150–400)
POTASSIUM SERPL-MCNC: 3.9 MMOL/L — SIGNIFICANT CHANGE UP (ref 3.5–5.3)
POTASSIUM SERPL-MCNC: 6 MMOL/L — HIGH (ref 3.5–5.3)
POTASSIUM SERPL-SCNC: 3.9 MMOL/L — SIGNIFICANT CHANGE UP (ref 3.5–5.3)
POTASSIUM SERPL-SCNC: 6 MMOL/L — HIGH (ref 3.5–5.3)
RBC # BLD: 2.86 M/UL — LOW (ref 3.8–5.2)
RBC # FLD: 19.5 % — HIGH (ref 10.3–14.5)
SODIUM SERPL-SCNC: 134 MMOL/L — LOW (ref 135–145)
SODIUM SERPL-SCNC: 135 MMOL/L — SIGNIFICANT CHANGE UP (ref 135–145)
WBC # BLD: 14.6 K/UL — HIGH (ref 3.8–10.5)
WBC # FLD AUTO: 14.6 K/UL — HIGH (ref 3.8–10.5)

## 2019-07-27 PROCEDURE — 74230 X-RAY XM SWLNG FUNCJ C+: CPT | Mod: 26

## 2019-07-27 PROCEDURE — 99232 SBSQ HOSP IP/OBS MODERATE 35: CPT

## 2019-07-27 RX ADMIN — Medication 0.5 MILLIGRAM(S): at 17:34

## 2019-07-27 RX ADMIN — Medication 2: at 12:46

## 2019-07-27 RX ADMIN — Medication 10 MILLIGRAM(S): at 06:07

## 2019-07-27 RX ADMIN — MONTELUKAST 10 MILLIGRAM(S): 4 TABLET, CHEWABLE ORAL at 12:46

## 2019-07-27 RX ADMIN — Medication 40 MILLIGRAM(S): at 06:07

## 2019-07-27 RX ADMIN — Medication 0.5 MILLIGRAM(S): at 06:07

## 2019-07-27 RX ADMIN — INSULIN GLARGINE 20 UNIT(S): 100 INJECTION, SOLUTION SUBCUTANEOUS at 21:59

## 2019-07-27 RX ADMIN — APIXABAN 5 MILLIGRAM(S): 2.5 TABLET, FILM COATED ORAL at 06:07

## 2019-07-27 RX ADMIN — CHLORHEXIDINE GLUCONATE 1 APPLICATION(S): 213 SOLUTION TOPICAL at 08:50

## 2019-07-27 RX ADMIN — Medication 2: at 18:15

## 2019-07-27 RX ADMIN — Medication 3 MILLILITER(S): at 22:00

## 2019-07-27 RX ADMIN — PANTOPRAZOLE SODIUM 40 MILLIGRAM(S): 20 TABLET, DELAYED RELEASE ORAL at 17:34

## 2019-07-27 RX ADMIN — PANTOPRAZOLE SODIUM 40 MILLIGRAM(S): 20 TABLET, DELAYED RELEASE ORAL at 06:07

## 2019-07-27 RX ADMIN — SIMVASTATIN 20 MILLIGRAM(S): 20 TABLET, FILM COATED ORAL at 22:00

## 2019-07-27 RX ADMIN — SPIRONOLACTONE 25 MILLIGRAM(S): 25 TABLET, FILM COATED ORAL at 06:07

## 2019-07-27 RX ADMIN — Medication 500000 UNIT(S): at 17:34

## 2019-07-27 RX ADMIN — Medication 500000 UNIT(S): at 06:08

## 2019-07-27 RX ADMIN — Medication 325 MILLIGRAM(S): at 12:46

## 2019-07-27 RX ADMIN — Medication 3 MILLILITER(S): at 17:35

## 2019-07-27 RX ADMIN — APIXABAN 5 MILLIGRAM(S): 2.5 TABLET, FILM COATED ORAL at 17:34

## 2019-07-27 RX ADMIN — Medication 3 MILLILITER(S): at 06:07

## 2019-07-27 NOTE — SWALLOW VFSS/MBS ASSESSMENT ADULT - DELAYED INITIATION OF THE PHARYNGEAL SWALLOW (IN SECONDS)
3.1 seconds; initiated at the pyriforms 1.1; initiated at the [facility] for texture 1.5 seconds; initiated at the valleculae 2.4 seconds; initiated at the valleculae

## 2019-07-27 NOTE — PROGRESS NOTE ADULT - ASSESSMENT
A/P: 75 yo Female with PMH of asthma, A fib, HFrEF with perforated diverticulitis s/p L hemicolectomy and end colostomy with VAC placement along with multiple washouts and closure with bridging mesh.  Doing well, tolerating dysphagia diet.  Leukocytosis improved, remains afebrile.    - Continue Chest PT  - Continue PO diet and f/u calorie count  - MBS today with Speech/Swallow  - Heme recs appreciated: procrit MWF, hold for Hgb >10  - ID following, will appreciate recs and notify if new leukocytosis  - F/U with Plastics as outpatient for graft and definitive closure    [facility], [phone]

## 2019-07-27 NOTE — PROGRESS NOTE ADULT - SUBJECTIVE AND OBJECTIVE BOX
Interval Events: wound vac changed yesterday, re-evaluated by speech/swallow     S: Patient doing well, denies fevers, chills, nausea, emesis, chest pain, SOB. No abdominal pain. Tolerating dysphagia diet with improved ostomy function.     O: Vital Signs  T(C): 36.4 (07-27 @ 01:13), Max: 36.9 (07-26 @ 09:17)  HR: 108 (07-27 @ 01:13) (100 - 120)  BP: 121/80 (07-27 @ 06:04) (106/66 - 131/84)  RR: 17 (07-27 @ 06:04) (16 - 18)  SpO2: 98% (07-27 @ 06:04) (97% - 99%)  07-25-19 @ 07:01  -  07-26-19 @ 07:00  --------------------------------------------------------  IN: 360 mL / OUT: 1370 mL / NET: -1010 mL    07-26-19 @ 07:01  -  07-27-19 @ 06:38  --------------------------------------------------------  IN: 530 mL / OUT: 1220 mL / NET: -690 mL      General: alert and oriented, NAD  Resp: airway patent, respirations unlabored  CVS: regular rate and rhythm  Abdomen: soft, nontender, nondistended, wound vac in place with good suction, BASIM with serous output, stoma pink with liquid stool and gas in bag  Extremities: no edema  Skin: warm, dry, appropriate color                          9.4    12.0  )-----------( 496      ( 26 Jul 2019 09:08 )             31.5   07-26    137  |  98  |  21  ----------------------------<  165<H>  4.2   |  23  |  0.57    Ca    8.6      26 Jul 2019 09:08  Phos  3.1     07-26  Mg     1.6     07-26

## 2019-07-27 NOTE — PROGRESS NOTE ADULT - ASSESSMENT
A/P:74F admitted with abdominal pain and exam findings consistent with peritonitis secondary to acute diverticulitis at the splenic flexure with evidence of contained perforation s/p Exploratory laparotomy, left hemicolectomy, end colostomy, and ABThera Vac placement, RTOR for abdominal washout and Abthera vac placement. TPN started 7/5 for Protein-Calorie Malnutrition, TPN stopped 7/24    - f/u calorie count  - no plan for TPN, can remove PICC from TPN team perspective  - if unable to maintain nutritional needs via PO intake, consider nocturnal NG feeds since pt has a functioning GI tract    TPN pager [phone], spectra [phone]  discussed with Dr. Huggins and Dr. Cross

## 2019-07-27 NOTE — CHART NOTE - NSCHARTNOTEFT_GEN_A_CORE
Called at ~2am re: 13 beats of wide-complex tachycardia around 1am that spontaneously resolved.   Patient on tele monitor.  Evaluated patient at bedside.  Patient denied any feelings of chest discomfort, heart racing, shortness of breath, light headedness, n/v.  Per RN, tele reports that this has occurred in the past and is not abnormal for this patient.      ** PHYSICAL EXAM **    Gen: NAD, lying comfortably in bed  HEENT: NC/AT  RESP: nonlabored breathing      ** VITAL SIGNS / I&O's **    Vital Signs Last 24 Hrs  T(C): 36.4 (27 Jul 2019 01:13), Max: 36.9 (26 Jul 2019 09:17)  T(F): 97.5 (27 Jul 2019 01:13), Max: 98.5 (26 Jul 2019 09:17)  HR: 108 (27 Jul 2019 01:13) (100 - 120)  BP: 109/74 (27 Jul 2019 01:13) (106/66 - 131/84)  RR: 17 (27 Jul 2019 01:13) (16 - 18)  SpO2: 98% (27 Jul 2019 01:13) (95% - 99%)    Reviewed historical vitals as available - patient appears to be within her normal range.     At this time, patient appears to be hemodynamically stable.  Discussed with du resident on call.  Will continue to monitor for repeat occurrence or if patient becomes symptomatic, but for now, no active intervention required.

## 2019-07-27 NOTE — SWALLOW VFSS/MBS ASSESSMENT ADULT - DIAGNOSTIC IMPRESSIONS
Pt presents with an oropharyngeal and esophageal dysphagia. Oral stage is marked by prolonged mastication with reduced AP transport with hard solids. Mild repetitive lingual movements with delayed oral transit time and mod oral residue with hard solids due to reduced lingual strength/control. Premature loss of liquid bolus over BOT. Delayed swallow initiation at the level of the valleculae with honey and thin liquids and one delayed initiation at the pyriforms with nectar thickened liquids on consecutive swallows. Shallow penetration over the laryngeal surface of the epiglottis with thin liquids without complete retrieval. A chin tuck posture was not effective in eliminating penetration. Mild pharyngeal stasis with solids is effectively cleared with a cued secondary swallow. No aspiration observed under fluoroscopy. Retention of material is noted in the proximal esophagus. If clinically warranted - suggest GI consult to further asses esophageal stage.    Disorders: reduced lingual strength/ROM/Rate of motion, reduced BOT to posterior pharyngeal wall contact , delay in trigger of the swallow reflex, reduced laryngeal elevation, incomplete laryngeal vestibule closure, reduced supraglottic sensation, Etiology of esophageal dysphagia is unclear - suggest GI consult for assessment.

## 2019-07-27 NOTE — SWALLOW VFSS/MBS ASSESSMENT ADULT - ORAL PHASE
Uncontrolled bolus / spillover in arpita-pharynx/Residue in oral cavity/Incomplete tongue to palate contact Residue in oral cavity/Incomplete tongue to palate contact/Uncontrolled bolus / spillover in arpita-pharynx Residue in oral cavity/Delayed oral transit time/Reduced anterior - posterior transport Uncontrolled bolus / spillover in arpita-pharynx/Incomplete tongue to palate contact Uncontrolled bolus / spillover in arpita-pharynx/Delayed oral transit time/Reduced anterior - posterior transport/Residue in oral cavity/Incomplete tongue to palate contact Residue in oral cavity

## 2019-07-27 NOTE — SWALLOW VFSS/MBS ASSESSMENT ADULT - SLP GENERAL OBSERVATIONS
Pt encountered upright, secured in GARTH chair in radiology. Pt AA&Ox4. Pt able to follow simple directives for purposes of test. No c/o pain/distress.

## 2019-07-27 NOTE — PROGRESS NOTE ADULT - ATTENDING COMMENTS
I saw the pt and discussed the tx plan with the House Staff. I agree with the exam and plan as documented in the surgery resident's note from today.  Tracy Jay MD

## 2019-07-27 NOTE — SWALLOW VFSS/MBS ASSESSMENT ADULT - SLP PERTINENT HISTORY OF CURRENT PROBLEM
74 F Jain (cannot receive blood transfusions) with PMHx of HFrEF s/p AICD, asthma, HTN, HLD, atrial fibrillation on eliquis p/w severe, sudden onset L sided abd pain that started morning of 6/30. Patient had just finished her meal when she developed left sided abdominal pain. She reports nausea but no vomiting. Upon arrival to ED, patient with stable vitals. She received morphine for pain. Shortly after, +palpitations and SOB. She started to have wheezing and became hypoxic. Patient admitted to MICU for management of respiratory and heart failure.

## 2019-07-27 NOTE — SWALLOW VFSS/MBS ASSESSMENT ADULT - ESOPHAGEAL STAGE
+mild residue in the proximal esophagus +mild retention in proximal esophagus +mild-mod retention in proximal esophagus +trace retention in proximal esophagus  +mod distention of proximal esophagus

## 2019-07-27 NOTE — SWALLOW VFSS/MBS ASSESSMENT ADULT - RECOMMENDED CONSISTENCY
1)Dysphagia II with nectar thickened liquids 2)Aspiration precautions; small bites, sips, slow rate; 2 SWALLOWS PER BITE; alternate food and liquids; check mouth frequently throughout and after meal; position upright; allow rest breaks during meal

## 2019-07-27 NOTE — SWALLOW VFSS/MBS ASSESSMENT ADULT - ORAL PHASE COMMENTS
mod-max spillover to the valleculae; BRANDEN 13.5 seconds (2 swallows to clear oral cavity); max spillover to the valleculae; trace lingual dorsum residue Mild lingual dorsum and palatal residue Majority of mastication took place off of fluoro. On fluror BRANDEN 10.03 seconds - piecemeal deglutition; mod lingual stasis remains in oral cavity BRANDEN 5.7 seconds (2 swallows to clear bolus from oral cavity); mod spillover to the valleculae; mild lingual dorsum residue, trace palatal residue BRANDEN 4.8 seconds (2 swallows to clear bolus from oral cavity); Mod spillover to the valleculae; trace lingual dorsum residue

## 2019-07-28 LAB
ANION GAP SERPL CALC-SCNC: 14 MMOL/L — SIGNIFICANT CHANGE UP (ref 5–17)
BUN SERPL-MCNC: 24 MG/DL — HIGH (ref 7–23)
CALCIUM SERPL-MCNC: 8.4 MG/DL — SIGNIFICANT CHANGE UP (ref 8.4–10.5)
CHLORIDE SERPL-SCNC: 99 MMOL/L — SIGNIFICANT CHANGE UP (ref 96–108)
CO2 SERPL-SCNC: 22 MMOL/L — SIGNIFICANT CHANGE UP (ref 22–31)
CREAT SERPL-MCNC: 0.76 MG/DL — SIGNIFICANT CHANGE UP (ref 0.5–1.3)
CULTURE RESULTS: SIGNIFICANT CHANGE UP
GLUCOSE BLDC GLUCOMTR-MCNC: 133 MG/DL — HIGH (ref 70–99)
GLUCOSE BLDC GLUCOMTR-MCNC: 142 MG/DL — HIGH (ref 70–99)
GLUCOSE BLDC GLUCOMTR-MCNC: 155 MG/DL — HIGH (ref 70–99)
GLUCOSE BLDC GLUCOMTR-MCNC: 213 MG/DL — HIGH (ref 70–99)
GLUCOSE SERPL-MCNC: 176 MG/DL — HIGH (ref 70–99)
HCT VFR BLD CALC: 32.4 % — LOW (ref 34.5–45)
HGB BLD-MCNC: 9.6 G/DL — LOW (ref 11.5–15.5)
MAGNESIUM SERPL-MCNC: 1.7 MG/DL — SIGNIFICANT CHANGE UP (ref 1.6–2.6)
MCHC RBC-ENTMCNC: 29.5 GM/DL — LOW (ref 32–36)
MCHC RBC-ENTMCNC: 31 PG — SIGNIFICANT CHANGE UP (ref 27–34)
MCV RBC AUTO: 105 FL — HIGH (ref 80–100)
ORGANISM # SPEC MICROSCOPIC CNT: SIGNIFICANT CHANGE UP
PHOSPHATE SERPL-MCNC: 2.4 MG/DL — LOW (ref 2.5–4.5)
PLATELET # BLD AUTO: 537 K/UL — HIGH (ref 150–400)
POTASSIUM SERPL-MCNC: 3.7 MMOL/L — SIGNIFICANT CHANGE UP (ref 3.5–5.3)
POTASSIUM SERPL-SCNC: 3.7 MMOL/L — SIGNIFICANT CHANGE UP (ref 3.5–5.3)
RBC # BLD: 3.08 M/UL — LOW (ref 3.8–5.2)
RBC # FLD: 19.2 % — HIGH (ref 10.3–14.5)
SODIUM SERPL-SCNC: 135 MMOL/L — SIGNIFICANT CHANGE UP (ref 135–145)
SPECIMEN SOURCE: SIGNIFICANT CHANGE UP
WBC # BLD: 15.4 K/UL — HIGH (ref 3.8–10.5)
WBC # FLD AUTO: 15.4 K/UL — HIGH (ref 3.8–10.5)

## 2019-07-28 PROCEDURE — 71260 CT THORAX DX C+: CPT | Mod: 26

## 2019-07-28 PROCEDURE — 99233 SBSQ HOSP IP/OBS HIGH 50: CPT

## 2019-07-28 PROCEDURE — 74177 CT ABD & PELVIS W/CONTRAST: CPT | Mod: 26

## 2019-07-28 RX ORDER — VANCOMYCIN HCL 1 G
1250 VIAL (EA) INTRAVENOUS EVERY 12 HOURS
Refills: 0 | Status: DISCONTINUED | OUTPATIENT
Start: 2019-07-28 | End: 2019-07-31

## 2019-07-28 RX ORDER — MEROPENEM 1 G/30ML
1000 INJECTION INTRAVENOUS EVERY 8 HOURS
Refills: 0 | Status: DISCONTINUED | OUTPATIENT
Start: 2019-07-28 | End: 2019-08-01

## 2019-07-28 RX ADMIN — Medication 40 MILLIGRAM(S): at 05:13

## 2019-07-28 RX ADMIN — PANTOPRAZOLE SODIUM 40 MILLIGRAM(S): 20 TABLET, DELAYED RELEASE ORAL at 05:17

## 2019-07-28 RX ADMIN — INSULIN GLARGINE 20 UNIT(S): 100 INJECTION, SOLUTION SUBCUTANEOUS at 21:41

## 2019-07-28 RX ADMIN — Medication 3 MILLILITER(S): at 05:14

## 2019-07-28 RX ADMIN — Medication 0.5 MILLIGRAM(S): at 18:28

## 2019-07-28 RX ADMIN — Medication 2: at 08:00

## 2019-07-28 RX ADMIN — PANTOPRAZOLE SODIUM 40 MILLIGRAM(S): 20 TABLET, DELAYED RELEASE ORAL at 18:28

## 2019-07-28 RX ADMIN — Medication 166.67 MILLIGRAM(S): at 20:12

## 2019-07-28 RX ADMIN — MEROPENEM 100 MILLIGRAM(S): 1 INJECTION INTRAVENOUS at 21:42

## 2019-07-28 RX ADMIN — CHLORHEXIDINE GLUCONATE 1 APPLICATION(S): 213 SOLUTION TOPICAL at 11:23

## 2019-07-28 RX ADMIN — Medication 500000 UNIT(S): at 18:28

## 2019-07-28 RX ADMIN — APIXABAN 5 MILLIGRAM(S): 2.5 TABLET, FILM COATED ORAL at 05:13

## 2019-07-28 RX ADMIN — SIMVASTATIN 20 MILLIGRAM(S): 20 TABLET, FILM COATED ORAL at 21:41

## 2019-07-28 RX ADMIN — Medication 500000 UNIT(S): at 05:14

## 2019-07-28 RX ADMIN — Medication 0.5 MILLIGRAM(S): at 05:14

## 2019-07-28 RX ADMIN — MONTELUKAST 10 MILLIGRAM(S): 4 TABLET, CHEWABLE ORAL at 18:27

## 2019-07-28 RX ADMIN — SPIRONOLACTONE 25 MILLIGRAM(S): 25 TABLET, FILM COATED ORAL at 05:13

## 2019-07-28 RX ADMIN — APIXABAN 5 MILLIGRAM(S): 2.5 TABLET, FILM COATED ORAL at 18:27

## 2019-07-28 RX ADMIN — Medication 325 MILLIGRAM(S): at 18:27

## 2019-07-28 RX ADMIN — Medication 10 MILLIGRAM(S): at 05:13

## 2019-07-28 RX ADMIN — Medication 10 MILLIGRAM(S): at 18:27

## 2019-07-28 NOTE — PROGRESS NOTE ADULT - ATTENDING COMMENTS
I have seen and examined the patient.  I agree with the surgical resident's note.  I looked at the pictues from the last VAC change - no granulation    Ariel Chambers contact information [phone]

## 2019-07-28 NOTE — PROGRESS NOTE ADULT - SUBJECTIVE AND OBJECTIVE BOX
Interval Events: MBS for speech&swallow eval, advanced to Dysphagia 2 - [place] thick    S: Patient doing well, denies fevers, chills, nausea, emesis, chest pain, SOB. Tolerating dysphagia diet with good ostomy function.    O: Vital Signs  T(C): 36.3 (07-28 @ 05:10), Max: 37 (07-27 @ 13:22)  HR: 114 (07-28 @ 05:10) (90 - 114)  BP: 127/80 (07-28 @ 05:10) (96/66 - 137/80)  RR: 17 (07-28 @ 05:10) (16 - 18)  SpO2: 98% (07-28 @ 05:10) (96% - 99%)  07-26-19 @ 07:01  -  07-27-19 @ 07:00  --------------------------------------------------------  IN: 530 mL / OUT: 1370 mL / NET: -840 mL    07-27-19 @ 07:01  -  07-28-19 @ 06:34  --------------------------------------------------------  IN: 640 mL / OUT: 960 mL / NET: -320 mL      General: alert and oriented, NAD  Resp: airway patent, respirations unlabored  CVS: regular rate and rhythm  Abdomen: soft, nontender, nondistended, wound vac in place with good suction, BASIM with thick white drainage in bulb, stoma pink with liquid stool and gas in bag  Extremities: no edema  Skin: warm, dry, appropriate color                          9.7    14.6  )-----------( 508      ( 27 Jul 2019 11:59 )             30.2   07-27    135  |  98  |  22  ----------------------------<  168<H>  3.9   |  22  |  0.63    Ca    8.5      27 Jul 2019 11:59  Phos  3.1     07-27  Mg     1.7     07-27

## 2019-07-28 NOTE — PROGRESS NOTE ADULT - ASSESSMENT
Ms. Dolan is a 74 year old woman with ACC/AHA stage C chronic systolic heart failure with LVEF of 45%, s/p CRT-D, and asthma with recurrent hospitalizations for acute respiratory failure. She is not tolerant of beta-blockers and has been resistant in the past to addition of Entresto. She presented with proximal descending colon/splenic flexure acute diverticulitis with contained perforation leading to exploratory laparotomy, left hemicolectomy, end colostomy, and ABTheraVAC placement on 7/1. Her hospital course was complicated by septic shock and abdominal washout x3 (last 7/9). She returned to OR 7/11 for abdominal closure and wound vac placement. She was extubated 7/12, now on PO medications and TPN. She is currently normotensive and euvolemic.

## 2019-07-28 NOTE — PROGRESS NOTE ADULT - ASSESSMENT
A/P: 75 yo Female with PMH of asthma, A fib, HFrEF with perforated diverticulitis s/p L hemicolectomy and end colostomy with VAC placement along with multiple washouts and closure with bridging mesh.  Doing well, tolerating dysphagia diet.  Leukocytosis (14 from 12), remains afebrile.    - Continue Chest PT  - Continue PO diet and f/u calorie count  - MBS today with Speech/Swallow  - Heme recs appreciated: procrit MWF, hold for Hgb >10  - ID following, will appreciate recs and notify if WBC continues uptrending  - F/U with Plastics as outpatient for graft and definitive closure    [facility], [phone]

## 2019-07-28 NOTE — PROGRESS NOTE ADULT - SUBJECTIVE AND OBJECTIVE BOX
Subjective: No overnight events. She complains of weakness and fatigue. No dyspnea and no dizziness.     Medications:  acetaminophen   Tablet .. 650 milliGRAM(s) Oral every 6 hours PRN  ALBUTerol/ipratropium for Nebulization 3 milliLiter(s) Nebulizer every 6 hours  apixaban 5 milliGRAM(s) Oral every 12 hours  buDESOnide    Inhalation Suspension 0.5 milliGRAM(s) Inhalation every 12 hours  enalapril 10 milliGRAM(s) Oral every 12 hours  epoetin dejuan Injectable 50887 Unit(s) SubCutaneous <User Schedule>  ferrous    sulfate 325 milliGRAM(s) Oral daily  furosemide    Tablet 40 milliGRAM(s) Oral every 24 hours  glucagon  Injectable 1 milliGRAM(s) IntraMuscular once PRN  insulin glargine Injectable (LANTUS) 20 Unit(s) SubCutaneous at bedtime  insulin lispro (HumaLOG) corrective regimen sliding scale   SubCutaneous three times a day before meals  insulin lispro (HumaLOG) corrective regimen sliding scale   SubCutaneous at bedtime  montelukast 10 milliGRAM(s) Oral every 24 hours  nystatin    Suspension 119508 Unit(s) Oral two times a day  pantoprazole    Tablet 40 milliGRAM(s) Oral two times a day  simvastatin 20 milliGRAM(s) Oral at bedtime  sodium chloride 0.65% Nasal 1 Spray(s) Both Nostrils daily PRN  spironolactone 25 milliGRAM(s) Oral daily      Physical Exam:    Vital Signs Last 24 Hrs  T(C): 36.8 (28 Jul 2019 17:08), Max: 36.9 (27 Jul 2019 21:55)  T(F): 98.3 (28 Jul 2019 17:08), Max: 98.4 (27 Jul 2019 21:55)  HR: 103 (28 Jul 2019 17:08) (103 - 114)  BP: 123/86 (28 Jul 2019 17:08) (101/68 - 130/80)  RR: 18 (28 Jul 2019 17:08) (16 - 18)  SpO2: 98% (28 Jul 2019 17:08) (97% - 99%)    Daily     I&O's Summary    27 Jul 2019 07:01  -  28 Jul 2019 07:00  --------------------------------------------------------  IN: 760 mL / OUT: 1060 mL / NET: -300 mL    28 Jul 2019 07:01  -  28 Jul 2019 17:59  --------------------------------------------------------  IN: 240 mL / OUT: 900 mL / NET: -660 mL    General: No distress. Comfortable.  HEENT: EOM intact.  Neck: Neck supple. JVP not elevated. No masses  Chest: Clear to auscultation bilaterally  CV: Normal S1 and S2. No murmurs, rub, or gallops. Radial pulses normal.  Abdomen: Soft  Skin: No rashes or skin breakdown  Neurology: Alert and oriented. Sensation intact  Psych: Affect flat    Labs:                        9.6    15.4  )-----------( 537      ( 28 Jul 2019 08:49 )             32.4     07-28    135  |  99  |  24<H>  ----------------------------<  176<H>  3.7   |  22  |  0.76    Ca    8.4      28 Jul 2019 08:49  Phos  2.4     07-28  Mg     1.7     07-28

## 2019-07-28 NOTE — CHART NOTE - NSCHARTNOTEFT_GEN_A_CORE
Patient with persistent leukocytosis over the past week. Continues to have cough. CT C/A/P performed today which has finding of LLL PNA. No intraabdominal collections. Would treat with Vancomycin / Meropenem. Would recommend sputum culture to help guide treatment.    EXAM:  CT ABDOMEN AND PELVIS OC IC                        EXAM:  CT CHEST IC                        PROCEDURE DATE:  07/28/2019    CT CHEST:   1.  Left lower lobe pneumonia .    CT ABDOMEN AND PELVIS:  1.  No bowel obstruction.  2.  Small amount of free fluid in the pelvis.    Recommendations  --Recommend Vancomycin 1.25g IV Q12H. Trough prior to fourth dose  --Recommend Meropenem 1g IV Q8H  --Recommend sputum cultures. Patient with persistent leukocytosis over the past week. Continues to have cough. CT C/A/P performed today which has finding of LLL PNA. No intraabdominal collections. Would treat with Vancomycin / Meropenem. Would recommend sputum culture to help guide treatment.    EXAM:  CT ABDOMEN AND PELVIS OC IC                        EXAM:  CT CHEST IC                        PROCEDURE DATE:  07/28/2019    CT CHEST:   1.  Left lower lobe pneumonia .    CT ABDOMEN AND PELVIS:  1.  No bowel obstruction.  2.  Small amount of free fluid in the pelvis.    Recommendations  --Recommend Vancomycin 1.25g IV Q12H. Trough prior to fourth dose  --Recommend Meropenem 1g IV Q8H  --Recommend sputum cultures.  --Official follow up to follow tomorrow Patient with persistent leukocytosis over the past week. Continues to have cough. CT C/A/P performed today which has finding of LLL PNA. No intraabdominal collections. Would treat with Vancomycin / Meropenem. Would recommend sputum culture to help guide treatment.    EXAM:  CT ABDOMEN AND PELVIS OC IC                        EXAM:  CT CHEST IC                        PROCEDURE DATE:  07/28/2019    CT CHEST:   1.  Left lower lobe pneumonia .    CT ABDOMEN AND PELVIS:  1.  No bowel obstruction.  2.  Small amount of free fluid in the pelvis.    Recommendations  --Recommend Vancomycin 1.25g IV Q12H. Trough prior to fourth dose  --Recommend Meropenem 1g IV Q8H  --Recommend sputum cultures.  --Official follow up to follow tomorrow    Discussed with surgery team     I will continue to follow. Please feel free to contact me with any further questions.    Rex Goode M.D.  [facility] Division of Infectious Disease  8AM-5PM: Pager Number [phone]  After Hours (or if no response): Please contact the Infectious Diseases Office at [phone]

## 2019-07-29 LAB
GLUCOSE BLDC GLUCOMTR-MCNC: 127 MG/DL — HIGH (ref 70–99)
GLUCOSE BLDC GLUCOMTR-MCNC: 169 MG/DL — HIGH (ref 70–99)
GLUCOSE BLDC GLUCOMTR-MCNC: 173 MG/DL — HIGH (ref 70–99)
GLUCOSE BLDC GLUCOMTR-MCNC: 195 MG/DL — HIGH (ref 70–99)
GRAM STN FLD: SIGNIFICANT CHANGE UP
SPECIMEN SOURCE: SIGNIFICANT CHANGE UP

## 2019-07-29 PROCEDURE — 99233 SBSQ HOSP IP/OBS HIGH 50: CPT | Mod: GC

## 2019-07-29 PROCEDURE — 99232 SBSQ HOSP IP/OBS MODERATE 35: CPT

## 2019-07-29 RX ORDER — ACETAMINOPHEN 500 MG
1000 TABLET ORAL ONCE
Refills: 0 | Status: COMPLETED | OUTPATIENT
Start: 2019-07-29 | End: 2019-07-29

## 2019-07-29 RX ADMIN — INSULIN GLARGINE 20 UNIT(S): 100 INJECTION, SOLUTION SUBCUTANEOUS at 22:21

## 2019-07-29 RX ADMIN — Medication 166.67 MILLIGRAM(S): at 20:13

## 2019-07-29 RX ADMIN — CHLORHEXIDINE GLUCONATE 1 APPLICATION(S): 213 SOLUTION TOPICAL at 10:51

## 2019-07-29 RX ADMIN — Medication 3 MILLILITER(S): at 22:21

## 2019-07-29 RX ADMIN — MEROPENEM 100 MILLIGRAM(S): 1 INJECTION INTRAVENOUS at 14:49

## 2019-07-29 RX ADMIN — Medication 1000 MILLIGRAM(S): at 09:36

## 2019-07-29 RX ADMIN — Medication 400 MILLIGRAM(S): at 08:56

## 2019-07-29 RX ADMIN — Medication 40 MILLIGRAM(S): at 06:25

## 2019-07-29 RX ADMIN — MEROPENEM 100 MILLIGRAM(S): 1 INJECTION INTRAVENOUS at 06:26

## 2019-07-29 RX ADMIN — Medication 325 MILLIGRAM(S): at 10:52

## 2019-07-29 RX ADMIN — ERYTHROPOIETIN 20000 UNIT(S): 10000 INJECTION, SOLUTION INTRAVENOUS; SUBCUTANEOUS at 10:47

## 2019-07-29 RX ADMIN — MEROPENEM 100 MILLIGRAM(S): 1 INJECTION INTRAVENOUS at 22:21

## 2019-07-29 RX ADMIN — APIXABAN 5 MILLIGRAM(S): 2.5 TABLET, FILM COATED ORAL at 06:26

## 2019-07-29 RX ADMIN — SIMVASTATIN 20 MILLIGRAM(S): 20 TABLET, FILM COATED ORAL at 22:21

## 2019-07-29 RX ADMIN — Medication 0.5 MILLIGRAM(S): at 06:26

## 2019-07-29 RX ADMIN — Medication 3 MILLILITER(S): at 10:51

## 2019-07-29 RX ADMIN — Medication 2: at 20:12

## 2019-07-29 RX ADMIN — APIXABAN 5 MILLIGRAM(S): 2.5 TABLET, FILM COATED ORAL at 18:37

## 2019-07-29 RX ADMIN — MONTELUKAST 10 MILLIGRAM(S): 4 TABLET, CHEWABLE ORAL at 10:51

## 2019-07-29 RX ADMIN — Medication 0.5 MILLIGRAM(S): at 20:13

## 2019-07-29 RX ADMIN — Medication 500000 UNIT(S): at 06:26

## 2019-07-29 RX ADMIN — SPIRONOLACTONE 25 MILLIGRAM(S): 25 TABLET, FILM COATED ORAL at 06:26

## 2019-07-29 RX ADMIN — PANTOPRAZOLE SODIUM 40 MILLIGRAM(S): 20 TABLET, DELAYED RELEASE ORAL at 06:25

## 2019-07-29 RX ADMIN — Medication 500000 UNIT(S): at 18:37

## 2019-07-29 RX ADMIN — Medication 3 MILLILITER(S): at 06:26

## 2019-07-29 RX ADMIN — Medication 2: at 14:49

## 2019-07-29 RX ADMIN — Medication 166.67 MILLIGRAM(S): at 10:47

## 2019-07-29 RX ADMIN — Medication 10 MILLIGRAM(S): at 18:36

## 2019-07-29 RX ADMIN — PANTOPRAZOLE SODIUM 40 MILLIGRAM(S): 20 TABLET, DELAYED RELEASE ORAL at 18:37

## 2019-07-29 RX ADMIN — Medication 10 MILLIGRAM(S): at 06:25

## 2019-07-29 NOTE — PROGRESS NOTE ADULT - ASSESSMENT
A/P: 73 yo Female with PMH of asthma, A fib, HFrEF with perforated diverticulitis s/p L hemicolectomy and end colostomy with VAC placement along with multiple washouts and closure with bridging mesh.  Doing well, tolerating dysphagia diet.  Leukocytosis (14 from 12), remains afebrile.    - Continue Chest PT  - Continue PO diet and f/u calorie count  - MBS today with Speech/Swallow  - Heme recs appreciated: procrit MWF, hold for Hgb >10  - ID following, will appreciate recs and notify if WBC continues uptrending  - F/U with Plastics as outpatient for graft and definitive closure    [facility], [phone] A/P: 75 yo Female with PMH of asthma, A fib, HFrEF with perforated diverticulitis s/p L hemicolectomy and end colostomy with VAC placement along with multiple washouts and closure with bridging mesh.  Doing well, tolerating dysphagia diet.  Leukocytosis (14 from 12), remains afebrile.    - Continue Chest PT  - Continue PO diet and f/u calorie count  - MBS today with Speech/Swallow  - Heme recs appreciated: procrit MWF, hold for Hgb >10  - F/U with Plastics as outpatient for graft and definitive closure  - F/u with cards for persistent tachy  - F/u ID recs and sputum culture  -D/c plan for rehab    [facility], [phone]

## 2019-07-29 NOTE — PROGRESS NOTE ADULT - ATTENDING COMMENTS
Feeling better  1.  Protein calorie malnutriton--enhanced PO.  Emphasized value of supplements(glucerna preferred)  2.  Hyperglycemia-- prior regimen pre hospitalization.  Optimize lantus, FS q6h

## 2019-07-29 NOTE — PROGRESS NOTE ADULT - SUBJECTIVE AND OBJECTIVE BOX
[facility] NUTRITION SUPPORT / TPN -- FOLLOW UP NOTE  --------------------------------------------------------------------------------    24 hour events/subjective:  - Pt more awake/ alert- doesn't like the taste of food- reminded her importance of eating  - Tolerating dysphagia diet, eating more- daughter wants to bring in food favorites to help encourage  - No acute events overnight  - more alert later in the day, no n/v/d  - no f/c/s  - no cp/ palp/ sob  - Pt on tele on 9monti  - Encouraged eating and oob to chair       Encourage ambulation and increased mobility/ pt being offloaded  - PT to follow- pt for MATT d/c        Diet:  Diet, Dysphagia 2 Mechanical Soft-Nectar Consistency Fluid (07-27-19 @ 14:43)      Appetite: [  ]Poor [x  ]Adequate [  ]Good  Caloric intake:  [ x  ]  Adequate   [   ] Inadequate    ROS: General/ GI see HPI  all other systems negative      ALLERGIES & MEDICATIONS  --------------------------------------------------------------------------------  ALLERGIES  Coreg (Other)  digoxin (Other; Short breath (Mild to Mod))  penicillins (Hives)    INTOLERANCES  metoprolol (Other)  Solu-Medrol (Other (Mild to Mod))    STANDING INPATIENT MEDICATIONS    ALBUTerol/ipratropium for Nebulization 3 milliLiter(s) Nebulizer every 6 hours  apixaban 5 milliGRAM(s) Oral every 12 hours  buDESOnide    Inhalation Suspension 0.5 milliGRAM(s) Inhalation every 12 hours  chlorhexidine 2% Cloths 1 Application(s) Topical daily  dextrose 50% Injectable 12.5 Gram(s) IV Push once  dextrose 50% Injectable 25 Gram(s) IV Push once  dextrose 50% Injectable 25 Gram(s) IV Push once  enalapril 10 milliGRAM(s) Oral every 12 hours  epoetin dejuan Injectable 79086 Unit(s) SubCutaneous <User Schedule>  ferrous    sulfate 325 milliGRAM(s) Oral daily  furosemide    Tablet 40 milliGRAM(s) Oral every 24 hours  insulin glargine Injectable (LANTUS) 20 Unit(s) SubCutaneous at bedtime  insulin lispro (HumaLOG) corrective regimen sliding scale   SubCutaneous three times a day before meals  insulin lispro (HumaLOG) corrective regimen sliding scale   SubCutaneous at bedtime  meropenem  IVPB 1000 milliGRAM(s) IV Intermittent every 8 hours  montelukast 10 milliGRAM(s) Oral every 24 hours  nystatin    Suspension 425453 Unit(s) Oral two times a day  pantoprazole    Tablet 40 milliGRAM(s) Oral two times a day  simvastatin 20 milliGRAM(s) Oral at bedtime  spironolactone 25 milliGRAM(s) Oral daily  vancomycin  IVPB 1250 milliGRAM(s) IV Intermittent every 12 hours      PRN INPATIENT MEDICATION  acetaminophen   Tablet .. 650 milliGRAM(s) Oral every 6 hours PRN  dextrose 40% Gel 15 Gram(s) Oral once PRN  glucagon  Injectable 1 milliGRAM(s) IntraMuscular once PRN  sodium chloride 0.65% Nasal 1 Spray(s) Both Nostrils daily PRN        VITALS/PHYSICAL EXAM  --------------------------------------------------------------------------------  T(C): 36.3 (07-29-19 @ 13:47), Max: 36.8 (07-28-19 @ 17:08)  HR: 97 (07-29-19 @ 14:23) (97 - 114)  BP: 102/65 (07-29-19 @ 14:23) (102/65 - 123/86)  RR: 18 (07-29-19 @ 14:23) (16 - 18)  SpO2: 95% (07-29-19 @ 14:23) (95% - 99%)  Wt(kg): --        07-28-19 @ 07:01  -  07-29-19 @ 07:00  --------------------------------------------------------  IN: 1240 mL / OUT: 1710 mL / NET: -470 mL    07-29-19 @ 07:01  -  07-29-19 @ 16:00  --------------------------------------------------------  IN: 120 mL / OUT: 300 mL / NET: -180 mL    Physical Exam:    	Gen: NAD, WD/ WN/ A&Ox3  	HEENT: NC/AT, PERRL, mucosa moist, supple neck, clear oropharynx  	Chest: clear  	GI: +softly distended, nontender, no BS                  (+)Ostomy pink & viable. liquid & soft formed stool                  VAC intact w/ good seal, drain w/ serous drainage              :  (+)Ching              MSK/Vascular: Passive ROM x4,  no clubbing, cyanosis, nor edema                   RUE PICC dressing C/D/I w/o SOI w/ no edema  	Neuro: Awake and alert  	Skin: Warm, good turgor, without rashes        LABS/ CULTURES/ RADIOLOGY:              9.6    15.4  >-----------<  537      [07-28-19 @ 08:49]              32.4     135  |  99  |  24  ----------------------------<  176      [07-28-19 @ 08:49]  3.7   |  22  |  0.76        Ca     8.4     [07-28-19 @ 08:49]      Mg     1.7     [07-28-19 @ 08:49]      Phos  2.4     [07-28-19 @ 08:49]        CAPILLARY BLOOD GLUCOSE    POCT Blood Glucose.: 169 mg/dL (29 Jul 2019 14:46)  POCT Blood Glucose.: 127 mg/dL (29 Jul 2019 10:26)  POCT Blood Glucose.: 213 mg/dL (28 Jul 2019 21:40)  POCT Blood Glucose.: 133 mg/dL (28 Jul 2019 19:14)    Prealbumin, Serum: 12 mg/dL (07-24-19 @ 14:57)  Prealbumin, Serum: 5 mg/dL (07-04-19 @ 22:45)

## 2019-07-29 NOTE — PROGRESS NOTE ADULT - SUBJECTIVE AND OBJECTIVE BOX
HEART FAILURE PROGRESS NOTE    INTERVAL EVENTS:  No overnight events.   Denies chest pain, palpitations, SOB, lightheadedness, dizziness.    Telemetry:    REVIEW OF SYSTEMS:  Constitutional: [ ] Fever [ ] Chills [ ] Fatigue [ ] Weight change   HEENT: [ ] Blurred vision [ ] Eye Pain [ ] Headache [ ] Runny nose [ ] Sore Throat   Respiratory: [ ] Cough [ ] Wheezing [ ] Shortness of breath  Cardiovascular: [ ] Chest Pain [ ] Palpitations [ ] SILVA [ ] PND [ ] Orthopnea  Gastrointestinal: [ ] Abdominal Pain [ ] Diarrhea [ ] Constipation [ ] Hemorrhoids [ ] Nausea [ ] Vomiting  Genitourinary: [ ] Nocturia [ ] Dysuria [ ] Incontinence  Extremities: [ ] Swelling [ ] Joint Pain  Neurologic: [ ] Focal deficit [ ] Paresthesias [ ] Syncope  Lymphatic: [ ] Swelling [ ] Lymphadenopathy   Skin: [ ] Rash [ ] Ecchymoses [ ] Wounds [ ] Lesions  Psychiatry: [ ] Depression [ ] Suicidal/Homicidal Ideation [ ] Anxiety [ ] Sleep Disturbances  [ ] 10 point review of systems is otherwise negative except as mentioned above              [ ] Unable to obtain    MEDICATIONS  (STANDING):  ALBUTerol/ipratropium for Nebulization 3 milliLiter(s) Nebulizer every 6 hours  apixaban 5 milliGRAM(s) Oral every 12 hours  buDESOnide    Inhalation Suspension 0.5 milliGRAM(s) Inhalation every 12 hours  chlorhexidine 2% Cloths 1 Application(s) Topical daily  dextrose 50% Injectable 12.5 Gram(s) IV Push once  dextrose 50% Injectable 25 Gram(s) IV Push once  dextrose 50% Injectable 25 Gram(s) IV Push once  enalapril 10 milliGRAM(s) Oral every 12 hours  epoetin dejuan Injectable 63733 Unit(s) SubCutaneous <User Schedule>  ferrous    sulfate 325 milliGRAM(s) Oral daily  furosemide    Tablet 40 milliGRAM(s) Oral every 24 hours  insulin glargine Injectable (LANTUS) 20 Unit(s) SubCutaneous at bedtime  insulin lispro (HumaLOG) corrective regimen sliding scale   SubCutaneous three times a day before meals  insulin lispro (HumaLOG) corrective regimen sliding scale   SubCutaneous at bedtime  meropenem  IVPB 1000 milliGRAM(s) IV Intermittent every 8 hours  montelukast 10 milliGRAM(s) Oral every 24 hours  nystatin    Suspension 702573 Unit(s) Oral two times a day  pantoprazole    Tablet 40 milliGRAM(s) Oral two times a day  simvastatin 20 milliGRAM(s) Oral at bedtime  spironolactone 25 milliGRAM(s) Oral daily  vancomycin  IVPB 1250 milliGRAM(s) IV Intermittent every 12 hours    MEDICATIONS  (PRN):  acetaminophen   Tablet .. 650 milliGRAM(s) Oral every 6 hours PRN Mild Pain (1 - 3)  dextrose 40% Gel 15 Gram(s) Oral once PRN Blood Glucose LESS THAN 70 milliGRAM(s)/deciliter  glucagon  Injectable 1 milliGRAM(s) IntraMuscular once PRN Glucose LESS THAN 70 milligrams/deciliter  sodium chloride 0.65% Nasal 1 Spray(s) Both Nostrils daily PRN Nasal Congestion      Vital Signs Last 24 Hrs  T(C): 36.3 (2019 13:47), Max: 36.8 (2019 17:08)  T(F): 97.4 (2019 13:47), Max: 98.3 (2019 17:08)  HR: 100 (2019 13:47) (100 - 114)  BP: 107/70 (2019 13:47) (107/70 - 123/86)  BP(mean): --  RR: 18 (2019 13:47) (16 - 18)  SpO2: 99% (2019 13:47) (97% - 99%)    I&O's Summary    2019 07:  -  2019 07:00  --------------------------------------------------------  IN: 1240 mL / OUT: 1710 mL / NET: -470 mL    2019 07:  -  2019 14:22  --------------------------------------------------------  IN: 120 mL / OUT: 300 mL / NET: -180 mL        PHYSICAL EXAM:  Appearance: Normal	  HEENT:  Normal oral mucosa, PERRL, EOMI	  Lymphatic: No lymphadenopathy  Cardiovascular: Normal S1 S2, No JVD, No murmurs, No edema  Respiratory: Lungs clear to auscultation	  Psychiatry: A & O x 3, Mood & affect appropriate  Gastrointestinal:  Soft, Non-tender, + BS	  Skin: No rashes, No ecchymoses, No cyanosis	  Neurologic: Non-focal  Extremities: Normal range of motion, No clubbing, cyanosis or edema  Vascular: Peripheral pulses palpable 2+ bilaterally    LABS:                        9.6    15.4  )-----------( 537      ( 2019 08:49 )             32.4         135  |  99  |  24<H>  ----------------------------<  176<H>  3.7   |  22  |  0.76    Ca    8.4      2019 08:49  Phos  2.4       Mg     1.7                   proBNP: Serum Pro-Brain Natriuretic Peptide: 11 pg/mL ( @ 18:00)  Serum Pro-Brain Natriuretic Peptide: 25 pg/mL ( @ 10:29)  Serum Pro-Brain Natriuretic Peptide: 48 pg/mL (10-27 @ 14:58)  Serum Pro-Brain Natriuretic Peptide: 49 pg/mL (09-10 @ 09:06)    Lipid Profile: Total Cholesterol: --  LDL: --  HDL: --  T  Total Cholesterol: --  LDL: --  HDL: --  T  Total Cholesterol: --  LDL: --  HDL: --  T  Total Cholesterol: 37  LDL: Unable to calculate LDL Cholesterol (mg/dL) --- Interpretive Comment (for adults 18 and over)  Optimal LDL Level may vary based on clinical situation  Below 70                   Ideal for people at very high risk of heart  disease  Below 100        Ideal for people at risk of heart disease  100 - 129                    [place]  130 - 159                    Borderline high  160 - 189                    High  190 and Above           Very high  HDL: 17  T  Total Cholesterol: 31  LDL: 11  HDL: 15  T    HgA1c: Hemoglobin A1C, Whole Blood: 7.2 % ( @ 11:11)  Hemoglobin A1C, Whole Blood: 7.7 % ( @ 09:12)    TSH:     CARDIOVASCULAR DIAGNOSTIC TESTING:  ECG: Personally Reviewed    [] Echocardiogram:  [] Stress Test:  [] Catheterization:    RADIOLOGY: HEART FAILURE PROGRESS NOTE    INTERVAL EVENTS:  No overnight events.   Denies chest pain, palpitations, SOB, lightheadedness, dizziness.    REVIEW OF SYSTEMS:  Constitutional: [ ] Fever [ ] Chills [ ] Fatigue [ ] Weight change   HEENT: [ ] Blurred vision [ ] Eye Pain [ ] Headache [ ] Runny nose [ ] Sore Throat   Respiratory: [ ] Cough [ ] Wheezing [ ] Shortness of breath  Cardiovascular: [ ] Chest Pain [ ] Palpitations [ ] SILVA [ ] PND [ ] Orthopnea  Gastrointestinal: [ ] Abdominal Pain [ ] Diarrhea [ ] Constipation [ ] Hemorrhoids [ ] Nausea [ ] Vomiting  Genitourinary: [ ] Nocturia [ ] Dysuria [ ] Incontinence  Extremities: [ ] Swelling [ ] Joint Pain  Neurologic: [ ] Focal deficit [ ] Paresthesias [ ] Syncope  Lymphatic: [ ] Swelling [ ] Lymphadenopathy   Skin: [ ] Rash [ ] Ecchymoses [ ] Wounds [ ] Lesions  Psychiatry: [ ] Depression [ ] Suicidal/Homicidal Ideation [ ] Anxiety [ ] Sleep Disturbances  [ ] 10 point review of systems is otherwise negative except as mentioned above              [ ] Unable to obtain    MEDICATIONS  (STANDING):  ALBUTerol/ipratropium for Nebulization 3 milliLiter(s) Nebulizer every 6 hours  apixaban 5 milliGRAM(s) Oral every 12 hours  buDESOnide    Inhalation Suspension 0.5 milliGRAM(s) Inhalation every 12 hours  chlorhexidine 2% Cloths 1 Application(s) Topical daily  dextrose 50% Injectable 12.5 Gram(s) IV Push once  dextrose 50% Injectable 25 Gram(s) IV Push once  dextrose 50% Injectable 25 Gram(s) IV Push once  enalapril 10 milliGRAM(s) Oral every 12 hours  epoetin dejuan Injectable 76589 Unit(s) SubCutaneous <User Schedule>  ferrous    sulfate 325 milliGRAM(s) Oral daily  furosemide    Tablet 40 milliGRAM(s) Oral every 24 hours  insulin glargine Injectable (LANTUS) 20 Unit(s) SubCutaneous at bedtime  insulin lispro (HumaLOG) corrective regimen sliding scale   SubCutaneous three times a day before meals  insulin lispro (HumaLOG) corrective regimen sliding scale   SubCutaneous at bedtime  meropenem  IVPB 1000 milliGRAM(s) IV Intermittent every 8 hours  montelukast 10 milliGRAM(s) Oral every 24 hours  nystatin    Suspension 861602 Unit(s) Oral two times a day  pantoprazole    Tablet 40 milliGRAM(s) Oral two times a day  simvastatin 20 milliGRAM(s) Oral at bedtime  spironolactone 25 milliGRAM(s) Oral daily  vancomycin  IVPB 1250 milliGRAM(s) IV Intermittent every 12 hours    MEDICATIONS  (PRN):  acetaminophen   Tablet .. 650 milliGRAM(s) Oral every 6 hours PRN Mild Pain (1 - 3)  dextrose 40% Gel 15 Gram(s) Oral once PRN Blood Glucose LESS THAN 70 milliGRAM(s)/deciliter  glucagon  Injectable 1 milliGRAM(s) IntraMuscular once PRN Glucose LESS THAN 70 milligrams/deciliter  sodium chloride 0.65% Nasal 1 Spray(s) Both Nostrils daily PRN Nasal Congestion      Vital Signs Last 24 Hrs  T(C): 36.3 (29 Jul 2019 13:47), Max: 36.8 (28 Jul 2019 17:08)  T(F): 97.4 (29 Jul 2019 13:47), Max: 98.3 (28 Jul 2019 17:08)  HR: 100 (29 Jul 2019 13:47) (100 - 114)  BP: 107/70 (29 Jul 2019 13:47) (107/70 - 123/86)  BP(mean): --  RR: 18 (29 Jul 2019 13:47) (16 - 18)  SpO2: 99% (29 Jul 2019 13:47) (97% - 99%)    I&O's Summary    28 Jul 2019 07:01  -  29 Jul 2019 07:00  --------------------------------------------------------  IN: 1240 mL / OUT: 1710 mL / NET: -470 mL      PHYSICAL EXAM:  Appearance: Normal	  HEENT:  Normal oral mucosa, PERRL, EOMI	  Lymphatic: No lymphadenopathy  Cardiovascular: Normal S1 S2, No JVD, No murmurs, No edema  Respiratory: Lungs clear to auscultation	  Psychiatry: A & O x 3, Mood & affect appropriate  Gastrointestinal:  Soft, Non-tender, + BS	  Skin: No rashes, No ecchymoses, No cyanosis	  Neurologic: Non-focal  Extremities: Normal range of motion, No clubbing, cyanosis or edema  Vascular: Peripheral pulses palpable 2+ bilaterally    LABS:                        9.6    15.4  )-----------( 537      ( 28 Jul 2019 08:49 )             32.4     07-28    135  |  99  |  24<H>  ----------------------------<  176<H>  3.7   |  22  |  0.76    Ca    8.4      28 Jul 2019 08:49  Phos  2.4     07-28  Mg     1.7     07-28    proBNP: Serum Pro-Brain Natriuretic Peptide: 11 pg/mL (06-30 @ 18:00)  HgA1c: Hemoglobin A1C, Whole Blood: 7.2 % (06-01 @ 11:11)  TSH:     CARDIOVASCULAR DIAGNOSTIC TESTING:  ECG: Personally Reviewed    [] Echocardiogram:  < from: TTE with Doppler (w/Cont) (07.03.19 @ 07:00) >  Conclusions:  1. Mitral annular calcification. Peak mitral valve gradient  equals 9 mm Hg, mean transmitral valve gradient equals 5 mm  Hg, consistent with moderate mitral stenosis.  2. Aortic valve not well visualized; appears calcified.  Peak transaortic valve gradient equals 25 mm Hg, mean  transaortic valve gradient equals 12 mm Hg, aortic valve  velocity time integral equals 34 cm, consistent with mild  aortic stenosis.  3. Moderate segmental left ventricular systolic  dysfunction. Difficult to assess LV function. Not all  myocardial segments well visualized. Endocardial  visualization enhanced with intravenous injection of  Ultrasonic Enhancing Agent (Definity). The apex appears  aneurysmal. No obvious left ventricular thrombus.  4. The right ventricle is not well visualized.A device  wire is noted in the right heart.  ------------------------------------------------------------------------  Confirmed on  7/3/2019 - 14:10:28 by Prasanth Stanford MD    < end of copied text >    [] Stress Test:  [] Catheterization:    RADIOLOGY:

## 2019-07-29 NOTE — PROGRESS NOTE ADULT - PROBLEM SELECTOR PLAN 1
- Continue enalapril 10mg PO BID, aldactone 25mg PO daily, furosemide 40mg PO daily  - No beta blockers due to severe bronchospastic disease/asthma and known previous intolerance - Enalapril 10mg PO BID, Aldactone 25mg PO daily, Furosemide 40mg PO daily  - No beta blockers due to severe bronchospastic disease/asthma and known previous intolerance -   - Enalapril 10mg PO BID, Aldactone 25mg PO daily, Furosemide 40mg PO daily  - No beta blockers due to severe bronchospastic disease/asthma and known previous intolerance

## 2019-07-29 NOTE — PROGRESS NOTE ADULT - SUBJECTIVE AND OBJECTIVE BOX
Follow Up:  LLL PNA,  Perforated Diverticulitis, Leukocytosis    Interval History: No chills or fevers, continues with cough, abdominal pain stable     REVIEW OF SYSTEMS  [  ] ROS unobtainable because:    [  x] All other systems negative except as noted below    Constitutional:  [ ] fever [ ] chills  [ ] weight loss  [ ] weakness  Skin:  [ ] rash [ ] phlebitis	  Eyes: [ ] icterus [ ] pain  [ ] discharge	  ENMT: [ ] sore throat  [ ] thrush [ ] ulcers [ ] exudates  Respiratory: [ ] dyspnea [ ] hemoptysis [ x] cough [ ] sputum	  Cardiovascular:  [ ] chest pain [ ] palpitations [ ] edema	  Gastrointestinal:  [ ] nausea [ ] vomiting [ ] diarrhea [ ] constipation [ x] pain	  Genitourinary:  [ ] dysuria [ ] frequency [ ] hematuria [ ] discharge [ ] flank pain  [ ] incontinence  Musculoskeletal:  [ ] myalgias [ ] arthralgias [ ] arthritis  [ ] back pain  Neurological:  [ ] headache [ ] seizures  [ ] confusion/altered mental status    Allergies  Coreg (Other)  digoxin (Other; Short breath (Mild to Mod))  penicillins (Hives)        ANTIMICROBIALS:  meropenem  IVPB 1000 every 8 hours  nystatin    Suspension [id number] two times a day  vancomycin  IVPB 1250 every 12 hours      OTHER MEDS:  MEDICATIONS  (STANDING):  acetaminophen   Tablet .. 650 every 6 hours PRN  ALBUTerol/ipratropium for Nebulization 3 every 6 hours  apixaban 5 every 12 hours  buDESOnide    Inhalation Suspension 0.5 every 12 hours  dextrose 40% Gel 15 once PRN  dextrose 50% Injectable 12.5 once  dextrose 50% Injectable 25 once  dextrose 50% Injectable 25 once  enalapril 10 every 12 hours  epoetin dejuan Injectable 54966 <User Schedule>  furosemide    Tablet 40 every 24 hours  glucagon  Injectable 1 once PRN  insulin glargine Injectable (LANTUS) 20 at bedtime  insulin lispro (HumaLOG) corrective regimen sliding scale  three times a day before meals  insulin lispro (HumaLOG) corrective regimen sliding scale  at bedtime  montelukast 10 every 24 hours  pantoprazole    Tablet 40 two times a day  simvastatin 20 at bedtime  spironolactone 25 daily      Vital Signs Last 24 Hrs  T(C): 36.5 (29 Jul 2019 17:15), Max: 36.6 (28 Jul 2019 20:52)  T(F): 97.7 (29 Jul 2019 17:15), Max: 97.9 (28 Jul 2019 20:52)  HR: 100 (29 Jul 2019 17:15) (97 - 114)  BP: 112/72 (29 Jul 2019 17:15) (102/65 - 123/77)  BP(mean): --  RR: 18 (29 Jul 2019 17:15) (16 - 18)  SpO2: 98% (29 Jul 2019 17:15) (95% - 99%)    PHYSICAL EXAMINATION:  General: Awake and alert, NAD  HEENT: PERRL, EOMI, No subconjunctival hemorrhages, Oropharynx Clear, MMM  Neck: Supple, No SARAHI  Cardiac: RRR, No M/R/G  Resp: CTAB, No Wh/Rh/Ra  Abdomen: Obese, +RLQ ostomy with moderate liquid stool output. +midline wound vac with serosanguinous drainage, LLQ drain with serosanguinous material, NBS, ND, minimal abdominal pain today, No HSM, No rigidity or guarding  MSK: trace to 1+ LE edema and 1+ bilateral UE edema. No stigmata of IE. No evidence of phlebitis. No evidence of synovitis.  Skin: No rashes or lesions. Skin is warm and dry to the touch.   Neuro: Awake and alert. CN 2-12 Grossly intact. Moves all four extremities spontaneously.  Psych: Calm, pleasant cooperative                          9.6    15.4  )-----------( 537      ( 28 Jul 2019 08:49 )             32.4       07-28    135  |  99  |  24<H>  ----------------------------<  176<H>  3.7   |  22  |  0.76    Ca    8.4      28 Jul 2019 08:49  Phos  2.4     07-28  Mg     1.7     07-28            MICROBIOLOGY:  v  .Sputum  07-29-19 --  --    Few polymorphonuclear leukocytes per low power field  Few Squamous epithelial cells per low power field  Moderate Gram Negative Rods per oil power field  Rare Yeast like cells per oil power field  Rare Gram Negative Diplococci per oil power field      .Urine  07-24-19   No growth  --  --      .Body Fluid  07-23-19   Numerous Escherichia coli  Few Coag Negative Staphylococcus  Numerous Escherichia coli #2  --  Escherichia coli  Escherichia coli      .Blood  07-10-19   No growth at 5 days.  --  --      .Blood  07-10-19   No growth at 5 days.  --  --      .Body Fluid abdominal fluid  07-09-19   Few Enterococcus faecalis  Few Coag Negative Staphylococcus  --  Enterococcus faecalis  Coag Negative Staphylococcus      .Body Fluid abdominal fluid  07-09-19   Few Enterococcus faecalis  Few Coag Negative Staphylococcus  --  Enterococcus faecalis  Coag Negative Staphylococcus      .Surgical Swab None Specimen Source:  abdomen, esw  07-02-19   Moderate Escherichia coli  Moderate Alpha hemolytic strep "Susceptibilities not performed"  Moderate Strep mitis oralis group "Susceptibilities not performed"  Growth in fluid media only Enterococcus faecalis  --  Escherichia coli  Enterococcus faecalis      .Blood  07-01-19   No growth at 5 days.  --  --      .Urine  07-01-19   No growth  --  --    RADIOLOGY:    EXAM:  CT ABDOMEN AND PELVIS OC IC                        EXAM:  CT CHEST IC                        PROCEDURE DATE:  07/28/2019  CT CHEST:   1.  Left lower lobe pneumonia .    CT ABDOMEN AND PELVIS:  1.  No bowel obstruction.  2.  Small amount of free fluid in the pelvis.

## 2019-07-29 NOTE — PROGRESS NOTE ADULT - ASSESSMENT
74 year old female PMHx of HFrEF s/p AICD, asthma, HTN, HLD, atrial fibrillation on eliquis who presented to [facility] on 6/30 with diverticulitis at splenic flexure with perforation on repeat scan on 7/1.  On 7/2 patient underwent Exploratory laparotomy, partial colectomy, end colostomy, and placement of temporary abdominal VAC dressing. Intraoperatively was noted of having perforation of distal transverse colon with feculent peritonitis. On 7/3 patient underwent Reopening of abdominal wound and an abdominal washout. On 7/9 patient RTOR for Abthera Vac placement and was found to have viable Edematous bowel and minimal pus-like fluid in pelvis which was sent for culture. On 7/10 hypotensive to SBP in the 80's and broadened to Imipenem. Wound cultures from 7/2/19 with E. coli, AHS, Strep mitis and Enterococcus faecalis (amp susceptible).    Ciprofloxacin 6/30 - 7/1  Aztreonam 7/2 - 7/4  Flagyl 6/30 - 7/9  Ceftriaxone 7/4 - 7/9  Imipenem 7/10 -- 7/21    Overall, feculent peritonitis and resolved septic shock secondary to perforated diverticulitis.   7/9 surgical cultures with enterococcus. Imipenem offers coverage for ampicillin susceptible enterococcus   CoNS noted on 7/9 surgical cultures - suspect it is possible contaminant  7/10 blood cultures negative  7/11 RTOR with no evidence of purulence/infection noted  Patient developed cough but CT Chest (7/16) with mucous plugging - no evidence of pneumonia. RVP negative  CT A/P (7/16) with ascites but no abscess  Completed course of Imipenem  Given persistent leukocytosis BASIM drain cultures were checked - overall unclear significance of E. coli and CoNS on these cultures - they may reflect colonization of the BASIM drain as they have been in for longer than 2 weeks  CT C/A/P 7/28 with LLL PNA and no intraabdominal collections    #LLL PNA - at risk for resistant organisms given prolonged imipenem course  --Continue Meropenem 1g IV Q8H  --Continue Vancomycin 1.25g IV Q12H. Trough prior to fourth dose  --Sputum Gram stain with oropharyngeal yesica - would follow up on final result     #Perforated Diverticulitis (Resolved)  --Continue to Monitor off of antibiotics    #Leukocytosis - likely related to LLL PNA  --Continue to trend    I will continue to follow. Please feel free to contact me with any further questions.    Rex Goode M.D.  [facility] Division of Infectious Disease  8AM-5PM: Pager Number [phone]  After Hours (or if no response): Please contact the Infectious Diseases Office at [phone]

## 2019-07-29 NOTE — PROGRESS NOTE ADULT - ASSESSMENT
A/P:74F admitted with abdominal pain and exam findings consistent with peritonitis secondary to acute diverticulitis at the splenic flexure with evidence of contained perforation now s/p Exploratory laparotomy, left hemicolectomy, end colostomy, and ABThera Vac placement and s/p from RTOR for abdominal washout and Abthera vac placement, off pressors. TPN started 7/5 for Protein-Calorie Malnutrition    - TPN goal: Carbohydrates: 210grams, Amino Acid: 120grams, 40g lipids decreased from 2000 to 1200 ml      TPN stopped as pt tolerating / eating more - Dysphagia I DM      ENCOURAGE PO- d/w daughter  - If ongoing nutritional need an ongoing issue- consider KO/ PEG for TFs as pt has a functional GI tract  - Elevated WBC -    Continue to monitor BASIM output      ID COnsult noted  - Strict Intake and Output.,   - Weights three times a week  - Monitor BMP, Mg, Ionized Ca, Phosphorus daily  - Pre-albumin weekly.  - Hyperglycemia- Continue FS w/basal & sliding scale coverage.   - Hyperkalemia -stabilized  - HyperNa- stabilized  - Anemia- pt is a [employer]- continue mngt per primary team, Hematology note appreciated       Improving with supplementation  - Continue as per Surgery d/w team, will follow with you    TPN team, pager [phone], spectra [phone]  D/w Dr Huggins

## 2019-07-29 NOTE — CHART NOTE - NSCHARTNOTEFT_GEN_A_CORE
Nutrition Follow Up Note    Patient seen for: nutrition follow up     Source: pt, medical records     Chart reviewed, events noted. 74F admitted with "perforated diverticulitis s/p exploratory laparotomy, left hemicolectomy, end colostomy, and ABThera Vac placement, s/p 3x RTOR for abdominal washout and abthera vac placement, abdomen closed with Strattice, 2 garcia drains placed (upper abdomen and pelvis), black vac placed over mesh."     Pt NPO as of . TPN initiated , currently tolerating at goal. Per swallow eval, diet advanced to DYS1HC on . TPN reduced by half on  in setting of good ostomy function and diet advancement. TPN DC'ed . Diet advanced to DYS2NC .    Diet: Dysphagia 2 mechanical soft - nectar consistency     Pt reported fair appetite and po intake, stating she would eat 100% of meal if she liked it or a few bites if she does not like it. Tolerating diet. Food preferences obtained and will honor as able.     Colostomy output:  700ml  BASIM drain removed     Daily Weight in k.1 (-), Weight in k.1 (-), Weight in k.7 (-), Weight in k.6 ()  % Weight Change: 6.6% wt change x 24 hrs. ? accuracy of wt.     Pertinent Medications: MEDICATIONS  (STANDING):  ALBUTerol/ipratropium for Nebulization 3 milliLiter(s) Nebulizer every 6 hours  apixaban 5 milliGRAM(s) Oral every 12 hours  buDESOnide    Inhalation Suspension 0.5 milliGRAM(s) Inhalation every 12 hours  chlorhexidine 2% Cloths 1 Application(s) Topical daily  dextrose 50% Injectable 12.5 Gram(s) IV Push once  dextrose 50% Injectable 25 Gram(s) IV Push once  dextrose 50% Injectable 25 Gram(s) IV Push once  enalapril 10 milliGRAM(s) Oral every 12 hours  epoetin dejuan Injectable 85228 Unit(s) SubCutaneous <User Schedule>  ferrous    sulfate 325 milliGRAM(s) Oral daily  furosemide    Tablet 40 milliGRAM(s) Oral every 24 hours  insulin glargine Injectable (LANTUS) 20 Unit(s) SubCutaneous at bedtime  insulin lispro (HumaLOG) corrective regimen sliding scale   SubCutaneous three times a day before meals  insulin lispro (HumaLOG) corrective regimen sliding scale   SubCutaneous at bedtime  meropenem  IVPB 1000 milliGRAM(s) IV Intermittent every 8 hours  montelukast 10 milliGRAM(s) Oral every 24 hours  nystatin    Suspension 049950 Unit(s) Oral two times a day  pantoprazole    Tablet 40 milliGRAM(s) Oral two times a day  simvastatin 20 milliGRAM(s) Oral at bedtime  spironolactone 25 milliGRAM(s) Oral daily  vancomycin  IVPB 1250 milliGRAM(s) IV Intermittent every 12 hours    MEDICATIONS  (PRN):  acetaminophen   Tablet .. 650 milliGRAM(s) Oral every 6 hours PRN Mild Pain (1 - 3)  dextrose 40% Gel 15 Gram(s) Oral once PRN Blood Glucose LESS THAN 70 milliGRAM(s)/deciliter  glucagon  Injectable 1 milliGRAM(s) IntraMuscular once PRN Glucose LESS THAN 70 milligrams/deciliter  sodium chloride 0.65% Nasal 1 Spray(s) Both Nostrils daily PRN Nasal Congestion    Pertinent Labs:   Finger Sticks:  POCT Blood Glucose.: 169 mg/dL ( @ 14:46)  POCT Blood Glucose.: 127 mg/dL ( @ 10:26)  POCT Blood Glucose.: 213 mg/dL ( @ 21:40)  POCT Blood Glucose.: 133 mg/dL ( @ 19:14)      Skin per nursing documentation: no pressure ulcers noted, surgical incision  Edema: 1+ generalized     Estimated Needs:   [X] no change since previous assessment  [ ] recalculated:     Previous Nutrition Diagnosis: Inadequate Protein-Energy Intake  Nutrition Diagnosis is: on-going    New Nutrition Diagnosis:    Interventions:     Recommend  1) Continue diet     Monitoring and Evaluation:     Continue to monitor Nutritional intake, Tolerance to diet prescription, weights, labs, skin integrity    RD remains available upon request and will follow up per protocol Nutrition Follow Up Note    Patient seen for: nutrition follow up     Source: pt, medical records     Chart reviewed, events noted. 74F admitted with "perforated diverticulitis s/p exploratory laparotomy, left hemicolectomy, end colostomy, and ABThera Vac placement, s/p 3x RTOR for abdominal washout and abthera vac placement, abdomen closed with Strattice, 2 garcia drains placed (upper abdomen and pelvis), black vac placed over mesh."     Pt NPO as of . TPN initiated . Per swallow eval, diet advanced to DYS1HC on . TPN reduced by half on  in setting of good ostomy function and diet advancement. TPN DC'ed on . Diet advanced to DYS2NC on .    Diet: Dysphagia 2 mechanical soft - nectar consistency     Pt reported fair appetite and po intake, stating she would eat 100% of meal if she liked it or a few bites if she does not like it. Tolerating diet. Food preferences obtained and will honor as able.     Colostomy output:  700ml  BASIM drain removed     Daily Weight in k.1 (-), Weight in k.1 (-), Weight in k.7 (-), Weight in k.6 (-)  % Weight Change: 6.6% wt change x 24 hrs. ? accuracy of wt.     Pertinent Medications: MEDICATIONS  (STANDING):  ALBUTerol/ipratropium for Nebulization 3 milliLiter(s) Nebulizer every 6 hours  apixaban 5 milliGRAM(s) Oral every 12 hours  buDESOnide    Inhalation Suspension 0.5 milliGRAM(s) Inhalation every 12 hours  chlorhexidine 2% Cloths 1 Application(s) Topical daily  dextrose 50% Injectable 12.5 Gram(s) IV Push once  dextrose 50% Injectable 25 Gram(s) IV Push once  dextrose 50% Injectable 25 Gram(s) IV Push once  enalapril 10 milliGRAM(s) Oral every 12 hours  epoetin dejuan Injectable 58447 Unit(s) SubCutaneous <User Schedule>  ferrous    sulfate 325 milliGRAM(s) Oral daily  furosemide    Tablet 40 milliGRAM(s) Oral every 24 hours  insulin glargine Injectable (LANTUS) 20 Unit(s) SubCutaneous at bedtime  insulin lispro (HumaLOG) corrective regimen sliding scale   SubCutaneous three times a day before meals  insulin lispro (HumaLOG) corrective regimen sliding scale   SubCutaneous at bedtime  meropenem  IVPB 1000 milliGRAM(s) IV Intermittent every 8 hours  montelukast 10 milliGRAM(s) Oral every 24 hours  nystatin    Suspension 230485 Unit(s) Oral two times a day  pantoprazole    Tablet 40 milliGRAM(s) Oral two times a day  simvastatin 20 milliGRAM(s) Oral at bedtime  spironolactone 25 milliGRAM(s) Oral daily  vancomycin  IVPB 1250 milliGRAM(s) IV Intermittent every 12 hours    MEDICATIONS  (PRN):  acetaminophen   Tablet .. 650 milliGRAM(s) Oral every 6 hours PRN Mild Pain (1 - 3)  dextrose 40% Gel 15 Gram(s) Oral once PRN Blood Glucose LESS THAN 70 milliGRAM(s)/deciliter  glucagon  Injectable 1 milliGRAM(s) IntraMuscular once PRN Glucose LESS THAN 70 milligrams/deciliter  sodium chloride 0.65% Nasal 1 Spray(s) Both Nostrils daily PRN Nasal Congestion    Pertinent Labs:   Finger Sticks:  POCT Blood Glucose.: 169 mg/dL ( @ 14:46)  POCT Blood Glucose.: 127 mg/dL ( @ 10:26)  POCT Blood Glucose.: 213 mg/dL ( @ 21:40)  POCT Blood Glucose.: 133 mg/dL ( @ 19:14)      Skin per nursing documentation: no pressure ulcers noted, surgical incision  Edema: 1+ generalized     Estimated Needs: based on dosing wt 83.2Kg with consideration for BMI>30; calories increased for wound healing  5694-1753 kcal/day (20-25 kcal/kg)  116-133 gm protein/day (1.4-1.6 gm/kg)  [X] no change since previous assessment  [ ] recalculated:     Previous Nutrition Diagnosis: Inadequate Protein-Energy Intake  Nutrition Diagnosis is: on-going, recommend oral supplements compliant with dysphagia diet    New Nutrition Diagnosis:    Interventions:     Recommend  1) Continue diet   2) Glucerna BID ( provides 220 kcal + 10gm protein each ) to help meet nutritional needs    Monitoring and Evaluation:     Continue to monitor Nutritional intake, Tolerance to diet prescription, weights, labs, skin integrity    RD remains available upon request and will follow up per protocol

## 2019-07-29 NOTE — PROGRESS NOTE ADULT - ATTENDING COMMENTS
I have seen and evaluated the patient and discussed the relevant clinical findings and plan with the surgical housestaff and fellow.  Agree with the above documentation with addenda as noted.     Abx as per ID for PNA  Vac changes 3x per week  Remove BASIM drain today  Plan for discharge to rehab this week    Clark Alvarado MD

## 2019-07-29 NOTE — PROGRESS NOTE ADULT - PROBLEM SELECTOR PROBLEM 2
Diverticulitis of large intestine with perforation
Septic shock
Diverticulitis of large intestine with perforation
Septic shock
Septic shock

## 2019-07-29 NOTE — PROGRESS NOTE ADULT - SUBJECTIVE AND OBJECTIVE BOX
Interval Events:    S: Patient doing well, denies fevers, chills, nausea, emesis, chest pain, SOB.    O: Vital Signs  T(C): 36.4 (07-29 @ 01:41), Max: 36.8 (07-28 @ 17:08)  HR: 114 (07-29 @ 01:41) (103 - 114)  BP: 108/74 (07-29 @ 01:41) (101/68 - 127/80)  RR: 18 (07-29 @ 01:41) (17 - 18)  SpO2: 97% (07-29 @ 01:41) (97% - 99%)  07-27-19 @ 07:01  -  07-28-19 @ 07:00  --------------------------------------------------------  IN: 760 mL / OUT: 1060 mL / NET: -300 mL    07-28-19 @ 07:01  -  07-29-19 @ 04:46  --------------------------------------------------------  IN: 1020 mL / OUT: 1460 mL / NET: -440 mL      General: alert and oriented, NAD  Resp: airway patent, respirations unlabored  CVS: regular rate and rhythm  Abdomen: soft, nontender, nondistended  Extremities: no edema  Skin: warm, dry, appropriate color                          9.6    15.4  )-----------( 537      ( 28 Jul 2019 08:49 )             32.4   07-28    135  |  99  |  24<H>  ----------------------------<  176<H>  3.7   |  22  |  0.76    Ca    8.4      28 Jul 2019 08:49  Phos  2.4     07-28  Mg     1.7     07-28

## 2019-07-29 NOTE — PROVIDER CONTACT NOTE (OTHER) - BACKGROUND
Admitted for diverticulitis. S/p ex lap left hemicolectomy w/ colostomy. S/p open repair w/ incisional hernia using biological mesh w/ abdominal wash out and wound vac closure
Extensive hospital course see SICU progress note
Extensive hospital course see SICU progress note.
Extensive hospital course see SICU progress note. Patient s/p ex lap 7/11 for washout/closure with mesh
Pt currently on tele, extensive cardiac history this admission
Pt is a 74 year old female admitted with diverticulitis/bowel perf sp ex lap, partial colectomy & colostomy
Sent to OR w/ insulin gtt rate set to 8, as per anaesthesia  Pt fs 95 intra op CRNA titrated insulin gtt to 4 units / hr
pt admitted with abd pain 6/30 as well as respiratory distress. bloody stools. s/p ex lap colectomy ostomy wound vac.
Pt with extensive hospital stay, multiple abdominal washouts

## 2019-07-29 NOTE — PROGRESS NOTE ADULT - PROBLEM SELECTOR PROBLEM 3
Diverticulitis of large intestine without perforation or abscess without bleeding
Chronic systolic heart failure
Diverticulitis of large intestine without perforation or abscess without bleeding
Chronic systolic heart failure
Diverticulitis of large intestine without perforation or abscess without bleeding

## 2019-07-29 NOTE — PROGRESS NOTE ADULT - ATTENDING COMMENTS
My overall assessment is that this is a 75 YO F with a history of HF with improved EF of 45% s/p CRT-D, severe asthma, AF on Eliquis who presented with perforated acute diverticulitis s/p ex-lap, left hemicolectomy, end colostomy with course further c/b septic shock now resolved. She is slowly recovering.     Review of pertinent studies reveals:  TTE 7/3: very limited study, mean mitral valve gradient 5 mmHg, aneurysmal apex     LHC 3/2018: clean coronaries     Major issues by problem:  # Chronic systolic heart failure, appears euvolemic and tolerating medical therapy  # Perforated diverticulitis s/p operative treatment as above   # Septic shock, resolved    The plan for today is as follows:  - Continue enalapril 10 mg BID, spironolactone 25 mg daily, no BB due to severe bronchospastic disease  - Continue lasix 40 mg PO daily, appears euvolemic  - We will sign off but please call back with any questions or concerns

## 2019-07-29 NOTE — PROGRESS NOTE ADULT - PROBLEM SELECTOR PROBLEM 1
Chronic systolic heart failure
Septic shock
Chronic systolic heart failure
Chronic systolic heart failure

## 2019-07-30 LAB
ALBUMIN SERPL ELPH-MCNC: 2.9 G/DL — LOW (ref 3.3–5)
ANION GAP SERPL CALC-SCNC: 12 MMOL/L — SIGNIFICANT CHANGE UP (ref 5–17)
BUN SERPL-MCNC: 22 MG/DL — SIGNIFICANT CHANGE UP (ref 7–23)
CALCIUM SERPL-MCNC: 8.2 MG/DL — LOW (ref 8.4–10.5)
CHLORIDE SERPL-SCNC: 97 MMOL/L — SIGNIFICANT CHANGE UP (ref 96–108)
CO2 SERPL-SCNC: 24 MMOL/L — SIGNIFICANT CHANGE UP (ref 22–31)
CREAT SERPL-MCNC: 0.81 MG/DL — SIGNIFICANT CHANGE UP (ref 0.5–1.3)
GLUCOSE BLDC GLUCOMTR-MCNC: 110 MG/DL — HIGH (ref 70–99)
GLUCOSE BLDC GLUCOMTR-MCNC: 130 MG/DL — HIGH (ref 70–99)
GLUCOSE BLDC GLUCOMTR-MCNC: 143 MG/DL — HIGH (ref 70–99)
GLUCOSE BLDC GLUCOMTR-MCNC: 149 MG/DL — HIGH (ref 70–99)
GLUCOSE SERPL-MCNC: 141 MG/DL — HIGH (ref 70–99)
HCT VFR BLD CALC: 31.9 % — LOW (ref 34.5–45)
HGB BLD-MCNC: 10 G/DL — LOW (ref 11.5–15.5)
MAGNESIUM SERPL-MCNC: 1.8 MG/DL — SIGNIFICANT CHANGE UP (ref 1.6–2.6)
MCHC RBC-ENTMCNC: 31.3 GM/DL — LOW (ref 32–36)
MCHC RBC-ENTMCNC: 32.6 PG — SIGNIFICANT CHANGE UP (ref 27–34)
MCV RBC AUTO: 104 FL — HIGH (ref 80–100)
PHOSPHATE SERPL-MCNC: 2.3 MG/DL — LOW (ref 2.5–4.5)
PLATELET # BLD AUTO: 480 K/UL — HIGH (ref 150–400)
POTASSIUM SERPL-MCNC: 3.9 MMOL/L — SIGNIFICANT CHANGE UP (ref 3.5–5.3)
POTASSIUM SERPL-SCNC: 3.9 MMOL/L — SIGNIFICANT CHANGE UP (ref 3.5–5.3)
RBC # BLD: 3.06 M/UL — LOW (ref 3.8–5.2)
RBC # FLD: 19 % — HIGH (ref 10.3–14.5)
SODIUM SERPL-SCNC: 133 MMOL/L — LOW (ref 135–145)
VANCOMYCIN TROUGH SERPL-MCNC: 18 UG/ML — SIGNIFICANT CHANGE UP (ref 10–20)
VANCOMYCIN TROUGH SERPL-MCNC: 26.3 UG/ML — CRITICAL HIGH (ref 10–20)
WBC # BLD: 15.1 K/UL — HIGH (ref 3.8–10.5)
WBC # FLD AUTO: 15.1 K/UL — HIGH (ref 3.8–10.5)

## 2019-07-30 PROCEDURE — 99232 SBSQ HOSP IP/OBS MODERATE 35: CPT

## 2019-07-30 RX ADMIN — CHLORHEXIDINE GLUCONATE 1 APPLICATION(S): 213 SOLUTION TOPICAL at 08:17

## 2019-07-30 RX ADMIN — SIMVASTATIN 20 MILLIGRAM(S): 20 TABLET, FILM COATED ORAL at 21:11

## 2019-07-30 RX ADMIN — Medication 500000 UNIT(S): at 06:37

## 2019-07-30 RX ADMIN — MEROPENEM 100 MILLIGRAM(S): 1 INJECTION INTRAVENOUS at 14:54

## 2019-07-30 RX ADMIN — Medication 3 MILLILITER(S): at 18:01

## 2019-07-30 RX ADMIN — INSULIN GLARGINE 20 UNIT(S): 100 INJECTION, SOLUTION SUBCUTANEOUS at 21:12

## 2019-07-30 RX ADMIN — Medication 3 MILLILITER(S): at 06:37

## 2019-07-30 RX ADMIN — Medication 0.5 MILLIGRAM(S): at 06:37

## 2019-07-30 RX ADMIN — Medication 40 MILLIGRAM(S): at 06:37

## 2019-07-30 RX ADMIN — Medication 3 MILLILITER(S): at 12:40

## 2019-07-30 RX ADMIN — Medication 325 MILLIGRAM(S): at 12:40

## 2019-07-30 RX ADMIN — Medication 10 MILLIGRAM(S): at 06:37

## 2019-07-30 RX ADMIN — Medication 0.5 MILLIGRAM(S): at 18:01

## 2019-07-30 RX ADMIN — MEROPENEM 100 MILLIGRAM(S): 1 INJECTION INTRAVENOUS at 21:11

## 2019-07-30 RX ADMIN — PANTOPRAZOLE SODIUM 40 MILLIGRAM(S): 20 TABLET, DELAYED RELEASE ORAL at 06:37

## 2019-07-30 RX ADMIN — Medication 500000 UNIT(S): at 18:02

## 2019-07-30 RX ADMIN — APIXABAN 5 MILLIGRAM(S): 2.5 TABLET, FILM COATED ORAL at 06:37

## 2019-07-30 RX ADMIN — Medication 62.5 MILLIMOLE(S): at 16:26

## 2019-07-30 RX ADMIN — Medication 10 MILLIGRAM(S): at 18:53

## 2019-07-30 RX ADMIN — MONTELUKAST 10 MILLIGRAM(S): 4 TABLET, CHEWABLE ORAL at 12:40

## 2019-07-30 RX ADMIN — MEROPENEM 100 MILLIGRAM(S): 1 INJECTION INTRAVENOUS at 06:38

## 2019-07-30 RX ADMIN — APIXABAN 5 MILLIGRAM(S): 2.5 TABLET, FILM COATED ORAL at 18:02

## 2019-07-30 RX ADMIN — SPIRONOLACTONE 25 MILLIGRAM(S): 25 TABLET, FILM COATED ORAL at 06:37

## 2019-07-30 RX ADMIN — PANTOPRAZOLE SODIUM 40 MILLIGRAM(S): 20 TABLET, DELAYED RELEASE ORAL at 18:02

## 2019-07-30 NOTE — PROVIDER CONTACT NOTE (OTHER) - SITUATION
No output noted from colostomy, +flatus.
Patient with 12 beats wide complex on cardiac monitor
Patient with 6 beats wide complex on cardiac monitor
Patients BP 85/50
Pt  s/p return from OR for abd washout from previous bowel perforation.
Pt on moderate sliding scale with FSBG ordered q4h- last two FSBG 214 and 212.
Remote tele reported possible a-fib vs wide complex
pt c/o of  chest pain
pt s/p diverticulities w/ bowel perforation and ex lap w/ washout abthera vac placement.
13 beats of wide complex at 0100 as per telemetry tech
Pt had 5.7 sec of PAT with HR between 130-180

## 2019-07-30 NOTE — PROVIDER CONTACT NOTE (OTHER) - ACTION/TREATMENT ORDERED:
As per MD, no interventions at this time. Will continue to monitor pt
continue to monitor
MD aware - continue to monitor
Dr. gray will be at bedside to evaluate pt.
MD & team aware, cardiology is following. Continue to monitor.
pt placed on insulin gtt and lactates drawn q 2hrs
EKG done on return from OR, repeat not necessary at this time. Cardiac enzymes not necessary, basic labs ordered and sent. Give 2200 dose lopressor 2.5mg IVP
No EKG needed at this time, continue to monitor patient.
Precedex D/C'd. Reassess pressure. Will reassess lopressor dose
no need for insulin gtt at this time- administer insulin per mod sliding scale
pt prepped for transfer. labs drawn EKG done
rpt fs 137 pt to remain off insulin gtt. insulin gtt dc'd as per sicu resident [facility]

## 2019-07-30 NOTE — PROGRESS NOTE ADULT - SUBJECTIVE AND OBJECTIVE BOX
Interval Events: patient had occasional episodes of shortness of breath, but no desaturation episodes    S: Patient seen and examined this am. Patient doing well, denies fevers, chills, nausea, emesis, chest pain, SOB, abdominal pain    O: Vital Signs  T(C): 36.5 (07-30 @ 01:59), Max: 36.7 (07-29 @ 21:45)  HR: 107 (07-30 @ 01:59) (97 - 107)  BP: 116/77 (07-30 @ 01:59) (102/65 - 123/77)  RR: 18 (07-30 @ 01:59) (16 - 18)  SpO2: 100% (07-30 @ 01:59) (95% - 100%)  07-28-19 @ 07:01  -  07-29-19 @ 07:00  --------------------------------------------------------  IN: 1240 mL / OUT: 1710 mL / NET: -470 mL    07-29-19 @ 07:01  -  07-30-19 @ 04:56  --------------------------------------------------------  IN: 620 mL / OUT: 900 mL / NET: -280 mL      General: alert and oriented, NAD  Resp: airway patent, respirations unlabored  CVS: regular rate and rhythm  Abdomen: soft, mildly tender, distended. Wound vac in place and functioning  Extremities: no edema  Skin: warm, dry, appropriate color                          9.6    15.4  )-----------( 537      ( 28 Jul 2019 08:49 )             32.4   07-28    135  |  99  |  24<H>  ----------------------------<  176<H>  3.7   |  22  |  0.76    Ca    8.4      28 Jul 2019 08:49  Phos  2.4     07-28  Mg     1.7     07-28 Interval Events: patient had occasional episodes of shortness of breath, but no desaturation episodes    S: Patient seen and examined this am. Patient doing well, denies fevers, chills, nausea, emesis, chest pain, SOB, abdominal pain. Her ostomy is functioning well. Her Vanco trough came back 26 and are currently holding morning Vanco dose.     O: Vital Signs  Vital Signs Last 24 Hrs  T(C): 36.6 (30 Jul 2019 09:10), Max: 36.7 (29 Jul 2019 21:45)  T(F): 97.8 (30 Jul 2019 09:10), Max: 98 (29 Jul 2019 21:45)  HR: 103 (30 Jul 2019 09:10) (97 - 107)  BP: 115/75 (30 Jul 2019 09:10) (102/65 - 121/76)  BP(mean): --  RR: 18 (30 Jul 2019 09:10) (16 - 18)  SpO2: 99% (30 Jul 2019 09:10) (95% - 100%)    I&O's Detail    29 Jul 2019 07:01  -  30 Jul 2019 07:00  --------------------------------------------------------  IN:    Oral Fluid: 440 mL    Solution: 300 mL  Total IN: 740 mL    OUT:    Colostomy: 300 mL    Voided: 600 mL  Total OUT: 900 mL    Total NET: -160 mL      30 Jul 2019 07:01  -  30 Jul 2019 09:28  --------------------------------------------------------  IN:    Oral Fluid: 140 mL  Total IN: 140 mL    OUT:  Total OUT: 0 mL    Total NET: 140 mL        General: alert and oriented, NAD  Resp: airway patent, respirations unlabored  CVS: regular rate and rhythm  Abdomen: soft,non tender, non distended. Wound vac in place and functioning, ostomy viable and functioning   Extremities: no edema  Skin: warm, dry, appropriate color                                     10.0   15.1  )-----------( 480      ( 30 Jul 2019 08:58 )             31.9         Culture - Sputum . (07.29.19 @ 04:17)    Gram Stain:   Few polymorphonuclear leukocytes per low power field  Few Squamous epithelial cells per low power field  Moderate Gram Negative Rods per oil power field  Rare Yeast like cells per oil power field  Rare Gram Negative Diplococci per oil power field    Specimen Source: .Sputum    Culture Results:   Normal Respiratory Liz present

## 2019-07-30 NOTE — PROGRESS NOTE ADULT - ASSESSMENT
74 year old female PMHx of HFrEF s/p AICD, asthma, HTN, HLD, atrial fibrillation on eliquis who presented to [facility] on 6/30 with diverticulitis at splenic flexure with perforation on repeat scan on 7/1.  On 7/2 patient underwent Exploratory laparotomy, partial colectomy, end colostomy, and placement of temporary abdominal VAC dressing. Intraoperatively was noted of having perforation of distal transverse colon with feculent peritonitis. On 7/3 patient underwent Reopening of abdominal wound and an abdominal washout. On 7/9 patient RTOR for Abthera Vac placement and was found to have viable Edematous bowel and minimal pus-like fluid in pelvis which was sent for culture. On 7/10 hypotensive to SBP in the 80's and broadened to Imipenem. Wound cultures from 7/2/19 with E. coli, AHS, Strep mitis and Enterococcus faecalis (amp susceptible).      Overall, feculent peritonitis and resolved septic shock secondary to perforated diverticulitis.   7/9 surgical cultures with enterococcus. Imipenem offers coverage for ampicillin susceptible enterococcus   CoNS noted on 7/9 surgical cultures - suspect it is possible contaminant  7/10 blood cultures negative  7/11 RTOR with no evidence of purulence/infection noted  Patient developed cough but CT Chest (7/16) with mucous plugging - no evidence of pneumonia. RVP negative  CT A/P (7/16) with ascites but no abscess  Completed course of Imipenem    Given persistent leukocytosis BASIM drain cultures were checked - overall unclear significance of E. coli and CoNS on these cultures - they may reflect colonization of the BASIM drain as they have been in for longer than 2 weeks  CT C/A/P 7/28 with LLL PNA and no intraabdominal collections  Restarted on Vancomycin and Meropenem on 7/28    #LLL PNA - at risk for resistant organisms given prolonged imipenem course  --Continue Meropenem 1g IV Q8H  --Recheck Vancomycin level tonight prior to 10 PM dose. Would restart at 1g IV Q12H if trough level 10-15  --Sputum Gram stain with normal respiratory yesica - would follow up on final result.    #Perforated Diverticulitis (Resolved)  --Continue to Monitor off of antibiotics    #Leukocytosis - likely related to LLL PNA vs. post-surgical  --Continue to trend    I will continue to follow. Please feel free to contact me with any further questions.    Rex Goode M.D.  [facility] Division of Infectious Disease  8AM-5PM: Pager Number [phone]  After Hours (or if no response): Please contact the Infectious Diseases Office at [phone]

## 2019-07-30 NOTE — PROGRESS NOTE ADULT - ASSESSMENT
A/P: 73 yo Female with PMH of asthma, A fib, HFrEF with perforated diverticulitis s/p L hemicolectomy and end colostomy with VAC placement along with multiple washouts and closure with bridging mesh.  Doing well, tolerating dysphagia diet.  Leukocytosis (14 from 12), remains afebrile.    - Per Cards- OK to discharge with an increase in enalipril to 10mg PO BID. Please see Heart failure fellow note for detailed recs  - Continue discharge planning with case management for [facility]   - F/u sputum culture for ID recs regarding abx duration    [facility], [phone] A/P: 75 yo Female with PMH of asthma, A fib, HFrEF with perforated diverticulitis s/p L hemicolectomy and end colostomy with VAC placement along with multiple washouts and closure with bridging mesh.  Doing well, tolerating dysphagia diet.  Leukocytosis (14 from 12), remains afebrile.    - Per Cards- OK to discharge with an increase in enalipril to 10mg PO BID. Please see Heart failure fellow note for detailed recs  - Continue discharge planning with case management for [facility]   - F/u sputum culture for ID recs regarding abx duration  - F/u w/ ID given high Vanco through to see if we should continue or hold    [facility], [phone]

## 2019-07-30 NOTE — PROVIDER CONTACT NOTE (OTHER) - REASON
12 beats wide complex
6 beats wide complex
BP 85/50
Blood Sugar 65
Blood Sugar consecutively elevated
No output from colostomy, +flatus, low urine output
Pt had 5 beats of wide complexes
Pt's FSBG >200 for 2 consecutive times
Remote tele reported possible a-fib vs wide complex
pt c/o of chest pain
13 beats of wide complex at 0100 as per telemetry tech
Pt had 5.7 sec of PAT with HR between 130-180

## 2019-07-30 NOTE — PROGRESS NOTE ADULT - ATTENDING COMMENTS
Daughter bringing in more foods, varieties  1.  Protein calorie malnutriton--important to get calorie count, nutritional parameters to assess adequacy of diet.  No evidence of malabsorption requiring TPN.  Would not require PICC for TPN purposes  2.  HYperglycemia--trend glycemic control.  Seems decent control with LA insulin

## 2019-07-30 NOTE — PROGRESS NOTE ADULT - SUBJECTIVE AND OBJECTIVE BOX
Follow Up:  LLL PNA,  Perforated Diverticulitis, Leukocytosis    Interval History: No chills or fevers, notes some improvement in her cough. Denies significant abdominal pain.    REVIEW OF SYSTEMS  [  ] ROS unobtainable because:    [  x] All other systems negative except as noted below    Constitutional:  [ ] fever [ ] chills  [ ] weight loss  [ ] weakness  Skin:  [ ] rash [ ] phlebitis	  Eyes: [ ] icterus [ ] pain  [ ] discharge	  ENMT: [ ] sore throat  [ ] thrush [ ] ulcers [ ] exudates  Respiratory: [ ] dyspnea [ ] hemoptysis [ x] cough [ ] sputum	  Cardiovascular:  [ ] chest pain [ ] palpitations [ ] edema	  Gastrointestinal:  [ ] nausea [ ] vomiting [ ] diarrhea [ ] constipation [ x] pain	  Genitourinary:  [ ] dysuria [ ] frequency [ ] hematuria [ ] discharge [ ] flank pain  [ ] incontinence  Musculoskeletal:  [ ] myalgias [ ] arthralgias [ ] arthritis  [ ] back pain  Neurological:  [ ] headache [ ] seizures  [ ] confusion/altered mental status    Allergies  Coreg (Other)  digoxin (Other; Short breath (Mild to Mod))  penicillins (Hives)        ANTIMICROBIALS:  meropenem  IVPB 1000 every 8 hours  nystatin    Suspension 052751 two times a day  vancomycin  IVPB 1250 every 12 hours      OTHER MEDS:  MEDICATIONS  (STANDING):  acetaminophen   Tablet .. 650 every 6 hours PRN  ALBUTerol/ipratropium for Nebulization 3 every 6 hours  apixaban 5 every 12 hours  buDESOnide    Inhalation Suspension 0.5 every 12 hours  dextrose 40% Gel 15 once PRN  dextrose 50% Injectable 12.5 once  dextrose 50% Injectable 25 once  dextrose 50% Injectable 25 once  enalapril 10 every 12 hours  epoetin dejuan Injectable 08962 <User Schedule>  furosemide    Tablet 40 every 24 hours  glucagon  Injectable 1 once PRN  insulin glargine Injectable (LANTUS) 20 at bedtime  insulin lispro (HumaLOG) corrective regimen sliding scale  three times a day before meals  insulin lispro (HumaLOG) corrective regimen sliding scale  at bedtime  montelukast 10 every 24 hours  pantoprazole    Tablet 40 two times a day  simvastatin 20 at bedtime  spironolactone 25 daily      Vital Signs Last 24 Hrs  T(C): 36.7 (30 Jul 2019 16:45), Max: 36.7 (29 Jul 2019 21:45)  T(F): 98 (30 Jul 2019 16:45), Max: 98 (29 Jul 2019 21:45)  HR: 105 (30 Jul 2019 17:57) (101 - 107)  BP: 101/70 (30 Jul 2019 17:57) (94/56 - 121/76)  BP(mean): --  RR: 18 (30 Jul 2019 16:45) (16 - 18)  SpO2: 99% (30 Jul 2019 16:45) (98% - 100%)    PHYSICAL EXAMINATION:  General: Awake and alert, NAD  HEENT: PERRL, EOMI, No subconjunctival hemorrhages, Oropharynx Clear, MMM  Neck: Supple, No SARAHI  Cardiac: RRR, No M/R/G  Resp: CTAB, No Wh/Rh/Ra  Abdomen: Obese, +RLQ ostomy with moderate liquid stool output. +midline wound vac with serosanguinous drainage, LLQ drain with serosanguinous material, NBS, ND, minimal abdominal pain today, No HSM, No rigidity or guarding  MSK: trace to 1+ LE edema and 1+ bilateral UE edema. No stigmata of IE. No evidence of phlebitis. No evidence of synovitis.  Skin: No rashes or lesions. Skin is warm and dry to the touch.   Neuro: Awake and alert. CN 2-12 Grossly intact. Moves all four extremities spontaneously.  Psych: Calm, pleasant cooperative                            10.0   15.1  )-----------( 480      ( 30 Jul 2019 08:58 )             31.9       07-30    133<L>  |  97  |  22  ----------------------------<  141<H>  3.9   |  24  |  0.81    Ca    8.2<L>      30 Jul 2019 12:20  Phos  2.3     07-30  Mg     1.8     07-30    TPro  x   /  Alb  2.9<L>  /  TBili  x   /  DBili  x   /  AST  x   /  ALT  x   /  AlkPhos  x   07-30          MICROBIOLOGY:  Vancomycin Level, Trough: 26.3 ug/mL (07-30-19 @ 08:58)  v  .Sputum  07-29-19   Normal Respiratory Liz present  --    Few polymorphonuclear leukocytes per low power field  Few Squamous epithelial cells per low power field  Moderate Gram Negative Rods per oil power field  Rare Yeast like cells per oil power field  Rare Gram Negative Diplococci per oil power field      .Urine  07-24-19   No growth  --  --      .Body Fluid  07-23-19   Numerous Escherichia coli  Few Coag Negative Staphylococcus  Numerous Escherichia coli #2  --  Escherichia coli  Escherichia coli      .Blood  07-10-19   No growth at 5 days.  --  --      .Blood  07-10-19   No growth at 5 days.  --  --      .Body Fluid abdominal fluid  07-09-19   Few Enterococcus faecalis  Few Coag Negative Staphylococcus  --  Enterococcus faecalis  Coag Negative Staphylococcus      .Body Fluid abdominal fluid  07-09-19   Few Enterococcus faecalis  Few Coag Negative Staphylococcus  --  Enterococcus faecalis  Coag Negative Staphylococcus      .Surgical Swab None Specimen Source:  abdomen, esw  07-02-19   Moderate Escherichia coli  Moderate Alpha hemolytic strep "Susceptibilities not performed"  Moderate Strep mitis oralis group "Susceptibilities not performed"  Growth in fluid media only Enterococcus faecalis  --  Escherichia coli  Enterococcus faecalis      .Blood  07-01-19   No growth at 5 days.  --  --      .Urine  07-01-19   No growth  --  --      RADIOLOGY:    EXAM:  CT ABDOMEN AND PELVIS OC IC                        EXAM:  CT CHEST IC                        PROCEDURE DATE:  07/28/2019  CT CHEST:   1.  Left lower lobe pneumonia .    CT ABDOMEN AND PELVIS:  1.  No bowel obstruction.  2.  Small amount of free fluid in the pelvis.

## 2019-07-30 NOTE — PROVIDER CONTACT NOTE (CRITICAL VALUE NOTIFICATION) - ACTION/TREATMENT ORDERED:
PA aware. pending new orders. PA will contact ID and notify RN PA aware. pending new orders. PA will contact ID and notify RN, hold dose for now at this time

## 2019-07-30 NOTE — PROVIDER CONTACT NOTE (OTHER) - RECOMMENDATIONS
continue to monitor
MD to assess pt at bedside.
insulin gtt. and operative intervention
1 amp  D50 given, will reassess
Decrease lopressor back to 2.5mg? Precedex off.
EKG/labs/cardiac enzymes
EKG?
blood gas ordered. team wants pt in SICU
insulin gtt?

## 2019-07-30 NOTE — PROGRESS NOTE ADULT - SUBJECTIVE AND OBJECTIVE BOX
[facility] NUTRITION SUPPORT / TPN -- FOLLOW UP NOTE  --------------------------------------------------------------------------------    24 hour events/subjective:  - Pt more awake/ alert- doesn't like the taste of food- reminded her importance of eating  - Tolerating dysphagia diet, eating more- daughter wants to bring in food favorites to help encourage  - No acute events overnight  - more alert later in the day, no n/v/d  - no f/c/s  - no cp/ palp/ sob  - Pt on tele on 9monti  - Encouraged eating and oob to chair       Encourage ambulation and increased mobility/ pt being offloaded  - PT to follow- pt for MATT d/c      Diet:  Diet, Dysphagia 2 Mechanical Soft-Nectar Consistency Fluid (07-27-19 @ 14:43)      Appetite: [  x]Poor [  ]Adequate [  ]Good  Caloric intake:  [   ]  Adequate   [  x ] Inadequate    ROS: General/ GI see HPI  all other systems negative    ALLERGIES & MEDICATIONS  --------------------------------------------------------------------------------  ALLERGIES  Coreg (Other)  digoxin (Other; Short breath (Mild to Mod))  penicillins (Hives)      INTOLERANCES  metoprolol (Other)  Solu-Medrol (Other (Mild to Mod))    STANDING INPATIENT MEDICATIONS    ALBUTerol/ipratropium for Nebulization 3 milliLiter(s) Nebulizer every 6 hours  apixaban 5 milliGRAM(s) Oral every 12 hours  buDESOnide    Inhalation Suspension 0.5 milliGRAM(s) Inhalation every 12 hours  chlorhexidine 2% Cloths 1 Application(s) Topical daily  dextrose 50% Injectable 12.5 Gram(s) IV Push once  dextrose 50% Injectable 25 Gram(s) IV Push once  dextrose 50% Injectable 25 Gram(s) IV Push once  enalapril 10 milliGRAM(s) Oral every 12 hours  epoetin dejuan Injectable 50212 Unit(s) SubCutaneous <User Schedule>  ferrous    sulfate 325 milliGRAM(s) Oral daily  furosemide    Tablet 40 milliGRAM(s) Oral every 24 hours  insulin glargine Injectable (LANTUS) 20 Unit(s) SubCutaneous at bedtime  insulin lispro (HumaLOG) corrective regimen sliding scale   SubCutaneous three times a day before meals  insulin lispro (HumaLOG) corrective regimen sliding scale   SubCutaneous at bedtime  meropenem  IVPB 1000 milliGRAM(s) IV Intermittent every 8 hours  montelukast 10 milliGRAM(s) Oral every 24 hours  nystatin    Suspension 828481 Unit(s) Oral two times a day  pantoprazole    Tablet 40 milliGRAM(s) Oral two times a day  simvastatin 20 milliGRAM(s) Oral at bedtime  sodium phosphate IVPB 15 milliMole(s) IV Intermittent once  spironolactone 25 milliGRAM(s) Oral daily  vancomycin  IVPB 1250 milliGRAM(s) IV Intermittent every 12 hours      PRN INPATIENT MEDICATION  acetaminophen   Tablet .. 650 milliGRAM(s) Oral every 6 hours PRN  dextrose 40% Gel 15 Gram(s) Oral once PRN  glucagon  Injectable 1 milliGRAM(s) IntraMuscular once PRN  sodium chloride 0.65% Nasal 1 Spray(s) Both Nostrils daily PRN        VITALS/PHYSICAL EXAM  --------------------------------------------------------------------------------  T(C): 36.4 (07-30-19 @ 13:11), Max: 36.7 (07-29-19 @ 21:45)  HR: 103 (07-30-19 @ 13:11) (100 - 107)  BP: 118/77 (07-30-19 @ 13:11) (112/72 - 121/76)  RR: 18 (07-30-19 @ 13:11) (16 - 18)  SpO2: 98% (07-30-19 @ 13:11) (98% - 100%)  Wt(kg): --        07-29-19 @ 07:01  -  07-30-19 @ 07:00  --------------------------------------------------------  IN: 740 mL / OUT: 900 mL / NET: -160 mL    07-30-19 @ 07:01  -  07-30-19 @ 14:53  --------------------------------------------------------  IN: 260 mL / OUT: 150 mL / NET: 110 mL    Physical Exam:    	Gen: NAD, WD/ WN/ A&Ox3  	HEENT: NC/AT, PERRL, mucosa moist, supple neck, clear oropharynx  	Chest: clear  	GI: +softly distended, nontender, no BS                  (+)Ostomy pink & viable. liquid & soft formed stool                  VAC intact w/ good seal, drain w/ serous drainage              :  (+)Ching              MSK/Vascular: Passive ROM x4,  no clubbing, cyanosis, nor edema                   RUE PICC dressing C/D/I w/o SOI w/ no edema  	Neuro: Awake and alert  	Skin: Warm, good turgor, without rashes        LABS/ CULTURES/ RADIOLOGY:              10.0   15.1  >-----------<  480      [07-30-19 @ 08:58]              31.9     133  |  97  |  22  ----------------------------<  141      [07-30-19 @ 12:20]  3.9   |  24  |  0.81        Ca     8.2     [07-30-19 @ 12:20]      Mg     1.8     [07-30-19 @ 12:20]      Phos  2.3     [07-30-19 @ 12:20]        CAPILLARY BLOOD GLUCOSE      POCT Blood Glucose.: 130 mg/dL (30 Jul 2019 11:48)  POCT Blood Glucose.: 110 mg/dL (30 Jul 2019 08:28)  POCT Blood Glucose.: 195 mg/dL (29 Jul 2019 21:34)  POCT Blood Glucose.: 173 mg/dL (29 Jul 2019 20:10)    Prealbumin, Serum: 12 mg/dL (07-24-19 @ 14:57)  Prealbumin, Serum: 5 mg/dL (07-04-19 @ 22:45)

## 2019-07-30 NOTE — PROGRESS NOTE ADULT - ASSESSMENT
A/P:74F admitted with abdominal pain and exam findings consistent with peritonitis secondary to acute diverticulitis at the splenic flexure with evidence of contained perforation now s/p Exploratory laparotomy, left hemicolectomy, end colostomy, and ABThera Vac placement and s/p from RTOR for abdominal washout and Abthera vac placement, off pressors. TPN started 7/5 for Protein-Calorie Malnutrition    - TPN goal: Carbohydrates: 210grams, Amino Acid: 120grams, 40g lipids decreased from 2000 to 1200 ml      TPN stopped as pt tolerating / eating more - Dysphagia I DM      Calorie COunt Noted      ENCOURAGE PO- d/w daughter  - If ongoing nutritional need an ongoing issue- consider KO/ PEG for TFs as pt has a functional GI tract  - Elevated WBC -    Continue to monitor BASIM output      ID COnsult noted  - Strict Intake and Output.,   - Weights three times a week  - Monitor BMP, Mg, Ionized Ca, Phosphorus daily  - Pre-albumin weekly.  - Hyperglycemia- Continue FS w/basal & sliding scale coverage.   - Hyperkalemia -stabilized  - HyperNa- stabilized  - Anemia- pt is a Jehovah Witness- continue mngt per primary team, Hematology note appreciated       Improving with supplementation  - Continue as per Surgery d/w team, will follow with you    TPN team, pager [phone], spectra [phone]  D/w Adela Huggins & HERNAN Cross

## 2019-07-30 NOTE — PROVIDER CONTACT NOTE (OTHER) - DATE AND TIME:
02-Jul-2019 18:00
03-Jul-2019 13:50
07-Jul-2019 02:10
12-Jul-2019 00:40
12-Jul-2019 06:00
12-Jul-2019 20:50
16-Jul-2019 09:10
25-Jul-2019 04:42
28-Jul-2019 10:45
30-Jul-2019 16:23
27-Jul-2019 02:13
29-Jul-2019 14:30

## 2019-07-30 NOTE — PROVIDER CONTACT NOTE (OTHER) - NAME OF MD/NP/PA/DO NOTIFIED:
Bartolome GUSTAFSON
Crystal Copper
Dr. Nevarez ([place] surgery team)
JANAY Dave
JANAY Doss
JANAY Santillan
Jacey GUSTAFSON
MD Neil
Nicole Velez MD
Richard GUSTAFSON
MD Hess
MD Rubio

## 2019-07-30 NOTE — PROVIDER CONTACT NOTE (OTHER) - ASSESSMENT
Pt VSS, denies chest pain, sob, remains on cardiac monitor
Pt is A&Ox4, no c/o chest pain, SOB, or headache at this time.
Pt A&Ox4, vss, slightly tachy at baseline. No output from colostomy, +flatus, low urine output, pt voided 150ml during shift via primafit. no additional abd distention noted. pt complains of no pain at this time. wound vac wnl.
pt lactate elevated and rising, team aware. Fs >200 x 2 , Pt remains on lower levophed requirements at 0.35 , vaso and fentanyl gtt. Labs drawn q 2hr. Pt RASS-2, -3 on sedation. arousable to vigorous stimuli and pain
Pt clinically unchanged, VSS.
Pt insulin gtt turned off upon arrival to SICU. FS 65
Remote tele reported possible a-fib vs wide complex. Pt currently resting comfortably in bed, no s/s of discomfort or distress.
Resting in bed, lethargic but shakes head to yes/no questions. Precedex running at 0.2 (now off). Patients lopressor increased to 5mg this AM for increased HR, /66 at 0500, lopressor given at 0502
Resting in bed, not arounsing/following/withdrawing from pain at this time, HR 120s, BP stable see flowsheet
Resting in bed, patient following commands, vital signs otherwise stable
alert and oriented x4 VSS.

## 2019-07-31 LAB
ANION GAP SERPL CALC-SCNC: 17 MMOL/L — SIGNIFICANT CHANGE UP (ref 5–17)
BUN SERPL-MCNC: 24 MG/DL — HIGH (ref 7–23)
CALCIUM SERPL-MCNC: 8.4 MG/DL — SIGNIFICANT CHANGE UP (ref 8.4–10.5)
CHLORIDE SERPL-SCNC: 96 MMOL/L — SIGNIFICANT CHANGE UP (ref 96–108)
CO2 SERPL-SCNC: 23 MMOL/L — SIGNIFICANT CHANGE UP (ref 22–31)
CREAT SERPL-MCNC: 0.77 MG/DL — SIGNIFICANT CHANGE UP (ref 0.5–1.3)
GLUCOSE BLDC GLUCOMTR-MCNC: 100 MG/DL — HIGH (ref 70–99)
GLUCOSE BLDC GLUCOMTR-MCNC: 115 MG/DL — HIGH (ref 70–99)
GLUCOSE BLDC GLUCOMTR-MCNC: 125 MG/DL — HIGH (ref 70–99)
GLUCOSE BLDC GLUCOMTR-MCNC: 176 MG/DL — HIGH (ref 70–99)
GLUCOSE SERPL-MCNC: 94 MG/DL — SIGNIFICANT CHANGE UP (ref 70–99)
HCT VFR BLD CALC: 29.6 % — LOW (ref 34.5–45)
HGB BLD-MCNC: 9.6 G/DL — LOW (ref 11.5–15.5)
MCHC RBC-ENTMCNC: 32.5 GM/DL — SIGNIFICANT CHANGE UP (ref 32–36)
MCHC RBC-ENTMCNC: 34.2 PG — HIGH (ref 27–34)
MCV RBC AUTO: 105 FL — HIGH (ref 80–100)
PLATELET # BLD AUTO: 418 K/UL — HIGH (ref 150–400)
POTASSIUM SERPL-MCNC: 3.5 MMOL/L — SIGNIFICANT CHANGE UP (ref 3.5–5.3)
POTASSIUM SERPL-SCNC: 3.5 MMOL/L — SIGNIFICANT CHANGE UP (ref 3.5–5.3)
PROCALCITONIN SERPL-MCNC: 0.35 NG/ML — HIGH (ref 0.02–0.1)
RBC # BLD: 2.82 M/UL — LOW (ref 3.8–5.2)
RBC # FLD: 19.1 % — HIGH (ref 10.3–14.5)
SODIUM SERPL-SCNC: 136 MMOL/L — SIGNIFICANT CHANGE UP (ref 135–145)
VANCOMYCIN TROUGH SERPL-MCNC: 16.4 UG/ML — SIGNIFICANT CHANGE UP (ref 10–20)
WBC # BLD: 13.9 K/UL — HIGH (ref 3.8–10.5)
WBC # FLD AUTO: 13.9 K/UL — HIGH (ref 3.8–10.5)

## 2019-07-31 PROCEDURE — 99232 SBSQ HOSP IP/OBS MODERATE 35: CPT | Mod: GC

## 2019-07-31 PROCEDURE — 99232 SBSQ HOSP IP/OBS MODERATE 35: CPT

## 2019-07-31 RX ORDER — POTASSIUM CHLORIDE 20 MEQ
10 PACKET (EA) ORAL
Refills: 0 | Status: COMPLETED | OUTPATIENT
Start: 2019-07-31 | End: 2019-07-31

## 2019-07-31 RX ORDER — POTASSIUM CHLORIDE 20 MEQ
40 PACKET (EA) ORAL ONCE
Refills: 0 | Status: DISCONTINUED | OUTPATIENT
Start: 2019-07-31 | End: 2019-07-31

## 2019-07-31 RX ORDER — VANCOMYCIN HCL 1 G
1000 VIAL (EA) INTRAVENOUS ONCE
Refills: 0 | Status: COMPLETED | OUTPATIENT
Start: 2019-07-31 | End: 2019-07-31

## 2019-07-31 RX ORDER — VANCOMYCIN HCL 1 G
1000 VIAL (EA) INTRAVENOUS EVERY 12 HOURS
Refills: 0 | Status: DISCONTINUED | OUTPATIENT
Start: 2019-07-31 | End: 2019-08-01

## 2019-07-31 RX ORDER — VANCOMYCIN HCL 1 G
VIAL (EA) INTRAVENOUS
Refills: 0 | Status: DISCONTINUED | OUTPATIENT
Start: 2019-07-31 | End: 2019-08-01

## 2019-07-31 RX ADMIN — PANTOPRAZOLE SODIUM 40 MILLIGRAM(S): 20 TABLET, DELAYED RELEASE ORAL at 18:13

## 2019-07-31 RX ADMIN — Medication 100 MILLIEQUIVALENT(S): at 13:06

## 2019-07-31 RX ADMIN — MEROPENEM 100 MILLIGRAM(S): 1 INJECTION INTRAVENOUS at 05:11

## 2019-07-31 RX ADMIN — Medication 10 MILLIGRAM(S): at 18:14

## 2019-07-31 RX ADMIN — PANTOPRAZOLE SODIUM 40 MILLIGRAM(S): 20 TABLET, DELAYED RELEASE ORAL at 05:11

## 2019-07-31 RX ADMIN — Medication 500000 UNIT(S): at 05:11

## 2019-07-31 RX ADMIN — CHLORHEXIDINE GLUCONATE 1 APPLICATION(S): 213 SOLUTION TOPICAL at 11:19

## 2019-07-31 RX ADMIN — MEROPENEM 100 MILLIGRAM(S): 1 INJECTION INTRAVENOUS at 23:33

## 2019-07-31 RX ADMIN — Medication 650 MILLIGRAM(S): at 14:12

## 2019-07-31 RX ADMIN — Medication 3 MILLILITER(S): at 11:19

## 2019-07-31 RX ADMIN — Medication 500000 UNIT(S): at 18:14

## 2019-07-31 RX ADMIN — APIXABAN 5 MILLIGRAM(S): 2.5 TABLET, FILM COATED ORAL at 05:11

## 2019-07-31 RX ADMIN — Medication 650 MILLIGRAM(S): at 13:42

## 2019-07-31 RX ADMIN — Medication 250 MILLIGRAM(S): at 11:20

## 2019-07-31 RX ADMIN — MONTELUKAST 10 MILLIGRAM(S): 4 TABLET, CHEWABLE ORAL at 11:19

## 2019-07-31 RX ADMIN — Medication 3 MILLILITER(S): at 01:40

## 2019-07-31 RX ADMIN — Medication 100 MILLIEQUIVALENT(S): at 12:38

## 2019-07-31 RX ADMIN — SPIRONOLACTONE 25 MILLIGRAM(S): 25 TABLET, FILM COATED ORAL at 05:11

## 2019-07-31 RX ADMIN — Medication 0.5 MILLIGRAM(S): at 18:14

## 2019-07-31 RX ADMIN — Medication 40 MILLIGRAM(S): at 05:11

## 2019-07-31 RX ADMIN — ERYTHROPOIETIN 20000 UNIT(S): 10000 INJECTION, SOLUTION INTRAVENOUS; SUBCUTANEOUS at 11:20

## 2019-07-31 RX ADMIN — APIXABAN 5 MILLIGRAM(S): 2.5 TABLET, FILM COATED ORAL at 18:14

## 2019-07-31 RX ADMIN — Medication 3 MILLILITER(S): at 05:10

## 2019-07-31 RX ADMIN — Medication 3 MILLILITER(S): at 23:33

## 2019-07-31 RX ADMIN — Medication 3 MILLILITER(S): at 18:14

## 2019-07-31 RX ADMIN — SIMVASTATIN 20 MILLIGRAM(S): 20 TABLET, FILM COATED ORAL at 23:33

## 2019-07-31 RX ADMIN — Medication 325 MILLIGRAM(S): at 11:19

## 2019-07-31 RX ADMIN — Medication 250 MILLIGRAM(S): at 23:33

## 2019-07-31 RX ADMIN — Medication 10 MILLIGRAM(S): at 05:11

## 2019-07-31 RX ADMIN — MEROPENEM 100 MILLIGRAM(S): 1 INJECTION INTRAVENOUS at 13:06

## 2019-07-31 RX ADMIN — Medication 2: at 13:06

## 2019-07-31 RX ADMIN — Medication 0.5 MILLIGRAM(S): at 05:11

## 2019-07-31 RX ADMIN — Medication 100 MILLIEQUIVALENT(S): at 11:20

## 2019-07-31 RX ADMIN — INSULIN GLARGINE 20 UNIT(S): 100 INJECTION, SOLUTION SUBCUTANEOUS at 22:04

## 2019-07-31 NOTE — PROGRESS NOTE ADULT - ASSESSMENT
74 year old female PMHx of HFrEF s/p AICD, asthma, HTN, HLD, atrial fibrillation on eliquis who presented to [facility] on 6/30 with diverticulitis at splenic flexure with perforation on repeat scan on 7/1.  On 7/2 patient underwent Exploratory laparotomy, partial colectomy, end colostomy, and placement of temporary abdominal VAC dressing. Intraoperatively was noted of having perforation of distal transverse colon with feculent peritonitis. On 7/3 patient underwent Reopening of abdominal wound and an abdominal washout. On 7/9 patient RTOR for Abthera Vac placement and was found to have viable Edematous bowel and minimal pus-like fluid in pelvis which was sent for culture. On 7/10 hypotensive to SBP in the 80's and broadened to Imipenem. Wound cultures from 7/2/19 with E. coli, AHS, Strep mitis and Enterococcus faecalis (amp susceptible).    Overall, feculent peritonitis and resolved septic shock secondary to perforated diverticulitis.   7/9 surgical cultures with enterococcus. Imipenem offers coverage for ampicillin susceptible enterococcus   CoNS noted on 7/9 surgical cultures - suspect it is possible contaminant  7/10 blood cultures negative  7/11 RTOR with no evidence of purulence/infection noted  Patient developed cough but CT Chest (7/16) with mucous plugging - no evidence of pneumonia. RVP negative  CT A/P (7/16) with ascites but no abscess  Completed course of Imipenem    Given persistent leukocytosis BASIM drain cultures were checked - overall unclear significance of E. coli and CoNS on these cultures - they may reflect colonization of the BASIM drain as they have been in for longer than 2 weeks  CT C/A/P 7/28 with LLL PNA and no intraabdominal collections  Restarted on Vancomycin and Meropenem on 7/28    #LLL PNA - at risk for resistant organisms given prolonged imipenem course  --Continue Meropenem 1g IV Q8H  --Continue Vancomycin 1g IV Q12H. Check trough prior to fourth dose. if trough level 10-15  --Sputum Gram stain with moderate gram negative rods - would follow up on ID (if no gram positive growth tomorrow would discontinue vancomycin)    #Perforated Diverticulitis (Resolved)  --Continue to Monitor off of antibiotics    #Leukocytosis - likely related to LLL PNA vs. post-surgical  --Continue to trend    I will continue to follow. Please feel free to contact me with any further questions.    Rex Goode M.D.  [facility] Division of Infectious Disease  8AM-5PM: Pager Number [phone]  After Hours (or if no response): Please contact the Infectious Diseases Office at [phone]

## 2019-07-31 NOTE — PROGRESS NOTE ADULT - ASSESSMENT
A/P:74F admitted with abdominal pain and exam findings consistent with peritonitis secondary to acute diverticulitis at the splenic flexure with evidence of contained perforation now s/p Exploratory laparotomy, left hemicolectomy, end colostomy, and ABThera Vac placement and s/p from RTOR for abdominal washout and Abthera vac placement, off pressors. TPN started 7/5 for Protein-Calorie Malnutrition    - TPN goal: Carbohydrates: 210grams, Amino Acid: 120grams, 40g lipids decreased from 2000 to 1200 ml      TPN stopped as pt tolerating / eating more - Dysphagia I DM      Calorie COunt Noted      ENCOURAGE PO- d/w daughter      RD to speak to pt & daughter   - If ongoing nutritional need an ongoing issue- consider KO/ PEG for TFs as pt has a functional GI tract  - Elevated WBC  improving- Continuing to monitor BASIM output      ID COnsult noted  - Strict Intake and Output.,   - Weights three times a week  - Monitor BMP, Mg, Ionized Ca, Phosphorus daily  - Pre-albumin weekly.  - Hyperglycemia- Continue FS w/basal & sliding scale coverage.   - Hyperkalemia -stabilized  - HyperNa- stabilized  - Anemia- pt is a Jehovah Witness- continue mngt per primary team, Hematology note appreciated       Improving with supplementation  - Continue as per Surgery d/w team, will follow with you    TPN team, pager [phone], spectra [phone]  D/w Adela Huggins & HERNAN Cross

## 2019-07-31 NOTE — PROGRESS NOTE ADULT - ATTENDING COMMENTS
73 y/o F ([employer]) w/ HFrEF s/p CRT-D, Asthma, HTN, HLD, and A-fib on Eliquis who initially p/w severe, sudden onset L-sided abdominal pain and was found to have a proximal descending colon/splenic flexure acute diverticulitis with evidence of contained perforation s/p exploratory laparotomy, left hemicolectomy, end colostomy, and ABThera VAC placement on 7/1, s/p abdominal washout x3 (last 7/9) and return to OR on 7/11 for abdominal closure and Vac placement w/ hospital course c/b septic shock 2/2 perforated diverticulitis/feculent peritonitis, protein-calorie malnutrition requiring TPN, and new anemia.  Anemia likely multifactorial in the setting of septic shock, recent surgeries, and malnutrition.   She received IV iron and has adequate iron stores. Reticulocyte index indicates adequate response.   Continue Procrit on Mon/Wed/Fri (20,000U SQ) until hgb > 10. As she is improving the Procrit can be discontinued when she goes to rehab.

## 2019-07-31 NOTE — PROGRESS NOTE ADULT - SUBJECTIVE AND OBJECTIVE BOX
Team D Surgery Progress Note     SUBJECTIVE / 24H EVENTS  Patient seen and examined on morning rounds. No acute events overnight. Vanc trough was 18 last night so Vanco dose held.     OBJECTIVE:    VITAL SIGNS:  T(C): 36.4 (07-31-19 @ 05:07), Max: 36.7 (07-30-19 @ 16:45)  HR: 96 (07-31-19 @ 05:07) (56 - 108)  BP: 104/69 (07-31-19 @ 05:07) (94/56 - 118/77)  RR: 20 (07-31-19 @ 05:07) (16 - 20)  SpO2: 98% (07-31-19 @ 05:07) (97% - 100%)      POCT Blood Glucose.: 100 mg/dL (07-31-19 @ 08:02)  POCT Blood Glucose.: 149 mg/dL (07-30-19 @ 21:10)  POCT Blood Glucose.: 143 mg/dL (07-30-19 @ 18:28)      PHYSICAL EXAM:  General: alert and oriented, NAD  Resp: airway patent, respirations unlabored  CVS: regular rate and rhythm  Abdomen: soft,non tender, non distended. Wound vac in place and functioning, ostomy viable and functioning   Extremities: no edema  Skin: warm, dry, appropriate color      07-30-19 @ 07:01  -  07-31-19 @ 07:00  --------------------------------------------------------  IN:    Oral Fluid: 920 mL  Total IN: 920 mL    OUT:    Voided: 850 mL  Total OUT: 850 mL    Total NET: 70 mL          LAB VALUES:  07-30    133<L>  |  97  |  22  ----------------------------<  141<H>  3.9   |  24  |  0.81    Ca    8.2<L>      30 Jul 2019 12:20  Phos  2.3     07-30  Mg     1.8     07-30    TPro  x   /  Alb  2.9<L>  /  TBili  x   /  DBili  x   /  AST  x   /  ALT  x   /  AlkPhos  x   07-30                               10.0   15.1  )-----------( 480      ( 30 Jul 2019 08:58 )             31.9     LIVER FUNCTIONS - ( 30 Jul 2019 12:20 )  Alb: 2.9 g/dL / Pro: x     / ALK PHOS: x     / ALT: x     / AST: x     / GGT: x               MICROBIOLOGY:    No new microbiology data for review.     RADIOLOGY:    No new radiographic images for review.    MEDICATIONS  (STANDING):  ALBUTerol/ipratropium for Nebulization 3 milliLiter(s) Nebulizer every 6 hours  apixaban 5 milliGRAM(s) Oral every 12 hours  buDESOnide    Inhalation Suspension 0.5 milliGRAM(s) Inhalation every 12 hours  chlorhexidine 2% Cloths 1 Application(s) Topical daily  dextrose 50% Injectable 12.5 Gram(s) IV Push once  dextrose 50% Injectable 25 Gram(s) IV Push once  dextrose 50% Injectable 25 Gram(s) IV Push once  enalapril 10 milliGRAM(s) Oral every 12 hours  epoetin dejuan Injectable 25259 Unit(s) SubCutaneous <User Schedule>  ferrous    sulfate 325 milliGRAM(s) Oral daily  furosemide    Tablet 40 milliGRAM(s) Oral every 24 hours  insulin glargine Injectable (LANTUS) 20 Unit(s) SubCutaneous at bedtime  insulin lispro (HumaLOG) corrective regimen sliding scale   SubCutaneous three times a day before meals  insulin lispro (HumaLOG) corrective regimen sliding scale   SubCutaneous at bedtime  meropenem  IVPB 1000 milliGRAM(s) IV Intermittent every 8 hours  montelukast 10 milliGRAM(s) Oral every 24 hours  nystatin    Suspension 306107 Unit(s) Oral two times a day  pantoprazole    Tablet 40 milliGRAM(s) Oral two times a day  simvastatin 20 milliGRAM(s) Oral at bedtime  spironolactone 25 milliGRAM(s) Oral daily    MEDICATIONS  (PRN):  acetaminophen   Tablet .. 650 milliGRAM(s) Oral every 6 hours PRN Mild Pain (1 - 3)  dextrose 40% Gel 15 Gram(s) Oral once PRN Blood Glucose LESS THAN 70 milliGRAM(s)/deciliter  glucagon  Injectable 1 milliGRAM(s) IntraMuscular once PRN Glucose LESS THAN 70 milligrams/deciliter  sodium chloride 0.65% Nasal 1 Spray(s) Both Nostrils daily PRN Nasal Congestion [facility] Progress Note     SUBJECTIVE / 24H EVENTS  Patient seen and examined on morning rounds. No acute events overnight. Vanc trough was 18 last night so Vanco dose held.     OBJECTIVE:    VITAL SIGNS:  T(C): 36.4 (07-31-19 @ 05:07), Max: 36.7 (07-30-19 @ 16:45)  HR: 96 (07-31-19 @ 05:07) (56 - 108)  BP: 104/69 (07-31-19 @ 05:07) (94/56 - 118/77)  RR: 20 (07-31-19 @ 05:07) (16 - 20)  SpO2: 98% (07-31-19 @ 05:07) (97% - 100%)      POCT Blood Glucose.: 100 mg/dL (07-31-19 @ 08:02)  POCT Blood Glucose.: 149 mg/dL (07-30-19 @ 21:10)  POCT Blood Glucose.: 143 mg/dL (07-30-19 @ 18:28)      PHYSICAL EXAM:  General: alert and oriented, NAD  Resp: airway patent, respirations unlabored  CVS: regular rate and rhythm  Abdomen: soft,non tender, non distended. Wound vac in place and functioning, ostomy viable and functioning   Extremities: no edema  Skin: warm, dry, appropriate color      07-30-19 @ 07:01  -  07-31-19 @ 07:00  --------------------------------------------------------  IN:    Oral Fluid: 920 mL  Total IN: 920 mL    OUT:    Voided: 850 mL  Total OUT: 850 mL    Total NET: 70 mL          LAB VALUES:  07-30    133<L>  |  97  |  22  ----------------------------<  141<H>  3.9   |  24  |  0.81    Ca    8.2<L>      30 Jul 2019 12:20  Phos  2.3     07-30  Mg     1.8     07-30    TPro  x   /  Alb  2.9<L>  /  TBili  x   /  DBili  x   /  AST  x   /  ALT  x   /  AlkPhos  x   07-30                               10.0   15.1  )-----------( 480      ( 30 Jul 2019 08:58 )             31.9     LIVER FUNCTIONS - ( 30 Jul 2019 12:20 )  Alb: 2.9 g/dL / Pro: x     / ALK PHOS: x     / ALT: x     / AST: x     / GGT: x               MICROBIOLOGY:    No new microbiology data for review.     RADIOLOGY:    No new radiographic images for review.    MEDICATIONS  (STANDING):  ALBUTerol/ipratropium for Nebulization 3 milliLiter(s) Nebulizer every 6 hours  apixaban 5 milliGRAM(s) Oral every 12 hours  buDESOnide    Inhalation Suspension 0.5 milliGRAM(s) Inhalation every 12 hours  chlorhexidine 2% Cloths 1 Application(s) Topical daily  dextrose 50% Injectable 12.5 Gram(s) IV Push once  dextrose 50% Injectable 25 Gram(s) IV Push once  dextrose 50% Injectable 25 Gram(s) IV Push once  enalapril 10 milliGRAM(s) Oral every 12 hours  epoetin dejuan Injectable 30562 Unit(s) SubCutaneous <User Schedule>  ferrous    sulfate 325 milliGRAM(s) Oral daily  furosemide    Tablet 40 milliGRAM(s) Oral every 24 hours  insulin glargine Injectable (LANTUS) 20 Unit(s) SubCutaneous at bedtime  insulin lispro (HumaLOG) corrective regimen sliding scale   SubCutaneous three times a day before meals  insulin lispro (HumaLOG) corrective regimen sliding scale   SubCutaneous at bedtime  meropenem  IVPB 1000 milliGRAM(s) IV Intermittent every 8 hours  montelukast 10 milliGRAM(s) Oral every 24 hours  nystatin    Suspension 382135 Unit(s) Oral two times a day  pantoprazole    Tablet 40 milliGRAM(s) Oral two times a day  simvastatin 20 milliGRAM(s) Oral at bedtime  spironolactone 25 milliGRAM(s) Oral daily    MEDICATIONS  (PRN):  acetaminophen   Tablet .. 650 milliGRAM(s) Oral every 6 hours PRN Mild Pain (1 - 3)  dextrose 40% Gel 15 Gram(s) Oral once PRN Blood Glucose LESS THAN 70 milliGRAM(s)/deciliter  glucagon  Injectable 1 milliGRAM(s) IntraMuscular once PRN Glucose LESS THAN 70 milligrams/deciliter  sodium chloride 0.65% Nasal 1 Spray(s) Both Nostrils daily PRN Nasal Congestion

## 2019-07-31 NOTE — PROGRESS NOTE ADULT - SUBJECTIVE AND OBJECTIVE BOX
Follow Up:  LLL PNA,  Perforated Diverticulitis, Leukocytosis    Interval History: No chills or fevers. No abdominal pain or N/V/D.     REVIEW OF SYSTEMS  [  ] ROS unobtainable because:    [  x] All other systems negative except as noted below    Constitutional:  [ ] fever [ ] chills  [ ] weight loss  [ ] weakness  Skin:  [ ] rash [ ] phlebitis	  Eyes: [ ] icterus [ ] pain  [ ] discharge	  ENMT: [ ] sore throat  [ ] thrush [ ] ulcers [ ] exudates  Respiratory: [ ] dyspnea [ ] hemoptysis [ x] cough [ ] sputum	  Cardiovascular:  [ ] chest pain [ ] palpitations [ ] edema	  Gastrointestinal:  [ ] nausea [ ] vomiting [ ] diarrhea [ ] constipation [ x] pain	  Genitourinary:  [ ] dysuria [ ] frequency [ ] hematuria [ ] discharge [ ] flank pain  [ ] incontinence  Musculoskeletal:  [ ] myalgias [ ] arthralgias [ ] arthritis  [ ] back pain  Neurological:  [ ] headache [ ] seizures  [ ] confusion/altered mental status    Allergies  Coreg (Other)  digoxin (Other; Short breath (Mild to Mod))  penicillins (Hives)        ANTIMICROBIALS:  meropenem  IVPB 1000 every 8 hours  nystatin    Suspension [id number] two times a day  vancomycin  IVPB    vancomycin  IVPB 1000 every 12 hours      OTHER MEDS:  MEDICATIONS  (STANDING):  acetaminophen   Tablet .. 650 every 6 hours PRN  ALBUTerol/ipratropium for Nebulization 3 every 6 hours  apixaban 5 every 12 hours  buDESOnide    Inhalation Suspension 0.5 every 12 hours  dextrose 40% Gel 15 once PRN  dextrose 50% Injectable 12.5 once  dextrose 50% Injectable 25 once  dextrose 50% Injectable 25 once  enalapril 10 every 12 hours  epoetin dejuan Injectable 46747 <User Schedule>  furosemide    Tablet 40 every 24 hours  glucagon  Injectable 1 once PRN  insulin glargine Injectable (LANTUS) 20 at bedtime  insulin lispro (HumaLOG) corrective regimen sliding scale  three times a day before meals  insulin lispro (HumaLOG) corrective regimen sliding scale  at bedtime  montelukast 10 every 24 hours  pantoprazole    Tablet 40 two times a day  simvastatin 20 at bedtime  spironolactone 25 daily      Vital Signs Last 24 Hrs  T(C): 36.7 (31 Jul 2019 13:14), Max: 36.8 (31 Jul 2019 09:24)  T(F): 98.1 (31 Jul 2019 13:14), Max: 98.2 (31 Jul 2019 09:24)  HR: 104 (31 Jul 2019 13:14) (56 - 108)  BP: 100/67 (31 Jul 2019 13:14) (94/56 - 106/70)  BP(mean): --  RR: 18 (31 Jul 2019 13:14) (16 - 20)  SpO2: 99% (31 Jul 2019 13:14) (97% - 100%)    PHYSICAL EXAMINATION:  General: Awake and alert, NAD  HEENT: PERRL, EOMI, No subconjunctival hemorrhages, Oropharynx Clear, MMM  Neck: Supple, No SARAHI  Cardiac: RRR, No M/R/G  Resp: CTAB, No Wh/Rh/Ra  Abdomen: Obese, +RLQ ostomy with moderate liquid stool output. +midline wound vac with serosanguinous drainage, NBS, ND, minimal abdominal tenderness on palpation, No HSM, No rigidity or guarding  MSK: trace to 1+ LE edema and 1+ bilateral UE edema. No stigmata of IE. No evidence of phlebitis. No evidence of synovitis.  Skin: No rashes or lesions. Skin is warm and dry to the touch.   Neuro: Awake and alert. CN 2-12 Grossly intact. Moves all four extremities spontaneously.  Psych: Calm, pleasant cooperative                        9.6    13.9  )-----------( 418      ( 31 Jul 2019 08:25 )             29.6       07-31    136  |  96  |  24<H>  ----------------------------<  94  3.5   |  23  |  0.77    Ca    8.4      31 Jul 2019 08:25  Phos  2.3     07-30  Mg     1.8     07-30    TPro  x   /  Alb  2.9<L>  /  TBili  x   /  DBili  x   /  AST  x   /  ALT  x   /  AlkPhos  x   07-30          MICROBIOLOGY:  Vancomycin Level, Trough: 16.4 ug/mL (07-31-19 @ 08:25)  Vancomycin Level, Trough: 18.0 ug/mL (07-30-19 @ 19:47)  v  .Sputum  07-29-19   Moderate Gram Negative Rods  Normal Respiratory Liz present  --    Few polymorphonuclear leukocytes per low power field  Few Squamous epithelial cells per low power field  Moderate Gram Negative Rods per oil power field  Rare Yeast like cells per oil power field  Rare Gram Negative Diplococci per oil power field      .Urine  07-24-19   No growth  --  --      .Body Fluid  07-23-19   Numerous Escherichia coli  Few Coag Negative Staphylococcus  Numerous Escherichia coli #2  --  Escherichia coli  Escherichia coli      .Blood  07-10-19   No growth at 5 days.  --  --      .Blood  07-10-19   No growth at 5 days.  --  --      .Body Fluid abdominal fluid  07-09-19   Few Enterococcus faecalis  Few Coag Negative Staphylococcus  --  Enterococcus faecalis  Coag Negative Staphylococcus      .Body Fluid abdominal fluid  07-09-19   Few Enterococcus faecalis  Few Coag Negative Staphylococcus  --  Enterococcus faecalis  Coag Negative Staphylococcus      .Surgical Swab None Specimen Source:  abdomen, esw  07-02-19   Moderate Escherichia coli  Moderate Alpha hemolytic strep "Susceptibilities not performed"  Moderate Strep mitis oralis group "Susceptibilities not performed"  Growth in fluid media only Enterococcus faecalis  --  Escherichia coli  Enterococcus faecalis    RADIOLOGY:    EXAM:  CT ABDOMEN AND PELVIS OC IC                        EXAM:  CT CHEST IC                        PROCEDURE DATE:  07/28/2019  CT CHEST:   1.  Left lower lobe pneumonia .    CT ABDOMEN AND PELVIS:  1.  No bowel obstruction.  2.  Small amount of free fluid in the pelvis.

## 2019-07-31 NOTE — PROGRESS NOTE ADULT - ATTENDING COMMENTS
Slow improvement in nutrition  1.  protein calorie malnutrition--improved diet.  High quality protein (egg whites), glucerna shakes + food favorites from home.  Albumin up to nearly 3 from santi 1s

## 2019-07-31 NOTE — PROGRESS NOTE ADULT - ASSESSMENT
Patient is a 73 y/o F ([facility]) w/ HFrEF s/p CRT-D, Asthma, HTN, HLD, and A-fib on Eliquis who initially p/w severe, sudden onset L-sided abdominal pain and was found to have a proximal descending colon/splenic flexure acute diverticulitis with evidence of contained perforation s/p exploratory laparotomy, left hemicolectomy, end colostomy, and ABThera VAC placement on 7/1, s/p abdominal washout x3 (last 7/9) and return to OR on 7/11 for abdominal closure and Vac placement w/ hospital course c/b septic shock 2/2 perforated diverticulitis/feculent peritonitis, protein-calorie malnutrition requiring TPN, and new anemia. Hematology consulted for further evaluation.    Anemia likely multifactorial in the setting of septic shock, recent surgeries, and malnutrition.   - Iron studies were c/w AoCD (though patient had recent IV iron prior). Vitamin B12 and Folate were WNL. Reticulocyte index indicates adequate response.   - Continue Procrit on Mon/Wed/Fri (20,000 U SQ) until hgb > 10. As she is improving the Procrit can be discontinued when she goes to rehab.  - Patient is a [facility] and does not accept blood products. Recommend using pediatric tubes for blood draws to minimize blood loss  -Continue to monitor CBC

## 2019-07-31 NOTE — PROGRESS NOTE ADULT - ASSESSMENT
A/P: 73 yo Female with PMH of asthma, A fib, HFrEF with perforated diverticulitis s/p L hemicolectomy and end colostomy with VAC placement along with multiple washouts and closure with bridging mesh.  Doing well, tolerating dysphagia diet.  Leukocytosis (14 from 12), remains afebrile.    - Per Cards- OK to discharge with an increase in enalipril to 10mg PO BID. Please see Heart failure fellow note for detailed recs  - Continue discharge planning with case management for [facility]   - F/u sputum culture for ID recs regarding abx duration  - F/u w/ ID recommending 1g Vanco if trough between 10-15, held last night for vanc trough of 18, repeat this morning     [facility], [phone]

## 2019-07-31 NOTE — PROGRESS NOTE ADULT - SUBJECTIVE AND OBJECTIVE BOX
Patient is a 74y old  Female who presents with a chief complaint of Perforated diverticulitis (26 Jul 2019 04:15)    SUBJECTIVE / OVERNIGHT EVENTS:  Feeling better overall, although still weak.    MEDICATIONS  (STANDING):  ALBUTerol/ipratropium for Nebulization 3 milliLiter(s) Nebulizer every 6 hours  apixaban 5 milliGRAM(s) Oral every 12 hours  buDESOnide    Inhalation Suspension 0.5 milliGRAM(s) Inhalation every 12 hours  chlorhexidine 2% Cloths 1 Application(s) Topical daily  dextrose 5%. 1000 milliLiter(s) (50 mL/Hr) IV Continuous <Continuous>  dextrose 50% Injectable 12.5 Gram(s) IV Push once  dextrose 50% Injectable 25 Gram(s) IV Push once  dextrose 50% Injectable 25 Gram(s) IV Push once  enalapril 10 milliGRAM(s) Oral every 12 hours  epoetin dejuan Injectable 01598 Unit(s) SubCutaneous <User Schedule>  ferrous    sulfate 325 milliGRAM(s) Oral daily  furosemide    Tablet 40 milliGRAM(s) Oral every 24 hours  insulin glargine Injectable (LANTUS) 20 Unit(s) SubCutaneous at bedtime  insulin lispro (HumaLOG) corrective regimen sliding scale   SubCutaneous three times a day before meals  insulin lispro (HumaLOG) corrective regimen sliding scale   SubCutaneous at bedtime  montelukast 10 milliGRAM(s) Oral every 24 hours  nystatin    Suspension 794685 Unit(s) Oral two times a day  pantoprazole    Tablet 40 milliGRAM(s) Oral two times a day  simvastatin 20 milliGRAM(s) Oral at bedtime  spironolactone 25 milliGRAM(s) Oral daily    MEDICATIONS  (PRN):  acetaminophen   Tablet .. 650 milliGRAM(s) Oral every 6 hours PRN Mild Pain (1 - 3)  dextrose 40% Gel 15 Gram(s) Oral once PRN Blood Glucose LESS THAN 70 milliGRAM(s)/deciliter  glucagon  Injectable 1 milliGRAM(s) IntraMuscular once PRN Glucose LESS THAN 70 milligrams/deciliter  sodium chloride 0.65% Nasal 1 Spray(s) Both Nostrils daily PRN Nasal Congestion      Vital Signs Last 24 Hrs  T(C): 36.7 (31 Jul 2019 13:14), Max: 36.8 (31 Jul 2019 09:24)  T(F): 98.1 (31 Jul 2019 13:14), Max: 98.2 (31 Jul 2019 09:24)  HR: 104 (31 Jul 2019 13:14) (56 - 108)  BP: 100/67 (31 Jul 2019 13:14) (94/62 - 106/70)  BP(mean): --  RR: 18 (31 Jul 2019 13:14) (16 - 20)  SpO2: 99% (31 Jul 2019 13:14) (97% - 100%)    PHYSICAL EXAM:  GENERAL: NAD, Laying in bed  HEENT: NC/AT, Slightly dry mucous membranes  NECK: Supple  CHEST/LUNG: Grossly clear anteriorly, No wheeze appreciated  HEART: Slightly tachycardic, +S1/S2  ABDOMEN: + Wound vac in place, + RLQ ostomy w/ minimal stool output  EXTREMITIES: Improved B/L LE edema  NEUROLOGY: Awake and alert, Answering questions and following commands  SKIN: + Abdominal wound vac, Warm and dry  PSYCH: Calm    LABS:                                   9.6    13.9  )-----------( 418      ( 31 Jul 2019 08:25 )             29.6   07-31    136  |  96  |  24<H>  ----------------------------<  94  3.5   |  23  |  0.77    Ca    8.4      31 Jul 2019 08:25  Phos  2.3     07-30  Mg     1.8     07-30    TPro  x   /  Alb  2.9<L>  /  TBili  x   /  DBili  x   /  AST  x   /  ALT  x   /  AlkPhos  x   07-30      Ferritin, Serum: 1390 ng/mL (07.16.19 @ 16:51)        RADIOLOGY & ADDITIONAL TESTS:  Studies reviewed.

## 2019-07-31 NOTE — CHART NOTE - NSCHARTNOTESELECT_GEN_ALL_CORE
Nutrition Services
Event Note
Event Note/Infectious Diseases
Event Note/Post-op check
Event Note/Post-op note
Event Note/Pre-op Note
MICU Transfer Note
Nutrition Services
Post-Op Note
Pre-Op Note
Surgical Nutrition/Event Note
Transfer Note

## 2019-07-31 NOTE — PROGRESS NOTE ADULT - SUBJECTIVE AND OBJECTIVE BOX
[facility] NUTRITION SUPPORT / TPN -- FOLLOW UP NOTE  --------------------------------------------------------------------------------    24 hour events/subjective:  - Pt more awake/ alert- doesn't like the taste of food- reminded her importance of eating  - Tolerating dysphagia diet, eating more- daughter wants to bring in food favorites to help encourage  - No acute events overnight  - more alert later in the day, no n/v/d  - no f/c/s  - no cp/ palp/ sob  - Encouraged eating and oob to chair       Encourage ambulation and increased mobility/ pt being offloaded  - PT to follow- pt for MATT d/c        Diet:  Diet, Dysphagia 2 Mechanical Soft-Nectar Consistency Fluid (07-27-19 @ 14:43)      Appetite: [x  ]Poor [  ]Adequate [  ]Good  Caloric intake:  [   ]  Adequate   [ x  ] Inadequate    ROS: General/ GI see HPI  all other systems negative      ALLERGIES & MEDICATIONS  --------------------------------------------------------------------------------  ALLERGIES  Coreg (Other)  digoxin (Other; Short breath (Mild to Mod))  penicillins (Hives)    INTOLERANCES  metoprolol (Other)  Solu-Medrol (Other (Mild to Mod))      STANDING INPATIENT MEDICATIONS    ALBUTerol/ipratropium for Nebulization 3 milliLiter(s) Nebulizer every 6 hours  apixaban 5 milliGRAM(s) Oral every 12 hours  buDESOnide    Inhalation Suspension 0.5 milliGRAM(s) Inhalation every 12 hours  chlorhexidine 2% Cloths 1 Application(s) Topical daily  dextrose 50% Injectable 12.5 Gram(s) IV Push once  dextrose 50% Injectable 25 Gram(s) IV Push once  dextrose 50% Injectable 25 Gram(s) IV Push once  enalapril 10 milliGRAM(s) Oral every 12 hours  epoetin dejuan Injectable 41321 Unit(s) SubCutaneous <User Schedule>  ferrous    sulfate 325 milliGRAM(s) Oral daily  furosemide    Tablet 40 milliGRAM(s) Oral every 24 hours  insulin glargine Injectable (LANTUS) 20 Unit(s) SubCutaneous at bedtime  insulin lispro (HumaLOG) corrective regimen sliding scale   SubCutaneous three times a day before meals  insulin lispro (HumaLOG) corrective regimen sliding scale   SubCutaneous at bedtime  meropenem  IVPB 1000 milliGRAM(s) IV Intermittent every 8 hours  montelukast 10 milliGRAM(s) Oral every 24 hours  nystatin    Suspension 128308 Unit(s) Oral two times a day  pantoprazole    Tablet 40 milliGRAM(s) Oral two times a day  simvastatin 20 milliGRAM(s) Oral at bedtime  spironolactone 25 milliGRAM(s) Oral daily  vancomycin  IVPB 1000 milliGRAM(s) IV Intermittent every 12 hours      PRN INPATIENT MEDICATION  acetaminophen   Tablet .. 650 milliGRAM(s) Oral every 6 hours PRN  dextrose 40% Gel 15 Gram(s) Oral once PRN  glucagon  Injectable 1 milliGRAM(s) IntraMuscular once PRN  sodium chloride 0.65% Nasal 1 Spray(s) Both Nostrils daily PRN        VITALS/PHYSICAL EXAM  --------------------------------------------------------------------------------  T(C): 36.8 (07-31-19 @ 09:24), Max: 36.8 (07-31-19 @ 09:24)  HR: 102 (07-31-19 @ 09:24) (56 - 108)  BP: 94/62 (07-31-19 @ 09:24) (94/56 - 106/70)  RR: 18 (07-31-19 @ 09:24) (16 - 20)  SpO2: 97% (07-31-19 @ 09:24) (97% - 100%)  Wt(kg): --        07-30-19 @ 07:01  -  07-31-19 @ 07:00  --------------------------------------------------------  IN: 920 mL / OUT: 850 mL / NET: 70 mL    07-31-19 @ 07:01  -  07-31-19 @ 13:31  --------------------------------------------------------  IN: 120 mL / OUT: 150 mL / NET: -30 mL    Physical Exam:    	Gen: NAD, WD/ WN/ A&Ox3  	HEENT: NC/AT, PERRL, mucosa moist, supple neck, clear oropharynx  	Chest: clear  	GI: +softly distended, nontender, no BS                  (+)Ostomy pink & viable. liquid & soft formed stool                  VAC intact w/ good seal, drain w/ serous drainage              :  (+)Ching              MSK/Vascular: Passive ROM x4,  no clubbing, cyanosis, nor edema                   RUE PICC dressing C/D/I w/o SOI w/ no edema  	Neuro: Awake and alert  	Skin: Warm, good turgor, without rashes        LABS/ CULTURES/ RADIOLOGY:              9.6    13.9  >-----------<  418      [07-31-19 @ 08:25]              29.6     136  |  96  |  24  ----------------------------<  94      [07-31-19 @ 08:25]  3.5   |  23  |  0.77        Ca     8.4     [07-31-19 @ 08:25]      Mg     1.8     [07-30-19 @ 12:20]      Phos  2.3     [07-30-19 @ 12:20]    TPro  x   /  Alb  2.9  /  TBili  x   /  DBili  x   /  AST  x   /  ALT  x   /  AlkPhos  x   [07-30-19 @ 12:20]    CAPILLARY BLOOD GLUCOSE  POCT Blood Glucose.: 176 mg/dL (31 Jul 2019 13:03)  POCT Blood Glucose.: 100 mg/dL (31 Jul 2019 08:02)  POCT Blood Glucose.: 149 mg/dL (30 Jul 2019 21:10)  POCT Blood Glucose.: 143 mg/dL (30 Jul 2019 18:28)    Prealbumin, Serum: 12 mg/dL (07-24-19 @ 14:57)  Prealbumin, Serum: 5 mg/dL (07-04-19 @ 22:45)

## 2019-07-31 NOTE — CHART NOTE - NSCHARTNOTEFT_GEN_A_CORE
Verbal consult received re: education on dysphagia diet    Daughter had questions regarding compliance with dysphagia diet; she would like to bring food from home to encourage PO intake. Daughter has brought mashed potatoes but would like to expand food options. Reviewed options available, recommended oral supplements. Pt declined Glucerna stating it was too sweet. Diet was liberalized to dysphagia to include more options, confirmed with diet office. Educated daughter on consistency of food (meats and vegetables) if she would like to bring home cooked meals. Handout provided.

## 2019-08-01 LAB
-  AMIKACIN: SIGNIFICANT CHANGE UP
-  AZTREONAM: SIGNIFICANT CHANGE UP
-  CEFEPIME: SIGNIFICANT CHANGE UP
-  CEFTAZIDIME/AVIBACTAM: SIGNIFICANT CHANGE UP
-  CEFTAZIDIME: SIGNIFICANT CHANGE UP
-  CEFTOLOZANE/TAZOBACTAM: SIGNIFICANT CHANGE UP
-  CIPROFLOXACIN: SIGNIFICANT CHANGE UP
-  GENTAMICIN: SIGNIFICANT CHANGE UP
-  IMIPENEM: SIGNIFICANT CHANGE UP
-  LEVOFLOXACIN: SIGNIFICANT CHANGE UP
-  MEROPENEM: SIGNIFICANT CHANGE UP
-  PIPERACILLIN/TAZOBACTAM: SIGNIFICANT CHANGE UP
-  TOBRAMYCIN: SIGNIFICANT CHANGE UP
ANION GAP SERPL CALC-SCNC: 12 MMOL/L — SIGNIFICANT CHANGE UP (ref 5–17)
BUN SERPL-MCNC: 18 MG/DL — SIGNIFICANT CHANGE UP (ref 7–23)
CALCIUM SERPL-MCNC: 8.5 MG/DL — SIGNIFICANT CHANGE UP (ref 8.4–10.5)
CHLORIDE SERPL-SCNC: 97 MMOL/L — SIGNIFICANT CHANGE UP (ref 96–108)
CO2 SERPL-SCNC: 25 MMOL/L — SIGNIFICANT CHANGE UP (ref 22–31)
CREAT SERPL-MCNC: 0.7 MG/DL — SIGNIFICANT CHANGE UP (ref 0.5–1.3)
CULTURE RESULTS: SIGNIFICANT CHANGE UP
GLUCOSE BLDC GLUCOMTR-MCNC: 116 MG/DL — HIGH (ref 70–99)
GLUCOSE BLDC GLUCOMTR-MCNC: 117 MG/DL — HIGH (ref 70–99)
GLUCOSE BLDC GLUCOMTR-MCNC: 119 MG/DL — HIGH (ref 70–99)
GLUCOSE BLDC GLUCOMTR-MCNC: 88 MG/DL — SIGNIFICANT CHANGE UP (ref 70–99)
GLUCOSE SERPL-MCNC: 88 MG/DL — SIGNIFICANT CHANGE UP (ref 70–99)
HCT VFR BLD CALC: 30.4 % — LOW (ref 34.5–45)
HGB BLD-MCNC: 9.4 G/DL — LOW (ref 11.5–15.5)
MAGNESIUM SERPL-MCNC: 1.7 MG/DL — SIGNIFICANT CHANGE UP (ref 1.6–2.6)
MCHC RBC-ENTMCNC: 31 GM/DL — LOW (ref 32–36)
MCHC RBC-ENTMCNC: 32.5 PG — SIGNIFICANT CHANGE UP (ref 27–34)
MCV RBC AUTO: 105 FL — HIGH (ref 80–100)
METHOD TYPE: SIGNIFICANT CHANGE UP
ORGANISM # SPEC MICROSCOPIC CNT: SIGNIFICANT CHANGE UP
PHOSPHATE SERPL-MCNC: 2.1 MG/DL — LOW (ref 2.5–4.5)
PLATELET # BLD AUTO: 396 K/UL — SIGNIFICANT CHANGE UP (ref 150–400)
POTASSIUM SERPL-MCNC: 4 MMOL/L — SIGNIFICANT CHANGE UP (ref 3.5–5.3)
POTASSIUM SERPL-SCNC: 4 MMOL/L — SIGNIFICANT CHANGE UP (ref 3.5–5.3)
RBC # BLD: 2.9 M/UL — LOW (ref 3.8–5.2)
RBC # FLD: 18.9 % — HIGH (ref 10.3–14.5)
SODIUM SERPL-SCNC: 134 MMOL/L — LOW (ref 135–145)
SPECIMEN SOURCE: SIGNIFICANT CHANGE UP
WBC # BLD: 13.5 K/UL — HIGH (ref 3.8–10.5)
WBC # FLD AUTO: 13.5 K/UL — HIGH (ref 3.8–10.5)

## 2019-08-01 PROCEDURE — 99232 SBSQ HOSP IP/OBS MODERATE 35: CPT

## 2019-08-01 RX ADMIN — Medication 325 MILLIGRAM(S): at 12:59

## 2019-08-01 RX ADMIN — INSULIN GLARGINE 20 UNIT(S): 100 INJECTION, SOLUTION SUBCUTANEOUS at 22:00

## 2019-08-01 RX ADMIN — PANTOPRAZOLE SODIUM 40 MILLIGRAM(S): 20 TABLET, DELAYED RELEASE ORAL at 18:53

## 2019-08-01 RX ADMIN — Medication 3 MILLILITER(S): at 05:38

## 2019-08-01 RX ADMIN — Medication 40 MILLIGRAM(S): at 05:38

## 2019-08-01 RX ADMIN — Medication 3 MILLILITER(S): at 12:59

## 2019-08-01 RX ADMIN — SIMVASTATIN 20 MILLIGRAM(S): 20 TABLET, FILM COATED ORAL at 22:00

## 2019-08-01 RX ADMIN — MEROPENEM 100 MILLIGRAM(S): 1 INJECTION INTRAVENOUS at 05:38

## 2019-08-01 RX ADMIN — APIXABAN 5 MILLIGRAM(S): 2.5 TABLET, FILM COATED ORAL at 18:53

## 2019-08-01 RX ADMIN — SPIRONOLACTONE 25 MILLIGRAM(S): 25 TABLET, FILM COATED ORAL at 05:38

## 2019-08-01 RX ADMIN — Medication 10 MILLIGRAM(S): at 18:53

## 2019-08-01 RX ADMIN — Medication 0.5 MILLIGRAM(S): at 05:37

## 2019-08-01 RX ADMIN — Medication 500000 UNIT(S): at 18:53

## 2019-08-01 RX ADMIN — MONTELUKAST 10 MILLIGRAM(S): 4 TABLET, CHEWABLE ORAL at 13:00

## 2019-08-01 RX ADMIN — Medication 0.5 MILLIGRAM(S): at 18:53

## 2019-08-01 RX ADMIN — APIXABAN 5 MILLIGRAM(S): 2.5 TABLET, FILM COATED ORAL at 05:38

## 2019-08-01 RX ADMIN — CHLORHEXIDINE GLUCONATE 1 APPLICATION(S): 213 SOLUTION TOPICAL at 13:06

## 2019-08-01 RX ADMIN — PANTOPRAZOLE SODIUM 40 MILLIGRAM(S): 20 TABLET, DELAYED RELEASE ORAL at 05:38

## 2019-08-01 RX ADMIN — Medication 3 MILLILITER(S): at 18:53

## 2019-08-01 RX ADMIN — Medication 500000 UNIT(S): at 05:38

## 2019-08-01 RX ADMIN — Medication 10 MILLIGRAM(S): at 05:38

## 2019-08-01 NOTE — PROVIDER CONTACT NOTE (CRITICAL VALUE NOTIFICATION) - SITUATION
vanco 26.3
Sputum culture moderate pseudomas aeroginosa, carbapanem resistant
pt with elevated electrolytes from AM labs

## 2019-08-01 NOTE — PROGRESS NOTE ADULT - SUBJECTIVE AND OBJECTIVE BOX
Follow Up:  LLL PNA,  Perforated Diverticulitis, Leukocytosis    Interval History: Continued cough which is largely unchanged. Minimal abdominal pain. No N/V.     REVIEW OF SYSTEMS  [  ] ROS unobtainable because:    [ x ] All other systems negative except as noted below    Constitutional:  [ ] fever [ ] chills  [ ] weight loss  [ ] weakness  Skin:  [ ] rash [ ] phlebitis	  Eyes: [ ] icterus [ ] pain  [ ] discharge	  ENMT: [ ] sore throat  [ ] thrush [ ] ulcers [ ] exudates  Respiratory: [ ] dyspnea [ ] hemoptysis [x ] cough [ ] sputum	  Cardiovascular:  [ ] chest pain [ ] palpitations [ ] edema	  Gastrointestinal:  [ ] nausea [ ] vomiting [ ] diarrhea [ ] constipation [ x] pain	  Genitourinary:  [ ] dysuria [ ] frequency [ ] hematuria [ ] discharge [ ] flank pain  [ ] incontinence  Musculoskeletal:  [ ] myalgias [ ] arthralgias [ ] arthritis  [ ] back pain  Neurological:  [ ] headache [ ] seizures  [ ] confusion/altered mental status    Allergies  Coreg (Other)  digoxin (Other; Short breath (Mild to Mod))  penicillins (Hives)        ANTIMICROBIALS:  levoFLOXacin  Tablet 750 every 24 hours  nystatin    Suspension 265065 two times a day      OTHER MEDS:  MEDICATIONS  (STANDING):  acetaminophen   Tablet .. 650 every 6 hours PRN  ALBUTerol/ipratropium for Nebulization 3 every 6 hours  apixaban 5 every 12 hours  buDESOnide    Inhalation Suspension 0.5 every 12 hours  dextrose 40% Gel 15 once PRN  dextrose 50% Injectable 12.5 once  dextrose 50% Injectable 25 once  dextrose 50% Injectable 25 once  enalapril 10 every 12 hours  epoetin dejuan Injectable 78392 <User Schedule>  furosemide    Tablet 40 every 24 hours  glucagon  Injectable 1 once PRN  insulin glargine Injectable (LANTUS) 20 at bedtime  insulin lispro (HumaLOG) corrective regimen sliding scale  three times a day before meals  insulin lispro (HumaLOG) corrective regimen sliding scale  at bedtime  montelukast 10 every 24 hours  pantoprazole    Tablet 40 two times a day  simvastatin 20 at bedtime  spironolactone 25 daily      Vital Signs Last 24 Hrs  T(C): 37.3 (01 Aug 2019 18:45), Max: 37.3 (01 Aug 2019 18:45)  T(F): 99.1 (01 Aug 2019 18:45), Max: 99.1 (01 Aug 2019 18:45)  HR: 104 (01 Aug 2019 18:45) (90 - 109)  BP: 106/70 (01 Aug 2019 18:45) (100/62 - 112/73)  BP(mean): --  RR: 16 (01 Aug 2019 18:45) (16 - 18)  SpO2: 98% (01 Aug 2019 18:45) (94% - 99%)    PHYSICAL EXAMINATION:  General: Awake and alert, NAD  HEENT: PERRL, EOMI, No subconjunctival hemorrhages, Oropharynx Clear, MMM  Neck: Supple, No SARAHI  Cardiac: RRR, No M/R/G  Resp: CTAB, No Wh/Rh/Ra  Abdomen: Obese, +RLQ ostomy with moderate liquid stool output. +midline wound vac with serosanguinous drainage, NBS, ND, minimal abdominal tenderness on palpation, No HSM, No rigidity or guarding  MSK: trace to 1+ LE edema and 1+ bilateral UE edema. No stigmata of IE. No evidence of phlebitis. No evidence of synovitis.  Skin: No rashes or lesions. Skin is warm and dry to the touch.   Neuro: Awake and alert. CN 2-12 Grossly intact. Moves all four extremities spontaneously.  Psych: Calm, pleasant cooperative                          9.4    13.5  )-----------( 396      ( 01 Aug 2019 09:02 )             30.4       08-01    134<L>  |  97  |  18  ----------------------------<  88  4.0   |  25  |  0.70    Ca    8.5      01 Aug 2019 09:02  Phos  2.1     08-01  Mg     1.7     08-01            MICROBIOLOGY:  v  .Sputum  07-29-19   Moderate Pseudomonas aeruginosa (Carbapenem Resistant) #2  Normal Respiratory Liz present  --  Pseudomonas aeruginosa (Carbapenem Resistant)      .Urine  07-24-19   No growth  --  --      .Body Fluid  07-23-19   Numerous Escherichia coli  Few Coag Negative Staphylococcus  Numerous Escherichia coli #2  --  Escherichia coli  Escherichia coli      .Blood  07-10-19   No growth at 5 days.  --  --      .Blood  07-10-19   No growth at 5 days.  --  --      .Body Fluid abdominal fluid  07-09-19   Few Enterococcus faecalis  Few Coag Negative Staphylococcus  --  Enterococcus faecalis  Coag Negative Staphylococcus      .Body Fluid abdominal fluid  07-09-19   Few Enterococcus faecalis  Few Coag Negative Staphylococcus  --  Enterococcus faecalis  Coag Negative Staphylococcus    RADIOLOGY:    EXAM:  CT ABDOMEN AND PELVIS OC IC                        EXAM:  CT CHEST IC                        PROCEDURE DATE:  07/28/2019  CT CHEST:   1.  Left lower lobe pneumonia .    CT ABDOMEN AND PELVIS:  1.  No bowel obstruction.  2.  Small amount of free fluid in the pelvis.

## 2019-08-01 NOTE — PROGRESS NOTE ADULT - SUBJECTIVE AND OBJECTIVE BOX
Green Team Surgery Progress Note     SUBJECTIVE / 24H EVENTS  Patient seen and examined on morning rounds. No acute events overnight.    OBJECTIVE:    VITAL SIGNS:  T(C): 36.4 (08-01-19 @ 01:11), Max: 37 (07-31-19 @ 21:20)  HR: 98 (08-01-19 @ 01:11) (90 - 104)  BP: 105/67 (08-01-19 @ 01:11) (94/62 - 106/68)  RR: 16 (08-01-19 @ 01:11) (16 - 20)  SpO2: 98% (08-01-19 @ 01:11) (94% - 99%)      POCT Blood Glucose.: 125 mg/dL (07-31-19 @ 21:22)  POCT Blood Glucose.: 115 mg/dL (07-31-19 @ 18:10)  POCT Blood Glucose.: 176 mg/dL (07-31-19 @ 13:03)      PHYSICAL EXAM:  General: alert and oriented, NAD  Resp: airway patent, respirations unlabored  CVS: regular rate and rhythm  Abdomen: soft,non tender, non distended. Wound vac in place and functioning, ostomy viable and functioning   Extremities: WWP  Skin: warm, dry, appropriate color      07-30-19 @ 07:01  -  07-31-19 @ 07:00  --------------------------------------------------------  IN:    Oral Fluid: 920 mL  Total IN: 920 mL    OUT:    Voided: 850 mL  Total OUT: 850 mL    Total NET: 70 mL      07-31-19 @ 07:01  -  08-01-19 @ 04:51  --------------------------------------------------------  IN:    Oral Fluid: 660 mL  Total IN: 660 mL    OUT:    Colostomy: 50 mL    Voided: 1100 mL  Total OUT: 1150 mL    Total NET: -490 mL          LAB VALUES:  07-31    136  |  96  |  24<H>  ----------------------------<  94  3.5   |  23  |  0.77    Ca    8.4      31 Jul 2019 08:25  Phos  2.3     07-30  Mg     1.8     07-30    TPro  x   /  Alb  2.9<L>  /  TBili  x   /  DBili  x   /  AST  x   /  ALT  x   /  AlkPhos  x   07-30                               9.6    13.9  )-----------( 418      ( 31 Jul 2019 08:25 )             29.6     LIVER FUNCTIONS - ( 30 Jul 2019 12:20 )  Alb: 2.9 g/dL / Pro: x     / ALK PHOS: x     / ALT: x     / AST: x     / GGT: x               MICROBIOLOGY:    No new microbiology data for review.     RADIOLOGY:    No new radiographic images for review.    MEDICATIONS  (STANDING):  ALBUTerol/ipratropium for Nebulization 3 milliLiter(s) Nebulizer every 6 hours  apixaban 5 milliGRAM(s) Oral every 12 hours  buDESOnide    Inhalation Suspension 0.5 milliGRAM(s) Inhalation every 12 hours  chlorhexidine 2% Cloths 1 Application(s) Topical daily  dextrose 50% Injectable 12.5 Gram(s) IV Push once  dextrose 50% Injectable 25 Gram(s) IV Push once  dextrose 50% Injectable 25 Gram(s) IV Push once  enalapril 10 milliGRAM(s) Oral every 12 hours  epoetin dejuan Injectable 89837 Unit(s) SubCutaneous <User Schedule>  ferrous    sulfate 325 milliGRAM(s) Oral daily  furosemide    Tablet 40 milliGRAM(s) Oral every 24 hours  insulin glargine Injectable (LANTUS) 20 Unit(s) SubCutaneous at bedtime  insulin lispro (HumaLOG) corrective regimen sliding scale   SubCutaneous three times a day before meals  insulin lispro (HumaLOG) corrective regimen sliding scale   SubCutaneous at bedtime  meropenem  IVPB 1000 milliGRAM(s) IV Intermittent every 8 hours  montelukast 10 milliGRAM(s) Oral every 24 hours  nystatin    Suspension 821447 Unit(s) Oral two times a day  pantoprazole    Tablet 40 milliGRAM(s) Oral two times a day  simvastatin 20 milliGRAM(s) Oral at bedtime  spironolactone 25 milliGRAM(s) Oral daily  vancomycin  IVPB      vancomycin  IVPB 1000 milliGRAM(s) IV Intermittent every 12 hours    MEDICATIONS  (PRN):  acetaminophen   Tablet .. 650 milliGRAM(s) Oral every 6 hours PRN Mild Pain (1 - 3)  dextrose 40% Gel 15 Gram(s) Oral once PRN Blood Glucose LESS THAN 70 milliGRAM(s)/deciliter  glucagon  Injectable 1 milliGRAM(s) IntraMuscular once PRN Glucose LESS THAN 70 milligrams/deciliter  sodium chloride 0.65% Nasal 1 Spray(s) Both Nostrils daily PRN Nasal Congestion

## 2019-08-01 NOTE — PROGRESS NOTE ADULT - NSHPATTENDINGPLANDISCUSS_GEN_ALL_CORE
Surgical Team
ASHER GUSTAFSON
Surgical Team
ASHER GUSTAFSON
Dr Alvarado
Green Surgery PA (by phone)
Green Team Surgery Resident
MICU team
SICU team
SICU team attending
SICU team, TPN/nutrition teams
SICU team, attending
SICU team, attending and TPN/nutrition team
SICU team, attending and TPN/nutrition team
Surgical Team
patient
patient and daughter
surgery team
surgery team
surgery, TPN/nutrition teams
surgery, TPN/nutrition teams
team
surgery team
SICU team
SICU team
SICU team, attending
primary team
primary team

## 2019-08-01 NOTE — PROVIDER CONTACT NOTE (CRITICAL VALUE NOTIFICATION) - TEST AND RESULT REPORTED:
K+ above 9.0
Fluid cx 7/23 Numerous polymorphonuclear leukocytes seen per low power field  Numerous Gram Negative Rods seen per oil power field
Sputum culture moderate pseudomas aeroginosa, carbapanem resistant
vanco 26.3

## 2019-08-01 NOTE — PROVIDER CONTACT NOTE (CRITICAL VALUE NOTIFICATION) - BACKGROUND
Pt with extensive hospital stay, multiple abdominal washouts
7/2pt is s/p ex lap partial colectomy colostomy and wound vac plus several washouts.
pt with long hospital stay, bowel resection and colostomy placement

## 2019-08-01 NOTE — PROGRESS NOTE ADULT - ATTENDING COMMENTS
I have seen and examined the patient. I agree with the above surgery resident's note. I have seen and evaluated the patient and discussed the relevant clinical findings and plan with the surgical housestaff and fellow.  Agree with the above documentation with addenda as noted.         Clark Alvarado MD

## 2019-08-01 NOTE — PROGRESS NOTE ADULT - I WAS PHYSICALLY PRESENT FOR THE KEY PORTIONS OF THE EVALUATION AND MANAGEMENT (E/M) SERVICE PROVIDED.  I AGREE WITH THE ABOVE HISTORY, PHYSICAL, AND PLAN WHICH I HAVE REVIEWED AND EDITED WHERE APPROPRIATE

## 2019-08-01 NOTE — PROGRESS NOTE ADULT - ASSESSMENT
A/P: 73 yo Female with PMH of asthma, A fib, HFrEF with perforated diverticulitis s/p L hemicolectomy and end colostomy with VAC placement along with multiple washouts and closure with bridging mesh.  Doing well, tolerating dysphagia diet.  Leukocytosis (14 from 12), remains afebrile.    - Per Cards- OK to discharge on enalipril to 10mg PO BID. Please see Heart failure fellow note for detailed recs  - Continue discharge planning with case management to rehab, [facility]'t accept  - F/u sputum culture, if no GP growth d/c Vanco  - F/u w/ ID recommending 1g Vanco q12h, 1g meropenem q8h  - Heme recommending continued procrit to Hgb > 10  - Dispo: Rehab pending ID PO Abx recs    [facility], [phone]

## 2019-08-01 NOTE — PROGRESS NOTE ADULT - ASSESSMENT
74 year old female PMHx of HFrEF s/p AICD, asthma, HTN, HLD, atrial fibrillation on eliquis who presented to [facility] on 6/30 with diverticulitis at splenic flexure with perforation on repeat scan on 7/1.  On 7/2 patient underwent Exploratory laparotomy, partial colectomy, end colostomy, and placement of temporary abdominal VAC dressing. Intraoperatively was noted of having perforation of distal transverse colon with feculent peritonitis. On 7/3 patient underwent Reopening of abdominal wound and an abdominal washout. On 7/9 patient RTOR for Abthera Vac placement and was found to have viable Edematous bowel and minimal pus-like fluid in pelvis which was sent for culture. On 7/10 hypotensive to SBP in the 80's and broadened to Imipenem. Wound cultures from 7/2/19 with E. coli, AHS, Strep mitis and Enterococcus faecalis (amp susceptible).    Overall, feculent peritonitis and resolved septic shock secondary to perforated diverticulitis.   7/9 surgical cultures with enterococcus. Imipenem offers coverage for ampicillin susceptible enterococcus   CoNS noted on 7/9 surgical cultures - suspect it is possible contaminant  7/10 blood cultures negative  7/11 RTOR with no evidence of purulence/infection noted  Patient developed cough but CT Chest (7/16) with mucous plugging - no evidence of pneumonia. RVP negative  CT A/P (7/16) with ascites but no abscess  Completed course of Imipenem    Given persistent leukocytosis BASIM drain cultures were checked - overall unclear significance of E. coli and CoNS on these cultures - they may reflect colonization of the BASIM drain as they have been in for longer than 2 weeks  CT C/A/P 7/28 with LLL PNA and no intraabdominal collections  Sputum Culture now with Carbapenem resistant pseudomonas which is Fluoroquinolone susceptible (EKG QTc < 500)  Would stop Vancomycin and Meropenem and switch to Levofloxacin     #LLL PNA   --Stop Vancomycin and Meropenem  --Start Levofloxacin 750 mg PO Q24H.    #Perforated Diverticulitis (Resolved)    #Leukocytosis - likely related to LLL PNA vs. post-surgical  --Continue to trend    I will continue to follow. Please feel free to contact me with any further questions.    Rex Goode M.D.  [facility] Division of Infectious Disease  8AM-5PM: Pager Number [phone]  After Hours (or if no response): Please contact the Infectious Diseases Office at [phone]

## 2019-08-02 ENCOUNTER — TRANSCRIPTION ENCOUNTER (OUTPATIENT)
Age: 74
End: 2019-08-02

## 2019-08-02 VITALS
OXYGEN SATURATION: 98 % | HEART RATE: 105 BPM | SYSTOLIC BLOOD PRESSURE: 96 MMHG | TEMPERATURE: 98 F | RESPIRATION RATE: 17 BRPM | DIASTOLIC BLOOD PRESSURE: 63 MMHG

## 2019-08-02 LAB
ANION GAP SERPL CALC-SCNC: 14 MMOL/L — SIGNIFICANT CHANGE UP (ref 5–17)
BUN SERPL-MCNC: 19 MG/DL — SIGNIFICANT CHANGE UP (ref 7–23)
CALCIUM SERPL-MCNC: 9.2 MG/DL — SIGNIFICANT CHANGE UP (ref 8.4–10.5)
CHLORIDE SERPL-SCNC: 94 MMOL/L — LOW (ref 96–108)
CO2 SERPL-SCNC: 23 MMOL/L — SIGNIFICANT CHANGE UP (ref 22–31)
CREAT SERPL-MCNC: 0.88 MG/DL — SIGNIFICANT CHANGE UP (ref 0.5–1.3)
GLUCOSE BLDC GLUCOMTR-MCNC: 125 MG/DL — HIGH (ref 70–99)
GLUCOSE BLDC GLUCOMTR-MCNC: 153 MG/DL — HIGH (ref 70–99)
GLUCOSE SERPL-MCNC: 133 MG/DL — HIGH (ref 70–99)
HCT VFR BLD CALC: 34.8 % — SIGNIFICANT CHANGE UP (ref 34.5–45)
HGB BLD-MCNC: 10.4 G/DL — LOW (ref 11.5–15.5)
MAGNESIUM SERPL-MCNC: 1.6 MG/DL — SIGNIFICANT CHANGE UP (ref 1.6–2.6)
MCHC RBC-ENTMCNC: 30 GM/DL — LOW (ref 32–36)
MCHC RBC-ENTMCNC: 31.5 PG — SIGNIFICANT CHANGE UP (ref 27–34)
MCV RBC AUTO: 105 FL — HIGH (ref 80–100)
PHOSPHATE SERPL-MCNC: 2.2 MG/DL — LOW (ref 2.5–4.5)
PLATELET # BLD AUTO: 422 K/UL — HIGH (ref 150–400)
POTASSIUM SERPL-MCNC: 4 MMOL/L — SIGNIFICANT CHANGE UP (ref 3.5–5.3)
POTASSIUM SERPL-SCNC: 4 MMOL/L — SIGNIFICANT CHANGE UP (ref 3.5–5.3)
RBC # BLD: 3.31 M/UL — LOW (ref 3.8–5.2)
RBC # FLD: 18.8 % — HIGH (ref 10.3–14.5)
SODIUM SERPL-SCNC: 131 MMOL/L — LOW (ref 135–145)
WBC # BLD: 14.3 K/UL — HIGH (ref 3.8–10.5)
WBC # FLD AUTO: 14.3 K/UL — HIGH (ref 3.8–10.5)

## 2019-08-02 PROCEDURE — 82668 ASSAY OF ERYTHROPOIETIN: CPT

## 2019-08-02 PROCEDURE — 71045 X-RAY EXAM CHEST 1 VIEW: CPT

## 2019-08-02 PROCEDURE — 94002 VENT MGMT INPAT INIT DAY: CPT

## 2019-08-02 PROCEDURE — 85384 FIBRINOGEN ACTIVITY: CPT

## 2019-08-02 PROCEDURE — 83540 ASSAY OF IRON: CPT

## 2019-08-02 PROCEDURE — 85048 AUTOMATED LEUKOCYTE COUNT: CPT

## 2019-08-02 PROCEDURE — 99285 EMERGENCY DEPT VISIT HI MDM: CPT | Mod: 25

## 2019-08-02 PROCEDURE — 82565 ASSAY OF CREATININE: CPT

## 2019-08-02 PROCEDURE — 71275 CT ANGIOGRAPHY CHEST: CPT

## 2019-08-02 PROCEDURE — 96374 THER/PROPH/DIAG INJ IV PUSH: CPT | Mod: XU

## 2019-08-02 PROCEDURE — 83735 ASSAY OF MAGNESIUM: CPT

## 2019-08-02 PROCEDURE — 87040 BLOOD CULTURE FOR BACTERIA: CPT

## 2019-08-02 PROCEDURE — 83615 LACTATE (LD) (LDH) ENZYME: CPT

## 2019-08-02 PROCEDURE — 82746 ASSAY OF FOLIC ACID SERUM: CPT

## 2019-08-02 PROCEDURE — 87086 URINE CULTURE/COLONY COUNT: CPT

## 2019-08-02 PROCEDURE — 82040 ASSAY OF SERUM ALBUMIN: CPT

## 2019-08-02 PROCEDURE — 74177 CT ABD & PELVIS W/CONTRAST: CPT

## 2019-08-02 PROCEDURE — 97163 PT EVAL HIGH COMPLEX 45 MIN: CPT

## 2019-08-02 PROCEDURE — 80048 BASIC METABOLIC PNL TOTAL CA: CPT

## 2019-08-02 PROCEDURE — 83880 ASSAY OF NATRIURETIC PEPTIDE: CPT

## 2019-08-02 PROCEDURE — C8929: CPT

## 2019-08-02 PROCEDURE — 92610 EVALUATE SWALLOWING FUNCTION: CPT

## 2019-08-02 PROCEDURE — 96375 TX/PRO/DX INJ NEW DRUG ADDON: CPT

## 2019-08-02 PROCEDURE — 82330 ASSAY OF CALCIUM: CPT

## 2019-08-02 PROCEDURE — 94660 CPAP INITIATION&MGMT: CPT

## 2019-08-02 PROCEDURE — 82607 VITAMIN B-12: CPT

## 2019-08-02 PROCEDURE — 82570 ASSAY OF URINE CREATININE: CPT

## 2019-08-02 PROCEDURE — 87633 RESP VIRUS 12-25 TARGETS: CPT

## 2019-08-02 PROCEDURE — 84295 ASSAY OF SERUM SODIUM: CPT

## 2019-08-02 PROCEDURE — 84300 ASSAY OF URINE SODIUM: CPT

## 2019-08-02 PROCEDURE — 97530 THERAPEUTIC ACTIVITIES: CPT

## 2019-08-02 PROCEDURE — 92526 ORAL FUNCTION THERAPY: CPT

## 2019-08-02 PROCEDURE — 36569 INSJ PICC 5 YR+ W/O IMAGING: CPT

## 2019-08-02 PROCEDURE — 80076 HEPATIC FUNCTION PANEL: CPT

## 2019-08-02 PROCEDURE — 97602 WOUND(S) CARE NON-SELECTIVE: CPT

## 2019-08-02 PROCEDURE — 84134 ASSAY OF PREALBUMIN: CPT

## 2019-08-02 PROCEDURE — 36600 WITHDRAWAL OF ARTERIAL BLOOD: CPT

## 2019-08-02 PROCEDURE — 87070 CULTURE OTHR SPECIMN AEROBIC: CPT

## 2019-08-02 PROCEDURE — 83690 ASSAY OF LIPASE: CPT

## 2019-08-02 PROCEDURE — 94640 AIRWAY INHALATION TREATMENT: CPT

## 2019-08-02 PROCEDURE — 94664 DEMO&/EVAL PT USE INHALER: CPT

## 2019-08-02 PROCEDURE — 84484 ASSAY OF TROPONIN QUANT: CPT

## 2019-08-02 PROCEDURE — 87486 CHLMYD PNEUM DNA AMP PROBE: CPT

## 2019-08-02 PROCEDURE — 84145 PROCALCITONIN (PCT): CPT

## 2019-08-02 PROCEDURE — 84100 ASSAY OF PHOSPHORUS: CPT

## 2019-08-02 PROCEDURE — 84540 ASSAY OF URINE/UREA-N: CPT

## 2019-08-02 PROCEDURE — 85610 PROTHROMBIN TIME: CPT

## 2019-08-02 PROCEDURE — 82803 BLOOD GASES ANY COMBINATION: CPT

## 2019-08-02 PROCEDURE — 87581 M.PNEUMON DNA AMP PROBE: CPT

## 2019-08-02 PROCEDURE — 97164 PT RE-EVAL EST PLAN CARE: CPT

## 2019-08-02 PROCEDURE — C1889: CPT

## 2019-08-02 PROCEDURE — 85730 THROMBOPLASTIN TIME PARTIAL: CPT

## 2019-08-02 PROCEDURE — 99261: CPT

## 2019-08-02 PROCEDURE — 84132 ASSAY OF SERUM POTASSIUM: CPT

## 2019-08-02 PROCEDURE — 80061 LIPID PANEL: CPT

## 2019-08-02 PROCEDURE — 82248 BILIRUBIN DIRECT: CPT

## 2019-08-02 PROCEDURE — 82553 CREATINE MB FRACTION: CPT

## 2019-08-02 PROCEDURE — 99231 SBSQ HOSP IP/OBS SF/LOW 25: CPT

## 2019-08-02 PROCEDURE — 96376 TX/PRO/DX INJ SAME DRUG ADON: CPT

## 2019-08-02 PROCEDURE — 74176 CT ABD & PELVIS W/O CONTRAST: CPT

## 2019-08-02 PROCEDURE — 83935 ASSAY OF URINE OSMOLALITY: CPT

## 2019-08-02 PROCEDURE — 87205 SMEAR GRAM STAIN: CPT

## 2019-08-02 PROCEDURE — 87798 DETECT AGENT NOS DNA AMP: CPT

## 2019-08-02 PROCEDURE — 85027 COMPLETE CBC AUTOMATED: CPT

## 2019-08-02 PROCEDURE — 82435 ASSAY OF BLOOD CHLORIDE: CPT

## 2019-08-02 PROCEDURE — 74230 X-RAY XM SWLNG FUNCJ C+: CPT

## 2019-08-02 PROCEDURE — 87075 CULTR BACTERIA EXCEPT BLOOD: CPT

## 2019-08-02 PROCEDURE — 94770: CPT

## 2019-08-02 PROCEDURE — 93005 ELECTROCARDIOGRAM TRACING: CPT

## 2019-08-02 PROCEDURE — 82947 ASSAY GLUCOSE BLOOD QUANT: CPT

## 2019-08-02 PROCEDURE — 92611 MOTION FLUOROSCOPY/SWALLOW: CPT

## 2019-08-02 PROCEDURE — 84478 ASSAY OF TRIGLYCERIDES: CPT

## 2019-08-02 PROCEDURE — 85014 HEMATOCRIT: CPT

## 2019-08-02 PROCEDURE — 71260 CT THORAX DX C+: CPT

## 2019-08-02 PROCEDURE — 84133 ASSAY OF URINE POTASSIUM: CPT

## 2019-08-02 PROCEDURE — 82728 ASSAY OF FERRITIN: CPT

## 2019-08-02 PROCEDURE — 81001 URINALYSIS AUTO W/SCOPE: CPT

## 2019-08-02 PROCEDURE — P9047: CPT

## 2019-08-02 PROCEDURE — 97110 THERAPEUTIC EXERCISES: CPT

## 2019-08-02 PROCEDURE — 84466 ASSAY OF TRANSFERRIN: CPT

## 2019-08-02 PROCEDURE — 83605 ASSAY OF LACTIC ACID: CPT

## 2019-08-02 PROCEDURE — 86901 BLOOD TYPING SEROLOGIC RH(D): CPT

## 2019-08-02 PROCEDURE — 80053 COMPREHEN METABOLIC PANEL: CPT

## 2019-08-02 PROCEDURE — 94003 VENT MGMT INPAT SUBQ DAY: CPT

## 2019-08-02 PROCEDURE — 97116 GAIT TRAINING THERAPY: CPT

## 2019-08-02 PROCEDURE — 88307 TISSUE EXAM BY PATHOLOGIST: CPT

## 2019-08-02 PROCEDURE — 82962 GLUCOSE BLOOD TEST: CPT

## 2019-08-02 PROCEDURE — 86900 BLOOD TYPING SEROLOGIC ABO: CPT

## 2019-08-02 PROCEDURE — 83550 IRON BINDING TEST: CPT

## 2019-08-02 PROCEDURE — 80202 ASSAY OF VANCOMYCIN: CPT

## 2019-08-02 PROCEDURE — 97606 NEG PRS WND THER DME>50 SQCM: CPT

## 2019-08-02 PROCEDURE — C1751: CPT

## 2019-08-02 PROCEDURE — 85045 AUTOMATED RETICULOCYTE COUNT: CPT

## 2019-08-02 PROCEDURE — 86850 RBC ANTIBODY SCREEN: CPT

## 2019-08-02 PROCEDURE — 87186 SC STD MICRODIL/AGAR DIL: CPT

## 2019-08-02 RX ADMIN — Medication 10 MILLIGRAM(S): at 06:16

## 2019-08-02 RX ADMIN — Medication 3 MILLILITER(S): at 06:17

## 2019-08-02 RX ADMIN — PANTOPRAZOLE SODIUM 40 MILLIGRAM(S): 20 TABLET, DELAYED RELEASE ORAL at 06:16

## 2019-08-02 RX ADMIN — SPIRONOLACTONE 25 MILLIGRAM(S): 25 TABLET, FILM COATED ORAL at 06:17

## 2019-08-02 RX ADMIN — Medication 0.5 MILLIGRAM(S): at 06:17

## 2019-08-02 RX ADMIN — Medication 3 MILLILITER(S): at 00:00

## 2019-08-02 RX ADMIN — Medication 500000 UNIT(S): at 06:16

## 2019-08-02 RX ADMIN — CHLORHEXIDINE GLUCONATE 1 APPLICATION(S): 213 SOLUTION TOPICAL at 09:23

## 2019-08-02 RX ADMIN — ERYTHROPOIETIN 20000 UNIT(S): 10000 INJECTION, SOLUTION INTRAVENOUS; SUBCUTANEOUS at 11:15

## 2019-08-02 RX ADMIN — APIXABAN 5 MILLIGRAM(S): 2.5 TABLET, FILM COATED ORAL at 06:16

## 2019-08-02 RX ADMIN — Medication 325 MILLIGRAM(S): at 11:18

## 2019-08-02 RX ADMIN — Medication 650 MILLIGRAM(S): at 09:45

## 2019-08-02 RX ADMIN — Medication 40 MILLIGRAM(S): at 06:16

## 2019-08-02 RX ADMIN — Medication 2: at 12:13

## 2019-08-02 RX ADMIN — Medication 650 MILLIGRAM(S): at 09:22

## 2019-08-02 RX ADMIN — MONTELUKAST 10 MILLIGRAM(S): 4 TABLET, CHEWABLE ORAL at 11:20

## 2019-08-02 NOTE — DISCHARGE NOTE NURSING/CASE MANAGEMENT/SOCIAL WORK - NSDCPNDISPN_GEN_ALL_CORE
Side effects of pain management treatment/Opioids not applicable/not prescribed/Activities of daily living, including home environment that might     exacerbate pain or reduce effectiveness of the pain management plan of care as well as strategies to address these issues/Safe use, storage and disposal of opioids when prescribed/Education provided on the pain management plan of care

## 2019-08-02 NOTE — DISCHARGE NOTE NURSING/CASE MANAGEMENT/SOCIAL WORK - NSDCDPATPORTLINK_GEN_ALL_CORE
You can access the [facility] Patient Portal, offered by [facility], by registering with the following website: [email or web address]

## 2019-08-02 NOTE — DISCHARGE NOTE PROVIDER - HOSPITAL COURSE
On 6/30, Ms. Dolan, a 74 F [employer] with PMHx of HFrEF s/p AICD, asthma, HTN, HLD, atrial fibrillation on eliquis p/w severe, sudden onset L sided abd pain that started this AM. Patient had just finished her meal when she developed left sided abdominal pain. She reported nausea but no vomiting. She denied any fevers, chills, diarrhea or hematochezia. Patient denied having abdominal pain like this in the past.     Upon arrival to ED, patient with stable vitals. She received 4mg of IV morphine for pain. She subsequently went for CT of abdomen/pelvis with IV contrast to evaluate for cause of abdominal pain. After CT, she received additional morphine for pain. Shortly after that, patient started to have palpitations and shortess of breath. She started to have wheezing and became hypoxic. Bedside US by ED was limited 2/2 to habitus, but it revealed a few B lines. She received 80mg total of IV lasix, 125 of solumedrol, duonebs x2 and narcan.     MICU consulted for hypoxic respiratory failure. Upon evaluation, patient's VS , RR 30, O2 sat 100% on 100% FiO2 and /72. Patient's FiO2 decreased to 40% and patient with O2 sat of 100%. Patient admitted to MICU for management of respiratory and heart failure.         7/1:  CT showed diverticulitis in colon. During the day, abd pain appeared to worsen- need to r/o perforation, ordered CT abd (w/o IV contrast), GI consulted             7/3 Abdominal washout performed with Dr. Alvarado: Bowel pink and viable. No areas of necrosis. No gross pus in abdomen. Ostomy pink and viable      Lactate down trend.    7/4: VTach. 20mg cardizem given. SICU not going to extubate 2/2 OR tomorrow. OK to restart Lovenox    7/5: S/p ANGIE,     7/8 - diuresing well, extubated, scheduled for final washout and closure tomorrow,    7/9 Washout w/o closure w/ purulent material in abd cavity, cultures sent out.  Return from OR intubated.  SBT this evening. Increase TPN insulin from hyperglycemia    7/10 Extubated to bipap, continued diuresis, tachy to 120s EKG shows paced rhythm, insulin drip for persistant hyperglycemia, planned for washout and closure w/mesh tomorrow    7/11 OR for abdominal washout and closure.  Returned intubated    7/12 post-op: closed abdomen with strattice. left 2 garcia drains (upper abd and pelvis), black vac placed over mesh. Extubate per sicu recs.  Plastics consult for skin graft over mesh once granulated in 2-4 weeks.    7/13 patient extubated and off bipap    7/14 starr dc'ed, transferred to floor    7/16 Seen by heme, will start EPO.  Seen by pulm.  CT to rule out pe showed no pe    7/17: speech and swallow and modified barium swallow, NGT removed    7/18: failed swallow study    7/19: started on clears    7/21: LUQ drain dc'd    7/22: transferred to floor, eliquis resumed    7/23 Concern for purulent draiange from remained gurdeep drain.  Culturing fluid.  Heme- 5 more doses of procrit, every other day.    7/24: doing excellent on PO intake -> TPN being discontinued.  On 20u Lantus home insulin and per nutrition, will likely need a basal dose.  White coutn down to 14.7 from 18.    7/25: Doing well sugars >115 throughout the day.  Continues to tolerate PO.  ID consulted    7/26: wound vac changed, WBC 12    7/27: MBS w/S&S - advanced to dysphagia 2, no further TPN may d/c PICC, WBC 14.6    7/28: WBC 15.4, CT C/A/P showing new LLL pneumonia and RLQ abdominal wall collection 3.3x5.7cm    7/29: GURDEEP removed, rehab planning. ID recommendations per sputum gram stain-few PMN leukocytes, few squamous epithelial cells moderate G(-) rods, rare G(-) diplococci. Heart failure- increasing enalipril to 10mg PO BID; stating that patient is ready for discharge from cardiovascular perspective     7/30: Vanc trough 26.3, ID rec holding morning dose and redraw trough at 9pm, 9 pm trough 18 and evening dose was held as well    7/31: Morning Vanc trough was 16.4 and 1 g Vanc was given and continued every 12 hours, ID recommedning 7 days of ABx now day 3/7 and oral regimen determined for discharge    8/1: Accepted to rehab, ID rec levaquin for 7 days for pseudomonas coverage On 6/30, Ms. Dolan, a 74 F [employer] with PMHx of HFrEF s/p AICD, asthma, HTN, HLD, atrial fibrillation on eliquis p/w severe, sudden onset L sided abd pain that started this AM. Patient had just finished her meal when she developed left sided abdominal pain. She reported nausea but no vomiting. She denied any fevers, chills, diarrhea or hematochezia. Patient denied having abdominal pain like this in the past.     Upon arrival to ED, patient with stable vitals. She received 4mg of IV morphine for pain. She subsequently went for CT of abdomen/pelvis with IV contrast to evaluate for cause of abdominal pain. After CT, she received additional morphine for pain. Shortly after that, patient started to have palpitations and shortess of breath. She started to have wheezing and became hypoxic. Bedside US by ED was limited 2/2 to habitus, but it revealed a few B lines. She received 80mg total of IV lasix, 125 of solumedrol, duonebs x2 and narcan.     MICU consulted for hypoxic respiratory failure. Upon evaluation, patient's VS , RR 30, O2 sat 100% on 100% FiO2 and /72. Patient's FiO2 decreased to 40% and patient with O2 sat of 100%. Patient admitted to MICU for management of respiratory and heart failure.         7/1:  CT showed diverticulitis in colon. During the day, abd pain appeared to worsen- need to r/o perforation, ordered CT abd (w/o IV contrast), GI consulted         7/3 Abdominal washout performed with Dr. Alvarado: Bowel pink and viable. No areas of necrosis. No gross pus in abdomen. Ostomy pink and viable      Lactate down trend.        7/4: VTach. 20mg cardizem given. SICU not going to extubate 2/2 OR tomorrow. OK to restart Lovenox        7/5: Third lood laparotomy: purulent ascites present copiously irrigated and suctioned all bowel viable colonic stump intact unable to close abdomen without tension        7/8 - diuresing well, extubated, scheduled for final washout and closure tomorrow        7/9- Abdominal washout: Edematous bowel, all viable. Minimal pus-like fluid in pelvis. Sent for culture. Anterior flaps raised but still unable to close abdominal wall defect primarily. Abthera vac replaced        7/11- Open repair of incisional hernia using biological mesh: Unable to close abdomen due to bowel edema with loss of domain and fascial retraction. Bowel densely adhesed to self at this point, no purulence appreciated during washout. Placed [id number] Strattice to close abdomen, 2 19F Wolfgang drains left, one in upper abd and other in pelvis. Black wound vac placed over biologic mesh. NG re-positioned into mid-body of stomach        7/13 patient extubated and off bipap        7/16 Seen by heme, will start EPO.  Seen by pulm.  CT to rule out pe showed no pe        7/17: speech and swallow and modified barium swallow, NGT removed        7/18: failed swallow study        7/19: started on clears        7/21: LUQ drain dc'd        7/22: transferred to floor, eliquis resumed        7/24: doing excellent on PO intake -> TPN being discontinued.      7/25: ID consulted        7/27: MBS w/S&S - advanced to dysphagia 2, no further TPN may d/c PICC, WBC 14.6        7/28: WBC 15.4, CT C/A/P showing new LLL pneumonia and RLQ abdominal wall collection 3.3x5.7cm        7/29: BASIM removed, rehab planning. ID recommendations per sputum gram stain-few PMN leukocytes, few squamous epithelial cells moderate G(-) rods, rare G(-) diplococci. Heart failure- increasing enalipril to 10mg PO BID; stating that patient is ready for discharge from cardiovascular perspective         7/30: Vanc trough 26.3, ID rec holding morning dose and redraw trough at 9pm, 9 pm trough 18 and evening dose was held as well        7/31: Morning Vanc trough was 16.4 and 1 g Vanc was given and continued every 12 hours, ID recommedning 7 days of ABx now day 3/7 and oral regimen determined for discharge        8/1: Accepted to rehab, ID rec levaquin for 7 days for pseudomonas coverage            At the time of discharge, the patient was hemodynamically stable, was tolerating dysphagia 2 diet, was voiding urine and passing stool, was ambulating, and was comfortable with adequate pain control. On 6/30, Ms. Dolan, a 74 F [employer] with PMHx of HFrEF s/p AICD, asthma, HTN, HLD, atrial fibrillation on eliquis p/w severe, sudden onset L sided abd pain that started this AM. Patient had just finished her meal when she developed left sided abdominal pain. She reported nausea but no vomiting. She denied any fevers, chills, diarrhea or hematochezia. Patient denied having abdominal pain like this in the past.     Upon arrival to ED, patient with stable vitals. She received 4mg of IV morphine for pain. She subsequently went for CT of abdomen/pelvis with IV contrast to evaluate for cause of abdominal pain. After CT, she received additional morphine for pain. Shortly after that, patient started to have palpitations and shortess of breath. She started to have wheezing and became hypoxic. Bedside US by ED was limited 2/2 to habitus, but it revealed a few B lines. She received 80mg total of IV lasix, 125 of solumedrol, duonebs x2 and narcan.     MICU consulted for hypoxic respiratory failure. Upon evaluation, patient's VS , RR 30, O2 sat 100% on 100% FiO2 and /72. Patient's FiO2 decreased to 40% and patient with O2 sat of 100%. Patient admitted to MICU for management of respiratory and heart failure.         7/1:  CT showed diverticulitis in colon. During the day, abd pain appeared to worsen- need to r/o perforation, ordered CT abd (w/o IV contrast), GI consulted         7/3 Abdominal washout performed with Dr. Alvarado: Bowel pink and viable. No areas of necrosis. No gross pus in abdomen. Ostomy pink and viable      Lactate down trend.        7/4: VTach. 20mg cardizem given. SICU not going to extubate 2/2 OR tomorrow. OK to restart Lovenox        7/5: Third lood laparotomy: purulent ascites present copiously irrigated and suctioned all bowel viable colonic stump intact unable to close abdomen without tension        7/8 - diuresing well, extubated, scheduled for final washout and closure tomorrow        7/9- Abdominal washout: Edematous bowel, all viable. Minimal pus-like fluid in pelvis. Sent for culture. Anterior flaps raised but still unable to close abdominal wall defect primarily. Abthera vac replaced        7/11- Open repair of incisional hernia using biological mesh: Unable to close abdomen due to bowel edema with loss of domain and fascial retraction. Bowel densely adhesed to self at this point, no purulence appreciated during washout. Placed [id number] Strattice to close abdomen, 2 19F Wolfgang drains left, one in upper abd and other in pelvis. Black wound vac placed over biologic mesh. NG re-positioned into mid-body of stomach        7/13 patient extubated and off bipap        7/16 Seen by heme, will start EPO.  Seen by pulm.  CT to rule out pe showed no pe        7/17: speech and swallow and modified barium swallow, NGT removed        7/18: failed swallow study        7/19: started on clears        7/21: LUQ drain dc'd        7/22: transferred to floor, eliquis resumed        7/24: doing excellent on PO intake -> TPN being discontinued.      7/25: ID consulted        7/27: MBS w/S&S - advanced to dysphagia 2, no further TPN may d/c PICC, WBC 14.6        7/28: WBC 15.4, CT C/A/P showing new LLL pneumonia and RLQ abdominal wall collection 3.3x5.7cm        7/29: BASIM removed, rehab planning. ID recommendations per sputum gram stain-few PMN leukocytes, few squamous epithelial cells moderate G(-) rods, rare G(-) diplococci. Heart failure- increasing enalipril to 10mg PO BID; stating that patient is ready for discharge from cardiovascular perspective         7/30: Vanc trough 26.3, ID rec holding morning dose and redraw trough at 9pm, 9 pm trough 18 and evening dose was held as well        7/31: Morning Vanc trough was 16.4 and 1 g Vanc was given and continued every 12 hours, ID recommedning 7 days of ABx now day 3/7 and oral regimen determined for discharge        8/1: Accepted to rehab, ID rec levaquin for 7 days for pseudomonas coverage            At the time of discharge, the patient was hemodynamically stable, was tolerating dysphagia 2 diet, was up and in chair, and was comfortable with adequate pain control.

## 2019-08-02 NOTE — DISCHARGE NOTE PROVIDER - CARE PROVIDER_API CALL
Clark Alvarado)  Surgery  [address], [address]  [place], [place] [id number]  Phone: [phone]  Fax: [phone]  Follow Up Time:     Rex Goode)  Internal Medicine  [address] [place] [address]  Phone: [phone]  Fax: [phone]  Follow Up Time:

## 2019-08-02 NOTE — PROGRESS NOTE ADULT - SUBJECTIVE AND OBJECTIVE BOX
[facility] NUTRITION SUPPORT / TPN -- FOLLOW UP NOTE  --------------------------------------------------------------------------------    24 hour events/subjective:  Pt seen at 9:30am prior to d/c to [facility]  - Pt more awake/ alert- doesn't like the taste of food- reminded her importance of eating  - Tolerating dysphagia diet, eating more- daughter bringing in food favorites to help encourage  - No acute events overnight  - more alert later in the day, no n/v/d  - no f/c/s  - no cp/ palp/ sob  - Encouraged eating and oob to chair       Encourage ambulation and increased mobility/ pt being offloaded  - PT to follow- pt for [facility] d/c      Diet:  Diet, Dysphagia 2 Mechanical Soft-Nectar Consistency Fluid (07-27-19 @ 14:43)      Appetite: [  ]Poor [x  ]Adequate [  ]Good  Caloric intake:  [   ]  Adequate   [ x  ] Inadequate    ROS: General/ GI see HPI  all other systems negative      ALLERGIES & MEDICATIONS  --------------------------------------------------------------------------------  ALLERGIES  Coreg (Other)  digoxin (Other; Short breath (Mild to Mod))  penicillins (Hives)    Intolerances  metoprolol (Other)  Solu-Medrol (Other (Mild to Mod))      STANDING INPATIENT MEDICATIONS    ALBUTerol/ipratropium for Nebulization 3 milliLiter(s) Nebulizer every 6 hours  apixaban 5 milliGRAM(s) Oral every 12 hours  buDESOnide    Inhalation Suspension 0.5 milliGRAM(s) Inhalation every 12 hours  chlorhexidine 2% Cloths 1 Application(s) Topical daily  dextrose 50% Injectable 12.5 Gram(s) IV Push once  dextrose 50% Injectable 25 Gram(s) IV Push once  dextrose 50% Injectable 25 Gram(s) IV Push once  enalapril 10 milliGRAM(s) Oral every 12 hours  epoetin dejuan Injectable 43484 Unit(s) SubCutaneous <User Schedule>  ferrous    sulfate 325 milliGRAM(s) Oral daily  furosemide    Tablet 40 milliGRAM(s) Oral every 24 hours  insulin glargine Injectable (LANTUS) 20 Unit(s) SubCutaneous at bedtime  insulin lispro (HumaLOG) corrective regimen sliding scale   SubCutaneous three times a day before meals  insulin lispro (HumaLOG) corrective regimen sliding scale   SubCutaneous at bedtime  levoFLOXacin  Tablet 750 milliGRAM(s) Oral every 24 hours  montelukast 10 milliGRAM(s) Oral every 24 hours  nystatin    Suspension 774083 Unit(s) Oral two times a day  pantoprazole    Tablet 40 milliGRAM(s) Oral two times a day  simvastatin 20 milliGRAM(s) Oral at bedtime  spironolactone 25 milliGRAM(s) Oral daily      PRN INPATIENT MEDICATION  acetaminophen   Tablet .. 650 milliGRAM(s) Oral every 6 hours PRN  dextrose 40% Gel 15 Gram(s) Oral once PRN  glucagon  Injectable 1 milliGRAM(s) IntraMuscular once PRN  sodium chloride 0.65% Nasal 1 Spray(s) Both Nostrils daily PRN        VITALS/PHYSICAL EXAM  --------------------------------------------------------------------------------  T(C): 36.6 (08-02-19 @ 11:24), Max: 37.6 (08-01-19 @ 21:50)  HR: 105 (08-02-19 @ 11:24) (104 - 106)  BP: 96/63 (08-02-19 @ 11:24) (96/63 - 132/80)  RR: 17 (08-02-19 @ 11:24) (16 - 17)  SpO2: 98% (08-02-19 @ 11:24) (97% - 98%)  Wt(kg): --        08-01-19 @ 07:01  -  08-02-19 @ 07:00  --------------------------------------------------------  IN: 720 mL / OUT: 1250 mL / NET: -530 mL    08-02-19 @ 07:01  -  08-02-19 @ 14:56  --------------------------------------------------------  IN: 480 mL / OUT: 800 mL / NET: -320 mL    Physical Exam:    	Gen: NAD, WD/ WN/ A&Ox3  	HEENT: NC/AT, PERRL, mucosa moist, supple neck, clear oropharynx  	Chest: clear  	GI: +softly distended, nontender, no BS                  (+)Ostomy pink & viable. liquid & soft formed stool                  VAC intact w/ good seal, drain w/ serous drainage              :  (+)Ching              MSK/Vascular: Passive ROM x4,  no clubbing, cyanosis, nor edema                   RUE PICC dressing C/D/I w/o SOI w/ no edema  	Neuro: Awake and alert  	Skin: Warm, good turgor, without rashes        LABS/ CULTURES/ RADIOLOGY:              10.4   14.3  >-----------<  422      [08-02-19 @ 10:32]              34.8     131  |  94  |  19  ----------------------------<  133      [08-02-19 @ 10:32]  4.0   |  23  |  0.88        Ca     9.2     [08-02-19 @ 10:32]      Mg     1.6     [08-02-19 @ 10:32]      Phos  2.2     [08-02-19 @ 10:32]        CAPILLARY BLOOD GLUCOSE  POCT Blood Glucose.: 153 mg/dL (02 Aug 2019 11:54)  POCT Blood Glucose.: 125 mg/dL (02 Aug 2019 08:08)  POCT Blood Glucose.: 117 mg/dL (01 Aug 2019 21:45)  POCT Blood Glucose.: 119 mg/dL (01 Aug 2019 16:43)    Prealbumin, Serum: 12 mg/dL (07-24-19 @ 14:57)  Prealbumin, Serum: 5 mg/dL (07-04-19 @ 22:45)

## 2019-08-02 NOTE — PROGRESS NOTE ADULT - REASON FOR ADMISSION
Diverticulitis
Perforated Diverticulitis / Feculent Peritonitis
Perforated Diverticulitis
Diverticulitis
Perforated Diverticulitis
Perforated viscous
Respiratory distress
Respiratory distress
diverticulitis
perforated diverticulitis
Perforated Diverticulitis / Feculent Peritonitis
Perforated Diverticulitis, Leukocytosis
Perforated Diverticulitis, Leukocytosis
Perforated diverticulitis
SOB, abdom pain

## 2019-08-02 NOTE — PROGRESS NOTE ADULT - SUBJECTIVE AND OBJECTIVE BOX
Green Team Surgery Progress Note     SUBJECTIVE / 24H EVENTS  Patient seen and examined on morning rounds. No acute interval events. She wants to get out of the hospital and go to rehab.    OBJECTIVE:    VITAL SIGNS:  T(C): 37.2 (08-02-19 @ 01:28), Max: 37.6 (08-01-19 @ 21:50)  HR: 106 (08-02-19 @ 01:28) (100 - 109)  BP: 132/80 (08-02-19 @ 01:28) (100/64 - 132/80)  RR: 17 (08-02-19 @ 01:28) (16 - 17)  SpO2: 97% (08-02-19 @ 01:28) (97% - 99%)      POCT Blood Glucose.: 117 mg/dL (08-01-19 @ 21:45)  POCT Blood Glucose.: 119 mg/dL (08-01-19 @ 16:43)  POCT Blood Glucose.: 116 mg/dL (08-01-19 @ 12:58)      PHYSICAL EXAM:  General: alert and oriented, NAD  Resp: airway patent, respirations unlabored  CVS: regular rate and rhythm  Abdomen: soft,non tender, non distended. Wound vac in place and functioning, ostomy viable and functioning   Extremities: WWP  Skin: warm, dry, appropriate color      07-31-19 @ 07:01  -  08-01-19 @ 07:00  --------------------------------------------------------  IN:    Oral Fluid: 780 mL  Total IN: 780 mL    OUT:    Colostomy: 200 mL    Voided: 1450 mL  Total OUT: 1650 mL    Total NET: -870 mL      08-01-19 @ 07:01  -  08-02-19 @ 04:32  --------------------------------------------------------  IN:    Oral Fluid: 720 mL  Total IN: 720 mL    OUT:    Colostomy: 150 mL    Voided: 950 mL  Total OUT: 1100 mL    Total NET: -380 mL          LAB VALUES:  08-01    134<L>  |  97  |  18  ----------------------------<  88  4.0   |  25  |  0.70    Ca    8.5      01 Aug 2019 09:02  Phos  2.1     08-01  Mg     1.7     08-01                                 9.4    13.5  )-----------( 396      ( 01 Aug 2019 09:02 )             30.4                   MICROBIOLOGY:    No new microbiology data for review.     RADIOLOGY:    No new radiographic images for review.    MEDICATIONS  (STANDING):  ALBUTerol/ipratropium for Nebulization 3 milliLiter(s) Nebulizer every 6 hours  apixaban 5 milliGRAM(s) Oral every 12 hours  buDESOnide    Inhalation Suspension 0.5 milliGRAM(s) Inhalation every 12 hours  chlorhexidine 2% Cloths 1 Application(s) Topical daily  dextrose 50% Injectable 12.5 Gram(s) IV Push once  dextrose 50% Injectable 25 Gram(s) IV Push once  dextrose 50% Injectable 25 Gram(s) IV Push once  enalapril 10 milliGRAM(s) Oral every 12 hours  epoetin dejuan Injectable 81951 Unit(s) SubCutaneous <User Schedule>  ferrous    sulfate 325 milliGRAM(s) Oral daily  furosemide    Tablet 40 milliGRAM(s) Oral every 24 hours  insulin glargine Injectable (LANTUS) 20 Unit(s) SubCutaneous at bedtime  insulin lispro (HumaLOG) corrective regimen sliding scale   SubCutaneous three times a day before meals  insulin lispro (HumaLOG) corrective regimen sliding scale   SubCutaneous at bedtime  levoFLOXacin  Tablet 750 milliGRAM(s) Oral every 24 hours  montelukast 10 milliGRAM(s) Oral every 24 hours  nystatin    Suspension [id number] Unit(s) Oral two times a day  pantoprazole    Tablet 40 milliGRAM(s) Oral two times a day  simvastatin 20 milliGRAM(s) Oral at bedtime  spironolactone 25 milliGRAM(s) Oral daily    MEDICATIONS  (PRN):  acetaminophen   Tablet .. 650 milliGRAM(s) Oral every 6 hours PRN Mild Pain (1 - 3)  dextrose 40% Gel 15 Gram(s) Oral once PRN Blood Glucose LESS THAN 70 milliGRAM(s)/deciliter  glucagon  Injectable 1 milliGRAM(s) IntraMuscular once PRN Glucose LESS THAN 70 milligrams/deciliter  sodium chloride 0.65% Nasal 1 Spray(s) Both Nostrils daily PRN Nasal Congestion

## 2019-08-02 NOTE — PROGRESS NOTE ADULT - ASSESSMENT
A/P: 73 yo Female with PMH of asthma, A fib, HFrEF with perforated diverticulitis s/p L hemicolectomy and end colostomy with VAC placement along with multiple washouts and closure with bridging mesh.  Doing well, tolerating dysphagia diet.  She is hemodynamically stable and afebrile.     - Per Cards- OK to discharge on enalipril to 10mg PO BID. Please see Heart failure fellow note for detailed recs  - Continue discharge planning with case management to rehab, [facility]'t accept but other rehab accepting  - F/u sputum culture, growing pseudo  - F/u w/ ID recommending Levaquin PO for 7 days  - Heme recommending continued procrit to Hgb > 10  - Dispo: Rehab     Green Surgery, [phone]

## 2019-08-02 NOTE — DISCHARGE NOTE PROVIDER - NSDCCPCAREPLAN_GEN_ALL_CORE_FT
PRINCIPAL DISCHARGE DIAGNOSIS  Diagnosis: Diverticulitis of large intestine without perforation or abscess without bleeding  Assessment and Plan of Treatment:

## 2019-08-02 NOTE — PROGRESS NOTE ADULT - ASSESSMENT
A/P:74F admitted with abdominal pain and exam findings consistent with peritonitis secondary to acute diverticulitis at the splenic flexure with evidence of contained perforation now s/p Exploratory laparotomy, left hemicolectomy, end colostomy, and ABThera Vac placement and s/p from RTOR for abdominal washout and Abthera vac placement, off pressors. TPN started 7/5 for Protein-Calorie Malnutrition    - TPN goal when at goal: Carbohydrates: 210grams, Amino Acid: 120grams, 40g lipids decreased from 2000 to 1200 ml      TPN stopped as pt tolerating / eating more - Dysphagia I DM      Calorie COunt Noted      ENCOURAGE PO- d/w daughter      RD to speak to pt & daughter   - If ongoing nutritional need an ongoing issue- consider KO/ PEG for TFs as pt has a functional GI tract  - Elevated WBC  improving- Continuing to monitor BASIM output      ID COnsult noted  - Hyperglycemia- Continue FS w/basal & sliding scale coverage. f/u w/ PMD/ ENDO  - HypoPhos- VitD Deficiency- f/u w/ PMD/ ENdo  - Anemia- pt is a [employer]- continue mngt per primary team, Hematology note appreciated       Improving with supplementation  - D/w Daughter importance of f/u w/ PMD/ ENDO  - Continue as per Surgery d/w team, will follow with you    TPN team, pager [phone], spectra [phone]  D/w Adela Huggins & HERNAN Cross

## 2019-08-02 NOTE — DISCHARGE NOTE PROVIDER - NSDCFUSCHEDAPPT_GEN_ALL_CORE_FT
ANDRE BARKER ; 08/09/2019 ; [facility] [address] ANDRE Hogan ; 10/16/2019 ; [facility] [address]  ANDRE BARKER ; 08/09/2019 ; [facility] [address] ANDRE Hogan ; 10/16/2019 ; [facility] [address]  ANDRE BARKER ; 08/09/2019 ; [facility] [address] ANDRE Hogan ; 10/16/2019 ; [facility] [address]  ANDRE BARKER ; 08/09/2019 ; [facility] [address] ANDRE Hogan ; 10/16/2019 ; [facility] [address]  ANDRE BARKER ; 08/09/2019 ; [facility] [address] ANDRE Hogan ; 10/16/2019 ; [facility] [address]

## 2019-08-02 NOTE — DISCHARGE NOTE PROVIDER - NSDCCPTREATMENT_GEN_ALL_CORE_FT
PRINCIPAL PROCEDURE  Procedure: Open repair of incisional hernia using biological mesh  Findings and Treatment:

## 2019-08-02 NOTE — DISCHARGE NOTE PROVIDER - NSDCFUADDINST_GEN_ALL_CORE_FT
PAIN CONTROL: You may take 650 mg of Tylenol every 4-6 hours. Do not exceed 4 grams of Tylenol daily.     NOTIFY YOUR SURGEON IF: You have any bleeding that does not stop, any pus draining from your wound, any fever (over 100.4 F) or chills, persistent nausea/vomiting, persistent diarrhea, or if your pain is not controlled on your discharge pain medications.    FOLLOW-UP:  1. Please call [phone] to make a follow-up appointment within one week with Dr. Alvarado of discharge.    2) Please call [phone] to make a follow-up appointment within one week with Dr. Goode of discharge    3. Please follow up with your primary care physician in one week regarding your hospitalization.

## 2019-08-09 ENCOUNTER — APPOINTMENT (OUTPATIENT)
Dept: CARDIOLOGY | Facility: CLINIC | Age: 74
End: 2019-08-09

## 2019-08-13 PROBLEM — Z78.9 OTHER SPECIFIED HEALTH STATUS: Chronic | Status: ACTIVE | Noted: 2019-07-07

## 2019-08-13 PROBLEM — Z53.1 PROCEDURE AND TREATMENT NOT CARRIED OUT BECAUSE OF PATIENT'S DECISION FOR REASONS OF BELIEF AND GROUP PRESSURE: Chronic | Status: ACTIVE | Noted: 2019-07-07

## 2019-08-14 ENCOUNTER — INPATIENT (INPATIENT)
Facility: HOSPITAL | Age: 74
LOS: 12 days | Discharge: NURSING, REHAB OR LONG-TERM CARE | DRG: 393 | End: 2019-08-27
Attending: SURGERY | Admitting: SURGERY
Payer: MEDICARE

## 2019-08-14 VITALS
HEART RATE: 125 BPM | DIASTOLIC BLOOD PRESSURE: 68 MMHG | RESPIRATION RATE: 18 BRPM | SYSTOLIC BLOOD PRESSURE: 112 MMHG | TEMPERATURE: 98 F | WEIGHT: 188.05 LBS | OXYGEN SATURATION: 96 %

## 2019-08-14 LAB
ALBUMIN SERPL ELPH-MCNC: 2.5 G/DL — LOW (ref 3.3–5)
ALP SERPL-CCNC: 131 U/L — HIGH (ref 40–120)
ALT FLD-CCNC: 10 U/L — SIGNIFICANT CHANGE UP (ref 10–45)
ANION GAP SERPL CALC-SCNC: 15 MMOL/L — SIGNIFICANT CHANGE UP (ref 5–17)
APTT BLD: 37.9 SEC — HIGH (ref 27.5–36.3)
AST SERPL-CCNC: 16 U/L — SIGNIFICANT CHANGE UP (ref 10–40)
BASE EXCESS BLDV CALC-SCNC: 1.6 MMOL/L — SIGNIFICANT CHANGE UP (ref -2–2)
BASOPHILS # BLD AUTO: 0 K/UL — SIGNIFICANT CHANGE UP (ref 0–0.2)
BILIRUB SERPL-MCNC: 0.6 MG/DL — SIGNIFICANT CHANGE UP (ref 0.2–1.2)
BUN SERPL-MCNC: 31 MG/DL — HIGH (ref 7–23)
CA-I SERPL-SCNC: 1.15 MMOL/L — SIGNIFICANT CHANGE UP (ref 1.12–1.3)
CALCIUM SERPL-MCNC: 8.7 MG/DL — SIGNIFICANT CHANGE UP (ref 8.4–10.5)
CHLORIDE BLDV-SCNC: 104 MMOL/L — SIGNIFICANT CHANGE UP (ref 96–108)
CHLORIDE SERPL-SCNC: 101 MMOL/L — SIGNIFICANT CHANGE UP (ref 96–108)
CO2 BLDV-SCNC: 27 MMOL/L — SIGNIFICANT CHANGE UP (ref 22–30)
CO2 SERPL-SCNC: 22 MMOL/L — SIGNIFICANT CHANGE UP (ref 22–31)
CREAT SERPL-MCNC: 0.94 MG/DL — SIGNIFICANT CHANGE UP (ref 0.5–1.3)
EOSINOPHIL # BLD AUTO: 0 K/UL — SIGNIFICANT CHANGE UP (ref 0–0.5)
GAS PNL BLDV: 139 MMOL/L — SIGNIFICANT CHANGE UP (ref 135–145)
GAS PNL BLDV: SIGNIFICANT CHANGE UP
GAS PNL BLDV: SIGNIFICANT CHANGE UP
GLUCOSE BLDV-MCNC: 110 MG/DL — HIGH (ref 70–99)
GLUCOSE SERPL-MCNC: 112 MG/DL — HIGH (ref 70–99)
HCO3 BLDV-SCNC: 26 MMOL/L — SIGNIFICANT CHANGE UP (ref 21–29)
HCT VFR BLD CALC: 37.3 % — SIGNIFICANT CHANGE UP (ref 34.5–45)
HCT VFR BLDA CALC: 33 % — LOW (ref 39–50)
HGB BLD CALC-MCNC: 10.6 G/DL — LOW (ref 11.5–15.5)
HGB BLD-MCNC: 10.5 G/DL — LOW (ref 11.5–15.5)
INR BLD: 3.8 RATIO — HIGH (ref 0.88–1.16)
LACTATE BLDV-MCNC: 2.5 MMOL/L — HIGH (ref 0.7–2)
LYMPHOCYTES # BLD AUTO: 1.8 K/UL — SIGNIFICANT CHANGE UP (ref 1–3.3)
LYMPHOCYTES # BLD AUTO: 10 % — LOW (ref 13–44)
MCHC RBC-ENTMCNC: 28 GM/DL — LOW (ref 32–36)
MCHC RBC-ENTMCNC: 29.1 PG — SIGNIFICANT CHANGE UP (ref 27–34)
MCV RBC AUTO: 104 FL — HIGH (ref 80–100)
MONOCYTES # BLD AUTO: 2.5 K/UL — HIGH (ref 0–0.9)
MONOCYTES NFR BLD AUTO: 13 % — SIGNIFICANT CHANGE UP (ref 2–14)
NEUTROPHILS # BLD AUTO: 15.5 K/UL — HIGH (ref 1.8–7.4)
NEUTROPHILS NFR BLD AUTO: 72 % — SIGNIFICANT CHANGE UP (ref 43–77)
NEUTS BAND # BLD: 5 % — SIGNIFICANT CHANGE UP (ref 0–8)
PCO2 BLDV: 42 MMHG — SIGNIFICANT CHANGE UP (ref 35–50)
PH BLDV: 7.4 — SIGNIFICANT CHANGE UP (ref 7.35–7.45)
PLAT MORPH BLD: NORMAL — SIGNIFICANT CHANGE UP
PLATELET # BLD AUTO: 261 K/UL — SIGNIFICANT CHANGE UP (ref 150–400)
PO2 BLDV: 31 MMHG — SIGNIFICANT CHANGE UP (ref 25–45)
POTASSIUM BLDV-SCNC: 4.6 MMOL/L — SIGNIFICANT CHANGE UP (ref 3.5–5.3)
POTASSIUM SERPL-MCNC: 4.8 MMOL/L — SIGNIFICANT CHANGE UP (ref 3.5–5.3)
POTASSIUM SERPL-SCNC: 4.8 MMOL/L — SIGNIFICANT CHANGE UP (ref 3.5–5.3)
PROT SERPL-MCNC: 7.4 G/DL — SIGNIFICANT CHANGE UP (ref 6–8.3)
PROTHROM AB SERPL-ACNC: 45.6 SEC — HIGH (ref 10–12.9)
RBC # BLD: 3.6 M/UL — LOW (ref 3.8–5.2)
RBC # FLD: 18.6 % — HIGH (ref 10.3–14.5)
RBC BLD AUTO: SIGNIFICANT CHANGE UP
SAO2 % BLDV: 49 % — LOW (ref 67–88)
SODIUM SERPL-SCNC: 138 MMOL/L — SIGNIFICANT CHANGE UP (ref 135–145)
WBC # BLD: 19.8 K/UL — HIGH (ref 3.8–10.5)
WBC # FLD AUTO: 19.8 K/UL — HIGH (ref 3.8–10.5)

## 2019-08-14 PROCEDURE — 99285 EMERGENCY DEPT VISIT HI MDM: CPT

## 2019-08-14 PROCEDURE — 71045 X-RAY EXAM CHEST 1 VIEW: CPT | Mod: 26

## 2019-08-14 PROCEDURE — 93010 ELECTROCARDIOGRAM REPORT: CPT

## 2019-08-14 PROCEDURE — 76937 US GUIDE VASCULAR ACCESS: CPT

## 2019-08-14 PROCEDURE — 74177 CT ABD & PELVIS W/CONTRAST: CPT | Mod: 26

## 2019-08-14 PROCEDURE — 36000 PLACE NEEDLE IN VEIN: CPT

## 2019-08-14 RX ORDER — DIPHENHYDRAMINE HCL 50 MG
50 CAPSULE ORAL ONCE
Refills: 0 | Status: COMPLETED | OUTPATIENT
Start: 2019-08-14 | End: 2020-07-12

## 2019-08-14 RX ORDER — SODIUM CHLORIDE 9 MG/ML
500 INJECTION INTRAMUSCULAR; INTRAVENOUS; SUBCUTANEOUS ONCE
Refills: 0 | Status: COMPLETED | OUTPATIENT
Start: 2019-08-14 | End: 2019-08-14

## 2019-08-14 RX ORDER — HYDROCORTISONE 20 MG
100 TABLET ORAL ONCE
Refills: 0 | Status: COMPLETED | OUTPATIENT
Start: 2019-08-14 | End: 2019-08-14

## 2019-08-14 RX ORDER — DIPHENHYDRAMINE HCL 50 MG
50 CAPSULE ORAL ONCE
Refills: 0 | Status: COMPLETED | OUTPATIENT
Start: 2019-08-14 | End: 2019-08-14

## 2019-08-14 RX ADMIN — Medication 100 MILLIGRAM(S): at 15:28

## 2019-08-14 RX ADMIN — SODIUM CHLORIDE 500 MILLILITER(S): 9 INJECTION INTRAMUSCULAR; INTRAVENOUS; SUBCUTANEOUS at 15:30

## 2019-08-14 RX ADMIN — Medication 50 MILLIGRAM(S): at 18:35

## 2019-08-14 NOTE — ED PROVIDER NOTE - PHYSICAL EXAMINATION
Gen: AAO x 3, NAD  HEENT: NC/AT, PERRLA, EOMI, MMM  Resp: unlabored CTAB  Cardiac: rrr s1s2, no murmurs, rubs or gallops  GI: wound vac in place with small amount of surround erythema.  Small amount of purulent discharge on dressing.  ND, +BS, Soft, diffusely tender to palpation with guarding  Neuro: no focal deficits

## 2019-08-14 NOTE — CONSULT NOTE ADULT - SUBJECTIVE AND OBJECTIVE BOX
Surgery Consult  Consulting surgical team: Green team surgery  Consulting attending: Katie     HPI:    75 y/o F with Pmhx of HFrEF, s/p AICD, asthma, HTN, HLD, atrial fibrillation on eliquis, with previous perforated diverticulosis, Hartmans procedure with multiple RTOR for abd washouts. Last surgery 7/11/19, closure of incisional hernia with biologic mesh, placement of wound vac. Patient was discharged from hospital August 2nd and sent to rehab. Per the daughter, the patient began to have L sided abd pain and "cellulitis" to L abdomen. She also noted wound vac began to put out brown colored drainage. Reports one fever on Monday of 102.0, no fevers since. She has not been eating due to loss of appetite       PAST MEDICAL HISTORY:  Refusal of blood transfusions as patient is Oriental orthodox  Patient is Oriental orthodox  Vertigo  Atrial flutter  Diverticulitis  Cardiomyopathy  Kidney stone  COPD (chronic obstructive pulmonary disease)  Cardiac Pacemaker  HTN - Hypertension  Gout  Diabetes  Congestive Heart Failure  Asthma      PAST SURGICAL HISTORY:  S/P cholecystectomy  AICD (Automatic Cardioverter/Defibrillator) Present  S/P Cholecystectomy      MEDICATIONS:  hydrocortisone sodium succinate Injectable 100 milliGRAM(s) IV Push Once  sodium chloride 0.9% Bolus 500 milliLiter(s) IV Bolus once      ALLERGIES:  Coreg (Other)  digoxin (Other; Short breath (Mild to Mod))  IV Contrast (Unknown)  metoprolol (Other)  penicillins (Hives)  Solu-Medrol (Other (Mild to Mod))      VITALS & I/Os:  Vital Signs Last 24 Hrs  T(C): 37.1 (14 Aug 2019 14:22), Max: 37.1 (14 Aug 2019 14:22)  T(F): 98.8 (14 Aug 2019 14:22), Max: 98.8 (14 Aug 2019 14:22)  HR: 119 (14 Aug 2019 14:22) (119 - 125)  BP: 136/64 (14 Aug 2019 14:22) (112/68 - 136/64)  BP(mean): --  RR: 18 (14 Aug 2019 14:22) (18 - 18)  SpO2: 97% (14 Aug 2019 14:22) (96% - 97%)    I&O's Summary      PHYSICAL EXAM:  General: No acute distress  Respiratory: Nonlabored  Cardiovascular: RRR  Abdominal: Ostomy with contents in bag, wound vac with dressing in place, abdomen soft, tenderness to palpation to left side, no rebound, no gaurding.   Extremities: Warm    LABS:          Lactate:                  IMAGING:

## 2019-08-14 NOTE — ED ADULT NURSE NOTE - OBJECTIVE STATEMENT
74 y.o famel pmh a.flutter, pacemaker, asthma, CHF, DM, Diverticulitis, HTN     73 yo Female with PMH of asthma, A fib, HFrEF with perforated diverticulitis s/p L hemicolectomy and end colostomy with VAC placement along with multiple washouts and closure with bridging mesh p/w abdominal infection.  Patient presenting from Rehab after complicated surgical course.  over the past few days patient has had worsening abdominal wall pain, redness, swelling and discharge.  Denies fevers/chills, CP, SOB, NVD.    PAST MEDICAL/SURGICAL/FAMILY/SOCIAL HISTORY: 74 y.o female pmh a.flutter, pacemaker, asthma, CHF, DM, Diverticulitis, HTN, recent perforated diverticulitis that required emergent surgery with a wash out and colostomy placed with abdominal incision and wound vac in place presenting to ED from [facility] for concern of redness and warmth to the abdominal wound- not associated with any fevers or any additional s/s of infection. pt sent for full work up and evaluation, sent for ct scan of abdomen, abdominal wound culture and blood cultures. pt arrives accompanied by her daughter, afebrile, tachy to 120, denies any complaints of pain or discomfort. no nausea, vomiting, cp, sob, sick contacts, chills, or worsening mental status noted by the staff or daughter at bedside. pt awake, lethargic in appearance. BP stable. labs and blood cultures obtained via ultrasound guided IV access. lab results pending. pt pending ct scan and surgery consult. daughter remains at the bedside. safety and fall precautions maintained. call bell within reach.

## 2019-08-14 NOTE — ED PROVIDER NOTE - PMH
Asthma    Atrial flutter    Cardiac Pacemaker    Cardiomyopathy    Congestive Heart Failure    Diabetes    Diverticulitis    Gout    HTN - Hypertension    Kidney stone    Patient is Druze    Refusal of blood transfusions as patient is Druze    Vertigo

## 2019-08-14 NOTE — CHART NOTE - NSCHARTNOTEFT_GEN_A_CORE
Patient in emergency department - had not received CT abd/pelvis with oral and IV contrast. ED resident reports that patient had received the pre treatment for IV contrast however, on route to CT scan the patients IV infiltrated and radiology refusing to perform CT with IV contrast unless patient is pre treated again...    Patient does not need re peat pre treatment - contrast allergy previously was due to patients clinical condition of becoming septic last time she had IV contrast and she does not have true contrast allergy. Patient needs IV contrast with CT for evaluation of abd pain.   Dr. Alvarado aware; spoke to CT and ED regarding CT, new IV to be placed and IV contrast to be administered for CT.

## 2019-08-14 NOTE — ED PROVIDER NOTE - ATTENDING CONTRIBUTION TO CARE
73yo female pmh perforated diverticulitis s/p colectomy now with wound vac p/w abdominal pain, feeling unwell. Started on doxycyline yesterday. Abd soft, diffusely tender. Afebrile. Rectal temp. Labs with lactate. EKG A sensed V paced. Surgery consulted. Contrast allergy to IV, allergy most likely fluid overload as per past note. Will give pre-tx for CT. CXR for cough.

## 2019-08-14 NOTE — CONSULT NOTE ADULT - ASSESSMENT
A/P:     75 y/o F with Pmhx of HFrEF, s/p AICD, asthma, HTN, HLD, atrial fibrillation on eliquis, with previous perforated diverticulosis, Hartmans procedure with multiple RTOR for abd washouts, closure of abdominal incision with mesh (7/11/19), returns with abd pain:     Plan:     - F/U labs taken in ED   - Pre treat patient for PO and IV CT of abd/pelvis   - Pain control   - Will follow     General Surgery   [id number]

## 2019-08-14 NOTE — ED PROVIDER NOTE - OBJECTIVE STATEMENT
73 yo Female with PMH of asthma, A fib, HFrEF with perforated diverticulitis s/p L hemicolectomy and end colostomy with VAC placement along with multiple washouts and closure with bridging mesh p/w abdominal infection.  Patient presenting from Rehab after complicated surgical course.  over the past few days patient has had worsening abdominal wall pain, redness, swelling and discharge.  Denies fevers/chills, CP, SOB, NVD.

## 2019-08-14 NOTE — ED ADULT NURSE NOTE - NSIMPLEMENTINTERV_GEN_ALL_ED
Implemented All Fall with Harm Risk Interventions:  [place] to call system. Call bell, personal items and telephone within reach. Instruct patient to call for assistance. Room bathroom lighting operational. Non-slip footwear when patient is off stretcher. Physically safe environment: no spills, clutter or unnecessary equipment. Stretcher in lowest position, wheels locked, appropriate side rails in place. Provide visual cue, wrist band, yellow gown, etc. Monitor gait and stability. Monitor for mental status changes and reorient to person, place, and time. Review medications for side effects contributing to fall risk. Reinforce activity limits and safety measures with patient and family. Provide visual clues: red socks.

## 2019-08-15 DIAGNOSIS — R10.9 UNSPECIFIED ABDOMINAL PAIN: ICD-10-CM

## 2019-08-15 LAB
ANION GAP SERPL CALC-SCNC: 17 MMOL/L — SIGNIFICANT CHANGE UP (ref 5–17)
APTT BLD: 35.9 SEC — SIGNIFICANT CHANGE UP (ref 27.5–36.3)
BUN SERPL-MCNC: 40 MG/DL — HIGH (ref 7–23)
CALCIUM SERPL-MCNC: 8.5 MG/DL — SIGNIFICANT CHANGE UP (ref 8.4–10.5)
CHLORIDE SERPL-SCNC: 104 MMOL/L — SIGNIFICANT CHANGE UP (ref 96–108)
CO2 SERPL-SCNC: 19 MMOL/L — LOW (ref 22–31)
CREAT SERPL-MCNC: 0.83 MG/DL — SIGNIFICANT CHANGE UP (ref 0.5–1.3)
GLUCOSE SERPL-MCNC: 169 MG/DL — HIGH (ref 70–99)
HCT VFR BLD CALC: 32.4 % — LOW (ref 34.5–45)
HGB BLD-MCNC: 9.1 G/DL — LOW (ref 11.5–15.5)
INR BLD: 3.05 RATIO — HIGH (ref 0.88–1.16)
MAGNESIUM SERPL-MCNC: 1.9 MG/DL — SIGNIFICANT CHANGE UP (ref 1.6–2.6)
MCHC RBC-ENTMCNC: 28.2 GM/DL — LOW (ref 32–36)
MCHC RBC-ENTMCNC: 29.1 PG — SIGNIFICANT CHANGE UP (ref 27–34)
MCV RBC AUTO: 103 FL — HIGH (ref 80–100)
PHOSPHATE SERPL-MCNC: 4.9 MG/DL — HIGH (ref 2.5–4.5)
PLATELET # BLD AUTO: 208 K/UL — SIGNIFICANT CHANGE UP (ref 150–400)
POTASSIUM SERPL-MCNC: 4.7 MMOL/L — SIGNIFICANT CHANGE UP (ref 3.5–5.3)
POTASSIUM SERPL-SCNC: 4.7 MMOL/L — SIGNIFICANT CHANGE UP (ref 3.5–5.3)
PROTHROM AB SERPL-ACNC: 36.3 SEC — HIGH (ref 10–12.9)
RBC # BLD: 3.14 M/UL — LOW (ref 3.8–5.2)
RBC # FLD: 18.6 % — HIGH (ref 10.3–14.5)
SODIUM SERPL-SCNC: 140 MMOL/L — SIGNIFICANT CHANGE UP (ref 135–145)
WBC # BLD: 18.9 K/UL — HIGH (ref 3.8–10.5)
WBC # FLD AUTO: 18.9 K/UL — HIGH (ref 3.8–10.5)

## 2019-08-15 PROCEDURE — 99223 1ST HOSP IP/OBS HIGH 75: CPT

## 2019-08-15 PROCEDURE — 74018 RADEX ABDOMEN 1 VIEW: CPT | Mod: 26

## 2019-08-15 PROCEDURE — 99223 1ST HOSP IP/OBS HIGH 75: CPT | Mod: GC

## 2019-08-15 RX ORDER — DEXTROSE 50 % IN WATER 50 %
25 SYRINGE (ML) INTRAVENOUS ONCE
Refills: 0 | Status: DISCONTINUED | OUTPATIENT
Start: 2019-08-15 | End: 2019-08-27

## 2019-08-15 RX ORDER — VANCOMYCIN HCL 1 G
1000 VIAL (EA) INTRAVENOUS EVERY 12 HOURS
Refills: 0 | Status: DISCONTINUED | OUTPATIENT
Start: 2019-08-15 | End: 2019-08-16

## 2019-08-15 RX ORDER — MAGNESIUM OXIDE 400 MG ORAL TABLET 241.3 MG
400 TABLET ORAL
Refills: 0 | Status: DISCONTINUED | OUTPATIENT
Start: 2019-08-15 | End: 2019-08-27

## 2019-08-15 RX ORDER — GLUCAGON INJECTION, SOLUTION 0.5 MG/.1ML
1 INJECTION, SOLUTION SUBCUTANEOUS ONCE
Refills: 0 | Status: DISCONTINUED | OUTPATIENT
Start: 2019-08-15 | End: 2019-08-27

## 2019-08-15 RX ORDER — LORATADINE 10 MG/1
10 TABLET ORAL DAILY
Refills: 0 | Status: DISCONTINUED | OUTPATIENT
Start: 2019-08-15 | End: 2019-08-27

## 2019-08-15 RX ORDER — INSULIN LISPRO 100/ML
VIAL (ML) SUBCUTANEOUS EVERY 6 HOURS
Refills: 0 | Status: DISCONTINUED | OUTPATIENT
Start: 2019-08-15 | End: 2019-08-15

## 2019-08-15 RX ORDER — DEXTROSE 50 % IN WATER 50 %
15 SYRINGE (ML) INTRAVENOUS ONCE
Refills: 0 | Status: DISCONTINUED | OUTPATIENT
Start: 2019-08-15 | End: 2019-08-27

## 2019-08-15 RX ORDER — PHYTONADIONE (VIT K1) 5 MG
10 TABLET ORAL ONCE
Refills: 0 | Status: COMPLETED | OUTPATIENT
Start: 2019-08-15 | End: 2019-08-15

## 2019-08-15 RX ORDER — TIOTROPIUM BROMIDE 18 UG/1
1 CAPSULE ORAL; RESPIRATORY (INHALATION) DAILY
Refills: 0 | Status: DISCONTINUED | OUTPATIENT
Start: 2019-08-15 | End: 2019-08-27

## 2019-08-15 RX ORDER — DEXTROSE 50 % IN WATER 50 %
12.5 SYRINGE (ML) INTRAVENOUS ONCE
Refills: 0 | Status: DISCONTINUED | OUTPATIENT
Start: 2019-08-15 | End: 2019-08-27

## 2019-08-15 RX ORDER — FUROSEMIDE 40 MG
40 TABLET ORAL
Refills: 0 | Status: DISCONTINUED | OUTPATIENT
Start: 2019-08-15 | End: 2019-08-16

## 2019-08-15 RX ORDER — CHOLECALCIFEROL (VITAMIN D3) 125 MCG
400 CAPSULE ORAL DAILY
Refills: 0 | Status: DISCONTINUED | OUTPATIENT
Start: 2019-08-15 | End: 2019-08-27

## 2019-08-15 RX ORDER — SODIUM CHLORIDE 9 MG/ML
1000 INJECTION, SOLUTION INTRAVENOUS
Refills: 0 | Status: DISCONTINUED | OUTPATIENT
Start: 2019-08-15 | End: 2019-08-18

## 2019-08-15 RX ORDER — MEROPENEM 1 G/30ML
1000 INJECTION INTRAVENOUS EVERY 8 HOURS
Refills: 0 | Status: COMPLETED | OUTPATIENT
Start: 2019-08-15 | End: 2019-08-21

## 2019-08-15 RX ORDER — ALBUTEROL 90 UG/1
1 AEROSOL, METERED ORAL EVERY 4 HOURS
Refills: 0 | Status: DISCONTINUED | OUTPATIENT
Start: 2019-08-15 | End: 2019-08-16

## 2019-08-15 RX ORDER — SODIUM CHLORIDE 9 MG/ML
1000 INJECTION, SOLUTION INTRAVENOUS
Refills: 0 | Status: DISCONTINUED | OUTPATIENT
Start: 2019-08-15 | End: 2019-08-16

## 2019-08-15 RX ORDER — INSULIN GLARGINE 100 [IU]/ML
6 INJECTION, SOLUTION SUBCUTANEOUS AT BEDTIME
Refills: 0 | Status: DISCONTINUED | OUTPATIENT
Start: 2019-08-15 | End: 2019-08-25

## 2019-08-15 RX ORDER — MONTELUKAST 4 MG/1
10 TABLET, CHEWABLE ORAL DAILY
Refills: 0 | Status: DISCONTINUED | OUTPATIENT
Start: 2019-08-15 | End: 2019-08-27

## 2019-08-15 RX ORDER — PANTOPRAZOLE SODIUM 20 MG/1
40 TABLET, DELAYED RELEASE ORAL
Refills: 0 | Status: DISCONTINUED | OUTPATIENT
Start: 2019-08-15 | End: 2019-08-27

## 2019-08-15 RX ORDER — INSULIN LISPRO 100/ML
VIAL (ML) SUBCUTANEOUS AT BEDTIME
Refills: 0 | Status: DISCONTINUED | OUTPATIENT
Start: 2019-08-15 | End: 2019-08-16

## 2019-08-15 RX ORDER — ACETAMINOPHEN 500 MG
650 TABLET ORAL ONCE
Refills: 0 | Status: COMPLETED | OUTPATIENT
Start: 2019-08-15 | End: 2019-08-15

## 2019-08-15 RX ORDER — CHLORHEXIDINE GLUCONATE 213 G/1000ML
1 SOLUTION TOPICAL DAILY
Refills: 0 | Status: DISCONTINUED | OUTPATIENT
Start: 2019-08-15 | End: 2019-08-27

## 2019-08-15 RX ORDER — INSULIN LISPRO 100/ML
VIAL (ML) SUBCUTANEOUS
Refills: 0 | Status: DISCONTINUED | OUTPATIENT
Start: 2019-08-15 | End: 2019-08-16

## 2019-08-15 RX ORDER — ATORVASTATIN CALCIUM 80 MG/1
20 TABLET, FILM COATED ORAL AT BEDTIME
Refills: 0 | Status: DISCONTINUED | OUTPATIENT
Start: 2019-08-15 | End: 2019-08-27

## 2019-08-15 RX ORDER — IPRATROPIUM/ALBUTEROL SULFATE 18-103MCG
3 AEROSOL WITH ADAPTER (GRAM) INHALATION EVERY 6 HOURS
Refills: 0 | Status: DISCONTINUED | OUTPATIENT
Start: 2019-08-15 | End: 2019-08-18

## 2019-08-15 RX ADMIN — Medication 3 MILLILITER(S): at 06:35

## 2019-08-15 RX ADMIN — MEROPENEM 100 MILLIGRAM(S): 1 INJECTION INTRAVENOUS at 06:28

## 2019-08-15 RX ADMIN — ATORVASTATIN CALCIUM 20 MILLIGRAM(S): 80 TABLET, FILM COATED ORAL at 22:23

## 2019-08-15 RX ADMIN — MEROPENEM 100 MILLIGRAM(S): 1 INJECTION INTRAVENOUS at 23:47

## 2019-08-15 RX ADMIN — Medication 1: at 17:58

## 2019-08-15 RX ADMIN — SODIUM CHLORIDE 50 MILLILITER(S): 9 INJECTION, SOLUTION INTRAVENOUS at 23:48

## 2019-08-15 RX ADMIN — Medication 250 MILLIGRAM(S): at 06:32

## 2019-08-15 RX ADMIN — TIOTROPIUM BROMIDE 1 CAPSULE(S): 18 CAPSULE ORAL; RESPIRATORY (INHALATION) at 13:18

## 2019-08-15 RX ADMIN — INSULIN GLARGINE 6 UNIT(S): 100 INJECTION, SOLUTION SUBCUTANEOUS at 22:26

## 2019-08-15 RX ADMIN — Medication 250 MILLIGRAM(S): at 17:57

## 2019-08-15 RX ADMIN — MEROPENEM 100 MILLIGRAM(S): 1 INJECTION INTRAVENOUS at 13:19

## 2019-08-15 RX ADMIN — Medication 3 MILLILITER(S): at 23:49

## 2019-08-15 RX ADMIN — Medication 3 MILLILITER(S): at 13:19

## 2019-08-15 RX ADMIN — LORATADINE 10 MILLIGRAM(S): 10 TABLET ORAL at 13:19

## 2019-08-15 RX ADMIN — MAGNESIUM OXIDE 400 MG ORAL TABLET 400 MILLIGRAM(S): 241.3 TABLET ORAL at 17:56

## 2019-08-15 RX ADMIN — SODIUM CHLORIDE 50 MILLILITER(S): 9 INJECTION, SOLUTION INTRAVENOUS at 06:35

## 2019-08-15 RX ADMIN — Medication 3 MILLILITER(S): at 17:56

## 2019-08-15 RX ADMIN — MAGNESIUM OXIDE 400 MG ORAL TABLET 400 MILLIGRAM(S): 241.3 TABLET ORAL at 13:19

## 2019-08-15 RX ADMIN — Medication 100 MILLIGRAM(S): at 17:56

## 2019-08-15 RX ADMIN — Medication 400 UNIT(S): at 13:19

## 2019-08-15 RX ADMIN — Medication 650 MILLIGRAM(S): at 17:56

## 2019-08-15 RX ADMIN — MONTELUKAST 10 MILLIGRAM(S): 4 TABLET, CHEWABLE ORAL at 13:18

## 2019-08-15 RX ADMIN — Medication 102 MILLIGRAM(S): at 02:07

## 2019-08-15 NOTE — H&P ADULT - NSICDXPASTMEDICALHX_GEN_ALL_CORE_FT
PAST MEDICAL HISTORY:  Asthma     Atrial flutter     Cardiac Pacemaker     Cardiomyopathy     Congestive Heart Failure     Diabetes     Diverticulitis     Gout     HTN - Hypertension     Kidney stone     Patient is Scientologist     Refusal of blood transfusions as patient is Scientologist     Vertigo

## 2019-08-15 NOTE — H&P ADULT - NSHPPHYSICALEXAM_GEN_ALL_CORE
VITALS & I/Os:  Vital Signs Last 24 Hrs  T(C): 37.1 (14 Aug 2019 14:22), Max: 37.1 (14 Aug 2019 14:22)  T(F): 98.8 (14 Aug 2019 14:22), Max: 98.8 (14 Aug 2019 14:22)  HR: 119 (14 Aug 2019 14:22) (119 - 125)  BP: 136/64 (14 Aug 2019 14:22) (112/68 - 136/64)  BP(mean): --  RR: 18 (14 Aug 2019 14:22) (18 - 18)  SpO2: 97% (14 Aug 2019 14:22) (96% - 97%)    I&O's Summary      PHYSICAL EXAM:  General: No acute distress  Respiratory: Nonlabored on RA  Cardiovascular: RRR  Abdominal: abdomen soft, tenderness to palpation in LLQ, no rebound, no guarding, Ostomy functioning with contents in bag, wound vac with dressing removed, mesh in place with brown crusting, some cutaneous oozing, no cellulitis  Extremities: Warm and well perfused

## 2019-08-15 NOTE — PROGRESS NOTE ADULT - ASSESSMENT
ASSESSMENT  73 y/o F with Pmhx of HFrEF, s/p AICD, asthma, HTN, HLD, atrial fibrillation on eliquis, with previous perforated diverticulosis, Hartmans procedure with multiple RTOR for abd washouts, closure of abdominal incision with mesh (7/11/19), returns with abd pain found to have LLQ abdominal wall collection and intraabdominal fluid collection    Plan:   - Abx - vancomycin and meropenem  - Reconsult ID  - NPO with gentle IVF  - IR consultation- drainage of both LLQ and intraabdominal fluid collections  - Hold eliquis and reverse coagulopathy - patient received vitamin K 10, f/u coags this morning  - Wound care consultation    Green team  [id number]

## 2019-08-15 NOTE — CONSULT NOTE ADULT - SUBJECTIVE AND OBJECTIVE BOX
Patient is a 74y old  Female who presents with a chief complaint of     HPI:  7  4 year old female PMH of HFrEF, s/p AICD, asthma, HTN, HLD, atrial fibrillation on eliquis, with previous perforated diverticulosis, Hartmans procedure with multiple RTOR for abd washouts. Of note, admitted 6/30 - 8/2 for feculent peritonitis with septic shock secondary to perforated diverticulitis. Patient with multiple RTOR for washout. 7/2 washout cultures with  E. coli, AHS, Strep mitis and Enterococcus faecalis. On 7/9 surgical cultures with enterococcus and CoNS noted on 7/9 surgical cultures. Patient was initially treated with Ceftriaxone/Flagyl and then switched to Imipenem to complete course (given enterococcus and PCN allergy). 7/11 RTOR with no evidence of purulence/infection noted with closure of incisional hernia with biologic mesh, placement of wound vac.. CT C/A/P 7/28 with LLL PNA and no intraabdominal collections. Sputum Culture with Carbapenem resistant pseudomonas which was Fluoroquinolone susceptible - patient completed 7 day course.     In the ED afebrile, tachycardic to > 120 and hypotensive to systolics in the 90's. WBC on presentation of 19.8 with 72% PMN. Lactic acid of 2.5. CXR clear. CT A/P with new left lower ventral abdominal wall air-containing fluid collection with intraperitoneal communication to the level of the sigmoid colon stump and Additional enlargement of the left paracolic gutter collection. Started on Vancomycin and Meropenem.      prior hospital charts reviewed [  ]  primary team notes reviewed [  ]  other consultant notes reviewed [  ]    PAST MEDICAL & SURGICAL HISTORY:  Refusal of blood transfusions as patient is Evangelical  Patient is Evangelical  Vertigo  Atrial flutter  Diverticulitis  Cardiomyopathy  Kidney stone  Cardiac Pacemaker  HTN - Hypertension  Gout  Diabetes  Congestive Heart Failure  Asthma  S/P cholecystectomy  AICD (Automatic Cardioverter/Defibrillator) Present: inserted in Aug, 2008. Due for battery change in 1 month. ( [facility]) . Inserted by Dr Duffy      Allergies  Coreg (Other)  digoxin (Other; Short breath (Mild to Mod))  IV Contrast (Unknown)  penicillins (Hives)    ANTIMICROBIALS (past 90 days)  MEDICATIONS  (STANDING):  meropenem  IVPB   100 mL/Hr IV Intermittent (08-15-19 @ 06:28)    vancomycin  IVPB   250 mL/Hr IV Intermittent (08-15-19 @ 06:32)      ANTIMICROBIALS:    meropenem  IVPB 1000 every 8 hours  vancomycin  IVPB 1000 every 12 hours    OTHER MEDS: MEDICATIONS  (STANDING):  ALBUTerol    90 MICROgram(s) HFA Inhaler 1 every 4 hours  ALBUTerol/ipratropium for Nebulization 3 every 6 hours  atorvastatin 20 at bedtime  dextrose 40% Gel 15 once PRN  dextrose 50% Injectable 12.5 once  dextrose 50% Injectable 25 once  dextrose 50% Injectable 25 once  furosemide    Tablet 40 two times a day  glucagon  Injectable 1 once PRN  guaiFENesin    Syrup 100 every 6 hours PRN  insulin glargine Injectable (LANTUS) 6 at bedtime  insulin lispro (HumaLOG) corrective regimen sliding scale  every 6 hours  loratadine 10 daily  montelukast 10 daily  pantoprazole    Tablet 40 before breakfast  tiotropium 18 MICROgram(s) Capsule 1 daily  tiotropium 18 MICROgram(s) Capsule 1 daily    SOCIAL HISTORY:   hx smoking  non-smoker    FAMILY HISTORY:  Family history of brain tumor    REVIEW OF SYSTEMS  [  ] ROS unobtainable because:    [  ] All other systems negative except as noted below:	    Constitutional:  [ ] fever [ ] chills  [ ] weight loss  [ ] weakness  Skin:  [ ] rash [ ] phlebitis	  Eyes: [ ] icterus [ ] pain  [ ] discharge	  ENMT: [ ] sore throat  [ ] thrush [ ] ulcers [ ] exudates  Respiratory: [ ] dyspnea [ ] hemoptysis [ ] cough [ ] sputum	  Cardiovascular:  [ ] chest pain [ ] palpitations [ ] edema	  Gastrointestinal:  [ ] nausea [ ] vomiting [ ] diarrhea [ ] constipation [ ] pain	  Genitourinary:  [ ] dysuria [ ] frequency [ ] hematuria [ ] discharge [ ] flank pain  [ ] incontinence  Musculoskeletal:  [ ] myalgias [ ] arthralgias [ ] arthritis  [ ] back pain  Neurological:  [ ] headache [ ] seizures  [ ] confusion/altered mental status  Psychiatric:  [ ] anxiety [ ] depression	  Hematology/Lymphatics:  [ ] lymphadenopathy  Endocrine:  [ ] adrenal [ ] thyroid  Allergic/Immunologic:	 [ ] transplant [ ] seasonal    Vital Signs Last 24 Hrs  T(F): 97.5 (08-15-19 @ 06:28), Max: 98.8 (08-14-19 @ 14:22)  Vital Signs Last 24 Hrs  HR: 93 (08-15-19 @ 06:28) (87 - 125)  BP: 108/68 (08-15-19 @ 06:28) (90/53 - 136/64)  RR: 18 (08-15-19 @ 06:28)  SpO2: 97% (08-15-19 @ 06:28) (96% - 97%)  Wt(kg): --    PHYSICAL EXAMINATION:  General: Alert and Awake, NAD  HEENT: PERRL, EOMI, No subconjunctival hemorrhages, Oropharynx Clear, MMM  Neck: Supple, No SARAHI  Cardiac: RRR, No M/R/G  Resp: CTAB, No Wh/Rh/Ra  Abdomen: NBS, NT/ND, No HSM, No rigidity or guarding  MSK: No LE edema. No stigmata of IE. No evidence of phlebitis. No evidence of synovitis.  : No starr  Skin: No rashes or lesions. Skin is warm and dry to the touch.   Neuro: Alert and Awake. CN 2-12 Grossly intact. Moves all four extremities spontaneously.  Psych: Calm, Pleasant, Cooperative                          9.1    18.9  )-----------( 208      ( 15 Aug 2019 05:30 )             32.4     08-15    140  |  104  |  40<H>  ----------------------------<  169<H>  4.7   |  19<L>  |  0.83    Ca    8.5      15 Aug 2019 05:30  Phos  4.9     08-15  Mg     1.9     08-15    TPro  7.4  /  Alb  2.5<L>  /  TBili  0.6  /  DBili  x   /  AST  16  /  ALT  10  /  AlkPhos  131<H>  08-14    MICROBIOLOGY:    RADIOLOGY:  imaging below personally reviewed    EXAM:  XR CHEST PORTABLE URGENT 1V                        PROCEDURE DATE:  08/14/2019   Clear lungs.    EXAM:  CT ABDOMEN AND PELVIS IC                        PROCEDURE DATE:  08/14/2019   Status post left hemicolectomy and right lower quadrant ostomy. New left   lower ventral abdominal wall air-containing fluid collection with   intraperitoneal communication to the level of the sigmoid colon stump.   Additional enlargement of the left paracolic gutter collection, now   containing air. This raises the possibility of suture dehiscence at the   sigmoid colon stump. Recommend further evaluation with rectal contrast.  No bowel obstruction. Patient is a 74y old  Female who presents with a chief complaint of     HPI:    74 year old female PMH of HFrEF, s/p AICD, asthma, HTN, HLD, atrial fibrillation on eliquis, with previous perforated diverticulosis, Hartmans procedure with multiple RTOR for abd washouts. Of note, admitted 6/30 - 8/2 for feculent peritonitis with septic shock secondary to perforated diverticulitis. Patient with multiple RTOR for washout. 7/2 washout cultures with  E. coli, AHS, Strep mitis and Enterococcus faecalis. On 7/9 surgical cultures with enterococcus and CoNS noted on 7/9 surgical cultures. Patient was initially treated with Ceftriaxone/Flagyl and then switched to Imipenem to complete course (given enterococcus and PCN allergy). 7/11 RTOR with no evidence of purulence/infection noted with closure of incisional hernia with biologic mesh, placement of wound vac.. CT C/A/P 7/28 with LLL PNA and no intraabdominal collections. Sputum Culture with Carbapenem resistant pseudomonas which was Fluoroquinolone susceptible - patient completed 7 day course.     In the ED afebrile, tachycardic to > 120 and hypotensive to systolics in the 90's. WBC on presentation of 19.8 with 72% PMN. Lactic acid of 2.5. CXR clear. CT A/P with new left lower ventral abdominal wall air-containing fluid collection with intraperitoneal communication to the level of the sigmoid colon stump and Additional enlargement of the left paracolic gutter collection. Started on Vancomycin and Meropenem.      prior hospital charts reviewed [ x ]  primary team notes reviewed [ x ]  other consultant notes reviewed [ x ]    PAST MEDICAL & SURGICAL HISTORY:  Refusal of blood transfusions as patient is Protestant  Patient is Protestant  Vertigo  Atrial flutter  Diverticulitis  Cardiomyopathy  Kidney stone  Cardiac Pacemaker  HTN - Hypertension  Gout  Diabetes  Congestive Heart Failure  Asthma  S/P cholecystectomy  AICD (Automatic Cardioverter/Defibrillator) Present: inserted in Aug, 2008. Due for battery change in 1 month. ( [employer]) . Inserted by Dr Duffy      Allergies  Coreg (Other)  digoxin (Other; Short breath (Mild to Mod))  IV Contrast (Unknown)  penicillins (Hives)    ANTIMICROBIALS (past 90 days)  MEDICATIONS  (STANDING):  meropenem  IVPB   100 mL/Hr IV Intermittent (08-15-19 @ 06:28)    vancomycin  IVPB   250 mL/Hr IV Intermittent (08-15-19 @ 06:32)      ANTIMICROBIALS:    meropenem  IVPB 1000 every 8 hours  vancomycin  IVPB 1000 every 12 hours    OTHER MEDS: MEDICATIONS  (STANDING):  ALBUTerol    90 MICROgram(s) HFA Inhaler 1 every 4 hours  ALBUTerol/ipratropium for Nebulization 3 every 6 hours  atorvastatin 20 at bedtime  dextrose 40% Gel 15 once PRN  dextrose 50% Injectable 12.5 once  dextrose 50% Injectable 25 once  dextrose 50% Injectable 25 once  furosemide    Tablet 40 two times a day  glucagon  Injectable 1 once PRN  guaiFENesin    Syrup 100 every 6 hours PRN  insulin glargine Injectable (LANTUS) 6 at bedtime  insulin lispro (HumaLOG) corrective regimen sliding scale  every 6 hours  loratadine 10 daily  montelukast 10 daily  pantoprazole    Tablet 40 before breakfast  tiotropium 18 MICROgram(s) Capsule 1 daily  tiotropium 18 MICROgram(s) Capsule 1 daily    SOCIAL HISTORY:  No smoking, etoh use or drug use.     FAMILY HISTORY:  Family history of brain tumor    REVIEW OF SYSTEMS  [  ] ROS unobtainable because:    [  ] All other systems negative except as noted below:	    Constitutional:  [ ] fever [ ] chills  [ ] weight loss  [ ] weakness  Skin:  [ ] rash [ ] phlebitis	  Eyes: [ ] icterus [ ] pain  [ ] discharge	  ENMT: [ ] sore throat  [ ] thrush [ ] ulcers [ ] exudates  Respiratory: [ ] dyspnea [ ] hemoptysis [ ] cough [ ] sputum	  Cardiovascular:  [ ] chest pain [ ] palpitations [ ] edema	  Gastrointestinal:  [ ] nausea [ ] vomiting [ ] diarrhea [ ] constipation [ ] pain	  Genitourinary:  [ ] dysuria [ ] frequency [ ] hematuria [ ] discharge [ ] flank pain  [ ] incontinence  Musculoskeletal:  [ ] myalgias [ ] arthralgias [ ] arthritis  [ ] back pain  Neurological:  [ ] headache [ ] seizures  [ ] confusion/altered mental status  Psychiatric:  [ ] anxiety [ ] depression	  Hematology/Lymphatics:  [ ] lymphadenopathy  Endocrine:  [ ] adrenal [ ] thyroid  Allergic/Immunologic:	 [ ] transplant [ ] seasonal    Vital Signs Last 24 Hrs  T(F): 97.5 (08-15-19 @ 06:28), Max: 98.8 (08-14-19 @ 14:22)  Vital Signs Last 24 Hrs  HR: 93 (08-15-19 @ 06:28) (87 - 125)  BP: 108/68 (08-15-19 @ 06:28) (90/53 - 136/64)  RR: 18 (08-15-19 @ 06:28)  SpO2: 97% (08-15-19 @ 06:28) (96% - 97%)  Wt(kg): --    PHYSICAL EXAMINATION:  General: Alert and Awake, NAD  HEENT: PERRL, EOMI, No subconjunctival hemorrhages, Oropharynx Clear, MMM  Neck: Supple, No SARAHI  Cardiac: RRR, No M/R/G  Resp: CTAB, No Wh/Rh/Ra  Abdomen: +midline abdominal wound with mesh and some surrounding foul smelling drainage. NBS, ND, Mild diffuse tenderness to palpation, No HSM, No rigidity or guarding  MSK: Trace LE edema. No stigmata of IE. No evidence of phlebitis. No evidence of synovitis.  : External female urinary catheter  Skin: No rashes or lesions. Skin is warm and dry to the touch.   Neuro: Alert and Awake. CN 2-12 Grossly intact. Moves all four extremities spontaneously.  Psych: Calm, Pleasant, Cooperative                          9.1    18.9  )-----------( 208      ( 15 Aug 2019 05:30 )             32.4     08-15    140  |  104  |  40<H>  ----------------------------<  169<H>  4.7   |  19<L>  |  0.83    Ca    8.5      15 Aug 2019 05:30  Phos  4.9     08-15  Mg     1.9     08-15    TPro  7.4  /  Alb  2.5<L>  /  TBili  0.6  /  DBili  x   /  AST  16  /  ALT  10  /  AlkPhos  131<H>  08-14    MICROBIOLOGY:    RADIOLOGY:  imaging below personally reviewed    EXAM:  XR CHEST PORTABLE URGENT 1V                        PROCEDURE DATE:  08/14/2019   Clear lungs.    EXAM:  CT ABDOMEN AND PELVIS IC                        PROCEDURE DATE:  08/14/2019   Status post left hemicolectomy and right lower quadrant ostomy. New left   lower ventral abdominal wall air-containing fluid collection with   intraperitoneal communication to the level of the sigmoid colon stump.   Additional enlargement of the left paracolic gutter collection, now   containing air. This raises the possibility of suture dehiscence at the   sigmoid colon stump. Recommend further evaluation with rectal contrast.  No bowel obstruction.

## 2019-08-15 NOTE — H&P ADULT - HISTORY OF PRESENT ILLNESS
75 y/o F with Pmhx of HFrEF, s/p AICD, asthma, HTN, HLD, atrial fibrillation on eliquis, with previous perforated diverticulosis, Hartmans procedure with multiple RTOR for abd washouts. Last surgery 7/11/19, closure of incisional hernia with biologic mesh, placement of wound vac. Patient was discharged from hospital August 2nd and sent to rehab. Per the daughter, the patient began to have L sided abd pain and "cellulitis" to L abdomen. She also noted wound vac began to put out brown colored drainage. Reports one fever on Monday of 102.0, no fevers since. She has not been eating due to loss of appetite.    In the ED, WBC 19.8, there was significant delay in obtaining a CT of the abdomen, final read as listed below.

## 2019-08-15 NOTE — CONSULT NOTE ADULT - SUBJECTIVE AND OBJECTIVE BOX
Hematology Consult Note    HPI: 75 y/o F ([employer]ab[employer]ism) with Pmhx of HFrEF, s/p AICD, asthma, HTN, HLD, atrial fibrillation on Eliquis with previous perforated diverticulosis, Hartmans procedure with multiple RTOR for abd washouts. Last surgery 7/11/19, closure of incisional hernia with biologic mesh, placement of wound vac. Patient was discharged from hospital August 2nd and sent to rehab. Per the daughter, the patient began to have L sided abd pain and "cellulitis" to L abdomen. She also noted wound vac began to put out brown colored drainage. Reports one fever on Monday of 102.0, no fevers since. She has not been eating due to loss of appetite. Found to have LLQ abdominal wall collection and intraabdominal fluid collection; admitted to Sx; IR consulted for drainage of LLQ and intra-abdominal fluid collections. Hematology consulted in setting of elevated PT / coagulopathic state; received vitamin K in the ED with improvement in PT. Home Eliquis being held. Hematology previously consulted on last admission for anemia 2/2 septic shock, recent surgery, malnutrition, requiring IV iron and procrit.    PAST MEDICAL & SURGICAL HISTORY:  -exploratory laparotomy, left hemicolectomy, end colostomy, and ABThera VAC placement on 7/1 for proximal descending colon/splenic flexure acute diverticulitis with evidence of contained perforation  Refusal of blood transfusions as patient is Druze  Vertigo  Atrial flutter  Diverticulitis  Cardiomyopathy  Kidney stone  Cardiac Pacemaker  HTN - Hypertension  Gout  Diabetes  Congestive Heart Failure  Asthma  S/P cholecystectomy  AICD (Automatic Cardioverter/Defibrillator) Present: inserted in Aug, 2008. Due for battery change in 1 month. ( [employer]) . Inserted by Dr Duffy    FAMILY HISTORY:  Family history of brain tumor    MEDICATIONS  (STANDING):  acetaminophen   Tablet .. 650 milliGRAM(s) Oral once  ALBUTerol    90 MICROgram(s) HFA Inhaler 1 Puff(s) Inhalation every 4 hours  ALBUTerol/ipratropium for Nebulization 3 milliLiter(s) Nebulizer every 6 hours  atorvastatin 20 milliGRAM(s) Oral at bedtime  chlorhexidine 2% Cloths 1 Application(s) Topical daily  cholecalciferol 400 Unit(s) Oral daily  dextrose 5%. 1000 milliLiter(s) (50 mL/Hr) IV Continuous <Continuous>  dextrose 50% Injectable 12.5 Gram(s) IV Push once  dextrose 50% Injectable 25 Gram(s) IV Push once  dextrose 50% Injectable 25 Gram(s) IV Push once  furosemide    Tablet 40 milliGRAM(s) Oral two times a day  insulin glargine Injectable (LANTUS) 6 Unit(s) SubCutaneous at bedtime  insulin lispro (HumaLOG) corrective regimen sliding scale   SubCutaneous three times a day before meals  insulin lispro (HumaLOG) corrective regimen sliding scale   SubCutaneous at bedtime  lactated ringers. 1000 milliLiter(s) (50 mL/Hr) IV Continuous <Continuous>  loratadine 10 milliGRAM(s) Oral daily  magnesium oxide 400 milliGRAM(s) Oral three times a day with meals  meropenem  IVPB 1000 milliGRAM(s) IV Intermittent every 8 hours  montelukast 10 milliGRAM(s) Oral daily  pantoprazole    Tablet 40 milliGRAM(s) Oral before breakfast  tiotropium 18 MICROgram(s) Capsule 1 Capsule(s) Inhalation daily  tiotropium 18 MICROgram(s) Capsule 1 Capsule(s) Inhalation daily  vancomycin  IVPB 1000 milliGRAM(s) IV Intermittent every 12 hours    MEDICATIONS  (PRN):  dextrose 40% Gel 15 Gram(s) Oral once PRN Blood Glucose LESS THAN 70 milliGRAM(s)/deciliter  glucagon  Injectable 1 milliGRAM(s) IntraMuscular once PRN Glucose LESS THAN 70 milligrams/deciliter  guaiFENesin    Syrup 100 milliGRAM(s) Oral every 6 hours PRN Cough      Allergies    Coreg (Other)  digoxin (Other; Short breath (Mild to Mod))  IV Contrast (Unknown)  penicillins (Hives)    Intolerances    metoprolol (Other)  Solu-Medrol (Other (Mild to Mod))      SOCIAL HISTORY: No EtOH, no tobacco    REVIEW OF SYSTEMS:    CONSTITUTIONAL: +fevers on admission  NECK: No pain or stiffness  RESPIRATORY: +chest pain with deep breaths; no shortness of breath; hurts to cough  CARDIOVASCULAR: no palpitations  GASTROINTESTINAL: +abdominal pain (see HPI)  All other review of systems is negative unless indicated above.        T(F): 99.4 (08-15-19 @ 17:43), Max: 99.4 (08-15-19 @ 17:43)  HR: 105 (08-15-19 @ 17:43)  BP: 118/82 (08-15-19 @ 17:43)  RR: 18 (08-15-19 @ 17:43)  SpO2: 98% (08-15-19 @ 17:43)  Wt(kg): --      	PHYSICAL EXAM:  	General: Mild distress, appears uncomfortable  	Respiratory: Nonlabored on RA  	Cardiovascular: RRR, no m/r/g  	Abdominal: R-sided ostomy in place, wound vac dressing in place, no oozing of blood around dressing, NBS    Extremities: Warm and well perfused                          9.1    18.9  )-----------( 208      ( 15 Aug 2019 05:30 )             32.4       08-15    140  |  104  |  40<H>  ----------------------------<  169<H>  4.7   |  19<L>  |  0.83    Ca    8.5      15 Aug 2019 05:30  Phos  4.9     08-15  Mg     1.9     08-15    TPro  7.4  /  Alb  2.5<L>  /  TBili  0.6  /  DBili  x   /  AST  16  /  ALT  10  /  AlkPhos  131<H>  08-14      Magnesium, Serum: 1.9 mg/dL (08-15 @ 05:30)  Phosphorus Level, Serum: 4.9 mg/dL (08-15 @ 05:30)      < from: US Guided Vascular Access (ED) (08.14.19 @ 16:18) >    Procedure was performed in the Emergency Department by a credentialed   Emergency Medicine Attending Physician    EXAM:  ER US GUIDE VASC ACCESS      ORDER COMMENTS:      PROCEDURE DATE:  08/14/2019    FOCUSED ED ULTRASOUND REPORT      INTERPRETATION:  Location:  Right basilic vein  Dynamic gray scale imaging of the target vessel was obtained using a high   frequency linear transducer. Both the target vein and surrounding   arterial structures were visualized and identified. The patency of the   targeted vein was confirmed with compression and lack of internal echoes.   There was direct visualization of needle entry into the vein followed by   successful catheter placement.     IMPRESSION:  Ultrasound-guided cannulation of right basilicvein with a   20 gauge angiocatheter.    GAURANG LOZOYA ATTENDING EM PHYSICIAN  This document has been electronically signed. Aug 14 2019  4:19PM    < end of copied text >

## 2019-08-15 NOTE — H&P ADULT - ASSESSMENT
ASSESSMENT  75 y/o F with Pmhx of HFrEF, s/p AICD, asthma, HTN, HLD, atrial fibrillation on eliquis, with previous perforated diverticulosis, Hartmans procedure with multiple RTOR for abd washouts, closure of abdominal incision with mesh (7/11/19), returns with abd pain found to have LLQ abdominal wall collection and intraabdominal fluid collection    Plan:   - Admit to Dr. Alvarado  - Abx - vancomycin and meropenem  - Reconsult ID  - NPO with gentle IVF, patient with a history of HF  - IR consultation in the morning for drainage of both LLQ and intraabdominal fluid collections  - Hold eliquis and reverse coagulopathy - patient received vitamin K 10, will f/u coags in the morning  - Home medications - will hold spironolactone and enalapril for now  - Wound care consultation    General Surgery   [id number]

## 2019-08-15 NOTE — PROGRESS NOTE ADULT - ATTENDING COMMENTS
I have seen and evaluated the patient and discussed the relevant clinical findings and plan with the surgical housestaff and fellow.  Agree with the above documentation with addenda as noted.     74y F with recent history of ex lap, Lupe's procedure for perforated diverticulitis with sepsis.  She had multiple abdominal washouts and abdomen closed with strattice.  She has been in rehab now for some time and presented to ED with abdominal pain and malaise.  CT revealed fluid collection tracking from area of sigmoid stump to left anterior abdominal wall.      She is afebrile and more comfortable this morning.    Abdomen is soft.  Ostomy is viable and functioning.  There is edema and welling of the left abdominal wall.  The strattice is desiccated but there is healthy granulating tissue at lateral skin edges and the abdominal wound is jamie nicely.      IV abx as per ID  IR consultation for drainage of fluid collections  Will restart diet following procedure  Wound care    Clark Alvarado MD

## 2019-08-15 NOTE — CONSULT NOTE ADULT - ASSESSMENT
73 y/o F ([employer]) with Pmhx of HFrEF, s/p AICD, asthma, HTN, HLD, atrial fibrillation on Eliquis, with previous perforated diverticulosis, Hartmans procedure with multiple RTOR for abd washouts, closure of abdominal incision with mesh (7/11/19), returns with abd pain found to have LLQ abdominal wall collection and intraabdominal fluid collection; hematology consulted for elevated PT likely 2/2 poor nutritional status    #Elevated PT  -given response to vitamin K given in the ED and hx of poor nutritional intake of the past month (requiring course of TPN), suspect most likely 2/2 vitamin K deficiency  -can check mixing study with next set of labs to r/o other coagulation factor deficiencies; continue to monitor PT, PPT, INR until at goal for surgical / IR intervention  -per IR, goal INR <2 prior to intervention for intraabdominal fluid collection; synthetic function appears normal, can give vitamin K PO 2.5 mg x1 tonight and check INR; if still above goal, can give additional 2.5 mg PO    #Macrocytic anemia, S[place]  -suspect macrocytosis likely 2/2 nutritional deficiency as above; please check reticulocyte index with next CBC; check folate, B12  -given IV iron on last admission; check ferritin x1 to evaluate iron stores  -try to minimize phlebotomy if possible; if CBC stable, ok to check every other day using pediatric phlebotomy tubes    Marty Degroot MD  Hematology / Oncology PGY4  p  75 y/o F ([employer]ism) with Pmhx of HFrEF, s/p AICD, asthma, HTN, HLD, atrial fibrillation on Eliquis, with previous perforated diverticulosis, Hartmans procedure with multiple RTOR for abd washouts, closure of abdominal incision with mesh (7/11/19), returns with abd pain found to have LLQ abdominal wall collection and intraabdominal fluid collection; hematology consulted for elevated PT likely 2/2 poor nutritional status    #Elevated PT  -given response to vitamin K given in the ED and hx of poor nutritional intake of the past month (requiring course of TPN), suspect most likely 2/2 vitamin K deficiency  -can check mixing study with next set of labs to r/o other coagulation factor deficiencies; continue to monitor PT, PPT, INR until at goal for surgical / IR intervention  -per IR, goal INR <2 prior to intervention for intraabdominal fluid collection; synthetic function appears normal, can give vitamin K PO 10 mg x1 tonight and check INR; if still above goal, can give additional 10 mg PO    #Macrocytic anemia, [employer]ism  -suspect macrocytosis likely 2/2 nutritional deficiency as above; please check reticulocyte index with next CBC; check folate, B12  -given IV iron on last admission; check ferritin x1 to evaluate iron stores  -try to minimize phlebotomy if possible; if CBC stable, ok to check every other day using pediatric phlebotomy tubes    Marty Degroot MD  Hematology / Oncology PGY4  p

## 2019-08-15 NOTE — H&P ADULT - NSICDXPASTSURGICALHX_GEN_ALL_CORE_FT
PAST SURGICAL HISTORY:  AICD (Automatic Cardioverter/Defibrillator) Present inserted in Aug, 2008. Due for battery change in 1 month. ( [employer]) . Inserted by Dr Duffy    S/P cholecystectomy

## 2019-08-15 NOTE — H&P ADULT - NSHPLABSRESULTS_GEN_ALL_CORE
Labs:                        10.5   19.8  )-----------( 261      ( 14 Aug 2019 15:41 )             37.3     PT/INR - ( 14 Aug 2019 15:41 )   PT: 45.6 sec;   INR: 3.80 ratio         PTT - ( 14 Aug 2019 15:41 )  PTT:37.9 sec  08-14    138  |  101  |  31<H>  ----------------------------<  112<H>  4.8   |  22  |  0.94    Ca    8.7      14 Aug 2019 15:41    TPro  7.4  /  Alb  2.5<L>  /  TBili  0.6  /  DBili  x   /  AST  16  /  ALT  10  /  AlkPhos  131<H>  08-14            Imaging and other studies:  < from: CT Abdomen and Pelvis w/ IV Cont (08.14.19 @ 22:11) >    FINDINGS:    LOWER CHEST: Linear atelectasis versus scarring of the right middle lobe.   Patchy tree-in-bud opacities in the left lower lobe, somewhat improved.    LIVER:Within normal limits.  BILE DUCTS: Normal caliber.  GALLBLADDER: Cholecystectomy.  SPLEEN: Within normal limits.  PANCREAS: Within normal limits.  ADRENALS: Within normal limits.  KIDNEYS/URETERS: Nonobstructing left renal calculi. The right kidney is   within normal limits.    BLADDER: Within normal limits.  REPRODUCTIVE ORGANS: Calcified fibroid uterus.    BOWEL/ABDOMINAL WALL: Patient is status post left hemicolectomy with   right lower quadrant ostomy. There is no bowel obstruction. There is no   abnormal bowel wall thickening. Interval removal of anterior pelvic   surgical drain entering via the left lower quadrant. In the location of   the prior drain insertion in the left lower ventral abdominal wall there   is a new large air-containing fluid collection which has intraperitoneal   extension at the inferior aspect of the large midline ventral hernia   which terminates near the sigmoid. There appears to be air tracking from   the suture line to the collection. An additional collection in the left   paracolic gutter appears to be have been enlarged and now contains air   measuring approximately 5.6 x 5.2 x 10.4 cm (prior 4.0 x 3.4 x 7.5 cm) AP   by transverse by craniocaudad. A small fluid collection in the right   lower ventral abdominal wall below the ostomy site appears smaller now   measuring 4.5 x 2.7 cm (prior 6.0 x 3.6 cm).    PERITONEUM: No ascites. No free intraperitoneal air.  VESSELS: Atherosclerotic calcifications.  RETROPERITONEUM/LYMPH NODES: No lymphadenopathy.   BONES: Within normal limits.    IMPRESSION:   Status post left hemicolectomy and right lower quadrant ostomy. New left   lower ventral abdominal wall air-containing fluid collection with   intraperitoneal communication to the level of the sigmoid colon stump.   Additional enlargement of the left paracolic gutter collection, now   containing air. This raises the possibility of suture dehiscence at the   sigmoid colon stump. Recommend further evaluation with rectal contrast.  No bowel obstruction.      < end of copied text >

## 2019-08-15 NOTE — CONSULT NOTE ADULT - ASSESSMENT
74 year old female PMH of HFrEF, s/p AICD, asthma, HTN, HLD, atrial fibrillation on eliquis, with previous perforated diverticulosis, Hartmans procedure with multiple RTOR for abd washouts. Admitted 6/30 - 8/2 for feculent peritonitis with septic shock secondary to perforated diverticulitis with hospital course c/b HCAP 2/2 CR Pseudomonas. In the ED afebrile, tachycardic to > 120 and hypotensive to systolics in the 90's. WBC on presentation of 19.8 with 72% PMN. Lactic acid of 2.5. CXR clear. CT A/P with new left lower ventral abdominal wall air-containing fluid collection with intraperitoneal communication to the level of the sigmoid colon stump and Additional enlargement of the left paracolic gutter collection.     Overall, Intraabdominal abscess with possible sigmoid colon stump dehiscence. Agree with Vancomycin and Meropenem at this point. Followup on surgical vs. IR plan for source control    #Intraabdominal Abscesses  --Continue Meropenem 1g IV 8H  --Continue Vancomycin 1g IV Q12H. Trough prior to fourth dose  --Followup on surgical vs. IR plan - recommend bacterial cultures if drainage pursued  --Follow up on prelim BCx    #Leukocytosis - related to intraabdominal abscess  --Continue antibiotics as above  --Continue to trend WBC    I will continue to follow. Please feel free to contact me with any further questions.    Rex Goode M.D.  [facility] Division of Infectious Disease  8AM-5PM: Pager Number [phone]  After Hours (or if no response): Please contact the Infectious Diseases Office at [phone]

## 2019-08-15 NOTE — PROGRESS NOTE ADULT - SUBJECTIVE AND OBJECTIVE BOX
Interval Events: Patient admitted for abdominal pain with previous history multiple RTOR for abd washouts and perforated diverticulosis    S: Patient reports abdominal pain and a decrease in appetite. She denies fevers, chills, chest pain, SOB.    O: Vital Signs  T(C): 36.3 (08-15 @ 03:35), Max: 37.1 (08-14 @ 14:22)  HR: 99 (08-15 @ 03:35) (87 - 125)  BP: 110/73 (08-15 @ 03:35) (90/53 - 136/64)  RR: 18 (08-15 @ 03:35) (18 - 22)  SpO2: 96% (08-15 @ 03:35) (96% - 97%)  General: alert and oriented, NAD  Resp: airway patent, respirations unlabored  CVS: regular rate and rhythm  Abdomen: abdomen soft, tenderness to palpation in LLQ, no rebound, no guarding, Ostomy functioning with contents in bag  Extremities: no edema  Skin: warm, dry, appropriate color                          10.5   19.8  )-----------( 261      ( 14 Aug 2019 15:41 )             37.3   08-14    138  |  101  |  31<H>  ----------------------------<  112<H>  4.8   |  22  |  0.94    Ca    8.7      14 Aug 2019 15:41    TPro  7.4  /  Alb  2.5<L>  /  TBili  0.6  /  DBili  x   /  AST  16  /  ALT  10  /  AlkPhos  131<H>  08-14    CT: Status post left hemicolectomy and right lower quadrant ostomy. New left   	lower ventral abdominal wall air-containing fluid collection with   	intraperitoneal communication to the level of the sigmoid colon stump.   	Additional enlargement of the left paracolic gutter collection, now   	containing air. This raises the possibility of suture dehiscence at the   	sigmoid colon stump. Recommend further evaluation with rectal contrast.  	No bowel obstruction.

## 2019-08-16 ENCOUNTER — APPOINTMENT (OUTPATIENT)
Dept: SURGERY | Facility: CLINIC | Age: 74
End: 2019-08-16

## 2019-08-16 DIAGNOSIS — T81.49XA INFECTION FOLLOWING A PROCEDURE, OTHER SURGICAL SITE, INITIAL ENCOUNTER: ICD-10-CM

## 2019-08-16 LAB
-  COAGULASE NEGATIVE STAPHYLOCOCCUS: SIGNIFICANT CHANGE UP
ANION GAP SERPL CALC-SCNC: 13 MMOL/L — SIGNIFICANT CHANGE UP (ref 5–17)
APTT BLD: 31.4 SEC — SIGNIFICANT CHANGE UP (ref 27.5–36.3)
BUN SERPL-MCNC: 40 MG/DL — HIGH (ref 7–23)
CALCIUM SERPL-MCNC: 8.2 MG/DL — LOW (ref 8.4–10.5)
CHLORIDE SERPL-SCNC: 101 MMOL/L — SIGNIFICANT CHANGE UP (ref 96–108)
CO2 SERPL-SCNC: 23 MMOL/L — SIGNIFICANT CHANGE UP (ref 22–31)
CREAT SERPL-MCNC: 1.2 MG/DL — SIGNIFICANT CHANGE UP (ref 0.5–1.3)
CULTURE RESULTS: SIGNIFICANT CHANGE UP
FERRITIN SERPL-MCNC: >2000 NG/ML — HIGH (ref 15–150)
FOLATE SERPL-MCNC: 13.1 NG/ML — SIGNIFICANT CHANGE UP
GLUCOSE SERPL-MCNC: 127 MG/DL — HIGH (ref 70–99)
GRAM STN FLD: SIGNIFICANT CHANGE UP
HBA1C BLD-MCNC: 5.2 % — SIGNIFICANT CHANGE UP (ref 4–5.6)
HCT VFR BLD CALC: 31.8 % — LOW (ref 34.5–45)
HGB BLD-MCNC: 9.9 G/DL — LOW (ref 11.5–15.5)
INR BLD: 1.85 RATIO — HIGH (ref 0.88–1.16)
LACTATE SERPL-SCNC: 2.2 MMOL/L — HIGH (ref 0.7–2)
MAGNESIUM SERPL-MCNC: 1.9 MG/DL — SIGNIFICANT CHANGE UP (ref 1.6–2.6)
MCHC RBC-ENTMCNC: 31.2 GM/DL — LOW (ref 32–36)
MCHC RBC-ENTMCNC: 31.6 PG — SIGNIFICANT CHANGE UP (ref 27–34)
MCV RBC AUTO: 101 FL — HIGH (ref 80–100)
METHOD TYPE: SIGNIFICANT CHANGE UP
ORGANISM # SPEC MICROSCOPIC CNT: SIGNIFICANT CHANGE UP
ORGANISM # SPEC MICROSCOPIC CNT: SIGNIFICANT CHANGE UP
PHOSPHATE SERPL-MCNC: 2.7 MG/DL — SIGNIFICANT CHANGE UP (ref 2.5–4.5)
PLATELET # BLD AUTO: 278 K/UL — SIGNIFICANT CHANGE UP (ref 150–400)
POTASSIUM SERPL-MCNC: 4.5 MMOL/L — SIGNIFICANT CHANGE UP (ref 3.5–5.3)
POTASSIUM SERPL-SCNC: 4.5 MMOL/L — SIGNIFICANT CHANGE UP (ref 3.5–5.3)
PROTHROM AB SERPL-ACNC: 21.5 SEC — HIGH (ref 10–12.9)
RBC # BLD: 3.15 M/UL — LOW (ref 3.8–5.2)
RBC # FLD: 18.7 % — HIGH (ref 10.3–14.5)
SODIUM SERPL-SCNC: 137 MMOL/L — SIGNIFICANT CHANGE UP (ref 135–145)
SPECIMEN SOURCE: SIGNIFICANT CHANGE UP
VANCOMYCIN TROUGH SERPL-MCNC: 28.1 UG/ML — CRITICAL HIGH (ref 10–20)
VIT B12 SERPL-MCNC: >2000 PG/ML — HIGH (ref 232–1245)
WBC # BLD: 20.3 K/UL — HIGH (ref 3.8–10.5)
WBC # FLD AUTO: 20.3 K/UL — HIGH (ref 3.8–10.5)

## 2019-08-16 PROCEDURE — 99232 SBSQ HOSP IP/OBS MODERATE 35: CPT

## 2019-08-16 PROCEDURE — 10030 IMG GID FLU COLL DRG SFT TIS: CPT

## 2019-08-16 PROCEDURE — 76942 ECHO GUIDE FOR BIOPSY: CPT | Mod: 26,XU

## 2019-08-16 PROCEDURE — 49406 IMAGE CATH FLUID PERI/RETRO: CPT

## 2019-08-16 PROCEDURE — 99024 POSTOP FOLLOW-UP VISIT: CPT

## 2019-08-16 PROCEDURE — 71045 X-RAY EXAM CHEST 1 VIEW: CPT | Mod: 26

## 2019-08-16 PROCEDURE — 10160 PNXR ASPIR ABSC HMTMA BULLA: CPT | Mod: XS

## 2019-08-16 PROCEDURE — 93010 ELECTROCARDIOGRAM REPORT: CPT

## 2019-08-16 RX ORDER — INSULIN LISPRO 100/ML
VIAL (ML) SUBCUTANEOUS
Refills: 0 | Status: DISCONTINUED | OUTPATIENT
Start: 2019-08-16 | End: 2019-08-21

## 2019-08-16 RX ORDER — PHYTONADIONE (VIT K1) 5 MG
10 TABLET ORAL DAILY
Refills: 0 | Status: DISCONTINUED | OUTPATIENT
Start: 2019-08-16 | End: 2019-08-20

## 2019-08-16 RX ORDER — MAGNESIUM SULFATE 500 MG/ML
1 VIAL (ML) INJECTION ONCE
Refills: 0 | Status: DISCONTINUED | OUTPATIENT
Start: 2019-08-16 | End: 2019-08-16

## 2019-08-16 RX ORDER — INSULIN LISPRO 100/ML
VIAL (ML) SUBCUTANEOUS EVERY 6 HOURS
Refills: 0 | Status: DISCONTINUED | OUTPATIENT
Start: 2019-08-16 | End: 2019-08-16

## 2019-08-16 RX ORDER — INSULIN LISPRO 100/ML
VIAL (ML) SUBCUTANEOUS AT BEDTIME
Refills: 0 | Status: DISCONTINUED | OUTPATIENT
Start: 2019-08-16 | End: 2019-08-21

## 2019-08-16 RX ORDER — HEPARIN SODIUM 5000 [USP'U]/ML
5000 INJECTION INTRAVENOUS; SUBCUTANEOUS EVERY 8 HOURS
Refills: 0 | Status: DISCONTINUED | OUTPATIENT
Start: 2019-08-16 | End: 2019-08-19

## 2019-08-16 RX ORDER — MAGNESIUM SULFATE 500 MG/ML
1 VIAL (ML) INJECTION ONCE
Refills: 0 | Status: COMPLETED | OUTPATIENT
Start: 2019-08-16 | End: 2019-08-16

## 2019-08-16 RX ORDER — BUDESONIDE AND FORMOTEROL FUMARATE DIHYDRATE 160; 4.5 UG/1; UG/1
2 AEROSOL RESPIRATORY (INHALATION)
Refills: 0 | Status: DISCONTINUED | OUTPATIENT
Start: 2019-08-16 | End: 2019-08-27

## 2019-08-16 RX ADMIN — ATORVASTATIN CALCIUM 20 MILLIGRAM(S): 80 TABLET, FILM COATED ORAL at 22:01

## 2019-08-16 RX ADMIN — MEROPENEM 100 MILLIGRAM(S): 1 INJECTION INTRAVENOUS at 22:02

## 2019-08-16 RX ADMIN — Medication 3 MILLILITER(S): at 06:15

## 2019-08-16 RX ADMIN — Medication 10 MILLIGRAM(S): at 17:13

## 2019-08-16 RX ADMIN — BUDESONIDE AND FORMOTEROL FUMARATE DIHYDRATE 2 PUFF(S): 160; 4.5 AEROSOL RESPIRATORY (INHALATION) at 18:50

## 2019-08-16 RX ADMIN — Medication 250 MILLIGRAM(S): at 06:13

## 2019-08-16 RX ADMIN — CHLORHEXIDINE GLUCONATE 1 APPLICATION(S): 213 SOLUTION TOPICAL at 13:08

## 2019-08-16 RX ADMIN — HEPARIN SODIUM 5000 UNIT(S): 5000 INJECTION INTRAVENOUS; SUBCUTANEOUS at 22:01

## 2019-08-16 RX ADMIN — TIOTROPIUM BROMIDE 1 CAPSULE(S): 18 CAPSULE ORAL; RESPIRATORY (INHALATION) at 13:27

## 2019-08-16 RX ADMIN — MEROPENEM 100 MILLIGRAM(S): 1 INJECTION INTRAVENOUS at 06:11

## 2019-08-16 RX ADMIN — INSULIN GLARGINE 6 UNIT(S): 100 INJECTION, SOLUTION SUBCUTANEOUS at 22:02

## 2019-08-16 RX ADMIN — Medication 3 MILLILITER(S): at 17:15

## 2019-08-16 RX ADMIN — Medication 62.5 MILLIMOLE(S): at 15:40

## 2019-08-16 RX ADMIN — Medication 100 GRAM(S): at 13:23

## 2019-08-16 RX ADMIN — SODIUM CHLORIDE 50 MILLILITER(S): 9 INJECTION, SOLUTION INTRAVENOUS at 06:10

## 2019-08-16 RX ADMIN — MEROPENEM 100 MILLIGRAM(S): 1 INJECTION INTRAVENOUS at 13:24

## 2019-08-16 RX ADMIN — Medication 3 MILLILITER(S): at 13:01

## 2019-08-16 NOTE — PROVIDER CONTACT NOTE (OTHER) - RECOMMENDATIONS
MD aware. Come to bedside to assess pt? Lower fluid rate? MD aware. Come to bedside to assess pt? Lower fluid rate? Chest xray?

## 2019-08-16 NOTE — PROGRESS NOTE ADULT - SUBJECTIVE AND OBJECTIVE BOX
Follow Up:  Intraabdominal Abscess    Interval History: Afebrile overnight. Went to IR and had 2 LLQ drains placed.     REVIEW OF SYSTEMS  [  ] ROS unobtainable because:    [  x] All other systems negative except as noted below    Constitutional:  [ ] fever [ ] chills  [ ] weight loss  [ ] weakness  Skin:  [ ] rash [ ] phlebitis	  Eyes: [ ] icterus [ ] pain  [ ] discharge	  ENMT: [ ] sore throat  [ ] thrush [ ] ulcers [ ] exudates  Respiratory: [ ] dyspnea [ ] hemoptysis [x ] cough [ ] sputum	  Cardiovascular:  [ ] chest pain [ ] palpitations [ ] edema	  Gastrointestinal:  [ ] nausea [ ] vomiting [ ] diarrhea [ ] constipation [ x] pain	  Genitourinary:  [ ] dysuria [ ] frequency [ ] hematuria [ ] discharge [ ] flank pain  [ ] incontinence  Musculoskeletal:  [ ] myalgias [ ] arthralgias [ ] arthritis  [ ] back pain  Neurological:  [ ] headache [ ] seizures  [ ] confusion/altered mental status    Allergies  Coreg (Other)  digoxin (Other; Short breath (Mild to Mod))  IV Contrast (Unknown)  penicillins (Hives)        ANTIMICROBIALS:  meropenem  IVPB 1000 every 8 hours  vancomycin  IVPB 1000 every 12 hours      OTHER MEDS:  MEDICATIONS  (STANDING):  ALBUTerol/ipratropium for Nebulization 3 every 6 hours  atorvastatin 20 at bedtime  buDESOnide  80 MICROgram(s)/formoterol 4.5 MICROgram(s) Inhaler 2 two times a day  dextrose 40% Gel 15 once PRN  dextrose 50% Injectable 12.5 once  dextrose 50% Injectable 25 once  dextrose 50% Injectable 25 once  furosemide    Tablet 40 two times a day  glucagon  Injectable 1 once PRN  guaiFENesin    Syrup 100 every 6 hours PRN  insulin glargine Injectable (LANTUS) 6 at bedtime  insulin lispro (HumaLOG) corrective regimen sliding scale  every 6 hours  loratadine 10 daily  montelukast 10 daily  pantoprazole    Tablet 40 before breakfast  tiotropium 18 MICROgram(s) Capsule 1 daily  tiotropium 18 MICROgram(s) Capsule 1 daily      Vital Signs Last 24 Hrs  T(C): 37 (16 Aug 2019 12:53), Max: 37.4 (15 Aug 2019 17:43)  T(F): 98.6 (16 Aug 2019 12:53), Max: 99.4 (15 Aug 2019 17:43)  HR: 98 (16 Aug 2019 12:53) (98 - 109)  BP: 112/70 (16 Aug 2019 12:53) (96/60 - 118/82)  BP(mean): --  RR: 20 (16 Aug 2019 12:53) (18 - 22)  SpO2: 95% (16 Aug 2019 12:53) (95% - 98%)    PHYSICAL EXAMINATION:  General: Alert and Awake, NAD  HEENT: PERRL, EOMI, No subconjunctival hemorrhages, Oropharynx Clear, MMM  Neck: Supple, No SARAHI  Cardiac: RRR, No M/R/G  Resp: CTAB, No Wh/Rh/Ra  Abdomen: +midline abdominal wound with mesh and some surrounding foul smelling drainage. 2 LLQ drains with mixed blood and brown liquid. NBS, ND, Mild diffuse tenderness to palpation, No HSM, No rigidity or guarding  MSK: Trace LE edema. No stigmata of IE. No evidence of phlebitis. No evidence of synovitis.  : External female urinary catheter  Skin: No rashes or lesions. Skin is warm and dry to the touch.   Neuro: Alert and Awake. CN 2-12 Grossly intact. Moves all four extremities spontaneously.  Psych: Calm, Pleasant, Cooperative                            9.9    20.3  )-----------( 278      ( 16 Aug 2019 05:08 )             31.8       08-16    137  |  101  |  40<H>  ----------------------------<  127<H>  4.5   |  23  |  1.20    Ca    8.2<L>      16 Aug 2019 05:06  Phos  2.7     08-16  Mg     1.9     08-16    TPro  7.4  /  Alb  2.5<L>  /  TBili  0.6  /  DBili  x   /  AST  16  /  ALT  10  /  AlkPhos  131<H>  08-14      MICROBIOLOGY:    .Blood  08-14-19   Growth in aerobic bottle: Gram Positive Cocci in Clusters  "Due to technical problems, Proteus sp. will Not be reported as part of  the BCID panel until further notice"  ***Blood Panel PCR results on this specimen are available  approximately 3 hours after the Gram stain result.***  Gram stain, PCR, and/or culture results may not always  correspond due to difference in methodologies.  ************************************************************  This PCR assay was performed using [employer].  The following targets are tested for: Enterococcus,  vancomycin resistant enterococci, Listeria monocytogenes,  coagulase negative staphylococci, S. aureus,  methicillin resistant S. aureus, Streptococcus agalactiae  (Group B), S. pneumoniae, S.pyogenes (Group A),  Acinetobacter baumannii, Enterobacter cloacae, E. coli,  Klebsiella oxytoca, K. pneumoniae, Proteus sp.,  Serratia marcescens, Haemophilus influenzae,  Neisseria meningitidis, Pseudomonas aeruginosa, Candida  albicans, C. glabrata, C krusei, C parapsilosis,  C. tropicalis and the KPC resistance gene.  --  Blood Culture PCR      .Sputum  07-29-19   Moderate Pseudomonas aeruginosa (Carbapenem Resistant) #2  Normal Respiratory Liz present  --  Pseudomonas aeruginosa (Carbapenem Resistant)      .Urine  07-24-19   No growth  --  --      .Body Fluid  07-23-19   Numerous Escherichia coli  Few Coag Negative Staphylococcus  Numerous Escherichia coli #2  --  Escherichia coli  Escherichia coli    RADIOLOGY:    EXAM:  XR CHEST PORTABLE URGENT 1V                        PROCEDURE DATE:  08/16/2019    Clear lungs    EXAM:  CT ABDOMEN AND PELVIS IC                        PROCEDURE DATE:  08/14/2019   Status post left hemicolectomy and right lower quadrant ostomy. New left   lower ventral abdominal wall air-containing fluid collection with   intraperitoneal communication to the level of the sigmoid colon stump.   Additional enlargement of the left paracolic gutter collection, now   containing air. This raises the possibility of suture dehiscence at the   sigmoid colon stump. Recommend further evaluation with rectal contrast.  No bowel obstruction.

## 2019-08-16 NOTE — CONSULT NOTE ADULT - SUBJECTIVE AND OBJECTIVE BOX
PULMONARY CONSULT  Bartolome Manuel MD  [phone]    Initial HPI on admission:  HPI:  73 y/o F with Pmhx of HFrEF, s/p AICD, asthma, HTN, HLD, atrial fibrillation on eliquis, with previous perforated diverticulosis, Hartmans procedure with multiple RTOR for abd washouts. Last surgery 7/11/19, closure of incisional hernia with biologic mesh, placement of wound vac. Patient was discharged from hospital August 2nd and sent to rehab. Per the daughter, the patient began to have L sided abd pain and "cellulitis" to L abdomen. She also noted wound vac began to put out brown colored drainage. Reports one fever on Monday of 102.0, no fevers since. She has not been eating due to loss of appetite.  In the ED, WBC 19.8, there was significant delay in obtaining a CT of the abdomen, final read as listed below.     History obtained from daughter at bedside: Patient is non smoker with long standing of chronic persistent asthma/COPD - triggered with animal dander, URI, dusts. She uses duoneb nebulizer at home and occasional has been placed on prednisone for exacerbations. She was treated with LLL pneumonia on recent admission with resistent PA. She is currently s/p IR drainage of abd wall and intra-abd purulent collections. Seen at bedside, she is sleep and rousable: appears to be breathing comfortably on RA with O2 sat 96%    PAST MEDICAL & SURGICAL HISTORY:  Refusal of blood transfusions as patient is Pentecostal  Patient is Pentecostal  Vertigo  Atrial flutter  Diverticulitis  Cardiomyopathy  Kidney stone  Cardiac Pacemaker  HTN - Hypertension  Gout  Diabetes  Congestive Heart Failure  Asthma  S/P cholecystectomy  AICD (Automatic Cardioverter/Defibrillator) Present: inserted in Aug, 2008. Due for battery change in 1 month. ( [employer]) . Inserted by Dr Duffy    Allergies    Coreg (Other)  digoxin (Other; Short breath (Mild to Mod))  IV Contrast (Unknown)  penicillins (Hives)    Intolerances    metoprolol (Other)  Solu-Medrol (Other (Mild to Mod))    FAMILY HISTORY:  Family history of brain tumor    SH: Lives in same house as daughter: fully indeplendent; non smoker  REVIEW OF SYSTEMS  [  ] ROS unobtainable because:    [  ] All other systems negative except as noted below:	    Constitutional:  [ ] fever [ ] chills  [ ] weight loss  [ ] weakness  Skin:  [ ] rash [ ] phlebitis	  Eyes: [ ] icterus [ ] pain  [ ] discharge	  ENMT: [ ] sore throat  [ ] thrush [ ] ulcers [ ] exudates  Respiratory: [ ] dyspnea [ ] hemoptysis [ ] cough [ ] sputum	  Cardiovascular:  [ ] chest pain [ ] palpitations [ ] edema	  Gastrointestinal:  [ ] nausea [ ] vomiting [ ] diarrhea [ ] constipation [ ] pain	  Genitourinary:  [ ] dysuria [ ] frequency [ ] hematuria [ ] discharge [ ] flank pain  [ ] incontinence  Musculoskeletal:  [ ] myalgias [ ] arthralgias [ ] arthritis  [ ] back pain  Neurological:  [ ] headache [ ] seizures  [ ] confusion/altered mental status  Psychiatric:  [ ] anxiety [ ] depression	  Hematology/Lymphatics:  [ ] lymphadenopathy  Endocrine:  [ ] adrenal [ ] thyroid  Allergic/Immunologic:	 [ ] transplant [ ] seasonal      Medications:  MEDICATIONS  (STANDING):  ALBUTerol    90 MICROgram(s) HFA Inhaler 1 Puff(s) Inhalation every 4 hours  ALBUTerol/ipratropium for Nebulization 3 milliLiter(s) Nebulizer every 6 hours  atorvastatin 20 milliGRAM(s) Oral at bedtime  chlorhexidine 2% Cloths 1 Application(s) Topical daily  cholecalciferol 400 Unit(s) Oral daily  dextrose 50% Injectable 12.5 Gram(s) IV Push once  dextrose 50% Injectable 25 Gram(s) IV Push once  dextrose 50% Injectable 25 Gram(s) IV Push once  furosemide    Tablet 40 milliGRAM(s) Oral two times a day  insulin glargine Injectable (LANTUS) 6 Unit(s) SubCutaneous at bedtime  insulin lispro (HumaLOG) corrective regimen sliding scale   SubCutaneous every 6 hours  lactated ringers. 1000 milliLiter(s) (50 mL/Hr) IV Continuous <Continuous>  loratadine 10 milliGRAM(s) Oral daily  magnesium oxide 400 milliGRAM(s) Oral three times a day with meals  magnesium sulfate  IVPB 1 Gram(s) IV Intermittent once  meropenem  IVPB 1000 milliGRAM(s) IV Intermittent every 8 hours  montelukast 10 milliGRAM(s) Oral daily  pantoprazole    Tablet 40 milliGRAM(s) Oral before breakfast  phytonadione   Solution 10 milliGRAM(s) Oral daily  sodium phosphate IVPB 15 milliMole(s) IV Intermittent once  tiotropium 18 MICROgram(s) Capsule 1 Capsule(s) Inhalation daily  tiotropium 18 MICROgram(s) Capsule 1 Capsule(s) Inhalation daily  vancomycin  IVPB 1000 milliGRAM(s) IV Intermittent every 12 hours    MEDICATIONS  (PRN):  dextrose 40% Gel 15 Gram(s) Oral once PRN Blood Glucose LESS THAN 70 milliGRAM(s)/deciliter  glucagon  Injectable 1 milliGRAM(s) IntraMuscular once PRN Glucose LESS THAN 70 milligrams/deciliter  guaiFENesin    Syrup 100 milliGRAM(s) Oral every 6 hours PRN Cough    Vital Signs Last 24 Hrs  T(C): 37.4 (16 Aug 2019 06:08), Max: 37.4 (15 Aug 2019 17:43)  T(F): 99.4 (16 Aug 2019 06:08), Max: 99.4 (15 Aug 2019 17:43)  HR: 109 (16 Aug 2019 06:08) (105 - 109)  BP: 102/64 (16 Aug 2019 06:08) (96/60 - 138/72)  BP(mean): --  RR: 20 (16 Aug 2019 06:08) (18 - 22)  SpO2: 96% (16 Aug 2019 06:08) (95% - 98%)          08-15 @ 07:01  -  08-16 @ 07:00  --------------------------------------------------------  IN: 2090 mL / OUT: 560 mL / NET: 1530 mL      LABS:                        9.9    20.3  )-----------( 278      ( 16 Aug 2019 05:08 )             31.8     08-16    137  |  101  |  40<H>  ----------------------------<  127<H>  4.5   |  23  |  1.20    Ca    8.2<L>      16 Aug 2019 05:06  Phos  2.7     08-16  Mg     1.9     08-16    TPro  7.4  /  Alb  2.5<L>  /  TBili  0.6  /  DBili  x   /  AST  16  /  ALT  10  /  AlkPhos  131<H>  08-14      PT/INR - ( 16 Aug 2019 05:06 )   PT: 21.5 sec;   INR: 1.85 ratio         PTT - ( 16 Aug 2019 05:06 )  PTT:31.4 sec      CULTURES:  Culture Results:   Growth in aerobic bottle: Gram Positive Cocci in Clusters  "Due to technical problems, Proteus sp. will Not be reported as part of  the BCID panel until further notice"  ***Blood Panel PCR results on this specimen are available  approximately 3 hours after the Gram stain result.***  Gram stain, PCR, and/or culture results may not always  correspond due to difference in methodologies.  ************************************************************  This PCR assay was performed using [employer].  The following targets are tested for: Enterococcus,  vancomycin resistant enterococci, Listeria monocytogenes,  coagulase negative staphylococci, S. aureus,  methicillin resistant S. aureus, Streptococcus agalactiae  (Group B), S. pneumoniae, S.pyogenes (Group A),  Acinetobacter baumannii, Enterobacter cloacae, E. coli,  Klebsiella oxytoca, K. pneumoniae, Proteus sp.,  Serratia marcescens, Haemophilus influenzae,  Neisseria meningitidis, Pseudomonas aeruginosa, Candida  albicans, C. glabrata, C krusei, C parapsilosis,  C. tropicalis and the KPC resistance gene. (08-14 @ 17:44)  Culture Results:   No growth to date. (08-14 @ 17:44)    Physical Examination:    General: Non toxic, No acute distress.      HEENT: Pupils equal, reactive to light.  Symmetric.    PULM: Scattered exp rhonchi, no wheeze, good aeration    CVS: Regular rate and rhythm, no murmurs, rubs, or gallops    ABD: Soft, mild diffuse tenderness; ostomly and drains noted    EXT: No edema, nontender    SKIN: Warm and well perfused, no rashes noted.    NEURO: Alert, oriented, interactive, nonfocal    RADIOLOGY REVIEWED PERSONALLY  CXR:    PROCEDURE DATE:  08/16/2019        INTERPRETATION:  CLINICAL INFORMATION: Abnormal lung sounds and wheezing.    TECHNIQUE: Portable AP radiograph of the chest.    COMPARISON: Portable AP radiograph of the chest 8/14/2019. CT abdomen   pelvis 8/14/2019    FINDINGS:    The lungs are clear. No pleural effusion or pneumothorax.    Cardiomediastinal silhouette is normal. Left chest wall 3-lead AICD.    Unremarkable skeletal structures.      IMPRESSION:     Clear lungs        CT chest:    TTE:

## 2019-08-16 NOTE — PHYSICAL THERAPY INITIAL EVALUATION ADULT - ASSISTIVE DEVICE FOR TRANSFER: SIT/STAND, REHAB EVAL
assist in front. Attempted sit to stand with rolling walker and max assist of 2, pt unable to complete activity

## 2019-08-16 NOTE — ED ADULT TRIAGE NOTE - BMI (KG/M2)
OB Delivery Note





- Delivery


Date of Delivery: 19 ( male)


Assistant: SARAHI LOREDO


Estimated blood loss: 300cc





- Vaginal


Delivery presentation: vertex


Delivery position: OA


Intrapartum events: none


Delivery induction: oxytocin


Delivery augmentation: rupture of membranes


Delivery monitor: internal FHT, internal uterine


Route of delivery: 


Delivery placenta: spontaneous


Delivery cord: nuchal cord, 3 umbilical vessels


Episiotomy: none


Delivery laceration: none


Anesthesia: epidural


Delivery comments: 


 male infant del over intact perineum, placed skin to skin. 3 vessel cord 

clamped and cut after cessation of pulsation. cord blood collected. Placenta del

intact and complete. Pit to IVF. sent to path d/t IDDM. no laceration to repair 

. , apgars 8/9, wt 7#7oz. couplet remains LDR stable. 








- Infant


  ** A


Apgar at 1 minute: 8


Apgar at 5 minutes: 9


Infant Gender: Male (7#7) 33.8

## 2019-08-16 NOTE — PHYSICAL THERAPY INITIAL EVALUATION ADULT - ADDITIONAL COMMENTS
lives w/ lives w/ dtr in private home, dtr lives upstairs, pt uses cane /and or rollator if pt's knees are bothering her, dtr reports pt uses w/c for long distances, pt has 4 steps to enter home in back door and then on one level. Pt was hospitalized for most of lives w/ dtr in private home, dtr lives upstairs, pt uses cane /and or rollator if pt's knees are bothering her, dtr reports pt uses w/c for long distances, pt has 4 steps to enter home in back door and then on one level. As per phone conversation with pt's daughter Yeimy, Pt with recent hospitalization from 6/30-8/2. As per last PT note 8/1, pt required mod assist for sit to stand with therapist in front, ambulated 2-3 steps from chair to bed. As per phone conversation with pt's daughter Yeimy, pt was not ambulating in rehab. As per daughter, pt's has had a pressure ulcer for many weeks. Pt lives alone on 1st floor of 2 family house, pt's daughter lives upstairs. 4 steps to enter. Pt owns rollator and wheelchair. As per phone conversation with pt's daughter Yeimy, Pt with recent hospitalization from 6/30-8/2. As per last PT note 8/1, pt required mod assist for sit to stand with therapist in front, ambulated 2-3 steps from chair to bed. As per phone conversation with pt's daughter Yeimy, pt was not ambulating in rehab.  Pt lives alone on 1st floor of 2 family house, pt's daughter lives upstairs. 4 steps to enter. Pt owns rollator and wheelchair.

## 2019-08-16 NOTE — PROGRESS NOTE ADULT - SUBJECTIVE AND OBJECTIVE BOX
Interval Events: No acute interval events    S: Patient states she is still having abdominal pain and remains mildly tachycardic. She denies fevers, chills, nausea, emesis, chest pain, SOB.    O: Vital Signs  T(C): 36.7 (08-16 @ 01:02), Max: 37.4 (08-15 @ 17:43)  HR: 109 (08-16 @ 02:55) (93 - 109)  BP: 108/68 (08-16 @ 02:55) (96/60 - 138/72)  RR: 22 (08-16 @ 02:55) (18 - 22)  SpO2: 95% (08-16 @ 02:55) (95% - 99%)  08-14-19 @ 07:01  -  08-15-19 @ 07:00  --------------------------------------------------------  IN: 500 mL / OUT: 150 mL / NET: 350 mL    08-15-19 @ 07:01  -  08-16-19 @ 04:51  --------------------------------------------------------  IN: 1190 mL / OUT: 560 mL / NET: 630 mL      General: alert and oriented, NAD  Resp: airway patent, respirations unlabored  CVS: regular rate and rhythm  Abdomen: soft, mildly tender, nondistended. Ostomy in place with scant output. Strattice in place over abdominal wound  Extremities: no edema  Skin: warm, dry, appropriate color                          9.1    18.9  )-----------( 208      ( 15 Aug 2019 05:30 )             32.4   08-15    140  |  104  |  40<H>  ----------------------------<  169<H>  4.7   |  19<L>  |  0.83    Ca    8.5      15 Aug 2019 05:30  Phos  4.9     08-15  Mg     1.9     08-15    TPro  7.4  /  Alb  2.5<L>  /  TBili  0.6  /  DBili  x   /  AST  16  /  ALT  10  /  AlkPhos  131<H>  08-14

## 2019-08-16 NOTE — PROVIDER CONTACT NOTE (OTHER) - ASSESSMENT
Respirations 22. All other VSS. LR running at 100 cc per hour as ordered. Pt has history of a flutter, Asthma and CHF. Has been voiding minimally. Pt states she is not in pain but feels like she needs to cough up sputum and cannot. Concerned about possible fluid overload. Pt has not been receiving PO lasix as ordered due to SBP being less than 120.

## 2019-08-16 NOTE — PROGRESS NOTE ADULT - SUBJECTIVE AND OBJECTIVE BOX
Interventional Radiology Pre-Procedure Note    This is a 74y Female with Pmh of CHF, s/p AICD (interrogation report in chart from 7/12/19), asthma, HTN, HLD, atrial fibrillation on eliquis, perforated diverticulosis s/p Delgado's procedure with multiple RTOR for abd washouts who presented with c/o left sided abd pain and "cellulitis" to left abdomen. CT abd/Pelvis w/ IV Cont (08.14.19) demonstrated "New left lower ventral abdominal wall air-containing fluid collection with intraperitoneal communication to the level of the sigmoid colon stump." IR requested for drainage.     NPO:  Anesthesia:  Anticoagulation:  Antibiotics: IV vancomycin 1g q 12hr last dose @0613, IV Meropenem 1g q8hr last dose at 0611    PAST MEDICAL & SURGICAL HISTORY:  Refusal of blood transfusions as patient is Rastafarian  Patient is Rastafarian  Vertigo  Atrial flutter  Diverticulitis  Cardiomyopathy  Kidney stone  Cardiac Pacemaker  HTN - Hypertension  Gout  Diabetes  Congestive Heart Failure  Asthma  S/P cholecystectomy  AICD (Automatic Cardioverter/Defibrillator) Present: inserted in Aug, 2008. Due for battery change in 1 month. ( [employer]) . Inserted by Dr Duffy     Vital Signs Last 24 Hrs  T(C): 37.4 (16 Aug 2019 06:08), Max: 37.4 (15 Aug 2019 17:43)  T(F): 99.4 (16 Aug 2019 06:08), Max: 99.4 (15 Aug 2019 17:43)  HR: 109 (16 Aug 2019 06:08) (99 - 109)  BP: 102/64 (16 Aug 2019 06:08) (96/60 - 138/72)  RR: 20 (16 Aug 2019 06:08) (18 - 22)  SpO2: 96% (16 Aug 2019 06:08) (95% - 99%)    Allergies: Coreg (Other)  digoxin (Other; Short breath (Mild to Mod))  IV Contrast (Unknown)  penicillins (Hives)    Intolerances:metoprolol (Other)  Solu-Medrol (Other (Mild to Mod))      Physical Exam: Gen:    Labs:                         9.9    20.3  )-----------( 278      ( 16 Aug 2019 05:08 )             31.8     08-16    137  |  101  |  40<H>  ----------------------------<  127<H>  4.5   |  23  |  1.20    Ca    8.2<L>      16 Aug 2019 05:06  Phos  2.7     08-16  Mg     1.9     08-16    TPro  7.4  /  Alb  2.5<L>  /  TBili  0.6  /  DBili  x   /  AST  16  /  ALT  10  /  AlkPhos  131<H>  08-14    PT/INR - ( 16 Aug 2019 05:06 )   PT: 21.5 sec;   INR: 1.85 ratio         PTT - ( 16 Aug 2019 05:06 )  PTT:31.4 sec    Plan is for abdominal fluid collection drainage. Informed consent obtained. All questions and concerns have been addressed at this time. Interventional Radiology Pre-Procedure Note    This is a 74y Female with Pmh of CHF, s/p AICD (interrogation report in chart from 7/12/19), asthma, HTN, HLD, atrial fibrillation on eliquis, perforated diverticulosis s/p Delgado's procedure with multiple RTOR for abd washouts who presented with c/o left sided abd pain and "cellulitis" to left abdomen. CT abd/Pelvis w/ IV Cont (08.14.19) demonstrated a right subcutaneous collection, left abdominal wall collection and a left intraabdominal collection. IR requested for drainage.     NPO: midnight   Anesthesia: denies adverse events  Anticoagulation: Eliquis last dose Wed morning (8/14)  Antibiotics: IV vancomycin 1g q 12hr last dose @0613, IV Meropenem 1g q8hr last dose at 0611  Patient refusing blood products as she is a Congregational     PAST MEDICAL & SURGICAL HISTORY:  Refusal of blood transfusions as patient is Restorationist  Patient is Restorationist  Vertigo  Atrial flutter  Diverticulitis  Cardiomyopathy  Kidney stone  Cardiac Pacemaker  HTN - Hypertension  Gout  Diabetes  Congestive Heart Failure  Asthma  S/P cholecystectomy  AICD (Automatic Cardioverter/Defibrillator) Present: inserted in Aug, 2008. Due for battery change in 1 month. ( [employer]) . Inserted by Dr Duffy     Vital Signs Last 24 Hrs  T(C): 37.4 (16 Aug 2019 06:08), Max: 37.4 (15 Aug 2019 17:43)  T(F): 99.4 (16 Aug 2019 06:08), Max: 99.4 (15 Aug 2019 17:43)  HR: 109 (16 Aug 2019 06:08) (99 - 109)  BP: 102/64 (16 Aug 2019 06:08) (96/60 - 138/72)  RR: 20 (16 Aug 2019 06:08) (18 - 22)  SpO2: 96% (16 Aug 2019 06:08) (95% - 99%)    Allergies: Coreg (Other)  digoxin (Other; Short breath (Mild to Mod))  IV Contrast (Unknown)  penicillins (Hives)    Intolerances: metoprolol (Other)  Solu-Medrol (Other (Mild to Mod))      Physical Exam: Gen: NAD, A&ox4    Labs:                         9.9    20.3  )-----------( 278      ( 16 Aug 2019 05:08 )             31.8     08-16    137  |  101  |  40<H>  ----------------------------<  127<H>  4.5   |  23  |  1.20    Ca    8.2<L>      16 Aug 2019 05:06  Phos  2.7     08-16  Mg     1.9     08-16    TPro  7.4  /  Alb  2.5<L>  /  TBili  0.6  /  DBili  x   /  AST  16  /  ALT  10  /  AlkPhos  131<H>  08-14    PT/INR - ( 16 Aug 2019 05:06 )   PT: 21.5 sec;   INR: 1.85 ratio         PTT - ( 16 Aug 2019 05:06 )  PTT:31.4 sec    Plan is for drainage of abdominal fluid collections. The procedure/ risks/ benefits/ alternatives were discussed with the pt and Informed consent obtained. All questions and concerns have been addressed at this time.

## 2019-08-16 NOTE — CONSULT NOTE ADULT - ASSESSMENT
COPD with chronic persistent asthma: patient appears stable at this time with normal RA sats (96%) and comfortable breathing supine. She has scattered rhonchi on exam, yet no wheezing and good aeration. CXR is clear.  Moderate MS and Mild AS on recent TTE and moderate systolic CHF: patient does not appear to be in decompensated CHF at this time; there is no evidence of pulmonary edema radiographically or on physical exam, although cannot exclude interstitial edema. Mild tachycardia likely secondary to intrabdominal infection, albuterol HFA could contribute. Of note, patient is currently on bid lasix and LR at 50 /hour    REC    Continue duoneb qid: DC proventil  Add symbicort 80/ bid  Would avoid prednisone in this setting!  Consider holding lasix in setting of sepsis - monitor for fluid overload  Monitor O2 sats COPD with chronic persistent asthma: patient appears stable at this time with normal RA sats (96%) and comfortable breathing supine. She has scattered rhonchi on exam, yet no wheezing and good aeration. CXR is clear.  Moderate MS and Mild AS on recent TTE and moderate systolic CHF: patient does not appear to be in decompensated CHF at this time; there is no evidence of pulmonary edema radiographically or on physical exam, although cannot exclude interstitial edema. Mild tachycardia likely secondary to intrabdominal infection, albuterol HFA could contribute. Of note, patient is currently on bid lasix and LR at 50 /hour    REC    Continue duoneb qid: DC proventil  Add symbicort 80/ bid  Would avoid prednisone in this setting!  Consider holding lasix in setting of sepsis - increase creatinine today  Monitor O2 sats

## 2019-08-16 NOTE — PROGRESS NOTE ADULT - ASSESSMENT
74 year old female PMH of HFrEF, s/p AICD, asthma, HTN, HLD, atrial fibrillation on eliquis, with previous perforated diverticulosis, Hartmans procedure with multiple RTOR for abd washouts. Admitted 6/30 - 8/2 for feculent peritonitis with septic shock secondary to perforated diverticulitis with hospital course c/b HCAP 2/2 CR Pseudomonas. In the ED afebrile, tachycardic to > 120 and hypotensive to systolics in the 90's. WBC on presentation of 19.8 with 72% PMN. Lactic acid of 2.5. CXR clear. CT A/P with new left lower ventral abdominal wall air-containing fluid collection with intraperitoneal communication to the level of the sigmoid colon stump and Additional enlargement of the left paracolic gutter collection.     Overall, Intraabdominal abscess with possible sigmoid colon stump dehiscence. Agree with Vancomycin and Meropenem at this point. Followup on IR drainage cultures. Would recommend eventual repeat CT A/P PO contrast study to evaluate for extravasation at the colon stump.     #Intraabdominal Abscesses  --Continue Meropenem 1g IV 8H  --Continue Vancomycin 1g IV Q12H. Trough prior to next dose (ordered)  --Follow up on IR drainage cultures  --Recommend eventual CT A/P with PO contrast study to evaluate   --Follow up on prelim BCx    #CoNS on BCx - likely procurement contaminant   --F/U Repeat BCx    #Leukocytosis - related to intraabdominal abscess  --Continue antibiotics as above  --Continue to trend WBC    I will continue to follow. Please feel free to contact me with any further questions.    Rex Goode M.D.  [facility] Division of Infectious Disease  8AM-5PM: Pager Number [phone]  After Hours (or if no response): Please contact the Infectious Diseases Office at [phone]

## 2019-08-16 NOTE — PROGRESS NOTE ADULT - SUBJECTIVE AND OBJECTIVE BOX
Vascular & Interventional Radiology Pre-Procedure Note    Procedure Name: Abdominal fluid collection aspiration/drainage    HPI: 74y Female with history of diverticulitis status post gautam's now with abdominal wall and abdominal fluid collections.     Allergies: Coreg (Other)  digoxin (Other; Short breath (Mild to Mod))  IV Contrast (Unknown)  penicillins (Hives)    Medications (Abx/Cardiac/Anticoagulation/Blood Products)    meropenem  IVPB: 100 mL/Hr IV Intermittent (08-16 @ 06:11)  vancomycin  IVPB: 250 mL/Hr IV Intermittent (08-16 @ 06:13)    Data:    T(C): 37.4  HR: 109  BP: 102/64  RR: 20  SpO2: 96%    -WBC 20.3 / HgB 9.9 / Hct 31.8 / Plt 278  -Na 137 / Cl 101 / BUN 40 / Glucose 127  -K 4.5 / CO2 23 / Cr 1.20  -ALT -- / Alk Phos -- / T.Bili --  -INR1.85    Imaging: CT reviewed from 8/14/2019    Plan:   -74y Female presents for abdominal wall and abdominal fluid collection aspiration and drainage. Procedure and risk of bleeding discussed with patient who is agreeable.   -Risks/Benefits/alternatives explained with the patient and/or healthcare proxy and witnessed informed consent obtained.

## 2019-08-16 NOTE — CHART NOTE - NSCHARTNOTEFT_GEN_A_CORE
Surgery Post op Note    Procedure: Left Lower Quadrant Drain Placement x 2 and Right Abdominal Wall Aspiration     SUBJECTIVE: Patient seen and evaluated at the bedside. Patient getting a respiratory treatment upon check. Patient states that her pain is better tolerated. She denies chest pain, shortness of breath, dizziness and headache.   SOB:  [ ] YES [x ] NO  Chest Discomfort: [ ] YES [ x] NO    Nausea: [ ] YES [x ] NO           Vomiting: [ ] YES [x ] NO  Void: [x ]YES [ ]No  Primafit        Pain Control Adequate: [X ] YES [ ] NO    Objective:   Vital Signs Last 24 Hrs  T(C): 36.8 (16 Aug 2019 16:37), Max: 37.4 (15 Aug 2019 17:43)  T(F): 98.3 (16 Aug 2019 16:37), Max: 99.4 (15 Aug 2019 17:43)  HR: 95 (16 Aug 2019 16:37) (95 - 109)  BP: 109/68 (16 Aug 2019 16:37) (96/60 - 118/82)  BP(mean): --  RR: 20 (16 Aug 2019 16:37) (18 - 22)  SpO2: 95% (16 Aug 2019 16:37) (95% - 98%)  I&O's Summary    15 Aug 2019 07:01  -  16 Aug 2019 07:00  --------------------------------------------------------  IN: 2090 mL / OUT: 560 mL / NET: 1530 mL    16 Aug 2019 07:01  -  16 Aug 2019 17:40  --------------------------------------------------------  IN: 0 mL / OUT: 35 mL / NET: -35 mL      I&O's Detail    15 Aug 2019 07:01  -  16 Aug 2019 07:00  --------------------------------------------------------  IN:    lactated ringers.: 600 mL    Oral Fluid: 1140 mL    Solution: 250 mL    Solution: 100 mL  Total IN: 2090 mL    OUT:    Colostomy: 60 mL    Voided: 500 mL  Total OUT: 560 mL    Total NET: 1530 mL      16 Aug 2019 07:01  -  16 Aug 2019 17:40  --------------------------------------------------------  IN:  Total IN: 0 mL    OUT:    Bulb: 15 mL    Bulb: 20 mL  Total OUT: 35 mL    Total NET: -35 mL            LABS:                        9.9    20.3  )-----------( 278      ( 16 Aug 2019 05:08 )             31.8     08-16    137  |  101  |  40<H>  ----------------------------<  127<H>  4.5   |  23  |  1.20    Ca    8.2<L>      16 Aug 2019 05:06  Phos  2.7     08-16  Mg     1.9     08-16      PT/INR - ( 16 Aug 2019 05:06 )   PT: 21.5 sec;   INR: 1.85 ratio         PTT - ( 16 Aug 2019 05:06 )  PTT:31.4 sec      MEDICATIONS  (STANDING):  ALBUTerol/ipratropium for Nebulization 3 milliLiter(s) Nebulizer every 6 hours  atorvastatin 20 milliGRAM(s) Oral at bedtime  buDESOnide  80 MICROgram(s)/formoterol 4.5 MICROgram(s) Inhaler 2 Puff(s) Inhalation two times a day  chlorhexidine 2% Cloths 1 Application(s) Topical daily  cholecalciferol 400 Unit(s) Oral daily  dextrose 50% Injectable 12.5 Gram(s) IV Push once  dextrose 50% Injectable 25 Gram(s) IV Push once  dextrose 50% Injectable 25 Gram(s) IV Push once  heparin  Injectable 5000 Unit(s) SubCutaneous every 8 hours  insulin glargine Injectable (LANTUS) 6 Unit(s) SubCutaneous at bedtime  insulin lispro (HumaLOG) corrective regimen sliding scale   SubCutaneous every 6 hours  lactated ringers. 1000 milliLiter(s) (50 mL/Hr) IV Continuous <Continuous>  loratadine 10 milliGRAM(s) Oral daily  magnesium oxide 400 milliGRAM(s) Oral three times a day with meals  meropenem  IVPB 1000 milliGRAM(s) IV Intermittent every 8 hours  montelukast 10 milliGRAM(s) Oral daily  pantoprazole    Tablet 40 milliGRAM(s) Oral before breakfast  phytonadione   Solution 10 milliGRAM(s) Oral daily  tiotropium 18 MICROgram(s) Capsule 1 Capsule(s) Inhalation daily  tiotropium 18 MICROgram(s) Capsule 1 Capsule(s) Inhalation daily  vancomycin  IVPB 1000 milliGRAM(s) IV Intermittent every 12 hours    MEDICATIONS  (PRN):  dextrose 40% Gel 15 Gram(s) Oral once PRN Blood Glucose LESS THAN 70 milliGRAM(s)/deciliter  glucagon  Injectable 1 milliGRAM(s) IntraMuscular once PRN Glucose LESS THAN 70 milligrams/deciliter  guaiFENesin    Syrup 100 milliGRAM(s) Oral every 6 hours PRN Cough      Physical exam  General: NAD, alert, awake   Respiratory: non labored breathing, receiving respiratory treatment  Abdomen: ostomy with small amount of liquid stool, soft, obese, tenderness to palpation LLQ near IR drain insertion site, 2 BASIM LLQ drains with brown output     Assessment/Plan: 74y Female with perforated diverticulosis now s/p IR drain placement and aspiration.    - Diet: Diabetic Diet   - Activity- OOB with assistance  - Labs: AM CBC   - Pain medication as needed   - DVT ppx resumed tonight   - incentive spirometry   - c/w ABX as recommended by KIMBERLY Riggins PA-C   p [phone]

## 2019-08-16 NOTE — PHYSICAL THERAPY INITIAL EVALUATION ADULT - PLANNED THERAPY INTERVENTIONS, PT EVAL
balance training/stair training- GOAL: 4 steps with rail and mod assist, 4 wks/bed mobility training/gait training/transfer training

## 2019-08-16 NOTE — PROVIDER CONTACT NOTE (OTHER) - ASSESSMENT
BP 96/60. All other VSS. pt minimally voiding, NPO after midnight with LR running at 50 cc per hour as ordered.  BP 96/60. All other VSS. pt minimally voiding, NPO after midnight with LR running at 50 cc per hour as ordered. Pt resting in bed. Denies pain.

## 2019-08-16 NOTE — PHYSICAL THERAPY INITIAL EVALUATION ADULT - PERTINENT HX OF CURRENT PROBLEM, REHAB EVAL
73 y/o F with Pmhx of HFrEF, s/p AICD, asthma, HTN, HLD, atrial fibrillation on eliquis, with previous perforated diverticulosis, Hartmans procedure with multiple RTOR for abd washouts, closure of abdominal incision with mesh (7/11/19), returns with abd pain found to have LLQ abdominal wall collection and intraabdominal fluid collection 75 y/o F admitted to [facility] on 8/15/19  with Pmhx of HFrEF, s/p AICD, asthma, HTN, HLD, atrial fibrillation on eliquis, with previous perforated diverticulosis, Hartmans procedure with multiple RTOR for abd washouts, closure of abdominal incision with mesh (7/11/19), returns with abd pain found to have LLQ abdominal wall collection and intraabdominal fluid collection 73 y/o F admitted to [facility] on 8/15/19  with Pmhx of HFrEF, s/p AICD, asthma, HTN, HLD, atrial fibrillation on eliquis, with previous perforated diverticulosis, Hartmans procedure with multiple RTOR for abd washouts, closure of abdominal incision with mesh (7/11/19), returns with abd pain found to have LLQ abdominal wall collection and intraabdominal fluid collection, + drain placed for fluid collection in abdomen 73 y/o F admitted to [facility] on 8/15/19  with Pmhx of HFrEF, s/p AICD, asthma, HTN, HLD, atrial fibrillation on eliquis, with previous perforated diverticulosis, Hartmans procedure with multiple RTOR for abd washouts, closure of abdominal incision with mesh (7/11/19), returns with abd pain found to have LLQ abdominal wall collection and intraabdominal fluid collection, + drain placed for fluid collection in abdomen. Recent hospitalization 6/30-8/2.

## 2019-08-16 NOTE — PROGRESS NOTE ADULT - ATTENDING COMMENTS
I have seen and evaluated the patient and discussed the relevant clinical findings and plan with the surgical housestaff and fellow.  Agree with the above documentation with addenda as noted.     Clinically stable  C/o abdominal pain  Abdomen is soft, tender to palpation left abdomen  Wound is granulating    Plan for IR drainage today  Cont abx as per KIMBERLY Alvarado MD

## 2019-08-16 NOTE — PROGRESS NOTE ADULT - SUBJECTIVE AND OBJECTIVE BOX
Interventional Radiology Brief- Operative Note    Procedure: Left lower quadrant drain placement x2 and right abdominal wall aspiration.     Operators: Luis Miguel Walker MD    Anesthesia (type): Monitored anesthesia care.     Contrast: None.     EBL: Minimal.    Findings/Follow up Plan of Care: Successful aspiration of 7 mL of brown fluid from right anterior abdominal wall collections. Successful 10 Tanzanian pigtail catheter placement in left lower quadrant anterior abdominal wall collection and 10 Tanzanian pigtail in left retroperitoneal fluid collection. Brown thick fluid aspirated from both of these collections. Both drains placed to suction drainage.     Specimens Removed: Samples were sent from each collection for laboratory testing.     Implants: Two 10 Tanzanian left lower quadrant drains were placed.     Complications: No immediate complications.     Condition/Disposition: To recovery area for 2 hours. Record output from tubes.     Please call Interventional Radiology x [phone] with any questions, concerns, or issues.

## 2019-08-16 NOTE — PROGRESS NOTE ADULT - ASSESSMENT
ASSESSMENT  74y F with recent history of ex lap, Lupe's procedure for perforated diverticulitis with sepsis.  She had multiple abdominal washouts and abdomen closed with strattice. She is here with abdominal pain    Plan:   - Abx - vancomycin and meropenem  - NPO with gentle IVF  - IR- drainage of both LLQ and intraabdominal fluid collections today  - Wound care consultation    Green team  [phone]

## 2019-08-17 LAB
ANION GAP SERPL CALC-SCNC: 10 MMOL/L — SIGNIFICANT CHANGE UP (ref 5–17)
BUN SERPL-MCNC: 26 MG/DL — HIGH (ref 7–23)
CALCIUM SERPL-MCNC: 7.8 MG/DL — LOW (ref 8.4–10.5)
CHLORIDE SERPL-SCNC: 108 MMOL/L — SIGNIFICANT CHANGE UP (ref 96–108)
CO2 SERPL-SCNC: 22 MMOL/L — SIGNIFICANT CHANGE UP (ref 22–31)
CREAT SERPL-MCNC: 1.01 MG/DL — SIGNIFICANT CHANGE UP (ref 0.5–1.3)
GLUCOSE SERPL-MCNC: 127 MG/DL — HIGH (ref 70–99)
HCT VFR BLD CALC: 26.2 % — LOW (ref 34.5–45)
HGB BLD-MCNC: 8.3 G/DL — LOW (ref 11.5–15.5)
MCHC RBC-ENTMCNC: 31.2 PG — SIGNIFICANT CHANGE UP (ref 27–34)
MCHC RBC-ENTMCNC: 31.7 GM/DL — LOW (ref 32–36)
MCV RBC AUTO: 98.5 FL — SIGNIFICANT CHANGE UP (ref 80–100)
PLATELET # BLD AUTO: 246 K/UL — SIGNIFICANT CHANGE UP (ref 150–400)
POTASSIUM SERPL-MCNC: 4.1 MMOL/L — SIGNIFICANT CHANGE UP (ref 3.5–5.3)
POTASSIUM SERPL-SCNC: 4.1 MMOL/L — SIGNIFICANT CHANGE UP (ref 3.5–5.3)
RBC # BLD: 2.66 M/UL — LOW (ref 3.8–5.2)
RBC # FLD: 20.9 % — HIGH (ref 10.3–14.5)
SODIUM SERPL-SCNC: 140 MMOL/L — SIGNIFICANT CHANGE UP (ref 135–145)
VANCOMYCIN TROUGH SERPL-MCNC: 15.7 UG/ML — SIGNIFICANT CHANGE UP (ref 10–20)
WBC # BLD: 12.46 K/UL — HIGH (ref 3.8–10.5)
WBC # FLD AUTO: 12.46 K/UL — HIGH (ref 3.8–10.5)

## 2019-08-17 PROCEDURE — 99024 POSTOP FOLLOW-UP VISIT: CPT

## 2019-08-17 RX ORDER — TETRACAINE/BENZOCAINE/BUTAMBEN 2%-14%-2%
1 OINTMENT (GRAM) TOPICAL ONCE
Refills: 0 | Status: DISCONTINUED | OUTPATIENT
Start: 2019-08-17 | End: 2019-08-27

## 2019-08-17 RX ORDER — VANCOMYCIN HCL 1 G
750 VIAL (EA) INTRAVENOUS ONCE
Refills: 0 | Status: COMPLETED | OUTPATIENT
Start: 2019-08-17 | End: 2019-08-17

## 2019-08-17 RX ORDER — ACETAMINOPHEN 500 MG
650 TABLET ORAL ONCE
Refills: 0 | Status: COMPLETED | OUTPATIENT
Start: 2019-08-17 | End: 2019-08-17

## 2019-08-17 RX ORDER — VANCOMYCIN HCL 1 G
VIAL (EA) INTRAVENOUS
Refills: 0 | Status: DISCONTINUED | OUTPATIENT
Start: 2019-08-17 | End: 2019-08-17

## 2019-08-17 RX ORDER — VANCOMYCIN HCL 1 G
VIAL (EA) INTRAVENOUS
Refills: 0 | Status: DISCONTINUED | OUTPATIENT
Start: 2019-08-17 | End: 2019-08-19

## 2019-08-17 RX ORDER — VANCOMYCIN HCL 1 G
750 VIAL (EA) INTRAVENOUS EVERY 12 HOURS
Refills: 0 | Status: DISCONTINUED | OUTPATIENT
Start: 2019-08-18 | End: 2019-08-19

## 2019-08-17 RX ADMIN — Medication 400 UNIT(S): at 11:36

## 2019-08-17 RX ADMIN — MAGNESIUM OXIDE 400 MG ORAL TABLET 400 MILLIGRAM(S): 241.3 TABLET ORAL at 18:00

## 2019-08-17 RX ADMIN — HEPARIN SODIUM 5000 UNIT(S): 5000 INJECTION INTRAVENOUS; SUBCUTANEOUS at 15:31

## 2019-08-17 RX ADMIN — MEROPENEM 100 MILLIGRAM(S): 1 INJECTION INTRAVENOUS at 15:30

## 2019-08-17 RX ADMIN — Medication 650 MILLIGRAM(S): at 20:56

## 2019-08-17 RX ADMIN — INSULIN GLARGINE 6 UNIT(S): 100 INJECTION, SOLUTION SUBCUTANEOUS at 21:30

## 2019-08-17 RX ADMIN — Medication 3 MILLILITER(S): at 17:58

## 2019-08-17 RX ADMIN — TIOTROPIUM BROMIDE 1 CAPSULE(S): 18 CAPSULE ORAL; RESPIRATORY (INHALATION) at 20:55

## 2019-08-17 RX ADMIN — Medication 650 MILLIGRAM(S): at 21:21

## 2019-08-17 RX ADMIN — BUDESONIDE AND FORMOTEROL FUMARATE DIHYDRATE 2 PUFF(S): 160; 4.5 AEROSOL RESPIRATORY (INHALATION) at 05:21

## 2019-08-17 RX ADMIN — SODIUM CHLORIDE 50 MILLILITER(S): 9 INJECTION, SOLUTION INTRAVENOUS at 00:31

## 2019-08-17 RX ADMIN — MONTELUKAST 10 MILLIGRAM(S): 4 TABLET, CHEWABLE ORAL at 11:37

## 2019-08-17 RX ADMIN — MEROPENEM 100 MILLIGRAM(S): 1 INJECTION INTRAVENOUS at 20:55

## 2019-08-17 RX ADMIN — CHLORHEXIDINE GLUCONATE 1 APPLICATION(S): 213 SOLUTION TOPICAL at 11:36

## 2019-08-17 RX ADMIN — Medication 3 MILLILITER(S): at 00:31

## 2019-08-17 RX ADMIN — Medication 250 MILLIGRAM(S): at 18:45

## 2019-08-17 RX ADMIN — HEPARIN SODIUM 5000 UNIT(S): 5000 INJECTION INTRAVENOUS; SUBCUTANEOUS at 20:56

## 2019-08-17 RX ADMIN — HEPARIN SODIUM 5000 UNIT(S): 5000 INJECTION INTRAVENOUS; SUBCUTANEOUS at 05:22

## 2019-08-17 RX ADMIN — BUDESONIDE AND FORMOTEROL FUMARATE DIHYDRATE 2 PUFF(S): 160; 4.5 AEROSOL RESPIRATORY (INHALATION) at 17:58

## 2019-08-17 RX ADMIN — MAGNESIUM OXIDE 400 MG ORAL TABLET 400 MILLIGRAM(S): 241.3 TABLET ORAL at 11:34

## 2019-08-17 RX ADMIN — PANTOPRAZOLE SODIUM 40 MILLIGRAM(S): 20 TABLET, DELAYED RELEASE ORAL at 05:22

## 2019-08-17 RX ADMIN — Medication 3 MILLILITER(S): at 11:35

## 2019-08-17 RX ADMIN — Medication 3 MILLILITER(S): at 05:22

## 2019-08-17 RX ADMIN — MEROPENEM 100 MILLIGRAM(S): 1 INJECTION INTRAVENOUS at 05:22

## 2019-08-17 RX ADMIN — LORATADINE 10 MILLIGRAM(S): 10 TABLET ORAL at 11:37

## 2019-08-17 RX ADMIN — ATORVASTATIN CALCIUM 20 MILLIGRAM(S): 80 TABLET, FILM COATED ORAL at 20:55

## 2019-08-17 NOTE — PROGRESS NOTE ADULT - SUBJECTIVE AND OBJECTIVE BOX
GENERAL SURGERY DAILY PROGRESS NOTE:    Interval:  Underwent Left lower quadrant drain placement x2 (10Fr) and right abdominal wall aspiration.    Subjective:  Patient reports pain is well controlled. Denies N/V.     Vital Signs Last 24 Hrs  T(C): 37.2 (17 Aug 2019 00:31), Max: 37.4 (16 Aug 2019 06:08)  T(F): 98.9 (17 Aug 2019 00:31), Max: 99.4 (16 Aug 2019 06:08)  HR: 110 (17 Aug 2019 00:31) (95 - 116)  BP: 115/74 (17 Aug 2019 00:31) (99/65 - 115/74)  BP(mean): --  RR: 20 (17 Aug 2019 00:31) (20 - 20)  SpO2: 97% (17 Aug 2019 00:31) (95% - 97%)    Exam:  Gen: NAD, resting in bed, alert and responding appropriately  Resp: Airway patent, non-labored respirations  Abd: soft, mildly tender, nondistended. Ostomy in place with scant output. Strattice in place  Ext: No edema, WWP  Neuro: AAOx3, no focal deficits    I&O's Detail      16 Aug 2019 07:01  -  17 Aug 2019 02:55  --------------------------------------------------------  Total IN: 450 mL  OUT:    Bulb: 35 mL    Bulb: 55 mL  Total OUT: 90 mL    Total NET: 360 mL    LABS:                        9.9    20.3  )-----------( 278      ( 16 Aug 2019 05:08 )             31.8     08-16    137  |  101  |  40<H>  ----------------------------<  127<H>  4.5   |  23  |  1.20    Ca    8.2<L>      16 Aug 2019 05:06  Phos  2.7     08-16  Mg     1.9     08-16    IR:  Successful aspiration of 7 mL of brown fluid from right anterior abdominal wall collections. Successful 10 Occitan pigtail catheter placement in left lower quadrant anterior abdominal wall collection and 10 Occitan pigtail in left retroperitoneal fluid collection. Brown thick fluid aspirated from both of these collections. Both drains placed to suction drainage.

## 2019-08-17 NOTE — PROGRESS NOTE ADULT - ASSESSMENT
ASSESSMENT  74y F with recent history of ex lap, Lupe's procedure for perforated diverticulitis with sepsis.  She had multiple abdominal washouts and abdomen closed with strattice. She is here with abdominal pain    Plan:   - ID: Abx - Meropenem. Vancomycin held due to high vanc trough levels, await re-dosing  - CLD  - F/u cultures from abdominal wall collections  - Wound care consultation    Green team  [phone]

## 2019-08-17 NOTE — PROGRESS NOTE ADULT - ATTENDING COMMENTS
I saw and examined the pt and discussed the tx plan with the House Staff. I agree with the exam and plan as documented in the surgery resident's note from today.  Left-sided drains with dark output.  Await Cx.  Continue current care.  Tracy Jay MD

## 2019-08-18 LAB
ANION GAP SERPL CALC-SCNC: 10 MMOL/L — SIGNIFICANT CHANGE UP (ref 5–17)
BUN SERPL-MCNC: 20 MG/DL — SIGNIFICANT CHANGE UP (ref 7–23)
CALCIUM SERPL-MCNC: 7.8 MG/DL — LOW (ref 8.4–10.5)
CHLORIDE SERPL-SCNC: 104 MMOL/L — SIGNIFICANT CHANGE UP (ref 96–108)
CO2 SERPL-SCNC: 23 MMOL/L — SIGNIFICANT CHANGE UP (ref 22–31)
CREAT SERPL-MCNC: 0.89 MG/DL — SIGNIFICANT CHANGE UP (ref 0.5–1.3)
GLUCOSE SERPL-MCNC: 86 MG/DL — SIGNIFICANT CHANGE UP (ref 70–99)
HCT VFR BLD CALC: 27.2 % — LOW (ref 34.5–45)
HGB BLD-MCNC: 8.5 G/DL — LOW (ref 11.5–15.5)
MAGNESIUM SERPL-MCNC: 1.9 MG/DL — SIGNIFICANT CHANGE UP (ref 1.6–2.6)
MCHC RBC-ENTMCNC: 30.8 PG — SIGNIFICANT CHANGE UP (ref 27–34)
MCHC RBC-ENTMCNC: 31.3 GM/DL — LOW (ref 32–36)
MCV RBC AUTO: 98.6 FL — SIGNIFICANT CHANGE UP (ref 80–100)
PHOSPHATE SERPL-MCNC: 2.7 MG/DL — SIGNIFICANT CHANGE UP (ref 2.5–4.5)
PLATELET # BLD AUTO: 250 K/UL — SIGNIFICANT CHANGE UP (ref 150–400)
POTASSIUM SERPL-MCNC: 3.8 MMOL/L — SIGNIFICANT CHANGE UP (ref 3.5–5.3)
POTASSIUM SERPL-SCNC: 3.8 MMOL/L — SIGNIFICANT CHANGE UP (ref 3.5–5.3)
RBC # BLD: 2.76 M/UL — LOW (ref 3.8–5.2)
RBC # FLD: 20.9 % — HIGH (ref 10.3–14.5)
SODIUM SERPL-SCNC: 137 MMOL/L — SIGNIFICANT CHANGE UP (ref 135–145)
WBC # BLD: 11.78 K/UL — HIGH (ref 3.8–10.5)
WBC # FLD AUTO: 11.78 K/UL — HIGH (ref 3.8–10.5)

## 2019-08-18 PROCEDURE — 99024 POSTOP FOLLOW-UP VISIT: CPT

## 2019-08-18 RX ORDER — LEVALBUTEROL 1.25 MG/.5ML
0.63 SOLUTION, CONCENTRATE RESPIRATORY (INHALATION) EVERY 6 HOURS
Refills: 0 | Status: DISCONTINUED | OUTPATIENT
Start: 2019-08-18 | End: 2019-08-27

## 2019-08-18 RX ORDER — MAGNESIUM SULFATE 500 MG/ML
1 VIAL (ML) INJECTION ONCE
Refills: 0 | Status: COMPLETED | OUTPATIENT
Start: 2019-08-18 | End: 2019-08-18

## 2019-08-18 RX ORDER — ACETAMINOPHEN 500 MG
650 TABLET ORAL ONCE
Refills: 0 | Status: COMPLETED | OUTPATIENT
Start: 2019-08-18 | End: 2019-08-18

## 2019-08-18 RX ADMIN — INSULIN GLARGINE 6 UNIT(S): 100 INJECTION, SOLUTION SUBCUTANEOUS at 22:00

## 2019-08-18 RX ADMIN — HEPARIN SODIUM 5000 UNIT(S): 5000 INJECTION INTRAVENOUS; SUBCUTANEOUS at 21:51

## 2019-08-18 RX ADMIN — ATORVASTATIN CALCIUM 20 MILLIGRAM(S): 80 TABLET, FILM COATED ORAL at 21:49

## 2019-08-18 RX ADMIN — Medication 400 UNIT(S): at 18:27

## 2019-08-18 RX ADMIN — Medication 10 MILLIGRAM(S): at 11:19

## 2019-08-18 RX ADMIN — BUDESONIDE AND FORMOTEROL FUMARATE DIHYDRATE 2 PUFF(S): 160; 4.5 AEROSOL RESPIRATORY (INHALATION) at 05:39

## 2019-08-18 RX ADMIN — HEPARIN SODIUM 5000 UNIT(S): 5000 INJECTION INTRAVENOUS; SUBCUTANEOUS at 05:40

## 2019-08-18 RX ADMIN — MEROPENEM 100 MILLIGRAM(S): 1 INJECTION INTRAVENOUS at 21:50

## 2019-08-18 RX ADMIN — Medication 3 MILLILITER(S): at 05:39

## 2019-08-18 RX ADMIN — Medication 3 MILLILITER(S): at 00:39

## 2019-08-18 RX ADMIN — LORATADINE 10 MILLIGRAM(S): 10 TABLET ORAL at 18:30

## 2019-08-18 RX ADMIN — Medication 3 MILLILITER(S): at 17:26

## 2019-08-18 RX ADMIN — MAGNESIUM OXIDE 400 MG ORAL TABLET 400 MILLIGRAM(S): 241.3 TABLET ORAL at 18:29

## 2019-08-18 RX ADMIN — Medication 650 MILLIGRAM(S): at 21:48

## 2019-08-18 RX ADMIN — Medication 250 MILLIGRAM(S): at 05:39

## 2019-08-18 RX ADMIN — PANTOPRAZOLE SODIUM 40 MILLIGRAM(S): 20 TABLET, DELAYED RELEASE ORAL at 05:40

## 2019-08-18 RX ADMIN — MEROPENEM 100 MILLIGRAM(S): 1 INJECTION INTRAVENOUS at 05:38

## 2019-08-18 RX ADMIN — MAGNESIUM OXIDE 400 MG ORAL TABLET 400 MILLIGRAM(S): 241.3 TABLET ORAL at 11:15

## 2019-08-18 RX ADMIN — BUDESONIDE AND FORMOTEROL FUMARATE DIHYDRATE 2 PUFF(S): 160; 4.5 AEROSOL RESPIRATORY (INHALATION) at 18:26

## 2019-08-18 RX ADMIN — Medication 650 MILLIGRAM(S): at 22:18

## 2019-08-18 RX ADMIN — TIOTROPIUM BROMIDE 1 CAPSULE(S): 18 CAPSULE ORAL; RESPIRATORY (INHALATION) at 21:52

## 2019-08-18 RX ADMIN — MONTELUKAST 10 MILLIGRAM(S): 4 TABLET, CHEWABLE ORAL at 21:49

## 2019-08-18 RX ADMIN — Medication 250 MILLIGRAM(S): at 18:21

## 2019-08-18 RX ADMIN — Medication 100 GRAM(S): at 11:17

## 2019-08-18 RX ADMIN — Medication 3 MILLILITER(S): at 11:16

## 2019-08-18 RX ADMIN — MEROPENEM 100 MILLIGRAM(S): 1 INJECTION INTRAVENOUS at 15:21

## 2019-08-18 RX ADMIN — HEPARIN SODIUM 5000 UNIT(S): 5000 INJECTION INTRAVENOUS; SUBCUTANEOUS at 18:28

## 2019-08-18 RX ADMIN — Medication 62.5 MILLIMOLE(S): at 11:15

## 2019-08-18 NOTE — PROGRESS NOTE ADULT - ASSESSMENT
COPD with chronic persistent asthma: patient appears stable at this time with normal RA sats (96%) and comfortable breathing supine. She has scattered rhonchi on exam, yet no wheezing and good aeration. CXR is clear.  Moderate MS and Mild AS on recent TTE and moderate systolic CHF: patient does not appear to be in decompensated CHF at this time; there is no evidence of pulmonary edema radiographically or on physical exam, although cannot exclude interstitial edema. Mild tachycardia likely secondary to intrabdominal infection, albuterol HFA could contribute. Of note, patient is currently on bid lasix and LR at 50 /hour    REC    Continue duoneb qid: DC proventil  Symbicort 80/ bid  Stable without prednisone  monitor renal function and dose diuretics accordingly  Monitor O2 sats

## 2019-08-18 NOTE — PROGRESS NOTE ADULT - SUBJECTIVE AND OBJECTIVE BOX
Interval Events: no acute interval events    S: Patient doing well, denies fevers, chills, nausea, emesis, chest pain, SOB.    O: Vital Signs  T(C): 36.7 (08-18 @ 01:49), Max: 37.2 (08-17 @ 21:06)  HR: 90 (08-18 @ 01:49) (90 - 98)  BP: 124/73 (08-18 @ 01:49) (96/58 - 124/73)  RR: 18 (08-18 @ 01:49) (18 - 20)  SpO2: 97% (08-18 @ 01:49) (94% - 98%)  08-16-19 @ 07:01  -  08-17-19 @ 07:00  --------------------------------------------------------  IN: 1150 mL / OUT: 620 mL / NET: 530 mL    08-17-19 @ 07:01  -  08-18-19 @ 05:37  --------------------------------------------------------  IN: 1030 mL / OUT: 785 mL / NET: 245 mL      Gen: NAD, resting in bed, alert and responding appropriately  Resp: Airway patent, non-labored respirations  Abd: soft, mildly tender, nondistended. Ostomy in place with scant output. Strattice in place. 10 Ukrainian pigtail catheter placement in LLQ and 10 Ukrainian pigtail in left retroperitoneal fluid collection.   Ext: No edema, WWP  Neuro: AAOx3, no focal deficits                            8.3    12.46 )-----------( 246      ( 17 Aug 2019 12:54 )             26.2   08-17    140  |  108  |  26<H>  ----------------------------<  127<H>  4.1   |  22  |  1.01    Ca    7.8<L>      17 Aug 2019 13:41  Phos  3.5     08-17  Mg     2.3     08-17

## 2019-08-18 NOTE — PROGRESS NOTE ADULT - ASSESSMENT
ASSESSMENT  74y F with recent history of ex lap, Lupe's procedure for perforated diverticulitis with sepsis.  She had multiple abdominal washouts and abdomen closed with strattice. She is here with abdominal pain    Plan:   - ID: Abx - Meropenem and vancomycin  - CLD  - F/u cultures from abdominal wall collections  - F/u Wound care recs    Green team  [phone]

## 2019-08-18 NOTE — PROGRESS NOTE ADULT - ATTENDING COMMENTS
I have seen and evaluated the patient and discussed the relevant clinical findings and plan with the surgical housestaff and fellow.  Agree with the above documentation with addenda as noted.     Clinically improving s/p IR drainage of abdominal collections.  No pain reported and tolerating PO.  No fevers.  WBC trending down.  Abdomen soft  Midline wound is granulating  Drains brownish output    Continue abx, awaiting cultures  Supportive care  Advance diet as tolerating   Will consult PT/rehab    Clark Alvarado MD

## 2019-08-18 NOTE — PROGRESS NOTE ADULT - SUBJECTIVE AND OBJECTIVE BOX
Patient sitting comfortably in bed  denies any new issues  states that breathing status is at baseline    PAST MEDICAL & SURGICAL HISTORY:  Refusal of blood transfusions as patient is Judaism  Patient is Judaism  Vertigo  Atrial flutter  Diverticulitis  Cardiomyopathy  Kidney stone  Cardiac Pacemaker  HTN - Hypertension  Gout  Diabetes  Congestive Heart Failure  Asthma  S/P cholecystectomy  AICD (Automatic Cardioverter/Defibrillator) Present: inserted in Aug, 2008. Due for battery change in 1 month. ( [employer]) . Inserted by Dr Duffy    Allergies    Coreg (Other)  digoxin (Other; Short breath (Mild to Mod))  IV Contrast (Unknown)  penicillins (Hives)    Intolerances    metoprolol (Other)  Solu-Medrol (Other (Mild to Mod))    FAMILY HISTORY:  Family history of brain tumor    SH: Lives in same house as daughter: fully indeplendent; non smoker  REVIEW OF SYSTEMS  [  ] ROS unobtainable because:    [  ] All other systems negative except as noted below:	    Constitutional:  [ ] fever [ ] chills  [ ] weight loss  [ ] weakness  Skin:  [ ] rash [ ] phlebitis	  Eyes: [ ] icterus [ ] pain  [ ] discharge	  ENMT: [ ] sore throat  [ ] thrush [ ] ulcers [ ] exudates  Respiratory: [ ] dyspnea [ ] hemoptysis [ ] cough [ ] sputum	  Cardiovascular:  [ ] chest pain [ ] palpitations [ ] edema	  Gastrointestinal:  [ ] nausea [ ] vomiting [ ] diarrhea [ ] constipation [ ] pain	  Genitourinary:  [ ] dysuria [ ] frequency [ ] hematuria [ ] discharge [ ] flank pain  [ ] incontinence  Musculoskeletal:  [ ] myalgias [ ] arthralgias [ ] arthritis  [ ] back pain  Neurological:  [ ] headache [ ] seizures  [ ] confusion/altered mental status  Psychiatric:  [ ] anxiety [ ] depression	  Hematology/Lymphatics:  [ ] lymphadenopathy  Endocrine:  [ ] adrenal [ ] thyroid  Allergic/Immunologic:	 [ ] transplant [ ] seasonal      Medications:  MEDICATIONS  (STANDING):  ALBUTerol    90 MICROgram(s) HFA Inhaler 1 Puff(s) Inhalation every 4 hours  ALBUTerol/ipratropium for Nebulization 3 milliLiter(s) Nebulizer every 6 hours  atorvastatin 20 milliGRAM(s) Oral at bedtime  chlorhexidine 2% Cloths 1 Application(s) Topical daily  cholecalciferol 400 Unit(s) Oral daily  dextrose 50% Injectable 12.5 Gram(s) IV Push once  dextrose 50% Injectable 25 Gram(s) IV Push once  dextrose 50% Injectable 25 Gram(s) IV Push once  furosemide    Tablet 40 milliGRAM(s) Oral two times a day  insulin glargine Injectable (LANTUS) 6 Unit(s) SubCutaneous at bedtime  insulin lispro (HumaLOG) corrective regimen sliding scale   SubCutaneous every 6 hours  lactated ringers. 1000 milliLiter(s) (50 mL/Hr) IV Continuous <Continuous>  loratadine 10 milliGRAM(s) Oral daily  magnesium oxide 400 milliGRAM(s) Oral three times a day with meals  magnesium sulfate  IVPB 1 Gram(s) IV Intermittent once  meropenem  IVPB 1000 milliGRAM(s) IV Intermittent every 8 hours  montelukast 10 milliGRAM(s) Oral daily  pantoprazole    Tablet 40 milliGRAM(s) Oral before breakfast  phytonadione   Solution 10 milliGRAM(s) Oral daily  sodium phosphate IVPB 15 milliMole(s) IV Intermittent once  tiotropium 18 MICROgram(s) Capsule 1 Capsule(s) Inhalation daily  tiotropium 18 MICROgram(s) Capsule 1 Capsule(s) Inhalation daily  vancomycin  IVPB 1000 milliGRAM(s) IV Intermittent every 12 hours    MEDICATIONS  (PRN):  dextrose 40% Gel 15 Gram(s) Oral once PRN Blood Glucose LESS THAN 70 milliGRAM(s)/deciliter  glucagon  Injectable 1 milliGRAM(s) IntraMuscular once PRN Glucose LESS THAN 70 milligrams/deciliter  guaiFENesin    Syrup 100 milliGRAM(s) Oral every 6 hours PRN Cough    Vital Signs Last 24 Hrs  T(C): 37.4 (16 Aug 2019 06:08), Max: 37.4 (15 Aug 2019 17:43)  T(F): 99.4 (16 Aug 2019 06:08), Max: 99.4 (15 Aug 2019 17:43)  HR: 109 (16 Aug 2019 06:08) (105 - 109)  BP: 102/64 (16 Aug 2019 06:08) (96/60 - 138/72)  BP(mean): --  RR: 20 (16 Aug 2019 06:08) (18 - 22)  SpO2: 96% (16 Aug 2019 06:08) (95% - 98%)          08-15 @ 07:01  -  08-16 @ 07:00  --------------------------------------------------------  IN: 2090 mL / OUT: 560 mL / NET: 1530 mL      LABS:                        9.9    20.3  )-----------( 278      ( 16 Aug 2019 05:08 )             31.8     08-16    137  |  101  |  40<H>  ----------------------------<  127<H>  4.5   |  23  |  1.20    Ca    8.2<L>      16 Aug 2019 05:06  Phos  2.7     08-16  Mg     1.9     08-16    TPro  7.4  /  Alb  2.5<L>  /  TBili  0.6  /  DBili  x   /  AST  16  /  ALT  10  /  AlkPhos  131<H>  08-14      PT/INR - ( 16 Aug 2019 05:06 )   PT: 21.5 sec;   INR: 1.85 ratio         PTT - ( 16 Aug 2019 05:06 )  PTT:31.4 sec      CULTURES:  Culture Results:   Growth in aerobic bottle: Gram Positive Cocci in Clusters  "Due to technical problems, Proteus sp. will Not be reported as part of  the BCID panel until further notice"  ***Blood Panel PCR results on this specimen are available  approximately 3 hours after the Gram stain result.***  Gram stain, PCR, and/or culture results may not always  correspond due to difference in methodologies.  ************************************************************  This PCR assay was performed using [employer].  The following targets are tested for: Enterococcus,  vancomycin resistant enterococci, Listeria monocytogenes,  coagulase negative staphylococci, S. aureus,  methicillin resistant S. aureus, Streptococcus agalactiae  (Group B), S. pneumoniae, S.pyogenes (Group A),  Acinetobacter baumannii, Enterobacter cloacae, E. coli,  Klebsiella oxytoca, K. pneumoniae, Proteus sp.,  Serratia marcescens, Haemophilus influenzae,  Neisseria meningitidis, Pseudomonas aeruginosa, Candida  albicans, C. glabrata, C krusei, C parapsilosis,  C. tropicalis and the KPC resistance gene. (08-14 @ 17:44)  Culture Results:   No growth to date. (08-14 @ 17:44)    Physical Examination:    General: Non toxic, No acute distress.      HEENT: Pupils equal, reactive to light.  Symmetric.    PULM: Scattered exp rhonchi, no wheeze, good aeration    CVS: Regular rate and rhythm, no murmurs, rubs, or gallops    ABD: Soft, mild diffuse tenderness; ostomly and drains noted    EXT: No edema, nontender    SKIN: Warm and well perfused, no rashes noted.    NEURO: Alert, oriented, interactive, nonfocal    RADIOLOGY REVIEWED PERSONALLY  CXR:    PROCEDURE DATE:  08/16/2019        INTERPRETATION:  CLINICAL INFORMATION: Abnormal lung sounds and wheezing.    TECHNIQUE: Portable AP radiograph of the chest.    COMPARISON: Portable AP radiograph of the chest 8/14/2019. CT abdomen   pelvis 8/14/2019    FINDINGS:    The lungs are clear. No pleural effusion or pneumothorax.    Cardiomediastinal silhouette is normal. Left chest wall 3-lead AICD.    Unremarkable skeletal structures.      IMPRESSION:     Clear lungs        CT chest:    TTE:

## 2019-08-19 LAB
ANION GAP SERPL CALC-SCNC: 8 MMOL/L — SIGNIFICANT CHANGE UP (ref 5–17)
BUN SERPL-MCNC: 18 MG/DL — SIGNIFICANT CHANGE UP (ref 7–23)
CALCIUM SERPL-MCNC: 8.1 MG/DL — LOW (ref 8.4–10.5)
CHLORIDE SERPL-SCNC: 104 MMOL/L — SIGNIFICANT CHANGE UP (ref 96–108)
CO2 SERPL-SCNC: 24 MMOL/L — SIGNIFICANT CHANGE UP (ref 22–31)
CREAT SERPL-MCNC: 1.07 MG/DL — SIGNIFICANT CHANGE UP (ref 0.5–1.3)
CULTURE RESULTS: SIGNIFICANT CHANGE UP
GLUCOSE SERPL-MCNC: 101 MG/DL — HIGH (ref 70–99)
HCT VFR BLD CALC: 27.6 % — LOW (ref 34.5–45)
HGB BLD-MCNC: 8.6 G/DL — LOW (ref 11.5–15.5)
MAGNESIUM SERPL-MCNC: 1.9 MG/DL — SIGNIFICANT CHANGE UP (ref 1.6–2.6)
MCHC RBC-ENTMCNC: 31.2 GM/DL — LOW (ref 32–36)
MCHC RBC-ENTMCNC: 31.7 PG — SIGNIFICANT CHANGE UP (ref 27–34)
MCV RBC AUTO: 102 FL — HIGH (ref 80–100)
PHOSPHATE SERPL-MCNC: 2.9 MG/DL — SIGNIFICANT CHANGE UP (ref 2.5–4.5)
PLATELET # BLD AUTO: 239 K/UL — SIGNIFICANT CHANGE UP (ref 150–400)
POTASSIUM SERPL-MCNC: 4 MMOL/L — SIGNIFICANT CHANGE UP (ref 3.5–5.3)
POTASSIUM SERPL-SCNC: 4 MMOL/L — SIGNIFICANT CHANGE UP (ref 3.5–5.3)
RBC # BLD: 2.72 M/UL — LOW (ref 3.8–5.2)
RBC # FLD: 18.6 % — HIGH (ref 10.3–14.5)
SODIUM SERPL-SCNC: 136 MMOL/L — SIGNIFICANT CHANGE UP (ref 135–145)
SPECIMEN SOURCE: SIGNIFICANT CHANGE UP
VANCOMYCIN TROUGH SERPL-MCNC: 22.7 UG/ML — HIGH (ref 10–20)
WBC # BLD: 16.1 K/UL — HIGH (ref 3.8–10.5)
WBC # FLD AUTO: 16.1 K/UL — HIGH (ref 3.8–10.5)

## 2019-08-19 PROCEDURE — 99231 SBSQ HOSP IP/OBS SF/LOW 25: CPT

## 2019-08-19 PROCEDURE — 74177 CT ABD & PELVIS W/CONTRAST: CPT | Mod: 26

## 2019-08-19 PROCEDURE — 99024 POSTOP FOLLOW-UP VISIT: CPT

## 2019-08-19 PROCEDURE — 99232 SBSQ HOSP IP/OBS MODERATE 35: CPT

## 2019-08-19 RX ORDER — APIXABAN 2.5 MG/1
5 TABLET, FILM COATED ORAL EVERY 12 HOURS
Refills: 0 | Status: DISCONTINUED | OUTPATIENT
Start: 2019-08-19 | End: 2019-08-19

## 2019-08-19 RX ORDER — HEPARIN SODIUM 5000 [USP'U]/ML
5000 INJECTION INTRAVENOUS; SUBCUTANEOUS EVERY 8 HOURS
Refills: 0 | Status: DISCONTINUED | OUTPATIENT
Start: 2019-08-19 | End: 2019-08-21

## 2019-08-19 RX ORDER — DIPHENHYDRAMINE HCL 50 MG
50 CAPSULE ORAL ONCE
Refills: 0 | Status: COMPLETED | OUTPATIENT
Start: 2019-08-19 | End: 2019-08-19

## 2019-08-19 RX ADMIN — BUDESONIDE AND FORMOTEROL FUMARATE DIHYDRATE 2 PUFF(S): 160; 4.5 AEROSOL RESPIRATORY (INHALATION) at 18:17

## 2019-08-19 RX ADMIN — Medication 50 MILLIGRAM(S): at 13:04

## 2019-08-19 RX ADMIN — LEVALBUTEROL 0.63 MILLIGRAM(S): 1.25 SOLUTION, CONCENTRATE RESPIRATORY (INHALATION) at 00:39

## 2019-08-19 RX ADMIN — CHLORHEXIDINE GLUCONATE 1 APPLICATION(S): 213 SOLUTION TOPICAL at 12:06

## 2019-08-19 RX ADMIN — HEPARIN SODIUM 5000 UNIT(S): 5000 INJECTION INTRAVENOUS; SUBCUTANEOUS at 06:22

## 2019-08-19 RX ADMIN — LEVALBUTEROL 0.63 MILLIGRAM(S): 1.25 SOLUTION, CONCENTRATE RESPIRATORY (INHALATION) at 12:06

## 2019-08-19 RX ADMIN — Medication 40 MILLIGRAM(S): at 09:52

## 2019-08-19 RX ADMIN — HEPARIN SODIUM 5000 UNIT(S): 5000 INJECTION INTRAVENOUS; SUBCUTANEOUS at 22:24

## 2019-08-19 RX ADMIN — ATORVASTATIN CALCIUM 20 MILLIGRAM(S): 80 TABLET, FILM COATED ORAL at 22:24

## 2019-08-19 RX ADMIN — BUDESONIDE AND FORMOTEROL FUMARATE DIHYDRATE 2 PUFF(S): 160; 4.5 AEROSOL RESPIRATORY (INHALATION) at 06:21

## 2019-08-19 RX ADMIN — INSULIN GLARGINE 6 UNIT(S): 100 INJECTION, SOLUTION SUBCUTANEOUS at 22:31

## 2019-08-19 RX ADMIN — Medication 100 MILLIGRAM(S): at 00:38

## 2019-08-19 RX ADMIN — PANTOPRAZOLE SODIUM 40 MILLIGRAM(S): 20 TABLET, DELAYED RELEASE ORAL at 06:21

## 2019-08-19 RX ADMIN — MAGNESIUM OXIDE 400 MG ORAL TABLET 400 MILLIGRAM(S): 241.3 TABLET ORAL at 16:33

## 2019-08-19 RX ADMIN — MEROPENEM 100 MILLIGRAM(S): 1 INJECTION INTRAVENOUS at 13:04

## 2019-08-19 RX ADMIN — Medication 1: at 16:34

## 2019-08-19 RX ADMIN — MEROPENEM 100 MILLIGRAM(S): 1 INJECTION INTRAVENOUS at 06:18

## 2019-08-19 RX ADMIN — LEVALBUTEROL 0.63 MILLIGRAM(S): 1.25 SOLUTION, CONCENTRATE RESPIRATORY (INHALATION) at 18:16

## 2019-08-19 RX ADMIN — LORATADINE 10 MILLIGRAM(S): 10 TABLET ORAL at 16:33

## 2019-08-19 RX ADMIN — Medication 400 UNIT(S): at 16:33

## 2019-08-19 RX ADMIN — MONTELUKAST 10 MILLIGRAM(S): 4 TABLET, CHEWABLE ORAL at 16:33

## 2019-08-19 RX ADMIN — LEVALBUTEROL 0.63 MILLIGRAM(S): 1.25 SOLUTION, CONCENTRATE RESPIRATORY (INHALATION) at 06:20

## 2019-08-19 RX ADMIN — MEROPENEM 100 MILLIGRAM(S): 1 INJECTION INTRAVENOUS at 22:19

## 2019-08-19 RX ADMIN — Medication 10 MILLIGRAM(S): at 16:32

## 2019-08-19 RX ADMIN — TIOTROPIUM BROMIDE 1 CAPSULE(S): 18 CAPSULE ORAL; RESPIRATORY (INHALATION) at 12:06

## 2019-08-19 NOTE — PROGRESS NOTE ADULT - SUBJECTIVE AND OBJECTIVE BOX
Follow Up:  Intraabdominal Abscess    Interval History: No chills or fevers. still with some abdominal pain,     REVIEW OF SYSTEMS  [  ] ROS unobtainable because:    [ x ] All other systems negative except as noted below    Constitutional:  [ ] fever [ ] chills  [ ] weight loss  [ ] weakness  Skin:  [ ] rash [ ] phlebitis	  Eyes: [ ] icterus [ ] pain  [ ] discharge	  ENMT: [ ] sore throat  [ ] thrush [ ] ulcers [ ] exudates  Respiratory: [ ] dyspnea [ ] hemoptysis [ x] cough [ ] sputum	  Cardiovascular:  [ ] chest pain [ ] palpitations [ ] edema	  Gastrointestinal:  [ ] nausea [ ] vomiting [ ] diarrhea [ ] constipation [x ] pain	  Genitourinary:  [ ] dysuria [ ] frequency [ ] hematuria [ ] discharge [ ] flank pain  [ ] incontinence  Musculoskeletal:  [ ] myalgias [ ] arthralgias [ ] arthritis  [ ] back pain  Neurological:  [ ] headache [ ] seizures  [ ] confusion/altered mental status    Allergies  Coreg (Other)  digoxin (Other; Short breath (Mild to Mod))  IV Contrast (Unknown)  penicillins (Hives)        ANTIMICROBIALS:  meropenem  IVPB 1000 every 8 hours      OTHER MEDS:  MEDICATIONS  (STANDING):  atorvastatin 20 at bedtime  buDESOnide  80 MICROgram(s)/formoterol 4.5 MICROgram(s) Inhaler 2 two times a day  dextrose 40% Gel 15 once PRN  dextrose 50% Injectable 12.5 once  dextrose 50% Injectable 25 once  dextrose 50% Injectable 25 once  glucagon  Injectable 1 once PRN  insulin glargine Injectable (LANTUS) 6 at bedtime  insulin lispro (HumaLOG) corrective regimen sliding scale  three times a day before meals  insulin lispro (HumaLOG) corrective regimen sliding scale  at bedtime  levalbuterol Inhalation 0.63 every 6 hours  loratadine 10 daily  montelukast 10 daily  pantoprazole    Tablet 40 before breakfast  tiotropium 18 MICROgram(s) Capsule 1 daily  tiotropium 18 MICROgram(s) Capsule 1 daily      Vital Signs Last 24 Hrs  T(C): 36.6 (19 Aug 2019 16:33), Max: 37.5 (18 Aug 2019 17:36)  T(F): 97.9 (19 Aug 2019 16:33), Max: 99.5 (18 Aug 2019 17:36)  HR: 84 (19 Aug 2019 16:33) (84 - 100)  BP: 118/77 (19 Aug 2019 16:33) (111/72 - 135/73)  BP(mean): --  RR: 18 (19 Aug 2019 16:33) (18 - 18)  SpO2: 98% (19 Aug 2019 16:33) (97% - 98%)    PHYSICAL EXAMINATION:  General: Alert and Awake, NAD  HEENT: PERRL, EOMI, No subconjunctival hemorrhages, Oropharynx Clear, MMM  Neck: Supple, No SARAHI  Cardiac: RRR, No M/R/G  Resp: CTAB, No Wh/Rh/Ra  Abdomen: +midline abdominal wound with mesh and some surrounding foul smelling drainage. 2 LLQ drains with mixed blood and brown liquid. NBS, ND, Mild diffuse tenderness to palpation, No HSM, No rigidity or guarding  MSK: Trace LE edema. No stigmata of IE. No evidence of phlebitis. No evidence of synovitis.  : External female urinary catheter  Skin: No rashes or lesions. Skin is warm and dry to the touch.   Neuro: Alert and Awake. CN 2-12 Grossly intact. Moves all four extremities spontaneously.  Psych: Calm, Pleasant, Cooperative                          8.6    16.10 )-----------( 239      ( 19 Aug 2019 08:49 )             27.6       08-19    136  |  104  |  18  ----------------------------<  101<H>  4.0   |  24  |  1.07    Ca    8.1<L>      19 Aug 2019 08:47  Phos  2.9     08-19  Mg     1.9     08-19            MICROBIOLOGY:  Vancomycin Level, Trough: 22.7 ug/mL (08-19-19 @ 08:49)  v  .Body Fluid right abdomen wall  08-16-19 --  --    Few polymorphonuclear leukocytes per low power field  No organisms seen per oil power field      .Body Fluid left retroperitoneal  08-16-19   Numerous Escherichia coli  Numerous Alpha hemolytic strep "Susceptibilities not performed"  Moderate Bacteroides thetaiotaomicron "Susceptibilities not performed"  --  Escherichia coli      .Blood  08-16-19   No growth to date.  --  --      .Blood  08-14-19   Growth in aerobic bottle: Coag Negative Staphylococcus  Single set isolate, possible contaminant. Contact  Microbiology if susceptibility testing clinically  indicated.  "Due to technical problems, Proteus sp. will Not be reported as part of  the BCID panel until further notice"  ***Blood Panel PCR results on this specimen are available  approximately 3 hours after the Gram stain result.***  Gram stain, PCR, and/or culture results may not always  correspond due to difference in methodologies.  ************************************************************  This PCR assay was performed using [employer].  The following targets are tested for: Enterococcus,  vancomycin resistant enterococci, Listeria monocytogenes,  coagulase negative staphylococci, S. aureus,  methicillin resistant S. aureus, Streptococcus agalactiae  (Group B), S. pneumoniae, S. pyogenes (Group A),  Acinetobacter baumannii, Enterobacter cloacae, E. coli,  Klebsiella oxytoca, K. pneumoniae, Proteus sp.,  Serratia marcescens, Haemophilus influenzae,  Neisseria meningitidis, Pseudomonas aeruginosa, Candida  albicans, C. glabrata, C krusei, C parapsilosis,  C. tropicalis and the KPC resistance gene.  --  Blood Culture PCR      .Sputum  07-29-19   Moderate Pseudomonas aeruginosa (Carbapenem Resistant) #2  Normal Respiratory Liz present  --  Pseudomonas aeruginosa (Carbapenem Resistant)      .Urine  07-24-19   No growth  --  --      .Body Fluid  07-23-19   Numerous Escherichia coli  Few Coag Negative Staphylococcus  Numerous Escherichia coli #2  --  Escherichia coli  Escherichia coli    RADIOLOGY:    EXAM:  CT ABDOMEN AND PELVIS OC IC                        PROCEDURE DATE:  08/19/2019    Status post left hemicolectomy and right lower quadrant ostomy, with large ventral abdominal wall hernia.   Percutaneous drainage catheters have been placed within the left lower ventral abdominal wall collection, and left paracolic gutter collection.   Within the collections, there is extraluminal contrast consistent with dehiscence of the sigmoid colon stump.  Minimal decrease in right ventral wall fluid collection.

## 2019-08-19 NOTE — PROGRESS NOTE ADULT - ASSESSMENT
COPD with chronic persistent asthma: patient appears stable at this time with normal RA sats (96%) and comfortable breathing supine. She has scattered rhonchi on exam, yet no wheezing and good aeration. CXR is clear.  Moderate MS and Mild AS on recent TTE and moderate systolic CHF: patient does not appear to be in decompensated CHF at this time; there is no evidence of pulmonary edema radiographically or on physical exam, although cannot exclude interstitial edema. Mild tachycardia likely secondary to intrabdominal infection, albuterol HFA could contribute. Of note, patient is currently on bid lasix and LR at 50 /hour    REC    Continue duoneb qid  Symbicort 80/ bid  Stable without prednisone  monitor renal function and dose diuretics accordingly  Monitor O2 sats

## 2019-08-19 NOTE — PROGRESS NOTE ADULT - ATTENDING COMMENTS
I have seen and evaluated the patient and discussed the relevant clinical findings and plan with the surgical housestaff and fellow.  Agree with the above documentation with addenda as noted.     Clinically stable, afebrile, no complaints, resting comfortably.  Tolerating reg diet.    Abdomen is soft, tender on left side, no rebound  Midline wound is granulating; ostomy with stool in bag.  IR drains are foul-smelling; no erythema or fluctuance surrounding.  No leakage.    WBC up to 16 today    Will obtain CT A/P with rectal contrast to eval for fistula and size of collections    Clark Alvarado MD

## 2019-08-19 NOTE — PROGRESS NOTE ADULT - ASSESSMENT
ASSESSMENT  74y F with recent history of ex lap, Lupe's procedure for perforated diverticulitis with sepsis.  She had multiple abdominal washouts and abdomen closed with strattice. She is here with abdominal pain    Plan:   - ID: Abx - Meropenem and vancomycin  - LFD (consistent carb)  - F/u cultures from abdominal wall collections  - F/u Wound care recs    Green team  [id number]

## 2019-08-19 NOTE — PROGRESS NOTE ADULT - SUBJECTIVE AND OBJECTIVE BOX
Interventional Radiology Follow- Up Note      This is a 74y Female s/p LLQ abscess drainage x2 and RLQ abscess FNA on 8/16/ in Interventional Radiology with Dr. Neal.     Patient seen and examined @ bedside. Denies abdominal pain, fever, chills, nausea, vomiting. Patient currently on 1g Meropenem q8r.     PAST MEDICAL & SURGICAL HISTORY:  Refusal of blood transfusions as patient is Presybeterian  Patient is Presybeterian  Vertigo  Atrial flutter  Diverticulitis  Cardiomyopathy  Kidney stone  Cardiac Pacemaker  HTN - Hypertension  Gout  Diabetes  Congestive Heart Failure  Asthma  S/P cholecystectomy  AICD (Automatic Cardioverter/Defibrillator) Present: inserted in Aug, 2008. Due for battery change in 1 month. ( [employer]) . Inserted by Dr Duffy    Allergies: Coreg (Other)  digoxin (Other; Short breath (Mild to Mod))  IV Contrast (Unknown)  penicillins (Hives)    Intolerances: metoprolol (Other)  Solu-Medrol (Other (Mild to Mod))      LABS:                        8.6    16.10 )-----------( 239      ( 19 Aug 2019 08:49 )             27.6     08-19    136  |  104  |  18  ----------------------------<  101<H>  4.0   |  24  |  1.07    Ca    8.1<L>      19 Aug 2019 08:47  Phos  2.9     08-19  Mg     1.9     08-19    right abdominal wall Cultures 8/16: neg for growth  Left RP collection cultures 8/16L E.coli    Vitals: T(F): 98.8 (08-19-19 @ 06:19), Max: 99.5 (08-18-19 @ 17:36)  HR: 87 (08-19-19 @ 06:19) (70 - 100)  BP: 111/72 (08-19-19 @ 06:19) (111/72 - 135/73)  RR: 18 (08-19-19 @ 06:19) (18 - 18)  SpO2: 98% (08-19-19 @ 06:19) (97% - 98%)    PHYSICAL EXAM:  General: NAD, awake and alert  Abd: Obese, soft, mildly TTP, ostomy intact, Right abdominal wall FNA puncture site soft with dressing c/d/i, LLQ drainage catheters intact to BASIM with dark brown output, drains flushed with 5cc NS w/o difficulty, no leakage at site noted, dressings c/d/i. Output foul smelling    A/P: This is a 74y Female with PMH of CHF, s/p AICD, asthma, HTN, HLD, atrial fibrillation on home eliquis, perforated diverticulosis s/p Delgado's procedure with multiple RTOR for abd washouts who presented with c/o left sided abd pain and "cellulitis" to left abdomen. Patient found to have multiple intraabdonCT abd/Pelvis w/ IV Cont (08.14.19) demonstrated a right subcutaneous collection, left abdominal wall collection and a left intraabdominal collection. IR requested for drainage.         Plan:  -continue to monitor ouput    -flush drain per doctor orders  -trend vitals, labs  -will discuss with IR attending     Please call IR at extension [phone] with any questions, concerns, or issues regarding above. Interventional Radiology Follow- Up Note    This is a 74y Female s/p LLQ abscess drainage x2 and RLQ abscess FNA on 8/16/ in Interventional Radiology with Dr. Neal.     Patient seen and examined @ bedside. Denies abdominal pain, fever, chills, nausea, vomiting. Patient currently on 1g Meropenem q8r.     PAST MEDICAL & SURGICAL HISTORY:  Refusal of blood transfusions as patient is Hoahaoism  Patient is Hoahaoism  Vertigo  Atrial flutter  Diverticulitis  Cardiomyopathy  Kidney stone  Cardiac Pacemaker  HTN - Hypertension  Gout  Diabetes  Congestive Heart Failure  Asthma  S/P cholecystectomy  AICD (Automatic Cardioverter/Defibrillator) Present: inserted in Aug, 2008. Due for battery change in 1 month. ( [employer]) . Inserted by Dr Duffy    Allergies: Coreg (Other)  digoxin (Other; Short breath (Mild to Mod))  IV Contrast (Unknown)  penicillins (Hives)    Intolerances: metoprolol (Other)  Solu-Medrol (Other (Mild to Mod))      LABS:                        8.6    16.10 )-----------( 239      ( 19 Aug 2019 08:49 )             27.6     08-19    136  |  104  |  18  ----------------------------<  101<H>  4.0   |  24  |  1.07    Ca    8.1<L>      19 Aug 2019 08:47  Phos  2.9     08-19  Mg     1.9     08-19    right abdominal wall Cultures 8/16: neg for growth  Left RP collection cultures 8/16L E.coli    Vitals: T(F): 98.8 (08-19-19 @ 06:19), Max: 99.5 (08-18-19 @ 17:36)  HR: 87 (08-19-19 @ 06:19) (70 - 100)  BP: 111/72 (08-19-19 @ 06:19) (111/72 - 135/73)  RR: 18 (08-19-19 @ 06:19) (18 - 18)  SpO2: 98% (08-19-19 @ 06:19) (97% - 98%)    PHYSICAL EXAM:  General: NAD, awake and alert  Abd: Obese, soft, mildly TTP, ostomy intact, Right abdominal wall FNA puncture site soft with dressing c/d/i, LLQ drainage catheters intact to BASIM with dark brown output, drains flushed with 5cc NS w/o difficulty, no leakage at site noted, dressings c/d/i. Output foul smelling    A/P: This is a 74y Female with PMH of CHF, s/p AICD, asthma, HTN, HLD, atrial fibrillation on home eliquis, perforated diverticulosis s/p Delgado's procedure with multiple RTOR for abd washouts who presented with c/o left sided abd pain and "cellulitis" to left abdomen. Patient found to have multiple intraabdominal collections now s/p drainage of LLQ collections x2 and right abdominal wall abscess FNA on 8/16/19 in IR with Dr. Walker.   -WBC increased from 11.78 (yesterday) --> 16.10 (today)  -Patient afebrile, output decreasing from both drains. Spoke with JANAY Garcia, team planning on obtaining repeat CT abd/ pelvis   -flush drain per doctor orders  -trend vitals, labs  -Please call IR at extension [phone] with any questions, concerns, or issues regarding above.

## 2019-08-19 NOTE — PROGRESS NOTE ADULT - ASSESSMENT
74 year old female PMH of HFrEF, s/p AICD, asthma, HTN, HLD, atrial fibrillation on eliquis, with previous perforated diverticulosis, Hartmans procedure with multiple RTOR for abd washouts. Admitted 6/30 - 8/2 for feculent peritonitis with septic shock secondary to perforated diverticulitis with hospital course c/b HCAP 2/2 CR Pseudomonas.    CT A/P 8/14 with new left lower ventral abdominal wall air-containing fluid collection with intraperitoneal communication to the level of the sigmoid colon stump and Additional enlargement of the left paracolic gutter collection.     Repeat CT A/P 8/19 with extraluminal contrast consistent with dehiscence of the sigmoid colon stump. IR Drain Cultures with E. coli, AHS and Bacteroides. Would discontinue Vancomycin and continue Meropenem    Overall, Intraabdominal abscess with sigmoid colon stump dehiscence, Leukocytosis    #Intraabdominal Abscesses and Sigmoid Stump Dehiscence  --Stop Vancomycin  --Recommend Surgical input regarding stump dehiscence - not likely to be cured with antibiotics alone  --Continue Meropenem 1g IV 8H  --Follow up on final IR drainage cultures  --Follow up on prelim BCx    #CoNS on BCx - likely procurement contaminant   --F/U Repeat BCx    #Leukocytosis - related to intraabdominal abscess  --Continue antibiotics as above  --Continue to trend WBC    I will continue to follow. Please feel free to contact me with any further questions.    Rex Goode M.D.  [facility] Division of Infectious Disease  8AM-5PM: Pager Number [phone]  After Hours (or if no response): Please contact the Infectious Diseases Office at [phone] 74 year old female PMH of HFrEF, s/p AICD, asthma, HTN, HLD, atrial fibrillation on eliquis, with previous perforated diverticulosis, Hartmans procedure with multiple RTOR for abd washouts. Admitted 6/30 - 8/2 for feculent peritonitis with septic shock secondary to perforated diverticulitis with hospital course c/b HCAP 2/2 CR Pseudomonas.    CT A/P 8/14 with new left lower ventral abdominal wall air-containing fluid collection with intraperitoneal communication to the level of the sigmoid colon stump and Additional enlargement of the left paracolic gutter collection.     Repeat CT A/P 8/19 with extraluminal contrast consistent with dehiscence of the sigmoid colon stump. IR Drain Cultures with E. coli, AHS and Bacteroides. Would discontinue Vancomycin and continue Meropenem    Overall, Intraabdominal abscess with sigmoid colon stump dehiscence, Leukocytosis    #Intraabdominal Abscesses and Sigmoid Stump Dehiscence  --Stop Vancomycin  --Recommend Surgical input regarding stump dehiscence - abscesses are not likely to be cured with antibiotics alone  --Continue Meropenem 1g IV 8H  --Follow up on final IR drainage cultures  --Follow up on prelim BCx    #CoNS on BCx - likely procurement contaminant   --F/U Repeat BCx    #Leukocytosis - related to intraabdominal abscess  --Continue antibiotics as above  --Continue to trend WBC    I will continue to follow. Please feel free to contact me with any further questions.    Rex Goode M.D.  [facility] Division of Infectious Disease  8AM-5PM: Pager Number [phone]  After Hours (or if no response): Please contact the Infectious Diseases Office at [phone]

## 2019-08-19 NOTE — PROGRESS NOTE ADULT - SUBJECTIVE AND OBJECTIVE BOX
Interval Events: Advanced diet    S: Patient doing well, denies fevers, chills, nausea, emesis, chest pain, SOB.    O: Vital Signs  T(C): 36.8 (08-19 @ 00:41), Max: 37.5 (08-18 @ 17:36)  HR: 89 (08-19 @ 00:41) (70 - 100)  BP: 135/73 (08-19 @ 00:41) (109/68 - 135/73)  RR: 18 (08-19 @ 00:41) (18 - 18)  SpO2: 98% (08-19 @ 00:41) (97% - 98%)  08-17-19 @ 07:01  -  08-18-19 @ 07:00  --------------------------------------------------------  IN: 1980 mL / OUT: 1340 mL / NET: 640 mL    08-18-19 @ 07:01  -  08-19-19 @ 04:39  --------------------------------------------------------  IN: 800 mL / OUT: 525 mL / NET: 275 mL      General: alert and oriented, NAD  Resp: airway patent, respirations unlabored  CVS: regular rate and rhythm  Abd: soft, mildly tender, nondistended. Ostomy in place with scant output. Strattice in place. 10 Danish pigtail catheter placement in LLQ and 10 Danish pigtail in left retroperitoneal fluid collection.  Extremities: no edema  Skin: warm, dry, appropriate color                          8.5    11.78 )-----------( 250      ( 18 Aug 2019 08:33 )             27.2   08-18    137  |  104  |  20  ----------------------------<  86  3.8   |  23  |  0.89    Ca    7.8<L>      18 Aug 2019 06:39  Phos  2.7     08-18  Mg     1.9     08-18

## 2019-08-19 NOTE — PROGRESS NOTE ADULT - SUBJECTIVE AND OBJECTIVE BOX
Afebrile on meropneum  Breathing comfortably supine on RA: sat 93%    PAST MEDICAL & SURGICAL HISTORY:  Refusal of blood transfusions as patient is Spiritism  Patient is Spiritism  Vertigo  Atrial flutter  Diverticulitis  Cardiomyopathy  Kidney stone  Cardiac Pacemaker  HTN - Hypertension  Gout  Diabetes  Congestive Heart Failure  Asthma  S/P cholecystectomy  AICD (Automatic Cardioverter/Defibrillator) Present: inserted in Aug, 2008. Due for battery change in 1 month. ( zerved) . Inserted by Dr Duffy    Allergies    Coreg (Other)  digoxin (Other; Short breath (Mild to Mod))  IV Contrast (Unknown)  penicillins (Hives)    Intolerances    metoprolol (Other)  Solu-Medrol (Other (Mild to Mod))    FAMILY HISTORY:  Family history of brain tumor    SH: Lives in same house as daughter: fully indeplendent; non smoker  REVIEW OF SYSTEMS  [  ] ROS unobtainable because:    [  ] All other systems negative except as noted below:	    Constitutional:  [ ] fever [ ] chills  [ ] weight loss  [ ] weakness  Skin:  [ ] rash [ ] phlebitis	  Eyes: [ ] icterus [ ] pain  [ ] discharge	  ENMT: [ ] sore throat  [ ] thrush [ ] ulcers [ ] exudates  Respiratory: [ ] dyspnea [ ] hemoptysis [ ] cough [ ] sputum	  Cardiovascular:  [ ] chest pain [ ] palpitations [ ] edema	  Gastrointestinal:  [ ] nausea [ ] vomiting [ ] diarrhea [ ] constipation [ ] pain	  Genitourinary:  [ ] dysuria [ ] frequency [ ] hematuria [ ] discharge [ ] flank pain  [ ] incontinence  Musculoskeletal:  [ ] myalgias [ ] arthralgias [ ] arthritis  [ ] back pain  Neurological:  [ ] headache [ ] seizures  [ ] confusion/altered mental status  Psychiatric:  [ ] anxiety [ ] depression	  Hematology/Lymphatics:  [ ] lymphadenopathy  Endocrine:  [ ] adrenal [ ] thyroid  Allergic/Immunologic:	 [ ] transplant [ ] seasonal      Medications:  MEDICATIONS  (STANDING):  ALBUTerol    90 MICROgram(s) HFA Inhaler 1 Puff(s) Inhalation every 4 hours  ALBUTerol/ipratropium for Nebulization 3 milliLiter(s) Nebulizer every 6 hours  atorvastatin 20 milliGRAM(s) Oral at bedtime  chlorhexidine 2% Cloths 1 Application(s) Topical daily  cholecalciferol 400 Unit(s) Oral daily  dextrose 50% Injectable 12.5 Gram(s) IV Push once  dextrose 50% Injectable 25 Gram(s) IV Push once  dextrose 50% Injectable 25 Gram(s) IV Push once  furosemide    Tablet 40 milliGRAM(s) Oral two times a day  insulin glargine Injectable (LANTUS) 6 Unit(s) SubCutaneous at bedtime  insulin lispro (HumaLOG) corrective regimen sliding scale   SubCutaneous every 6 hours  lactated ringers. 1000 milliLiter(s) (50 mL/Hr) IV Continuous <Continuous>  loratadine 10 milliGRAM(s) Oral daily  magnesium oxide 400 milliGRAM(s) Oral three times a day with meals  magnesium sulfate  IVPB 1 Gram(s) IV Intermittent once  meropenem  IVPB 1000 milliGRAM(s) IV Intermittent every 8 hours  montelukast 10 milliGRAM(s) Oral daily  pantoprazole    Tablet 40 milliGRAM(s) Oral before breakfast  phytonadione   Solution 10 milliGRAM(s) Oral daily  sodium phosphate IVPB 15 milliMole(s) IV Intermittent once  tiotropium 18 MICROgram(s) Capsule 1 Capsule(s) Inhalation daily  tiotropium 18 MICROgram(s) Capsule 1 Capsule(s) Inhalation daily  vancomycin  IVPB 1000 milliGRAM(s) IV Intermittent every 12 hours    MEDICATIONS  (PRN):  dextrose 40% Gel 15 Gram(s) Oral once PRN Blood Glucose LESS THAN 70 milliGRAM(s)/deciliter  glucagon  Injectable 1 milliGRAM(s) IntraMuscular once PRN Glucose LESS THAN 70 milligrams/deciliter  guaiFENesin    Syrup 100 milliGRAM(s) Oral every 6 hours PRN Cough    Vital Signs Last 24 Hrs  T(C): 37.4 (16 Aug 2019 06:08), Max: 37.4 (15 Aug 2019 17:43)  T(F): 99.4 (16 Aug 2019 06:08), Max: 99.4 (15 Aug 2019 17:43)  HR: 109 (16 Aug 2019 06:08) (105 - 109)  BP: 102/64 (16 Aug 2019 06:08) (96/60 - 138/72)  BP(mean): --  RR: 20 (16 Aug 2019 06:08) (18 - 22)  SpO2: 96% (16 Aug 2019 06:08) (95% - 98%)          08-15 @ 07:01  -  08-16 @ 07:00  --------------------------------------------------------  IN: 2090 mL / OUT: 560 mL / NET: 1530 mL      LABS:                        9.9    20.3  )-----------( 278      ( 16 Aug 2019 05:08 )             31.8     08-16    137  |  101  |  40<H>  ----------------------------<  127<H>  4.5   |  23  |  1.20    Ca    8.2<L>      16 Aug 2019 05:06  Phos  2.7     08-16  Mg     1.9     08-16    TPro  7.4  /  Alb  2.5<L>  /  TBili  0.6  /  DBili  x   /  AST  16  /  ALT  10  /  AlkPhos  131<H>  08-14      PT/INR - ( 16 Aug 2019 05:06 )   PT: 21.5 sec;   INR: 1.85 ratio         PTT - ( 16 Aug 2019 05:06 )  PTT:31.4 sec      CULTURES:  Culture Results:   Growth in aerobic bottle: Gram Positive Cocci in Clusters  "Due to technical problems, Proteus sp. will Not be reported as part of  the BCID panel until further notice"  ***Blood Panel PCR results on this specimen are available  approximately 3 hours after the Gram stain result.***  Gram stain, PCR, and/or culture results may not always  correspond due to difference in methodologies.  ************************************************************  This PCR assay was performed using [employer].  The following targets are tested for: Enterococcus,  vancomycin resistant enterococci, Listeria monocytogenes,  coagulase negative staphylococci, S. aureus,  methicillin resistant S. aureus, Streptococcus agalactiae  (Group B), S. pneumoniae, S.pyogenes (Group A),  Acinetobacter baumannii, Enterobacter cloacae, E. coli,  Klebsiella oxytoca, K. pneumoniae, Proteus sp.,  Serratia marcescens, Haemophilus influenzae,  Neisseria meningitidis, Pseudomonas aeruginosa, Candida  albicans, C. glabrata, C krusei, C parapsilosis,  C. tropicalis and the KPC resistance gene. (08-14 @ 17:44)  Culture Results:   No growth to date. (08-14 @ 17:44)    Physical Examination:    General: Non toxic, No acute distress.      HEENT: Pupils equal, reactive to light.  Symmetric.    PULM: Scattered exp rhonchi, no wheeze, good aeration    CVS: Regular rate and rhythm, no murmurs, rubs, or gallops    ABD: Soft, mild diffuse tenderness; ostomly and drains noted    EXT: No edema, nontender    SKIN: Warm and well perfused, no rashes noted.    NEURO: Alert, oriented, interactive, nonfocal    RADIOLOGY REVIEWED PERSONALLY  CXR:    PROCEDURE DATE:  08/16/2019        INTERPRETATION:  CLINICAL INFORMATION: Abnormal lung sounds and wheezing.    TECHNIQUE: Portable AP radiograph of the chest.    COMPARISON: Portable AP radiograph of the chest 8/14/2019. CT abdomen   pelvis 8/14/2019    FINDINGS:    The lungs are clear. No pleural effusion or pneumothorax.    Cardiomediastinal silhouette is normal. Left chest wall 3-lead AICD.    Unremarkable skeletal structures.      IMPRESSION:     Clear lungs        CT chest:    TTE:

## 2019-08-20 LAB
ANION GAP SERPL CALC-SCNC: 12 MMOL/L — SIGNIFICANT CHANGE UP (ref 5–17)
APTT BLD: 31.2 SEC — SIGNIFICANT CHANGE UP (ref 27.5–36.3)
BASOPHILS # BLD AUTO: 0.06 K/UL — SIGNIFICANT CHANGE UP (ref 0–0.2)
BASOPHILS NFR BLD AUTO: 0.4 % — SIGNIFICANT CHANGE UP (ref 0–2)
BUN SERPL-MCNC: 19 MG/DL — SIGNIFICANT CHANGE UP (ref 7–23)
CALCIUM SERPL-MCNC: 8.2 MG/DL — LOW (ref 8.4–10.5)
CHLORIDE SERPL-SCNC: 102 MMOL/L — SIGNIFICANT CHANGE UP (ref 96–108)
CO2 SERPL-SCNC: 20 MMOL/L — LOW (ref 22–31)
CREAT SERPL-MCNC: 0.86 MG/DL — SIGNIFICANT CHANGE UP (ref 0.5–1.3)
EOSINOPHIL # BLD AUTO: 0.01 K/UL — SIGNIFICANT CHANGE UP (ref 0–0.5)
EOSINOPHIL NFR BLD AUTO: 0.1 % — SIGNIFICANT CHANGE UP (ref 0–6)
GLUCOSE SERPL-MCNC: 172 MG/DL — HIGH (ref 70–99)
HCT VFR BLD CALC: 29.9 % — LOW (ref 34.5–45)
HGB BLD-MCNC: 9.4 G/DL — LOW (ref 11.5–15.5)
IMM GRANULOCYTES NFR BLD AUTO: 1.2 % — SIGNIFICANT CHANGE UP (ref 0–1.5)
INR BLD: 1.27 RATIO — HIGH (ref 0.88–1.16)
LYMPHOCYTES # BLD AUTO: 1.12 K/UL — SIGNIFICANT CHANGE UP (ref 1–3.3)
LYMPHOCYTES # BLD AUTO: 6.7 % — LOW (ref 13–44)
MAGNESIUM SERPL-MCNC: 1.9 MG/DL — SIGNIFICANT CHANGE UP (ref 1.6–2.6)
MANUAL SMEAR VERIFICATION: SIGNIFICANT CHANGE UP
MCHC RBC-ENTMCNC: 30.7 PG — SIGNIFICANT CHANGE UP (ref 27–34)
MCHC RBC-ENTMCNC: 31.4 GM/DL — LOW (ref 32–36)
MCV RBC AUTO: 97.7 FL — SIGNIFICANT CHANGE UP (ref 80–100)
MONOCYTES # BLD AUTO: 0.51 K/UL — SIGNIFICANT CHANGE UP (ref 0–0.9)
MONOCYTES NFR BLD AUTO: 3.1 % — SIGNIFICANT CHANGE UP (ref 2–14)
NEUTROPHILS # BLD AUTO: 14.73 K/UL — HIGH (ref 1.8–7.4)
NEUTROPHILS NFR BLD AUTO: 88.5 % — HIGH (ref 43–77)
PHOSPHATE SERPL-MCNC: 3 MG/DL — SIGNIFICANT CHANGE UP (ref 2.5–4.5)
PLAT MORPH BLD: NORMAL — SIGNIFICANT CHANGE UP
PLATELET # BLD AUTO: 290 K/UL — SIGNIFICANT CHANGE UP (ref 150–400)
POTASSIUM SERPL-MCNC: 4.4 MMOL/L — SIGNIFICANT CHANGE UP (ref 3.5–5.3)
POTASSIUM SERPL-SCNC: 4.4 MMOL/L — SIGNIFICANT CHANGE UP (ref 3.5–5.3)
PROTHROM AB SERPL-ACNC: 14.4 SEC — HIGH (ref 10–13.1)
RBC # BLD: 3.06 M/UL — LOW (ref 3.8–5.2)
RBC # FLD: 20.8 % — HIGH (ref 10.3–14.5)
RBC BLD AUTO: NORMAL — SIGNIFICANT CHANGE UP
SODIUM SERPL-SCNC: 134 MMOL/L — LOW (ref 135–145)
WBC # BLD: 16.63 K/UL — HIGH (ref 3.8–10.5)
WBC # FLD AUTO: 16.63 K/UL — HIGH (ref 3.8–10.5)

## 2019-08-20 PROCEDURE — 93010 ELECTROCARDIOGRAM REPORT: CPT

## 2019-08-20 PROCEDURE — 71045 X-RAY EXAM CHEST 1 VIEW: CPT | Mod: 26

## 2019-08-20 PROCEDURE — 99024 POSTOP FOLLOW-UP VISIT: CPT

## 2019-08-20 RX ORDER — SODIUM CHLORIDE 9 MG/ML
1000 INJECTION INTRAMUSCULAR; INTRAVENOUS; SUBCUTANEOUS
Refills: 0 | Status: DISCONTINUED | OUTPATIENT
Start: 2019-08-20 | End: 2019-08-21

## 2019-08-20 RX ADMIN — TIOTROPIUM BROMIDE 1 CAPSULE(S): 18 CAPSULE ORAL; RESPIRATORY (INHALATION) at 13:43

## 2019-08-20 RX ADMIN — Medication 400 UNIT(S): at 13:44

## 2019-08-20 RX ADMIN — HEPARIN SODIUM 5000 UNIT(S): 5000 INJECTION INTRAVENOUS; SUBCUTANEOUS at 13:42

## 2019-08-20 RX ADMIN — MEROPENEM 100 MILLIGRAM(S): 1 INJECTION INTRAVENOUS at 13:43

## 2019-08-20 RX ADMIN — CHLORHEXIDINE GLUCONATE 1 APPLICATION(S): 213 SOLUTION TOPICAL at 13:52

## 2019-08-20 RX ADMIN — BUDESONIDE AND FORMOTEROL FUMARATE DIHYDRATE 2 PUFF(S): 160; 4.5 AEROSOL RESPIRATORY (INHALATION) at 17:58

## 2019-08-20 RX ADMIN — LEVALBUTEROL 0.63 MILLIGRAM(S): 1.25 SOLUTION, CONCENTRATE RESPIRATORY (INHALATION) at 05:32

## 2019-08-20 RX ADMIN — LEVALBUTEROL 0.63 MILLIGRAM(S): 1.25 SOLUTION, CONCENTRATE RESPIRATORY (INHALATION) at 17:57

## 2019-08-20 RX ADMIN — MEROPENEM 100 MILLIGRAM(S): 1 INJECTION INTRAVENOUS at 05:27

## 2019-08-20 RX ADMIN — PANTOPRAZOLE SODIUM 40 MILLIGRAM(S): 20 TABLET, DELAYED RELEASE ORAL at 05:33

## 2019-08-20 RX ADMIN — Medication 10 MILLIGRAM(S): at 14:24

## 2019-08-20 RX ADMIN — MAGNESIUM OXIDE 400 MG ORAL TABLET 400 MILLIGRAM(S): 241.3 TABLET ORAL at 13:42

## 2019-08-20 RX ADMIN — Medication 1: at 13:44

## 2019-08-20 RX ADMIN — LORATADINE 10 MILLIGRAM(S): 10 TABLET ORAL at 13:42

## 2019-08-20 RX ADMIN — MEROPENEM 100 MILLIGRAM(S): 1 INJECTION INTRAVENOUS at 22:02

## 2019-08-20 RX ADMIN — BUDESONIDE AND FORMOTEROL FUMARATE DIHYDRATE 2 PUFF(S): 160; 4.5 AEROSOL RESPIRATORY (INHALATION) at 05:29

## 2019-08-20 RX ADMIN — LEVALBUTEROL 0.63 MILLIGRAM(S): 1.25 SOLUTION, CONCENTRATE RESPIRATORY (INHALATION) at 13:43

## 2019-08-20 RX ADMIN — HEPARIN SODIUM 5000 UNIT(S): 5000 INJECTION INTRAVENOUS; SUBCUTANEOUS at 05:29

## 2019-08-20 RX ADMIN — ATORVASTATIN CALCIUM 20 MILLIGRAM(S): 80 TABLET, FILM COATED ORAL at 22:03

## 2019-08-20 RX ADMIN — MONTELUKAST 10 MILLIGRAM(S): 4 TABLET, CHEWABLE ORAL at 13:41

## 2019-08-20 RX ADMIN — Medication 1: at 08:56

## 2019-08-20 RX ADMIN — MAGNESIUM OXIDE 400 MG ORAL TABLET 400 MILLIGRAM(S): 241.3 TABLET ORAL at 08:57

## 2019-08-20 RX ADMIN — MAGNESIUM OXIDE 400 MG ORAL TABLET 400 MILLIGRAM(S): 241.3 TABLET ORAL at 17:57

## 2019-08-20 RX ADMIN — INSULIN GLARGINE 6 UNIT(S): 100 INJECTION, SOLUTION SUBCUTANEOUS at 21:59

## 2019-08-20 RX ADMIN — LEVALBUTEROL 0.63 MILLIGRAM(S): 1.25 SOLUTION, CONCENTRATE RESPIRATORY (INHALATION) at 00:55

## 2019-08-20 RX ADMIN — Medication 1: at 19:47

## 2019-08-20 RX ADMIN — HEPARIN SODIUM 5000 UNIT(S): 5000 INJECTION INTRAVENOUS; SUBCUTANEOUS at 22:04

## 2019-08-20 NOTE — PROGRESS NOTE ADULT - ASSESSMENT
ASSESSMENT  74y F with recent history of ex lap, Lupe's procedure for perforated diverticulitis with sepsis.  She had multiple abdominal washouts and abdomen closed with strattice. CT scan demonstrated LLQ and abdominal wall fluid collections which are s/p IR drain placement on 8/16. She now has new CT demonstrating rectal stump leak. ID discontinued Vanco and continuing with Meropenem. She is otherwise hemodynamically stable and afebrile.     Plan:   - ID: Abx - Meropenem  - LFD (consistent carb)  - F/u cultures from abdominal wall collections growing alpha hemolytic strep, bacteroides, ecoli and enterococcus faecalis    - CT demonstrating rectal stump leak  - Trend WBC    Green team  [id number] ASSESSMENT  74y F with recent history of ex lap, Lupe's procedure for perforated diverticulitis with sepsis.  She had multiple abdominal washouts and abdomen closed with strattice. CT scan demonstrated LLQ and abdominal wall fluid collections which are s/p IR drain placement on 8/16. She now has new CT demonstrating rectal stump leak. ID discontinued Vanco and continuing with Meropenem. She is otherwise hemodynamically stable and afebrile.     Plan:   - ID: Abx - Meropenem  - LFD (consistent carb)  - F/u cultures from abdominal wall collections growing alpha hemolytic strep, bacteroides, ecoli and enterococcus faecalis    - Trend WBC    Green team  [id number]

## 2019-08-20 NOTE — PROGRESS NOTE ADULT - ATTENDING COMMENTS
I have seen and evaluated the patient and discussed the relevant clinical findings and plan with the surgical housestaff and fellow.  Agree with the above documentation with addenda as noted.     Clinically stable and afebrile.  Tolerating regular diet.  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  Will need long term abx   Rehab planning    Clark Alvarado MD

## 2019-08-20 NOTE — PROGRESS NOTE ADULT - SUBJECTIVE AND OBJECTIVE BOX
ANESTHESIA POSTOP CHECK    74y Female POSTOP DAY 1      Vital Signs Last 24 Hrs  T(C): 36.4 (20 Aug 2019 04:39), Max: 36.6 (19 Aug 2019 16:33)  T(F): 97.6 (20 Aug 2019 04:39), Max: 97.9 (19 Aug 2019 16:33)  HR: 88 (20 Aug 2019 08:07) (83 - 108)  BP: 131/79 (20 Aug 2019 04:39) (118/76 - 147/73)  BP(mean): --  RR: 18 (20 Aug 2019 04:39) (18 - 18)  SpO2: 98% (20 Aug 2019 04:39) (98% - 100%)  I&O's Summary    19 Aug 2019 07:01  -  20 Aug 2019 07:00  --------------------------------------------------------  IN: 150 mL / OUT: 570 mL / NET: -420 mL        [x] NO APPARENT ANESTHESIA COMPLICATIONS

## 2019-08-20 NOTE — PROGRESS NOTE ADULT - REASON FOR ADMISSION
LLQ abdominal fluid collection Sepsis and LLQ/abdominal fluid collection related to rectal stump leak from previous surgery

## 2019-08-20 NOTE — PROGRESS NOTE ADULT - SUBJECTIVE AND OBJECTIVE BOX
Green Team Surgery Progress Note     SUBJECTIVE / 24H EVENTS  Patient seen and examined on morning rounds. No acute events overnight. She had her Vanco discontinued per ID and CT scan demonstrated contrast extravasation from the rectal stump.    OBJECTIVE:    VITAL SIGNS:  T(C): 36.6 (08-20-19 @ 01:01), Max: 37.1 (08-19-19 @ 06:19)  HR: 83 (08-20-19 @ 01:01) (83 - 87)  BP: 147/73 (08-20-19 @ 01:01) (111/72 - 147/73)  RR: 18 (08-20-19 @ 01:01) (18 - 18)  SpO2: 100% (08-20-19 @ 01:01) (98% - 100%)      POCT Blood Glucose.: 197 mg/dL (08-19-19 @ 22:18)  POCT Blood Glucose.: 214 mg/dL (08-19-19 @ 21:21)  POCT Blood Glucose.: 192 mg/dL (08-19-19 @ 16:18)      PHYSICAL EXAM:  General: alert and oriented, NAD  Resp: airway patent, respirations unlabored  CVS: regular rate and rhythm  Abd: soft, mildly tender, nondistended. Ostomy in place with scant output. Strattice in place. 10 [place] pigtail catheter placement in LLQ and 10 [place] pigtail in left retroperitoneal fluid collection.  Extremities: [place]      08-18-19 @ 07:01  -  08-19-19 @ 07:00  --------------------------------------------------------  IN:    Oral Fluid: 300 mL    Solution: 250 mL    Solution: 50 mL    Solution: 300 mL  Total IN: 900 mL    OUT:    Bulb: 20 mL    Bulb: 5 mL    Colostomy: 100 mL    Voided: 1100 mL  Total OUT: 1225 mL    Total NET: -325 mL      08-19-19 @ 07:01  -  08-20-19 @ 02:01  --------------------------------------------------------  IN:    Solution: 100 mL  Total IN: 100 mL    OUT:    Bulb: 10 mL    Bulb: 60 mL    Colostomy: 125 mL    Voided: 175 mL  Total OUT: 370 mL    Total NET: -270 mL          LAB VALUES:  08-19    136  |  104  |  18  ----------------------------<  101<H>  4.0   |  24  |  1.07    Ca    8.1<L>      19 Aug 2019 08:47  Phos  2.9     08-19  Mg     1.9     08-19                                 8.6    16.10 )-----------( 239      ( 19 Aug 2019 08:49 )             27.6                   MICROBIOLOGY:    Culture - Body Fluid with Gram Stain (08.16.19 @ 15:21)    Gram Stain:   Few polymorphonuclear leukocytes per low power field  No organisms seen per oil power field    Specimen Source: .Body Fluid right abdomen wall    Culture - Body Fluid with Gram Stain (08.16.19 @ 15:18)    -  Amikacin: S <=16    -  Ampicillin: R >16 These ampicillin results predict results for amoxicillin    -  Ampicillin: S <=2 Predicts results to ampicillin/sulbactam, amoxacillin-clavulanate and  piperacillin-tazobactam.    -  Ampicillin/Sulbactam: R >16/8 Enterobacter, Citrobacter, and Serratia may develop resistance during prolonged therapy (3-4 days)    -  Piperacillin/Tazobactam: S <=8    -  Gentamicin: S <=2    -  Ceftriaxone: S <=1 Enterobacter, Citrobacter, and Serratia may develop resistance during prolonged therapy    -  Cefepime: S <=2    -  Cefoxitin: S <=8    -  Cefazolin: S 4 Enterobacter, Citrobacter, and Serratia may develop resistance during prolonged therapy (3-4 days)    -  Tobramycin: R >8    -  Tetra/Doxy: R >8    -  Trimethoprim/Sulfamethoxazole: R >2/38    -  Vancomycin: S 2    -  Aztreonam: S <=4    -  Ciprofloxacin: R >2    -  Ertapenem: S <=0.5    -  Imipenem: S <=1    Gram Stain:   Numerous polymorphonuclear leukocytes per low power field  Moderate Gram Variable Rods per oil power field  Numerous Gram positive cocci in pairs, chains and clusters per oil power  field    -  Amoxicillin/Clavulanic Acid: I 16/8    -  Levofloxacin: R >4    -  Meropenem: S <=1    Specimen Source: .Body Fluid left retroperitoneal    Culture Results:   Numerous Escherichia coli  Numerous Alpha hemolytic strep "Susceptibilities not performed"  Moderate Bacteroides thetaiotaomicron "Susceptibilities not performed"  Few Enterococcus faecalis    Organism Identification: Escherichia coli  Enterococcus faecalis    Organism: Escherichia coli    Organism: Enterococcus faecalis    Method Type: COLLINS    Method Type: COLLINS      RADIOLOGY:    CT Abdomen and Pelvis w/ Oral Cont and w/ IV Cont (08.19.19 @ 15:05)   IMPRESSION:     Status post left hemicolectomy and right lower quadrant ostomy, with   large ventral abdominal wall hernia.     Percutaneous drainage catheters have been placed within the left lower   ventral abdominal wall collection, and left paracolic gutter collection.   Within the collections, there is extraluminal contrast consistent with   dehiscence of the sigmoid colon stump.    Minimal decrease in right ventral wall fluid collection.      MEDICATIONS  (STANDING):  atorvastatin 20 milliGRAM(s) Oral at bedtime  buDESOnide  80 MICROgram(s)/formoterol 4.5 MICROgram(s) Inhaler 2 Puff(s) Inhalation two times a day  chlorhexidine 2% Cloths 1 Application(s) Topical daily  cholecalciferol 400 Unit(s) Oral daily  dextrose 50% Injectable 12.5 Gram(s) IV Push once  dextrose 50% Injectable 25 Gram(s) IV Push once  dextrose 50% Injectable 25 Gram(s) IV Push once  heparin  Injectable 5000 Unit(s) SubCutaneous every 8 hours  insulin glargine Injectable (LANTUS) 6 Unit(s) SubCutaneous at bedtime  insulin lispro (HumaLOG) corrective regimen sliding scale   SubCutaneous three times a day before meals  insulin lispro (HumaLOG) corrective regimen sliding scale   SubCutaneous at bedtime  levalbuterol Inhalation 0.63 milliGRAM(s) Inhalation every 6 hours  loratadine 10 milliGRAM(s) Oral daily  magnesium oxide 400 milliGRAM(s) Oral three times a day with meals  meropenem  IVPB 1000 milliGRAM(s) IV Intermittent every 8 hours  montelukast 10 milliGRAM(s) Oral daily  pantoprazole    Tablet 40 milliGRAM(s) Oral before breakfast  phytonadione   Solution 10 milliGRAM(s) Oral daily  tetracaine/benzocaine/butamben Spray 1 Spray(s) Topical once  tiotropium 18 MICROgram(s) Capsule 1 Capsule(s) Inhalation daily  tiotropium 18 MICROgram(s) Capsule 1 Capsule(s) Inhalation daily    MEDICATIONS  (PRN):  dextrose 40% Gel 15 Gram(s) Oral once PRN Blood Glucose LESS THAN 70 milliGRAM(s)/deciliter  glucagon  Injectable 1 milliGRAM(s) IntraMuscular once PRN Glucose LESS THAN 70 milligrams/deciliter

## 2019-08-20 NOTE — PROGRESS NOTE ADULT - SUBJECTIVE AND OBJECTIVE BOX
Afebrile on meropneum  Breathing comfortably supine   s/p PICC insertion at bedside    Vital Signs Last 24 Hrs  T(C): 36.7 (20 Aug 2019 10:28), Max: 36.7 (20 Aug 2019 10:28)  T(F): 98 (20 Aug 2019 10:28), Max: 98 (20 Aug 2019 10:28)  HR: 88 (20 Aug 2019 10:28) (83 - 108)  BP: 106/68 (20 Aug 2019 10:28) (106/68 - 147/73)  BP(mean): --  RR: 18 (20 Aug 2019 10:28) (18 - 18)  SpO2: 97% (20 Aug 2019 10:28) (97% - 100%)                              8.6    16.10 )-----------( 239      ( 19 Aug 2019 08:49 )             27.6       08-20    134<L>  |  102  |  19  ----------------------------<  172<H>  4.4   |  20<L>  |  0.86    Ca    8.2<L>      20 Aug 2019 09:45  Phos  3.0     08-20  Mg     1.9     08-20        PAST MEDICAL & SURGICAL HISTORY:  Refusal of blood transfusions as patient is Mandaen  Patient is Mandaen  Vertigo  Atrial flutter  Diverticulitis  Cardiomyopathy  Kidney stone  Cardiac Pacemaker  HTN - Hypertension  Gout  Diabetes  Congestive Heart Failure  Asthma  S/P cholecystectomy  AICD (Automatic Cardioverter/Defibrillator) Present: inserted in Aug, 2008. Due for battery change in 1 month. ( [id number]) . Inserted by Dr Duffy    Allergies    Coreg (Other)  digoxin (Other; Short breath (Mild to Mod))  IV Contrast (Unknown)  penicillins (Hives)    Intolerances    metoprolol (Other)  Solu-Medrol (Other (Mild to Mod))    FAMILY HISTORY:  Family history of brain tumor    SH: Lives in same house as daughter: fully indeplendent; non smoker  REVIEW OF SYSTEMS  [  ] ROS unobtainable because:    [  ] All other systems negative except as noted below:	    Constitutional:  [ ] fever [ ] chills  [ ] weight loss  [ ] weakness  Skin:  [ ] rash [ ] phlebitis	  Eyes: [ ] icterus [ ] pain  [ ] discharge	  ENMT: [ ] sore throat  [ ] thrush [ ] ulcers [ ] exudates  Respiratory: [ ] dyspnea [ ] hemoptysis [ ] cough [ ] sputum	  Cardiovascular:  [ ] chest pain [ ] palpitations [ ] edema	  Gastrointestinal:  [ ] nausea [ ] vomiting [ ] diarrhea [ ] constipation [ ] pain	  Genitourinary:  [ ] dysuria [ ] frequency [ ] hematuria [ ] discharge [ ] flank pain  [ ] incontinence  Musculoskeletal:  [ ] myalgias [ ] arthralgias [ ] arthritis  [ ] back pain  Neurological:  [ ] headache [ ] seizures  [ ] confusion/altered mental status  Psychiatric:  [ ] anxiety [ ] depression	  Hematology/Lymphatics:  [ ] lymphadenopathy  Endocrine:  [ ] adrenal [ ] thyroid  Allergic/Immunologic:	 [ ] transplant [ ] seasonal      Medications:  MEDICATIONS  (STANDING):  ALBUTerol    90 MICROgram(s) HFA Inhaler 1 Puff(s) Inhalation every 4 hours  ALBUTerol/ipratropium for Nebulization 3 milliLiter(s) Nebulizer every 6 hours  atorvastatin 20 milliGRAM(s) Oral at bedtime  chlorhexidine 2% Cloths 1 Application(s) Topical daily  cholecalciferol 400 Unit(s) Oral daily  dextrose 50% Injectable 12.5 Gram(s) IV Push once  dextrose 50% Injectable 25 Gram(s) IV Push once  dextrose 50% Injectable 25 Gram(s) IV Push once  furosemide    Tablet 40 milliGRAM(s) Oral two times a day  insulin glargine Injectable (LANTUS) 6 Unit(s) SubCutaneous at bedtime  insulin lispro (HumaLOG) corrective regimen sliding scale   SubCutaneous every 6 hours  lactated ringers. 1000 milliLiter(s) (50 mL/Hr) IV Continuous <Continuous>  loratadine 10 milliGRAM(s) Oral daily  magnesium oxide 400 milliGRAM(s) Oral three times a day with meals  magnesium sulfate  IVPB 1 Gram(s) IV Intermittent once  meropenem  IVPB 1000 milliGRAM(s) IV Intermittent every 8 hours  montelukast 10 milliGRAM(s) Oral daily  pantoprazole    Tablet 40 milliGRAM(s) Oral before breakfast  phytonadione   Solution 10 milliGRAM(s) Oral daily  sodium phosphate IVPB 15 milliMole(s) IV Intermittent once  tiotropium 18 MICROgram(s) Capsule 1 Capsule(s) Inhalation daily  tiotropium 18 MICROgram(s) Capsule 1 Capsule(s) Inhalation daily  vancomycin  IVPB 1000 milliGRAM(s) IV Intermittent every 12 hours    MEDICATIONS  (PRN):  dextrose 40% Gel 15 Gram(s) Oral once PRN Blood Glucose LESS THAN 70 milliGRAM(s)/deciliter  glucagon  Injectable 1 milliGRAM(s) IntraMuscular once PRN Glucose LESS THAN 70 milligrams/deciliter  guaiFENesin    Syrup 100 milliGRAM(s) Oral every 6 hours PRN Cough    Vital Signs Last 24 Hrs  T(C): 37.4 (16 Aug 2019 06:08), Max: 37.4 (15 Aug 2019 17:43)  T(F): 99.4 (16 Aug 2019 06:08), Max: 99.4 (15 Aug 2019 17:43)  HR: 109 (16 Aug 2019 06:08) (105 - 109)  BP: 102/64 (16 Aug 2019 06:08) (96/60 - 138/72)  BP(mean): --  RR: 20 (16 Aug 2019 06:08) (18 - 22)  SpO2: 96% (16 Aug 2019 06:08) (95% - 98%)          08-15 @ 07:01  -  08-16 @ 07:00  --------------------------------------------------------  IN: 2090 mL / OUT: 560 mL / NET: 1530 mL      LABS:                        9.9    20.3  )-----------( 278      ( 16 Aug 2019 05:08 )             31.8     08-16    137  |  101  |  40<H>  ----------------------------<  127<H>  4.5   |  23  |  1.20    Ca    8.2<L>      16 Aug 2019 05:06  Phos  2.7     08-16  Mg     1.9     08-16    TPro  7.4  /  Alb  2.5<L>  /  TBili  0.6  /  DBili  x   /  AST  16  /  ALT  10  /  AlkPhos  131<H>  08-14      PT/INR - ( 16 Aug 2019 05:06 )   PT: 21.5 sec;   INR: 1.85 ratio         PTT - ( 16 Aug 2019 05:06 )  PTT:31.4 sec      CULTURES:  Culture Results:   Growth in aerobic bottle: Gram Positive Cocci in Clusters  "Due to technical problems, Proteus sp. will Not be reported as part of  the BCID panel until further notice"  ***Blood Panel PCR results on this specimen are available  approximately 3 hours after the Gram stain result.***  Gram stain, PCR, and/or culture results may not always  correspond due to difference in methodologies.  ************************************************************  This PCR assay was performed using [employer].  The following targets are tested for: Enterococcus,  vancomycin resistant enterococci, Listeria monocytogenes,  coagulase negative staphylococci, S. aureus,  methicillin resistant S. aureus, Streptococcus agalactiae  (Group B), S. pneumoniae, S.pyogenes (Group A),  Acinetobacter baumannii, Enterobacter cloacae, E. coli,  Klebsiella oxytoca, K. pneumoniae, Proteus sp.,  Serratia marcescens, Haemophilus influenzae,  Neisseria meningitidis, Pseudomonas aeruginosa, Candida  albicans, C. glabrata, C krusei, C parapsilosis,  C. tropicalis and the KPC resistance gene. (08-14 @ 17:44)  Culture Results:   No growth to date. (08-14 @ 17:44)    Physical Examination:    General: Non toxic, No acute distress.      HEENT: Pupils equal, reactive to light.  Symmetric.    PULM: Scattered exp rhonchi, no wheeze, good aeration    CVS: Regular rate and rhythm, no murmurs, rubs, or gallops    ABD: Soft, mild diffuse tenderness; ostomly and drains noted    EXT: No edema, nontender    SKIN: Warm and well perfused, no rashes noted.    NEURO: Alert, oriented, interactive, nonfocal    RADIOLOGY REVIEWED PERSONALLY  CXR:    PROCEDURE DATE:  08/16/2019        INTERPRETATION:  CLINICAL INFORMATION: Abnormal lung sounds and wheezing.    TECHNIQUE: Portable AP radiograph of the chest.    COMPARISON: Portable AP radiograph of the chest 8/14/2019. CT abdomen   pelvis 8/14/2019    FINDINGS:    The lungs are clear. No pleural effusion or pneumothorax.    Cardiomediastinal silhouette is normal. Left chest wall 3-lead AICD.    Unremarkable skeletal structures.      IMPRESSION:     Clear lungs        CT chest:    TTE:

## 2019-08-21 LAB
ANION GAP SERPL CALC-SCNC: 8 MMOL/L — SIGNIFICANT CHANGE UP (ref 5–17)
BUN SERPL-MCNC: 15 MG/DL — SIGNIFICANT CHANGE UP (ref 7–23)
CALCIUM SERPL-MCNC: 7.4 MG/DL — LOW (ref 8.4–10.5)
CHLORIDE SERPL-SCNC: 107 MMOL/L — SIGNIFICANT CHANGE UP (ref 96–108)
CO2 SERPL-SCNC: 22 MMOL/L — SIGNIFICANT CHANGE UP (ref 22–31)
CREAT SERPL-MCNC: 0.83 MG/DL — SIGNIFICANT CHANGE UP (ref 0.5–1.3)
CULTURE RESULTS: SIGNIFICANT CHANGE UP
GLUCOSE SERPL-MCNC: 77 MG/DL — SIGNIFICANT CHANGE UP (ref 70–99)
HCT VFR BLD CALC: 30.7 % — LOW (ref 34.5–45)
HGB BLD-MCNC: 9.2 G/DL — LOW (ref 11.5–15.5)
MAGNESIUM SERPL-MCNC: 1.7 MG/DL — SIGNIFICANT CHANGE UP (ref 1.6–2.6)
MCHC RBC-ENTMCNC: 30.1 GM/DL — LOW (ref 32–36)
MCHC RBC-ENTMCNC: 30.9 PG — SIGNIFICANT CHANGE UP (ref 27–34)
MCV RBC AUTO: 103 FL — HIGH (ref 80–100)
PHOSPHATE SERPL-MCNC: 2.1 MG/DL — LOW (ref 2.5–4.5)
PLATELET # BLD AUTO: 264 K/UL — SIGNIFICANT CHANGE UP (ref 150–400)
POTASSIUM SERPL-MCNC: 4.1 MMOL/L — SIGNIFICANT CHANGE UP (ref 3.5–5.3)
POTASSIUM SERPL-SCNC: 4.1 MMOL/L — SIGNIFICANT CHANGE UP (ref 3.5–5.3)
RBC # BLD: 2.98 M/UL — LOW (ref 3.8–5.2)
RBC # FLD: 18.8 % — HIGH (ref 10.3–14.5)
SODIUM SERPL-SCNC: 137 MMOL/L — SIGNIFICANT CHANGE UP (ref 135–145)
SPECIMEN SOURCE: SIGNIFICANT CHANGE UP
WBC # BLD: 13.1 K/UL — HIGH (ref 3.8–10.5)
WBC # FLD AUTO: 13.1 K/UL — HIGH (ref 3.8–10.5)

## 2019-08-21 PROCEDURE — 75984 XRAY CONTROL CATHETER CHANGE: CPT | Mod: 26

## 2019-08-21 PROCEDURE — 99024 POSTOP FOLLOW-UP VISIT: CPT

## 2019-08-21 PROCEDURE — 99232 SBSQ HOSP IP/OBS MODERATE 35: CPT | Mod: GC

## 2019-08-21 PROCEDURE — 49423 EXCHANGE DRAINAGE CATHETER: CPT | Mod: 58

## 2019-08-21 RX ORDER — INSULIN LISPRO 100/ML
VIAL (ML) SUBCUTANEOUS AT BEDTIME
Refills: 0 | Status: DISCONTINUED | OUTPATIENT
Start: 2019-08-21 | End: 2019-08-27

## 2019-08-21 RX ORDER — INSULIN LISPRO 100/ML
VIAL (ML) SUBCUTANEOUS
Refills: 0 | Status: DISCONTINUED | OUTPATIENT
Start: 2019-08-21 | End: 2019-08-27

## 2019-08-21 RX ORDER — SODIUM CHLORIDE 9 MG/ML
1000 INJECTION, SOLUTION INTRAVENOUS
Refills: 0 | Status: DISCONTINUED | OUTPATIENT
Start: 2019-08-21 | End: 2019-08-27

## 2019-08-21 RX ORDER — APIXABAN 2.5 MG/1
5 TABLET, FILM COATED ORAL EVERY 12 HOURS
Refills: 0 | Status: DISCONTINUED | OUTPATIENT
Start: 2019-08-21 | End: 2019-08-27

## 2019-08-21 RX ORDER — INSULIN LISPRO 100/ML
VIAL (ML) SUBCUTANEOUS EVERY 6 HOURS
Refills: 0 | Status: DISCONTINUED | OUTPATIENT
Start: 2019-08-21 | End: 2019-08-21

## 2019-08-21 RX ADMIN — MEROPENEM 100 MILLIGRAM(S): 1 INJECTION INTRAVENOUS at 05:57

## 2019-08-21 RX ADMIN — LEVALBUTEROL 0.63 MILLIGRAM(S): 1.25 SOLUTION, CONCENTRATE RESPIRATORY (INHALATION) at 00:25

## 2019-08-21 RX ADMIN — LEVALBUTEROL 0.63 MILLIGRAM(S): 1.25 SOLUTION, CONCENTRATE RESPIRATORY (INHALATION) at 17:36

## 2019-08-21 RX ADMIN — BUDESONIDE AND FORMOTEROL FUMARATE DIHYDRATE 2 PUFF(S): 160; 4.5 AEROSOL RESPIRATORY (INHALATION) at 05:58

## 2019-08-21 RX ADMIN — LORATADINE 10 MILLIGRAM(S): 10 TABLET ORAL at 12:21

## 2019-08-21 RX ADMIN — LEVALBUTEROL 0.63 MILLIGRAM(S): 1.25 SOLUTION, CONCENTRATE RESPIRATORY (INHALATION) at 12:21

## 2019-08-21 RX ADMIN — MAGNESIUM OXIDE 400 MG ORAL TABLET 400 MILLIGRAM(S): 241.3 TABLET ORAL at 12:21

## 2019-08-21 RX ADMIN — SODIUM CHLORIDE 50 MILLILITER(S): 9 INJECTION INTRAMUSCULAR; INTRAVENOUS; SUBCUTANEOUS at 00:25

## 2019-08-21 RX ADMIN — Medication 400 UNIT(S): at 12:21

## 2019-08-21 RX ADMIN — INSULIN GLARGINE 6 UNIT(S): 100 INJECTION, SOLUTION SUBCUTANEOUS at 22:33

## 2019-08-21 RX ADMIN — MAGNESIUM OXIDE 400 MG ORAL TABLET 400 MILLIGRAM(S): 241.3 TABLET ORAL at 17:36

## 2019-08-21 RX ADMIN — MEROPENEM 100 MILLIGRAM(S): 1 INJECTION INTRAVENOUS at 14:00

## 2019-08-21 RX ADMIN — MEROPENEM 100 MILLIGRAM(S): 1 INJECTION INTRAVENOUS at 22:32

## 2019-08-21 RX ADMIN — Medication 2: at 18:14

## 2019-08-21 RX ADMIN — LEVALBUTEROL 0.63 MILLIGRAM(S): 1.25 SOLUTION, CONCENTRATE RESPIRATORY (INHALATION) at 05:57

## 2019-08-21 RX ADMIN — CHLORHEXIDINE GLUCONATE 1 APPLICATION(S): 213 SOLUTION TOPICAL at 12:21

## 2019-08-21 RX ADMIN — BUDESONIDE AND FORMOTEROL FUMARATE DIHYDRATE 2 PUFF(S): 160; 4.5 AEROSOL RESPIRATORY (INHALATION) at 17:36

## 2019-08-21 RX ADMIN — APIXABAN 5 MILLIGRAM(S): 2.5 TABLET, FILM COATED ORAL at 17:36

## 2019-08-21 RX ADMIN — ATORVASTATIN CALCIUM 20 MILLIGRAM(S): 80 TABLET, FILM COATED ORAL at 22:34

## 2019-08-21 RX ADMIN — MONTELUKAST 10 MILLIGRAM(S): 4 TABLET, CHEWABLE ORAL at 12:21

## 2019-08-21 RX ADMIN — Medication 62.5 MILLIMOLE(S): at 11:33

## 2019-08-21 RX ADMIN — TIOTROPIUM BROMIDE 1 CAPSULE(S): 18 CAPSULE ORAL; RESPIRATORY (INHALATION) at 12:21

## 2019-08-21 NOTE — PROGRESS NOTE ADULT - ATTENDING COMMENTS
As above, will assess tubes and upsize as there is no significant decrease in abscess anteriorly associated with fistula from bowel.

## 2019-08-21 NOTE — DIETITIAN INITIAL EVALUATION ADULT. - REASON INDICATOR FOR ASSESSMENT
Pt seen for LOS on 2Monti.  Source: pt, daughter at bedside    As per team: "74y F with recent history of ex lap, Lupe's procedure for perforated diverticulitis with sepsis.  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

## 2019-08-21 NOTE — PROGRESS NOTE ADULT - SUBJECTIVE AND OBJECTIVE BOX
Hematology Follow-up    INTERVAL HPI/OVERNIGHT EVENTS:  No o/n events. Going for tube change with IR.      VITAL SIGNS:  T(F): 97.4 (08-21-19 @ 04:55)  HR: 85 (08-21-19 @ 04:55)  BP: 113/72 (08-21-19 @ 04:55)  RR: 18 (08-21-19 @ 04:55)  SpO2: 99% (08-21-19 @ 04:55)  Wt(kg): --    08-20-19 @ 07:01  -  08-21-19 @ 07:00  --------------------------------------------------------  IN: 1090 mL / OUT: 1177 mL / NET: -87 mL        PHYSICAL EXAM:    Constitutional: AAOx3, chronically ill appearing  Eyes: PERRL, EOMI  Neck: supple  Respiratory: CTA b/l with normal respiratory effort  Cardiovascular: RRR  Gastrointestinal: soft, NTND + drain, + ostomy   Extremities:  no edema  Neurological: Grossly intact  Skin: no rash    MEDICATIONS  (STANDING):  atorvastatin 20 milliGRAM(s) Oral at bedtime  buDESOnide  80 MICROgram(s)/formoterol 4.5 MICROgram(s) Inhaler 2 Puff(s) Inhalation two times a day  chlorhexidine 2% Cloths 1 Application(s) Topical daily  cholecalciferol 400 Unit(s) Oral daily  dextrose 5%. 1000 milliLiter(s) (50 mL/Hr) IV Continuous <Continuous>  dextrose 50% Injectable 12.5 Gram(s) IV Push once  dextrose 50% Injectable 25 Gram(s) IV Push once  dextrose 50% Injectable 25 Gram(s) IV Push once  insulin glargine Injectable (LANTUS) 6 Unit(s) SubCutaneous at bedtime  insulin lispro (HumaLOG) corrective regimen sliding scale   SubCutaneous every 6 hours  levalbuterol Inhalation 0.63 milliGRAM(s) Inhalation every 6 hours  loratadine 10 milliGRAM(s) Oral daily  magnesium oxide 400 milliGRAM(s) Oral three times a day with meals  meropenem  IVPB 1000 milliGRAM(s) IV Intermittent every 8 hours  montelukast 10 milliGRAM(s) Oral daily  pantoprazole    Tablet 40 milliGRAM(s) Oral before breakfast  sodium phosphate IVPB 15 milliMole(s) IV Intermittent once  tetracaine/benzocaine/butamben Spray 1 Spray(s) Topical once  tiotropium 18 MICROgram(s) Capsule 1 Capsule(s) Inhalation daily  tiotropium 18 MICROgram(s) Capsule 1 Capsule(s) Inhalation daily    MEDICATIONS  (PRN):  dextrose 40% Gel 15 Gram(s) Oral once PRN Blood Glucose LESS THAN 70 milliGRAM(s)/deciliter  glucagon  Injectable 1 milliGRAM(s) IntraMuscular once PRN Glucose LESS THAN 70 milligrams/deciliter      Coreg (Other)  digoxin (Other; Short breath (Mild to Mod))  IV Contrast (Unknown)  penicillins (Hives)      LABS:                        9.2    13.10 )-----------( 264      ( 21 Aug 2019 09:02 )             30.7     08-21    137  |  107  |  15  ----------------------------<  77  4.1   |  22  |  0.83    Ca    7.4<L>      21 Aug 2019 06:31  Phos  2.1     08-21  Mg     1.7     08-21      PT/INR - ( 20 Aug 2019 12:52 )   PT: 14.4 sec;   INR: 1.27 ratio         PTT - ( 20 Aug 2019 12:52 )  PTT:31.2 sec       RADIOLOGY & ADDITIONAL TESTS:  Studies reviewed. Hematology Follow-up    INTERVAL HPI/OVERNIGHT EVENTS:  No o/n events. Going for tube change with IR. unhappy with the food options.      VITAL SIGNS:  T(F): 97.4 (08-21-19 @ 04:55)  HR: 85 (08-21-19 @ 04:55)  BP: 113/72 (08-21-19 @ 04:55)  RR: 18 (08-21-19 @ 04:55)  SpO2: 99% (08-21-19 @ 04:55)  Wt(kg): --    08-20-19 @ 07:01  -  08-21-19 @ 07:00  --------------------------------------------------------  IN: 1090 mL / OUT: 1177 mL / NET: -87 mL        PHYSICAL EXAM:    Constitutional: AAOx3, chronically ill appearing  Eyes: PERRL, EOMI  Neck: supple  Respiratory: CTA b/l with normal respiratory effort  Cardiovascular: RRR  Gastrointestinal: soft, NTND + drain, + ostomy   Extremities:  no edema  Neurological: Grossly intact  Skin: no rash    MEDICATIONS  (STANDING):  atorvastatin 20 milliGRAM(s) Oral at bedtime  buDESOnide  80 MICROgram(s)/formoterol 4.5 MICROgram(s) Inhaler 2 Puff(s) Inhalation two times a day  chlorhexidine 2% Cloths 1 Application(s) Topical daily  cholecalciferol 400 Unit(s) Oral daily  dextrose 5%. 1000 milliLiter(s) (50 mL/Hr) IV Continuous <Continuous>  dextrose 50% Injectable 12.5 Gram(s) IV Push once  dextrose 50% Injectable 25 Gram(s) IV Push once  dextrose 50% Injectable 25 Gram(s) IV Push once  insulin glargine Injectable (LANTUS) 6 Unit(s) SubCutaneous at bedtime  insulin lispro (HumaLOG) corrective regimen sliding scale   SubCutaneous every 6 hours  levalbuterol Inhalation 0.63 milliGRAM(s) Inhalation every 6 hours  loratadine 10 milliGRAM(s) Oral daily  magnesium oxide 400 milliGRAM(s) Oral three times a day with meals  meropenem  IVPB 1000 milliGRAM(s) IV Intermittent every 8 hours  montelukast 10 milliGRAM(s) Oral daily  pantoprazole    Tablet 40 milliGRAM(s) Oral before breakfast  sodium phosphate IVPB 15 milliMole(s) IV Intermittent once  tetracaine/benzocaine/butamben Spray 1 Spray(s) Topical once  tiotropium 18 MICROgram(s) Capsule 1 Capsule(s) Inhalation daily  tiotropium 18 MICROgram(s) Capsule 1 Capsule(s) Inhalation daily    MEDICATIONS  (PRN):  dextrose 40% Gel 15 Gram(s) Oral once PRN Blood Glucose LESS THAN 70 milliGRAM(s)/deciliter  glucagon  Injectable 1 milliGRAM(s) IntraMuscular once PRN Glucose LESS THAN 70 milligrams/deciliter      Coreg (Other)  digoxin (Other; Short breath (Mild to Mod))  IV Contrast (Unknown)  penicillins (Hives)      LABS:                        9.2    13.10 )-----------( 264      ( 21 Aug 2019 09:02 )             30.7     08-21    137  |  107  |  15  ----------------------------<  77  4.1   |  22  |  0.83    Ca    7.4<L>      21 Aug 2019 06:31  Phos  2.1     08-21  Mg     1.7     08-21      PT/INR - ( 20 Aug 2019 12:52 )   PT: 14.4 sec;   INR: 1.27 ratio         PTT - ( 20 Aug 2019 12:52 )  PTT:31.2 sec       RADIOLOGY & ADDITIONAL TESTS:  Studies reviewed. Hematology Follow-up    INTERVAL HPI/OVERNIGHT EVENTS:  No o/n events. Going for tube change with IR. unhappy with the food options.      VITAL SIGNS:  T(F): 97.4 (08-21-19 @ 04:55)  HR: 85 (08-21-19 @ 04:55)  BP: 113/72 (08-21-19 @ 04:55)  RR: 18 (08-21-19 @ 04:55)  SpO2: 99% (08-21-19 @ 04:55)  Wt(kg): --    08-20-19 @ 07:01  -  08-21-19 @ 07:00  --------------------------------------------------------  IN: 1090 mL / OUT: 1177 mL / NET: -87 mL        PHYSICAL EXAM:    Constitutional: AAOx3, chronically ill appearing  Eyes: PERRL, EOMI  Neck: supple  Respiratory: CTA b/l with normal respiratory effort  Cardiovascular: RRR  Gastrointestinal: soft, NTND + drain with prurulent drainage, + ostomy with gas in bag   Extremities:  no edema  Neurological: Grossly intact  Skin: no rash    MEDICATIONS  (STANDING):  atorvastatin 20 milliGRAM(s) Oral at bedtime  buDESOnide  80 MICROgram(s)/formoterol 4.5 MICROgram(s) Inhaler 2 Puff(s) Inhalation two times a day  chlorhexidine 2% Cloths 1 Application(s) Topical daily  cholecalciferol 400 Unit(s) Oral daily  dextrose 5%. 1000 milliLiter(s) (50 mL/Hr) IV Continuous <Continuous>  dextrose 50% Injectable 12.5 Gram(s) IV Push once  dextrose 50% Injectable 25 Gram(s) IV Push once  dextrose 50% Injectable 25 Gram(s) IV Push once  insulin glargine Injectable (LANTUS) 6 Unit(s) SubCutaneous at bedtime  insulin lispro (HumaLOG) corrective regimen sliding scale   SubCutaneous every 6 hours  levalbuterol Inhalation 0.63 milliGRAM(s) Inhalation every 6 hours  loratadine 10 milliGRAM(s) Oral daily  magnesium oxide 400 milliGRAM(s) Oral three times a day with meals  meropenem  IVPB 1000 milliGRAM(s) IV Intermittent every 8 hours  montelukast 10 milliGRAM(s) Oral daily  pantoprazole    Tablet 40 milliGRAM(s) Oral before breakfast  sodium phosphate IVPB 15 milliMole(s) IV Intermittent once  tetracaine/benzocaine/butamben Spray 1 Spray(s) Topical once  tiotropium 18 MICROgram(s) Capsule 1 Capsule(s) Inhalation daily  tiotropium 18 MICROgram(s) Capsule 1 Capsule(s) Inhalation daily    MEDICATIONS  (PRN):  dextrose 40% Gel 15 Gram(s) Oral once PRN Blood Glucose LESS THAN 70 milliGRAM(s)/deciliter  glucagon  Injectable 1 milliGRAM(s) IntraMuscular once PRN Glucose LESS THAN 70 milligrams/deciliter      Coreg (Other)  digoxin (Other; Short breath (Mild to Mod))  IV Contrast (Unknown)  penicillins (Hives)      LABS:                        9.2    13.10 )-----------( 264      ( 21 Aug 2019 09:02 )             30.7     08-21    137  |  107  |  15  ----------------------------<  77  4.1   |  22  |  0.83    Ca    7.4<L>      21 Aug 2019 06:31  Phos  2.1     08-21  Mg     1.7     08-21      PT/INR - ( 20 Aug 2019 12:52 )   PT: 14.4 sec;   INR: 1.27 ratio         PTT - ( 20 Aug 2019 12:52 )  PTT:31.2 sec       RADIOLOGY & ADDITIONAL TESTS:  Studies reviewed.

## 2019-08-21 NOTE — PROGRESS NOTE ADULT - SUBJECTIVE AND OBJECTIVE BOX
74y Female with PMH of CHF, s/p AICD, asthma, HTN, HLD, atrial fibrillation on home eliquis, perforated diverticulosis s/p Delgado's procedure with multiple RTOR for abd washouts who presented with c/o left sided abd pain and "cellulitis" to left abdomen. Patient found to have multiple intraabdominal collections now s/p drainage of LLQ collections x2 and right abdominal wall abscess FNA on 8/16/19 in IR with Dr. Walker. Pt with decreased output from the LLQ drains referred to IR for drain evaluation.   < CT Abdomen and Pelvis w/ Oral Cont and w/ IV Cont (08.19.19 @ 15:05) >A percutaneous drainage catheter is within the collection measuring 4.3 x 3.0 cm, previously 5.6 x 5.2 (3, 78) within the left paracolic gutter, with extraluminal contrast seen extending from the sigmoid colon stump. This is consistent with stump dehiscence. A small fluid collection within the right lower ventral abdominal wall appears smaller now measuring 3.5 x 2.6 cm, previously 4.5 x 2.7 cm.< end of copied text >      NPO status:   Anticoagulation: SQ heparin 5000units on 8/20 22:00hrs.  Antibiotics: Meropenem IC at 6:00AM.   AICD interrogation report on chart from 7/12/19    Allergies:Coreg (Other)  digoxin (Other; Short breath (Mild to Mod))  IV Contrast (Unknown)  metoprolol (Other)  penicillins (Hives)  Solu-Medrol (Other (Mild to Mod))      PAST MEDICAL & SURGICAL HISTORY:  Refusal of blood transfusions as patient is Quaker  Patient is Quaker  Vertigo  Atrial flutter  Diverticulitis  Cardiomyopathy  Kidney stone  Cardiac Pacemaker  HTN - Hypertension  Gout  Diabetes  Congestive Heart Failure  Asthma  S/P cholecystectomy  AICD (Automatic Cardioverter/Defibrillator) Present: inserted in Aug, 2008. Due for battery change in 1 month. ( [employer]) . Inserted by Dr Duffy        Pertinent labs:                      9.4    16.63 )-----------( 290      ( 20 Aug 2019 13:12 )             29.9   08-21    137  |  107  |  15  ----------------------------<  77  4.1   |  22  |  0.83    Ca    7.4<L>      21 Aug 2019 06:31  Phos  2.1     08-21  Mg     1.7     08-21    PT/INR - ( 20 Aug 2019 12:52 )   PT: 14.4 sec;   INR: 1.27 ratio         PTT - ( 20 Aug 2019 12:52 )  PTT:31.2 sec    Consent: Procedure/risks/ Benefits explained. Informed consent obtained. Pt verbalizes understanding. 74y Female Jehova witness with PMH of CHF, s/p AICD, asthma, HTN, HLD, atrial fibrillation on home eliquis, perforated diverticulosis s/p Delgado's procedure with multiple RTOR for abd washouts who presented with c/o left sided abd pain and "cellulitis" to left abdomen. Patient found to have multiple intraabdominal collections now s/p drainage of LLQ collections x2 and right abdominal wall abscess FNA on 8/16/19 in IR with Dr. Walker. Pt with decreased output from the LLQ drains referred to IR for drain evaluation.   < CT Abdomen and Pelvis w/ Oral Cont and w/ IV Cont (08.19.19 @ 15:05) >A percutaneous drainage catheter is within the collection measuring 4.3 x 3.0 cm, previously 5.6 x 5.2 (3, 78) within the left paracolic gutter, with extraluminal contrast seen extending from the sigmoid colon stump. This is consistent with stump dehiscence. A small fluid collection within the right lower ventral abdominal wall appears smaller now measuring 3.5 x 2.6 cm, previously 4.5 x 2.7 cm.< end of copied text >      NPO status:   Anticoagulation: SQ heparin 5000units on 8/20 22:00hrs.  Antibiotics: Meropenem IC at 6:00AM.   AICD interrogation report on chart from 7/12/19    Allergies:Coreg (Other)  digoxin (Other; Short breath (Mild to Mod))  IV Contrast and PO Contrast  (asthma exacerbation)  metoprolol (Other)  penicillins (Hives)  Solu-Medrol (Other (Mild to Mod))      PAST MEDICAL & SURGICAL HISTORY:  Refusal of blood transfusions as patient is Gnosticism  Patient is Gnosticism  Vertigo  Atrial flutter  Diverticulitis  Cardiomyopathy  Kidney stone  Cardiac Pacemaker  HTN - Hypertension  Gout  Diabetes  Congestive Heart Failure  Asthma  S/P cholecystectomy  AICD (Automatic Cardioverter/Defibrillator) Present: inserted in Aug, 2008. Due for battery change in 1 month. ( [employer]) . Inserted by Dr Duffy        Pertinent labs:                      9.4    16.63 )-----------( 290      ( 20 Aug 2019 13:12 )             29.9   08-21    137  |  107  |  15  ----------------------------<  77  4.1   |  22  |  0.83    Ca    7.4<L>      21 Aug 2019 06:31  Phos  2.1     08-21  Mg     1.7     08-21    PT/INR - ( 20 Aug 2019 12:52 )   PT: 14.4 sec;   INR: 1.27 ratio         PTT - ( 20 Aug 2019 12:52 )  PTT:31.2 sec    Consent: Procedure/risks/ Benefits explained. Informed consent obtained. Pt verbalizes understanding. 74y Female Jehova witness with PMH of CHF, s/p AICD, asthma, HTN, HLD, atrial fibrillation on home eliquis, perforated diverticulosis s/p Delgado's procedure with multiple RTOR for abd washouts who presented with c/o left sided abd pain and "cellulitis" to left abdomen. Patient found to have multiple intraabdominal collections now s/p drainage of LLQ collections x2 and right abdominal wall abscess FNA on 8/16/19 in IR with Dr. Walker. Pt with decreased output from the LLQ drains referred to IR for drain evaluation.   < CT Abdomen and Pelvis w/ Oral Cont and w/ IV Cont (08.19.19 @ 15:05) >A percutaneous drainage catheter is within the collection measuring 4.3 x 3.0 cm, previously 5.6 x 5.2 (3, 78) within the left paracolic gutter, with extraluminal contrast seen extending from the sigmoid colon stump. This is consistent with stump dehiscence. A small fluid collection within the right lower ventral abdominal wall appears smaller now measuring 3.5 x 2.6 cm, previously 4.5 x 2.7 cm.< end of copied text >      NPO status:   Anticoagulation: SQ heparin 5000units on 8/20 22:00hrs.  Antibiotics: Meropenem IC at 6:00AM.   AICD interrogation report on chart from 7/12/19    Allergies:Coreg (Other)  digoxin (Other; Short breath (Mild to Mod))  IV Contrast and PO Contrast  (asthma exacerbation)  metoprolol (Other)  penicillins (Hives)  Solu-Medrol (Other (Mild to Mod))      PAST MEDICAL & SURGICAL HISTORY:  Refusal of blood transfusions as patient is Tenriism  Patient is Tenriism  Vertigo  Atrial flutter  Diverticulitis  Cardiomyopathy  Kidney stone  Cardiac Pacemaker  HTN - Hypertension  Gout  Diabetes  Congestive Heart Failure  Asthma  S/P cholecystectomy  AICD (Automatic Cardioverter/Defibrillator) Present: inserted in Aug, 2008. Due for battery change in 1 month. ( [employer]) . Inserted by Dr Duffy        Pertinent labs:                      9.4    16.63 )-----------( 290      ( 20 Aug 2019 13:12 )             29.9   08-21    137  |  107  |  15  ----------------------------<  77  4.1   |  22  |  0.83    Ca    7.4<L>      21 Aug 2019 06:31  Phos  2.1     08-21  Mg     1.7     08-21    PT/INR - ( 20 Aug 2019 12:52 )   PT: 14.4 sec;   INR: 1.27 ratio         PTT - ( 20 Aug 2019 12:52 )  PTT:31.2 sec    Consent: Procedure/risks/ Benefits explained. Informed consent obtained. Signed by pt's daughter per pt's wishes. Pt and daughter verbalizes understanding. 74y Female Jehova witness with PMH of CHF, s/p AICD, asthma, HTN, HLD, atrial fibrillation on home eliquis, perforated diverticulosis s/p Delgado's procedure with multiple RTOR for abd washouts who presented with c/o left sided abd pain and "cellulitis" to left abdomen. Patient found to have multiple intraabdominal collections now s/p drainage of LLQ collections x2 and right abdominal wall abscess FNA on 8/16/19 in IR with Dr. Walker. Pt with decreased output from the LLQ drains referred to IR for drain evaluation.   < CT Abdomen and Pelvis w/ Oral Cont and w/ IV Cont (08.19.19 @ 15:05) >A percutaneous drainage catheter is within the collection measuring 4.3 x 3.0 cm, previously 5.6 x 5.2 (3, 78) within the left paracolic gutter, with extraluminal contrast seen extending from the sigmoid colon stump. This is consistent with stump dehiscence. A small fluid collection within the right lower ventral abdominal wall appears smaller now measuring 3.5 x 2.6 cm, previously 4.5 x 2.7 cm.< end of copied text >      NPO status: NPO past midnight.   Anticoagulation: SQ heparin 5000units on 8/20 22:00hrs.  Antibiotics: Meropenem IC at 6:00AM.   AICD interrogation report on chart from 7/12/19    Allergies:Coreg (Other)  digoxin (Other; Short breath (Mild to Mod))  IV Contrast and PO Contrast  (asthma exacerbation)  metoprolol (Other)  penicillins (Hives)  Solu-Medrol (Other (Mild to Mod))      PAST MEDICAL & SURGICAL HISTORY:  Refusal of blood transfusions as patient is Quaker  Patient is Quaker  Vertigo  Atrial flutter  Diverticulitis  Cardiomyopathy  Kidney stone  Cardiac Pacemaker  HTN - Hypertension  Gout  Diabetes  Congestive Heart Failure  Asthma  S/P cholecystectomy  AICD (Automatic Cardioverter/Defibrillator) Present: inserted in Aug, 2008. Due for battery change in 1 month. ( [employer]) . Inserted by Dr Duffy        Pertinent labs:                      9.4    16.63 )-----------( 290      ( 20 Aug 2019 13:12 )             29.9   08-21    137  |  107  |  15  ----------------------------<  77  4.1   |  22  |  0.83    Ca    7.4<L>      21 Aug 2019 06:31  Phos  2.1     08-21  Mg     1.7     08-21    PT/INR - ( 20 Aug 2019 12:52 )   PT: 14.4 sec;   INR: 1.27 ratio         PTT - ( 20 Aug 2019 12:52 )  PTT:31.2 sec    Plan is to premedicate with IV steroid and Benadryl prior to procedure.   Consent: Procedure/risks/ Benefits explained. Informed consent obtained. Signed by pt's daughter per pt's wishes. Pt and daughter verbalizes understanding.

## 2019-08-21 NOTE — PROGRESS NOTE ADULT - ASSESSMENT
ASSESSMENT  74y F with recent history of ex lap, Lupe's procedure for perforated diverticulitis with sepsis.  She had multiple abdominal washouts and abdomen closed with strattice. She is otherwise hemodynamically stable and afebrile.     Plan:   - ID: Abx - Meropenem for 1 more day and then transition to invanz through PICC  - LFD (consistent carb)  - F/u cultures from abdominal wall collections growing alpha hemolytic strep, bacteroides, ecoli and enterococcus faecalis    - Trend WBC  - IR today (8/21) for upsizing of drains  - Dispo: rehab     Green team  [id number]

## 2019-08-21 NOTE — DIETITIAN INITIAL EVALUATION ADULT. - ALTERNATE MEANS OF NUTRITION
consider consult for Speech Pathologist to determine need for further evaluation, defer consistency of diet to team; would continue c consistent CHO, low fiber diet, pt c suboptimal intake, pt and family aware of low sodium options on menu and will order accordingly, provided mechanical soft options as well to assist in food choices that would be easier to consume for pt

## 2019-08-21 NOTE — PROGRESS NOTE ADULT - SUBJECTIVE AND OBJECTIVE BOX
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     Subjective: no n/v, pain controlled    PE: NAD   abd appropriate tenderness at drain sites, drains hooked up to bag with brown drainage     Vital Signs Last 24 Hrs  T(C): 36.6 (21 Aug 2019 12:00), Max: 36.8 (21 Aug 2019 11:28)  T(F): 97.9 (21 Aug 2019 12:00), Max: 98.2 (21 Aug 2019 11:28)  HR: 88 (21 Aug 2019 12:00) (84 - 91)  BP: 118/77 (21 Aug 2019 12:00) (113/72 - 122/74)  BP(mean): --  RR: 18 (21 Aug 2019 12:00) (18 - 20)  SpO2: 98% (21 Aug 2019 12:00) (96% - 100%)    I&O's Detail    20 Aug 2019 07:01  -  21 Aug 2019 07:00  --------------------------------------------------------  IN:    Oral Fluid: 690 mL    sodium chloride 0.9%: 300 mL    Solution: 100 mL  Total IN: 1090 mL    OUT:    Bulb: 13 mL    Bulb: 14 mL    Colostomy: 150 mL    Voided: 1000 mL  Total OUT: 1177 mL    Total NET: -87 mL      21 Aug 2019 07:01  -  21 Aug 2019 13:39  --------------------------------------------------------  IN:    Solution: 250 mL  Total IN: 250 mL    OUT:    Colostomy: 50 mL    Voided: 200 mL  Total OUT: 250 mL    Total NET: 0 mL          Daily Height in cm: 160 (21 Aug 2019 08:59)    Daily Weight in k.9 (21 Aug 2019 13:03)    MEDICATIONS  (STANDING):  atorvastatin 20 milliGRAM(s) Oral at bedtime  buDESOnide  80 MICROgram(s)/formoterol 4.5 MICROgram(s) Inhaler 2 Puff(s) Inhalation two times a day  chlorhexidine 2% Cloths 1 Application(s) Topical daily  cholecalciferol 400 Unit(s) Oral daily  dextrose 5%. 1000 milliLiter(s) (50 mL/Hr) IV Continuous <Continuous>  dextrose 50% Injectable 12.5 Gram(s) IV Push once  dextrose 50% Injectable 25 Gram(s) IV Push once  dextrose 50% Injectable 25 Gram(s) IV Push once  insulin glargine Injectable (LANTUS) 6 Unit(s) SubCutaneous at bedtime  insulin lispro (HumaLOG) corrective regimen sliding scale   SubCutaneous three times a day with meals  insulin lispro (HumaLOG) corrective regimen sliding scale   SubCutaneous at bedtime  levalbuterol Inhalation 0.63 milliGRAM(s) Inhalation every 6 hours  loratadine 10 milliGRAM(s) Oral daily  magnesium oxide 400 milliGRAM(s) Oral three times a day with meals  meropenem  IVPB 1000 milliGRAM(s) IV Intermittent every 8 hours  montelukast 10 milliGRAM(s) Oral daily  pantoprazole    Tablet 40 milliGRAM(s) Oral before breakfast  tetracaine/benzocaine/butamben Spray 1 Spray(s) Topical once  tiotropium 18 MICROgram(s) Capsule 1 Capsule(s) Inhalation daily  tiotropium 18 MICROgram(s) Capsule 1 Capsule(s) Inhalation daily    MEDICATIONS  (PRN):  dextrose 40% Gel 15 Gram(s) Oral once PRN Blood Glucose LESS THAN 70 milliGRAM(s)/deciliter  glucagon  Injectable 1 milliGRAM(s) IntraMuscular once PRN Glucose LESS THAN 70 milligrams/deciliter      LABS:                        9.2    13.10 )-----------( 264      ( 21 Aug 2019 09:02 )             30.7     08-21    137  |  107  |  15  ----------------------------<  77  4.1   |  22  |  0.83    Ca    7.4<L>      21 Aug 2019 06:31  Phos  2.1     08-21  Mg     1.7     08-      PT/INR - ( 20 Aug 2019 12:52 )   PT: 14.4 sec;   INR: 1.27 ratio         PTT - ( 20 Aug 2019 12:52 )  PTT:31.2 sec

## 2019-08-21 NOTE — PROGRESS NOTE ADULT - ASSESSMENT
75 y/o F (Mu-ism) with Pmhx of HFrEF, s/p AICD, asthma, HTN, HLD, atrial fibrillation on Eliquis, with previous perforated diverticulosis, Hartmans procedure with multiple RTOR for abd washouts, closure of abdominal incision with mesh (7/11/19), returns with abd pain found to have LLQ abdominal wall collection and intraabdominal fluid collection; hematology consulted for elevated PT likely 2/2 poor nutritional status    #Elevated PT  -secondary to Vitamin K deficiency (mixing study 8/16 with correction). INR 3 on admission, s/p Vitamin K repletion x 3. now with INR 1.3 on 8/20.  -hx consistent with poor nutritional intake over the past month, had came off of TPN.    #Macrocytic anemia, Episcopal  -HGB 9.2 today. baseline 9-10  -try to minimize phlebotomy if possible;  use pediatric phlebotomy tubes when necessary    Luba Guardado MD  Hematology/Oncology Fellow  Pager: [phone] 75 y/o F (Druze) with Pmhx of HFrEF, s/p AICD, asthma, HTN, HLD, atrial fibrillation on Eliquis, with previous perforated diverticulosis, Hartmans procedure with multiple RTOR for abd washouts, closure of abdominal incision with mesh (7/11/19), returns with abd pain found to have LLQ abdominal wall collection and intraabdominal fluid collection; hematology consulted for elevated PT likely 2/2 poor nutritional status    #Elevated PT  -secondary to Vitamin K deficiency (mixing study 8/16 with correction). INR 3 on admission, s/p Vitamin K repletion x 3. now with INR 1.3 on 8/20.  -hx consistent with poor nutritional intake over the past month, had came off of TPN.    #Macrocytic anemia, Hinduism  -HGB 9.2 today. baseline 9-10  -try to minimize phlebotomy if possible;  use pediatric phlebotomy tubes when necessary    will sign off at this time. please page with any further questions.    Luba Guardado MD  Hematology/Oncology Fellow  Pager: [phone]

## 2019-08-21 NOTE — PROGRESS NOTE ADULT - SUBJECTIVE AND OBJECTIVE BOX
Interventional Radiology Procedure Note    Procedure:  Tube check and upsizing x 2    Indication:  abscess    Operators:  Oseas Redman    Anesthesia (type):  IV sedation     Contrast:  28  cc    EBL:  none    Findings/Follow up Plan of Care:  Successful upsizing of left lower quadrant drains to 14 [place] without incident.  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  Full report to follow.    Specimens Removed: none    Implants: 14 [place] drains    Complications:  none    Condition/Disposition:  PACU then floors.  Recommend increasing flushing to BID to maintain patency of tubes.    Please call Interventional Radiology x [phone] with any questions, concerns, or issues.

## 2019-08-21 NOTE — PROGRESS NOTE ADULT - SUBJECTIVE AND OBJECTIVE BOX
Afebrile on meropneum  Breathing comfortably supine     Vital Signs Last 24 Hrs  T(C): 36.6 (21 Aug 2019 12:00), Max: 36.8 (21 Aug 2019 11:28)  T(F): 97.9 (21 Aug 2019 12:00), Max: 98.2 (21 Aug 2019 11:28)  HR: 88 (21 Aug 2019 12:00) (84 - 91)  BP: 118/77 (21 Aug 2019 12:00) (113/72 - 122/74)  BP(mean): --  RR: 18 (21 Aug 2019 12:00) (18 - 20)  SpO2: 98% (21 Aug 2019 12:00) (96% - 100%)                          9.2    13.10 )-----------( 264      ( 21 Aug 2019 09:02 )             30.7       08-21    137  |  107  |  15  ----------------------------<  77  4.1   |  22  |  0.83    Ca    7.4<L>      21 Aug 2019 06:31  Phos  2.1     08-21  Mg     1.7     08-21      PAST MEDICAL & SURGICAL HISTORY:  Refusal of blood transfusions as patient is Orthodox  Patient is Orthodox  Vertigo  Atrial flutter  Diverticulitis  Cardiomyopathy  Kidney stone  Cardiac Pacemaker  HTN - Hypertension  Gout  Diabetes  Congestive Heart Failure  Asthma  S/P cholecystectomy  AICD (Automatic Cardioverter/Defibrillator) Present: inserted in Aug, 2008. Due for battery change in 1 month. ( [place]) . Inserted by Dr Duffy    Allergies    Coreg (Other)  digoxin (Other; Short breath (Mild to Mod))  IV Contrast (Unknown)  penicillins (Hives)    Intolerances    metoprolol (Other)  Solu-Medrol (Other (Mild to Mod))    FAMILY HISTORY:  Family history of brain tumor    SH: Lives in same house as daughter: fully indeplendent; non smoker  REVIEW OF SYSTEMS  [  ] ROS unobtainable because:    [  ] All other systems negative except as noted below:	    Constitutional:  [ ] fever [ ] chills  [ ] weight loss  [ ] weakness  Skin:  [ ] rash [ ] phlebitis	  Eyes: [ ] icterus [ ] pain  [ ] discharge	  ENMT: [ ] sore throat  [ ] thrush [ ] ulcers [ ] exudates  Respiratory: [ ] dyspnea [ ] hemoptysis [ ] cough [ ] sputum	  Cardiovascular:  [ ] chest pain [ ] palpitations [ ] edema	  Gastrointestinal:  [ ] nausea [ ] vomiting [ ] diarrhea [ ] constipation [ ] pain	  Genitourinary:  [ ] dysuria [ ] frequency [ ] hematuria [ ] discharge [ ] flank pain  [ ] incontinence  Musculoskeletal:  [ ] myalgias [ ] arthralgias [ ] arthritis  [ ] back pain  Neurological:  [ ] headache [ ] seizures  [ ] confusion/altered mental status  Psychiatric:  [ ] anxiety [ ] depression	  Hematology/Lymphatics:  [ ] lymphadenopathy  Endocrine:  [ ] adrenal [ ] thyroid  Allergic/Immunologic:	 [ ] transplant [ ] seasonal      Medications:  MEDICATIONS  (STANDING):  ALBUTerol    90 MICROgram(s) HFA Inhaler 1 Puff(s) Inhalation every 4 hours  ALBUTerol/ipratropium for Nebulization 3 milliLiter(s) Nebulizer every 6 hours  atorvastatin 20 milliGRAM(s) Oral at bedtime  chlorhexidine 2% Cloths 1 Application(s) Topical daily  cholecalciferol 400 Unit(s) Oral daily  dextrose 50% Injectable 12.5 Gram(s) IV Push once  dextrose 50% Injectable 25 Gram(s) IV Push once  dextrose 50% Injectable 25 Gram(s) IV Push once  furosemide    Tablet 40 milliGRAM(s) Oral two times a day  insulin glargine Injectable (LANTUS) 6 Unit(s) SubCutaneous at bedtime  insulin lispro (HumaLOG) corrective regimen sliding scale   SubCutaneous every 6 hours  lactated ringers. 1000 milliLiter(s) (50 mL/Hr) IV Continuous <Continuous>  loratadine 10 milliGRAM(s) Oral daily  magnesium oxide 400 milliGRAM(s) Oral three times a day with meals  magnesium sulfate  IVPB 1 Gram(s) IV Intermittent once  meropenem  IVPB 1000 milliGRAM(s) IV Intermittent every 8 hours  montelukast 10 milliGRAM(s) Oral daily  pantoprazole    Tablet 40 milliGRAM(s) Oral before breakfast  phytonadione   Solution 10 milliGRAM(s) Oral daily  sodium phosphate IVPB 15 milliMole(s) IV Intermittent once  tiotropium 18 MICROgram(s) Capsule 1 Capsule(s) Inhalation daily  tiotropium 18 MICROgram(s) Capsule 1 Capsule(s) Inhalation daily  vancomycin  IVPB 1000 milliGRAM(s) IV Intermittent every 12 hours    MEDICATIONS  (PRN):  dextrose 40% Gel 15 Gram(s) Oral once PRN Blood Glucose LESS THAN 70 milliGRAM(s)/deciliter  glucagon  Injectable 1 milliGRAM(s) IntraMuscular once PRN Glucose LESS THAN 70 milligrams/deciliter  guaiFENesin    Syrup 100 milliGRAM(s) Oral every 6 hours PRN Cough    Vital Signs Last 24 Hrs  T(C): 37.4 (16 Aug 2019 06:08), Max: 37.4 (15 Aug 2019 17:43)  T(F): 99.4 (16 Aug 2019 06:08), Max: 99.4 (15 Aug 2019 17:43)  HR: 109 (16 Aug 2019 06:08) (105 - 109)  BP: 102/64 (16 Aug 2019 06:08) (96/60 - 138/72)  BP(mean): --  RR: 20 (16 Aug 2019 06:08) (18 - 22)  SpO2: 96% (16 Aug 2019 06:08) (95% - 98%)          08-15 @ 07:01  -  08-16 @ 07:00  --------------------------------------------------------  IN: 2090 mL / OUT: 560 mL / NET: 1530 mL      LABS:                        9.9    20.3  )-----------( 278      ( 16 Aug 2019 05:08 )             31.8     08-16    137  |  101  |  40<H>  ----------------------------<  127<H>  4.5   |  23  |  1.20    Ca    8.2<L>      16 Aug 2019 05:06  Phos  2.7     08-16  Mg     1.9     08-16    TPro  7.4  /  Alb  2.5<L>  /  TBili  0.6  /  DBili  x   /  AST  16  /  ALT  10  /  AlkPhos  131<H>  08-14      PT/INR - ( 16 Aug 2019 05:06 )   PT: 21.5 sec;   INR: 1.85 ratio         PTT - ( 16 Aug 2019 05:06 )  PTT:31.4 sec      CULTURES:  Culture Results:   Growth in aerobic bottle: Gram Positive Cocci in Clusters  "Due to technical problems, Proteus sp. will Not be reported as part of  the BCID panel until further notice"  ***Blood Panel PCR results on this specimen are available  approximately 3 hours after the Gram stain result.***  Gram stain, PCR, and/or culture results may not always  correspond due to difference in methodologies.  ************************************************************  This PCR assay was performed using [employer].  The following targets are tested for: Enterococcus,  vancomycin resistant enterococci, Listeria monocytogenes,  coagulase negative staphylococci, S. aureus,  methicillin resistant S. aureus, Streptococcus agalactiae  (Group B), S. pneumoniae, S.pyogenes (Group A),  Acinetobacter baumannii, Enterobacter cloacae, E. coli,  Klebsiella oxytoca, K. pneumoniae, Proteus sp.,  Serratia marcescens, Haemophilus influenzae,  Neisseria meningitidis, Pseudomonas aeruginosa, Candida  albicans, C. glabrata, C krusei, C parapsilosis,  C. tropicalis and the KPC resistance gene. (08-14 @ 17:44)  Culture Results:   No growth to date. (08-14 @ 17:44)    Physical Examination:    General: Non toxic, No acute distress.      HEENT: Pupils equal, reactive to light.  Symmetric.    PULM: Scattered exp rhonchi, no wheeze, good aeration    CVS: Regular rate and rhythm, no murmurs, rubs, or gallops    ABD: Soft, mild diffuse tenderness; ostomly and drains noted    EXT: No edema, nontender    SKIN: Warm and well perfused, no rashes noted.    NEURO: Alert, oriented, interactive, nonfocal    RADIOLOGY REVIEWED PERSONALLY  CXR:    PROCEDURE DATE:  08/16/2019        INTERPRETATION:  CLINICAL INFORMATION: Abnormal lung sounds and wheezing.    TECHNIQUE: Portable AP radiograph of the chest.    COMPARISON: Portable AP radiograph of the chest 8/14/2019. CT abdomen   pelvis 8/14/2019    FINDINGS:    The lungs are clear. No pleural effusion or pneumothorax.    Cardiomediastinal silhouette is normal. Left chest wall 3-lead AICD.    Unremarkable skeletal structures.      IMPRESSION:     Clear lungs        CT chest:    TTE:

## 2019-08-21 NOTE — PROGRESS NOTE ADULT - ASSESSMENT
73 yo female s/p IR upsizing of drains   -flush drains BID   -monitor drain output  - Abx as per ID     Cl Sx [id number]

## 2019-08-21 NOTE — PROGRESS NOTE ADULT - ATTENDING COMMENTS
75 y/o F (Shinto) with Pmhx of HFrEF, s/p AICD, asthma, HTN, HLD, atrial fibrillation on Eliquis, with previous perforated diverticulosis, Hartmans procedure with multiple RTOR for abd washouts, closure of abdominal incision with mesh (7/11/19), returns with abd pain found to have LLQ abdominal wall collection and intraabdominal fluid collection; hematology consulted for elevated PT likely 2/2 poor nutritional status    #Elevated PT  -secondary to Vitamin K deficiency (mixing study 8/16 with correction). INR 3 on admission, s/p Vitamin K repletion x 3. now with INR 1.3 on 8/20.  -hx consistent with poor nutritional intake over the past month, had came off of TPN.    #Macrocytic anemia, Druze  -HGB 9.2 today. baseline 9-10  -try to minimize phlebotomy if possible;  use pediatric phlebotomy tubes when necessary    will sign off at this time. please page with any further questions. I agree with the fellow's note with the following addendums/exceptions  Briefly, 75 y/o F (Mandaen) with Pmhx of HFrEF, s/p AICD, asthma, HTN, HLD, atrial fibrillation on Eliquis, history of perforated diverticulosis, Hartmans procedure with multiple RTOR for abd washouts, closure of abdominal incision with mesh (7/11/19) who was admitted for worsening intrabdominal fluid collections now s/p IR drainage. pT was initially prolonged in the setting of Vitamin K deficiency likely due to poor nutritional status after stopping TPN.     #Elevated PT  -secondary to Vitamin K deficiency (mixing study 8/16 with correction) due to poor nutritional status  -INR 1.3, can continue to monitor and replace Vitamin K as needed for procedures    #Macrocytic anemia, Baptism  -hemoglobin stable   -please use pediatric tubes for blood draws    #Intra-abdominal infections  -appreciate excellent surgical/medical care   -continue drainage and antimicrobials    We will sign off at this time. please page with any further questions.    Julianna Waters MD   Attending, Hematology/Oncology  [phone]

## 2019-08-21 NOTE — DIETITIAN INITIAL EVALUATION ADULT. - ADD RECOMMEND
1. Trial of no sugar added health shakes. 2. Recommend No Carb Prosource x 2 daily. 3. Recommend Mo x 2 packets daily to provide conditionally essential amino acids for wound healing. 4. Consider multivitamin and vitamin C supplementation to aid in wound healing. 5. RD provided verbal and written material regarding Low Fiber Nutrition therapy. 1. Trial of no sugar added health shakes. 2. Recommend No Carb Prosource x 2 daily. 3. Recommend Mo x 2 packets daily to provide conditionally essential amino acids for wound healing. 4. Consider multivitamin and vitamin C supplementation to aid in wound healing. 5. RD provided verbal and written material regarding Colostomy Nutrition therapy.

## 2019-08-21 NOTE — DIETITIAN INITIAL EVALUATION ADULT. - FACTORS AFF FOOD INTAKE
pt reports decreased appetite and intake- has had a few days when she is eating well but otherwise overall doesn't seem to have the best appetite and intake-part of which daughter feels is related menu choices and texture of food; reports she wears dentures but the rehab she was at lost her well fitted dentures and currently she finds it difficult to chew most meats; noted pt S/P MBS on 7/27/19 and was recommended for dysphagia 2 c nectar consistency liquids, as per daughter, pt left on this diet but was taking thin liquids at rehab and does not recall pt having a repeat MBS; noted + colostomy function, as per flow sheets: 8/18: 150ml, 8/19: 125ml, 8/20: 150ml

## 2019-08-21 NOTE — PROGRESS NOTE ADULT - SUBJECTIVE AND OBJECTIVE BOX
Interval Events: Underwent PICC placement    S: Patient not voiding appropriately, but is otherwise doing well, denies fevers, chills, nausea, emesis, chest pain, SOB.    O: Vital Signs  T(C): 36.6 (08-21 @ 00:46), Max: 36.7 (08-20 @ 10:28)  HR: 91 (08-21 @ 00:46) (84 - 108)  BP: 120/75 (08-21 @ 00:46) (106/68 - 131/79)  RR: 18 (08-21 @ 00:46) (18 - 18)  SpO2: 100% (08-21 @ 00:46) (96% - 100%)  08-19-19 @ 07:01  -  08-20-19 @ 07:00  --------------------------------------------------------  IN: 150 mL / OUT: 570 mL / NET: -420 mL    08-20-19 @ 07:01  -  08-21-19 @ 03:51  --------------------------------------------------------  IN: 740 mL / OUT: 1027 mL / NET: -287 mL      General: alert and oriented, NAD  Resp: airway patent, respirations unlabored  CVS: regular rate and rhythm  Abd: soft, mildly tender, nondistended. Ostomy in place with scant output. Strattice in place. 10 Italian pigtail catheter placement in LLQ and 10 Italian pigtail in left retroperitoneal fluid collection.  Extremities: WWP                        9.4    16.63 )-----------( 290      ( 20 Aug 2019 13:12 )             29.9   08-20    134<L>  |  102  |  19  ----------------------------<  172<H>  4.4   |  20<L>  |  0.86    Ca    8.2<L>      20 Aug 2019 09:45  Phos  3.0     08-20  Mg     1.9     08-20

## 2019-08-21 NOTE — DIETITIAN INITIAL EVALUATION ADULT. - PERTINENT LABORATORY DATA
POCT glucose: 8/21: 94, 87, 89, 8/20: 139-193; 8/16: A1C 5.2%-indicating well controlled DM versus suboptimal intake; 8/21: phosphorus 2.1

## 2019-08-21 NOTE — CHART NOTE - NSCHARTNOTEFT_GEN_A_CORE
Upon Nutritional Assessment by the Registered Dietitian your patient was determined to meet criteria / has evidence of the following diagnosis/diagnoses:          [x]  Mild Protein Calorie Malnutrition        [ ]  Moderate Protein Calorie Malnutrition        [ ] Severe Protein Calorie Malnutrition        [ ] Unspecified Protein Calorie Malnutrition        [ ] Underweight / BMI <19        [ ] Morbid Obesity / BMI > 40      Findings as based on:  [x] Comprehensive nutrition assessment   [x] Nutrition Focused Physical Exam  [ ] Other:       Nutrition Plan/Recommendations:      1. Trial of no sugar added health shakes.   2. Recommend No Carb Prosource x 2 daily.   3. Recommend Mo x 2 packets daily to provide conditionally essential amino acids for wound healing.   4. Consider multivitamin and vitamin C supplementation to aid in wound healing.    PROVIDER Section:     By signing this assessment you are acknowledging and agree with the diagnosis/diagnoses assigned by the Registered Dietitian    Comments:

## 2019-08-21 NOTE — DIETITIAN INITIAL EVALUATION ADULT. - PHYSICAL APPEARANCE
well nourished/other (specify) Pt agreeable to nutrition focused physical exam, findings as follows: mild muscle loss around temples, some edema noted, no other overt signs of muscle or fat loss at this time.    Skin: intact, wound bundle for healed heel wound noted  Edema: +1 generalized edema, noted admit wt of about 187.6 pounds, no new wt available  ht:63" , wt: 187 pounds, BMI: 33kg/m2, IBW: 115 pounds +/- 10%

## 2019-08-21 NOTE — DIETITIAN INITIAL EVALUATION ADULT. - OTHER INFO
Diet PTA: pt reports at rehab she was eating hot cereal for breakfast and lunch and dinner would vary, preferred soups and softer foods; daughter reports pt does not add salt when eating and tries to avoid high salt foods due to h/o HF. Daughter reports rehab did not report how pt's Finger sticks were. Daughter reports not being aware of need for low fiber diet since last admission.     Pt reports NKFA. States she was on Lantus, vitamin C and vitamin D PTA. Daughter reports pt was also taking a liquid protein supplement at rehab to aide in wound healing.     Pt and daughter report usual wt is about 185-187 pounds. Daughter has noticed pt seems to be thinner in her LE but upper body seems to be the same, possibly related to prolonged hospitalization. Noted pt was on TPN PTA and then was transitioned to PO diet at the end of the admission. Daughter reports pt and daughter willing to have another swallow evaluation if needed, daughter reports pt c no coughing after liquids at this time, does report pt would prefer more chopped up meats.    Pt and daughter agreeable to starting No Carb Prosource and Mo to for wound healing. Pt declined Glucerna shakes, has tried in the past and did not like it. Pt willing to try no sugar added health shakes. Diet PTA: pt reports at rehab she was eating hot cereal for breakfast and lunch and dinner would vary, preferred soups and softer foods; daughter reports pt does not add salt when eating and tries to avoid high salt foods due to h/o HF. Daughter reports rehab did not report how pt's Finger sticks were. Daughter reports not being aware of need for low fiber diet since last admission.     Pt reports NKFA. States she was on Lantus, vitamin C and vitamin D PTA. Daughter reports pt was also taking a liquid protein supplement at rehab to aide in wound healing.     Pt and daughter report usual wt is about 185-187 pounds. Daughter has noticed pt seems to be thinner in her LE but upper body seems to be the same, possibly related to prolonged hospitalization. Noted pt was on TPN PTA and then was transitioned to PO diet at the end of the admission. Daughter reports pt and daughter willing to have another swallow evaluation if needed, daughter reports pt c no coughing after liquids at this time, does report pt would prefer more chopped up meats.    Pt and daughter agreeable to starting No Carb Prosource and Mo to for wound healing. Pt declined Glucerna shakes, has tried in the past and did not like it. Pt willing to try no sugar added health shakes. Encouraged PO intake-small, frequent meals and nutrient dense snacks. In house, encouraged pt to order nutrient dense snacks c meals to consume in between meals to mimic small, frequent meals. Discussed protein rich foods available on menu. Discussed consuming protein first at meal times and then eating the other foods.

## 2019-08-22 LAB
ANION GAP SERPL CALC-SCNC: 10 MMOL/L — SIGNIFICANT CHANGE UP (ref 5–17)
ANION GAP SERPL CALC-SCNC: 7 MMOL/L — SIGNIFICANT CHANGE UP (ref 5–17)
BUN SERPL-MCNC: 14 MG/DL — SIGNIFICANT CHANGE UP (ref 7–23)
BUN SERPL-MCNC: 15 MG/DL — SIGNIFICANT CHANGE UP (ref 7–23)
CALCIUM SERPL-MCNC: 7.5 MG/DL — LOW (ref 8.4–10.5)
CALCIUM SERPL-MCNC: 8.3 MG/DL — LOW (ref 8.4–10.5)
CHLORIDE SERPL-SCNC: 103 MMOL/L — SIGNIFICANT CHANGE UP (ref 96–108)
CHLORIDE SERPL-SCNC: 103 MMOL/L — SIGNIFICANT CHANGE UP (ref 96–108)
CO2 SERPL-SCNC: 22 MMOL/L — SIGNIFICANT CHANGE UP (ref 22–31)
CO2 SERPL-SCNC: 24 MMOL/L — SIGNIFICANT CHANGE UP (ref 22–31)
CREAT SERPL-MCNC: 0.61 MG/DL — SIGNIFICANT CHANGE UP (ref 0.5–1.3)
CREAT SERPL-MCNC: 0.64 MG/DL — SIGNIFICANT CHANGE UP (ref 0.5–1.3)
GLUCOSE SERPL-MCNC: 110 MG/DL — HIGH (ref 70–99)
GLUCOSE SERPL-MCNC: 113 MG/DL — HIGH (ref 70–99)
HCT VFR BLD CALC: 25.8 % — LOW (ref 34.5–45)
HCT VFR BLD CALC: 26.4 % — LOW (ref 34.5–45)
HGB BLD-MCNC: 7.7 G/DL — LOW (ref 11.5–15.5)
HGB BLD-MCNC: 8.4 G/DL — LOW (ref 11.5–15.5)
MAGNESIUM SERPL-MCNC: 1.8 MG/DL — SIGNIFICANT CHANGE UP (ref 1.6–2.6)
MAGNESIUM SERPL-MCNC: 1.9 MG/DL — SIGNIFICANT CHANGE UP (ref 1.6–2.6)
MCHC RBC-ENTMCNC: 29.9 GM/DL — LOW (ref 32–36)
MCHC RBC-ENTMCNC: 29.9 PG — SIGNIFICANT CHANGE UP (ref 27–34)
MCHC RBC-ENTMCNC: 31.8 GM/DL — LOW (ref 32–36)
MCHC RBC-ENTMCNC: 32 PG — SIGNIFICANT CHANGE UP (ref 27–34)
MCV RBC AUTO: 100 FL — SIGNIFICANT CHANGE UP (ref 80–100)
MCV RBC AUTO: 101 FL — HIGH (ref 80–100)
PHOSPHATE SERPL-MCNC: 2.2 MG/DL — LOW (ref 2.5–4.5)
PHOSPHATE SERPL-MCNC: 2.4 MG/DL — LOW (ref 2.5–4.5)
PLATELET # BLD AUTO: 310 K/UL — SIGNIFICANT CHANGE UP (ref 150–400)
PLATELET # BLD AUTO: SIGNIFICANT CHANGE UP (ref 150–400)
POTASSIUM SERPL-MCNC: 3.9 MMOL/L — SIGNIFICANT CHANGE UP (ref 3.5–5.3)
POTASSIUM SERPL-MCNC: 5.7 MMOL/L — HIGH (ref 3.5–5.3)
POTASSIUM SERPL-SCNC: 3.9 MMOL/L — SIGNIFICANT CHANGE UP (ref 3.5–5.3)
POTASSIUM SERPL-SCNC: 5.7 MMOL/L — HIGH (ref 3.5–5.3)
RBC # BLD: 2.58 M/UL — LOW (ref 3.8–5.2)
RBC # BLD: 2.62 M/UL — LOW (ref 3.8–5.2)
RBC # FLD: 18.9 % — HIGH (ref 10.3–14.5)
RBC # FLD: 19.3 % — HIGH (ref 10.3–14.5)
SODIUM SERPL-SCNC: 134 MMOL/L — LOW (ref 135–145)
SODIUM SERPL-SCNC: 135 MMOL/L — SIGNIFICANT CHANGE UP (ref 135–145)
WBC # BLD: 11.5 K/UL — HIGH (ref 3.8–10.5)
WBC # BLD: 9.71 K/UL — SIGNIFICANT CHANGE UP (ref 3.8–10.5)
WBC # FLD AUTO: 11.5 K/UL — HIGH (ref 3.8–10.5)
WBC # FLD AUTO: 9.71 K/UL — SIGNIFICANT CHANGE UP (ref 3.8–10.5)

## 2019-08-22 PROCEDURE — 99024 POSTOP FOLLOW-UP VISIT: CPT

## 2019-08-22 PROCEDURE — 99232 SBSQ HOSP IP/OBS MODERATE 35: CPT

## 2019-08-22 RX ORDER — ERTAPENEM SODIUM 1 G/1
1 INJECTION, POWDER, LYOPHILIZED, FOR SOLUTION INTRAMUSCULAR; INTRAVENOUS EVERY 24 HOURS
Refills: 0 | Status: DISCONTINUED | OUTPATIENT
Start: 2019-08-22 | End: 2019-08-27

## 2019-08-22 RX ADMIN — BUDESONIDE AND FORMOTEROL FUMARATE DIHYDRATE 2 PUFF(S): 160; 4.5 AEROSOL RESPIRATORY (INHALATION) at 06:24

## 2019-08-22 RX ADMIN — LORATADINE 10 MILLIGRAM(S): 10 TABLET ORAL at 12:32

## 2019-08-22 RX ADMIN — APIXABAN 5 MILLIGRAM(S): 2.5 TABLET, FILM COATED ORAL at 06:24

## 2019-08-22 RX ADMIN — LEVALBUTEROL 0.63 MILLIGRAM(S): 1.25 SOLUTION, CONCENTRATE RESPIRATORY (INHALATION) at 06:25

## 2019-08-22 RX ADMIN — Medication 400 UNIT(S): at 12:32

## 2019-08-22 RX ADMIN — TIOTROPIUM BROMIDE 1 CAPSULE(S): 18 CAPSULE ORAL; RESPIRATORY (INHALATION) at 12:32

## 2019-08-22 RX ADMIN — Medication 62.5 MILLIMOLE(S): at 12:38

## 2019-08-22 RX ADMIN — LEVALBUTEROL 0.63 MILLIGRAM(S): 1.25 SOLUTION, CONCENTRATE RESPIRATORY (INHALATION) at 17:58

## 2019-08-22 RX ADMIN — BUDESONIDE AND FORMOTEROL FUMARATE DIHYDRATE 2 PUFF(S): 160; 4.5 AEROSOL RESPIRATORY (INHALATION) at 17:58

## 2019-08-22 RX ADMIN — INSULIN GLARGINE 6 UNIT(S): 100 INJECTION, SOLUTION SUBCUTANEOUS at 21:51

## 2019-08-22 RX ADMIN — LEVALBUTEROL 0.63 MILLIGRAM(S): 1.25 SOLUTION, CONCENTRATE RESPIRATORY (INHALATION) at 00:53

## 2019-08-22 RX ADMIN — MONTELUKAST 10 MILLIGRAM(S): 4 TABLET, CHEWABLE ORAL at 12:32

## 2019-08-22 RX ADMIN — MAGNESIUM OXIDE 400 MG ORAL TABLET 400 MILLIGRAM(S): 241.3 TABLET ORAL at 17:58

## 2019-08-22 RX ADMIN — Medication 1: at 17:58

## 2019-08-22 RX ADMIN — ATORVASTATIN CALCIUM 20 MILLIGRAM(S): 80 TABLET, FILM COATED ORAL at 21:51

## 2019-08-22 RX ADMIN — CHLORHEXIDINE GLUCONATE 1 APPLICATION(S): 213 SOLUTION TOPICAL at 12:30

## 2019-08-22 RX ADMIN — ERTAPENEM SODIUM 100.02 MILLIGRAM(S): 1 INJECTION, POWDER, LYOPHILIZED, FOR SOLUTION INTRAMUSCULAR; INTRAVENOUS at 12:27

## 2019-08-22 RX ADMIN — LEVALBUTEROL 0.63 MILLIGRAM(S): 1.25 SOLUTION, CONCENTRATE RESPIRATORY (INHALATION) at 12:32

## 2019-08-22 RX ADMIN — PANTOPRAZOLE SODIUM 40 MILLIGRAM(S): 20 TABLET, DELAYED RELEASE ORAL at 06:25

## 2019-08-22 RX ADMIN — MAGNESIUM OXIDE 400 MG ORAL TABLET 400 MILLIGRAM(S): 241.3 TABLET ORAL at 08:31

## 2019-08-22 RX ADMIN — APIXABAN 5 MILLIGRAM(S): 2.5 TABLET, FILM COATED ORAL at 17:58

## 2019-08-22 RX ADMIN — MAGNESIUM OXIDE 400 MG ORAL TABLET 400 MILLIGRAM(S): 241.3 TABLET ORAL at 12:32

## 2019-08-22 NOTE — PROGRESS NOTE ADULT - ASSESSMENT
COPD with chronic persistent asthma: patient appears stable at this time with normal RA sats (96%) and comfortable breathing supine. She has scattered rhonchi on exam, yet no wheezing and good aeration. CXR is clear.  Moderate MS and Mild AS on recent TTE and moderate systolic CHF: patient does not appear to be in decompensated CHF at this time; there is no evidence of pulmonary edema radiographically or on physical exam, although cannot exclude interstitial edema. Mild tachycardia likely secondary to intrabdominal infection, albuterol HFA could contribute. Of note, patient is currently on bid lasix and LR at 50 /hour    REC    Continue duoneb qid  Symbicort 80/ bid  DC planning primary team

## 2019-08-22 NOTE — PROGRESS NOTE ADULT - SUBJECTIVE AND OBJECTIVE BOX
Afebrile on ertapenum  Breathing comfortably OOB in chair    Vital Signs Last 24 Hrs  T(C): 36.5 (22 Aug 2019 09:14), Max: 36.7 (21 Aug 2019 22:23)  T(F): 97.7 (22 Aug 2019 09:14), Max: 98 (21 Aug 2019 22:23)  HR: 89 (22 Aug 2019 09:14) (86 - 93)  BP: 130/97 (22 Aug 2019 09:14) (118/77 - 143/78)  BP(mean): --  RR: 18 (22 Aug 2019 09:14) (18 - 18)  SpO2: 100% (22 Aug 2019 09:14) (96% - 100%)                          8.4    11.5  )-----------( 310      ( 22 Aug 2019 09:27 )             26.4       08-22    135  |  103  |  14  ----------------------------<  110<H>  3.9   |  22  |  0.64    Ca    8.3<L>      22 Aug 2019 09:27  Phos  2.2     08-22  Mg     1.8     08-22        PAST MEDICAL & SURGICAL HISTORY:  Refusal of blood transfusions as patient is Sabianism  Patient is Sabianism  Vertigo  Atrial flutter  Diverticulitis  Cardiomyopathy  Kidney stone  Cardiac Pacemaker  HTN - Hypertension  Gout  Diabetes  Congestive Heart Failure  Asthma  S/P cholecystectomy  AICD (Automatic Cardioverter/Defibrillator) Present: inserted in Aug, 2008. Due for battery change in 1 month. ( [employer]) . Inserted by Dr Duffy    Allergies    Coreg (Other)  digoxin (Other; Short breath (Mild to Mod))  IV Contrast (Unknown)  penicillins (Hives)    Intolerances    metoprolol (Other)  Solu-Medrol (Other (Mild to Mod))    FAMILY HISTORY:  Family history of brain tumor    SH: Lives in same house as daughter: fully indeplendent; non smoker  REVIEW OF SYSTEMS  [  ] ROS unobtainable because:    [  ] All other systems negative except as noted below:	    Constitutional:  [ ] fever [ ] chills  [ ] weight loss  [ ] weakness  Skin:  [ ] rash [ ] phlebitis	  Eyes: [ ] icterus [ ] pain  [ ] discharge	  ENMT: [ ] sore throat  [ ] thrush [ ] ulcers [ ] exudates  Respiratory: [ ] dyspnea [ ] hemoptysis [ ] cough [ ] sputum	  Cardiovascular:  [ ] chest pain [ ] palpitations [ ] edema	  Gastrointestinal:  [ ] nausea [ ] vomiting [ ] diarrhea [ ] constipation [ ] pain	  Genitourinary:  [ ] dysuria [ ] frequency [ ] hematuria [ ] discharge [ ] flank pain  [ ] incontinence  Musculoskeletal:  [ ] myalgias [ ] arthralgias [ ] arthritis  [ ] back pain  Neurological:  [ ] headache [ ] seizures  [ ] confusion/altered mental status  Psychiatric:  [ ] anxiety [ ] depression	  Hematology/Lymphatics:  [ ] lymphadenopathy  Endocrine:  [ ] adrenal [ ] thyroid  Allergic/Immunologic:	 [ ] transplant [ ] seasonal      Medications:  MEDICATIONS  (STANDING):  ALBUTerol    90 MICROgram(s) HFA Inhaler 1 Puff(s) Inhalation every 4 hours  ALBUTerol/ipratropium for Nebulization 3 milliLiter(s) Nebulizer every 6 hours  atorvastatin 20 milliGRAM(s) Oral at bedtime  chlorhexidine 2% Cloths 1 Application(s) Topical daily  cholecalciferol 400 Unit(s) Oral daily  dextrose 50% Injectable 12.5 Gram(s) IV Push once  dextrose 50% Injectable 25 Gram(s) IV Push once  dextrose 50% Injectable 25 Gram(s) IV Push once  furosemide    Tablet 40 milliGRAM(s) Oral two times a day  insulin glargine Injectable (LANTUS) 6 Unit(s) SubCutaneous at bedtime  insulin lispro (HumaLOG) corrective regimen sliding scale   SubCutaneous every 6 hours  lactated ringers. 1000 milliLiter(s) (50 mL/Hr) IV Continuous <Continuous>  loratadine 10 milliGRAM(s) Oral daily  magnesium oxide 400 milliGRAM(s) Oral three times a day with meals  magnesium sulfate  IVPB 1 Gram(s) IV Intermittent once  meropenem  IVPB 1000 milliGRAM(s) IV Intermittent every 8 hours  montelukast 10 milliGRAM(s) Oral daily  pantoprazole    Tablet 40 milliGRAM(s) Oral before breakfast  phytonadione   Solution 10 milliGRAM(s) Oral daily  sodium phosphate IVPB 15 milliMole(s) IV Intermittent once  tiotropium 18 MICROgram(s) Capsule 1 Capsule(s) Inhalation daily  tiotropium 18 MICROgram(s) Capsule 1 Capsule(s) Inhalation daily  vancomycin  IVPB 1000 milliGRAM(s) IV Intermittent every 12 hours    MEDICATIONS  (PRN):  dextrose 40% Gel 15 Gram(s) Oral once PRN Blood Glucose LESS THAN 70 milliGRAM(s)/deciliter  glucagon  Injectable 1 milliGRAM(s) IntraMuscular once PRN Glucose LESS THAN 70 milligrams/deciliter  guaiFENesin    Syrup 100 milliGRAM(s) Oral every 6 hours PRN Cough    Vital Signs Last 24 Hrs  T(C): 37.4 (16 Aug 2019 06:08), Max: 37.4 (15 Aug 2019 17:43)  T(F): 99.4 (16 Aug 2019 06:08), Max: 99.4 (15 Aug 2019 17:43)  HR: 109 (16 Aug 2019 06:08) (105 - 109)  BP: 102/64 (16 Aug 2019 06:08) (96/60 - 138/72)  BP(mean): --  RR: 20 (16 Aug 2019 06:08) (18 - 22)  SpO2: 96% (16 Aug 2019 06:08) (95% - 98%)          08-15 @ 07:01  -  08-16 @ 07:00  --------------------------------------------------------  IN: 2090 mL / OUT: 560 mL / NET: 1530 mL      LABS:                        9.9    20.3  )-----------( 278      ( 16 Aug 2019 05:08 )             31.8     08-16    137  |  101  |  40<H>  ----------------------------<  127<H>  4.5   |  23  |  1.20    Ca    8.2<L>      16 Aug 2019 05:06  Phos  2.7     08-16  Mg     1.9     08-16    TPro  7.4  /  Alb  2.5<L>  /  TBili  0.6  /  DBili  x   /  AST  16  /  ALT  10  /  AlkPhos  131<H>  08-14      PT/INR - ( 16 Aug 2019 05:06 )   PT: 21.5 sec;   INR: 1.85 ratio         PTT - ( 16 Aug 2019 05:06 )  PTT:31.4 sec      CULTURES:  Culture Results:   Growth in aerobic bottle: Gram Positive Cocci in Clusters  "Due to technical problems, Proteus sp. will Not be reported as part of  the BCID panel until further notice"  ***Blood Panel PCR results on this specimen are available  approximately 3 hours after the Gram stain result.***  Gram stain, PCR, and/or culture results may not always  correspond due to difference in methodologies.  ************************************************************  This PCR assay was performed using [employer].  The following targets are tested for: Enterococcus,  vancomycin resistant enterococci, Listeria monocytogenes,  coagulase negative staphylococci, S. aureus,  methicillin resistant S. aureus, Streptococcus agalactiae  (Group B), S. pneumoniae, S.pyogenes (Group A),  Acinetobacter baumannii, Enterobacter cloacae, E. coli,  Klebsiella oxytoca, K. pneumoniae, Proteus sp.,  Serratia marcescens, Haemophilus influenzae,  Neisseria meningitidis, Pseudomonas aeruginosa, Candida  albicans, C. glabrata, C krusei, C parapsilosis,  C. tropicalis and the KPC resistance gene. (08-14 @ 17:44)  Culture Results:   No growth to date. (08-14 @ 17:44)    Physical Examination:    General: Non toxic, No acute distress.      HEENT: Pupils equal, reactive to light.  Symmetric.    PULM: Scattered exp rhonchi, no wheeze, good aeration    CVS: Regular rate and rhythm, no murmurs, rubs, or gallops    ABD: Soft, mild diffuse tenderness; ostomly and drains noted    EXT: No edema, nontender    SKIN: Warm and well perfused, no rashes noted.    NEURO: Alert, oriented, interactive, nonfocal    RADIOLOGY REVIEWED PERSONALLY  CXR:    PROCEDURE DATE:  08/16/2019        INTERPRETATION:  CLINICAL INFORMATION: Abnormal lung sounds and wheezing.    TECHNIQUE: Portable AP radiograph of the chest.    COMPARISON: Portable AP radiograph of the chest 8/14/2019. CT abdomen   pelvis 8/14/2019    FINDINGS:    The lungs are clear. No pleural effusion or pneumothorax.    Cardiomediastinal silhouette is normal. Left chest wall 3-lead AICD.    Unremarkable skeletal structures.      IMPRESSION:     Clear lungs        CT chest:    TTE:

## 2019-08-22 NOTE — PROGRESS NOTE ADULT - SUBJECTIVE AND OBJECTIVE BOX
Interval Events: Underwent tube upsize by IR    S: Patient doing well, denies fevers, chills, nausea, emesis, chest pain, SOB.    O: Vital Signs  T(C): 36.6 (08-22 @ 01:30), Max: 36.8 (08-21 @ 11:28)  HR: 93 (08-22 @ 01:30) (85 - 93)  BP: 122/73 (08-22 @ 01:30) (113/72 - 143/78)  RR: 18 (08-22 @ 01:30) (18 - 20)  SpO2: 97% (08-22 @ 01:30) (96% - 99%)  08-20-19 @ 07:01  -  08-21-19 @ 07:00  --------------------------------------------------------  IN: 1090 mL / OUT: 1177 mL / NET: -87 mL    08-21-19 @ 07:01  -  08-22-19 @ 04:32  --------------------------------------------------------  IN: 760 mL / OUT: 1165 mL / NET: -405 mL      General: alert and oriented, NAD  Resp: airway patent, respirations unlabored  CVS: regular rate and rhythm  Abd: soft, mildly tender, nondistended. Ostomy in place with scant output. Strattice in place. 10 [place]ian pigtail catheter placement in LLQ and 10 Bah[place]ian pigtail in left retroperitoneal fluid  Extremities: no edema  Skin: warm, dry, appropriate color                          9.2    13.10 )-----------( 264      ( 21 Aug 2019 09:02 )             30.7   08-21    137  |  107  |  15  ----------------------------<  77  4.1   |  22  |  0.83    Ca    7.4<L>      21 Aug 2019 06:31  Phos  2.1     08-21  Mg     1.7     08-21

## 2019-08-22 NOTE — ANESTHESIA FOLLOW-UP NOTE - NSCONDITION_GEN_ALL_CORE
Stable
I have personally performed a face to face diagnostic evaluation on this patient. I have reviewed the ACP note and agree with the history, exam and plan of care, except as noted.

## 2019-08-22 NOTE — PROGRESS NOTE ADULT - SUBJECTIVE AND OBJECTIVE BOX
Follow Up:  Intraabdominal Abscess    Interval History: No chills or fevers overnight. No N/V. No cough    REVIEW OF SYSTEMS  [  ] ROS unobtainable because:    [x  ] All other systems negative except as noted below    Constitutional:  [ ] fever [ ] chills  [ ] weight loss  [ ] weakness  Skin:  [ ] rash [ ] phlebitis	  Eyes: [ ] icterus [ ] pain  [ ] discharge	  ENMT: [ ] sore throat  [ ] thrush [ ] ulcers [ ] exudates  Respiratory: [ ] dyspnea [ ] hemoptysis [ ] cough [ ] sputum	  Cardiovascular:  [ ] chest pain [ ] palpitations [ ] edema	  Gastrointestinal:  [ ] nausea [ ] vomiting [ ] diarrhea [ ] constipation [ x] pain	  Genitourinary:  [ ] dysuria [ ] frequency [ ] hematuria [ ] discharge [ ] flank pain  [ ] incontinence  Musculoskeletal:  [ ] myalgias [ ] arthralgias [ ] arthritis  [ ] back pain  Neurological:  [ ] headache [ ] seizures  [ ] confusion/altered mental status    Allergies  Coreg (Other)  digoxin (Other; Short breath (Mild to Mod))  IV Contrast (Unknown)  penicillins (Hives)        ANTIMICROBIALS:  ertapenem  IVPB 1 every 24 hours      OTHER MEDS:  MEDICATIONS  (STANDING):  apixaban 5 every 12 hours  atorvastatin 20 at bedtime  buDESOnide  80 MICROgram(s)/formoterol 4.5 MICROgram(s) Inhaler 2 two times a day  dextrose 40% Gel 15 once PRN  dextrose 50% Injectable 12.5 once  dextrose 50% Injectable 25 once  dextrose 50% Injectable 25 once  glucagon  Injectable 1 once PRN  insulin glargine Injectable (LANTUS) 6 at bedtime  insulin lispro (HumaLOG) corrective regimen sliding scale  three times a day with meals  insulin lispro (HumaLOG) corrective regimen sliding scale  at bedtime  levalbuterol Inhalation 0.63 every 6 hours  loratadine 10 daily  montelukast 10 daily  pantoprazole    Tablet 40 before breakfast  tiotropium 18 MICROgram(s) Capsule 1 daily  tiotropium 18 MICROgram(s) Capsule 1 daily      Vital Signs Last 24 Hrs  T(C): 36.7 (22 Aug 2019 14:54), Max: 36.7 (21 Aug 2019 22:23)  T(F): 98 (22 Aug 2019 14:54), Max: 98 (21 Aug 2019 22:23)  HR: 91 (22 Aug 2019 14:54) (86 - 93)  BP: 116/70 (22 Aug 2019 14:54) (116/70 - 143/78)  BP(mean): --  RR: 18 (22 Aug 2019 14:54) (18 - 18)  SpO2: 100% (22 Aug 2019 14:54) (96% - 100%)    PHYSICAL EXAMINATION:  General: Alert and Awake, NAD  HEENT: PERRL, EOMI, No subconjunctival hemorrhages, Oropharynx Clear, MMM  Neck: Supple, No SARAHI  Cardiac: RRR, No M/R/G  Resp: CTAB, No Wh/Rh/Ra  Abdomen: +midline abdominal wound with mesh and some surrounding foul smelling drainage. 2 LLQ drains with mixed blood and brown liquid. NBS, ND, Mild diffuse tenderness to palpation, No HSM, No rigidity or guarding  MSK: Trace LE edema. No stigmata of IE. No evidence of phlebitis. No evidence of synovitis.  : External female urinary catheter  Skin: No rashes or lesions. Skin is warm and dry to the touch.   Neuro: Alert and Awake. CN 2-12 Grossly intact. Moves all four extremities spontaneously.  Psych: Calm, Pleasant, Cooperative                              8.4    11.5  )-----------( 310      ( 22 Aug 2019 09:27 )             26.4       08-22    135  |  103  |  14  ----------------------------<  110<H>  3.9   |  22  |  0.64    Ca    8.3<L>      22 Aug 2019 09:27  Phos  2.2     08-22  Mg     1.8     08-22            MICROBIOLOGY:  v  .Body Fluid right abdomen wall  08-16-19   No growth at 5 days  --    Few polymorphonuclear leukocytes per low power field  No organisms seen per oil power field      .Body Fluid left retroperitoneal  08-16-19   Numerous Escherichia coli  Numerous Alpha hemolytic strep "Susceptibilities not performed"  Moderate Bacteroides thetaiotaomicron "Susceptibilities not performed"  Few Enterococcus faecalis  --  Escherichia coli  Enterococcus faecalis      .Blood  08-16-19   No growth at 5 days.  --  --      .Blood  08-14-19   Growth in aerobic bottle: Coag Negative Staphylococcus  Single set isolate, possible contaminant. Contact  Microbiology if susceptibility testing clinically  indicated.  "Due to technical problems, Proteus sp. will Not be reported as part of  the BCID panel until further notice"  ***Blood Panel PCR results on this specimen are available  approximately 3 hours after the Gram stain result.***  Gram stain, PCR, and/or culture results may not always  correspond due to difference in methodologies.  ************************************************************  This PCR assay was performed using [employer].  The following targets are tested for: Enterococcus,  vancomycin resistant enterococci, Listeria monocytogenes,  coagulase negative staphylococci, S. aureus,  methicillin resistant S. aureus, Streptococcus agalactiae  (Group B), S. pneumoniae, S. pyogenes (Group A),  Acinetobacter baumannii, Enterobacter cloacae, E. coli,  Klebsiella oxytoca, K. pneumoniae, Proteus sp.,  Serratia marcescens, Haemophilus influenzae,  Neisseria meningitidis, Pseudomonas aeruginosa, Candida  albicans, C. glabrata, C krusei, C parapsilosis,  C. tropicalis and the KPC resistance gene.  --  Blood Culture PCR      .Sputum  07-29-19   Moderate Pseudomonas aeruginosa (Carbapenem Resistant) #2  Normal Respiratory Liz present  --  Pseudomonas aeruginosa (Carbapenem Resistant)      .Urine  07-24-19   No growth  --  --    RADIOLOGY:    EXAM:  XR CHEST PORTABLE URGENT 1V                        PROCEDURE DATE:  08/20/2019    Right upper extremity PICC with tip in the SVC. Bilateral pleural effusions.    EXAM:  CT ABDOMEN AND PELVIS OC IC                        PROCEDURE DATE:  08/19/2019   Status post left hemicolectomy and right lower quadrant ostomy, with   large ventral abdominal wall hernia.     Percutaneous drainage catheters have been placed within the left lower   ventral abdominal wall collection, and left paracolic gutter collection.   Within the collections, there is extraluminal contrast consistent with   dehiscence of the sigmoid colon stump.    Minimal decrease in right ventral wall fluid collection.

## 2019-08-22 NOTE — PROGRESS NOTE ADULT - ASSESSMENT
74 year old female PMH of HFrEF, s/p AICD, asthma, HTN, HLD, atrial fibrillation on eliquis, with previous perforated diverticulosis, Hartmans procedure with multiple RTOR for abd washouts. Admitted 6/30 - 8/2 for feculent peritonitis with septic shock secondary to perforated diverticulitis with hospital course c/b HCAP 2/2 CR Pseudomonas.    CT A/P 8/14 with new left lower ventral abdominal wall air-containing fluid collection with intraperitoneal communication to the level of the sigmoid colon stump and Additional enlargement of the left paracolic gutter collection.     Repeat CT A/P 8/19 with extraluminal contrast consistent with dehiscence of the sigmoid colon stump. IR Drain Cultures with E. coli, AHS and Bacteroides.     Discussed patient's case with radiology on 8/20. Fistula from stump with connection to abscess cavity. Would be high risk for surgery at this time and would not likely heal the stump if operated on currently. Tentative plan for reoperation in 2-4 weeks based on followup imaging.     Overall, Intraabdominal abscess with sigmoid colon stump dehiscence, Leukocytosis    #Intraabdominal Abscesses and Sigmoid Stump Dehiscence  --Stop Meropenem - start Ertapenem 1g IV Q24H  --On discharge would write for 4 week script of ertapenem  --Recommend CT A/P with IV and PO/Rectal Contrast in 2 weeks from last scan (~9/2/19)  --Recommend CBC with Diff and CMP qWeekly while on antimicrobials. Please have results faxed to [phone]  --Patient was advised to follow up with me in the Infectious Diseases Office in 2 weeks time. Patient was provided with my office contact number of [phone]    CoNS on BCx - likely procurement contaminant     #Leukocytosis - related to intraabdominal abscess  --Continue antibiotics as above  --Continue to trend WBC    I will continue to follow. Please feel free to contact me with any further questions.    Rex Goode M.D.  [facility] Division of Infectious Disease  8AM-5PM: Pager Number [phone]  After Hours (or if no response): Please contact the Infectious Diseases Office at [phone]

## 2019-08-23 LAB
ANION GAP SERPL CALC-SCNC: 9 MMOL/L — SIGNIFICANT CHANGE UP (ref 5–17)
BUN SERPL-MCNC: 11 MG/DL — SIGNIFICANT CHANGE UP (ref 7–23)
CALCIUM SERPL-MCNC: 7.6 MG/DL — LOW (ref 8.4–10.5)
CHLORIDE SERPL-SCNC: 103 MMOL/L — SIGNIFICANT CHANGE UP (ref 96–108)
CO2 SERPL-SCNC: 24 MMOL/L — SIGNIFICANT CHANGE UP (ref 22–31)
CREAT SERPL-MCNC: 0.51 MG/DL — SIGNIFICANT CHANGE UP (ref 0.5–1.3)
GLUCOSE SERPL-MCNC: 68 MG/DL — LOW (ref 70–99)
HCT VFR BLD CALC: 24.1 % — LOW (ref 34.5–45)
HGB BLD-MCNC: 7.6 G/DL — LOW (ref 11.5–15.5)
MAGNESIUM SERPL-MCNC: 1.6 MG/DL — SIGNIFICANT CHANGE UP (ref 1.6–2.6)
MCHC RBC-ENTMCNC: 30.6 PG — SIGNIFICANT CHANGE UP (ref 27–34)
MCHC RBC-ENTMCNC: 31.5 GM/DL — LOW (ref 32–36)
MCV RBC AUTO: 97.2 FL — SIGNIFICANT CHANGE UP (ref 80–100)
PHOSPHATE SERPL-MCNC: 2.6 MG/DL — SIGNIFICANT CHANGE UP (ref 2.5–4.5)
PLATELET # BLD AUTO: 278 K/UL — SIGNIFICANT CHANGE UP (ref 150–400)
POTASSIUM SERPL-MCNC: 3.7 MMOL/L — SIGNIFICANT CHANGE UP (ref 3.5–5.3)
POTASSIUM SERPL-SCNC: 3.7 MMOL/L — SIGNIFICANT CHANGE UP (ref 3.5–5.3)
RBC # BLD: 2.48 M/UL — LOW (ref 3.8–5.2)
RBC # FLD: 20.5 % — HIGH (ref 10.3–14.5)
SODIUM SERPL-SCNC: 136 MMOL/L — SIGNIFICANT CHANGE UP (ref 135–145)
WBC # BLD: 12.2 K/UL — HIGH (ref 3.8–10.5)
WBC # FLD AUTO: 12.2 K/UL — HIGH (ref 3.8–10.5)

## 2019-08-23 PROCEDURE — 99232 SBSQ HOSP IP/OBS MODERATE 35: CPT

## 2019-08-23 PROCEDURE — 99024 POSTOP FOLLOW-UP VISIT: CPT

## 2019-08-23 RX ORDER — MAGNESIUM SULFATE 500 MG/ML
2 VIAL (ML) INJECTION ONCE
Refills: 0 | Status: COMPLETED | OUTPATIENT
Start: 2019-08-23 | End: 2019-08-23

## 2019-08-23 RX ORDER — SODIUM,POTASSIUM PHOSPHATES 278-250MG
1 POWDER IN PACKET (EA) ORAL ONCE
Refills: 0 | Status: COMPLETED | OUTPATIENT
Start: 2019-08-23 | End: 2019-08-23

## 2019-08-23 RX ADMIN — APIXABAN 5 MILLIGRAM(S): 2.5 TABLET, FILM COATED ORAL at 19:00

## 2019-08-23 RX ADMIN — LORATADINE 10 MILLIGRAM(S): 10 TABLET ORAL at 11:21

## 2019-08-23 RX ADMIN — LEVALBUTEROL 0.63 MILLIGRAM(S): 1.25 SOLUTION, CONCENTRATE RESPIRATORY (INHALATION) at 13:19

## 2019-08-23 RX ADMIN — ATORVASTATIN CALCIUM 20 MILLIGRAM(S): 80 TABLET, FILM COATED ORAL at 20:57

## 2019-08-23 RX ADMIN — BUDESONIDE AND FORMOTEROL FUMARATE DIHYDRATE 2 PUFF(S): 160; 4.5 AEROSOL RESPIRATORY (INHALATION) at 05:09

## 2019-08-23 RX ADMIN — INSULIN GLARGINE 6 UNIT(S): 100 INJECTION, SOLUTION SUBCUTANEOUS at 21:56

## 2019-08-23 RX ADMIN — LEVALBUTEROL 0.63 MILLIGRAM(S): 1.25 SOLUTION, CONCENTRATE RESPIRATORY (INHALATION) at 19:00

## 2019-08-23 RX ADMIN — MAGNESIUM OXIDE 400 MG ORAL TABLET 400 MILLIGRAM(S): 241.3 TABLET ORAL at 08:19

## 2019-08-23 RX ADMIN — Medication 50 GRAM(S): at 11:06

## 2019-08-23 RX ADMIN — Medication 400 UNIT(S): at 18:59

## 2019-08-23 RX ADMIN — LEVALBUTEROL 0.63 MILLIGRAM(S): 1.25 SOLUTION, CONCENTRATE RESPIRATORY (INHALATION) at 00:27

## 2019-08-23 RX ADMIN — BUDESONIDE AND FORMOTEROL FUMARATE DIHYDRATE 2 PUFF(S): 160; 4.5 AEROSOL RESPIRATORY (INHALATION) at 19:00

## 2019-08-23 RX ADMIN — LEVALBUTEROL 0.63 MILLIGRAM(S): 1.25 SOLUTION, CONCENTRATE RESPIRATORY (INHALATION) at 05:09

## 2019-08-23 RX ADMIN — TIOTROPIUM BROMIDE 1 CAPSULE(S): 18 CAPSULE ORAL; RESPIRATORY (INHALATION) at 13:19

## 2019-08-23 RX ADMIN — ERTAPENEM SODIUM 100.02 MILLIGRAM(S): 1 INJECTION, POWDER, LYOPHILIZED, FOR SOLUTION INTRAMUSCULAR; INTRAVENOUS at 13:27

## 2019-08-23 RX ADMIN — MONTELUKAST 10 MILLIGRAM(S): 4 TABLET, CHEWABLE ORAL at 13:19

## 2019-08-23 RX ADMIN — MAGNESIUM OXIDE 400 MG ORAL TABLET 400 MILLIGRAM(S): 241.3 TABLET ORAL at 19:00

## 2019-08-23 RX ADMIN — MAGNESIUM OXIDE 400 MG ORAL TABLET 400 MILLIGRAM(S): 241.3 TABLET ORAL at 13:19

## 2019-08-23 RX ADMIN — Medication 1 PACKET(S): at 11:23

## 2019-08-23 RX ADMIN — PANTOPRAZOLE SODIUM 40 MILLIGRAM(S): 20 TABLET, DELAYED RELEASE ORAL at 05:09

## 2019-08-23 RX ADMIN — CHLORHEXIDINE GLUCONATE 1 APPLICATION(S): 213 SOLUTION TOPICAL at 11:06

## 2019-08-23 RX ADMIN — APIXABAN 5 MILLIGRAM(S): 2.5 TABLET, FILM COATED ORAL at 05:09

## 2019-08-23 NOTE — PROGRESS NOTE ADULT - ASSESSMENT
COPD with chronic persistent asthma: patient appears stable at this time with normal RA sats (96%) and comfortable breathing supine. She has scattered rhonchi on exam, yet no wheezing and good aeration. CXR is clear.  Moderate MS and Mild AS on recent TTE and moderate systolic CHF: patient does not appear to be in decompensated CHF at this time; there is no evidence of pulmonary edema radiographically or on physical exam, although cannot exclude interstitial edema. Mild tachycardia likely secondary to intrabdominal infection, albuterol HFA could contribute. Of note, patient is currently on bid lasix and LR at 50 /hour    REC    Continue duoneb qid  Symbicort 80/ bid  Continue abx per ID  DC planning primary team

## 2019-08-23 NOTE — PROGRESS NOTE ADULT - SUBJECTIVE AND OBJECTIVE BOX
Afebrile on ertapenum  Breathing comfortably OOB in chair    Afebrile on ertapenum: breathing comfortably supine in bed  c/o cough      Vital Signs Last 24 Hrs  T(C): 36.7 (23 Aug 2019 17:22), Max: 36.9 (23 Aug 2019 01:39)  T(F): 98 (23 Aug 2019 17:22), Max: 98.5 (23 Aug 2019 01:39)  HR: 90 (23 Aug 2019 17:22) (90 - 101)  BP: 103/66 (23 Aug 2019 17:22) (103/66 - 142/77)  BP(mean): --  RR: 18 (23 Aug 2019 17:22) (18 - 18)  SpO2: 100% (23 Aug 2019 17:22) (97% - 100%)                          7.6    12.20 )-----------( 278      ( 23 Aug 2019 08:45 )             24.1       08-23    136  |  103  |  11  ----------------------------<  68<L>  3.7   |  24  |  0.51    Ca    7.6<L>      23 Aug 2019 06:43  Phos  2.6     08-23  Mg     1.6     08-23          PAST MEDICAL & SURGICAL HISTORY:  Refusal of blood transfusions as patient is Druze  Patient is Druze  Vertigo  Atrial flutter  Diverticulitis  Cardiomyopathy  Kidney stone  Cardiac Pacemaker  HTN - Hypertension  Gout  Diabetes  Congestive Heart Failure  Asthma  S/P cholecystectomy  AICD (Automatic Cardioverter/Defibrillator) Present: inserted in Aug, 2008. Due for battery change in 1 month. ( [employer]) . Inserted by Dr Duffy    Allergies    Coreg (Other)  digoxin (Other; Short breath (Mild to Mod))  IV Contrast (Unknown)  penicillins (Hives)    Intolerances    metoprolol (Other)  Solu-Medrol (Other (Mild to Mod))    FAMILY HISTORY:  Family history of brain tumor    SH: Lives in same house as daughter: fully indeplendent; non smoker  REVIEW OF SYSTEMS  [  ] ROS unobtainable because:    [  ] All other systems negative except as noted below:	    Constitutional:  [ ] fever [ ] chills  [ ] weight loss  [ ] weakness  Skin:  [ ] rash [ ] phlebitis	  Eyes: [ ] icterus [ ] pain  [ ] discharge	  ENMT: [ ] sore throat  [ ] thrush [ ] ulcers [ ] exudates  Respiratory: [ ] dyspnea [ ] hemoptysis [ ] cough [ ] sputum	  Cardiovascular:  [ ] chest pain [ ] palpitations [ ] edema	  Gastrointestinal:  [ ] nausea [ ] vomiting [ ] diarrhea [ ] constipation [ ] pain	  Genitourinary:  [ ] dysuria [ ] frequency [ ] hematuria [ ] discharge [ ] flank pain  [ ] incontinence  Musculoskeletal:  [ ] myalgias [ ] arthralgias [ ] arthritis  [ ] back pain  Neurological:  [ ] headache [ ] seizures  [ ] confusion/altered mental status  Psychiatric:  [ ] anxiety [ ] depression	  Hematology/Lymphatics:  [ ] lymphadenopathy  Endocrine:  [ ] adrenal [ ] thyroid  Allergic/Immunologic:	 [ ] transplant [ ] seasonal      Medications:  MEDICATIONS  (STANDING):  ALBUTerol    90 MICROgram(s) HFA Inhaler 1 Puff(s) Inhalation every 4 hours  ALBUTerol/ipratropium for Nebulization 3 milliLiter(s) Nebulizer every 6 hours  atorvastatin 20 milliGRAM(s) Oral at bedtime  chlorhexidine 2% Cloths 1 Application(s) Topical daily  cholecalciferol 400 Unit(s) Oral daily  dextrose 50% Injectable 12.5 Gram(s) IV Push once  dextrose 50% Injectable 25 Gram(s) IV Push once  dextrose 50% Injectable 25 Gram(s) IV Push once  furosemide    Tablet 40 milliGRAM(s) Oral two times a day  insulin glargine Injectable (LANTUS) 6 Unit(s) SubCutaneous at bedtime  insulin lispro (HumaLOG) corrective regimen sliding scale   SubCutaneous every 6 hours  lactated ringers. 1000 milliLiter(s) (50 mL/Hr) IV Continuous <Continuous>  loratadine 10 milliGRAM(s) Oral daily  magnesium oxide 400 milliGRAM(s) Oral three times a day with meals  magnesium sulfate  IVPB 1 Gram(s) IV Intermittent once  meropenem  IVPB 1000 milliGRAM(s) IV Intermittent every 8 hours  montelukast 10 milliGRAM(s) Oral daily  pantoprazole    Tablet 40 milliGRAM(s) Oral before breakfast  phytonadione   Solution 10 milliGRAM(s) Oral daily  sodium phosphate IVPB 15 milliMole(s) IV Intermittent once  tiotropium 18 MICROgram(s) Capsule 1 Capsule(s) Inhalation daily  tiotropium 18 MICROgram(s) Capsule 1 Capsule(s) Inhalation daily  vancomycin  IVPB 1000 milliGRAM(s) IV Intermittent every 12 hours    MEDICATIONS  (PRN):  dextrose 40% Gel 15 Gram(s) Oral once PRN Blood Glucose LESS THAN 70 milliGRAM(s)/deciliter  glucagon  Injectable 1 milliGRAM(s) IntraMuscular once PRN Glucose LESS THAN 70 milligrams/deciliter  guaiFENesin    Syrup 100 milliGRAM(s) Oral every 6 hours PRN Cough    Vital Signs Last 24 Hrs  T(C): 37.4 (16 Aug 2019 06:08), Max: 37.4 (15 Aug 2019 17:43)  T(F): 99.4 (16 Aug 2019 06:08), Max: 99.4 (15 Aug 2019 17:43)  HR: 109 (16 Aug 2019 06:08) (105 - 109)  BP: 102/64 (16 Aug 2019 06:08) (96/60 - 138/72)  BP(mean): --  RR: 20 (16 Aug 2019 06:08) (18 - 22)  SpO2: 96% (16 Aug 2019 06:08) (95% - 98%)          08-15 @ 07:01  -  08-16 @ 07:00  --------------------------------------------------------  IN: 2090 mL / OUT: 560 mL / NET: 1530 mL      LABS:                        9.9    20.3  )-----------( 278      ( 16 Aug 2019 05:08 )             31.8     08-16    137  |  101  |  40<H>  ----------------------------<  127<H>  4.5   |  23  |  1.20    Ca    8.2<L>      16 Aug 2019 05:06  Phos  2.7     08-16  Mg     1.9     08-16    TPro  7.4  /  Alb  2.5<L>  /  TBili  0.6  /  DBili  x   /  AST  16  /  ALT  10  /  AlkPhos  131<H>  08-14      PT/INR - ( 16 Aug 2019 05:06 )   PT: 21.5 sec;   INR: 1.85 ratio         PTT - ( 16 Aug 2019 05:06 )  PTT:31.4 sec      CULTURES:  Culture Results:   Growth in aerobic bottle: Gram Positive Cocci in Clusters  "Due to technical problems, Proteus sp. will Not be reported as part of  the BCID panel until further notice"  ***Blood Panel PCR results on this specimen are available  approximately 3 hours after the Gram stain result.***  Gram stain, PCR, and/or culture results may not always  correspond due to difference in methodologies.  ************************************************************  This PCR assay was performed using [employer].  The following targets are tested for: Enterococcus,  vancomycin resistant enterococci, Listeria monocytogenes,  coagulase negative staphylococci, S. aureus,  methicillin resistant S. aureus, Streptococcus agalactiae  (Group B), S. pneumoniae, S.pyogenes (Group A),  Acinetobacter baumannii, Enterobacter cloacae, E. coli,  Klebsiella oxytoca, K. pneumoniae, Proteus sp.,  Serratia marcescens, Haemophilus influenzae,  Neisseria meningitidis, Pseudomonas aeruginosa, Candida  albicans, C. glabrata, C krusei, C parapsilosis,  C. tropicalis and the KPC resistance gene. (08-14 @ 17:44)  Culture Results:   No growth to date. (08-14 @ 17:44)    Physical Examination:    General: Non toxic, No acute distress.      HEENT: Pupils equal, reactive to light.  Symmetric.    PULM: Scattered exp rhonchi, no wheeze, good aeration    CVS: Regular rate and rhythm, no murmurs, rubs, or gallops    ABD: Soft, mild diffuse tenderness; ostomly and drains noted    EXT: No edema, nontender    SKIN: Warm and well perfused, no rashes noted.    NEURO: Alert, oriented, interactive, nonfocal    RADIOLOGY REVIEWED PERSONALLY  CXR:    PROCEDURE DATE:  08/16/2019        INTERPRETATION:  CLINICAL INFORMATION: Abnormal lung sounds and wheezing.    TECHNIQUE: Portable AP radiograph of the chest.    COMPARISON: Portable AP radiograph of the chest 8/14/2019. CT abdomen   pelvis 8/14/2019    FINDINGS:    The lungs are clear. No pleural effusion or pneumothorax.    Cardiomediastinal silhouette is normal. Left chest wall 3-lead AICD.    Unremarkable skeletal structures.      IMPRESSION:     Clear lungs        CT chest:    TTE:

## 2019-08-23 NOTE — PROGRESS NOTE ADULT - SUBJECTIVE AND OBJECTIVE BOX
Follow Up:  Intraabdominal Abscess    Interval History: Some increase in cough today. Abdominal pain stable. No chills or fevers.     REVIEW OF SYSTEMS  [  ] ROS unobtainable because:    [ x ] All other systems negative except as noted below    Constitutional:  [ ] fever [ ] chills  [ ] weight loss  [ ] weakness  Skin:  [ ] rash [ ] phlebitis	  Eyes: [ ] icterus [ ] pain  [ ] discharge	  ENMT: [ ] sore throat  [ ] thrush [ ] ulcers [ ] exudates  Respiratory: [ ] dyspnea [ ] hemoptysis [x ] cough [ ] sputum	  Cardiovascular:  [ ] chest pain [ ] palpitations [ ] edema	  Gastrointestinal:  [ ] nausea [ ] vomiting [ ] diarrhea [ ] constipation [x ] pain	  Genitourinary:  [ ] dysuria [ ] frequency [ ] hematuria [ ] discharge [ ] flank pain  [ ] incontinence  Musculoskeletal:  [ ] myalgias [ ] arthralgias [ ] arthritis  [ ] back pain  Neurological:  [ ] headache [ ] seizures  [ ] confusion/altered mental status    Allergies  Coreg (Other)  digoxin (Other; Short breath (Mild to Mod))  IV Contrast (Unknown)  penicillins (Hives)        ANTIMICROBIALS:  ertapenem  IVPB 1 every 24 hours      OTHER MEDS:  MEDICATIONS  (STANDING):  apixaban 5 every 12 hours  atorvastatin 20 at bedtime  buDESOnide  80 MICROgram(s)/formoterol 4.5 MICROgram(s) Inhaler 2 two times a day  dextrose 40% Gel 15 once PRN  dextrose 50% Injectable 12.5 once  dextrose 50% Injectable 25 once  dextrose 50% Injectable 25 once  glucagon  Injectable 1 once PRN  insulin glargine Injectable (LANTUS) 6 at bedtime  insulin lispro (HumaLOG) corrective regimen sliding scale  three times a day with meals  insulin lispro (HumaLOG) corrective regimen sliding scale  at bedtime  levalbuterol Inhalation 0.63 every 6 hours  loratadine 10 daily  montelukast 10 daily  pantoprazole    Tablet 40 before breakfast  tiotropium 18 MICROgram(s) Capsule 1 daily  tiotropium 18 MICROgram(s) Capsule 1 daily      Vital Signs Last 24 Hrs  T(C): 36.3 (23 Aug 2019 13:34), Max: 36.9 (22 Aug 2019 17:20)  T(F): 97.3 (23 Aug 2019 13:34), Max: 98.5 (23 Aug 2019 01:39)  HR: 90 (23 Aug 2019 13:34) (90 - 101)  BP: 109/76 (23 Aug 2019 13:34) (109/76 - 142/77)  BP(mean): --  RR: 18 (23 Aug 2019 13:34) (18 - 18)  SpO2: 100% (23 Aug 2019 13:34) (97% - 100%)    PHYSICAL EXAMINATION:  General: Alert and Awake, NAD  HEENT: PERRL, EOMI, No subconjunctival hemorrhages, Oropharynx Clear, MMM  Neck: Supple, No SARAHI  Cardiac: RRR, No M/R/G  Resp: CTAB, No Wh/Rh/Ra  Abdomen: +midline abdominal wound with mesh and some surrounding foul smelling drainage. 2 LLQ drains with mixed blood and brown liquid. NBS, ND, Mild diffuse tenderness to palpation, No HSM, No rigidity or guarding  MSK: Trace LE edema. No stigmata of IE. No evidence of phlebitis. No evidence of synovitis.  : External female urinary catheter  Skin: No rashes or lesions. Skin is warm and dry to the touch.   Neuro: Alert and Awake. CN 2-12 Grossly intact. Moves all four extremities spontaneously.  Psych: Calm, Pleasant, Cooperative                          7.6    12.20 )-----------( 278      ( 23 Aug 2019 08:45 )             24.1       08-23    136  |  103  |  11  ----------------------------<  68<L>  3.7   |  24  |  0.51    Ca    7.6<L>      23 Aug 2019 06:43  Phos  2.6     08-23  Mg     1.6     08-23            MICROBIOLOGY:  v  .Body Fluid right abdomen wall  08-16-19   No growth at 5 days  --    Few polymorphonuclear leukocytes per low power field  No organisms seen per oil power field      .Body Fluid left retroperitoneal  08-16-19   Numerous Escherichia coli  Numerous Alpha hemolytic strep "Susceptibilities not performed"  Moderate Bacteroides thetaiotaomicron "Susceptibilities not performed"  Few Enterococcus faecalis  --  Escherichia coli  Enterococcus faecalis      .Blood  08-16-19   No growth at 5 days.  --  --      .Blood  08-14-19   Growth in aerobic bottle: Coag Negative Staphylococcus  Single set isolate, possible contaminant. Contact  Microbiology if susceptibility testing clinically  indicated.  "Due to technical problems, Proteus sp. will Not be reported as part of  the BCID panel until further notice"  ***Blood Panel PCR results on this specimen are available  approximately 3 hours after the Gram stain result.***  Gram stain, PCR, and/or culture results may not always  correspond due to difference in methodologies.  ************************************************************  This PCR assay was performed using [employer].  The following targets are tested for: Enterococcus,  vancomycin resistant enterococci, Listeria monocytogenes,  coagulase negative staphylococci, S. aureus,  methicillin resistant S. aureus, Streptococcus agalactiae  (Group B), S. pneumoniae, S. pyogenes (Group A),  Acinetobacter baumannii, Enterobacter cloacae, E. coli,  Klebsiella oxytoca, K. pneumoniae, Proteus sp.,  Serratia marcescens, Haemophilus influenzae,  Neisseria meningitidis, Pseudomonas aeruginosa, Candida  albicans, C. glabrata, C krusei, C parapsilosis,  C. tropicalis and the KPC resistance gene.  --  Blood Culture PCR      .Sputum  07-29-19   Moderate Pseudomonas aeruginosa (Carbapenem Resistant) #2  Normal Respiratory Liz present  --  Pseudomonas aeruginosa (Carbapenem Resistant)    RADIOLOGY:    EXAM:  XR CHEST PORTABLE URGENT 1V                        PROCEDURE DATE:  08/20/2019    Right upper extremity PICC with tip in the SVC. Bilateral pleural effusions.    EXAM:  CT ABDOMEN AND PELVIS OC IC                        PROCEDURE DATE:  08/19/2019   Status post left hemicolectomy and right lower quadrant ostomy, with   large ventral abdominal wall hernia.     Percutaneous drainage catheters have been placed within the left lower   ventral abdominal wall collection, and left paracolic gutter collection.   Within the collections, there is extraluminal contrast consistent with   dehiscence of the sigmoid colon stump.    Minimal decrease in right ventral wall fluid collection

## 2019-08-23 NOTE — PROGRESS NOTE ADULT - SUBJECTIVE AND OBJECTIVE BOX
Interval Events: No acute interval events    S: Patient doing well, denies fevers, chills, nausea, emesis, chest pain, SOB.    O: Vital Signs  T(C): 36.7 (08-22 @ 21:23), Max: 36.9 (08-22 @ 17:20)  HR: 93 (08-22 @ 21:23) (89 - 93)  BP: 142/77 (08-22 @ 21:23) (116/70 - 142/77)  RR: 18 (08-22 @ 21:23) (18 - 18)  SpO2: 98% (08-22 @ 21:23) (97% - 100%)  08-21-19 @ 07:01  -  08-22-19 @ 07:00  --------------------------------------------------------  IN: 760 mL / OUT: 1380 mL / NET: -620 mL    08-22-19 @ 07:01  -  08-23-19 @ 01:17  --------------------------------------------------------  IN: 460 mL / OUT: 150 mL / NET: 310 mL      General: alert and oriented, NAD  Resp: airway patent, respirations unlabored  CVS: regular rate and rhythm  Abd: soft, mildly tender, nondistended. Ostomy in place with scant output. Strattice in place. 14 Indonesian pigtail catheter placement in LLQ and 14 Indonesian pigtail in left retroperitoneal fluid  Extremities: no edema  Skin: warm, dry, appropriate color                          8.4    11.5  )-----------( 310      ( 22 Aug 2019 09:27 )             26.4   08-22    135  |  103  |  14  ----------------------------<  110<H>  3.9   |  22  |  0.64    Ca    8.3<L>      22 Aug 2019 09:27  Phos  2.2     08-22  Mg     1.8     08-22

## 2019-08-23 NOTE — PROGRESS NOTE ADULT - ASSESSMENT
ASSESSMENT  74y F with recent history of ex lap, Lupe's procedure for perforated diverticulitis with sepsis.  She had multiple abdominal washouts and abdomen closed with strattice. She is otherwise hemodynamically stable and afebrile.     Plan:   - ID: Abx - Invanz through PICC  - LFD (consistent carb)  - Trend WBC  - Dispo: rehab today    Green team  [id number]

## 2019-08-23 NOTE — PROGRESS NOTE ADULT - ASSESSMENT
74 year old female PMH of HFrEF, s/p AICD, asthma, HTN, HLD, atrial fibrillation on eliquis, with previous perforated diverticulosis, Hartmans procedure with multiple RTOR for abd washouts. Admitted 6/30 - 8/2 for feculent peritonitis with septic shock secondary to perforated diverticulitis with hospital course c/b HCAP 2/2 CR Pseudomonas.    CT A/P 8/14 with new left lower ventral abdominal wall air-containing fluid collection with intraperitoneal communication to the level of the sigmoid colon stump and Additional enlargement of the left paracolic gutter collection.     Repeat CT A/P 8/19 with extraluminal contrast consistent with dehiscence of the sigmoid colon stump. IR Drain Cultures with E. coli, AHS and Bacteroides.     Discussed patient's case with radiology on 8/20. Fistula from stump with connection to abscess cavity. Would be high risk for surgery at this time and would not likely heal the stump if operated on currently. Tentative plan for reoperation in 2-4 weeks based on followup imaging.     Overall, Intraabdominal abscess with sigmoid colon stump dehiscence, Leukocytosis    #Intraabdominal Abscesses and Sigmoid Stump Dehiscence  --Continue Ertapenem 1g IV Q24H  --On discharge would write for 4 week script of ertapenem  --Recommend CT A/P with IV and PO/Rectal Contrast in 2 weeks from last scan (~9/2/19)  --Recommend CBC with Diff and CMP qWeekly while on antimicrobials. Please have results faxed to [phone]  --Patient was advised to follow up with me in the Infectious Diseases Office in 2 weeks time. Patient was provided with my office contact number of [phone]    CoNS on BCx - likely procurement contaminant     #Leukocytosis - related to intraabdominal abscess  --Continue antibiotics as above  --Continue to trend WBC - if continues to increase would recommend BCx and repeat CXR (given cough)    I will be away over this upcoming weekend. Please contact the Infectious Diseases Office with any further questions or concerns.     Rex Goode M.D.  [facility] Division of Infectious Disease  8AM-5PM: Pager Number [phone]  After Hours (or if no response): Please contact the Infectious Diseases Office at [phone]

## 2019-08-24 LAB
HCT VFR BLD CALC: 25.3 % — LOW (ref 34.5–45)
HGB BLD-MCNC: 7.7 G/DL — LOW (ref 11.5–15.5)
MCHC RBC-ENTMCNC: 29.8 PG — SIGNIFICANT CHANGE UP (ref 27–34)
MCHC RBC-ENTMCNC: 30.4 GM/DL — LOW (ref 32–36)
MCV RBC AUTO: 98.1 FL — SIGNIFICANT CHANGE UP (ref 80–100)
PLATELET # BLD AUTO: 275 K/UL — SIGNIFICANT CHANGE UP (ref 150–400)
RBC # BLD: 2.58 M/UL — LOW (ref 3.8–5.2)
RBC # FLD: 20.7 % — HIGH (ref 10.3–14.5)
WBC # BLD: 10.77 K/UL — HIGH (ref 3.8–10.5)
WBC # FLD AUTO: 10.77 K/UL — HIGH (ref 3.8–10.5)

## 2019-08-24 PROCEDURE — 99024 POSTOP FOLLOW-UP VISIT: CPT

## 2019-08-24 RX ADMIN — MAGNESIUM OXIDE 400 MG ORAL TABLET 400 MILLIGRAM(S): 241.3 TABLET ORAL at 10:00

## 2019-08-24 RX ADMIN — INSULIN GLARGINE 6 UNIT(S): 100 INJECTION, SOLUTION SUBCUTANEOUS at 22:10

## 2019-08-24 RX ADMIN — BUDESONIDE AND FORMOTEROL FUMARATE DIHYDRATE 2 PUFF(S): 160; 4.5 AEROSOL RESPIRATORY (INHALATION) at 17:43

## 2019-08-24 RX ADMIN — MAGNESIUM OXIDE 400 MG ORAL TABLET 400 MILLIGRAM(S): 241.3 TABLET ORAL at 17:43

## 2019-08-24 RX ADMIN — Medication 400 UNIT(S): at 13:48

## 2019-08-24 RX ADMIN — APIXABAN 5 MILLIGRAM(S): 2.5 TABLET, FILM COATED ORAL at 05:15

## 2019-08-24 RX ADMIN — MONTELUKAST 10 MILLIGRAM(S): 4 TABLET, CHEWABLE ORAL at 13:49

## 2019-08-24 RX ADMIN — Medication 0: at 22:11

## 2019-08-24 RX ADMIN — LORATADINE 10 MILLIGRAM(S): 10 TABLET ORAL at 13:48

## 2019-08-24 RX ADMIN — CHLORHEXIDINE GLUCONATE 1 APPLICATION(S): 213 SOLUTION TOPICAL at 13:49

## 2019-08-24 RX ADMIN — LEVALBUTEROL 0.63 MILLIGRAM(S): 1.25 SOLUTION, CONCENTRATE RESPIRATORY (INHALATION) at 17:43

## 2019-08-24 RX ADMIN — LEVALBUTEROL 0.63 MILLIGRAM(S): 1.25 SOLUTION, CONCENTRATE RESPIRATORY (INHALATION) at 05:15

## 2019-08-24 RX ADMIN — ERTAPENEM SODIUM 100.02 MILLIGRAM(S): 1 INJECTION, POWDER, LYOPHILIZED, FOR SOLUTION INTRAMUSCULAR; INTRAVENOUS at 13:48

## 2019-08-24 RX ADMIN — LEVALBUTEROL 0.63 MILLIGRAM(S): 1.25 SOLUTION, CONCENTRATE RESPIRATORY (INHALATION) at 00:18

## 2019-08-24 RX ADMIN — BUDESONIDE AND FORMOTEROL FUMARATE DIHYDRATE 2 PUFF(S): 160; 4.5 AEROSOL RESPIRATORY (INHALATION) at 05:15

## 2019-08-24 RX ADMIN — MAGNESIUM OXIDE 400 MG ORAL TABLET 400 MILLIGRAM(S): 241.3 TABLET ORAL at 13:49

## 2019-08-24 RX ADMIN — APIXABAN 5 MILLIGRAM(S): 2.5 TABLET, FILM COATED ORAL at 17:43

## 2019-08-24 RX ADMIN — ATORVASTATIN CALCIUM 20 MILLIGRAM(S): 80 TABLET, FILM COATED ORAL at 22:11

## 2019-08-24 RX ADMIN — TIOTROPIUM BROMIDE 1 CAPSULE(S): 18 CAPSULE ORAL; RESPIRATORY (INHALATION) at 13:48

## 2019-08-24 RX ADMIN — PANTOPRAZOLE SODIUM 40 MILLIGRAM(S): 20 TABLET, DELAYED RELEASE ORAL at 05:15

## 2019-08-24 RX ADMIN — LEVALBUTEROL 0.63 MILLIGRAM(S): 1.25 SOLUTION, CONCENTRATE RESPIRATORY (INHALATION) at 13:48

## 2019-08-24 NOTE — PROGRESS NOTE ADULT - ATTENDING COMMENTS
I have seen and evaluated the patient and discussed the relevant clinical findings and plan with the surgical housestaff and fellow.  Agree with the above documentation with addenda as noted.     Clinically stable  Abdomen soft, midline wound granulating well  Drains small amount of feculent-purulent drainage  Cont abx as per ID  Stable for rehab     Clark Alvarado MD

## 2019-08-24 NOTE — PROGRESS NOTE ADULT - SUBJECTIVE AND OBJECTIVE BOX
Green Team Surgery Progress Note     SUBJECTIVE / 24H EVENTS  Patient seen and examined on morning rounds. No acute events overnight.    OBJECTIVE:    VITAL SIGNS:  T(C): 36.8 (08-24-19 @ 00:50), Max: 36.8 (08-23-19 @ 06:38)  HR: 99 (08-24-19 @ 02:10) (90 - 103)  BP: 101/60 (08-24-19 @ 02:10) (94/57 - 129/69)  RR: 18 (08-24-19 @ 00:50) (18 - 18)  SpO2: 97% (08-24-19 @ 00:50) (97% - 100%)      POCT Blood Glucose.: 101 mg/dL (08-23-19 @ 21:34)  POCT Blood Glucose.: 99 mg/dL (08-23-19 @ 17:47)  POCT Blood Glucose.: 100 mg/dL (08-23-19 @ 13:30)      PHYSICAL EXAM:  General: alert and oriented, NAD  Resp: airway patent, respirations unlabored  CVS: RRR  Abd: soft, mildly tender, nondistended. Ostomy in place with scant output. Strattice in place. 14 [place] pigtail catheter placement in LLQ and 14 [place] pigtail in left retroperitoneal fluid  Extremities: WWP      08-22-19 @ 07:01  -  08-23-19 @ 07:00  --------------------------------------------------------  IN:    Oral Fluid: 560 mL    Solution: 50 mL  Total IN: 610 mL    OUT:    Bulb: 5 mL    Voided: 700 mL  Total OUT: 705 mL    Total NET: -95 mL      08-23-19 @ 07:01  -  08-24-19 @ 06:21  --------------------------------------------------------  IN:    Oral Fluid: 850 mL    Solution: 50 mL    Solution: 50 mL  Total IN: 950 mL    OUT:    Voided: 1050 mL  Total OUT: 1050 mL    Total NET: -100 mL          LAB VALUES:  08-23    136  |  103  |  11  ----------------------------<  68<L>  3.7   |  24  |  0.51    Ca    7.6<L>      23 Aug 2019 06:43  Phos  2.6     08-23  Mg     1.6     08-23                                 7.6    12.20 )-----------( 278      ( 23 Aug 2019 08:45 )             24.1                   MICROBIOLOGY:    No new microbiology data for review.     RADIOLOGY:    No new radiographic images for review.    MEDICATIONS  (STANDING):  apixaban 5 milliGRAM(s) Oral every 12 hours  atorvastatin 20 milliGRAM(s) Oral at bedtime  buDESOnide  80 MICROgram(s)/formoterol 4.5 MICROgram(s) Inhaler 2 Puff(s) Inhalation two times a day  chlorhexidine 2% Cloths 1 Application(s) Topical daily  cholecalciferol 400 Unit(s) Oral daily  dextrose 5%. 1000 milliLiter(s) (50 mL/Hr) IV Continuous <Continuous>  dextrose 50% Injectable 12.5 Gram(s) IV Push once  dextrose 50% Injectable 25 Gram(s) IV Push once  dextrose 50% Injectable 25 Gram(s) IV Push once  ertapenem  IVPB 1 milliGRAM(s) IV Intermittent every 24 hours  insulin glargine Injectable (LANTUS) 6 Unit(s) SubCutaneous at bedtime  insulin lispro (HumaLOG) corrective regimen sliding scale   SubCutaneous three times a day with meals  insulin lispro (HumaLOG) corrective regimen sliding scale   SubCutaneous at bedtime  levalbuterol Inhalation 0.63 milliGRAM(s) Inhalation every 6 hours  loratadine 10 milliGRAM(s) Oral daily  magnesium oxide 400 milliGRAM(s) Oral three times a day with meals  montelukast 10 milliGRAM(s) Oral daily  pantoprazole    Tablet 40 milliGRAM(s) Oral before breakfast  tetracaine/benzocaine/butamben Spray 1 Spray(s) Topical once  tiotropium 18 MICROgram(s) Capsule 1 Capsule(s) Inhalation daily  tiotropium 18 MICROgram(s) Capsule 1 Capsule(s) Inhalation daily    MEDICATIONS  (PRN):  dextrose 40% Gel 15 Gram(s) Oral once PRN Blood Glucose LESS THAN 70 milliGRAM(s)/deciliter  glucagon  Injectable 1 milliGRAM(s) IntraMuscular once PRN Glucose LESS THAN 70 milligrams/deciliter

## 2019-08-24 NOTE — PROGRESS NOTE ADULT - ASSESSMENT
ASSESSMENT  74y F with recent history of ex lap, Lupe's procedure for perforated diverticulitis with sepsis.  She had multiple abdominal washouts and abdomen closed with strattice. She is otherwise hemodynamically stable and afebrile.     Plan:   - ID: Abx - Invanz through PICC, d/c with and obtain f/u CT in 2 weeks, outpatient labs weekly  - Pulmonary: continue duonebs and symbicort  - LFD (consistent carb)  - Trend WBC  - Dispo: rehab pending insurance authorization     Green team  [id number]

## 2019-08-25 LAB
ANION GAP SERPL CALC-SCNC: 12 MMOL/L — SIGNIFICANT CHANGE UP (ref 5–17)
APTT BLD: 34.7 SEC — SIGNIFICANT CHANGE UP (ref 27.5–36.3)
BUN SERPL-MCNC: 10 MG/DL — SIGNIFICANT CHANGE UP (ref 7–23)
CALCIUM SERPL-MCNC: 7.3 MG/DL — LOW (ref 8.4–10.5)
CHLORIDE SERPL-SCNC: 103 MMOL/L — SIGNIFICANT CHANGE UP (ref 96–108)
CO2 SERPL-SCNC: 20 MMOL/L — LOW (ref 22–31)
CREAT SERPL-MCNC: 0.49 MG/DL — LOW (ref 0.5–1.3)
GLUCOSE SERPL-MCNC: 106 MG/DL — HIGH (ref 70–99)
HCT VFR BLD CALC: 29.2 % — LOW (ref 34.5–45)
HGB BLD-MCNC: 9.2 G/DL — LOW (ref 11.5–15.5)
INR BLD: 2.23 RATIO — HIGH (ref 0.88–1.16)
MCHC RBC-ENTMCNC: 31.5 GM/DL — LOW (ref 32–36)
MCHC RBC-ENTMCNC: 31.8 PG — SIGNIFICANT CHANGE UP (ref 27–34)
MCV RBC AUTO: 101 FL — HIGH (ref 80–100)
PLATELET # BLD AUTO: 265 K/UL — SIGNIFICANT CHANGE UP (ref 150–400)
POTASSIUM SERPL-MCNC: 4 MMOL/L — SIGNIFICANT CHANGE UP (ref 3.5–5.3)
POTASSIUM SERPL-SCNC: 4 MMOL/L — SIGNIFICANT CHANGE UP (ref 3.5–5.3)
PROTHROM AB SERPL-ACNC: 26.1 SEC — HIGH (ref 10–12.9)
RBC # BLD: 2.89 M/UL — LOW (ref 3.8–5.2)
RBC # FLD: 18.9 % — HIGH (ref 10.3–14.5)
SODIUM SERPL-SCNC: 135 MMOL/L — SIGNIFICANT CHANGE UP (ref 135–145)
WBC # BLD: 10.4 K/UL — SIGNIFICANT CHANGE UP (ref 3.8–10.5)
WBC # FLD AUTO: 10.4 K/UL — SIGNIFICANT CHANGE UP (ref 3.8–10.5)

## 2019-08-25 PROCEDURE — 99024 POSTOP FOLLOW-UP VISIT: CPT

## 2019-08-25 PROCEDURE — 71045 X-RAY EXAM CHEST 1 VIEW: CPT | Mod: 26

## 2019-08-25 PROCEDURE — 93010 ELECTROCARDIOGRAM REPORT: CPT

## 2019-08-25 RX ORDER — PHYTONADIONE (VIT K1) 5 MG
5 TABLET ORAL ONCE
Refills: 0 | Status: COMPLETED | OUTPATIENT
Start: 2019-08-25 | End: 2019-08-25

## 2019-08-25 RX ADMIN — MAGNESIUM OXIDE 400 MG ORAL TABLET 400 MILLIGRAM(S): 241.3 TABLET ORAL at 12:05

## 2019-08-25 RX ADMIN — TIOTROPIUM BROMIDE 1 CAPSULE(S): 18 CAPSULE ORAL; RESPIRATORY (INHALATION) at 12:05

## 2019-08-25 RX ADMIN — LEVALBUTEROL 0.63 MILLIGRAM(S): 1.25 SOLUTION, CONCENTRATE RESPIRATORY (INHALATION) at 23:47

## 2019-08-25 RX ADMIN — BUDESONIDE AND FORMOTEROL FUMARATE DIHYDRATE 2 PUFF(S): 160; 4.5 AEROSOL RESPIRATORY (INHALATION) at 05:22

## 2019-08-25 RX ADMIN — ERTAPENEM SODIUM 100.02 MILLIGRAM(S): 1 INJECTION, POWDER, LYOPHILIZED, FOR SOLUTION INTRAMUSCULAR; INTRAVENOUS at 12:11

## 2019-08-25 RX ADMIN — BUDESONIDE AND FORMOTEROL FUMARATE DIHYDRATE 2 PUFF(S): 160; 4.5 AEROSOL RESPIRATORY (INHALATION) at 17:19

## 2019-08-25 RX ADMIN — MAGNESIUM OXIDE 400 MG ORAL TABLET 400 MILLIGRAM(S): 241.3 TABLET ORAL at 14:05

## 2019-08-25 RX ADMIN — LEVALBUTEROL 0.63 MILLIGRAM(S): 1.25 SOLUTION, CONCENTRATE RESPIRATORY (INHALATION) at 12:04

## 2019-08-25 RX ADMIN — APIXABAN 5 MILLIGRAM(S): 2.5 TABLET, FILM COATED ORAL at 17:20

## 2019-08-25 RX ADMIN — Medication 5 MILLIGRAM(S): at 23:47

## 2019-08-25 RX ADMIN — MAGNESIUM OXIDE 400 MG ORAL TABLET 400 MILLIGRAM(S): 241.3 TABLET ORAL at 17:20

## 2019-08-25 RX ADMIN — PANTOPRAZOLE SODIUM 40 MILLIGRAM(S): 20 TABLET, DELAYED RELEASE ORAL at 06:21

## 2019-08-25 RX ADMIN — LORATADINE 10 MILLIGRAM(S): 10 TABLET ORAL at 12:04

## 2019-08-25 RX ADMIN — LEVALBUTEROL 0.63 MILLIGRAM(S): 1.25 SOLUTION, CONCENTRATE RESPIRATORY (INHALATION) at 05:02

## 2019-08-25 RX ADMIN — Medication 400 UNIT(S): at 12:04

## 2019-08-25 RX ADMIN — LEVALBUTEROL 0.63 MILLIGRAM(S): 1.25 SOLUTION, CONCENTRATE RESPIRATORY (INHALATION) at 17:20

## 2019-08-25 RX ADMIN — MONTELUKAST 10 MILLIGRAM(S): 4 TABLET, CHEWABLE ORAL at 12:12

## 2019-08-25 RX ADMIN — ATORVASTATIN CALCIUM 20 MILLIGRAM(S): 80 TABLET, FILM COATED ORAL at 23:46

## 2019-08-25 RX ADMIN — APIXABAN 5 MILLIGRAM(S): 2.5 TABLET, FILM COATED ORAL at 05:23

## 2019-08-25 RX ADMIN — LEVALBUTEROL 0.63 MILLIGRAM(S): 1.25 SOLUTION, CONCENTRATE RESPIRATORY (INHALATION) at 00:03

## 2019-08-25 RX ADMIN — CHLORHEXIDINE GLUCONATE 1 APPLICATION(S): 213 SOLUTION TOPICAL at 12:18

## 2019-08-25 NOTE — PROGRESS NOTE ADULT - SUBJECTIVE AND OBJECTIVE BOX
Green Team Surgery Progress Note     SUBJECTIVE / 24H EVENTS  Patient seen and examined on morning rounds. No acute events overnight.    OBJECTIVE:    VITAL SIGNS:  T(C): 37.1 (08-25-19 @ 05:32), Max: 37.7 (08-25-19 @ 00:54)  HR: 99 (08-25-19 @ 05:32) (90 - 103)  BP: 106/70 (08-25-19 @ 05:32) (100/69 - 119/74)  RR: 17 (08-25-19 @ 05:32) (17 - 18)  SpO2: 97% (08-25-19 @ 05:32) (95% - 100%)      POCT Blood Glucose.: 103 mg/dL (08-24-19 @ 21:59)  POCT Blood Glucose.: 88 mg/dL (08-24-19 @ 17:38)  POCT Blood Glucose.: 98 mg/dL (08-24-19 @ 13:52)      PHYSICAL EXAM:  General: alert and oriented, NAD  Resp: airway patent, respirations unlabored  CVS: regular rate and rhythm  Abd: soft, mildly tender, nondistended. Ostomy in place with scant output. Strattice in place. 14 Swiss pigtail catheter placement in LLQ and 14 Swiss pigtail in left retroperitoneal fluid  Extremities: [facility]      08-23-19 @ 07:01  -  08-24-19 @ 07:00  --------------------------------------------------------  IN:    Oral Fluid: 850 mL    Solution: 50 mL    Solution: 50 mL  Total IN: 950 mL    OUT:    Voided: 1050 mL  Total OUT: 1050 mL    Total NET: -100 mL      08-24-19 @ 07:01  -  08-25-19 @ 06:26  --------------------------------------------------------  IN:    Oral Fluid: 720 mL    Solution: 50 mL  Total IN: 770 mL    OUT:    Bulb: 20 mL    Bulb: 40 mL    Colostomy: 152 mL    Voided: 450 mL  Total OUT: 662 mL    Total NET: 108 mL          LAB VALUES:  08-23    136  |  103  |  11  ----------------------------<  68<L>  3.7   |  24  |  0.51    Ca    7.6<L>      23 Aug 2019 06:43  Phos  2.6     08-23  Mg     1.6     08-23                                 7.7    10.77 )-----------( 275      ( 24 Aug 2019 10:30 )             25.3                   MICROBIOLOGY:    No new microbiology data for review.     RADIOLOGY:    No new radiographic images for review.    MEDICATIONS  (STANDING):  apixaban 5 milliGRAM(s) Oral every 12 hours  atorvastatin 20 milliGRAM(s) Oral at bedtime  buDESOnide  80 MICROgram(s)/formoterol 4.5 MICROgram(s) Inhaler 2 Puff(s) Inhalation two times a day  chlorhexidine 2% Cloths 1 Application(s) Topical daily  cholecalciferol 400 Unit(s) Oral daily  dextrose 5%. 1000 milliLiter(s) (50 mL/Hr) IV Continuous <Continuous>  dextrose 50% Injectable 12.5 Gram(s) IV Push once  dextrose 50% Injectable 25 Gram(s) IV Push once  dextrose 50% Injectable 25 Gram(s) IV Push once  ertapenem  IVPB 1 milliGRAM(s) IV Intermittent every 24 hours  insulin glargine Injectable (LANTUS) 6 Unit(s) SubCutaneous at bedtime  insulin lispro (HumaLOG) corrective regimen sliding scale   SubCutaneous three times a day with meals  insulin lispro (HumaLOG) corrective regimen sliding scale   SubCutaneous at bedtime  levalbuterol Inhalation 0.63 milliGRAM(s) Inhalation every 6 hours  loratadine 10 milliGRAM(s) Oral daily  magnesium oxide 400 milliGRAM(s) Oral three times a day with meals  montelukast 10 milliGRAM(s) Oral daily  pantoprazole    Tablet 40 milliGRAM(s) Oral before breakfast  tetracaine/benzocaine/butamben Spray 1 Spray(s) Topical once  tiotropium 18 MICROgram(s) Capsule 1 Capsule(s) Inhalation daily  tiotropium 18 MICROgram(s) Capsule 1 Capsule(s) Inhalation daily    MEDICATIONS  (PRN):  dextrose 40% Gel 15 Gram(s) Oral once PRN Blood Glucose LESS THAN 70 milliGRAM(s)/deciliter  glucagon  Injectable 1 milliGRAM(s) IntraMuscular once PRN Glucose LESS THAN 70 milligrams/deciliter

## 2019-08-25 NOTE — PROGRESS NOTE ADULT - ASSESSMENT
ASSESSMENT  74y F with recent history of ex lap, Lupe's procedure for perforated diverticulitis with sepsis.  She had multiple abdominal washouts and abdomen closed with strattice. She is otherwise hemodynamically stable and afebrile.     Plan:   - ID: Abx - Invanz through PICC, d/c with and obtain f/u CT in 2 weeks, outpatient labs weekly  - Pulmonary: continue duonebs and symbicort  - LFD (consistent carb)  - Trend WBC  - Dispo: rehab pending insurance authorization     Green team  [id number]

## 2019-08-25 NOTE — PROGRESS NOTE ADULT - ATTENDING COMMENTS
I have seen and examined the patient.  I agree with the surgical resident's note.  Looked at the wound - desiccated Stattice on open wound.    Ariel Chambers contact information [phone]

## 2019-08-25 NOTE — CHART NOTE - NSCHARTNOTEFT_GEN_A_CORE
Green Team Surgery Acute Event Note    Patient was noted to be saturating her dressing with sanguinous output. I removed the dressing and there was some oozing from the right lateral wound edge but no vascular bleeding. A new dressing was placed and will monitor for future saturation. She also mentioned she was having some chest pressure that didn't radiate to her jaw or arm. As a result we sent CBC, BMP, Troponin, EKG and CXR were obtained. Her labs didn't demonstrate any leukocytosis and her H/H was stable and improved from two days ago. Her BMP showed a bicarb of 20 and her INR was 2.23 from 1.27. Her EKG demonstrated new T wave inversions in V3-V6 but no ST changes. Her troponin was 38 and acute coronary syndrome was felt to be unlikely at this point. We gave her 5 mg Vitamin K for her elevated INR and oozing wound. We will continue to monitor for future chest pressure but currently the patient is hemodynamically stable and afebrile.       Vital Signs  T(C): 37.2  HR: 99  BP: 122/78  RR: 18  SpO2: 100%                            9.2    10.4  )-----------( 265      ( 25 Aug 2019 22:08 )             29.2       08-25    135  |  103  |  10  ----------------------------<  106<H>  4.0   |  20<L>  |  0.49<L>    Ca    7.3<L>      25 Aug 2019 22:06        Troponin T, High Sensitivity (08.25.19 @ 22:06)    Troponin T, High Sensitivity Result: 38      CXR: lungs clear, no pneumothorax or pleural effusion Green Team Surgery Acute Event Note    Patient was noted to be saturating her dressing with sanguinous output. I removed the dressing and there was some oozing from the right lateral wound edge but no vascular bleeding. A new dressing was placed and will monitor for future saturation. She also mentioned she was having some chest pressure that didn't radiate to her jaw or arm. As a result we sent CBC, BMP, Troponin, EKG and CXR were obtained. Her labs didn't demonstrate any leukocytosis and her H/H was stable and improved from two days ago. Her BMP showed a bicarb of 20 and her INR was 2.23 from 1.27. Her EKG demonstrated new T wave inversions in V3-V6 but no ST changes. Her troponin was 38 and acute coronary syndrome was felt to be unlikely at this point. We gave her 5 mg Vitamin K for her elevated INR and oozing wound. We will continue to monitor for future chest pressure but currently the patient is hemodynamically stable and afebrile.       Vital Signs  T(C): 37.2  HR: 99  BP: 122/78  RR: 18  SpO2: 100%                            9.2    10.4  )-----------( 265      ( 25 Aug 2019 22:08 )             29.2       08-25    135  |  103  |  10  ----------------------------<  106<H>  4.0   |  20<L>  |  0.49<L>    Ca    7.3<L>      25 Aug 2019 22:06      PT/INR - ( 25 Aug 2019 22:04 )   PT: 26.1 sec;   INR: 2.23 ratio         PTT - ( 25 Aug 2019 22:04 )  PTT:34.7 sec      Troponin T, High Sensitivity (08.25.19 @ 22:06)    Troponin T, High Sensitivity Result: 38      CXR: lungs clear, no pneumothorax or pleural effusion

## 2019-08-26 DIAGNOSIS — I50.22 CHRONIC SYSTOLIC (CONGESTIVE) HEART FAILURE: ICD-10-CM

## 2019-08-26 DIAGNOSIS — R18.8 OTHER ASCITES: ICD-10-CM

## 2019-08-26 LAB — TROPONIN T, HIGH SENSITIVITY RESULT: 41 NG/L — SIGNIFICANT CHANGE UP (ref 0–51)

## 2019-08-26 PROCEDURE — 99232 SBSQ HOSP IP/OBS MODERATE 35: CPT

## 2019-08-26 PROCEDURE — 99024 POSTOP FOLLOW-UP VISIT: CPT

## 2019-08-26 PROCEDURE — 99223 1ST HOSP IP/OBS HIGH 75: CPT

## 2019-08-26 RX ORDER — LIDOCAINE 4 G/100G
1 CREAM TOPICAL DAILY
Refills: 0 | Status: DISCONTINUED | OUTPATIENT
Start: 2019-08-26 | End: 2019-08-27

## 2019-08-26 RX ORDER — CHLORHEXIDINE GLUCONATE 213 G/1000ML
1 SOLUTION TOPICAL
Refills: 0 | Status: DISCONTINUED | OUTPATIENT
Start: 2019-08-26 | End: 2019-08-27

## 2019-08-26 RX ORDER — SODIUM CHLORIDE 9 MG/ML
10 INJECTION INTRAMUSCULAR; INTRAVENOUS; SUBCUTANEOUS
Refills: 0 | Status: DISCONTINUED | OUTPATIENT
Start: 2019-08-26 | End: 2019-08-27

## 2019-08-26 RX ORDER — FUROSEMIDE 40 MG
40 TABLET ORAL DAILY
Refills: 0 | Status: DISCONTINUED | OUTPATIENT
Start: 2019-08-26 | End: 2019-08-27

## 2019-08-26 RX ORDER — ACETAMINOPHEN 500 MG
975 TABLET ORAL EVERY 6 HOURS
Refills: 0 | Status: DISCONTINUED | OUTPATIENT
Start: 2019-08-26 | End: 2019-08-27

## 2019-08-26 RX ORDER — SPIRONOLACTONE 25 MG/1
25 TABLET, FILM COATED ORAL DAILY
Refills: 0 | Status: DISCONTINUED | OUTPATIENT
Start: 2019-08-26 | End: 2019-08-27

## 2019-08-26 RX ADMIN — LORATADINE 10 MILLIGRAM(S): 10 TABLET ORAL at 12:09

## 2019-08-26 RX ADMIN — APIXABAN 5 MILLIGRAM(S): 2.5 TABLET, FILM COATED ORAL at 17:11

## 2019-08-26 RX ADMIN — MAGNESIUM OXIDE 400 MG ORAL TABLET 400 MILLIGRAM(S): 241.3 TABLET ORAL at 12:02

## 2019-08-26 RX ADMIN — ERTAPENEM SODIUM 100.02 MILLIGRAM(S): 1 INJECTION, POWDER, LYOPHILIZED, FOR SOLUTION INTRAMUSCULAR; INTRAVENOUS at 12:01

## 2019-08-26 RX ADMIN — LEVALBUTEROL 0.63 MILLIGRAM(S): 1.25 SOLUTION, CONCENTRATE RESPIRATORY (INHALATION) at 12:00

## 2019-08-26 RX ADMIN — LIDOCAINE 1 PATCH: 4 CREAM TOPICAL at 19:53

## 2019-08-26 RX ADMIN — Medication 975 MILLIGRAM(S): at 23:09

## 2019-08-26 RX ADMIN — LIDOCAINE 1 PATCH: 4 CREAM TOPICAL at 13:08

## 2019-08-26 RX ADMIN — ATORVASTATIN CALCIUM 20 MILLIGRAM(S): 80 TABLET, FILM COATED ORAL at 22:09

## 2019-08-26 RX ADMIN — CHLORHEXIDINE GLUCONATE 1 APPLICATION(S): 213 SOLUTION TOPICAL at 12:00

## 2019-08-26 RX ADMIN — SPIRONOLACTONE 25 MILLIGRAM(S): 25 TABLET, FILM COATED ORAL at 13:08

## 2019-08-26 RX ADMIN — Medication 40 MILLIGRAM(S): at 13:08

## 2019-08-26 RX ADMIN — MAGNESIUM OXIDE 400 MG ORAL TABLET 400 MILLIGRAM(S): 241.3 TABLET ORAL at 17:11

## 2019-08-26 RX ADMIN — MONTELUKAST 10 MILLIGRAM(S): 4 TABLET, CHEWABLE ORAL at 12:01

## 2019-08-26 RX ADMIN — LEVALBUTEROL 0.63 MILLIGRAM(S): 1.25 SOLUTION, CONCENTRATE RESPIRATORY (INHALATION) at 06:05

## 2019-08-26 RX ADMIN — TIOTROPIUM BROMIDE 1 CAPSULE(S): 18 CAPSULE ORAL; RESPIRATORY (INHALATION) at 12:01

## 2019-08-26 RX ADMIN — LEVALBUTEROL 0.63 MILLIGRAM(S): 1.25 SOLUTION, CONCENTRATE RESPIRATORY (INHALATION) at 23:44

## 2019-08-26 RX ADMIN — BUDESONIDE AND FORMOTEROL FUMARATE DIHYDRATE 2 PUFF(S): 160; 4.5 AEROSOL RESPIRATORY (INHALATION) at 06:05

## 2019-08-26 RX ADMIN — PANTOPRAZOLE SODIUM 40 MILLIGRAM(S): 20 TABLET, DELAYED RELEASE ORAL at 06:05

## 2019-08-26 RX ADMIN — Medication 400 UNIT(S): at 12:01

## 2019-08-26 RX ADMIN — APIXABAN 5 MILLIGRAM(S): 2.5 TABLET, FILM COATED ORAL at 06:05

## 2019-08-26 RX ADMIN — MAGNESIUM OXIDE 400 MG ORAL TABLET 400 MILLIGRAM(S): 241.3 TABLET ORAL at 09:39

## 2019-08-26 RX ADMIN — LEVALBUTEROL 0.63 MILLIGRAM(S): 1.25 SOLUTION, CONCENTRATE RESPIRATORY (INHALATION) at 17:12

## 2019-08-26 RX ADMIN — Medication 975 MILLIGRAM(S): at 23:39

## 2019-08-26 RX ADMIN — BUDESONIDE AND FORMOTEROL FUMARATE DIHYDRATE 2 PUFF(S): 160; 4.5 AEROSOL RESPIRATORY (INHALATION) at 17:11

## 2019-08-26 NOTE — CONSULT NOTE ADULT - CONSULT REASON
Abdominal pain/"cellulitis around wound vac"
Dyspnea
Elevated INR
HF management
Intraabdominal Abscess

## 2019-08-26 NOTE — PROGRESS NOTE ADULT - SUBJECTIVE AND OBJECTIVE BOX
Green Team Surgery Progress Note     SUBJECTIVE / 24H EVENTS  Patient seen and examined on morning rounds. She was having some oozing from the wound that saturated her lower dressing. Her dressing was replaced and some oozing was noticed from the right lateral wound edge. She also was complaining of chest pressure and EKG showed new T-wave inversions in V3-V6 but her troponin was 38. She also is complaining of feeling cold.     OBJECTIVE:    VITAL SIGNS:  T(C): 37.2 (08-25-19 @ 21:02), Max: 37.7 (08-25-19 @ 00:54)  HR: 99 (08-25-19 @ 21:02) (99 - 103)  BP: 122/78 (08-25-19 @ 21:02) (105/68 - 122/78)  RR: 18 (08-25-19 @ 21:02) (17 - 18)  SpO2: 100% (08-25-19 @ 21:02) (95% - 100%)      POCT Blood Glucose.: 83 mg/dL (08-25-19 @ 21:15)  POCT Blood Glucose.: 102 mg/dL (08-25-19 @ 18:24)  POCT Blood Glucose.: 88 mg/dL (08-25-19 @ 14:19)      PHYSICAL EXAM:  General: alert and oriented, NAD  Resp: airway patent, respirations unlabored  CVS: regular rate and rhythm  Abd: soft, mildly tender, nondistended. Ostomy in place and functioning. Strattice in place. 14 [place] pigtail catheter placement in LLQ and 14 [place] pigtail in left retroperitoneal fluid  Extremities: [facility]      08-24-19 @ 07:01  -  08-25-19 @ 07:00  --------------------------------------------------------  IN:    Oral Fluid: 720 mL    Solution: 50 mL  Total IN: 770 mL    OUT:    Bulb: 20 mL    Bulb: 40 mL    Colostomy: 152 mL    Voided: 450 mL  Total OUT: 662 mL    Total NET: 108 mL      08-25-19 @ 07:01  -  08-26-19 @ 00:40  --------------------------------------------------------  IN:    Oral Fluid: 420 mL  Total IN: 420 mL    OUT:    Voided: 800 mL  Total OUT: 800 mL    Total NET: -380 mL          LAB VALUES:  08-25    135  |  103  |  10  ----------------------------<  106<H>  4.0   |  20<L>  |  0.49<L>    Ca    7.3<L>      25 Aug 2019 22:06                                 9.2    10.4  )-----------( 265      ( 25 Aug 2019 22:08 )             29.2       PT/INR - ( 25 Aug 2019 22:04 )   PT: 26.1 sec;   INR: 2.23 ratio         PTT - ( 25 Aug 2019 22:04 )  PTT:34.7 sec      EKG: electronically paced, new T-wave inversions in V3-V6, no ST changes, Left axis deviation      MICROBIOLOGY:    No new microbiology data for review.     RADIOLOGY:    CXR: Clear lungs, no pneumothorax or pleural effusion    MEDICATIONS  (STANDING):  apixaban 5 milliGRAM(s) Oral every 12 hours  atorvastatin 20 milliGRAM(s) Oral at bedtime  buDESOnide  80 MICROgram(s)/formoterol 4.5 MICROgram(s) Inhaler 2 Puff(s) Inhalation two times a day  chlorhexidine 2% Cloths 1 Application(s) Topical daily  cholecalciferol 400 Unit(s) Oral daily  dextrose 5%. 1000 milliLiter(s) (50 mL/Hr) IV Continuous <Continuous>  dextrose 50% Injectable 12.5 Gram(s) IV Push once  dextrose 50% Injectable 25 Gram(s) IV Push once  dextrose 50% Injectable 25 Gram(s) IV Push once  ertapenem  IVPB 1 milliGRAM(s) IV Intermittent every 24 hours  insulin lispro (HumaLOG) corrective regimen sliding scale   SubCutaneous three times a day with meals  insulin lispro (HumaLOG) corrective regimen sliding scale   SubCutaneous at bedtime  levalbuterol Inhalation 0.63 milliGRAM(s) Inhalation every 6 hours  loratadine 10 milliGRAM(s) Oral daily  magnesium oxide 400 milliGRAM(s) Oral three times a day with meals  montelukast 10 milliGRAM(s) Oral daily  pantoprazole    Tablet 40 milliGRAM(s) Oral before breakfast  tetracaine/benzocaine/butamben Spray 1 Spray(s) Topical once  tiotropium 18 MICROgram(s) Capsule 1 Capsule(s) Inhalation daily  tiotropium 18 MICROgram(s) Capsule 1 Capsule(s) Inhalation daily    MEDICATIONS  (PRN):  dextrose 40% Gel 15 Gram(s) Oral once PRN Blood Glucose LESS THAN 70 milliGRAM(s)/deciliter  glucagon  Injectable 1 milliGRAM(s) IntraMuscular once PRN Glucose LESS THAN 70 milligrams/deciliter

## 2019-08-26 NOTE — PROGRESS NOTE ADULT - ASSESSMENT
74 year old female PMH of HFrEF, s/p AICD, asthma, HTN, HLD, atrial fibrillation on eliquis, with previous perforated diverticulosis, Hartmans procedure with multiple RTOR for abd washouts. Admitted 6/30 - 8/2 for feculent peritonitis with septic shock secondary to perforated diverticulitis with hospital course c/b HCAP 2/2 CR Pseudomonas.    CT A/P 8/14 with new left lower ventral abdominal wall air-containing fluid collection with intraperitoneal communication to the level of the sigmoid colon stump and Additional enlargement of the left paracolic gutter collection.   Repeat CT A/P 8/19 with extraluminal contrast consistent with dehiscence of the sigmoid colon stump. IR Drain Cultures with E. coli, AHS and Bacteroides.         Overall, Intraabdominal abscess with sigmoid colon stump dehiscence, positive culture finding, allergy to PCN.     Plan:   --Continue Ertapenem 1g IV Q24H  --will need 4 weeks of therapy with ertapenem  --Recommend CT A/P with IV and PO/Rectal Contrast in 2 weeks from last scan (~9/2/19)  --Recommend CBC with Diff and CMP qWeekly while on antimicrobials. Please have results faxed to [phone]  --Patient was advised to follow up with Dr Goode in the Infectious Diseases Office in 2 weeks time.

## 2019-08-26 NOTE — PROVIDER CONTACT NOTE (OTHER) - BACKGROUND
pt can be incontinence at times   primafit in place
Diverticulitis
pt admitted for wound abcess
pt admitted on 8/14 w/ infected abd wound.
pt admitted on 8/15 w/ abd collection & decreased PO intake.
pt admitted w/ infected abd wound
s/p previous perforated diverticulosis multiple RTOR for abd washouts, aspiration and drainage of abd and 2pigtails placements.
pt admitted for infected ab wound

## 2019-08-26 NOTE — CHART NOTE - NSCHARTNOTEFT_GEN_A_CORE
Nutrition Follow Up Note  Patient seen for: malnutrition follow-up     Chart reviewed, events noted. This is a 74y F with "recent history of ex lap, Lupe's procedure for perforated diverticulitis with sepsis.  She had multiple abdominal washouts and abdomen closed with strattice. She is otherwise hemodynamically stable and afebrile". Discharge pending insurance authorization.     Source: patient, RN     Diet : carbohydrate consistent + Low Fiber diet + Mo BID + No Carb Pro-source BID (ordering from Mechanical Soft diet per preference)    Patient reports fair to poor PO intake which she attributes to dislike of institutionalized foods, pt states "this isn't food I am used to eating". RN reports daughter assists to menu ordering for patient. Pt reports some difficulty with chewing foods, states some of the foods is hard. Pt currently with Dysphagia 2 (mechanical soft) menu at bedside which she has been ordering from. Reports trying pureed diet in the past but didn't find anything she liked. Pt made aware of ability to change diet as needed, wishes to continue with mechanical soft diet. Pt is drinking small amounts Mo supplement, did not have pro-source today. Pt is amendable to trial of Sugar Free Health Shakes (chocolate), states she isn't aware if she had them before. Pt denies any nausea, emesis. colostomy output x 24hrs: 0ml (), 152ml ().      PO intake : 25-50%     Source for PO intake: RN report, nursing flow-sheet      Enteral /Parenteral Nutrition: N/A    No new weight to assess, suspect weight loss given suboptimal PO intake.   Daily Weight in k.9 (08-21)  % Weight Change    Pertinent Medications: MEDICATIONS  (STANDING):  apixaban 5 milliGRAM(s) Oral every 12 hours  atorvastatin 20 milliGRAM(s) Oral at bedtime  buDESOnide  80 MICROgram(s)/formoterol 4.5 MICROgram(s) Inhaler 2 Puff(s) Inhalation two times a day  chlorhexidine 2% Cloths 1 Application(s) Topical daily  cholecalciferol 400 Unit(s) Oral daily  dextrose 5%. 1000 milliLiter(s) (50 mL/Hr) IV Continuous <Continuous>  dextrose 50% Injectable 12.5 Gram(s) IV Push once  dextrose 50% Injectable 25 Gram(s) IV Push once  dextrose 50% Injectable 25 Gram(s) IV Push once  ertapenem  IVPB 1 milliGRAM(s) IV Intermittent every 24 hours  furosemide    Tablet 40 milliGRAM(s) Oral daily  insulin lispro (HumaLOG) corrective regimen sliding scale   SubCutaneous three times a day with meals  insulin lispro (HumaLOG) corrective regimen sliding scale   SubCutaneous at bedtime  levalbuterol Inhalation 0.63 milliGRAM(s) Inhalation every 6 hours  lidocaine   Patch 1 Patch Transdermal daily  loratadine 10 milliGRAM(s) Oral daily  magnesium oxide 400 milliGRAM(s) Oral three times a day with meals  montelukast 10 milliGRAM(s) Oral daily  pantoprazole    Tablet 40 milliGRAM(s) Oral before breakfast  spironolactone 25 milliGRAM(s) Oral daily  tetracaine/benzocaine/butamben Spray 1 Spray(s) Topical once  tiotropium 18 MICROgram(s) Capsule 1 Capsule(s) Inhalation daily  tiotropium 18 MICROgram(s) Capsule 1 Capsule(s) Inhalation daily    MEDICATIONS  (PRN):  dextrose 40% Gel 15 Gram(s) Oral once PRN Blood Glucose LESS THAN 70 milliGRAM(s)/deciliter  glucagon  Injectable 1 milliGRAM(s) IntraMuscular once PRN Glucose LESS THAN 70 milligrams/deciliter    Pertinent Labs:  @ 22:06: Na 135, BUN 10, Cr 0.49<L>, <H>, K+ 4.0, Phos --, Mg --, Alk Phos --, ALT/SGPT --, AST/SGOT --, HbA1c --    Finger Sticks:  POCT Blood Glucose.: 102 mg/dL ( @ 09:27)  POCT Blood Glucose.: 83 mg/dL ( @ 21:15)  POCT Blood Glucose.: 102 mg/dL ( @ 18:24)  POCT Blood Glucose.: 88 mg/dL ( @ 14:19)      Skin per nursing documentation: midline abdomen surgical incision  Edema: +1 dependent, +1 left/right ankle     Estimated Needs:   [ x] no change since previous assessment  [ ] recalculated:     Previous Nutrition Diagnosis: Mild malnutrition   Nutrition Diagnosis is: on going, being addressed with food preferences, nutrition supplements     New Nutrition Diagnosis: N/A       Interventions:   Recommend  1) Consider SLP consult given history of dysphagia. Defer current diet consistency to team, continue Low Fiber, carbohydrate consistent diet modifiers.   2) RD to provide Sugar Free Health Shakes BID to supplement PO intake.   3) Continue Mo BID + No-Carb Pro-source BID for wound healing.    Monitoring and Evaluation:     Continue to monitor Nutritional intake, Tolerance to diet prescription, weights, labs, skin integrity    RD remains available upon request and will follow up per protocol  Estella Queen RD, CDN, Pager # [phone]

## 2019-08-26 NOTE — CONSULT NOTE ADULT - ATTENDING COMMENTS
75 y/o F (Orthodox) with PMH of HFrEF, s/p AICD, asthma, HTN, HLD, atrial fibrillation on Eliquis, with previous perforated diverticulosis, Hartmans procedure with multiple RTOR for abd washouts, closure of abdominal incision with mesh (7/11/19), returns with abd pain found to have LLQ abdominal wall collection and intraabdominal fluid collection; hematology consulted for elevated PT likely 2/2 poor nutritional status and vitamin K deficiency. would give vitamin K 10 mg po daily and monitor PT.  She has required iron infusions in the past to correct anemia and as she is a [employer] and does not accept blood products would limit volume of blood draws and check other nutritional deficiencies.
Patient appears well from a cardiac perspective. When asked about sensation she states she noted a "funny" sensation but has denied over chest pain and cardiac enzymes and EKG were not concerning for ischemia. She appears warm and euvolemic. Renal function and potassium are within normal limits. Agree with restarting lasix and spironolactone today and restart enalapril tomorrow if systolic BP > 90 mmHg. She will be discharged on antibiotics with plans for outpatient CT abdomen to reassess collection. She also has a HF clinic f/u with Dr. Hobson on 9/9/19.    We will sign off but please do not hesitate to call back with any questions or concerns.

## 2019-08-26 NOTE — PROGRESS NOTE ADULT - ATTENDING COMMENTS
I have seen and evaluated the patient and discussed the relevant clinical findings and plan with the surgical housestaff and fellow.  Agree with the above documentation with addenda as noted.     Will consult cardiology to eval EKG changes  Clinically stable, asymptomatic now.  Abdomen soft  Drains with small amount of feculent-purulent output    Clark Alvarado MD

## 2019-08-26 NOTE — PROGRESS NOTE ADULT - SUBJECTIVE AND OBJECTIVE BOX
Afebrile on ertapenum  Breathing comfortably OOB in chair    Afebrile on ertapenum: breathing comfortably supine in bed  EKG changes noted    Vital Signs Last 24 Hrs  T(C): 36.7 (26 Aug 2019 09:40), Max: 37.5 (26 Aug 2019 00:56)  T(F): 98.1 (26 Aug 2019 09:40), Max: 99.5 (26 Aug 2019 00:56)  HR: 92 (26 Aug 2019 09:40) (92 - 102)  BP: 102/66 (26 Aug 2019 09:40) (102/66 - 122/78)  BP(mean): --  RR: 18 (26 Aug 2019 09:40) (18 - 18)  SpO2: 98% (26 Aug 2019 09:40) (97% - 100%)                         9.2    10.4  )-----------( 265      ( 25 Aug 2019 22:08 )             29.2     08-25    135  |  103  |  10  ----------------------------<  106<H>  4.0   |  20<L>  |  0.49<L>    Ca    7.3<L>      25 Aug 2019 22:06    PAST MEDICAL & SURGICAL HISTORY:  Refusal of blood transfusions as patient is Mandaen  Patient is Mandaen  Vertigo  Atrial flutter  Diverticulitis  Cardiomyopathy  Kidney stone  Cardiac Pacemaker  HTN - Hypertension  Gout  Diabetes  Congestive Heart Failure  Asthma  S/P cholecystectomy  AICD (Automatic Cardioverter/Defibrillator) Present: inserted in Aug, 2008. Due for battery change in 1 month. ( [place]) . Inserted by Dr Duffy    Allergies    Coreg (Other)  digoxin (Other; Short breath (Mild to Mod))  IV Contrast (Unknown)  penicillins (Hives)    Intolerances    metoprolol (Other)  Solu-Medrol (Other (Mild to Mod))    FAMILY HISTORY:  Family history of brain tumor    SH: Lives in same house as daughter: fully indeplendent; non smoker  REVIEW OF SYSTEMS  [  ] ROS unobtainable because:    [  ] All other systems negative except as noted below:	    Constitutional:  [ ] fever [ ] chills  [ ] weight loss  [ ] weakness  Skin:  [ ] rash [ ] phlebitis	  Eyes: [ ] icterus [ ] pain  [ ] discharge	  ENMT: [ ] sore throat  [ ] thrush [ ] ulcers [ ] exudates  Respiratory: [ ] dyspnea [ ] hemoptysis [ ] cough [ ] sputum	  Cardiovascular:  [ ] chest pain [ ] palpitations [ ] edema	  Gastrointestinal:  [ ] nausea [ ] vomiting [ ] diarrhea [ ] constipation [ ] pain	  Genitourinary:  [ ] dysuria [ ] frequency [ ] hematuria [ ] discharge [ ] flank pain  [ ] incontinence  Musculoskeletal:  [ ] myalgias [ ] arthralgias [ ] arthritis  [ ] back pain  Neurological:  [ ] headache [ ] seizures  [ ] confusion/altered mental status  Psychiatric:  [ ] anxiety [ ] depression	  Hematology/Lymphatics:  [ ] lymphadenopathy  Endocrine:  [ ] adrenal [ ] thyroid  Allergic/Immunologic:	 [ ] transplant [ ] seasonal      Medications:  MEDICATIONS  (STANDING):  ALBUTerol    90 MICROgram(s) HFA Inhaler 1 Puff(s) Inhalation every 4 hours  ALBUTerol/ipratropium for Nebulization 3 milliLiter(s) Nebulizer every 6 hours  atorvastatin 20 milliGRAM(s) Oral at bedtime  chlorhexidine 2% Cloths 1 Application(s) Topical daily  cholecalciferol 400 Unit(s) Oral daily  dextrose 50% Injectable 12.5 Gram(s) IV Push once  dextrose 50% Injectable 25 Gram(s) IV Push once  dextrose 50% Injectable 25 Gram(s) IV Push once  furosemide    Tablet 40 milliGRAM(s) Oral two times a day  insulin glargine Injectable (LANTUS) 6 Unit(s) SubCutaneous at bedtime  insulin lispro (HumaLOG) corrective regimen sliding scale   SubCutaneous every 6 hours  lactated ringers. 1000 milliLiter(s) (50 mL/Hr) IV Continuous <Continuous>  loratadine 10 milliGRAM(s) Oral daily  magnesium oxide 400 milliGRAM(s) Oral three times a day with meals  magnesium sulfate  IVPB 1 Gram(s) IV Intermittent once  meropenem  IVPB 1000 milliGRAM(s) IV Intermittent every 8 hours  montelukast 10 milliGRAM(s) Oral daily  pantoprazole    Tablet 40 milliGRAM(s) Oral before breakfast  phytonadione   Solution 10 milliGRAM(s) Oral daily  sodium phosphate IVPB 15 milliMole(s) IV Intermittent once  tiotropium 18 MICROgram(s) Capsule 1 Capsule(s) Inhalation daily  tiotropium 18 MICROgram(s) Capsule 1 Capsule(s) Inhalation daily  vancomycin  IVPB 1000 milliGRAM(s) IV Intermittent every 12 hours    MEDICATIONS  (PRN):  dextrose 40% Gel 15 Gram(s) Oral once PRN Blood Glucose LESS THAN 70 milliGRAM(s)/deciliter  glucagon  Injectable 1 milliGRAM(s) IntraMuscular once PRN Glucose LESS THAN 70 milligrams/deciliter  guaiFENesin    Syrup 100 milliGRAM(s) Oral every 6 hours PRN Cough    Vital Signs Last 24 Hrs  T(C): 37.4 (16 Aug 2019 06:08), Max: 37.4 (15 Aug 2019 17:43)  T(F): 99.4 (16 Aug 2019 06:08), Max: 99.4 (15 Aug 2019 17:43)  HR: 109 (16 Aug 2019 06:08) (105 - 109)  BP: 102/64 (16 Aug 2019 06:08) (96/60 - 138/72)  BP(mean): --  RR: 20 (16 Aug 2019 06:08) (18 - 22)  SpO2: 96% (16 Aug 2019 06:08) (95% - 98%)          08-15 @ 07:01  -  08-16 @ 07:00  --------------------------------------------------------  IN: 2090 mL / OUT: 560 mL / NET: 1530 mL      LABS:                        9.9    20.3  )-----------( 278      ( 16 Aug 2019 05:08 )             31.8     08-16    137  |  101  |  40<H>  ----------------------------<  127<H>  4.5   |  23  |  1.20    Ca    8.2<L>      16 Aug 2019 05:06  Phos  2.7     08-16  Mg     1.9     08-16    TPro  7.4  /  Alb  2.5<L>  /  TBili  0.6  /  DBili  x   /  AST  16  /  ALT  10  /  AlkPhos  131<H>  08-14      PT/INR - ( 16 Aug 2019 05:06 )   PT: 21.5 sec;   INR: 1.85 ratio         PTT - ( 16 Aug 2019 05:06 )  PTT:31.4 sec      CULTURES:  Culture Results:   Growth in aerobic bottle: Gram Positive Cocci in Clusters  "Due to technical problems, Proteus sp. will Not be reported as part of  the BCID panel until further notice"  ***Blood Panel PCR results on this specimen are available  approximately 3 hours after the Gram stain result.***  Gram stain, PCR, and/or culture results may not always  correspond due to difference in methodologies.  ************************************************************  This PCR assay was performed using [employer].  The following targets are tested for: Enterococcus,  vancomycin resistant enterococci, Listeria monocytogenes,  coagulase negative staphylococci, S. aureus,  methicillin resistant S. aureus, Streptococcus agalactiae  (Group B), S. pneumoniae, S.pyogenes (Group A),  Acinetobacter baumannii, Enterobacter cloacae, E. coli,  Klebsiella oxytoca, K. pneumoniae, Proteus sp.,  Serratia marcescens, Haemophilus influenzae,  Neisseria meningitidis, Pseudomonas aeruginosa, Candida  albicans, C. glabrata, C krusei, C parapsilosis,  C. tropicalis and the KPC resistance gene. (08-14 @ 17:44)  Culture Results:   No growth to date. (08-14 @ 17:44)    Physical Examination:    General: Non toxic, No acute distress.      HEENT: Pupils equal, reactive to light.  Symmetric.    PULM: Scattered exp rhonchi, no wheeze, good aeration    CVS: Regular rate and rhythm, no murmurs, rubs, or gallops    ABD: Soft, mild diffuse tenderness; ostomly and drains noted    EXT: No edema, nontender    SKIN: Warm and well perfused, no rashes noted.    NEURO: Alert, oriented, interactive, nonfocal    RADIOLOGY REVIEWED PERSONALLY  CXR:    PROCEDURE DATE:  08/16/2019        INTERPRETATION:  CLINICAL INFORMATION: Abnormal lung sounds and wheezing.    TECHNIQUE: Portable AP radiograph of the chest.    COMPARISON: Portable AP radiograph of the chest 8/14/2019. CT abdomen   pelvis 8/14/2019    FINDINGS:    The lungs are clear. No pleural effusion or pneumothorax.    Cardiomediastinal silhouette is normal. Left chest wall 3-lead AICD.    Unremarkable skeletal structures.      IMPRESSION:     Clear lungs        CT chest:    TTE:

## 2019-08-26 NOTE — PROVIDER CONTACT NOTE (OTHER) - ASSESSMENT
. All other VSS. Pt is complaining of pain in her R knee, has history of arthritis. Lidocaine patch in place on knee. Denies SOB or chest pain.

## 2019-08-26 NOTE — PROGRESS NOTE ADULT - SUBJECTIVE AND OBJECTIVE BOX
74y old  Female who presents with a chief complaint of LLQ and Abdominal wall Abscess (26 Aug 2019 00:40)      Interval history:  Afebrile, abdominal pain controlled, tolerating advanced diet, some cough.     Allergies:   Coreg (Other)  digoxin (Other; Short breath (Mild to Mod))  IV Contrast (Unknown)  metoprolol (Other)  penicillins (Hives)  Solu-Medrol (Other (Mild to Mod))    Antimicrobials:  ertapenem  IVPB 1 milliGRAM(s) IV Intermittent every 24 hours      REVIEW OF SYSTEMS:  No chest pain   No N/V  No dysuria   No rash.     Vital Signs Last 24 Hrs  T(C): 37.3 (08-26-19 @ 18:32), Max: 37.5 (08-26-19 @ 00:56)  T(F): 99.2 (08-26-19 @ 18:32), Max: 99.5 (08-26-19 @ 00:56)  HR: 98 (08-26-19 @ 18:32) (92 - 102)  BP: 111/70 (08-26-19 @ 18:32) (102/66 - 122/78)  BP(mean): --  RR: 18 (08-26-19 @ 18:32) (18 - 18)  SpO2: 96% (08-26-19 @ 18:32) (96% - 100%)      PHYSICAL EXAM:  Patient in no acute distress. Alert, awake, sitting in recliner   Cardiovascular: S1S2 normal. + ICD  Lungs: + air entry B/L lung fields.  Gastrointestinal: soft, + ostomy, 2 lt sided drains, midabdomen with dressing.  +Rt sided PICC   External catheter.                           9.2    10.4  )-----------( 265      ( 25 Aug 2019 22:08 )             29.2   08-25    135  |  103  |  10  ----------------------------<  106<H>  4.0   |  20<L>  |  0.49<L>    Ca    7.3<L>      25 Aug 2019 22:06        Radiology:  < from: Xray Chest 1 View- PORTABLE-Urgent (08.25.19 @ 23:47) >  IMPRESSION:    Small left effusion

## 2019-08-26 NOTE — PROGRESS NOTE ADULT - ASSESSMENT
ASSESSMENT  74y F with recent history of ex lap, Lupe's procedure for perforated diverticulitis with sepsis.  She had multiple abdominal washouts and abdomen closed with strattice. She is otherwise hemodynamically stable and afebrile.     Plan:   - ID: Abx - Invanz through PICC, d/c with and obtain f/u CT in 2 weeks, outpatient labs weekly  - Pulmonary: continue duonebs and symbicort  - LFD (consistent carb)  - Trend WBC  - Cardiac workup negative yesterday, will continue to monitor  - Dispo: rehab pending insurance authorization     Green team  [id number]

## 2019-08-26 NOTE — PROGRESS NOTE ADULT - ASSESSMENT
COPD with chronic persistent asthma: patient appears stable at this time with normal RA sats (96%) and comfortable breathing supine. She has scattered rhonchi on exam, yet no wheezing and good aeration. CXR is clear.  Moderate MS and Mild AS on recent TTE and moderate systolic CHF: patient does not appear to be in decompensated CHF at this time; there is no evidence of pulmonary edema radiographically or on physical exam, although cannot exclude interstitial edema. Mild tachycardia likely secondary to intrabdominal infection, albuterol HFA could contribute. Of note, patient is currently on bid lasix and LR at 50 /hour    REC    Cardiology evaluation pending  Continue duoneb qid  Symbicort 80/ bid  Continue abx per ID

## 2019-08-26 NOTE — CONSULT NOTE ADULT - PROBLEM SELECTOR RECOMMENDATION 2
- Continue home medications of Lasix 40 mg PO daily and spironolactone 25 mg daily  - If SBP remains > 90, can restart enalapril 10 mg BID tomorrow  - No BB due to h/o severe bronchospastic disease  - D/c planning to [facility], pending insurance authorization  - She will follow-up in our HF clinic on (attending Dr. Hobson) - Continue home medications of Lasix 40 mg PO daily and spironolactone 25 mg daily  - If SBP remains > 90, can restart enalapril 10 mg BID tomorrow  - No BB due to h/o severe bronchospastic disease  - D/c planning to [facility], pending insurance authorization  - She will follow-up in our HF clinic on 9/9 at 9:15 am (attending Dr. Hobson) - Continue home medications of Lasix 40 mg PO daily and spironolactone 25 mg daily  - If SBP remains > 90, can restart enalapril 10 mg BID tomorrow  - No BB due to h/o severe bronchospastic disease  - D/c planning to [facility], pending insurance authorization  - She will follow-up in our HF clinic on 9/9 at 9am (attending Dr. Hobson)

## 2019-08-26 NOTE — CONSULT NOTE ADULT - PROBLEM SELECTOR RECOMMENDATION 9
- IR Drain Cultures with E. coli, AHS and Bacteroides.   - As per ID will continue IV Invanz (s/p PICC) and repeat CT A/P in 2 weeks

## 2019-08-26 NOTE — CONSULT NOTE ADULT - ASSESSMENT
Ms. Dolan is a 73 YO F with a history of HF with improved EF of 45%, s/p CRT-D, severe asthma, AF on Eliquis, who was recently admitted for perforated diverticulitis, s/p Lupe's procedure with multiple RTOR for abd washouts and closed with strattice, who now presents from rehab for LLQ pain and fevers. She was found to have abdominal wall fluid collections and is s/p IR drainage of fluid collections on 8/16. Also with dehiscence of the sigmoid colon stump, but would be high risk for surgery at this time. Tentative plan for reoperation in 2-4 weeks based on followup imaging. Currently HD stable and euvolemic.

## 2019-08-26 NOTE — PROVIDER CONTACT NOTE (OTHER) - ASSESSMENT
upon assessment, RN notices drainage at the surgical site. pt denies chest pain and sob, VSS, and no signs and symptoms of distress noted. pt's daughter asks to speak with the provider about the drainage. MD made aware, MD Ely at bedside assessing pt with RN, new abd dressing placed by MD ely. [email or web address] CDI. safety maintained. will continue to monitor.

## 2019-08-26 NOTE — CONSULT NOTE ADULT - SUBJECTIVE AND OBJECTIVE BOX
HPI:  Ms. Dolan is a 73 YO F with a history of HF with improved EF of 45%, s/p CRT-D, severe asthma, AF on Eliquis, who was recently admitted for perforated diverticulitis, s/p Lupe's procedure with multiple RTOR for abd washouts and closed with strattice, who now presents from rehab for LLQ pain and fevers. She was found to have abdominal wall fluid collections and is s/p IR drainage of fluid collections on 8/16. IR Drain Cultures with E. coli, AHS and Bacteroides. Also with dehiscence of the sigmoid colon stump. As per ID, would be high risk for surgery at this time and would not likely heal the stump if operated on currently. Tentative plan for reoperation in 2-4 weeks based on followup imaging.     On admission she was also noted to be hypotensive, so her cardiac medications were held, but Lasix and spironolactone were restarted today. Yesterday she was also c/o a "funny feeling in chest" and EKG revealed new TWI in leads V3-V6, but hsTrop was negative. Currently denies any chest discomfort, SOB at rest, orthopnea, lightheadedness, or dizziness.      PAST MEDICAL & SURGICAL HISTORY:  Refusal of blood transfusions as patient is [place]  Patient is [place]  Vertigo  Atrial flutter  Diverticulitis  Cardiomyopathy  Kidney stone  Cardiac Pacemaker  HTN - Hypertension  Gout  Diabetes  Congestive Heart Failure  Asthma  S/P cholecystectomy  AICD (Automatic Cardioverter/Defibrillator) Present: inserted in Aug, 2008. Due for battery change in 1 month. ( [place]) . Inserted by Dr Duffy      REVIEW OF SYSTEMS  14 point ROS negative in detail apart from as documented in HPI.      MEDICATIONS  (STANDING):  apixaban 5 milliGRAM(s) Oral every 12 hours  atorvastatin 20 milliGRAM(s) Oral at bedtime  buDESOnide  80 MICROgram(s)/formoterol 4.5 MICROgram(s) Inhaler 2 Puff(s) Inhalation two times a day  chlorhexidine 2% Cloths 1 Application(s) Topical daily  cholecalciferol 400 Unit(s) Oral daily  dextrose 5%. 1000 milliLiter(s) (50 mL/Hr) IV Continuous <Continuous>  dextrose 50% Injectable 12.5 Gram(s) IV Push once  dextrose 50% Injectable 25 Gram(s) IV Push once  dextrose 50% Injectable 25 Gram(s) IV Push once  ertapenem  IVPB 1 milliGRAM(s) IV Intermittent every 24 hours  furosemide    Tablet 40 milliGRAM(s) Oral daily  insulin lispro (HumaLOG) corrective regimen sliding scale   SubCutaneous three times a day with meals  insulin lispro (HumaLOG) corrective regimen sliding scale   SubCutaneous at bedtime  levalbuterol Inhalation 0.63 milliGRAM(s) Inhalation every 6 hours  lidocaine   Patch 1 Patch Transdermal daily  loratadine 10 milliGRAM(s) Oral daily  magnesium oxide 400 milliGRAM(s) Oral three times a day with meals  montelukast 10 milliGRAM(s) Oral daily  pantoprazole    Tablet 40 milliGRAM(s) Oral before breakfast  spironolactone 25 milliGRAM(s) Oral daily  tetracaine/benzocaine/butamben Spray 1 Spray(s) Topical once  tiotropium 18 MICROgram(s) Capsule 1 Capsule(s) Inhalation daily  tiotropium 18 MICROgram(s) Capsule 1 Capsule(s) Inhalation daily    MEDICATIONS  (PRN):  dextrose 40% Gel 15 Gram(s) Oral once PRN Blood Glucose LESS THAN 70 milliGRAM(s)/deciliter  glucagon  Injectable 1 milliGRAM(s) IntraMuscular once PRN Glucose LESS THAN 70 milligrams/deciliter      Allergies    Coreg (Other)  digoxin (Other; Short breath (Mild to Mod))  IV Contrast (Unknown)  penicillins (Hives)    Intolerances    metoprolol (Other)  Solu-Medrol (Other (Mild to Mod))      SOCIAL HISTORY:    FAMILY HISTORY:  Family history of brain tumor      Vital Signs Last 24 Hrs  T(C): 36.8 (26 Aug 2019 12:52), Max: 37.5 (26 Aug 2019 00:56)  T(F): 98.2 (26 Aug 2019 12:52), Max: 99.5 (26 Aug 2019 00:56)  HR: 92 (26 Aug 2019 12:52) (92 - 102)  BP: 107/71 (26 Aug 2019 12:52) (102/66 - 122/78)  BP(mean): --  RR: 18 (26 Aug 2019 12:52) (18 - 18)  SpO2: 99% (26 Aug 2019 12:52) (97% - 100%)    PHYSICAL EXAM:    General: No distress. Comfortable.  HEENT: EOM intact.  Neck: Neck supple. JVP 8 cm H2O. No masses  Chest: Clear to auscultation bilaterally  CV: Irregularly irregular. Normal S1 and S2. No murmurs, rub, or gallops. Radial pulses normal. No edema.  Abdomen: Soft, non-distended, mildly tender. Ostomy in place and functioning. Strattice in place. 14 Persian pigtail catheter placement in LLQ and 14 Persian pigtail in left retroperitoneal fluid.  Skin: No rashes or skin breakdown  Neurology: Alert and oriented times three. Sensation intact  Psych: Affect normal      LABS:                        9.2    10.4  )-----------( 265      ( 25 Aug 2019 22:08 )             29.2     08-25    135  |  103  |  10  ----------------------------<  106<H>  4.0   |  20<L>  |  0.49<L>    Ca    7.3<L>      25 Aug 2019 22:06      PT/INR - ( 25 Aug 2019 22:04 )   PT: 26.1 sec;   INR: 2.23 ratio         PTT - ( 25 Aug 2019 22:04 )  PTT:34.7 sec      RADIOLOGY & ADDITIONAL STUDIES: HPI:  Ms. Dolan is a 75 YO F with a history of HF with improved EF of 45%, s/p CRT-D, severe asthma, AF on Eliquis, who was recently admitted for perforated diverticulitis, s/p Lupe's procedure with multiple RTOR for abd washouts and closed with strattice, who now presents from rehab for LLQ pain and fevers. She was found to have abdominal wall fluid collections and is s/p IR drainage of fluid collections on 8/16. IR Drain Cultures with E. coli, AHS and Bacteroides. Also with dehiscence of the sigmoid colon stump. As per ID, would be high risk for surgery at this time and would not likely heal the stump if operated on currently. Tentative plan for reoperation in 2-4 weeks based on followup imaging.     On admission she was also noted to be hypotensive, so her cardiac medications were held, but Lasix and spironolactone were restarted today. Yesterday she was also c/o a "funny feeling in chest" and EKG revealed new TWI in leads V3-V6, but hsTrop was negative. Currently denies any chest discomfort, SOB at rest, orthopnea, lightheadedness, or dizziness.      PAST MEDICAL & SURGICAL HISTORY:  Refusal of blood transfusions as patient is Mandaen  Patient is Mandaen  Vertigo  Atrial flutter  Diverticulitis  Cardiomyopathy  Kidney stone  Cardiac Pacemaker  HTN - Hypertension  Gout  Diabetes  Congestive Heart Failure  Asthma  S/P cholecystectomy  AICD (Automatic Cardioverter/Defibrillator) Present: inserted in Aug, 2008. Due for battery change in 1 month. ( [place]) . Inserted by Dr Duffy      REVIEW OF SYSTEMS  14 point ROS negative in detail apart from as documented in HPI.      MEDICATIONS  (STANDING):  apixaban 5 milliGRAM(s) Oral every 12 hours  atorvastatin 20 milliGRAM(s) Oral at bedtime  buDESOnide  80 MICROgram(s)/formoterol 4.5 MICROgram(s) Inhaler 2 Puff(s) Inhalation two times a day  chlorhexidine 2% Cloths 1 Application(s) Topical daily  cholecalciferol 400 Unit(s) Oral daily  dextrose 5%. 1000 milliLiter(s) (50 mL/Hr) IV Continuous <Continuous>  dextrose 50% Injectable 12.5 Gram(s) IV Push once  dextrose 50% Injectable 25 Gram(s) IV Push once  dextrose 50% Injectable 25 Gram(s) IV Push once  ertapenem  IVPB 1 milliGRAM(s) IV Intermittent every 24 hours  furosemide    Tablet 40 milliGRAM(s) Oral daily  insulin lispro (HumaLOG) corrective regimen sliding scale   SubCutaneous three times a day with meals  insulin lispro (HumaLOG) corrective regimen sliding scale   SubCutaneous at bedtime  levalbuterol Inhalation 0.63 milliGRAM(s) Inhalation every 6 hours  lidocaine   Patch 1 Patch Transdermal daily  loratadine 10 milliGRAM(s) Oral daily  magnesium oxide 400 milliGRAM(s) Oral three times a day with meals  montelukast 10 milliGRAM(s) Oral daily  pantoprazole    Tablet 40 milliGRAM(s) Oral before breakfast  spironolactone 25 milliGRAM(s) Oral daily  tetracaine/benzocaine/butamben Spray 1 Spray(s) Topical once  tiotropium 18 MICROgram(s) Capsule 1 Capsule(s) Inhalation daily  tiotropium 18 MICROgram(s) Capsule 1 Capsule(s) Inhalation daily    MEDICATIONS  (PRN):  dextrose 40% Gel 15 Gram(s) Oral once PRN Blood Glucose LESS THAN 70 milliGRAM(s)/deciliter  glucagon  Injectable 1 milliGRAM(s) IntraMuscular once PRN Glucose LESS THAN 70 milligrams/deciliter    HOME MEDICATIONS:  Aspirin 81 MG Oral Tablet Delayed Release; TAKE 1 TABLET BY MOUTH DAILY  Atorvastatin Calcium 20 MG Oral Tablet; TAKE 1 TABLET DAILY AS DIRECTED  Cetirizine HCl - 10 MG Oral Tablet; TAKE 1 TABLET DAILY AS DIRECTED  Eliquis 5 MG Oral Tablet; TAKE TABLET Twice daily  Enalapril Maleate 10 MG Oral Tablet; TAKE 1   TABLET TWICE PER DAY  Furosemide 40 MG Oral Tablet; 1 tab daily  Lantus SoloStar 100 UNIT/ML SOLN; USE AS DIRECTED  Montelukast Sodium 10 MG Oral Tablet; TAKE 1 TABLET DAILY  Omeprazole 40 MG Oral Capsule Delayed Release  Repaglinide 0.5 MG Oral Tablet; TAKE 1 TABLET 3 TIMES DAILY, 15-30 MINUTES  BEFORE MEALS  Spiriva HandiHaler 18 MCG Inhalation Capsule  Spironolactone 25 MG Oral Tablet; Take 1 tablet daily  Symbicort 160-4.5 MCG/ACT Inhalation Aerosol; Take 2 puffs once daily  Ventolin  (90 Base) MCG/ACT Inhalation Aerosol Solution; INHALE 1-2 PUFFS  EVERY 4 TO 6 HOURS AS NEEDED      Allergies    Coreg (Other)  digoxin (Other; Short breath (Mild to Mod))  IV Contrast (Unknown)  penicillins (Hives)    Intolerances    metoprolol (Other)  Solu-Medrol (Other (Mild to Mod))    SOCIAL HISTORY:  Denied: History of Alcohol Use (History)  Denied: History of Current Smoker  Denied: History of Drug Use    FAMILY HISTORY:  Family history of brain tumor      Vital Signs Last 24 Hrs  T(C): 36.8 (26 Aug 2019 12:52), Max: 37.5 (26 Aug 2019 00:56)  T(F): 98.2 (26 Aug 2019 12:52), Max: 99.5 (26 Aug 2019 00:56)  HR: 92 (26 Aug 2019 12:52) (92 - 102)  BP: 107/71 (26 Aug 2019 12:52) (102/66 - 122/78)  BP(mean): --  RR: 18 (26 Aug 2019 12:52) (18 - 18)  SpO2: 99% (26 Aug 2019 12:52) (97% - 100%)    PHYSICAL EXAM:    General: No distress. Comfortable.  HEENT: EOM intact.  Neck: Neck supple. JVP 8 cm H2O. No masses  Chest: Clear to auscultation bilaterally  CV: Irregularly irregular. Normal S1 and S2. No murmurs, rub, or gallops. Radial pulses normal. No edema.  Abdomen: Soft, non-distended, mildly tender. Ostomy in place and functioning. Strattice in place. 14 French pigtail catheter placement in LLQ and 14 French pigtail in left retroperitoneal fluid.  Skin: No rashes or skin breakdown  Neurology: Alert and oriented times three. Sensation intact  Psych: Affect normal      LABS:                        9.2    10.4  )-----------( 265      ( 25 Aug 2019 22:08 )             29.2     08-25    135  |  103  |  10  ----------------------------<  106<H>  4.0   |  20<L>  |  0.49<L>    Ca    7.3<L>      25 Aug 2019 22:06      PT/INR - ( 25 Aug 2019 22:04 )   PT: 26.1 sec;   INR: 2.23 ratio         PTT - ( 25 Aug 2019 22:04 )  PTT:34.7 sec      RADIOLOGY & ADDITIONAL STUDIES:

## 2019-08-27 ENCOUNTER — TRANSCRIPTION ENCOUNTER (OUTPATIENT)
Age: 74
End: 2019-08-27

## 2019-08-27 VITALS
TEMPERATURE: 98 F | SYSTOLIC BLOOD PRESSURE: 149 MMHG | OXYGEN SATURATION: 95 % | HEART RATE: 100 BPM | RESPIRATION RATE: 18 BRPM | DIASTOLIC BLOOD PRESSURE: 86 MMHG

## 2019-08-27 LAB
ANION GAP SERPL CALC-SCNC: 10 MMOL/L — SIGNIFICANT CHANGE UP (ref 5–17)
APTT BLD: 34.3 SEC — SIGNIFICANT CHANGE UP (ref 27.5–36.3)
BUN SERPL-MCNC: 9 MG/DL — SIGNIFICANT CHANGE UP (ref 7–23)
CALCIUM SERPL-MCNC: 8 MG/DL — LOW (ref 8.4–10.5)
CHLORIDE SERPL-SCNC: 99 MMOL/L — SIGNIFICANT CHANGE UP (ref 96–108)
CO2 SERPL-SCNC: 24 MMOL/L — SIGNIFICANT CHANGE UP (ref 22–31)
CREAT SERPL-MCNC: 0.51 MG/DL — SIGNIFICANT CHANGE UP (ref 0.5–1.3)
GLUCOSE SERPL-MCNC: 91 MG/DL — SIGNIFICANT CHANGE UP (ref 70–99)
HCT VFR BLD CALC: 27.1 % — LOW (ref 34.5–45)
HGB BLD-MCNC: 8.1 G/DL — LOW (ref 11.5–15.5)
INR BLD: 2.34 RATIO — HIGH (ref 0.88–1.16)
MAGNESIUM SERPL-MCNC: 1.2 MG/DL — LOW (ref 1.6–2.6)
MCHC RBC-ENTMCNC: 30.1 GM/DL — LOW (ref 32–36)
MCHC RBC-ENTMCNC: 30.3 PG — SIGNIFICANT CHANGE UP (ref 27–34)
MCV RBC AUTO: 101 FL — HIGH (ref 80–100)
PHOSPHATE SERPL-MCNC: 3.2 MG/DL — SIGNIFICANT CHANGE UP (ref 2.5–4.5)
PLATELET # BLD AUTO: 279 K/UL — SIGNIFICANT CHANGE UP (ref 150–400)
POTASSIUM SERPL-MCNC: 3.6 MMOL/L — SIGNIFICANT CHANGE UP (ref 3.5–5.3)
POTASSIUM SERPL-SCNC: 3.6 MMOL/L — SIGNIFICANT CHANGE UP (ref 3.5–5.3)
PROTHROM AB SERPL-ACNC: 27.6 SEC — HIGH (ref 10–12.9)
RBC # BLD: 2.69 M/UL — LOW (ref 3.8–5.2)
RBC # FLD: 18.7 % — HIGH (ref 10.3–14.5)
SODIUM SERPL-SCNC: 133 MMOL/L — LOW (ref 135–145)
TROPONIN T, HIGH SENSITIVITY RESULT: 44 NG/L — SIGNIFICANT CHANGE UP (ref 0–51)
WBC # BLD: 11.7 K/UL — HIGH (ref 3.8–10.5)
WBC # FLD AUTO: 11.7 K/UL — HIGH (ref 3.8–10.5)

## 2019-08-27 PROCEDURE — 85610 PROTHROMBIN TIME: CPT

## 2019-08-27 PROCEDURE — 80053 COMPREHEN METABOLIC PANEL: CPT

## 2019-08-27 PROCEDURE — 85027 COMPLETE CBC AUTOMATED: CPT

## 2019-08-27 PROCEDURE — 84295 ASSAY OF SERUM SODIUM: CPT

## 2019-08-27 PROCEDURE — 96375 TX/PRO/DX INJ NEW DRUG ADDON: CPT

## 2019-08-27 PROCEDURE — 96374 THER/PROPH/DIAG INJ IV PUSH: CPT

## 2019-08-27 PROCEDURE — 74177 CT ABD & PELVIS W/CONTRAST: CPT

## 2019-08-27 PROCEDURE — 85611 PROTHROMBIN TEST: CPT

## 2019-08-27 PROCEDURE — 87070 CULTURE OTHR SPECIMN AEROBIC: CPT

## 2019-08-27 PROCEDURE — 82746 ASSAY OF FOLIC ACID SERUM: CPT

## 2019-08-27 PROCEDURE — 97161 PT EVAL LOW COMPLEX 20 MIN: CPT

## 2019-08-27 PROCEDURE — 10160 PNXR ASPIR ABSC HMTMA BULLA: CPT | Mod: XS

## 2019-08-27 PROCEDURE — 83036 HEMOGLOBIN GLYCOSYLATED A1C: CPT

## 2019-08-27 PROCEDURE — 87150 DNA/RNA AMPLIFIED PROBE: CPT

## 2019-08-27 PROCEDURE — 96372 THER/PROPH/DIAG INJ SC/IM: CPT | Mod: XU

## 2019-08-27 PROCEDURE — 87075 CULTR BACTERIA EXCEPT BLOOD: CPT

## 2019-08-27 PROCEDURE — 80048 BASIC METABOLIC PNL TOTAL CA: CPT

## 2019-08-27 PROCEDURE — 74018 RADEX ABDOMEN 1 VIEW: CPT

## 2019-08-27 PROCEDURE — 85670 THROMBIN TIME PLASMA: CPT

## 2019-08-27 PROCEDURE — 87186 SC STD MICRODIL/AGAR DIL: CPT

## 2019-08-27 PROCEDURE — 85045 AUTOMATED RETICULOCYTE COUNT: CPT

## 2019-08-27 PROCEDURE — 99232 SBSQ HOSP IP/OBS MODERATE 35: CPT

## 2019-08-27 PROCEDURE — 87205 SMEAR GRAM STAIN: CPT

## 2019-08-27 PROCEDURE — 36569 INSJ PICC 5 YR+ W/O IMAGING: CPT

## 2019-08-27 PROCEDURE — C1729: CPT

## 2019-08-27 PROCEDURE — 82435 ASSAY OF BLOOD CHLORIDE: CPT

## 2019-08-27 PROCEDURE — 76937 US GUIDE VASCULAR ACCESS: CPT

## 2019-08-27 PROCEDURE — 94640 AIRWAY INHALATION TREATMENT: CPT

## 2019-08-27 PROCEDURE — 99024 POSTOP FOLLOW-UP VISIT: CPT

## 2019-08-27 PROCEDURE — 10030 IMG GID FLU COLL DRG SFT TIS: CPT

## 2019-08-27 PROCEDURE — 85635 REPTILASE TEST: CPT

## 2019-08-27 PROCEDURE — 82803 BLOOD GASES ANY COMBINATION: CPT

## 2019-08-27 PROCEDURE — 82607 VITAMIN B-12: CPT

## 2019-08-27 PROCEDURE — 82330 ASSAY OF CALCIUM: CPT

## 2019-08-27 PROCEDURE — 93010 ELECTROCARDIOGRAM REPORT: CPT

## 2019-08-27 PROCEDURE — 97530 THERAPEUTIC ACTIVITIES: CPT

## 2019-08-27 PROCEDURE — 76942 ECHO GUIDE FOR BIOPSY: CPT | Mod: XU

## 2019-08-27 PROCEDURE — 87040 BLOOD CULTURE FOR BACTERIA: CPT

## 2019-08-27 PROCEDURE — 99285 EMERGENCY DEPT VISIT HI MDM: CPT | Mod: 25

## 2019-08-27 PROCEDURE — 85730 THROMBOPLASTIN TIME PARTIAL: CPT

## 2019-08-27 PROCEDURE — 75984 XRAY CONTROL CATHETER CHANGE: CPT

## 2019-08-27 PROCEDURE — 71045 X-RAY EXAM CHEST 1 VIEW: CPT

## 2019-08-27 PROCEDURE — 93005 ELECTROCARDIOGRAM TRACING: CPT

## 2019-08-27 PROCEDURE — C1769: CPT

## 2019-08-27 PROCEDURE — 83605 ASSAY OF LACTIC ACID: CPT

## 2019-08-27 PROCEDURE — 80202 ASSAY OF VANCOMYCIN: CPT

## 2019-08-27 PROCEDURE — 82728 ASSAY OF FERRITIN: CPT

## 2019-08-27 PROCEDURE — C1751: CPT

## 2019-08-27 PROCEDURE — 85014 HEMATOCRIT: CPT

## 2019-08-27 PROCEDURE — C1894: CPT

## 2019-08-27 PROCEDURE — 84132 ASSAY OF SERUM POTASSIUM: CPT

## 2019-08-27 PROCEDURE — 97116 GAIT TRAINING THERAPY: CPT

## 2019-08-27 PROCEDURE — 82962 GLUCOSE BLOOD TEST: CPT

## 2019-08-27 PROCEDURE — 84484 ASSAY OF TROPONIN QUANT: CPT

## 2019-08-27 PROCEDURE — 49423 EXCHANGE DRAINAGE CATHETER: CPT

## 2019-08-27 PROCEDURE — 85384 FIBRINOGEN ACTIVITY: CPT

## 2019-08-27 PROCEDURE — 49406 IMAGE CATH FLUID PERI/RETRO: CPT

## 2019-08-27 PROCEDURE — 83735 ASSAY OF MAGNESIUM: CPT

## 2019-08-27 PROCEDURE — 82947 ASSAY GLUCOSE BLOOD QUANT: CPT

## 2019-08-27 PROCEDURE — 84100 ASSAY OF PHOSPHORUS: CPT

## 2019-08-27 RX ORDER — FUROSEMIDE 40 MG
40 TABLET ORAL DAILY
Refills: 0 | Status: DISCONTINUED | OUTPATIENT
Start: 2019-08-27 | End: 2019-08-27

## 2019-08-27 RX ORDER — MAGNESIUM SULFATE 500 MG/ML
2 VIAL (ML) INJECTION ONCE
Refills: 0 | Status: COMPLETED | OUTPATIENT
Start: 2019-08-27 | End: 2019-08-27

## 2019-08-27 RX ORDER — ASPIRIN/CALCIUM CARB/MAGNESIUM 324 MG
1 TABLET ORAL
Qty: 0 | Refills: 0 | DISCHARGE

## 2019-08-27 RX ORDER — INSULIN LISPRO 100/ML
1 VIAL (ML) SUBCUTANEOUS
Qty: 5 | Refills: 0
Start: 2019-08-27

## 2019-08-27 RX ORDER — REPAGLINIDE 1 MG/1
1 TABLET ORAL
Qty: 0 | Refills: 0 | DISCHARGE

## 2019-08-27 RX ORDER — PHYTONADIONE (VIT K1) 5 MG
10 TABLET ORAL ONCE
Refills: 0 | Status: DISCONTINUED | OUTPATIENT
Start: 2019-08-27 | End: 2019-08-27

## 2019-08-27 RX ORDER — LIDOCAINE 4 G/100G
0 CREAM TOPICAL
Qty: 0 | Refills: 0 | DISCHARGE
Start: 2019-08-27

## 2019-08-27 RX ORDER — LEVALBUTEROL 1.25 MG/.5ML
3 SOLUTION, CONCENTRATE RESPIRATORY (INHALATION)
Qty: 0 | Refills: 0 | DISCHARGE
Start: 2019-08-27

## 2019-08-27 RX ORDER — PHYTONADIONE (VIT K1) 5 MG
10 TABLET ORAL DAILY
Refills: 0 | Status: COMPLETED | OUTPATIENT
Start: 2019-08-27 | End: 2019-08-27

## 2019-08-27 RX ORDER — ERTAPENEM SODIUM 1 G/1
0 INJECTION, POWDER, LYOPHILIZED, FOR SOLUTION INTRAMUSCULAR; INTRAVENOUS
Qty: 0 | Refills: 0 | DISCHARGE
Start: 2019-08-27

## 2019-08-27 RX ORDER — ENOXAPARIN SODIUM 100 MG/ML
12 INJECTION SUBCUTANEOUS
Qty: 0 | Refills: 0 | DISCHARGE

## 2019-08-27 RX ORDER — POTASSIUM CHLORIDE 20 MEQ
20 PACKET (EA) ORAL
Refills: 0 | Status: COMPLETED | OUTPATIENT
Start: 2019-08-27 | End: 2019-08-27

## 2019-08-27 RX ADMIN — Medication 400 UNIT(S): at 12:39

## 2019-08-27 RX ADMIN — Medication 20 MILLIEQUIVALENT(S): at 12:48

## 2019-08-27 RX ADMIN — SPIRONOLACTONE 25 MILLIGRAM(S): 25 TABLET, FILM COATED ORAL at 06:46

## 2019-08-27 RX ADMIN — Medication 10 MILLIGRAM(S): at 12:36

## 2019-08-27 RX ADMIN — CHLORHEXIDINE GLUCONATE 1 APPLICATION(S): 213 SOLUTION TOPICAL at 12:48

## 2019-08-27 RX ADMIN — PANTOPRAZOLE SODIUM 40 MILLIGRAM(S): 20 TABLET, DELAYED RELEASE ORAL at 05:08

## 2019-08-27 RX ADMIN — Medication 40 MILLIGRAM(S): at 05:08

## 2019-08-27 RX ADMIN — ERTAPENEM SODIUM 100.02 MILLIGRAM(S): 1 INJECTION, POWDER, LYOPHILIZED, FOR SOLUTION INTRAMUSCULAR; INTRAVENOUS at 12:39

## 2019-08-27 RX ADMIN — LEVALBUTEROL 0.63 MILLIGRAM(S): 1.25 SOLUTION, CONCENTRATE RESPIRATORY (INHALATION) at 12:39

## 2019-08-27 RX ADMIN — Medication 50 GRAM(S): at 06:45

## 2019-08-27 RX ADMIN — CHLORHEXIDINE GLUCONATE 1 APPLICATION(S): 213 SOLUTION TOPICAL at 09:33

## 2019-08-27 RX ADMIN — TIOTROPIUM BROMIDE 1 CAPSULE(S): 18 CAPSULE ORAL; RESPIRATORY (INHALATION) at 12:37

## 2019-08-27 RX ADMIN — BUDESONIDE AND FORMOTEROL FUMARATE DIHYDRATE 2 PUFF(S): 160; 4.5 AEROSOL RESPIRATORY (INHALATION) at 05:09

## 2019-08-27 RX ADMIN — LIDOCAINE 1 PATCH: 4 CREAM TOPICAL at 00:35

## 2019-08-27 RX ADMIN — LORATADINE 10 MILLIGRAM(S): 10 TABLET ORAL at 12:38

## 2019-08-27 RX ADMIN — LEVALBUTEROL 0.63 MILLIGRAM(S): 1.25 SOLUTION, CONCENTRATE RESPIRATORY (INHALATION) at 05:08

## 2019-08-27 RX ADMIN — MAGNESIUM OXIDE 400 MG ORAL TABLET 400 MILLIGRAM(S): 241.3 TABLET ORAL at 09:21

## 2019-08-27 RX ADMIN — LIDOCAINE 1 PATCH: 4 CREAM TOPICAL at 12:49

## 2019-08-27 RX ADMIN — MONTELUKAST 10 MILLIGRAM(S): 4 TABLET, CHEWABLE ORAL at 12:38

## 2019-08-27 RX ADMIN — MAGNESIUM OXIDE 400 MG ORAL TABLET 400 MILLIGRAM(S): 241.3 TABLET ORAL at 12:38

## 2019-08-27 RX ADMIN — APIXABAN 5 MILLIGRAM(S): 2.5 TABLET, FILM COATED ORAL at 05:09

## 2019-08-27 RX ADMIN — Medication 20 MILLIEQUIVALENT(S): at 09:21

## 2019-08-27 NOTE — DISCHARGE NOTE PROVIDER - NSDCFUADDAPPT_GEN_ALL_CORE_FT
--Continue Ertapenem 1g IV Q24H  --will need 4 weeks of therapy with ertapenem  --CT A/P with IV and PO/Rectal Contrast in 2 weeks from last scan (~9/2/19)

## 2019-08-27 NOTE — DISCHARGE NOTE PROVIDER - NSDCFUADDINST_GEN_ALL_CORE_FT
CBC with Diff and CMP qWeekly while on antimicrobials. Please have results faxed to [phone].    Please forward flush IR drains with 5cc NS q8 hours. CBC with Diff and CMP qWeekly while on antimicrobials. Please have results faxed to [phone].    Please forward flush IR drains with 5cc NS q8 hours.    Please administer Vitamin K 10mg oral daily, as per heme/onc and monitor INR. CBC with Diff and CMP qWeekly while on antimicrobials. Please have results faxed to [phone].    Please forward flush IR drains with 5cc NS q8 hours, and follow up with Interventional Radiology as an outpatient.    Please administer Vitamin K 10mg oral daily, as per heme/onc and monitor INR. Daily dressing change to abdomen with Abdominal pad and paper tape.    Monitor daily ostomy output.    CBC with Diff and CMP qWeekly while on antimicrobials. Please have results faxed to [phone].    Please forward flush IR drains with 5cc NS q8 hours, and follow up with Interventional Radiology as an outpatient.    Please administer Vitamin K 10mg oral daily, as per heme/onc and monitor INR.

## 2019-08-27 NOTE — PROVIDER CONTACT NOTE (OTHER) - ACTION/TREATMENT ORDERED:
Pt was encouraged to drink    will continue to monitor pt
will continue to monitor  pt
MD aware. EKG performed. no further interventions at this time.
MD to bedside to assess pt. Recheck BP in 5-10 min and let her know results. Continue to monitor.
Recheck VS in 4 hours per protocol. Draw labs now for CBC, BMP, & troponin. Continue to monitor.
Will discuss course of action with surgical team. Continue to monitor.
As per MD Nevraez, EKG, troponin, bmp, cbc, PTT, INR, and X-ray chest perform as per order. pt in bed resting comfortably. safety maintained. will cont. to monitor.
Will come to bedside to assess pt. No further intervention required. Cont to monitor.
Will increase fluid rate to 100 cc an hour. Recheck VS in 2 hours. No further intervention required.
Will order Tylenol for pain. Recheck all VS 15-30 min after giving Tylenol. Continue to monitor.
continue to monitor no interventions at this time
administer apple juice reassess until FS > 100,

## 2019-08-27 NOTE — DISCHARGE NOTE PROVIDER - NSDCCPTREATMENT_GEN_ALL_CORE_FT
PRINCIPAL PROCEDURE  Procedure: Fluoroscopic drainage of abscess or fluid collection  Findings and Treatment:

## 2019-08-27 NOTE — DISCHARGE NOTE PROVIDER - HOSPITAL COURSE
75 y/o F with Pmhx of HFrEF, s/p AICD, asthma, HTN, HLD, atrial fibrillation on eliquis, with previous perforated diverticulosis, Hartmans procedure with multiple RTOR for abd washouts. Last surgery 7/11/19, closure of incisional hernia with biologic mesh, placement of wound vac. Patient was discharged from hospital August 2nd and sent to rehab. Per the daughter, the patient began to have L sided abd pain and "cellulitis" to L abdomen. She also noted wound vac began to put out brown colored drainage. Reports one fever on Monday of 102.0, no fevers since. She has not been eating due to loss of appetite.    In the ED, WBC 19.8, there was significant delay in obtaining a CT of the abdomen, final read as listed below.      Status post left hemicolectomy and right lower quadrant ostomy. New left lower ventral abdominal wall air-containing fluid collection with  intraperitoneal communication to the level of the sigmoid colon stump. Additional enlargement of the left paracolic gutter collection, now containing air. This raises the possibility of suture dehiscence at the     sigmoid colon stump. Recommend further evaluation with rectal contrast. No bowel obstruction.        Patient was admitted to [place] surgery, made NPO/IVF, given IV abx and infectious disease was consulted as well as IR for drainage of collections.  Heme/onc was consulted for elevated INR 2/2 nutritional deficiencies and given vitamin K.        8/16- Patient underwent left Lower Quadrant Drain Placement x 2 and Right Abdominal Wall Aspiration with IR.  The patient tolerated the procedure well and returned to the surgical floor.  Pulmonology was consulted for dyspnea and recommended DC proventil, add symbicort 80/ bid.  Physical therapy evaluated patient and recommended MATT upon dispo.        8/18- Clinically improving s/p IR drainage of abdominal collections.  No pain reported and tolerating PO.  No fevers.  WBC trending down.    Abdomen soft. Midline wound is granulating. Drains brownish output.        8/21- abdominal wall collections growing alpha hemolytic strep, bacteroides, ecoli and enterococcus faecalis , IV abx continued.  Patient underwent Successful upsizing of left lower quadrant drains to 14 Spanish without incident with IR.        8/24- Dispo planning.  ID recommending ID: Abx - Invanz through PICC, d/c with and obtain f/u CT in 2 weeks, outpatient labs weekly.        8/26- Heart failure team was consulted for aid in management and home diuretics were resumed.        8/27- Patient overnight with mild tachycardia, EKG and labs were sent, unremarkable.  Tachycardia resolved.        On day of discharge, the patient was tolerating diet, ambulating well and pain controlled.  The patient will be discharged to [facility] with outpatient follow up with Dr. Alvarado, IR, ID, as an outpatient.

## 2019-08-27 NOTE — PROVIDER CONTACT NOTE (OTHER) - NAME OF MD/NP/PA/DO NOTIFIED:
LORY barboza
MD Cuba
MD Cuba
MD Eric Nevarez
MD Nancy Bolivar
MD Rubio
MD Rubio
Michael Sandra
Roque Giles MD
Selena GUSTAFSON

## 2019-08-27 NOTE — DISCHARGE NOTE NURSING/CASE MANAGEMENT/SOCIAL WORK - PATIENT PORTAL LINK FT
You can access the FollowMyHealth Patient Portal offered by [facility] by registering at the following website: [email or web address]. By joining [facility]’s FollowMyHealth portal, you will also be able to view your health information using other applications (apps) compatible with our system.

## 2019-08-27 NOTE — PROVIDER CONTACT NOTE (OTHER) - ASSESSMENT
BP 96/63 . Pt denies lightheadedness, SOB, or chest pain. EKG was completed as ordered. Pt resting comfortably in bed.

## 2019-08-27 NOTE — PROGRESS NOTE ADULT - SUBJECTIVE AND OBJECTIVE BOX
SUBJECTIVE:  Patient seen and examined on morning rounds. She in no longer having oozing from the wound. She reported no episodes of chest pressure overnight. She denies nausea, vomiting, or pain at this time and feels comfortable with discharge to rehab facility.    OBJECTIVE:  Vital Signs Last 24 Hrs  T(C): 36.8 (27 Aug 2019 00:49), Max: 37.7 (26 Aug 2019 23:40)  T(F): 98.2 (27 Aug 2019 00:49), Max: 99.9 (26 Aug 2019 23:40)  HR: 102 (27 Aug 2019 01:15) (92 - 108)  BP: 96/63 (27 Aug 2019 01:15) (96/63 - 111/70)  BP(mean): --  RR: 18 (27 Aug 2019 00:49) (18 - 18)  SpO2: 98% (27 Aug 2019 00:49) (96% - 99%)    I&O's Detail    25 Aug 2019 07:01  -  26 Aug 2019 07:00  --------------------------------------------------------  IN:    Oral Fluid: 420 mL  Total IN: 420 mL    OUT:    Voided: 1000 mL  Total OUT: 1000 mL    Total NET: -580 mL      26 Aug 2019 07:01  -  27 Aug 2019 04:38  --------------------------------------------------------  IN:    Oral Fluid: 420 mL    Solution: 50 mL  Total IN: 470 mL    OUT:    Voided: 1150 mL  Total OUT: 1150 mL    Total NET: -680 mL          Inpatient Medications:  MEDICATIONS  (STANDING):  apixaban 5 milliGRAM(s) Oral every 12 hours  atorvastatin 20 milliGRAM(s) Oral at bedtime  buDESOnide  80 MICROgram(s)/formoterol 4.5 MICROgram(s) Inhaler 2 Puff(s) Inhalation two times a day  chlorhexidine 2% Cloths 1 Application(s) Topical daily  chlorhexidine 4% Liquid 1 Application(s) Topical <User Schedule>  cholecalciferol 400 Unit(s) Oral daily  dextrose 5%. 1000 milliLiter(s) (50 mL/Hr) IV Continuous <Continuous>  dextrose 50% Injectable 12.5 Gram(s) IV Push once  dextrose 50% Injectable 25 Gram(s) IV Push once  dextrose 50% Injectable 25 Gram(s) IV Push once  ertapenem  IVPB 1 milliGRAM(s) IV Intermittent every 24 hours  furosemide    Tablet 40 milliGRAM(s) Oral daily  insulin lispro (HumaLOG) corrective regimen sliding scale   SubCutaneous three times a day with meals  insulin lispro (HumaLOG) corrective regimen sliding scale   SubCutaneous at bedtime  levalbuterol Inhalation 0.63 milliGRAM(s) Inhalation every 6 hours  lidocaine   Patch 1 Patch Transdermal daily  loratadine 10 milliGRAM(s) Oral daily  magnesium oxide 400 milliGRAM(s) Oral three times a day with meals  montelukast 10 milliGRAM(s) Oral daily  pantoprazole    Tablet 40 milliGRAM(s) Oral before breakfast  spironolactone 25 milliGRAM(s) Oral daily  tetracaine/benzocaine/butamben Spray 1 Spray(s) Topical once  tiotropium 18 MICROgram(s) Capsule 1 Capsule(s) Inhalation daily  tiotropium 18 MICROgram(s) Capsule 1 Capsule(s) Inhalation daily    MEDICATIONS  (PRN):  acetaminophen   Tablet .. 975 milliGRAM(s) Oral every 6 hours PRN Mild Pain (1 - 3), Moderate Pain (4 - 6)  dextrose 40% Gel 15 Gram(s) Oral once PRN Blood Glucose LESS THAN 70 milliGRAM(s)/deciliter  glucagon  Injectable 1 milliGRAM(s) IntraMuscular once PRN Glucose LESS THAN 70 milligrams/deciliter  sodium chloride 0.9% lock flush 10 milliLiter(s) IV Push every 1 hour PRN Pre/post blood products, medications, blood draw, and to maintain line patency      PHYSICAL EXAM:  General: alert and oriented, NAD  Resp: airway patent, respirations unlabored  CVS: regular rate and rhythm  Abd: soft, mildly tender, nondistended. Ostomy in place and functioning. Strattice in place. 14 Colombian pigtail catheter placement in LLQ and 14 Colombian pigtail in left retroperitoneal fluid  Extremities: WWP    LABS:                        8.1    11.7  )-----------( 279      ( 27 Aug 2019 01:52 )             27.1       08-27    133<L>  |  99  |  9   ----------------------------<  91  3.6   |  24  |  0.51    Ca    8.0<L>      27 Aug 2019 01:52  Phos  3.2     08-27  Mg     1.2     08-27        CULTURES:  .Body Fluid right abdomen wall  08-16 @ 15:21   No growth at 5 days  --    Few polymorphonuclear leukocytes per low power field  No organisms seen per oil power field      .Body Fluid left retroperitoneal  08-16 @ 15:18   Numerous Escherichia coli  Numerous Alpha hemolytic strep "Susceptibilities not performed"  Moderate Bacteroides thetaiotaomicron "Susceptibilities not performed"  Few Enterococcus faecalis  --  Escherichia coli  Enterococcus faecalis      .Blood  08-16 @ 13:08   No growth at 5 days.  --  --      .Blood  08-14 @ 17:44   Growth in aerobic bottle: Coag Negative Staphylococcus  Single set isolate, possible contaminant. Contact  Microbiology if susceptibility testing clinically  indicated.  "Due to technical problems, Proteus sp. will Not be reported as part of  the BCID panel until further notice"  ***Blood Panel PCR results on this specimen are available  approximately 3 hours after the Gram stain result.***  Gram stain, PCR, and/or culture results may not always  correspond due to difference in methodologies.  ************************************************************  This PCR assay was performed using [employer].  The following targets are tested for: Enterococcus,  vancomycin resistant enterococci, Listeria monocytogenes,  coagulase negative staphylococci, S. aureus,  methicillin resistant S. aureus, Streptococcus agalactiae  (Group B), S. pneumoniae, S. pyogenes (Group A),  Acinetobacter baumannii, Enterobacter cloacae, E. coli,  Klebsiella oxytoca, K. pneumoniae, Proteus sp.,  Serratia marcescens, Haemophilus influenzae,  Neisseria meningitidis, Pseudomonas aeruginosa, Candida  albicans, C. glabrata, C krusei, C parapsilosis,  C. tropicalis and the KPC resistance gene.  --  Blood Culture PCR      .Sputum  07-29 @ 04:17   Moderate Pseudomonas aeruginosa (Carbapenem Resistant) #2  Normal Respiratory Liz present  --  Pseudomonas aeruginosa (Carbapenem Resistant)      .Urine  07-24 @ 01:06   No growth  --  --      .Body Fluid  07-23 @ 13:26   Numerous Escherichia coli  Few Coag Negative Staphylococcus  Numerous Escherichia coli #2  --  Escherichia coli  Escherichia coli      .Blood  07-10 @ 20:09   No growth at 5 days.  --  --      .Blood  07-10 @ 20:08   No growth at 5 days.  --  --      .Body Fluid abdominal fluid  07-09 @ 17:54   Few Enterococcus faecalis  Few Coag Negative Staphylococcus  --  Enterococcus faecalis  Coag Negative Staphylococcus      .Body Fluid abdominal fluid  07-09 @ 17:46   Few Enterococcus faecalis  Few Coag Negative Staphylococcus  --  Enterococcus faecalis  Coag Negative Staphylococcus      .Surgical Swab None Specimen Source:  abdomen, esw  07-02 @ 10:15   Moderate Escherichia coli  Moderate Alpha hemolytic strep "Susceptibilities not performed"  Moderate Strep mitis oralis group "Susceptibilities not performed"  Growth in fluid media only Enterococcus faecalis  --  Escherichia coli  Enterococcus faecalis      .Blood  07-01 @ 10:04   No growth at 5 days.  --  --      .Urine  07-01 @ 10:03   No growth  --  --      ASSESSMENT  74y F with recent history of ex lap, Lupe's procedure for perforated diverticulitis with sepsis.  She had multiple abdominal washouts and abdomen closed with strattice. She did have an episode of chest discomfort which subsided quickly and workup was negative. She is recovering well and is ready for dishcarge.    Plan:   - ID: Abx - Invanz through PICC, d/c with and obtain f/u CT in 2 weeks, outpatient labs weekly  - Pulmonary: continue duonebs and symbicort  - LFD (consistent carb)  - Trend WBC  - Dispo: rehab pending insurance authorization     Green team  [id number]

## 2019-08-27 NOTE — PROGRESS NOTE ADULT - ATTENDING COMMENTS
I have seen and evaluated the patient and discussed the relevant clinical findings and plan with the surgical housestaff and fellow.  Agree with the above documentation with addenda as noted.     Clinically stable, no complaints, tolerating PO  Abdomen soft, mildly tender to palpation at drain sites left abdomen  Drains feculent-purulent, small output  Midline wound with dry strattice, surrounding good clean granulation tissue, jamie well.    Cont abx as per ID  Plan to re-image in 1 week -- CT with rectal contrast  Appreciate cardiology input; will monitor HR and replete electrolytes as needed  Wound care  PT/rehab -- awaiting placement      Clark Alvarado MD

## 2019-08-27 NOTE — DISCHARGE NOTE PROVIDER - PROVIDER TOKENS
PROVIDER:[TOKEN:[[id number]],FOLLOWUP:[2 weeks]],PROVIDER:[TOKEN:[[id number]],FOLLOWUP:[1 week]] PROVIDER:[TOKEN:[[id number]],FOLLOWUP:[2 weeks]],PROVIDER:[TOKEN:[[id number]],FOLLOWUP:[1 week]],PROVIDER:[TOKEN:[[id number]],FOLLOWUP:[2 weeks]],PROVIDER:[TOKEN:[[id number]],FOLLOWUP:[Routine]] PROVIDER:[TOKEN:[[id number]],FOLLOWUP:[1 week]],PROVIDER:[TOKEN:[[id number]],FOLLOWUP:[1 week]],PROVIDER:[TOKEN:[[id number]],FOLLOWUP:[2 weeks]],PROVIDER:[TOKEN:[[id number]],FOLLOWUP:[Routine]]

## 2019-08-27 NOTE — PROGRESS NOTE ADULT - PROVIDER SPECIALTY LIST ADULT
Anesthesia
Infectious Disease
Intervent Radiology
Pulmonology
Surgery
Heme/Onc
Surgery
Infectious Disease
Surgery
Surgery

## 2019-08-27 NOTE — PROVIDER CONTACT NOTE (OTHER) - ASSESSMENT
BP 97/62. All other VSS. Pt denies shortness of breathe, lightheadedness. Pt resting comfortably in bed.

## 2019-08-27 NOTE — PROVIDER CONTACT NOTE (OTHER) - SITUATION
BP 96/60
 BP 99/65 temp 98.3
 Respirations 22
BP 96/63 
BP 97/62
Pt 
drainage at surgical site
FS 63 repeat 73

## 2019-08-27 NOTE — DISCHARGE NOTE PROVIDER - NSDCCPCAREPLAN_GEN_ALL_CORE_FT
PRINCIPAL DISCHARGE DIAGNOSIS  Diagnosis: Abdominal pain  Assessment and Plan of Treatment: PRINCIPAL DISCHARGE DIAGNOSIS  Diagnosis: Abdominal pain  Assessment and Plan of Treatment: Please change dressing daily with ABD pad and paper tape  BATHING: Please do not submerge wound underwater. You may shower and/or sponge bathe.  ACTIVITY: No heavy lifting anything more than 10-15lbs or straining. Otherwise, you may return to your usual level of physical activity. If you are taking narcotic pain medication (such as Percocet), do NOT drive a car, operate machinery or make important decisions.  DIET: Diabetic diet as tolerated  NOTIFY YOUR SURGEON IF: You have any bleeding that does not stop, any pus draining from your wound, any fever (over 100.4 F) or chills, persistent nausea/vomiting with inability to tolerate food or liquids, persistent diarrhea, or if your pain is not controlled on your discharge pain medications.  FOLLOW-UP:  1. Please call to make a follow-up appointment within one week of discharge   2. Please follow up with your primary care physician in one week regarding your hospitalization.      SECONDARY DISCHARGE DIAGNOSES  Diagnosis: Hyperlipidemia  Assessment and Plan of Treatment: Please take cholesterol medication as prescribed    Diagnosis: Atrial fibrillation  Assessment and Plan of Treatment: Please take eliquis as directed    Diagnosis: Diverticulitis  Assessment and Plan of Treatment: s/p Hartmanns    Diagnosis: Elevated INR  Assessment and Plan of Treatment: Please give vitamin k PO daily as needed and monitor INR    Diagnosis: Intraabdominal fluid collection  Assessment and Plan of Treatment: Status-post IR drainage, please forward flush catheters with 5-10cc NS q8hrs    Diagnosis: Chronic systolic heart failure  Assessment and Plan of Treatment: Please take diuretics as perscribed and follow up with your Primary care physician or cardiologist as an outpatient

## 2019-08-27 NOTE — DISCHARGE NOTE PROVIDER - CARE PROVIDER_API CALL
Clark Alvarado)  Surgery  [address], [address]  [place], [place] [id number]  Phone: [phone]  Fax: [phone]  Follow Up Time: 2 weeks    Rex Goode)  Internal Medicine  [address] [id number]  Phone: [phone]  Fax: [phone]  Follow Up Time: 1 week Clark Alvarado)  Surgery  [address], [address]  [place], [place] [address]  Phone: [phone]  Fax: [phone]  Follow Up Time: 2 weeks    Rex Goode)  Internal Medicine  [address] [id number]  Phone: [phone]  Fax: [phone]  Follow Up Time: 1 week Clark Alvarado)  Surgery  [address] [facility], [address]  [place], [place] [id number]  Phone: [phone]  Fax: [phone]  Follow Up Time: 2 weeks    Rex Goode)  Internal Medicine  [address] [place] [address]  Phone: [phone]  Fax: [phone]  Follow Up Time: 1 week    Luis Miguel Walker)  Radiology  [address] [place] [address]  Phone: [phone]  Fax: [phone]  Follow Up Time: 2 weeks    Shelly Woodson)  Medical Oncology  [address] [place] [address]  Phone: [phone]  Fax: [phone]  Follow Up Time: Routine Clark Alvarado)  Surgery  [address] [facility], [address]  [place], [place] [address]  Phone: [phone]  Fax: [phone]  Follow Up Time: 1 week    Rxe Goode)  Internal Medicine  [address] [place] [address]  Phone: [phone]  Fax: [phone]  Follow Up Time: 1 week    Luis Miguel Walker)  Radiology  [address] [place] [address]  Phone: [phone]  Fax: [phone]  Follow Up Time: 2 weeks    Shelly Woodson)  Medical Oncology  [address] [place] [address]  Phone: [phone]  Fax: [phone]  Follow Up Time: Routine

## 2019-08-27 NOTE — PROGRESS NOTE ADULT - SUBJECTIVE AND OBJECTIVE BOX
74y old  Female who presents with a chief complaint of Abdominal Pain (27 Aug 2019 07:27)      Interval history:  Afebrile, tired, some cough, abdominal pain controlled, tolerating diet, no N/V.       Allergies:   Coreg (Other)  digoxin (Other; Short breath (Mild to Mod))  IV Contrast (Unknown)  metoprolol (Other)  penicillins (Hives)  Solu-Medrol (Other (Mild to Mod))      Antimicrobials:  ertapenem  IVPB 1 milliGRAM(s) IV Intermittent every 24 hours      REVIEW OF SYSTEMS:  No chest pain   No dysuria   No rash.     Vital Signs Last 24 Hrs  T(C): 36.7 (08-27-19 @ 18:21), Max: 37.7 (08-26-19 @ 23:40)  T(F): 98 (08-27-19 @ 18:21), Max: 99.9 (08-26-19 @ 23:40)  HR: 100 (08-27-19 @ 18:21) (91 - 108)  BP: 149/86 (08-27-19 @ 18:21) (96/63 - 149/86)  BP(mean): --  RR: 18 (08-27-19 @ 18:21) (18 - 18)  SpO2: 95% (08-27-19 @ 18:21) (95% - 98%)      PHYSICAL EXAM:  Patient in no acute distress. Alert, awake, sitting in recliner   Cardiovascular: S1S2 normal. + ICD  Lungs: + air entry B/L lung fields.  Gastrointestinal: soft, + ostomy, 2 lt sided drains, midabdomen with dressing.  +Rt sided PICC   External catheter.                           8.1    11.7  )-----------( 279      ( 27 Aug 2019 01:52 )             27.1   08-27    133<L>  |  99  |  9   ----------------------------<  91  3.6   |  24  |  0.51    Ca    8.0<L>      27 Aug 2019 01:52  Phos  3.2     08-27  Mg     1.2     08-27

## 2019-08-27 NOTE — DISCHARGE NOTE PROVIDER - CARE PROVIDERS DIRECT ADDRESSES
,DirectAddress_Unknown,DirectAddress_Unknown ,DirectAddress_Unknown,DirectAddress_Unknown,DirectAddress_Unknown,[email or web address]

## 2019-08-27 NOTE — PROGRESS NOTE ADULT - ASSESSMENT
74 year old female PMH of HFrEF, s/p AICD, asthma, HTN, HLD, atrial fibrillation on eliquis, with previous perforated diverticulosis, Hartmans procedure with multiple RTOR for abd washouts. Admitted 6/30 - 8/2 for feculent peritonitis with septic shock secondary to perforated diverticulitis with hospital course c/b HCAP 2/2 CR Pseudomonas.    CT A/P 8/14 with new left lower ventral abdominal wall air-containing fluid collection with intraperitoneal communication to the level of the sigmoid colon stump and Additional enlargement of the left paracolic gutter collection.   Repeat CT A/P 8/19 with extraluminal contrast consistent with dehiscence of the sigmoid colon stump. IR Drain Cultures with E. coli, AHS and Bacteroides.     BP on the low side overnight, but pt received diuretics with good response.     Overall, Intraabdominal abscess with sigmoid colon stump dehiscence, positive culture finding, allergy to PCN.     Plan:   --Continue Ertapenem 1g IV Q24H  --will need 4 weeks of therapy with ertapenem  --Recommend CT A/P with IV and PO/Rectal Contrast in 2 weeks from last scan (~9/2/19), pt to follow up with surgery as outpt.   --Recommend CBC with Diff and CMP qWeekly while on antimicrobials. Please have results faxed to [phone]  --Patient was advised to follow up with Dr Goode in the Infectious Diseases Office in 2 weeks time.

## 2019-08-28 ENCOUNTER — INBOUND DOCUMENT (OUTPATIENT)
Age: 74
End: 2019-08-28

## 2019-08-30 ENCOUNTER — EMERGENCY (EMERGENCY)
Facility: HOSPITAL | Age: 74
LOS: 1 days | Discharge: HOME | End: 2019-08-30
Attending: EMERGENCY MEDICINE
Payer: MEDICARE

## 2019-08-30 VITALS
TEMPERATURE: 98 F | OXYGEN SATURATION: 95 % | SYSTOLIC BLOOD PRESSURE: 115 MMHG | DIASTOLIC BLOOD PRESSURE: 73 MMHG | WEIGHT: 179.9 LBS | HEART RATE: 104 BPM

## 2019-08-30 LAB
ANION GAP SERPL CALC-SCNC: 14 MMOL/L — SIGNIFICANT CHANGE UP (ref 5–17)
APTT BLD: 36.1 SEC — SIGNIFICANT CHANGE UP (ref 27.5–36.3)
BUN SERPL-MCNC: 16 MG/DL — SIGNIFICANT CHANGE UP (ref 7–23)
CALCIUM SERPL-MCNC: 8.3 MG/DL — LOW (ref 8.4–10.5)
CHLORIDE SERPL-SCNC: 94 MMOL/L — LOW (ref 96–108)
CO2 SERPL-SCNC: 22 MMOL/L — SIGNIFICANT CHANGE UP (ref 22–31)
CREAT SERPL-MCNC: 0.65 MG/DL — SIGNIFICANT CHANGE UP (ref 0.5–1.3)
GAS PNL BLDV: SIGNIFICANT CHANGE UP
GLUCOSE SERPL-MCNC: 121 MG/DL — HIGH (ref 70–99)
HCT VFR BLD CALC: 28.2 % — LOW (ref 34.5–45)
HGB BLD-MCNC: 8.8 G/DL — LOW (ref 11.5–15.5)
INR BLD: 2.92 RATIO — HIGH (ref 0.88–1.16)
MAGNESIUM SERPL-MCNC: 1.5 MG/DL — LOW (ref 1.6–2.6)
MCHC RBC-ENTMCNC: 30.7 PG — SIGNIFICANT CHANGE UP (ref 27–34)
MCHC RBC-ENTMCNC: 31.2 GM/DL — LOW (ref 32–36)
MCV RBC AUTO: 98.5 FL — SIGNIFICANT CHANGE UP (ref 80–100)
PHOSPHATE SERPL-MCNC: 3.9 MG/DL — SIGNIFICANT CHANGE UP (ref 2.5–4.5)
PLATELET # BLD AUTO: 244 K/UL — SIGNIFICANT CHANGE UP (ref 150–400)
POTASSIUM SERPL-MCNC: 4 MMOL/L — SIGNIFICANT CHANGE UP (ref 3.5–5.3)
POTASSIUM SERPL-SCNC: 4 MMOL/L — SIGNIFICANT CHANGE UP (ref 3.5–5.3)
PROTHROM AB SERPL-ACNC: 34.7 SEC — HIGH (ref 10–12.9)
RBC # BLD: 2.86 M/UL — LOW (ref 3.8–5.2)
RBC # FLD: 18.8 % — HIGH (ref 10.3–14.5)
SODIUM SERPL-SCNC: 130 MMOL/L — LOW (ref 135–145)
WBC # BLD: 13.7 K/UL — HIGH (ref 3.8–10.5)
WBC # FLD AUTO: 13.7 K/UL — HIGH (ref 3.8–10.5)

## 2019-08-30 PROCEDURE — 82330 ASSAY OF CALCIUM: CPT

## 2019-08-30 PROCEDURE — 84295 ASSAY OF SERUM SODIUM: CPT

## 2019-08-30 PROCEDURE — 85027 COMPLETE CBC AUTOMATED: CPT

## 2019-08-30 PROCEDURE — 96374 THER/PROPH/DIAG INJ IV PUSH: CPT | Mod: XU

## 2019-08-30 PROCEDURE — 74177 CT ABD & PELVIS W/CONTRAST: CPT | Mod: 26

## 2019-08-30 PROCEDURE — 85014 HEMATOCRIT: CPT

## 2019-08-30 PROCEDURE — 96375 TX/PRO/DX INJ NEW DRUG ADDON: CPT

## 2019-08-30 PROCEDURE — 83690 ASSAY OF LIPASE: CPT

## 2019-08-30 PROCEDURE — 85610 PROTHROMBIN TIME: CPT

## 2019-08-30 PROCEDURE — 83605 ASSAY OF LACTIC ACID: CPT

## 2019-08-30 PROCEDURE — 99284 EMERGENCY DEPT VISIT MOD MDM: CPT

## 2019-08-30 PROCEDURE — 82947 ASSAY GLUCOSE BLOOD QUANT: CPT

## 2019-08-30 PROCEDURE — 71045 X-RAY EXAM CHEST 1 VIEW: CPT

## 2019-08-30 PROCEDURE — 99284 EMERGENCY DEPT VISIT MOD MDM: CPT | Mod: 25

## 2019-08-30 PROCEDURE — 85730 THROMBOPLASTIN TIME PARTIAL: CPT

## 2019-08-30 PROCEDURE — 87186 SC STD MICRODIL/AGAR DIL: CPT

## 2019-08-30 PROCEDURE — 87070 CULTURE OTHR SPECIMN AEROBIC: CPT

## 2019-08-30 PROCEDURE — 71045 X-RAY EXAM CHEST 1 VIEW: CPT | Mod: 26

## 2019-08-30 PROCEDURE — 84132 ASSAY OF SERUM POTASSIUM: CPT

## 2019-08-30 PROCEDURE — 83735 ASSAY OF MAGNESIUM: CPT

## 2019-08-30 PROCEDURE — 87040 BLOOD CULTURE FOR BACTERIA: CPT

## 2019-08-30 PROCEDURE — 82803 BLOOD GASES ANY COMBINATION: CPT

## 2019-08-30 PROCEDURE — 82435 ASSAY OF BLOOD CHLORIDE: CPT

## 2019-08-30 PROCEDURE — 84100 ASSAY OF PHOSPHORUS: CPT

## 2019-08-30 PROCEDURE — 74177 CT ABD & PELVIS W/CONTRAST: CPT

## 2019-08-30 PROCEDURE — 80076 HEPATIC FUNCTION PANEL: CPT

## 2019-08-30 PROCEDURE — 80048 BASIC METABOLIC PNL TOTAL CA: CPT

## 2019-08-30 RX ORDER — DIPHENHYDRAMINE HCL 50 MG
50 CAPSULE ORAL ONCE
Refills: 0 | Status: COMPLETED | OUTPATIENT
Start: 2019-08-30 | End: 2019-08-30

## 2019-08-30 RX ORDER — SODIUM CHLORIDE 9 MG/ML
1000 INJECTION, SOLUTION INTRAVENOUS
Refills: 0 | Status: DISCONTINUED | OUTPATIENT
Start: 2019-08-30 | End: 2019-09-03

## 2019-08-30 RX ORDER — ACETAMINOPHEN 500 MG
975 TABLET ORAL ONCE
Refills: 0 | Status: COMPLETED | OUTPATIENT
Start: 2019-08-30 | End: 2019-08-30

## 2019-08-30 RX ORDER — SODIUM CHLORIDE 9 MG/ML
500 INJECTION INTRAMUSCULAR; INTRAVENOUS; SUBCUTANEOUS ONCE
Refills: 0 | Status: COMPLETED | OUTPATIENT
Start: 2019-08-30 | End: 2019-08-30

## 2019-08-30 RX ADMIN — Medication 50 MILLIGRAM(S): at 20:27

## 2019-08-30 RX ADMIN — Medication 40 MILLIGRAM(S): at 16:23

## 2019-08-30 RX ADMIN — SODIUM CHLORIDE 500 MILLILITER(S): 9 INJECTION INTRAMUSCULAR; INTRAVENOUS; SUBCUTANEOUS at 15:00

## 2019-08-30 RX ADMIN — Medication 975 MILLIGRAM(S): at 16:23

## 2019-08-30 RX ADMIN — Medication 975 MILLIGRAM(S): at 15:19

## 2019-08-30 RX ADMIN — SODIUM CHLORIDE 50 MILLILITER(S): 9 INJECTION, SOLUTION INTRAVENOUS at 19:23

## 2019-08-30 NOTE — ED PROVIDER NOTE - CLINICAL SUMMARY MEDICAL DECISION MAKING FREE TEXT BOX
75 y/o F with Pmhx of HFrEF, s/p AICD, asthma, HTN, HLD, atrial fibrillation on eliquis, with previous perforated diverticulosis, Hartmans procedure with multiple RTOR for abd washouts, has colostomy and 2 abdominal drains, abdomen is open with overlying mesh presents from rehab for 1 day h/o no output from colostomy bag, and bleeding around mesh in abdomen. On exam, , VS otherwise wnl, purulent drainage from open abdomen. pt states she has had pre-medication in past with benadryl and steroids without a negative rxn despite listed sensitivity unknown IV contrast allergy will premedicate and then CTAP surgery aware of pt and following already received abx today Nicole: 73 y/o F with Pmhx of HFrEF, s/p AICD, asthma, HTN, HLD, atrial fibrillation on eliquis, with previous perforated diverticulosis, Hartmans procedure with multiple RTOR for abd washouts, has colostomy and 2 abdominal drains, abdomen is open with overlying mesh presents from rehab for 1 day h/o no output from colostomy bag, and bleeding around mesh in abdomen. On exam, , VS otherwise wnl, purulent drainage from open abdomen. pt states she has had pre-medication in past with benadryl and steroids without a negative rxn despite listed sensitivity unknown IV contrast allergy will premedicate and then CTAP surgery aware of pt and following already received abx today

## 2019-08-30 NOTE — ED PROVIDER NOTE - PATIENT PORTAL LINK FT
You can access the FollowMyHealth Patient Portal offered by [facility] by registering at the following website: [email or web address]. By joining [employer]’s FollowMyHealth portal, you will also be able to view your health information using other applications (apps) compatible with our system.

## 2019-08-30 NOTE — ED PROVIDER NOTE - PSH
AICD (Automatic Cardioverter/Defibrillator) Present  inserted in Aug, 2008. Due for battery change in 1 month. ( [employer]) . Inserted by Dr Duffy  S/P cholecystectomy

## 2019-08-30 NOTE — ED ADULT NURSE NOTE - PMH
Asthma    Atrial flutter    Cardiac Pacemaker    Cardiomyopathy    Congestive Heart Failure    Diabetes    Diverticulitis    Gout    HTN - Hypertension    Kidney stone    Patient is Congregation    Refusal of blood transfusions as patient is Congregation    Vertigo

## 2019-08-30 NOTE — ED ADULT NURSE NOTE - CHIEF COMPLAINT
The patient is a 74y Female complaining of rectal bleeding. Per paper work from [facility]: isolation= CRE pseudomonas - sputum

## 2019-08-30 NOTE — ED ADULT NURSE NOTE - OBJECTIVE STATEMENT
74 year old female BIBA from [facility] with an iso precaution of CRE pseudomonas-sputum, for "r/o GIB or fistula on the abdominal wound and colostomy" per paperwork from [facility].  Pt went to rehab for LLQ pain/fever and was recently dx with perforated diverticulitis.  Pt was having abdominal fluid collection / dihiscense in the RLQ.  Pt was subsequently admitted for wound care and IV antibiotics.  EMS reports less output in colostomy bag. 74 year old female BIBA from [facility] with an iso precaution of CRE pseudomonas-sputum, for "r/o GIB or fistula on the abdominal wound and colostomy" per paperwork from [facility].  Pt went to rehab for LLQ pain/fever and was recently dx with perforated diverticulitis.  Pt was having abdominal fluid collection / dehiscence in the RLQ.  Pt was subsequently admitted for wound care and IV antibiotics.  EMS reports less output in colostomy bag. +output in colostomy bag. stoma is pink. Abdomen is distended, LARGE open wound to abdominal area brown/red drainage with dehiscence to the 6 o'clock position. decub noted to the 5o'clock position. multiple stage 2 decubiti noted to bilateral buttock area with red drainage . pt extremely sensitive to light palpation of abdomen and buttock area.  Comfort care provided.  2 catheter draining system noted to left side of abdomen. Colostomy bag noted to the right side of abdomen. denies fevers. +wet cough noted. "Contact isolation" confirmed with ID on diagnosis of CRE pseudomonas sputum per [facility] paperwork. Pt has not ambulated since the perforated diverticulitis surgery.  Rt upper arm Power PICC line in place on arrival with pink arm restriction band to right arm . confirmed placement with CXR.  skin warm and dry.

## 2019-08-30 NOTE — CONSULT NOTE ADULT - ASSESSMENT
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 Concern that the abdominal wall abscess is now draining through the open wound.     PLAN:    - CT scan with IV/PO contrast to assess whether patient needs more adequate drainage of previous abscesses/fistula  - Continue Invanz  - Wound culture sent  - INR elevated likely secondary to vitamin K deficiency - pt was given vitamin K today at rehab center. Can continue with vitamin K PO if needed.   - Will follow up imaging and determine final plan based on that.   - Patient discussed with Dr. Chambers

## 2019-08-30 NOTE — ED PROVIDER NOTE - NSFOLLOWUPINSTRUCTIONS_ED_ALL_ED_FT
You were seen in the emergency department for abdominal wound issues. Please follow up with your surgeon. Return to the emergency department immediately if you experience fever, vomiting or any other concerning symptoms.

## 2019-08-30 NOTE — ED ADULT NURSE REASSESSMENT NOTE - NS ED NURSE REASSESS COMMENT FT1
Received report from ED RN June; patient A&Ox3, VSS, picc line patent and site WNL. Abdominal wound cleaned and dressed. Patient made comfortable; pending dispo.

## 2019-08-30 NOTE — ED ADULT NURSE NOTE - ISOLATION INDICATION CONTACT
Pt c/o bilateral foot and ankle pain x 1 week; hx of gout. Ran out of medication x 2 months. Pt also requesting BS checked.
Other Specify

## 2019-08-30 NOTE — ED PROVIDER NOTE - PHYSICAL EXAMINATION
abdomen: b/l drains from flank with scant greenf fluid  colostomy bag with no output, stoma pink, no pus  abdomen open with mesh overlying opening in midline of abdomen with scant blood, large amount of purulent discharge, +ttp no crepitus   +stage II decubitus

## 2019-08-30 NOTE — ED ADULT NURSE NOTE - ED STAT RN HANDOFF DETAILS
report to change of shift rn. isolation contact maintained due to sputum report to change of shift rn. isolation contact maintained due to sputum. wound care provided to abdomen

## 2019-08-30 NOTE — ED PROVIDER NOTE - PROGRESS NOTE DETAILS
Elizabeth Goldberger PGY-3: pt signed out to me pending CTAP, as needed pre-medication for contrast allergy. CTAP returned w slightly decreased size of absc  Significant interval decrease in size of left lower anterior pelvic abscess, also w subq air and fat stranding surrounding perc pigtail + stable-size fluid collection around other catheter. Paged surg for updated recs, awaiting callback Elizabeth Goldberger PGY-3: no callback from surg, repaged Elizabeth Goldberger PGY-3: surg d/w attg, states as CT stable from previous pt ok to be discharged, but that if family uncomfortable w discharge would be open to discussing admission at least for wound care and possibly further study. Pt's family states would prefer for pt to stay in hospital but worried that will lose spot at [facility]. No SW in ED at this time of night. States will attempt to reach staff at [facility] to find out more about bed situation and then decide Elizabeth Goldberger PGY-3: family unable to get any info on bed situation given no SW in-house there either at this time. As such juanpablo given pt's prolonged ED stay recommended admission for further management as well as for inpt SW assistance. Family in agreement. D/w surgery, who state will come discuss and plan Elizabeth Goldberger PGY-3: pt herself now stating she does not want to stay and wants to go back to [place] Elizabeth Goldberger PGY-3: pt herself now stating she does not want to stay and wants to go back to [place]. Family in agreement. Will dc

## 2019-08-30 NOTE — CONSULT NOTE ADULT - SUBJECTIVE AND OBJECTIVE BOX
GENERAL SURGERY CONSULT NOTE  --------------------------------------------------------------------------------------------    Patient is a 74y old Female who presents with a chief complaint of draining abdominal wound.    HPI: 74F with PSH of Lupe's for Hinchey IV perforated diverticulitis of the transverse colon on 7/2/19 with RTOR for multiple washouts and difficulty closing abdominal wall due to bowel edema and fascial retraction who then had strattice placed to close the abdomen with a vac over the strattice. Pt was recently admitted for new left lower ventral abdominal wall fluid collection with intraperitoneal communication at the level of the sigmoid colon and a left paracolic gutter collection s/p IR drainage of both collections (discharged to rehab with Invanz and PICC line). Pt now presents to the ER from Rehab with new drainage from the inferior portion of the wound. Drainage looks similar to what was coming out of the two IR drains. Pt's daughter reports no fevers, but thinks the pt has had decreased PO intake and been more fatigued the last 2 days. +ostomy function (stool and gas). No nausea, vomiting, fever, chills, lightheadedness, SOB, chest pain.     ROS: 10-system review is otherwise negative except HPI above.      PAST MEDICAL & SURGICAL HISTORY:  Refusal of blood transfusions as patient is [employer]  Patient is [employer]  Vertigo  Atrial flutter  Diverticulitis  Cardiomyopathy  Kidney stone  Cardiac Pacemaker  HTN - Hypertension  Gout  Diabetes  Congestive Heart Failure  Asthma  S/P cholecystectomy  AICD (Automatic Cardioverter/Defibrillator) Present: inserted in Aug, 2008. Due for battery change in 1 month. ( [employer]) . Inserted by Dr Duffy    FAMILY HISTORY:  Family history of brain tumor    [x] Family history not pertinent as reviewed with the patient and family    SOCIAL HISTORY: non-smoker. no alcohol use. no drug use. currently at rehab.     ALLERGIES: Coreg (Other)  digoxin (Other; Short breath (Mild to Mod))  IV Contrast (Unknown)  metoprolol (Other)  penicillins (Hives)  Solu-Medrol (Other (Mild to Mod))    HOME MEDICATIONS:   Tylenol  Eliquis 5 mg BID  ISS  Zyrtec 10 mg daily  Atorvastatin 20 mg daily  DuoNeb q6 hrs PRN  Levalbuterol q6 hrs  Tiotropium 18 mcg  Ventolin HFA 90 mcg/inh 2 puffs q4-6 hrs PRN  Ertapenem 1g   Lidocaine 5% topical film  Furosemide 40 mg BID  Spironolactone 25 mg daily  Guaifenesin 100 mg q6 hrs PRN cough  Pepcid 10 mg daily  Montelukast 10 mg daily  Magnesium oxide 500 mg TID  KCl 20 mmEq/15 mL - 5 mL daily PRN cramping  omeprazole 40 mg daily  cholecalciferol daily  vitamin C daily    CURRENT MEDICATIONS  MEDICATIONS (STANDING): lactated ringers. 1000 milliLiter(s) IV Continuous <Continuous>    MEDICATIONS (PRN):  --------------------------------------------------------------------------------------------    Vitals:   T(C): 36.9 (08-30-19 @ 13:44), Max: 36.9 (08-30-19 @ 13:44)  HR: 104 (08-30-19 @ 13:44) (104 - 104)  BP: 115/73 (08-30-19 @ 13:44) (115/73 - 115/73)  RR: --  SpO2: 95% (08-30-19 @ 13:44) (95% - 95%)  CAPILLARY BLOOD GLUCOSE        CAPILLARY BLOOD GLUCOSE      Weight (kg): 81.6 (08-30 @ 13:44)    PHYSICAL EXAM:   General: NAD, Lying in bed comfortably  Neuro: A+Ox3  HEENT: NC/AT, EOMI  Neck: Soft, supple  Cardio: RRR, nml S1/S2  Resp: Good effort, CTA b/l  Thorax: No chest wall tenderness  GI/Abd: Soft, non-distended, large open wound on abdomen with granulation tissue. 1 cm opening at inferior portion of wound draining purulent material. drains have purulent material (same as what is draining from wound) coming out. ostomy pink/viable with gas and stool in bag.   Vascular: All 4 extremities warm.  Musculoskeletal: All 4 extremities moving spontaneously, no limitations  --------------------------------------------------------------------------------------------    LABS  CBC (08-30 @ 16:09)                              8.8<L>                         13.7<H>  )----------------(  244        --    % Neutrophils, --    % Lymphocytes, ANC: --                                  28.2<L>    BMP (08-30 @ 16:09)             130<L>  |  94<L>   |  16    		Ca++ --      Ca 8.3<L>             ---------------------------------( 121<H>		Mg 1.5<L>             4.0     |  22      |  0.65  			Ph 3.9       LFTs (08-30 @ 16:09)      TPro 6.6 / Alb 2.0<L> / TBili 0.4 / DBili 0.1 / AST 13 / ALT 11 / AlkPhos 119    Coags (08-30 @ 16:09)  aPTT 36.1 / INR 2.92<H> / PT 34.7<H>        VBG (08-30 @ 16:44)     7.40 / 45 / 40 / 28 / 2.9<H> / 64<L>%     Lactate: 1.2    --------------------------------------------------------------------------------------------    MICROBIOLOGY      --------------------------------------------------------------------------------------------

## 2019-08-30 NOTE — ED PROVIDER NOTE - PMH
Asthma    Atrial flutter    Cardiac Pacemaker    Cardiomyopathy    Congestive Heart Failure    Diabetes    Diverticulitis    Gout    HTN - Hypertension    Kidney stone    Patient is Confucianist    Refusal of blood transfusions as patient is Confucianist    Vertigo

## 2019-08-30 NOTE — ED ADULT NURSE NOTE - NSIMPLEMENTINTERV_GEN_ALL_ED
Implemented All Fall Risk Interventions:  [place] to call system. Call bell, personal items and telephone within reach. Instruct patient to call for assistance. Room bathroom lighting operational. Non-slip footwear when patient is off stretcher. Physically safe environment: no spills, clutter or unnecessary equipment. Stretcher in lowest position, wheels locked, appropriate side rails in place. Provide visual cue, wrist band, yellow gown, etc. Monitor gait and stability. Monitor for mental status changes and reorient to person, place, and time. Review medications for side effects contributing to fall risk. Reinforce activity limits and safety measures with patient and family.

## 2019-08-30 NOTE — ED CLERICAL - NS ED CLERK NOTE PRE-ARRIVAL INFORMATION; ADDITIONAL PRE-ARRIVAL INFORMATION
CC/Reason For referral: cellulitis @ surgical site, r/o fistula and GIB  Preferred Consultant(if applicable):  Who admits for you (if needed):  Dr Osorio notified  Do you have documents you would like to fax over? sent w/pt  Would you still like to speak to an ED attending? YES    on Eliquis INR 2.9 s/p Vitamin K prior to transfer to ER

## 2019-08-30 NOTE — ED PROVIDER NOTE - OBJECTIVE STATEMENT
73 y/o F with Pmhx of HFrEF, s/p AICD, asthma, HTN, HLD, atrial fibrillation on eliquis, with previous perforated diverticulosis, Hartmans procedure with multiple RTOR for abd washouts, has colostomy and 2 abdominal drains, abdomen is open with overlying mesh presents from rehab for 1 day h/o no output from colostomy bag, and bleeding around mesh in abdomen. received ertapenem IV today. also reports abdominal pain. had normal colostomy output yesterday. received IV vitamin K for elevated INR in setting of bleeding around mesh

## 2019-08-31 VITALS
RESPIRATION RATE: 16 BRPM | SYSTOLIC BLOOD PRESSURE: 104 MMHG | OXYGEN SATURATION: 100 % | TEMPERATURE: 97 F | HEART RATE: 79 BPM | DIASTOLIC BLOOD PRESSURE: 55 MMHG

## 2019-08-31 NOTE — CHART NOTE - NSCHARTNOTEFT_GEN_A_CORE
GENERAL SURGERY CHART NOTE    CT scan findings reviewed and discussed with Dr. Chambers  Pelvic collection improved, left paracolic gutter infection stable  Long discussion with daughter and patient about admission vs return to rehab. Risks and benefits discussed including having wound care see her and possible tube study. Concerns expressed over losing the bed at rehab if admitted. Patient and daughter decided to return to rehab and in agreement with outpatient follow up for drain study.    Elizabeth Sanches, PGY-4

## 2019-09-01 LAB
-  AMIKACIN: SIGNIFICANT CHANGE UP
-  AMOXICILLIN/CLAVULANIC ACID: SIGNIFICANT CHANGE UP
-  AMPICILLIN/SULBACTAM: SIGNIFICANT CHANGE UP
-  AMPICILLIN: SIGNIFICANT CHANGE UP
-  AZTREONAM: SIGNIFICANT CHANGE UP
-  CEFAZOLIN: SIGNIFICANT CHANGE UP
-  CEFEPIME: SIGNIFICANT CHANGE UP
-  CEFOXITIN: SIGNIFICANT CHANGE UP
-  CEFTRIAXONE: SIGNIFICANT CHANGE UP
-  CIPROFLOXACIN: SIGNIFICANT CHANGE UP
-  ERTAPENEM: SIGNIFICANT CHANGE UP
-  GENTAMICIN: SIGNIFICANT CHANGE UP
-  IMIPENEM: SIGNIFICANT CHANGE UP
-  LEVOFLOXACIN: SIGNIFICANT CHANGE UP
-  MEROPENEM: SIGNIFICANT CHANGE UP
-  PIPERACILLIN/TAZOBACTAM: SIGNIFICANT CHANGE UP
-  TOBRAMYCIN: SIGNIFICANT CHANGE UP
-  TRIMETHOPRIM/SULFAMETHOXAZOLE: SIGNIFICANT CHANGE UP
METHOD TYPE: SIGNIFICANT CHANGE UP

## 2019-09-01 NOTE — ED POST DISCHARGE NOTE - RESULT SUMMARY
Prelim cx + few E. coli, final results pending. Wound culture: few E. coli and rare enterococcus, rare yeast

## 2019-09-01 NOTE — ED POST DISCHARGE NOTE - OTHER COMMUNICATION
9/1/19: Prelim results as above. Will await final sensitivities to determine need for contact. - Rupert Costa PA-C 9/1/19: Prelim cx + few E. coli, final results pending. Prelim results as above. Will await final sensitivities to determine need for contact. - Rupert Costa PA-C

## 2019-09-01 NOTE — ED POST DISCHARGE NOTE - DETAILS
9/2/19: spoke with LORY Parsons at [facility] requesting results to be faxed to [phone] and will follow up from there. -Shira More PA-C

## 2019-09-01 NOTE — ED POST DISCHARGE NOTE - ADDITIONAL DOCUMENTATION
9/5 Final culture resulted as above. Patient was admitted to [facility] on 9/3 and is receiving proper antibiotics. No need to contact patient or team. -Doug Nielson PA-C

## 2019-09-02 ENCOUNTER — INPATIENT (INPATIENT)
Facility: HOSPITAL | Age: 74
LOS: 6 days | Discharge: NURSING, REHAB OR LONG-TERM CARE | End: 2019-09-09
Attending: STUDENT IN AN ORGANIZED HEALTH CARE EDUCATION/TRAINING PROGRAM | Admitting: STUDENT IN AN ORGANIZED HEALTH CARE EDUCATION/TRAINING PROGRAM
Payer: MEDICARE

## 2019-09-02 VITALS
TEMPERATURE: 97 F | SYSTOLIC BLOOD PRESSURE: 143 MMHG | DIASTOLIC BLOOD PRESSURE: 74 MMHG | HEART RATE: 134 BPM | OXYGEN SATURATION: 99 % | RESPIRATION RATE: 18 BRPM

## 2019-09-02 LAB
-  AMPICILLIN: SIGNIFICANT CHANGE UP
-  TETRACYCLINE: SIGNIFICANT CHANGE UP
-  VANCOMYCIN: SIGNIFICANT CHANGE UP
ALBUMIN SERPL ELPH-MCNC: 2.8 G/DL — LOW (ref 3.3–5)
ALP SERPL-CCNC: 130 U/L — HIGH (ref 40–120)
ALT FLD-CCNC: 8 U/L — SIGNIFICANT CHANGE UP (ref 4–33)
ANION GAP SERPL CALC-SCNC: 17 MMO/L — HIGH (ref 7–14)
ANISOCYTOSIS BLD QL: SLIGHT — SIGNIFICANT CHANGE UP
APTT BLD: 31.3 SEC — SIGNIFICANT CHANGE UP (ref 27.5–36.3)
AST SERPL-CCNC: 12 U/L — SIGNIFICANT CHANGE UP (ref 4–32)
BASE EXCESS BLDV CALC-SCNC: 3.5 MMOL/L — SIGNIFICANT CHANGE UP
BASOPHILS # BLD AUTO: 0.04 K/UL — SIGNIFICANT CHANGE UP (ref 0–0.2)
BASOPHILS NFR BLD AUTO: 0.3 % — SIGNIFICANT CHANGE UP (ref 0–2)
BASOPHILS NFR SPEC: 0 % — SIGNIFICANT CHANGE UP (ref 0–2)
BILIRUB SERPL-MCNC: 0.3 MG/DL — SIGNIFICANT CHANGE UP (ref 0.2–1.2)
BLASTS # FLD: 0 % — SIGNIFICANT CHANGE UP (ref 0–0)
BLOOD GAS VENOUS - CREATININE: 0.56 MG/DL — SIGNIFICANT CHANGE UP (ref 0.5–1.3)
BUN SERPL-MCNC: 14 MG/DL — SIGNIFICANT CHANGE UP (ref 7–23)
CALCIUM SERPL-MCNC: 9 MG/DL — SIGNIFICANT CHANGE UP (ref 8.4–10.5)
CHLORIDE BLDV-SCNC: 104 MMOL/L — SIGNIFICANT CHANGE UP (ref 96–108)
CHLORIDE SERPL-SCNC: 99 MMOL/L — SIGNIFICANT CHANGE UP (ref 98–107)
CO2 SERPL-SCNC: 24 MMOL/L — SIGNIFICANT CHANGE UP (ref 22–31)
CREAT SERPL-MCNC: 0.67 MG/DL — SIGNIFICANT CHANGE UP (ref 0.5–1.3)
EOSINOPHIL # BLD AUTO: 0.03 K/UL — SIGNIFICANT CHANGE UP (ref 0–0.5)
EOSINOPHIL NFR BLD AUTO: 0.2 % — SIGNIFICANT CHANGE UP (ref 0–6)
EOSINOPHIL NFR FLD: 0.9 % — SIGNIFICANT CHANGE UP (ref 0–6)
GAS PNL BLDV: 136 MMOL/L — SIGNIFICANT CHANGE UP (ref 136–146)
GLUCOSE BLDV-MCNC: 143 MG/DL — HIGH (ref 70–99)
GLUCOSE SERPL-MCNC: 135 MG/DL — HIGH (ref 70–99)
HCO3 BLDV-SCNC: 27 MMOL/L — SIGNIFICANT CHANGE UP (ref 20–27)
HCT VFR BLD CALC: 28 % — LOW (ref 34.5–45)
HCT VFR BLDV CALC: 28.8 % — LOW (ref 34.5–45)
HGB BLD-MCNC: 8.8 G/DL — LOW (ref 11.5–15.5)
HGB BLDV-MCNC: 9.3 G/DL — LOW (ref 11.5–15.5)
IMM GRANULOCYTES NFR BLD AUTO: 1.4 % — SIGNIFICANT CHANGE UP (ref 0–1.5)
INR BLD: 1.71 — HIGH (ref 0.88–1.17)
LACTATE BLDV-MCNC: 1.8 MMOL/L — SIGNIFICANT CHANGE UP (ref 0.5–2)
LYMPHOCYTES # BLD AUTO: 1.55 K/UL — SIGNIFICANT CHANGE UP (ref 1–3.3)
LYMPHOCYTES # BLD AUTO: 9.7 % — LOW (ref 13–44)
LYMPHOCYTES NFR SPEC AUTO: 10.4 % — LOW (ref 13–44)
MACROCYTES BLD QL: SLIGHT — SIGNIFICANT CHANGE UP
MCHC RBC-ENTMCNC: 29.7 PG — SIGNIFICANT CHANGE UP (ref 27–34)
MCHC RBC-ENTMCNC: 31.4 % — LOW (ref 32–36)
MCV RBC AUTO: 94.6 FL — SIGNIFICANT CHANGE UP (ref 80–100)
METAMYELOCYTES # FLD: 0 % — SIGNIFICANT CHANGE UP (ref 0–1)
METHOD TYPE: SIGNIFICANT CHANGE UP
MONOCYTES # BLD AUTO: 1.45 K/UL — HIGH (ref 0–0.9)
MONOCYTES NFR BLD AUTO: 9.1 % — SIGNIFICANT CHANGE UP (ref 2–14)
MONOCYTES NFR BLD: 14.8 % — HIGH (ref 2–9)
MYELOCYTES NFR BLD: 0 % — SIGNIFICANT CHANGE UP (ref 0–0)
NEUTROPHIL AB SER-ACNC: 67.8 % — SIGNIFICANT CHANGE UP (ref 43–77)
NEUTROPHILS # BLD AUTO: 12.7 K/UL — HIGH (ref 1.8–7.4)
NEUTROPHILS NFR BLD AUTO: 79.3 % — HIGH (ref 43–77)
NEUTS BAND # BLD: 3.5 % — SIGNIFICANT CHANGE UP (ref 0–6)
NRBC # BLD: 1 /100WBC — SIGNIFICANT CHANGE UP
NRBC # FLD: 0.02 K/UL — SIGNIFICANT CHANGE UP (ref 0–0)
NT-PROBNP SERPL-SCNC: 638.7 PG/ML — SIGNIFICANT CHANGE UP
OTHER - HEMATOLOGY %: 0 — SIGNIFICANT CHANGE UP
PCO2 BLDV: 45 MMHG — SIGNIFICANT CHANGE UP (ref 41–51)
PH BLDV: 7.41 PH — SIGNIFICANT CHANGE UP (ref 7.32–7.43)
PLATELET # BLD AUTO: 281 K/UL — SIGNIFICANT CHANGE UP (ref 150–400)
PLATELET COUNT - ESTIMATE: NORMAL — SIGNIFICANT CHANGE UP
PMV BLD: 9 FL — SIGNIFICANT CHANGE UP (ref 7–13)
PO2 BLDV: 41 MMHG — HIGH (ref 35–40)
POTASSIUM BLDV-SCNC: 3.1 MMOL/L — LOW (ref 3.4–4.5)
POTASSIUM SERPL-MCNC: 3.4 MMOL/L — LOW (ref 3.5–5.3)
POTASSIUM SERPL-SCNC: 3.4 MMOL/L — LOW (ref 3.5–5.3)
PROMYELOCYTES # FLD: 0 % — SIGNIFICANT CHANGE UP (ref 0–0)
PROT SERPL-MCNC: 7.3 G/DL — SIGNIFICANT CHANGE UP (ref 6–8.3)
PROTHROM AB SERPL-ACNC: 19.8 SEC — HIGH (ref 9.8–13.1)
RBC # BLD: 2.96 M/UL — LOW (ref 3.8–5.2)
RBC # FLD: 20.6 % — HIGH (ref 10.3–14.5)
SAO2 % BLDV: 64.5 % — SIGNIFICANT CHANGE UP (ref 60–85)
SODIUM SERPL-SCNC: 140 MMOL/L — SIGNIFICANT CHANGE UP (ref 135–145)
TROPONIN T, HIGH SENSITIVITY: 47 NG/L — SIGNIFICANT CHANGE UP (ref ?–14)
TROPONIN T, HIGH SENSITIVITY: 56 NG/L — CRITICAL HIGH (ref ?–14)
VARIANT LYMPHS # BLD: 2.6 % — SIGNIFICANT CHANGE UP
WBC # BLD: 16 K/UL — HIGH (ref 3.8–10.5)
WBC # FLD AUTO: 16 K/UL — HIGH (ref 3.8–10.5)

## 2019-09-02 PROCEDURE — 71045 X-RAY EXAM CHEST 1 VIEW: CPT | Mod: 26

## 2019-09-02 RX ORDER — DIPHENHYDRAMINE HCL 50 MG
50 CAPSULE ORAL ONCE
Refills: 0 | Status: COMPLETED | OUTPATIENT
Start: 2019-09-02 | End: 2019-09-02

## 2019-09-02 RX ORDER — SODIUM CHLORIDE 9 MG/ML
500 INJECTION INTRAMUSCULAR; INTRAVENOUS; SUBCUTANEOUS ONCE
Refills: 0 | Status: COMPLETED | OUTPATIENT
Start: 2019-09-02 | End: 2019-09-02

## 2019-09-02 RX ORDER — ACETAMINOPHEN 500 MG
650 TABLET ORAL ONCE
Refills: 0 | Status: COMPLETED | OUTPATIENT
Start: 2019-09-02 | End: 2019-09-02

## 2019-09-02 RX ADMIN — Medication 125 MILLIGRAM(S): at 21:20

## 2019-09-02 RX ADMIN — SODIUM CHLORIDE 500 MILLILITER(S): 9 INJECTION INTRAMUSCULAR; INTRAVENOUS; SUBCUTANEOUS at 19:42

## 2019-09-02 NOTE — ED ADULT NURSE REASSESSMENT NOTE - NS ED NURSE REASSESS COMMENT FT1
Pt. is awaiting CT at present. As per MD Doyle, ok to give solumedrol at this time. Pt. has solumedrol listed as intolerance. As per daughter at bedside, pt. had 1 episode of hallucinations after receiving medication but has received it multiple times since that episode and tolerated well. Medication given as per order.

## 2019-09-02 NOTE — ED PROVIDER NOTE - PROGRESS NOTE DETAILS
pt has remained stable while being here without increased work of breathing, not hypoxic, sleeping comfortably at this time, trop was elevated to 47, repeat to 56, cards already evaluated patient for pacemaker malfunction but their eval was normal, will contact again regarding elevated trop, no active chest pain, remains tachycardic but less so than before in the low 100s, WBC to 16, in the setting of recent abdominal surgery and poor historian will get CT (pending at this time as pt has contrast allergy and needs pre-medicated)- continue to reassess, CT results pending  Radha Doyle, PGY-3 EM pt has remained stable while being here without increased work of breathing, not hypoxic, sleeping comfortably at this time. Had single episode of 11 beats of Vtach on tele. trop was elevated to 47, repeat to 56, cards already evaluated patient for pacemaker malfunction but their eval was normal, will contact again regarding elevated trop, no active chest pain, remains tachycardic but less so than before in the low 100s, WBC to 16, in the setting of recent abdominal surgery and poor historian will get CT (pending at this time as pt has contrast allergy and needs pre-medicated)- continue to reassess, CT results pending  Radha Doyle, PGY-3 EM

## 2019-09-02 NOTE — ED PROVIDER NOTE - NS ED ROS FT
Constitutional: No fever. no chills.   Eyes: No visual changes,   HEENT: No throat pain, hearing changes  CV: No chest pain  Resp: No shortness of breath, no cough  GI: No abdominal pain. No nausea. no  vomiting.   : No dysuria, hematuria.  MSK: +back pain (decubitus)  Skin: +decubitus   Neuro: No headache. No numbness or tingling. No weakness. No dizziness.  Heme: no easy bruising.  Allergy/Immunology: multiple medication allergies, IV contrast (can have contrast after pretreatment)

## 2019-09-02 NOTE — ED PROVIDER NOTE - PSH
AICD (Automatic Cardioverter/Defibrillator) Present  inserted in Aug, 2008. Due for battery change in 1 month. ( [employer]) . Inserted by Dr Duffy  S/P cholecystectomy

## 2019-09-02 NOTE — ED PROVIDER NOTE - CADM POA URETHRAL CATHETER
[FreeTextEntry3] : I, Sana Yepez, authored this note working as a medical scribe for Dr. Anton.  01/09/2019. 11:30AM. \par This note was authored by Sana Yepez working as medical scribe for me. I have reviewed, edited, and revised the note as needed. I am in agreement with the exam findings, imaging findings, and treatment plan.  Kulwinder Anton MD 
No

## 2019-09-02 NOTE — ED ADULT NURSE REASSESSMENT NOTE - NS ED NURSE REASSESS COMMENT FT1
Report received from SKIP GUERR[facility]. Pt. received A&Ox3, with PICC line in right arm, 2 drains on left side of abdomen, on 2L of O2 via nasal cannula with O2 sat of 100%. No acute distress at present. Pt. is resting comfortably. Will continue to monitor. Report received from SKIP GUERRERO. Pt. received A&Ox3, with PICC line in right arm positive blood return& flushes without diffculty, as per MD hammond to use PICC arm at this time, 2 drains on left side of abdomen, on 2L of O2 via nasal cannula with O2 sat of 100%. No acute distress at present. Pt. is resting comfortably. Will continue to monitor. Report received from SK[facility] ROBLES Pt. received A&Ox3, with PICC line in right arm positive blood return& flushes without difficulty, as per MD hammond to use PICC arm at this time, 2 drains on left side of abdomen, on 2L of O2 via nasal cannula with O2 sat of 100%. Received with stage 2 pressure ulcer on sacrum, 2 dime size stage 2 pressure ulcers on right buttocks and 1 quarter size stage 3 pressure ulcer on right buttock, 3 dime size stage 2 pressure ulcers on left buttock. Arrives with dressing in place. No acute distress at present. Pt. is resting comfortably. Will continue to monitor.

## 2019-09-02 NOTE — ED ADULT TRIAGE NOTE - CHIEF COMPLAINT QUOTE
pt sent from [facility]/ pt was having some resp difficulties/ staff stated to ems  pulse ox dropped to 70 s/ pt lethargic easily aroused  sat drops to 80 at times pt tachycardic rate 134. hx of abd surg with  aicd placed 1 yr ago no labored breathing  no cyanosis / family at bedside pt sent from [facility]/ pt was having some resp difficulties/ staff stated to ems  pulse ox dropped to 70 s/ pt lethargic easily aroused  sat drops to 80 at times pt tachycardic rate 134. hx of abd surg with  aicd placed 1 yr ago no labored breathing  no cyanosis / family at bedside  family states pt has many allegies to meds and is allergic to purple wipes used to clean equipament

## 2019-09-02 NOTE — ED PROVIDER NOTE - CLINICAL SUMMARY MEDICAL DECISION MAKING FREE TEXT BOX
Pt presents from [facility] for increased work of breathing, here appears well, not complaining of chest pain or shortness of breath, not tachypneic, abd soft NT, no increase in ostomy output- overall appears to be at baseline per her daughter, pt is tachycardic but daughter says this is her baseline, will have EP eval pacemaker as well as get cardiac labs, basic labs, consider CT abd/pelvis, continue to assess-will require admission-pt has been normotensive and saturating normally since being here, does not appear infectious/septic at this time   Radha Doyle, PGY-3 EM Pt presents from [facility] for increased work of breathing, decreased mental status here appears well, not complaining of chest pain or shortness of breath, not tachypneic, abd soft NT, no increase in ostomy output- overall appears to be at baseline per her daughter, pt is tachycardic but daughter says this is her baseline, will have EP eval pacemaker as well as get cardiac labs, basic labs, consider CT abd/pelvis, continue to assess-will require admission-pt has been normotensive and saturating normally since being here, does not appear infectious/septic at this time   Radha Doyle, PGY-3 EM

## 2019-09-02 NOTE — ED PROVIDER NOTE - CPE EDP ENMT NORM
Patient called.  She would like to get her protime drawn at [facility] today.  Orders placed.  AMB   normal...

## 2019-09-02 NOTE — ED ADULT NURSE NOTE - OBJECTIVE STATEMENT
Pt rec'd in [facility], A&Ox3, from [facility], s/p diverticular surgery in July, complicated by infection and abscess to left abdomen, where pt now has 2 drains. Pt was having SOB while doing PT in rehab and was found to have low O2 sat. Pt reports she complained of SOB prior to PT session as well. Pt denies chest pain. Noted to be tachycardic. Pt noted to have a colostomy to right abdomen and a single lumen PICC line to right upper arm, through which she is currently receiving antibiotics. Pt is currently non-ambulatory, but ambulated with a walker prior to her surgery. Pt rec'd in [facility], A&Ox3, from [facility], s/p diverticular surgery in July, complicated by infection and abscess to left abdomen, where pt now has 2 drains. Pt was having SOB while doing PT in rehab and was found to have low O2 sat. Pt reports she complained of SOB prior to PT session as well. Pt denies chest pain. Noted to be tachycardic. Pt noted to have a colostomy to right abdomen and a single lumen PICC line to right upper arm, through which she is currently receiving antibiotics. Pt is currently non-ambulatory, but ambulated with a walker prior to her surgery. Pt is in a paced rhythm on cardiac monitor.

## 2019-09-02 NOTE — ED PROVIDER NOTE - PMH
Asthma    Atrial flutter    Cardiac Pacemaker    Cardiomyopathy    Congestive Heart Failure    Diabetes    Diverticulitis    Gout    HTN - Hypertension    Kidney stone    Patient is Pentecostal    Refusal of blood transfusions as patient is Pentecostal    Vertigo

## 2019-09-02 NOTE — ED ADULT NURSE NOTE - CHIEF COMPLAINT QUOTE
pt sent from [facility]/ pt was having some resp difficulties/ staff stated to ems  pulse ox dropped to 70 s/ pt lethargic easily aroused  sat drops to 80 at times pt tachycardic rate 134. hx of abd surg with  aicd placed 1 yr ago no labored breathing  no cyanosis / family at bedside  family states pt has many allegies to meds and is allergic to purple wipes used to clean equipament

## 2019-09-02 NOTE — CHART NOTE - NSCHARTNOTEFT_GEN_A_CORE
ELECTROPHYSIOLOGY    Device Interrogation Performed                            Date/Time: 9/2/2019 2120  :   [email or web address]                                     Model:           Viva XT CRT-D [id number]                     Mode:                       DDD                                Rate:       Atrial Lead: Yes  P wave amplitude:                 3.4         mv            Impedance             589                          Ohms  Threshold                  0.625                        V @          0.4      ms      Ventricular Lead: RV  R wave amplitude                                         >20                      mv  Impedance        646                               Ohms  Threshold            0.75                             V @     0.4            ms      Ventricular Lead: LV  amplitude                                         mv  Impedance          532                             Ohms  Threshold       3.5                                  V @         0.50       ms                                  Battery Status:                     Good                OLIVER        2.9 years             EOL    Underlying Rhythm:   SInus tachycardia -120    Events/Observation: No therapies delivered since last interrogation      Impression/Plan:  Normal PPM / ICD function.   Normal sensing and pacing via iterative testing. Good battery status. Excellent threshold capture.  No reprogramming.

## 2019-09-02 NOTE — ED PROVIDER NOTE - ATTENDING CONTRIBUTION TO CARE
on ertapenem.   tachypnea and hypoxic episode in [place] with decreased MS. in ED close to baseline difficulty speaking 2/2 no denture. patient denies current. cp, fever, sob palpitations, abd pain. reports intermittent pain from decubitus. tachycardic. plan: labs cxr, ekg CT, card to interrogate AICD/pacer (paced in 130s). 73 yo Female with PMH of asthma, A fib, dm, HFrEF, pacemaker placed, with perforated diverticulitis s/p L hemicolectomy and end colostomy on ertapenem presents from [place] after episode of tachypnea, hypoxia with decreased MS. In ED close to baseline, per daughter, difficulty speaking 2/2 no denture. patient denies current cp, fever, sob palpitations, abd pain. reports intermittent pain from decubitus. tachycardic. plan: labs cxr, ekg CT, card to interrogate AICD/pacer (paced in 130s),admit. patient signed out to dr whalen pending CT

## 2019-09-02 NOTE — ED PROVIDER NOTE - OBJECTIVE STATEMENT
75 yo Female with PMH of asthma, A fib, dm, HFrEF, pacemaker placed, with perforated diverticulitis s/p L hemicolectomy and end colostomy presents from [place] 2/2 increased work of breathing. Daughter at bedside reports she is recently at [place] for 1 week duration for strengthening-has been in and out of hospitals due to the diverticulitis. Pt has been in good health per daughter but today was called because she had increased work of breathing. Daughter reports she was a little out of it but was always mentating, wasn't complaining of anything to her knowledge. Patient herself has no complaints at this time but also does not appear to have much insight to her medical conditions. Normally lives at home with her daughter. Daughter reports she is always tachycardic but does not know the reason why,

## 2019-09-03 DIAGNOSIS — E11.9 TYPE 2 DIABETES MELLITUS WITHOUT COMPLICATIONS: ICD-10-CM

## 2019-09-03 DIAGNOSIS — J45.909 UNSPECIFIED ASTHMA, UNCOMPLICATED: ICD-10-CM

## 2019-09-03 DIAGNOSIS — R06.02 SHORTNESS OF BREATH: ICD-10-CM

## 2019-09-03 DIAGNOSIS — J18.9 PNEUMONIA, UNSPECIFIED ORGANISM: ICD-10-CM

## 2019-09-03 DIAGNOSIS — I50.22 CHRONIC SYSTOLIC (CONGESTIVE) HEART FAILURE: ICD-10-CM

## 2019-09-03 DIAGNOSIS — I48.92 UNSPECIFIED ATRIAL FLUTTER: ICD-10-CM

## 2019-09-03 DIAGNOSIS — K57.92 DIVERTICULITIS OF INTESTINE, PART UNSPECIFIED, WITHOUT PERFORATION OR ABSCESS WITHOUT BLEEDING: ICD-10-CM

## 2019-09-03 DIAGNOSIS — Z95.0 PRESENCE OF CARDIAC PACEMAKER: ICD-10-CM

## 2019-09-03 DIAGNOSIS — Z29.9 ENCOUNTER FOR PROPHYLACTIC MEASURES, UNSPECIFIED: ICD-10-CM

## 2019-09-03 LAB
ALBUMIN SERPL ELPH-MCNC: 2.6 G/DL — LOW (ref 3.3–5)
ALP SERPL-CCNC: 115 U/L — SIGNIFICANT CHANGE UP (ref 40–120)
ALT FLD-CCNC: 4 U/L — SIGNIFICANT CHANGE UP (ref 4–33)
ANION GAP SERPL CALC-SCNC: 13 MMO/L — SIGNIFICANT CHANGE UP (ref 7–14)
AST SERPL-CCNC: 8 U/L — SIGNIFICANT CHANGE UP (ref 4–32)
BILIRUB SERPL-MCNC: 0.3 MG/DL — SIGNIFICANT CHANGE UP (ref 0.2–1.2)
BUN SERPL-MCNC: 16 MG/DL — SIGNIFICANT CHANGE UP (ref 7–23)
CALCIUM SERPL-MCNC: 8.2 MG/DL — LOW (ref 8.4–10.5)
CHLORIDE SERPL-SCNC: 99 MMOL/L — SIGNIFICANT CHANGE UP (ref 98–107)
CO2 SERPL-SCNC: 24 MMOL/L — SIGNIFICANT CHANGE UP (ref 22–31)
CREAT SERPL-MCNC: 0.67 MG/DL — SIGNIFICANT CHANGE UP (ref 0.5–1.3)
GLUCOSE BLDC GLUCOMTR-MCNC: 167 MG/DL — HIGH (ref 70–99)
GLUCOSE BLDC GLUCOMTR-MCNC: 171 MG/DL — HIGH (ref 70–99)
GLUCOSE BLDC GLUCOMTR-MCNC: 183 MG/DL — HIGH (ref 70–99)
GLUCOSE SERPL-MCNC: 269 MG/DL — HIGH (ref 70–99)
HCT VFR BLD CALC: 33.7 % — LOW (ref 34.5–45)
HGB BLD-MCNC: 9.9 G/DL — LOW (ref 11.5–15.5)
MAGNESIUM SERPL-MCNC: 1.6 MG/DL — SIGNIFICANT CHANGE UP (ref 1.6–2.6)
MCHC RBC-ENTMCNC: 28.6 PG — SIGNIFICANT CHANGE UP (ref 27–34)
MCHC RBC-ENTMCNC: 29.4 % — LOW (ref 32–36)
MCV RBC AUTO: 97.4 FL — SIGNIFICANT CHANGE UP (ref 80–100)
NRBC # FLD: 0 K/UL — SIGNIFICANT CHANGE UP (ref 0–0)
PHOSPHATE SERPL-MCNC: 3.9 MG/DL — SIGNIFICANT CHANGE UP (ref 2.5–4.5)
PLATELET # BLD AUTO: 212 K/UL — SIGNIFICANT CHANGE UP (ref 150–400)
PMV BLD: 9.5 FL — SIGNIFICANT CHANGE UP (ref 7–13)
POTASSIUM SERPL-MCNC: 4 MMOL/L — SIGNIFICANT CHANGE UP (ref 3.5–5.3)
POTASSIUM SERPL-SCNC: 4 MMOL/L — SIGNIFICANT CHANGE UP (ref 3.5–5.3)
PROT SERPL-MCNC: 6.6 G/DL — SIGNIFICANT CHANGE UP (ref 6–8.3)
RBC # BLD: 3.46 M/UL — LOW (ref 3.8–5.2)
RBC # FLD: 20.9 % — HIGH (ref 10.3–14.5)
SODIUM SERPL-SCNC: 136 MMOL/L — SIGNIFICANT CHANGE UP (ref 135–145)
TROPONIN T, HIGH SENSITIVITY: 38 NG/L — SIGNIFICANT CHANGE UP (ref ?–14)
WBC # BLD: 6.69 K/UL — SIGNIFICANT CHANGE UP (ref 3.8–10.5)
WBC # FLD AUTO: 6.69 K/UL — SIGNIFICANT CHANGE UP (ref 3.8–10.5)

## 2019-09-03 PROCEDURE — 74177 CT ABD & PELVIS W/CONTRAST: CPT | Mod: 26

## 2019-09-03 PROCEDURE — 71275 CT ANGIOGRAPHY CHEST: CPT | Mod: 26

## 2019-09-03 PROCEDURE — 99223 1ST HOSP IP/OBS HIGH 75: CPT | Mod: GC

## 2019-09-03 PROCEDURE — 12345: CPT | Mod: NC

## 2019-09-03 RX ORDER — DEXTROSE 50 % IN WATER 50 %
25 SYRINGE (ML) INTRAVENOUS ONCE
Refills: 0 | Status: DISCONTINUED | OUTPATIENT
Start: 2019-09-03 | End: 2019-09-06

## 2019-09-03 RX ORDER — FAMOTIDINE 10 MG/ML
20 INJECTION INTRAVENOUS DAILY
Refills: 0 | Status: DISCONTINUED | OUTPATIENT
Start: 2019-09-03 | End: 2019-09-09

## 2019-09-03 RX ORDER — ERTAPENEM SODIUM 1 G/1
1000 INJECTION, POWDER, LYOPHILIZED, FOR SOLUTION INTRAMUSCULAR; INTRAVENOUS ONCE
Refills: 0 | Status: COMPLETED | OUTPATIENT
Start: 2019-09-03 | End: 2019-09-03

## 2019-09-03 RX ORDER — FUROSEMIDE 40 MG
40 TABLET ORAL
Refills: 0 | Status: DISCONTINUED | OUTPATIENT
Start: 2019-09-03 | End: 2019-09-05

## 2019-09-03 RX ORDER — ALBUTEROL 90 UG/1
2 AEROSOL, METERED ORAL EVERY 6 HOURS
Refills: 0 | Status: DISCONTINUED | OUTPATIENT
Start: 2019-09-03 | End: 2019-09-03

## 2019-09-03 RX ORDER — APIXABAN 2.5 MG/1
5 TABLET, FILM COATED ORAL EVERY 12 HOURS
Refills: 0 | Status: DISCONTINUED | OUTPATIENT
Start: 2019-09-03 | End: 2019-09-09

## 2019-09-03 RX ORDER — AZITHROMYCIN 500 MG/1
500 TABLET, FILM COATED ORAL EVERY 24 HOURS
Refills: 0 | Status: DISCONTINUED | OUTPATIENT
Start: 2019-09-03 | End: 2019-09-03

## 2019-09-03 RX ORDER — DEXTROSE 50 % IN WATER 50 %
12.5 SYRINGE (ML) INTRAVENOUS ONCE
Refills: 0 | Status: DISCONTINUED | OUTPATIENT
Start: 2019-09-03 | End: 2019-09-06

## 2019-09-03 RX ORDER — LORATADINE 10 MG/1
10 TABLET ORAL DAILY
Refills: 0 | Status: DISCONTINUED | OUTPATIENT
Start: 2019-09-03 | End: 2019-09-09

## 2019-09-03 RX ORDER — GLUCAGON INJECTION, SOLUTION 0.5 MG/.1ML
1 INJECTION, SOLUTION SUBCUTANEOUS ONCE
Refills: 0 | Status: DISCONTINUED | OUTPATIENT
Start: 2019-09-03 | End: 2019-09-09

## 2019-09-03 RX ORDER — ASCORBIC ACID 60 MG
500 TABLET,CHEWABLE ORAL DAILY
Refills: 0 | Status: DISCONTINUED | OUTPATIENT
Start: 2019-09-03 | End: 2019-09-09

## 2019-09-03 RX ORDER — MAGNESIUM SULFATE 500 MG/ML
2 VIAL (ML) INJECTION ONCE
Refills: 0 | Status: COMPLETED | OUTPATIENT
Start: 2019-09-03 | End: 2019-09-03

## 2019-09-03 RX ORDER — CHLORHEXIDINE GLUCONATE 213 G/1000ML
1 SOLUTION TOPICAL AT BEDTIME
Refills: 0 | Status: DISCONTINUED | OUTPATIENT
Start: 2019-09-03 | End: 2019-09-09

## 2019-09-03 RX ORDER — ERTAPENEM SODIUM 1 G/1
INJECTION, POWDER, LYOPHILIZED, FOR SOLUTION INTRAMUSCULAR; INTRAVENOUS
Refills: 0 | Status: DISCONTINUED | OUTPATIENT
Start: 2019-09-03 | End: 2019-09-09

## 2019-09-03 RX ORDER — MONTELUKAST 4 MG/1
10 TABLET, CHEWABLE ORAL DAILY
Refills: 0 | Status: DISCONTINUED | OUTPATIENT
Start: 2019-09-03 | End: 2019-09-09

## 2019-09-03 RX ORDER — CHOLECALCIFEROL (VITAMIN D3) 125 MCG
0 CAPSULE ORAL
Qty: 0 | Refills: 0 | DISCHARGE

## 2019-09-03 RX ORDER — MAGNESIUM OXIDE 400 MG ORAL TABLET 241.3 MG
400 TABLET ORAL
Refills: 0 | Status: DISCONTINUED | OUTPATIENT
Start: 2019-09-03 | End: 2019-09-09

## 2019-09-03 RX ORDER — TIOTROPIUM BROMIDE 18 UG/1
1 CAPSULE ORAL; RESPIRATORY (INHALATION) DAILY
Refills: 0 | Status: DISCONTINUED | OUTPATIENT
Start: 2019-09-03 | End: 2019-09-09

## 2019-09-03 RX ORDER — DEXTROSE 50 % IN WATER 50 %
15 SYRINGE (ML) INTRAVENOUS ONCE
Refills: 0 | Status: DISCONTINUED | OUTPATIENT
Start: 2019-09-03 | End: 2019-09-06

## 2019-09-03 RX ORDER — ALBUTEROL 90 UG/1
2 AEROSOL, METERED ORAL EVERY 6 HOURS
Refills: 0 | Status: DISCONTINUED | OUTPATIENT
Start: 2019-09-03 | End: 2019-09-04

## 2019-09-03 RX ORDER — SODIUM CHLORIDE 9 MG/ML
1000 INJECTION, SOLUTION INTRAVENOUS
Refills: 0 | Status: DISCONTINUED | OUTPATIENT
Start: 2019-09-03 | End: 2019-09-05

## 2019-09-03 RX ORDER — ERTAPENEM SODIUM 1 G/1
1000 INJECTION, POWDER, LYOPHILIZED, FOR SOLUTION INTRAMUSCULAR; INTRAVENOUS EVERY 24 HOURS
Refills: 0 | Status: DISCONTINUED | OUTPATIENT
Start: 2019-09-04 | End: 2019-09-09

## 2019-09-03 RX ORDER — INSULIN GLARGINE 100 [IU]/ML
8 INJECTION, SOLUTION SUBCUTANEOUS AT BEDTIME
Refills: 0 | Status: DISCONTINUED | OUTPATIENT
Start: 2019-09-03 | End: 2019-09-03

## 2019-09-03 RX ORDER — INSULIN LISPRO 100/ML
VIAL (ML) SUBCUTANEOUS
Refills: 0 | Status: DISCONTINUED | OUTPATIENT
Start: 2019-09-03 | End: 2019-09-06

## 2019-09-03 RX ORDER — ATORVASTATIN CALCIUM 80 MG/1
20 TABLET, FILM COATED ORAL AT BEDTIME
Refills: 0 | Status: DISCONTINUED | OUTPATIENT
Start: 2019-09-03 | End: 2019-09-09

## 2019-09-03 RX ORDER — POTASSIUM CHLORIDE 20 MEQ
6.67 PACKET (EA) ORAL DAILY
Refills: 0 | Status: DISCONTINUED | OUTPATIENT
Start: 2019-09-03 | End: 2019-09-03

## 2019-09-03 RX ORDER — CETIRIZINE HYDROCHLORIDE 10 MG/1
10 TABLET ORAL DAILY
Refills: 0 | Status: DISCONTINUED | OUTPATIENT
Start: 2019-09-03 | End: 2019-09-03

## 2019-09-03 RX ORDER — CEFTRIAXONE 500 MG/1
1000 INJECTION, POWDER, FOR SOLUTION INTRAMUSCULAR; INTRAVENOUS EVERY 24 HOURS
Refills: 0 | Status: DISCONTINUED | OUTPATIENT
Start: 2019-09-03 | End: 2019-09-03

## 2019-09-03 RX ADMIN — MAGNESIUM OXIDE 400 MG ORAL TABLET 400 MILLIGRAM(S): 241.3 TABLET ORAL at 10:40

## 2019-09-03 RX ADMIN — MONTELUKAST 10 MILLIGRAM(S): 4 TABLET, CHEWABLE ORAL at 13:49

## 2019-09-03 RX ADMIN — Medication 3: at 10:15

## 2019-09-03 RX ADMIN — ERTAPENEM SODIUM 120 MILLIGRAM(S): 1 INJECTION, POWDER, LYOPHILIZED, FOR SOLUTION INTRAMUSCULAR; INTRAVENOUS at 22:07

## 2019-09-03 RX ADMIN — Medication 50 MILLIGRAM(S): at 00:35

## 2019-09-03 RX ADMIN — APIXABAN 5 MILLIGRAM(S): 2.5 TABLET, FILM COATED ORAL at 07:56

## 2019-09-03 RX ADMIN — SODIUM CHLORIDE 500 MILLILITER(S): 9 INJECTION INTRAMUSCULAR; INTRAVENOUS; SUBCUTANEOUS at 00:36

## 2019-09-03 RX ADMIN — MAGNESIUM OXIDE 400 MG ORAL TABLET 400 MILLIGRAM(S): 241.3 TABLET ORAL at 13:49

## 2019-09-03 RX ADMIN — Medication 40 MILLIGRAM(S): at 19:01

## 2019-09-03 RX ADMIN — SODIUM CHLORIDE 500 MILLILITER(S): 9 INJECTION INTRAMUSCULAR; INTRAVENOUS; SUBCUTANEOUS at 02:00

## 2019-09-03 RX ADMIN — Medication 650 MILLIGRAM(S): at 00:35

## 2019-09-03 RX ADMIN — Medication 1: at 19:01

## 2019-09-03 RX ADMIN — FAMOTIDINE 20 MILLIGRAM(S): 10 INJECTION INTRAVENOUS at 13:48

## 2019-09-03 RX ADMIN — APIXABAN 5 MILLIGRAM(S): 2.5 TABLET, FILM COATED ORAL at 22:07

## 2019-09-03 RX ADMIN — AZITHROMYCIN 250 MILLIGRAM(S): 500 TABLET, FILM COATED ORAL at 07:56

## 2019-09-03 RX ADMIN — Medication 1: at 13:52

## 2019-09-03 RX ADMIN — TIOTROPIUM BROMIDE 1 CAPSULE(S): 18 CAPSULE ORAL; RESPIRATORY (INHALATION) at 10:40

## 2019-09-03 RX ADMIN — CHLORHEXIDINE GLUCONATE 1 APPLICATION(S): 213 SOLUTION TOPICAL at 22:07

## 2019-09-03 RX ADMIN — ATORVASTATIN CALCIUM 20 MILLIGRAM(S): 80 TABLET, FILM COATED ORAL at 22:07

## 2019-09-03 RX ADMIN — Medication 50 GRAM(S): at 16:23

## 2019-09-03 RX ADMIN — Medication 650 MILLIGRAM(S): at 09:37

## 2019-09-03 RX ADMIN — MAGNESIUM OXIDE 400 MG ORAL TABLET 400 MILLIGRAM(S): 241.3 TABLET ORAL at 19:01

## 2019-09-03 RX ADMIN — Medication 500 MILLIGRAM(S): at 13:48

## 2019-09-03 RX ADMIN — LORATADINE 10 MILLIGRAM(S): 10 TABLET ORAL at 13:49

## 2019-09-03 NOTE — PROGRESS NOTE ADULT - PROBLEM SELECTOR PLAN 2
Hx of atrial flutter, previously on Eliquis however not on medication list according to daughter, CHADSvasc of 4. Trop 47->56, low concern for ischemia given lack of EKG changes and no chest pain. Cardiology following. Pro-.  - restart on Eliquis 5mg PO BID (pt had reported taking this yday) - s/p ostomy bag and abdominal wound.   - Wound care c/s.   - Complicated surgical history at [facility], per notes reviewed: "history of ex lap, Lupe's procedure for perforated diverticulitis...multiple abdominal washouts and abdomen closed with strattice"  - Had CT A/P on 8/30 showing "Significant interval decrease in size of left lower anterior pelvic subcutaneous collection"  - Patient was discharged to rehab on Ertapenem x4 weeks per ID, will continue Ertapenem here.   - Patient was stable for discharge from ID and surgical perspective with outpatient follow-up during previous admission.

## 2019-09-03 NOTE — H&P ADULT - PROBLEM SELECTOR PLAN 2
Hx of atrial flutter, previously on Eliquis however not on medication list according to daughter, CHADSvasc of 4. Trop 56--47, low concern for ischemia given lack of EKG changes. Pro-. Hx of atrial flutter, previously on Eliquis however not on medication list according to daughter, CHADSvasc of 4. Trop 56--47, low concern for ischemia given lack of EKG changes. Pro-.  - restart on Eliquis 5mg PO BID Hx of atrial flutter, previously on Eliquis however not on medication list according to daughter, CHADSvasc of 4. Trop 47->56, low concern for ischemia given lack of EKG changes and no chest pain. Cardiology following. Pro-.  - restart on Eliquis 5mg PO BID (pt had reported taking this yday)

## 2019-09-03 NOTE — H&P ADULT - PROBLEM SELECTOR PLAN 7
Diet: DASH/TLC  DVT:    Rodger Nguyen MD PGY-2  Department of Internal Medicine  Pager: [phone] (NS) /[phone] (FREDERIC) Diet: DASH/TLC  DVT: Alonzo Nguyen MD PGY-2  Department of Internal Medicine  Pager: [phone] (NS) /[phone] (FREDERIC)

## 2019-09-03 NOTE — PROGRESS NOTE ADULT - ASSESSMENT
74F with PMH of asthma, A fib, dm, HFrEF, pacemaker/ AICD, perforated diverticulitis s/p L hemicolectomy and end colostomy presents from [place] 2/2 tachypnea/lethargy found to be septic concern for PNA 74F with PMH of asthma, A fib, dm, HFrEF, pacemaker/ AICD, perforated diverticulitis s/p L hemicolectomy and end colostomy presents from [place] 2/2 tachypnea/lethargy.

## 2019-09-03 NOTE — H&P ADULT - HISTORY OF PRESENT ILLNESS
73 yo Female with PMH of asthma, A fib, dm, HFrEF, pacemake/ AICD, perforated diverticulitis s/p L hemicolectomy and end colostomy presents from [place] 2/2 increased work of breathing after a session of PT. Daughter at bedside reports she has been at [place] for 1 week for strengthening 2/2 being in and out of hospitals due to the diverticulitis. Daughter reports she was a little out of it but was always mentating, wasn't complaining of anything to her knowledge. Patient has no complaints at this time. Normally lives at home with her daughter. Daughter reports she is always tachycardic but does not know why. Denies CP during interview.    VS:  Afebile, , /51, RR 16, SpO2 100% on 2 L  Given tylenol, benadryl 50mg, methylprednisolone NS x2 L

## 2019-09-03 NOTE — ED ADULT NURSE REASSESSMENT NOTE - NS ED NURSE REASSESS COMMENT FT1
as per Tele Tech, pt had 5 beats Vtach, Tele PA Jon made aware, VSS, pt A&Ox3, as per ADS attending Rocephin to be discontinued, will continue to monitor.

## 2019-09-03 NOTE — H&P ADULT - NSHPPHYSICALEXAM_GEN_ALL_CORE
Vital Signs Last 24 Hrs  T(C): 36.6 (03 Sep 2019 03:24), Max: 36.9 (02 Sep 2019 20:20)  T(F): 97.8 (03 Sep 2019 03:24), Max: 98.4 (02 Sep 2019 20:20)  HR: 93 (03 Sep 2019 03:24) (93 - 134)  BP: 116/51 (03 Sep 2019 03:24) (100/61 - 143/74)  RR: 17 (03 Sep 2019 03:24) (16 - 18)  SpO2: 100% (03 Sep 2019 03:24) (99% - 100%)    GENERAL: NAD, well-developed  HEAD:  Atraumatic, Normocephalic  EYES: EOMI, PERRLA, conjunctiva and sclera clear  NECK: Supple, No JVD  CHEST/LUNG: Clear to auscultation bilaterally; No wheeze/rhonchi/rales   HEART: Regular rate and rhythm; normal S1 S2,  No murmurs, rubs, or gallops  ABDOMEN: ostomy bag in place  EXTREMITIES:  2+ Peripheral Pulses, No clubbing, cyanosis, or edema  PSYCH: AAOx3, No acute distress   NEUROLOGY: non-focal  SKIN: No rashes or lesions

## 2019-09-03 NOTE — PROGRESS NOTE ADULT - PROBLEM SELECTOR PLAN 3
- s/p ostomy bag, c/w wound care  - S/p complicated surgical history.   - Wound care recs EF = 45%   - Continue home medications of Lasix 40 mg PO daily and spironolactone 25 mg daily

## 2019-09-03 NOTE — PROGRESS NOTE ADULT - SUBJECTIVE AND OBJECTIVE BOX
Patient is a 74y old  Female who presents with a chief complaint of Lethargy and tachypnea (03 Sep 2019 04:59)      Subjective:    MEDICATIONS  (STANDING):  apixaban 5 milliGRAM(s) Oral every 12 hours  ascorbic acid 500 milliGRAM(s) Oral daily  atorvastatin 20 milliGRAM(s) Oral at bedtime  dextrose 5%. 1000 milliLiter(s) (50 mL/Hr) IV Continuous <Continuous>  dextrose 50% Injectable 12.5 Gram(s) IV Push once  dextrose 50% Injectable 25 Gram(s) IV Push once  dextrose 50% Injectable 25 Gram(s) IV Push once  famotidine    Tablet 20 milliGRAM(s) Oral daily  furosemide    Tablet 40 milliGRAM(s) Oral two times a day  insulin glargine Injectable (LANTUS) 8 Unit(s) SubCutaneous at bedtime  insulin lispro (HumaLOG) corrective regimen sliding scale   SubCutaneous three times a day before meals  loratadine 10 milliGRAM(s) Oral daily  magnesium oxide 400 milliGRAM(s) Oral three times a day with meals  magnesium sulfate  IVPB 2 Gram(s) IV Intermittent once  montelukast 10 milliGRAM(s) Oral daily  tiotropium 18 MICROgram(s) Capsule 1 Capsule(s) Inhalation daily    MEDICATIONS  (PRN):  ALBUTerol    90 MICROgram(s) HFA Inhaler 2 Puff(s) Inhalation every 6 hours PRN Shortness of Breath and/or Wheezing  dextrose 40% Gel 15 Gram(s) Oral once PRN Blood Glucose LESS THAN 70 milliGRAM(s)/deciliter  glucagon  Injectable 1 milliGRAM(s) IntraMuscular once PRN Glucose LESS THAN 70 milligrams/deciliter  guaiFENesin    Syrup 100 milliGRAM(s) Oral every 6 hours PRN Cough        Objective:    Vitals: Vital Signs Last 24 Hrs  T(C): 36.6 (09-03-19 @ 06:48), Max: 36.9 (09-02-19 @ 20:20)  T(F): 97.9 (09-03-19 @ 06:48), Max: 98.4 (09-02-19 @ 20:20)  HR: 78 (09-03-19 @ 09:00) (78 - 134)  BP: 105/58 (09-03-19 @ 09:00) (100/61 - 143/74)  BP(mean): --  RR: 17 (09-03-19 @ 09:00) (16 - 18)  SpO2: 98% (09-03-19 @ 09:00) (98% - 100%)            I&O's Summary      PHYSICAL EXAM:  GENERAL: NAD, well-groomed, well-developed  HEAD:  Atraumatic, Normocephalic  EYES: EOMI, PERRLA, conjunctiva and sclera clear  ENMT: No tonsillar erythema, exudates, or enlargement; Moist mucous membranes, Good dentition, No lesions  NECK: Supple, No JVD, Normal thyroid  CHEST/LUNG: Clear to percussion bilaterally; No rales, rhonchi, wheezing, or rubs  HEART: Regular rate and rhythm; No murmurs, rubs, or gallops  ABDOMEN: Soft, Nontender, Nondistended; Bowel sounds present  EXTREMITIES:  2+ Peripheral Pulses, No clubbing, cyanosis, or edema  LYMPH: No lymphadenopathy noted  SKIN: No rashes or lesions  NERVOUS SYSTEM:  Alert & Oriented X3, Good concentration; Motor Strength 5/5 B/L upper and lower extremities; DTRs 2+ intact and symmetric                                             LABS:  09-03    136  |  99  |  16  ----------------------------<  269<H>  4.0   |  24  |  0.67  09-02    140  |  99  |  14  ----------------------------<  135<H>  3.4<L>   |  24  |  0.67    Ca    8.2<L>      03 Sep 2019 10:30  Ca    9.0      02 Sep 2019 19:40  Phos  3.9     09-03  Mg     1.6     09-03    TPro  6.6  /  Alb  2.6<L>  /  TBili  0.3  /  DBili  x   /  AST  8   /  ALT  4   /  AlkPhos  115  09-03  TPro  7.3  /  Alb  2.8<L>  /  TBili  0.3  /  DBili  x   /  AST  12  /  ALT  8   /  AlkPhos  130<H>  09-02      PT/INR - ( 02 Sep 2019 19:40 )   PT: 19.8 SEC;   INR: 1.71          PTT - ( 02 Sep 2019 19:40 )  PTT:31.3 SEC                                        9.9    6.69  )-----------( 212      ( 03 Sep 2019 10:30 )             33.7                         8.8    16.00 )-----------( 281      ( 02 Sep 2019 19:40 )             28.0     CAPILLARY BLOOD GLUCOSE      POCT Blood Glucose.: 253 mg/dL (03 Sep 2019 09:49)  POCT Blood Glucose.: 230 mg/dL (03 Sep 2019 06:07)      RADIOLOGY & ADDITIONAL TESTS:    Imaging Personally Reviewed:  [ ] YES  [ ] NO      Consultants involved in case:   Consultant(s) Notes Reviewed:  [ ] YES  [ ] NO:   Care Discussed with Consultants/Other Providers [ ] YES  [ ] NO Patient is a 74y old  Female who presents with a chief complaint of Lethargy and tachypnea (03 Sep 2019 04:59)      Subjective: patient states she had an episode of trouble breathing while getting dressed, she currently denies chest pain, SOB or cough. She denies fevers, chills. She states she has abdominal pain when you press on it.     MEDICATIONS  (STANDING):  apixaban 5 milliGRAM(s) Oral every 12 hours  ascorbic acid 500 milliGRAM(s) Oral daily  atorvastatin 20 milliGRAM(s) Oral at bedtime  dextrose 5%. 1000 milliLiter(s) (50 mL/Hr) IV Continuous <Continuous>  dextrose 50% Injectable 12.5 Gram(s) IV Push once  dextrose 50% Injectable 25 Gram(s) IV Push once  dextrose 50% Injectable 25 Gram(s) IV Push once  famotidine    Tablet 20 milliGRAM(s) Oral daily  furosemide    Tablet 40 milliGRAM(s) Oral two times a day  insulin glargine Injectable (LANTUS) 8 Unit(s) SubCutaneous at bedtime  insulin lispro (HumaLOG) corrective regimen sliding scale   SubCutaneous three times a day before meals  loratadine 10 milliGRAM(s) Oral daily  magnesium oxide 400 milliGRAM(s) Oral three times a day with meals  magnesium sulfate  IVPB 2 Gram(s) IV Intermittent once  montelukast 10 milliGRAM(s) Oral daily  tiotropium 18 MICROgram(s) Capsule 1 Capsule(s) Inhalation daily    MEDICATIONS  (PRN):  ALBUTerol    90 MICROgram(s) HFA Inhaler 2 Puff(s) Inhalation every 6 hours PRN Shortness of Breath and/or Wheezing  dextrose 40% Gel 15 Gram(s) Oral once PRN Blood Glucose LESS THAN 70 milliGRAM(s)/deciliter  glucagon  Injectable 1 milliGRAM(s) IntraMuscular once PRN Glucose LESS THAN 70 milligrams/deciliter  guaiFENesin    Syrup 100 milliGRAM(s) Oral every 6 hours PRN Cough        Objective:    Vitals: Vital Signs Last 24 Hrs  T(C): 36.6 (09-03-19 @ 06:48), Max: 36.9 (09-02-19 @ 20:20)  T(F): 97.9 (09-03-19 @ 06:48), Max: 98.4 (09-02-19 @ 20:20)  HR: 78 (09-03-19 @ 09:00) (78 - 134)  BP: 105/58 (09-03-19 @ 09:00) (100/61 - 143/74)  BP(mean): --  RR: 17 (09-03-19 @ 09:00) (16 - 18)  SpO2: 98% (09-03-19 @ 09:00) (98% - 100%)            I&O's Summary      PHYSICAL EXAM:  GENERAL: NAD, well-groomed, well-developed  ENMT: Moist mucous membranes, Good dentition, No lesions  NECK: Supple, No JVD, Normal thyroid  CHEST/LUNG: Clear to percussion bilaterally; No rales, rhonchi, wheezing, or rubs  HEART: Regular rate and rhythm; No murmurs, rubs, or gallops  ABDOMEN: Soft, mild tenderness to palpation. Large abd dressing in place.   EXTREMITIES:  2+ Peripheral Pulses, No clubbing, cyanosis, or edema  NERVOUS SYSTEM:  Alert & Oriented X3, follows commands, answers simple questions                                         LABS:  09-03    136  |  99  |  16  ----------------------------<  269<H>  4.0   |  24  |  0.67  09-02    140  |  99  |  14  ----------------------------<  135<H>  3.4<L>   |  24  |  0.67    Ca    8.2<L>      03 Sep 2019 10:30  Ca    9.0      02 Sep 2019 19:40  Phos  3.9     09-03  Mg     1.6     09-03    TPro  6.6  /  Alb  2.6<L>  /  TBili  0.3  /  DBili  x   /  AST  8   /  ALT  4   /  AlkPhos  115  09-03  TPro  7.3  /  Alb  2.8<L>  /  TBili  0.3  /  DBili  x   /  AST  12  /  ALT  8   /  AlkPhos  130<H>  09-02      PT/INR - ( 02 Sep 2019 19:40 )   PT: 19.8 SEC;   INR: 1.71          PTT - ( 02 Sep 2019 19:40 )  PTT:31.3 SEC                                        9.9    6.69  )-----------( 212      ( 03 Sep 2019 10:30 )             33.7                         8.8    16.00 )-----------( 281      ( 02 Sep 2019 19:40 )             28.0     CAPILLARY BLOOD GLUCOSE      POCT Blood Glucose.: 253 mg/dL (03 Sep 2019 09:49)  POCT Blood Glucose.: 230 mg/dL (03 Sep 2019 06:07)

## 2019-09-03 NOTE — PROGRESS NOTE ADULT - PROBLEM SELECTOR PLAN 6
- c/w ventolin inhaler, spiriva, and singulair - SSI and monitor FS qAC and qHS   - Adjust regimen as necessary

## 2019-09-03 NOTE — H&P ADULT - ASSESSMENT
73 yo Female with PMH of asthma, A fib, dm, HFrEF, pacemaker/ AICD, perforated diverticulitis s/p L hemicolectomy and end colostomy presents from [place] 2/2 tachypnea/lethargy found to be septic concern for PNA

## 2019-09-03 NOTE — H&P ADULT - PROBLEM SELECTOR PLAN 1
Most likely source is PNA given tachypnea and O2 requirement. Hx of asthma however not wheezing on exam.  Right middle lobe partial atelectasis. Left lower lobe complete collapse again seen; correlate for superimposed, pneumonia. Areas of peripheral mucoid impaction in the left upper lobe  - BCx2  - Ceftriaxone + azithro for CAP Most likely source is PNA given tachypnea and O2 requirement. Hx of asthma however not wheezing on exam.  Right middle lobe partial atelectasis. Left lower lobe complete collapse again seen; correlate for superimposed, pneumonia. Areas of peripheral mucoid impaction in the left upper lobe  - BCx2, urine legionella  - Ceftriaxone 1g qD + azithro 500mg IVP qD for CAP Most likely source is PNA given tachypnea and O2 requirement. Hx of asthma however not wheezing on exam.  Right middle lobe partial atelectasis. Left lower lobe complete collapse again seen; correlate for superimposed, pneumonia. Areas of peripheral mucoid impaction in the left upper lobe  - BCx2, urine legionella  - Ceftriaxone 1g qD + azithro 500mg IVP qD for CAP  - SOB already possibly related to fatigue/deconditioning, improved with solumedrol. WIll cont to monitor off steroids as no wheezing or rales.

## 2019-09-03 NOTE — ED ADULT NURSE REASSESSMENT NOTE - NS ED NURSE REASSESS COMMENT FT1
No acute distress at present. Pt. changed, turned, and repositioned. Allevyne dressing replaced on pressure ulcers. Awaiting bed assignment. Will continue to monitor.

## 2019-09-03 NOTE — PROGRESS NOTE ADULT - PROBLEM SELECTOR PLAN 1
Most likely source is PNA given tachypnea and O2 requirement. Hx of asthma however not wheezing on exam.  Right middle lobe partial atelectasis. Left lower lobe complete collapse again seen; correlate for superimposed, pneumonia. Areas of peripheral mucoid impaction in the left upper lobe  - BCx2, urine legionella  - Ceftriaxone 1g qD + azithro 500mg IVP qD for CAP  - SOB already possibly related to fatigue/deconditioning, improved with solumedrol. WIll cont to monitor off steroids as no wheezing or rales. - Patient sent to ED for evaluation of increased work of breathing at [facility], etiology unclear   CT C/A/P with no significant change from 8/30  - "Left lower lobe complete collapse again seen" - this may be due to atelectasis or mucoid impaction, however patient clinically does not have PNA as she is afebrile and has no cough. Will d/c antibiotics. Incentive spirometry.   - WOB may be related to deconditioning in setting of recent hospitalization and complex surgical history   - There is no evidence of PE on CTA chest  - S/p solumedrol in ED - does not appear to be asthma exacerbation as patient without wheezing or increased RR on exam. Will hold off continuing steroids   - Monitor respiratory status. - Patient sent to ED for evaluation of increased work of breathing at [facility], etiology unclear although resolved at this time.   CT C/A/P with no significant change from 8/30  - "Left lower lobe complete collapse again seen" - this may be due to atelectasis or mucoid impaction, however patient clinically does not have PNA as she is afebrile and has no cough. Will d/c antibiotics. Incentive spirometry.   - WOB may be related to deconditioning in setting of recent hospitalization and complex surgical history   - There is no evidence of PE on CTA chest  - S/p solumedrol in ED - does not appear to be asthma exacerbation as patient without wheezing or increased RR on exam. Will hold off continuing steroids   - Monitor respiratory status.

## 2019-09-03 NOTE — PROGRESS NOTE ADULT - PROBLEM SELECTOR PLAN 8
Diet: DASH/TLC  DVT: Eliquis  Dispo: Diet: DASH/TLC  DVT: Eliquis  Dispo: ROBB assistance for placement to [facility].

## 2019-09-03 NOTE — PROGRESS NOTE ADULT - PROBLEM SELECTOR PLAN 5
- SSI and monitor FS qAC and qHS   - Adjust regimen as necessary Interrogated by cardiology, no events, normal functioning

## 2019-09-03 NOTE — PROGRESS NOTE ADULT - PROBLEM SELECTOR PLAN 4
Interrogated by cardiology, no events, normal functioning - C/w on Eliquis 5mg PO BID  - Maintain K+ > 4.0 and Mg > 2.0

## 2019-09-03 NOTE — H&P ADULT - ATTENDING COMMENTS
I have personally seen, examined, and participated in the care of this patient. I have reviewed all pertinent clinical information, including history, physical exam, plan, and the Resident's note and agree.  I have edited where appropriate.    Phi Maher MD

## 2019-09-03 NOTE — ED ADULT NURSE REASSESSMENT NOTE - NS ED NURSE REASSESS COMMENT FT1
Report received from break coverage SKIP EGAN Pt. returned from CT, VS as noted. MD Hernandez aware of pt. blood pressure. Pt. denies dizziness at present. Respirations are even & unlabored. Pt. offers no complaints at present. Awaiting CT results. Will continue to monitor.

## 2019-09-03 NOTE — ED ADULT NURSE REASSESSMENT NOTE - NS ED NURSE REASSESS COMMENT FT1
VS as noted. Admitting MD Maher aware of pt. vital signs. As per MD Maher, hold lasix at this time. Respirations are even and unlabored. Resting comfortably, offers no complaints. Will continue to monitor.

## 2019-09-03 NOTE — ED ADULT NURSE REASSESSMENT NOTE - NS ED NURSE REASSESS COMMENT FT1
received report from night RN, pt A&Ox3 but slow to answer assessment questions, VSS, cm shows paced rhythm, pt with colosotomy, and b/l nephrostomies, pt with rt PICC OK to access confirmed on xray prior to shift change, pt admitted-pending bed assignment

## 2019-09-03 NOTE — H&P ADULT - NSHPLABSRESULTS_GEN_ALL_CORE
8.8    16.00 )-----------( 281      ( 02 Sep 2019 19:40 )             28.0     09-02    140  |  99  |  14  ----------------------------<  135<H>  3.4<L>   |  24  |  0.67    Ca    9.0      02 Sep 2019 19:40    TPro  7.3  /  Alb  2.8<L>  /  TBili  0.3  /  DBili  x   /  AST  12  /  ALT  8   /  AlkPhos  130<H>  09-02    Trop T: 56-->47    Pro-    INR: 1.71    Chest x-ray: wnl    CT Angio Chest w/ IV Cont (09.03.19 @ 01:46)     LUNGS AND LARGE AIRWAYS: Patent central airways. Right middle lobe   partial atelectasis. Left lower lobe complete collapse again seen;   correlate for superimposed, pneumonia. Areas of peripheral mucoid   impaction in the left upper lobe. A left upper lobe nodule (3:36)   measures 0.7 cm, unchanged from September 2018.  PLEURA: Small left pleural effusion. Labs and imaging personally reviewed                          8.8    16.00 )-----------( 281      ( 02 Sep 2019 19:40 )             28.0     09-02    140  |  99  |  14  ----------------------------<  135<H>  3.4<L>   |  24  |  0.67    Ca    9.0      02 Sep 2019 19:40    TPro  7.3  /  Alb  2.8<L>  /  TBili  0.3  /  DBili  x   /  AST  12  /  ALT  8   /  AlkPhos  130<H>  09-02    Trop T: 56-->47    Pro-    INR: 1.71    Chest x-ray: wnl    CT Angio Chest w/ IV Cont (09.03.19 @ 01:46)     LUNGS AND LARGE AIRWAYS: Patent central airways. Right middle lobe   partial atelectasis. Left lower lobe complete collapse again seen;   correlate for superimposed, pneumonia. Areas of peripheral mucoid   impaction in the left upper lobe. A left upper lobe nodule (3:36)   measures 0.7 cm, unchanged from September 2018.  PLEURA: Small left pleural effusion.    EKG: no ST/T wave abnormalities

## 2019-09-03 NOTE — H&P ADULT - NSICDXPASTSURGICALHX_GEN_ALL_CORE_FT
PAST SURGICAL HISTORY:  AICD (Automatic Cardioverter/Defibrillator) Present inserted in Aug, 2008. Due for battery change in 1 month. ( [place]) . Inserted by Dr Duffy    S/P cholecystectomy

## 2019-09-04 DIAGNOSIS — D64.9 ANEMIA, UNSPECIFIED: ICD-10-CM

## 2019-09-04 LAB
ALBUMIN SERPL ELPH-MCNC: 2.6 G/DL — LOW (ref 3.3–5)
ALP SERPL-CCNC: 103 U/L — SIGNIFICANT CHANGE UP (ref 40–120)
ALT FLD-CCNC: 6 U/L — SIGNIFICANT CHANGE UP (ref 4–33)
ANION GAP SERPL CALC-SCNC: 12 MMO/L — SIGNIFICANT CHANGE UP (ref 7–14)
AST SERPL-CCNC: 10 U/L — SIGNIFICANT CHANGE UP (ref 4–32)
BASOPHILS # BLD AUTO: 0.01 K/UL — SIGNIFICANT CHANGE UP (ref 0–0.2)
BASOPHILS NFR BLD AUTO: 0.1 % — SIGNIFICANT CHANGE UP (ref 0–2)
BILIRUB SERPL-MCNC: 0.2 MG/DL — SIGNIFICANT CHANGE UP (ref 0.2–1.2)
BUN SERPL-MCNC: 16 MG/DL — SIGNIFICANT CHANGE UP (ref 7–23)
CALCIUM SERPL-MCNC: 8.4 MG/DL — SIGNIFICANT CHANGE UP (ref 8.4–10.5)
CHLORIDE SERPL-SCNC: 102 MMOL/L — SIGNIFICANT CHANGE UP (ref 98–107)
CO2 SERPL-SCNC: 27 MMOL/L — SIGNIFICANT CHANGE UP (ref 22–31)
CREAT SERPL-MCNC: 0.67 MG/DL — SIGNIFICANT CHANGE UP (ref 0.5–1.3)
CULTURE RESULTS: SIGNIFICANT CHANGE UP
EOSINOPHIL # BLD AUTO: 0.01 K/UL — SIGNIFICANT CHANGE UP (ref 0–0.5)
EOSINOPHIL NFR BLD AUTO: 0.1 % — SIGNIFICANT CHANGE UP (ref 0–6)
GLUCOSE BLDC GLUCOMTR-MCNC: 109 MG/DL — HIGH (ref 70–99)
GLUCOSE BLDC GLUCOMTR-MCNC: 117 MG/DL — HIGH (ref 70–99)
GLUCOSE BLDC GLUCOMTR-MCNC: 117 MG/DL — HIGH (ref 70–99)
GLUCOSE BLDC GLUCOMTR-MCNC: 138 MG/DL — HIGH (ref 70–99)
GLUCOSE SERPL-MCNC: 136 MG/DL — HIGH (ref 70–99)
HCT VFR BLD CALC: 24.2 % — LOW (ref 34.5–45)
HGB BLD-MCNC: 7.3 G/DL — LOW (ref 11.5–15.5)
IMM GRANULOCYTES NFR BLD AUTO: 3 % — HIGH (ref 0–1.5)
INR BLD: 1.61 — HIGH (ref 0.88–1.17)
LYMPHOCYTES # BLD AUTO: 1.85 K/UL — SIGNIFICANT CHANGE UP (ref 1–3.3)
LYMPHOCYTES # BLD AUTO: 22.6 % — SIGNIFICANT CHANGE UP (ref 13–44)
MAGNESIUM SERPL-MCNC: 2.2 MG/DL — SIGNIFICANT CHANGE UP (ref 1.6–2.6)
MCHC RBC-ENTMCNC: 28.7 PG — SIGNIFICANT CHANGE UP (ref 27–34)
MCHC RBC-ENTMCNC: 30.2 % — LOW (ref 32–36)
MCV RBC AUTO: 95.3 FL — SIGNIFICANT CHANGE UP (ref 80–100)
MONOCYTES # BLD AUTO: 0.61 K/UL — SIGNIFICANT CHANGE UP (ref 0–0.9)
MONOCYTES NFR BLD AUTO: 7.4 % — SIGNIFICANT CHANGE UP (ref 2–14)
NEUTROPHILS # BLD AUTO: 5.47 K/UL — SIGNIFICANT CHANGE UP (ref 1.8–7.4)
NEUTROPHILS NFR BLD AUTO: 66.8 % — SIGNIFICANT CHANGE UP (ref 43–77)
NRBC # FLD: 0 K/UL — SIGNIFICANT CHANGE UP (ref 0–0)
ORGANISM # SPEC MICROSCOPIC CNT: SIGNIFICANT CHANGE UP
PHOSPHATE SERPL-MCNC: 2.7 MG/DL — SIGNIFICANT CHANGE UP (ref 2.5–4.5)
PLATELET # BLD AUTO: 249 K/UL — SIGNIFICANT CHANGE UP (ref 150–400)
PMV BLD: 9 FL — SIGNIFICANT CHANGE UP (ref 7–13)
POTASSIUM SERPL-MCNC: 3.5 MMOL/L — SIGNIFICANT CHANGE UP (ref 3.5–5.3)
POTASSIUM SERPL-SCNC: 3.5 MMOL/L — SIGNIFICANT CHANGE UP (ref 3.5–5.3)
PROT SERPL-MCNC: 6.5 G/DL — SIGNIFICANT CHANGE UP (ref 6–8.3)
PROTHROM AB SERPL-ACNC: 18.6 SEC — HIGH (ref 9.8–13.1)
RBC # BLD: 2.54 M/UL — LOW (ref 3.8–5.2)
RBC # FLD: 20.9 % — HIGH (ref 10.3–14.5)
SODIUM SERPL-SCNC: 141 MMOL/L — SIGNIFICANT CHANGE UP (ref 135–145)
SPECIMEN SOURCE: SIGNIFICANT CHANGE UP
WBC # BLD: 8.2 K/UL — SIGNIFICANT CHANGE UP (ref 3.8–10.5)
WBC # FLD AUTO: 8.2 K/UL — SIGNIFICANT CHANGE UP (ref 3.8–10.5)

## 2019-09-04 PROCEDURE — 99233 SBSQ HOSP IP/OBS HIGH 50: CPT

## 2019-09-04 RX ORDER — IPRATROPIUM/ALBUTEROL SULFATE 18-103MCG
3 AEROSOL WITH ADAPTER (GRAM) INHALATION EVERY 6 HOURS
Refills: 0 | Status: DISCONTINUED | OUTPATIENT
Start: 2019-09-04 | End: 2019-09-05

## 2019-09-04 RX ORDER — POTASSIUM CHLORIDE 20 MEQ
20 PACKET (EA) ORAL ONCE
Refills: 0 | Status: COMPLETED | OUTPATIENT
Start: 2019-09-04 | End: 2019-09-04

## 2019-09-04 RX ORDER — INSULIN LISPRO 100/ML
VIAL (ML) SUBCUTANEOUS AT BEDTIME
Refills: 0 | Status: DISCONTINUED | OUTPATIENT
Start: 2019-09-04 | End: 2019-09-06

## 2019-09-04 RX ORDER — ACETAMINOPHEN 500 MG
650 TABLET ORAL ONCE
Refills: 0 | Status: COMPLETED | OUTPATIENT
Start: 2019-09-04 | End: 2019-09-04

## 2019-09-04 RX ORDER — FERROUS SULFATE 325(65) MG
325 TABLET ORAL DAILY
Refills: 0 | Status: DISCONTINUED | OUTPATIENT
Start: 2019-09-04 | End: 2019-09-09

## 2019-09-04 RX ADMIN — LORATADINE 10 MILLIGRAM(S): 10 TABLET ORAL at 12:43

## 2019-09-04 RX ADMIN — ATORVASTATIN CALCIUM 20 MILLIGRAM(S): 80 TABLET, FILM COATED ORAL at 21:54

## 2019-09-04 RX ADMIN — APIXABAN 5 MILLIGRAM(S): 2.5 TABLET, FILM COATED ORAL at 05:30

## 2019-09-04 RX ADMIN — MAGNESIUM OXIDE 400 MG ORAL TABLET 400 MILLIGRAM(S): 241.3 TABLET ORAL at 08:24

## 2019-09-04 RX ADMIN — Medication 325 MILLIGRAM(S): at 17:34

## 2019-09-04 RX ADMIN — MAGNESIUM OXIDE 400 MG ORAL TABLET 400 MILLIGRAM(S): 241.3 TABLET ORAL at 17:34

## 2019-09-04 RX ADMIN — Medication 650 MILLIGRAM(S): at 17:34

## 2019-09-04 RX ADMIN — CHLORHEXIDINE GLUCONATE 1 APPLICATION(S): 213 SOLUTION TOPICAL at 21:54

## 2019-09-04 RX ADMIN — MONTELUKAST 10 MILLIGRAM(S): 4 TABLET, CHEWABLE ORAL at 12:43

## 2019-09-04 RX ADMIN — Medication 40 MILLIGRAM(S): at 05:30

## 2019-09-04 RX ADMIN — ERTAPENEM SODIUM 120 MILLIGRAM(S): 1 INJECTION, POWDER, LYOPHILIZED, FOR SOLUTION INTRAMUSCULAR; INTRAVENOUS at 21:54

## 2019-09-04 RX ADMIN — MAGNESIUM OXIDE 400 MG ORAL TABLET 400 MILLIGRAM(S): 241.3 TABLET ORAL at 12:44

## 2019-09-04 RX ADMIN — Medication 650 MILLIGRAM(S): at 18:15

## 2019-09-04 RX ADMIN — Medication 20 MILLIEQUIVALENT(S): at 12:41

## 2019-09-04 RX ADMIN — APIXABAN 5 MILLIGRAM(S): 2.5 TABLET, FILM COATED ORAL at 17:34

## 2019-09-04 RX ADMIN — Medication 500 MILLIGRAM(S): at 12:42

## 2019-09-04 RX ADMIN — Medication 40 MILLIGRAM(S): at 17:34

## 2019-09-04 RX ADMIN — TIOTROPIUM BROMIDE 1 CAPSULE(S): 18 CAPSULE ORAL; RESPIRATORY (INHALATION) at 12:43

## 2019-09-04 RX ADMIN — FAMOTIDINE 20 MILLIGRAM(S): 10 INJECTION INTRAVENOUS at 12:42

## 2019-09-04 NOTE — PROGRESS NOTE ADULT - PROBLEM SELECTOR PLAN 7
- c/w ventolin inhaler, spiriva, and singular - SSI and monitor FS qAC and qHS   - Adjust regimen as necessary

## 2019-09-04 NOTE — PROGRESS NOTE ADULT - PROBLEM SELECTOR PLAN 2
- s/p ostomy bag and abdominal wound.   - Wound care c/s.   - Will consult surgery given concern for leaking from wound site and mesh in place  - Complicated surgical history at [facility], per notes reviewed: "history of ex lap, Lupe's procedure for perforated diverticulitis...multiple abdominal washouts and abdomen closed with strattice"  - Had CT A/P on 8/30 showing "Significant interval decrease in size of left lower anterior pelvic subcutaneous collection"  - Patient was discharged to rehab on Ertapenem x4 weeks per ID, will continue Ertapenem here.   - Patient was stable for discharge from ID and surgical perspective with outpatient follow-up during previous admission.

## 2019-09-04 NOTE — PROGRESS NOTE ADULT - ASSESSMENT
74F with PMH of asthma, A fib, dm, HFrEF, pacemaker/ AICD, perforated diverticulitis s/p L hemicolectomy and end colostomy presents from [facility] 2/2 tachypnea/lethargy.

## 2019-09-04 NOTE — ADVANCED PRACTICE NURSE CONSULT - REASON FOR CONSULT
Patient seen on skin care rounds after wound care referral received for assessment of skin impairment and recommendations of topical management. Chart reviewed: Serum albumin (N), Enrico (N), BMI (N), patient interviewed. Patient H/O asthma, A fib, DM, HFrEF, gout, HTN, pacemaker/AICD, perforated diverticulitis s/p L hemicolectomy and end colostomy in June hospitalization c/b multiple abdominal washouts and difficultly closing the abdomen for which strattice mesh was placed. Presents from [facility] 2/2 increased work of breathing after a session of PT. Daughter at bedside reports she has been at [facility] for 1 week for [facility] 2/2 being in and out of hospitals due to the diverticulitis. Admitted with SOB. Surgery consulted for abdominal wound evaluation (see full note for details), no surgical intervention at this time.

## 2019-09-04 NOTE — ADVANCED PRACTICE NURSE CONSULT - RECOMMEDATIONS
Recommend surgical f/u for monitoring of mesh separation, purulent drainage and slough tissue.    Midline abdomen: Cleanse with NS, pat dry. Apply Liquid barrier film to periwound skin. Apply Aquacel AG hydrofiber to wound base, pack depth and satellite lesion communicating area with aquacel ag hydrofiber, cover with gauze, and abdominal pads and secure with paper tape. Change once a day.    Left lateral heel: Apply Liquid barrier film daily.    B/L breast, pannus: Place Interdry textile sheeting, remove to wash & dry affected area, then replace. Individual sheeting may be used for up to 5 days unless soiled.     MAD/IAD: Cleanse with skin cleanser, pat dry. Apply Nystatin powder, dust off excess and seal in with TRIAD paste. Twice a day.    Continue low air loss bed therapy, heel elevation, continue to turn & reposition q2h,  continue moisture management with barrier creams & single breathable pad, continue measures to decrease friction/shear/pressure.     Please call wound care service line is further assistance is needed ([phone]).

## 2019-09-04 NOTE — ADVANCED PRACTICE NURSE CONSULT - ASSESSMENT
A&Ox3, bedbound, incontinent of urine, colostomy pouch in place (see assessment and intervention Colostomy section for details of stoma viability and pouch change). Skin warm, dry with increased moisture in intertriginous folds, adequate skin turgor, scattered areas of hyperpigmentation and hypopigmentation of lower legs. Left lateral heel with area of blanchable erythema, patient refuses use of z-float boots: currently using fluidized pillow to offload heels. Educated patient and daughter on importance of use to offload heels. Left lateral lower leg intact scab.    Mixed etiology of moisture and incontinence associated dermatitis of sacral fold to bilateral buttock: hyperpigmentation with purple hue, suspected candidiasis. Scattered areas of denuded epidermis that create symmetrical lesions on bilateral inner buttock when patient is in natural anatomical position.     Risk for moisture associated dermatitis in bilateral breast and pannus.    Midline abdominal wound, s/p surgery: measures [id number] (depth at distal end of wound). Tissue type 90% necrotic brown/yellow dry mesh that is lifting from wound base and edges from 4-8 o'clock exposing pink, moist, flat tissue and deep area that is communicating with a satellite lesion through deepest part of wound at 5 o'clock. The satellite lesion is 2cm from wound edge and measures [id number] (unable to determine complete anatomical depth of satellite lesion due to necrotic tissue)- 100% yellow, moist slough that is firmly attached to wound edges but able to probe with cotton swab under wound- slough is not attached to wound base. (+) purulent drainage from wound and able to be expressed with palpation of wound base beneath separation of mesh at distal end of the wound. There are multiple clear sutures noted at periphery of wound at 12, 3, 6 and 9 o'clock. Periwound skin with hypopigmentation and evidence of reepithelialization, no induration, no increased warmth, no erythema. Goal of care: decrease/control bioburden, manage exudate, monitor for changes (necrotic tissue and necrotic mesh exposed), recommend surgical f/u.

## 2019-09-04 NOTE — ADVANCED PRACTICE NURSE CONSULT - REASON FOR CONSULT
Attempted to see patient on Wound Care Rounds after referral placed for evaluation of abdominal wound: patient requesting tylenol prior to wound evaluation: primary RN aware and contacting primary team for tylenol order. Wound care team will f/u with patient when tylenol is given.

## 2019-09-04 NOTE — PHYSICAL THERAPY INITIAL EVALUATION ADULT - ADDITIONAL COMMENTS
Pt reports that she lives in a private house with her daughter and son in law with ~ 6 steps to enter; (+)bilateral handrails; bedroom/bathroom on first floor. Pt has been at [facility] for ~1 week where she reports that she was standing and taking a few "wobbly steps" with PT using a rolling walker. Prior to Rehab pt reports that she was completely independent and used either a single axis cane or rolling walker with ambulation. Pt denies any recent falls.    Pt left comfortable in bed, NAD, all lines intact, all precautions maintained, with call bell in reach, bed alarm on, and RN aware of PT evaluation.

## 2019-09-04 NOTE — PHYSICAL THERAPY INITIAL EVALUATION ADULT - PLANNED THERAPY INTERVENTIONS, PT EVAL
gait training/postural re-education/strengthening/transfer training/bed mobility training/balance training

## 2019-09-04 NOTE — PROGRESS NOTE ADULT - PROBLEM SELECTOR PLAN 3
EF = 45%   - Continue home medications of Lasix 40 mg PO daily and spironolactone 25 mg daily  - Appears euvolemic at this time

## 2019-09-04 NOTE — PHYSICAL THERAPY INITIAL EVALUATION ADULT - GENERAL OBSERVATIONS, REHAB EVAL
Pt encountered supine, no distress, AxOx2, with +IV, +cardiac monitor, abdominal dressing dry/intact, nephrostomy bag, and colostomy bag.

## 2019-09-04 NOTE — PROGRESS NOTE ADULT - PROBLEM SELECTOR PLAN 1
- Patient sent to ED for evaluation of increased work of breathing at [facility], etiology unclear although resolved at this time.   CT C/A/P with no significant change from 8/30  - "Left lower lobe complete collapse again seen" - this may be due to atelectasis or mucoid impaction, however patient clinically does not have PNA as she is afebrile and has no cough. Will monitor off antibiotics (already on Ertapenem for abd abscess). Incentive spirometry.   - WOB may be related to deconditioning in setting of recent hospitalization and complex surgical history   - There is no evidence of PE on CTA chest  - S/p solumedrol in ED - does not appear to be asthma exacerbation as patient without wheezing or increased RR on exam. Will hold off continuing steroids. Duonebs prn   - Monitor respiratory status.

## 2019-09-04 NOTE — PROGRESS NOTE ADULT - SUBJECTIVE AND OBJECTIVE BOX
Shira Gilmore MD  Division of Hospital Medicine  Pager # [phone]    Patient is a 74y old  Female who presents with a chief complaint of Lethargy and tachypnea (03 Sep 2019 13:43)      INTERVAL HPI/OVERNIGHT EVENTS: Patient seen and examined at bedside. She states her breathing is at baseline, but has an occasional wheeze. Denies chest pain or palpitations. Per RN, abd wound w/ oozing and mesh is lifting off.     MEDICATIONS (STANDING):  apixaban 5 milliGRAM(s) Oral every 12 hours  ascorbic acid 500 milliGRAM(s) Oral daily  atorvastatin 20 milliGRAM(s) Oral at bedtime  chlorhexidine 4% Liquid 1 Application(s) Topical at bedtime  dextrose 5%. 1000 milliLiter(s) IV Continuous <Continuous>  dextrose 50% Injectable 12.5 Gram(s) IV Push once  dextrose 50% Injectable 25 Gram(s) IV Push once  dextrose 50% Injectable 25 Gram(s) IV Push once  ertapenem  IVPB      ertapenem  IVPB 1000 milliGRAM(s) IV Intermittent every 24 hours  famotidine    Tablet 20 milliGRAM(s) Oral daily  furosemide    Tablet 40 milliGRAM(s) Oral two times a day  insulin lispro (HumaLOG) corrective regimen sliding scale   SubCutaneous three times a day before meals  loratadine 10 milliGRAM(s) Oral daily  magnesium oxide 400 milliGRAM(s) Oral three times a day with meals  montelukast 10 milliGRAM(s) Oral daily  tiotropium 18 MICROgram(s) Capsule 1 Capsule(s) Inhalation daily    MEDICATIONS  (PRN):  ALBUTerol/ipratropium for Nebulization 3 milliLiter(s) Nebulizer every 6 hours PRN  dextrose 40% Gel 15 Gram(s) Oral once PRN  glucagon  Injectable 1 milliGRAM(s) IntraMuscular once PRN  guaiFENesin    Syrup 100 milliGRAM(s) Oral every 6 hours PRN      T(F): 98.3 (09-04-19 @ 13:00), Max: 98.3 (09-04-19 @ 13:00)  HR: 79 (09-04-19 @ 13:00) (78 - 88)  BP: 128/54 (09-04-19 @ 13:00) (108/54 - 141/52)  RR: 17 (09-04-19 @ 13:00) (16 - 18)  SpO2: 96% (09-04-19 @ 13:00) (96% - 100%)  Wt(kg): --  CAPILLARY BLOOD GLUCOSE      POCT Blood Glucose.: 117 mg/dL (04 Sep 2019 12:18)  POCT Blood Glucose.: 138 mg/dL (04 Sep 2019 07:50)  POCT Blood Glucose.: 171 mg/dL (03 Sep 2019 22:18)  POCT Blood Glucose.: 167 mg/dL (03 Sep 2019 18:56)  POCT Blood Glucose.: 183 mg/dL (03 Sep 2019 13:47)    I&O's Summary    PHYSICAL EXAM:  GENERAL: NAD, well-groomed, well-developed  ENMT: Moist mucous membranes, a dentulous   NECK: Supple, No JVD, Normal thyroid  CHEST/LUNG: Clear to percussion bilaterally; No rales, rhonchi, wheezing, or rubs  HEART: Regular rate and rhythm; No murmurs, rubs, or gallops  ABDOMEN: Soft, mild tenderness to palpation. Large abd dressing in place.   EXTREMITIES:  2+ Peripheral Pulses, No clubbing, cyanosis, or edema  NERVOUS SYSTEM:  Alert & Oriented X3, follows commands, answers simple questions        LABS:                        7.3    8.20  )-----------( 249      ( 04 Sep 2019 05:30 )             24.2     09-04    141  |  102  |  16  ----------------------------<  136<H>  3.5   |  27  |  0.67    Ca    8.4      04 Sep 2019 05:30  Phos  2.7     09-04  Mg     2.2     09-04    TPro  6.5  /  Alb  2.6<L>  /  TBili  0.2  /  DBili  x   /  AST  10  /  ALT  6   /  AlkPhos  103  09-04    PT/INR - ( 04 Sep 2019 05:30 )   PT: 18.6 SEC;   INR: 1.61          PTT - ( 02 Sep 2019 19:40 )  PTT:31.3 SEC        RADIOLOGY & ADDITIONAL TESTS:      Plan discussed with:

## 2019-09-04 NOTE — CONSULT NOTE ADULT - SUBJECTIVE AND OBJECTIVE BOX
CC: 74y old Female admitted with a chief complaint of Lethargy and tachypnea    HPI:  75 yo Female with PMH of asthma, A fib, dm, HFrEF, pacemake/ AICD, perforated diverticulitis s/p L hemicolectomy and end colostomy presents from [facility] 2/2 increased work of breathing after a session of PT. Daughter at bedside reports she has been at [facility] for 1 week for [facility] 2/2 being in and out of hospitals due to the diverticulitis. Daughter reports she was a little out of it but was always mentating, wasn't complaining of anything to her knowledge. Patient has no complaints at this time. Normally lives at home with her daughter. Daughter reports she is always tachycardic but does not know why. Denies CP during interview.    Surgery consulted to evaluated abd. wound due to concern for purulent drainage. Patient being follow for this wound had complicated hospital course at [facility] in july after a Lupe's procedure that required multiple abdominal washouts and difficultly closing the abdomen for which strattice mesh was placed. Patient last seen in the ED at [facility] with purulent drainage from inferior aspect of wound which is still present and per daughter drain output appear same as prior.      PMHx: Refusal of blood transfusions as patient is [facility]  Patient is [facility]  Vertigo  Atrial flutter  Diverticulitis  Cardiomyopathy  Kidney stone  COPD (chronic obstructive pulmonary disease)  Cardiac Pacemaker  HTN - Hypertension  Gout  Diabetes  Congestive Heart Failure  Asthma    PSHx: S/P cholecystectomy  AICD (Automatic Cardioverter/Defibrillator) Present  S/P Cholecystectomy    Medications (inpatient): apixaban 5 milliGRAM(s) Oral every 12 hours  ascorbic acid 500 milliGRAM(s) Oral daily  atorvastatin 20 milliGRAM(s) Oral at bedtime  chlorhexidine 4% Liquid 1 Application(s) Topical at bedtime  dextrose 5%. 1000 milliLiter(s) IV Continuous <Continuous>  dextrose 50% Injectable 12.5 Gram(s) IV Push once  dextrose 50% Injectable 25 Gram(s) IV Push once  dextrose 50% Injectable 25 Gram(s) IV Push once  ertapenem  IVPB      ertapenem  IVPB 1000 milliGRAM(s) IV Intermittent every 24 hours  famotidine    Tablet 20 milliGRAM(s) Oral daily  ferrous    sulfate 325 milliGRAM(s) Oral daily  furosemide    Tablet 40 milliGRAM(s) Oral two times a day  insulin lispro (HumaLOG) corrective regimen sliding scale   SubCutaneous three times a day before meals  loratadine 10 milliGRAM(s) Oral daily  magnesium oxide 400 milliGRAM(s) Oral three times a day with meals  montelukast 10 milliGRAM(s) Oral daily  tiotropium 18 MICROgram(s) Capsule 1 Capsule(s) Inhalation daily    Medications (PRN):ALBUTerol/ipratropium for Nebulization 3 milliLiter(s) Nebulizer every 6 hours PRN  dextrose 40% Gel 15 Gram(s) Oral once PRN  glucagon  Injectable 1 milliGRAM(s) IntraMuscular once PRN  guaiFENesin    Syrup 100 milliGRAM(s) Oral every 6 hours PRN    Allergies: Coreg (Other)  digoxin (Other; Short breath (Mild to Mod))  IV Contrast (Unknown)  penicillins (Hives)  (Intolerances: metoprolol (Other)  Solu-Medrol (Other (Mild to Mod))  )  Social Hx:   Family Hx: Family history of brain tumor      Physical Exam  T(C): 36.8  HR: 94 (78 - 94)  BP: 124/60 (123/60 - 128/54)  RR: 17 (17 - 18)  SpO2: 96% (96% - 100%)  Tmax: T(C): , Max: 36.8 (09-04-19 @ 13:00)    General: well developed, well nourished, NAD  Neuro: alert and oriented, no focal deficits, moves all extremities spontaneously  HEENT: NCAT, EOMI, anicteric, mucosa moist  Respiratory: airway patent, respirations unlabored  CVS: regular rate and rhythm  Abdomen: soft, midline abd. wound with necrotic tissue, inferior aspect of wound draining purulent fluid, ostomy pink/viable    Extremities: no edema, sensation and movement grossly intact  Skin: warm, dry, appropriate color    Labs:                        7.3    8.20  )-----------( 249      ( 04 Sep 2019 05:30 )             24.2     PT/INR - ( 04 Sep 2019 05:30 )   PT: 18.6 SEC;   INR: 1.61          PTT - ( 02 Sep 2019 19:40 )  PTT:31.3 SEC  09-04    141  |  102  |  16  ----------------------------<  136<H>  3.5   |  27  |  0.67    Ca    8.4      04 Sep 2019 05:30  Phos  2.7     09-04  Mg     2.2     09-04    TPro  6.5  /  Alb  2.6<L>  /  TBili  0.2  /  DBili  x   /  AST  10  /  ALT  6   /  AlkPhos  103  09-04            Imaging and other studies:  < from: CT Abdomen and Pelvis w/ IV Cont (09.03.19 @ 01:46) >  ABDOMEN AND PELVIS:    LIVER: Within normal limits.  BILE DUCTS: Normal caliber.  GALLBLADDER: Cholecystectomy.  SPLEEN: Within normal limits.  PANCREAS: Within normal limits.  ADRENALS: Within normal limits.  KIDNEYS/URETERS: Left lower pole nonobstructing calculus measures 0.6 cm.   Left upper pole nonobstructing calculus measures 0.5 cm. No   hydronephrosis.    BLADDER: Within normal limits.  REPRODUCTIVE ORGANS: No adnexal mass. Calcified fibroids. The endometrium   measures 0.9 cm.    BOWEL: No bowel obstruction. Left hemicolectomy with Lupe's pouch and   right lower quadrant ileostomy. Colonic diverticulosis.  PERITONEUM: Pigtail drainage catheter within a left paracolic gutter   collection measuring 8.0 x 2.3 x 3.3 cm, previously 8.1 x 4.2 x 4.1 cm.  VESSELS: Atherosclerotic changes.  RETROPERITONEUM/LYMPH NODES: No lymphadenopathy.    ABDOMINAL WALL: Open ventral wall incision with associated large ventral   hernia. A pigtaildrainage catheter is again noted in the lower anterior   subcutaneous soft tissues of the abdominal wall without residual fluid   collection.  BONES: Degenerative changes.    IMPRESSION:     No CT evidence of pulmonary embolus.

## 2019-09-04 NOTE — PHYSICAL THERAPY INITIAL EVALUATION ADULT - PATIENT PROFILE REVIEW, REHAB EVAL
ACTIVITY: Ambulate as Tolerated; spoke with RN Natalie Javier prior to PT evaluation--> Pt OK for PT consult/yes

## 2019-09-04 NOTE — CONSULT NOTE ADULT - ASSESSMENT
73 y/o s/p Lupe's in 7/2019 for perf. diverticulitis with inferior portion of wound draining purulent     - no surgical intervention at this time   - patient recently seen as [facility] and wound and CT scans unchanged from that time   - c/w IV abx   - care per primary team     Discussed with attending Dr. Jr GONG Team [phone]

## 2019-09-04 NOTE — PHYSICAL THERAPY INITIAL EVALUATION ADULT - DIAGNOSIS, PT EVAL
Pt admitted for sepsis 2/2 PNA; pt presents with decreased strength, decreased balance, and difficulty with ambulation.

## 2019-09-04 NOTE — CHART NOTE - NSCHARTNOTEFT_GEN_A_CORE
Spoke with patient's daughter, Yeimy Cha. She thinks her mother's episode of breathing difficultly was related to strong smelling cleaning wipes which exacerbated her severe asthma. States that her mother's breathing is now at baseline. She informed me that patient is a [employer] and does not accept blood products, but does accept iron and Epogen, which she would like patient to get. Will start patient on iron supplements. Informed daughter that wound care and surgery will be evaluating patient regarding her abdominal wound. All other questions answered.     Shira Gilmore MD  Pager #[phone]

## 2019-09-04 NOTE — PROGRESS NOTE ADULT - PROBLEM SELECTOR PLAN 4
- C/w on Eliquis 5mg PO BID  - Maintain K+ > 4.0 and Mg > 2.0 - Patient is [employer], does not accept blood products. Accepts iron and Epogen per daughter   - No acute evidence of bleeding   - Will send iron studies in AM  - Start on iron supplementation

## 2019-09-04 NOTE — PHYSICAL THERAPY INITIAL EVALUATION ADULT - PERTINENT HX OF CURRENT PROBLEM, REHAB EVAL
75 yo Female with PMH of asthma, A fib, dm, HFrEF, pacemaker/ AICD, perforated diverticulitis s/p Left hemicolectomy and end colostomy presents from [facility] 2/2 tachypnea/lethargy found to be septic concern for PNA

## 2019-09-05 DIAGNOSIS — K57.20 DIVERTICULITIS OF LARGE INTESTINE WITH PERFORATION AND ABSCESS WITHOUT BLEEDING: ICD-10-CM

## 2019-09-05 DIAGNOSIS — D72.828 OTHER ELEVATED WHITE BLOOD CELL COUNT: ICD-10-CM

## 2019-09-05 LAB
FERRITIN SERPL-MCNC: 1305 NG/ML — HIGH (ref 15–150)
FOLATE SERPL-MCNC: 8.4 NG/ML — SIGNIFICANT CHANGE UP (ref 4.7–20)
GLUCOSE BLDC GLUCOMTR-MCNC: 82 MG/DL — SIGNIFICANT CHANGE UP (ref 70–99)
HCT VFR BLD CALC: 27.1 % — LOW (ref 34.5–45)
HGB BLD-MCNC: 8 G/DL — LOW (ref 11.5–15.5)
IRON SATN MFR SERPL: 116 UG/DL — LOW (ref 140–530)
IRON SATN MFR SERPL: 45 UG/DL — SIGNIFICANT CHANGE UP (ref 30–160)
MCHC RBC-ENTMCNC: 28.9 PG — SIGNIFICANT CHANGE UP (ref 27–34)
MCHC RBC-ENTMCNC: 29.5 % — LOW (ref 32–36)
MCV RBC AUTO: 97.8 FL — SIGNIFICANT CHANGE UP (ref 80–100)
NRBC # FLD: 0 K/UL — SIGNIFICANT CHANGE UP (ref 0–0)
PLATELET # BLD AUTO: 289 K/UL — SIGNIFICANT CHANGE UP (ref 150–400)
PMV BLD: 9.5 FL — SIGNIFICANT CHANGE UP (ref 7–13)
RBC # BLD: 2.77 M/UL — LOW (ref 3.8–5.2)
RBC # FLD: 20.4 % — HIGH (ref 10.3–14.5)
UIBC SERPL-MCNC: 70.7 UG/DL — LOW (ref 110–370)
VIT B12 SERPL-MCNC: > 2000 PG/ML — HIGH (ref 200–900)
WBC # BLD: 14.86 K/UL — HIGH (ref 3.8–10.5)
WBC # FLD AUTO: 14.86 K/UL — HIGH (ref 3.8–10.5)

## 2019-09-05 PROCEDURE — 99233 SBSQ HOSP IP/OBS HIGH 50: CPT

## 2019-09-05 RX ORDER — BUDESONIDE AND FORMOTEROL FUMARATE DIHYDRATE 160; 4.5 UG/1; UG/1
2 AEROSOL RESPIRATORY (INHALATION)
Refills: 0 | Status: DISCONTINUED | OUTPATIENT
Start: 2019-09-05 | End: 2019-09-09

## 2019-09-05 RX ORDER — FUROSEMIDE 40 MG
40 TABLET ORAL DAILY
Refills: 0 | Status: DISCONTINUED | OUTPATIENT
Start: 2019-09-06 | End: 2019-09-09

## 2019-09-05 RX ORDER — ACETAMINOPHEN 500 MG
650 TABLET ORAL ONCE
Refills: 0 | Status: COMPLETED | OUTPATIENT
Start: 2019-09-05 | End: 2019-09-05

## 2019-09-05 RX ORDER — IPRATROPIUM/ALBUTEROL SULFATE 18-103MCG
3 AEROSOL WITH ADAPTER (GRAM) INHALATION EVERY 8 HOURS
Refills: 0 | Status: DISCONTINUED | OUTPATIENT
Start: 2019-09-05 | End: 2019-09-09

## 2019-09-05 RX ORDER — SPIRONOLACTONE 25 MG/1
25 TABLET, FILM COATED ORAL DAILY
Refills: 0 | Status: DISCONTINUED | OUTPATIENT
Start: 2019-09-05 | End: 2019-09-09

## 2019-09-05 RX ADMIN — APIXABAN 5 MILLIGRAM(S): 2.5 TABLET, FILM COATED ORAL at 06:30

## 2019-09-05 RX ADMIN — Medication 5 MILLIGRAM(S): at 18:32

## 2019-09-05 RX ADMIN — APIXABAN 5 MILLIGRAM(S): 2.5 TABLET, FILM COATED ORAL at 17:11

## 2019-09-05 RX ADMIN — ATORVASTATIN CALCIUM 20 MILLIGRAM(S): 80 TABLET, FILM COATED ORAL at 22:01

## 2019-09-05 RX ADMIN — TIOTROPIUM BROMIDE 1 CAPSULE(S): 18 CAPSULE ORAL; RESPIRATORY (INHALATION) at 15:13

## 2019-09-05 RX ADMIN — Medication 650 MILLIGRAM(S): at 16:10

## 2019-09-05 RX ADMIN — Medication 40 MILLIGRAM(S): at 06:30

## 2019-09-05 RX ADMIN — SPIRONOLACTONE 25 MILLIGRAM(S): 25 TABLET, FILM COATED ORAL at 22:04

## 2019-09-05 RX ADMIN — Medication 325 MILLIGRAM(S): at 15:14

## 2019-09-05 RX ADMIN — MAGNESIUM OXIDE 400 MG ORAL TABLET 400 MILLIGRAM(S): 241.3 TABLET ORAL at 17:11

## 2019-09-05 RX ADMIN — FAMOTIDINE 20 MILLIGRAM(S): 10 INJECTION INTRAVENOUS at 15:14

## 2019-09-05 RX ADMIN — CHLORHEXIDINE GLUCONATE 1 APPLICATION(S): 213 SOLUTION TOPICAL at 22:04

## 2019-09-05 RX ADMIN — MAGNESIUM OXIDE 400 MG ORAL TABLET 400 MILLIGRAM(S): 241.3 TABLET ORAL at 08:05

## 2019-09-05 RX ADMIN — MAGNESIUM OXIDE 400 MG ORAL TABLET 400 MILLIGRAM(S): 241.3 TABLET ORAL at 15:14

## 2019-09-05 RX ADMIN — MONTELUKAST 10 MILLIGRAM(S): 4 TABLET, CHEWABLE ORAL at 15:14

## 2019-09-05 RX ADMIN — Medication 500 MILLIGRAM(S): at 15:14

## 2019-09-05 RX ADMIN — Medication 40 MILLIGRAM(S): at 17:11

## 2019-09-05 RX ADMIN — LORATADINE 10 MILLIGRAM(S): 10 TABLET ORAL at 15:14

## 2019-09-05 RX ADMIN — ERTAPENEM SODIUM 120 MILLIGRAM(S): 1 INJECTION, POWDER, LYOPHILIZED, FOR SOLUTION INTRAMUSCULAR; INTRAVENOUS at 22:00

## 2019-09-05 RX ADMIN — Medication 3 MILLILITER(S): at 23:24

## 2019-09-05 RX ADMIN — Medication 650 MILLIGRAM(S): at 15:14

## 2019-09-05 RX ADMIN — Medication 3 MILLILITER(S): at 13:33

## 2019-09-05 NOTE — PROGRESS NOTE ADULT - PROBLEM SELECTOR PLAN 1
Patient sent to ED for evaluation of increased work of breathing at [facility], etiology unclear although resolved at this time. CT C/A/P with no significant change from 8/30  - "Left lower lobe complete collapse again seen" - this may be due to atelectasis or mucoid impaction, however patient clinically does not have PNA as she is afebrile and has no cough. Will monitor off antibiotics (already on Ertapenem for abd abscess). Incentive spirometry.   -  no evidence of PE on CTA chest  -  SOB most likely 2/2 reactive airway disease; s/p solumedrol in ED, will not continue.  Continue with spiriva, resume symbicort (recently seen by pulm at [facility])  - currently no need for O2  - no s/s of CHF/fluid overload

## 2019-09-05 NOTE — PROGRESS NOTE ADULT - PROBLEM SELECTOR PLAN 8
- SSI and monitor FS qAC and qHS   - Adjust regimen as necessary  - if BG remain at this level can like dc of BG checks

## 2019-09-05 NOTE — PROGRESS NOTE ADULT - PROBLEM SELECTOR PLAN 3
EF = 45%, does not appear to be in HF  - c/w Lasix 40 mg PO daily   - resume spironolactone 25 mg daily  - resume enalapril 5 mg BID  - has OP HF f/u with Dr. Hobson on 9/9

## 2019-09-05 NOTE — CHART NOTE - NSCHARTNOTEFT_GEN_A_CORE
Called Surgery team at [phone] to review plan of care: Surgery would recommended tube study while admitted by IR to evaluate drainage and positioning of drains in place. team will also re-eval abdominal mesh either this evening or in AM schedule permitting.

## 2019-09-05 NOTE — PROGRESS NOTE ADULT - PROBLEM SELECTOR PLAN 2
s/p Lupe's for [facility] IV perforated diverticulitis of the transverse colon on 7/2/19 with RTOR for multiple washouts and difficulty closing abdominal wall due to bowel edema and fascial retraction who then had strattice placed to close the abdomen with a vac over the strattice. Pt was recently admitted for new left lower ventral abdominal wall fluid collection with intraperitoneal communication at the level of the sigmoid colon and a left paracolic gutter collection s/p IR drainage of both collections (discharged to rehab with Invanz and PICC line).  - Wound care consult appreciated    - appreciate surgical eval, on 8/31 were discussing drain study, will ask if this is needed prior to dc   - per ID was planned for Ertapenem x4 weeks per ID, c/w ertapenem s/p Lupe's for [place] IV perforated diverticulitis of the transverse colon on 7/2/19 with RTOR for multiple washouts and difficulty closing abdominal wall due to bowel edema and fascial retraction who then had strattice placed to close the abdomen with a vac over the strattice. Pt was recently admitted for new left lower ventral abdominal wall fluid collection with intraperitoneal communication at the level of the sigmoid colon and a left paracolic gutter collection s/p IR drainage of both collections (discharged to rehab with Invanz and PICC line).  - Wound care consult appreciated    - appreciate surgical eval, on 8/31 were discussing drain study, will ask if this is needed prior to dc   - per ID was planned for Ertapenem x4 weeks per ID, c/w ertapenem (started 8/22, 4 wks 9/19/19)  - also would need repeat CT per ID ~9/16

## 2019-09-05 NOTE — PROGRESS NOTE ADULT - SUBJECTIVE AND OBJECTIVE BOX
Patient is a 74y old  Female who presents with a chief complaint of Lethargy and tachypnea    SUBJECTIVE / OVERNIGHT EVENTS:    Feels OK, has pain at the R shin/ankle  No CP  SOB is ok, states at [facility] would get winded when showering and stuff     MEDICATIONS  (STANDING):  ALBUTerol/ipratropium for Nebulization 3 milliLiter(s) Nebulizer every 8 hours  apixaban 5 milliGRAM(s) Oral every 12 hours  ascorbic acid 500 milliGRAM(s) Oral daily  atorvastatin 20 milliGRAM(s) Oral at bedtime  buDESOnide  80 MICROgram(s)/formoterol 4.5 MICROgram(s) Inhaler 2 Puff(s) Inhalation two times a day  chlorhexidine 4% Liquid 1 Application(s) Topical at bedtime  enalapril 5 milliGRAM(s) Oral two times a day  ertapenem  IVPB 1000 milliGRAM(s) IV Intermittent every 24 hours  famotidine    Tablet 20 milliGRAM(s) Oral daily  ferrous    sulfate 325 milliGRAM(s) Oral daily  insulin lispro (HumaLOG) corrective regimen sliding scale   SubCutaneous three times a day before meals  insulin lispro (HumaLOG) corrective regimen sliding scale   SubCutaneous at bedtime  loratadine 10 milliGRAM(s) Oral daily  magnesium oxide 400 milliGRAM(s) Oral three times a day with meals  montelukast 10 milliGRAM(s) Oral daily  spironolactone 25 milliGRAM(s) Oral daily  tiotropium 18 MICROgram(s) Capsule 1 Capsule(s) Inhalation daily    MEDICATIONS  (PRN):  dextrose 40% Gel 15 Gram(s) Oral once PRN Blood Glucose LESS THAN 70 milliGRAM(s)/deciliter  glucagon  Injectable 1 milliGRAM(s) IntraMuscular once PRN Glucose LESS THAN 70 milligrams/deciliter  guaiFENesin    Syrup 100 milliGRAM(s) Oral every 6 hours PRN Cough    T(C): 36.6 (09-05-19 @ 12:31), Max: 36.7 (09-05-19 @ 06:28)  HR: 99 (09-05-19 @ 17:10) (83 - 99)  BP: 139/52 (09-05-19 @ 17:10) (122/54 - 139/76)  RR: 18 (09-05-19 @ 12:31) (18 - 19)  SpO2: 96% (09-05-19 @ 12:31) (95% - 96%)    CAPILLARY BLOOD GLUCOSE  POCT Blood Glucose.: 109 mg/dL (05 Sep 2019 17:05)  POCT Blood Glucose.: 105 mg/dL (05 Sep 2019 12:06)  POCT Blood Glucose.: 82 mg/dL (05 Sep 2019 07:47)  POCT Blood Glucose.: 117 mg/dL (04 Sep 2019 22:31)    PHYSICAL EXAM:  GENERAL: NAD, mild increase WOB when speaking, on RA  CHEST/LUNG: Clear to auscultation bilaterally; low exp wheeze intermittently   HEART: Regular rate and rhythm; No murmurs, rubs, or gallops  ABDOMEN: + RLQ ostomy, abdominal dressing anteriorly c/d/i, L sided pigtail x 2 draining greyish/brown material   EXTREMITIES:   wwp, no edema   PSYCH: AAOx3  NEUROLOGY: non-focal  SKIN: abdominal wound (see wound care eval)     LABS:                        8.0    14.86 )-----------( 289      ( 05 Sep 2019 08:29 )             27.1     09-04    141  |  102  |  16  ----------------------------<  136<H>  3.5   |  27  |  0.67    Ca    8.4      04 Sep 2019 05:30  Phos  2.7     09-04  Mg     2.2     09-04    TPro  6.5  /  Alb  2.6<L>  /  TBili  0.2  /  DBili  x   /  AST  10  /  ALT  6   /  AlkPhos  103  09-04    PT/INR - ( 04 Sep 2019 05:30 )   PT: 18.6 SEC;   INR: 1.61       Microbiology: Culture Results:   No growth at 5 days. (08-30 @ 18:43)  Culture Results:   Few Escherichia coli  Rare Enterococcus faecalis  Rare Candida albicans "Susceptibilities not performed" (08-30 @ 18:42)  Culture Results:   No growth at 5 days. (08-30 @ 18:00)    Consultant(s) Notes Reviewed:  surgery, wound care   Care Discussed with Consultants/Other Providers:

## 2019-09-05 NOTE — PROGRESS NOTE ADULT - PROBLEM SELECTOR PLAN 5
Patient is Rastafarian, does not accept blood products. Accepts iron and Epogen per daughter   - No acute evidence of bleeding   - iron studies c/w AOCD likely in setting of infection/inflammation

## 2019-09-05 NOTE — PROGRESS NOTE ADULT - ASSESSMENT
73 yo with COPD/asthma, atrial fibrillation on ac, DM2, HFrEF s/p AICD, perforated diverticulitis s/p L hemicolectomy and end colostomy with poor wound closure and abdominal wound, L paracolic gutter collection s/p multiple return to OR and IR drains presenting from [facility] due to tachypnea/lethargy which has resolved.

## 2019-09-06 LAB
ANION GAP SERPL CALC-SCNC: 13 MMO/L — SIGNIFICANT CHANGE UP (ref 7–14)
BUN SERPL-MCNC: 10 MG/DL — SIGNIFICANT CHANGE UP (ref 7–23)
CALCIUM SERPL-MCNC: 8.7 MG/DL — SIGNIFICANT CHANGE UP (ref 8.4–10.5)
CHLORIDE SERPL-SCNC: 99 MMOL/L — SIGNIFICANT CHANGE UP (ref 98–107)
CO2 SERPL-SCNC: 27 MMOL/L — SIGNIFICANT CHANGE UP (ref 22–31)
CREAT SERPL-MCNC: 0.58 MG/DL — SIGNIFICANT CHANGE UP (ref 0.5–1.3)
GLUCOSE SERPL-MCNC: 96 MG/DL — SIGNIFICANT CHANGE UP (ref 70–99)
HCT VFR BLD CALC: 26.1 % — LOW (ref 34.5–45)
HGB BLD-MCNC: 8.1 G/DL — LOW (ref 11.5–15.5)
MAGNESIUM SERPL-MCNC: 1.4 MG/DL — LOW (ref 1.6–2.6)
MCHC RBC-ENTMCNC: 29.5 PG — SIGNIFICANT CHANGE UP (ref 27–34)
MCHC RBC-ENTMCNC: 31 % — LOW (ref 32–36)
MCV RBC AUTO: 94.9 FL — SIGNIFICANT CHANGE UP (ref 80–100)
NRBC # FLD: 0 K/UL — SIGNIFICANT CHANGE UP (ref 0–0)
PLATELET # BLD AUTO: 287 K/UL — SIGNIFICANT CHANGE UP (ref 150–400)
PMV BLD: 9.4 FL — SIGNIFICANT CHANGE UP (ref 7–13)
POTASSIUM SERPL-MCNC: 3.2 MMOL/L — LOW (ref 3.5–5.3)
POTASSIUM SERPL-SCNC: 3.2 MMOL/L — LOW (ref 3.5–5.3)
RBC # BLD: 2.75 M/UL — LOW (ref 3.8–5.2)
RBC # FLD: 20.8 % — HIGH (ref 10.3–14.5)
SODIUM SERPL-SCNC: 139 MMOL/L — SIGNIFICANT CHANGE UP (ref 135–145)
WBC # BLD: 13.96 K/UL — HIGH (ref 3.8–10.5)
WBC # FLD AUTO: 13.96 K/UL — HIGH (ref 3.8–10.5)

## 2019-09-06 PROCEDURE — 76080 X-RAY EXAM OF FISTULA: CPT | Mod: 26,59

## 2019-09-06 PROCEDURE — 99233 SBSQ HOSP IP/OBS HIGH 50: CPT

## 2019-09-06 PROCEDURE — 49424 ASSESS CYST CONTRAST INJECT: CPT

## 2019-09-06 RX ORDER — POTASSIUM CHLORIDE 20 MEQ
20 PACKET (EA) ORAL ONCE
Refills: 0 | Status: COMPLETED | OUTPATIENT
Start: 2019-09-06 | End: 2019-09-06

## 2019-09-06 RX ORDER — MAGNESIUM SULFATE 500 MG/ML
2 VIAL (ML) INJECTION ONCE
Refills: 0 | Status: COMPLETED | OUTPATIENT
Start: 2019-09-06 | End: 2019-09-06

## 2019-09-06 RX ADMIN — Medication 500 MILLIGRAM(S): at 13:19

## 2019-09-06 RX ADMIN — Medication 325 MILLIGRAM(S): at 13:17

## 2019-09-06 RX ADMIN — APIXABAN 5 MILLIGRAM(S): 2.5 TABLET, FILM COATED ORAL at 06:44

## 2019-09-06 RX ADMIN — CHLORHEXIDINE GLUCONATE 1 APPLICATION(S): 213 SOLUTION TOPICAL at 22:54

## 2019-09-06 RX ADMIN — MAGNESIUM OXIDE 400 MG ORAL TABLET 400 MILLIGRAM(S): 241.3 TABLET ORAL at 17:53

## 2019-09-06 RX ADMIN — LORATADINE 10 MILLIGRAM(S): 10 TABLET ORAL at 13:17

## 2019-09-06 RX ADMIN — TIOTROPIUM BROMIDE 1 CAPSULE(S): 18 CAPSULE ORAL; RESPIRATORY (INHALATION) at 13:19

## 2019-09-06 RX ADMIN — ERTAPENEM SODIUM 120 MILLIGRAM(S): 1 INJECTION, POWDER, LYOPHILIZED, FOR SOLUTION INTRAMUSCULAR; INTRAVENOUS at 22:54

## 2019-09-06 RX ADMIN — APIXABAN 5 MILLIGRAM(S): 2.5 TABLET, FILM COATED ORAL at 17:53

## 2019-09-06 RX ADMIN — FAMOTIDINE 20 MILLIGRAM(S): 10 INJECTION INTRAVENOUS at 13:17

## 2019-09-06 RX ADMIN — MAGNESIUM OXIDE 400 MG ORAL TABLET 400 MILLIGRAM(S): 241.3 TABLET ORAL at 13:17

## 2019-09-06 RX ADMIN — Medication 3 MILLILITER(S): at 14:14

## 2019-09-06 RX ADMIN — BUDESONIDE AND FORMOTEROL FUMARATE DIHYDRATE 2 PUFF(S): 160; 4.5 AEROSOL RESPIRATORY (INHALATION) at 08:26

## 2019-09-06 RX ADMIN — Medication 3 MILLILITER(S): at 08:00

## 2019-09-06 RX ADMIN — Medication 20 MILLIEQUIVALENT(S): at 08:25

## 2019-09-06 RX ADMIN — ATORVASTATIN CALCIUM 20 MILLIGRAM(S): 80 TABLET, FILM COATED ORAL at 22:54

## 2019-09-06 RX ADMIN — Medication 40 MILLIGRAM(S): at 06:44

## 2019-09-06 RX ADMIN — SPIRONOLACTONE 25 MILLIGRAM(S): 25 TABLET, FILM COATED ORAL at 06:44

## 2019-09-06 RX ADMIN — MONTELUKAST 10 MILLIGRAM(S): 4 TABLET, CHEWABLE ORAL at 13:17

## 2019-09-06 RX ADMIN — BUDESONIDE AND FORMOTEROL FUMARATE DIHYDRATE 2 PUFF(S): 160; 4.5 AEROSOL RESPIRATORY (INHALATION) at 22:54

## 2019-09-06 RX ADMIN — Medication 3 MILLILITER(S): at 22:22

## 2019-09-06 RX ADMIN — Medication 50 GRAM(S): at 08:26

## 2019-09-06 RX ADMIN — Medication 5 MILLIGRAM(S): at 17:53

## 2019-09-06 RX ADMIN — Medication 5 MILLIGRAM(S): at 06:44

## 2019-09-06 NOTE — PROGRESS NOTE ADULT - ASSESSMENT
75 yo with COPD/asthma, atrial fibrillation on ac, DM2, HFrEF s/p AICD, perforated diverticulitis s/p L hemicolectomy and end colostomy with poor wound closure and abdominal wound, L paracolic gutter collection s/p multiple return to OR and IR drains presenting from [facility] due to tachypnea/lethargy which has resolved.

## 2019-09-06 NOTE — PROGRESS NOTE ADULT - PROBLEM SELECTOR PLAN 2
s/p Lupe's for Hinchey IV perforated diverticulitis of the transverse colon on 7/2/19 with RTOR for multiple washouts and difficulty closing abdominal wall due to bowel edema and fascial retraction who then had strattice placed to close the abdomen with a vac over the strattice. Pt was recently admitted for new left lower ventral abdominal wall fluid collection with intraperitoneal communication at the level of the sigmoid colon and a left paracolic gutter collection s/p IR drainage of both collections (discharged to rehab with Invanz and PICC line).  - Wound care consult appreciated    - appreciate surgical eval  - IR drain study  - per ID was planned for Ertapenem x4 weeks per ID, c/w ertapenem (started 8/22, 4 wks 9/19/19)  - also would need repeat CT per ID ~9/16

## 2019-09-06 NOTE — PROVIDER CONTACT NOTE (OTHER) - RECOMMENDATIONS
Continue to monitor wound for worsening status. [facility] surgery team to evaluate patient at bedside. Continue IV abx.

## 2019-09-06 NOTE — PROGRESS NOTE ADULT - PROBLEM SELECTOR PLAN 5
Patient is Taoist, does not accept blood products. Accepts iron and Epogen per daughter   - No acute evidence of bleeding   - iron studies c/w AOCD likely in setting of infection/inflammation

## 2019-09-06 NOTE — PROGRESS NOTE ADULT - PROBLEM SELECTOR PLAN 1
Patient sent to ED for evaluation of increased work of breathing at [facility], etiology unclear although resolved at this time. CT C/A/P with no significant change from 8/30  - "Left lower lobe complete collapse again seen" - this may be due to atelectasis or mucoid impaction, however patient clinically does not have PNA as she is afebrile and has no cough. Will monitor off antibiotics (already on Ertapenem for abd abscess). Incentive spirometry.   -  no evidence of PE on CTA chest  -  SOB most likely 2/2 reactive airway disease; s/p solumedrol in ED, will not continue.  Continue with Spiriva, resume Symbicort (recently seen by pulm at [facility])  - c/w nebs TID  - currently no need for O2  - no s/s of CHF/fluid overload

## 2019-09-06 NOTE — PROGRESS NOTE ADULT - SUBJECTIVE AND OBJECTIVE BOX
Patient is a 74y old  Female who presents with a chief complaint of Lethargy and tachypnea    SUBJECTIVE / OVERNIGHT EVENTS:    No CP, SOB, f/c/n/v  Pain at wound during wound care  No foot pain, resolved from yesterday  NPO for drain check    MEDICATIONS  (STANDING):  ALBUTerol/ipratropium for Nebulization 3 milliLiter(s) Nebulizer every 8 hours  apixaban 5 milliGRAM(s) Oral every 12 hours  ascorbic acid 500 milliGRAM(s) Oral daily  atorvastatin 20 milliGRAM(s) Oral at bedtime  buDESOnide  80 MICROgram(s)/formoterol 4.5 MICROgram(s) Inhaler 2 Puff(s) Inhalation two times a day  chlorhexidine 4% Liquid 1 Application(s) Topical at bedtime  enalapril 5 milliGRAM(s) Oral two times a day  ertapenem  IVPB      ertapenem  IVPB 1000 milliGRAM(s) IV Intermittent every 24 hours  famotidine    Tablet 20 milliGRAM(s) Oral daily  ferrous    sulfate 325 milliGRAM(s) Oral daily  furosemide    Tablet 40 milliGRAM(s) Oral daily  insulin lispro (HumaLOG) corrective regimen sliding scale   SubCutaneous three times a day before meals  insulin lispro (HumaLOG) corrective regimen sliding scale   SubCutaneous at bedtime  loratadine 10 milliGRAM(s) Oral daily  magnesium oxide 400 milliGRAM(s) Oral three times a day with meals  montelukast 10 milliGRAM(s) Oral daily  spironolactone 25 milliGRAM(s) Oral daily  tiotropium 18 MICROgram(s) Capsule 1 Capsule(s) Inhalation daily    MEDICATIONS  (PRN):  dextrose 40% Gel 15 Gram(s) Oral once PRN Blood Glucose LESS THAN 70 milliGRAM(s)/deciliter  glucagon  Injectable 1 milliGRAM(s) IntraMuscular once PRN Glucose LESS THAN 70 milligrams/deciliter  guaiFENesin    Syrup 100 milliGRAM(s) Oral every 6 hours PRN Cough    T(C): 36.4 (09-06-19 @ 14:03), Max: 36.8 (09-05-19 @ 21:59)  HR: 98 (09-06-19 @ 14:14) (90 - 103)  BP: 115/47 (09-06-19 @ 14:03) (106/51 - 139/62)  RR: 18 (09-06-19 @ 14:03) (17 - 18)  SpO2: 95% (09-06-19 @ 14:14) (90% - 100%)    CAPILLARY BLOOD GLUCOSE  POCT Blood Glucose.: 109 mg/dL (06 Sep 2019 12:32)  POCT Blood Glucose.: 97 mg/dL (06 Sep 2019 07:49)  POCT Blood Glucose.: 96 mg/dL (06 Sep 2019 06:42)  POCT Blood Glucose.: 125 mg/dL (06 Sep 2019 01:04)  POCT Blood Glucose.: 83 mg/dL (05 Sep 2019 21:16)  POCT Blood Glucose.: 109 mg/dL (05 Sep 2019 17:05)    PHYSICAL EXAM:  GENERAL: NAD, mild increase WOB when speaking, on RA  CHEST/LUNG: Clear to auscultation bilaterally; low exp wheeze intermittently   HEART: Regular rate and rhythm; No murmurs, rubs, or gallops  ABDOMEN: + RLQ ostomy, abdominal dressing anteriorly two holes at base of wound that drain purulence likely lower one connected to L paracolic site as can visualize drain, L sided pigtail x 2 draining greyish/brown material   EXTREMITIES:   wwp, no edema   PSYCH: AAOx3  NEUROLOGY: non-focal  SKIN: abdominal wound (see wound care eval)     LABS:                        8.1    13.96 )-----------( 287      ( 06 Sep 2019 05:00 )             26.1     09-06    139  |  99  |  10  ----------------------------<  96  3.2<L>   |  27  |  0.58    Ca    8.7      06 Sep 2019 05:00  Mg     1.4     09-06      Microbiology: Culture Results:   No growth at 5 days. (08-30 @ 18:43)  Culture Results:   Few Escherichia coli  Rare Enterococcus faecalis  Rare Candida albicans "Susceptibilities not performed" (08-30 @ 18:42)  Culture Results:   No growth at 5 days. (08-30 @ 18:00)    Consultant(s) Notes Reviewed:    Care Discussed with Consultants/Other Providers: surgery team Patient is a 74y old  Female who presents with a chief complaint of Lethargy and tachypnea    SUBJECTIVE / OVERNIGHT EVENTS:    No CP, SOB, f/c/n/v  Pain at wound during wound care  No foot pain, resolved from yesterday  NPO for drain check    Seen with surgical resident at bedside to assess wound    MEDICATIONS  (STANDING):  ALBUTerol/ipratropium for Nebulization 3 milliLiter(s) Nebulizer every 8 hours  apixaban 5 milliGRAM(s) Oral every 12 hours  ascorbic acid 500 milliGRAM(s) Oral daily  atorvastatin 20 milliGRAM(s) Oral at bedtime  buDESOnide  80 MICROgram(s)/formoterol 4.5 MICROgram(s) Inhaler 2 Puff(s) Inhalation two times a day  chlorhexidine 4% Liquid 1 Application(s) Topical at bedtime  enalapril 5 milliGRAM(s) Oral two times a day  ertapenem  IVPB      ertapenem  IVPB 1000 milliGRAM(s) IV Intermittent every 24 hours  famotidine    Tablet 20 milliGRAM(s) Oral daily  ferrous    sulfate 325 milliGRAM(s) Oral daily  furosemide    Tablet 40 milliGRAM(s) Oral daily  insulin lispro (HumaLOG) corrective regimen sliding scale   SubCutaneous three times a day before meals  insulin lispro (HumaLOG) corrective regimen sliding scale   SubCutaneous at bedtime  loratadine 10 milliGRAM(s) Oral daily  magnesium oxide 400 milliGRAM(s) Oral three times a day with meals  montelukast 10 milliGRAM(s) Oral daily  spironolactone 25 milliGRAM(s) Oral daily  tiotropium 18 MICROgram(s) Capsule 1 Capsule(s) Inhalation daily    MEDICATIONS  (PRN):  dextrose 40% Gel 15 Gram(s) Oral once PRN Blood Glucose LESS THAN 70 milliGRAM(s)/deciliter  glucagon  Injectable 1 milliGRAM(s) IntraMuscular once PRN Glucose LESS THAN 70 milligrams/deciliter  guaiFENesin    Syrup 100 milliGRAM(s) Oral every 6 hours PRN Cough    T(C): 36.4 (09-06-19 @ 14:03), Max: 36.8 (09-05-19 @ 21:59)  HR: 98 (09-06-19 @ 14:14) (90 - 103)  BP: 115/47 (09-06-19 @ 14:03) (106/51 - 139/62)  RR: 18 (09-06-19 @ 14:03) (17 - 18)  SpO2: 95% (09-06-19 @ 14:14) (90% - 100%)    CAPILLARY BLOOD GLUCOSE  POCT Blood Glucose.: 109 mg/dL (06 Sep 2019 12:32)  POCT Blood Glucose.: 97 mg/dL (06 Sep 2019 07:49)  POCT Blood Glucose.: 96 mg/dL (06 Sep 2019 06:42)  POCT Blood Glucose.: 125 mg/dL (06 Sep 2019 01:04)  POCT Blood Glucose.: 83 mg/dL (05 Sep 2019 21:16)  POCT Blood Glucose.: 109 mg/dL (05 Sep 2019 17:05)    PHYSICAL EXAM:  GENERAL: NAD, mild increase WOB when speaking, on RA  CHEST/LUNG: Clear to auscultation bilaterally; low exp wheeze intermittently   HEART: Regular rate and rhythm; No murmurs, rubs, or gallops  ABDOMEN: + RLQ ostomy, abdominal dressing anteriorly two holes at base of wound that drain purulence likely lower one connected to L paracolic site as can visualize drain, L sided pigtail x 2 draining greyish/brown material   EXTREMITIES:   wwp, no edema   PSYCH: AAOx3  NEUROLOGY: non-focal  SKIN: abdominal wound (see wound care eval)     LABS:                        8.1    13.96 )-----------( 287      ( 06 Sep 2019 05:00 )             26.1     09-06    139  |  99  |  10  ----------------------------<  96  3.2<L>   |  27  |  0.58    Ca    8.7      06 Sep 2019 05:00  Mg     1.4     09-06      Microbiology: Culture Results:   No growth at 5 days. (08-30 @ 18:43)  Culture Results:   Few Escherichia coli  Rare Enterococcus faecalis  Rare Candida albicans "Susceptibilities not performed" (08-30 @ 18:42)  Culture Results:   No growth at 5 days. (08-30 @ 18:00)    Consultant(s) Notes Reviewed:    Care Discussed with Consultants/Other Providers: surgery team

## 2019-09-06 NOTE — PROVIDER CONTACT NOTE (OTHER) - ASSESSMENT
Patient noted to have abdominal wound present. JANAY Duong Cardi at bedside to evaluate. As per primary team, surgery to evaluate patient for further wound care recommendations. Wound packed with 4x4 gauze, and ABD pad at this time. No distress noted, copious amount of drainage noted from wound.

## 2019-09-06 NOTE — PROGRESS NOTE ADULT - PROBLEM SELECTOR PLAN 3
EF = 45%, does not appear to be in HF  - c/w Lasix 40 mg PO daily   - c/w spironolactone 25 mg daily  - c/w enalapril 5 mg BID  - has OP HF f/u with Dr. Hobson on 9/9 @ 432im

## 2019-09-07 LAB
ANION GAP SERPL CALC-SCNC: 12 MMO/L — SIGNIFICANT CHANGE UP (ref 7–14)
BUN SERPL-MCNC: 9 MG/DL — SIGNIFICANT CHANGE UP (ref 7–23)
CALCIUM SERPL-MCNC: 8.5 MG/DL — SIGNIFICANT CHANGE UP (ref 8.4–10.5)
CHLORIDE SERPL-SCNC: 99 MMOL/L — SIGNIFICANT CHANGE UP (ref 98–107)
CO2 SERPL-SCNC: 29 MMOL/L — SIGNIFICANT CHANGE UP (ref 22–31)
CREAT SERPL-MCNC: 0.55 MG/DL — SIGNIFICANT CHANGE UP (ref 0.5–1.3)
GLUCOSE SERPL-MCNC: 114 MG/DL — HIGH (ref 70–99)
HCT VFR BLD CALC: 26.5 % — LOW (ref 34.5–45)
HGB BLD-MCNC: 8.2 G/DL — LOW (ref 11.5–15.5)
MCHC RBC-ENTMCNC: 29.5 PG — SIGNIFICANT CHANGE UP (ref 27–34)
MCHC RBC-ENTMCNC: 30.9 % — LOW (ref 32–36)
MCV RBC AUTO: 95.3 FL — SIGNIFICANT CHANGE UP (ref 80–100)
NRBC # FLD: 0 K/UL — SIGNIFICANT CHANGE UP (ref 0–0)
PLATELET # BLD AUTO: 283 K/UL — SIGNIFICANT CHANGE UP (ref 150–400)
PMV BLD: 9 FL — SIGNIFICANT CHANGE UP (ref 7–13)
POTASSIUM SERPL-MCNC: 3.6 MMOL/L — SIGNIFICANT CHANGE UP (ref 3.5–5.3)
POTASSIUM SERPL-SCNC: 3.6 MMOL/L — SIGNIFICANT CHANGE UP (ref 3.5–5.3)
RBC # BLD: 2.78 M/UL — LOW (ref 3.8–5.2)
RBC # FLD: 21 % — HIGH (ref 10.3–14.5)
SODIUM SERPL-SCNC: 140 MMOL/L — SIGNIFICANT CHANGE UP (ref 135–145)
WBC # BLD: 13.5 K/UL — HIGH (ref 3.8–10.5)
WBC # FLD AUTO: 13.5 K/UL — HIGH (ref 3.8–10.5)

## 2019-09-07 PROCEDURE — 99232 SBSQ HOSP IP/OBS MODERATE 35: CPT

## 2019-09-07 RX ORDER — ACETAMINOPHEN 500 MG
650 TABLET ORAL EVERY 6 HOURS
Refills: 0 | Status: DISCONTINUED | OUTPATIENT
Start: 2019-09-07 | End: 2019-09-09

## 2019-09-07 RX ORDER — POTASSIUM CHLORIDE 20 MEQ
20 PACKET (EA) ORAL ONCE
Refills: 0 | Status: COMPLETED | OUTPATIENT
Start: 2019-09-07 | End: 2019-09-07

## 2019-09-07 RX ADMIN — LORATADINE 10 MILLIGRAM(S): 10 TABLET ORAL at 12:37

## 2019-09-07 RX ADMIN — Medication 3 MILLILITER(S): at 08:37

## 2019-09-07 RX ADMIN — SPIRONOLACTONE 25 MILLIGRAM(S): 25 TABLET, FILM COATED ORAL at 05:43

## 2019-09-07 RX ADMIN — MONTELUKAST 10 MILLIGRAM(S): 4 TABLET, CHEWABLE ORAL at 12:38

## 2019-09-07 RX ADMIN — Medication 5 MILLIGRAM(S): at 17:15

## 2019-09-07 RX ADMIN — Medication 650 MILLIGRAM(S): at 12:38

## 2019-09-07 RX ADMIN — Medication 40 MILLIGRAM(S): at 05:43

## 2019-09-07 RX ADMIN — Medication 20 MILLIEQUIVALENT(S): at 09:05

## 2019-09-07 RX ADMIN — APIXABAN 5 MILLIGRAM(S): 2.5 TABLET, FILM COATED ORAL at 05:43

## 2019-09-07 RX ADMIN — Medication 3 MILLILITER(S): at 15:42

## 2019-09-07 RX ADMIN — Medication 325 MILLIGRAM(S): at 12:37

## 2019-09-07 RX ADMIN — Medication 650 MILLIGRAM(S): at 13:08

## 2019-09-07 RX ADMIN — ATORVASTATIN CALCIUM 20 MILLIGRAM(S): 80 TABLET, FILM COATED ORAL at 22:40

## 2019-09-07 RX ADMIN — MAGNESIUM OXIDE 400 MG ORAL TABLET 400 MILLIGRAM(S): 241.3 TABLET ORAL at 09:06

## 2019-09-07 RX ADMIN — FAMOTIDINE 20 MILLIGRAM(S): 10 INJECTION INTRAVENOUS at 12:37

## 2019-09-07 RX ADMIN — CHLORHEXIDINE GLUCONATE 1 APPLICATION(S): 213 SOLUTION TOPICAL at 22:40

## 2019-09-07 RX ADMIN — MAGNESIUM OXIDE 400 MG ORAL TABLET 400 MILLIGRAM(S): 241.3 TABLET ORAL at 12:38

## 2019-09-07 RX ADMIN — Medication 3 MILLILITER(S): at 23:54

## 2019-09-07 RX ADMIN — APIXABAN 5 MILLIGRAM(S): 2.5 TABLET, FILM COATED ORAL at 17:16

## 2019-09-07 RX ADMIN — BUDESONIDE AND FORMOTEROL FUMARATE DIHYDRATE 2 PUFF(S): 160; 4.5 AEROSOL RESPIRATORY (INHALATION) at 22:40

## 2019-09-07 RX ADMIN — TIOTROPIUM BROMIDE 1 CAPSULE(S): 18 CAPSULE ORAL; RESPIRATORY (INHALATION) at 12:37

## 2019-09-07 RX ADMIN — Medication 500 MILLIGRAM(S): at 12:37

## 2019-09-07 RX ADMIN — MAGNESIUM OXIDE 400 MG ORAL TABLET 400 MILLIGRAM(S): 241.3 TABLET ORAL at 17:16

## 2019-09-07 RX ADMIN — BUDESONIDE AND FORMOTEROL FUMARATE DIHYDRATE 2 PUFF(S): 160; 4.5 AEROSOL RESPIRATORY (INHALATION) at 09:06

## 2019-09-07 RX ADMIN — Medication 5 MILLIGRAM(S): at 05:43

## 2019-09-07 RX ADMIN — ERTAPENEM SODIUM 120 MILLIGRAM(S): 1 INJECTION, POWDER, LYOPHILIZED, FOR SOLUTION INTRAMUSCULAR; INTRAVENOUS at 22:39

## 2019-09-07 NOTE — PROGRESS NOTE ADULT - SUBJECTIVE AND OBJECTIVE BOX
Patient is a 74y old  Female who presents with a chief complaint of Reactive airway, abdominal wound and collection    SUBJECTIVE / OVERNIGHT EVENTS:    Feels tired today, breathing overall better  no CP, no pain at abdominal wound currently     MEDICATIONS  (STANDING):  ALBUTerol/ipratropium for Nebulization 3 milliLiter(s) Nebulizer every 8 hours  apixaban 5 milliGRAM(s) Oral every 12 hours  ascorbic acid 500 milliGRAM(s) Oral daily  atorvastatin 20 milliGRAM(s) Oral at bedtime  buDESOnide  80 MICROgram(s)/formoterol 4.5 MICROgram(s) Inhaler 2 Puff(s) Inhalation two times a day  chlorhexidine 4% Liquid 1 Application(s) Topical at bedtime  enalapril 5 milliGRAM(s) Oral two times a day  ertapenem  IVPB 1000 milliGRAM(s) IV Intermittent every 24 hours  famotidine    Tablet 20 milliGRAM(s) Oral daily  ferrous    sulfate 325 milliGRAM(s) Oral daily  furosemide    Tablet 40 milliGRAM(s) Oral daily  loratadine 10 milliGRAM(s) Oral daily  magnesium oxide 400 milliGRAM(s) Oral three times a day with meals  montelukast 10 milliGRAM(s) Oral daily  spironolactone 25 milliGRAM(s) Oral daily  tiotropium 18 MICROgram(s) Capsule 1 Capsule(s) Inhalation daily    MEDICATIONS  (PRN):  acetaminophen   Tablet .. 650 milliGRAM(s) Oral every 6 hours PRN Moderate Pain (4 - 6)  glucagon  Injectable 1 milliGRAM(s) IntraMuscular once PRN Glucose LESS THAN 70 milligrams/deciliter  guaiFENesin    Syrup 100 milliGRAM(s) Oral every 6 hours PRN Cough    T(C): 36.6 (09-07-19 @ 14:15), Max: 36.7 (09-06-19 @ 19:59)  HR: 102 (09-07-19 @ 14:15) (97 - 107)  BP: 110/52 (09-07-19 @ 14:15) (106/54 - 114/53)  RR: 17 (09-07-19 @ 14:15) (17 - 18)  SpO2: 95% (09-07-19 @ 14:15) (95% - 97%)    CAPILLARY BLOOD GLUCOSE  POCT Blood Glucose.: 131 mg/dL (07 Sep 2019 12:00)  POCT Blood Glucose.: 142 mg/dL (07 Sep 2019 07:50)  POCT Blood Glucose.: 84 mg/dL (06 Sep 2019 20:53)  POCT Blood Glucose.: 100 mg/dL (06 Sep 2019 17:03)    PHYSICAL EXAM:  GENERAL: NAD, on RA  HEENT: edentulous   CHEST/LUNG: Clear to auscultation bilaterally  HEART: Regular rate and rhythm; No murmurs, rubs, or gallops  ABDOMEN: + RLQ ostomy, abdominal dressing anteriorly two holes at base of wound that drain purulence likely lower one connected to L paracolic site as can visualize drain, L sided pigtail x 2 draining greyish/brown material in tube (none in bag)  EXTREMITIES:   wwp, no edema   PSYCH: AAOx3  NEUROLOGY: non-focal  SKIN: abdominal wound (see wound care eval)     LABS:                        8.2    13.50 )-----------( 283      ( 07 Sep 2019 05:00 )             26.5     09-07    140  |  99  |  9   ----------------------------<  114<H>  3.6   |  29  |  0.55    Ca    8.5      07 Sep 2019 05:00  Mg     1.4     09-06    Consultant(s) Notes Reviewed:    Care Discussed with Consultants/Other Providers:

## 2019-09-07 NOTE — PROGRESS NOTE ADULT - PROBLEM SELECTOR PLAN 1
Patient sent to ED for evaluation of increased work of breathing at [facility], etiology unclear although resolved at this time. CT C/A/P with no significant change from 8/30  - "Left lower lobe complete collapse again seen" - this may be due to atelectasis or mucoid impaction, however patient clinically does not have PNA as she is afebrile and has no cough. Will monitor off antibiotics (already on Ertapenem for abd abscess). Incentive spirometry.   -  no evidence of PE on CTA chest  -  SOB most likely 2/2 reactive airway disease; s/p solumedrol in ED, will not continue.  Continue with Spiriva, resume Symbicort (recently seen by pulm at [facility])  - c/w nebs TID  - currently no need for O2  - no s/s of CHF/fluid overload

## 2019-09-07 NOTE — PROGRESS NOTE ADULT - PROBLEM SELECTOR PLAN 3
EF = 45%, does not appear to be in HF  - c/w Lasix 40 mg PO daily   - c/w spironolactone 25 mg daily  - c/w enalapril 5 mg BID  - has OP HF f/u with Dr. Hobson on 9/9 @ 910wm

## 2019-09-07 NOTE — PROGRESS NOTE ADULT - PROBLEM SELECTOR PLAN 5
Patient is Mu-ism, does not accept blood products. Accepts iron and Epogen per daughter   - No acute evidence of bleeding   - iron studies c/w AOCD likely in setting of infection/inflammation

## 2019-09-08 LAB
ANION GAP SERPL CALC-SCNC: 14 MMO/L — SIGNIFICANT CHANGE UP (ref 7–14)
BACTERIA BLD CULT: SIGNIFICANT CHANGE UP
BACTERIA BLD CULT: SIGNIFICANT CHANGE UP
BASOPHILS # BLD AUTO: 0.06 K/UL — SIGNIFICANT CHANGE UP (ref 0–0.2)
BASOPHILS NFR BLD AUTO: 0.4 % — SIGNIFICANT CHANGE UP (ref 0–2)
BUN SERPL-MCNC: 10 MG/DL — SIGNIFICANT CHANGE UP (ref 7–23)
CALCIUM SERPL-MCNC: 9 MG/DL — SIGNIFICANT CHANGE UP (ref 8.4–10.5)
CHLORIDE SERPL-SCNC: 99 MMOL/L — SIGNIFICANT CHANGE UP (ref 98–107)
CO2 SERPL-SCNC: 27 MMOL/L — SIGNIFICANT CHANGE UP (ref 22–31)
CREAT SERPL-MCNC: 0.6 MG/DL — SIGNIFICANT CHANGE UP (ref 0.5–1.3)
EOSINOPHIL # BLD AUTO: 0.16 K/UL — SIGNIFICANT CHANGE UP (ref 0–0.5)
EOSINOPHIL NFR BLD AUTO: 1.1 % — SIGNIFICANT CHANGE UP (ref 0–6)
GLUCOSE SERPL-MCNC: 91 MG/DL — SIGNIFICANT CHANGE UP (ref 70–99)
HCT VFR BLD CALC: 26.4 % — LOW (ref 34.5–45)
HGB BLD-MCNC: 8.1 G/DL — LOW (ref 11.5–15.5)
IMM GRANULOCYTES NFR BLD AUTO: 1.2 % — SIGNIFICANT CHANGE UP (ref 0–1.5)
LYMPHOCYTES # BLD AUTO: 1.7 K/UL — SIGNIFICANT CHANGE UP (ref 1–3.3)
LYMPHOCYTES # BLD AUTO: 11.7 % — LOW (ref 13–44)
MAGNESIUM SERPL-MCNC: 1.8 MG/DL — SIGNIFICANT CHANGE UP (ref 1.6–2.6)
MCHC RBC-ENTMCNC: 29.3 PG — SIGNIFICANT CHANGE UP (ref 27–34)
MCHC RBC-ENTMCNC: 30.7 % — LOW (ref 32–36)
MCV RBC AUTO: 95.7 FL — SIGNIFICANT CHANGE UP (ref 80–100)
MONOCYTES # BLD AUTO: 1.13 K/UL — HIGH (ref 0–0.9)
MONOCYTES NFR BLD AUTO: 7.8 % — SIGNIFICANT CHANGE UP (ref 2–14)
NEUTROPHILS # BLD AUTO: 11.27 K/UL — HIGH (ref 1.8–7.4)
NEUTROPHILS NFR BLD AUTO: 77.8 % — HIGH (ref 43–77)
NRBC # FLD: 0 K/UL — SIGNIFICANT CHANGE UP (ref 0–0)
PLATELET # BLD AUTO: 301 K/UL — SIGNIFICANT CHANGE UP (ref 150–400)
PMV BLD: 9.5 FL — SIGNIFICANT CHANGE UP (ref 7–13)
POTASSIUM SERPL-MCNC: 3.9 MMOL/L — SIGNIFICANT CHANGE UP (ref 3.5–5.3)
POTASSIUM SERPL-SCNC: 3.9 MMOL/L — SIGNIFICANT CHANGE UP (ref 3.5–5.3)
RBC # BLD: 2.76 M/UL — LOW (ref 3.8–5.2)
RBC # FLD: 21.2 % — HIGH (ref 10.3–14.5)
SODIUM SERPL-SCNC: 140 MMOL/L — SIGNIFICANT CHANGE UP (ref 135–145)
WBC # BLD: 14.49 K/UL — HIGH (ref 3.8–10.5)
WBC # FLD AUTO: 14.49 K/UL — HIGH (ref 3.8–10.5)

## 2019-09-08 PROCEDURE — 99232 SBSQ HOSP IP/OBS MODERATE 35: CPT

## 2019-09-08 RX ORDER — POTASSIUM CHLORIDE 20 MEQ
20 PACKET (EA) ORAL ONCE
Refills: 0 | Status: COMPLETED | OUTPATIENT
Start: 2019-09-08 | End: 2019-09-08

## 2019-09-08 RX ORDER — MAGNESIUM SULFATE 500 MG/ML
2 VIAL (ML) INJECTION ONCE
Refills: 0 | Status: COMPLETED | OUTPATIENT
Start: 2019-09-08 | End: 2019-09-08

## 2019-09-08 RX ADMIN — BUDESONIDE AND FORMOTEROL FUMARATE DIHYDRATE 2 PUFF(S): 160; 4.5 AEROSOL RESPIRATORY (INHALATION) at 09:41

## 2019-09-08 RX ADMIN — MAGNESIUM OXIDE 400 MG ORAL TABLET 400 MILLIGRAM(S): 241.3 TABLET ORAL at 09:40

## 2019-09-08 RX ADMIN — LORATADINE 10 MILLIGRAM(S): 10 TABLET ORAL at 14:32

## 2019-09-08 RX ADMIN — APIXABAN 5 MILLIGRAM(S): 2.5 TABLET, FILM COATED ORAL at 19:11

## 2019-09-08 RX ADMIN — MONTELUKAST 10 MILLIGRAM(S): 4 TABLET, CHEWABLE ORAL at 14:30

## 2019-09-08 RX ADMIN — MAGNESIUM OXIDE 400 MG ORAL TABLET 400 MILLIGRAM(S): 241.3 TABLET ORAL at 14:30

## 2019-09-08 RX ADMIN — Medication 325 MILLIGRAM(S): at 14:30

## 2019-09-08 RX ADMIN — ATORVASTATIN CALCIUM 20 MILLIGRAM(S): 80 TABLET, FILM COATED ORAL at 21:16

## 2019-09-08 RX ADMIN — FAMOTIDINE 20 MILLIGRAM(S): 10 INJECTION INTRAVENOUS at 14:31

## 2019-09-08 RX ADMIN — Medication 3 MILLILITER(S): at 07:28

## 2019-09-08 RX ADMIN — CHLORHEXIDINE GLUCONATE 1 APPLICATION(S): 213 SOLUTION TOPICAL at 21:17

## 2019-09-08 RX ADMIN — SPIRONOLACTONE 25 MILLIGRAM(S): 25 TABLET, FILM COATED ORAL at 06:04

## 2019-09-08 RX ADMIN — Medication 40 MILLIGRAM(S): at 06:04

## 2019-09-08 RX ADMIN — Medication 50 GRAM(S): at 09:40

## 2019-09-08 RX ADMIN — BUDESONIDE AND FORMOTEROL FUMARATE DIHYDRATE 2 PUFF(S): 160; 4.5 AEROSOL RESPIRATORY (INHALATION) at 21:16

## 2019-09-08 RX ADMIN — Medication 5 MILLIGRAM(S): at 19:11

## 2019-09-08 RX ADMIN — ERTAPENEM SODIUM 120 MILLIGRAM(S): 1 INJECTION, POWDER, LYOPHILIZED, FOR SOLUTION INTRAMUSCULAR; INTRAVENOUS at 22:59

## 2019-09-08 RX ADMIN — Medication 500 MILLIGRAM(S): at 14:32

## 2019-09-08 RX ADMIN — TIOTROPIUM BROMIDE 1 CAPSULE(S): 18 CAPSULE ORAL; RESPIRATORY (INHALATION) at 14:31

## 2019-09-08 RX ADMIN — APIXABAN 5 MILLIGRAM(S): 2.5 TABLET, FILM COATED ORAL at 06:04

## 2019-09-08 RX ADMIN — Medication 650 MILLIGRAM(S): at 06:03

## 2019-09-08 RX ADMIN — Medication 3 MILLILITER(S): at 15:55

## 2019-09-08 RX ADMIN — Medication 650 MILLIGRAM(S): at 07:03

## 2019-09-08 RX ADMIN — Medication 5 MILLIGRAM(S): at 06:04

## 2019-09-08 RX ADMIN — MAGNESIUM OXIDE 400 MG ORAL TABLET 400 MILLIGRAM(S): 241.3 TABLET ORAL at 19:12

## 2019-09-08 RX ADMIN — Medication 20 MILLIEQUIVALENT(S): at 09:40

## 2019-09-08 RX ADMIN — Medication 3 MILLILITER(S): at 22:11

## 2019-09-08 NOTE — PROGRESS NOTE ADULT - PROBLEM SELECTOR PLAN 1
Patient sent to ED for evaluation of increased work of breathing at [facility], etiology unclear although resolved at this time. CT C/A/P with no significant change from 8/30  - "Left lower lobe complete collapse again seen" - this may be due to atelectasis or mucoid impaction, however patient clinically does not have PNA as she is afebrile and has no cough. Will monitor off antibiotics (already on Ertapenem for abd abscess). Incentive spirometry.   -  no evidence of PE on CTA chest  -  SOB most likely 2/2 reactive airway disease; s/p solumedrol in ED, will not continue.  Continue with Spiriva, resume Symbicort (recently seen by pulm at [facility])  - c/w nebs TID  - currently no need for O2  - no s/s of CHF/fluid overload

## 2019-09-08 NOTE — PROGRESS NOTE ADULT - PROBLEM SELECTOR PLAN 2
s/p Lupe's for Hinchey IV perforated diverticulitis of the transverse colon on 7/2/19 with RTOR for multiple washouts and difficulty closing abdominal wall due to bowel edema and fascial retraction who then had strattice placed to close the abdomen with a vac over the strattice. Pt was recently admitted for new left lower ventral abdominal wall fluid collection with intraperitoneal communication at the level of the sigmoid colon and a left paracolic gutter collection s/p IR drainage of both collections (discharged to rehab with Invanz and PICC line).  - Wound care consult appreciated    - appreciate surgical eval  - IR drain study by IR on 9/6, rec flushing tubes 5 cc daily  - per ID was planned for Ertapenem x4 weeks per ID, c/w ertapenem (started 8/22, 4 wks 9/19/19)  - also would need repeat CT per ID ~9/16

## 2019-09-08 NOTE — PROGRESS NOTE ADULT - PROBLEM SELECTOR PLAN 5
Patient is Lutheran, does not accept blood products. Accepts iron and Epogen per daughter   - No acute evidence of bleeding   - iron studies c/w AOCD likely in setting of infection/inflammation

## 2019-09-08 NOTE — PROGRESS NOTE ADULT - SUBJECTIVE AND OBJECTIVE BOX
Patient is a 74y old  Female who presents with a chief complaint of Lethargy and tachypnea    SUBJECTIVE / OVERNIGHT EVENTS:    No CP, SOB, f/c/n/v  Feels tired    MEDICATIONS  (STANDING):  ALBUTerol/ipratropium for Nebulization 3 milliLiter(s) Nebulizer every 8 hours  apixaban 5 milliGRAM(s) Oral every 12 hours  ascorbic acid 500 milliGRAM(s) Oral daily  atorvastatin 20 milliGRAM(s) Oral at bedtime  buDESOnide  80 MICROgram(s)/formoterol 4.5 MICROgram(s) Inhaler 2 Puff(s) Inhalation two times a day  chlorhexidine 4% Liquid 1 Application(s) Topical at bedtime  enalapril 5 milliGRAM(s) Oral two times a day  ertapenem  IVPB 1000 milliGRAM(s) IV Intermittent every 24 hours  famotidine    Tablet 20 milliGRAM(s) Oral daily  ferrous    sulfate 325 milliGRAM(s) Oral daily  furosemide    Tablet 40 milliGRAM(s) Oral daily  loratadine 10 milliGRAM(s) Oral daily  magnesium oxide 400 milliGRAM(s) Oral three times a day with meals  montelukast 10 milliGRAM(s) Oral daily  spironolactone 25 milliGRAM(s) Oral daily  tiotropium 18 MICROgram(s) Capsule 1 Capsule(s) Inhalation daily    MEDICATIONS  (PRN):  acetaminophen   Tablet .. 650 milliGRAM(s) Oral every 6 hours PRN Moderate Pain (4 - 6)  glucagon  Injectable 1 milliGRAM(s) IntraMuscular once PRN Glucose LESS THAN 70 milligrams/deciliter  guaiFENesin    Syrup 100 milliGRAM(s) Oral every 6 hours PRN Cough    T(C): 36.6 (09-08-19 @ 06:02), Max: 36.6 (09-07-19 @ 20:54)  HR: 88 (09-08-19 @ 07:28) (88 - 102)  BP: 102/53 (09-08-19 @ 06:02) (102/53 - 113/59)  RR: 18 (09-08-19 @ 06:02) (18 - 18)  SpO2: 95% (09-08-19 @ 07:28) (94% - 98%)    CAPILLARY BLOOD GLUCOSE  POCT Blood Glucose.: 101 mg/dL (07 Sep 2019 22:38)  POCT Blood Glucose.: 130 mg/dL (07 Sep 2019 16:53)    PHYSICAL EXAM:  GENERAL: NAD, on RA  HEENT: edentulous   CHEST/LUNG: Clear to auscultation bilaterally  HEART: Regular rate and rhythm; No murmurs, rubs, or gallops  ABDOMEN: + RLQ ostomy, abdominal dressing anteriorly two holes at base of wound that drain purulence likely lower one connected to L paracolic site as can visualize drain, L sided pigtail x 2 draining greyish/brown material in tube (none in bag)  EXTREMITIES:   wwp, no edema   PSYCH: AAOx3  NEUROLOGY: non-focal  SKIN: abdominal wound (see wound care eval)     LABS:                        8.1    14.49 )-----------( 301      ( 08 Sep 2019 05:52 )             26.4     09-08    140  |  99  |  10  ----------------------------<  91  3.9   |  27  |  0.60    Ca    9.0      08 Sep 2019 05:52  Mg     1.8     09-08      Consultant(s) Notes Reviewed:    Care Discussed with Consultants/Other Providers:

## 2019-09-08 NOTE — PROGRESS NOTE ADULT - PROBLEM SELECTOR PLAN 3
EF = 45%, does not appear to be in HF  - c/w Lasix 40 mg PO daily   - c/w spironolactone 25 mg daily  - c/w enalapril 5 mg BID  - has OP HF f/u with Dr. Hobson on 9/9 @ 915am, will likely need to reschedule HF appt

## 2019-09-08 NOTE — PROGRESS NOTE ADULT - ASSESSMENT
73 yo with COPD/asthma, atrial fibrillation on ac, DM2, HFrEF s/p AICD, perforated diverticulitis s/p L hemicolectomy and end colostomy with poor wound closure and abdominal wound, L paracolic gutter collection s/p multiple return to OR and IR drains presenting from [facility] due to tachypnea/lethargy which has resolved.   Surgery recommending no intervention.

## 2019-09-09 ENCOUNTER — APPOINTMENT (OUTPATIENT)
Dept: CARDIOLOGY | Facility: CLINIC | Age: 74
End: 2019-09-09

## 2019-09-09 ENCOUNTER — TRANSCRIPTION ENCOUNTER (OUTPATIENT)
Age: 74
End: 2019-09-09

## 2019-09-09 VITALS
TEMPERATURE: 98 F | OXYGEN SATURATION: 96 % | RESPIRATION RATE: 18 BRPM | HEART RATE: 94 BPM | SYSTOLIC BLOOD PRESSURE: 124 MMHG | DIASTOLIC BLOOD PRESSURE: 62 MMHG

## 2019-09-09 LAB
ANION GAP SERPL CALC-SCNC: 18 MMO/L — HIGH (ref 7–14)
BASOPHILS # BLD AUTO: 0.08 K/UL — SIGNIFICANT CHANGE UP (ref 0–0.2)
BASOPHILS NFR BLD AUTO: 0.6 % — SIGNIFICANT CHANGE UP (ref 0–2)
BUN SERPL-MCNC: 10 MG/DL — SIGNIFICANT CHANGE UP (ref 7–23)
CALCIUM SERPL-MCNC: 8.9 MG/DL — SIGNIFICANT CHANGE UP (ref 8.4–10.5)
CHLORIDE SERPL-SCNC: 97 MMOL/L — LOW (ref 98–107)
CO2 SERPL-SCNC: 21 MMOL/L — LOW (ref 22–31)
CREAT SERPL-MCNC: 0.54 MG/DL — SIGNIFICANT CHANGE UP (ref 0.5–1.3)
EOSINOPHIL # BLD AUTO: 0.23 K/UL — SIGNIFICANT CHANGE UP (ref 0–0.5)
EOSINOPHIL NFR BLD AUTO: 1.6 % — SIGNIFICANT CHANGE UP (ref 0–6)
GLUCOSE SERPL-MCNC: 82 MG/DL — SIGNIFICANT CHANGE UP (ref 70–99)
HCT VFR BLD CALC: 31.6 % — LOW (ref 34.5–45)
HGB BLD-MCNC: 9.3 G/DL — LOW (ref 11.5–15.5)
IMM GRANULOCYTES NFR BLD AUTO: 1.3 % — SIGNIFICANT CHANGE UP (ref 0–1.5)
LYMPHOCYTES # BLD AUTO: 14.2 % — SIGNIFICANT CHANGE UP (ref 13–44)
LYMPHOCYTES # BLD AUTO: 2.03 K/UL — SIGNIFICANT CHANGE UP (ref 1–3.3)
MAGNESIUM SERPL-MCNC: 2.1 MG/DL — SIGNIFICANT CHANGE UP (ref 1.6–2.6)
MCHC RBC-ENTMCNC: 29.4 % — LOW (ref 32–36)
MCHC RBC-ENTMCNC: 29.4 PG — SIGNIFICANT CHANGE UP (ref 27–34)
MCV RBC AUTO: 100 FL — SIGNIFICANT CHANGE UP (ref 80–100)
MONOCYTES # BLD AUTO: 1.12 K/UL — HIGH (ref 0–0.9)
MONOCYTES NFR BLD AUTO: 7.9 % — SIGNIFICANT CHANGE UP (ref 2–14)
NEUTROPHILS # BLD AUTO: 10.61 K/UL — HIGH (ref 1.8–7.4)
NEUTROPHILS NFR BLD AUTO: 74.4 % — SIGNIFICANT CHANGE UP (ref 43–77)
NRBC # FLD: 0 K/UL — SIGNIFICANT CHANGE UP (ref 0–0)
PLATELET # BLD AUTO: 342 K/UL — SIGNIFICANT CHANGE UP (ref 150–400)
PMV BLD: 9.6 FL — SIGNIFICANT CHANGE UP (ref 7–13)
POTASSIUM SERPL-MCNC: 5.3 MMOL/L — SIGNIFICANT CHANGE UP (ref 3.5–5.3)
POTASSIUM SERPL-SCNC: 5.3 MMOL/L — SIGNIFICANT CHANGE UP (ref 3.5–5.3)
RBC # BLD: 3.16 M/UL — LOW (ref 3.8–5.2)
RBC # FLD: 21.3 % — HIGH (ref 10.3–14.5)
SODIUM SERPL-SCNC: 136 MMOL/L — SIGNIFICANT CHANGE UP (ref 135–145)
WBC # BLD: 14.26 K/UL — HIGH (ref 3.8–10.5)
WBC # FLD AUTO: 14.26 K/UL — HIGH (ref 3.8–10.5)

## 2019-09-09 PROCEDURE — 99239 HOSP IP/OBS DSCHRG MGMT >30: CPT

## 2019-09-09 RX ORDER — ASCORBIC ACID 60 MG
1 TABLET,CHEWABLE ORAL
Qty: 0 | Refills: 0 | DISCHARGE
Start: 2019-09-09

## 2019-09-09 RX ORDER — APIXABAN 2.5 MG/1
1 TABLET, FILM COATED ORAL
Qty: 0 | Refills: 0 | DISCHARGE
Start: 2019-09-09

## 2019-09-09 RX ORDER — BUDESONIDE AND FORMOTEROL FUMARATE DIHYDRATE 160; 4.5 UG/1; UG/1
2 AEROSOL RESPIRATORY (INHALATION)
Qty: 1 | Refills: 0
Start: 2019-09-09 | End: 2019-10-08

## 2019-09-09 RX ORDER — FAMOTIDINE 10 MG/ML
1 INJECTION INTRAVENOUS
Qty: 0 | Refills: 0 | DISCHARGE
Start: 2019-09-09

## 2019-09-09 RX ORDER — MAGNESIUM OXIDE 400 MG ORAL TABLET 241.3 MG
1 TABLET ORAL
Qty: 0 | Refills: 0 | DISCHARGE
Start: 2019-09-09

## 2019-09-09 RX ORDER — TIOTROPIUM BROMIDE 18 UG/1
1 CAPSULE ORAL; RESPIRATORY (INHALATION)
Qty: 0 | Refills: 0 | DISCHARGE
Start: 2019-09-09

## 2019-09-09 RX ORDER — ASCORBIC ACID 60 MG
0 TABLET,CHEWABLE ORAL
Qty: 0 | Refills: 0 | DISCHARGE

## 2019-09-09 RX ORDER — POTASSIUM CHLORIDE 20 MEQ
5 PACKET (EA) ORAL
Qty: 0 | Refills: 0 | DISCHARGE

## 2019-09-09 RX ORDER — FERROUS SULFATE 325(65) MG
1 TABLET ORAL
Qty: 30 | Refills: 0
Start: 2019-09-09 | End: 2019-10-08

## 2019-09-09 RX ORDER — FUROSEMIDE 40 MG
1 TABLET ORAL
Qty: 0 | Refills: 0 | DISCHARGE
Start: 2019-09-09

## 2019-09-09 RX ORDER — ERTAPENEM SODIUM 1 G/1
1 INJECTION, POWDER, LYOPHILIZED, FOR SOLUTION INTRAMUSCULAR; INTRAVENOUS
Qty: 1 | Refills: 0
Start: 2019-09-09 | End: 2019-09-18

## 2019-09-09 RX ORDER — MAGNESIUM OXIDE 400 MG ORAL TABLET 241.3 MG
1 TABLET ORAL
Qty: 0 | Refills: 0 | DISCHARGE

## 2019-09-09 RX ORDER — IPRATROPIUM/ALBUTEROL SULFATE 18-103MCG
3 AEROSOL WITH ADAPTER (GRAM) INHALATION
Qty: 0 | Refills: 0 | DISCHARGE

## 2019-09-09 RX ORDER — CHLORHEXIDINE GLUCONATE 213 G/1000ML
1 SOLUTION TOPICAL
Qty: 1 | Refills: 0
Start: 2019-09-09 | End: 2019-09-09

## 2019-09-09 RX ORDER — SPIRONOLACTONE 25 MG/1
1 TABLET, FILM COATED ORAL
Qty: 0 | Refills: 0 | DISCHARGE
Start: 2019-09-09

## 2019-09-09 RX ORDER — FAMOTIDINE 10 MG/ML
1 INJECTION INTRAVENOUS
Qty: 0 | Refills: 0 | DISCHARGE

## 2019-09-09 RX ADMIN — Medication 3 MILLILITER(S): at 08:25

## 2019-09-09 RX ADMIN — Medication 500 MILLIGRAM(S): at 12:31

## 2019-09-09 RX ADMIN — Medication 325 MILLIGRAM(S): at 12:31

## 2019-09-09 RX ADMIN — BUDESONIDE AND FORMOTEROL FUMARATE DIHYDRATE 2 PUFF(S): 160; 4.5 AEROSOL RESPIRATORY (INHALATION) at 10:15

## 2019-09-09 RX ADMIN — Medication 5 MILLIGRAM(S): at 05:33

## 2019-09-09 RX ADMIN — TIOTROPIUM BROMIDE 1 CAPSULE(S): 18 CAPSULE ORAL; RESPIRATORY (INHALATION) at 10:14

## 2019-09-09 RX ADMIN — APIXABAN 5 MILLIGRAM(S): 2.5 TABLET, FILM COATED ORAL at 05:32

## 2019-09-09 RX ADMIN — MAGNESIUM OXIDE 400 MG ORAL TABLET 400 MILLIGRAM(S): 241.3 TABLET ORAL at 10:15

## 2019-09-09 RX ADMIN — Medication 650 MILLIGRAM(S): at 12:31

## 2019-09-09 RX ADMIN — SPIRONOLACTONE 25 MILLIGRAM(S): 25 TABLET, FILM COATED ORAL at 05:34

## 2019-09-09 RX ADMIN — MONTELUKAST 10 MILLIGRAM(S): 4 TABLET, CHEWABLE ORAL at 12:30

## 2019-09-09 RX ADMIN — FAMOTIDINE 20 MILLIGRAM(S): 10 INJECTION INTRAVENOUS at 12:31

## 2019-09-09 RX ADMIN — LORATADINE 10 MILLIGRAM(S): 10 TABLET ORAL at 12:31

## 2019-09-09 RX ADMIN — Medication 650 MILLIGRAM(S): at 13:00

## 2019-09-09 RX ADMIN — MAGNESIUM OXIDE 400 MG ORAL TABLET 400 MILLIGRAM(S): 241.3 TABLET ORAL at 12:31

## 2019-09-09 RX ADMIN — Medication 40 MILLIGRAM(S): at 05:33

## 2019-09-09 RX ADMIN — Medication 3 MILLILITER(S): at 14:42

## 2019-09-09 NOTE — PROGRESS NOTE ADULT - SUBJECTIVE AND OBJECTIVE BOX
Patient is a 74y old  Female who presents with a chief complaint of Lethargy and tachypnea (09 Sep 2019 12:29)    SUBJECTIVE / OVERNIGHT EVENTS:  Patient seen complaining of a cough, no other complaints.     MEDICATIONS  (STANDING):  ALBUTerol/ipratropium for Nebulization 3 milliLiter(s) Nebulizer every 8 hours  apixaban 5 milliGRAM(s) Oral every 12 hours  ascorbic acid 500 milliGRAM(s) Oral daily  atorvastatin 20 milliGRAM(s) Oral at bedtime  buDESOnide  80 MICROgram(s)/formoterol 4.5 MICROgram(s) Inhaler 2 Puff(s) Inhalation two times a day  chlorhexidine 4% Liquid 1 Application(s) Topical at bedtime  enalapril 5 milliGRAM(s) Oral two times a day  ertapenem  IVPB      ertapenem  IVPB 1000 milliGRAM(s) IV Intermittent every 24 hours  famotidine    Tablet 20 milliGRAM(s) Oral daily  ferrous    sulfate 325 milliGRAM(s) Oral daily  furosemide    Tablet 40 milliGRAM(s) Oral daily  loratadine 10 milliGRAM(s) Oral daily  magnesium oxide 400 milliGRAM(s) Oral three times a day with meals  montelukast 10 milliGRAM(s) Oral daily  spironolactone 25 milliGRAM(s) Oral daily  tiotropium 18 MICROgram(s) Capsule 1 Capsule(s) Inhalation daily    MEDICATIONS  (PRN):  acetaminophen   Tablet .. 650 milliGRAM(s) Oral every 6 hours PRN Moderate Pain (4 - 6)  glucagon  Injectable 1 milliGRAM(s) IntraMuscular once PRN Glucose LESS THAN 70 milligrams/deciliter  guaiFENesin    Syrup 100 milliGRAM(s) Oral every 6 hours PRN Cough      Vital Signs Last 24 Hrs  T(C): 36.6 (09 Sep 2019 12:52), Max: 36.6 (08 Sep 2019 19:06)  T(F): 97.9 (09 Sep 2019 12:52), Max: 97.9 (08 Sep 2019 19:06)  HR: 107 (09 Sep 2019 12:52) (88 - 107)  BP: 110/51 (09 Sep 2019 12:52) (101/50 - 110/51)  BP(mean): --  RR: 18 (09 Sep 2019 12:52) (17 - 18)  SpO2: 94% (09 Sep 2019 12:52) (94% - 99%)  CAPILLARY BLOOD GLUCOSE        I&O's Summary      PHYSICAL EXAM:  GENERAL: NAD, on RA  HEENT: edentulous   CHEST/LUNG: Clear to auscultation bilaterally  HEART: Regular rate and rhythm; No murmurs, rubs, or gallops  ABDOMEN: + RLQ ostomy with black output, abdominal dressing anteriorly two holes at base of wound that drain purulence likely lower one connected to L paracolic site as can visualize drain, L sided pigtail x 2 draining greyish/brown material in tube (none in bag)  EXTREMITIES:  wwp, no edema   PSYCH: AAOx3  NEUROLOGY: non-focal  SKIN: abdominal wound (see wound care eval)       LABS:                        9.3    14.26 )-----------( 342      ( 09 Sep 2019 05:55 )             31.6     09-09    136  |  97<L>  |  10  ----------------------------<  82  5.3   |  21<L>  |  0.54    Ca    8.9      09 Sep 2019 05:55  Mg     2.1     09-09              RADIOLOGY & ADDITIONAL TESTS:    Imaging Personally Reviewed:  Consultant(s) Notes Reviewed:    Care Discussed with Consultants/Other Providers:

## 2019-09-09 NOTE — DISCHARGE NOTE NURSING/CASE MANAGEMENT/SOCIAL WORK - PATIENT PORTAL LINK FT
You can access the FollowMyHealth Patient Portal offered by [facility] by registering at the following website: [email or web address]. By joining [employer]’s FollowMyHealth portal, you will also be able to view your health information using other applications (apps) compatible with our system.

## 2019-09-09 NOTE — PROGRESS NOTE ADULT - PROBLEM SELECTOR PLAN 1
Patient sent to ED for evaluation of increased work of breathing at [facility], etiology unclear although resolved at this time. CT C/A/P with no significant change from 8/30  - "Left lower lobe complete collapse again seen" - this may be due to atelectasis or mucoid impaction, however patient clinically does not have PNA as she is afebrile and has no cough. Will monitor off antibiotics (already on Ertapenem for abd abscess). Incentive spirometry.   -  no evidence of PE on CTA chest  - SOB most likely 2/2 reactive airway disease; s/p solumedrol in ED, will not continue.    - Continue with Spiriva, resume Symbicort (recently seen by pulm at [facility])  - c/w nebs TID  - currently no need for O2  - no s/s of CHF/fluid overload

## 2019-09-09 NOTE — PROGRESS NOTE ADULT - ASSESSMENT
75 yo with COPD/asthma, atrial fibrillation on ac, DM2, HFrEF s/p AICD, perforated diverticulitis s/p L hemicolectomy and end colostomy with poor wound closure and abdominal wound, L paracolic gutter collection s/p multiple return to OR and IR drains presenting from [facility] due to tachypnea/lethargy which has resolved. Surgery recommending no intervention.

## 2019-09-09 NOTE — PROGRESS NOTE ADULT - PROBLEM SELECTOR PLAN 10
Diet: DASH/TLC  DVT ppx: Eliquis  Dispo: plan for dispo back to [facility] sent, needs auth  care d/w PA, SW/CM  dispo planning

## 2019-09-09 NOTE — PROGRESS NOTE ADULT - REASON FOR ADMISSION
Lethargy and tachypnea
Reactive airway, abdominal wound and collection
Lethargy and tachypnea

## 2019-09-09 NOTE — DISCHARGE NOTE PROVIDER - PROVIDER TOKENS
FREE:[LAST:[heart failure],PHONE:[(   )    -],FAX:[(   )    -],ADDRESS:[follow up with Dr Hobson from heart failure call for appointment]],FREE:[LAST:[surgery],PHONE:[(   )    -],FAX:[(   )    -],ADDRESS[facility]

## 2019-09-09 NOTE — DISCHARGE NOTE PROVIDER - NSFOLLOWUPCLINICS_GEN_ALL_ED_FT
[facility] Infectious Disease  HIV/AIDS Research & Treatment  [address] [place] [address]  Phone: [phone]  Fax:     [facility] Pulmonolgy and Sleep Medicine  Pulmonology  [address] [place] [id number]  Phone: [phone]  Fax:   Follow Up Time:

## 2019-09-09 NOTE — PROGRESS NOTE ADULT - PROBLEM SELECTOR PLAN 5
Patient is Muslim, does not accept blood products. Accepts iron and Epogen per daughter   - No acute evidence of bleeding   - iron studies c/w AOCD likely in setting of infection/inflammation

## 2019-09-09 NOTE — DISCHARGE NOTE PROVIDER - HOSPITAL COURSE
75 yo with COPD and asthma, atrial fibrillation on anticoagulation, DM2, HFrEF s/p AICD, perforated diverticulitis s/p L hemicolectomy and end colostomy with poor wound closure and abdominal wound, L paracolic gutter collection s/p multiple return to OR and IR drains presenting from [place] due to tachypnea/lethargy which has resolved.   Surgery recommending no intervention.          Shortness of breath.      Patient sent to ED for evaluation of increased work of breathing at [facility], etiology unclear although resolved at this time. CT chest, abdomen and pelvis with no significant change from 8/30    - "Left lower lobe complete collapse again seen" - this may be due to atelectasis or mucoid impaction, however patient clinically does not have PNA as she is afebrile and has no cough. Will monitor off antibiotics (already on Ertapenem for abdominal abscess). Incentive spirometry.     -  no evidence of PE on CTA chest    -  SOB most likely 2/2 reactive airway disease; s/p solumedrol in ED, will not continue.  Continue with Spiriva, resume Symbicort (recently seen by pulm at [facility])    - c/w nebs     - currently no need for O2             Perforated diverticulum of large intestine.      -s/p Lupe's for Hinchey IV perforated diverticulitis of the transverse colon on 7/2/19 with return to OR  for multiple washouts and difficulty closing abdominal wall due to bowel edema and fascial retraction who then had stratice placed to close the abdomen with a vac over the strattice. Pt was recently admitted for new left lower ventral abdominal wall fluid collection with intraperitoneal communication at the level of the sigmoid colon and a left paracolic gutter collection s/p IR drainage of both collections (discharged to rehab with Invanz and PICC line).    - Wound care consulted while in the hospital     - surgical evaluated pt while in the hospital     - IR drain study by IR on 9/6, rec flushing tubes 5 cc daily    - per ID was planned for Ertapenem x4 weeks per ID, c/w ertapenem (started 8/22, 4 wks 9/19/19)    - also would need repeat CT per ID ~9/16.         Chronic systolic heart failure.      EF = 45%, does not appear to be in HF    - c/w Lasix 40 mg PO daily     - c/w spironolactone 25 mg daily    - c/w enalapril 5 mg BID    - had OP HF f/u with Dr. Hobson on 9/9 will need to reschedule HF appt.         elevated white blood cell    No fevers, blood cx NG, likely due to steroid dose in ED    - blood culture no growth to date        Anemia.     Patient is Orthodoxy, does not accept blood products. Accepts iron and Epogen per daughter     - No acute evidence of bleeding     - iron studies c/w AOCD likely in setting of infection/inflammation.         Atrial flutter.     c/w on Eliquis 5mg PO BID    - Maintain K+ > 4.0 and Mg > 2.0.        Cardiac Pacemaker.     Interrogated by cardiology    - no events, normal functioning.         Type 2 diabetes mellitus.      BG at goal             Asthma.      c/w Duoneb    - c/w Symbicort.         Preventive measure.     Diet: DASH/TLC    DVT ppx: Eliquis 75 yo with COPD and asthma, atrial fibrillation on anticoagulation, DM2, HFrEF s/p AICD, perforated diverticulitis s/p L hemicolectomy and end colostomy with poor wound closure and abdominal wound, L paracolic gutter collection s/p multiple return to OR and IR drains presenting from [facility] due to tachypnea/lethargy which has resolved.   Surgery recommending no intervention.          Shortness of breath.      Patient sent to ED for evaluation of increased work of breathing at [facility], etiology unclear although resolved at this time. CT chest, abdomen and pelvis with no significant change from 8/30    - "Left lower lobe complete collapse again seen" - this may be due to atelectasis or mucoid impaction, however patient clinically does not have PNA as she is afebrile and has no cough. Will monitor off antibiotics (already on Ertapenem for abdominal abscess). Incentive spirometry.     -  no evidence of PE on CTA chest    -  SOB most likely 2/2 reactive airway disease; s/p solumedrol in ED, will not continue.  Continue with Spiriva, resume Symbicort (recently seen by pulm at [facility])    - c/w nebs     - currently no need for O2             Perforated diverticulum of large intestine.      -s/p Lupe's for Hinchey IV perforated diverticulitis of the transverse colon on 7/2/19 with return to OR  for multiple washouts and difficulty closing abdominal wall due to bowel edema and fascial retraction who then had stratice placed to close the abdomen with a vac over the strattice. Pt was recently admitted for new left lower ventral abdominal wall fluid collection with intraperitoneal communication at the level of the sigmoid colon and a left paracolic gutter collection s/p IR drainage of both collections (discharged to rehab with Invanz and PICC line).    - Wound care consulted while in the hospital     - surgical evaluated pt while in the hospital     - IR drain study by IR on 9/6, rec flushing tubes 5 cc daily    - per ID was planned for Ertapenem x4 weeks per ID, c/w ertapenem (started 8/22, 4 wks 9/19/19)    - also would need repeat CT per ID ~9/16.         Chronic systolic heart failure.      EF = 45%, does not appear to be in HF    - c/w Lasix 40 mg PO daily     - c/w spironolactone 25 mg daily    - c/w enalapril 5 mg BID    - had OP HF f/u with Dr. Hobson on 9/9 will need to reschedule HF appt.         elevated white blood cell    No fevers, blood cx NG, likely due to steroid dose in ED    - blood culture no growth to date        Anemia.     Patient is Roman Catholic, does not accept blood products. Accepts iron and Epogen per daughter     - No acute evidence of bleeding     - iron studies c/w AOCD likely in setting of infection/inflammation.         Atrial flutter.     c/w on Eliquis 5mg PO BID    - Maintain K+ > 4.0 and Mg > 2.0.        Cardiac Pacemaker.     Interrogated by cardiology    - no events, normal functioning.         Type 2 diabetes mellitus.      BG at goal             Asthma.      c/w Duoneb    - c/w Symbicort.         Preventive measure.     Diet: DASH/TLC    DVT ppx: Eliquis        Wound care:        Midline abdomen: Cleanse with NS, pat dry. Apply Liquid barrier film to periwound skin. Apply Aquacel AG hydrofiber to wound base, pack depth and satellite lesion communicating area with aquacel ag hydrofiber, cover with gauze, and abdominal pads and secure with paper tape. Change once a day.        Left lateral heel: Apply Liquid barrier film daily.        B/L breast, pannus: Place Interdry textile sheeting, remove to wash & dry affected area, then replace. Individual sheeting may be used for up to 5 days unless soiled.         MAD/IAD: Cleanse with skin cleanser, pat dry. Apply Nystatin powder, dust off excess and seal in with TRIAD paste. Twice a day.        Continue low air loss bed therapy, heel elevation, continue to turn & reposition q2h,  continue moisture management with barrier creams & single breathable pad, continue measures to decrease friction/shear/pressure.

## 2019-09-09 NOTE — DISCHARGE NOTE NURSING/CASE MANAGEMENT/SOCIAL WORK - NSDCPNINST_GEN_ALL_CORE
Midline abdomen: Cleanse with NS, pat dry. Apply Liquid barrier film to periwound skin. Apply Aquacel AG hydrofiber to wound base, pack depth and satellite lesion communicating area with aquacel ag hydrofiber, cover with gauze, and abdominal pads and secure with paper tape. Change once a day.    Left lateral heel: Apply Liquid barrier film daily.    B/L breast, pannus: Place Interdry textile sheeting, remove to wash & dry affected area, then replace. Individual sheeting may be used for up to 5 days unless soiled.

## 2019-09-09 NOTE — PROGRESS NOTE ADULT - PROBLEM SELECTOR PLAN 9
- c/w Duoneb  - c/w Symbicort
- c/w radha  - c/w symbicort
- c/w Duoneb  - c/w Symbicort
- c/w radha  - c/w symbicort
Richar c/w radha  - resume symbicort
Diet: DASH/TLC  DVT ppx: Eliquis  Dispo: [facility] assistance for placement to [facility].

## 2019-09-09 NOTE — DISCHARGE NOTE PROVIDER - NSDCCPCAREPLAN_GEN_ALL_CORE_FT
PRINCIPAL DISCHARGE DIAGNOSIS  Diagnosis: Shortness of breath  Assessment and Plan of Treatment: follow up with pulmonary call for appointment      SECONDARY DISCHARGE DIAGNOSES  Diagnosis: Type 2 diabetes mellitus  Assessment and Plan of Treatment: follow up with your primary care doctor regarding your recent hospital stay    Diagnosis: Cardiac Pacemaker  Assessment and Plan of Treatment: follow up with your cardiologist call for appointment    Diagnosis: Other elevated white blood cell (WBC) count  Assessment and Plan of Treatment: follow up with infectious disease call for apointment    Diagnosis: Chronic systolic heart failure  Assessment and Plan of Treatment: follow up with dr rosario call for appointment    Diagnosis: Perforated diverticulum of large intestine  Assessment and Plan of Treatment: follow up with your surgeon call for appointment

## 2019-09-09 NOTE — DISCHARGE NOTE PROVIDER - CARE PROVIDER_API CALL
heart failure,   follow up with Dr Hobson from heart failure call for appointment  Phone: (   )    -  Fax: (   )    -  Follow Up Time:     surgery,   follow up with [facility] call for appointment  Phone: (   )    -  Fax: (   )    -  Follow Up Time:

## 2019-09-12 ENCOUNTER — INPATIENT (INPATIENT)
Facility: HOSPITAL | Age: 74
LOS: 21 days | Discharge: NURSING, REHAB OR LONG-TERM CARE | DRG: 904 | End: 2019-10-04
Attending: SURGERY | Admitting: SURGERY
Payer: MEDICARE

## 2019-09-12 VITALS
SYSTOLIC BLOOD PRESSURE: 127 MMHG | RESPIRATION RATE: 16 BRPM | HEART RATE: 91 BPM | WEIGHT: 146.39 LBS | OXYGEN SATURATION: 91 % | DIASTOLIC BLOOD PRESSURE: 81 MMHG

## 2019-09-12 LAB
ALBUMIN SERPL ELPH-MCNC: 2.7 G/DL — LOW (ref 3.3–5)
ALP SERPL-CCNC: 126 U/L — HIGH (ref 40–120)
ALT FLD-CCNC: 7 U/L — LOW (ref 10–45)
ANION GAP SERPL CALC-SCNC: 17 MMOL/L — SIGNIFICANT CHANGE UP (ref 5–17)
AST SERPL-CCNC: 9 U/L — LOW (ref 10–40)
BASE EXCESS BLDV CALC-SCNC: 3.9 MMOL/L — HIGH (ref -2–2)
BASOPHILS # BLD AUTO: 0 K/UL — SIGNIFICANT CHANGE UP (ref 0–0.2)
BASOPHILS NFR BLD AUTO: 0 % — SIGNIFICANT CHANGE UP (ref 0–2)
BILIRUB SERPL-MCNC: 0.3 MG/DL — SIGNIFICANT CHANGE UP (ref 0.2–1.2)
BUN SERPL-MCNC: 18 MG/DL — SIGNIFICANT CHANGE UP (ref 7–23)
CA-I SERPL-SCNC: 1.15 MMOL/L — SIGNIFICANT CHANGE UP (ref 1.12–1.3)
CALCIUM SERPL-MCNC: 9.1 MG/DL — SIGNIFICANT CHANGE UP (ref 8.4–10.5)
CHLORIDE BLDV-SCNC: 99 MMOL/L — SIGNIFICANT CHANGE UP (ref 96–108)
CHLORIDE SERPL-SCNC: 94 MMOL/L — LOW (ref 96–108)
CO2 BLDV-SCNC: 30 MMOL/L — SIGNIFICANT CHANGE UP (ref 22–30)
CO2 SERPL-SCNC: 25 MMOL/L — SIGNIFICANT CHANGE UP (ref 22–31)
CREAT SERPL-MCNC: 0.68 MG/DL — SIGNIFICANT CHANGE UP (ref 0.5–1.3)
EOSINOPHIL # BLD AUTO: 0 K/UL — SIGNIFICANT CHANGE UP (ref 0–0.5)
EOSINOPHIL NFR BLD AUTO: 0.2 % — SIGNIFICANT CHANGE UP (ref 0–6)
GAS PNL BLDV: 136 MMOL/L — SIGNIFICANT CHANGE UP (ref 135–145)
GAS PNL BLDV: SIGNIFICANT CHANGE UP
GAS PNL BLDV: SIGNIFICANT CHANGE UP
GLUCOSE BLDV-MCNC: 81 MG/DL — SIGNIFICANT CHANGE UP (ref 70–99)
GLUCOSE SERPL-MCNC: 87 MG/DL — SIGNIFICANT CHANGE UP (ref 70–99)
HCO3 BLDV-SCNC: 28 MMOL/L — SIGNIFICANT CHANGE UP (ref 21–29)
HCT VFR BLD CALC: 28.1 % — LOW (ref 34.5–45)
HCT VFR BLDA CALC: 26 % — LOW (ref 39–50)
HGB BLD CALC-MCNC: 8.4 G/DL — LOW (ref 11.5–15.5)
HGB BLD-MCNC: 8.8 G/DL — LOW (ref 11.5–15.5)
LACTATE BLDV-MCNC: 1.2 MMOL/L — SIGNIFICANT CHANGE UP (ref 0.7–2)
LYMPHOCYTES # BLD AUTO: 1.8 K/UL — SIGNIFICANT CHANGE UP (ref 1–3.3)
LYMPHOCYTES # BLD AUTO: 14.8 % — SIGNIFICANT CHANGE UP (ref 13–44)
MCHC RBC-ENTMCNC: 31 PG — SIGNIFICANT CHANGE UP (ref 27–34)
MCHC RBC-ENTMCNC: 31.2 GM/DL — LOW (ref 32–36)
MCV RBC AUTO: 99.2 FL — SIGNIFICANT CHANGE UP (ref 80–100)
MONOCYTES # BLD AUTO: 1.2 K/UL — HIGH (ref 0–0.9)
MONOCYTES NFR BLD AUTO: 9.7 % — SIGNIFICANT CHANGE UP (ref 2–14)
NEUTROPHILS # BLD AUTO: 9.2 K/UL — HIGH (ref 1.8–7.4)
NEUTROPHILS NFR BLD AUTO: 75.3 % — SIGNIFICANT CHANGE UP (ref 43–77)
PCO2 BLDV: 44 MMHG — SIGNIFICANT CHANGE UP (ref 35–50)
PH BLDV: 7.42 — SIGNIFICANT CHANGE UP (ref 7.35–7.45)
PLATELET # BLD AUTO: 303 K/UL — SIGNIFICANT CHANGE UP (ref 150–400)
PO2 BLDV: 36 MMHG — SIGNIFICANT CHANGE UP (ref 25–45)
POTASSIUM BLDV-SCNC: 3.9 MMOL/L — SIGNIFICANT CHANGE UP (ref 3.5–5.3)
POTASSIUM SERPL-MCNC: 4.1 MMOL/L — SIGNIFICANT CHANGE UP (ref 3.5–5.3)
POTASSIUM SERPL-SCNC: 4.1 MMOL/L — SIGNIFICANT CHANGE UP (ref 3.5–5.3)
PROT SERPL-MCNC: 6.7 G/DL — SIGNIFICANT CHANGE UP (ref 6–8.3)
RBC # BLD: 2.83 M/UL — LOW (ref 3.8–5.2)
RBC # FLD: 19.3 % — HIGH (ref 10.3–14.5)
SAO2 % BLDV: 59 % — LOW (ref 67–88)
SODIUM SERPL-SCNC: 136 MMOL/L — SIGNIFICANT CHANGE UP (ref 135–145)
WBC # BLD: 12.3 K/UL — HIGH (ref 3.8–10.5)
WBC # FLD AUTO: 12.3 K/UL — HIGH (ref 3.8–10.5)

## 2019-09-12 PROCEDURE — 74177 CT ABD & PELVIS W/CONTRAST: CPT | Mod: 26

## 2019-09-12 PROCEDURE — 99285 EMERGENCY DEPT VISIT HI MDM: CPT

## 2019-09-12 PROCEDURE — 71045 X-RAY EXAM CHEST 1 VIEW: CPT | Mod: 26

## 2019-09-12 RX ORDER — SODIUM CHLORIDE 9 MG/ML
1000 INJECTION INTRAMUSCULAR; INTRAVENOUS; SUBCUTANEOUS
Refills: 0 | Status: DISCONTINUED | OUTPATIENT
Start: 2019-09-12 | End: 2019-09-13

## 2019-09-12 RX ORDER — HYDROCORTISONE 20 MG
200 TABLET ORAL ONCE
Refills: 0 | Status: COMPLETED | OUTPATIENT
Start: 2019-09-12 | End: 2019-09-12

## 2019-09-12 RX ORDER — DIPHENHYDRAMINE HCL 50 MG
50 CAPSULE ORAL ONCE
Refills: 0 | Status: COMPLETED | OUTPATIENT
Start: 2019-09-12 | End: 2019-09-12

## 2019-09-12 RX ORDER — ACETAMINOPHEN 500 MG
975 TABLET ORAL ONCE
Refills: 0 | Status: COMPLETED | OUTPATIENT
Start: 2019-09-12 | End: 2019-09-12

## 2019-09-12 RX ADMIN — Medication 50 MILLIGRAM(S): at 22:35

## 2019-09-12 RX ADMIN — Medication 200 MILLIGRAM(S): at 18:39

## 2019-09-12 RX ADMIN — SODIUM CHLORIDE 100 MILLILITER(S): 9 INJECTION INTRAMUSCULAR; INTRAVENOUS; SUBCUTANEOUS at 18:39

## 2019-09-12 RX ADMIN — Medication 975 MILLIGRAM(S): at 17:23

## 2019-09-12 NOTE — ED PROVIDER NOTE - OBJECTIVE STATEMENT
75 yo F hx COPD and asthma, AF on AC, DM2, HFrEF s/p AICD, perforated diverticulitis s/p L hemicolectomy and end colostomy with poor wound closure and abdominal wound, L paracolic gutter collection s/p multiple returns to OR, continued abdominal abscesses with 2 IR drains, DC'd from [facility] two days ago with PICC on ertapenem, now presenting from [facility] for IR drain that fell out and continued abdominal pain. Endorses new purulent drainage from anterior wound, pus drainage from IR drain and from area where drain fell out. Patient denies fevers/chills. No cp/sob. Stooling normally.

## 2019-09-12 NOTE — ED ADULT NURSE NOTE - OBJECTIVE STATEMENT
73 y/o female hx asthma, afib, DM, AICD, CHF presents to the ED via EMS from rehab c/o increased drainage and possible dislodgement of one drain. Pt states. requires drainage for multiple abdominal washouts and difficulty closing abdomen. Daughter states one of 2 of her drains the sutures have come out, endorsing diffuse abd pain, dec PO intake. Denies fever, chills, n/v, weakness, diarrhea/constipation, numbness/tingling, urinary s/s. Pt A&Ox3, in no respiratory distress, no CP, abd soft, tender to palpitation, drains in place. PT safety maintained with family at bedside, call bell within reach and bed in the lowest position.

## 2019-09-12 NOTE — ED ADULT NURSE NOTE - NSIMPLEMENTINTERV_GEN_ALL_ED
Implemented All Fall with Harm Risk Interventions:  [place] to call system. Call bell, personal items and telephone within reach. Instruct patient to call for assistance. Room bathroom lighting operational. Non-slip footwear when patient is off stretcher. Physically safe environment: no spills, clutter or unnecessary equipment. Stretcher in lowest position, wheels locked, appropriate side rails in place. Provide visual cue, wrist band, yellow gown, etc. Monitor gait and stability. Monitor for mental status changes and reorient to person, place, and time. Review medications for side effects contributing to fall risk. Reinforce activity limits and safety measures with patient and family. Provide visual clues: red socks.

## 2019-09-12 NOTE — ED PROVIDER NOTE - CHIEF COMPLAINT
The patient is a 74y Female complaining of The patient is a 74y Female complaining of abdominal pain

## 2019-09-12 NOTE — ED CLERICAL - NS ED CLERK NOTE PRE-ARRIVAL INFORMATION; ADDITIONAL PRE-ARRIVAL INFORMATION
CC/Reason For referral:  PRIOR ABDOMINAL SURGERY FOR PERFORATED DIVERTICULITIS.  DRAIN DISLODGED, PAINFUL AND THERE IS DISCHARGE  Preferred Consultant(if applicable):  DR LAURENT  Who admits for you (if needed):  Do you have documents you would like to fax over? NO  Would you still like to speak to an ED attending?  PLEASE CALL AFTER PATIENT IS SEEN

## 2019-09-12 NOTE — ED ADULT NURSE NOTE - PMH
Asthma    Atrial flutter    Cardiac Pacemaker    Cardiomyopathy    Congestive Heart Failure    Diabetes    Diverticulitis    Gout    HTN - Hypertension    Kidney stone    Patient is Amish    Refusal of blood transfusions as patient is Amish    Vertigo

## 2019-09-12 NOTE — ED PROVIDER NOTE - PROGRESS NOTE DETAILS
Attending MD Nolan.  Pt signed out to me in stable condition pending CT read, surgery cx, has hx of perf divertic, complic by need for recur washouts/drains, here from [facility] for one drain, stitches out, well appearing, needs tube check, if surg clears admit to medicine for drain replacement. Attending MD Nolan.  Pt stable for admission for drain replacement.

## 2019-09-12 NOTE — ED ADULT NURSE REASSESSMENT NOTE - NS ED NURSE REASSESS COMMENT FT1
Pt has PICC line in place- advised Dr. Alberts and Dr. Reed that chest xray is needed to confirm placement. Awaiting chest xray to draw blood and administer labs.

## 2019-09-12 NOTE — ED ADULT NURSE REASSESSMENT NOTE - NS ED NURSE REASSESS COMMENT FT1
Received report from ED RN Lyssa; patient A&ox3, afebrile- patient spo2 in 70s on RA, placed on 3 L NC at got 92% SPO2. Abdominal wound assessed and bandaged changed. PICC line patent and site WNL. Daughter at the bedside. Patient pending lab results.

## 2019-09-12 NOTE — ED PROVIDER NOTE - PSH
AICD (Automatic Cardioverter/Defibrillator) Present  inserted in Aug, 2008. Due for battery change in 1 month. ( [employer]) . Inserted by Dr Duffy  S/P cholecystectomy    Status post left hemicolectomy

## 2019-09-12 NOTE — ED PROVIDER NOTE - PHYSICAL EXAMINATION
VITALS: reviewed  GEN: NAD, A & O x 4  HEAD/EYES: NCAT, EOMI, anicteric sclerae, no conjunctival pallor  ENT: mucus membranes dry, oropharynx WNL, trachea midline  RESP: lungs CTA with equal breath sounds bilaterally, chest wall nontender and atraumatic  CV: heart with reg rhythm S1, S2, no murmur; distal pulses intact and symmetric bilaterally  ABDOMEN: diffusely ttp, purulent drainage from inferior aspect of anterior wound, purulent drainage into IR drain  : no CVAT  MSK: extremities atraumatic and nontender, no edema, no asymmetry.   SKIN: warm, dry, no rash, no bruising, no cyanosis. color appropriate for ethnicity  NEURO: alert, mentating appropriately, no facial asymmetry.   PSYCH: Affect appropriate

## 2019-09-12 NOTE — ED PROVIDER NOTE - PMH
Asthma    Atrial flutter    Cardiac Pacemaker    Cardiomyopathy    Congestive Heart Failure    Diabetes    Diverticulitis    Gout    HTN - Hypertension    Kidney stone    Patient is Anglican    Refusal of blood transfusions as patient is Anglican    Vertigo

## 2019-09-12 NOTE — ED PROVIDER NOTE - CLINICAL SUMMARY MEDICAL DECISION MAKING FREE TEXT BOX
ARNALDO Alberts MD: Pt is a 75 y/o female with PMH asthma, A fib, dm, HFrEF, pacemake/ AICD, perforated diverticulitis s/p L hemicolectomy and end colostomy with complicated course in 7/2019 requiring multiple abdominal washouts and difficulty closing abdomen for which strattice mesh was placed, s/p drains for wound drainage, who is sent in from rehab for abd pain, increased drainage from abdominal mesh wound and possible dislodgement of one of her abdominal drains. Per daughter, one of her 2 abd drains has sutures which have come out. Pt c/o abd pain. Per daughter, PO intake has been poor. Denies f/c. Denies CP, SOB, HA, dizziness. Ddx includes, however, is not limited to: dislodged abd drain, increased size of abdominal abscess/wound, intraabdominal infection, other. Plan: CTAP (pt will require premedication 2/2 IV contrast allergy), Surgery consult, bcx, vbg, basic labs, pain control, likely TBA

## 2019-09-12 NOTE — ED ADULT NURSE NOTE - CHPI ED NUR SYMPTOMS NEG
no burning urination/no chills/no blood in stool/no fever/no nausea/no dysuria/no hematuria/no vomiting/no abdominal distension/no diarrhea

## 2019-09-13 ENCOUNTER — TRANSCRIPTION ENCOUNTER (OUTPATIENT)
Age: 74
End: 2019-09-13

## 2019-09-13 DIAGNOSIS — N90.7 VULVAR CYST: ICD-10-CM

## 2019-09-13 DIAGNOSIS — A63.0 ANOGENITAL (VENEREAL) WARTS: ICD-10-CM

## 2019-09-13 DIAGNOSIS — Z90.49 ACQUIRED ABSENCE OF OTHER SPECIFIED PARTS OF DIGESTIVE TRACT: Chronic | ICD-10-CM

## 2019-09-13 DIAGNOSIS — T14.8XXA OTHER INJURY OF UNSPECIFIED BODY REGION, INITIAL ENCOUNTER: ICD-10-CM

## 2019-09-13 LAB
ANION GAP SERPL CALC-SCNC: 14 MMOL/L — SIGNIFICANT CHANGE UP (ref 5–17)
APTT BLD: 35 SEC — SIGNIFICANT CHANGE UP (ref 27.5–36.3)
APTT BLD: 35.7 SEC — SIGNIFICANT CHANGE UP (ref 27.5–36.3)
BUN SERPL-MCNC: 18 MG/DL — SIGNIFICANT CHANGE UP (ref 7–23)
CALCIUM SERPL-MCNC: 8.1 MG/DL — LOW (ref 8.4–10.5)
CHLORIDE SERPL-SCNC: 96 MMOL/L — SIGNIFICANT CHANGE UP (ref 96–108)
CO2 SERPL-SCNC: 22 MMOL/L — SIGNIFICANT CHANGE UP (ref 22–31)
CREAT SERPL-MCNC: 0.7 MG/DL — SIGNIFICANT CHANGE UP (ref 0.5–1.3)
GLUCOSE SERPL-MCNC: 190 MG/DL — HIGH (ref 70–99)
HCT VFR BLD CALC: 23.8 % — LOW (ref 34.5–45)
HGB BLD-MCNC: 7.2 G/DL — LOW (ref 11.5–15.5)
INR BLD: 1.54 RATIO — HIGH (ref 0.88–1.16)
INR BLD: 1.55 RATIO — HIGH (ref 0.88–1.16)
MAGNESIUM SERPL-MCNC: 2 MG/DL — SIGNIFICANT CHANGE UP (ref 1.6–2.6)
MCHC RBC-ENTMCNC: 29.6 PG — SIGNIFICANT CHANGE UP (ref 27–34)
MCHC RBC-ENTMCNC: 30.3 GM/DL — LOW (ref 32–36)
MCV RBC AUTO: 97.9 FL — SIGNIFICANT CHANGE UP (ref 80–100)
PHOSPHATE SERPL-MCNC: 3.6 MG/DL — SIGNIFICANT CHANGE UP (ref 2.5–4.5)
PLATELET # BLD AUTO: 253 K/UL — SIGNIFICANT CHANGE UP (ref 150–400)
POTASSIUM SERPL-MCNC: 4.2 MMOL/L — SIGNIFICANT CHANGE UP (ref 3.5–5.3)
POTASSIUM SERPL-SCNC: 4.2 MMOL/L — SIGNIFICANT CHANGE UP (ref 3.5–5.3)
PROTHROM AB SERPL-ACNC: 17.9 SEC — HIGH (ref 10–12.9)
PROTHROM AB SERPL-ACNC: 17.9 SEC — HIGH (ref 10–13.1)
RBC # BLD: 2.43 M/UL — LOW (ref 3.8–5.2)
RBC # FLD: 21.2 % — HIGH (ref 10.3–14.5)
SODIUM SERPL-SCNC: 132 MMOL/L — LOW (ref 135–145)
WBC # BLD: 8.51 K/UL — SIGNIFICANT CHANGE UP (ref 3.8–10.5)
WBC # FLD AUTO: 8.51 K/UL — SIGNIFICANT CHANGE UP (ref 3.8–10.5)

## 2019-09-13 PROCEDURE — 75984 XRAY CONTROL CATHETER CHANGE: CPT | Mod: 26

## 2019-09-13 PROCEDURE — 49423 EXCHANGE DRAINAGE CATHETER: CPT

## 2019-09-13 PROCEDURE — 99024 POSTOP FOLLOW-UP VISIT: CPT

## 2019-09-13 PROCEDURE — 71045 X-RAY EXAM CHEST 1 VIEW: CPT | Mod: 26

## 2019-09-13 PROCEDURE — 99223 1ST HOSP IP/OBS HIGH 75: CPT

## 2019-09-13 RX ORDER — DEXTROSE 50 % IN WATER 50 %
15 SYRINGE (ML) INTRAVENOUS ONCE
Refills: 0 | Status: DISCONTINUED | OUTPATIENT
Start: 2019-09-13 | End: 2019-09-13

## 2019-09-13 RX ORDER — INSULIN LISPRO 100/ML
VIAL (ML) SUBCUTANEOUS
Refills: 0 | Status: DISCONTINUED | OUTPATIENT
Start: 2019-09-13 | End: 2019-09-23

## 2019-09-13 RX ORDER — TIOTROPIUM BROMIDE 18 UG/1
1 CAPSULE ORAL; RESPIRATORY (INHALATION) DAILY
Refills: 0 | Status: DISCONTINUED | OUTPATIENT
Start: 2019-09-13 | End: 2019-10-04

## 2019-09-13 RX ORDER — INSULIN LISPRO 100/ML
VIAL (ML) SUBCUTANEOUS EVERY 6 HOURS
Refills: 0 | Status: DISCONTINUED | OUTPATIENT
Start: 2019-09-13 | End: 2019-09-13

## 2019-09-13 RX ORDER — DEXTROSE 50 % IN WATER 50 %
25 SYRINGE (ML) INTRAVENOUS ONCE
Refills: 0 | Status: DISCONTINUED | OUTPATIENT
Start: 2019-09-13 | End: 2019-09-13

## 2019-09-13 RX ORDER — FAMOTIDINE 10 MG/ML
20 INJECTION INTRAVENOUS DAILY
Refills: 0 | Status: DISCONTINUED | OUTPATIENT
Start: 2019-09-13 | End: 2019-10-04

## 2019-09-13 RX ORDER — SODIUM CHLORIDE 9 MG/ML
1000 INJECTION, SOLUTION INTRAVENOUS
Refills: 0 | Status: DISCONTINUED | OUTPATIENT
Start: 2019-09-13 | End: 2019-09-13

## 2019-09-13 RX ORDER — DEXTROSE 50 % IN WATER 50 %
25 SYRINGE (ML) INTRAVENOUS ONCE
Refills: 0 | Status: DISCONTINUED | OUTPATIENT
Start: 2019-09-13 | End: 2019-10-04

## 2019-09-13 RX ORDER — GLUCAGON INJECTION, SOLUTION 0.5 MG/.1ML
1 INJECTION, SOLUTION SUBCUTANEOUS ONCE
Refills: 0 | Status: DISCONTINUED | OUTPATIENT
Start: 2019-09-13 | End: 2019-10-04

## 2019-09-13 RX ORDER — DEXTROSE 50 % IN WATER 50 %
15 SYRINGE (ML) INTRAVENOUS ONCE
Refills: 0 | Status: DISCONTINUED | OUTPATIENT
Start: 2019-09-13 | End: 2019-10-04

## 2019-09-13 RX ORDER — NYSTATIN CREAM 100000 [USP'U]/G
1 CREAM TOPICAL
Refills: 0 | Status: DISCONTINUED | OUTPATIENT
Start: 2019-09-13 | End: 2019-10-04

## 2019-09-13 RX ORDER — ERTAPENEM SODIUM 1 G/1
1000 INJECTION, POWDER, LYOPHILIZED, FOR SOLUTION INTRAMUSCULAR; INTRAVENOUS EVERY 24 HOURS
Refills: 0 | Status: DISCONTINUED | OUTPATIENT
Start: 2019-09-13 | End: 2019-09-18

## 2019-09-13 RX ORDER — INSULIN LISPRO 100/ML
VIAL (ML) SUBCUTANEOUS AT BEDTIME
Refills: 0 | Status: DISCONTINUED | OUTPATIENT
Start: 2019-09-13 | End: 2019-09-23

## 2019-09-13 RX ORDER — FUROSEMIDE 40 MG
40 TABLET ORAL DAILY
Refills: 0 | Status: DISCONTINUED | OUTPATIENT
Start: 2019-09-13 | End: 2019-10-04

## 2019-09-13 RX ORDER — SODIUM CHLORIDE 9 MG/ML
500 INJECTION, SOLUTION INTRAVENOUS ONCE
Refills: 0 | Status: COMPLETED | OUTPATIENT
Start: 2019-09-13 | End: 2019-09-13

## 2019-09-13 RX ORDER — DIPHENHYDRAMINE HCL 50 MG
50 CAPSULE ORAL ONCE
Refills: 0 | Status: COMPLETED | OUTPATIENT
Start: 2019-09-13 | End: 2019-09-13

## 2019-09-13 RX ORDER — SPIRONOLACTONE 25 MG/1
25 TABLET, FILM COATED ORAL DAILY
Refills: 0 | Status: DISCONTINUED | OUTPATIENT
Start: 2019-09-13 | End: 2019-10-04

## 2019-09-13 RX ORDER — DEXTROSE 50 % IN WATER 50 %
12.5 SYRINGE (ML) INTRAVENOUS ONCE
Refills: 0 | Status: COMPLETED | OUTPATIENT
Start: 2019-09-13 | End: 2019-09-13

## 2019-09-13 RX ORDER — GLUCAGON INJECTION, SOLUTION 0.5 MG/.1ML
1 INJECTION, SOLUTION SUBCUTANEOUS ONCE
Refills: 0 | Status: DISCONTINUED | OUTPATIENT
Start: 2019-09-13 | End: 2019-09-13

## 2019-09-13 RX ORDER — DEXTROSE 50 % IN WATER 50 %
12.5 SYRINGE (ML) INTRAVENOUS ONCE
Refills: 0 | Status: DISCONTINUED | OUTPATIENT
Start: 2019-09-13 | End: 2019-10-04

## 2019-09-13 RX ORDER — SODIUM CHLORIDE 9 MG/ML
1000 INJECTION INTRAMUSCULAR; INTRAVENOUS; SUBCUTANEOUS
Refills: 0 | Status: DISCONTINUED | OUTPATIENT
Start: 2019-09-13 | End: 2019-09-14

## 2019-09-13 RX ORDER — SODIUM CHLORIDE 9 MG/ML
1000 INJECTION, SOLUTION INTRAVENOUS
Refills: 0 | Status: DISCONTINUED | OUTPATIENT
Start: 2019-09-13 | End: 2019-10-04

## 2019-09-13 RX ORDER — DEXTROSE 50 % IN WATER 50 %
12.5 SYRINGE (ML) INTRAVENOUS ONCE
Refills: 0 | Status: DISCONTINUED | OUTPATIENT
Start: 2019-09-13 | End: 2019-09-13

## 2019-09-13 RX ORDER — MAGNESIUM OXIDE 400 MG ORAL TABLET 241.3 MG
400 TABLET ORAL
Refills: 0 | Status: DISCONTINUED | OUTPATIENT
Start: 2019-09-13 | End: 2019-10-04

## 2019-09-13 RX ORDER — ATORVASTATIN CALCIUM 80 MG/1
20 TABLET, FILM COATED ORAL AT BEDTIME
Refills: 0 | Status: DISCONTINUED | OUTPATIENT
Start: 2019-09-13 | End: 2019-10-04

## 2019-09-13 RX ORDER — MONTELUKAST 4 MG/1
10 TABLET, CHEWABLE ORAL DAILY
Refills: 0 | Status: DISCONTINUED | OUTPATIENT
Start: 2019-09-13 | End: 2019-10-04

## 2019-09-13 RX ADMIN — ERTAPENEM SODIUM 120 MILLIGRAM(S): 1 INJECTION, POWDER, LYOPHILIZED, FOR SOLUTION INTRAMUSCULAR; INTRAVENOUS at 10:36

## 2019-09-13 RX ADMIN — SODIUM CHLORIDE 125 MILLILITER(S): 9 INJECTION, SOLUTION INTRAVENOUS at 01:28

## 2019-09-13 RX ADMIN — SODIUM CHLORIDE 500 MILLILITER(S): 9 INJECTION, SOLUTION INTRAVENOUS at 18:26

## 2019-09-13 RX ADMIN — SODIUM CHLORIDE 30 MILLILITER(S): 9 INJECTION INTRAMUSCULAR; INTRAVENOUS; SUBCUTANEOUS at 21:16

## 2019-09-13 RX ADMIN — FAMOTIDINE 20 MILLIGRAM(S): 10 INJECTION INTRAVENOUS at 11:42

## 2019-09-13 RX ADMIN — Medication 12.5 GRAM(S): at 22:38

## 2019-09-13 RX ADMIN — Medication 40 MILLIGRAM(S): at 07:12

## 2019-09-13 RX ADMIN — SODIUM CHLORIDE 30 MILLILITER(S): 9 INJECTION INTRAMUSCULAR; INTRAVENOUS; SUBCUTANEOUS at 07:12

## 2019-09-13 RX ADMIN — MONTELUKAST 10 MILLIGRAM(S): 4 TABLET, CHEWABLE ORAL at 21:16

## 2019-09-13 RX ADMIN — ATORVASTATIN CALCIUM 20 MILLIGRAM(S): 80 TABLET, FILM COATED ORAL at 21:16

## 2019-09-13 RX ADMIN — Medication 50 MILLIGRAM(S): at 18:25

## 2019-09-13 RX ADMIN — TIOTROPIUM BROMIDE 1 CAPSULE(S): 18 CAPSULE ORAL; RESPIRATORY (INHALATION) at 13:08

## 2019-09-13 RX ADMIN — Medication 975 MILLIGRAM(S): at 02:07

## 2019-09-13 RX ADMIN — Medication 5 MILLIGRAM(S): at 07:12

## 2019-09-13 RX ADMIN — SPIRONOLACTONE 25 MILLIGRAM(S): 25 TABLET, FILM COATED ORAL at 07:11

## 2019-09-13 RX ADMIN — SODIUM CHLORIDE 30 MILLILITER(S): 9 INJECTION INTRAMUSCULAR; INTRAVENOUS; SUBCUTANEOUS at 07:07

## 2019-09-13 NOTE — H&P ADULT - NSICDXPASTMEDICALHX_GEN_ALL_CORE_FT
PAST MEDICAL HISTORY:  Asthma     Atrial flutter     Cardiac Pacemaker     Cardiomyopathy     Congestive Heart Failure     Diabetes     Diverticulitis     Gout     HTN - Hypertension     Kidney stone     Patient is Sabianist     Refusal of blood transfusions as patient is Sabianist     Vertigo

## 2019-09-13 NOTE — PROGRESS NOTE ADULT - ASSESSMENT
74 year old female PMH of HFrEF, s/p AICD, asthma, HTN, HLD, atrial fibrillation on eliquis, with previous perforated diverticulosis, Hartmans procedure with multiple RTOR for abd washouts. Admitted 6/30 - 8/2 for feculent peritonitis with septic shock secondary to perforated diverticulitis with hospital course c/b HCAP 2/2 CR Pseudomonas. CT A/P 8/14 with new left lower ventral abdominal wall air-containing fluid collection with intraperitoneal communication to the level of the sigmoid colon stump and Additional enlargement of the left paracolic gutter collection. Repeat CT A/P 8/19 with extraluminal contrast consistent with dehiscence of the sigmoid colon stump. IR Drain Cultures with E. coli, AHS and Bacteroides. Presenting with drain dislodgement.     CT with CT A/P (9/12)  with Left hemicolectomy with right lower quadrant colostomy and ventral abdominal wall dehiscence. Slight interval enlargement of the left paracolic gutter fluid collection measuring up to 3 x 3.9 x 10.2 cm (TR x AP x CC)    Going for IR upsize of drain today. Would send repeat bacterial cultures from drainage. Would continue Ertapenem for now (patient's leukocytosis from admission resolved without switch in antibiotics)    Overall, Intraabdominal Abscess, Rectal Stump Leak, Leukocytosis    --Recommend sending bacterial cultures from drain upsize today  --Continue Ertapenem 1g IV Q24H  --Continue to follow CBC with diff  --Continue to follow temperature curve  --Follow up on preliminary blood cultures    I will continue to follow. Please feel free to contact me with any further questions.    Rex Goode M.D.  [facility] Division of Infectious Disease  8AM-5PM: Pager Number [phone]  After Hours (or if no response): Please contact the Infectious Diseases Office at [phone]

## 2019-09-13 NOTE — DISCHARGE NOTE PROVIDER - NSDCFUSCHEDAPPT_GEN_ALL_CORE_FT
ANDRE BARKER ; 10/16/2019 ; [facility]  ANDRE BARKER ; 10/04/2019 ; [facility]  ANDRE BARKER ; 10/16/2019 ; [facility]  ANDRE BARKER ; 10/04/2019 ; [facility]  ANDRE BARKER ; 10/16/2019 ; [facility]  ANDRE BARKER ; 10/04/2019 ; [facility]  ANDRE BARKER ; 10/16/2019 ; [facility]  ANDRE BARKER ; 10/04/2019 ; [facility]  ANDRE BARKER ; 10/16/2019 ; [facility]  ANDRE BARKER ; 10/04/2019 ; [facility]  ANDRE BARKER ; 10/16/2019 ; [facility]

## 2019-09-13 NOTE — DISCHARGE NOTE PROVIDER - CARE PROVIDER_API CALL
Goldberg, Bradley H (MD)  Internal Medicine  [address] [place], [place] [address]  Phone: [phone]  Fax: [phone]  Follow Up Time: 1 month Goldberg, Bradley H (MD)  Internal Medicine  [address] [place] [address]  Phone: [phone]  Fax: [phone]  Follow Up Time: 1 month    Rex Goode)  Internal Medicine  [address] [place] [address]  Phone: [phone]  Fax: [phone]  Follow Up Time:     Clark Alvarado)  Surgery  [address] [facility], [address]  [place], [place] [id number]  Phone: [phone]  Fax: [phone]  Follow Up Time: Goldberg, Bradley H (MD)  Internal Medicine  [address] [place] [address]  Phone: [phone]  Fax: [phone]  Follow Up Time: 1 month    Rex Goode)  Internal Medicine  [address] [place] [address]  Phone: [phone]  Fax: [phone]  Follow Up Time: 2 weeks    Clark Alvarado)  Surgery  [address] [facility], [address]  [place] [id number]  Phone: [phone]  Fax: [phone]  Follow Up Time: 1 week    Bartolome Neal)  Interventional Radiology and Diagnostic Radiology  [address] [place] [address]  Phone: [phone]  Fax: [phone]  Follow Up Time: 2 weeks Goldberg, Bradley H (MD)  Internal Medicine  [address] [place] [address]  Phone: [phone]  Fax: [phone]  Follow Up Time: 1 month    Rex Goode)  Internal Medicine  [address] [place] [address]  Phone: [phone]  Fax: [phone]  Follow Up Time: 2 weeks    Clark Alvarado)  Surgery  [address] [facility], [address]  [place], [place] [address]  Phone: [phone]  Fax: [phone]  Follow Up Time: 1 week    Bartolome Neal)  Interventional Radiology and Diagnostic Radiology  [address] [place] [address]  Phone: [phone]  Fax: [phone]  Follow Up Time: 2 weeks    Luis Miguel Perez)  Plastic Surgery  [address] [facility], [address]  [place] [address]  Phone: [phone]  Fax: [phone]  Follow Up Time:

## 2019-09-13 NOTE — DISCHARGE NOTE PROVIDER - HOSPITAL COURSE
75 yo Female with PMH of asthma, A fib, dm, HFrEF, pacemaker/AICD, perforated diverticulitis s/p L hemicolectomy and end colostomy w/ multiple washouts and abdominal wall closure w/ strattice (last surgery on 7/11), presents from [place] due to possible dislodgement of IR drain and increased purulent output from her abdomen. Pt daughter at bedside, and she reports while at rehab the top IR drain may have come dislodged and believes that it is in the wrong place. She also reports increased purulent output from the drain and from the drain site. She reports that the pt also had increased purulent output from her abdominal wall during dressing changes. She reports her mother has not had any fevers or chills and that she is currently getting ertapenem for antibiotics. She reports that her mother has minimal PO intake, partially because of her dentures. She denies any n/v.     Of note, pt recently at [facility] prior to being at [facility]. A tube check was performed on 9/6 which showed that her drains were in place at this time.     In the ED, the pt is hemodynamically stable. She has a WBC of 12 (down from 14 at discharge from [facility]), and CT scan showing increase in size of the collection in the L paracolic gutter. Per daughter, she is getting daily dressing changes - aquacel, gauze, abd and tape.         Patient was admitted to [facility], made NPO/IVF, IV abx and IR was consulted for catheter upsizing.        Patient underwent ****** 73 yo Female with PMH of asthma, A fib, dm, HFrEF, pacemaker/AICD, perforated diverticulitis s/p L hemicolectomy and end colostomy w/ multiple washouts and abdominal wall closure w/ strattice (last surgery on 7/11), presents from [place] due to possible dislodgement of IR drain and increased purulent output from her abdomen. Pt daughter at bedside, and she reports while at rehab the top IR drain may have come dislodged and believes that it is in the wrong place. She also reports increased purulent output from the drain and from the drain site. She reports that the pt also had increased purulent output from her abdominal wall during dressing changes. She reports her mother has not had any fevers or chills and that she is currently getting ertapenem for antibiotics. She reports that her mother has minimal PO intake, partially because of her dentures. She denies any n/v.     Of note, pt recently at [facility] prior to being at [facility]. A tube check was performed on 9/6 which showed that her drains were in place at this time.     In the ED, the pt is hemodynamically stable. She has a WBC of 12 (down from 14 at discharge from [facility]), and CT scan showing increase in size of the collection in the L paracolic gutter. Per daughter, she is getting daily dressing changes - aquacel, gauze, abd and tape.         Patient was admitted to [facility], made NPO/IVF, IV abx and IR was consulted for catheter upsizing.        Patient underwent successful upsize of posterior drain to 16 Nigerien drain with 10ml pus aspirated. Pt tolerated the procedure well. ID was consulted and recommended changing Ertapeneme to Meropenem. 73 yo Female with PMH of asthma, A fib, dm, HFrEF, pacemaker/AICD, perforated diverticulitis s/p L hemicolectomy and end colostomy w/ multiple washouts and abdominal wall closure w/ strattice (last surgery on 7/11), presents from [place] due to possible dislodgement of IR drain and increased purulent output from her abdomen. Pt daughter at bedside, and she reports while at rehab the top IR drain may have come dislodged and believes that it is in the wrong place. She also reports increased purulent output from the drain and from the drain site. She reports that the pt also had increased purulent output from her abdominal wall during dressing changes. She reports her mother has not had any fevers or chills and that she is currently getting ertapenem for antibiotics. She reports that her mother has minimal PO intake, partially because of her dentures. She denies any n/v.     Of note, pt recently at [facility] prior to being at [facility]. A tube check was performed on 9/6 which showed that her drains were in place at this time.     In the ED, the pt is hemodynamically stable. She has a WBC of 12 (down from 14 at discharge from [facility]), and CT scan showing increase in size of the collection in the L paracolic gutter. Per daughter, she is getting daily dressing changes - aquacel, gauze, abd and tape.         Patient was admitted to [place] surgery, made NPO/IVF, IV abx and IR was consulted for catheter upsizing.        Patient underwent successful upsize of posterior drain to 16 British Virgin Islander drain with 10ml pus aspirated. Pt tolerated the procedure well. ID was consulted and recommended changing Ertapeneme to Meropenem.         Pain is now well controlled.  Pt is tolerating a diet, voiding and ambulating.  Pt is ready for discharge in stable condition.  Pt will follow up with Dr. Alvarado as an outpatient in one to two weeks. 73 yo Female with PMH of asthma, A fib, dm, HFrEF, pacemaker/AICD, perforated diverticulitis s/p L hemicolectomy and end colostomy w/ multiple washouts and abdominal wall closure w/ strattice (last surgery on 7/11), presents from [place] due to possible dislodgement of IR drain and increased purulent output from her abdomen. Pt daughter at bedside, and she reports while at rehab the top IR drain may have come dislodged and believes that it is in the wrong place. She also reports increased purulent output from the drain and from the drain site. She reports that the pt also had increased purulent output from her abdominal wall during dressing changes. She reports her mother has not had any fevers or chills and that she is currently getting ertapenem for antibiotics. She reports that her mother has minimal PO intake, partially because of her dentures. She denies any n/v.     Of note, pt recently at [facility] prior to being at [facility]. A tube check was performed on 9/6 which showed that her drains were in place at this time.     In the ED, the pt is hemodynamically stable. She has a WBC of 12 (down from 14 at discharge from [facility]), and CT scan showing increase in size of the collection in the L paracolic gutter. Per daughter, she is getting daily dressing changes - aquacel, gauze, abd and tape.         Patient was admitted to [place] surgery, made NPO/IVF, IV abx and IR was consulted for catheter upsizing.        Patient underwent successful upsize of posterior drain to 16 Frisian drain with 10ml pus aspirated. Pt tolerated the procedure well. ID was consulted and recommended Ertapenem daily, with repeat imaging as an outpatient.        Pain is now well controlled.  Pt is tolerating a diet, voiding and ambulating.  Pt is ready for discharge in stable condition.  Pt will follow up with Dr. Alvarado as an outpatient in one to two weeks. 75 yo Female with PMH of asthma, A fib, dm, HFrEF, pacemaker/AICD, perforated diverticulitis s/p L hemicolectomy and end colostomy w/ multiple washouts and abdominal wall closure w/ strattice (last surgery on 7/11), presents from [place] due to possible dislodgement of IR drain and increased purulent output from her abdomen. Pt daughter at bedside, and she reports while at rehab the top IR drain may have come dislodged and believes that it is in the wrong place. She also reports increased purulent output from the drain and from the drain site. She reports that the pt also had increased purulent output from her abdominal wall during dressing changes. She reports her mother has not had any fevers or chills and that she is currently getting ertapenem for antibiotics. She reports that her mother has minimal PO intake, partially because of her dentures. She denies any n/v.     Of note, pt recently at [facility] prior to being at [facility]. A tube check was performed on 9/6 which showed that her drains were in place at this time.     In the ED, the pt is hemodynamically stable. She has a WBC of 12 (down from 14 at discharge from [facility]), and CT scan showing increase in size of the collection in the L paracolic gutter. Per daughter, she is getting daily dressing changes - aquacel, gauze, abd and tape.         Patient was admitted to [place] surgery, made NPO/IVF, IV abx and IR was consulted for catheter upsizing.        Patient underwent successful upsize of posterior drain to 16 Italian drain with 10ml pus aspirated. Pt tolerated the procedure well. ID was consulted and recommended Ertapenem daily, with repeat imaging as an outpatient.        Pain is now well controlled. Pt is tolerating a diet, voiding and ambulating.  Pt is ready for discharge in stable condition.  Pt will follow up with Dr. Alvarado as an outpatient in one to two weeks. 75 yo Female with PMH of asthma, A fib, dm, HFrEF, pacemaker/AICD, perforated diverticulitis s/p L hemicolectomy and end colostomy w/ multiple washouts and abdominal wall closure w/ strattice (last surgery on 7/11), presents from [place] due to possible dislodgement of IR drain and increased purulent output from her abdomen. Pt daughter at bedside, and she reports while at rehab the top IR drain may have come dislodged and believes that it is in the wrong place. She also reports increased purulent output from the drain and from the drain site. She reports that the pt also had increased purulent output from her abdominal wall during dressing changes. She reports her mother has not had any fevers or chills and that she is currently getting Ertapenem for antibiotics. She reports that her mother has minimal PO intake, partially because of her dentures. She denies any n/v.     Of note, pt recently at [facility] prior to being at [facility]. A tube check was performed on 9/6 which showed that her drains were in place at this time.     In the ED, the pt is hemodynamically stable. She has a WBC of 12 (down from 14 at discharge from [facility]), and CT scan showing increase in size of the collection in the L paracolic gutter. Per daughter, she is getting daily dressing changes - aquacel, gauze, abd and tape.         Patient was admitted to [place] surgery, made NPO/IVF, IV abx and IR was consulted for catheter upsizing.        Patient underwent successful upsize of posterior drain to 16 Jordanian drain with 10ml pus aspirated. Pt tolerated the procedure well. ID was consulted and recommended Ertapenem daily, with repeat imaging as an outpatient.        Pain is now well controlled. Pt is tolerating a diet, voiding and ambulating.  Pt is ready for discharge in stable condition.  Pt will follow up with Dr. Alvarado as an outpatient in one to two weeks. 75 yo Female with PMH of asthma, A fib, dm, HFrEF, pacemaker/AICD, perforated diverticulitis s/p L hemicolectomy and end colostomy w/ multiple washouts and abdominal wall closure w/ strattice (last surgery on 7/11), presents from [place] due to possible dislodgement of IR drain and increased purulent output from her abdomen. Pt daughter at bedside, and she reports while at rehab the top IR drain may have come dislodged and believes that it is in the wrong place. She also reports increased purulent output from the drain and from the drain site. She reports that the pt also had increased purulent output from her abdominal wall during dressing changes. She reports her mother has not had any fevers or chills and that she is currently getting Ertapenem for antibiotics. She reports that her mother has minimal PO intake, partially because of her dentures. She denies any n/v.     Of note, pt recently at [facility] prior to being at [facility]. A tube check was performed on 9/6 which showed that her drains were in place at this time.         In the ED, the pt is hemodynamically stable. She has a WBC of 12 (down from 14 at discharge from [facility]), and CT scan showing increase in size of the collection in the L paracolic gutter. Per daughter, she is getting daily dressing changes - aquacel, gauze, abd and tape.         Patient was admitted to [place] surgery, made NPO/IVF, IV abx and IR was consulted for catheter upsizing.        Patient underwent successful upsize of posterior drain to 16 Serbian drain with 10ml pus aspirated. Pt tolerated the procedure well. ID was consulted and recommended Ertapenem daily, with repeat imaging as an outpatient.        Pain is now well controlled. Pt is tolerating a diet, voiding and ambulating.  Pt is ready for discharge in stable condition.  Pt will follow up with Dr. Alvarado as an outpatient in one to two weeks. 75 yo Female with PMH of asthma, A fib, dm, HFrEF, pacemaker/AICD, perforated diverticulitis s/p L hemicolectomy and end colostomy w/ multiple washouts and abdominal wall closure w/ strattice (last surgery on 7/11), presents from [place] due to possible dislodgement of IR drain and increased purulent output from her abdomen. Pt daughter at bedside, and she reports while at rehab the top IR drain may have come dislodged and believes that it is in the wrong place. She also reports increased purulent output from the drain and from the drain site. She reports that the pt also had increased purulent output from her abdominal wall during dressing changes. She reports her mother has not had any fevers or chills and that she is currently getting Ertapenem for antibiotics. She reports that her mother has minimal PO intake, partially because of her dentures. She denies any n/v.     Of note, pt recently at [facility] prior to being at [facility]. A tube check was performed on 9/6 which showed that her drains were in place at this time.         In the ED, the pt is hemodynamically stable. She has a WBC of 12 (down from 14 at discharge from [facility]), and CT scan showing increase in size of the collection in the L paracolic gutter. Per daughter, she is getting daily dressing changes - aquacel, gauze, abd and tape.         Patient was admitted to [place] surgery, made NPO/IVF, IV abx and IR was consulted for catheter upsizing.        Patient underwent successful upsize of posterior drain to 16 Cook Islander drain with 10ml pus aspirated. Pt tolerated the procedure well. ID was consulted and recommended Ertapenem daily, with repeat imaging as an outpatient. Cx w/ E Coli and enterococcus. ID consulted and recommended Ertapenem, Vancomycin. Wound VAC placed over abdominal wound on 9/16 and Plastics consulted for skin graft evaluation. Pt was taken to the OR on 9/23 with plastics Dr. Luis Miguel Perez and underwent a STSG over abdominal wound with VAC placed intraop. Pt tolerated the procedure well. Pt continued to do well. VAC changes and wound care performed. Per ID's final recs, pt to continue Ertapenem x 28 day course via PICC. PICC placed on 10/4 with IR.         Pain is now well controlled. Pt is tolerating a diet, voiding and ambulating.  Pt is ready for discharge in stable condition.  Pt will follow up with Dr. Alvarado, Dr. Perez (plastics), and Dr. Neal (IR), Dr. Goode (ID), Goldberg (Heme/Onc) and  as an outpatient in one to two weeks.

## 2019-09-13 NOTE — H&P ADULT - NSHPLABSRESULTS_GEN_ALL_CORE
< from: CT Abdomen and Pelvis w/ IV Cont (09.12.19 @ 23:41) >    FINDINGS:    LOWER CHEST: Trace left pleural effusion and near complete left lower   lobe atelectasis. Pacing electrodes in the right atrium, right ventricle   and coronary sinus.    HEPATOBILIARY: Cholecystectomy. Liver and bile ducts within normal limits.  PANCREAS: Atrophic.  SPLEEN: Within normal limits.  ADRENALS: Within normal limits.    KIDNEYS/URETERS/BLADDER: Nonobstructing left renal calculi measuring 6   and 7 mm. Symmetric nephrographic phase of renal enhancement. No   hydronephrosis. Bladder within normal limits.  REPRODUCTIVE ORGANS: Calcified uterine fibroids. Adnexa appear within   normal limits.    BOWEL/PERITONEUM/SOFT TISSUES: Left hemicolectomy with right lower   quadrant colostomy and ventral abdominal wall dehiscence. 2 left lower   quadrant percutaneous pigtail drainage catheters. The more superior   drainage catheter tip is present in the left iliac fossa within a   collection of fluidalong the left paracolic gutter measuring up to 3 x   3.9 x 10.2 cm (TR x AP x CC), which previously measured 2.3 x 3.3 x 8 cm.   The more inferior catheter tip is located in the ventral lower pelvis   immediately subjacent to the dehiscent lower abdominal incision, in close   proximity to loops of bowel and within a tiny amorphous collection of   fluid and gas that appears to communicate openly with the skin. No bowel   obstruction or pneumoperitoneum.  The remainder of the colon demonstrates   diffuse diverticulosis. Portions of the gastrointestinal tract are   collapsed, limiting evaluation.     VESSELS:  Mild calcific atherosclerosis. No abdominal aortic aneurysm.  LYMPHATICS/RETROPERITONEUM: No lymphadenopathy. No retroperitoneal   hematoma.      BONES: Within normal limits. Spinal degenerative changes.    IMPRESSION: Left hemicolectomy with right lower quadrant colostomy and   ventral abdominal wall dehiscence. Slight interval enlargement of the   left paracolic gutter fluid collection measuring up to 3 x 3.9 x 10.2 cm   (TR x AP x CC), previously 2.3 x 3.3 x 8 cm on 9/3/2019.    < end of copied text >

## 2019-09-13 NOTE — PHYSICAL THERAPY INITIAL EVALUATION ADULT - ACTIVE RANGE OF MOTION EXAMINATION, REHAB EVAL
Left UE Active ROM was WFL (within functional limits)/Right UE Active ROM was WFL (within functional limits)/Right LE Active ROM was WFL (within functional limits)/Bilateral Hip ROM not assessed secondary to pain and discomfort/Left LE Active ROM was WFL (within functional limits)

## 2019-09-13 NOTE — H&P ADULT - HISTORY OF PRESENT ILLNESS
73 yo Female with PMH of asthma, A fib, dm, HFrEF, pacemaker/AICD, perforated diverticulitis s/p L hemicolectomy and end colostomy w/ multiple washouts and abdominal wall closure w/ strattice (last surgery on 7/11), presents from [place] due to possible dislodgement of IR drain and increased purulent output from her abdomen. Pt daughter at bedside, and she reports while at rehab the top IR drain may have come dislodged and believes that it is in the wrong place. She also reports increased purulent output from the drain and from the drain site. She reports that the pt also had increased purulent output from her abdominal wall during dressing changes. She reports her mother has not had any fevers or chills and that she is currently getting ertapenem for antibiotics. She reports that her mother has minimal PO intake, partially because of her dentures. She denies any n/v.   Of note, pt recently at [facility] prior to being at [facility]. A tube check was performed on 9/6 which showed that her drains were in place at this time.   In the ED, the pt is hemodynamically stable. She has a WBC of 12 (down from 14 at discharge from [facility]), and CT scan showing increase in size of the collection in the L paracolic gutter. 75 yo Female with PMH of asthma, A fib, dm, HFrEF, pacemaker/AICD, perforated diverticulitis s/p L hemicolectomy and end colostomy w/ multiple washouts and abdominal wall closure w/ strattice (last surgery on 7/11), presents from [place] due to possible dislodgement of IR drain and increased purulent output from her abdomen. Pt daughter at bedside, and she reports while at rehab the top IR drain may have come dislodged and believes that it is in the wrong place. She also reports increased purulent output from the drain and from the drain site. She reports that the pt also had increased purulent output from her abdominal wall during dressing changes. She reports her mother has not had any fevers or chills and that she is currently getting ertapenem for antibiotics. She reports that her mother has minimal PO intake, partially because of her dentures. She denies any n/v.   Of note, pt recently at [facility] prior to being at [facility]. A tube check was performed on 9/6 which showed that her drains were in place at this time.   In the ED, the pt is hemodynamically stable. She has a WBC of 12 (down from 14 at discharge from [facility]), and CT scan showing increase in size of the collection in the L paracolic gutter.     Per daughter, she is getting daily dressing changes - aquacel, gauze, abd and tape.

## 2019-09-13 NOTE — DISCHARGE NOTE PROVIDER - CARE PROVIDERS DIRECT ADDRESSES
,DirectAddress_Unknown ,DirectAddress_Unknown,DirectAddress_Unknown,DirectAddress_Unknown ,DirectAddress_Unknown,DirectAddress_Unknown,DirectAddress_Unknown,[email or web address] ,DirectAddress_Unknown,DirectAddress_Unknown,DirectAddress_Unknown,[email or web address],DirectAddress_Unknown

## 2019-09-13 NOTE — H&P ADULT - ATTENDING COMMENTS
I have seen and evaluated the patient and discussed the relevant clinical findings and plan with the surgical housestaff and fellow.  Agree with the above documentation with addenda as noted.     Clinically stable but with some abdominal discomfort.  Afebrile  Drains are in place draining pus  CT shows slight enlargement of left paracolic fluid collection; drain is sitting in the collection.  May need upsizing.    Will consult IR today.    Clark Alvarado MD

## 2019-09-13 NOTE — PHYSICAL THERAPY INITIAL EVALUATION ADULT - STRENGTHENING, PT EVAL
GOAL: Pt will increase bilateral LE strength by at least 1/2 MMT grade to assist with all functional transfers and mobility in 3-4 wks.

## 2019-09-13 NOTE — PHYSICAL THERAPY INITIAL EVALUATION ADULT - MODALITIES TREATMENT COMMENTS
Patient with granular full thickness abd wounds, lower smaller wound with IR drain in-situ with +purulent material draining when expressed manually

## 2019-09-13 NOTE — PROGRESS NOTE ADULT - SUBJECTIVE AND OBJECTIVE BOX
Patient is a 74y old  Female who presents with a chief complaint of Possible dislodgement of IR drain, increased purulent output (13 Sep 2019 15:25)      HPI:    74 year old Female with PMH asthma, A fib, dm, HFrEF, pacemaker/AICD, perforated diverticulitis s/p L hemicolectomy and end colostomy w/ multiple washouts and abdominal wall closure w/ strattice (last surgery on 7/11), presents from [place] due to possible dislodgement of IR drain and increased purulent output from her abdomen.  Of note, admitted 9/3 - 9/9 with shortness of breath CT C/A/P with no PE or PNA and no interval change in collections. Received corticosteroids for reactive airway disease. Patient discharged again on her course of Ertapenem.     At rehab the top IR drain may have come dislodged. She also reports increased purulent output from the drain and from the drain site. She reports that the pt also had increased purulent output from her abdominal wall during dressing changes. No chills or fevers. Minimal PO intake since discharge. No N/V.  A tube check was performed on 9/6 which showed that her drains were in place at this time.     In the ED afebrile, normotensive but tachycardic to 90. On presentation WBC of 12.3 with 75.3% PMN. CXR Clear. CT A/P with Left hemicolectomy with right lower quadrant colostomy and ventral abdominal wall dehiscence. Slight interval enlargement of the left paracolic gutter fluid collection measuring up to 3 x 3.9 x 10.2 cm (TR x AP x CC)         prior hospital charts reviewed [  x]  primary team notes reviewed [x  ]  other consultant notes reviewed [x  ]    PAST MEDICAL & SURGICAL HISTORY:  Refusal of blood transfusions as patient is L[place]  Patient is Latter-day  Vertigo  Atrial flutter  Diverticulitis  Cardiomyopathy  Kidney stone  Cardiac Pacemaker  HTN - Hypertension  Gout  Diabetes  Congestive Heart Failure  Asthma  Status post left hemicolectomy  S/P cholecystectomy  AICD (Automatic Cardioverter/Defibrillator) Present: inserted in Aug, 2008. Due for battery change in 1 month. ( [employer]) . Inserted by Dr Duffy    SOCIAL HISTORY:  No smoking, etoh use or drug use.     FAMILY HISTORY:  Family history of brain tumor    Allergies  Coreg (Other)  digoxin (Other; Short breath (Mild to Mod))  IV Contrast (Unknown)  penicillins (Hives)    ANTIMICROBIALS (past 90 days)  MEDICATIONS  (STANDING):  ertapenem  IVPB   120 mL/Hr IV Intermittent (09-13-19 @ 10:36)      ANTIMICROBIALS:    ertapenem  IVPB 1000 every 24 hours    OTHER MEDS: MEDICATIONS  (STANDING):  atorvastatin 20 at bedtime  dextrose 40% Gel 15 once PRN  dextrose 50% Injectable 12.5 once  dextrose 50% Injectable 25 once  dextrose 50% Injectable 25 once  enalapril 5 daily  famotidine    Tablet 20 daily  furosemide    Tablet 40 daily  glucagon  Injectable 1 once PRN  insulin lispro (HumaLOG) corrective regimen sliding scale  every 6 hours  montelukast 10 daily  spironolactone 25 daily  tiotropium 18 MICROgram(s) Capsule 1 daily        REVIEW OF SYSTEMS  [  ] ROS unobtainable because:    [x  ] All other systems negative except as noted below:	    Constitutional:  [ ] fever [ ] chills  [ ] weight loss  [ ] weakness  Skin:  [ ] rash [ ] phlebitis	  Eyes: [ ] icterus [ ] pain  [ ] discharge	  ENMT: [ ] sore throat  [ ] thrush [ ] ulcers [ ] exudates  Respiratory: [ ] dyspnea [ ] hemoptysis [ ] cough [ ] sputum	  Cardiovascular:  [ ] chest pain [ ] palpitations [ ] edema	  Gastrointestinal:  [ ] nausea [ ] vomiting [ ] diarrhea [ ] constipation [x ] pain +drainage from midline wound	  Genitourinary:  [ ] dysuria [ ] frequency [ ] hematuria [ ] discharge [ ] flank pain  [ ] incontinence  Musculoskeletal:  [ ] myalgias [ ] arthralgias [ ] arthritis  [ ] back pain  Neurological:  [ ] headache [ ] seizures  [ ] confusion/altered mental status  Psychiatric:  [ ] anxiety [ ] depression	  Hematology/Lymphatics:  [ ] lymphadenopathy  Endocrine:  [ ] adrenal [ ] thyroid  Allergic/Immunologic:	 [ ] transplant [ ] seasonal    Vital Signs Last 24 Hrs  T(F): 97.3 (09-13-19 @ 08:30), Max: 98.1 (09-09-19 @ 16:45)  Vital Signs Last 24 Hrs  HR: 101 (09-13-19 @ 12:50) (64 - 101)  BP: 114/74 (09-13-19 @ 12:50) (101/58 - 117/73)  RR: 18 (09-13-19 @ 08:30)  SpO2: 98% (09-13-19 @ 12:50) (92% - 100%)  Wt(kg): --    PHYSICAL EXAMINATION:  General: Alert and Awake, NAD  HEENT: PERRL, EOMI, No subconjunctival hemorrhages, Oropharynx Clear, MMM  Neck: Supple, No SARAHI  Cardiac: RRR, No M/R/G  Resp: CTAB, No Wh/Rh/Ra  Abdomen: NBS, ND, Diffusely tender to palpation worst in LLQ. Abdominal wall defect with inferior aspect with purulent drainage. LLQ drain with purulent drainage, No HSM, No rigidity or guarding  MSK: No LE edema. No stigmata of IE. No evidence of phlebitis. No evidence of synovitis.  : No starr  Skin: No rashes. Skin is warm and dry to the touch.   Neuro: Alert and Awake. CN 2-12 Grossly intact. Moves all four extremities spontaneously.  Psych: Calm, Pleasant, Cooperative  Vasc: RUE PICC line (No surrounding erythema, drainage or tenderness to palpation)                          7.2    8.51  )-----------( 253      ( 13 Sep 2019 10:16 )             23.8     09-13    132<L>  |  96  |  18  ----------------------------<  190<H>  4.2   |  22  |  0.70    Ca    8.1<L>      13 Sep 2019 07:28  Phos  3.6     09-13  Mg     2.0     09-13    TPro  6.7  /  Alb  2.7<L>  /  TBili  0.3  /  DBili  x   /  AST  9<L>  /  ALT  7<L>  /  AlkPhos  126<H>  09-12    MICROBIOLOGY:    none for review    RADIOLOGY:  imaging below personally reviewed    EXAM:  XR CHEST AP OR PA 1V                        PROCEDURE DATE:  09/12/2019    Interval placement of right-sided PICC line with tip terminating in the   superior vena cava.    EXAM:  CT ABDOMEN AND PELVIS IC                        PROCEDURE DATE:  09/12/2019  Left hemicolectomy with right lower quadrant colostomy and ventral abdominal wall dehiscence. Slight interval enlargement of the left paracolic gutter fluid collection measuring up to 3 x 3.9 x 10.2 cm (TR x AP x CC), previously 2.3 x 3.3 x 8 cm on 9/3/2019.

## 2019-09-13 NOTE — PHYSICAL THERAPY INITIAL EVALUATION ADULT - PERTINENT HX OF CURRENT PROBLEM, REHAB EVAL
Pt is a 75 y/o Female with PMH of asthma, A fib, dm, HFrEF, pacemaker/AICD, perforated diverticulitis s/p L hemicolectomy and end colostomy w/ multiple washouts and abdominal wall closure w/ strattice (last surgery on 7/11), presents from [place] due to possible dislodgement of IR drain and increased purulent output from her abdomen.

## 2019-09-13 NOTE — CONSULT NOTE ADULT - ASSESSMENT
74 year old female PMH of HFrEF, s/p AICD, asthma, HTN, HLD, atrial fibrillation on eliquis, with previous perforated diverticulosis, Hartmans procedure with multiple RTOR for abd washouts. Admitted 6/30 - 8/2 for feculent peritonitis with septic shock secondary to perforated diverticulitis with hospital course c/b HCAP 2/2 CR Pseudomonas. CT A/P 8/14 with new left lower ventral abdominal wall air-containing fluid collection with intraperitoneal communication to the level of the sigmoid colon stump and Additional enlargement of the left paracolic gutter collection. Repeat CT A/P 8/19 with extraluminal contrast consistent with dehiscence of the sigmoid colon stump. IR Drain Cultures with E. coli, AHS and Bacteroides. Presenting with drain dislodgement.     CT with CT A/P (9/12)  with Left hemicolectomy with right lower quadrant colostomy and ventral abdominal wall dehiscence. Slight interval enlargement of the left paracolic gutter fluid collection measuring up to 3 x 3.9 x 10.2 cm (TR x AP x CC)    Going for IR upsize of drain today. Would send repeat bacterial cultures from drainage. Would continue Ertapenem for now (patient's leukocytosis from admission resolved without switch in antibiotics)    Overall, Intraabdominal Abscess, Rectal Stump Leak, Leukocytosis    --Recommend sending bacterial cultures from drain upsize today  --Continue Ertapenem 1g IV Q24H  --Continue to follow CBC with diff  --Continue to follow temperature curve  --Follow up on preliminary blood cultures    I will continue to follow. Please feel free to contact me with any further questions.    Rex Goode M.D.  [facility] Division of Infectious Disease  8AM-5PM: Pager Number [phone]  After Hours (or if no response): Please contact the Infectious Diseases Office at [phone]

## 2019-09-13 NOTE — PROGRESS NOTE ADULT - SUBJECTIVE AND OBJECTIVE BOX
Interventional Radiology Brief Post Procedure Note    Procedure: Abdominal drain upsize to 16 Scottish    Operators: Luis Miguel Walker MD    Anesthesia (type): Local lidocaine.    Contrast: None.    EBL: None.    Findings/Follow up Plan of Care: Successful upsize of more posterior drain to 16 Mauritanian. Pus aspirated and sent for testing.     Specimens Removed: 10 mL purulent material.    Implants: 16 Mauritanian pigtail catheter.     Complications: No immediate complications.     Condition/Disposition: Back to floors.     Please call Interventional Radiology x [phone] with any questions, concerns, or issues.

## 2019-09-13 NOTE — H&P ADULT - NSHPPHYSICALEXAM_GEN_ALL_CORE
General: well developed, NAD  Neuro: alert and oriented, no focal deficits, moves all extremities spontaneously  HEENT: NCAT, EOMI, anicteric, mucosa moist  Respiratory: airway patent, respirations unlabored  CVS: regular rate and rhythm  Abdomen: soft, midline abdominal wound dressing c/d/i ostomy pink/viable, two IR drain sites w/ purulent drainage, more medial drain site w/ purulent drainage at drain site, unable to examine abdominal wound as pt and daughter refused at this time  Extremities: no edema, sensation and movement grossly intact  Skin: warm, dry, appropriate color

## 2019-09-13 NOTE — CONSULT NOTE ADULT - PROBLEM SELECTOR RECOMMENDATION 9
-to be seen by attending  FINA Hutchinson PGY2 -No acute intervention  - Warm Compresses  -Keep barrier dry   - Moisture Barrier Cream (Possibly heavy duty zinc)  FINA Hutchinson PGY2  seen w/ Dr. Storey -No acute intervention  - Warm Compresses  -Keep area dry   - Moisture Barrier Cream (Possibly heavy duty zinc)  FINA Hutchinson PGY2  seen w/ Dr. Storey -No acute intervention  - Warm Compresses  -Keep area dry   -Signed off. Please reconsult as needed  - Moisture Barrier Cream (Possibly heavy duty zinc)  FINA Hutchinson PGY2  seen w/ Dr. Storey

## 2019-09-13 NOTE — CONSULT NOTE ADULT - SUBJECTIVE AND OBJECTIVE BOX
HPI:    74 year old Female with PMH asthma, A fib, dm, HFrEF, pacemaker/AICD, perforated diverticulitis s/p L hemicolectomy and end colostomy w/ multiple washouts and abdominal wall closure w/ strattice (last surgery on 7/11), presents from [place] due to possible dislodgement of IR drain and increased purulent output from her abdomen.  Of note, admitted 9/3 - 9/9 with shortness of breath CT C/A/P with no PE or PNA and no interval change in collections. Received corticosteroids for reactive airway disease. Patient discharged again on her course of Ertapenem.     At rehab the top IR drain may have come dislodged. She also reports increased purulent output from the drain and from the drain site. She reports that the pt also had increased purulent output from her abdominal wall during dressing changes. No chills or fevers. Minimal PO intake since discharge. No N/V.  A tube check was performed on 9/6 which showed that her drains were in place at this time.     In the ED afebrile, normotensive but tachycardic to 90. On presentation WBC of 12.3 with 75.3% PMN. CXR Clear. CT A/P with Left hemicolectomy with right lower quadrant colostomy and ventral abdominal wall dehiscence. Slight interval enlargement of the left paracolic gutter fluid collection measuring up to 3 x 3.9 x 10.2 cm (TR x AP x CC)         prior hospital charts reviewed [  x]  primary team notes reviewed [x  ]  other consultant notes reviewed [x  ]    PAST MEDICAL & SURGICAL HISTORY:  Refusal of blood transfusions as patient is [place]  Patient is [place]  Vertigo  Atrial flutter  Diverticulitis  Cardiomyopathy  Kidney stone  Cardiac Pacemaker  HTN - Hypertension  Gout  Diabetes  Congestive Heart Failure  Asthma  Status post left hemicolectomy  S/P cholecystectomy  AICD (Automatic Cardioverter/Defibrillator) Present: inserted in Aug, 2008. Due for battery change in 1 month. ( [employer]) . Inserted by Dr Duffy    SOCIAL HISTORY:  No smoking, etoh use or drug use.     FAMILY HISTORY:  Family history of brain tumor    Allergies  Coreg (Other)  digoxin (Other; Short breath (Mild to Mod))  IV Contrast (Unknown)  penicillins (Hives)    ANTIMICROBIALS (past 90 days)  MEDICATIONS  (STANDING):  ertapenem  IVPB   120 mL/Hr IV Intermittent (09-13-19 @ 10:36)      ANTIMICROBIALS:    ertapenem  IVPB 1000 every 24 hours    OTHER MEDS: MEDICATIONS  (STANDING):  atorvastatin 20 at bedtime  dextrose 40% Gel 15 once PRN  dextrose 50% Injectable 12.5 once  dextrose 50% Injectable 25 once  dextrose 50% Injectable 25 once  enalapril 5 daily  famotidine    Tablet 20 daily  furosemide    Tablet 40 daily  glucagon  Injectable 1 once PRN  insulin lispro (HumaLOG) corrective regimen sliding scale  every 6 hours  montelukast 10 daily  spironolactone 25 daily  tiotropium 18 MICROgram(s) Capsule 1 daily        REVIEW OF SYSTEMS  [  ] ROS unobtainable because:    [x  ] All other systems negative except as noted below:	    Constitutional:  [ ] fever [ ] chills  [ ] weight loss  [ ] weakness  Skin:  [ ] rash [ ] phlebitis	  Eyes: [ ] icterus [ ] pain  [ ] discharge	  ENMT: [ ] sore throat  [ ] thrush [ ] ulcers [ ] exudates  Respiratory: [ ] dyspnea [ ] hemoptysis [ ] cough [ ] sputum	  Cardiovascular:  [ ] chest pain [ ] palpitations [ ] edema	  Gastrointestinal:  [ ] nausea [ ] vomiting [ ] diarrhea [ ] constipation [x ] pain +drainage from midline wound	  Genitourinary:  [ ] dysuria [ ] frequency [ ] hematuria [ ] discharge [ ] flank pain  [ ] incontinence  Musculoskeletal:  [ ] myalgias [ ] arthralgias [ ] arthritis  [ ] back pain  Neurological:  [ ] headache [ ] seizures  [ ] confusion/altered mental status  Psychiatric:  [ ] anxiety [ ] depression	  Hematology/Lymphatics:  [ ] lymphadenopathy  Endocrine:  [ ] adrenal [ ] thyroid  Allergic/Immunologic:	 [ ] transplant [ ] seasonal    Vital Signs Last 24 Hrs  T(F): 97.3 (09-13-19 @ 08:30), Max: 98.1 (09-09-19 @ 16:45)  Vital Signs Last 24 Hrs  HR: 101 (09-13-19 @ 12:50) (64 - 101)  BP: 114/74 (09-13-19 @ 12:50) (101/58 - 117/73)  RR: 18 (09-13-19 @ 08:30)  SpO2: 98% (09-13-19 @ 12:50) (92% - 100%)  Wt(kg): --    PHYSICAL EXAMINATION:  General: Alert and Awake, NAD  HEENT: PERRL, EOMI, No subconjunctival hemorrhages, Oropharynx Clear, MMM  Neck: Supple, No SARAHI  Cardiac: RRR, No M/R/G  Resp: CTAB, No Wh/Rh/Ra  Abdomen: NBS, ND, Diffusely tender to palpation worst in LLQ. Abdominal wall defect with inferior aspect with purulent drainage. LLQ drain with purulent drainage, No HSM, No rigidity or guarding  MSK: No LE edema. No stigmata of IE. No evidence of phlebitis. No evidence of synovitis.  : No starr  Skin: No rashes. Skin is warm and dry to the touch.   Neuro: Alert and Awake. CN 2-12 Grossly intact. Moves all four extremities spontaneously.  Psych: Calm, Pleasant, Cooperative  Vasc: RUE PICC line (No surrounding erythema, drainage or tenderness to palpation)                          7.2    8.51  )-----------( 253      ( 13 Sep 2019 10:16 )             23.8     09-13    132<L>  |  96  |  18  ----------------------------<  190<H>  4.2   |  22  |  0.70    Ca    8.1<L>      13 Sep 2019 07:28  Phos  3.6     09-13  Mg     2.0     09-13    TPro  6.7  /  Alb  2.7<L>  /  TBili  0.3  /  DBili  x   /  AST  9<L>  /  ALT  7<L>  /  AlkPhos  126<H>  09-12    MICROBIOLOGY:    none for review    RADIOLOGY:  imaging below personally reviewed    EXAM:  XR CHEST AP OR PA 1V                        PROCEDURE DATE:  09/12/2019    Interval placement of right-sided PICC line with tip terminating in the   superior vena cava.    EXAM:  CT ABDOMEN AND PELVIS IC                        PROCEDURE DATE:  09/12/2019  Left hemicolectomy with right lower quadrant colostomy and ventral abdominal wall dehiscence. Slight interval enlargement of the left paracolic gutter fluid collection measuring up to 3 x 3.9 x 10.2 cm (TR x AP x CC), previously 2.3 x 3.3 x 8 cm on 9/3/2019.

## 2019-09-13 NOTE — PHYSICAL THERAPY INITIAL EVALUATION ADULT - BALANCE TRAINING, PT EVAL
GOAL: Pt will improve sitting static/dynamic balance by 1/2 grade to improve independence with ADLs in 3-4 wks.

## 2019-09-13 NOTE — DISCHARGE NOTE PROVIDER - NSDCFUADDINST_GEN_ALL_CORE_FT
Wound Care:   ~Xeroform every other day to abdominal graft site, covered with ABD or gauze.   ~Ostomy pouch for fistula management per ostomy nurses (approximately q5d changes)  ~Leave xeroform on donor right thigh site until follow up visit with Dr. Perez (1 week) but PLEASE change ABD every day. It adheres to patient and she is very uncomfortable  if it is on for more than one day Wound Care:   ~Xeroform every other day to abdominal graft site, covered with ABD or gauze.   ~Ostomy pouch for fistula management per ostomy nurses (approximately q5d changes)  ~Leave xeroform on donor right thigh site until follow up visit with Dr. Perez (1 week) but PLEASE change ABD every day. It adheres to patient and she is very uncomfortable  if it is on for more than one day  Drain: Flush with 5 cc normal saline once daily. Changed dressing around drain site once a day. Keep dressing clean and dry.

## 2019-09-13 NOTE — PROGRESS NOTE ADULT - SUBJECTIVE AND OBJECTIVE BOX
Interventional Radiology     IR requested for evaluation of the patient's 2 left abdominal drainage catheters that were reportedly clogged.      CT scan reported, < from: CT Abdomen and Pelvis w/ IV Cont (09.12.19 @ 23:41) >   The more superior   drainage catheter tip is present in the left iliac fossa within a   collection of fluidalong the left paracolic gutter measuring up to 3 x   3.9 x 10.2 cm (TR x AP x CC), which previously measured 2.3 x 3.3 x 8 cm.   The more inferior catheter tip is located in the ventral lower pelvis   immediately subjacent to the dehiscent lower abdominal incision, in close   proximity to loops of bowel and within a tiny amorphous collection of   fluid and gas that appears to communicate openly with the skin.    < end of copied text > Interventional Radiology     IR requested for evaluation of the patient's 2 left abdominal drainage catheters that were reportedly clogged without output.   Pt had CT scan demonstrating increased fluid collection to more superior drainage catheter as below.    CT scan reported, < from: CT Abdomen and Pelvis w/ IV Cont (09.12.19 @ 23:41) >   The more superior   drainage catheter tip is present in the left iliac fossa within a   collection of fluidalong the left paracolic gutter measuring up to 3 x   3.9 x 10.2 cm (TR x AP x CC), which previously measured 2.3 x 3.3 x 8 cm.   The more inferior catheter tip is located in the ventral lower pelvis   immediately subjacent to the dehiscent lower abdominal incision, in close   proximity to loops of bowel and within a tiny amorphous collection of   fluid and gas that appears to communicate openly with the skin.    < end of copied text >    pt c/o pain around the drainage catheter sites.      Vital Signs Last 24 Hrs  T(C): 36.3 (13 Sep 2019 08:30), Max: 36.7 (12 Sep 2019 19:56)  T(F): 97.3 (13 Sep 2019 08:30), Max: 98 (12 Sep 2019 19:56)  HR: 101 (13 Sep 2019 12:50) (64 - 101)  BP: 114/74 (13 Sep 2019 12:50) (101/58 - 127/81)  BP(mean): --  RR: 18 (13 Sep 2019 08:30) (16 - 18)  SpO2: 98% (13 Sep 2019 12:50) (91% - 100%)    General Appearance: NAD    Abdomen: surgical site dressing intact over mid-abdomen.  both IR pigtail drainage catheters located Left side abdomen.  The more medial drainage catheter has some skin breakdown around the catheter.  when the more medial catheter was flushed saline was seen leaking from medial surgical incision site.  the lateral catheter was flushed without difficulty and return flow was seen to leg bag.  dressings were changed with covering RN.     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  Pt was referred to IR for evaluation of drainage catheter and for possible IR intervention and upsizing of catheters.      Pt had previous upsizing on 8/21/2019 for 10F to 14F drainage catheter.  Will discuss case with IR attending.    PRASANNA Pat  [facility] [id number]  Ext [phone]

## 2019-09-13 NOTE — CHART NOTE - NSCHARTNOTEFT_GEN_A_CORE
POST-OPERATIVE NOTE    Subjective:  Patient is s/p IR drain upsizing . Recovering appropriately.  n/v. Drain putting out purulent material.     Vital Signs Last 24 Hrs  T(C): 36.7 (13 Sep 2019 20:52), Max: 36.8 (13 Sep 2019 18:09)  T(F): 98 (13 Sep 2019 20:52), Max: 98.3 (13 Sep 2019 18:09)  HR: 88 (13 Sep 2019 20:52) (79 - 101)  BP: 113/60 (13 Sep 2019 20:52) (106/87 - 134/67)  BP(mean): --  RR: 17 (13 Sep 2019 20:52) (17 - 18)  SpO2: 100% (13 Sep 2019 21:42) (94% - 100%)  I&O's Detail    13 Sep 2019 07:01  -  13 Sep 2019 22:55  --------------------------------------------------------  IN:    IV PiggyBack: 50 mL    sodium chloride 0.9%.: 300 mL  Total IN: 350 mL    OUT:    Drain: 30 mL    Drain: 1 mL  Total OUT: 31 mL    Total NET: 319 mL        ertapenem  IVPB 1000  enalapril 5  ertapenem  IVPB 1000  furosemide    Tablet 40  spironolactone 25    PAST MEDICAL & SURGICAL HISTORY:  Refusal of blood transfusions as patient is [place]  Patient is [place]ian[place]  Vertigo  Atrial flutter  Diverticulitis  Cardiomyopathy  Kidney stone  Cardiac Pacemaker  HTN - Hypertension  Gout  Diabetes  Congestive Heart Failure  Asthma  Status post left hemicolectomy  S/P cholecystectomy  AICD (Automatic Cardioverter/Defibrillator) Present: inserted in Aug, 2008. Due for battery change in 1 month. ( [employer]) . Inserted by Dr Duffy        Physical Exam:  General: NAD, resting comfortably in bed  Pulmonary: Nonlabored breathing, no respiratory distress  Cardiovascular: NSR  Abdominal: soft, NT/ND, L drain putting out purulent output  Extremities: WWP      LABS:                        7.2    8.51  )-----------( 253      ( 13 Sep 2019 10:16 )             23.8     09-13    132<L>  |  96  |  18  ----------------------------<  190<H>  4.2   |  22  |  0.70    Ca    8.1<L>      13 Sep 2019 07:28  Phos  3.6     09-13  Mg     2.0     09-13    TPro  6.7  /  Alb  2.7<L>  /  TBili  0.3  /  DBili  x   /  AST  9<L>  /  ALT  7<L>  /  AlkPhos  126<H>  09-12    PT/INR - ( 13 Sep 2019 09:44 )   PT: 17.9 sec;   INR: 1.54 ratio         PTT - ( 13 Sep 2019 09:44 )  PTT:35.0 sec  CAPILLARY BLOOD GLUCOSE      POCT Blood Glucose.: 51 mg/dL (13 Sep 2019 22:15)  POCT Blood Glucose.: 58 mg/dL (13 Sep 2019 21:58)  POCT Blood Glucose.: 60 mg/dL (13 Sep 2019 21:51)  POCT Blood Glucose.: 92 mg/dL (13 Sep 2019 18:08)  POCT Blood Glucose.: 95 mg/dL (13 Sep 2019 12:46)  POCT Blood Glucose.: 81 mg/dL (13 Sep 2019 00:50)      Radiology and Additional Studies:    Assessment:  The patient is a 74y Female who is now several hours post-op from a Drain upsize to 16 French    Plan:  - Pain control as needed  - soft diet  - DVT ppx  - OOB and ambulating as tolerated  - F/u AM labs

## 2019-09-13 NOTE — PHYSICAL THERAPY INITIAL EVALUATION ADULT - IMPAIRMENTS FOUND, PT EVAL
gait, locomotion, and balance/aerobic capacity/endurance/muscle strength gait, locomotion, and balance/integumentary integrity/muscle strength/aerobic capacity/endurance

## 2019-09-13 NOTE — H&P ADULT - ASSESSMENT
73 yo Female with PMH of asthma, A fib, dm, HFrEF, pacemaker/AICD, perforated diverticulitis s/p L hemicolectomy and end colostomy w/ multiple washouts and abdominal wall closure w/ strattice (last surgery on 7/11), presents from [place] due to possible dislodgement of IR drain and increased purulent output from her abdomen. CT scan showing increase in size of L paracolic gutter fluid collection, however drains appear in place.    -admit to Dr. Alvarado  -local wound care daily  -IR consultation in am for tube check, possible upsizing of drain  -will keep NPO for possible IR procedure and hold AC for tonight  -continue home medications  -strict I's/O's  -gentle IVF as pt w/o HFrEF  -heart failure consult in am as pt has been followed by team in past  -am labs    Discussed w/ Dr. Christiano [facility], [id number] 73 yo Female with PMH of asthma, A fib, dm, HFrEF, pacemaker/AICD, perforated diverticulitis s/p L hemicolectomy and end colostomy w/ multiple washouts and abdominal wall closure w/ strattice (last surgery on 7/11), presents from [place] due to possible dislodgement of IR drain and increased purulent output from her abdomen. CT scan showing increase in size of L paracolic gutter fluid collection, however drains appear in place.    -admit to Dr. Alvarado  -local wound care daily  -IR consultation in am for tube check, possible upsizing of drain  -will keep NPO for possible IR procedure and hold AC for tonight  -continue home medications  -strict I's/O's  -gentle IVF as pt w/o HFrEF  -heart failure consult in am as pt has been followed by team in past  -will continue IV abx  -am labs    Discussed w/ Dr. Christiano [facility], [id number] 73 yo Female with PMH of asthma, A fib, dm, HFrEF, pacemaker/AICD, perforated diverticulitis s/p L hemicolectomy and end colostomy w/ multiple washouts and abdominal wall closure w/ strattice (last surgery on 7/11), presents from [place] due to possible dislodgement of IR drain and increased purulent output from her abdomen. CT scan showing increase in size of L paracolic gutter fluid collection, however drains appear in place.    -admit to Dr. Alvarado  -local wound care daily  -IR consultation in am for tube check, possible upsizing of drain  -holding lasix for now, will reevaluate in am. Continuing spironolactone  -will keep NPO for possible IR procedure and hold AC for tonight  -continue home medications  -strict I's/O's  -gentle IVF while pt NPO as pt w/o HFrEF  -heart failure consult in am as pt has been followed by team in past  -will continue IV abx  -am labs    Discussed w/ Dr. Alvarado  [facility], [id number] 75 yo Female with PMH of asthma, A fib, dm, HFrEF, pacemaker/AICD, perforated diverticulitis s/p L hemicolectomy and end colostomy w/ multiple washouts and abdominal wall closure w/ strattice (last surgery on 7/11), presents from [place] due to possible dislodgement of IR drain and increased purulent output from her abdomen. CT scan showing increase in size of L paracolic gutter fluid collection, however drains appear in place.    -admit to Dr. Alvarado  -local wound care daily  -IR consultation in am for tube check, possible upsizing of drain  -will keep NPO for possible IR procedure and hold AC for tonight  -continue home medications  -strict I's/O's  -gentle IVF while pt NPO as pt w/o HFrEF  -heart failure consult in am as pt has been followed by team in past  -will continue IV abx  -am labs    Discussed w/ Dr. Alvarado  [facility], [id number]

## 2019-09-13 NOTE — DISCHARGE NOTE PROVIDER - NSDCCPCAREPLAN_GEN_ALL_CORE_FT
PRINCIPAL DISCHARGE DIAGNOSIS  Diagnosis: Wound infection  Assessment and Plan of Treatment: WOUND CARE: Please cover midline wound with aquacel, gauze, ABD, and paper tape and change daily.  BATHING: Please do not submerge wound underwater. You may shower and/or sponge bathe.  ACTIVITY: No heavy lifting anything more than 10-15lbs or straining. Otherwise, you may return to your usual level of physical activity. If you are taking narcotic pain medication (such as Percocet), do NOT drive a car, operate machinery or make important decisions.  DIET: Regular diet as tolerated  NOTIFY YOUR SURGEON IF: You have any bleeding that does not stop, any pus draining from your wound, any fever (over 100.4 F) or chills, persistent nausea/vomiting with inability to tolerate food or liquids, persistent diarrhea, or if your pain is not controlled on your discharge pain medications.  FOLLOW-UP:  1. Please call to make a follow-up appointment within one week of discharge   2. Please follow up with your primary care physician in one week regarding your hospitalization. PRINCIPAL DISCHARGE DIAGNOSIS  Diagnosis: Wound infection  Assessment and Plan of Treatment: Please coniuue IV antibiotics and follow up as outpatient with repeat imaging. PLease flush drain with 5cc steriel water foward flush only.   WOUND CARE: Please cover midline wound with aquacel, gauze, ABD, and paper tape and change daily.  BATHING: Please do not submerge wound underwater. You may shower and/or sponge bathe.  ACTIVITY: No heavy lifting anything more than 10-15lbs or straining. Otherwise, you may return to your usual level of physical activity. If you are taking narcotic pain medication (such as Percocet), do NOT drive a car, operate machinery or make important decisions.  DIET: Regular diet as tolerated  NOTIFY YOUR SURGEON IF: You have any bleeding that does not stop, any pus draining from your wound, any fever (over 100.4 F) or chills, persistent nausea/vomiting with inability to tolerate food or liquids, persistent diarrhea, or if your pain is not controlled on your discharge pain medications.  FOLLOW-UP:  1. Please call to make a follow-up appointment within one week of discharge   2. Please follow up with your primary care physician in one week regarding your hospitalization. PRINCIPAL DISCHARGE DIAGNOSIS  Diagnosis: Wound infection  Assessment and Plan of Treatment: Infectious Disease dr. Llanos. Recommend CBC with Diff and CMP qWeekly while on antimicrobials. Please have results faxed to [phone]. Please coniuue IV antibiotics and follow up as outpatient with repeat imaging. Call [phone].  Interventional Radiology in one week., Dr. Walker.  Call their office [phone] for appointment, and schedule necessary imaging ie: CT scan of abdomen and pelvis if necessary, prior to follow up.  Please discuss with their office when any necessary imaging should be scheduled, when calling to schedule your follow up office appointment. Please flush drain with 5cc sterile water foward flush only.   WOUND CARE: Please cover midline wound with aquacel, gauze, ABD, and paper tape and change daily.  BATHING: Please do not submerge wound underwater. You may shower and/or sponge bathe.  ACTIVITY: No heavy lifting anything more than 10-15lbs or straining. Otherwise, you may return to your usual level of physical activity. If you are taking narcotic pain medication (such as Percocet), do NOT drive a car, operate machinery or make important decisions.  DIET: Regular diet as tolerated  NOTIFY YOUR SURGEON IF: You have any bleeding that does not stop, any pus draining from your wound, any fever (over 100.4 F) or chills, persistent nausea/vomiting with inability to tolerate food or liquids, persistent diarrhea, or if your pain is not controlled on your discharge pain medications.  FOLLOW-UP:  1. Please call to make a follow-up appointment within one week of discharge   2. Please follow up with your primary care physician in one week regarding your hospitalization. PRINCIPAL DISCHARGE DIAGNOSIS  Diagnosis: Wound infection  Assessment and Plan of Treatment: Infectious Disease Dr. Llanos in one to two weeks Recommend CBC with Diff and CMP qWeekly while on antimicrobials. Please have results faxed to [phone]. Please continue IV antibiotics and follow up as outpatient with repeat CT  iscan of abdomen. Call [phone] for Infectious Disease appointment. Continiue your IV antibiotics and discuss at your follow up the duration.  Interventional Radiology in one to two weeks., Dr. Walker.  Call their office [phone] for appointment, and schedule necessary imaging ie: CT scan of abdomen and pelvis if necessary, prior to follow up.  Please discuss with their office when any necessary imaging should be scheduled, when calling to schedule your follow up office appointment. Please flush drain with 5cc sterile water foward flush only.  WOUND CARE: Xeroform every other day to abdominal graft site, covered with ABD or gauze.   ~Ostomy pouch for fistula management per ostomy nurses (approximately q5d changes)  ~Leave xeroform on donor right thigh site until follow up visit with Dr. Perez (1 week) but PLEASE change ABD every day. It adheres to patient and she is very uncomfortable  if it is on for more than one day  BATHING: Please do not submerge wound underwater. You may shower and/or sponge bathe.  ACTIVITY: No heavy lifting anything more than 10-15lbs or straining. Otherwise, you may return to your usual level of physical activity. If you are taking narcotic pain medication (such as Percocet), do NOT drive a car, operate machinery or make important decisions.  DIET: Regular diet as tolerated  NOTIFY YOUR SURGEON IF: You have any bleeding that does not stop, any pus draining from your wound, any fever (over 100.4 F) or chills, persistent nausea/vomiting with inability to tolerate food or liquids, persistent diarrhea, or if your pain is not controlled on your discharge pain medications.

## 2019-09-13 NOTE — PHYSICAL THERAPY INITIAL EVALUATION ADULT - PLANNED THERAPY INTERVENTIONS, PT EVAL
balance training/gait training/strengthening/bed mobility training/transfer training Negative Pressure Wound Therapy/strengthening/bed mobility training/balance training/transfer training/gait training

## 2019-09-13 NOTE — CONSULT NOTE ADULT - SUBJECTIVE AND OBJECTIVE BOX
ANDRE BARKER  74y  Female [id number]    GYN requested for evaluation of lesions on labia.    HPI:  73 yo Female with PMH of asthma, A fib, dm, HFrEF, pacemaker/AICD, perforated diverticulitis s/p L hemicolectomy and end colostomy w/ multiple washouts and abdominal wall closure w/ strattice (last surgery on 7/11), presents due to possible dislodgement of IR drain and increased purulent output from her abdomen. GYN consulted for lesions on vagina. Patient seen and evaluated at bedside. Patient has no acute gynecologic complaints at this time. States she hasn't had vaginal bleeding since she hit menopause, though she is unsure when that was. She denies itching, vaginal discharge. Patient is not currently sexually active.       Name of GYN Physician: Unsure     POB:  NSVDx2    Pgyn: Denies fibroids, cysts, endometriosis, STI's, Abnormal pap smears     Home meds:     Hospital Meds:   MEDICATIONS  (STANDING):  atorvastatin 20 milliGRAM(s) Oral at bedtime  dextrose 5%. 1000 milliLiter(s) (50 mL/Hr) IV Continuous <Continuous>  dextrose 50% Injectable 12.5 Gram(s) IV Push once  dextrose 50% Injectable 25 Gram(s) IV Push once  dextrose 50% Injectable 25 Gram(s) IV Push once  diphenhydrAMINE   Injectable 50 milliGRAM(s) IV Push once  enalapril 5 milliGRAM(s) Oral daily  ertapenem  IVPB 1000 milliGRAM(s) IV Intermittent every 24 hours  famotidine    Tablet 20 milliGRAM(s) Oral daily  furosemide    Tablet 40 milliGRAM(s) Oral daily  insulin lispro (HumaLOG) corrective regimen sliding scale   SubCutaneous every 6 hours  magnesium oxide 400 milliGRAM(s) Oral three times a day with meals  montelukast 10 milliGRAM(s) Oral daily  sodium chloride 0.9%. 1000 milliLiter(s) (30 mL/Hr) IV Continuous <Continuous>  spironolactone 25 milliGRAM(s) Oral daily  tiotropium 18 MICROgram(s) Capsule 1 Capsule(s) Inhalation daily    MEDICATIONS  (PRN):  dextrose 40% Gel 15 Gram(s) Oral once PRN Blood Glucose LESS THAN 70 milliGRAM(s)/deciliter  glucagon  Injectable 1 milliGRAM(s) IntraMuscular once PRN Glucose LESS THAN 70 milligrams/deciliter      Allergies    Coreg (Other)  digoxin (Other; Short breath (Mild to Mod))  IV Contrast (Unknown)  penicillins (Hives)    Intolerances    metoprolol (Other)  Solu-Medrol (Other (Mild to Mod))      PAST MEDICAL & SURGICAL HISTORY:  Refusal of blood transfusions as patient is [place]  Patient is [place]  Vertigo  Atrial flutter  Diverticulitis  Cardiomyopathy  Kidney stone  Cardiac Pacemaker  HTN - Hypertension  Gout  Diabetes  Congestive Heart Failure  Asthma  Status post left hemicolectomy  S/P cholecystectomy  AICD (Automatic Cardioverter/Defibrillator) Present: inserted in Aug, 2008. Due for battery change in 1 month. ( [employer]) . Inserted by Dr Duffy      FAMILY HISTORY:  Family history of brain tumor      Social History:  Denies smoking use, drug use, alcohol use.     Vital Signs Last 24 Hrs  T(C): 36.3 (13 Sep 2019 08:30), Max: 36.7 (12 Sep 2019 19:56)  T(F): 97.3 (13 Sep 2019 08:30), Max: 98 (12 Sep 2019 19:56)  HR: 101 (13 Sep 2019 12:50) (64 - 101)  BP: 114/74 (13 Sep 2019 12:50) (101/58 - 127/81)  BP(mean): --  RR: 18 (13 Sep 2019 08:30) (16 - 18)  SpO2: 98% (13 Sep 2019 12:50) (91% - 100%)    Physical Exam:   General: sitting comfortably in bed, NAD   :  No bleeding on pad.    External labia with 2 2mm wartlike lesions.    Speculum exam: Deferred due to limited mobility      LABS:                              7.2    8.51  )-----------( 253      ( 13 Sep 2019 10:16 )             23.8     09-13    132<L>  |  96  |  18  ----------------------------<  190<H>  4.2   |  22  |  0.70    Ca    8.1<L>      13 Sep 2019 07:28  Phos  3.6     09-13  Mg     2.0     09-13    TPro  6.7  /  Alb  2.7<L>  /  TBili  0.3  /  DBili  x   /  AST  9<L>  /  ALT  7<L>  /  AlkPhos  126<H>  09-12    I&O's Detail    13 Sep 2019 07:01  -  13 Sep 2019 15:26  --------------------------------------------------------  IN:    IV PiggyBack: 50 mL    sodium chloride 0.9%.: 90 mL  Total IN: 140 mL    OUT:  Total OUT: 0 mL    Total NET: 140 mL        PT/INR - ( 13 Sep 2019 09:44 )   PT: 17.9 sec;   INR: 1.54 ratio         PTT - ( 13 Sep 2019 09:44 )  PTT:35.0 sec

## 2019-09-13 NOTE — H&P ADULT - NSICDXPASTSURGICALHX_GEN_ALL_CORE_FT
PAST SURGICAL HISTORY:  AICD (Automatic Cardioverter/Defibrillator) Present inserted in Aug, 2008. Due for battery change in 1 month. ( [employer]) . Inserted by Dr Duffy    S/P cholecystectomy     Status post left hemicolectomy

## 2019-09-13 NOTE — PHYSICAL THERAPY INITIAL EVALUATION ADULT - ADDITIONAL COMMENTS
CT scan showing increase in size of L paracolic gutter fluid collection, however drains appear in place    [facility] reports residing in a private home with daughter and son-in-law with 5-6 steps to enter +HR assist. Patient states that she owns her own RW and single-axis cane and she would ambulate with AD occasionally as needed. She reports being functionally independent and performing ADLs independently at home.

## 2019-09-13 NOTE — DISCHARGE NOTE PROVIDER - NSDCFUADDAPPT_GEN_ALL_CORE_FT
F/U with Dr. Perez (Plastic Surgeon) in 1-2 weeks  Follow up with Dr. Goldberg (Hematology/Oncology) for further monitoring/workup of anemia F/U with Dr. Perez (Plastic Surgeon) in 1-2 weeks  Follow up with Dr. Goldberg (Hematology/Oncology) for further monitoring/workup of anemia  Follow up with Dr. Neal (Interventional Radiology) in 1-2 weeks for tube check when output is less than 10 cc/24 hrs. Follow up with Dr. Perez (Plastic Surgeon) in 1 week.  Call their office [phone]  for appointment.  Follow up with Dr. Goldberg (Hematology/Oncology) for further monitoring/workup of anemia  Follow up with Dr. Neal (Interventional Radiology) in 1-2 weeks for tube check when output is less than 10 cc/24 hrs.  Follow up Dr. Goode, Infectious Diseases.  Call [phone].  Follow up with Dr. Alvarado, in two weeks. Call [phone]

## 2019-09-13 NOTE — DISCHARGE NOTE PROVIDER - PROVIDER TOKENS
PROVIDER:[TOKEN:[[id number]],FOLLOWUP:[1 month]] PROVIDER:[TOKEN:[[id number]],FOLLOWUP:[1 month]],PROVIDER:[TOKEN:[[id number]]],PROVIDER:[TOKEN:[[id number]]] PROVIDER:[TOKEN:[[id number]],FOLLOWUP:[1 month]],PROVIDER:[TOKEN:[[id number]],FOLLOWUP:[2 weeks]],PROVIDER:[TOKEN:[[id number]],FOLLOWUP:[1 week]],PROVIDER:[TOKEN:[[id number]],FOLLOWUP:[2 weeks]] PROVIDER:[TOKEN:[[id number]],FOLLOWUP:[1 month]],PROVIDER:[TOKEN:[[id number]],FOLLOWUP:[2 weeks]],PROVIDER:[TOKEN:[[id number]],FOLLOWUP:[1 week]],PROVIDER:[TOKEN:[[id number]],FOLLOWUP:[2 weeks]],PROVIDER:[TOKEN:[[id number]]]

## 2019-09-13 NOTE — PHYSICAL THERAPY INITIAL EVALUATION ADULT - GENERAL OBSERVATIONS, REHAB EVAL
*On Contact Precautions* Pt rec'd semi-supine in NAD, +IVL, +PICC R UE, 3L O2 via NC, +2 pigtail drains, +colostomy, VSS.

## 2019-09-14 LAB
-  COAGULASE NEGATIVE STAPHYLOCOCCUS: SIGNIFICANT CHANGE UP
ANION GAP SERPL CALC-SCNC: 12 MMOL/L — SIGNIFICANT CHANGE UP (ref 5–17)
BUN SERPL-MCNC: 15 MG/DL — SIGNIFICANT CHANGE UP (ref 7–23)
CALCIUM SERPL-MCNC: 8 MG/DL — LOW (ref 8.4–10.5)
CHLORIDE SERPL-SCNC: 102 MMOL/L — SIGNIFICANT CHANGE UP (ref 96–108)
CO2 SERPL-SCNC: 25 MMOL/L — SIGNIFICANT CHANGE UP (ref 22–31)
CREAT SERPL-MCNC: 0.65 MG/DL — SIGNIFICANT CHANGE UP (ref 0.5–1.3)
GLUCOSE SERPL-MCNC: 73 MG/DL — SIGNIFICANT CHANGE UP (ref 70–99)
GRAM STN FLD: SIGNIFICANT CHANGE UP
HCT VFR BLD CALC: 23.2 % — LOW (ref 34.5–45)
HGB BLD-MCNC: 7 G/DL — CRITICAL LOW (ref 11.5–15.5)
MAGNESIUM SERPL-MCNC: 1.8 MG/DL — SIGNIFICANT CHANGE UP (ref 1.6–2.6)
MCHC RBC-ENTMCNC: 30.1 PG — SIGNIFICANT CHANGE UP (ref 27–34)
MCHC RBC-ENTMCNC: 30.2 GM/DL — LOW (ref 32–36)
MCV RBC AUTO: 99.6 FL — SIGNIFICANT CHANGE UP (ref 80–100)
METHOD TYPE: SIGNIFICANT CHANGE UP
PHOSPHATE SERPL-MCNC: 2 MG/DL — LOW (ref 2.5–4.5)
PLATELET # BLD AUTO: 256 K/UL — SIGNIFICANT CHANGE UP (ref 150–400)
POTASSIUM SERPL-MCNC: 3.9 MMOL/L — SIGNIFICANT CHANGE UP (ref 3.5–5.3)
POTASSIUM SERPL-SCNC: 3.9 MMOL/L — SIGNIFICANT CHANGE UP (ref 3.5–5.3)
RBC # BLD: 2.33 M/UL — LOW (ref 3.8–5.2)
RBC # FLD: 19.3 % — HIGH (ref 10.3–14.5)
SODIUM SERPL-SCNC: 139 MMOL/L — SIGNIFICANT CHANGE UP (ref 135–145)
WBC # BLD: 8.5 K/UL — SIGNIFICANT CHANGE UP (ref 3.8–10.5)
WBC # FLD AUTO: 8.5 K/UL — SIGNIFICANT CHANGE UP (ref 3.8–10.5)

## 2019-09-14 RX ORDER — SODIUM,POTASSIUM PHOSPHATES 278-250MG
1 POWDER IN PACKET (EA) ORAL
Refills: 0 | Status: COMPLETED | OUTPATIENT
Start: 2019-09-14 | End: 2019-09-15

## 2019-09-14 RX ORDER — APIXABAN 2.5 MG/1
5 TABLET, FILM COATED ORAL EVERY 12 HOURS
Refills: 0 | Status: DISCONTINUED | OUTPATIENT
Start: 2019-09-14 | End: 2019-09-20

## 2019-09-14 RX ORDER — SODIUM CHLORIDE 9 MG/ML
1000 INJECTION, SOLUTION INTRAVENOUS
Refills: 0 | Status: DISCONTINUED | OUTPATIENT
Start: 2019-09-14 | End: 2019-09-17

## 2019-09-14 RX ORDER — CHLORHEXIDINE GLUCONATE 213 G/1000ML
1 SOLUTION TOPICAL DAILY
Refills: 0 | Status: DISCONTINUED | OUTPATIENT
Start: 2019-09-14 | End: 2019-10-04

## 2019-09-14 RX ADMIN — NYSTATIN CREAM 1 APPLICATION(S): 100000 CREAM TOPICAL at 06:18

## 2019-09-14 RX ADMIN — FAMOTIDINE 20 MILLIGRAM(S): 10 INJECTION INTRAVENOUS at 11:26

## 2019-09-14 RX ADMIN — ERTAPENEM SODIUM 120 MILLIGRAM(S): 1 INJECTION, POWDER, LYOPHILIZED, FOR SOLUTION INTRAMUSCULAR; INTRAVENOUS at 10:03

## 2019-09-14 RX ADMIN — Medication 0: at 22:44

## 2019-09-14 RX ADMIN — ATORVASTATIN CALCIUM 20 MILLIGRAM(S): 80 TABLET, FILM COATED ORAL at 22:43

## 2019-09-14 RX ADMIN — CHLORHEXIDINE GLUCONATE 1 APPLICATION(S): 213 SOLUTION TOPICAL at 12:30

## 2019-09-14 RX ADMIN — SODIUM CHLORIDE 30 MILLILITER(S): 9 INJECTION, SOLUTION INTRAVENOUS at 22:43

## 2019-09-14 RX ADMIN — MONTELUKAST 10 MILLIGRAM(S): 4 TABLET, CHEWABLE ORAL at 17:39

## 2019-09-14 RX ADMIN — Medication 1 PACKET(S): at 17:40

## 2019-09-14 RX ADMIN — MAGNESIUM OXIDE 400 MG ORAL TABLET 400 MILLIGRAM(S): 241.3 TABLET ORAL at 11:26

## 2019-09-14 RX ADMIN — TIOTROPIUM BROMIDE 1 CAPSULE(S): 18 CAPSULE ORAL; RESPIRATORY (INHALATION) at 11:26

## 2019-09-14 RX ADMIN — APIXABAN 5 MILLIGRAM(S): 2.5 TABLET, FILM COATED ORAL at 17:39

## 2019-09-14 RX ADMIN — MAGNESIUM OXIDE 400 MG ORAL TABLET 400 MILLIGRAM(S): 241.3 TABLET ORAL at 10:03

## 2019-09-14 RX ADMIN — MAGNESIUM OXIDE 400 MG ORAL TABLET 400 MILLIGRAM(S): 241.3 TABLET ORAL at 17:39

## 2019-09-14 RX ADMIN — NYSTATIN CREAM 1 APPLICATION(S): 100000 CREAM TOPICAL at 17:40

## 2019-09-14 NOTE — PROGRESS NOTE ADULT - ASSESSMENT
73 yo Female with PMH of asthma, A fib, dm, HFrEF, pacemaker/AICD, perforated diverticulitis s/p L hemicolectomy and end colostomy w/ multiple washouts and abdominal wall closure w/ strattice (last surgery on 7/11), presents from [place] due to possible dislodgement of IR drain and increased purulent output from her abdomen. CT scan showing increase in size of L paracolic gutter fluid collection, however drains appear in place.      -local wound care daily  - monitor drain output to ensure it is not occluded  -resume diet, restart A/C  -continue home medications  -strict I's/O's  -gentle IVF while pt NPO as pt w/o HFrEF  -heart failure consult as pt has been followed by team in past  -will continue IV abx  - per OBGYN, no interventions for labial lesion: warm compress  -per ID, continue ertapenem q24hr      [facility], [id number] 75 yo Female with PMH of asthma, A fib, dm, HFrEF, pacemaker/AICD, perforated diverticulitis s/p L hemicolectomy and end colostomy w/ multiple washouts and abdominal wall closure w/ strattice (last surgery on 7/11), presents from [place] due to possible dislodgement of IR drain and increased purulent output from her abdomen. CT scan showing increase in size of L paracolic gutter fluid collection, however drains appear in place. POD 1 s/p IR upsizing.      -local wound care daily  - monitor drain output to ensure it is not occluded  -resume diet, restart A/C  -continue home medications  -strict I's/O's  -gentle IVF while pt NPO as pt w/o HFrEF  -heart failure consult as pt has been followed by team in past  -will continue IV abx  - per OBGYN, no interventions for labial lesion: warm compress  -per ID, continue ertapenem q24hr      [facility], [id number]

## 2019-09-14 NOTE — PROVIDER CONTACT NOTE (CRITICAL VALUE NOTIFICATION) - BACKGROUND
Pt is a [employer] & refusing all blood products
s/p drain dislodgement, IR-> drain replacement. s/p mult. abd washouts w/ closure (7/11)

## 2019-09-14 NOTE — PROGRESS NOTE ADULT - SUBJECTIVE AND OBJECTIVE BOX
Overnight: hypoglycemic to 50, given IVP dextrose 12.5 and resolved to 141. POD 1 s/p IR upsizing of drain    SUBJECTIVE:  Reports no abdominal pain  Denies nausea, vomiting, diarrhea  Is passing gas and having bowel movements in ostomy  Tolerating soft diet  Ambulating independently    OBJECTIVE:  Vital Signs Last 24 Hrs  T(C): 36.4 (14 Sep 2019 05:25), Max: 36.8 (13 Sep 2019 18:09)  T(F): 97.5 (14 Sep 2019 05:25), Max: 98.3 (13 Sep 2019 18:09)  HR: 91 (14 Sep 2019 05:25) (81 - 101)  BP: 103/57 (14 Sep 2019 05:25) (103/57 - 134/67)  BP(mean): --  RR: 17 (14 Sep 2019 05:25) (17 - 18)  SpO2: 97% (14 Sep 2019 05:25) (94% - 100%)    I&O's Detail    13 Sep 2019 07:01  -  14 Sep 2019 05:32  --------------------------------------------------------  IN:    IV PiggyBack: 50 mL    Oral Fluid: 100 mL    sodium chloride 0.9%.: 300 mL  Total IN: 450 mL    OUT:    Colostomy: 1 mL    Drain: 1 mL    Drain: 30 mL  Total OUT: 32 mL    Total NET: 418 mL          Inpatient Medications:  MEDICATIONS  (STANDING):  atorvastatin 20 milliGRAM(s) Oral at bedtime  dextrose 5%. 1000 milliLiter(s) (50 mL/Hr) IV Continuous <Continuous>  dextrose 50% Injectable 12.5 Gram(s) IV Push once  dextrose 50% Injectable 25 Gram(s) IV Push once  dextrose 50% Injectable 25 Gram(s) IV Push once  enalapril 5 milliGRAM(s) Oral daily  ertapenem  IVPB 1000 milliGRAM(s) IV Intermittent every 24 hours  famotidine    Tablet 20 milliGRAM(s) Oral daily  furosemide    Tablet 40 milliGRAM(s) Oral daily  insulin lispro (HumaLOG) corrective regimen sliding scale   SubCutaneous three times a day before meals  insulin lispro (HumaLOG) corrective regimen sliding scale   SubCutaneous at bedtime  magnesium oxide 400 milliGRAM(s) Oral three times a day with meals  montelukast 10 milliGRAM(s) Oral daily  nystatin Powder 1 Application(s) Topical two times a day  sodium chloride 0.9%. 1000 milliLiter(s) (30 mL/Hr) IV Continuous <Continuous>  spironolactone 25 milliGRAM(s) Oral daily  tiotropium 18 MICROgram(s) Capsule 1 Capsule(s) Inhalation daily    MEDICATIONS  (PRN):  dextrose 40% Gel 15 Gram(s) Oral once PRN Blood Glucose LESS THAN 70 milliGRAM(s)/deciliter  glucagon  Injectable 1 milliGRAM(s) IntraMuscular once PRN Glucose LESS THAN 70 milligrams/deciliter      Physical Exam: General: well developed, NAD  	Neuro: alert and oriented, no focal deficits, moves all extremities spontaneously  	Respiratory: airway patent, respirations unlabored  	CVS: regular rate and rhythm  	Abdomen: soft, midline abdominal wound dressing c/d/i ostomy pink/viable, two IR drain sites w/ purulent drainage, more medial drain site w/ purulent drainage at drain site, Abdominal wound healing well with granulation tissue covering  	Extremities: no edema, sensation and movement grossly intact      LABS:                        7.2    8.51  )-----------( 253      ( 13 Sep 2019 10:16 )             23.8       09-13    132<L>  |  96  |  18  ----------------------------<  190<H>  4.2   |  22  |  0.70    Ca    8.1<L>      13 Sep 2019 07:28  Phos  3.6     09-13  Mg     2.0     09-13    TPro  6.7  /  Alb  2.7<L>  /  TBili  0.3  /  DBili  x   /  AST  9<L>  /  ALT  7<L>  /  AlkPhos  126<H>  09-12      CULTURES:  .Blood  09-12 @ 22:49   No growth to date.  --  --      BLOOD PERIPHERAL  09-03 @ 07:17 --    NO ORGANISMS ISOLATED  --      .Blood  08-30 @ 18:43   No growth at 5 days.  --  --      .Surgical Swab  08-30 @ 18:42   Few Escherichia coli  Rare Enterococcus faecalis  Rare Candida albicans "Susceptibilities not performed"  --  Escherichia coli  Enterococcus faecalis      .Blood  08-30 @ 18:00   No growth at 5 days.  --  --      .Body Fluid right abdomen wall  08-16 @ 15:21   No growth at 5 days  --    Few polymorphonuclear leukocytes per low power field  No organisms seen per oil power field      .Body Fluid left retroperitoneal  08-16 @ 15:18   Numerous Escherichia coli  Numerous Alpha hemolytic strep "Susceptibilities not performed"  Moderate Bacteroides thetaiotaomicron "Susceptibilities not performed"  Few Enterococcus faecalis  --  Escherichia coli  Enterococcus faecalis      .Blood  08-16 @ 13:08   No growth at 5 days.  --  --      .Blood  08-14 @ 17:44   Growth in aerobic bottle: Coag Negative Staphylococcus  Single set isolate, possible contaminant. Contact  Microbiology if susceptibility testing clinically  indicated.  "Due to technical problems, Proteus sp. will Not be reported as part of  the BCID panel until further notice"  ***Blood Panel PCR results on this specimen are available  approximately 3 hours after the Gram stain result.***  Gram stain, PCR, and/or culture results may not always  correspond due to difference in methodologies.  ************************************************************  This PCR assay was performed using [employer].  The following targets are tested for: Enterococcus,  vancomycin resistant enterococci, Listeria monocytogenes,  coagulase negative staphylococci, S. aureus,  methicillin resistant S. aureus, Streptococcus agalactiae  (Group B), S. pneumoniae, S. pyogenes (Group A),  Acinetobacter baumannii, Enterobacter cloacae, E. coli,  Klebsiella oxytoca, K. pneumoniae, Proteus sp.,  Serratia marcescens, Haemophilus influenzae,  Neisseria meningitidis, Pseudomonas aeruginosa, Candida  albicans, C. glabrata, C krusei, C parapsilosis,  C. tropicalis and the KPC resistance gene.  --  Blood Culture PCR      .Sputum  07-29 @ 04:17   Moderate Pseudomonas aeruginosa (Carbapenem Resistant) #2  Normal Respiratory Liz present  --  Pseudomonas aeruginosa (Carbapenem Resistant)      .Urine  07-24 @ 01:06   No growth  --  --      .Body Fluid  07-23 @ 13:26   Numerous Escherichia coli  Few Coag Negative Staphylococcus  Numerous Escherichia coli #2  --  Escherichia coli  Escherichia coli      .Blood  07-10 @ 20:09   No growth at 5 days.  --  --      .Blood  07-10 @ 20:08   No growth at 5 days.  --  --      .Body Fluid abdominal fluid  07-09 @ 17:54   Few Enterococcus faecalis  Few Coag Negative Staphylococcus  --  Enterococcus faecalis  Coag Negative Staphylococcus      .Body Fluid abdominal fluid  07-09 @ 17:46   Few Enterococcus faecalis  Few Coag Negative Staphylococcus  --  Enterococcus faecalis  Coag Negative Staphylococcus      .Surgical Swab None Specimen Source:  abdomen, esw  07-02 @ 10:15   Moderate Escherichia coli  Moderate Alpha hemolytic strep "Susceptibilities not performed"  Moderate Strep mitis oralis group "Susceptibilities not performed"  Growth in fluid media only Enterococcus faecalis  --  Escherichia coli  Enterococcus faecalis      .Blood  07-01 @ 10:04   No growth at 5 days.  --  --      .Urine  07-01 @ 10:03   No growth  --  --          IMAGING:  [Interventional Radiology Brief Post Procedure Note    Procedure: Abdominal drain upsize to 16 [place]ian    Operators: Luis Miguel Walker MD    Anesthesia (type): Local lidocaine.    Contrast: None.    EBL: None.    Findings/Follow up Plan of Care: Successful upsize of more posterior drain to 16 Romanian. Pus aspirated and sent for testing.     Specimens Removed: 10 mL purulent material.    Implants: 16 Romanian pigtail catheter.     Complications: No immediate complications.     Condition/Disposition: Back to floors.     Please call Interventional Radiology x [phone] with any questions, concerns, or issues.  ]

## 2019-09-15 LAB
ANION GAP SERPL CALC-SCNC: 10 MMOL/L — SIGNIFICANT CHANGE UP (ref 5–17)
BUN SERPL-MCNC: 9 MG/DL — SIGNIFICANT CHANGE UP (ref 7–23)
CALCIUM SERPL-MCNC: 8.4 MG/DL — SIGNIFICANT CHANGE UP (ref 8.4–10.5)
CHLORIDE SERPL-SCNC: 103 MMOL/L — SIGNIFICANT CHANGE UP (ref 96–108)
CO2 SERPL-SCNC: 25 MMOL/L — SIGNIFICANT CHANGE UP (ref 22–31)
CREAT SERPL-MCNC: 0.51 MG/DL — SIGNIFICANT CHANGE UP (ref 0.5–1.3)
CULTURE RESULTS: SIGNIFICANT CHANGE UP
CULTURE RESULTS: SIGNIFICANT CHANGE UP
GLUCOSE SERPL-MCNC: 72 MG/DL — SIGNIFICANT CHANGE UP (ref 70–99)
GRAM STN FLD: SIGNIFICANT CHANGE UP
HCT VFR BLD CALC: 23.1 % — LOW (ref 34.5–45)
HGB BLD-MCNC: 6.8 G/DL — CRITICAL LOW (ref 11.5–15.5)
MAGNESIUM SERPL-MCNC: 1.8 MG/DL — SIGNIFICANT CHANGE UP (ref 1.6–2.6)
MCHC RBC-ENTMCNC: 29.1 PG — SIGNIFICANT CHANGE UP (ref 27–34)
MCHC RBC-ENTMCNC: 29.4 GM/DL — LOW (ref 32–36)
MCV RBC AUTO: 98.7 FL — SIGNIFICANT CHANGE UP (ref 80–100)
ORGANISM # SPEC MICROSCOPIC CNT: SIGNIFICANT CHANGE UP
ORGANISM # SPEC MICROSCOPIC CNT: SIGNIFICANT CHANGE UP
PHOSPHATE SERPL-MCNC: 1.9 MG/DL — LOW (ref 2.5–4.5)
PLATELET # BLD AUTO: 232 K/UL — SIGNIFICANT CHANGE UP (ref 150–400)
POTASSIUM SERPL-MCNC: 3.9 MMOL/L — SIGNIFICANT CHANGE UP (ref 3.5–5.3)
POTASSIUM SERPL-SCNC: 3.9 MMOL/L — SIGNIFICANT CHANGE UP (ref 3.5–5.3)
RBC # BLD: 2.34 M/UL — LOW (ref 3.8–5.2)
RBC # FLD: 21.2 % — HIGH (ref 10.3–14.5)
SODIUM SERPL-SCNC: 138 MMOL/L — SIGNIFICANT CHANGE UP (ref 135–145)
SPECIMEN SOURCE: SIGNIFICANT CHANGE UP
WBC # BLD: 7.38 K/UL — SIGNIFICANT CHANGE UP (ref 3.8–10.5)
WBC # FLD AUTO: 7.38 K/UL — SIGNIFICANT CHANGE UP (ref 3.8–10.5)

## 2019-09-15 PROCEDURE — 99232 SBSQ HOSP IP/OBS MODERATE 35: CPT

## 2019-09-15 RX ORDER — VANCOMYCIN HCL 1 G
750 VIAL (EA) INTRAVENOUS EVERY 12 HOURS
Refills: 0 | Status: DISCONTINUED | OUTPATIENT
Start: 2019-09-15 | End: 2019-09-18

## 2019-09-15 RX ADMIN — ERTAPENEM SODIUM 120 MILLIGRAM(S): 1 INJECTION, POWDER, LYOPHILIZED, FOR SOLUTION INTRAMUSCULAR; INTRAVENOUS at 10:49

## 2019-09-15 RX ADMIN — CHLORHEXIDINE GLUCONATE 1 APPLICATION(S): 213 SOLUTION TOPICAL at 12:32

## 2019-09-15 RX ADMIN — ATORVASTATIN CALCIUM 20 MILLIGRAM(S): 80 TABLET, FILM COATED ORAL at 21:45

## 2019-09-15 RX ADMIN — APIXABAN 5 MILLIGRAM(S): 2.5 TABLET, FILM COATED ORAL at 18:26

## 2019-09-15 RX ADMIN — FAMOTIDINE 20 MILLIGRAM(S): 10 INJECTION INTRAVENOUS at 12:31

## 2019-09-15 RX ADMIN — MAGNESIUM OXIDE 400 MG ORAL TABLET 400 MILLIGRAM(S): 241.3 TABLET ORAL at 18:25

## 2019-09-15 RX ADMIN — SODIUM CHLORIDE 30 MILLILITER(S): 9 INJECTION, SOLUTION INTRAVENOUS at 05:28

## 2019-09-15 RX ADMIN — NYSTATIN CREAM 1 APPLICATION(S): 100000 CREAM TOPICAL at 05:27

## 2019-09-15 RX ADMIN — MAGNESIUM OXIDE 400 MG ORAL TABLET 400 MILLIGRAM(S): 241.3 TABLET ORAL at 10:49

## 2019-09-15 RX ADMIN — TIOTROPIUM BROMIDE 1 CAPSULE(S): 18 CAPSULE ORAL; RESPIRATORY (INHALATION) at 12:31

## 2019-09-15 RX ADMIN — Medication 250 MILLIGRAM(S): at 18:25

## 2019-09-15 RX ADMIN — Medication 62.5 MILLIMOLE(S): at 12:32

## 2019-09-15 RX ADMIN — MAGNESIUM OXIDE 400 MG ORAL TABLET 400 MILLIGRAM(S): 241.3 TABLET ORAL at 12:31

## 2019-09-15 RX ADMIN — APIXABAN 5 MILLIGRAM(S): 2.5 TABLET, FILM COATED ORAL at 05:28

## 2019-09-15 RX ADMIN — SODIUM CHLORIDE 30 MILLILITER(S): 9 INJECTION, SOLUTION INTRAVENOUS at 18:25

## 2019-09-15 RX ADMIN — Medication 1 PACKET(S): at 05:28

## 2019-09-15 RX ADMIN — NYSTATIN CREAM 1 APPLICATION(S): 100000 CREAM TOPICAL at 18:25

## 2019-09-15 RX ADMIN — MONTELUKAST 10 MILLIGRAM(S): 4 TABLET, CHEWABLE ORAL at 12:31

## 2019-09-15 NOTE — PROGRESS NOTE ADULT - ASSESSMENT
73 yo Female with PMH of asthma, A fib, dm, HFrEF, pacemaker/AICD, perforated diverticulitis s/p L hemicolectomy and end colostomy w/ multiple washouts and abdominal wall closure w/ strattice (last surgery on 7/11), presents from [place] due to possible dislodgement of IR drain and increased purulent output from her abdomen. CT scan showing increase in size of L paracolic gutter fluid collection, however drains appear in place. POD 2 s/p IR upsizing, though continued low drain output.       -local wound care daily  -consider another tube check before discharge to ensure positioning i/s/o low output.  -regular diet  -continue home medications including anticoagulation  -strict I's/O's  -gentle IVF while pt NPO as pt w/o HFrEF  -heart failure consult as pt has been followed by team in past  -will continue IV abx  - per OBGYN, no interventions for labial lesion: warm compress  -per ID, continue ertapenem q24hr      [facility], [id number]

## 2019-09-15 NOTE — PROVIDER CONTACT NOTE (CRITICAL VALUE NOTIFICATION) - TEST AND RESULT REPORTED:
Blood Cx dated 9/12 --Preliminary results. Growth in Aerobic bottle. Gram positive cocci in clusters.

## 2019-09-15 NOTE — PROVIDER CONTACT NOTE (CRITICAL VALUE NOTIFICATION) - ACTION/TREATMENT ORDERED:
No further instructions at this time.
MD made aware; as per MD will be rounding on pt & will follow up. No new orders at this time. Pt appears to be asymptomatic at this time.
No new orders at present time.

## 2019-09-15 NOTE — PROGRESS NOTE ADULT - ASSESSMENT
74 year old female PMH of HFrEF, s/p AICD, asthma, HTN, HLD, atrial fibrillation on eliquis, with previous perforated diverticulosis, Hartmans procedure with multiple RTOR for abd washouts. Admitted 6/30 - 8/2 for feculent peritonitis with septic shock secondary to perforated diverticulitis with hospital course c/b HCAP 2/2 CR Pseudomonas. CT A/P 8/14 with new left lower ventral abdominal wall air-containing fluid collection with intraperitoneal communication to the level of the sigmoid colon stump and Additional enlargement of the left paracolic gutter collection. Repeat CT A/P 8/19 with extraluminal contrast consistent with dehiscence of the sigmoid colon stump. IR Drain Cultures with E. coli, AHS and Bacteroides. Presenting with drain dislodgement.     CT with CT A/P (9/12)  with Left hemicolectomy with right lower quadrant colostomy and ventral abdominal wall dehiscence. Slight interval enlargement of the left paracolic gutter fluid collection measuring up to 3 x 3.9 x 10.2 cm (TR x AP x CC)    S/p IR upsize of drain 9/13/19.  Repeat bacterial cultures from drainage with E coli and enterococcus.  Would continue Ertapenem for now (patient's leukocytosis from admission resolved without switch in antibiotics)  Blood cultures now with 2/4 bottles (separate sets) of GPC cocci, one id'd as CoNs.  Pt with PICC line.    Overall, Intraabdominal Abscess, Rectal Stump Leak, Leukocytosis, bacteremia.    --Continue Ertapenem 1g IV Q24H  -- Check repeat blood cultures x 2.  ?PICC infection  -- Add vanco 750 mg IV q12.  -- Check vanco level pre 4th dose. Monitor creatinine.  --Continue to follow temperature curve  --Follow up on preliminary blood cultures  -- If blood cultures do not clear, may need to remove picc line.    D/w surgery    Leti Hansen MD  [phone] (pager)  [phone] (office) 74 year old female PMH of HFrEF, s/p AICD, asthma, HTN, HLD, atrial fibrillation on eliquis, with previous perforated diverticulosis, Hartmans procedure with multiple RTOR for abd washouts. Admitted 6/30 - 8/2 for feculent peritonitis with septic shock secondary to perforated diverticulitis with hospital course c/b HCAP 2/2 CR Pseudomonas. CT A/P 8/14 with new left lower ventral abdominal wall air-containing fluid collection with intraperitoneal communication to the level of the sigmoid colon stump and Additional enlargement of the left paracolic gutter collection. Repeat CT A/P 8/19 with extraluminal contrast consistent with dehiscence of the sigmoid colon stump. IR Drain Cultures with E. coli, AHS and Bacteroides. Presenting with drain dislodgement.     CT with CT A/P (9/12)  with Left hemicolectomy with right lower quadrant colostomy and ventral abdominal wall dehiscence. Slight interval enlargement of the left paracolic gutter fluid collection measuring up to 3 x 3.9 x 10.2 cm (TR x AP x CC)    S/p IR upsize of drain 9/13/19.  Repeat bacterial cultures from drainage with E coli and enterococcus.  Would continue Ertapenem for now (patient's leukocytosis from admission resolved without switch in antibiotics)  Blood cultures now with 2/4 bottles (separate sets) of GPC cocci, one id'd as CoNs.  Pt with PICC line.    Overall, Intraabdominal Abscess, Rectal Stump Leak, Leukocytosis, bacteremia.    --Continue Ertapenem 1g IV Q24H  -- Check repeat blood cultures x 2 first.  ?PICC infection  -- then Add vanco 750 mg IV q12.  -- Check vanco level pre 4th dose. Monitor creatinine.  --Continue to follow temperature curve  --Follow up on preliminary blood cultures  -- If blood cultures do not clear, may need to remove picc line.    D/w surgery    Leti Hansen MD  [phone] (pager)  [phone] (office)

## 2019-09-15 NOTE — PROGRESS NOTE ADULT - SUBJECTIVE AND OBJECTIVE BOX
74yPatient is a 74y old  Female who presents with a chief complaint of Possible dislodgement of IR drain, increased purulent output (15 Sep 2019 00:36)      Interval history:  74 year old Female with PMH asthma, A fib, dm, HFrEF, pacemaker/AICD, perforated diverticulitis s/p L hemicolectomy and end colostomy w/ multiple washouts and abdominal wall closure w/ strattice (last surgery on 7/11), presents from [place] due to possible dislodgement of IR drain and increased purulent output from her abdomen.  Of note, admitted 9/3 - 9/9 with shortness of breath CT C/A/P with no PE or PNA and no interval change in collections. Received corticosteroids for reactive airway disease. Patient discharged again on her course of Ertapenem.     At rehab the top IR drain may have come dislodged. She also reports increased purulent output from the drain and from the drain site. She reports that the pt also had increased purulent output from her abdominal wall during dressing changes. No chills or fevers. Minimal PO intake since discharge. No N/V.  A tube check was performed on 9/6 which showed that her drains were in place at this time.     In bed, no complaints.  Awakens to name.   ROS otherwise negative    Antimicrobials:    ertapenem  IVPB 1000 milliGRAM(s) IV Intermittent every 24 hours    MEDICATIONS  (STANDING):  apixaban 5 every 12 hours  atorvastatin 20 at bedtime  dextrose 40% Gel 15 once PRN  dextrose 50% Injectable 12.5 once  dextrose 50% Injectable 25 once  dextrose 50% Injectable 25 once  enalapril 5 daily  famotidine    Tablet 20 daily  furosemide    Tablet 40 daily  glucagon  Injectable 1 once PRN  insulin lispro (HumaLOG) corrective regimen sliding scale  three times a day before meals  insulin lispro (HumaLOG) corrective regimen sliding scale  at bedtime  montelukast 10 daily  spironolactone 25 daily  tiotropium 18 MICROgram(s) Capsule 1 daily      Vital Signs Last 24 Hrs  T(C): 36.8 (09-15-19 @ 05:20), Max: 36.8 (09-15-19 @ 05:20)  T(F): 98.2 (09-15-19 @ 05:20), Max: 98.2 (09-15-19 @ 05:20)  HR: 93 (09-15-19 @ 05:20) (77 - 94)  BP: 105/65 (09-15-19 @ 05:20) (93/60 - 107/68)  BP(mean): --  RR: 18 (09-15-19 @ 05:20) (16 - 18)  SpO2: 97% (09-15-19 @ 05:20) (94% - 97%)    PHYSICAL EXAMINATION:  General: Alert and Awake, NAD  HEENT: PERRL, EOMI, No subconjunctival hemorrhages, Oropharynx Clear, MMM  Neck: Supple, No SARAHI  Cardiac: RRR, No M/R/G  Resp: CTAB, No Wh/Rh/Ra  Abdomen: NBS, ND, Diffusely tender to palpation worst in LLQ. Abdominal wall defect with inferior aspect with purulent drainage. LLQ drain with purulent drainage, No HSM, No rigidity or guarding  MSK: No LE edema. No stigmata of IE. No evidence of phlebitis. No evidence of synovitis.  : No starr  Skin: No rashes. Skin is warm and dry to the touch.   Neuro: Alert and Awake. CN 2-12 Grossly intact. Moves all four extremities spontaneously.  Psych: Calm, Pleasant, Cooperative  Vasc: RUE PICC line (No surrounding erythema, drainage or tenderness to palpation)                        7.0    8.5   )-----------( 256      ( 14 Sep 2019 07:28 )             23.2   09-15    138  |  103  |  9   ----------------------------<  72  3.9   |  25  |  0.51    Ca    8.4      15 Sep 2019 07:42  Phos  1.9     09-15  Mg     1.8     09-15            RECENT CULTURES:  09-13 @ 22:59  .Abscess  --  --  --    Numerous Escherichia coli  Numerous Enterococcus faecalis  --  Culture - Blood (09.12.19 @ 22:49)    Gram Stain:   Growth in aerobic bottle: Gram Positive Cocci in Clusters    Specimen Source: .Blood    Culture Results:   Growth in aerobic bottle: Gram Positive Cocci in Clusters      --    Culture - Blood (09.12.19 @ 22:49)    -  Coagulase negative Staphylococcus: Detec    Gram Stain:   Growth in anaerobic bottle: Gram Positive Cocci in Clusters    Specimen Source: .Blood    Organism: Blood Culture PCR    Culture Results:   Growth in anaerobic bottle: Gram Positive Cocci in Clusters  "Due to technical problems, Proteus sp. will Not be reported as part of  the BCID panel until further notice"  ***Blood Panel PCR results on this specimen are available  approximately 3 hours after the Gram stain result.***  Gram stain, PCR, and/or culture results may not always  correspond due to difference in methodologies.  ************************************************************  This PCR assay was performed using [employer].  The following targets are tested for: Enterococcus,  vancomycin resistant enterococci, Listeria monocytogenes,  coagulase negative staphylococci, S. aureus,  methicillin resistant S. aureus, Streptococcus agalactiae  (Group B), S. pneumoniae, S. pyogenes (Group A),  Acinetobacter baumannii, Enterobacter cloacae, E. coli,  Klebsiella oxytoca, K. pneumoniae, Proteus sp.,  Serratia marcescens, Haemophilus influenzae,  Neisseria meningitidis, Pseudomonas aeruginosa, Candida  albicans, C. glabrata, C krusei, C parapsilosis,  C. tropicalis and the KPC resistance gene.    Organism Identification: Blood Culture PCR    Method Type: PCR          Radiology:  < from: Xray Chest 1 View- PORTABLE-Urgent (09.13.19 @ 21:34) >  IMPRESSION:   Clear lungs.      < end of copied text >

## 2019-09-15 NOTE — PROGRESS NOTE ADULT - SUBJECTIVE AND OBJECTIVE BOX
No acute events overnight. Spoke to daughter about Ir, drains, and current state of being. Abscess culture from 9/13 prelim result: e-coli and enterococcus.    SUBJECTIVE:  Reports no abdominal pain  Denies nausea, vomiting, diarrhea  Is passing gas and having bowel movements in ostomy  Tolerating soft diet  Ambulating independently      OBJECTIVE:  Vital Signs Last 24 Hrs  T(C): 36.7 (14 Sep 2019 22:15), Max: 36.7 (14 Sep 2019 22:15)  T(F): 98 (14 Sep 2019 22:15), Max: 98 (14 Sep 2019 22:15)  HR: 94 (14 Sep 2019 22:15) (77 - 98)  BP: 105/67 (14 Sep 2019 22:15) (93/60 - 115/72)  BP(mean): --  RR: 18 (14 Sep 2019 22:15) (16 - 18)  SpO2: 97% (14 Sep 2019 22:15) (94% - 99%)    I&O's Detail    13 Sep 2019 07:01  -  14 Sep 2019 07:00  --------------------------------------------------------  IN:    IV PiggyBack: 50 mL    Oral Fluid: 200 mL    sodium chloride 0.9%: 300 mL  Total IN: 550 mL    OUT:    Colostomy: 1 mL    Drain: 1 mL    Drain: 30 mL  Total OUT: 32 mL    Total NET: 518 mL      14 Sep 2019 07:01  -  15 Sep 2019 00:46  --------------------------------------------------------  IN:    Oral Fluid: 720 mL  Total IN: 720 mL    OUT:    Colostomy: 150 mL    Drain: 25 mL    Voided: 150 mL  Total OUT: 325 mL    Total NET: 395 mL          Inpatient Medications:  MEDICATIONS  (STANDING):  apixaban 5 milliGRAM(s) Oral every 12 hours  atorvastatin 20 milliGRAM(s) Oral at bedtime  chlorhexidine 2% Cloths 1 Application(s) Topical daily  dextrose 5% + sodium chloride 0.9%. 1000 milliLiter(s) (30 mL/Hr) IV Continuous <Continuous>  dextrose 5%. 1000 milliLiter(s) (50 mL/Hr) IV Continuous <Continuous>  dextrose 50% Injectable 12.5 Gram(s) IV Push once  dextrose 50% Injectable 25 Gram(s) IV Push once  dextrose 50% Injectable 25 Gram(s) IV Push once  enalapril 5 milliGRAM(s) Oral daily  ertapenem  IVPB 1000 milliGRAM(s) IV Intermittent every 24 hours  famotidine    Tablet 20 milliGRAM(s) Oral daily  furosemide    Tablet 40 milliGRAM(s) Oral daily  insulin lispro (HumaLOG) corrective regimen sliding scale   SubCutaneous three times a day before meals  insulin lispro (HumaLOG) corrective regimen sliding scale   SubCutaneous at bedtime  magnesium oxide 400 milliGRAM(s) Oral three times a day with meals  montelukast 10 milliGRAM(s) Oral daily  nystatin Powder 1 Application(s) Topical two times a day  potassium phosphate / sodium phosphate powder 1 Packet(s) Oral two times a day  spironolactone 25 milliGRAM(s) Oral daily  tiotropium 18 MICROgram(s) Capsule 1 Capsule(s) Inhalation daily    MEDICATIONS  (PRN):  dextrose 40% Gel 15 Gram(s) Oral once PRN Blood Glucose LESS THAN 70 milliGRAM(s)/deciliter  glucagon  Injectable 1 milliGRAM(s) IntraMuscular once PRN Glucose LESS THAN 70 milligrams/deciliter        Physical Exam: General: well developed, NAD  	Neuro: alert and oriented, no focal deficits, moves all extremities spontaneously  	Respiratory: airway patent, respirations unlabored  	CVS: regular rate and rhythm  	Abdomen: soft, midline abdominal wound dressing c/d/i ostomy pink/viable, two IR drain sites w/ purulent drainage, more medial drain site w/ purulent drainage at drain site, Abdominal wound healing well with granulation tissue covering  	Extremities: no edema, sensation and movement grossly intact    LABS:                        7.0    8.5   )-----------( 256      ( 14 Sep 2019 07:28 )             23.2       09-14    139  |  102  |  15  ----------------------------<  73  3.9   |  25  |  0.65    Ca    8.0<L>      14 Sep 2019 07:13  Phos  2.0     09-14  Mg     1.8     09-14        CULTURES:  .Abscess  09-13 @ 22:59   Numerous Escherichia coli  Numerous Enterococcus faecalis  --  --      .Blood  09-12 @ 22:49   No growth to date.  --  Blood Culture PCR      BLOOD PERIPHERAL  09-03 @ 07:17 --    NO ORGANISMS ISOLATED  --      .Blood  08-30 @ 18:43   No growth at 5 days.  --  --      .Surgical Swab  08-30 @ 18:42   Few Escherichia coli  Rare Enterococcus faecalis  Rare Candida albicans "Susceptibilities not performed"  --  Escherichia coli  Enterococcus faecalis      .Blood  08-30 @ 18:00   No growth at 5 days.  --  --      .Body Fluid right abdomen wall  08-16 @ 15:21   No growth at 5 days  --    Few polymorphonuclear leukocytes per low power field  No organisms seen per oil power field      .Body Fluid left retroperitoneal  08-16 @ 15:18   Numerous Escherichia coli  Numerous Alpha hemolytic strep "Susceptibilities not performed"  Moderate Bacteroides thetaiotaomicron "Susceptibilities not performed"  Few Enterococcus faecalis  --  Escherichia coli  Enterococcus faecalis      .Blood  08-16 @ 13:08   No growth at 5 days.  --  --      .Blood  08-14 @ 17:44   Growth in aerobic bottle: Coag Negative Staphylococcus  Single set isolate, possible contaminant. Contact  Microbiology if susceptibility testing clinically  indicated.  "Due to technical problems, Proteus sp. will Not be reported as part of  the BCID panel until further notice"  ***Blood Panel PCR results on this specimen are available  approximately 3 hours after the Gram stain result.***  Gram stain, PCR, and/or culture results may not always  correspond due to difference in methodologies.  ************************************************************  This PCR assay was performed using [employer].  The following targets are tested for: Enterococcus,  vancomycin resistant enterococci, Listeria monocytogenes,  coagulase negative staphylococci, S. aureus,  methicillin resistant S. aureus, Streptococcus agalactiae  (Group B), S. pneumoniae, S. pyogenes (Group A),  Acinetobacter baumannii, Enterobacter cloacae, E. coli,  Klebsiella oxytoca, K. pneumoniae, Proteus sp.,  Serratia marcescens, Haemophilus influenzae,  Neisseria meningitidis, Pseudomonas aeruginosa, Candida  albicans, C. glabrata, C krusei, C parapsilosis,  C. tropicalis and the KPC resistance gene.  --  Blood Culture PCR      .Sputum  07-29 @ 04:17   Moderate Pseudomonas aeruginosa (Carbapenem Resistant) #2  Normal Respiratory Liz present  --  Pseudomonas aeruginosa (Carbapenem Resistant)      .Urine  07-24 @ 01:06   No growth  --  --      .Body Fluid  07-23 @ 13:26   Numerous Escherichia coli  Few Coag Negative Staphylococcus  Numerous Escherichia coli #2  --  Escherichia coli  Escherichia coli      .Blood  07-10 @ 20:09   No growth at 5 days.  --  --      .Blood  07-10 @ 20:08   No growth at 5 days.  --  --      .Body Fluid abdominal fluid  07-09 @ 17:54   Few Enterococcus faecalis  Few Coag Negative Staphylococcus  --  Enterococcus faecalis  Coag Negative Staphylococcus      .Body Fluid abdominal fluid  07-09 @ 17:46   Few Enterococcus faecalis  Few Coag Negative Staphylococcus  --  Enterococcus faecalis  Coag Negative Staphylococcus      .Surgical Swab None Specimen Source:  abdomen, esw  07-02 @ 10:15   Moderate Escherichia coli  Moderate Alpha hemolytic strep "Susceptibilities not performed"  Moderate Strep mitis oralis group "Susceptibilities not performed"  Growth in fluid media only Enterococcus faecalis  --  Escherichia coli  Enterococcus faecalis      .Blood  07-01 @ 10:04   No growth at 5 days.  --  --      .Urine  07-01 @ 10:03   No growth  --  --          IMAGING:  []

## 2019-09-16 LAB
-  AMPICILLIN/SULBACTAM: SIGNIFICANT CHANGE UP
-  CEFAZOLIN: SIGNIFICANT CHANGE UP
-  CLINDAMYCIN: SIGNIFICANT CHANGE UP
-  ERYTHROMYCIN: SIGNIFICANT CHANGE UP
-  GENTAMICIN: SIGNIFICANT CHANGE UP
-  OXACILLIN: SIGNIFICANT CHANGE UP
-  PENICILLIN: SIGNIFICANT CHANGE UP
-  RIFAMPIN: SIGNIFICANT CHANGE UP
-  TETRACYCLINE: SIGNIFICANT CHANGE UP
-  TRIMETHOPRIM/SULFAMETHOXAZOLE: SIGNIFICANT CHANGE UP
-  VANCOMYCIN: SIGNIFICANT CHANGE UP
CULTURE RESULTS: SIGNIFICANT CHANGE UP
FERRITIN SERPL-MCNC: 1221 NG/ML — HIGH (ref 15–150)
FOLATE SERPL-MCNC: 9.3 NG/ML — SIGNIFICANT CHANGE UP
HCT VFR BLD CALC: 26.5 % — LOW (ref 34.5–45)
HGB BLD-MCNC: 7.8 G/DL — LOW (ref 11.5–15.5)
IRON SATN MFR SERPL: 20 % — SIGNIFICANT CHANGE UP (ref 14–50)
IRON SATN MFR SERPL: 41 UG/DL — SIGNIFICANT CHANGE UP (ref 30–160)
MCHC RBC-ENTMCNC: 29.4 PG — SIGNIFICANT CHANGE UP (ref 27–34)
MCHC RBC-ENTMCNC: 29.6 GM/DL — LOW (ref 32–36)
MCV RBC AUTO: 99.3 FL — SIGNIFICANT CHANGE UP (ref 80–100)
METHOD TYPE: SIGNIFICANT CHANGE UP
ORGANISM # SPEC MICROSCOPIC CNT: SIGNIFICANT CHANGE UP
ORGANISM # SPEC MICROSCOPIC CNT: SIGNIFICANT CHANGE UP
PLATELET # BLD AUTO: 219 K/UL — SIGNIFICANT CHANGE UP (ref 150–400)
RBC # BLD: 2.67 M/UL — LOW (ref 3.8–5.2)
RBC # BLD: 2.67 M/UL — LOW (ref 3.8–5.2)
RBC # FLD: 18.9 % — HIGH (ref 10.3–14.5)
RETICS #: 53.4 K/UL — SIGNIFICANT CHANGE UP (ref 25–125)
RETICS/RBC NFR: 2 % — SIGNIFICANT CHANGE UP (ref 0.5–2.5)
SPECIMEN SOURCE: SIGNIFICANT CHANGE UP
TIBC SERPL-MCNC: 204 UG/DL — LOW (ref 220–430)
UIBC SERPL-MCNC: 163 UG/DL — SIGNIFICANT CHANGE UP (ref 110–370)
VIT B12 SERPL-MCNC: >2000 PG/ML — HIGH (ref 232–1245)
WBC # BLD: 6.22 K/UL — SIGNIFICANT CHANGE UP (ref 3.8–10.5)
WBC # FLD AUTO: 6.22 K/UL — SIGNIFICANT CHANGE UP (ref 3.8–10.5)

## 2019-09-16 PROCEDURE — 99223 1ST HOSP IP/OBS HIGH 75: CPT | Mod: GC

## 2019-09-16 PROCEDURE — 99024 POSTOP FOLLOW-UP VISIT: CPT

## 2019-09-16 PROCEDURE — 99232 SBSQ HOSP IP/OBS MODERATE 35: CPT

## 2019-09-16 RX ORDER — ALBUTEROL 90 UG/1
2.5 AEROSOL, METERED ORAL EVERY 6 HOURS
Refills: 0 | Status: DISCONTINUED | OUTPATIENT
Start: 2019-09-16 | End: 2019-10-04

## 2019-09-16 RX ORDER — ERYTHROPOIETIN 10000 [IU]/ML
40000 INJECTION, SOLUTION INTRAVENOUS; SUBCUTANEOUS ONCE
Refills: 0 | Status: COMPLETED | OUTPATIENT
Start: 2019-09-16 | End: 2019-09-17

## 2019-09-16 RX ADMIN — Medication 5 MILLIGRAM(S): at 06:23

## 2019-09-16 RX ADMIN — MAGNESIUM OXIDE 400 MG ORAL TABLET 400 MILLIGRAM(S): 241.3 TABLET ORAL at 18:40

## 2019-09-16 RX ADMIN — ATORVASTATIN CALCIUM 20 MILLIGRAM(S): 80 TABLET, FILM COATED ORAL at 23:10

## 2019-09-16 RX ADMIN — APIXABAN 5 MILLIGRAM(S): 2.5 TABLET, FILM COATED ORAL at 17:29

## 2019-09-16 RX ADMIN — SPIRONOLACTONE 25 MILLIGRAM(S): 25 TABLET, FILM COATED ORAL at 06:23

## 2019-09-16 RX ADMIN — Medication 250 MILLIGRAM(S): at 17:29

## 2019-09-16 RX ADMIN — ERTAPENEM SODIUM 120 MILLIGRAM(S): 1 INJECTION, POWDER, LYOPHILIZED, FOR SOLUTION INTRAMUSCULAR; INTRAVENOUS at 13:04

## 2019-09-16 RX ADMIN — CHLORHEXIDINE GLUCONATE 1 APPLICATION(S): 213 SOLUTION TOPICAL at 13:05

## 2019-09-16 RX ADMIN — APIXABAN 5 MILLIGRAM(S): 2.5 TABLET, FILM COATED ORAL at 06:23

## 2019-09-16 RX ADMIN — FAMOTIDINE 20 MILLIGRAM(S): 10 INJECTION INTRAVENOUS at 13:05

## 2019-09-16 RX ADMIN — TIOTROPIUM BROMIDE 1 CAPSULE(S): 18 CAPSULE ORAL; RESPIRATORY (INHALATION) at 13:06

## 2019-09-16 RX ADMIN — MONTELUKAST 10 MILLIGRAM(S): 4 TABLET, CHEWABLE ORAL at 13:05

## 2019-09-16 RX ADMIN — Medication 40 MILLIGRAM(S): at 06:23

## 2019-09-16 RX ADMIN — NYSTATIN CREAM 1 APPLICATION(S): 100000 CREAM TOPICAL at 18:41

## 2019-09-16 RX ADMIN — Medication 250 MILLIGRAM(S): at 06:24

## 2019-09-16 RX ADMIN — MAGNESIUM OXIDE 400 MG ORAL TABLET 400 MILLIGRAM(S): 241.3 TABLET ORAL at 17:29

## 2019-09-16 RX ADMIN — MAGNESIUM OXIDE 400 MG ORAL TABLET 400 MILLIGRAM(S): 241.3 TABLET ORAL at 13:08

## 2019-09-16 RX ADMIN — NYSTATIN CREAM 1 APPLICATION(S): 100000 CREAM TOPICAL at 06:23

## 2019-09-16 NOTE — CONSULT NOTE ADULT - SUBJECTIVE AND OBJECTIVE BOX
HPI:  Patient is a 73 y/o F ([employer]) w/ a PMHx of asthma, A-fib (on Eliquis), T2DM, HFrEF, pacemaker/AICD, and perforated diverticulitis s/p L hemicolectomy and end colostomy w/ multiple washouts and abdominal wall closure w/ strattice (last surgery on 7/11) who [facility] to possible dislodgement of IR drain and increased purulent output from her abdomen.       Pt daughter at bedside, and she reports while at rehab the top IR drain may have come dislodged and believes that it is in the wrong place. She also reports increased purulent output from the drain and from the drain site. She reports that the pt also had increased purulent output from her abdominal wall during dressing changes. She reports her mother has not had any fevers or chills and that she is currently getting ertapenem for antibiotics. She reports that her mother has minimal PO intake, partially because of her dentures. She denies any n/v.   Of note, pt recently at [facility] prior to being at [facility]. A tube check was performed on 9/6 which showed that her drains were in place at this time.   In the ED, the pt is hemodynamically stable. She has a WBC of 12 (down from 14 at discharge from [facility]), and CT scan showing increase in size of the collection in the L paracolic gutter.     Per daughter, she is getting daily dressing changes - aquacel, gauze, abd and tape. (13 Sep 2019 02:40)      PAST MEDICAL & SURGICAL HISTORY:  Refusal of blood transfusions as patient is Alevism  Patient is Alevism  Vertigo  Atrial flutter  Diverticulitis  Cardiomyopathy  Kidney stone  Cardiac Pacemaker  HTN - Hypertension  Gout  Diabetes  Congestive Heart Failure  Asthma  Status post left hemicolectomy  S/P cholecystectomy  AICD (Automatic Cardioverter/Defibrillator) Present: inserted in Aug, 2008. Due for battery change in 1 month. ( [employer]) . Inserted by Dr Duffy      Review of Systems:   CONSTITUTIONAL: No fever, weight loss, or fatigue  EYES: No eye pain, visual disturbances, or discharge  ENMT:  No difficulty hearing, tinnitus, vertigo; No sinus or throat pain  NECK: No pain or stiffness  BREASTS: No pain, masses, or nipple discharge  RESPIRATORY: No cough, wheezing, chills or hemoptysis; No shortness of breath  CARDIOVASCULAR: No chest pain, palpitations, dizziness, or leg swelling  GASTROINTESTINAL: No abdominal or epigastric pain. No nausea, vomiting, or hematemesis; No diarrhea or constipation. No melena or hematochezia.  GENITOURINARY: No dysuria, frequency, hematuria, or incontinence  NEUROLOGICAL: No headaches, memory loss, loss of strength, numbness, or tremors  SKIN: No itching, burning, rashes, or lesions   LYMPH NODES: No enlarged glands  ENDOCRINE: No heat or cold intolerance; No hair loss  MUSCULOSKELETAL: No joint pain or swelling; No muscle, back, or extremity pain  PSYCHIATRIC: No depression, anxiety, mood swings, or difficulty sleeping  HEME/LYMPH: No easy bruising, or bleeding gums  ALLERY AND IMMUNOLOGIC: No hives or eczema    Allergies    Coreg (Other)  digoxin (Other; Short breath (Mild to Mod))  IV Contrast (Unknown)  penicillins (Hives)    Intolerances    metoprolol (Other)  Solu-Medrol (Other (Mild to Mod))      Social History: No EtOH, smoking, or illicit drug use. Currently retired (had worked for a phone company in the past)    FAMILY HISTORY:  Family history of brain tumor      MEDICATIONS  (STANDING):  apixaban 5 milliGRAM(s) Oral every 12 hours  atorvastatin 20 milliGRAM(s) Oral at bedtime  chlorhexidine 2% Cloths 1 Application(s) Topical daily  dextrose 5% + sodium chloride 0.9%. 1000 milliLiter(s) (30 mL/Hr) IV Continuous <Continuous>  dextrose 5%. 1000 milliLiter(s) (50 mL/Hr) IV Continuous <Continuous>  dextrose 50% Injectable 12.5 Gram(s) IV Push once  dextrose 50% Injectable 25 Gram(s) IV Push once  dextrose 50% Injectable 25 Gram(s) IV Push once  enalapril 5 milliGRAM(s) Oral daily  ertapenem  IVPB 1000 milliGRAM(s) IV Intermittent every 24 hours  famotidine    Tablet 20 milliGRAM(s) Oral daily  furosemide    Tablet 40 milliGRAM(s) Oral daily  insulin lispro (HumaLOG) corrective regimen sliding scale   SubCutaneous three times a day before meals  insulin lispro (HumaLOG) corrective regimen sliding scale   SubCutaneous at bedtime  magnesium oxide 400 milliGRAM(s) Oral three times a day with meals  montelukast 10 milliGRAM(s) Oral daily  nystatin Powder 1 Application(s) Topical two times a day  spironolactone 25 milliGRAM(s) Oral daily  tiotropium 18 MICROgram(s) Capsule 1 Capsule(s) Inhalation daily  vancomycin  IVPB 750 milliGRAM(s) IV Intermittent every 12 hours    MEDICATIONS  (PRN):  ALBUTerol    0.083% 2.5 milliGRAM(s) Nebulizer every 6 hours PRN Shortness of Breath and/or Wheezing  dextrose 40% Gel 15 Gram(s) Oral once PRN Blood Glucose LESS THAN 70 milliGRAM(s)/deciliter  glucagon  Injectable 1 milliGRAM(s) IntraMuscular once PRN Glucose LESS THAN 70 milligrams/deciliter        CAPILLARY BLOOD GLUCOSE      POCT Blood Glucose.: 105 mg/dL (16 Sep 2019 10:55)  POCT Blood Glucose.: 93 mg/dL (15 Sep 2019 21:22)  POCT Blood Glucose.: 99 mg/dL (15 Sep 2019 18:35)    I&O's Summary    15 Sep 2019 07:01  -  16 Sep 2019 07:00  --------------------------------------------------------  IN: 1800 mL / OUT: 715 mL / NET: 1085 mL    Vital Signs Last 24 Hrs  T(C): 36.6 (16 Sep 2019 10:05), Max: 36.8 (15 Sep 2019 21:32)  T(F): 97.8 (16 Sep 2019 10:05), Max: 98.3 (15 Sep 2019 21:32)  HR: 87 (16 Sep 2019 10:05) (87 - 98)  BP: 111/72 (16 Sep 2019 10:05) (105/71 - 122/71)  BP(mean): --  RR: 18 (16 Sep 2019 10:05) (18 - 18)  SpO2: 98% (16 Sep 2019 10:05) (95% - 98%)    PHYSICAL EXAM:  GENERAL: NAD, well-developed  HEAD:  Atraumatic, Normocephalic  EYES: EOMI, PERRLA, conjunctiva and sclera clear  NECK: Supple, No JVD  CHEST/LUNG: Clear to auscultation bilaterally; No wheeze  HEART: Regular rate and rhythm; No murmurs, rubs, or gallops  ABDOMEN: Soft, Nontender, Nondistended; Bowel sounds present  EXTREMITIES:  2+ Peripheral Pulses, No clubbing, cyanosis, or edema  PSYCH: AAOx3  NEUROLOGY: non-focal  SKIN: No rashes or lesions    LABS:                        7.8    6.22  )-----------( 219      ( 16 Sep 2019 08:45 )             26.5     09-15    138  |  103  |  9   ----------------------------<  72  3.9   |  25  |  0.51    Ca    8.4      15 Sep 2019 07:42  Phos  1.9     09-15  Mg     1.8     09-15                RADIOLOGY & ADDITIONAL TESTS:  Studies reviewed. HPI:  Patient is a 75 y/o F ([employer]) w/ a PMHx of asthma, A-fib (on Eliquis), T2DM, HFrEF, pacemaker/AICD, and perforated diverticulitis s/p L hemicolectomy and end colostomy w/ multiple washouts and abdominal wall closure w/ strattice (last surgery on 7/11) who [facility] possible dislodgement of IR drain and increased purulent output from her abdomen. A CT A+P was obtained which revealed a slight interval enlargement of the left paracolic gutter fluid collection measuring up to 3x3.9x10.2cm. She underwent upsizing of intraabdominal drain by IR on 9/13. Fluid cultures drawn at that time returned positive for ESBL E. coli and Enterococcus faecalis. Patient was subsequently started on IV antibiotics. Patient has a recent history of anemia in the setting of her recent abdominal surgeries. She received a 5-day course of IV iron during her hospitalization in July and she also received multiple doses of Procrit at that time. Patient's Hgb improved to ~ 10 around that time, but it has been slowly downtrending over the past month. Patient's Hgb noted to be 7.8 this AM. Hematology was consulted for assistance with management of patient's anemia.     Patient's labs were reviewed. Iron studies are c/w AoCD. Her Ferritin is 1221 this AM. Vitamin B12 and Folate were WNL. Patient has not had any noticeable bleeding from her abdominal wound.    Patient seen and examined. Patient endorses occasional pain near her abdominal wounds. Otherwise, she denies any chest pain, shortness of breath, or vomiting. She was afebrile overnight.      PAST MEDICAL & SURGICAL HISTORY:  Refusal of blood transfusions as patient is Pentecostal  Patient is Pentecostal  Vertigo  Atrial flutter  Diverticulitis  Cardiomyopathy  Kidney stone  Cardiac Pacemaker  HTN - Hypertension  Gout  Diabetes  Congestive Heart Failure  Asthma  Status post left hemicolectomy  S/P cholecystectomy  AICD (Automatic Cardioverter/Defibrillator) Present: inserted in Aug, 2008. Due for battery change in 1 month. ( [employer]) . Inserted by Dr Duffy      Review of Systems:   CONSTITUTIONAL: No fever or chills  EYES: No eye pain or discharge  ENMT: No sinus or throat pain  NECK: No pain or stiffness  RESPIRATORY: No shortness of breath or cough  CARDIOVASCULAR: No chest pain or palpitations  GASTROINTESTINAL: + Occasional abdominal pain, No vomiting  GENITOURINARY: No dysuria or hematuria  NEUROLOGICAL: No headaches or syncope  SKIN: + Abdominal wound, No rash  MUSCULOSKELETAL: No joint pain or back pain    Allergies    Coreg (Other)  digoxin (Other; Short breath (Mild to Mod))  IV Contrast (Unknown)  penicillins (Hives)    Intolerances    metoprolol (Other)  Solu-Medrol (Other (Mild to Mod))      Social History: No EtOH, smoking, or illicit drug use. Currently retired (had worked for a phone company in the past)    FAMILY HISTORY:  Family history of brain tumor      MEDICATIONS  (STANDING):  apixaban 5 milliGRAM(s) Oral every 12 hours  atorvastatin 20 milliGRAM(s) Oral at bedtime  chlorhexidine 2% Cloths 1 Application(s) Topical daily  dextrose 5% + sodium chloride 0.9%. 1000 milliLiter(s) (30 mL/Hr) IV Continuous <Continuous>  dextrose 5%. 1000 milliLiter(s) (50 mL/Hr) IV Continuous <Continuous>  dextrose 50% Injectable 12.5 Gram(s) IV Push once  dextrose 50% Injectable 25 Gram(s) IV Push once  dextrose 50% Injectable 25 Gram(s) IV Push once  enalapril 5 milliGRAM(s) Oral daily  ertapenem  IVPB 1000 milliGRAM(s) IV Intermittent every 24 hours  famotidine    Tablet 20 milliGRAM(s) Oral daily  furosemide    Tablet 40 milliGRAM(s) Oral daily  insulin lispro (HumaLOG) corrective regimen sliding scale   SubCutaneous three times a day before meals  insulin lispro (HumaLOG) corrective regimen sliding scale   SubCutaneous at bedtime  magnesium oxide 400 milliGRAM(s) Oral three times a day with meals  montelukast 10 milliGRAM(s) Oral daily  nystatin Powder 1 Application(s) Topical two times a day  spironolactone 25 milliGRAM(s) Oral daily  tiotropium 18 MICROgram(s) Capsule 1 Capsule(s) Inhalation daily  vancomycin  IVPB 750 milliGRAM(s) IV Intermittent every 12 hours    MEDICATIONS  (PRN):  ALBUTerol    0.083% 2.5 milliGRAM(s) Nebulizer every 6 hours PRN Shortness of Breath and/or Wheezing  dextrose 40% Gel 15 Gram(s) Oral once PRN Blood Glucose LESS THAN 70 milliGRAM(s)/deciliter  glucagon  Injectable 1 milliGRAM(s) IntraMuscular once PRN Glucose LESS THAN 70 milligrams/deciliter        CAPILLARY BLOOD GLUCOSE      POCT Blood Glucose.: 105 mg/dL (16 Sep 2019 10:55)  POCT Blood Glucose.: 93 mg/dL (15 Sep 2019 21:22)  POCT Blood Glucose.: 99 mg/dL (15 Sep 2019 18:35)    I&O's Summary    15 Sep 2019 07:01  -  16 Sep 2019 07:00  --------------------------------------------------------  IN: 1800 mL / OUT: 715 mL / NET: 1085 mL    Vital Signs Last 24 Hrs  T(C): 36.6 (16 Sep 2019 10:05), Max: 36.8 (15 Sep 2019 21:32)  T(F): 97.8 (16 Sep 2019 10:05), Max: 98.3 (15 Sep 2019 21:32)  HR: 87 (16 Sep 2019 10:05) (87 - 98)  BP: 111/72 (16 Sep 2019 10:05) (105/71 - 122/71)  BP(mean): --  RR: 18 (16 Sep 2019 10:05) (18 - 18)  SpO2: 98% (16 Sep 2019 10:05) (95% - 98%)    PHYSICAL EXAM:  GENERAL: NAD, Laying in bed  HEENT: NC/AT, Slightly dry mucous membranes  NECK: Supple  CHEST/LUNG: Grossly CTAB anteriorly, No wheeze appreciated  HEART: RRR; +S1/S2  ABDOMEN: +BS, Soft, + Abdominal wound with dressing in place, + 2 IR drains in place  EXTREMITIES: No LE edema  NEUROLOGY: A+Ox3, Moving all extremities  SKIN: Warm and dry, + Abdominal wound with dressing in place, + 2 IR drains in place  PSYCH: Calm and cooperative    LABS:                        7.8    6.22  )-----------( 219      ( 16 Sep 2019 08:45 )             26.5     09-15    138  |  103  |  9   ----------------------------<  72  3.9   |  25  |  0.51    Ca    8.4      15 Sep 2019 07:42  Phos  1.9     09-15  Mg     1.8     09-15                RADIOLOGY & ADDITIONAL TESTS:  Studies reviewed.

## 2019-09-16 NOTE — PROGRESS NOTE ADULT - SUBJECTIVE AND OBJECTIVE BOX
Follow Up:  Intraabdominal Abscess    Interval History: No chills or fevers overnight. Notes contined abdominal pain    REVIEW OF SYSTEMS  [  ] ROS unobtainable because:    [ x ] All other systems negative except as noted below    Constitutional:  [ ] fever [ ] chills  [ ] weight loss  [ ] weakness  Skin:  [ ] rash [ ] phlebitis	  Eyes: [ ] icterus [ ] pain  [ ] discharge	  ENMT: [ ] sore throat  [ ] thrush [ ] ulcers [ ] exudates  Respiratory: [ ] dyspnea [ ] hemoptysis [ ] cough [ ] sputum	  Cardiovascular:  [ ] chest pain [ ] palpitations [ ] edema	  Gastrointestinal:  [ ] nausea [ ] vomiting [ ] diarrhea [ ] constipation [x ] pain	  Genitourinary:  [ ] dysuria [ ] frequency [ ] hematuria [ ] discharge [ ] flank pain  [ ] incontinence  Musculoskeletal:  [ ] myalgias [ ] arthralgias [ ] arthritis  [ ] back pain  Neurological:  [ ] headache [ ] seizures  [ ] confusion/altered mental status    Allergies  Coreg (Other)  digoxin (Other; Short breath (Mild to Mod))  IV Contrast (Unknown)  penicillins (Hives)        ANTIMICROBIALS:  ertapenem  IVPB 1000 every 24 hours  vancomycin  IVPB 750 every 12 hours      OTHER MEDS:  MEDICATIONS  (STANDING):  ALBUTerol    0.083% 2.5 every 6 hours PRN  apixaban 5 every 12 hours  atorvastatin 20 at bedtime  dextrose 40% Gel 15 once PRN  dextrose 50% Injectable 12.5 once  dextrose 50% Injectable 25 once  dextrose 50% Injectable 25 once  enalapril 5 daily  epoetin dejuan Injectable 11780 once  famotidine    Tablet 20 daily  furosemide    Tablet 40 daily  glucagon  Injectable 1 once PRN  insulin lispro (HumaLOG) corrective regimen sliding scale  three times a day before meals  insulin lispro (HumaLOG) corrective regimen sliding scale  at bedtime  montelukast 10 daily  spironolactone 25 daily  tiotropium 18 MICROgram(s) Capsule 1 daily      Vital Signs Last 24 Hrs  T(C): 36.7 (16 Sep 2019 17:28), Max: 36.8 (15 Sep 2019 21:32)  T(F): 98 (16 Sep 2019 17:28), Max: 98.3 (15 Sep 2019 21:32)  HR: 94 (16 Sep 2019 17:28) (85 - 98)  BP: 113/72 (16 Sep 2019 17:28) (100/73 - 122/71)  BP(mean): --  RR: 18 (16 Sep 2019 17:28) (18 - 18)  SpO2: 98% (16 Sep 2019 17:28) (95% - 98%)    PHYSICAL EXAMINATION:  General: Alert and Awake, NAD  HEENT: PERRL, EOMI  Neck: Supple  Cardiac: RRR, No M/R/G  Resp: CTAB, No Wh/Rh/Ra  Abdomen: +RLQ Ostomy, Midline Ostomy with minimal output, LLQ drain with purulent drainage, NBS, diffuse abdominal tenderness to palpation  MSK: Trace to 1+ LE edema. No Calf tenderness  Skin: No rashes or lesions. Skin is warm and dry to the touch.   Neuro: Alert and Awake. CN 2-12 Grossly intact. Moves all four extremities spontaneously.  Psych: Calm, Pleasant, Cooperative  Vascular: RUE PICC Line (No surrounding erythema, drainage or tenderness to palpation)                          7.8    6.22  )-----------( 219      ( 16 Sep 2019 08:45 )             26.5       09-15    138  |  103  |  9   ----------------------------<  72  3.9   |  25  |  0.51    Ca    8.4      15 Sep 2019 07:42  Phos  1.9     09-15  Mg     1.8     09-15            MICROBIOLOGY:  v  .Abscess  09-13-19   Numerous Escherichia coli ESBL  Numerous Enterococcus faecalis  --  Escherichia coli ESBL  Enterococcus faecalis      .Blood  09-12-19   Growth in aerobic bottle: Staphylococcus epidermidis  --  Coag Negative Staphylococcus      BLOOD PERIPHERAL  09-03-19 --  --  --      .Blood  08-30-19   No growth at 5 days.  --  --      .Surgical Swab  08-30-19   Few Escherichia coli  Rare Enterococcus faecalis  Rare Candida albicans "Susceptibilities not performed"  --  Escherichia coli  Enterococcus faecalis      .Blood  08-30-19   No growth at 5 days.  --  --      RADIOLOGY:    EXAM:  XR CHEST PORTABLE URGENT 1V                        PROCEDURE DATE:  09/13/2019  Clear lungs.

## 2019-09-16 NOTE — CONSULT NOTE ADULT - ASSESSMENT
*** NOTE INCOMPLETE *** Patient is a 73 y/o F (Mu-ism) w/ a PMHx of asthma, A-fib (on Eliquis), T2DM, HFrEF, pacemaker/AICD, and perforated diverticulitis s/p L hemicolectomy and end colostomy w/ multiple washouts and abdominal wall closure w/ strattice (last surgery on 7/11) who presented from [facility] due to possible dislodgement of IR drain and increased purulent output from her abdomen, s/p upsizing of intraabdominal drain by IR on 9/13 with fluid cultures growing ESBL E. coli and Enterococcus faecalis, also found to have persistent anemia. Hematology consulted for assistance with management of patient's anemia.        *** NOTE INCOMPLETE *** Patient is a 75 y/o F (Quaker) w/ a PMHx of asthma, A-fib (on Eliquis), T2DM, HFrEF, pacemaker/AICD, and perforated diverticulitis s/p L hemicolectomy and end colostomy w/ multiple washouts and abdominal wall closure w/ strattice (last surgery on 7/11) who [facility] to possible dislodgement of IR drain and increased purulent output from her abdomen, s/p upsizing of intraabdominal drain by IR on 9/13 with fluid cultures growing ESBL E. coli and Enterococcus faecalis, also found to have persistent anemia. Hematology consulted for assistance with management of patient's anemia.    # Anemia  - Patient with a recent history of persistent anemia in the setting of recent abdominal surgeries and infections. She received a 5-day course of IV iron during her hospitalization in July and she also received multiple doses of Procrit at that time. Her Hgb had improved to ~ 10, but it has been slowly downtrending over the past month.   - Labs reviewed: Iron studies are c/w anemia of chronic inflammation. Ferritin = 1221 this AM. Vitamin B12/Folate are WNL. Patient without evidence of bleeding  - Patient w/ adequate iron stores currently. Recommend giving Procrit 40,000U x1 to help improve H/H  - Patient is a [employer] and does not accept blood products. Please use pediatric vials to obtain lab work and minimize blood draws as much as possible    - Monitor CBC  - Plan for patient to follow-up with Hematology after discharge for further management  - Appreciate excellent Surgery care    Plan d/w Surgery team    Doug Miguel, PGY4  Hematology-Oncology Fellow  Pager: [phone]

## 2019-09-16 NOTE — CONSULT NOTE ADULT - ATTENDING COMMENTS
73 y/o F ([employer]danism) w/ a PMHx of asthma, A-fib (on Eliquis), T2DM, HFrEF, pacemaker/AICD, and perforated diverticulitis s/p L hemicolectomy and end colostomy w/ multiple washouts and abdominal wall closure w/ strattice (last surgery on 7/11) who presented from [facility] due to possible dislodgement of IR drain and increased purulent output from her abdomen, s/p upsizing of intraabdominal drain by IR on 9/13 with fluid cultures growing ESBL E. coli and Enterococcus faecalis, also found to have persistent anemia.    Patient with adequate iron stores as had recent IV iron loading. Recommend Procrit 40,000 units x 1.
Pt seen and examined. Agree with above. Epidermal inclusion cyst seen. Supportive care and moisture barrier cream as described above.     TRAVIS Storey

## 2019-09-16 NOTE — PROGRESS NOTE ADULT - ASSESSMENT
74 year old female PMH of HFrEF, s/p AICD, asthma, HTN, HLD, atrial fibrillation on eliquis, with previous perforated diverticulosis, Hartmans procedure with multiple RTOR for abd washouts. Admitted 6/30 - 8/2 for feculent peritonitis with septic shock secondary to perforated diverticulitis with hospital course c/b HCAP 2/2 CR Pseudomonas. CT A/P 8/14 with new left lower ventral abdominal wall air-containing fluid collection with intraperitoneal communication to the level of the sigmoid colon stump and Additional enlargement of the left paracolic gutter collection. Repeat CT A/P 8/19 with extraluminal contrast consistent with dehiscence of the sigmoid colon stump. IR Drain Cultures with E. coli, AHS and Bacteroides. Presenting with drain dislodgement.     CT with CT A/P (9/12)  with Left hemicolectomy with right lower quadrant colostomy and ventral abdominal wall dehiscence. Slight interval enlargement of the left paracolic gutter fluid collection measuring up to 3 x 3.9 x 10.2 cm (TR x AP x CC)    S/p IR upsize of drain 9/13/19.  Repeat bacterial cultures from drainage with E coli and enterococcus.  Would continue Ertapenem for now (patient's leukocytosis from admission resolved without switch in antibiotics)  Blood cultures now with 2/4 bottles CoNS  Pt with PICC line.    Would continue Vancomycin pending repeat blood cultures. If repeat BCx negative at 48 hours would stop Vancomycin and switch Ertapenem to Meropenem (for 7 days to target enterococcus)    Overall, Intraabdominal Abscess, Rectal Stump Leak, CoNS Bacteremia, Leukocytosis (resolved).    --Continue Ertapenem 1g IV Q24H  --Continue Vancomycin 750mg IV Q12H. Trough prior to fourth dose.  --Followup on repeat Blood Cultures  --Continue to follow CBC with diff  --Continue to follow temperature curve    I will continue to follow. Please feel free to contact me with any further questions.    Rex Goode M.D.  [facility] Division of Infectious Disease  8AM-5PM: Pager Number [phone]  After Hours (or if no response): Please contact the Infectious Diseases Office at [phone]

## 2019-09-16 NOTE — PROGRESS NOTE ADULT - ATTENDING COMMENTS
I have seen and evaluated the patient and discussed the relevant clinical findings and plan with the surgical housestaff and fellow.  Agree with the above documentation with addenda as noted.     Intraabdominal drain upsized by IR over the weekend -- draining small amount of pus.  Superficial drain has no output.  Clinically stable.  Afebrile. Abdomen is soft.  Lower pole of abdominal wound has a deep opening ~1 cm in diameter that still has some pus pooling from it.  Rest of wound is clean and granulating.  Lateral (intraabdominal) drain with pus.  Medial drain no output.    Will consult wound care for vac placement on wound to help better control drainage.  Cont abx as per ID.  Heme consult for chronic anemia (currently asymptomatic)  Reg diet  Will plan for eventual plastics skin graft of wound -- will coordinate with Dr. Perez.      Clark Alvarado MD

## 2019-09-16 NOTE — PROGRESS NOTE ADULT - ASSESSMENT
73 yo Female with PMH of asthma, A fib, dm, HFrEF, pacemaker/AICD, perforated diverticulitis s/p L hemicolectomy and end colostomy w/ multiple washouts and abdominal wall closure w/ strattice (last surgery on 7/11), presents from [facility] due to possible dislodgement of IR drain and increased purulent output from her abdomen. CT scan showing increase in size of L paracolic gutter fluid collection, however drains appear in place. POD 3 s/p IR upsizing, though continued low drain output.       - Appreciate Heme/Onc Consult  - local wound care daily  - consider another tube check before discharge to ensure positioning i/s/o low output.  - regular diet  - continue home medications including anticoagulation  - strict I's/O's  - gentle IVF while pt NPO as pt w/o HFrEF  - heart failure consult as pt has been followed by team in past  - will continue IV abx  - per OBGYN, no interventions for labial lesion: warm compress  - per ID, continue ertapenem q24hr      [facility], 0705

## 2019-09-17 LAB
EPO SERPL-MCNC: 33.9 MIU/ML — HIGH (ref 2.6–18.5)
VANCOMYCIN TROUGH SERPL-MCNC: 16.5 UG/ML — SIGNIFICANT CHANGE UP (ref 10–20)

## 2019-09-17 PROCEDURE — 93010 ELECTROCARDIOGRAM REPORT: CPT

## 2019-09-17 PROCEDURE — 99232 SBSQ HOSP IP/OBS MODERATE 35: CPT

## 2019-09-17 PROCEDURE — 99024 POSTOP FOLLOW-UP VISIT: CPT

## 2019-09-17 RX ADMIN — ERTAPENEM SODIUM 120 MILLIGRAM(S): 1 INJECTION, POWDER, LYOPHILIZED, FOR SOLUTION INTRAMUSCULAR; INTRAVENOUS at 10:26

## 2019-09-17 RX ADMIN — MAGNESIUM OXIDE 400 MG ORAL TABLET 400 MILLIGRAM(S): 241.3 TABLET ORAL at 13:11

## 2019-09-17 RX ADMIN — FAMOTIDINE 20 MILLIGRAM(S): 10 INJECTION INTRAVENOUS at 13:11

## 2019-09-17 RX ADMIN — ALBUTEROL 2.5 MILLIGRAM(S): 90 AEROSOL, METERED ORAL at 09:09

## 2019-09-17 RX ADMIN — SODIUM CHLORIDE 30 MILLILITER(S): 9 INJECTION, SOLUTION INTRAVENOUS at 08:34

## 2019-09-17 RX ADMIN — MAGNESIUM OXIDE 400 MG ORAL TABLET 400 MILLIGRAM(S): 241.3 TABLET ORAL at 17:59

## 2019-09-17 RX ADMIN — APIXABAN 5 MILLIGRAM(S): 2.5 TABLET, FILM COATED ORAL at 05:19

## 2019-09-17 RX ADMIN — APIXABAN 5 MILLIGRAM(S): 2.5 TABLET, FILM COATED ORAL at 18:00

## 2019-09-17 RX ADMIN — MAGNESIUM OXIDE 400 MG ORAL TABLET 400 MILLIGRAM(S): 241.3 TABLET ORAL at 08:30

## 2019-09-17 RX ADMIN — Medication 250 MILLIGRAM(S): at 18:00

## 2019-09-17 RX ADMIN — Medication 250 MILLIGRAM(S): at 06:18

## 2019-09-17 RX ADMIN — NYSTATIN CREAM 1 APPLICATION(S): 100000 CREAM TOPICAL at 17:59

## 2019-09-17 RX ADMIN — ATORVASTATIN CALCIUM 20 MILLIGRAM(S): 80 TABLET, FILM COATED ORAL at 23:05

## 2019-09-17 RX ADMIN — MONTELUKAST 10 MILLIGRAM(S): 4 TABLET, CHEWABLE ORAL at 13:12

## 2019-09-17 RX ADMIN — TIOTROPIUM BROMIDE 1 CAPSULE(S): 18 CAPSULE ORAL; RESPIRATORY (INHALATION) at 13:11

## 2019-09-17 RX ADMIN — NYSTATIN CREAM 1 APPLICATION(S): 100000 CREAM TOPICAL at 05:19

## 2019-09-17 RX ADMIN — CHLORHEXIDINE GLUCONATE 1 APPLICATION(S): 213 SOLUTION TOPICAL at 13:12

## 2019-09-17 RX ADMIN — SODIUM CHLORIDE 30 MILLILITER(S): 9 INJECTION, SOLUTION INTRAVENOUS at 06:18

## 2019-09-17 RX ADMIN — ERYTHROPOIETIN 40000 UNIT(S): 10000 INJECTION, SOLUTION INTRAVENOUS; SUBCUTANEOUS at 05:19

## 2019-09-17 NOTE — PROGRESS NOTE ADULT - ASSESSMENT
74 year old female PMH of HFrEF, s/p AICD, asthma, HTN, HLD, atrial fibrillation on eliquis, with previous perforated diverticulosis, Hartmans procedure with multiple RTOR for abd washouts. Admitted 6/30 - 8/2 for feculent peritonitis with septic shock secondary to perforated diverticulitis with hospital course c/b HCAP 2/2 CR Pseudomonas. CT A/P 8/14 with new left lower ventral abdominal wall air-containing fluid collection with intraperitoneal communication to the level of the sigmoid colon stump and Additional enlargement of the left paracolic gutter collection. Repeat CT A/P 8/19 with extraluminal contrast consistent with dehiscence of the sigmoid colon stump. IR Drain Cultures with E. coli, AHS and Bacteroides. Presenting with drain dislodgement.     CT with CT A/P (9/12)  with Left hemicolectomy with right lower quadrant colostomy and ventral abdominal wall dehiscence. Slight interval enlargement of the left paracolic gutter fluid collection measuring up to 3 x 3.9 x 10.2 cm (TR x AP x CC)    S/p IR upsize of drain 9/13/19.  Repeat bacterial cultures from drainage with E coli and enterococcus.  Would continue Ertapenem for now (patient's leukocytosis from admission resolved without switch in antibiotics)  Blood cultures now with 2/4 bottles CoNS  Pt with PICC line.    Would continue Vancomycin pending repeat blood cultures. If repeat BCx negative at 48 hours would stop Vancomycin and switch Ertapenem to Meropenem (for 7 days to target enterococcus)    Overall, Intraabdominal Abscess, Rectal Stump Leak, CoNS Bacteremia, Leukocytosis (resolved).    --Continue Ertapenem 1g IV Q24H  --Continue Vancomycin 750mg IV Q12H. Trough prior to fourth dose.  --Followup on repeat Blood Cultures  --Continue to follow CBC with diff  --Continue to follow temperature curve    I will continue to follow. Please feel free to contact me with any further questions.    Rex Goode M.D.  [facility] Division of Infectious Disease  8AM-5PM: Pager Number [phone]  After Hours (or if no response): Please contact the Infectious Diseases Office at [phone]

## 2019-09-17 NOTE — PROGRESS NOTE ADULT - SUBJECTIVE AND OBJECTIVE BOX
Follow Up:  Intraabdominal Abscess    Interval History: No chills or fevers. Continued abdominal pain.     REVIEW OF SYSTEMS  [  ] ROS unobtainable because:    [x  ] All other systems negative except as noted below    Constitutional:  [ ] fever [ ] chills  [ ] weight loss  [ ] weakness  Skin:  [ ] rash [ ] phlebitis	  Eyes: [ ] icterus [ ] pain  [ ] discharge	  ENMT: [ ] sore throat  [ ] thrush [ ] ulcers [ ] exudates  Respiratory: [ ] dyspnea [ ] hemoptysis [ ] cough [ ] sputum	  Cardiovascular:  [ ] chest pain [ ] palpitations [ ] edema	  Gastrointestinal:  [ ] nausea [ ] vomiting [ ] diarrhea [ ] constipation [x ] pain	  Genitourinary:  [ ] dysuria [ ] frequency [ ] hematuria [ ] discharge [ ] flank pain  [ ] incontinence  Musculoskeletal:  [ ] myalgias [ ] arthralgias [ ] arthritis  [ ] back pain  Neurological:  [ ] headache [ ] seizures  [ ] confusion/altered mental status    Allergies  Coreg (Other)  digoxin (Other; Short breath (Mild to Mod))  IV Contrast (Unknown)  penicillins (Hives)        ANTIMICROBIALS:  ertapenem  IVPB 1000 every 24 hours  vancomycin  IVPB 750 every 12 hours      OTHER MEDS:  MEDICATIONS  (STANDING):  ALBUTerol    0.083% 2.5 every 6 hours PRN  apixaban 5 every 12 hours  atorvastatin 20 at bedtime  dextrose 40% Gel 15 once PRN  dextrose 50% Injectable 12.5 once  dextrose 50% Injectable 25 once  dextrose 50% Injectable 25 once  enalapril 5 daily  famotidine    Tablet 20 daily  furosemide    Tablet 40 daily  glucagon  Injectable 1 once PRN  insulin lispro (HumaLOG) corrective regimen sliding scale  three times a day before meals  insulin lispro (HumaLOG) corrective regimen sliding scale  at bedtime  montelukast 10 daily  spironolactone 25 daily  tiotropium 18 MICROgram(s) Capsule 1 daily      Vital Signs Last 24 Hrs  T(C): 36.9 (17 Sep 2019 16:25), Max: 37.1 (17 Sep 2019 14:11)  T(F): 98.4 (17 Sep 2019 16:25), Max: 98.8 (17 Sep 2019 14:11)  HR: 101 (17 Sep 2019 16:25) (96 - 105)  BP: 118/75 (17 Sep 2019 16:25) (101/64 - 145/78)  BP(mean): --  RR: 18 (17 Sep 2019 16:25) (17 - 18)  SpO2: 94% (17 Sep 2019 16:25) (94% - 96%)    PHYSICAL EXAMINATION:  General: Alert and Awake, NAD  HEENT: PERRL, EOMI  Neck: Supple  Cardiac: RRR, No M/R/G  Resp: CTAB, No Wh/Rh/Ra  Abdomen: +RLQ Ostomy, Midline Wound Vac with minimal output, LLQ drain with purulent drainage, NBS, diffuse abdominal tenderness to palpation  MSK: Trace to 1+ LE edema. Moderate Calf tenderness  Skin: No rashes or lesions. Skin is warm and dry to the touch.   Neuro: Alert and Awake. CN 2-12 Grossly intact. Moves all four extremities spontaneously.  Psych: Calm, Pleasant, Cooperative  Vascular: RUE PICC Line (No surrounding erythema, drainage or tenderness to palpation)                        7.8    6.22  )-----------( 219      ( 16 Sep 2019 08:45 )             26.5                   MICROBIOLOGY:  Vancomycin Level, Trough: 16.5 ug/mL (09-17-19 @ 05:26)  v  .Blood  09-16-19   No growth to date.  --  --      .Blood  09-15-19   No growth to date.  --  --      .Abscess  09-13-19   Numerous Escherichia coli ESBL  Numerous Enterococcus faecalis  --  Escherichia coli ESBL  Enterococcus faecalis      .Blood  09-12-19   Growth in aerobic bottle: Staphylococcus epidermidis  --  Coag Negative Staphylococcus      BLOOD PERIPHERAL  09-03-19 --  --  --      .Blood  08-30-19   No growth at 5 days.  --  --      .Surgical Swab  08-30-19   Few Escherichia coli  Rare Enterococcus faecalis  Rare Candida albicans "Susceptibilities not performed"  --  Escherichia coli  Enterococcus faecalis      .Blood  08-30-19   No growth at 5 days.  --  --                RADIOLOGY:    No new imaging for review at time of assessment.

## 2019-09-17 NOTE — PROGRESS NOTE ADULT - ASSESSMENT
75 yo Female with PMH of asthma, A fib, dm, HFrEF, pacemaker/AICD, perforated diverticulitis s/p L hemicolectomy and end colostomy w/ multiple washouts and abdominal wall closure w/ strattice (last surgery on 7/11), presents from [place] due to possible dislodgement of IR drain and increased purulent output from her abdomen. CT scan showing increase in size of L paracolic gutter fluid collection, however drains appear in place. POD 4 s/p IR upsizing, though continued low drain output. Wound vac placed over abdominal wound (9/16/2019).      - f/u repeat Blood Cultures  - continue home medications including anticoagulation  - strict I's/O's  - gentle IVF while pt NPO as pt w/o HFrEF  - Diet: soft  - heart failure consult as pt has been followed by team in past  - Appreciate OBGYN consult: no interventions for labial lesion: warm compress  - Appreciate ID Consult: will Continue Ertapenem 1g IV Q24H; will continue Vancomycin 750mg IV Q12H. Trough prior to fourth dose  - Appreciate Heme/Onc Consult: Iron studies are c/w anemia of chronic inflammation. Will give Procrit 40,000U x1 and use pediatric vials to obtain lab work. Will include follow-up with Hematology after discharge for further management.      [facility], 0717

## 2019-09-17 NOTE — PROGRESS NOTE ADULT - ATTENDING COMMENTS
I have seen and evaluated the patient and discussed the relevant clinical findings and plan with the surgical housestaff and fellow.  Agree with the above documentation with addenda as noted.     S/p upsizing of IR drain and wound vac placement  Doing well  Abdomen soft, vac in place.  Drain #1 (intraperitoneal) with purulent output    Cont abx as per ID  Repeat cultures  Diet as tolerated  Consulting with plastics re: possible skin graft over midline wound    Clark Alvarado MD

## 2019-09-17 NOTE — PROGRESS NOTE ADULT - SUBJECTIVE AND OBJECTIVE BOX
Green Team Surgery Progress Note     SUBJECTIVE / 24H EVENTS  Patient seen and examined on morning rounds. No acute events overnight. Wound care pouch placed on inferior fistula tract, wound vac placed at the superior aspect. Patient afebrile through the night, denies pain, has no complaints at this time.     OBJECTIVE:    VITAL SIGNS:  T(C): 36.6 (09-17-19 @ 01:33), Max: 36.8 (09-16-19 @ 06:19)  HR: 97 (09-17-19 @ 01:33) (85 - 97)  BP: 143/74 (09-17-19 @ 01:33) (100/73 - 143/74)  RR: 18 (09-17-19 @ 01:33) (18 - 18)  SpO2: 96% (09-17-19 @ 01:33) (95% - 98%)    Daily     POCT Blood Glucose.: 103 mg/dL (09-16-19 @ 21:32)  POCT Blood Glucose.: 89 mg/dL (09-16-19 @ 18:00)  POCT Blood Glucose.: 91 mg/dL (09-16-19 @ 14:13)      PHYSICAL EXAM:  General: well developed, NAD  Neuro: alert and oriented, no focal deficits, moves all extremities spontaneously  Respiratory: airway patent, respirations unlabored  CVS: regular rate and rhythm  Abdomen: soft, midline abdominal wound dressing c/d/i ostomy pink/viable, Abdominal wound healing well with granulation tissue covering  Extremities: no edema, sensation and movement grossly intact      09-15-19 @ 07:01  -  09-16-19 @ 07:00  --------------------------------------------------------  IN:    dextrose 5% + sodium chloride 0.9%.: 720 mL    IV PiggyBack: 800 mL    Oral Fluid: 280 mL  Total IN: 1800 mL    OUT:    Colostomy: 200 mL    Drain: 15 mL    Voided: 500 mL  Total OUT: 715 mL    Total NET: 1085 mL      09-16-19 @ 07:01  -  09-17-19 @ 04:16  --------------------------------------------------------  IN:    dextrose 5% + sodium chloride 0.9%.: 360 mL    Oral Fluid: 120 mL  Total IN: 480 mL    OUT:    Voided: 900 mL  Total OUT: 900 mL    Total NET: -420 mL          LAB VALUES:  09-15    138  |  103  |  9   ----------------------------<  72  3.9   |  25  |  0.51    Ca    8.4      15 Sep 2019 07:42  Phos  1.9     09-15  Mg     1.8     09-15                                 7.8    6.22  )-----------( 219      ( 16 Sep 2019 08:45 )             26.5           MICROBIOLOGY:    Culture - Blood (collected 15 Sep 2019 23:34)  Source: .Blood  Preliminary Report (17 Sep 2019 01:01):    No growth to date.      RADIOLOGY:    No new radiographic images for review.    MEDICATIONS  (STANDING):  apixaban 5 milliGRAM(s) Oral every 12 hours  atorvastatin 20 milliGRAM(s) Oral at bedtime  chlorhexidine 2% Cloths 1 Application(s) Topical daily  dextrose 5% + sodium chloride 0.9%. 1000 milliLiter(s) (30 mL/Hr) IV Continuous <Continuous>  dextrose 5%. 1000 milliLiter(s) (50 mL/Hr) IV Continuous <Continuous>  dextrose 50% Injectable 12.5 Gram(s) IV Push once  dextrose 50% Injectable 25 Gram(s) IV Push once  dextrose 50% Injectable 25 Gram(s) IV Push once  enalapril 5 milliGRAM(s) Oral daily  epoetin dejuan Injectable 68898 Unit(s) SubCutaneous once  ertapenem  IVPB 1000 milliGRAM(s) IV Intermittent every 24 hours  famotidine    Tablet 20 milliGRAM(s) Oral daily  furosemide    Tablet 40 milliGRAM(s) Oral daily  insulin lispro (HumaLOG) corrective regimen sliding scale   SubCutaneous three times a day before meals  insulin lispro (HumaLOG) corrective regimen sliding scale   SubCutaneous at bedtime  magnesium oxide 400 milliGRAM(s) Oral three times a day with meals  montelukast 10 milliGRAM(s) Oral daily  nystatin Powder 1 Application(s) Topical two times a day  spironolactone 25 milliGRAM(s) Oral daily  tiotropium 18 MICROgram(s) Capsule 1 Capsule(s) Inhalation daily  vancomycin  IVPB 750 milliGRAM(s) IV Intermittent every 12 hours    MEDICATIONS  (PRN):  ALBUTerol    0.083% 2.5 milliGRAM(s) Nebulizer every 6 hours PRN Shortness of Breath and/or Wheezing  dextrose 40% Gel 15 Gram(s) Oral once PRN Blood Glucose LESS THAN 70 milliGRAM(s)/deciliter  glucagon  Injectable 1 milliGRAM(s) IntraMuscular once PRN Glucose LESS THAN 70 milligrams/deciliter Green Team Surgery Progress Note     SUBJECTIVE / 24H EVENTS  Patient seen and examined on morning rounds. No acute events overnight. Wound care pouch placed on inferior fistula tract, wound vac placed at the superior aspect. Patient afebrile through the night, denies pain, has no complaints at this time.     OBJECTIVE:    VITAL SIGNS:  T(C): 36.6 (09-17-19 @ 01:33), Max: 36.8 (09-16-19 @ 06:19)  HR: 97 (09-17-19 @ 01:33) (85 - 97)  BP: 143/74 (09-17-19 @ 01:33) (100/73 - 143/74)  RR: 18 (09-17-19 @ 01:33) (18 - 18)  SpO2: 96% (09-17-19 @ 01:33) (95% - 98%)    Daily     POCT Blood Glucose.: 103 mg/dL (09-16-19 @ 21:32)  POCT Blood Glucose.: 89 mg/dL (09-16-19 @ 18:00)  POCT Blood Glucose.: 91 mg/dL (09-16-19 @ 14:13)      PHYSICAL EXAM:  General: well developed, NAD  Neuro: alert and oriented, no focal deficits, moves all extremities spontaneously  Respiratory: airway patent, respirations unlabored  CVS: regular rate and rhythm  Abdomen: soft, midline abdominal wound dressed with vac c/d/i, infraumbilical pouch draining pus, ostomy pink/viable, Abdominal wound healing well with granulation tissue covering  Extremities: no edema, sensation and movement grossly intact      09-15-19 @ 07:01  -  09-16-19 @ 07:00  --------------------------------------------------------  IN:    dextrose 5% + sodium chloride 0.9%.: 720 mL    IV PiggyBack: 800 mL    Oral Fluid: 280 mL  Total IN: 1800 mL    OUT:    Colostomy: 200 mL    Drain: 15 mL    Voided: 500 mL  Total OUT: 715 mL    Total NET: 1085 mL      09-16-19 @ 07:01  -  09-17-19 @ 04:16  --------------------------------------------------------  IN:    dextrose 5% + sodium chloride 0.9%.: 360 mL    Oral Fluid: 120 mL  Total IN: 480 mL    OUT:    Voided: 900 mL  Total OUT: 900 mL    Total NET: -420 mL          LAB VALUES:  09-15    138  |  103  |  9   ----------------------------<  72  3.9   |  25  |  0.51    Ca    8.4      15 Sep 2019 07:42  Phos  1.9     09-15  Mg     1.8     09-15                                 7.8    6.22  )-----------( 219      ( 16 Sep 2019 08:45 )             26.5           MICROBIOLOGY:    Culture - Blood (collected 15 Sep 2019 23:34)  Source: .Blood  Preliminary Report (17 Sep 2019 01:01):    No growth to date.      RADIOLOGY:    No new radiographic images for review.    MEDICATIONS  (STANDING):  apixaban 5 milliGRAM(s) Oral every 12 hours  atorvastatin 20 milliGRAM(s) Oral at bedtime  chlorhexidine 2% Cloths 1 Application(s) Topical daily  dextrose 5% + sodium chloride 0.9%. 1000 milliLiter(s) (30 mL/Hr) IV Continuous <Continuous>  dextrose 5%. 1000 milliLiter(s) (50 mL/Hr) IV Continuous <Continuous>  dextrose 50% Injectable 12.5 Gram(s) IV Push once  dextrose 50% Injectable 25 Gram(s) IV Push once  dextrose 50% Injectable 25 Gram(s) IV Push once  enalapril 5 milliGRAM(s) Oral daily  epoetin dejuan Injectable 52472 Unit(s) SubCutaneous once  ertapenem  IVPB 1000 milliGRAM(s) IV Intermittent every 24 hours  famotidine    Tablet 20 milliGRAM(s) Oral daily  furosemide    Tablet 40 milliGRAM(s) Oral daily  insulin lispro (HumaLOG) corrective regimen sliding scale   SubCutaneous three times a day before meals  insulin lispro (HumaLOG) corrective regimen sliding scale   SubCutaneous at bedtime  magnesium oxide 400 milliGRAM(s) Oral three times a day with meals  montelukast 10 milliGRAM(s) Oral daily  nystatin Powder 1 Application(s) Topical two times a day  spironolactone 25 milliGRAM(s) Oral daily  tiotropium 18 MICROgram(s) Capsule 1 Capsule(s) Inhalation daily  vancomycin  IVPB 750 milliGRAM(s) IV Intermittent every 12 hours    MEDICATIONS  (PRN):  ALBUTerol    0.083% 2.5 milliGRAM(s) Nebulizer every 6 hours PRN Shortness of Breath and/or Wheezing  dextrose 40% Gel 15 Gram(s) Oral once PRN Blood Glucose LESS THAN 70 milliGRAM(s)/deciliter  glucagon  Injectable 1 milliGRAM(s) IntraMuscular once PRN Glucose LESS THAN 70 milligrams/deciliter

## 2019-09-18 PROCEDURE — 99232 SBSQ HOSP IP/OBS MODERATE 35: CPT

## 2019-09-18 RX ORDER — MEROPENEM 1 G/30ML
1000 INJECTION INTRAVENOUS EVERY 8 HOURS
Refills: 0 | Status: DISCONTINUED | OUTPATIENT
Start: 2019-09-18 | End: 2019-09-30

## 2019-09-18 RX ADMIN — MEROPENEM 100 MILLIGRAM(S): 1 INJECTION INTRAVENOUS at 13:20

## 2019-09-18 RX ADMIN — Medication 250 MILLIGRAM(S): at 06:28

## 2019-09-18 RX ADMIN — MONTELUKAST 10 MILLIGRAM(S): 4 TABLET, CHEWABLE ORAL at 13:21

## 2019-09-18 RX ADMIN — NYSTATIN CREAM 1 APPLICATION(S): 100000 CREAM TOPICAL at 18:01

## 2019-09-18 RX ADMIN — TIOTROPIUM BROMIDE 1 CAPSULE(S): 18 CAPSULE ORAL; RESPIRATORY (INHALATION) at 13:21

## 2019-09-18 RX ADMIN — MEROPENEM 100 MILLIGRAM(S): 1 INJECTION INTRAVENOUS at 22:25

## 2019-09-18 RX ADMIN — NYSTATIN CREAM 1 APPLICATION(S): 100000 CREAM TOPICAL at 05:38

## 2019-09-18 RX ADMIN — CHLORHEXIDINE GLUCONATE 1 APPLICATION(S): 213 SOLUTION TOPICAL at 13:20

## 2019-09-18 RX ADMIN — Medication 40 MILLIGRAM(S): at 05:38

## 2019-09-18 RX ADMIN — Medication 5 MILLIGRAM(S): at 05:38

## 2019-09-18 RX ADMIN — FAMOTIDINE 20 MILLIGRAM(S): 10 INJECTION INTRAVENOUS at 13:21

## 2019-09-18 RX ADMIN — APIXABAN 5 MILLIGRAM(S): 2.5 TABLET, FILM COATED ORAL at 18:01

## 2019-09-18 RX ADMIN — MAGNESIUM OXIDE 400 MG ORAL TABLET 400 MILLIGRAM(S): 241.3 TABLET ORAL at 09:52

## 2019-09-18 RX ADMIN — SPIRONOLACTONE 25 MILLIGRAM(S): 25 TABLET, FILM COATED ORAL at 05:38

## 2019-09-18 RX ADMIN — APIXABAN 5 MILLIGRAM(S): 2.5 TABLET, FILM COATED ORAL at 05:38

## 2019-09-18 RX ADMIN — ATORVASTATIN CALCIUM 20 MILLIGRAM(S): 80 TABLET, FILM COATED ORAL at 22:26

## 2019-09-18 RX ADMIN — ERTAPENEM SODIUM 120 MILLIGRAM(S): 1 INJECTION, POWDER, LYOPHILIZED, FOR SOLUTION INTRAMUSCULAR; INTRAVENOUS at 09:51

## 2019-09-18 RX ADMIN — MAGNESIUM OXIDE 400 MG ORAL TABLET 400 MILLIGRAM(S): 241.3 TABLET ORAL at 13:20

## 2019-09-18 RX ADMIN — MAGNESIUM OXIDE 400 MG ORAL TABLET 400 MILLIGRAM(S): 241.3 TABLET ORAL at 18:00

## 2019-09-18 RX ADMIN — ALBUTEROL 2.5 MILLIGRAM(S): 90 AEROSOL, METERED ORAL at 18:01

## 2019-09-18 NOTE — PROGRESS NOTE ADULT - SUBJECTIVE AND OBJECTIVE BOX
Follow Up:  Intraabdominal abscess.    Interval History: No chills or fevers. Notes some continued improvement in abdominal pain.     REVIEW OF SYSTEMS  [  ] ROS unobtainable because:    [ x ] All other systems negative except as noted below    Constitutional:  [ ] fever [ ] chills  [ ] weight loss  [ ] weakness  Skin:  [ ] rash [ ] phlebitis	  Eyes: [ ] icterus [ ] pain  [ ] discharge	  ENMT: [ ] sore throat  [ ] thrush [ ] ulcers [ ] exudates  Respiratory: [ ] dyspnea [ ] hemoptysis [ ] cough [ ] sputum	  Cardiovascular:  [ ] chest pain [ ] palpitations [ ] edema	  Gastrointestinal:  [ ] nausea [ ] vomiting [ ] diarrhea [ ] constipation [ x] pain	  Genitourinary:  [ ] dysuria [ ] frequency [ ] hematuria [ ] discharge [ ] flank pain  [ ] incontinence  Musculoskeletal:  [ ] myalgias [ ] arthralgias [ ] arthritis  [ ] back pain  Neurological:  [ ] headache [ ] seizures  [ ] confusion/altered mental status    Allergies  Coreg (Other)  digoxin (Other; Short breath (Mild to Mod))  IV Contrast (Unknown)  penicillins (Hives)        ANTIMICROBIALS:  meropenem  IVPB 1000 every 8 hours      OTHER MEDS:  MEDICATIONS  (STANDING):  ALBUTerol    0.083% 2.5 every 6 hours PRN  apixaban 5 every 12 hours  atorvastatin 20 at bedtime  dextrose 40% Gel 15 once PRN  dextrose 50% Injectable 12.5 once  dextrose 50% Injectable 25 once  dextrose 50% Injectable 25 once  enalapril 5 daily  famotidine    Tablet 20 daily  furosemide    Tablet 40 daily  glucagon  Injectable 1 once PRN  insulin lispro (HumaLOG) corrective regimen sliding scale  three times a day before meals  insulin lispro (HumaLOG) corrective regimen sliding scale  at bedtime  montelukast 10 daily  spironolactone 25 daily  tiotropium 18 MICROgram(s) Capsule 1 daily      Vital Signs Last 24 Hrs  T(C): 37.1 (18 Sep 2019 13:53), Max: 37.2 (18 Sep 2019 05:01)  T(F): 98.8 (18 Sep 2019 13:53), Max: 99 (18 Sep 2019 05:01)  HR: 105 (18 Sep 2019 13:53) (99 - 910)  BP: 107/70 (18 Sep 2019 13:53) (105/64 - 124/78)  BP(mean): --  RR: 18 (18 Sep 2019 13:53) (18 - 18)  SpO2: 96% (18 Sep 2019 13:53) (94% - 96%)    PHYSICAL EXAMINATION:  General: Alert and Awake, NAD  HEENT: PERRL, EOMI  Neck: Supple  Cardiac: RRR, No M/R/G  Resp: CTAB, No Wh/Rh/Ra  Abdomen: NBS, NT/ND, No HSM, No rigidity or guarding  MSK: No LE edema. No Calf tenderness  : No starr  Skin: No rashes or lesions. Skin is warm and dry to the touch.   Neuro: Alert and Awake. CN 2-12 Grossly intact. Moves all four extremities spontaneously.  Psych: Calm, Pleasant, Cooperative      MICROBIOLOGY:  v  .Blood  09-16-19   No growth to date.  --  --      .Blood  09-15-19   No growth to date.  --  --      .Abscess  09-13-19   Numerous Escherichia coli ESBL  Numerous Enterococcus faecalis  --  Escherichia coli ESBL  Enterococcus faecalis      .Blood  09-12-19   Growth in aerobic bottle: Staphylococcus epidermidis  --  Coag Negative Staphylococcus      BLOOD PERIPHERAL  09-03-19 --  --  --      .Blood  08-30-19   No growth at 5 days.  --  --      .Surgical Swab  08-30-19   Few Escherichia coli  Rare Enterococcus faecalis  Rare Candida albicans "Susceptibilities not performed"  --  Escherichia coli  Enterococcus faecalis      .Blood  08-30-19   No growth at 5 days.  --  --      RADIOLOGY:    No new imaging for review at time of assessment. Follow Up:  Intraabdominal abscess.    Interval History: No chills or fevers. Notes some continued improvement in abdominal pain.     REVIEW OF SYSTEMS  [  ] ROS unobtainable because:    [ x ] All other systems negative except as noted below    Constitutional:  [ ] fever [ ] chills  [ ] weight loss  [ ] weakness  Skin:  [ ] rash [ ] phlebitis	  Eyes: [ ] icterus [ ] pain  [ ] discharge	  ENMT: [ ] sore throat  [ ] thrush [ ] ulcers [ ] exudates  Respiratory: [ ] dyspnea [ ] hemoptysis [ ] cough [ ] sputum	  Cardiovascular:  [ ] chest pain [ ] palpitations [ ] edema	  Gastrointestinal:  [ ] nausea [ ] vomiting [ ] diarrhea [ ] constipation [ x] pain	  Genitourinary:  [ ] dysuria [ ] frequency [ ] hematuria [ ] discharge [ ] flank pain  [ ] incontinence  Musculoskeletal:  [ ] myalgias [ ] arthralgias [ ] arthritis  [ ] back pain  Neurological:  [ ] headache [ ] seizures  [ ] confusion/altered mental status    Allergies  Coreg (Other)  digoxin (Other; Short breath (Mild to Mod))  IV Contrast (Unknown)  penicillins (Hives)        ANTIMICROBIALS:  meropenem  IVPB 1000 every 8 hours      OTHER MEDS:  MEDICATIONS  (STANDING):  ALBUTerol    0.083% 2.5 every 6 hours PRN  apixaban 5 every 12 hours  atorvastatin 20 at bedtime  dextrose 40% Gel 15 once PRN  dextrose 50% Injectable 12.5 once  dextrose 50% Injectable 25 once  dextrose 50% Injectable 25 once  enalapril 5 daily  famotidine    Tablet 20 daily  furosemide    Tablet 40 daily  glucagon  Injectable 1 once PRN  insulin lispro (HumaLOG) corrective regimen sliding scale  three times a day before meals  insulin lispro (HumaLOG) corrective regimen sliding scale  at bedtime  montelukast 10 daily  spironolactone 25 daily  tiotropium 18 MICROgram(s) Capsule 1 daily      Vital Signs Last 24 Hrs  T(C): 37.1 (18 Sep 2019 13:53), Max: 37.2 (18 Sep 2019 05:01)  T(F): 98.8 (18 Sep 2019 13:53), Max: 99 (18 Sep 2019 05:01)  HR: 105 (18 Sep 2019 13:53) (99 - 910)  BP: 107/70 (18 Sep 2019 13:53) (105/64 - 124/78)  BP(mean): --  RR: 18 (18 Sep 2019 13:53) (18 - 18)  SpO2: 96% (18 Sep 2019 13:53) (94% - 96%)    PHYSICAL EXAMINATION:  General: Alert and Awake, NAD  HEENT: PERRL, EOMI  Neck: Supple  Cardiac: RRR, No M/R/G  Resp: CTAB, No Wh/Rh/Ra  Abdomen: +RLQ Ostomy, Midline Wound Vac with minimal output, LLQ drain with purulent drainage, NBS, diffuse abdominal tenderness to palpation  MSK: Trace to 1+ LE edema. Moderate Calf tenderness  Skin: No rashes or lesions. Skin is warm and dry to the touch.   Neuro: Alert and Awake. CN 2-12 Grossly intact. Moves all four extremities spontaneously.  Psych: Calm, Pleasant, Cooperative  Vascular: RUE PICC Line (No surrounding erythema, drainage or tenderness to palpation)      MICROBIOLOGY:  v  .Blood  09-16-19   No growth to date.  --  --      .Blood  09-15-19   No growth to date.  --  --      .Abscess  09-13-19   Numerous Escherichia coli ESBL  Numerous Enterococcus faecalis  --  Escherichia coli ESBL  Enterococcus faecalis      .Blood  09-12-19   Growth in aerobic bottle: Staphylococcus epidermidis  --  Coag Negative Staphylococcus      BLOOD PERIPHERAL  09-03-19 --  --  --      .Blood  08-30-19   No growth at 5 days.  --  --      .Surgical Swab  08-30-19   Few Escherichia coli  Rare Enterococcus faecalis  Rare Candida albicans "Susceptibilities not performed"  --  Escherichia coli  Enterococcus faecalis      .Blood  08-30-19   No growth at 5 days.  --  --      RADIOLOGY:    No new imaging for review at time of assessment.

## 2019-09-18 NOTE — PROGRESS NOTE ADULT - ASSESSMENT
73 yo Female with PMH of asthma, A fib, dm, HFrEF, pacemaker/AICD, perforated diverticulitis s/p L hemicolectomy and end colostomy w/ multiple washouts and abdominal wall closure w/ strattice (last surgery on 7/11), presents from [place] due to possible dislodgement of IR drain and increased purulent output from her abdomen. CT scan showing increase in size of L paracolic gutter fluid collection, however drains appear in place. POD 5 s/p IR upsizing, though continued low drain output. Wound vac placed over abdominal wound (9/16/2019).      - f/u repeat Blood Cultures, negative at 2d  - d/c drain with vac change today  - continue home medications including anticoagulation  - strict I's/O's  - gentle IVF while pt NPO as pt w/o HFrEF  - Diet: soft  - heart failure consult as pt has been followed by team in past  - Appreciate OBGYN consult: no interventions for labial lesion: warm compress  - Appreciate ID Consult: will Continue Ertapenem 1g IV Q24H; will continue Vancomycin 750mg IV Q12H. Trough prior to fourth dose  - Appreciate Heme/Onc Consult: Iron studies are c/w anemia of chronic inflammation. Given Procrit 40,000U x1 and use pediatric vials to obtain lab work. Will include follow-up with Hematology after discharge for further management.      [facility], [id number]

## 2019-09-18 NOTE — PROGRESS NOTE ADULT - SUBJECTIVE AND OBJECTIVE BOX
No acute events overnight    SUBJECTIVE:  Patient continues to have no complaints She has no abdominal pain and is no longer leaking puis from the abdomen due to new dressings. She si tolerating a diet well.    OBJECTIVE:  Vital Signs Last 24 Hrs  T(C): 37 (18 Sep 2019 01:27), Max: 37.1 (17 Sep 2019 14:11)  T(F): 98.6 (18 Sep 2019 01:27), Max: 98.8 (17 Sep 2019 14:11)  HR: 99 (18 Sep 2019 01:27) (99 - 105)  BP: 113/72 (18 Sep 2019 01:27) (102/67 - 145/78)  BP(mean): --  RR: 18 (18 Sep 2019 01:27) (17 - 18)  SpO2: 95% (18 Sep 2019 01:27) (94% - 96%)    I&O's Detail    16 Sep 2019 07:01  -  17 Sep 2019 07:00  --------------------------------------------------------  IN:    dextrose 5% + sodium chloride 0.9%: 720 mL    IV PiggyBack: 250 mL    Oral Fluid: 120 mL  Total IN: 1090 mL    OUT:    Colostomy: 75 mL    Voided: 1500 mL  Total OUT: 1575 mL    Total NET: -485 mL      17 Sep 2019 07:01  -  18 Sep 2019 03:52  --------------------------------------------------------  IN:    dextrose 5% + sodium chloride 0.9%: 270 mL    Oral Fluid: 505 mL  Total IN: 775 mL    OUT:    Colostomy: 150 mL    Other: 50 mL    Voided: 340 mL  Total OUT: 540 mL    Total NET: 235 mL          Inpatient Medications:  MEDICATIONS  (STANDING):  apixaban 5 milliGRAM(s) Oral every 12 hours  atorvastatin 20 milliGRAM(s) Oral at bedtime  chlorhexidine 2% Cloths 1 Application(s) Topical daily  dextrose 5%. 1000 milliLiter(s) (50 mL/Hr) IV Continuous <Continuous>  dextrose 50% Injectable 12.5 Gram(s) IV Push once  dextrose 50% Injectable 25 Gram(s) IV Push once  dextrose 50% Injectable 25 Gram(s) IV Push once  enalapril 5 milliGRAM(s) Oral daily  ertapenem  IVPB 1000 milliGRAM(s) IV Intermittent every 24 hours  famotidine    Tablet 20 milliGRAM(s) Oral daily  furosemide    Tablet 40 milliGRAM(s) Oral daily  insulin lispro (HumaLOG) corrective regimen sliding scale   SubCutaneous three times a day before meals  insulin lispro (HumaLOG) corrective regimen sliding scale   SubCutaneous at bedtime  magnesium oxide 400 milliGRAM(s) Oral three times a day with meals  montelukast 10 milliGRAM(s) Oral daily  nystatin Powder 1 Application(s) Topical two times a day  spironolactone 25 milliGRAM(s) Oral daily  tiotropium 18 MICROgram(s) Capsule 1 Capsule(s) Inhalation daily  vancomycin  IVPB 750 milliGRAM(s) IV Intermittent every 12 hours    MEDICATIONS  (PRN):  ALBUTerol    0.083% 2.5 milliGRAM(s) Nebulizer every 6 hours PRN Shortness of Breath and/or Wheezing  dextrose 40% Gel 15 Gram(s) Oral once PRN Blood Glucose LESS THAN 70 milliGRAM(s)/deciliter  glucagon  Injectable 1 milliGRAM(s) IntraMuscular once PRN Glucose LESS THAN 70 milligrams/deciliter      PHYSICAL EXAM:  General: well developed, NAD  Neuro: alert and oriented, no focal deficits, moves all extremities spontaneously  Respiratory: airway patent, respirations unlabored  CVS: regular rate and rhythm  Abdomen: soft, midline abdominal wound dressed with vac c/d/i, infraumbilical pouch draining pus, ostomy pink/viable, Abdominal wound healing well with granulation tissue covering  Extremities: no edema, sensation and movement grossly intact      LABS:                        7.8    6.22  )-----------( 219      ( 16 Sep 2019 08:45 )             26.5               CULTURES:  .Blood  09-16 @ 12:08   No growth to date.  --  --      .Blood  09-15 @ 23:34   No growth to date.  --  --      .Abscess  09-13 @ 22:59   Numerous Escherichia coli ESBL  Numerous Enterococcus faecalis  --  Escherichia coli ESBL  Enterococcus faecalis      .Blood  09-12 @ 22:49   Growth in aerobic bottle: Staphylococcus epidermidis  --  Coag Negative Staphylococcus      BLOOD PERIPHERAL  09-03 @ 07:17 --    NO ORGANISMS ISOLATED  --      .Blood  08-30 @ 18:43   No growth at 5 days.  --  --      .Surgical Swab  08-30 @ 18:42   Few Escherichia coli  Rare Enterococcus faecalis  Rare Candida albicans "Susceptibilities not performed"  --  Escherichia coli  Enterococcus faecalis      .Blood  08-30 @ 18:00   No growth at 5 days.  --  --      .Body Fluid right abdomen wall  08-16 @ 15:21   No growth at 5 days  --    Few polymorphonuclear leukocytes per low power field  No organisms seen per oil power field      .Body Fluid left retroperitoneal  08-16 @ 15:18   Numerous Escherichia coli  Numerous Alpha hemolytic strep "Susceptibilities not performed"  Moderate Bacteroides thetaiotaomicron "Susceptibilities not performed"  Few Enterococcus faecalis  --  Escherichia coli  Enterococcus faecalis      .Blood  08-16 @ 13:08   No growth at 5 days.  --  --      .Blood  08-14 @ 17:44   Growth in aerobic bottle: Coag Negative Staphylococcus  Single set isolate, possible contaminant. Contact  Microbiology if susceptibility testing clinically  indicated.  "Due to technical problems, Proteus sp. will Not be reported as part of  the BCID panel until further notice"  ***Blood Panel PCR results on this specimen are available  approximately 3 hours after the Gram stain result.***  Gram stain, PCR, and/or culture results may not always  correspond due to difference in methodologies.  ************************************************************  This PCR assay was performed using [employer].  The following targets are tested for: Enterococcus,  vancomycin resistant enterococci, Listeria monocytogenes,  coagulase negative staphylococci, S. aureus,  methicillin resistant S. aureus, Streptococcus agalactiae  (Group B), S. pneumoniae, S. pyogenes (Group A),  Acinetobacter baumannii, Enterobacter cloacae, E. coli,  Klebsiella oxytoca, K. pneumoniae, Proteus sp.,  Serratia marcescens, Haemophilus influenzae,  Neisseria meningitidis, Pseudomonas aeruginosa, Candida  albicans, C. glabrata, C krusei, C parapsilosis,  C. tropicalis and the KPC resistance gene.  --  Blood Culture PCR      .Sputum  07-29 @ 04:17   Moderate Pseudomonas aeruginosa (Carbapenem Resistant) #2  Normal Respiratory Liz present  --  Pseudomonas aeruginosa (Carbapenem Resistant)      .Urine  07-24 @ 01:06   No growth  --  --      .Body Fluid  07-23 @ 13:26   Numerous Escherichia coli  Few Coag Negative Staphylococcus  Numerous Escherichia coli #2  --  Escherichia coli  Escherichia coli      .Blood  07-10 @ 20:09   No growth at 5 days.  --  --      .Blood  07-10 @ 20:08   No growth at 5 days.  --  --      .Body Fluid abdominal fluid  07-09 @ 17:54   Few Enterococcus faecalis  Few Coag Negative Staphylococcus  --  Enterococcus faecalis  Coag Negative Staphylococcus      .Body Fluid abdominal fluid  07-09 @ 17:46   Few Enterococcus faecalis  Few Coag Negative Staphylococcus  --  Enterococcus faecalis  Coag Negative Staphylococcus      .Surgical Swab None Specimen Source:  abdomen, esw  07-02 @ 10:15   Moderate Escherichia coli  Moderate Alpha hemolytic strep "Susceptibilities not performed"  Moderate Strep mitis oralis group "Susceptibilities not performed"  Growth in fluid media only Enterococcus faecalis  --  Escherichia coli  Enterococcus faecalis      .Blood  07-01 @ 10:04   No growth at 5 days.  --  --      .Urine  07-01 @ 10:03   No growth  --  --          IMAGING:  []

## 2019-09-18 NOTE — PROGRESS NOTE ADULT - ASSESSMENT
74 year old female PMH of HFrEF, s/p AICD, asthma, HTN, HLD, atrial fibrillation on eliquis, with previous perforated diverticulosis, Hartmans procedure with multiple RTOR for abd washouts. Admitted 6/30 - 8/2 for feculent peritonitis with septic shock secondary to perforated diverticulitis with hospital course c/b HCAP 2/2 CR Pseudomonas. CT A/P 8/14 with new left lower ventral abdominal wall air-containing fluid collection with intraperitoneal communication to the level of the sigmoid colon stump and Additional enlargement of the left paracolic gutter collection. Repeat CT A/P 8/19 with extraluminal contrast consistent with dehiscence of the sigmoid colon stump. IR Drain Cultures with E. coli, AHS and Bacteroides. Presenting with drain dislodgement.     CT with CT A/P (9/12)  with Left hemicolectomy with right lower quadrant colostomy and ventral abdominal wall dehiscence. Slight interval enlargement of the left paracolic gutter fluid collection measuring up to 3 x 3.9 x 10.2 cm (TR x AP x CC)    S/p IR upsize of drain 9/13/19.  Repeat bacterial cultures from drainage with E coli and enterococcus.   Blood cultures now with 2/4 bottles CoNS  Pt with PICC line but repeat BCx with NGTD  I suspect likely procurement contaminant    I would stop Vanco and switch Ertapenem to Meropenem to confer some Enterococcal coverage. Anticipate at discharge to switch back to Ertapenem.     Overall, Intraabdominal Abscess, Rectal Stump Leak, CoNS Bacteremia, Leukocytosis (resolved).    --Stop Vancomycin  --Start Meropenem 1g IV Q8H  --Followup on repeat Blood Cultures  --Continue to follow CBC with diff  --Continue to follow temperature curve    I will be away from 9/19 - 9/22. Please contact the Infectious Diseases Office with any further questions or concerns.     Rex Goode M.D.  [facility] Division of Infectious Disease  8AM-5PM: Pager Number [phone]  After Hours (or if no response): Please contact the Infectious Diseases Office at [phone]

## 2019-09-19 PROCEDURE — 99232 SBSQ HOSP IP/OBS MODERATE 35: CPT

## 2019-09-19 PROCEDURE — 99232 SBSQ HOSP IP/OBS MODERATE 35: CPT | Mod: 24

## 2019-09-19 RX ORDER — NYSTATIN CREAM 100000 [USP'U]/G
1 CREAM TOPICAL ONCE
Refills: 0 | Status: COMPLETED | OUTPATIENT
Start: 2019-09-19 | End: 2019-09-22

## 2019-09-19 RX ADMIN — FAMOTIDINE 20 MILLIGRAM(S): 10 INJECTION INTRAVENOUS at 12:30

## 2019-09-19 RX ADMIN — MEROPENEM 100 MILLIGRAM(S): 1 INJECTION INTRAVENOUS at 21:36

## 2019-09-19 RX ADMIN — MAGNESIUM OXIDE 400 MG ORAL TABLET 400 MILLIGRAM(S): 241.3 TABLET ORAL at 18:02

## 2019-09-19 RX ADMIN — CHLORHEXIDINE GLUCONATE 1 APPLICATION(S): 213 SOLUTION TOPICAL at 12:31

## 2019-09-19 RX ADMIN — MEROPENEM 100 MILLIGRAM(S): 1 INJECTION INTRAVENOUS at 06:18

## 2019-09-19 RX ADMIN — TIOTROPIUM BROMIDE 1 CAPSULE(S): 18 CAPSULE ORAL; RESPIRATORY (INHALATION) at 12:33

## 2019-09-19 RX ADMIN — MAGNESIUM OXIDE 400 MG ORAL TABLET 400 MILLIGRAM(S): 241.3 TABLET ORAL at 12:31

## 2019-09-19 RX ADMIN — NYSTATIN CREAM 1 APPLICATION(S): 100000 CREAM TOPICAL at 06:18

## 2019-09-19 RX ADMIN — ATORVASTATIN CALCIUM 20 MILLIGRAM(S): 80 TABLET, FILM COATED ORAL at 21:36

## 2019-09-19 RX ADMIN — APIXABAN 5 MILLIGRAM(S): 2.5 TABLET, FILM COATED ORAL at 06:18

## 2019-09-19 RX ADMIN — APIXABAN 5 MILLIGRAM(S): 2.5 TABLET, FILM COATED ORAL at 18:00

## 2019-09-19 RX ADMIN — MONTELUKAST 10 MILLIGRAM(S): 4 TABLET, CHEWABLE ORAL at 12:30

## 2019-09-19 RX ADMIN — NYSTATIN CREAM 1 APPLICATION(S): 100000 CREAM TOPICAL at 18:02

## 2019-09-19 RX ADMIN — MAGNESIUM OXIDE 400 MG ORAL TABLET 400 MILLIGRAM(S): 241.3 TABLET ORAL at 08:37

## 2019-09-19 RX ADMIN — Medication 0: at 12:32

## 2019-09-19 RX ADMIN — MEROPENEM 100 MILLIGRAM(S): 1 INJECTION INTRAVENOUS at 14:11

## 2019-09-19 NOTE — PROGRESS NOTE ADULT - ASSESSMENT
73 yo Female with PMH of asthma, A fib, dm, HFrEF, pacemaker/AICD, perforated diverticulitis s/p L hemicolectomy and end colostomy w/ multiple washouts and abdominal wall closure w/ strattice (last surgery on 7/11), presents from [place] due to possible dislodgement of IR drain and increased purulent output from her abdomen. CT scan showing increase in size of L paracolic gutter fluid collection, however drains appear in place. POD 6 s/p IR upsizing, though continued low drain output. Wound vac placed over abdominal wound (9/16/2019).      - f/u repeat Blood Cultures, negative at 3d  - continue home medications including anticoagulation  - strict I's/O's  - gentle IVF while pt NPO as pt w/o HFrEF  - Diet: soft  - heart failure consult as pt has been followed by team in past  - Appreciate OBGYN consult: no interventions for labial lesion: warm compress  - Appreciate ID Consult: will Continue Ertapenem 1g IV Q24H; will continue Vancomycin 750mg IV Q12H. Trough prior to fourth dose  - Appreciate Heme/Onc Consult: Iron studies are c/w anemia of chronic inflammation. Given Procrit 40,000U x1 and use pediatric vials to obtain lab work. Will include follow-up with Hematology after discharge for further management.  - Consider placement to rehab today/tomorrow      [facility], [id number] 73 yo Female with PMH of asthma, A fib, dm, HFrEF, pacemaker/AICD, perforated diverticulitis s/p L hemicolectomy and end colostomy w/ multiple washouts and abdominal wall closure w/ strattice (last surgery on 7/11), presents from [place] due to possible dislodgement of IR drain and increased purulent output from her abdomen. CT scan showing increase in size of L paracolic gutter fluid collection, however drains appear in place. POD 6 s/p IR upsizing, though continued low drain output. Wound vac placed over abdominal wound (9/16/2019).      - f/u repeat Blood Cultures, negative at 3d  - continue home medications including anticoagulation  - strict I's/O's  - gentle IVF while pt NPO as pt w/o HFrEF  - Diet: soft  - heart failure consult as pt has been followed by team in past  - Appreciate OBGYN consult: no interventions for labial lesion: warm compress  - Appreciate ID Consult: will Continue Ertapenem 1g IV Q24H; will continue Vancomycin 750mg IV Q12H. Trough prior to fourth dose  - Appreciate Heme/Onc Consult: Iron studies are c/w anemia of chronic inflammation. Given Procrit 40,000U x1 and use pediatric vials to obtain lab work. Will include follow-up with Hematology after discharge for further management.    [facility], [id number]

## 2019-09-19 NOTE — PROGRESS NOTE ADULT - ASSESSMENT
74 year old female PMH of HFrEF, s/p AICD, asthma, HTN, HLD, atrial fibrillation on eliquis, with previous perforated diverticulosis, Hartmans procedure with multiple RTOR for abd washouts.   Admitted 6/30 - 8/2 for feculent peritonitis with septic shock secondary to perforated diverticulitis   Repeat CT A/P 8/19 with extraluminal contrast consistent with dehiscence of the sigmoid colon stump. IR Drain Cultures with E. coli, AHS and Bacteroides.  CT with CT A/P (9/12)  with Left hemicolectomy with right lower quadrant colostomy and ventral abdominal wall dehiscence. Slight interval enlargement of the left paracolic gutter fluid collection measuring up to 3 x 3.9 x 10.2 cm (TR x AP x CC)  S/p IR upsize of drain 9/13/19.  Repeat bacterial cultures from drainage with ESBL E coli and enterococcus S amp  Blood cultures now with 2/4 bottles CoNS--likely contam  Pt with PICC line but repeat BCx with NGTD  Changed to meropenem, unclear that enterococcus needs to be treated for polymicrobial with predominant organism likely E coli  Overall, Intraabdominal Abscess, Rectal Stump Leak, CoNS Bacteremia, Leukocytosis (resolved).  - Meropenem 1g IV Q8H  - F/U pending cultures  - F/U surgery    José Miguel Cabral MD  Pager [phone]  After 5pm and on weekends call [phone]

## 2019-09-19 NOTE — PROGRESS NOTE ADULT - SUBJECTIVE AND OBJECTIVE BOX
CC: F/U for Intra abd infection    Saw/spoke to patient. No fevers, no chills. No new complaints. Unchanged.    Allergies  Coreg (Other)  digoxin (Other; Short breath (Mild to Mod))  IV Contrast (Unknown)  penicillins (Hives)    ANTIMICROBIALS:  meropenem  IVPB 1000 every 8 hours    PE:    Vital Signs Last 24 Hrs  T(C): 37.2 (19 Sep 2019 13:29), Max: 37.2 (19 Sep 2019 09:19)  T(F): 98.9 (19 Sep 2019 13:29), Max: 99 (19 Sep 2019 09:19)  HR: 100 (19 Sep 2019 13:29) (97 - 105)  BP: 109/73 (19 Sep 2019 13:29) (91/64 - 130/61)  RR: 18 (19 Sep 2019 13:29) (18 - 18)  SpO2: 95% (19 Sep 2019 13:29) (94% - 96%)    Gen: AOx3, NAD, non-toxic, pleasant  CV: S1+S2 normal, nontachycardic  Resp: Clear bilat, no resp distress, no crackles/wheezes  Abd: Soft, nontender, +BS, ostomy  Ext: No LE edema, no wounds    LABS:    No new available    MICROBIOLOGY:    .Blood  09-16-19   No growth to date.    .Blood  09-15-19   No growth to date.    .Abscess  09-13-19   Numerous Escherichia coli ESBL  Numerous Enterococcus faecalis  --  Escherichia coli ESBL  Enterococcus faecalis    .Blood  09-12-19   Growth in aerobic bottle: Staphylococcus epidermidis  --  Coag Negative Staphylococcus    .Surgical Swab  08-30-19   Few Escherichia coli  Rare Enterococcus faecalis  Rare Candida albicans "Susceptibilities not performed"  --  Escherichia coli  Enterococcus faecalis    (otherwise reviewed)    RADIOLOGY:    9/13 CXR:    FINDINGS:     Cardiac silhouette normal in size. Lungs are clear. No pleural effusion   or pneumothorax. Left-sided defibrillator. Right-sided PICC with tip   overlying right atrium.    IMPRESSION:   Clear lungs.

## 2019-09-19 NOTE — PROGRESS NOTE ADULT - SUBJECTIVE AND OBJECTIVE BOX
No acute events overnight, tachycardic to 105, likely 2/2 known anemia, does not want transfusion, hematology consulted.  IR drain #2 pulled yesterday, vac changed    SUBJECTIVE:  Reports no pain  Denies nausea, vomiting, diarrhea  Is passing gas and having bowel movements in ostomy  Tolerating diet      OBJECTIVE:  Vital Signs Last 24 Hrs  T(C): 36.4 (19 Sep 2019 01:16), Max: 37.2 (18 Sep 2019 05:01)  T(F): 97.5 (19 Sep 2019 01:16), Max: 99 (18 Sep 2019 05:01)  HR: 105 (19 Sep 2019 01:16) (97 - 910)  BP: 113/72 (19 Sep 2019 01:16) (91/64 - 130/61)  BP(mean): --  RR: 18 (19 Sep 2019 01:16) (18 - 18)  SpO2: 95% (19 Sep 2019 01:16) (95% - 96%)    I&O's Detail    17 Sep 2019 07:01  -  18 Sep 2019 07:00  --------------------------------------------------------  IN:    dextrose 5% + sodium chloride 0.9%: 270 mL    IV PiggyBack: 250 mL    Oral Fluid: 505 mL  Total IN: 1025 mL    OUT:    Colostomy: 150 mL    Other: 50 mL    Voided: 340 mL  Total OUT: 540 mL    Total NET: 485 mL      18 Sep 2019 07:01  -  19 Sep 2019 04:04  --------------------------------------------------------  IN:    Oral Fluid: 240 mL  Total IN: 240 mL    OUT:    Colostomy: 200 mL    Voided: 950 mL  Total OUT: 1150 mL    Total NET: -910 mL          Inpatient Medications:  MEDICATIONS  (STANDING):  apixaban 5 milliGRAM(s) Oral every 12 hours  atorvastatin 20 milliGRAM(s) Oral at bedtime  chlorhexidine 2% Cloths 1 Application(s) Topical daily  dextrose 5%. 1000 milliLiter(s) (50 mL/Hr) IV Continuous <Continuous>  dextrose 50% Injectable 12.5 Gram(s) IV Push once  dextrose 50% Injectable 25 Gram(s) IV Push once  dextrose 50% Injectable 25 Gram(s) IV Push once  enalapril 5 milliGRAM(s) Oral daily  famotidine    Tablet 20 milliGRAM(s) Oral daily  furosemide    Tablet 40 milliGRAM(s) Oral daily  insulin lispro (HumaLOG) corrective regimen sliding scale   SubCutaneous three times a day before meals  insulin lispro (HumaLOG) corrective regimen sliding scale   SubCutaneous at bedtime  magnesium oxide 400 milliGRAM(s) Oral three times a day with meals  meropenem  IVPB 1000 milliGRAM(s) IV Intermittent every 8 hours  montelukast 10 milliGRAM(s) Oral daily  nystatin Powder 1 Application(s) Topical two times a day  spironolactone 25 milliGRAM(s) Oral daily  tiotropium 18 MICROgram(s) Capsule 1 Capsule(s) Inhalation daily    MEDICATIONS  (PRN):  ALBUTerol    0.083% 2.5 milliGRAM(s) Nebulizer every 6 hours PRN Shortness of Breath and/or Wheezing  dextrose 40% Gel 15 Gram(s) Oral once PRN Blood Glucose LESS THAN 70 milliGRAM(s)/deciliter  glucagon  Injectable 1 milliGRAM(s) IntraMuscular once PRN Glucose LESS THAN 70 milligrams/deciliter      PHYSICAL EXAM:  General: well developed, NAD  Neuro: alert and oriented, no focal deficits, moves all extremities spontaneously  Respiratory: airway patent, respirations unlabored  CVS: regular rate and rhythm  Abdomen: soft, midline abdominal wound dressed with vac c/d/i, infraumbilical pouch draining pus, ostomy pink/viable, Abdominal wound healing well with granulation tissue covering  Extremities: no edema, sensation and movement grossly intact    LABS:              CULTURES:  .Blood  09-16 @ 12:08   No growth to date.  --  --      .Blood  09-15 @ 23:34   No growth to date.  --  --      .Abscess  09-13 @ 22:59   Numerous Escherichia coli ESBL  Numerous Enterococcus faecalis  --  Escherichia coli ESBL  Enterococcus faecalis      .Blood  09-12 @ 22:49   Growth in aerobic bottle: Staphylococcus epidermidis  --  Coag Negative Staphylococcus      BLOOD PERIPHERAL  09-03 @ 07:17 --    NO ORGANISMS ISOLATED  --      .Blood  08-30 @ 18:43   No growth at 5 days.  --  --      .Surgical Swab  08-30 @ 18:42   Few Escherichia coli  Rare Enterococcus faecalis  Rare Candida albicans "Susceptibilities not performed"  --  Escherichia coli  Enterococcus faecalis      .Blood  08-30 @ 18:00   No growth at 5 days.  --  --      .Body Fluid right abdomen wall  08-16 @ 15:21   No growth at 5 days  --    Few polymorphonuclear leukocytes per low power field  No organisms seen per oil power field      .Body Fluid left retroperitoneal  08-16 @ 15:18   Numerous Escherichia coli  Numerous Alpha hemolytic strep "Susceptibilities not performed"  Moderate Bacteroides thetaiotaomicron "Susceptibilities not performed"  Few Enterococcus faecalis  --  Escherichia coli  Enterococcus faecalis      .Blood  08-16 @ 13:08   No growth at 5 days.  --  --      .Blood  08-14 @ 17:44   Growth in aerobic bottle: Coag Negative Staphylococcus  Single set isolate, possible contaminant. Contact  Microbiology if susceptibility testing clinically  indicated.  "Due to technical problems, Proteus sp. will Not be reported as part of  the BCID panel until further notice"  ***Blood Panel PCR results on this specimen are available  approximately 3 hours after the Gram stain result.***  Gram stain, PCR, and/or culture results may not always  correspond due to difference in methodologies.  ************************************************************  This PCR assay was performed using [employer].  The following targets are tested for: Enterococcus,  vancomycin resistant enterococci, Listeria monocytogenes,  coagulase negative staphylococci, S. aureus,  methicillin resistant S. aureus, Streptococcus agalactiae  (Group B), S. pneumoniae, S. pyogenes (Group A),  Acinetobacter baumannii, Enterobacter cloacae, E. coli,  Klebsiella oxytoca, K. pneumoniae, Proteus sp.,  Serratia marcescens, Haemophilus influenzae,  Neisseria meningitidis, Pseudomonas aeruginosa, Candida  albicans, C. glabrata, C krusei, C parapsilosis,  C. tropicalis and the KPC resistance gene.  --  Blood Culture PCR      .Sputum  07-29 @ 04:17   Moderate Pseudomonas aeruginosa (Carbapenem Resistant) #2  Normal Respiratory Liz present  --  Pseudomonas aeruginosa (Carbapenem Resistant)      .Urine  07-24 @ 01:06   No growth  --  --      .Body Fluid  07-23 @ 13:26   Numerous Escherichia coli  Few Coag Negative Staphylococcus  Numerous Escherichia coli #2  --  Escherichia coli  Escherichia coli      .Blood  07-10 @ 20:09   No growth at 5 days.  --  --      .Blood  07-10 @ 20:08   No growth at 5 days.  --  --      .Body Fluid abdominal fluid  07-09 @ 17:54   Few Enterococcus faecalis  Few Coag Negative Staphylococcus  --  Enterococcus faecalis  Coag Negative Staphylococcus      .Body Fluid abdominal fluid  07-09 @ 17:46   Few Enterococcus faecalis  Few Coag Negative Staphylococcus  --  Enterococcus faecalis  Coag Negative Staphylococcus      .Surgical Swab None Specimen Source:  abdomen, esw  07-02 @ 10:15   Moderate Escherichia coli  Moderate Alpha hemolytic strep "Susceptibilities not performed"  Moderate Strep mitis oralis group "Susceptibilities not performed"  Growth in fluid media only Enterococcus faecalis  --  Escherichia coli  Enterococcus faecalis      .Blood  07-01 @ 10:04   No growth at 5 days.  --  --      .Urine  07-01 @ 10:03   No growth  --  --

## 2019-09-19 NOTE — PROGRESS NOTE ADULT - ATTENDING COMMENTS
I have seen and evaluated the patient and discussed the relevant clinical findings and plan with the surgical housestaff and fellow.  Agree with the above documentation with addenda as noted.     Plastic surgery recommending skin graft of wound  Discussed with patient, who requests I speak to her daughter, whom I will contact later today    Clark Alvarado MD

## 2019-09-20 LAB
ANION GAP SERPL CALC-SCNC: 13 MMOL/L — SIGNIFICANT CHANGE UP (ref 5–17)
BUN SERPL-MCNC: 5 MG/DL — LOW (ref 7–23)
CALCIUM SERPL-MCNC: 8.1 MG/DL — LOW (ref 8.4–10.5)
CHLORIDE SERPL-SCNC: 99 MMOL/L — SIGNIFICANT CHANGE UP (ref 96–108)
CO2 SERPL-SCNC: 24 MMOL/L — SIGNIFICANT CHANGE UP (ref 22–31)
CREAT SERPL-MCNC: 0.45 MG/DL — LOW (ref 0.5–1.3)
GLUCOSE SERPL-MCNC: 119 MG/DL — HIGH (ref 70–99)
HCT VFR BLD CALC: 25.7 % — LOW (ref 34.5–45)
HGB BLD-MCNC: 8 G/DL — LOW (ref 11.5–15.5)
MAGNESIUM SERPL-MCNC: 1.5 MG/DL — LOW (ref 1.6–2.6)
MCHC RBC-ENTMCNC: 30.1 PG — SIGNIFICANT CHANGE UP (ref 27–34)
MCHC RBC-ENTMCNC: 31.1 GM/DL — LOW (ref 32–36)
MCV RBC AUTO: 96.6 FL — SIGNIFICANT CHANGE UP (ref 80–100)
PHOSPHATE SERPL-MCNC: 2.4 MG/DL — LOW (ref 2.5–4.5)
PLATELET # BLD AUTO: 235 K/UL — SIGNIFICANT CHANGE UP (ref 150–400)
POTASSIUM SERPL-MCNC: 3.9 MMOL/L — SIGNIFICANT CHANGE UP (ref 3.5–5.3)
POTASSIUM SERPL-SCNC: 3.9 MMOL/L — SIGNIFICANT CHANGE UP (ref 3.5–5.3)
RBC # BLD: 2.66 M/UL — LOW (ref 3.8–5.2)
RBC # FLD: 21 % — HIGH (ref 10.3–14.5)
SODIUM SERPL-SCNC: 136 MMOL/L — SIGNIFICANT CHANGE UP (ref 135–145)
WBC # BLD: 6.49 K/UL — SIGNIFICANT CHANGE UP (ref 3.8–10.5)
WBC # FLD AUTO: 6.49 K/UL — SIGNIFICANT CHANGE UP (ref 3.8–10.5)

## 2019-09-20 PROCEDURE — 99232 SBSQ HOSP IP/OBS MODERATE 35: CPT | Mod: 24

## 2019-09-20 PROCEDURE — 74177 CT ABD & PELVIS W/CONTRAST: CPT | Mod: 26

## 2019-09-20 PROCEDURE — 99232 SBSQ HOSP IP/OBS MODERATE 35: CPT

## 2019-09-20 PROCEDURE — 99233 SBSQ HOSP IP/OBS HIGH 50: CPT

## 2019-09-20 RX ORDER — DIPHENHYDRAMINE HCL 50 MG
50 CAPSULE ORAL ONCE
Refills: 0 | Status: COMPLETED | OUTPATIENT
Start: 2019-09-20 | End: 2019-09-20

## 2019-09-20 RX ORDER — APIXABAN 2.5 MG/1
5 TABLET, FILM COATED ORAL EVERY 12 HOURS
Refills: 0 | Status: COMPLETED | OUTPATIENT
Start: 2019-09-20 | End: 2019-09-20

## 2019-09-20 RX ORDER — MAGNESIUM SULFATE 500 MG/ML
2 VIAL (ML) INJECTION ONCE
Refills: 0 | Status: COMPLETED | OUTPATIENT
Start: 2019-09-20 | End: 2019-09-20

## 2019-09-20 RX ORDER — DIPHENHYDRAMINE HCL 50 MG
50 CAPSULE ORAL ONCE
Refills: 0 | Status: COMPLETED | OUTPATIENT
Start: 2019-09-20 | End: 2020-08-18

## 2019-09-20 RX ORDER — HYDROCORTISONE 20 MG
200 TABLET ORAL ONCE
Refills: 0 | Status: COMPLETED | OUTPATIENT
Start: 2019-09-20 | End: 2019-09-20

## 2019-09-20 RX ORDER — SODIUM,POTASSIUM PHOSPHATES 278-250MG
1 POWDER IN PACKET (EA) ORAL ONCE
Refills: 0 | Status: COMPLETED | OUTPATIENT
Start: 2019-09-20 | End: 2019-09-20

## 2019-09-20 RX ADMIN — Medication 50 GRAM(S): at 11:20

## 2019-09-20 RX ADMIN — CHLORHEXIDINE GLUCONATE 1 APPLICATION(S): 213 SOLUTION TOPICAL at 12:51

## 2019-09-20 RX ADMIN — SPIRONOLACTONE 25 MILLIGRAM(S): 25 TABLET, FILM COATED ORAL at 05:50

## 2019-09-20 RX ADMIN — MAGNESIUM OXIDE 400 MG ORAL TABLET 400 MILLIGRAM(S): 241.3 TABLET ORAL at 12:45

## 2019-09-20 RX ADMIN — ATORVASTATIN CALCIUM 20 MILLIGRAM(S): 80 TABLET, FILM COATED ORAL at 22:57

## 2019-09-20 RX ADMIN — TIOTROPIUM BROMIDE 1 CAPSULE(S): 18 CAPSULE ORAL; RESPIRATORY (INHALATION) at 12:46

## 2019-09-20 RX ADMIN — Medication 1: at 12:45

## 2019-09-20 RX ADMIN — MEROPENEM 100 MILLIGRAM(S): 1 INJECTION INTRAVENOUS at 22:56

## 2019-09-20 RX ADMIN — APIXABAN 5 MILLIGRAM(S): 2.5 TABLET, FILM COATED ORAL at 17:51

## 2019-09-20 RX ADMIN — MAGNESIUM OXIDE 400 MG ORAL TABLET 400 MILLIGRAM(S): 241.3 TABLET ORAL at 22:56

## 2019-09-20 RX ADMIN — MEROPENEM 100 MILLIGRAM(S): 1 INJECTION INTRAVENOUS at 05:34

## 2019-09-20 RX ADMIN — MAGNESIUM OXIDE 400 MG ORAL TABLET 400 MILLIGRAM(S): 241.3 TABLET ORAL at 09:47

## 2019-09-20 RX ADMIN — NYSTATIN CREAM 1 APPLICATION(S): 100000 CREAM TOPICAL at 17:41

## 2019-09-20 RX ADMIN — MONTELUKAST 10 MILLIGRAM(S): 4 TABLET, CHEWABLE ORAL at 12:45

## 2019-09-20 RX ADMIN — NYSTATIN CREAM 1 APPLICATION(S): 100000 CREAM TOPICAL at 05:34

## 2019-09-20 RX ADMIN — APIXABAN 5 MILLIGRAM(S): 2.5 TABLET, FILM COATED ORAL at 05:33

## 2019-09-20 RX ADMIN — Medication 40 MILLIGRAM(S): at 05:50

## 2019-09-20 RX ADMIN — Medication 1 PACKET(S): at 11:21

## 2019-09-20 RX ADMIN — Medication 5 MILLIGRAM(S): at 05:50

## 2019-09-20 RX ADMIN — Medication 200 MILLIGRAM(S): at 15:25

## 2019-09-20 RX ADMIN — MEROPENEM 100 MILLIGRAM(S): 1 INJECTION INTRAVENOUS at 15:39

## 2019-09-20 RX ADMIN — Medication 50 MILLIGRAM(S): at 17:41

## 2019-09-20 RX ADMIN — FAMOTIDINE 20 MILLIGRAM(S): 10 INJECTION INTRAVENOUS at 12:45

## 2019-09-20 NOTE — CHART NOTE - NSCHARTNOTEFT_GEN_A_CORE
Hematology team was notified by primary team that patient is planned for possible OR Monday with plastic surgery. Patient's labs were reviewed. Patient's iron studies are c/w AoCD and she has adequate iron stores (Ferritin = 1221 on 9/16). Patient was given Epogen 40,000U x1 on 9/17. Her Hgb is improved this AM (8.0). No further Heme intervention needed prior to possible Surgery on Monday. Please continue to use pediatric vials to obtain lab work and minimize blood draws as much as possible.    Please call Hematology PRN with any issues.    Doug Miguel, PGY4  Hematology-Oncology Fellow  Pager: [phone]

## 2019-09-20 NOTE — PROGRESS NOTE ADULT - SUBJECTIVE AND OBJECTIVE BOX
CC: F/U Abscess    Saw/spoke to patient. No fevers, no chills. No new complaints. Unchanged.    Allergies  Coreg (Other)  digoxin (Other; Short breath (Mild to Mod))  IV Contrast (Unknown)  penicillins (Hives)    ANTIMICROBIALS:  meropenem  IVPB 1000 every 8 hours    PE:    Vital Signs Last 24 Hrs  T(C): 37.2 (20 Sep 2019 09:18), Max: 37.2 (19 Sep 2019 13:29)  T(F): 98.9 (20 Sep 2019 09:18), Max: 98.9 (19 Sep 2019 13:29)  HR: 100 (20 Sep 2019 09:18) (82 - 106)  BP: 127/74 (20 Sep 2019 09:18) (109/73 - 127/74)  RR: 18 (20 Sep 2019 09:18) (18 - 18)  SpO2: 95% (20 Sep 2019 09:18) (95% - 98%)    Gen: AOx3, NAD, non-toxic, pleasant  CV: S1+S2 normal, nontachycardic  Resp: Clear bilat, no resp distress, no crackles/wheezes  Abd: Soft, nontender, +BS  Ext: No LE edema, no wounds    LABS:                        8.0    6.49  )-----------( 235      ( 20 Sep 2019 09:58 )             25.7     09-20    136  |  99  |  5<L>  ----------------------------<  119<H>  3.9   |  24  |  0.45<L>    Ca    8.1<L>      20 Sep 2019 07:19  Phos  2.4     09-20  Mg     1.5     09-20    MICROBIOLOGY:    .Blood  09-16-19   No growth to date.     .Blood  09-15-19   No growth to date.     .Abscess  09-13-19   Numerous Escherichia coli ESBL  Numerous Enterococcus faecalis  --  Escherichia coli ESBL  Enterococcus faecalis    .Blood  09-12-19   Growth in aerobic bottle: Staphylococcus epidermidis  --  Coag Negative Staphylococcus    (otherwise reviewed)    RADIOLOGY:    9/13 CXR:    FINDINGS:     Cardiac silhouette normal in size. Lungs are clear. No pleural effusion   or pneumothorax. Left-sided defibrillator. Right-sided PICC with tip   overlying right atrium.    IMPRESSION:   Clear lungs.

## 2019-09-20 NOTE — PROGRESS NOTE ADULT - SUBJECTIVE AND OBJECTIVE BOX
No acute events overnight    SUBJECTIVE:  Reports no pain  Denies nausea, vomiting, diarrhea. Requested Glucerna overnight  Is passing gas and having bowel movements  Tolerating diet  Ambulating independently    OBJECTIVE:  Vital Signs Last 24 Hrs  T(C): 36.7 (20 Sep 2019 01:35), Max: 37.2 (19 Sep 2019 09:19)  T(F): 98.1 (20 Sep 2019 01:35), Max: 99 (19 Sep 2019 09:19)  HR: 101 (20 Sep 2019 01:35) (97 - 101)  BP: 119/80 (20 Sep 2019 01:35) (106/70 - 123/71)  BP(mean): --  RR: 18 (20 Sep 2019 01:35) (18 - 18)  SpO2: 96% (20 Sep 2019 01:35) (94% - 96%)    I&O's Detail    18 Sep 2019 07:01  -  19 Sep 2019 07:00  --------------------------------------------------------  IN:    Oral Fluid: 240 mL  Total IN: 240 mL    OUT:    Colostomy: 200 mL    Voided: 950 mL  Total OUT: 1150 mL    Total NET: -910 mL      19 Sep 2019 07:01  -  20 Sep 2019 04:17  --------------------------------------------------------  IN:    IV PiggyBack: 50 mL    Oral Fluid: 380 mL  Total IN: 430 mL    OUT:    Colostomy: 175 mL    Voided: 250 mL  Total OUT: 425 mL    Total NET: 5 mL          Inpatient Medications:  MEDICATIONS  (STANDING):  apixaban 5 milliGRAM(s) Oral every 12 hours  atorvastatin 20 milliGRAM(s) Oral at bedtime  chlorhexidine 2% Cloths 1 Application(s) Topical daily  dextrose 5%. 1000 milliLiter(s) (50 mL/Hr) IV Continuous <Continuous>  dextrose 50% Injectable 12.5 Gram(s) IV Push once  dextrose 50% Injectable 25 Gram(s) IV Push once  dextrose 50% Injectable 25 Gram(s) IV Push once  enalapril 5 milliGRAM(s) Oral daily  famotidine    Tablet 20 milliGRAM(s) Oral daily  furosemide    Tablet 40 milliGRAM(s) Oral daily  insulin lispro (HumaLOG) corrective regimen sliding scale   SubCutaneous three times a day before meals  insulin lispro (HumaLOG) corrective regimen sliding scale   SubCutaneous at bedtime  magnesium oxide 400 milliGRAM(s) Oral three times a day with meals  meropenem  IVPB 1000 milliGRAM(s) IV Intermittent every 8 hours  montelukast 10 milliGRAM(s) Oral daily  nystatin Powder 1 Application(s) Topical two times a day  nystatin Powder 1 Application(s) Topical once  spironolactone 25 milliGRAM(s) Oral daily  tiotropium 18 MICROgram(s) Capsule 1 Capsule(s) Inhalation daily    MEDICATIONS  (PRN):  ALBUTerol    0.083% 2.5 milliGRAM(s) Nebulizer every 6 hours PRN Shortness of Breath and/or Wheezing  dextrose 40% Gel 15 Gram(s) Oral once PRN Blood Glucose LESS THAN 70 milliGRAM(s)/deciliter  glucagon  Injectable 1 milliGRAM(s) IntraMuscular once PRN Glucose LESS THAN 70 milligrams/deciliter      PHYSICAL EXAM:  General: well developed, NAD  Neuro: alert and oriented, no focal deficits, moves all extremities spontaneously  Respiratory: airway patent, respirations unlabored  CVS: regular rate and rhythm  Abdomen: soft, midline abdominal wound dressed with vac c/d/i, infraumbilical pouch draining pus, ostomy pink/viable, Abdominal wound healing well with granulation tissue covering  Extremities: no edema, sensation and movement grossly intact    LABS:              CULTURES:  .Blood  09-16 @ 12:08   No growth to date.  --  --      .Blood  09-15 @ 23:34   No growth to date.  --  --      .Abscess  09-13 @ 22:59   Numerous Escherichia coli ESBL  Numerous Enterococcus faecalis  --  Escherichia coli ESBL  Enterococcus faecalis      .Blood  09-12 @ 22:49   Growth in aerobic bottle: Staphylococcus epidermidis  --  Coag Negative Staphylococcus      BLOOD PERIPHERAL  09-03 @ 07:17 --    NO ORGANISMS ISOLATED  --      .Blood  08-30 @ 18:43   No growth at 5 days.  --  --      .Surgical Swab  08-30 @ 18:42   Few Escherichia coli  Rare Enterococcus faecalis  Rare Candida albicans "Susceptibilities not performed"  --  Escherichia coli  Enterococcus faecalis      .Blood  08-30 @ 18:00   No growth at 5 days.  --  --      .Body Fluid right abdomen wall  08-16 @ 15:21   No growth at 5 days  --    Few polymorphonuclear leukocytes per low power field  No organisms seen per oil power field      .Body Fluid left retroperitoneal  08-16 @ 15:18   Numerous Escherichia coli  Numerous Alpha hemolytic strep "Susceptibilities not performed"  Moderate Bacteroides thetaiotaomicron "Susceptibilities not performed"  Few Enterococcus faecalis  --  Escherichia coli  Enterococcus faecalis      .Blood  08-16 @ 13:08   No growth at 5 days.  --  --      .Blood  08-14 @ 17:44   Growth in aerobic bottle: Coag Negative Staphylococcus  Single set isolate, possible contaminant. Contact  Microbiology if susceptibility testing clinically  indicated.  "Due to technical problems, Proteus sp. will Not be reported as part of  the BCID panel until further notice"  ***Blood Panel PCR results on this specimen are available  approximately 3 hours after the Gram stain result.***  Gram stain, PCR, and/or culture results may not always  correspond due to difference in methodologies.  ************************************************************  This PCR assay was performed using [employer].  The following targets are tested for: Enterococcus,  vancomycin resistant enterococci, Listeria monocytogenes,  coagulase negative staphylococci, S. aureus,  methicillin resistant S. aureus, Streptococcus agalactiae  (Group B), S. pneumoniae, S. pyogenes (Group A),  Acinetobacter baumannii, Enterobacter cloacae, E. coli,  Klebsiella oxytoca, K. pneumoniae, Proteus sp.,  Serratia marcescens, Haemophilus influenzae,  Neisseria meningitidis, Pseudomonas aeruginosa, Candida  albicans, C. glabrata, C krusei, C parapsilosis,  C. tropicalis and the KPC resistance gene.  --  Blood Culture PCR      .Sputum  07-29 @ 04:17   Moderate Pseudomonas aeruginosa (Carbapenem Resistant) #2  Normal Respiratory Liz present  --  Pseudomonas aeruginosa (Carbapenem Resistant)      .Urine  07-24 @ 01:06   No growth  --  --      .Body Fluid  07-23 @ 13:26   Numerous Escherichia coli  Few Coag Negative Staphylococcus  Numerous Escherichia coli #2  --  Escherichia coli  Escherichia coli      .Blood  07-10 @ 20:09   No growth at 5 days.  --  --      .Blood  07-10 @ 20:08   No growth at 5 days.  --  --      .Body Fluid abdominal fluid  07-09 @ 17:54   Few Enterococcus faecalis  Few Coag Negative Staphylococcus  --  Enterococcus faecalis  Coag Negative Staphylococcus      .Body Fluid abdominal fluid  07-09 @ 17:46   Few Enterococcus faecalis  Few Coag Negative Staphylococcus  --  Enterococcus faecalis  Coag Negative Staphylococcus      .Surgical Swab None Specimen Source:  abdomen, esw  07-02 @ 10:15   Moderate Escherichia coli  Moderate Alpha hemolytic strep "Susceptibilities not performed"  Moderate Strep mitis oralis group "Susceptibilities not performed"  Growth in fluid media only Enterococcus faecalis  --  Escherichia coli  Enterococcus faecalis      .Blood  07-01 @ 10:04   No growth at 5 days.  --  --      .Urine  07-01 @ 10:03   No growth  --  --          IMAGING:  []

## 2019-09-20 NOTE — CONSULT NOTE ADULT - SUBJECTIVE AND OBJECTIVE BOX
Seen and evaluated patient for tentative operation Monday. Compelte note to follow.     Andrew Morales MD, MPH, PATRICK, RPVI, FACC  Cardiovascular Specialist   [facility]  c: [phone]  e: [email or web address] [id number]    CHIEF COMPLAINT:  Possible dislodgement of IR drain, increased purulent output.    HISTORY OF PRESENT ILLNESS:  ANDRE BARKER is a 74y Female patient with past medical history of asthma, atrial fibrillation on apixaban, T2DM, HFrEF, AICD, and perforated diverticulitis s/p left hemicolectomy and end colostomy with multiple washouts and abdominal wall closure (last surgery on 2019) presenting with possible dislodgement of IR drain and increased purulent output from her abdomen. A CT A+P was obtained which revealed a slight interval enlargement of the left paracolic gutter fluid collection measuring up to 3x3.9x10.2cm. She underwent IR upsizing of intraabdominal drain by IR on  with low draining. Fluid cultures drawn at that time returned positive for ESBL E. coli and Enterococcus faecalis therefore started on IV antiobiotics. Tentative plans for OR by plastic surgery. Cardiology consulted for optimization.     Allergies  Coreg (Other)  digoxin (Other; Short breath (Mild to Mod))  IV Contrast (Unknown)  penicillins (Hives)    Intolerances  metoprolol (Other)  Solu-Medrol (Other (Mild to Mod))  	  PAST MEDICAL & SURGICAL HISTORY:  Refusal of blood transfusions as patient is Taoist  Patient is Taoist  Vertigo  Atrial flutter  Diverticulitis  Cardiomyopathy  Kidney stone  Cardiac Pacemaker  HTN - Hypertension  Gout  Diabetes  Congestive Heart Failure  Asthma  Status post left hemicolectomy  S/P cholecystectomy  AICD (Automatic Cardioverter/Defibrillator) Present: inserted in Aug, 2008. Due for battery change in 1 month. ( ciValue) . Inserted by Dr Duffy    FAMILY HISTORY:  Family history of brain tumor    SOCIAL HISTORY:    Non-smoker    REVIEW OF SYSTEMS:  CONSTITUTIONAL: No fever, Positive fatigue  EYES: No eye pain.  ENMT:  No difficulty hearing, tinnitus.  NECK: No pain or stiffness.  RESPIRATORY: No cough, wheezing, chills; No Shortness of Breath  CARDIOVASCULAR: No chest pain, palpitations, passing out.  GASTROINTESTINAL: No abdominal or epigastric pain. No nausea, vomiting.  NEUROLOGICAL: No headaches, memory loss.  MUSCULOSKELETAL: No muscle or extremity pain  PSYCHIATRIC: No depression, anxiety.  HEME/LYMPH: No easy bruising.    I&O's Summary    21 Sep 2019 07:01  -  22 Sep 2019 07:00  --------------------------------------------------------  IN: 590 mL / OUT: 950 mL / NET: -360 mL    22 Sep 2019 07:01  -  22 Sep 2019 19:53  --------------------------------------------------------  IN: 445 mL / OUT: 1350 mL / NET: -905 mL    PHYSICAL EXAM:  Vital Signs Last 24 Hrs  T(C): 37.1 (22 Sep 2019 17:02), Max: 37.2 (22 Sep 2019 05:58)  T(F): 98.7 (22 Sep 2019 17:02), Max: 99 (22 Sep 2019 05:58)  HR: 92 (22 Sep 2019 17:02) (83 - 109)  BP: 127/69 (22 Sep 2019 17:02) (100/63 - 127/69)  RR: 18 (22 Sep 2019 17:02) (18 - 19)  SpO2: 95% (22 Sep 2019 17:02) (93% - 95%)    Appearance: Normal	  HEENT:   Normal oral mucosa. 	  Cardiovascular: Normal S1 S2.  Respiratory: Lungs clear to auscultation	  Psychiatry: A & O x 3.  Gastrointestinal:  Soft, Mildly tender.  Skin: No rashes.	  Neurologic: Non-focal  Extremities: No clubbing, cyanosis or edema  Vascular: Peripheral pulses palpable 2+ bilaterally    MEDICATIONS:  MEDICATIONS  (STANDING):  atorvastatin 20 milliGRAM(s) Oral at bedtime  chlorhexidine 2% Cloths 1 Application(s) Topical daily  dextrose 5%. 1000 milliLiter(s) (50 mL/Hr) IV Continuous <Continuous>  dextrose 50% Injectable 12.5 Gram(s) IV Push once  dextrose 50% Injectable 25 Gram(s) IV Push once  dextrose 50% Injectable 25 Gram(s) IV Push once  enalapril 5 milliGRAM(s) Oral daily  famotidine    Tablet 20 milliGRAM(s) Oral daily  furosemide    Tablet 40 milliGRAM(s) Oral daily  insulin lispro (HumaLOG) corrective regimen sliding scale   SubCutaneous three times a day before meals  insulin lispro (HumaLOG) corrective regimen sliding scale   SubCutaneous at bedtime  magnesium oxide 400 milliGRAM(s) Oral three times a day with meals  meropenem  IVPB 1000 milliGRAM(s) IV Intermittent every 8 hours  montelukast 10 milliGRAM(s) Oral daily  nystatin Powder 1 Application(s) Topical two times a day  spironolactone 25 milliGRAM(s) Oral daily  tiotropium 18 MICROgram(s) Capsule 1 Capsule(s) Inhalation daily    MEDICATIONS  (PRN):  ALBUTerol    0.083% 2.5 milliGRAM(s) Nebulizer every 6 hours PRN Shortness of Breath and/or Wheezing  dextrose 40% Gel 15 Gram(s) Oral once PRN Blood Glucose LESS THAN 70 milliGRAM(s)/deciliter  glucagon  Injectable 1 milliGRAM(s) IntraMuscular once PRN Glucose LESS THAN 70 milligrams/deciliter    EC2019  A sensed, v paced.     CARDIAC TESTING/STUDIES:    Echocardiogram: 2019  CONCLUSIONS:  Technically difficult study with limited diagnostic  information.  1. No mitral valve regurgitation seen.  2. Normal left ventricular internal dimensions and wall  thicknesses.  3. Endocardium not well visualized; Precludes evaluation of  regional wall motion and quantitative LVEF.  Can consider  repeating with Definity or alternate imaging modality to  evaluate further if clinically indicated.  The left  ventricular function appears abnormal.  4. Normal right ventricular size and function.  There is a  wire/device seen in the right heart.  5. The tricuspid valve is not well seen.  6. The atrial sizes are not well assessed.  7. There is no pericardial effusion seen.    Cardiac Cath 3/8/2018  PROCEDURE:  --  Left heart catheterization.  --  Left coronary angiography.  --  Right coronary angiography.    CORONARY VESSELS: The coronary circulation is right dominant.  LM:   --  LM: Normal.  LAD:   --  LAD: Normal.  CX:   --  Circumflex: Normal.  RCA:   --  RCA: Normal.    COMPLICATIONS: There were no complications.    DIAGNOSTIC RECOMMENDATIONS: The patient should continue with the present  medications.    ASSESSMENT/PLAN:   74y Female patient with past medical history of asthma, atrial fibrillation on apixaban, T2DM, HFrEF, AICD, and perforated diverticulitis s/p left hemicolectomy and end colostomy with multiple washouts and abdominal wall closure (last surgery on 2019) presenting with possible dislodgement of IR drain and increased purulent output from her abdomen s/p upsizing by IR 2019 and tentative plans for OR by plastic surgery.     1) Preoperative Optimization   RCRI; 1; 6% 30 day risk of death, MI, cardiac arrest.   METS <4  Intermediate risk operation.   No absolute contraindications including active chest pain, decompensated heart failure, and/or life-threatening arrythmia.   Tolerated multiple operations.  Cath 2018 with normal coronaries; no CAD.     ·	No additional cardiac testing indicated.   ·	Continue medical management with atorvastatin 20 mg nightly, spironolactone 25 mg daily, enalapril 5 mg daily, furosemide 40 mg daily.     2) pAF  Elevated CHADS2-Vasc; indicated for A/C.  Rate controlled.     ·	Holding A/C in the setting of tentative operation tomorrow.     3) HFrEF, chronic  Unclear EF but LV dysfunction noted.   Compensated.     ·	Continue medical management with enalapril 5 mg daily, spironolactone 25 mg daily, furosemide 40 mg daily. Unclear why she is intolerant of metoprolol.    Andrew Morales MD, MPH, PATRICK  Cardiovascular Specialist Attending  [facility]  C: [phone]  E: [email or web address]

## 2019-09-20 NOTE — PROGRESS NOTE ADULT - ASSESSMENT
75 yo Female with PMH of asthma, A fib, dm, HFrEF, pacemaker/AICD, perforated diverticulitis s/p L hemicolectomy and end colostomy w/ multiple washouts and abdominal wall closure w/ strattice (last surgery on 7/11), presents from [place] due to possible dislodgement of IR drain and increased purulent output from her abdomen. CT scan showing increase in size of L paracolic gutter fluid collection, however drains appear in place. POD 7 s/p IR upsizing, though continued low drain output. Wound vac placed over abdominal wound (9/16/2019).    - possible OR Monday with plastic surgery, will follow up  - repeat Blood Cultures  - continue home medications including anticoagulation  - Diet: soft, regular  - heart failure consult as pt has been followed by team in past  - Appreciate OBGYN consult: no interventions for labial lesion: warm compress  - Appreciate ID Consult: will Continue Ertapenem 1g IV Q24H; will continue Vancomycin 750mg IV Q12H. Trough prior to fourth dose  - Appreciate Heme/Onc Consult: Iron studies are c/w anemia of chronic inflammation. Given Procrit 40,000U x1 and use pediatric vials to obtain lab work. Will include follow-up with Hematology after discharge for further management.    [facility], [id number]

## 2019-09-20 NOTE — PROGRESS NOTE ADULT - ASSESSMENT
74 year old female PMH of HFrEF, s/p AICD, asthma, HTN, HLD, atrial fibrillation on eliquis, with previous perforated diverticulosis, Hartmans procedure with multiple RTOR for abd washouts.   Admitted 6/30 - 8/2 for feculent peritonitis with septic shock secondary to perforated diverticulitis   Repeat CT A/P 8/19 with extraluminal contrast consistent with dehiscence of the sigmoid colon stump. IR Drain Cultures with E. coli, AHS and Bacteroides.  CT with CT A/P (9/12)  with Left hemicolectomy with right lower quadrant colostomy and ventral abdominal wall dehiscence. Slight interval enlargement of the left paracolic gutter fluid collection measuring up to 3 x 3.9 x 10.2 cm (TR x AP x CC)  S/p IR upsize of drain 9/13/19.  Repeat bacterial cultures from drainage with ESBL E coli and enterococcus S amp  Blood cultures now with 2/4 bottles CoNS--likely contam; repeat BCX NGTD  On meropenem, unclear that enterococcus needs to be treated for polymicrobial with predominant organism likely E coli  Overall, Intraabdominal Abscess, Rectal Stump Leak, CoNS Bacteremia, Leukocytosis  - Meropenem 1g q 8  - F/U pending cultures  - F/U surgery    José Miguel Cabral MD  Pager [phone]  After 5pm and on weekends call [phone]

## 2019-09-21 PROCEDURE — 93306 TTE W/DOPPLER COMPLETE: CPT | Mod: 26

## 2019-09-21 RX ADMIN — MEROPENEM 100 MILLIGRAM(S): 1 INJECTION INTRAVENOUS at 14:13

## 2019-09-21 RX ADMIN — Medication 5 MILLIGRAM(S): at 05:49

## 2019-09-21 RX ADMIN — Medication 40 MILLIGRAM(S): at 05:49

## 2019-09-21 RX ADMIN — MEROPENEM 100 MILLIGRAM(S): 1 INJECTION INTRAVENOUS at 05:48

## 2019-09-21 RX ADMIN — TIOTROPIUM BROMIDE 1 CAPSULE(S): 18 CAPSULE ORAL; RESPIRATORY (INHALATION) at 14:11

## 2019-09-21 RX ADMIN — MAGNESIUM OXIDE 400 MG ORAL TABLET 400 MILLIGRAM(S): 241.3 TABLET ORAL at 09:24

## 2019-09-21 RX ADMIN — ATORVASTATIN CALCIUM 20 MILLIGRAM(S): 80 TABLET, FILM COATED ORAL at 21:56

## 2019-09-21 RX ADMIN — NYSTATIN CREAM 1 APPLICATION(S): 100000 CREAM TOPICAL at 17:28

## 2019-09-21 RX ADMIN — FAMOTIDINE 20 MILLIGRAM(S): 10 INJECTION INTRAVENOUS at 14:13

## 2019-09-21 RX ADMIN — MEROPENEM 100 MILLIGRAM(S): 1 INJECTION INTRAVENOUS at 21:53

## 2019-09-21 RX ADMIN — SPIRONOLACTONE 25 MILLIGRAM(S): 25 TABLET, FILM COATED ORAL at 05:49

## 2019-09-21 RX ADMIN — Medication 1: at 14:28

## 2019-09-21 RX ADMIN — MAGNESIUM OXIDE 400 MG ORAL TABLET 400 MILLIGRAM(S): 241.3 TABLET ORAL at 14:11

## 2019-09-21 RX ADMIN — CHLORHEXIDINE GLUCONATE 1 APPLICATION(S): 213 SOLUTION TOPICAL at 14:27

## 2019-09-21 RX ADMIN — NYSTATIN CREAM 1 APPLICATION(S): 100000 CREAM TOPICAL at 05:49

## 2019-09-21 RX ADMIN — MONTELUKAST 10 MILLIGRAM(S): 4 TABLET, CHEWABLE ORAL at 14:12

## 2019-09-21 RX ADMIN — MAGNESIUM OXIDE 400 MG ORAL TABLET 400 MILLIGRAM(S): 241.3 TABLET ORAL at 17:28

## 2019-09-21 NOTE — PROGRESS NOTE ADULT - ASSESSMENT
75 yo Female with PMH of asthma, A fib, dm, HFrEF, pacemaker/AICD, perforated diverticulitis s/p L hemicolectomy and end colostomy w/ multiple washouts and abdominal wall closure w/ strattice (last surgery on 7/11), presents from [place] due to possible dislodgement of IR drain and increased purulent output from her abdomen. CT scan showing increase in size of L paracolic gutter fluid collection, however drains appear in place. POD 8 s/p IR upsizing, though continued low drain output. Wound vac placed over abdominal wound (9/16/2019). CT A/P obtained 9/20/2019 preliminarily showing enteric contrast within the left peristomal subcutaneous fat and apparent communication to collection related to an episode of complicated diverticulitis. She has been seen by Hematology and Cardiology in preparation for OR Skin Graft procedure with Dr. Perez on Monday 9/23.    - OR planning 9/23 for Skin Graft with Dr. Perez  - [facility] this weekend  - Optimization achieved via Cardiology and Hematology  - continue home medications including anticoagulation  - Diet: soft, regular  - Appreciate OBGYN consult: no interventions for labial lesion: warm compress  - Appreciate ID Consult: repeat blood cultures were negative; Meropenem 1g q 8  - Appreciate Heme/Onc Consult: Iron studies are c/w anemia of chronic inflammation. Given Procrit 40,000U x1 and use pediatric vials to obtain lab work. Will include follow-up with Hematology after discharge for further management.    [facility], [id number] 75 yo Female with PMH of asthma, A fib, dm, HFrEF, pacemaker/AICD, perforated diverticulitis s/p L hemicolectomy and end colostomy w/ multiple washouts and abdominal wall closure w/ strattice (last surgery on 7/11), presents from [place] due to possible dislodgement of IR drain and increased purulent output from her abdomen. CT scan showing increase in size of L paracolic gutter fluid collection, however drains appear in place. POD 8 s/p IR upsizing, though continued low drain output. Wound vac placed over abdominal wound (9/16/2019). CT A/P obtained 9/20/2019 preliminarily showing enteric contrast within the left peristomal subcutaneous fat and apparent communication to collection related to an episode of complicated diverticulitis. She has been seen by Hematology and Cardiology in preparation for OR Skin Graft procedure with Dr. Perez on Monday 9/23.    - OR planning 9/23 for Skin Graft with Dr. Perez  - [facility] this weekend  - Optimization achieved via Cardiology and Hematology  - continue home medications  - Diet: soft, regular  - Appreciate OBGYN consult: no interventions for labial lesion: warm compress  - Appreciate ID Consult: repeat blood cultures were negative; Meropenem 1g q 8  - Appreciate Heme/Onc Consult: Iron studies are c/w anemia of chronic inflammation. Given Procrit 40,000U x1 and use pediatric vials to obtain lab work. Will include follow-up with Hematology after discharge for further management.    [facility], [id number]

## 2019-09-21 NOTE — PROGRESS NOTE ADULT - SUBJECTIVE AND OBJECTIVE BOX
Green [facility] Surgery Progress Note     SUBJECTIVE / 24H EVENTS  Patient seen and examined on morning rounds. No acute events overnight. She currently has no complaints. She would like to ensure that any interventions taken operatively take into consideration that she does not take blood products. She denies fevers, chills, nausea, or vomiting.     OBJECTIVE:    VITAL SIGNS:  T(C): 36.4 (09-20-19 @ 21:48), Max: 37.3 (09-20-19 @ 16:54)  HR: 90 (09-20-19 @ 21:48) (82 - 106)  BP: 111/74 (09-20-19 @ 21:48) (104/67 - 127/74)  RR: 18 (09-20-19 @ 21:48) (18 - 18)  SpO2: 96% (09-20-19 @ 21:48) (95% - 98%)  Daily     Daily   POCT Blood Glucose.: 181 mg/dL (09-20-19 @ 21:43)  POCT Blood Glucose.: 133 mg/dL (09-20-19 @ 16:58)  POCT Blood Glucose.: 159 mg/dL (09-20-19 @ 12:38)      PHYSICAL EXAM:  General: well developed, NAD  Neuro: alert and oriented, no focal deficits, moves all extremities spontaneously  Respiratory: airway patent, respirations unlabored  CVS: regular rate and rhythm  Abdomen: soft, midline abdominal wound dressed with vac c/d/i, infraumbilical pouch draining pus, ostomy pink/viable, Abdominal wound healing well with granulation tissue covering  Extremities: no edema, sensation and movement grossly intact      09-19-19 @ 07:01  -  09-20-19 @ 07:00  --------------------------------------------------------  IN:    IV PiggyBack: 50 mL    Oral Fluid: 380 mL  Total IN: 430 mL    OUT:    Colostomy: 325 mL    Voided: 575 mL  Total OUT: 900 mL    Total NET: -470 mL      09-20-19 @ 07:01  -  09-21-19 @ 03:21  --------------------------------------------------------  IN:    IV PiggyBack: 50 mL    Oral Fluid: 300 mL  Total IN: 350 mL    OUT:    Voided: 400 mL  Total OUT: 400 mL    Total NET: -50 mL          LAB VALUES:  09-20    136  |  99  |  5<L>  ----------------------------<  119<H>  3.9   |  24  |  0.45<L>    Ca    8.1<L>      20 Sep 2019 07:19  Phos  2.4     09-20  Mg     1.5     09-20                                 8.0    6.49  )-----------( 235      ( 20 Sep 2019 09:58 )             25.7           MICROBIOLOGY:    Culture - Blood (collected 16 Sep 2019 12:08)  Source: .Blood  Preliminary Report (17 Sep 2019 13:01):    No growth to date.    Culture - Blood (collected 15 Sep 2019 23:34)  Source: .Blood  Final Report (21 Sep 2019 01:01):    No growth at 5 days.      RADIOLOGY:  PACS Image: Image(s) Available (09-20-19 @ 19:21)      MEDICATIONS  (STANDING):  atorvastatin 20 milliGRAM(s) Oral at bedtime  chlorhexidine 2% Cloths 1 Application(s) Topical daily  dextrose 5%. 1000 milliLiter(s) (50 mL/Hr) IV Continuous <Continuous>  dextrose 50% Injectable 12.5 Gram(s) IV Push once  dextrose 50% Injectable 25 Gram(s) IV Push once  dextrose 50% Injectable 25 Gram(s) IV Push once  enalapril 5 milliGRAM(s) Oral daily  famotidine    Tablet 20 milliGRAM(s) Oral daily  furosemide    Tablet 40 milliGRAM(s) Oral daily  insulin lispro (HumaLOG) corrective regimen sliding scale   SubCutaneous three times a day before meals  insulin lispro (HumaLOG) corrective regimen sliding scale   SubCutaneous at bedtime  magnesium oxide 400 milliGRAM(s) Oral three times a day with meals  meropenem  IVPB 1000 milliGRAM(s) IV Intermittent every 8 hours  montelukast 10 milliGRAM(s) Oral daily  nystatin Powder 1 Application(s) Topical two times a day  nystatin Powder 1 Application(s) Topical once  spironolactone 25 milliGRAM(s) Oral daily  tiotropium 18 MICROgram(s) Capsule 1 Capsule(s) Inhalation daily    MEDICATIONS  (PRN):  ALBUTerol    0.083% 2.5 milliGRAM(s) Nebulizer every 6 hours PRN Shortness of Breath and/or Wheezing  dextrose 40% Gel 15 Gram(s) Oral once PRN Blood Glucose LESS THAN 70 milliGRAM(s)/deciliter  glucagon  Injectable 1 milliGRAM(s) IntraMuscular once PRN Glucose LESS THAN 70 milligrams/deciliter

## 2019-09-22 ENCOUNTER — TRANSCRIPTION ENCOUNTER (OUTPATIENT)
Age: 74
End: 2019-09-22

## 2019-09-22 LAB
APPEARANCE UR: CLEAR — SIGNIFICANT CHANGE UP
BACTERIA # UR AUTO: NEGATIVE — SIGNIFICANT CHANGE UP
BILIRUB UR-MCNC: NEGATIVE — SIGNIFICANT CHANGE UP
COD CRY URNS QL: ABNORMAL
COLOR SPEC: YELLOW — SIGNIFICANT CHANGE UP
COMMENT - URINE: SIGNIFICANT CHANGE UP
DIFF PNL FLD: ABNORMAL
EPI CELLS # UR: 1 /HPF — SIGNIFICANT CHANGE UP
GLUCOSE UR QL: NEGATIVE — SIGNIFICANT CHANGE UP
HYALINE CASTS # UR AUTO: 1 /LPF — SIGNIFICANT CHANGE UP (ref 0–2)
KETONES UR-MCNC: SIGNIFICANT CHANGE UP
LEUKOCYTE ESTERASE UR-ACNC: ABNORMAL
NITRITE UR-MCNC: NEGATIVE — SIGNIFICANT CHANGE UP
PH UR: 7.5 — SIGNIFICANT CHANGE UP (ref 5–8)
PROT UR-MCNC: ABNORMAL
RBC CASTS # UR COMP ASSIST: 2 /HPF — SIGNIFICANT CHANGE UP (ref 0–4)
SP GR SPEC: 1.03 — HIGH (ref 1.01–1.02)
UROBILINOGEN FLD QL: ABNORMAL
WBC UR QL: 15 /HPF — HIGH (ref 0–5)

## 2019-09-22 RX ADMIN — MAGNESIUM OXIDE 400 MG ORAL TABLET 400 MILLIGRAM(S): 241.3 TABLET ORAL at 12:25

## 2019-09-22 RX ADMIN — TIOTROPIUM BROMIDE 1 CAPSULE(S): 18 CAPSULE ORAL; RESPIRATORY (INHALATION) at 12:25

## 2019-09-22 RX ADMIN — MONTELUKAST 10 MILLIGRAM(S): 4 TABLET, CHEWABLE ORAL at 12:25

## 2019-09-22 RX ADMIN — MEROPENEM 100 MILLIGRAM(S): 1 INJECTION INTRAVENOUS at 06:06

## 2019-09-22 RX ADMIN — SPIRONOLACTONE 25 MILLIGRAM(S): 25 TABLET, FILM COATED ORAL at 06:06

## 2019-09-22 RX ADMIN — CHLORHEXIDINE GLUCONATE 1 APPLICATION(S): 213 SOLUTION TOPICAL at 12:24

## 2019-09-22 RX ADMIN — ATORVASTATIN CALCIUM 20 MILLIGRAM(S): 80 TABLET, FILM COATED ORAL at 22:40

## 2019-09-22 RX ADMIN — MEROPENEM 100 MILLIGRAM(S): 1 INJECTION INTRAVENOUS at 22:39

## 2019-09-22 RX ADMIN — NYSTATIN CREAM 1 APPLICATION(S): 100000 CREAM TOPICAL at 06:07

## 2019-09-22 RX ADMIN — NYSTATIN CREAM 1 APPLICATION(S): 100000 CREAM TOPICAL at 20:23

## 2019-09-22 RX ADMIN — MEROPENEM 100 MILLIGRAM(S): 1 INJECTION INTRAVENOUS at 13:39

## 2019-09-22 RX ADMIN — NYSTATIN CREAM 1 APPLICATION(S): 100000 CREAM TOPICAL at 12:26

## 2019-09-22 RX ADMIN — MAGNESIUM OXIDE 400 MG ORAL TABLET 400 MILLIGRAM(S): 241.3 TABLET ORAL at 20:23

## 2019-09-22 RX ADMIN — Medication 40 MILLIGRAM(S): at 06:06

## 2019-09-22 RX ADMIN — MAGNESIUM OXIDE 400 MG ORAL TABLET 400 MILLIGRAM(S): 241.3 TABLET ORAL at 09:24

## 2019-09-22 RX ADMIN — Medication 5 MILLIGRAM(S): at 06:06

## 2019-09-22 RX ADMIN — FAMOTIDINE 20 MILLIGRAM(S): 10 INJECTION INTRAVENOUS at 12:25

## 2019-09-22 NOTE — PROGRESS NOTE ADULT - ASSESSMENT
73 yo Female with PMH of asthma, A fib, dm, HFrEF, pacemaker/AICD, perforated diverticulitis s/p L hemicolectomy and end colostomy w/ multiple washouts and abdominal wall closure w/ strattice (last surgery on 7/11), presents from [place] due to possible dislodgement of IR drain and increased purulent output from her abdomen. CT scan showing increase in size of L paracolic gutter fluid collection, however drains appear in place. POD 7 s/p IR upsizing, though continued low drain output. Wound vac placed over abdominal wound (9/16/2019).    - possible OR Monday with plastic surgery, will follow up  - f/u echo and cardiology for cardiac clearance  - repeat Blood Cultures  - continue home medications including anticoagulation  - Diet: soft, regular  - Appreciate ID Consult: will Continue Ertapenem 1g IV Q24H; will continue Vancomycin 750mg IV Q12H. Trough prior to fourth dose  - Appreciate Heme/Onc Consult: Iron studies are c/w anemia of chronic inflammation. Given Procrit 40,000U x1 and use pediatric vials to obtain lab work. Will include follow-up with Hematology after discharge for further management.    [facility], [id number]

## 2019-09-22 NOTE — PROGRESS NOTE ADULT - SUBJECTIVE AND OBJECTIVE BOX
SUBJECTIVE:  Reports no pain  Denies nausea, vomiting, diarrhea.   Is passing gas and having bowel movements  Tolerating diet  Ambulating    OBJECTIVE:  Vital Signs Last 24 Hrs  T(C): 37.1 (22 Sep 2019 00:10), Max: 37.1 (21 Sep 2019 09:16)  T(F): 98.7 (22 Sep 2019 00:10), Max: 98.7 (21 Sep 2019 09:16)  HR: 98 (22 Sep 2019 00:10) (66 - 98)  BP: 106/69 (22 Sep 2019 00:10) (98/64 - 112/67)  BP(mean): --  RR: 18 (22 Sep 2019 00:10) (17 - 19)  SpO2: 95% (22 Sep 2019 00:10) (95% - 97%)    I&O's Detail    20 Sep 2019 07:01  -  21 Sep 2019 07:00  --------------------------------------------------------  IN:    IV PiggyBack: 50 mL    Oral Fluid: 300 mL  Total IN: 350 mL    OUT:    Drain: 50 mL    Voided: 600 mL  Total OUT: 650 mL    Total NET: -300 mL      21 Sep 2019 07:01  -  22 Sep 2019 04:53  --------------------------------------------------------  IN:    Oral Fluid: 540 mL    Other: 50 mL  Total IN: 590 mL    OUT:    Colostomy: 350 mL    Voided: 600 mL  Total OUT: 950 mL    Total NET: -360 mL          Inpatient Medications:  MEDICATIONS  (STANDING):  atorvastatin 20 milliGRAM(s) Oral at bedtime  chlorhexidine 2% Cloths 1 Application(s) Topical daily  dextrose 5%. 1000 milliLiter(s) (50 mL/Hr) IV Continuous <Continuous>  dextrose 50% Injectable 12.5 Gram(s) IV Push once  dextrose 50% Injectable 25 Gram(s) IV Push once  dextrose 50% Injectable 25 Gram(s) IV Push once  enalapril 5 milliGRAM(s) Oral daily  famotidine    Tablet 20 milliGRAM(s) Oral daily  furosemide    Tablet 40 milliGRAM(s) Oral daily  insulin lispro (HumaLOG) corrective regimen sliding scale   SubCutaneous three times a day before meals  insulin lispro (HumaLOG) corrective regimen sliding scale   SubCutaneous at bedtime  magnesium oxide 400 milliGRAM(s) Oral three times a day with meals  meropenem  IVPB 1000 milliGRAM(s) IV Intermittent every 8 hours  montelukast 10 milliGRAM(s) Oral daily  nystatin Powder 1 Application(s) Topical two times a day  nystatin Powder 1 Application(s) Topical once  spironolactone 25 milliGRAM(s) Oral daily  tiotropium 18 MICROgram(s) Capsule 1 Capsule(s) Inhalation daily    MEDICATIONS  (PRN):  ALBUTerol    0.083% 2.5 milliGRAM(s) Nebulizer every 6 hours PRN Shortness of Breath and/or Wheezing  dextrose 40% Gel 15 Gram(s) Oral once PRN Blood Glucose LESS THAN 70 milliGRAM(s)/deciliter  glucagon  Injectable 1 milliGRAM(s) IntraMuscular once PRN Glucose LESS THAN 70 milligrams/deciliter      PHYSICAL EXAM:  General: well developed, NAD  Neuro: alert and oriented, no focal deficits, moves all extremities spontaneously  Respiratory: airway patent, respirations unlabored  CVS: regular rate and rhythm  Abdomen: soft, midline abdominal wound dressed with vac c/d/i, infraumbilical pouch draining pus, ostomy pink/viable, Abdominal wound healing well with granulation tissue covering  Extremities: no edema, sensation and movement grossly intact    LABS:                        8.0    6.49  )-----------( 235      ( 20 Sep 2019 09:58 )             25.7       09-20    136  |  99  |  5<L>  ----------------------------<  119<H>  3.9   |  24  |  0.45<L>    Ca    8.1<L>      20 Sep 2019 07:19  Phos  2.4     09-20  Mg     1.5     09-20        CULTURES:  .Blood  09-16 @ 12:08   No growth at 5 days.  --  --      .Blood  09-15 @ 23:34   No growth at 5 days.  --  --      .Abscess  09-13 @ 22:59   Numerous Escherichia coli ESBL  Numerous Enterococcus faecalis  --  Escherichia coli ESBL  Enterococcus faecalis      .Blood  09-12 @ 22:49   Growth in aerobic bottle: Staphylococcus epidermidis  --  Coag Negative Staphylococcus      BLOOD PERIPHERAL  09-03 @ 07:17 --    NO ORGANISMS ISOLATED  --      .Blood  08-30 @ 18:43   No growth at 5 days.  --  --      .Surgical Swab  08-30 @ 18:42   Few Escherichia coli  Rare Enterococcus faecalis  Rare Candida albicans "Susceptibilities not performed"  --  Escherichia coli  Enterococcus faecalis      .Blood  08-30 @ 18:00   No growth at 5 days.  --  --      .Body Fluid right abdomen wall  08-16 @ 15:21   No growth at 5 days  --    Few polymorphonuclear leukocytes per low power field  No organisms seen per oil power field      .Body Fluid left retroperitoneal  08-16 @ 15:18   Numerous Escherichia coli  Numerous Alpha hemolytic strep "Susceptibilities not performed"  Moderate Bacteroides thetaiotaomicron "Susceptibilities not performed"  Few Enterococcus faecalis  --  Escherichia coli  Enterococcus faecalis      .Blood  08-16 @ 13:08   No growth at 5 days.  --  --      .Blood  08-14 @ 17:44   Growth in aerobic bottle: Coag Negative Staphylococcus  Single set isolate, possible contaminant. Contact  Microbiology if susceptibility testing clinically  indicated.  "Due to technical problems, Proteus sp. will Not be reported as part of  the BCID panel until further notice"  ***Blood Panel PCR results on this specimen are available  approximately 3 hours after the Gram stain result.***  Gram stain, PCR, and/or culture results may not always  correspond due to difference in methodologies.  ************************************************************  This PCR assay was performed using [employer].  The following targets are tested for: Enterococcus,  vancomycin resistant enterococci, Listeria monocytogenes,  coagulase negative staphylococci, S. aureus,  methicillin resistant S. aureus, Streptococcus agalactiae  (Group B), S. pneumoniae, S. pyogenes (Group A),  Acinetobacter baumannii, Enterobacter cloacae, E. coli,  Klebsiella oxytoca, K. pneumoniae, Proteus sp.,  Serratia marcescens, Haemophilus influenzae,  Neisseria meningitidis, Pseudomonas aeruginosa, Candida  albicans, C. glabrata, C krusei, C parapsilosis,  C. tropicalis and the KPC resistance gene.  --  Blood Culture PCR      .Sputum  07-29 @ 04:17   Moderate Pseudomonas aeruginosa (Carbapenem Resistant) #2  Normal Respiratory Liz present  --  Pseudomonas aeruginosa (Carbapenem Resistant)      .Urine  07-24 @ 01:06   No growth  --  --      .Body Fluid  07-23 @ 13:26   Numerous Escherichia coli  Few Coag Negative Staphylococcus  Numerous Escherichia coli #2  --  Escherichia coli  Escherichia coli      .Blood  07-10 @ 20:09   No growth at 5 days.  --  --      .Blood  07-10 @ 20:08   No growth at 5 days.  --  --      .Body Fluid abdominal fluid  07-09 @ 17:54   Few Enterococcus faecalis  Few Coag Negative Staphylococcus  --  Enterococcus faecalis  Coag Negative Staphylococcus      .Body Fluid abdominal fluid  07-09 @ 17:46   Few Enterococcus faecalis  Few Coag Negative Staphylococcus  --  Enterococcus faecalis  Coag Negative Staphylococcus      .Surgical Swab None Specimen Source:  abdomen, esw  07-02 @ 10:15   Moderate Escherichia coli  Moderate Alpha hemolytic strep "Susceptibilities not performed"  Moderate Strep mitis oralis group "Susceptibilities not performed"  Growth in fluid media only Enterococcus faecalis  --  Escherichia coli  Enterococcus faecalis      .Blood  07-01 @ 10:04   No growth at 5 days.  --  --      .Urine  07-01 @ 10:03   No growth  --  --          IMAGING:  [EXAM: CT ABDOMEN AND PELVIS IC       PROCEDURE DATE: 09/20/2019           INTERPRETATION: CLINICAL INFORMATION: Rectal stump leak     COMPARISON: 9/12/2019     PROCEDURE:   CT of the Abdomen and Pelvis was performed with intravenous contrast.   Intravenous contrast: 90 ml Omnipaque 350. 10 ml discarded.   Oral contrast: None.   Sagittal and coronal reformats were performed.     FINDINGS:       LOWER CHEST: Small left pleural effusion and near complete left lower lobe   atelectasis. Right middle subsegmental lobe atelectasis. Cardiac device   leads.     LIVER: Within normal limits.   BILE DUCTS: Normal caliber.   GALLBLADDER: Cholecystectomy.   SPLEEN: Within normal limits.   PANCREAS: Atrophic   ADRENALS: Within normal limits.   KIDNEYS/URETERS: Left renal cyst and nonobstructing calculus. Scarring at   the left lower pole.     BLADDER: Within normal limits.   REPRODUCTIVE ORGANS: Fibroid uterus is otherwise within normal limits.     BOWEL: Long Delgado's pouch with right lower quadrant colostomy. There is   large amount of contrast sterilization from the rectal stump which tracks   into the abdominal wall and to the skin. Additionally contrast extravasates   into the left flank fluid collection with a drainage catheter in place.   PERITONEUM: No ascites.   VESSELS: Within normal limits.   RETROPERITONEUM/LYMPH NODES: No lymphadenopathy.   ABDOMINAL WALL: Laxity of the ventral abdominal wall with protrusion of   bowel loops up to the skin.   BONES: Degenerative change.     IMPRESSION: Large rectal stump leak tracking into the abdominal wall and to   the skin. Additional contrast extravasation into the left flank fluid   collection.       ]

## 2019-09-22 NOTE — CHART NOTE - NSCHARTNOTEFT_GEN_A_CORE
Surgery Pre-op Note  75 yo Female with PMH of asthma, A fib, dm, HFrEF, pacemaker/AICD, perforated diverticulitis s/p L hemicolectomy and end colostomy w/ multiple washouts and abdominal wall closure w/ strattice (last surgery on 7/11), presents from [facility] due to possible dislodgement of IR drain and increased purulent output from her abdomen. CT scan showing increase in size of L paracolic gutter fluid collection, however drains appear in place. s/p IR upsizing, though continued low drain output. Wound vac placed over abdominal wound (9/16/2019).      Procedure:   Surgeon: Dr. Perez    Vitals/Labs:  Vital Signs Last 24 Hrs  T(C): 36.9 (22 Sep 2019 09:45), Max: 37.2 (22 Sep 2019 05:58)  T(F): 98.4 (22 Sep 2019 09:45), Max: 99 (22 Sep 2019 05:58)  HR: 109 (22 Sep 2019 09:45) (72 - 109)  BP: 105/69 (22 Sep 2019 09:45) (98/64 - 112/67)  BP(mean): --  RR: 18 (22 Sep 2019 09:45) (17 - 19)  SpO2: 94% (22 Sep 2019 09:45) (93% - 97%)    I&O's Detail    21 Sep 2019 07:01  -  22 Sep 2019 07:00  --------------------------------------------------------  IN:    Oral Fluid: 540 mL    Other: 50 mL  Total IN: 590 mL    OUT:    Colostomy: 350 mL    Voided: 600 mL  Total OUT: 950 mL    Total NET: -360 mL      22 Sep 2019 07:01  -  22 Sep 2019 10:47  --------------------------------------------------------  IN:    Oral Fluid: 120 mL  Total IN: 120 mL    OUT:    Colostomy: 75 mL  Total OUT: 75 mL    Total NET: 45 mL                      CAPILLARY BLOOD GLUCOSE      POCT Blood Glucose.: 78 mg/dL (22 Sep 2019 09:08)  POCT Blood Glucose.: 134 mg/dL (21 Sep 2019 21:40)  POCT Blood Glucose.: 112 mg/dL (21 Sep 2019 17:50)  POCT Blood Glucose.: 180 mg/dL (21 Sep 2019 14:23)                    Imaging:   Chest X RAY: FINDINGS:     Cardiac silhouette normal in size. Lungs are clear. No pleural effusion or   pneumothorax. Left-sided defibrillator. Right-sided PICC with tip overlying   right atrium.     IMPRESSION:   Clear lungs.       Echo:  CONCLUSIONS:  Technically difficult study with limited diagnostic  information.  1. No mitral valve regurgitation seen.  2. Normal left ventricular internal dimensions and wall  thicknesses.  3. Endocardium not well visualized; Precludes evaluation of  regional wall motion and quantitative LVEF.  Can consider  repeating with Definity or alternate imaging modality to  evaluate further if clinically indicated.  The left  ventricular function appears abnormal.  4. Normal right ventricular size and function.  There is a  wire/device seen in the right heart.  5. The tricuspid valve is not well seen.  6. The atrial sizes are not well assessed.  7. There is no pericardial effusion seen.    EKG:   Ventricular Rate 101 BPM    Atrial Rate 101 BPM    P-R Interval 184 ms    QRS Duration 120 ms    Q-T Interval 356 ms    QTC Calculation(Bezet) 461 ms    R Axis -33 degrees    T Axis 200 degrees    Diagnosis Line SINUS TACHYCARDIA  Atrial-sensed ventricular-paced rhythm      Assessment & Plan:  ANDRE BARKER 74y Female  scheduled for skin graft by plastic surgery tomorrow for abdominal wound.  - NPO after midnight  - IVF while NPO  - Eliquis held for 2 days prior to operation  - CBC, BMP w/ mag and phos, PTT and PT/INR, type and screen     MEDICATIONS  (STANDING):  atorvastatin 20 milliGRAM(s) Oral at bedtime  chlorhexidine 2% Cloths 1 Application(s) Topical daily  dextrose 5%. 1000 milliLiter(s) (50 mL/Hr) IV Continuous <Continuous>  dextrose 50% Injectable 12.5 Gram(s) IV Push once  dextrose 50% Injectable 25 Gram(s) IV Push once  dextrose 50% Injectable 25 Gram(s) IV Push once  enalapril 5 milliGRAM(s) Oral daily  famotidine    Tablet 20 milliGRAM(s) Oral daily  furosemide    Tablet 40 milliGRAM(s) Oral daily  insulin lispro (HumaLOG) corrective regimen sliding scale   SubCutaneous three times a day before meals  insulin lispro (HumaLOG) corrective regimen sliding scale   SubCutaneous at bedtime  magnesium oxide 400 milliGRAM(s) Oral three times a day with meals  meropenem  IVPB 1000 milliGRAM(s) IV Intermittent every 8 hours  montelukast 10 milliGRAM(s) Oral daily  nystatin Powder 1 Application(s) Topical two times a day  nystatin Powder 1 Application(s) Topical once  spironolactone 25 milliGRAM(s) Oral daily  tiotropium 18 MICROgram(s) Capsule 1 Capsule(s) Inhalation daily    MEDICATIONS  (PRN):  ALBUTerol    0.083% 2.5 milliGRAM(s) Nebulizer every 6 hours PRN Shortness of Breath and/or Wheezing  dextrose 40% Gel 15 Gram(s) Oral once PRN Blood Glucose LESS THAN 70 milliGRAM(s)/deciliter  glucagon  Injectable 1 milliGRAM(s) IntraMuscular once PRN Glucose LESS THAN 70 milligrams/deciliter Surgery Pre-op Note  75 yo Female with PMH of asthma, A fib, dm, HFrEF, pacemaker/AICD, perforated diverticulitis s/p L hemicolectomy and end colostomy w/ multiple washouts and abdominal wall closure w/ strattice (last surgery on 7/11), presents from [facility] due to possible dislodgement of IR drain and increased purulent output from her abdomen. CT scan showing increase in size of L paracolic gutter fluid collection, however drains appear in place. s/p IR upsizing, though continued low drain output. Wound vac placed over abdominal wound (9/16/2019).      Procedure:   Surgeon: Dr. Perez    Vitals/Labs:  Vital Signs Last 24 Hrs  T(C): 36.9 (22 Sep 2019 09:45), Max: 37.2 (22 Sep 2019 05:58)  T(F): 98.4 (22 Sep 2019 09:45), Max: 99 (22 Sep 2019 05:58)  HR: 109 (22 Sep 2019 09:45) (72 - 109)  BP: 105/69 (22 Sep 2019 09:45) (98/64 - 112/67)  BP(mean): --  RR: 18 (22 Sep 2019 09:45) (17 - 19)  SpO2: 94% (22 Sep 2019 09:45) (93% - 97%)    I&O's Detail    21 Sep 2019 07:01  -  22 Sep 2019 07:00  --------------------------------------------------------  IN:    Oral Fluid: 540 mL    Other: 50 mL  Total IN: 590 mL    OUT:    Colostomy: 350 mL    Voided: 600 mL  Total OUT: 950 mL    Total NET: -360 mL      22 Sep 2019 07:01  -  22 Sep 2019 10:47  --------------------------------------------------------  IN:    Oral Fluid: 120 mL  Total IN: 120 mL    OUT:    Colostomy: 75 mL  Total OUT: 75 mL    Total NET: 45 mL                      CAPILLARY BLOOD GLUCOSE      POCT Blood Glucose.: 78 mg/dL (22 Sep 2019 09:08)  POCT Blood Glucose.: 134 mg/dL (21 Sep 2019 21:40)  POCT Blood Glucose.: 112 mg/dL (21 Sep 2019 17:50)  POCT Blood Glucose.: 180 mg/dL (21 Sep 2019 14:23)          MEDICATIONS  (PRN):  ALBUTerol    0.083% 2.5 milliGRAM(s) Nebulizer every 6 hours PRN Shortness of Breath and/or Wheezing  dextrose 40% Gel 15 Gram(s) Oral once PRN Blood Glucose LESS THAN 70 milliGRAM(s)/deciliter  glucagon  Injectable 1 milliGRAM(s) IntraMuscular once PRN Glucose LESS THAN 70 milligrams/deciliter     MEDICATIONS  (STANDING):  atorvastatin 20 milliGRAM(s) Oral at bedtime  chlorhexidine 2% Cloths 1 Application(s) Topical daily  dextrose 5%. 1000 milliLiter(s) (50 mL/Hr) IV Continuous <Continuous>  dextrose 50% Injectable 12.5 Gram(s) IV Push once  dextrose 50% Injectable 25 Gram(s) IV Push once  dextrose 50% Injectable 25 Gram(s) IV Push once  enalapril 5 milliGRAM(s) Oral daily  famotidine    Tablet 20 milliGRAM(s) Oral daily  furosemide    Tablet 40 milliGRAM(s) Oral daily  insulin lispro (HumaLOG) corrective regimen sliding scale   SubCutaneous three times a day before meals  insulin lispro (HumaLOG) corrective regimen sliding scale   SubCutaneous at bedtime  magnesium oxide 400 milliGRAM(s) Oral three times a day with meals  meropenem  IVPB 1000 milliGRAM(s) IV Intermittent every 8 hours  montelukast 10 milliGRAM(s) Oral daily  nystatin Powder 1 Application(s) Topical two times a day  nystatin Powder 1 Application(s) Topical once  spironolactone 25 milliGRAM(s) Oral daily  tiotropium 18 MICROgram(s) Capsule 1 Capsule(s) Inhalation daily            Imaging:   Chest X RAY: FINDINGS:     Cardiac silhouette normal in size. Lungs are clear. No pleural effusion or   pneumothorax. Left-sided defibrillator. Right-sided PICC with tip overlying   right atrium.     IMPRESSION:   Clear lungs.       Echo:  CONCLUSIONS:  Technically difficult study with limited diagnostic  information.  1. No mitral valve regurgitation seen.  2. Normal left ventricular internal dimensions and wall  thicknesses.  3. Endocardium not well visualized; Precludes evaluation of  regional wall motion and quantitative LVEF.  Can consider  repeating with Definity or alternate imaging modality to  evaluate further if clinically indicated.  The left  ventricular function appears abnormal.  4. Normal right ventricular size and function.  There is a  wire/device seen in the right heart.  5. The tricuspid valve is not well seen.  6. The atrial sizes are not well assessed.  7. There is no pericardial effusion seen.    EKG:   Ventricular Rate 101 BPM    Atrial Rate 101 BPM    P-R Interval 184 ms    QRS Duration 120 ms    Q-T Interval 356 ms    QTC Calculation(Bezet) 461 ms    R Axis -33 degrees    T Axis 200 degrees    Diagnosis Line SINUS TACHYCARDIA  Atrial-sensed ventricular-paced rhythm      Assessment & Plan:  ANDRE BARKER 74y Female  scheduled for skin graft by plastic surgery tomorrow for abdominal wound.  - NPO after midnight  - IVF while NPO  - Eliquis held for 2 days prior to operation  - CBC, BMP w/ mag and phos, PTT and PT/INR, type and screen  - PATIENT REITERATED TO ME PERSONALLY HER DESIRE NOT TO HAVE A BLOOD TRANSFUSION, NO MATTER WHAT HAPPENS DURING THE OPERATION. SHE IS A [employer] WITNESS AND WILL NOT ACCEPT BLOOD. Please consent appropriately

## 2019-09-23 LAB
ANION GAP SERPL CALC-SCNC: 10 MMOL/L — SIGNIFICANT CHANGE UP (ref 5–17)
APTT BLD: 31.3 SEC — SIGNIFICANT CHANGE UP (ref 27.5–36.3)
BUN SERPL-MCNC: 8 MG/DL — SIGNIFICANT CHANGE UP (ref 7–23)
CALCIUM SERPL-MCNC: 8.5 MG/DL — SIGNIFICANT CHANGE UP (ref 8.4–10.5)
CHLORIDE SERPL-SCNC: 100 MMOL/L — SIGNIFICANT CHANGE UP (ref 96–108)
CO2 SERPL-SCNC: 28 MMOL/L — SIGNIFICANT CHANGE UP (ref 22–31)
CREAT SERPL-MCNC: 0.46 MG/DL — LOW (ref 0.5–1.3)
GLUCOSE SERPL-MCNC: 91 MG/DL — SIGNIFICANT CHANGE UP (ref 70–99)
HCT VFR BLD CALC: 25.7 % — LOW (ref 34.5–45)
HGB BLD-MCNC: 7.9 G/DL — LOW (ref 11.5–15.5)
INR BLD: 1.27 RATIO — HIGH (ref 0.88–1.16)
MAGNESIUM SERPL-MCNC: 1.6 MG/DL — SIGNIFICANT CHANGE UP (ref 1.6–2.6)
MCHC RBC-ENTMCNC: 30.3 PG — SIGNIFICANT CHANGE UP (ref 27–34)
MCHC RBC-ENTMCNC: 30.7 GM/DL — LOW (ref 32–36)
MCV RBC AUTO: 98.5 FL — SIGNIFICANT CHANGE UP (ref 80–100)
PHOSPHATE SERPL-MCNC: 2 MG/DL — LOW (ref 2.5–4.5)
PLATELET # BLD AUTO: 221 K/UL — SIGNIFICANT CHANGE UP (ref 150–400)
POTASSIUM SERPL-MCNC: 3.5 MMOL/L — SIGNIFICANT CHANGE UP (ref 3.5–5.3)
POTASSIUM SERPL-SCNC: 3.5 MMOL/L — SIGNIFICANT CHANGE UP (ref 3.5–5.3)
PROTHROM AB SERPL-ACNC: 14.4 SEC — HIGH (ref 10–13.1)
RBC # BLD: 2.61 M/UL — LOW (ref 3.8–5.2)
RBC # FLD: 21.6 % — HIGH (ref 10.3–14.5)
SODIUM SERPL-SCNC: 138 MMOL/L — SIGNIFICANT CHANGE UP (ref 135–145)
WBC # BLD: 7.8 K/UL — SIGNIFICANT CHANGE UP (ref 3.8–10.5)
WBC # FLD AUTO: 7.8 K/UL — SIGNIFICANT CHANGE UP (ref 3.8–10.5)

## 2019-09-23 PROCEDURE — 99231 SBSQ HOSP IP/OBS SF/LOW 25: CPT

## 2019-09-23 PROCEDURE — 99232 SBSQ HOSP IP/OBS MODERATE 35: CPT

## 2019-09-23 RX ORDER — HYDROMORPHONE HYDROCHLORIDE 2 MG/ML
0.25 INJECTION INTRAMUSCULAR; INTRAVENOUS; SUBCUTANEOUS
Refills: 0 | Status: DISCONTINUED | OUTPATIENT
Start: 2019-09-23 | End: 2019-09-23

## 2019-09-23 RX ORDER — INSULIN LISPRO 100/ML
VIAL (ML) SUBCUTANEOUS
Refills: 0 | Status: DISCONTINUED | OUTPATIENT
Start: 2019-09-23 | End: 2019-10-04

## 2019-09-23 RX ORDER — SODIUM CHLORIDE 9 MG/ML
1000 INJECTION, SOLUTION INTRAVENOUS
Refills: 0 | Status: DISCONTINUED | OUTPATIENT
Start: 2019-09-23 | End: 2019-09-24

## 2019-09-23 RX ORDER — INSULIN LISPRO 100/ML
VIAL (ML) SUBCUTANEOUS EVERY 6 HOURS
Refills: 0 | Status: DISCONTINUED | OUTPATIENT
Start: 2019-09-23 | End: 2019-09-23

## 2019-09-23 RX ORDER — ACETAMINOPHEN 500 MG
1000 TABLET ORAL ONCE
Refills: 0 | Status: COMPLETED | OUTPATIENT
Start: 2019-09-23 | End: 2019-09-25

## 2019-09-23 RX ORDER — HYDROMORPHONE HYDROCHLORIDE 2 MG/ML
0.5 INJECTION INTRAMUSCULAR; INTRAVENOUS; SUBCUTANEOUS
Refills: 0 | Status: DISCONTINUED | OUTPATIENT
Start: 2019-09-23 | End: 2019-09-23

## 2019-09-23 RX ORDER — POTASSIUM PHOSPHATE, MONOBASIC POTASSIUM PHOSPHATE, DIBASIC 236; 224 MG/ML; MG/ML
15 INJECTION, SOLUTION INTRAVENOUS ONCE
Refills: 0 | Status: COMPLETED | OUTPATIENT
Start: 2019-09-23 | End: 2019-09-23

## 2019-09-23 RX ADMIN — MEROPENEM 100 MILLIGRAM(S): 1 INJECTION INTRAVENOUS at 06:36

## 2019-09-23 RX ADMIN — MAGNESIUM OXIDE 400 MG ORAL TABLET 400 MILLIGRAM(S): 241.3 TABLET ORAL at 10:41

## 2019-09-23 RX ADMIN — TIOTROPIUM BROMIDE 1 CAPSULE(S): 18 CAPSULE ORAL; RESPIRATORY (INHALATION) at 11:42

## 2019-09-23 RX ADMIN — CHLORHEXIDINE GLUCONATE 1 APPLICATION(S): 213 SOLUTION TOPICAL at 11:38

## 2019-09-23 RX ADMIN — POTASSIUM PHOSPHATE, MONOBASIC POTASSIUM PHOSPHATE, DIBASIC 62.5 MILLIMOLE(S): 236; 224 INJECTION, SOLUTION INTRAVENOUS at 11:39

## 2019-09-23 RX ADMIN — ATORVASTATIN CALCIUM 20 MILLIGRAM(S): 80 TABLET, FILM COATED ORAL at 21:59

## 2019-09-23 RX ADMIN — NYSTATIN CREAM 1 APPLICATION(S): 100000 CREAM TOPICAL at 06:37

## 2019-09-23 RX ADMIN — MEROPENEM 100 MILLIGRAM(S): 1 INJECTION INTRAVENOUS at 21:59

## 2019-09-23 NOTE — PROGRESS NOTE ADULT - ATTENDING COMMENTS
I have seen and examined the patient.  I agree with the surgical resident's note.  The patient is to go to the OR today for skin grafting - the lower angle wehe the fisstual from the stump blow-out will be isulated from the skin graft and VAC using a fistula VAC    Ariel Chambers contact information [phone] I have seen and examined the patient.  I agree with the surgical resident's note.  The patient is to go to the OR today for skin grafting - the lower angle where the fisstula from the stump blow-out will be isolated from the skin graft and VAC using a fistula VAC.    Ariel Chambers contact information [phone] I have seen and examined the patient.  I agree with the surgical resident's note.  The patient is to go to the OR today for skin grafting - the lower angle where the fistula from the stump blow-out will be isolated from the skin graft and VAC using a fistula VAC.    Ariel Chambers contact information [phone]

## 2019-09-23 NOTE — BRIEF OPERATIVE NOTE - OPERATION/FINDINGS
Preparation of abdominal wound bed. Split thickness skin graft from right thigh to abdomen. Wound VAC placement.

## 2019-09-23 NOTE — PROGRESS NOTE ADULT - ASSESSMENT
74 year old female PMH of HFrEF, s/p AICD, asthma, HTN, HLD, atrial fibrillation on eliquis, with previous perforated diverticulosis, Hartmans procedure with multiple RTOR for abd washouts. Admitted 6/30 - 8/2 for feculent peritonitis with septic shock secondary to perforated diverticulitis     Repeat CT A/P 8/19 with extraluminal contrast consistent with dehiscence of the sigmoid colon stump. IR Drain Cultures with E. coli, AHS and Bacteroides. CT with CT A/P (9/12)  with Left hemicolectomy with right lower quadrant colostomy and ventral abdominal wall dehiscence. Slight interval enlargement of the left paracolic gutter fluid collection measuring up to 3 x 3.9 x 10.2 cm (TR x AP x CC) s/p IR upsize of drain 9/13/19.  Repeat bacterial cultures from drainage with ESBL E coli and enterococcus S amp    Blood cultures with 2/4 bottles CoNS - likely contamination; repeat BCX NGTD    On meropenem, unclear that enterococcus needs to be treated for polymicrobial with predominant organism likely E coli    Repeat CT A/P (9/20) with continued extravasation of contrast to flank collection and skin (continued rectal stump leak) - prognosis is poor if no surgical intervention possible on stump leak.     Overall, Intraabdominal Abscess, Rectal Stump Leak, CoNS Bacteremia    --Continue Meropenem 1g IV Q8H  --Continue to follow temperature curve  --Skin Graft as per Plastics Service    I will continue to follow. Please feel free to contact me with any further questions.    Rex Goode M.D.  [facility] Division of Infectious Disease  8AM-5PM: Pager Number [phone]  After Hours (or if no response): Please contact the Infectious Diseases Office at [phone]

## 2019-09-23 NOTE — PROGRESS NOTE ADULT - SUBJECTIVE AND OBJECTIVE BOX
Follow Up:  Intraabdominal abscess    Interval History: More alert today (as per daughter). Continues with some abdominal pain.     REVIEW OF SYSTEMS  [  ] ROS unobtainable because:    [x  ] All other systems negative except as noted below    Constitutional:  [ ] fever [ ] chills  [ ] weight loss  [ ] weakness  Skin:  [ ] rash [ ] phlebitis	  Eyes: [ ] icterus [ ] pain  [ ] discharge	  ENMT: [ ] sore throat  [ ] thrush [ ] ulcers [ ] exudates  Respiratory: [ ] dyspnea [ ] hemoptysis [ ] cough [ ] sputum	  Cardiovascular:  [ ] chest pain [ ] palpitations [ ] edema	  Gastrointestinal:  [ ] nausea [ ] vomiting [ ] diarrhea [ ] constipation [x] pain	  Genitourinary:  [ ] dysuria [ ] frequency [ ] hematuria [ ] discharge [ ] flank pain  [ ] incontinence  Musculoskeletal:  [ ] myalgias [ ] arthralgias [ ] arthritis  [ ] back pain  Neurological:  [ ] headache [ ] seizures  [ ] confusion/altered mental status    Allergies  Coreg (Other)  digoxin (Other; Short breath (Mild to Mod))  IV Contrast (Unknown)  penicillins (Hives)        ANTIMICROBIALS:  meropenem  IVPB 1000 every 8 hours      OTHER MEDS:  MEDICATIONS  (STANDING):  ALBUTerol    0.083% 2.5 every 6 hours PRN  atorvastatin 20 at bedtime  dextrose 40% Gel 15 once PRN  dextrose 50% Injectable 12.5 once  dextrose 50% Injectable 25 once  dextrose 50% Injectable 25 once  enalapril 5 daily  famotidine    Tablet 20 daily  furosemide    Tablet 40 daily  glucagon  Injectable 1 once PRN  HYDROmorphone  Injectable 0.25 every 10 minutes PRN  HYDROmorphone  Injectable 0.5 every 10 minutes PRN  insulin lispro (HumaLOG) corrective regimen sliding scale  every 6 hours  montelukast 10 daily  spironolactone 25 daily  tiotropium 18 MICROgram(s) Capsule 1 daily      Vital Signs Last 24 Hrs  T(C): 36.5 (23 Sep 2019 15:40), Max: 37.3 (23 Sep 2019 01:48)  T(F): 97.7 (23 Sep 2019 15:40), Max: 99.1 (23 Sep 2019 01:48)  HR: 88 (23 Sep 2019 16:30) (75 - 106)  BP: 101/57 (23 Sep 2019 16:30) (100/54 - 127/69)  BP(mean): 75 (23 Sep 2019 16:30) (75 - 79)  RR: 16 (23 Sep 2019 16:30) (16 - 18)  SpO2: 100% (23 Sep 2019 16:30) (92% - 100%)    PHYSICAL EXAMINATION:  General: Alert and Awake, NAD  HEENT: PERRL, EOMI  Neck: Supple  Cardiac: RRR, No M/R/G  Resp: CTAB, No Wh/Rh/Ra  Abdomen: NBS, NT/ND, No HSM, No rigidity or guarding  MSK: No LE edema. No Calf tenderness  : No starr  Skin: No rashes or lesions. Skin is warm and dry to the touch.   Neuro: Alert and Awake. CN 2-12 Grossly intact. Moves all four extremities spontaneously.  Psych: Calm, Pleasant, Cooperative                          7.9    7.80  )-----------( 221      ( 23 Sep 2019 09:47 )             25.7           138  |  100  |  8   ----------------------------<  91  3.5   |  28  |  0.46<L>    Ca    8.5      23 Sep 2019 07:57  Phos  2.0       Mg     1.6             Urinalysis Basic - ( 22 Sep 2019 15:24 )    Color: Yellow / Appearance: Clear / S.031 / pH: x  Gluc: x / Ketone: Trace  / Bili: Negative / Urobili: 3 mg/dL   Blood: x / Protein: Trace / Nitrite: Negative   Leuk Esterase: Moderate / RBC: 2 /hpf / WBC 15 /HPF   Sq Epi: x / Non Sq Epi: 1 /hpf / Bacteria: Negative        MICROBIOLOGY:  v  .Blood  19   No growth at 5 days.  --  --      .Blood  09-15-19   No growth at 5 days.  --  --      .Abscess  19   Numerous Escherichia coli ESBL  Numerous Enterococcus faecalis  --  Escherichia coli ESBL  Enterococcus faecalis      .Blood  19   Growth in aerobic bottle: Staphylococcus epidermidis  --  Coag Negative Staphylococcus      BLOOD PERIPHERAL  19 --  --  --      .Blood  19   No growth at 5 days.  --  --      .Surgical Swab  19   Few Escherichia coli  Rare Enterococcus faecalis  Rare Candida albicans "Susceptibilities not performed"  --  Escherichia coli  Enterococcus faecalis      .Blood  19   No growth at 5 days.  --  --    RADIOLOGY:    EXAM:  CT ABDOMEN AND PELVIS IC                        PROCEDURE DATE:  2019    Large rectal stump leak tracking into the abdominal wall and   to the skin. Additional contrast extravasation into the left flank fluid   collection. Follow Up:  Intraabdominal abscess    Interval History: More alert today (as per daughter). Continues with some abdominal pain.     REVIEW OF SYSTEMS  [  ] ROS unobtainable because:    [x  ] All other systems negative except as noted below    Constitutional:  [ ] fever [ ] chills  [ ] weight loss  [ ] weakness  Skin:  [ ] rash [ ] phlebitis	  Eyes: [ ] icterus [ ] pain  [ ] discharge	  ENMT: [ ] sore throat  [ ] thrush [ ] ulcers [ ] exudates  Respiratory: [ ] dyspnea [ ] hemoptysis [ ] cough [ ] sputum	  Cardiovascular:  [ ] chest pain [ ] palpitations [ ] edema	  Gastrointestinal:  [ ] nausea [ ] vomiting [ ] diarrhea [ ] constipation [x] pain	  Genitourinary:  [ ] dysuria [ ] frequency [ ] hematuria [ ] discharge [ ] flank pain  [ ] incontinence  Musculoskeletal:  [ ] myalgias [ ] arthralgias [ ] arthritis  [ ] back pain  Neurological:  [ ] headache [ ] seizures  [ ] confusion/altered mental status    Allergies  Coreg (Other)  digoxin (Other; Short breath (Mild to Mod))  IV Contrast (Unknown)  penicillins (Hives)        ANTIMICROBIALS:  meropenem  IVPB 1000 every 8 hours      OTHER MEDS:  MEDICATIONS  (STANDING):  ALBUTerol    0.083% 2.5 every 6 hours PRN  atorvastatin 20 at bedtime  dextrose 40% Gel 15 once PRN  dextrose 50% Injectable 12.5 once  dextrose 50% Injectable 25 once  dextrose 50% Injectable 25 once  enalapril 5 daily  famotidine    Tablet 20 daily  furosemide    Tablet 40 daily  glucagon  Injectable 1 once PRN  HYDROmorphone  Injectable 0.25 every 10 minutes PRN  HYDROmorphone  Injectable 0.5 every 10 minutes PRN  insulin lispro (HumaLOG) corrective regimen sliding scale  every 6 hours  montelukast 10 daily  spironolactone 25 daily  tiotropium 18 MICROgram(s) Capsule 1 daily      Vital Signs Last 24 Hrs  T(C): 36.5 (23 Sep 2019 15:40), Max: 37.3 (23 Sep 2019 01:48)  T(F): 97.7 (23 Sep 2019 15:40), Max: 99.1 (23 Sep 2019 01:48)  HR: 88 (23 Sep 2019 16:30) (75 - 106)  BP: 101/57 (23 Sep 2019 16:30) (100/54 - 127/69)  BP(mean): 75 (23 Sep 2019 16:30) (75 - 79)  RR: 16 (23 Sep 2019 16:30) (16 - 18)  SpO2: 100% (23 Sep 2019 16:30) (92% - 100%)    PHYSICAL EXAMINATION:  General: Alert and Awake, NAD  HEENT: PERRL, EOMI  Neck: Supple  Cardiac: RRR, No M/R/G  Resp: CTAB, No Wh/Rh/Ra  Abdomen: +RLQ Ostomy, Midline Wound Vac with minimal output, LLQ drain with purulent drainage, NBS, diffuse abdominal tenderness to palpation  MSK: Trace to 1+ LE edema. Moderate Calf tenderness  Skin: No rashes or lesions. Skin is warm and dry to the touch.   Neuro: Alert and Awake. CN 2-12 Grossly intact. Moves all four extremities spontaneously.  Psych: Calm, Pleasant, Cooperative  Vascular: RUE PICC Line (No surrounding erythema, drainage or tenderness to palpation)                          7.9    7.80  )-----------( 221      ( 23 Sep 2019 09:47 )             25.7           138  |  100  |  8   ----------------------------<  91  3.5   |  28  |  0.46<L>    Ca    8.5      23 Sep 2019 07:57  Phos  2.0       Mg     1.6             Urinalysis Basic - ( 22 Sep 2019 15:24 )    Color: Yellow / Appearance: Clear / S.031 / pH: x  Gluc: x / Ketone: Trace  / Bili: Negative / Urobili: 3 mg/dL   Blood: x / Protein: Trace / Nitrite: Negative   Leuk Esterase: Moderate / RBC: 2 /hpf / WBC 15 /HPF   Sq Epi: x / Non Sq Epi: 1 /hpf / Bacteria: Negative        MICROBIOLOGY:  v  .Blood  19   No growth at 5 days.  --  --      .Blood  09-15-19   No growth at 5 days.  --  --      .Abscess  19   Numerous Escherichia coli ESBL  Numerous Enterococcus faecalis  --  Escherichia coli ESBL  Enterococcus faecalis      .Blood  19   Growth in aerobic bottle: Staphylococcus epidermidis  --  Coag Negative Staphylococcus      BLOOD PERIPHERAL  19 --  --  --      .Blood  19   No growth at 5 days.  --  --      .Surgical Swab  19   Few Escherichia coli  Rare Enterococcus faecalis  Rare Candida albicans "Susceptibilities not performed"  --  Escherichia coli  Enterococcus faecalis      .Blood  19   No growth at 5 days.  --  --    RADIOLOGY:    EXAM:  CT ABDOMEN AND PELVIS IC                        PROCEDURE DATE:  2019    Large rectal stump leak tracking into the abdominal wall and   to the skin. Additional contrast extravasation into the left flank fluid   collection.

## 2019-09-23 NOTE — PROGRESS NOTE ADULT - ASSESSMENT
73 yo Female with PMH of asthma, A fib, dm, HFrEF, pacemaker/AICD, perforated diverticulitis s/p L hemicolectomy and end colostomy w/ multiple washouts and abdominal wall closure w/ strattice (last surgery on 7/11), presents from [facility] due to possible dislodgement of IR drain and increased purulent output from her abdomen. CT scan showing increase in size of L paracolic gutter fluid collection, however drains appear in place. POD 7 s/p IR upsizing, though continued low drain output. Wound vac placed over abdominal wound (9/16/2019). Today is scheduled for split thickness skin graft with Dr. Perez, PRS    - OR today for skin graft with PRS  - continue home medications including anticoagulation  - Diet: NPO for procedure  - Appreciate ID Consult: will Continue Ertapenem 1g IV Q24H; will continue Vancomycin 750mg IV Q12H. Trough prior to fourth dose  - Appreciate Heme/Onc Consult: Iron studies are c/w anemia of chronic inflammation. Given Procrit 40,000U x1 and use pediatric vials to obtain lab work. Will include follow-up with Hematology after discharge for further management.  - Resume Eliquis after surgery     [facility], [id number]

## 2019-09-23 NOTE — CHART NOTE - NSCHARTNOTEFT_GEN_A_CORE
STATUS POST:  Spit thickness skin graph from right thigh to abdominal wound     POST OPERATIVE DAY #: 0    SUBJECTIVE: Pt seen and examined at bedside. Patient resting comfortably in bed. Patient is tired but arousable and responsive to questions.   SOB:  [ ] YES [ X] NO  Chest Discomfort: [ ] YES [ X] NO    Nausea: [ ] YES [ X] NO           Vomiting: [ ] YES [ X] NO   Void: [ ]YES [X]No              Pain Control Adequate: [ X] YES [ ] NO    Vital Signs Last 24 Hrs  T(C): 36.7 (23 Sep 2019 20:06), Max: 37.3 (23 Sep 2019 01:48)  T(F): 98.1 (23 Sep 2019 20:06), Max: 99.1 (23 Sep 2019 01:48)  HR: 82 (23 Sep 2019 20:06) (75 - 106)  BP: 131/75 (23 Sep 2019 20:06) (100/50 - 131/75)  BP(mean): 75 (23 Sep 2019 16:30) (75 - 79)  RR: 18 (23 Sep 2019 20:07) (16 - 18)  SpO2: 95% (23 Sep 2019 20:07) (88% - 100%)  I&O's Summary    22 Sep 2019 07:  -  23 Sep 2019 07:00  --------------------------------------------------------  IN: 645 mL / OUT: 1550 mL / NET: -905 mL    23 Sep 2019 07:  -  23 Sep 2019 21:24  --------------------------------------------------------  IN: 30 mL / OUT: 150 mL / NET: -120 mL      I&O's Detail    22 Sep 2019 07:  -  23 Sep 2019 07:00  --------------------------------------------------------  IN:    IV PiggyBack: 100 mL    Oral Fluid: 520 mL    Other: 25 mL  Total IN: 645 mL    OUT:    Colostomy: 525 mL    Drain: 125 mL    Voided: 900 mL  Total OUT: 1550 mL    Total NET: -905 mL      23 Sep 2019 07:01  -  23 Sep 2019 21:24  --------------------------------------------------------  IN:    dextrose 5% + sodium chloride 0.9%.: 30 mL  Total IN: 30 mL    OUT:    Voided: 150 mL  Total OUT: 150 mL    Total NET: -120 mL      MEDICATIONS  (STANDING):  atorvastatin 20 milliGRAM(s) Oral at bedtime  chlorhexidine 2% Cloths 1 Application(s) Topical daily  dextrose 5% + sodium chloride 0.9%. 1000 milliLiter(s) (30 mL/Hr) IV Continuous <Continuous>  dextrose 5%. 1000 milliLiter(s) (50 mL/Hr) IV Continuous <Continuous>  enalapril 5 milliGRAM(s) Oral daily  famotidine    Tablet 20 milliGRAM(s) Oral daily  insulin lispro (HumaLOG) corrective regimen sliding scale   SubCutaneous every 6 hours  magnesium oxide 400 milliGRAM(s) Oral three times a day with meals  meropenem  IVPB 1000 milliGRAM(s) IV Intermittent every 8 hours  montelukast 10 milliGRAM(s) Oral daily  nystatin Powder 1 Application(s) Topical two times a day  spironolactone 25 milliGRAM(s) Oral daily  tiotropium 18 MICROgram(s) Capsule 1 Capsule(s) Inhalation daily    MEDICATIONS  (PRN):  ALBUTerol    0.083% 2.5 milliGRAM(s) Nebulizer every 6 hours PRN Shortness of Breath and/or Wheezing  dextrose 40% Gel 15 Gram(s) Oral once PRN Blood Glucose LESS THAN 70 milliGRAM(s)/deciliter  glucagon  Injectable 1 milliGRAM(s) IntraMuscular once PRN Glucose LESS THAN 70 milligrams/deciliter      LABS:                        7.9    7.80  )-----------( 221      ( 23 Sep 2019 09:47 )             25.7         138  |  100  |  8   ----------------------------<  91  3.5   |  28  |  0.46<L>    Ca    8.5      23 Sep 2019 07:57  Phos  2.0       Mg     1.6           PT/INR - ( 23 Sep 2019 09:42 )   PT: 14.4 sec;   INR: 1.27 ratio         PTT - ( 23 Sep 2019 09:42 )  PTT:31.3 sec  Urinalysis Basic - ( 22 Sep 2019 15:24 )    Color: Yellow / Appearance: Clear / S.031 / pH: x  Gluc: x / Ketone: Trace  / Bili: Negative / Urobili: 3 mg/dL   Blood: x / Protein: Trace / Nitrite: Negative   Leuk Esterase: Moderate / RBC: 2 /hpf / WBC 15 /HPF   Sq Epi: x / Non Sq Epi: 1 /hpf / Bacteria: Negative        RADIOLOGY & ADDITIONAL STUDIES:    PHYSICAL EXAM:      Constitutional: NAD, resting comfortable in bed     Respiratory: No increased WOB, on RA     Gastrointestinal: soft, compressible, non-distended, mid abdominal wound vac in place with appropriate suction, draining pouch inferior to wound vac with pustulant drainage in bag. Left sided IR drain in place with pustulant drainage in bag. right sided ostomy is pink without gas or stool in bag.     Genitourinary: Primafit in place     Extremities: all four extremities moving spontaneously       A/P: 74y Female s/p Spit thickness skin graph from right thigh to abdominal wound   - Diet: mechanical soft   - Activity: ambulate with assistance   - Labs: CBC, BMP. Mg, Ph  - Pain medication:   - DVT ppx  - If right thigh skin graft donor site dressing has drainage, may reinforce. Please do not remove dressing.

## 2019-09-23 NOTE — PROGRESS NOTE ADULT - SUBJECTIVE AND OBJECTIVE BOX
SUBJECTIVE:  Reports no pain  Denies nausea, vomiting, diarrhea.   Is passing gas and having bowel movements  NPO for skin graft today      OBJECTIVE:    Vital Signs Last 24 Hrs  T(C): 37.3 (23 Sep 2019 01:48), Max: 37.3 (23 Sep 2019 01:48)  T(F): 99.1 (23 Sep 2019 01:48), Max: 99.1 (23 Sep 2019 01:48)  HR: 106 (23 Sep 2019 01:48) (89 - 109)  BP: 111/70 (23 Sep 2019 01:48) (100/65 - 127/69)  BP(mean): --  RR: 18 (23 Sep 2019 01:48) (18 - 18)  SpO2: 94% (23 Sep 2019 01:48) (93% - 95%)       @ 07:01  -   @ 07:00  --------------------------------------------------------  IN: 590 mL / OUT: 950 mL / NET: -360 mL     @ 07:01  -   @ 04:05  --------------------------------------------------------  IN: 645 mL / OUT: 1350 mL / NET: -705 mL        Inpatient Medications:  MEDICATIONS  (STANDING):  atorvastatin 20 milliGRAM(s) Oral at bedtime  chlorhexidine 2% Cloths 1 Application(s) Topical daily  dextrose 5%. 1000 milliLiter(s) (50 mL/Hr) IV Continuous <Continuous>  dextrose 50% Injectable 12.5 Gram(s) IV Push once  dextrose 50% Injectable 25 Gram(s) IV Push once  dextrose 50% Injectable 25 Gram(s) IV Push once  enalapril 5 milliGRAM(s) Oral daily  famotidine    Tablet 20 milliGRAM(s) Oral daily  furosemide    Tablet 40 milliGRAM(s) Oral daily  insulin lispro (HumaLOG) corrective regimen sliding scale   SubCutaneous three times a day before meals  insulin lispro (HumaLOG) corrective regimen sliding scale   SubCutaneous at bedtime  magnesium oxide 400 milliGRAM(s) Oral three times a day with meals  meropenem  IVPB 1000 milliGRAM(s) IV Intermittent every 8 hours  montelukast 10 milliGRAM(s) Oral daily  nystatin Powder 1 Application(s) Topical two times a day  spironolactone 25 milliGRAM(s) Oral daily  tiotropium 18 MICROgram(s) Capsule 1 Capsule(s) Inhalation daily    MEDICATIONS  (PRN):  ALBUTerol    0.083% 2.5 milliGRAM(s) Nebulizer every 6 hours PRN Shortness of Breath and/or Wheezing  dextrose 40% Gel 15 Gram(s) Oral once PRN Blood Glucose LESS THAN 70 milliGRAM(s)/deciliter  glucagon  Injectable 1 milliGRAM(s) IntraMuscular once PRN Glucose LESS THAN 70 milligrams/deciliter        PHYSICAL EXAM:  General: well developed, NAD  Neuro: alert and oriented, no focal deficits, moves all extremities spontaneously  Respiratory: airway patent, respirations unlabored  CVS: regular rate and rhythm  Abdomen: soft, midline abdominal wound dressed with vac c/d/i, infraumbilical pouch draining pus, ostomy pink/viable, Abdominal wound healing well with granulation tissue covering  Extremities: no edema, sensation and movement grossly intact    LABS:         Urinalysis Basic - ( 22 Sep 2019 15:24 )    Color: Yellow / Appearance: Clear / S.031 / pH: x  Gluc: x / Ketone: Trace  / Bili: Negative / Urobili: 3 mg/dL   Blood: x / Protein: Trace / Nitrite: Negative   Leuk Esterase: Moderate / RBC: 2 /hpf / WBC 15 /HPF   Sq Epi: x / Non Sq Epi: 1 /hpf / Bacteria: Negative    CULTURES:  .Blood   @ 12:08   No growth at 5 days.  --  --      .Blood  09-15 @ 23:34   No growth at 5 days.  --  --      .Abscess   @ 22:59   Numerous Escherichia coli ESBL  Numerous Enterococcus faecalis  --  Escherichia coli ESBL  Enterococcus faecalis      .Blood   @ 22:49   Growth in aerobic bottle: Staphylococcus epidermidis  --  Coag Negative Staphylococcus      BLOOD PERIPHERAL   @ 07:17 --    NO ORGANISMS ISOLATED  --      .Blood   @ 18:43   No growth at 5 days.  --  --      .Surgical Swab   @ 18:42   Few Escherichia coli  Rare Enterococcus faecalis  Rare Candida albicans "Susceptibilities not performed"  --  Escherichia coli  Enterococcus faecalis      .Blood   @ 18:00   No growth at 5 days.  --  --      .Body Fluid right abdomen wall   @ 15:21   No growth at 5 days  --    Few polymorphonuclear leukocytes per low power field  No organisms seen per oil power field      .Body Fluid left retroperitoneal  08 @ 15:18   Numerous Escherichia coli  Numerous Alpha hemolytic strep "Susceptibilities not performed"  Moderate Bacteroides thetaiotaomicron "Susceptibilities not performed"  Few Enterococcus faecalis  --  Escherichia coli  Enterococcus faecalis      .Blood   @ 13:08   No growth at 5 days.  --  --      .Blood   @ 17:44   Growth in aerobic bottle: Coag Negative Staphylococcus  Single set isolate, possible contaminant. Contact  Microbiology if susceptibility testing clinically  indicated.  "Due to technical problems, Proteus sp. will Not be reported as part of  the BCID panel until further notice"  ***Blood Panel PCR results on this specimen are available  approximately 3 hours after the Gram stain result.***  Gram stain, PCR, and/or culture results may not always  correspond due to difference in methodologies.  ************************************************************  This PCR assay was performed using [employer].  The following targets are tested for: Enterococcus,  vancomycin resistant enterococci, Listeria monocytogenes,  coagulase negative staphylococci, S. aureus,  methicillin resistant S. aureus, Streptococcus agalactiae  (Group B), S. pneumoniae, S. pyogenes (Group A),  Acinetobacter baumannii, Enterobacter cloacae, E. coli,  Klebsiella oxytoca, K. pneumoniae, Proteus sp.,  Serratia marcescens, Haemophilus influenzae,  Neisseria meningitidis, Pseudomonas aeruginosa, Candida  albicans, C. glabrata, C krusei, C parapsilosis,  C. tropicalis and the KPC resistance gene.  --  Blood Culture PCR      .Sputum   @ 04:17   Moderate Pseudomonas aeruginosa (Carbapenem Resistant) #2  Normal Respiratory Liz present  --  Pseudomonas aeruginosa (Carbapenem Resistant)      .Urine   @ 01:06   No growth  --  --      .Body Fluid  07-23 @ 13:26   Numerous Escherichia coli  Few Coag Negative Staphylococcus  Numerous Escherichia coli #2  --  Escherichia coli  Escherichia coli      .Blood  07-10 @ 20:09   No growth at 5 days.  --  --      .Blood  07-10 @ 20:08   No growth at 5 days.  --  --      .Body Fluid abdominal fluid   @ 17:54   Few Enterococcus faecalis  Few Coag Negative Staphylococcus  --  Enterococcus faecalis  Coag Negative Staphylococcus      .Body Fluid abdominal fluid   @ 17:46   Few Enterococcus faecalis  Few Coag Negative Staphylococcus  --  Enterococcus faecalis  Coag Negative Staphylococcus      .Surgical Swab None Specimen Source:  abdomen, esw   @ 10:15   Moderate Escherichia coli  Moderate Alpha hemolytic strep "Susceptibilities not performed"  Moderate Strep mitis oralis group "Susceptibilities not performed"  Growth in fluid media only Enterococcus faecalis  --  Escherichia coli  Enterococcus faecalis      .Blood   @ 10:04   No growth at 5 days.  --  --      .Urine   @ 10:03   No growth  --  --          IMAGING:  [EXAM: CT ABDOMEN AND PELVIS IC       PROCEDURE DATE: 2019           INTERPRETATION: CLINICAL INFORMATION: Rectal stump leak     COMPARISON: 2019     PROCEDURE:   CT of the Abdomen and Pelvis was performed with intravenous contrast.   Intravenous contrast: 90 ml Omnipaque 350. 10 ml discarded.   Oral contrast: None.   Sagittal and coronal reformats were performed.     FINDINGS:       LOWER CHEST: Small left pleural effusion and near complete left lower lobe   atelectasis. Right middle subsegmental lobe atelectasis. Cardiac device   leads.     LIVER: Within normal limits.   BILE DUCTS: Normal caliber.   GALLBLADDER: Cholecystectomy.   SPLEEN: Within normal limits.   PANCREAS: Atrophic   ADRENALS: Within normal limits.   KIDNEYS/URETERS: Left renal cyst and nonobstructing calculus. Scarring at   the left lower pole.     BLADDER: Within normal limits.   REPRODUCTIVE ORGANS: Fibroid uterus is otherwise within normal limits.     BOWEL: Long Delgado's pouch with right lower quadrant colostomy. There is   large amount of contrast sterilization from the rectal stump which tracks   into the abdominal wall and to the skin. Additionally contrast extravasates   into the left flank fluid collection with a drainage catheter in place.   PERITONEUM: No ascites.   VESSELS: Within normal limits.   RETROPERITONEUM/LYMPH NODES: No lymphadenopathy.   ABDOMINAL WALL: Laxity of the ventral abdominal wall with protrusion of   bowel loops up to the skin.   BONES: Degenerative change.     IMPRESSION: Large rectal stump leak tracking into the abdominal wall and to   the skin. Additional contrast extravasation into the left flank fluid   collection.       ] SUBJECTIVE:  Reports no pain  Denies nausea, vomiting, diarrhea.   Is passing gas and having bowel movements  NPO for skin graft today  go deulnebs overnight for asthma       OBJECTIVE:    Vital Signs Last 24 Hrs  T(C): 37.3 (23 Sep 2019 01:48), Max: 37.3 (23 Sep 2019 01:48)  T(F): 99.1 (23 Sep 2019 01:48), Max: 99.1 (23 Sep 2019 01:48)  HR: 106 (23 Sep 2019 01:48) (89 - 109)  BP: 111/70 (23 Sep 2019 01:48) (100/65 - 127/69)  BP(mean): --  RR: 18 (23 Sep 2019 01:48) (18 - 18)  SpO2: 94% (23 Sep 2019 01:48) (93% - 95%)       @ 07:01  -   @ 07:00  --------------------------------------------------------  IN: 590 mL / OUT: 950 mL / NET: -360 mL     @ 07: @ 04:05  --------------------------------------------------------  IN: 645 mL / OUT: 1350 mL / NET: -705 mL        Inpatient Medications:  MEDICATIONS  (STANDING):  atorvastatin 20 milliGRAM(s) Oral at bedtime  chlorhexidine 2% Cloths 1 Application(s) Topical daily  dextrose 5%. 1000 milliLiter(s) (50 mL/Hr) IV Continuous <Continuous>  dextrose 50% Injectable 12.5 Gram(s) IV Push once  dextrose 50% Injectable 25 Gram(s) IV Push once  dextrose 50% Injectable 25 Gram(s) IV Push once  enalapril 5 milliGRAM(s) Oral daily  famotidine    Tablet 20 milliGRAM(s) Oral daily  furosemide    Tablet 40 milliGRAM(s) Oral daily  insulin lispro (HumaLOG) corrective regimen sliding scale   SubCutaneous three times a day before meals  insulin lispro (HumaLOG) corrective regimen sliding scale   SubCutaneous at bedtime  magnesium oxide 400 milliGRAM(s) Oral three times a day with meals  meropenem  IVPB 1000 milliGRAM(s) IV Intermittent every 8 hours  montelukast 10 milliGRAM(s) Oral daily  nystatin Powder 1 Application(s) Topical two times a day  spironolactone 25 milliGRAM(s) Oral daily  tiotropium 18 MICROgram(s) Capsule 1 Capsule(s) Inhalation daily    MEDICATIONS  (PRN):  ALBUTerol    0.083% 2.5 milliGRAM(s) Nebulizer every 6 hours PRN Shortness of Breath and/or Wheezing  dextrose 40% Gel 15 Gram(s) Oral once PRN Blood Glucose LESS THAN 70 milliGRAM(s)/deciliter  glucagon  Injectable 1 milliGRAM(s) IntraMuscular once PRN Glucose LESS THAN 70 milligrams/deciliter        PHYSICAL EXAM:  General: well developed, NAD  Neuro: alert and oriented, no focal deficits, moves all extremities spontaneously  Respiratory: airway patent, respirations unlabored  CVS: regular rate and rhythm  Abdomen: soft, midline abdominal wound dressed with vac c/d/i, infraumbilical pouch draining pus, ostomy pink/viable, Abdominal wound healing well with granulation tissue covering  Extremities: no edema, sensation and movement grossly intact    LABS:         Urinalysis Basic - ( 22 Sep 2019 15:24 )    Color: Yellow / Appearance: Clear / S.031 / pH: x  Gluc: x / Ketone: Trace  / Bili: Negative / Urobili: 3 mg/dL   Blood: x / Protein: Trace / Nitrite: Negative   Leuk Esterase: Moderate / RBC: 2 /hpf / WBC 15 /HPF   Sq Epi: x / Non Sq Epi: 1 /hpf / Bacteria: Negative    CULTURES:  .Blood   @ 12:08   No growth at 5 days.  --  --      .Blood  09-15 @ 23:34   No growth at 5 days.  --  --      .Abscess   @ 22:59   Numerous Escherichia coli ESBL  Numerous Enterococcus faecalis  --  Escherichia coli ESBL  Enterococcus faecalis      .Blood   @ 22:49   Growth in aerobic bottle: Staphylococcus epidermidis  --  Coag Negative Staphylococcus      BLOOD PERIPHERAL   @ 07:17 --    NO ORGANISMS ISOLATED  --      .Blood   @ 18:43   No growth at 5 days.  --  --      .Surgical Swab   @ 18:42   Few Escherichia coli  Rare Enterococcus faecalis  Rare Candida albicans "Susceptibilities not performed"  --  Escherichia coli  Enterococcus faecalis      .Blood   @ 18:00   No growth at 5 days.  --  --      .Body Fluid right abdomen wall   @ 15:21   No growth at 5 days  --    Few polymorphonuclear leukocytes per low power field  No organisms seen per oil power field      .Body Fluid left retroperitoneal   @ 15:18   Numerous Escherichia coli  Numerous Alpha hemolytic strep "Susceptibilities not performed"  Moderate Bacteroides thetaiotaomicron "Susceptibilities not performed"  Few Enterococcus faecalis  --  Escherichia coli  Enterococcus faecalis      .Blood   @ 13:08   No growth at 5 days.  --  --      .Blood   @ 17:44   Growth in aerobic bottle: Coag Negative Staphylococcus  Single set isolate, possible contaminant. Contact  Microbiology if susceptibility testing clinically  indicated.  "Due to technical problems, Proteus sp. will Not be reported as part of  the BCID panel until further notice"  ***Blood Panel PCR results on this specimen are available  approximately 3 hours after the Gram stain result.***  Gram stain, PCR, and/or culture results may not always  correspond due to difference in methodologies.  ************************************************************  This PCR assay was performed using [employer].  The following targets are tested for: Enterococcus,  vancomycin resistant enterococci, Listeria monocytogenes,  coagulase negative staphylococci, S. aureus,  methicillin resistant S. aureus, Streptococcus agalactiae  (Group B), S. pneumoniae, S. pyogenes (Group A),  Acinetobacter baumannii, Enterobacter cloacae, E. coli,  Klebsiella oxytoca, K. pneumoniae, Proteus sp.,  Serratia marcescens, Haemophilus influenzae,  Neisseria meningitidis, Pseudomonas aeruginosa, Candida  albicans, C. glabrata, C krusei, C parapsilosis,  C. tropicalis and the KPC resistance gene.  --  Blood Culture PCR      .Sputum   @ 04:17   Moderate Pseudomonas aeruginosa (Carbapenem Resistant) #2  Normal Respiratory Liz present  --  Pseudomonas aeruginosa (Carbapenem Resistant)      .Urine   @ 01:06   No growth  --  --      .Body Fluid   @ 13:26   Numerous Escherichia coli  Few Coag Negative Staphylococcus  Numerous Escherichia coli #2  --  Escherichia coli  Escherichia coli      .Blood  07-10 @ 20:09   No growth at 5 days.  --  --      .Blood  07-10 @ 20:08   No growth at 5 days.  --  --      .Body Fluid abdominal fluid   @ 17:54   Few Enterococcus faecalis  Few Coag Negative Staphylococcus  --  Enterococcus faecalis  Coag Negative Staphylococcus      .Body Fluid abdominal fluid   @ 17:46   Few Enterococcus faecalis  Few Coag Negative Staphylococcus  --  Enterococcus faecalis  Coag Negative Staphylococcus      .Surgical Swab None Specimen Source:  abdomen, esw   @ 10:15   Moderate Escherichia coli  Moderate Alpha hemolytic strep "Susceptibilities not performed"  Moderate Strep mitis oralis group "Susceptibilities not performed"  Growth in fluid media only Enterococcus faecalis  --  Escherichia coli  Enterococcus faecalis      .Blood   @ 10:04   No growth at 5 days.  --  --      .Urine   @ 10:03   No growth  --  --          IMAGING:  [EXAM: CT ABDOMEN AND PELVIS IC       PROCEDURE DATE: 2019           INTERPRETATION: CLINICAL INFORMATION: Rectal stump leak     COMPARISON: 2019     PROCEDURE:   CT of the Abdomen and Pelvis was performed with intravenous contrast.   Intravenous contrast: 90 ml Omnipaque 350. 10 ml discarded.   Oral contrast: None.   Sagittal and coronal reformats were performed.     FINDINGS:       LOWER CHEST: Small left pleural effusion and near complete left lower lobe   atelectasis. Right middle subsegmental lobe atelectasis. Cardiac device   leads.     LIVER: Within normal limits.   BILE DUCTS: Normal caliber.   GALLBLADDER: Cholecystectomy.   SPLEEN: Within normal limits.   PANCREAS: Atrophic   ADRENALS: Within normal limits.   KIDNEYS/URETERS: Left renal cyst and nonobstructing calculus. Scarring at   the left lower pole.     BLADDER: Within normal limits.   REPRODUCTIVE ORGANS: Fibroid uterus is otherwise within normal limits.     BOWEL: Long Delgaod's pouch with right lower quadrant colostomy. There is   large amount of contrast sterilization from the rectal stump which tracks   into the abdominal wall and to the skin. Additionally contrast extravasates   into the left flank fluid collection with a drainage catheter in place.   PERITONEUM: No ascites.   VESSELS: Within normal limits.   RETROPERITONEUM/LYMPH NODES: No lymphadenopathy.   ABDOMINAL WALL: Laxity of the ventral abdominal wall with protrusion of   bowel loops up to the skin.   BONES: Degenerative change.     IMPRESSION: Large rectal stump leak tracking into the abdominal wall and to   the skin. Additional contrast extravasation into the left flank fluid   collection.       ]

## 2019-09-24 PROCEDURE — 99232 SBSQ HOSP IP/OBS MODERATE 35: CPT

## 2019-09-24 RX ORDER — INSULIN LISPRO 100/ML
VIAL (ML) SUBCUTANEOUS AT BEDTIME
Refills: 0 | Status: DISCONTINUED | OUTPATIENT
Start: 2019-09-24 | End: 2019-10-04

## 2019-09-24 RX ORDER — ENOXAPARIN SODIUM 100 MG/ML
40 INJECTION SUBCUTANEOUS DAILY
Refills: 0 | Status: DISCONTINUED | OUTPATIENT
Start: 2019-09-24 | End: 2019-09-25

## 2019-09-24 RX ADMIN — NYSTATIN CREAM 1 APPLICATION(S): 100000 CREAM TOPICAL at 17:00

## 2019-09-24 RX ADMIN — NYSTATIN CREAM 1 APPLICATION(S): 100000 CREAM TOPICAL at 06:08

## 2019-09-24 RX ADMIN — MONTELUKAST 10 MILLIGRAM(S): 4 TABLET, CHEWABLE ORAL at 16:49

## 2019-09-24 RX ADMIN — MEROPENEM 100 MILLIGRAM(S): 1 INJECTION INTRAVENOUS at 15:46

## 2019-09-24 RX ADMIN — MEROPENEM 100 MILLIGRAM(S): 1 INJECTION INTRAVENOUS at 06:04

## 2019-09-24 RX ADMIN — ATORVASTATIN CALCIUM 20 MILLIGRAM(S): 80 TABLET, FILM COATED ORAL at 22:48

## 2019-09-24 RX ADMIN — FAMOTIDINE 20 MILLIGRAM(S): 10 INJECTION INTRAVENOUS at 16:49

## 2019-09-24 RX ADMIN — MAGNESIUM OXIDE 400 MG ORAL TABLET 400 MILLIGRAM(S): 241.3 TABLET ORAL at 16:47

## 2019-09-24 RX ADMIN — TIOTROPIUM BROMIDE 1 CAPSULE(S): 18 CAPSULE ORAL; RESPIRATORY (INHALATION) at 16:50

## 2019-09-24 RX ADMIN — Medication 1: at 19:45

## 2019-09-24 RX ADMIN — CHLORHEXIDINE GLUCONATE 1 APPLICATION(S): 213 SOLUTION TOPICAL at 16:47

## 2019-09-24 RX ADMIN — NYSTATIN CREAM 1 APPLICATION(S): 100000 CREAM TOPICAL at 16:48

## 2019-09-24 RX ADMIN — SODIUM CHLORIDE 30 MILLILITER(S): 9 INJECTION, SOLUTION INTRAVENOUS at 06:05

## 2019-09-24 RX ADMIN — MEROPENEM 100 MILLIGRAM(S): 1 INJECTION INTRAVENOUS at 22:48

## 2019-09-24 RX ADMIN — ENOXAPARIN SODIUM 40 MILLIGRAM(S): 100 INJECTION SUBCUTANEOUS at 16:51

## 2019-09-24 RX ADMIN — MAGNESIUM OXIDE 400 MG ORAL TABLET 400 MILLIGRAM(S): 241.3 TABLET ORAL at 16:58

## 2019-09-24 NOTE — DIETITIAN INITIAL EVALUATION ADULT. - PHYSICAL APPEARANCE
other (specify) Ht: 62 inches (per outpatient records) , Wt: 146lbs, BMI: 26.8kg/m2, IBW: 110lbs +/- 10%, %IBW: 133%  Edema: none, Skin: free of pressure injuries, noted with IAD on buttocks and surgical incision    Nutrition focused physical exam conducted deferred at this time per pt preference, will follow-up as able.

## 2019-09-24 NOTE — PROGRESS NOTE ADULT - ASSESSMENT
75 yo Female with PMH of asthma, A fib, dm, HFrEF, pacemaker/AICD, perforated diverticulitis s/p L hemicolectomy and end colostomy w/ multiple washouts and abdominal wall closure w/ strattice (last surgery on 7/11), presents from [facility] due to possible dislodgement of IR drain and increased purulent output from her abdomen. CT scan showing increase in size of L paracolic gutter fluid collection, however drains appear in place. POD 7 s/p IR upsizing, though continued low drain output. Wound vac placed over abdominal wound (9/16/2019). S/p split thickness skin graft on 9/23 with Dr. Perez.     - Diet: soft diet   - May resume Lovenox   - Do NOT resume Eliquis until Wednesday   - Keep abdominal wound vac in place for 7 days  - If right thigh skin graft donor site dressing has drainage, may reinforce. Please do not remove dressing.  - Appreciate ID Consult: will Continue Ertapenem 1g IV Q24H; will continue Vancomycin 750mg IV Q12H. Trough prior to fourth dose  - Appreciate Heme/Onc Consult: Iron studies are c/w anemia of chronic inflammation. Given Procrit 40,000U x1 and use pediatric vials to obtain lab work. Will include follow-up with Hematology after discharge for further management.      [facility], [id number] 75 yo Female with PMH of asthma, A fib, dm, HFrEF, pacemaker/AICD, perforated diverticulitis s/p L hemicolectomy and end colostomy w/ multiple washouts and abdominal wall closure w/ strattice (last surgery on 7/11), presents from [facility] due to possible dislodgement of IR drain and increased purulent output from her abdomen. CT scan showing increase in size of L paracolic gutter fluid collection, however drains appear in place. POD 7 s/p IR upsizing, though continued low drain output. Wound vac placed over abdominal wound (9/16/2019). S/p split thickness skin graft on 9/23 with Dr. Perez.     - Diet: soft diet   - May resume dvt ppx   - Do NOT resume Eliquis until Wednesday   - Keep abdominal wound vac in place for 7 days  - If right thigh skin graft donor site dressing has drainage, may reinforce. Please do not remove dressing.  - Appreciate ID Consult: will Continue Ertapenem 1g IV Q24H; will continue Vancomycin 750mg IV Q12H. Trough prior to fourth dose  - Appreciate Heme/Onc Consult: Iron studies are c/w anemia of chronic inflammation. Given Procrit 40,000U x1 and use pediatric vials to obtain lab work. Will include follow-up with Hematology after discharge for further management.      [facility], [id number]

## 2019-09-24 NOTE — PROGRESS NOTE ADULT - SUBJECTIVE AND OBJECTIVE BOX
NAD, POD#1  AVSS  PE: Abd VAC intact       right thigh donor site C/D/I  Plan: S/p STSG & VAC to Abd          cont VAC          D/w Dr. Perez

## 2019-09-24 NOTE — PROGRESS NOTE ADULT - SUBJECTIVE AND OBJECTIVE BOX
Surgery Progress Note    SUBJECTIVE/ROS: Patient examined at bed side during morning rounds. No acute events overnight.   Taken to the OR yesterday  for Spit thickness skin graph from right thigh to abdominal wound   Denies nausea, vomiting, cp, and sob. Pain is controlled with medications.     Medications:  meropenem  IVPB 1000  enalapril 5  furosemide    Tablet 40  meropenem  IVPB 1000  spironolactone 25      Objective:  Vital Signs Last 24 Hrs  T(C): 36.6 (23 Sep 2019 22:23), Max: 37.3 (23 Sep 2019 01:48)  T(F): 97.9 (23 Sep 2019 22:23), Max: 99.1 (23 Sep 2019 01:48)  HR: 74 (23 Sep 2019 22:23) (74 - 106)  BP: 96/60 (23 Sep 2019 22:23) (96/60 - 131/75)  BP(mean): 75 (23 Sep 2019 16:30) (75 - 79)  RR: 18 (23 Sep 2019 22:23) (16 - 18)  SpO2: 98% (23 Sep 2019 22:23) (88% - 100%)    Physical Exam:    Constitutional: NAD, resting comfortable in bed   Respiratory: No increased WOB, on RA   Gastrointestinal: soft, compressible, non-distended, mid abdominal wound vac in place with appropriate suction, draining pouch inferior to wound vac with pustulant drainage in bag. Left sided IR drain in place with pustulant drainage in bag. right sided ostomy is pink with stool in bag. Genitourinary: Primafit in place   Extremities: all four extremities moving spontaneously     I&O's Summary    22 Sep 2019 07:  -  23 Sep 2019 07:00  --------------------------------------------------------  IN: 645 mL / OUT: 1550 mL / NET: -905 mL    23 Sep 2019 07:  -  24 Sep 2019 01:01  --------------------------------------------------------  IN: 30 mL / OUT: 150 mL / NET: -120 mL        LABS:                        7.9    7.80  )-----------( 221      ( 23 Sep 2019 09:47 )             25.7         138  |  100  |  8   ----------------------------<  91  3.5   |  28  |  0.46<L>    Ca    8.5      23 Sep 2019 07:57  Phos  2.0       Mg     1.6           PT/INR - ( 23 Sep 2019 09:42 )   PT: 14.4 sec;   INR: 1.27 ratio         PTT - ( 23 Sep 2019 09:42 )  PTT:31.3 sec  Urinalysis Basic - ( 22 Sep 2019 15:24 )    Color: Yellow / Appearance: Clear / S.031 / pH: x  Gluc: x / Ketone: Trace  / Bili: Negative / Urobili: 3 mg/dL   Blood: x / Protein: Trace / Nitrite: Negative   Leuk Esterase: Moderate / RBC: 2 /hpf / WBC 15 /HPF   Sq Epi: x / Non Sq Epi: 1 /hpf / Bacteria: Negative      meropenem  IVPB 1000    enalapril 5  furosemide    Tablet 40  meropenem  IVPB 1000  spironolactone 25      RADIOLOGY, EKG & ADDITIONAL TESTS: Reviewed.

## 2019-09-24 NOTE — DIETITIAN INITIAL EVALUATION ADULT. - ADD RECOMMEND
1) Continue current diet as tolerated. 2) Continue Glucerna BID to supplement PO intake 3) Malnutrition alert placed in EMR, discussed with team. 4) Consider addition of multivitamin.

## 2019-09-24 NOTE — DIETITIAN INITIAL EVALUATION ADULT. - ENERGY NEEDS
Pertinent Information: 3 yo Female with PMH of asthma, A fib, dm, HFrEF, pacemaker/AICD, perforated diverticulitis s/p L hemicolectomy and end colostomy w/ multiple washouts and abdominal wall closure w/ strattice (last surgery on 7/11), presents from [place] due to possible dislodgement of IR drain and increased purulent output from her abdomen. pt now s/p IR upsizing, though continued low drain output. Wound vac placed over abdominal wound (9/16/2019). S/p split thickness skin graft on 9/23

## 2019-09-24 NOTE — PROGRESS NOTE ADULT - SUBJECTIVE AND OBJECTIVE BOX
POD #: 1 s/p Split-thickness skin graft to trunk    No acute events overnight    SUBJECTIVE:  Reports no pain, nausea, bleeding, or issues overnight. Sleeping peacefully in the room this AM when examined.     OBJECTIVE:  Vital Signs Last 24 Hrs  T(C): 36.7 (24 Sep 2019 01:29), Max: 36.8 (23 Sep 2019 10:16)  T(F): 98 (24 Sep 2019 01:29), Max: 98.3 (23 Sep 2019 10:16)  HR: 77 (24 Sep 2019 06:00) (74 - 100)  BP: 101/67 (24 Sep 2019 06:00) (96/60 - 131/84)  BP(mean): 75 (23 Sep 2019 16:30) (75 - 79)  RR: 18 (24 Sep 2019 06:00) (16 - 18)  SpO2: 96% (24 Sep 2019 06:00) (88% - 100%)    I&O's Detail    23 Sep 2019 07:01  -  24 Sep 2019 07:00  --------------------------------------------------------  IN:    dextrose 5% + sodium chloride 0.9%: 390 mL  Total IN: 390 mL    OUT:    Voided: 500 mL  Total OUT: 500 mL    Total NET: -110 mL          Inpatient Medications:  MEDICATIONS  (STANDING):  atorvastatin 20 milliGRAM(s) Oral at bedtime  chlorhexidine 2% Cloths 1 Application(s) Topical daily  dextrose 5%. 1000 milliLiter(s) (50 mL/Hr) IV Continuous <Continuous>  dextrose 50% Injectable 12.5 Gram(s) IV Push once  dextrose 50% Injectable 25 Gram(s) IV Push once  dextrose 50% Injectable 25 Gram(s) IV Push once  enalapril 5 milliGRAM(s) Oral daily  enoxaparin Injectable 40 milliGRAM(s) SubCutaneous daily  famotidine    Tablet 20 milliGRAM(s) Oral daily  furosemide    Tablet 40 milliGRAM(s) Oral daily  insulin lispro (HumaLOG) corrective regimen sliding scale   SubCutaneous Before meals and at bedtime  insulin lispro (HumaLOG) corrective regimen sliding scale   SubCutaneous at bedtime  magnesium oxide 400 milliGRAM(s) Oral three times a day with meals  meropenem  IVPB 1000 milliGRAM(s) IV Intermittent every 8 hours  montelukast 10 milliGRAM(s) Oral daily  nystatin Powder 1 Application(s) Topical two times a day  spironolactone 25 milliGRAM(s) Oral daily  tiotropium 18 MICROgram(s) Capsule 1 Capsule(s) Inhalation daily    MEDICATIONS  (PRN):  acetaminophen  IVPB .. 1000 milliGRAM(s) IV Intermittent once PRN Moderate Pain (4 - 6)  ALBUTerol    0.083% 2.5 milliGRAM(s) Nebulizer every 6 hours PRN Shortness of Breath and/or Wheezing  dextrose 40% Gel 15 Gram(s) Oral once PRN Blood Glucose LESS THAN 70 milliGRAM(s)/deciliter  glucagon  Injectable 1 milliGRAM(s) IntraMuscular once PRN Glucose LESS THAN 70 milligrams/deciliter      Physical Examination:  GEN: NAD, resting quietly  NEURO: AAOx3, CN II-XII grossly intact, no focal deficits  PULM: symmetric chest rise bilaterally, no increased WOB  ABD: soft, nontender, nondistended, wound vac in place over graft site holding suction, no output overnight. Fistula pouch in place inferiorly, colosotmy in place, viable and functioning  EXTR: no cyanosis or edema, moving all extremities. Right thigh wrapped with ACE bandage    LABS:                        7.9    7.80  )-----------( 221      ( 23 Sep 2019 09:47 )             25.7       09-23    138  |  100  |  8   ----------------------------<  91  3.5   |  28  |  0.46<L>    Ca    8.5      23 Sep 2019 07:57  Phos  2.0     09-23  Mg     1.6     09-23        CULTURES:  .Blood  09-16 @ 12:08   No growth at 5 days.  --  --      .Blood  09-15 @ 23:34   No growth at 5 days.  --  --      .Abscess  09-13 @ 22:59   Numerous Escherichia coli ESBL  Numerous Enterococcus faecalis  --  Escherichia coli ESBL  Enterococcus faecalis      .Blood  09-12 @ 22:49   Growth in aerobic bottle: Staphylococcus epidermidis  --  Coag Negative Staphylococcus      BLOOD PERIPHERAL  09-03 @ 07:17 --    NO ORGANISMS ISOLATED  --      .Blood  08-30 @ 18:43   No growth at 5 days.  --  --      .Surgical Swab  08-30 @ 18:42   Few Escherichia coli  Rare Enterococcus faecalis  Rare Candida albicans "Susceptibilities not performed"  --  Escherichia coli  Enterococcus faecalis      .Blood  08-30 @ 18:00   No growth at 5 days.  --  --      .Body Fluid right abdomen wall  08-16 @ 15:21   No growth at 5 days  --    Few polymorphonuclear leukocytes per low power field  No organisms seen per oil power field      .Body Fluid left retroperitoneal  08-16 @ 15:18   Numerous Escherichia coli  Numerous Alpha hemolytic strep "Susceptibilities not performed"  Moderate Bacteroides thetaiotaomicron "Susceptibilities not performed"  Few Enterococcus faecalis  --  Escherichia coli  Enterococcus faecalis      .Blood  08-16 @ 13:08   No growth at 5 days.  --  --      .Blood  08-14 @ 17:44   Growth in aerobic bottle: Coag Negative Staphylococcus  Single set isolate, possible contaminant. Contact  Microbiology if susceptibility testing clinically  indicated.  "Due to technical problems, Proteus sp. will Not be reported as part of  the BCID panel until further notice"  ***Blood Panel PCR results on this specimen are available  approximately 3 hours after the Gram stain result.***  Gram stain, PCR, and/or culture results may not always  correspond due to difference in methodologies.  ************************************************************  This PCR assay was performed using [employer].  The following targets are tested for: Enterococcus,  vancomycin resistant enterococci, Listeria monocytogenes,  coagulase negative staphylococci, S. aureus,  methicillin resistant S. aureus, Streptococcus agalactiae  (Group B), S. pneumoniae, S. pyogenes (Group A),  Acinetobacter baumannii, Enterobacter cloacae, E. coli,  Klebsiella oxytoca, K. pneumoniae, Proteus sp.,  Serratia marcescens, Haemophilus influenzae,  Neisseria meningitidis, Pseudomonas aeruginosa, Candida  albicans, C. glabrata, C krusei, C parapsilosis,  C. tropicalis and the KPC resistance gene.  --  Blood Culture PCR      .Sputum  07-29 @ 04:17   Moderate Pseudomonas aeruginosa (Carbapenem Resistant) #2  Normal Respiratory Liz present  --  Pseudomonas aeruginosa (Carbapenem Resistant)      .Urine  07-24 @ 01:06   No growth  --  --      .Body Fluid  07-23 @ 13:26   Numerous Escherichia coli  Few Coag Negative Staphylococcus  Numerous Escherichia coli #2  --  Escherichia coli  Escherichia coli      .Blood  07-10 @ 20:09   No growth at 5 days.  --  --      .Blood  07-10 @ 20:08   No growth at 5 days.  --  --      .Body Fluid abdominal fluid  07-09 @ 17:54   Few Enterococcus faecalis  Few Coag Negative Staphylococcus  --  Enterococcus faecalis  Coag Negative Staphylococcus      .Body Fluid abdominal fluid  07-09 @ 17:46   Few Enterococcus faecalis  Few Coag Negative Staphylococcus  --  Enterococcus faecalis  Coag Negative Staphylococcus      .Surgical Swab None Specimen Source:  abdomen, esw  07-02 @ 10:15   Moderate Escherichia coli  Moderate Alpha hemolytic strep "Susceptibilities not performed"  Moderate Strep mitis oralis group "Susceptibilities not performed"  Growth in fluid media only Enterococcus faecalis  --  Escherichia coli  Enterococcus faecalis      .Blood  07-01 @ 10:04   No growth at 5 days.  --  --      .Urine  07-01 @ 10:03   No growth  --  --

## 2019-09-24 NOTE — PROGRESS NOTE ADULT - ASSESSMENT
74 year old female PMH of HFrEF, s/p AICD, asthma, HTN, HLD, atrial fibrillation on eliquis, with previous perforated diverticulosis, Hartmans procedure with multiple RTOR for abd washouts. Admitted 6/30 - 8/2 for feculent peritonitis with septic shock secondary to perforated diverticulitis     Repeat CT A/P 8/19 with extraluminal contrast consistent with dehiscence of the sigmoid colon stump. IR Drain Cultures with E. coli, AHS and Bacteroides. CT with CT A/P (9/12)  with Left hemicolectomy with right lower quadrant colostomy and ventral abdominal wall dehiscence. Slight interval enlargement of the left paracolic gutter fluid collection measuring up to 3 x 3.9 x 10.2 cm (TR x AP x CC) s/p IR upsize of drain 9/13/19.  Repeat bacterial cultures from drainage with ESBL E coli and enterococcus S amp    Blood cultures with 2/4 bottles CoNS - likely contamination; repeat BCX NGTD    On meropenem, unclear that enterococcus needs to be treated for polymicrobial with predominant organism likely E coli    Repeat CT A/P (9/20) with continued extravasation of contrast to flank collection and skin (continued rectal stump leak) - prognosis is poor if no surgical intervention possible on stump leak.     s/p Skin Graft to Abdomen 9/23    Overall, Intraabdominal Abscess, Rectal Stump Leak, CoNS Bacteremia    --Continue Meropenem 1g IV Q8H  --Continue to follow temperature curve    I will continue to follow. Please feel free to contact me with any further questions.    Rex Goode M.D.  [facility] Division of Infectious Disease  8AM-5PM: Pager Number [phone]  After Hours (or if no response): Please contact the Infectious Diseases Office at [phone]

## 2019-09-24 NOTE — DIETITIAN INITIAL EVALUATION ADULT. - OTHER INFO
Interview kept brief per pt preference, reports feeling tired, cold. Patient reports good PO intake and appetite at home. Admitted from rehab facility after hospitalization in July for hemicolectomy and end colostomy. Pt unable to recall how she was eating there but suspect decreased PO intake during hospitalizations.  Diet recall deferred at this time per pt preferences. Confirms NKFA. Per H&P pt was taking ascorbic acid. Pt denies nausea, vomiting, diarrhea, constipation, however does not some chewing/swallowing difficulty due to poorly fitting dentures. Pt currently on Mechanical soft diet in-house, tolerating well. Pt noted with T2DM, HbA1c: 5.2% suggesting good glycemic control.     Weights: pt unsure of UBW, reports wt ~ 180lbs however unable to specify time frame. Weight ambulatory record on 7/25/18 noted as 188lbs. Current dosing weight noted as 146lbs. --suggesting 22% wt loss x ~1 year.    Since admission pt noted with variable PO intake, however reports PO intake has been improving. Denies any acute GI distress. Colostomy output: 9/22: 525ml. Reports she drank ~ Glucerna supplement yesterday however hasn't been consuming supplement consistently. Pt encouraged to focus on protein-rich foods on tray first and drink supplements between meals. Full Carbohydrate Consistent diet education deferred at this time per pt preference, discussed importance to good PO intake during meals for wound healing. Patient with no nutrition-related questions at this time. Made aware RD remains available as needed.

## 2019-09-24 NOTE — CHART NOTE - NSCHARTNOTEFT_GEN_A_CORE
Upon Nutritional Assessment by the Registered Dietitian your patient was determined to meet criteria / has evidence of the following diagnosis/diagnoses:          [ ]  Mild Protein Calorie Malnutrition        [ ]  Moderate Protein Calorie Malnutrition        [x ] Severe Protein Calorie Malnutrition        [ ] Unspecified Protein Calorie Malnutrition        [ ] Underweight / BMI <19        [ ] Morbid Obesity / BMI > 40      Findings as based on:  [ ] Comprehensive nutrition assessment   [ ] Nutrition Focused Physical Exam  [ x] Other: 22% weight loss x ~ 1 year, meeting </=75% of estimated needs >/= 1 month.       Nutrition Plan/Recommendations:    see initial nutrition assessment in documents for recommendations     RD to remain available and follow-up as medically appropriate.       PROVIDER Section:     By signing this assessment you are acknowledging and agree with the diagnosis/diagnoses assigned by the Registered Dietitian    Comments:

## 2019-09-24 NOTE — PROGRESS NOTE ADULT - ASSESSMENT
75 yo Female with PMH of asthma, A fib, dm, HFrEF, pacemaker/AICD, perforated diverticulitis s/p L hemicolectomy and end colostomy w/ multiple washouts and abdominal wall closure w/ strattice complicated by persistent intraperitoneal abscess 2/2 rectal stump leak now POD 1 s/p split thickness skin graft on 9/23 with Dr. Perez, recovering well.    Plan:  - resume Eliquis 9/25 AM  - Abdominal wound vac until 9/30  - ACE bandage on thigh 48hrs, plastics will take down  - rest of care per primary team

## 2019-09-24 NOTE — PROGRESS NOTE ADULT - SUBJECTIVE AND OBJECTIVE BOX
Follow Up:  Intraabdominal abscess    Interval History: Alert today. Denies abdominal pain. Denies chills or fevers.     REVIEW OF SYSTEMS  [  ] ROS unobtainable because:    [x  ] All other systems negative except as noted below    Constitutional:  [ ] fever [ ] chills  [ ] weight loss  [ ] weakness  Skin:  [ ] rash [ ] phlebitis	  Eyes: [ ] icterus [ ] pain  [ ] discharge	  ENMT: [ ] sore throat  [ ] thrush [ ] ulcers [ ] exudates  Respiratory: [ ] dyspnea [ ] hemoptysis [ ] cough [ ] sputum	  Cardiovascular:  [ ] chest pain [ ] palpitations [ ] edema	  Gastrointestinal:  [ ] nausea [ ] vomiting [ ] diarrhea [ ] constipation [ ] pain	  Genitourinary:  [ ] dysuria [ ] frequency [ ] hematuria [ ] discharge [ ] flank pain  [ ] incontinence  Musculoskeletal:  [ ] myalgias [ ] arthralgias [ ] arthritis  [ ] back pain  Neurological:  [ ] headache [ ] seizures  [ ] confusion/altered mental status    Allergies  Coreg (Other)  digoxin (Other; Short breath (Mild to Mod))  IV Contrast (Unknown)  penicillins (Hives)        ANTIMICROBIALS:  meropenem  IVPB 1000 every 8 hours      OTHER MEDS:  MEDICATIONS  (STANDING):  acetaminophen  IVPB .. 1000 once PRN  ALBUTerol    0.083% 2.5 every 6 hours PRN  atorvastatin 20 at bedtime  dextrose 40% Gel 15 once PRN  dextrose 50% Injectable 12.5 once  dextrose 50% Injectable 25 once  dextrose 50% Injectable 25 once  enalapril 5 daily  enoxaparin Injectable 40 daily  famotidine    Tablet 20 daily  furosemide    Tablet 40 daily  glucagon  Injectable 1 once PRN  insulin lispro (HumaLOG) corrective regimen sliding scale  Before meals and at bedtime  insulin lispro (HumaLOG) corrective regimen sliding scale  at bedtime  montelukast 10 daily  spironolactone 25 daily  tiotropium 18 MICROgram(s) Capsule 1 daily      Vital Signs Last 24 Hrs  T(C): 36.4 (24 Sep 2019 17:03), Max: 37.1 (24 Sep 2019 13:11)  T(F): 97.6 (24 Sep 2019 17:03), Max: 98.7 (24 Sep 2019 13:11)  HR: 85 (24 Sep 2019 17:03) (74 - 88)  BP: 118/75 (24 Sep 2019 17:03) (96/60 - 131/84)  BP(mean): --  RR: 18 (24 Sep 2019 17:03) (18 - 18)  SpO2: 100% (24 Sep 2019 17:03) (88% - 100%)    PHYSICAL EXAMINATION:  General: Alert and Awake, NAD  HEENT: PERRL, EOMI  Neck: Supple  Cardiac: RRR, No M/R/G  Resp: CTAB, No Wh/Rh/Ra  Abdomen: +RLQ Ostomy, Midline Wound Vac with minimal output, LLQ drain with purulent drainage, NBS, diffuse abdominal tenderness to palpation  MSK: Trace to 1+ LE edema. Moderate Calf tenderness  Skin: No rashes or lesions. Skin is warm and dry to the touch.   Neuro: Alert and Awake. CN 2-12 Grossly intact. Moves all four extremities spontaneously.  Psych: Calm, Pleasant, Cooperative  Vascular: RUE PICC Line (No surrounding erythema, drainage or tenderness to palpation)                          7.9    7.80  )-----------( 221      ( 23 Sep 2019 09:47 )             25.7       09-23    138  |  100  |  8   ----------------------------<  91  3.5   |  28  |  0.46<L>    Ca    8.5      23 Sep 2019 07:57  Phos  2.0     09-23  Mg     1.6     09-23            MICROBIOLOGY:  v  .Blood  09-16-19   No growth at 5 days.  --  --      .Blood  09-15-19   No growth at 5 days.  --  --      .Abscess  09-13-19   Numerous Escherichia coli ESBL  Numerous Enterococcus faecalis  --  Escherichia coli ESBL  Enterococcus faecalis      .Blood  09-12-19   Growth in aerobic bottle: Staphylococcus epidermidis  --  Coag Negative Staphylococcus      BLOOD PERIPHERAL  09-03-19 --  --  --      .Blood  08-30-19   No growth at 5 days.  --  --      .Surgical Swab  08-30-19   Few Escherichia coli  Rare Enterococcus faecalis  Rare Candida albicans "Susceptibilities not performed"  --  Escherichia coli  Enterococcus faecalis      .Blood  08-30-19   No growth at 5 days.  --  --      RADIOLOGY:    EXAM:  CT ABDOMEN AND PELVIS IC                        PROCEDURE DATE:  09/20/2019    Large rectal stump leak tracking into the abdominal wall and   to the skin. Additional contrast extravasation into the left flank fluid   collection.

## 2019-09-25 LAB
ANISOCYTOSIS BLD QL: SIGNIFICANT CHANGE UP
BASOPHILS # BLD AUTO: 0.01 K/UL — SIGNIFICANT CHANGE UP (ref 0–0.2)
BASOPHILS NFR BLD AUTO: 0.1 % — SIGNIFICANT CHANGE UP (ref 0–2)
EOSINOPHIL # BLD AUTO: 0.02 K/UL — SIGNIFICANT CHANGE UP (ref 0–0.5)
EOSINOPHIL NFR BLD AUTO: 0.3 % — SIGNIFICANT CHANGE UP (ref 0–6)
HCT VFR BLD CALC: 24.1 % — LOW (ref 34.5–45)
HGB BLD-MCNC: 7.3 G/DL — LOW (ref 11.5–15.5)
HYPOCHROMIA BLD QL: SLIGHT — SIGNIFICANT CHANGE UP
IMM GRANULOCYTES NFR BLD AUTO: 0.4 % — SIGNIFICANT CHANGE UP (ref 0–1.5)
LYMPHOCYTES # BLD AUTO: 2.17 K/UL — SIGNIFICANT CHANGE UP (ref 1–3.3)
LYMPHOCYTES # BLD AUTO: 28.7 % — SIGNIFICANT CHANGE UP (ref 13–44)
MACROCYTES BLD QL: SLIGHT — SIGNIFICANT CHANGE UP
MANUAL SMEAR VERIFICATION: SIGNIFICANT CHANGE UP
MCHC RBC-ENTMCNC: 29.8 PG — SIGNIFICANT CHANGE UP (ref 27–34)
MCHC RBC-ENTMCNC: 30.3 GM/DL — LOW (ref 32–36)
MCV RBC AUTO: 98.4 FL — SIGNIFICANT CHANGE UP (ref 80–100)
MONOCYTES # BLD AUTO: 0.58 K/UL — SIGNIFICANT CHANGE UP (ref 0–0.9)
MONOCYTES NFR BLD AUTO: 7.7 % — SIGNIFICANT CHANGE UP (ref 2–14)
NEUTROPHILS # BLD AUTO: 4.74 K/UL — SIGNIFICANT CHANGE UP (ref 1.8–7.4)
NEUTROPHILS NFR BLD AUTO: 62.8 % — SIGNIFICANT CHANGE UP (ref 43–77)
PLAT MORPH BLD: NORMAL — SIGNIFICANT CHANGE UP
PLATELET # BLD AUTO: 188 K/UL — SIGNIFICANT CHANGE UP (ref 150–400)
POLYCHROMASIA BLD QL SMEAR: SLIGHT — SIGNIFICANT CHANGE UP
RBC # BLD: 2.45 M/UL — LOW (ref 3.8–5.2)
RBC # FLD: 21.8 % — HIGH (ref 10.3–14.5)
RBC BLD AUTO: ABNORMAL
WBC # BLD: 7.55 K/UL — SIGNIFICANT CHANGE UP (ref 3.8–10.5)
WBC # FLD AUTO: 7.55 K/UL — SIGNIFICANT CHANGE UP (ref 3.8–10.5)

## 2019-09-25 PROCEDURE — 71045 X-RAY EXAM CHEST 1 VIEW: CPT | Mod: 26

## 2019-09-25 PROCEDURE — 93010 ELECTROCARDIOGRAM REPORT: CPT

## 2019-09-25 PROCEDURE — 99024 POSTOP FOLLOW-UP VISIT: CPT

## 2019-09-25 RX ORDER — SODIUM CHLORIDE 9 MG/ML
500 INJECTION, SOLUTION INTRAVENOUS ONCE
Refills: 0 | Status: COMPLETED | OUTPATIENT
Start: 2019-09-25 | End: 2019-09-25

## 2019-09-25 RX ORDER — ALBUTEROL 90 UG/1
2.5 AEROSOL, METERED ORAL ONCE
Refills: 0 | Status: COMPLETED | OUTPATIENT
Start: 2019-09-25 | End: 2019-09-25

## 2019-09-25 RX ORDER — APIXABAN 2.5 MG/1
5 TABLET, FILM COATED ORAL EVERY 12 HOURS
Refills: 0 | Status: DISCONTINUED | OUTPATIENT
Start: 2019-09-25 | End: 2019-10-02

## 2019-09-25 RX ADMIN — ATORVASTATIN CALCIUM 20 MILLIGRAM(S): 80 TABLET, FILM COATED ORAL at 22:02

## 2019-09-25 RX ADMIN — Medication 1000 MILLIGRAM(S): at 21:25

## 2019-09-25 RX ADMIN — MEROPENEM 100 MILLIGRAM(S): 1 INJECTION INTRAVENOUS at 22:02

## 2019-09-25 RX ADMIN — Medication 400 MILLIGRAM(S): at 20:53

## 2019-09-25 RX ADMIN — MEROPENEM 100 MILLIGRAM(S): 1 INJECTION INTRAVENOUS at 19:07

## 2019-09-25 RX ADMIN — Medication 5 MILLIGRAM(S): at 05:44

## 2019-09-25 RX ADMIN — Medication 40 MILLIGRAM(S): at 05:44

## 2019-09-25 RX ADMIN — CHLORHEXIDINE GLUCONATE 1 APPLICATION(S): 213 SOLUTION TOPICAL at 19:14

## 2019-09-25 RX ADMIN — MAGNESIUM OXIDE 400 MG ORAL TABLET 400 MILLIGRAM(S): 241.3 TABLET ORAL at 19:14

## 2019-09-25 RX ADMIN — NYSTATIN CREAM 1 APPLICATION(S): 100000 CREAM TOPICAL at 05:44

## 2019-09-25 RX ADMIN — ALBUTEROL 2.5 MILLIGRAM(S): 90 AEROSOL, METERED ORAL at 19:19

## 2019-09-25 RX ADMIN — MEROPENEM 100 MILLIGRAM(S): 1 INJECTION INTRAVENOUS at 05:44

## 2019-09-25 RX ADMIN — SODIUM CHLORIDE 500 MILLILITER(S): 9 INJECTION, SOLUTION INTRAVENOUS at 19:19

## 2019-09-25 RX ADMIN — TIOTROPIUM BROMIDE 1 CAPSULE(S): 18 CAPSULE ORAL; RESPIRATORY (INHALATION) at 19:11

## 2019-09-25 RX ADMIN — NYSTATIN CREAM 1 APPLICATION(S): 100000 CREAM TOPICAL at 19:16

## 2019-09-25 RX ADMIN — SPIRONOLACTONE 25 MILLIGRAM(S): 25 TABLET, FILM COATED ORAL at 05:44

## 2019-09-25 RX ADMIN — MONTELUKAST 10 MILLIGRAM(S): 4 TABLET, CHEWABLE ORAL at 19:10

## 2019-09-25 RX ADMIN — FAMOTIDINE 20 MILLIGRAM(S): 10 INJECTION INTRAVENOUS at 19:11

## 2019-09-25 NOTE — PROGRESS NOTE ADULT - ASSESSMENT
73 yo Female with PMH of asthma, A fib, dm, HFrEF, pacemaker/AICD, perforated diverticulitis s/p L hemicolectomy and end colostomy w/ multiple washouts and abdominal wall closure w/ strattice (last surgery on 7/11), presents from [place] due to possible dislodgement of IR drain and increased purulent output from her abdomen. CT scan showing increase in size of L paracolic gutter fluid collection, however drains appear in place. POD 7 s/p IR upsizing, though continued low drain output. Wound vac placed over abdominal wound (9/16/2019). S/p split thickness skin graft on 9/23 with Dr. Perez.     - Restart eliquis today  - Diet: soft diet   - Keep abdominal wound vac in place for 7 days (9/30)  - If right thigh skin graft donor site dressing has drainage, may reinforce. Please do not remove dressing.  - Appreciate ID Consult: will Continue Ertapenem 1g IV Q24H; will continue Vancomycin 750mg IV Q12H  - Appreciate Heme/Onc Consult: Iron studies are c/w anemia of chronic inflammation. Given Procrit 40,000U x1 and use pediatric vials to obtain lab work. Will include follow-up with Hematology after discharge for further management.      [facility], [phone]

## 2019-09-25 NOTE — PROGRESS NOTE ADULT - SUBJECTIVE AND OBJECTIVE BOX
Surgery Progress Note    SUBJECTIVE/ROS: Patient examined at bed side during morning rounds. No acute events overnight.   Now post op day 2 from skin graft; wound vac in place holding suction  Denies nausea, vomiting, cp, and sob. Pain is controlled with medications.     Medications:  meropenem  IVPB 1000  enalapril 5  furosemide    Tablet 40  meropenem  IVPB 1000  spironolactone 25      Objective:  Vital Signs Last 24 Hrs  T(C): 36.7 (25 Sep 2019 00:36), Max: 37.1 (24 Sep 2019 13:11)  T(F): 98.1 (25 Sep 2019 00:36), Max: 98.7 (24 Sep 2019 13:11)  HR: 99 (25 Sep 2019 00:36) (77 - 99)  BP: 131/87 (25 Sep 2019 00:36) (101/67 - 131/87)  BP(mean): --  RR: 18 (25 Sep 2019 00:36) (18 - 18)  SpO2: 100% (25 Sep 2019 00:36) (96% - 100%)      Physical Exam:    Constitutional: NAD, resting comfortable in bed   Respiratory: No increased WOB, on RA   Gastrointestinal: soft, compressible, non-distended, mid abdominal wound vac in place with appropriate suction, draining pouch inferior to wound vac with purulent drainage. Left sided IR drain in place with pustulant drainage. Right sided ostomy is pink with stool in bag.   Genitourinary: Primafit in place   Extremities: all four extremities moving spontaneously     I&O's Summary    09-23 @ 07:01 - 09-24 @ 07:00  --------------------------------------------------------  IN: 390 mL / OUT: 500 mL / NET: -110 mL    09-24 @ 07:01 - 09-25 @ 04:07  --------------------------------------------------------  IN: 420 mL / OUT: 250 mL / NET: 170 mL        I&O's Detail    23 Sep 2019 07:01  -  24 Sep 2019 07:00  --------------------------------------------------------  IN:    dextrose 5% + sodium chloride 0.9%: 390 mL  Total IN: 390 mL    OUT:    Voided: 500 mL  Total OUT: 500 mL    Total NET: -110 mL      24 Sep 2019 07:01  -  25 Sep 2019 04:07  --------------------------------------------------------  IN:    Oral Fluid: 420 mL  Total IN: 420 mL    OUT:    Colostomy: 50 mL    Voided: 200 mL  Total OUT: 250 mL    Total NET: 170 mL                              7.9    7.80  )-----------( 221      ( 23 Sep 2019 09:47 )             25.7     09-23    138  |  100  |  8   ----------------------------<  91  3.5   |  28  |  0.46<L>    Ca    8.5      23 Sep 2019 07:57  Phos  2.0     09-23  Mg     1.6     09-23      PT/INR - ( 23 Sep 2019 09:42 )   PT: 14.4 sec;   INR: 1.27 ratio         PTT - ( 23 Sep 2019 09:42 )  PTT:31.3 sec      RADIOLOGY, EKG & ADDITIONAL TESTS: Reviewed.

## 2019-09-25 NOTE — CHART NOTE - NSCHARTNOTEFT_GEN_A_CORE
RN called to report patient hypotensive 87/66, and tachycardic to 112 BPM.  Patient seen and examined at bedside.  Patient states that she feels well, She is not in any pain, but she is uncomfortable sitting in the chair and would like to move back to the bed.  She denies chest pain, SOB, nausea/vomiting.  She is having ostomy function and her urine output has been ~300cc for the shift.  She has been tolerating a diet and drinking fluids.  An EKG was obtained and it showed tachycardia to 112 BMP, ventricular paced rhythm.    Physical Exam:   Abdomen: soft, non-tender, ostomy pink and viable, IR drain in place draining thick brown fluid.  VAC in place with good suction, s/s output.  Respiratory: No increased WOB, saturating 98% on 3L NC  Cardiovascular: tachycardic  Psych: A&Ox3  Extremities: Minimal edema in bilateral lower extremities.    Repeat vitals:  100/68, , SPO2 95% RA.    ASSESSMENT/PLAN:  - Monitor HR  - Monitor UOP  - Chest Xray to assess fluid balance  - Duonebs PRN  - CBC stable this AM with no s/s of bleeding, will limit blood draws as patient is a [employer]  - Will continue to monitor      Karissa Watson PA-C  [facility] [phone] RN called to report patient hypotensive 87/66, and tachycardic to 112 BPM.  Patient seen and examined at bedside.  Patient states that she feels well, She is not in any pain, but she is uncomfortable sitting in the chair and would like to move back to the bed.  She denies chest pain, SOB, nausea/vomiting.  She is having ostomy function and her urine output has been ~300cc for the shift.  She has been tolerating a diet and drinking fluids.  An EKG was obtained and it showed tachycardia to 112 BMP, ventricular paced rhythm.    Physical Exam:   Abdomen: soft, non-tender, ostomy pink and viable, IR drain in place draining thick brown fluid.  VAC in place with good suction, s/s output.  Respiratory: No increased WOB, saturating 98% on 3L NC  Cardiovascular: tachycardic  Psych: A&Ox3  Extremities: Minimal edema in bilateral lower extremities.    Repeat vitals:  100/68, , SPO2 87% on RA, improved to 95% on 3L NC.    ASSESSMENT/PLAN:  - Monitor HR  - Monitor UOP  - Chest Xray to assess fluid balance  - Nataliiabs PRN  - CBC stable this AM with no s/s of bleeding, will limit blood draws as patient is a [employer]  - Will continue to monitor      Karissa Watson PA-C  [facility] [phone]

## 2019-09-25 NOTE — PROGRESS NOTE ADULT - ASSESSMENT
73 yo Female with PMH of asthma, A fib, dm, HFrEF, pacemaker/AICD, perforated diverticulitis s/p L hemicolectomy and end colostomy w/ multiple washouts and abdominal wall closure w/ strattice complicated by persistent intraperitoneal abscess 2/2 rectal stump leak now POD 2 s/p split thickness skin graft on 9/23 with Dr. Perez, recovering well.    Plan:  - resume Eliquis this AM  - Abdominal wound vac until 9/30  - ABD in place on R thigh, please reinforce if necessary but  no need to take down  - rest of care per primary team

## 2019-09-25 NOTE — PROGRESS NOTE ADULT - SUBJECTIVE AND OBJECTIVE BOX
Patient without complaint today. AVSS.     Abdomen soft. Wound vac in place, on suction. No surrounding erythema.  Thigh dressing in place.    Plan  Continue vac until Monday 9/30  Leave thigh dressing in place, reinforce with abd pads if necessary  Continue care per primary team  D/w Dr. Perez

## 2019-09-25 NOTE — PROGRESS NOTE ADULT - SUBJECTIVE AND OBJECTIVE BOX
POD #: 2 s/p Split-thickness skin graft to trunk    SUBJECTIVE:  Reports no pain or issues overnight. Denies any leaking or pain from the thigh wound and no change with the vac or pouch on the abdomen.    OBJECTIVE:  Vital Signs Last 24 Hrs  T(C): 36.9 (25 Sep 2019 05:47), Max: 37.1 (24 Sep 2019 13:11)  T(F): 98.5 (25 Sep 2019 05:47), Max: 98.7 (24 Sep 2019 13:11)  HR: 96 (25 Sep 2019 05:47) (80 - 99)  BP: 117/74 (25 Sep 2019 05:47) (109/70 - 131/87)  BP(mean): --  RR: 18 (25 Sep 2019 05:47) (18 - 18)  SpO2: 99% (25 Sep 2019 05:47) (98% - 100%)    I&O's Detail    24 Sep 2019 07:01  -  25 Sep 2019 07:00  --------------------------------------------------------  IN:    Oral Fluid: 420 mL  Total IN: 420 mL    OUT:    Colostomy: 50 mL    Voided: 200 mL  Total OUT: 250 mL    Total NET: 170 mL          Inpatient Medications:  MEDICATIONS  (STANDING):  atorvastatin 20 milliGRAM(s) Oral at bedtime  chlorhexidine 2% Cloths 1 Application(s) Topical daily  dextrose 5%. 1000 milliLiter(s) (50 mL/Hr) IV Continuous <Continuous>  dextrose 50% Injectable 12.5 Gram(s) IV Push once  dextrose 50% Injectable 25 Gram(s) IV Push once  dextrose 50% Injectable 25 Gram(s) IV Push once  enalapril 5 milliGRAM(s) Oral daily  enoxaparin Injectable 40 milliGRAM(s) SubCutaneous daily  famotidine    Tablet 20 milliGRAM(s) Oral daily  furosemide    Tablet 40 milliGRAM(s) Oral daily  insulin lispro (HumaLOG) corrective regimen sliding scale   SubCutaneous Before meals and at bedtime  insulin lispro (HumaLOG) corrective regimen sliding scale   SubCutaneous at bedtime  magnesium oxide 400 milliGRAM(s) Oral three times a day with meals  meropenem  IVPB 1000 milliGRAM(s) IV Intermittent every 8 hours  montelukast 10 milliGRAM(s) Oral daily  nystatin Powder 1 Application(s) Topical two times a day  spironolactone 25 milliGRAM(s) Oral daily  tiotropium 18 MICROgram(s) Capsule 1 Capsule(s) Inhalation daily    MEDICATIONS  (PRN):  acetaminophen  IVPB .. 1000 milliGRAM(s) IV Intermittent once PRN Moderate Pain (4 - 6)  ALBUTerol    0.083% 2.5 milliGRAM(s) Nebulizer every 6 hours PRN Shortness of Breath and/or Wheezing  dextrose 40% Gel 15 Gram(s) Oral once PRN Blood Glucose LESS THAN 70 milliGRAM(s)/deciliter  glucagon  Injectable 1 milliGRAM(s) IntraMuscular once PRN Glucose LESS THAN 70 milligrams/deciliter      Physical Examination:  GEN: NAD, resting quietly  NEURO: AAOx3, CN II-XII grossly intact, no focal deficits  PULM: symmetric chest rise bilaterally, no increased WOB  ABD: soft, nontender, nondistended, wound vac in place over graft site holding suction, no output overnight. Fistula pouch in place inferiorly, colostomy in place, viable and functioning  EXTR: no cyanosis or edema, moving all extremities. Right thigh ACE bandage removed; xerofrom in place, no saturation or oozing. Redressed with ABD    LABS:                        7.9    7.80  )-----------( 221      ( 23 Sep 2019 09:47 )             25.7       09-23    138  |  100  |  8   ----------------------------<  91  3.5   |  28  |  0.46<L>    Ca    8.5      23 Sep 2019 07:57  Phos  2.0     09-23  Mg     1.6     09-23        CULTURES:  .Blood  09-16 @ 12:08   No growth at 5 days.  --  --      .Blood  09-15 @ 23:34   No growth at 5 days.  --  --      .Abscess  09-13 @ 22:59   Numerous Escherichia coli ESBL  Numerous Enterococcus faecalis  --  Escherichia coli ESBL  Enterococcus faecalis      .Blood  09-12 @ 22:49   Growth in aerobic bottle: Staphylococcus epidermidis  --  Coag Negative Staphylococcus      BLOOD PERIPHERAL  09-03 @ 07:17 --    NO ORGANISMS ISOLATED  --      .Blood  08-30 @ 18:43   No growth at 5 days.  --  --      .Surgical Swab  08-30 @ 18:42   Few Escherichia coli  Rare Enterococcus faecalis  Rare Candida albicans "Susceptibilities not performed"  --  Escherichia coli  Enterococcus faecalis      .Blood  08-30 @ 18:00   No growth at 5 days.  --  --      .Body Fluid right abdomen wall  08-16 @ 15:21   No growth at 5 days  --    Few polymorphonuclear leukocytes per low power field  No organisms seen per oil power field      .Body Fluid left retroperitoneal  08-16 @ 15:18   Numerous Escherichia coli  Numerous Alpha hemolytic strep "Susceptibilities not performed"  Moderate Bacteroides thetaiotaomicron "Susceptibilities not performed"  Few Enterococcus faecalis  --  Escherichia coli  Enterococcus faecalis      .Blood  08-16 @ 13:08   No growth at 5 days.  --  --      .Blood  08-14 @ 17:44   Growth in aerobic bottle: Coag Negative Staphylococcus  Single set isolate, possible contaminant. Contact  Microbiology if susceptibility testing clinically  indicated.  "Due to technical problems, Proteus sp. will Not be reported as part of  the BCID panel until further notice"  ***Blood Panel PCR results on this specimen are available  approximately 3 hours after the Gram stain result.***  Gram stain, PCR, and/or culture results may not always  correspond due to difference in methodologies.  ************************************************************  This PCR assay was performed using [employer].  The following targets are tested for: Enterococcus,  vancomycin resistant enterococci, Listeria monocytogenes,  coagulase negative staphylococci, S. aureus,  methicillin resistant S. aureus, Streptococcus agalactiae  (Group B), S. pneumoniae, S. pyogenes (Group A),  Acinetobacter baumannii, Enterobacter cloacae, E. coli,  Klebsiella oxytoca, K. pneumoniae, Proteus sp.,  Serratia marcescens, Haemophilus influenzae,  Neisseria meningitidis, Pseudomonas aeruginosa, Candida  albicans, C. glabrata, C krusei, C parapsilosis,  C. tropicalis and the KPC resistance gene.  --  Blood Culture PCR      .Sputum  07-29 @ 04:17   Moderate Pseudomonas aeruginosa (Carbapenem Resistant) #2  Normal Respiratory Liz present  --  Pseudomonas aeruginosa (Carbapenem Resistant)      .Urine  07-24 @ 01:06   No growth  --  --      .Body Fluid  07-23 @ 13:26   Numerous Escherichia coli  Few Coag Negative Staphylococcus  Numerous Escherichia coli #2  --  Escherichia coli  Escherichia coli      .Blood  07-10 @ 20:09   No growth at 5 days.  --  --      .Blood  07-10 @ 20:08   No growth at 5 days.  --  --      .Body Fluid abdominal fluid  07-09 @ 17:54   Few Enterococcus faecalis  Few Coag Negative Staphylococcus  --  Enterococcus faecalis  Coag Negative Staphylococcus      .Body Fluid abdominal fluid  07-09 @ 17:46   Few Enterococcus faecalis  Few Coag Negative Staphylococcus  --  Enterococcus faecalis  Coag Negative Staphylococcus      .Surgical Swab None Specimen Source:  abdomen, esw  07-02 @ 10:15   Moderate Escherichia coli  Moderate Alpha hemolytic strep "Susceptibilities not performed"  Moderate Strep mitis oralis group "Susceptibilities not performed"  Growth in fluid media only Enterococcus faecalis  --  Escherichia coli  Enterococcus faecalis      .Blood  07-01 @ 10:04   No growth at 5 days.  --  --      .Urine  07-01 @ 10:03   No growth  --  --

## 2019-09-26 PROCEDURE — 99232 SBSQ HOSP IP/OBS MODERATE 35: CPT

## 2019-09-26 PROCEDURE — 99024 POSTOP FOLLOW-UP VISIT: CPT

## 2019-09-26 RX ADMIN — ATORVASTATIN CALCIUM 20 MILLIGRAM(S): 80 TABLET, FILM COATED ORAL at 21:22

## 2019-09-26 RX ADMIN — APIXABAN 5 MILLIGRAM(S): 2.5 TABLET, FILM COATED ORAL at 05:03

## 2019-09-26 RX ADMIN — TIOTROPIUM BROMIDE 1 CAPSULE(S): 18 CAPSULE ORAL; RESPIRATORY (INHALATION) at 12:04

## 2019-09-26 RX ADMIN — NYSTATIN CREAM 1 APPLICATION(S): 100000 CREAM TOPICAL at 05:03

## 2019-09-26 RX ADMIN — Medication 40 MILLIGRAM(S): at 05:03

## 2019-09-26 RX ADMIN — MEROPENEM 100 MILLIGRAM(S): 1 INJECTION INTRAVENOUS at 21:22

## 2019-09-26 RX ADMIN — NYSTATIN CREAM 1 APPLICATION(S): 100000 CREAM TOPICAL at 17:27

## 2019-09-26 RX ADMIN — APIXABAN 5 MILLIGRAM(S): 2.5 TABLET, FILM COATED ORAL at 17:26

## 2019-09-26 RX ADMIN — MEROPENEM 100 MILLIGRAM(S): 1 INJECTION INTRAVENOUS at 05:03

## 2019-09-26 RX ADMIN — MEROPENEM 100 MILLIGRAM(S): 1 INJECTION INTRAVENOUS at 13:33

## 2019-09-26 RX ADMIN — MONTELUKAST 10 MILLIGRAM(S): 4 TABLET, CHEWABLE ORAL at 12:04

## 2019-09-26 RX ADMIN — MAGNESIUM OXIDE 400 MG ORAL TABLET 400 MILLIGRAM(S): 241.3 TABLET ORAL at 12:03

## 2019-09-26 RX ADMIN — MAGNESIUM OXIDE 400 MG ORAL TABLET 400 MILLIGRAM(S): 241.3 TABLET ORAL at 08:47

## 2019-09-26 RX ADMIN — CHLORHEXIDINE GLUCONATE 1 APPLICATION(S): 213 SOLUTION TOPICAL at 12:06

## 2019-09-26 RX ADMIN — FAMOTIDINE 20 MILLIGRAM(S): 10 INJECTION INTRAVENOUS at 12:02

## 2019-09-26 RX ADMIN — MAGNESIUM OXIDE 400 MG ORAL TABLET 400 MILLIGRAM(S): 241.3 TABLET ORAL at 17:25

## 2019-09-26 NOTE — PROGRESS NOTE ADULT - ASSESSMENT
75 yo Female with PMH of asthma, A fib, dm, HFrEF, pacemaker/AICD, perforated diverticulitis s/p L hemicolectomy and end colostomy w/ multiple washouts and abdominal wall closure w/ strattice (last surgery on 7/11), presents from [facility] due to possible dislodgement of IR drain and increased purulent output from her abdomen. CT scan showing increase in size of L paracolic gutter fluid collection, however drains appear in place. POD 7 s/p IR upsizing, though continued low drain output. Wound vac placed over abdominal wound (9/16/2019). S/p split thickness skin graft on 9/23 with Dr. Perez.     - Restart eliquis today  - Diet: soft diet   - Keep abdominal wound vac in place for 7 days (9/30) will be removed by plastics   - If right thigh skin graft donor site dressing has drainage, may reinforce. Please do not remove dressing.  - Appreciate ID Consult: will Continue Ertapenem 1g IV Q24H; will continue Vancomycin 750mg IV Q12H  - Appreciate Heme/Onc Consult: Iron studies are c/w anemia of chronic inflammation. Given Procrit 40,000U x1 and use pediatric vials to obtain lab work. Will include follow-up with Hematology after discharge for further management.  - dispo planning     [facility], [id number] 75 yo Female with PMH of asthma, A fib, dm, HFrEF, pacemaker/AICD, perforated diverticulitis s/p L hemicolectomy and end colostomy w/ multiple washouts and abdominal wall closure w/ strattice (last surgery on 7/11), presents from [place] due to possible dislodgement of IR drain and increased purulent output from her abdomen. CT scan showing increase in size of L paracolic gutter fluid collection, however drains appear in place. POD 8 s/p IR upsizing, though continued low drain output. Wound vac placed over abdominal wound (9/16/2019). S/p split thickness skin graft on 9/23 with Dr. Perez.     - Restart eliquis today  - Diet: soft diet   - Keep abdominal wound vac in place for 7 days (9/30) will be removed by plastics   - If right thigh skin graft donor site dressing has drainage, may reinforce. Please do not remove dressing.  - Appreciate ID Consult: will Continue Ertapenem 1g IV Q24H; will continue Vancomycin 750mg IV Q12H  - Appreciate Heme/Onc Consult: Iron studies are c/w anemia of chronic inflammation. Given Procrit 40,000U x1 and use pediatric vials to obtain lab work. Will include follow-up with Hematology after discharge for further management.  - dispo planning     [facility], [id number]

## 2019-09-26 NOTE — PROGRESS NOTE ADULT - SUBJECTIVE AND OBJECTIVE BOX
Surgery Progress Note    SUBJECTIVE/ROS: Patient examined at bed side during morning rounds. No acute events overnight.   Now post op day 3 from skin graft; wound vac in place holding suction  Patient was hypotensive and tachy yesterday afternoon. Patient was asymptomatic during that time.   EKG and CXR were normal.   Patient given 500cc bolus of fluid.   Denies nausea, vomiting, chest pain, shortness of breath    Medications:  meropenem  IVPB 1000  apixaban 5  enalapril 5  furosemide    Tablet 40  meropenem  IVPB 1000  spironolactone 25      Objective:  Vital Signs Last 24 Hrs  T(C): 36.8 (26 Sep 2019 01:06), Max: 37.1 (25 Sep 2019 14:45)  T(F): 98.2 (26 Sep 2019 01:06), Max: 98.7 (25 Sep 2019 14:45)  HR: 112 (26 Sep 2019 01:06) (90 - 114)  BP: 103/66 (26 Sep 2019 01:06) (87/66 - 117/74)  BP(mean): --  RR: 18 (26 Sep 2019 01:06) (18 - 18)  SpO2: 97% (26 Sep 2019 01:06) (87% - 100%)    Physical Exam:  Constitutional: NAD, resting comfortable in bed   Respiratory: No increased WOB, on RA   Gastrointestinal: soft, compressible, non-distended, mid abdominal wound vac in place with appropriate suction, draining pouch inferior to wound vac with purulent drainage. Left sided IR drain in place with pustulant drainage. Right sided ostomy is pink with stool in bag.   Genitourinary: Primafit in place   Extremities: all four extremities moving spontaneously     I&O's Summary    24 Sep 2019 07:01  -  25 Sep 2019 07:00  --------------------------------------------------------  IN: 520 mL / OUT: 350 mL / NET: 170 mL    25 Sep 2019 07:01  -  26 Sep 2019 03:46  --------------------------------------------------------  IN: 1020 mL / OUT: 250 mL / NET: 770 mL        LABS:                        7.3    7.55  )-----------( 188      ( 25 Sep 2019 10:00 )             24.1               meropenem  IVPB 1000    apixaban 5  enalapril 5  furosemide    Tablet 40  meropenem  IVPB 1000  spironolactone 25      RADIOLOGY, EKG & ADDITIONAL TESTS: Reviewed.

## 2019-09-26 NOTE — PROGRESS NOTE ADULT - SUBJECTIVE AND OBJECTIVE BOX
POD #: 3 s/p Split-thickness skin graft to trunk    SUBJECTIVE:  Reports no pain or issues overnight. Again denies any leaking or pain from the thigh wound and no change with the vac or pouch on the abdomen.    OBJECTIVE:  Vital Signs Last 24 Hrs  T(C): 36.7 (26 Sep 2019 04:56), Max: 37.1 (25 Sep 2019 14:45)  T(F): 98 (26 Sep 2019 04:56), Max: 98.7 (25 Sep 2019 14:45)  HR: 99 (26 Sep 2019 04:56) (90 - 114)  BP: 99/64 (26 Sep 2019 04:56) (87/66 - 103/66)  BP(mean): --  RR: 18 (26 Sep 2019 04:56) (18 - 18)  SpO2: 100% (26 Sep 2019 04:56) (87% - 100%)    I&O's Detail    25 Sep 2019 07:01  -  26 Sep 2019 07:00  --------------------------------------------------------  IN:    IV PiggyBack: 200 mL    Lactated Ringers IV Bolus: 500 mL    Oral Fluid: 420 mL  Total IN: 1120 mL    OUT:    Voided: 550 mL  Total OUT: 550 mL    Total NET: 570 mL          Inpatient Medications:  MEDICATIONS  (STANDING):  apixaban 5 milliGRAM(s) Oral every 12 hours  atorvastatin 20 milliGRAM(s) Oral at bedtime  chlorhexidine 2% Cloths 1 Application(s) Topical daily  dextrose 5%. 1000 milliLiter(s) (50 mL/Hr) IV Continuous <Continuous>  dextrose 50% Injectable 12.5 Gram(s) IV Push once  dextrose 50% Injectable 25 Gram(s) IV Push once  dextrose 50% Injectable 25 Gram(s) IV Push once  enalapril 5 milliGRAM(s) Oral daily  famotidine    Tablet 20 milliGRAM(s) Oral daily  furosemide    Tablet 40 milliGRAM(s) Oral daily  insulin lispro (HumaLOG) corrective regimen sliding scale   SubCutaneous Before meals and at bedtime  insulin lispro (HumaLOG) corrective regimen sliding scale   SubCutaneous at bedtime  magnesium oxide 400 milliGRAM(s) Oral three times a day with meals  meropenem  IVPB 1000 milliGRAM(s) IV Intermittent every 8 hours  montelukast 10 milliGRAM(s) Oral daily  nystatin Powder 1 Application(s) Topical two times a day  spironolactone 25 milliGRAM(s) Oral daily  tiotropium 18 MICROgram(s) Capsule 1 Capsule(s) Inhalation daily    MEDICATIONS  (PRN):  ALBUTerol    0.083% 2.5 milliGRAM(s) Nebulizer every 6 hours PRN Shortness of Breath and/or Wheezing  dextrose 40% Gel 15 Gram(s) Oral once PRN Blood Glucose LESS THAN 70 milliGRAM(s)/deciliter  glucagon  Injectable 1 milliGRAM(s) IntraMuscular once PRN Glucose LESS THAN 70 milligrams/deciliter      Physical Examination:  GEN: NAD, resting quietly  NEURO: AAOx3, CN II-XII grossly intact, no focal deficits  PULM: symmetric chest rise bilaterally, no increased WOB  ABD: soft, nontender, nondistended, wound vac in place over graft site holding suction, no output overnight. Fistula pouch in place inferiorly, colostomy in place, viable and functioning  EXTR: no cyanosis or edema, moving all extremities. Right thigh ABD CDI, left in place      LABS:                        7.3    7.55  )-----------( 188      ( 25 Sep 2019 10:00 )             24.1               CULTURES:  .Blood  09-16 @ 12:08   No growth at 5 days.  --  --      .Blood  09-15 @ 23:34   No growth at 5 days.  --  --      .Abscess  09-13 @ 22:59   Numerous Escherichia coli ESBL  Numerous Enterococcus faecalis  --  Escherichia coli ESBL  Enterococcus faecalis      .Blood  09-12 @ 22:49   Growth in aerobic bottle: Staphylococcus epidermidis  --  Coag Negative Staphylococcus      BLOOD PERIPHERAL  09-03 @ 07:17 --    NO ORGANISMS ISOLATED  --      .Blood  08-30 @ 18:43   No growth at 5 days.  --  --      .Surgical Swab  08-30 @ 18:42   Few Escherichia coli  Rare Enterococcus faecalis  Rare Candida albicans "Susceptibilities not performed"  --  Escherichia coli  Enterococcus faecalis      .Blood  08-30 @ 18:00   No growth at 5 days.  --  --      .Body Fluid right abdomen wall  08-16 @ 15:21   No growth at 5 days  --    Few polymorphonuclear leukocytes per low power field  No organisms seen per oil power field      .Body Fluid left retroperitoneal  08-16 @ 15:18   Numerous Escherichia coli  Numerous Alpha hemolytic strep "Susceptibilities not performed"  Moderate Bacteroides thetaiotaomicron "Susceptibilities not performed"  Few Enterococcus faecalis  --  Escherichia coli  Enterococcus faecalis      .Blood  08-16 @ 13:08   No growth at 5 days.  --  --      .Blood  08-14 @ 17:44   Growth in aerobic bottle: Coag Negative Staphylococcus  Single set isolate, possible contaminant. Contact  Microbiology if susceptibility testing clinically  indicated.  "Due to technical problems, Proteus sp. will Not be reported as part of  the BCID panel until further notice"  ***Blood Panel PCR results on this specimen are available  approximately 3 hours after the Gram stain result.***  Gram stain, PCR, and/or culture results may not always  correspond due to difference in methodologies.  ************************************************************  This PCR assay was performed using [employer].  The following targets are tested for: Enterococcus,  vancomycin resistant enterococci, Listeria monocytogenes,  coagulase negative staphylococci, S. aureus,  methicillin resistant S. aureus, Streptococcus agalactiae  (Group B), S. pneumoniae, S. pyogenes (Group A),  Acinetobacter baumannii, Enterobacter cloacae, E. coli,  Klebsiella oxytoca, K. pneumoniae, Proteus sp.,  Serratia marcescens, Haemophilus influenzae,  Neisseria meningitidis, Pseudomonas aeruginosa, Candida  albicans, C. glabrata, C krusei, C parapsilosis,  C. tropicalis and the KPC resistance gene.  --  Blood Culture PCR      .Sputum  07-29 @ 04:17   Moderate Pseudomonas aeruginosa (Carbapenem Resistant) #2  Normal Respiratory Liz present  --  Pseudomonas aeruginosa (Carbapenem Resistant)      .Urine  07-24 @ 01:06   No growth  --  --      .Body Fluid  07-23 @ 13:26   Numerous Escherichia coli  Few Coag Negative Staphylococcus  Numerous Escherichia coli #2  --  Escherichia coli  Escherichia coli      .Blood  07-10 @ 20:09   No growth at 5 days.  --  --      .Blood  07-10 @ 20:08   No growth at 5 days.  --  --      .Body Fluid abdominal fluid  07-09 @ 17:54   Few Enterococcus faecalis  Few Coag Negative Staphylococcus  --  Enterococcus faecalis  Coag Negative Staphylococcus      .Body Fluid abdominal fluid  07-09 @ 17:46   Few Enterococcus faecalis  Few Coag Negative Staphylococcus  --  Enterococcus faecalis  Coag Negative Staphylococcus      .Surgical Swab None Specimen Source:  abdomen, esw  07-02 @ 10:15   Moderate Escherichia coli  Moderate Alpha hemolytic strep "Susceptibilities not performed"  Moderate Strep mitis oralis group "Susceptibilities not performed"  Growth in fluid media only Enterococcus faecalis  --  Escherichia coli  Enterococcus faecalis      .Blood  07-01 @ 10:04   No growth at 5 days.  --  --      .Urine  07-01 @ 10:03   No growth  --  --

## 2019-09-26 NOTE — PROGRESS NOTE ADULT - ASSESSMENT
74 year old female PMH of HFrEF, s/p AICD, asthma, HTN, HLD, atrial fibrillation on eliquis, with previous perforated diverticulosis, Hartmans procedure with multiple RTOR for abd washouts. Admitted 6/30 - 8/2 for feculent peritonitis with septic shock secondary to perforated diverticulitis     Repeat CT A/P 8/19 with extraluminal contrast consistent with dehiscence of the sigmoid colon stump. IR Drain Cultures with E. coli, AHS and Bacteroides. CT with CT A/P (9/12)  with Left hemicolectomy with right lower quadrant colostomy and ventral abdominal wall dehiscence. Slight interval enlargement of the left paracolic gutter fluid collection measuring up to 3 x 3.9 x 10.2 cm (TR x AP x CC) s/p IR upsize of drain 9/13/19.  Repeat bacterial cultures from drainage with ESBL E coli and enterococcus S amp    Blood cultures with 2/4 bottles CoNS - likely contamination; repeat BCX NGTD    On meropenem, unclear that enterococcus needs to be treated for polymicrobial with predominant organism likely E coli    Repeat CT A/P (9/20) with continued extravasation of contrast to flank collection and skin (continued rectal stump leak) - prognosis is poor if no surgical intervention possible on stump leak.     s/p Skin Graft to Abdomen 9/23    Overall, Intraabdominal Abscess, Hypotension, Rectal Stump Leak, CoNS Bacteremia    --If episode of hypotension, fever or other clinical instability would obtain 2x blood cultures and restart Vancomycin  --Continue Meropenem 1g IV Q8H  --Continue to follow temperature curve    I will continue to follow. Please feel free to contact me with any further questions.    Rex Goode M.D.  [facility] Division of Infectious Disease  8AM-5PM: Pager Number [phone]  After Hours (or if no response): Please contact the Infectious Diseases Office at [phone]

## 2019-09-26 NOTE — PROGRESS NOTE ADULT - ASSESSMENT
75 yo Female with PMH of asthma, A fib, dm, HFrEF, pacemaker/AICD, perforated diverticulitis s/p L hemicolectomy and end colostomy w/ multiple washouts and abdominal wall closure w/ strattice complicated by persistent intraperitoneal abscess 2/2 rectal stump leak now POD 3 s/p split thickness skin graft on 9/23 with Dr. Perez, recovering well.    Plan:  - Abdominal wound vac until 9/30  - ABD in place on R thigh, please reinforce if necessary but  no need to take down  - rest of care per primary team

## 2019-09-26 NOTE — PROGRESS NOTE ADULT - SUBJECTIVE AND OBJECTIVE BOX
Follow Up:  Intraabdominal abscess    Interval History: Yesterday afternoon had episode of hypotension and tachycardia. Was afebrile. No N/V/D.     REVIEW OF SYSTEMS  [  ] ROS unobtainable because:    [ x ] All other systems negative except as noted below    Constitutional:  [ ] fever [ ] chills  [ ] weight loss  [ ] weakness  Skin:  [ ] rash [ ] phlebitis	  Eyes: [ ] icterus [ ] pain  [ ] discharge	  ENMT: [ ] sore throat  [ ] thrush [ ] ulcers [ ] exudates  Respiratory: [ ] dyspnea [ ] hemoptysis [ ] cough [ ] sputum	  Cardiovascular:  [ ] chest pain [ ] palpitations [ ] edema	  Gastrointestinal:  [ ] nausea [ ] vomiting [ ] diarrhea [ ] constipation [x ] pain	  Genitourinary:  [ ] dysuria [ ] frequency [ ] hematuria [ ] discharge [ ] flank pain  [ ] incontinence  Musculoskeletal:  [ ] myalgias [ ] arthralgias [ ] arthritis  [ ] back pain  Neurological:  [ ] headache [ ] seizures  [ ] confusion/altered mental status    Allergies  Coreg (Other)  digoxin (Other; Short breath (Mild to Mod))  IV Contrast (Unknown)  penicillins (Hives)        ANTIMICROBIALS:  meropenem  IVPB 1000 every 8 hours      OTHER MEDS:  MEDICATIONS  (STANDING):  ALBUTerol    0.083% 2.5 every 6 hours PRN  apixaban 5 every 12 hours  atorvastatin 20 at bedtime  dextrose 40% Gel 15 once PRN  dextrose 50% Injectable 12.5 once  dextrose 50% Injectable 25 once  dextrose 50% Injectable 25 once  enalapril 5 daily  famotidine    Tablet 20 daily  furosemide    Tablet 40 daily  glucagon  Injectable 1 once PRN  insulin lispro (HumaLOG) corrective regimen sliding scale  Before meals and at bedtime  insulin lispro (HumaLOG) corrective regimen sliding scale  at bedtime  montelukast 10 daily  spironolactone 25 daily  tiotropium 18 MICROgram(s) Capsule 1 daily      Vital Signs Last 24 Hrs  T(C): 36.8 (26 Sep 2019 09:39), Max: 37.1 (25 Sep 2019 14:45)  T(F): 98.3 (26 Sep 2019 09:39), Max: 98.7 (25 Sep 2019 14:45)  HR: 102 (26 Sep 2019 09:39) (99 - 114)  BP: 100/63 (26 Sep 2019 09:39) (87/66 - 103/66)  BP(mean): --  RR: 18 (26 Sep 2019 09:39) (18 - 18)  SpO2: 99% (26 Sep 2019 09:39) (87% - 100%)    PHYSICAL EXAMINATION:  General: Alert and Awake, NAD  HEENT: PERRL, EOMI  Neck: Supple  Cardiac: RRR, No M/R/G  Resp: CTAB, No Wh/Rh/Ra  Abdomen: NBS, NT/ND, No HSM, No rigidity or guarding  MSK: No LE edema. No Calf tenderness  : No starr  Skin: No rashes or lesions. Skin is warm and dry to the touch.   Neuro: Alert and Awake. CN 2-12 Grossly intact. Moves all four extremities spontaneously.  Psych: Calm, Pleasant, Cooperative                          7.3    7.55  )-----------( 188      ( 25 Sep 2019 10:00 )             24.1                   MICROBIOLOGY:  v  .Blood  09-16-19   No growth at 5 days.  --  --      .Blood  09-15-19   No growth at 5 days.  --  --      .Abscess  09-13-19   Numerous Escherichia coli ESBL  Numerous Enterococcus faecalis  --  Escherichia coli ESBL  Enterococcus faecalis      .Blood  09-12-19   Growth in aerobic bottle: Staphylococcus epidermidis  --  Coag Negative Staphylococcus      BLOOD PERIPHERAL  09-03-19 --  --  --      .Blood  08-30-19   No growth at 5 days.  --  --      .Surgical Swab  08-30-19   Few Escherichia coli  Rare Enterococcus faecalis  Rare Candida albicans "Susceptibilities not performed"  --  Escherichia coli  Enterococcus faecalis      .Blood  08-30-19   No growth at 5 days.  --  --    RADIOLOGY:    EXAM:  XR CHEST PORTABLE URGENT 1V                        PROCEDURE DATE:  09/25/2019    Clear lungs. Follow Up:  Intraabdominal abscess    Interval History: Yesterday afternoon had episode of hypotension and tachycardia. Was afebrile. No N/V/D.     REVIEW OF SYSTEMS  [  ] ROS unobtainable because:    [ x ] All other systems negative except as noted below    Constitutional:  [ ] fever [ ] chills  [ ] weight loss  [ ] weakness  Skin:  [ ] rash [ ] phlebitis	  Eyes: [ ] icterus [ ] pain  [ ] discharge	  ENMT: [ ] sore throat  [ ] thrush [ ] ulcers [ ] exudates  Respiratory: [ ] dyspnea [ ] hemoptysis [ ] cough [ ] sputum	  Cardiovascular:  [ ] chest pain [ ] palpitations [ ] edema	  Gastrointestinal:  [ ] nausea [ ] vomiting [ ] diarrhea [ ] constipation [x ] pain	  Genitourinary:  [ ] dysuria [ ] frequency [ ] hematuria [ ] discharge [ ] flank pain  [ ] incontinence  Musculoskeletal:  [ ] myalgias [ ] arthralgias [ ] arthritis  [ ] back pain  Neurological:  [ ] headache [ ] seizures  [ ] confusion/altered mental status    Allergies  Coreg (Other)  digoxin (Other; Short breath (Mild to Mod))  IV Contrast (Unknown)  penicillins (Hives)        ANTIMICROBIALS:  meropenem  IVPB 1000 every 8 hours      OTHER MEDS:  MEDICATIONS  (STANDING):  ALBUTerol    0.083% 2.5 every 6 hours PRN  apixaban 5 every 12 hours  atorvastatin 20 at bedtime  dextrose 40% Gel 15 once PRN  dextrose 50% Injectable 12.5 once  dextrose 50% Injectable 25 once  dextrose 50% Injectable 25 once  enalapril 5 daily  famotidine    Tablet 20 daily  furosemide    Tablet 40 daily  glucagon  Injectable 1 once PRN  insulin lispro (HumaLOG) corrective regimen sliding scale  Before meals and at bedtime  insulin lispro (HumaLOG) corrective regimen sliding scale  at bedtime  montelukast 10 daily  spironolactone 25 daily  tiotropium 18 MICROgram(s) Capsule 1 daily      Vital Signs Last 24 Hrs  T(C): 36.8 (26 Sep 2019 09:39), Max: 37.1 (25 Sep 2019 14:45)  T(F): 98.3 (26 Sep 2019 09:39), Max: 98.7 (25 Sep 2019 14:45)  HR: 102 (26 Sep 2019 09:39) (99 - 114)  BP: 100/63 (26 Sep 2019 09:39) (87/66 - 103/66)  BP(mean): --  RR: 18 (26 Sep 2019 09:39) (18 - 18)  SpO2: 99% (26 Sep 2019 09:39) (87% - 100%)    PHYSICAL EXAMINATION:  General: Alert and Awake, NAD  HEENT: PERRL, EOMI  Neck: Supple  Cardiac: RRR, No M/R/G  Resp: CTAB, No Wh/Rh/Ra  Abdomen: +RLQ Ostomy, Midline Wound Vac with minimal output, LLQ drain with purulent drainage, NBS, No tenderness to palpation.   MSK: Trace to 1+ LE edema. Moderate Calf tenderness  Skin: No rashes or lesions. Skin is warm and dry to the touch.   Neuro: Alert and Awake. CN 2-12 Grossly intact. Moves all four extremities spontaneously.  Psych: Calm, Pleasant, Cooperative  Vascular: RUE PICC Line (No surrounding erythema, drainage or tenderness to palpation)                            7.3    7.55  )-----------( 188      ( 25 Sep 2019 10:00 )             24.1                   MICROBIOLOGY:  v  .Blood  09-16-19   No growth at 5 days.  --  --      .Blood  09-15-19   No growth at 5 days.  --  --      .Abscess  09-13-19   Numerous Escherichia coli ESBL  Numerous Enterococcus faecalis  --  Escherichia coli ESBL  Enterococcus faecalis      .Blood  09-12-19   Growth in aerobic bottle: Staphylococcus epidermidis  --  Coag Negative Staphylococcus      BLOOD PERIPHERAL  09-03-19 --  --  --      .Blood  08-30-19   No growth at 5 days.  --  --      .Surgical Swab  08-30-19   Few Escherichia coli  Rare Enterococcus faecalis  Rare Candida albicans "Susceptibilities not performed"  --  Escherichia coli  Enterococcus faecalis      .Blood  08-30-19   No growth at 5 days.  --  --    RADIOLOGY:    EXAM:  XR CHEST PORTABLE URGENT 1V                        PROCEDURE DATE:  09/25/2019    Clear lungs.

## 2019-09-27 RX ORDER — CHLORHEXIDINE GLUCONATE 213 G/1000ML
1 SOLUTION TOPICAL
Refills: 0 | Status: DISCONTINUED | OUTPATIENT
Start: 2019-09-27 | End: 2019-10-04

## 2019-09-27 RX ORDER — SODIUM CHLORIDE 9 MG/ML
10 INJECTION INTRAMUSCULAR; INTRAVENOUS; SUBCUTANEOUS
Refills: 0 | Status: DISCONTINUED | OUTPATIENT
Start: 2019-09-27 | End: 2019-10-04

## 2019-09-27 RX ADMIN — MAGNESIUM OXIDE 400 MG ORAL TABLET 400 MILLIGRAM(S): 241.3 TABLET ORAL at 12:06

## 2019-09-27 RX ADMIN — MEROPENEM 100 MILLIGRAM(S): 1 INJECTION INTRAVENOUS at 21:40

## 2019-09-27 RX ADMIN — MAGNESIUM OXIDE 400 MG ORAL TABLET 400 MILLIGRAM(S): 241.3 TABLET ORAL at 09:25

## 2019-09-27 RX ADMIN — NYSTATIN CREAM 1 APPLICATION(S): 100000 CREAM TOPICAL at 06:11

## 2019-09-27 RX ADMIN — CHLORHEXIDINE GLUCONATE 1 APPLICATION(S): 213 SOLUTION TOPICAL at 11:58

## 2019-09-27 RX ADMIN — MEROPENEM 100 MILLIGRAM(S): 1 INJECTION INTRAVENOUS at 17:45

## 2019-09-27 RX ADMIN — APIXABAN 5 MILLIGRAM(S): 2.5 TABLET, FILM COATED ORAL at 18:23

## 2019-09-27 RX ADMIN — CHLORHEXIDINE GLUCONATE 1 APPLICATION(S): 213 SOLUTION TOPICAL at 08:27

## 2019-09-27 RX ADMIN — ATORVASTATIN CALCIUM 20 MILLIGRAM(S): 80 TABLET, FILM COATED ORAL at 21:41

## 2019-09-27 RX ADMIN — APIXABAN 5 MILLIGRAM(S): 2.5 TABLET, FILM COATED ORAL at 06:09

## 2019-09-27 RX ADMIN — FAMOTIDINE 20 MILLIGRAM(S): 10 INJECTION INTRAVENOUS at 12:05

## 2019-09-27 RX ADMIN — MEROPENEM 100 MILLIGRAM(S): 1 INJECTION INTRAVENOUS at 06:09

## 2019-09-27 RX ADMIN — TIOTROPIUM BROMIDE 1 CAPSULE(S): 18 CAPSULE ORAL; RESPIRATORY (INHALATION) at 12:05

## 2019-09-27 RX ADMIN — APIXABAN 5 MILLIGRAM(S): 2.5 TABLET, FILM COATED ORAL at 18:24

## 2019-09-27 NOTE — PROGRESS NOTE ADULT - ASSESSMENT
73 yo Female with PMH of asthma, A fib, dm, HFrEF, pacemaker/AICD, perforated diverticulitis s/p L hemicolectomy and end colostomy w/ multiple washouts and abdominal wall closure w/ strattice (last surgery on 7/11), presents from [place] due to possible dislodgement of IR drain and increased purulent output from her abdomen. CT scan showing increase in size of L paracolic gutter fluid collection, however drains appear in place. POD 8 s/p IR upsizing, though continued low drain output. Wound vac placed over abdominal wound (9/16/2019). S/p split thickness skin graft on 9/23 with Dr. Perez.     - Restart eliquis today  - Diet: soft diet   - Keep abdominal wound vac in place for 7 days (9/30) will be removed by plastics   - If right thigh skin graft donor site dressing has drainage, may reinforce. Please do not remove dressing.  - Appreciate ID Consult: will continue Meropenem 1g IV Q8H  - Appreciate Heme/Onc Consult: Iron studies are c/w anemia of chronic inflammation. Given Procrit 40,000U x1 and use pediatric vials to obtain lab work. Will include follow-up with Hematology after discharge for further management.  - dispo planning - return to [place] if possible     [facility], 1001

## 2019-09-27 NOTE — PROGRESS NOTE ADULT - SUBJECTIVE AND OBJECTIVE BOX
Surgery Progress Note    SUBJECTIVE/ROS: Patient examined at bed side. No acute events overnight.   Denies nausea, vomiting, chest pain, shortness of breath      Medications:  meropenem  IVPB 1000  apixaban 5  enalapril 5  furosemide    Tablet 40  meropenem  IVPB 1000  spironolactone 25      Objective:  Vital Signs Last 24 Hrs  T(C): 36.7 (27 Sep 2019 01:13), Max: 37 (26 Sep 2019 20:59)  T(F): 98 (27 Sep 2019 01:13), Max: 98.6 (26 Sep 2019 20:59)  HR: 100 (27 Sep 2019 01:13) (99 - 106)  BP: 113/75 (27 Sep 2019 01:13) (99/64 - 116/75)  BP(mean): --  RR: 18 (27 Sep 2019 01:13) (18 - 18)  SpO2: 96% (27 Sep 2019 01:13) (96% - 100%)    Physical Exam:  Constitutional: NAD, resting comfortable in bed   Respiratory: No increased WOB, on RA  Gastrointestinal: soft, compressible, non-distended, mid abdominal wound vac in place with appropriate suction, draining pouch inferior to wound vac with purulent drainage. Left sided IR drain in place with pustulant drainage. Right sided ostomy is pink with stool in bag.   Genitourinary: Primafit in place   Extremities: all four extremities moving spontaneously     I&O's Summary    25 Sep 2019 07:01  -  26 Sep 2019 07:00  --------------------------------------------------------  IN: 1120 mL / OUT: 550 mL / NET: 570 mL    26 Sep 2019 07:01  -  27 Sep 2019 01:51  --------------------------------------------------------  IN: 470 mL / OUT: 1545 mL / NET: -1075 mL        LABS:                        7.3    7.55  )-----------( 188      ( 25 Sep 2019 10:00 )             24.1               meropenem  IVPB 1000    apixaban 5  enalapril 5  furosemide    Tablet 40  meropenem  IVPB 1000  spironolactone 25      RADIOLOGY, EKG & ADDITIONAL TESTS: Reviewed.

## 2019-09-27 NOTE — PROGRESS NOTE ADULT - SUBJECTIVE AND OBJECTIVE BOX
POD #: 4 s/p Split-thickness skin graft to trunk      SUBJECTIVE:  Reports no abdominal or thigh pain, no seeping or leakage from the dressing or the vac. Patient had no events overnight and is comfortable this AM.    OBJECTIVE:  Vital Signs Last 24 Hrs  T(C): 36.8 (27 Sep 2019 05:50), Max: 37 (26 Sep 2019 20:59)  T(F): 98.2 (27 Sep 2019 05:50), Max: 98.6 (26 Sep 2019 20:59)  HR: 99 (27 Sep 2019 05:50) (99 - 106)  BP: 101/69 (27 Sep 2019 05:50) (100/63 - 116/75)  BP(mean): --  RR: 18 (27 Sep 2019 05:50) (18 - 18)  SpO2: 97% (27 Sep 2019 05:50) (96% - 99%)    I&O's Detail    26 Sep 2019 07:01  -  27 Sep 2019 07:00  --------------------------------------------------------  IN:    IV PiggyBack: 50 mL    Oral Fluid: 420 mL  Total IN: 470 mL    OUT:    Colostomy: 70 mL    Drain: 50 mL    Other: 25 mL    Voided: 1625 mL  Total OUT: 1770 mL    Total NET: -1300 mL          Inpatient Medications:  MEDICATIONS  (STANDING):  apixaban 5 milliGRAM(s) Oral every 12 hours  atorvastatin 20 milliGRAM(s) Oral at bedtime  chlorhexidine 2% Cloths 1 Application(s) Topical daily  chlorhexidine 4% Liquid 1 Application(s) Topical <User Schedule>  dextrose 5%. 1000 milliLiter(s) (50 mL/Hr) IV Continuous <Continuous>  dextrose 50% Injectable 12.5 Gram(s) IV Push once  dextrose 50% Injectable 25 Gram(s) IV Push once  dextrose 50% Injectable 25 Gram(s) IV Push once  enalapril 5 milliGRAM(s) Oral daily  famotidine    Tablet 20 milliGRAM(s) Oral daily  furosemide    Tablet 40 milliGRAM(s) Oral daily  insulin lispro (HumaLOG) corrective regimen sliding scale   SubCutaneous Before meals and at bedtime  insulin lispro (HumaLOG) corrective regimen sliding scale   SubCutaneous at bedtime  magnesium oxide 400 milliGRAM(s) Oral three times a day with meals  meropenem  IVPB 1000 milliGRAM(s) IV Intermittent every 8 hours  montelukast 10 milliGRAM(s) Oral daily  nystatin Powder 1 Application(s) Topical two times a day  spironolactone 25 milliGRAM(s) Oral daily  tiotropium 18 MICROgram(s) Capsule 1 Capsule(s) Inhalation daily    MEDICATIONS  (PRN):  ALBUTerol    0.083% 2.5 milliGRAM(s) Nebulizer every 6 hours PRN Shortness of Breath and/or Wheezing  dextrose 40% Gel 15 Gram(s) Oral once PRN Blood Glucose LESS THAN 70 milliGRAM(s)/deciliter  glucagon  Injectable 1 milliGRAM(s) IntraMuscular once PRN Glucose LESS THAN 70 milligrams/deciliter  sodium chloride 0.9% lock flush 10 milliLiter(s) IV Push every 1 hour PRN Pre/post blood products, medications, blood draw, and to maintain line patency      Physical Examination:  GEN: NAD, resting quietly  NEURO: AAOx3, CN II-XII grossly intact, no focal deficits  PULM: symmetric chest rise bilaterally, no increased WOB  ABD: soft, nontender, nondistended, wound vac in place over graft site holding suction, no output overnight. Fistula pouch in place inferiorly, colostomy in place, viable and functioning  EXTR: no cyanosis or edema, moving all extremities. Right thigh ABD CDI, left in place    LABS:                        7.3    7.55  )-----------( 188      ( 25 Sep 2019 10:00 )             24.1               CULTURES:  .Blood  09-16 @ 12:08   No growth at 5 days.  --  --      .Blood  09-15 @ 23:34   No growth at 5 days.  --  --      .Abscess  09-13 @ 22:59   Numerous Escherichia coli ESBL  Numerous Enterococcus faecalis  --  Escherichia coli ESBL  Enterococcus faecalis      .Blood  09-12 @ 22:49   Growth in aerobic bottle: Staphylococcus epidermidis  --  Coag Negative Staphylococcus      BLOOD PERIPHERAL  09-03 @ 07:17 --    NO ORGANISMS ISOLATED  --      .Blood  08-30 @ 18:43   No growth at 5 days.  --  --      .Surgical Swab  08-30 @ 18:42   Few Escherichia coli  Rare Enterococcus faecalis  Rare Candida albicans "Susceptibilities not performed"  --  Escherichia coli  Enterococcus faecalis      .Blood  08-30 @ 18:00   No growth at 5 days.  --  --      .Body Fluid right abdomen wall  08-16 @ 15:21   No growth at 5 days  --    Few polymorphonuclear leukocytes per low power field  No organisms seen per oil power field      .Body Fluid left retroperitoneal  08-16 @ 15:18   Numerous Escherichia coli  Numerous Alpha hemolytic strep "Susceptibilities not performed"  Moderate Bacteroides thetaiotaomicron "Susceptibilities not performed"  Few Enterococcus faecalis  --  Escherichia coli  Enterococcus faecalis      .Blood  08-16 @ 13:08   No growth at 5 days.  --  --      .Blood  08-14 @ 17:44   Growth in aerobic bottle: Coag Negative Staphylococcus  Single set isolate, possible contaminant. Contact  Microbiology if susceptibility testing clinically  indicated.  "Due to technical problems, Proteus sp. will Not be reported as part of  the BCID panel until further notice"  ***Blood Panel PCR results on this specimen are available  approximately 3 hours after the Gram stain result.***  Gram stain, PCR, and/or culture results may not always  correspond due to difference in methodologies.  ************************************************************  This PCR assay was performed using [employer].  The following targets are tested for: Enterococcus,  vancomycin resistant enterococci, Listeria monocytogenes,  coagulase negative staphylococci, S. aureus,  methicillin resistant S. aureus, Streptococcus agalactiae  (Group B), S. pneumoniae, S. pyogenes (Group A),  Acinetobacter baumannii, Enterobacter cloacae, E. coli,  Klebsiella oxytoca, K. pneumoniae, Proteus sp.,  Serratia marcescens, Haemophilus influenzae,  Neisseria meningitidis, Pseudomonas aeruginosa, Candida  albicans, C. glabrata, C krusei, C parapsilosis,  C. tropicalis and the KPC resistance gene.  --  Blood Culture PCR      .Sputum  07-29 @ 04:17   Moderate Pseudomonas aeruginosa (Carbapenem Resistant) #2  Normal Respiratory Liz present  --  Pseudomonas aeruginosa (Carbapenem Resistant)      .Urine  07-24 @ 01:06   No growth  --  --      .Body Fluid  07-23 @ 13:26   Numerous Escherichia coli  Few Coag Negative Staphylococcus  Numerous Escherichia coli #2  --  Escherichia coli  Escherichia coli      .Blood  07-10 @ 20:09   No growth at 5 days.  --  --      .Blood  07-10 @ 20:08   No growth at 5 days.  --  --      .Body Fluid abdominal fluid  07-09 @ 17:54   Few Enterococcus faecalis  Few Coag Negative Staphylococcus  --  Enterococcus faecalis  Coag Negative Staphylococcus      .Body Fluid abdominal fluid  07-09 @ 17:46   Few Enterococcus faecalis  Few Coag Negative Staphylococcus  --  Enterococcus faecalis  Coag Negative Staphylococcus      .Surgical Swab None Specimen Source:  abdomen, esw  07-02 @ 10:15   Moderate Escherichia coli  Moderate Alpha hemolytic strep "Susceptibilities not performed"  Moderate Strep mitis oralis group "Susceptibilities not performed"  Growth in fluid media only Enterococcus faecalis  --  Escherichia coli  Enterococcus faecalis      .Blood  07-01 @ 10:04   No growth at 5 days.  --  --      .Urine  07-01 @ 10:03   No growth  --  --          IMAGING:  []

## 2019-09-27 NOTE — CHART NOTE - NSCHARTNOTEFT_GEN_A_CORE
Nutrition follow up     Pt seen for malnutrition follow up.   Interventions in place.     Hospital course as per chart: Pt 73 y/o F with PMH: asthma, A fib, DM, HFrEF, pacemaker/AICD, perforated diverticulitis S/P left hemicolectomy and end colostomy with multiple washouts and abdominal wall closure with strattice (last surgery on 07/11), presents from [facility] due to possible dislodgement of IR drain and increased purulent output from her abdomen, S/P IR upsizing, though continued low drain output; wound vac placed over abdominal wound (09/16/2019), S/P split thickness skin graft on (09/23), recovering well, abdominal wound vac until (09/30) will be removed by plastics.     Source: Patient [x]    Family [ ]     other [x]; Medical record, RN and PCA     Pt forgetful at times as per documentation, confirmed information with RN and PCA. Pt reports fair appetite and PO intake due to disliking mechanical soft diet especially at breakfast. PCA states pt consumes 25% of breakfast, but improves at lunch and consumes 100% at dinner as pt's daughter brings food from home (discussed with provider). Pt reports not having teeth - refused pureed diet. Pt reports drinking Glucerna 1xday or less. Denies nausea or vomiting. Colostomy output as per flow sheets 70 ml (09/27). Reviewed recommendations to optimize PO and protein intake and obtained food preferences within mechanical soft diet.     Diet: Mechanical soft + Consistent Carbohydrate + Glucerna Shake 240mls 2x daily (440kcals, 20g protein)     Enteral /Parenteral Nutrition: n/a    Current Weight: (09/12) 146 pounds - ?accuracy due to fluid accumulation, will continue to monitor.   % Weight Change: n/a    Pertinent Medications: MEDICATIONS  (STANDING):  apixaban 5 milliGRAM(s) Oral every 12 hours  atorvastatin 20 milliGRAM(s) Oral at bedtime  chlorhexidine 2% Cloths 1 Application(s) Topical daily  chlorhexidine 4% Liquid 1 Application(s) Topical <User Schedule>  dextrose 5%. 1000 milliLiter(s) (50 mL/Hr) IV Continuous <Continuous>  dextrose 50% Injectable 12.5 Gram(s) IV Push once  dextrose 50% Injectable 25 Gram(s) IV Push once  dextrose 50% Injectable 25 Gram(s) IV Push once  enalapril 5 milliGRAM(s) Oral daily  famotidine    Tablet 20 milliGRAM(s) Oral daily  furosemide    Tablet 40 milliGRAM(s) Oral daily  insulin lispro (HumaLOG) corrective regimen sliding scale   SubCutaneous Before meals and at bedtime  insulin lispro (HumaLOG) corrective regimen sliding scale   SubCutaneous at bedtime  magnesium oxide 400 milliGRAM(s) Oral three times a day with meals  meropenem  IVPB 1000 milliGRAM(s) IV Intermittent every 8 hours  montelukast 10 milliGRAM(s) Oral daily  nystatin Powder 1 Application(s) Topical two times a day  spironolactone 25 milliGRAM(s) Oral daily  tiotropium 18 MICROgram(s) Capsule 1 Capsule(s) Inhalation daily    MEDICATIONS  (PRN):  ALBUTerol    0.083% 2.5 milliGRAM(s) Nebulizer every 6 hours PRN Shortness of Breath and/or Wheezing  dextrose 40% Gel 15 Gram(s) Oral once PRN Blood Glucose LESS THAN 70 milliGRAM(s)/deciliter  glucagon  Injectable 1 milliGRAM(s) IntraMuscular once PRN Glucose LESS THAN 70 milligrams/deciliter  sodium chloride 0.9% lock flush 10 milliLiter(s) IV Push every 1 hour PRN Pre/post blood products, medications, blood draw, and to maintain line patency    Pertinent Labs: (09/23) Cr 0.46, Phos 2.0 mg/dL<L>  Finger sticks: (09/27) 71 (09/26) 88 - 115   (08/16) HbA1c 5.2%     Skin: no noted pressure injuries as per documentation.   Noted +1generalized, marcia. foot, ankle, and leg edema as per flow sheets.     Estimated Needs:   [x] no change since previous assessment  [ ] recalculated:     Previous Nutrition Diagnosis: [x] Malnutrition; severe     Nutrition Diagnosis is [x] ongoing - being addressed with PO intake encouragement and nutritional supplement.   Goal: pt to meet >75% of estimated needs.    New Nutrition Diagnosis: [x] not applicable    Interventions:     1. Recommend continue mechanical soft + Consistent Carbohydrate diet. Defer diet/fluid consistencies to medical team/SLP recommendations.   2. Continue Glucerna Shake 240mls 2x daily (440kcals, 20g protein) to optimize kcal and protein intake.   3. Recommend Multivitamin to optimize nutrient intake. Spoke to provider.   4. Monitor and replete electrolytes as needed.   5. Encourage PO intake, obtain food preferences (obtained at this time), provide feeding assistance as needed, monitor BG for hypoglycemia. Spoke to RN and PCA.   6. Provided recommendations to optimize PO and protein intake, recommended small frequent meals by ordering nutrient-dense snacks and leaving food away from tray for later consumption during the day or between meals, to start with protein, and sips of supplement throughout the day; reviewed foods with protein and menu order procedures in hospital.   7. Obtain current weight to identify changes if any.     Monitoring and Evaluation:     [x] PO intake [x] Tolerance to diet prescription [x] weights [x] follow up per protocol    RD remains available.  Christin Deleon MS, RDN, #[phone] Nutrition follow up     Pt seen for malnutrition follow up.   Interventions in place.     Hospital course as per chart: Pt 75 y/o F with PMH: asthma, A fib, DM, HFrEF, pacemaker/AICD, perforated diverticulitis S/P left hemicolectomy and end colostomy with multiple washouts and abdominal wall closure with strattice (last surgery on 07/11), presents from [facility] due to possible dislodgement of IR drain and increased purulent output from her abdomen, S/P IR upsizing, though continued low drain output; wound vac placed over abdominal wound (09/16/2019), S/P split thickness skin graft on (09/23), recovering well, abdominal wound vac until (09/30) will be removed by plastics.     Source: Patient [x]    Family [ ]     other [x]; Medical record, RN and PCA     Pt forgetful at times as per documentation, confirmed information with RN and PCA. Pt reports fair appetite and PO intake due to disliking mechanical soft diet especially at breakfast. PCA states pt consumes 25% of breakfast, but improves at lunch and consumes 100% at dinner as pt's daughter brings food from home (discussed with provider). Pt reports not having teeth - refused pureed diet. Pt reports drinking Glucerna 1xday or less. Denies nausea or vomiting. Colostomy output as per flow sheets 70 ml (09/27). Reviewed recommendations to optimize PO and protein intake and obtained food preferences within mechanical soft diet.     Diet: Mechanical soft + Consistent Carbohydrate + Glucerna Shake 240mls 2x daily (440kcals, 20g protein)     Enteral /Parenteral Nutrition: n/a    Current Weight: (09/12) 146 pounds - ?accuracy due to fluid accumulation, will continue to monitor.   % Weight Change: n/a    Pertinent Medications: MEDICATIONS  (STANDING):  apixaban 5 milliGRAM(s) Oral every 12 hours  atorvastatin 20 milliGRAM(s) Oral at bedtime  chlorhexidine 2% Cloths 1 Application(s) Topical daily  chlorhexidine 4% Liquid 1 Application(s) Topical <User Schedule>  dextrose 5%. 1000 milliLiter(s) (50 mL/Hr) IV Continuous <Continuous>  dextrose 50% Injectable 12.5 Gram(s) IV Push once  dextrose 50% Injectable 25 Gram(s) IV Push once  dextrose 50% Injectable 25 Gram(s) IV Push once  enalapril 5 milliGRAM(s) Oral daily  famotidine    Tablet 20 milliGRAM(s) Oral daily  furosemide    Tablet 40 milliGRAM(s) Oral daily  insulin lispro (HumaLOG) corrective regimen sliding scale   SubCutaneous Before meals and at bedtime  insulin lispro (HumaLOG) corrective regimen sliding scale   SubCutaneous at bedtime  magnesium oxide 400 milliGRAM(s) Oral three times a day with meals  meropenem  IVPB 1000 milliGRAM(s) IV Intermittent every 8 hours  montelukast 10 milliGRAM(s) Oral daily  nystatin Powder 1 Application(s) Topical two times a day  spironolactone 25 milliGRAM(s) Oral daily  tiotropium 18 MICROgram(s) Capsule 1 Capsule(s) Inhalation daily    MEDICATIONS  (PRN):  ALBUTerol    0.083% 2.5 milliGRAM(s) Nebulizer every 6 hours PRN Shortness of Breath and/or Wheezing  dextrose 40% Gel 15 Gram(s) Oral once PRN Blood Glucose LESS THAN 70 milliGRAM(s)/deciliter  glucagon  Injectable 1 milliGRAM(s) IntraMuscular once PRN Glucose LESS THAN 70 milligrams/deciliter  sodium chloride 0.9% lock flush 10 milliLiter(s) IV Push every 1 hour PRN Pre/post blood products, medications, blood draw, and to maintain line patency    Pertinent Labs: (09/23) Cr 0.46, Phos 2.0 mg/dL<L>  Finger sticks: (09/27) 71 (09/26) 88 - 115   (08/16) HbA1c 5.2%     Skin: no noted pressure injuries as per documentation.   Noted +1generalized, marcia. foot, ankle, and leg edema as per flow sheets.     Estimated Needs:   [x] no change since previous assessment  [ ] recalculated:     Previous Nutrition Diagnosis: [x] Malnutrition; severe     Nutrition Diagnosis is [x] ongoing - being addressed with PO intake encouragement and nutritional supplement.   Goal: pt to meet >75% of estimated needs.    New Nutrition Diagnosis: [x] not applicable    Interventions:     1. Recommend continue mechanical soft + Consistent Carbohydrate diet. Defer diet/fluid consistencies to medical team/SLP recommendations. Will continue to monitor and adjust as needed.   2. Continue Glucerna Shake 240mls 2x daily (440kcals, 20g protein) to optimize kcal and protein intake.   3. Recommend Multivitamin to optimize nutrient intake. Spoke to provider.   4. Monitor and replete electrolytes as needed.   5. Encourage PO intake, obtain food preferences (obtained at this time), provide feeding assistance as needed, monitor BG for hypoglycemia. Spoke to RN and PCA.   6. Provided recommendations to optimize PO and protein intake, recommended small frequent meals by ordering nutrient-dense snacks and leaving food away from tray for later consumption during the day or between meals, to start with protein, and sips of supplement throughout the day; reviewed foods with protein and menu order procedures in hospital.   7. Obtain current weight to identify changes if any.     Monitoring and Evaluation:     [x] PO intake [x] Tolerance to diet prescription [x] weights [x] follow up per protocol    RD remains available.  Christin Deleon MS, RDN, #[phone] Nutrition follow up     Pt seen for malnutrition follow up.   Interventions in place.     Hospital course as per chart: Pt 75 y/o F with PMH: asthma, A fib, DM, HFrEF, pacemaker/AICD, perforated diverticulitis S/P left hemicolectomy and end colostomy with multiple washouts and abdominal wall closure with strattice (last surgery on 07/11), presents from [facility] due to possible dislodgement of IR drain and increased purulent output from her abdomen, S/P IR upsizing, though continued low drain output; wound vac placed over abdominal wound (09/16/2019), S/P split thickness skin graft on (09/23), recovering well, abdominal wound vac until (09/30) will be removed by plastics.     Source: Patient [x]    Family [ ]     other [x]; Medical record, RN and PCA     Pt forgetful at times as per documentation, confirmed information with RN and PCA. Pt reports fair appetite and PO intake due to disliking mechanical soft diet especially at breakfast. PCA states pt consumes 25% of breakfast, but improves at lunch and consumes 100% at dinner as pt's daughter brings food from home (discussed with provider). Pt reports not having teeth - refused pureed diet. Pt reports drinking Glucerna 1xday or less. Denies nausea or vomiting. Colostomy output as per flow sheets 70 ml (09/27). Reviewed recommendations to optimize PO and protein intake and obtained food preferences within mechanical soft diet.     Diet: Mechanical soft + Consistent Carbohydrate + Glucerna Shake 240mls 2x daily (440kcals, 20g protein)     Enteral /Parenteral Nutrition: n/a    Current Weight: (09/12) 146 pounds - ?accuracy due to fluid accumulation, will continue to monitor.   % Weight Change: n/a    Pertinent Medications: MEDICATIONS  (STANDING):  apixaban 5 milliGRAM(s) Oral every 12 hours  atorvastatin 20 milliGRAM(s) Oral at bedtime  chlorhexidine 2% Cloths 1 Application(s) Topical daily  chlorhexidine 4% Liquid 1 Application(s) Topical <User Schedule>  dextrose 5%. 1000 milliLiter(s) (50 mL/Hr) IV Continuous <Continuous>  dextrose 50% Injectable 12.5 Gram(s) IV Push once  dextrose 50% Injectable 25 Gram(s) IV Push once  dextrose 50% Injectable 25 Gram(s) IV Push once  enalapril 5 milliGRAM(s) Oral daily  famotidine    Tablet 20 milliGRAM(s) Oral daily  furosemide    Tablet 40 milliGRAM(s) Oral daily  insulin lispro (HumaLOG) corrective regimen sliding scale   SubCutaneous Before meals and at bedtime  insulin lispro (HumaLOG) corrective regimen sliding scale   SubCutaneous at bedtime  magnesium oxide 400 milliGRAM(s) Oral three times a day with meals  meropenem  IVPB 1000 milliGRAM(s) IV Intermittent every 8 hours  montelukast 10 milliGRAM(s) Oral daily  nystatin Powder 1 Application(s) Topical two times a day  spironolactone 25 milliGRAM(s) Oral daily  tiotropium 18 MICROgram(s) Capsule 1 Capsule(s) Inhalation daily    MEDICATIONS  (PRN):  ALBUTerol    0.083% 2.5 milliGRAM(s) Nebulizer every 6 hours PRN Shortness of Breath and/or Wheezing  dextrose 40% Gel 15 Gram(s) Oral once PRN Blood Glucose LESS THAN 70 milliGRAM(s)/deciliter  glucagon  Injectable 1 milliGRAM(s) IntraMuscular once PRN Glucose LESS THAN 70 milligrams/deciliter  sodium chloride 0.9% lock flush 10 milliLiter(s) IV Push every 1 hour PRN Pre/post blood products, medications, blood draw, and to maintain line patency    Pertinent Labs: (09/23) Cr 0.46, Phos 2.0 mg/dL<L>  Finger sticks: (09/27) 71 (09/26) 88 - 115   (08/16) HbA1c 5.2%     Skin: no noted pressure injuries as per documentation.   Noted +1generalized, marcia. foot, ankle, and leg edema as per flow sheets.     Estimated Needs:   [x] no change since previous assessment  [ ] recalculated:     Previous Nutrition Diagnosis: [x] Malnutrition; severe     Nutrition Diagnosis is [x] ongoing - being addressed with PO intake encouragement and nutritional supplement.   Goal: pt to meet >75% of estimated needs.    New Nutrition Diagnosis: [x] not applicable    Interventions:     1. Recommend continue mechanical soft + Consistent Carbohydrate diet. Defer diet/fluid consistencies to medical team/SLP recommendations. Will continue to monitor and adjust as needed.   2. Continue Glucerna Shake 240mls 2x daily (440kcals, 20g protein) to optimize kcal and protein intake.   3. Recommend Multivitamin to optimize nutrient intake. Spoke to provider.   4. Monitor and replete electrolytes as needed.   5. Encourage PO intake, obtain food preferences (obtained at this time), provide feeding assistance as needed, monitor BG for hypoglycemia (RN aware).    6. Provided recommendations to optimize PO and protein intake, recommended small frequent meals by ordering nutrient-dense snacks and leaving food away from tray for later consumption during the day or between meals, to start with protein, and sips of supplement throughout the day; reviewed foods with protein and menu order procedures in hospital.   7. Obtain current weight to identify changes if any.     Monitoring and Evaluation:     [x] PO intake [x] Tolerance to diet prescription [x] weights [x] follow up per protocol    RD remains available.  Christin Deleon MS, RDN, #[phone]

## 2019-09-27 NOTE — PROGRESS NOTE ADULT - ASSESSMENT
73 yo Female with PMH of asthma, A fib, dm, HFrEF, pacemaker/AICD, perforated diverticulitis s/p L hemicolectomy and end colostomy w/ multiple washouts and abdominal wall closure w/ strattice complicated by persistent intraperitoneal abscess 2/2 rectal stump leak now POD 4 s/p split thickness skin graft on 9/23 with Dr. Perez, recovering well.    Plan:  - Abdominal wound vac until 9/30, plastics will remove  - ABD in place on R thigh, please reinforce if necessary but no need to take down  - rest of care per primary team

## 2019-09-28 RX ADMIN — MONTELUKAST 10 MILLIGRAM(S): 4 TABLET, CHEWABLE ORAL at 13:06

## 2019-09-28 RX ADMIN — Medication 5 MILLIGRAM(S): at 05:44

## 2019-09-28 RX ADMIN — ATORVASTATIN CALCIUM 20 MILLIGRAM(S): 80 TABLET, FILM COATED ORAL at 21:19

## 2019-09-28 RX ADMIN — APIXABAN 5 MILLIGRAM(S): 2.5 TABLET, FILM COATED ORAL at 05:44

## 2019-09-28 RX ADMIN — Medication 40 MILLIGRAM(S): at 05:44

## 2019-09-28 RX ADMIN — MEROPENEM 100 MILLIGRAM(S): 1 INJECTION INTRAVENOUS at 13:20

## 2019-09-28 RX ADMIN — SPIRONOLACTONE 25 MILLIGRAM(S): 25 TABLET, FILM COATED ORAL at 05:43

## 2019-09-28 RX ADMIN — FAMOTIDINE 20 MILLIGRAM(S): 10 INJECTION INTRAVENOUS at 13:07

## 2019-09-28 RX ADMIN — MEROPENEM 100 MILLIGRAM(S): 1 INJECTION INTRAVENOUS at 21:19

## 2019-09-28 RX ADMIN — MAGNESIUM OXIDE 400 MG ORAL TABLET 400 MILLIGRAM(S): 241.3 TABLET ORAL at 18:14

## 2019-09-28 RX ADMIN — APIXABAN 5 MILLIGRAM(S): 2.5 TABLET, FILM COATED ORAL at 18:14

## 2019-09-28 RX ADMIN — MEROPENEM 100 MILLIGRAM(S): 1 INJECTION INTRAVENOUS at 05:43

## 2019-09-28 RX ADMIN — MAGNESIUM OXIDE 400 MG ORAL TABLET 400 MILLIGRAM(S): 241.3 TABLET ORAL at 08:41

## 2019-09-28 RX ADMIN — CHLORHEXIDINE GLUCONATE 1 APPLICATION(S): 213 SOLUTION TOPICAL at 13:07

## 2019-09-28 RX ADMIN — MAGNESIUM OXIDE 400 MG ORAL TABLET 400 MILLIGRAM(S): 241.3 TABLET ORAL at 13:06

## 2019-09-28 RX ADMIN — TIOTROPIUM BROMIDE 1 CAPSULE(S): 18 CAPSULE ORAL; RESPIRATORY (INHALATION) at 13:06

## 2019-09-28 NOTE — PROGRESS NOTE ADULT - ATTENDING COMMENTS
Surgery Attending    Pt seen and examined; agree with above assessment and plan    VAC dressing change Monday by Plastics

## 2019-09-28 NOTE — PROGRESS NOTE ADULT - ASSESSMENT
73 yo Female with PMH of asthma, A fib, dm, HFrEF, pacemaker/AICD, perforated diverticulitis s/p L hemicolectomy and end colostomy w/ multiple washouts and abdominal wall closure w/ strattice (last surgery on 7/11), presents from [facility] due to possible dislodgement of IR drain and increased purulent output from her abdomen. CT scan showing increase in size of L paracolic gutter fluid collection, however drains appear in place. POD 8 s/p IR upsizing, though continued low drain output. Wound vac placed over abdominal wound (9/16/2019). S/p split thickness skin graft on 9/23 with Dr. Perez. Continues skin graft with wound vac to suction and draining fistula to pouch.     - Diet: soft diet   - Keep abdominal wound vac in place for 7 days (9/30) will be removed by plastics   - If right thigh skin graft donor site dressing has drainage, may reinforce. Please do not remove dressing.  - Per ID: continue Meropenem 1g IV Q8H  - Appreciate Heme/Onc Consult: Iron studies are c/w anemia of chronic inflammation. Given Procrit 40,000U x1 and use pediatric vials to obtain lab work. Will include follow-up with Hematology after discharge for further management.  - dispo planning - return to [facility] if possible     [facility], [id number]

## 2019-09-28 NOTE — PROGRESS NOTE ADULT - SUBJECTIVE AND OBJECTIVE BOX
Surgery Progress Note    SUBJECTIVE/ROS: Patient examined at bed side. No acute events overnight.   Denies nausea, vomiting, chest pain, shortness of breath      MEDICATIONS  (STANDING):  apixaban 5 milliGRAM(s) Oral every 12 hours  atorvastatin 20 milliGRAM(s) Oral at bedtime  chlorhexidine 2% Cloths 1 Application(s) Topical daily  chlorhexidine 4% Liquid 1 Application(s) Topical <User Schedule>  dextrose 5%. 1000 milliLiter(s) (50 mL/Hr) IV Continuous <Continuous>  dextrose 50% Injectable 12.5 Gram(s) IV Push once  dextrose 50% Injectable 25 Gram(s) IV Push once  dextrose 50% Injectable 25 Gram(s) IV Push once  enalapril 5 milliGRAM(s) Oral daily  famotidine    Tablet 20 milliGRAM(s) Oral daily  furosemide    Tablet 40 milliGRAM(s) Oral daily  insulin lispro (HumaLOG) corrective regimen sliding scale   SubCutaneous Before meals and at bedtime  insulin lispro (HumaLOG) corrective regimen sliding scale   SubCutaneous at bedtime  magnesium oxide 400 milliGRAM(s) Oral three times a day with meals  meropenem  IVPB 1000 milliGRAM(s) IV Intermittent every 8 hours  montelukast 10 milliGRAM(s) Oral daily  nystatin Powder 1 Application(s) Topical two times a day  spironolactone 25 milliGRAM(s) Oral daily  tiotropium 18 MICROgram(s) Capsule 1 Capsule(s) Inhalation daily    MEDICATIONS  (PRN):  ALBUTerol    0.083% 2.5 milliGRAM(s) Nebulizer every 6 hours PRN Shortness of Breath and/or Wheezing  dextrose 40% Gel 15 Gram(s) Oral once PRN Blood Glucose LESS THAN 70 milliGRAM(s)/deciliter  glucagon  Injectable 1 milliGRAM(s) IntraMuscular once PRN Glucose LESS THAN 70 milligrams/deciliter  sodium chloride 0.9% lock flush 10 milliLiter(s) IV Push every 1 hour PRN Pre/post blood products, medications, blood draw, and to maintain line patency        Objective:  Vital Signs Last 24 Hrs  T(C): 36.8 (28 Sep 2019 00:36), Max: 37 (27 Sep 2019 09:10)  T(F): 98.2 (28 Sep 2019 00:36), Max: 98.6 (27 Sep 2019 09:10)  HR: 97 (28 Sep 2019 00:36) (90 - 99)  BP: 108/68 (28 Sep 2019 00:36) (101/69 - 119/73)  BP(mean): --  RR: 18 (28 Sep 2019 00:36) (18 - 18)  SpO2: 95% (28 Sep 2019 00:36) (95% - 99%)    Physical Exam:  Constitutional: NAD, resting comfortable in bed   Respiratory: No increased WOB, on RA  Gastrointestinal: soft, compressible, non-distended, mid abdominal wound vac in place with appropriate suction, draining pouch inferior to wound vac with purulent drainage. Left sided IR drain in place with minimal purulent drainage. Right sided ostomy is pink with stool in bag.   Genitourinary: Primafit in place   Extremities: all four extremities moving spontaneously       09-26 @ 07:01  -  09-27 @ 07:00  --------------------------------------------------------  IN: 570 mL / OUT: 1770 mL / NET: -1200 mL    09-27 @ 07:01 - 09-28 @ 05:14  --------------------------------------------------------  IN: 510 mL / OUT: 800 mL / NET: -290 mL        I&O's Detail    26 Sep 2019 07:01  -  27 Sep 2019 07:00  --------------------------------------------------------  IN:    Oral Fluid: 420 mL    Solution: 150 mL  Total IN: 570 mL    OUT:    Colostomy: 70 mL    Drain: 50 mL    Other: 25 mL    Voided: 1625 mL  Total OUT: 1770 mL    Total NET: -1200 mL      27 Sep 2019 07:01  -  28 Sep 2019 05:14  --------------------------------------------------------  IN:    Oral Fluid: 460 mL    Solution: 50 mL  Total IN: 510 mL    OUT:    Colostomy: 450 mL    Voided: 350 mL  Total OUT: 800 mL    Total NET: -290 mL          LABS:                        7.3    7.55  )-----------( 188      ( 25 Sep 2019 10:00 )             24.1

## 2019-09-29 LAB
ANION GAP SERPL CALC-SCNC: 12 MMOL/L — SIGNIFICANT CHANGE UP (ref 5–17)
BUN SERPL-MCNC: 9 MG/DL — SIGNIFICANT CHANGE UP (ref 7–23)
CALCIUM SERPL-MCNC: 7.4 MG/DL — LOW (ref 8.4–10.5)
CHLORIDE SERPL-SCNC: 104 MMOL/L — SIGNIFICANT CHANGE UP (ref 96–108)
CO2 SERPL-SCNC: 23 MMOL/L — SIGNIFICANT CHANGE UP (ref 22–31)
CREAT SERPL-MCNC: 0.4 MG/DL — LOW (ref 0.5–1.3)
GLUCOSE SERPL-MCNC: 89 MG/DL — SIGNIFICANT CHANGE UP (ref 70–99)
HCT VFR BLD CALC: 26.6 % — LOW (ref 34.5–45)
HGB BLD-MCNC: 8 G/DL — LOW (ref 11.5–15.5)
MAGNESIUM SERPL-MCNC: 1.5 MG/DL — LOW (ref 1.6–2.6)
MCHC RBC-ENTMCNC: 29.7 PG — SIGNIFICANT CHANGE UP (ref 27–34)
MCHC RBC-ENTMCNC: 30.2 GM/DL — LOW (ref 32–36)
MCV RBC AUTO: 98.4 FL — SIGNIFICANT CHANGE UP (ref 80–100)
PHOSPHATE SERPL-MCNC: 1.5 MG/DL — LOW (ref 2.5–4.5)
PLATELET # BLD AUTO: 201 K/UL — SIGNIFICANT CHANGE UP (ref 150–400)
POTASSIUM SERPL-MCNC: 3.6 MMOL/L — SIGNIFICANT CHANGE UP (ref 3.5–5.3)
POTASSIUM SERPL-SCNC: 3.6 MMOL/L — SIGNIFICANT CHANGE UP (ref 3.5–5.3)
RBC # BLD: 2.7 M/UL — LOW (ref 3.8–5.2)
RBC # FLD: 19.7 % — HIGH (ref 10.3–14.5)
SODIUM SERPL-SCNC: 139 MMOL/L — SIGNIFICANT CHANGE UP (ref 135–145)
WBC # BLD: 9.2 K/UL — SIGNIFICANT CHANGE UP (ref 3.8–10.5)
WBC # FLD AUTO: 9.2 K/UL — SIGNIFICANT CHANGE UP (ref 3.8–10.5)

## 2019-09-29 RX ORDER — MAGNESIUM SULFATE 500 MG/ML
2 VIAL (ML) INJECTION ONCE
Refills: 0 | Status: COMPLETED | OUTPATIENT
Start: 2019-09-29 | End: 2019-09-29

## 2019-09-29 RX ORDER — SODIUM,POTASSIUM PHOSPHATES 278-250MG
1 POWDER IN PACKET (EA) ORAL ONCE
Refills: 0 | Status: COMPLETED | OUTPATIENT
Start: 2019-09-29 | End: 2019-09-29

## 2019-09-29 RX ORDER — POTASSIUM PHOSPHATE, MONOBASIC POTASSIUM PHOSPHATE, DIBASIC 236; 224 MG/ML; MG/ML
30 INJECTION, SOLUTION INTRAVENOUS ONCE
Refills: 0 | Status: COMPLETED | OUTPATIENT
Start: 2019-09-29 | End: 2019-09-29

## 2019-09-29 RX ADMIN — POTASSIUM PHOSPHATE, MONOBASIC POTASSIUM PHOSPHATE, DIBASIC 83.33 MILLIMOLE(S): 236; 224 INJECTION, SOLUTION INTRAVENOUS at 15:59

## 2019-09-29 RX ADMIN — MONTELUKAST 10 MILLIGRAM(S): 4 TABLET, CHEWABLE ORAL at 13:48

## 2019-09-29 RX ADMIN — SPIRONOLACTONE 25 MILLIGRAM(S): 25 TABLET, FILM COATED ORAL at 06:26

## 2019-09-29 RX ADMIN — MEROPENEM 100 MILLIGRAM(S): 1 INJECTION INTRAVENOUS at 21:39

## 2019-09-29 RX ADMIN — NYSTATIN CREAM 1 APPLICATION(S): 100000 CREAM TOPICAL at 18:10

## 2019-09-29 RX ADMIN — Medication 1 PACKET(S): at 13:46

## 2019-09-29 RX ADMIN — TIOTROPIUM BROMIDE 1 CAPSULE(S): 18 CAPSULE ORAL; RESPIRATORY (INHALATION) at 13:48

## 2019-09-29 RX ADMIN — ATORVASTATIN CALCIUM 20 MILLIGRAM(S): 80 TABLET, FILM COATED ORAL at 21:39

## 2019-09-29 RX ADMIN — ALBUTEROL 2.5 MILLIGRAM(S): 90 AEROSOL, METERED ORAL at 05:39

## 2019-09-29 RX ADMIN — APIXABAN 5 MILLIGRAM(S): 2.5 TABLET, FILM COATED ORAL at 18:09

## 2019-09-29 RX ADMIN — SODIUM CHLORIDE 10 MILLILITER(S): 9 INJECTION INTRAMUSCULAR; INTRAVENOUS; SUBCUTANEOUS at 05:55

## 2019-09-29 RX ADMIN — MAGNESIUM OXIDE 400 MG ORAL TABLET 400 MILLIGRAM(S): 241.3 TABLET ORAL at 18:09

## 2019-09-29 RX ADMIN — CHLORHEXIDINE GLUCONATE 1 APPLICATION(S): 213 SOLUTION TOPICAL at 13:44

## 2019-09-29 RX ADMIN — Medication 50 GRAM(S): at 13:45

## 2019-09-29 RX ADMIN — Medication 1: at 14:30

## 2019-09-29 RX ADMIN — FAMOTIDINE 20 MILLIGRAM(S): 10 INJECTION INTRAVENOUS at 13:47

## 2019-09-29 RX ADMIN — Medication 5 MILLIGRAM(S): at 06:26

## 2019-09-29 RX ADMIN — APIXABAN 5 MILLIGRAM(S): 2.5 TABLET, FILM COATED ORAL at 05:38

## 2019-09-29 RX ADMIN — Medication 1: at 19:51

## 2019-09-29 RX ADMIN — MAGNESIUM OXIDE 400 MG ORAL TABLET 400 MILLIGRAM(S): 241.3 TABLET ORAL at 08:37

## 2019-09-29 RX ADMIN — MEROPENEM 100 MILLIGRAM(S): 1 INJECTION INTRAVENOUS at 13:45

## 2019-09-29 RX ADMIN — Medication 40 MILLIGRAM(S): at 05:38

## 2019-09-29 RX ADMIN — MEROPENEM 100 MILLIGRAM(S): 1 INJECTION INTRAVENOUS at 05:38

## 2019-09-29 RX ADMIN — MAGNESIUM OXIDE 400 MG ORAL TABLET 400 MILLIGRAM(S): 241.3 TABLET ORAL at 13:46

## 2019-09-29 NOTE — PROGRESS NOTE ADULT - ATTENDING COMMENTS
I have seen and examined the patient.  I agree with the surgical resident's note.  We have irrigated the tube at bedside and done a rectal - rectum is clear of stool   Ariel Chambers contact information [phone]

## 2019-09-29 NOTE — PROGRESS NOTE ADULT - ASSESSMENT
75 yo Female with PMH of asthma, A fib, dm, HFrEF, pacemaker/AICD, perforated diverticulitis s/p L hemicolectomy and end colostomy w/ multiple washouts and abdominal wall closure w/ strattice (last surgery on 7/11), presents from [facility] due to possible dislodgement of IR drain and increased purulent output from her abdomen. CT scan showing increase in size of L paracolic gutter fluid collection, however drains appear in place. POD 8 s/p IR upsizing, though continued low drain output. Wound vac placed over abdominal wound (9/16/2019). S/p split thickness skin graft on 9/23 with Dr. Perez. Continues skin graft with wound vac to suction and draining fistula to pouch.     - Diet: soft diet   - Keep abdominal wound vac in place for 7 days (9/30) will be removed by plastics tomorrow  - Keep right thigh skin graft in place   - Per ID: continue Meropenem 1g IV Q8H  - Appreciate Heme/Onc Consult: Iron studies are c/w anemia of chronic inflammation. Given Procrit 40,000U x1 and use pediatric vials to obtain lab work. Will include follow-up with Hematology after discharge for further management.  - dispo planning - return to [facility] if possible   - Labs today- monitor white count, electrolytes     [facility], [id number]

## 2019-09-29 NOTE — PROGRESS NOTE ADULT - SUBJECTIVE AND OBJECTIVE BOX
Surgery Progress Note    SUBJECTIVE/ROS: Patient examined at bed side. No acute events overnight.   Denies nausea, vomiting, chest pain, shortness of breath  Continues with 5-10cc pouch and drain output      MEDICATIONS  (STANDING):  apixaban 5 milliGRAM(s) Oral every 12 hours  atorvastatin 20 milliGRAM(s) Oral at bedtime  chlorhexidine 2% Cloths 1 Application(s) Topical daily  chlorhexidine 4% Liquid 1 Application(s) Topical <User Schedule>  dextrose 5%. 1000 milliLiter(s) (50 mL/Hr) IV Continuous <Continuous>  dextrose 50% Injectable 12.5 Gram(s) IV Push once  dextrose 50% Injectable 25 Gram(s) IV Push once  dextrose 50% Injectable 25 Gram(s) IV Push once  enalapril 5 milliGRAM(s) Oral daily  famotidine    Tablet 20 milliGRAM(s) Oral daily  furosemide    Tablet 40 milliGRAM(s) Oral daily  insulin lispro (HumaLOG) corrective regimen sliding scale   SubCutaneous Before meals and at bedtime  insulin lispro (HumaLOG) corrective regimen sliding scale   SubCutaneous at bedtime  magnesium oxide 400 milliGRAM(s) Oral three times a day with meals  meropenem  IVPB 1000 milliGRAM(s) IV Intermittent every 8 hours  montelukast 10 milliGRAM(s) Oral daily  nystatin Powder 1 Application(s) Topical two times a day  spironolactone 25 milliGRAM(s) Oral daily  tiotropium 18 MICROgram(s) Capsule 1 Capsule(s) Inhalation daily    MEDICATIONS  (PRN):  ALBUTerol    0.083% 2.5 milliGRAM(s) Nebulizer every 6 hours PRN Shortness of Breath and/or Wheezing  dextrose 40% Gel 15 Gram(s) Oral once PRN Blood Glucose LESS THAN 70 milliGRAM(s)/deciliter  glucagon  Injectable 1 milliGRAM(s) IntraMuscular once PRN Glucose LESS THAN 70 milligrams/deciliter  sodium chloride 0.9% lock flush 10 milliLiter(s) IV Push every 1 hour PRN Pre/post blood products, medications, blood draw, and to maintain line patency        Objective:  Vital Signs Last 24 Hrs  T(C): 36.7 (29 Sep 2019 01:01), Max: 37 (28 Sep 2019 10:00)  T(F): 98 (29 Sep 2019 01:01), Max: 98.6 (28 Sep 2019 10:00)  HR: 108 (29 Sep 2019 01:01) (91 - 108)  BP: 99/65 (29 Sep 2019 01:01) (94/61 - 118/61)  BP(mean): --  RR: 18 (29 Sep 2019 01:01) (18 - 18)  SpO2: 98% (29 Sep 2019 01:01) (97% - 100%)    Physical Exam:  Constitutional: NAD, resting comfortable in bed   Respiratory: No increased WOB, on RA  Gastrointestinal: soft, compressible, non-distended, mid abdominal wound vac in place with appropriate suction, draining pouch inferior to wound vac with purulent drainage. Left sided IR drain in place with minimal purulent drainage. Right sided ostomy is pink with stool in bag.   Genitourinary: Primafit in place   Extremities: all four extremities moving spontaneously       09-27 @ 07:01  -  09-28 @ 07:00  --------------------------------------------------------  IN: 610 mL / OUT: 1110 mL / NET: -500 mL    09-28 @ 07:01  -  09-29 @ 05:05  --------------------------------------------------------  IN: 350 mL / OUT: 880 mL / NET: -530 mL          LABS:

## 2019-09-30 LAB — PHOSPHATE SERPL-MCNC: 1.8 MG/DL — LOW (ref 2.5–4.5)

## 2019-09-30 PROCEDURE — 99232 SBSQ HOSP IP/OBS MODERATE 35: CPT

## 2019-09-30 PROCEDURE — 99024 POSTOP FOLLOW-UP VISIT: CPT

## 2019-09-30 RX ORDER — SODIUM,POTASSIUM PHOSPHATES 278-250MG
1 POWDER IN PACKET (EA) ORAL ONCE
Refills: 0 | Status: COMPLETED | OUTPATIENT
Start: 2019-09-30 | End: 2019-09-30

## 2019-09-30 RX ORDER — ERTAPENEM SODIUM 1 G/1
1000 INJECTION, POWDER, LYOPHILIZED, FOR SOLUTION INTRAMUSCULAR; INTRAVENOUS EVERY 24 HOURS
Refills: 0 | Status: DISCONTINUED | OUTPATIENT
Start: 2019-09-30 | End: 2019-10-04

## 2019-09-30 RX ADMIN — MAGNESIUM OXIDE 400 MG ORAL TABLET 400 MILLIGRAM(S): 241.3 TABLET ORAL at 13:34

## 2019-09-30 RX ADMIN — CHLORHEXIDINE GLUCONATE 1 APPLICATION(S): 213 SOLUTION TOPICAL at 13:43

## 2019-09-30 RX ADMIN — MEROPENEM 100 MILLIGRAM(S): 1 INJECTION INTRAVENOUS at 05:41

## 2019-09-30 RX ADMIN — MONTELUKAST 10 MILLIGRAM(S): 4 TABLET, CHEWABLE ORAL at 13:34

## 2019-09-30 RX ADMIN — FAMOTIDINE 20 MILLIGRAM(S): 10 INJECTION INTRAVENOUS at 13:37

## 2019-09-30 RX ADMIN — MAGNESIUM OXIDE 400 MG ORAL TABLET 400 MILLIGRAM(S): 241.3 TABLET ORAL at 17:09

## 2019-09-30 RX ADMIN — NYSTATIN CREAM 1 APPLICATION(S): 100000 CREAM TOPICAL at 17:12

## 2019-09-30 RX ADMIN — CHLORHEXIDINE GLUCONATE 1 APPLICATION(S): 213 SOLUTION TOPICAL at 13:44

## 2019-09-30 RX ADMIN — MEROPENEM 100 MILLIGRAM(S): 1 INJECTION INTRAVENOUS at 13:43

## 2019-09-30 RX ADMIN — Medication 40 MILLIGRAM(S): at 05:41

## 2019-09-30 RX ADMIN — APIXABAN 5 MILLIGRAM(S): 2.5 TABLET, FILM COATED ORAL at 17:10

## 2019-09-30 RX ADMIN — TIOTROPIUM BROMIDE 1 CAPSULE(S): 18 CAPSULE ORAL; RESPIRATORY (INHALATION) at 13:34

## 2019-09-30 RX ADMIN — ATORVASTATIN CALCIUM 20 MILLIGRAM(S): 80 TABLET, FILM COATED ORAL at 21:35

## 2019-09-30 RX ADMIN — MAGNESIUM OXIDE 400 MG ORAL TABLET 400 MILLIGRAM(S): 241.3 TABLET ORAL at 11:03

## 2019-09-30 RX ADMIN — APIXABAN 5 MILLIGRAM(S): 2.5 TABLET, FILM COATED ORAL at 05:41

## 2019-09-30 RX ADMIN — ERTAPENEM SODIUM 120 MILLIGRAM(S): 1 INJECTION, POWDER, LYOPHILIZED, FOR SOLUTION INTRAMUSCULAR; INTRAVENOUS at 21:35

## 2019-09-30 RX ADMIN — Medication 1: at 15:00

## 2019-09-30 RX ADMIN — Medication 85 MILLIMOLE(S): at 11:02

## 2019-09-30 RX ADMIN — Medication 1 PACKET(S): at 21:35

## 2019-09-30 NOTE — PROGRESS NOTE ADULT - ASSESSMENT
75 yo Female with PMH of asthma, A fib, dm, HFrEF, pacemaker/AICD, perforated diverticulitis s/p L hemicolectomy and end colostomy w/ multiple washouts and abdominal wall closure w/ strattice complicated by persistent intraperitoneal abscess 2/2 rectal stump leak now POD 7 s/p split thickness skin graft on 9/23 with Dr. Perez, recovering well.    Plan:  - Abdominal wound vac will be taken down today, will coordinate with wound care and PT for management of fistula pouch as well.   - ABD in place on R thigh, we will change this at the same time as the Vac  - rest of care per primary team

## 2019-09-30 NOTE — PROGRESS NOTE ADULT - SUBJECTIVE AND OBJECTIVE BOX
Follow Up:  Intraabdominal Abscess    Interval History: No chills or fevers. No N/V.     REVIEW OF SYSTEMS  [  ] ROS unobtainable because:    [ x ] All other systems negative except as noted below    Constitutional:  [ ] fever [ ] chills  [ ] weight loss  [ ] weakness  Skin:  [ ] rash [ ] phlebitis	  Eyes: [ ] icterus [ ] pain  [ ] discharge	  ENMT: [ ] sore throat  [ ] thrush [ ] ulcers [ ] exudates  Respiratory: [ ] dyspnea [ ] hemoptysis [ ] cough [ ] sputum	  Cardiovascular:  [ ] chest pain [ ] palpitations [ ] edema	  Gastrointestinal:  [ ] nausea [ ] vomiting [ ] diarrhea [ ] constipation [x ] pain	  Genitourinary:  [ ] dysuria [ ] frequency [ ] hematuria [ ] discharge [ ] flank pain  [ ] incontinence  Musculoskeletal:  [ ] myalgias [ ] arthralgias [ ] arthritis  [ ] back pain  Neurological:  [ ] headache [ ] seizures  [ ] confusion/altered mental status      Allergies  Coreg (Other)  digoxin (Other; Short breath (Mild to Mod))  IV Contrast (Unknown)  penicillins (Hives)        ANTIMICROBIALS:  meropenem  IVPB 1000 every 8 hours      OTHER MEDS:  MEDICATIONS  (STANDING):  ALBUTerol    0.083% 2.5 every 6 hours PRN  apixaban 5 every 12 hours  atorvastatin 20 at bedtime  dextrose 40% Gel 15 once PRN  dextrose 50% Injectable 12.5 once  dextrose 50% Injectable 25 once  dextrose 50% Injectable 25 once  enalapril 5 daily  famotidine    Tablet 20 daily  furosemide    Tablet 40 daily  glucagon  Injectable 1 once PRN  insulin lispro (HumaLOG) corrective regimen sliding scale  Before meals and at bedtime  insulin lispro (HumaLOG) corrective regimen sliding scale  at bedtime  montelukast 10 daily  spironolactone 25 daily  tiotropium 18 MICROgram(s) Capsule 1 daily      Vital Signs Last 24 Hrs  T(C): 36.9 (30 Sep 2019 16:08), Max: 36.9 (30 Sep 2019 16:08)  T(F): 98.5 (30 Sep 2019 16:08), Max: 98.5 (30 Sep 2019 16:08)  HR: 91 (30 Sep 2019 16:08) (89 - 100)  BP: 95/61 (30 Sep 2019 16:08) (91/62 - 103/73)  BP(mean): --  RR: 18 (30 Sep 2019 16:08) (18 - 18)  SpO2: 100% (30 Sep 2019 16:08) (98% - 100%)    PHYSICAL EXAMINATION:  General: Alert and Awake, NAD  HEENT: PERRL, EOMI  Neck: Supple  Cardiac: RRR, No M/R/G  Resp: CTAB, No Wh/Rh/Ra  Abdomen: +RLQ Ostomy, Midline Wound Vac with minimal output, LLQ drain with purulent drainage, NBS, No tenderness to palpation.   MSK: Trace to 1+ LE edema. Moderate Calf tenderness  Skin: No rashes or lesions. Skin is warm and dry to the touch.   Neuro: Alert and Awake. CN 2-12 Grossly intact. Moves all four extremities spontaneously.  Psych: Calm, Pleasant, Cooperative  Vascular: RUE PICC Line (No surrounding erythema, drainage or tenderness to palpation)                          8.0    9.20  )-----------( 201      ( 29 Sep 2019 08:42 )             26.6       09-29    139  |  104  |  9   ----------------------------<  89  3.6   |  23  |  0.40<L>    Ca    7.4<L>      29 Sep 2019 07:28  Phos  1.8     09-30  Mg     1.5     09-29            MICROBIOLOGY:  v  .Blood  09-16-19   No growth at 5 days.  --  --      .Blood  09-15-19   No growth at 5 days.  --  --      .Abscess  09-13-19   Numerous Escherichia coli ESBL  Numerous Enterococcus faecalis  --  Escherichia coli ESBL  Enterococcus faecalis      .Blood  09-12-19   Growth in aerobic bottle: Staphylococcus epidermidis  --  Coag Negative Staphylococcus      BLOOD PERIPHERAL  09-03-19 --  --  --                RADIOLOGY:    No new imaging for review at time of assessment.

## 2019-09-30 NOTE — PROGRESS NOTE ADULT - ASSESSMENT
75 yo Female with PMH of asthma, A fib, dm, HFrEF, pacemaker/AICD, perforated diverticulitis s/p L hemicolectomy and end colostomy w/ multiple washouts and abdominal wall closure w/ strattice (last surgery on 7/11), presents from [place] due to possible dislodgement of IR drain and increased purulent output from her abdomen. CT scan showing increase in size of L paracolic gutter fluid collection, however drains appear in place. POD 8 s/p IR upsizing, though continued low drain output. Wound vac placed over abdominal wound (9/16/2019). S/p split thickness skin graft on 9/23 with Dr. Perez. Continues skin graft with wound vac to suction and draining fistula to pouch.     - remove vac dressing over skin graft today (9/30); f/up plastics recs  - Diet: soft diet   - Keep right thigh skin graft in place; f/up plastics recs  - Per ID: continue Meropenem 1g IV Q8H  - physical therapy today- encourage patient to get out of bed   - Appreciate Heme/Onc Consult: Iron studies are c/w anemia of chronic inflammation. Given Procrit 40,000U x1 and use pediatric vials to obtain lab work. Will include follow-up with Hematology after discharge for further management.  - dispo planning - return to [place] if possible   - Repeat phos only today     [facility], [id number]

## 2019-09-30 NOTE — PROGRESS NOTE ADULT - SUBJECTIVE AND OBJECTIVE BOX
Surgery Progress Note    SUBJECTIVE/ROS: Patient examined at bed side. No acute events overnight.   Denies nausea, vomiting, chest pain, shortness of breath  Yesterday, drain flushed and aspirated on rounds- 20cc of purulent fluid returned; drain is patent       ALBUTerol    0.083% 2.5 milliGRAM(s) every 6 hours PRN  apixaban 5 milliGRAM(s) every 12 hours  atorvastatin 20 milliGRAM(s) at bedtime  chlorhexidine 2% Cloths 1 Application(s) daily  chlorhexidine 4% Liquid 1 Application(s) <User Schedule>  dextrose 40% Gel 15 Gram(s) once PRN  dextrose 5%. 1000 milliLiter(s) <Continuous>  dextrose 50% Injectable 12.5 Gram(s) once  dextrose 50% Injectable 25 Gram(s) once  dextrose 50% Injectable 25 Gram(s) once  enalapril 5 milliGRAM(s) daily  famotidine    Tablet 20 milliGRAM(s) daily  furosemide    Tablet 40 milliGRAM(s) daily  glucagon  Injectable 1 milliGRAM(s) once PRN  insulin lispro (HumaLOG) corrective regimen sliding scale   Before meals and at bedtime  insulin lispro (HumaLOG) corrective regimen sliding scale   at bedtime  magnesium oxide 400 milliGRAM(s) three times a day with meals  meropenem  IVPB 1000 milliGRAM(s) every 8 hours  montelukast 10 milliGRAM(s) daily  nystatin Powder 1 Application(s) two times a day  sodium chloride 0.9% lock flush 10 milliLiter(s) every 1 hour PRN  spironolactone 25 milliGRAM(s) daily  tiotropium 18 MICROgram(s) Capsule 1 Capsule(s) daily      Objective:  Vital Signs Last 24 Hrs  T(C): 36.7 (30 Sep 2019 01:46), Max: 36.8 (29 Sep 2019 09:15)  T(F): 98 (30 Sep 2019 01:46), Max: 98.3 (29 Sep 2019 09:15)  HR: 90 (30 Sep 2019 01:46) (89 - 107)  BP: 95/63 (30 Sep 2019 01:46) (91/62 - 125/73)  BP(mean): --  RR: 18 (30 Sep 2019 01:46) (18 - 18)  SpO2: 98% (30 Sep 2019 01:46) (97% - 100%)    Physical Exam:  Constitutional: NAD, resting comfortable in bed   Respiratory: No increased WOB, on RA  Gastrointestinal: soft, compressible, non-distended, mid abdominal wound vac in place with appropriate suction, draining pouch inferior to wound vac with purulent drainage. Left side IR drain with purulent output, aspirated. Right sided ostomy is pink with stool in bag.   Genitourinary: Primafit in place   Extremities: all four extremities moving spontaneously         09-28 @ 07:01  -  09-29 @ 07:00  --------------------------------------------------------  IN: 450 mL / OUT: 1100 mL / NET: -650 mL    09-29 @ 07:01  - 09-30 @ 04:09  --------------------------------------------------------  IN: 460 mL / OUT: 572 mL / NET: -112 mL        LABS:                              8.0    9.20  )-----------( 201      ( 29 Sep 2019 08:42 )             26.6     09-29    139  |  104  |  9   ----------------------------<  89  3.6   |  23  |  0.40<L>    Ca    7.4<L>      29 Sep 2019 07:28  Phos  1.5     09-29  Mg     1.5     09-29

## 2019-09-30 NOTE — PROGRESS NOTE ADULT - ASSESSMENT
74 year old female PMH of HFrEF, s/p AICD, asthma, HTN, HLD, atrial fibrillation on eliquis, with previous perforated diverticulosis, Hartmans procedure with multiple RTOR for abd washouts. Admitted 6/30 - 8/2 for feculent peritonitis with septic shock secondary to perforated diverticulitis     Repeat CT A/P 8/19 with extraluminal contrast consistent with dehiscence of the sigmoid colon stump. IR Drain Cultures with E. coli, AHS and Bacteroides. CT with CT A/P (9/12)  with Left hemicolectomy with right lower quadrant colostomy and ventral abdominal wall dehiscence. Slight interval enlargement of the left paracolic gutter fluid collection measuring up to 3 x 3.9 x 10.2 cm (TR x AP x CC) s/p IR upsize of drain 9/13/19.  Repeat bacterial cultures from drainage with ESBL E coli and enterococcus S amp    Blood cultures with 2/4 bottles CoNS - likely contamination; repeat BCX NGTD    On meropenem, unclear that enterococcus needs to be treated for polymicrobial with predominant organism likely E coli    Repeat CT A/P (9/20) with continued extravasation of contrast to flank collection and skin (continued rectal stump leak) - prognosis is poor if no surgical intervention possible on stump leak.     s/p Skin Graft to Abdomen 9/23    Would switch to Ertapenem. Would continue on discharge. Duration depending on repeat CT A/P imaging. Would obtain imaging 4 weeks from 9/20.     Overall, Intraabdominal Abscess, Hypotension, Rectal Stump Leak, CoNS Bacteremia    --Stop Meropenem  --Start Ertapenem 1g IV Q24H  --Recommend CBC with Diff and CMP qWeekly while on antimicrobials. Please have results faxed to [phone]  --Patient was advised to follow up with me in the Infectious Diseases Office in 2 weeks time. Patient was provided with my office contact number of [phone]  --Continue to follow temperature curve    I will sign off at this time. Please feel free to contact me with any further questions or concerns.    Rex Goode M.D.  [facility] Division of Infectious Disease  8AM-5PM: Pager Number [phone]  After Hours (or if no response): Please contact the Infectious Diseases Office at [phone]

## 2019-09-30 NOTE — PROGRESS NOTE ADULT - ATTENDING COMMENTS
I have seen and evaluated the patient and discussed the relevant clinical findings and plan with the surgical housestaff and fellow.  Agree with the above documentation with addenda as noted.     Clinically stable  Improved output from L abdominal drain after flushing the tube  Wound management as per plastics    Discharge planning to rehab    Clark Alvarado MD

## 2019-09-30 NOTE — PROGRESS NOTE ADULT - SUBJECTIVE AND OBJECTIVE BOX
no events  vac in place  removed  STSG nicely adherent  donor clean  xeroform every other day to stsg  keep xeroform on donor, change abd daily  ok to rehab  follow up 1 wk from dc

## 2019-09-30 NOTE — PROGRESS NOTE ADULT - SUBJECTIVE AND OBJECTIVE BOX
POD #: 7 s/p Split-thickness skin graft to trunk      SUBJECTIVE:  Reports no abdominal or thigh pain, no seeping or leakage from the dressing or the vac. Patient had no events overnight and is comfortable this AM.    OBJECTIVE:  Vital Signs Last 24 Hrs  T(C): 36.7 (30 Sep 2019 05:35), Max: 36.8 (29 Sep 2019 09:15)  T(F): 98.1 (30 Sep 2019 05:35), Max: 98.3 (29 Sep 2019 09:15)  HR: 97 (30 Sep 2019 05:35) (89 - 101)  BP: 101/67 (30 Sep 2019 05:35) (91/62 - 102/70)  BP(mean): --  RR: 18 (30 Sep 2019 05:35) (18 - 18)  SpO2: 99% (30 Sep 2019 05:35) (97% - 100%)    I&O's Detail    29 Sep 2019 07:01  -  30 Sep 2019 07:00  --------------------------------------------------------  IN:    Oral Fluid: 460 mL    Solution: 100 mL  Total IN: 560 mL    OUT:    Colostomy: 200 mL    Drain: 22 mL    Voided: 650 mL  Total OUT: 872 mL    Total NET: -312 mL          Inpatient Medications:  MEDICATIONS  (STANDING):  apixaban 5 milliGRAM(s) Oral every 12 hours  atorvastatin 20 milliGRAM(s) Oral at bedtime  chlorhexidine 2% Cloths 1 Application(s) Topical daily  chlorhexidine 4% Liquid 1 Application(s) Topical <User Schedule>  dextrose 5%. 1000 milliLiter(s) (50 mL/Hr) IV Continuous <Continuous>  dextrose 50% Injectable 12.5 Gram(s) IV Push once  dextrose 50% Injectable 25 Gram(s) IV Push once  dextrose 50% Injectable 25 Gram(s) IV Push once  enalapril 5 milliGRAM(s) Oral daily  famotidine    Tablet 20 milliGRAM(s) Oral daily  furosemide    Tablet 40 milliGRAM(s) Oral daily  insulin lispro (HumaLOG) corrective regimen sliding scale   SubCutaneous Before meals and at bedtime  insulin lispro (HumaLOG) corrective regimen sliding scale   SubCutaneous at bedtime  magnesium oxide 400 milliGRAM(s) Oral three times a day with meals  meropenem  IVPB 1000 milliGRAM(s) IV Intermittent every 8 hours  montelukast 10 milliGRAM(s) Oral daily  nystatin Powder 1 Application(s) Topical two times a day  spironolactone 25 milliGRAM(s) Oral daily  tiotropium 18 MICROgram(s) Capsule 1 Capsule(s) Inhalation daily    MEDICATIONS  (PRN):  ALBUTerol    0.083% 2.5 milliGRAM(s) Nebulizer every 6 hours PRN Shortness of Breath and/or Wheezing  dextrose 40% Gel 15 Gram(s) Oral once PRN Blood Glucose LESS THAN 70 milliGRAM(s)/deciliter  glucagon  Injectable 1 milliGRAM(s) IntraMuscular once PRN Glucose LESS THAN 70 milligrams/deciliter  sodium chloride 0.9% lock flush 10 milliLiter(s) IV Push every 1 hour PRN Pre/post blood products, medications, blood draw, and to maintain line patency      Physical Examination:  GEN: NAD, resting quietly  NEURO: AAOx3, CN II-XII grossly intact, no focal deficits  PULM: symmetric chest rise bilaterally, no increased WOB  ABD: soft, nontender, nondistended, wound vac in place over graft site holding suction, no output overnight. Fistula pouch in place inferiorly, colostomy in place, viable and functioning, Vac will come down today  EXTR: no cyanosis or edema, moving all extremities. Right thigh ABD CDI, left in place, will change today    LABS:                        8.0    9.20  )-----------( 201      ( 29 Sep 2019 08:42 )             26.6       09-29    139  |  104  |  9   ----------------------------<  89  3.6   |  23  |  0.40<L>    Ca    7.4<L>      29 Sep 2019 07:28  Phos  1.5     09-29  Mg     1.5     09-29        CULTURES:  .Blood  09-16 @ 12:08   No growth at 5 days.  --  --      .Blood  09-15 @ 23:34   No growth at 5 days.  --  --      .Abscess  09-13 @ 22:59   Numerous Escherichia coli ESBL  Numerous Enterococcus faecalis  --  Escherichia coli ESBL  Enterococcus faecalis      .Blood  09-12 @ 22:49   Growth in aerobic bottle: Staphylococcus epidermidis  --  Coag Negative Staphylococcus      BLOOD PERIPHERAL  09-03 @ 07:17 --    NO ORGANISMS ISOLATED  --      .Blood  08-30 @ 18:43   No growth at 5 days.  --  --      .Surgical Swab  08-30 @ 18:42   Few Escherichia coli  Rare Enterococcus faecalis  Rare Candida albicans "Susceptibilities not performed"  --  Escherichia coli  Enterococcus faecalis      .Blood  08-30 @ 18:00   No growth at 5 days.  --  --      .Body Fluid right abdomen wall  08-16 @ 15:21   No growth at 5 days  --    Few polymorphonuclear leukocytes per low power field  No organisms seen per oil power field      .Body Fluid left retroperitoneal  08-16 @ 15:18   Numerous Escherichia coli  Numerous Alpha hemolytic strep "Susceptibilities not performed"  Moderate Bacteroides thetaiotaomicron "Susceptibilities not performed"  Few Enterococcus faecalis  --  Escherichia coli  Enterococcus faecalis      .Blood  08-16 @ 13:08   No growth at 5 days.  --  --      .Blood  08-14 @ 17:44   Growth in aerobic bottle: Coag Negative Staphylococcus  Single set isolate, possible contaminant. Contact  Microbiology if susceptibility testing clinically  indicated.  "Due to technical problems, Proteus sp. will Not be reported as part of  the BCID panel until further notice"  ***Blood Panel PCR results on this specimen are available  approximately 3 hours after the Gram stain result.***  Gram stain, PCR, and/or culture results may not always  correspond due to difference in methodologies.  ************************************************************  This PCR assay was performed using [employer].  The following targets are tested for: Enterococcus,  vancomycin resistant enterococci, Listeria monocytogenes,  coagulase negative staphylococci, S. aureus,  methicillin resistant S. aureus, Streptococcus agalactiae  (Group B), S. pneumoniae, S. pyogenes (Group A),  Acinetobacter baumannii, Enterobacter cloacae, E. coli,  Klebsiella oxytoca, K. pneumoniae, Proteus sp.,  Serratia marcescens, Haemophilus influenzae,  Neisseria meningitidis, Pseudomonas aeruginosa, Candida  albicans, C. glabrata, C krusei, C parapsilosis,  C. tropicalis and the KPC resistance gene.  --  Blood Culture PCR      .Sputum  07-29 @ 04:17   Moderate Pseudomonas aeruginosa (Carbapenem Resistant) #2  Normal Respiratory Liz present  --  Pseudomonas aeruginosa (Carbapenem Resistant)      .Urine  07-24 @ 01:06   No growth  --  --      .Body Fluid  07-23 @ 13:26   Numerous Escherichia coli  Few Coag Negative Staphylococcus  Numerous Escherichia coli #2  --  Escherichia coli  Escherichia coli      .Blood  07-10 @ 20:09   No growth at 5 days.  --  --      .Blood  07-10 @ 20:08   No growth at 5 days.  --  --      .Body Fluid abdominal fluid  07-09 @ 17:54   Few Enterococcus faecalis  Few Coag Negative Staphylococcus  --  Enterococcus faecalis  Coag Negative Staphylococcus      .Body Fluid abdominal fluid  07-09 @ 17:46   Few Enterococcus faecalis  Few Coag Negative Staphylococcus  --  Enterococcus faecalis  Coag Negative Staphylococcus      .Surgical Swab None Specimen Source:  abdomen, esw  07-02 @ 10:15   Moderate Escherichia coli  Moderate Alpha hemolytic strep "Susceptibilities not performed"  Moderate Strep mitis oralis group "Susceptibilities not performed"  Growth in fluid media only Enterococcus faecalis  --  Escherichia coli  Enterococcus faecalis

## 2019-10-01 LAB
ANION GAP SERPL CALC-SCNC: 11 MMOL/L — SIGNIFICANT CHANGE UP (ref 5–17)
BUN SERPL-MCNC: 8 MG/DL — SIGNIFICANT CHANGE UP (ref 7–23)
CALCIUM SERPL-MCNC: 7.8 MG/DL — LOW (ref 8.4–10.5)
CHLORIDE SERPL-SCNC: 94 MMOL/L — LOW (ref 96–108)
CO2 SERPL-SCNC: 31 MMOL/L — SIGNIFICANT CHANGE UP (ref 22–31)
CREAT SERPL-MCNC: 0.46 MG/DL — LOW (ref 0.5–1.3)
GLUCOSE SERPL-MCNC: 97 MG/DL — SIGNIFICANT CHANGE UP (ref 70–99)
MAGNESIUM SERPL-MCNC: 1.7 MG/DL — SIGNIFICANT CHANGE UP (ref 1.6–2.6)
PHOSPHATE SERPL-MCNC: 1.6 MG/DL — LOW (ref 2.5–4.5)
PHOSPHATE SERPL-MCNC: 5 MG/DL — HIGH (ref 2.5–4.5)
POTASSIUM SERPL-MCNC: 3.6 MMOL/L — SIGNIFICANT CHANGE UP (ref 3.5–5.3)
POTASSIUM SERPL-SCNC: 3.6 MMOL/L — SIGNIFICANT CHANGE UP (ref 3.5–5.3)
SODIUM SERPL-SCNC: 136 MMOL/L — SIGNIFICANT CHANGE UP (ref 135–145)

## 2019-10-01 PROCEDURE — 99232 SBSQ HOSP IP/OBS MODERATE 35: CPT | Mod: 24

## 2019-10-01 RX ORDER — SODIUM,POTASSIUM PHOSPHATES 278-250MG
1 POWDER IN PACKET (EA) ORAL ONCE
Refills: 0 | Status: COMPLETED | OUTPATIENT
Start: 2019-10-01 | End: 2019-10-01

## 2019-10-01 RX ADMIN — Medication 255 MILLIMOLE(S): at 09:51

## 2019-10-01 RX ADMIN — MAGNESIUM OXIDE 400 MG ORAL TABLET 400 MILLIGRAM(S): 241.3 TABLET ORAL at 18:07

## 2019-10-01 RX ADMIN — ATORVASTATIN CALCIUM 20 MILLIGRAM(S): 80 TABLET, FILM COATED ORAL at 22:17

## 2019-10-01 RX ADMIN — ALBUTEROL 2.5 MILLIGRAM(S): 90 AEROSOL, METERED ORAL at 23:02

## 2019-10-01 RX ADMIN — MAGNESIUM OXIDE 400 MG ORAL TABLET 400 MILLIGRAM(S): 241.3 TABLET ORAL at 09:52

## 2019-10-01 RX ADMIN — FAMOTIDINE 20 MILLIGRAM(S): 10 INJECTION INTRAVENOUS at 13:13

## 2019-10-01 RX ADMIN — NYSTATIN CREAM 1 APPLICATION(S): 100000 CREAM TOPICAL at 18:07

## 2019-10-01 RX ADMIN — NYSTATIN CREAM 1 APPLICATION(S): 100000 CREAM TOPICAL at 06:00

## 2019-10-01 RX ADMIN — Medication 1 PACKET(S): at 13:13

## 2019-10-01 RX ADMIN — TIOTROPIUM BROMIDE 1 CAPSULE(S): 18 CAPSULE ORAL; RESPIRATORY (INHALATION) at 13:13

## 2019-10-01 RX ADMIN — CHLORHEXIDINE GLUCONATE 1 APPLICATION(S): 213 SOLUTION TOPICAL at 10:31

## 2019-10-01 RX ADMIN — APIXABAN 5 MILLIGRAM(S): 2.5 TABLET, FILM COATED ORAL at 18:07

## 2019-10-01 RX ADMIN — APIXABAN 5 MILLIGRAM(S): 2.5 TABLET, FILM COATED ORAL at 06:00

## 2019-10-01 RX ADMIN — ERTAPENEM SODIUM 120 MILLIGRAM(S): 1 INJECTION, POWDER, LYOPHILIZED, FOR SOLUTION INTRAMUSCULAR; INTRAVENOUS at 22:59

## 2019-10-01 RX ADMIN — Medication 1: at 13:14

## 2019-10-01 RX ADMIN — MONTELUKAST 10 MILLIGRAM(S): 4 TABLET, CHEWABLE ORAL at 13:13

## 2019-10-01 RX ADMIN — MAGNESIUM OXIDE 400 MG ORAL TABLET 400 MILLIGRAM(S): 241.3 TABLET ORAL at 13:12

## 2019-10-01 NOTE — PROGRESS NOTE ADULT - SUBJECTIVE AND OBJECTIVE BOX
SUBJECTIVE:  Patient reports no issues overnight, complains of no pain except when removing the ABD and tape.      OBJECTIVE:  Vital Signs Last 24 Hrs  T(C): 36.7 (01 Oct 2019 06:00), Max: 36.9 (30 Sep 2019 16:08)  T(F): 98.1 (01 Oct 2019 06:00), Max: 98.5 (30 Sep 2019 16:08)  HR: 102 (01 Oct 2019 06:00) (91 - 102)  BP: 108/72 (01 Oct 2019 06:00) (95/61 - 116/69)  BP(mean): --  RR: 18 (01 Oct 2019 06:00) (18 - 18)  SpO2: 99% (01 Oct 2019 06:00) (99% - 100%)    I&O's Detail    30 Sep 2019 07:01  -  01 Oct 2019 07:00  --------------------------------------------------------  IN:    Oral Fluid: 360 mL    Solution: 500 mL    Solution: 50 mL  Total IN: 910 mL    OUT:    Drain: 15 mL    Drain: 10 mL    Voided: 800 mL  Total OUT: 825 mL    Total NET: 85 mL          Inpatient Medications:  MEDICATIONS  (STANDING):  apixaban 5 milliGRAM(s) Oral every 12 hours  atorvastatin 20 milliGRAM(s) Oral at bedtime  chlorhexidine 2% Cloths 1 Application(s) Topical daily  chlorhexidine 4% Liquid 1 Application(s) Topical <User Schedule>  dextrose 5%. 1000 milliLiter(s) (50 mL/Hr) IV Continuous <Continuous>  dextrose 50% Injectable 12.5 Gram(s) IV Push once  dextrose 50% Injectable 25 Gram(s) IV Push once  dextrose 50% Injectable 25 Gram(s) IV Push once  enalapril 5 milliGRAM(s) Oral daily  ertapenem  IVPB 1000 milliGRAM(s) IV Intermittent every 24 hours  famotidine    Tablet 20 milliGRAM(s) Oral daily  furosemide    Tablet 40 milliGRAM(s) Oral daily  insulin lispro (HumaLOG) corrective regimen sliding scale   SubCutaneous Before meals and at bedtime  insulin lispro (HumaLOG) corrective regimen sliding scale   SubCutaneous at bedtime  magnesium oxide 400 milliGRAM(s) Oral three times a day with meals  montelukast 10 milliGRAM(s) Oral daily  nystatin Powder 1 Application(s) Topical two times a day  sodium phosphate IVPB 30 milliMole(s) IV Intermittent once  spironolactone 25 milliGRAM(s) Oral daily  tiotropium 18 MICROgram(s) Capsule 1 Capsule(s) Inhalation daily    MEDICATIONS  (PRN):  ALBUTerol    0.083% 2.5 milliGRAM(s) Nebulizer every 6 hours PRN Shortness of Breath and/or Wheezing  dextrose 40% Gel 15 Gram(s) Oral once PRN Blood Glucose LESS THAN 70 milliGRAM(s)/deciliter  glucagon  Injectable 1 milliGRAM(s) IntraMuscular once PRN Glucose LESS THAN 70 milligrams/deciliter  sodium chloride 0.9% lock flush 10 milliLiter(s) IV Push every 1 hour PRN Pre/post blood products, medications, blood draw, and to maintain line patency      Physical Examination:  GEN: NAD, resting quietly  NEURO: AAOx3, CN II-XII grossly intact, no focal deficits  PULM: symmetric chest rise bilaterally, no increased WOB  ABD: soft, nontender, nondistended,xeroform in place over graft site. Site healing well. Fistula pouch in place inferiorly, colostomy in place, viable and functioning.  EXTR: no cyanosis or edema, moving all extremities. Right thigh ABD CDI, left in place    LABS:        Phos  1.6     10-01        CULTURES:  .Blood  09-16 @ 12:08   No growth at 5 days.  --  --      .Blood  09-15 @ 23:34   No growth at 5 days.  --  --      .Abscess  09-13 @ 22:59   Numerous Escherichia coli ESBL  Numerous Enterococcus faecalis  --  Escherichia coli ESBL  Enterococcus faecalis      .Blood  09-12 @ 22:49   Growth in aerobic bottle: Staphylococcus epidermidis  --  Coag Negative Staphylococcus      BLOOD PERIPHERAL  09-03 @ 07:17 --    NO ORGANISMS ISOLATED  --      .Blood  08-30 @ 18:43   No growth at 5 days.  --  --      .Surgical Swab  08-30 @ 18:42   Few Escherichia coli  Rare Enterococcus faecalis  Rare Candida albicans "Susceptibilities not performed"  --  Escherichia coli  Enterococcus faecalis      .Blood  08-30 @ 18:00   No growth at 5 days.  --  --      .Body Fluid right abdomen wall  08-16 @ 15:21   No growth at 5 days  --    Few polymorphonuclear leukocytes per low power field  No organisms seen per oil power field      .Body Fluid left retroperitoneal  08-16 @ 15:18   Numerous Escherichia coli  Numerous Alpha hemolytic strep "Susceptibilities not performed"  Moderate Bacteroides thetaiotaomicron "Susceptibilities not performed"  Few Enterococcus faecalis  --  Escherichia coli  Enterococcus faecalis      .Blood  08-16 @ 13:08   No growth at 5 days.  --  --      .Blood  08-14 @ 17:44   Growth in aerobic bottle: Coag Negative Staphylococcus  Single set isolate, possible contaminant. Contact  Microbiology if susceptibility testing clinically  indicated.  "Due to technical problems, Proteus sp. will Not be reported as part of  the BCID panel until further notice"  ***Blood Panel PCR results on this specimen are available  approximately 3 hours after the Gram stain result.***  Gram stain, PCR, and/or culture results may not always  correspond due to difference in methodologies.  ************************************************************  This PCR assay was performed using [employer].  The following targets are tested for: Enterococcus,  vancomycin resistant enterococci, Listeria monocytogenes,  coagulase negative staphylococci, S. aureus,  methicillin resistant S. aureus, Streptococcus agalactiae  (Group B), S. pneumoniae, S. pyogenes (Group A),  Acinetobacter baumannii, Enterobacter cloacae, E. coli,  Klebsiella oxytoca, K. pneumoniae, Proteus sp.,  Serratia marcescens, Haemophilus influenzae,  Neisseria meningitidis, Pseudomonas aeruginosa, Candida  albicans, C. glabrata, C krusei, C parapsilosis,  C. tropicalis and the KPC resistance gene.  --  Blood Culture PCR      .Sputum  07-29 @ 04:17   Moderate Pseudomonas aeruginosa (Carbapenem Resistant) #2  Normal Respiratory Liz present  --  Pseudomonas aeruginosa (Carbapenem Resistant)      .Urine  07-24 @ 01:06   No growth  --  --      .Body Fluid  07-23 @ 13:26   Numerous Escherichia coli  Few Coag Negative Staphylococcus  Numerous Escherichia coli #2  --  Escherichia coli  Escherichia coli      .Blood  07-10 @ 20:09   No growth at 5 days.  --  --      .Blood  07-10 @ 20:08   No growth at 5 days.  --  --      .Body Fluid abdominal fluid  07-09 @ 17:54   Few Enterococcus faecalis  Few Coag Negative Staphylococcus  --  Enterococcus faecalis  Coag Negative Staphylococcus      .Body Fluid abdominal fluid  07-09 @ 17:46   Few Enterococcus faecalis  Few Coag Negative Staphylococcus  --  Enterococcus faecalis  Coag Negative Staphylococcus

## 2019-10-01 NOTE — PROGRESS NOTE ADULT - ASSESSMENT
75 yo Female with PMH of asthma, A fib, dm, HFrEF, pacemaker/AICD, perforated diverticulitis s/p L hemicolectomy and end colostomy w/ multiple washouts and abdominal wall closure w/ strattice complicated by persistent intraperitoneal abscess 2/2 rectal stump leak now s/p split thickness skin graft on 9/23 with Dr. Perez, recovering well. Patient ready for discharge at this time from a plastic surgery standpoint, vac taken down today and healing very well.    Recommendations:    ~Xeroform every other day to abdominal graft site, covered with ABD or gauze.   ~Ostomy pouch for fistula management per ostomy nurses (approximately q5d changes)  ~Leave xeroform on donor right thigh site until follow up visit with Dr. Perez (1 week) but PLEASE change ABD every day. It adheres to patient and she is very uncomfortable  if it is on for more than one day  ~Please follow up with Dr. Perez in 1-2 weeks.

## 2019-10-01 NOTE — PROGRESS NOTE ADULT - SUBJECTIVE AND OBJECTIVE BOX
Surgery Progress Note    SUBJECTIVE/ROS: Patient examined at bed side. No acute events overnight.   Denies nausea, vomiting, chest pain, shortness of breath  Worked with PT yesterday, reports feeling tired      ALBUTerol    0.083% 2.5 milliGRAM(s) every 6 hours PRN  apixaban 5 milliGRAM(s) every 12 hours  atorvastatin 20 milliGRAM(s) at bedtime  chlorhexidine 2% Cloths 1 Application(s) daily  chlorhexidine 4% Liquid 1 Application(s) <User Schedule>  dextrose 40% Gel 15 Gram(s) once PRN  dextrose 5%. 1000 milliLiter(s) <Continuous>  dextrose 50% Injectable 12.5 Gram(s) once  dextrose 50% Injectable 25 Gram(s) once  dextrose 50% Injectable 25 Gram(s) once  enalapril 5 milliGRAM(s) daily  ertapenem  IVPB 1000 milliGRAM(s) every 24 hours  famotidine    Tablet 20 milliGRAM(s) daily  furosemide    Tablet 40 milliGRAM(s) daily  glucagon  Injectable 1 milliGRAM(s) once PRN  insulin lispro (HumaLOG) corrective regimen sliding scale   Before meals and at bedtime  insulin lispro (HumaLOG) corrective regimen sliding scale   at bedtime  magnesium oxide 400 milliGRAM(s) three times a day with meals  montelukast 10 milliGRAM(s) daily  nystatin Powder 1 Application(s) two times a day  sodium chloride 0.9% lock flush 10 milliLiter(s) every 1 hour PRN  spironolactone 25 milliGRAM(s) daily  tiotropium 18 MICROgram(s) Capsule 1 Capsule(s) daily      Objective:  Vital Signs Last 24 Hrs  T(C): 36.7 (01 Oct 2019 01:21), Max: 36.9 (30 Sep 2019 16:08)  T(F): 98.1 (01 Oct 2019 01:21), Max: 98.5 (30 Sep 2019 16:08)  HR: 93 (01 Oct 2019 01:21) (91 - 100)  BP: 103/67 (01 Oct 2019 01:21) (95/61 - 116/69)  BP(mean): --  RR: 18 (01 Oct 2019 01:21) (18 - 18)  SpO2: 99% (01 Oct 2019 01:21) (99% - 100%)    Physical Exam:  Constitutional: NAD, resting comfortable in bed   Respiratory: No increased WOB, on RA  Gastrointestinal: soft, compressible, non-distended, mid abdominal wound vac in place with appropriate suction, draining pouch inferior to wound vac with purulent drainage. Left side IR drain with serosanguinous output, aspirated. Right sided ostomy is pink with stool in bag.   Genitourinary: Primafit in place   Extremities: all four extremities moving spontaneously         09-29 @ 07:01  -  09-30 @ 07:00  --------------------------------------------------------  IN: 560 mL / OUT: 872 mL / NET: -312 mL    09-30 @ 07:01  -  10-01 @ 04:11  --------------------------------------------------------  IN: 860 mL / OUT: 365 mL / NET: 495 mL            LABS:                            8.0    9.20  )-----------( 201      ( 29 Sep 2019 08:42 )             26.6     09-29    139  |  104  |  9   ----------------------------<  89  3.6   |  23  |  0.40<L>    Ca    7.4<L>      29 Sep 2019 07:28  Phos  1.8     09-30  Mg     1.5     09-29

## 2019-10-01 NOTE — PROGRESS NOTE ADULT - ASSESSMENT
75 yo Female with PMH of asthma, A fib, dm, HFrEF, pacemaker/AICD, perforated diverticulitis s/p L hemicolectomy and end colostomy w/ multiple washouts and abdominal wall closure w/ strattice (last surgery on 7/11), presents from [place] due to possible dislodgement of IR drain and increased purulent output from her abdomen. CT scan showing increase in size of L paracolic gutter fluid collection, however drains appear in place. POD 8 s/p IR upsizing, though continued low drain output. Wound vac placed over abdominal wound (9/16/2019). S/p split thickness skin graft on 9/23 with Dr. Perez. Continues skin graft with wound vac to suction and draining fistula to pouch.     - per plastics: xerform dressing over abdominal skin flap change every other day, leg dressing with xerform leave on until follow up with Dr. Perez  - Diet: soft diet   - Switch Meropenem to Ertapenem for once daily dosing per ID  - Will include follow-up with Hematology after discharge for further management.  - dispo planning - return to [place]  - Repeat phos only today given low phos last 2 blood draws    [facility], [id number]

## 2019-10-02 ENCOUNTER — TRANSCRIPTION ENCOUNTER (OUTPATIENT)
Age: 74
End: 2019-10-02

## 2019-10-02 LAB — PHOSPHATE SERPL-MCNC: 1.9 MG/DL — LOW (ref 2.5–4.5)

## 2019-10-02 PROCEDURE — 99232 SBSQ HOSP IP/OBS MODERATE 35: CPT | Mod: 24

## 2019-10-02 PROCEDURE — 99231 SBSQ HOSP IP/OBS SF/LOW 25: CPT

## 2019-10-02 RX ORDER — INSULIN LISPRO 100/ML
0 VIAL (ML) SUBCUTANEOUS
Qty: 0 | Refills: 0 | DISCHARGE
Start: 2019-10-02

## 2019-10-02 RX ORDER — SODIUM,POTASSIUM PHOSPHATES 278-250MG
1 POWDER IN PACKET (EA) ORAL ONCE
Refills: 0 | Status: COMPLETED | OUTPATIENT
Start: 2019-10-02 | End: 2019-10-02

## 2019-10-02 RX ORDER — NYSTATIN CREAM 100000 [USP'U]/G
1 CREAM TOPICAL
Qty: 0 | Refills: 0 | DISCHARGE
Start: 2019-10-02

## 2019-10-02 RX ORDER — ERTAPENEM SODIUM 1 G/1
1 INJECTION, POWDER, LYOPHILIZED, FOR SOLUTION INTRAMUSCULAR; INTRAVENOUS
Qty: 0 | Refills: 0 | DISCHARGE
Start: 2019-10-02

## 2019-10-02 RX ORDER — ACETAMINOPHEN 500 MG
650 TABLET ORAL ONCE
Refills: 0 | Status: COMPLETED | OUTPATIENT
Start: 2019-10-02 | End: 2019-10-02

## 2019-10-02 RX ADMIN — ATORVASTATIN CALCIUM 20 MILLIGRAM(S): 80 TABLET, FILM COATED ORAL at 21:59

## 2019-10-02 RX ADMIN — NYSTATIN CREAM 1 APPLICATION(S): 100000 CREAM TOPICAL at 06:14

## 2019-10-02 RX ADMIN — Medication 1 PACKET(S): at 12:36

## 2019-10-02 RX ADMIN — CHLORHEXIDINE GLUCONATE 1 APPLICATION(S): 213 SOLUTION TOPICAL at 13:24

## 2019-10-02 RX ADMIN — Medication 1: at 22:00

## 2019-10-02 RX ADMIN — APIXABAN 5 MILLIGRAM(S): 2.5 TABLET, FILM COATED ORAL at 06:14

## 2019-10-02 RX ADMIN — NYSTATIN CREAM 1 APPLICATION(S): 100000 CREAM TOPICAL at 22:00

## 2019-10-02 RX ADMIN — ERTAPENEM SODIUM 120 MILLIGRAM(S): 1 INJECTION, POWDER, LYOPHILIZED, FOR SOLUTION INTRAMUSCULAR; INTRAVENOUS at 22:01

## 2019-10-02 RX ADMIN — MONTELUKAST 10 MILLIGRAM(S): 4 TABLET, CHEWABLE ORAL at 13:21

## 2019-10-02 RX ADMIN — MAGNESIUM OXIDE 400 MG ORAL TABLET 400 MILLIGRAM(S): 241.3 TABLET ORAL at 17:27

## 2019-10-02 RX ADMIN — TIOTROPIUM BROMIDE 1 CAPSULE(S): 18 CAPSULE ORAL; RESPIRATORY (INHALATION) at 13:21

## 2019-10-02 RX ADMIN — MAGNESIUM OXIDE 400 MG ORAL TABLET 400 MILLIGRAM(S): 241.3 TABLET ORAL at 13:20

## 2019-10-02 RX ADMIN — FAMOTIDINE 20 MILLIGRAM(S): 10 INJECTION INTRAVENOUS at 13:21

## 2019-10-02 RX ADMIN — MAGNESIUM OXIDE 400 MG ORAL TABLET 400 MILLIGRAM(S): 241.3 TABLET ORAL at 09:15

## 2019-10-02 NOTE — PROGRESS NOTE ADULT - SUBJECTIVE AND OBJECTIVE BOX
Interventional Radiology Follow- Up Note    73 y/o F with h/o perforated diverticulitis s/p L hemicolectomy and end colostomy w/ multiple washouts and abdominal wall closure w/ strattice (last surgery on 7/11/19)  with h/o initial percutaneous drainage X 2 of left side abdominal fluid collections on 8/16/19 that were last checked 8/21/2019 s/p tube check and upsizing on 9/13.   IR called to evaluate suture on the drain.   Per surgery resident, medial drain was removed.     Vitals: T(F): 98.7 (10-02-19 @ 13:26), Max: 98.9 (10-01-19 @ 21:35)  HR: 100 (10-02-19 @ 13:26) (98 - 108)  BP: 116/70 (10-02-19 @ 13:26) (109/70 - 116/74)  RR: 18 (10-02-19 @ 13:26) (18 - 18)  SpO2: 99% (10-02-19 @ 13:26) (98% - 100%)  Wt(kg): --    LABS:    10-01    136  |  94<L>  |  8   ----------------------------<  97  3.6   |  31  |  0.46<L>    Ca    7.8<L>      01 Oct 2019 14:56  Phos  1.9     10-02  Mg     1.7     10-01    PHYSICAL EXAM:  General: Nontoxic, in NAD  Neuro:  Alert & oriented x 3  Abdomen: 16french drain in LLQ. The suture was noted be not intact. Suture was placed after numbing the area. Flushed with 6cc of sterile saline. 10cc/24hrs. Serosanguinous output in BASIM drain.     Impression: 73 y/o F with h/o perforated diverticulitis s/p L hemicolectomy and end colostomy w/ multiple washouts and abdominal wall closure w/ strattice (last surgery on 7/11/19)  with h/o initial percutaneous drainage X 2 of left side abdominal fluid collections on 8/16/19 that were last checked 8/21/2019 s/p tube check and upsizing on 9/13.    Plan:  - follow up with IR next week.   - Business card given to pt.   - flush with 5cc saline.     Please call IR at extension [phone] with any questions, concerns, or issues regarding above. Interventional Radiology Follow- Up Note    73 y/o F with h/o perforated diverticulitis s/p L hemicolectomy and end colostomy w/ multiple washouts and abdominal wall closure w/ strattice (last surgery on 7/11/19)  with h/o initial percutaneous drainage X 2 of left side abdominal fluid collections on 8/16/19 that were last checked 8/21/2019 s/p tube check and upsizing on 9/13.   IR called to evaluate suture on the drain.   Per surgery resident, medial drain was removed.     Vitals: T(F): 98.7 (10-02-19 @ 13:26), Max: 98.9 (10-01-19 @ 21:35)  HR: 100 (10-02-19 @ 13:26) (98 - 108)  BP: 116/70 (10-02-19 @ 13:26) (109/70 - 116/74)  RR: 18 (10-02-19 @ 13:26) (18 - 18)  SpO2: 99% (10-02-19 @ 13:26) (98% - 100%)  Wt(kg): --    LABS:    10-01    136  |  94<L>  |  8   ----------------------------<  97  3.6   |  31  |  0.46<L>    Ca    7.8<L>      01 Oct 2019 14:56  Phos  1.9     10-02  Mg     1.7     10-01    PHYSICAL EXAM:  General: Nontoxic, in NAD  Neuro:  Alert & oriented x 3  Abdomen: 16french drain in LLQ. The suture was noted be not intact. Suture was placed after numbing the area. Flushed with 6cc of sterile saline. 10cc/24hrs. Serosanguinous output in BASIM drain.     Impression: 73 y/o F with h/o perforated diverticulitis s/p L hemicolectomy and end colostomy w/ multiple washouts and abdominal wall closure w/ strattice (last surgery on 7/11/19)  with h/o initial percutaneous drainage X 2 of left side abdominal fluid collections on 8/16/19 that were last checked 8/21/2019 s/p tube check and upsizing on 9/13.    Plan:  - follow up with IR next week.   - Business card given to pt.   - flush with 5cc saline.   - d/w surgery team PA and Dr. Neal.     Please call IR at extension [phone] or [phone] with any questions, concerns, or issues regarding above. Interventional Radiology Follow- Up Note    75 y/o F with h/o perforated diverticulitis s/p L hemicolectomy and end colostomy w/ multiple washouts and abdominal wall closure w/ strattice (last surgery on 7/11/19)  with h/o initial percutaneous drainage X 2 of left side abdominal fluid collections on 8/16/19 that were last checked 8/21/2019 s/p tube check and upsizing on 9/13.   IR called to evaluate suture on the drain.   Per surgery resident, medial drain was removed.     Vitals: T(F): 98.7 (10-02-19 @ 13:26), Max: 98.9 (10-01-19 @ 21:35)  HR: 100 (10-02-19 @ 13:26) (98 - 108)  BP: 116/70 (10-02-19 @ 13:26) (109/70 - 116/74)  RR: 18 (10-02-19 @ 13:26) (18 - 18)  SpO2: 99% (10-02-19 @ 13:26) (98% - 100%)  Wt(kg): --    LABS:    10-01    136  |  94<L>  |  8   ----------------------------<  97  3.6   |  31  |  0.46<L>    Ca    7.8<L>      01 Oct 2019 14:56  Phos  1.9     10-02  Mg     1.7     10-01    PHYSICAL EXAM:  General: Nontoxic, in NAD  Neuro:  Alert & oriented x 3  Abdomen: 16french drain in LLQ. The suture was noted be not intact. Suture was placed after numbing the area. Flushed with 6cc of sterile saline. 10cc/24hrs. Serosanguinous output in BASIM drain.     Impression: 75 y/o F with h/o perforated diverticulitis s/p L hemicolectomy and end colostomy w/ multiple washouts and abdominal wall closure w/ strattice (last surgery on 7/11/19)  with h/o initial percutaneous drainage X 2 of left side abdominal fluid collections on 8/16/19 that were last checked 8/21/2019 s/p tube check and upsizing on 9/13.    Plan:  - follow up with IR when output is less than 10cc/24hrs. Recommended for next week.  - Business card given to pt.   - flush with 5cc saline.   - d/w surgery team PA and Dr. Neal.     Please call IR at extension [phone] with any questions, concerns, or issues regarding above.

## 2019-10-02 NOTE — PROVIDER CONTACT NOTE (OTHER) - ACTION/TREATMENT ORDERED:
PA aware, at bedside
As per MD, no further action at this time, will continue to monitor
As per MD, no further action at this time, will continue to monitor.
As per MD, no further action at this time. Will continue to monitor.
IV line place until PICC is placed in morning.

## 2019-10-02 NOTE — PROVIDER CONTACT NOTE (OTHER) - SITUATION
pt with blood glucose of 76
, all BP meds d/t parameter
PICC line removed prior discharge based on a deaccess order
Urine output 100cc for 12 hour night shift

## 2019-10-02 NOTE — PROGRESS NOTE ADULT - SUBJECTIVE AND OBJECTIVE BOX
Surgery Progress Note    SUBJECTIVE/ROS: Patient examined at bed side. No acute events overnight.   Denies nausea, vomiting, chest pain, shortness of breath  Drain changed to bulb suction to assist in drainage      ALBUTerol    0.083% 2.5 milliGRAM(s) every 6 hours PRN  apixaban 5 milliGRAM(s) every 12 hours  atorvastatin 20 milliGRAM(s) at bedtime  chlorhexidine 2% Cloths 1 Application(s) daily  chlorhexidine 4% Liquid 1 Application(s) <User Schedule>  dextrose 40% Gel 15 Gram(s) once PRN  dextrose 5%. 1000 milliLiter(s) <Continuous>  dextrose 50% Injectable 12.5 Gram(s) once  dextrose 50% Injectable 25 Gram(s) once  dextrose 50% Injectable 25 Gram(s) once  enalapril 5 milliGRAM(s) daily  ertapenem  IVPB 1000 milliGRAM(s) every 24 hours  famotidine    Tablet 20 milliGRAM(s) daily  furosemide    Tablet 40 milliGRAM(s) daily  glucagon  Injectable 1 milliGRAM(s) once PRN  insulin lispro (HumaLOG) corrective regimen sliding scale   Before meals and at bedtime  insulin lispro (HumaLOG) corrective regimen sliding scale   at bedtime  magnesium oxide 400 milliGRAM(s) three times a day with meals  montelukast 10 milliGRAM(s) daily  nystatin Powder 1 Application(s) two times a day  sodium chloride 0.9% lock flush 10 milliLiter(s) every 1 hour PRN  spironolactone 25 milliGRAM(s) daily  tiotropium 18 MICROgram(s) Capsule 1 Capsule(s) daily      Objective:  Vital Signs Last 24 Hrs  T(C): 36.7 (01 Oct 2019 01:21), Max: 36.9 (30 Sep 2019 16:08)  T(F): 98.1 (01 Oct 2019 01:21), Max: 98.5 (30 Sep 2019 16:08)  HR: 93 (01 Oct 2019 01:21) (91 - 100)  BP: 103/67 (01 Oct 2019 01:21) (95/61 - 116/69)  BP(mean): --  RR: 18 (01 Oct 2019 01:21) (18 - 18)  SpO2: 99% (01 Oct 2019 01:21) (99% - 100%)    Physical Exam:  Constitutional: NAD, resting comfortable in bed   Respiratory: No increased WOB, on RA  Gastrointestinal: soft, compressible, non-distended, mid abdominal wound with xerform, draining pouch inferior to wound vac with purulent drainage. Left side IR drain with serosanguinous output, now with attached BASIM bulb, aspirated. Right sided ostomy is pink with stool in bag.   Genitourinary: Primafit in place   Extremities: all four extremities moving spontaneously           09-30 @ 07:01  -  10-01 @ 07:00  --------------------------------------------------------  IN: 910 mL / OUT: 825 mL / NET: 85 mL    10-01 @ 07:01  -  10-02 @ 01:54  --------------------------------------------------------  IN: 840 mL / OUT: 335 mL / NET: 505 mL              LABS:          10-01    136  |  94<L>  |  8   ----------------------------<  97  3.6   |  31  |  0.46<L>    Ca    7.8<L>      01 Oct 2019 14:56  Phos  5.0     10-01  Mg     1.7     10-01

## 2019-10-02 NOTE — PROVIDER CONTACT NOTE (OTHER) - ASSESSMENT
All other VSS, a&ox4, no c/o pain or discomfort
All other VSS, a&ox4, no c/o pain or discomfort, otherwise asymptomatic
VSS except mild tachycardia, a&ox4, no c/o pain or discomfort
as per team pt's discharge instructions should have included IV antibiotics

## 2019-10-02 NOTE — PROGRESS NOTE ADULT - SUBJECTIVE AND OBJECTIVE BOX
SUBJECTIVE:  Reports no pain overnight just significant pain when removing the ABD from the donor site. She had no acute events overnight and is set up for discharge to rehab today.    OBJECTIVE:  Vital Signs Last 24 Hrs  T(C): 37 (02 Oct 2019 05:58), Max: 37.2 (01 Oct 2019 21:35)  T(F): 98.6 (02 Oct 2019 05:58), Max: 98.9 (01 Oct 2019 21:35)  HR: 108 (02 Oct 2019 05:58) (96 - 108)  BP: 111/72 (02 Oct 2019 05:58) (111/71 - 118/75)  BP(mean): --  RR: 18 (02 Oct 2019 05:58) (18 - 18)  SpO2: 100% (02 Oct 2019 05:58) (97% - 100%)    I&O's Detail    01 Oct 2019 07:01  -  02 Oct 2019 07:00  --------------------------------------------------------  IN:    Oral Fluid: 340 mL    Solution: 500 mL    Solution: 50 mL  Total IN: 890 mL    OUT:    Colostomy: 70 mL    Drain: 10 mL    Drain: 10 mL    Voided: 400 mL  Total OUT: 490 mL    Total NET: 400 mL          Inpatient Medications:  MEDICATIONS  (STANDING):  apixaban 5 milliGRAM(s) Oral every 12 hours  atorvastatin 20 milliGRAM(s) Oral at bedtime  chlorhexidine 2% Cloths 1 Application(s) Topical daily  chlorhexidine 4% Liquid 1 Application(s) Topical <User Schedule>  dextrose 5%. 1000 milliLiter(s) (50 mL/Hr) IV Continuous <Continuous>  dextrose 50% Injectable 12.5 Gram(s) IV Push once  dextrose 50% Injectable 25 Gram(s) IV Push once  dextrose 50% Injectable 25 Gram(s) IV Push once  enalapril 5 milliGRAM(s) Oral daily  ertapenem  IVPB 1000 milliGRAM(s) IV Intermittent every 24 hours  famotidine    Tablet 20 milliGRAM(s) Oral daily  furosemide    Tablet 40 milliGRAM(s) Oral daily  insulin lispro (HumaLOG) corrective regimen sliding scale   SubCutaneous Before meals and at bedtime  insulin lispro (HumaLOG) corrective regimen sliding scale   SubCutaneous at bedtime  magnesium oxide 400 milliGRAM(s) Oral three times a day with meals  montelukast 10 milliGRAM(s) Oral daily  nystatin Powder 1 Application(s) Topical two times a day  spironolactone 25 milliGRAM(s) Oral daily  tiotropium 18 MICROgram(s) Capsule 1 Capsule(s) Inhalation daily    MEDICATIONS  (PRN):  ALBUTerol    0.083% 2.5 milliGRAM(s) Nebulizer every 6 hours PRN Shortness of Breath and/or Wheezing  dextrose 40% Gel 15 Gram(s) Oral once PRN Blood Glucose LESS THAN 70 milliGRAM(s)/deciliter  glucagon  Injectable 1 milliGRAM(s) IntraMuscular once PRN Glucose LESS THAN 70 milligrams/deciliter  sodium chloride 0.9% lock flush 10 milliLiter(s) IV Push every 1 hour PRN Pre/post blood products, medications, blood draw, and to maintain line patency      Physical Examination:  GEN: NAD, resting quietly  NEURO: AAOx3, CN II-XII grossly intact, no focal deficits  PULM: symmetric chest rise bilaterally, no increased WOB  ABD: soft, nontender, nondistended, xeroform in place over graft site, replaced. Site healing well. Fistula pouch in place inferiorly, colostomy in place, viable and functioning.  EXTR: no cyanosis or edema, moving all extremities. Right thigh ABD removed, healing well, dressing replaced        LABS:      10-01    136  |  94<L>  |  8   ----------------------------<  97  3.6   |  31  |  0.46<L>    Ca    7.8<L>      01 Oct 2019 14:56  Phos  5.0     10-01  Mg     1.7     10-01        CULTURES:  .Blood  09-16 @ 12:08   No growth at 5 days.  --  --      .Blood  09-15 @ 23:34   No growth at 5 days.  --  --      .Abscess  09-13 @ 22:59   Numerous Escherichia coli ESBL  Numerous Enterococcus faecalis  --  Escherichia coli ESBL  Enterococcus faecalis      .Blood  09-12 @ 22:49   Growth in aerobic bottle: Staphylococcus epidermidis  --  Coag Negative Staphylococcus      BLOOD PERIPHERAL  09-03 @ 07:17 --    NO ORGANISMS ISOLATED  --      .Blood  08-30 @ 18:43   No growth at 5 days.  --  --      .Surgical Swab  08-30 @ 18:42   Few Escherichia coli  Rare Enterococcus faecalis  Rare Candida albicans "Susceptibilities not performed"  --  Escherichia coli  Enterococcus faecalis      .Blood  08-30 @ 18:00   No growth at 5 days.  --  --      .Body Fluid right abdomen wall  08-16 @ 15:21   No growth at 5 days  --    Few polymorphonuclear leukocytes per low power field  No organisms seen per oil power field      .Body Fluid left retroperitoneal  08-16 @ 15:18   Numerous Escherichia coli  Numerous Alpha hemolytic strep "Susceptibilities not performed"  Moderate Bacteroides thetaiotaomicron "Susceptibilities not performed"  Few Enterococcus faecalis  --  Escherichia coli  Enterococcus faecalis      .Blood  08-16 @ 13:08   No growth at 5 days.  --  --      .Blood  08-14 @ 17:44   Growth in aerobic bottle: Coag Negative Staphylococcus  Single set isolate, possible contaminant. Contact  Microbiology if susceptibility testing clinically  indicated.  "Due to technical problems, Proteus sp. will Not be reported as part of  the BCID panel until further notice"  ***Blood Panel PCR results on this specimen are available  approximately 3 hours after the Gram stain result.***  Gram stain, PCR, and/or culture results may not always  correspond due to difference in methodologies.  ************************************************************  This PCR assay was performed using [employer].  The following targets are tested for: Enterococcus,  vancomycin resistant enterococci, Listeria monocytogenes,  coagulase negative staphylococci, S. aureus,  methicillin resistant S. aureus, Streptococcus agalactiae  (Group B), S. pneumoniae, S. pyogenes (Group A),  Acinetobacter baumannii, Enterobacter cloacae, E. coli,  Klebsiella oxytoca, K. pneumoniae, Proteus sp.,  Serratia marcescens, Haemophilus influenzae,  Neisseria meningitidis, Pseudomonas aeruginosa, Candida  albicans, C. glabrata, C krusei, C parapsilosis,  C. tropicalis and the KPC resistance gene.  --  Blood Culture PCR      .Sputum  07-29 @ 04:17   Moderate Pseudomonas aeruginosa (Carbapenem Resistant) #2  Normal Respiratory Liz present  --  Pseudomonas aeruginosa (Carbapenem Resistant)      .Urine  07-24 @ 01:06   No growth  --  --      .Body Fluid  07-23 @ 13:26   Numerous Escherichia coli  Few Coag Negative Staphylococcus  Numerous Escherichia coli #2  --  Escherichia coli  Escherichia coli      .Blood  07-10 @ 20:09   No growth at 5 days.  --  --      .Blood  07-10 @ 20:08   No growth at 5 days.  --  --      .Body Fluid abdominal fluid  07-09 @ 17:54   Few Enterococcus faecalis  Few Coag Negative Staphylococcus  --  Enterococcus faecalis  Coag Negative Staphylococcus      .Body Fluid abdominal fluid  07-09 @ 17:46   Few Enterococcus faecalis  Few Coag Negative Staphylococcus  --  Enterococcus faecalis  Coag Negative Staphylococcus

## 2019-10-02 NOTE — PROVIDER CONTACT NOTE (MEDICATION) - ACTION/TREATMENT ORDERED:
As per MD, this is OK, will continue to monitor.
Spoke w/MD & ok to give IVP dextrose 12.5 & will update sliding scale orders. BS rechecked & increased to 141. No further action at this time. Will alert MD.

## 2019-10-02 NOTE — PROGRESS NOTE ADULT - ATTENDING COMMENTS
I have seen and evaluated the patient and discussed the relevant clinical findings and plan with the surgical housestaff and fellow.  Agree with the above documentation with addenda as noted.         Clark Avlarado MD

## 2019-10-02 NOTE — PROGRESS NOTE ADULT - ASSESSMENT
75 yo Female with PMH of asthma, A fib, dm, HFrEF, pacemaker/AICD, perforated diverticulitis s/p L hemicolectomy and end colostomy w/ multiple washouts and abdominal wall closure w/ strattice (last surgery on 7/11), presents from [place] due to possible dislodgement of IR drain and increased purulent output from her abdomen. CT scan showing increase in size of L paracolic gutter fluid collection, however drains appear in place. POD 8 s/p IR upsizing, though continued low drain output. Wound vac placed over abdominal wound (9/16/2019). S/p split thickness skin graft on 9/23 with Dr. Perez. Continues skin graft with wound vac to suction and draining fistula to pouch.     - per plastics: xerform dressing over abdominal skin flap change every other day, leg dressing with xerform leave on until follow up with Dr. Perez  - Diet: soft diet   - Continue Ertapenem for 28 days per ID   - Will include follow-up with Hematology after discharge for further management.  - dispo planning - return to [place] today      [facility], [phone]

## 2019-10-02 NOTE — PROVIDER CONTACT NOTE (MEDICATION) - ASSESSMENT
VSS, a&ox4, no c/o pain or discomfort
Pt BS taken @ 2151 & 2158 to be 60 & 58. MD made aware previously to change blood sugar sliding scale orders to AC & HS. RN gave pt apple juice & BS rechecked @ 2501 (24).

## 2019-10-02 NOTE — DISCHARGE NOTE NURSING/CASE MANAGEMENT/SOCIAL WORK - NSDCFUADDAPPT_GEN_ALL_CORE_FT
F/U with Dr. Perez (Plastic Surgeon) in 1-2 weeks  Follow up with Dr. Goldberg (Hematology/Oncology) for further monitoring/workup of anemia

## 2019-10-02 NOTE — DISCHARGE NOTE NURSING/CASE MANAGEMENT/SOCIAL WORK - PATIENT PORTAL LINK FT
You can access the FollowMyHealth Patient Portal offered by [facility] by registering at the following website: [email or web address]. By joining [employer]’s FollowMyHealth portal, you will also be able to view your health information using other applications (apps) compatible with our system.

## 2019-10-02 NOTE — PROVIDER CONTACT NOTE (OTHER) - RECOMMENDATIONS
IVF? family and RN at bedside, encouraging pt to eat/drink
MD notified and aware
MD notified and aware
MD notified and aware, RN suggests bladder scan
Place another PICC for discharge

## 2019-10-03 PROCEDURE — 99232 SBSQ HOSP IP/OBS MODERATE 35: CPT | Mod: 24

## 2019-10-03 RX ORDER — WATER FOR INHALATION
10 VIAL, NEBULIZER (ML) INHALATION
Qty: 30 | Refills: 0
Start: 2019-10-03 | End: 2019-11-01

## 2019-10-03 RX ADMIN — ERTAPENEM SODIUM 120 MILLIGRAM(S): 1 INJECTION, POWDER, LYOPHILIZED, FOR SOLUTION INTRAMUSCULAR; INTRAVENOUS at 22:13

## 2019-10-03 RX ADMIN — CHLORHEXIDINE GLUCONATE 1 APPLICATION(S): 213 SOLUTION TOPICAL at 11:57

## 2019-10-03 RX ADMIN — MONTELUKAST 10 MILLIGRAM(S): 4 TABLET, CHEWABLE ORAL at 12:01

## 2019-10-03 RX ADMIN — MAGNESIUM OXIDE 400 MG ORAL TABLET 400 MILLIGRAM(S): 241.3 TABLET ORAL at 12:02

## 2019-10-03 RX ADMIN — FAMOTIDINE 20 MILLIGRAM(S): 10 INJECTION INTRAVENOUS at 12:04

## 2019-10-03 RX ADMIN — MAGNESIUM OXIDE 400 MG ORAL TABLET 400 MILLIGRAM(S): 241.3 TABLET ORAL at 10:10

## 2019-10-03 RX ADMIN — MAGNESIUM OXIDE 400 MG ORAL TABLET 400 MILLIGRAM(S): 241.3 TABLET ORAL at 18:16

## 2019-10-03 RX ADMIN — NYSTATIN CREAM 1 APPLICATION(S): 100000 CREAM TOPICAL at 05:26

## 2019-10-03 RX ADMIN — SPIRONOLACTONE 25 MILLIGRAM(S): 25 TABLET, FILM COATED ORAL at 05:25

## 2019-10-03 RX ADMIN — TIOTROPIUM BROMIDE 1 CAPSULE(S): 18 CAPSULE ORAL; RESPIRATORY (INHALATION) at 12:01

## 2019-10-03 RX ADMIN — NYSTATIN CREAM 1 APPLICATION(S): 100000 CREAM TOPICAL at 18:18

## 2019-10-03 RX ADMIN — Medication 5 MILLIGRAM(S): at 05:26

## 2019-10-03 RX ADMIN — ATORVASTATIN CALCIUM 20 MILLIGRAM(S): 80 TABLET, FILM COATED ORAL at 22:10

## 2019-10-03 RX ADMIN — Medication 40 MILLIGRAM(S): at 05:26

## 2019-10-03 NOTE — CONSULT NOTE ADULT - REASON FOR ADMISSION
Possible dislodgement of IR drain, increased purulent output

## 2019-10-03 NOTE — CONSULT NOTE ADULT - SUBJECTIVE AND OBJECTIVE BOX
Interventional Radiology Consult Note    IR consulted for PICC clearance for a patient on apixaban.  Case was discussed with Dr. De and the patient is cleared for PICC placement 24 hours after the last dose of apixaban.    Nicholas Bernal MS3  IR Consult  Spectra [phone] Interventional Radiology Consult Note    IR consulted for PICC clearance for a patient on apixaban.  Case was discussed with Dr. De and the patient is cleared for PICC placement 24 hours after the last dose of apixaban.    Nicholas Bernal MS3  IR Consult  Spectra [phone]    Attending Attestation: OK to place bedside PICC

## 2019-10-03 NOTE — PROGRESS NOTE ADULT - SUBJECTIVE AND OBJECTIVE BOX
Green Team Surgery Progress Note     SUBJECTIVE / 24H EVENTS  Patient seen and examined on morning rounds. No acute events overnight. Her perc tube is in place. Her PICC line was dc'd in error yesterday. She is well overall, tolerating her diet and healing well with minimal abdominal pain. She denies fevers, chills, nausea, or vomiting.     OBJECTIVE:    VITAL SIGNS:  T(C): 36.7 (10-03-19 @ 00:52), Max: 37.1 (10-02-19 @ 13:26)  HR: 104 (10-03-19 @ 02:00) (100 - 114)  BP: 103/63 (10-03-19 @ 00:52) (97/64 - 116/70)  RR: 18 (10-03-19 @ 00:52) (18 - 18)  SpO2: 94% (10-03-19 @ 00:52) (93% - 100%)  Daily     Daily   POCT Blood Glucose.: 173 mg/dL (10-02-19 @ 21:34)  POCT Blood Glucose.: 108 mg/dL (10-02-19 @ 17:25)  POCT Blood Glucose.: 118 mg/dL (10-02-19 @ 13:08)      PHYSICAL EXAM:  Constitutional: NAD, resting comfortable in bed   Respiratory: No increased WOB, on RA  Gastrointestinal: soft, compressible, non-distended, mid abdominal wound clean, to air, draining pouch inferior to wound vac with purulent drainage. Left side IR drain with serosanguinous output, now with attached BASIM bulb, aspirated. Right sided ostomy is pink with stool in bag.   Genitourinary: Primafit in place   Extremities: all four extremities moving spontaneously       10-01-19 @ 07:01  -  10-02-19 @ 07:00  --------------------------------------------------------  IN:    Oral Fluid: 340 mL    Solution: 500 mL    Solution: 50 mL  Total IN: 890 mL    OUT:    Colostomy: 70 mL    Drain: 10 mL    Drain: 10 mL    Voided: 400 mL  Total OUT: 490 mL    Total NET: 400 mL      10-02-19 @ 07:01  -  10-03-19 @ 04:05  --------------------------------------------------------  IN:    Oral Fluid: 580 mL    Solution: 50 mL  Total IN: 630 mL    OUT:    Colostomy: 100 mL    Drain: 25 mL    Drain: 100 mL    Voided: 200 mL  Total OUT: 425 mL    Total NET: 205 mL          LAB VALUES:  10-01    136  |  94<L>  |  8   ----------------------------<  97  3.6   |  31  |  0.46<L>    Ca    7.8<L>      01 Oct 2019 14:56  Phos  1.9     10-02  Mg     1.7     10-01                   MICROBIOLOGY:    No new microbiology data for review.     RADIOLOGY:    No new radiographic images for review.    MEDICATIONS  (STANDING):  atorvastatin 20 milliGRAM(s) Oral at bedtime  chlorhexidine 2% Cloths 1 Application(s) Topical daily  chlorhexidine 4% Liquid 1 Application(s) Topical <User Schedule>  dextrose 5%. 1000 milliLiter(s) (50 mL/Hr) IV Continuous <Continuous>  dextrose 50% Injectable 12.5 Gram(s) IV Push once  dextrose 50% Injectable 25 Gram(s) IV Push once  dextrose 50% Injectable 25 Gram(s) IV Push once  enalapril 5 milliGRAM(s) Oral daily  ertapenem  IVPB 1000 milliGRAM(s) IV Intermittent every 24 hours  famotidine    Tablet 20 milliGRAM(s) Oral daily  furosemide    Tablet 40 milliGRAM(s) Oral daily  insulin lispro (HumaLOG) corrective regimen sliding scale   SubCutaneous Before meals and at bedtime  insulin lispro (HumaLOG) corrective regimen sliding scale   SubCutaneous at bedtime  magnesium oxide 400 milliGRAM(s) Oral three times a day with meals  montelukast 10 milliGRAM(s) Oral daily  nystatin Powder 1 Application(s) Topical two times a day  spironolactone 25 milliGRAM(s) Oral daily  tiotropium 18 MICROgram(s) Capsule 1 Capsule(s) Inhalation daily    MEDICATIONS  (PRN):  ALBUTerol    0.083% 2.5 milliGRAM(s) Nebulizer every 6 hours PRN Shortness of Breath and/or Wheezing  dextrose 40% Gel 15 Gram(s) Oral once PRN Blood Glucose LESS THAN 70 milliGRAM(s)/deciliter  glucagon  Injectable 1 milliGRAM(s) IntraMuscular once PRN Glucose LESS THAN 70 milligrams/deciliter  sodium chloride 0.9% lock flush 10 milliLiter(s) IV Push every 1 hour PRN Pre/post blood products, medications, blood draw, and to maintain line patency

## 2019-10-03 NOTE — PROGRESS NOTE ADULT - ASSESSMENT
73 yo Female with PMH of asthma, A fib, dm, HFrEF, pacemaker/AICD, perforated diverticulitis s/p L hemicolectomy and end colostomy w/ multiple washouts and abdominal wall closure w/ strattice (last surgery on 7/11), presents from [place] due to possible dislodgement of IR drain and increased purulent output from her abdomen. CT scan showing increase in size of L paracolic gutter fluid collection, however drains appear in place. POD 8 s/p IR upsizing, though continued low drain output. Wound vac placed over abdominal wound (9/16/2019). S/p split thickness skin graft on 9/23 with Dr. Perez. Continues draining fistula to pouch.     - PICC line placement today   - per plastics: xerform dressing over abdominal skin flap change every other day, leg dressing with xerform leave on until follow up with Dr. Perez  - Diet: soft diet   - Continue Ertapenem for 28 days per ID   - Will include follow-up with Hematology after discharge for further management.  - dispo planning - return to [place] today      Green Surgery, [id number]

## 2019-10-04 VITALS
RESPIRATION RATE: 18 BRPM | DIASTOLIC BLOOD PRESSURE: 68 MMHG | SYSTOLIC BLOOD PRESSURE: 110 MMHG | TEMPERATURE: 98 F | OXYGEN SATURATION: 94 % | HEART RATE: 103 BPM

## 2019-10-04 PROCEDURE — ZZZZZ: CPT

## 2019-10-04 PROCEDURE — 99238 HOSP IP/OBS DSCHRG MGMT 30/<: CPT | Mod: 24

## 2019-10-04 PROCEDURE — 36573 INSJ PICC RS&I 5 YR+: CPT

## 2019-10-04 RX ORDER — ERTAPENEM SODIUM 1 G/1
1000 INJECTION, POWDER, LYOPHILIZED, FOR SOLUTION INTRAMUSCULAR; INTRAVENOUS
Qty: 0 | Refills: 0 | DISCHARGE
Start: 2019-10-04

## 2019-10-04 RX ORDER — WATER FOR INHALATION
10 VIAL, NEBULIZER (ML) INHALATION
Qty: 30 | Refills: 0
Start: 2019-10-04 | End: 2019-11-02

## 2019-10-04 RX ORDER — CHLORHEXIDINE GLUCONATE 213 G/1000ML
1 SOLUTION TOPICAL
Refills: 0 | Status: DISCONTINUED | OUTPATIENT
Start: 2019-10-04 | End: 2019-10-04

## 2019-10-04 RX ORDER — SODIUM CHLORIDE 9 MG/ML
10 INJECTION INTRAMUSCULAR; INTRAVENOUS; SUBCUTANEOUS
Refills: 0 | Status: DISCONTINUED | OUTPATIENT
Start: 2019-10-04 | End: 2019-10-04

## 2019-10-04 RX ADMIN — Medication 40 MILLIGRAM(S): at 06:28

## 2019-10-04 RX ADMIN — MAGNESIUM OXIDE 400 MG ORAL TABLET 400 MILLIGRAM(S): 241.3 TABLET ORAL at 17:11

## 2019-10-04 RX ADMIN — TIOTROPIUM BROMIDE 1 CAPSULE(S): 18 CAPSULE ORAL; RESPIRATORY (INHALATION) at 11:57

## 2019-10-04 RX ADMIN — NYSTATIN CREAM 1 APPLICATION(S): 100000 CREAM TOPICAL at 17:11

## 2019-10-04 RX ADMIN — MAGNESIUM OXIDE 400 MG ORAL TABLET 400 MILLIGRAM(S): 241.3 TABLET ORAL at 11:56

## 2019-10-04 RX ADMIN — CHLORHEXIDINE GLUCONATE 1 APPLICATION(S): 213 SOLUTION TOPICAL at 11:59

## 2019-10-04 RX ADMIN — CHLORHEXIDINE GLUCONATE 1 APPLICATION(S): 213 SOLUTION TOPICAL at 12:00

## 2019-10-04 RX ADMIN — FAMOTIDINE 20 MILLIGRAM(S): 10 INJECTION INTRAVENOUS at 11:57

## 2019-10-04 RX ADMIN — Medication 5 MILLIGRAM(S): at 06:28

## 2019-10-04 RX ADMIN — NYSTATIN CREAM 1 APPLICATION(S): 100000 CREAM TOPICAL at 06:28

## 2019-10-04 RX ADMIN — MONTELUKAST 10 MILLIGRAM(S): 4 TABLET, CHEWABLE ORAL at 11:57

## 2019-10-04 RX ADMIN — SPIRONOLACTONE 25 MILLIGRAM(S): 25 TABLET, FILM COATED ORAL at 06:28

## 2019-10-04 NOTE — PROCEDURE NOTE - NSPROCDETAILS_GEN_ALL_CORE
sterile dressing applied/supine position/ultrasound guidance/sterile technique, catheter placed/location identified, draped/prepped, sterile technique used/ultrasound assessment

## 2019-10-04 NOTE — PROGRESS NOTE ADULT - ASSESSMENT
73 yo Female with PMH of asthma, A fib, dm, HFrEF, pacemaker/AICD, perforated diverticulitis s/p L hemicolectomy and end colostomy w/ multiple washouts and abdominal wall closure w/ strattice (last surgery on 7/11), presents from [place] due to possible dislodgement of IR drain and increased purulent output from her abdomen. CT scan showing increase in size of L paracolic gutter fluid collection, however drains appear in place. POD 8 s/p IR upsizing, though continued low drain output. Wound vac placed over abdominal wound (9/16/2019). S/p split thickness skin graft on 9/23 with Dr. Perez. Continues draining fistula to pouch.     - PICC line placement today with IR  - per plastics: xerform dressing over abdominal skin flap change every other day, leg dressing with xerform leave on until follow up with Dr. Perez  - Diet: soft diet   - Continue Ertapenem for 28 days per ID   - Will include follow-up with Hematology after discharge for further management  - dispo planning - return to [facility], [phone]

## 2019-10-04 NOTE — PROCEDURE NOTE - ADDITIONAL PROCEDURE DETAILS
S/P RIGHT ARM PICC LINE PLACEMENT, 4F SL 39 CM LENGTH RIGHT ARM PICC LINE PLACED VIA BASILIC VEIN.  POST-PROCEDURE FLUOROSCOPY DEMONSTRATED CATHETER TIP IN THE SVC.  OK TO USE RIGHT ARM PICC LINE IMMEDIATELY.  FLUSHED AND ASPIRATED BLOOD WITHOUT DIFFICULTY. S/P RIGHT ARM PICC LINE PLACEMENT, 4F SL 39 CM LENGTH RIGHT ARM PICC LINE PLACED VIA BASILIC VEIN.  POST-PROCEDURE FLUOROSCOPY DEMONSTRATED CATHETER TIP IN THE SVC.  OK TO USE RIGHT ARM PICC LINE IMMEDIATELY.  FLUSHED AND ASPIRATED BLOOD WITHOUT DIFFICULTY.    pt can restart apixiban in 12 hours as was d/w primary team JANAY Sepulveda.

## 2019-10-04 NOTE — PROGRESS NOTE ADULT - SUBJECTIVE AND OBJECTIVE BOX
Green Team Surgery Progress Note     SUBJECTIVE / 24H EVENTS  Patient seen and examined on morning rounds. No acute events overnight. Her perc tube is in place.   Unable to receive bedside PICC today  IR for PICC placement  She is well overall, tolerating her diet and healing well with minimal abdominal pain. She denies fevers, chills, nausea, or vomiting.     OBJECTIVE:    VITAL SIGNS:  Vital Signs Last 24 Hrs  T(C): 36.7 (04 Oct 2019 00:45), Max: 37 (03 Oct 2019 17:34)  T(F): 98 (04 Oct 2019 00:45), Max: 98.6 (03 Oct 2019 17:34)  HR: 101 (04 Oct 2019 00:45) (101 - 105)  BP: 106/71 (04 Oct 2019 00:45) (101/61 - 118/77)  BP(mean): --  RR: 18 (04 Oct 2019 00:45) (18 - 18)  SpO2: 94% (04 Oct 2019 00:45) (93% - 100%)      PHYSICAL EXAM:  Constitutional: NAD, resting comfortable in bed   Respiratory: No increased WOB, on RA  Gastrointestinal: soft, compressible, non-distended, mid abdominal wound clean, to air, draining pouch inferior to wound vac with purulent drainage. Left side IR drain with serosanguinous output, now with attached BASIM bulb, aspirated. Right sided ostomy is pink with stool in bag.   Genitourinary: Primafit in place   Extremities: all four extremities moving spontaneously         10-02 @ 07:01  -  10-03 @ 07:00  --------------------------------------------------------  IN: 730 mL / OUT: 725 mL / NET: 5 mL    10-03 @ 07:01  -  10-04 @ 04:11  --------------------------------------------------------  IN: 860 mL / OUT: 960 mL / NET: -100 mL          LAB VALUES:      Phos  1.9     10-02      RADIOLOGY:    No new radiographic images for review.    ALBUTerol    0.083% 2.5 milliGRAM(s) every 6 hours PRN  atorvastatin 20 milliGRAM(s) at bedtime  chlorhexidine 2% Cloths 1 Application(s) daily  chlorhexidine 4% Liquid 1 Application(s) <User Schedule>  dextrose 40% Gel 15 Gram(s) once PRN  dextrose 5%. 1000 milliLiter(s) <Continuous>  dextrose 50% Injectable 12.5 Gram(s) once  dextrose 50% Injectable 25 Gram(s) once  dextrose 50% Injectable 25 Gram(s) once  enalapril 5 milliGRAM(s) daily  ertapenem  IVPB 1000 milliGRAM(s) every 24 hours  famotidine    Tablet 20 milliGRAM(s) daily  furosemide    Tablet 40 milliGRAM(s) daily  glucagon  Injectable 1 milliGRAM(s) once PRN  insulin lispro (HumaLOG) corrective regimen sliding scale   Before meals and at bedtime  insulin lispro (HumaLOG) corrective regimen sliding scale   at bedtime  magnesium oxide 400 milliGRAM(s) three times a day with meals  montelukast 10 milliGRAM(s) daily  nystatin Powder 1 Application(s) two times a day  sodium chloride 0.9% lock flush 10 milliLiter(s) every 1 hour PRN  spironolactone 25 milliGRAM(s) daily  tiotropium 18 MICROgram(s) Capsule 1 Capsule(s) daily

## 2019-10-04 NOTE — PROCEDURE NOTE - NSANATOMICLLOCATION_GEN_A_CORE
77 year old male past medical history HTN, hyperlipidemia, Coronary Artery Disease with stents and CABG x3, Bell's Palsy in the past, presents to the ED for slurred speech. Unclear exact onset but sometime between 12-1 when patient arrived and family member's house in [place]. He was noted to have slurred speech and right sided facial droop. Patient reports no pain. No weakness besides face. Code stroke called from triage. right/basilic vein

## 2019-10-04 NOTE — PROGRESS NOTE ADULT - SUBJECTIVE AND OBJECTIVE BOX
Interventional Radiology    74y Female with PMH as below H/O perforated diverticulitis s/p L hemicolectomy and end colostomy w/ multiple washouts and abdominal wall closure w/ strattice (last surgery on 7/11) with h/o initial percutaneous drainage X 2 of left side abdominal fluid collections on 8/16/19 that were last checked 8/21/2019 s/p tube check and upsizing on 9/13.  Pt referred planned for long term IV antibiotics for abdominal fluid collections and is referred to IR for PICC line placement.  Previously placed Right arm PICC line was removed to early.  Pt had a bedside PICC line attempt on 10/3/19 that was unsuccessful.  Eliquis was held and last dose of Eliquis was 10/2/19 6 AM.         PAST MEDICAL & SURGICAL HISTORY:  Refusal of blood transfusions as patient is Hoahaoism  Patient is Hoahaoism  Vertigo  Atrial flutter  Diverticulitis  Cardiomyopathy  Kidney stone  Cardiac Pacemaker  HTN - Hypertension  Gout  Diabetes  Congestive Heart Failure  Asthma  Status post left hemicolectomy  S/P cholecystectomy  AICD (Automatic Cardioverter/Defibrillator) Present: inserted in Aug, 2008. Due for battery change in 1 month. ( [employer]) . Inserted by Dr Duffy    Allergies  Coreg (Other)  digoxin (Other; Short breath (Mild to Mod))  IV Contrast (Unknown)  penicillins (Hives)    Intolerances    metoprolol (Other)  Solu-Medrol (Other (Mild to Mod))    LABS:    Activated Partial Thromboplastin Time in AM (09.23.19 @ 09:42)    Activated Partial Thromboplastin Time: 31.3:     Prothrombin Time and INR, Plasma in AM (09.23.19 @ 09:42)    Prothrombin Time, Plasma: 14.4 sec    INR: 1.27:    Complete Blood Count in AM (09.29.19 @ 08:42)    WBC Count: 9.20 K/uL    RBC Count: 2.70 M/uL    Hemoglobin: 8.0 g/dL    Hematocrit: 26.6 %    Mean Cell Volume: 98.4 fl    Mean Cell Hemoglobin: 29.7 pg    Mean Cell Hemoglobin Conc: 30.2 gm/dL    Red Cell Distrib Width: 19.7 %    Platelet Count - Automated: 201 K/uL    Basic Metabolic Panel - STAT (10.01.19 @ 14:56)    Sodium, Serum: 136 mmol/L    Potassium, Serum: 3.6 mmol/L    Chloride, Serum: 94 mmol/L    Carbon Dioxide, Serum: 31 mmol/L    Anion Gap, Serum: 11 mmol/L    Blood Urea Nitrogen, Serum: 8 mg/dL    Creatinine, Serum: 0.46 mg/dL    Glucose, Serum: 97 mg/dL    Calcium, Total Serum: 7.8 mg/dL    eGFR if Non : 99:     eGFR if African American: 115 mL/min/1.73M2    After risks, benefits, alternatives discussion pt verbalized understanding and signed informed consent.  Will plan for PICC line placement.     Doug Lorenzana RPA-C  [facility] [phone]  Ext [phone]

## 2019-10-04 NOTE — PROGRESS NOTE ADULT - PROVIDER SPECIALTY LIST ADULT
Infectious Disease
Intervent Radiology
Plastic Surgery
Surgery
Plastic Surgery
Surgery
Surgery
Infectious Disease
Plastic Surgery
Surgery

## 2019-10-04 NOTE — PROGRESS NOTE ADULT - REASON FOR ADMISSION
Possible dislodgement of IR drain, increased purulent output

## 2019-10-16 ENCOUNTER — APPOINTMENT (OUTPATIENT)
Dept: ELECTROPHYSIOLOGY | Facility: CLINIC | Age: 74
End: 2019-10-16

## 2019-10-18 ENCOUNTER — OUTPATIENT (OUTPATIENT)
Dept: OUTPATIENT SERVICES | Facility: HOSPITAL | Age: 74
LOS: 1 days | Discharge: HOME | End: 2019-10-18

## 2019-10-18 DIAGNOSIS — Z90.49 ACQUIRED ABSENCE OF OTHER SPECIFIED PARTS OF DIGESTIVE TRACT: Chronic | ICD-10-CM

## 2019-10-18 DIAGNOSIS — D64.9 ANEMIA, UNSPECIFIED: ICD-10-CM

## 2019-10-23 NOTE — PATIENT PROFILE ADULT - DO YOU FEEL LIKE HURTING OTHERS?
=================================================================

Del Sum A-C

=================================================================

Datetime Report Generated by CPN: 10 10:53

   

   

=================================================================

DELIVERY PERSONNEL

=================================================================

   

DELIVERY PERSONNEL:  [id number]

Delivery Doctor::  Sharon Preston MD

Labor and Delivery Nurse::  Chichi Matthews RN

Labor and Delivery Nurse::  Ericka Walters RN

Nursery Nurse::  Ludmila Rodas RN

Scrub Tech/CNA:  Noraher Crowe, ST

   

=================================================================

MATERNAL INFORMATION

=================================================================

   

Delivery Anesthesia:  None

Medications After Delivery:  Pitocin Drip 20 Units/1000ml NSS

Estimated  Blood Loss (ml):  250

Delivery QBL:  250

Delivery QBL Comment:  total 

Maternal Complications:  None

   

=================================================================

LABOR SUMMARY

=================================================================

   

EDC:  10/11/2019 00:00

No. Babies in Womb:  1

 Attempted:  No

Labor Anesthesia:  None

   

=================================================================

LABOR INFORMATION

=================================================================

   

Reason for Induction:  Not Applicable

Onset of Labor:  10/18/2019 18:00

Complete Dilatation:  10/18/2019 22:22

Oxytocin:  N/A

Group B Beta Strep:  negative

Steroids Given:  None

Reason Steroids Not Administered:  Not Applicable

   

=================================================================

MEMBRANES

=================================================================

   

Membranes Rupture Method:  Artificial

Rupture of Membranes:  10/18/2019 22:22

Length of Rupture (hr):  0.95

Amniotic Fluid Color:  Moderate Meconium

Amniotic Fluid Amount:  Large

Amniotic Fluid Odor:  Normal

   

=================================================================

STAGES OF LABOR

=================================================================

   

Stage 1 hr:  4

Stage 1 min:  22

Stage 2 hr:  0

Stage 2 min:  57

Stage 3 hr:  0

Stage 3 min:  8

Total Time in Labor hr:  5

Total Time in Labor min:  27

   

=================================================================

VAGINAL DELIVERY

=================================================================

   

Episiotomy:  None

Laceration #1:  Perineal

Laceration Extension #1:  First Degree

Laceration #2:  None

Laceration Extension #2:  N/A

Laceration #3:  None

Laceration Extension #3:  N/A

Laceration Repair:  Yes

Laceration Repair Note:  small perineal laceration repaired with

   several interrupted 3-0 sutures

Sponge Count Correct:  Vaginal Sweep Performed

Sharps Count Correct:  Yes

   

=================================================================

CSECTION DELIVERY

=================================================================

   

Primary Indication:  N/A

Secondary Indication:  N/A

CSection Incision:  N/A

   

=================================================================

BABY A INFORMATION

=================================================================

   

Infant Delivery Date/Time:  10/18/2019 23:19

Method of Delivery:  Vaginal

Born in Route :  No

:  N/A

Forceps:  N/A

Vacuum Extraction:  N/A

Shoulder Dystocia :  No

   

=================================================================

PRESENTATION/POSITION BABY A

=================================================================

   

Presentation:  Cephalic

Cephalic Presentation:  Vertex

Vertex Position:  Right Occipital Anterior

Breech Presentation:  N/A

   

=================================================================

PLACENTA INFORMATION BABY A

=================================================================

   

Placenta Delivery Time :  10/18/2019 23:27

Placenta Method of Delivery:  Spontaneous

Placenta Status:  Delivered

   

=================================================================

APGAR SCORES BABY A

=================================================================

   

Heart Rate 1 min:  >100 bpm

Resp Effort 1 min:  Good Cry

Reflex Irritability 1 min:  Cough or Sneeze or Pulls Away

Muscle Tone 1 min:  Active Motion

Color 1 min:  Blue/Pale

Resuscitation Effort 1 min:  Tactile Stimulation

APGAR SCORE 1 MIN:  8

Heart Rate 5 min:  >100 bpm

Resp Effort 5 min:  Good Cry

Reflex Irritability 5 min:  Cough or Sneeze or Pulls Away

Muscle Tone 5 min:  Active Motion

Color 5 min:  Body Pink, Extremities Blue

Resuscitation Effort 5 min:  Tactile Stimulation

APGAR SCORE 5 MIN:  9

   

=================================================================

INFANT INFORMATION BABY A

=================================================================

   

Gestational Age at Delivery:  41.0

Gestational Status:  Late Term-  41- 41.6 Weeks

Infant Outcome :  Liveborn

Infant Condition :  Stable

Infant Sex:  Female

   

=================================================================

IDENTIFICATION BABY A

=================================================================

   

Infant Verification Date/Time:  10/18/2019 23:39

ID Band Number:  [id number]

Mother's Name Verified:  Yes

Infant Medical Record Number:  [id number]

RN Verifying Infant:  rn david and rn linda

   

=================================================================

WEIGHT/LENGTH BABY A

=================================================================

   

Infant Birthweight (gm):  3864

Infant Weight (lb):  8

Infant Weight (oz):  8

Infant Length (in):  21.00

Infant Length (cm):  53.34

   

=================================================================

CORD INFORMATION BABY A

=================================================================

   

No. Cord Vessels:  3

Nuchal Cord :  N/A

Cord Blood Taken:  Yes-For Storage (Mom's Blood type +)

Infant Suction:  Mouth

   

=================================================================

ASSESSMENT BABY A

=================================================================

   

Infant Complications:  None

Physical Findings at Delivery:  Within Normal Limits

Infant Respirations:  Appears Normal

Skin to Skin:  Yes

Skin to Skin:  Yes

Neonatologist/ALS Called :  No

Transferred To:  Remains with Mother

   

=================================================================

BABY B INFORMATION

=================================================================

   

 :  N/A

   

=================================================================

SIGNATURES

=================================================================

   

Signature:  Electronically signed by MD Aubrey [email or web address]) on

   10/18/2019 at 23:37  with User ID: [place]
no

## 2019-10-24 ENCOUNTER — APPOINTMENT (OUTPATIENT)
Dept: CT IMAGING | Facility: HOSPITAL | Age: 74
End: 2019-10-24
Payer: MEDICARE

## 2019-10-24 ENCOUNTER — OUTPATIENT (OUTPATIENT)
Dept: OUTPATIENT SERVICES | Facility: HOSPITAL | Age: 74
LOS: 1 days | End: 2019-10-24

## 2019-10-24 DIAGNOSIS — K65.1 PERITONEAL ABSCESS: ICD-10-CM

## 2019-10-24 DIAGNOSIS — Z90.49 ACQUIRED ABSENCE OF OTHER SPECIFIED PARTS OF DIGESTIVE TRACT: Chronic | ICD-10-CM

## 2019-10-24 PROCEDURE — 74176 CT ABD & PELVIS W/O CONTRAST: CPT | Mod: 26

## 2019-10-25 ENCOUNTER — APPOINTMENT (OUTPATIENT)
Dept: SURGERY | Facility: CLINIC | Age: 74
End: 2019-10-25
Payer: MEDICARE

## 2019-10-25 VITALS
RESPIRATION RATE: 15 BRPM | OXYGEN SATURATION: 98 % | DIASTOLIC BLOOD PRESSURE: 63 MMHG | TEMPERATURE: 98.5 F | HEART RATE: 110 BPM | SYSTOLIC BLOOD PRESSURE: 97 MMHG

## 2019-10-25 PROBLEM — T81.49XA ABSCESS, INTRA-ABDOMINAL, POSTOPERATIVE: Status: ACTIVE | Noted: 2019-10-25

## 2019-10-25 PROCEDURE — 99213 OFFICE O/P EST LOW 20 MIN: CPT

## 2019-10-25 RX ORDER — BUDESONIDE AND FORMOTEROL FUMARATE DIHYDRATE 160; 4.5 UG/1; UG/1
160-4.5 AEROSOL RESPIRATORY (INHALATION)
Refills: 0 | Status: DISCONTINUED | COMMUNITY
Start: 2018-01-03 | End: 2019-10-25

## 2019-10-25 RX ORDER — ASPIRIN ENTERIC COATED TABLETS 81 MG 81 MG/1
81 TABLET, DELAYED RELEASE ORAL
Qty: 60 | Refills: 0 | Status: DISCONTINUED | COMMUNITY
Start: 2017-03-27 | End: 2019-10-25

## 2019-10-25 RX ORDER — POTASSIUM CHLORIDE 20 MEQ/15ML
20 MEQ/15ML SOLUTION ORAL
Qty: 150 | Refills: 0 | Status: DISCONTINUED | COMMUNITY
Start: 2017-06-09 | End: 2019-10-25

## 2019-10-25 RX ORDER — OMEPRAZOLE 40 MG/1
40 CAPSULE, DELAYED RELEASE ORAL
Qty: 30 | Refills: 0 | Status: DISCONTINUED | COMMUNITY
Start: 2018-05-08 | End: 2019-10-25

## 2019-10-25 RX ORDER — ALBUTEROL SULFATE 90 UG/1
108 (90 BASE) AEROSOL, METERED RESPIRATORY (INHALATION)
Qty: 18 | Refills: 0 | Status: DISCONTINUED | COMMUNITY
Start: 2017-10-07 | End: 2019-10-25

## 2019-10-25 NOTE — HISTORY OF PRESENT ILLNESS
[de-identified] : Pretty is a 75 y/o female here for post op visit. \par Briefly, underwent ex-lap and Lupe's procedure for perforated diverticulitis in July with abdomen left open, multiple washouts, then abdominal closure with vicryl mesh (fascia left open) and skin graft over wound.  She subsequently developed a sigmoid stump leak, with left-sided abdominal fluid collection which was drained and fistula to inferior pole of wound, which is very well contained.\par \par She comes in today for f/u from nursing facility accompanied by daughter.  Has no complaints, says appetite is reasonable, tolerating PO, no malaise.  No pain reported.  Getting IV abx via RUE PICC.\par \par Drainage at inferior pole of wound as well as from BASIM adame is decreased.  Ostomy is functioning.

## 2019-10-25 NOTE — ASSESSMENT
[FreeTextEntry1] : 74F s/p ex-lap and Lupe's procedure for perforated diverticulitis in July with abdomen left open, multiple washouts, then abdominal closure with vicryl mesh (fascia left open) and skin graft over wound.  She subsequently developed a sigmoid stump leak, with left-sided abdominal fluid collection which was drained and fistula to inferior pole of wound, which is very well contained.  \par \par She is healing well.\par \par CT reviewed, showing smaller size of abdominal fluid collection.\par \par

## 2019-10-25 NOTE — REASON FOR VISIT
[FreeTextEntry1] : Exploratory laparotomy, partial colectomy, end colostomy, and placement of temporary abdominal VAC dressing.

## 2019-10-25 NOTE — PHYSICAL EXAM
[de-identified] : sclerae anicteric, mucous membranes moist, normocephalic, trachea midline  [de-identified] : resting comfortably in stretcher, NAD [de-identified] : normal respirations and chest expansion  [de-identified] : Abd soft, nontender, nondistended\par Right-sided colostomy is pink and healthy with stool in bag.\par Left-sided drain with scant purulent fluid.\par Midline wound very well healed.\par Inferior pole with healthy pink granulation and scan purulent drainage in bag isolating that portion.\par \par  [de-identified] : RLE graft donor site pink, granulating well.  RUE PICC site clean and dry without erythema.

## 2019-10-25 NOTE — PLAN
[FreeTextEntry1] : [] repeat CT A/P with rectal contrast to evaluate fistula in 1 month\par [] IV abx as per Dr. Goode -- pt to see for f/u in 2 weeks\par [] wound well-healed and patient has seen Dr. Perez of plastic surgery\par [] nursing home instructed to flush drain with 10mL sterile saline once per day\par [] f/u with me in1 month after CT scan

## 2019-10-29 ENCOUNTER — APPOINTMENT (OUTPATIENT)
Dept: INFECTIOUS DISEASE | Facility: CLINIC | Age: 74
End: 2019-10-29
Payer: MEDICARE

## 2019-10-29 VITALS
HEART RATE: 80 BPM | SYSTOLIC BLOOD PRESSURE: 106 MMHG | RESPIRATION RATE: 18 BRPM | TEMPERATURE: 98 F | OXYGEN SATURATION: 96 % | DIASTOLIC BLOOD PRESSURE: 60 MMHG

## 2019-10-29 DIAGNOSIS — R05 COUGH: ICD-10-CM

## 2019-10-29 PROCEDURE — 99214 OFFICE O/P EST MOD 30 MIN: CPT

## 2019-10-29 NOTE — HISTORY OF PRESENT ILLNESS
[FreeTextEntry1] : 74 year old female PMH of HFrEF, s/p AICD, asthma, HTN, HLD, atrial fibrillation on eliquis, with previous perforated diverticulosis, Hartmans procedure with multiple RTOR for abd washouts. Admitted 6/30 - 8/2 for feculent peritonitis with septic shock secondary to perforated diverticulitis Readmitted to [facility] most recently on  admitted 9/13/19 - 10/4/19.\par \par CT with CT A/P (9/12)  with Left hemicolectomy with right lower quadrant colostomy and ventral abdominal wall dehiscence. Slight interval enlargement of the left paracolic gutter fluid collection measuring up to 3 x 3.9 x 10.2 cm (TR x AP x CC) s/p IR upsize of drain 9/13/19.  Repeat bacterial cultures from drainage with ESBL E coli and enterococcus S amp. Repeat CT A/P (9/20) with continued extravasation of contrast to flank collection and skin (continued rectal stump leak). s/p Skin Graft to Abdomen 9/23.\par \par [facility] Daughter accompanying patient to visit today. Notes decreased drainage from anterior abdominal wall defect and decrease in size of defect. Continued minimal drainage from IR drain. No chills or fevers. Patient with continued cough with new rhinorrhea. \par \par

## 2019-10-29 NOTE — REVIEW OF SYSTEMS
[Shortness Of Breath] : no shortness of breath [Wheezing] : no wheezing [Cough] : cough [SOB on Exertion] : no shortness of breath during exertion [Sputum] : not coughing up ~M sputum [Pleuritic Chest Pain] : no pleuritic chest pain [Abdominal Pain] : abdominal pain [Vomiting] : no vomiting [Constipation] : no constipation [Diarrhea] : no diarrhea [Negative] : Heme/Lymph [FreeTextEntry7] : +drainage from midline wound

## 2019-10-29 NOTE — PHYSICAL EXAM
[General Appearance - Alert] : alert [General Appearance - In No Acute Distress] : in no acute distress [Sclera] : the sclera and conjunctiva were normal [PERRL With Normal Accommodation] : pupils were equal in size, round, reactive to light [Extraocular Movements] : extraocular movements were intact [Outer Ear] : the ears and nose were normal in appearance [Oropharynx] : the oropharynx was normal with no thrush [Neck Appearance] : the appearance of the neck was normal [Neck Cervical Mass (___cm)] : no neck mass was observed [Jugular Venous Distention Increased] : there was no jugular-venous distention [Thyroid Diffuse Enlargement] : the thyroid was not enlarged [Auscultation Breath Sounds / Voice Sounds] : lungs were clear to auscultation bilaterally [Heart Rate And Rhythm] : heart rate was normal and rhythm regular [Heart Sounds] : normal S1 and S2 [Heart Sounds Gallop] : no gallops [Murmurs] : no murmurs [Heart Sounds Pericardial Friction Rub] : no pericardial rub [Edema] : there was no peripheral edema [Bowel Sounds] : normal bowel sounds [Abdomen Soft] : soft [] : no hepato-splenomegaly [FreeTextEntry1] : +RUQ Ostomy with liquid stool output. Lower midline ostomy over abdominal wall defect with purulent drainage. LUQ drain with purulent drainage into bulb [No Palpable Adenopathy] : no palpable adenopathy [Nail Clubbing] : no clubbing  or cyanosis of the fingernails [Motor Tone] : muscle strength and tone were normal [Skin Lesions] : no skin lesions [No Focal Deficits] : no focal deficits [Affect] : the affect was normal

## 2019-10-29 NOTE — ASSESSMENT
[FreeTextEntry1] : 74 year old female PMH of HFrEF, s/p AICD, asthma, HTN, HLD, atrial fibrillation on eliquis, with previous perforated diverticulosis, Hartmans procedure with multiple RTOR for abd washouts. Admitted 6/30 - 8/2 for feculent peritonitis with septic shock secondary to perforated diverticulitis Readmitted to [facility] most recently on  admitted 9/13/19 - 10/4/19. CT with CT A/P (9/12)  with Left hemicolectomy with right lower quadrant colostomy and ventral abdominal wall dehiscence. Slight interval enlargement of the left paracolic gutter fluid collection measuring up to 3 x 3.9 x 10.2 cm (TR x AP x CC) s/p IR upsize of drain 9/13/19.  Repeat bacterial cultures from drainage with ESBL E coli and enterococcus S amp. Repeat CT A/P (9/20) with continued extravasation of contrast to flank collection and skin (continued rectal stump leak). s/p Skin Graft to Abdomen 9/23.\par \par CT A/P (done without PO/DE - thus limited evaluation of fistula to abscess) on 10/24 with interval decrease in collection. I plan to continue Ertapenem at this time. I would repeat CT A/P with PO/DE contrast in 2-3 weeks to see if fistula to abscess is resolved. \par \par Will obtain RVP given patient's cough and rhinorrhea and slight increase in WBC count from yesterday\par \par --Continue Ertapenem 1g IV Q24H\par --Continue weekly CBC with diff and CMP\par --Check RVP\par --Check CT A/P in 2-3 weeks with PO and DE contrast\par \par Followup: 1 month

## 2019-10-30 LAB — RAPID RVP RESULT: NOT DETECTED

## 2019-11-01 ENCOUNTER — APPOINTMENT (OUTPATIENT)
Dept: HEMATOLOGY ONCOLOGY | Facility: CLINIC | Age: 74
End: 2019-11-01
Payer: MEDICARE

## 2019-11-01 ENCOUNTER — RESULT REVIEW (OUTPATIENT)
Age: 74
End: 2019-11-01

## 2019-11-01 VITALS
TEMPERATURE: 97.4 F | OXYGEN SATURATION: 94 % | DIASTOLIC BLOOD PRESSURE: 71 MMHG | RESPIRATION RATE: 18 BRPM | SYSTOLIC BLOOD PRESSURE: 127 MMHG | HEART RATE: 62 BPM

## 2019-11-01 DIAGNOSIS — Z86.79 PERSONAL HISTORY OF OTHER DISEASES OF THE CIRCULATORY SYSTEM: ICD-10-CM

## 2019-11-01 DIAGNOSIS — Z86.39 PERSONAL HISTORY OF OTHER ENDOCRINE, NUTRITIONAL AND METABOLIC DISEASE: ICD-10-CM

## 2019-11-01 DIAGNOSIS — Z87.19 PERSONAL HISTORY OF OTHER DISEASES OF THE DIGESTIVE SYSTEM: ICD-10-CM

## 2019-11-01 DIAGNOSIS — Z87.09 PERSONAL HISTORY OF OTHER DISEASES OF THE RESPIRATORY SYSTEM: ICD-10-CM

## 2019-11-01 DIAGNOSIS — D64.9 ANEMIA, UNSPECIFIED: ICD-10-CM

## 2019-11-01 DIAGNOSIS — Z87.448 PERSONAL HISTORY OF OTHER DISEASES OF URINARY SYSTEM: ICD-10-CM

## 2019-11-01 LAB
BASOPHILS # BLD AUTO: 0 K/UL — SIGNIFICANT CHANGE UP (ref 0–0.2)
BASOPHILS NFR BLD AUTO: 0.2 % — SIGNIFICANT CHANGE UP (ref 0–2)
EOSINOPHIL # BLD AUTO: 0.3 K/UL — SIGNIFICANT CHANGE UP (ref 0–0.5)
EOSINOPHIL NFR BLD AUTO: 3.4 % — SIGNIFICANT CHANGE UP (ref 0–6)
ERYTHROCYTE [SEDIMENTATION RATE] IN BLOOD: 115 MM/HR — HIGH (ref 0–20)
HCT VFR BLD CALC: 26.7 % — LOW (ref 34.5–45)
HGB BLD-MCNC: 8.7 G/DL — LOW (ref 11.5–15.5)
LYMPHOCYTES # BLD AUTO: 2.1 K/UL — SIGNIFICANT CHANGE UP (ref 1–3.3)
LYMPHOCYTES # BLD AUTO: 21.1 % — SIGNIFICANT CHANGE UP (ref 13–44)
MCHC RBC-ENTMCNC: 32.5 G/DL — SIGNIFICANT CHANGE UP (ref 32–36)
MCHC RBC-ENTMCNC: 33.5 PG — SIGNIFICANT CHANGE UP (ref 27–34)
MCV RBC AUTO: 103 FL — HIGH (ref 80–100)
MONOCYTES # BLD AUTO: 1 K/UL — HIGH (ref 0–0.9)
MONOCYTES NFR BLD AUTO: 9.6 % — SIGNIFICANT CHANGE UP (ref 2–14)
NEUTROPHILS # BLD AUTO: 6.5 K/UL — SIGNIFICANT CHANGE UP (ref 1.8–7.4)
NEUTROPHILS NFR BLD AUTO: 65.6 % — SIGNIFICANT CHANGE UP (ref 43–77)
PLATELET # BLD AUTO: 286 K/UL — SIGNIFICANT CHANGE UP (ref 150–400)
RBC # BLD: 2.59 M/UL — LOW (ref 3.8–5.2)
RBC # FLD: 18.2 % — HIGH (ref 10.3–14.5)
WBC # BLD: 10 K/UL — SIGNIFICANT CHANGE UP (ref 3.8–10.5)
WBC # FLD AUTO: 10 K/UL — SIGNIFICANT CHANGE UP (ref 3.8–10.5)

## 2019-11-01 PROCEDURE — 99214 OFFICE O/P EST MOD 30 MIN: CPT

## 2019-11-01 NOTE — CONSULT LETTER
[Dear  ___] : Dear  [unfilled], [Consult Letter:] : I had the pleasure of evaluating your patient, [unfilled]. [Please see my note below.] : Please see my note below. [Consult Closing:] : Thank you very much for allowing me to participate in the care of this patient.  If you have any questions, please do not hesitate to contact me. [Sincerely,] : Sincerely, [FreeTextEntry2] : Dr Kirill Daily

## 2019-11-01 NOTE — ASSESSMENT
[FreeTextEntry1] : \par Ms Dolan is a 74 year old woman with PMHx of  congestive heart failure,  asthma since childhood, , diabetes ,diverticulosis and HTN.Referred for evaluation of anemia. 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 Patient is at [facility] getting rehabilitation. \par \par Ex-lap and Lupe's procedure for perforated diverticulitis in July, 2019 with abdomen left open, multiple washouts, then abdominal closure with vicryl mesh (fascia left open) and skin graft over wound. She subsequently developed a sigmoid stump leak, with left-sided abdominal fluid collection which was drained and fistula to inferior pole of wound. \par \par Labs 9/29/19: WBC 9.2, Hb 8, Hct 26.6, PLTs 201. RDW 19.7. \par \par Patient complaints of feeling very tired, weak, unable to walk, denies  dizziness or lightheadedness. Positive for sob with exertion, has history of asthma and COPD. Denies fevers, night sweats, positive for weight loss approx 40 pounds / 4 months. \par Patient was previously treated with Epogen in 7/2019 with moderate improvement of her symptoms. \par \par I had a detailed discussion today with the patient  and daughter regarding the natural history, epidemiology, diagnosis and treatment of iron deficiency anemia . I reviewed her laboratory studies in detail today. I then discussed treatment with Ferrous sulfate 325 mg daily . In addition, will consider intermittent Epogen 10,000 SQ every week for Hb below 9 g/dl.  to maintain Hb above 8-9 g/dl. I reviewed with patient the benefits versus risks of therapy. I answered all their  questions to satisfaction.\par \par Greater than 50% of the encounter time was spent on counseling and coordination of care for anemia     and I have spent  45   minutes of face to face time with the patient.\par \par RTC 3 months. \par \par \par

## 2019-11-01 NOTE — PHYSICAL EXAM
[Normal] : affect appropriate [Capable of only limited self care, confined to bed or chair more than 50% of waking hours] : Status 3- Capable of only limited self care, confined to bed or chair more than 50% of waking hours [de-identified] : colostomy in the right side of the abdomen, open wound in the mid abdominal area, positive for a left drain. No organomegaly.

## 2019-11-01 NOTE — HISTORY OF PRESENT ILLNESS
[0 - No Distress] : Distress Level: 0 [de-identified] : \par Ms Dolan is a 74 year old woman with PMHx of  congestive heart failure,  asthma since childhood, , diabetes ,diverticulosis and HTN.Referred for evaluation of anemia. 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 Patient is at [facility] getting rehabilitation. \par \par Ex-lap and Lupe's procedure for perforated diverticulitis in July, 2019 with abdomen left open, multiple washouts, then abdominal closure with vicryl mesh (fascia left open) and skin graft over wound. She subsequently developed a sigmoid stump leak, with left-sided abdominal fluid collection which was drained and fistula to inferior pole of wound. \par \par Labs 9/29/19: WBC 9.2, Hb 8, Hct 26.6, PLTs 201. RDW 19.7. \par \par Patient complaints of feeling very tired, weak, unable to walk, denies  dizziness or lightheadedness. Positive with sob with exertion, has history of asthma and COPD. \par Patient was previously treated with Epogen in 7/2019 with moderate improvement of her symptoms.  Denies fevers, night sweats, positive for weight loss approx 40 pounds / 4 months.

## 2019-11-02 LAB
ANION GAP SERPL CALC-SCNC: 13 MMOL/L
BUN SERPL-MCNC: 18 MG/DL
CALCIUM SERPL-MCNC: 8.4 MG/DL
CHLORIDE SERPL-SCNC: 100 MMOL/L
CO2 SERPL-SCNC: 24 MMOL/L
CREAT SERPL-MCNC: 0.52 MG/DL
CRP SERPL-MCNC: 3.87 MG/DL
DEPRECATED KAPPA LC FREE/LAMBDA SER: 0.92 RATIO
FERRITIN SERPL-MCNC: 914 NG/ML
FOLATE SERPL-MCNC: 6.5 NG/ML
GLUCOSE SERPL-MCNC: 102 MG/DL
HAV IGM SER QL: NONREACTIVE
HAV IGM SER QL: NONREACTIVE
HBV CORE IGG+IGM SER QL: NONREACTIVE
HBV CORE IGM SER QL: NONREACTIVE
HBV CORE IGM SER QL: NONREACTIVE
HBV SURFACE AB SER QL: NONREACTIVE
HBV SURFACE AG SER QL: NONREACTIVE
HBV SURFACE AG SER QL: NONREACTIVE
HCV AB SER QL: NONREACTIVE
HCV S/CO RATIO: 0.17 S/CO
IRON SATN MFR SERPL: 24 %
IRON SERPL-MCNC: 35 UG/DL
KAPPA LC CSF-MCNC: 4.95 MG/DL
KAPPA LC SERPL-MCNC: 4.55 MG/DL
LDH SERPL-CCNC: 232 U/L
POTASSIUM SERPL-SCNC: 4.1 MMOL/L
RETICS #: 125 K/UL — SIGNIFICANT CHANGE UP (ref 25–125)
RETICS/RBC NFR: 5.2 % — HIGH (ref 0.5–2.5)
SODIUM SERPL-SCNC: 137 MMOL/L
TIBC SERPL-MCNC: 145 UG/DL
UIBC SERPL-MCNC: 110 UG/DL
VIT B12 SERPL-MCNC: 1516 PG/ML

## 2019-11-04 LAB
HGB A MFR BLD: 96.6 %
HGB A2 MFR BLD: 2.4 %
HGB F MFR BLD: 1 %
HGB FRACT BLD-IMP: NORMAL

## 2019-11-07 PROBLEM — D64.9 ANEMIA: Status: ACTIVE | Noted: 2019-11-01

## 2019-11-07 LAB — M PROTEIN SPEC IFE-MCNC: NORMAL

## 2019-11-08 VITALS
RESPIRATION RATE: 18 BRPM | TEMPERATURE: 98 F | OXYGEN SATURATION: 100 % | SYSTOLIC BLOOD PRESSURE: 104 MMHG | HEART RATE: 106 BPM | DIASTOLIC BLOOD PRESSURE: 68 MMHG

## 2019-11-08 LAB
ALBUMIN SERPL ELPH-MCNC: 2.5 G/DL — LOW (ref 3.3–5)
ALP SERPL-CCNC: 112 U/L — SIGNIFICANT CHANGE UP (ref 40–120)
ALT FLD-CCNC: 7 U/L — SIGNIFICANT CHANGE UP (ref 4–33)
ANION GAP SERPL CALC-SCNC: 12 MMO/L — SIGNIFICANT CHANGE UP (ref 7–14)
APPEARANCE UR: SIGNIFICANT CHANGE UP
AST SERPL-CCNC: 15 U/L — SIGNIFICANT CHANGE UP (ref 4–32)
BACTERIA # UR AUTO: SIGNIFICANT CHANGE UP
BASE EXCESS BLDV CALC-SCNC: 2.1 MMOL/L — SIGNIFICANT CHANGE UP
BASOPHILS # BLD AUTO: 0.06 K/UL — SIGNIFICANT CHANGE UP (ref 0–0.2)
BASOPHILS NFR BLD AUTO: 0.5 % — SIGNIFICANT CHANGE UP (ref 0–2)
BILIRUB SERPL-MCNC: < 0.2 MG/DL — LOW (ref 0.2–1.2)
BILIRUB UR-MCNC: NEGATIVE — SIGNIFICANT CHANGE UP
BLOOD GAS VENOUS - CREATININE: 0.43 MG/DL — LOW (ref 0.5–1.3)
BLOOD UR QL VISUAL: HIGH
BUN SERPL-MCNC: 20 MG/DL — SIGNIFICANT CHANGE UP (ref 7–23)
CALCIUM SERPL-MCNC: 8.4 MG/DL — SIGNIFICANT CHANGE UP (ref 8.4–10.5)
CHLORIDE BLDV-SCNC: 105 MMOL/L — SIGNIFICANT CHANGE UP (ref 96–108)
CHLORIDE SERPL-SCNC: 100 MMOL/L — SIGNIFICANT CHANGE UP (ref 98–107)
CO2 SERPL-SCNC: 25 MMOL/L — SIGNIFICANT CHANGE UP (ref 22–31)
COD CRY URNS QL: SIGNIFICANT CHANGE UP (ref 0–0)
COLOR SPEC: YELLOW — SIGNIFICANT CHANGE UP
CREAT SERPL-MCNC: 0.47 MG/DL — LOW (ref 0.5–1.3)
EOSINOPHIL # BLD AUTO: 0.35 K/UL — SIGNIFICANT CHANGE UP (ref 0–0.5)
EOSINOPHIL NFR BLD AUTO: 3 % — SIGNIFICANT CHANGE UP (ref 0–6)
GAS PNL BLDV: 136 MMOL/L — SIGNIFICANT CHANGE UP (ref 136–146)
GLUCOSE BLDV-MCNC: 115 MG/DL — HIGH (ref 70–99)
GLUCOSE SERPL-MCNC: 125 MG/DL — HIGH (ref 70–99)
GLUCOSE UR-MCNC: NEGATIVE — SIGNIFICANT CHANGE UP
HCO3 BLDV-SCNC: 26 MMOL/L — SIGNIFICANT CHANGE UP (ref 20–27)
HCT VFR BLD CALC: 25.7 % — LOW (ref 34.5–45)
HCT VFR BLDV CALC: 26.5 % — LOW (ref 34.5–45)
HGB BLD-MCNC: 8.3 G/DL — LOW (ref 11.5–15.5)
HGB BLDV-MCNC: 8.5 G/DL — LOW (ref 11.5–15.5)
HYALINE CASTS # UR AUTO: HIGH
IMM GRANULOCYTES NFR BLD AUTO: 0.4 % — SIGNIFICANT CHANGE UP (ref 0–1.5)
KETONES UR-MCNC: NEGATIVE — SIGNIFICANT CHANGE UP
LACTATE BLDV-MCNC: 1.7 MMOL/L — SIGNIFICANT CHANGE UP (ref 0.5–2)
LEUKOCYTE ESTERASE UR-ACNC: SIGNIFICANT CHANGE UP
LYMPHOCYTES # BLD AUTO: 17.5 % — SIGNIFICANT CHANGE UP (ref 13–44)
LYMPHOCYTES # BLD AUTO: 2.05 K/UL — SIGNIFICANT CHANGE UP (ref 1–3.3)
MCHC RBC-ENTMCNC: 32.3 % — SIGNIFICANT CHANGE UP (ref 32–36)
MCHC RBC-ENTMCNC: 33.1 PG — SIGNIFICANT CHANGE UP (ref 27–34)
MCV RBC AUTO: 102.4 FL — HIGH (ref 80–100)
MONOCYTES # BLD AUTO: 1.01 K/UL — HIGH (ref 0–0.9)
MONOCYTES NFR BLD AUTO: 8.6 % — SIGNIFICANT CHANGE UP (ref 2–14)
NEUTROPHILS # BLD AUTO: 8.17 K/UL — HIGH (ref 1.8–7.4)
NEUTROPHILS NFR BLD AUTO: 70 % — SIGNIFICANT CHANGE UP (ref 43–77)
NITRITE UR-MCNC: NEGATIVE — SIGNIFICANT CHANGE UP
NRBC # FLD: 0 K/UL — SIGNIFICANT CHANGE UP (ref 0–0)
PCO2 BLDV: 44 MMHG — SIGNIFICANT CHANGE UP (ref 41–51)
PH BLDV: 7.4 PH — SIGNIFICANT CHANGE UP (ref 7.32–7.43)
PH UR: 7 — SIGNIFICANT CHANGE UP (ref 5–8)
PLATELET # BLD AUTO: 284 K/UL — SIGNIFICANT CHANGE UP (ref 150–400)
PMV BLD: 9.9 FL — SIGNIFICANT CHANGE UP (ref 7–13)
PO2 BLDV: 41 MMHG — HIGH (ref 35–40)
POTASSIUM BLDV-SCNC: 3.7 MMOL/L — SIGNIFICANT CHANGE UP (ref 3.4–4.5)
POTASSIUM SERPL-MCNC: 3.8 MMOL/L — SIGNIFICANT CHANGE UP (ref 3.5–5.3)
POTASSIUM SERPL-SCNC: 3.8 MMOL/L — SIGNIFICANT CHANGE UP (ref 3.5–5.3)
PROT SERPL-MCNC: 6.3 G/DL — SIGNIFICANT CHANGE UP (ref 6–8.3)
PROT UR-MCNC: 20 — SIGNIFICANT CHANGE UP
RBC # BLD: 2.51 M/UL — LOW (ref 3.8–5.2)
RBC # FLD: 17.8 % — HIGH (ref 10.3–14.5)
RBC CASTS # UR COMP ASSIST: >50 — HIGH (ref 0–?)
SAO2 % BLDV: 63.3 % — SIGNIFICANT CHANGE UP (ref 60–85)
SODIUM SERPL-SCNC: 137 MMOL/L — SIGNIFICANT CHANGE UP (ref 135–145)
SP GR SPEC: 1.02 — SIGNIFICANT CHANGE UP (ref 1–1.04)
SQUAMOUS # UR AUTO: SIGNIFICANT CHANGE UP
UROBILINOGEN FLD QL: NORMAL — SIGNIFICANT CHANGE UP
WBC # BLD: 11.69 K/UL — HIGH (ref 3.8–10.5)
WBC # FLD AUTO: 11.69 K/UL — HIGH (ref 3.8–10.5)
WBC UR QL: >50 — HIGH (ref 0–?)
YEAST FLD HPF-#/AREA: SIGNIFICANT CHANGE UP

## 2019-11-08 PROCEDURE — 74177 CT ABD & PELVIS W/CONTRAST: CPT | Mod: 26

## 2019-11-08 RX ORDER — HYDROCORTISONE 20 MG
100 TABLET ORAL ONCE
Refills: 0 | Status: COMPLETED | OUTPATIENT
Start: 2019-11-08 | End: 2019-11-08

## 2019-11-08 RX ORDER — MORPHINE SULFATE 50 MG/1
4 CAPSULE, EXTENDED RELEASE ORAL ONCE
Refills: 0 | Status: DISCONTINUED | OUTPATIENT
Start: 2019-11-08 | End: 2019-11-08

## 2019-11-08 RX ORDER — SODIUM CHLORIDE 9 MG/ML
1000 INJECTION INTRAMUSCULAR; INTRAVENOUS; SUBCUTANEOUS ONCE
Refills: 0 | Status: COMPLETED | OUTPATIENT
Start: 2019-11-08 | End: 2019-11-08

## 2019-11-08 RX ORDER — ACETAMINOPHEN 500 MG
650 TABLET ORAL ONCE
Refills: 0 | Status: COMPLETED | OUTPATIENT
Start: 2019-11-08 | End: 2019-11-08

## 2019-11-08 RX ADMIN — Medication 100 MILLIGRAM(S): at 19:44

## 2019-11-08 RX ADMIN — SODIUM CHLORIDE 1000 MILLILITER(S): 9 INJECTION INTRAMUSCULAR; INTRAVENOUS; SUBCUTANEOUS at 19:40

## 2019-11-08 NOTE — ED CLERICAL - NS ED CLERK NOTE PRE-ARRIVAL INFORMATION; ADDITIONAL PRE-ARRIVAL INFORMATION
Pain at BASIM drain site.  S/P perforated diverticulum, colostomy.  10/4 to [facility] for rehab.  Hx HTN, hyperlipidemia, CAD, asthma, AF, gout.

## 2019-11-08 NOTE — ED PROVIDER NOTE - ATTENDING CONTRIBUTION TO CARE
Attending note:   After face to face evaluation of this patient, I concur with above noted hx, pe, and care plan for this patient.  75 y/o F s/p perforated tic with chronic intra-abdominal infection with fistula and gurdeep drain with increasing pain and induration under gurdeep drain.    Patient on iv ertapenem.   Evaluation in progress

## 2019-11-08 NOTE — ED ADULT TRIAGE NOTE - CHIEF COMPLAINT QUOTE
Patient brought to ER by EMS from [facility] for pain to BASIM drain. Pt went to [place] on 10/4 s/p perf diverticulum, colostomy

## 2019-11-08 NOTE — ED ADULT NURSE NOTE - CHPI ED NUR SYMPTOMS NEG
no diarrhea/no burning urination/no chills/no fever/no abdominal distension/no dysuria/no hematuria/no nausea/no vomiting

## 2019-11-08 NOTE — ED ADULT NURSE NOTE - NSIMPLEMENTINTERV_GEN_ALL_ED
Implemented All Fall with Harm Risk Interventions:  [place] to call system. Call bell, personal items and telephone within reach. Instruct patient to call for assistance. Room bathroom lighting operational. Non-slip footwear when patient is off stretcher. Physically safe environment: no spills, clutter or unnecessary equipment. Stretcher in lowest position, wheels locked, appropriate side rails in place. Provide visual cue, wrist band, yellow gown, etc. Monitor gait and stability. Monitor for mental status changes and reorient to person, place, and time. Review medications for side effects contributing to fall risk. Reinforce activity limits and safety measures with patient and family. Provide visual clues: red socks.

## 2019-11-08 NOTE — ED ADULT NURSE NOTE - CHIEF COMPLAINT QUOTE
Patient brought to ER by EMS from [facility] for pain to BASIM drain. Pt went to [place] on 10/4 s/p perf diverticulum, colostomy

## 2019-11-08 NOTE — ED PROVIDER NOTE - SKIN, MLM
Skin normal color for race, warm, dry and intact. No evidence of rash. Skin normal color for race, warm, dry and intact. No evidence of rash. + induration around gurdeep drain.

## 2019-11-08 NOTE — ED PROVIDER NOTE - CONSTITUTIONAL, MLM
normal... Well appearing, well nourished, awake, alert, oriented to person, place, time/situation and in no apparent distress. non toxic. vitals noted, slight tachycardia.

## 2019-11-08 NOTE — ED ADULT NURSE NOTE - OBJECTIVE STATEMENT
Patient present BIBA Patient present BIBA from facility c/o left sided abdominal s/p surgery in june with right sided colostomy and abdominal wound. Patient c/o pain to left side with tender 8/10 and sharp in sensation as per daughter at bedside she has had pain but becoming progressively worse so she brought her in. left sided drainage to [facility] WITH CLEAR DRAINAGE.

## 2019-11-08 NOTE — ED PROVIDER NOTE - OBJECTIVE STATEMENT
75 yo F with a PMHX of DM, asthma/ COPD, CHF, afib PPM, diverticulitis with perforated diverticulitis s/p L hemicolectomy and end colostomy w/ multiple washouts and abdominal wall closure here with LLQ pain around BASIM drain site, with no difference in drainage pain x 3 days, worse today. Denies f/ c/ nausea vomiting, decreased urinary/ bowel output, cp ,sob.    Of note pt is [employer].

## 2019-11-08 NOTE — ED PROVIDER NOTE - CLINICAL SUMMARY MEDICAL DECISION MAKING FREE TEXT BOX
75 yo F with a PMHX of DM, asthma/ COPD, CHF, afib PPM, diverticulitis with perforated diverticulitis s/p L hemicolectomy and end colostomy w/ multiple washouts and abdominal wall closure here with LLQ pain around BASIM drain site, with no difference in drainage pain x 3 days, worse today.  plan labs, cultures, IVF, rectal temp eval for fever/ sepsis.   Likely ct a/p eval for worsening infection.   surg cs

## 2019-11-09 ENCOUNTER — INPATIENT (INPATIENT)
Facility: HOSPITAL | Age: 74
LOS: 1 days | Discharge: ANOTHER HOSPITAL | End: 2019-11-11
Attending: HOSPITALIST | Admitting: HOSPITALIST
Payer: MEDICARE

## 2019-11-09 DIAGNOSIS — Z90.49 ACQUIRED ABSENCE OF OTHER SPECIFIED PARTS OF DIGESTIVE TRACT: Chronic | ICD-10-CM

## 2019-11-09 DIAGNOSIS — I50.9 HEART FAILURE, UNSPECIFIED: ICD-10-CM

## 2019-11-09 DIAGNOSIS — J45.909 UNSPECIFIED ASTHMA, UNCOMPLICATED: ICD-10-CM

## 2019-11-09 DIAGNOSIS — I48.92 UNSPECIFIED ATRIAL FLUTTER: ICD-10-CM

## 2019-11-09 DIAGNOSIS — Z02.9 ENCOUNTER FOR ADMINISTRATIVE EXAMINATIONS, UNSPECIFIED: ICD-10-CM

## 2019-11-09 DIAGNOSIS — L02.211 CUTANEOUS ABSCESS OF ABDOMINAL WALL: ICD-10-CM

## 2019-11-09 DIAGNOSIS — E11.9 TYPE 2 DIABETES MELLITUS WITHOUT COMPLICATIONS: ICD-10-CM

## 2019-11-09 LAB
APTT BLD: 33.7 SEC — SIGNIFICANT CHANGE UP (ref 27.5–36.3)
GLUCOSE BLDC GLUCOMTR-MCNC: 102 MG/DL — HIGH (ref 70–99)
GLUCOSE BLDC GLUCOMTR-MCNC: 108 MG/DL — HIGH (ref 70–99)
GLUCOSE BLDC GLUCOMTR-MCNC: 152 MG/DL — HIGH (ref 70–99)
GLUCOSE BLDC GLUCOMTR-MCNC: 98 MG/DL — SIGNIFICANT CHANGE UP (ref 70–99)
INR BLD: 1.3 — HIGH (ref 0.88–1.17)
PROTHROM AB SERPL-ACNC: 14.5 SEC — HIGH (ref 9.8–13.1)
SPECIMEN SOURCE: SIGNIFICANT CHANGE UP
SPECIMEN SOURCE: SIGNIFICANT CHANGE UP

## 2019-11-09 PROCEDURE — 99222 1ST HOSP IP/OBS MODERATE 55: CPT | Mod: GC

## 2019-11-09 PROCEDURE — 99223 1ST HOSP IP/OBS HIGH 75: CPT

## 2019-11-09 RX ORDER — MAGNESIUM OXIDE 400 MG ORAL TABLET 241.3 MG
400 TABLET ORAL
Refills: 0 | Status: DISCONTINUED | OUTPATIENT
Start: 2019-11-09 | End: 2019-11-09

## 2019-11-09 RX ORDER — DEXTROSE 50 % IN WATER 50 %
15 SYRINGE (ML) INTRAVENOUS ONCE
Refills: 0 | Status: DISCONTINUED | OUTPATIENT
Start: 2019-11-09 | End: 2019-11-11

## 2019-11-09 RX ORDER — DEXTROSE 50 % IN WATER 50 %
25 SYRINGE (ML) INTRAVENOUS ONCE
Refills: 0 | Status: DISCONTINUED | OUTPATIENT
Start: 2019-11-09 | End: 2019-11-11

## 2019-11-09 RX ORDER — INFLUENZA VIRUS VACCINE 15; 15; 15; 15 UG/.5ML; UG/.5ML; UG/.5ML; UG/.5ML
0.5 SUSPENSION INTRAMUSCULAR ONCE
Refills: 0 | Status: DISCONTINUED | OUTPATIENT
Start: 2019-11-09 | End: 2019-11-11

## 2019-11-09 RX ORDER — SPIRONOLACTONE 25 MG/1
25 TABLET, FILM COATED ORAL DAILY
Refills: 0 | Status: DISCONTINUED | OUTPATIENT
Start: 2019-11-09 | End: 2019-11-09

## 2019-11-09 RX ORDER — DEXTROSE 50 % IN WATER 50 %
12.5 SYRINGE (ML) INTRAVENOUS ONCE
Refills: 0 | Status: DISCONTINUED | OUTPATIENT
Start: 2019-11-09 | End: 2019-11-11

## 2019-11-09 RX ORDER — HEPARIN SODIUM 5000 [USP'U]/ML
2500 INJECTION INTRAVENOUS; SUBCUTANEOUS EVERY 6 HOURS
Refills: 0 | Status: DISCONTINUED | OUTPATIENT
Start: 2019-11-09 | End: 2019-11-11

## 2019-11-09 RX ORDER — ASCORBIC ACID 60 MG
500 TABLET,CHEWABLE ORAL DAILY
Refills: 0 | Status: DISCONTINUED | OUTPATIENT
Start: 2019-11-09 | End: 2019-11-11

## 2019-11-09 RX ORDER — GLUCAGON INJECTION, SOLUTION 0.5 MG/.1ML
1 INJECTION, SOLUTION SUBCUTANEOUS ONCE
Refills: 0 | Status: DISCONTINUED | OUTPATIENT
Start: 2019-11-09 | End: 2019-11-11

## 2019-11-09 RX ORDER — GUAIFENESIN/DEXTROMETHORPHAN 600MG-30MG
200 TABLET, EXTENDED RELEASE 12 HR ORAL EVERY 6 HOURS
Refills: 0 | Status: DISCONTINUED | OUTPATIENT
Start: 2019-11-09 | End: 2019-11-11

## 2019-11-09 RX ORDER — HEPARIN SODIUM 5000 [USP'U]/ML
INJECTION INTRAVENOUS; SUBCUTANEOUS
Qty: 25000 | Refills: 0 | Status: DISCONTINUED | OUTPATIENT
Start: 2019-11-09 | End: 2019-11-11

## 2019-11-09 RX ORDER — DARBEPOETIN ALFA IN POLYSORBAT 200MCG/0.4
150 PEN INJECTOR (ML) SUBCUTANEOUS
Refills: 0 | Status: DISCONTINUED | OUTPATIENT
Start: 2019-11-09 | End: 2019-11-10

## 2019-11-09 RX ORDER — ERTAPENEM SODIUM 1 G/1
1000 INJECTION, POWDER, LYOPHILIZED, FOR SOLUTION INTRAMUSCULAR; INTRAVENOUS EVERY 24 HOURS
Refills: 0 | Status: DISCONTINUED | OUTPATIENT
Start: 2019-11-09 | End: 2019-11-11

## 2019-11-09 RX ORDER — APIXABAN 2.5 MG/1
5 TABLET, FILM COATED ORAL EVERY 12 HOURS
Refills: 0 | Status: DISCONTINUED | OUTPATIENT
Start: 2019-11-09 | End: 2019-11-09

## 2019-11-09 RX ORDER — FUROSEMIDE 40 MG
40 TABLET ORAL DAILY
Refills: 0 | Status: DISCONTINUED | OUTPATIENT
Start: 2019-11-09 | End: 2019-11-11

## 2019-11-09 RX ORDER — MONTELUKAST 4 MG/1
10 TABLET, CHEWABLE ORAL DAILY
Refills: 0 | Status: DISCONTINUED | OUTPATIENT
Start: 2019-11-09 | End: 2019-11-11

## 2019-11-09 RX ORDER — HEPARIN SODIUM 5000 [USP'U]/ML
5500 INJECTION INTRAVENOUS; SUBCUTANEOUS EVERY 6 HOURS
Refills: 0 | Status: DISCONTINUED | OUTPATIENT
Start: 2019-11-09 | End: 2019-11-11

## 2019-11-09 RX ORDER — SPIRONOLACTONE 25 MG/1
25 TABLET, FILM COATED ORAL DAILY
Refills: 0 | Status: DISCONTINUED | OUTPATIENT
Start: 2019-11-09 | End: 2019-11-11

## 2019-11-09 RX ORDER — TIOTROPIUM BROMIDE 18 UG/1
1 CAPSULE ORAL; RESPIRATORY (INHALATION) DAILY
Refills: 0 | Status: DISCONTINUED | OUTPATIENT
Start: 2019-11-09 | End: 2019-11-11

## 2019-11-09 RX ORDER — ACETAMINOPHEN 500 MG
2 TABLET ORAL
Qty: 0 | Refills: 0 | DISCHARGE

## 2019-11-09 RX ORDER — FERROUS SULFATE 325(65) MG
325 TABLET ORAL
Refills: 0 | Status: DISCONTINUED | OUTPATIENT
Start: 2019-11-09 | End: 2019-11-11

## 2019-11-09 RX ORDER — ATORVASTATIN CALCIUM 80 MG/1
20 TABLET, FILM COATED ORAL AT BEDTIME
Refills: 0 | Status: DISCONTINUED | OUTPATIENT
Start: 2019-11-09 | End: 2019-11-11

## 2019-11-09 RX ORDER — SODIUM CHLORIDE 9 MG/ML
1000 INJECTION, SOLUTION INTRAVENOUS
Refills: 0 | Status: DISCONTINUED | OUTPATIENT
Start: 2019-11-09 | End: 2019-11-11

## 2019-11-09 RX ORDER — FUROSEMIDE 40 MG
40 TABLET ORAL DAILY
Refills: 0 | Status: DISCONTINUED | OUTPATIENT
Start: 2019-11-09 | End: 2019-11-09

## 2019-11-09 RX ORDER — CETIRIZINE HYDROCHLORIDE 10 MG/1
1 TABLET ORAL
Qty: 0 | Refills: 0 | DISCHARGE

## 2019-11-09 RX ORDER — MAGNESIUM OXIDE 400 MG ORAL TABLET 241.3 MG
400 TABLET ORAL
Refills: 0 | Status: DISCONTINUED | OUTPATIENT
Start: 2019-11-09 | End: 2019-11-11

## 2019-11-09 RX ORDER — HEPARIN SODIUM 5000 [USP'U]/ML
5500 INJECTION INTRAVENOUS; SUBCUTANEOUS ONCE
Refills: 0 | Status: COMPLETED | OUTPATIENT
Start: 2019-11-09 | End: 2019-11-09

## 2019-11-09 RX ORDER — LORATADINE 10 MG/1
10 TABLET ORAL DAILY
Refills: 0 | Status: DISCONTINUED | OUTPATIENT
Start: 2019-11-09 | End: 2019-11-11

## 2019-11-09 RX ORDER — FAMOTIDINE 10 MG/ML
20 INJECTION INTRAVENOUS DAILY
Refills: 0 | Status: DISCONTINUED | OUTPATIENT
Start: 2019-11-09 | End: 2019-11-11

## 2019-11-09 RX ORDER — FOLIC ACID 0.8 MG
1 TABLET ORAL DAILY
Refills: 0 | Status: DISCONTINUED | OUTPATIENT
Start: 2019-11-09 | End: 2019-11-11

## 2019-11-09 RX ADMIN — Medication 500 MILLIGRAM(S): at 14:16

## 2019-11-09 RX ADMIN — MAGNESIUM OXIDE 400 MG ORAL TABLET 400 MILLIGRAM(S): 241.3 TABLET ORAL at 14:16

## 2019-11-09 RX ADMIN — SPIRONOLACTONE 25 MILLIGRAM(S): 25 TABLET, FILM COATED ORAL at 18:29

## 2019-11-09 RX ADMIN — ERTAPENEM SODIUM 120 MILLIGRAM(S): 1 INJECTION, POWDER, LYOPHILIZED, FOR SOLUTION INTRAMUSCULAR; INTRAVENOUS at 14:18

## 2019-11-09 RX ADMIN — HEPARIN SODIUM 5500 UNIT(S): 5000 INJECTION INTRAVENOUS; SUBCUTANEOUS at 20:16

## 2019-11-09 RX ADMIN — ATORVASTATIN CALCIUM 20 MILLIGRAM(S): 80 TABLET, FILM COATED ORAL at 21:48

## 2019-11-09 RX ADMIN — Medication 650 MILLIGRAM(S): at 04:03

## 2019-11-09 RX ADMIN — HEPARIN SODIUM 1200 UNIT(S)/HR: 5000 INJECTION INTRAVENOUS; SUBCUTANEOUS at 20:17

## 2019-11-09 RX ADMIN — MONTELUKAST 10 MILLIGRAM(S): 4 TABLET, CHEWABLE ORAL at 14:17

## 2019-11-09 RX ADMIN — Medication 5 MILLIGRAM(S): at 15:37

## 2019-11-09 RX ADMIN — TIOTROPIUM BROMIDE 1 CAPSULE(S): 18 CAPSULE ORAL; RESPIRATORY (INHALATION) at 18:29

## 2019-11-09 RX ADMIN — LORATADINE 10 MILLIGRAM(S): 10 TABLET ORAL at 14:17

## 2019-11-09 RX ADMIN — Medication 325 MILLIGRAM(S): at 18:29

## 2019-11-09 RX ADMIN — FAMOTIDINE 20 MILLIGRAM(S): 10 INJECTION INTRAVENOUS at 14:17

## 2019-11-09 RX ADMIN — MAGNESIUM OXIDE 400 MG ORAL TABLET 400 MILLIGRAM(S): 241.3 TABLET ORAL at 18:29

## 2019-11-09 RX ADMIN — Medication 40 MILLIGRAM(S): at 14:25

## 2019-11-09 RX ADMIN — Medication 1 MILLIGRAM(S): at 14:17

## 2019-11-09 NOTE — CONSULT NOTE ADULT - SUBJECTIVE AND OBJECTIVE BOX
General Surgery Consult      Consulting surgical team: B-Team  Consulting attending: VINOD Scott      HPI:        PAST MEDICAL HISTORY:  Refusal of blood transfusions as patient is Sabianism  Patient is Sabianism  Vertigo  Atrial flutter  Diverticulitis  Cardiomyopathy  Kidney stone  COPD (chronic obstructive pulmonary disease)  Cardiac Pacemaker  HTN - Hypertension  Gout  Diabetes  Congestive Heart Failure  Asthma        PAST SURGICAL HISTORY:  Status post left hemicolectomy  S/P cholecystectomy  AICD (Automatic Cardioverter/Defibrillator) Present  S/P Cholecystectomy        MEDICATIONS:        ALLERGIES:  Coreg (Other)  digoxin (Other; Short breath (Mild to Mod))  IV Contrast (Unknown)  metoprolol (Other)  penicillins (Hives)  Solu-Medrol (Other (Mild to Mod))        VITALS & I/Os:  Vital Signs Last 24 Hrs  T(C): 36.1 (2019 00:35), Max: 37.3 (2019 21:00)  T(F): 97 (2019 00:35), Max: 99.1 (2019 21:00)  HR: 109 (2019 04:03) (40 - 109)  BP: 117/46 (2019 04:03) (102/49 - 117/46)  BP(mean): --  RR: 19 (2019 04:03) (16 - 19)  SpO2: 97% (2019 04:03) (95% - 100%)    I&O's Summary        PHYSICAL EXAM:  General: No acute distress  Respiratory: Nonlabored  Cardiovascular: normotensive, regular rate  Abdominal:   Extremities: Warm      LABS:                        8.3    11.69 )-----------( 284      ( 2019 19:30 )             25.7         137  |  100  |  20  ----------------------------<  125<H>  3.8   |  25  |  0.47<L>    Ca    8.4      2019 19:30    TPro  6.3  /  Alb  2.5<L>  /  TBili  < 0.2<L>  /  DBili  x   /  AST  15  /  ALT  7   /  AlkPhos  112      Lactate:  11-08 @ 19:30  1.7              Urinalysis Basic - ( 2019 21:02 )    Color: YELLOW / Appearance: Lt TURBID / S.023 / pH: 7.0  Gluc: NEGATIVE / Ketone: NEGATIVE  / Bili: NEGATIVE / Urobili: NORMAL   Blood: MODERATE / Protein: 20 / Nitrite: NEGATIVE   Leuk Esterase: LARGE / RBC: >50 / WBC >50   Sq Epi: MANY / Non Sq Epi: x / Bacteria: SMALL          IMAGING:  EXAM:  CT ABDOMEN AND PELVIS IC      PROCEDURE DATE:  2019     INTERPRETATION:  CLINICAL INFORMATION: Lower quadrant pain    COMPARISON: CT abdomen and pelvis 10/24/2019    PROCEDURE:   CT of the Abdomen and Pelvis was performed with intravenous contrast.   Intravenous contrast: 90 ml Omnipaque 350. 10 ml discarded.  Oral contrast: None.  Sagittal and coronal reformats were performed.    FINDINGS:    LOWER CHEST: Partially imaged AICD. Right basilar subsegmental   atelectasis.    LIVER: Within normal limits.  BILE DUCTS: Normal caliber.  GALLBLADDER: Cholecystectomy.  SPLEEN: Within normal limits.  PANCREAS: Within normal limits.  ADRENALS: Within normal limits.  KIDNEYS/URETERS: Nonobstructive left renal calculi measuring up to 0.6   cm. No hydronephrosis.    BLADDER: Within normal limits.  REPRODUCTIVE ORGANS: Calcified fibroids.    BOWEL: Delgado's pouch and right-sided colostomy. Normal appendix. No   bowel obstruction. Colonic diverticulosis. No bowel wall thickening or   acute inflammatory change.  PERITONEUM: Pigtail catheter in the left lower quadrant, with collapse   collection cavity. No free air or new abscess. No ascites.  VESSELS: Atherosclerotic changes.  RETROPERITONEUM/LYMPH NODES: No lymphadenopathy.    ABDOMINAL WALL: Large ventral hernia containing unobstructed bowel.   Persistent left ventral wall subcutaneous collection measuring 4.5 x 1.4   cm.  BONES: Degenerative changes.    IMPRESSION:     No evidence of acute diverticulitis, as clinically questioned.    Increased size left ventral wall subcutaneous collection now measuring   4.5 cm. Please correlate clinically for superimposed infection.          EXAM:  CT ABDOMEN AND PELVIS OC      PROCEDURE DATE:  Oct 24 2019     INTERPRETATION:  CLINICAL INFORMATION: Perforated diverticulitis.    COMPARISON: 2019.    PROCEDURE:   CT of the Abdomen and Pelvis was performed without intravenous contrast.   Intravenous contrast: None.  Oral contrast: None.  Sagittal and coronal reformats were performed.    FINDINGS:    LOWER CHEST: Cardiac AICD leads.    LIVER: Within normal limits.   BILE DUCTS: Normal caliber.  GALLBLADDER: Status post cholecystectomy.  SPLEEN: Within normal limits.   PANCREAS: Within normal limits.  ADRENALS: Within normal limits.   KIDNEYS/URETERS: Nonobstructing left renal calculi.     BLADDER: Within normal limits.   REPRODUCTIVE ORGANS: Fibroid uterus.    BOWEL: No bowel obstruction. Status post Delgado's pouch with a colostomy.  PERITONEUM: No ascites.  Pigtail catheter in the left abdomen. A 1.8 x   1.4 cm fluid collection previously 3.2 x 2.3 cm.  VESSELS:  Within normal limits.  RETROPERITONEUM/LYMPH NODES: No lymphadenopathy.     ABDOMINAL WALL: Large ventral hernia with protrusion of bowel. Abdominal   wall fluid has improved.  BONES: Within normal limits.     IMPRESSION: Small left abdominal fluid collection is mildly decreased in   size. General Surgery Consult    Consulting surgical team: B-Team  Consulting attending: VINOD Scott      HPI:    Patient is a 74 year old female with a PMH of ashma, COPD, CHF, afib, PPM and diverticulitis who is s/p perforated diverticulitis of the transverse colon 2019 which required an ex-lap for feculent peritonitis and an end transverse colostomy (Dr. Alvarado).  This hospital course resulted in an open abdomen that was eventually closed on 19 with strattice mesh.  This was further complicated by subsequent abdominal abscesses in the Left paracolic and subcutaneous area.  The drain in the subcutaneous abscess was subsequently removed with interval resolution of this abscess.  Patient is presenting from rehab ([facility]) with increased LLQ pain.  She denies having any chills or fevers.    PAST MEDICAL HISTORY:  Refusal of blood transfusions as patient is Mu-ism  Patient is Mu-ism  Vertigo  Atrial flutter  Diverticulitis  Cardiomyopathy  Kidney stone  COPD (chronic obstructive pulmonary disease)  Cardiac Pacemaker  HTN - Hypertension  Gout  Diabetes  Congestive Heart Failure  Asthma        PAST SURGICAL HISTORY:  Status post left hemicolectomy  S/P cholecystectomy  AICD (Automatic Cardioverter/Defibrillator) Present  S/P Cholecystectomy        MEDICATIONS:        ALLERGIES:  Coreg (Other)  digoxin (Other; Short breath (Mild to Mod))  IV Contrast (Unknown)  metoprolol (Other)  penicillins (Hives)  Solu-Medrol (Other (Mild to Mod))        VITALS & I/Os:  Vital Signs Last 24 Hrs  T(C): 36.1 (2019 00:35), Max: 37.3 (2019 21:00)  T(F): 97 (2019 00:35), Max: 99.1 (2019 21:00)  HR: 109 (2019 04:03) (40 - 109)  BP: 117/46 (2019 04:03) (102/49 - 117/46)  BP(mean): --  RR: 19 (2019 04:03) (16 - 19)  SpO2: 97% (2019 04:03) (95% - 100%)    I&O's Summary        PHYSICAL EXAM:  General: No acute distress.  Lying in bed comfortably.  Respiratory: Nonlabored, no accessory  muscle use.  Cardiovascular: slightly tachycardic in the low 100s.  Abdominal: Soft, mildly TTP in the LLQ just medial to drain - which contains serrous fluid..  Colostomy in RLQ functional with stool in bag.  There is a pouched fistula over the midline.  Extremities: Warm      LABS:                        8.3    11.69 )-----------( 284      ( 2019 19:30 )             25.7         137  |  100  |  20  ----------------------------<  125<H>  3.8   |  25  |  0.47<L>    Ca    8.4      2019 19:30    TPro  6.3  /  Alb  2.5<L>  /  TBili  < 0.2<L>  /  DBili  x   /  AST  15  /  ALT  7   /  AlkPhos  112      Lactate:  11-08 @ 19:30  1.7              Urinalysis Basic - ( 2019 21:02 )    Color: YELLOW / Appearance: Lt TURBID / S.023 / pH: 7.0  Gluc: NEGATIVE / Ketone: NEGATIVE  / Bili: NEGATIVE / Urobili: NORMAL   Blood: MODERATE / Protein: 20 / Nitrite: NEGATIVE   Leuk Esterase: LARGE / RBC: >50 / WBC >50   Sq Epi: MANY / Non Sq Epi: x / Bacteria: SMALL          IMAGING:  EXAM:  CT ABDOMEN AND PELVIS IC      PROCEDURE DATE:  2019     INTERPRETATION:  CLINICAL INFORMATION: Lower quadrant pain    COMPARISON: CT abdomen and pelvis 10/24/2019    PROCEDURE:   CT of the Abdomen and Pelvis was performed with intravenous contrast.   Intravenous contrast: 90 ml Omnipaque 350. 10 ml discarded.  Oral contrast: None.  Sagittal and coronal reformats were performed.    FINDINGS:    LOWER CHEST: Partially imaged AICD. Right basilar subsegmental   atelectasis.    LIVER: Within normal limits.  BILE DUCTS: Normal caliber.  GALLBLADDER: Cholecystectomy.  SPLEEN: Within normal limits.  PANCREAS: Within normal limits.  ADRENALS: Within normal limits.  KIDNEYS/URETERS: Nonobstructive left renal calculi measuring up to 0.6   cm. No hydronephrosis.    BLADDER: Within normal limits.  REPRODUCTIVE ORGANS: Calcified fibroids.    BOWEL: Delgado's pouch and right-sided colostomy. Normal appendix. No   bowel obstruction. Colonic diverticulosis. No bowel wall thickening or   acute inflammatory change.  PERITONEUM: Pigtail catheter in the left lower quadrant, with collapse   collection cavity. No free air or new abscess. No ascites.  VESSELS: Atherosclerotic changes.  RETROPERITONEUM/LYMPH NODES: No lymphadenopathy.    ABDOMINAL WALL: Large ventral hernia containing unobstructed bowel.   Persistent left ventral wall subcutaneous collection measuring 4.5 x 1.4   cm.  BONES: Degenerative changes.    IMPRESSION:     No evidence of acute diverticulitis, as clinically questioned.    Increased size left ventral wall subcutaneous collection now measuring   4.5 cm. Please correlate clinically for superimposed infection.          EXAM:  CT ABDOMEN AND PELVIS OC      PROCEDURE DATE:  Oct 24 2019     INTERPRETATION:  CLINICAL INFORMATION: Perforated diverticulitis.    COMPARISON: 2019.    PROCEDURE:   CT of the Abdomen and Pelvis was performed without intravenous contrast.   Intravenous contrast: None.  Oral contrast: None.  Sagittal and coronal reformats were performed.    FINDINGS:    LOWER CHEST: Cardiac AICD leads.    LIVER: Within normal limits.   BILE DUCTS: Normal caliber.  GALLBLADDER: Status post cholecystectomy.  SPLEEN: Within normal limits.   PANCREAS: Within normal limits.  ADRENALS: Within normal limits.   KIDNEYS/URETERS: Nonobstructing left renal calculi.     BLADDER: Within normal limits.   REPRODUCTIVE ORGANS: Fibroid uterus.    BOWEL: No bowel obstruction. Status post Delgado's pouch with a colostomy.  PERITONEUM: No ascites.  Pigtail catheter in the left abdomen. A 1.8 x   1.4 cm fluid collection previously 3.2 x 2.3 cm.  VESSELS:  Within normal limits.  RETROPERITONEUM/LYMPH NODES: No lymphadenopathy.     ABDOMINAL WALL: Large ventral hernia with protrusion of bowel. Abdominal   wall fluid has improved.  BONES: Within normal limits.     IMPRESSION: Small left abdominal fluid collection is mildly decreased in   size.

## 2019-11-09 NOTE — H&P ADULT - NSICDXPASTMEDICALHX_GEN_ALL_CORE_FT
PAST MEDICAL HISTORY:  Asthma     Atrial flutter     Cardiac Pacemaker     Cardiomyopathy     Congestive Heart Failure     Diabetes     Diverticulitis     Gout     HTN - Hypertension     Kidney stone     Patient is Hinduism     Refusal of blood transfusions as patient is Hinduism     Vertigo

## 2019-11-09 NOTE — H&P ADULT - GASTROINTESTINAL COMMENTS
tender at the BASIM drain site and LLQ tenderness, + colostomy with brown stool in the bag, + pouch tender at the BASIM drain site and LLQ tenderness, + colostomy with brown stool in the bag, + pouched fistula in the mid line tender at the BASIM drain site and LLQ tenderness, drain with serous drainage,+ colostomy with brown stool in the bag, + pouched fistula in the mid line

## 2019-11-09 NOTE — ED ADULT NURSE REASSESSMENT NOTE - NSIMPLEMENTINTERV_GEN_ALL_ED
Implemented All Fall Risk Interventions:  [place] to call system. Call bell, personal items and telephone within reach. Instruct patient to call for assistance. Room bathroom lighting operational. Non-slip footwear when patient is off stretcher. Physically safe environment: no spills, clutter or unnecessary equipment. Stretcher in lowest position, wheels locked, appropriate side rails in place. Provide visual cue, wrist band, yellow gown, etc. Monitor gait and stability. Monitor for mental status changes and reorient to person, place, and time. Review medications for side effects contributing to fall risk. Reinforce activity limits and safety measures with patient and family.

## 2019-11-09 NOTE — H&P ADULT - PROBLEM SELECTOR PLAN 1
ct with ct with no evidence of acute diverticulitis, Increased size left ventral wall subcutaneous collection now measuring 4.5 cm. Sx consulted, ID consulted, cont Ertapenem, will consult IR likely Monday for drainage.  F/u ID recs ct with no evidence of acute diverticulitis, Increased size left ventral wall subcutaneous collection now measuring 4.5 cm. Sx consulted, ID consulted, cont Ertapenem, will consult IR likely Monday for drainage. F/u cultures  F/u ID recs

## 2019-11-09 NOTE — CONSULT NOTE ADULT - ASSESSMENT
74F PMHx of CHF, asthma, HTN, and Afib, as well as extensive surgical history who was found to have an increase in her size of a LLQ collection, most likley abscess. This collection has grown despite being on ertapenem. She is currently afebrile with a leukocytosis of 11.     LLQ collection, likely abscess  - Continue with ertapenem 1g q 24 hours  - Follow up cultures  - Collection needs to be drained  - When the collection is drained, ensure cultures are sent   - Monitor for fevers  - Trend WBCs    Danita Saleem, PGY-4  Infectious Disease Fellow   Pager: [phone]  After 5pm/weekends: [phone]
Patient is a 74 year old female with a PMH of ashma, COPD, CHF, afib, PPM and diverticulitis who is s/p perforated diverticulitis of the transverse colon 7/2019 requiring an ex-lap for feculent peritonitis and an end transverse colostomy (Dr. Alvarado) as well as IR drain of subQ LLQ abscess that was removed with subsequent resolution but now recurrent LLQ subQ abscess.    -Recommend IR drain vs aspiration of subcutaneous abscess.  This abscess is most likely the cause of her LLQ pain.  -Antibiotics per primary team.  Would consider obtaining ID consults as they have seen patient in past and was sent home with PICC and IV abx.  -Discussed with Attending Dr. Scott.

## 2019-11-09 NOTE — H&P ADULT - HISTORY OF PRESENT ILLNESS
74 year old female with a PMH of asthma, COPD, CHF, afib, PPM, recent admission for perforated diverticulitis of the transverse colon 7/2019 which required an ex-lap for feculent peritonitis and an end transverse colostomy, hospital course complicated resulted in an open abdomen that was eventually closed on 7/11/19 with strattice mesh. Pt had further complication with abdominal abscesses discharged with BASIM drain to rehab now coming in with increased LLQ pain and pain at the BASIM drain site. Pt denies any nausea, vomiting, fever, chills, sob, chest pain, dysuria or any other complaints.  Pt is on Ertapenem at the rehab, PICC line on the Rt arm.    In the ED pt was given morphine for pain, 1 lit NS, evaluated by sx rec ID consult and IR drain, cultures sent and admitted to medicine for further management.  Vitals temp 97.9, hr 97, resp 18, bp 92/58 sat 99%

## 2019-11-09 NOTE — ED ADULT NURSE REASSESSMENT NOTE - NS ED NURSE REASSESS COMMENT FT1
Perineal care done during shift with three people assistance, colostomy emptied.  Incontinence associated dermatitis noted on sacrum, barrier ointment applied . Patient requested tylenol for pain. Will follow up.

## 2019-11-09 NOTE — H&P ADULT - ASSESSMENT
74 year old female with a PMH of asthma, COPD, CHF, afib, PPM, recent admission for perforated diverticulitis of the transverse colon 7/2019 which required an ex-lap for feculent peritonitis and an end transverse colostomy, hospital course complicated resulted in an open abdomen that was eventually closed on 7/11/19 with strattice mesh. Pt had further complication with abdominal abscesses discharged with BASIM drain to rehab now co 74 year old female with a PMH of asthma, COPD, CHF, afib, PPM, recent admission for perforated diverticulitis, s/p colostomy, hospital course complicated by abdominal abscesses discharged with BASIM drain to rehab now admitted with abdominal pain, ct of a/p showing enlarging fluid collection concerning for abscess.

## 2019-11-09 NOTE — CONSULT NOTE ADULT - ATTENDING COMMENTS
74 year old female with asthma, COPD, CHF, Afib, PPM, was recently admitted and followed by ID for perforated diverticulitis in 7/2019 requiring ex-lap for feculent peritonitis and with colostomy and ileal conduit, with BASIM drain now presenting with LLQ pain.    Has been on ertapenem at rehab, R arm picc line  Remains afebrile  Leukocytosis  Ct with Increased size left ventral wall subcutaneous collection now measuring   4.5 cm  LLQ abscess    Recommend:  -Continue ertapenem  -IR evaluation for drainage and send for culture  -F/U blood cultures

## 2019-11-09 NOTE — H&P ADULT - NSICDXPASTSURGICALHX_GEN_ALL_CORE_FT
PAST SURGICAL HISTORY:  AICD (Automatic Cardioverter/Defibrillator) Present inserted in Aug, 2008. Due for battery change in 1 month. ( [employer]) . Inserted by Dr Duffy    S/P cholecystectomy     Status post left hemicolectomy

## 2019-11-09 NOTE — CONSULT NOTE ADULT - SUBJECTIVE AND OBJECTIVE BOX
INFECTIOUS DISEASE SERVICE INITIAL CONSULTATION NOTE    HPI:  74 year old female with a PMH of asthma, COPD, CHF, afib, PPM, recent admission for perforated diverticulitis of the transverse colon 2019 which required an ex-lap for feculent peritonitis and an end transverse colostomy, hospital course complicated resulted in an open abdomen that was eventually closed on 19 with strattice mesh. Pt had further complication with abdominal abscesses discharged with BASIM drain to rehab now coming in with increased LLQ pain and pain at the BASIM drain site. Pt denies any nausea, vomiting, fever, chills, sob, chest pain, dysuria or any other complaints.  Pt is on Ertapenem at the rehab, PICC line on the Rt arm.    In the ED pt was given morphine for pain, 1 lit NS, evaluated by sx rec ID consult and IR drain, cultures sent and admitted to medicine for further management.  Vitals temp 97.9, hr 97, resp 18, bp 92/58 sat 99% (2019 11:50)      PAST MEDICAL & SURGICAL HISTORY:  Refusal of blood transfusions as patient is Gnosticist  Patient is Gnosticist  Vertigo  Atrial flutter  Diverticulitis  Cardiomyopathy  Kidney stone  Cardiac Pacemaker  HTN - Hypertension  Gout  Diabetes  Congestive Heart Failure  Asthma  Status post left hemicolectomy  S/P cholecystectomy  AICD (Automatic Cardioverter/Defibrillator) Present: inserted in Aug, 2008. Due for battery change in 1 month. ( [employer]) . Inserted by Dr Duffy      REVIEW OF SYSTEMS:  Constitutional: no weakness, no fevers, no chills  Dermatologic: no rash  Respiratory: no SOB, no cough  Cardiovascular: no chest pain, no palpitations  Gastrointestinal: no nausea, no vomiting, no diarrhea  Genitourinary: no dysuria, no urinary frequency, no hematuria, no urinary retention  Musculoskeletal:	no weakness, no joint swelling/pain  Neurological: no focal weakness or numbness  Endocrine: no polyuria, no polydipsia    ACTIVE ANTIMICROBIAL/ANTIBIOTIC MEDICATIONS:  ertapenem  IVPB 1000 milliGRAM(s) IV Intermittent every 24 hours      OTHER MEDICATIONS:  apixaban 5 milliGRAM(s) Oral every 12 hours  ascorbic acid 500 milliGRAM(s) Oral daily  atorvastatin 20 milliGRAM(s) Oral at bedtime  darbepoetin Injectable Syringe 150 MICROGram(s) SubCutaneous every week  dextrose 40% Gel 15 Gram(s) Oral once PRN  dextrose 5%. 1000 milliLiter(s) IV Continuous <Continuous>  dextrose 50% Injectable 12.5 Gram(s) IV Push once  dextrose 50% Injectable 25 Gram(s) IV Push once  dextrose 50% Injectable 25 Gram(s) IV Push once  enalapril 5 milliGRAM(s) Oral daily  famotidine    Tablet 20 milliGRAM(s) Oral daily  ferrous    sulfate 325 milliGRAM(s) Oral two times a day  folic acid 1 milliGRAM(s) Oral daily  furosemide    Tablet 40 milliGRAM(s) Oral daily  glucagon  Injectable 1 milliGRAM(s) IntraMuscular once PRN  influenza   Vaccine 0.5 milliLiter(s) IntraMuscular once  loratadine 10 milliGRAM(s) Oral daily  magnesium oxide 400 milliGRAM(s) Oral three times a day with meals  montelukast 10 milliGRAM(s) Oral daily  spironolactone 25 milliGRAM(s) Oral daily  tiotropium 18 MICROgram(s) Capsule 1 Capsule(s) Inhalation daily      ALLERGIES:  Allergies    Coreg (Other)  digoxin (Other; Short breath (Mild to Mod))  IV Contrast (Unknown)  penicillins (Hives)    Intolerances    metoprolol (Other)  Solu-Medrol (Other (Mild to Mod))      SOCIAL HISTORY:  lived with daughter(now at [place]), drinks occasional wine, denies any drug or tobacco us    FAMILY HISTORY:  Family history of brain tumor      VITAL SIGNS:  ICU Vital Signs Last 24 Hrs  T(C): 36.8 (2019 12:31), Max: 37.3 (2019 21:00)  T(F): 98.2 (2019 12:31), Max: 99.1 (2019 21:00)  HR: 87 (2019 14:18) (40 - 109)  BP: 103/61 (2019 14:18) (92/58 - 117/46)  BP(mean): --  ABP: --  ABP(mean): --  RR: 18 (2019 12:31) (16 - 19)  SpO2: 94% (2019 12:31) (94% - 100%)      PHYSICAL EXAM:  Constitutional: Elderly woman sleeping comfortably   Head: NC/AT  Eyes: anicteric sclera  ENMT: no rhinorrhea; no sinus tenderness on palpation; no oropharyngeal lesions, erythema, or exudates	  Neck: supple; no JVD or LAD  Respiratory: CTA B/L  Cardiovascular: +S1/S2, RRR; no appreciable murmurs  Gastrointestinal: +ostomy on the right side with brown stool, ileal conduit in suprapubic region with yellow urine, fullness in LLQ wither exquisite tenderness  Extremities: WWP; no clubbing, cyanosis, or edema  Dermatologic: skin warm and dry; no visible rashes or lesions  Neurologic: not cooperative with neuro exam	    LABS:                        8.3    11.69 )-----------( 284      ( 2019 19:30 )             25.7         137  |  100  |  20  ----------------------------<  125<H>  3.8   |  25  |  0.47<L>    Ca    8.4      2019 19:30    TPro  6.3  /  Alb  2.5<L>  /  TBili  < 0.2<L>  /  DBili  x   /  AST  15  /  ALT  7   /  AlkPhos  112        Urinalysis Basic - ( 2019 21:02 )    Color: YELLOW / Appearance: Lt TURBID / S.023 / pH: 7.0  Gluc: NEGATIVE / Ketone: NEGATIVE  / Bili: NEGATIVE / Urobili: NORMAL   Blood: MODERATE / Protein: 20 / Nitrite: NEGATIVE   Leuk Esterase: LARGE / RBC: >50 / WBC >50   Sq Epi: MANY / Non Sq Epi: x / Bacteria: SMALL        MICROBIOLOGY:  Blood and urine cultures pending     RADIOLOGY & ADDITIONAL STUDIES:    < from: CT Abdomen and Pelvis w/ IV Cont (19 @ 23:52) >  EXAM:  CT ABDOMEN AND PELVIS IC        PROCEDURE DATE:  2019         INTERPRETATION:  CLINICAL INFORMATION: Lower quadrant pain    COMPARISON: CT abdomen and pelvis 10/24/2019    PROCEDURE:   CT of the Abdomen and Pelvis was performed with intravenous contrast.   Intravenous contrast: 90 ml Omnipaque 350. 10 ml discarded.  Oral contrast: None.  Sagittal and coronal reformats were performed.    FINDINGS:    LOWER CHEST: Partially imaged AICD. Right basilar subsegmental   atelectasis.    LIVER: Within normal limits.  BILE DUCTS: Normal caliber.  GALLBLADDER: Cholecystectomy.  SPLEEN: Within normal limits.  PANCREAS: Within normal limits.  ADRENALS: Within normal limits.  KIDNEYS/URETERS: Nonobstructive left renal calculi measuring up to 0.6   cm. No hydronephrosis.    BLADDER: Within normal limits.  REPRODUCTIVE ORGANS: Calcified fibroids.    BOWEL: Delgado's pouch and right-sided colostomy. Normal appendix. No   bowel obstruction. Colonic diverticulosis. No bowel wall thickening or   acute inflammatory change.  PERITONEUM: Pigtail catheter in the left lower quadrant, with collapse   collection cavity. No free air or new abscess. No ascites.  VESSELS: Atherosclerotic changes.  RETROPERITONEUM/LYMPH NODES: No lymphadenopathy.    ABDOMINAL WALL: Large ventral hernia containing unobstructed bowel.   Persistent left ventral wall subcutaneous collection measuring 4.5 x 1.4   cm.  BONES: Degenerative changes.    IMPRESSION:     No evidence of acute diverticulitis, as clinically questioned.    Increased size left ventral wall subcutaneous collection now measuring   4.5 cm. Please correlate clinically for superimposed infection.              HERSON AVILES M.D., RADIOLOGY RESIDENT  This document has been electronically signed.  MIKALA MAC M.D., RADIOLOGIST  This document has been electronically signed. 2019  1:18AM        < end of copied text >

## 2019-11-09 NOTE — H&P ADULT - PROBLEM SELECTOR PLAN 6
1.  Name of PCP:  2.  PCP Contacted on Admission: [ ] Y    [x ] N  sat adm from [place] to call  on monday  3.  PCP contacted at Discharge: [ ] Y    [ ] N    [ ] N/A  4.  Post-Discharge Appointment Date and Location:  5.  Summary of Handoff given to PCP:

## 2019-11-10 DIAGNOSIS — E11.9 TYPE 2 DIABETES MELLITUS WITHOUT COMPLICATIONS: ICD-10-CM

## 2019-11-10 DIAGNOSIS — I50.22 CHRONIC SYSTOLIC (CONGESTIVE) HEART FAILURE: ICD-10-CM

## 2019-11-10 DIAGNOSIS — E87.6 HYPOKALEMIA: ICD-10-CM

## 2019-11-10 LAB
ALBUMIN SERPL ELPH-MCNC: 2.2 G/DL — LOW (ref 3.3–5)
ALP SERPL-CCNC: 89 U/L — SIGNIFICANT CHANGE UP (ref 40–120)
ALT FLD-CCNC: 7 U/L — SIGNIFICANT CHANGE UP (ref 4–33)
ANION GAP SERPL CALC-SCNC: 12 MMO/L — SIGNIFICANT CHANGE UP (ref 7–14)
APTT BLD: 109.6 SEC — HIGH (ref 27.5–36.3)
APTT BLD: 48.7 SEC — HIGH (ref 27.5–36.3)
APTT BLD: > 200 SEC — CRITICAL HIGH (ref 27.5–36.3)
AST SERPL-CCNC: 13 U/L — SIGNIFICANT CHANGE UP (ref 4–32)
BILIRUB SERPL-MCNC: < 0.2 MG/DL — LOW (ref 0.2–1.2)
BUN SERPL-MCNC: 14 MG/DL — SIGNIFICANT CHANGE UP (ref 7–23)
CALCIUM SERPL-MCNC: 8.2 MG/DL — LOW (ref 8.4–10.5)
CHLORIDE SERPL-SCNC: 102 MMOL/L — SIGNIFICANT CHANGE UP (ref 98–107)
CO2 SERPL-SCNC: 24 MMOL/L — SIGNIFICANT CHANGE UP (ref 22–31)
CREAT SERPL-MCNC: 0.59 MG/DL — SIGNIFICANT CHANGE UP (ref 0.5–1.3)
GLUCOSE BLDC GLUCOMTR-MCNC: 101 MG/DL — HIGH (ref 70–99)
GLUCOSE BLDC GLUCOMTR-MCNC: 124 MG/DL — HIGH (ref 70–99)
GLUCOSE BLDC GLUCOMTR-MCNC: 75 MG/DL — SIGNIFICANT CHANGE UP (ref 70–99)
GLUCOSE BLDC GLUCOMTR-MCNC: 81 MG/DL — SIGNIFICANT CHANGE UP (ref 70–99)
GLUCOSE BLDC GLUCOMTR-MCNC: 94 MG/DL — SIGNIFICANT CHANGE UP (ref 70–99)
GLUCOSE SERPL-MCNC: 81 MG/DL — SIGNIFICANT CHANGE UP (ref 70–99)
HBA1C BLD-MCNC: 5.1 % — SIGNIFICANT CHANGE UP (ref 4–5.6)
HCT VFR BLD CALC: 23.4 % — LOW (ref 34.5–45)
HCT VFR BLD CALC: 26.1 % — LOW (ref 34.5–45)
HGB BLD-MCNC: 7.5 G/DL — LOW (ref 11.5–15.5)
HGB BLD-MCNC: 8.5 G/DL — LOW (ref 11.5–15.5)
MCHC RBC-ENTMCNC: 32.1 % — SIGNIFICANT CHANGE UP (ref 32–36)
MCHC RBC-ENTMCNC: 32.6 % — SIGNIFICANT CHANGE UP (ref 32–36)
MCHC RBC-ENTMCNC: 33 PG — SIGNIFICANT CHANGE UP (ref 27–34)
MCHC RBC-ENTMCNC: 33.5 PG — SIGNIFICANT CHANGE UP (ref 27–34)
MCV RBC AUTO: 102.8 FL — HIGH (ref 80–100)
MCV RBC AUTO: 103.1 FL — HIGH (ref 80–100)
NRBC # FLD: 0 K/UL — SIGNIFICANT CHANGE UP (ref 0–0)
NRBC # FLD: 0 K/UL — SIGNIFICANT CHANGE UP (ref 0–0)
PLATELET # BLD AUTO: 260 K/UL — SIGNIFICANT CHANGE UP (ref 150–400)
PLATELET # BLD AUTO: 282 K/UL — SIGNIFICANT CHANGE UP (ref 150–400)
PMV BLD: 9.6 FL — SIGNIFICANT CHANGE UP (ref 7–13)
PMV BLD: 9.8 FL — SIGNIFICANT CHANGE UP (ref 7–13)
POTASSIUM SERPL-MCNC: 3.1 MMOL/L — LOW (ref 3.5–5.3)
POTASSIUM SERPL-SCNC: 3.1 MMOL/L — LOW (ref 3.5–5.3)
PROT SERPL-MCNC: 5.7 G/DL — LOW (ref 6–8.3)
RBC # BLD: 2.27 M/UL — LOW (ref 3.8–5.2)
RBC # BLD: 2.54 M/UL — LOW (ref 3.8–5.2)
RBC # FLD: 17.8 % — HIGH (ref 10.3–14.5)
RBC # FLD: 17.8 % — HIGH (ref 10.3–14.5)
SODIUM SERPL-SCNC: 138 MMOL/L — SIGNIFICANT CHANGE UP (ref 135–145)
WBC # BLD: 11.39 K/UL — HIGH (ref 3.8–10.5)
WBC # BLD: 12.22 K/UL — HIGH (ref 3.8–10.5)
WBC # FLD AUTO: 11.39 K/UL — HIGH (ref 3.8–10.5)
WBC # FLD AUTO: 12.22 K/UL — HIGH (ref 3.8–10.5)

## 2019-11-10 PROCEDURE — 99233 SBSQ HOSP IP/OBS HIGH 50: CPT

## 2019-11-10 PROCEDURE — 99232 SBSQ HOSP IP/OBS MODERATE 35: CPT

## 2019-11-10 RX ORDER — DARBEPOETIN ALFA IN POLYSORBAT 200MCG/0.4
150 PEN INJECTOR (ML) SUBCUTANEOUS
Refills: 0 | Status: DISCONTINUED | OUTPATIENT
Start: 2019-11-10 | End: 2019-11-11

## 2019-11-10 RX ORDER — POTASSIUM CHLORIDE 20 MEQ
40 PACKET (EA) ORAL EVERY 4 HOURS
Refills: 0 | Status: COMPLETED | OUTPATIENT
Start: 2019-11-10 | End: 2019-11-10

## 2019-11-10 RX ORDER — ACETAMINOPHEN 500 MG
650 TABLET ORAL EVERY 6 HOURS
Refills: 0 | Status: DISCONTINUED | OUTPATIENT
Start: 2019-11-10 | End: 2019-11-11

## 2019-11-10 RX ORDER — CHLORHEXIDINE GLUCONATE 213 G/1000ML
1 SOLUTION TOPICAL DAILY
Refills: 0 | Status: DISCONTINUED | OUTPATIENT
Start: 2019-11-10 | End: 2019-11-11

## 2019-11-10 RX ADMIN — HEPARIN SODIUM 2500 UNIT(S): 5000 INJECTION INTRAVENOUS; SUBCUTANEOUS at 18:56

## 2019-11-10 RX ADMIN — FAMOTIDINE 20 MILLIGRAM(S): 10 INJECTION INTRAVENOUS at 11:31

## 2019-11-10 RX ADMIN — ERTAPENEM SODIUM 120 MILLIGRAM(S): 1 INJECTION, POWDER, LYOPHILIZED, FOR SOLUTION INTRAMUSCULAR; INTRAVENOUS at 13:56

## 2019-11-10 RX ADMIN — MAGNESIUM OXIDE 400 MG ORAL TABLET 400 MILLIGRAM(S): 241.3 TABLET ORAL at 09:57

## 2019-11-10 RX ADMIN — Medication 500 MILLIGRAM(S): at 11:31

## 2019-11-10 RX ADMIN — Medication 325 MILLIGRAM(S): at 06:10

## 2019-11-10 RX ADMIN — MAGNESIUM OXIDE 400 MG ORAL TABLET 400 MILLIGRAM(S): 241.3 TABLET ORAL at 17:03

## 2019-11-10 RX ADMIN — ATORVASTATIN CALCIUM 20 MILLIGRAM(S): 80 TABLET, FILM COATED ORAL at 21:39

## 2019-11-10 RX ADMIN — Medication 325 MILLIGRAM(S): at 17:03

## 2019-11-10 RX ADMIN — Medication 650 MILLIGRAM(S): at 11:29

## 2019-11-10 RX ADMIN — CHLORHEXIDINE GLUCONATE 1 APPLICATION(S): 213 SOLUTION TOPICAL at 11:29

## 2019-11-10 RX ADMIN — HEPARIN SODIUM 0 UNIT(S)/HR: 5000 INJECTION INTRAVENOUS; SUBCUTANEOUS at 03:19

## 2019-11-10 RX ADMIN — Medication 650 MILLIGRAM(S): at 12:00

## 2019-11-10 RX ADMIN — Medication 1 MILLIGRAM(S): at 09:57

## 2019-11-10 RX ADMIN — Medication 40 MILLIEQUIVALENT(S): at 09:57

## 2019-11-10 RX ADMIN — MAGNESIUM OXIDE 400 MG ORAL TABLET 400 MILLIGRAM(S): 241.3 TABLET ORAL at 13:56

## 2019-11-10 RX ADMIN — TIOTROPIUM BROMIDE 1 CAPSULE(S): 18 CAPSULE ORAL; RESPIRATORY (INHALATION) at 09:57

## 2019-11-10 RX ADMIN — LORATADINE 10 MILLIGRAM(S): 10 TABLET ORAL at 09:57

## 2019-11-10 RX ADMIN — MONTELUKAST 10 MILLIGRAM(S): 4 TABLET, CHEWABLE ORAL at 09:57

## 2019-11-10 RX ADMIN — HEPARIN SODIUM 1000 UNIT(S)/HR: 5000 INJECTION INTRAVENOUS; SUBCUTANEOUS at 18:55

## 2019-11-10 RX ADMIN — HEPARIN SODIUM 1000 UNIT(S)/HR: 5000 INJECTION INTRAVENOUS; SUBCUTANEOUS at 04:25

## 2019-11-10 RX ADMIN — Medication 40 MILLIEQUIVALENT(S): at 13:56

## 2019-11-10 RX ADMIN — HEPARIN SODIUM 900 UNIT(S)/HR: 5000 INJECTION INTRAVENOUS; SUBCUTANEOUS at 11:29

## 2019-11-10 NOTE — PROGRESS NOTE ADULT - PROBLEM SELECTOR PROBLEM 4
Asthma Type 2 diabetes mellitus without complication, without long-term current use of insulin Chronic systolic heart failure

## 2019-11-10 NOTE — PROGRESS NOTE ADULT - PROBLEM SELECTOR PLAN 4
cont home meds Controlled with diet, no medications; Hgb A1c = 5.1%  -can likely d/c FS/ISS when patient is no longer NPO after procedure to monitor for hypoglycemia No acute exacerbation, AICD in place  -however most recent TTE w/ normal LV function (9/2019)  -c/w home Lasix 40mg PO daily, Spironolactone 25mg PO daily, Enalapril 5mg PO daily  -monitor volume status

## 2019-11-10 NOTE — PROGRESS NOTE ADULT - PROBLEM SELECTOR PLAN 6
1.  Name of PCP:  2.  PCP Contacted on Admission: [ ] Y    [x ] N  sat adm from [place] to call  on monday  3.  PCP contacted at Discharge: [ ] Y    [ ] N    [ ] N/A  4.  Post-Discharge Appointment Date and Location:  5.  Summary of Handoff given to PCP: -c/w Spiriva and Singulair

## 2019-11-10 NOTE — PROGRESS NOTE ADULT - SUBJECTIVE AND OBJECTIVE BOX
CC: Patient is a 74y old  Female who presents with a chief complaint of abdominal pain (2019 14:59)    ID following for LLQ abscess    Interval History/ROS: Patient remains with drain, minimal output, remains with LLQ pain. No fevers, no chills.    Rest of ROS negative.    Allergies  Coreg (Other)  digoxin (Other; Short breath (Mild to Mod))  IV Contrast (Unknown)  penicillins (Hives)    ANTIMICROBIALS:  ertapenem  IVPB 1000 every 24 hours    OTHER MEDS:  ascorbic acid 500 milliGRAM(s) Oral daily  atorvastatin 20 milliGRAM(s) Oral at bedtime  chlorhexidine 4% Liquid 1 Application(s) Topical daily  darbepoetin Injectable Syringe 150 MICROGram(s) SubCutaneous every week  dextrose 40% Gel 15 Gram(s) Oral once PRN  dextrose 5%. 1000 milliLiter(s) IV Continuous <Continuous>  dextrose 50% Injectable 12.5 Gram(s) IV Push once  dextrose 50% Injectable 25 Gram(s) IV Push once  dextrose 50% Injectable 25 Gram(s) IV Push once  enalapril 5 milliGRAM(s) Oral daily  famotidine    Tablet 20 milliGRAM(s) Oral daily  ferrous    sulfate 325 milliGRAM(s) Oral two times a day  folic acid 1 milliGRAM(s) Oral daily  furosemide    Tablet 40 milliGRAM(s) Oral daily  glucagon  Injectable 1 milliGRAM(s) IntraMuscular once PRN  guaifenesin/dextromethorphan  Syrup 200 milliLiter(s) Oral every 6 hours PRN  heparin  Infusion.  Unit(s)/Hr IV Continuous <Continuous>  heparin  Injectable 5500 Unit(s) IV Push every 6 hours PRN  heparin  Injectable 2500 Unit(s) IV Push every 6 hours PRN  influenza   Vaccine 0.5 milliLiter(s) IntraMuscular once  loratadine 10 milliGRAM(s) Oral daily  magnesium oxide 400 milliGRAM(s) Oral three times a day with meals  montelukast 10 milliGRAM(s) Oral daily  potassium chloride    Tablet ER 40 milliEquivalent(s) Oral every 4 hours  spironolactone 25 milliGRAM(s) Oral daily  tiotropium 18 MICROgram(s) Capsule 1 Capsule(s) Inhalation daily    PE:    Vital Signs Last 24 Hrs  T(C): 36.2 (10 Nov 2019 09:48), Max: 37 (2019 21:36)  T(F): 97.2 (10 Nov 2019 09:48), Max: 98.6 (2019 21:36)  HR: 93 (10 Nov 2019 09:48) (84 - 100)  BP: 94/51 (10 Nov 2019 09:48) (94/51 - 110/49)  BP(mean): --  RR: 17 (10 Nov 2019 09:48) (17 - 18)  SpO2: 98% (10 Nov 2019 09:48) (94% - 99%)    Gen: AOx3, NAD, non-toxic  CV: S1+S2 normal, no murmurs  Resp: Clear bilat, no resp distress  Abd: left sided drain in place, minimal output, LLQ tenderness  Ext: No LE edema, no wounds  : No Ching  IV/Skin: No thrombophlebitis  Neuro: no focal deficits    LABS:                          8.5    11.39 )-----------( 282      ( 10 Nov 2019 06:40 )             26.1       11-10    138  |  102  |  14  ----------------------------<  81  3.1<L>   |  24  |  0.59    Ca    8.2<L>      10 Nov 2019 06:49    TPro  5.7<L>  /  Alb  2.2<L>  /  TBili  < 0.2<L>  /  DBili  x   /  AST  13  /  ALT  7   /  AlkPhos  89  11-10      Urinalysis Basic - ( 2019 21:02 )    Color: YELLOW / Appearance: Lt TURBID / S.023 / pH: 7.0  Gluc: NEGATIVE / Ketone: NEGATIVE  / Bili: NEGATIVE / Urobili: NORMAL   Blood: MODERATE / Protein: 20 / Nitrite: NEGATIVE   Leuk Esterase: LARGE / RBC: >50 / WBC >50   Sq Epi: MANY / Non Sq Epi: x / Bacteria: SMALL    MICROBIOLOGY:  v  BLOOD PERIPHERAL  19 --  --  --    RADIOLOGY:    < from: CT Abdomen and Pelvis w/ IV Cont (19 @ 23:52) >  IMPRESSION:     No evidence of acute diverticulitis, as clinically questioned.    Increased size left ventral wall subcutaneous collection now measuring   4.5 cm. Please correlate clinically for superimposed infection.    < end of copied text >

## 2019-11-10 NOTE — PROGRESS NOTE ADULT - SUBJECTIVE AND OBJECTIVE BOX
INCOMPLETE Lloyd Norris M.D  Internal Medicine Hospitalist  Pager: [phone]    Patient is a 74y old  Female who presents with a chief complaint of abdominal pain (10 Nov 2019 14:07)    SUBJECTIVE / OVERNIGHT EVENTS:    REVIEW OF SYSTEMS:  CONSTITUTIONAL: No weakness, fevers or chills  EYES/ENT: No visual changes;  No vertigo or throat pain   NECK: No pain or stiffness  RESPIRATORY: No cough, wheezing, hemoptysis; No shortness of breath  CARDIOVASCULAR: No chest pain or palpitations  GASTROINTESTINAL: No abdominal pain, nausea, vomiting, or diarrhea. No melena or hematochezia.  GENITOURINARY: No dysuria, frequency or hematuria  NEUROLOGICAL: No numbness or weakness  SKIN: No itching, burning, rashes, or lesions   All other review of systems is negative unless indicated above.    MEDICATIONS  (STANDING):  ascorbic acid 500 milliGRAM(s) Oral daily  atorvastatin 20 milliGRAM(s) Oral at bedtime  chlorhexidine 4% Liquid 1 Application(s) Topical daily  darbepoetin Injectable Syringe 150 MICROGram(s) SubCutaneous <User Schedule>  dextrose 5%. 1000 milliLiter(s) (50 mL/Hr) IV Continuous <Continuous>  dextrose 50% Injectable 12.5 Gram(s) IV Push once  dextrose 50% Injectable 25 Gram(s) IV Push once  dextrose 50% Injectable 25 Gram(s) IV Push once  enalapril 5 milliGRAM(s) Oral daily  ertapenem  IVPB 1000 milliGRAM(s) IV Intermittent every 24 hours  famotidine    Tablet 20 milliGRAM(s) Oral daily  ferrous    sulfate 325 milliGRAM(s) Oral two times a day  folic acid 1 milliGRAM(s) Oral daily  furosemide    Tablet 40 milliGRAM(s) Oral daily  heparin  Infusion.  Unit(s)/Hr (12 mL/Hr) IV Continuous <Continuous>  influenza   Vaccine 0.5 milliLiter(s) IntraMuscular once  loratadine 10 milliGRAM(s) Oral daily  magnesium oxide 400 milliGRAM(s) Oral three times a day with meals  montelukast 10 milliGRAM(s) Oral daily  spironolactone 25 milliGRAM(s) Oral daily  tiotropium 18 MICROgram(s) Capsule 1 Capsule(s) Inhalation daily    MEDICATIONS  (PRN):  acetaminophen   Tablet .. 650 milliGRAM(s) Oral every 6 hours PRN Mild Pain (1 - 3), Moderate Pain (4 - 6)  dextrose 40% Gel 15 Gram(s) Oral once PRN Blood Glucose LESS THAN 70 milliGRAM(s)/deciliter  glucagon  Injectable 1 milliGRAM(s) IntraMuscular once PRN Glucose LESS THAN 70 milligrams/deciliter  guaifenesin/dextromethorphan  Syrup 200 milliLiter(s) Oral every 6 hours PRN couhgh  heparin  Injectable 5500 Unit(s) IV Push every 6 hours PRN For aPTT less than 40  heparin  Injectable 2500 Unit(s) IV Push every 6 hours PRN For aPTT between 40 - 57      T(C): 37 (11-10-19 @ 16:56), Max: 37 (19 @ 21:36)  HR: 88 (11-10-19 @ 16:56) (88 - 100)  BP: 122/58 (11-10-19 @ 16:56) (94/51 - 122/58)  RR: 17 (11-10-19 @ 16:56) (17 - 18)  SpO2: 98% (11-10-19 @ 16:56) (98% - 99%)    CAPILLARY BLOOD GLUCOSE      POCT Blood Glucose.: 81 mg/dL (10 Nov 2019 16:47)  POCT Blood Glucose.: 94 mg/dL (10 Nov 2019 12:17)  POCT Blood Glucose.: 101 mg/dL (10 Nov 2019 07:39)  POCT Blood Glucose.: 102 mg/dL (2019 21:51)  POCT Blood Glucose.: 98 mg/dL (2019 17:09)    I&O's Summary    2019 07:01  -  10 Nov 2019 07:00  --------------------------------------------------------  IN: 480 mL / OUT: 305 mL / NET: 175 mL        GENERAL: No acute distress, well-developed  HEAD:  Atraumatic, Normocephalic  ENT: EOMI, PERRLA, No JVD, moist mucosa  CHEST/LUNG: Clear to auscultation bilaterally  BACK: No spinal tenderness  HEART: Regular rate and rhythm; No murmurs, rubs, or gallops  ABDOMEN: Soft, Nontender, Nondistended; Bowel sounds present  EXTREMITIES:  No clubbing, cyanosis, or edema  PSYCH: Nl behavior, nl affect  NEUROLOGY: AAOx3, non-focal, cranial nerves intact  SKIN: Normal color, No rashes or lesions    LABS:                        8.5    11.39 )-----------( 282      ( 10 Nov 2019 06:40 )             26.1     11-10    138  |  102  |  14  ----------------------------<  81  3.1<L>   |  24  |  0.59    Ca    8.2<L>      10 Nov 2019 06:49    TPro  5.7<L>  /  Alb  2.2<L>  /  TBili  < 0.2<L>  /  DBili  x   /  AST  13  /  ALT  7   /  AlkPhos  89  11-10    PT/INR - ( 2019 18:54 )   PT: 14.5 SEC;   INR: 1.30          PTT - ( 10 Nov 2019 10:35 )  PTT:109.6 SEC      Urinalysis Basic - ( 2019 21:02 )    Color: YELLOW / Appearance: Lt TURBID / S.023 / pH: 7.0  Gluc: NEGATIVE / Ketone: NEGATIVE  / Bili: NEGATIVE / Urobili: NORMAL   Blood: MODERATE / Protein: 20 / Nitrite: NEGATIVE   Leuk Esterase: LARGE / RBC: >50 / WBC >50   Sq Epi: MANY / Non Sq Epi: x / Bacteria: SMALL      RADIOLOGY & ADDITIONAL TESTS:  Imaging Personally Reviewed.  Consultant(s) Notes Reviewed:  [x ] YES  [ ] NO  Care Discussed with Consultants/Other Providers [ x] YES  [ ] NO Lloyd Norris M.D  Internal Medicine Hospitalist  Pager: [phone]    Patient is a 74y old  Female who presents with a chief complaint of abdominal pain (10 Nov 2019 14:07)    SUBJECTIVE / OVERNIGHT EVENTS: Patient without specific complaint, seen and examined while eating lunch. Tolerating PO without issue, denies abdominal pain, N/V/D. Patient asking what the plan will be for this hospitalization. She denies any fevers, chills.    REVIEW OF SYSTEMS:  CONSTITUTIONAL: No weakness, fevers or chills  EYES/ENT: No visual changes;  No vertigo or throat pain   NECK: No pain or stiffness  RESPIRATORY: +occasional cough. No wheezing, hemoptysis; No shortness of breath  CARDIOVASCULAR: No chest pain or palpitations  GASTROINTESTINAL: No abdominal pain, nausea, vomiting, or diarrhea. No melena.  GENITOURINARY: No dysuria, frequency or hematuria  NEUROLOGICAL: No numbness or weakness  SKIN: No itching, burning, rashes, or lesions   All other review of systems is negative unless indicated above.    MEDICATIONS  (STANDING):  ascorbic acid 500 milliGRAM(s) Oral daily  atorvastatin 20 milliGRAM(s) Oral at bedtime  chlorhexidine 4% Liquid 1 Application(s) Topical daily  darbepoetin Injectable Syringe 150 MICROGram(s) SubCutaneous <User Schedule>  dextrose 5%. 1000 milliLiter(s) (50 mL/Hr) IV Continuous <Continuous>  dextrose 50% Injectable 12.5 Gram(s) IV Push once  dextrose 50% Injectable 25 Gram(s) IV Push once  dextrose 50% Injectable 25 Gram(s) IV Push once  enalapril 5 milliGRAM(s) Oral daily  ertapenem  IVPB 1000 milliGRAM(s) IV Intermittent every 24 hours  famotidine    Tablet 20 milliGRAM(s) Oral daily  ferrous    sulfate 325 milliGRAM(s) Oral two times a day  folic acid 1 milliGRAM(s) Oral daily  furosemide    Tablet 40 milliGRAM(s) Oral daily  heparin  Infusion.  Unit(s)/Hr (12 mL/Hr) IV Continuous <Continuous>  influenza   Vaccine 0.5 milliLiter(s) IntraMuscular once  loratadine 10 milliGRAM(s) Oral daily  magnesium oxide 400 milliGRAM(s) Oral three times a day with meals  montelukast 10 milliGRAM(s) Oral daily  spironolactone 25 milliGRAM(s) Oral daily  tiotropium 18 MICROgram(s) Capsule 1 Capsule(s) Inhalation daily    MEDICATIONS  (PRN):  acetaminophen   Tablet .. 650 milliGRAM(s) Oral every 6 hours PRN Mild Pain (1 - 3), Moderate Pain (4 - 6)  dextrose 40% Gel 15 Gram(s) Oral once PRN Blood Glucose LESS THAN 70 milliGRAM(s)/deciliter  glucagon  Injectable 1 milliGRAM(s) IntraMuscular once PRN Glucose LESS THAN 70 milligrams/deciliter  guaifenesin/dextromethorphan  Syrup 200 milliLiter(s) Oral every 6 hours PRN couhgh  heparin  Injectable 5500 Unit(s) IV Push every 6 hours PRN For aPTT less than 40  heparin  Injectable 2500 Unit(s) IV Push every 6 hours PRN For aPTT between 40 - 57      T(C): 37 (11-10-19 @ 16:56), Max: 37 (19 @ 21:36)  HR: 88 (11-10-19 @ 16:56) (88 - 100)  BP: 122/58 (11-10-19 @ 16:56) (94/51 - 122/58)  RR: 17 (11-10-19 @ 16:56) (17 - 18)  SpO2: 98% (11-10-19 @ 16:56) (98% - 99%)    CAPILLARY BLOOD GLUCOSE      POCT Blood Glucose.: 81 mg/dL (10 Nov 2019 16:47)  POCT Blood Glucose.: 94 mg/dL (10 Nov 2019 12:17)  POCT Blood Glucose.: 101 mg/dL (10 Nov 2019 07:39)  POCT Blood Glucose.: 102 mg/dL (2019 21:51)  POCT Blood Glucose.: 98 mg/dL (2019 17:09)    I&O's Summary    2019 07:01  -  10 Nov 2019 07:00  --------------------------------------------------------  IN: 480 mL / OUT: 305 mL / NET: 175 mL        GENERAL: No acute distress, well-developed  HEAD:  Atraumatic, Normocephalic  ENT: EOMI, PERRLA, No JVD, moist mucosa  CHEST/LUNG: Clear to auscultation bilaterally  HEART: Regular rate and rhythm; No murmurs, rubs, or gallops  ABDOMEN: Soft, LLQ tenderness, Nondistended; Bowel sounds present. +BASIM drain  < from: CT Abdomen and Pelvis w/ IV Cont (19 @ 23:52) >  No evidence of acute diverticulitis, as clinically questioned.    Increased size left ventral wall subcutaneous collection now measuring   4.5 cm. Please correlate clinically for superimposed infection.    < end of copied text >  EXTREMITIES:  No clubbing, cyanosis, or edema  PSYCH: Nl behavior, nl affect  NEUROLOGY: AAOx3, non-focal, cranial nerves intact  SKIN: Normal color, No rashes or lesions    LABS:                        8.5    11.39 )-----------( 282      ( 10 Nov 2019 06:40 )             26.1     11-10    138  |  102  |  14  ----------------------------<  81  3.1<L>   |  24  |  0.59    Ca    8.2<L>      10 Nov 2019 06:49    TPro  5.7<L>  /  Alb  2.2<L>  /  TBili  < 0.2<L>  /  DBili  x   /  AST  13  /  ALT  7   /  AlkPhos  89  11-10  PT/INR - ( 2019 18:54 )   PT: 14.5 SEC;   INR: 1.30     PTT - ( 10 Nov 2019 10:35 )  PTT:109.6 SEC      Urinalysis Basic - ( 2019 21:02 )  Color: YELLOW / Appearance: Lt TURBID / S.023 / pH: 7.0  Gluc: NEGATIVE / Ketone: NEGATIVE  / Bili: NEGATIVE / Urobili: NORMAL   Blood: MODERATE / Protein: 20 / Nitrite: NEGATIVE   Leuk Esterase: LARGE / RBC: >50 / WBC >50   Sq Epi: MANY / Non Sq Epi: x / Bacteria: SMALL      RADIOLOGY & ADDITIONAL TESTS:  Imaging Personally Reviewed. CT A/P: No evidence of acute diverticulitis. Increased size left ventral wall subcutaneous collection now measuring 4.5 cm.    Consultant(s) Notes Reviewed:  [x ] YES  [ ] NO: ID  Care Discussed with Consultants/Other Providers [ x] YES  [ ] NO: PA

## 2019-11-10 NOTE — PROVIDER CONTACT NOTE (CRITICAL VALUE NOTIFICATION) - BACKGROUND
73yo F with hx of asthma, COPD, CHF, Afib, PPM, perforated diverticulitis. Admitted for abdominal pain and multiple abdominal abscesses.

## 2019-11-10 NOTE — PROGRESS NOTE ADULT - PROBLEM SELECTOR PROBLEM 5
Diabetes Asthma Type 2 diabetes mellitus without complication, without long-term current use of insulin

## 2019-11-10 NOTE — PROVIDER CONTACT NOTE (CRITICAL VALUE NOTIFICATION) - ASSESSMENT
Patient A&Ox4. Vital signs within normal patient limits. Patient denies any pain or SOB. No acute s/s of distress noted.

## 2019-11-10 NOTE — PROVIDER CONTACT NOTE (CRITICAL VALUE NOTIFICATION) - RECOMMENDATIONS
Tele JANAY Ortiz made aware. Patient asymptomatic at this time. Follow heparin nomogram - stop for 60 minutes and decrease rate by 2.

## 2019-11-10 NOTE — PROGRESS NOTE ADULT - PROBLEM SELECTOR PLAN 2
cont eliquis for now, may need to hold when IR drain is planned Rate controlled, on Eliquis for AC  -c/w Heparin gtt for AC in preparation for IR drainage  -c/w Tele monitoring -plan for repletion  -trend BMP daily

## 2019-11-10 NOTE — PROGRESS NOTE ADULT - PROBLEM SELECTOR PLAN 5
not on meds, will check a1c, SS insulin for now -c/w Spiriva and Singulair Controlled with diet, no medications; Hgb A1c = 5.1%  -can likely d/c FS/ISS when patient is no longer NPO after procedure to monitor for hypoglycemia

## 2019-11-10 NOTE — PROGRESS NOTE ADULT - PROBLEM SELECTOR PLAN 3
chronic , not in acute failure, cont home meds No acute exacerbation, AICD in place  -however most recent TTE w/ normal LV function (9/2019)  -c/w home Lasix 40mg PO daily, Spironolactone 25mg PO daily, Enalapril 5mg PO daily  -monitor volume status Rate controlled, on Eliquis for AC  -c/w Heparin gtt for AC in preparation for IR drainage  -c/w Tele monitoring

## 2019-11-10 NOTE — PROGRESS NOTE ADULT - ASSESSMENT
74 year old female with a PMH of asthma, COPD, CHF, afib, PPM, recent admission for perforated diverticulitis, s/p colostomy, hospital course complicated by abdominal abscesses discharged with BASIM drain to rehab now admitted with abdominal pain, ct of a/p showing enlarging fluid collection concerning for abscess.

## 2019-11-10 NOTE — PROGRESS NOTE ADULT - PROBLEM SELECTOR PLAN 1
ct with no evidence of acute diverticulitis, Increased size left ventral wall subcutaneous collection now measuring 4.5 cm. Sx consulted, ID consulted, cont Ertapenem, will consult IR likely Monday for drainage. F/u cultures  F/u ID recs CT showing 4.5cm collection  -c/w Ertapenem 1g daily  -Surgery, ID input appreciated  -plan for IR drainage, will need to coordinate on Monday

## 2019-11-10 NOTE — PROGRESS NOTE ADULT - ASSESSMENT
74 year old female with asthma, COPD, CHF, Afib, PPM, was recently admitted and followed by ID for perforated diverticulitis in 7/2019 requiring ex-lap for feculent peritonitis and with colostomy and ileal conduit, with BASIM drain now presenting with LLQ pain.    Has been on ertapenem at rehab, R arm picc line  Remains afebrile  Leukocytosis  Ct with Increased size left ventral wall subcutaneous collection now measuring   4.5 cm  LLQ abscess    Recommend:  -Continue ertapenem  -IR evaluation for drainage and send for culture  -F/U blood cultures  -Trend WBC

## 2019-11-11 ENCOUNTER — INPATIENT (INPATIENT)
Facility: HOSPITAL | Age: 74
LOS: 10 days | Discharge: NURSING, REHAB OR LONG-TERM CARE | DRG: 863 | End: 2019-11-22
Attending: SURGERY | Admitting: SURGERY
Payer: MEDICARE

## 2019-11-11 ENCOUNTER — TRANSCRIPTION ENCOUNTER (OUTPATIENT)
Age: 74
End: 2019-11-11

## 2019-11-11 VITALS
OXYGEN SATURATION: 98 % | RESPIRATION RATE: 16 BRPM | TEMPERATURE: 98 F | HEART RATE: 90 BPM | DIASTOLIC BLOOD PRESSURE: 60 MMHG | SYSTOLIC BLOOD PRESSURE: 111 MMHG

## 2019-11-11 VITALS
HEART RATE: 98 BPM | OXYGEN SATURATION: 99 % | TEMPERATURE: 99 F | RESPIRATION RATE: 16 BRPM | DIASTOLIC BLOOD PRESSURE: 72 MMHG | SYSTOLIC BLOOD PRESSURE: 102 MMHG | WEIGHT: 138.01 LBS | HEIGHT: 62 IN

## 2019-11-11 DIAGNOSIS — Z90.49 ACQUIRED ABSENCE OF OTHER SPECIFIED PARTS OF DIGESTIVE TRACT: Chronic | ICD-10-CM

## 2019-11-11 DIAGNOSIS — L02.91 CUTANEOUS ABSCESS, UNSPECIFIED: ICD-10-CM

## 2019-11-11 LAB
ALBUMIN SERPL ELPH-MCNC: 2.5 G/DL — LOW (ref 3.3–5)
ALP SERPL-CCNC: 103 U/L — SIGNIFICANT CHANGE UP (ref 40–120)
ALT FLD-CCNC: 9 U/L — SIGNIFICANT CHANGE UP (ref 4–33)
ANION GAP SERPL CALC-SCNC: 13 MMO/L — SIGNIFICANT CHANGE UP (ref 7–14)
APTT BLD: 71.9 SEC — HIGH (ref 27.5–36.3)
APTT BLD: 93.1 SEC — HIGH (ref 27.5–36.3)
AST SERPL-CCNC: 16 U/L — SIGNIFICANT CHANGE UP (ref 4–32)
BACTERIA UR CULT: SIGNIFICANT CHANGE UP
BILIRUB SERPL-MCNC: < 0.2 MG/DL — LOW (ref 0.2–1.2)
BLD GP AB SCN SERPL QL: NEGATIVE — SIGNIFICANT CHANGE UP
BUN SERPL-MCNC: 10 MG/DL — SIGNIFICANT CHANGE UP (ref 7–23)
CALCIUM SERPL-MCNC: 8.7 MG/DL — SIGNIFICANT CHANGE UP (ref 8.4–10.5)
CHLORIDE SERPL-SCNC: 101 MMOL/L — SIGNIFICANT CHANGE UP (ref 98–107)
CO2 SERPL-SCNC: 21 MMOL/L — LOW (ref 22–31)
CREAT SERPL-MCNC: 0.58 MG/DL — SIGNIFICANT CHANGE UP (ref 0.5–1.3)
GLUCOSE BLDC GLUCOMTR-MCNC: 92 MG/DL — SIGNIFICANT CHANGE UP (ref 70–99)
GLUCOSE BLDC GLUCOMTR-MCNC: 99 MG/DL — SIGNIFICANT CHANGE UP (ref 70–99)
GLUCOSE SERPL-MCNC: 90 MG/DL — SIGNIFICANT CHANGE UP (ref 70–99)
HCT VFR BLD CALC: 29.2 % — LOW (ref 34.5–45)
HGB BLD-MCNC: 9.6 G/DL — LOW (ref 11.5–15.5)
INR BLD: 1.18 — HIGH (ref 0.88–1.17)
MAGNESIUM SERPL-MCNC: 1.7 MG/DL — SIGNIFICANT CHANGE UP (ref 1.6–2.6)
MCHC RBC-ENTMCNC: 32.9 % — SIGNIFICANT CHANGE UP (ref 32–36)
MCHC RBC-ENTMCNC: 33.8 PG — SIGNIFICANT CHANGE UP (ref 27–34)
MCV RBC AUTO: 102.8 FL — HIGH (ref 80–100)
NRBC # FLD: 0 K/UL — SIGNIFICANT CHANGE UP (ref 0–0)
PHOSPHATE SERPL-MCNC: 2.6 MG/DL — SIGNIFICANT CHANGE UP (ref 2.5–4.5)
PLATELET # BLD AUTO: 296 K/UL — SIGNIFICANT CHANGE UP (ref 150–400)
PMV BLD: 10.5 FL — SIGNIFICANT CHANGE UP (ref 7–13)
POTASSIUM SERPL-MCNC: 4 MMOL/L — SIGNIFICANT CHANGE UP (ref 3.5–5.3)
POTASSIUM SERPL-SCNC: 4 MMOL/L — SIGNIFICANT CHANGE UP (ref 3.5–5.3)
PROT SERPL-MCNC: 6.5 G/DL — SIGNIFICANT CHANGE UP (ref 6–8.3)
PROTHROM AB SERPL-ACNC: 13.2 SEC — HIGH (ref 9.8–13.1)
RBC # BLD: 2.84 M/UL — LOW (ref 3.8–5.2)
RBC # FLD: 17.9 % — HIGH (ref 10.3–14.5)
RH IG SCN BLD-IMP: POSITIVE — SIGNIFICANT CHANGE UP
SODIUM SERPL-SCNC: 135 MMOL/L — SIGNIFICANT CHANGE UP (ref 135–145)
SPECIMEN SOURCE: SIGNIFICANT CHANGE UP
WBC # BLD: 12.59 K/UL — HIGH (ref 3.8–10.5)
WBC # FLD AUTO: 12.59 K/UL — HIGH (ref 3.8–10.5)

## 2019-11-11 PROCEDURE — 99239 HOSP IP/OBS DSCHRG MGMT >30: CPT

## 2019-11-11 RX ORDER — HEPARIN SODIUM 5000 [USP'U]/ML
2500 INJECTION INTRAVENOUS; SUBCUTANEOUS
Qty: 0 | Refills: 0 | DISCHARGE
Start: 2019-11-11

## 2019-11-11 RX ORDER — ACETAMINOPHEN 500 MG
1000 TABLET ORAL EVERY 6 HOURS
Refills: 0 | Status: COMPLETED | OUTPATIENT
Start: 2019-11-11 | End: 2019-11-12

## 2019-11-11 RX ORDER — FOLIC ACID 0.8 MG
1 TABLET ORAL DAILY
Refills: 0 | Status: DISCONTINUED | OUTPATIENT
Start: 2019-11-11 | End: 2019-11-22

## 2019-11-11 RX ORDER — SODIUM CHLORIDE 9 MG/ML
1000 INJECTION, SOLUTION INTRAVENOUS
Refills: 0 | Status: DISCONTINUED | OUTPATIENT
Start: 2019-11-11 | End: 2019-11-22

## 2019-11-11 RX ORDER — HEPARIN SODIUM 5000 [USP'U]/ML
1000 INJECTION INTRAVENOUS; SUBCUTANEOUS
Qty: 25000 | Refills: 0 | Status: DISCONTINUED | OUTPATIENT
Start: 2019-11-11 | End: 2019-11-12

## 2019-11-11 RX ORDER — DARBEPOETIN ALFA IN POLYSORBAT 200MCG/0.4
150 PEN INJECTOR (ML) SUBCUTANEOUS
Qty: 0 | Refills: 0 | DISCHARGE

## 2019-11-11 RX ORDER — MORPHINE SULFATE 50 MG/1
2 CAPSULE, EXTENDED RELEASE ORAL
Refills: 0 | Status: DISCONTINUED | OUTPATIENT
Start: 2019-11-11 | End: 2019-11-11

## 2019-11-11 RX ORDER — HEPARIN SODIUM 5000 [USP'U]/ML
12 INJECTION INTRAVENOUS; SUBCUTANEOUS
Qty: 0 | Refills: 0 | DISCHARGE
Start: 2019-11-11

## 2019-11-11 RX ORDER — DEXTROSE 50 % IN WATER 50 %
12.5 SYRINGE (ML) INTRAVENOUS ONCE
Refills: 0 | Status: DISCONTINUED | OUTPATIENT
Start: 2019-11-11 | End: 2019-11-22

## 2019-11-11 RX ORDER — ASCORBIC ACID 60 MG
500 TABLET,CHEWABLE ORAL DAILY
Refills: 0 | Status: DISCONTINUED | OUTPATIENT
Start: 2019-11-11 | End: 2019-11-22

## 2019-11-11 RX ORDER — MORPHINE SULFATE 50 MG/1
2 CAPSULE, EXTENDED RELEASE ORAL
Qty: 0 | Refills: 0 | DISCHARGE
Start: 2019-11-11

## 2019-11-11 RX ORDER — GLUCAGON INJECTION, SOLUTION 0.5 MG/.1ML
1 INJECTION, SOLUTION SUBCUTANEOUS ONCE
Refills: 0 | Status: DISCONTINUED | OUTPATIENT
Start: 2019-11-11 | End: 2019-11-22

## 2019-11-11 RX ORDER — DEXTROSE 50 % IN WATER 50 %
25 SYRINGE (ML) INTRAVENOUS ONCE
Refills: 0 | Status: DISCONTINUED | OUTPATIENT
Start: 2019-11-11 | End: 2019-11-22

## 2019-11-11 RX ORDER — DEXTROSE MONOHYDRATE, SODIUM CHLORIDE, AND POTASSIUM CHLORIDE 50; .745; 4.5 G/1000ML; G/1000ML; G/1000ML
1000 INJECTION, SOLUTION INTRAVENOUS
Refills: 0 | Status: DISCONTINUED | OUTPATIENT
Start: 2019-11-11 | End: 2019-11-12

## 2019-11-11 RX ORDER — DEXTROSE 50 % IN WATER 50 %
15 SYRINGE (ML) INTRAVENOUS ONCE
Refills: 0 | Status: DISCONTINUED | OUTPATIENT
Start: 2019-11-11 | End: 2019-11-22

## 2019-11-11 RX ORDER — FAMOTIDINE 10 MG/ML
20 INJECTION INTRAVENOUS DAILY
Refills: 0 | Status: DISCONTINUED | OUTPATIENT
Start: 2019-11-11 | End: 2019-11-22

## 2019-11-11 RX ORDER — DEXTROSE MONOHYDRATE, SODIUM CHLORIDE, AND POTASSIUM CHLORIDE 50; .745; 4.5 G/1000ML; G/1000ML; G/1000ML
1000 INJECTION, SOLUTION INTRAVENOUS
Refills: 0 | Status: COMPLETED | OUTPATIENT
Start: 2019-11-11 | End: 2020-10-09

## 2019-11-11 RX ORDER — ERTAPENEM SODIUM 1 G/1
1000 INJECTION, POWDER, LYOPHILIZED, FOR SOLUTION INTRAMUSCULAR; INTRAVENOUS EVERY 24 HOURS
Refills: 0 | Status: DISCONTINUED | OUTPATIENT
Start: 2019-11-11 | End: 2019-11-22

## 2019-11-11 RX ORDER — HEPARIN SODIUM 5000 [USP'U]/ML
5500 INJECTION INTRAVENOUS; SUBCUTANEOUS
Qty: 0 | Refills: 0 | DISCHARGE
Start: 2019-11-11

## 2019-11-11 RX ORDER — LORATADINE 10 MG/1
10 TABLET ORAL DAILY
Refills: 0 | Status: DISCONTINUED | OUTPATIENT
Start: 2019-11-11 | End: 2019-11-22

## 2019-11-11 RX ORDER — MONTELUKAST 4 MG/1
10 TABLET, CHEWABLE ORAL DAILY
Refills: 0 | Status: DISCONTINUED | OUTPATIENT
Start: 2019-11-11 | End: 2019-11-22

## 2019-11-11 RX ORDER — ACETAMINOPHEN 500 MG
2 TABLET ORAL
Qty: 0 | Refills: 0 | DISCHARGE
Start: 2019-11-11

## 2019-11-11 RX ADMIN — Medication 325 MILLIGRAM(S): at 17:01

## 2019-11-11 RX ADMIN — LORATADINE 10 MILLIGRAM(S): 10 TABLET ORAL at 11:12

## 2019-11-11 RX ADMIN — HEPARIN SODIUM 1000 UNIT(S)/HR: 5000 INJECTION INTRAVENOUS; SUBCUTANEOUS at 01:50

## 2019-11-11 RX ADMIN — HEPARIN SODIUM 1000 UNIT(S)/HR: 5000 INJECTION INTRAVENOUS; SUBCUTANEOUS at 08:15

## 2019-11-11 RX ADMIN — TIOTROPIUM BROMIDE 1 CAPSULE(S): 18 CAPSULE ORAL; RESPIRATORY (INHALATION) at 11:12

## 2019-11-11 RX ADMIN — Medication 500 MILLIGRAM(S): at 11:12

## 2019-11-11 RX ADMIN — FAMOTIDINE 20 MILLIGRAM(S): 10 INJECTION INTRAVENOUS at 11:12

## 2019-11-11 RX ADMIN — SPIRONOLACTONE 25 MILLIGRAM(S): 25 TABLET, FILM COATED ORAL at 05:52

## 2019-11-11 RX ADMIN — Medication 325 MILLIGRAM(S): at 05:52

## 2019-11-11 RX ADMIN — ERTAPENEM SODIUM 120 MILLIGRAM(S): 1 INJECTION, POWDER, LYOPHILIZED, FOR SOLUTION INTRAMUSCULAR; INTRAVENOUS at 14:45

## 2019-11-11 RX ADMIN — Medication 1 MILLIGRAM(S): at 11:12

## 2019-11-11 RX ADMIN — MONTELUKAST 10 MILLIGRAM(S): 4 TABLET, CHEWABLE ORAL at 11:12

## 2019-11-11 RX ADMIN — CHLORHEXIDINE GLUCONATE 1 APPLICATION(S): 213 SOLUTION TOPICAL at 11:12

## 2019-11-11 NOTE — CHART NOTE - NSCHARTNOTEFT_GEN_A_CORE
I spoke with Dr. Chavez earlier and she has accepted the patient for transfer to [facility]  I called the [facility] Transfer Center and initiated transfer from [facility] to [facility].  Transfer Center to call me back after confirmation of acceptance by Dr. Alvarado.  Medicine team aware.

## 2019-11-11 NOTE — PROGRESS NOTE ADULT - PROBLEM SELECTOR PLAN 3
Rate controlled, on Eliquis for AC at home  -c/w Heparin gtt for AC in preparation for IR drainage  -c/w Tele monitoring

## 2019-11-11 NOTE — PROGRESS NOTE ADULT - SUBJECTIVE AND OBJECTIVE BOX
SURGERY PROGRESS NOTE    INTERVAL HPI/OVERNIGHT EVENTS: Patient complains of LLQ pain around the IR tube. No HA, CP, SOB, N/V. Has been NPO.     Vital Signs Last 24 Hrs  T(C): 36.7 (11 Nov 2019 05:49), Max: 37 (10 Nov 2019 16:56)  T(F): 98.1 (11 Nov 2019 05:49), Max: 98.6 (10 Nov 2019 16:56)  HR: 97 (11 Nov 2019 05:49) (88 - 100)  BP: 103/54 (11 Nov 2019 05:49) (94/51 - 122/58)  BP(mean): --  RR: 17 (11 Nov 2019 05:49) (17 - 17)  SpO2: 99% (11 Nov 2019 05:49) (98% - 100%)  MEDICATIONS  (STANDING):  ascorbic acid 500 milliGRAM(s) Oral daily  atorvastatin 20 milliGRAM(s) Oral at bedtime  chlorhexidine 4% Liquid 1 Application(s) Topical daily  darbepoetin Injectable Syringe 150 MICROGram(s) SubCutaneous <User Schedule>  dextrose 5%. 1000 milliLiter(s) (50 mL/Hr) IV Continuous <Continuous>  dextrose 50% Injectable 12.5 Gram(s) IV Push once  dextrose 50% Injectable 25 Gram(s) IV Push once  dextrose 50% Injectable 25 Gram(s) IV Push once  enalapril 5 milliGRAM(s) Oral daily  ertapenem  IVPB 1000 milliGRAM(s) IV Intermittent every 24 hours  famotidine    Tablet 20 milliGRAM(s) Oral daily  ferrous    sulfate 325 milliGRAM(s) Oral two times a day  folic acid 1 milliGRAM(s) Oral daily  furosemide    Tablet 40 milliGRAM(s) Oral daily  heparin  Infusion.  Unit(s)/Hr (12 mL/Hr) IV Continuous <Continuous>  influenza   Vaccine 0.5 milliLiter(s) IntraMuscular once  loratadine 10 milliGRAM(s) Oral daily  magnesium oxide 400 milliGRAM(s) Oral three times a day with meals  montelukast 10 milliGRAM(s) Oral daily  spironolactone 25 milliGRAM(s) Oral daily  tiotropium 18 MICROgram(s) Capsule 1 Capsule(s) Inhalation daily    MEDICATIONS  (PRN):  acetaminophen   Tablet .. 650 milliGRAM(s) Oral every 6 hours PRN Mild Pain (1 - 3), Moderate Pain (4 - 6)  dextrose 40% Gel 15 Gram(s) Oral once PRN Blood Glucose LESS THAN 70 milliGRAM(s)/deciliter  glucagon  Injectable 1 milliGRAM(s) IntraMuscular once PRN Glucose LESS THAN 70 milligrams/deciliter  guaifenesin/dextromethorphan  Syrup 200 milliLiter(s) Oral every 6 hours PRN couhgh  heparin  Injectable 5500 Unit(s) IV Push every 6 hours PRN For aPTT less than 40  heparin  Injectable 2500 Unit(s) IV Push every 6 hours PRN For aPTT between 40 - 57      PHYSICAL EXAM    General: NAD, lying in bed comfortably  Neuro: AAO  Resp: Good effort, on NC  GI/Abd: Soft, Exquisite LLQ TTP with slightest of touch, no rebound/guarding, no masses palpated. IR drain in place with serous fluid in bulb. Midline wound with aquacel dressing and enteric contents.   Musculoskeletal: All 4 extremities moving spontaneously, no limitations. RLE with dressing overlying skin graft donor site    I&O:  I&O's Detail    09 Nov 2019 07:01  -  10 Nov 2019 07:00  --------------------------------------------------------  IN:    Oral Fluid: 480 mL  Total IN: 480 mL    OUT:    Drain: 5 mL    Voided: 300 mL  Total OUT: 305 mL    Total NET: 175 mL          LABS:                        8.5    11.39 )-----------( 282      ( 10 Nov 2019 06:40 )             26.1     11-10    138  |  102  |  14  ----------------------------<  81  3.1<L>   |  24  |  0.59    Ca    8.2<L>      10 Nov 2019 06:49    TPro  5.7<L>  /  Alb  2.2<L>  /  TBili  < 0.2<L>  /  DBili  x   /  AST  13  /  ALT  7   /  AlkPhos  89  11-10    LIVER FUNCTIONS - ( 10 Nov 2019 06:49 )  Alb: 2.2 g/dL / Pro: 5.7 g/dL / ALK PHOS: 89 u/L / ALT: 7 u/L / AST: 13 u/L / GGT: x           PT/INR - ( 09 Nov 2019 18:54 )   PT: 14.5 SEC;   INR: 1.30          PTT - ( 11 Nov 2019 00:50 )  PTT:93.1 SEC      Impression: 74F s/p Lupe's for perforated sigmoid diverticulitis 7/3/19, multiple washouts, eventual skin wound closure with fascia open c/b sigmoid stump leak and 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 Now with     Plan:  Pain control  IR consultation for tube check  Wound care for midline wound and RLE skin graft site  Hep gtt  ABX

## 2019-11-11 NOTE — DISCHARGE NOTE PROVIDER - HOSPITAL COURSE
74F  w tih h/o of asthma, COPD, CHF, Afib, PPM s/p Lupe's for perforated sigmoid diverticulitis 7/3/19, s/p colostomy, hospital course complicatedby abd abscesses discharged with BASIM drain to rehab now adm with abd pain.. CT of A/P showed elarged fluid collection concern for abscesses.  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         Plan:    Pain control    IR consultation for tube check, abscesses drainage culture to be sent    Wound care for midline wound and RLE skin graft site    Hep gtt for Afib    ABX    Dr Alvarado will have patient transferred to [facility]. Patient is hemodynamically stable but in pain and patient is ready for discharge.  Case discussed and medications reviewed with patient and PMD.  Agreed with above.

## 2019-11-11 NOTE — CHART NOTE - NSCHARTNOTEFT_GEN_A_CORE
Call placed to Dr. Clark Alvarado, the patient's surgeon at [facility]  Voicemail left.

## 2019-11-11 NOTE — H&P ADULT - ASSESSMENT
74 year old female with a PMH of asthma, COPD, CHF, afib, PPM, recent admission for perforated diverticulitis, s/p colostomy, hospital course complicated by abdominal abscesses discharged with BASIM drain to rehab now admitted with abdominal pain, ct of a/p showing enlarging fluid collection concerning for abscess.    - NPO at midnight  - IVF  - Heparin gtt  - Discuss drainage with IR  - IV abx 74 year old female with a PMH of asthma, COPD, CHF, afib, PPM, recent admission for perforated diverticulitis, s/p colostomy, hospital course complicated by abdominal abscesses discharged with BASIM drain to rehab now admitted with abdominal pain, ct of a/p showing enlarging fluid collection concerning for abscess.    - NPO at midnight  - IVF  - Heparin gtt  - Discuss drainage with IR  - IV abx    Addendum:  Patient seen/examined by MIS/bariatric fellow. Agree with resident exam and assessment.  The patient complains of LLQ pain on palpation.  CT shows subcutaneous fluid collection over LLQ not in area of drain.  On heparin drip for A flutter.  Continue IV Invanz. Hold heparin drip this AM for possible IR drainage.  Danita Yo MD

## 2019-11-11 NOTE — DISCHARGE NOTE NURSING/CASE MANAGEMENT/SOCIAL WORK - PATIENT PORTAL LINK FT
You can access the FollowMyHealth Patient Portal offered by [facility] by registering at the following website: [email or web address]. By joining [employer]’s FollowMyHealth portal, you will also be able to view your health information using other applications (apps) compatible with our system.

## 2019-11-11 NOTE — PROGRESS NOTE ADULT - SUBJECTIVE AND OBJECTIVE BOX
PROGRESS NOTE:     Patient is a 74y old  Female who presents with a chief complaint of abdominal pain (11 Nov 2019 06:51)      SUBJECTIVE / OVERNIGHT EVENTS: pt c/o pains in llq near drain  ADDITIONAL REVIEW OF SYSTEMS: afebrile, denies chest pain/sob    MEDICATIONS  (STANDING):  ascorbic acid 500 milliGRAM(s) Oral daily  atorvastatin 20 milliGRAM(s) Oral at bedtime  chlorhexidine 4% Liquid 1 Application(s) Topical daily  darbepoetin Injectable Syringe 150 MICROGram(s) SubCutaneous <User Schedule>  dextrose 5%. 1000 milliLiter(s) (50 mL/Hr) IV Continuous <Continuous>  dextrose 50% Injectable 12.5 Gram(s) IV Push once  dextrose 50% Injectable 25 Gram(s) IV Push once  dextrose 50% Injectable 25 Gram(s) IV Push once  enalapril 5 milliGRAM(s) Oral daily  ertapenem  IVPB 1000 milliGRAM(s) IV Intermittent every 24 hours  famotidine    Tablet 20 milliGRAM(s) Oral daily  ferrous    sulfate 325 milliGRAM(s) Oral two times a day  folic acid 1 milliGRAM(s) Oral daily  furosemide    Tablet 40 milliGRAM(s) Oral daily  heparin  Infusion.  Unit(s)/Hr (12 mL/Hr) IV Continuous <Continuous>  influenza   Vaccine 0.5 milliLiter(s) IntraMuscular once  loratadine 10 milliGRAM(s) Oral daily  magnesium oxide 400 milliGRAM(s) Oral three times a day with meals  montelukast 10 milliGRAM(s) Oral daily  spironolactone 25 milliGRAM(s) Oral daily  tiotropium 18 MICROgram(s) Capsule 1 Capsule(s) Inhalation daily    MEDICATIONS  (PRN):  acetaminophen   Tablet .. 650 milliGRAM(s) Oral every 6 hours PRN Mild Pain (1 - 3), Moderate Pain (4 - 6)  dextrose 40% Gel 15 Gram(s) Oral once PRN Blood Glucose LESS THAN 70 milliGRAM(s)/deciliter  glucagon  Injectable 1 milliGRAM(s) IntraMuscular once PRN Glucose LESS THAN 70 milligrams/deciliter  guaifenesin/dextromethorphan  Syrup 200 milliLiter(s) Oral every 6 hours PRN couhgh  heparin  Injectable 5500 Unit(s) IV Push every 6 hours PRN For aPTT less than 40  heparin  Injectable 2500 Unit(s) IV Push every 6 hours PRN For aPTT between 40 - 57  morphine  - Injectable 2 milliGRAM(s) IV Push two times a day PRN Severe Pain (7 - 10)      CAPILLARY BLOOD GLUCOSE      POCT Blood Glucose.: 92 mg/dL (11 Nov 2019 12:02)  POCT Blood Glucose.: 99 mg/dL (11 Nov 2019 06:04)  POCT Blood Glucose.: 124 mg/dL (10 Nov 2019 23:34)  POCT Blood Glucose.: 75 mg/dL (10 Nov 2019 21:01)  POCT Blood Glucose.: 81 mg/dL (10 Nov 2019 16:47)    I&O's Summary      PHYSICAL EXAM:  Vital Signs Last 24 Hrs  T(C): 37.1 (11 Nov 2019 12:37), Max: 37.1 (11 Nov 2019 12:37)  T(F): 98.8 (11 Nov 2019 12:37), Max: 98.8 (11 Nov 2019 12:37)  HR: 93 (11 Nov 2019 12:37) (88 - 100)  BP: 119/58 (11 Nov 2019 12:37) (95/49 - 122/58)  BP(mean): --  RR: 17 (11 Nov 2019 12:37) (17 - 17)  SpO2: 100% (11 Nov 2019 12:37) (98% - 100%)    GENERAL: No acute distress, well-developed  HEAD:  Atraumatic, Normocephalic  ENT: EOMI, PERRLA, No JVD, moist mucosa  CHEST/LUNG: Clear to auscultation bilaterally  HEART: Regular rate and rhythm; No murmurs, rubs, or gallops  ABDOMEN: Soft, LLQ tenderness near BASIM drain, +right colostomy, lower abdomen wound dressing, Nondistended; Bowel sounds present. +left BASIM drain  EXTREMITIES:  No clubbing, cyanosis, or edema  PSYCH: Nl behavior, nl affect  NEUROLOGY: AAOx3, non-focal, cranial nerves intact  SKIN: Normal color, No rashes or lesions    LABS:                        9.6    12.59 )-----------( 296      ( 11 Nov 2019 06:46 )             29.2     11-11    135  |  101  |  10  ----------------------------<  90  4.0   |  21<L>  |  0.58    Ca    8.7      11 Nov 2019 06:46  Phos  2.6     11-11  Mg     1.7     11-11    TPro  6.5  /  Alb  2.5<L>  /  TBili  < 0.2<L>  /  DBili  x   /  AST  16  /  ALT  9   /  AlkPhos  103  11-11    PT/INR - ( 11 Nov 2019 06:46 )   PT: 13.2 SEC;   INR: 1.18          PTT - ( 11 Nov 2019 06:46 )  PTT:71.9 SEC          Culture - Urine (collected 08 Nov 2019 22:34)  Source: URINE MIDSTREAM  Final Report (11 Nov 2019 07:25):    CALB^Candida albicans    COLONY COUNT: 10,000-49,000 CFU/mL    Culture - Blood (collected 08 Nov 2019 20:04)  Source: BLOOD VENOUS  Preliminary Report (10 Nov 2019 20:05):    NO ORGANISMS ISOLATED    NO ORGANISMS ISOLATED AT 48 HRS.    Culture - Blood (collected 08 Nov 2019 20:04)  Source: BLOOD PERIPHERAL  Preliminary Report (10 Nov 2019 20:05):    NO ORGANISMS ISOLATED    NO ORGANISMS ISOLATED AT 48 HRS.        RADIOLOGY & ADDITIONAL TESTS:  Results Reviewed:   Imaging Personally Reviewed:  < from: CT Abdomen and Pelvis w/ IV Cont (11.08.19 @ 23:52) >  IMPRESSION:     No evidence of acute diverticulitis, as clinically questioned.    Increased size left ventral wall subcutaneous collection now measuring   4.5 cm. Please correlate clinically for superimposed infection.    Electrocardiogram Personally Reviewed:    COORDINATION OF CARE:  Care Discussed with Consultants/Other Providers [Y/N]: ayden Mckeon, transfer to [facility] surgical service   Prior or Outpatient Records Reviewed [Y/N]:

## 2019-11-11 NOTE — PROGRESS NOTE ADULT - PROBLEM SELECTOR PLAN 7
1.  Name of PCP:  2.  PCP Contacted on Admission: [ ] Y    [x ] N  sat adm from [place] to call  on monday  3.  PCP contacted at Discharge: [ ] Y    [ ] N    [ ] N/A  4.  Post-Discharge Appointment Date and Location:  5.  Summary of Handoff given to PCP:
1.  Name of PCP:  2.  PCP Contacted on Admission: [ ] Y    [x ] N  sat adm from [place] to call  on monday  3.  PCP contacted at Discharge: [ ] Y    [ ] N    [ ] N/A  4.  Post-Discharge Appointment Date and Location:  5.  Summary of Handoff given to PCP:

## 2019-11-11 NOTE — H&P ADULT - NSHPLABSRESULTS_GEN_ALL_CORE
MEDICATIONS  (STANDING):  ascorbic acid 500 milliGRAM(s) Oral daily  dextrose 5% + sodium chloride 0.45% with potassium chloride 20 mEq/L 1000 milliLiter(s) (40 mL/Hr) IV Continuous <Continuous>  dextrose 5%. 1000 milliLiter(s) (50 mL/Hr) IV Continuous <Continuous>  dextrose 50% Injectable 12.5 Gram(s) IV Push once  dextrose 50% Injectable 25 Gram(s) IV Push once  dextrose 50% Injectable 25 Gram(s) IV Push once  ertapenem  IVPB 1000 milliGRAM(s) IV Intermittent every 24 hours  famotidine    Tablet 20 milliGRAM(s) Oral daily  folic acid 1 milliGRAM(s) Oral daily  heparin  Infusion 1000 Unit(s)/Hr (10 mL/Hr) IV Continuous <Continuous>  loratadine 10 milliGRAM(s) Oral daily  montelukast 10 milliGRAM(s) Oral daily    MEDICATIONS  (PRN):  dextrose 40% Gel 15 Gram(s) Oral once PRN Blood Glucose LESS THAN 70 milliGRAM(s)/deciLiter  glucagon  Injectable 1 milliGRAM(s) IntraMuscular once PRN Glucose <70 milliGRAM(s)/deciLiter      OBJECTIVE:    Vital Signs Last 24 Hrs  T(C): 37 (11 Nov 2019 21:34), Max: 37.1 (11 Nov 2019 12:37)  T(F): 98.6 (11 Nov 2019 21:34), Max: 98.8 (11 Nov 2019 12:37)  HR: 100 (11 Nov 2019 21:34) (90 - 100)  BP: 102/67 (11 Nov 2019 21:34) (95/49 - 119/58)  BP(mean): --  RR: 16 (11 Nov 2019 21:34) (16 - 17)  SpO2: 95% (11 Nov 2019 21:34) (95% - 100%)        I&O's Detail      Daily Height in cm: 157.48 (11 Nov 2019 20:09)    Daily     LABS:                        9.6    12.59 )-----------( 296      ( 11 Nov 2019 06:46 )             29.2     11-11    135  |  101  |  10  ----------------------------<  90  4.0   |  21<L>  |  0.58    Ca    8.7      11 Nov 2019 06:46  Phos  2.6     11-11  Mg     1.7     11-11    TPro  6.5  /  Alb  2.5<L>  /  TBili  < 0.2<L>  /  DBili  x   /  AST  16  /  ALT  9   /  AlkPhos  103  11-11    PT/INR - ( 11 Nov 2019 06:46 )   PT: 13.2 SEC;   INR: 1.18          PTT - ( 11 Nov 2019 06:46 )  PTT:71.9 SEC          < from: CT Abdomen and Pelvis w/ IV Cont (11.08.19 @ 23:52) >    BOWEL: Delgado's pouch and right-sided colostomy. Normal appendix. No   bowel obstruction. Colonic diverticulosis. No bowel wall thickening or   acute inflammatory change.  PERITONEUM: Pigtail catheter in the left lower quadrant, with collapse   collection cavity. No free air or new abscess. No ascites.  VESSELS: Atherosclerotic changes.  RETROPERITONEUM/LYMPH NODES: No lymphadenopathy.    ABDOMINAL WALL: Large ventral hernia containing unobstructed bowel.   Persistent left ventral wall subcutaneous collection measuring 4.5 x 1.4   cm.  BONES: Degenerative changes.    IMPRESSION:     No evidence of acute diverticulitis, as clinically questioned.    Increased size left ventral wall subcutaneous collection now measuring   4.5 cm. Please correlate clinically for superimposed infection.    < end of copied text >

## 2019-11-11 NOTE — PROGRESS NOTE ADULT - PROBLEM SELECTOR PLAN 1
CT showing 4.5cm collection,   -c/w Ertapenem 1g daily  -f/u cultures (bcx 11/8 ngtd, ucx 11/8 candida 10k-49k likely colonization)  -Surgery, ID input appreciated, team d/w her surgeon at [facility] Dr. Clark Alvarado, plan to transfer pt to [facility] for further management   -needs IR drainage of collection/abscess and culture to guide abx therapy, will be done at [facility]

## 2019-11-11 NOTE — DISCHARGE NOTE PROVIDER - NSDCMRMEDTOKEN_GEN_ALL_CORE_FT
acetaminophen 325 mg oral tablet: 2 tab(s) orally every 6 hours, As needed, Mild Pain (1 - 3), Moderate Pain (4 - 6)  ascorbic acid 500 mg oral tablet: 1 tab(s) orally once a day  atorvastatin 20 mg oral tablet: 1 tab(s) orally once a day (at bedtime)  Claritin 10 mg oral tablet: 1 tab(s) orally once a day  enalapril 5 mg oral tablet: 1 tab(s) orally once a day  famotidine 20 mg oral tablet: 1 tab(s) orally once a day  ferrous sulfate 325 mg (65 mg elemental iron) oral tablet: 1 tab(s) orally 2 times a day  folic acid 1 mg oral tablet: 1 tab(s) orally once a day  furosemide 40 mg oral tablet: 1 tab(s) orally once a day  heparin: 5500 unit(s) injectable every 6 hours, As Needed   heparin: 2500 unit(s) injectable every 6 hours, As Needed 40-57  heparin 100 units/mL-D5% intravenous solution: 12 milliliter(s)hour intravenous   magnesium oxide 400 mg (241.3 mg elemental magnesium) oral tablet: 1 tab(s) orally 3 times a day (with meals)  montelukast 10 mg oral tablet: 1 tab(s) orally once a day  morphine: 2 milligram(s) injectable 2 times a day, As Needed  spironolactone 25 mg oral tablet: 1 tab(s) orally once a day

## 2019-11-11 NOTE — DISCHARGE NOTE PROVIDER - NSDCCPCAREPLAN_GEN_ALL_CORE_FT
PRINCIPAL DISCHARGE DIAGNOSIS  Diagnosis: Abdominal wall abscess  Assessment and Plan of Treatment: patient is on ertapenem Dr albarran may needs more washings drainage of abscesses.  Needs urgent transfer

## 2019-11-11 NOTE — H&P ADULT - HISTORY OF PRESENT ILLNESS
74 year old female with a PMH of asthma, COPD, CHF, afib, PPM, recent admission for perforated diverticulitis of the transverse colon 7/2019 which required an ex-lap for feculent peritonitis and an end transverse colostomy, hospital course complicated resulted in an open abdomen that was eventually closed on 7/11/19 with strattice mesh. Pt had further complication with abdominal abscesses discharged with BASIM drain to rehab now coming in with increased LLQ pain and pain at the BASIM drain site. Pt denies any nausea, vomiting, fever, chills, sob, chest pain, dysuria or any other complaints.   Pt is on Ertapenem at the rehab, PICC line on the Rt arm.

## 2019-11-11 NOTE — PROGRESS NOTE ADULT - PROBLEM SELECTOR PLAN 4
No acute exacerbation, AICD in place  -however most recent TTE w/ normal LV function (9/2019)  -c/w home Lasix 40mg PO daily, Spironolactone 25mg PO daily, Enalapril 5mg PO daily  -monitor volume status

## 2019-11-11 NOTE — H&P ADULT - NSICDXPASTMEDICALHX_GEN_ALL_CORE_FT
PAST MEDICAL HISTORY:  Asthma     Atrial flutter     Cardiac Pacemaker     Cardiomyopathy     Congestive Heart Failure     Diabetes     Diverticulitis     Gout     HTN - Hypertension     Kidney stone     Patient is Bahai     Refusal of blood transfusions as patient is Bahai     Vertigo

## 2019-11-11 NOTE — DISCHARGE NOTE PROVIDER - CARE PROVIDER_API CALL
Clark Alvarado)  Surgery  [address], [address]  [place], [place] [id number]  Phone: [phone]  Fax: [phone]  Follow Up Time:

## 2019-11-11 NOTE — H&P ADULT - NSICDXPASTSURGICALHX_GEN_ALL_CORE_FT
PAST SURGICAL HISTORY:  AICD (Automatic Cardioverter/Defibrillator) Present inserted in Aug, 2008. Due for battery change in 1 month. ( [employer]) . Inserted by Dr Duffy    S/P cholecystectomy     Status post left hemicolectomy

## 2019-11-11 NOTE — CHART NOTE - NSCHARTNOTEFT_GEN_A_CORE
Transfer center has called and confirmed that Dr. Clark Alvarado has accepted the patient at [facility]  Awaiting bed to become available before actual transfer completed  Medicine team aware  Green surgery service at [facility] called and this patient discussed. They are aware of pending transfer.

## 2019-11-11 NOTE — PROGRESS NOTE ADULT - ATTENDING COMMENTS
Tha Sesay MD  Pager [phone]  After 5pm/weekends call [phone]
dispo: transfer patient to [facility] today  Time spent on discharge 35 minutes coordinating discharge plan and discussing with patient and family.

## 2019-11-12 DIAGNOSIS — D64.9 ANEMIA, UNSPECIFIED: ICD-10-CM

## 2019-11-12 DIAGNOSIS — J45.909 UNSPECIFIED ASTHMA, UNCOMPLICATED: ICD-10-CM

## 2019-11-12 DIAGNOSIS — I48.92 UNSPECIFIED ATRIAL FLUTTER: ICD-10-CM

## 2019-11-12 DIAGNOSIS — Z78.9 OTHER SPECIFIED HEALTH STATUS: ICD-10-CM

## 2019-11-12 DIAGNOSIS — L02.211 CUTANEOUS ABSCESS OF ABDOMINAL WALL: ICD-10-CM

## 2019-11-12 DIAGNOSIS — I50.9 HEART FAILURE, UNSPECIFIED: ICD-10-CM

## 2019-11-12 DIAGNOSIS — E11.9 TYPE 2 DIABETES MELLITUS WITHOUT COMPLICATIONS: ICD-10-CM

## 2019-11-12 DIAGNOSIS — Z29.9 ENCOUNTER FOR PROPHYLACTIC MEASURES, UNSPECIFIED: ICD-10-CM

## 2019-11-12 LAB
ANION GAP SERPL CALC-SCNC: 9 MMOL/L — SIGNIFICANT CHANGE UP (ref 5–17)
APTT BLD: 145 SEC — CRITICAL HIGH (ref 27.5–36.3)
APTT BLD: 33.1 SEC — SIGNIFICANT CHANGE UP (ref 27.5–36.3)
APTT BLD: 72.1 SEC — HIGH (ref 27.5–36.3)
APTT BLD: 88.3 SEC — HIGH (ref 27.5–36.3)
BUN SERPL-MCNC: 7 MG/DL — SIGNIFICANT CHANGE UP (ref 7–23)
CALCIUM SERPL-MCNC: 9 MG/DL — SIGNIFICANT CHANGE UP (ref 8.4–10.5)
CHLORIDE SERPL-SCNC: 101 MMOL/L — SIGNIFICANT CHANGE UP (ref 96–108)
CO2 SERPL-SCNC: 23 MMOL/L — SIGNIFICANT CHANGE UP (ref 22–31)
CREAT SERPL-MCNC: 0.57 MG/DL — SIGNIFICANT CHANGE UP (ref 0.5–1.3)
GLUCOSE SERPL-MCNC: 85 MG/DL — SIGNIFICANT CHANGE UP (ref 70–99)
HCT VFR BLD CALC: 24 % — LOW (ref 34.5–45)
HCT VFR BLD CALC: 25 % — LOW (ref 34.5–45)
HCT VFR BLD CALC: 25 % — LOW (ref 34.5–45)
HCT VFR BLD CALC: 25.2 % — LOW (ref 34.5–45)
HGB BLD-MCNC: 8 G/DL — LOW (ref 11.5–15.5)
HGB BLD-MCNC: 8.1 G/DL — LOW (ref 11.5–15.5)
HGB BLD-MCNC: 8.1 G/DL — LOW (ref 11.5–15.5)
HGB BLD-MCNC: 8.3 G/DL — LOW (ref 11.5–15.5)
INR BLD: 1.21 RATIO — HIGH (ref 0.88–1.16)
MAGNESIUM SERPL-MCNC: 1.7 MG/DL — SIGNIFICANT CHANGE UP (ref 1.6–2.6)
MCHC RBC-ENTMCNC: 32.1 GM/DL — SIGNIFICANT CHANGE UP (ref 32–36)
MCHC RBC-ENTMCNC: 32.4 GM/DL — SIGNIFICANT CHANGE UP (ref 32–36)
MCHC RBC-ENTMCNC: 33.2 GM/DL — SIGNIFICANT CHANGE UP (ref 32–36)
MCHC RBC-ENTMCNC: 33.3 GM/DL — SIGNIFICANT CHANGE UP (ref 32–36)
MCHC RBC-ENTMCNC: 33.3 PG — SIGNIFICANT CHANGE UP (ref 27–34)
MCHC RBC-ENTMCNC: 33.5 PG — SIGNIFICANT CHANGE UP (ref 27–34)
MCHC RBC-ENTMCNC: 34 PG — SIGNIFICANT CHANGE UP (ref 27–34)
MCHC RBC-ENTMCNC: 34 PG — SIGNIFICANT CHANGE UP (ref 27–34)
MCV RBC AUTO: 100.8 FL — HIGH (ref 80–100)
MCV RBC AUTO: 102.1 FL — HIGH (ref 80–100)
MCV RBC AUTO: 102.9 FL — HIGH (ref 80–100)
MCV RBC AUTO: 105.9 FL — HIGH (ref 80–100)
NRBC # BLD: 0 /100 WBCS — SIGNIFICANT CHANGE UP (ref 0–0)
PHOSPHATE SERPL-MCNC: 4.3 MG/DL — SIGNIFICANT CHANGE UP (ref 2.5–4.5)
PLATELET # BLD AUTO: 270 K/UL — SIGNIFICANT CHANGE UP (ref 150–400)
PLATELET # BLD AUTO: 275 K/UL — SIGNIFICANT CHANGE UP (ref 150–400)
PLATELET # BLD AUTO: 276 K/UL — SIGNIFICANT CHANGE UP (ref 150–400)
PLATELET # BLD AUTO: 280 K/UL — SIGNIFICANT CHANGE UP (ref 150–400)
POTASSIUM SERPL-MCNC: 3.9 MMOL/L — SIGNIFICANT CHANGE UP (ref 3.5–5.3)
POTASSIUM SERPL-SCNC: 3.9 MMOL/L — SIGNIFICANT CHANGE UP (ref 3.5–5.3)
PROTHROM AB SERPL-ACNC: 13.7 SEC — HIGH (ref 10–13.1)
RBC # BLD: 2.35 M/UL — LOW (ref 3.8–5.2)
RBC # BLD: 2.38 M/UL — LOW (ref 3.8–5.2)
RBC # BLD: 2.43 M/UL — LOW (ref 3.8–5.2)
RBC # BLD: 2.48 M/UL — LOW (ref 3.8–5.2)
RBC # FLD: 17 % — HIGH (ref 10.3–14.5)
RBC # FLD: 17.4 % — HIGH (ref 10.3–14.5)
RBC # FLD: 17.5 % — HIGH (ref 10.3–14.5)
RBC # FLD: 17.7 % — HIGH (ref 10.3–14.5)
SODIUM SERPL-SCNC: 133 MMOL/L — LOW (ref 135–145)
WBC # BLD: 10.48 K/UL — SIGNIFICANT CHANGE UP (ref 3.8–10.5)
WBC # BLD: 10.74 K/UL — HIGH (ref 3.8–10.5)
WBC # BLD: 10.9 K/UL — HIGH (ref 3.8–10.5)
WBC # BLD: 13.39 K/UL — HIGH (ref 3.8–10.5)
WBC # FLD AUTO: 10.48 K/UL — SIGNIFICANT CHANGE UP (ref 3.8–10.5)
WBC # FLD AUTO: 10.74 K/UL — HIGH (ref 3.8–10.5)
WBC # FLD AUTO: 10.9 K/UL — HIGH (ref 3.8–10.5)
WBC # FLD AUTO: 13.39 K/UL — HIGH (ref 3.8–10.5)

## 2019-11-12 PROCEDURE — 85027 COMPLETE CBC AUTOMATED: CPT

## 2019-11-12 PROCEDURE — 82435 ASSAY OF BLOOD CHLORIDE: CPT

## 2019-11-12 PROCEDURE — 87040 BLOOD CULTURE FOR BACTERIA: CPT

## 2019-11-12 PROCEDURE — 82803 BLOOD GASES ANY COMBINATION: CPT

## 2019-11-12 PROCEDURE — 85610 PROTHROMBIN TIME: CPT

## 2019-11-12 PROCEDURE — 87205 SMEAR GRAM STAIN: CPT

## 2019-11-12 PROCEDURE — 83550 IRON BINDING TEST: CPT

## 2019-11-12 PROCEDURE — 75984 XRAY CONTROL CATHETER CHANGE: CPT

## 2019-11-12 PROCEDURE — 80048 BASIC METABOLIC PNL TOTAL CA: CPT

## 2019-11-12 PROCEDURE — 82728 ASSAY OF FERRITIN: CPT

## 2019-11-12 PROCEDURE — 82668 ASSAY OF ERYTHROPOIETIN: CPT

## 2019-11-12 PROCEDURE — 36569 INSJ PICC 5 YR+ W/O IMAGING: CPT

## 2019-11-12 PROCEDURE — 71045 X-RAY EXAM CHEST 1 VIEW: CPT

## 2019-11-12 PROCEDURE — 82947 ASSAY GLUCOSE BLOOD QUANT: CPT

## 2019-11-12 PROCEDURE — 82607 VITAMIN B-12: CPT

## 2019-11-12 PROCEDURE — 96374 THER/PROPH/DIAG INJ IV PUSH: CPT | Mod: XU

## 2019-11-12 PROCEDURE — 87070 CULTURE OTHR SPECIMN AEROBIC: CPT

## 2019-11-12 PROCEDURE — 82746 ASSAY OF FOLIC ACID SERUM: CPT

## 2019-11-12 PROCEDURE — 82330 ASSAY OF CALCIUM: CPT

## 2019-11-12 PROCEDURE — 97164 PT RE-EVAL EST PLAN CARE: CPT

## 2019-11-12 PROCEDURE — 87150 DNA/RNA AMPLIFIED PROBE: CPT

## 2019-11-12 PROCEDURE — C1729: CPT

## 2019-11-12 PROCEDURE — 84132 ASSAY OF SERUM POTASSIUM: CPT

## 2019-11-12 PROCEDURE — 85045 AUTOMATED RETICULOCYTE COUNT: CPT

## 2019-11-12 PROCEDURE — 49423 EXCHANGE DRAINAGE CATHETER: CPT

## 2019-11-12 PROCEDURE — 97110 THERAPEUTIC EXERCISES: CPT

## 2019-11-12 PROCEDURE — 84295 ASSAY OF SERUM SODIUM: CPT

## 2019-11-12 PROCEDURE — 80202 ASSAY OF VANCOMYCIN: CPT

## 2019-11-12 PROCEDURE — C1769: CPT

## 2019-11-12 PROCEDURE — 96375 TX/PRO/DX INJ NEW DRUG ADDON: CPT | Mod: XU

## 2019-11-12 PROCEDURE — C1889: CPT

## 2019-11-12 PROCEDURE — 97606 NEG PRS WND THER DME>50 SQCM: CPT

## 2019-11-12 PROCEDURE — 36573 INSJ PICC RS&I 5 YR+: CPT

## 2019-11-12 PROCEDURE — 94640 AIRWAY INHALATION TREATMENT: CPT

## 2019-11-12 PROCEDURE — 84100 ASSAY OF PHOSPHORUS: CPT

## 2019-11-12 PROCEDURE — C1751: CPT

## 2019-11-12 PROCEDURE — 99223 1ST HOSP IP/OBS HIGH 75: CPT

## 2019-11-12 PROCEDURE — 85014 HEMATOCRIT: CPT

## 2019-11-12 PROCEDURE — 97602 WOUND(S) CARE NON-SELECTIVE: CPT

## 2019-11-12 PROCEDURE — 83605 ASSAY OF LACTIC ACID: CPT

## 2019-11-12 PROCEDURE — 82962 GLUCOSE BLOOD TEST: CPT

## 2019-11-12 PROCEDURE — 80053 COMPREHEN METABOLIC PANEL: CPT

## 2019-11-12 PROCEDURE — 93005 ELECTROCARDIOGRAM TRACING: CPT

## 2019-11-12 PROCEDURE — 85730 THROMBOPLASTIN TIME PARTIAL: CPT

## 2019-11-12 PROCEDURE — 87186 SC STD MICRODIL/AGAR DIL: CPT

## 2019-11-12 PROCEDURE — 97162 PT EVAL MOD COMPLEX 30 MIN: CPT

## 2019-11-12 PROCEDURE — 83540 ASSAY OF IRON: CPT

## 2019-11-12 PROCEDURE — 99285 EMERGENCY DEPT VISIT HI MDM: CPT | Mod: 25

## 2019-11-12 PROCEDURE — 93306 TTE W/DOPPLER COMPLETE: CPT

## 2019-11-12 PROCEDURE — 87075 CULTR BACTERIA EXCEPT BLOOD: CPT

## 2019-11-12 PROCEDURE — 81001 URINALYSIS AUTO W/SCOPE: CPT

## 2019-11-12 PROCEDURE — 82565 ASSAY OF CREATININE: CPT

## 2019-11-12 PROCEDURE — 97530 THERAPEUTIC ACTIVITIES: CPT

## 2019-11-12 PROCEDURE — 83735 ASSAY OF MAGNESIUM: CPT

## 2019-11-12 PROCEDURE — 74177 CT ABD & PELVIS W/CONTRAST: CPT

## 2019-11-12 PROCEDURE — 97605 NEG PRS WND THER DME<=50SQCM: CPT

## 2019-11-12 PROCEDURE — 99232 SBSQ HOSP IP/OBS MODERATE 35: CPT | Mod: GC

## 2019-11-12 PROCEDURE — 99222 1ST HOSP IP/OBS MODERATE 55: CPT

## 2019-11-12 RX ORDER — INSULIN LISPRO 100/ML
VIAL (ML) SUBCUTANEOUS EVERY 6 HOURS
Refills: 0 | Status: DISCONTINUED | OUTPATIENT
Start: 2019-11-12 | End: 2019-11-12

## 2019-11-12 RX ORDER — HEPARIN SODIUM 5000 [USP'U]/ML
1000 INJECTION INTRAVENOUS; SUBCUTANEOUS
Qty: 25000 | Refills: 0 | Status: DISCONTINUED | OUTPATIENT
Start: 2019-11-12 | End: 2019-11-13

## 2019-11-12 RX ORDER — CHLORHEXIDINE GLUCONATE 213 G/1000ML
1 SOLUTION TOPICAL
Refills: 0 | Status: DISCONTINUED | OUTPATIENT
Start: 2019-11-12 | End: 2019-11-22

## 2019-11-12 RX ORDER — SODIUM CHLORIDE 9 MG/ML
10 INJECTION INTRAMUSCULAR; INTRAVENOUS; SUBCUTANEOUS
Refills: 0 | Status: DISCONTINUED | OUTPATIENT
Start: 2019-11-12 | End: 2019-11-22

## 2019-11-12 RX ORDER — HEPARIN SODIUM 5000 [USP'U]/ML
1200 INJECTION INTRAVENOUS; SUBCUTANEOUS
Qty: 25000 | Refills: 0 | Status: DISCONTINUED | OUTPATIENT
Start: 2019-11-12 | End: 2019-11-12

## 2019-11-12 RX ORDER — INSULIN LISPRO 100/ML
VIAL (ML) SUBCUTANEOUS
Refills: 0 | Status: DISCONTINUED | OUTPATIENT
Start: 2019-11-12 | End: 2019-11-13

## 2019-11-12 RX ADMIN — MONTELUKAST 10 MILLIGRAM(S): 4 TABLET, CHEWABLE ORAL at 12:39

## 2019-11-12 RX ADMIN — Medication 400 MILLIGRAM(S): at 00:07

## 2019-11-12 RX ADMIN — Medication 1000 MILLIGRAM(S): at 07:03

## 2019-11-12 RX ADMIN — Medication 1 MILLIGRAM(S): at 12:38

## 2019-11-12 RX ADMIN — Medication 1000 MILLIGRAM(S): at 00:40

## 2019-11-12 RX ADMIN — FAMOTIDINE 20 MILLIGRAM(S): 10 INJECTION INTRAVENOUS at 12:38

## 2019-11-12 RX ADMIN — Medication 400 MILLIGRAM(S): at 17:58

## 2019-11-12 RX ADMIN — Medication 1000 MILLIGRAM(S): at 12:35

## 2019-11-12 RX ADMIN — Medication 400 MILLIGRAM(S): at 06:33

## 2019-11-12 RX ADMIN — HEPARIN SODIUM 12 UNIT(S)/HR: 5000 INJECTION INTRAVENOUS; SUBCUTANEOUS at 01:05

## 2019-11-12 RX ADMIN — HEPARIN SODIUM 10 UNIT(S)/HR: 5000 INJECTION INTRAVENOUS; SUBCUTANEOUS at 00:08

## 2019-11-12 RX ADMIN — DEXTROSE MONOHYDRATE, SODIUM CHLORIDE, AND POTASSIUM CHLORIDE 40 MILLILITER(S): 50; .745; 4.5 INJECTION, SOLUTION INTRAVENOUS at 00:07

## 2019-11-12 RX ADMIN — Medication 400 MILLIGRAM(S): at 12:17

## 2019-11-12 RX ADMIN — HEPARIN SODIUM 10 UNIT(S)/HR: 5000 INJECTION INTRAVENOUS; SUBCUTANEOUS at 17:56

## 2019-11-12 RX ADMIN — ERTAPENEM SODIUM 120 MILLIGRAM(S): 1 INJECTION, POWDER, LYOPHILIZED, FOR SOLUTION INTRAMUSCULAR; INTRAVENOUS at 00:07

## 2019-11-12 RX ADMIN — Medication 1000 MILLIGRAM(S): at 18:20

## 2019-11-12 RX ADMIN — ERTAPENEM SODIUM 120 MILLIGRAM(S): 1 INJECTION, POWDER, LYOPHILIZED, FOR SOLUTION INTRAMUSCULAR; INTRAVENOUS at 23:05

## 2019-11-12 RX ADMIN — Medication 500 MILLIGRAM(S): at 12:39

## 2019-11-12 RX ADMIN — HEPARIN SODIUM 10 UNIT(S)/HR: 5000 INJECTION INTRAVENOUS; SUBCUTANEOUS at 14:41

## 2019-11-12 RX ADMIN — LORATADINE 10 MILLIGRAM(S): 10 TABLET ORAL at 12:39

## 2019-11-12 NOTE — CONSULT NOTE ADULT - PROBLEM SELECTOR RECOMMENDATION 6
last A1C 5.1.  may stop SSI if fs well controlled.  I am more concerned about hypoglycemia given NPO status than hyperglycemia.  Hypoglycemia protocol

## 2019-11-12 NOTE — CONSULT NOTE ADULT - ASSESSMENT
74 female with abdominal wall abscess s/p complicated surgical course after perforated diverticulitis in 7/2019.

## 2019-11-12 NOTE — PROGRESS NOTE ADULT - SUBJECTIVE AND OBJECTIVE BOX
SURGERY DAILY PROGRESS NOTE:       SUBJECTIVE/ROS: Patient examined at bedside. endorses LLQ pain around drain site.   Denies nausea, vomiting, chest pain, shortness of breath         MEDICATIONS  (STANDING):  acetaminophen  IVPB .. 1000 milliGRAM(s) IV Intermittent every 6 hours  ascorbic acid 500 milliGRAM(s) Oral daily  dextrose 5% + sodium chloride 0.45% with potassium chloride 20 mEq/L 1000 milliLiter(s) (40 mL/Hr) IV Continuous <Continuous>  dextrose 5%. 1000 milliLiter(s) (50 mL/Hr) IV Continuous <Continuous>  dextrose 50% Injectable 12.5 Gram(s) IV Push once  dextrose 50% Injectable 25 Gram(s) IV Push once  dextrose 50% Injectable 25 Gram(s) IV Push once  ertapenem  IVPB 1000 milliGRAM(s) IV Intermittent every 24 hours  famotidine    Tablet 20 milliGRAM(s) Oral daily  folic acid 1 milliGRAM(s) Oral daily  heparin  Infusion 1200 Unit(s)/Hr (12 mL/Hr) IV Continuous <Continuous>  insulin lispro (HumaLOG) corrective regimen sliding scale   SubCutaneous every 6 hours  loratadine 10 milliGRAM(s) Oral daily  montelukast 10 milliGRAM(s) Oral daily    MEDICATIONS  (PRN):  dextrose 40% Gel 15 Gram(s) Oral once PRN Blood Glucose LESS THAN 70 milliGRAM(s)/deciLiter  glucagon  Injectable 1 milliGRAM(s) IntraMuscular once PRN Glucose <70 milliGRAM(s)/deciLiter      OBJECTIVE:    Vital Signs Last 24 Hrs  T(C): 37.1 (12 Nov 2019 00:25), Max: 37.1 (11 Nov 2019 12:37)  T(F): 98.8 (12 Nov 2019 00:25), Max: 98.8 (11 Nov 2019 12:37)  HR: 96 (12 Nov 2019 00:25) (90 - 100)  BP: 100/74 (12 Nov 2019 00:25) (96/62 - 119/58)  BP(mean): --  RR: 16 (12 Nov 2019 00:25) (16 - 17)  SpO2: 96% (12 Nov 2019 00:25) (95% - 100%)        I&O's Detail      Daily Height in cm: 157.48 (11 Nov 2019 20:09)    Daily     LABS:                        8.1    10.90 )-----------( 275      ( 12 Nov 2019 00:26 )             25.0     11-11    135  |  101  |  10  ----------------------------<  90  4.0   |  21<L>  |  0.58    Ca    8.7      11 Nov 2019 06:46  Phos  2.6     11-11  Mg     1.7     11-11    TPro  6.5  /  Alb  2.5<L>  /  TBili  < 0.2<L>  /  DBili  x   /  AST  16  /  ALT  9   /  AlkPhos  103  11-11    PT/INR - ( 11 Nov 2019 06:46 )   PT: 13.2 SEC;   INR: 1.18          PTT - ( 12 Nov 2019 00:26 )  PTT:33.1 sec                  PHYSICAL EXAM:  	HEAD:  Atraumatic, Normocephalic  	ENT: EOMI, PERRLA, No JVD, moist mucosa  	CHEST/LUNG: Clear to auscultation bilaterally  	HEART: Regular rate and rhythm; No murmurs, rubs, or gallops  	ABDOMEN: Soft, LLQ tenderness near BASIM drain, fluctuance around site, , lower abdomen wound dressing with purulent drainage, Nondistended  	EXTREMITIES:  No clubbing, cyanosis, or edema  	PSYCH: Nl behavior, nl affect  	NEUROLOGY: AAOx3, non-focal, cranial nerves intact  SKIN: Normal color, No rashes or lesions

## 2019-11-12 NOTE — CHART NOTE - NSCHARTNOTEFT_GEN_A_CORE
IR Brief Consult Note. Imaging and chart reviewed.    LLQ abdominal wall collection looks small, drainable/fluid component only a few cc. Will perform ultrasound later today in IR, and if feasible, will aspirate vs drain (unlikely) with local only. No need for NPO from IR standpoint.

## 2019-11-12 NOTE — CONSULT NOTE ADULT - SUBJECTIVE AND OBJECTIVE BOX
Patient is a 74y old  Female who presents with a chief complaint of abdominal Abscess (12 Nov 2019 04:04)        HPI:  74 year old female with a PMH of asthma, COPD, CHF, afib on Eliquis, PPM, recent admission for perforated diverticulitis of the transverse colon 7/2019 which required an ex-lap for feculent peritonitis and an end transverse colostomy, hospital course complicated resulted in an open abdomen that was eventually closed on 7/11/19 with strattice mesh. Pt had further complication with abdominal abscesses discharged with BASIM drain to rehab now coming in with increased LLQ pain and pain at the BASIM drain site. Pt denies any nausea, vomiting, fever, chills, sob, chest pain, dysuria or any other complaints.   Pt is on Ertapenem at the rehab, PICC line on the Rt arm. (11 Nov 2019 22:08).  CT shows 4.5cm collection - IR to further evaluate for possible drainage.  ID at [facility] recommended continue Ertapenem.  On Heparin drip for possible IR drainage; revert to Eliquis once no more procedures/surgeries planned.  Patient denies any SOB, CP, palpitations, N/V/D.        PAST MEDICAL & SURGICAL HISTORY:  Refusal of blood transfusions as patient is [employer]  Patient is [employer]  Vertigo  Atrial flutter  Diverticulitis  Cardiomyopathy  Kidney stone  Cardiac Pacemaker  HTN - Hypertension  Gout  Diabetes  Congestive Heart Failure  Asthma  Status post left hemicolectomy  S/P cholecystectomy  AICD (Automatic Cardioverter/Defibrillator) Present: inserted in Aug, 2008. Due for battery change in 1 month. ( [employer]) . Inserted by Dr Duffy      REVIEW OF SYSTEMS:    negative unless otherwise specified in HPI.      MEDICATIONS  (STANDING):  acetaminophen  IVPB .. 1000 milliGRAM(s) IV Intermittent every 6 hours  ascorbic acid 500 milliGRAM(s) Oral daily  chlorhexidine 2% Cloths 1 Application(s) Topical <User Schedule>  dextrose 5% + sodium chloride 0.45% with potassium chloride 20 mEq/L 1000 milliLiter(s) (40 mL/Hr) IV Continuous <Continuous>  dextrose 5%. 1000 milliLiter(s) (50 mL/Hr) IV Continuous <Continuous>  dextrose 50% Injectable 12.5 Gram(s) IV Push once  dextrose 50% Injectable 25 Gram(s) IV Push once  dextrose 50% Injectable 25 Gram(s) IV Push once  ertapenem  IVPB 1000 milliGRAM(s) IV Intermittent every 24 hours  famotidine    Tablet 20 milliGRAM(s) Oral daily  folic acid 1 milliGRAM(s) Oral daily  heparin  Infusion 1200 Unit(s)/Hr (12 mL/Hr) IV Continuous <Continuous>  insulin lispro (HumaLOG) corrective regimen sliding scale   SubCutaneous every 6 hours  loratadine 10 milliGRAM(s) Oral daily  montelukast 10 milliGRAM(s) Oral daily    MEDICATIONS  (PRN):  dextrose 40% Gel 15 Gram(s) Oral once PRN Blood Glucose LESS THAN 70 milliGRAM(s)/deciLiter  glucagon  Injectable 1 milliGRAM(s) IntraMuscular once PRN Glucose <70 milliGRAM(s)/deciLiter  sodium chloride 0.9% lock flush 10 milliLiter(s) IV Push every 1 hour PRN Pre/post blood products, medications, blood draw, and to maintain line patency      Allergies    Coreg (Other)  digoxin (Other; Short breath (Mild to Mod))  IV Contrast (Unknown)  penicillins (Hives)    Intolerances    metoprolol (Other)  Solu-Medrol (Other (Mild to Mod))      SOCIAL HISTORY: No toxic habits reported currently    FAMILY HISTORY:  Family history of brain tumor      Vital Signs Last 24 Hrs  T(C): 36.7 (12 Nov 2019 09:35), Max: 37.1 (11 Nov 2019 12:37)  T(F): 98 (12 Nov 2019 09:35), Max: 98.8 (11 Nov 2019 12:37)  HR: 83 (12 Nov 2019 09:35) (83 - 100)  BP: 105/69 (12 Nov 2019 09:35) (100/74 - 119/58)  BP(mean): --  RR: 18 (12 Nov 2019 09:35) (16 - 18)  SpO2: 98% (12 Nov 2019 09:35) (95% - 100%)    PHYSICAL EXAM:  GENERAL: No apparent distress, appears stated age  HEAD:  Atraumatic, Normocephalic  EYES: Conjunctiva and sclera clear, no discharge  ENMT: Moist mucous membranes, no nasal or ear discharge  NECK: Supple, no JVD  CHEST/LUNG: Clear to auscultation; no rales, wheeze or rubs  HEART: Regular rate and rhythm, no rubs or gallops  ABDOMEN: Soft, Nontender, Nondistended; +ostomy, +dressing lower abdomen  EXTREMITIES:  No clubbing, cyanosis or edema, dressing right thigh skin graft site  SKIN: No new rash or discoloration  NERVOUS SYSTEM:  Alert & Oriented; Bilateral lower extremity mobile, sensation to light touch intact      LABS:  WBC Count: 10.48 K/uL (11-12 @ 09:41)  RBC Count: 2.38 M/uL <L> (11-12 @ 09:41)  Hemoglobin: 8.1 g/dL <L> (11-12 @ 09:41)  Hematocrit: 25.2 % <L> (11-12 @ 09:41)  Platelet Count - Automated: 276 K/uL (11-12 @ 09:41)  Magnesium, Serum: 1.7 mg/dL (11-12 @ 07:40)  Phosphorus Level, Serum: 4.3 mg/dL (11-12 @ 07:40)  WBC Count: 10.90 K/uL <H> (11-12 @ 00:26)  RBC Count: 2.43 M/uL <L> (11-12 @ 00:26)  Hemoglobin: 8.1 g/dL <L> (11-12 @ 00:26)  Hematocrit: 25.0 % <L> (11-12 @ 00:26)  Platelet Count - Automated: 275 K/uL (11-12 @ 00:26)      11-12    133<L>  |  101  |  7   ----------------------------<  85  3.9   |  23  |  0.57    Ca    9.0      12 Nov 2019 07:40  Phos  4.3     11-12  Mg     1.7     11-12    TPro  6.5  /  Alb  2.5<L>  /  TBili  < 0.2<L>  /  DBili  x   /  AST  16  /  ALT  9   /  AlkPhos  103  11-11                PT/INR - ( 12 Nov 2019 09:28 )   PT: 13.7 sec;   INR: 1.21 ratio         PTT - ( 12 Nov 2019 09:28 )  PTT:72.1 sec                      IMAGING: imaging reviewed personally    CT abdomen:  IMPRESSION:     No evidence of acute diverticulitis, as clinically questioned.    Increased size left ventral wall subcutaneous collection now measuring   4.5 cm. Please correlate clinically for superimposed infection.    Consultant Notes Reviewed

## 2019-11-12 NOTE — PROGRESS NOTE ADULT - ASSESSMENT
74 year old female with a PMH of asthma, COPD, CHF, afib, PPM, recent admission for perforated diverticulitis, s/p colostomy, hospital course complicated by abdominal abscesses discharged with BASIM drain to rehab now admitted with abdominal pain, ct of a/p showing enlarging fluid collection concerning for abscess.    - NPO   - IVF  - Heparin gtt  - Discuss drainage with IR  - IV abx

## 2019-11-12 NOTE — CONSULT NOTE ADULT - ASSESSMENT
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 WBC on admission of 11.69 with 70% PMN. CT A/P with increased size left ventral wall subcutaneous collection now measuring 4.5 cm. Restarted on Ertapenem.     Patient planned for IR evaluation with possible aspiration/drainage of collection today. I recommend obtaining repeat cultures from the abscess for more data and help with source control.     Would obtain repeat CT A/P with rectal contrast and PO contrast during this admission to see if fistula from rectal stump to abscess is still patent. If rectal stump fistula is still patent low likelihood of resolution of abscess with continued IV antibiotics alone.    Overall, Intraabdominal Abscess, Rectal Stump Fistula, Leukocytosis    --Agree with IR evaluation for further drainage. Would send new cultures if proceeding with drainage  --Recommend CT A/P with PO and Rectal contrast prior to hospital discharge   --Continue Ertapenem 1g IV Q24H  --Continue to follow CBC with diff  --Continue to follow temperature curve  --Follow up on preliminary blood cultures    I will continue to follow. Please feel free to contact me with any further questions.    Rex Goode M.D.  [facility] Division of Infectious Disease  8AM-5PM: Pager Number [phone]  After Hours (or if no response): Please contact the Infectious Diseases Office at [phone]

## 2019-11-12 NOTE — CONSULT NOTE ADULT - PROBLEM SELECTOR RECOMMENDATION 4
unclear EF on last echo  no current evidence of volume overload.  monitor intake and output per shift

## 2019-11-12 NOTE — PATIENT PROFILE ADULT - CENTRAL VENOUS CATHETER/PICC LINE
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

## 2019-11-12 NOTE — PROGRESS NOTE ADULT - ATTENDING COMMENTS
I have seen and evaluated the patient and discussed the relevant clinical findings and plan with the surgical housestaff and fellow.  Agree with the above documentation with addenda as noted.     Patient well known to me, transferred from [facility] for new left-sided subcutaneous fluid collection with increased pain and induration.  Clinically stable  On heparin gtt for AFlutter  On abx  Abdomen is soft, nondistended, TTP around left-sided BASIM drain which is non-erythematous and putting out a small amount of serous fluid.  Lower midline fistula site with purulent discharge  Wound otherwise well-healed.    Plan for IR evaluation for drainage of fluid collection today   Medicine to see    Clark Alvarado MD

## 2019-11-12 NOTE — PATIENT PROFILE ADULT - NSPROSPIRITUALVALUESFT_GEN_A_NUR
(0) no particular expression or smile/(0) lying quietly, normal position, moves easily/(0) normal position or relaxed/(0) no cry (awake or asleep) Confucianist

## 2019-11-13 DIAGNOSIS — E16.2 HYPOGLYCEMIA, UNSPECIFIED: ICD-10-CM

## 2019-11-13 LAB
ANION GAP SERPL CALC-SCNC: 12 MMOL/L — SIGNIFICANT CHANGE UP (ref 5–17)
APTT BLD: 101.8 SEC — HIGH (ref 27.5–36.3)
APTT BLD: 125.5 SEC — CRITICAL HIGH (ref 27.5–36.3)
BACTERIA BLD CULT: SIGNIFICANT CHANGE UP
BACTERIA BLD CULT: SIGNIFICANT CHANGE UP
BUN SERPL-MCNC: 6 MG/DL — LOW (ref 7–23)
CALCIUM SERPL-MCNC: 8.8 MG/DL — SIGNIFICANT CHANGE UP (ref 8.4–10.5)
CHLORIDE SERPL-SCNC: 102 MMOL/L — SIGNIFICANT CHANGE UP (ref 96–108)
CO2 SERPL-SCNC: 21 MMOL/L — LOW (ref 22–31)
CREAT SERPL-MCNC: 0.54 MG/DL — SIGNIFICANT CHANGE UP (ref 0.5–1.3)
GLUCOSE SERPL-MCNC: 78 MG/DL — SIGNIFICANT CHANGE UP (ref 70–99)
GRAM STN FLD: SIGNIFICANT CHANGE UP
HCT VFR BLD CALC: 25.6 % — LOW (ref 34.5–45)
HGB BLD-MCNC: 8.5 G/DL — LOW (ref 11.5–15.5)
MAGNESIUM SERPL-MCNC: 1.5 MG/DL — LOW (ref 1.6–2.6)
MCHC RBC-ENTMCNC: 33.2 GM/DL — SIGNIFICANT CHANGE UP (ref 32–36)
MCHC RBC-ENTMCNC: 34.8 PG — HIGH (ref 27–34)
MCV RBC AUTO: 104.9 FL — HIGH (ref 80–100)
PHOSPHATE SERPL-MCNC: 4.5 MG/DL — SIGNIFICANT CHANGE UP (ref 2.5–4.5)
PLATELET # BLD AUTO: 287 K/UL — SIGNIFICANT CHANGE UP (ref 150–400)
POTASSIUM SERPL-MCNC: 4 MMOL/L — SIGNIFICANT CHANGE UP (ref 3.5–5.3)
POTASSIUM SERPL-SCNC: 4 MMOL/L — SIGNIFICANT CHANGE UP (ref 3.5–5.3)
RBC # BLD: 2.44 M/UL — LOW (ref 3.8–5.2)
RBC # FLD: 17.2 % — HIGH (ref 10.3–14.5)
SODIUM SERPL-SCNC: 135 MMOL/L — SIGNIFICANT CHANGE UP (ref 135–145)
SPECIMEN SOURCE: SIGNIFICANT CHANGE UP
WBC # BLD: 12.57 K/UL — HIGH (ref 3.8–10.5)
WBC # FLD AUTO: 12.57 K/UL — HIGH (ref 3.8–10.5)

## 2019-11-13 PROCEDURE — 74177 CT ABD & PELVIS W/CONTRAST: CPT | Mod: 26

## 2019-11-13 PROCEDURE — 99233 SBSQ HOSP IP/OBS HIGH 50: CPT

## 2019-11-13 PROCEDURE — 71045 X-RAY EXAM CHEST 1 VIEW: CPT | Mod: 26

## 2019-11-13 PROCEDURE — 99232 SBSQ HOSP IP/OBS MODERATE 35: CPT

## 2019-11-13 RX ORDER — APIXABAN 2.5 MG/1
5 TABLET, FILM COATED ORAL EVERY 12 HOURS
Refills: 0 | Status: DISCONTINUED | OUTPATIENT
Start: 2019-11-13 | End: 2019-11-14

## 2019-11-13 RX ORDER — MAGNESIUM SULFATE 500 MG/ML
2 VIAL (ML) INJECTION ONCE
Refills: 0 | Status: COMPLETED | OUTPATIENT
Start: 2019-11-13 | End: 2019-11-13

## 2019-11-13 RX ORDER — DIPHENHYDRAMINE HCL 50 MG
1.25 CAPSULE ORAL ONCE
Refills: 0 | Status: COMPLETED | OUTPATIENT
Start: 2019-11-13 | End: 2019-11-13

## 2019-11-13 RX ORDER — HYDROCORTISONE 20 MG
63 TABLET ORAL ONCE
Refills: 0 | Status: COMPLETED | OUTPATIENT
Start: 2019-11-13 | End: 2019-11-13

## 2019-11-13 RX ORDER — VANCOMYCIN HCL 1 G
750 VIAL (EA) INTRAVENOUS EVERY 12 HOURS
Refills: 0 | Status: DISCONTINUED | OUTPATIENT
Start: 2019-11-13 | End: 2019-11-15

## 2019-11-13 RX ADMIN — CHLORHEXIDINE GLUCONATE 1 APPLICATION(S): 213 SOLUTION TOPICAL at 12:29

## 2019-11-13 RX ADMIN — ERTAPENEM SODIUM 120 MILLIGRAM(S): 1 INJECTION, POWDER, LYOPHILIZED, FOR SOLUTION INTRAMUSCULAR; INTRAVENOUS at 23:51

## 2019-11-13 RX ADMIN — APIXABAN 5 MILLIGRAM(S): 2.5 TABLET, FILM COATED ORAL at 17:41

## 2019-11-13 RX ADMIN — Medication 1 MILLIGRAM(S): at 12:28

## 2019-11-13 RX ADMIN — Medication 50 GRAM(S): at 10:43

## 2019-11-13 RX ADMIN — Medication 63 MILLIGRAM(S): at 18:43

## 2019-11-13 RX ADMIN — FAMOTIDINE 20 MILLIGRAM(S): 10 INJECTION INTRAVENOUS at 12:28

## 2019-11-13 RX ADMIN — Medication 250 MILLIGRAM(S): at 17:39

## 2019-11-13 RX ADMIN — MONTELUKAST 10 MILLIGRAM(S): 4 TABLET, CHEWABLE ORAL at 12:28

## 2019-11-13 RX ADMIN — Medication 500 MILLIGRAM(S): at 12:27

## 2019-11-13 RX ADMIN — LORATADINE 10 MILLIGRAM(S): 10 TABLET ORAL at 12:28

## 2019-11-13 RX ADMIN — Medication 1.25 MILLIGRAM(S): at 21:13

## 2019-11-13 NOTE — PROGRESS NOTE ADULT - ASSESSMENT
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 WBC on admission of 11.69 with 70% PMN. CT A/P with increased size left ventral wall subcutaneous collection now measuring 4.5 cm. Restarted on Ertapenem.     IR drained small collection on 11/12. Gram stain with GPCPC. Suspect likely enterococcus. Would add Vancomycin at this time as it looks to be the predominant organism.     Would obtain repeat CT A/P with rectal contrast and PO contrast during this admission to see if fistula from rectal stump to abscess is still patent. If rectal stump fistula is still patent low likelihood of resolution of abscess with continued IV antibiotics alone.    If still with communication to rectal stump would recommend considering forming second fistula from abdominal wall abscess cavity to the outside so it drains spontaneously.     Overall, Intraabdominal Abscess, Rectal Stump Fistula, Leukocytosis 74 year old female PMH of HFrEF, s/p AICD, asthma, HTN, HLD, atrial fibrillation on eliquis, with previous perforated diverticulosis, Hartmans procedure with multiple RTOR for abd washouts with persistent intraabdominal abscess with fistula to rectal stump who presented to [facility] on 11/8/19 with worsening abdominal pain. WBC on admission of 11.69 with 70% PMN. CT A/P with increased size left ventral wall subcutaneous collection now measuring 4.5 cm. Restarted on Ertapenem.     IR drained small collection on 11/12. Gram stain with GPCPC. Suspect likely enterococcus. Would add Vancomycin at this time as it looks to be the predominant organism.     Would obtain repeat CT A/P with rectal contrast and PO contrast during this admission to see if fistula from rectal stump to abscess is still patent. If rectal stump fistula is still patent low likelihood of resolution of abscess with continued IV antibiotics alone.    If still with communication to rectal stump would recommend considering forming second fistula from abdominal wall abscess cavity to the outside so it drains spontaneously.     Overall, Intraabdominal Abscess, Rectal Stump Fistula, Leukocytosis    --Recommend adding Vancomycin 750 mg IV Q12H. Trough prior to fourth dose.   --Recommend CT A/P with PO and Rectal contrast prior to hospital discharge   --Continue Ertapenem 1g IV Q24H  --Continue to follow CBC with diff  --Continue to follow temperature curve  --Follow up on preliminary blood cultures    I will continue to follow. Please feel free to contact me with any further questions.    Rex Goode M.D.  [facility] Division of Infectious Disease  8AM-5PM: Pager Number [phone]  After Hours (or if no response): Please contact the Infectious Diseases Office at [phone]

## 2019-11-13 NOTE — PROGRESS NOTE ADULT - SUBJECTIVE AND OBJECTIVE BOX
Follow Up:  Intraabdominal Abscess    Interval History: No chills or fevers. Continues to have abdominal pain. No N/V/D     REVIEW OF SYSTEMS  [  ] ROS unobtainable because:    [  x] All other systems negative except as noted below    Constitutional:  [ ] fever [ ] chills  [ ] weight loss  [ ] weakness  Skin:  [ ] rash [ ] phlebitis	  Eyes: [ ] icterus [ ] pain  [ ] discharge	  ENMT: [ ] sore throat  [ ] thrush [ ] ulcers [ ] exudates  Respiratory: [ ] dyspnea [ ] hemoptysis [ ] cough [ ] sputum	  Cardiovascular:  [ ] chest pain [ ] palpitations [ ] edema	  Gastrointestinal:  [ ] nausea [ ] vomiting [ ] diarrhea [ ] constipation [ x ] pain	  Genitourinary:  [ ] dysuria [ ] frequency [ ] hematuria [ ] discharge [ ] flank pain  [ ] incontinence  Musculoskeletal:  [ ] myalgias [ ] arthralgias [ ] arthritis  [ ] back pain  Neurological:  [ ] headache [ ] seizures  [ ] confusion/altered mental status    Allergies  Coreg (Other)  digoxin (Other; Short breath (Mild to Mod))  IV Contrast (Unknown)  penicillins (Hives)        ANTIMICROBIALS:  ertapenem  IVPB 1000 every 24 hours      OTHER MEDS:  MEDICATIONS  (STANDING):  dextrose 40% Gel 15 once PRN  dextrose 50% Injectable 12.5 once  dextrose 50% Injectable 25 once  dextrose 50% Injectable 25 once  famotidine    Tablet 20 daily  glucagon  Injectable 1 once PRN  heparin  Infusion 1000 <Continuous>  insulin lispro (HumaLOG) corrective regimen sliding scale  Before meals and at bedtime  loratadine 10 daily  montelukast 10 daily      Vital Signs Last 24 Hrs  T(C): 36.8 (13 Nov 2019 09:25), Max: 37.1 (13 Nov 2019 05:16)  T(F): 98.2 (13 Nov 2019 09:25), Max: 98.8 (13 Nov 2019 05:16)  HR: 100 (13 Nov 2019 09:25) (78 - 100)  BP: 100/62 (13 Nov 2019 09:25) (100/62 - 110/71)  BP(mean): --  RR: 18 (13 Nov 2019 09:25) (18 - 18)  SpO2: 99% (13 Nov 2019 09:25) (96% - 100%)    PHYSICAL EXAMINATION:  General: Alert and Awake, NAD  HEENT: PERRL, EOMI, No subconjunctival hemorrhages, Oropharynx Clear, MMM  Neck: Supple, No SARAHI  Cardiac: RRR, No M/R/G  Resp: CTAB, No Wh/Rh/Ra  Abdomen: +RLQ ostomy, lower midline abdominal wall defect with purulent drainage, LLQ drain with purulent drainage, NBS, ND, mild diffuse tenderness to palpation worst in LLQ, No HSM, No rigidity or guarding  MSK: trace to 1+ LE edema. No stigmata of IE. No evidence of phlebitis. No evidence of synovitis.  : External urinary catheter  Skin: No rashes or lesions. Skin is warm and dry to the touch.   Neuro: Alert and Awake. CN 2-12 Grossly intact. Moves all four extremities spontaneously.  Psych: Calm, Pleasant, Cooperative                          8.5    12.57 )-----------( 287      ( 13 Nov 2019 10:14 )             25.6       11-13    135  |  102  |  6<L>  ----------------------------<  78  4.0   |  21<L>  |  0.54    Ca    8.8      13 Nov 2019 05:26  Phos  4.5     11-13  Mg     1.5     11-13            MICROBIOLOGY:  v  .Body Fluid Abdominal Fluid  11-12-19 --  --    Numerous polymorphonuclear leukocytes seen per low power field  Numerous Gram Positive Cocci in Pairs and Chains seen per oil power field      URINE MIDSTREAM  11-08-19 --  --  --      BLOOD PERIPHERAL  11-08-19 --  --  --                RADIOLOGY:    No new imaging for review at time of assessment.

## 2019-11-13 NOTE — PROGRESS NOTE ADULT - ATTENDING COMMENTS
Robert Dee MD, MHA, FACP, [facility]  Pager: [phone]  If no response or off-hours, page [phone]

## 2019-11-13 NOTE — PROGRESS NOTE ADULT - ATTENDING COMMENTS
I have evaluated the relevant clinical findings and plan with the surgical housestaff and/or fellow.  Agree with the above documentation with addenda as noted.         Clark Alvarado MD

## 2019-11-13 NOTE — PROGRESS NOTE ADULT - ASSESSMENT
74 year old female with a PMH of asthma, COPD, CHF, afib, PPM, recent admission for perforated diverticulitis, s/p colostomy, hospital course complicated by abdominal abscesses discharged with BASIM drain to rehab now admitted with abdominal pain, ct of a/p showing enlarging fluid collection concerning for abscess.    Plan:  - Follow up IR drainage  - NPO   - IVF  - Heparin gtt  - IV abx    [facility]  [phone]

## 2019-11-13 NOTE — PROGRESS NOTE ADULT - SUBJECTIVE AND OBJECTIVE BOX
Patient is a 74y old  Female who presents with a chief complaint of Abscess (13 Nov 2019 01:14)      INTERVAL History of Present Illness/OVERNIGHT EVENTS: s/p IR drainage of abdominal abscess 7 cc pus drained.  no reported new complaints    MEDICATIONS  (STANDING):  ascorbic acid 500 milliGRAM(s) Oral daily  chlorhexidine 2% Cloths 1 Application(s) Topical <User Schedule>  dextrose 5%. 1000 milliLiter(s) (50 mL/Hr) IV Continuous <Continuous>  dextrose 50% Injectable 12.5 Gram(s) IV Push once  dextrose 50% Injectable 25 Gram(s) IV Push once  dextrose 50% Injectable 25 Gram(s) IV Push once  ertapenem  IVPB 1000 milliGRAM(s) IV Intermittent every 24 hours  famotidine    Tablet 20 milliGRAM(s) Oral daily  folic acid 1 milliGRAM(s) Oral daily  heparin  Infusion 1000 Unit(s)/Hr (10 mL/Hr) IV Continuous <Continuous>  insulin lispro (HumaLOG) corrective regimen sliding scale   SubCutaneous Before meals and at bedtime  loratadine 10 milliGRAM(s) Oral daily  montelukast 10 milliGRAM(s) Oral daily    MEDICATIONS  (PRN):  dextrose 40% Gel 15 Gram(s) Oral once PRN Blood Glucose LESS THAN 70 milliGRAM(s)/deciLiter  glucagon  Injectable 1 milliGRAM(s) IntraMuscular once PRN Glucose <70 milliGRAM(s)/deciLiter  sodium chloride 0.9% lock flush 10 milliLiter(s) IV Push every 1 hour PRN Pre/post blood products, medications, blood draw, and to maintain line patency      Allergies    Coreg (Other)  digoxin (Other; Short breath (Mild to Mod))  IV Contrast (Unknown)  penicillins (Hives)    Intolerances    metoprolol (Other)  Solu-Medrol (Other (Mild to Mod))      REVIEW OF SYSTEMS:  Negative unless otherwise specified above.    Vital Signs Last 24 Hrs  T(C): 36.8 (13 Nov 2019 09:25), Max: 37.1 (13 Nov 2019 05:16)  T(F): 98.2 (13 Nov 2019 09:25), Max: 98.8 (13 Nov 2019 05:16)  HR: 100 (13 Nov 2019 09:25) (78 - 100)  BP: 100/62 (13 Nov 2019 09:25) (100/62 - 110/71)  BP(mean): --  RR: 18 (13 Nov 2019 09:25) (18 - 18)  SpO2: 99% (13 Nov 2019 09:25) (96% - 100%)        PHYSICAL EXAM:  GENERAL: No apparent distress, appears stated age  HEAD:  Atraumatic, Normocephalic  EYES: Conjunctiva and sclera clear, no discharge  ENMT: Moist mucous membranes, no nasal or ear discharge  NECK: Supple, no JVD  CHEST/LUNG: Clear to auscultation; no rales, wheeze or rubs  HEART: Regular rate and rhythm, no rubs or gallops  ABDOMEN: Soft, Nontender, Nondistended; +ostomy, +dressing lower abdomen  EXTREMITIES:  No clubbing, cyanosis or edema, dressing right thigh skin graft site  SKIN: No new rash or discoloration  NERVOUS SYSTEM:  Alert & Oriented; Bilateral lower extremity mobile, sensation to light touch intact      LABS:                        8.5    12.57 )-----------( 287      ( 13 Nov 2019 10:14 )             25.6     13 Nov 2019 05:26    135    |  102    |  6      ----------------------------<  78     4.0     |  21     |  0.54     Ca    8.8        13 Nov 2019 05:26  Phos  4.5       13 Nov 2019 05:26  Mg     1.5       13 Nov 2019 05:26      PT/INR - ( 12 Nov 2019 09:28 )   PT: 13.7 sec;   INR: 1.21 ratio         PTT - ( 13 Nov 2019 05:26 )  PTT:101.8 sec    CAPILLARY BLOOD GLUCOSE      POCT Blood Glucose.: 79 mg/dL (13 Nov 2019 09:16)  POCT Blood Glucose.: 106 mg/dL (12 Nov 2019 22:01)  POCT Blood Glucose.: 79 mg/dL (12 Nov 2019 18:59)  POCT Blood Glucose.: 80 mg/dL (12 Nov 2019 12:17)      RADIOLOGY & ADDITIONAL TESTS:    Images reviewed personally    Consultant Notes Reviewed and Care Discussed with relevant Consultants/Other Providers.

## 2019-11-13 NOTE — PROGRESS NOTE ADULT - SUBJECTIVE AND OBJECTIVE BOX
Morning Surgical Progress Note  Patient is a 74y old  Female who presents with a chief complaint of Abscess (12 Nov 2019 11:42)      SUBJECTIVE: Patient seen and examined at bedside with surgical team, patient without complaints. There were no acute events overnight.    Vital Signs Last 24 Hrs  T(C): 36.7 (12 Nov 2019 22:12), Max: 36.8 (12 Nov 2019 06:26)  T(F): 98 (12 Nov 2019 22:12), Max: 98.3 (12 Nov 2019 06:26)  HR: 80 (12 Nov 2019 22:12) (78 - 90)  BP: 101/66 (12 Nov 2019 22:12) (101/66 - 110/71)  BP(mean): --  RR: 18 (12 Nov 2019 22:12) (18 - 18)  SpO2: 100% (12 Nov 2019 22:12) (95% - 100%)I&O's Detail    11 Nov 2019 07:01  -  12 Nov 2019 07:00  --------------------------------------------------------  IN:  Total IN: 0 mL    OUT:    Voided: 200 mL  Total OUT: 200 mL    Total NET: -200 mL      12 Nov 2019 07:01  -  13 Nov 2019 01:14  --------------------------------------------------------  IN:    Oral Fluid: 260 mL  Total IN: 260 mL    OUT:    Voided: 450 mL  Total OUT: 450 mL    Total NET: -190 mL      MEDICATIONS  (STANDING):  ascorbic acid 500 milliGRAM(s) Oral daily  chlorhexidine 2% Cloths 1 Application(s) Topical <User Schedule>  dextrose 5%. 1000 milliLiter(s) (50 mL/Hr) IV Continuous <Continuous>  dextrose 50% Injectable 12.5 Gram(s) IV Push once  dextrose 50% Injectable 25 Gram(s) IV Push once  dextrose 50% Injectable 25 Gram(s) IV Push once  ertapenem  IVPB 1000 milliGRAM(s) IV Intermittent every 24 hours  famotidine    Tablet 20 milliGRAM(s) Oral daily  folic acid 1 milliGRAM(s) Oral daily  heparin  Infusion 1000 Unit(s)/Hr (10 mL/Hr) IV Continuous <Continuous>  insulin lispro (HumaLOG) corrective regimen sliding scale   SubCutaneous Before meals and at bedtime  loratadine 10 milliGRAM(s) Oral daily  montelukast 10 milliGRAM(s) Oral daily    MEDICATIONS  (PRN):  dextrose 40% Gel 15 Gram(s) Oral once PRN Blood Glucose LESS THAN 70 milliGRAM(s)/deciLiter  glucagon  Injectable 1 milliGRAM(s) IntraMuscular once PRN Glucose <70 milliGRAM(s)/deciLiter  sodium chloride 0.9% lock flush 10 milliLiter(s) IV Push every 1 hour PRN Pre/post blood products, medications, blood draw, and to maintain line patency    Physical Exam:  HEAD:  Atraumatic, Normocephalic  ENT: EOMI, PERRLA, No JVD, moist mucosa  CHEST/LUNG: Clear to auscultation bilaterally  HEART: Regular rate and rhythm; No murmurs, rubs, or gallops  ABDOMEN: Soft, LLQ tenderness near BASIM drain, fluctuance around site, , lower abdomen wound dressing with purulent drainage, Nondistended  EXTREMITIES:  No clubbing, cyanosis, or edema  PSYCH: Nl behavior, nl affect  NEUROLOGY: AAOx3, non-focal, cranial nerves intact  SKIN: Normal color, No rashes or lesions    LABS:                        8.3    13.39 )-----------( 280      ( 12 Nov 2019 23:17 )             25.0     11-12    133<L>  |  101  |  7   ----------------------------<  85  3.9   |  23  |  0.57    Ca    9.0      12 Nov 2019 07:40  Phos  4.3     11-12  Mg     1.7     11-12    TPro  6.5  /  Alb  2.5<L>  /  TBili  < 0.2<L>  /  DBili  x   /  AST  16  /  ALT  9   /  AlkPhos  103  11-11    PT/INR - ( 12 Nov 2019 09:28 )   PT: 13.7 sec;   INR: 1.21 ratio         PTT - ( 12 Nov 2019 23:17 )  PTT:88.3 sec  LIVER FUNCTIONS - ( 11 Nov 2019 06:46 )  Alb: 2.5 g/dL / Pro: 6.5 g/dL / ALK PHOS: 103 u/L / ALT: 9 u/L / AST: 16 u/L / GGT: x

## 2019-11-13 NOTE — CHART NOTE - NSCHARTNOTEFT_GEN_A_CORE
Vascular & Interventional Radiology Post-Procedure Note    Pre-Procedure Diagnosis: Abd wall collection  Post-Procedure Diagnosis: Same as pre.  Indications for Procedure: abscess    : Santino  Assistant(s): phyllis    Procedure Details/Findings: US guided aspiration of a tiny collection in the L hemoabd wall, 7cc pus aspirated  Access (if applicable): n/a    Complications: none  Estimated Blood Loss: Minimal  Specimen: sent for culture  Contrast: none  Sedation: n/a  Patient Condition/Disposition: stable, back to floor    Plan: f/u culture

## 2019-11-14 LAB
ANION GAP SERPL CALC-SCNC: 11 MMOL/L — SIGNIFICANT CHANGE UP (ref 5–17)
APTT BLD: 37.5 SEC — HIGH (ref 27.5–36.3)
BUN SERPL-MCNC: 8 MG/DL — SIGNIFICANT CHANGE UP (ref 7–23)
CALCIUM SERPL-MCNC: 9.2 MG/DL — SIGNIFICANT CHANGE UP (ref 8.4–10.5)
CHLORIDE SERPL-SCNC: 100 MMOL/L — SIGNIFICANT CHANGE UP (ref 96–108)
CO2 SERPL-SCNC: 22 MMOL/L — SIGNIFICANT CHANGE UP (ref 22–31)
CREAT SERPL-MCNC: 0.63 MG/DL — SIGNIFICANT CHANGE UP (ref 0.5–1.3)
GLUCOSE SERPL-MCNC: 128 MG/DL — HIGH (ref 70–99)
HCT VFR BLD CALC: 25.2 % — LOW (ref 34.5–45)
HCT VFR BLD CALC: 26.4 % — LOW (ref 34.5–45)
HGB BLD-MCNC: 8.6 G/DL — LOW (ref 11.5–15.5)
HGB BLD-MCNC: 9 G/DL — LOW (ref 11.5–15.5)
INR BLD: 1.56 RATIO — HIGH (ref 0.88–1.16)
MAGNESIUM SERPL-MCNC: 1.9 MG/DL — SIGNIFICANT CHANGE UP (ref 1.6–2.6)
MCHC RBC-ENTMCNC: 34.1 GM/DL — SIGNIFICANT CHANGE UP (ref 32–36)
MCHC RBC-ENTMCNC: 34.1 GM/DL — SIGNIFICANT CHANGE UP (ref 32–36)
MCHC RBC-ENTMCNC: 34.3 PG — HIGH (ref 27–34)
MCHC RBC-ENTMCNC: 35 PG — HIGH (ref 27–34)
MCV RBC AUTO: 100.4 FL — HIGH (ref 80–100)
MCV RBC AUTO: 102.7 FL — HIGH (ref 80–100)
NRBC # BLD: 0 /100 WBCS — SIGNIFICANT CHANGE UP (ref 0–0)
PHOSPHATE SERPL-MCNC: 4.1 MG/DL — SIGNIFICANT CHANGE UP (ref 2.5–4.5)
PLATELET # BLD AUTO: 282 K/UL — SIGNIFICANT CHANGE UP (ref 150–400)
PLATELET # BLD AUTO: 327 K/UL — SIGNIFICANT CHANGE UP (ref 150–400)
POTASSIUM SERPL-MCNC: 4 MMOL/L — SIGNIFICANT CHANGE UP (ref 3.5–5.3)
POTASSIUM SERPL-SCNC: 4 MMOL/L — SIGNIFICANT CHANGE UP (ref 3.5–5.3)
PROTHROM AB SERPL-ACNC: 18.2 SEC — HIGH (ref 10–12.9)
RBC # BLD: 2.51 M/UL — LOW (ref 3.8–5.2)
RBC # BLD: 2.57 M/UL — LOW (ref 3.8–5.2)
RBC # FLD: 16.7 % — HIGH (ref 10.3–14.5)
RBC # FLD: 16.7 % — HIGH (ref 10.3–14.5)
SODIUM SERPL-SCNC: 133 MMOL/L — LOW (ref 135–145)
WBC # BLD: 11.71 K/UL — HIGH (ref 3.8–10.5)
WBC # BLD: 13.26 K/UL — HIGH (ref 3.8–10.5)
WBC # FLD AUTO: 11.71 K/UL — HIGH (ref 3.8–10.5)
WBC # FLD AUTO: 13.26 K/UL — HIGH (ref 3.8–10.5)

## 2019-11-14 PROCEDURE — 99232 SBSQ HOSP IP/OBS MODERATE 35: CPT | Mod: GC

## 2019-11-14 PROCEDURE — 99232 SBSQ HOSP IP/OBS MODERATE 35: CPT

## 2019-11-14 RX ORDER — INSULIN LISPRO 100/ML
VIAL (ML) SUBCUTANEOUS
Refills: 0 | Status: DISCONTINUED | OUTPATIENT
Start: 2019-11-14 | End: 2019-11-18

## 2019-11-14 RX ORDER — INSULIN LISPRO 100/ML
VIAL (ML) SUBCUTANEOUS AT BEDTIME
Refills: 0 | Status: DISCONTINUED | OUTPATIENT
Start: 2019-11-14 | End: 2019-11-18

## 2019-11-14 RX ORDER — HEPARIN SODIUM 5000 [USP'U]/ML
1100 INJECTION INTRAVENOUS; SUBCUTANEOUS
Qty: 25000 | Refills: 0 | Status: DISCONTINUED | OUTPATIENT
Start: 2019-11-14 | End: 2019-11-15

## 2019-11-14 RX ADMIN — LORATADINE 10 MILLIGRAM(S): 10 TABLET ORAL at 11:30

## 2019-11-14 RX ADMIN — Medication 500 MILLIGRAM(S): at 11:29

## 2019-11-14 RX ADMIN — Medication 1 MILLIGRAM(S): at 11:30

## 2019-11-14 RX ADMIN — HEPARIN SODIUM 11 UNIT(S)/HR: 5000 INJECTION INTRAVENOUS; SUBCUTANEOUS at 16:46

## 2019-11-14 RX ADMIN — CHLORHEXIDINE GLUCONATE 1 APPLICATION(S): 213 SOLUTION TOPICAL at 09:46

## 2019-11-14 RX ADMIN — APIXABAN 5 MILLIGRAM(S): 2.5 TABLET, FILM COATED ORAL at 06:35

## 2019-11-14 RX ADMIN — Medication 250 MILLIGRAM(S): at 06:35

## 2019-11-14 RX ADMIN — Medication 250 MILLIGRAM(S): at 17:00

## 2019-11-14 RX ADMIN — FAMOTIDINE 20 MILLIGRAM(S): 10 INJECTION INTRAVENOUS at 11:30

## 2019-11-14 RX ADMIN — ERTAPENEM SODIUM 120 MILLIGRAM(S): 1 INJECTION, POWDER, LYOPHILIZED, FOR SOLUTION INTRAMUSCULAR; INTRAVENOUS at 23:56

## 2019-11-14 RX ADMIN — MONTELUKAST 10 MILLIGRAM(S): 4 TABLET, CHEWABLE ORAL at 13:49

## 2019-11-14 NOTE — PROGRESS NOTE ADULT - ASSESSMENT
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 WBC on admission of 11.69 with 70% PMN. CT A/P with increased size left ventral wall subcutaneous collection now measuring 4.5 cm. Restarted on Ertapenem.     IR drained small collection on 11/12. Prelim abscess cx with E coli.         Overall, worsening Intraabdominal Abscess, Rectal Stump Fistula, Leukocytosis, positive cx finding,     Plan:   --c/w Vancomycin 750 mg IV Q12H. Trough prior to fourth dose.   --CT abd/pelvis with iv contrast, no rectal contrast seems to given.   --Continue Ertapenem 1g IV Q24H for now, pt already s/p prolonged course of abx and the collection has increased in size, at this point needs source control.   --Continue to follow CBC with diff  --Follow up on preliminary blood cultures, NTD  --follow up abscess cx.

## 2019-11-14 NOTE — PROGRESS NOTE ADULT - ATTENDING COMMENTS
I have seen and evaluated the patient and discussed the relevant clinical findings and plan with the surgical housestaff and fellow.  Agree with the above documentation with addenda as noted.     Clinically stable, pain improved.  Abdomen soft.  Small Purulent drainage from L BASIM.  Purulent drainage from inferior pole midline fistula.  Wound is well healed and clean.    Cont abx  Plan for repeat CT with rectal contrast    Clark Alvarado MD I have seen and evaluated the patient and discussed the relevant clinical findings and plan with the surgical housestaff and fellow.  Agree with the above documentation with addenda as noted.     Clinically stable, pain improved.  Abdomen soft.  Small Purulent drainage from L BASIM.  Purulent drainage from inferior pole midline fistula.  Wound is well healed and clean.    CT was performed last night without rectal contrast.  Shows persistent and larger left subcutaneous fluid collection, as well as lower midline fluid collection at site of draining fistula.    Cont abx  Will consult IR to drain fluid collections    Clark Alvarado MD

## 2019-11-14 NOTE — PROGRESS NOTE ADULT - ASSESSMENT
74 year old female with a PMH of asthma, COPD, CHF, afib, PPM, recent admission for perforated diverticulitis, s/p colostomy, hospital course complicated by abdominal abscesses discharged with BASIM drain to rehab now admitted with abdominal pain, ct of a/p showing enlarging fluid collection concerning for abscess. Now s/p IR drainage of abscess.     Plan:  - Regular diet    - f/u CT with rectal contrast   - Heparin gtt  - IV abx  - Appreciate medicine recommendations     [facility]  [phone] 74 year old female with a PMH of asthma, COPD, CHF, afib, PPM, recent admission for perforated diverticulitis, s/p colostomy, hospital course complicated by abdominal abscesses discharged with BASIM drain to rehab now admitted with abdominal pain, ct of a/p showing enlarging fluid collection concerning for abscess. Now s/p IR drainage of abscess.     Plan:  - Regular diet    -f/u CT with rectal contrast   - cont eliquis  -IV abx per ID  -f/u labs  -OOB and ambulate  - Appreciate medicine recommendations     Green [facility]  [phone]

## 2019-11-14 NOTE — PROGRESS NOTE ADULT - SUBJECTIVE AND OBJECTIVE BOX
74y old  Female who presents with a chief complaint of Abscess (14 Nov 2019 00:12)      Interval history:  Afebrile, pain on manipulation of the drain, some cough, appetite ok.       Allergies:  Coreg (Other)  digoxin (Other; Short breath (Mild to Mod))  IV Contrast (Unknown)  metoprolol (Other)  penicillins (Hives)  Solu-Medrol (Other (Mild to Mod))      Antimicrobials:  ertapenem  IVPB 1000 milliGRAM(s) IV Intermittent every 24 hours  vancomycin  IVPB 750 milliGRAM(s) IV Intermittent every 12 hours      REVIEW OF SYSTEMS:  No chest pain   No SOB  No N/V  No rash.       Vital Signs Last 24 Hrs  T(C): 36.8 (11-14-19 @ 14:50), Max: 37.4 (11-13-19 @ 17:15)  T(F): 98.2 (11-14-19 @ 14:50), Max: 99.4 (11-13-19 @ 17:15)  HR: 72 (11-14-19 @ 14:50) (72 - 105)  BP: 137/81 (11-14-19 @ 14:50) (101/57 - 137/81)  BP(mean): --  RR: 18 (11-14-19 @ 14:50) (18 - 18)  SpO2: 96% (11-14-19 @ 14:50) (94% - 98%)      PHYSICAL EXAM:  Patient in no acute distress. Alert, awake.   Cardiovascular: S1S2 normal, + ICD  Lungs: + air entry B/L lung fields.  Gastrointestinal: soft, + ostomy, lt LQ drain, midline wound with dressing.   Extremities: + edema.  Rt PICC                            9.0    11.71 )-----------( 327      ( 14 Nov 2019 08:37 )             26.4   11-14    133<L>  |  100  |  8   ----------------------------<  128<H>  4.0   |  22  |  0.63    Ca    9.2      14 Nov 2019 06:46  Phos  4.1     11-14  Mg     1.9     11-14        Culture - Body Fluid with Gram Stain (collected 12 Nov 2019 22:09)  Source: .Body Fluid Abdominal Fluid  Gram Stain (13 Nov 2019 00:27):    Numerous polymorphonuclear leukocytes seen per low power field    Numerous Gram Positive Cocci in Pairs and Chains seen per oil power field  Preliminary Report (13 Nov 2019 20:14):    Moderate Escherichia coli      Radiology:  < from: CT Abdomen and Pelvis w/ IV Cont (11.13.19 @ 22:50) >  IMPRESSION:     Diverticulosis without diverticulitis. Status post left hemicolectomy.    Persistent left ventral abdominal wall subcutaneous collection that   measures up to 6.0 cm.    Enlarging left anterior abdominal wall fluid collection and new midline   inferior subcutaneous collection measuring up to 5.9 cm.

## 2019-11-14 NOTE — PROGRESS NOTE ADULT - SUBJECTIVE AND OBJECTIVE BOX
Morning Surgical Progress Note  Patient is a 74y old  Female who presents with a chief complaint of Abscess (12 Nov 2019 11:42)      SUBJECTIVE: Patient seen and examined at bedside with surgical team, patient without complaints. There were no acute events overnight.      MEDICATIONS  (STANDING):  apixaban 5 milliGRAM(s) Oral every 12 hours  ascorbic acid 500 milliGRAM(s) Oral daily  chlorhexidine 2% Cloths 1 Application(s) Topical <User Schedule>  dextrose 5%. 1000 milliLiter(s) (50 mL/Hr) IV Continuous <Continuous>  dextrose 50% Injectable 12.5 Gram(s) IV Push once  dextrose 50% Injectable 25 Gram(s) IV Push once  dextrose 50% Injectable 25 Gram(s) IV Push once  ertapenem  IVPB 1000 milliGRAM(s) IV Intermittent every 24 hours  famotidine    Tablet 20 milliGRAM(s) Oral daily  folic acid 1 milliGRAM(s) Oral daily  loratadine 10 milliGRAM(s) Oral daily  montelukast 10 milliGRAM(s) Oral daily  vancomycin  IVPB 750 milliGRAM(s) IV Intermittent every 12 hours    MEDICATIONS  (PRN):  dextrose 40% Gel 15 Gram(s) Oral once PRN Blood Glucose LESS THAN 70 milliGRAM(s)/deciLiter  glucagon  Injectable 1 milliGRAM(s) IntraMuscular once PRN Glucose <70 milliGRAM(s)/deciLiter  sodium chloride 0.9% lock flush 10 milliLiter(s) IV Push every 1 hour PRN Pre/post blood products, medications, blood draw, and to maintain line patency      Vital Signs Last 24 Hrs  T(C): 37.1 (13 Nov 2019 22:00), Max: 37.4 (13 Nov 2019 17:15)  T(F): 98.7 (13 Nov 2019 22:00), Max: 99.4 (13 Nov 2019 17:15)  HR: 101 (13 Nov 2019 22:00) (95 - 105)  BP: 107/68 (13 Nov 2019 22:00) (99/66 - 108/61)  BP(mean): --  RR: 18 (13 Nov 2019 22:00) (18 - 19)  SpO2: 97% (13 Nov 2019 22:00) (96% - 99%)        I&O's Detail    12 Nov 2019 07:01  -  13 Nov 2019 07:00  --------------------------------------------------------  IN:    heparin Infusion: 222 mL    Oral Fluid: 260 mL    Solution: 250 mL  Total IN: 732 mL    OUT:    Voided: 1300 mL  Total OUT: 1300 mL    Total NET: -568 mL      13 Nov 2019 07:01  -  14 Nov 2019 00:14  --------------------------------------------------------  IN:    Oral Fluid: 680 mL    Solution: 250 mL  Total IN: 930 mL    OUT:    Colostomy: 25 mL    Voided: 500 mL  Total OUT: 525 mL    Total NET: 405 mL          Daily     Daily     LABS:                        8.5    12.57 )-----------( 287      ( 13 Nov 2019 10:14 )             25.6     11-13    135  |  102  |  6<L>  ----------------------------<  78  4.0   |  21<L>  |  0.54    Ca    8.8      13 Nov 2019 05:26  Phos  4.5     11-13  Mg     1.5     11-13      PT/INR - ( 12 Nov 2019 09:28 )   PT: 13.7 sec;   INR: 1.21 ratio         PTT - ( 13 Nov 2019 12:19 )  PTT:125.5 sec        Physical Exam:  HEAD:  Atraumatic, Normocephalic  ENT: EOMI, PERRLA, No JVD, moist mucosa  CHEST/LUNG: Clear to auscultation bilaterally  HEART: Regular rate and rhythm; No murmurs, rubs, or gallops  ABDOMEN: Soft, LLQ tenderness near BASIM drain, fluctuance around site, , lower abdomen wound dressing in place, Nondistended  EXTREMITIES:  No clubbing, cyanosis, or edema  PSYCH: Nl behavior, nl affect  NEUROLOGY: AAOx3, non-focal, cranial nerves intact  SKIN: Normal color, No rashes or lesions [facility] Progress Note    SUBJECTIVE: Patient seen and examined at bedside with surgical team, patient c/o LLQ pain over drain site. There were no acute events overnight. Denies nausea, vomiting.       MEDICATIONS  (STANDING):  apixaban 5 milliGRAM(s) Oral every 12 hours  ascorbic acid 500 milliGRAM(s) Oral daily  chlorhexidine 2% Cloths 1 Application(s) Topical <User Schedule>  dextrose 5%. 1000 milliLiter(s) (50 mL/Hr) IV Continuous <Continuous>  dextrose 50% Injectable 12.5 Gram(s) IV Push once  dextrose 50% Injectable 25 Gram(s) IV Push once  dextrose 50% Injectable 25 Gram(s) IV Push once  ertapenem  IVPB 1000 milliGRAM(s) IV Intermittent every 24 hours  famotidine    Tablet 20 milliGRAM(s) Oral daily  folic acid 1 milliGRAM(s) Oral daily  loratadine 10 milliGRAM(s) Oral daily  montelukast 10 milliGRAM(s) Oral daily  vancomycin  IVPB 750 milliGRAM(s) IV Intermittent every 12 hours    MEDICATIONS  (PRN):  dextrose 40% Gel 15 Gram(s) Oral once PRN Blood Glucose LESS THAN 70 milliGRAM(s)/deciLiter  glucagon  Injectable 1 milliGRAM(s) IntraMuscular once PRN Glucose <70 milliGRAM(s)/deciLiter  sodium chloride 0.9% lock flush 10 milliLiter(s) IV Push every 1 hour PRN Pre/post blood products, medications, blood draw, and to maintain line patency      Vital Signs Last 24 Hrs  T(C): 37.1 (13 Nov 2019 22:00), Max: 37.4 (13 Nov 2019 17:15)  T(F): 98.7 (13 Nov 2019 22:00), Max: 99.4 (13 Nov 2019 17:15)  HR: 101 (13 Nov 2019 22:00) (95 - 105)  BP: 107/68 (13 Nov 2019 22:00) (99/66 - 108/61)  BP(mean): --  RR: 18 (13 Nov 2019 22:00) (18 - 19)  SpO2: 97% (13 Nov 2019 22:00) (96% - 99%)        I&O's Detail    12 Nov 2019 07:01  -  13 Nov 2019 07:00  --------------------------------------------------------  IN:    heparin Infusion: 222 mL    Oral Fluid: 260 mL    Solution: 250 mL  Total IN: 732 mL    OUT:    Voided: 1300 mL  Total OUT: 1300 mL    Total NET: -568 mL      13 Nov 2019 07:01  -  14 Nov 2019 00:14  --------------------------------------------------------  IN:    Oral Fluid: 680 mL    Solution: 250 mL  Total IN: 930 mL    OUT:    Colostomy: 25 mL    Voided: 500 mL  Total OUT: 525 mL    Total NET: 405 mL      Physical Exam    Gen: NAD, A+Ox3  Resp: non labored  Abd: soft, ND, LLQ TTP. Drain in place w/purulent drainage.      LABS:                        8.5    12.57 )-----------( 287      ( 13 Nov 2019 10:14 )             25.6     11-13    135  |  102  |  6<L>  ----------------------------<  78  4.0   |  21<L>  |  0.54    Ca    8.8      13 Nov 2019 05:26  Phos  4.5     11-13  Mg     1.5     11-13      PT/INR - ( 12 Nov 2019 09:28 )   PT: 13.7 sec;   INR: 1.21 ratio         PTT - ( 13 Nov 2019 12:19 )  PTT:125.5 sec

## 2019-11-14 NOTE — CHART NOTE - NSCHARTNOTEFT_GEN_A_CORE
Pt's dressing was changed and Aquacel and ABD was reapplied covered with tape. Her wound has +purulent discharge. There was no surrounding erythema, active bleeding or hematoma. Patients wound site is now clean, dry and intact.       [facility]  [phone]

## 2019-11-15 LAB
-  AMIKACIN: SIGNIFICANT CHANGE UP
-  AMOXICILLIN/CLAVULANIC ACID: SIGNIFICANT CHANGE UP
-  AMPICILLIN/SULBACTAM: SIGNIFICANT CHANGE UP
-  AMPICILLIN: SIGNIFICANT CHANGE UP
-  AZTREONAM: SIGNIFICANT CHANGE UP
-  CEFAZOLIN: SIGNIFICANT CHANGE UP
-  CEFEPIME: SIGNIFICANT CHANGE UP
-  CEFOXITIN: SIGNIFICANT CHANGE UP
-  CEFTRIAXONE: SIGNIFICANT CHANGE UP
-  CIPROFLOXACIN: SIGNIFICANT CHANGE UP
-  ERTAPENEM: SIGNIFICANT CHANGE UP
-  GENTAMICIN: SIGNIFICANT CHANGE UP
-  IMIPENEM: SIGNIFICANT CHANGE UP
-  LEVOFLOXACIN: SIGNIFICANT CHANGE UP
-  MEROPENEM: SIGNIFICANT CHANGE UP
-  PIPERACILLIN/TAZOBACTAM: SIGNIFICANT CHANGE UP
-  TOBRAMYCIN: SIGNIFICANT CHANGE UP
-  TRIMETHOPRIM/SULFAMETHOXAZOLE: SIGNIFICANT CHANGE UP
ANION GAP SERPL CALC-SCNC: 12 MMOL/L — SIGNIFICANT CHANGE UP (ref 5–17)
APTT BLD: 107.5 SEC — HIGH (ref 27.5–36.3)
APTT BLD: 122.5 SEC — CRITICAL HIGH (ref 27.5–36.3)
APTT BLD: 88.1 SEC — HIGH (ref 27.5–36.3)
APTT BLD: 98.4 SEC — HIGH (ref 27.5–36.3)
BUN SERPL-MCNC: 10 MG/DL — SIGNIFICANT CHANGE UP (ref 7–23)
CALCIUM SERPL-MCNC: 8.5 MG/DL — SIGNIFICANT CHANGE UP (ref 8.4–10.5)
CHLORIDE SERPL-SCNC: 102 MMOL/L — SIGNIFICANT CHANGE UP (ref 96–108)
CO2 SERPL-SCNC: 24 MMOL/L — SIGNIFICANT CHANGE UP (ref 22–31)
CREAT SERPL-MCNC: 0.65 MG/DL — SIGNIFICANT CHANGE UP (ref 0.5–1.3)
CULTURE RESULTS: SIGNIFICANT CHANGE UP
GLUCOSE SERPL-MCNC: 100 MG/DL — HIGH (ref 70–99)
HCT VFR BLD CALC: 24.5 % — LOW (ref 34.5–45)
HGB BLD-MCNC: 8.2 G/DL — LOW (ref 11.5–15.5)
MAGNESIUM SERPL-MCNC: 1.6 MG/DL — SIGNIFICANT CHANGE UP (ref 1.6–2.6)
MCHC RBC-ENTMCNC: 33.5 GM/DL — SIGNIFICANT CHANGE UP (ref 32–36)
MCHC RBC-ENTMCNC: 35 PG — HIGH (ref 27–34)
MCV RBC AUTO: 104.7 FL — HIGH (ref 80–100)
METHOD TYPE: SIGNIFICANT CHANGE UP
ORGANISM # SPEC MICROSCOPIC CNT: SIGNIFICANT CHANGE UP
ORGANISM # SPEC MICROSCOPIC CNT: SIGNIFICANT CHANGE UP
PHOSPHATE SERPL-MCNC: 3.1 MG/DL — SIGNIFICANT CHANGE UP (ref 2.5–4.5)
PLATELET # BLD AUTO: 309 K/UL — SIGNIFICANT CHANGE UP (ref 150–400)
POTASSIUM SERPL-MCNC: 3.7 MMOL/L — SIGNIFICANT CHANGE UP (ref 3.5–5.3)
POTASSIUM SERPL-SCNC: 3.7 MMOL/L — SIGNIFICANT CHANGE UP (ref 3.5–5.3)
RBC # BLD: 2.34 M/UL — LOW (ref 3.8–5.2)
RBC # FLD: 17.1 % — HIGH (ref 10.3–14.5)
SODIUM SERPL-SCNC: 138 MMOL/L — SIGNIFICANT CHANGE UP (ref 135–145)
SPECIMEN SOURCE: SIGNIFICANT CHANGE UP
VANCOMYCIN TROUGH SERPL-MCNC: 17.3 UG/ML — SIGNIFICANT CHANGE UP (ref 10–20)
WBC # BLD: 12.11 K/UL — HIGH (ref 3.8–10.5)
WBC # FLD AUTO: 12.11 K/UL — HIGH (ref 3.8–10.5)

## 2019-11-15 PROCEDURE — 99233 SBSQ HOSP IP/OBS HIGH 50: CPT

## 2019-11-15 PROCEDURE — 99232 SBSQ HOSP IP/OBS MODERATE 35: CPT | Mod: GC

## 2019-11-15 RX ORDER — POTASSIUM CHLORIDE 20 MEQ
20 PACKET (EA) ORAL ONCE
Refills: 0 | Status: COMPLETED | OUTPATIENT
Start: 2019-11-15 | End: 2019-11-15

## 2019-11-15 RX ORDER — MAGNESIUM SULFATE 500 MG/ML
2 VIAL (ML) INJECTION ONCE
Refills: 0 | Status: COMPLETED | OUTPATIENT
Start: 2019-11-15 | End: 2019-11-15

## 2019-11-15 RX ORDER — LIDOCAINE HCL 20 MG/ML
5 VIAL (ML) INJECTION ONCE
Refills: 0 | Status: COMPLETED | OUTPATIENT
Start: 2019-11-15 | End: 2019-11-15

## 2019-11-15 RX ORDER — HEPARIN SODIUM 5000 [USP'U]/ML
9 INJECTION INTRAVENOUS; SUBCUTANEOUS
Qty: 25000 | Refills: 0 | Status: DISCONTINUED | OUTPATIENT
Start: 2019-11-15 | End: 2019-11-15

## 2019-11-15 RX ORDER — HEPARIN SODIUM 5000 [USP'U]/ML
900 INJECTION INTRAVENOUS; SUBCUTANEOUS
Qty: 25000 | Refills: 0 | Status: DISCONTINUED | OUTPATIENT
Start: 2019-11-15 | End: 2019-11-17

## 2019-11-15 RX ADMIN — Medication 5 MILLILITER(S): at 22:24

## 2019-11-15 RX ADMIN — Medication 50 GRAM(S): at 10:35

## 2019-11-15 RX ADMIN — HEPARIN SODIUM 10 UNIT(S)/HR: 5000 INJECTION INTRAVENOUS; SUBCUTANEOUS at 07:29

## 2019-11-15 RX ADMIN — Medication 500 MILLIGRAM(S): at 13:19

## 2019-11-15 RX ADMIN — HEPARIN SODIUM 9 UNIT(S)/HR: 5000 INJECTION INTRAVENOUS; SUBCUTANEOUS at 21:32

## 2019-11-15 RX ADMIN — ERTAPENEM SODIUM 120 MILLIGRAM(S): 1 INJECTION, POWDER, LYOPHILIZED, FOR SOLUTION INTRAMUSCULAR; INTRAVENOUS at 23:33

## 2019-11-15 RX ADMIN — HEPARIN SODIUM 10 UNIT(S)/HR: 5000 INJECTION INTRAVENOUS; SUBCUTANEOUS at 00:42

## 2019-11-15 RX ADMIN — LORATADINE 10 MILLIGRAM(S): 10 TABLET ORAL at 13:19

## 2019-11-15 RX ADMIN — FAMOTIDINE 20 MILLIGRAM(S): 10 INJECTION INTRAVENOUS at 13:19

## 2019-11-15 RX ADMIN — Medication 1 MILLIGRAM(S): at 13:19

## 2019-11-15 RX ADMIN — MONTELUKAST 10 MILLIGRAM(S): 4 TABLET, CHEWABLE ORAL at 13:19

## 2019-11-15 RX ADMIN — CHLORHEXIDINE GLUCONATE 1 APPLICATION(S): 213 SOLUTION TOPICAL at 13:20

## 2019-11-15 RX ADMIN — HEPARIN SODIUM 9 UNIT(S)/HR: 5000 INJECTION INTRAVENOUS; SUBCUTANEOUS at 15:10

## 2019-11-15 RX ADMIN — Medication 20 MILLIEQUIVALENT(S): at 13:19

## 2019-11-15 RX ADMIN — Medication 250 MILLIGRAM(S): at 08:20

## 2019-11-15 NOTE — PHYSICAL THERAPY INITIAL EVALUATION ADULT - GENERAL OBSERVATIONS, REHAB EVAL
Pt rec'ed supine in bed, NAD +IV, +dressing to midline abdomen wound saturated w/ drainage, +primafit external female catheter, nolvia PT WC Eval w/o adverse reaction.

## 2019-11-15 NOTE — PHYSICAL THERAPY INITIAL EVALUATION ADULT - ADDITIONAL COMMENTS
CT scan showing increase in size of L paracolic gutter fluid collection, however drains appear in place    Prior to admit to [facility], patient reports residing in a private home with daughter and son-in-law with 5-6 steps to enter +HR assist. Patient states that she owns her own RW and single-axis cane and she would ambulate with AD occasionally as needed. She reports being functionally independent and performing ADLs independently at home.

## 2019-11-15 NOTE — PROVIDER CONTACT NOTE (OTHER) - SITUATION
Patient left pigtail to BASIM suture not connected to patient's skin. Does MD Alvarado want this drain flushed? If so, how much and how often?

## 2019-11-15 NOTE — PROVIDER CONTACT NOTE (MEDICATION) - SITUATION
Patient on Heparin drip @ 10mL, patient specific, post q6h PTT draw resulted supratherapeutic according to patient specific range indicated on order: 107.5

## 2019-11-15 NOTE — PROGRESS NOTE ADULT - ASSESSMENT
74 year old female with a PMH of asthma, COPD, CHF, afib, PPM, recent admission for perforated diverticulitis, s/p colostomy, hospital course complicated by abdominal abscesses discharged with BASIM drain to rehab now admitted with abdominal pain, ct of a/p showing enlarging fluid collection concerning for abscess. Now s/p IR drainage of abscess.     Plan:  - f/u abscess cultures  - Monitor Hep drip therapeutic range  - Regular diet    - cont eliquis  - IV abx per ID  - f/u labs  - OOB and ambulate  - Appreciate medicine recommendations     [facility]  [phone]

## 2019-11-15 NOTE — PROGRESS NOTE ADULT - ATTENDING COMMENTS
I have seen and evaluated the patient and discussed the relevant clinical findings and plan with the surgical housestaff and fellow.  Agree with the above documentation with addenda as noted.     Feels better, still with some pain at left abdomen.  Afebrile  Abdomen is soft  Drain with small purulent fluid    WBC trending up    Spoke to IR yesterday, they will not do percutaneous drainage of fluid collections unless patient is off eliquis x 48 hours, and they will not do drainage over weekend.  Patient is on the schedule for Monday.    Clark Alvarado MD

## 2019-11-15 NOTE — PROGRESS NOTE ADULT - ASSESSMENT
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 WBC on admission of 11.69 with 70% PMN. CT A/P with increased size left ventral wall subcutaneous collection now measuring 4.5 cm. Restarted on Ertapenem.     IR drained small collection on 11/12. Prelim abscess cx with E coli.         Overall, worsening Intraabdominal Abscess, Rectal Stump Fistula, Leukocytosis, positive cx finding,     Plan:   --stopped vanco, no enterococcus in cx.   --CT abd/pelvis with iv contrast, reviewed with radiology, the old collection has increased in size and there is a new collection, they seem to communicate with each other.   --Continue Ertapenem 1g IV Q24H for now,  --plan for IR guided drainage of the collection next week, once the drain is placed will do 7 days of abx and then stop.   --discussed with Surgery Attending, there is high likelihood of persistent leak, but pt not a surgical candidate at this point, will attempt source control with drainage catheter.   --Continue to follow CBC with diff  --Follow up on preliminary blood cultures, NTD      Discussed the plan with daughter over the phone, spent 50 mins in communication, coordination of care.

## 2019-11-15 NOTE — PROGRESS NOTE ADULT - SUBJECTIVE AND OBJECTIVE BOX
[facility] Progress Note    SUBJECTIVE: Patient seen and examined at bedside with surgical team. There were no acute events overnight. Denies nausea, vomiting.     OBJECTIVE:  Gen: NAD, A+Ox3  Resp: non labored  Abd: soft, ND, LLQ TTP. Drain in place w/purulent drainage.      V/S:  Vital Signs Last 24 Hrs  T(C): 36.8 (15 Nov 2019 00:35), Max: 36.9 (14 Nov 2019 17:04)  T(F): 98.3 (15 Nov 2019 00:35), Max: 98.5 (14 Nov 2019 17:04)  HR: 102 (15 Nov 2019 00:35) (72 - 102)  BP: 106/63 (15 Nov 2019 00:35) (106/63 - 137/81)  BP(mean): --  RR: 18 (15 Nov 2019 00:35) (18 - 18)  SpO2: 96% (15 Nov 2019 00:35) (94% - 97%)    --------------------------------------------------------------------------------------------------  I/Os:    13 Nov 2019 07:01  -  14 Nov 2019 07:00  --------------------------------------------------------  IN:    Oral Fluid: 680 mL    Solution: 50 mL    Solution: 500 mL  Total IN: 1230 mL    OUT:    Colostomy: 25 mL    Voided: 500 mL  Total OUT: 525 mL    Total NET: 705 mL      14 Nov 2019 07:01  -  15 Nov 2019 04:18  --------------------------------------------------------  IN:    heparin Infusion: 25 mL    Oral Fluid: 480 mL    Solution: 250 mL  Total IN: 755 mL    OUT:    Bulb: 10 mL    Voided: 400 mL  Total OUT: 410 mL    Total NET: 345 mL        --------------------------------------------------------------------------------------------------  LABS:                        8.6    13.26 )-----------( 282      ( 14 Nov 2019 23:52 )             25.2     14 Nov 2019 06:46    133    |  100    |  8      ----------------------------<  128    4.0     |  22     |  0.63     Ca    9.2        14 Nov 2019 06:46  Phos  4.1       14 Nov 2019 06:46  Mg     1.9       14 Nov 2019 06:46      PT/INR - ( 14 Nov 2019 13:20 )   PT: 18.2 sec;   INR: 1.56 ratio         PTT - ( 14 Nov 2019 23:52 )  PTT:122.5 sec  CAPILLARY BLOOD GLUCOSE      POCT Blood Glucose.: 123 mg/dL (14 Nov 2019 22:16)  POCT Blood Glucose.: 113 mg/dL (14 Nov 2019 10:11)  POCT Blood Glucose.: 154 mg/dL (14 Nov 2019 06:30)            Culture - Body Fluid with Gram Stain (collected 12 Nov 2019 22:09)  Source: .Body Fluid Abdominal Fluid  Gram Stain (13 Nov 2019 00:27):    Numerous polymorphonuclear leukocytes seen per low power field    Numerous Gram Positive Cocci in Pairs and Chains seen per oil power field  Final Report (15 Nov 2019 00:20):    Moderate Escherichia coli    Moderate Bacteroides thetaiotamcron group "Susceptibilities not performed"    Few Alpha hemolytic strep "Susceptibilities not performed"  Organism: Escherichia coli (15 Nov 2019 00:20)  Organism: Escherichia coli (15 Nov 2019 00:20)        --------------------------------------------------------------------------------------------------  MEDICATIONS  (STANDING):  ascorbic acid 500 milliGRAM(s) Oral daily  chlorhexidine 2% Cloths 1 Application(s) Topical <User Schedule>  dextrose 5%. 1000 milliLiter(s) (50 mL/Hr) IV Continuous <Continuous>  dextrose 50% Injectable 12.5 Gram(s) IV Push once  dextrose 50% Injectable 25 Gram(s) IV Push once  dextrose 50% Injectable 25 Gram(s) IV Push once  ertapenem  IVPB 1000 milliGRAM(s) IV Intermittent every 24 hours  famotidine    Tablet 20 milliGRAM(s) Oral daily  folic acid 1 milliGRAM(s) Oral daily  heparin  Infusion 1100 Unit(s)/Hr (10 mL/Hr) IV Continuous <Continuous>  insulin lispro (HumaLOG) corrective regimen sliding scale   SubCutaneous three times a day before meals  insulin lispro (HumaLOG) corrective regimen sliding scale   SubCutaneous at bedtime  loratadine 10 milliGRAM(s) Oral daily  montelukast 10 milliGRAM(s) Oral daily  vancomycin  IVPB 750 milliGRAM(s) IV Intermittent every 12 hours    MEDICATIONS  (PRN):  dextrose 40% Gel 15 Gram(s) Oral once PRN Blood Glucose LESS THAN 70 milliGRAM(s)/deciLiter  glucagon  Injectable 1 milliGRAM(s) IntraMuscular once PRN Glucose <70 milliGRAM(s)/deciLiter  sodium chloride 0.9% lock flush 10 milliLiter(s) IV Push every 1 hour PRN Pre/post blood products, medications, blood draw, and to maintain line patency [facility] Progress Note    SUBJECTIVE: Patient seen and examined at bedside with surgical team. There were no acute events overnight. Denies nausea, vomiting. pain controlled    OBJECTIVE:  Gen: NAD, A+Ox3  Resp: non labored  Abd: soft, ND, LLQ TTP. Drain in place w/purulent drainage.  ostomy with gas and stool    V/S:  Vital Signs Last 24 Hrs  T(C): 36.8 (15 Nov 2019 00:35), Max: 36.9 (14 Nov 2019 17:04)  T(F): 98.3 (15 Nov 2019 00:35), Max: 98.5 (14 Nov 2019 17:04)  HR: 102 (15 Nov 2019 00:35) (72 - 102)  BP: 106/63 (15 Nov 2019 00:35) (106/63 - 137/81)  BP(mean): --  RR: 18 (15 Nov 2019 00:35) (18 - 18)  SpO2: 96% (15 Nov 2019 00:35) (94% - 97%)    --------------------------------------------------------------------------------------------------  I/Os:    13 Nov 2019 07:01  -  14 Nov 2019 07:00  --------------------------------------------------------  IN:    Oral Fluid: 680 mL    Solution: 50 mL    Solution: 500 mL  Total IN: 1230 mL    OUT:    Colostomy: 25 mL    Voided: 500 mL  Total OUT: 525 mL    Total NET: 705 mL      14 Nov 2019 07:01  -  15 Nov 2019 04:18  --------------------------------------------------------  IN:    heparin Infusion: 25 mL    Oral Fluid: 480 mL    Solution: 250 mL  Total IN: 755 mL    OUT:    Bulb: 10 mL    Voided: 400 mL  Total OUT: 410 mL    Total NET: 345 mL        --------------------------------------------------------------------------------------------------  LABS:                        8.6    13.26 )-----------( 282      ( 14 Nov 2019 23:52 )             25.2     14 Nov 2019 06:46    133    |  100    |  8      ----------------------------<  128    4.0     |  22     |  0.63     Ca    9.2        14 Nov 2019 06:46  Phos  4.1       14 Nov 2019 06:46  Mg     1.9       14 Nov 2019 06:46      PT/INR - ( 14 Nov 2019 13:20 )   PT: 18.2 sec;   INR: 1.56 ratio         PTT - ( 14 Nov 2019 23:52 )  PTT:122.5 sec  CAPILLARY BLOOD GLUCOSE      POCT Blood Glucose.: 123 mg/dL (14 Nov 2019 22:16)  POCT Blood Glucose.: 113 mg/dL (14 Nov 2019 10:11)  POCT Blood Glucose.: 154 mg/dL (14 Nov 2019 06:30)            Culture - Body Fluid with Gram Stain (collected 12 Nov 2019 22:09)  Source: .Body Fluid Abdominal Fluid  Gram Stain (13 Nov 2019 00:27):    Numerous polymorphonuclear leukocytes seen per low power field    Numerous Gram Positive Cocci in Pairs and Chains seen per oil power field  Final Report (15 Nov 2019 00:20):    Moderate Escherichia coli    Moderate Bacteroides thetaiotamcron group "Susceptibilities not performed"    Few Alpha hemolytic strep "Susceptibilities not performed"  Organism: Escherichia coli (15 Nov 2019 00:20)  Organism: Escherichia coli (15 Nov 2019 00:20)        --------------------------------------------------------------------------------------------------  MEDICATIONS  (STANDING):  ascorbic acid 500 milliGRAM(s) Oral daily  chlorhexidine 2% Cloths 1 Application(s) Topical <User Schedule>  dextrose 5%. 1000 milliLiter(s) (50 mL/Hr) IV Continuous <Continuous>  dextrose 50% Injectable 12.5 Gram(s) IV Push once  dextrose 50% Injectable 25 Gram(s) IV Push once  dextrose 50% Injectable 25 Gram(s) IV Push once  ertapenem  IVPB 1000 milliGRAM(s) IV Intermittent every 24 hours  famotidine    Tablet 20 milliGRAM(s) Oral daily  folic acid 1 milliGRAM(s) Oral daily  heparin  Infusion 1100 Unit(s)/Hr (10 mL/Hr) IV Continuous <Continuous>  insulin lispro (HumaLOG) corrective regimen sliding scale   SubCutaneous three times a day before meals  insulin lispro (HumaLOG) corrective regimen sliding scale   SubCutaneous at bedtime  loratadine 10 milliGRAM(s) Oral daily  montelukast 10 milliGRAM(s) Oral daily  vancomycin  IVPB 750 milliGRAM(s) IV Intermittent every 12 hours    MEDICATIONS  (PRN):  dextrose 40% Gel 15 Gram(s) Oral once PRN Blood Glucose LESS THAN 70 milliGRAM(s)/deciLiter  glucagon  Injectable 1 milliGRAM(s) IntraMuscular once PRN Glucose <70 milliGRAM(s)/deciLiter  sodium chloride 0.9% lock flush 10 milliLiter(s) IV Push every 1 hour PRN Pre/post blood products, medications, blood draw, and to maintain line patency

## 2019-11-15 NOTE — PHYSICAL THERAPY INITIAL EVALUATION ADULT - PERTINENT HX OF CURRENT PROBLEM, REHAB EVAL
74yoF PMH of asthma, COPD, CHF, afib, PPM, recent admission for perforated diverticulitis of the transverse colon 7/2019 which required an ex-lap for feculent peritonitis & an end transverse colostomy, hospital course complicated resulted in an open abdomen that was eventually closed on 7/11/19 w/ strattice mesh. Pt had further complication w/ abdominal abscesses discharged w/ BASIM drain to rehab now coming in w/ increased LLQ pain & pain at the BASIM drain site.

## 2019-11-15 NOTE — PROGRESS NOTE ADULT - SUBJECTIVE AND OBJECTIVE BOX
74y old  Female who presents with a chief complaint of Abscess (15 Nov 2019 04:17)      Interval history:  Afebrile, eating, still some abdominal pain.       Allergies:   Coreg (Other)  digoxin (Other; Short breath (Mild to Mod))  IV Contrast (Unknown)  metoprolol (Other)  penicillins (Hives)  Solu-Medrol (Other (Mild to Mod))      Antimicrobials:  ertapenem  IVPB 1000 milliGRAM(s) IV Intermittent every 24 hours      REVIEW OF SYSTEMS:  No chest pain   No SOB  No N/V  No rash.       Vital Signs Last 24 Hrs  T(C): 36.5 (11-15-19 @ 13:13), Max: 37.1 (11-15-19 @ 06:31)  T(F): 97.7 (11-15-19 @ 13:13), Max: 98.7 (11-15-19 @ 06:31)  HR: 101 (11-15-19 @ 13:13) (98 - 105)  BP: 101/66 (11-15-19 @ 13:13) (93/62 - 115/71)  BP(mean): --  RR: 18 (11-15-19 @ 13:13) (18 - 19)  SpO2: 94% (11-15-19 @ 13:13) (94% - 97%)      PHYSICAL EXAM:  Patient in no acute distress. Alert, awake.   Cardiovascular: S1S2 normal, + ICD, tachy   Lungs: + air entry B/L lung fields.  Gastrointestinal: soft, + ostomy, lt LQ drain, midline wound with purulent drainage with dressing.   Extremities: + edema.  Rt PICC                              8.2    12.11 )-----------( 309      ( 15 Nov 2019 08:35 )             24.5   11-15    138  |  102  |  10  ----------------------------<  100<H>  3.7   |  24  |  0.65    Ca    8.5      15 Nov 2019 05:47  Phos  3.1     11-15  Mg     1.6     11-15      Culture - Body Fluid with Gram Stain (collected 12 Nov 2019 22:09)  Source: .Body Fluid Abdominal Fluid  Gram Stain (13 Nov 2019 00:27):    Numerous polymorphonuclear leukocytes seen per low power field    Numerous Gram Positive Cocci in Pairs and Chains seen per oil power field  Final Report (15 Nov 2019 00:20):    Moderate Escherichia coli    Moderate Bacteroides thetaiotamcron group "Susceptibilities not performed"    Few Alpha hemolytic strep "Susceptibilities not performed"  Organism: Escherichia coli (15 Nov 2019 00:20)  Organism: Escherichia coli (15 Nov 2019 00:20)      Radiology:  < from: CT Abdomen and Pelvis w/ IV Cont (11.13.19 @ 22:50) >  IMPRESSION:     Diverticulosis without diverticulitis. Status post left hemicolectomy.    Persistent left ventral abdominal wall subcutaneous collection that   measures up to 6.0 cm.    Enlarging left anterior abdominal wall fluid collection and new midline   inferior subcutaneous collection measuring up to 5.9 cm.

## 2019-11-15 NOTE — PHYSICAL THERAPY INITIAL EVALUATION ADULT - PHYSICAL ASSIST/NONPHYSICAL ASSIST: GAIT, REHAB EVAL
Pt reports that she has been having increased depression and random thoughts of suicide, \"but I would never, never, never, act on them, I am just depressed.\"  Pt not sure if she needs admission or a prescription for a medication.  
verbal cues/1 person assist/nonverbal cues (demo/gestures)

## 2019-11-15 NOTE — PROGRESS NOTE ADULT - ATTENDING COMMENTS
[facility] [place]  Pager: [phone]. If no response or past 5 pm call [phone].       Please call ID service for questions over weekend at [phone].

## 2019-11-15 NOTE — PHYSICAL THERAPY INITIAL EVALUATION ADULT - MODALITIES TREATMENT COMMENTS
Pt rec'ed supine in bed, NAD +IV, +dressing to midline abdomen wound saturated w/ drainage, +primafit external female catheter, nolvia PT WC Eval w/o adverse reaction. Distal midline abdomen wound rec'ed soiled w/ drainage. No odor, no erythema. Noted creamy, tan drainage expressed from wound when milked from left abdomen medially towards the wound. Wound measuring 4cm x 2.2cm, 100% granular. Wound cleansed w/ NS, cavilon to periwound, aquacel to base, ABD pad to cover, papertape to secure. Called & spoke w/ [id number] - PT WC to sign off & team to follow daily at this time. Pt declined functional assessment/OOB<>chair, wishes to rest. Pt left supine in bed, NAD, all lines & tubes intact, 5Ps, +call bell

## 2019-11-15 NOTE — PHYSICAL THERAPY INITIAL EVALUATION ADULT - ACTIVE RANGE OF MOTION EXAMINATION, REHAB EVAL
Left UE Active ROM was WFL (within functional limits)/Left LE Active ROM was WFL (within functional limits)/Right LE Active ROM was WFL (within functional limits)/Bilateral Hip ROM not assessed secondary to pain and discomfort/Right UE Active ROM was WFL (within functional limits)

## 2019-11-16 LAB
ANION GAP SERPL CALC-SCNC: 14 MMOL/L — SIGNIFICANT CHANGE UP (ref 5–17)
APTT BLD: 93.3 SEC — HIGH (ref 27.5–36.3)
BUN SERPL-MCNC: 7 MG/DL — SIGNIFICANT CHANGE UP (ref 7–23)
CALCIUM SERPL-MCNC: 9 MG/DL — SIGNIFICANT CHANGE UP (ref 8.4–10.5)
CHLORIDE SERPL-SCNC: 102 MMOL/L — SIGNIFICANT CHANGE UP (ref 96–108)
CO2 SERPL-SCNC: 24 MMOL/L — SIGNIFICANT CHANGE UP (ref 22–31)
CREAT SERPL-MCNC: 0.51 MG/DL — SIGNIFICANT CHANGE UP (ref 0.5–1.3)
GLUCOSE SERPL-MCNC: 91 MG/DL — SIGNIFICANT CHANGE UP (ref 70–99)
HCT VFR BLD CALC: 25.4 % — LOW (ref 34.5–45)
HGB BLD-MCNC: 8.4 G/DL — LOW (ref 11.5–15.5)
MAGNESIUM SERPL-MCNC: 1.8 MG/DL — SIGNIFICANT CHANGE UP (ref 1.6–2.6)
MCHC RBC-ENTMCNC: 33.1 GM/DL — SIGNIFICANT CHANGE UP (ref 32–36)
MCHC RBC-ENTMCNC: 35.3 PG — HIGH (ref 27–34)
MCV RBC AUTO: 106.7 FL — HIGH (ref 80–100)
PHOSPHATE SERPL-MCNC: 3.1 MG/DL — SIGNIFICANT CHANGE UP (ref 2.5–4.5)
PLATELET # BLD AUTO: 312 K/UL — SIGNIFICANT CHANGE UP (ref 150–400)
POTASSIUM SERPL-MCNC: 4.1 MMOL/L — SIGNIFICANT CHANGE UP (ref 3.5–5.3)
POTASSIUM SERPL-SCNC: 4.1 MMOL/L — SIGNIFICANT CHANGE UP (ref 3.5–5.3)
RBC # BLD: 2.38 M/UL — LOW (ref 3.8–5.2)
RBC # FLD: 16.9 % — HIGH (ref 10.3–14.5)
SODIUM SERPL-SCNC: 140 MMOL/L — SIGNIFICANT CHANGE UP (ref 135–145)
WBC # BLD: 10.18 K/UL — SIGNIFICANT CHANGE UP (ref 3.8–10.5)
WBC # FLD AUTO: 10.18 K/UL — SIGNIFICANT CHANGE UP (ref 3.8–10.5)

## 2019-11-16 RX ORDER — MAGNESIUM SULFATE 500 MG/ML
2 VIAL (ML) INJECTION ONCE
Refills: 0 | Status: COMPLETED | OUTPATIENT
Start: 2019-11-16 | End: 2019-11-16

## 2019-11-16 RX ADMIN — Medication 50 GRAM(S): at 10:08

## 2019-11-16 RX ADMIN — Medication 500 MILLIGRAM(S): at 12:37

## 2019-11-16 RX ADMIN — Medication 1 MILLIGRAM(S): at 12:37

## 2019-11-16 RX ADMIN — HEPARIN SODIUM 9 UNIT(S)/HR: 5000 INJECTION INTRAVENOUS; SUBCUTANEOUS at 04:58

## 2019-11-16 RX ADMIN — FAMOTIDINE 20 MILLIGRAM(S): 10 INJECTION INTRAVENOUS at 12:37

## 2019-11-16 RX ADMIN — LORATADINE 10 MILLIGRAM(S): 10 TABLET ORAL at 12:37

## 2019-11-16 RX ADMIN — CHLORHEXIDINE GLUCONATE 1 APPLICATION(S): 213 SOLUTION TOPICAL at 10:08

## 2019-11-16 RX ADMIN — MONTELUKAST 10 MILLIGRAM(S): 4 TABLET, CHEWABLE ORAL at 12:37

## 2019-11-16 NOTE — PROVIDER CONTACT NOTE (MEDICATION) - SITUATION
pt aptt level therapeutic 93.3 . 2nd consecutive therapeutic. patient on heparin drip running 9ml/hr

## 2019-11-16 NOTE — PROGRESS NOTE ADULT - SUBJECTIVE AND OBJECTIVE BOX
[facility] Progress Note  Patient is a 74y old  Female who presents with a chief complaint of Abscess (15 Nov 2019 14:07)      SUBJECTIVE: Patient seen and examined at bedside with surgical team. Patient and daughter asked about need for repeating labs due to patient being anemic and a [employer]. Suture for drain site was not secure, so a new suture was inserted to stabilize drain.    Vital Signs Last 24 Hrs  T(C): 37.1 (16 Nov 2019 01:17), Max: 37.1 (15 Nov 2019 06:31)  T(F): 98.8 (16 Nov 2019 01:17), Max: 98.8 (16 Nov 2019 01:17)  HR: 110 (16 Nov 2019 01:17) (99 - 110)  BP: 111/72 (16 Nov 2019 01:17) (93/62 - 114/74)  BP(mean): --  RR: 18 (16 Nov 2019 01:17) (18 - 19)  SpO2: 97% (16 Nov 2019 01:17) (94% - 98%)I&O's Detail    14 Nov 2019 07:01  -  15 Nov 2019 07:00  --------------------------------------------------------  IN:    heparin Infusion: 140 mL    Oral Fluid: 480 mL    Solution: 250 mL    Solution: 50 mL  Total IN: 920 mL    OUT:    Bulb: 10 mL    Voided: 800 mL  Total OUT: 810 mL    Total NET: 110 mL      15 Nov 2019 07:01  -  16 Nov 2019 02:07  --------------------------------------------------------  IN:    heparin Infusion: 46 mL    heparin Infusion: 80 mL    Oral Fluid: 760 mL    Solution: 50 mL    Solution: 250 mL  Total IN: 1186 mL    OUT:    Colostomy: 1 mL    Voided: 550 mL  Total OUT: 551 mL    Total NET: 635 mL      MEDICATIONS  (STANDING):  ascorbic acid 500 milliGRAM(s) Oral daily  chlorhexidine 2% Cloths 1 Application(s) Topical <User Schedule>  dextrose 5%. 1000 milliLiter(s) (50 mL/Hr) IV Continuous <Continuous>  dextrose 50% Injectable 12.5 Gram(s) IV Push once  dextrose 50% Injectable 25 Gram(s) IV Push once  dextrose 50% Injectable 25 Gram(s) IV Push once  ertapenem  IVPB 1000 milliGRAM(s) IV Intermittent every 24 hours  famotidine    Tablet 20 milliGRAM(s) Oral daily  folic acid 1 milliGRAM(s) Oral daily  heparin  Infusion 900 Unit(s)/Hr (9 mL/Hr) IV Continuous <Continuous>  insulin lispro (HumaLOG) corrective regimen sliding scale   SubCutaneous three times a day before meals  insulin lispro (HumaLOG) corrective regimen sliding scale   SubCutaneous at bedtime  loratadine 10 milliGRAM(s) Oral daily  montelukast 10 milliGRAM(s) Oral daily    MEDICATIONS  (PRN):  dextrose 40% Gel 15 Gram(s) Oral once PRN Blood Glucose LESS THAN 70 milliGRAM(s)/deciLiter  glucagon  Injectable 1 milliGRAM(s) IntraMuscular once PRN Glucose <70 milliGRAM(s)/deciLiter  sodium chloride 0.9% lock flush 10 milliLiter(s) IV Push every 1 hour PRN Pre/post blood products, medications, blood draw, and to maintain line patency      Physical Exam  Constitutional: A&Ox3, NAD  Respiratory: CTA bilaterally  Cardiac: S1 and S2  Gastrointestinal: abdomen soft, NT/ND, skin around drain site is slightly eroded; drain producing serous output; dressings c/d/i    LABS:                        8.2    12.11 )-----------( 309      ( 15 Nov 2019 08:35 )             24.5     11-15    138  |  102  |  10  ----------------------------<  100<H>  3.7   |  24  |  0.65    Ca    8.5      15 Nov 2019 05:47  Phos  3.1     11-15  Mg     1.6     11-15      PT/INR - ( 14 Nov 2019 13:20 )   PT: 18.2 sec;   INR: 1.56 ratio         PTT - ( 15 Nov 2019 20:54 )  PTT:88.1 sec Morning Surgical Progress Note  Patient is a 74y old  Female who presents with a chief complaint of Abscess (15 Nov 2019 14:07)      SUBJECTIVE: Patient seen and examined at bedside with surgical team. Patient and daughter asked about need for repeating labs due to patient being anemic and a Religion. Suture for drain site was not secure, so a new suture was inserted to stabilize drain. No issues overnight. Denies CP/SOB, N/V    Vital Signs Last 24 Hrs  T(C): 37.1 (16 Nov 2019 01:17), Max: 37.1 (15 Nov 2019 06:31)  T(F): 98.8 (16 Nov 2019 01:17), Max: 98.8 (16 Nov 2019 01:17)  HR: 110 (16 Nov 2019 01:17) (99 - 110)  BP: 111/72 (16 Nov 2019 01:17) (93/62 - 114/74)  BP(mean): --  RR: 18 (16 Nov 2019 01:17) (18 - 19)  SpO2: 97% (16 Nov 2019 01:17) (94% - 98%)I&O's Detail    14 Nov 2019 07:01  -  15 Nov 2019 07:00  --------------------------------------------------------  IN:    heparin Infusion: 140 mL    Oral Fluid: 480 mL    Solution: 250 mL    Solution: 50 mL  Total IN: 920 mL    OUT:    Bulb: 10 mL    Voided: 800 mL  Total OUT: 810 mL    Total NET: 110 mL      15 Nov 2019 07:01  -  16 Nov 2019 02:07  --------------------------------------------------------  IN:    heparin Infusion: 46 mL    heparin Infusion: 80 mL    Oral Fluid: 760 mL    Solution: 50 mL    Solution: 250 mL  Total IN: 1186 mL    OUT:    Colostomy: 1 mL    Voided: 550 mL  Total OUT: 551 mL    Total NET: 635 mL      MEDICATIONS  (STANDING):  ascorbic acid 500 milliGRAM(s) Oral daily  chlorhexidine 2% Cloths 1 Application(s) Topical <User Schedule>  dextrose 5%. 1000 milliLiter(s) (50 mL/Hr) IV Continuous <Continuous>  dextrose 50% Injectable 12.5 Gram(s) IV Push once  dextrose 50% Injectable 25 Gram(s) IV Push once  dextrose 50% Injectable 25 Gram(s) IV Push once  ertapenem  IVPB 1000 milliGRAM(s) IV Intermittent every 24 hours  famotidine    Tablet 20 milliGRAM(s) Oral daily  folic acid 1 milliGRAM(s) Oral daily  heparin  Infusion 900 Unit(s)/Hr (9 mL/Hr) IV Continuous <Continuous>  insulin lispro (HumaLOG) corrective regimen sliding scale   SubCutaneous three times a day before meals  insulin lispro (HumaLOG) corrective regimen sliding scale   SubCutaneous at bedtime  loratadine 10 milliGRAM(s) Oral daily  montelukast 10 milliGRAM(s) Oral daily    MEDICATIONS  (PRN):  dextrose 40% Gel 15 Gram(s) Oral once PRN Blood Glucose LESS THAN 70 milliGRAM(s)/deciLiter  glucagon  Injectable 1 milliGRAM(s) IntraMuscular once PRN Glucose <70 milliGRAM(s)/deciLiter  sodium chloride 0.9% lock flush 10 milliLiter(s) IV Push every 1 hour PRN Pre/post blood products, medications, blood draw, and to maintain line patency      Physical Exam  Constitutional: A&Ox3, NAD  Respiratory: CTA bilaterally  Cardiac: S1 and S2  Gastrointestinal: abdomen soft, NT/ND, skin around drain site is slightly eroded; drain producing serous output; dressings c/d/i    LABS:                        8.2    12.11 )-----------( 309      ( 15 Nov 2019 08:35 )             24.5     11-15    138  |  102  |  10  ----------------------------<  100<H>  3.7   |  24  |  0.65    Ca    8.5      15 Nov 2019 05:47  Phos  3.1     11-15  Mg     1.6     11-15      PT/INR - ( 14 Nov 2019 13:20 )   PT: 18.2 sec;   INR: 1.56 ratio         PTT - ( 15 Nov 2019 20:54 )  PTT:88.1 sec

## 2019-11-16 NOTE — PROGRESS NOTE ADULT - ASSESSMENT
74 year old female with a PMH of asthma, COPD, CHF, afib, PPM, recent admission for perforated diverticulitis, s/p colostomy, hospital course complicated by abdominal abscesses discharged with BASIM drain to rehab now admitted with abdominal pain, ct of a/p showing enlarging fluid collection concerning for abscess. Now s/p IR drainage of abscess. New suture was placed to stabilize drain overnight.    Plan:   IR drainage on Monday  Continue heparin gtt until IR drainage    [facility]  [id number] 74 year old female with a PMH of asthma, COPD, CHF, rate controlled afib, PPM, recent admission for perforated diverticulitis, s/p colostomy, hospital course complicated by abdominal abscesses discharged with BASIM drain to rehab now admitted with abdominal pain, ct of a/p showing enlarging fluid collection concerning for abscess. Now s/p IR drainage of abscess. New suture was placed to stabilize drain overnight.    Plan:   IR drainage on Monday  Continue heparin gtt until IR drainage  Restart Eliquis after IR procedure    [facility]  [id number] 74 year old female with a PMH of asthma, COPD, CHF, rate controlled afib, PPM, recent admission for perforated diverticulitis, s/p colostomy, hospital course complicated by abdominal abscesses discharged with BASIM drain to rehab now admitted with abdominal pain, ct of a/p showing enlarging fluid collection concerning for abscess. Now s/p IR drainage of abscess. New suture was placed to stabilize drain overnight.    Plan:   IR drainage on Monday  Continue heparin gtt until IR drainage  Restart Eliquis after IR procedure    [facility]  [phone]    Addendum:  Agree with resident note.  No acute issues overnight.  Continue antibiotics per ID.  Heparin drip - hold sunday night for IR drain placement Monday.  Danita Yo MD  MIS/Bariatric fellow

## 2019-11-17 LAB
ANION GAP SERPL CALC-SCNC: 13 MMOL/L — SIGNIFICANT CHANGE UP (ref 5–17)
APTT BLD: 105.3 SEC — HIGH (ref 27.5–36.3)
APTT BLD: 68.8 SEC — HIGH (ref 27.5–36.3)
APTT BLD: 87.2 SEC — HIGH (ref 27.5–36.3)
BUN SERPL-MCNC: 7 MG/DL — SIGNIFICANT CHANGE UP (ref 7–23)
CALCIUM SERPL-MCNC: 8.8 MG/DL — SIGNIFICANT CHANGE UP (ref 8.4–10.5)
CHLORIDE SERPL-SCNC: 101 MMOL/L — SIGNIFICANT CHANGE UP (ref 96–108)
CO2 SERPL-SCNC: 24 MMOL/L — SIGNIFICANT CHANGE UP (ref 22–31)
CREAT SERPL-MCNC: 0.51 MG/DL — SIGNIFICANT CHANGE UP (ref 0.5–1.3)
GLUCOSE SERPL-MCNC: 115 MG/DL — HIGH (ref 70–99)
HCT VFR BLD CALC: 25 % — LOW (ref 34.5–45)
HCT VFR BLD CALC: 25.4 % — LOW (ref 34.5–45)
HGB BLD-MCNC: 8 G/DL — LOW (ref 11.5–15.5)
HGB BLD-MCNC: 8.2 G/DL — LOW (ref 11.5–15.5)
MAGNESIUM SERPL-MCNC: 1.7 MG/DL — SIGNIFICANT CHANGE UP (ref 1.6–2.6)
MCHC RBC-ENTMCNC: 32 GM/DL — SIGNIFICANT CHANGE UP (ref 32–36)
MCHC RBC-ENTMCNC: 32.3 GM/DL — SIGNIFICANT CHANGE UP (ref 32–36)
MCHC RBC-ENTMCNC: 32.9 PG — SIGNIFICANT CHANGE UP (ref 27–34)
MCHC RBC-ENTMCNC: 34.3 PG — HIGH (ref 27–34)
MCV RBC AUTO: 102.9 FL — HIGH (ref 80–100)
MCV RBC AUTO: 106.3 FL — HIGH (ref 80–100)
NRBC # BLD: 0 /100 WBCS — SIGNIFICANT CHANGE UP (ref 0–0)
PHOSPHATE SERPL-MCNC: 3.6 MG/DL — SIGNIFICANT CHANGE UP (ref 2.5–4.5)
PLATELET # BLD AUTO: 285 K/UL — SIGNIFICANT CHANGE UP (ref 150–400)
PLATELET # BLD AUTO: 297 K/UL — SIGNIFICANT CHANGE UP (ref 150–400)
POTASSIUM SERPL-MCNC: 4 MMOL/L — SIGNIFICANT CHANGE UP (ref 3.5–5.3)
POTASSIUM SERPL-SCNC: 4 MMOL/L — SIGNIFICANT CHANGE UP (ref 3.5–5.3)
RBC # BLD: 2.39 M/UL — LOW (ref 3.8–5.2)
RBC # BLD: 2.43 M/UL — LOW (ref 3.8–5.2)
RBC # FLD: 16.4 % — HIGH (ref 10.3–14.5)
RBC # FLD: 16.6 % — HIGH (ref 10.3–14.5)
SODIUM SERPL-SCNC: 138 MMOL/L — SIGNIFICANT CHANGE UP (ref 135–145)
WBC # BLD: 10.06 K/UL — SIGNIFICANT CHANGE UP (ref 3.8–10.5)
WBC # BLD: 10.21 K/UL — SIGNIFICANT CHANGE UP (ref 3.8–10.5)
WBC # FLD AUTO: 10.06 K/UL — SIGNIFICANT CHANGE UP (ref 3.8–10.5)
WBC # FLD AUTO: 10.21 K/UL — SIGNIFICANT CHANGE UP (ref 3.8–10.5)

## 2019-11-17 RX ORDER — MAGNESIUM SULFATE 500 MG/ML
2 VIAL (ML) INJECTION ONCE
Refills: 0 | Status: COMPLETED | OUTPATIENT
Start: 2019-11-17 | End: 2019-11-17

## 2019-11-17 RX ORDER — NYSTATIN CREAM 100000 [USP'U]/G
1 CREAM TOPICAL EVERY 8 HOURS
Refills: 0 | Status: DISCONTINUED | OUTPATIENT
Start: 2019-11-17 | End: 2019-11-22

## 2019-11-17 RX ORDER — HEPARIN SODIUM 5000 [USP'U]/ML
800 INJECTION INTRAVENOUS; SUBCUTANEOUS
Qty: 25000 | Refills: 0 | Status: DISCONTINUED | OUTPATIENT
Start: 2019-11-17 | End: 2019-11-17

## 2019-11-17 RX ORDER — HEPARIN SODIUM 5000 [USP'U]/ML
800 INJECTION INTRAVENOUS; SUBCUTANEOUS
Qty: 25000 | Refills: 0 | Status: DISCONTINUED | OUTPATIENT
Start: 2019-11-17 | End: 2019-11-18

## 2019-11-17 RX ADMIN — MONTELUKAST 10 MILLIGRAM(S): 4 TABLET, CHEWABLE ORAL at 11:22

## 2019-11-17 RX ADMIN — CHLORHEXIDINE GLUCONATE 1 APPLICATION(S): 213 SOLUTION TOPICAL at 11:18

## 2019-11-17 RX ADMIN — HEPARIN SODIUM 8 UNIT(S)/HR: 5000 INJECTION INTRAVENOUS; SUBCUTANEOUS at 20:13

## 2019-11-17 RX ADMIN — Medication 1: at 13:45

## 2019-11-17 RX ADMIN — HEPARIN SODIUM 8 UNIT(S)/HR: 5000 INJECTION INTRAVENOUS; SUBCUTANEOUS at 13:12

## 2019-11-17 RX ADMIN — ERTAPENEM SODIUM 120 MILLIGRAM(S): 1 INJECTION, POWDER, LYOPHILIZED, FOR SOLUTION INTRAMUSCULAR; INTRAVENOUS at 00:44

## 2019-11-17 RX ADMIN — NYSTATIN CREAM 1 APPLICATION(S): 100000 CREAM TOPICAL at 11:17

## 2019-11-17 RX ADMIN — HEPARIN SODIUM 8 UNIT(S)/HR: 5000 INJECTION INTRAVENOUS; SUBCUTANEOUS at 06:13

## 2019-11-17 RX ADMIN — Medication 50 GRAM(S): at 11:17

## 2019-11-17 RX ADMIN — Medication 1 MILLIGRAM(S): at 11:17

## 2019-11-17 RX ADMIN — Medication 500 MILLIGRAM(S): at 11:17

## 2019-11-17 RX ADMIN — FAMOTIDINE 20 MILLIGRAM(S): 10 INJECTION INTRAVENOUS at 11:17

## 2019-11-17 RX ADMIN — LORATADINE 10 MILLIGRAM(S): 10 TABLET ORAL at 11:17

## 2019-11-17 NOTE — PROVIDER CONTACT NOTE (MEDICATION) - ACTION/TREATMENT ORDERED:
PA aware. Will adjust order and reduce rate to 9mL/hr, recheck PTT in 6 hours after rate changed. Will continue to monitor
DAVIE.O Fiona aware. Heparin drip kept at 9ml/hr. 2nd consecutive therapuetic. draw aptt labs daily from now on instead of q6.
MD aware. result therapeutic. pt kept on 10ml/hr heparin drip.
MD made aware. MD to decrease heparin drip rate, awaiting orders. will continue to monitor. pt safety maintained
MD made aware. keep heparin infusing at 9ml/hr

## 2019-11-17 NOTE — PROVIDER CONTACT NOTE (MEDICATION) - RECOMMENDATIONS
PA notified. Adjust rate?
MD made aware. keep heparin drip at 9ml/hr?
MD made aware. keep heparin running 10ml/hr
POPPY Jaimes aware. keep rate at 9ml/hr?
decrease heparin drip rate?

## 2019-11-17 NOTE — PROGRESS NOTE ADULT - SUBJECTIVE AND OBJECTIVE BOX
Green Team Surgery Progress Note     SUBJECTIVE / 24H EVENTS  Patient seen and examined on morning rounds. No acute events overnight.    OBJECTIVE:    VITAL SIGNS:  T(C): 37.1 (11-17-19 @ 00:29), Max: 37.1 (11-17-19 @ 00:29)  HR: 100 (11-17-19 @ 00:29) (100 - 104)  BP: 113/74 (11-17-19 @ 00:29) (96/60 - 114/72)  RR: 18 (11-17-19 @ 00:29) (18 - 18)  SpO2: 96% (11-17-19 @ 00:29) (94% - 96%)      POCT Blood Glucose.: 131 mg/dL (11-16-19 @ 22:17)  POCT Blood Glucose.: 146 mg/dL (11-16-19 @ 17:34)  POCT Blood Glucose.: 140 mg/dL (11-16-19 @ 12:16)      PHYSICAL EXAM:  Constitutional: A&Ox3, NAD  Respiratory: CTA bilaterally  Cardiac: S1 and S2  Gastrointestinal: abdomen soft, NT/ND, skin around drain site is slightly eroded; drain producing serous output; dressings c/d/i      11-15-19 @ 07:01  -  11-16-19 @ 07:00  --------------------------------------------------------  IN:    heparin Infusion: 154 mL    heparin Infusion: 80 mL    Oral Fluid: 760 mL    Solution: 250 mL    Solution: 50 mL    Solution: 50 mL  Total IN: 1344 mL    OUT:    Colostomy: 2 mL    Voided: 550 mL  Total OUT: 552 mL    Total NET: 792 mL      11-16-19 @ 07:01  -  11-17-19 @ 04:27  --------------------------------------------------------  IN:    heparin Infusion: 108 mL    Oral Fluid: 700 mL    Solution: 50 mL  Total IN: 858 mL    OUT:    Bulb: 30 mL    Colostomy: 101 mL    Voided: 750 mL  Total OUT: 881 mL    Total NET: -23 mL          LAB VALUES:  11-16    140  |  102  |  7   ----------------------------<  91  4.1   |  24  |  0.51    Ca    9.0      16 Nov 2019 06:16  Phos  3.1     11-16  Mg     1.8     11-16                                 8.4    10.18 )-----------( 312      ( 16 Nov 2019 10:32 )             25.4       PTT - ( 16 Nov 2019 03:23 )  PTT:93.3 sec            MICROBIOLOGY:    No new microbiology data for review.     RADIOLOGY:    No new radiographic images for review.    MEDICATIONS  (STANDING):  ascorbic acid 500 milliGRAM(s) Oral daily  chlorhexidine 2% Cloths 1 Application(s) Topical <User Schedule>  dextrose 5%. 1000 milliLiter(s) (50 mL/Hr) IV Continuous <Continuous>  dextrose 50% Injectable 12.5 Gram(s) IV Push once  dextrose 50% Injectable 25 Gram(s) IV Push once  dextrose 50% Injectable 25 Gram(s) IV Push once  ertapenem  IVPB 1000 milliGRAM(s) IV Intermittent every 24 hours  famotidine    Tablet 20 milliGRAM(s) Oral daily  folic acid 1 milliGRAM(s) Oral daily  heparin  Infusion 900 Unit(s)/Hr (9 mL/Hr) IV Continuous <Continuous>  insulin lispro (HumaLOG) corrective regimen sliding scale   SubCutaneous three times a day before meals  insulin lispro (HumaLOG) corrective regimen sliding scale   SubCutaneous at bedtime  loratadine 10 milliGRAM(s) Oral daily  montelukast 10 milliGRAM(s) Oral daily    MEDICATIONS  (PRN):  dextrose 40% Gel 15 Gram(s) Oral once PRN Blood Glucose LESS THAN 70 milliGRAM(s)/deciLiter  glucagon  Injectable 1 milliGRAM(s) IntraMuscular once PRN Glucose <70 milliGRAM(s)/deciLiter  sodium chloride 0.9% lock flush 10 milliLiter(s) IV Push every 1 hour PRN Pre/post blood products, medications, blood draw, and to maintain line patency Green Team Surgery Progress Note     SUBJECTIVE / 24H EVENTS  Patient seen and examined on morning rounds. No acute events overnight. Doing well, denies cp/sob, been OOB to chair    OBJECTIVE:    VITAL SIGNS:  T(C): 37.1 (11-17-19 @ 00:29), Max: 37.1 (11-17-19 @ 00:29)  HR: 100 (11-17-19 @ 00:29) (100 - 104)  BP: 113/74 (11-17-19 @ 00:29) (96/60 - 114/72)  RR: 18 (11-17-19 @ 00:29) (18 - 18)  SpO2: 96% (11-17-19 @ 00:29) (94% - 96%)      POCT Blood Glucose.: 131 mg/dL (11-16-19 @ 22:17)  POCT Blood Glucose.: 146 mg/dL (11-16-19 @ 17:34)  POCT Blood Glucose.: 140 mg/dL (11-16-19 @ 12:16)      PHYSICAL EXAM:  Constitutional: A&Ox3, NAD  Respiratory: CTA bilaterally  Cardiac: S1 and S2  Gastrointestinal: abdomen soft, NT/ND, skin around drain site is slightly eroded; drain producing serous output; dressings c/d/i      11-15-19 @ 07:01  -  11-16-19 @ 07:00  --------------------------------------------------------  IN:    heparin Infusion: 154 mL    heparin Infusion: 80 mL    Oral Fluid: 760 mL    Solution: 250 mL    Solution: 50 mL    Solution: 50 mL  Total IN: 1344 mL    OUT:    Colostomy: 2 mL    Voided: 550 mL  Total OUT: 552 mL    Total NET: 792 mL      11-16-19 @ 07:01  -  11-17-19 @ 04:27  --------------------------------------------------------  IN:    heparin Infusion: 108 mL    Oral Fluid: 700 mL    Solution: 50 mL  Total IN: 858 mL    OUT:    Bulb: 30 mL    Colostomy: 101 mL    Voided: 750 mL  Total OUT: 881 mL    Total NET: -23 mL          LAB VALUES:  11-16    140  |  102  |  7   ----------------------------<  91  4.1   |  24  |  0.51    Ca    9.0      16 Nov 2019 06:16  Phos  3.1     11-16  Mg     1.8     11-16                                 8.4    10.18 )-----------( 312      ( 16 Nov 2019 10:32 )             25.4       PTT - ( 16 Nov 2019 03:23 )  PTT:93.3 sec            MICROBIOLOGY:    No new microbiology data for review.     RADIOLOGY:    No new radiographic images for review.    MEDICATIONS  (STANDING):  ascorbic acid 500 milliGRAM(s) Oral daily  chlorhexidine 2% Cloths 1 Application(s) Topical <User Schedule>  dextrose 5%. 1000 milliLiter(s) (50 mL/Hr) IV Continuous <Continuous>  dextrose 50% Injectable 12.5 Gram(s) IV Push once  dextrose 50% Injectable 25 Gram(s) IV Push once  dextrose 50% Injectable 25 Gram(s) IV Push once  ertapenem  IVPB 1000 milliGRAM(s) IV Intermittent every 24 hours  famotidine    Tablet 20 milliGRAM(s) Oral daily  folic acid 1 milliGRAM(s) Oral daily  heparin  Infusion 900 Unit(s)/Hr (9 mL/Hr) IV Continuous <Continuous>  insulin lispro (HumaLOG) corrective regimen sliding scale   SubCutaneous three times a day before meals  insulin lispro (HumaLOG) corrective regimen sliding scale   SubCutaneous at bedtime  loratadine 10 milliGRAM(s) Oral daily  montelukast 10 milliGRAM(s) Oral daily    MEDICATIONS  (PRN):  dextrose 40% Gel 15 Gram(s) Oral once PRN Blood Glucose LESS THAN 70 milliGRAM(s)/deciLiter  glucagon  Injectable 1 milliGRAM(s) IntraMuscular once PRN Glucose <70 milliGRAM(s)/deciLiter  sodium chloride 0.9% lock flush 10 milliLiter(s) IV Push every 1 hour PRN Pre/post blood products, medications, blood draw, and to maintain line patency

## 2019-11-17 NOTE — PROGRESS NOTE ADULT - ASSESSMENT
74 year old female with a PMH of asthma, COPD, CHF, rate controlled afib, PPM, recent admission for perforated diverticulitis, s/p colostomy, hospital course complicated by abdominal abscesses discharged with BASIM drain to rehab now admitted with abdominal pain, ct of a/p showing enlarging fluid collection concerning for abscess. Now s/p IR drainage of abscess. New suture was placed to stabilize drain 11/15.     Plan:   IR drainage on Monday  Continue heparin gtt until IR drainage  Restart Eliquis after IR procedure    Green Surgery  [id number] 74 year old female with a PMH of asthma, COPD, CHF, rate controlled afib, PPM, recent admission for perforated diverticulitis, s/p colostomy, hospital course complicated by abdominal abscesses discharged with BASIM drain to rehab now admitted with abdominal pain, ct of a/p showing enlarging fluid collection concerning for abscess. Now s/p IR drainage of abscess. New suture was placed to stabilize drain 11/15.     Plan:   IR drainage on Monday  Continue heparin gtt until IR drainage  Restart Eliquis after IR procedure    Green Surgery  [id number]      Addendum  Agree with resident note.  No acute events.  Plan for IR drainage tomorrow.  Hold heparin drip at 2am for anticipated procedure tomorrow.  Will restart Eliquis after.  Danita Yo MD  MIS/bariatric fellow 74 year old female with a PMH of asthma, COPD, CHF, rate controlled afib, PPM, recent admission for perforated diverticulitis, s/p colostomy, hospital course complicated by abdominal abscesses discharged with BASIM drain to rehab now admitted with abdominal pain, ct of a/p showing enlarging fluid collection concerning for abscess. Now s/p IR drainage of abscess. New suture was placed to stabilize drain 11/15.     Plan:   IR drainage on Monday  Hold Hep prior to IR procedure  Continue heparin gtt until IR drainage  Restart Eliquis after IR procedure    Green Surgery  [id number] 74 year old female with a PMH of asthma, COPD, CHF, rate controlled afib, PPM, recent admission for perforated diverticulitis, s/p colostomy, hospital course complicated by abdominal abscesses discharged with BASIM drain to rehab now admitted with abdominal pain, ct of a/p showing enlarging fluid collection concerning for abscess. Now s/p IR drainage of abscess. New suture was placed to stabilize drain 11/15.     Plan:   IR drainage on Monday  Hold Hep prior to IR procedure  Continue heparin gtt until IR drainage  Restart Eliquis after IR procedure    Green Surgery  [id number]    Addendum:  Agree with resident note.  Note acute events.  Plan for IR drainage of fluid collection tomorrow.  Hold heparin at 2am for procedure.  Danita Yo MD

## 2019-11-18 ENCOUNTER — TRANSCRIPTION ENCOUNTER (OUTPATIENT)
Age: 74
End: 2019-11-18

## 2019-11-18 LAB
ANION GAP SERPL CALC-SCNC: 14 MMOL/L — SIGNIFICANT CHANGE UP (ref 5–17)
APTT BLD: 30.8 SEC — SIGNIFICANT CHANGE UP (ref 27.5–36.3)
APTT BLD: 30.9 SEC — SIGNIFICANT CHANGE UP (ref 27.5–36.3)
BUN SERPL-MCNC: 8 MG/DL — SIGNIFICANT CHANGE UP (ref 7–23)
CALCIUM SERPL-MCNC: 9.1 MG/DL — SIGNIFICANT CHANGE UP (ref 8.4–10.5)
CHLORIDE SERPL-SCNC: 101 MMOL/L — SIGNIFICANT CHANGE UP (ref 96–108)
CO2 SERPL-SCNC: 25 MMOL/L — SIGNIFICANT CHANGE UP (ref 22–31)
CREAT SERPL-MCNC: 0.63 MG/DL — SIGNIFICANT CHANGE UP (ref 0.5–1.3)
GLUCOSE SERPL-MCNC: 113 MG/DL — HIGH (ref 70–99)
HCT VFR BLD CALC: 24.7 % — LOW (ref 34.5–45)
HGB BLD-MCNC: 8.1 G/DL — LOW (ref 11.5–15.5)
INR BLD: 1.14 RATIO — SIGNIFICANT CHANGE UP (ref 0.88–1.16)
MAGNESIUM SERPL-MCNC: 1.6 MG/DL — SIGNIFICANT CHANGE UP (ref 1.6–2.6)
MCHC RBC-ENTMCNC: 32.8 GM/DL — SIGNIFICANT CHANGE UP (ref 32–36)
MCHC RBC-ENTMCNC: 35.1 PG — HIGH (ref 27–34)
MCV RBC AUTO: 106.9 FL — HIGH (ref 80–100)
PHOSPHATE SERPL-MCNC: 3.9 MG/DL — SIGNIFICANT CHANGE UP (ref 2.5–4.5)
PLATELET # BLD AUTO: 284 K/UL — SIGNIFICANT CHANGE UP (ref 150–400)
POTASSIUM SERPL-MCNC: 4 MMOL/L — SIGNIFICANT CHANGE UP (ref 3.5–5.3)
POTASSIUM SERPL-SCNC: 4 MMOL/L — SIGNIFICANT CHANGE UP (ref 3.5–5.3)
PROTHROM AB SERPL-ACNC: 13 SEC — SIGNIFICANT CHANGE UP (ref 10–13.1)
RBC # BLD: 2.31 M/UL — LOW (ref 3.8–5.2)
RBC # FLD: 16.4 % — HIGH (ref 10.3–14.5)
SODIUM SERPL-SCNC: 140 MMOL/L — SIGNIFICANT CHANGE UP (ref 135–145)
WBC # BLD: 12.16 K/UL — HIGH (ref 3.8–10.5)
WBC # FLD AUTO: 12.16 K/UL — HIGH (ref 3.8–10.5)

## 2019-11-18 PROCEDURE — 99232 SBSQ HOSP IP/OBS MODERATE 35: CPT

## 2019-11-18 RX ORDER — HEPARIN SODIUM 5000 [USP'U]/ML
800 INJECTION INTRAVENOUS; SUBCUTANEOUS
Qty: 25000 | Refills: 0 | Status: DISCONTINUED | OUTPATIENT
Start: 2019-11-18 | End: 2019-11-18

## 2019-11-18 RX ORDER — INSULIN LISPRO 100/ML
VIAL (ML) SUBCUTANEOUS
Refills: 0 | Status: DISCONTINUED | OUTPATIENT
Start: 2019-11-18 | End: 2019-11-19

## 2019-11-18 RX ORDER — HEPARIN SODIUM 5000 [USP'U]/ML
800 INJECTION INTRAVENOUS; SUBCUTANEOUS
Qty: 25000 | Refills: 0 | Status: DISCONTINUED | OUTPATIENT
Start: 2019-11-18 | End: 2019-11-19

## 2019-11-18 RX ORDER — INSULIN LISPRO 100/ML
VIAL (ML) SUBCUTANEOUS AT BEDTIME
Refills: 0 | Status: DISCONTINUED | OUTPATIENT
Start: 2019-11-18 | End: 2019-11-19

## 2019-11-18 RX ORDER — MAGNESIUM SULFATE 500 MG/ML
2 VIAL (ML) INJECTION ONCE
Refills: 0 | Status: COMPLETED | OUTPATIENT
Start: 2019-11-18 | End: 2019-11-18

## 2019-11-18 RX ORDER — ALBUTEROL 90 UG/1
2 AEROSOL, METERED ORAL EVERY 6 HOURS
Refills: 0 | Status: DISCONTINUED | OUTPATIENT
Start: 2019-11-18 | End: 2019-11-22

## 2019-11-18 RX ORDER — INSULIN LISPRO 100/ML
VIAL (ML) SUBCUTANEOUS EVERY 6 HOURS
Refills: 0 | Status: DISCONTINUED | OUTPATIENT
Start: 2019-11-18 | End: 2019-11-18

## 2019-11-18 RX ADMIN — Medication 1 MILLIGRAM(S): at 12:17

## 2019-11-18 RX ADMIN — LORATADINE 10 MILLIGRAM(S): 10 TABLET ORAL at 12:17

## 2019-11-18 RX ADMIN — MONTELUKAST 10 MILLIGRAM(S): 4 TABLET, CHEWABLE ORAL at 12:17

## 2019-11-18 RX ADMIN — Medication 50 GRAM(S): at 10:07

## 2019-11-18 RX ADMIN — FAMOTIDINE 20 MILLIGRAM(S): 10 INJECTION INTRAVENOUS at 12:17

## 2019-11-18 RX ADMIN — Medication 500 MILLIGRAM(S): at 12:17

## 2019-11-18 RX ADMIN — ERTAPENEM SODIUM 120 MILLIGRAM(S): 1 INJECTION, POWDER, LYOPHILIZED, FOR SOLUTION INTRAMUSCULAR; INTRAVENOUS at 03:50

## 2019-11-18 RX ADMIN — CHLORHEXIDINE GLUCONATE 1 APPLICATION(S): 213 SOLUTION TOPICAL at 12:16

## 2019-11-18 RX ADMIN — HEPARIN SODIUM 8 UNIT(S)/HR: 5000 INJECTION INTRAVENOUS; SUBCUTANEOUS at 19:20

## 2019-11-18 NOTE — DISCHARGE NOTE PROVIDER - NSDCFUADDAPPT_GEN_ALL_CORE_FT
Please follow up with Dr. Goode Infectious Disease out patient in 2 weeks  Please call and make an appointment to follow up with IR when BASIM drain output is less than 10cc [phone]  Follow up with Primary Care Physician in 1-2 weeks after discharge

## 2019-11-18 NOTE — DISCHARGE NOTE PROVIDER - NSDCCPCAREPLAN_GEN_ALL_CORE_FT
PRINCIPAL DISCHARGE DIAGNOSIS  Diagnosis: Abdominal wall abscess  Assessment and Plan of Treatment: ANTIBIOTICS: Continue iv antibiotics invanz for 7 days.  Will need CBC and CMP in 1 week.  Follow up with Dr. Goode, infectious disease in 10 days. Please call to make appointment.  WOUND CARE: Suction drain, flush every other day 10 cc normal saline to prevent clogging.   BATHING: Please do not submerge wound underwater. You may shower and/or sponge bathe.  ACTIVITY: No heavy lifting anything more than 10-15lbs or straining. Otherwise, you may return to your usual level of physical activity. If you are taking narcotic pain medication (such as Percocet), do NOT drive a car, operate machinery or make important decisions.  NOTIFY YOUR SURGEON IF: You have any bleeding that does not stop, any pus draining from your wound, any fever (over 100.4 F) or chills, persistent nausea/vomiting with inability to tolerate food or liquids, persistent diarrhea, or if your pain is not controlled on your discharge pain medications.  FOLLOW-UP:  1. Please call to make a follow-up appointment within one week of discharge.   2. Please follow up with your primary care physician in one week regarding your hospitalization. PRINCIPAL DISCHARGE DIAGNOSIS  Diagnosis: Abdominal wall abscess  Assessment and Plan of Treatment: ANTIBIOTICS: continue Ertapenem 1g IV Q24H (End Date: 11/25/19). Patient was advised to follow up with Dr. Goode in the Infectious Diseases Office in 2 weeks time. Please call to make appointment.  WOUND CARE: Suction drain, flush every other day 10 cc normal saline to prevent clogging.   BATHING: Please do not submerge wound underwater. You may shower and/or sponge bathe.  ACTIVITY: No heavy lifting anything more than 10-15lbs or straining. Otherwise, you may return to your usual level of physical activity. If you are taking narcotic pain medication (such as Percocet), do NOT drive a car, operate machinery or make important decisions.  NOTIFY YOUR SURGEON IF: You have any bleeding that does not stop, any pus draining from your wound, any fever (over 100.4 F) or chills, persistent nausea/vomiting with inability to tolerate food or liquids, persistent diarrhea, or if your pain is not controlled on your discharge pain medications.  FOLLOW-UP:  1. Please call to make a follow-up appointment within one week of discharge with Dr. Alvarado   2. Please follow up with your primary care physician in one week regarding your hospitalization.

## 2019-11-18 NOTE — PROGRESS NOTE ADULT - SUBJECTIVE AND OBJECTIVE BOX
Follow Up:  Intraabdominal Abscess    Interval History: No chills or fevers overnight. Continued LLQ abdominal pain.     REVIEW OF SYSTEMS  [  ] ROS unobtainable because:    [  x] All other systems negative except as noted below    Constitutional:  [ ] fever [ ] chills  [ ] weight loss  [ ] weakness  Skin:  [ ] rash [ ] phlebitis	  Eyes: [ ] icterus [ ] pain  [ ] discharge	  ENMT: [ ] sore throat  [ ] thrush [ ] ulcers [ ] exudates  Respiratory: [ ] dyspnea [ ] hemoptysis [ ] cough [ ] sputum	  Cardiovascular:  [ ] chest pain [ ] palpitations [ ] edema	  Gastrointestinal:  [ ] nausea [ ] vomiting [ ] diarrhea [ ] constipation [ x] pain	  Genitourinary:  [ ] dysuria [ ] frequency [ ] hematuria [ ] discharge [ ] flank pain  [ ] incontinence  Musculoskeletal:  [ ] myalgias [ ] arthralgias [ ] arthritis  [ ] back pain  Neurological:  [ ] headache [ ] seizures  [ ] confusion/altered mental status    Allergies  Coreg (Other)  digoxin (Other; Short breath (Mild to Mod))  IV Contrast (Unknown)  penicillins (Hives)        ANTIMICROBIALS:  ertapenem  IVPB 1000 every 24 hours      OTHER MEDS:  MEDICATIONS  (STANDING):  ALBUTerol    90 MICROgram(s) HFA Inhaler 2 every 6 hours PRN  dextrose 40% Gel 15 once PRN  dextrose 50% Injectable 12.5 once  dextrose 50% Injectable 25 once  dextrose 50% Injectable 25 once  famotidine    Tablet 20 daily  glucagon  Injectable 1 once PRN  insulin lispro (HumaLOG) corrective regimen sliding scale  every 6 hours  loratadine 10 daily  montelukast 10 daily      Vital Signs Last 24 Hrs  T(C): 36.9 (18 Nov 2019 13:38), Max: 37.3 (18 Nov 2019 05:59)  T(F): 98.4 (18 Nov 2019 13:38), Max: 99.2 (18 Nov 2019 05:59)  HR: 106 (18 Nov 2019 13:38) (102 - 106)  BP: 99/62 (18 Nov 2019 13:38) (98/63 - 107/54)  BP(mean): --  RR: 18 (18 Nov 2019 13:38) (17 - 18)  SpO2: 94% (18 Nov 2019 13:38) (93% - 95%)    PHYSICAL EXAMINATION:  General: Alert and Awake, NAD  HEENT: PERRL, EOMI, No subconjunctival hemorrhages, Oropharynx Clear, MMM  Neck: Supple, No SARAHI  Cardiac: RRR, No M/R/G  Resp: CTAB, No Wh/Rh/Ra  Abdomen: +RLQ ostomy, lower midline abdominal wall defect with purulent drainage, LLQ drain with purulent drainage, NBS, ND, mild diffuse tenderness to palpation worst in LLQ, No HSM, No rigidity or guarding  MSK: trace to 1+ LE edema. No stigmata of IE. No evidence of phlebitis. No evidence of synovitis.  : External urinary catheter  Skin: No rashes or lesions. Skin is warm and dry to the touch.   Neuro: Alert and Awake. CN 2-12 Grossly intact. Moves all four extremities spontaneously.  Psych: Calm, Pleasant, Cooperative                          8.1    12.16 )-----------( 284      ( 18 Nov 2019 08:39 )             24.7       11-18    140  |  101  |  8   ----------------------------<  113<H>  4.0   |  25  |  0.63    Ca    9.1      18 Nov 2019 06:30  Phos  3.9     11-18  Mg     1.6     11-18            MICROBIOLOGY:  v  .Body Fluid Abdominal Fluid  11-12-19   Moderate Escherichia coli  Moderate Bacteroides thetaiotamcron group "Susceptibilities not performed"  Few Alpha hemolytic strep "Susceptibilities not performed"  --  Escherichia coli      URINE MIDSTREAM  11-08-19 --  --  --      BLOOD PERIPHERAL  11-08-19 --  --  --      RADIOLOGY:    EXAM:  CT ABDOMEN AND PELVIS IC                        PROCEDURE DATE:  11/13/2019    Diverticulosis without diverticulitis. Status post left hemicolectomy.  Persistent left ventral abdominal wall subcutaneous collection that measures up to 6.0 cm.  Enlarging left anterior abdominal wall fluid collection and new midline inferior subcutaneous collection measuring up to 5.9 cm.

## 2019-11-18 NOTE — DISCHARGE NOTE PROVIDER - CARE PROVIDERS DIRECT ADDRESSES
[email or web address]

## 2019-11-18 NOTE — CHART NOTE - NSCHARTNOTEFT_GEN_A_CORE
IR procedure cxl today 2/2 unknown battery status of AICD.  Spoke to covering EP #[phone], will interrogate device in the AM, patient on schedule for IR pending interrogation.    JANAY Block  [id number]

## 2019-11-18 NOTE — PROGRESS NOTE ADULT - ASSESSMENT
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 WBC on admission of 11.69 with 70% PMN. CT A/P with increased size left ventral wall subcutaneous collection now measuring 4.5 cm.     IR drained small collection on 11/12. Prelim abscess cx with E coli, Bacteroides, AHS    Overall, worsening Intraabdominal Abscess, Rectal Stump Fistula, Leukocytosis, positive cx finding,     --Continue Ertapenem 1g IV Q24H   --plan for IR guided drainage of the collection today week, once the drain is placed will do 7 days of abx and then stop.   --High likelihood of persistent leak, but pt not a surgical candidate at this point, will attempt source control with drainage catheter.   --Continue to follow CBC with diff  --Follow up on preliminary blood cultures, NTD    I will continue to follow. Please feel free to contact me with any further questions.    Rex Goode M.D.  [facility] Division of Infectious Disease  8AM-5PM: Pager Number [phone]  After Hours (or if no response): Please contact the Infectious Diseases Office at [phone]

## 2019-11-18 NOTE — PROGRESS NOTE ADULT - ASSESSMENT
74 year old female with a PMH of asthma, COPD, CHF, rate controlled afib, PPM, recent admission for perforated diverticulitis, s/p colostomy, hospital course complicated by abdominal abscesses discharged with BASIM drain to rehab now admitted with abdominal pain, ct of a/p showing enlarging fluid collection concerning for abscess. Now s/p IR drainage of abscess. New suture was placed to stabilize drain 11/15.     Plan:   IR drainage on Monday  Heparin held overnight  Restart Eliquis after IR procedure    Green Surgery  [phone] 74 year old female with a PMH of asthma, COPD, CHF, rate controlled afib, PPM, recent admission for perforated diverticulitis, s/p colostomy, hospital course complicated by abdominal abscesses discharged with BASIM drain to rehab now admitted with abdominal pain, ct of a/p showing enlarging fluid collection concerning for abscess. Now s/p IR drainage of abscess. New suture was placed to stabilize drain 11/15.     Plan:   IR drainage on Monday  Heparin held overnight at 2AM  Restart Eliquis after IR procedure  wound care for draining wound    Green Surgery  [phone]

## 2019-11-18 NOTE — DISCHARGE NOTE PROVIDER - CARE PROVIDER_API CALL
Clark Alvarado)  Surgery  [address], [address]  [place], [place] [address]  Phone: [phone]  Fax: [phone]  Follow Up Time:     Rex Goode)  Internal Medicine  [address] [place] [address]  Phone: [phone]  Fax: [phone]  Follow Up Time: 1 week

## 2019-11-18 NOTE — DISCHARGE NOTE PROVIDER - HOSPITAL COURSE
74 year old female with a PMH of asthma, COPD, CHF, afib, PPM, recent admission for perforated diverticulitis of the transverse colon 7/2019 which required an ex-lap for feculent peritonitis and an end transverse colostomy, hospital course complicated resulted in an open abdomen that was eventually closed on 7/11/19 with strattice mesh. Pt had further complication with abdominal abscesses discharged with BASIM drain to rehab now coming in with increased LLQ pain and pain at the BASIM drain site. Pt denies any nausea, vomiting, fever, chills, sob, chest pain, dysuria or any other complaints.  Pt is on Ertapenem at the rehab, PICC line on the Rt arm.        The patient complains of LLQ pain on palpation.  CT shows subcutaneous fluid collection over LLQ not in area of drain.  On heparin drip for A flutter. Admitted to Surgery. Continue IV Invanz. Hold heparin drip this AM for possible IR drainage.  On 11/12, IR, ID, and Medicine consulted. On 11/13, Pt 74 year old female with a PMH of asthma, COPD, CHF, afib, PPM, recent admission for perforated diverticulitis of the transverse colon 7/2019 which required an ex-lap for feculent peritonitis and an end transverse colostomy, hospital course complicated resulted in an open abdomen that was eventually closed on 7/11/19 with strattice mesh. Pt had further complication with abdominal abscesses discharged with BASIM drain to rehab now coming in with increased LLQ pain and pain at the BASIM drain site. Pt denies any nausea, vomiting, fever, chills, sob, chest pain, dysuria or any other complaints.  Pt is on Ertapenem at the rehab, PICC line on the Rt arm.        The patient complains of LLQ pain on palpation.  CT shows subcutaneous fluid collection over LLQ not in area of drain.  On heparin drip for A flutter. Admitted to Surgery. Continue IV Invanz. Hold heparin drip this AM for possible IR drainage.  On 11/12, IR, ID, and Medicine consulted. On 11/13, Pt went to IR for US guided aspiration of a tiny collection in the L hemoabd wall, 7cc pus aspirated, ID recommended adding Vancomycin 750 mg IV Q12H. Trough prior to fourth dose. Pt had a CT of the abdomen which showed persistent left ventral abdominal wall subcutaneous collection that measures up to 6.0 cm. Enlarging left anterior abdominal wall fluid collection and new midline inferior subcutaneous collection measuring up to 5.9 cm.11/14: pt was started back on a regular diet and continued on eliquis. Her dressing was changed and Aquacel and ABD was reapplied covered with tape. Her wound has +purulent discharge. IR was called for another abscess drainage procedure. IR wants pt to be off eliquis x 48 hours before they do the procedure. Patient is on the schedule for Monday.11/15:WBC count is trending up, ID stopped vanco, no enterococcus in culture, Physical therapy was consulted and recommended subacute Rehab. 11/16: continue heparin gtt until IR drainage monday, Restart Eliquis after IR procedure. 11/18: ID recommended continuing Ertapenem 1g IV Q24H , plan for IR guided drainage of the collection today,, once the drain is placed will do 7 days of abx and then stop. 11/19: Percutaneous drainage procedure 11/18 cancelled by IR, rescheduled for today. ID discharge recommendation include continuing Ertapenem 1g IV Q24H (End Date: 11/25/19), recommend CBC with Diff and CMP 1 week from discharge. Please have results faxed to [phone]. Patient was advised to follow up with me in the Infectious Diseases Office in ~2 weeks time. Patient was provided with my office contact number of [phone]. 11/20: pt was restarted on eliquis and heparin stopped. IR d/c recommendations follow up with IR when the BASIM output is less than 10cc. If discharged call to make appt [phone]. 74 year old female with a PMH of asthma, COPD, CHF, afib, PPM, recent admission for perforated diverticulitis of the transverse colon 7/2019 which required an ex-lap for feculent peritonitis and an end transverse colostomy, hospital course complicated resulted in an open abdomen that was eventually closed on 7/11/19 with strattice mesh. Pt had further complication with abdominal abscesses discharged with BASIM drain to rehab now coming in with increased LLQ pain and pain at the BASIM drain site. Pt denies any nausea, vomiting, fever, chills, sob, chest pain, dysuria or any other complaints.  Pt is on Ertapenem at the rehab, PICC line on the Rt arm.        The patient complains of LLQ pain on palpation.  CT shows subcutaneous fluid collection over LLQ not in area of drain.  On heparin drip for A flutter. Admitted to Surgery. Continue IV Invanz. Hold heparin drip this AM for possible IR drainage.  On 11/12, IR, ID, and Medicine consulted. On 11/13, Pt went to IR for US guided aspiration of a tiny collection in the L hemoabd wall, 7cc pus aspirated, ID recommended adding Vancomycin 750 mg IV Q12H. Trough prior to fourth dose. Pt had a CT of the abdomen which showed persistent left ventral abdominal wall subcutaneous collection that measures up to 6.0 cm. Enlarging left anterior abdominal wall fluid collection and new midline inferior subcutaneous collection measuring up to 5.9 cm.11/14: pt was started back on a regular diet and continued on eliquis. Her dressing was changed and Aquacel and ABD was reapplied covered with tape. Her wound has +purulent discharge. IR was called for another abscess drainage procedure. IR wants pt to be off eliquis x 48 hours before they do the procedure. Patient is on the schedule for Monday.11/15:WBC count is trending up, ID stopped vanco, no enterococcus in culture, Physical therapy was consulted and recommended subacute Rehab. 11/16: continue heparin gtt until IR drainage monday, Restart Eliquis after IR procedure. 11/18: ID recommended continuing Ertapenem 1g IV Q24H , plan for IR guided drainage of the collection today,, once the drain is placed will do 7 days of abx and then stop. 11/19: Percutaneous drainage procedure 11/18 cancelled by IR, rescheduled for today. ID discharge recommendation include continuing Ertapenem 1g IV Q24H (End Date: 11/25/19), recommend CBC with Diff and CMP 1 week from discharge. Please have results faxed to [phone]. Patient was advised to follow up with Dr. Goode in the Infectious Diseases Office in 2 weeks time. Patient was provided with my office contact number of [phone]. 11/20: pt was restarted on eliquis and heparin stopped. IR d/c recommendations follow up with IR when the BASIM output is less than 10cc. If discharged call to make appt [phone]. Pt will be going to [facility] with 2 BASIM drains, PICC and ostomy bag. Drain instructions: flush both BASIM drain with 10cc of saline every other day.         On day of discharge, pt is the  hemodynamically stable, patient was tolerating diet and pain controlled. 74 year old female with a PMH of asthma, COPD, CHF, afib, PPM, recent admission for perforated diverticulitis of the transverse colon 7/2019 which required an ex-lap for feculent peritonitis and an end transverse colostomy, hospital course complicated resulted in an open abdomen that was eventually closed on 7/11/19 with strattice mesh. Pt had further complication with abdominal abscesses discharged with BASIM drain to rehab now coming in with increased LLQ pain and pain at the BASIM drain site. Pt denies any nausea, vomiting, fever, chills, sob, chest pain, dysuria or any other complaints.  Pt is on Ertapenem at the rehab, PICC line on the Rt arm.        The patient complains of LLQ pain on palpation.  CT shows subcutaneous fluid collection over LLQ not in area of drain.  On heparin drip for A flutter. Admitted to Surgery. Continue IV Invanz. Hold heparin drip this AM for possible IR drainage.  On 11/12, IR, ID, and Medicine consulted. On 11/13, Pt went to IR for US guided aspiration of a tiny collection in the L hemoabd wall, 7cc pus aspirated, ID recommended adding Vancomycin 750 mg IV Q12H. Trough prior to fourth dose. Pt had a CT of the abdomen which showed persistent left ventral abdominal wall subcutaneous collection that measures up to 6.0 cm. Enlarging left anterior abdominal wall fluid collection and new midline inferior subcutaneous collection measuring up to 5.9 cm.11/14: pt was started back on a regular diet and continued on eliquis. Her dressing was changed and Aquacel and ABD was reapplied covered with tape. Her wound has +purulent discharge. IR was called for another abscess drainage procedure. IR wants pt to be off eliquis x 48 hours before they do the procedure. Patient is on the schedule for Monday.11/15:WBC count is trending up, ID stopped vanco, no enterococcus in culture, Physical therapy was consulted and recommended subacute Rehab. 11/16: continue heparin gtt until IR drainage monday, Restart Eliquis after IR procedure. 11/18: ID recommended continuing Ertapenem 1g IV Q24H , plan for IR guided drainage of the collection today,, once the drain is placed will do 7 days of abx and then stop. 11/19: Percutaneous drainage procedure 11/18 cancelled by IR, rescheduled for today. ID discharge recommendation include continuing Ertapenem 1g IV Q24H (End Date: 11/25/19), recommend CBC with Diff and CMP 1 week from discharge. Please have results faxed to [phone]. Patient was advised to follow up with Dr. Goode in the Infectious Diseases Office in 2 weeks time. Patient was provided with my office contact number of [phone]. 11/20: pt was restarted on eliquis and heparin stopped. IR d/c recommendations follow up with IR when the BASIM output is less than 10cc. If discharged call to make appt [phone]. Pt will be going to [facility] with 2 BASIM drains, PICC and ostomy bag. Drain instructions: flush both BASIM drain with 10cc of saline every other day.         On day of discharge, pt is the  hemodynamically stable, patient was tolerating diet and pain controlled.

## 2019-11-18 NOTE — PROGRESS NOTE ADULT - SUBJECTIVE AND OBJECTIVE BOX
Surgery Progress Note    S: Patient seen and examined. No acute events overnight. Reports tolerating diet without nausea, vomiting, passing flatus, having bowel movements, voiding without issues, have been ambulating and out of bed. Denies fever, chills, SOB, chest pain.     O:  Physical Exam:  Gen: Laying in bed, NAD  HEENT: atrumatic, EMOI  Resp: Unlabored breathing  Abd: soft, karmen-incisional tenderness, nondistended, no rebound or guarding. Drains serosanguinous.   Ext: Moves 4 extremities spontaneously    Vital Signs Last 24 Hrs  T(C): 37 (17 Nov 2019 21:40), Max: 37.1 (17 Nov 2019 00:29)  T(F): 98.6 (17 Nov 2019 21:40), Max: 98.7 (17 Nov 2019 00:29)  HR: 102 (17 Nov 2019 21:40) (99 - 104)  BP: 107/54 (17 Nov 2019 21:40) (96/65 - 113/74)  BP(mean): --  RR: 18 (17 Nov 2019 21:40) (18 - 18)  SpO2: 95% (17 Nov 2019 21:40) (93% - 96%)    I&O's Detail    16 Nov 2019 07:01  -  17 Nov 2019 07:00  --------------------------------------------------------  IN:    heparin Infusion: 108 mL    Oral Fluid: 700 mL    Solution: 50 mL  Total IN: 858 mL    OUT:    Bulb: 30 mL    Colostomy: 101 mL    Voided: 1150 mL  Total OUT: 1281 mL    Total NET: -423 mL      17 Nov 2019 07:01  -  18 Nov 2019 00:01  --------------------------------------------------------  IN:    heparin Infusion: 16 mL    heparin Infusion: 80 mL    Oral Fluid: 720 mL    Solution: 100 mL  Total IN: 916 mL    OUT:    Bulb: 15 mL    Voided: 100 mL  Total OUT: 115 mL    Total NET: 801 mL                                8.2    10.21 )-----------( 297      ( 17 Nov 2019 09:29 )             25.4       11-17    138  |  101  |  7   ----------------------------<  115<H>  4.0   |  24  |  0.51    Ca    8.8      17 Nov 2019 05:14  Phos  3.6     11-17  Mg     1.7     11-17 Surgery Progress Note    S: Patient seen and examined. No acute events overnight. Reports tolerating diet without nausea, vomiting, passing flatus, having bowel movements, voiding without issues, have been ambulating and out of bed. Denies fever, chills, SOB, chest pain.     O:  Physical Exam:  Gen: Laying in bed, NAD  HEENT: atrumatic, EMOI  Resp: Unlabored breathing  Abd: soft, karmen-incisional tenderness, nondistended, no rebound or guarding. Drains L serous, R stoma +gas. aquacell on granulation tissue, purulent drain in midline area  Ext: Moves 4 extremities spontaneously    Vital Signs Last 24 Hrs  T(C): 37 (17 Nov 2019 21:40), Max: 37.1 (17 Nov 2019 00:29)  T(F): 98.6 (17 Nov 2019 21:40), Max: 98.7 (17 Nov 2019 00:29)  HR: 102 (17 Nov 2019 21:40) (99 - 104)  BP: 107/54 (17 Nov 2019 21:40) (96/65 - 113/74)  BP(mean): --  RR: 18 (17 Nov 2019 21:40) (18 - 18)  SpO2: 95% (17 Nov 2019 21:40) (93% - 96%)    I&O's Detail    16 Nov 2019 07:01  -  17 Nov 2019 07:00  --------------------------------------------------------  IN:    heparin Infusion: 108 mL    Oral Fluid: 700 mL    Solution: 50 mL  Total IN: 858 mL    OUT:    Bulb: 30 mL    Colostomy: 101 mL    Voided: 1150 mL  Total OUT: 1281 mL    Total NET: -423 mL      17 Nov 2019 07:01  -  18 Nov 2019 00:01  --------------------------------------------------------  IN:    heparin Infusion: 16 mL    heparin Infusion: 80 mL    Oral Fluid: 720 mL    Solution: 100 mL  Total IN: 916 mL    OUT:    Bulb: 15 mL    Voided: 100 mL  Total OUT: 115 mL    Total NET: 801 mL                                8.2    10.21 )-----------( 297      ( 17 Nov 2019 09:29 )             25.4       11-17    138  |  101  |  7   ----------------------------<  115<H>  4.0   |  24  |  0.51    Ca    8.8      17 Nov 2019 05:14  Phos  3.6     11-17  Mg     1.7     11-17

## 2019-11-18 NOTE — DISCHARGE NOTE PROVIDER - NSDCMRMEDTOKEN_GEN_ALL_CORE_FT
acetaminophen 325 mg oral tablet: 2 tab(s) orally every 6 hours, As needed, Mild Pain (1 - 3), Moderate Pain (4 - 6)  ascorbic acid 500 mg oral tablet: 1 tab(s) orally once a day  atorvastatin 20 mg oral tablet: 1 tab(s) orally once a day (at bedtime)  Claritin 10 mg oral tablet: 1 tab(s) orally once a day  enalapril 5 mg oral tablet: 1 tab(s) orally once a day  famotidine 20 mg oral tablet: 1 tab(s) orally once a day  ferrous sulfate 325 mg (65 mg elemental iron) oral tablet: 1 tab(s) orally 2 times a day  folic acid 1 mg oral tablet: 1 tab(s) orally once a day  furosemide 40 mg oral tablet: 1 tab(s) orally once a day  heparin: 5500 unit(s) injectable every 6 hours, As Needed   heparin: 2500 unit(s) injectable every 6 hours, As Needed 40-57  heparin 100 units/mL-D5% intravenous solution: 12 milliliter(s)hour intravenous   magnesium oxide 400 mg (241.3 mg elemental magnesium) oral tablet: 1 tab(s) orally 3 times a day (with meals)  montelukast 10 mg oral tablet: 1 tab(s) orally once a day  morphine: 2 milligram(s) injectable 2 times a day, As Needed  spironolactone 25 mg oral tablet: 1 tab(s) orally once a day acetaminophen 325 mg oral tablet: 2 tab(s) orally every 6 hours, As needed, Mild Pain (1 - 3), Moderate Pain (4 - 6)  albuterol 90 mcg/inh inhalation aerosol: 2 puff(s) inhaled every 6 hours, As needed, Shortness of Breath and/or Wheezing  apixaban 5 mg oral tablet: 1 tab(s) orally every 12 hours  ascorbic acid 500 mg oral tablet: 1 tab(s) orally once a day  atorvastatin 20 mg oral tablet: 1 tab(s) orally once a day (at bedtime)  Claritin 10 mg oral tablet: 1 tab(s) orally once a day  enalapril 5 mg oral tablet: 1 tab(s) orally once a day  famotidine 20 mg oral tablet: 1 tab(s) orally once a day  ferrous sulfate 325 mg (65 mg elemental iron) oral tablet: 1 tab(s) orally 2 times a day  folic acid 1 mg oral tablet: 1 tab(s) orally once a day  guaiFENesin 100 mg/5 mL oral liquid: 5 milliliter(s) orally every 6 hours, As needed, Cough  heparin: 5500 unit(s) injectable every 6 hours, As Needed   heparin: 2500 unit(s) injectable every 6 hours, As Needed 40-57  heparin 100 units/mL-D5% intravenous solution: 12 milliliter(s)hour intravenous   magnesium oxide 400 mg (241.3 mg elemental magnesium) oral tablet: 1 tab(s) orally 3 times a day (with meals)  montelukast 10 mg oral tablet: 1 tab(s) orally once a day  nystatin 100,000 units/g topical powder: 1 application topically every 8 hours, As needed, Rash  spironolactone 25 mg oral tablet: 1 tab(s) orally once a day acetaminophen 325 mg oral tablet: 2 tab(s) orally every 6 hours, As needed, Mild Pain (1 - 3), Moderate Pain (4 - 6)  apixaban 5 mg oral tablet: 1 tab(s) orally every 12 hours  ascorbic acid 500 mg oral tablet: 1 tab(s) orally once a day  Aspir 81 oral delayed release tablet: 1 tab(s) orally once a day  atorvastatin 20 mg oral tablet: 1 tab(s) orally once a day (at bedtime)  cetirizine 10 mg oral tablet: 1 tab(s) orally once a day  enalapril 10 mg oral tablet: 1 tab(s) orally 2 times a day  ertapenem 1 g injection: 1000 milligram(s) injectable once a day  ferrous sulfate 325 mg (65 mg elemental iron) oral tablet: 1 tab(s) orally 2 times a day  folic acid 1 mg oral tablet: 1 tab(s) orally once a day  furosemide 40 mg oral tablet: 1  orally 2 times a day  guaiFENesin 100 mg/5 mL oral liquid: 5 milliliter(s) orally every 6 hours, As needed, Cough  magnesium oxide 500 mg oral tablet: 1 tab(s) orally 3 times a day max daily dose 3 tablets   montelukast 10 mg oral tablet: 1 tab(s) orally once a day  nystatin 100,000 units/g topical powder: 1 application topically every 8 hours, As needed, Rash  omeprazole 40 mg oral delayed release capsule: 1 cap(s) orally once a day  repaglinide 0.5 mg oral tablet: 1 tab(s) orally 3 times a day (before meals)  spironolactone 25 mg oral tablet: 1 tab(s) orally once a day  Symbicort 160 mcg-4.5 mcg/inh inhalation aerosol: 2 puff(s) inhaled once a day (at bedtime)  Ventolin HFA 90 mcg/inh inhalation aerosol: 1 puff(s) inhaled every 4 hours-6 hours as needed acetaminophen 325 mg oral tablet: 2 tab(s) orally every 6 hours, As needed, Mild Pain (1 - 3), Moderate Pain (4 - 6)  apixaban 5 mg oral tablet: 1 tab(s) orally every 12 hours  ascorbic acid 500 mg oral tablet: 1 tab(s) orally once a day  Aspir 81 oral delayed release tablet: 1 tab(s) orally once a day  atorvastatin 20 mg oral tablet: 1 tab(s) orally once a day (at bedtime)  cetirizine 10 mg oral tablet: 1 tab(s) orally once a day  enalapril 10 mg oral tablet: 1 tab(s) orally 2 times a day  ertapenem 1 g injection: 1000 milligram(s) injectable once a day  ferrous sulfate 325 mg (65 mg elemental iron) oral tablet: 1 tab(s) orally 2 times a day  folic acid 1 mg oral tablet: 1 tab(s) orally once a day  furosemide 40 mg oral tablet: 1  orally 2 times a day  guaiFENesin 100 mg/5 mL oral liquid: 5 milliliter(s) orally every 6 hours, As needed, Cough  magnesium oxide 500 mg oral tablet: 1 tab(s) orally 3 times a day max daily dose 3 tablets   montelukast 10 mg oral tablet: 1 tab(s) orally once a day  nystatin 100,000 units/g topical powder: 1 application topically every 8 hours, As needed, Rash  omeprazole 40 mg oral delayed release capsule: 1 cap(s) orally once a day  potassium chloride 20 mEq/15 mL oral liquid: 15 milliliter(s) orally once a day only as needed   repaglinide 0.5 mg oral tablet: 1 tab(s) orally 3 times a day (before meals)  spironolactone 25 mg oral tablet: 1 tab(s) orally once a day  Symbicort 160 mcg-4.5 mcg/inh inhalation aerosol: 2 puff(s) inhaled once a day (at bedtime)  Ventolin HFA 90 mcg/inh inhalation aerosol: 1 puff(s) inhaled every 4 hours-6 hours as needed

## 2019-11-18 NOTE — DISCHARGE NOTE PROVIDER - NSDCFUSCHEDAPPT_GEN_ALL_CORE_FT
ANDRE BARKER ; 12/02/2019 ; [facility] ANDRE Hogan ; 02/13/2020 ; [facility] ANDRE BRAKER ; 12/02/2019 ; [facility] ANDRE Hogan ; 02/13/2020 ; [facility] ANDRE BARKER ; 12/02/2019 ; [facility] ANDRE Hoagn ; 02/13/2020 ; [facility]

## 2019-11-18 NOTE — DISCHARGE NOTE PROVIDER - NSDCFUADDINST_GEN_ALL_CORE_FT
You have 2 BASIM drains will flush each drain with 10cc of saline every other day  Will continue Ertapenem 1g IV Q24H (End Date: 11/25/19),   Will go for blood work and get CBC with Diff and CMP 1 week from discharge. You have 2 BASIM drains will gently forward flush only each drain with 10cc of saline every other day  Will continue Ertapenem 1g IV Q24H (End Date: 11/25/19),   Will go for blood work and get CBC with Diff and CMP 1 week from discharge. You have 2 BASIM (shorter lengths) drains will gently forward flush only each drain with 10cc of saline  daily.  The 3rd drain (longest length drain), please forward flush 10cc saline every other day.  Will continue Ertapenem 1g IV Q24H (End Date: 11/25/19),   Will go for blood work and get CBC with Diff and CMP 1 week from discharge.

## 2019-11-19 LAB
ANION GAP SERPL CALC-SCNC: 13 MMOL/L — SIGNIFICANT CHANGE UP (ref 5–17)
APTT BLD: 32.9 SEC — SIGNIFICANT CHANGE UP (ref 27.5–36.3)
BUN SERPL-MCNC: 7 MG/DL — SIGNIFICANT CHANGE UP (ref 7–23)
CALCIUM SERPL-MCNC: 9 MG/DL — SIGNIFICANT CHANGE UP (ref 8.4–10.5)
CHLORIDE SERPL-SCNC: 104 MMOL/L — SIGNIFICANT CHANGE UP (ref 96–108)
CO2 SERPL-SCNC: 24 MMOL/L — SIGNIFICANT CHANGE UP (ref 22–31)
CREAT SERPL-MCNC: 0.58 MG/DL — SIGNIFICANT CHANGE UP (ref 0.5–1.3)
GLUCOSE SERPL-MCNC: 96 MG/DL — SIGNIFICANT CHANGE UP (ref 70–99)
HCT VFR BLD CALC: 24.8 % — LOW (ref 34.5–45)
HCT VFR BLD CALC: 27.6 % — LOW (ref 34.5–45)
HGB BLD-MCNC: 8.3 G/DL — LOW (ref 11.5–15.5)
HGB BLD-MCNC: 8.8 G/DL — LOW (ref 11.5–15.5)
MAGNESIUM SERPL-MCNC: 1.7 MG/DL — SIGNIFICANT CHANGE UP (ref 1.6–2.6)
MCHC RBC-ENTMCNC: 31.9 GM/DL — LOW (ref 32–36)
MCHC RBC-ENTMCNC: 33.5 GM/DL — SIGNIFICANT CHANGE UP (ref 32–36)
MCHC RBC-ENTMCNC: 34 PG — SIGNIFICANT CHANGE UP (ref 27–34)
MCHC RBC-ENTMCNC: 34.3 PG — HIGH (ref 27–34)
MCV RBC AUTO: 102.5 FL — HIGH (ref 80–100)
MCV RBC AUTO: 106.6 FL — HIGH (ref 80–100)
NRBC # BLD: 0 /100 WBCS — SIGNIFICANT CHANGE UP (ref 0–0)
PHOSPHATE SERPL-MCNC: 4.6 MG/DL — HIGH (ref 2.5–4.5)
PLATELET # BLD AUTO: 261 K/UL — SIGNIFICANT CHANGE UP (ref 150–400)
PLATELET # BLD AUTO: 287 K/UL — SIGNIFICANT CHANGE UP (ref 150–400)
POTASSIUM SERPL-MCNC: 4 MMOL/L — SIGNIFICANT CHANGE UP (ref 3.5–5.3)
POTASSIUM SERPL-SCNC: 4 MMOL/L — SIGNIFICANT CHANGE UP (ref 3.5–5.3)
RBC # BLD: 2.42 M/UL — LOW (ref 3.8–5.2)
RBC # BLD: 2.59 M/UL — LOW (ref 3.8–5.2)
RBC # FLD: 16.1 % — HIGH (ref 10.3–14.5)
RBC # FLD: 16.6 % — HIGH (ref 10.3–14.5)
SODIUM SERPL-SCNC: 141 MMOL/L — SIGNIFICANT CHANGE UP (ref 135–145)
WBC # BLD: 13.05 K/UL — HIGH (ref 3.8–10.5)
WBC # BLD: 9.59 K/UL — SIGNIFICANT CHANGE UP (ref 3.8–10.5)
WBC # FLD AUTO: 13.05 K/UL — HIGH (ref 3.8–10.5)
WBC # FLD AUTO: 9.59 K/UL — SIGNIFICANT CHANGE UP (ref 3.8–10.5)

## 2019-11-19 PROCEDURE — 49406 IMAGE CATH FLUID PERI/RETRO: CPT

## 2019-11-19 PROCEDURE — 99231 SBSQ HOSP IP/OBS SF/LOW 25: CPT

## 2019-11-19 PROCEDURE — 99232 SBSQ HOSP IP/OBS MODERATE 35: CPT

## 2019-11-19 RX ORDER — INSULIN LISPRO 100/ML
VIAL (ML) SUBCUTANEOUS AT BEDTIME
Refills: 0 | Status: DISCONTINUED | OUTPATIENT
Start: 2019-11-19 | End: 2019-11-22

## 2019-11-19 RX ORDER — INSULIN LISPRO 100/ML
VIAL (ML) SUBCUTANEOUS EVERY 6 HOURS
Refills: 0 | Status: DISCONTINUED | OUTPATIENT
Start: 2019-11-19 | End: 2019-11-19

## 2019-11-19 RX ORDER — HEPARIN SODIUM 5000 [USP'U]/ML
800 INJECTION INTRAVENOUS; SUBCUTANEOUS
Qty: 25000 | Refills: 0 | Status: DISCONTINUED | OUTPATIENT
Start: 2019-11-19 | End: 2019-11-20

## 2019-11-19 RX ORDER — HEPARIN SODIUM 5000 [USP'U]/ML
800 INJECTION INTRAVENOUS; SUBCUTANEOUS
Qty: 25000 | Refills: 0 | Status: DISCONTINUED | OUTPATIENT
Start: 2019-11-19 | End: 2019-11-19

## 2019-11-19 RX ORDER — INSULIN LISPRO 100/ML
VIAL (ML) SUBCUTANEOUS
Refills: 0 | Status: DISCONTINUED | OUTPATIENT
Start: 2019-11-19 | End: 2019-11-22

## 2019-11-19 RX ORDER — ALTEPLASE 100 MG
2 KIT INTRAVENOUS ONCE
Refills: 0 | Status: DISCONTINUED | OUTPATIENT
Start: 2019-11-19 | End: 2019-11-22

## 2019-11-19 RX ADMIN — Medication 500 MILLIGRAM(S): at 12:43

## 2019-11-19 RX ADMIN — MONTELUKAST 10 MILLIGRAM(S): 4 TABLET, CHEWABLE ORAL at 12:43

## 2019-11-19 RX ADMIN — ERTAPENEM SODIUM 120 MILLIGRAM(S): 1 INJECTION, POWDER, LYOPHILIZED, FOR SOLUTION INTRAMUSCULAR; INTRAVENOUS at 01:27

## 2019-11-19 RX ADMIN — FAMOTIDINE 20 MILLIGRAM(S): 10 INJECTION INTRAVENOUS at 12:43

## 2019-11-19 RX ADMIN — Medication 1 MILLIGRAM(S): at 12:43

## 2019-11-19 RX ADMIN — LORATADINE 10 MILLIGRAM(S): 10 TABLET ORAL at 12:43

## 2019-11-19 NOTE — PROGRESS NOTE ADULT - SUBJECTIVE AND OBJECTIVE BOX
Follow Up:  Intraabdominal abscess    Interval History: Went for IR drain placement today. Denies significant abdominal pain today. No chills or fevers.     REVIEW OF SYSTEMS  [  ] ROS unobtainable because:    [  x] All other systems negative except as noted below    Constitutional:  [ ] fever [ ] chills  [ ] weight loss  [ ] weakness  Skin:  [ ] rash [ ] phlebitis	  Eyes: [ ] icterus [ ] pain  [ ] discharge	  ENMT: [ ] sore throat  [ ] thrush [ ] ulcers [ ] exudates  Respiratory: [ ] dyspnea [ ] hemoptysis [ ] cough [ ] sputum	  Cardiovascular:  [ ] chest pain [ ] palpitations [ ] edema	  Gastrointestinal:  [ ] nausea [ ] vomiting [ ] diarrhea [ ] constipation [ ] pain	  Genitourinary:  [ ] dysuria [ ] frequency [ ] hematuria [ ] discharge [ ] flank pain  [ ] incontinence  Musculoskeletal:  [ ] myalgias [ ] arthralgias [ ] arthritis  [ ] back pain  Neurological:  [ ] headache [ ] seizures  [ ] confusion/altered mental status    Allergies  Coreg (Other)  digoxin (Other; Short breath (Mild to Mod))  IV Contrast (Unknown)  penicillins (Hives)        ANTIMICROBIALS:  ertapenem  IVPB 1000 every 24 hours      OTHER MEDS:  MEDICATIONS  (STANDING):  ALBUTerol    90 MICROgram(s) HFA Inhaler 2 every 6 hours PRN  dextrose 40% Gel 15 once PRN  dextrose 50% Injectable 12.5 once  dextrose 50% Injectable 25 once  dextrose 50% Injectable 25 once  famotidine    Tablet 20 daily  glucagon  Injectable 1 once PRN  insulin lispro (HumaLOG) corrective regimen sliding scale  every 6 hours  loratadine 10 daily  montelukast 10 daily      Vital Signs Last 24 Hrs  T(C): 36.8 (19 Nov 2019 12:29), Max: 37 (18 Nov 2019 18:26)  T(F): 98.3 (19 Nov 2019 12:29), Max: 98.6 (18 Nov 2019 18:26)  HR: 100 (19 Nov 2019 12:29) (100 - 111)  BP: 107/71 (19 Nov 2019 12:29) (98/63 - 110/68)  BP(mean): --  RR: 17 (19 Nov 2019 12:29) (17 - 18)  SpO2: 99% (19 Nov 2019 12:29) (94% - 99%)    PHYSICAL EXAMINATION:  General: Alert and Awake, NAD  HEENT: PERRL, EOMI, No subconjunctival hemorrhages, Oropharynx Clear, MMM  Neck: Supple, No SARAHI  Cardiac: RRR, No M/R/G  Resp: CTAB, No Wh/Rh/Ra  Abdomen: +RLQ ostomy, lower midline abdominal wall defect with purulent drainage, LLQ drain with purulent drainage, new 2nd LLQ drain with serosanguinous drainage, NBS, ND, mild diffuse tenderness to palpation worst in LLQ, No HSM, No rigidity or guarding  MSK: trace to 1+ LE edema. No stigmata of IE. No evidence of phlebitis. No evidence of synovitis.  : External urinary catheter  Skin: No rashes or lesions. Skin is warm and dry to the touch.   Neuro: Alert and Awake. CN 2-12 Grossly intact. Moves all four extremities spontaneously.  Psych: Calm, Pleasant, Cooperative                          8.3    13.05 )-----------( 261      ( 19 Nov 2019 02:59 )             24.8       11-18    140  |  101  |  8   ----------------------------<  113<H>  4.0   |  25  |  0.63    Ca    9.1      18 Nov 2019 06:30  Phos  3.9     11-18  Mg     1.6     11-18            MICROBIOLOGY:  v  .Body Fluid Abdominal Fluid  11-12-19   Moderate Escherichia coli  Moderate Bacteroides thetaiotamcron group "Susceptibilities not performed"  Few Alpha hemolytic strep "Susceptibilities not performed"  --  Escherichia coli      URINE MIDSTREAM  11-08-19 --  --  --      BLOOD PERIPHERAL  11-08-19 --  --  --    RADIOLOGY:    EXAM:  CT ABDOMEN AND PELVIS IC                        PROCEDURE DATE:  11/13/2019    Diverticulosis without diverticulitis. Status post left hemicolectomy.  Persistent left ventral abdominal wall subcutaneous collection that measures up to 6.0 cm.  Enlarging left anterior abdominal wall fluid collection and new midline inferior subcutaneous collection measuring up to 5.9 cm.

## 2019-11-19 NOTE — PROGRESS NOTE ADULT - ASSESSMENT
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 WBC on admission of 11.69 with 70% PMN. CT A/P with increased size left ventral wall subcutaneous collection now measuring 4.5 cm.     IR drained small collection on 11/12. Prelim abscess cx with E coli, Bacteroides, AHS    CT A/P 11/13/19 with enlarging left anterior abdominal wall fluid collection and new midline inferior subcutaneous collection.     s/p IR drain into left sided abdominal wall collection (inferior medline subcutaneous collection appears to communicate) on 11/19    Plan for 7 days of antibiotics from drainage today.     Overall, worsening Intraabdominal Abscess, Rectal Stump Fistula, Leukocytosis, positive cx finding,     --Continue Ertapenem 1g IV Q24H (End Date: 11/25/19)  --Recommend CBC with Diff and CMP 1 week from discharge. Please have results faxed to [phone]  --Patient was advised to follow up with me in the Infectious Diseases Office in ~2 weeks time. Patient was provided with my office contact number of [phone]  --High likelihood of persistent leak, but pt not a surgical candidate at this point, will attempt source control with drainage catheter.     I will follow the patient as needed. Please feel free to contact me with any further questions or concerns.    Rex Goode M.D.  [facility] Division of Infectious Disease  8AM-5PM: Pager Number [phone]  After Hours (or if no response): Please contact the Infectious Diseases Office at [phone]

## 2019-11-19 NOTE — PROGRESS NOTE ADULT - ATTENDING COMMENTS
I have seen and evaluated the patient and discussed the relevant clinical findings and plan with the surgical housestaff and fellow.  Agree with the above documentation with addenda as noted.     Clinically stable, no complaints, being transported for IR drainage today.  Post-procedure plan for several more days of abx and then will d/c.  Restart eliquis tomorrow.    Clark Alvarado MD

## 2019-11-19 NOTE — PROGRESS NOTE ADULT - ASSESSMENT
74 year old female with a PMH of asthma, COPD, CHF, rate controlled afib, PPM, recent admission for perforated diverticulitis, s/p colostomy, hospital course complicated by abdominal abscesses discharged with BASIM drain to rehab now admitted with abdominal pain, ct of a/p showing enlarging fluid collection concerning for abscess. Now s/p IR drainage of abscess. New suture was placed to stabilize drain 11/15.     Plan:   IR drainage today  Heparin held overnight at 2AM  Restart Eliquis after IR procedure  wound care for draining wound    [facility]  [id number] 74 year old female with a PMH of asthma, COPD, CHF, rate controlled afib, PPM, recent admission for perforated diverticulitis, s/p colostomy, hospital course complicated by abdominal abscesses discharged with BASIM drain to rehab now admitted with abdominal pain, ct of a/p showing enlarging fluid collection concerning for abscess. Now s/p IR drainage of abscess. New suture was placed to stabilize drain 11/15.     Plan:   f/u EP battery change of AICD  IR drainage today  Heparin held overnight at 2AM  Restart Eliquis after IR procedure  wound care for draining wound    [facility]  [id number] 74 year old female with a PMH of asthma, COPD, CHF, rate controlled afib, PPM, recent admission for perforated diverticulitis, s/p colostomy, hospital course complicated by abdominal abscesses discharged with BASIM drain to rehab now admitted with abdominal pain, ct of a/p showing enlarging fluid collection concerning for abscess. Awaiting IR drainage.    Plan:   IR drainage today  Heparin held overnight at 2AM  Restart Eliquis after IR procedure  wound care for draining wound    [facility]  [phone]

## 2019-11-19 NOTE — PROGRESS NOTE ADULT - SUBJECTIVE AND OBJECTIVE BOX
GENERAL SURGERY PROGRESS NOTE    SUBJECTIVE  Patient seen and examined. No acute events overnight. Reports tolerating diet without nausea, vomiting, passing flatus, having bowel movements, voiding without issues, have been ambulating and out of bed. Denies fever, chills, SOB, chest pain.         10-point review of systems completed and negative except as noted above.      OBJECTIVE  General: Appears well, NAD  CHEST: breathing comfortably  CV: appears well perfused  Abdomen: soft, karmen-incisional tenderness, nondistended, no rebound or guarding. Drains L serous, R stoma +gas. aquacell on granulation tissue, purulent drainage in midline area  Extremities: Grossly symmetric    MEDICATIONS  ALBUTerol    90 MICROgram(s) HFA Inhaler 2 Puff(s) Inhalation every 6 hours PRN  ascorbic acid 500 milliGRAM(s) Oral daily  chlorhexidine 2% Cloths 1 Application(s) Topical <User Schedule>  dextrose 40% Gel 15 Gram(s) Oral once PRN  dextrose 5%. 1000 milliLiter(s) IV Continuous <Continuous>  dextrose 50% Injectable 12.5 Gram(s) IV Push once  dextrose 50% Injectable 25 Gram(s) IV Push once  dextrose 50% Injectable 25 Gram(s) IV Push once  ertapenem  IVPB 1000 milliGRAM(s) IV Intermittent every 24 hours  famotidine    Tablet 20 milliGRAM(s) Oral daily  folic acid 1 milliGRAM(s) Oral daily  glucagon  Injectable 1 milliGRAM(s) IntraMuscular once PRN  insulin lispro (HumaLOG) corrective regimen sliding scale   SubCutaneous every 6 hours  loratadine 10 milliGRAM(s) Oral daily  montelukast 10 milliGRAM(s) Oral daily  nystatin Powder 1 Application(s) Topical every 8 hours PRN  sodium chloride 0.9% lock flush 10 milliLiter(s) IV Push every 1 hour PRN      PHYSICAL EXAM  T(C): 37 (11-19-19 @ 01:58), Max: 37.3 (11-18-19 @ 05:59)  HR: 111 (11-19-19 @ 01:58) (101 - 111)  BP: 98/63 (11-19-19 @ 01:58) (98/63 - 110/68)  RR: 18 (11-19-19 @ 01:58) (17 - 18)  SpO2: 94% (11-19-19 @ 01:58) (94% - 95%)    11-17-19 @ 07:01  -  11-18-19 @ 07:00  --------------------------------------------------------  IN: 1022 mL / OUT: 115 mL / NET: 907 mL    11-18-19 @ 07:01  -  11-19-19 @ 03:43  --------------------------------------------------------  IN: 460 mL / OUT: 315 mL / NET: 145 mL        LABS                        8.3    13.05 )-----------( 261      ( 19 Nov 2019 02:59 )             24.8     11-18    140  |  101  |  8   ----------------------------<  113<H>  4.0   |  25  |  0.63    Ca    9.1      18 Nov 2019 06:30  Phos  3.9     11-18  Mg     1.6     11-18      PT/INR - ( 18 Nov 2019 10:15 )   PT: 13.0 sec;   INR: 1.14 ratio         PTT - ( 19 Nov 2019 02:59 )  PTT:32.9 sec      RADIOLOGY & ADDITIONAL STUDIES GENERAL SURGERY PROGRESS NOTE    SUBJECTIVE  Patient seen and examined. No acute events overnight. Reports tolerating diet without nausea, vomiting, passing flatus, having bowel movements, voiding without issues, have been ambulating and out of bed. Denies fever, chills, SOB, chest pain.     hep gtt held at 2am for IR procedure today  IR procedure 11/18 cancelled, EP to interrogate AICD today before IR procedure and replace battery      10-point review of systems completed and negative except as noted above.      OBJECTIVE  General: Appears well, NAD  CHEST: breathing comfortably  CV: appears well perfused  Abdomen: soft, karmen-incisional tenderness, nondistended, no rebound or guarding. Drains L serous, R stoma +gas. aquacell on granulation tissue, purulent drainage in midline area  Extremities: Grossly symmetric    MEDICATIONS  ALBUTerol    90 MICROgram(s) HFA Inhaler 2 Puff(s) Inhalation every 6 hours PRN  ascorbic acid 500 milliGRAM(s) Oral daily  chlorhexidine 2% Cloths 1 Application(s) Topical <User Schedule>  dextrose 40% Gel 15 Gram(s) Oral once PRN  dextrose 5%. 1000 milliLiter(s) IV Continuous <Continuous>  dextrose 50% Injectable 12.5 Gram(s) IV Push once  dextrose 50% Injectable 25 Gram(s) IV Push once  dextrose 50% Injectable 25 Gram(s) IV Push once  ertapenem  IVPB 1000 milliGRAM(s) IV Intermittent every 24 hours  famotidine    Tablet 20 milliGRAM(s) Oral daily  folic acid 1 milliGRAM(s) Oral daily  glucagon  Injectable 1 milliGRAM(s) IntraMuscular once PRN  insulin lispro (HumaLOG) corrective regimen sliding scale   SubCutaneous every 6 hours  loratadine 10 milliGRAM(s) Oral daily  montelukast 10 milliGRAM(s) Oral daily  nystatin Powder 1 Application(s) Topical every 8 hours PRN  sodium chloride 0.9% lock flush 10 milliLiter(s) IV Push every 1 hour PRN      PHYSICAL EXAM  T(C): 37 (11-19-19 @ 01:58), Max: 37.3 (11-18-19 @ 05:59)  HR: 111 (11-19-19 @ 01:58) (101 - 111)  BP: 98/63 (11-19-19 @ 01:58) (98/63 - 110/68)  RR: 18 (11-19-19 @ 01:58) (17 - 18)  SpO2: 94% (11-19-19 @ 01:58) (94% - 95%)    11-17-19 @ 07:01  -  11-18-19 @ 07:00  --------------------------------------------------------  IN: 1022 mL / OUT: 115 mL / NET: 907 mL    11-18-19 @ 07:01  -  11-19-19 @ 03:43  --------------------------------------------------------  IN: 460 mL / OUT: 315 mL / NET: 145 mL        LABS                        8.3    13.05 )-----------( 261      ( 19 Nov 2019 02:59 )             24.8     11-18    140  |  101  |  8   ----------------------------<  113<H>  4.0   |  25  |  0.63    Ca    9.1      18 Nov 2019 06:30  Phos  3.9     11-18  Mg     1.6     11-18      PT/INR - ( 18 Nov 2019 10:15 )   PT: 13.0 sec;   INR: 1.14 ratio         PTT - ( 19 Nov 2019 02:59 )  PTT:32.9 sec      RADIOLOGY & ADDITIONAL STUDIES GENERAL SURGERY PROGRESS NOTE    SUBJECTIVE  Patient seen and examined. No acute events overnight. Reports tolerating diet without nausea, vomiting, passing flatus, having bowel movements, voiding without issues, have been ambulating and out of bed. Denies fever, chills, SOB, chest pain.     hep gtt held at 2am for IR procedure today, PTT @2AM 32.9 subtherapeutic  IR procedure 11/18 cancelled, EP to interrogate AICD today before IR procedure and replace battery      10-point review of systems completed and negative except as noted above.      OBJECTIVE  General: Appears well, NAD  CHEST: breathing comfortably  CV: appears well perfused  Abdomen: soft, karmen-incisional tenderness, nondistended, no rebound or guarding. Drains L serous, R stoma +gas. aquacell on granulation tissue, purulent drainage in midline area  Extremities: Grossly symmetric    MEDICATIONS  ALBUTerol    90 MICROgram(s) HFA Inhaler 2 Puff(s) Inhalation every 6 hours PRN  ascorbic acid 500 milliGRAM(s) Oral daily  chlorhexidine 2% Cloths 1 Application(s) Topical <User Schedule>  dextrose 40% Gel 15 Gram(s) Oral once PRN  dextrose 5%. 1000 milliLiter(s) IV Continuous <Continuous>  dextrose 50% Injectable 12.5 Gram(s) IV Push once  dextrose 50% Injectable 25 Gram(s) IV Push once  dextrose 50% Injectable 25 Gram(s) IV Push once  ertapenem  IVPB 1000 milliGRAM(s) IV Intermittent every 24 hours  famotidine    Tablet 20 milliGRAM(s) Oral daily  folic acid 1 milliGRAM(s) Oral daily  glucagon  Injectable 1 milliGRAM(s) IntraMuscular once PRN  insulin lispro (HumaLOG) corrective regimen sliding scale   SubCutaneous every 6 hours  loratadine 10 milliGRAM(s) Oral daily  montelukast 10 milliGRAM(s) Oral daily  nystatin Powder 1 Application(s) Topical every 8 hours PRN  sodium chloride 0.9% lock flush 10 milliLiter(s) IV Push every 1 hour PRN      PHYSICAL EXAM  T(C): 37 (11-19-19 @ 01:58), Max: 37.3 (11-18-19 @ 05:59)  HR: 111 (11-19-19 @ 01:58) (101 - 111)  BP: 98/63 (11-19-19 @ 01:58) (98/63 - 110/68)  RR: 18 (11-19-19 @ 01:58) (17 - 18)  SpO2: 94% (11-19-19 @ 01:58) (94% - 95%)    11-17-19 @ 07:01  -  11-18-19 @ 07:00  --------------------------------------------------------  IN: 1022 mL / OUT: 115 mL / NET: 907 mL    11-18-19 @ 07:01  -  11-19-19 @ 03:43  --------------------------------------------------------  IN: 460 mL / OUT: 315 mL / NET: 145 mL        LABS                        8.3    13.05 )-----------( 261      ( 19 Nov 2019 02:59 )             24.8     11-18    140  |  101  |  8   ----------------------------<  113<H>  4.0   |  25  |  0.63    Ca    9.1      18 Nov 2019 06:30  Phos  3.9     11-18  Mg     1.6     11-18      PT/INR - ( 18 Nov 2019 10:15 )   PT: 13.0 sec;   INR: 1.14 ratio         PTT - ( 19 Nov 2019 02:59 )  PTT:32.9 sec      RADIOLOGY & ADDITIONAL STUDIES GENERAL SURGERY PROGRESS NOTE    SUBJECTIVE  Patient seen and examined. No acute events overnight. Denies fever, chills, SOB, chest pain.     hep gtt held at 2am for IR procedure today, PTT @2AM 32.9 subtherapeutic  Percutaneous drainage procedure 11/18 cancelled by IR, rescheduled for today.      10-point review of systems completed and negative except as noted above.      OBJECTIVE  General: Appears well, NAD  CHEST: breathing comfortably  CV: appears well perfused  Abdomen: soft, karmen-incisional tenderness, nondistended, no rebound or guarding. Drains L serous, R stoma +gas. aquacell on granulation tissue, purulent drainage in midline area  Extremities: Grossly symmetric    MEDICATIONS  ALBUTerol    90 MICROgram(s) HFA Inhaler 2 Puff(s) Inhalation every 6 hours PRN  ascorbic acid 500 milliGRAM(s) Oral daily  chlorhexidine 2% Cloths 1 Application(s) Topical <User Schedule>  dextrose 40% Gel 15 Gram(s) Oral once PRN  dextrose 5%. 1000 milliLiter(s) IV Continuous <Continuous>  dextrose 50% Injectable 12.5 Gram(s) IV Push once  dextrose 50% Injectable 25 Gram(s) IV Push once  dextrose 50% Injectable 25 Gram(s) IV Push once  ertapenem  IVPB 1000 milliGRAM(s) IV Intermittent every 24 hours  famotidine    Tablet 20 milliGRAM(s) Oral daily  folic acid 1 milliGRAM(s) Oral daily  glucagon  Injectable 1 milliGRAM(s) IntraMuscular once PRN  insulin lispro (HumaLOG) corrective regimen sliding scale   SubCutaneous every 6 hours  loratadine 10 milliGRAM(s) Oral daily  montelukast 10 milliGRAM(s) Oral daily  nystatin Powder 1 Application(s) Topical every 8 hours PRN  sodium chloride 0.9% lock flush 10 milliLiter(s) IV Push every 1 hour PRN      PHYSICAL EXAM  T(C): 37 (11-19-19 @ 01:58), Max: 37.3 (11-18-19 @ 05:59)  HR: 111 (11-19-19 @ 01:58) (101 - 111)  BP: 98/63 (11-19-19 @ 01:58) (98/63 - 110/68)  RR: 18 (11-19-19 @ 01:58) (17 - 18)  SpO2: 94% (11-19-19 @ 01:58) (94% - 95%)    11-17-19 @ 07:01  -  11-18-19 @ 07:00  --------------------------------------------------------  IN: 1022 mL / OUT: 115 mL / NET: 907 mL    11-18-19 @ 07:01  -  11-19-19 @ 03:43  --------------------------------------------------------  IN: 460 mL / OUT: 315 mL / NET: 145 mL        LABS                        8.3    13.05 )-----------( 261      ( 19 Nov 2019 02:59 )             24.8     11-18    140  |  101  |  8   ----------------------------<  113<H>  4.0   |  25  |  0.63    Ca    9.1      18 Nov 2019 06:30  Phos  3.9     11-18  Mg     1.6     11-18      PT/INR - ( 18 Nov 2019 10:15 )   PT: 13.0 sec;   INR: 1.14 ratio         PTT - ( 19 Nov 2019 02:59 )  PTT:32.9 sec      RADIOLOGY & ADDITIONAL STUDIES

## 2019-11-20 LAB
ANION GAP SERPL CALC-SCNC: 14 MMOL/L — SIGNIFICANT CHANGE UP (ref 5–17)
APTT BLD: 49.3 SEC — HIGH (ref 27.5–36.3)
BUN SERPL-MCNC: 7 MG/DL — SIGNIFICANT CHANGE UP (ref 7–23)
CALCIUM SERPL-MCNC: 8.8 MG/DL — SIGNIFICANT CHANGE UP (ref 8.4–10.5)
CHLORIDE SERPL-SCNC: 105 MMOL/L — SIGNIFICANT CHANGE UP (ref 96–108)
CO2 SERPL-SCNC: 24 MMOL/L — SIGNIFICANT CHANGE UP (ref 22–31)
CREAT SERPL-MCNC: 0.62 MG/DL — SIGNIFICANT CHANGE UP (ref 0.5–1.3)
GLUCOSE SERPL-MCNC: 98 MG/DL — SIGNIFICANT CHANGE UP (ref 70–99)
HCT VFR BLD CALC: 25.4 % — LOW (ref 34.5–45)
HGB BLD-MCNC: 8.2 G/DL — LOW (ref 11.5–15.5)
MAGNESIUM SERPL-MCNC: 1.6 MG/DL — SIGNIFICANT CHANGE UP (ref 1.6–2.6)
MCHC RBC-ENTMCNC: 32.3 GM/DL — SIGNIFICANT CHANGE UP (ref 32–36)
MCHC RBC-ENTMCNC: 34.7 PG — HIGH (ref 27–34)
MCV RBC AUTO: 107.6 FL — HIGH (ref 80–100)
PHOSPHATE SERPL-MCNC: 3.8 MG/DL — SIGNIFICANT CHANGE UP (ref 2.5–4.5)
PLATELET # BLD AUTO: 275 K/UL — SIGNIFICANT CHANGE UP (ref 150–400)
POTASSIUM SERPL-MCNC: 3.6 MMOL/L — SIGNIFICANT CHANGE UP (ref 3.5–5.3)
POTASSIUM SERPL-SCNC: 3.6 MMOL/L — SIGNIFICANT CHANGE UP (ref 3.5–5.3)
RBC # BLD: 2.36 M/UL — LOW (ref 3.8–5.2)
RBC # FLD: 15.9 % — HIGH (ref 10.3–14.5)
SODIUM SERPL-SCNC: 143 MMOL/L — SIGNIFICANT CHANGE UP (ref 135–145)
WBC # BLD: 8.05 K/UL — SIGNIFICANT CHANGE UP (ref 3.8–10.5)
WBC # FLD AUTO: 8.05 K/UL — SIGNIFICANT CHANGE UP (ref 3.8–10.5)

## 2019-11-20 PROCEDURE — 99231 SBSQ HOSP IP/OBS SF/LOW 25: CPT

## 2019-11-20 RX ORDER — POTASSIUM CHLORIDE 20 MEQ
20 PACKET (EA) ORAL ONCE
Refills: 0 | Status: COMPLETED | OUTPATIENT
Start: 2019-11-20 | End: 2019-11-20

## 2019-11-20 RX ORDER — MAGNESIUM SULFATE 500 MG/ML
2 VIAL (ML) INJECTION ONCE
Refills: 0 | Status: COMPLETED | OUTPATIENT
Start: 2019-11-20 | End: 2019-11-20

## 2019-11-20 RX ORDER — APIXABAN 2.5 MG/1
5 TABLET, FILM COATED ORAL EVERY 12 HOURS
Refills: 0 | Status: DISCONTINUED | OUTPATIENT
Start: 2019-11-20 | End: 2019-11-22

## 2019-11-20 RX ADMIN — CHLORHEXIDINE GLUCONATE 1 APPLICATION(S): 213 SOLUTION TOPICAL at 11:23

## 2019-11-20 RX ADMIN — APIXABAN 5 MILLIGRAM(S): 2.5 TABLET, FILM COATED ORAL at 11:22

## 2019-11-20 RX ADMIN — ALBUTEROL 2 PUFF(S): 90 AEROSOL, METERED ORAL at 22:27

## 2019-11-20 RX ADMIN — Medication 500 MILLIGRAM(S): at 11:31

## 2019-11-20 RX ADMIN — APIXABAN 5 MILLIGRAM(S): 2.5 TABLET, FILM COATED ORAL at 22:27

## 2019-11-20 RX ADMIN — Medication 20 MILLIEQUIVALENT(S): at 09:40

## 2019-11-20 RX ADMIN — HEPARIN SODIUM 8 UNIT(S)/HR: 5000 INJECTION INTRAVENOUS; SUBCUTANEOUS at 00:45

## 2019-11-20 RX ADMIN — Medication 50 GRAM(S): at 09:40

## 2019-11-20 RX ADMIN — Medication 100 MILLIGRAM(S): at 22:46

## 2019-11-20 RX ADMIN — FAMOTIDINE 20 MILLIGRAM(S): 10 INJECTION INTRAVENOUS at 11:22

## 2019-11-20 RX ADMIN — MONTELUKAST 10 MILLIGRAM(S): 4 TABLET, CHEWABLE ORAL at 14:07

## 2019-11-20 RX ADMIN — Medication 1 MILLIGRAM(S): at 11:22

## 2019-11-20 RX ADMIN — ALBUTEROL 2 PUFF(S): 90 AEROSOL, METERED ORAL at 06:50

## 2019-11-20 RX ADMIN — ERTAPENEM SODIUM 120 MILLIGRAM(S): 1 INJECTION, POWDER, LYOPHILIZED, FOR SOLUTION INTRAMUSCULAR; INTRAVENOUS at 03:42

## 2019-11-20 RX ADMIN — LORATADINE 10 MILLIGRAM(S): 10 TABLET ORAL at 11:22

## 2019-11-20 NOTE — PROVIDER CONTACT NOTE (OTHER) - ASSESSMENT
Patient left pigtail to BASIM suture not connected to patient's skin. Does MD Alvarado want this drain flushed? If so, how much and how often?
Pt's Heparin drip was discontinued at 08:00 am. Eliquis ordered every 12 hours. No signs of bleeding.

## 2019-11-20 NOTE — PROGRESS NOTE ADULT - SUBJECTIVE AND OBJECTIVE BOX
Interventional Radiology Follow- Up Note    74y Female with a hx of diverticulitis s/p Delgado procedure with abdominal abscesses s/p drainage on 8/16 and repositioning and upsizing on 8/21, 9/13 now admitted with abdominal pain found to have enlarging collection on CT s/p drainage on 11/19 in Interventional Radiology with Dr. Adriana Allen. Reports improvement in abdominal pain. Tolerating PO.       Vitals: T(F): 97.5 (11-20-19 @ 09:58), Max: 98.9 (11-20-19 @ 02:03)  HR: 95 (11-20-19 @ 09:58) (95 - 107)  BP: 108/69 (11-20-19 @ 09:58) (107/71 - 114/72)  RR: 18 (11-20-19 @ 09:58) (17 - 18)  SpO2: 96% (11-20-19 @ 09:58) (93% - 99%)  Wt(kg): --    LABS:                        8.8    9.59  )-----------( 287      ( 19 Nov 2019 21:09 )             27.6     11-20    143  |  105  |  7   ----------------------------<  98  3.6   |  24  |  0.62    Ca    8.8      20 Nov 2019 08:18  Phos  3.8     11-20  Mg     1.6     11-20      PT/INR - ( 18 Nov 2019 10:15 )   PT: 13.0 sec;   INR: 1.14 ratio         PTT - ( 20 Nov 2019 08:18 )  PTT:49.3 sec      PHYSICAL EXAM:  General: Nontoxic, in NAD .  Refusing PE.  abdomen: 12 [place] and 16f[place] drain in place in the LLQ. Dressing c/d/i. 45cc output documented thus far from 12 [place] drain. Serosanguinous output.   16 [place] drain with 15cc of documented output. Clear output noted.     Impression: 74y Female hx of diverticulitis s/p Delgado procedure with abdominal abscesses s/p drainage on 8/16 and repositioning and upsizing on 8/21, 9/13 now admitted with abdominal pain found to have enlarging collection on CT s/p drainage on 11/19 in Interventional Radiology with Dr. Adriana Allen.     Plan:  - IV antibiotics   - Continue to monitor out put.  - Flush drain per doctor orders.  - Trend vitals, labs.  - Follow up with IR when the output is less than 10cc. If discharged call to make appt [phone].  - Will discuss with IR attending.     Please call IR at extension [phone] or [phone] with any questions, concerns, or issues regarding above.

## 2019-11-20 NOTE — PROVIDER CONTACT NOTE (OTHER) - RECOMMENDATIONS
MD notified, resuture drain? Flush order?
As per PA Start Eliquis 2 hours after discontinuation of Heparin drip

## 2019-11-20 NOTE — PROVIDER CONTACT NOTE (OTHER) - ACTION/TREATMENT ORDERED:
MD aware. Will come to assess patient drain and site and will ask attending MD about flush necessity.  RN secured drain and dressing as well as possible. Will continue to monitor
will continue to monitor

## 2019-11-20 NOTE — PROGRESS NOTE ADULT - SUBJECTIVE AND OBJECTIVE BOX
SURGERY GREEN TEAM DAILY PROGRESS NOTE    SUBJECTIVE / 24H EVENTS  Patient seen and examined on morning rounds. No acute events overnight.  Heparin restarted at midnight  s/p IR Percutaneous drainage  PICC line cleared with alteplase      OBJECTIVE:  VITAL SIGNS:  T(C): 37.2 (11-20-19 @ 02:03), Max: 37.2 (11-20-19 @ 02:03)  HR: 100 (11-20-19 @ 02:03) (100 - 107)  BP: 111/67 (11-20-19 @ 02:03) (107/71 - 114/72)  RR: 18 (11-20-19 @ 02:03) (17 - 18)  SpO2: 95% (11-20-19 @ 02:03) (94% - 99%)  Daily Height in cm: 157.48 (19 Nov 2019 08:36)    Daily   POCT Blood Glucose.: 137 mg/dL (11-19-19 @ 21:54)  POCT Blood Glucose.: 96 mg/dL (11-19-19 @ 12:47)  POCT Blood Glucose.: 110 mg/dL (11-19-19 @ 06:47)      PHYSICAL EXAM:  General: Appears well, NAD  CHEST: breathing comfortably  CV: appears well perfused  Abdomen: soft, karmen-incisional tenderness, nondistended, no rebound or guarding. Drains L serous, R stoma +gas. aquacell on granulation tissue, purulent drainage in midline area  Extremities: Grossly symmetric      11-18-19 @ 07:01  -  11-19-19 @ 07:00  --------------------------------------------------------  IN:    Oral Fluid: 360 mL    Solution: 100 mL  Total IN: 460 mL    OUT:    Bulb: 15 mL    Colostomy: 100 mL    Drain: 65 mL    Voided: 450 mL  Total OUT: 630 mL    Total NET: -170 mL      11-19-19 @ 07:01  -  11-20-19 @ 03:12  --------------------------------------------------------  IN:  Total IN: 0 mL    OUT:    Bulb: 20 mL    Bulb: 35 mL    Drain: 20 mL  Total OUT: 75 mL    Total NET: -75 mL          LAB VALUES:  11-19    141  |  104  |  7   ----------------------------<  96  4.0   |  24  |  0.58    Ca    9.0      19 Nov 2019 15:55  Phos  4.6     11-19  Mg     1.7     11-19                                 8.8    9.59  )-----------( 287      ( 19 Nov 2019 21:09 )             27.6       PT/INR - ( 18 Nov 2019 10:15 )   PT: 13.0 sec;   INR: 1.14 ratio         PTT - ( 19 Nov 2019 02:59 )  PTT:32.9 sec            MICROBIOLOGY:      RADIOLOGY:  PACS Image: Image(s) Available (11-19-19 @ 09:32)        MEDICATIONS  (STANDING):  alteplase for catheter clearance 2 milliGRAM(s) Catheter once  ascorbic acid 500 milliGRAM(s) Oral daily  chlorhexidine 2% Cloths 1 Application(s) Topical <User Schedule>  dextrose 5%. 1000 milliLiter(s) (50 mL/Hr) IV Continuous <Continuous>  dextrose 50% Injectable 12.5 Gram(s) IV Push once  dextrose 50% Injectable 25 Gram(s) IV Push once  dextrose 50% Injectable 25 Gram(s) IV Push once  ertapenem  IVPB 1000 milliGRAM(s) IV Intermittent every 24 hours  famotidine    Tablet 20 milliGRAM(s) Oral daily  folic acid 1 milliGRAM(s) Oral daily  heparin  Infusion 800 Unit(s)/Hr (8 mL/Hr) IV Continuous <Continuous>  insulin lispro (HumaLOG) corrective regimen sliding scale   SubCutaneous three times a day before meals  insulin lispro (HumaLOG) corrective regimen sliding scale   SubCutaneous at bedtime  loratadine 10 milliGRAM(s) Oral daily  montelukast 10 milliGRAM(s) Oral daily    MEDICATIONS  (PRN):  ALBUTerol    90 MICROgram(s) HFA Inhaler 2 Puff(s) Inhalation every 6 hours PRN Shortness of Breath and/or Wheezing  dextrose 40% Gel 15 Gram(s) Oral once PRN Blood Glucose LESS THAN 70 milliGRAM(s)/deciLiter  glucagon  Injectable 1 milliGRAM(s) IntraMuscular once PRN Glucose <70 milliGRAM(s)/deciLiter  nystatin Powder 1 Application(s) Topical every 8 hours PRN Rash  sodium chloride 0.9% lock flush 10 milliLiter(s) IV Push every 1 hour PRN Pre/post blood products, medications, blood draw, and to maintain line patency

## 2019-11-20 NOTE — PROGRESS NOTE ADULT - ASSESSMENT
ASSESSMENT:  74 year old female with a PMH of asthma, COPD, CHF, rate controlled afib, PPM, recent admission for perforated diverticulitis, s/p colostomy, hospital course complicated by abdominal abscesses discharged with BASIM drain to rehab now admitted with abdominal pain, ct of a/p showing enlarging fluid collection concerning for abscess. Awaiting IR drainage.    PLAN:  - Appreciate ID recommendations: Continue Ertapenem 1g IV Q24H (End Date: 11/25/19)  - s/p IR drainage today  - Heparin restarted  - Restart Eliquis  - Wound care for draining wound    Green Surgery, [id number]

## 2019-11-21 LAB
ANION GAP SERPL CALC-SCNC: 12 MMOL/L — SIGNIFICANT CHANGE UP (ref 5–17)
BUN SERPL-MCNC: 9 MG/DL — SIGNIFICANT CHANGE UP (ref 7–23)
CALCIUM SERPL-MCNC: 8.8 MG/DL — SIGNIFICANT CHANGE UP (ref 8.4–10.5)
CHLORIDE SERPL-SCNC: 104 MMOL/L — SIGNIFICANT CHANGE UP (ref 96–108)
CO2 SERPL-SCNC: 25 MMOL/L — SIGNIFICANT CHANGE UP (ref 22–31)
CREAT SERPL-MCNC: 0.57 MG/DL — SIGNIFICANT CHANGE UP (ref 0.5–1.3)
GLUCOSE SERPL-MCNC: 116 MG/DL — HIGH (ref 70–99)
HCT VFR BLD CALC: 25.2 % — LOW (ref 34.5–45)
HGB BLD-MCNC: 8.1 G/DL — LOW (ref 11.5–15.5)
MAGNESIUM SERPL-MCNC: 1.7 MG/DL — SIGNIFICANT CHANGE UP (ref 1.6–2.6)
MCHC RBC-ENTMCNC: 32.1 GM/DL — SIGNIFICANT CHANGE UP (ref 32–36)
MCHC RBC-ENTMCNC: 34.5 PG — HIGH (ref 27–34)
MCV RBC AUTO: 107.2 FL — HIGH (ref 80–100)
PHOSPHATE SERPL-MCNC: 3.5 MG/DL — SIGNIFICANT CHANGE UP (ref 2.5–4.5)
PLATELET # BLD AUTO: 261 K/UL — SIGNIFICANT CHANGE UP (ref 150–400)
POTASSIUM SERPL-MCNC: 3.8 MMOL/L — SIGNIFICANT CHANGE UP (ref 3.5–5.3)
POTASSIUM SERPL-SCNC: 3.8 MMOL/L — SIGNIFICANT CHANGE UP (ref 3.5–5.3)
RBC # BLD: 2.35 M/UL — LOW (ref 3.8–5.2)
RBC # FLD: 16.3 % — HIGH (ref 10.3–14.5)
SODIUM SERPL-SCNC: 141 MMOL/L — SIGNIFICANT CHANGE UP (ref 135–145)
WBC # BLD: 9.71 K/UL — SIGNIFICANT CHANGE UP (ref 3.8–10.5)
WBC # FLD AUTO: 9.71 K/UL — SIGNIFICANT CHANGE UP (ref 3.8–10.5)

## 2019-11-21 PROCEDURE — 99231 SBSQ HOSP IP/OBS SF/LOW 25: CPT

## 2019-11-21 PROCEDURE — 99232 SBSQ HOSP IP/OBS MODERATE 35: CPT

## 2019-11-21 RX ORDER — MAGNESIUM SULFATE 500 MG/ML
1 VIAL (ML) INJECTION ONCE
Refills: 0 | Status: COMPLETED | OUTPATIENT
Start: 2019-11-21 | End: 2019-11-21

## 2019-11-21 RX ORDER — POTASSIUM CHLORIDE 20 MEQ
40 PACKET (EA) ORAL ONCE
Refills: 0 | Status: COMPLETED | OUTPATIENT
Start: 2019-11-21 | End: 2019-11-21

## 2019-11-21 RX ORDER — ACETAMINOPHEN 500 MG
650 TABLET ORAL EVERY 6 HOURS
Refills: 0 | Status: DISCONTINUED | OUTPATIENT
Start: 2019-11-21 | End: 2019-11-22

## 2019-11-21 RX ADMIN — MONTELUKAST 10 MILLIGRAM(S): 4 TABLET, CHEWABLE ORAL at 11:32

## 2019-11-21 RX ADMIN — Medication 100 MILLIGRAM(S): at 21:35

## 2019-11-21 RX ADMIN — CHLORHEXIDINE GLUCONATE 1 APPLICATION(S): 213 SOLUTION TOPICAL at 10:20

## 2019-11-21 RX ADMIN — Medication 650 MILLIGRAM(S): at 21:36

## 2019-11-21 RX ADMIN — LORATADINE 10 MILLIGRAM(S): 10 TABLET ORAL at 11:31

## 2019-11-21 RX ADMIN — ALBUTEROL 2 PUFF(S): 90 AEROSOL, METERED ORAL at 21:39

## 2019-11-21 RX ADMIN — FAMOTIDINE 20 MILLIGRAM(S): 10 INJECTION INTRAVENOUS at 11:31

## 2019-11-21 RX ADMIN — Medication 1: at 13:53

## 2019-11-21 RX ADMIN — Medication 40 MILLIEQUIVALENT(S): at 11:31

## 2019-11-21 RX ADMIN — Medication 650 MILLIGRAM(S): at 22:06

## 2019-11-21 RX ADMIN — ERTAPENEM SODIUM 120 MILLIGRAM(S): 1 INJECTION, POWDER, LYOPHILIZED, FOR SOLUTION INTRAMUSCULAR; INTRAVENOUS at 23:49

## 2019-11-21 RX ADMIN — ERTAPENEM SODIUM 120 MILLIGRAM(S): 1 INJECTION, POWDER, LYOPHILIZED, FOR SOLUTION INTRAMUSCULAR; INTRAVENOUS at 00:36

## 2019-11-21 RX ADMIN — APIXABAN 5 MILLIGRAM(S): 2.5 TABLET, FILM COATED ORAL at 11:32

## 2019-11-21 RX ADMIN — Medication 1 MILLIGRAM(S): at 11:32

## 2019-11-21 RX ADMIN — APIXABAN 5 MILLIGRAM(S): 2.5 TABLET, FILM COATED ORAL at 23:56

## 2019-11-21 RX ADMIN — Medication 100 GRAM(S): at 12:30

## 2019-11-21 RX ADMIN — Medication 500 MILLIGRAM(S): at 11:32

## 2019-11-21 NOTE — PROGRESS NOTE ADULT - SUBJECTIVE AND OBJECTIVE BOX
Follow Up:  Intraabdominal abscess    Interval History: no chill or fevers. Notes some continued left sided abdominal pain.     REVIEW OF SYSTEMS  [  ] ROS unobtainable because:    [ x ] All other systems negative except as noted below    Constitutional:  [ ] fever [ ] chills  [ ] weight loss  [ ] weakness  Skin:  [ ] rash [ ] phlebitis	  Eyes: [ ] icterus [ ] pain  [ ] discharge	  ENMT: [ ] sore throat  [ ] thrush [ ] ulcers [ ] exudates  Respiratory: [ ] dyspnea [ ] hemoptysis [ ] cough [ ] sputum	  Cardiovascular:  [ ] chest pain [ ] palpitations [ ] edema	  Gastrointestinal:  [ ] nausea [ ] vomiting [ ] diarrhea [ ] constipation [x ] pain	  Genitourinary:  [ ] dysuria [ ] frequency [ ] hematuria [ ] discharge [ ] flank pain  [ ] incontinence  Musculoskeletal:  [ ] myalgias [ ] arthralgias [ ] arthritis  [ ] back pain  Neurological:  [ ] headache [ ] seizures  [ ] confusion/altered mental status    Allergies  Coreg (Other)  digoxin (Other; Short breath (Mild to Mod))  IV Contrast (Unknown)  penicillins (Hives)        ANTIMICROBIALS:  ertapenem  IVPB 1000 every 24 hours      OTHER MEDS:  MEDICATIONS  (STANDING):  acetaminophen   Tablet .. 650 every 6 hours PRN  ALBUTerol    90 MICROgram(s) HFA Inhaler 2 every 6 hours PRN  alteplase for catheter clearance 2 once  apixaban 5 every 12 hours  dextrose 40% Gel 15 once PRN  dextrose 50% Injectable 12.5 once  dextrose 50% Injectable 25 once  dextrose 50% Injectable 25 once  famotidine    Tablet 20 daily  glucagon  Injectable 1 once PRN  guaiFENesin   Syrup  (Sugar-Free) 100 every 6 hours PRN  insulin lispro (HumaLOG) corrective regimen sliding scale  three times a day before meals  insulin lispro (HumaLOG) corrective regimen sliding scale  at bedtime  loratadine 10 daily  montelukast 10 daily      Vital Signs Last 24 Hrs  T(C): 37 (21 Nov 2019 09:39), Max: 37 (21 Nov 2019 09:39)  T(F): 98.6 (21 Nov 2019 09:39), Max: 98.6 (21 Nov 2019 09:39)  HR: 102 (21 Nov 2019 09:39) (93 - 102)  BP: 107/58 (21 Nov 2019 09:39) (101/62 - 115/68)  BP(mean): --  RR: 18 (21 Nov 2019 09:39) (17 - 18)  SpO2: 93% (21 Nov 2019 09:39) (93% - 97%)    PHYSICAL EXAMINATION:  General: Alert and Awake, NAD  HEENT: PERRL, EOMI, No subconjunctival hemorrhages, Oropharynx Clear, MMM  Neck: Supple, No SARAHI  Cardiac: RRR, No M/R/G  Resp: CTAB, No Wh/Rh/Ra  Abdomen: +RLQ ostomy, lower midline abdominal wall defect with purulent drainage, LLQ drain with purulent drainage, new 2nd LLQ drain with serosanguinous drainage, NBS, ND, mild diffuse tenderness to palpation worst in LLQ, No HSM, No rigidity or guarding  MSK: trace to 1+ LE edema. No stigmata of IE. No evidence of phlebitis. No evidence of synovitis.  : External urinary catheter  Skin: No rashes or lesions. Skin is warm and dry to the touch.   Neuro: Alert and Awake. CN 2-12 Grossly intact. Moves all four extremities spontaneously.  Psych: Calm, Pleasant, Cooperative                        8.1    9.71  )-----------( 261      ( 21 Nov 2019 08:54 )             25.2       11-21    141  |  104  |  9   ----------------------------<  116<H>  3.8   |  25  |  0.57    Ca    8.8      21 Nov 2019 07:07  Phos  3.5     11-21  Mg     1.7     11-21            MICROBIOLOGY:  v  .Body Fluid Abdominal Fluid  11-12-19   Moderate Escherichia coli  Moderate Bacteroides thetaiotamcron group "Susceptibilities not performed"  Few Alpha hemolytic strep "Susceptibilities not performed"  --  Escherichia coli      URINE MIDSTREAM  11-08-19 --  --  --      BLOOD PERIPHERAL  11-08-19 --  --  --      RADIOLOGY:    EXAM:  CT ABDOMEN AND PELVIS IC                        PROCEDURE DATE:  11/13/2019    Diverticulosis without diverticulitis. Status post left hemicolectomy.  Persistent left ventral abdominal wall subcutaneous collection that measures up to 6.0 cm.  Enlarging left anterior abdominal wall fluid collection and new midline inferior subcutaneous collection measuring up to 5.9 cm.

## 2019-11-21 NOTE — PROGRESS NOTE ADULT - ASSESSMENT
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 WBC on admission of 11.69 with 70% PMN. CT A/P with increased size left ventral wall subcutaneous collection now measuring 4.5 cm.     IR drained small collection on 11/12. Prelim abscess cx with E coli, Bacteroides, AHS    CT A/P 11/13/19 with enlarging left anterior abdominal wall fluid collection and new midline inferior subcutaneous collection.     s/p IR drain into left sided abdominal wall collection (inferior medline subcutaneous collection appears to communicate) on 11/19    Plan for 7 days of antibiotics from drainage.     Overall, worsening Intraabdominal Abscess, Rectal Stump Fistula, Leukocytosis, positive cx finding,     --Continue Ertapenem 1g IV Q24H (End Date: 11/25/19)  --Recommend CBC with Diff and CMP 1 week from discharge. Please have results faxed to [phone]  --Patient was advised to follow up with me in the Infectious Diseases Office in ~2 weeks time. Patient was provided with my office contact number of [phone]  --High likelihood of persistent leak, but pt not a surgical candidate at this point, will attempt source control with drainage catheter.     I will sign off at this time. Please feel free to contact me with any further questions or concerns.    Rex Goode M.D.  [facility] Division of Infectious Disease  8AM-5PM: Pager Number [phone]  After Hours (or if no response): Please contact the Infectious Diseases Office at [phone]

## 2019-11-21 NOTE — PROGRESS NOTE ADULT - SUBJECTIVE AND OBJECTIVE BOX
SURGERY GREEN TEAM DAILY PROGRESS NOTE    SUBJECTIVE / 24H EVENTS  Patient seen and examined on morning rounds. No acute events overnight.  Heparin stopped and Eliquis started      PHYSICAL EXAM:  General: Appears well, NAD  CHEST: breathing comfortably  CV: appears well perfused  Abdomen: soft, karmen-incisional tenderness, nondistended, no rebound or guarding. Drains L serous, R stoma +gas. aquacell on granulation tissue, purulent drainage in midline area  Extremities: Grossly symmetric    Vital Signs:  Vital Signs Last 24 Hrs  T(C): 36.8 (21 Nov 2019 01:07), Max: 36.9 (20 Nov 2019 05:17)  T(F): 98.3 (21 Nov 2019 01:07), Max: 98.5 (20 Nov 2019 05:17)  HR: 100 (21 Nov 2019 01:07) (93 - 102)  BP: 105/67 (21 Nov 2019 01:07) (101/62 - 115/68)  BP(mean): --  RR: 18 (21 Nov 2019 01:07) (17 - 18)  SpO2: 95% (21 Nov 2019 01:07) (93% - 97%)    CAPILLARY BLOOD GLUCOSE      POCT Blood Glucose.: 135 mg/dL (20 Nov 2019 22:14)  POCT Blood Glucose.: 149 mg/dL (20 Nov 2019 18:13)  POCT Blood Glucose.: 118 mg/dL (20 Nov 2019 13:37)  POCT Blood Glucose.: 98 mg/dL (20 Nov 2019 08:37)      I&O's Detail    19 Nov 2019 07:01  -  20 Nov 2019 07:00  --------------------------------------------------------  IN:  Total IN: 0 mL    OUT:    Bulb: 45 mL    Bulb: 25 mL    Colostomy: 30 mL    Drain: 20 mL  Total OUT: 120 mL    Total NET: -120 mL      20 Nov 2019 07:01  -  21 Nov 2019 02:37  --------------------------------------------------------  IN:    Enteral Tube Flush: 10 mL    Enteral Tube Flush: 5 mL    Oral Fluid: 680 mL    Solution: 50 mL    Solution: 50 mL  Total IN: 795 mL    OUT:    Bulb: 55 mL    Colostomy: 100 mL    Voided: 500 mL  Total OUT: 655 mL    Total NET: 140 mL          MEDICATIONS  (STANDING):  alteplase for catheter clearance 2 milliGRAM(s) Catheter once  apixaban 5 milliGRAM(s) Oral every 12 hours  ascorbic acid 500 milliGRAM(s) Oral daily  chlorhexidine 2% Cloths 1 Application(s) Topical <User Schedule>  dextrose 5%. 1000 milliLiter(s) (50 mL/Hr) IV Continuous <Continuous>  dextrose 50% Injectable 12.5 Gram(s) IV Push once  dextrose 50% Injectable 25 Gram(s) IV Push once  dextrose 50% Injectable 25 Gram(s) IV Push once  ertapenem  IVPB 1000 milliGRAM(s) IV Intermittent every 24 hours  famotidine    Tablet 20 milliGRAM(s) Oral daily  folic acid 1 milliGRAM(s) Oral daily  insulin lispro (HumaLOG) corrective regimen sliding scale   SubCutaneous three times a day before meals  insulin lispro (HumaLOG) corrective regimen sliding scale   SubCutaneous at bedtime  loratadine 10 milliGRAM(s) Oral daily  montelukast 10 milliGRAM(s) Oral daily    MEDICATIONS  (PRN):  ALBUTerol    90 MICROgram(s) HFA Inhaler 2 Puff(s) Inhalation every 6 hours PRN Shortness of Breath and/or Wheezing  dextrose 40% Gel 15 Gram(s) Oral once PRN Blood Glucose LESS THAN 70 milliGRAM(s)/deciLiter  glucagon  Injectable 1 milliGRAM(s) IntraMuscular once PRN Glucose <70 milliGRAM(s)/deciLiter  guaiFENesin   Syrup  (Sugar-Free) 100 milliGRAM(s) Oral every 6 hours PRN Cough  nystatin Powder 1 Application(s) Topical every 8 hours PRN Rash  sodium chloride 0.9% lock flush 10 milliLiter(s) IV Push every 1 hour PRN Pre/post blood products, medications, blood draw, and to maintain line patency          Labs:    11-20    143  |  105  |  7   ----------------------------<  98  3.6   |  24  |  0.62    Ca    8.8      20 Nov 2019 08:18  Phos  3.8     11-20  Mg     1.6     11-20                              8.2    8.05  )-----------( 275      ( 20 Nov 2019 10:22 )             25.4     PTT - ( 20 Nov 2019 08:18 )  PTT:49.3 sec    Imaging: SURGERY GREEN TEAM DAILY PROGRESS NOTE    SUBJECTIVE / 24H EVENTS  Patient seen and examined on morning rounds. No acute events overnight.  Heparin stopped and Eliquis started      PHYSICAL EXAM:  General: Appears well, NAD  CHEST: breathing comfortably  CV: appears well perfused  Abdomen: soft, karmen-incisional tenderness, nondistended, no rebound or guarding. R stoma +gas. aquacell on granulation tissue, BASIM purulent  Extremities: Grossly symmetric    Vital Signs:  Vital Signs Last 24 Hrs  T(C): 36.8 (21 Nov 2019 01:07), Max: 36.9 (20 Nov 2019 05:17)  T(F): 98.3 (21 Nov 2019 01:07), Max: 98.5 (20 Nov 2019 05:17)  HR: 100 (21 Nov 2019 01:07) (93 - 102)  BP: 105/67 (21 Nov 2019 01:07) (101/62 - 115/68)  BP(mean): --  RR: 18 (21 Nov 2019 01:07) (17 - 18)  SpO2: 95% (21 Nov 2019 01:07) (93% - 97%)    CAPILLARY BLOOD GLUCOSE      POCT Blood Glucose.: 135 mg/dL (20 Nov 2019 22:14)  POCT Blood Glucose.: 149 mg/dL (20 Nov 2019 18:13)  POCT Blood Glucose.: 118 mg/dL (20 Nov 2019 13:37)  POCT Blood Glucose.: 98 mg/dL (20 Nov 2019 08:37)      I&O's Detail    19 Nov 2019 07:01  -  20 Nov 2019 07:00  --------------------------------------------------------  IN:  Total IN: 0 mL    OUT:    Bulb: 45 mL    Bulb: 25 mL    Colostomy: 30 mL    Drain: 20 mL  Total OUT: 120 mL    Total NET: -120 mL      20 Nov 2019 07:01  -  21 Nov 2019 02:37  --------------------------------------------------------  IN:    Enteral Tube Flush: 10 mL    Enteral Tube Flush: 5 mL    Oral Fluid: 680 mL    Solution: 50 mL    Solution: 50 mL  Total IN: 795 mL    OUT:    Bulb: 55 mL    Colostomy: 100 mL    Voided: 500 mL  Total OUT: 655 mL    Total NET: 140 mL          MEDICATIONS  (STANDING):  alteplase for catheter clearance 2 milliGRAM(s) Catheter once  apixaban 5 milliGRAM(s) Oral every 12 hours  ascorbic acid 500 milliGRAM(s) Oral daily  chlorhexidine 2% Cloths 1 Application(s) Topical <User Schedule>  dextrose 5%. 1000 milliLiter(s) (50 mL/Hr) IV Continuous <Continuous>  dextrose 50% Injectable 12.5 Gram(s) IV Push once  dextrose 50% Injectable 25 Gram(s) IV Push once  dextrose 50% Injectable 25 Gram(s) IV Push once  ertapenem  IVPB 1000 milliGRAM(s) IV Intermittent every 24 hours  famotidine    Tablet 20 milliGRAM(s) Oral daily  folic acid 1 milliGRAM(s) Oral daily  insulin lispro (HumaLOG) corrective regimen sliding scale   SubCutaneous three times a day before meals  insulin lispro (HumaLOG) corrective regimen sliding scale   SubCutaneous at bedtime  loratadine 10 milliGRAM(s) Oral daily  montelukast 10 milliGRAM(s) Oral daily    MEDICATIONS  (PRN):  ALBUTerol    90 MICROgram(s) HFA Inhaler 2 Puff(s) Inhalation every 6 hours PRN Shortness of Breath and/or Wheezing  dextrose 40% Gel 15 Gram(s) Oral once PRN Blood Glucose LESS THAN 70 milliGRAM(s)/deciLiter  glucagon  Injectable 1 milliGRAM(s) IntraMuscular once PRN Glucose <70 milliGRAM(s)/deciLiter  guaiFENesin   Syrup  (Sugar-Free) 100 milliGRAM(s) Oral every 6 hours PRN Cough  nystatin Powder 1 Application(s) Topical every 8 hours PRN Rash  sodium chloride 0.9% lock flush 10 milliLiter(s) IV Push every 1 hour PRN Pre/post blood products, medications, blood draw, and to maintain line patency          Labs:    11-20    143  |  105  |  7   ----------------------------<  98  3.6   |  24  |  0.62    Ca    8.8      20 Nov 2019 08:18  Phos  3.8     11-20  Mg     1.6     11-20                              8.2    8.05  )-----------( 275      ( 20 Nov 2019 10:22 )             25.4     PTT - ( 20 Nov 2019 08:18 )  PTT:49.3 sec    Imaging:

## 2019-11-21 NOTE — PROGRESS NOTE ADULT - ASSESSMENT
ASSESSMENT:  74 year old female with a PMH of asthma, COPD, CHF, rate controlled afib, PPM, recent admission for perforated diverticulitis, s/p colostomy, hospital course complicated by abdominal abscesses discharged with BASIM drain to rehab now admitted with abdominal pain, ct of a/p showing enlarging fluid collection concerning for abscess. s/p IR drainage.    PLAN:  - Appreciate ID recommendations: Continue Ertapenem 1g IV Q24H (End Date: 11/25/19)  - s/p IR drainage   - eliquis  - Restart Eliquis  - Wound care for draining wound  - Rehab auth pending  - Tube flushes every other day    Green Surgery, [id number]

## 2019-11-21 NOTE — PROGRESS NOTE ADULT - SUBJECTIVE AND OBJECTIVE BOX
Interventional Radiology Follow- Up Note      This is a 74y Female s/p LLQ abscess drainage catheter placement on 11/19/19 in Interventional Radiology with Dr. De.    Patient seen and examined @ bedside. Reports mild discomfort at drain sites, otherwise offers no complaints. Denies abdominal pain, nausea, vomiting.     PAST MEDICAL & SURGICAL HISTORY:  Refusal of blood transfusions as patient is Advent  Patient is Advent  Vertigo  Atrial flutter  Diverticulitis  Cardiomyopathy  Kidney stone  Cardiac Pacemaker  HTN - Hypertension  Gout  Diabetes  Congestive Heart Failure  Asthma  Status post left hemicolectomy  S/P cholecystectomy  AICD (Automatic Cardioverter/Defibrillator) Present: inserted in Aug, 2008. Due for battery change in 1 month. ( [employer]) . Inserted by Dr Duffy    Allergies: Coreg (Other)  digoxin (Other; Short breath (Mild to Mod))  IV Contrast (Unknown)  penicillins (Hives)    Intolerances: metoprolol (Other)  Solu-Medrol (Other (Mild to Mod))      LABS:                        8.1    9.71  )-----------( 261      ( 21 Nov 2019 08:54 )             25.2     11-21    141  |  104  |  9   ----------------------------<  116<H>  3.8   |  25  |  0.57    Ca    8.8      21 Nov 2019 07:07  Phos  3.5     11-21  Mg     1.7     11-21      PTT - ( 20 Nov 2019 08:18 )  PTT:49.3 sec    Vitals: T(F): 98.5 (11-21-19 @ 14:19), Max: 98.6 (11-21-19 @ 09:39)  HR: 100 (11-21-19 @ 14:19) (100 - 102)  BP: 110/62 (11-21-19 @ 14:19) (101/62 - 110/62)  RR: 18 (11-21-19 @ 14:19) (17 - 18)  SpO2: 94% (11-21-19 @ 14:19) (93% - 96%)    PHYSICAL EXAM:  General: Nontoxic, in NAD, Awake and alert  Abd: obese, soft, mildly TTP over drain sites. More lateral Pigtail (16Fr) to BASIM with scant output in bulb, no output documented over last 24hr. More medial pigtail to BASIM (12Fr) with 25cc of purulent output, 35cc documented over last 24hr.     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 De.   -continue to monitor output    -flush drain per doctor orders  -Continue abx therapy per ID recs  -trend vitals, labs  -Please call IR at extension [phone] with any questions, concerns, or issues regarding above.

## 2019-11-22 ENCOUNTER — TRANSCRIPTION ENCOUNTER (OUTPATIENT)
Age: 74
End: 2019-11-22

## 2019-11-22 VITALS
TEMPERATURE: 98 F | SYSTOLIC BLOOD PRESSURE: 109 MMHG | OXYGEN SATURATION: 95 % | DIASTOLIC BLOOD PRESSURE: 64 MMHG | HEART RATE: 99 BPM | RESPIRATION RATE: 18 BRPM

## 2019-11-22 PROCEDURE — 87070 CULTURE OTHR SPECIMN AEROBIC: CPT

## 2019-11-22 PROCEDURE — C1887: CPT

## 2019-11-22 PROCEDURE — 80202 ASSAY OF VANCOMYCIN: CPT

## 2019-11-22 PROCEDURE — 99231 SBSQ HOSP IP/OBS SF/LOW 25: CPT

## 2019-11-22 PROCEDURE — 97116 GAIT TRAINING THERAPY: CPT

## 2019-11-22 PROCEDURE — C1769: CPT

## 2019-11-22 PROCEDURE — 84100 ASSAY OF PHOSPHORUS: CPT

## 2019-11-22 PROCEDURE — 85610 PROTHROMBIN TIME: CPT

## 2019-11-22 PROCEDURE — 97530 THERAPEUTIC ACTIVITIES: CPT

## 2019-11-22 PROCEDURE — 85027 COMPLETE CBC AUTOMATED: CPT

## 2019-11-22 PROCEDURE — 74177 CT ABD & PELVIS W/CONTRAST: CPT

## 2019-11-22 PROCEDURE — 87186 SC STD MICRODIL/AGAR DIL: CPT

## 2019-11-22 PROCEDURE — 71045 X-RAY EXAM CHEST 1 VIEW: CPT

## 2019-11-22 PROCEDURE — 80048 BASIC METABOLIC PNL TOTAL CA: CPT

## 2019-11-22 PROCEDURE — 87075 CULTR BACTERIA EXCEPT BLOOD: CPT

## 2019-11-22 PROCEDURE — 49406 IMAGE CATH FLUID PERI/RETRO: CPT

## 2019-11-22 PROCEDURE — 87205 SMEAR GRAM STAIN: CPT

## 2019-11-22 PROCEDURE — 94640 AIRWAY INHALATION TREATMENT: CPT

## 2019-11-22 PROCEDURE — 85730 THROMBOPLASTIN TIME PARTIAL: CPT

## 2019-11-22 PROCEDURE — 83735 ASSAY OF MAGNESIUM: CPT

## 2019-11-22 PROCEDURE — 82962 GLUCOSE BLOOD TEST: CPT

## 2019-11-22 PROCEDURE — 97161 PT EVAL LOW COMPLEX 20 MIN: CPT

## 2019-11-22 PROCEDURE — C1729: CPT

## 2019-11-22 PROCEDURE — 97110 THERAPEUTIC EXERCISES: CPT

## 2019-11-22 RX ORDER — FUROSEMIDE 40 MG
1 TABLET ORAL
Qty: 0 | Refills: 0 | DISCHARGE
Start: 2019-11-22

## 2019-11-22 RX ORDER — ALBUTEROL 90 UG/1
2 AEROSOL, METERED ORAL
Qty: 0 | Refills: 0 | DISCHARGE
Start: 2019-11-22

## 2019-11-22 RX ORDER — NYSTATIN CREAM 100000 [USP'U]/G
1 CREAM TOPICAL
Qty: 0 | Refills: 0 | DISCHARGE
Start: 2019-11-22

## 2019-11-22 RX ORDER — LORATADINE 10 MG/1
1 TABLET ORAL
Qty: 0 | Refills: 0 | DISCHARGE

## 2019-11-22 RX ORDER — APIXABAN 2.5 MG/1
1 TABLET, FILM COATED ORAL
Qty: 0 | Refills: 0 | DISCHARGE
Start: 2019-11-22

## 2019-11-22 RX ORDER — ERTAPENEM SODIUM 1 G/1
1000 INJECTION, POWDER, LYOPHILIZED, FOR SOLUTION INTRAMUSCULAR; INTRAVENOUS
Qty: 0 | Refills: 0 | DISCHARGE
Start: 2019-11-22

## 2019-11-22 RX ADMIN — FAMOTIDINE 20 MILLIGRAM(S): 10 INJECTION INTRAVENOUS at 11:38

## 2019-11-22 RX ADMIN — Medication 500 MILLIGRAM(S): at 11:38

## 2019-11-22 RX ADMIN — MONTELUKAST 10 MILLIGRAM(S): 4 TABLET, CHEWABLE ORAL at 11:39

## 2019-11-22 RX ADMIN — CHLORHEXIDINE GLUCONATE 1 APPLICATION(S): 213 SOLUTION TOPICAL at 10:27

## 2019-11-22 RX ADMIN — LORATADINE 10 MILLIGRAM(S): 10 TABLET ORAL at 11:39

## 2019-11-22 RX ADMIN — Medication 1 MILLIGRAM(S): at 11:38

## 2019-11-22 RX ADMIN — APIXABAN 5 MILLIGRAM(S): 2.5 TABLET, FILM COATED ORAL at 11:37

## 2019-11-22 NOTE — DISCHARGE NOTE NURSING/CASE MANAGEMENT/SOCIAL WORK - NSDCFUADDAPPT_GEN_ALL_CORE_FT
Please follow up with Dr. Goode Infectious Disease out patient in 2 weeks  Please call and make an appointment to follow up with IR when BASIM drain output is less than 10cc [phone]  Follow up with Primary Care Physician in 1-2 weeks after discharge

## 2019-11-22 NOTE — PROGRESS NOTE ADULT - SUBJECTIVE AND OBJECTIVE BOX
SURGERY GREEN TEAM DAILY PROGRESS NOTE    SUBJECTIVE / 24H EVENTS  Patient seen and examined on morning rounds. No acute events overnight. Denies n/v. Brittany diet.  Eliquis started.       PHYSICAL EXAM:  General: Appears well, NAD  CHEST: breathing comfortably  CV: appears well perfused  Abdomen: soft, karmen-incisional tenderness, nondistended, no rebound or guarding. R stoma +gas. aquacell on granulation tissue, BASIM purulent  Extremities: Grossly symmetric      Vital Signs:  Vital Signs Last 24 Hrs  T(C): 36.7 (22 Nov 2019 00:36), Max: 37.1 (21 Nov 2019 17:26)  T(F): 98.1 (22 Nov 2019 00:36), Max: 98.7 (21 Nov 2019 17:26)  HR: 94 (22 Nov 2019 00:36) (91 - 103)  BP: 107/60 (22 Nov 2019 00:36) (107/58 - 122/74)  BP(mean): --  RR: 18 (22 Nov 2019 00:36) (18 - 18)  SpO2: 95% (22 Nov 2019 00:36) (93% - 95%)    CAPILLARY BLOOD GLUCOSE      POCT Blood Glucose.: 121 mg/dL (21 Nov 2019 22:32)  POCT Blood Glucose.: 141 mg/dL (21 Nov 2019 17:51)  POCT Blood Glucose.: 132 mg/dL (21 Nov 2019 17:48)  POCT Blood Glucose.: 153 mg/dL (21 Nov 2019 13:46)  POCT Blood Glucose.: 117 mg/dL (21 Nov 2019 09:38)      I&O's Detail    20 Nov 2019 07:01  -  21 Nov 2019 07:00  --------------------------------------------------------  IN:    Enteral Tube Flush: 10 mL    Enteral Tube Flush: 5 mL    Oral Fluid: 680 mL    Solution: 50 mL    Solution: 50 mL  Total IN: 795 mL    OUT:    Bulb: 55 mL    Colostomy: 100 mL    Voided: 800 mL  Total OUT: 955 mL    Total NET: -160 mL      21 Nov 2019 07:01  -  22 Nov 2019 03:57  --------------------------------------------------------  IN:    Oral Fluid: 720 mL    Solution: 100 mL  Total IN: 820 mL    OUT:    Bulb: 35 mL    Colostomy: 150 mL    Drain: 10 mL    Voided: 500 mL  Total OUT: 695 mL    Total NET: 125 mL          MEDICATIONS  (STANDING):  alteplase for catheter clearance 2 milliGRAM(s) Catheter once  apixaban 5 milliGRAM(s) Oral every 12 hours  ascorbic acid 500 milliGRAM(s) Oral daily  chlorhexidine 2% Cloths 1 Application(s) Topical <User Schedule>  dextrose 5%. 1000 milliLiter(s) (50 mL/Hr) IV Continuous <Continuous>  dextrose 50% Injectable 12.5 Gram(s) IV Push once  dextrose 50% Injectable 25 Gram(s) IV Push once  dextrose 50% Injectable 25 Gram(s) IV Push once  ertapenem  IVPB 1000 milliGRAM(s) IV Intermittent every 24 hours  famotidine    Tablet 20 milliGRAM(s) Oral daily  folic acid 1 milliGRAM(s) Oral daily  insulin lispro (HumaLOG) corrective regimen sliding scale   SubCutaneous three times a day before meals  insulin lispro (HumaLOG) corrective regimen sliding scale   SubCutaneous at bedtime  loratadine 10 milliGRAM(s) Oral daily  montelukast 10 milliGRAM(s) Oral daily    MEDICATIONS  (PRN):  acetaminophen   Tablet .. 650 milliGRAM(s) Oral every 6 hours PRN Mild Pain (1 - 3)  ALBUTerol    90 MICROgram(s) HFA Inhaler 2 Puff(s) Inhalation every 6 hours PRN Shortness of Breath and/or Wheezing  dextrose 40% Gel 15 Gram(s) Oral once PRN Blood Glucose LESS THAN 70 milliGRAM(s)/deciLiter  glucagon  Injectable 1 milliGRAM(s) IntraMuscular once PRN Glucose <70 milliGRAM(s)/deciLiter  guaiFENesin   Syrup  (Sugar-Free) 100 milliGRAM(s) Oral every 6 hours PRN Cough  nystatin Powder 1 Application(s) Topical every 8 hours PRN Rash  sodium chloride 0.9% lock flush 10 milliLiter(s) IV Push every 1 hour PRN Pre/post blood products, medications, blood draw, and to maintain line patency          Labs:    11-21    141  |  104  |  9   ----------------------------<  116<H>  3.8   |  25  |  0.57    Ca    8.8      21 Nov 2019 07:07  Phos  3.5     11-21  Mg     1.7     11-21                              8.1    9.71  )-----------( 261      ( 21 Nov 2019 08:54 )             25.2     PTT - ( 20 Nov 2019 08:18 )  PTT:49.3 sec    Imaging:

## 2019-11-22 NOTE — DISCHARGE NOTE NURSING/CASE MANAGEMENT/SOCIAL WORK - NSDCPEELIQUIS_GEN_ALL_CORE
Apixaban/Eliquis - Follow up monitoring/Apixaban/Eliquis - Compliance/Apixaban/Eliquis - Dietary Advice/Apixaban/Eliquis - Potential for adverse drug reactions and interactions

## 2019-11-22 NOTE — PROGRESS NOTE ADULT - REASON FOR ADMISSION
Abscess

## 2019-11-22 NOTE — DISCHARGE NOTE NURSING/CASE MANAGEMENT/SOCIAL WORK - PATIENT PORTAL LINK FT
You can access the FollowMyHealth Patient Portal offered by [facility] by registering at the following website: [email or web address]. By joining [employer]’s FollowMyHealth portal, you will also be able to view your health information using other applications (apps) compatible with our system.

## 2019-11-22 NOTE — PROGRESS NOTE ADULT - SUBJECTIVE AND OBJECTIVE BOX
Interventional Radiology Follow- Up Note      This is a 74y Female s/p LLQ abscess drainage catheter placement on 11/19/19 in Interventional Radiology with Dr. De.    Patient seen and examined @ bedside. She continues to endorse mild discomfort upon manipulation of tubes, otherwise offers no complaints. Denies abdominal pain, nausea, vomiting.     PAST MEDICAL & SURGICAL HISTORY:  Refusal of blood transfusions as patient is Worship  Patient is Worship  Vertigo  Atrial flutter  Diverticulitis  Cardiomyopathy  Kidney stone  Cardiac Pacemaker  HTN - Hypertension  Gout  Diabetes  Congestive Heart Failure  Asthma  Status post left hemicolectomy  S/P cholecystectomy  AICD (Automatic Cardioverter/Defibrillator) Present: inserted in Aug, 2008. Due for battery change in 1 month. ( [employer]) . Inserted by Dr Duffy      Allergies: Coreg (Other)  digoxin (Other; Short breath (Mild to Mod))  IV Contrast (Unknown)  penicillins (Hives)    Intolerances: metoprolol (Other)  Solu-Medrol (Other (Mild to Mod))      LABS:                        8.1    9.71  )-----------( 261      ( 21 Nov 2019 08:54 )             25.2     11-21    141  |  104  |  9   ----------------------------<  116<H>  3.8   |  25  |  0.57    Ca    8.8      21 Nov 2019 07:07  Phos  3.5     11-21  Mg     1.7     11-21      Vitals: T(F): 98.5 (11-22-19 @ 08:53), Max: 98.7 (11-21-19 @ 17:26)  HR: 97 (11-22-19 @ 08:53) (91 - 103)  BP: 103/61 (11-22-19 @ 08:53) (103/61 - 122/74)  RR: 18 (11-22-19 @ 08:53) (18 - 18)  SpO2: 94% (11-22-19 @ 08:53) (94% - 95%)    PHYSICAL EXAM:  General: Nontoxic, in NAD, Awake and alert  Abd: obese, soft, mildly TTP over drain sites. More lateral Pigtail (16Fr) to BASIM with no output in bulb, no output documented over last 24hr. More medial pigtail to BASIM (12Fr) with 15cc of purulent output, 55cc documented over last 24hr.       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 De.   -continue to monitor output    -flush drain per doctor orders  -Continue abx therapy per ID recs  -trend vitals, labs  -Patient to follow up with IR in 2weeks for abscess tube check and repeat CT. IR booking office [phone]. Contact info given to patient.   -Please call IR at extension [phone] with any questions, concerns, or issues regarding above.

## 2019-11-22 NOTE — PROGRESS NOTE ADULT - ASSESSMENT
ASSESSMENT:  74 year old female with a PMH of asthma, COPD, CHF, rate controlled afib, PPM, recent admission for perforated diverticulitis, s/p colostomy, hospital course complicated by abdominal abscesses discharged with BASIM drain to rehab now admitted with abdominal pain, ct of a/p showing enlarging fluid collection concerning for abscess. s/p IR drainage.    PLAN:  - Appreciate ID recommendations: Continue Ertapenem 1g IV Q24H (End Date: 11/25/19)  - s/p IR drainage   - eliquis  - Wound care for draining wound  - Rehab auth pending  - Tube flushes every other day  - D/c today    [facility], [id number]

## 2019-11-22 NOTE — PROGRESS NOTE ADULT - ATTENDING COMMENTS
I have evaluated the relevant clinical findings and plan with the surgical housestaff and/or fellow.  Agree with the above documentation with addenda as noted.     Resting comfortably.  Abx as per ID recommendations.  2 drains stable.  Plan for discharge back to rehab.    Clark Alvarado MD

## 2019-11-27 ENCOUNTER — OUTPATIENT (OUTPATIENT)
Dept: OUTPATIENT SERVICES | Facility: HOSPITAL | Age: 74
LOS: 1 days | End: 2019-11-27

## 2019-11-27 ENCOUNTER — APPOINTMENT (OUTPATIENT)
Dept: CT IMAGING | Facility: HOSPITAL | Age: 74
End: 2019-11-27

## 2019-11-27 DIAGNOSIS — K65.1 PERITONEAL ABSCESS: ICD-10-CM

## 2019-11-27 DIAGNOSIS — Z90.49 ACQUIRED ABSENCE OF OTHER SPECIFIED PARTS OF DIGESTIVE TRACT: Chronic | ICD-10-CM

## 2019-12-02 ENCOUNTER — APPOINTMENT (OUTPATIENT)
Dept: INFECTIOUS DISEASE | Facility: CLINIC | Age: 74
End: 2019-12-02

## 2019-12-11 NOTE — PROGRESS NOTE ADULT - PROBLEM SELECTOR PROBLEM 7
Severe asthma without complication, unspecified whether persistent O-Z Plasty Text: The defect edges were debeveled with a #15 scalpel blade.  Given the location of the defect, shape of the defect and the proximity to free margins an O-Z plasty (double transposition flap) was deemed most appropriate.  Using a sterile surgical marker, the appropriate transposition flaps were drawn incorporating the defect and placing the expected incisions within the relaxed skin tension lines where possible.    The area thus outlined was incised deep to adipose tissue with a #15 scalpel blade.  The skin margins were undermined to an appropriate distance in all directions utilizing iris scissors.  Hemostasis was achieved with electrocautery.  The flaps were then transposed into place, one clockwise and the other counterclockwise, and anchored with interrupted buried subcutaneous sutures.

## 2019-12-12 ENCOUNTER — INPATIENT (INPATIENT)
Facility: HOSPITAL | Age: 74
LOS: 6 days | Discharge: NURSING, REHAB OR LONG-TERM CARE | DRG: 919 | End: 2019-12-19
Attending: COLON & RECTAL SURGERY | Admitting: COLON & RECTAL SURGERY
Payer: MEDICARE

## 2019-12-12 VITALS
WEIGHT: 143.96 LBS | HEIGHT: 62 IN | DIASTOLIC BLOOD PRESSURE: 58 MMHG | OXYGEN SATURATION: 98 % | TEMPERATURE: 98 F | HEART RATE: 76 BPM | SYSTOLIC BLOOD PRESSURE: 93 MMHG | RESPIRATION RATE: 18 BRPM

## 2019-12-12 DIAGNOSIS — T81.9XXA UNSPECIFIED COMPLICATION OF PROCEDURE, INITIAL ENCOUNTER: ICD-10-CM

## 2019-12-12 DIAGNOSIS — Z90.49 ACQUIRED ABSENCE OF OTHER SPECIFIED PARTS OF DIGESTIVE TRACT: Chronic | ICD-10-CM

## 2019-12-12 LAB
ALBUMIN SERPL ELPH-MCNC: 3 G/DL — LOW (ref 3.3–5)
ALP SERPL-CCNC: 67 U/L — SIGNIFICANT CHANGE UP (ref 40–120)
ALT FLD-CCNC: 8 U/L — LOW (ref 10–45)
ANION GAP SERPL CALC-SCNC: 12 MMOL/L — SIGNIFICANT CHANGE UP (ref 5–17)
APTT BLD: 31.8 SEC — SIGNIFICANT CHANGE UP (ref 27.5–36.3)
AST SERPL-CCNC: 13 U/L — SIGNIFICANT CHANGE UP (ref 10–40)
BASOPHILS # BLD AUTO: 0.09 K/UL — SIGNIFICANT CHANGE UP (ref 0–0.2)
BASOPHILS NFR BLD AUTO: 0.9 % — SIGNIFICANT CHANGE UP (ref 0–2)
BILIRUB SERPL-MCNC: <0.1 MG/DL — LOW (ref 0.2–1.2)
BUN SERPL-MCNC: 15 MG/DL — SIGNIFICANT CHANGE UP (ref 7–23)
CALCIUM SERPL-MCNC: 8.9 MG/DL — SIGNIFICANT CHANGE UP (ref 8.4–10.5)
CHLORIDE SERPL-SCNC: 104 MMOL/L — SIGNIFICANT CHANGE UP (ref 96–108)
CO2 SERPL-SCNC: 24 MMOL/L — SIGNIFICANT CHANGE UP (ref 22–31)
CREAT SERPL-MCNC: 0.61 MG/DL — SIGNIFICANT CHANGE UP (ref 0.5–1.3)
EOSINOPHIL # BLD AUTO: 0.9 K/UL — HIGH (ref 0–0.5)
EOSINOPHIL NFR BLD AUTO: 9.5 % — HIGH (ref 0–6)
GAS PNL BLDV: SIGNIFICANT CHANGE UP
GLUCOSE SERPL-MCNC: 67 MG/DL — LOW (ref 70–99)
HCT VFR BLD CALC: 28.3 % — LOW (ref 34.5–45)
HGB BLD-MCNC: 9 G/DL — LOW (ref 11.5–15.5)
INR BLD: 1.51 RATIO — HIGH (ref 0.88–1.16)
LIDOCAIN IGE QN: 28 U/L — SIGNIFICANT CHANGE UP (ref 7–60)
LYMPHOCYTES # BLD AUTO: 1.15 K/UL — SIGNIFICANT CHANGE UP (ref 1–3.3)
LYMPHOCYTES # BLD AUTO: 12.2 % — LOW (ref 13–44)
MCHC RBC-ENTMCNC: 31.8 GM/DL — LOW (ref 32–36)
MCHC RBC-ENTMCNC: 33.7 PG — SIGNIFICANT CHANGE UP (ref 27–34)
MCV RBC AUTO: 106 FL — HIGH (ref 80–100)
MONOCYTES # BLD AUTO: 0.49 K/UL — SIGNIFICANT CHANGE UP (ref 0–0.9)
MONOCYTES NFR BLD AUTO: 5.2 % — SIGNIFICANT CHANGE UP (ref 2–14)
NEUTROPHILS # BLD AUTO: 6.25 K/UL — SIGNIFICANT CHANGE UP (ref 1.8–7.4)
NEUTROPHILS NFR BLD AUTO: 66.1 % — SIGNIFICANT CHANGE UP (ref 43–77)
PLATELET # BLD AUTO: 267 K/UL — SIGNIFICANT CHANGE UP (ref 150–400)
POTASSIUM SERPL-MCNC: 3.5 MMOL/L — SIGNIFICANT CHANGE UP (ref 3.5–5.3)
POTASSIUM SERPL-SCNC: 3.5 MMOL/L — SIGNIFICANT CHANGE UP (ref 3.5–5.3)
PROT SERPL-MCNC: 6.4 G/DL — SIGNIFICANT CHANGE UP (ref 6–8.3)
PROTHROM AB SERPL-ACNC: 17.6 SEC — HIGH (ref 10–12.9)
RBC # BLD: 2.67 M/UL — LOW (ref 3.8–5.2)
RBC # FLD: 13.3 % — SIGNIFICANT CHANGE UP (ref 10.3–14.5)
SODIUM SERPL-SCNC: 140 MMOL/L — SIGNIFICANT CHANGE UP (ref 135–145)
WBC # BLD: 9.45 K/UL — SIGNIFICANT CHANGE UP (ref 3.8–10.5)
WBC # FLD AUTO: 9.45 K/UL — SIGNIFICANT CHANGE UP (ref 3.8–10.5)

## 2019-12-12 PROCEDURE — 99285 EMERGENCY DEPT VISIT HI MDM: CPT

## 2019-12-12 PROCEDURE — 74177 CT ABD & PELVIS W/CONTRAST: CPT | Mod: 26

## 2019-12-12 PROCEDURE — 71045 X-RAY EXAM CHEST 1 VIEW: CPT | Mod: 26

## 2019-12-12 RX ORDER — MONTELUKAST 4 MG/1
10 TABLET, CHEWABLE ORAL DAILY
Refills: 0 | Status: DISCONTINUED | OUTPATIENT
Start: 2019-12-12 | End: 2019-12-19

## 2019-12-12 RX ORDER — FOLIC ACID 0.8 MG
1 TABLET ORAL DAILY
Refills: 0 | Status: DISCONTINUED | OUTPATIENT
Start: 2019-12-12 | End: 2019-12-19

## 2019-12-12 RX ORDER — SPIRONOLACTONE 25 MG/1
25 TABLET, FILM COATED ORAL DAILY
Refills: 0 | Status: DISCONTINUED | OUTPATIENT
Start: 2019-12-12 | End: 2019-12-19

## 2019-12-12 RX ORDER — BUDESONIDE AND FORMOTEROL FUMARATE DIHYDRATE 160; 4.5 UG/1; UG/1
2 AEROSOL RESPIRATORY (INHALATION)
Refills: 0 | Status: DISCONTINUED | OUTPATIENT
Start: 2019-12-12 | End: 2019-12-19

## 2019-12-12 RX ORDER — DEXTROSE 50 % IN WATER 50 %
50 SYRINGE (ML) INTRAVENOUS ONCE
Refills: 0 | Status: COMPLETED | OUTPATIENT
Start: 2019-12-12 | End: 2019-12-12

## 2019-12-12 RX ORDER — PANTOPRAZOLE SODIUM 20 MG/1
40 TABLET, DELAYED RELEASE ORAL
Refills: 0 | Status: DISCONTINUED | OUTPATIENT
Start: 2019-12-12 | End: 2019-12-19

## 2019-12-12 RX ORDER — MORPHINE SULFATE 50 MG/1
4 CAPSULE, EXTENDED RELEASE ORAL ONCE
Refills: 0 | Status: DISCONTINUED | OUTPATIENT
Start: 2019-12-12 | End: 2019-12-12

## 2019-12-12 RX ORDER — FUROSEMIDE 40 MG
40 TABLET ORAL
Refills: 0 | Status: DISCONTINUED | OUTPATIENT
Start: 2019-12-12 | End: 2019-12-19

## 2019-12-12 RX ORDER — ASCORBIC ACID 60 MG
500 TABLET,CHEWABLE ORAL DAILY
Refills: 0 | Status: DISCONTINUED | OUTPATIENT
Start: 2019-12-12 | End: 2019-12-19

## 2019-12-12 RX ORDER — SODIUM CHLORIDE 9 MG/ML
1000 INJECTION, SOLUTION INTRAVENOUS
Refills: 0 | Status: DISCONTINUED | OUTPATIENT
Start: 2019-12-12 | End: 2019-12-13

## 2019-12-12 RX ORDER — ACETAMINOPHEN 500 MG
650 TABLET ORAL ONCE
Refills: 0 | Status: COMPLETED | OUTPATIENT
Start: 2019-12-12 | End: 2019-12-12

## 2019-12-12 RX ORDER — ALBUTEROL 90 UG/1
1 AEROSOL, METERED ORAL EVERY 4 HOURS
Refills: 0 | Status: DISCONTINUED | OUTPATIENT
Start: 2019-12-12 | End: 2019-12-19

## 2019-12-12 RX ORDER — ATORVASTATIN CALCIUM 80 MG/1
20 TABLET, FILM COATED ORAL AT BEDTIME
Refills: 0 | Status: DISCONTINUED | OUTPATIENT
Start: 2019-12-12 | End: 2019-12-19

## 2019-12-12 RX ORDER — HEPARIN SODIUM 5000 [USP'U]/ML
5000 INJECTION INTRAVENOUS; SUBCUTANEOUS EVERY 8 HOURS
Refills: 0 | Status: DISCONTINUED | OUTPATIENT
Start: 2019-12-12 | End: 2019-12-13

## 2019-12-12 RX ORDER — ACETAMINOPHEN 500 MG
975 TABLET ORAL ONCE
Refills: 0 | Status: DISCONTINUED | OUTPATIENT
Start: 2019-12-12 | End: 2019-12-12

## 2019-12-12 RX ADMIN — Medication 650 MILLIGRAM(S): at 18:08

## 2019-12-12 RX ADMIN — Medication 50 MILLILITER(S): at 17:01

## 2019-12-12 NOTE — H&P ADULT - HISTORY OF PRESENT ILLNESS
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 The patient said her pain began all of a sudden a week ago. She could not describe how the pain felt but she rated the pain a 7/10 that was non-radiating that comes and goes located at the LLQ. Over the course of the week the pain got worse, and today (12/12), upon awakening, the pain got significantly worse and is now sharp which prompted her to come to the ED.  The only thing that has alleviated the pain is Tylenol but moving around or touching the LLQ drain site makes the pain significantly worse. Otherwise the patient endorsed having a good appetite and her ostomy bag has been filling consistently. Furthermore the patient does not endorse having any fevers/chills, nausea or vomiting, SOB, or CP.      In the ER she was afebrile, non tachycardic with SBP 90s-100.  On exam she was obese, tender at site of most lateral IR drain in LLQ without diffuse tenderness, LLQ drain stitch noted to be out and leaking slightly around site.  Ostomy pink and patent.  Labs showed a WBC of 9, no signs of systemic acidosis.  CTAP was performed with rectal contrast which re-demonstrated the Lupe stump leak with contrast extravasating near the abdominal drains and into the fistula site.  Of not she has a picc in place but has been off antibiotics (ertapenam) for a week.      PMH/PSH  Refusal of blood transfusions as patient is Oriental orthodox  Patient is Oriental orthodox  Vertigo  Atrial flutter  Diverticulitis  Cardiomyopathy  Kidney stone  COPD (chronic obstructive pulmonary disease)  Cardiac Pacemaker  HTN - Hypertension  Gout  Diabetes  Congestive Heart Failure  Asthma    Status post left hemicolectomy  S/P cholecystectomy  AICD (Automatic Cardioverter/Defibrillator) Present  S/P Cholecystectomy      MEDICATIONS  Home Medications:  acetaminophen 325 mg oral tablet: 2 tab(s) orally every 6 hours, As needed, Mild Pain (1 - 3), Moderate Pain (4 - 6) (11 Nov 2019 15:43)  apixaban 5 mg oral tablet: 1 tab(s) orally every 12 hours (22 Nov 2019 08:06)  ascorbic acid 500 mg oral tablet: 1 tab(s) orally once a day (09 Nov 2019 12:04)  Aspir 81 oral delayed release tablet: 1 tab(s) orally once a day (22 Nov 2019 15:12)  atorvastatin 20 mg oral tablet: 1 tab(s) orally once a day (at bedtime) (09 Nov 2019 12:04)  cetirizine 10 mg oral tablet: 1 tab(s) orally once a day (22 Nov 2019 15:12)  enalapril 10 mg oral tablet: 1 tab(s) orally 2 times a day (22 Nov 2019 15:12)  ferrous sulfate 325 mg (65 mg elemental iron) oral tablet: 1 tab(s) orally 2 times a day (09 Nov 2019 12:04)  folic acid 1 mg oral tablet: 1 tab(s) orally once a day (09 Nov 2019 12:04)  furosemide 40 mg oral tablet: 1  orally 2 times a day (22 Nov 2019 15:16)  guaiFENesin 100 mg/5 mL oral liquid: 5 milliliter(s) orally every 6 hours, As needed, Cough (22 Nov 2019 08:06)  magnesium oxide 500 mg oral tablet: 1 tab(s) orally 3 times a day max daily dose 3 tablets  (22 Nov 2019 15:12)  nystatin 100,000 units/g topical powder: 1 application topically every 8 hours, As needed, Rash (22 Nov 2019 08:06)  omeprazole 40 mg oral delayed release capsule: 1 cap(s) orally once a day (22 Nov 2019 15:12)  potassium chloride 20 mEq/15 mL oral liquid: 15 milliliter(s) orally once a day only as needed  (22 Nov 2019 15:24)  repaglinide 0.5 mg oral tablet: 1 tab(s) orally 3 times a day (before meals) (22 Nov 2019 15:12)  spironolactone 25 mg oral tablet: 1 tab(s) orally once a day (09 Nov 2019 12:04)  Symbicort 160 mcg-4.5 mcg/inh inhalation aerosol: 2 puff(s) inhaled once a day (at bedtime) (22 Nov 2019 15:17)  Ventolin HFA 90 mcg/inh inhalation aerosol: 1 puff(s) inhaled every 4 hours-6 hours as needed  (22 Nov 2019 15:12)      Allergies    Coreg (Other)  digoxin (Other; Short breath (Mild to Mod))  penicillins (Hives)    Intolerances    metoprolol (Other)  Solu-Medrol (Other (Mild to Mod))      Social    Physical Exam  T(C): 36.4 (12-12-19 @ 20:06), Max: 36.6 (12-12-19 @ 12:40)  HR: 87 (12-12-19 @ 20:06) (76 - 88)  BP: 95/68 (12-12-19 @ 20:06) (93/58 - 121/80)  RR: 18 (12-12-19 @ 20:06) (18 - 18)  SpO2: 99% (12-12-19 @ 20:06) (98% - 99%)  Wt(kg): --  Tmax: T(C): , Max: 36.6 (12-12-19 @ 12:40)  Wt(kg): --      Gen: NAD  Neuro: AAOx3  HEENT: normocephalic, atraumatic, no scleral icterus  CV: S1, S2, RRR  Pulm: CTA B/L  Abd: obese, tender at site of most lateral IR drain in LLQ without diffuse tenderness, LLQ drain stitch noted to be out and leaking slightly around site.  Ostomy pink and patent.  Ext: warm, no edema    LABS                        9.0    9.45  )-----------( 267      ( 12 Dec 2019 14:59 )             28.3     12-12    140  |  104  |  15  ----------------------------<  67<L>  3.5   |  24  |  0.61    Ca    8.9      12 Dec 2019 14:59    TPro  6.4  /  Alb  3.0<L>  /  TBili  <0.1<L>  /  DBili  x   /  AST  13  /  ALT  8<L>  /  AlkPhos  67  12-12    PT/INR - ( 12 Dec 2019 14:59 )   PT: 17.6 sec;   INR: 1.51 ratio         PTT - ( 12 Dec 2019 14:59 )  PTT:31.8 sec          IMAGING  < from: CT Abdomen and Pelvis w/ IV Cont (12.12.19 @ 19:20) >    LOWER CHEST: Linear atelectasis in the right middle and lower lobes.   Partially visualized AICD leads.    LIVER: Within normal limits.  BILE DUCTS: Normal caliber.  GALLBLADDER: Cholecystectomy.  SPLEEN: Within normal limits.  PANCREAS: Within normal limits.  ADRENALS: Within normal limits.  KIDNEYS/URETERS: A few nonobstructing left renal stones, measuring up to   6 mm.    BLADDER: Within normal limits.  REPRODUCTIVE ORGANS: Uterine fibroids.    BOWEL/PERITONEUM: Status post Delgado's procedure with left hemicolectomy   and right-sided ostomy. Colonic diverticulosis. No bowel obstruction.   Contrast administered via rectal tube is noted to leak from the Delgado   stump with extraluminal contrast tracking toward the left hemiabdomen at   the site of a percutaneous drainage catheter as well as toward the   anterior abdominal wall at the site of a percutaneous catheter. No   ascites or discrete intra-abdominal collection.   VESSELS: Atherosclerotic changes.  RETROPERITONEUM/LYMPH NODES: No lymphadenopathy.    ABDOMINAL WALL: Large anterior abdominal wall hernia. Right lower   quadrant ostomy.: Collapsed midline abdominal wall collection containing   extraluminal contrast without change in size from prior CT 11/13/2019.   Percutaneous drainage catheter in place.  BONES: Degenerative changes.    IMPRESSION:     Active leak of contrast from a Delgado stump with a collapsed midline   anterior abdominal wall collection which is not significantly changed   from prior imaging 11/13/2019.            < end of copied text >

## 2019-12-12 NOTE — ED ADULT NURSE NOTE - NSIMPLEMENTINTERV_GEN_ALL_ED
Implemented All Fall with Harm Risk Interventions:  [place] to call system. Call bell, personal items and telephone within reach. Instruct patient to call for assistance. Room bathroom lighting operational. Non-slip footwear when patient is off stretcher. Physically safe environment: no spills, clutter or unnecessary equipment. Stretcher in lowest position, wheels locked, appropriate side rails in place. Provide visual cue, wrist band, yellow gown, etc. Monitor gait and stability. Monitor for mental status changes and reorient to person, place, and time. Review medications for side effects contributing to fall risk. Reinforce activity limits and safety measures with patient and family. Provide visual clues: red socks.

## 2019-12-12 NOTE — H&P ADULT - ASSESSMENT
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 Ongoing sigmoid stump leak on CT with rectal contrast, drains in place, left drain dislodged with minor leakage around site    -Admit to surgery   - NPO after midnight for possible tube check with IR  - Will hold off on antibiotics as patient not exhibiting signs of infection at this time, no collection on CTAP.   -Continue home meds, will hold eliquis and ASA now    D/W Dr. Osorio

## 2019-12-12 NOTE — ED PROVIDER NOTE - PMH
Asthma    Atrial flutter    Cardiac Pacemaker    Cardiomyopathy    Congestive Heart Failure    Diabetes    Diverticulitis    Gout    HTN - Hypertension    Kidney stone    Patient is Cheondoism    Refusal of blood transfusions as patient is Cheondoism    Vertigo

## 2019-12-12 NOTE — ED PROVIDER NOTE - PROGRESS NOTE DETAILS
Shelby Vaughan, resident MD: called Dr. Alvarado to inform that pt is in ED and working up with CT a/p with IV and rectal contrast, no further testing was requested. awaiting lab results and CT scan. Attending MD Nolan.  PT signed out to me in stable condition pending d50, CTAP, surg cx, TBA Hx of complicated ruptured divertic, cutaneous fistula, 2 drains, total colectomy, ileostomy here with serosanguinous drainage. Shelby Vaughan, resident MD: pt still awaiting CT a/p. pt denied morphine and was given tylenol but not in any acute distress at this time. Harley-PGY1: paged surgery consult re: CT findings, awaiting callback.

## 2019-12-12 NOTE — H&P ADULT - NSICDXPASTMEDICALHX_GEN_ALL_CORE_FT
PAST MEDICAL HISTORY:  Asthma     Atrial flutter     Cardiac Pacemaker     Cardiomyopathy     Congestive Heart Failure     Diabetes     Diverticulitis     Gout     HTN - Hypertension     Kidney stone     Patient is Christianity     Refusal of blood transfusions as patient is Christianity     Vertigo

## 2019-12-12 NOTE — ED ADULT NURSE NOTE - OBJECTIVE STATEMENT
Pt presents to the ED with complaint of left flank pain, AXOX3 with daughter at bedside, hx of COPD, afib on eliquis, asthma, HTN, CHF, pt coming from [facility] following surgery on complicated diverticulitis. Pt reporting 4 days of pain to 2 BASIM insertion sites on Left flank, pt reports increased pain with palpation of site and of drain tubes, drain insertion sutures well approximated, no drainage, dressing clean and dry, BASIM drain has serosanguinous fluid in collection chamber,  pt has colostomy bag in place, ileostomy tube in place, dressing to R leg covering skin graft donation site, pt has pressure ulcer Stage 2 to sacrum, pt denies chest pain, breathing unlabored, symmetrical, no shortness of breath, no fevers or chills, abdomen distended, firm, tender to palpation diffusely, increased with palpation of LLQ, no nausea vomiting or diarrhea, no hematuria or dysuria.

## 2019-12-12 NOTE — ED PROVIDER NOTE - NS ED ROS FT
General: no fevers  Head: no lightheadedness  Eyes: no vision change  CV: no chest pain  Resp: no SOB  GI: no N/V, +abdominal pain, +warm abdomen, +abnormal BASIM drainage  : no dysuria  MSK: no joint pain, no muscle aches, no neck pain or back pain  Skin: +sacral ulcer  Neuro: no focal weakness

## 2019-12-12 NOTE — ED PROVIDER NOTE - OBJECTIVE STATEMENT
75yo woman PMH CHF, afib on eliquis s/p PPM, asthma, HLD, HTN, with recent history of perforated diverticulitis with subsequent wash outs and BASIM drain placement was sent in from [facility] after speaking with Dr. Alvarado for worsening abdominal pain at BASIM drain insertion site x 4 days. Pt states that she typically has pain but has been having severe pain and has trouble moving or touching her abdomen due to pain. Pt states that her abdomen is warm to touch. No fevers. No n/v. No dysuria.  Clerical note states recommendations of CT a/p with IV and rectal contrast for evaluation.

## 2019-12-12 NOTE — ED CLERICAL - NS ED CLERK NOTE PRE-ARRIVAL INFORMATION; ADDITIONAL PRE-ARRIVAL INFORMATION
CC/Reason For referral: abdominal pain, ct abdomin w/ rectal contrast  Preferred Consultant(if applicable): green surg  Who admits for you (if needed):  Do you have documents you would like to fax over?  Would you still like to speak to an ED attending?

## 2019-12-12 NOTE — ED PROVIDER NOTE - CLINICAL SUMMARY MEDICAL DECISION MAKING FREE TEXT BOX
Attending MD Qureshi: 74y Female with a hx of diverticulitis s/p Delgado procedure with abdominal abscesses s/p drainage on 8/16 and repositioning and upsizing on 8/21, 9/13 recently admitted with abdominal pain found to have enlarging collection on CT s/p drainage on 11/19 in Interventional Radiology with Dr. Adriana Allen Attending MD Qrueshi: 74y Female with a hx of diverticulitis s/p Delgado procedure with abdominal abscesses s/p drainage on 8/16 and repositioning and upsizing on 8/21, 9/13 recently admitted with abdominal pain found to have enlarging collection on CT s/p drainage on 11/19 in Interventional Radiology with Dr. Adriana Allen.    Patient now with worsening abd pain.  Denies fevers.  On exam there is a ileostomy, cutaneous fistula bag, 2 BASIM drains one with seropurulent drainage.   Plan: surgical consult, labs, CT abd and pelvis and admit to hospital.

## 2019-12-12 NOTE — ED PROVIDER NOTE - PHYSICAL EXAMINATION
General: elderly woman in mild distress due to pain  Head: normocephalic, atraumatic  Eyes: clear eyes, no discharge  Mouth: moist mucous membranes  Neck: supple neck, well healed anterior scar from previous trach site  CV: normal rate, minimal LE edema with no tenderness to palpation, peripheral pulses 2+ bilaterally  Respiratory: breathing comfortably and in no respiratory distress  Abdomen: soft but very tender to palpation, well-healed grafted surgical site on ant abdomen with 2 scabbed lesion, ostomy site on right abdomen, fistula drain at umbilical region, 2 BASIM drains with brown/sanguinous fluids on left abdomen with dressing at insertion site, no purulent drainage at insertion site  Neuro: alert and interactive, speech clear, moving all extremities spontaneously

## 2019-12-12 NOTE — H&P ADULT - NSICDXPASTSURGICALHX_GEN_ALL_CORE_FT
PAST SURGICAL HISTORY:  AICD (Automatic Cardioverter/Defibrillator) Present inserted in Aug, 2008. Due for battery change in 1 month. ( [employer]) . Inserted by Dr Duffy    S/P cholecystectomy     Status post left hemicolectomy

## 2019-12-12 NOTE — ED PROVIDER NOTE - ATTENDING CONTRIBUTION TO CARE
Attending MD Qureshi:  I personally have seen and examined this patient.  Resident note reviewed and agree on plan of care and except where noted.

## 2019-12-13 LAB
ANION GAP SERPL CALC-SCNC: 10 MMOL/L — SIGNIFICANT CHANGE UP (ref 5–17)
APTT BLD: 32.4 SEC — SIGNIFICANT CHANGE UP (ref 27.5–36.3)
BLD GP AB SCN SERPL QL: NEGATIVE — SIGNIFICANT CHANGE UP
BUN SERPL-MCNC: 12 MG/DL — SIGNIFICANT CHANGE UP (ref 7–23)
CALCIUM SERPL-MCNC: 8.6 MG/DL — SIGNIFICANT CHANGE UP (ref 8.4–10.5)
CHLORIDE SERPL-SCNC: 104 MMOL/L — SIGNIFICANT CHANGE UP (ref 96–108)
CO2 SERPL-SCNC: 24 MMOL/L — SIGNIFICANT CHANGE UP (ref 22–31)
CREAT SERPL-MCNC: 0.6 MG/DL — SIGNIFICANT CHANGE UP (ref 0.5–1.3)
GLUCOSE BLDC GLUCOMTR-MCNC: 106 MG/DL — HIGH (ref 70–99)
GLUCOSE BLDC GLUCOMTR-MCNC: 114 MG/DL — HIGH (ref 70–99)
GLUCOSE BLDC GLUCOMTR-MCNC: 172 MG/DL — HIGH (ref 70–99)
GLUCOSE BLDC GLUCOMTR-MCNC: 86 MG/DL — SIGNIFICANT CHANGE UP (ref 70–99)
GLUCOSE SERPL-MCNC: 94 MG/DL — SIGNIFICANT CHANGE UP (ref 70–99)
HCT VFR BLD CALC: 27 % — LOW (ref 34.5–45)
HGB BLD-MCNC: 8.4 G/DL — LOW (ref 11.5–15.5)
INR BLD: 1.6 RATIO — HIGH (ref 0.88–1.16)
MAGNESIUM SERPL-MCNC: 1.7 MG/DL — SIGNIFICANT CHANGE UP (ref 1.6–2.6)
MCHC RBC-ENTMCNC: 31.1 GM/DL — LOW (ref 32–36)
MCHC RBC-ENTMCNC: 33.2 PG — SIGNIFICANT CHANGE UP (ref 27–34)
MCV RBC AUTO: 106.7 FL — HIGH (ref 80–100)
NRBC # BLD: 0 /100 WBCS — SIGNIFICANT CHANGE UP (ref 0–0)
PHOSPHATE SERPL-MCNC: 4.2 MG/DL — SIGNIFICANT CHANGE UP (ref 2.5–4.5)
PLATELET # BLD AUTO: 252 K/UL — SIGNIFICANT CHANGE UP (ref 150–400)
POTASSIUM SERPL-MCNC: 3.2 MMOL/L — LOW (ref 3.5–5.3)
POTASSIUM SERPL-SCNC: 3.2 MMOL/L — LOW (ref 3.5–5.3)
PROTHROM AB SERPL-ACNC: 18.7 SEC — HIGH (ref 10–12.9)
RBC # BLD: 2.53 M/UL — LOW (ref 3.8–5.2)
RBC # FLD: 13.3 % — SIGNIFICANT CHANGE UP (ref 10.3–14.5)
RH IG SCN BLD-IMP: POSITIVE — SIGNIFICANT CHANGE UP
SODIUM SERPL-SCNC: 138 MMOL/L — SIGNIFICANT CHANGE UP (ref 135–145)
WBC # BLD: 8.02 K/UL — SIGNIFICANT CHANGE UP (ref 3.8–10.5)
WBC # FLD AUTO: 8.02 K/UL — SIGNIFICANT CHANGE UP (ref 3.8–10.5)

## 2019-12-13 PROCEDURE — 99222 1ST HOSP IP/OBS MODERATE 55: CPT

## 2019-12-13 PROCEDURE — 99231 SBSQ HOSP IP/OBS SF/LOW 25: CPT

## 2019-12-13 RX ORDER — PHYTONADIONE (VIT K1) 5 MG
10 TABLET ORAL ONCE
Refills: 0 | Status: COMPLETED | OUTPATIENT
Start: 2019-12-13 | End: 2019-12-13

## 2019-12-13 RX ORDER — ACETAMINOPHEN 500 MG
650 TABLET ORAL EVERY 6 HOURS
Refills: 0 | Status: DISCONTINUED | OUTPATIENT
Start: 2019-12-13 | End: 2019-12-19

## 2019-12-13 RX ORDER — DEXTROSE 50 % IN WATER 50 %
12.5 SYRINGE (ML) INTRAVENOUS ONCE
Refills: 0 | Status: DISCONTINUED | OUTPATIENT
Start: 2019-12-13 | End: 2019-12-19

## 2019-12-13 RX ORDER — INSULIN LISPRO 100/ML
VIAL (ML) SUBCUTANEOUS AT BEDTIME
Refills: 0 | Status: DISCONTINUED | OUTPATIENT
Start: 2019-12-13 | End: 2019-12-16

## 2019-12-13 RX ORDER — LIDOCAINE HCL 20 MG/ML
10 VIAL (ML) INJECTION ONCE
Refills: 0 | Status: COMPLETED | OUTPATIENT
Start: 2019-12-13 | End: 2019-12-13

## 2019-12-13 RX ORDER — CIPROFLOXACIN LACTATE 400MG/40ML
VIAL (ML) INTRAVENOUS
Refills: 0 | Status: DISCONTINUED | OUTPATIENT
Start: 2019-12-13 | End: 2019-12-16

## 2019-12-13 RX ORDER — DEXTROSE 50 % IN WATER 50 %
15 SYRINGE (ML) INTRAVENOUS ONCE
Refills: 0 | Status: DISCONTINUED | OUTPATIENT
Start: 2019-12-13 | End: 2019-12-19

## 2019-12-13 RX ORDER — INSULIN LISPRO 100/ML
VIAL (ML) SUBCUTANEOUS EVERY 6 HOURS
Refills: 0 | Status: DISCONTINUED | OUTPATIENT
Start: 2019-12-13 | End: 2019-12-13

## 2019-12-13 RX ORDER — CIPROFLOXACIN LACTATE 400MG/40ML
400 VIAL (ML) INTRAVENOUS ONCE
Refills: 0 | Status: COMPLETED | OUTPATIENT
Start: 2019-12-13 | End: 2019-12-13

## 2019-12-13 RX ORDER — ACETAMINOPHEN 500 MG
650 TABLET ORAL ONCE
Refills: 0 | Status: COMPLETED | OUTPATIENT
Start: 2019-12-13 | End: 2019-12-13

## 2019-12-13 RX ORDER — SODIUM CHLORIDE 9 MG/ML
1000 INJECTION, SOLUTION INTRAVENOUS
Refills: 0 | Status: DISCONTINUED | OUTPATIENT
Start: 2019-12-13 | End: 2019-12-14

## 2019-12-13 RX ORDER — INSULIN LISPRO 100/ML
VIAL (ML) SUBCUTANEOUS
Refills: 0 | Status: DISCONTINUED | OUTPATIENT
Start: 2019-12-13 | End: 2019-12-16

## 2019-12-13 RX ORDER — MAGNESIUM SULFATE 500 MG/ML
2 VIAL (ML) INJECTION ONCE
Refills: 0 | Status: COMPLETED | OUTPATIENT
Start: 2019-12-13 | End: 2019-12-13

## 2019-12-13 RX ORDER — METRONIDAZOLE 500 MG
500 TABLET ORAL ONCE
Refills: 0 | Status: COMPLETED | OUTPATIENT
Start: 2019-12-13 | End: 2019-12-13

## 2019-12-13 RX ORDER — POTASSIUM CHLORIDE 20 MEQ
10 PACKET (EA) ORAL ONCE
Refills: 0 | Status: COMPLETED | OUTPATIENT
Start: 2019-12-13 | End: 2019-12-13

## 2019-12-13 RX ORDER — ACETAMINOPHEN 500 MG
1000 TABLET ORAL ONCE
Refills: 0 | Status: DISCONTINUED | OUTPATIENT
Start: 2019-12-13 | End: 2019-12-13

## 2019-12-13 RX ORDER — METRONIDAZOLE 500 MG
TABLET ORAL
Refills: 0 | Status: DISCONTINUED | OUTPATIENT
Start: 2019-12-13 | End: 2019-12-16

## 2019-12-13 RX ORDER — GLUCAGON INJECTION, SOLUTION 0.5 MG/.1ML
1 INJECTION, SOLUTION SUBCUTANEOUS ONCE
Refills: 0 | Status: DISCONTINUED | OUTPATIENT
Start: 2019-12-13 | End: 2019-12-19

## 2019-12-13 RX ORDER — HEPARIN SODIUM 5000 [USP'U]/ML
5000 INJECTION INTRAVENOUS; SUBCUTANEOUS EVERY 8 HOURS
Refills: 0 | Status: DISCONTINUED | OUTPATIENT
Start: 2019-12-13 | End: 2019-12-15

## 2019-12-13 RX ORDER — DEXTROSE 50 % IN WATER 50 %
25 SYRINGE (ML) INTRAVENOUS ONCE
Refills: 0 | Status: DISCONTINUED | OUTPATIENT
Start: 2019-12-13 | End: 2019-12-19

## 2019-12-13 RX ORDER — METRONIDAZOLE 500 MG
500 TABLET ORAL EVERY 8 HOURS
Refills: 0 | Status: DISCONTINUED | OUTPATIENT
Start: 2019-12-13 | End: 2019-12-16

## 2019-12-13 RX ORDER — CIPROFLOXACIN LACTATE 400MG/40ML
400 VIAL (ML) INTRAVENOUS EVERY 12 HOURS
Refills: 0 | Status: DISCONTINUED | OUTPATIENT
Start: 2019-12-13 | End: 2019-12-16

## 2019-12-13 RX ORDER — ASPIRIN/CALCIUM CARB/MAGNESIUM 324 MG
81 TABLET ORAL DAILY
Refills: 0 | Status: DISCONTINUED | OUTPATIENT
Start: 2019-12-13 | End: 2019-12-14

## 2019-12-13 RX ORDER — SODIUM CHLORIDE 9 MG/ML
1000 INJECTION, SOLUTION INTRAVENOUS
Refills: 0 | Status: DISCONTINUED | OUTPATIENT
Start: 2019-12-13 | End: 2019-12-19

## 2019-12-13 RX ADMIN — Medication 100 MILLIGRAM(S): at 07:32

## 2019-12-13 RX ADMIN — Medication 200 MILLIGRAM(S): at 17:52

## 2019-12-13 RX ADMIN — SODIUM CHLORIDE 100 MILLILITER(S): 9 INJECTION, SOLUTION INTRAVENOUS at 00:22

## 2019-12-13 RX ADMIN — Medication 650 MILLIGRAM(S): at 21:45

## 2019-12-13 RX ADMIN — MONTELUKAST 10 MILLIGRAM(S): 4 TABLET, CHEWABLE ORAL at 11:35

## 2019-12-13 RX ADMIN — HEPARIN SODIUM 5000 UNIT(S): 5000 INJECTION INTRAVENOUS; SUBCUTANEOUS at 21:24

## 2019-12-13 RX ADMIN — PANTOPRAZOLE SODIUM 40 MILLIGRAM(S): 20 TABLET, DELAYED RELEASE ORAL at 06:04

## 2019-12-13 RX ADMIN — Medication 100 MILLIEQUIVALENT(S): at 11:26

## 2019-12-13 RX ADMIN — Medication 10 MILLILITER(S): at 14:12

## 2019-12-13 RX ADMIN — Medication 100 MILLIGRAM(S): at 21:24

## 2019-12-13 RX ADMIN — Medication 40 MILLIGRAM(S): at 06:04

## 2019-12-13 RX ADMIN — Medication 1 MILLIGRAM(S): at 11:36

## 2019-12-13 RX ADMIN — Medication 650 MILLIGRAM(S): at 03:17

## 2019-12-13 RX ADMIN — Medication 100 MILLIGRAM(S): at 14:18

## 2019-12-13 RX ADMIN — HEPARIN SODIUM 5000 UNIT(S): 5000 INJECTION INTRAVENOUS; SUBCUTANEOUS at 14:18

## 2019-12-13 RX ADMIN — ATORVASTATIN CALCIUM 20 MILLIGRAM(S): 80 TABLET, FILM COATED ORAL at 21:24

## 2019-12-13 RX ADMIN — Medication 50 GRAM(S): at 10:22

## 2019-12-13 RX ADMIN — Medication 500 MILLIGRAM(S): at 11:36

## 2019-12-13 RX ADMIN — Medication 650 MILLIGRAM(S): at 02:47

## 2019-12-13 RX ADMIN — ALBUTEROL 1 PUFF(S): 90 AEROSOL, METERED ORAL at 06:04

## 2019-12-13 RX ADMIN — Medication 102 MILLIGRAM(S): at 03:57

## 2019-12-13 RX ADMIN — SODIUM CHLORIDE 100 MILLILITER(S): 9 INJECTION, SOLUTION INTRAVENOUS at 10:24

## 2019-12-13 RX ADMIN — BUDESONIDE AND FORMOTEROL FUMARATE DIHYDRATE 2 PUFF(S): 160; 4.5 AEROSOL RESPIRATORY (INHALATION) at 06:03

## 2019-12-13 RX ADMIN — Medication 650 MILLIGRAM(S): at 21:15

## 2019-12-13 RX ADMIN — Medication 81 MILLIGRAM(S): at 21:28

## 2019-12-13 RX ADMIN — SPIRONOLACTONE 25 MILLIGRAM(S): 25 TABLET, FILM COATED ORAL at 06:04

## 2019-12-13 RX ADMIN — HEPARIN SODIUM 5000 UNIT(S): 5000 INJECTION INTRAVENOUS; SUBCUTANEOUS at 06:04

## 2019-12-13 RX ADMIN — Medication 200 MILLIGRAM(S): at 06:08

## 2019-12-13 NOTE — PROGRESS NOTE ADULT - ATTENDING COMMENTS
I have seen and evaluated the patient and discussed the relevant clinical findings and plan with the surgical housestaff and fellow.  Agree with the above documentation with addenda as noted.     Patient well known to me, comes in from rehab with abdominal pain at L BASIM  CT reviewed -- small collection L abd with fistula to rectum, unchanged from prior  Lateral drain does not appear to be in the vicinity of the collection    Plan to consult IR to replace/reposition drain    Clark Alvarado MD

## 2019-12-13 NOTE — CHART NOTE - NSCHARTNOTEFT_GEN_A_CORE
L lateral drain not secured with stitch.    Procedure: drain stitch at bedside    Patient agreeable to procedure.    Site was prepped with alcohol pads. 8cc of 1% lidocaine injected at drain site. 0 silk suture drain stitch placed. Sterile technique was used.    Patient tolerated procedure.  Dressing applied.    Plan:  -switch 12Fr pigtail BASIM bulb to gravity bag  -monitor drain output and drain site    [facility]  [id number]

## 2019-12-13 NOTE — CONSULT NOTE ADULT - SUBJECTIVE AND OBJECTIVE BOX
Interventional Radiology    Patient is a 74y old  Female who presents with a chief complaint of displaced IR drain.      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 The patient said her pain began all of a sudden a week ago. She could not describe how the pain felt but she rated the pain a 7/10 that was non-radiating that comes and goes located at the LLQ. Over the course of the week the pain got worse, and today (12/12), upon awakening, the pain got significantly worse and is now sharp which prompted her to come to the ED.  The only thing that has alleviated the pain is Tylenol but moving around or touching the LLQ drain site makes the pain significantly worse. Otherwise the patient endorsed having a good appetite and her ostomy bag has been filling consistently. Furthermore the patient does not endorse having any fevers/chills, nausea or vomiting, SOB, or CP.      In the ER she was afebrile, non tachycardic with SBP 90s-100.  On exam she was obese, tender at site of most lateral IR drain in LLQ without diffuse tenderness, LLQ drain stitch noted to be out and leaking slightly around site.  Ostomy pink and patent.  Labs showed a WBC of 9, no signs of systemic acidosis.  CTAP was performed with rectal contrast which re-demonstrated the Lupe stump leak with contrast extravasating near the abdominal drains and into the fistula site.  Of not she has a picc in place but has been off antibiotics (ertapenam) for a week.    IR consulted for drain reposition    PAST MEDICAL & SURGICAL HISTORY:  Refusal of blood transfusions as patient is Faith  Patient is Faith  Vertigo  Atrial flutter  Diverticulitis  Cardiomyopathy  Kidney stone  Cardiac Pacemaker  HTN - Hypertension  Gout  Diabetes  Congestive Heart Failure  Asthma  Status post left hemicolectomy  S/P cholecystectomy  AICD (Automatic Cardioverter/Defibrillator) Present: inserted in Aug, 2008. Due for battery change in 1 month. ( [employer]) . Inserted by Dr Jadonath      Allergies:  Coreg (Other)  digoxin (Other; Short breath (Mild to Mod))  penicillins (Hives)    Intolerances:  metoprolol (Other)  Solu-Medrol (Other (Mild to Mod))      MEDICATIONS  (STANDING):  ALBUTerol    90 MICROgram(s) HFA Inhaler 1 Puff(s) Inhalation every 4 hours  ascorbic acid 500 milliGRAM(s) Oral daily  atorvastatin 20 milliGRAM(s) Oral at bedtime  budesonide 160 MICROgram(s)/formoterol 4.5 MICROgram(s) Inhaler 2 Puff(s) Inhalation two times a day  ciprofloxacin   IVPB      ciprofloxacin   IVPB 400 milliGRAM(s) IV Intermittent every 12 hours  dextrose 5% + sodium chloride 0.9%. 1000 milliLiter(s) (100 mL/Hr) IV Continuous <Continuous>  dextrose 5%. 1000 milliLiter(s) (50 mL/Hr) IV Continuous <Continuous>  dextrose 50% Injectable 12.5 Gram(s) IV Push once  dextrose 50% Injectable 25 Gram(s) IV Push once  dextrose 50% Injectable 25 Gram(s) IV Push once  folic acid 1 milliGRAM(s) Oral daily  furosemide   Oral Tab/Cap - Peds 40 milliGRAM(s) Oral two times a day  heparin  Injectable 5000 Unit(s) SubCutaneous every 8 hours  insulin lispro (HumaLOG) corrective regimen sliding scale   SubCutaneous every 6 hours  metroNIDAZOLE  IVPB      metroNIDAZOLE  IVPB 500 milliGRAM(s) IV Intermittent every 8 hours  montelukast 10 milliGRAM(s) Oral daily  pantoprazole    Tablet 40 milliGRAM(s) Oral before breakfast  spironolactone 25 milliGRAM(s) Oral daily    MEDICATIONS  (PRN):  acetaminophen   Tablet .. 650 milliGRAM(s) Oral every 6 hours PRN Mild Pain (1 - 3), Moderate Pain (4 - 6)  dextrose 40% Gel 15 Gram(s) Oral once PRN Blood Glucose LESS THAN 70 milliGRAM(s)/deciliter  glucagon  Injectable 1 milliGRAM(s) IntraMuscular once PRN Glucose LESS THAN 70 milligrams/deciliter    FAMILY HISTORY:  Family history of brain tumor    PHYSICAL EXAM:    Vital Signs Last 24 Hrs  T(C): 36.6 (13 Dec 2019 10:15), Max: 36.6 (12 Dec 2019 12:40)  T(F): 97.8 (13 Dec 2019 10:15), Max: 97.9 (12 Dec 2019 12:40)  HR: 80 (13 Dec 2019 10:15) (78 - 88)  BP: 110/64 (13 Dec 2019 10:15) (95/60 - 121/80)  RR: 18 (13 Dec 2019 10:15) (17 - 18)  SpO2: 97% (13 Dec 2019 10:15) (97% - 99%)    General:  Appears stated age, well-groomed, well-nourished, no distress  Abdomen:  Soft, non-tender, non-distended,   Extremities:  no calf tenderness/swelling b/l  Musculoskeletal:  Full ROM in all joints w/o swelling/tenderness/effusion  Neuro/Psych:  A &O x 3    LABS:                        8.4    8.02  )-----------( 252      ( 13 Dec 2019 07:04 )             27.0     12-13    138  |  104  |  12  ----------------------------<  94  3.2<L>   |  24  |  0.60    Ca    8.6      13 Dec 2019 07:04  Phos  4.2     12-13  Mg     1.7     12-13    TPro  6.4  /  Alb  3.0<L>  /  TBili  <0.1<L>  /  DBili  x   /  AST  13  /  ALT  8<L>  /  AlkPhos  67  12-12  PT/INR - ( 13 Dec 2019 01:44 )   PT: 18.7 sec;   INR: 1.60 ratio    PTT - ( 13 Dec 2019 07:04 )  PTT:32.4 sec      RADIOLOGY & ADDITIONAL STUDIES:  < from: CT Abdomen and Pelvis w/ IV Cont (12.12.19 @ 19:20) >  ABDOMINAL WALL: Large anterior abdominal wall hernia. Right lower   quadrant ostomy.: Collapsed midline abdominal wall collection containing   extraluminal contrast without change in size from prior CT 11/13/2019.   Percutaneous drainage catheter in place.  BONES: Degenerative changes.    IMPRESSION:     Active leak of contrast from a Delgado stump with a collapsed midline   anterior abdominal wall collection which is not significantly changed   from prior imaging 11/13/2019.    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 Most recently presents from rehab with left lower abdominal pain around the site of the drains. Most recent imaging demonstrates the drains are in place and not dislodged, fistula is noted therefore change from bulb to gravity bag.  Would not recommend IR reposition/manipulation of drain at this time. IR is deferring an intervention at this time.     -change drainage catheter to drainage bag instead of bulb.  -flush with 5cc of NS forward only; every other day  -recommend routine evaluation when 24hour output is <5cc  -if patient clinical status declines please re-consult  -case discussed with Dr. Silverio    Please call extension [phone] with any questions, concerns or issues regarding above.      Sue Crawford, [facility]  Spectra # [phone]

## 2019-12-13 NOTE — PROGRESS NOTE ADULT - ASSESSMENT
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 Ongoing sigmoid stump leak on CT with rectal contrast, drains in place, left drain dislodged with minor leakage around site    - NPO for possible tube check with IR  - Will hold off on antibiotics as patient not exhibiting signs of infection at this time, no collection on CTAP  -Continue home meds, will hold eliquis and ASA now  - monitor vitals    [facility]  [id number]

## 2019-12-13 NOTE — ED ADULT NURSE NOTE - NS ED NURSE RECORD ANOTHER HT AND WT
79 Y F with pmh of transient global ischemia (8-9 years ago), HTN, DLD presents to ED after being found by sister and daughter this morning with altered mental status. Stroke code was called, NIHSS 7, no TPA given, imaging so far negative.     # AMS - NIHSS 7 on admission. Rule out stroke vs. wernicke encephalopathy vs. other  - CT head/perfusion studies negative so far  - admit to stroke unit  - f/u MRI brain without óscar. (unable to perform yesterday because patient was agitated. Re-ordered)  - TTE: EF = 67%, demonstrates the presence of an intact intra atrial septum.  - EEG: generalized slowing   - f/u LDL, A1C  - f/u PT/rehab/OT.   - Continue asa and statin  - Neuro f/u    # HTN  - Losartan and Metoprolol tartrate    # Thiamine and folate deficiency secondary to EtOH abuse  - CIWA protocol PRN  - due to prolonged EtOH use  - Supplement    Diet - DASH  DVT PPx - SCD  GI ppx - not indicated  Dispo - From home, lives by herself, possible need social eval   FULL CODE Yes

## 2019-12-13 NOTE — PROGRESS NOTE ADULT - SUBJECTIVE AND OBJECTIVE BOX
GENERAL SURGERY PROGRESS NOTE    SUBJECTIVE  Patient seen and examined. No acute events overnight. NPO without nausea, vomiting, +/+ ostomy, voiding without issues, have been ambulating and out of bed. Denies fever, chills, SOB, chest pain.         OBJECTIVE    PHYSICAL EXAM  General: Appears well, NAD  CHEST: breathing comfortably  CV: appears well perfused  Abdomen: soft, TTP LLQ drain site, nondistended, no rebound or guarding, ostomy pink viable +/+  Extremities: Grossly symmetric    T(C): 36.4 (12-13-19 @ 00:35), Max: 36.6 (12-12-19 @ 12:40)  HR: 79 (12-13-19 @ 00:35) (76 - 88)  BP: 95/60 (12-13-19 @ 00:35) (93/58 - 121/80)  RR: 17 (12-13-19 @ 00:35) (17 - 18)  SpO2: 97% (12-13-19 @ 00:35) (97% - 99%)    12-12-19 @ 07:01  -  12-13-19 @ 04:23  --------------------------------------------------------  IN: 0 mL / OUT: 10 mL / NET: -10 mL        MEDICATIONS  ALBUTerol    90 MICROgram(s) HFA Inhaler 1 Puff(s) Inhalation every 4 hours  ascorbic acid 500 milliGRAM(s) Oral daily  atorvastatin 20 milliGRAM(s) Oral at bedtime  budesonide 160 MICROgram(s)/formoterol 4.5 MICROgram(s) Inhaler 2 Puff(s) Inhalation two times a day  dextrose 5% + sodium chloride 0.9%. 1000 milliLiter(s) IV Continuous <Continuous>  folic acid 1 milliGRAM(s) Oral daily  furosemide   Oral Tab/Cap - Peds 40 milliGRAM(s) Oral two times a day  heparin  Injectable 5000 Unit(s) SubCutaneous every 8 hours  montelukast 10 milliGRAM(s) Oral daily  pantoprazole    Tablet 40 milliGRAM(s) Oral before breakfast  spironolactone 25 milliGRAM(s) Oral daily      LABS                        9.0    9.45  )-----------( 267      ( 12 Dec 2019 14:59 )             28.3     12-12    140  |  104  |  15  ----------------------------<  67<L>  3.5   |  24  |  0.61    Ca    8.9      12 Dec 2019 14:59    TPro  6.4  /  Alb  3.0<L>  /  TBili  <0.1<L>  /  DBili  x   /  AST  13  /  ALT  8<L>  /  AlkPhos  67  12-12    PT/INR - ( 13 Dec 2019 01:44 )   PT: 18.7 sec;   INR: 1.60 ratio         PTT - ( 12 Dec 2019 14:59 )  PTT:31.8 sec      RADIOLOGY & ADDITIONAL STUDIES

## 2019-12-14 LAB
ANION GAP SERPL CALC-SCNC: 10 MMOL/L — SIGNIFICANT CHANGE UP (ref 5–17)
ANION GAP SERPL CALC-SCNC: 9 MMOL/L — SIGNIFICANT CHANGE UP (ref 5–17)
BUN SERPL-MCNC: 7 MG/DL — SIGNIFICANT CHANGE UP (ref 7–23)
BUN SERPL-MCNC: 8 MG/DL — SIGNIFICANT CHANGE UP (ref 7–23)
CALCIUM SERPL-MCNC: 8.2 MG/DL — LOW (ref 8.4–10.5)
CALCIUM SERPL-MCNC: 8.3 MG/DL — LOW (ref 8.4–10.5)
CHLORIDE SERPL-SCNC: 105 MMOL/L — SIGNIFICANT CHANGE UP (ref 96–108)
CHLORIDE SERPL-SCNC: 107 MMOL/L — SIGNIFICANT CHANGE UP (ref 96–108)
CO2 SERPL-SCNC: 21 MMOL/L — LOW (ref 22–31)
CO2 SERPL-SCNC: 21 MMOL/L — LOW (ref 22–31)
CREAT SERPL-MCNC: 0.67 MG/DL — SIGNIFICANT CHANGE UP (ref 0.5–1.3)
CREAT SERPL-MCNC: 0.7 MG/DL — SIGNIFICANT CHANGE UP (ref 0.5–1.3)
GLUCOSE BLDC GLUCOMTR-MCNC: 121 MG/DL — HIGH (ref 70–99)
GLUCOSE BLDC GLUCOMTR-MCNC: 129 MG/DL — HIGH (ref 70–99)
GLUCOSE BLDC GLUCOMTR-MCNC: 131 MG/DL — HIGH (ref 70–99)
GLUCOSE BLDC GLUCOMTR-MCNC: 154 MG/DL — HIGH (ref 70–99)
GLUCOSE SERPL-MCNC: 128 MG/DL — HIGH (ref 70–99)
GLUCOSE SERPL-MCNC: 135 MG/DL — HIGH (ref 70–99)
HCT VFR BLD CALC: 24.6 % — LOW (ref 34.5–45)
HGB BLD-MCNC: 8.3 G/DL — LOW (ref 11.5–15.5)
MAGNESIUM SERPL-MCNC: 1.6 MG/DL — SIGNIFICANT CHANGE UP (ref 1.6–2.6)
MAGNESIUM SERPL-MCNC: 1.8 MG/DL — SIGNIFICANT CHANGE UP (ref 1.6–2.6)
MCHC RBC-ENTMCNC: 33.7 GM/DL — SIGNIFICANT CHANGE UP (ref 32–36)
MCHC RBC-ENTMCNC: 35.5 PG — HIGH (ref 27–34)
MCV RBC AUTO: 105.1 FL — HIGH (ref 80–100)
NRBC # BLD: 0 /100 WBCS — SIGNIFICANT CHANGE UP (ref 0–0)
PHOSPHATE SERPL-MCNC: 3.6 MG/DL — SIGNIFICANT CHANGE UP (ref 2.5–4.5)
PHOSPHATE SERPL-MCNC: 3.8 MG/DL — SIGNIFICANT CHANGE UP (ref 2.5–4.5)
PLATELET # BLD AUTO: 232 K/UL — SIGNIFICANT CHANGE UP (ref 150–400)
POTASSIUM SERPL-MCNC: 3 MMOL/L — LOW (ref 3.5–5.3)
POTASSIUM SERPL-MCNC: 3 MMOL/L — LOW (ref 3.5–5.3)
POTASSIUM SERPL-SCNC: 3 MMOL/L — LOW (ref 3.5–5.3)
POTASSIUM SERPL-SCNC: 3 MMOL/L — LOW (ref 3.5–5.3)
RBC # BLD: 2.34 M/UL — LOW (ref 3.8–5.2)
RBC # FLD: 13.5 % — SIGNIFICANT CHANGE UP (ref 10.3–14.5)
SODIUM SERPL-SCNC: 136 MMOL/L — SIGNIFICANT CHANGE UP (ref 135–145)
SODIUM SERPL-SCNC: 137 MMOL/L — SIGNIFICANT CHANGE UP (ref 135–145)
WBC # BLD: 10.05 K/UL — SIGNIFICANT CHANGE UP (ref 3.8–10.5)
WBC # FLD AUTO: 10.05 K/UL — SIGNIFICANT CHANGE UP (ref 3.8–10.5)

## 2019-12-14 RX ORDER — MAGNESIUM OXIDE 400 MG ORAL TABLET 241.3 MG
400 TABLET ORAL
Refills: 0 | Status: DISCONTINUED | OUTPATIENT
Start: 2019-12-14 | End: 2019-12-19

## 2019-12-14 RX ORDER — IPRATROPIUM/ALBUTEROL SULFATE 18-103MCG
3 AEROSOL WITH ADAPTER (GRAM) INHALATION ONCE
Refills: 0 | Status: COMPLETED | OUTPATIENT
Start: 2019-12-14 | End: 2019-12-14

## 2019-12-14 RX ORDER — SODIUM CHLORIDE 9 MG/ML
10 INJECTION INTRAMUSCULAR; INTRAVENOUS; SUBCUTANEOUS
Refills: 0 | Status: DISCONTINUED | OUTPATIENT
Start: 2019-12-14 | End: 2019-12-19

## 2019-12-14 RX ORDER — CHLORHEXIDINE GLUCONATE 213 G/1000ML
1 SOLUTION TOPICAL
Refills: 0 | Status: DISCONTINUED | OUTPATIENT
Start: 2019-12-14 | End: 2019-12-19

## 2019-12-14 RX ORDER — POTASSIUM CHLORIDE 20 MEQ
40 PACKET (EA) ORAL EVERY 4 HOURS
Refills: 0 | Status: COMPLETED | OUTPATIENT
Start: 2019-12-14 | End: 2019-12-15

## 2019-12-14 RX ORDER — MAGNESIUM SULFATE 500 MG/ML
2 VIAL (ML) INJECTION ONCE
Refills: 0 | Status: COMPLETED | OUTPATIENT
Start: 2019-12-14 | End: 2019-12-15

## 2019-12-14 RX ORDER — GUAIFENESIN/DEXTROMETHORPHAN 600MG-30MG
10 TABLET, EXTENDED RELEASE 12 HR ORAL EVERY 6 HOURS
Refills: 0 | Status: DISCONTINUED | OUTPATIENT
Start: 2019-12-14 | End: 2019-12-19

## 2019-12-14 RX ORDER — POTASSIUM CHLORIDE 20 MEQ
10 PACKET (EA) ORAL
Refills: 0 | Status: COMPLETED | OUTPATIENT
Start: 2019-12-14 | End: 2019-12-14

## 2019-12-14 RX ORDER — SODIUM,POTASSIUM PHOSPHATES 278-250MG
1 POWDER IN PACKET (EA) ORAL
Refills: 0 | Status: COMPLETED | OUTPATIENT
Start: 2019-12-14 | End: 2019-12-15

## 2019-12-14 RX ADMIN — Medication 650 MILLIGRAM(S): at 13:30

## 2019-12-14 RX ADMIN — HEPARIN SODIUM 5000 UNIT(S): 5000 INJECTION INTRAVENOUS; SUBCUTANEOUS at 15:40

## 2019-12-14 RX ADMIN — Medication 500 MILLIGRAM(S): at 12:44

## 2019-12-14 RX ADMIN — Medication 1 PACKET(S): at 18:34

## 2019-12-14 RX ADMIN — Medication 3 MILLILITER(S): at 14:55

## 2019-12-14 RX ADMIN — BUDESONIDE AND FORMOTEROL FUMARATE DIHYDRATE 2 PUFF(S): 160; 4.5 AEROSOL RESPIRATORY (INHALATION) at 20:48

## 2019-12-14 RX ADMIN — Medication 1 MILLIGRAM(S): at 12:44

## 2019-12-14 RX ADMIN — Medication 100 MILLIGRAM(S): at 15:40

## 2019-12-14 RX ADMIN — SPIRONOLACTONE 25 MILLIGRAM(S): 25 TABLET, FILM COATED ORAL at 05:41

## 2019-12-14 RX ADMIN — MONTELUKAST 10 MILLIGRAM(S): 4 TABLET, CHEWABLE ORAL at 12:44

## 2019-12-14 RX ADMIN — Medication 100 MILLIEQUIVALENT(S): at 20:37

## 2019-12-14 RX ADMIN — Medication 10 MILLILITER(S): at 22:50

## 2019-12-14 RX ADMIN — Medication 650 MILLIGRAM(S): at 12:43

## 2019-12-14 RX ADMIN — Medication 1 PACKET(S): at 21:40

## 2019-12-14 RX ADMIN — MAGNESIUM OXIDE 400 MG ORAL TABLET 400 MILLIGRAM(S): 241.3 TABLET ORAL at 15:39

## 2019-12-14 RX ADMIN — Medication 1: at 14:52

## 2019-12-14 RX ADMIN — ATORVASTATIN CALCIUM 20 MILLIGRAM(S): 80 TABLET, FILM COATED ORAL at 21:40

## 2019-12-14 RX ADMIN — MAGNESIUM OXIDE 400 MG ORAL TABLET 400 MILLIGRAM(S): 241.3 TABLET ORAL at 10:51

## 2019-12-14 RX ADMIN — Medication 100 MILLIEQUIVALENT(S): at 21:41

## 2019-12-14 RX ADMIN — Medication 1 PACKET(S): at 12:44

## 2019-12-14 RX ADMIN — ALBUTEROL 1 PUFF(S): 90 AEROSOL, METERED ORAL at 14:25

## 2019-12-14 RX ADMIN — Medication 200 MILLIGRAM(S): at 18:07

## 2019-12-14 RX ADMIN — Medication 100 MILLIEQUIVALENT(S): at 22:50

## 2019-12-14 RX ADMIN — MAGNESIUM OXIDE 400 MG ORAL TABLET 400 MILLIGRAM(S): 241.3 TABLET ORAL at 18:34

## 2019-12-14 RX ADMIN — PANTOPRAZOLE SODIUM 40 MILLIGRAM(S): 20 TABLET, DELAYED RELEASE ORAL at 05:41

## 2019-12-14 RX ADMIN — Medication 40 MILLIEQUIVALENT(S): at 21:40

## 2019-12-14 RX ADMIN — HEPARIN SODIUM 5000 UNIT(S): 5000 INJECTION INTRAVENOUS; SUBCUTANEOUS at 21:39

## 2019-12-14 RX ADMIN — ALBUTEROL 1 PUFF(S): 90 AEROSOL, METERED ORAL at 21:40

## 2019-12-14 RX ADMIN — SODIUM CHLORIDE 100 MILLILITER(S): 9 INJECTION, SOLUTION INTRAVENOUS at 15:42

## 2019-12-14 RX ADMIN — HEPARIN SODIUM 5000 UNIT(S): 5000 INJECTION INTRAVENOUS; SUBCUTANEOUS at 05:41

## 2019-12-14 RX ADMIN — Medication 100 MILLIGRAM(S): at 06:31

## 2019-12-14 RX ADMIN — Medication 200 MILLIGRAM(S): at 05:25

## 2019-12-14 NOTE — PROGRESS NOTE ADULT - SUBJECTIVE AND OBJECTIVE BOX
GENERAL SURGERY PROGRESS NOTE    SUBJECTIVE  Patient seen and examined. No acute events overnight. Reports tolerating diet without nausea, vomiting, voiding without issues primafit. Denies fever, chills, SOB, chest pain.     IR 12/13 flush 5cc NS every other day forward direction only, switch to gravity bag  pt's daughter requesting PT for patient as patient has been deconditioned last several months    OBJECTIVE    PHYSICAL EXAM  General: Appears well, NAD  CHEST: breathing comfortably  CV: appears well perfused  Abdomen: soft, nontender, nondistended, no rebound or guarding, fistula c/d/i, ostomy +/+, gravity bag yellow mucous output   Extremities: Grossly symmetric    T(C): 36.9 (12-14-19 @ 05:05), Max: 37.1 (12-13-19 @ 21:45)  HR: 80 (12-14-19 @ 05:05) (63 - 90)  BP: 106/63 (12-14-19 @ 05:05) (95/56 - 118/70)  RR: 17 (12-14-19 @ 05:05) (17 - 18)  SpO2: 96% (12-14-19 @ 05:05) (95% - 99%)    12-12-19 @ 07:01  -  12-13-19 @ 07:00  --------------------------------------------------------  IN: 950 mL / OUT: 190 mL / NET: 760 mL    12-13-19 @ 07:01  -  12-14-19 @ 05:43  --------------------------------------------------------  IN: 2260 mL / OUT: 1280 mL / NET: 980 mL        MEDICATIONS  acetaminophen   Tablet .. 650 milliGRAM(s) Oral every 6 hours PRN  ALBUTerol    90 MICROgram(s) HFA Inhaler 1 Puff(s) Inhalation every 4 hours  ascorbic acid 500 milliGRAM(s) Oral daily  aspirin  chewable 81 milliGRAM(s) Oral daily  atorvastatin 20 milliGRAM(s) Oral at bedtime  budesonide 160 MICROgram(s)/formoterol 4.5 MICROgram(s) Inhaler 2 Puff(s) Inhalation two times a day  ciprofloxacin   IVPB      ciprofloxacin   IVPB 400 milliGRAM(s) IV Intermittent every 12 hours  dextrose 40% Gel 15 Gram(s) Oral once PRN  dextrose 5% + sodium chloride 0.9%. 1000 milliLiter(s) IV Continuous <Continuous>  dextrose 5%. 1000 milliLiter(s) IV Continuous <Continuous>  dextrose 50% Injectable 12.5 Gram(s) IV Push once  dextrose 50% Injectable 25 Gram(s) IV Push once  dextrose 50% Injectable 25 Gram(s) IV Push once  enalapril 10 milliGRAM(s) Oral two times a day  folic acid 1 milliGRAM(s) Oral daily  furosemide   Oral Tab/Cap - Peds 40 milliGRAM(s) Oral two times a day  glucagon  Injectable 1 milliGRAM(s) IntraMuscular once PRN  heparin  Injectable 5000 Unit(s) SubCutaneous every 8 hours  insulin lispro (HumaLOG) corrective regimen sliding scale   SubCutaneous three times a day before meals  insulin lispro (HumaLOG) corrective regimen sliding scale   SubCutaneous at bedtime  metroNIDAZOLE  IVPB      metroNIDAZOLE  IVPB 500 milliGRAM(s) IV Intermittent every 8 hours  montelukast 10 milliGRAM(s) Oral daily  pantoprazole    Tablet 40 milliGRAM(s) Oral before breakfast  spironolactone 25 milliGRAM(s) Oral daily      LABS                        8.4    8.02  )-----------( 252      ( 13 Dec 2019 07:04 )             27.0     12-13    138  |  104  |  12  ----------------------------<  94  3.2<L>   |  24  |  0.60    Ca    8.6      13 Dec 2019 07:04  Phos  4.2     12-13  Mg     1.7     12-13    TPro  6.4  /  Alb  3.0<L>  /  TBili  <0.1<L>  /  DBili  x   /  AST  13  /  ALT  8<L>  /  AlkPhos  67  12-12    PT/INR - ( 13 Dec 2019 01:44 )   PT: 18.7 sec;   INR: 1.60 ratio         PTT - ( 13 Dec 2019 07:04 )  PTT:32.4 sec      RADIOLOGY & ADDITIONAL STUDIES

## 2019-12-14 NOTE — PROGRESS NOTE ADULT - ASSESSMENT
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 Ongoing sigmoid stump leak on CT with rectal contrast, drains in place, left drain dislodged with minor leakage around site    - reg diet, ISS  - c/w abx  -Continue home meds, will hold eliquis for AF now, holding ASA  - monitor vitals  - PT c/s  - 5cc NS forward flush every other day    [facility]  [id number]

## 2019-12-14 NOTE — PROGRESS NOTE ADULT - ATTENDING COMMENTS
I saw and examined the pt and discussed the tx plan with the House Staff. I agree with the exam and plan as documented in the surgery resident's note from today.  Light sanguinous output from fistula, will monitor.  Tracy Jay MD

## 2019-12-15 LAB
ANION GAP SERPL CALC-SCNC: 11 MMOL/L — SIGNIFICANT CHANGE UP (ref 5–17)
APTT BLD: 33.1 SEC — SIGNIFICANT CHANGE UP (ref 27.5–36.3)
BUN SERPL-MCNC: 5 MG/DL — LOW (ref 7–23)
CALCIUM SERPL-MCNC: 8.5 MG/DL — SIGNIFICANT CHANGE UP (ref 8.4–10.5)
CHLORIDE SERPL-SCNC: 106 MMOL/L — SIGNIFICANT CHANGE UP (ref 96–108)
CO2 SERPL-SCNC: 19 MMOL/L — LOW (ref 22–31)
CREAT SERPL-MCNC: 0.64 MG/DL — SIGNIFICANT CHANGE UP (ref 0.5–1.3)
GLUCOSE BLDC GLUCOMTR-MCNC: 145 MG/DL — HIGH (ref 70–99)
GLUCOSE BLDC GLUCOMTR-MCNC: 150 MG/DL — HIGH (ref 70–99)
GLUCOSE BLDC GLUCOMTR-MCNC: 182 MG/DL — HIGH (ref 70–99)
GLUCOSE BLDC GLUCOMTR-MCNC: 98 MG/DL — SIGNIFICANT CHANGE UP (ref 70–99)
GLUCOSE SERPL-MCNC: 104 MG/DL — HIGH (ref 70–99)
HCT VFR BLD CALC: 25.4 % — LOW (ref 34.5–45)
HGB BLD-MCNC: 8 G/DL — LOW (ref 11.5–15.5)
INR BLD: 1.2 RATIO — HIGH (ref 0.88–1.16)
MAGNESIUM SERPL-MCNC: 2.1 MG/DL — SIGNIFICANT CHANGE UP (ref 1.6–2.6)
MCHC RBC-ENTMCNC: 31.5 GM/DL — LOW (ref 32–36)
MCHC RBC-ENTMCNC: 33.2 PG — SIGNIFICANT CHANGE UP (ref 27–34)
MCV RBC AUTO: 105.4 FL — HIGH (ref 80–100)
NRBC # BLD: 0 /100 WBCS — SIGNIFICANT CHANGE UP (ref 0–0)
PHOSPHATE SERPL-MCNC: 3.3 MG/DL — SIGNIFICANT CHANGE UP (ref 2.5–4.5)
PLATELET # BLD AUTO: 230 K/UL — SIGNIFICANT CHANGE UP (ref 150–400)
POTASSIUM SERPL-MCNC: 4.1 MMOL/L — SIGNIFICANT CHANGE UP (ref 3.5–5.3)
POTASSIUM SERPL-SCNC: 4.1 MMOL/L — SIGNIFICANT CHANGE UP (ref 3.5–5.3)
PROTHROM AB SERPL-ACNC: 13.9 SEC — HIGH (ref 10–12.9)
RBC # BLD: 2.41 M/UL — LOW (ref 3.8–5.2)
RBC # FLD: 13.4 % — SIGNIFICANT CHANGE UP (ref 10.3–14.5)
SODIUM SERPL-SCNC: 136 MMOL/L — SIGNIFICANT CHANGE UP (ref 135–145)
WBC # BLD: 9.18 K/UL — SIGNIFICANT CHANGE UP (ref 3.8–10.5)
WBC # FLD AUTO: 9.18 K/UL — SIGNIFICANT CHANGE UP (ref 3.8–10.5)

## 2019-12-15 RX ORDER — DEXTROSE MONOHYDRATE, SODIUM CHLORIDE, AND POTASSIUM CHLORIDE 50; .745; 4.5 G/1000ML; G/1000ML; G/1000ML
1000 INJECTION, SOLUTION INTRAVENOUS
Refills: 0 | Status: DISCONTINUED | OUTPATIENT
Start: 2019-12-15 | End: 2019-12-19

## 2019-12-15 RX ORDER — DEXTROSE MONOHYDRATE, SODIUM CHLORIDE, AND POTASSIUM CHLORIDE 50; .745; 4.5 G/1000ML; G/1000ML; G/1000ML
1000 INJECTION, SOLUTION INTRAVENOUS
Refills: 0 | Status: COMPLETED | OUTPATIENT
Start: 2019-12-15 | End: 2020-11-12

## 2019-12-15 RX ADMIN — Medication 1 MILLIGRAM(S): at 13:00

## 2019-12-15 RX ADMIN — HEPARIN SODIUM 5000 UNIT(S): 5000 INJECTION INTRAVENOUS; SUBCUTANEOUS at 22:34

## 2019-12-15 RX ADMIN — MAGNESIUM OXIDE 400 MG ORAL TABLET 400 MILLIGRAM(S): 241.3 TABLET ORAL at 09:17

## 2019-12-15 RX ADMIN — SPIRONOLACTONE 25 MILLIGRAM(S): 25 TABLET, FILM COATED ORAL at 06:13

## 2019-12-15 RX ADMIN — PANTOPRAZOLE SODIUM 40 MILLIGRAM(S): 20 TABLET, DELAYED RELEASE ORAL at 06:12

## 2019-12-15 RX ADMIN — Medication 200 MILLIGRAM(S): at 06:13

## 2019-12-15 RX ADMIN — ATORVASTATIN CALCIUM 20 MILLIGRAM(S): 80 TABLET, FILM COATED ORAL at 22:34

## 2019-12-15 RX ADMIN — MONTELUKAST 10 MILLIGRAM(S): 4 TABLET, CHEWABLE ORAL at 13:00

## 2019-12-15 RX ADMIN — CHLORHEXIDINE GLUCONATE 1 APPLICATION(S): 213 SOLUTION TOPICAL at 06:15

## 2019-12-15 RX ADMIN — MAGNESIUM OXIDE 400 MG ORAL TABLET 400 MILLIGRAM(S): 241.3 TABLET ORAL at 13:00

## 2019-12-15 RX ADMIN — Medication 650 MILLIGRAM(S): at 13:30

## 2019-12-15 RX ADMIN — BUDESONIDE AND FORMOTEROL FUMARATE DIHYDRATE 2 PUFF(S): 160; 4.5 AEROSOL RESPIRATORY (INHALATION) at 17:24

## 2019-12-15 RX ADMIN — ALBUTEROL 1 PUFF(S): 90 AEROSOL, METERED ORAL at 01:14

## 2019-12-15 RX ADMIN — BUDESONIDE AND FORMOTEROL FUMARATE DIHYDRATE 2 PUFF(S): 160; 4.5 AEROSOL RESPIRATORY (INHALATION) at 06:14

## 2019-12-15 RX ADMIN — Medication 100 MILLIGRAM(S): at 18:21

## 2019-12-15 RX ADMIN — ALBUTEROL 1 PUFF(S): 90 AEROSOL, METERED ORAL at 09:17

## 2019-12-15 RX ADMIN — Medication 200 MILLIGRAM(S): at 17:23

## 2019-12-15 RX ADMIN — Medication 650 MILLIGRAM(S): at 17:24

## 2019-12-15 RX ADMIN — Medication 100 MILLIGRAM(S): at 09:17

## 2019-12-15 RX ADMIN — Medication 650 MILLIGRAM(S): at 13:00

## 2019-12-15 RX ADMIN — ALBUTEROL 1 PUFF(S): 90 AEROSOL, METERED ORAL at 06:14

## 2019-12-15 RX ADMIN — HEPARIN SODIUM 5000 UNIT(S): 5000 INJECTION INTRAVENOUS; SUBCUTANEOUS at 06:12

## 2019-12-15 RX ADMIN — HEPARIN SODIUM 5000 UNIT(S): 5000 INJECTION INTRAVENOUS; SUBCUTANEOUS at 13:02

## 2019-12-15 RX ADMIN — ALBUTEROL 1 PUFF(S): 90 AEROSOL, METERED ORAL at 22:34

## 2019-12-15 RX ADMIN — Medication 1 PACKET(S): at 09:17

## 2019-12-15 RX ADMIN — MAGNESIUM OXIDE 400 MG ORAL TABLET 400 MILLIGRAM(S): 241.3 TABLET ORAL at 17:23

## 2019-12-15 RX ADMIN — Medication 40 MILLIEQUIVALENT(S): at 01:14

## 2019-12-15 RX ADMIN — Medication 650 MILLIGRAM(S): at 17:54

## 2019-12-15 RX ADMIN — Medication 500 MILLIGRAM(S): at 13:00

## 2019-12-15 RX ADMIN — Medication 100 MILLIGRAM(S): at 01:14

## 2019-12-15 RX ADMIN — Medication 10 MILLILITER(S): at 13:05

## 2019-12-15 RX ADMIN — Medication 50 GRAM(S): at 00:09

## 2019-12-15 RX ADMIN — Medication 10 MILLILITER(S): at 22:34

## 2019-12-15 RX ADMIN — Medication 1: at 13:02

## 2019-12-15 NOTE — CHART NOTE - NSCHARTNOTEFT_GEN_A_CORE
Patient c/o L lateral drain site pain    Patient seen and examined at bedside  says pain is superficial at the skin site, denies back pack or deep abdominal pain sensation  tolerated lunch well, denies n/v, +/+ ostomy    Exam:  increased TTP L lateral drain site, small amount of mucous drainage at drain site, empty gravity bag  no erythema or induration or fluctuance at drain site  ostomy +/+, fistula with 5cc sanguinous mucous drainage    patient screaming in pain while changing dressing, stop screaming when distracted with questions and responds calmly  patient does not want any other pain medication besides tylenol, wants to try hot packs instead of taking extra tylenol    per IR 12/15 over the phone, recommend CT noncontrast to evaluate if collection reaccumulated after switching from BASIM to gravity bag    Plan:  -monitor drain site for drainage  -tylenol prn q6h, hot packs prn  -consider CT noncontrast in AM  -NPO/IVF @ [facility], f/u CBC BMP coags in     [facility]  [id number]

## 2019-12-15 NOTE — PROGRESS NOTE ADULT - SUBJECTIVE AND OBJECTIVE BOX
GENERAL SURGERY PROGRESS NOTE    SUBJECTIVE  Patient seen and examined. No acute events overnight. Reports tolerating diet without nausea, vomiting, +/+ ostomy, voiding without issues. Denies fever, chills, SOB, chest pain.         OBJECTIVE    PHYSICAL EXAM  General: Appears well, NAD  CHEST: breathing comfortably  CV: appears well perfused  Abdomen: soft, nontender, nondistended, no rebound or guarding, ostomy pink +/+, fistula no output, L drain no output  Extremities: Grossly symmetric    T(C): 36.9 (12-15-19 @ 01:21), Max: 36.9 (12-15-19 @ 01:21)  HR: 85 (12-15-19 @ 01:21) (77 - 85)  BP: 109/67 (12-15-19 @ 01:21) (102/64 - 111/68)  RR: 18 (12-15-19 @ 01:21) (16 - 18)  SpO2: 97% (12-15-19 @ 01:21) (97% - 99%)    12-13-19 @ 07:01  -  12-14-19 @ 07:00  --------------------------------------------------------  IN: 3180 mL / OUT: 1380 mL / NET: 1800 mL    12-14-19 @ 07:01  -  12-15-19 @ 05:37  --------------------------------------------------------  IN: 1500 mL / OUT: 770 mL / NET: 730 mL        MEDICATIONS  acetaminophen   Tablet .. 650 milliGRAM(s) Oral every 6 hours PRN  ALBUTerol    90 MICROgram(s) HFA Inhaler 1 Puff(s) Inhalation every 4 hours  ascorbic acid 500 milliGRAM(s) Oral daily  atorvastatin 20 milliGRAM(s) Oral at bedtime  budesonide 160 MICROgram(s)/formoterol 4.5 MICROgram(s) Inhaler 2 Puff(s) Inhalation two times a day  chlorhexidine 4% Liquid 1 Application(s) Topical <User Schedule>  ciprofloxacin   IVPB      ciprofloxacin   IVPB 400 milliGRAM(s) IV Intermittent every 12 hours  dextrose 40% Gel 15 Gram(s) Oral once PRN  dextrose 5%. 1000 milliLiter(s) IV Continuous <Continuous>  dextrose 50% Injectable 12.5 Gram(s) IV Push once  dextrose 50% Injectable 25 Gram(s) IV Push once  dextrose 50% Injectable 25 Gram(s) IV Push once  enalapril 10 milliGRAM(s) Oral two times a day  folic acid 1 milliGRAM(s) Oral daily  furosemide   Oral Tab/Cap - Peds 40 milliGRAM(s) Oral two times a day  glucagon  Injectable 1 milliGRAM(s) IntraMuscular once PRN  guaifenesin/dextromethorphan  Syrup 10 milliLiter(s) Oral every 6 hours PRN  heparin  Injectable 5000 Unit(s) SubCutaneous every 8 hours  insulin lispro (HumaLOG) corrective regimen sliding scale   SubCutaneous three times a day before meals  insulin lispro (HumaLOG) corrective regimen sliding scale   SubCutaneous at bedtime  magnesium oxide 400 milliGRAM(s) Oral three times a day with meals  metroNIDAZOLE  IVPB      metroNIDAZOLE  IVPB 500 milliGRAM(s) IV Intermittent every 8 hours  montelukast 10 milliGRAM(s) Oral daily  pantoprazole    Tablet 40 milliGRAM(s) Oral before breakfast  potassium phosphate / sodium phosphate powder 1 Packet(s) Oral four times a day with meals  sodium chloride 0.9% lock flush 10 milliLiter(s) IV Push every 1 hour PRN  spironolactone 25 milliGRAM(s) Oral daily      LABS                        8.3    10.05 )-----------( 232      ( 14 Dec 2019 07:21 )             24.6     12-14    137  |  107  |  7   ----------------------------<  135<H>  3.0<L>   |  21<L>  |  0.67    Ca    8.2<L>      14 Dec 2019 18:14  Phos  3.8     12-14  Mg     1.6     12-14      PTT - ( 13 Dec 2019 07:04 )  PTT:32.4 sec      RADIOLOGY & ADDITIONAL STUDIES

## 2019-12-15 NOTE — PHYSICAL THERAPY INITIAL EVALUATION ADULT - PERTINENT HX OF CURRENT PROBLEM, REHAB EVAL
74 y/oF admitted 12/12 presenting from rehab with L lower abdominal pain around site of drains. 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 CT re-demonstrated the Lupe stump leak with contrast extravasating near the abdominal drains and into the fistula site. (cont below)

## 2019-12-15 NOTE — PROGRESS NOTE ADULT - ASSESSMENT
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 Ongoing sigmoid stump leak on CT with rectal contrast, drains in place, left drain dislodged with minor leakage around site    - consistent carb diet, ISS  - c/w abx  -Continue home meds, will hold eliquis for AF now, holding ASA  - monitor vitals  - PT c/s  - 5cc NS forward flush every other day per IR    [facility]  [id number]

## 2019-12-15 NOTE — PROGRESS NOTE ADULT - ATTENDING COMMENTS
I saw and examined the pt and discussed the tx plan with the House Staff. I agree with the exam and plan as documented in the surgery resident's note from today.  Bloody output from fistula diminishing, appears d/t granulation tissue and minor trauma. Continue to monitor.   Tracy Jay MD

## 2019-12-15 NOTE — PHYSICAL THERAPY INITIAL EVALUATION ADULT - ADDITIONAL COMMENTS
As per H&P, left drain dislodged with minor leakage around site. Drain stitch performed by IR at bedside on 12/13. As per H&P, left drain dislodged with minor leakage around site. Drain stitch performed by IR at bedside on 12/13. Prior level of function- Pt was in rehab. Stating that with PT she was able to transfer from wheelchair to a bed with rolling walker and supervision. States nursing staff used a lift to help her out of bed.

## 2019-12-16 LAB
ANION GAP SERPL CALC-SCNC: 8 MMOL/L — SIGNIFICANT CHANGE UP (ref 5–17)
APTT BLD: 33 SEC — SIGNIFICANT CHANGE UP (ref 27.5–36.3)
BUN SERPL-MCNC: 4 MG/DL — LOW (ref 7–23)
CALCIUM SERPL-MCNC: 9.2 MG/DL — SIGNIFICANT CHANGE UP (ref 8.4–10.5)
CHLORIDE SERPL-SCNC: 106 MMOL/L — SIGNIFICANT CHANGE UP (ref 96–108)
CO2 SERPL-SCNC: 23 MMOL/L — SIGNIFICANT CHANGE UP (ref 22–31)
CREAT SERPL-MCNC: 0.66 MG/DL — SIGNIFICANT CHANGE UP (ref 0.5–1.3)
GLUCOSE BLDC GLUCOMTR-MCNC: 107 MG/DL — HIGH (ref 70–99)
GLUCOSE BLDC GLUCOMTR-MCNC: 125 MG/DL — HIGH (ref 70–99)
GLUCOSE BLDC GLUCOMTR-MCNC: 141 MG/DL — HIGH (ref 70–99)
GLUCOSE SERPL-MCNC: 118 MG/DL — HIGH (ref 70–99)
HCT VFR BLD CALC: 25.8 % — LOW (ref 34.5–45)
HGB BLD-MCNC: 8.1 G/DL — LOW (ref 11.5–15.5)
INR BLD: 1.31 RATIO — HIGH (ref 0.88–1.16)
MAGNESIUM SERPL-MCNC: 1.8 MG/DL — SIGNIFICANT CHANGE UP (ref 1.6–2.6)
MCHC RBC-ENTMCNC: 31.4 GM/DL — LOW (ref 32–36)
MCHC RBC-ENTMCNC: 33.3 PG — SIGNIFICANT CHANGE UP (ref 27–34)
MCV RBC AUTO: 106.2 FL — HIGH (ref 80–100)
NRBC # BLD: 0 /100 WBCS — SIGNIFICANT CHANGE UP (ref 0–0)
PHOSPHATE SERPL-MCNC: 3.6 MG/DL — SIGNIFICANT CHANGE UP (ref 2.5–4.5)
PLATELET # BLD AUTO: 236 K/UL — SIGNIFICANT CHANGE UP (ref 150–400)
POTASSIUM SERPL-MCNC: 4.1 MMOL/L — SIGNIFICANT CHANGE UP (ref 3.5–5.3)
POTASSIUM SERPL-SCNC: 4.1 MMOL/L — SIGNIFICANT CHANGE UP (ref 3.5–5.3)
PROTHROM AB SERPL-ACNC: 15 SEC — HIGH (ref 10–12.9)
RBC # BLD: 2.43 M/UL — LOW (ref 3.8–5.2)
RBC # FLD: 13.7 % — SIGNIFICANT CHANGE UP (ref 10.3–14.5)
SODIUM SERPL-SCNC: 137 MMOL/L — SIGNIFICANT CHANGE UP (ref 135–145)
WBC # BLD: 8.1 K/UL — SIGNIFICANT CHANGE UP (ref 3.8–10.5)
WBC # FLD AUTO: 8.1 K/UL — SIGNIFICANT CHANGE UP (ref 3.8–10.5)

## 2019-12-16 PROCEDURE — 75984 XRAY CONTROL CATHETER CHANGE: CPT | Mod: 26

## 2019-12-16 PROCEDURE — 49423 EXCHANGE DRAINAGE CATHETER: CPT

## 2019-12-16 RX ORDER — MAGNESIUM SULFATE 500 MG/ML
2 VIAL (ML) INJECTION DAILY
Refills: 0 | Status: DISCONTINUED | OUTPATIENT
Start: 2019-12-16 | End: 2019-12-19

## 2019-12-16 RX ORDER — INSULIN LISPRO 100/ML
VIAL (ML) SUBCUTANEOUS
Refills: 0 | Status: DISCONTINUED | OUTPATIENT
Start: 2019-12-16 | End: 2019-12-19

## 2019-12-16 RX ORDER — ENOXAPARIN SODIUM 100 MG/ML
40 INJECTION SUBCUTANEOUS DAILY
Refills: 0 | Status: DISCONTINUED | OUTPATIENT
Start: 2019-12-16 | End: 2019-12-19

## 2019-12-16 RX ORDER — INSULIN LISPRO 100/ML
VIAL (ML) SUBCUTANEOUS EVERY 6 HOURS
Refills: 0 | Status: DISCONTINUED | OUTPATIENT
Start: 2019-12-16 | End: 2019-12-16

## 2019-12-16 RX ADMIN — BUDESONIDE AND FORMOTEROL FUMARATE DIHYDRATE 2 PUFF(S): 160; 4.5 AEROSOL RESPIRATORY (INHALATION) at 05:15

## 2019-12-16 RX ADMIN — Medication 200 MILLIGRAM(S): at 05:14

## 2019-12-16 RX ADMIN — Medication 100 MILLIGRAM(S): at 09:25

## 2019-12-16 RX ADMIN — CHLORHEXIDINE GLUCONATE 1 APPLICATION(S): 213 SOLUTION TOPICAL at 13:55

## 2019-12-16 RX ADMIN — Medication 1 MILLIGRAM(S): at 13:49

## 2019-12-16 RX ADMIN — ALBUTEROL 1 PUFF(S): 90 AEROSOL, METERED ORAL at 09:25

## 2019-12-16 RX ADMIN — ALBUTEROL 1 PUFF(S): 90 AEROSOL, METERED ORAL at 05:15

## 2019-12-16 RX ADMIN — ALBUTEROL 1 PUFF(S): 90 AEROSOL, METERED ORAL at 13:50

## 2019-12-16 RX ADMIN — ATORVASTATIN CALCIUM 20 MILLIGRAM(S): 80 TABLET, FILM COATED ORAL at 21:40

## 2019-12-16 RX ADMIN — Medication 650 MILLIGRAM(S): at 00:57

## 2019-12-16 RX ADMIN — MAGNESIUM OXIDE 400 MG ORAL TABLET 400 MILLIGRAM(S): 241.3 TABLET ORAL at 19:43

## 2019-12-16 RX ADMIN — ALBUTEROL 1 PUFF(S): 90 AEROSOL, METERED ORAL at 21:40

## 2019-12-16 RX ADMIN — Medication 100 MILLIGRAM(S): at 00:57

## 2019-12-16 RX ADMIN — Medication 500 MILLIGRAM(S): at 13:47

## 2019-12-16 RX ADMIN — Medication 40 MILLIGRAM(S): at 05:14

## 2019-12-16 RX ADMIN — SPIRONOLACTONE 25 MILLIGRAM(S): 25 TABLET, FILM COATED ORAL at 05:14

## 2019-12-16 RX ADMIN — Medication 50 GRAM(S): at 13:47

## 2019-12-16 RX ADMIN — Medication 10 MILLIGRAM(S): at 05:14

## 2019-12-16 RX ADMIN — MAGNESIUM OXIDE 400 MG ORAL TABLET 400 MILLIGRAM(S): 241.3 TABLET ORAL at 13:48

## 2019-12-16 RX ADMIN — BUDESONIDE AND FORMOTEROL FUMARATE DIHYDRATE 2 PUFF(S): 160; 4.5 AEROSOL RESPIRATORY (INHALATION) at 21:41

## 2019-12-16 RX ADMIN — MONTELUKAST 10 MILLIGRAM(S): 4 TABLET, CHEWABLE ORAL at 13:48

## 2019-12-16 NOTE — PROGRESS NOTE ADULT - SUBJECTIVE AND OBJECTIVE BOX
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 Ongoing sigmoid stump leak on CT with rectal contrast, drains in place. Drain has had minimal output and is referred to IR for routine evaluation of abscess tube with possible removal/revision/replacement.        PAST MEDICAL & SURGICAL HISTORY:  Refusal of blood transfusions as patient is Mu-ism  Patient is Mu-ism  Vertigo  Atrial flutter  Diverticulitis  Cardiomyopathy  Kidney stone  Cardiac Pacemaker  HTN - Hypertension  Gout  Diabetes  Congestive Heart Failure  Asthma  Status post left hemicolectomy  S/P cholecystectomy  AICD (Automatic Cardioverter/Defibrillator) Present: inserted in Aug, 2008. Due for battery change in 1 month. ( [employer]) . Inserted by Dr Duffy       Vital Signs Last 24 Hrs:  T(C): 36.8 (16 Dec 2019 09:34), Max: 37.2 (16 Dec 2019 05:13)  T(F): 98.3 (16 Dec 2019 09:34), Max: 98.9 (16 Dec 2019 05:13)  HR: 84 (16 Dec 2019 09:34) (82 - 95)  BP: 90/55 (16 Dec 2019 09:34) (90/55 - 136/74)  RR: 16 (16 Dec 2019 09:34) (16 - 18)  SpO2: 97% (16 Dec 2019 09:34) (97% - 100%)    Allergies:   Coreg (Other)  digoxin (Other; Short breath (Mild to Mod))  penicillins (Hives)    Intolerances:  metoprolol (Other)  Solu-Medrol (Other (Mild to Mod))      Physical Exam: Gen: NAD; A&Ox3    Labs:                         8.1    8.10  )-----------( 236      ( 16 Dec 2019 07:19 )             25.8     12-16    137  |  106  |  4<L>  ----------------------------<  118<H>  4.1   |  23  |  0.66    Ca    9.2      16 Dec 2019 07:19  Phos  3.6     12-16  Mg     1.8     12-16      PT/INR - ( 16 Dec 2019 07:19 )   PT: 15.0 sec;   INR: 1.31 ratio         PTT - ( 16 Dec 2019 07:19 )  PTT:33.0 sec    Informed consent obtained. All questions and concerns have been addressed at this time.     Plan: Abscess tube evaluation with possible removal/revision/replacement.

## 2019-12-16 NOTE — PROGRESS NOTE ADULT - ATTENDING COMMENTS
I saw and examined the pt and discussed the tx plan with the House Staff. I agree with the exam and plan as documented in the surgery resident's note from today.  Will ask Dr ruiz from [facility] to see her regarding possible endoscopic tx of the rectal stump leak.  Tracy Jay MD

## 2019-12-16 NOTE — PROGRESS NOTE ADULT - ASSESSMENT
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 Ongoing sigmoid stump leak on CT with rectal contrast, medial drain removed, left drain (attached to gravity bag) dislodged with minor leakage around site    - consistent carb diet, ISS  - c/w abx  -Continue home meds, will hold eliquis for AF now, holding ASA  - PT c/s  - Appreciate IR recs: 5cc NS forward flush every other day per IR  - scan, possible need to reposition drain    [facility]  [id number]

## 2019-12-16 NOTE — CHART NOTE - NSCHARTNOTEFT_GEN_A_CORE
POST-PROCEDURE NOTE    Subjective:  Patient is s/p drain study and drain replacement into intra-abdominal abscess. Recovering appropriately.     Vital Signs Last 24 Hrs  T(C): 36.7 (16 Dec 2019 13:35), Max: 37.2 (16 Dec 2019 05:13)  T(F): 98.1 (16 Dec 2019 13:35), Max: 98.9 (16 Dec 2019 05:13)  HR: 72 (16 Dec 2019 13:35) (72 - 92)  BP: 106/66 (16 Dec 2019 13:35) (90/55 - 127/80)  BP(mean): --  RR: 16 (16 Dec 2019 13:35) (16 - 18)  SpO2: 94% (16 Dec 2019 13:35) (94% - 99%)  I&O's Detail    15 Dec 2019 07:01  -  16 Dec 2019 07:00  --------------------------------------------------------  IN:    dextrose 5% + sodium chloride 0.45% with potassium chloride 20 mEq/L: 600 mL    IV PiggyBack: 400 mL    Oral Fluid: 1040 mL  Total IN: 2040 mL    OUT:    Colostomy: 500 mL    Voided: 2150 mL  Total OUT: 2650 mL    Total NET: -610 mL      16 Dec 2019 07:01  -  16 Dec 2019 16:14  --------------------------------------------------------  IN:  Total IN: 0 mL    OUT:    Colostomy: 200 mL    Voided: 1000 mL  Total OUT: 1200 mL    Total NET: -1200 mL        ciprofloxacin   IVPB   ciprofloxacin   IVPB 400  metroNIDAZOLE  IVPB   metroNIDAZOLE  IVPB 500  ciprofloxacin   IVPB   ciprofloxacin   IVPB 400  enalapril 10  furosemide   Oral Tab/Cap - Peds 40  metroNIDAZOLE  IVPB   metroNIDAZOLE  IVPB 500  spironolactone 25    PAST MEDICAL & SURGICAL HISTORY:  Refusal of blood transfusions as patient is Quaker  Patient is Quaker  Vertigo  Atrial flutter  Diverticulitis  Cardiomyopathy  Kidney stone  Cardiac Pacemaker  HTN - Hypertension  Gout  Diabetes  Congestive Heart Failure  Asthma  Status post left hemicolectomy  S/P cholecystectomy  AICD (Automatic Cardioverter/Defibrillator) Present: inserted in Aug, 2008. Due for battery change in 1 month. ( [employer]) . Inserted by Dr Duffy        Physical Exam:  General: NAD, resting comfortably in bed  Pulmonary: Nonlabored breathing, no respiratory distress  Cardiovascular: NSR  Abdominal: soft, NT/ND. EC fistula site w/o drainage, dressed with adapt and gauze. Oostomy pink and viable w/ gas and stool in bag. Drain in place in left hemiabdomen with tenderness near entry site.   Extremities: WWP      LABS:                        8.1    8.10  )-----------( 236      ( 16 Dec 2019 07:19 )             25.8     12-16    137  |  106  |  4<L>  ----------------------------<  118<H>  4.1   |  23  |  0.66    Ca    9.2      16 Dec 2019 07:19  Phos  3.6     12-16  Mg     1.8     12-16      PT/INR - ( 16 Dec 2019 07:19 )   PT: 15.0 sec;   INR: 1.31 ratio      PTT - ( 16 Dec 2019 07:19 )  PTT:33.0 sec    CAPILLARY BLOOD GLUCOSE  POCT Blood Glucose.: 107 mg/dL (16 Dec 2019 15:20)  POCT Blood Glucose.: 141 mg/dL (16 Dec 2019 06:22)  POCT Blood Glucose.: 98 mg/dL (15 Dec 2019 21:18)  POCT Blood Glucose.: 145 mg/dL (15 Dec 2019 18:24)      Assessment:  This is a 74y Female 73 yo female with a PMHx of DM2, HTN, COPD, aflutter and perforated sigmoid diverticulitis s/p Delgado’s in July of this year complicated by sigmoid stump leak,  abdominal abscess s/p IR drainage x 2, 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 Ongoing sigmoid stump leak on CT with rectal contrast, drains in place. Now s/p drain revision/replacement by IR 12/16.     Plan:  - Diet: regular (CC)  - Pain control as needed  - c/w abx: cipro, flagyl.   - DVT ppx  - OOB and ambulating as tolerated  - F/u AM labs

## 2019-12-16 NOTE — PROGRESS NOTE ADULT - SUBJECTIVE AND OBJECTIVE BOX
Interventional Radiology Brief-Op Note      Pre-op diagnosis: abdominal abscess s/p drainage    Post op diagnosis: same as above    Indication: minimal output    Procedure: abscess tube evaluation and replacement    Operators: LORY Crawford    Anesthesia (type): Lidocaine 2%    Contrast: 8cc omni    EBL: none    Findings/Follow up Plan of Care: Successful abscess tube evaluation and replacement. Tube check evaluation noted to have collapsed collection with fistula to bowel. Tube to gravity drainage. Flush every other day with 5cc NS.     Specimens Removed: none    Implants: 16F drain    Complications: none    Condition/Disposition: back to floor. GI workup pending surgical intervention.    Please call Interventional Radiology x [phone] with any questions, concerns, or issues.

## 2019-12-16 NOTE — PROGRESS NOTE ADULT - SUBJECTIVE AND OBJECTIVE BOX
GENERAL SURGERY PROGRESS NOTE    SUBJECTIVE  Patient seen and examined. No acute events overnight. Reports tolerating diet without nausea, vomiting, +/+ ostomy, voiding without issues. Denies fever, chills, SOB, chest pain.         OBJECTIVE    PHYSICAL EXAM  General: Appears well, NAD  CHEST: breathing comfortably  CV: appears well perfused  Abdomen: soft, nontender, nondistended, no rebound or guarding, ostomy pink +/+, fistula no output, L drain no output  Extremities: Grossly symmetric    T(C): 37.2 (12-16-19 @ 05:13), Max: 37.2 (12-16-19 @ 05:13)  HR: 82 (12-16-19 @ 05:13) (77 - 97)  BP: 113/67 (12-16-19 @ 05:13) (97/61 - 136/74)  RR: 16 (12-16-19 @ 05:13) (16 - 18)  SpO2: 97% (12-16-19 @ 05:13) (97% - 100%)  Wt(kg): --    LABS:                        8.0    9.18  )-----------( 230      ( 15 Dec 2019 07:24 )             25.4     12-15    136  |  106  |  5<L>  ----------------------------<  104<H>  4.1   |  19<L>  |  0.64    Ca    8.5      15 Dec 2019 07:22  Phos  3.3     12-15  Mg     2.1     12-15      PT/INR - ( 15 Dec 2019 07:24 )   PT: 13.9 sec;   INR: 1.20 ratio         PTT - ( 15 Dec 2019 07:24 )  PTT:33.1 sec        CAPILLARY BLOOD GLUCOSE      POCT Blood Glucose.: 98 mg/dL (15 Dec 2019 21:18)  POCT Blood Glucose.: 145 mg/dL (15 Dec 2019 18:24)  POCT Blood Glucose.: 182 mg/dL (15 Dec 2019 12:56)  POCT Blood Glucose.: 150 mg/dL (15 Dec 2019 10:54)      Is&O's    12-14-19 @ 07:01  -  12-15-19 @ 07:00  --------------------------------------------------------  IN: 1500 mL / OUT: 970 mL / NET: 530 mL    12-15-19 @ 07:01  -  12-16-19 @ 05:33  --------------------------------------------------------  IN: 1965 mL / OUT: 2650 mL / NET: -685 mL                        RADIOLOGY & ADDITIONAL STUDIES GENERAL SURGERY PROGRESS NOTE    SUBJECTIVE  Patient seen and examined. No acute events overnight. Reports tolerating diet without nausea, vomiting, +/+ ostomy, voiding without issues. Denies fever, chills, SOB, chest pain.         OBJECTIVE    PHYSICAL EXAM  General: Appears well, NAD  CHEST: breathing comfortably  CV: appears well perfused  Abdomen: soft, nontender, nondistended, no rebound or guarding, ostomy pink +/+, fistula minimal output, L drain no output  Extremities: Grossly symmetric    T(C): 37.2 (12-16-19 @ 05:13), Max: 37.2 (12-16-19 @ 05:13)  HR: 82 (12-16-19 @ 05:13) (77 - 97)  BP: 113/67 (12-16-19 @ 05:13) (97/61 - 136/74)  RR: 16 (12-16-19 @ 05:13) (16 - 18)  SpO2: 97% (12-16-19 @ 05:13) (97% - 100%)  Wt(kg): --    LABS:                        8.0    9.18  )-----------( 230      ( 15 Dec 2019 07:24 )             25.4     12-15    136  |  106  |  5<L>  ----------------------------<  104<H>  4.1   |  19<L>  |  0.64    Ca    8.5      15 Dec 2019 07:22  Phos  3.3     12-15  Mg     2.1     12-15      PT/INR - ( 15 Dec 2019 07:24 )   PT: 13.9 sec;   INR: 1.20 ratio         PTT - ( 15 Dec 2019 07:24 )  PTT:33.1 sec        CAPILLARY BLOOD GLUCOSE      POCT Blood Glucose.: 98 mg/dL (15 Dec 2019 21:18)  POCT Blood Glucose.: 145 mg/dL (15 Dec 2019 18:24)  POCT Blood Glucose.: 182 mg/dL (15 Dec 2019 12:56)  POCT Blood Glucose.: 150 mg/dL (15 Dec 2019 10:54)      Is&O's    12-14-19 @ 07:01  -  12-15-19 @ 07:00  --------------------------------------------------------  IN: 1500 mL / OUT: 970 mL / NET: 530 mL    12-15-19 @ 07:01  -  12-16-19 @ 05:33  --------------------------------------------------------  IN: 1965 mL / OUT: 2650 mL / NET: -685 mL                        RADIOLOGY & ADDITIONAL STUDIES

## 2019-12-17 ENCOUNTER — APPOINTMENT (OUTPATIENT)
Dept: CT IMAGING | Facility: HOSPITAL | Age: 74
End: 2019-12-17

## 2019-12-17 LAB
ANION GAP SERPL CALC-SCNC: 14 MMOL/L — SIGNIFICANT CHANGE UP (ref 5–17)
BUN SERPL-MCNC: <4 MG/DL — LOW (ref 7–23)
CALCIUM SERPL-MCNC: 9.3 MG/DL — SIGNIFICANT CHANGE UP (ref 8.4–10.5)
CHLORIDE SERPL-SCNC: 100 MMOL/L — SIGNIFICANT CHANGE UP (ref 96–108)
CO2 SERPL-SCNC: 23 MMOL/L — SIGNIFICANT CHANGE UP (ref 22–31)
CREAT SERPL-MCNC: 0.66 MG/DL — SIGNIFICANT CHANGE UP (ref 0.5–1.3)
GLUCOSE BLDC GLUCOMTR-MCNC: 101 MG/DL — HIGH (ref 70–99)
GLUCOSE BLDC GLUCOMTR-MCNC: 123 MG/DL — HIGH (ref 70–99)
GLUCOSE BLDC GLUCOMTR-MCNC: 137 MG/DL — HIGH (ref 70–99)
GLUCOSE SERPL-MCNC: 121 MG/DL — HIGH (ref 70–99)
HCT VFR BLD CALC: 28.4 % — LOW (ref 34.5–45)
HGB BLD-MCNC: 9.1 G/DL — LOW (ref 11.5–15.5)
MAGNESIUM SERPL-MCNC: 1.9 MG/DL — SIGNIFICANT CHANGE UP (ref 1.6–2.6)
MCHC RBC-ENTMCNC: 32 GM/DL — SIGNIFICANT CHANGE UP (ref 32–36)
MCHC RBC-ENTMCNC: 33.7 PG — SIGNIFICANT CHANGE UP (ref 27–34)
MCV RBC AUTO: 105.2 FL — HIGH (ref 80–100)
NRBC # BLD: 0 /100 WBCS — SIGNIFICANT CHANGE UP (ref 0–0)
PHOSPHATE SERPL-MCNC: 4.6 MG/DL — HIGH (ref 2.5–4.5)
PLATELET # BLD AUTO: 252 K/UL — SIGNIFICANT CHANGE UP (ref 150–400)
POTASSIUM SERPL-MCNC: 4.3 MMOL/L — SIGNIFICANT CHANGE UP (ref 3.5–5.3)
POTASSIUM SERPL-SCNC: 4.3 MMOL/L — SIGNIFICANT CHANGE UP (ref 3.5–5.3)
RBC # BLD: 2.7 M/UL — LOW (ref 3.8–5.2)
RBC # FLD: 13.8 % — SIGNIFICANT CHANGE UP (ref 10.3–14.5)
SODIUM SERPL-SCNC: 137 MMOL/L — SIGNIFICANT CHANGE UP (ref 135–145)
WBC # BLD: 8.77 K/UL — SIGNIFICANT CHANGE UP (ref 3.8–10.5)
WBC # FLD AUTO: 8.77 K/UL — SIGNIFICANT CHANGE UP (ref 3.8–10.5)

## 2019-12-17 PROCEDURE — 99222 1ST HOSP IP/OBS MODERATE 55: CPT | Mod: GC,25

## 2019-12-17 PROCEDURE — 44640 REPAIR BOWEL-SKIN FISTULA: CPT | Mod: GC

## 2019-12-17 RX ADMIN — ATORVASTATIN CALCIUM 20 MILLIGRAM(S): 80 TABLET, FILM COATED ORAL at 21:12

## 2019-12-17 RX ADMIN — ENOXAPARIN SODIUM 40 MILLIGRAM(S): 100 INJECTION SUBCUTANEOUS at 12:02

## 2019-12-17 RX ADMIN — Medication 650 MILLIGRAM(S): at 06:12

## 2019-12-17 RX ADMIN — Medication 650 MILLIGRAM(S): at 06:45

## 2019-12-17 RX ADMIN — BUDESONIDE AND FORMOTEROL FUMARATE DIHYDRATE 2 PUFF(S): 160; 4.5 AEROSOL RESPIRATORY (INHALATION) at 06:14

## 2019-12-17 RX ADMIN — BUDESONIDE AND FORMOTEROL FUMARATE DIHYDRATE 2 PUFF(S): 160; 4.5 AEROSOL RESPIRATORY (INHALATION) at 21:13

## 2019-12-17 RX ADMIN — CHLORHEXIDINE GLUCONATE 1 APPLICATION(S): 213 SOLUTION TOPICAL at 12:01

## 2019-12-17 RX ADMIN — Medication 500 MILLIGRAM(S): at 21:12

## 2019-12-17 RX ADMIN — Medication 1 MILLIGRAM(S): at 21:12

## 2019-12-17 RX ADMIN — SPIRONOLACTONE 25 MILLIGRAM(S): 25 TABLET, FILM COATED ORAL at 06:12

## 2019-12-17 RX ADMIN — Medication 50 GRAM(S): at 12:07

## 2019-12-17 RX ADMIN — ALBUTEROL 1 PUFF(S): 90 AEROSOL, METERED ORAL at 06:15

## 2019-12-17 RX ADMIN — ALBUTEROL 1 PUFF(S): 90 AEROSOL, METERED ORAL at 12:01

## 2019-12-17 RX ADMIN — ALBUTEROL 1 PUFF(S): 90 AEROSOL, METERED ORAL at 21:14

## 2019-12-17 RX ADMIN — MONTELUKAST 10 MILLIGRAM(S): 4 TABLET, CHEWABLE ORAL at 12:02

## 2019-12-17 RX ADMIN — PANTOPRAZOLE SODIUM 40 MILLIGRAM(S): 20 TABLET, DELAYED RELEASE ORAL at 06:12

## 2019-12-17 NOTE — CONSULT NOTE ADULT - SUBJECTIVE AND OBJECTIVE BOX
Chief Complaint:  Patient is a 74y old  Female who presents with a chief complaint of displaced IR drain (17 Dec 2019 04:42)      HPI:  75 yo female with a PMHx of DM2, HTN, COPD, aflutter on eliquis and perforated sigmoid diverticulitis s/p ex-lap with creation of end transverse colostomy in July of this year complicated by sigmoid stump leak, abdominal abscess s/p IR drainage x 2 presents with LLQ abdominal pain and ongoing sigmoid stump leak see on CT with rectal contrast.  [facility] consulted for endoscopic intervention.     CT with rectal contrast- Contrast administered via rectal tube is noted to leak from the Delgado stump with extraluminal contrast tracking toward the left hemiabdomen at the site of a percutaneous drainage catheter as well as toward the anterior abdominal wall at the site of a percutaneous catheter.     Allergies:  Coreg (Other)  digoxin (Other; Short breath (Mild to Mod))  metoprolol (Other)  penicillins (Hives)  Solu-Medrol (Other (Mild to Mod))      Home Medications:    Hospital Medications:  acetaminophen   Tablet .. 650 milliGRAM(s) Oral every 6 hours PRN  ALBUTerol    90 MICROgram(s) HFA Inhaler 1 Puff(s) Inhalation every 4 hours  ascorbic acid 500 milliGRAM(s) Oral daily  atorvastatin 20 milliGRAM(s) Oral at bedtime  budesonide 160 MICROgram(s)/formoterol 4.5 MICROgram(s) Inhaler 2 Puff(s) Inhalation two times a day  chlorhexidine 4% Liquid 1 Application(s) Topical <User Schedule>  dextrose 40% Gel 15 Gram(s) Oral once PRN  dextrose 5% + sodium chloride 0.45% with potassium chloride 20 mEq/L 1000 milliLiter(s) IV Continuous <Continuous>  dextrose 5%. 1000 milliLiter(s) IV Continuous <Continuous>  dextrose 50% Injectable 12.5 Gram(s) IV Push once  dextrose 50% Injectable 25 Gram(s) IV Push once  dextrose 50% Injectable 25 Gram(s) IV Push once  enalapril 10 milliGRAM(s) Oral two times a day  enoxaparin Injectable 40 milliGRAM(s) SubCutaneous daily  folic acid 1 milliGRAM(s) Oral daily  furosemide   Oral Tab/Cap - Peds 40 milliGRAM(s) Oral two times a day  glucagon  Injectable 1 milliGRAM(s) IntraMuscular once PRN  guaifenesin/dextromethorphan  Syrup 10 milliLiter(s) Oral every 6 hours PRN  insulin lispro (HumaLOG) corrective regimen sliding scale   SubCutaneous Before meals and at bedtime  magnesium oxide 400 milliGRAM(s) Oral three times a day with meals  magnesium sulfate  IVPB 2 Gram(s) IV Intermittent daily  montelukast 10 milliGRAM(s) Oral daily  pantoprazole    Tablet 40 milliGRAM(s) Oral before breakfast  sodium chloride 0.9% lock flush 10 milliLiter(s) IV Push every 1 hour PRN  spironolactone 25 milliGRAM(s) Oral daily      PMHX/PSHX:  Refusal of blood transfusions as patient is Episcopal  Patient is Episcopal  Vertigo  Atrial flutter  Diverticulitis  Cardiomyopathy  Kidney stone  COPD (chronic obstructive pulmonary disease)  Cardiac Pacemaker  HTN - Hypertension  Gout  Diabetes  Congestive Heart Failure  Asthma  Status post left hemicolectomy  S/P cholecystectomy  AICD (Automatic Cardioverter/Defibrillator) Present  S/P Cholecystectomy      Family history:  Family history of brain tumor  No pertinent family history in first degree relatives      There is no family history of peptic ulcer disease, gastric cancer, colon polyps, colon cancer, celiac disease, biliary, hepatic, or pancreatic disease.  None of the female relatives have breast, uterine, or ovarian cancer.     Social History:     ROS:     General:  No wt loss, fevers, chills, night sweats, fatigue,   Eyes:  Good vision, no reported pain  ENT:  No sore throat, pain, runny nose, dysphagia  CV:  No pain, palpitations, hypo/hypertension  Resp:  No dyspnea, cough, tachypnea, wheezing  GI:  See HPI  :  No pain, bleeding, incontinence, nocturia  Muscle:  No pain, weakness  Neuro:  No weakness, tingling, memory problems  Psych:  No fatigue, insomnia, mood problems, depression  Endocrine:  No polyuria, polydipsia, cold/heat intolerance  Heme:  No petechiae, ecchymosis, easy bruisability  Skin:  No rash, tattoos, scars, edema      PHYSICAL EXAM:     GENERAL:  Appears stated age, well-groomed  HEENT:  NC/AT,  conjunctivae clear and pink  CHEST:  Full & symmetric excursion, no increased effort, breath sounds clear  HEART:  Regular rhythm, S1, S2, no murmur/rub/S3/S4  ABDOMEN:  Soft, non-tender, non-distended, normoactive bowel sounds  EXTEREMITIES:  no cyanosis,clubbing or edema  SKIN:  No rash/erythema/ecchymoses  NEURO:  Alert, oriented, no asterixis    Vital Signs:  Vital Signs Last 24 Hrs  T(C): 36.6 (17 Dec 2019 09:15), Max: 36.8 (17 Dec 2019 02:37)  T(F): 97.8 (17 Dec 2019 09:15), Max: 98.3 (17 Dec 2019 02:37)  HR: 93 (17 Dec 2019 09:15) (72 - 93)  BP: 102/57 (17 Dec 2019 09:15) (101/64 - 114/74)  BP(mean): --  RR: 18 (17 Dec 2019 09:15) (16 - 18)  SpO2: 97% (17 Dec 2019 09:15) (94% - 97%)  Daily     Daily     LABS:                        9.1    8.77  )-----------( 252      ( 17 Dec 2019 08:56 )             28.4     Mean Cell Volume: 105.2 fl (12-17-19 @ 08:56)    12-17    137  |  100  |  <4<L>  ----------------------------<  121<H>  4.3   |  23  |  0.66    Ca    9.3      17 Dec 2019 07:40  Phos  4.6     12-17  Mg     1.9     12-17        PT/INR - ( 16 Dec 2019 07:19 )   PT: 15.0 sec;   INR: 1.31 ratio         PTT - ( 16 Dec 2019 07:19 )  PTT:33.0 sec                            9.1    8.77  )-----------( 252      ( 17 Dec 2019 08:56 )             28.4                         8.1    8.10  )-----------( 236      ( 16 Dec 2019 07:19 )             25.8                         8.0    9.18  )-----------( 230      ( 15 Dec 2019 07:24 )             25.4     Imaging:  < from: CT Abdomen and Pelvis w/ IV Cont (12.12.19 @ 19:20) >  FINDINGS:    LOWER CHEST: Linear atelectasis in the right middle and lower lobes.   Partially visualized AICD leads.    LIVER: Within normal limits.  BILE DUCTS: Normal caliber.  GALLBLADDER: Cholecystectomy.  SPLEEN: Within normal limits.  PANCREAS: Within normal limits.  ADRENALS: Within normal limits.  KIDNEYS/URETERS: A few nonobstructing left renal stones, measuring up to   6 mm.    BLADDER: Within normal limits.  REPRODUCTIVE ORGANS: Uterine fibroids.    BOWEL/PERITONEUM: Status post Delgado's procedure with left hemicolectomy   and right-sided ostomy. Colonic diverticulosis. No bowel obstruction.   Contrast administered via rectal tube is noted to leak from the Delgado   stump with extraluminal contrast tracking toward the left hemiabdomen at   the site of a percutaneous drainage catheter as well as toward the   anterior abdominal wall at the site of a percutaneous catheter. No   ascites or discrete intra-abdominal collection.   VESSELS: Atherosclerotic changes.  RETROPERITONEUM/LYMPH NODES: No lymphadenopathy.    ABDOMINAL WALL: Large anterior abdominal wall hernia. Right lower   quadrant ostomy.: Collapsed midline abdominal wall collection containing   extraluminal contrast without change in size from prior CT 11/13/2019.   Percutaneous drainage catheter in place.  BONES: Degenerative changes.    IMPRESSION:     Active leak of contrast from a Delgado stump with a collapsed midline   anterior abdominal wall collection which is not significantly changed   from prior imaging 11/13/2019.            < end of copied text > Chief Complaint:  Patient is a 74y old  Female who presents with a chief complaint of displaced IR drain (17 Dec 2019 04:42)      HPI:  73 yo female with a PMHx of DM2, HTN, COPD, aflutter on eliquis and perforated sigmoid diverticulitis s/p ex-lap with creation of end transverse colostomy in July of this year complicated by sigmoid stump leak, abdominal abscess s/p IR drainage x 2 presents with LLQ abdominal pain and ongoing sigmoid stump leak see on CT with rectal contrast.  [facility] consulted for endoscopic intervention.     CT with rectal contrast- Contrast administered via rectal tube is noted to leak from the Delgado stump with extraluminal contrast tracking toward the left hemiabdomen at the site of a percutaneous drainage catheter as well as toward the anterior abdominal wall at the site of a percutaneous catheter.     Allergies:  Coreg (Other)  digoxin (Other; Short breath (Mild to Mod))  metoprolol (Other)  penicillins (Hives)  Solu-Medrol (Other (Mild to Mod))      Home Medications:    Hospital Medications:  acetaminophen   Tablet .. 650 milliGRAM(s) Oral every 6 hours PRN  ALBUTerol    90 MICROgram(s) HFA Inhaler 1 Puff(s) Inhalation every 4 hours  ascorbic acid 500 milliGRAM(s) Oral daily  atorvastatin 20 milliGRAM(s) Oral at bedtime  budesonide 160 MICROgram(s)/formoterol 4.5 MICROgram(s) Inhaler 2 Puff(s) Inhalation two times a day  chlorhexidine 4% Liquid 1 Application(s) Topical <User Schedule>  dextrose 40% Gel 15 Gram(s) Oral once PRN  dextrose 5% + sodium chloride 0.45% with potassium chloride 20 mEq/L 1000 milliLiter(s) IV Continuous <Continuous>  dextrose 5%. 1000 milliLiter(s) IV Continuous <Continuous>  dextrose 50% Injectable 12.5 Gram(s) IV Push once  dextrose 50% Injectable 25 Gram(s) IV Push once  dextrose 50% Injectable 25 Gram(s) IV Push once  enalapril 10 milliGRAM(s) Oral two times a day  enoxaparin Injectable 40 milliGRAM(s) SubCutaneous daily  folic acid 1 milliGRAM(s) Oral daily  furosemide   Oral Tab/Cap - Peds 40 milliGRAM(s) Oral two times a day  glucagon  Injectable 1 milliGRAM(s) IntraMuscular once PRN  guaifenesin/dextromethorphan  Syrup 10 milliLiter(s) Oral every 6 hours PRN  insulin lispro (HumaLOG) corrective regimen sliding scale   SubCutaneous Before meals and at bedtime  magnesium oxide 400 milliGRAM(s) Oral three times a day with meals  magnesium sulfate  IVPB 2 Gram(s) IV Intermittent daily  montelukast 10 milliGRAM(s) Oral daily  pantoprazole    Tablet 40 milliGRAM(s) Oral before breakfast  sodium chloride 0.9% lock flush 10 milliLiter(s) IV Push every 1 hour PRN  spironolactone 25 milliGRAM(s) Oral daily      PMHX/PSHX:  Refusal of blood transfusions as patient is Yazdanism  Patient is Yazdanism  Vertigo  Atrial flutter  Diverticulitis  Cardiomyopathy  Kidney stone  COPD (chronic obstructive pulmonary disease)  Cardiac Pacemaker  HTN - Hypertension  Gout  Diabetes  Congestive Heart Failure  Asthma  Status post left hemicolectomy  S/P cholecystectomy  AICD (Automatic Cardioverter/Defibrillator) Present  S/P Cholecystectomy      Family history:  Family history of brain tumor  No pertinent family history in first degree relatives      There is no family history of peptic ulcer disease, gastric cancer, colon polyps, colon cancer, celiac disease, biliary, hepatic, or pancreatic disease.  None of the female relatives have breast, uterine, or ovarian cancer.     Social History: No smoking or EtOH    ROS:     General:  No wt loss, fevers, chills, night sweats, fatigue,   Eyes:  Good vision, no reported pain  ENT:  No sore throat, pain, runny nose, dysphagia  CV:  No pain, palpitations, hypo/hypertension  Resp:  No dyspnea, cough, tachypnea, wheezing  GI:  See HPI  :  No pain, bleeding, incontinence, nocturia  Muscle:  No pain, weakness  Neuro:  No weakness, tingling, memory problems  Psych:  No fatigue, insomnia, mood problems, depression  Endocrine:  No polyuria, polydipsia, cold/heat intolerance  Heme:  No petechiae, ecchymosis, easy bruisability  Skin:  No rash, tattoos, scars, edema      PHYSICAL EXAM:     GENERAL:  Appears stated age, well-groomed  HEENT:  NC/AT,  conjunctivae clear and pink  CHEST:  Full & symmetric excursion, no increased effort, breath sounds clear  HEART:  Regular rhythm, S1, S2, no murmur/rub/S3/S4  ABDOMEN:  Soft, non-tender, non-distended, normoactive bowel sounds  EXTEREMITIES:  no cyanosis,clubbing or edema  SKIN:  No rash/erythema/ecchymoses  NEURO:  Alert, oriented, no asterixis    Vital Signs:  Vital Signs Last 24 Hrs  T(C): 36.6 (17 Dec 2019 09:15), Max: 36.8 (17 Dec 2019 02:37)  T(F): 97.8 (17 Dec 2019 09:15), Max: 98.3 (17 Dec 2019 02:37)  HR: 93 (17 Dec 2019 09:15) (72 - 93)  BP: 102/57 (17 Dec 2019 09:15) (101/64 - 114/74)  BP(mean): --  RR: 18 (17 Dec 2019 09:15) (16 - 18)  SpO2: 97% (17 Dec 2019 09:15) (94% - 97%)  Daily     Daily     LABS:                        9.1    8.77  )-----------( 252      ( 17 Dec 2019 08:56 )             28.4     Mean Cell Volume: 105.2 fl (12-17-19 @ 08:56)    12-17    137  |  100  |  <4<L>  ----------------------------<  121<H>  4.3   |  23  |  0.66    Ca    9.3      17 Dec 2019 07:40  Phos  4.6     12-17  Mg     1.9     12-17        PT/INR - ( 16 Dec 2019 07:19 )   PT: 15.0 sec;   INR: 1.31 ratio         PTT - ( 16 Dec 2019 07:19 )  PTT:33.0 sec                            9.1    8.77  )-----------( 252      ( 17 Dec 2019 08:56 )             28.4                         8.1    8.10  )-----------( 236      ( 16 Dec 2019 07:19 )             25.8                         8.0    9.18  )-----------( 230      ( 15 Dec 2019 07:24 )             25.4     Imaging:  < from: CT Abdomen and Pelvis w/ IV Cont (12.12.19 @ 19:20) >  FINDINGS:    LOWER CHEST: Linear atelectasis in the right middle and lower lobes.   Partially visualized AICD leads.    LIVER: Within normal limits.  BILE DUCTS: Normal caliber.  GALLBLADDER: Cholecystectomy.  SPLEEN: Within normal limits.  PANCREAS: Within normal limits.  ADRENALS: Within normal limits.  KIDNEYS/URETERS: A few nonobstructing left renal stones, measuring up to   6 mm.    BLADDER: Within normal limits.  REPRODUCTIVE ORGANS: Uterine fibroids.    BOWEL/PERITONEUM: Status post Delgado's procedure with left hemicolectomy   and right-sided ostomy. Colonic diverticulosis. No bowel obstruction.   Contrast administered via rectal tube is noted to leak from the Delgado   stump with extraluminal contrast tracking toward the left hemiabdomen at   the site of a percutaneous drainage catheter as well as toward the   anterior abdominal wall at the site of a percutaneous catheter. No   ascites or discrete intra-abdominal collection.   VESSELS: Atherosclerotic changes.  RETROPERITONEUM/LYMPH NODES: No lymphadenopathy.    ABDOMINAL WALL: Large anterior abdominal wall hernia. Right lower   quadrant ostomy.: Collapsed midline abdominal wall collection containing   extraluminal contrast without change in size from prior CT 11/13/2019.   Percutaneous drainage catheter in place.  BONES: Degenerative changes.    IMPRESSION:     Active leak of contrast from a Delgado stump with a collapsed midline   anterior abdominal wall collection which is not significantly changed   from prior imaging 11/13/2019.            < end of copied text >

## 2019-12-17 NOTE — PROGRESS NOTE ADULT - ASSESSMENT
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 Ongoing sigmoid stump leak on CT with rectal contrast, medial drain removed, left drain (attached to gravity bag) dislodged with minor leakage around site. Now s/p drain revision/replacement by IR 12/16.     Plan:  - Diet: regular (CC)  - Pain control as needed  - c/w abx: cipro, flagyl.   - DVT ppx  - OOB and ambulating as tolerated  - F/u AM labs.    [facility]  [id number] 75 yo female with a PMHx of DM2, HTN, COPD, aflutter on eliquis and perforated sigmoid diverticulitis s/p ex-lap with creation of end transverse colostomy in July of this year complicated by sigmoid stump leak,  abdominal abscess s/p IR drainage x 2, EC fistula that is now pouched,   presents from rehab with left lower abdominal pain around the site of the drains. Ongoing sigmoid stump leak on CT with rectal contrast, medial drain removed, left drain (attached to gravity bag) dislodged with minor leakage around site. Now s/p drain revision/replacement by IR 12/16.     Plan:  - Diet: regular (CC)  - Pain control as needed  - c/w abx: cipro, flagyl.   - DVT ppx  - OOB and ambulating as tolerated  - F/u AM labs.    [facility]  [id number] 73 yo female with a PMHx of DM2, HTN, COPD, aflutter on eliquis and perforated sigmoid diverticulitis s/p ex-lap with creation of end transverse colostomy in July of this year complicated by sigmoid stump leak,  abdominal abscess s/p IR drainage x 2, EC fistula that is now pouched,   presents from rehab with left lower abdominal pain around the site of the drains. Ongoing sigmoid stump leak on CT with rectal contrast, medial drain removed, left drain (attached to gravity bag) dislodged with minor leakage around site. Now s/p drain revision/replacement by IR 12/16.     Plan:  - NPO  - Re-apply fistula pouch to accurately monitor fistula output  - Pain control as needed  - c/w abx: cipro, flagyl.   - DVT ppx  - OOB and ambulating as tolerated  - F/u AM labs.    [facility]  [id number]

## 2019-12-17 NOTE — PROGRESS NOTE ADULT - SUBJECTIVE AND OBJECTIVE BOX
Pre-Endoscopy Evaluation      Referring Physician: dr. jack cano                                   Procedure: flexible sigmoidoscopy/colonoscopy    Indication for Procedure: leakage from Delgado stump    Pertinent History: 74y old female with a PMH of DM2, HTN, COPD, aflutter on eliquis and perforated sigmoid diverticulitis s/p ex-lap with creation of end transverse colostomy in July of this year complicated by sigmoid stump leak, abdominal abscess s/p IR drainage x 2 presents with LLQ abdominal pain and ongoing sigmoid stump leak see on CT with rectal contrast    Sedation by Anesthesia [x]    PAST MEDICAL & SURGICAL HISTORY:  Refusal of blood transfusions as patient is Yarsani  Patient is Yarsani  Vertigo  Atrial flutter  Diverticulitis  Cardiomyopathy  Kidney stone  Cardiac Pacemaker  HTN - Hypertension  Gout  Diabetes  Congestive Heart Failure  Asthma  Status post left hemicolectomy  S/P cholecystectomy  AICD (Automatic Cardioverter/Defibrillator) Present: inserted in Aug, 2008. Due for battery change in 1 month. ( [employer]) . Inserted by Dr Duffy      PMH of Gastroparesis [ ]  Gastric Surgery [ ]  Gastric Outlet Obstruction [ ]    Allergies    Coreg (Other)  digoxin (Other; Short breath (Mild to Mod))  penicillins (Hives)    Intolerances:    metoprolol (Other)  Solu-Medrol (Other (Mild to Mod))    Latex allergy: [ ] yes [x] no    Medications:MEDICATIONS  (STANDING):  ALBUTerol    90 MICROgram(s) HFA Inhaler 1 Puff(s) Inhalation every 4 hours  ascorbic acid 500 milliGRAM(s) Oral daily  atorvastatin 20 milliGRAM(s) Oral at bedtime  budesonide 160 MICROgram(s)/formoterol 4.5 MICROgram(s) Inhaler 2 Puff(s) Inhalation two times a day  chlorhexidine 4% Liquid 1 Application(s) Topical <User Schedule>  dextrose 5% + sodium chloride 0.45% with potassium chloride 20 mEq/L 1000 milliLiter(s) (75 mL/Hr) IV Continuous <Continuous>  dextrose 5%. 1000 milliLiter(s) (50 mL/Hr) IV Continuous <Continuous>  dextrose 50% Injectable 12.5 Gram(s) IV Push once  dextrose 50% Injectable 25 Gram(s) IV Push once  dextrose 50% Injectable 25 Gram(s) IV Push once  enalapril 10 milliGRAM(s) Oral two times a day  enoxaparin Injectable 40 milliGRAM(s) SubCutaneous daily  folic acid 1 milliGRAM(s) Oral daily  furosemide   Oral Tab/Cap - Peds 40 milliGRAM(s) Oral two times a day  insulin lispro (HumaLOG) corrective regimen sliding scale   SubCutaneous Before meals and at bedtime  magnesium oxide 400 milliGRAM(s) Oral three times a day with meals  magnesium sulfate  IVPB 2 Gram(s) IV Intermittent daily  montelukast 10 milliGRAM(s) Oral daily  pantoprazole    Tablet 40 milliGRAM(s) Oral before breakfast  spironolactone 25 milliGRAM(s) Oral daily    MEDICATIONS  (PRN):  acetaminophen   Tablet .. 650 milliGRAM(s) Oral every 6 hours PRN Mild Pain (1 - 3), Moderate Pain (4 - 6)  dextrose 40% Gel 15 Gram(s) Oral once PRN Blood Glucose LESS THAN 70 milliGRAM(s)/deciliter  glucagon  Injectable 1 milliGRAM(s) IntraMuscular once PRN Glucose LESS THAN 70 milligrams/deciliter  guaifenesin/dextromethorphan  Syrup 10 milliLiter(s) Oral every 6 hours PRN Cough  sodium chloride 0.9% lock flush 10 milliLiter(s) IV Push every 1 hour PRN Pre/post blood products, medications, blood draw, and to maintain line patency      Smoking: [ ] yes  [X] no    AICD/PPM: [X] yes   [] no    Pertinent lab data:                        9.1    8.77  )-----------( 252      ( 17 Dec 2019 08:56 )             28.4     12-17    137  |  100  |  <4<L>  ----------------------------<  121<H>  4.3   |  23  |  0.66    Ca    9.3      17 Dec 2019 07:40  Phos  4.6     12-17  Mg     1.9     12-17      CAPILLARY BLOOD GLUCOSE  POCT Blood Glucose.: 137 mg/dL (17 Dec 2019 12:11)    PT/INR - ( 16 Dec 2019 07:19 )   PT: 15.0 sec;   INR: 1.31 ratio         PTT - ( 16 Dec 2019 07:19 )  PTT:33.0 sec          Physical Examination:  Daily     Daily   Vital Signs Last 24 Hrs  T(C): 36.6 (17 Dec 2019 14:11), Max: 36.8 (17 Dec 2019 02:37)  T(F): 97.8 (17 Dec 2019 14:11), Max: 98.3 (17 Dec 2019 02:37)  HR: 83 (17 Dec 2019 14:11) (83 - 93)  BP: 104/65 (17 Dec 2019 14:11) (101/64 - 114/74)  BP(mean): --  RR: 18 (17 Dec 2019 14:11) (17 - 18)  SpO2: 98% (17 Dec 2019 14:11) (95% - 98%)    Drug Dosing Weight  Height (cm): 157.48 (12 Dec 2019 12:08)  Weight (kg): 65.3 (12 Dec 2019 12:08)  BMI (kg/m2): 26.3 (12 Dec 2019 12:08)  BSA (m2): 1.66 (12 Dec 2019 12:08)    Constitutional: NAD     Neck:  No JVD    Respiratory: CTAB/L    Cardiovascular: S1 and S2    Gastrointestinal: BS+, soft, NT/ND    Extremities: No peripheral edema    Neurological: A/O x 3    : No Ching    Skin: No rashes    Comments:      The patient is a suitable candidate for the planned procedure unless box checked [ ]  No, explain:

## 2019-12-17 NOTE — PROGRESS NOTE ADULT - SUBJECTIVE AND OBJECTIVE BOX
GENERAL SURGERY PROGRESS NOTE    SUBJECTIVE  Patient seen and examined. No acute events overnight. Reports tolerating diet without nausea, vomiting, +/+ ostomy, voiding without issues. Denies fever, chills, SOB, chest pain.     OBJECTIVE  PHYSICAL EXAM  General: Appears well, NAD  CHEST: breathing comfortably  CV: appears well perfused  Abdomen: soft, nontender, nondistended, no rebound or guarding, ostomy pink +/+, fistula minimal output, L drain no output  Extremities: Grossly symmetric  Ostomy: pink and viable, gas and stool in bag    V/S:  Vital Signs Last 24 Hrs  T(C): 36.8 (17 Dec 2019 02:37), Max: 37.2 (16 Dec 2019 05:13)  T(F): 98.3 (17 Dec 2019 02:37), Max: 98.9 (16 Dec 2019 05:13)  HR: 89 (17 Dec 2019 02:37) (72 - 91)  BP: 114/74 (17 Dec 2019 02:37) (90/55 - 114/74)  BP(mean): --  RR: 17 (17 Dec 2019 02:37) (16 - 18)  SpO2: 95% (17 Dec 2019 02:37) (94% - 97%)    --------------------------------------------------------------------------------------------------  I/Os:    15 Dec 2019 07:01  -  16 Dec 2019 07:00  --------------------------------------------------------  IN:    dextrose 5% + sodium chloride 0.45% with potassium chloride 20 mEq/L: 600 mL    IV PiggyBack: 400 mL    Oral Fluid: 1040 mL  Total IN: 2040 mL    OUT:    Colostomy: 500 mL    Voided: 2150 mL  Total OUT: 2650 mL    Total NET: -610 mL      16 Dec 2019 07:01  -  17 Dec 2019 04:44  --------------------------------------------------------  IN:    dextrose 5% + sodium chloride 0.45% with potassium chloride 20 mEq/L: 1350 mL    Oral Fluid: 260 mL  Total IN: 1610 mL    OUT:    Colostomy: 200 mL    Voided: 2050 mL  Total OUT: 2250 mL    Total NET: -640 mL        --------------------------------------------------------------------------------------------------  LABS:                        8.1    8.10  )-----------( 236      ( 16 Dec 2019 07:19 )             25.8     16 Dec 2019 07:19    137    |  106    |  4      ----------------------------<  118    4.1     |  23     |  0.66     Ca    9.2        16 Dec 2019 07:19  Phos  3.6       16 Dec 2019 07:19  Mg     1.8       16 Dec 2019 07:19      PT/INR - ( 16 Dec 2019 07:19 )   PT: 15.0 sec;   INR: 1.31 ratio         PTT - ( 16 Dec 2019 07:19 )  PTT:33.0 sec  CAPILLARY BLOOD GLUCOSE      POCT Blood Glucose.: 125 mg/dL (16 Dec 2019 21:29)  POCT Blood Glucose.: 107 mg/dL (16 Dec 2019 15:20)  POCT Blood Glucose.: 141 mg/dL (16 Dec 2019 06:22)              --------------------------------------------------------------------------------------------------  MEDICATIONS  (STANDING):  ALBUTerol    90 MICROgram(s) HFA Inhaler 1 Puff(s) Inhalation every 4 hours  ascorbic acid 500 milliGRAM(s) Oral daily  atorvastatin 20 milliGRAM(s) Oral at bedtime  budesonide 160 MICROgram(s)/formoterol 4.5 MICROgram(s) Inhaler 2 Puff(s) Inhalation two times a day  chlorhexidine 4% Liquid 1 Application(s) Topical <User Schedule>  dextrose 5% + sodium chloride 0.45% with potassium chloride 20 mEq/L 1000 milliLiter(s) (75 mL/Hr) IV Continuous <Continuous>  dextrose 5%. 1000 milliLiter(s) (50 mL/Hr) IV Continuous <Continuous>  dextrose 50% Injectable 12.5 Gram(s) IV Push once  dextrose 50% Injectable 25 Gram(s) IV Push once  dextrose 50% Injectable 25 Gram(s) IV Push once  enalapril 10 milliGRAM(s) Oral two times a day  enoxaparin Injectable 40 milliGRAM(s) SubCutaneous daily  folic acid 1 milliGRAM(s) Oral daily  furosemide   Oral Tab/Cap - Peds 40 milliGRAM(s) Oral two times a day  insulin lispro (HumaLOG) corrective regimen sliding scale   SubCutaneous Before meals and at bedtime  magnesium oxide 400 milliGRAM(s) Oral three times a day with meals  magnesium sulfate  IVPB 2 Gram(s) IV Intermittent daily  montelukast 10 milliGRAM(s) Oral daily  pantoprazole    Tablet 40 milliGRAM(s) Oral before breakfast  spironolactone 25 milliGRAM(s) Oral daily    MEDICATIONS  (PRN):  acetaminophen   Tablet .. 650 milliGRAM(s) Oral every 6 hours PRN Mild Pain (1 - 3), Moderate Pain (4 - 6)  dextrose 40% Gel 15 Gram(s) Oral once PRN Blood Glucose LESS THAN 70 milliGRAM(s)/deciliter  glucagon  Injectable 1 milliGRAM(s) IntraMuscular once PRN Glucose LESS THAN 70 milligrams/deciliter  guaifenesin/dextromethorphan  Syrup 10 milliLiter(s) Oral every 6 hours PRN Cough  sodium chloride 0.9% lock flush 10 milliLiter(s) IV Push every 1 hour PRN Pre/post blood products, medications, blood draw, and to maintain line patency

## 2019-12-17 NOTE — PROGRESS NOTE ADULT - ATTENDING COMMENTS
I saw and examined the pt and discussed the tx plan with the House Staff. I agree with the exam and plan as documented in the surgery resident's note from today.  F/u on advanced GI input on possible endoscopic tx of rectal stump leak.   Tracy Jay MD

## 2019-12-18 LAB
ANION GAP SERPL CALC-SCNC: 12 MMOL/L — SIGNIFICANT CHANGE UP (ref 5–17)
ANION GAP SERPL CALC-SCNC: 12 MMOL/L — SIGNIFICANT CHANGE UP (ref 5–17)
BUN SERPL-MCNC: 5 MG/DL — LOW (ref 7–23)
BUN SERPL-MCNC: 5 MG/DL — LOW (ref 7–23)
CALCIUM SERPL-MCNC: 9 MG/DL — SIGNIFICANT CHANGE UP (ref 8.4–10.5)
CALCIUM SERPL-MCNC: 9.1 MG/DL — SIGNIFICANT CHANGE UP (ref 8.4–10.5)
CHLORIDE SERPL-SCNC: 100 MMOL/L — SIGNIFICANT CHANGE UP (ref 96–108)
CHLORIDE SERPL-SCNC: 99 MMOL/L — SIGNIFICANT CHANGE UP (ref 96–108)
CO2 SERPL-SCNC: 21 MMOL/L — LOW (ref 22–31)
CO2 SERPL-SCNC: 22 MMOL/L — SIGNIFICANT CHANGE UP (ref 22–31)
CREAT SERPL-MCNC: 0.71 MG/DL — SIGNIFICANT CHANGE UP (ref 0.5–1.3)
CREAT SERPL-MCNC: 0.75 MG/DL — SIGNIFICANT CHANGE UP (ref 0.5–1.3)
GLUCOSE BLDC GLUCOMTR-MCNC: 112 MG/DL — HIGH (ref 70–99)
GLUCOSE BLDC GLUCOMTR-MCNC: 138 MG/DL — HIGH (ref 70–99)
GLUCOSE BLDC GLUCOMTR-MCNC: 146 MG/DL — HIGH (ref 70–99)
GLUCOSE SERPL-MCNC: 130 MG/DL — HIGH (ref 70–99)
GLUCOSE SERPL-MCNC: 160 MG/DL — HIGH (ref 70–99)
HCT VFR BLD CALC: 28.1 % — LOW (ref 34.5–45)
HCT VFR BLD CALC: 28.8 % — LOW (ref 34.5–45)
HGB BLD-MCNC: 9 G/DL — LOW (ref 11.5–15.5)
HGB BLD-MCNC: 9.2 G/DL — LOW (ref 11.5–15.5)
MAGNESIUM SERPL-MCNC: 1.8 MG/DL — SIGNIFICANT CHANGE UP (ref 1.6–2.6)
MAGNESIUM SERPL-MCNC: 2.3 MG/DL — SIGNIFICANT CHANGE UP (ref 1.6–2.6)
MCHC RBC-ENTMCNC: 31.9 GM/DL — LOW (ref 32–36)
MCHC RBC-ENTMCNC: 32 GM/DL — SIGNIFICANT CHANGE UP (ref 32–36)
MCHC RBC-ENTMCNC: 33.5 PG — SIGNIFICANT CHANGE UP (ref 27–34)
MCHC RBC-ENTMCNC: 33.7 PG — SIGNIFICANT CHANGE UP (ref 27–34)
MCV RBC AUTO: 104.7 FL — HIGH (ref 80–100)
MCV RBC AUTO: 105.2 FL — HIGH (ref 80–100)
NRBC # BLD: 0 /100 WBCS — SIGNIFICANT CHANGE UP (ref 0–0)
NRBC # BLD: 0 /100 WBCS — SIGNIFICANT CHANGE UP (ref 0–0)
PHOSPHATE SERPL-MCNC: 3.5 MG/DL — SIGNIFICANT CHANGE UP (ref 2.5–4.5)
PHOSPHATE SERPL-MCNC: 3.8 MG/DL — SIGNIFICANT CHANGE UP (ref 2.5–4.5)
PLATELET # BLD AUTO: 262 K/UL — SIGNIFICANT CHANGE UP (ref 150–400)
PLATELET # BLD AUTO: 272 K/UL — SIGNIFICANT CHANGE UP (ref 150–400)
POTASSIUM SERPL-MCNC: 4.1 MMOL/L — SIGNIFICANT CHANGE UP (ref 3.5–5.3)
POTASSIUM SERPL-MCNC: 4.3 MMOL/L — SIGNIFICANT CHANGE UP (ref 3.5–5.3)
POTASSIUM SERPL-SCNC: 4.1 MMOL/L — SIGNIFICANT CHANGE UP (ref 3.5–5.3)
POTASSIUM SERPL-SCNC: 4.3 MMOL/L — SIGNIFICANT CHANGE UP (ref 3.5–5.3)
RBC # BLD: 2.67 M/UL — LOW (ref 3.8–5.2)
RBC # BLD: 2.75 M/UL — LOW (ref 3.8–5.2)
RBC # FLD: 13.8 % — SIGNIFICANT CHANGE UP (ref 10.3–14.5)
RBC # FLD: 14 % — SIGNIFICANT CHANGE UP (ref 10.3–14.5)
SODIUM SERPL-SCNC: 132 MMOL/L — LOW (ref 135–145)
SODIUM SERPL-SCNC: 134 MMOL/L — LOW (ref 135–145)
WBC # BLD: 11.79 K/UL — HIGH (ref 3.8–10.5)
WBC # BLD: 13.34 K/UL — HIGH (ref 3.8–10.5)
WBC # FLD AUTO: 11.79 K/UL — HIGH (ref 3.8–10.5)
WBC # FLD AUTO: 13.34 K/UL — HIGH (ref 3.8–10.5)

## 2019-12-18 PROCEDURE — 74270 X-RAY XM COLON 1CNTRST STD: CPT | Mod: 26

## 2019-12-18 PROCEDURE — 99232 SBSQ HOSP IP/OBS MODERATE 35: CPT | Mod: GC,24

## 2019-12-18 RX ADMIN — ALBUTEROL 1 PUFF(S): 90 AEROSOL, METERED ORAL at 18:21

## 2019-12-18 RX ADMIN — Medication 500 MILLIGRAM(S): at 12:47

## 2019-12-18 RX ADMIN — ALBUTEROL 1 PUFF(S): 90 AEROSOL, METERED ORAL at 05:06

## 2019-12-18 RX ADMIN — ATORVASTATIN CALCIUM 20 MILLIGRAM(S): 80 TABLET, FILM COATED ORAL at 21:07

## 2019-12-18 RX ADMIN — SPIRONOLACTONE 25 MILLIGRAM(S): 25 TABLET, FILM COATED ORAL at 05:06

## 2019-12-18 RX ADMIN — ALBUTEROL 1 PUFF(S): 90 AEROSOL, METERED ORAL at 12:47

## 2019-12-18 RX ADMIN — BUDESONIDE AND FORMOTEROL FUMARATE DIHYDRATE 2 PUFF(S): 160; 4.5 AEROSOL RESPIRATORY (INHALATION) at 05:06

## 2019-12-18 RX ADMIN — MAGNESIUM OXIDE 400 MG ORAL TABLET 400 MILLIGRAM(S): 241.3 TABLET ORAL at 12:48

## 2019-12-18 RX ADMIN — ALBUTEROL 1 PUFF(S): 90 AEROSOL, METERED ORAL at 21:07

## 2019-12-18 RX ADMIN — Medication 50 GRAM(S): at 14:25

## 2019-12-18 RX ADMIN — PANTOPRAZOLE SODIUM 40 MILLIGRAM(S): 20 TABLET, DELAYED RELEASE ORAL at 05:07

## 2019-12-18 RX ADMIN — MAGNESIUM OXIDE 400 MG ORAL TABLET 400 MILLIGRAM(S): 241.3 TABLET ORAL at 18:20

## 2019-12-18 RX ADMIN — Medication 1 MILLIGRAM(S): at 12:48

## 2019-12-18 RX ADMIN — BUDESONIDE AND FORMOTEROL FUMARATE DIHYDRATE 2 PUFF(S): 160; 4.5 AEROSOL RESPIRATORY (INHALATION) at 18:21

## 2019-12-18 RX ADMIN — ENOXAPARIN SODIUM 40 MILLIGRAM(S): 100 INJECTION SUBCUTANEOUS at 12:48

## 2019-12-18 RX ADMIN — MONTELUKAST 10 MILLIGRAM(S): 4 TABLET, CHEWABLE ORAL at 12:48

## 2019-12-18 RX ADMIN — CHLORHEXIDINE GLUCONATE 1 APPLICATION(S): 213 SOLUTION TOPICAL at 12:47

## 2019-12-18 NOTE — PROGRESS NOTE ADULT - SUBJECTIVE AND OBJECTIVE BOX
Chief Complaint:  Patient is a 74y old  Female who presents with a chief complaint of displaced IR drain (18 Dec 2019 04:33)      Interval Events:   s/p flex sig yesterday with identification of fistual s/p OTSC placement    Allergies:  Coreg (Other)  digoxin (Other; Short breath (Mild to Mod))  metoprolol (Other)  penicillins (Hives)  Solu-Medrol (Other (Mild to Mod))      Home Medications:    Hospital Medications:  acetaminophen   Tablet .. 650 milliGRAM(s) Oral every 6 hours PRN  ALBUTerol    90 MICROgram(s) HFA Inhaler 1 Puff(s) Inhalation every 4 hours  ascorbic acid 500 milliGRAM(s) Oral daily  atorvastatin 20 milliGRAM(s) Oral at bedtime  budesonide 160 MICROgram(s)/formoterol 4.5 MICROgram(s) Inhaler 2 Puff(s) Inhalation two times a day  chlorhexidine 4% Liquid 1 Application(s) Topical <User Schedule>  dextrose 40% Gel 15 Gram(s) Oral once PRN  dextrose 5% + sodium chloride 0.45% with potassium chloride 20 mEq/L 1000 milliLiter(s) IV Continuous <Continuous>  dextrose 5%. 1000 milliLiter(s) IV Continuous <Continuous>  dextrose 50% Injectable 12.5 Gram(s) IV Push once  dextrose 50% Injectable 25 Gram(s) IV Push once  dextrose 50% Injectable 25 Gram(s) IV Push once  enalapril 10 milliGRAM(s) Oral two times a day  enoxaparin Injectable 40 milliGRAM(s) SubCutaneous daily  folic acid 1 milliGRAM(s) Oral daily  furosemide   Oral Tab/Cap - Peds 40 milliGRAM(s) Oral two times a day  glucagon  Injectable 1 milliGRAM(s) IntraMuscular once PRN  guaifenesin/dextromethorphan  Syrup 10 milliLiter(s) Oral every 6 hours PRN  insulin lispro (HumaLOG) corrective regimen sliding scale   SubCutaneous Before meals and at bedtime  magnesium oxide 400 milliGRAM(s) Oral three times a day with meals  magnesium sulfate  IVPB 2 Gram(s) IV Intermittent daily  montelukast 10 milliGRAM(s) Oral daily  pantoprazole    Tablet 40 milliGRAM(s) Oral before breakfast  sodium chloride 0.9% lock flush 10 milliLiter(s) IV Push every 1 hour PRN  spironolactone 25 milliGRAM(s) Oral daily      PMHX/PSHX:  Refusal of blood transfusions as patient is Bahai  Patient is Bahai  Vertigo  Atrial flutter  Diverticulitis  Cardiomyopathy  Kidney stone  COPD (chronic obstructive pulmonary disease)  Cardiac Pacemaker  HTN - Hypertension  Gout  Diabetes  Congestive Heart Failure  Asthma  Status post left hemicolectomy  S/P cholecystectomy  AICD (Automatic Cardioverter/Defibrillator) Present  S/P Cholecystectomy      Family history:  Family history of brain tumor  No pertinent family history in first degree relatives      ROS:     General:  No wt loss, fevers, chills, night sweats, fatigue,   Eyes:  Good vision, no reported pain  ENT:  No sore throat, pain, runny nose, dysphagia  CV:  No pain, palpitations, hypo/hypertension  Resp:  No dyspnea, cough, tachypnea, wheezing  GI:  No pain, No nausea, No vomiting, No diarrhea, No constipation, No weight loss, No fever, No pruritis, No rectal bleeding, No tarry stools, No dysphagia,  :  No pain, bleeding, incontinence, nocturia  Muscle:  No pain, weakness  Neuro:  No weakness, tingling, memory problems  Psych:  No fatigue, insomnia, mood problems, depression  Endocrine:  No polyuria, polydipsia, cold/heat intolerance  Heme:  No petechiae, ecchymosis, easy bruisability  Skin:  No rash, tattoos, scars, edema      PHYSICAL EXAM:   Vital Signs:  Vital Signs Last 24 Hrs  T(C): 36.9 (18 Dec 2019 04:57), Max: 36.9 (18 Dec 2019 02:09)  T(F): 98.4 (18 Dec 2019 04:57), Max: 98.5 (18 Dec 2019 02:09)  HR: 96 (18 Dec 2019 04:57) (83 - 96)  BP: 106/65 (18 Dec 2019 04:57) (102/57 - 132/76)  BP(mean): --  RR: 18 (18 Dec 2019 04:57) (18 - 18)  SpO2: 95% (18 Dec 2019 04:57) (93% - 98%)  Daily     Daily     GENERAL:  Appears stated age, well-groomed  HEENT:  NC/AT,  conjunctivae clear and pink  CHEST:  Full & symmetric excursion, no increased effort, breath sounds clear  HEART:  Regular rhythm, S1, S2, no murmur/rub/S3/S4  ABDOMEN:  Soft, non-tender, non-distended, normoactive bowel sounds  EXTEREMITIES:  no cyanosis,clubbing or edema  SKIN:  No rash/erythema/ecchymoses  NEURO:  Alert, oriented, no asterixis  LABS:                        9.0    13.34 )-----------( 262      ( 18 Dec 2019 07:13 )             28.1     12-18    134<L>  |  100  |  5<L>  ----------------------------<  130<H>  4.1   |  22  |  0.75    Ca    9.0      18 Dec 2019 07:08  Phos  3.8     12-18  Mg     1.8     12-18                Imaging:            Chief Complaint:  Patient is a 74y old  Female who presents with a chief complaint of displaced IR drain (18 Dec 2019 04:33)      Interval Events:     Allergies:  Coreg (Other)  digoxin (Other; Short breath (Mild to Mod))  metoprolol (Other)  penicillins (Hives)  Solu-Medrol (Other (Mild to Mod))      Home Medications:    Hospital Medications:  acetaminophen   Tablet .. 650 milliGRAM(s) Oral every 6 hours PRN  ALBUTerol    90 MICROgram(s) HFA Inhaler 1 Puff(s) Inhalation every 4 hours  ascorbic acid 500 milliGRAM(s) Oral daily  atorvastatin 20 milliGRAM(s) Oral at bedtime  budesonide 160 MICROgram(s)/formoterol 4.5 MICROgram(s) Inhaler 2 Puff(s) Inhalation two times a day  chlorhexidine 4% Liquid 1 Application(s) Topical <User Schedule>  dextrose 40% Gel 15 Gram(s) Oral once PRN  dextrose 5% + sodium chloride 0.45% with potassium chloride 20 mEq/L 1000 milliLiter(s) IV Continuous <Continuous>  dextrose 5%. 1000 milliLiter(s) IV Continuous <Continuous>  dextrose 50% Injectable 12.5 Gram(s) IV Push once  dextrose 50% Injectable 25 Gram(s) IV Push once  dextrose 50% Injectable 25 Gram(s) IV Push once  enalapril 10 milliGRAM(s) Oral two times a day  enoxaparin Injectable 40 milliGRAM(s) SubCutaneous daily  folic acid 1 milliGRAM(s) Oral daily  furosemide   Oral Tab/Cap - Peds 40 milliGRAM(s) Oral two times a day  glucagon  Injectable 1 milliGRAM(s) IntraMuscular once PRN  guaifenesin/dextromethorphan  Syrup 10 milliLiter(s) Oral every 6 hours PRN  insulin lispro (HumaLOG) corrective regimen sliding scale   SubCutaneous Before meals and at bedtime  magnesium oxide 400 milliGRAM(s) Oral three times a day with meals  magnesium sulfate  IVPB 2 Gram(s) IV Intermittent daily  montelukast 10 milliGRAM(s) Oral daily  pantoprazole    Tablet 40 milliGRAM(s) Oral before breakfast  sodium chloride 0.9% lock flush 10 milliLiter(s) IV Push every 1 hour PRN  spironolactone 25 milliGRAM(s) Oral daily      PMHX/PSHX:  Refusal of blood transfusions as patient is Bahai  Patient is Bahai  Vertigo  Atrial flutter  Diverticulitis  Cardiomyopathy  Kidney stone  COPD (chronic obstructive pulmonary disease)  Cardiac Pacemaker  HTN - Hypertension  Gout  Diabetes  Congestive Heart Failure  Asthma  Status post left hemicolectomy  S/P cholecystectomy  AICD (Automatic Cardioverter/Defibrillator) Present  S/P Cholecystectomy      Family history:  Family history of brain tumor  No pertinent family history in first degree relatives      ROS:     General:  No wt loss, fevers, chills, night sweats, fatigue,   Eyes:  Good vision, no reported pain  ENT:  No sore throat, pain, runny nose, dysphagia  CV:  No pain, palpitations, hypo/hypertension  Resp:  No dyspnea, cough, tachypnea, wheezing  GI:  No pain, No nausea, No vomiting, No diarrhea, No constipation, No weight loss, No fever, No pruritis, No rectal bleeding, No tarry stools, No dysphagia,  :  No pain, bleeding, incontinence, nocturia  Muscle:  No pain, weakness  Neuro:  No weakness, tingling, memory problems  Psych:  No fatigue, insomnia, mood problems, depression  Endocrine:  No polyuria, polydipsia, cold/heat intolerance  Heme:  No petechiae, ecchymosis, easy bruisability  Skin:  No rash, tattoos, scars, edema      PHYSICAL EXAM:   Vital Signs:  Vital Signs Last 24 Hrs  T(C): 36.9 (18 Dec 2019 04:57), Max: 36.9 (18 Dec 2019 02:09)  T(F): 98.4 (18 Dec 2019 04:57), Max: 98.5 (18 Dec 2019 02:09)  HR: 96 (18 Dec 2019 04:57) (83 - 96)  BP: 106/65 (18 Dec 2019 04:57) (102/57 - 132/76)  BP(mean): --  RR: 18 (18 Dec 2019 04:57) (18 - 18)  SpO2: 95% (18 Dec 2019 04:57) (93% - 98%)  Daily     Daily     GENERAL: NAD  HEENT:  slcera anicteric  CHEST:  Full & symmetric excursion  HEART:  Regular rhythm, S1, S2  ABDOMEN:  Soft, non-tender, non-distended  EXTEREMITIES:  no cyanosis,clubbing or edema  SKIN:  No rash/erythema/ecchymoses/petechiae/wounds/abscess/warm/dry  NEURO:  Alert, oriented    LABS:                        9.0    13.34 )-----------( 262      ( 18 Dec 2019 07:13 )             28.1     12-18    134<L>  |  100  |  5<L>  ----------------------------<  130<H>  4.1   |  22  |  0.75    Ca    9.0      18 Dec 2019 07:08  Phos  3.8     12-18  Mg     1.8     12-18                Imaging: Chief Complaint:  Patient is a 74y old  Female who presents with a chief complaint of displaced IR drain (18 Dec 2019 04:33)      Interval Events:   s/p flex sig yesterday with identification of fistual s/p OTSC placement  No complaints this AM    Allergies:  Coreg (Other)  digoxin (Other; Short breath (Mild to Mod))  metoprolol (Other)  penicillins (Hives)  Solu-Medrol (Other (Mild to Mod))      Home Medications:    Hospital Medications:  acetaminophen   Tablet .. 650 milliGRAM(s) Oral every 6 hours PRN  ALBUTerol    90 MICROgram(s) HFA Inhaler 1 Puff(s) Inhalation every 4 hours  ascorbic acid 500 milliGRAM(s) Oral daily  atorvastatin 20 milliGRAM(s) Oral at bedtime  budesonide 160 MICROgram(s)/formoterol 4.5 MICROgram(s) Inhaler 2 Puff(s) Inhalation two times a day  chlorhexidine 4% Liquid 1 Application(s) Topical <User Schedule>  dextrose 40% Gel 15 Gram(s) Oral once PRN  dextrose 5% + sodium chloride 0.45% with potassium chloride 20 mEq/L 1000 milliLiter(s) IV Continuous <Continuous>  dextrose 5%. 1000 milliLiter(s) IV Continuous <Continuous>  dextrose 50% Injectable 12.5 Gram(s) IV Push once  dextrose 50% Injectable 25 Gram(s) IV Push once  dextrose 50% Injectable 25 Gram(s) IV Push once  enalapril 10 milliGRAM(s) Oral two times a day  enoxaparin Injectable 40 milliGRAM(s) SubCutaneous daily  folic acid 1 milliGRAM(s) Oral daily  furosemide   Oral Tab/Cap - Peds 40 milliGRAM(s) Oral two times a day  glucagon  Injectable 1 milliGRAM(s) IntraMuscular once PRN  guaifenesin/dextromethorphan  Syrup 10 milliLiter(s) Oral every 6 hours PRN  insulin lispro (HumaLOG) corrective regimen sliding scale   SubCutaneous Before meals and at bedtime  magnesium oxide 400 milliGRAM(s) Oral three times a day with meals  magnesium sulfate  IVPB 2 Gram(s) IV Intermittent daily  montelukast 10 milliGRAM(s) Oral daily  pantoprazole    Tablet 40 milliGRAM(s) Oral before breakfast  sodium chloride 0.9% lock flush 10 milliLiter(s) IV Push every 1 hour PRN  spironolactone 25 milliGRAM(s) Oral daily      PMHX/PSHX:  Refusal of blood transfusions as patient is Uatsdin  Patient is Uatsdin  Vertigo  Atrial flutter  Diverticulitis  Cardiomyopathy  Kidney stone  COPD (chronic obstructive pulmonary disease)  Cardiac Pacemaker  HTN - Hypertension  Gout  Diabetes  Congestive Heart Failure  Asthma  Status post left hemicolectomy  S/P cholecystectomy  AICD (Automatic Cardioverter/Defibrillator) Present  S/P Cholecystectomy      Family history:  Family history of brain tumor  No pertinent family history in first degree relatives      ROS:     General:  No wt loss, fevers, chills, night sweats, fatigue,   Eyes:  Good vision, no reported pain  ENT:  No sore throat, pain, runny nose, dysphagia  CV:  No pain, palpitations, hypo/hypertension  Resp:  No dyspnea, cough, tachypnea, wheezing  GI:  No pain, No nausea, No vomiting, No diarrhea, No constipation, No weight loss, No fever, No pruritis, No rectal bleeding, No tarry stools, No dysphagia,  :  No pain, bleeding, incontinence, nocturia  Muscle:  No pain, weakness  Neuro:  No weakness, tingling, memory problems  Psych:  No fatigue, insomnia, mood problems, depression  Endocrine:  No polyuria, polydipsia, cold/heat intolerance  Heme:  No petechiae, ecchymosis, easy bruisability  Skin:  No rash, tattoos, scars, edema      PHYSICAL EXAM:   Vital Signs:  Vital Signs Last 24 Hrs  T(C): 36.9 (18 Dec 2019 04:57), Max: 36.9 (18 Dec 2019 02:09)  T(F): 98.4 (18 Dec 2019 04:57), Max: 98.5 (18 Dec 2019 02:09)  HR: 96 (18 Dec 2019 04:57) (83 - 96)  BP: 106/65 (18 Dec 2019 04:57) (102/57 - 132/76)  BP(mean): --  RR: 18 (18 Dec 2019 04:57) (18 - 18)  SpO2: 95% (18 Dec 2019 04:57) (93% - 98%)  Daily     Daily     GENERAL:  Appears stated age, well-groomed  HEENT:  NC/AT,  conjunctivae clear and pink  CHEST:  Full & symmetric excursion, no increased effort, breath sounds clear  HEART:  Regular rhythm, S1, S2, no murmur/rub/S3/S4  ABDOMEN:  Soft, non-tender, non-distended, normoactive bowel sounds  EXTEREMITIES:  no cyanosis,clubbing or edema  SKIN:  No rash/erythema/ecchymoses  NEURO:  Alert, oriented, no asterixis  LABS:                        9.0    13.34 )-----------( 262      ( 18 Dec 2019 07:13 )             28.1     12-18    134<L>  |  100  |  5<L>  ----------------------------<  130<H>  4.1   |  22  |  0.75    Ca    9.0      18 Dec 2019 07:08  Phos  3.8     12-18  Mg     1.8     12-18                Imaging:            Chief Complaint:  Patient is a 74y old  Female who presents with a chief complaint of displaced IR drain (18 Dec 2019 04:33)      Interval Events:     Allergies:  Coreg (Other)  digoxin (Other; Short breath (Mild to Mod))  metoprolol (Other)  penicillins (Hives)  Solu-Medrol (Other (Mild to Mod))      Home Medications:    Hospital Medications:  acetaminophen   Tablet .. 650 milliGRAM(s) Oral every 6 hours PRN  ALBUTerol    90 MICROgram(s) HFA Inhaler 1 Puff(s) Inhalation every 4 hours  ascorbic acid 500 milliGRAM(s) Oral daily  atorvastatin 20 milliGRAM(s) Oral at bedtime  budesonide 160 MICROgram(s)/formoterol 4.5 MICROgram(s) Inhaler 2 Puff(s) Inhalation two times a day  chlorhexidine 4% Liquid 1 Application(s) Topical <User Schedule>  dextrose 40% Gel 15 Gram(s) Oral once PRN  dextrose 5% + sodium chloride 0.45% with potassium chloride 20 mEq/L 1000 milliLiter(s) IV Continuous <Continuous>  dextrose 5%. 1000 milliLiter(s) IV Continuous <Continuous>  dextrose 50% Injectable 12.5 Gram(s) IV Push once  dextrose 50% Injectable 25 Gram(s) IV Push once  dextrose 50% Injectable 25 Gram(s) IV Push once  enalapril 10 milliGRAM(s) Oral two times a day  enoxaparin Injectable 40 milliGRAM(s) SubCutaneous daily  folic acid 1 milliGRAM(s) Oral daily  furosemide   Oral Tab/Cap - Peds 40 milliGRAM(s) Oral two times a day  glucagon  Injectable 1 milliGRAM(s) IntraMuscular once PRN  guaifenesin/dextromethorphan  Syrup 10 milliLiter(s) Oral every 6 hours PRN  insulin lispro (HumaLOG) corrective regimen sliding scale   SubCutaneous Before meals and at bedtime  magnesium oxide 400 milliGRAM(s) Oral three times a day with meals  magnesium sulfate  IVPB 2 Gram(s) IV Intermittent daily  montelukast 10 milliGRAM(s) Oral daily  pantoprazole    Tablet 40 milliGRAM(s) Oral before breakfast  sodium chloride 0.9% lock flush 10 milliLiter(s) IV Push every 1 hour PRN  spironolactone 25 milliGRAM(s) Oral daily      PMHX/PSHX:  Refusal of blood transfusions as patient is Uatsdin  Patient is Uatsdin  Vertigo  Atrial flutter  Diverticulitis  Cardiomyopathy  Kidney stone  COPD (chronic obstructive pulmonary disease)  Cardiac Pacemaker  HTN - Hypertension  Gout  Diabetes  Congestive Heart Failure  Asthma  Status post left hemicolectomy  S/P cholecystectomy  AICD (Automatic Cardioverter/Defibrillator) Present  S/P Cholecystectomy      Family history:  Family history of brain tumor  No pertinent family history in first degree relatives      ROS:     General:  No wt loss, fevers, chills, night sweats, fatigue,   Eyes:  Good vision, no reported pain  ENT:  No sore throat, pain, runny nose, dysphagia  CV:  No pain, palpitations, hypo/hypertension  Resp:  No dyspnea, cough, tachypnea, wheezing  GI:  No pain, No nausea, No vomiting, No diarrhea, No constipation, No weight loss, No fever, No pruritis, No rectal bleeding, No tarry stools, No dysphagia,  :  No pain, bleeding, incontinence, nocturia  Muscle:  No pain, weakness  Neuro:  No weakness, tingling, memory problems  Psych:  No fatigue, insomnia, mood problems, depression  Endocrine:  No polyuria, polydipsia, cold/heat intolerance  Heme:  No petechiae, ecchymosis, easy bruisability  Skin:  No rash, tattoos, scars, edema      PHYSICAL EXAM:   Vital Signs:  Vital Signs Last 24 Hrs  T(C): 36.9 (18 Dec 2019 04:57), Max: 36.9 (18 Dec 2019 02:09)  T(F): 98.4 (18 Dec 2019 04:57), Max: 98.5 (18 Dec 2019 02:09)  HR: 96 (18 Dec 2019 04:57) (83 - 96)  BP: 106/65 (18 Dec 2019 04:57) (102/57 - 132/76)  BP(mean): --  RR: 18 (18 Dec 2019 04:57) (18 - 18)  SpO2: 95% (18 Dec 2019 04:57) (93% - 98%)  Daily     Daily     GENERAL: NAD  HEENT:  slcera anicteric  CHEST:  Full & symmetric excursion  HEART:  Regular rhythm, S1, S2  ABDOMEN:  Soft, non-tender, non-distended  EXTEREMITIES:  no cyanosis,clubbing or edema  SKIN:  No rash/erythema/ecchymoses/petechiae/wounds/abscess/warm/dry  NEURO:  Alert, oriented    LABS:                        9.0    13.34 )-----------( 262      ( 18 Dec 2019 07:13 )             28.1     12-18    134<L>  |  100  |  5<L>  ----------------------------<  130<H>  4.1   |  22  |  0.75    Ca    9.0      18 Dec 2019 07:08  Phos  3.8     12-18  Mg     1.8     12-18                Imaging:

## 2019-12-18 NOTE — PROGRESS NOTE ADULT - ASSESSMENT
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 Ongoing sigmoid stump leak on CT with rectal contrast, medial drain removed, left drain (attached to gravity bag) dislodged with minor leakage around site. Now s/p drain revision/replacement by IR 12/16. Now s/p sigmoidoscopy with OTSC deployment at site of dehiscence     Plan:  - Diet: Regular  - Follow IR drains, percutaneous drain seen at fistula site prior to clip placement  - Pain control as needed  - c/w abx: cipro, flagyl.   - DVT ppx  - OOB and ambulating as tolerated  - F/u AM labs.    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 Ongoing sigmoid stump leak on CT with rectal contrast, medial drain removed, left drain (attached to gravity bag) dislodged with minor leakage around site. Now s/p drain revision/replacement by IR 12/16. Now s/p sigmoidoscopy with OTSC deployment at site of dehiscence     Plan:  - Diet: Regular  - Barium enema  -CM for return to rehab  - Follow IR drains, percutaneous drain seen at fistula site prior to clip placement  - Pain control as needed  - c/w abx: cipro, flagyl.   - DVT ppx  - OOB and ambulating as tolerated  - F/u AM labs.    [facility]  [id number]

## 2019-12-18 NOTE — PROGRESS NOTE ADULT - ATTENDING COMMENTS
I saw and examined the pt and discussed the tx plan with the House Staff. I agree with the exam and plan as documented in the surgery resident's note from today.  Appreciate GI input, clip placed yesterday at the leak site, will obtain a hypaque enema today.  Tracy Jay MD The pt was getting her enema study when I went to see her this morning.  Appreciate GI input, clip placed yesterday at the leak site, hypaque enema today.  Tracy Jay MD

## 2019-12-18 NOTE — PROVIDER CONTACT NOTE (OTHER) - ACTION/TREATMENT ORDERED:
Continue to monitor wound for worsening status. [facility] surgery team to evaluate patient at bedside. Continue IV abx. no

## 2019-12-18 NOTE — PROGRESS NOTE ADULT - ASSESSMENT
Impression:  1) Leakage from Delgado stump  s/p flex sig yesterday with identification of fistual s/p OTSC placement  2) Abdominal collection    Recommendations:  - please obtain barium enema today to evaluate for ongoing fistula  - Follow tube output, and can do tube study in the future.  - rest of plan as per surgical team

## 2019-12-18 NOTE — PROGRESS NOTE ADULT - SUBJECTIVE AND OBJECTIVE BOX
GENERAL SURGERY PROGRESS NOTE    SUBJECTIVE  Patient seen and examined. No acute events overnight. Reports tolerating diet without nausea, vomiting, +/+ ostomy, voiding without issues. Denies fever, chills, SOB, chest pain. Patient underwent sigmoidoscopy yesterday with no complications.    OBJECTIVE  PHYSICAL EXAM  General: Appears well, NAD  CHEST: breathing comfortably  CV: appears well perfused  Abdomen: soft, nontender, nondistended, no rebound or guarding, ostomy pink +/+, fistula minimal output, L drain no output  Extremities: Grossly symmetric  Ostomy: pink and viable, gas and stool in bag    V/S:  Vital Signs Last 24 Hrs  T(C): 36.9 (18 Dec 2019 02:09), Max: 36.9 (18 Dec 2019 02:09)  T(F): 98.5 (18 Dec 2019 02:09), Max: 98.5 (18 Dec 2019 02:09)  HR: 91 (18 Dec 2019 02:09) (83 - 93)  BP: 132/76 (18 Dec 2019 02:09) (101/64 - 132/76)  BP(mean): --  RR: 18 (18 Dec 2019 02:09) (18 - 18)  SpO2: 97% (18 Dec 2019 02:09) (93% - 98%)    --------------------------------------------------------------------------------------------------  I/Os:    16 Dec 2019 07:01  -  17 Dec 2019 07:00  --------------------------------------------------------  IN:    dextrose 5% + sodium chloride 0.45% with potassium chloride 20 mEq/L: 1350 mL    Oral Fluid: 380 mL  Total IN: 1730 mL    OUT:    Colostomy: 350 mL    Voided: 2400 mL  Total OUT: 2750 mL    Total NET: -1020 mL      17 Dec 2019 07:01  -  18 Dec 2019 04:34  --------------------------------------------------------  IN:    dextrose 5% + sodium chloride 0.45% with potassium chloride 20 mEq/L: 450 mL    Oral Fluid: 180 mL  Total IN: 630 mL    OUT:    Colostomy: 150 mL    Voided: 250 mL  Total OUT: 400 mL    Total NET: 230 mL        --------------------------------------------------------------------------------------------------  LABS:                        9.1    8.77  )-----------( 252      ( 17 Dec 2019 08:56 )             28.4     17 Dec 2019 07:40    137    |  100    |  <4     ----------------------------<  121    4.3     |  23     |  0.66     Ca    9.3        17 Dec 2019 07:40  Phos  4.6       17 Dec 2019 07:40  Mg     1.9       17 Dec 2019 07:40      PT/INR - ( 16 Dec 2019 07:19 )   PT: 15.0 sec;   INR: 1.31 ratio         PTT - ( 16 Dec 2019 07:19 )  PTT:33.0 sec  CAPILLARY BLOOD GLUCOSE      POCT Blood Glucose.: 101 mg/dL (17 Dec 2019 21:15)  POCT Blood Glucose.: 123 mg/dL (17 Dec 2019 18:16)  POCT Blood Glucose.: 137 mg/dL (17 Dec 2019 12:11)    --------------------------------------------------------------------------------------------------  MEDICATIONS  (STANDING):  ALBUTerol    90 MICROgram(s) HFA Inhaler 1 Puff(s) Inhalation every 4 hours  ascorbic acid 500 milliGRAM(s) Oral daily  atorvastatin 20 milliGRAM(s) Oral at bedtime  budesonide 160 MICROgram(s)/formoterol 4.5 MICROgram(s) Inhaler 2 Puff(s) Inhalation two times a day  chlorhexidine 4% Liquid 1 Application(s) Topical <User Schedule>  dextrose 5% + sodium chloride 0.45% with potassium chloride 20 mEq/L 1000 milliLiter(s) (75 mL/Hr) IV Continuous <Continuous>  dextrose 5%. 1000 milliLiter(s) (50 mL/Hr) IV Continuous <Continuous>  dextrose 50% Injectable 12.5 Gram(s) IV Push once  dextrose 50% Injectable 25 Gram(s) IV Push once  dextrose 50% Injectable 25 Gram(s) IV Push once  enalapril 10 milliGRAM(s) Oral two times a day  enoxaparin Injectable 40 milliGRAM(s) SubCutaneous daily  folic acid 1 milliGRAM(s) Oral daily  furosemide   Oral Tab/Cap - Peds 40 milliGRAM(s) Oral two times a day  insulin lispro (HumaLOG) corrective regimen sliding scale   SubCutaneous Before meals and at bedtime  magnesium oxide 400 milliGRAM(s) Oral three times a day with meals  magnesium sulfate  IVPB 2 Gram(s) IV Intermittent daily  montelukast 10 milliGRAM(s) Oral daily  pantoprazole    Tablet 40 milliGRAM(s) Oral before breakfast  spironolactone 25 milliGRAM(s) Oral daily    MEDICATIONS  (PRN):  acetaminophen   Tablet .. 650 milliGRAM(s) Oral every 6 hours PRN Mild Pain (1 - 3), Moderate Pain (4 - 6)  dextrose 40% Gel 15 Gram(s) Oral once PRN Blood Glucose LESS THAN 70 milliGRAM(s)/deciliter  glucagon  Injectable 1 milliGRAM(s) IntraMuscular once PRN Glucose LESS THAN 70 milligrams/deciliter  guaifenesin/dextromethorphan  Syrup 10 milliLiter(s) Oral every 6 hours PRN Cough  sodium chloride 0.9% lock flush 10 milliLiter(s) IV Push every 1 hour PRN Pre/post blood products, medications, blood draw, and to maintain line patency

## 2019-12-19 ENCOUNTER — TRANSCRIPTION ENCOUNTER (OUTPATIENT)
Age: 74
End: 2019-12-19

## 2019-12-19 VITALS
RESPIRATION RATE: 18 BRPM | TEMPERATURE: 98 F | DIASTOLIC BLOOD PRESSURE: 73 MMHG | HEART RATE: 99 BPM | SYSTOLIC BLOOD PRESSURE: 115 MMHG | OXYGEN SATURATION: 95 %

## 2019-12-19 LAB
ANION GAP SERPL CALC-SCNC: 15 MMOL/L — SIGNIFICANT CHANGE UP (ref 5–17)
BUN SERPL-MCNC: 4 MG/DL — LOW (ref 7–23)
CALCIUM SERPL-MCNC: 9 MG/DL — SIGNIFICANT CHANGE UP (ref 8.4–10.5)
CHLORIDE SERPL-SCNC: 101 MMOL/L — SIGNIFICANT CHANGE UP (ref 96–108)
CO2 SERPL-SCNC: 23 MMOL/L — SIGNIFICANT CHANGE UP (ref 22–31)
CREAT SERPL-MCNC: 0.68 MG/DL — SIGNIFICANT CHANGE UP (ref 0.5–1.3)
GLUCOSE BLDC GLUCOMTR-MCNC: 136 MG/DL — HIGH (ref 70–99)
GLUCOSE BLDC GLUCOMTR-MCNC: 151 MG/DL — HIGH (ref 70–99)
GLUCOSE SERPL-MCNC: 121 MG/DL — HIGH (ref 70–99)
HCT VFR BLD CALC: 27.7 % — LOW (ref 34.5–45)
HGB BLD-MCNC: 9 G/DL — LOW (ref 11.5–15.5)
MAGNESIUM SERPL-MCNC: 1.8 MG/DL — SIGNIFICANT CHANGE UP (ref 1.6–2.6)
MCHC RBC-ENTMCNC: 32.5 GM/DL — SIGNIFICANT CHANGE UP (ref 32–36)
MCHC RBC-ENTMCNC: 34 PG — SIGNIFICANT CHANGE UP (ref 27–34)
MCV RBC AUTO: 104.5 FL — HIGH (ref 80–100)
NRBC # BLD: 0 /100 WBCS — SIGNIFICANT CHANGE UP (ref 0–0)
PHOSPHATE SERPL-MCNC: 3.7 MG/DL — SIGNIFICANT CHANGE UP (ref 2.5–4.5)
PLATELET # BLD AUTO: 259 K/UL — SIGNIFICANT CHANGE UP (ref 150–400)
POTASSIUM SERPL-MCNC: 4.1 MMOL/L — SIGNIFICANT CHANGE UP (ref 3.5–5.3)
POTASSIUM SERPL-SCNC: 4.1 MMOL/L — SIGNIFICANT CHANGE UP (ref 3.5–5.3)
RBC # BLD: 2.65 M/UL — LOW (ref 3.8–5.2)
RBC # FLD: 14.1 % — SIGNIFICANT CHANGE UP (ref 10.3–14.5)
SODIUM SERPL-SCNC: 139 MMOL/L — SIGNIFICANT CHANGE UP (ref 135–145)
WBC # BLD: 10.99 K/UL — HIGH (ref 3.8–10.5)
WBC # FLD AUTO: 10.99 K/UL — HIGH (ref 3.8–10.5)

## 2019-12-19 PROCEDURE — 96374 THER/PROPH/DIAG INJ IV PUSH: CPT | Mod: XU

## 2019-12-19 PROCEDURE — 97161 PT EVAL LOW COMPLEX 20 MIN: CPT

## 2019-12-19 PROCEDURE — 74177 CT ABD & PELVIS W/CONTRAST: CPT

## 2019-12-19 PROCEDURE — 82962 GLUCOSE BLOOD TEST: CPT

## 2019-12-19 PROCEDURE — 82803 BLOOD GASES ANY COMBINATION: CPT

## 2019-12-19 PROCEDURE — 82330 ASSAY OF CALCIUM: CPT

## 2019-12-19 PROCEDURE — 82435 ASSAY OF BLOOD CHLORIDE: CPT

## 2019-12-19 PROCEDURE — 71045 X-RAY EXAM CHEST 1 VIEW: CPT

## 2019-12-19 PROCEDURE — C1889: CPT

## 2019-12-19 PROCEDURE — 74270 X-RAY XM COLON 1CNTRST STD: CPT

## 2019-12-19 PROCEDURE — 85027 COMPLETE CBC AUTOMATED: CPT

## 2019-12-19 PROCEDURE — 85014 HEMATOCRIT: CPT

## 2019-12-19 PROCEDURE — 80048 BASIC METABOLIC PNL TOTAL CA: CPT

## 2019-12-19 PROCEDURE — 85610 PROTHROMBIN TIME: CPT

## 2019-12-19 PROCEDURE — 83690 ASSAY OF LIPASE: CPT

## 2019-12-19 PROCEDURE — C1769: CPT

## 2019-12-19 PROCEDURE — 80053 COMPREHEN METABOLIC PANEL: CPT

## 2019-12-19 PROCEDURE — 85730 THROMBOPLASTIN TIME PARTIAL: CPT

## 2019-12-19 PROCEDURE — 86901 BLOOD TYPING SEROLOGIC RH(D): CPT

## 2019-12-19 PROCEDURE — 94640 AIRWAY INHALATION TREATMENT: CPT

## 2019-12-19 PROCEDURE — 86900 BLOOD TYPING SEROLOGIC ABO: CPT

## 2019-12-19 PROCEDURE — 97530 THERAPEUTIC ACTIVITIES: CPT

## 2019-12-19 PROCEDURE — 84132 ASSAY OF SERUM POTASSIUM: CPT

## 2019-12-19 PROCEDURE — 83735 ASSAY OF MAGNESIUM: CPT

## 2019-12-19 PROCEDURE — 97116 GAIT TRAINING THERAPY: CPT

## 2019-12-19 PROCEDURE — 84100 ASSAY OF PHOSPHORUS: CPT

## 2019-12-19 PROCEDURE — 83605 ASSAY OF LACTIC ACID: CPT

## 2019-12-19 PROCEDURE — 82947 ASSAY GLUCOSE BLOOD QUANT: CPT

## 2019-12-19 PROCEDURE — 86850 RBC ANTIBODY SCREEN: CPT

## 2019-12-19 PROCEDURE — 49423 EXCHANGE DRAINAGE CATHETER: CPT

## 2019-12-19 PROCEDURE — C1729: CPT

## 2019-12-19 PROCEDURE — 99285 EMERGENCY DEPT VISIT HI MDM: CPT | Mod: 25

## 2019-12-19 PROCEDURE — 84295 ASSAY OF SERUM SODIUM: CPT

## 2019-12-19 PROCEDURE — 75984 XRAY CONTROL CATHETER CHANGE: CPT

## 2019-12-19 RX ORDER — MAGNESIUM SULFATE 500 MG/ML
2 VIAL (ML) INJECTION ONCE
Refills: 0 | Status: DISCONTINUED | OUTPATIENT
Start: 2019-12-19 | End: 2019-12-19

## 2019-12-19 RX ADMIN — SPIRONOLACTONE 25 MILLIGRAM(S): 25 TABLET, FILM COATED ORAL at 06:12

## 2019-12-19 RX ADMIN — ENOXAPARIN SODIUM 40 MILLIGRAM(S): 100 INJECTION SUBCUTANEOUS at 11:50

## 2019-12-19 RX ADMIN — ALBUTEROL 1 PUFF(S): 90 AEROSOL, METERED ORAL at 09:01

## 2019-12-19 RX ADMIN — CHLORHEXIDINE GLUCONATE 1 APPLICATION(S): 213 SOLUTION TOPICAL at 09:00

## 2019-12-19 RX ADMIN — Medication 500 MILLIGRAM(S): at 11:50

## 2019-12-19 RX ADMIN — Medication 1 MILLIGRAM(S): at 11:50

## 2019-12-19 RX ADMIN — Medication 40 MILLIGRAM(S): at 06:12

## 2019-12-19 RX ADMIN — MAGNESIUM OXIDE 400 MG ORAL TABLET 400 MILLIGRAM(S): 241.3 TABLET ORAL at 09:00

## 2019-12-19 RX ADMIN — Medication 1: at 09:00

## 2019-12-19 RX ADMIN — MONTELUKAST 10 MILLIGRAM(S): 4 TABLET, CHEWABLE ORAL at 11:50

## 2019-12-19 RX ADMIN — MAGNESIUM OXIDE 400 MG ORAL TABLET 400 MILLIGRAM(S): 241.3 TABLET ORAL at 11:50

## 2019-12-19 RX ADMIN — Medication 10 MILLIGRAM(S): at 06:13

## 2019-12-19 RX ADMIN — PANTOPRAZOLE SODIUM 40 MILLIGRAM(S): 20 TABLET, DELAYED RELEASE ORAL at 06:13

## 2019-12-19 RX ADMIN — Medication 10 MILLILITER(S): at 06:14

## 2019-12-19 RX ADMIN — ALBUTEROL 1 PUFF(S): 90 AEROSOL, METERED ORAL at 06:13

## 2019-12-19 RX ADMIN — BUDESONIDE AND FORMOTEROL FUMARATE DIHYDRATE 2 PUFF(S): 160; 4.5 AEROSOL RESPIRATORY (INHALATION) at 06:13

## 2019-12-19 NOTE — DISCHARGE NOTE PROVIDER - NSDCCPCAREPLAN_GEN_ALL_CORE_FT
PRINCIPAL DISCHARGE DIAGNOSIS  Diagnosis: Surgical complication  Assessment and Plan of Treatment: CT iamging revealed rectal stump leak, now s/p IR drain placement.

## 2019-12-19 NOTE — PROGRESS NOTE ADULT - SUBJECTIVE AND OBJECTIVE BOX
GENERAL SURGERY PROGRESS NOTE    SUBJECTIVE  Patient seen and examined. No acute events overnight. Reports tolerating diet without nausea, vomiting, +/+ ostomy, voiding without issues. Denies fever, chills, SOB, chest pain. Patient underwent Hypaque enema yesterday with no complications    OBJECTIVE  PHYSICAL EXAM  General: Appears well, NAD  CHEST: breathing comfortably  CV: appears well perfused  Abdomen: soft, nontender, nondistended, no rebound or guarding, ostomy pink +/+, fistula minimal output, L drain no output  Extremities: Grossly symmetric  Ostomy: pink and viable, gas and stool in bag    V/S:  Vital Signs Last 24 Hrs  T(C): 37.1 (19 Dec 2019 01:14), Max: 37.1 (18 Dec 2019 13:54)  T(F): 98.7 (19 Dec 2019 01:14), Max: 98.7 (18 Dec 2019 13:54)  HR: 97 (19 Dec 2019 01:14) (83 - 97)  BP: 121/71 (19 Dec 2019 01:14) (100/57 - 121/71)  BP(mean): --  RR: 17 (19 Dec 2019 01:14) (16 - 18)  SpO2: 96% (19 Dec 2019 01:14) (93% - 99%)    --------------------------------------------------------------------------------------------------  I/Os:    17 Dec 2019 07:01  -  18 Dec 2019 07:00  --------------------------------------------------------  IN:    dextrose 5% + sodium chloride 0.45% with potassium chloride 20 mEq/L: 900 mL    Oral Fluid: 300 mL  Total IN: 1200 mL    OUT:    Colostomy: 170 mL    Voided: 250 mL  Total OUT: 420 mL    Total NET: 780 mL      18 Dec 2019 07:01  -  19 Dec 2019 04:45  --------------------------------------------------------  IN:    dextrose 5% + sodium chloride 0.45% with potassium chloride 20 mEq/L: 675 mL    Oral Fluid: 400 mL  Total IN: 1075 mL    OUT:    Colostomy: 140 mL    Voided: 750 mL  Total OUT: 890 mL    Total NET: 185 mL        --------------------------------------------------------------------------------------------------  LABS:                        9.2    11.79 )-----------( 272      ( 18 Dec 2019 19:31 )             28.8     18 Dec 2019 19:31    132    |  99     |  5      ----------------------------<  160    4.3     |  21     |  0.71     Ca    9.1        18 Dec 2019 19:31  Phos  3.5       18 Dec 2019 19:31  Mg     2.3       18 Dec 2019 19:31        CAPILLARY BLOOD GLUCOSE      POCT Blood Glucose.: 138 mg/dL (18 Dec 2019 20:57)  POCT Blood Glucose.: 146 mg/dL (18 Dec 2019 18:34)  POCT Blood Glucose.: 112 mg/dL (18 Dec 2019 13:13)              --------------------------------------------------------------------------------------------------  MEDICATIONS  (STANDING):  ALBUTerol    90 MICROgram(s) HFA Inhaler 1 Puff(s) Inhalation every 4 hours  ascorbic acid 500 milliGRAM(s) Oral daily  atorvastatin 20 milliGRAM(s) Oral at bedtime  budesonide 160 MICROgram(s)/formoterol 4.5 MICROgram(s) Inhaler 2 Puff(s) Inhalation two times a day  chlorhexidine 4% Liquid 1 Application(s) Topical <User Schedule>  dextrose 5% + sodium chloride 0.45% with potassium chloride 20 mEq/L 1000 milliLiter(s) (75 mL/Hr) IV Continuous <Continuous>  dextrose 5%. 1000 milliLiter(s) (50 mL/Hr) IV Continuous <Continuous>  dextrose 50% Injectable 12.5 Gram(s) IV Push once  dextrose 50% Injectable 25 Gram(s) IV Push once  dextrose 50% Injectable 25 Gram(s) IV Push once  enalapril 10 milliGRAM(s) Oral two times a day  enoxaparin Injectable 40 milliGRAM(s) SubCutaneous daily  folic acid 1 milliGRAM(s) Oral daily  furosemide   Oral Tab/Cap - Peds 40 milliGRAM(s) Oral two times a day  insulin lispro (HumaLOG) corrective regimen sliding scale   SubCutaneous Before meals and at bedtime  magnesium oxide 400 milliGRAM(s) Oral three times a day with meals  magnesium sulfate  IVPB 2 Gram(s) IV Intermittent daily  montelukast 10 milliGRAM(s) Oral daily  pantoprazole    Tablet 40 milliGRAM(s) Oral before breakfast  spironolactone 25 milliGRAM(s) Oral daily    MEDICATIONS  (PRN):  acetaminophen   Tablet .. 650 milliGRAM(s) Oral every 6 hours PRN Mild Pain (1 - 3), Moderate Pain (4 - 6)  dextrose 40% Gel 15 Gram(s) Oral once PRN Blood Glucose LESS THAN 70 milliGRAM(s)/deciliter  glucagon  Injectable 1 milliGRAM(s) IntraMuscular once PRN Glucose LESS THAN 70 milligrams/deciliter  guaifenesin/dextromethorphan  Syrup 10 milliLiter(s) Oral every 6 hours PRN Cough  sodium chloride 0.9% lock flush 10 milliLiter(s) IV Push every 1 hour PRN Pre/post blood products, medications, blood draw, and to maintain line patency

## 2019-12-19 NOTE — DISCHARGE NOTE PROVIDER - CARE PROVIDER_API CALL
Clark Alvarado)  Surgery  [address], [address]  [place], [place] [id number]  Phone: [phone]  Fax: [phone]  Follow Up Time: 2 weeks

## 2019-12-19 NOTE — DIETITIAN INITIAL EVALUATION ADULT. - PERTINENT LABORATORY DATA
Finger sticks: (12/18) 112-146 (12/17) 101-137; (12/19) BUN: 4 <L> Glucose: 121 <H> (11/10/19) HbA1c: 5.1%

## 2019-12-19 NOTE — DISCHARGE NOTE PROVIDER - NSDCFUADDINST_GEN_ALL_CORE_FT
Please follow up with interventional radiology in 2 weeks for drain assessment. You may call [phone] to make an appointment.

## 2019-12-19 NOTE — DIETITIAN INITIAL EVALUATION ADULT. - ADD RECOMMEND
1. Continue monitoring weight, skin integrity, diet tolerance, and intake as applicable. 2. Encourage PO intake and obtain menu preferences as needed. 3. Reinforced diabetic nutrition therapy, and encouraged pt to continue with low sugar, low sodium diet. Also encouraged small frequent meals with nutritional supplement. Pt was amenable, RD remains available. 1. Continue monitoring weight, skin integrity, diet tolerance, and intake as applicable. 2. Encourage PO intake and obtain menu preferences as needed. 3. Reinforced diabetic nutrition therapy, and encouraged pt to continue with low sugar, low sodium diet. Also encouraged small frequent meals with protein and nutritional supplement. Pt was amenable, RD remains available.

## 2019-12-19 NOTE — PROGRESS NOTE ADULT - ASSESSMENT
Impression:  1) Leakage from Delgado stump - resolved s/p endoscopic intervention  s/p flex sig yesterday with identification of fistual s/p OTSC placement  2) Abdominal collection    Recommendations:  - no further plan for endoscopic intervention  - Follow tube output, and can do tube study in the future.  - rest of plan as per surgical team

## 2019-12-19 NOTE — DISCHARGE NOTE PROVIDER - CARE PROVIDERS DIRECT ADDRESSES
[email or web address]

## 2019-12-19 NOTE — PROGRESS NOTE ADULT - SUBJECTIVE AND OBJECTIVE BOX
Chief Complaint:  Patient is a 74y old  Female who presents with a chief complaint of displaced IR drain (19 Dec 2019 04:44)    Interval Events: Hipaque enema showing no contrast leak. Pt w/o any complaints this AM.    Hospital Medications:  acetaminophen   Tablet .. 650 milliGRAM(s) Oral every 6 hours PRN  ALBUTerol    90 MICROgram(s) HFA Inhaler 1 Puff(s) Inhalation every 4 hours  ascorbic acid 500 milliGRAM(s) Oral daily  atorvastatin 20 milliGRAM(s) Oral at bedtime  budesonide 160 MICROgram(s)/formoterol 4.5 MICROgram(s) Inhaler 2 Puff(s) Inhalation two times a day  chlorhexidine 4% Liquid 1 Application(s) Topical <User Schedule>  dextrose 40% Gel 15 Gram(s) Oral once PRN  dextrose 5% + sodium chloride 0.45% with potassium chloride 20 mEq/L 1000 milliLiter(s) IV Continuous <Continuous>  dextrose 5%. 1000 milliLiter(s) IV Continuous <Continuous>  dextrose 50% Injectable 12.5 Gram(s) IV Push once  dextrose 50% Injectable 25 Gram(s) IV Push once  dextrose 50% Injectable 25 Gram(s) IV Push once  enalapril 10 milliGRAM(s) Oral two times a day  enoxaparin Injectable 40 milliGRAM(s) SubCutaneous daily  folic acid 1 milliGRAM(s) Oral daily  furosemide   Oral Tab/Cap - Peds 40 milliGRAM(s) Oral two times a day  glucagon  Injectable 1 milliGRAM(s) IntraMuscular once PRN  guaifenesin/dextromethorphan  Syrup 10 milliLiter(s) Oral every 6 hours PRN  insulin lispro (HumaLOG) corrective regimen sliding scale   SubCutaneous Before meals and at bedtime  magnesium oxide 400 milliGRAM(s) Oral three times a day with meals  magnesium sulfate  IVPB 2 Gram(s) IV Intermittent daily  montelukast 10 milliGRAM(s) Oral daily  pantoprazole    Tablet 40 milliGRAM(s) Oral before breakfast  sodium chloride 0.9% lock flush 10 milliLiter(s) IV Push every 1 hour PRN  spironolactone 25 milliGRAM(s) Oral daily      ROS:   General:  No wt loss, fevers, chills, night sweats  Eyes:  Good vision, no reported pain  ENT:  No sore throat, pain, runny nose, dysphagia  CV:  No pain, palpitations, hypo/hypertension  Pulm:  No dyspnea, cough, tachypnea, wheezing  GI:  See HPI, otherwise negative  :  No pain, bleeding, incontinence, nocturia  Muscle:  No pain, weakness  Neuro:  No weakness, tingling, memory problems  Psych:  No fatigue, insomnia, mood problems, depression  Endocrine:  No polyuria, polydipsia, cold/heat intolerance  Heme:  No petechiae, ecchymosis, easy bruisability  Skin:  No rash, tattoos, scars, edema    PHYSICAL EXAM:   Vital Signs:  Vital Signs Last 24 Hrs  T(C): 37.1 (19 Dec 2019 05:39), Max: 37.1 (18 Dec 2019 13:54)  T(F): 98.7 (19 Dec 2019 05:39), Max: 98.7 (18 Dec 2019 13:54)  HR: 94 (19 Dec 2019 05:39) (83 - 97)  BP: 112/66 (19 Dec 2019 05:39) (100/57 - 121/71)  BP(mean): --  RR: 17 (19 Dec 2019 05:39) (16 - 18)  SpO2: 94% (19 Dec 2019 05:39) (93% - 99%)  Daily     Daily Weight in k.6 (18 Dec 2019 08:16)    GENERAL: no acute distress  NEURO: alert and oriented x 3, no asterixis  HEENT: anicteric sclera, no conjunctival pallor appreciated  CHEST: no respiratory distress, no accessory muscle use  CARDIAC: regular rate, rhythm  ABDOMEN: soft, non-tender, non-distended, no rebound or guarding, ostomy w/ gas and stool in bag, no output from L drain  EXTREMITIES: warm, well perfused, no edema  SKIN: no lesions noted    LABS: reviewed                        9.2    11.79 )-----------( 272      ( 18 Dec 2019 19:31 )             28.8     12-18    132<L>  |  99  |  5<L>  ----------------------------<  160<H>  4.3   |  21<L>  |  0.71    Ca    9.1      18 Dec 2019 19:31  Phos  3.5     12-18  Mg     2.3     12-18          Interval Diagnostic Studies: see sunrise for full report

## 2019-12-19 NOTE — DIETITIAN INITIAL EVALUATION ADULT. - REASON INDICATOR FOR ASSESSMENT
Pt seen for length of stay initial assessment 9MON.  Information obtained from: medical record, pt.    Pertinent information as per chart: Pt is a 75 y/o F with PMH of type 2 DM, HTN, COPD, CHF, abdominal abscess S/P IR drain x2, colostomy. Presented with LLQ pain. Found with displaced IR drain, leakage from Delgado stump. S/P drain revision (12/16), colonoscopy and OTSC placement (12/17).

## 2019-12-19 NOTE — DIETITIAN INITIAL EVALUATION ADULT. - OTHER INFO
Pt reports "excellent" appetite and PO intake at home. States she followed a diabetic diet, and watched her sugar, salt, and fluid intake PTA. Confirms NKFA. Reports taking vitamin C at home. Reports weight to be stable, UBW 3 months ago 144 pounds. Weight as per current documentation (12/18) 155.6 pounds, will continue to monitor. Pt reports checking her blood glucose daily; states numbers are typically 112-115. Reports taking corrective insulin when needed. HbA1c (11/10/19): 5.1%, indicating good glycemic control.    Pt reports fair appetite in house, usually consuming >50% meals. Offered pt nutritional supplement, pt amenable for Glucerna x1 daily. Denies difficulties chewing/swallowing. Denies nausea/vomiting presently. Colostomy output as per flow sheets: (12/19) 30 mL thus far (12/18) 280 mL (12/17) 150 mL.    Briefly reviewed diabetic nutrition therapy, pt was able to recall combining carbohydrates with protein, avoiding concentrated sweets and desserts, and being mindful of portion size. Encouraged small frequent meals and sips of nutritional supplement throughout the day to help increase/optimize PO intake in setting of increased needs and fair appetite. Discussed recommendations for daily weights as pt is also with CHF. Pt was amenable, RD remains available. Pt reports "excellent" appetite and PO intake at home. States she followed a diabetic diet, and watched her sugar, salt, and fluid intake PTA. Confirms NKFA. Reports taking vitamin C at home. Reports weight to be stable, UBW 3 months ago 144 pounds. Weight as per current documentation (12/18) 155.6 pounds, will continue to monitor. Pt reports checking her blood glucose daily; states numbers are typically 112-115. Reports taking corrective insulin when needed. HbA1c (11/10/19): 5.1%, indicating good glycemic control.    Pt reports fair appetite in house, usually consuming >50% meals. Offered pt nutritional supplement, pt amenable for Glucerna x1 daily. Denies difficulties chewing/swallowing. Denies nausea/vomiting presently. Colostomy output as per flow sheets: (12/19) 30 mL thus far (12/18) 280 mL (12/17) 150 mL.    Briefly reviewed diabetic nutrition therapy, pt was able to recall combining carbohydrates with protein, avoiding concentrated sweets and desserts, and being mindful of portion size. Encouraged small frequent meals with protein foods, and sips of nutritional supplement throughout the day to help increase/optimize PO intake in setting of increased needs and fair appetite. Discussed recommendations for daily weights as pt is also with CHF. Pt was amenable, RD remains available.

## 2019-12-19 NOTE — PROGRESS NOTE ADULT - ASSESSMENT
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 Ongoing sigmoid stump leak on CT with rectal contrast, medial drain removed, left drain (attached to gravity bag) dislodged with minor leakage around site. Now s/p drain revision/replacement by IR 12/16. Now s/p sigmoidoscopy with OTSC deployment at site of dehiscence     Plan:  - Diet: Regular  - Contact CM for return to rehab  - Follow IR drains, percutaneous drain seen at fistula site prior to clip placement  - Pain control as needed  - c/w abx: cipro, flagyl.   - DVT ppx  - OOB and ambulating as tolerated  - F/u AM labs.    [facility]  [id number]

## 2019-12-19 NOTE — DIETITIAN INITIAL EVALUATION ADULT. - PHYSICAL APPEARANCE
Pt very drowsy during interview; nutrition focused physical exam deferred at this time. Pt was difficult to assess visually due to being covered in many blankets up to her nose./other (specify) Ht: 62 inches Wt: 155.6 pounds BMI: 26.3 kg/m2 IBW: 110 pounds (+/-10%) %IBW: 141%  Edema: no edema noted as per documentation, previously 1+ bilateral foot/ankle edema noted.  Skin: no pressure injuries noted as per documentation, MAD, surgical incisions at right thigh and left abdomen noted as per documentation.

## 2019-12-19 NOTE — DIETITIAN INITIAL EVALUATION ADULT. - DIET TYPE
Consistent carbohydrate with evening snack 1. Recommend continue with consistent carbohydrate diet as medically feasible, will continue to monitor and adjust as needed. 2. Recommend initiate low sodium diet in setting of pt with CHF and following a low sodium diet at home. 3. Recommend Glucerna x1 daily (220 calories, 10 gm protein) to help increase/optimize PO intake in setting of increased needs, spoke with provider.

## 2019-12-19 NOTE — PROGRESS NOTE ADULT - REASON FOR ADMISSION
displaced IR drain

## 2019-12-19 NOTE — DISCHARGE NOTE NURSING/CASE MANAGEMENT/SOCIAL WORK - PATIENT PORTAL LINK FT
You can access the FollowMyHealth Patient Portal offered by [facility] by registering at the following website: [email or web address]. By joining [employer]’s FollowMyHealth portal, you will also be able to view your health information using other applications (apps) compatible with our system.

## 2019-12-19 NOTE — PROGRESS NOTE ADULT - ATTENDING COMMENTS
I saw and examined the pt and discussed the tx plan with the House Staff. I agree with the exam and plan as documented in the surgery resident's note from today.  Will ask IR to do tube check and possibly remove the drain, as it has no output and when flushed the fluid comes back out.   Tracy Jay MD I saw and examined the pt and discussed the tx plan with the House Staff. I agree with the exam and plan as documented in the surgery resident's note from today.  Will ask IR to do tube check and possibly remove vs exchange the drain, as it has no output and when flushed the fluid comes back out.   Tracy Jay MD

## 2019-12-19 NOTE — DISCHARGE NOTE PROVIDER - HOSPITAL COURSE
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 In the ED, CTAP was performed with rectal contrast which re-demonstrated the Lupe stump leak with contrast extravasating near the abdominal drains and into the fistula site.  On 12/13 Interventional radiology was consulted for drain placement, which was successfully placed by IR. Physical therapy evaluated patient and recommend return to rehab facility. 12/17 patient had a hypaque enema performed by GI service which revealed no rectal stump leakage. Given patient's improved clinical status and no evidence of ongoing rectal stump leak, patient may be discharged back to rehab facility. Patient should follow up with interventional radiology in 2 weeks for drain assessment/removal.        At the time of discharge, the patient was hemodynamically stable, tolerating PO diet, voiding urine, passing stool, ambulating and was comfortable with adequate pain control. The patient was instructed to follow up with Dr. Alvarado within 2 weeks after discharge. The patient/family felt comfortable with discharge. The patient was discharged to home/rehab. The patient had no other issues.

## 2019-12-19 NOTE — DISCHARGE NOTE PROVIDER - NSDCMRMEDTOKEN_GEN_ALL_CORE_FT
apixaban 5 mg oral tablet: 1 tab(s) orally every 12 hours  ascorbic acid 500 mg oral tablet: 1 tab(s) orally once a day  Aspir 81 oral delayed release tablet: 1 tab(s) orally once a day  atorvastatin 20 mg oral tablet: 1 tab(s) orally once a day (at bedtime)  cetirizine 10 mg oral tablet: 1 tab(s) orally once a day  enalapril 10 mg oral tablet: 1 tab(s) orally 2 times a day  ferrous sulfate 325 mg (65 mg elemental iron) oral tablet: 1 tab(s) orally 2 times a day  folic acid 1 mg oral tablet: 1 tab(s) orally once a day  furosemide 40 mg oral tablet: 1  orally 2 times a day  guaiFENesin 100 mg/5 mL oral liquid: 5 milliliter(s) orally every 6 hours, As needed, Cough  magnesium oxide 500 mg oral tablet: 1 tab(s) orally 3 times a day max daily dose 3 tablets   montelukast 10 mg oral tablet: 1 tab(s) orally once a day  omeprazole 40 mg oral delayed release capsule: 1 cap(s) orally once a day  potassium chloride 20 mEq/15 mL oral liquid: 15 milliliter(s) orally once a day only as needed   repaglinide 0.5 mg oral tablet: 1 tab(s) orally 3 times a day (before meals)  spironolactone 25 mg oral tablet: 1 tab(s) orally once a day  Symbicort 160 mcg-4.5 mcg/inh inhalation aerosol: 2 puff(s) inhaled once a day (at bedtime)  Ventolin HFA 90 mcg/inh inhalation aerosol: 1 puff(s) inhaled every 4 hours-6 hours as needed

## 2019-12-31 ENCOUNTER — EMERGENCY (EMERGENCY)
Facility: HOSPITAL | Age: 74
LOS: 1 days | Discharge: HOME | End: 2019-12-31
Attending: EMERGENCY MEDICINE
Payer: MEDICARE

## 2019-12-31 VITALS
HEART RATE: 98 BPM | WEIGHT: 139.99 LBS | TEMPERATURE: 98 F | RESPIRATION RATE: 18 BRPM | SYSTOLIC BLOOD PRESSURE: 95 MMHG | HEIGHT: 64 IN | OXYGEN SATURATION: 99 % | DIASTOLIC BLOOD PRESSURE: 62 MMHG

## 2019-12-31 VITALS
HEART RATE: 95 BPM | RESPIRATION RATE: 18 BRPM | SYSTOLIC BLOOD PRESSURE: 107 MMHG | OXYGEN SATURATION: 97 % | DIASTOLIC BLOOD PRESSURE: 68 MMHG

## 2019-12-31 DIAGNOSIS — Z90.49 ACQUIRED ABSENCE OF OTHER SPECIFIED PARTS OF DIGESTIVE TRACT: Chronic | ICD-10-CM

## 2019-12-31 LAB
ALBUMIN SERPL ELPH-MCNC: 3.7 G/DL — SIGNIFICANT CHANGE UP (ref 3.3–5)
ALP SERPL-CCNC: 78 U/L — SIGNIFICANT CHANGE UP (ref 40–120)
ALT FLD-CCNC: 9 U/L — LOW (ref 10–45)
ANION GAP SERPL CALC-SCNC: 13 MMOL/L — SIGNIFICANT CHANGE UP (ref 5–17)
APTT BLD: 32.7 SEC — SIGNIFICANT CHANGE UP (ref 27.5–36.3)
AST SERPL-CCNC: 16 U/L — SIGNIFICANT CHANGE UP (ref 10–40)
BASOPHILS # BLD AUTO: 0.07 K/UL — SIGNIFICANT CHANGE UP (ref 0–0.2)
BASOPHILS NFR BLD AUTO: 0.6 % — SIGNIFICANT CHANGE UP (ref 0–2)
BILIRUB SERPL-MCNC: 0.2 MG/DL — SIGNIFICANT CHANGE UP (ref 0.2–1.2)
BLD GP AB SCN SERPL QL: NEGATIVE — SIGNIFICANT CHANGE UP
BUN SERPL-MCNC: 22 MG/DL — SIGNIFICANT CHANGE UP (ref 7–23)
CALCIUM SERPL-MCNC: 9.9 MG/DL — SIGNIFICANT CHANGE UP (ref 8.4–10.5)
CHLORIDE SERPL-SCNC: 97 MMOL/L — SIGNIFICANT CHANGE UP (ref 96–108)
CO2 SERPL-SCNC: 29 MMOL/L — SIGNIFICANT CHANGE UP (ref 22–31)
CREAT SERPL-MCNC: 0.69 MG/DL — SIGNIFICANT CHANGE UP (ref 0.5–1.3)
EOSINOPHIL # BLD AUTO: 0.3 K/UL — SIGNIFICANT CHANGE UP (ref 0–0.5)
EOSINOPHIL NFR BLD AUTO: 2.5 % — SIGNIFICANT CHANGE UP (ref 0–6)
GAS PNL BLDV: SIGNIFICANT CHANGE UP
GLUCOSE SERPL-MCNC: 132 MG/DL — HIGH (ref 70–99)
HCT VFR BLD CALC: 32.5 % — LOW (ref 34.5–45)
HGB BLD-MCNC: 10.5 G/DL — LOW (ref 11.5–15.5)
IMM GRANULOCYTES NFR BLD AUTO: 0.3 % — SIGNIFICANT CHANGE UP (ref 0–1.5)
INR BLD: 1.66 RATIO — HIGH (ref 0.88–1.16)
LACTATE BLDV-MCNC: 1 MMOL/L — SIGNIFICANT CHANGE UP (ref 0.7–2)
LYMPHOCYTES # BLD AUTO: 1.81 K/UL — SIGNIFICANT CHANGE UP (ref 1–3.3)
LYMPHOCYTES # BLD AUTO: 15.2 % — SIGNIFICANT CHANGE UP (ref 13–44)
MCHC RBC-ENTMCNC: 32.3 GM/DL — SIGNIFICANT CHANGE UP (ref 32–36)
MCHC RBC-ENTMCNC: 33.8 PG — SIGNIFICANT CHANGE UP (ref 27–34)
MCV RBC AUTO: 104.5 FL — HIGH (ref 80–100)
MONOCYTES # BLD AUTO: 1.01 K/UL — HIGH (ref 0–0.9)
MONOCYTES NFR BLD AUTO: 8.5 % — SIGNIFICANT CHANGE UP (ref 2–14)
NEUTROPHILS # BLD AUTO: 8.69 K/UL — HIGH (ref 1.8–7.4)
NEUTROPHILS NFR BLD AUTO: 72.9 % — SIGNIFICANT CHANGE UP (ref 43–77)
NRBC # BLD: 0 /100 WBCS — SIGNIFICANT CHANGE UP (ref 0–0)
PLATELET # BLD AUTO: 299 K/UL — SIGNIFICANT CHANGE UP (ref 150–400)
POTASSIUM SERPL-MCNC: 4.3 MMOL/L — SIGNIFICANT CHANGE UP (ref 3.5–5.3)
POTASSIUM SERPL-SCNC: 4.3 MMOL/L — SIGNIFICANT CHANGE UP (ref 3.5–5.3)
PROT SERPL-MCNC: 7.8 G/DL — SIGNIFICANT CHANGE UP (ref 6–8.3)
PROTHROM AB SERPL-ACNC: 19.2 SEC — HIGH (ref 10–12.9)
RBC # BLD: 3.11 M/UL — LOW (ref 3.8–5.2)
RBC # FLD: 13.3 % — SIGNIFICANT CHANGE UP (ref 10.3–14.5)
RH IG SCN BLD-IMP: POSITIVE — SIGNIFICANT CHANGE UP
SODIUM SERPL-SCNC: 139 MMOL/L — SIGNIFICANT CHANGE UP (ref 135–145)
WBC # BLD: 11.92 K/UL — HIGH (ref 3.8–10.5)
WBC # FLD AUTO: 11.92 K/UL — HIGH (ref 3.8–10.5)

## 2019-12-31 PROCEDURE — 82435 ASSAY OF BLOOD CHLORIDE: CPT

## 2019-12-31 PROCEDURE — 84132 ASSAY OF SERUM POTASSIUM: CPT

## 2019-12-31 PROCEDURE — 85027 COMPLETE CBC AUTOMATED: CPT

## 2019-12-31 PROCEDURE — 99284 EMERGENCY DEPT VISIT MOD MDM: CPT

## 2019-12-31 PROCEDURE — 99284 EMERGENCY DEPT VISIT MOD MDM: CPT | Mod: 25

## 2019-12-31 PROCEDURE — 82947 ASSAY GLUCOSE BLOOD QUANT: CPT

## 2019-12-31 PROCEDURE — 82803 BLOOD GASES ANY COMBINATION: CPT

## 2019-12-31 PROCEDURE — 84295 ASSAY OF SERUM SODIUM: CPT

## 2019-12-31 PROCEDURE — 80053 COMPREHEN METABOLIC PANEL: CPT

## 2019-12-31 PROCEDURE — 86900 BLOOD TYPING SEROLOGIC ABO: CPT

## 2019-12-31 PROCEDURE — 83605 ASSAY OF LACTIC ACID: CPT

## 2019-12-31 PROCEDURE — 82330 ASSAY OF CALCIUM: CPT

## 2019-12-31 PROCEDURE — 93010 ELECTROCARDIOGRAM REPORT: CPT

## 2019-12-31 PROCEDURE — 93005 ELECTROCARDIOGRAM TRACING: CPT

## 2019-12-31 PROCEDURE — 86901 BLOOD TYPING SEROLOGIC RH(D): CPT

## 2019-12-31 PROCEDURE — 85610 PROTHROMBIN TIME: CPT

## 2019-12-31 PROCEDURE — 82962 GLUCOSE BLOOD TEST: CPT

## 2019-12-31 PROCEDURE — 74177 CT ABD & PELVIS W/CONTRAST: CPT

## 2019-12-31 PROCEDURE — 86850 RBC ANTIBODY SCREEN: CPT

## 2019-12-31 PROCEDURE — 85730 THROMBOPLASTIN TIME PARTIAL: CPT

## 2019-12-31 PROCEDURE — 74177 CT ABD & PELVIS W/CONTRAST: CPT | Mod: 26

## 2019-12-31 PROCEDURE — 85014 HEMATOCRIT: CPT

## 2019-12-31 RX ORDER — SODIUM CHLORIDE 9 MG/ML
1000 INJECTION INTRAMUSCULAR; INTRAVENOUS; SUBCUTANEOUS ONCE
Refills: 0 | Status: COMPLETED | OUTPATIENT
Start: 2019-12-31 | End: 2019-12-31

## 2019-12-31 RX ADMIN — SODIUM CHLORIDE 1000 MILLILITER(S): 9 INJECTION INTRAMUSCULAR; INTRAVENOUS; SUBCUTANEOUS at 13:50

## 2019-12-31 NOTE — ED PROVIDER NOTE - PROGRESS NOTE DETAILS
Patient's repeat lactate improved, patient cleared by surgery, will dc w return precautions. RGUJRAL

## 2019-12-31 NOTE — ED ADULT NURSE NOTE - NSIMPLEMENTINTERV_GEN_ALL_ED
Implemented All Fall with Harm Risk Interventions:  [place] to call system. Call bell, personal items and telephone within reach. Instruct patient to call for assistance. Room bathroom lighting operational. Non-slip footwear when patient is off stretcher. Physically safe environment: no spills, clutter or unnecessary equipment. Stretcher in lowest position, wheels locked, appropriate side rails in place. Provide visual cue, wrist band, yellow gown, etc. Monitor gait and stability. Monitor for mental status changes and reorient to person, place, and time. Review medications for side effects contributing to fall risk. Reinforce activity limits and safety measures with patient and family. Provide visual clues: red socks.

## 2019-12-31 NOTE — ED ADULT NURSE NOTE - OBJECTIVE STATEMENT
Female 74 years old with past medical history of DM, HTN, COPD/Asthma, Afib on Eliquis was brought in by EMS from [facility] for left quadrant drain fell off Sunday night. Pt denies abdominal pain. Pt is s/p Delgado procedure July for diverticulitis and s/p drain of abscess x2 with fistula pouch. Pt has right ostomy bag. Right lower abdominal fistula site pinkish in color with small opening draining foul smell pus like drainage. No fever, chills, N/V. Will continue to reassess.

## 2019-12-31 NOTE — CONSULT NOTE ADULT - SUBJECTIVE AND OBJECTIVE BOX
[facility] Surgical Consultant Evaluation    HPI:  Pretty is a very pleasant 74 Year-Old Lady very well known to the Surgical Service with PMHx of DM2, HTN, COPD, aflutter on eliquis and perforated sigmoid diverticulitis s/p Delgado’s in July of this year complicated by sigmoid stump leak,  abdominal abscess s/p IR drainage x 2 most recently in the LLQ on 11/19, EC fistula that is now pouched,   who presents from [facility] with abdominal drain dislodgement for 2 days. Was inadvertently removed. No other symptoms, has been tolerating diet.     PAST MEDICAL & SURGICAL HISTORY:  Refusal of blood transfusions as patient is Sikhism  Patient is Sikhism  Vertigo  Atrial flutter  Diverticulitis  Cardiomyopathy  Kidney stone  Cardiac Pacemaker  HTN - Hypertension  Gout  Diabetes  Congestive Heart Failure  Asthma  Status post left hemicolectomy  S/P cholecystectomy  AICD (Automatic Cardioverter/Defibrillator) Present: inserted in Aug, 2008. Due for battery change in 1 month. ( [employer]) . Inserted by Dr Duffy      MEDICATIONS  (STANDING):      ___________________________________________  REVIEW OF SYSTEMS:  As stated in History of Present Illness, otherwise non-contributory.   ___________________________________________  PHYSICAL EXAM:  Vital Signs Last 24 Hrs  T(C): 37.7 (31 Dec 2019 12:07), Max: 37.7 (31 Dec 2019 12:07)  T(F): 99.9 (31 Dec 2019 12:07), Max: 99.9 (31 Dec 2019 12:07)  HR: 98 (31 Dec 2019 12:07) (98 - 98)  BP: 120/66 (31 Dec 2019 12:07) (95/62 - 120/66)  BP(mean): --  RR: 20 (31 Dec 2019 12:07) (18 - 20)  SpO2: 96% (31 Dec 2019 12:07) (96% - 99%)CAPILLARY BLOOD GLUCOSE        I&O's Detail      General: Alert and Oriented x3, No acute distress.  Respiratory: Breathing non-labored.  Abdomen: Soft, nontender, nondistended. R ECF ostomy bag with minimal output. Midline skin graft well healed, minor superficial lower abdominal area of granulation tissue. LLQ prior drain site well healed with suture.  Extremities: Moves all four.   ____________________________________________  LABS:  CBC Full  -  ( 31 Dec 2019 13:10 )  WBC Count : 11.92 K/uL  RBC Count : 3.11 M/uL  Hemoglobin : 10.5 g/dL  Hematocrit : 32.5 %  Platelet Count - Automated : 299 K/uL  Mean Cell Volume : 104.5 fl  Mean Cell Hemoglobin : 33.8 pg  Mean Cell Hemoglobin Concentration : 32.3 gm/dL  Auto Neutrophil # : 8.69 K/uL  Auto Lymphocyte # : 1.81 K/uL  Auto Monocyte # : 1.01 K/uL  Auto Eosinophil # : 0.30 K/uL  Auto Basophil # : 0.07 K/uL  Auto Neutrophil % : 72.9 %  Auto Lymphocyte % : 15.2 %  Auto Monocyte % : 8.5 %  Auto Eosinophil % : 2.5 %  Auto Basophil % : 0.6 %    12-31    139  |  97  |  22  ----------------------------<  132<H>  4.3   |  29  |  0.69    Ca    9.9      31 Dec 2019 13:10    TPro  7.8  /  Alb  3.7  /  TBili  0.2  /  DBili  x   /  AST  16  /  ALT  9<L>  /  AlkPhos  78  12-31    LIVER FUNCTIONS - ( 31 Dec 2019 13:10 )  Alb: 3.7 g/dL / Pro: 7.8 g/dL / ALK PHOS: 78 U/L / ALT: 9 U/L / AST: 16 U/L / GGT: x           PT/INR - ( 31 Dec 2019 13:10 )   PT: 19.2 sec;   INR: 1.66 ratio         PTT - ( 31 Dec 2019 13:10 )  PTT:32.7 sec        ____________________________________________  RADIOLOGY:  < from: CT Abdomen and Pelvis w/ IV Cont (12.31.19 @ 14:39) >  FINDINGS:    LOWER CHEST: Partially imaged AICD leads. Chronic right middle lobe scarring.    LIVER: Within normal limits.  BILE DUCTS: Normal caliber.  GALLBLADDER: Cholecystectomy.  SPLEEN: Within normal limits.  PANCREAS: Within normal limits.  ADRENALS: Within normal limits.  KIDNEYS/URETERS: Unchanged multiple left intrarenal calculi measuring up to 6 mm.    BLADDER: Within normal limits.  REPRODUCTIVE ORGANS: Calcified fibroids.    BOWEL: Status post Delgado's procedure with left hemicolectomy and right sided colostomy.  A small fistulous tract containing contrast from prior Hypaque enema from the Lupe pouch to the anterior abdominal wall is again noted, unchanged. Colonic diverticulosis. No bowel obstruction.   PERITONEUM: No ascites.  VESSELS: Atherosclerotic changes.  RETROPERITONEUM/LYMPH NODES: No lymphadenopathy.    ABDOMINAL WALL: Interval removal of the left hemiabdomen pigtail catheter. No residual drainable fluid collection. Large ventral hernia containing nonobstructed bowel.  BONES:Degenerative changes.    IMPRESSION:     No residual drainable fluid collection.    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      < end of copied text >

## 2019-12-31 NOTE — ED PROVIDER NOTE - PATIENT PORTAL LINK FT
You can access the FollowMyHealth Patient Portal offered by [facility] by registering at the following website: [email or web address]. By joining [facility]’s FollowMyHealth portal, you will also be able to view your health information using other applications (apps) compatible with our system.

## 2019-12-31 NOTE — CONSULT NOTE ADULT - ASSESSMENT
Pretty is a very pleasant 74 Year-Old Lady  with a PMHx of DM2, HTN, COPD, aflutter on eliquis and perforated sigmoid diverticulitis s/p Delgado’s in July of this year complicated by sigmoid stump leak,  abdominal abscess s/p IR drainage x 2, EC fistula that is now pouched,  presents from rehab after removal of prior LLQ drain placed 11/19, CT with no residual collection.     - No Acute Surgical intervention indicated at this time.   - Patient okay for dispo per ED back to [facility].   - Discussed with Attending Surgeon.

## 2019-12-31 NOTE — ED PROVIDER NOTE - ATTENDING CONTRIBUTION TO CARE
Attending MD De Luna: I personally have seen and examined this patient.  Resident note reviewed and agree on plan of care and except where noted.  See below for details.     Seen in [facility], accompanied by daughter    74F with PMH/PSH including DM, HTN, COPD/asthma (no home O2), AFib on Eliquis, CHF s/p AICD, gout, sigmoid diverticulitis s/p Delgado's (7/2019) complicated by sigmoid stump leak, abdominal abscess s/p IR drainage x 2 (LLQ 11/2019), EC fistula presents to the ED with drain dislodgement for two days.  Denies fevers, chills, new abdominal pain.  Reports has baseline abdominal pain, unchanged.  Denies shortness of breath, chest pain.  Denies urinary complaints.  On exam, NAD, head NCAT, PERRL, FROM at neck, no tenderness to midline palpation, no stepoffs along length of spine, lungs CTAB with good inspiratory effort, +S1S2, no m/r/g, abdomen soft with +BS, NT, +R side abd with ostomy, pink and moist, brown liquid in bag, lower abdomen with fistula, L drain site with no active drainage, no erythema, no fluctuance, no CVAT, moving all extremities; A/P: 74F with IR placed L abdominal drain removed, will obtain labs, CTAP and consult surgery, will await surgical recommendations

## 2019-12-31 NOTE — ED ADULT NURSE NOTE - PMH
Asthma    Atrial flutter    Cardiac Pacemaker    Cardiomyopathy    Congestive Heart Failure    Diabetes    Diverticulitis    Gout    HTN - Hypertension    Kidney stone    Patient is Baptism    Refusal of blood transfusions as patient is Baptism    Vertigo

## 2019-12-31 NOTE — ED PROVIDER NOTE - CLINICAL SUMMARY MEDICAL DECISION MAKING FREE TEXT BOX
Radha Holt MD: 73yo F with PMH of DM, HTN, COPD/asthma not on home O2, Afib on Eliquis, CHF, gout, AICD who presents with abdominal drain dislodgement for 2 days. Pt hemodynamically stable, afebrile. Abdomen soft, nontender. L drain site does not appear infected. Will obtain preop labs, CT A/P, surgery consult

## 2019-12-31 NOTE — ED PROVIDER NOTE - PSH
AICD (Automatic Cardioverter/Defibrillator) Present  inserted in Aug, 2008. Due for battery change in 1 month. ( [employer]) . Inserted by Dr Duffy  S/P cholecystectomy    Status post left hemicolectomy

## 2019-12-31 NOTE — ED ADULT NURSE NOTE - CHPI ED NUR SYMPTOMS NEG
no chills/no tingling/no dizziness/no fever/no vomiting/no nausea/no pain/no weakness/no decreased eating/drinking

## 2019-12-31 NOTE — ED PROVIDER NOTE - PHYSICAL EXAMINATION
CONSTITUTIONAL: Nontoxic, well nourished, well developed, elderly female, resting comfortably in no acute distress  HEAD: Normocephalic; atraumatic  EYES: Normal inspection, EOMI  ENMT: External appears normal; normal oropharynx  NECK: Supple; non-tender; no cervical lymphadenopathy  CARD: RRR; no audible murmurs, rubs, or gallops  RESP: No respiratory distress, lungs ctab/l  ABD: Soft, non-distended; non-tender; no rebound or guarding, R anterior abd ostomy bag with brown/green liquid, lower abd fistula, L drain site with no active drainage and no erythema or fluctuance  EXT: No LE pitting edema or calf tenderness; distal pulses intact with good capillary refill  SKIN: Warm, dry, intact  NEURO: aaox3, moving all extremities spontaneously

## 2019-12-31 NOTE — ED PROVIDER NOTE - PMH
Asthma    Atrial flutter    Cardiac Pacemaker    Cardiomyopathy    Congestive Heart Failure    Diabetes    Diverticulitis    Gout    HTN - Hypertension    Kidney stone    Patient is Mormonism    Refusal of blood transfusions as patient is Mormonism    Vertigo

## 2019-12-31 NOTE — ED PROVIDER NOTE - NSFOLLOWUPINSTRUCTIONS_ED_ALL_ED_FT
-Follow up with your primary care provider in 24-48 hours.   -A copy of resulted labs, imaging, and findings have been provided to you.   -As we discussed, please return to the Emergency Department if you experience any new/worsening symptoms, including, but are not limited to: unrelenting nausea, vomiting, fever, chills, worsening abdominal pain, or any other concerning symptoms.  -If you have issues obtaining follow up, please call: [phone] to obtain a doctor or specialist who takes your insurance in your area.  You may call [phone] to make an appointment with the internal medicine clinic.

## 2019-12-31 NOTE — ED PROVIDER NOTE - OBJECTIVE STATEMENT
Radha Holt MD: 73yo F with PMH of DM, HTN, COPD Radha Holt MD: 73yo F with PMH of DM, HTN, COPD/asthma not on home O2, Afib on Eliquis, CHF, gout, AICD who presents with abdominal drain dislodgement for 2 days. Daughter is at bedside. Pt has hx of recent sigmoid diverticulosis in 7/19 s/p Delgado procedure c/n sigmoid stump leak and abdominal abscess s/p IR drain x2 with EC fistula pouched. Drain site on L anterior abdomen. Pt has ostomy bag in place R abdomen. No fever, chills, drainage, abdominal pain, N/V/D.     Surgeon: Clark Alvarado

## 2020-01-01 NOTE — PROGRESS NOTE ADULT - PROBLEM SELECTOR PROBLEM 6
What Type Of Note Output Would You Prefer (Optional)?: Standard Output How Severe Is Your Skin Lesion?: mild Has Your Skin Lesion Been Treated?: not been treated Is This A New Presentation, Or A Follow-Up?: Growth Additional History: Patient has had these spots since birth, within the past 3-4 weeks mom & dad noticed a moveable lump forming underneath. Does not seem to bother the baby. Pediatrician was concerned and referred to derm. Constipation

## 2020-01-04 NOTE — ED ADULT NURSE NOTE - NS ED NOTE  TALK SOMEONE YN
CONSTITUTIONAL: No fever, weight loss, or fatigue  EYES: No visual disturbances  ENMT:  No sinus or throat pain  NECK: No pain or stiffness  RESPIRATORY: No cough, wheezing, chills or hemoptysis; No shortness of breath  CARDIOVASCULAR: No chest pain, palpitations, dizziness, or leg swelling  GASTROINTESTINAL: + abdominal or  pain. No nausea, vomiting, or hematemesis; No diarrhea or constipation. + melena   GENITOURINARY: No dysuria, frequency, +hematuria,   NEUROLOGICAL: No headaches, memory loss, loss of strength, numbness, or tremors  SKIN: No itching, burning, rashes, or lesions   MUSCULOSKELETAL: No muscle, back, or extremity pain No

## 2020-01-08 ENCOUNTER — INPATIENT (INPATIENT)
Facility: HOSPITAL | Age: 75
LOS: 7 days | Discharge: NURSING, REHAB OR LONG-TERM CARE | DRG: 602 | End: 2020-01-16
Payer: MEDICARE

## 2020-01-08 VITALS
RESPIRATION RATE: 16 BRPM | HEART RATE: 97 BPM | HEIGHT: 64 IN | DIASTOLIC BLOOD PRESSURE: 53 MMHG | OXYGEN SATURATION: 98 % | WEIGHT: 141.1 LBS | SYSTOLIC BLOOD PRESSURE: 100 MMHG | TEMPERATURE: 99 F

## 2020-01-08 DIAGNOSIS — L02.91 CUTANEOUS ABSCESS, UNSPECIFIED: ICD-10-CM

## 2020-01-08 DIAGNOSIS — Z90.49 ACQUIRED ABSENCE OF OTHER SPECIFIED PARTS OF DIGESTIVE TRACT: Chronic | ICD-10-CM

## 2020-01-08 LAB
ALBUMIN SERPL ELPH-MCNC: 3 G/DL — LOW (ref 3.3–5)
ALP SERPL-CCNC: 73 U/L — SIGNIFICANT CHANGE UP (ref 40–120)
ALT FLD-CCNC: 7 U/L — LOW (ref 10–45)
ANION GAP SERPL CALC-SCNC: 13 MMOL/L — SIGNIFICANT CHANGE UP (ref 5–17)
APPEARANCE UR: CLEAR — SIGNIFICANT CHANGE UP
AST SERPL-CCNC: 14 U/L — SIGNIFICANT CHANGE UP (ref 10–40)
BACTERIA # UR AUTO: NEGATIVE — SIGNIFICANT CHANGE UP
BASE EXCESS BLDV CALC-SCNC: 3.2 MMOL/L — HIGH (ref -2–2)
BASOPHILS # BLD AUTO: 0.05 K/UL — SIGNIFICANT CHANGE UP (ref 0–0.2)
BASOPHILS NFR BLD AUTO: 0.3 % — SIGNIFICANT CHANGE UP (ref 0–2)
BILIRUB SERPL-MCNC: 0.4 MG/DL — SIGNIFICANT CHANGE UP (ref 0.2–1.2)
BILIRUB UR-MCNC: NEGATIVE — SIGNIFICANT CHANGE UP
BLD GP AB SCN SERPL QL: NEGATIVE — SIGNIFICANT CHANGE UP
BUN SERPL-MCNC: 28 MG/DL — HIGH (ref 7–23)
CALCIUM SERPL-MCNC: 9.1 MG/DL — SIGNIFICANT CHANGE UP (ref 8.4–10.5)
CHLORIDE SERPL-SCNC: 98 MMOL/L — SIGNIFICANT CHANGE UP (ref 96–108)
CO2 BLDV-SCNC: 29 MMOL/L — SIGNIFICANT CHANGE UP (ref 22–30)
CO2 SERPL-SCNC: 24 MMOL/L — SIGNIFICANT CHANGE UP (ref 22–31)
COLOR SPEC: SIGNIFICANT CHANGE UP
CREAT SERPL-MCNC: 0.78 MG/DL — SIGNIFICANT CHANGE UP (ref 0.5–1.3)
DIFF PNL FLD: NEGATIVE — SIGNIFICANT CHANGE UP
EOSINOPHIL # BLD AUTO: 0.1 K/UL — SIGNIFICANT CHANGE UP (ref 0–0.5)
EOSINOPHIL NFR BLD AUTO: 0.6 % — SIGNIFICANT CHANGE UP (ref 0–6)
EPI CELLS # UR: 1 /HPF — SIGNIFICANT CHANGE UP
GAS PNL BLDV: SIGNIFICANT CHANGE UP
GLUCOSE BLDC GLUCOMTR-MCNC: 97 MG/DL — SIGNIFICANT CHANGE UP (ref 70–99)
GLUCOSE SERPL-MCNC: 90 MG/DL — SIGNIFICANT CHANGE UP (ref 70–99)
GLUCOSE UR QL: NEGATIVE — SIGNIFICANT CHANGE UP
HCO3 BLDV-SCNC: 27 MMOL/L — SIGNIFICANT CHANGE UP (ref 21–29)
HCT VFR BLD CALC: 28.4 % — LOW (ref 34.5–45)
HGB BLD-MCNC: 9.1 G/DL — LOW (ref 11.5–15.5)
HOROWITZ INDEX BLDV+IHG-RTO: SIGNIFICANT CHANGE UP
HYALINE CASTS # UR AUTO: 1 /LPF — SIGNIFICANT CHANGE UP (ref 0–2)
IMM GRANULOCYTES NFR BLD AUTO: 0.4 % — SIGNIFICANT CHANGE UP (ref 0–1.5)
KETONES UR-MCNC: NEGATIVE — SIGNIFICANT CHANGE UP
LEUKOCYTE ESTERASE UR-ACNC: ABNORMAL
LIDOCAIN IGE QN: 9 U/L — SIGNIFICANT CHANGE UP (ref 7–60)
LYMPHOCYTES # BLD AUTO: 1.85 K/UL — SIGNIFICANT CHANGE UP (ref 1–3.3)
LYMPHOCYTES # BLD AUTO: 11.3 % — LOW (ref 13–44)
MCHC RBC-ENTMCNC: 32 GM/DL — SIGNIFICANT CHANGE UP (ref 32–36)
MCHC RBC-ENTMCNC: 33 PG — SIGNIFICANT CHANGE UP (ref 27–34)
MCV RBC AUTO: 102.9 FL — HIGH (ref 80–100)
MONOCYTES # BLD AUTO: 1.43 K/UL — HIGH (ref 0–0.9)
MONOCYTES NFR BLD AUTO: 8.8 % — SIGNIFICANT CHANGE UP (ref 2–14)
NEUTROPHILS # BLD AUTO: 12.83 K/UL — HIGH (ref 1.8–7.4)
NEUTROPHILS NFR BLD AUTO: 78.6 % — HIGH (ref 43–77)
NITRITE UR-MCNC: NEGATIVE — SIGNIFICANT CHANGE UP
NRBC # BLD: 0 /100 WBCS — SIGNIFICANT CHANGE UP (ref 0–0)
PCO2 BLDV: 42 MMHG — SIGNIFICANT CHANGE UP (ref 35–50)
PH BLDV: 7.43 — SIGNIFICANT CHANGE UP (ref 7.35–7.45)
PH UR: 6.5 — SIGNIFICANT CHANGE UP (ref 5–8)
PLATELET # BLD AUTO: 255 K/UL — SIGNIFICANT CHANGE UP (ref 150–400)
PO2 BLDV: 47 MMHG — HIGH (ref 25–45)
POTASSIUM SERPL-MCNC: 3.3 MMOL/L — LOW (ref 3.5–5.3)
POTASSIUM SERPL-SCNC: 3.3 MMOL/L — LOW (ref 3.5–5.3)
PROT SERPL-MCNC: 7 G/DL — SIGNIFICANT CHANGE UP (ref 6–8.3)
PROT UR-MCNC: ABNORMAL
RBC # BLD: 2.76 M/UL — LOW (ref 3.8–5.2)
RBC # FLD: 13.2 % — SIGNIFICANT CHANGE UP (ref 10.3–14.5)
RBC CASTS # UR COMP ASSIST: 1 /HPF — SIGNIFICANT CHANGE UP (ref 0–4)
RH IG SCN BLD-IMP: POSITIVE — SIGNIFICANT CHANGE UP
SAO2 % BLDV: 78 % — SIGNIFICANT CHANGE UP (ref 67–88)
SODIUM SERPL-SCNC: 135 MMOL/L — SIGNIFICANT CHANGE UP (ref 135–145)
SP GR SPEC: >1.05 (ref 1.01–1.02)
UROBILINOGEN FLD QL: NEGATIVE — SIGNIFICANT CHANGE UP
WBC # BLD: 16.32 K/UL — HIGH (ref 3.8–10.5)
WBC # FLD AUTO: 16.32 K/UL — HIGH (ref 3.8–10.5)
WBC UR QL: 124 /HPF — HIGH (ref 0–5)

## 2020-01-08 PROCEDURE — 99285 EMERGENCY DEPT VISIT HI MDM: CPT

## 2020-01-08 PROCEDURE — 74177 CT ABD & PELVIS W/CONTRAST: CPT | Mod: 26

## 2020-01-08 PROCEDURE — 99221 1ST HOSP IP/OBS SF/LOW 40: CPT | Mod: AI,GC

## 2020-01-08 RX ORDER — INSULIN LISPRO 100/ML
VIAL (ML) SUBCUTANEOUS AT BEDTIME
Refills: 0 | Status: DISCONTINUED | OUTPATIENT
Start: 2020-01-08 | End: 2020-01-09

## 2020-01-08 RX ORDER — ASCORBIC ACID 60 MG
500 TABLET,CHEWABLE ORAL DAILY
Refills: 0 | Status: DISCONTINUED | OUTPATIENT
Start: 2020-01-08 | End: 2020-01-16

## 2020-01-08 RX ORDER — ASPIRIN/CALCIUM CARB/MAGNESIUM 324 MG
81 TABLET ORAL DAILY
Refills: 0 | Status: DISCONTINUED | OUTPATIENT
Start: 2020-01-08 | End: 2020-01-09

## 2020-01-08 RX ORDER — DEXTROSE 50 % IN WATER 50 %
25 SYRINGE (ML) INTRAVENOUS ONCE
Refills: 0 | Status: DISCONTINUED | OUTPATIENT
Start: 2020-01-08 | End: 2020-01-09

## 2020-01-08 RX ORDER — ERTAPENEM SODIUM 1 G/1
1000 INJECTION, POWDER, LYOPHILIZED, FOR SOLUTION INTRAMUSCULAR; INTRAVENOUS EVERY 24 HOURS
Refills: 0 | Status: DISCONTINUED | OUTPATIENT
Start: 2020-01-09 | End: 2020-01-13

## 2020-01-08 RX ORDER — DEXTROSE 50 % IN WATER 50 %
15 SYRINGE (ML) INTRAVENOUS ONCE
Refills: 0 | Status: DISCONTINUED | OUTPATIENT
Start: 2020-01-08 | End: 2020-01-09

## 2020-01-08 RX ORDER — PANTOPRAZOLE SODIUM 20 MG/1
40 TABLET, DELAYED RELEASE ORAL
Refills: 0 | Status: DISCONTINUED | OUTPATIENT
Start: 2020-01-08 | End: 2020-01-16

## 2020-01-08 RX ORDER — BUDESONIDE AND FORMOTEROL FUMARATE DIHYDRATE 160; 4.5 UG/1; UG/1
2 AEROSOL RESPIRATORY (INHALATION) AT BEDTIME
Refills: 0 | Status: DISCONTINUED | OUTPATIENT
Start: 2020-01-08 | End: 2020-01-16

## 2020-01-08 RX ORDER — SODIUM CHLORIDE 9 MG/ML
1000 INJECTION, SOLUTION INTRAVENOUS
Refills: 0 | Status: DISCONTINUED | OUTPATIENT
Start: 2020-01-08 | End: 2020-01-09

## 2020-01-08 RX ORDER — DEXTROSE 50 % IN WATER 50 %
12.5 SYRINGE (ML) INTRAVENOUS ONCE
Refills: 0 | Status: DISCONTINUED | OUTPATIENT
Start: 2020-01-08 | End: 2020-01-09

## 2020-01-08 RX ORDER — FUROSEMIDE 40 MG
40 TABLET ORAL
Refills: 0 | Status: DISCONTINUED | OUTPATIENT
Start: 2020-01-08 | End: 2020-01-14

## 2020-01-08 RX ORDER — SPIRONOLACTONE 25 MG/1
25 TABLET, FILM COATED ORAL DAILY
Refills: 0 | Status: DISCONTINUED | OUTPATIENT
Start: 2020-01-08 | End: 2020-01-14

## 2020-01-08 RX ORDER — VANCOMYCIN HCL 1 G
2000 VIAL (EA) INTRAVENOUS ONCE
Refills: 0 | Status: COMPLETED | OUTPATIENT
Start: 2020-01-08 | End: 2020-01-08

## 2020-01-08 RX ORDER — ATORVASTATIN CALCIUM 80 MG/1
20 TABLET, FILM COATED ORAL AT BEDTIME
Refills: 0 | Status: DISCONTINUED | OUTPATIENT
Start: 2020-01-08 | End: 2020-01-16

## 2020-01-08 RX ORDER — INSULIN LISPRO 100/ML
VIAL (ML) SUBCUTANEOUS
Refills: 0 | Status: DISCONTINUED | OUTPATIENT
Start: 2020-01-08 | End: 2020-01-09

## 2020-01-08 RX ORDER — ENOXAPARIN SODIUM 100 MG/ML
40 INJECTION SUBCUTANEOUS DAILY
Refills: 0 | Status: DISCONTINUED | OUTPATIENT
Start: 2020-01-09 | End: 2020-01-09

## 2020-01-08 RX ORDER — FOLIC ACID 0.8 MG
1 TABLET ORAL DAILY
Refills: 0 | Status: DISCONTINUED | OUTPATIENT
Start: 2020-01-08 | End: 2020-01-16

## 2020-01-08 RX ORDER — LORATADINE 10 MG/1
10 TABLET ORAL DAILY
Refills: 0 | Status: DISCONTINUED | OUTPATIENT
Start: 2020-01-08 | End: 2020-01-16

## 2020-01-08 RX ORDER — GLUCAGON INJECTION, SOLUTION 0.5 MG/.1ML
1 INJECTION, SOLUTION SUBCUTANEOUS ONCE
Refills: 0 | Status: DISCONTINUED | OUTPATIENT
Start: 2020-01-08 | End: 2020-01-09

## 2020-01-08 RX ORDER — MONTELUKAST 4 MG/1
10 TABLET, CHEWABLE ORAL DAILY
Refills: 0 | Status: DISCONTINUED | OUTPATIENT
Start: 2020-01-08 | End: 2020-01-16

## 2020-01-08 RX ORDER — ERTAPENEM SODIUM 1 G/1
INJECTION, POWDER, LYOPHILIZED, FOR SOLUTION INTRAMUSCULAR; INTRAVENOUS
Refills: 0 | Status: DISCONTINUED | OUTPATIENT
Start: 2020-01-08 | End: 2020-01-13

## 2020-01-08 RX ORDER — METRONIDAZOLE 500 MG
500 TABLET ORAL ONCE
Refills: 0 | Status: COMPLETED | OUTPATIENT
Start: 2020-01-08 | End: 2020-01-08

## 2020-01-08 RX ORDER — ERTAPENEM SODIUM 1 G/1
1000 INJECTION, POWDER, LYOPHILIZED, FOR SOLUTION INTRAMUSCULAR; INTRAVENOUS ONCE
Refills: 0 | Status: COMPLETED | OUTPATIENT
Start: 2020-01-08 | End: 2020-01-08

## 2020-01-08 RX ORDER — SODIUM CHLORIDE 9 MG/ML
1000 INJECTION, SOLUTION INTRAVENOUS
Refills: 0 | Status: DISCONTINUED | OUTPATIENT
Start: 2020-01-08 | End: 2020-01-08

## 2020-01-08 RX ORDER — ALBUTEROL 90 UG/1
1 AEROSOL, METERED ORAL EVERY 4 HOURS
Refills: 0 | Status: DISCONTINUED | OUTPATIENT
Start: 2020-01-08 | End: 2020-01-16

## 2020-01-08 RX ORDER — POTASSIUM CHLORIDE 20 MEQ
40 PACKET (EA) ORAL EVERY 4 HOURS
Refills: 0 | Status: COMPLETED | OUTPATIENT
Start: 2020-01-08 | End: 2020-01-09

## 2020-01-08 RX ADMIN — Medication 100 MILLIGRAM(S): at 14:41

## 2020-01-08 RX ADMIN — Medication 250 MILLIGRAM(S): at 16:28

## 2020-01-08 RX ADMIN — ERTAPENEM SODIUM 120 MILLIGRAM(S): 1 INJECTION, POWDER, LYOPHILIZED, FOR SOLUTION INTRAMUSCULAR; INTRAVENOUS at 20:02

## 2020-01-08 RX ADMIN — ATORVASTATIN CALCIUM 20 MILLIGRAM(S): 80 TABLET, FILM COATED ORAL at 21:40

## 2020-01-08 RX ADMIN — Medication 40 MILLIEQUIVALENT(S): at 21:40

## 2020-01-08 RX ADMIN — BUDESONIDE AND FORMOTEROL FUMARATE DIHYDRATE 2 PUFF(S): 160; 4.5 AEROSOL RESPIRATORY (INHALATION) at 21:40

## 2020-01-08 NOTE — H&P ADULT - ATTENDING COMMENTS
I have seen and examined the patient. I agree with the above surgery resident's note.  llq collection on ct  iv abx  IR for drain replacement

## 2020-01-08 NOTE — ED ADULT NURSE NOTE - PSH
AICD (Automatic Cardioverter/Defibrillator) Present  inserted in Aug, 2008. Due for battery change in 1 month. ( [employer]) . Inserted by Dr Duffy  S/P cholecystectomy    Status post left hemicolectomy

## 2020-01-08 NOTE — PATIENT PROFILE ADULT - FUNCTIONAL SCREEN CURRENT LEVEL: EATING, MLM
A1C 13.5 which means average blood sugar is 341 which we expected from the health screening visit. We started you on Metformin 500mg BID. Goal A1C is < 6.5 so we have a lot of work to do. I need you keep appt with Diabetes education we referred you to and you definitely need to establish an MD PCP and f/u in 3 months so they can recheck labs after being on meds to see if this comes down. Total cholesterol was 216(goal < 200), triglycerides elevated due to diabetes at 641(goal < 150), HDL good cholesterol 29(goal 40-75), continue the cholesterol med I started you on, follow fat diet, and exercise. Check lipids in 3 months with new PCP. Other labs ok. It is important that you f/u with an MD PCP after taking meds consistently and following the diet to recheck these levels.  
0 = independent

## 2020-01-08 NOTE — ED PROVIDER NOTE - OBJECTIVE STATEMENT
75yo F with PMH of DM, HTN, COPD/asthma not on home O2, Afib on Eliquis, CHF, gout, AICD who presents with abdominal pain and distention on the left side of her abdomen. Daughter is at bedside. Pt has hx of recent sigmoid diverticulosis in 7/19 s/p Delgado procedure with sigmoid stump leak and had abdominal abscess that required drain x2 with EC fistula pouched. Drain site on L anterior abdomen. Pt has ostomy bag in place R abdomen. No fever, chills, N/V/D, shortness of breath, chest pain.

## 2020-01-08 NOTE — H&P ADULT - HISTORY OF PRESENT ILLNESS
75 yo female with history of DM2, HTN, COPD, Aflutter on Eliquis (last dose last night) and HF with AICD ([employer], interrogated on 09/02/2019) as 75 yo female with history of DM2, HTN, COPD, Aflutter on Eliquis (last dose last night) and HF with AICD ([employer], interrogated on 09/02/2019), and perforated diverticulitis requiring Lupe in July 2019 which complicated by enterocutaneous fistula and multiple abscesses requiring IR drainage (most recently in Dec 2019 on the left anterior abdominal wall) came in with abdominal pain for 1 day. Patient was discharged to [facility] on 12/19/2019 with IR drain in the left abdominal abscess cavity which fell out about 1 week ago. Patient started having left abdominal pain with swelling at the prior abscess site 1 day ago without drainage. Patient denies fever or chills. 75 yo female with history of DM2, HTN, COPD, Aflutter on Eliquis (last dose last night) and HF with AICD ([employer], interrogated on 09/02/2019), and perforated diverticulitis requiring Lupe in July 2019 which complicated by enterocutaneous fistula and multiple abscesses requiring IR drainage (most recently in Dec 2019 on the left anterior abdominal wall) came in with abdominal pain for 1 day. Patient was discharged to [facility] on 12/19/2019 with IR drain in the left abdominal abscess cavity which fell out about 1 week ago. Patient started having left abdominal pain with swelling at the prior abscess site 1 day ago without drainage. Patient denies nausea, vomiting, fever or chills. Her ostomy is functioning.

## 2020-01-08 NOTE — ED PROVIDER NOTE - CLINICAL SUMMARY MEDICAL DECISION MAKING FREE TEXT BOX
Jered Ojeda MD, FACEP: In this physician's medical judgement based on clinical history and physical exam, patient with abdominal pain, decreased activity and recent drain that has been dislodged for post operative collection of purulence that is now tender with discharge.   Will get iv, cbc, cmp, analgesia and antiemetic prn, ct a/p with iv contrast and reassess  Will follow up on labs, analgesia, imaging, reassess and disposition to the inpatient team as clinically indicated.

## 2020-01-08 NOTE — ED PROVIDER NOTE - CARE PLAN
Principal Discharge DX:	Abscess Principal Discharge DX:	Abscess  Goal:	abdominal infection initial encounter

## 2020-01-08 NOTE — ED ADULT NURSE NOTE - OBJECTIVE STATEMENT
Pt c/o abdominal pain, redness, swelling. Pt c/o abdominal pain, redness, swelling.    Pt refuses rectal temp and states she is continent of urine and will give sample when she is able to. Pt c/o left lower abdominal pain, redness, swelling.  Hx DM2, HTN, COPD, Aflutter (Eliquis), HF/AICD, and perforated diverticulitis requiring Lupe in July 2019 c/b enterocutaneous fistula and multiple abscesses requiring IR drainage (most recently in Dec 2019 on the left anterior abdominal wall).  IR drain to left abdominal abscess fell out accidentally one week ago, was seen here and told that drain could stay out at that time.  Arrives with dressings over abdomen and refuses abdominal exam from RN.  Pt is conversive, +abd pain, took tylenol which did not help but reports that she cannot take anything else, right sided ostomy without any drainage at this time, reports no changes in drainage, denies cp, sob, fevers, urinary symptoms, n/v/d.  Pt refuses rectal temp and states she is continent of urine and will give sample when she is able to.

## 2020-01-08 NOTE — H&P ADULT - NSICDXPASTMEDICALHX_GEN_ALL_CORE_FT
PAST MEDICAL HISTORY:  Asthma     Atrial flutter     Cardiac Pacemaker     Cardiomyopathy     Congestive Heart Failure     Diabetes     Diverticulitis     Gout     HTN - Hypertension     Kidney stone     Patient is Confucianist     Refusal of blood transfusions as patient is Confucianist     Vertigo

## 2020-01-08 NOTE — ED PROVIDER NOTE - PMH
Asthma    Atrial flutter    Cardiac Pacemaker    Cardiomyopathy    Congestive Heart Failure    Diabetes    Diverticulitis    Gout    HTN - Hypertension    Kidney stone    Patient is Taoist    Refusal of blood transfusions as patient is Taoist    Vertigo

## 2020-01-08 NOTE — H&P ADULT - NSHPLABSRESULTS_GEN_ALL_CORE
CBC (01-08 @ 13:05)                          9.1<L>                   16.32<H>  )--------------(  255        78.6<H>% Neuts, 11.3<L>% Lymphs, ANC: 12.83<H>                          28.4<L>    BMP (01-08 @ 13:05)       135     |  98      |  28<H> 			Ca++ --      Ca 9.1          ---------------------------------( 90    		Mg --           3.3<L>  |  24      |  0.78  			Ph --        LFTs (01-08 @ 13:05)      TPro 7.0 / Alb 3.0<L> / TBili 0.4 / DBili -- / AST 14 / ALT 7<L> / AlkPhos 73    VBG (01-08 @ 13:05)     7.43 / 42 / 47<H> / 27 / 3.2<H> / 78%      Lactate: 1.9    EXAM:  CT ABDOMEN AND PELVIS IC                       PROCEDURE DATE:  01/08/2020      FINDINGS:  LOWER CHEST: Within normal limits.  LIVER: Within normal limits.  BILE DUCTS: Normal caliber.  GALLBLADDER: Cholecystectomy.  SPLEEN: Within normal limits.  PANCREAS: Within normal limits.  ADRENALS: Within normal limits.  KIDNEYS/URETERS: Nonobstructing left renal calculi, unchanged. No hydronephrosis, symmetric enhancement.  BLADDER: Within normal limits.  REPRODUCTIVE ORGANS: Fibroid uterus.  BOWEL: Status post Delgado's procedure with left hemicolectomy and right-sided ostomy. Intraluminal contrast is present within the rectum and sigmoid colon. No bowel obstruction. Appendix is normal.  PERITONEUM: No ascites.  VESSELS: Atherosclerotic changes.  RETROPERITONEUM/LYMPH NODES: No lymphadenopathy.    ABDOMINAL WALL: Right lateral abdominal wall colostomy. Large anterior abdominal wall hernia with rectus diastasis, unchanged. 7.3 x 1.8 cm left lateral abdominal wall collection with high density material, is new since 12/31/2019. Few linear tracts of soft tissue are seen coursing to the skin surface, corresponding to the drainage catheter tracts. Additional inflammatory tracts are seen coursing into the left paracolic gutter and inferior abdominal wall, decreased since the prior study. The previously noted enterocutaneous fistula arising from a Delgado's pouch is poorly visualized with the tract no longer opacified by contrast.  BONES: Degenerative changes.    IMPRESSION:   A new 7.3 x 1.8 cm left lateral abdominal collection.

## 2020-01-08 NOTE — H&P ADULT - NSICDXPASTSURGICALHX_GEN_ALL_CORE_FT
PAST SURGICAL HISTORY:  AICD (Automatic Cardioverter/Defibrillator) Present inserted in Aug, 2008. Due for battery change in 1 month. ( [facility]) . Inserted by Dr Duffy    S/P cholecystectomy     Status post left hemicolectomy

## 2020-01-08 NOTE — ED ADULT NURSE NOTE - PMH
Asthma    Atrial flutter    Cardiac Pacemaker    Cardiomyopathy    Congestive Heart Failure    Diabetes    Diverticulitis    Gout    HTN - Hypertension    Kidney stone    Patient is Jain    Refusal of blood transfusions as patient is Jain    Vertigo

## 2020-01-08 NOTE — ED PROVIDER NOTE - ATTENDING CONTRIBUTION TO CARE
Agree with above, Jered Ojeda MD, FACEP  See MDM above.  Based on patient's history and physical exam, as well as the results of today's workup, I feel that patient warrants admission to the hospital for further workup/evaluation and continued management. I discussed the findings of today's workup with the patient and addressed the patient's questions and concerns. The patient was agreeable with admission. Our team spoke with the inpatient receiving team who accepted the patient for admission and subsequently took over the patient's care at the time of admission.

## 2020-01-08 NOTE — H&P ADULT - SCARS
location/anterior abdomen, ECF on the lower abdomen with minimal output, prior drain sites noted without discharge

## 2020-01-09 LAB
ANION GAP SERPL CALC-SCNC: 12 MMOL/L — SIGNIFICANT CHANGE UP (ref 5–17)
APTT BLD: 30.8 SEC — SIGNIFICANT CHANGE UP (ref 27.5–36.3)
BUN SERPL-MCNC: 19 MG/DL — SIGNIFICANT CHANGE UP (ref 7–23)
CALCIUM SERPL-MCNC: 9.1 MG/DL — SIGNIFICANT CHANGE UP (ref 8.4–10.5)
CHLORIDE SERPL-SCNC: 99 MMOL/L — SIGNIFICANT CHANGE UP (ref 96–108)
CO2 SERPL-SCNC: 24 MMOL/L — SIGNIFICANT CHANGE UP (ref 22–31)
CREAT SERPL-MCNC: 0.79 MG/DL — SIGNIFICANT CHANGE UP (ref 0.5–1.3)
GLUCOSE BLDC GLUCOMTR-MCNC: 107 MG/DL — HIGH (ref 70–99)
GLUCOSE BLDC GLUCOMTR-MCNC: 121 MG/DL — HIGH (ref 70–99)
GLUCOSE BLDC GLUCOMTR-MCNC: 149 MG/DL — HIGH (ref 70–99)
GLUCOSE BLDC GLUCOMTR-MCNC: 259 MG/DL — HIGH (ref 70–99)
GLUCOSE SERPL-MCNC: 135 MG/DL — HIGH (ref 70–99)
GRAM STN FLD: SIGNIFICANT CHANGE UP
HCT VFR BLD CALC: 27.8 % — LOW (ref 34.5–45)
HGB BLD-MCNC: 9 G/DL — LOW (ref 11.5–15.5)
INR BLD: 1.7 RATIO — HIGH (ref 0.88–1.16)
MAGNESIUM SERPL-MCNC: 1.7 MG/DL — SIGNIFICANT CHANGE UP (ref 1.6–2.6)
MCHC RBC-ENTMCNC: 32.4 GM/DL — SIGNIFICANT CHANGE UP (ref 32–36)
MCHC RBC-ENTMCNC: 33.2 PG — SIGNIFICANT CHANGE UP (ref 27–34)
MCV RBC AUTO: 102.6 FL — HIGH (ref 80–100)
NRBC # BLD: 0 /100 WBCS — SIGNIFICANT CHANGE UP (ref 0–0)
PHOSPHATE SERPL-MCNC: 3.1 MG/DL — SIGNIFICANT CHANGE UP (ref 2.5–4.5)
PLATELET # BLD AUTO: 256 K/UL — SIGNIFICANT CHANGE UP (ref 150–400)
POTASSIUM SERPL-MCNC: 3.9 MMOL/L — SIGNIFICANT CHANGE UP (ref 3.5–5.3)
POTASSIUM SERPL-SCNC: 3.9 MMOL/L — SIGNIFICANT CHANGE UP (ref 3.5–5.3)
PROTHROM AB SERPL-ACNC: 19.9 SEC — HIGH (ref 10–12.9)
RBC # BLD: 2.71 M/UL — LOW (ref 3.8–5.2)
RBC # FLD: 13.2 % — SIGNIFICANT CHANGE UP (ref 10.3–14.5)
SODIUM SERPL-SCNC: 135 MMOL/L — SIGNIFICANT CHANGE UP (ref 135–145)
SPECIMEN SOURCE: SIGNIFICANT CHANGE UP
WBC # BLD: 15.36 K/UL — HIGH (ref 3.8–10.5)
WBC # FLD AUTO: 15.36 K/UL — HIGH (ref 3.8–10.5)

## 2020-01-09 PROCEDURE — 10030 IMG GID FLU COLL DRG SFT TIS: CPT

## 2020-01-09 RX ORDER — DEXTROSE 50 % IN WATER 50 %
25 SYRINGE (ML) INTRAVENOUS ONCE
Refills: 0 | Status: DISCONTINUED | OUTPATIENT
Start: 2020-01-09 | End: 2020-01-16

## 2020-01-09 RX ORDER — SODIUM CHLORIDE 9 MG/ML
1000 INJECTION, SOLUTION INTRAVENOUS
Refills: 0 | Status: DISCONTINUED | OUTPATIENT
Start: 2020-01-09 | End: 2020-01-09

## 2020-01-09 RX ORDER — ENOXAPARIN SODIUM 100 MG/ML
40 INJECTION SUBCUTANEOUS DAILY
Refills: 0 | Status: DISCONTINUED | OUTPATIENT
Start: 2020-01-09 | End: 2020-01-12

## 2020-01-09 RX ORDER — MAGNESIUM SULFATE 500 MG/ML
1 VIAL (ML) INJECTION ONCE
Refills: 0 | Status: COMPLETED | OUTPATIENT
Start: 2020-01-09 | End: 2020-01-09

## 2020-01-09 RX ORDER — GLUCAGON INJECTION, SOLUTION 0.5 MG/.1ML
1 INJECTION, SOLUTION SUBCUTANEOUS ONCE
Refills: 0 | Status: DISCONTINUED | OUTPATIENT
Start: 2020-01-09 | End: 2020-01-16

## 2020-01-09 RX ORDER — ACETAMINOPHEN 500 MG
975 TABLET ORAL ONCE
Refills: 0 | Status: COMPLETED | OUTPATIENT
Start: 2020-01-09 | End: 2020-01-09

## 2020-01-09 RX ORDER — DEXTROSE 50 % IN WATER 50 %
12.5 SYRINGE (ML) INTRAVENOUS ONCE
Refills: 0 | Status: DISCONTINUED | OUTPATIENT
Start: 2020-01-09 | End: 2020-01-16

## 2020-01-09 RX ORDER — INSULIN LISPRO 100/ML
VIAL (ML) SUBCUTANEOUS
Refills: 0 | Status: DISCONTINUED | OUTPATIENT
Start: 2020-01-09 | End: 2020-01-16

## 2020-01-09 RX ORDER — DEXTROSE 50 % IN WATER 50 %
15 SYRINGE (ML) INTRAVENOUS ONCE
Refills: 0 | Status: DISCONTINUED | OUTPATIENT
Start: 2020-01-09 | End: 2020-01-16

## 2020-01-09 RX ORDER — INSULIN LISPRO 100/ML
VIAL (ML) SUBCUTANEOUS EVERY 6 HOURS
Refills: 0 | Status: DISCONTINUED | OUTPATIENT
Start: 2020-01-09 | End: 2020-01-09

## 2020-01-09 RX ORDER — SODIUM CHLORIDE 9 MG/ML
1000 INJECTION, SOLUTION INTRAVENOUS
Refills: 0 | Status: DISCONTINUED | OUTPATIENT
Start: 2020-01-09 | End: 2020-01-16

## 2020-01-09 RX ORDER — INSULIN LISPRO 100/ML
VIAL (ML) SUBCUTANEOUS AT BEDTIME
Refills: 0 | Status: DISCONTINUED | OUTPATIENT
Start: 2020-01-09 | End: 2020-01-16

## 2020-01-09 RX ADMIN — LORATADINE 10 MILLIGRAM(S): 10 TABLET ORAL at 14:21

## 2020-01-09 RX ADMIN — Medication 975 MILLIGRAM(S): at 16:40

## 2020-01-09 RX ADMIN — Medication 1: at 22:48

## 2020-01-09 RX ADMIN — Medication 40 MILLIEQUIVALENT(S): at 05:40

## 2020-01-09 RX ADMIN — ERTAPENEM SODIUM 120 MILLIGRAM(S): 1 INJECTION, POWDER, LYOPHILIZED, FOR SOLUTION INTRAMUSCULAR; INTRAVENOUS at 18:18

## 2020-01-09 RX ADMIN — ATORVASTATIN CALCIUM 20 MILLIGRAM(S): 80 TABLET, FILM COATED ORAL at 21:35

## 2020-01-09 RX ADMIN — MONTELUKAST 10 MILLIGRAM(S): 4 TABLET, CHEWABLE ORAL at 14:21

## 2020-01-09 RX ADMIN — PANTOPRAZOLE SODIUM 40 MILLIGRAM(S): 20 TABLET, DELAYED RELEASE ORAL at 05:41

## 2020-01-09 RX ADMIN — BUDESONIDE AND FORMOTEROL FUMARATE DIHYDRATE 2 PUFF(S): 160; 4.5 AEROSOL RESPIRATORY (INHALATION) at 21:35

## 2020-01-09 RX ADMIN — Medication 40 MILLIGRAM(S): at 18:18

## 2020-01-09 RX ADMIN — Medication 500 MILLIGRAM(S): at 14:21

## 2020-01-09 RX ADMIN — Medication 975 MILLIGRAM(S): at 17:10

## 2020-01-09 RX ADMIN — Medication 100 GRAM(S): at 14:22

## 2020-01-09 RX ADMIN — ENOXAPARIN SODIUM 40 MILLIGRAM(S): 100 INJECTION SUBCUTANEOUS at 14:21

## 2020-01-09 RX ADMIN — Medication 1 MILLIGRAM(S): at 14:21

## 2020-01-09 NOTE — PROGRESS NOTE ADULT - SUBJECTIVE AND OBJECTIVE BOX
Interventional Radiology Brief Post-Procedure Note    Procedure: Left abdominal wall drainage catheter placement.    Operators: Luis Miguel Walker MD    Anesthesia (type): Provided by anesthesiology attending.    Contrast: None.    EBL: Minimal.     Findings/Follow up Plan of Care: Successful 10 [place] drainage catheter placement into left abdominal wall collection.    Specimens Removed: 20 mL purulent material left anterior abdominal wall.    Implants: 10 [place] pigtail catheter.    Complications: No immediate complications.     Condition/Disposition: To recovery room.     Please call Interventional Radiology x [phone] with any questions, concerns, or issues.

## 2020-01-09 NOTE — PHYSICAL THERAPY INITIAL EVALUATION ADULT - ADDITIONAL COMMENTS
Pt lives in private home with daughter 6 steps to enter. As per daughter, patient requires someassistance with ADLs prior to hospitalization - [facility] Patient has cane, rolling walker, rollator, wheelchair, raised toiletseat/safety bars in shower, & has functional glucometer at home. Pt admitted from rehab.

## 2020-01-09 NOTE — PROGRESS NOTE ADULT - ASSESSMENT
75 yo female with perforated diverticulitis s/p Lupe in July 2019 complicated by ECF and multiple abscess requiring IR drainage with most recent one done in Dec 2019 presented from rehab with left abdominal pain after the IR drainage catheter fell off 1 week ago, found to have reaccumulation of left abdominal wall abscess measuring 9 x 1.8 cm, associated with leukocytosis without fever. Of note, there is hyper-enhancing material seen in the posterior wall of the abscess cavity possible residual contrast from hypaque enema done on 12/18 suggestive of fistulization from rectal stump.     PLAN:  - IR procedure today  - c/w abx, adjust accordingly with culture results    Green Surgery  [id number]

## 2020-01-09 NOTE — PROGRESS NOTE ADULT - SUBJECTIVE AND OBJECTIVE BOX
Surgery Post-Operative Note    SUBJECTIVE:  - Patient states feeling better  --------------------------------------------------------------------------------------------------  OBJECTIVE:   Physical Exam:  General: AAOx3, NAD, lying comfortably in bed  HEENT: NC/AT  Respiratory: nonlabored breathing  Cardiovascular: RRR, normal S1 and S2, no murmurs or gallops  Abdomen: non-distended, soft, non-tender  Ostomy: 0mL/ 8 hrs  --------------------------------------------------------------------------------------------------  V/S:  Vital Signs Last 24 Hrs  T(C): 37.4 (09 Jan 2020 05:40), Max: 37.7 (09 Jan 2020 00:20)  T(F): 99.3 (09 Jan 2020 05:40), Max: 99.8 (09 Jan 2020 00:20)  HR: 108 (09 Jan 2020 05:40) (87 - 108)  BP: 100/64 (09 Jan 2020 05:40) (92/56 - 103/56)  BP(mean): --  RR: 18 (09 Jan 2020 05:40) (16 - 18)  SpO2: 95% (09 Jan 2020 05:40) (95% - 98%)    --------------------------------------------------------------------------------------------------  I/Os:    08 Jan 2020 07:01  -  09 Jan 2020 05:47  --------------------------------------------------------  IN:  Total IN: 0 mL    OUT:  Total OUT: 0 mL    Total NET: 0 mL        --------------------------------------------------------------------------------------------------  LABS:                        9.1    16.32 )-----------( 255      ( 08 Jan 2020 13:05 )             28.4     08 Jan 2020 13:05    135    |  98     |  28     ----------------------------<  90     3.3     |  24     |  0.78     Ca    9.1        08 Jan 2020 13:05    TPro  7.0    /  Alb  3.0    /  TBili  0.4    /  DBili  x      /  AST  14     /  ALT  7      /  AlkPhos  73     08 Jan 2020 13:05      CAPILLARY BLOOD GLUCOSE      POCT Blood Glucose.: 149 mg/dL (09 Jan 2020 05:32)  POCT Blood Glucose.: 97 mg/dL (08 Jan 2020 21:25)        LIVER FUNCTIONS - ( 08 Jan 2020 13:05 )  Alb: 3.0 g/dL / Pro: 7.0 g/dL / ALK PHOS: 73 U/L / ALT: 7 U/L / AST: 14 U/L / GGT: x             Urinalysis Basic - ( 08 Jan 2020 20:45 )    Color: Light Yellow / Appearance: Clear / SG: >1.050 / pH: x  Gluc: x / Ketone: Negative  / Bili: Negative / Urobili: Negative   Blood: x / Protein: Trace / Nitrite: Negative   Leuk Esterase: Large / RBC: 1 /hpf /  /HPF   Sq Epi: x / Non Sq Epi: 1 /hpf / Bacteria: Negative      --------------------------------------------------------------------------------------------------  MEDICATIONS  (STANDING):  ascorbic acid 500 milliGRAM(s) Oral daily  atorvastatin 20 milliGRAM(s) Oral at bedtime  budesonide 160 MICROgram(s)/formoterol 4.5 MICROgram(s) Inhaler 2 Puff(s) Inhalation at bedtime  dextrose 5%. 1000 milliLiter(s) (50 mL/Hr) IV Continuous <Continuous>  dextrose 50% Injectable 12.5 Gram(s) IV Push once  dextrose 50% Injectable 25 Gram(s) IV Push once  dextrose 50% Injectable 25 Gram(s) IV Push once  enalapril 10 milliGRAM(s) Oral two times a day  ertapenem  IVPB      ertapenem  IVPB 1000 milliGRAM(s) IV Intermittent every 24 hours  folic acid 1 milliGRAM(s) Oral daily  furosemide    Tablet 40 milliGRAM(s) Oral two times a day  insulin lispro (HumaLOG) corrective regimen sliding scale   SubCutaneous every 6 hours  insulin lispro (HumaLOG) corrective regimen sliding scale   SubCutaneous at bedtime  lactated ringers. 1000 milliLiter(s) (40 mL/Hr) IV Continuous <Continuous>  loratadine 10 milliGRAM(s) Oral daily  montelukast 10 milliGRAM(s) Oral daily  pantoprazole    Tablet 40 milliGRAM(s) Oral before breakfast  spironolactone 25 milliGRAM(s) Oral daily    MEDICATIONS  (PRN):  ALBUTerol    90 MICROgram(s) HFA Inhaler 1 Puff(s) Inhalation every 4 hours PRN Wheezing  dextrose 40% Gel 15 Gram(s) Oral once PRN Blood Glucose LESS THAN 70 milliGRAM(s)/deciliter  glucagon  Injectable 1 milliGRAM(s) IntraMuscular once PRN Glucose LESS THAN 70 milligrams/deciliter    --------------------------------------------------------------------------------------------------

## 2020-01-09 NOTE — PHYSICAL THERAPY INITIAL EVALUATION ADULT - PRECAUTIONS/LIMITATIONS, REHAB EVAL
surgical precautions/Pt started having left abdominal pain with swelling at the prior abscess site 1 day ago without drainage. Her ostomy is functioning.  CTAbdomen/Pelvis: A new 7.3 x 1.8 cm left lateral abdominal collection./fall precautions

## 2020-01-09 NOTE — PHYSICAL THERAPY INITIAL EVALUATION ADULT - PERTINENT HX OF CURRENT PROBLEM, REHAB EVAL
73y/o F PMH perforated diverticulitis requiring Lupe in July 2019 which complicated by enterocutaneous fistula and multiple abscesses requiring IR drainage (most recently in Dec 2019 on the L anterior abdominal wall) came in with abdominal pain for 1 day. Pt was discharged to [facility]hab on 12/19/2019 with IR drain in the left abdominal abscess cavity which fell out about 1 week ago.

## 2020-01-09 NOTE — PROGRESS NOTE ADULT - SUBJECTIVE AND OBJECTIVE BOX
Vascular & Interventional Radiology Pre-Procedure Note    Procedure Name: Abdominal wall abscess drainage possible drain placement    HPI: 74y Female with history of multiple fluid collections found to have recent accidental removal of left abdominal wall fluid collection now with recurrent left abdominal fluid collection. Drain placement is requested.    Allergies: Coreg (Other)  digoxin (Other; Short breath (Mild to Mod))  penicillins (Hives)    Medications (Abx/Cardiac/Anticoagulation/Blood Products)    ertapenem  IVPB: 120 mL/Hr IV Intermittent (01-08 @ 20:02)  metroNIDAZOLE  IVPB: 100 mL/Hr IV Intermittent (01-08 @ 14:41)  vancomycin  IVPB: 250 mL/Hr IV Intermittent (01-08 @ 16:28)    Data:  162.56  64  T(C): 36.8  HR: 104  BP: 97/55  RR: 18  SpO2: 94%      -WBC 15.36 / HgB 9.0 / Hct 27.8 / Plt 256  -Na 135 / Cl 99 / BUN 19 / Glucose 135  -K 3.9 / CO2 24 / Cr 0.79  -ALT -- / Alk Phos -- / T.Bili --  -INR1.70    Plan:   -74y Female presents for drainage of left abdominal wall fluid collection. Procedure and risks discussed with patient and she is agreeable to proceed.   -Risks/Benefits/alternatives explained with the patient and/or healthcare proxy and witnessed informed consent obtained.

## 2020-01-10 LAB
APTT BLD: 29.4 SEC — SIGNIFICANT CHANGE UP (ref 27.5–36.3)
GLUCOSE BLDC GLUCOMTR-MCNC: 129 MG/DL — HIGH (ref 70–99)
GLUCOSE BLDC GLUCOMTR-MCNC: 142 MG/DL — HIGH (ref 70–99)
GLUCOSE BLDC GLUCOMTR-MCNC: 159 MG/DL — HIGH (ref 70–99)
GLUCOSE BLDC GLUCOMTR-MCNC: 90 MG/DL — SIGNIFICANT CHANGE UP (ref 70–99)
HCT VFR BLD CALC: 27.4 % — LOW (ref 34.5–45)
HGB BLD-MCNC: 8.6 G/DL — LOW (ref 11.5–15.5)
INR BLD: 1.29 RATIO — HIGH (ref 0.88–1.16)
MCHC RBC-ENTMCNC: 31.4 GM/DL — LOW (ref 32–36)
MCHC RBC-ENTMCNC: 32.6 PG — SIGNIFICANT CHANGE UP (ref 27–34)
MCV RBC AUTO: 103.8 FL — HIGH (ref 80–100)
NRBC # BLD: 0 /100 WBCS — SIGNIFICANT CHANGE UP (ref 0–0)
PLATELET # BLD AUTO: 237 K/UL — SIGNIFICANT CHANGE UP (ref 150–400)
PROTHROM AB SERPL-ACNC: 14.8 SEC — HIGH (ref 10–12.9)
RBC # BLD: 2.64 M/UL — LOW (ref 3.8–5.2)
RBC # FLD: 12.9 % — SIGNIFICANT CHANGE UP (ref 10.3–14.5)
WBC # BLD: 10.58 K/UL — HIGH (ref 3.8–10.5)
WBC # FLD AUTO: 10.58 K/UL — HIGH (ref 3.8–10.5)

## 2020-01-10 PROCEDURE — 99223 1ST HOSP IP/OBS HIGH 75: CPT

## 2020-01-10 RX ORDER — FERROUS SULFATE 325(65) MG
325 TABLET ORAL
Refills: 0 | Status: DISCONTINUED | OUTPATIENT
Start: 2020-01-10 | End: 2020-01-16

## 2020-01-10 RX ORDER — ACETAMINOPHEN 500 MG
975 TABLET ORAL ONCE
Refills: 0 | Status: COMPLETED | OUTPATIENT
Start: 2020-01-10 | End: 2020-01-10

## 2020-01-10 RX ORDER — ACETAMINOPHEN 500 MG
650 TABLET ORAL ONCE
Refills: 0 | Status: DISCONTINUED | OUTPATIENT
Start: 2020-01-10 | End: 2020-01-10

## 2020-01-10 RX ORDER — ACETAMINOPHEN 500 MG
650 TABLET ORAL EVERY 6 HOURS
Refills: 0 | Status: COMPLETED | OUTPATIENT
Start: 2020-01-10 | End: 2020-01-13

## 2020-01-10 RX ORDER — FERROUS SULFATE 325(65) MG
325 TABLET ORAL DAILY
Refills: 0 | Status: DISCONTINUED | OUTPATIENT
Start: 2020-01-10 | End: 2020-01-10

## 2020-01-10 RX ORDER — VANCOMYCIN HCL 1 G
1000 VIAL (EA) INTRAVENOUS EVERY 12 HOURS
Refills: 0 | Status: DISCONTINUED | OUTPATIENT
Start: 2020-01-10 | End: 2020-01-12

## 2020-01-10 RX ADMIN — Medication 250 MILLIGRAM(S): at 19:58

## 2020-01-10 RX ADMIN — Medication 10 MILLIGRAM(S): at 06:12

## 2020-01-10 RX ADMIN — Medication 975 MILLIGRAM(S): at 02:25

## 2020-01-10 RX ADMIN — PANTOPRAZOLE SODIUM 40 MILLIGRAM(S): 20 TABLET, DELAYED RELEASE ORAL at 06:13

## 2020-01-10 RX ADMIN — Medication 975 MILLIGRAM(S): at 03:00

## 2020-01-10 RX ADMIN — ERTAPENEM SODIUM 120 MILLIGRAM(S): 1 INJECTION, POWDER, LYOPHILIZED, FOR SOLUTION INTRAMUSCULAR; INTRAVENOUS at 18:36

## 2020-01-10 RX ADMIN — ATORVASTATIN CALCIUM 20 MILLIGRAM(S): 80 TABLET, FILM COATED ORAL at 19:58

## 2020-01-10 RX ADMIN — ENOXAPARIN SODIUM 40 MILLIGRAM(S): 100 INJECTION SUBCUTANEOUS at 13:03

## 2020-01-10 RX ADMIN — LORATADINE 10 MILLIGRAM(S): 10 TABLET ORAL at 13:03

## 2020-01-10 RX ADMIN — Medication 40 MILLIGRAM(S): at 17:11

## 2020-01-10 RX ADMIN — BUDESONIDE AND FORMOTEROL FUMARATE DIHYDRATE 2 PUFF(S): 160; 4.5 AEROSOL RESPIRATORY (INHALATION) at 19:58

## 2020-01-10 RX ADMIN — Medication 1: at 16:47

## 2020-01-10 RX ADMIN — Medication 500 MILLIGRAM(S): at 13:03

## 2020-01-10 RX ADMIN — MONTELUKAST 10 MILLIGRAM(S): 4 TABLET, CHEWABLE ORAL at 13:03

## 2020-01-10 RX ADMIN — Medication 1 MILLIGRAM(S): at 13:03

## 2020-01-10 RX ADMIN — Medication 650 MILLIGRAM(S): at 23:50

## 2020-01-10 NOTE — CONSULT NOTE ADULT - ASSESSMENT
73 yo female with history of DM2, HTN, COPD, Aflutter on Eliquis and HF with AICD ([employer], interrogated on 09/02/2019), and perforated diverticulitis requiring Lupe in July 2019 which complicated by enterocutaneous fistula and multiple abscesses requiring IR drainage (most recently in Dec 2019 on the left anterior abdominal wall) came in with abdominal pain for 1 day.     U/A with > 124 WBC (culture not sent but patient asymptomatic)  CT A/P (1/8) with A new 7.3 x 1.8 cm left lateral abdominal collection.  Underwent IR guided drainage of collection on 1/9  Abscess culture with GPC in Clusters on Gram stain    Favor giving 7 day course from IR drain insertion. Would cover with Vancomycin and Ertapenem pending cultures.     Overall, Abdominal Wall Collection, Leukocytosis, Positive Culture    --Recommend adding Vancomycin 1g IV Q12H (pending cultures from abscess drainage). Check trough prior to fourth dose.   --Continue Ertapenem 1g IV Q24H  --Follow up on prelim abdominal wall collection cultures  --Continue to follow CBC with diff  --Continue to follow temperature curve  --Follow up on preliminary blood cultures    I will continue to follow. Please feel free to contact me with any further questions.    Rex Goode M.D.  [facility] Division of Infectious Disease  8AM-5PM: Pager Number [phone]  After Hours (or if no response): Please contact the Infectious Diseases Office at [phone]

## 2020-01-10 NOTE — PROGRESS NOTE ADULT - ASSESSMENT
75 yo female with perforated diverticulitis s/p Lupe in July 2019 complicated by ECF and multiple abscess requiring IR drainage with most recent one done in Dec 2019, c/b rectal stump leak and fistula no appears to be closed, presented from rehab with left abdominal pain after the IR drainage catheter fell off 1 week ago, found to have reaccumulation of left abdominal wall abscess measuring 9 x 1.8 cm, now s/p IR drain placement 1/9.     PLAN:  - regular diet  - c/w abx, f/u IR cultures for sensitivities   - d/c: MATT per PT eval    Green Surgery  [id number] 75 yo female with perforated diverticulitis s/p Lupe in July 2019 complicated by ECF and multiple abscess requiring IR drainage with most recent one done in Dec 2019, c/b rectal stump leak and fistula no appears to be closed, presented from rehab with left abdominal pain after the IR drainage catheter fell off 1 week ago, found to have reaccumulation of left abdominal wall abscess measuring 9 x 1.8 cm, now s/p IR drain placement 1/9.     PLAN:  - regular diet  - c/w abx (ertapenem), f/u IR cultures for sensitivities   - d/c: MATT per PT eval    Green Surgery  [id number] 75 yo female with perforated diverticulitis s/p Lupe in July 2019 complicated by ECF and multiple abscess requiring IR drainage with most recent one done in Dec 2019, c/b rectal stump leak and fistula S/P endoscopic Ovesco "Bear claw" clip placement in December , presented from rehab with left abdominal pain after the IR drainage catheter fell off 1 week ago, found to have reaccumulation of left abdominal wall abscess measuring 9 x 1.8 cm, now s/p IR drain placement 1/9.     PLAN:  - regular diet  - c/w abx (ertapenem), f/u IR cultures for sensitivities   - d/c: MATT per PT eval    Green Surgery  [id number]

## 2020-01-10 NOTE — PROGRESS NOTE ADULT - SUBJECTIVE AND OBJECTIVE BOX
SURGERY PROGRESS NOTE    Subjective:   s/p IR 10Fr drain placement in L. abdominal wall collection yesterday am w/ 20 cc purulent output (xx mL/24hr)  Tolerating diet  Continues to feel pain in left abdominal wall. Controlled with oral pain medication  GI fx +/+  Ambulating with assistance.     Objective: SURGERY PROGRESS NOTE    Subjective:   s/p IR 10Fr drain placement in L. abdominal wall collection yesterday am w/ 20 cc purulent output (xx mL/24hr)  Tolerating diet  Continues to feel pain in left abdominal wall. Controlled with oral pain medication.   GI fx +/+ in ostomy   Ambulating with assistance.     Objective:   Physical Exam:   Gen: NAD. Alert and cooperative  Resp: No addition work of breathing.   Card: RRR. No peripheral edema.   Abd: No masses. Soft, ND. Tender in left abdomen. BASIM drain in place with SS output, tender surrounding drain site. No erythema/induration.     T(C): 36.7 (01-10-20 @ 01:50), Max: 37.4 (01-09-20 @ 05:40)  HR: 85 (01-10-20 @ 01:50) (80 - 108)  BP: 108/68 (01-10-20 @ 01:50) (97/55 - 109/68)  RR: 18 (01-10-20 @ 01:50) (17 - 96)  SpO2: 98% (01-10-20 @ 01:50) (94% - 98%)  Wt(kg): --    LABS:                        9.0    15.36 )-----------( 256      ( 09 Jan 2020 07:01 )             27.8     01-09    135  |  99  |  19  ----------------------------<  135<H>  3.9   |  24  |  0.79    Ca    9.1      09 Jan 2020 07:01  Phos  3.1     01-09  Mg     1.7     01-09    TPro  7.0  /  Alb  3.0<L>  /  TBili  0.4  /  DBili  x   /  AST  14  /  ALT  7<L>  /  AlkPhos  73  01-08    PT/INR - ( 09 Jan 2020 07:07 )   PT: 19.9 sec;   INR: 1.70 ratio         PTT - ( 09 Jan 2020 07:07 )  PTT:30.8 sec  Urinalysis Basic - ( 08 Jan 2020 20:45 )    Color: Light Yellow / Appearance: Clear / SG: >1.050 / pH: x  Gluc: x / Ketone: Negative  / Bili: Negative / Urobili: Negative   Blood: x / Protein: Trace / Nitrite: Negative   Leuk Esterase: Large / RBC: 1 /hpf /  /HPF   Sq Epi: x / Non Sq Epi: 1 /hpf / Bacteria: Negative          CAPILLARY BLOOD GLUCOSE  POCT Blood Glucose.: 259 mg/dL (09 Jan 2020 22:32)  POCT Blood Glucose.: 121 mg/dL (09 Jan 2020 18:42)  POCT Blood Glucose.: 107 mg/dL (09 Jan 2020 13:40)  POCT Blood Glucose.: 149 mg/dL (09 Jan 2020 05:32)      Is&O's    01-08-20 @ 07:01  -  01-09-20 @ 07:00  --------------------------------------------------------  IN: 280 mL / OUT: 0 mL / NET: 280 mL    01-09-20 @ 07:01  -  01-10-20 @ 03:07  --------------------------------------------------------  IN: 670 mL / OUT: 275 mL / NET: 395 mL

## 2020-01-11 LAB
-  AMIKACIN: SIGNIFICANT CHANGE UP
-  AMOXICILLIN/CLAVULANIC ACID: SIGNIFICANT CHANGE UP
-  AMPICILLIN/SULBACTAM: SIGNIFICANT CHANGE UP
-  AMPICILLIN/SULBACTAM: SIGNIFICANT CHANGE UP
-  AMPICILLIN: SIGNIFICANT CHANGE UP
-  AMPICILLIN: SIGNIFICANT CHANGE UP
-  AZTREONAM: SIGNIFICANT CHANGE UP
-  CEFAZOLIN: SIGNIFICANT CHANGE UP
-  CEFAZOLIN: SIGNIFICANT CHANGE UP
-  CEFEPIME: SIGNIFICANT CHANGE UP
-  CEFOXITIN: SIGNIFICANT CHANGE UP
-  CEFTRIAXONE: SIGNIFICANT CHANGE UP
-  CIPROFLOXACIN: SIGNIFICANT CHANGE UP
-  CLINDAMYCIN: SIGNIFICANT CHANGE UP
-  DAPTOMYCIN: SIGNIFICANT CHANGE UP
-  ERTAPENEM: SIGNIFICANT CHANGE UP
-  ERYTHROMYCIN: SIGNIFICANT CHANGE UP
-  GENTAMICIN: SIGNIFICANT CHANGE UP
-  GENTAMICIN: SIGNIFICANT CHANGE UP
-  IMIPENEM: SIGNIFICANT CHANGE UP
-  LEVOFLOXACIN: SIGNIFICANT CHANGE UP
-  LINEZOLID: SIGNIFICANT CHANGE UP
-  MEROPENEM: SIGNIFICANT CHANGE UP
-  OXACILLIN: SIGNIFICANT CHANGE UP
-  PENICILLIN: SIGNIFICANT CHANGE UP
-  PIPERACILLIN/TAZOBACTAM: SIGNIFICANT CHANGE UP
-  RIFAMPIN: SIGNIFICANT CHANGE UP
-  TETRACYCLINE: SIGNIFICANT CHANGE UP
-  TETRACYCLINE: SIGNIFICANT CHANGE UP
-  TOBRAMYCIN: SIGNIFICANT CHANGE UP
-  TRIMETHOPRIM/SULFAMETHOXAZOLE: SIGNIFICANT CHANGE UP
-  TRIMETHOPRIM/SULFAMETHOXAZOLE: SIGNIFICANT CHANGE UP
-  VANCOMYCIN: SIGNIFICANT CHANGE UP
-  VANCOMYCIN: SIGNIFICANT CHANGE UP
GLUCOSE BLDC GLUCOMTR-MCNC: 115 MG/DL — HIGH (ref 70–99)
GLUCOSE BLDC GLUCOMTR-MCNC: 120 MG/DL — HIGH (ref 70–99)
GLUCOSE BLDC GLUCOMTR-MCNC: 138 MG/DL — HIGH (ref 70–99)
GLUCOSE BLDC GLUCOMTR-MCNC: 146 MG/DL — HIGH (ref 70–99)
HCT VFR BLD CALC: 26.9 % — LOW (ref 34.5–45)
HGB BLD-MCNC: 8.8 G/DL — LOW (ref 11.5–15.5)
MCHC RBC-ENTMCNC: 32.7 GM/DL — SIGNIFICANT CHANGE UP (ref 32–36)
MCHC RBC-ENTMCNC: 33.5 PG — SIGNIFICANT CHANGE UP (ref 27–34)
MCV RBC AUTO: 102.3 FL — HIGH (ref 80–100)
METHOD TYPE: SIGNIFICANT CHANGE UP
NRBC # BLD: 0 /100 WBCS — SIGNIFICANT CHANGE UP (ref 0–0)
PLATELET # BLD AUTO: 242 K/UL — SIGNIFICANT CHANGE UP (ref 150–400)
RBC # BLD: 2.63 M/UL — LOW (ref 3.8–5.2)
RBC # FLD: 12.9 % — SIGNIFICANT CHANGE UP (ref 10.3–14.5)
WBC # BLD: 7.81 K/UL — SIGNIFICANT CHANGE UP (ref 3.8–10.5)
WBC # FLD AUTO: 7.81 K/UL — SIGNIFICANT CHANGE UP (ref 3.8–10.5)

## 2020-01-11 RX ADMIN — Medication 650 MILLIGRAM(S): at 06:09

## 2020-01-11 RX ADMIN — Medication 325 MILLIGRAM(S): at 17:37

## 2020-01-11 RX ADMIN — MONTELUKAST 10 MILLIGRAM(S): 4 TABLET, CHEWABLE ORAL at 12:06

## 2020-01-11 RX ADMIN — Medication 500 MILLIGRAM(S): at 12:06

## 2020-01-11 RX ADMIN — BUDESONIDE AND FORMOTEROL FUMARATE DIHYDRATE 2 PUFF(S): 160; 4.5 AEROSOL RESPIRATORY (INHALATION) at 21:08

## 2020-01-11 RX ADMIN — Medication 10 MILLIGRAM(S): at 06:08

## 2020-01-11 RX ADMIN — Medication 250 MILLIGRAM(S): at 08:09

## 2020-01-11 RX ADMIN — Medication 1 MILLIGRAM(S): at 12:06

## 2020-01-11 RX ADMIN — ATORVASTATIN CALCIUM 20 MILLIGRAM(S): 80 TABLET, FILM COATED ORAL at 21:08

## 2020-01-11 RX ADMIN — LORATADINE 10 MILLIGRAM(S): 10 TABLET ORAL at 12:06

## 2020-01-11 RX ADMIN — Medication 650 MILLIGRAM(S): at 12:06

## 2020-01-11 RX ADMIN — Medication 250 MILLIGRAM(S): at 21:08

## 2020-01-11 RX ADMIN — Medication 650 MILLIGRAM(S): at 17:37

## 2020-01-11 RX ADMIN — Medication 40 MILLIGRAM(S): at 06:09

## 2020-01-11 RX ADMIN — Medication 650 MILLIGRAM(S): at 18:00

## 2020-01-11 RX ADMIN — Medication 650 MILLIGRAM(S): at 13:00

## 2020-01-11 RX ADMIN — ERTAPENEM SODIUM 120 MILLIGRAM(S): 1 INJECTION, POWDER, LYOPHILIZED, FOR SOLUTION INTRAMUSCULAR; INTRAVENOUS at 20:03

## 2020-01-11 RX ADMIN — PANTOPRAZOLE SODIUM 40 MILLIGRAM(S): 20 TABLET, DELAYED RELEASE ORAL at 06:08

## 2020-01-11 RX ADMIN — Medication 650 MILLIGRAM(S): at 06:39

## 2020-01-11 RX ADMIN — SPIRONOLACTONE 25 MILLIGRAM(S): 25 TABLET, FILM COATED ORAL at 06:08

## 2020-01-11 RX ADMIN — Medication 325 MILLIGRAM(S): at 06:08

## 2020-01-11 RX ADMIN — Medication 650 MILLIGRAM(S): at 00:30

## 2020-01-11 RX ADMIN — ENOXAPARIN SODIUM 40 MILLIGRAM(S): 100 INJECTION SUBCUTANEOUS at 12:05

## 2020-01-11 NOTE — CHART NOTE - NSCHARTNOTEFT_GEN_A_CORE
Event: Paged to patient's bedside for hypotension (88/60). Patient was asymptomatic and does not report any dizziness, light-headedness, chest pain, headache, or abdominal pain. Patient reports not drinking as much water for fear of having to use the bathroom more frequently. She was resting comfortably in the chair at the time of examination.    Vital Signs Last 24 Hrs  T(C): 36.6 (11 Jan 2020 17:30), Max: 36.9 (10 Noah 2020 21:26)  T(F): 97.9 (11 Jan 2020 17:30), Max: 98.5 (10 Noah 2020 21:26)  HR: 99 (11 Jan 2020 17:30) (72 - 99)  BP: 88/60 (11 Jan 2020 17:30) (88/60 - 112/68)  BP(mean): --  RR: 16 (11 Jan 2020 17:30) (16 - 18)  SpO2: 96% (11 Jan 2020 17:30) (96% - 98%)    Plan:   - Will continue to monitor  - Encouraged patient to drink more water  - No acute intervention at this time  - Patient discussed with PGY-2 Pepe Smallwood surgery,  [id number]

## 2020-01-11 NOTE — PROGRESS NOTE ADULT - SUBJECTIVE AND OBJECTIVE BOX
SURGERY PROGRESS NOTE    Subjective:   Tolerating diet  Continues to feel pain in left abdominal wall. Controlled with oral pain medication.   GI fx +/+ in ostomy (20mL/24hr)  Ambulating with assistance.     Objective:   Physical Exam:   Gen: NAD. Alert and cooperative  Resp: No addition work of breathing.   Card: RRR. No peripheral edema.   Abd: No masses. Soft, ND. BASIM drain in place with SS output (65mL/24hr), tender surrounding drain site. No erythema/induration.     T(C): 36.4 (01-11-20 @ 06:14), Max: 36.9 (01-10-20 @ 21:26)  HR: 72 (01-11-20 @ 06:14) (72 - 91)  BP: 110/69 (01-11-20 @ 06:14) (92/60 - 112/68)  RR: 18 (01-11-20 @ 06:14) (18 - 18)  SpO2: 97% (01-11-20 @ 06:14) (96% - 98%)  Wt(kg): --    LABS:                        8.6    10.58 )-----------( 237      ( 10 Noah 2020 07:26 )             27.4     01-09    135  |  99  |  19  ----------------------------<  135<H>  3.9   |  24  |  0.79    Ca    9.1      09 Jan 2020 07:01  Phos  3.1     01-09  Mg     1.7     01-09      PT/INR - ( 10 Noah 2020 07:28 )   PT: 14.8 sec;   INR: 1.29 ratio    PTT - ( 10 Noah 2020 07:28 )  PTT:29.4 sec        CAPILLARY BLOOD GLUCOSE  POCT Blood Glucose.: 142 mg/dL (10 Noah 2020 21:49)  POCT Blood Glucose.: 159 mg/dL (10 Noah 2020 16:41)  POCT Blood Glucose.: 129 mg/dL (10 Noah 2020 12:51)  POCT Blood Glucose.: 90 mg/dL (10 Noah 2020 08:37)      Is&O's    01-09-20 @ 07:01  -  01-10-20 @ 07:00  --------------------------------------------------------  IN: 850 mL / OUT: 275 mL / NET: 575 mL    01-10-20 @ 07:01 - 01-11-20 @ 06:23  --------------------------------------------------------  IN: 1050 mL / OUT: 85 mL / NET: 965 mL

## 2020-01-11 NOTE — PROGRESS NOTE ADULT - ASSESSMENT
73 yo female with perforated diverticulitis s/p Lupe in July 2019 complicated by ECF and multiple abscess requiring IR drainage with most recent one done in Dec 2019, c/b rectal stump leak and fistula S/P endoscopic Ovesco "Bear claw" clip placement in December , presented from rehab with left abdominal pain after the IR drainage catheter fell off 1 week ago, found to have reaccumulation of left abdominal wall abscess measuring 9 x 1.8 cm, now s/p IR drain placement 1/9.     PLAN:  - regular diet  - c/w abx (ertapenem/vancomycin per ID)  -  f/u IR cultures for sensitivities, prelim read: staph aureus, enterococcus feacalis, E. Coli  - d/c: MATT per PT eval    Green Surgery  [id number] 73 yo female with perforated diverticulitis s/p Lupe in July 2019 complicated by ECF and multiple abscess requiring IR drainage with most recent one done in Dec 2019, c/b rectal stump leak and fistula S/P endoscopic Ovesco "Bear claw" clip placement in December , presented from rehab with left abdominal pain after the IR drainage catheter fell off 1 week ago, found to have reaccumulation of left abdominal wall abscess measuring 9 x 1.8 cm, now s/p IR drain placement 1/9.     PLAN:  - regular diet  - c/w abx (ertapenem/vancomycin per ID)  -  f/u IR cultures for sensitivities, prelim read: staph aureus, enterococcus feacalis, E. Coli  - holding home Eliquis, restart when decision for PICC is made  - d/c: MATT per PT eval    Green Surgery  [id number] 73 yo female with perforated diverticulitis s/p Lupe in July 2019 complicated by ECF and multiple abscess requiring IR drainage with most recent one done in Dec 2019, c/b rectal stump leak and fistula S/P endoscopic Ovesco "Bear claw" clip placement in December , presented from rehab with left abdominal pain after the IR drainage catheter fell off 1 week ago, found to have reaccumulation of left abdominal wall abscess measuring 9 x 1.8 cm, now s/p IR drain placement 1/9.     PLAN:  - regular diet  - c/w abx (ertapenem/vancomycin per ID)  -  f/u IR cultures for sensitivities, prelim read: staph aureus, enterococcus feacalis, E. Coli  - holding home Eliquis, restart when decision for PICC is made  - d/c: MATT daniels PT eval    Green Surgery  [id number]    Addendum  Patient was seen/examined by the MIS/bariatric fellow. Agree with resident note.  Continue IV Abx - awaiting final culture results and ID input.  Eliquis currently held pending decision on PICC. Will resume after antibiotic course is decided.  Social work for dispo planning.  Danita Yo MD

## 2020-01-11 NOTE — PROVIDER CONTACT NOTE (CHANGE IN STATUS NOTIFICATION) - ACTION/TREATMENT ORDERED:
Continue to monitor. encourage to drink Hold Vasotec  and Lasix, encourage to drink. Continue to monitor.

## 2020-01-12 ENCOUNTER — TRANSCRIPTION ENCOUNTER (OUTPATIENT)
Age: 75
End: 2020-01-12

## 2020-01-12 LAB
GLUCOSE BLDC GLUCOMTR-MCNC: 109 MG/DL — HIGH (ref 70–99)
GLUCOSE BLDC GLUCOMTR-MCNC: 114 MG/DL — HIGH (ref 70–99)
GLUCOSE BLDC GLUCOMTR-MCNC: 122 MG/DL — HIGH (ref 70–99)
GLUCOSE BLDC GLUCOMTR-MCNC: 147 MG/DL — HIGH (ref 70–99)
HCT VFR BLD CALC: 29.2 % — LOW (ref 34.5–45)
HGB BLD-MCNC: 9.1 G/DL — LOW (ref 11.5–15.5)
MCHC RBC-ENTMCNC: 31.2 GM/DL — LOW (ref 32–36)
MCHC RBC-ENTMCNC: 32.7 PG — SIGNIFICANT CHANGE UP (ref 27–34)
MCV RBC AUTO: 105 FL — HIGH (ref 80–100)
NRBC # BLD: 0 /100 WBCS — SIGNIFICANT CHANGE UP (ref 0–0)
PLATELET # BLD AUTO: 231 K/UL — SIGNIFICANT CHANGE UP (ref 150–400)
RBC # BLD: 2.78 M/UL — LOW (ref 3.8–5.2)
RBC # FLD: 12.8 % — SIGNIFICANT CHANGE UP (ref 10.3–14.5)
VANCOMYCIN TROUGH SERPL-MCNC: 26.7 UG/ML — CRITICAL HIGH (ref 10–20)
WBC # BLD: 9.16 K/UL — SIGNIFICANT CHANGE UP (ref 3.8–10.5)
WBC # FLD AUTO: 9.16 K/UL — SIGNIFICANT CHANGE UP (ref 3.8–10.5)

## 2020-01-12 PROCEDURE — 99233 SBSQ HOSP IP/OBS HIGH 50: CPT

## 2020-01-12 RX ORDER — APIXABAN 2.5 MG/1
5 TABLET, FILM COATED ORAL EVERY 12 HOURS
Refills: 0 | Status: DISCONTINUED | OUTPATIENT
Start: 2020-01-12 | End: 2020-01-16

## 2020-01-12 RX ORDER — VANCOMYCIN HCL 1 G
1000 VIAL (EA) INTRAVENOUS EVERY 24 HOURS
Refills: 0 | Status: DISCONTINUED | OUTPATIENT
Start: 2020-01-12 | End: 2020-01-13

## 2020-01-12 RX ADMIN — Medication 650 MILLIGRAM(S): at 11:24

## 2020-01-12 RX ADMIN — Medication 500 MILLIGRAM(S): at 11:24

## 2020-01-12 RX ADMIN — APIXABAN 5 MILLIGRAM(S): 2.5 TABLET, FILM COATED ORAL at 17:33

## 2020-01-12 RX ADMIN — Medication 650 MILLIGRAM(S): at 17:35

## 2020-01-12 RX ADMIN — Medication 10 MILLIGRAM(S): at 17:32

## 2020-01-12 RX ADMIN — MONTELUKAST 10 MILLIGRAM(S): 4 TABLET, CHEWABLE ORAL at 11:24

## 2020-01-12 RX ADMIN — Medication 1 MILLIGRAM(S): at 11:24

## 2020-01-12 RX ADMIN — Medication 40 MILLIGRAM(S): at 11:24

## 2020-01-12 RX ADMIN — Medication 40 MILLIGRAM(S): at 17:33

## 2020-01-12 RX ADMIN — ENOXAPARIN SODIUM 40 MILLIGRAM(S): 100 INJECTION SUBCUTANEOUS at 11:23

## 2020-01-12 RX ADMIN — ATORVASTATIN CALCIUM 20 MILLIGRAM(S): 80 TABLET, FILM COATED ORAL at 22:07

## 2020-01-12 RX ADMIN — PANTOPRAZOLE SODIUM 40 MILLIGRAM(S): 20 TABLET, DELAYED RELEASE ORAL at 06:03

## 2020-01-12 RX ADMIN — Medication 10 MILLIGRAM(S): at 06:03

## 2020-01-12 RX ADMIN — LORATADINE 10 MILLIGRAM(S): 10 TABLET ORAL at 11:24

## 2020-01-12 RX ADMIN — ERTAPENEM SODIUM 120 MILLIGRAM(S): 1 INJECTION, POWDER, LYOPHILIZED, FOR SOLUTION INTRAMUSCULAR; INTRAVENOUS at 19:25

## 2020-01-12 RX ADMIN — Medication 325 MILLIGRAM(S): at 17:33

## 2020-01-12 RX ADMIN — Medication 650 MILLIGRAM(S): at 11:26

## 2020-01-12 RX ADMIN — BUDESONIDE AND FORMOTEROL FUMARATE DIHYDRATE 2 PUFF(S): 160; 4.5 AEROSOL RESPIRATORY (INHALATION) at 22:08

## 2020-01-12 RX ADMIN — SPIRONOLACTONE 25 MILLIGRAM(S): 25 TABLET, FILM COATED ORAL at 06:03

## 2020-01-12 RX ADMIN — Medication 325 MILLIGRAM(S): at 06:03

## 2020-01-12 RX ADMIN — Medication 250 MILLIGRAM(S): at 22:08

## 2020-01-12 RX ADMIN — Medication 650 MILLIGRAM(S): at 17:33

## 2020-01-12 NOTE — DISCHARGE NOTE PROVIDER - NSDCCPCAREPLAN_GEN_ALL_CORE_FT
PRINCIPAL DISCHARGE DIAGNOSIS  Diagnosis: Abscess  Assessment and Plan of Treatment: PRINCIPAL DISCHARGE DIAGNOSIS  Diagnosis: Abscess  Assessment and Plan of Treatment: WOUND CARE: You will be discharged with BASIM drains. You will need to empty them and record outputs accurately. This will be taught to you by the nursing staff. Please do not remove the BASIM drains. They will be removed in the office. Please bring to the office accurate records of output. Please flush with 5cc of normal saline daily.   BATHING: Please do not submerge drain underwater. You may shower and/or sponge bathe.  ACTIVITY: No heavy lifting anything more than 10-15lbs or straining. Otherwise, you may return to your usual level of physical activity. If you are taking narcotic pain medication (such as oxycodone or Percocet), do NOT drive a car, operate machinery or make important decisions.  NOTIFY YOUR SURGEON IF: You have any bleeding that does not stop, any pus draining from your wound, any fever (over 100.4 F) or chills, persistent nausea/vomiting with inability to tolerate food or liquids, persistent diarrhea, excessive drain output or change in quality of drian, or if your pain is not controlled on your discharge pain medications.  FOLLOW-UP:  1. Please call to make a follow-up appointment in 1-2 weeks upon discharge from the hospital with Dr. Castro  2. Please follow up with your primary care physician in one week regarding your hospitalization.

## 2020-01-12 NOTE — PROGRESS NOTE ADULT - ASSESSMENT
73 yo female with perforated diverticulitis s/p Lupe in July 2019 complicated by ECF and multiple abscess requiring IR drainage with most recent one done in Dec 2019, c/b rectal stump leak and fistula S/P endoscopic Ovesco "Bear claw" clip placement in December , presented from rehab with left abdominal pain after the IR drainage catheter fell off 1 week ago, found to have reaccumulation of left abdominal wall abscess measuring 9 x 1.8 cm, now s/p IR drain placement 1/9.     PLAN:  - regular diet  - c/w abx ertapenem/vancomycin  - f/u w/ ID for abx on d/c  - holding home Eliquis, restart when decision for PICC is made  - d/c: MATT tomorrow per PT eval    Green Surgery  [id number] 75 yo female with perforated diverticulitis s/p Lupe in July 2019 complicated by ECF and multiple abscess requiring IR drainage with most recent one done in Dec 2019, c/b rectal stump leak and fistula S/P endoscopic Ovesco "Bear claw" clip placement in December , presented from rehab with left abdominal pain after the IR drainage catheter fell off 1 week ago, found to have reaccumulation of left abdominal wall abscess measuring 9 x 1.8 cm, now s/p IR drain placement 1/9.     PLAN:  - regular diet  - c/w abx ertapenem/vancomycin  - f/u w/ ID for abx on d/c  - holding home Eliquis, restart when decision for PICC is made  - d/c: MATT tomorrow per PT eval    Green Surgery  [id number]    Addendum  Agree with resident note.  Patient is tolerating a diet and having bowel function.  Follow up with ID for antibiotic plan - sensitivities have resulted.  Will restart Eliquis after decision about IV vs PO abx is made.  PT/OT.  Dispo planning.  Danita Yo MD

## 2020-01-12 NOTE — DISCHARGE NOTE PROVIDER - CARE PROVIDER_API CALL
Ish Black)  ColonRectal Surgery; Surgery  [address], [address]  [place] [address]  Phone: [phone]  Fax: [phone]  Established Patient  Follow Up Time: Routine Tracy Jay)  ColonRectal Surgery; Surgery  [address], [address]  [place] [address]  Phone: [phone]  Fax: [phone]  Follow Up Time:     Rex Goode)  Internal Medicine  [address] [place] [address]  Phone: [phone]  Fax: [phone]  Follow Up Time:     Luis Miguel Walker)  Radiology  [address] [place] [address]  Phone: [phone]  Fax: [phone]  Follow Up Time:

## 2020-01-12 NOTE — DISCHARGE NOTE PROVIDER - HOSPITAL COURSE
75 yo female with history of DM2, HTN, COPD, Aflutter on Eliquis, HF with AICD ([employer], interrogated on 09/02/2019), and perforated diverticulitis requiring Lupe in July 2019 which complicated by enterocutaneous fistula and multiple abscesses requiring IR drainage (most recently in Dec 2019 on the left anterior abdominal wall). She presented on 1/8 from [place] due to abdominal pain after IR drain in left abdominal abscess cavity had fallen out.  IR placed a 10Fr drain on 1/9. ID was consulted and patient was placed on vancomycin and ertapenem while awaiting cultures.         Per ID patient is to be discharged with _____. She will follow up with IR for drain study and eventual removal and routinely with Dr. Black.        On the day of discharge patient is hemodynamically stable, tolerating a diet, having consistent ostomy function, with well controlled pain and she is ambulating at baseline. Patient is safe and satisfactory for discharge to subacute rehab. 75 yo female with history of DM2, HTN, COPD, Aflutter on Eliquis, HF with AICD ([employer], interrogated on 09/02/2019), and perforated diverticulitis requiring Lupe in July 2019 which complicated by enterocutaneous fistula and multiple abscesses requiring IR drainage (most recently in Dec 2019 on the left anterior abdominal wall). She presented on 1/8 from [place] due to abdominal pain after IR drain in left abdominal abscess cavity had fallen out.  IR placed a 10Fr drain on 1/9. ID was consulted and patient was placed on vancomycin and ertapenem while awaiting cultures.         Per ID patient is to be discharged with -Continue Kelex 500 mg PO Q8H (End Date: 1/17/20)    --Continue Flagyl 500 mg PO Q12H (End Date: 1/17/20)    --Continue Doxycycline 100 mg PO Q12H (End Date: 1/17/20)         She will follow up with IR for drain study and eventual removal and routinely with Dr. Castro.        On the day of discharge patient is hemodynamically stable, tolerating a diet, having consistent ostomy function, with well controlled pain and she is ambulating at baseline. Patient is safe and satisfactory for discharge to subacute rehab.

## 2020-01-12 NOTE — DISCHARGE NOTE PROVIDER - PROVIDER TOKENS
PROVIDER:[TOKEN:[[id number]],FOLLOWUP:[Routine],ESTABLISHEDPATIENT:[T]] PROVIDER:[TOKEN:[[id number]]],PROVIDER:[TOKEN:[[id number]]],PROVIDER:[TOKEN:[[id number]]]

## 2020-01-12 NOTE — DISCHARGE NOTE PROVIDER - NSDCFUADDINST_GEN_ALL_CORE_FT
When the output from the drainage catheter is 10 ml or less per 24 hour period for at least 1-2 days can consider f/u with IR for tube check.  Contact IR for tube check.  If pt is outpatient then she can contact IR scheduling office at [phone] to scheduling the f/u.

## 2020-01-12 NOTE — DISCHARGE NOTE PROVIDER - NSDCMRMEDTOKEN_GEN_ALL_CORE_FT
apixaban 5 mg oral tablet: 1 tab(s) orally every 12 hours  ascorbic acid 500 mg oral tablet: 1 tab(s) orally once a day  Aspir 81 oral delayed release tablet: 1 tab(s) orally once a day  atorvastatin 20 mg oral tablet: 1 tab(s) orally once a day (at bedtime)  cetirizine 10 mg oral tablet: 1 tab(s) orally once a day  enalapril 10 mg oral tablet: 1 tab(s) orally 2 times a day  ferrous sulfate 325 mg (65 mg elemental iron) oral tablet: 1 tab(s) orally 2 times a day  folic acid 1 mg oral tablet: 1 tab(s) orally once a day  furosemide 40 mg oral tablet: 1  orally 2 times a day  guaiFENesin 100 mg/5 mL oral liquid: 5 milliliter(s) orally every 6 hours, As needed, Cough  magnesium oxide 500 mg oral tablet: 1 tab(s) orally 3 times a day max daily dose 3 tablets   montelukast 10 mg oral tablet: 1 tab(s) orally once a day  omeprazole 40 mg oral delayed release capsule: 1 cap(s) orally once a day  potassium chloride 20 mEq/15 mL oral liquid: 15 milliliter(s) orally once a day only as needed   repaglinide 0.5 mg oral tablet: 1 tab(s) orally 3 times a day (before meals)  spironolactone 25 mg oral tablet: 1 tab(s) orally once a day  Symbicort 160 mcg-4.5 mcg/inh inhalation aerosol: 2 puff(s) inhaled once a day (at bedtime)  Ventolin HFA 90 mcg/inh inhalation aerosol: 1 puff(s) inhaled every 4 hours-6 hours as needed apixaban 5 mg oral tablet: 1 tab(s) orally every 12 hours  ascorbic acid 500 mg oral tablet: 1 tab(s) orally once a day  Aspir 81 oral delayed release tablet: 1 tab(s) orally once a day  atorvastatin 20 mg oral tablet: 1 tab(s) orally once a day (at bedtime)  cetirizine 10 mg oral tablet: 1 tab(s) orally once a day  doxycycline monohydrate 100 mg oral capsule: 1 cap(s) orally every 12 hours till 1/17  enalapril 10 mg oral tablet: 1 tab(s) orally 2 times a day  ferrous sulfate 325 mg (65 mg elemental iron) oral tablet: 1 tab(s) orally 2 times a day  folic acid 1 mg oral tablet: 1 tab(s) orally once a day  furosemide 40 mg oral tablet: 1  orally 2 times a day  Keflex 500 mg oral capsule: 1 cap(s) orally every 8 hours till 1/17  magnesium oxide 500 mg oral tablet: 1 tab(s) orally 3 times a day max daily dose 3 tablets   metroNIDAZOLE 500 mg oral tablet: 1 tab(s) orally every 12 hours till 1/17  montelukast 10 mg oral tablet: 1 tab(s) orally once a day  omeprazole 40 mg oral delayed release capsule: 1 cap(s) orally once a day  potassium chloride 20 mEq/15 mL oral liquid: 15 milliliter(s) orally once a day only as needed   repaglinide 0.5 mg oral tablet: 1 tab(s) orally 3 times a day (before meals)  spironolactone 25 mg oral tablet: 1 tab(s) orally once a day  Symbicort 160 mcg-4.5 mcg/inh inhalation aerosol: 2 puff(s) inhaled once a day (at bedtime)  Ventolin HFA 90 mcg/inh inhalation aerosol: 1 puff(s) inhaled every 4 hours-6 hours as needed

## 2020-01-12 NOTE — DISCHARGE NOTE PROVIDER - CARE PROVIDERS DIRECT ADDRESSES
[email or web address]

## 2020-01-12 NOTE — PROGRESS NOTE ADULT - SUBJECTIVE AND OBJECTIVE BOX
SURGERY PROGRESS NOTE    Subjective:   Tolerating diet w/o nausea/ vomiting  Continues to feel pain in left abdominal wall. Controlled with oral pain medication.   GI fx +/+ in ostomy (mL/24hr)  OOB to chair w/o assistance    Objective:   Physical Exam:   Gen: NAD. Alert and cooperative  Resp: No addition work of breathing.   Card: RRR. No peripheral edema.   Abd: No masses. Soft, ND. BASIM drain in place with SS output (25mL/24hr), tender surrounding drain site. No erythema/induration.     T(C): 36.8 (01-12-20 @ 05:23), Max: 36.9 (01-12-20 @ 01:02)  HR: 93 (01-12-20 @ 05:23) (89 - 99)  BP: 94/59 (01-12-20 @ 05:23) (88/60 - 105/68)  RR: 18 (01-12-20 @ 05:23) (16 - 18)  SpO2: 95% (01-12-20 @ 05:23) (95% - 98%)      LABS:                 8.8    7.81  )-----------( 242      ( 11 Jan 2020 07:17 )             26.9           PT/INR - ( 10 Noah 2020 07:28 )   PT: 14.8 sec;   INR: 1.29 ratio         PTT - ( 10 Noah 2020 07:28 )  PTT:29.4 sec        CAPILLARY BLOOD GLUCOSE      POCT Blood Glucose.: 146 mg/dL (11 Jan 2020 20:58)  POCT Blood Glucose.: 120 mg/dL (11 Jan 2020 18:05)  POCT Blood Glucose.: 138 mg/dL (11 Jan 2020 11:33)  POCT Blood Glucose.: 115 mg/dL (11 Jan 2020 09:20)      Is&O's    01-10-20 @ 07:01  -  01-11-20 @ 07:00  --------------------------------------------------------  IN: 1050 mL / OUT: 85 mL / NET: 965 mL    01-11-20 @ 07:01  -  01-12-20 @ 06:36  --------------------------------------------------------  IN: 1510 mL / OUT: 25 mL / NET: 1485 mL SURGERY PROGRESS NOTE    Subjective:   Tolerating diet w/o nausea/ vomiting  Continues to feel pain in left abdominal wall. Controlled with oral pain medication.   GI fx +/+ in ostomy (250mL/24hr)  OOB to chair w/o assistance    Objective:   Physical Exam:   Gen: NAD. Alert and cooperative  Resp: No addition work of breathing.   Card: RRR. No peripheral edema.   Abd: No masses. Soft, ND. BASIM drain in place with SS output (25mL/24hr), tender surrounding drain site. No erythema/induration. Midline incision well healing with area of granulation tissue in inferior portion, prior site of fistula to rectal stump, with scant purulent drainage, likely tracking from abdominal wall collection. Dressing in place.     T(C): 36.8 (01-12-20 @ 05:23), Max: 36.9 (01-12-20 @ 01:02)  HR: 93 (01-12-20 @ 05:23) (89 - 99)  BP: 94/59 (01-12-20 @ 05:23) (88/60 - 105/68)  RR: 18 (01-12-20 @ 05:23) (16 - 18)  SpO2: 95% (01-12-20 @ 05:23) (95% - 98%)      LABS:                 8.8    7.81  )-----------( 242      ( 11 Jan 2020 07:17 )             26.9           PT/INR - ( 10 Noah 2020 07:28 )   PT: 14.8 sec;   INR: 1.29 ratio         PTT - ( 10 Noah 2020 07:28 )  PTT:29.4 sec        CAPILLARY BLOOD GLUCOSE      POCT Blood Glucose.: 146 mg/dL (11 Jan 2020 20:58)  POCT Blood Glucose.: 120 mg/dL (11 Jan 2020 18:05)  POCT Blood Glucose.: 138 mg/dL (11 Jan 2020 11:33)  POCT Blood Glucose.: 115 mg/dL (11 Jan 2020 09:20)      Is&O's    01-10-20 @ 07:01  -  01-11-20 @ 07:00  --------------------------------------------------------  IN: 1050 mL / OUT: 85 mL / NET: 965 mL    01-11-20 @ 07:01  -  01-12-20 @ 06:36  --------------------------------------------------------  IN: 1510 mL / OUT: 25 mL / NET: 1485 mL

## 2020-01-12 NOTE — PROGRESS NOTE ADULT - SUBJECTIVE AND OBJECTIVE BOX
Follow Up:  abdominal wall abscess    Interval History: afebrile overnight. slight improvement in abdominal pain    REVIEW OF SYSTEMS  [  ] ROS unobtainable because:    [x  ] All other systems negative except as noted below    Constitutional:  [ ] fever [ ] chills  [ ] weight loss  [ ] weakness  Skin:  [ ] rash [ ] phlebitis	  Eyes: [ ] icterus [ ] pain  [ ] discharge	  ENMT: [ ] sore throat  [ ] thrush [ ] ulcers [ ] exudates  Respiratory: [ ] dyspnea [ ] hemoptysis [ ] cough [ ] sputum	  Cardiovascular:  [ ] chest pain [ ] palpitations [ ] edema	  Gastrointestinal:  [ ] nausea [ ] vomiting [ ] diarrhea [ ] constipation [ x] pain	  Genitourinary:  [ ] dysuria [ ] frequency [ ] hematuria [ ] discharge [ ] flank pain  [ ] incontinence  Musculoskeletal:  [ ] myalgias [ ] arthralgias [ ] arthritis  [ ] back pain  Neurological:  [ ] headache [ ] seizures  [ ] confusion/altered mental status    Allergies  Coreg (Other)  digoxin (Other; Short breath (Mild to Mod))  penicillins (Hives)        ANTIMICROBIALS:  ertapenem  IVPB    ertapenem  IVPB 1000 every 24 hours  vancomycin  IVPB 1000 every 24 hours      OTHER MEDS:  MEDICATIONS  (STANDING):  acetaminophen   Tablet .. 650 every 6 hours  ALBUTerol    90 MICROgram(s) HFA Inhaler 1 every 4 hours PRN  apixaban 5 every 12 hours  atorvastatin 20 at bedtime  budesonide 160 MICROgram(s)/formoterol 4.5 MICROgram(s) Inhaler 2 at bedtime  dextrose 40% Gel 15 once PRN  dextrose 50% Injectable 12.5 once  dextrose 50% Injectable 25 once  dextrose 50% Injectable 25 once  enalapril 10 two times a day  furosemide    Tablet 40 two times a day  glucagon  Injectable 1 once PRN  insulin lispro (HumaLOG) corrective regimen sliding scale  three times a day before meals  insulin lispro (HumaLOG) corrective regimen sliding scale  at bedtime  loratadine 10 daily  montelukast 10 daily  pantoprazole    Tablet 40 before breakfast  spironolactone 25 daily      Vital Signs Last 24 Hrs  T(C): 36.8 (12 Jan 2020 17:13), Max: 37.1 (12 Jan 2020 09:00)  T(F): 98.2 (12 Jan 2020 17:13), Max: 98.8 (12 Jan 2020 09:00)  HR: 80 (12 Jan 2020 17:13) (80 - 97)  BP: 90/60 (12 Jan 2020 17:13) (90/60 - 105/68)  BP(mean): --  RR: 18 (12 Jan 2020 17:13) (16 - 18)  SpO2: 96% (12 Jan 2020 17:13) (94% - 97%)    PHYSICAL EXAMINATION:  General: Alert and Awake, NAD  HEENT: PERRL, EOMI  Neck: Supple  Cardiac: RRR, No M/R/G  Resp: CTAB, No Wh/Rh/Ra  Abdomen: LLQ drain in abdomen with bloody/purulent drainage, midline abdominal wound/graft site with some drainage on dressing, NBS, severe tenderness to palpation over the LLQ of the abdomen. NBS, NT/ND, No HSM, No rigidity or guarding  MSK: R medial thigh graft harvest site healing well (No surrounding erythema, drainage or tenderness to palpation) No LE edema. No stigmata of IE. No evidence of phlebitis. No evidence of synovitis.  : No starr  Skin: Skin is warm and dry to the touch.   Neuro: Alert and Awake. CN 2-12 Grossly intact. Moves all four extremities spontaneously.  Psych: Calm, Pleasant, Cooperative                            9.1    9.16  )-----------( 231      ( 12 Jan 2020 06:49 )             29.2                   MICROBIOLOGY:  Vancomycin Level, Trough: 26.7 ug/mL (01-12-20 @ 06:53)  v  Abdominal Fl Abdominal Fluid  01-09-20   Few Methicillin resistant Staphylococcus aureus  Moderate Enterococcus faecalis  Numerous Escherichia coli  --  Methicillin resistant Staphylococcus aureus  Enterococcus faecalis  Escherichia coli      .Blood Blood  01-08-20   No growth to date.  --  --      .Blood Blood  01-08-20   No growth to date.  --  --      RADIOLOGY:    <The imaging below has been reviewed and visualized me independently>    EXAM:  CT ABDOMEN AND PELVIS IC                 PROCEDURE DATE:  01/08/2020    A new 7.3 x 1.8 cm left lateral abdominal collection. Underwent IR guided drainage of the collection on 1/9.

## 2020-01-13 LAB
ANION GAP SERPL CALC-SCNC: 14 MMOL/L — SIGNIFICANT CHANGE UP (ref 5–17)
BUN SERPL-MCNC: 13 MG/DL — SIGNIFICANT CHANGE UP (ref 7–23)
CALCIUM SERPL-MCNC: 9.1 MG/DL — SIGNIFICANT CHANGE UP (ref 8.4–10.5)
CHLORIDE SERPL-SCNC: 99 MMOL/L — SIGNIFICANT CHANGE UP (ref 96–108)
CO2 SERPL-SCNC: 26 MMOL/L — SIGNIFICANT CHANGE UP (ref 22–31)
CREAT SERPL-MCNC: 0.73 MG/DL — SIGNIFICANT CHANGE UP (ref 0.5–1.3)
CULTURE RESULTS: SIGNIFICANT CHANGE UP
CULTURE RESULTS: SIGNIFICANT CHANGE UP
GLUCOSE BLDC GLUCOMTR-MCNC: 104 MG/DL — HIGH (ref 70–99)
GLUCOSE BLDC GLUCOMTR-MCNC: 126 MG/DL — HIGH (ref 70–99)
GLUCOSE BLDC GLUCOMTR-MCNC: 134 MG/DL — HIGH (ref 70–99)
GLUCOSE BLDC GLUCOMTR-MCNC: 157 MG/DL — HIGH (ref 70–99)
GLUCOSE SERPL-MCNC: 102 MG/DL — HIGH (ref 70–99)
HCT VFR BLD CALC: 27.3 % — LOW (ref 34.5–45)
HGB BLD-MCNC: 8.8 G/DL — LOW (ref 11.5–15.5)
MAGNESIUM SERPL-MCNC: 1.4 MG/DL — LOW (ref 1.6–2.6)
MCHC RBC-ENTMCNC: 32.2 GM/DL — SIGNIFICANT CHANGE UP (ref 32–36)
MCHC RBC-ENTMCNC: 32.8 PG — SIGNIFICANT CHANGE UP (ref 27–34)
MCV RBC AUTO: 101.9 FL — HIGH (ref 80–100)
NRBC # BLD: 0 /100 WBCS — SIGNIFICANT CHANGE UP (ref 0–0)
PLATELET # BLD AUTO: 275 K/UL — SIGNIFICANT CHANGE UP (ref 150–400)
POTASSIUM SERPL-MCNC: 3.2 MMOL/L — LOW (ref 3.5–5.3)
POTASSIUM SERPL-SCNC: 3.2 MMOL/L — LOW (ref 3.5–5.3)
RBC # BLD: 2.68 M/UL — LOW (ref 3.8–5.2)
RBC # FLD: 12.9 % — SIGNIFICANT CHANGE UP (ref 10.3–14.5)
SODIUM SERPL-SCNC: 139 MMOL/L — SIGNIFICANT CHANGE UP (ref 135–145)
SPECIMEN SOURCE: SIGNIFICANT CHANGE UP
SPECIMEN SOURCE: SIGNIFICANT CHANGE UP
WBC # BLD: 9.71 K/UL — SIGNIFICANT CHANGE UP (ref 3.8–10.5)
WBC # FLD AUTO: 9.71 K/UL — SIGNIFICANT CHANGE UP (ref 3.8–10.5)

## 2020-01-13 PROCEDURE — 99231 SBSQ HOSP IP/OBS SF/LOW 25: CPT

## 2020-01-13 PROCEDURE — 99232 SBSQ HOSP IP/OBS MODERATE 35: CPT

## 2020-01-13 RX ORDER — MAGNESIUM SULFATE 500 MG/ML
2 VIAL (ML) INJECTION EVERY 4 HOURS
Refills: 0 | Status: COMPLETED | OUTPATIENT
Start: 2020-01-13 | End: 2020-01-13

## 2020-01-13 RX ORDER — METRONIDAZOLE 500 MG
500 TABLET ORAL EVERY 12 HOURS
Refills: 0 | Status: DISCONTINUED | OUTPATIENT
Start: 2020-01-13 | End: 2020-01-16

## 2020-01-13 RX ORDER — POTASSIUM CHLORIDE 20 MEQ
40 PACKET (EA) ORAL
Refills: 0 | Status: COMPLETED | OUTPATIENT
Start: 2020-01-13 | End: 2020-01-13

## 2020-01-13 RX ORDER — CEPHALEXIN 500 MG
500 CAPSULE ORAL EVERY 8 HOURS
Refills: 0 | Status: DISCONTINUED | OUTPATIENT
Start: 2020-01-13 | End: 2020-01-16

## 2020-01-13 RX ADMIN — Medication 40 MILLIEQUIVALENT(S): at 09:29

## 2020-01-13 RX ADMIN — MONTELUKAST 10 MILLIGRAM(S): 4 TABLET, CHEWABLE ORAL at 11:47

## 2020-01-13 RX ADMIN — PANTOPRAZOLE SODIUM 40 MILLIGRAM(S): 20 TABLET, DELAYED RELEASE ORAL at 06:09

## 2020-01-13 RX ADMIN — APIXABAN 5 MILLIGRAM(S): 2.5 TABLET, FILM COATED ORAL at 18:19

## 2020-01-13 RX ADMIN — Medication 500 MILLIGRAM(S): at 22:45

## 2020-01-13 RX ADMIN — Medication 40 MILLIGRAM(S): at 06:09

## 2020-01-13 RX ADMIN — Medication 325 MILLIGRAM(S): at 18:19

## 2020-01-13 RX ADMIN — Medication 325 MILLIGRAM(S): at 06:13

## 2020-01-13 RX ADMIN — LORATADINE 10 MILLIGRAM(S): 10 TABLET ORAL at 11:46

## 2020-01-13 RX ADMIN — ATORVASTATIN CALCIUM 20 MILLIGRAM(S): 80 TABLET, FILM COATED ORAL at 22:46

## 2020-01-13 RX ADMIN — Medication 100 MILLIGRAM(S): at 18:19

## 2020-01-13 RX ADMIN — Medication 50 GRAM(S): at 09:29

## 2020-01-13 RX ADMIN — Medication 500 MILLIGRAM(S): at 18:19

## 2020-01-13 RX ADMIN — Medication 40 MILLIEQUIVALENT(S): at 11:46

## 2020-01-13 RX ADMIN — Medication 500 MILLIGRAM(S): at 11:47

## 2020-01-13 RX ADMIN — BUDESONIDE AND FORMOTEROL FUMARATE DIHYDRATE 2 PUFF(S): 160; 4.5 AEROSOL RESPIRATORY (INHALATION) at 22:46

## 2020-01-13 RX ADMIN — APIXABAN 5 MILLIGRAM(S): 2.5 TABLET, FILM COATED ORAL at 06:09

## 2020-01-13 RX ADMIN — SPIRONOLACTONE 25 MILLIGRAM(S): 25 TABLET, FILM COATED ORAL at 06:09

## 2020-01-13 RX ADMIN — Medication 1 MILLIGRAM(S): at 11:47

## 2020-01-13 NOTE — PROGRESS NOTE ADULT - SUBJECTIVE AND OBJECTIVE BOX
Interventional Radiology    S/P left side abdominal drainage catheter, POD # 4.  Pt admits to left side abdominal pain.  She denies nausea or vomiting.       Vital Signs Last 24 Hrs  T(C): 36.6 (13 Jan 2020 09:10), Max: 37 (13 Jan 2020 01:16)  T(F): 97.9 (13 Jan 2020 09:10), Max: 98.6 (13 Jan 2020 01:16)  HR: 98 (13 Jan 2020 09:10) (80 - 98)  BP: 97/65 (13 Jan 2020 09:10) (88/56 - 100/68)  BP(mean): --  RR: 18 (13 Jan 2020 09:10) (17 - 18)  SpO2: 95% (13 Jan 2020 09:10) (95% - 100%)    General Appearance: NAD, seen at bedside.    Procedure Site (left side abdomen): drainage catheter is intact draining brown fluid to BASIM bulb.  catheter was flushed without difficulty and return fluid seen flowing back.  d/w covering RN to change the dressing.    I&O's Detail    OUT:    Bulb: 1/13/20 7AM 20 mL, 1/12/20 7AM 25 ml        LABS:                      8.8    9.71  )-----------( 275      ( 13 Jan 2020 08:28 )             27.3     01-13    139  |  99  |  13  ----------------------------<  102<H>  3.2<L>   |  26  |  0.73    Ca    9.1      13 Jan 2020 08:28  Mg     1.4     01-13    A/P  74y Female with h/o perforated diverticulitis s/p Lupe in July 2019 complicated by ECF and multiple abscess requiring IR drainage with most recent one done in Dec 2019 that became dislodged with reaccumulation of the left abdominal fluid collection now s/p percutaneous drainage by IR on 1/9/20    Continue global medical management as per primary team.    Forward flush the drainage catheter as ordered.  Maintain dressing around the site.  Monitor and record daily drainage outputs.      When the output from the drainage catheter is 10 ml or less per 24 hour period for at least 1-2 days can consider f/u with IR for tube check.  Contact IR for tube check.  If pt is outpatient then she can contact IR scheduling office at [phone] to scheduling the f/u.  The pt was given IR contact information business card.  Pt will benefit from VNS services to help take care of the catheter if she is D/C home.  If she goes to rehab please give care instructions for the catheter as described above.      Doug Lorenzana, DAMON-C  [facility] [address]  Ext [phone]

## 2020-01-13 NOTE — PROGRESS NOTE ADULT - SUBJECTIVE AND OBJECTIVE BOX
SURGERY PROGRESS NOTE    Subjective:   Tolerating diet w/o nausea/ vomiting  Continues to feel pain in left abdominal wall. Controlled with oral pain medication.   GI fx +/+ in ostomy (400mL/24hr)  OOB to chair w/o assistance    Objective:   Physical Exam:   Gen: NAD. Alert and cooperative  Resp: No addition work of breathing.   Card: RRR. No peripheral edema.   Abd: No masses. Soft, ND, tender surrounding drain site. No erythema/induration. Midline incision well healing with area of granulation tissue in inferior portion, prior site of fistula to rectal stump, with scant purulent drainage, likely tracking from abdominal wall collection. Dressing in place.   Drain; BASIM drain in place with SS output (15mL/24hr)    V/S:  Vital Signs Last 24 Hrs  T(C): 37 (13 Jan 2020 01:16), Max: 37.1 (12 Jan 2020 09:00)  T(F): 98.6 (13 Jan 2020 01:16), Max: 98.8 (12 Jan 2020 09:00)  HR: 86 (13 Jan 2020 01:16) (80 - 94)  BP: 88/56 (13 Jan 2020 01:16) (88/56 - 100/61)  BP(mean): --  RR: 18 (13 Jan 2020 01:16) (16 - 18)  SpO2: 97% (13 Jan 2020 01:16) (94% - 97%)    --------------------------------------------------------------------------------------------------  I/Os:    11 Jan 2020 07:01  -  12 Jan 2020 07:00  --------------------------------------------------------  IN:    IV PiggyBack: 550 mL    Oral Fluid: 960 mL  Total IN: 1510 mL    OUT:    Bulb: 25 mL    Colostomy: 250 mL  Total OUT: 275 mL    Total NET: 1235 mL      12 Jan 2020 07:01  -  13 Jan 2020 05:26  --------------------------------------------------------  IN:    IV PiggyBack: 50 mL    Oral Fluid: 920 mL  Total IN: 970 mL    OUT:    Bulb: 15 mL    Colostomy: 400 mL    Voided: 300 mL  Total OUT: 715 mL    Total NET: 255 mL        --------------------------------------------------------------------------------------------------  LABS:                        9.1    9.16  )-----------( 231      ( 12 Jan 2020 06:49 )             29.2             CAPILLARY BLOOD GLUCOSE      POCT Blood Glucose.: 147 mg/dL (12 Jan 2020 22:09)  POCT Blood Glucose.: 109 mg/dL (12 Jan 2020 17:05)  POCT Blood Glucose.: 122 mg/dL (12 Jan 2020 12:04)  POCT Blood Glucose.: 114 mg/dL (12 Jan 2020 08:47)              --------------------------------------------------------------------------------------------------  MEDICATIONS  (STANDING):  acetaminophen   Tablet .. 650 milliGRAM(s) Oral every 6 hours  apixaban 5 milliGRAM(s) Oral every 12 hours  ascorbic acid 500 milliGRAM(s) Oral daily  atorvastatin 20 milliGRAM(s) Oral at bedtime  budesonide 160 MICROgram(s)/formoterol 4.5 MICROgram(s) Inhaler 2 Puff(s) Inhalation at bedtime  dextrose 5%. 1000 milliLiter(s) (50 mL/Hr) IV Continuous <Continuous>  dextrose 50% Injectable 12.5 Gram(s) IV Push once  dextrose 50% Injectable 25 Gram(s) IV Push once  dextrose 50% Injectable 25 Gram(s) IV Push once  enalapril 10 milliGRAM(s) Oral two times a day  ertapenem  IVPB      ertapenem  IVPB 1000 milliGRAM(s) IV Intermittent every 24 hours  ferrous    sulfate 325 milliGRAM(s) Oral two times a day  folic acid 1 milliGRAM(s) Oral daily  furosemide    Tablet 40 milliGRAM(s) Oral two times a day  insulin lispro (HumaLOG) corrective regimen sliding scale   SubCutaneous three times a day before meals  insulin lispro (HumaLOG) corrective regimen sliding scale   SubCutaneous at bedtime  loratadine 10 milliGRAM(s) Oral daily  montelukast 10 milliGRAM(s) Oral daily  pantoprazole    Tablet 40 milliGRAM(s) Oral before breakfast  spironolactone 25 milliGRAM(s) Oral daily  vancomycin  IVPB 1000 milliGRAM(s) IV Intermittent every 24 hours    MEDICATIONS  (PRN):  ALBUTerol    90 MICROgram(s) HFA Inhaler 1 Puff(s) Inhalation every 4 hours PRN Wheezing  dextrose 40% Gel 15 Gram(s) Oral once PRN Blood Glucose LESS THAN 70 milliGRAM(s)/deciliter  glucagon  Injectable 1 milliGRAM(s) IntraMuscular once PRN Glucose LESS THAN 70 milligrams/deciliter

## 2020-01-13 NOTE — PROGRESS NOTE ADULT - ASSESSMENT
73 yo female with perforated diverticulitis s/p Lupe in July 2019 complicated by ECF and multiple abscess requiring IR drainage with most recent one done in Dec 2019, c/b rectal stump leak and fistula S/P endoscopic Ovesco "Bear claw" clip placement in December , presented from rehab with left abdominal pain after the IR drainage catheter fell off 1 week ago, found to have reaccumulation of left abdominal wall abscess measuring 9 x 1.8 cm, now s/p IR drain placement 1/9.     PLAN:  - regular diet  - c/w abx ertapenem/vancomycin  - f/u w/ ID for abx on d/c  - holding home Eliquis, restart when decision for PICC is made  - d/c: [facility]ATT tomorrow per PT eval    [facility]  [id number] 73 yo female with perforated diverticulitis s/p Lupe in July 2019 complicated by ECF and multiple abscess requiring IR drainage with most recent one done in Dec 2019, c/b rectal stump leak and fistula S/P endoscopic Ovesco "Bear claw" clip placement in December , presented from rehab with left abdominal pain after the IR drainage catheter fell off 1 week ago, found to have reaccumulation of left abdominal wall abscess measuring 9 x 1.8 cm, now s/p IR drain placement 1/9.     PLAN:  - regular diet  - c/w abx ertapenem/vancomycin  - holding home Eliquis, restart when decision for PICC is made  - d/c: [facility] per PT eval    [facility]  [id number]

## 2020-01-13 NOTE — PROGRESS NOTE ADULT - ASSESSMENT
73 yo female with history of DM2, HTN, COPD, Aflutter on Eliquis and HF with AICD ([employer], interrogated on 09/02/2019), and perforated diverticulitis requiring Lupe in July 2019 which complicated by enterocutaneous fistula and multiple abscesses requiring IR drainage (most recently in Dec 2019 on the left anterior abdominal wall) came in with abdominal pain for 1 day.     U/A with > 124 WBC (culture not sent but patient asymptomatic)  CT A/P (1/8) with A new 7.3 x 1.8 cm left lateral abdominal collection.  Underwent IR guided drainage of collection on 1/9  Abscess culture MRSA, Enterococcus, E coli    Would switch to PO today: Keflex/Flagyl/Doxycycline  PCN allergic but tolerated ceftriaxone previously  Would treat for 4 more days (to complete 7 days of targeted coverage for GI pathogens)  PO regimen would not cover enterococcus but I believe she would have had sufficient coverage with Vancomycin    Overall, MRSA infection, Abdominal Wall Collection, Leukocytosis    --Recommend contact isolation  --Recommend stopping Vancomycin and Ertapenem  --Recommend Keflex 500 mg PO Q8H (4 more days)  --Recommend Flagyl 500 mg PO Q12H (4 more days)  --Recommend Doxycycline 100 mg PO Q12H (4 more days)  --Consider repeat CT A/P (with PO and Rectal Contrast - to see if there is persistent fistula - patient had colonoscopy intervention with reported resolution/sealing of fistula)  --Continue to follow CBC with diff  --Continue to follow temperature curve    I will continue to follow. Please feel free to contact me with any further questions.    Rex Goode M.D.  [facility] Division of Infectious Disease  8AM-5PM: Pager Number [phone]  After Hours (or if no response): Please contact the Infectious Diseases Office at [phone]

## 2020-01-13 NOTE — PROGRESS NOTE ADULT - SUBJECTIVE AND OBJECTIVE BOX
Follow Up:  abdominal wall abscess    Interval History: abdominal pain continues to improve. lost IV access. no fevers    REVIEW OF SYSTEMS  [  ] ROS unobtainable because:    [ x ] All other systems negative except as noted below    Constitutional:  [ ] fever [ ] chills  [ ] weight loss  [ ] weakness  Skin:  [ ] rash [ ] phlebitis	  Eyes: [ ] icterus [ ] pain  [ ] discharge	  ENMT: [ ] sore throat  [ ] thrush [ ] ulcers [ ] exudates  Respiratory: [ ] dyspnea [ ] hemoptysis [ ] cough [ ] sputum	  Cardiovascular:  [ ] chest pain [ ] palpitations [ ] edema	  Gastrointestinal:  [ ] nausea [ ] vomiting [ ] diarrhea [ ] constipation [ x] pain	  Genitourinary:  [ ] dysuria [ ] frequency [ ] hematuria [ ] discharge [ ] flank pain  [ ] incontinence  Musculoskeletal:  [ ] myalgias [ ] arthralgias [ ] arthritis  [ ] back pain  Neurological:  [ ] headache [ ] seizures  [ ] confusion/altered mental status    Allergies  Coreg (Other)  digoxin (Other; Short breath (Mild to Mod))  penicillins (Hives)        ANTIMICROBIALS:  cephalexin 500 every 8 hours  doxycycline hyclate Capsule 100 every 12 hours  metroNIDAZOLE    Tablet 500 every 12 hours      OTHER MEDS:  MEDICATIONS  (STANDING):  ALBUTerol    90 MICROgram(s) HFA Inhaler 1 every 4 hours PRN  apixaban 5 every 12 hours  atorvastatin 20 at bedtime  budesonide 160 MICROgram(s)/formoterol 4.5 MICROgram(s) Inhaler 2 at bedtime  dextrose 40% Gel 15 once PRN  dextrose 50% Injectable 12.5 once  dextrose 50% Injectable 25 once  dextrose 50% Injectable 25 once  enalapril 10 two times a day  furosemide    Tablet 40 two times a day  glucagon  Injectable 1 once PRN  insulin lispro (HumaLOG) corrective regimen sliding scale  three times a day before meals  insulin lispro (HumaLOG) corrective regimen sliding scale  at bedtime  loratadine 10 daily  montelukast 10 daily  pantoprazole    Tablet 40 before breakfast  spironolactone 25 daily      Vital Signs Last 24 Hrs  T(C): 36.9 (13 Jan 2020 18:12), Max: 37 (13 Jan 2020 01:16)  T(F): 98.5 (13 Jan 2020 18:12), Max: 98.6 (13 Jan 2020 01:16)  HR: 94 (13 Jan 2020 18:12) (68 - 98)  BP: 92/59 (13 Jan 2020 18:12) (88/56 - 100/68)  BP(mean): --  RR: 16 (13 Jan 2020 18:12) (16 - 18)  SpO2: 97% (13 Jan 2020 18:12) (95% - 100%)    PHYSICAL EXAMINATION:  General: Alert and Awake, NAD  HEENT: PERRL, EOMI  Neck: Supple  Cardiac: RRR, No M/R/G  Resp: CTAB, No Wh/Rh/Ra  Abdomen: LLQ drain in abdomen with bloody/purulent drainage, midline abdominal wound/graft site with some drainage on dressing, NBS, moderate tenderness to palpation over the LLQ of the abdomen. NBS, NT/ND, No HSM, No rigidity or guarding  MSK: L medial thigh graft covered. 1+ LE edema  : No starr  Skin: Skin is warm and dry to the touch.   Neuro: Alert and Awake. CN 2-12 Grossly intact. Moves all four extremities spontaneously.  Psych: Calm, Pleasant, Cooperative                            8.8    9.71  )-----------( 275      ( 13 Jan 2020 08:28 )             27.3       01-13    139  |  99  |  13  ----------------------------<  102<H>  3.2<L>   |  26  |  0.73    Ca    9.1      13 Jan 2020 08:28  Mg     1.4     01-13            MICROBIOLOGY:  v  Abdominal Fl Abdominal Fluid  01-09-20   Few Methicillin resistant Staphylococcus aureus  Moderate Enterococcus faecalis  Numerous Escherichia coli  --  Methicillin resistant Staphylococcus aureus  Enterococcus faecalis  Escherichia coli      .Blood Blood  01-08-20   No growth to date.  --  --      .Blood Blood  01-08-20   No growth to date.  --  --                RADIOLOGY:    <The imaging below has been reviewed and visualized me independently>    EXAM:  CT ABDOMEN AND PELVIS IC                 PROCEDURE DATE:  01/08/2020    A new 7.3 x 1.8 cm left lateral abdominal collection. Underwent IR guided drainage of the collection on 1/9.

## 2020-01-14 LAB
ANION GAP SERPL CALC-SCNC: 13 MMOL/L — SIGNIFICANT CHANGE UP (ref 5–17)
BUN SERPL-MCNC: 11 MG/DL — SIGNIFICANT CHANGE UP (ref 7–23)
CALCIUM SERPL-MCNC: 9.1 MG/DL — SIGNIFICANT CHANGE UP (ref 8.4–10.5)
CHLORIDE SERPL-SCNC: 98 MMOL/L — SIGNIFICANT CHANGE UP (ref 96–108)
CO2 SERPL-SCNC: 22 MMOL/L — SIGNIFICANT CHANGE UP (ref 22–31)
CREAT SERPL-MCNC: 0.72 MG/DL — SIGNIFICANT CHANGE UP (ref 0.5–1.3)
CULTURE RESULTS: SIGNIFICANT CHANGE UP
GLUCOSE BLDC GLUCOMTR-MCNC: 123 MG/DL — HIGH (ref 70–99)
GLUCOSE BLDC GLUCOMTR-MCNC: 135 MG/DL — HIGH (ref 70–99)
GLUCOSE BLDC GLUCOMTR-MCNC: 139 MG/DL — HIGH (ref 70–99)
GLUCOSE BLDC GLUCOMTR-MCNC: 142 MG/DL — HIGH (ref 70–99)
GLUCOSE SERPL-MCNC: 101 MG/DL — HIGH (ref 70–99)
MAGNESIUM SERPL-MCNC: 1.7 MG/DL — SIGNIFICANT CHANGE UP (ref 1.6–2.6)
ORGANISM # SPEC MICROSCOPIC CNT: SIGNIFICANT CHANGE UP
PHOSPHATE SERPL-MCNC: 3.5 MG/DL — SIGNIFICANT CHANGE UP (ref 2.5–4.5)
POTASSIUM SERPL-MCNC: 3.7 MMOL/L — SIGNIFICANT CHANGE UP (ref 3.5–5.3)
POTASSIUM SERPL-SCNC: 3.7 MMOL/L — SIGNIFICANT CHANGE UP (ref 3.5–5.3)
SODIUM SERPL-SCNC: 133 MMOL/L — LOW (ref 135–145)
SPECIMEN SOURCE: SIGNIFICANT CHANGE UP

## 2020-01-14 PROCEDURE — 99231 SBSQ HOSP IP/OBS SF/LOW 25: CPT

## 2020-01-14 RX ORDER — FUROSEMIDE 40 MG
40 TABLET ORAL
Refills: 0 | Status: DISCONTINUED | OUTPATIENT
Start: 2020-01-14 | End: 2020-01-16

## 2020-01-14 RX ORDER — POTASSIUM CHLORIDE 20 MEQ
20 PACKET (EA) ORAL ONCE
Refills: 0 | Status: COMPLETED | OUTPATIENT
Start: 2020-01-14 | End: 2020-01-14

## 2020-01-14 RX ORDER — SPIRONOLACTONE 25 MG/1
25 TABLET, FILM COATED ORAL DAILY
Refills: 0 | Status: DISCONTINUED | OUTPATIENT
Start: 2020-01-14 | End: 2020-01-16

## 2020-01-14 RX ADMIN — PANTOPRAZOLE SODIUM 40 MILLIGRAM(S): 20 TABLET, DELAYED RELEASE ORAL at 05:54

## 2020-01-14 RX ADMIN — Medication 20 MILLIEQUIVALENT(S): at 09:17

## 2020-01-14 RX ADMIN — MONTELUKAST 10 MILLIGRAM(S): 4 TABLET, CHEWABLE ORAL at 15:25

## 2020-01-14 RX ADMIN — Medication 100 MILLIGRAM(S): at 17:31

## 2020-01-14 RX ADMIN — BUDESONIDE AND FORMOTEROL FUMARATE DIHYDRATE 2 PUFF(S): 160; 4.5 AEROSOL RESPIRATORY (INHALATION) at 22:55

## 2020-01-14 RX ADMIN — LORATADINE 10 MILLIGRAM(S): 10 TABLET ORAL at 15:25

## 2020-01-14 RX ADMIN — Medication 500 MILLIGRAM(S): at 15:25

## 2020-01-14 RX ADMIN — Medication 500 MILLIGRAM(S): at 22:55

## 2020-01-14 RX ADMIN — ATORVASTATIN CALCIUM 20 MILLIGRAM(S): 80 TABLET, FILM COATED ORAL at 22:55

## 2020-01-14 RX ADMIN — Medication 500 MILLIGRAM(S): at 17:31

## 2020-01-14 RX ADMIN — Medication 1 MILLIGRAM(S): at 15:25

## 2020-01-14 RX ADMIN — Medication 500 MILLIGRAM(S): at 05:53

## 2020-01-14 RX ADMIN — Medication 500 MILLIGRAM(S): at 15:26

## 2020-01-14 RX ADMIN — Medication 325 MILLIGRAM(S): at 05:54

## 2020-01-14 RX ADMIN — APIXABAN 5 MILLIGRAM(S): 2.5 TABLET, FILM COATED ORAL at 17:31

## 2020-01-14 RX ADMIN — Medication 325 MILLIGRAM(S): at 17:32

## 2020-01-14 RX ADMIN — APIXABAN 5 MILLIGRAM(S): 2.5 TABLET, FILM COATED ORAL at 05:54

## 2020-01-14 RX ADMIN — Medication 100 MILLIGRAM(S): at 05:54

## 2020-01-14 RX ADMIN — Medication 500 MILLIGRAM(S): at 05:54

## 2020-01-14 NOTE — PROGRESS NOTE ADULT - SUBJECTIVE AND OBJECTIVE BOX
Morning Surgical Progress Note  Patient is a 74y old  Female who presents with a chief complaint of abdominal wall abscess (13 Jan 2020 14:37)      SUBJECTIVE: Patient seen and examined at bedside with surgical team, patient without complaints.     Vital Signs Last 24 Hrs  T(C): 36.8 (14 Jan 2020 01:02), Max: 37.2 (13 Jan 2020 21:51)  T(F): 98.3 (14 Jan 2020 01:02), Max: 98.9 (13 Jan 2020 21:51)  HR: 95 (14 Jan 2020 01:02) (68 - 98)  BP: 95/60 (14 Jan 2020 01:02) (91/59 - 100/68)  BP(mean): --  RR: 18 (14 Jan 2020 01:02) (16 - 18)  SpO2: 97% (14 Jan 2020 01:02) (95% - 100%)I&O's Detail    12 Jan 2020 07:01  -  13 Jan 2020 07:00  --------------------------------------------------------  IN:    IV PiggyBack: 50 mL    Oral Fluid: 1040 mL  Total IN: 1090 mL    OUT:    Bulb: 20 mL    Colostomy: 500 mL    Voided: 300 mL  Total OUT: 820 mL    Total NET: 270 mL      13 Jan 2020 07:01  -  14 Jan 2020 05:03  --------------------------------------------------------  IN:    Oral Fluid: 910 mL  Total IN: 910 mL    OUT:    Bulb: 23 mL    Colostomy: 150 mL  Total OUT: 173 mL    Total NET: 737 mL      MEDICATIONS  (STANDING):  apixaban 5 milliGRAM(s) Oral every 12 hours  ascorbic acid 500 milliGRAM(s) Oral daily  atorvastatin 20 milliGRAM(s) Oral at bedtime  budesonide 160 MICROgram(s)/formoterol 4.5 MICROgram(s) Inhaler 2 Puff(s) Inhalation at bedtime  cephalexin 500 milliGRAM(s) Oral every 8 hours  dextrose 5%. 1000 milliLiter(s) (50 mL/Hr) IV Continuous <Continuous>  dextrose 50% Injectable 12.5 Gram(s) IV Push once  dextrose 50% Injectable 25 Gram(s) IV Push once  dextrose 50% Injectable 25 Gram(s) IV Push once  doxycycline hyclate Capsule 100 milliGRAM(s) Oral every 12 hours  enalapril 10 milliGRAM(s) Oral two times a day  ferrous    sulfate 325 milliGRAM(s) Oral two times a day  folic acid 1 milliGRAM(s) Oral daily  furosemide    Tablet 40 milliGRAM(s) Oral two times a day  insulin lispro (HumaLOG) corrective regimen sliding scale   SubCutaneous three times a day before meals  insulin lispro (HumaLOG) corrective regimen sliding scale   SubCutaneous at bedtime  loratadine 10 milliGRAM(s) Oral daily  metroNIDAZOLE    Tablet 500 milliGRAM(s) Oral every 12 hours  montelukast 10 milliGRAM(s) Oral daily  pantoprazole    Tablet 40 milliGRAM(s) Oral before breakfast  spironolactone 25 milliGRAM(s) Oral daily    MEDICATIONS  (PRN):  ALBUTerol    90 MICROgram(s) HFA Inhaler 1 Puff(s) Inhalation every 4 hours PRN Wheezing  dextrose 40% Gel 15 Gram(s) Oral once PRN Blood Glucose LESS THAN 70 milliGRAM(s)/deciliter  glucagon  Injectable 1 milliGRAM(s) IntraMuscular once PRN Glucose LESS THAN 70 milligrams/deciliter      Physical Exam  Constitutional: A&Ox3, NAD  Respiratory: CTA b/l  Cardiac:RRR, S1 and S2, no m/r/g  Gastrointestinal: abdomen soft, NT/ND    LABS:                        8.8    9.71  )-----------( 275      ( 13 Jan 2020 08:28 )             27.3     01-13    139  |  99  |  13  ----------------------------<  102<H>  3.2<L>   |  26  |  0.73    Ca    9.1      13 Jan 2020 08:28  Mg     1.4     01-13

## 2020-01-14 NOTE — PROGRESS NOTE ADULT - ASSESSMENT
75 yo female with history of DM2, HTN, COPD, Aflutter on Eliquis and HF with AICD ([employer], interrogated on 09/02/2019), and perforated diverticulitis requiring Lupe in July 2019 which complicated by enterocutaneous fistula and multiple abscesses requiring IR drainage (most recently in Dec 2019 on the left anterior abdominal wall) came in with abdominal pain for 1 day.     U/A with > 124 WBC (culture not sent but patient asymptomatic)  CT A/P (1/8) with A new 7.3 x 1.8 cm left lateral abdominal collection.  Underwent IR guided drainage of collection on 1/9  Abscess culture MRSA, Enterococcus, E coli    Would switch to PO today: Keflex/Flagyl/Doxycycline  PCN allergic but tolerated ceftriaxone previously  Would treat for 3 more days (to complete 7 days of targeted coverage for GI pathogens)  PO regimen would not cover enterococcus but I believe she would have had sufficient coverage with Vancomycin    Overall, MRSA infection, Abdominal Wall Collection, Leukocytosis    --Recommend contact isolation  --Continue Kelex 500 mg PO Q8H (End Date: 1/17/20)  --Continue Flagyl 500 mg PO Q12H (End Date: 1/17/20)  --Continue Doxycycline 100 mg PO Q12H (End Date: 1/17/20)  --Consider repeat CT A/P (with PO and Rectal Contrast - to see if there is persistent fistula - patient had colonoscopy intervention with reported resolution/sealing of fistula)  --Continue to follow CBC with diff  --Continue to follow temperature curve    I will sign off at this time. Please feel free to contact me with any further questions or concerns.    Rex Goode M.D.  [facility] Division of Infectious Disease  8AM-5PM: Pager Number [phone]  After Hours (or if no response): Please contact the Infectious Diseases Office at [phone]

## 2020-01-14 NOTE — PROGRESS NOTE ADULT - SUBJECTIVE AND OBJECTIVE BOX
Follow Up:  abdominal wall abscess    Interval History: no chills or fevers. improved abdominal pain.     REVIEW OF SYSTEMS  [  ] ROS unobtainable because:    [x  ] All other systems negative except as noted below    Constitutional:  [ ] fever [ ] chills  [ ] weight loss  [ ] weakness  Skin:  [ ] rash [ ] phlebitis	  Eyes: [ ] icterus [ ] pain  [ ] discharge	  ENMT: [ ] sore throat  [ ] thrush [ ] ulcers [ ] exudates  Respiratory: [ ] dyspnea [ ] hemoptysis [ ] cough [ ] sputum	  Cardiovascular:  [ ] chest pain [ ] palpitations [ ] edema	  Gastrointestinal:  [ ] nausea [ ] vomiting [ ] diarrhea [ ] constipation [ x] pain	  Genitourinary:  [ ] dysuria [ ] frequency [ ] hematuria [ ] discharge [ ] flank pain  [ ] incontinence  Musculoskeletal:  [ ] myalgias [ ] arthralgias [ ] arthritis  [ ] back pain  Neurological:  [ ] headache [ ] seizures  [ ] confusion/altered mental status    Allergies  Coreg (Other)  digoxin (Other; Short breath (Mild to Mod))  penicillins (Hives)        ANTIMICROBIALS:  cephalexin 500 every 8 hours  doxycycline hyclate Capsule 100 every 12 hours  metroNIDAZOLE    Tablet 500 every 12 hours      OTHER MEDS:  MEDICATIONS  (STANDING):  ALBUTerol    90 MICROgram(s) HFA Inhaler 1 every 4 hours PRN  apixaban 5 every 12 hours  atorvastatin 20 at bedtime  budesonide 160 MICROgram(s)/formoterol 4.5 MICROgram(s) Inhaler 2 at bedtime  dextrose 40% Gel 15 once PRN  dextrose 50% Injectable 12.5 once  dextrose 50% Injectable 25 once  dextrose 50% Injectable 25 once  enalapril 10 two times a day  furosemide    Tablet 40 two times a day  glucagon  Injectable 1 once PRN  insulin lispro (HumaLOG) corrective regimen sliding scale  three times a day before meals  insulin lispro (HumaLOG) corrective regimen sliding scale  at bedtime  loratadine 10 daily  montelukast 10 daily  pantoprazole    Tablet 40 before breakfast  spironolactone 25 daily      Vital Signs Last 24 Hrs  T(C): 36.6 (14 Jan 2020 17:28), Max: 37.2 (13 Jan 2020 21:51)  T(F): 97.9 (14 Jan 2020 17:28), Max: 98.9 (13 Jan 2020 21:51)  HR: 103 (14 Jan 2020 17:28) (88 - 103)  BP: 90/59 (14 Jan 2020 17:28) (90/59 - 101/68)  BP(mean): --  RR: 17 (14 Jan 2020 17:28) (17 - 19)  SpO2: 98% (14 Jan 2020 17:28) (97% - 98%)    PHYSICAL EXAMINATION:  General: Alert and Awake, NAD  HEENT: PERRL, EOMI  Neck: Supple  Cardiac: RRR, No M/R/G  Resp: CTAB, No Wh/Rh/Ra  Abdomen: LLQ drain in abdomen with bloody/purulent drainage, midline abdominal wound/graft site with some drainage on dressing, NBS, moderate tenderness to palpation over the LLQ of the abdomen. NBS, NT/ND, No HSM, No rigidity or guarding  MSK: L medial thigh graft covered. 1+ LE edema  : No starr  Skin: Skin is warm and dry to the touch.   Neuro: Alert and Awake. CN 2-12 Grossly intact. Moves all four extremities spontaneously.  Psych: Calm, Pleasant, Cooperative                          8.8    9.71  )-----------( 275      ( 13 Jan 2020 08:28 )             27.3       01-14    133<L>  |  98  |  11  ----------------------------<  101<H>  3.7   |  22  |  0.72    Ca    9.1      14 Jan 2020 07:44  Phos  3.5     01-14  Mg     1.7     01-14            MICROBIOLOGY:  v  Abdominal Fl Abdominal Fluid  01-09-20   Few Methicillin resistant Staphylococcus aureus  Moderate Enterococcus faecalis  Numerous Escherichia coli  --  Methicillin resistant Staphylococcus aureus  Enterococcus faecalis  Escherichia coli      .Blood Blood  01-08-20   No growth at 5 days.  --  --      .Blood Blood  01-08-20   No growth at 5 days.  --  --                RADIOLOGY:    <The imaging below has been reviewed and visualized me independently>    EXAM:  CT ABDOMEN AND PELVIS IC                 PROCEDURE DATE:  01/08/2020    A new 7.3 x 1.8 cm left lateral abdominal collection. Underwent IR guided drainage of the collection on 1/9.

## 2020-01-15 LAB
GLUCOSE BLDC GLUCOMTR-MCNC: 118 MG/DL — HIGH (ref 70–99)
GLUCOSE BLDC GLUCOMTR-MCNC: 119 MG/DL — HIGH (ref 70–99)
GLUCOSE BLDC GLUCOMTR-MCNC: 146 MG/DL — HIGH (ref 70–99)
GLUCOSE BLDC GLUCOMTR-MCNC: 163 MG/DL — HIGH (ref 70–99)

## 2020-01-15 PROCEDURE — 99231 SBSQ HOSP IP/OBS SF/LOW 25: CPT

## 2020-01-15 RX ORDER — DIPHENHYDRAMINE HCL 50 MG
25 CAPSULE ORAL ONCE
Refills: 0 | Status: DISCONTINUED | OUTPATIENT
Start: 2020-01-15 | End: 2020-01-16

## 2020-01-15 RX ORDER — CHLORHEXIDINE GLUCONATE 213 G/1000ML
1 SOLUTION TOPICAL DAILY
Refills: 0 | Status: DISCONTINUED | OUTPATIENT
Start: 2020-01-15 | End: 2020-01-16

## 2020-01-15 RX ORDER — CEPHALEXIN 500 MG
1 CAPSULE ORAL
Qty: 0 | Refills: 0 | DISCHARGE
Start: 2020-01-15

## 2020-01-15 RX ORDER — DIPHENHYDRAMINE HCL 50 MG
25 CAPSULE ORAL ONCE
Refills: 0 | Status: COMPLETED | OUTPATIENT
Start: 2020-01-15 | End: 2020-01-15

## 2020-01-15 RX ORDER — APIXABAN 2.5 MG/1
1 TABLET, FILM COATED ORAL
Qty: 0 | Refills: 0 | DISCHARGE
Start: 2020-01-15

## 2020-01-15 RX ORDER — METRONIDAZOLE 500 MG
1 TABLET ORAL
Qty: 0 | Refills: 0 | DISCHARGE
Start: 2020-01-15

## 2020-01-15 RX ADMIN — Medication 500 MILLIGRAM(S): at 13:11

## 2020-01-15 RX ADMIN — APIXABAN 5 MILLIGRAM(S): 2.5 TABLET, FILM COATED ORAL at 17:39

## 2020-01-15 RX ADMIN — Medication 10 MILLIGRAM(S): at 05:14

## 2020-01-15 RX ADMIN — Medication 25 MILLIGRAM(S): at 01:57

## 2020-01-15 RX ADMIN — Medication 500 MILLIGRAM(S): at 05:15

## 2020-01-15 RX ADMIN — Medication 100 MILLIGRAM(S): at 17:39

## 2020-01-15 RX ADMIN — PANTOPRAZOLE SODIUM 40 MILLIGRAM(S): 20 TABLET, DELAYED RELEASE ORAL at 05:15

## 2020-01-15 RX ADMIN — Medication 100 MILLIGRAM(S): at 05:14

## 2020-01-15 RX ADMIN — BUDESONIDE AND FORMOTEROL FUMARATE DIHYDRATE 2 PUFF(S): 160; 4.5 AEROSOL RESPIRATORY (INHALATION) at 21:23

## 2020-01-15 RX ADMIN — Medication 325 MILLIGRAM(S): at 05:14

## 2020-01-15 RX ADMIN — APIXABAN 5 MILLIGRAM(S): 2.5 TABLET, FILM COATED ORAL at 05:14

## 2020-01-15 RX ADMIN — MONTELUKAST 10 MILLIGRAM(S): 4 TABLET, CHEWABLE ORAL at 13:11

## 2020-01-15 RX ADMIN — ATORVASTATIN CALCIUM 20 MILLIGRAM(S): 80 TABLET, FILM COATED ORAL at 21:23

## 2020-01-15 RX ADMIN — Medication 500 MILLIGRAM(S): at 21:23

## 2020-01-15 RX ADMIN — Medication 500 MILLIGRAM(S): at 05:14

## 2020-01-15 RX ADMIN — LORATADINE 10 MILLIGRAM(S): 10 TABLET ORAL at 13:12

## 2020-01-15 RX ADMIN — Medication 500 MILLIGRAM(S): at 17:39

## 2020-01-15 RX ADMIN — CHLORHEXIDINE GLUCONATE 1 APPLICATION(S): 213 SOLUTION TOPICAL at 13:12

## 2020-01-15 RX ADMIN — Medication 325 MILLIGRAM(S): at 17:39

## 2020-01-15 RX ADMIN — Medication 1 MILLIGRAM(S): at 13:11

## 2020-01-15 RX ADMIN — Medication 1: at 16:46

## 2020-01-15 NOTE — PROGRESS NOTE ADULT - SUBJECTIVE AND OBJECTIVE BOX
pt seen on 1/15/20 at 1114 AM     Interventional Radiology    S/P left abdominal wall percutaneous drainage, POD # 6.  Pt admits to pain with palpation over left abdomen.  She denies N/V.      Vital Signs Last 24 Hrs  T(C): 36.5 (15 Noah 2020 17:36), Max: 37 (15 Noah 2020 05:12)  T(F): 97.7 (15 Noah 2020 17:36), Max: 98.6 (15 Noah 2020 05:12)  HR: 95 (15 Noah 2020 17:36) (91 - 99)  BP: 97/61 (15 Noah 2020 17:36) (90/56 - 110/70)  BP(mean): --  RR: 16 (15 Noah 2020 17:36) (16 - 18)  SpO2: 97% (15 Noah 2020 17:36) (95% - 98%)    General Appearance: NAD.    Procedure Site (left abdomen): drainage catheter flushed without difficulty. intact and draining to leg bag.     I&O's Detail    OUT:    Bulb: 10 mL     LABS:  Complete Blood Count in AM (01.13.20 @ 08:28)    Nucleated RBC: 0 /100 WBCs    WBC Count: 9.71 K/uL    RBC Count: 2.68 M/uL    Hemoglobin: 8.8 g/dL    Hematocrit: 27.3 %    Mean Cell Volume: 101.9 fl    Mean Cell Hemoglobin: 32.8 pg    Mean Cell Hemoglobin Conc: 32.2 gm/dL    Red Cell Distrib Width: 12.9 %    Platelet Count - Automated: 275 K/uL      01-14    133<L>  |  98  |  11  ----------------------------<  101<H>  3.7   |  22  |  0.72    Ca    9.1      14 Jan 2020 07:44  Phos  3.5     01-14  Mg     1.7     01-14    A/P  74y Female with h/o perforated diverticulitis s/p Lupe in July 2019 complicated by ECF and multiple abscess requiring IR drainage with most recent one done in Dec 2019 that became dislodged with reaccumulation of the left abdominal fluid collection now s/p percutaneous drainage by IR on 1/9/20    Continue global medical management as per primary team.    Forward flush the drainage catheter as ordered.  Maintain dressing around the site.  Monitor and record daily drainage outputs.      When the output from the drainage catheter is 10 ml or less per 24 hour period for at least 1-2 days can consider f/u with IR for tube check.  Contact IR for tube check.  If pt is outpatient then she can contact IR scheduling office at [phone] to scheduling the f/u.      Pt will benefit from VNS services to help take care of the catheter if she is D/C home with the drainage.  If she goes to rehab please give care instructions for the catheter as described above.      Doug Lorenzana, DAMON-C  [facility] [id number]  Ext [phone]

## 2020-01-15 NOTE — DIETITIAN INITIAL EVALUATION ADULT. - REASON INDICATOR FOR ASSESSMENT
Pt seen for length of stay.   Source: Comprehensive chart review and patient  Pertinent chart information: 73 yo female with perforated diverticulitis s/p Lupe in July 2019 complicated by ECF and multiple abscess requiring IR drainage presented from rehab with left abdominal pain after the IR drainage catheter fell off 1 week ago, now s/p IR drain placement 1/9.

## 2020-01-15 NOTE — PROGRESS NOTE ADULT - SUBJECTIVE AND OBJECTIVE BOX
Morning Surgical Progress Note  Patient is a 74y old  Female who presents with a chief complaint of abdominal wall abscess (14 Jan 2020 20:31)      SUBJECTIVE: Patient seen and examined at bedside with surgical team, patient without complaints. There were no acute events overnight.    Vital Signs Last 24 Hrs  T(C): 36.9 (14 Jan 2020 21:49), Max: 36.9 (14 Jan 2020 14:45)  T(F): 98.4 (14 Jan 2020 21:49), Max: 98.4 (14 Jan 2020 14:45)  HR: 91 (14 Jan 2020 21:49) (88 - 103)  BP: 110/70 (14 Jan 2020 21:49) (90/59 - 110/70)  BP(mean): --  RR: 18 (14 Jan 2020 21:49) (17 - 19)  SpO2: 98% (14 Jan 2020 21:49) (97% - 98%)I&O's Detail    13 Jan 2020 07:01  -  14 Jan 2020 07:00  --------------------------------------------------------  IN:    Oral Fluid: 1110 mL  Total IN: 1110 mL    OUT:    Bulb: 23 mL    Colostomy: 150 mL  Total OUT: 173 mL    Total NET: 937 mL      14 Jan 2020 07:01  -  15 Noah 2020 01:04  --------------------------------------------------------  IN:    Oral Fluid: 700 mL  Total IN: 700 mL    OUT:    Bulb: 5 mL    Colostomy: 200 mL    Voided: 250 mL  Total OUT: 455 mL    Total NET: 245 mL      MEDICATIONS  (STANDING):  apixaban 5 milliGRAM(s) Oral every 12 hours  ascorbic acid 500 milliGRAM(s) Oral daily  atorvastatin 20 milliGRAM(s) Oral at bedtime  budesonide 160 MICROgram(s)/formoterol 4.5 MICROgram(s) Inhaler 2 Puff(s) Inhalation at bedtime  cephalexin 500 milliGRAM(s) Oral every 8 hours  dextrose 5%. 1000 milliLiter(s) (50 mL/Hr) IV Continuous <Continuous>  dextrose 50% Injectable 12.5 Gram(s) IV Push once  dextrose 50% Injectable 25 Gram(s) IV Push once  dextrose 50% Injectable 25 Gram(s) IV Push once  doxycycline hyclate Capsule 100 milliGRAM(s) Oral every 12 hours  enalapril 10 milliGRAM(s) Oral two times a day  ferrous    sulfate 325 milliGRAM(s) Oral two times a day  folic acid 1 milliGRAM(s) Oral daily  furosemide    Tablet 40 milliGRAM(s) Oral two times a day  insulin lispro (HumaLOG) corrective regimen sliding scale   SubCutaneous three times a day before meals  insulin lispro (HumaLOG) corrective regimen sliding scale   SubCutaneous at bedtime  loratadine 10 milliGRAM(s) Oral daily  metroNIDAZOLE    Tablet 500 milliGRAM(s) Oral every 12 hours  montelukast 10 milliGRAM(s) Oral daily  pantoprazole    Tablet 40 milliGRAM(s) Oral before breakfast  spironolactone 25 milliGRAM(s) Oral daily    MEDICATIONS  (PRN):  ALBUTerol    90 MICROgram(s) HFA Inhaler 1 Puff(s) Inhalation every 4 hours PRN Wheezing  dextrose 40% Gel 15 Gram(s) Oral once PRN Blood Glucose LESS THAN 70 milliGRAM(s)/deciliter  glucagon  Injectable 1 milliGRAM(s) IntraMuscular once PRN Glucose LESS THAN 70 milligrams/deciliter      Physical Exam  Constitutional: A&Ox3, NAD  Respiratory: CTA b/l  Cardiac: RRR, S1 and S2,   Gastrointestinal: abdomen soft, NT/ND; dressings c/d/i; gas and stool in ostomy    LABS:                        8.8    9.71  )-----------( 275      ( 13 Jan 2020 08:28 )             27.3     01-14    133<L>  |  98  |  11  ----------------------------<  101<H>  3.7   |  22  |  0.72    Ca    9.1      14 Jan 2020 07:44  Phos  3.5     01-14  Mg     1.7     01-14

## 2020-01-15 NOTE — DIETITIAN INITIAL EVALUATION ADULT. - PERTINENT MEDS FT
MEDICATIONS  (STANDING):  apixaban 5 milliGRAM(s) Oral every 12 hours  ascorbic acid 500 milliGRAM(s) Oral daily  atorvastatin 20 milliGRAM(s) Oral at bedtime  budesonide 160 MICROgram(s)/formoterol 4.5 MICROgram(s) Inhaler 2 Puff(s) Inhalation at bedtime  cephalexin 500 milliGRAM(s) Oral every 8 hours  chlorhexidine 4% Liquid 1 Application(s) Topical daily  dextrose 5%. 1000 milliLiter(s) (50 mL/Hr) IV Continuous <Continuous>  dextrose 50% Injectable 12.5 Gram(s) IV Push once  dextrose 50% Injectable 25 Gram(s) IV Push once  dextrose 50% Injectable 25 Gram(s) IV Push once  doxycycline hyclate Capsule 100 milliGRAM(s) Oral every 12 hours  enalapril 10 milliGRAM(s) Oral two times a day  ferrous    sulfate 325 milliGRAM(s) Oral two times a day  folic acid 1 milliGRAM(s) Oral daily  furosemide    Tablet 40 milliGRAM(s) Oral two times a day  insulin lispro (HumaLOG) corrective regimen sliding scale   SubCutaneous three times a day before meals  insulin lispro (HumaLOG) corrective regimen sliding scale   SubCutaneous at bedtime  loratadine 10 milliGRAM(s) Oral daily  metroNIDAZOLE    Tablet 500 milliGRAM(s) Oral every 12 hours  montelukast 10 milliGRAM(s) Oral daily  pantoprazole    Tablet 40 milliGRAM(s) Oral before breakfast  spironolactone 25 milliGRAM(s) Oral daily    MEDICATIONS  (PRN):  ALBUTerol    90 MICROgram(s) HFA Inhaler 1 Puff(s) Inhalation every 4 hours PRN Wheezing  dextrose 40% Gel 15 Gram(s) Oral once PRN Blood Glucose LESS THAN 70 milliGRAM(s)/deciliter  glucagon  Injectable 1 milliGRAM(s) IntraMuscular once PRN Glucose LESS THAN 70 milligrams/deciliter

## 2020-01-15 NOTE — DIETITIAN INITIAL EVALUATION ADULT. - OTHER INFO
Visited pt at bedside.... Visited pt at bedside. Pt reports having a good appetite both PTA and in-house. Pt states that she ate whatever was served at the nursing home and she would normally eat something else if the food was note appetizing to her. Pt documented to consume >75% of meals in-house per flowsheet. Pt denies any known food allergies or intolerances. Pt denies any chewing/swallowing difficulty, self-feeding difficulty, nausea/vomiting, constipation, or diarrhea. Pt endorses taking vit C and iron supplements at home.     Pt reports a UBW of 185lbs ~6 months ago. Pt endorses significant weight loss, some of it being attributed to multiple abscess fluid drainage. Pt amenable to Glucerna shake for weight maintenance.     Education: Pt reports hx of T2DM. Pt states that she doesn't really follow a specific diet but she tries to limit sweetened beverages and foods. Provided education on Carbohydrate Consistent diet including sources of carbohydrates, portion sizes, pairing protein with carbohydrates, limiting sugar sweetened beverages in diet and the importance of consistent eating pattern to help optimize glycemic control. Written material provided.

## 2020-01-15 NOTE — DIETITIAN INITIAL EVALUATION ADULT. - ADD RECOMMEND
Monitor PO intake, diet tolerance, weight trends, labs, and skin integrity. 1) Recommend Glucerna shake (240cal, 10g protein) 1x daily to prevent further weight loss. 2) Monitor PO intake, diet tolerance, weight trends, labs, and skin integrity. 3) Malnutrition sticker placed - spoke to provider.

## 2020-01-15 NOTE — DIETITIAN INITIAL EVALUATION ADULT. - PHYSICAL APPEARANCE
other (specify)/Nutrition focused physical exam conducted with verbal consent from patient. Pt observed with severe muscle wasting (temples, calves) Ht: 64in, Dosing Wt: 141lbs, BMI: 24.2kg/m2, IBW: 120lbs +/- 10%, %IBW: 118%  Edema: none noted   Skin per nursing flowsheets: umbilical wound

## 2020-01-15 NOTE — DIETITIAN INITIAL EVALUATION ADULT. - PERTINENT LABORATORY DATA
Glucose 101(H), A1c 5.1(WDL), Na 133(L)  CAPILLARY BLOOD GLUCOSE      POCT Blood Glucose.: 118 mg/dL (15 Noah 2020 07:51)  POCT Blood Glucose.: 123 mg/dL (14 Jan 2020 21:42)  POCT Blood Glucose.: 142 mg/dL (14 Jan 2020 17:02)  POCT Blood Glucose.: 139 mg/dL (14 Jan 2020 11:51)

## 2020-01-15 NOTE — CHART NOTE - NSCHARTNOTEFT_GEN_A_CORE
Upon Nutritional Assessment by the Registered Dietitian your patient was determined to meet criteria / has evidence of the following diagnosis/diagnoses:          [ ]  Mild Protein Calorie Malnutrition        [ ]  Moderate Protein Calorie Malnutrition        [x] Severe Protein Calorie Malnutrition        [ ] Unspecified Protein Calorie Malnutrition        [ ] Underweight / BMI <19        [ ] Morbid Obesity / BMI > 40      Findings as based on:  [x] Comprehensive nutrition assessment   [x] Nutrition Focused Physical Exam  [x] Other:  significant weight loss and muscle      Nutrition Plan/Recommendations:    1) Recommend Glucerna shake (240cal, 10g protein) 1x daily to prevent further weight loss.   2) Monitor PO intake, diet tolerance, weight trends, labs, and skin integrity.       PROVIDER Section:     By signing this assessment you are acknowledging and agree with the diagnosis/diagnoses assigned by the Registered Dietitian    Comments:

## 2020-01-15 NOTE — PROGRESS NOTE ADULT - ASSESSMENT
75 yo female with perforated diverticulitis s/p Lupe in July 2019 complicated by ECF and multiple abscess requiring IR drainage with most recent one done in Dec 2019, c/b rectal stump leak and fistula S/P endoscopic Ovesco "Bear claw" clip placement in December , presented from rehab with left abdominal pain after the IR drainage catheter fell off 1 week ago, found to have reaccumulation of left abdominal wall abscess measuring 9 x 1.8 cm, now s/p IR drain placement 1/9. Picture c/w persistence of the colon stump leak/fistula and failure of the Ovesco clip.     Plan:  Continue PO Antibiotics  Consistent Carbohydrate Diet  Discharge to [facility]   [phone]

## 2020-01-16 ENCOUNTER — TRANSCRIPTION ENCOUNTER (OUTPATIENT)
Age: 75
End: 2020-01-16

## 2020-01-16 VITALS
HEART RATE: 61 BPM | DIASTOLIC BLOOD PRESSURE: 62 MMHG | OXYGEN SATURATION: 97 % | RESPIRATION RATE: 18 BRPM | SYSTOLIC BLOOD PRESSURE: 114 MMHG | TEMPERATURE: 99 F

## 2020-01-16 LAB
ANION GAP SERPL CALC-SCNC: 13 MMOL/L — SIGNIFICANT CHANGE UP (ref 5–17)
BUN SERPL-MCNC: 13 MG/DL — SIGNIFICANT CHANGE UP (ref 7–23)
CALCIUM SERPL-MCNC: 9.2 MG/DL — SIGNIFICANT CHANGE UP (ref 8.4–10.5)
CHLORIDE SERPL-SCNC: 100 MMOL/L — SIGNIFICANT CHANGE UP (ref 96–108)
CO2 SERPL-SCNC: 24 MMOL/L — SIGNIFICANT CHANGE UP (ref 22–31)
CREAT SERPL-MCNC: 0.71 MG/DL — SIGNIFICANT CHANGE UP (ref 0.5–1.3)
GLUCOSE BLDC GLUCOMTR-MCNC: 105 MG/DL — HIGH (ref 70–99)
GLUCOSE BLDC GLUCOMTR-MCNC: 157 MG/DL — HIGH (ref 70–99)
GLUCOSE SERPL-MCNC: 97 MG/DL — SIGNIFICANT CHANGE UP (ref 70–99)
HCT VFR BLD CALC: 27.9 % — LOW (ref 34.5–45)
HGB BLD-MCNC: 8.9 G/DL — LOW (ref 11.5–15.5)
MAGNESIUM SERPL-MCNC: 1.4 MG/DL — LOW (ref 1.6–2.6)
MCHC RBC-ENTMCNC: 31.9 GM/DL — LOW (ref 32–36)
MCHC RBC-ENTMCNC: 32.8 PG — SIGNIFICANT CHANGE UP (ref 27–34)
MCV RBC AUTO: 103 FL — HIGH (ref 80–100)
NRBC # BLD: 0 /100 WBCS — SIGNIFICANT CHANGE UP (ref 0–0)
PHOSPHATE SERPL-MCNC: 4 MG/DL — SIGNIFICANT CHANGE UP (ref 2.5–4.5)
PLATELET # BLD AUTO: 304 K/UL — SIGNIFICANT CHANGE UP (ref 150–400)
POTASSIUM SERPL-MCNC: 3.7 MMOL/L — SIGNIFICANT CHANGE UP (ref 3.5–5.3)
POTASSIUM SERPL-SCNC: 3.7 MMOL/L — SIGNIFICANT CHANGE UP (ref 3.5–5.3)
RBC # BLD: 2.71 M/UL — LOW (ref 3.8–5.2)
RBC # FLD: 13.1 % — SIGNIFICANT CHANGE UP (ref 10.3–14.5)
SODIUM SERPL-SCNC: 137 MMOL/L — SIGNIFICANT CHANGE UP (ref 135–145)
WBC # BLD: 9.59 K/UL — SIGNIFICANT CHANGE UP (ref 3.8–10.5)
WBC # FLD AUTO: 9.59 K/UL — SIGNIFICANT CHANGE UP (ref 3.8–10.5)

## 2020-01-16 PROCEDURE — 97110 THERAPEUTIC EXERCISES: CPT

## 2020-01-16 PROCEDURE — 87075 CULTR BACTERIA EXCEPT BLOOD: CPT

## 2020-01-16 PROCEDURE — 82330 ASSAY OF CALCIUM: CPT

## 2020-01-16 PROCEDURE — 10030 IMG GID FLU COLL DRG SFT TIS: CPT

## 2020-01-16 PROCEDURE — 85027 COMPLETE CBC AUTOMATED: CPT

## 2020-01-16 PROCEDURE — C1729: CPT

## 2020-01-16 PROCEDURE — 86901 BLOOD TYPING SEROLOGIC RH(D): CPT

## 2020-01-16 PROCEDURE — 80053 COMPREHEN METABOLIC PANEL: CPT

## 2020-01-16 PROCEDURE — 97116 GAIT TRAINING THERAPY: CPT

## 2020-01-16 PROCEDURE — 82435 ASSAY OF BLOOD CHLORIDE: CPT

## 2020-01-16 PROCEDURE — 84132 ASSAY OF SERUM POTASSIUM: CPT

## 2020-01-16 PROCEDURE — 74177 CT ABD & PELVIS W/CONTRAST: CPT

## 2020-01-16 PROCEDURE — 83605 ASSAY OF LACTIC ACID: CPT

## 2020-01-16 PROCEDURE — 97161 PT EVAL LOW COMPLEX 20 MIN: CPT

## 2020-01-16 PROCEDURE — 85730 THROMBOPLASTIN TIME PARTIAL: CPT

## 2020-01-16 PROCEDURE — 87205 SMEAR GRAM STAIN: CPT

## 2020-01-16 PROCEDURE — 86900 BLOOD TYPING SEROLOGIC ABO: CPT

## 2020-01-16 PROCEDURE — 83735 ASSAY OF MAGNESIUM: CPT

## 2020-01-16 PROCEDURE — 82947 ASSAY GLUCOSE BLOOD QUANT: CPT

## 2020-01-16 PROCEDURE — 80202 ASSAY OF VANCOMYCIN: CPT

## 2020-01-16 PROCEDURE — 94640 AIRWAY INHALATION TREATMENT: CPT

## 2020-01-16 PROCEDURE — 81001 URINALYSIS AUTO W/SCOPE: CPT

## 2020-01-16 PROCEDURE — 86850 RBC ANTIBODY SCREEN: CPT

## 2020-01-16 PROCEDURE — 82803 BLOOD GASES ANY COMBINATION: CPT

## 2020-01-16 PROCEDURE — 84295 ASSAY OF SERUM SODIUM: CPT

## 2020-01-16 PROCEDURE — 80048 BASIC METABOLIC PNL TOTAL CA: CPT

## 2020-01-16 PROCEDURE — 85014 HEMATOCRIT: CPT

## 2020-01-16 PROCEDURE — 87186 SC STD MICRODIL/AGAR DIL: CPT

## 2020-01-16 PROCEDURE — 87040 BLOOD CULTURE FOR BACTERIA: CPT

## 2020-01-16 PROCEDURE — 83690 ASSAY OF LIPASE: CPT

## 2020-01-16 PROCEDURE — 84100 ASSAY OF PHOSPHORUS: CPT

## 2020-01-16 PROCEDURE — 96374 THER/PROPH/DIAG INJ IV PUSH: CPT | Mod: XU

## 2020-01-16 PROCEDURE — 87070 CULTURE OTHR SPECIMN AEROBIC: CPT

## 2020-01-16 PROCEDURE — 85610 PROTHROMBIN TIME: CPT

## 2020-01-16 PROCEDURE — 96375 TX/PRO/DX INJ NEW DRUG ADDON: CPT | Mod: XU

## 2020-01-16 PROCEDURE — 82962 GLUCOSE BLOOD TEST: CPT

## 2020-01-16 PROCEDURE — 99285 EMERGENCY DEPT VISIT HI MDM: CPT | Mod: 25

## 2020-01-16 RX ORDER — MAGNESIUM OXIDE 400 MG ORAL TABLET 241.3 MG
400 TABLET ORAL ONCE
Refills: 0 | Status: DISCONTINUED | OUTPATIENT
Start: 2020-01-16 | End: 2020-01-16

## 2020-01-16 RX ORDER — MAGNESIUM OXIDE 400 MG ORAL TABLET 241.3 MG
400 TABLET ORAL ONCE
Refills: 0 | Status: COMPLETED | OUTPATIENT
Start: 2020-01-16 | End: 2020-01-16

## 2020-01-16 RX ORDER — MAGNESIUM SULFATE 500 MG/ML
2 VIAL (ML) INJECTION
Refills: 0 | Status: DISCONTINUED | OUTPATIENT
Start: 2020-01-16 | End: 2020-01-16

## 2020-01-16 RX ADMIN — Medication 100 MILLIGRAM(S): at 05:15

## 2020-01-16 RX ADMIN — MONTELUKAST 10 MILLIGRAM(S): 4 TABLET, CHEWABLE ORAL at 12:04

## 2020-01-16 RX ADMIN — PANTOPRAZOLE SODIUM 40 MILLIGRAM(S): 20 TABLET, DELAYED RELEASE ORAL at 05:15

## 2020-01-16 RX ADMIN — Medication 1: at 12:02

## 2020-01-16 RX ADMIN — Medication 500 MILLIGRAM(S): at 13:54

## 2020-01-16 RX ADMIN — Medication 500 MILLIGRAM(S): at 12:01

## 2020-01-16 RX ADMIN — Medication 325 MILLIGRAM(S): at 05:15

## 2020-01-16 RX ADMIN — Medication 500 MILLIGRAM(S): at 05:15

## 2020-01-16 RX ADMIN — CHLORHEXIDINE GLUCONATE 1 APPLICATION(S): 213 SOLUTION TOPICAL at 12:01

## 2020-01-16 RX ADMIN — Medication 1 MILLIGRAM(S): at 12:01

## 2020-01-16 RX ADMIN — APIXABAN 5 MILLIGRAM(S): 2.5 TABLET, FILM COATED ORAL at 05:15

## 2020-01-16 RX ADMIN — LORATADINE 10 MILLIGRAM(S): 10 TABLET ORAL at 12:01

## 2020-01-16 RX ADMIN — MAGNESIUM OXIDE 400 MG ORAL TABLET 400 MILLIGRAM(S): 241.3 TABLET ORAL at 12:01

## 2020-01-16 NOTE — PROGRESS NOTE ADULT - ASSESSMENT
75 yo female with perforated diverticulitis s/p Lupe in July 2019 complicated by ECF and multiple abscess requiring IR drainage with most recent one done in Dec 2019, c/b rectal stump leak and fistula S/P endoscopic Ovesco "Bear claw" clip placement in December , presented from rehab with left abdominal pain after the IR drainage catheter fell off 1 week ago, found to have reaccumulation of left abdominal wall abscess measuring 9 x 1.8 cm, now s/p IR drain placement 1/9. Picture c/w persistence of the colon stump leak/fistula and failure of the Ovesco clip.     Plan:  Continue PO Antibiotics  Consistent Carbohydrate Diet  Discharge to [facility]   [phone]

## 2020-01-16 NOTE — PROGRESS NOTE ADULT - ATTENDING COMMENTS
I have seen and examined the patient. I agree with the above surgery resident's note.
Patient seen/examined.  Agree w above note and plan and have discussed plan w house staff.  Rehab    Rafi Vázquez MD
Patient seen/examined.  Agree w above note and plan and have discussed plan w house staff.  Three more days abx per ID.  Rehab    Rafi Vázquez MD
I saw and examined the pt and discussed the tx plan with the House Staff. I agree with the exam and plan as documented in the surgery resident's note from today which I edited as indicated.  Agree with ending the ABX upon discharge.  The drain is draining light sanguinous material, same as previously. Picture c/w persistence of the fistula from the rectosigmoid stump. The drain will need to stay indefinitely at this time.   Tracy Jay MD
I saw and examined the pt and discussed the tx plan with the House Staff. I agree with the exam and plan as documented in the surgery resident's note from today which I edited as indicated.  Given there is source control, it is reasonable to complete the IV course of ABX in-house and not to place on ABX upon discharge.   ROBB working on placement.   Tracy Jay MD
Pt seen and examined  Agree with note which was reviewed and edited where appropriate.  D/W patient, RN, residents and Fellow
I saw and examined the pt and discussed the tx plan with the House Staff. I agree with the exam and plan as documented in the surgery resident's note from today which I edited as indicated.  Recurrent collection c/w persistent colon stump fistula and failure of the endoscopic clip.  Continue drain, would favor short course of ABX, then d/c upon discharge to Rehab next week. ID consult.  Tracy Jay MD

## 2020-01-16 NOTE — PROGRESS NOTE ADULT - SUBJECTIVE AND OBJECTIVE BOX
Green Team Surgery Progress Note     SUBJECTIVE / 24H EVENTS  Patient seen and examined on morning rounds. No acute events overnight. Patient has few complaints. She was anemic with an H/H of 6.8/23.1 yesterday. As she is a Rastafarian, blood transfusion was not performed and measures to minimize blood draws were discussed amongst her care team.     OBJECTIVE:    VITAL SIGNS:  T(C): 36.7 (09-16-19 @ 01:28), Max: 36.9 (09-15-19 @ 12:57)  HR: 92 (09-16-19 @ 01:28) (91 - 98)  BP: 122/71 (09-16-19 @ 01:28) (100/64 - 122/71)  RR: 18 (09-16-19 @ 01:28) (18 - 18)  SpO2: 95% (09-16-19 @ 01:28) (94% - 97%)  Daily     Daily   POCT Blood Glucose.: 93 mg/dL (09-15-19 @ 21:22)  POCT Blood Glucose.: 99 mg/dL (09-15-19 @ 18:35)  POCT Blood Glucose.: 91 mg/dL (09-15-19 @ 12:22)      PHYSICAL EXAM:  General: well developed, NAD  Neuro: alert and oriented, no focal deficits, moves all extremities spontaneously  Respiratory: airway patent, respirations unlabored  CVS: regular rate and rhythm  Abdomen: soft, midline abdominal wound dressing c/d/i ostomy pink/viable, two IR drain sites w/ purulent drainage, more medial drain site w/ purulent drainage at drain site, Abdominal wound healing well with granulation tissue covering  Extremities: no edema, sensation and movement grossly intact      09-14-19 @ 07:01  -  09-15-19 @ 07:00  --------------------------------------------------------  IN:    dextrose 5% + sodium chloride 0.9%.: 360 mL    Oral Fluid: 720 mL  Total IN: 1080 mL    OUT:    Colostomy: 150 mL    Drain: 25 mL    Voided: 300 mL  Total OUT: 475 mL    Total NET: 605 mL      09-15-19 @ 07:01  -  09-16-19 @ 01:36  --------------------------------------------------------  IN:    dextrose 5% + sodium chloride 0.9%.: 360 mL    IV PiggyBack: 550 mL    Oral Fluid: 280 mL  Total IN: 1190 mL    OUT:    Colostomy: 100 mL    Drain: 15 mL  Total OUT: 115 mL    Total NET: 1075 mL          LAB VALUES:  09-15    138  |  103  |  9   ----------------------------<  72  3.9   |  25  |  0.51    Ca    8.4      15 Sep 2019 07:42  Phos  1.9     09-15  Mg     1.8     09-15                                 6.8    7.38  )-----------( 232      ( 15 Sep 2019 09:37 )             23.1             MICROBIOLOGY:    Culture - Abscess with Gram Stain (collected 13 Sep 2019 22:59)  Source: .Abscess  Preliminary Report (15 Sep 2019 16:27):    Numerous Escherichia coli ESBL    Numerous Enterococcus faecalis  Organism: Escherichia coli ESBL  Enterococcus faecalis (15 Sep 2019 21:35)  Organism: Enterococcus faecalis (15 Sep 2019 21:35)  Organism: Escherichia coli ESBL (15 Sep 2019 16:27)      RADIOLOGY:  No new radiographic images for review.    MEDICATIONS  (STANDING):  apixaban 5 milliGRAM(s) Oral every 12 hours  atorvastatin 20 milliGRAM(s) Oral at bedtime  chlorhexidine 2% Cloths 1 Application(s) Topical daily  dextrose 5% + sodium chloride 0.9%. 1000 milliLiter(s) (30 mL/Hr) IV Continuous <Continuous>  dextrose 5%. 1000 milliLiter(s) (50 mL/Hr) IV Continuous <Continuous>  dextrose 50% Injectable 12.5 Gram(s) IV Push once  dextrose 50% Injectable 25 Gram(s) IV Push once  dextrose 50% Injectable 25 Gram(s) IV Push once  enalapril 5 milliGRAM(s) Oral daily  ertapenem  IVPB 1000 milliGRAM(s) IV Intermittent every 24 hours  famotidine    Tablet 20 milliGRAM(s) Oral daily  furosemide    Tablet 40 milliGRAM(s) Oral daily  insulin lispro (HumaLOG) corrective regimen sliding scale   SubCutaneous three times a day before meals  insulin lispro (HumaLOG) corrective regimen sliding scale   SubCutaneous at bedtime  magnesium oxide 400 milliGRAM(s) Oral three times a day with meals  montelukast 10 milliGRAM(s) Oral daily  nystatin Powder 1 Application(s) Topical two times a day  spironolactone 25 milliGRAM(s) Oral daily  tiotropium 18 MICROgram(s) Capsule 1 Capsule(s) Inhalation daily  vancomycin  IVPB 750 milliGRAM(s) IV Intermittent every 12 hours    MEDICATIONS  (PRN):  dextrose 40% Gel 15 Gram(s) Oral once PRN Blood Glucose LESS THAN 70 milliGRAM(s)/deciliter  glucagon  Injectable 1 milliGRAM(s) IntraMuscular once PRN Glucose LESS THAN 70 milligrams/deciliter Never

## 2020-01-16 NOTE — DISCHARGE NOTE NURSING/CASE MANAGEMENT/SOCIAL WORK - PATIENT PORTAL LINK FT
You can access the FollowMyHealth Patient Portal offered by [facility] by registering at the following website: [email or web address]. By joining [facility]’s FollowMyHealth portal, you will also be able to view your health information using other applications (apps) compatible with our system.

## 2020-01-16 NOTE — PROGRESS NOTE ADULT - SUBJECTIVE AND OBJECTIVE BOX
Surgery Progress Note    S: Patient seen and examined. No acute events overnight. Reports tolerating diet without nausea, vomiting, passing flatus, having bowel movements, voiding without issues, have been ambulating and out of bed. Denies fever, chills, SOB, chest pain.     O:  Physical Exam  Constitutional: A&Ox3, NAD  Respiratory: CTA b/l  Cardiac: RRR, S1 and S2,   Gastrointestinal: abdomen soft, NT/ND; dressings c/d/i; gas and stool in ostomy    Vital Signs Last 24 Hrs  T(C): 37.2 (16 Jan 2020 01:27), Max: 37.2 (15 Noah 2020 22:10)  T(F): 98.9 (16 Jan 2020 01:27), Max: 99 (15 Noah 2020 22:10)  HR: 95 (16 Jan 2020 01:27) (92 - 99)  BP: 92/56 (16 Jan 2020 01:27) (90/56 - 108/73)  BP(mean): --  RR: 17 (16 Jan 2020 01:27) (16 - 17)  SpO2: 98% (16 Jan 2020 01:27) (96% - 98%)    I&O's Detail    14 Jan 2020 07:01  -  15 Noah 2020 07:00  --------------------------------------------------------  IN:    Oral Fluid: 1120 mL  Total IN: 1120 mL    OUT:    Bulb: 10 mL    Colostomy: 210 mL    Voided: 250 mL  Total OUT: 470 mL    Total NET: 650 mL      15 Noah 2020 07:01  -  16 Jan 2020 04:11  --------------------------------------------------------  IN:    Oral Fluid: 720 mL  Total IN: 720 mL    OUT:    Bulb: 23 mL    Colostomy: 75 mL  Total OUT: 98 mL    Total NET: 622 mL              01-14    133<L>  |  98  |  11  ----------------------------<  101<H>  3.7   |  22  |  0.72    Ca    9.1      14 Jan 2020 07:44  Phos  3.5     01-14  Mg     1.7     01-14

## 2020-01-16 NOTE — PROGRESS NOTE ADULT - REASON FOR ADMISSION
abdominal wall abscess

## 2020-01-25 NOTE — ED ADULT NURSE NOTE - PAIN RATING/NUMBER SCALE (0-10): ACTIVITY
0 not on meds/insulin  -FS w/in control; will obtain A1C Current hb 8.7; 13 on last admission  -no active sources of bleeding; will cont to monitor   -Transfuse PRN if Hb<7

## 2020-01-27 ENCOUNTER — FORM ENCOUNTER (OUTPATIENT)
Age: 75
End: 2020-01-27

## 2020-01-28 ENCOUNTER — OUTPATIENT (OUTPATIENT)
Dept: OUTPATIENT SERVICES | Facility: HOSPITAL | Age: 75
LOS: 1 days | End: 2020-01-28
Payer: MEDICARE

## 2020-01-28 DIAGNOSIS — T14.8XXA OTHER INJURY OF UNSPECIFIED BODY REGION, INITIAL ENCOUNTER: ICD-10-CM

## 2020-01-28 DIAGNOSIS — K65.1 PERITONEAL ABSCESS: ICD-10-CM

## 2020-01-28 DIAGNOSIS — T81.49XA INFECTION FOLLOWING A PROCEDURE, OTHER SURGICAL SITE, INITIAL ENCOUNTER: ICD-10-CM

## 2020-01-28 DIAGNOSIS — Z90.49 ACQUIRED ABSENCE OF OTHER SPECIFIED PARTS OF DIGESTIVE TRACT: Chronic | ICD-10-CM

## 2020-01-28 PROCEDURE — C1769: CPT

## 2020-01-28 PROCEDURE — 49424 ASSESS CYST CONTRAST INJECT: CPT

## 2020-01-28 PROCEDURE — 76080 X-RAY EXAM OF FISTULA: CPT | Mod: 26

## 2020-01-28 PROCEDURE — 72192 CT PELVIS W/O DYE: CPT

## 2020-01-28 PROCEDURE — 72192 CT PELVIS W/O DYE: CPT | Mod: 26

## 2020-01-28 PROCEDURE — 76080 X-RAY EXAM OF FISTULA: CPT

## 2020-01-29 ENCOUNTER — APPOINTMENT (OUTPATIENT)
Dept: SURGERY | Facility: CLINIC | Age: 75
End: 2020-01-29

## 2020-02-10 ENCOUNTER — FORM ENCOUNTER (OUTPATIENT)
Age: 75
End: 2020-02-10

## 2020-02-10 ENCOUNTER — OUTPATIENT (OUTPATIENT)
Dept: OUTPATIENT SERVICES | Facility: HOSPITAL | Age: 75
LOS: 1 days | Discharge: HOME | End: 2020-02-10

## 2020-02-10 DIAGNOSIS — Z90.49 ACQUIRED ABSENCE OF OTHER SPECIFIED PARTS OF DIGESTIVE TRACT: Chronic | ICD-10-CM

## 2020-02-10 DIAGNOSIS — D64.9 ANEMIA, UNSPECIFIED: ICD-10-CM

## 2020-02-11 ENCOUNTER — OUTPATIENT (OUTPATIENT)
Dept: OUTPATIENT SERVICES | Facility: HOSPITAL | Age: 75
LOS: 1 days | End: 2020-02-11
Payer: MEDICARE

## 2020-02-11 DIAGNOSIS — Z90.49 ACQUIRED ABSENCE OF OTHER SPECIFIED PARTS OF DIGESTIVE TRACT: Chronic | ICD-10-CM

## 2020-02-11 DIAGNOSIS — K65.1 PERITONEAL ABSCESS: ICD-10-CM

## 2020-02-11 PROCEDURE — 49423 EXCHANGE DRAINAGE CATHETER: CPT

## 2020-02-11 PROCEDURE — 75984 XRAY CONTROL CATHETER CHANGE: CPT

## 2020-02-11 PROCEDURE — 75984 XRAY CONTROL CATHETER CHANGE: CPT | Mod: 26

## 2020-02-11 PROCEDURE — 74176 CT ABD & PELVIS W/O CONTRAST: CPT

## 2020-02-11 PROCEDURE — 74176 CT ABD & PELVIS W/O CONTRAST: CPT | Mod: 26

## 2020-02-11 PROCEDURE — C1729: CPT

## 2020-02-11 PROCEDURE — C1769: CPT

## 2020-02-11 PROCEDURE — 72192 CT PELVIS W/O DYE: CPT

## 2020-02-12 DIAGNOSIS — T85.628A DISPLACEMENT OF OTHER SPECIFIED INTERNAL PROSTHETIC DEVICES, IMPLANTS AND GRAFTS, INITIAL ENCOUNTER: ICD-10-CM

## 2020-02-13 ENCOUNTER — APPOINTMENT (OUTPATIENT)
Dept: HEMATOLOGY ONCOLOGY | Facility: CLINIC | Age: 75
End: 2020-02-13

## 2020-02-13 NOTE — DIETITIAN INITIAL EVALUATION ADULT. - WEIGHT IN KG
66.2 85.2

## 2020-02-17 DIAGNOSIS — R23.9 UNSPECIFIED SKIN CHANGES: ICD-10-CM

## 2020-02-17 DIAGNOSIS — Z46.82 ENCOUNTER FOR FITTING AND ADJUSTMENT OF NON-VASCULAR CATHETER: ICD-10-CM

## 2020-02-26 ENCOUNTER — APPOINTMENT (OUTPATIENT)
Dept: ELECTROPHYSIOLOGY | Facility: CLINIC | Age: 75
End: 2020-02-26
Payer: MEDICARE

## 2020-02-26 VITALS
HEIGHT: 62 IN | SYSTOLIC BLOOD PRESSURE: 101 MMHG | HEART RATE: 106 BPM | OXYGEN SATURATION: 96 % | DIASTOLIC BLOOD PRESSURE: 65 MMHG

## 2020-02-26 PROCEDURE — 93284 PRGRMG EVAL IMPLANTABLE DFB: CPT

## 2020-03-05 ENCOUNTER — EMERGENCY (EMERGENCY)
Facility: HOSPITAL | Age: 75
LOS: 1 days | Discharge: HOME | End: 2020-03-05
Attending: EMERGENCY MEDICINE
Payer: MEDICARE

## 2020-03-05 VITALS
WEIGHT: 145.06 LBS | HEART RATE: 88 BPM | TEMPERATURE: 98 F | SYSTOLIC BLOOD PRESSURE: 104 MMHG | RESPIRATION RATE: 18 BRPM | OXYGEN SATURATION: 97 % | DIASTOLIC BLOOD PRESSURE: 58 MMHG | HEIGHT: 64 IN

## 2020-03-05 VITALS
OXYGEN SATURATION: 94 % | RESPIRATION RATE: 17 BRPM | SYSTOLIC BLOOD PRESSURE: 104 MMHG | TEMPERATURE: 98 F | DIASTOLIC BLOOD PRESSURE: 64 MMHG | HEART RATE: 96 BPM

## 2020-03-05 DIAGNOSIS — Z90.49 ACQUIRED ABSENCE OF OTHER SPECIFIED PARTS OF DIGESTIVE TRACT: Chronic | ICD-10-CM

## 2020-03-05 LAB
ALBUMIN SERPL ELPH-MCNC: 3.8 G/DL — SIGNIFICANT CHANGE UP (ref 3.3–5)
ALP SERPL-CCNC: 80 U/L — SIGNIFICANT CHANGE UP (ref 40–120)
ALT FLD-CCNC: 6 U/L — LOW (ref 10–45)
ANION GAP SERPL CALC-SCNC: 16 MMOL/L — SIGNIFICANT CHANGE UP (ref 5–17)
APTT BLD: 32.3 SEC — SIGNIFICANT CHANGE UP (ref 27.5–36.3)
AST SERPL-CCNC: 16 U/L — SIGNIFICANT CHANGE UP (ref 10–40)
BASOPHILS # BLD AUTO: 0.03 K/UL — SIGNIFICANT CHANGE UP (ref 0–0.2)
BASOPHILS NFR BLD AUTO: 0.2 % — SIGNIFICANT CHANGE UP (ref 0–2)
BILIRUB SERPL-MCNC: 0.6 MG/DL — SIGNIFICANT CHANGE UP (ref 0.2–1.2)
BUN SERPL-MCNC: 17 MG/DL — SIGNIFICANT CHANGE UP (ref 7–23)
CALCIUM SERPL-MCNC: 9.5 MG/DL — SIGNIFICANT CHANGE UP (ref 8.4–10.5)
CHLORIDE SERPL-SCNC: 96 MMOL/L — SIGNIFICANT CHANGE UP (ref 96–108)
CO2 SERPL-SCNC: 25 MMOL/L — SIGNIFICANT CHANGE UP (ref 22–31)
CREAT SERPL-MCNC: 0.93 MG/DL — SIGNIFICANT CHANGE UP (ref 0.5–1.3)
EOSINOPHIL # BLD AUTO: 0.1 K/UL — SIGNIFICANT CHANGE UP (ref 0–0.5)
EOSINOPHIL NFR BLD AUTO: 0.8 % — SIGNIFICANT CHANGE UP (ref 0–6)
GLUCOSE SERPL-MCNC: 114 MG/DL — HIGH (ref 70–99)
HCT VFR BLD CALC: 35.7 % — SIGNIFICANT CHANGE UP (ref 34.5–45)
HGB BLD-MCNC: 11.5 G/DL — SIGNIFICANT CHANGE UP (ref 11.5–15.5)
IMM GRANULOCYTES NFR BLD AUTO: 0.4 % — SIGNIFICANT CHANGE UP (ref 0–1.5)
INR BLD: 1.68 RATIO — HIGH (ref 0.88–1.16)
LYMPHOCYTES # BLD AUTO: 1.79 K/UL — SIGNIFICANT CHANGE UP (ref 1–3.3)
LYMPHOCYTES # BLD AUTO: 14 % — SIGNIFICANT CHANGE UP (ref 13–44)
MCHC RBC-ENTMCNC: 32.2 GM/DL — SIGNIFICANT CHANGE UP (ref 32–36)
MCHC RBC-ENTMCNC: 32.4 PG — SIGNIFICANT CHANGE UP (ref 27–34)
MCV RBC AUTO: 100.6 FL — HIGH (ref 80–100)
MONOCYTES # BLD AUTO: 0.99 K/UL — HIGH (ref 0–0.9)
MONOCYTES NFR BLD AUTO: 7.7 % — SIGNIFICANT CHANGE UP (ref 2–14)
NEUTROPHILS # BLD AUTO: 9.85 K/UL — HIGH (ref 1.8–7.4)
NEUTROPHILS NFR BLD AUTO: 76.9 % — SIGNIFICANT CHANGE UP (ref 43–77)
NRBC # BLD: 0 /100 WBCS — SIGNIFICANT CHANGE UP (ref 0–0)
PLATELET # BLD AUTO: 253 K/UL — SIGNIFICANT CHANGE UP (ref 150–400)
POTASSIUM SERPL-MCNC: 3.9 MMOL/L — SIGNIFICANT CHANGE UP (ref 3.5–5.3)
POTASSIUM SERPL-SCNC: 3.9 MMOL/L — SIGNIFICANT CHANGE UP (ref 3.5–5.3)
PROT SERPL-MCNC: 8.1 G/DL — SIGNIFICANT CHANGE UP (ref 6–8.3)
PROTHROM AB SERPL-ACNC: 19.5 SEC — HIGH (ref 10–12.9)
RBC # BLD: 3.55 M/UL — LOW (ref 3.8–5.2)
RBC # FLD: 14.2 % — SIGNIFICANT CHANGE UP (ref 10.3–14.5)
SODIUM SERPL-SCNC: 137 MMOL/L — SIGNIFICANT CHANGE UP (ref 135–145)
WBC # BLD: 12.81 K/UL — HIGH (ref 3.8–10.5)
WBC # FLD AUTO: 12.81 K/UL — HIGH (ref 3.8–10.5)

## 2020-03-05 PROCEDURE — 85730 THROMBOPLASTIN TIME PARTIAL: CPT

## 2020-03-05 PROCEDURE — 85610 PROTHROMBIN TIME: CPT

## 2020-03-05 PROCEDURE — 85027 COMPLETE CBC AUTOMATED: CPT

## 2020-03-05 PROCEDURE — 71045 X-RAY EXAM CHEST 1 VIEW: CPT

## 2020-03-05 PROCEDURE — 99283 EMERGENCY DEPT VISIT LOW MDM: CPT | Mod: 25

## 2020-03-05 PROCEDURE — 99284 EMERGENCY DEPT VISIT MOD MDM: CPT

## 2020-03-05 PROCEDURE — 71045 X-RAY EXAM CHEST 1 VIEW: CPT | Mod: 26

## 2020-03-05 PROCEDURE — 80053 COMPREHEN METABOLIC PANEL: CPT

## 2020-03-05 PROCEDURE — 36415 COLL VENOUS BLD VENIPUNCTURE: CPT

## 2020-03-05 NOTE — ED PROVIDER NOTE - PSH
AICD (Automatic Cardioverter/Defibrillator) Present  inserted in Aug, 2008. Due for battery change in 1 month. ( [employer]) . Inserted by Dr Duffy  S/P cholecystectomy    Status post left hemicolectomy

## 2020-03-05 NOTE — ED PROVIDER NOTE - NSFOLLOWUPINSTRUCTIONS_ED_ALL_ED_FT
1) Please follow up with your Primary Care Provider in 24-48 hours  2) Seek immediate medical care for any new or returning symptoms including but not limited severe pain, severe bleeding, difficulty breathing

## 2020-03-05 NOTE — ED PROVIDER NOTE - NS ED ROS FT
GENERAL: No fever or chills, //             EYES: no change in vision, //             HEENT: no trouble swallowing or speaking, //             CARDIAC: no chest pain, //              PULMONARY: cough  //             GI: no abdominal pain, no nausea or no vomiting, no diarrhea or constipation, //             : No changes in urination,  //            SKIN: bleeding from surgical site   //            NEURO: no headache,  //             MSK: No joint pain otherwise as HPI or negative. ~Carmelo Ford DO PGY2

## 2020-03-05 NOTE — ED PROVIDER NOTE - OBJECTIVE STATEMENT
73 yo f pmh DM2, HTN, COPD, Aflutter on Eliquis, HF with AICD ([employer], interrogated on 09/02/2019), and perforated diverticulitis requiring Lupe in July 2019 which complicated by enterocutaneous fistula and multiple abscesses requiring IR drainage (most recently in Dec 2019 on the left anterior abdominal wall), recent admission in January for drain replacements pw bleeding from drain sites. pw daughter who provides collateral. states bleeding started yesterday, soaked through one ABD pad approximately. pt denies all pain. denies n/v, cp, sob. reports cough x 1 week, non productive, has been using tessalon for sx.

## 2020-03-05 NOTE — ED PROVIDER NOTE - PHYSICAL EXAMINATION
General: well appearing, interactive, well nourished, no apparent distress, ncat  HEENT: EOMI, PERRLA, normal mucosa, normal oropharynx, no lesions on the lips or on oral mucosa, normal external ear  Neck: supple, no lymphadenopathy, full range of motion, no nuchal rigidity  CV: RRR, normal S1 and S2 with no murmur, capillary refill less than two seconds  Resp: non labored breathing  Abd: non-distended, soft, non-tender  : no CVA tenderness  MSK: full range of motion, no cyanosis, no edema, no clubbing, no immobility  Neuro: no focal deficits   Skin: bleeding from drainage sites

## 2020-03-05 NOTE — ED ADULT NURSE NOTE - OBJECTIVE STATEMENT
74y f pt with daughter at bedside arrived via ems; emt reports pt c/o bleeding from surgical site; pt state 74y f pt with daughter at bedside arrived via ems; emt reports pt c/o bleeding from surgical site because pt has a cough; pt states was in rehab until 2 weeks ago; pt has had cough since; pt taking tesselon for symptoms; pt has extensive medical hx; pt had perforated diverticulitis july 2019; pt had resection; pt developed fistula with multiple abscesses which required drains placed in IR on right anterior abd wall in december 2019; january 2020 drains replaced; bleeding and drainage started yesterday while pt was coughing; pt has colostomy; abd wall drain; skin graft from right thigh; 3 very small round wounds on lower abd center to left + drainage; pt daughter had covered wounds with gauze; aox3; no resp distress; + cough; pt denies abd pain; no n/v/d; denies fever/chills; + cough/congestion; pt uses walker to ambulate and states has not fallen in 6 mos; iv placed; labs drawn; iv from ems d/c'd not patent; safety/comfort maintained

## 2020-03-05 NOTE — ED PROVIDER NOTE - PATIENT PORTAL LINK FT
You can access the FollowMyHealth Patient Portal offered by [facility] by registering at the following website: [email or web address]. By joining [facility]’s FollowMyHealth portal, you will also be able to view your health information using other applications (apps) compatible with our system.

## 2020-03-05 NOTE — ED PROVIDER NOTE - CLINICAL SUMMARY MEDICAL DECISION MAKING FREE TEXT BOX
75 yo f pw cough x 1 week and bleeding from drainage sites. well appearing, hds, benign abd exam. labs, surg consult. reassess. 75 yo f pw cough x 1 week and bleeding from drainage sites. well appearing, hds, benign abd exam. labs, surg consult. reassess.  Brianna: new cough which has caused increased bleeding from old abd wall fistula.  Will get surgery involved. bleeding is minimal and no tenderness so will hold off on imaging.

## 2020-03-05 NOTE — CONSULT NOTE ADULT - SUBJECTIVE AND OBJECTIVE BOX
GENERAL SURGERY CONSULT NOTE  --------------------------------------------------------------------------------------------    HPI:   Patient is a 74y old  Female who presents with a chief complaint of increased EC fistula drainage    HPI: 73 yo f pmh DM2, HTN, COPD, Aflutter on Eliquis, HF with AICD ([employer], interrogated on 09/02/2019), and perforated diverticulitis requiring Lupe in July 2019 which complicated by enterocutaneous fistula and multiple abscesses requiring IR drainage (most recently in Dec 2019 on the left anterior abdominal wall), recent admission in January for drain replacements pw increased drainage from known EC fistula, likely secondary to URI and excessive coughing mounting and elevated intra-abdominal pressure. Patient continues to have good ostomy output w/o obstructive symptoms. Drainage appears slightly bloodies than usual as per daughter. Denies fevers, N/V. Tolerating normal diet.    Patient denies fevers/chills, denies lightheadedness/dizziness, denies SOB/chest pain, denies nausea/vomiting, denies constipation/diarrhea.     ROS: 10-system review is otherwise negative except HPI above.      PAST MEDICAL & SURGICAL HISTORY:  Refusal of blood transfusions as patient is Evangelical  Patient is Evangelical  Vertigo  Atrial flutter  Diverticulitis  Cardiomyopathy  Kidney stone  Cardiac Pacemaker  HTN - Hypertension  Gout  Diabetes  Congestive Heart Failure  Asthma  Status post left hemicolectomy  S/P cholecystectomy  AICD (Automatic Cardioverter/Defibrillator) Present: inserted in Aug, 2008. Due for battery change in 1 month. ( [employer]) . Inserted by Dr Duffy    FAMILY HISTORY:  Family history of brain tumor  [x] Family history not pertinent as reviewed with the patient and family    SOCIAL HISTORY:  lives at home with daughter caring for her.    ALLERGIES: Coreg (Other)  digoxin (Other; Short breath (Mild to Mod))  metoprolol (Other)  penicillins (Hives)  Solu-Medrol (Other (Mild to Mod))    --------------------------------------------------------------------------------------------    Vitals:   T(C): 36.9 (03-05-20 @ 11:53), Max: 36.9 (03-05-20 @ 11:53)  HR: 101 (03-05-20 @ 11:53) (88 - 101)  BP: 112/74 (03-05-20 @ 11:53) (104/58 - 112/74)  RR: 18 (03-05-20 @ 11:53) (18 - 18)  SpO2: 97% (03-05-20 @ 11:53) (97% - 97%)  CAPILLARY BLOOD GLUCOSE          Height (cm): 162.56 (03-05 @ 11:40)  Weight (kg): 65.8 (03-05 @ 11:40)  BMI (kg/m2): 24.9 (03-05 @ 11:40)  BSA (m2): 1.71 (03-05 @ 11:40)    PHYSICAL EXAM:   General: NAD  Eyes: EOMI  ENT: airway patent, mask over face  Cardiac: RRR, well perfused  Resp: non-labored breathing, no use of accessory muscles, coughing, rales  Abd: soft, NT, ND, drain in LLQ, sero-sanguinous, non-purulent drainage from anterior EC fistulas  Vasc: no le edema  Psych: normal mood and affect  Ext: spont moving all extremities    --------------------------------------------------------------------------------------------    LABS  CBC (03-05 @ 12:38)                              11.5                           12.81<H>  )----------------(  253        76.9  % Neutrophils, 14.0  % Lymphocytes, ANC: 9.85<H>                              35.7      BMP (03-05 @ 12:38)             137     |  96      |  17    		Ca++ --      Ca 9.5                ---------------------------------( 114<H>		Mg --                 3.9     |  25      |  0.93  			Ph --        LFTs (03-05 @ 12:38)      TPro 8.1 / Alb 3.8 / TBili 0.6 / DBili -- / AST 16 / ALT 6<L> / AlkPhos 80    Coags (03-05 @ 12:38)  aPTT 32.3 / INR 1.68<H> / PT 19.5<H>          --------------------------------------------------------------------------------------------      IMAGING    CT ABD/PELV from 2/20 shows EC fistulas and drain

## 2020-03-05 NOTE — ED ADULT NURSE NOTE - NSIMPLEMENTINTERV_GEN_ALL_ED
Implemented All Fall with Harm Risk Interventions:  [place] to call system. Call bell, personal items and telephone within reach. Instruct patient to call for assistance. Room bathroom lighting operational. Non-slip footwear when patient is off stretcher. Physically safe environment: no spills, clutter or unnecessary equipment. Stretcher in lowest position, wheels locked, appropriate side rails in place. Provide visual cue, wrist band, yellow gown, etc. Monitor gait and stability. Monitor for mental status changes and reorient to person, place, and time. Review medications for side effects contributing to fall risk. Reinforce activity limits and safety measures with patient and family. Provide visual clues: red socks.

## 2020-03-05 NOTE — ED PROVIDER NOTE - PMH
Asthma    Atrial flutter    Cardiac Pacemaker    Cardiomyopathy    Congestive Heart Failure    Diabetes    Diverticulitis    Gout    HTN - Hypertension    Kidney stone    Patient is Caodaism    Refusal of blood transfusions as patient is Caodaism    Vertigo

## 2020-03-05 NOTE — ED PROVIDER NOTE - ATTENDING CONTRIBUTION TO CARE
I performed a history and physical exam of the patient and discussed their management with the resident and /or advanced care provider. I reviewed the resident and /or ACP's note and agree with the documented findings and plan of care. My medical decision making and observations are found above.  Abd soft non tender, serosanguinous bleeding from old surg wound.

## 2020-03-05 NOTE — ED ADULT NURSE NOTE - PMH
Asthma    Atrial flutter    Cardiac Pacemaker    Cardiomyopathy    Congestive Heart Failure    Diabetes    Diverticulitis    Gout    HTN - Hypertension    Kidney stone    Patient is Evangelical    Refusal of blood transfusions as patient is Evangelical    Vertigo

## 2020-03-05 NOTE — CONSULT NOTE ADULT - ASSESSMENT
ASSESSMENT: Patient is a 74y old f with increased drainage from EC fistulas 2/2 URI/coughing.    PLAN:   - would recommend increased dressing changes/packing at home  - does not appear purulent or overly bloody  - please make an appointment to see Dr. Clark Alvarado in the office in the next two weeks  - Patient discussed with Attending, Dr. Alvarado

## 2020-03-05 NOTE — ED PROVIDER NOTE - PROGRESS NOTE DETAILS
Patient reassessed, NAD, non-toxic appearing. results dw pt/family, questions answered. cxr clear. low grade leukocytosis noted, afebrile. seen/eval by surgery w/ no acute intervention recommended. pt has ene liao D.O. PGY2

## 2020-03-05 NOTE — ED ADULT NURSE NOTE - PSH
AICD (Automatic Cardioverter/Defibrillator) Present  inserted in Aug, 2008. Due for battery change in 1 month. ( [employer]) . Inserted by Dr Duffy  S/P cholecystectomy    Status post left hemicolectomy

## 2020-03-06 ENCOUNTER — OUTPATIENT (OUTPATIENT)
Dept: OUTPATIENT SERVICES | Facility: HOSPITAL | Age: 75
LOS: 1 days | End: 2020-03-06
Payer: MEDICARE

## 2020-03-06 DIAGNOSIS — K63.2 FISTULA OF INTESTINE: ICD-10-CM

## 2020-03-06 DIAGNOSIS — Z90.49 ACQUIRED ABSENCE OF OTHER SPECIFIED PARTS OF DIGESTIVE TRACT: Chronic | ICD-10-CM

## 2020-03-06 PROCEDURE — 49424 ASSESS CYST CONTRAST INJECT: CPT

## 2020-03-06 PROCEDURE — 76080 X-RAY EXAM OF FISTULA: CPT | Mod: 26

## 2020-03-06 PROCEDURE — 76080 X-RAY EXAM OF FISTULA: CPT

## 2020-03-09 ENCOUNTER — APPOINTMENT (OUTPATIENT)
Dept: HEART FAILURE | Facility: CLINIC | Age: 75
End: 2020-03-09
Payer: MEDICARE

## 2020-03-09 VITALS
WEIGHT: 133 LBS | OXYGEN SATURATION: 96 % | HEART RATE: 96 BPM | BODY MASS INDEX: 24.48 KG/M2 | SYSTOLIC BLOOD PRESSURE: 107 MMHG | DIASTOLIC BLOOD PRESSURE: 66 MMHG | HEIGHT: 62 IN

## 2020-03-09 DIAGNOSIS — J45.909 UNSPECIFIED ASTHMA, UNCOMPLICATED: ICD-10-CM

## 2020-03-09 PROCEDURE — 99214 OFFICE O/P EST MOD 30 MIN: CPT

## 2020-03-09 RX ORDER — ALBUTEROL SULFATE 90 UG/1
108 (90 BASE) AEROSOL, METERED RESPIRATORY (INHALATION)
Refills: 0 | Status: ACTIVE | COMMUNITY
Start: 2020-03-09

## 2020-03-09 RX ORDER — MULTIVIT-MIN/IRON/FOLIC ACID/K 18-600-40
500 CAPSULE ORAL
Refills: 0 | Status: ACTIVE | COMMUNITY
Start: 2020-03-09

## 2020-03-09 RX ORDER — BUDESONIDE AND FORMOTEROL FUMARATE DIHYDRATE 160; 4.5 UG/1; UG/1
160-4.5 AEROSOL RESPIRATORY (INHALATION)
Refills: 0 | Status: ACTIVE | COMMUNITY
Start: 2020-03-09

## 2020-03-09 RX ORDER — FOLIC ACID 1 MG/1
1 TABLET ORAL
Qty: 30 | Refills: 6 | Status: ACTIVE | COMMUNITY
Start: 2020-03-09

## 2020-03-09 RX ORDER — FERROUS SULFATE 325(65) MG
325 TABLET ORAL
Refills: 0 | Status: DISCONTINUED | COMMUNITY
End: 2020-03-09

## 2020-03-09 RX ORDER — PROMETHAZINE HYDROCHLORIDE AND DEXTROMETHORPHAN HYDROBROMIDE ORAL SOLUTION 15; 6.25 MG/5ML; MG/5ML
6.25-15 SOLUTION ORAL
Refills: 0 | Status: DISCONTINUED | COMMUNITY
Start: 2018-07-16 | End: 2020-03-09

## 2020-03-09 RX ORDER — OMEPRAZOLE 20 MG/1
20 CAPSULE, DELAYED RELEASE ORAL DAILY
Refills: 0 | Status: ACTIVE | COMMUNITY
Start: 2020-03-09

## 2020-03-09 RX ORDER — TIOTROPIUM BROMIDE 18 UG/1
18 CAPSULE ORAL; RESPIRATORY (INHALATION)
Refills: 0 | Status: DISCONTINUED | COMMUNITY
End: 2020-03-09

## 2020-03-09 NOTE — REASON FOR VISIT
[Follow-Up - Clinic] : a clinic follow-up of [Cardiomyopathy] : cardiomyopathy [Dyspnea] : dyspnea [Heart Failure] : congestive heart failure [Family Member] : family member

## 2020-03-10 DIAGNOSIS — Z46.82 ENCOUNTER FOR FITTING AND ADJUSTMENT OF NON-VASCULAR CATHETER: ICD-10-CM

## 2020-03-11 NOTE — PHYSICAL EXAM
[Normal Conjunctiva] : the conjunctiva exhibited no abnormalities [Normal Oral Mucosa] : normal oral mucosa [No Oral Cyanosis] : no oral cyanosis [] : no respiratory distress [Respiration, Rhythm And Depth] : normal respiratory rhythm and effort [Auscultation Breath Sounds / Voice Sounds] : lungs were clear to auscultation bilaterally [Heart Rate And Rhythm] : heart rate and rhythm were normal [Heart Sounds] : normal S1 and S2 [Arterial Pulses Normal] : the arterial pulses were normal [Bowel Sounds] : normal bowel sounds [Abdomen Soft] : soft [Abnormal Walk] : normal gait [Nail Clubbing] : no clubbing of the fingernails [Cyanosis, Localized] : no localized cyanosis [Skin Color & Pigmentation] : normal skin color and pigmentation [Skin Turgor] : normal skin turgor [No Venous Stasis] : no venous stasis [Oriented To Time, Place, And Person] : oriented to person, place, and time [Impaired Insight] : insight and judgment were intact [No Anxiety] : not feeling anxious [FreeTextEntry1] : Very distant heart sounds. Bilateral lower extremity, pitting, 1+ bilaterally.

## 2020-03-11 NOTE — DISCUSSION/SUMMARY
[FreeTextEntry1] : 1) I will continue her current neurohormonal antagonists for her chronic systolic heart failure\par 2) She will continue Eliquis for atrial fibrillation. \par 3) I have encouraged her to increase her physical activity. \par 4) Follow-up in two months.\par

## 2020-03-11 NOTE — HISTORY OF PRESENT ILLNESS
[FreeTextEntry1] : Ms Dolan is a 74 year old woman with an extensive medical history notable for asthma which she has had since childhood  and has not been well controlled, diabetes ,diverticulosis and HTN. She received a pacemaker many years ago, which was subsequently upgraded to CRT-D ( Medtronic) device in 2013.   An echocardiogram done May 1, 2016 revealed an EF of 35%, although repeat echocardiogram last year showed only mild decrease in her LV systolic function.  Her other comorbidities include a history of GI bleed and a history of respiratory failure leaded to tracheostomy and PEG in 2016. More recently she had a prolonged hospitalization due to a perforated diverticulum leading to an exploratory laparotomy, partial colectomy, end colostomy, and need for a temporary VAD abdominal wound dressing. Her course has been complicated by infections. She was eventually discharged to rehab and presents for her first follow-up visit with me since then and since return to home. \par \par She currently is stable. She is in a wheelchair. She is very week and has had significant weight loss. She is only able to walk short distances, largely due to weakness. She is not short of breath. She has no PND or orthopnea. She notes that the chest pressure that she had chronically has resolved with the excessive weight loss she experienced through her illness. She notes mild swelling in her legs. She has no PND or orthopnea. She has no fevers or chills.

## 2020-03-11 NOTE — ASSESSMENT
[FreeTextEntry1] : Ms. Dolan is a 74 year old woman with heart failure with mildly reduced ejection fraction, possibly related to hypertension. She is ACC/AHA stage C with NYHA class III symptoms. She is s/p CRT-D. She is currently limited by weakness and frailty. She had massive weight loss in the setting of acute illness following her perforated diverticulum. She is normotensive and euvolemic on exam.

## 2020-03-13 ENCOUNTER — APPOINTMENT (OUTPATIENT)
Dept: SURGERY | Facility: CLINIC | Age: 75
End: 2020-03-13
Payer: MEDICARE

## 2020-03-13 VITALS
DIASTOLIC BLOOD PRESSURE: 64 MMHG | SYSTOLIC BLOOD PRESSURE: 102 MMHG | TEMPERATURE: 98.2 F | OXYGEN SATURATION: 98 % | HEART RATE: 97 BPM | RESPIRATION RATE: 17 BRPM

## 2020-03-13 PROCEDURE — 99213 OFFICE O/P EST LOW 20 MIN: CPT

## 2020-03-13 NOTE — HISTORY OF PRESENT ILLNESS
[de-identified] : Pretty is a 73 y/o female here for follow up visit. Patient underwent ex-lap and Lupe's procedure for perforated diverticulitis in July with abdomen left open, multiple washouts, then abdominal closure with vicryl mesh (fascia left open) and skin graft over wound. She subsequently developed a sigmoid stump leak, with left-sided abdominal fluid collection which was drained and fistula to inferior pole of wound. Last seen on 10/25/19. Came home from rehab 30 days ago.  Recent drain study showed collapsed cavity without fistula.  Drain has been putting out scant thin fluid.  Lower abdominal wound is closing well with dressing changed BID, scant drainage on dressing.  Worse when she coughs.\par \par Has been otherwise doing very well -- eating well, feels more energetic, no fevers or malaise.\par

## 2020-03-13 NOTE — ASSESSMENT
[FreeTextEntry1] : 74F s/p ex-lap and Lupe's procedure for perforated diverticulitis in July with abdomen left open, multiple washouts, then abdominal closure with vicryl mesh (fascia left open) and skin graft over wound.  She subsequently developed a sigmoid stump leak, with left-sided abdominal fluid collection which was drained and fistula to inferior pole of wound, which is very well contained.  \par \par She continues to heal well, with no communication to bowel seen on recent left abdominal drain study.  Lower midline fistula opening is diminishing in size with very low output.\par

## 2020-03-13 NOTE — PHYSICAL EXAM
[de-identified] : well-appearing, appears stated age, no acute distress  [de-identified] : sclerae anicteric, mucous membranes moist, normocephalic, trachea midline  [de-identified] : normal respiratory effort [de-identified] : soft, NT, ND.  Lower midline pinpoint wound with small amount of beige fluid staining dressing.  Linear 2.5cm wound to its left is well granulated and clean.  Left abdominal drain with scant thin fluid in bag, skin clean and dry.

## 2020-03-13 NOTE — PLAN
[FreeTextEntry1] : [] continue current diet; advised that adequate nutrition will help this heal\par [] monitor drains\par [] f/u 1 month\par

## 2020-03-20 ENCOUNTER — APPOINTMENT (OUTPATIENT)
Dept: SURGERY | Facility: CLINIC | Age: 75
End: 2020-03-20
Payer: MEDICARE

## 2020-03-20 ENCOUNTER — APPOINTMENT (OUTPATIENT)
Dept: SURGERY | Facility: CLINIC | Age: 75
End: 2020-03-20

## 2020-03-20 VITALS
TEMPERATURE: 97.5 F | OXYGEN SATURATION: 96 % | SYSTOLIC BLOOD PRESSURE: 107 MMHG | DIASTOLIC BLOOD PRESSURE: 66 MMHG | HEART RATE: 96 BPM | RESPIRATION RATE: 18 BRPM

## 2020-03-20 PROCEDURE — 99213 OFFICE O/P EST LOW 20 MIN: CPT

## 2020-03-20 NOTE — HISTORY OF PRESENT ILLNESS
[de-identified] : 73 y/o female underwent  ex-lap and Lupe's procedure for perforated diverticulitis in July with abdomen left open, multiple washouts, then abdominal closure with vicryl mesh (fascia left open) and skin graft over wound. She subsequently developed a sigmoid stump leak, with left-sided abdominal fluid collection which was drained and fistula to inferior pole of wound. Last seen on 03/13/2020, patient healing well, no communication to bowel seen in recent left abdominal  drain study. Lower midline fistula opening is diminishing in size with very low input. \par \par Patient returns today after daughter noticed increased drainage from lower abdominal fistula over last 2 days.  Today, no drainage noted.  No fevers, chills, malaise, etc.  Noted firmer ostomy output.  No abdominal pain.  Left sided drain putting out about 15mL per day.

## 2020-03-20 NOTE — ASSESSMENT
[FreeTextEntry1] : 74F s/p ex-lap and Lpue's procedure for perforated diverticulitis in July with abdomen left open, multiple washouts, then abdominal closure with vicryl mesh (fascia left open) and skin graft over wound.  She subsequently developed a sigmoid stump leak, with left-sided abdominal fluid collection which is drained. \par \par She continues to heal well, with no communication to bowel seen on recent left abdominal drain study.  Lower midline fistula has closed.\par

## 2020-03-20 NOTE — PLAN
[FreeTextEntry1] : Continue dressing changed BID as needed with dry gauze\par \par Colace/dulcolax or miralax as needed PRN constipation\par \par f/u 1 month

## 2020-03-20 NOTE — PHYSICAL EXAM
[Calm] : calm [de-identified] : well-appearing, appears stated age, no acute distress  [de-identified] : sclerae anicteric, mucous membranes moist, normocephalic, trachea midline  [de-identified] : normal respiratory effort [de-identified] : soft, NT, ND.  Lower midline granulated wound with no drainage, no fluctuance.  Linear 2. 1.5cm wound to its left is well granulated and clean.  Left abdominal drain with small amount of reddish purulent fluid fluid in bag, skin clean and dry.  Dressings changed.

## 2020-04-17 ENCOUNTER — OUTPATIENT (OUTPATIENT)
Dept: OUTPATIENT SERVICES | Facility: HOSPITAL | Age: 75
LOS: 1 days | End: 2020-04-17
Payer: MEDICARE

## 2020-04-17 ENCOUNTER — RESULT REVIEW (OUTPATIENT)
Age: 75
End: 2020-04-17

## 2020-04-17 DIAGNOSIS — K63.2 FISTULA OF INTESTINE: ICD-10-CM

## 2020-04-17 DIAGNOSIS — Z90.49 ACQUIRED ABSENCE OF OTHER SPECIFIED PARTS OF DIGESTIVE TRACT: Chronic | ICD-10-CM

## 2020-04-17 PROCEDURE — 49423 EXCHANGE DRAINAGE CATHETER: CPT

## 2020-04-17 PROCEDURE — C1729: CPT

## 2020-04-17 PROCEDURE — 75984 XRAY CONTROL CATHETER CHANGE: CPT | Mod: 26

## 2020-04-17 PROCEDURE — C1769: CPT

## 2020-04-17 PROCEDURE — 75984 XRAY CONTROL CATHETER CHANGE: CPT

## 2020-04-21 DIAGNOSIS — K63.2 FISTULA OF INTESTINE: ICD-10-CM

## 2020-04-21 DIAGNOSIS — Z46.82 ENCOUNTER FOR FITTING AND ADJUSTMENT OF NON-VASCULAR CATHETER: ICD-10-CM

## 2020-06-02 NOTE — PROVIDER CONTACT NOTE (CRITICAL VALUE NOTIFICATION) - PERSON GIVING RESULT:
Airway  Urgency: elective    Date/Time: 6/2/2020 11:05 AM  Airway not difficult    General Information and Staff    Patient location during procedure: OR  CRNA: Alexy Walters CRNA    Indications and Patient Condition  Indications for airway management: airway protection    Preoxygenated: yes  MILS not maintained throughout  Mask difficulty assessment: 1 - vent by mask    Final Airway Details  Final airway type: endotracheal airway      Successful airway: ETT  Cuffed: yes   Successful intubation technique: direct laryngoscopy  Facilitating devices/methods: intubating stylet  Endotracheal tube insertion site: oral  Blade: David  Blade size: 4  ETT size (mm): 7.5  Cormack-Lehane Classification: grade I - full view of glottis  Placement verified by: chest auscultation and capnometry   Measured from: lips  ETT/EBT  to lips (cm): 20  Number of attempts at approach: 1    Additional Comments  Negative epigastric sounds, Breath sound equal bilaterally with symmetric chest rise and fall            
Luz Alberto - lab
Malathi Facanthony
vanna Morse
Maggie Morse

## 2020-06-21 ENCOUNTER — EMERGENCY (EMERGENCY)
Facility: HOSPITAL | Age: 75
LOS: 1 days | Discharge: HOME | End: 2020-06-21
Attending: PERSONAL EMERGENCY RESPONSE ATTENDANT
Payer: MEDICARE

## 2020-06-21 VITALS
HEART RATE: 80 BPM | SYSTOLIC BLOOD PRESSURE: 123 MMHG | RESPIRATION RATE: 16 BRPM | DIASTOLIC BLOOD PRESSURE: 66 MMHG | TEMPERATURE: 98 F | OXYGEN SATURATION: 94 %

## 2020-06-21 VITALS
SYSTOLIC BLOOD PRESSURE: 118 MMHG | HEART RATE: 90 BPM | WEIGHT: 132.94 LBS | RESPIRATION RATE: 19 BRPM | OXYGEN SATURATION: 98 % | DIASTOLIC BLOOD PRESSURE: 64 MMHG | TEMPERATURE: 99 F | HEIGHT: 62 IN

## 2020-06-21 DIAGNOSIS — Z90.49 ACQUIRED ABSENCE OF OTHER SPECIFIED PARTS OF DIGESTIVE TRACT: Chronic | ICD-10-CM

## 2020-06-21 LAB
ALBUMIN SERPL ELPH-MCNC: 3.9 G/DL — SIGNIFICANT CHANGE UP (ref 3.3–5)
ALP SERPL-CCNC: 85 U/L — SIGNIFICANT CHANGE UP (ref 40–120)
ALT FLD-CCNC: 8 U/L — LOW (ref 10–45)
ANION GAP SERPL CALC-SCNC: 12 MMOL/L — SIGNIFICANT CHANGE UP (ref 5–17)
APTT BLD: 31.6 SEC — SIGNIFICANT CHANGE UP (ref 27.5–36.3)
AST SERPL-CCNC: 18 U/L — SIGNIFICANT CHANGE UP (ref 10–40)
BASOPHILS # BLD AUTO: 0.04 K/UL — SIGNIFICANT CHANGE UP (ref 0–0.2)
BASOPHILS NFR BLD AUTO: 0.5 % — SIGNIFICANT CHANGE UP (ref 0–2)
BILIRUB SERPL-MCNC: 0.4 MG/DL — SIGNIFICANT CHANGE UP (ref 0.2–1.2)
BUN SERPL-MCNC: 22 MG/DL — SIGNIFICANT CHANGE UP (ref 7–23)
CALCIUM SERPL-MCNC: 9.2 MG/DL — SIGNIFICANT CHANGE UP (ref 8.4–10.5)
CHLORIDE SERPL-SCNC: 101 MMOL/L — SIGNIFICANT CHANGE UP (ref 96–108)
CO2 SERPL-SCNC: 28 MMOL/L — SIGNIFICANT CHANGE UP (ref 22–31)
CREAT SERPL-MCNC: 1.03 MG/DL — SIGNIFICANT CHANGE UP (ref 0.5–1.3)
EOSINOPHIL # BLD AUTO: 0.44 K/UL — SIGNIFICANT CHANGE UP (ref 0–0.5)
EOSINOPHIL NFR BLD AUTO: 5.6 % — SIGNIFICANT CHANGE UP (ref 0–6)
GLUCOSE SERPL-MCNC: 162 MG/DL — HIGH (ref 70–99)
HCT VFR BLD CALC: 32.9 % — LOW (ref 34.5–45)
HGB BLD-MCNC: 10.5 G/DL — LOW (ref 11.5–15.5)
IMM GRANULOCYTES NFR BLD AUTO: 0.3 % — SIGNIFICANT CHANGE UP (ref 0–1.5)
INR BLD: 1.34 RATIO — HIGH (ref 0.88–1.16)
LYMPHOCYTES # BLD AUTO: 2.13 K/UL — SIGNIFICANT CHANGE UP (ref 1–3.3)
LYMPHOCYTES # BLD AUTO: 27.1 % — SIGNIFICANT CHANGE UP (ref 13–44)
MCHC RBC-ENTMCNC: 31.7 PG — SIGNIFICANT CHANGE UP (ref 27–34)
MCHC RBC-ENTMCNC: 31.9 GM/DL — LOW (ref 32–36)
MCV RBC AUTO: 99.4 FL — SIGNIFICANT CHANGE UP (ref 80–100)
MONOCYTES # BLD AUTO: 0.48 K/UL — SIGNIFICANT CHANGE UP (ref 0–0.9)
MONOCYTES NFR BLD AUTO: 6.1 % — SIGNIFICANT CHANGE UP (ref 2–14)
NEUTROPHILS # BLD AUTO: 4.76 K/UL — SIGNIFICANT CHANGE UP (ref 1.8–7.4)
NEUTROPHILS NFR BLD AUTO: 60.4 % — SIGNIFICANT CHANGE UP (ref 43–77)
NRBC # BLD: 0 /100 WBCS — SIGNIFICANT CHANGE UP (ref 0–0)
PLATELET # BLD AUTO: 209 K/UL — SIGNIFICANT CHANGE UP (ref 150–400)
POTASSIUM SERPL-MCNC: 3.4 MMOL/L — LOW (ref 3.5–5.3)
POTASSIUM SERPL-SCNC: 3.4 MMOL/L — LOW (ref 3.5–5.3)
PROT SERPL-MCNC: 7 G/DL — SIGNIFICANT CHANGE UP (ref 6–8.3)
PROTHROM AB SERPL-ACNC: 15.4 SEC — HIGH (ref 10–12.9)
RBC # BLD: 3.31 M/UL — LOW (ref 3.8–5.2)
RBC # FLD: 15 % — HIGH (ref 10.3–14.5)
SARS-COV-2 RNA SPEC QL NAA+PROBE: SIGNIFICANT CHANGE UP
SODIUM SERPL-SCNC: 141 MMOL/L — SIGNIFICANT CHANGE UP (ref 135–145)
TROPONIN T, HIGH SENSITIVITY RESULT: 85 NG/L — HIGH (ref 0–51)
TROPONIN T, HIGH SENSITIVITY RESULT: 93 NG/L — HIGH (ref 0–51)
WBC # BLD: 7.87 K/UL — SIGNIFICANT CHANGE UP (ref 3.8–10.5)
WBC # FLD AUTO: 7.87 K/UL — SIGNIFICANT CHANGE UP (ref 3.8–10.5)

## 2020-06-21 PROCEDURE — 74177 CT ABD & PELVIS W/CONTRAST: CPT

## 2020-06-21 PROCEDURE — 85730 THROMBOPLASTIN TIME PARTIAL: CPT

## 2020-06-21 PROCEDURE — 71275 CT ANGIOGRAPHY CHEST: CPT | Mod: 26

## 2020-06-21 PROCEDURE — 71046 X-RAY EXAM CHEST 2 VIEWS: CPT

## 2020-06-21 PROCEDURE — 94640 AIRWAY INHALATION TREATMENT: CPT

## 2020-06-21 PROCEDURE — 93005 ELECTROCARDIOGRAM TRACING: CPT

## 2020-06-21 PROCEDURE — 93010 ELECTROCARDIOGRAM REPORT: CPT

## 2020-06-21 PROCEDURE — 80053 COMPREHEN METABOLIC PANEL: CPT

## 2020-06-21 PROCEDURE — 71046 X-RAY EXAM CHEST 2 VIEWS: CPT | Mod: 26

## 2020-06-21 PROCEDURE — 74177 CT ABD & PELVIS W/CONTRAST: CPT | Mod: 26

## 2020-06-21 PROCEDURE — 99285 EMERGENCY DEPT VISIT HI MDM: CPT

## 2020-06-21 PROCEDURE — 99284 EMERGENCY DEPT VISIT MOD MDM: CPT | Mod: 25

## 2020-06-21 PROCEDURE — 84484 ASSAY OF TROPONIN QUANT: CPT

## 2020-06-21 PROCEDURE — 85027 COMPLETE CBC AUTOMATED: CPT

## 2020-06-21 PROCEDURE — 85610 PROTHROMBIN TIME: CPT

## 2020-06-21 PROCEDURE — 71275 CT ANGIOGRAPHY CHEST: CPT

## 2020-06-21 RX ORDER — ALBUTEROL 90 UG/1
1 AEROSOL, METERED ORAL ONCE
Refills: 0 | Status: COMPLETED | OUTPATIENT
Start: 2020-06-21 | End: 2020-06-21

## 2020-06-21 RX ORDER — ACETAMINOPHEN 500 MG
650 TABLET ORAL ONCE
Refills: 0 | Status: COMPLETED | OUTPATIENT
Start: 2020-06-21 | End: 2020-06-21

## 2020-06-21 RX ADMIN — ALBUTEROL 1 PUFF(S): 90 AEROSOL, METERED ORAL at 10:41

## 2020-06-21 NOTE — ED ADULT NURSE REASSESSMENT NOTE - NS ED NURSE REASSESS COMMENT FT1
Pt A+Ox3, VSS, IV removed, cleared for d/c by cardiology and JANAY Paul. Pt verbalizes understanding of d/c instructions, follow up with cardiology outpatient.

## 2020-06-21 NOTE — CONSULT NOTE ADULT - ATTENDING COMMENTS
I did not examen Ms. Dolan, but I spoke with her and her daughter on the phone. I reviewed her lab findings and imaging performed in the ED. She appears to have pain lateral to her pacemaker generator, which may have migrated due to weight loss associated with her recent illness. She is unlikely to have had a coronary event or be experiencing ischemia. She has no evidence of PE. She will be seeing the EP clinic and me this Friday.

## 2020-06-21 NOTE — ED ADULT NURSE NOTE - CHIEF COMPLAINT QUOTE
PPM displaced (due to wt loss --> after being admitted to [facility] x 8 mos for diverticulitis/colostomy surgery), per EMS, PPM intact, functioning normally

## 2020-06-21 NOTE — ED PROVIDER NOTE - PROGRESS NOTE DETAILS
spoke with cardiology fellow will come eval and discuss with Dr. Hobson. patient with medtronic device. discussed results thus far with patient - Olga Paul PA-C cardiology evaluated patient, interrogated device, spoke with EP attending regarding migration as well as patients OP cardiologist Dr. Hobson, feel confident this is related to device migration, agree does not appear cardiac in nature. has appt with device clinic as well as cardiologist on friday of this week. will discharge with chose return precautions and follow up

## 2020-06-21 NOTE — ED ADULT TRIAGE NOTE - CHIEF COMPLAINT QUOTE
PPM displaced (due to wt loss --> after being admitted to [facility] x 8 mos for diverticulitis/colostomy surgery), per EMS, PPM intact, functioning normally

## 2020-06-21 NOTE — ED PROVIDER NOTE - OBJECTIVE STATEMENT
76 y/o female A.Flutter with pacer and on eliquis, cardiomyopathy with associated CHF, HTN, Asthma, diverticulitis surgery last year with colostomy and subsequent 50 lb weight loss presents to the ED for pleruitic chest pain at the site of her pacemaker. patient notes that 2/2 weight loss the pacer has been migrating signiciantly and this morniung noted pain directly at the site of the pacer without radiation that is 5/10. no SOB, worsening or unilateral LE edema. no fever/chills. chronic unchanged cough. no vomiting or diarrhea.

## 2020-06-21 NOTE — ED ADULT NURSE NOTE - OBJECTIVE STATEMENT
75 year old female presenting to ED, by EMS from home, c/o permanent pacemaker dislocation. PMH includes atrial flutter, diverticulitis, cardiomyopathy, kidney stone, cardiac pacemaker, HTN, gout, diabetes, CHF, asthma. Pt A+Ox3, moves all four extremities with walker, reports pacemaker dislocated from left chest to left armpit due to 50lb weight loss s/p diverticulitis surgery. Pt reports 5/10 pain only to palpation of pacemaker that began this morning, bringing her in today, and LLQ tenderness to palpation since placement of drain 6/2019. Pt reports taking Tylenol around 0930 this AM, with no relief. Pt presents to ED with left sided gastric drain with very minimal output, and a right sided colostomy bag with soft brown stool. Pt presents with productive cough, since 2016 with clear white sputum as per pt. Pt denies alleviating or exacerbating factors. Pt denies headache, dizziness, lightheadedness, change in vision, chest pain, SOB, N/V, urinary or bowel symptoms.

## 2020-06-21 NOTE — ED PROVIDER NOTE - PMH
Asthma    Atrial flutter    Cardiac Pacemaker    Cardiomyopathy    Congestive Heart Failure    Diabetes    Diverticulitis    Gout    HTN - Hypertension    Kidney stone    Patient is Jew    Refusal of blood transfusions as patient is Jew    Vertigo

## 2020-06-21 NOTE — ED PROVIDER NOTE - PATIENT PORTAL LINK FT
You can access the FollowMyHealth Patient Portal offered by [facility] by registering at the following website: [email or web address]. By joining [facility]’s FollowMyHealth portal, you will also be able to view your health information using other applications (apps) compatible with our system.

## 2020-06-21 NOTE — ED PROVIDER NOTE - CARDIAC, MLM
Normal rate, regular rhythm.  Heart sounds S1, S2.  No murmurs, rubs or gallops. pacer clearly displaced laterally, sits under axilla

## 2020-06-21 NOTE — ED PROVIDER NOTE - ATTENDING CONTRIBUTION TO CARE
Attending MD Nolan.  Agree with HPI as authored by PA.  CTA chest ordered for pleuritic CP, CTAP ordered for lower abdominal TTP.  CTA's non-actionable.  Labs c/w trop elevation.  Cardiology called for consult 2/2 pt of Dr. Hobson.  Pt stable at time of signout to incoming pending above.

## 2020-06-21 NOTE — ED ADULT NURSE REASSESSMENT NOTE - NS ED NURSE REASSESS COMMENT FT1
COVID swab collected and sent to lab. Pt aware of plan of care, pending cardiology consult. Pt denies pain/discomfort at this time.

## 2020-06-21 NOTE — ED PROVIDER NOTE - CARE PROVIDER_API CALL
Balaji Hobson  ADV HEART FAIL [facility]ARDIO  [address] [place] [address]  Phone: [phone]  Fax: [phone]  Established Patient  Follow Up Time: 4-6 Days

## 2020-06-21 NOTE — ED PROCEDURE NOTE - PROCEDURE ADDITIONAL DETAILS
Emergency Department Focused Ultrasound performed at patient's bedside for placement of ultrasound guided IV. The study was confirmed with blood return and ease of flushing saline

## 2020-06-21 NOTE — CONSULT NOTE ADULT - ASSESSMENT
76F hx of NICM w/ with improved EF of 45%, s/p CRT-D, severe asthma, AF on Eliquis, diverticulitis s/p Lupe's procedure with multiple RTOR for abd washouts and closed with strattice, here for discomfort at site of PPM, found to have elevated trop 95.    #left chest wall discomfort  -suspect 2/2 weight loss with PPM now being more superficial  -no fevers, normal WBC, PPM site c/d/i  -PPM in appropriate position on CXR/CT chest  -device interrogated, all parameters wnl  -follow up outpatient device clinic 6/26    #elevated troponin  -nonspecific in known cardiomyopathy  -non-cardiac chest discomfort described above  -no further cardiac workup needed    Discussed w/ Dr. Joce Marquez MD  Cardiology Fellow - PGY 4  Text or Call: [phone]  For all New Consults and Questions:  [email or web address]   Login: [email or web address]

## 2020-06-21 NOTE — CONSULT NOTE ADULT - SUBJECTIVE AND OBJECTIVE BOX
Patient seen and evaluated at bedside    Chief Complaint:  discomfort at site of PPM    HPI:  76F hx of NICM w/ with improved EF of 45%, s/p CRT-D, severe asthma, AF on Eliquis, diverticulitis s/p Lupe's procedure with multiple RTOR for abd washouts and closed with strattice, here for discomfort at PPM site.  Pt states she has lost a significant amount of weight, about 50 pounds since her Lupe's procedure 7/2019.  Since then, she reports increasing discomfort at site of PPM with movements.   Reports an unchanged chronic cough, but denies pleurisy, anginal pain, drainage at PPM site, CP, palpitations, LE edema, dyspnea, fevers.  No shocks per pt.    During ED stay, pt was noted to have a troponin of 93, peaked with 85. Pt had an unremarkable CTA chest, and CT chest/abd, largely unremarkable.        PMHx:   Refusal of blood transfusions as patient is Baptist  Patient is Baptist  Vertigo  Atrial flutter  Diverticulitis  Cardiomyopathy  Kidney stone  COPD (chronic obstructive pulmonary disease)  Cardiac Pacemaker  HTN - Hypertension  Gout  Diabetes  Congestive Heart Failure  Asthma      PSHx:   Status post left hemicolectomy  S/P cholecystectomy  AICD (Automatic Cardioverter/Defibrillator) Present  S/P Cholecystectomy      Allergies:  Coreg (Other)  digoxin (Other; Short breath (Mild to Mod))  metoprolol (Other)  penicillins (Hives)  Solu-Medrol (Other (Mild to Mod))      Home Meds: reviewed    Current Medications:      FAMILY HISTORY:  Family history of brain tumor      Social History:  Smoking History: denies  Alcohol Use: denies  Drug Use: denies    REVIEW OF SYSTEMS:  Constitutional:     [x ] negative [ ] fevers [ ] chills [ ] weight loss [ ] weight gain  HEENT:                  [x ] negative [ ] dry eyes [ ] eye irritation [ ] postnasal drip [ ] nasal congestion  CV:                         [ x] negative  [ ] chest pain [ ] orthopnea [ ] palpitations [ ] murmur  Resp:                     [x ] negative [ ] cough [ ] shortness of breath [ ] dyspnea [ ] wheezing [ ] sputum [ ]hemoptysis  GI:                          [ x] negative [ ] nausea [ ] vomiting [ ] diarrhea [ ] constipation [ ] abd pain [ ] dysphagia   :                        [ x] negative [ ] dysuria [ ] nocturia [ ] hematuria [ ] increased urinary frequency  Musculoskeletal: [x ] negative [ ] back pain [ ] myalgias [ ] arthralgias [ ] fracture  Skin:                       [ x] negative [ ] rash [ ] itch  Neurological:        [ x] negative [ ] headache [ ] dizziness [ ] syncope [ ] weakness [ ] numbness  Psychiatric:           [ x] negative [ ] anxiety [ ] depression  Endocrine:            [ x] negative [ ] diabetes [ ] thyroid problem  Heme/Lymph:      [ x] negative [ ] anemia [ ] bleeding problem  Allergic/Immune: [ x] negative [ ] itchy eyes [ ] nasal discharge [ ] hives [ ] angioedema    [ x] All other systems negative  [ ] Unable to assess ROS due to      Physical Exam:  T(F): 97.8 (06-21), Max: 98.6 (06-21)  HR: 80 (06-21) (78 - 98)  BP: 123/66 (06-21) (100/55 - 123/66)  RR: 16 (06-21)  SpO2: 94% (06-21)  GENERAL: No acute distress, well-developed  HEAD:  Atraumatic, Normocephalic  ENT: EOMI, PERRLA, conjunctiva and sclera clear, Neck supple, No JVD, moist mucosa  CHEST/LUNG: Clear to auscultation bilaterally; No wheeze, equal breath sounds bilaterally   BACK: No spinal tenderness  HEART: Regular rate and rhythm; No murmurs, rubs, or gallops  ABDOMEN: Soft, Nontender, Nondistended; Bowel sounds present  EXTREMITIES:  No clubbing, cyanosis, or edema  PSYCH: Nl behavior, nl affect  NEUROLOGY: AAOx3, non-focal, cranial nerves intact  SKIN: Normal color, No rashes or lesions  LINES:    Cardiovascular Diagnostic Testing:    ECG: Personally reviewed:  V-paced    Cath:  3/8/18  CORONARY VESSELS: The coronary circulation is right dominant.  LM:   --  LM: Normal.  LAD:   --  LAD: Normal.  CX:   --  Circumflex: Normal.  RCA:   --  RCA: Normal.      CXR: Personally reviewed    Labs: Personally reviewed                        10.5   7.87  )-----------( 209      ( 21 Jun 2020 10:45 )             32.9     06-21    141  |  101  |  22  ----------------------------<  162<H>  3.4<L>   |  28  |  1.03    Ca    9.2      21 Jun 2020 10:45    TPro  7.0  /  Alb  3.9  /  TBili  0.4  /  DBili  x   /  AST  18  /  ALT  8<L>  /  AlkPhos  85  06-21    PT/INR - ( 21 Jun 2020 10:45 )   PT: 15.4 sec;   INR: 1.34 ratio         PTT - ( 21 Jun 2020 10:45 )  PTT:31.6 sec

## 2020-06-26 ENCOUNTER — APPOINTMENT (OUTPATIENT)
Dept: ELECTROPHYSIOLOGY | Facility: CLINIC | Age: 75
End: 2020-06-26
Payer: MEDICARE

## 2020-06-26 ENCOUNTER — APPOINTMENT (OUTPATIENT)
Dept: HEART FAILURE | Facility: CLINIC | Age: 75
End: 2020-06-26
Payer: MEDICARE

## 2020-06-26 VITALS
DIASTOLIC BLOOD PRESSURE: 61 MMHG | HEIGHT: 62 IN | HEART RATE: 94 BPM | SYSTOLIC BLOOD PRESSURE: 98 MMHG | BODY MASS INDEX: 24.84 KG/M2 | RESPIRATION RATE: 12 BRPM | OXYGEN SATURATION: 98 % | TEMPERATURE: 98.2 F | WEIGHT: 135 LBS

## 2020-06-26 VITALS
OXYGEN SATURATION: 97 % | DIASTOLIC BLOOD PRESSURE: 67 MMHG | HEART RATE: 81 BPM | BODY MASS INDEX: 24.66 KG/M2 | HEIGHT: 62 IN | SYSTOLIC BLOOD PRESSURE: 137 MMHG | WEIGHT: 134 LBS

## 2020-06-26 PROCEDURE — 99214 OFFICE O/P EST MOD 30 MIN: CPT

## 2020-06-26 PROCEDURE — 93284 PRGRMG EVAL IMPLANTABLE DFB: CPT

## 2020-06-26 RX ORDER — BENZONATATE 100 MG/1
100 CAPSULE ORAL
Refills: 0 | Status: DISCONTINUED | COMMUNITY
End: 2020-06-26

## 2020-06-26 NOTE — ASSESSMENT
[FreeTextEntry1] : Ms. Dolan is a 75 year old woman with heart failure with mildly reduced ejection fraction, possibly related to hypertension. She is ACC/AHA stage C with NYHA class III symptoms. She is s/p CRT-D. She is currently limited by weakness and frailty. She had massive weight loss in the setting of acute illness following her perforated diverticulum. She is normotensive and euvolemic on exam.

## 2020-06-26 NOTE — HISTORY OF PRESENT ILLNESS
[FreeTextEntry1] : Ms Dolan is a 75 year old woman with an extensive medical history notable for asthma which she has had since childhood  and has not been well controlled, diabetes ,diverticulosis and HTN. She received a pacemaker many years ago, which was subsequently upgraded to CRT-D ( Medtronic) device in 2013.   An echocardiogram done May 1, 2016, revealed an EF of 35%, although repeat echocardiogram last year showed only mild decrease in her LV systolic function.  Her other comorbidities include a history of GI bleed and a history of respiratory failure leaded to tracheostomy and PEG in 2016. More recently she had a prolonged hospitalization due to a perforated diverticulum leading to an exploratory laparotomy, partial colectomy, end colostomy, and need for a temporary VAD abdominal wound dressing. Her course has been complicated by infections, but she has gradually improved. \par \par Currently, her primary complaint has been left sided upper lateral chest wall pain, which she associates with migration of her pacemaker battery. She was prescribed tramadol and notes improvement in her symptoms. She had an extensive workup in the ED for chest pain last week without notable findings. She does have chronically elevated troponin, consistent with increased risk for CV events in the future. Otherwise, she says she feels well. Her strength is improving. She is only able to walk short distances, largely due to weakness. She is not short of breath. She has no PND or orthopnea. She has no PND or orthopnea. She has no fevers or chills.

## 2020-06-26 NOTE — PHYSICAL EXAM
[Normal Conjunctiva] : the conjunctiva exhibited no abnormalities [Normal Oral Mucosa] : normal oral mucosa [No Oral Cyanosis] : no oral cyanosis [] : no respiratory distress [Respiration, Rhythm And Depth] : normal respiratory rhythm and effort [Heart Rate And Rhythm] : heart rate and rhythm were normal [Auscultation Breath Sounds / Voice Sounds] : lungs were clear to auscultation bilaterally [Arterial Pulses Normal] : the arterial pulses were normal [Heart Sounds] : normal S1 and S2 [Bowel Sounds] : normal bowel sounds [Abdomen Soft] : soft [Abnormal Walk] : normal gait [Nail Clubbing] : no clubbing of the fingernails [Cyanosis, Localized] : no localized cyanosis [Skin Color & Pigmentation] : normal skin color and pigmentation [Skin Turgor] : normal skin turgor [No Venous Stasis] : no venous stasis [Oriented To Time, Place, And Person] : oriented to person, place, and time [Impaired Insight] : insight and judgment were intact [No Anxiety] : not feeling anxious [FreeTextEntry1] : Very distant heart sounds. Bilateral lower extremity, pitting, 1+ bilaterally.

## 2020-06-26 NOTE — DISCUSSION/SUMMARY
[FreeTextEntry1] : 1) I will continue her current neurohormonal antagonists for her chronic systolic heart failure. She is not tolerant of beta-blockers.\par 2) She will continue Eliquis for atrial fibrillation. \par 3) I have encouraged her to increase her physical activity. \par 4) She will speak with EP later today about her chest wall pain, which may be related to her pacemaker generator. \par 5) Follow-up in two months.\par

## 2020-07-27 ENCOUNTER — APPOINTMENT (OUTPATIENT)
Dept: DISASTER EMERGENCY | Facility: CLINIC | Age: 75
End: 2020-07-27

## 2020-07-28 LAB — SARS-COV-2 N GENE NPH QL NAA+PROBE: NOT DETECTED

## 2020-07-29 ENCOUNTER — RESULT REVIEW (OUTPATIENT)
Age: 75
End: 2020-07-29

## 2020-07-29 ENCOUNTER — OUTPATIENT (OUTPATIENT)
Dept: OUTPATIENT SERVICES | Facility: HOSPITAL | Age: 75
LOS: 1 days | End: 2020-07-29
Payer: MEDICARE

## 2020-07-29 DIAGNOSIS — Z90.49 ACQUIRED ABSENCE OF OTHER SPECIFIED PARTS OF DIGESTIVE TRACT: Chronic | ICD-10-CM

## 2020-07-29 DIAGNOSIS — K63.2 FISTULA OF INTESTINE: ICD-10-CM

## 2020-07-29 PROCEDURE — 49424 ASSESS CYST CONTRAST INJECT: CPT

## 2020-07-29 PROCEDURE — C1887: CPT

## 2020-07-29 PROCEDURE — 76080 X-RAY EXAM OF FISTULA: CPT | Mod: 26

## 2020-07-29 PROCEDURE — C1769: CPT

## 2020-07-29 PROCEDURE — 76080 X-RAY EXAM OF FISTULA: CPT

## 2020-07-29 PROCEDURE — C1729: CPT

## 2020-07-31 ENCOUNTER — APPOINTMENT (OUTPATIENT)
Dept: SURGERY | Facility: CLINIC | Age: 75
End: 2020-07-31
Payer: MEDICARE

## 2020-07-31 VITALS
HEIGHT: 62 IN | BODY MASS INDEX: 24.66 KG/M2 | DIASTOLIC BLOOD PRESSURE: 69 MMHG | SYSTOLIC BLOOD PRESSURE: 111 MMHG | HEART RATE: 79 BPM | WEIGHT: 134 LBS | OXYGEN SATURATION: 97 % | RESPIRATION RATE: 16 BRPM | TEMPERATURE: 98.1 F

## 2020-07-31 DIAGNOSIS — K43.9 VENTRAL HERNIA W/OUT OBSTRUCTION OR GANGRENE: ICD-10-CM

## 2020-07-31 PROCEDURE — 99215 OFFICE O/P EST HI 40 MIN: CPT

## 2020-07-31 NOTE — PLAN
[FreeTextEntry1] : I discussed with the patient at length options for surgery.  Surgical intervention could be performed in order to resect the rectal stump and therefore address the fistula.  I would not recommend reversal of the colostomy, given her limited mobility and the fact that she is doing very well with colostomy care and has had no problems with it.  The ventral hernia would be a more complicated issue, and would likely require component separation, possible involvement of plastic surgery, and likely would need Botox injections prior to surgery.  We discussed the risks and benefits of these procedures.  The patient is very reluctant to undergo another major operation and would like to wait and see how things resolve with the fistula.  I have explained that this is reasonable, given that the fistula is well controlled, low output, and without associated skin infection or irritation.\par \par She will return to see me in 1 month and we will reassess things.

## 2020-07-31 NOTE — PHYSICAL EXAM
[de-identified] : Well-appearing, appears stated age, no acute distress, uses wheelchair [de-identified] : Normocephalic, sclerae anicteric, mucous membranes moist [de-identified] : Soft, nontender, nondistended.  Right upper quadrant colostomy appears normal with air in bag.  Midline ventral incisional hernia is soft, reducible, with no overlying skin changes.  At lower pole of old incision site, there are two-point openings corresponding to fistulas with some purulent drainage staining the gauze dressing.  The skin surrounding these areas is healthy and dry.  The left drain site is clean and dry with granuloma at the opening. [de-identified] : Normal respirations

## 2020-07-31 NOTE — HISTORY OF PRESENT ILLNESS
[de-identified] : Pretty Dolan is a 74 y/o female who underwent ex-lap and Lupe's procedure for perforated diverticulitis in July 2019 with abdomen left open, multiple washouts, then abdominal closure with vicryl mesh (fascia left open) and skin graft over wound. She subsequently developed a rectal stump leak, with left-sided abdominal fluid collection which was drained and fistula to inferior pole of wound.  \par \par Since her last visit, she had the left-sided drain removed by interventional radiology earlier this week.  They performed a drain study at the time which showed no continuity with abdominal fluid collections.  She states she continues to have some drainage from 2 fistula sites in the lower abdomen.  She has to change the dressing twice per day.  She has occasional lower abdominal pain that usually self resolves.  Her colostomy is functioning normally and she has no problems with it.  She feels well overall, is eating well, and reports no fevers, chills, or malaise.

## 2020-08-03 ENCOUNTER — APPOINTMENT (OUTPATIENT)
Dept: ELECTROPHYSIOLOGY | Facility: CLINIC | Age: 75
End: 2020-08-03
Payer: MEDICARE

## 2020-08-03 VITALS
SYSTOLIC BLOOD PRESSURE: 96 MMHG | TEMPERATURE: 97.3 F | WEIGHT: 134 LBS | HEART RATE: 82 BPM | BODY MASS INDEX: 24.66 KG/M2 | HEIGHT: 62 IN | DIASTOLIC BLOOD PRESSURE: 60 MMHG | OXYGEN SATURATION: 96 %

## 2020-08-03 PROCEDURE — 93284 PRGRMG EVAL IMPLANTABLE DFB: CPT

## 2020-08-06 DIAGNOSIS — Z46.82 ENCOUNTER FOR FITTING AND ADJUSTMENT OF NON-VASCULAR CATHETER: ICD-10-CM

## 2020-08-24 ENCOUNTER — EMERGENCY (EMERGENCY)
Facility: HOSPITAL | Age: 75
LOS: 1 days | Discharge: HOME | End: 2020-08-24
Attending: EMERGENCY MEDICINE
Payer: COMMERCIAL

## 2020-08-24 VITALS
TEMPERATURE: 98 F | RESPIRATION RATE: 18 BRPM | WEIGHT: 136.03 LBS | HEIGHT: 62 IN | SYSTOLIC BLOOD PRESSURE: 111 MMHG | OXYGEN SATURATION: 97 % | DIASTOLIC BLOOD PRESSURE: 74 MMHG | HEART RATE: 99 BPM

## 2020-08-24 VITALS
TEMPERATURE: 98 F | HEART RATE: 87 BPM | SYSTOLIC BLOOD PRESSURE: 115 MMHG | DIASTOLIC BLOOD PRESSURE: 66 MMHG | RESPIRATION RATE: 19 BRPM | OXYGEN SATURATION: 98 %

## 2020-08-24 DIAGNOSIS — Z90.49 ACQUIRED ABSENCE OF OTHER SPECIFIED PARTS OF DIGESTIVE TRACT: Chronic | ICD-10-CM

## 2020-08-24 PROCEDURE — 70450 CT HEAD/BRAIN W/O DYE: CPT | Mod: 26

## 2020-08-24 PROCEDURE — 71045 X-RAY EXAM CHEST 1 VIEW: CPT

## 2020-08-24 PROCEDURE — 99284 EMERGENCY DEPT VISIT MOD MDM: CPT

## 2020-08-24 PROCEDURE — 99284 EMERGENCY DEPT VISIT MOD MDM: CPT | Mod: 25

## 2020-08-24 PROCEDURE — 82962 GLUCOSE BLOOD TEST: CPT

## 2020-08-24 PROCEDURE — 71045 X-RAY EXAM CHEST 1 VIEW: CPT | Mod: 26

## 2020-08-24 PROCEDURE — 70450 CT HEAD/BRAIN W/O DYE: CPT

## 2020-08-24 NOTE — ED PROVIDER NOTE - PMH
Asthma    Atrial flutter    Cardiac Pacemaker    Cardiomyopathy    Congestive Heart Failure    Diabetes    Diverticulitis    Gout    HTN - Hypertension    Kidney stone    Patient is Anabaptist    Refusal of blood transfusions as patient is Anabaptist    Vertigo
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
## 2020-08-24 NOTE — ED PROVIDER NOTE - ATTENDING CONTRIBUTION TO CARE
Attending MD Crow:  I personally have seen and examined this patient.  Resident note reviewed and agree on plan of care and except where noted.  See HPI, PE, and MDM for details.
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
## 2020-08-24 NOTE — ED ADULT NURSE NOTE - NSIMPLEMENTINTERV_GEN_ALL_ED
Implemented All Fall Risk Interventions:  [place] to call system. Call bell, personal items and telephone within reach. Instruct patient to call for assistance. Room bathroom lighting operational. Non-slip footwear when patient is off stretcher. Physically safe environment: no spills, clutter or unnecessary equipment. Stretcher in lowest position, wheels locked, appropriate side rails in place. Provide visual cue, wrist band, yellow gown, etc. Monitor gait and stability. Monitor for mental status changes and reorient to person, place, and time. Review medications for side effects contributing to fall risk. Reinforce activity limits and safety measures with patient and family.
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
## 2020-08-24 NOTE — ED PROVIDER NOTE - PHYSICAL EXAMINATION
PHYSICAL EXAM:  GENERAL: Sitting comfortable in bed, in no acute distress  HENMT: Atraumatic, moist mucous membranes  EYES: Clear bilaterally, PERRL, EOMs intact b/l  HEART: irregularly irregular rhythm, nl S1/S2, no murmurs noted  RESPIRATORY: Clear to auscultation bilaterally, no wheezes/rhonchi/rales  ABDOMEN: +BS, soft, nontender, nondistended  EXTREMITIES: Full painless active ROM of all extremity joints, no lower extremity edema, +2 radial pulses b/l  NEURO:  A&Ox4, CN II-XII intact, no focal motor deficits or sensory deficits   Heme/LYMPH: No ecchymosis or bruising, no anterior/posterior cervical or supraclavicular LAD  SKIN:  Skin normal color for race, warm, dry and intact. No evidence of rash.
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
## 2020-08-24 NOTE — ED PROVIDER NOTE - PATIENT PORTAL LINK FT
You can access the FollowMyHealth Patient Portal offered by [facility] by registering at the following website: [email or web address]. By joining [facility]’s FollowMyHealth portal, you will also be able to view your health information using other applications (apps) compatible with our system.

## 2020-08-24 NOTE — ED PROVIDER NOTE - NS ED ROS FT
Constitutional: no fever and no chills  Eyes: no discharge, no irritation, no pain, no visual changes  ENMT: no ear pain or hearing loss, no dysphagia or throat pain  Neck: no pain, no stiffness, no swollen glands  CV: no chest pain, no palpitations, no edema  Resp: no cough, no shortness of breath  Abd: no abdominal pain, no nausea or vomiting, no diarrhea  : no dysuria, no hematuria  MSK: no back pain, no neck pain, no joint pain  Neuro: no LOC, no gait abnormality, no headache, no sensory deficits, no weakness  Skin: no rashes, no lacerations, no lesions
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
## 2020-08-28 ENCOUNTER — APPOINTMENT (OUTPATIENT)
Dept: SURGERY | Facility: CLINIC | Age: 75
End: 2020-08-28
Payer: MEDICARE

## 2020-08-28 VITALS
WEIGHT: 138 LBS | RESPIRATION RATE: 18 BRPM | DIASTOLIC BLOOD PRESSURE: 72 MMHG | HEIGHT: 62 IN | BODY MASS INDEX: 25.4 KG/M2 | HEART RATE: 91 BPM | SYSTOLIC BLOOD PRESSURE: 108 MMHG | TEMPERATURE: 98.1 F | OXYGEN SATURATION: 97 %

## 2020-08-28 PROCEDURE — 99213 OFFICE O/P EST LOW 20 MIN: CPT

## 2020-08-28 NOTE — PHYSICAL EXAM
[de-identified] : Well-appearing, appears stated age, no acute distress, uses wheelchair [de-identified] : Normocephalic, sclerae anicteric, mucous membranes moist [de-identified] : Normal respirations [de-identified] : Soft, nontender, nondistended.  Right upper quadrant colostomy appears normal with stool in bag.  Midline ventral incisional hernia is soft, reducible, with no overlying skin changes.  At lower pole of old incision site, there are two-point openings with skin granulation corresponding to fistulas with some purulent drainage staining the gauze dressing.  The skin surrounding these areas is healthy and dry.

## 2020-08-28 NOTE — PLAN
[FreeTextEntry1] : Silver nitrate applied to skin granulomas at fistula sites\par Continue local wound care\par F/u 2 weeks for repeat silver nitrate application

## 2020-08-28 NOTE — ASSESSMENT
[FreeTextEntry1] : 74F s/p ex-lap and Lupe's procedure for perforated diverticulitis in July 2019 with abdomen left open, multiple washouts, then abdominal closure with vicryl mesh (fascia left open) and skin graft over wound.  She subsequently developed a rectal stump leak, with persistent low output colocutaneous fistula which is well controlled.\par

## 2020-08-28 NOTE — HISTORY OF PRESENT ILLNESS
[de-identified] : Pretty Dolan is a 74 y/o female who underwent ex-lap and Lupe's procedure for perforated diverticulitis in July 2019 with abdomen left open, multiple washouts, then abdominal closure with vicryl mesh (fascia left open) and skin graft over wound.  This was complicated by a rectal stump leak with resultant colocutaneous fistula.  This was drained and the patient has slowly seen less output from the inferior abdominal fistula.  She reports no skin changes, fevers, or malaise.  She is tolerating diet and having normal colostomy function.  At her last visit, we discussed possibility of colostomy reversal and complex abdominal hernia repair.  She agrees that this would be too risky of an undertaking and she is willing to live with a colostomy for the time being.\par \par \par

## 2020-08-31 NOTE — ED PROVIDER NOTE - TRANSFER CONSULTATION #1
Pt is calling [facility] Needs to be done today. will see patient in ED

## 2020-09-11 ENCOUNTER — APPOINTMENT (OUTPATIENT)
Dept: SURGERY | Facility: CLINIC | Age: 75
End: 2020-09-11
Payer: MEDICARE

## 2020-09-11 VITALS
BODY MASS INDEX: 25.4 KG/M2 | WEIGHT: 138 LBS | HEIGHT: 62 IN | DIASTOLIC BLOOD PRESSURE: 73 MMHG | HEART RATE: 91 BPM | OXYGEN SATURATION: 96 % | SYSTOLIC BLOOD PRESSURE: 114 MMHG | TEMPERATURE: 97.4 F | RESPIRATION RATE: 18 BRPM

## 2020-09-11 PROCEDURE — 99213 OFFICE O/P EST LOW 20 MIN: CPT

## 2020-09-11 NOTE — PHYSICAL EXAM
[de-identified] : Well-appearing, appears stated age, no acute distress, uses wheelchair [de-identified] : Normocephalic, sclerae anicteric, mucous membranes moist [de-identified] : Normal respirations [de-identified] : Soft, nontender, nondistended.  Right upper quadrant colostomy appears normal with stool in bag.  Midline ventral incisional hernia is soft, reducible, with no overlying skin changes.  At lower pole of old incision site, there are two-point openings with skin granulation corresponding to fistulas with some purulent drainage and small amount of blood staining the gauze dressing.  The skin surrounding these areas is healthy and dry.

## 2020-09-11 NOTE — ASSESSMENT
[FreeTextEntry1] : 74F s/p ex-lap and Lupe's procedure for perforated diverticulitis in July 2019 with abdomen left open, multiple washouts, then abdominal closure with vicryl mesh (fascia left open) and skin graft over wound.  She subsequently developed a rectal stump leak, with persistent low output colocutaneous fistula which is well controlled.\par \par She has a small amount of blood staining at the fistula sites due to granulomas.\par

## 2020-09-11 NOTE — PLAN
[FreeTextEntry1] : Silver nitrate applied to fistula sites.  Wounds covered with dry gauze.  She was instructed to continue dressing changes as she has been doing.  The Eliquis should not be stopped.  Advised to monitor for continued bleeding.  She will follow-up in 1 week.

## 2020-09-11 NOTE — HISTORY OF PRESENT ILLNESS
[de-identified] : Pretty Dolan is a 74 y/o female who underwent ex-lap and Lupe's procedure for perforated diverticulitis in July 2019 with abdomen left open, multiple washouts, then abdominal closure with vicryl mesh (fascia left open) and skin graft over wound. This was complicated by a rectal stump leak with resultant colocutaneous fistula. \par \par The patient returns today for wound check.  The last several days she had noted some bleeding from her fistula sites.  She has gauze over the fistula sites which are being changed twice per day.  The blood is staining the gauze but not soaking through.  She is otherwise feeling well, reports no fevers, chills, malaise, dizziness or lightheadedness.

## 2020-09-18 ENCOUNTER — APPOINTMENT (OUTPATIENT)
Dept: SURGERY | Facility: CLINIC | Age: 75
End: 2020-09-18
Payer: MEDICARE

## 2020-09-18 VITALS
RESPIRATION RATE: 17 BRPM | TEMPERATURE: 98.1 F | HEIGHT: 62 IN | WEIGHT: 138 LBS | HEART RATE: 89 BPM | SYSTOLIC BLOOD PRESSURE: 129 MMHG | BODY MASS INDEX: 25.4 KG/M2 | DIASTOLIC BLOOD PRESSURE: 81 MMHG | OXYGEN SATURATION: 97 %

## 2020-09-18 PROCEDURE — 99213 OFFICE O/P EST LOW 20 MIN: CPT

## 2020-09-18 NOTE — PLAN
[FreeTextEntry1] : Silver nitrate applied to granulating tissue at fistula openings.\par Dressing was changed.\par Follow-up 1 week

## 2020-09-18 NOTE — PHYSICAL EXAM
[de-identified] : Well-appearing, appears stated age, no acute distress, uses wheelchair [de-identified] : Normocephalic, sclerae anicteric, mucous membranes moist [de-identified] : Normal respirations [de-identified] : Soft, nontender, nondistended.  Right upper quadrant colostomy appears normal with stool in bag.  Midline ventral incisional hernia is soft, reducible, with no overlying skin changes.  At lower pole of old incision site, there are two-point openings with skin granulation corresponding to fistulas with some purulent drainage  No blood.  The skin surrounding these areas is healthy and dry.

## 2020-09-18 NOTE — HISTORY OF PRESENT ILLNESS
[de-identified] : Pretty Dolan is a 74 y/o female who underwent ex-lap and Lupe's procedure for perforated diverticulitis in July 2019 with abdomen left open, multiple washouts, then abdominal closure with vicryl mesh (fascia left open) and skin graft over wound. This was complicated by a rectal stump leak with resultant colocutaneous fistula. \par \par She returns for evaluation of fistula.  Reports no further bleeding from the fistula openings.  There is small drainage from the fistula site requiring dressing changes twice per day.  Her ostomy is functioning normally.  She reports no chills, fevers, or malaise.  She is tolerating regular diet.\par

## 2020-09-18 NOTE — ASSESSMENT
[FreeTextEntry1] : 74F s/p ex-lap and Lupe's procedure for perforated diverticulitis in July 2019 with abdomen left open, multiple washouts, then abdominal closure with vicryl mesh (fascia left open) and skin graft over wound.  She subsequently developed a rectal stump leak, with persistent low output colocutaneous fistula which is well controlled.\par \par \par

## 2020-09-23 RX ORDER — TRAMADOL HYDROCHLORIDE 50 MG/1
50 TABLET, COATED ORAL
Refills: 0 | Status: DISCONTINUED | COMMUNITY
Start: 2020-06-26 | End: 2020-09-23

## 2020-09-24 NOTE — PATIENT PROFILE ADULT - NSASFUNCLEVELADLAMBULATE_GEN_A_NUR
Nettie Araya is requesting a refill of losartan (COZAAR) 100 MG tablet for a 90 day supply + refills and would like   [facility] - [place], [place] - [address] AT [address] to [place] [address]  [facility] [address]  Phone: [phone] Fax: [phone]  .     Ok to leave a detailed message on voicemail Yes   2 = assistive person

## 2020-09-25 ENCOUNTER — APPOINTMENT (OUTPATIENT)
Dept: SURGERY | Facility: CLINIC | Age: 75
End: 2020-09-25
Payer: MEDICARE

## 2020-09-25 ENCOUNTER — APPOINTMENT (OUTPATIENT)
Dept: HEART FAILURE | Facility: CLINIC | Age: 75
End: 2020-09-25
Payer: MEDICARE

## 2020-09-25 VITALS
BODY MASS INDEX: 26.13 KG/M2 | WEIGHT: 142 LBS | OXYGEN SATURATION: 96 % | HEIGHT: 62 IN | TEMPERATURE: 98.1 F | RESPIRATION RATE: 12 BRPM | SYSTOLIC BLOOD PRESSURE: 99 MMHG | DIASTOLIC BLOOD PRESSURE: 64 MMHG | HEART RATE: 82 BPM

## 2020-09-25 VITALS
RESPIRATION RATE: 17 BRPM | TEMPERATURE: 97.2 F | SYSTOLIC BLOOD PRESSURE: 103 MMHG | DIASTOLIC BLOOD PRESSURE: 66 MMHG | OXYGEN SATURATION: 98 % | HEART RATE: 96 BPM

## 2020-09-25 DIAGNOSIS — Z01.818 ENCOUNTER FOR OTHER PREPROCEDURAL EXAMINATION: ICD-10-CM

## 2020-09-25 DIAGNOSIS — E78.5 HYPERLIPIDEMIA, UNSPECIFIED: ICD-10-CM

## 2020-09-25 PROCEDURE — 99214 OFFICE O/P EST MOD 30 MIN: CPT

## 2020-09-25 PROCEDURE — 99213 OFFICE O/P EST LOW 20 MIN: CPT

## 2020-09-25 NOTE — HISTORY OF PRESENT ILLNESS
[de-identified] : Pretty Dolan is a 74 y/o female who underwent ex-lap and Lupe's procedure for perforated diverticulitis in July 2019 with abdomen left open, multiple washouts, then abdominal closure with vicryl mesh (fascia left open) and skin graft over wound. This was complicated by a rectal stump leak with resultant colocutaneous fistula. \par \par The patient returns today for wound check.  We have been applying silver nitrate to 2 areas of granulation tissue at the 2 fistula sites.  She has been doing well.  She has noted that the fistula drainage has decreased since last visit.  She has not noticed any further bleeding.

## 2020-09-25 NOTE — ASSESSMENT
[FreeTextEntry1] : Ms. Dolan is a 75 year old woman with heart failure with mildly reduced ejection fraction, possibly related to hypertension. She is ACC/AHA stage C with NYHA class III symptoms. She is s/p CRT-D. She is currently limited by dyspnea, but without evidence of volume expansion. Her dyspnea is associated with wheezing, which may be explained by her severe asthma. She is not tolerant of beta-blockers. She had massive weight loss in the setting of acute illness following her perforated diverticulum, but is now starting to gain weight. She is euvolemic on exam but relatively hypotensive, without dizziness.

## 2020-09-25 NOTE — DISCUSSION/SUMMARY
[FreeTextEntry1] : 1) I will continue her current neurohormonal antagonists for her chronic systolic heart failure. She is not tolerant of beta-blockers. I will repeat an echo as it has been over a year since her last study. \par 2) She will continue Eliquis for atrial fibrillation. \par 3) I have encouraged her to increase her physical activity. \par 4) Follow-up in three months.\par

## 2020-09-25 NOTE — PHYSICAL EXAM
[de-identified] : Well-appearing, appears stated age, no acute distress, uses wheelchair [de-identified] : Normocephalic, sclerae anicteric, mucous membranes moist [de-identified] : Normal respirations [de-identified] : Soft, nontender, nondistended.  Right upper quadrant colostomy appears normal with stool in bag.  Midline ventral incisional hernia is soft, reducible, with no overlying skin changes.  At lower pole of old incision site, there are two-point openings with skin granulation corresponding to fistulas with some purulent drainage  No blood.  The skin surrounding these areas is healthy and dry.

## 2020-09-25 NOTE — PHYSICAL EXAM
[Normal Conjunctiva] : the conjunctiva exhibited no abnormalities [Normal Oral Mucosa] : normal oral mucosa [No Oral Cyanosis] : no oral cyanosis [] : no respiratory distress [Respiration, Rhythm And Depth] : normal respiratory rhythm and effort [Auscultation Breath Sounds / Voice Sounds] : lungs were clear to auscultation bilaterally [Heart Rate And Rhythm] : heart rate and rhythm were normal [Heart Sounds] : normal S1 and S2 [Arterial Pulses Normal] : the arterial pulses were normal [Bowel Sounds] : normal bowel sounds [Abdomen Soft] : soft [Abnormal Walk] : normal gait [Nail Clubbing] : no clubbing of the fingernails [Cyanosis, Localized] : no localized cyanosis [Skin Color & Pigmentation] : normal skin color and pigmentation [Skin Turgor] : normal skin turgor [No Venous Stasis] : no venous stasis [Oriented To Time, Place, And Person] : oriented to person, place, and time [Impaired Insight] : insight and judgment were intact [No Anxiety] : not feeling anxious [Edema] : no peripheral edema present [FreeTextEntry1] : Very distant heart sounds.

## 2020-09-25 NOTE — HISTORY OF PRESENT ILLNESS
[FreeTextEntry1] : Ms Dolan is a 75 year old woman with an extensive medical history notable for asthma which she has had since childhood  and has not been well controlled, diabetes ,diverticulosis and HTN. She received a pacemaker many years ago, which was subsequently upgraded to CRT-D ( [employer]) device in 2013.   An echocardiogram done May 1, 2016, revealed an EF of 35%, although repeat echocardiogram last year showed only mild decrease in her LV systolic function.  Her other comorbidities include a history of GI bleed and a history of respiratory failure leaded to tracheostomy and PEG in 2016. More recently she had a prolonged hospitalization due to a perforated diverticulum leading to an exploratory laparotomy, partial colectomy, end colostomy, and need for a temporary VAD abdominal wound dressing. Her course has been complicated by infections, but she has gradually improved. \par \par Since her last clinic visit, she has had gradual improvement in her strength and overall functional capacity. She is limited by dyspnea on exertion, but with associated wheezing, which she ascribes to her asthma. She has chronically elevated troponin, consistent with increased risk for CV events in the future. She has no PND or orthopnea. She has no PND or orthopnea. She has no fevers or chills. Her appetite has improved and she is gradually increasing in weight.

## 2020-09-25 NOTE — PLAN
[FreeTextEntry1] : Follow-up 1 week for wound check\par Continue local wound care as she has been doing.

## 2020-10-02 ENCOUNTER — APPOINTMENT (OUTPATIENT)
Dept: SURGERY | Facility: CLINIC | Age: 75
End: 2020-10-02
Payer: MEDICARE

## 2020-10-02 VITALS
OXYGEN SATURATION: 98 % | TEMPERATURE: 97 F | RESPIRATION RATE: 16 BRPM | SYSTOLIC BLOOD PRESSURE: 112 MMHG | HEART RATE: 93 BPM | DIASTOLIC BLOOD PRESSURE: 73 MMHG

## 2020-10-02 PROCEDURE — 99213 OFFICE O/P EST LOW 20 MIN: CPT

## 2020-10-02 NOTE — PLAN
[FreeTextEntry1] : Continue to monitor drainage from fistulas.  Continue twice daily dressing changes, or more frequently as needed.\par \par Follow-up 2 weeks\par \par If drainage is persistently increasing, will need repeat CT scan with rectal contrast to evaluate fistulas.

## 2020-10-02 NOTE — PHYSICAL EXAM
[de-identified] : Well-appearing, appears stated age, no acute distress, uses wheelchair [de-identified] : Normocephalic, sclerae anicteric, mucous membranes moist [de-identified] : Normal respirations [de-identified] : Soft, nontender, nondistended.  Right upper quadrant colostomy appears normal with stool in bag.  Midline ventral incisional hernia is soft, reducible, with no overlying skin changes.  At lower pole of old incision site, there are two-point openings with skin granulation corresponding to fistulas with some purulent drainage  No blood.  The skin surrounding these areas is healthy and dry.

## 2020-10-02 NOTE — HISTORY OF PRESENT ILLNESS
[de-identified] : Pretty Dolan is a 74 y/o female who underwent ex-lap and Lupe's procedure for perforated diverticulitis in July 2019 with abdomen left open, multiple washouts, then abdominal closure with Vicryl mesh (fascia left open) and skin graft over wound. This was complicated by a rectal stump leak with resultant colocutaneous fistula. \par \par The patient returns today for wound check. We have been applying silver nitrate to 2 areas of granulation tissue at the 2 fistula sites. She has been doing well.\par The patient reports an increase in drainage with a reddish/brown color. Denies pain. Denies fever/chills. Tolerating a regular diet in the absence of nausea/vomiting.

## 2020-10-02 NOTE — ASSESSMENT
[FreeTextEntry1] : 74F s/p ex-lap and Lupe's procedure for perforated diverticulitis in July 2019 with abdomen left open, multiple washouts, then abdominal closure with vicryl mesh (fascia left open) and skin graft over wound.  She subsequently developed a rectal stump leak, with persistent low output colocutaneous fistula which is well controlled.\par \par \par \par \par

## 2020-10-16 ENCOUNTER — APPOINTMENT (OUTPATIENT)
Dept: SURGERY | Facility: CLINIC | Age: 75
End: 2020-10-16
Payer: MEDICARE

## 2020-10-16 VITALS
WEIGHT: 147 LBS | SYSTOLIC BLOOD PRESSURE: 128 MMHG | DIASTOLIC BLOOD PRESSURE: 68 MMHG | BODY MASS INDEX: 27.05 KG/M2 | HEART RATE: 94 BPM | HEIGHT: 62 IN | TEMPERATURE: 98.2 F | OXYGEN SATURATION: 100 % | RESPIRATION RATE: 18 BRPM

## 2020-10-16 PROCEDURE — 99213 OFFICE O/P EST LOW 20 MIN: CPT

## 2020-10-16 NOTE — ASSESSMENT
[FreeTextEntry1] : 74F s/p ex-lap and Lupe's procedure for perforated diverticulitis in July 2019 with abdomen left open, multiple washouts, then abdominal closure with vicryl mesh (fascia left open) and skin graft over wound.  She subsequently developed a rectal stump leak, with persistent low output colocutaneous fistula which is well controlled.  No evidence of infection or skin breakdown.\par \par Silver nitrate applied to fistula site granulation tissue.\par \par \par \par

## 2020-10-16 NOTE — PLAN
[FreeTextEntry1] : Cont dressing changes BID\par We have discussed surgery previously, which would involve a large, complicate procedure to take down fistulas, potentially reverse colostomy, and also involve complex abdominal wall reconstruction, and she declined.\par F/u 1-2 weeks

## 2020-10-16 NOTE — HISTORY OF PRESENT ILLNESS
[de-identified] : Pretty Dolan is a 76 y/o female who underwent ex-lap and Lupe's procedure for perforated diverticulitis in July 2019 with abdomen left open, multiple washouts, then abdominal closure with Vicryl mesh (fascia left open) and skin graft over wound. This was complicated by a rectal stump leak with resultant colocutaneous fistula. \par She has been following up routinely for wound checks every 1 to 2 weeks.  She has 2 remaining small fistula sites with low volume drainage.\par \par The patient is here with her daughter, who report stable drainage from the fistula sites.  She requires dressing changes twice a day, which is unchanged from prior.  She reports no pain, no fevers, no chills.  She has excellent appetite and is eating well.\par

## 2020-10-16 NOTE — PHYSICAL EXAM
[de-identified] : Normocephalic, sclerae anicteric, mucous membranes moist [de-identified] : Normal respirations [de-identified] : Well-appearing, appears stated age, no acute distress, uses wheelchair [de-identified] : Soft, nontender, nondistended.  Right upper quadrant colostomy appears normal with stool in bag.  Midline ventral incisional hernia is soft, reducible, with no overlying skin changes.  At lower pole of old incision site, there are two-point openings with skin granulation corresponding to fistulas with some purulent drainage  No blood.  The skin surrounding these areas is healthy and dry.

## 2020-10-30 ENCOUNTER — APPOINTMENT (OUTPATIENT)
Dept: SURGERY | Facility: CLINIC | Age: 75
End: 2020-10-30
Payer: MEDICARE

## 2020-10-30 VITALS
DIASTOLIC BLOOD PRESSURE: 60 MMHG | HEART RATE: 76 BPM | OXYGEN SATURATION: 99 % | SYSTOLIC BLOOD PRESSURE: 115 MMHG | RESPIRATION RATE: 16 BRPM | TEMPERATURE: 97.3 F

## 2020-10-30 PROCEDURE — 99072 ADDL SUPL MATRL&STAF TM PHE: CPT

## 2020-10-30 PROCEDURE — 99213 OFFICE O/P EST LOW 20 MIN: CPT

## 2020-10-30 NOTE — HISTORY OF PRESENT ILLNESS
[de-identified] : Pretty Dolan is a 76 y/o female who underwent ex-lap and Lupe's procedure for perforated diverticulitis in July 2019 with abdomen left open, multiple washouts, then abdominal closure with Vicryl mesh (fascia left open) and skin graft over wound. This was complicated by a rectal stump leak with resultant colocutaneous fistula. \par The fistula has been low output and has been managed expectantly.\par \par The patient states she has been doing well.  She has no pain.  She is still having dressing changes twice per day and has not noted any increase in output.  She has occasional oozing from the sites, however she is on blood thinners.

## 2020-10-30 NOTE — PLAN
[FreeTextEntry1] : Continue daily dressing changes.\par \par She will follow-up with me in 2 weeks.\par \par We have again discussed the option of surgery, however she does not want to go through big operation with the attendant risks.  Given the fistula is low output and easily manageable and her surrounding skin appears very healthy, expectant management is acceptable.

## 2020-10-30 NOTE — PHYSICAL EXAM
[de-identified] : Well-appearing, appears stated age, no acute distress, uses wheelchair [de-identified] : Normocephalic, sclerae anicteric, mucous membranes moist [de-identified] : Normal respirations [de-identified] : Soft, nontender, nondistended.  Right upper quadrant colostomy appears normal with stool in bag.  Midline ventral incisional hernia is soft, reducible, with no overlying skin changes.  At lower pole of old incision site, there are two-point openings with skin granulation corresponding to fistulas with some purulent drainage  No blood.  The skin surrounding these areas is healthy and dry.

## 2020-11-03 ENCOUNTER — APPOINTMENT (OUTPATIENT)
Dept: ELECTROPHYSIOLOGY | Facility: CLINIC | Age: 75
End: 2020-11-03
Payer: MEDICARE

## 2020-11-03 PROCEDURE — 93296 REM INTERROG EVL PM/IDS: CPT

## 2020-11-03 PROCEDURE — 93295 DEV INTERROG REMOTE 1/2/MLT: CPT

## 2020-11-12 NOTE — ED PROVIDER NOTE - MUSCULOSKELETAL, MLM
Quality 110: Preventive Care And Screening: Influenza Immunization: Influenza Immunization previously received during influenza season Quality 226: Preventive Care And Screening: Tobacco Use: Screening And Cessation Intervention: Patient screened for tobacco use and is an ex/non-smoker Spine appears normal, range of motion is not limited, no muscle or joint tenderness Quality 111:Pneumonia Vaccination Status For Older Adults: Pneumococcal Vaccination not Administered or Previously Received, Reason not Otherwise Specified Quality 130: Documentation Of Current Medications In The Medical Record: Current Medications Documented Quality 397: Melanoma: Reporting: The pathology report includes a pT Category and statement on thickness and ulceration for pT1, mitotic rate. Quality 137: Melanoma: Continuity Of Care - Recall System: Patient information entered into a recall system that includes: target date for the next exam specified AND a process to follow up with patients regarding missed or unscheduled appointments Quality 138: Melanoma: Coordination Of Care: A treatment plan was communicated to the physicians providing continuing care within one month of diagnosis outlining: diagnosis, tumor thickness and a plan for surgery or alternate care. Quality 431: Preventive Care And Screening: Unhealthy Alcohol Use - Screening: Patient screened for unhealthy alcohol use using a single question and scores less than 2 times per year Detail Level: Detailed

## 2020-11-20 ENCOUNTER — APPOINTMENT (OUTPATIENT)
Dept: SURGERY | Facility: CLINIC | Age: 75
End: 2020-11-20
Payer: MEDICARE

## 2020-11-20 VITALS
RESPIRATION RATE: 16 BRPM | OXYGEN SATURATION: 98 % | SYSTOLIC BLOOD PRESSURE: 127 MMHG | HEIGHT: 62 IN | DIASTOLIC BLOOD PRESSURE: 79 MMHG | TEMPERATURE: 97.4 F | HEART RATE: 70 BPM

## 2020-11-20 PROCEDURE — 99213 OFFICE O/P EST LOW 20 MIN: CPT

## 2020-11-20 NOTE — PHYSICAL EXAM
[de-identified] : Well-appearing, appears stated age, no acute distress, uses wheelchair [de-identified] : Normocephalic, sclerae anicteric, mucous membranes moist [de-identified] : Normal respirations [de-identified] : Soft, nontender, nondistended.  Right upper quadrant colostomy appears normal with stool in bag.  Midline ventral incisional hernia is soft, reducible, with no overlying skin changes.  At lower pole of old incision site, there are two-point openings with skin granulation corresponding to fistulas with scant drainage  The skin surrounding these areas is healthy and dry.

## 2020-11-20 NOTE — PLAN
[FreeTextEntry1] : Continue local wound care\par Recommended simethicone as needed for bloating\par Referred to Dr. Rdz and Dr. Che for cardiology follow-up\par Follow-up with me 1 month

## 2020-11-20 NOTE — HISTORY OF PRESENT ILLNESS
[de-identified] : Pretty Dolan is a 76 y/o female who underwent ex-lap and Lupe's procedure for perforated diverticulitis in July 2019 with abdomen left open, multiple washouts, then abdominal closure with Vicryl mesh (fascia left open) and skin graft over wound. This was complicated by a rectal stump leak with resultant colocutaneous fistula. \par \par The patient returns today for routine follow-up.  2 weeks ago, she experienced worsening of her COPD with URI and was started on a short course of prednisone and antibiotics.  She is scheduled to finish the prednisone today.  She noted a marked increase in her appetite, has been eating much more, and has gained weight these last 2 weeks.  She also noted some increased bloating and more gas in her ostomy bag.  She states she is having normal stools via the ostomy bag.  She reports no fevers, chills, or abdominal pain.  She overall feels well, certainly much better than she did 2 weeks ago and noted that she is breathing much better.  She noted that her cardiologist is retiring and she is having difficulty finding another cardiologist.\par \par She noticed diminished output from her fistula sites.\par

## 2020-11-20 NOTE — ASSESSMENT
[FreeTextEntry1] : 74F s/p ex-lap and Lupe's procedure for perforated diverticulitis in July 2019 with abdomen left open, multiple washouts, then abdominal closure with vicryl mesh (fascia left open) and skin graft over wound.  She subsequently developed a rectal stump leak, with persistent low output colocutaneous fistula which is well controlled.  No evidence of infection or skin breakdown.  She has diminished output from the fistula sites over the last 2 weeks.\par \par \par \par

## 2020-11-27 ENCOUNTER — APPOINTMENT (OUTPATIENT)
Dept: ELECTROPHYSIOLOGY | Facility: CLINIC | Age: 75
End: 2020-11-27
Payer: MEDICARE

## 2020-11-27 PROCEDURE — 93296 REM INTERROG EVL PM/IDS: CPT

## 2020-11-27 PROCEDURE — 93295 DEV INTERROG REMOTE 1/2/MLT: CPT

## 2020-11-29 ENCOUNTER — EMERGENCY (EMERGENCY)
Facility: HOSPITAL | Age: 75
LOS: 1 days | Discharge: HOME | End: 2020-11-29
Attending: EMERGENCY MEDICINE
Payer: MEDICARE

## 2020-11-29 VITALS
TEMPERATURE: 98 F | OXYGEN SATURATION: 99 % | HEART RATE: 70 BPM | DIASTOLIC BLOOD PRESSURE: 60 MMHG | RESPIRATION RATE: 16 BRPM | SYSTOLIC BLOOD PRESSURE: 116 MMHG

## 2020-11-29 VITALS
RESPIRATION RATE: 18 BRPM | TEMPERATURE: 98 F | HEIGHT: 62 IN | HEART RATE: 91 BPM | WEIGHT: 149.91 LBS | SYSTOLIC BLOOD PRESSURE: 119 MMHG | OXYGEN SATURATION: 96 % | DIASTOLIC BLOOD PRESSURE: 69 MMHG

## 2020-11-29 DIAGNOSIS — Z90.49 ACQUIRED ABSENCE OF OTHER SPECIFIED PARTS OF DIGESTIVE TRACT: Chronic | ICD-10-CM

## 2020-11-29 LAB
ALBUMIN SERPL ELPH-MCNC: 3.9 G/DL — SIGNIFICANT CHANGE UP (ref 3.3–5)
ALP SERPL-CCNC: 116 U/L — SIGNIFICANT CHANGE UP (ref 40–120)
ALT FLD-CCNC: 23 U/L — SIGNIFICANT CHANGE UP (ref 10–45)
ANION GAP SERPL CALC-SCNC: 11 MMOL/L — SIGNIFICANT CHANGE UP (ref 5–17)
APPEARANCE UR: ABNORMAL
APTT BLD: 32.8 SEC — SIGNIFICANT CHANGE UP (ref 27.5–35.5)
AST SERPL-CCNC: 20 U/L — SIGNIFICANT CHANGE UP (ref 10–40)
BASOPHILS # BLD AUTO: 0 K/UL — SIGNIFICANT CHANGE UP (ref 0–0.2)
BASOPHILS NFR BLD AUTO: 0 % — SIGNIFICANT CHANGE UP (ref 0–2)
BILIRUB SERPL-MCNC: 0.7 MG/DL — SIGNIFICANT CHANGE UP (ref 0.2–1.2)
BILIRUB UR-MCNC: NEGATIVE — SIGNIFICANT CHANGE UP
BUN SERPL-MCNC: 22 MG/DL — SIGNIFICANT CHANGE UP (ref 7–23)
CALCIUM SERPL-MCNC: 9.2 MG/DL — SIGNIFICANT CHANGE UP (ref 8.4–10.5)
CHLORIDE SERPL-SCNC: 99 MMOL/L — SIGNIFICANT CHANGE UP (ref 96–108)
CO2 SERPL-SCNC: 30 MMOL/L — SIGNIFICANT CHANGE UP (ref 22–31)
COLOR SPEC: SIGNIFICANT CHANGE UP
CREAT SERPL-MCNC: 0.99 MG/DL — SIGNIFICANT CHANGE UP (ref 0.5–1.3)
DIFF PNL FLD: ABNORMAL
EOSINOPHIL # BLD AUTO: 0.09 K/UL — SIGNIFICANT CHANGE UP (ref 0–0.5)
EOSINOPHIL NFR BLD AUTO: 1 % — SIGNIFICANT CHANGE UP (ref 0–6)
GAS PNL BLDV: SIGNIFICANT CHANGE UP
GLUCOSE SERPL-MCNC: 68 MG/DL — LOW (ref 70–99)
GLUCOSE UR QL: NEGATIVE — SIGNIFICANT CHANGE UP
HCT VFR BLD CALC: 33.7 % — LOW (ref 34.5–45)
HGB BLD-MCNC: 11.2 G/DL — LOW (ref 11.5–15.5)
INR BLD: 1.44 RATIO — HIGH (ref 0.88–1.16)
KETONES UR-MCNC: NEGATIVE — SIGNIFICANT CHANGE UP
LEUKOCYTE ESTERASE UR-ACNC: NEGATIVE — SIGNIFICANT CHANGE UP
LYMPHOCYTES # BLD AUTO: 1.75 K/UL — SIGNIFICANT CHANGE UP (ref 1–3.3)
LYMPHOCYTES # BLD AUTO: 20 % — SIGNIFICANT CHANGE UP (ref 13–44)
MCHC RBC-ENTMCNC: 32.1 PG — SIGNIFICANT CHANGE UP (ref 27–34)
MCHC RBC-ENTMCNC: 33.2 GM/DL — SIGNIFICANT CHANGE UP (ref 32–36)
MCV RBC AUTO: 96.6 FL — SIGNIFICANT CHANGE UP (ref 80–100)
MONOCYTES # BLD AUTO: 0.79 K/UL — SIGNIFICANT CHANGE UP (ref 0–0.9)
MONOCYTES NFR BLD AUTO: 9 % — SIGNIFICANT CHANGE UP (ref 2–14)
NEUTROPHILS # BLD AUTO: 5.67 K/UL — SIGNIFICANT CHANGE UP (ref 1.8–7.4)
NEUTROPHILS NFR BLD AUTO: 59 % — SIGNIFICANT CHANGE UP (ref 43–77)
NITRITE UR-MCNC: NEGATIVE — SIGNIFICANT CHANGE UP
PH UR: 6 — SIGNIFICANT CHANGE UP (ref 5–8)
PLATELET # BLD AUTO: 135 K/UL — LOW (ref 150–400)
POTASSIUM SERPL-MCNC: 3.6 MMOL/L — SIGNIFICANT CHANGE UP (ref 3.5–5.3)
POTASSIUM SERPL-SCNC: 3.6 MMOL/L — SIGNIFICANT CHANGE UP (ref 3.5–5.3)
PROT SERPL-MCNC: 6.7 G/DL — SIGNIFICANT CHANGE UP (ref 6–8.3)
PROT UR-MCNC: NEGATIVE — SIGNIFICANT CHANGE UP
PROTHROM AB SERPL-ACNC: 17 SEC — HIGH (ref 10.6–13.6)
RBC # BLD: 3.49 M/UL — LOW (ref 3.8–5.2)
RBC # FLD: 15.2 % — HIGH (ref 10.3–14.5)
SODIUM SERPL-SCNC: 140 MMOL/L — SIGNIFICANT CHANGE UP (ref 135–145)
SP GR SPEC: 1.01 — SIGNIFICANT CHANGE UP (ref 1.01–1.02)
UROBILINOGEN FLD QL: NEGATIVE — SIGNIFICANT CHANGE UP
WBC # BLD: 8.73 K/UL — SIGNIFICANT CHANGE UP (ref 3.8–10.5)
WBC # FLD AUTO: 8.73 K/UL — SIGNIFICANT CHANGE UP (ref 3.8–10.5)

## 2020-11-29 PROCEDURE — 87086 URINE CULTURE/COLONY COUNT: CPT

## 2020-11-29 PROCEDURE — 93005 ELECTROCARDIOGRAM TRACING: CPT

## 2020-11-29 PROCEDURE — 99284 EMERGENCY DEPT VISIT MOD MDM: CPT | Mod: 25

## 2020-11-29 PROCEDURE — 74177 CT ABD & PELVIS W/CONTRAST: CPT

## 2020-11-29 PROCEDURE — 87040 BLOOD CULTURE FOR BACTERIA: CPT

## 2020-11-29 PROCEDURE — 82803 BLOOD GASES ANY COMBINATION: CPT

## 2020-11-29 PROCEDURE — 82947 ASSAY GLUCOSE BLOOD QUANT: CPT

## 2020-11-29 PROCEDURE — 71045 X-RAY EXAM CHEST 1 VIEW: CPT

## 2020-11-29 PROCEDURE — 80053 COMPREHEN METABOLIC PANEL: CPT

## 2020-11-29 PROCEDURE — 71045 X-RAY EXAM CHEST 1 VIEW: CPT | Mod: 26

## 2020-11-29 PROCEDURE — 85018 HEMOGLOBIN: CPT

## 2020-11-29 PROCEDURE — 93010 ELECTROCARDIOGRAM REPORT: CPT

## 2020-11-29 PROCEDURE — 84295 ASSAY OF SERUM SODIUM: CPT

## 2020-11-29 PROCEDURE — 83605 ASSAY OF LACTIC ACID: CPT

## 2020-11-29 PROCEDURE — 82330 ASSAY OF CALCIUM: CPT

## 2020-11-29 PROCEDURE — 85610 PROTHROMBIN TIME: CPT

## 2020-11-29 PROCEDURE — 74177 CT ABD & PELVIS W/CONTRAST: CPT | Mod: 26

## 2020-11-29 PROCEDURE — 85014 HEMATOCRIT: CPT

## 2020-11-29 PROCEDURE — 82435 ASSAY OF BLOOD CHLORIDE: CPT

## 2020-11-29 PROCEDURE — 85730 THROMBOPLASTIN TIME PARTIAL: CPT

## 2020-11-29 PROCEDURE — 81001 URINALYSIS AUTO W/SCOPE: CPT

## 2020-11-29 PROCEDURE — 85025 COMPLETE CBC W/AUTO DIFF WBC: CPT

## 2020-11-29 PROCEDURE — 99285 EMERGENCY DEPT VISIT HI MDM: CPT

## 2020-11-29 PROCEDURE — 84132 ASSAY OF SERUM POTASSIUM: CPT

## 2020-11-29 PROCEDURE — 82962 GLUCOSE BLOOD TEST: CPT

## 2020-11-29 RX ORDER — MOXIFLOXACIN HYDROCHLORIDE TABLETS, 400 MG 400 MG/1
1 TABLET, FILM COATED ORAL
Qty: 14 | Refills: 0
Start: 2020-11-29 | End: 2020-12-05

## 2020-11-29 RX ORDER — SODIUM CHLORIDE 9 MG/ML
1000 INJECTION, SOLUTION INTRAVENOUS
Refills: 0 | Status: DISCONTINUED | OUTPATIENT
Start: 2020-11-29 | End: 2020-12-03

## 2020-11-29 RX ORDER — METRONIDAZOLE 500 MG
1 TABLET ORAL
Qty: 21 | Refills: 0
Start: 2020-11-29 | End: 2020-12-05

## 2020-11-29 RX ADMIN — SODIUM CHLORIDE 70 MILLILITER(S): 9 INJECTION, SOLUTION INTRAVENOUS at 16:05

## 2020-11-29 NOTE — ED PROVIDER NOTE - CARE PROVIDER_API CALL
Clark Alvarado  SURGERY  [address] [place] [address], [address]  [place], [place] [id number]  Phone: [phone]  Fax: [phone]  Follow Up Time: 7-10 Days

## 2020-11-29 NOTE — CONSULT NOTE ADULT - SUBJECTIVE AND OBJECTIVE BOX
COLORECTAL SURGERY CONSULT NOTE  --------------------------------------------------------------------------------------------    HPI:   Patient is a 75F pmhx DM2, HTN, COPD, Aflutter on Eliquis, HF with AICD ([email or web address], interrogated on 2019), and perforated diverticulitis requiring Lupe in 2019 which complicated by enterocutaneous fistula and multiple abscesses requiring IR drainage, is now presenting from home for concern of increased drainage from EC fistula. The patient reports that in the last few days the volume of output from her fistula has increased slightly, and that this morning the output smelled worse than usual. Aside from this, she denies any new symptoms, including fevers, malaise, change in ostomy output, change in PO intake, nausea, emesis. She follows with Dr. Alvarado in her office, and was last seen 20.       PAST MEDICAL & SURGICAL HISTORY:  Refusal of blood transfusions as patient is Jehovah&#x27;s Witness    Patient is Jehovah&#x27;s Witness    Vertigo    Atrial flutter    Diverticulitis    Cardiomyopathy    Kidney stone    Cardiac Pacemaker    HTN - Hypertension    Gout    Diabetes    Congestive Heart Failure    Asthma    Status post left hemicolectomy    S/P cholecystectomy    AICD (Automatic Cardioverter/Defibrillator) Present  inserted in Aug, 2008. Due for battery change in 1 month. ( [employer]) . Inserted by Dr Duffy      FAMILY HISTORY:  Family history of brain tumor    [] Family history not pertinent as reviewed with the patient and family      ALLERGIES: Coreg (Other)  digoxin (Other; Short breath (Mild to Mod))  metoprolol (Other)  penicillins (Hives)  Solu-Medrol (Other (Mild to Mod))      CURRENT MEDICATIONS  MEDICATIONS (STANDING): lactated ringers. 1000 milliLiter(s) IV Continuous <Continuous>    MEDICATIONS (PRN):  --------------------------------------------------------------------------------------------    Vitals:   T(C): 36.9 (20 18:15), Max: 36.9 (20 @ 15:00)  HR: 68 (20 18:15) (68 - 91)  BP: 101/63 (20 @ 18:15) (101/63 - 119/69)  RR: 16 (20 18:15) (16 - 18)  SpO2: 98% (20 18:15) (96% - 99%)  CAPILLARY BLOOD GLUCOSE    POCT Blood Glucose.: 77 mg/dL (2020 18:19)    Height (cm): 157.5 ( 13:30)  Weight (kg): 68 (:30)  BMI (kg/m2): 27.4 (:30)  BSA (m2): 1.69 (:30)      PHYSICAL EXAM:   General: NAD, Lying in bed comfortably  Neuro: A+Ox3  HEENT: NC/AT, EOMI  Neck: Soft, supple  Cardio: RRR, nml S1/S2  Resp: Good effort, CTA b/l  GI/Abd: Soft, NT/ND. Ostomy viable with formed stool in bag. Lower midline fistula, about 1-2cm skin opening, with some purulence staining dressing, foul smelling. No additional purulence expressible.   Vascular: All 4 extremities warm.  Skin: Intact, no breakdown  Lymphatic/Nodes: No palpable lymphadenopathy  Musculoskeletal: All 4 extremities moving spontaneously, no limitations  --------------------------------------------------------------------------------------------    LABS  CBC ( 15:31)                              11.2<L>                         8.73    )----------------(  135<L>     59.0  % Neutrophils, 20.0  % Lymphocytes, ANC: 5.67                                33.7<L>    BMP (11-29 @ 15:31)             140     |  99      |  22    		Ca++ --      Ca 9.2                ---------------------------------( 68<L> 		Mg --                 3.6     |  30      |  0.99  			Ph --        LFTs (11-29 @ 15:31)      TPro 6.7 / Alb 3.9 / TBili 0.7 / DBili -- / AST 20 / ALT 23 / AlkPhos 116    Coags ( 15:31)  aPTT 32.8 / INR 1.44<H> / PT 17.0<H>        VBG (11-29 @ 15:30)     7.40 / 53<H> / 31 / 32<H> / 6.3<H> / 50<L>%     Lactate: 1.4    --------------------------------------------------------------------------------------------    MICROBIOLOGY  Urinalysis ( 15:29):     Color: Light Yellow / Appearance: Slightly Turbid<!> / S.014 / pH: 6.0 / Gluc: Negative / Ketones: Negative / Bili: Negative / Urobili: Negative / Protein :Negative / Nitrites: Negative / Leuk.Est: Negative / RBC: 22<H> / WBC: 3 / Sq Epi:  / Non Sq Epi: 1 / Bacteria Negative         --------------------------------------------------------------------------------------------    IMAGING  < from: CT Abdomen and Pelvis w/ Oral Cont and w/ IV Cont (20 @ 17:11) >  FINDINGS:  LOWER CHEST: Partial atelectasis of the right middle lobe, unchanged when compared to prior.    LIVER: Within normal limits.  BILE DUCTS: Normal caliber.  GALLBLADDER: Cholecystectomy.  SPLEEN: Within normal limits.  PANCREAS: Within normal limits.  ADRENALS: Within normal limits.  KIDNEYS/URETERS: Unchanged nonobstructing left renal calculi, with the largest measuring 7 mm. No hydronephrosis.    BLADDER: Within normal limits.  REPRODUCTIVE ORGANS: Fibroid uterus.    BOWEL: Post surgical changes of prior left hemicolectomy and Delgado's procedure. Colostomy is along the right quadrant. 2 previously described fistulous tracts from the anterior peritoneal wall to the left anterior abdomen are unchanged. There is no extravasation of oral contrast. There is no discrete abscess.  PERITONEUM: No ascites.  VESSELS: Within normal limits.  RETROPERITONEUM/LYMPH NODES: No lymphadenopathy.  ABDOMINAL WALL: Diastases of the abdominal wall musculature with protruding small bowel.  BONES: Degenerative changes.    IMPRESSION:  Unchanged enterocutaneous fistulas tracks which do not contain air or contrast. Unchanged from prior exam. No extravasation of oral contrast noted. No new fistulas.    < end of copied text >

## 2020-11-29 NOTE — ED PROVIDER NOTE - PMH
Asthma    Atrial flutter    Cardiac Pacemaker    Cardiomyopathy    Congestive Heart Failure    Diabetes    Diverticulitis    Gout    HTN - Hypertension    Kidney stone    Patient is Rastafarian    Refusal of blood transfusions as patient is Rastafarian    Vertigo

## 2020-11-29 NOTE — ED PROVIDER NOTE - PROGRESS NOTE DETAILS
Attending MD Crow: patient received in sign out. CT shows persistent EC fistula. No s/s sepsis. Surgical team has seen patient and recommends PO augmentin x 7 days with outpatient surgical follow up with Dr. Alvarado as scheduled in 2 weeks.

## 2020-11-29 NOTE — ED PROVIDER NOTE - PHYSICAL EXAMINATION
PHYSICAL EXAM:  GENERAL: NAD, lying in bed comfortably  HEAD:  Atraumatic, Normocephalic  EYES: EOMI, PERRLA, conjunctiva and sclera clear  ENT: MMM, no erythema/pallor/petechiae;   NECK: Supple, No JVD  LUNG: wheezing at b/l bases, no increased WOB  HEART: RRR, +S1/S2; No m/r/g  ABDOMEN: soft, NT/ND; BS+, abdominal wound erythematous and draining purulent material  EXTREMITIES:  2+ Peripheral Pulses, brisk cap refill. No clubbing, cyanosis, or edema  NERVOUS SYSTEM:  AAOx3, speech clear. No deficits   MSK: FROM all 4 extremities, full and equal strength  SKIN: No rashes or lesions

## 2020-11-29 NOTE — ED PROVIDER NOTE - NSFOLLOWUPINSTRUCTIONS_ED_ALL_ED_FT
You were seen in the ED for increased drainage of your abdominal wound. You were discharged with 1 week of antibiotics (Ciprofloxacin and Flagyl). Please follow up with Dr. Alvarado in 1-2 weeks.

## 2020-11-29 NOTE — ED PROVIDER NOTE - CLINICAL SUMMARY MEDICAL DECISION MAKING FREE TEXT BOX
75F w DM2, HTN, CHF, asthma, Aflutter on Eliquis, HF with AICD (Medtronic, interrogated on 09/02/2019), and perforated diverticulitis requiring Lupe in July 2019 which complicated by enterocutaneous fistula and multiple abscesses requiring IR drainage, p/w increased drainage from abdominal wound site. Surgery consulted 75F w DM2, HTN, CHF, asthma, Aflutter on Eliquis, HF with AICD (Medtronic, interrogated on 09/02/2019), and perforated diverticulitis requiring Lupe in July 2019 which complicated by enterocutaneous fistula and multiple abscesses requiring IR drainage, p/w increased drainage from abdominal wound site. Surgery consulted, recommend d/c with 1-week Cipro/Flagyl and f/u w Dr Alvarado in 1-2 weeks. Christen ARAGON:  purulent drainage from EC fistula - plan for labs, CT A/P to assess for collection/ infection. CT was unchanged. Surgery consulted, recommend d/c with 1-week Cipro/Flagyl and f/u w Dr Alvarado in 1-2 weeks.

## 2020-11-29 NOTE — ED PROVIDER NOTE - PATIENT PORTAL LINK FT
You can access the FollowMyHealth Patient Portal offered by [facility] by registering at the following website: [email or web address]. By joining [facility]’s FollowMyHealth portal, you will also be able to view your health information using other applications (apps) compatible with our system.

## 2020-11-29 NOTE — CONSULT NOTE ADULT - ASSESSMENT
ASSESSMENT: Patient is a 75F pmhx DM2, HTN, COPD, Aflutter on Eliquis, HF with AICD (Medtronic, interrogated on 09/02/2019), and perforated diverticulitis requiring Lupe in July 2019 which complicated by enterocutaneous fistula and multiple abscesses requiring IR drainage, is now presenting from home for concern of increased drainage from EC fistula. Labs unremarkable, CT with no changes from previous.    PLAN:   - No need for hospital admission at this time  - Recommend 1 week course of cipro/flagyl (pt penicillin allergic)  - Please have patient follow with Dr. Alvarado as regularly scheduled    Discussed with Dr. Wayne Andersen, PGY-4  [facility] [phone]

## 2020-11-29 NOTE — ED PROVIDER NOTE - PSH
AICD (Automatic Cardioverter/Defibrillator) Present  inserted in Aug, 2008. Due for battery change in 1 month. ( [employer]) . Inserted by Dr Duffy  S/P cholecystectomy    Status post left hemicolectomy

## 2020-11-29 NOTE — ED ADULT NURSE NOTE - OBJECTIVE STATEMENT
76 y/o F, PMH DM2, HTN, asthma, Aflutter (on Eliquis), HF with AICD, perforated diverticulitis requiring Lupe in July 2019 c/b fistula and multiple abscesses requiring IR drainage, presenting to the ED for increased drainage from nonhealing chronic lower abdominal wound site. Pt reports increased malodorous, purulent drainage for the past 2 weeks, also reports family member noted wound area to be more red and also feels sore on palpation, feels "gnawing." Pt denies headache, dizziness, chest pain, palpitations, cough, SOB, n/v, fevers, chills, weakness at this time.

## 2020-11-29 NOTE — ED PROVIDER NOTE - OBJECTIVE STATEMENT
75F w DM2, HTN, CHF, asthma, Aflutter on Eliquis, HF with AICD (Medtronic, interrogated on 09/02/2019), and perforated diverticulitis requiring Lupe in July 2019 which complicated by enterocutaneous fistula and multiple abscesses requiring IR drainage, p/w increased drainage from abdominal wound site. Pt reports increase in drainage for the past 2 weeks, describes her wound as sore and red. Drainage assoc w/ "gnawing pain." Denies HA/LH, N/V/D, CP, SOB, urinary sx. Reports fever to 99 at home.

## 2020-11-29 NOTE — ED ADULT NURSE REASSESSMENT NOTE - NS ED NURSE REASSESS COMMENT FT1
Pt is asleep in room with eyes closed, arouses easily to voice, resting comfortably in stretcher, awaiting CT results and dispo. Safety maintained.

## 2020-11-29 NOTE — ED PROVIDER NOTE - NS ED ROS FT
CONSTITUTIONAL: No weakness  EYES/ENT: No visual changes;  No vertigo or throat pain   NECK: No pain or stiffness  RESPIRATORY: No cough, wheezing, hemoptysis; No shortness of breath  CARDIOVASCULAR: No chest pain or palpitations  GASTROINTESTINAL: No abdominal or epigastric pain. No nausea, vomiting, or hematemesis; No diarrhea or constipation. No melena or hematochezia.  GENITOURINARY: No dysuria, frequency or hematuria  NEUROLOGICAL: No numbness or weakness  SKIN: red, erythematous, purulent abdominal wound  All other review of systems is negative unless indicated above.

## 2020-11-29 NOTE — ED PROVIDER NOTE - ATTENDING CONTRIBUTION TO CARE
Patient is a 74 yo F with history of DM, HTN, COPD Aflutter on Eliquis, HF with AICD, perforated diverticulitis requiring Lupe in July 2019 which was complicated by enterocutaneous fistula and multiple abscesses requiring IR drainage, now here for concern for increased purulent drainage from fistula. Denies fevers, chills, nausea, vomiting.     Surgeon: Dr. Alavrado    VS noted  Gen. no acute distress, Non toxic   HEENT: EOMI, mmm  Lungs: CTAB/L no C/ W /R   CVS: RRR   Abd; Soft, non-tender, colostomy in place, stool and gas in bag, EC fistula with purulent drainage  Ext: no edema  Skin: no rash  Neuro AAOx3 non focal clear speech  a/p: purulent drainage from EC fistula - plan for labs, CT A/P to assess for collection/ infection, surgery eval.   - Christen ARAGON

## 2020-11-30 LAB
CULTURE RESULTS: SIGNIFICANT CHANGE UP
SPECIMEN SOURCE: SIGNIFICANT CHANGE UP

## 2020-12-04 ENCOUNTER — APPOINTMENT (OUTPATIENT)
Dept: SURGERY | Facility: CLINIC | Age: 75
End: 2020-12-04
Payer: MEDICARE

## 2020-12-04 VITALS
OXYGEN SATURATION: 97 % | HEART RATE: 89 BPM | SYSTOLIC BLOOD PRESSURE: 102 MMHG | RESPIRATION RATE: 17 BRPM | TEMPERATURE: 96.5 F | DIASTOLIC BLOOD PRESSURE: 68 MMHG

## 2020-12-04 PROCEDURE — 99213 OFFICE O/P EST LOW 20 MIN: CPT

## 2020-12-04 PROCEDURE — 99072 ADDL SUPL MATRL&STAF TM PHE: CPT

## 2020-12-04 NOTE — HISTORY OF PRESENT ILLNESS
[de-identified] : Pretty Dolan is a 76 y/o female who underwent ex-lap and Lupe's procedure for perforated diverticulitis in July 2019 with abdomen left open, multiple washouts, then abdominal closure with Vicryl mesh (fascia left open) and skin graft over wound. This was complicated by a rectal stump leak with resultant colocutaneous fistula. \par \par Pretty returns today for follow-up after recent ER visit.  She presented to [facility] ED last weekend for c/o increased purulent, foul-smelling drainage from fistula sites.  CT demonstrated known colocutaneous fistulas without fluid collection or abscess.  She was discharged home on cipro and flagyl.  She reports she has been doing well, with normal appetite and tolerating regular diet.  She denies any fevers, chills, or malaise.  The output from the fistula sites has slowly diminished, however still requiring dressing changes 4 times per day.  The output is also less foul-smelling.  Erythema surrounding the fistula sites, as per the patient's daughter, has significantly improved.\par

## 2020-12-04 NOTE — ASSESSMENT
[FreeTextEntry1] : 74F s/p ex-lap and Lupe's procedure for perforated diverticulitis in July 2019 with abdomen left open, multiple washouts, then abdominal closure with vicryl mesh (fascia left open) and skin graft over wound.  She subsequently developed a rectal stump leak, with persistent low output colocutaneous fistula which is well controlled.  Recent episode of infection with cellulitis has significantly improved on Cipro and Flagyl.\par \par \par \par

## 2020-12-04 NOTE — PROCEDURE NOTE - NSINFORMCONSENT_GEN_A_CORE
Benefits, risks, and possible complications of procedure explained to patient/caregiver who verbalized understanding and gave verbal consent.
Benefits, risks, and possible complications of procedure explained to patient/caregiver who verbalized understanding and gave verbal consent.
yes...

## 2020-12-04 NOTE — PHYSICAL EXAM
[Obese, well nourished, in no acute distress] : obese, well nourished, in no acute distress [Normal] : affect appropriate [de-identified] : normal respirations [de-identified] : Soft, nontender, nondistended.  Lower abdominal fistula sites are clean, with small amount of purulent drainage staining the dressings.  There is very minimal erythema surrounding the sites.  No fluctuance.

## 2020-12-04 NOTE — PLAN
[FreeTextEntry1] : Continue antibiotics as prescribed\par Continue dressing changes every 6 hours or as needed\par Follow-up 2 weeks

## 2020-12-08 ENCOUNTER — APPOINTMENT (OUTPATIENT)
Dept: HEART FAILURE | Facility: CLINIC | Age: 75
End: 2020-12-08
Payer: MEDICARE

## 2020-12-08 VITALS
HEIGHT: 62 IN | OXYGEN SATURATION: 94 % | HEART RATE: 89 BPM | DIASTOLIC BLOOD PRESSURE: 63 MMHG | RESPIRATION RATE: 16 BRPM | TEMPERATURE: 98.3 F | BODY MASS INDEX: 29.26 KG/M2 | WEIGHT: 159 LBS | SYSTOLIC BLOOD PRESSURE: 94 MMHG

## 2020-12-08 DIAGNOSIS — I48.20 CHRONIC ATRIAL FIBRILLATION, UNSP: ICD-10-CM

## 2020-12-08 DIAGNOSIS — Z86.79 PERSONAL HISTORY OF OTHER DISEASES OF THE CIRCULATORY SYSTEM: ICD-10-CM

## 2020-12-08 PROCEDURE — 99072 ADDL SUPL MATRL&STAF TM PHE: CPT

## 2020-12-08 PROCEDURE — 99214 OFFICE O/P EST MOD 30 MIN: CPT

## 2020-12-08 NOTE — PHYSICAL EXAM
[Normal Conjunctiva] : the conjunctiva exhibited no abnormalities [] : no respiratory distress [Respiration, Rhythm And Depth] : normal respiratory rhythm and effort [Auscultation Breath Sounds / Voice Sounds] : lungs were clear to auscultation bilaterally [Heart Rate And Rhythm] : heart rate and rhythm were normal [Heart Sounds] : normal S1 and S2 [Arterial Pulses Normal] : the arterial pulses were normal [Edema] : no peripheral edema present [Bowel Sounds] : normal bowel sounds [Abdomen Soft] : soft [Abnormal Walk] : normal gait [Nail Clubbing] : no clubbing of the fingernails [Cyanosis, Localized] : no localized cyanosis [Skin Color & Pigmentation] : normal skin color and pigmentation [Skin Turgor] : normal skin turgor [No Venous Stasis] : no venous stasis [Oriented To Time, Place, And Person] : oriented to person, place, and time [Impaired Insight] : insight and judgment were intact [General Appearance - Well Developed] : well developed [Well Groomed] : well groomed [General Appearance - Well Nourished] : well nourished [General Appearance - In No Acute Distress] : no acute distress [Abdomen Tenderness] : non-tender [Affect] : the affect was normal [Mood] : the mood was normal [FreeTextEntry1] : warm peripherally

## 2020-12-08 NOTE — ASSESSMENT
[FreeTextEntry1] : Ms. Dolan is a 75 year old woman with heart failure with mildly reduced ejection fraction, possibly related to hypertension. She is ACC/AHA stage C with NYHA class III symptoms. She is s/p CRT-D. She is currently limited by knee pain and at times dyspnea, but is without evidence of volume expansion. Her dyspnea may be explained by her severe asthma. She is not tolerant of beta-blockers. She had massive weight loss in the setting of acute illness following her perforated diverticulum, however since last visit her weight has gradually increased and plateaued over the past few weeks.

## 2020-12-08 NOTE — HISTORY OF PRESENT ILLNESS
[FreeTextEntry1] : Ms Dolan is a 75 year old woman with an extensive medical history notable for asthma which she has had since childhood  and has not been well controlled, diabetes ,diverticulosis and HTN. She received a pacemaker many years ago, which was subsequently upgraded to CRT-D ( Medtronic) device in 2013.   An echocardiogram done May 1, 2016, revealed an EF of 35%, although repeat echocardiogram last year showed only mild decrease in her LV systolic function.  Her other comorbidities include a history of GI bleed and a history of respiratory failure leaded to tracheostomy and PEG in 2016. More recently she had a prolonged hospitalization due to a perforated diverticulum leading to an exploratory laparotomy, partial colectomy, end colostomy, and need for a temporary VAD abdominal wound dressing. Her course has been complicated by infections, but she has gradually improved. \par \par Since she was last seen by Dr. Hobson in September she reports overall doing well. Her weight at home has been stable over the past several weeks ranging 150-154lbs, 153lbs today. Her home BP runs with SBP in the 90s. She was seen in the ER on 11/29 for increased purulent and found smelling drainage from colocutaneous fistula sites. CT demonstrated known fistulas without abscess and she was sent home on cipro and flagyl which she completed on 12/6. She has been following up with her surgeon Dr. Alvarado. Her daughter notes drainage to be improving following abx and erythema around site improved. She feels she has been doing well in regards to her activity tolerance and SOB although she notes dyspnea with doing household chores such as vacuuming, changing the beds or doing dishes. Her walking is limited by arthritic knee pain but she is able to walk short distances. She has a chronic cough since her intubation in the past which is at time productive. She denies SOB at rest, orthopnea, PND, CP, palpitations, lightheadedness, abdominal distention, LE edema, fevers, bloody or watery output from ostomy.

## 2020-12-08 NOTE — DISCUSSION/SUMMARY
[FreeTextEntry1] : 1) Chronic systolic heart failure: \par GDMT: I will continue her current doses of enalapril 10mg BID. Will increase spironolactone to 25mg BID and stop KCl PRN. She will have follow up labs in a week to reassess renal function and potassium. She is not tolerant of beta-blockers. I will repeat a TTE with [facility] as it has been over a year since her last study. \par Diuretics: I will continue current dose of lasix 40mg BID. \par Device: s/p CRT-D. Follow up with EP scheduled 12/16\par 2) Atrial fibrillation: Will continue Eliquis. EP follow up with Dr. Che scheduled for 12/16.\par 3) Follow-up in 1 month with Dr. Marie\par

## 2020-12-16 ENCOUNTER — APPOINTMENT (OUTPATIENT)
Dept: ELECTROPHYSIOLOGY | Facility: CLINIC | Age: 75
End: 2020-12-16
Payer: MEDICARE

## 2020-12-16 VITALS
DIASTOLIC BLOOD PRESSURE: 74 MMHG | HEIGHT: 62 IN | SYSTOLIC BLOOD PRESSURE: 118 MMHG | OXYGEN SATURATION: 97 % | HEART RATE: 93 BPM

## 2020-12-16 PROCEDURE — 99203 OFFICE O/P NEW LOW 30 MIN: CPT | Mod: 25

## 2020-12-16 PROCEDURE — 99072 ADDL SUPL MATRL&STAF TM PHE: CPT

## 2020-12-16 PROCEDURE — 93284 PRGRMG EVAL IMPLANTABLE DFB: CPT

## 2020-12-16 PROCEDURE — 93000 ELECTROCARDIOGRAM COMPLETE: CPT | Mod: 59

## 2020-12-17 ENCOUNTER — APPOINTMENT (OUTPATIENT)
Dept: CV DIAGNOSITCS | Facility: HOSPITAL | Age: 75
End: 2020-12-17

## 2020-12-18 ENCOUNTER — NON-APPOINTMENT (OUTPATIENT)
Age: 75
End: 2020-12-18

## 2020-12-18 ENCOUNTER — APPOINTMENT (OUTPATIENT)
Dept: SURGERY | Facility: CLINIC | Age: 75
End: 2020-12-18
Payer: MEDICARE

## 2020-12-18 VITALS
DIASTOLIC BLOOD PRESSURE: 76 MMHG | OXYGEN SATURATION: 96 % | RESPIRATION RATE: 17 BRPM | HEART RATE: 94 BPM | TEMPERATURE: 97.5 F | SYSTOLIC BLOOD PRESSURE: 115 MMHG

## 2020-12-18 PROCEDURE — 99213 OFFICE O/P EST LOW 20 MIN: CPT

## 2020-12-18 PROCEDURE — 99072 ADDL SUPL MATRL&STAF TM PHE: CPT

## 2020-12-18 NOTE — PHYSICAL EXAM
[de-identified] : Well-appearing, appears stated age, no acute distress, uses wheelchair [de-identified] : Normocephalic, sclerae anicteric, mucous membranes moist [de-identified] : Normal respirations [de-identified] : Soft, nontender, nondistended.  Right upper quadrant colostomy appears normal with stool in bag.  Midline ventral incisional hernia is soft, reducible, with no overlying skin changes.  Single left-of-midline fistula opening with skin granulation and some bloody discharge.  The skin surrounding these areas is healthy and dry.  The right fistula site has closed.

## 2020-12-18 NOTE — HISTORY OF PRESENT ILLNESS
[de-identified] : Pretty Dolan presents today for scheduled follow-up.  She reports diminished output from fistula, however the color has changed and it appears slightly bloody.  She states one of the fistula sites appears to have closed.  She does not report significant abdominal pain or skin irritation.  Her ostomy is functioning normally.  She does not report fevers, chills, or weakness.

## 2020-12-18 NOTE — HISTORY OF PRESENT ILLNESS
[FreeTextEntry1] : Dear Dr. Hobson, \par \par Ms. Dolan was seen in the [facility] today. For our records, please allow me to summarize the history and my findings.\par \par This pleasant 75 year old lady has a cardiovascular history significant for PPM implant then upgrade to BI-V ICD by Dr. Stern and here to re-establish care at [facility] for device follow up. She had a long hospitalization earlier this year for clonic diverticulae and underwent many surgeries and still has a healing colo-cutanous fistula. Her ICD site has been good, no erythema, fever. She felt the device has migrate towards her axilla after loosing a lot of weight. She is on Eliquis for pAF, reports minor bleeding with IV blood draws or in the fistula but nothing major. \par \par Bi-V ICD (MDT) interrogation revealed normally functioning device with Bi-V pacing 96%, few episdoes of NSVT, AF burden <1%, battery another 1.9 years. \par \par Ms. Dolan denies any recent history of chest pain, shortness of breath, palpitations, dizziness, or syncope.

## 2020-12-18 NOTE — PLAN
[FreeTextEntry1] : Silver nitrate was applied to remaining fistula site, with resolution of bloody discharge.\par Continue Aquacel and gauze to fistula site 2-3 times daily\par Follow-up with me in 2 weeks

## 2020-12-18 NOTE — PHYSICAL EXAM
[General Appearance - Well Developed] : well developed [General Appearance - In No Acute Distress] : no acute distress [Heart Rate And Rhythm] : heart rate and rhythm were normal [Heart Sounds] : normal S1 and S2 [Murmurs] : no murmurs present [Edema] : no peripheral edema present [Auscultation Breath Sounds / Voice Sounds] : lungs were clear to auscultation bilaterally [Normal Conjunctiva] : the conjunctiva exhibited no abnormalities [Eyelids - No Xanthelasma] : the eyelids demonstrated no xanthelasmas [Normal Oral Mucosa] : normal oral mucosa [No Oral Pallor] : no oral pallor [No Oral Cyanosis] : no oral cyanosis [Normal Jugular Venous A Waves Present] : normal jugular venous A waves present [Normal Jugular Venous V Waves Present] : normal jugular venous V waves present [No Jugular Venous Isidro A Waves] : no jugular venous isidro A waves [Respiration, Rhythm And Depth] : normal respiratory rhythm and effort [Exaggerated Use Of Accessory Muscles For Inspiration] : no accessory muscle use [Abdomen Soft] : soft [Abdomen Tenderness] : non-tender [Abdomen Mass (___ Cm)] : no abdominal mass palpated [Abnormal Walk] : normal gait [Gait - Sufficient For Exercise Testing] : the gait was sufficient for exercise testing [Nail Clubbing] : no clubbing of the fingernails [Cyanosis, Localized] : no localized cyanosis [Petechial Hemorrhages (___cm)] : no petechial hemorrhages [] : no ischemic changes [FreeTextEntry1] : device site left infraclavicular area well healed, no erythema, discharge or tenderness  [Oriented To Time, Place, And Person] : oriented to person, place, and time [Affect] : the affect was normal [Mood] : the mood was normal [No Anxiety] : not feeling anxious

## 2020-12-18 NOTE — ASSESSMENT
[FreeTextEntry1] : 74F s/p ex-lap and Lupe's procedure for perforated diverticulitis in July 2019 with abdomen left open, multiple washouts, then abdominal closure with vicryl mesh (fascia left open) and skin graft over wound.  She subsequently developed a rectal stump leak, with persistent low output colocutaneous fistula which is well controlled.  No evidence of acute cellulitis or infection.\par \par \par \par

## 2020-12-18 NOTE — DISCUSSION/SUMMARY
[FreeTextEntry1] : In summary, this is a 75 year lady with NICM s/p PPM then upgrade to Bi-V ICD, here to establish care. Her device is functioning normal, Bi-V pacing 96%, battery 1.9 years,  AF burden <1%, few episodes of NSVT. She has no symptoms and seems to be compensated from heart failure perspective. She is on Eliquis with minor bleeding episodes from IV draws ans colocutanous fistula but nothing major or significant, we discussed importance of Eliquis in protecting her from CVA and benefits outweigh risk with minor bleeds, so we will continue it. She will follow up with us in 6 months in the device clinic. \par  \par \par Ms. Dolan appeared to understand the whole discussion and verbalized that all of his questions were answered to his satisfaction.\par  \par \par Thank you for allowing me to be involved in the care of this pleasant man. Please feel free to contact me with any questions.\par \par \par Luis Miguel Che MD\par  of Cardiology\par \par Electrophysiology Section\par [address] [place] [address], [address] [place] [address]\[place] [address]\par Office: [phone]\par Fax: [phone]

## 2020-12-29 ENCOUNTER — NON-APPOINTMENT (OUTPATIENT)
Age: 75
End: 2020-12-29

## 2020-12-29 ENCOUNTER — EMERGENCY (EMERGENCY)
Facility: HOSPITAL | Age: 75
LOS: 1 days | Discharge: HOME | End: 2020-12-29
Attending: EMERGENCY MEDICINE
Payer: MEDICARE

## 2020-12-29 VITALS
DIASTOLIC BLOOD PRESSURE: 62 MMHG | HEART RATE: 89 BPM | SYSTOLIC BLOOD PRESSURE: 98 MMHG | OXYGEN SATURATION: 99 % | RESPIRATION RATE: 16 BRPM

## 2020-12-29 VITALS
TEMPERATURE: 98 F | HEART RATE: 99 BPM | DIASTOLIC BLOOD PRESSURE: 80 MMHG | OXYGEN SATURATION: 96 % | WEIGHT: 149.03 LBS | RESPIRATION RATE: 18 BRPM | SYSTOLIC BLOOD PRESSURE: 118 MMHG | HEIGHT: 62 IN

## 2020-12-29 DIAGNOSIS — Z90.49 ACQUIRED ABSENCE OF OTHER SPECIFIED PARTS OF DIGESTIVE TRACT: Chronic | ICD-10-CM

## 2020-12-29 LAB
ALBUMIN SERPL ELPH-MCNC: 4.2 G/DL — SIGNIFICANT CHANGE UP (ref 3.3–5)
ALP SERPL-CCNC: 121 U/L — HIGH (ref 40–120)
ALT FLD-CCNC: 11 U/L — SIGNIFICANT CHANGE UP (ref 10–45)
ANION GAP SERPL CALC-SCNC: 12 MMOL/L — SIGNIFICANT CHANGE UP (ref 5–17)
APPEARANCE UR: CLEAR — SIGNIFICANT CHANGE UP
AST SERPL-CCNC: 22 U/L — SIGNIFICANT CHANGE UP (ref 10–40)
BACTERIA # UR AUTO: NEGATIVE — SIGNIFICANT CHANGE UP
BASE EXCESS BLDV CALC-SCNC: 3.6 MMOL/L — HIGH (ref -2–2)
BASOPHILS # BLD AUTO: 0.06 K/UL — SIGNIFICANT CHANGE UP (ref 0–0.2)
BASOPHILS NFR BLD AUTO: 0.5 % — SIGNIFICANT CHANGE UP (ref 0–2)
BILIRUB SERPL-MCNC: 0.4 MG/DL — SIGNIFICANT CHANGE UP (ref 0.2–1.2)
BILIRUB UR-MCNC: NEGATIVE — SIGNIFICANT CHANGE UP
BUN SERPL-MCNC: 36 MG/DL — HIGH (ref 7–23)
CA-I SERPL-SCNC: 1.2 MMOL/L — SIGNIFICANT CHANGE UP (ref 1.12–1.3)
CALCIUM SERPL-MCNC: 10.1 MG/DL — SIGNIFICANT CHANGE UP (ref 8.4–10.5)
CHLORIDE BLDV-SCNC: 98 MMOL/L — SIGNIFICANT CHANGE UP (ref 96–108)
CHLORIDE SERPL-SCNC: 95 MMOL/L — LOW (ref 96–108)
CO2 BLDV-SCNC: 31 MMOL/L — HIGH (ref 22–30)
CO2 SERPL-SCNC: 27 MMOL/L — SIGNIFICANT CHANGE UP (ref 22–31)
COLOR SPEC: SIGNIFICANT CHANGE UP
CREAT SERPL-MCNC: 1 MG/DL — SIGNIFICANT CHANGE UP (ref 0.5–1.3)
DIFF PNL FLD: NEGATIVE — SIGNIFICANT CHANGE UP
EOSINOPHIL # BLD AUTO: 0.09 K/UL — SIGNIFICANT CHANGE UP (ref 0–0.5)
EOSINOPHIL NFR BLD AUTO: 0.7 % — SIGNIFICANT CHANGE UP (ref 0–6)
EPI CELLS # UR: 3 /HPF — SIGNIFICANT CHANGE UP
GAS PNL BLDV: 136 MMOL/L — SIGNIFICANT CHANGE UP (ref 135–145)
GAS PNL BLDV: SIGNIFICANT CHANGE UP
GAS PNL BLDV: SIGNIFICANT CHANGE UP
GLUCOSE BLDV-MCNC: 157 MG/DL — HIGH (ref 70–99)
GLUCOSE SERPL-MCNC: 164 MG/DL — HIGH (ref 70–99)
GLUCOSE UR QL: NEGATIVE — SIGNIFICANT CHANGE UP
HCO3 BLDV-SCNC: 30 MMOL/L — HIGH (ref 21–29)
HCT VFR BLD CALC: 43.9 % — SIGNIFICANT CHANGE UP (ref 34.5–45)
HCT VFR BLDA CALC: 46 % — SIGNIFICANT CHANGE UP (ref 39–50)
HGB BLD CALC-MCNC: 15.1 G/DL — SIGNIFICANT CHANGE UP (ref 11.5–15.5)
HGB BLD-MCNC: 14.6 G/DL — SIGNIFICANT CHANGE UP (ref 11.5–15.5)
HYALINE CASTS # UR AUTO: 0 /LPF — SIGNIFICANT CHANGE UP (ref 0–2)
IMM GRANULOCYTES NFR BLD AUTO: 0.3 % — SIGNIFICANT CHANGE UP (ref 0–1.5)
KETONES UR-MCNC: NEGATIVE — SIGNIFICANT CHANGE UP
LACTATE BLDV-MCNC: 2 MMOL/L — SIGNIFICANT CHANGE UP (ref 0.7–2)
LEUKOCYTE ESTERASE UR-ACNC: ABNORMAL
LIDOCAIN IGE QN: 15 U/L — SIGNIFICANT CHANGE UP (ref 7–60)
LYMPHOCYTES # BLD AUTO: 17.4 % — SIGNIFICANT CHANGE UP (ref 13–44)
LYMPHOCYTES # BLD AUTO: 2.24 K/UL — SIGNIFICANT CHANGE UP (ref 1–3.3)
MCHC RBC-ENTMCNC: 31.8 PG — SIGNIFICANT CHANGE UP (ref 27–34)
MCHC RBC-ENTMCNC: 33.3 GM/DL — SIGNIFICANT CHANGE UP (ref 32–36)
MCV RBC AUTO: 95.6 FL — SIGNIFICANT CHANGE UP (ref 80–100)
MONOCYTES # BLD AUTO: 0.8 K/UL — SIGNIFICANT CHANGE UP (ref 0–0.9)
MONOCYTES NFR BLD AUTO: 6.2 % — SIGNIFICANT CHANGE UP (ref 2–14)
NEUTROPHILS # BLD AUTO: 9.62 K/UL — HIGH (ref 1.8–7.4)
NEUTROPHILS NFR BLD AUTO: 74.9 % — SIGNIFICANT CHANGE UP (ref 43–77)
NITRITE UR-MCNC: NEGATIVE — SIGNIFICANT CHANGE UP
NRBC # BLD: 0 /100 WBCS — SIGNIFICANT CHANGE UP (ref 0–0)
PCO2 BLDV: 52 MMHG — HIGH (ref 35–50)
PH BLDV: 7.38 — SIGNIFICANT CHANGE UP (ref 7.35–7.45)
PH UR: 6 — SIGNIFICANT CHANGE UP (ref 5–8)
PLATELET # BLD AUTO: 226 K/UL — SIGNIFICANT CHANGE UP (ref 150–400)
PO2 BLDV: 32 MMHG — SIGNIFICANT CHANGE UP (ref 25–45)
POTASSIUM BLDV-SCNC: 4.6 MMOL/L — SIGNIFICANT CHANGE UP (ref 3.5–5.3)
POTASSIUM SERPL-MCNC: 5.3 MMOL/L — SIGNIFICANT CHANGE UP (ref 3.5–5.3)
POTASSIUM SERPL-SCNC: 5.3 MMOL/L — SIGNIFICANT CHANGE UP (ref 3.5–5.3)
PROT SERPL-MCNC: 7.6 G/DL — SIGNIFICANT CHANGE UP (ref 6–8.3)
PROT UR-MCNC: SIGNIFICANT CHANGE UP
RBC # BLD: 4.59 M/UL — SIGNIFICANT CHANGE UP (ref 3.8–5.2)
RBC # FLD: 15.1 % — HIGH (ref 10.3–14.5)
RBC CASTS # UR COMP ASSIST: 6 /HPF — HIGH (ref 0–4)
SAO2 % BLDV: 53 % — LOW (ref 67–88)
SODIUM SERPL-SCNC: 134 MMOL/L — LOW (ref 135–145)
SP GR SPEC: 1.02 — SIGNIFICANT CHANGE UP (ref 1.01–1.02)
UROBILINOGEN FLD QL: NEGATIVE — SIGNIFICANT CHANGE UP
WBC # BLD: 12.85 K/UL — HIGH (ref 3.8–10.5)
WBC # FLD AUTO: 12.85 K/UL — HIGH (ref 3.8–10.5)
WBC UR QL: 16 /HPF — HIGH (ref 0–5)

## 2020-12-29 PROCEDURE — 83690 ASSAY OF LIPASE: CPT

## 2020-12-29 PROCEDURE — 81001 URINALYSIS AUTO W/SCOPE: CPT

## 2020-12-29 PROCEDURE — 84132 ASSAY OF SERUM POTASSIUM: CPT

## 2020-12-29 PROCEDURE — 82803 BLOOD GASES ANY COMBINATION: CPT

## 2020-12-29 PROCEDURE — 85018 HEMOGLOBIN: CPT

## 2020-12-29 PROCEDURE — 96375 TX/PRO/DX INJ NEW DRUG ADDON: CPT

## 2020-12-29 PROCEDURE — 87040 BLOOD CULTURE FOR BACTERIA: CPT

## 2020-12-29 PROCEDURE — 99284 EMERGENCY DEPT VISIT MOD MDM: CPT | Mod: 25

## 2020-12-29 PROCEDURE — 80053 COMPREHEN METABOLIC PANEL: CPT

## 2020-12-29 PROCEDURE — 82330 ASSAY OF CALCIUM: CPT

## 2020-12-29 PROCEDURE — 83605 ASSAY OF LACTIC ACID: CPT

## 2020-12-29 PROCEDURE — 85014 HEMATOCRIT: CPT

## 2020-12-29 PROCEDURE — 96374 THER/PROPH/DIAG INJ IV PUSH: CPT | Mod: XU

## 2020-12-29 PROCEDURE — 85025 COMPLETE CBC W/AUTO DIFF WBC: CPT

## 2020-12-29 PROCEDURE — 87086 URINE CULTURE/COLONY COUNT: CPT

## 2020-12-29 PROCEDURE — 74177 CT ABD & PELVIS W/CONTRAST: CPT

## 2020-12-29 PROCEDURE — 74177 CT ABD & PELVIS W/CONTRAST: CPT | Mod: 26

## 2020-12-29 PROCEDURE — 99285 EMERGENCY DEPT VISIT HI MDM: CPT | Mod: 25

## 2020-12-29 PROCEDURE — 84295 ASSAY OF SERUM SODIUM: CPT

## 2020-12-29 PROCEDURE — 82435 ASSAY OF BLOOD CHLORIDE: CPT

## 2020-12-29 PROCEDURE — 82947 ASSAY GLUCOSE BLOOD QUANT: CPT

## 2020-12-29 RX ORDER — CEFTRIAXONE 500 MG/1
2000 INJECTION, POWDER, FOR SOLUTION INTRAMUSCULAR; INTRAVENOUS ONCE
Refills: 0 | Status: COMPLETED | OUTPATIENT
Start: 2020-12-29 | End: 2020-12-29

## 2020-12-29 RX ORDER — VANCOMYCIN HCL 1 G
1000 VIAL (EA) INTRAVENOUS ONCE
Refills: 0 | Status: COMPLETED | OUTPATIENT
Start: 2020-12-29 | End: 2020-12-29

## 2020-12-29 RX ORDER — SODIUM CHLORIDE 9 MG/ML
1000 INJECTION INTRAMUSCULAR; INTRAVENOUS; SUBCUTANEOUS ONCE
Refills: 0 | Status: COMPLETED | OUTPATIENT
Start: 2020-12-29 | End: 2020-12-29

## 2020-12-29 RX ADMIN — CEFTRIAXONE 100 MILLIGRAM(S): 500 INJECTION, POWDER, FOR SOLUTION INTRAMUSCULAR; INTRAVENOUS at 15:53

## 2020-12-29 RX ADMIN — SODIUM CHLORIDE 1000 MILLILITER(S): 9 INJECTION INTRAMUSCULAR; INTRAVENOUS; SUBCUTANEOUS at 15:53

## 2020-12-29 RX ADMIN — Medication 250 MILLIGRAM(S): at 16:26

## 2020-12-29 NOTE — ED ADULT NURSE NOTE - OBJECTIVE STATEMENT
75 yr old female with a PMH of colon surgery with colostomy for perforated diverticulitis, COPD, Asthma, HTN coming in with lower abdominal pain and nausea for a few days. Pts abdomen with colostomy on left LQ and abdominal wound from surgery x1 year ago with purulent drainage. Pt states that she saw her colorectal surgeon x2 weeks ago and she was not concerned. Pt states +flatus through ostomy and normal output. Pts abdomen tender to touch. Pts skin is intact and normal for race despite, lower abdominal surgical wound. Pt denies any SOB, fever or chills. PT is A&Ox3. +2 palpable pedal pulses. VSS. Will continue to monitor.

## 2020-12-29 NOTE — ED ADULT NURSE REASSESSMENT NOTE - NS ED NURSE REASSESS COMMENT FT1
Report received from SKIP Chance. Pt remains AAOx4, NAD, resp nonlabored, skin warm/dry, resting comfortably in bed with call bell at bedside. Colostomy bag noted to pt abdomen, draining well, no leaking or puss noted to area. Pt denies headache, dizziness, chest pain, palpitations, SOB, n/v/d, urinary symptoms, fevers, chills at this time. Pt awaiting discharge, MD at bedside informing pt of d/c. Safety maintained.

## 2020-12-29 NOTE — ED ADULT NURSE REASSESSMENT NOTE - NS ED NURSE REASSESS COMMENT FT1
Unable to get good PIV access. MD Peter made aware. NP to place IV via ultrasound. Will administer medication and fluids after access is done.

## 2020-12-29 NOTE — ED PROVIDER NOTE - NS ED ROS FT
General: Denies fevers or chills  Eyes: Denies vision changes  ENMT: Denies trouble swallowing or speaking, or sore throat  CV: Denies chest pain or palpitations  Resp: Denies cough or SOB  GI: +1 episode of n/v, lower abd pain. Denies diarrhea, or constipation   : Denies painful urination, increased urinary frequency, or blood in urine  Skin: Denies new rashes  Neuro: Denies headache, numbness, or weakness  MSK: Denies recent trauma

## 2020-12-29 NOTE — ED PROVIDER NOTE - PATIENT PORTAL LINK FT
You can access the FollowMyHealth Patient Portal offered by [facility] by registering at the following website: [email or web address]. By joining [employer]’s FollowMyHealth portal, you will also be able to view your health information using other applications (apps) compatible with our system.

## 2020-12-29 NOTE — ED PROVIDER NOTE - PHYSICAL EXAMINATION
I have reviewed the triage vital signs.  Const: AAOx3, in NAD  Eyes: no conjunctival injection  HENT: NCAT, Neck supple, oral mucosa moist  CV: RRR, +S1, S2  Resp: CTAB, no respiratory distress  GI: Abdomen soft, generalized abd TTP, particularly suprapubic, no guarding, no CVA tenderness, colostomy with stool   Extremities: No peripheral edema,  2+ radial and DP pulses  Skin: Abdominal wall wounds with purulent drainage, no surrounding erythema/TTP  MSK: No gross deformities appreciated  Neuro: No focal sensory or motor deficits  Psych: Appropriate mood and affect

## 2020-12-29 NOTE — CONSULT NOTE ADULT - SUBJECTIVE AND OBJECTIVE BOX
[facility] General Surgery Consultation     Patient is a 75y old  Female who presents with a chief complaint of     HPI:      PAST MEDICAL & SURGICAL HISTORY:  Refusal of blood transfusions as patient is [employer]&#x27;s Witness  Patient is [employer]&#x27;s Witness  Vertigo  Atrial flutter  Diverticulitis  Cardiomyopathy  Kidney stone  Cardiac Pacemaker  HTN - Hypertension  Gout  Diabetes  Congestive Heart Failure  Asthma  Status post left hemicolectomy  S/P cholecystectomy  AICD (Automatic Cardioverter/Defibrillator) Present  inserted in Aug, 2008. Due for battery change in 1 month. ( [employer]) . Inserted by Dr Duffy    FAMILY HISTORY:  Family history of brain tumor    Allergies  Coreg (Other)  digoxin (Other; Short breath (Mild to Mod))  penicillins (Hives)    Intolerances  metoprolol (Other)  Solu-Medrol (Other (Mild to Mod))      Vital Signs Last 24 Hrs  T(C): 36.8 (29 Dec 2020 17:47), Max: 36.9 (29 Dec 2020 13:40)  T(F): 98.3 (29 Dec 2020 17:47), Max: 98.4 (29 Dec 2020 13:40)  HR: 83 (29 Dec 2020 17:47) (83 - 99)  BP: 97/77 (29 Dec 2020 17:47) (97/77 - 118/80)  BP(mean): --  RR: 20 (29 Dec 2020 17:47) (18 - 20)  SpO2: 98% (29 Dec 2020 17:47) (96% - 98%)  Daily Height in cm: 157.48 (29 Dec 2020 12:42)    Daily     Exmaination  General: NAD, well-nourished  HEENT: Atraumatic, EOMI  Resp: Breathing comfortably on RA  CV: Normal sinus rhythm  Abd: soft, nontedner except aroudn chronically draining lower midline wound  Ext: ROMIx4, motor strength intact x 4                          14.6   12.85 )-----------( 226      ( 29 Dec 2020 13:50 )             43.9     12    134<L>  |  95<L>  |  36<H>  ----------------------------<  164<H>  5.3   |  27  |  1.00    Ca    10.1      29 Dec 2020 13:50    TPro  7.6  /  Alb  4.2  /  TBili  0.4  /  DBili  x   /  AST  22  /  ALT  11  /  AlkPhos  121<H>  12-29      Urinalysis Basic - ( 29 Dec 2020 15:47 )    Color: Light Yellow / Appearance: Clear / S.025 / pH: x  Gluc: x / Ketone: Negative  / Bili: Negative / Urobili: Negative   Blood: x / Protein: Trace / Nitrite: Negative   Leuk Esterase: Moderate / RBC: 6 /hpf / WBC 16 /HPF   Sq Epi: x / Non Sq Epi: 3 /hpf / Bacteria: Negative        Radiographic Findings:       Assessment:                  [facility] General Surgery Consultation     Patient is a 75y old  Female who presents with a chief complaint of lower abdominal pain    HPI:  75 woman with pmhx of DM2, HTN, COPD, Aflutter on Eliquis, HF with AICD ([employer], interrogated on 2019), and perforated diverticulitis requiring Lupe in 2019 which complicated by enterocutaneous fistula and multiple abscesses requiring IR drainage, is now presenting with one day of lower abdominal pain. She reports bilateral suprapubic pain that feels like a bloated discomfort that has been off and on. It rates 7/10 and is not associated with fever or chills. She did have an episode of emesis only after she took Miralax after the doctors office suggested it. She has continued to pass gas and have ostomy function in the bag. She has not noticed any blood or change in colour/quality/amount of the ostomy output recently. She has a chronic midline draining fistula that has also not changed in amount or quality.     In theED the patient was afebrile, normotensive, and normocardic. Labs show leukocytosis to 12.8, otherwise normal.     PAST MEDICAL & SURGICAL HISTORY:  Refusal of blood transfusions as patient is Jehovah&#x27;s Witness  Patient is Jehovah&#x27;s Witness  Vertigo  Atrial flutter  Diverticulitis  Cardiomyopathy  Kidney stone  Cardiac Pacemaker  HTN - Hypertension  Gout  Diabetes  Congestive Heart Failure  Asthma  Status post left hemicolectomy  S/P cholecystectomy  AICD (Automatic Cardioverter/Defibrillator) Present  inserted in Aug, 2008. Due for battery change in 1 month. ( [employer]) . Inserted by Dr Duffy    FAMILY HISTORY:  Family history of brain tumor    Allergies  Coreg (Other)  digoxin (Other; Short breath (Mild to Mod))  penicillins (Hives)    Intolerances  metoprolol (Other)  Solu-Medrol (Other (Mild to Mod))      Vital Signs Last 24 Hrs  T(C): 36.8 (29 Dec 2020 17:47), Max: 36.9 (29 Dec 2020 13:40)  T(F): 98.3 (29 Dec 2020 17:47), Max: 98.4 (29 Dec 2020 13:40)  HR: 83 (29 Dec 2020 17:47) (83 - 99)  BP: 97/77 (29 Dec 2020 17:47) (97/77 - 118/80)  BP(mean): --  RR: 20 (29 Dec 2020 17:47) (18 - 20)  SpO2: 98% (29 Dec 2020 17:47) (96% - 98%)  Daily Height in cm: 157.48 (29 Dec 2020 12:42)    Daily     Exmaination  General: NAD, well-nourished  HEENT: Atraumatic, EOMI  Resp: Breathing comfortably on RA  CV: Normal sinus rhythm  Abd: soft, nontedner except aroudn chronically draining lower midline wound  Ext: ROMIx4, motor strength intact x 4                          14.6   12.85 )-----------( 226      ( 29 Dec 2020 13:50 )             43.9     1229    134<L>  |  95<L>  |  36<H>  ----------------------------<  164<H>  5.3   |  27  |  1.00    Ca    10.1      29 Dec 2020 13:50    TPro  7.6  /  Alb  4.2  /  TBili  0.4  /  DBili  x   /  AST  22  /  ALT  11  /  AlkPhos  121<H>  12-29      Urinalysis Basic - ( 29 Dec 2020 15:47 )    Color: Light Yellow / Appearance: Clear / S.025 / pH: x  Gluc: x / Ketone: Negative  / Bili: Negative / Urobili: Negative   Blood: x / Protein: Trace / Nitrite: Negative   Leuk Esterase: Moderate / RBC: 6 /hpf / WBC 16 /HPF   Sq Epi: x / Non Sq Epi: 3 /hpf / Bacteria: Negative        Radiographic Findings:       Assessment:                  [facility] General Surgery Consultation     Patient is a 75y old  Female who presents with a chief complaint of lower abdominal pain    HPI:  75 woman with pmhx of DM2, HTN, COPD, Aflutter on Eliquis, HF with AICD ([employer], interrogated on 2019), and perforated diverticulitis requiring Lupe in 2019 which complicated by enterocutaneous fistula and multiple abscesses requiring IR drainage, is now presenting with one day of lower abdominal pain. She reports bilateral suprapubic pain that feels like a bloated discomfort that has been off and on. It rates 7/10 and is not associated with fever or chills. She did have an episode of emesis only after she took Miralax after the doctors office suggested it. She had been having decreased output lately. She has continued to pass gas and have ostomy function in the bag. She has not noticed any blood or change in colour/quality/amount of the ostomy output recently. She has a chronic midline draining fistula that has also not changed in amount or quality.     In theED the patient was afebrile, normotensive, and normocardic. Labs show leukocytosis to 12.8, otherwise normal. CT scan showed no abnormalities.    PAST MEDICAL & SURGICAL HISTORY:  Refusal of blood transfusions as patient is Jehovah&#x27;s Witness  Patient is Jehovah&#x27;s Witness  Vertigo  Atrial flutter  Diverticulitis  Cardiomyopathy  Kidney stone  Cardiac Pacemaker  HTN - Hypertension  Gout  Diabetes  Congestive Heart Failure  Asthma  Status post left hemicolectomy  S/P cholecystectomy  AICD (Automatic Cardioverter/Defibrillator) Present  inserted in Aug, 2008. Due for battery change in 1 month. ( [employer]) . Inserted by Dr Duffy    FAMILY HISTORY:  Family history of brain tumor    Allergies  Coreg (Other)  digoxin (Other; Short breath (Mild to Mod))  penicillins (Hives)    Intolerances  metoprolol (Other)  Solu-Medrol (Other (Mild to Mod))      Vital Signs Last 24 Hrs  T(C): 36.8 (29 Dec 2020 17:47), Max: 36.9 (29 Dec 2020 13:40)  T(F): 98.3 (29 Dec 2020 17:47), Max: 98.4 (29 Dec 2020 13:40)  HR: 83 (29 Dec 2020 17:47) (83 - 99)  BP: 97/77 (29 Dec 2020 17:47) (97/77 - 118/80)  BP(mean): --  RR: 20 (29 Dec 2020 17:47) (18 - 20)  SpO2: 98% (29 Dec 2020 17:47) (96% - 98%)  Daily Height in cm: 157.48 (29 Dec 2020 12:42)    Daily     Exmaination  General: NAD, well-nourished  HEENT: Atraumatic, EOMI  Resp: Breathing comfortably on RA  CV: Normal sinus rhythm  Abd: soft, nontender except around chronically draining lower midline wound with baselline purulent drianage, skin graft site healing well without cellulitis, ostomy funcitonal and viable, dark green formed stool  Ext: ROMIx4, motor strength intact x 4                          14.6   12.85 )-----------( 226      ( 29 Dec 2020 13:50 )             43.9     12    134<L>  |  95<L>  |  36<H>  ----------------------------<  164<H>  5.3   |  27  |  1.00    Ca    10.1      29 Dec 2020 13:50    TPro  7.6  /  Alb  4.2  /  TBili  0.4  /  DBili  x   /  AST  22  /  ALT  11  /  AlkPhos  121<H>  12-29      Urinalysis Basic - ( 29 Dec 2020 15:47 )    Color: Light Yellow / Appearance: Clear / S.025 / pH: x  Gluc: x / Ketone: Negative  / Bili: Negative / Urobili: Negative   Blood: x / Protein: Trace / Nitrite: Negative   Leuk Esterase: Moderate / RBC: 6 /hpf / WBC 16 /HPF   Sq Epi: x / Non Sq Epi: 3 /hpf / Bacteria: Negative        Radiographic Findings:   < from: CT Abdomen and Pelvis w/ IV Cont (20 @ 16:49) >  FINDINGS:  LOWER CHEST: Right middle lobe atelectasis, unchanged. Partially visualized cardiac pacemaker and AICD leads.    LIVER: Within normal limits.  BILE DUCTS: Normal caliber.  GALLBLADDER: Cholecystectomy.  SPLEEN: Within normal limits.  PANCREAS: Within normal limits.  ADRENALS: Within normal limits.  KIDNEYS/URETERS: No hydronephrosis. Nonobstructing left renal calculi measuring up to 7 mm, unchanged. Subcentimeter right renal hypodensity is too small to characterize.    BLADDER: Within normal limits.  REPRODUCTIVE ORGANS: Fibroid uterus. No solid adnexal masses.    BOWEL: Status post left hemicolectomy and Lupe procedure. Right upper quadrant colostomy. No bowel obstruction. Colonic diverticulosis without evidence of acute diverticulitis. Appendix is normal.  PERITONEUM: No ascites. No pneumoperitoneum. No fluid collection.  VESSELS: Within normal limits.  RETROPERITONEUM/LYMPH NODES: No lymphadenopathy.  ABDOMINAL WALL: Large rectus abdominis diastases and ventral hernia containing nonobstructed stomach and bowel. Right upper quadrant colostomy. Evidence of 2 prior fistulous tracts in the left anterior abdominal wall.  BONES: Grade 1 anterolisthesis of L4 on L5, unchanged. Degenerative changes.    IMPRESSION:  No acute intra-abdominal pathology.    No significant change when compared with prior CT abdomen pelvis 2020.      < end of copied text >      Assessment:   75 woman with pmhx of DM2, HTN, COPD, Aflutter on Eliquis, HF with AICD ([employer], interrogated on 2019), and perforated diverticulitis requiring Lupe in 2019 which complicated by enterocutaneous fistula and multiple abscesses requiring IR drainage, is now presenting with one day of lower abdominal pain. Patient feels much better now, pain has completely resolved after miralax and ostomy is functioning well. At this time both Dr. Alvarado and the patient agree it is appropriate to discharge the patient home with follow up next week in the office.     Discussed with Dr. Alvarado  [facility]  [phone]

## 2020-12-29 NOTE — ED PROVIDER NOTE - NSFOLLOWUPINSTRUCTIONS_ED_ALL_ED_FT
you were seen in the emergency department today for abdominal pain.   you had lab work and imaging performed that didn't show any emergent findings.   you were seen and evaluted by you were seen in the emergency department today for abdominal pain.   you had lab work and imaging performed that didn't show any emergent findings.   you were seen and evaluated by surgery, no antibiotics are needed at this time.   please follow up with your surgeon, Dr. Alvarado, within one week.   you can take tylenol at home for pain as needed.   return to the emergency department for any new or worsening symptoms.     You were seen today in the emergency room for abdominal pain. Although the testing done today indicates that your pain is not from an acute emergency, your pain could still represent a more serious problem. Most commonly, the pain you are having is likely not something serious and will resolve in a few days, however testing was done today based on the symptoms that you currently have; so if you develop new or worsening symptoms this could be a sign of a problem that was not tested for and means you should come back to the emergency room or see your doctor urgently. You need to follow up with your doctor in the next 48-72 hours. If you develop any new or worsening symptoms you need to return immediately to the emergency department. If you experience any of the following please come right back to the emergency room: severe nausea and vomiting with inability to tolerate eating, severe worsening of your pain, a new fever, new bleeding in stool or vomit, confusion, new numbness or weakness, passing out.

## 2020-12-29 NOTE — ED ADULT NURSE NOTE - NSIMPLEMENTINTERV_GEN_ALL_ED
None Implemented All Universal Safety Interventions:  [place] to call system. Call bell, personal items and telephone within reach. Instruct patient to call for assistance. Room bathroom lighting operational. Non-slip footwear when patient is off stretcher. Physically safe environment: no spills, clutter or unnecessary equipment. Stretcher in lowest position, wheels locked, appropriate side rails in place.

## 2020-12-29 NOTE — ED PROVIDER NOTE - PROGRESS NOTE DETAILS
[place], PGY2 - Pt tolerating PO. Pt evaluated by surgery, resident anticipates DC home, but needs to d/w attending

## 2020-12-29 NOTE — ED PROVIDER NOTE - OBJECTIVE STATEMENT
75F PMH DM2, HTN, CHF, asthma, Aflutter on Eliquis, HF with AICD (Medtronic, interrogated on 09/02/2019), and perforated diverticulitis requiring Lupe in July 2019 which complicated by enterocutaneous fistula and multiple abscesses requiring IR drainage p/w lower abd pain x 1 day. Pain described as intermittent, nonradiating. No known provoking/alleviating factors. Pt called her surgeon's office Dr. Clark Alvarado (did not speak to her surgeon), and someone had recommended Miralax. Pt took Miralax and had 1 episode of n/v. Otherwise pt denies f/c, CP, SOB, urinary sx. States colostomy output has been normal. Of note, pt has open abdominal wound site for over 1 yr since procedure with drainage. States the drainage has been improved. Site was last evaluated by her surgeon 2 weeks ago, who stated it looked improved. 75F PMH DM2, HTN, CHF, asthma, Aflutter on Eliquis, HF with AICD (Medtronic, interrogated on 09/02/2019), and perforated diverticulitis requiring Lupe in July 2019 which complicated by enterocutaneous fistula and multiple abscesses requiring IR drainage p/w lower abd pain x 1 day. Pain described as intermittent, nonradiating. No known provoking/alleviating factors. Pt called her surgeon's office Dr. Clark Alvarado (did not speak to her surgeon), and someone had recommended Miralax. Pt took Miralax and had 1 episode of n/v. Otherwise pt denies f/c, CP, SOB, urinary sx. States colostomy output has been normal. Of note, pt has lower abdominal draining fistula for over 1 yr since procedure. States the drainage has been improved. Site was last evaluated by her surgeon 2 weeks ago, who stated it looked improved.

## 2020-12-29 NOTE — ED PROVIDER NOTE - PMH
Asthma    Atrial flutter    Cardiac Pacemaker    Cardiomyopathy    Congestive Heart Failure    Diabetes    Diverticulitis    Gout    HTN - Hypertension    Kidney stone    Patient is Mu-ism    Refusal of blood transfusions as patient is Mu-ism    Vertigo

## 2020-12-29 NOTE — ED PROVIDER NOTE - CLINICAL SUMMARY MEDICAL DECISION MAKING FREE TEXT BOX
allyn - 75 f with remote colon surg with colostomy for perf divertic - with several days lower abd pain n/v normal output from ostomy pt with persistant would to abd wall - endorses improved from baseline however purulent drainage noted no rebound or guarding llq ttp vs suprpubic ttp - ct abd r/o intrabd source 9 divertic vs abscess) vs uti - empiric abx and reeval

## 2020-12-30 ENCOUNTER — NON-APPOINTMENT (OUTPATIENT)
Age: 75
End: 2020-12-30

## 2020-12-30 LAB
CULTURE RESULTS: SIGNIFICANT CHANGE UP
SPECIMEN SOURCE: SIGNIFICANT CHANGE UP

## 2021-01-06 ENCOUNTER — APPOINTMENT (OUTPATIENT)
Dept: HEART FAILURE | Facility: CLINIC | Age: 76
End: 2021-01-06
Payer: MEDICARE

## 2021-01-06 VITALS
SYSTOLIC BLOOD PRESSURE: 99 MMHG | BODY MASS INDEX: 28.16 KG/M2 | HEART RATE: 89 BPM | WEIGHT: 153 LBS | DIASTOLIC BLOOD PRESSURE: 64 MMHG | HEIGHT: 62 IN | OXYGEN SATURATION: 98 % | RESPIRATION RATE: 16 BRPM | TEMPERATURE: 98 F

## 2021-01-06 LAB
ANION GAP SERPL CALC-SCNC: 13 MMOL/L
BUN SERPL-MCNC: 23 MG/DL
CALCIUM SERPL-MCNC: 9.2 MG/DL
CHLORIDE SERPL-SCNC: 97 MMOL/L
CO2 SERPL-SCNC: 29 MMOL/L
CREAT SERPL-MCNC: 1.13 MG/DL
GLUCOSE SERPL-MCNC: 159 MG/DL
MAGNESIUM SERPL-MCNC: 1.8 MG/DL
NT-PROBNP SERPL-MCNC: 76 PG/ML
POTASSIUM SERPL-SCNC: 3.8 MMOL/L
SODIUM SERPL-SCNC: 138 MMOL/L

## 2021-01-06 PROCEDURE — 99072 ADDL SUPL MATRL&STAF TM PHE: CPT

## 2021-01-06 PROCEDURE — 99215 OFFICE O/P EST HI 40 MIN: CPT

## 2021-01-06 NOTE — ASSESSMENT
[FreeTextEntry1] : 74 YO F with a history of ACC/AHA Stage C NICM with improved LVEF (LVEF 35->45%) s/p CRT-D, paroxysmal atrial flutter on eliquis, severe asthma perforated diverticulitis s/p Lupe 7/2019 c/b enterocutaneous fistula and multiple abscesses requiring IR drainage presenting to HF clinic for further management. \par \par Today she appears euvolemic on exam and reports NYHA II-III symptoms which is multifactorial. She appears well compensated from a HF perspective. \par \par

## 2021-01-06 NOTE — PHYSICAL EXAM
[General Appearance - Well Developed] : well developed [Well Groomed] : well groomed [General Appearance - Well Nourished] : well nourished [General Appearance - In No Acute Distress] : no acute distress [Normal Conjunctiva] : the conjunctiva exhibited no abnormalities [] : no respiratory distress [Respiration, Rhythm And Depth] : normal respiratory rhythm and effort [Auscultation Breath Sounds / Voice Sounds] : lungs were clear to auscultation bilaterally [Heart Rate And Rhythm] : heart rate and rhythm were normal [Heart Sounds] : normal S1 and S2 [Arterial Pulses Normal] : the arterial pulses were normal [Edema] : no peripheral edema present [Bowel Sounds] : normal bowel sounds [Abdomen Soft] : soft [Abdomen Tenderness] : non-tender [Abnormal Walk] : normal gait [Nail Clubbing] : no clubbing of the fingernails [Cyanosis, Localized] : no localized cyanosis [Skin Color & Pigmentation] : normal skin color and pigmentation [Skin Turgor] : normal skin turgor [No Venous Stasis] : no venous stasis [Oriented To Time, Place, And Person] : oriented to person, place, and time [Impaired Insight] : insight and judgment were intact [Affect] : the affect was normal [Mood] : the mood was normal [FreeTextEntry1] : warm peripherally

## 2021-01-06 NOTE — DISCUSSION/SUMMARY
[FreeTextEntry1] : 1) Chronic systolic heart failure: \par - Repeat TTE pending 1/22 \par - GDMT: current regimen is enalapril 10 BID and spironolactone 25 TID. She is intolerant of BB due to severe bronchospastic disease. Will repeat TTE with definity and consider transition of ACEi to ARNI if LVEF < 40%\par - Diuretics: current regimen is lasix 40 mg BID. will continue \par - Device: s/p CRT-D, follows with Dr. Newton\par - Labs: obtain today to monitor potassium \par \par 2) Atrial fibrillation: \par - Will continue Eliquis\par \par RTC in 4 months \par

## 2021-01-06 NOTE — HISTORY OF PRESENT ILLNESS
[FreeTextEntry1] : Ms Dolan is a 76 YO F with a history of ACC/AHA Stage C NICM with improved LVEF (LVEF 35->45%) s/p CRT-D, paroxysmal atrial flutter on eliquis, severe asthma perforated diverticulitis s/p Lupe 7/2019 c/b enterocutaneous fistula and multiple abscesses requiring IR drainage presenting to HF clinic for further management. \par \par She received a pacemaker many years ago, which was subsequently upgraded to CRT-D ( Medtronic) device in 2013.   An echocardiogram done in 2016, revealed an EF of 35%, although repeat echocardiogram last year showed only mild decrease in her LV systolic function.  Her other comorbidities include a history of GI bleed and a history of respiratory failure leaded to tracheostomy and PEG in 2016 which have since been removed. More recently she had a prolonged hospitalization due to a perforated diverticulum leading to an exploratory laparotomy, partial colectomy, and end colostomy. Her course has been complicated by infections, but she has gradually improved. \par \par She reports today for HF followup. She feels overall and feels limited primarily by weakness. Denies any orthopnea or lower extremity edema. She notes dyspnea when bathing herself in the morning. Denies dizziness or lighteadedness. She does note some left sided abdominal pain which is mild and has been going on for some time. Denies fevers or chills.

## 2021-01-08 ENCOUNTER — APPOINTMENT (OUTPATIENT)
Dept: SURGERY | Facility: CLINIC | Age: 76
End: 2021-01-08
Payer: MEDICARE

## 2021-01-08 VITALS
SYSTOLIC BLOOD PRESSURE: 104 MMHG | OXYGEN SATURATION: 95 % | RESPIRATION RATE: 17 BRPM | TEMPERATURE: 97 F | DIASTOLIC BLOOD PRESSURE: 64 MMHG | HEART RATE: 97 BPM

## 2021-01-08 LAB
ANION GAP SERPL CALC-SCNC: 15 MMOL/L
BUN SERPL-MCNC: 31 MG/DL
CALCIUM SERPL-MCNC: 9.6 MG/DL
CHLORIDE SERPL-SCNC: 100 MMOL/L
CO2 SERPL-SCNC: 25 MMOL/L
CREAT SERPL-MCNC: 1.33 MG/DL
GLUCOSE SERPL-MCNC: 126 MG/DL
POTASSIUM SERPL-SCNC: 4.1 MMOL/L
SODIUM SERPL-SCNC: 140 MMOL/L

## 2021-01-08 PROCEDURE — 99072 ADDL SUPL MATRL&STAF TM PHE: CPT

## 2021-01-08 PROCEDURE — 99213 OFFICE O/P EST LOW 20 MIN: CPT

## 2021-01-08 NOTE — HISTORY OF PRESENT ILLNESS
[de-identified] : Pretty Dolan is a 74 y/o female who underwent ex-lap and Lupe's procedure for perforated diverticulitis in July 2019 with abdomen left open, multiple washouts, then abdominal closure with Vicryl mesh (fascia left open) and skin graft over wound. This was complicated by a rectal stump leak with resultant colocutaneous fistula. She is here for a follow up visit. Last seen on 12/18/20. \par In the interim, on 12/29/2020, she presented to the [place] emergency department for complaints of abdominal pain and vomiting.  Her symptoms resolved before she left.  CT scan performed at the time demonstrated no acute intra-abdominal pathology.\par \par The patient has been doing well since then.  She has not had any further abdominal pain.  She is tolerating a regular diet without any nausea.  Her fistula sites continue to drain a small amount of purulent fluid.  These dressings are changed twice a day, which has not changed in the last several months.\par

## 2021-01-08 NOTE — ASSESSMENT
[FreeTextEntry1] : 74F s/p ex-lap and Lupe's procedure for perforated diverticulitis in July 2019 with abdomen left open, multiple washouts, then abdominal closure with vicryl mesh (fascia left open) and skin graft over wound.  She subsequently developed a rectal stump leak, with persistent low output colocutaneous fistula x2 which are well controlled.  No evidence of acute cellulitis or infection.\par \par \par \par

## 2021-01-08 NOTE — PHYSICAL EXAM
[de-identified] : Well-appearing, appears stated age, no acute distress, uses wheelchair [de-identified] : Normocephalic, sclerae anicteric, mucous membranes moist [de-identified] : Normal respirations [de-identified] : Soft, nontender, nondistended.  Right upper quadrant colostomy appears normal with stool in bag.  Midline ventral incisional hernia is soft, reducible, with no overlying skin changes.  2 small fistula sites in lower abdomen with granulation tissue at their opening sites.  Skin around them is clean and dry and non-erythematous.

## 2021-01-08 NOTE — PLAN
[FreeTextEntry1] : Continue dressing changes as needed.\par We discussed previously that surgical intervention would be risky and likely result in a prolonged recuperation.  The patient does not want to undergo surgery.  With low output from the fistula sites and no evidence of active infection, local wound care is appropriate.\par Follow-up with me 1 month

## 2021-01-13 NOTE — ED ADULT NURSE NOTE - NSFALLRSKASSISTTYPE_ED_ALL_ED
Standing Consent (Marginal Mandibular)/Introductory Paragraph: The rationale for Mohs was explained to the patient and consent was obtained. The risks, benefits and alternatives to therapy were discussed in detail. Specifically, the risks of damage to the marginal mandibular branch of the facial nerve, infection, scarring, bleeding, prolonged wound healing, incomplete removal, allergy to anesthesia, and recurrence were addressed. Prior to the procedure, the treatment site was clearly identified and confirmed by the patient. All components of Universal Protocol/PAUSE Rule completed.

## 2021-01-19 NOTE — ED PROVIDER NOTE - NEUROLOGICAL, MLM
Alert and oriented, no focal deficits, no motor or sensory deficits. Wartpeel Pregnancy And Lactation Text: This medication is Pregnancy Category X and contraindicated in pregnancy and in women who may become pregnant. It is unknown if this medication is excreted in breast milk.

## 2021-01-22 ENCOUNTER — APPOINTMENT (OUTPATIENT)
Dept: CV DIAGNOSITCS | Facility: HOSPITAL | Age: 76
End: 2021-01-22

## 2021-01-22 ENCOUNTER — OUTPATIENT (OUTPATIENT)
Dept: OUTPATIENT SERVICES | Facility: HOSPITAL | Age: 76
LOS: 1 days | End: 2021-01-22
Payer: MEDICARE

## 2021-01-22 DIAGNOSIS — Z90.49 ACQUIRED ABSENCE OF OTHER SPECIFIED PARTS OF DIGESTIVE TRACT: Chronic | ICD-10-CM

## 2021-01-22 DIAGNOSIS — I50.42 CHRONIC COMBINED SYSTOLIC (CONGESTIVE) AND DIASTOLIC (CONGESTIVE) HEART FAILURE: ICD-10-CM

## 2021-01-22 PROCEDURE — C8929: CPT

## 2021-01-22 PROCEDURE — 93306 TTE W/DOPPLER COMPLETE: CPT | Mod: 26

## 2021-01-28 ENCOUNTER — NON-APPOINTMENT (OUTPATIENT)
Age: 76
End: 2021-01-28

## 2021-02-02 DIAGNOSIS — I50.42 CHRONIC COMBINED SYSTOLIC (CONGESTIVE) AND DIASTOLIC (CONGESTIVE) HEART FAILURE: ICD-10-CM

## 2021-02-12 ENCOUNTER — APPOINTMENT (OUTPATIENT)
Dept: SURGERY | Facility: CLINIC | Age: 76
End: 2021-02-12
Payer: MEDICARE

## 2021-02-12 DIAGNOSIS — Z09 ENCOUNTER FOR FOLLOW-UP EXAMINATION AFTER COMPLETED TREATMENT FOR CONDITIONS OTHER THAN MALIGNANT NEOPLASM: ICD-10-CM

## 2021-02-12 PROCEDURE — 99212 OFFICE O/P EST SF 10 MIN: CPT

## 2021-02-12 PROCEDURE — 99072 ADDL SUPL MATRL&STAF TM PHE: CPT

## 2021-02-12 NOTE — PHYSICAL EXAM
[de-identified] : Normocephalic, sclerae anicteric, mucous membranes moist [de-identified] : Well-appearing, appears stated age, no acute distress, uses wheelchair [de-identified] : Normal respirations [de-identified] : Soft, nontender, nondistended.  Right upper quadrant colostomy appears normal with stool in bag.  Midline ventral incisional hernia is soft, reducible, with no overlying skin changes.  2 small fistula sites in lower abdomen with granulation tissue at their opening sites.  Skin around them is clean and dry and non-erythematous.

## 2021-02-12 NOTE — HISTORY OF PRESENT ILLNESS
[de-identified] : Pretty Dolan is a 74 y/o female S/P ex-lap and Lupe's procedure for perforated diverticulitis in July 2019 with abdomen left open, multiple washouts, then abdominal closure with Vicryl mesh (fascia left open) and skin graft over wound. This was complicated by a rectal stump leak with resultant colocutaneous fistula. She is here for a follow up visit.\par \par The patient has been doing well over the last 1 month.  She has occasionally noted reduced output from the fistulas.  She still requiring 2 dressing changes per day.  She has occasional brief periods of lower abdominal pain, however these usually self resolve.  She is currently very comfortable and reports no pain.  She is tolerating a regular diet.  Her ostomy is functioning normally.\par \par

## 2021-02-26 ENCOUNTER — APPOINTMENT (OUTPATIENT)
Dept: ELECTROPHYSIOLOGY | Facility: CLINIC | Age: 76
End: 2021-02-26
Payer: MEDICARE

## 2021-02-26 PROCEDURE — 93295 DEV INTERROG REMOTE 1/2/MLT: CPT

## 2021-02-26 PROCEDURE — 93296 REM INTERROG EVL PM/IDS: CPT

## 2021-03-04 ENCOUNTER — NON-APPOINTMENT (OUTPATIENT)
Age: 76
End: 2021-03-04

## 2021-03-08 ENCOUNTER — EMERGENCY (EMERGENCY)
Facility: HOSPITAL | Age: 76
LOS: 1 days | Discharge: HOME | End: 2021-03-08
Attending: EMERGENCY MEDICINE
Payer: MEDICARE

## 2021-03-08 VITALS
RESPIRATION RATE: 18 BRPM | HEIGHT: 62 IN | HEART RATE: 99 BPM | DIASTOLIC BLOOD PRESSURE: 78 MMHG | SYSTOLIC BLOOD PRESSURE: 128 MMHG | OXYGEN SATURATION: 99 % | WEIGHT: 160.06 LBS | TEMPERATURE: 98 F

## 2021-03-08 DIAGNOSIS — Z90.49 ACQUIRED ABSENCE OF OTHER SPECIFIED PARTS OF DIGESTIVE TRACT: Chronic | ICD-10-CM

## 2021-03-08 LAB
ALBUMIN SERPL ELPH-MCNC: 3.9 G/DL — SIGNIFICANT CHANGE UP (ref 3.3–5)
ALP SERPL-CCNC: 111 U/L — SIGNIFICANT CHANGE UP (ref 40–120)
ALT FLD-CCNC: 14 U/L — SIGNIFICANT CHANGE UP (ref 10–45)
ANION GAP SERPL CALC-SCNC: 13 MMOL/L — SIGNIFICANT CHANGE UP (ref 5–17)
APPEARANCE UR: CLEAR — SIGNIFICANT CHANGE UP
APTT BLD: 30.6 SEC — SIGNIFICANT CHANGE UP (ref 27.5–35.5)
AST SERPL-CCNC: 16 U/L — SIGNIFICANT CHANGE UP (ref 10–40)
BACTERIA # UR AUTO: NEGATIVE — SIGNIFICANT CHANGE UP
BASE EXCESS BLDV CALC-SCNC: -9.4 MMOL/L — LOW (ref -2–2)
BASOPHILS # BLD AUTO: 0.05 K/UL — SIGNIFICANT CHANGE UP (ref 0–0.2)
BASOPHILS NFR BLD AUTO: 0.5 % — SIGNIFICANT CHANGE UP (ref 0–2)
BILIRUB SERPL-MCNC: 0.4 MG/DL — SIGNIFICANT CHANGE UP (ref 0.2–1.2)
BILIRUB UR-MCNC: NEGATIVE — SIGNIFICANT CHANGE UP
BLD GP AB SCN SERPL QL: NEGATIVE — SIGNIFICANT CHANGE UP
BUN SERPL-MCNC: 27 MG/DL — HIGH (ref 7–23)
CA-I SERPL-SCNC: <0.3 MMOL/L — CRITICAL LOW (ref 1.12–1.3)
CALCIUM SERPL-MCNC: 10 MG/DL — SIGNIFICANT CHANGE UP (ref 8.4–10.5)
CHLORIDE BLDV-SCNC: 92 MMOL/L — LOW (ref 96–108)
CHLORIDE SERPL-SCNC: 97 MMOL/L — SIGNIFICANT CHANGE UP (ref 96–108)
CO2 BLDV-SCNC: 25 MMOL/L — SIGNIFICANT CHANGE UP (ref 22–30)
CO2 SERPL-SCNC: 27 MMOL/L — SIGNIFICANT CHANGE UP (ref 22–31)
COLOR SPEC: SIGNIFICANT CHANGE UP
CREAT SERPL-MCNC: 1.17 MG/DL — SIGNIFICANT CHANGE UP (ref 0.5–1.3)
DIFF PNL FLD: NEGATIVE — SIGNIFICANT CHANGE UP
EOSINOPHIL # BLD AUTO: 0.18 K/UL — SIGNIFICANT CHANGE UP (ref 0–0.5)
EOSINOPHIL NFR BLD AUTO: 1.9 % — SIGNIFICANT CHANGE UP (ref 0–6)
EPI CELLS # UR: 2 /HPF — SIGNIFICANT CHANGE UP
GAS PNL BLDV: 123 MMOL/L — LOW (ref 135–145)
GAS PNL BLDV: SIGNIFICANT CHANGE UP
GAS PNL BLDV: SIGNIFICANT CHANGE UP
GLUCOSE BLDV-MCNC: 143 MG/DL — HIGH (ref 70–99)
GLUCOSE SERPL-MCNC: 147 MG/DL — HIGH (ref 70–99)
GLUCOSE UR QL: NEGATIVE — SIGNIFICANT CHANGE UP
HCO3 BLDV-SCNC: 23 MMOL/L — SIGNIFICANT CHANGE UP (ref 21–29)
HCT VFR BLD CALC: 37.1 % — SIGNIFICANT CHANGE UP (ref 34.5–45)
HCT VFR BLDA CALC: 40 % — SIGNIFICANT CHANGE UP (ref 39–50)
HGB BLD CALC-MCNC: 12.8 G/DL — SIGNIFICANT CHANGE UP (ref 11.5–15.5)
HGB BLD-MCNC: 12.2 G/DL — SIGNIFICANT CHANGE UP (ref 11.5–15.5)
HYALINE CASTS # UR AUTO: 1 /LPF — SIGNIFICANT CHANGE UP (ref 0–2)
IMM GRANULOCYTES NFR BLD AUTO: 0.4 % — SIGNIFICANT CHANGE UP (ref 0–1.5)
INR BLD: 1.23 RATIO — HIGH (ref 0.88–1.16)
KETONES UR-MCNC: NEGATIVE — SIGNIFICANT CHANGE UP
LACTATE BLDV-MCNC: 2.1 MMOL/L — HIGH (ref 0.7–2)
LEUKOCYTE ESTERASE UR-ACNC: ABNORMAL
LIDOCAIN IGE QN: 15 U/L — SIGNIFICANT CHANGE UP (ref 7–60)
LYMPHOCYTES # BLD AUTO: 1.96 K/UL — SIGNIFICANT CHANGE UP (ref 1–3.3)
LYMPHOCYTES # BLD AUTO: 20.9 % — SIGNIFICANT CHANGE UP (ref 13–44)
MCHC RBC-ENTMCNC: 32.1 PG — SIGNIFICANT CHANGE UP (ref 27–34)
MCHC RBC-ENTMCNC: 32.9 GM/DL — SIGNIFICANT CHANGE UP (ref 32–36)
MCV RBC AUTO: 97.6 FL — SIGNIFICANT CHANGE UP (ref 80–100)
MONOCYTES # BLD AUTO: 0.82 K/UL — SIGNIFICANT CHANGE UP (ref 0–0.9)
MONOCYTES NFR BLD AUTO: 8.7 % — SIGNIFICANT CHANGE UP (ref 2–14)
NEUTROPHILS # BLD AUTO: 6.34 K/UL — SIGNIFICANT CHANGE UP (ref 1.8–7.4)
NEUTROPHILS NFR BLD AUTO: 67.6 % — SIGNIFICANT CHANGE UP (ref 43–77)
NITRITE UR-MCNC: NEGATIVE — SIGNIFICANT CHANGE UP
NRBC # BLD: 0 /100 WBCS — SIGNIFICANT CHANGE UP (ref 0–0)
PCO2 BLDV: 82 MMHG — HIGH (ref 35–50)
PH BLDV: 7.07 — CRITICAL LOW (ref 7.35–7.45)
PH UR: 6 — SIGNIFICANT CHANGE UP (ref 5–8)
PLATELET # BLD AUTO: 196 K/UL — SIGNIFICANT CHANGE UP (ref 150–400)
PO2 BLDV: 65 MMHG — HIGH (ref 25–45)
POTASSIUM BLDV-SCNC: >13.8 MMOL/L — CRITICAL HIGH (ref 3.5–5.3)
POTASSIUM SERPL-MCNC: 4.4 MMOL/L — SIGNIFICANT CHANGE UP (ref 3.5–5.3)
POTASSIUM SERPL-SCNC: 4.4 MMOL/L — SIGNIFICANT CHANGE UP (ref 3.5–5.3)
PROT SERPL-MCNC: 7.4 G/DL — SIGNIFICANT CHANGE UP (ref 6–8.3)
PROT UR-MCNC: NEGATIVE — SIGNIFICANT CHANGE UP
PROTHROM AB SERPL-ACNC: 14.6 SEC — HIGH (ref 10.6–13.6)
RBC # BLD: 3.8 M/UL — SIGNIFICANT CHANGE UP (ref 3.8–5.2)
RBC # FLD: 14.4 % — SIGNIFICANT CHANGE UP (ref 10.3–14.5)
RBC CASTS # UR COMP ASSIST: 11 /HPF — HIGH (ref 0–4)
RH IG SCN BLD-IMP: POSITIVE — SIGNIFICANT CHANGE UP
SAO2 % BLDV: 82 % — SIGNIFICANT CHANGE UP (ref 67–88)
SODIUM SERPL-SCNC: 137 MMOL/L — SIGNIFICANT CHANGE UP (ref 135–145)
SP GR SPEC: 1.01 — SIGNIFICANT CHANGE UP (ref 1.01–1.02)
UROBILINOGEN FLD QL: NEGATIVE — SIGNIFICANT CHANGE UP
WBC # BLD: 9.39 K/UL — SIGNIFICANT CHANGE UP (ref 3.8–10.5)
WBC # FLD AUTO: 9.39 K/UL — SIGNIFICANT CHANGE UP (ref 3.8–10.5)
WBC UR QL: 6 /HPF — HIGH (ref 0–5)

## 2021-03-08 PROCEDURE — 71045 X-RAY EXAM CHEST 1 VIEW: CPT | Mod: 26

## 2021-03-08 PROCEDURE — 99285 EMERGENCY DEPT VISIT HI MDM: CPT

## 2021-03-08 PROCEDURE — 74177 CT ABD & PELVIS W/CONTRAST: CPT | Mod: 26,MA

## 2021-03-08 PROCEDURE — 93010 ELECTROCARDIOGRAM REPORT: CPT

## 2021-03-08 RX ORDER — SODIUM CHLORIDE 9 MG/ML
500 INJECTION INTRAMUSCULAR; INTRAVENOUS; SUBCUTANEOUS ONCE
Refills: 0 | Status: DISCONTINUED | OUTPATIENT
Start: 2021-03-08 | End: 2021-03-08

## 2021-03-08 RX ORDER — FENTANYL CITRATE 50 UG/ML
50 INJECTION INTRAVENOUS ONCE
Refills: 0 | Status: DISCONTINUED | OUTPATIENT
Start: 2021-03-08 | End: 2021-03-08

## 2021-03-08 RX ADMIN — FENTANYL CITRATE 50 MICROGRAM(S): 50 INJECTION INTRAVENOUS at 17:20

## 2021-03-08 RX ADMIN — FENTANYL CITRATE 50 MICROGRAM(S): 50 INJECTION INTRAVENOUS at 17:53

## 2021-03-08 NOTE — ED ADULT NURSE NOTE - NSIMPLEMENTINTERV_GEN_ALL_ED
Implemented All Fall Risk Interventions:  Britton to call system. Call bell, personal items and telephone within reach. Instruct patient to call for assistance. Room bathroom lighting operational. Non-slip footwear when patient is off stretcher. Physically safe environment: no spills, clutter or unnecessary equipment. Stretcher in lowest position, wheels locked, appropriate side rails in place. Provide visual cue, wrist band, yellow gown, etc. Monitor gait and stability. Monitor for mental status changes and reorient to person, place, and time. Review medications for side effects contributing to fall risk. Reinforce activity limits and safety measures with patient and family.

## 2021-03-08 NOTE — ED PROVIDER NOTE - OBJECTIVE STATEMENT
75F with ho diverticulitis requiring colostomy, afib on eliquis, DM, CHF presenting with left back pain now radiating to lower abdomen 2 days. Patient otherwise in her normal state of health; denies fevers/chills, n/v/d.

## 2021-03-08 NOTE — ED PROVIDER NOTE - CLINICAL SUMMARY MEDICAL DECISION MAKING FREE TEXT BOX
Attending MD Crow:  75F with ho diverticulitis requiring colostomy, afib on eliquis, DM, CHF presenting with left back pain now radiating to lower abdomen z days. Patient with diffuse ttp throughout abdomen, soft, not frankly peritoneal. Adequate perfusion, afebrile. given complex surgical history, concern for recurrent complicated diverticulitis, colitis, abscess. Plan for CT a/p, labs, analgesia      *The above represents an initial assessment/impression. Please refer to progress notes for potential changes in patient clinical course*

## 2021-03-08 NOTE — ED PROVIDER NOTE - PMH
Asthma    Atrial flutter    Cardiac Pacemaker    Cardiomyopathy    Congestive Heart Failure    Diabetes    Diverticulitis    Gout    HTN - Hypertension    Kidney stone    Patient is Muslim    Refusal of blood transfusions as patient is Muslim    Vertigo

## 2021-03-08 NOTE — ED ADULT NURSE NOTE - PSH
AICD (Automatic Cardioverter/Defibrillator) Present  inserted in Aug, 2008. Due for battery change in 1 month. ( SHERPANDIPITY) . Inserted by Dr Duffy  S/P cholecystectomy    Status post left hemicolectomy

## 2021-03-08 NOTE — ED PROVIDER NOTE - PSH
AICD (Automatic Cardioverter/Defibrillator) Present  inserted in Aug, 2008. Due for battery change in 1 month. ( Shozu) . Inserted by Dr Duffy  S/P cholecystectomy    Status post left hemicolectomy

## 2021-03-08 NOTE — ED ADULT NURSE REASSESSMENT NOTE - NS ED NURSE REASSESS COMMENT FT1
Addendum note:  As per CT scan unable to perform CT scan due to IV 18 gauge rt upper arm not working, Dr. Chan Grady was informed.  Pt returned to room, IV lock removed, MD inserted IV lock 20 Gauge to left upper arm at about 18:30pm.  As per Dr. Grady, MD will inform Ct Scan.

## 2021-03-08 NOTE — ED PROVIDER NOTE - PROGRESS NOTE DETAILS
Richar, PGY2: pt signed out to me pending CT. CT reviewed, nothing to explain LLQ pain. surgery consulted for enterocutaneous fistula. surgery reviewed images with radiology, nothing acute. pt will follow up with Dr. Alvarado in the office Friday. Richar, PGY2: pt able to PO challenge feels better. VSS. Time was taken to answer all of patients questions and concerns. Return precaution instructions were given and patient understands and feels comfortable with disposition.

## 2021-03-08 NOTE — ED PROVIDER NOTE - NS ED ROS FT
General: denies fever, chills  HENT: denies nasal congestion, rhinorrhea  Eyes: denies visual changes, blurred vision  CV: denies chest pain, palpitations  Resp: denies difficulty breathing, cough  Abdominal: abdominal pain  : denies urinary pain or discharge  MSK: denies muscle aches, leg swelling  Neuro: denies headaches, numbness, tingling  Skin: denies rashes, bruises

## 2021-03-08 NOTE — ED PROVIDER NOTE - NSFOLLOWUPINSTRUCTIONS_ED_ALL_ED_FT

## 2021-03-08 NOTE — ED PROVIDER NOTE - PATIENT PORTAL LINK FT
You can access the FollowMyHealth Patient Portal offered by [facility] by registering at the following website: [email or web address]. By joining [email or web address]’s FollowMyHealth portal, you will also be able to view your health information using other applications (apps) compatible with our system.

## 2021-03-08 NOTE — ED PROVIDER NOTE - CARE PROVIDER_API CALL
Clark Alvarado)  Surgery  [address], [address]  [place], [place] [id number]  Phone: [phone]  Fax: [phone]  Established Patient  Follow Up Time: 1-3 Days

## 2021-03-08 NOTE — ED ADULT NURSE REASSESSMENT NOTE - NS ED NURSE REASSESS COMMENT FT1
Report received from Rozina, RN. Pt resting comfortably in bed at this time. A&O x4. States pain has improved after fentanyl dose. VS as documented. Pending CT for dispo. Bed locked and lowered. Comfort and safety measures maintained.

## 2021-03-08 NOTE — ED ADULT NURSE NOTE - CAS DISCH TRANSFER METHOD
FAMILY HISTORY:  Father  Still living? Unknown  Family history of diabetes mellitus, Age at diagnosis: Age Unknown
Private car

## 2021-03-08 NOTE — ED PROVIDER NOTE - PHYSICAL EXAMINATION
GENERAL: well appearing in no acute distress, non-toxic appearing  HEAD: normocephalic, atraumatic  HENT: airway intact  EYES: normal conjunctiva  CARDIAC: regular rate and rhythm, normal S1S2, no appreciable murmurs, 2+ pulses in UE/LE b/l  PULM: normal breath sounds, clear to ascultation bilaterally, no rales, rhonchi, wheezing  GI: abdomen nondistended, soft, diffusely tender throughout; no guarding, rebound tenderness  : no CVA tenderness b/l, no suprapubic tenderness  NEURO: no focal motor or sensory deficits  MSK: no peripheral edema, no calf tenderness b/l  SKIN: well-perfused, extremities warm, no visible rashes  PSYCH: appropriate mood and affect

## 2021-03-08 NOTE — ED ADULT NURSE REASSESSMENT NOTE - NS ED NURSE REASSESS COMMENT FT1
Pt ambulated to bathroom with steady gait. Returned back in bed and resting comfortably. Pending CT results.

## 2021-03-08 NOTE — ED ADULT NURSE NOTE - OBJECTIVE STATEMENT
74 y/o female c/o left flank pain radiating to lower abd.  Pt with history of diverticulitis, and abd surgery.  Pt with lower abd dressing, pt reports (+) drainage on and off x 2 years.  Abd soft, but tender to touch.  Respiration easy and unlabored.  Extremities mobile.  No acute respiratory distress noted. 76 y/o female c/o left flank pain radiating to lower abd.  Pt with history of diverticulitis, and abd surgery.  Pt with lower abd dressing, clean and intact.  Rt abd colostomy in place.  Abd soft, but tender to touch.  Respiration easy and unlabored.  Extremities mobile.  No acute respiratory distress noted. 74 y/o female c/o left flank pain radiating to lower abd.  Pt with history of diverticulitis, and abd surgery.  Pt with lower abd dressing, clean and intact.  Rt abd colostomy in place.  Abd soft, but tender to touch.  Respiration easy and unlabored.  Extremities mobile.  No acute respiratory distress noted.    Pt is a hard stick unable to insert IV lock.  Dr. Lindsey informed.

## 2021-03-09 VITALS
RESPIRATION RATE: 18 BRPM | DIASTOLIC BLOOD PRESSURE: 78 MMHG | SYSTOLIC BLOOD PRESSURE: 122 MMHG | HEART RATE: 97 BPM | OXYGEN SATURATION: 99 % | TEMPERATURE: 97 F

## 2021-03-09 LAB
CULTURE RESULTS: SIGNIFICANT CHANGE UP
SPECIMEN SOURCE: SIGNIFICANT CHANGE UP

## 2021-03-09 PROCEDURE — 85610 PROTHROMBIN TIME: CPT

## 2021-03-09 PROCEDURE — 82803 BLOOD GASES ANY COMBINATION: CPT

## 2021-03-09 PROCEDURE — 84132 ASSAY OF SERUM POTASSIUM: CPT

## 2021-03-09 PROCEDURE — 85014 HEMATOCRIT: CPT

## 2021-03-09 PROCEDURE — 86901 BLOOD TYPING SEROLOGIC RH(D): CPT

## 2021-03-09 PROCEDURE — 82947 ASSAY GLUCOSE BLOOD QUANT: CPT

## 2021-03-09 PROCEDURE — 85730 THROMBOPLASTIN TIME PARTIAL: CPT

## 2021-03-09 PROCEDURE — 83690 ASSAY OF LIPASE: CPT

## 2021-03-09 PROCEDURE — 84100 ASSAY OF PHOSPHORUS: CPT

## 2021-03-09 PROCEDURE — 84295 ASSAY OF SERUM SODIUM: CPT

## 2021-03-09 PROCEDURE — 74177 CT ABD & PELVIS W/CONTRAST: CPT

## 2021-03-09 PROCEDURE — 93005 ELECTROCARDIOGRAM TRACING: CPT

## 2021-03-09 PROCEDURE — 86900 BLOOD TYPING SEROLOGIC ABO: CPT

## 2021-03-09 PROCEDURE — 83735 ASSAY OF MAGNESIUM: CPT

## 2021-03-09 PROCEDURE — 80053 COMPREHEN METABOLIC PANEL: CPT

## 2021-03-09 PROCEDURE — 85025 COMPLETE CBC W/AUTO DIFF WBC: CPT

## 2021-03-09 PROCEDURE — 76937 US GUIDE VASCULAR ACCESS: CPT

## 2021-03-09 PROCEDURE — 82435 ASSAY OF BLOOD CHLORIDE: CPT

## 2021-03-09 PROCEDURE — 86850 RBC ANTIBODY SCREEN: CPT

## 2021-03-09 PROCEDURE — 71045 X-RAY EXAM CHEST 1 VIEW: CPT

## 2021-03-09 PROCEDURE — 96365 THER/PROPH/DIAG IV INF INIT: CPT | Mod: XU

## 2021-03-09 PROCEDURE — 82962 GLUCOSE BLOOD TEST: CPT

## 2021-03-09 PROCEDURE — 83605 ASSAY OF LACTIC ACID: CPT

## 2021-03-09 PROCEDURE — 82330 ASSAY OF CALCIUM: CPT

## 2021-03-09 PROCEDURE — 85018 HEMOGLOBIN: CPT

## 2021-03-09 PROCEDURE — 81001 URINALYSIS AUTO W/SCOPE: CPT

## 2021-03-09 PROCEDURE — 99284 EMERGENCY DEPT VISIT MOD MDM: CPT | Mod: 25

## 2021-03-09 PROCEDURE — 87086 URINE CULTURE/COLONY COUNT: CPT

## 2021-03-09 NOTE — ED ADULT NURSE REASSESSMENT NOTE - NS ED NURSE REASSESS COMMENT FT1
MD Mcqueen at bedside to update pt on CT results. Pt can not eat at this time per MD Mcqueen. Bed locked and lowered. Comfort and safety measures maintained.

## 2021-03-09 NOTE — CONSULT NOTE ADULT - SUBJECTIVE AND OBJECTIVE BOX
General Surgery Consult  Consulting surgical team: [facility]  Consulting attending: Dr. Alvarado    HPI: 75 woman with pmhx of DM2, HTN, COPD, Aflutter on Eliquis, HF with AICD ([employer]tr[employer], interrogated on 2019), and perforated diverticulitis requiring Lupe in 2019 which complicated by enterocutaneous fistula and multiple abscesses requiring IR drainage, is now presenting with Left flank pain. Pt states she's been following up w/ Dr. Alvarado in the office closely, has been having normal PO intake w/ regular gas/stool in ostomy. Of note, pt has a known enterocutaneous fistula x 2, w/ most recent CT scan from Dec of 2020.    In ED, vitals wnl stable, exam showing 2 enterocutaneous fistula in the lower abdomen, and no other skin defect or fistula noted, abdomen NT/ND, ostomy w/ gas and stool, labs unrmarkable, and CT obtained today shows the same EC fistula x 2 but another small air beneath the skin inferior to the previous fistulas, that may vs may not suggest a new EC fistula. Surgery consulted          PAST MEDICAL HISTORY:  Refusal of blood transfusions as patient is Jehovah&#x27;s Witness    Patient is Jehovah&#x27;s Witness    Vertigo    Atrial flutter    Diverticulitis    Cardiomyopathy    Kidney stone    COPD (chronic obstructive pulmonary disease)    Cardiac Pacemaker    HTN - Hypertension    Gout    Diabetes    Congestive Heart Failure    Asthma        PAST SURGICAL HISTORY:  Status post left hemicolectomy    S/P cholecystectomy    AICD (Automatic Cardioverter/Defibrillator) Present    S/P Cholecystectomy        MEDICATIONS:      ALLERGIES:  Coreg (Other)  digoxin (Other; Short breath (Mild to Mod))  metoprolol (Other)  penicillins (Hives)  Solu-Medrol (Other (Mild to Mod))      VITALS & I/Os:  Vital Signs Last 24 Hrs  T(C): 36.4 (08 Mar 2021 22:58), Max: 36.7 (08 Mar 2021 16:10)  T(F): 97.5 (08 Mar 2021 22:58), Max: 98.1 (08 Mar 2021 16:10)  HR: 74 (08 Mar 2021 22:58) (74 - 99)  BP: 105/66 (08 Mar 2021 22:58) (105/66 - 128/78)  BP(mean): --  RR: 19 (08 Mar 2021 22:58) (17 - 19)  SpO2: 96% (08 Mar 2021 22:58) (96% - 100%)    I&O's Summary      PHYSICAL EXAM:  General: No acute distress  Respiratory: Nonlabored  Cardiovascular: RRR  Abdominal:  2 enterocutaneous fistula in the lower abdomen, and no other skin defect or fistula noted, abdomen NT/ND, ostomy w/ gas and stool, No rebound or guarding. No organomegaly, no palpable mass.  Extremities: Warm    LABS:                        12.2   9.39  )-----------( 196      ( 08 Mar 2021 16:28 )             37.1     03-08    137  |  97  |  27<H>  ----------------------------<  147<H>  4.4   |  27  |  1.17    Ca    10.0      08 Mar 2021 16:28  Phos  3.6     03-08  Mg     1.8     03-08    TPro  7.4  /  Alb  3.9  /  TBili  0.4  /  DBili  x   /  AST  16  /  ALT  14  /  AlkPhos  111  03-08    Lactate:  03-08 @ 17:13  2.2  03-08 @ 16:28  2.1    PT/INR - ( 08 Mar 2021 16:28 )   PT: 14.6 sec;   INR: 1.23 ratio         PTT - ( 08 Mar 2021 16:28 )  PTT:30.6 sec          Urinalysis Basic - ( 08 Mar 2021 16:30 )    Color: Light Yellow / Appearance: Clear / S.014 / pH: x  Gluc: x / Ketone: Negative  / Bili: Negative / Urobili: Negative   Blood: x / Protein: Negative / Nitrite: Negative   Leuk Esterase: Small / RBC: 11 /hpf / WBC 6 /HPF   Sq Epi: x / Non Sq Epi: 2 /hpf / Bacteria: Negative        IMAGING:    < from: CT Abdomen and Pelvis w/ IV Cont (21 @ 21:53) >    EXAM:  CT ABDOMEN AND PELVIS IC                            PROCEDURE DATE:  2021            INTERPRETATION:  CLINICAL INFORMATION: Left lower quadrant pain, history of diverticulitis with resection colostomy. Drainage from surgical site    COMPARISON: CT abdomen pelvis from 2020.    CONTRAST/COMPLICATIONS:  IV Contrast: Omnipaque 350 / 90 cc were administered / 10 cc were discarded.  Oral Contrast:  Complications:    PROCEDURE:  CT of the Abdomen and Pelvis was performed.  Sagittal and coronal reformats were performed.    FINDINGS:  LOWER CHEST: Partially visualized. Tripolar AICD leads. Linear subsegmental atelectasis in the right middle lobe.    LIVER: Within normal limits.  BILE DUCTS: Normal caliber.  GALLBLADDER: Cholecystectomy.  SPLEEN: Within normal limits.  PANCREAS: Within normal limits.  ADRENALS: Within normal limits.  KIDNEYS/URETERS: No hydronephrosis. . Nonobstructing left renal calculi measuring up to 7 mm, unchanged. Subcentimeter right renal hypodensity is too small to characterize. No hydronephrosis.    BLADDER: Within normal limits.  REPRODUCTIVE ORGANS: Myomatous uterus.    BOWEL:Status post left hemicolectomy Lupe procedure with the right lower quadrant colostomy.. No bowel obstruction. Appendixis unremarkable. Colonic diverticulosis without evidence of acute diverticulitis. Small duodenal diverticulum in the third portion.  PERITONEUM: No ascites.  VESSELS: Atherosclerotic changes.  RETROPERITONEUM/LYMPH NODES: No lymphadenopathy.  ABDOMINAL WALL: Large rectus diastases with table ventral abdominal hernia containing nonobstructed loops of small bowel, large bowel and gastric body/antrum. Left lower quadrant intra-abdominal open surgical wound without evidence of a discrete flexion/abscess or subcutaneous air to suggest acute inflammation. Redemonstration of chronic stigmata from 2 prior fistulous tracks within the left intra-abdominal wall. Soft tissue tract extending from the inferior ventral abdominal wall to the skin surface with tiny foci of gas concerning for enterocutaneous fistula (601:89 and 2:83).  BONES: Degenerative changes.    IMPRESSION:  Soft tissue tract extending from the inferior ventral abdominal wall to the skin surface with tiny foci of gas suggests a new enterocutaneous fistula. No drainable fluid collection.              TARSHA RODRÍGUEZ MD; Resident Interventional Radiology  This document has been electronically signed.  CORI TAYLOR MD; Attending Radiologist  This document has been electronically signed.Mar  8 2021 11:59PM    < end of copied text >

## 2021-03-09 NOTE — CONSULT NOTE ADULT - ASSESSMENT
75F w/ multiple medical hx including hx of Harmann due to perforated diverticulitis c/b enterocutaneous fistulas x 2 that have been getting closely monitored by Dr. Alvarado in the office.    - No acute surgical intervention indicated at this time  - Reviewed the imaging in detail w/ Radiologist on call, the small bubble of air suggestive of new EC fistula could also be due to her pannus folding over on top of the skin. Otherwise, CT shows no intra abdominal pathology, and pt has no complaints  - Ok to have the pt discharged after PO challenge, from surgical standpoint, and have the pt follow up w/ Dr. Alvarado in the office.  - Discussed w/ Surgery fellow on call    RACH Harp PGY-3  [facility]

## 2021-03-12 ENCOUNTER — APPOINTMENT (OUTPATIENT)
Dept: SURGERY | Facility: CLINIC | Age: 76
End: 2021-03-12
Payer: MEDICARE

## 2021-03-12 VITALS
WEIGHT: 163 LBS | RESPIRATION RATE: 16 BRPM | BODY MASS INDEX: 29.81 KG/M2 | HEART RATE: 93 BPM | SYSTOLIC BLOOD PRESSURE: 91 MMHG | OXYGEN SATURATION: 99 % | DIASTOLIC BLOOD PRESSURE: 84 MMHG

## 2021-03-12 PROCEDURE — 99213 OFFICE O/P EST LOW 20 MIN: CPT

## 2021-03-12 PROCEDURE — 99072 ADDL SUPL MATRL&STAF TM PHE: CPT

## 2021-03-12 NOTE — HISTORY OF PRESENT ILLNESS
[de-identified] : Pretty Dolan is a 74 y/o female S/P ex-lap and Lupe's procedure for perforated diverticulitis in July 2019 with abdomen left open, multiple washouts, then abdominal closure with Vicryl mesh (fascia left open) and skin graft over wound. This was complicated by a rectal stump leak with resultant colocutaneous fistula. She is here for a follow up visit.\par \par The patient went to the ER last week for complaints of left flank pain.  A CT scan was performed of the time, demonstrating no acute change.  She has left-sided kidney stones, which have been present for some time and are likely the cause of her symptoms.  She was treated for pain and released.  She feels well otherwise and has not had any other episodes of pain since then.  Ostomy is functioning normally.  She had a small bit of dermatitis at her abdominal skin fold, which her daughter treated with nystatin powder and seems to be much improved.  She reports no fevers, chills, or malaise.  She continues to do dressing changes at her fistula sites 2-3 times per day.

## 2021-03-12 NOTE — PLAN
[FreeTextEntry1] : Continue daily dressing changes 2-3 times per day as needed.\par Nystatin powder prescription provided.\par Patient is to have renal ultrasound and follow-up with her PCP versus urology.\par \par Follow-up 6 weeks

## 2021-03-12 NOTE — PHYSICAL EXAM
[de-identified] : Well-appearing, appears stated age, no acute distress, uses wheelchair [de-identified] : Normocephalic, sclerae anicteric, mucous membranes moist [de-identified] : Normal respirations [de-identified] : Soft, nontender, nondistended.  Right upper quadrant colostomy appears normal with stool in bag.  Midline ventral incisional hernia is soft, reducible, with no overlying skin changes.  2 small fistula sites in lower abdomen with granulation tissue at their opening sites.  Skin around them is clean and dry and non-erythematous.

## 2021-03-12 NOTE — ASSESSMENT
[FreeTextEntry1] : 74F s/p ex-lap and Lupe's procedure for perforated diverticulitis in July 2019 with abdomen left open, multiple washouts, then abdominal closure with vicryl mesh (fascia left open) and skin graft over wound.  She subsequently developed a rectal stump leak, with persistent low output colocutaneous fistula x2 which are well controlled.  No evidence of acute cellulitis or infection.\par \par Recent episode of left flank pain possibly secondary to kidney stones.\par \par \par \par

## 2021-03-17 ENCOUNTER — OUTPATIENT (OUTPATIENT)
Dept: OUTPATIENT SERVICES | Facility: HOSPITAL | Age: 76
LOS: 1 days | End: 2021-03-17
Payer: MEDICARE

## 2021-03-17 ENCOUNTER — APPOINTMENT (OUTPATIENT)
Dept: ULTRASOUND IMAGING | Facility: IMAGING CENTER | Age: 76
End: 2021-03-17
Payer: MEDICARE

## 2021-03-17 ENCOUNTER — RESULT REVIEW (OUTPATIENT)
Age: 76
End: 2021-03-17

## 2021-03-17 DIAGNOSIS — R10.9 UNSPECIFIED ABDOMINAL PAIN: ICD-10-CM

## 2021-03-17 DIAGNOSIS — Z90.49 ACQUIRED ABSENCE OF OTHER SPECIFIED PARTS OF DIGESTIVE TRACT: Chronic | ICD-10-CM

## 2021-03-17 PROCEDURE — 76775 US EXAM ABDO BACK WALL LIM: CPT

## 2021-03-17 PROCEDURE — 76775 US EXAM ABDO BACK WALL LIM: CPT | Mod: 26

## 2021-04-02 RX ORDER — SPIRONOLACTONE 25 MG/1
25 TABLET ORAL 3 TIMES DAILY
Qty: 90 | Refills: 3 | Status: ACTIVE | COMMUNITY
Start: 2017-12-13 | End: 1900-01-01

## 2021-04-03 NOTE — DIETITIAN INITIAL EVALUATION ADULT. - PROBLEM SELECTOR PLAN 7
no
lovenox for dvt  Note Jehovas witness and therefore refusing blood products, documented in chart signed by patient

## 2021-04-12 NOTE — ED PROVIDER NOTE - NEURO NEGATIVE STATEMENT, MLM
Fetal Surveillance:  FHT: 130  Accels present   Moderate variability  Uterine activity: [id number]    SCE:  /-3    -CEFM  -Continue pitocin titration  - Continue PCN  - Anticipate     Frances Abdalla MD, MPH  Family Medicine PGY-1   no loss of consciousness, no gait abnormality, no headache, no sensory deficits, and no weakness.

## 2021-04-21 ENCOUNTER — APPOINTMENT (OUTPATIENT)
Dept: HEART FAILURE | Facility: CLINIC | Age: 76
End: 2021-04-21
Payer: MEDICARE

## 2021-04-21 VITALS
DIASTOLIC BLOOD PRESSURE: 63 MMHG | SYSTOLIC BLOOD PRESSURE: 97 MMHG | HEART RATE: 86 BPM | HEIGHT: 62 IN | OXYGEN SATURATION: 98 % | TEMPERATURE: 97.8 F | BODY MASS INDEX: 30.36 KG/M2 | WEIGHT: 165 LBS | RESPIRATION RATE: 16 BRPM

## 2021-04-21 PROCEDURE — 99215 OFFICE O/P EST HI 40 MIN: CPT

## 2021-04-21 PROCEDURE — 99072 ADDL SUPL MATRL&STAF TM PHE: CPT

## 2021-04-21 RX ORDER — ASPIRIN 81 MG/1
81 TABLET, DELAYED RELEASE ORAL DAILY
Refills: 0 | Status: DISCONTINUED | COMMUNITY
Start: 2020-03-09 | End: 2021-04-21

## 2021-04-21 NOTE — HISTORY OF PRESENT ILLNESS
[FreeTextEntry1] : Referring: previously followed with Dr. Hobson \par \par Ms Dolan is a 77 YO F with a history of ACC/AHA Stage C NICM with improved LVEF (LVEF 35->50%) s/p CRT-D, paroxysmal atrial flutter on eliquis, severe asthma perforated diverticulitis s/p Lupe 7/2019 c/b enterocutaneous fistula and multiple abscesses requiring IR drainage presenting to HF clinic for further management. \par \par She received a pacemaker many years ago, which was subsequently upgraded to CRT-D ( [employer]tr[employer]) device in 2013.  An echocardiogram done in 2016, revealed an EF of 35%, although repeat echocardiograms have shown only minimal LV Dysfunction.  Her other comorbidities include a history of GI bleed and a history of respiratory failure leaded to tracheostomy and PEG in 2016 which have since been removed. More recently she had a prolonged hospitalization due to a perforated diverticulum leading to an exploratory laparotomy, partial colectomy, and end colostomy. Her course has been complicated by infections, but she has gradually improved. \par \par She reports today for HF followup. She feels overall and feels limited primarily by weakness. Denies any orthopnea or lower extremity edema. She occasionally notes dyspnea when bathing herself in the morning but overall improving. Denies dizziness or lightheadedness.  Denies fevers or chills.

## 2021-04-21 NOTE — DISCUSSION/SUMMARY
[FreeTextEntry1] : 1) Chronic systolic heart failure: \par - GDMT: current regimen is enalapril 10 BID and spironolactone 25 TID. She is intolerant of BB due to severe bronchospastic disease. Will continue\par - Diuretics: current regimen is lasix 40 mg BID. will continue \par - Device: s/p CRT-D, follows with Dr. Newton\par - Labs: 1/2021\par \par 2) Atrial fibrillation: \par - Will continue Eliquis\par \par 3) Health care maintenance\par - Will stop ASA given normal coronary arteries and concurrent anticoagulation \par \par RTC in 6 months\par

## 2021-04-21 NOTE — PHYSICAL EXAM
[General Appearance - Well Developed] : well developed [Well Groomed] : well groomed [General Appearance - Well Nourished] : well nourished [General Appearance - In No Acute Distress] : no acute distress [Normal Conjunctiva] : the conjunctiva exhibited no abnormalities [] : no respiratory distress [Respiration, Rhythm And Depth] : normal respiratory rhythm and effort [Heart Rate And Rhythm] : heart rate and rhythm were normal [Heart Sounds] : normal S1 and S2 [Edema] : no peripheral edema present [Arterial Pulses Normal] : the arterial pulses were normal [Bowel Sounds] : normal bowel sounds [Abdomen Tenderness] : non-tender [Abdomen Soft] : soft [Abnormal Walk] : normal gait [Nail Clubbing] : no clubbing of the fingernails [Cyanosis, Localized] : no localized cyanosis [Skin Color & Pigmentation] : normal skin color and pigmentation [Skin Turgor] : normal skin turgor [No Venous Stasis] : no venous stasis [Oriented To Time, Place, And Person] : oriented to person, place, and time [Impaired Insight] : insight and judgment were intact [Affect] : the affect was normal [Mood] : the mood was normal [FreeTextEntry1] : warm peripherally

## 2021-04-21 NOTE — ASSESSMENT
[FreeTextEntry1] : 77 YO F with a history of ACC/AHA Stage C NICM with improved LVEF (LVEF 35->50%) s/p CRT-D, paroxysmal atrial flutter on eliquis, severe asthma perforated diverticulitis s/p Lupe 7/2019 c/b enterocutaneous fistula and multiple abscesses requiring IR drainage presenting to HF clinic for further management. \par \par Today she appears euvolemic on exam and reports NYHA II-III symptoms which is multifactorial and I suspect driven by her bronchospastic pulmonary disease with wheezing heard on exam. She appears well compensated from a HF perspective. She has only minimal LV dysfunction with normal BNP values\par \par

## 2021-04-23 ENCOUNTER — APPOINTMENT (OUTPATIENT)
Dept: SURGERY | Facility: CLINIC | Age: 76
End: 2021-04-23
Payer: MEDICARE

## 2021-04-23 VITALS
HEART RATE: 99 BPM | OXYGEN SATURATION: 97 % | SYSTOLIC BLOOD PRESSURE: 136 MMHG | RESPIRATION RATE: 17 BRPM | DIASTOLIC BLOOD PRESSURE: 81 MMHG | TEMPERATURE: 97.5 F

## 2021-04-23 PROCEDURE — 99072 ADDL SUPL MATRL&STAF TM PHE: CPT

## 2021-04-23 PROCEDURE — 99213 OFFICE O/P EST LOW 20 MIN: CPT

## 2021-04-23 NOTE — HISTORY OF PRESENT ILLNESS
[de-identified] : Pretty Dolan is a 76 y/o female S/P ex-lap and Lupe's procedure for perforated diverticulitis in July 2019 with abdomen left open, multiple washouts, then abdominal closure with Vicryl mesh (fascia left open) and skin graft over wound. \par  [de-identified] : PT reports she is feeling well, overall. Last week, she had increased output from her EC fistula which was mostly dark red in color. No erythema, induration, fevers or chills. The output improved this week. She is still eating well, taking a variety of food, drinking glucerna shakes. She is not very active and is in bed most of the day. She gets out of bed to make food but doesn't go outside in the yard or go for walks. She reports mostly feeling bored and not wanting to take risks with COVID.

## 2021-04-23 NOTE — PLAN
[FreeTextEntry1] : [] continue nutritional optimization \par [] increase activity by walking in the yard when pollen subsides\par [] continue excellent wound care \par [] follow up in 2 months

## 2021-04-23 NOTE — ASSESSMENT
[FreeTextEntry1] : 75yo F s/p kal's and vicryl mesh closure 7/2019 complicated by EC fistula, presenting for follow up. No significant changes. Some granulation tissue at fistula sites, cauterized with silver nitrate. \par

## 2021-04-23 NOTE — REASON FOR VISIT
[Post Op: _________] : a [unfilled] post op visit [FreeTextEntry1] : s/p kal's for perforated diverticulitis

## 2021-04-23 NOTE — PHYSICAL EXAM
[Normal Breath Sounds] : Normal breath sounds [Normal Heart Sounds] : normal heart sounds [Calm] : calm [de-identified] : NAD [de-identified] : Soft, nondistended, nontender. RUQ colostomy pink and viable with stool in bag. lower abdominal EC fistulas with pink granulation tissue and pink/brown discharge. no erythema.  [de-identified] : no deformities, walks with walker

## 2021-05-17 ENCOUNTER — APPOINTMENT (OUTPATIENT)
Dept: OBGYN | Facility: CLINIC | Age: 76
End: 2021-05-17

## 2021-05-28 ENCOUNTER — APPOINTMENT (OUTPATIENT)
Dept: ELECTROPHYSIOLOGY | Facility: CLINIC | Age: 76
End: 2021-05-28
Payer: MEDICARE

## 2021-05-28 ENCOUNTER — NON-APPOINTMENT (OUTPATIENT)
Age: 76
End: 2021-05-28

## 2021-05-28 PROCEDURE — 93295 DEV INTERROG REMOTE 1/2/MLT: CPT

## 2021-05-28 PROCEDURE — 93296 REM INTERROG EVL PM/IDS: CPT

## 2021-06-02 NOTE — PRE-ANESTHESIA EVALUATION ADULT - NSATTENDATTESTRD_GEN_ALL_CORE
The patient has been re-examined and I agree with the above assessment or I updated with my findings.
Never
The patient has been re-examined and I agree with the above assessment or I updated with my findings.

## 2021-06-18 NOTE — ED POST DISCHARGE NOTE - DETAILS
05/19/18 approx 1450. left voicemail  for patient to call back ER. Sean BONNER left VM for c/b in regards to contaminated urine specimen needing repeat Ua/UCx from outpatient MD. -Shira More PA-C spoke with patient and made aware to follow up with PMD - Olga Paul PA-C no

## 2021-06-19 ENCOUNTER — INPATIENT (INPATIENT)
Facility: HOSPITAL | Age: 76
LOS: 2 days | Discharge: HOME | DRG: 556 | End: 2021-06-22
Attending: INTERNAL MEDICINE | Admitting: INTERNAL MEDICINE
Payer: MEDICARE

## 2021-06-19 VITALS
OXYGEN SATURATION: 97 % | WEIGHT: 160.06 LBS | RESPIRATION RATE: 17 BRPM | DIASTOLIC BLOOD PRESSURE: 69 MMHG | HEART RATE: 87 BPM | TEMPERATURE: 98 F | HEIGHT: 62 IN | SYSTOLIC BLOOD PRESSURE: 107 MMHG

## 2021-06-19 DIAGNOSIS — Z90.49 ACQUIRED ABSENCE OF OTHER SPECIFIED PARTS OF DIGESTIVE TRACT: Chronic | ICD-10-CM

## 2021-06-19 LAB
ALBUMIN SERPL ELPH-MCNC: 3.3 G/DL — SIGNIFICANT CHANGE UP (ref 3.3–5)
ALP SERPL-CCNC: 101 U/L — SIGNIFICANT CHANGE UP (ref 40–120)
ALT FLD-CCNC: 22 U/L — SIGNIFICANT CHANGE UP (ref 10–45)
ANION GAP SERPL CALC-SCNC: 15 MMOL/L — SIGNIFICANT CHANGE UP (ref 5–17)
AST SERPL-CCNC: 29 U/L — SIGNIFICANT CHANGE UP (ref 10–40)
BASOPHILS # BLD AUTO: 0.02 K/UL — SIGNIFICANT CHANGE UP (ref 0–0.2)
BASOPHILS NFR BLD AUTO: 0.3 % — SIGNIFICANT CHANGE UP (ref 0–2)
BILIRUB SERPL-MCNC: 0.2 MG/DL — SIGNIFICANT CHANGE UP (ref 0.2–1.2)
BUN SERPL-MCNC: 21 MG/DL — SIGNIFICANT CHANGE UP (ref 7–23)
CALCIUM SERPL-MCNC: 8.8 MG/DL — SIGNIFICANT CHANGE UP (ref 8.4–10.5)
CHLORIDE SERPL-SCNC: 101 MMOL/L — SIGNIFICANT CHANGE UP (ref 96–108)
CO2 SERPL-SCNC: 22 MMOL/L — SIGNIFICANT CHANGE UP (ref 22–31)
CREAT SERPL-MCNC: 0.95 MG/DL — SIGNIFICANT CHANGE UP (ref 0.5–1.3)
EOSINOPHIL # BLD AUTO: 0.15 K/UL — SIGNIFICANT CHANGE UP (ref 0–0.5)
EOSINOPHIL NFR BLD AUTO: 1.9 % — SIGNIFICANT CHANGE UP (ref 0–6)
GAS PNL BLDV: SIGNIFICANT CHANGE UP
GLUCOSE SERPL-MCNC: 82 MG/DL — SIGNIFICANT CHANGE UP (ref 70–99)
HCT VFR BLD CALC: 31.9 % — LOW (ref 34.5–45)
HGB BLD-MCNC: 10.4 G/DL — LOW (ref 11.5–15.5)
IMM GRANULOCYTES NFR BLD AUTO: 0.3 % — SIGNIFICANT CHANGE UP (ref 0–1.5)
LYMPHOCYTES # BLD AUTO: 1.04 K/UL — SIGNIFICANT CHANGE UP (ref 1–3.3)
LYMPHOCYTES # BLD AUTO: 13.5 % — SIGNIFICANT CHANGE UP (ref 13–44)
MCHC RBC-ENTMCNC: 31.8 PG — SIGNIFICANT CHANGE UP (ref 27–34)
MCHC RBC-ENTMCNC: 32.6 GM/DL — SIGNIFICANT CHANGE UP (ref 32–36)
MCV RBC AUTO: 97.6 FL — SIGNIFICANT CHANGE UP (ref 80–100)
MONOCYTES # BLD AUTO: 0.69 K/UL — SIGNIFICANT CHANGE UP (ref 0–0.9)
MONOCYTES NFR BLD AUTO: 8.9 % — SIGNIFICANT CHANGE UP (ref 2–14)
NEUTROPHILS # BLD AUTO: 5.81 K/UL — SIGNIFICANT CHANGE UP (ref 1.8–7.4)
NEUTROPHILS NFR BLD AUTO: 75.1 % — SIGNIFICANT CHANGE UP (ref 43–77)
NRBC # BLD: 0 /100 WBCS — SIGNIFICANT CHANGE UP (ref 0–0)
NT-PROBNP SERPL-SCNC: 118 PG/ML — SIGNIFICANT CHANGE UP (ref 0–300)
PLATELET # BLD AUTO: 148 K/UL — LOW (ref 150–400)
POTASSIUM SERPL-MCNC: 4.6 MMOL/L — SIGNIFICANT CHANGE UP (ref 3.5–5.3)
POTASSIUM SERPL-SCNC: 4.6 MMOL/L — SIGNIFICANT CHANGE UP (ref 3.5–5.3)
PROT SERPL-MCNC: 6.5 G/DL — SIGNIFICANT CHANGE UP (ref 6–8.3)
RBC # BLD: 3.27 M/UL — LOW (ref 3.8–5.2)
RBC # FLD: 14 % — SIGNIFICANT CHANGE UP (ref 10.3–14.5)
SODIUM SERPL-SCNC: 138 MMOL/L — SIGNIFICANT CHANGE UP (ref 135–145)
TROPONIN T, HIGH SENSITIVITY RESULT: 49 NG/L — SIGNIFICANT CHANGE UP (ref 0–51)
TROPONIN T, HIGH SENSITIVITY RESULT: 55 NG/L — HIGH (ref 0–51)
WBC # BLD: 7.73 K/UL — SIGNIFICANT CHANGE UP (ref 3.8–10.5)
WBC # FLD AUTO: 7.73 K/UL — SIGNIFICANT CHANGE UP (ref 3.8–10.5)

## 2021-06-19 PROCEDURE — 74177 CT ABD & PELVIS W/CONTRAST: CPT | Mod: 26,MA

## 2021-06-19 PROCEDURE — 93970 EXTREMITY STUDY: CPT | Mod: 26

## 2021-06-19 PROCEDURE — 93010 ELECTROCARDIOGRAM REPORT: CPT

## 2021-06-19 PROCEDURE — 99285 EMERGENCY DEPT VISIT HI MDM: CPT

## 2021-06-19 RX ORDER — SODIUM CHLORIDE 9 MG/ML
1000 INJECTION, SOLUTION INTRAVENOUS ONCE
Refills: 0 | Status: COMPLETED | OUTPATIENT
Start: 2021-06-19 | End: 2021-06-19

## 2021-06-19 RX ADMIN — SODIUM CHLORIDE 1000 MILLILITER(S): 9 INJECTION, SOLUTION INTRAVENOUS at 17:28

## 2021-06-19 NOTE — ED PROVIDER NOTE - ATTENDING CONTRIBUTION TO CARE
76 F w/ hx of DM2, HTN, COPD, Aflutter on Eliquis, HF with AICD ([employer], interrogated on 09/02/2019), and perforated diverticulitis requiring Lupe in July 2019 which complicated by enterocutaneous fistula and multiple abscesses requiring IR drainage, presents w/ two complaints, increasing drainage from the EC fistula and leg pain. Pt is accompanied w/ her daughter who states that the patient has had worsening abdominal pain. No cp, no sob, +nausea no vomiting, normal ostomy output. Pt w/ bilateral leg pain the pains start intermittently and seem to travel from the legs to the abdomen last for a few seconds is sharp severe and then self resolves. Pt also states that she is compliant w/ eliquis. Pt w/ recent dx of bronchitis/asthma, is on azithromycin and prednisone.   On exam, I have reviewed the triage vital signs. Const: obese female, Well-nourished, Well-developed, Eyes: no conjunctival injection and no scleral icterus ENMT: Moist mucus membranes, CVS: +S1/S2, radial/DP pulse 2+ bilaterally w/ no pitting edema RESP: Unlabored respiratory effort, GI: 6 cm enterocutaneous fistula w/ foul smelling purulent drainage and approx 1 cm fistula in the lower abdomen, no surrounding erythema, soft abdomen, no CVA tenderness MSK: Extremities w/o deformity or ttp, Psych: Awake, Alert, & Orientedx3;  Appropriate mood and affect, cooperative  Plan for labs, ekg and consult w/ colorectal sx as pt is well known to the service, will obtain duplex of lower extremities to assess for possible dvt, though pt reports compliance w/ eliquis.

## 2021-06-19 NOTE — ED ADULT NURSE REASSESSMENT NOTE - NS ED NURSE REASSESS COMMENT FT1
Patient taken to CT, not requiring tele monitoring for CT as per MD Mignon. Safety measures in place.

## 2021-06-19 NOTE — CONSULT NOTE ADULT - SUBJECTIVE AND OBJECTIVE BOX
HPI:      PAST MEDICAL & SURGICAL HISTORY:  Asthma    Congestive Heart Failure    Diabetes    Gout    HTN - Hypertension    Cardiac Pacemaker    Kidney stone    Cardiomyopathy    Diverticulitis    Atrial flutter    Vertigo    Patient is Jehovah&#x27;s Witness    Refusal of blood transfusions as patient is Jehovah&#x27;s Witness    AICD (Automatic Cardioverter/Defibrillator) Present  inserted in Aug, 2008. Due for battery change in 1 month. ( [employer]) . Inserted by Dr Duffy    S/P cholecystectomy    Status post left hemicolectomy        ROS: Negative except for HPI    MEDICATIONS:  Home Medications:  apixaban 5 mg oral tablet: 1 tab(s) orally every 12 hours (15 Noah 2020 10:50)  ascorbic acid 500 mg oral tablet: 1 tab(s) orally once a day (12 Dec 2019 23:50)  Aspir 81 oral delayed release tablet: 1 tab(s) orally once a day (12 Dec 2019 23:50)  atorvastatin 20 mg oral tablet: 1 tab(s) orally once a day (at bedtime) (12 Dec 2019 23:50)  cetirizine 10 mg oral tablet: 1 tab(s) orally once a day (12 Dec 2019 23:50)  doxycycline monohydrate 100 mg oral capsule: 1 cap(s) orally every 12 hours till 1/17 (15 Noah 2020 10:50)  enalapril 10 mg oral tablet: 1 tab(s) orally 2 times a day (12 Dec 2019 23:50)  ferrous sulfate 325 mg (65 mg elemental iron) oral tablet: 1 tab(s) orally 2 times a day (12 Dec 2019 23:50)  folic acid 1 mg oral tablet: 1 tab(s) orally once a day (12 Dec 2019 23:50)  furosemide 40 mg oral tablet: 1  orally 2 times a day (12 Dec 2019 23:50)  Keflex 500 mg oral capsule: 1 cap(s) orally every 8 hours till 1/17 (15 Noah 2020 10:50)  magnesium oxide 500 mg oral tablet: 1 tab(s) orally 3 times a day max daily dose 3 tablets  (12 Dec 2019 23:50)  metroNIDAZOLE 500 mg oral tablet: 1 tab(s) orally every 12 hours till 1/17 (15 Noah 2020 10:50)  omeprazole 40 mg oral delayed release capsule: 1 cap(s) orally once a day (12 Dec 2019 23:50)  potassium chloride 20 mEq/15 mL oral liquid: 15 milliliter(s) orally once a day only as needed  (12 Dec 2019 23:50)  repaglinide 0.5 mg oral tablet: 1 tab(s) orally 3 times a day (before meals) (12 Dec 2019 23:50)  spironolactone 25 mg oral tablet: 1 tab(s) orally once a day (12 Dec 2019 23:50)  Symbicort 160 mcg-4.5 mcg/inh inhalation aerosol: 2 puff(s) inhaled once a day (at bedtime) (12 Dec 2019 23:50)  Ventolin HFA 90 mcg/inh inhalation aerosol: 1 puff(s) inhaled every 4 hours-6 hours as needed  (12 Dec 2019 23:50)      Allergies    Coreg (Other)  digoxin (Other; Short breath (Mild to Mod))  penicillins (Hives)    Intolerances    metoprolol (Other)  Solu-Medrol (Other (Mild to Mod))      SOCIAL HISTORY: Denies tobacco, social ETOH, denies illicit drug use    FAMILY HISTORY:  Family history of brain tumor (Father)        Vital Signs Last 24 Hrs  T(C): 36.6 (19 Jun 2021 19:38), Max: 36.9 (19 Jun 2021 15:27)  T(F): 97.8 (19 Jun 2021 19:38), Max: 98.5 (19 Jun 2021 15:27)  HR: 86 (19 Jun 2021 19:38) (71 - 87)  BP: 120/66 (19 Jun 2021 19:38) (96/49 - 120/66)  BP(mean): 64 (19 Jun 2021 15:27) (64 - 64)  RR: 20 (19 Jun 2021 19:38) (16 - 20)  SpO2: 95% (19 Jun 2021 19:38) (95% - 98%)    PHYSICAL EXAM:    Constitutional: NAD  HEENT: PERRLA, EOMI  Respiratory: Unlabored   Cardiovascular: RRR  Gastrointestinal: soft, NT/ND. 2 enterocutaneous fistulas noted in the lower abdomen with minimal pinkish/brown drainage. Skin graft noted, well healed. Ostomy w/ gas in bag, No rebound or guarding.  Extremities: No peripheral edema  Vascular: 2+ peripheral pulses  Neurological: A/O x 3, no focal deficits      LABS:                        10.4   7.73  )-----------( 148      ( 19 Jun 2021 16:56 )             31.9     06-19    138  |  101  |  21  ----------------------------<  82  4.6   |  22  |  0.95    Ca    8.8      19 Jun 2021 16:56    TPro  6.5  /  Alb  3.3  /  TBili  0.2  /  DBili  x   /  AST  29  /  ALT  22  /  AlkPhos  101  06-19        RADIOLOGY & ADDITIONAL STUDIES:    EXAM:  CT ABDOMEN AND PELVIS OC IC                            PROCEDURE DATE:  06/19/2021        INTERPRETATION:  CLINICAL INFORMATION: Abdominal pain with known enterocutaneous fistula.    COMPARISON: CT abdomen pelvis 3/20/2021.    CONTRAST/COMPLICATIONS:  IV Contrast: Omnipaque 350.  90 cc administered.   10 cc discarded.  Oral Contrast: Omnipaque 300.  Complications: None.    PROCEDURE:  CT of the Abdomen and Pelvis was performed.  Sagittal and coronal reformats were performed.    FINDINGS:  LOWER CHEST: Within normal limits.    LIVER: A portion of the left hepatic lobe is contained within a ventral abdominal hernia, otherwise within normal limits.  BILE DUCTS: Normal caliber.  GALLBLADDER: Cholecystectomy.  SPLEEN: Within normal limits.  PANCREAS: Within normal limits.  ADRENALS: Within normal limits.  KIDNEYS/URETERS: Multiple left-sided intrarenal calculi, the largest in the inferior pole measuring up to 5 mm. No hydronephrosis bilaterally.    BLADDER: Within normal limits.  REPRODUCTIVE ORGANS: Fibroid uterus. No adnexal masses.    BOWEL: A portion of the stomach as well as loops of small and large bowel contained within ventral abdominal hernia. No bowel obstruction. Appendix is normal.  PERITONEUM: No ascites.  VESSELS: Within normal limits.  RETROPERITONEUM/LYMPH NODES: No lymphadenopathy.  ABDOMINAL WALL: Rectus diastasis with large ventral abdominal hernia containing loops of small and large bowel as well as a portion of the left hepatic lobe and a portion of the stomach. Redemonstration of chronic stigmata from prior fistulous tracts in the anterior abdominal wall. Fistulous tract noted in the inferior ventral abdominal wall on prior scan does not appear patent. No new enterocutaneous fistula is identified.  BONES: Degenerative changes.    IMPRESSION:    *  Redemonstration of rectus diastasis with large ventral abdominal hernia, as above. Re-demonstration of chronic stigmata from prior fistulous tracts in the abdominal wall. No new enterocutaneous fistula is identified.  *  Multiple left-sided intrarenal calculi. No hydronephrosis.   HPI:  75 woman with pmhx of DM2, HTN, COPD, Aflutter on Eliquis, HF with AICD ([employer], interrogated on 09/02/2019), and perforated diverticulitis requiring Lupe in July 2019 which complicated by enterocutaneous fistula and multiple abscesses requiring IR drainage (most recently in Dec 2019 on the left anterior abdominal wall) and admissions in the past for high EC fistula output, now presenting with 1 day of LE and generalized body pains. Patient states she was in her usual state of health until earlier today when she developed bilteral LE cramps. She states this has happened to her in the past, however states that this time pain persisted and she subsequently developed similar spasms throughout her body, including bilateral flanks. She states that the bilateral flank spasms normally occur with coughing after multiple abdominal surgeries. Patient and her daughter states that patient has been eating and drinking normally, passing stool and flatus via ostomy at baseline. She denies nausea, vomiting, fever, chills, cp, sob, change in stool, urinary symptoms or increased EC fistula output. Patient states fistula output has progressively decreased and quality of output is unchanged.     In the ED the patient was afebrile and initially hypotensive to 90s-100s w/ improvement to 120s after 1L bolus. Labs show no leukocytosis. Troponins initially elevated to 55, now downtrending to 49. CT scan showed redemonstration of known rectus diastasis with large ventral abdominal hernia and chronic changes associated with prior fistulous tracts. No evidence of new EC fistula identified. Patient currently endorsing hunger.       PAST MEDICAL & SURGICAL HISTORY:  Asthma    Congestive Heart Failure    Diabetes    Gout    HTN - Hypertension    Cardiac Pacemaker    Kidney stone    Cardiomyopathy    Diverticulitis    Atrial flutter    Vertigo    Patient is Jehovah&#x27;s Witness    Refusal of blood transfusions as patient is Jehovah&#x27;s Witness    AICD (Automatic Cardioverter/Defibrillator) Present  inserted in Aug, 2008. Due for battery change in 1 month. ( [employer]) . Inserted by Dr Duffy    S/P cholecystectomy    Status post left hemicolectomy        ROS: Negative except for HPI    MEDICATIONS:  Home Medications:  apixaban 5 mg oral tablet: 1 tab(s) orally every 12 hours (15 Noah 2020 10:50)  ascorbic acid 500 mg oral tablet: 1 tab(s) orally once a day (12 Dec 2019 23:50)  Aspir 81 oral delayed release tablet: 1 tab(s) orally once a day (12 Dec 2019 23:50)  atorvastatin 20 mg oral tablet: 1 tab(s) orally once a day (at bedtime) (12 Dec 2019 23:50)  cetirizine 10 mg oral tablet: 1 tab(s) orally once a day (12 Dec 2019 23:50)  doxycycline monohydrate 100 mg oral capsule: 1 cap(s) orally every 12 hours till 1/17 (15 Noah 2020 10:50)  enalapril 10 mg oral tablet: 1 tab(s) orally 2 times a day (12 Dec 2019 23:50)  ferrous sulfate 325 mg (65 mg elemental iron) oral tablet: 1 tab(s) orally 2 times a day (12 Dec 2019 23:50)  folic acid 1 mg oral tablet: 1 tab(s) orally once a day (12 Dec 2019 23:50)  furosemide 40 mg oral tablet: 1  orally 2 times a day (12 Dec 2019 23:50)  Keflex 500 mg oral capsule: 1 cap(s) orally every 8 hours till 1/17 (15 Noah 2020 10:50)  magnesium oxide 500 mg oral tablet: 1 tab(s) orally 3 times a day max daily dose 3 tablets  (12 Dec 2019 23:50)  metroNIDAZOLE 500 mg oral tablet: 1 tab(s) orally every 12 hours till 1/17 (15 Noah 2020 10:50)  omeprazole 40 mg oral delayed release capsule: 1 cap(s) orally once a day (12 Dec 2019 23:50)  potassium chloride 20 mEq/15 mL oral liquid: 15 milliliter(s) orally once a day only as needed  (12 Dec 2019 23:50)  repaglinide 0.5 mg oral tablet: 1 tab(s) orally 3 times a day (before meals) (12 Dec 2019 23:50)  spironolactone 25 mg oral tablet: 1 tab(s) orally once a day (12 Dec 2019 23:50)  Symbicort 160 mcg-4.5 mcg/inh inhalation aerosol: 2 puff(s) inhaled once a day (at bedtime) (12 Dec 2019 23:50)  Ventolin HFA 90 mcg/inh inhalation aerosol: 1 puff(s) inhaled every 4 hours-6 hours as needed  (12 Dec 2019 23:50)      Allergies    Coreg (Other)  digoxin (Other; Short breath (Mild to Mod))  penicillins (Hives)    Intolerances    metoprolol (Other)  Solu-Medrol (Other (Mild to Mod))      SOCIAL HISTORY: Denies tobacco, social ETOH, denies illicit drug use    FAMILY HISTORY:  Family history of brain tumor (Father)        Vital Signs Last 24 Hrs  T(C): 36.6 (19 Jun 2021 19:38), Max: 36.9 (19 Jun 2021 15:27)  T(F): 97.8 (19 Jun 2021 19:38), Max: 98.5 (19 Jun 2021 15:27)  HR: 86 (19 Jun 2021 19:38) (71 - 87)  BP: 120/66 (19 Jun 2021 19:38) (96/49 - 120/66)  BP(mean): 64 (19 Jun 2021 15:27) (64 - 64)  RR: 20 (19 Jun 2021 19:38) (16 - 20)  SpO2: 95% (19 Jun 2021 19:38) (95% - 98%)    PHYSICAL EXAM:    Constitutional: NAD  HEENT: PERRLA, EOMI  Respiratory: Unlabored   Cardiovascular: RRR  Gastrointestinal: soft, NT/ND. 2 enterocutaneous fistulas noted in the lower abdomen with minimal pinkish/brown drainage. Skin graft noted, well healed. Ostomy w/ gas in bag, No rebound or guarding.  Extremities: No peripheral edema  Vascular: 2+ peripheral pulses  Neurological: A/O x 3, no focal deficits      LABS:                        10.4   7.73  )-----------( 148      ( 19 Jun 2021 16:56 )             31.9     06-19    138  |  101  |  21  ----------------------------<  82  4.6   |  22  |  0.95    Ca    8.8      19 Jun 2021 16:56    TPro  6.5  /  Alb  3.3  /  TBili  0.2  /  DBili  x   /  AST  29  /  ALT  22  /  AlkPhos  101  06-19        RADIOLOGY & ADDITIONAL STUDIES:    EXAM:  CT ABDOMEN AND PELVIS OC IC                            PROCEDURE DATE:  06/19/2021        INTERPRETATION:  CLINICAL INFORMATION: Abdominal pain with known enterocutaneous fistula.    COMPARISON: CT abdomen pelvis 3/20/2021.    CONTRAST/COMPLICATIONS:  IV Contrast: Omnipaque 350.  90 cc administered.   10 cc discarded.  Oral Contrast: Omnipaque 300.  Complications: None.    PROCEDURE:  CT of the Abdomen and Pelvis was performed.  Sagittal and coronal reformats were performed.    FINDINGS:  LOWER CHEST: Within normal limits.    LIVER: A portion of the left hepatic lobe is contained within a ventral abdominal hernia, otherwise within normal limits.  BILE DUCTS: Normal caliber.  GALLBLADDER: Cholecystectomy.  SPLEEN: Within normal limits.  PANCREAS: Within normal limits.  ADRENALS: Within normal limits.  KIDNEYS/URETERS: Multiple left-sided intrarenal calculi, the largest in the inferior pole measuring up to 5 mm. No hydronephrosis bilaterally.    BLADDER: Within normal limits.  REPRODUCTIVE ORGANS: Fibroid uterus. No adnexal masses.    BOWEL: A portion of the stomach as well as loops of small and large bowel contained within ventral abdominal hernia. No bowel obstruction. Appendix is normal.  PERITONEUM: No ascites.  VESSELS: Within normal limits.  RETROPERITONEUM/LYMPH NODES: No lymphadenopathy.  ABDOMINAL WALL: Rectus diastasis with large ventral abdominal hernia containing loops of small and large bowel as well as a portion of the left hepatic lobe and a portion of the stomach. Redemonstration of chronic stigmata from prior fistulous tracts in the anterior abdominal wall. Fistulous tract noted in the inferior ventral abdominal wall on prior scan does not appear patent. No new enterocutaneous fistula is identified.  BONES: Degenerative changes.    IMPRESSION:    *  Redemonstration of rectus diastasis with large ventral abdominal hernia, as above. Re-demonstration of chronic stigmata from prior fistulous tracts in the abdominal wall. No new enterocutaneous fistula is identified.  *  Multiple left-sided intrarenal calculi. No hydronephrosis.

## 2021-06-19 NOTE — ED PROVIDER NOTE - NS ED ROS FT
REVIEW OF SYSTEMS:    CONSTITUTIONAL: No weakness, fevers or chills  EYES/ENT: No visual changes;  No vertigo or throat pain   NECK: No pain or stiffness  RESPIRATORY: +cough and wheezing   CARDIOVASCULAR: No chest pain or palpitations  GASTROINTESTINAL: +abdominal pain . No nausea, vomiting, or hematemesis; No diarrhea or constipation. No melena or hematochezia.  GENITOURINARY: No dysuria, frequency or hematuria  NEUROLOGICAL: No numbness or weakness  SKIN: No itching, burning, rashes, or lesions   All other review of systems is negative unless indicated above.

## 2021-06-19 NOTE — ED PROVIDER NOTE - CARE PLAN
Principal Discharge DX:	Leg pain, diffuse, left   Principal Discharge DX:	Leg pain, diffuse, left  Secondary Diagnosis:	Enterocutaneous fistula

## 2021-06-19 NOTE — ED ADULT NURSE NOTE - PMH
Asthma    Atrial flutter    Cardiac Pacemaker    Cardiomyopathy    Congestive Heart Failure    Diabetes    Diverticulitis    Gout    HTN - Hypertension    Kidney stone    Patient is Sabianist    Refusal of blood transfusions as patient is Sabianist    Vertigo

## 2021-06-19 NOTE — ED PROVIDER NOTE - OBJECTIVE STATEMENT
75 woman with pmhx of DM2, HTN, COPD, Aflutter on Eliquis, HF with AICD ([employer], interrogated on 09/02/2019), and perforated diverticulitis requiring Lupe in July 2019 which complicated by enterocutaneous fistula and multiple abscesses requiring IR drainage, is now presenting with b/l LE pain, generalized abdominal pain, and cough. Patient developed LE pain today while she was resting at home. The pain is burning in quality, 10/10 in severity, constant, and reproducible on palpation. It is associated with swelling but no redness. It worsens with movement. No hx of recent trauma. She has hx of DVT and has been on eliquis ever since. She reports being compliant with her AC.   She also endorses diffuse abdominal pain. It also burning in sensation and worsens with cough or movement. It is associated with defecation. She has been passing gas and having optimal amount of stool from colostomy bag.   She also endorses SOB with wheezing for the last couple of days. She was diagnosed with bronchitis and was treated with azithromycin/prednisone x1.

## 2021-06-19 NOTE — CONSULT NOTE ADULT - ASSESSMENT
75 woman with pmhx of DM2, HTN, COPD, Aflutter on Eliquis, HF with AICD ([employer], interrogated on 09/02/2019), and perforated diverticulitis requiring Lupe in July 2019 which complicated by enterocutaneous fistula and multiple abscesses requiring IR drainage (most recently in Dec 2019 on the left anterior abdominal wall) and admissions in the past for high EC fistula output, now presenting with 1 day of LE and generalized body pains, including flank spasm. CT scan without evidence of acute intra-abdominal pathology.    - Patient with no GI symptoms or evidence of acute intra-abdominal pathology on imaging. No acute surgical intervention indicated   - LE duplex negative for DVT   - PO challenge   - Please follow up with Dr. Alvarado as scheduled (per daughter scheduled to see within the next week)  - Discussed with Dr. Donnie Dawkins PGY2   [place] [facility]   [id number]

## 2021-06-19 NOTE — ED ADULT NURSE NOTE - INTERVENTIONS DEFINITIONS
[employer] to call system/Physically safe environment: no spills, clutter or unnecessary equipment/Stretcher in lowest position, wheels locked, appropriate side rails in place/Monitor gait and stability/Monitor for mental status changes and reorient to person, place, and time

## 2021-06-19 NOTE — ED ADULT NURSE NOTE - PAIN RATING/NUMBER SCALE (0-10): ACTIVITY
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 Will trial clears today as pneumatosis has resolved on AXR and patient remains clinically well. Repeat AXR in AM. Now Patient to remain on PO Vanc for full course for C diff infection as well as Zosyn for treatment of enterocolitis. Will continue to perform daily AXR and abdominal girth to assess patient's response to clears. Patient also found to have a likely portal vein clot- started Lovenox 3/13. Will also give Neupogen daily for severe illness to facilitate count return as well as draw CBC, CMP Mg Phos daily w/ hx of FTT and weight loss will continue TPN. Surgery c/s for enterocolitis in anticipation of the unlikely event of perforation- surgery recommends no repogle at this time. Otherwise clinically stable at this moment. Continue to hold chemotherapy.    #Enterocolitis, C diff + stool, norovirus+ stool  - Zosyn 600mg q6hr (3/11 - )  - Vanc PO 75mg q6hr (3/13 - )  - NPO  - daily AXR, abdominal girth  - CBC, CMP Mg Phos daily  - repeat Bcx if febrile  - appreciate gen surg c/s  - to send GI PCR    #Portal Vein Thrombus  - Lovenox 1mg/kg BID  - Xa level 3 hrs after 3rd dose  - mom requires teaching before d/c    #Febrile neutropenia  - s/p Cefepime 360mg q8hr (3/8 - 3/10)  - BCx negative at 72hrs  - Neupogen daily  -  <- 370  - neutropenic precautions    #Anemia  - s/p 15cc/kg pRBCs    #Influenza  - s/p Tamiflu 30mg BID  - Tylenol / Motrin for fever PRN    #ALL  - IgM low, other Ig wnl  - HOLD Mercaptopurine 30mg daily  - HOLD Methotrexate 7.5mg qweek on Fridays  - To give today - Pentam 300mg once every 2 weeks, last given 3/1, next dose 3/15  - Fluconazole 56mg qday  - Acyclovir 80mg TID    #Chemotherapy-induced nausea  - Hydroxyzine 4mg q6hr  - Ondansetron 1.28mg q8hr    #FENGI  - TPN  - HOLD: Home feeds: Elecare 6oz via NG @ 7a, 12p, 7p, & 12a  - Chlorhexidine swab 15mL TID  - Ranitidine 15mg BID 6

## 2021-06-19 NOTE — ED ADULT NURSE NOTE - PSH
AICD (Automatic Cardioverter/Defibrillator) Present  inserted in Aug, 2008. Due for battery change in 1 month. ( [employer]) . Inserted by Dr Duffy  S/P cholecystectomy    Status post left hemicolectomy

## 2021-06-19 NOTE — ED PROVIDER NOTE - PSH
AICD (Automatic Cardioverter/Defibrillator) Present  inserted in Aug, 2008. Due for battery change in 1 month. ( [employer]) . Inserted by Dr Duffy  S/P cholecystectomy    Status post left hemicolectomy

## 2021-06-19 NOTE — ED ADULT NURSE NOTE - OBJECTIVE STATEMENT
75 y/o female pmh CHF, HTN, aflutter, COPD, asthma, diverticulitis, AICD, left hemicolectomy, colostomy bag in place, DVT on eliquis arrives to ED by EMS with c/o LLQ abdomen pain, BL LE pain. Patient currently being treated for bronchitis on azithromycin/prednisone x 1 day. Patient arrives a/ox4, able to follow commands, abdomen obese, vertical surgical scar in place, dressing to lower abdomen wound in place with yellowish discharge. Respirations unlabored, skin is warm/dry. BL pedal pulses present. Patient denies any fever/chills, urinary symptoms, IV 20g placed to left AC, labs drawn. Patient seen and evaluated by ED MD Cain.

## 2021-06-19 NOTE — ED PROVIDER NOTE - PROGRESS NOTE DETAILS
Consulted surgery for further evaluation. Consult was deferred by the resident as imaging and labs were not available at the time of consult. Will reconsult once labs are available. Odalis ARAGON PGY-1: called surgery again, will come to eval patient

## 2021-06-19 NOTE — ED PROVIDER NOTE - PMH
Asthma    Atrial flutter    Cardiac Pacemaker    Cardiomyopathy    Congestive Heart Failure    Diabetes    Diverticulitis    Gout    HTN - Hypertension    Kidney stone    Patient is Sabianism    Refusal of blood transfusions as patient is Sabianism    Vertigo

## 2021-06-20 DIAGNOSIS — Z79.899 OTHER LONG TERM (CURRENT) DRUG THERAPY: ICD-10-CM

## 2021-06-20 DIAGNOSIS — M79.604 PAIN IN RIGHT LEG: ICD-10-CM

## 2021-06-20 DIAGNOSIS — J45.909 UNSPECIFIED ASTHMA, UNCOMPLICATED: ICD-10-CM

## 2021-06-20 DIAGNOSIS — I42.8 OTHER CARDIOMYOPATHIES: ICD-10-CM

## 2021-06-20 DIAGNOSIS — E11.9 TYPE 2 DIABETES MELLITUS WITHOUT COMPLICATIONS: ICD-10-CM

## 2021-06-20 DIAGNOSIS — K63.2 FISTULA OF INTESTINE: ICD-10-CM

## 2021-06-20 DIAGNOSIS — K57.92 DIVERTICULITIS OF INTESTINE, PART UNSPECIFIED, WITHOUT PERFORATION OR ABSCESS WITHOUT BLEEDING: ICD-10-CM

## 2021-06-20 DIAGNOSIS — R05 COUGH: ICD-10-CM

## 2021-06-20 DIAGNOSIS — M79.605 PAIN IN LEFT LEG: ICD-10-CM

## 2021-06-20 DIAGNOSIS — Z29.9 ENCOUNTER FOR PROPHYLACTIC MEASURES, UNSPECIFIED: ICD-10-CM

## 2021-06-20 DIAGNOSIS — I48.92 UNSPECIFIED ATRIAL FLUTTER: ICD-10-CM

## 2021-06-20 LAB
ALBUMIN SERPL ELPH-MCNC: 3.2 G/DL — LOW (ref 3.3–5)
ALP SERPL-CCNC: 82 U/L — SIGNIFICANT CHANGE UP (ref 40–120)
ALT FLD-CCNC: 17 U/L — SIGNIFICANT CHANGE UP (ref 10–45)
ANION GAP SERPL CALC-SCNC: 13 MMOL/L — SIGNIFICANT CHANGE UP (ref 5–17)
AST SERPL-CCNC: 19 U/L — SIGNIFICANT CHANGE UP (ref 10–40)
BILIRUB SERPL-MCNC: 0.4 MG/DL — SIGNIFICANT CHANGE UP (ref 0.2–1.2)
BUN SERPL-MCNC: 17 MG/DL — SIGNIFICANT CHANGE UP (ref 7–23)
CALCIUM SERPL-MCNC: 9 MG/DL — SIGNIFICANT CHANGE UP (ref 8.4–10.5)
CHLORIDE SERPL-SCNC: 102 MMOL/L — SIGNIFICANT CHANGE UP (ref 96–108)
CK SERPL-CCNC: 301 U/L — HIGH (ref 25–170)
CO2 SERPL-SCNC: 23 MMOL/L — SIGNIFICANT CHANGE UP (ref 22–31)
CREAT SERPL-MCNC: 0.96 MG/DL — SIGNIFICANT CHANGE UP (ref 0.5–1.3)
GLUCOSE BLDC GLUCOMTR-MCNC: 105 MG/DL — HIGH (ref 70–99)
GLUCOSE BLDC GLUCOMTR-MCNC: 164 MG/DL — HIGH (ref 70–99)
GLUCOSE BLDC GLUCOMTR-MCNC: 200 MG/DL — HIGH (ref 70–99)
GLUCOSE BLDC GLUCOMTR-MCNC: 219 MG/DL — HIGH (ref 70–99)
GLUCOSE BLDC GLUCOMTR-MCNC: 78 MG/DL — SIGNIFICANT CHANGE UP (ref 70–99)
GLUCOSE SERPL-MCNC: 110 MG/DL — HIGH (ref 70–99)
HCT VFR BLD CALC: 31.5 % — LOW (ref 34.5–45)
HGB BLD-MCNC: 10.2 G/DL — LOW (ref 11.5–15.5)
LACTATE BLDV-MCNC: 1 MMOL/L — SIGNIFICANT CHANGE UP (ref 0.7–2)
MAGNESIUM SERPL-MCNC: 1.6 MG/DL — SIGNIFICANT CHANGE UP (ref 1.6–2.6)
MCHC RBC-ENTMCNC: 31.6 PG — SIGNIFICANT CHANGE UP (ref 27–34)
MCHC RBC-ENTMCNC: 32.4 GM/DL — SIGNIFICANT CHANGE UP (ref 32–36)
MCV RBC AUTO: 97.5 FL — SIGNIFICANT CHANGE UP (ref 80–100)
NRBC # BLD: 0 /100 WBCS — SIGNIFICANT CHANGE UP (ref 0–0)
PHOSPHATE SERPL-MCNC: 3.2 MG/DL — SIGNIFICANT CHANGE UP (ref 2.5–4.5)
PLATELET # BLD AUTO: 162 K/UL — SIGNIFICANT CHANGE UP (ref 150–400)
POTASSIUM SERPL-MCNC: 3.4 MMOL/L — LOW (ref 3.5–5.3)
POTASSIUM SERPL-SCNC: 3.4 MMOL/L — LOW (ref 3.5–5.3)
PROT SERPL-MCNC: 5.8 G/DL — LOW (ref 6–8.3)
RBC # BLD: 3.23 M/UL — LOW (ref 3.8–5.2)
RBC # FLD: 14 % — SIGNIFICANT CHANGE UP (ref 10.3–14.5)
SARS-COV-2 RNA SPEC QL NAA+PROBE: SIGNIFICANT CHANGE UP
SODIUM SERPL-SCNC: 138 MMOL/L — SIGNIFICANT CHANGE UP (ref 135–145)
WBC # BLD: 6.55 K/UL — SIGNIFICANT CHANGE UP (ref 3.8–10.5)
WBC # FLD AUTO: 6.55 K/UL — SIGNIFICANT CHANGE UP (ref 3.8–10.5)

## 2021-06-20 PROCEDURE — 99223 1ST HOSP IP/OBS HIGH 75: CPT

## 2021-06-20 PROCEDURE — 71250 CT THORAX DX C-: CPT | Mod: 26

## 2021-06-20 RX ORDER — OMEPRAZOLE 10 MG/1
0 CAPSULE, DELAYED RELEASE ORAL
Qty: 0 | Refills: 0 | DISCHARGE

## 2021-06-20 RX ORDER — CETIRIZINE HYDROCHLORIDE 10 MG/1
1 TABLET ORAL
Qty: 0 | Refills: 0 | DISCHARGE

## 2021-06-20 RX ORDER — DEXTROSE 50 % IN WATER 50 %
12.5 SYRINGE (ML) INTRAVENOUS ONCE
Refills: 0 | Status: DISCONTINUED | OUTPATIENT
Start: 2021-06-20 | End: 2021-06-22

## 2021-06-20 RX ORDER — ALBUTEROL 90 UG/1
2.5 AEROSOL, METERED ORAL ONCE
Refills: 0 | Status: COMPLETED | OUTPATIENT
Start: 2021-06-20 | End: 2021-06-20

## 2021-06-20 RX ORDER — DEXTROSE 50 % IN WATER 50 %
25 SYRINGE (ML) INTRAVENOUS ONCE
Refills: 0 | Status: DISCONTINUED | OUTPATIENT
Start: 2021-06-20 | End: 2021-06-22

## 2021-06-20 RX ORDER — REPAGLINIDE 1 MG/1
0 TABLET ORAL
Qty: 0 | Refills: 0 | DISCHARGE

## 2021-06-20 RX ORDER — ATORVASTATIN CALCIUM 80 MG/1
1 TABLET, FILM COATED ORAL
Qty: 0 | Refills: 0 | DISCHARGE

## 2021-06-20 RX ORDER — POTASSIUM CHLORIDE 20 MEQ
40 PACKET (EA) ORAL ONCE
Refills: 0 | Status: COMPLETED | OUTPATIENT
Start: 2021-06-20 | End: 2021-06-20

## 2021-06-20 RX ORDER — SPIRONOLACTONE 25 MG/1
0 TABLET, FILM COATED ORAL
Qty: 0 | Refills: 0 | DISCHARGE

## 2021-06-20 RX ORDER — FUROSEMIDE 40 MG
40 TABLET ORAL DAILY
Refills: 0 | Status: DISCONTINUED | OUTPATIENT
Start: 2021-06-20 | End: 2021-06-22

## 2021-06-20 RX ORDER — ALBUTEROL 90 UG/1
1 AEROSOL, METERED ORAL EVERY 4 HOURS
Refills: 0 | Status: DISCONTINUED | OUTPATIENT
Start: 2021-06-20 | End: 2021-06-22

## 2021-06-20 RX ORDER — REPAGLINIDE 1 MG/1
0.5 TABLET ORAL
Refills: 0 | Status: DISCONTINUED | OUTPATIENT
Start: 2021-06-20 | End: 2021-06-22

## 2021-06-20 RX ORDER — GLUCAGON INJECTION, SOLUTION 0.5 MG/.1ML
1 INJECTION, SOLUTION SUBCUTANEOUS ONCE
Refills: 0 | Status: DISCONTINUED | OUTPATIENT
Start: 2021-06-20 | End: 2021-06-22

## 2021-06-20 RX ORDER — MAGNESIUM OXIDE 400 MG ORAL TABLET 241.3 MG
1 TABLET ORAL
Qty: 0 | Refills: 0 | DISCHARGE

## 2021-06-20 RX ORDER — ACETAMINOPHEN 500 MG
650 TABLET ORAL ONCE
Refills: 0 | Status: COMPLETED | OUTPATIENT
Start: 2021-06-20 | End: 2021-06-20

## 2021-06-20 RX ORDER — POTASSIUM CHLORIDE 20 MEQ
5 PACKET (EA) ORAL
Qty: 0 | Refills: 0 | DISCHARGE

## 2021-06-20 RX ORDER — APIXABAN 2.5 MG/1
5 TABLET, FILM COATED ORAL EVERY 12 HOURS
Refills: 0 | Status: DISCONTINUED | OUTPATIENT
Start: 2021-06-20 | End: 2021-06-22

## 2021-06-20 RX ORDER — ATORVASTATIN CALCIUM 80 MG/1
20 TABLET, FILM COATED ORAL AT BEDTIME
Refills: 0 | Status: DISCONTINUED | OUTPATIENT
Start: 2021-06-20 | End: 2021-06-22

## 2021-06-20 RX ORDER — MONTELUKAST 4 MG/1
0 TABLET, CHEWABLE ORAL
Qty: 0 | Refills: 0 | DISCHARGE

## 2021-06-20 RX ORDER — FUROSEMIDE 40 MG
0 TABLET ORAL
Qty: 0 | Refills: 0 | DISCHARGE

## 2021-06-20 RX ORDER — FOLIC ACID 0.8 MG
1 TABLET ORAL
Qty: 0 | Refills: 0 | DISCHARGE

## 2021-06-20 RX ORDER — SODIUM CHLORIDE 9 MG/ML
1000 INJECTION, SOLUTION INTRAVENOUS
Refills: 0 | Status: DISCONTINUED | OUTPATIENT
Start: 2021-06-20 | End: 2021-06-22

## 2021-06-20 RX ORDER — FERROUS SULFATE 325(65) MG
1 TABLET ORAL
Qty: 0 | Refills: 0 | DISCHARGE

## 2021-06-20 RX ORDER — ASPIRIN/CALCIUM CARB/MAGNESIUM 324 MG
1 TABLET ORAL
Qty: 0 | Refills: 0 | DISCHARGE

## 2021-06-20 RX ORDER — DEXTROSE 50 % IN WATER 50 %
15 SYRINGE (ML) INTRAVENOUS ONCE
Refills: 0 | Status: DISCONTINUED | OUTPATIENT
Start: 2021-06-20 | End: 2021-06-22

## 2021-06-20 RX ORDER — MONTELUKAST 4 MG/1
10 TABLET, CHEWABLE ORAL DAILY
Refills: 0 | Status: DISCONTINUED | OUTPATIENT
Start: 2021-06-20 | End: 2021-06-22

## 2021-06-20 RX ORDER — ASPIRIN/CALCIUM CARB/MAGNESIUM 324 MG
81 TABLET ORAL DAILY
Refills: 0 | Status: DISCONTINUED | OUTPATIENT
Start: 2021-06-20 | End: 2021-06-22

## 2021-06-20 RX ORDER — PANTOPRAZOLE SODIUM 20 MG/1
40 TABLET, DELAYED RELEASE ORAL
Refills: 0 | Status: DISCONTINUED | OUTPATIENT
Start: 2021-06-20 | End: 2021-06-22

## 2021-06-20 RX ORDER — SPIRONOLACTONE 25 MG/1
25 TABLET, FILM COATED ORAL DAILY
Refills: 0 | Status: DISCONTINUED | OUTPATIENT
Start: 2021-06-20 | End: 2021-06-22

## 2021-06-20 RX ADMIN — Medication 650 MILLIGRAM(S): at 05:45

## 2021-06-20 RX ADMIN — MONTELUKAST 10 MILLIGRAM(S): 4 TABLET, CHEWABLE ORAL at 12:51

## 2021-06-20 RX ADMIN — Medication 100 MILLIGRAM(S): at 07:28

## 2021-06-20 RX ADMIN — ALBUTEROL 1 PUFF(S): 90 AEROSOL, METERED ORAL at 07:29

## 2021-06-20 RX ADMIN — SPIRONOLACTONE 25 MILLIGRAM(S): 25 TABLET, FILM COATED ORAL at 07:29

## 2021-06-20 RX ADMIN — Medication 650 MILLIGRAM(S): at 04:57

## 2021-06-20 RX ADMIN — REPAGLINIDE 0.5 MILLIGRAM(S): 1 TABLET ORAL at 07:29

## 2021-06-20 RX ADMIN — ALBUTEROL 1 PUFF(S): 90 AEROSOL, METERED ORAL at 12:51

## 2021-06-20 RX ADMIN — Medication 650 MILLIGRAM(S): at 22:42

## 2021-06-20 RX ADMIN — PANTOPRAZOLE SODIUM 40 MILLIGRAM(S): 20 TABLET, DELAYED RELEASE ORAL at 07:28

## 2021-06-20 RX ADMIN — ALBUTEROL 2.5 MILLIGRAM(S): 90 AEROSOL, METERED ORAL at 22:42

## 2021-06-20 RX ADMIN — ALBUTEROL 1 PUFF(S): 90 AEROSOL, METERED ORAL at 21:58

## 2021-06-20 RX ADMIN — ATORVASTATIN CALCIUM 20 MILLIGRAM(S): 80 TABLET, FILM COATED ORAL at 21:58

## 2021-06-20 RX ADMIN — REPAGLINIDE 0.5 MILLIGRAM(S): 1 TABLET ORAL at 12:51

## 2021-06-20 RX ADMIN — ALBUTEROL 2.5 MILLIGRAM(S): 90 AEROSOL, METERED ORAL at 00:58

## 2021-06-20 RX ADMIN — Medication 40 MILLIEQUIVALENT(S): at 17:06

## 2021-06-20 RX ADMIN — Medication 40 MILLIGRAM(S): at 07:28

## 2021-06-20 RX ADMIN — APIXABAN 5 MILLIGRAM(S): 2.5 TABLET, FILM COATED ORAL at 17:06

## 2021-06-20 RX ADMIN — ALBUTEROL 1 PUFF(S): 90 AEROSOL, METERED ORAL at 17:07

## 2021-06-20 RX ADMIN — APIXABAN 5 MILLIGRAM(S): 2.5 TABLET, FILM COATED ORAL at 07:28

## 2021-06-20 RX ADMIN — Medication 650 MILLIGRAM(S): at 23:42

## 2021-06-20 RX ADMIN — REPAGLINIDE 0.5 MILLIGRAM(S): 1 TABLET ORAL at 17:06

## 2021-06-20 NOTE — H&P ADULT - NSHPLABSRESULTS_GEN_ALL_CORE
LABS:  CBC Full  -  ( 19 Jun 2021 16:56 )  WBC Count : 7.73 K/uL  Hemoglobin : 10.4 g/dL  Hematocrit : 31.9 %  Platelet Count - Automated : 148 K/uL  Mean Cell Volume : 97.6 fl  Mean Cell Hemoglobin : 31.8 pg  Mean Cell Hemoglobin Concentration : 32.6 gm/dL  Auto Neutrophil # : 5.81 K/uL  Auto Lymphocyte # : 1.04 K/uL  Auto Monocyte # : 0.69 K/uL  Auto Eosinophil # : 0.15 K/uL  Auto Basophil # : 0.02 K/uL  Auto Neutrophil % : 75.1 %  Auto Lymphocyte % : 13.5 %  Auto Monocyte % : 8.9 %  Auto Eosinophil % : 1.9 %  Auto Basophil % : 0.3 %    138    |  101    |  21     ----------------------------<  82       19 Jun 2021 16:56  4.6     |  22     |  0.95         Ca 8.8           19 Jun 2021 16:56        TPro  6.5    /  Alb  3.3    /  TBili  0.2    /  DBili  x      /  AST  29     /  ALT  22     /  AlkPhos  101    19 Jun 2021 16:56        x< from: Xray Chest 1 View- PORTABLE-Urgent (Xray Chest 1 View- PORTABLE-Urgent .) (03.08.21 @ 18:08) >      IMPRESSION:    Lungs are clear.      < end of copied text >    < from: CT Abdomen and Pelvis w/ Oral Cont and w/ IV Cont (06.19.21 @ 18:49) >      IMPRESSION:    *  Redemonstration of rectus diastasis with large ventral abdominal hernia, as above. Redemonstration of chronic stigmata from prior fistulous tracts in the abdominal wall. No new enterocutaneous fistula is identified.  *  Multiple left-sided intrarenal calculi. No hydronephrosis.    < end of copied text >

## 2021-06-20 NOTE — H&P ADULT - NSHPPHYSICALEXAM_GEN_ALL_CORE
Constitutional: NAD, sitting up, well appearing  HEENT: PERRLA, EOMI  Respiratory: clear, no wheezes  Cardiovascular: RRR  Gastrointestinal: soft, NT/ND. 2 enterocutaneous fistulas noted in the lower abdomen with minimal pinkish/brown drainage. Skin graft noted, well healed. Ostomy w/ gas in bag, No rebound or guarding.  Extremities: No peripheral edema, no TTP, erythema, warmth, or skin changes  Vascular: 2+ peripheral pulses

## 2021-06-20 NOTE — PHYSICAL THERAPY INITIAL EVALUATION ADULT - PERTINENT HX OF CURRENT PROBLEM, REHAB EVAL
75 F hx of DM2, HTN, COPD, Aflutter on Eliquis, HF with AICD, and perforated diverticulitis in July 2019 which complicated by enterocutaneous fistula and multiple abscesses requiring IR drainage (most recently in Dec 2019 on the left anterior abdominal wall) and admissions in the past for high EC fistula output, now presenting with 1 day of LE and generalized body pains, including flank spasm. CT Abd: Multiple left-sided intrarenal calculi. No hydronephrosis.

## 2021-06-20 NOTE — H&P ADULT - PROBLEM SELECTOR PLAN 1
lower ext doppler negative for DVT  electrolytes WNL  neuropathy possible- though pt has history of well controlled DM. Currently pain is resolved but if recurs can trial gabapentin

## 2021-06-20 NOTE — H&P ADULT - PROBLEM SELECTOR PLAN 9
diet- trial of regular DASH/TLC  dvt ppx- c/w eliquis  dispo- likely home in AM given no acute pathology, pt stable and no surgical intervention Pt is refusing to allow us to call daughter to clarify medication list, insists multiple times that we have everything in our records. Last hospitalization in 2019 and allscripts not inclusive. Med history garnered from kurt alvarado in AM.

## 2021-06-20 NOTE — H&P ADULT - PROBLEM SELECTOR PLAN 6
s/p complicated surgical history and post op course with infections and fistulization- see above plan

## 2021-06-20 NOTE — PHYSICAL THERAPY INITIAL EVALUATION ADULT - GAIT DEVIATIONS NOTED, PT EVAL
HISTORY OF PRESENT ILLNESS  Silverio Gee is a 41 y.o. female. HPI   Went to ED for CP and dizziness on 1/4/2020  bp was 160/130 in ED , given enalapril IV then down to 130/60  At home BP has been around 130/100  Cardiac enzymes and ekg wnl  Still having headaches and bp at home has been up most of the time  bp drops too low after taking enalapril  Took 1/2 tab of enalapril today leydi is 115/72 today in office  Takes enalapril only when bp is elevated around with DBP around 100mmhg  Headaches occur on top of head and above the eyes  Headaches go away when her BP is wnl. No vision tobias  Has occipital headaches which are different per pt    bp higher in mornings and midday             Last OV  Has PUD and possible UC  Dx treated by GI MD-Dr Kena Trent  Hx C diff colitis  Has frequent emesis--insurance allows 15 zofran per month, requests another medication for nausea  Had 4-5 gastric ulcers witrh bleeding this year and hospitalized at [facility] --4 were clipped per pt  No nsaids not but still some nausea  Possible dx of UC too per pt  Has f/u with GI MD  Sees counselor for anxiety and takes remeron and xanax  Increased stress with  and family issues  Sees Dr Soledad Pollock for SLE on Benlysta and rituxan          Patient Active Problem List    Diagnosis Date Noted    Severe obesity ([facility] [address] 02/22/2019    Incomplete uterovaginal prolapse 05/17/2016    Migraine with aura and without status migrainosus, not intractable 12/02/2015    Anxiety 12/05/2014    Lupus ([facility] [address] 12/04/2014    Recurrent ventral incisional hernia 03/01/2013    Ventral hernia 02/25/2013    Abnormal CT of the abdomen 11/08/2012     Current Outpatient Medications   Medication Sig Dispense Refill    ondansetron hcl (ZOFRAN) 4 mg tablet Take 1 Tab by mouth every eight (8) hours as needed for Nausea. 20 Tab 0    enalapril (VASOTEC) 5 mg tablet Take 1 Tab by mouth daily as needed (hypertension).  30 Tab 0    albuterol (PROVENTIL HFA, VENTOLIN HFA, PROAIR HFA) 90 mcg/actuation inhaler Take 1 Puff by inhalation every six (6) hours as needed for Wheezing. 1 Inhaler 0    mirtazapine (REMERON) 30 mg tablet take 1 tablet by mouth at bedtime 30 Tab 3    L.acidoph & parac-S.therm-Bifido (BELGICA Q2/RISAQUAD-2) 16 billion cell cap cap Take 1 Cap by mouth daily. 30 Cap 1    furosemide (LASIX) 20 mg tablet PRN  0    tiZANidine (ZANAFLEX) 4 mg tablet   0    mirtazapine (REMERON) 30 mg tablet Take 30 mg by mouth daily.  estradiol (ESTRACE) 1 mg tablet Take 1 mg by mouth daily. 0    ALPRAZolam (XANAX) 1 mg tablet take 1 tablet by mouth NIGHTLY maximum daily dose of 1 tablet 30 Tab 0    DULoxetine (CYMBALTA) 60 mg capsule Take 120 mg by mouth daily. 1    pantoprazole (PROTONIX) 40 mg tablet take 1 tablet by mouth at bedtime  0    codeine-butalbital-acetaminophen-caffeine (FIORICET WITH CODEINE) -82-30 mg capsule take 2 capsules by mouth every 6 hours if needed for MIGRAINE HEADACHE 90 Cap 0    rizatriptan (MAXALT) 10 mg tablet Take 10 mg by mouth once as needed for Migraine. May repeat in 2 hours if needed      belimumab (BENLYSTA) 400 mg solr injection by IntraVENous route.  promethazine (PHENERGAN) 25 mg tablet TAKE 1 TABLET BY MOUTH EVERY 6 HOURS IF NEEDED FOR NAUSEA 30 Tab 0    predniSONE (STERAPRED DS) 10 mg dose pack Follow instructions on 10 day dose pack 1 Package 0    predniSONE (DELTASONE) 20 mg tablet 60mg po daily for 2days, 40mg po daily for 2 days, 20mg po daily for 2days then stop 12 Tab 0    naloxone (NARCAN) 4 mg/actuation nasal spray Use 1 spray intranasally, then discard. Repeat with new spray every 2 min as needed for opioid overdose symptoms, alternating nostrils. 1 Each 0    doxycycline (MONODOX) 100 mg capsule Take 100 mg by mouth daily.   0    meclizine (ANTIVERT) 12.5 mg tablet take 1 tablet by mouth three times a day if needed for VERTIGO FOR UP TO 10 DAYS 30 Tab 1    indomethacin (INDOCIN) 25 mg capsule PRN Allergies   Allergen Reactions    Latex Itching     burning    Compazine [Prochlorperazine] Anxiety     High heart rate  Pt can take promethazine with no problems    Sulfa (Sulfonamide Antibiotics) Unknown (comments)    Topamax [Topiramate] Unknown (comments)    Adhesive Rash     Allergic to Dermabond    Clindamycin Diarrhea     Pt states caused her C-Diff    Mushroom Combination No.1 Unknown (comments)    Toradol [Ketorolac Tromethamine] Nausea and Vomiting and Other (comments)     PO & IV causes GI bleed    Valproic Acid Other (comments)     Elevated heart rate and vomiting        Lab Results   Component Value Date/Time    WBC 9.9 09/06/2018 02:03 AM    HGB 11.2 (L) 09/06/2018 02:03 AM    Hemoglobin (POC) 12.6 05/17/2016 07:47 AM    HCT 35.0 09/06/2018 02:03 AM    Hematocrit (POC) 37 05/17/2016 07:47 AM    PLATELET 431 (H) 59/87/9634 02:03 AM    MCV 88.4 09/06/2018 02:03 AM     Lab Results   Component Value Date/Time    GFR est non-AA >60 09/06/2018 02:03 AM    GFRNA, POC >60 05/17/2016 07:47 AM    GFR est AA >60 09/06/2018 02:03 AM    GFRAA, POC >60 05/17/2016 07:47 AM    Creatinine 0.78 09/06/2018 02:03 AM    Creatinine (POC) 0.7 05/17/2016 07:47 AM    BUN 13 09/06/2018 02:03 AM    BUN (POC) 8 (L) 05/17/2016 07:47 AM    Sodium 139 09/06/2018 02:03 AM    Sodium (POC) 140 05/17/2016 07:47 AM    Potassium 3.7 09/06/2018 02:03 AM    Potassium (POC) 3.8 05/17/2016 07:47 AM    Chloride 108 09/06/2018 02:03 AM    Chloride (POC) 105 05/17/2016 07:47 AM    CO2 24 09/06/2018 02:03 AM    Magnesium 2.1 06/17/2018 11:57 AM        ROS    Physical Exam  Vitals signs and nursing note reviewed. Constitutional:       Appearance: She is well-developed. She is obese. Comments: Appears stated age   Eyes:      Comments: No papilledema   Cardiovascular:      Rate and Rhythm: Normal rate and regular rhythm. Heart sounds: Normal heart sounds. No murmur. No friction rub. No gallop.     Pulmonary:      Effort: Pulmonary effort is normal. No respiratory distress. Breath sounds: Normal breath sounds. No wheezing. Abdominal:      General: Bowel sounds are normal.      Palpations: Abdomen is soft. Neurological:      Mental Status: She is alert. ASSESSMENT and PLAN  Diagnoses and all orders for this visit:    1. Chronic tension-type headache, not intractable  -     REFERRAL TO NEUROLOGY  -     CT HEAD WO CONT; Future-new onset and worse when BP is elevated per pt    2. Labile blood pressure  -     24HR BLD PRESSURE MONITOR; Future   Refill enalapril mg--take 1/2 tab every day prn bp elevation only   Nurse bp check here in 1 week   Low sodium diet   Reviewed  ER w/u and labs  3. New onset of headaches  -     CT HEAD WO CONT; Future    Other orders  -     ondansetron hcl (ZOFRAN) 4 mg tablet; Take 1 Tab by mouth every eight (8) hours as needed for Nausea. -     enalapril (VASOTEC) 5 mg tablet; Take 1 Tab by mouth daily as needed (hypertension). Follow-up and Dispositions    · Return in about 1 month (around 2/21/2020) for f/u labile BP and headaches. decreased sai/decreased step length/decreased stride length

## 2021-06-20 NOTE — PROGRESS NOTE ADULT - SUBJECTIVE AND OBJECTIVE BOX
The patient is clinically stable - the area in the RUQ wither the subcutaneous dye is is not fluctuant - to have IR try to pass a feeding tube into  the gastric remnant Monday The patient is clinically stable - to be discharged The patient is clinically stable - can be discharged from surgical view.

## 2021-06-20 NOTE — PHYSICAL THERAPY INITIAL EVALUATION ADULT - ADDITIONAL COMMENTS
75 F hx of DM2, HTN, COPD, Aflutter on Eliquis, HF with AICD, and perforated diverticulitis in July 2019 which complicated by enterocutaneous fistula and multiple abscesses requiring IR drainage (most recently in Dec 2019 on the left anterior abdominal wall) and admissions in the past for high EC fistula output, now presenting with 1 day of LE and generalized body pains, including flank spasm. CT scan without evidence of acute intra-abdominal pathology. As per the pt, she lives in a pvt house w/ daughter, -BROOKE thru side entrance, PTA, pt was I in Adls/functional ambulation. Pt has Rollator and w/c. used rollator only for outdoor mobility.

## 2021-06-20 NOTE — H&P ADULT - NSICDXPASTSURGICALHX_GEN_ALL_CORE_FT
PAST SURGICAL HISTORY:  AICD (Automatic Cardioverter/Defibrillator) Present inserted in Aug, 2008. Due for battery change in 1 month. ( APROOFED) . Inserted by Dr Duffy    S/P cholecystectomy     Status post left hemicolectomy

## 2021-06-20 NOTE — PROGRESS NOTE ADULT - SUBJECTIVE AND OBJECTIVE BOX
patient not known to me, assigned this AM to assume care  chart reviewed and events thus far noted  admitted overnight by faculty hospitalist    plan as per HnP  PE notable for scattered bilateral wheeze  pulmonary evaluation  bilateral venous duplex to r/o DVT is negative  will order echocardiogram  add CPK to AM labs

## 2021-06-20 NOTE — H&P ADULT - PROBLEM SELECTOR PLAN 10
diet- trial of regular DASH/TLC  dvt ppx- c/w eliquis  dispo- likely home in AM given no acute pathology, pt stable and no surgical intervention

## 2021-06-20 NOTE — H&P ADULT - HISTORY OF PRESENT ILLNESS
77 y/o female w/ hx NICM, ppm, aflutter on eliquis, asthma c/b respiratory failre requiring trach and peg in past, GI bleed, perforated diverticulitis S/P ex-lap and Lupe's procedure  in July 2019 with abdomen left open, multiple washouts, then abdominal closure with Vicryl mesh (fascia left open) and skin graft over wound, now with partial colectomy and end colosstomy, presenting with 3 days of bilateral leg cramps and burning pain. She states that her legs feel "as if all the potassium and magnesium have been depleted from my body". She tried OTC icy hot without relief, and thinks it may have gotten worse. She went to see a doctor on 6/19 (unable to give name) and was told to go to ED for evaluation. She is also complaining of back pains, abdominal "twitches", and "pain everywhere". Currently she says all the pain is better, but has some back aching from the ED stretcher. The leg cramps are now resolved. She denies leg swelling. No chest pain, but does endorse worsening productive cough over past few days. No fevers or chills. No sick contacts. She stays at home in bed for most of the day.     In ED given LR bolus and albuterol nebs x1.   Vital Signs Last 24 Hrs  T(C): 36.8 (20 Jun 2021 02:27), Max: 36.9 (19 Jun 2021 15:27)  T(F): 98.2 (20 Jun 2021 02:27), Max: 98.5 (19 Jun 2021 15:27)  HR: 83 (20 Jun 2021 02:27) (71 - 87)  BP: 119/70 (20 Jun 2021 02:27) (96/49 - 126/66)  BP(mean): 64 (19 Jun 2021 15:27) (64 - 64)  RR: 20 (20 Jun 2021 02:27) (16 - 20)  SpO2: 97% (20 Jun 2021 02:27) (95% - 100%) 75 y/o female w/ hx NICM, ppm, aflutter on eliquis, asthma c/b respiratory failure requiring trach and peg in past, GI bleed, perforated diverticulitis S/P ex-lap and Lupe's procedure  in July 2019 with abdomen left open, multiple washouts, then abdominal closure with Vicryl mesh (fascia left open) and skin graft over wound, now with partial colectomy and end colosstomy, presenting with 3 days of bilateral leg cramps and burning pain. She states that her legs feel "as if all the potassium and magnesium have been depleted from my body". She tried OTC icy hot without relief, and thinks it may have gotten worse. She went to see a doctor on 6/19 (unable to give name) and was told to go to ED for evaluation. She is also complaining of back pains, abdominal "twitches", and "pain everywhere". Currently she says all the pain is better, but has some back aching from the ED stretcher. The leg cramps are now resolved. She denies leg swelling. No chest pain, but does endorse worsening productive cough over past few days. No fevers or chills. No sick contacts. She stays at home in bed for most of the day.     In ED given LR bolus and albuterol nebs x1.   Vital Signs Last 24 Hrs  T(C): 36.8 (20 Jun 2021 02:27), Max: 36.9 (19 Jun 2021 15:27)  T(F): 98.2 (20 Jun 2021 02:27), Max: 98.5 (19 Jun 2021 15:27)  HR: 83 (20 Jun 2021 02:27) (71 - 87)  BP: 119/70 (20 Jun 2021 02:27) (96/49 - 126/66)  BP(mean): 64 (19 Jun 2021 15:27) (64 - 64)  RR: 20 (20 Jun 2021 02:27) (16 - 20)  SpO2: 97% (20 Jun 2021 02:27) (95% - 100%)

## 2021-06-20 NOTE — H&P ADULT - ASSESSMENT
77 y/o female w/ hx NICM, ppm, aflutter on eliquis, asthma c/b respiratory failre requiring trach and peg in past, GI bleed, perforated diverticulitis S/P ex-lap and Lupe's procedure  in July 2019 with abdomen left open, multiple washouts, then abdominal closure with Vicryl mesh (fascia left open) and skin graft over wound, now with partial colectomy and end colosstomy, presenting with 3 days of bilateral leg cramps and burning pain, lower ext doppler negative for clot, electrolytes WNL, possible neuropathy vs. manifestation of viral illness given worsened cough and generalized myalgias.

## 2021-06-20 NOTE — H&P ADULT - NSHPREVIEWOFSYSTEMS_GEN_ALL_CORE
REVIEW OF SYSTEMS:    CONSTITUTIONAL: No weakness, fevers or chills  NECK: No pain or stiffness  RESPIRATORY: +cough, +wheezing, hemoptysis; No shortness of breath  CARDIOVASCULAR: No chest pain or palpitations  GASTROINTESTINAL: +abdominal pain. No nausea, vomiting, or hematemesis; No diarrhea or constipation. No melena or hematochezia.  GENITOURINARY: No dysuria, frequency or hematuria  NEUROLOGICAL: No numbness or weakness  SKIN: No itching, burning, rashes, or lesions   All other review of systems is negative unless indicated above.

## 2021-06-20 NOTE — PHYSICAL THERAPY INITIAL EVALUATION ADULT - PRECAUTIONS/LIMITATIONS, REHAB EVAL
CT Abd cont.. Redemonstration of rectus diastasis with large ventral abdominal hernia/fall precautions

## 2021-06-20 NOTE — H&P ADULT - NSICDXFAMILYHX_GEN_ALL_CORE_FT
FAMILY HISTORY:  Father  Still living? Unknown  Family history of brain tumor, Age at diagnosis: Age Unknown

## 2021-06-20 NOTE — H&P ADULT - PROBLEM SELECTOR PLAN 3
Appreciate surgical eval  CT scan without evidence of acute intra-abdominal pathology and patient denying GI symptoms or pain at this time, "occasional twinges when I change position"  Trial of diet  f/u with Dr. Alvarado as outpatient

## 2021-06-21 LAB
A1C WITH ESTIMATED AVERAGE GLUCOSE RESULT: 7.8 % — HIGH (ref 4–5.6)
ANION GAP SERPL CALC-SCNC: 14 MMOL/L — SIGNIFICANT CHANGE UP (ref 5–17)
BUN SERPL-MCNC: 17 MG/DL — SIGNIFICANT CHANGE UP (ref 7–23)
CALCIUM SERPL-MCNC: 9.3 MG/DL — SIGNIFICANT CHANGE UP (ref 8.4–10.5)
CHLORIDE SERPL-SCNC: 104 MMOL/L — SIGNIFICANT CHANGE UP (ref 96–108)
CK SERPL-CCNC: 229 U/L — HIGH (ref 25–170)
CO2 SERPL-SCNC: 22 MMOL/L — SIGNIFICANT CHANGE UP (ref 22–31)
COVID-19 SPIKE DOMAIN AB INTERP: POSITIVE
COVID-19 SPIKE DOMAIN ANTIBODY RESULT: >250 U/ML — HIGH
CREAT SERPL-MCNC: 0.92 MG/DL — SIGNIFICANT CHANGE UP (ref 0.5–1.3)
ESTIMATED AVERAGE GLUCOSE: 177 MG/DL — HIGH (ref 68–114)
GLUCOSE BLDC GLUCOMTR-MCNC: 129 MG/DL — HIGH (ref 70–99)
GLUCOSE BLDC GLUCOMTR-MCNC: 142 MG/DL — HIGH (ref 70–99)
GLUCOSE BLDC GLUCOMTR-MCNC: 207 MG/DL — HIGH (ref 70–99)
GLUCOSE BLDC GLUCOMTR-MCNC: 222 MG/DL — HIGH (ref 70–99)
GLUCOSE SERPL-MCNC: 102 MG/DL — HIGH (ref 70–99)
POTASSIUM SERPL-MCNC: 3.9 MMOL/L — SIGNIFICANT CHANGE UP (ref 3.5–5.3)
POTASSIUM SERPL-SCNC: 3.9 MMOL/L — SIGNIFICANT CHANGE UP (ref 3.5–5.3)
SARS-COV-2 IGG+IGM SERPL QL IA: >250 U/ML — HIGH
SARS-COV-2 IGG+IGM SERPL QL IA: POSITIVE
SODIUM SERPL-SCNC: 140 MMOL/L — SIGNIFICANT CHANGE UP (ref 135–145)

## 2021-06-21 PROCEDURE — 93306 TTE W/DOPPLER COMPLETE: CPT | Mod: 26

## 2021-06-21 RX ORDER — BUDESONIDE AND FORMOTEROL FUMARATE DIHYDRATE 160; 4.5 UG/1; UG/1
2 AEROSOL RESPIRATORY (INHALATION)
Refills: 0 | Status: DISCONTINUED | OUTPATIENT
Start: 2021-06-21 | End: 2021-06-22

## 2021-06-21 RX ORDER — ACETAMINOPHEN 500 MG
650 TABLET ORAL ONCE
Refills: 0 | Status: COMPLETED | OUTPATIENT
Start: 2021-06-21 | End: 2021-06-21

## 2021-06-21 RX ADMIN — ALBUTEROL 1 PUFF(S): 90 AEROSOL, METERED ORAL at 13:43

## 2021-06-21 RX ADMIN — REPAGLINIDE 0.5 MILLIGRAM(S): 1 TABLET ORAL at 12:15

## 2021-06-21 RX ADMIN — Medication 650 MILLIGRAM(S): at 20:13

## 2021-06-21 RX ADMIN — ALBUTEROL 1 PUFF(S): 90 AEROSOL, METERED ORAL at 21:46

## 2021-06-21 RX ADMIN — SPIRONOLACTONE 25 MILLIGRAM(S): 25 TABLET, FILM COATED ORAL at 06:00

## 2021-06-21 RX ADMIN — Medication 40 MILLIGRAM(S): at 06:00

## 2021-06-21 RX ADMIN — BUDESONIDE AND FORMOTEROL FUMARATE DIHYDRATE 2 PUFF(S): 160; 4.5 AEROSOL RESPIRATORY (INHALATION) at 17:14

## 2021-06-21 RX ADMIN — Medication 100 MILLIGRAM(S): at 20:12

## 2021-06-21 RX ADMIN — ALBUTEROL 1 PUFF(S): 90 AEROSOL, METERED ORAL at 09:34

## 2021-06-21 RX ADMIN — APIXABAN 5 MILLIGRAM(S): 2.5 TABLET, FILM COATED ORAL at 06:00

## 2021-06-21 RX ADMIN — REPAGLINIDE 0.5 MILLIGRAM(S): 1 TABLET ORAL at 17:13

## 2021-06-21 RX ADMIN — Medication 650 MILLIGRAM(S): at 21:30

## 2021-06-21 RX ADMIN — ALBUTEROL 1 PUFF(S): 90 AEROSOL, METERED ORAL at 06:00

## 2021-06-21 RX ADMIN — ALBUTEROL 1 PUFF(S): 90 AEROSOL, METERED ORAL at 17:13

## 2021-06-21 RX ADMIN — PANTOPRAZOLE SODIUM 40 MILLIGRAM(S): 20 TABLET, DELAYED RELEASE ORAL at 06:00

## 2021-06-21 RX ADMIN — MONTELUKAST 10 MILLIGRAM(S): 4 TABLET, CHEWABLE ORAL at 12:15

## 2021-06-21 RX ADMIN — ATORVASTATIN CALCIUM 20 MILLIGRAM(S): 80 TABLET, FILM COATED ORAL at 21:47

## 2021-06-21 RX ADMIN — APIXABAN 5 MILLIGRAM(S): 2.5 TABLET, FILM COATED ORAL at 17:12

## 2021-06-21 NOTE — CONSULT NOTE ADULT - ASSESSMENT
77 y/o female w/ hx NICM, ppm, aflutter on eliquis, asthma c/b respiratory failre requiring trach and peg in past, GI bleed, perforated diverticulitis S/P ex-lap and Lupe's procedure  in July 2019 with abdomen left open, multiple washouts, then abdominal closure with Vicryl mesh (fascia left open) and skin graft over wound, now with partial colectomy and end colosstomy, presenting with 3 days of bilateral leg cramps and burning pain, lower ext doppler negative for clot, electrolytes WNL, possible neuropathy vs. manifestation of viral illness given worsened cough and generalized myalgias.    Leg cramps:  NICM:  Asthma:  hx of perforated diverticulitis    6/21:  Leg cramps:: seems to be better  electrolytes OK: no dvt on dopplers: not much of SOB either: ct abdomens noted: ct chest done : report pending: to my prominent right sided minor fissure: no gross consolidation:   Asthma: minor wheezing: chest ct clear to me except prominent fissure on rt die: add Symbicort as well as cont singulair no need for oral prednisone at tis time:   NICM: cont curretn rx  hx of perforated diverticulitis: stable:  DVT inez ALVARADO ACP

## 2021-06-21 NOTE — CONSULT NOTE ADULT - SUBJECTIVE AND OBJECTIVE BOX
06-21-21 @ 09:56    Patient is a 76y old  Female who presents with a chief complaint of leg cramps (21 Jun 2021 06:56)      HPI:  77 y/o female w/ hx NICM, ppm, aflutter on eliquis, asthma c/b respiratory failure requiring trach and peg in past, GI bleed, perforated diverticulitis S/P ex-lap and Lupe's procedure  in July 2019 with abdomen left open, multiple washouts, then abdominal closure with Vicryl mesh (fascia left open) and skin graft over wound, now with partial colectomy and end colosstomy, presenting with 3 days of bilateral leg cramps and burning pain. She states that her legs feel "as if all the potassium and magnesium have been depleted from my body". She tried OTC icy hot without relief, and thinks it may have gotten worse. She went to see a doctor on 6/19 (unable to give name) and was told to go to ED for evaluation. She is also complaining of back pains, abdominal "twitches", and "pain everywhere". Currently she says all the pain is better, but has some back aching from the ED stretcher. The leg cramps are now resolved. She denies leg swelling. No chest pain, but does endorse worsening productive cough over past few days. No fevers or chills. No sick contacts. She stays at home in bed for most of the day.     In ED given LR bolus and albuterol nebs x1.   Vital Signs Last 24 Hrs  T(C): 36.8 (20 Jun 2021 02:27), Max: 36.9 (19 Jun 2021 15:27)  T(F): 98.2 (20 Jun 2021 02:27), Max: 98.5 (19 Jun 2021 15:27)  HR: 83 (20 Jun 2021 02:27) (71 - 87)  BP: 119/70 (20 Jun 2021 02:27) (96/49 - 126/66)  BP(mean): 64 (19 Jun 2021 15:27) (64 - 64)  RR: 20 (20 Jun 2021 02:27) (16 - 20)  SpO2: 97% (20 Jun 2021 02:27) (95% - 100%) (20 Jun 2021 04:19)    she has asthma and had been intubated in the past: she is on inhalers every day : Symbicort  and currently admitted to hospital for leg cramps  She says her breathing has been fine        ?FOLLOWING PRESENT  [ x] Hx of PE/DVT, [x ] Hx COPD, [ y] Hx of Asthma, [ ] Hx of Hospitalization, x[ ]  Hx of BiPAP/CPAP use, [ x Hx of MARK  x  Allergies    Coreg (Other)  digoxin (Other; Short breath (Mild to Mod))  penicillins (Hives)    Intolerances    metoprolol (Other)  Solu-Medrol (Other (Mild to Mod))      PAST MEDICAL & SURGICAL HISTORY:  Asthma    Congestive Heart Failure    Diabetes    Gout    HTN - Hypertension    Cardiac Pacemaker    Kidney stone    Cardiomyopathy    Diverticulitis    Atrial flutter    Vertigo    Patient is Jehovah&#x27;s Witness    Refusal of blood transfusions as patient is Jehovah&#x27;s Witness    AICD (Automatic Cardioverter/Defibrillator) Present  inserted in Aug, 2008. Due for battery change in 1 month. ( [employer]) . Inserted by Dr Duffy    S/P cholecystectomy    Status post left hemicolectomy        FAMILY HISTORY:  Family history of brain tumor (Father)      x  Social History: [  ] TOBACCO                  [  x] ETOH                                 [  x] IVDA/DRUGS    REVIEW OF SYSTEMS      General:x	    Skin/Breast:x  	  Ophthalmologic:x  	  ENMT:	x    Respiratory and Thorax: mild wheezing  	  Cardiovascular:	x    Gastrointestinal:	x    Genitourinary:	x    Musculoskeletal:	 leg cramps    Neurological:	  xx  Psychiatric:	x    Hematology/Lymphatics:	x    Endocrine:	x    Allergic/Immunologic:	x    MEDICATIONS  (STANDING):  ALBUTerol    90 MICROgram(s) HFA Inhaler 1 Puff(s) Inhalation every 4 hours  apixaban 5 milliGRAM(s) Oral every 12 hours  aspirin enteric coated 81 milliGRAM(s) Oral daily  atorvastatin 20 milliGRAM(s) Oral at bedtime  benzonatate 100 milliGRAM(s) Oral every 8 hours  dextrose 40% Gel 15 Gram(s) Oral once  dextrose 5%. 1000 milliLiter(s) (50 mL/Hr) IV Continuous <Continuous>  dextrose 5%. 1000 milliLiter(s) (100 mL/Hr) IV Continuous <Continuous>  dextrose 50% Injectable 25 Gram(s) IV Push once  dextrose 50% Injectable 12.5 Gram(s) IV Push once  dextrose 50% Injectable 25 Gram(s) IV Push once  furosemide    Tablet 40 milliGRAM(s) Oral daily  glucagon  Injectable 1 milliGRAM(s) IntraMuscular once  montelukast 10 milliGRAM(s) Oral daily  pantoprazole    Tablet 40 milliGRAM(s) Oral before breakfast  repaglinide 0.5 milliGRAM(s) Oral three times a day before meals  spironolactone 25 milliGRAM(s) Oral daily    MEDICATIONS  (PRN):       Vital Signs Last 24 Hrs  T(C): 36.6 (21 Jun 2021 00:35), Max: 36.7 (20 Jun 2021 16:19)  T(F): 97.8 (21 Jun 2021 00:35), Max: 98 (20 Jun 2021 16:19)  HR: 88 (21 Jun 2021 00:35) (88 - 114)  BP: 112/68 (21 Jun 2021 00:35) (105/65 - 119/74)  BP(mean): --  RR: 18 (21 Jun 2021 00:35) (18 - 18)  SpO2: 99% (21 Jun 2021 00:35) (97% - 99%)Orthostatic VS          I&O's Summary      Physical Exam:   GENERAL: NAD, well-groomed, well-developed  HEENT: COLLEEN/   Atraumatic, Normocephalic  ENMT: No tonsillar erythema, exudates, or enlargement; Moist mucous membranes, Good dentition, No lesions  NECK: Supple, No JVD, Normal thyroid  CHEST/LUNG: mild exp wheezing r> l   CVS: Regular rate and rhythm; No murmurs, rubs, or gallops  GI: : Soft, Nontender, Nondistended; Bowel sounds present  NERVOUS SYSTEM:  Alert & Oriented X3  EXTREMITIES: mild edema  LYMPH: No lymphadenopathy noted  SKIN: No rashes or lesions  ENDOCRINOLOGY: No Thyromegaly  PSYCH: Appropriate    Labs:  Venous<46<4>>43<<7.395>>Venous<<3><<4><<5<<439>>COVID-19 PCR: NotDetec (20 Jun 2021 00:44)    CARDIAC MARKERS ( 21 Jun 2021 06:58 )  x     / x     / 229 U/L / x     / x      CARDIAC MARKERS ( 20 Jun 2021 07:22 )  x     / x     / 301 U/L / x     / x                                10.2   6.55  )-----------( 162      ( 20 Jun 2021 07:28 )             31.5                         10.4   7.73  )-----------( 148      ( 19 Jun 2021 16:56 )             31.9     06-21    140  |  104  |  17  ----------------------------<  102<H>  3.9   |  22  |  0.92  06-20    138  |  102  |  17  ----------------------------<  110<H>  3.4<L>   |  23  |  0.96  06-19    138  |  101  |  21  ----------------------------<  82  4.6   |  22  |  0.95    Ca    9.3      21 Jun 2021 06:58  Ca    9.0      20 Jun 2021 07:22  Ca    8.8      19 Jun 2021 16:56  Phos  3.2     06-20  Mg     1.6     06-20    TPro  5.8<L>  /  Alb  3.2<L>  /  TBili  0.4  /  DBili  x   /  AST  19  /  ALT  17  /  AlkPhos  82  06-20  TPro  6.5  /  Alb  3.3  /  TBili  0.2  /  DBili  x   /  AST  29  /  ALT  22  /  AlkPhos  101  06-19    CAPILLARY BLOOD GLUCOSE      POCT Blood Glucose.: 129 mg/dL (21 Jun 2021 08:14)  POCT Blood Glucose.: 164 mg/dL (20 Jun 2021 21:47)  POCT Blood Glucose.: 78 mg/dL (20 Jun 2021 20:11)  POCT Blood Glucose.: 200 mg/dL (20 Jun 2021 16:53)  POCT Blood Glucose.: 219 mg/dL (20 Jun 2021 12:29)    LIVER FUNCTIONS - ( 20 Jun 2021 07:22 )  Alb: 3.2 g/dL / Pro: 5.8 g/dL / ALK PHOS: 82 U/L / ALT: 17 U/L / AST: 19 U/L / GGT: x               D DImer  Serum Pro-Brain Natriuretic Peptide: 118 pg/mL (06-19 @ 16:56)      Studies  Chest X-RAY  CT SCAN Chest   CT Abdomen  Venous Dopplers: LE:   Others        rad< from: CT Abdomen and Pelvis w/ Oral Cont and w/ IV Cont (06.19.21 @ 18:49) >  Sagittal and coronal reformats were performed.    FINDINGS:  LOWER CHEST: Within normal limits.    LIVER: A portion of the left hepatic lobe is contained within a ventral abdominal hernia, otherwise within normal limits.  BILE DUCTS: Normal caliber.  GALLBLADDER: Cholecystectomy.  SPLEEN: Within normal limits.  PANCREAS: Within normal limits.  ADRENALS: Within normal limits.  KIDNEYS/URETERS: Multiple left-sided intrarenal calculi, the largest in the inferior pole measuring up to 5 mm. No hydronephrosis bilaterally.    BLADDER: Within normal limits.  REPRODUCTIVE ORGANS: Fibroid uterus. No adnexal masses.    BOWEL: A portion of the stomach as well as loops of small and large bowel contained within ventral abdominal hernia. No bowel obstruction. Appendix is normal.  PERITONEUM: No ascites.  VESSELS: Within normal limits.  RETROPERITONEUM/LYMPH NODES: No lymphadenopathy.  ABDOMINAL WALL: Rectus diastasis with large ventral abdominal hernia containing loops of small and large bowel as well as a portion of the left hepatic lobe and a portion of the stomach. Redemonstration of chronic stigmata from prior fistulous tracts in the anterior abdominal wall. Fistulous tract noted in the inferior ventral abdominal wall on prior scan does not appear patent. No new enterocutaneous fistula is identified.  BONES: Degenerative changes.    IMPRESSION:    *  Redemonstration of rectus diastasis with large ventral abdominal hernia, as above. Redemonstration of chronic stigmata from prior fistulous tracts in the abdominal wall. No new enterocutaneous fistula is identified.  *  Multiple left-sided intrarenal calculi. No hydronephrosis.          < from: [facility] Duplex Lower Ext Vein Scan, Bilat (06.19.21 @ 16:44) >    PROCEDURE DATE:  06/19/2021            INTERPRETATION:  CLINICAL INFORMATION: Bilateral leg swelling.    COMPARISON: Venous Doppler dated 05/06/2016    TECHNIQUE: Duplex sonography ofthe BILATERAL LOWER extremity veins with color and spectral Doppler, with and without compression.    FINDINGS:    RIGHT:  Normal compressibility of the RIGHT common femoral, femoral and popliteal veins.  Doppler examination shows normal spontaneous and phasic flow.  No RIGHT calf vein thrombosis is detected.    LEFT:  Normal compressibility of the LEFT common femoral, femoral and popliteal veins.  Doppler examination shows normal spontaneous and phasic flow.  No LEFT calf vein thrombosis is detected.    IMPRESSION:  No evidence of deep venous thrombosis in either lower extremity.          < from: Xray Chest 1 View- PORTABLE-Urgent (Xray Chest 1 View- PORTABLE-Urgent .) (03.08.21 @ 18:08) >  TECHNIQUE: Frontal radiograph of the chest.    COMPARISON: Chest xlvabooleb12/29/2020.    FINDINGS:    LUNGS: The lungs are clear.    PLEURA: No pleural effusion or pneumothorax.    HEART AND MEDIASTINUM: Heart size is within normal limits.    SKELETON: Unremarkable skeletal structures.    MISCELLANEOUS: Defibrillator and left chest wall with multiple connected intracardiac leads.      IMPRESSION:    Lungs are clear.              MARY ALICE SMITH MD; Resident Radiology  This document has been electronically signed.  MAMTA GONZALEZ MD; Attending Radiologist  This documenthas been electronically signed. Mar  8 2021  8:35PM    < end of copied text >            KAMILA DICKEY M.D., ATTENDING RADIOLOGIST  This document has been electronically signed. Jun 19 2021  5:13PM    < end of copied text >        SALIMA NAGY MD; Resident Radiology  This document has been electronically signed.  ALETHEA STEWART MD; Attending Radiologist  This document has been electronically signed. Jun 19 2021  7:34PM    < end of copied text >

## 2021-06-21 NOTE — PROGRESS NOTE ADULT - SUBJECTIVE AND OBJECTIVE BOX
GENERAL SURGERY PROGRESS NOTE    SUBJECTIVE  Patient seen and examined    c/o pain LLQ hernia with coughing, no pain now    OBJECTIVE    PHYSICAL EXAM  General: Appears well, NAD  CHEST: breathing comfortably  CV: appears well perfused  Abdomen: soft, nontender, nondistended  ostomy pink  ECG mucosa pink  Extremities: Grossly symmetric    T(C): 36.6 (06-21-21 @ 00:35), Max: 36.8 (06-20-21 @ 08:48)  HR: 88 (06-21-21 @ 00:35) (88 - 114)  BP: 112/68 (06-21-21 @ 00:35) (105/65 - 119/76)  RR: 18 (06-21-21 @ 00:35) (18 - 18)  SpO2: 99% (06-21-21 @ 00:35) (96% - 99%)      MEDICATIONS  ALBUTerol    90 MICROgram(s) HFA Inhaler 1 Puff(s) Inhalation every 4 hours  apixaban 5 milliGRAM(s) Oral every 12 hours  aspirin enteric coated 81 milliGRAM(s) Oral daily  atorvastatin 20 milliGRAM(s) Oral at bedtime  benzonatate 100 milliGRAM(s) Oral every 8 hours  dextrose 40% Gel 15 Gram(s) Oral once  dextrose 5%. 1000 milliLiter(s) IV Continuous <Continuous>  dextrose 5%. 1000 milliLiter(s) IV Continuous <Continuous>  dextrose 50% Injectable 25 Gram(s) IV Push once  dextrose 50% Injectable 12.5 Gram(s) IV Push once  dextrose 50% Injectable 25 Gram(s) IV Push once  furosemide    Tablet 40 milliGRAM(s) Oral daily  glucagon  Injectable 1 milliGRAM(s) IntraMuscular once  montelukast 10 milliGRAM(s) Oral daily  pantoprazole    Tablet 40 milliGRAM(s) Oral before breakfast  repaglinide 0.5 milliGRAM(s) Oral three times a day before meals  spironolactone 25 milliGRAM(s) Oral daily      LABS                        10.2   6.55  )-----------( 162      ( 20 Jun 2021 07:28 )             31.5     06-20    138  |  102  |  17  ----------------------------<  110<H>  3.4<L>   |  23  |  0.96    Ca    9.0      20 Jun 2021 07:22  Phos  3.2     06-20  Mg     1.6     06-20    TPro  5.8<L>  /  Alb  3.2<L>  /  TBili  0.4  /  DBili  x   /  AST  19  /  ALT  17  /  AlkPhos  82  06-20          RADIOLOGY & ADDITIONAL STUDIES

## 2021-06-21 NOTE — PROGRESS NOTE ADULT - SUBJECTIVE AND OBJECTIVE BOX
Patient is a 76y old  Female who presents with a chief complaint of leg cramps (21 Jun 2021 09:55)      DATE OF SERVICE: 06-21-21 @ 18:10    SUBJECTIVE / OVERNIGHT EVENTS: overnight events noted    ROS:  Resp: No cough no sputum production  CVS: No chest pain no palpitations no orthopnea  GI: no N/V/D  : no dysuria, no hematuria  Neuro: no weakness no paresthesias  Heme: No petechiae no easy bruising  Msk: No joint pain no swelling  Skin: No rash no itching        MEDICATIONS  (STANDING):  ALBUTerol    90 MICROgram(s) HFA Inhaler 1 Puff(s) Inhalation every 4 hours  apixaban 5 milliGRAM(s) Oral every 12 hours  aspirin enteric coated 81 milliGRAM(s) Oral daily  atorvastatin 20 milliGRAM(s) Oral at bedtime  benzonatate 100 milliGRAM(s) Oral every 8 hours  budesonide 160 MICROgram(s)/formoterol 4.5 MICROgram(s) Inhaler 2 Puff(s) Inhalation two times a day  dextrose 40% Gel 15 Gram(s) Oral once  dextrose 5%. 1000 milliLiter(s) (50 mL/Hr) IV Continuous <Continuous>  dextrose 5%. 1000 milliLiter(s) (100 mL/Hr) IV Continuous <Continuous>  dextrose 50% Injectable 25 Gram(s) IV Push once  dextrose 50% Injectable 12.5 Gram(s) IV Push once  dextrose 50% Injectable 25 Gram(s) IV Push once  furosemide    Tablet 40 milliGRAM(s) Oral daily  glucagon  Injectable 1 milliGRAM(s) IntraMuscular once  montelukast 10 milliGRAM(s) Oral daily  pantoprazole    Tablet 40 milliGRAM(s) Oral before breakfast  repaglinide 0.5 milliGRAM(s) Oral three times a day before meals  spironolactone 25 milliGRAM(s) Oral daily    MEDICATIONS  (PRN):        CAPILLARY BLOOD GLUCOSE      POCT Blood Glucose.: 142 mg/dL (21 Jun 2021 16:55)  POCT Blood Glucose.: 222 mg/dL (21 Jun 2021 12:08)  POCT Blood Glucose.: 129 mg/dL (21 Jun 2021 08:14)  POCT Blood Glucose.: 164 mg/dL (20 Jun 2021 21:47)  POCT Blood Glucose.: 78 mg/dL (20 Jun 2021 20:11)    I&O's Summary      Vital Signs Last 24 Hrs  T(C): 36.7 (21 Jun 2021 16:42), Max: 36.7 (21 Jun 2021 12:10)  T(F): 98 (21 Jun 2021 16:42), Max: 98.1 (21 Jun 2021 12:10)  HR: 96 (21 Jun 2021 16:42) (88 - 110)  BP: 110/67 (21 Jun 2021 16:42) (110/67 - 112/68)  BP(mean): --  RR: 18 (21 Jun 2021 16:42) (18 - 18)  SpO2: 96% (21 Jun 2021 16:42) (95% - 99%)    PHYSICAL EXAM:  GENERAL: in no apparent distress  HEAD:  Atraumatic, Normocephalic  EYES: EOMI, PERRLA, sclera clear  NECK: Supple, No JVD  CHEST/LUNG: clear b/l, no wheeze  HEART: S1 S2; no murmurs appreciated  ABDOMEN: Soft, Nontender, Bowel sounds present  EXTREMITIES:  no edema  NEUROLOGY: AO x 3 non-focal  SKIN: No rashes or lesions    LABS:                        10.2   6.55  )-----------( 162      ( 20 Jun 2021 07:28 )             31.5     06-21    140  |  104  |  17  ----------------------------<  102<H>  3.9   |  22  |  0.92    Ca    9.3      21 Jun 2021 06:58  Phos  3.2     06-20  Mg     1.6     06-20    TPro  5.8<L>  /  Alb  3.2<L>  /  TBili  0.4  /  DBili  x   /  AST  19  /  ALT  17  /  AlkPhos  82  06-20      CARDIAC MARKERS ( 21 Jun 2021 06:58 )  x     / x     / 229 U/L / x     / x      CARDIAC MARKERS ( 20 Jun 2021 07:22 )  x     / x     / 301 U/L / x     / x                All consultant(s) notes reviewed and care discussed with other providers        Contact Number, Dr Nicole [phone]

## 2021-06-22 ENCOUNTER — TRANSCRIPTION ENCOUNTER (OUTPATIENT)
Age: 76
End: 2021-06-22

## 2021-06-22 VITALS
TEMPERATURE: 97 F | RESPIRATION RATE: 18 BRPM | HEART RATE: 93 BPM | DIASTOLIC BLOOD PRESSURE: 78 MMHG | SYSTOLIC BLOOD PRESSURE: 122 MMHG | OXYGEN SATURATION: 94 %

## 2021-06-22 LAB
ANION GAP SERPL CALC-SCNC: 14 MMOL/L — SIGNIFICANT CHANGE UP (ref 5–17)
BUN SERPL-MCNC: 18 MG/DL — SIGNIFICANT CHANGE UP (ref 7–23)
CALCIUM SERPL-MCNC: 9.8 MG/DL — SIGNIFICANT CHANGE UP (ref 8.4–10.5)
CHLORIDE SERPL-SCNC: 99 MMOL/L — SIGNIFICANT CHANGE UP (ref 96–108)
CO2 SERPL-SCNC: 24 MMOL/L — SIGNIFICANT CHANGE UP (ref 22–31)
CREAT SERPL-MCNC: 0.92 MG/DL — SIGNIFICANT CHANGE UP (ref 0.5–1.3)
GLUCOSE BLDC GLUCOMTR-MCNC: 118 MG/DL — HIGH (ref 70–99)
GLUCOSE BLDC GLUCOMTR-MCNC: 199 MG/DL — HIGH (ref 70–99)
GLUCOSE SERPL-MCNC: 172 MG/DL — HIGH (ref 70–99)
HCT VFR BLD CALC: 36.2 % — SIGNIFICANT CHANGE UP (ref 34.5–45)
HGB BLD-MCNC: 11.7 G/DL — SIGNIFICANT CHANGE UP (ref 11.5–15.5)
MCHC RBC-ENTMCNC: 31.2 PG — SIGNIFICANT CHANGE UP (ref 27–34)
MCHC RBC-ENTMCNC: 32.3 GM/DL — SIGNIFICANT CHANGE UP (ref 32–36)
MCV RBC AUTO: 96.5 FL — SIGNIFICANT CHANGE UP (ref 80–100)
NRBC # BLD: 0 /100 WBCS — SIGNIFICANT CHANGE UP (ref 0–0)
PLATELET # BLD AUTO: 206 K/UL — SIGNIFICANT CHANGE UP (ref 150–400)
POTASSIUM SERPL-MCNC: 3.7 MMOL/L — SIGNIFICANT CHANGE UP (ref 3.5–5.3)
POTASSIUM SERPL-SCNC: 3.7 MMOL/L — SIGNIFICANT CHANGE UP (ref 3.5–5.3)
RBC # BLD: 3.75 M/UL — LOW (ref 3.8–5.2)
RBC # FLD: 13.8 % — SIGNIFICANT CHANGE UP (ref 10.3–14.5)
SODIUM SERPL-SCNC: 137 MMOL/L — SIGNIFICANT CHANGE UP (ref 135–145)
WBC # BLD: 6.47 K/UL — SIGNIFICANT CHANGE UP (ref 3.8–10.5)
WBC # FLD AUTO: 6.47 K/UL — SIGNIFICANT CHANGE UP (ref 3.8–10.5)

## 2021-06-22 PROCEDURE — 83605 ASSAY OF LACTIC ACID: CPT

## 2021-06-22 PROCEDURE — 74177 CT ABD & PELVIS W/CONTRAST: CPT

## 2021-06-22 PROCEDURE — 93970 EXTREMITY STUDY: CPT

## 2021-06-22 PROCEDURE — 85014 HEMATOCRIT: CPT

## 2021-06-22 PROCEDURE — 94640 AIRWAY INHALATION TREATMENT: CPT

## 2021-06-22 PROCEDURE — 83735 ASSAY OF MAGNESIUM: CPT

## 2021-06-22 PROCEDURE — 83036 HEMOGLOBIN GLYCOSYLATED A1C: CPT

## 2021-06-22 PROCEDURE — 87635 SARS-COV-2 COVID-19 AMP PRB: CPT

## 2021-06-22 PROCEDURE — 84100 ASSAY OF PHOSPHORUS: CPT

## 2021-06-22 PROCEDURE — G0378: CPT

## 2021-06-22 PROCEDURE — 97162 PT EVAL MOD COMPLEX 30 MIN: CPT

## 2021-06-22 PROCEDURE — 82435 ASSAY OF BLOOD CHLORIDE: CPT

## 2021-06-22 PROCEDURE — 99284 EMERGENCY DEPT VISIT MOD MDM: CPT

## 2021-06-22 PROCEDURE — 82962 GLUCOSE BLOOD TEST: CPT

## 2021-06-22 PROCEDURE — 85018 HEMOGLOBIN: CPT

## 2021-06-22 PROCEDURE — 84295 ASSAY OF SERUM SODIUM: CPT

## 2021-06-22 PROCEDURE — 82947 ASSAY GLUCOSE BLOOD QUANT: CPT

## 2021-06-22 PROCEDURE — 80053 COMPREHEN METABOLIC PANEL: CPT

## 2021-06-22 PROCEDURE — 86769 SARS-COV-2 COVID-19 ANTIBODY: CPT

## 2021-06-22 PROCEDURE — 82803 BLOOD GASES ANY COMBINATION: CPT

## 2021-06-22 PROCEDURE — 82330 ASSAY OF CALCIUM: CPT

## 2021-06-22 PROCEDURE — 84484 ASSAY OF TROPONIN QUANT: CPT

## 2021-06-22 PROCEDURE — 85027 COMPLETE CBC AUTOMATED: CPT

## 2021-06-22 PROCEDURE — 84132 ASSAY OF SERUM POTASSIUM: CPT

## 2021-06-22 PROCEDURE — 82550 ASSAY OF CK (CPK): CPT

## 2021-06-22 PROCEDURE — 83880 ASSAY OF NATRIURETIC PEPTIDE: CPT

## 2021-06-22 PROCEDURE — 80048 BASIC METABOLIC PNL TOTAL CA: CPT

## 2021-06-22 PROCEDURE — 85025 COMPLETE CBC W/AUTO DIFF WBC: CPT

## 2021-06-22 PROCEDURE — C8929: CPT

## 2021-06-22 PROCEDURE — 71250 CT THORAX DX C-: CPT

## 2021-06-22 RX ORDER — DEXTROMETHORPHAN HYDROBROMIDE AND PROMETHAZINE HYDROCHLORIDE 15; 6.25 MG/5ML; MG/5ML
0 SYRUP ORAL
Qty: 0 | Refills: 0 | DISCHARGE

## 2021-06-22 RX ORDER — FUROSEMIDE 40 MG
1 TABLET ORAL
Qty: 0 | Refills: 0 | DISCHARGE
Start: 2021-06-22

## 2021-06-22 RX ORDER — APIXABAN 2.5 MG/1
0 TABLET, FILM COATED ORAL
Qty: 0 | Refills: 0 | DISCHARGE

## 2021-06-22 RX ORDER — INSULIN LISPRO 100/ML
VIAL (ML) SUBCUTANEOUS AT BEDTIME
Refills: 0 | Status: DISCONTINUED | OUTPATIENT
Start: 2021-06-22 | End: 2021-06-22

## 2021-06-22 RX ORDER — INSULIN LISPRO 100/ML
VIAL (ML) SUBCUTANEOUS
Refills: 0 | Status: DISCONTINUED | OUTPATIENT
Start: 2021-06-22 | End: 2021-06-22

## 2021-06-22 RX ORDER — NYSTATIN CREAM 100000 [USP'U]/G
0 CREAM TOPICAL
Qty: 0 | Refills: 0 | DISCHARGE

## 2021-06-22 RX ADMIN — PANTOPRAZOLE SODIUM 40 MILLIGRAM(S): 20 TABLET, DELAYED RELEASE ORAL at 05:43

## 2021-06-22 RX ADMIN — ALBUTEROL 1 PUFF(S): 90 AEROSOL, METERED ORAL at 05:43

## 2021-06-22 RX ADMIN — MONTELUKAST 10 MILLIGRAM(S): 4 TABLET, CHEWABLE ORAL at 12:08

## 2021-06-22 RX ADMIN — BUDESONIDE AND FORMOTEROL FUMARATE DIHYDRATE 2 PUFF(S): 160; 4.5 AEROSOL RESPIRATORY (INHALATION) at 05:43

## 2021-06-22 RX ADMIN — APIXABAN 5 MILLIGRAM(S): 2.5 TABLET, FILM COATED ORAL at 05:44

## 2021-06-22 RX ADMIN — Medication 81 MILLIGRAM(S): at 12:08

## 2021-06-22 RX ADMIN — ALBUTEROL 1 PUFF(S): 90 AEROSOL, METERED ORAL at 10:30

## 2021-06-22 RX ADMIN — REPAGLINIDE 0.5 MILLIGRAM(S): 1 TABLET ORAL at 08:39

## 2021-06-22 RX ADMIN — SPIRONOLACTONE 25 MILLIGRAM(S): 25 TABLET, FILM COATED ORAL at 05:44

## 2021-06-22 RX ADMIN — ALBUTEROL 1 PUFF(S): 90 AEROSOL, METERED ORAL at 13:09

## 2021-06-22 RX ADMIN — Medication 1: at 12:24

## 2021-06-22 RX ADMIN — REPAGLINIDE 0.5 MILLIGRAM(S): 1 TABLET ORAL at 12:08

## 2021-06-22 RX ADMIN — Medication 40 MILLIGRAM(S): at 05:44

## 2021-06-22 NOTE — DISCHARGE NOTE NURSING/CASE MANAGEMENT/SOCIAL WORK - PATIENT PORTAL LINK FT
You can access the FollowMyHealth Patient Portal offered by [facility] by registering at the following website: [email or web address]. By joining [facility]’s FollowMyHealth portal, you will also be able to view your health information using other applications (apps) compatible with our system.

## 2021-06-22 NOTE — DISCHARGE NOTE PROVIDER - CARE PROVIDERS DIRECT ADDRESSES
[email or web address]

## 2021-06-22 NOTE — PROGRESS NOTE ADULT - SUBJECTIVE AND OBJECTIVE BOX
Patient is a 76y old  Female who presents with a chief complaint of leg cramps (22 Jun 2021 15:44)      DATE OF SERVICE: 06-22-21 @ 15:56    SUBJECTIVE / OVERNIGHT EVENTS: overnight events noted    ROS:  Resp: No cough no sputum production  CVS: No chest pain no palpitations no orthopnea  GI: no N/V/D  : no dysuria, no hematuria  Neuro: no weakness no paresthesias  Heme: No petechiae no easy bruising  Msk: No joint pain no swelling  Skin: No rash no itching        MEDICATIONS  (STANDING):  ALBUTerol    90 MICROgram(s) HFA Inhaler 1 Puff(s) Inhalation every 4 hours  apixaban 5 milliGRAM(s) Oral every 12 hours  aspirin enteric coated 81 milliGRAM(s) Oral daily  atorvastatin 20 milliGRAM(s) Oral at bedtime  benzonatate 100 milliGRAM(s) Oral every 8 hours  budesonide 160 MICROgram(s)/formoterol 4.5 MICROgram(s) Inhaler 2 Puff(s) Inhalation two times a day  dextrose 40% Gel 15 Gram(s) Oral once  dextrose 5%. 1000 milliLiter(s) (50 mL/Hr) IV Continuous <Continuous>  dextrose 5%. 1000 milliLiter(s) (100 mL/Hr) IV Continuous <Continuous>  dextrose 50% Injectable 25 Gram(s) IV Push once  dextrose 50% Injectable 12.5 Gram(s) IV Push once  dextrose 50% Injectable 25 Gram(s) IV Push once  furosemide    Tablet 40 milliGRAM(s) Oral daily  glucagon  Injectable 1 milliGRAM(s) IntraMuscular once  insulin lispro (ADMELOG) corrective regimen sliding scale   SubCutaneous three times a day before meals  insulin lispro (ADMELOG) corrective regimen sliding scale   SubCutaneous at bedtime  montelukast 10 milliGRAM(s) Oral daily  pantoprazole    Tablet 40 milliGRAM(s) Oral before breakfast  repaglinide 0.5 milliGRAM(s) Oral three times a day before meals  spironolactone 25 milliGRAM(s) Oral daily    MEDICATIONS  (PRN):        CAPILLARY BLOOD GLUCOSE      POCT Blood Glucose.: 199 mg/dL (22 Jun 2021 11:50)  POCT Blood Glucose.: 118 mg/dL (22 Jun 2021 08:09)  POCT Blood Glucose.: 207 mg/dL (21 Jun 2021 21:13)  POCT Blood Glucose.: 142 mg/dL (21 Jun 2021 16:55)    I&O's Summary    21 Jun 2021 07:01  -  22 Jun 2021 07:00  --------------------------------------------------------  IN: 140 mL / OUT: 0 mL / NET: 140 mL    22 Jun 2021 07:01  -  22 Jun 2021 15:56  --------------------------------------------------------  IN: 360 mL / OUT: 1 mL / NET: 359 mL        Vital Signs Last 24 Hrs  T(C): 36.3 (22 Jun 2021 08:26), Max: 36.7 (21 Jun 2021 16:42)  T(F): 97.3 (22 Jun 2021 08:26), Max: 98 (21 Jun 2021 16:42)  HR: 93 (22 Jun 2021 08:26) (83 - 96)  BP: 122/78 (22 Jun 2021 08:26) (103/69 - 122/78)  BP(mean): --  RR: 18 (22 Jun 2021 08:26) (18 - 18)  SpO2: 94% (22 Jun 2021 08:26) (94% - 96%)      PHYSICAL EXAM:  EYES: EOMI, PERRLA,   NECK: Supple, No JVD  CHEST/LUNG: clear b/l, no wheeze  HEART: S1 S2; no murmurs   ABDOMEN: Soft, Nontender  EXTREMITIES:  no edema  NEUROLOGY: AO x 3 non-focal  SKIN: No rashes or lesions    LABS:                        11.7   6.47  )-----------( 206      ( 22 Jun 2021 09:55 )             36.2     06-22    137  |  99  |  18  ----------------------------<  172<H>  3.7   |  24  |  0.92    Ca    9.8      22 Jun 2021 09:55        CARDIAC MARKERS ( 21 Jun 2021 06:58 )  x     / x     / 229 U/L / x     / x                All consultant(s) notes reviewed and care discussed with other providers        Contact Number, Dr Nicole [phone]

## 2021-06-22 NOTE — PROGRESS NOTE ADULT - PROBLEM SELECTOR PLAN 1
lower ext doppler negative for DVT  electrolytes WNL  CPK mildly elevated  no intervention required at this time
lower ext doppler negative for DVT  electrolytes WNL  CPK coming down   no intervention required at this time

## 2021-06-22 NOTE — DISCHARGE NOTE PROVIDER - CARE PROVIDER_API CALL
Clark Alvarado  General Surgery  [address] [place] [address]  Phone: [phone]  Fax: [phone]  Follow Up Time:     Kirill Daily  INTERNAL MEDICINE  [facility], [address] [place] [id number]  Phone: [phone]  Fax: [phone]  Follow Up Time:

## 2021-06-22 NOTE — PROGRESS NOTE ADULT - PROBLEM SELECTOR PLAN 6
s/p complicated surgical history and post op course with infections and fistulization- see above plan
s/p complicated surgical history and post op course with infections and fistulization- see above plan

## 2021-06-22 NOTE — PROGRESS NOTE ADULT - PROBLEM SELECTOR PLAN 4
Last TTE shows EF 50-55%  continue current management
Last TTE shows EF 50-55%  continue current management

## 2021-06-22 NOTE — DISCHARGE NOTE PROVIDER - HOSPITAL COURSE
75 y/o female w/ hx NICM, ppm, aflutter on eliquis, asthma c/b respiratory failre requiring trach and peg in past, GI bleed, perforated diverticulitis S/P ex-lap and Lupe's procedure  in July 2019 with abdomen left open, multiple washouts, then abdominal closure with Vicryl mesh (fascia left open) and skin graft over wound, now with partial colectomy and end colosstomy, presenting with 3 days of bilateral leg cramps and burning pain, lower ext doppler negative for clot, electrolytes WNL, possible neuropathy vs. manifestation of viral illness given worsened cough and generalized myalgias.    ·   Leg pain, bilateral.  Plan: lower ext doppler negative for DVT  electrolytes WNL  CPK mildly elevated  no intervention required at this time.       ·  Cough.  Plan: increased from baseline  CT chest negative  pulmonary help appreciated.        ·  Enterocutaneous fistula.  Plan: Appreciate surgical eval  CT scan without evidence of acute intra-abdominal pathology and patient denying GI symptoms or pain at this time, "occasional twinges when I change position"  Trial of diet  f/u with Dr. Alvarado as outpatient.       ·  Nonischemic cardiomyopathy.  Plan: Last TTE shows EF 50-55%  continue current management.       ·   Atrial flutter.  Plan: c/w Eliquis  Currently rate controlled.         Diverticulitis. Plan: s/p complicated surgical history and post op course with infections and fistulization- see above plan.       ·  : Diabetes.  Plan: continue finger sticks with short acting insulin sliding scale  HbA1C 7.8.        ·   Asthma.  Plan: c/w home medications   albuterol PRN.   Pt medically stable for discharge and to follow up with her PCP

## 2021-06-22 NOTE — PROGRESS NOTE ADULT - ASSESSMENT
75 y/o female w/ hx NICM, ppm, aflutter on eliquis, asthma c/b respiratory failre requiring trach and peg in past, GI bleed, perforated diverticulitis S/P ex-lap and Lupe's procedure  in July 2019 with abdomen left open, multiple washouts, then abdominal closure with Vicryl mesh (fascia left open) and skin graft over wound, now with partial colectomy and end colosstomy, presenting with 3 days of bilateral leg cramps and burning pain, lower ext doppler negative for clot, electrolytes WNL, possible neuropathy vs. manifestation of viral illness given worsened cough and generalized myalgias.
75 woman with pmhx of DM2, HTN, COPD, Aflutter on Eliquis, HF with AICD ([employer], interrogated on 09/02/2019), and perforated diverticulitis requiring Lupe in July 2019 which complicated by enterocutaneous fistula and multiple abscesses requiring IR drainage (most recently in Dec 2019 on the left anterior abdominal wall) and admissions in the past for high EC fistula output, now presenting with 1 day of LE and generalized body pains, including flank spasm. CT scan without evidence of acute intra-abdominal pathology.    Recommendations:  - Patient with no GI symptoms or evidence of acute intra-abdominal pathology on imaging. No acute surgical intervention indicated   - care per primary team  - Please follow up with Dr. Alvarado as scheduled (per daughter scheduled to see within the next week)    please call back [facility] with any additional questions or concerns    [facility]   [id number] 
77 y/o female w/ hx NICM, ppm, aflutter on eliquis, asthma c/b respiratory failre requiring trach and peg in past, GI bleed, perforated diverticulitis S/P ex-lap and Lupe's procedure  in July 2019 with abdomen left open, multiple washouts, then abdominal closure with Vicryl mesh (fascia left open) and skin graft over wound, now with partial colectomy and end colosstomy, presenting with 3 days of bilateral leg cramps and burning pain, lower ext doppler negative for clot, electrolytes WNL, possible neuropathy vs. manifestation of viral illness given worsened cough and generalized myalgias.    Leg cramps:  NICM:  Asthma:  hx of perforated diverticulitis    6/21:  Leg cramps:: seems to be better  electrolytes OK: no dvt on dopplers: not much of SOB either: ct abdomens noted: ct chest done : report pending: to my prominent right sided minor fissure: no gross consolidation:   Asthma: minor wheezing: chest ct clear to me except prominent fissure on rt die: add Symbicort as well as cont singulair no need for oral prednisone at tis time:   NICM: cont curretn rx  hx of perforated diverticulitis: stable:  DVT propahyxlis  DW ACP     6/22:    Leg cramps:: seems to be better  electrolytes OK: no dvt on dopplers: ow totally resolved  not much of SOB either: ct abdomens noted: ct chest done : linear ateoecatsis n rt m,iddle lobe and few sbb 4 ,, nodules in RAJI unchanged from 20-18   Asthma: minor wheezing: chest ct clear to me except prominent fissure on rt die: add Symbicort as well as cont singulair no need for oral prednisone at tis time:   NICM: cont curretn rx  hx of perforated diverticulitis: stable:  DVT prophylaxis  DW ACP 
75 y/o female w/ hx NICM, ppm, aflutter on eliquis, asthma c/b respiratory failre requiring trach and peg in past, GI bleed, perforated diverticulitis S/P ex-lap and Lupe's procedure  in July 2019 with abdomen left open, multiple washouts, then abdominal closure with Vicryl mesh (fascia left open) and skin graft over wound, now with partial colectomy and end colosstomy, presenting with 3 days of bilateral leg cramps and burning pain, lower ext doppler negative for clot, electrolytes WNL, possible neuropathy vs. manifestation of viral illness given worsened cough and generalized myalgias.
negative - No discharge, No redness

## 2021-06-22 NOTE — DISCHARGE NOTE PROVIDER - NSDCCPCAREPLAN_GEN_ALL_CORE_FT
PRINCIPAL DISCHARGE DIAGNOSIS  Diagnosis: Leg pain, diffuse, left  Assessment and Plan of Treatment: lower extremity  doppler negative for DVT  electrolytes normal   CPK mildly elevated  no intervention required at this time      SECONDARY DISCHARGE DIAGNOSES  Diagnosis: Enterocutaneous fistula  Assessment and Plan of Treatment: Please follow up with your Surgeon,Dr. Alvarado as outpatient.  (per daughter scheduled to see within the next week)  CT scan without evidence of acute intra-abdominal pathology       Diagnosis: Atrial flutter  Assessment and Plan of Treatment: Atrial fibrillation is the most common heart rhythm problem.  The condition puts you at risk for has stroke and heart attack  It helps if you control your blood pressure, not drink more than 1-2 alcohol drinks per day, cut down on caffeine, getting treatment for over active thyroid gland, and get regular exercise  Call your doctor if you feel your heart racing or beating unusually, chest tightness or pain, lightheaded, faint, shortness of breath especially with exercise  It is important to take your heart medication as prescribed  You may be on anticoagulation which is very important to take as directed - you may need blood work to monitor drug levels      Diagnosis: Asthma  Assessment and Plan of Treatment: Asthma attacks happen when the airways in the lungs become narrow and inflamed  Asthma symptoms can include - Wheezing or noisy breathing, coughing, tight feeling in the chest, shortness of breath  Almost everyone with asthma has a quick-relief inhaler that works in 5- 10mins that they carry with them and long-term controller medicines control asthma and prevent future symptoms. People with frequent asthma symptoms take these 1 or 2 times each day.  You can stay away from things that cause your symptoms or make them worse. Doctors call these "triggers."  avoid them as much as possible. Some common triggers include - Dust, mold, animals, such as dogs and cats, pollen and plants, cigarette smoke, getting sick with a cold or flu (that's why it's important to get a flu shot), stress  Talk to your caregiver about an action plan for managing asthma attacks. This includes the use of a peak flow meter which measures the severity of the attack and medicines that can help stop the attack.   SEEK MEDICAL CARE IF:  You have wheezing, shortness of breath, or a cough even if taking medicine to prevent attacks.   You have thickening of sputum, sputum changes from clear or white to yellow, green, gray, or bloody.   You have any problems that may be related to the medicines you are taking (such as a rash, itching, swelling, or trouble breathing).  You are using a reliever medicine more than 2–3 times per week.  Your peak flow is still at 50–79% of personal best after following your action plan for 1 hour.  SEEK IMMEDIATE MEDICAL CARE IF:  You are short of breath even at rest,or with very little physical activity, chest pain, heart beating fast, bluish color to your lips or fingernails, fever, dizziness,   You seem to be getting worse and are unresponsive to treatment during an asthma attack.   Your peak flow is less than 50% of personal best.      Diagnosis: Cough  Assessment and Plan of Treatment: CT chest negative, please continue with current inhalers    Diagnosis: Diabetes  Assessment and Plan of Treatment: HgA1C this admission. 7.8-6/21/21   Make sure you get your HgA1c checked every three months.  If you take oral diabetes medications, check your blood glucose two times a day.  If you take insulin, check your blood glucose before meals and at bedtime.  It's important not to skip any meals.  Keep a log of your blood glucose results and always take it with you to your doctor appointments.  Keep a list of your current medications including injectables and over the counter medications and bring this medication list with you to all your doctor appointments.  If you have not seen your ophthalmologist this year call for appointment.  Check your feet daily for redness, sores, or openings. Do not self treat. If no improvement in two days call your primary care physician for an appointment.  Low blood sugar (hypoglycemia) is a blood sugar below 70mg/dl. Check your blood sugar if you feel signs/symptoms of hypoglycemia. If your blood sugar is below 70 take 15 grams of carbohydrates (ex 4 oz of apple juice, 3-4 glucose tablets, or 4-6 oz of regular soda) wait 15 minutes and repeat blood sugar to make sure it comes up above 70.  If your blood sugar is above 70 and you are due for a meal, have a meal.  If you are not due for a meal have a snack.  This snack helps keeps your blood sugar at a safe range.

## 2021-06-22 NOTE — PROGRESS NOTE ADULT - PROBLEM SELECTOR PLAN 9
diet- trial of regular DASH/TLC  dvt ppx- c/w eliquis  dispo- likely home in AM given no acute pathology, pt stable and no surgical intervention
discontinue at discharge

## 2021-06-22 NOTE — PROGRESS NOTE ADULT - PROBLEM SELECTOR PLAN 2
increased from baseline  CT chest negative  pulmonary help appreciated
increased from baseline  CT chest negative  pulmonary help appreciated

## 2021-06-22 NOTE — PROGRESS NOTE ADULT - SUBJECTIVE AND OBJECTIVE BOX
Date of Service: 06-22-21 @ 15:44    Patient is a 76y old  Female who presents with a chief complaint of leg cramps (22 Jun 2021 14:51)      Any change in ROS: Doing pretty good: no SOB     MEDICATIONS  (STANDING):  ALBUTerol    90 MICROgram(s) HFA Inhaler 1 Puff(s) Inhalation every 4 hours  apixaban 5 milliGRAM(s) Oral every 12 hours  aspirin enteric coated 81 milliGRAM(s) Oral daily  atorvastatin 20 milliGRAM(s) Oral at bedtime  benzonatate 100 milliGRAM(s) Oral every 8 hours  budesonide 160 MICROgram(s)/formoterol 4.5 MICROgram(s) Inhaler 2 Puff(s) Inhalation two times a day  dextrose 40% Gel 15 Gram(s) Oral once  dextrose 5%. 1000 milliLiter(s) (50 mL/Hr) IV Continuous <Continuous>  dextrose 5%. 1000 milliLiter(s) (100 mL/Hr) IV Continuous <Continuous>  dextrose 50% Injectable 25 Gram(s) IV Push once  dextrose 50% Injectable 12.5 Gram(s) IV Push once  dextrose 50% Injectable 25 Gram(s) IV Push once  furosemide    Tablet 40 milliGRAM(s) Oral daily  glucagon  Injectable 1 milliGRAM(s) IntraMuscular once  insulin lispro (ADMELOG) corrective regimen sliding scale   SubCutaneous three times a day before meals  insulin lispro (ADMELOG) corrective regimen sliding scale   SubCutaneous at bedtime  montelukast 10 milliGRAM(s) Oral daily  pantoprazole    Tablet 40 milliGRAM(s) Oral before breakfast  repaglinide 0.5 milliGRAM(s) Oral three times a day before meals  spironolactone 25 milliGRAM(s) Oral daily    MEDICATIONS  (PRN):    Vital Signs Last 24 Hrs  T(C): 36.3 (22 Jun 2021 08:26), Max: 36.7 (21 Jun 2021 16:42)  T(F): 97.3 (22 Jun 2021 08:26), Max: 98 (21 Jun 2021 16:42)  HR: 93 (22 Jun 2021 08:26) (83 - 96)  BP: 122/78 (22 Jun 2021 08:26) (103/69 - 122/78)  BP(mean): --  RR: 18 (22 Jun 2021 08:26) (18 - 18)  SpO2: 94% (22 Jun 2021 08:26) (94% - 96%)    I&O's Summary    21 Jun 2021 07:01  -  22 Jun 2021 07:00  --------------------------------------------------------  IN: 140 mL / OUT: 0 mL / NET: 140 mL    22 Jun 2021 07:01  -  22 Jun 2021 15:44  --------------------------------------------------------  IN: 360 mL / OUT: 1 mL / NET: 359 mL          Physical Exam:   GENERAL: NAD, well-groomed, well-developed  HEENT: COLLEEN/   Atraumatic, Normocephalic  ENMT: No tonsillar erythema, exudates, or enlargement; Moist mucous membranes, Good dentition, No lesions  NECK: Supple, No JVD, Normal thyroid  CHEST/LUNG: Clear to auscultaion  CVS: Regular rate and rhythm; No murmurs, rubs, or gallops  GI: : Soft, Nontender, Nondistended; Bowel sounds present  NERVOUS SYSTEM:  Alert & Oriented X3  EXTREMITIES: - edema  LYMPH: No lymphadenopathy noted  SKIN: No rashes or lesions  ENDOCRINOLOGY: No Thyromegaly  PSYCH: Appropriate    Labs:  27  CARDIAC MARKERS ( 21 Jun 2021 06:58 )  x     / x     / 229 U/L / x     / x                                11.7   6.47  )-----------( 206      ( 22 Jun 2021 09:55 )             36.2                         10.2   6.55  )-----------( 162      ( 20 Jun 2021 07:28 )             31.5                         10.4   7.73  )-----------( 148      ( 19 Jun 2021 16:56 )             31.9     06-22    137  |  99  |  18  ----------------------------<  172<H>  3.7   |  24  |  0.92  06-21    140  |  104  |  17  ----------------------------<  102<H>  3.9   |  22  |  0.92  06-20    138  |  102  |  17  ----------------------------<  110<H>  3.4<L>   |  23  |  0.96  06-19    138  |  101  |  21  ----------------------------<  82  4.6   |  22  |  0.95    Ca    9.8      22 Jun 2021 09:55  Ca    9.3      21 Jun 2021 06:58    TPro  5.8<L>  /  Alb  3.2<L>  /  TBili  0.4  /  DBili  x   /  AST  19  /  ALT  17  /  AlkPhos  82  06-20  TPro  6.5  /  Alb  3.3  /  TBili  0.2  /  DBili  x   /  AST  29  /  ALT  22  /  AlkPhos  101  06-19    CAPILLARY BLOOD GLUCOSE      POCT Blood Glucose.: 199 mg/dL (22 Jun 2021 11:50)  POCT Blood Glucose.: 118 mg/dL (22 Jun 2021 08:09)  POCT Blood Glucose.: 207 mg/dL (21 Jun 2021 21:13)  POCT Blood Glucose.: 142 mg/dL (21 Jun 2021 16:55)            Serum Pro-Brain Natriuretic Peptide: 118 pg/mL (06-19 @ 16:56)        RECENT CULTURES:        RESPIRATORY CULTURES:      ra< from: CT Chest No Cont (06.20.21 @ 17:41) >  EXAM:  CT CHEST                            PROCEDURE DATE:  06/20/2021            INTERPRETATION:  CLINICAL INFORMATION: Wheezing. Evaluate for pneumonia.    COMPARISON: CTs the chest dated 6/21/2020, 9/3/2019, and 9/10/2018.    CONTRAST/COMPLICATIONS:  IV Contrast: NONE  Oral Contrast: NONE  Complications: None reported at time of study completion    PROCEDURE:  CT of the Chest was performed.  Sagittal and coronal reformats were performed.    FINDINGS:    LUNGS AND AIRWAYS: Patent central airways.  Unchanged linear atelectasis of the right middle lobe. Left upper lobe 7 mm nodule (3:31) and left upper lobe 4 mm nodule (3:23) unchanged from 9/10/2018. Unchanged right lower lobe calcified granuloma.  PLEURA: No pleural effusion.  MEDIASTINUMAND MARIETTA: No lymphadenopathy.  VESSELS: Within normal limits.  HEART: Mild cardiomegaly. No pericardial effusion. Biventricular AICD is in place with leads intact.  CHEST WALL AND LOWER NECK: Within normal limits.  VISUALIZED UPPER ABDOMEN: Within normal limits.  BONES: Degenerative changes.    IMPRESSION:  No pneumonia.    Unchanged linear atelectasis of the right middle lobe.    Unchanged left upper lobe subcentimeter nodules.                OZZIE BETANCOURT MD; Resident Radiology  This documenthas been electronically signed.  MICHELE VIEIRA MD; Attending Radiologist  This document has been electronically signed. Jun 21 2021 10:50AM    < end of copied text >      Studies  Chest X-RAY  CT SCAN Chest   Venous Dopplers: LE:   CT Abdomen  Others      < from: CT Chest No Cont (06.20.21 @ 17:41) >        INTERPRETATION:  CLINICAL INFORMATION: Wheezing. Evaluate for pneumonia.    COMPARISON: CTs the chest dated 6/21/2020, 9/3/2019, and 9/10/2018.    CONTRAST/COMPLICATIONS:  IV Contrast: NONE  Oral Contrast: NONE  Complications: None reported at time of study completion    PROCEDURE:  CT of the Chest was performed.  Sagittal and coronal reformats were performed.    FINDINGS:    LUNGS AND AIRWAYS: Patent central airways.  Unchanged linear atelectasis of the right middle lobe. Left upper lobe 7 mm nodule (3:31) and left upper lobe 4 mm nodule (3:23) unchanged from 9/10/2018. Unchanged right lower lobe calcified granuloma.  PLEURA: No pleural effusion.  MEDIASTINUMAND MARIETTA: No lymphadenopathy.  VESSELS: Within normal limits.  HEART: Mild cardiomegaly. No pericardial effusion. Biventricular AICD is in place with leads intact.  CHEST WALL AND LOWER NECK: Within normal limits.  VISUALIZED UPPER ABDOMEN: Within normal limits.  BONES: Degenerative changes.    IMPRESSION:  No pneumonia.    Unchanged linear atelectasis of the right middle lobe.    Unchanged left upper lobe subcentimeter nodules.                OZZIE BETANCOURT MD; Resident Radiology  This documenthas been electronically signed.  MICHELE VIEIRA MD; Attending Radiologist    < end of copied text >

## 2021-06-22 NOTE — DISCHARGE NOTE PROVIDER - NSDCMRMEDTOKEN_GEN_ALL_CORE_FT
apixaban 5 mg oral tablet: 1 tab(s) orally every 12 hours  ASPIRIN EC 81 MG TABLET:   ATORVASTATIN 20 MG TABLET:   BENZONATATE 100 MG CAPSULE:   FOLIC ACID 1 MG TABLET:   furosemide 40 mg oral tablet: 1 tab(s) orally once a day  montelukast 10 mg oral tablet: 1 tab(s) orally once a day  omeprazole 40 mg oral delayed release capsule: 1 cap(s) orally once a day  repaglinide 0.5 mg oral tablet: 1 tab(s) orally 3 times a day (before meals)  spironolactone 25 mg oral tablet: 1 tab(s) orally once a day  Symbicort 160 mcg-4.5 mcg/inh inhalation aerosol: 2 puff(s) inhaled once a day (at bedtime)  Ventolin HFA 90 mcg/inh inhalation aerosol: 1 puff(s) inhaled every 4 hours-6 hours as needed

## 2021-06-22 NOTE — PROGRESS NOTE ADULT - ASSESSMENT
Preventive Health Recommendations  Female Ages 50 - 64    Yearly exam: See your health care provider every year in order to  o Review health changes.   o Discuss preventive care.    o Review your medicines if your doctor has prescribed any.      Get a Pap test every three years (unless you have an abnormal result and your provider advises testing more often).    If you get Pap tests with HPV test, you only need to test every 5 years, unless you have an abnormal result.     You do not need a Pap test if your uterus was removed (hysterectomy) and you have not had cancer.    You should be tested each year for STDs (sexually transmitted diseases) if you're at risk.     Have a mammogram every 1 to 2 years.    Have a colonoscopy at age 50, or have a yearly FIT test (stool test). These exams screen for colon cancer.      Have a cholesterol test every 5 years, or more often if advised.    Have a diabetes test (fasting glucose) every three years. If you are at risk for diabetes, you should have this test more often.     If you are at risk for osteoporosis (brittle bone disease), think about having a bone density scan (DEXA).    Shots: Get a flu shot each year. Get a tetanus shot every 10 years.    Nutrition:     Eat at least 5 servings of fruits and vegetables each day.    Eat whole-grain bread, whole-wheat pasta and brown rice instead of white grains and rice.    Get adequate Calcium and Vitamin D.     Lifestyle    Exercise at least 150 minutes a week (30 minutes a day, 5 days a week). This will help you control your weight and prevent disease.    Limit alcohol to one drink per day.    No smoking.     Wear sunscreen to prevent skin cancer.     See your dentist every six months for an exam and cleaning.    See your eye doctor every 1 to 2 years.     72 YO woman with PMH of asthma with frequent exacerbations, A-flutter on Eliquis, HFrEF s/p AICD, IDDM2, and HTN presenting with increasing SOB over the past few days. 72 YO woman with PMH of asthma with frequent exacerbations, A-flutter on Eliquis, HFrEF s/p AICD, IDDM2, and HTN presenting with increasing SOB over the past few days 2/2 asthma exacerbation. Today feels well and reports resolution of symptoms.

## 2021-06-25 ENCOUNTER — APPOINTMENT (OUTPATIENT)
Dept: SURGERY | Facility: CLINIC | Age: 76
End: 2021-06-25

## 2021-06-30 ENCOUNTER — APPOINTMENT (OUTPATIENT)
Dept: ELECTROPHYSIOLOGY | Facility: CLINIC | Age: 76
End: 2021-06-30
Payer: MEDICARE

## 2021-06-30 VITALS
SYSTOLIC BLOOD PRESSURE: 105 MMHG | WEIGHT: 169 LBS | HEART RATE: 104 BPM | DIASTOLIC BLOOD PRESSURE: 69 MMHG | BODY MASS INDEX: 31.1 KG/M2 | HEIGHT: 62 IN | OXYGEN SATURATION: 96 %

## 2021-06-30 PROCEDURE — 99072 ADDL SUPL MATRL&STAF TM PHE: CPT

## 2021-06-30 PROCEDURE — 93284 PRGRMG EVAL IMPLANTABLE DFB: CPT

## 2021-06-30 RX ORDER — BENZONATATE 100 MG/1
100 CAPSULE ORAL 3 TIMES DAILY
Refills: 0 | Status: DISCONTINUED | COMMUNITY
Start: 2020-03-09 | End: 2021-06-30

## 2021-07-13 NOTE — PROGRESS NOTE ADULT - ATTENDING COMMENTS
Bárbara Golden contacted this therapist with few complaints and was very upset due to it  However the therapist was in  The session and assured him a call back  Later the therapist contacted him and allowed him to express his feelings and emotions openly  He was angry and upset about the following issues:   1) He complained that the Zoloft was giving him the side effects and he wanted to consult the PCP  He had contacted the PCPs office multiple times but he was not connected to the PCP  Bárbara Golden has been waiting for 2-3 weeks  2) Bárbara Golden became angry when a  told him that the PCP was not present and hung up on him  Bárbara Golden contacted her again to confront her but she didn't answer  Sydney Apgar wanted to go to the clinic and report the matter to the supervisor  3) Bárbara Golden involved his mom Gregorio Iraheta, who is also a client of [facility]  She contacted the supervisor and reported the matter to the supervisor  The therapist used active listening and validated Rauls feelings  Bárbara Golden was able to calm down after expressing his feelings in detail  Patient seen and examined and agree with above.  Patient is tolerating thickened clear liquid diet. Hemodynamically appropriate as well as respiratory state.   Current management includes:  1) sinus tachycardia- will monitor and allow permissive tachycardia unless it is >125 bpm persistently  -denies chest pain or dyspnea    2) Hyperglycemia with DM type 2- continue insulin in TPN  -will start to wean off the TPN once able to tolerated a full diet  -monitor blood glucose with goal BG < 180    3) systolic heart failure - continue current medications  - appreciate HF team recommendations    4) Chronic atrial fibrillation- continue therapeautic lovenox    Leukocytosis improving. Patient overall is hemodynamically stable. WIll transfer to telemetry unit.     Procrit for total of 5 days for anemia.

## 2021-07-30 NOTE — DISCHARGE NOTE ADULT - MEDICATION SUMMARY - MEDICATIONS TO CHANGE
code: stroke
I will SWITCH the dose or number of times a day I take the medications listed below when I get home from the hospital:    predniSONE 10 mg oral tablet  -- 5 tabs on first day  3 tabs in the morning & 2 tabs in the evening for 3 days  2 tabs in the morning & evening for 3 days  3 tabs for 3 days  2 tabs for 3 days  1 tab for 3 days

## 2021-08-02 ENCOUNTER — APPOINTMENT (OUTPATIENT)
Dept: SURGERY | Facility: CLINIC | Age: 76
End: 2021-08-02
Payer: MEDICARE

## 2021-08-02 VITALS
SYSTOLIC BLOOD PRESSURE: 120 MMHG | HEIGHT: 62 IN | DIASTOLIC BLOOD PRESSURE: 80 MMHG | BODY MASS INDEX: 30.73 KG/M2 | WEIGHT: 167 LBS | OXYGEN SATURATION: 98 % | RESPIRATION RATE: 16 BRPM | HEART RATE: 105 BPM

## 2021-08-02 PROCEDURE — 99213 OFFICE O/P EST LOW 20 MIN: CPT

## 2021-08-02 NOTE — HISTORY OF PRESENT ILLNESS
[de-identified] : Pretty Dolan is a 76 y/o female S/P ex-lap and Lupe's procedure for perforated diverticulitis in July 2019 with abdomen left open, multiple washouts, then abdominal closure with Vicryl mesh (fascia left open) and skin graft over wound. \par \par She presents today with complaints of intermittent bilateral mid abdominal pain that occurs when coughing.  The pain typically lasts a few minutes and goes away on its own.  She reports her ostomy has been functioning normally.  She has no nausea and has a good appetite, in fact has gained a few pounds since her last visit.  Her fistula has had intermittent minimal drainage, unchanged from prior.

## 2021-08-02 NOTE — PHYSICAL EXAM
[de-identified] : Well-appearing, appears stated age, no acute distress, uses wheelchair [de-identified] : Normocephalic, sclerae anicteric, mucous membranes moist [de-identified] : Normal respirations [de-identified] : Soft, nontender, nondistended.  Right upper quadrant colostomy appears normal with stool in bag.  Midline ventral incisional hernia is soft, reducible, with no overlying skin changes.  2 small fistula sites in lower abdomen with granulation tissue at their opening sites.  Skin around them is clean and dry and non-erythematous.

## 2021-08-02 NOTE — PLAN
[FreeTextEntry1] : Continue current management\par Continue dressing changes\par Follow-up as needed\par She and her daughter know to contact me should she develop any worsening abdominal pain, fevers, or change in ostomy output.

## 2021-08-02 NOTE — ASSESSMENT
[FreeTextEntry1] : 74F s/p ex-lap and Lupe's procedure for perforated diverticulitis in July 2019 with abdomen left open, multiple washouts, then abdominal closure with vicryl mesh (fascia left open) and skin graft over wound.  She subsequently developed a rectal stump leak, with persistent low output colocutaneous fistula x2 which are well controlled.  No evidence of acute cellulitis or infection.\par \par The pain that she describes is likely secondary to her large incisional hernia.  There is no evidence of incarceration or obstruction at this time.  We have discussed the hernia previously and she does not want to undergo incisional hernia repair.\par \par \par

## 2021-08-16 NOTE — ED PROVIDER NOTE - NSCAREINITIATED _GEN_ER
"  Problem: Device-Related Complication Risk (Artificial Airway)  Goal: Optimal Device Function  Outcome: No Change     Problem: Communication Impairment (Artificial Airway)  Goal: Effective Communication  Outcome: Improving   RT PROGRESS NOTE     DATA:     CURRENT SETTINGS:             TRACH TYPE / SIZE:  #6 bivona changed 7/28             MODE:   20L and 26% tm with cuff inflated                ACTION:             THERAPIES:   albuterol bid, mucomyst bid, saline bid, metaneb bid             SUCTION:                           FREQUENCY:   x4                        AMOUNT:   large to copious                        CONSISTENCY:   thick to tenacious, lavage required x1                        COLOR:   yellow to clear to white thick             SPONTANEOUS COUGH EFFORT/STRENGTH OF EFFORT (not elicited by suctioning): moderate to strong                              WEANING PHASE:   2                        WEAN MODE:    pmv                        WEAN TIME:   started at 08:10                             RESPONSE:             BS:   coarse             VITAL SIGNS:   Blood pressure (!) 151/91, pulse 84, temperature 98.8  F (37.1  C), temperature source Oral, resp. rate 30, height 1.803 m (5' 10.98\"), weight 69 kg (152 lb 2 oz), SpO2 96 %.                 EMOTIONAL NEEDS / CONCERNS:  no                RISK FOR SELF DECANNULATION:  yes                        RISK DUE TO:  confusion, impulsive                        INTERVENTION/S IN PLACE IS/ARE:  Bilateral mitts         NOTE / PLAN:   Continue pulmonary toilet.    " Melanie Charles)

## 2021-08-28 ENCOUNTER — EMERGENCY (EMERGENCY)
Facility: HOSPITAL | Age: 76
LOS: 1 days | Discharge: HOME | End: 2021-08-28
Attending: EMERGENCY MEDICINE
Payer: MEDICARE

## 2021-08-28 VITALS
HEART RATE: 99 BPM | DIASTOLIC BLOOD PRESSURE: 67 MMHG | SYSTOLIC BLOOD PRESSURE: 113 MMHG | TEMPERATURE: 98 F | RESPIRATION RATE: 18 BRPM | OXYGEN SATURATION: 98 %

## 2021-08-28 VITALS
RESPIRATION RATE: 20 BRPM | OXYGEN SATURATION: 97 % | TEMPERATURE: 99 F | HEART RATE: 100 BPM | HEIGHT: 62 IN | DIASTOLIC BLOOD PRESSURE: 69 MMHG | SYSTOLIC BLOOD PRESSURE: 121 MMHG | WEIGHT: 169.98 LBS

## 2021-08-28 DIAGNOSIS — Z90.49 ACQUIRED ABSENCE OF OTHER SPECIFIED PARTS OF DIGESTIVE TRACT: Chronic | ICD-10-CM

## 2021-08-28 LAB
ALBUMIN SERPL ELPH-MCNC: 3.6 G/DL — SIGNIFICANT CHANGE UP (ref 3.3–5)
ALP SERPL-CCNC: 105 U/L — SIGNIFICANT CHANGE UP (ref 40–120)
ALT FLD-CCNC: 11 U/L — SIGNIFICANT CHANGE UP (ref 10–45)
ANION GAP SERPL CALC-SCNC: 14 MMOL/L — SIGNIFICANT CHANGE UP (ref 5–17)
APPEARANCE UR: CLEAR — SIGNIFICANT CHANGE UP
AST SERPL-CCNC: 18 U/L — SIGNIFICANT CHANGE UP (ref 10–40)
BACTERIA # UR AUTO: NEGATIVE — SIGNIFICANT CHANGE UP
BASE EXCESS BLDV CALC-SCNC: 7.4 MMOL/L — HIGH (ref -2–2)
BASOPHILS # BLD AUTO: 0.03 K/UL — SIGNIFICANT CHANGE UP (ref 0–0.2)
BASOPHILS NFR BLD AUTO: 0.3 % — SIGNIFICANT CHANGE UP (ref 0–2)
BILIRUB SERPL-MCNC: 0.2 MG/DL — SIGNIFICANT CHANGE UP (ref 0.2–1.2)
BILIRUB UR-MCNC: NEGATIVE — SIGNIFICANT CHANGE UP
BUN SERPL-MCNC: 33 MG/DL — HIGH (ref 7–23)
CA-I SERPL-SCNC: 1.23 MMOL/L — SIGNIFICANT CHANGE UP (ref 1.15–1.33)
CALCIUM SERPL-MCNC: 9.7 MG/DL — SIGNIFICANT CHANGE UP (ref 8.4–10.5)
CHLORIDE BLDV-SCNC: 99 MMOL/L — SIGNIFICANT CHANGE UP (ref 96–108)
CHLORIDE SERPL-SCNC: 97 MMOL/L — SIGNIFICANT CHANGE UP (ref 96–108)
CO2 BLDV-SCNC: 36 MMOL/L — HIGH (ref 22–26)
CO2 SERPL-SCNC: 26 MMOL/L — SIGNIFICANT CHANGE UP (ref 22–31)
COLOR SPEC: SIGNIFICANT CHANGE UP
CREAT SERPL-MCNC: 1.02 MG/DL — SIGNIFICANT CHANGE UP (ref 0.5–1.3)
DIFF PNL FLD: ABNORMAL
EOSINOPHIL # BLD AUTO: 0.22 K/UL — SIGNIFICANT CHANGE UP (ref 0–0.5)
EOSINOPHIL NFR BLD AUTO: 2.1 % — SIGNIFICANT CHANGE UP (ref 0–6)
EPI CELLS # UR: 2 /HPF — SIGNIFICANT CHANGE UP
GAS PNL BLDV: 136 MMOL/L — SIGNIFICANT CHANGE UP (ref 136–145)
GAS PNL BLDV: SIGNIFICANT CHANGE UP
GAS PNL BLDV: SIGNIFICANT CHANGE UP
GLUCOSE BLDV-MCNC: 95 MG/DL — SIGNIFICANT CHANGE UP (ref 70–99)
GLUCOSE SERPL-MCNC: 97 MG/DL — SIGNIFICANT CHANGE UP (ref 70–99)
GLUCOSE UR QL: NEGATIVE — SIGNIFICANT CHANGE UP
HCO3 BLDV-SCNC: 34 MMOL/L — HIGH (ref 22–29)
HCT VFR BLD CALC: 35.4 % — SIGNIFICANT CHANGE UP (ref 34.5–45)
HCT VFR BLDA CALC: 36 % — SIGNIFICANT CHANGE UP (ref 34.5–46.5)
HGB BLD CALC-MCNC: 12 G/DL — SIGNIFICANT CHANGE UP (ref 11.7–16.1)
HGB BLD-MCNC: 11.4 G/DL — LOW (ref 11.5–15.5)
IMM GRANULOCYTES NFR BLD AUTO: 0.2 % — SIGNIFICANT CHANGE UP (ref 0–1.5)
KETONES UR-MCNC: NEGATIVE — SIGNIFICANT CHANGE UP
LACTATE BLDV-MCNC: 1.8 MMOL/L — SIGNIFICANT CHANGE UP (ref 0.7–2)
LEUKOCYTE ESTERASE UR-ACNC: ABNORMAL
LIDOCAIN IGE QN: 16 U/L — SIGNIFICANT CHANGE UP (ref 7–60)
LYMPHOCYTES # BLD AUTO: 1.87 K/UL — SIGNIFICANT CHANGE UP (ref 1–3.3)
LYMPHOCYTES # BLD AUTO: 18 % — SIGNIFICANT CHANGE UP (ref 13–44)
MCHC RBC-ENTMCNC: 30.8 PG — SIGNIFICANT CHANGE UP (ref 27–34)
MCHC RBC-ENTMCNC: 32.2 GM/DL — SIGNIFICANT CHANGE UP (ref 32–36)
MCV RBC AUTO: 95.7 FL — SIGNIFICANT CHANGE UP (ref 80–100)
MONOCYTES # BLD AUTO: 0.98 K/UL — HIGH (ref 0–0.9)
MONOCYTES NFR BLD AUTO: 9.4 % — SIGNIFICANT CHANGE UP (ref 2–14)
NEUTROPHILS # BLD AUTO: 7.26 K/UL — SIGNIFICANT CHANGE UP (ref 1.8–7.4)
NEUTROPHILS NFR BLD AUTO: 70 % — SIGNIFICANT CHANGE UP (ref 43–77)
NITRITE UR-MCNC: NEGATIVE — SIGNIFICANT CHANGE UP
NRBC # BLD: 0 /100 WBCS — SIGNIFICANT CHANGE UP (ref 0–0)
PCO2 BLDV: 56 MMHG — HIGH (ref 39–42)
PH BLDV: 7.39 — SIGNIFICANT CHANGE UP (ref 7.32–7.43)
PH UR: 6.5 — SIGNIFICANT CHANGE UP (ref 5–8)
PLATELET # BLD AUTO: 187 K/UL — SIGNIFICANT CHANGE UP (ref 150–400)
PO2 BLDV: 31 MMHG — SIGNIFICANT CHANGE UP (ref 25–45)
POTASSIUM BLDV-SCNC: 4.3 MMOL/L — SIGNIFICANT CHANGE UP (ref 3.5–5.1)
POTASSIUM SERPL-MCNC: 4.1 MMOL/L — SIGNIFICANT CHANGE UP (ref 3.5–5.3)
POTASSIUM SERPL-SCNC: 4.1 MMOL/L — SIGNIFICANT CHANGE UP (ref 3.5–5.3)
PROT SERPL-MCNC: 6.9 G/DL — SIGNIFICANT CHANGE UP (ref 6–8.3)
PROT UR-MCNC: ABNORMAL
RBC # BLD: 3.7 M/UL — LOW (ref 3.8–5.2)
RBC # FLD: 14.5 % — SIGNIFICANT CHANGE UP (ref 10.3–14.5)
RBC CASTS # UR COMP ASSIST: 273 /HPF — HIGH (ref 0–4)
SAO2 % BLDV: 51.1 % — LOW (ref 67–88)
SARS-COV-2 RNA SPEC QL NAA+PROBE: SIGNIFICANT CHANGE UP
SODIUM SERPL-SCNC: 137 MMOL/L — SIGNIFICANT CHANGE UP (ref 135–145)
SP GR SPEC: 1.02 — SIGNIFICANT CHANGE UP (ref 1.01–1.02)
UROBILINOGEN FLD QL: NEGATIVE — SIGNIFICANT CHANGE UP
WBC # BLD: 10.38 K/UL — SIGNIFICANT CHANGE UP (ref 3.8–10.5)
WBC # FLD AUTO: 10.38 K/UL — SIGNIFICANT CHANGE UP (ref 3.8–10.5)
WBC UR QL: 8 /HPF — HIGH (ref 0–5)

## 2021-08-28 PROCEDURE — 83605 ASSAY OF LACTIC ACID: CPT

## 2021-08-28 PROCEDURE — 83690 ASSAY OF LIPASE: CPT

## 2021-08-28 PROCEDURE — 71045 X-RAY EXAM CHEST 1 VIEW: CPT | Mod: 26

## 2021-08-28 PROCEDURE — 82947 ASSAY GLUCOSE BLOOD QUANT: CPT

## 2021-08-28 PROCEDURE — 85014 HEMATOCRIT: CPT

## 2021-08-28 PROCEDURE — U0005: CPT

## 2021-08-28 PROCEDURE — 74177 CT ABD & PELVIS W/CONTRAST: CPT | Mod: 26,MA

## 2021-08-28 PROCEDURE — 85025 COMPLETE CBC W/AUTO DIFF WBC: CPT

## 2021-08-28 PROCEDURE — 84132 ASSAY OF SERUM POTASSIUM: CPT

## 2021-08-28 PROCEDURE — 82435 ASSAY OF BLOOD CHLORIDE: CPT

## 2021-08-28 PROCEDURE — 82803 BLOOD GASES ANY COMBINATION: CPT

## 2021-08-28 PROCEDURE — 71045 X-RAY EXAM CHEST 1 VIEW: CPT

## 2021-08-28 PROCEDURE — 99285 EMERGENCY DEPT VISIT HI MDM: CPT

## 2021-08-28 PROCEDURE — 99284 EMERGENCY DEPT VISIT MOD MDM: CPT | Mod: 25

## 2021-08-28 PROCEDURE — 80053 COMPREHEN METABOLIC PANEL: CPT

## 2021-08-28 PROCEDURE — U0003: CPT

## 2021-08-28 PROCEDURE — 74177 CT ABD & PELVIS W/CONTRAST: CPT | Mod: MA

## 2021-08-28 PROCEDURE — 87086 URINE CULTURE/COLONY COUNT: CPT

## 2021-08-28 PROCEDURE — 81001 URINALYSIS AUTO W/SCOPE: CPT

## 2021-08-28 PROCEDURE — 84295 ASSAY OF SERUM SODIUM: CPT

## 2021-08-28 PROCEDURE — 76937 US GUIDE VASCULAR ACCESS: CPT

## 2021-08-28 PROCEDURE — 85018 HEMOGLOBIN: CPT

## 2021-08-28 PROCEDURE — 82330 ASSAY OF CALCIUM: CPT

## 2021-08-28 PROCEDURE — 76937 US GUIDE VASCULAR ACCESS: CPT | Mod: 26

## 2021-08-28 RX ORDER — CEPHALEXIN 500 MG
500 CAPSULE ORAL ONCE
Refills: 0 | Status: DISCONTINUED | OUTPATIENT
Start: 2021-08-28 | End: 2021-08-28

## 2021-08-28 RX ORDER — MORPHINE SULFATE 50 MG/1
4 CAPSULE, EXTENDED RELEASE ORAL ONCE
Refills: 0 | Status: DISCONTINUED | OUTPATIENT
Start: 2021-08-28 | End: 2021-08-28

## 2021-08-28 RX ORDER — CEPHALEXIN 500 MG
500 CAPSULE ORAL ONCE
Refills: 0 | Status: COMPLETED | OUTPATIENT
Start: 2021-08-28 | End: 2021-08-28

## 2021-08-28 RX ORDER — CEPHALEXIN 500 MG
1 CAPSULE ORAL
Qty: 14 | Refills: 0
Start: 2021-08-28 | End: 2021-09-03

## 2021-08-28 RX ADMIN — Medication 500 MILLIGRAM(S): at 09:38

## 2021-08-28 NOTE — ED ADULT NURSE NOTE - NSICDXPASTSURGICALHX_GEN_ALL_CORE_FT
PAST SURGICAL HISTORY:  AICD (Automatic Cardioverter/Defibrillator) Present inserted in Aug, 2008. Due for battery change in 1 month. ( [employer]) . Inserted by Dr Duffy    S/P cholecystectomy     Status post left hemicolectomy

## 2021-08-28 NOTE — ED ADULT NURSE NOTE - OBJECTIVE STATEMENT
76 y.o F presents to the ED from home c/o abdominal pain. Patient is awake, alert and speaking coherently. As per patient for the past few hours she has had an intermittent pain in her left lower abdomen. Patient presents A&Ox3, afebrile and ambulatory; denies fever/chills, denies chest pain, SOB, abdomen- has colostomy bag and "draining abdominal wound from years ago", denies nausea, vomiting and diarrhea (has been having normal output in colostomy bag).

## 2021-08-28 NOTE — ED PROVIDER NOTE - ATTENDING CONTRIBUTION TO CARE
See MDM above.  The patient was serially evaluated throughout emergency department course. There was no acute deterioration up to this time in the department. Patient has demonstrated clinical improvement and is stable, feels better at this time according to emergency department team. Agree with goals/plan of emergency department care as described in this physician's electronic medical record, including diagnostics, therapeutics and consultation as clinically warranted. Will discharge home with close outpatient follow up with primary care physician/provider and specialist if necessary. The patient and/or family was educated on concerning signs and features to return to the emergency department, in layman terms, including but not limited to: nausea, vomiting, fever, chills, persistent/worsening symptoms or any concerns at all. No immediate life threatening issues present on history, clinical exam, or any diagnostic evaluation. The patient is a safe disposition home, has capacity and insight into their condition, is ambulatory in the Emergency Department with no further questions and will follow up with their doctor(s) this week. Diagnosis, prognosis, natural history and treatment was discussed with patient and/or family. The patient and/or family were given the opportunity to ask questions and have them answered in full. The patient and/or family are with capacity and insight into the situation, treatment, risks, benefits, alternative therapies, and understand that they can ask any further questions if needed. Patient and/or family/guardian understands anticipatory guidance and was given strict return and follow up precautions. The patient and/or family/guardian has been informed, in layman terms, of all concerning signs and symptoms to return to Emergency Department, the necessity to follow up with the PMD/Clinic/follow up provided within 2-3 days was explained, and the patient and/or family/guardian reports understanding of above with capacity and insight. The patient and/or family/guardian were informed of any results of their tests and are were encouraged to follow up on the findings with their doctor as well as the need to inform their doctor of any results. The patient and/or family/guardian are aware of the need to follow up with repeat testing as applicable and report understanding of the above with capacity and insight. The patient and/or family/guardian was made aware of any pending test results at the time of discharge and of the need to call back for the final results a well as the need to inform their doctor of the results

## 2021-08-28 NOTE — ED PROVIDER NOTE - PHYSICAL EXAMINATION
Barbara Lane MD  GENERAL: Patient awake alert NAD.  HEENT: NC/AT, Moist mucous membranes, PERRL, EOMI.  LUNGS: CTAB, no wheezes or crackles.   CARDIAC: RRR, no m/r/g.    ABDOMEN: Soft, +tender at LLQ, ND, No rebound, guarding. No CVA tenderness. Chronic wound lower mid abd. R sided ostomy, normal looking output, non bloody.  EXT: No edema. No calf tenderness.    MSK: no pain with movement, no deformities.  NEURO: A&Ox3. Moving all extremities.  SKIN: Warm and dry. No rash.  PSYCH: Normal affect.

## 2021-08-28 NOTE — ED ADULT NURSE NOTE - NSICDXPASTMEDICALHX_GEN_ALL_CORE_FT
PAST MEDICAL HISTORY:  Asthma     Atrial flutter     Cardiac Pacemaker     Cardiomyopathy     Congestive Heart Failure     Diabetes     Diverticulitis     Gout     HTN - Hypertension     Kidney stone     Patient is Nondenominational     Refusal of blood transfusions as patient is Nondenominational     Vertigo

## 2021-08-28 NOTE — ED PROVIDER NOTE - PATIENT PORTAL LINK FT
You can access the FollowMyHealth Patient Portal offered by [facility] by registering at the following website: [email or web address]. By joining [employer]’s FollowMyHealth portal, you will also be able to view your health information using other applications (apps) compatible with our system.

## 2021-08-28 NOTE — ED ADULT NURSE NOTE - NSIMPLEMENTINTERV_GEN_ALL_ED
Implemented All Universal Safety Interventions:  [employer] to call system. Call bell, personal items and telephone within reach. Instruct patient to call for assistance. Room bathroom lighting operational. Non-slip footwear when patient is off stretcher. Physically safe environment: no spills, clutter or unnecessary equipment. Stretcher in lowest position, wheels locked, appropriate side rails in place.

## 2021-08-28 NOTE — ED PROVIDER NOTE - CLINICAL SUMMARY MEDICAL DECISION MAKING FREE TEXT BOX
Domingo - 75 y/o female w/ hx NICM, ppm, aflutter on eliquis, asthma c/b respiratory failure requiring trach and peg in past, GI bleed, perforated diverticulitis S/P ex-lap and Lupe's procedure  in July 2019 with abdomen left open, multiple washouts, then abdominal closure with Vicryl mesh (fascia left open) and skin graft over wound, now with partial colectomy and end colostomy, presenting with LLQ abd pain.  Concern for colitis vs SBO. Will check basic labs and CT abd pelvis with IV contrast.

## 2021-08-28 NOTE — ED PROVIDER NOTE - PROGRESS NOTE DETAILS
hugh: CT scan unchanged from prior - large nonincarcerated ventral hernia, discussed results w/ daughter, patient and will dc w/ keflex for uti. Pt to f/u with her surgeon for further concerns re: her hernia. Pt was re-evaluated at bedside, VSS, feeling better overall. Results were discussed with patient as well as return precautions and follow up plan with PCP and/or specialist. Time was taken to answer any questions that the patient had before providing them with discharge paperwork.

## 2021-08-28 NOTE — ED PROVIDER NOTE - DOMESTIC TRAVEL HIGH RISK QUESTION
78 Y M with pmh of HTN, HLD presents after syncopal event this afternoon. Per patient, he was shopping in grocery store, began feeling very warm, dizzy (described as lightheadedness), and started to have a headache (band-like). This occurred around 3PM. He was standing on line when this happened, and then passed out. He says that he did not recall any palpitations or shortness of breath, but did urinate himself when he was down, which he approximates was about a minute. He was not confused when he woke up, and witnesses did not report any jerking motions. He did not bite his tongue. This has never happened before. Of note, he says he had not eaten since 5PM the previous night. He is active, walks many blocks each day. Denies any recent weight loss. He denied any chest pain, cough, fevers, chills, recent illnesses, diarrhea, nausea, vomiting, constipation, abdominal pain, recent travel, sick contacts.    ED Course:  T 98.7F, P 140, /75, R 18, S 100% on RA at rest. He was observed to desaturate to 80's when moving. CTA chest was negative for PE. CTH negative.  No

## 2021-08-28 NOTE — ED PROVIDER NOTE - OBJECTIVE STATEMENT
75 y/o female w/ hx NICM, ppm, aflutter on eliquis, asthma c/b respiratory failure requiring trach and peg in past, GI bleed, perforated diverticulitis S/P ex-lap and Lupe's procedure  in July 2019 with abdomen left open, multiple washouts, then abdominal closure with Vicryl mesh (fascia left open) and skin graft over wound, now with partial colectomy and end colostomy, presenting with LLQ abd pain. Pain started this night a few hrs ago, woke her up. Non radiating. No N/V. Last bowel movement Friday morning, normal. No melena or bright red blood in stool. No hematuria or dysuria. No fevers or chills  Pt has chronic abd wound

## 2021-08-28 NOTE — ED PROVIDER NOTE - NSICDXPASTSURGICALHX_GEN_ALL_CORE_FT
PAST SURGICAL HISTORY:  AICD (Automatic Cardioverter/Defibrillator) Present inserted in Aug, 2008. Due for battery change in 1 month. ( [employer]) . Inserted by Dr Duffy    S/P cholecystectomy     Status post left hemicolectomy

## 2021-08-28 NOTE — ED PROVIDER NOTE - NSFOLLOWUPINSTRUCTIONS_ED_ALL_ED_FT
Please follow up with your primary care provider for further concerns you may have regarding your general health. Attached you will find your results from today's visit. Continue taking your medications as prescribed and keep your upcoming medical appointments.    Please follow up with your surgeon for further concerns regarding your large ventral hernia and take your antibiotics as prescribed for your urinary tract infection.

## 2021-08-28 NOTE — ED ADULT NURSE REASSESSMENT NOTE - NS ED NURSE REASSESS COMMENT FT1
Patient VSS, pending lab results and CT scan- RN unable to place IVL or give IV medications. Dr Lane at the bedside to place ultrasound guided IV.
Pt received from SK[employer] [place] in purple, alert and oriented times four, breathing spontaneous, unlabored without distress on room air. Pt refused pain meds states pain has resolved since here. Pt awaiting CT.

## 2021-08-28 NOTE — ED PROVIDER NOTE - NS ED ROS FT
GENERAL: No fever, chills  EYES: no vision changes, no discharge.   ENT: no difficulty swallowing or speaking   CARDIAC: no chest pain/pressure, SOB, lower extremity swelling  PULMONARY: no cough, SOB  GI: +abdominal pain, no n/v/d  : no dysuria  SKIN: no rashes  NEURO: no headache, lightheadedness, paresthesia  MSK: No joint pain, myalgia, weakness.

## 2021-08-28 NOTE — ED PROVIDER NOTE - NSICDXPASTMEDICALHX_GEN_ALL_CORE_FT
PAST MEDICAL HISTORY:  Asthma     Atrial flutter     Cardiac Pacemaker     Cardiomyopathy     Congestive Heart Failure     Diabetes     Diverticulitis     Gout     HTN - Hypertension     Kidney stone     Patient is Mandaen     Refusal of blood transfusions as patient is Mandaen     Vertigo

## 2021-08-29 LAB
CULTURE RESULTS: SIGNIFICANT CHANGE UP
SPECIMEN SOURCE: SIGNIFICANT CHANGE UP

## 2021-09-23 ENCOUNTER — NON-APPOINTMENT (OUTPATIENT)
Age: 76
End: 2021-09-23

## 2021-09-23 NOTE — PROGRESS NOTE ADULT - PROBLEM SELECTOR PLAN 2
·  Problem: CHF (congestive heart failure).  Recommendation: -Possible NSTEMI  -s/p LHC with normal coronaries, LVedp: 17  -c/w Lasix 40mg po daily per HF team  -s/p echo-mod lv dysfx Eucrisa Counseling: Patient may experience a mild burning sensation during topical application. Eucrisa is not approved in children less than 2 years of age.

## 2021-09-30 ENCOUNTER — APPOINTMENT (OUTPATIENT)
Dept: ELECTROPHYSIOLOGY | Facility: CLINIC | Age: 76
End: 2021-09-30
Payer: MEDICARE

## 2021-09-30 ENCOUNTER — NON-APPOINTMENT (OUTPATIENT)
Age: 76
End: 2021-09-30

## 2021-09-30 PROCEDURE — 93295 DEV INTERROG REMOTE 1/2/MLT: CPT

## 2021-09-30 PROCEDURE — 93296 REM INTERROG EVL PM/IDS: CPT

## 2021-10-01 ENCOUNTER — EMERGENCY (EMERGENCY)
Facility: HOSPITAL | Age: 76
LOS: 1 days | Discharge: HOME | End: 2021-10-01
Attending: EMERGENCY MEDICINE
Payer: MEDICARE

## 2021-10-01 VITALS
HEIGHT: 62 IN | RESPIRATION RATE: 20 BRPM | SYSTOLIC BLOOD PRESSURE: 125 MMHG | WEIGHT: 169.09 LBS | OXYGEN SATURATION: 97 % | HEART RATE: 90 BPM | DIASTOLIC BLOOD PRESSURE: 70 MMHG | TEMPERATURE: 98 F

## 2021-10-01 DIAGNOSIS — Z90.49 ACQUIRED ABSENCE OF OTHER SPECIFIED PARTS OF DIGESTIVE TRACT: Chronic | ICD-10-CM

## 2021-10-01 PROCEDURE — 99285 EMERGENCY DEPT VISIT HI MDM: CPT

## 2021-10-01 NOTE — ED ADULT TRIAGE NOTE - RESPIRATORY RATE (BREATHS/MIN)
Cristopher Hollins was seen 03/26/2019 for an audiological evaluation. Patient complains of hearing loss and occasional tinnitus. Pt reports a Hx of loud noise exposure in the . He says he is tired of people telling him to turn the TV down or to go get hearing aids. [facility] not [facility] because they claim it to not be serivce connected.     Results reveal a bilateral normal-to-moderately severe sensorineural hearing loss.    Speech Reception Thresholds were  15 dBHL for the right ear and 25 dBHL for the left ear.    Word recognition scores were good for the right ear and good for the left ear.   Tympanograms were Type A for the right ear and Type A for the left ear.    Audiogram results were reviewed in detail with patient and all questions were answered. Results will be reviewed by ENT at the completion of this note. Recommend binaural amplification pending medical clearance, hearing test in one year due to noise exposure and hearing protection in loud noise. He will [facility] one more time with the [facility] to get aids but will call here if he is turned down again.            20

## 2021-10-02 VITALS
TEMPERATURE: 98 F | RESPIRATION RATE: 18 BRPM | DIASTOLIC BLOOD PRESSURE: 73 MMHG | SYSTOLIC BLOOD PRESSURE: 124 MMHG | HEART RATE: 87 BPM | OXYGEN SATURATION: 99 %

## 2021-10-02 LAB
ALBUMIN SERPL ELPH-MCNC: 4.1 G/DL — SIGNIFICANT CHANGE UP (ref 3.3–5)
ALP SERPL-CCNC: 111 U/L — SIGNIFICANT CHANGE UP (ref 40–120)
ALT FLD-CCNC: 14 U/L — SIGNIFICANT CHANGE UP (ref 10–45)
ANION GAP SERPL CALC-SCNC: 15 MMOL/L — SIGNIFICANT CHANGE UP (ref 5–17)
APPEARANCE UR: ABNORMAL
AST SERPL-CCNC: 18 U/L — SIGNIFICANT CHANGE UP (ref 10–40)
BACTERIA # UR AUTO: NEGATIVE — SIGNIFICANT CHANGE UP
BASE EXCESS BLDV CALC-SCNC: 4.5 MMOL/L — HIGH (ref -2–2)
BASOPHILS # BLD AUTO: 0.05 K/UL — SIGNIFICANT CHANGE UP (ref 0–0.2)
BASOPHILS NFR BLD AUTO: 0.5 % — SIGNIFICANT CHANGE UP (ref 0–2)
BILIRUB SERPL-MCNC: 0.4 MG/DL — SIGNIFICANT CHANGE UP (ref 0.2–1.2)
BILIRUB UR-MCNC: NEGATIVE — SIGNIFICANT CHANGE UP
BUN SERPL-MCNC: 20 MG/DL — SIGNIFICANT CHANGE UP (ref 7–23)
CA-I SERPL-SCNC: 1.17 MMOL/L — SIGNIFICANT CHANGE UP (ref 1.15–1.33)
CALCIUM SERPL-MCNC: 9.5 MG/DL — SIGNIFICANT CHANGE UP (ref 8.4–10.5)
CHLORIDE BLDV-SCNC: 98 MMOL/L — SIGNIFICANT CHANGE UP (ref 96–108)
CHLORIDE SERPL-SCNC: 98 MMOL/L — SIGNIFICANT CHANGE UP (ref 96–108)
CO2 BLDV-SCNC: 33 MMOL/L — HIGH (ref 22–26)
CO2 SERPL-SCNC: 24 MMOL/L — SIGNIFICANT CHANGE UP (ref 22–31)
COLOR SPEC: YELLOW — SIGNIFICANT CHANGE UP
CREAT SERPL-MCNC: 1.03 MG/DL — SIGNIFICANT CHANGE UP (ref 0.5–1.3)
DIFF PNL FLD: ABNORMAL
EOSINOPHIL # BLD AUTO: 0.16 K/UL — SIGNIFICANT CHANGE UP (ref 0–0.5)
EOSINOPHIL NFR BLD AUTO: 1.5 % — SIGNIFICANT CHANGE UP (ref 0–6)
EPI CELLS # UR: 4 /HPF — SIGNIFICANT CHANGE UP
GAS PNL BLDV: 134 MMOL/L — LOW (ref 136–145)
GAS PNL BLDV: SIGNIFICANT CHANGE UP
GAS PNL BLDV: SIGNIFICANT CHANGE UP
GLUCOSE BLDV-MCNC: 146 MG/DL — HIGH (ref 70–99)
GLUCOSE SERPL-MCNC: 136 MG/DL — HIGH (ref 70–99)
GLUCOSE UR QL: NEGATIVE — SIGNIFICANT CHANGE UP
HCO3 BLDV-SCNC: 31 MMOL/L — HIGH (ref 22–29)
HCT VFR BLD CALC: 37.9 % — SIGNIFICANT CHANGE UP (ref 34.5–45)
HCT VFR BLDA CALC: 41 % — SIGNIFICANT CHANGE UP (ref 34.5–46.5)
HGB BLD CALC-MCNC: 13.6 G/DL — SIGNIFICANT CHANGE UP (ref 11.7–16.1)
HGB BLD-MCNC: 12 G/DL — SIGNIFICANT CHANGE UP (ref 11.5–15.5)
HYALINE CASTS # UR AUTO: 2 /LPF — SIGNIFICANT CHANGE UP (ref 0–2)
IMM GRANULOCYTES NFR BLD AUTO: 0.2 % — SIGNIFICANT CHANGE UP (ref 0–1.5)
KETONES UR-MCNC: NEGATIVE — SIGNIFICANT CHANGE UP
LACTATE BLDV-MCNC: 1.2 MMOL/L — SIGNIFICANT CHANGE UP (ref 0.7–2)
LEUKOCYTE ESTERASE UR-ACNC: ABNORMAL
LIDOCAIN IGE QN: 11 U/L — SIGNIFICANT CHANGE UP (ref 7–60)
LYMPHOCYTES # BLD AUTO: 19.1 % — SIGNIFICANT CHANGE UP (ref 13–44)
LYMPHOCYTES # BLD AUTO: 2.03 K/UL — SIGNIFICANT CHANGE UP (ref 1–3.3)
MCHC RBC-ENTMCNC: 30.8 PG — SIGNIFICANT CHANGE UP (ref 27–34)
MCHC RBC-ENTMCNC: 31.7 GM/DL — LOW (ref 32–36)
MCV RBC AUTO: 97.2 FL — SIGNIFICANT CHANGE UP (ref 80–100)
MONOCYTES # BLD AUTO: 0.78 K/UL — SIGNIFICANT CHANGE UP (ref 0–0.9)
MONOCYTES NFR BLD AUTO: 7.4 % — SIGNIFICANT CHANGE UP (ref 2–14)
NEUTROPHILS # BLD AUTO: 7.57 K/UL — HIGH (ref 1.8–7.4)
NEUTROPHILS NFR BLD AUTO: 71.3 % — SIGNIFICANT CHANGE UP (ref 43–77)
NITRITE UR-MCNC: NEGATIVE — SIGNIFICANT CHANGE UP
NRBC # BLD: 0 /100 WBCS — SIGNIFICANT CHANGE UP (ref 0–0)
PCO2 BLDV: 54 MMHG — HIGH (ref 39–42)
PH BLDV: 7.37 — SIGNIFICANT CHANGE UP (ref 7.32–7.43)
PH UR: 6 — SIGNIFICANT CHANGE UP (ref 5–8)
PLATELET # BLD AUTO: 191 K/UL — SIGNIFICANT CHANGE UP (ref 150–400)
PO2 BLDV: 25 MMHG — SIGNIFICANT CHANGE UP (ref 25–45)
POTASSIUM BLDV-SCNC: 3.9 MMOL/L — SIGNIFICANT CHANGE UP (ref 3.5–5.1)
POTASSIUM SERPL-MCNC: 4.1 MMOL/L — SIGNIFICANT CHANGE UP (ref 3.5–5.3)
POTASSIUM SERPL-SCNC: 4.1 MMOL/L — SIGNIFICANT CHANGE UP (ref 3.5–5.3)
PROT SERPL-MCNC: 7.3 G/DL — SIGNIFICANT CHANGE UP (ref 6–8.3)
PROT UR-MCNC: ABNORMAL
RBC # BLD: 3.9 M/UL — SIGNIFICANT CHANGE UP (ref 3.8–5.2)
RBC # FLD: 14.5 % — SIGNIFICANT CHANGE UP (ref 10.3–14.5)
RBC CASTS # UR COMP ASSIST: 134 /HPF — HIGH (ref 0–4)
SAO2 % BLDV: 38.2 % — LOW (ref 67–88)
SARS-COV-2 RNA SPEC QL NAA+PROBE: SIGNIFICANT CHANGE UP
SODIUM SERPL-SCNC: 137 MMOL/L — SIGNIFICANT CHANGE UP (ref 135–145)
SP GR SPEC: 1.02 — SIGNIFICANT CHANGE UP (ref 1.01–1.02)
TROPONIN T, HIGH SENSITIVITY RESULT: 53 NG/L — HIGH (ref 0–51)
UROBILINOGEN FLD QL: NEGATIVE — SIGNIFICANT CHANGE UP
WBC # BLD: 10.61 K/UL — HIGH (ref 3.8–10.5)
WBC # FLD AUTO: 10.61 K/UL — HIGH (ref 3.8–10.5)
WBC UR QL: 22 /HPF — HIGH (ref 0–5)

## 2021-10-02 PROCEDURE — 71045 X-RAY EXAM CHEST 1 VIEW: CPT

## 2021-10-02 PROCEDURE — 83690 ASSAY OF LIPASE: CPT

## 2021-10-02 PROCEDURE — 94640 AIRWAY INHALATION TREATMENT: CPT

## 2021-10-02 PROCEDURE — 84295 ASSAY OF SERUM SODIUM: CPT

## 2021-10-02 PROCEDURE — 93005 ELECTROCARDIOGRAM TRACING: CPT

## 2021-10-02 PROCEDURE — 82330 ASSAY OF CALCIUM: CPT

## 2021-10-02 PROCEDURE — 87086 URINE CULTURE/COLONY COUNT: CPT

## 2021-10-02 PROCEDURE — 74177 CT ABD & PELVIS W/CONTRAST: CPT | Mod: 26,MA

## 2021-10-02 PROCEDURE — 82803 BLOOD GASES ANY COMBINATION: CPT

## 2021-10-02 PROCEDURE — 71045 X-RAY EXAM CHEST 1 VIEW: CPT | Mod: 26

## 2021-10-02 PROCEDURE — 83880 ASSAY OF NATRIURETIC PEPTIDE: CPT

## 2021-10-02 PROCEDURE — 82947 ASSAY GLUCOSE BLOOD QUANT: CPT

## 2021-10-02 PROCEDURE — 82435 ASSAY OF BLOOD CHLORIDE: CPT

## 2021-10-02 PROCEDURE — 81001 URINALYSIS AUTO W/SCOPE: CPT

## 2021-10-02 PROCEDURE — 87635 SARS-COV-2 COVID-19 AMP PRB: CPT

## 2021-10-02 PROCEDURE — 84132 ASSAY OF SERUM POTASSIUM: CPT

## 2021-10-02 PROCEDURE — 84484 ASSAY OF TROPONIN QUANT: CPT

## 2021-10-02 PROCEDURE — 85018 HEMOGLOBIN: CPT

## 2021-10-02 PROCEDURE — 80053 COMPREHEN METABOLIC PANEL: CPT

## 2021-10-02 PROCEDURE — 74177 CT ABD & PELVIS W/CONTRAST: CPT | Mod: MA

## 2021-10-02 PROCEDURE — 83605 ASSAY OF LACTIC ACID: CPT

## 2021-10-02 PROCEDURE — 85014 HEMATOCRIT: CPT

## 2021-10-02 PROCEDURE — 85025 COMPLETE CBC W/AUTO DIFF WBC: CPT

## 2021-10-02 PROCEDURE — 99284 EMERGENCY DEPT VISIT MOD MDM: CPT | Mod: 25

## 2021-10-02 RX ORDER — IPRATROPIUM/ALBUTEROL SULFATE 18-103MCG
3 AEROSOL WITH ADAPTER (GRAM) INHALATION ONCE
Refills: 0 | Status: COMPLETED | OUTPATIENT
Start: 2021-10-02 | End: 2021-10-02

## 2021-10-02 RX ORDER — CEPHALEXIN 500 MG
500 CAPSULE ORAL ONCE
Refills: 0 | Status: COMPLETED | OUTPATIENT
Start: 2021-10-02 | End: 2021-10-02

## 2021-10-02 RX ORDER — CEPHALEXIN 500 MG
1 CAPSULE ORAL
Qty: 10 | Refills: 0
Start: 2021-10-02 | End: 2021-10-06

## 2021-10-02 RX ADMIN — Medication 500 MILLIGRAM(S): at 05:07

## 2021-10-02 RX ADMIN — Medication 3 MILLILITER(S): at 00:37

## 2021-10-02 NOTE — ED ADULT NURSE NOTE - NSICDXPASTMEDICALHX_GEN_ALL_CORE_FT
PAST MEDICAL HISTORY:  Asthma     Atrial flutter     Cardiac Pacemaker     Cardiomyopathy     Congestive Heart Failure     Diabetes     Diverticulitis     Gout     HTN - Hypertension     Kidney stone     Patient is Gnosticist     Refusal of blood transfusions as patient is Gnosticist     Vertigo

## 2021-10-02 NOTE — ED PROVIDER NOTE - OBJECTIVE STATEMENT
7 y/o female w/ hx NICM, ppm, aflutter on eliquis, asthma c/b respiratory failure requiring trach and peg in past, GI bleed, perforated diverticulitis S/P ex-lap and Lupe's procedure  in July 2019 with abdomen left open, multiple washouts, then abdominal closure with Vicryl mesh (fascia left open) and skin graft over wound, now with partial colectomy and end colostomy, presents for 1 day of intermittent LLQ abdominal pain. Still has output from the ileostomy bag. No N/V. No fever. Pt has a hx of asthma and is presently wheezing, but is in no respiratory distress.

## 2021-10-02 NOTE — ED PROVIDER NOTE - ABDOMINAL EXAM
Abdomen soft, +bowel sounds, extensive scarring over the abdomen, RUQ ileostomy, 2 wounds draining serosanguinous fluids on the LLQ, with tenderness to palpation of the LLQ, no rebound or guarding

## 2021-10-02 NOTE — ED ADULT NURSE NOTE - NSICDXPASTSURGICALHX_GEN_ALL_CORE_FT
PAST SURGICAL HISTORY:  AICD (Automatic Cardioverter/Defibrillator) Present inserted in Aug, 2008. Due for battery change in 1 month. ( [employer]) . Inserted by Dr Dfufy    S/P cholecystectomy     Status post left hemicolectomy

## 2021-10-02 NOTE — ED PROVIDER NOTE - NSICDXPASTMEDICALHX_GEN_ALL_CORE_FT
PAST MEDICAL HISTORY:  Asthma     Atrial flutter     Cardiac Pacemaker     Cardiomyopathy     Congestive Heart Failure     Diabetes     Diverticulitis     Gout     HTN - Hypertension     Kidney stone     Patient is Moravian     Refusal of blood transfusions as patient is Moravian     Vertigo

## 2021-10-02 NOTE — ED PROVIDER NOTE - CARE PROVIDER_API CALL
Clark Alvarado  General Surgery  [address] [place] [address], [place] [address]  Phone: [phone]  Fax: [phone]  Follow Up Time: 1-3 Days

## 2021-10-02 NOTE — ED PROVIDER NOTE - NSFOLLOWUPINSTRUCTIONS_ED_ALL_ED_FT
Take keflex 500mg every 12 hours for 5 days for UTI.     Rest and stay well hydrated.    Call Dr. Alvarado tomorrow morning to schedule appointment for further evaluation in the next week.     Return to the ED for any worsening pain, discharge, bleeding, vomiting, or any new concerning symptoms.

## 2021-10-02 NOTE — ED PROVIDER NOTE - NSFOLLOWUPCLINICS_GEN_ALL_ED_FT
[facility] - Primary Care  Primary Care  [address] [place] [address]  Phone: [phone]  Fax:

## 2021-10-02 NOTE — ED PROVIDER NOTE - NS_ ATTENDINGSCRIBEDETAILS _ED_A_ED_FT
I performed a history and physical exam of the patient and discussed their management with the resident. I reviewed the scribe's note and agree with the documented findings and plan of care.  Umm Lucio MD

## 2021-10-02 NOTE — ED PROVIDER NOTE - CLINICAL SUMMARY MEDICAL DECISION MAKING FREE TEXT BOX
75 y/o female with abdominal pain. Will r/o abdominal process. Concern for diverticulitis.  Will obtain labs including BNP, EKG, CXR, and covid swab. Will give IV fluids, pain medication, and anti-emetics. Will reassess.

## 2021-10-02 NOTE — ED PROVIDER NOTE - NSICDXPASTSURGICALHX_GEN_ALL_CORE_FT
PAST SURGICAL HISTORY:  AICD (Automatic Cardioverter/Defibrillator) Present inserted in Aug, 2008. Due for battery change in 1 month. ( [employer]) . Inserted by Dr Duffy    S/P cholecystectomy     Status post left hemicolectomy

## 2021-10-02 NOTE — ED PROVIDER NOTE - ATTENDING CONTRIBUTION TO CARE
I performed a history and physical exam of the patient and discussed their management with the resident. I reviewed the resident's note and agree with the documented findings and plan of care.  Umm Lucio MD

## 2021-10-02 NOTE — ED PROVIDER NOTE - PROGRESS NOTE DETAILS
Ford, PGY3 - pt was reassessed, states feels improved reports she feels hungry +tolerating PO in the ED. +UTI on labs, no abscess/fistula on CTAP. discussed results with patient, plan for f/u w/ Dr. Alvarado, return precautions. pt verbalized understanding, agrees with plan, all questions answered.

## 2021-10-02 NOTE — ED PROVIDER NOTE - PATIENT PORTAL LINK FT
You can access the FollowMyHealth Patient Portal offered by [facility] by registering at the following website: [email or web address]. By joining [employer]’s FollowMyHealth portal, you will also be able to view your health information using other applications (apps) compatible with our system.

## 2021-10-03 LAB
CULTURE RESULTS: SIGNIFICANT CHANGE UP
SPECIMEN SOURCE: SIGNIFICANT CHANGE UP

## 2021-10-18 NOTE — H&P ADULT - NSHPPHYSICALEXAM_GEN_ALL_CORE
GENERAL: No acute distress, well-developed  HEAD:  Atraumatic, Normocephalic  ENT: EOMI, PERRLA, No JVD, moist mucosa  CHEST/LUNG: Clear to auscultation bilaterally  HEART: Regular rate and rhythm; No murmurs, rubs, or gallops  ABDOMEN: Soft, LLQ tenderness near BASIM drain, fluctuance around site, , lower abdomen wound dressing with purulent drainage, Nondistended  EXTREMITIES:  No clubbing, cyanosis, or edema  PSYCH: Nl behavior, nl affect  NEUROLOGY: AAOx3, non-focal, cranial nerves intact  SKIN: Normal color, No rashes or lesions

## 2021-12-21 NOTE — PATIENT PROFILE ADULT. - FALL HARM RISK TYPE OF ASSESSMENT
Subjective   Kike Rubio is a 83 y.o. male.     Vitals:    12/21/21 1238   BP: 112/62   Pulse: 80   Temp: 97.9 °F (36.6 °C)   SpO2: 98%        Chief Complaint   Patient presents with   • Back Pain     3 MONTH FOLLOW UP  WILL  NEED REFILL  MEDICATIONS    • Hypertension     Complains of a lot of stress and concerned about a tremor        History of Present Illness    3-month follow-up on chronic hip arthrosis, lumbar DDD, and complains of increased stress and concerned about hand tremors.    LOV with me in September for wellness exam and chronic med refills only.  No changes made at that visit, all labs were stable.    Unfortunately, has been under a lot of stress recently.  He has been having a lot more orthopedic issues with his left knee and his left hip --- seeing Ortho/Dr. Guerrero, has a follow-up appointment tomorrow.  Also, his wife has been receiving chemotherapy for the last several months for lymphoma.  Sleep has been fair.  Easily frustrated.    He is concerned about some hand tremors.  Apparently, family members have noticed his hands trembling when he reaches for certain objects.  He does have a sister who was recently diagnosed with Parkinson's.    Otherwise, just needs chronic med refills today.  Due for fasting labs?  Compliant with and tolerates all medications without side effects.    The following portions of the patient's history were reviewed and updated as appropriate: allergies, current medications, past family history, past medical history, past social history, past surgical history and problem list.    Review of Systems   Constitutional: Negative for unexpected weight gain and unexpected weight loss.   Respiratory: Negative for shortness of breath.    Cardiovascular: Negative for chest pain.       Objective   Physical Exam  Vitals and nursing note reviewed.   Constitutional:       Appearance: Normal appearance. He is well-developed. He is obese.   HENT:      Head: Normocephalic and atraumatic.       Nose: Nose normal.   Eyes:      Conjunctiva/sclera: Conjunctivae normal.      Pupils: Pupils are equal, round, and reactive to light.   Neck:      Thyroid: No thyromegaly.   Cardiovascular:      Rate and Rhythm: Normal rate and regular rhythm.      Heart sounds: Normal heart sounds. No murmur heard.      Pulmonary:      Effort: Pulmonary effort is normal.      Breath sounds: Normal breath sounds.   Abdominal:      General: Abdomen is flat. Bowel sounds are normal. There is no distension.      Palpations: Abdomen is soft. There is no hepatomegaly, splenomegaly or mass.      Tenderness: There is no abdominal tenderness. There is no guarding or rebound.      Hernia: No hernia is present.   Musculoskeletal:         General: Normal range of motion.      Cervical back: Normal range of motion and neck supple.      Right lower leg: No edema.      Left lower leg: No edema.   Lymphadenopathy:      Cervical: No cervical adenopathy.   Skin:     General: Skin is warm.   Neurological:      General: No focal deficit present.      Mental Status: He is alert.      Cranial Nerves: Cranial nerves are intact.      Motor: No tremor.      Comments: No cogwheel rigidity  No masked facies  No resting or intention tremor noted.    Immobility due to significant arthroses, wheelchair   Psychiatric:         Mood and Affect: Mood normal.         Behavior: Behavior normal.         Thought Content: Thought content normal.         Judgment: Judgment normal.          LABS/STUDIES  -September 2021 --, HDL 55, GFR 42, normal CBC    Procedures     Assessment/Plan   Diagnoses and all orders for this visit:    1. Hip arthrosis (Primary)  -controlled.  Urine Tox screen/February [place] both reviewed, appropriate.  No change with medications.  Refill [place] as directed below.  Recommend avoiding all NSAIDs, including Mobic given history of CKD.  -     HYDROcodone-acetaminophen (NORCO)  MG per tablet; Take 1 tablet by mouth Every 6 (Six)  Hours As Needed for Moderate Pain .  Dispense: 120 tablet; Refill: 0    2. Anxiety with depression  -uncontrolled.  Increase Cymbalta from 30 to 40 mg daily.  Note, intolerant to 60 mg dose.    3. Intention tremor  -new diagnosis.  Mild.  Reassurance given.  No other signs or symptoms suggestive of parkinsonism.  We will continue to monitor closely.    4. Stage 3a chronic kidney disease (HCC)  -stable.  GFR remains very stable.  Recommend no NSAIDs, including Mobic.  Plan to recheck labs in 3 months    Other orders  -     DULoxetine 40 MG capsule delayed-release particles; Take 1 capsule by mouth Daily.  Dispense: 90 capsule; Refill: 1                 Wore goggles and face mask during entire visit.    Return in about 3 months (around 3/21/2022) for Recheck.      Admission

## 2021-12-29 ENCOUNTER — RESULT CHARGE (OUTPATIENT)
Age: 76
End: 2021-12-29

## 2021-12-30 ENCOUNTER — APPOINTMENT (OUTPATIENT)
Dept: ELECTROPHYSIOLOGY | Facility: CLINIC | Age: 76
End: 2021-12-30
Payer: MEDICARE

## 2021-12-30 ENCOUNTER — NON-APPOINTMENT (OUTPATIENT)
Age: 76
End: 2021-12-30

## 2021-12-30 VITALS
SYSTOLIC BLOOD PRESSURE: 118 MMHG | WEIGHT: 160 LBS | HEIGHT: 62 IN | HEART RATE: 81 BPM | RESPIRATION RATE: 14 BRPM | OXYGEN SATURATION: 96 % | DIASTOLIC BLOOD PRESSURE: 74 MMHG | BODY MASS INDEX: 29.44 KG/M2

## 2021-12-30 PROCEDURE — 93284 PRGRMG EVAL IMPLANTABLE DFB: CPT

## 2021-12-30 PROCEDURE — 93000 ELECTROCARDIOGRAM COMPLETE: CPT | Mod: 59

## 2021-12-30 NOTE — AIRWAY REMOVAL NOTE  ADULT & PEDS - RESPIRATORY RHYTHM/PATTERN
normal mood with appropriate affect
no shortness of breath
rate regular/unlabored/no shortness of breath/depth regular/pattern regular
unlabored

## 2022-01-01 NOTE — PATIENT PROFILE ADULT - LAST ORAL INTAKE
[facility]  Delivery Note    Sj Emery Patient Status:  [place]    2022 MRN [id number]   Location [facility] [address]  NURSERY Attending Nathan Cooney DO   Hosp Day # 0 PCP No primary care provider on file.     Date of Admission:  2022    HPI:  Sj Emery is a(n) Weight: 3300 g (Filed from Delivery Summary) male infant.    Date of Delivery: 2022  Time of Delivery: 9:38 AM  Delivery Type: , Low Transverse    Maternal Information:  Information for the patient's mother:  Rachael Emery [[id number]]   32 year old     Information for the patient's mother:  Rachael Emery [[id number]]          Pertinent Maternal Prenatal Labs:  Information for the patient's mother:  Rachael Emery [[id number]]     Recent Labs   Lab 10/06/22  0000 22  0000   HIV Antigen/Antibody Screen nonreactive nonreactive   RPR Screen nonreactive nonreactive   Rubella Antibody IgG  --  immune        Information for the patient's mother:  Rachael Emery [[id number]]   No results for input(s): CULT, SDES in the last 8765 hours.       Information for the patient's mother:  Rupert Rachael [[id number]]     Recent Labs   Lab 10/06/22  0000 22  0000   HIV Antigen/Antibody Screen nonreactive nonreactive   Hepatitis B Surface Antigen  --  Negative   RPR Screen nonreactive nonreactive   Rubella Antibody IgG  --  immune           Pregnancy/ Complications: Neonatologist asked to attend this delivery by obstetrician due to failure to progress  Maternal obesity  Meds: PNV, asa    Rupture Date: 2022  Rupture Time: 8:18 PM  Rupture Type: Artificial  Fluid Color: Clear  Induction: Misoprostol  Augmentation:    Complications: Arrest Of Descent, Delivered, Current Hospitalization    Apgars:   1 minute: 9                5 minutes:9               Resuscitation: Infant was vigorous after delivery, TCC of 30 seconds, infant was dried, orally suctioned and stimulated, no other resuscitation  was required, transitioned well to extrauterine life.       Physical Exam:  Birth Weight: Weight: 3300 g (Filed from Delivery Summary)    Gen:  Awake, alert, appropriate, in no apparent distress  Skin:   Intact, No rashes, no jaundice  HEENT:  AFOSF, neck supple, no nasal flaring, oral mucous membranes moist  Lungs:  Coarse equal air entry, no retractions, no increased WOB  Chest:  S1, S2 no murmur  Abd:  Soft, nontender, nondistended, no HSM, no masses  Ext:  Peripheral pulses equal bilaterally, no clicks  Neuro: +grasp, equal loren, good tone, no focal deficits  Spine:  No sacral dimples  Hips:  No hip clicks   MSK:  Moves all four extremities appropriately  :  Normal male    Assessment:  39 1/7 wga, AGA  GBS neg    Recommendations:  Routine  nursery care  Parents updated after delivery    Aidee Martinez MD     12-Dec-2019 09:00

## 2022-02-14 NOTE — PHYSICAL THERAPY INITIAL EVALUATION ADULT - GENERAL OBSERVATIONS, REHAB EVAL
Rec'd in No Rec'd in bed, + 2 pigtail drains, + Iv lock, Groggy from IR procedure of 2 pigtail cath, dtr present Yes

## 2022-02-15 NOTE — PROGRESS NOTE ADULT - ATTENDING COMMENTS
Patient seen and examined and agree with above.  No acute events overnight.   Current management includes:  1) sinus tachycardia- will monitor and allow permissive tachycardia unless it is >125 bpm persistently  -denies chest pain or dyspnea    2) Hyperglycemia with DM type 2- continue insulin in TPN  -monitor blood glucose with goal BG < 180    3) systolic heart failure - continue current medications  - appreciate HF team recommendations    4) Chronic atrial fibrillation- continue therapeautic lovenox    No ostomy function yet.  Procrit for 5 days for anemia.   Patient too lethargic for speech and swallow test.   Will continue to monitor mental status and respiratory status. [FreeTextEntry6] : \par Indication:  nasal polyps.  I cannot see past inferior turbinates.\par -Verbal consent was obtained from patient prior to exam. \par - Michel-Synephrine and lidocaine 2% spray applied to nose bilaterally.\par Nasal endoscopy was performed with flexible scope.\par Findings: \par -- Inferior turbinates very edematous and boggy bilateral.  Inferior meatus clear bilateral.\par -- Septum was midline\par -- Nasal polyps filling middle meatus bilateral, with thick yellow gooey secretions\par -- Middle turbinates edematous bilateral\par -- I cannot see any sinus openings because of the polyps\par -- Nasopharynx without mass or exudate\par -- Eustachian orifices were clear bilateral\par \par The patient tolerated the procedure well.\par

## 2022-02-18 ENCOUNTER — EMERGENCY (EMERGENCY)
Facility: HOSPITAL | Age: 77
LOS: 1 days | Discharge: HOME | End: 2022-02-18
Attending: EMERGENCY MEDICINE | Admitting: EMERGENCY MEDICINE
Payer: MEDICARE

## 2022-02-18 VITALS
HEART RATE: 90 BPM | TEMPERATURE: 98 F | DIASTOLIC BLOOD PRESSURE: 77 MMHG | OXYGEN SATURATION: 95 % | RESPIRATION RATE: 18 BRPM | SYSTOLIC BLOOD PRESSURE: 123 MMHG

## 2022-02-18 VITALS
SYSTOLIC BLOOD PRESSURE: 113 MMHG | OXYGEN SATURATION: 96 % | RESPIRATION RATE: 18 BRPM | TEMPERATURE: 98 F | DIASTOLIC BLOOD PRESSURE: 62 MMHG | HEIGHT: 62 IN | HEART RATE: 102 BPM | WEIGHT: 164.02 LBS

## 2022-02-18 DIAGNOSIS — Z90.49 ACQUIRED ABSENCE OF OTHER SPECIFIED PARTS OF DIGESTIVE TRACT: Chronic | ICD-10-CM

## 2022-02-18 LAB
ALBUMIN SERPL ELPH-MCNC: 3.8 G/DL — SIGNIFICANT CHANGE UP (ref 3.3–5)
ALP SERPL-CCNC: 127 U/L — HIGH (ref 40–120)
ALT FLD-CCNC: 14 U/L — SIGNIFICANT CHANGE UP (ref 10–45)
ANION GAP SERPL CALC-SCNC: 17 MMOL/L — SIGNIFICANT CHANGE UP (ref 5–17)
APPEARANCE UR: CLEAR — SIGNIFICANT CHANGE UP
APTT BLD: 24.4 SEC — LOW (ref 27.5–35.5)
AST SERPL-CCNC: 20 U/L — SIGNIFICANT CHANGE UP (ref 10–40)
BACTERIA # UR AUTO: NEGATIVE — SIGNIFICANT CHANGE UP
BASE EXCESS BLDV CALC-SCNC: 1.3 MMOL/L — SIGNIFICANT CHANGE UP (ref -2–2)
BASE EXCESS BLDV CALC-SCNC: 2.8 MMOL/L — HIGH (ref -2–2)
BASOPHILS # BLD AUTO: 0.04 K/UL — SIGNIFICANT CHANGE UP (ref 0–0.2)
BASOPHILS NFR BLD AUTO: 0.4 % — SIGNIFICANT CHANGE UP (ref 0–2)
BILIRUB SERPL-MCNC: 0.4 MG/DL — SIGNIFICANT CHANGE UP (ref 0.2–1.2)
BILIRUB UR-MCNC: NEGATIVE — SIGNIFICANT CHANGE UP
BLD GP AB SCN SERPL QL: NEGATIVE — SIGNIFICANT CHANGE UP
BUN SERPL-MCNC: 20 MG/DL — SIGNIFICANT CHANGE UP (ref 7–23)
CA-I SERPL-SCNC: 1.05 MMOL/L — LOW (ref 1.15–1.33)
CA-I SERPL-SCNC: 1.16 MMOL/L — SIGNIFICANT CHANGE UP (ref 1.15–1.33)
CALCIUM SERPL-MCNC: 9.5 MG/DL — SIGNIFICANT CHANGE UP (ref 8.4–10.5)
CHLORIDE BLDV-SCNC: 100 MMOL/L — SIGNIFICANT CHANGE UP (ref 96–108)
CHLORIDE BLDV-SCNC: 96 MMOL/L — SIGNIFICANT CHANGE UP (ref 96–108)
CHLORIDE SERPL-SCNC: 99 MMOL/L — SIGNIFICANT CHANGE UP (ref 96–108)
CO2 BLDV-SCNC: 28 MMOL/L — HIGH (ref 22–26)
CO2 BLDV-SCNC: 31 MMOL/L — HIGH (ref 22–26)
CO2 SERPL-SCNC: 20 MMOL/L — LOW (ref 22–31)
COLOR SPEC: SIGNIFICANT CHANGE UP
CREAT SERPL-MCNC: 1.02 MG/DL — SIGNIFICANT CHANGE UP (ref 0.5–1.3)
DIFF PNL FLD: ABNORMAL
EOSINOPHIL # BLD AUTO: 0.19 K/UL — SIGNIFICANT CHANGE UP (ref 0–0.5)
EOSINOPHIL NFR BLD AUTO: 2 % — SIGNIFICANT CHANGE UP (ref 0–6)
EPI CELLS # UR: 3 /HPF — SIGNIFICANT CHANGE UP
GAS PNL BLDV: 129 MMOL/L — LOW (ref 136–145)
GAS PNL BLDV: 130 MMOL/L — LOW (ref 136–145)
GAS PNL BLDV: SIGNIFICANT CHANGE UP
GLUCOSE BLDV-MCNC: 110 MG/DL — HIGH (ref 70–99)
GLUCOSE BLDV-MCNC: 115 MG/DL — HIGH (ref 70–99)
GLUCOSE SERPL-MCNC: 115 MG/DL — HIGH (ref 70–99)
GLUCOSE UR QL: NEGATIVE — SIGNIFICANT CHANGE UP
HCO3 BLDV-SCNC: 27 MMOL/L — SIGNIFICANT CHANGE UP (ref 22–29)
HCO3 BLDV-SCNC: 30 MMOL/L — HIGH (ref 22–29)
HCT VFR BLD CALC: 38.4 % — SIGNIFICANT CHANGE UP (ref 34.5–45)
HCT VFR BLDA CALC: 36 % — SIGNIFICANT CHANGE UP (ref 34.5–46.5)
HCT VFR BLDA CALC: 38 % — SIGNIFICANT CHANGE UP (ref 34.5–46.5)
HGB BLD CALC-MCNC: 12.1 G/DL — SIGNIFICANT CHANGE UP (ref 11.7–16.1)
HGB BLD CALC-MCNC: 12.7 G/DL — SIGNIFICANT CHANGE UP (ref 11.7–16.1)
HGB BLD-MCNC: 12.7 G/DL — SIGNIFICANT CHANGE UP (ref 11.5–15.5)
IMM GRANULOCYTES NFR BLD AUTO: 0.3 % — SIGNIFICANT CHANGE UP (ref 0–1.5)
INR BLD: 1.16 RATIO — SIGNIFICANT CHANGE UP (ref 0.88–1.16)
KETONES UR-MCNC: NEGATIVE — SIGNIFICANT CHANGE UP
LACTATE BLDV-MCNC: 1.4 MMOL/L — SIGNIFICANT CHANGE UP (ref 0.7–2)
LACTATE BLDV-MCNC: 3.2 MMOL/L — HIGH (ref 0.7–2)
LEUKOCYTE ESTERASE UR-ACNC: NEGATIVE — SIGNIFICANT CHANGE UP
LIDOCAIN IGE QN: 20 U/L — SIGNIFICANT CHANGE UP (ref 7–60)
LYMPHOCYTES # BLD AUTO: 1.73 K/UL — SIGNIFICANT CHANGE UP (ref 1–3.3)
LYMPHOCYTES # BLD AUTO: 17.8 % — SIGNIFICANT CHANGE UP (ref 13–44)
MAGNESIUM SERPL-MCNC: 1.7 MG/DL — SIGNIFICANT CHANGE UP (ref 1.6–2.6)
MCHC RBC-ENTMCNC: 32.2 PG — SIGNIFICANT CHANGE UP (ref 27–34)
MCHC RBC-ENTMCNC: 33.1 GM/DL — SIGNIFICANT CHANGE UP (ref 32–36)
MCV RBC AUTO: 97.5 FL — SIGNIFICANT CHANGE UP (ref 80–100)
MONOCYTES # BLD AUTO: 0.9 K/UL — SIGNIFICANT CHANGE UP (ref 0–0.9)
MONOCYTES NFR BLD AUTO: 9.2 % — SIGNIFICANT CHANGE UP (ref 2–14)
NEUTROPHILS # BLD AUTO: 6.85 K/UL — SIGNIFICANT CHANGE UP (ref 1.8–7.4)
NEUTROPHILS NFR BLD AUTO: 70.3 % — SIGNIFICANT CHANGE UP (ref 43–77)
NITRITE UR-MCNC: NEGATIVE — SIGNIFICANT CHANGE UP
NRBC # BLD: 0 /100 WBCS — SIGNIFICANT CHANGE UP (ref 0–0)
PCO2 BLDV: 44 MMHG — HIGH (ref 39–42)
PCO2 BLDV: 55 MMHG — HIGH (ref 39–42)
PH BLDV: 7.34 — SIGNIFICANT CHANGE UP (ref 7.32–7.43)
PH BLDV: 7.39 — SIGNIFICANT CHANGE UP (ref 7.32–7.43)
PH UR: 6.5 — SIGNIFICANT CHANGE UP (ref 5–8)
PLATELET # BLD AUTO: 156 K/UL — SIGNIFICANT CHANGE UP (ref 150–400)
PO2 BLDV: 34 MMHG — SIGNIFICANT CHANGE UP (ref 25–45)
PO2 BLDV: 53 MMHG — HIGH (ref 25–45)
POTASSIUM BLDV-SCNC: 4.1 MMOL/L — SIGNIFICANT CHANGE UP (ref 3.5–5.1)
POTASSIUM BLDV-SCNC: 9.8 MMOL/L — CRITICAL HIGH (ref 3.5–5.1)
POTASSIUM SERPL-MCNC: 4.4 MMOL/L — SIGNIFICANT CHANGE UP (ref 3.5–5.3)
POTASSIUM SERPL-SCNC: 4.4 MMOL/L — SIGNIFICANT CHANGE UP (ref 3.5–5.3)
PROT SERPL-MCNC: 7.3 G/DL — SIGNIFICANT CHANGE UP (ref 6–8.3)
PROT UR-MCNC: NEGATIVE — SIGNIFICANT CHANGE UP
PROTHROM AB SERPL-ACNC: 13.8 SEC — HIGH (ref 10.6–13.6)
RBC # BLD: 3.94 M/UL — SIGNIFICANT CHANGE UP (ref 3.8–5.2)
RBC # FLD: 13.7 % — SIGNIFICANT CHANGE UP (ref 10.3–14.5)
RBC CASTS # UR COMP ASSIST: 72 /HPF — HIGH (ref 0–4)
RH IG SCN BLD-IMP: POSITIVE — SIGNIFICANT CHANGE UP
SAO2 % BLDV: 61.8 % — LOW (ref 67–88)
SAO2 % BLDV: 87 % — SIGNIFICANT CHANGE UP (ref 67–88)
SARS-COV-2 RNA SPEC QL NAA+PROBE: SIGNIFICANT CHANGE UP
SODIUM SERPL-SCNC: 136 MMOL/L — SIGNIFICANT CHANGE UP (ref 135–145)
SP GR SPEC: 1.01 — SIGNIFICANT CHANGE UP (ref 1.01–1.02)
UROBILINOGEN FLD QL: NEGATIVE — SIGNIFICANT CHANGE UP
WBC # BLD: 9.74 K/UL — SIGNIFICANT CHANGE UP (ref 3.8–10.5)
WBC # FLD AUTO: 9.74 K/UL — SIGNIFICANT CHANGE UP (ref 3.8–10.5)
WBC UR QL: 3 /HPF — SIGNIFICANT CHANGE UP (ref 0–5)

## 2022-02-18 PROCEDURE — U0003: CPT

## 2022-02-18 PROCEDURE — 74177 CT ABD & PELVIS W/CONTRAST: CPT | Mod: MA

## 2022-02-18 PROCEDURE — 81001 URINALYSIS AUTO W/SCOPE: CPT

## 2022-02-18 PROCEDURE — 71045 X-RAY EXAM CHEST 1 VIEW: CPT

## 2022-02-18 PROCEDURE — U0005: CPT

## 2022-02-18 PROCEDURE — 82947 ASSAY GLUCOSE BLOOD QUANT: CPT

## 2022-02-18 PROCEDURE — 99285 EMERGENCY DEPT VISIT HI MDM: CPT

## 2022-02-18 PROCEDURE — 83605 ASSAY OF LACTIC ACID: CPT

## 2022-02-18 PROCEDURE — 71045 X-RAY EXAM CHEST 1 VIEW: CPT | Mod: 26

## 2022-02-18 PROCEDURE — 85025 COMPLETE CBC W/AUTO DIFF WBC: CPT

## 2022-02-18 PROCEDURE — 83735 ASSAY OF MAGNESIUM: CPT

## 2022-02-18 PROCEDURE — 86901 BLOOD TYPING SEROLOGIC RH(D): CPT

## 2022-02-18 PROCEDURE — 85014 HEMATOCRIT: CPT

## 2022-02-18 PROCEDURE — 84295 ASSAY OF SERUM SODIUM: CPT

## 2022-02-18 PROCEDURE — 74177 CT ABD & PELVIS W/CONTRAST: CPT | Mod: 26,MA

## 2022-02-18 PROCEDURE — 93010 ELECTROCARDIOGRAM REPORT: CPT

## 2022-02-18 PROCEDURE — 83690 ASSAY OF LIPASE: CPT

## 2022-02-18 PROCEDURE — 82330 ASSAY OF CALCIUM: CPT

## 2022-02-18 PROCEDURE — 84132 ASSAY OF SERUM POTASSIUM: CPT

## 2022-02-18 PROCEDURE — 82803 BLOOD GASES ANY COMBINATION: CPT

## 2022-02-18 PROCEDURE — 80053 COMPREHEN METABOLIC PANEL: CPT

## 2022-02-18 PROCEDURE — 85730 THROMBOPLASTIN TIME PARTIAL: CPT

## 2022-02-18 PROCEDURE — 82435 ASSAY OF BLOOD CHLORIDE: CPT

## 2022-02-18 PROCEDURE — 86850 RBC ANTIBODY SCREEN: CPT

## 2022-02-18 PROCEDURE — 85610 PROTHROMBIN TIME: CPT

## 2022-02-18 PROCEDURE — 85018 HEMOGLOBIN: CPT

## 2022-02-18 PROCEDURE — 87086 URINE CULTURE/COLONY COUNT: CPT

## 2022-02-18 PROCEDURE — 96374 THER/PROPH/DIAG INJ IV PUSH: CPT | Mod: XU

## 2022-02-18 PROCEDURE — 93005 ELECTROCARDIOGRAM TRACING: CPT

## 2022-02-18 PROCEDURE — 99285 EMERGENCY DEPT VISIT HI MDM: CPT | Mod: 25

## 2022-02-18 PROCEDURE — 86900 BLOOD TYPING SEROLOGIC ABO: CPT

## 2022-02-18 RX ORDER — ACETAMINOPHEN 500 MG
1000 TABLET ORAL ONCE
Refills: 0 | Status: COMPLETED | OUTPATIENT
Start: 2022-02-18 | End: 2022-02-18

## 2022-02-18 RX ORDER — IPRATROPIUM/ALBUTEROL SULFATE 18-103MCG
3 AEROSOL WITH ADAPTER (GRAM) INHALATION ONCE
Refills: 0 | Status: DISCONTINUED | OUTPATIENT
Start: 2022-02-18 | End: 2022-02-22

## 2022-02-18 RX ADMIN — Medication 1000 MILLIGRAM(S): at 18:04

## 2022-02-18 RX ADMIN — Medication 400 MILLIGRAM(S): at 18:03

## 2022-02-18 NOTE — CONSULT NOTE ADULT - ASSESSMENT
ASSESSMENT: Patient is a 76F with PSH of Lupe's procedure for perforated diverticulitis that was complicated by multiple abscesses and EC fistula. Pt required washout in the OR multiple times and was eventually closed with a bridging vicryl mesh and skin graft. Pt now in the ER with chronic abdominal pain secondary to her hernia that is worse with coughing and improves with tylenol.      PLAN:    - Pt may be discharged from surgical perspective. Would recommend follow up with Dr. Alvarado in the office.   - Discussed with Dr. Chambers, attending

## 2022-02-18 NOTE — ED PROVIDER NOTE - ATTENDING CONTRIBUTION TO CARE
Dr. Brooks (Attending Physician)  I performed a history and physical exam of the patient and discussed their management with the resident. I reviewed the resident's note and agree with the documented findings and plan of care. My medical decision making and observations are found above.

## 2022-02-18 NOTE — ED PROVIDER NOTE - PHYSICAL EXAMINATION
PHYSICAL EXAM:  GENERAL: Awake, alert. Uncomfortable appearing hunched over in bed   HEENT: no conjunctivitis or scleral icterus  NECK: no mass or jvd  CARDIAC: tachycardic, regular rhythm.   PULM: coarse upper airway sounds with dry cough. speaking in full sentences no respiratory distress  ABDOMEN: extensive abdominal scarring with RLQ ostomy bag, dark output in bag. soft abdomen with + upper abdominal tenderness, greatest on the right. No guarding/rebound.   EXTREMITIES: no lower extremity edema.   NEURO: No focal deficits, no acute change from baseline exam  SKIN: No rash or edema PHYSICAL EXAM:  GENERAL: Awake, alert. Uncomfortable appearing hunched over in bed   HEENT: no conjunctivitis or scleral icterus  NECK: no mass or jvd  CARDIAC: tachycardic, regular rhythm.   PULM: coarse upper airway sounds with dry cough. speaking in full sentences no respiratory distress  ABDOMEN: extensive lower abdominal scarring with RLQ ostomy bag, dark output in bag. soft abdomen with + upper abdominal tenderness, greatest on the right. No guarding/rebound.   EXTREMITIES: no lower extremity edema.   NEURO: No focal deficits, no acute change from baseline exam  SKIN: No rash or edema PHYSICAL EXAM:  GENERAL: Awake, alert. Uncomfortable appearing hunched over in bed   HEENT: no conjunctivitis or scleral icterus  NECK: no mass or jvd  CARDIAC: tachycardic, regular rhythm.   PULM: coarse upper airway sounds with dry cough. speaking in full sentences no respiratory distress  ABDOMEN: extensive lower abdominal scarring with RLQ ostomy bag, dark output in bag. soft abdomen with + upper abdominal tenderness, greatest on the right. No guarding/rebound. Chronic appearing wounds draining over lower abd.  EXTREMITIES: no lower extremity edema.   NEURO: No focal deficits, no acute change from baseline exam  SKIN: No rash or edema

## 2022-02-18 NOTE — ED ADULT NURSE NOTE - OBJECTIVE STATEMENT
Pt presents to ED c/o Bilateral lower quadrant pain. Pt denies any SOb, Fever , chills, N/V/D. hx of diverticulitis have ileostomy.

## 2022-02-18 NOTE — ED PROVIDER NOTE - NSICDXPASTMEDICALHX_GEN_ALL_CORE_FT
PAST MEDICAL HISTORY:  Asthma     Atrial flutter     Cardiac Pacemaker     Cardiomyopathy     Congestive Heart Failure     Diabetes     Diverticulitis     Gout     HTN - Hypertension     Kidney stone     Patient is Hoahaoism     Refusal of blood transfusions as patient is Hoahaoism     Vertigo

## 2022-02-18 NOTE — CONSULT NOTE ADULT - SUBJECTIVE AND OBJECTIVE BOX
GENERAL SURGERY CONSULT NOTE  --------------------------------------------------------------------------------------------    HPI: 76F with PMH DM2, HTN, COPD, Aflutter on Eliquis, HF with AICD and PSH of Lupe procedure for perforated diverticulitis in 2019 with multiple washouts in the OR (c/b EC fistula) and eventual closure with vicryl bridging mesh and skin graft who presented to the ER with 1 day of diffuse pressure in her abdomen. Pain is worse with coughing. She gets this pain multiple times a week when she is coughing and usually Tylenol helps. Pt knows that the pain is related to her hernia. Today, she did not take Tylenol and the pain was slightly worse so she came to the ER. Pt reports that her abdominal pain immediately improved with Tylenol in the ER. Tolerating diet. +gas/stool in ostomy. EC fistula low output as per pt. No nausea, vomiting, fever, chills, SOB, chest pain.     ROS: 10-system review is otherwise negative except HPI above.      PAST MEDICAL & SURGICAL HISTORY:  Asthma    Congestive Heart Failure    Diabetes    Gout    HTN - Hypertension    Cardiac Pacemaker    Kidney stone    Cardiomyopathy    Diverticulitis    Atrial flutter    Vertigo    Patient is Jehovah&#x27;s Witness    Refusal of blood transfusions as patient is Jehovah&#x27;s Witness    AICD (Automatic Cardioverter/Defibrillator) Present  inserted in Aug, 2008. Due for battery change in 1 month. ( [email or web address]) . Inserted by Dr Duffy    S/P cholecystectomy    Status post left hemicolectomy    FAMILY HISTORY:  Family history of brain tumor (Father)    SOCIAL HISTORY: non-smoker. no drug use. no alcohol.     ALLERGIES: Coreg (Other)  digoxin (Other; Short breath (Mild to Mod))  metoprolol (Other)  penicillins (Hives)  Solu-Medrol (Other (Mild to Mod))    CURRENT MEDICATIONS  MEDICATIONS (STANDING): albuterol/ipratropium for Nebulization. 3 milliLiter(s) Nebulizer once  albuterol/ipratropium for Nebulization. 3 milliLiter(s) Nebulizer once    MEDICATIONS (PRN):  --------------------------------------------------------------------------------------------    Vitals:   T(C): 36.8 (22 @ 13:35), Max: 36.8 (22 @ 13:35)  HR: 102 (22 @ 13:35) (102 - 102)  BP: 113/62 (22 @ 13:35) (113/62 - 113/62)  RR: 18 (22 @ 13:35) (18 - 18)  SpO2: 96% (22 @ 13:35) (96% - 96%)  CAPILLARY BLOOD GLUCOSE    Height (cm): 157.5 ( @ 13:35)  Weight (kg): 74.4 ( @ 13:35)  BMI (kg/m2): 30 ( @ 13:35)  BSA (m2): 1.76 ( @ 13:35)    PHYSICAL EXAM:   General: NAD, Lying in bed comfortably  Neuro: A+Ox3  HEENT: NC/AT, EOMI  Neck: Soft, supple  Cardio: regular rate and rhythm  Resp: breathing comfortably  GI/Abd: soft, nondistended, nontender. well healed abdominal wound/skin graft. EC fistula pink with minimal output. ostomy pink/viable.   Vascular: All 4 extremities warm.  Skin: Intact, no breakdown  Musculoskeletal: All 4 extremities moving spontaneously, no limitations  --------------------------------------------------------------------------------------------    LABS  CBC ( 15:36)                              12.7                           9.74    )----------------(  156        70.3  % Neutrophils, 17.8  % Lymphocytes, ANC: 6.85                                38.4      BMP ( 15:36)             136     |  99      |  20    		Ca++ --      Ca 9.5                ---------------------------------( 115<H>		Mg 1.7                4.4     |  20<L>   |  1.02  			Ph --        LFTs ( @ 15:36)      TPro 7.3 / Alb 3.8 / TBili 0.4 / DBili -- / AST 20 / ALT 14 / AlkPhos 127<H>    Coags ( @ 15:36)  aPTT 24.4<L> / INR 1.16 / PT 13.8<H>        VBG ( 15:33)     7.34 / 55<H> / 34 / 30<H> / 2.8<H> / 61.8<L>%     Lactate: 3.2<H>    --------------------------------------------------------------------------------------------    MICROBIOLOGY  Urinalysis ( @ 15:37):     Color: Light Yellow / Appearance: Clear / S.012 / pH: 6.5 / Gluc: Negative / Ketones: Negative / Bili: Negative / Urobili: Negative / Protein :Negative / Nitrites: Negative / Leuk.Est: Negative / RBC: 72<H> / WBC: 3 / Sq Epi:  / Non Sq Epi: 3 / Bacteria Negative         --------------------------------------------------------------------------------------------    IMAGING  < from: CT Abdomen and Pelvis w/ IV Cont (22 @ 17:22) >  IMPRESSION:  Large ventral hernia, with nonobstructed bowel.    < end of copied text >

## 2022-02-18 NOTE — ED PROVIDER NOTE - NSICDXPASTSURGICALHX_GEN_ALL_CORE_FT
PAST SURGICAL HISTORY:  AICD (Automatic Cardioverter/Defibrillator) Present inserted in Aug, 2008. Due for battery change in 1 month. ( [employer]) . Inserted by Dr Duffy    S/P cholecystectomy     Status post left hemicolectomy

## 2022-02-18 NOTE — ED PROVIDER NOTE - NSFOLLOWUPINSTRUCTIONS_ED_ALL_ED_FT
You were evaluated in the emergency department for abdominal pain. The results of your bloodwork and CT scan are attached.     Please follow up with your primary care physician.     Surgery would also like to see in you in their office in 1-2 weeks with Dr. Alvarado. Her information is below.     Please return to the emergency department with any new or worsening symptoms, including:  -Severe pain  -Burning with urination or blood in the urine  -Dark or bloody stool  -High fevers  -Vomiting  -Chest pain  -Shortness of breath      Acute Abdominal Pain    WHAT YOU NEED TO KNOW:  The cause of your abdominal pain may not be found. If a cause is found, treatment will depend on what the cause is.    DISCHARGE INSTRUCTIONS:    Seek care immediately if:  You vomit blood or cannot stop vomiting.  You have blood in your bowel movement or it looks like tar.  You have bleeding from your rectum.  Your abdomen is larger than usual, more painful, and hard.  You have severe pain in your abdomen.  You stop passing gas and having bowel movements.  You feel weak, dizzy, or faint.  Contact your healthcare provider if:  You have a fever.  You have new signs and symptoms.  Your symptoms do not get better with treatment.  You have questions or concerns about your condition or care.  Medicines may be given to decrease pain, treat an infection, and manage your symptoms. Take your medicine as directed. Call your healthcare provider if you think your medicine is not helping or if you have side effects. Tell him if you are allergic to any medicine. Keep a list of the medicines, vitamins, and herbs you take. Include the amounts, and when and why you take them. Bring the list or the pill bottles to follow-up visits. Carry your medicine list with you in case of an emergency.    Manage your symptoms:    Apply heat on your abdomen for 20 to 30 minutes every 2 hours for as many days as directed. Heat helps decrease pain and muscle spasms.  Manage your stress. Stress may cause abdominal pain. Your healthcare provider may recommend relaxation techniques and deep breathing exercises to help decrease your stress. Your healthcare provider may recommend you talk to someone about your stress or anxiety, such as a counselor or a trusted friend. Get plenty of sleep and exercise regularly.  Limit or do not drink alcohol. Alcohol can make your abdominal pain worse. Ask your healthcare provider if it is safe for you to drink alcohol. Also ask how much is safe for you to drink.  Do not smoke. Nicotine and other chemicals in cigarettes can damage your esophagus and stomach. Ask your healthcare provider for information if you currently smoke and need help to quit. E-cigarettes or smokeless tobacco still contain nicotine. Talk to your healthcare provider before you use these products.  Make changes to the food you eat as directed: Do not eat foods that cause abdominal pain or other symptoms. Eat small meals more often.  Eat more high-fiber foods if you are constipated. High-fiber foods include fruits, vegetables, whole-grain foods, and legumes.  Do not eat foods that cause gas if you have bloating. Examples include broccoli, cabbage, and cauliflower. Do not drink soda or carbonated drinks, because these may also cause gas.  Do not eat foods or drinks that contain sorbitol or fructose if you have diarrhea and bloating. Some examples are fruit juices, candy, jelly, and sugar-free gum.  Do not eat high-fat foods, such as fried foods, cheeseburgers, hot dogs, and desserts.  Limit or do not drink caffeine. Caffeine may make symptoms, such as heart burn or nausea, worse.  Drink plenty of liquids to prevent dehydration from diarrhea or vomiting. Ask your healthcare provider how much liquid to drink each day and which liquids are best for you.  Follow up with your healthcare provider as directed: Write down your questions so you remember to ask them during your visits.

## 2022-02-18 NOTE — ED PROVIDER NOTE - PATIENT PORTAL LINK FT
You can access the FollowMyHealth Patient Portal offered by [facility] by registering at the following website: [email or web address]. By joining [employer]’s FollowMyHealth portal, you will also be able to view your health information using other applications (apps) compatible with our system.

## 2022-02-18 NOTE — ED PROVIDER NOTE - PROGRESS NOTE DETAILS
Attending MD Nolan.  PT signed out to me in stable condition pending CTAP for intermittent abd'l pain, 77 yo fem hx of perf divertic, colostomy, non-healing abd'l wound, chronic drainage, intermittent episodes abd'l pain, worse with coughing. Maria Del Carmen Banda PGY-2: Ok for DC from surgical standpoint. To f/u with Dr. albarran. Repeat VBG to DC. Maria Del Carmen Banda PGY-2: Lactate resolved on repeat VBG. To dc. Attending MD Nolan.  Pt's pain has resolved, she has eaten.  She has been seen by surgery and cleared for outpt f/u.  Pt's lactate has cleared.  Stable for discharge home.  Pt comfortable with discharge plan and ambulatory without assistance.

## 2022-02-18 NOTE — ED PROVIDER NOTE - OBJECTIVE STATEMENT
Ok for monday   77 y/o female w/ hx NICM, ppm, aflutter on eliquis, asthma c/b respiratory failure requiring trach and peg in past, GI bleed, perforated diverticulitis S/P ex-lap and Lupe's procedure  in July 2019 with abdomen left open, multiple washouts, then abdominal closure with Vicryl mesh (fascia left open) and skin graft over wound, now with partial colectomy and end colostomy, presents for severe abdominal since this morning. Pt states she woke up feeling at her baseline level of discomfort, an hour and a half after eating breakfast she states she began to feel sudden onset of RUQ abdominal pain that she rates as a 10/10 waxing and waning sharpness that is increased with movement. She states that she feels similar pain occasionally in the LLQ today.  It is not improved with changes in position, and she has not taken any medications PTA. She denies any urinary frequency/dysuria,  n/v, fever, or chills. She denies any increase in the quanitty of her ostomy output or in the quality. She denies chest pain, but endorses chronic cough with mildly increased SOB. She denies sick contacts, has been vaccinated. No recent abx use. 75 y/o female w/ hx NICM, ppm, aflutter on eliquis, asthma c/b respiratory failure requiring trach and peg in past, GI bleed, perforated diverticulitis S/P ex-lap and Lupe's procedure  in July 2019 with abdomen left open, multiple washouts, then abdominal closure with Vicryl mesh (fascia left open) and skin graft over wound, now with partial colectomy and end colostomy, presents for severe abdominal since this morning. Pt states she woke up feeling at her baseline level of discomfort, an hour and a half after eating breakfast she states she began to feel sudden onset of RUQ abdominal pain that she rates as a 10/10 waxing and waning sharpness that is increased with movement. She states that she feels similar pain occasionally in the LLQ today.  It is not improved with changes in position, and she has not taken any medications PTA. She denies any urinary frequency/dysuria,  n/v, fever, or chills. She denies any increase in the quantity of her ostomy output or in the quality. She denies chest pain, but endorses chronic cough with mildly increased SOB. She denies sick contacts, has been vaccinated. No recent abx use.

## 2022-02-18 NOTE — ED PROVIDER NOTE - CARE PROVIDER_API CALL
Clark Alvarado  General Surgery  [address] [place] [address], [address]  [place], [place] [id number]  Phone: [phone]  Fax: [phone]  Follow Up Time: 4-6 Days

## 2022-02-18 NOTE — ED PROVIDER NOTE - CLINICAL SUMMARY MEDICAL DECISION MAKING FREE TEXT BOX
76 y.o female with hx of multiple abdominal surgeries, ostomy, and diverticulitis presents for sudden onset severe upper abdominal pain this morning. No n/v or fever. 76 y.o female with hx of multiple abdominal surgeries, ostomy, and diverticulitis presents for sudden onset severe upper abdominal pain this morning. No n/v or fever. Diffuse tenderness on exam greatest in RUQ with coarse dry cough noted as well. Plan for abdominal labs, CT abdomen/pelvis given extensive surgical hx, EKG and urine. CT chest r.o pneumonia given recent worsening cough. 76 y.o female with hx of multiple abdominal surgeries, ostomy, and diverticulitis presents for sudden onset severe upper abdominal pain this morning. No n/v or fever. Diffuse tenderness on exam greatest in RUQ with coarse dry cough noted as well. Plan for abdominal labs, CT abdomen/pelvis given extensive surgical hx, EKG and urine. CXR r.o pneumonia given recent worsening cough. 76 y.o female with hx of multiple abdominal surgeries, ostomy, and diverticulitis presents for sudden onset severe upper abdominal pain this morning. No n/v or fever. Diffuse tenderness on exam greatest in RUQ with coarse dry cough noted as well. Plan for abdominal labs, CT abdomen/pelvis given extensive surgical hx, EKG and urine. CXR r.o pneumonia given recent worsening cough.    Dr. Brooks (Attending Physician)

## 2022-02-18 NOTE — ED PROVIDER NOTE - NS ED ROS FT
CONSTITUTIONAL: No weakness, fatigue, fevers or chills, significant weight loss or gain  HEENT: No rhinorrhea or congestion; No neck pain or stiffness  RESPIRATORY: +cough wheezing sob  CARDIOVASCULAR: No chest pain   GASTROINTESTINAL: + abdominal pain, no n/v.   GENITOURINARY: No dysuria, frequency or hematuria  MUSCULOSKELETAL: No arthralgia or myalgia  NEUROLOGIC: No headache, numbness or weakness  ENDOCRINOLOGIC: No polyuria or polydipsia  HEMATOLOGIC: No bruising, bleeding, pallor or jaundice  SKIN: No rashes CONSTITUTIONAL: No weakness, fatigue, fevers or chills, significant weight loss or gain  HEENT: No rhinorrhea or congestion; No neck pain or stiffness  RESPIRATORY: +cough wheezing +sob  CARDIOVASCULAR: No chest pain   GASTROINTESTINAL: + abdominal pain, no n/v.   GENITOURINARY: No dysuria, frequency or hematuria  MUSCULOSKELETAL: No arthralgia or myalgia  NEUROLOGIC: No headache, numbness or weakness  ENDOCRINOLOGIC: No polyuria or polydipsia  HEMATOLOGIC: No bruising, bleeding, pallor or jaundice  SKIN: No rashes

## 2022-02-19 LAB
CULTURE RESULTS: SIGNIFICANT CHANGE UP
SPECIMEN SOURCE: SIGNIFICANT CHANGE UP

## 2022-02-21 NOTE — PHYSICAL THERAPY INITIAL EVALUATION ADULT - ASSISTIVE DEVICE FOR TRANSFER: BED/CHAIR, REHAB EVAL
Pt would like the nurse to know that her legs still hurt from last week, wanted to know if she still has a blood clot    rolling walker

## 2022-03-03 NOTE — PATIENT PROFILE ADULT - BRADEN SENSORY
Triage,   Judd PT, from [facility] called to request the following verbal orders from JW:    PT effective on 3/2/22 2x a week for 5 weeks, 1x a week for 3 weeks working on lower extremity strengthening, range of motion, transfer safety, and ambulation.     Thanks!  Brissa RAMOS       (2) very limited

## 2022-03-11 ENCOUNTER — APPOINTMENT (OUTPATIENT)
Dept: SURGERY | Facility: CLINIC | Age: 77
End: 2022-03-11
Payer: MEDICARE

## 2022-03-11 VITALS
HEIGHT: 62 IN | SYSTOLIC BLOOD PRESSURE: 105 MMHG | TEMPERATURE: 97.8 F | HEART RATE: 99 BPM | BODY MASS INDEX: 29.44 KG/M2 | WEIGHT: 160 LBS | OXYGEN SATURATION: 97 % | DIASTOLIC BLOOD PRESSURE: 68 MMHG | RESPIRATION RATE: 18 BRPM

## 2022-03-11 PROCEDURE — 99213 OFFICE O/P EST LOW 20 MIN: CPT

## 2022-03-11 NOTE — PLAN
[FreeTextEntry1] : [] Tylenol as needed for abdominal discomfort\par [] Continue current ostomy care\par [] Follow-up 6 months

## 2022-03-11 NOTE — PHYSICAL EXAM
[de-identified] : Well-appearing, appears stated age, no acute distress, uses wheelchair [de-identified] : Normocephalic, sclerae anicteric, mucous membranes moist [de-identified] : Normal respirations [de-identified] : Soft, nontender, nondistended.  Right upper quadrant colostomy appears normal with stool in bag.  Midline ventral incisional hernia is soft, reducible, with no overlying skin changes.  2 small fistula sites in lower abdomen with granulation tissue at their opening sites.  Skin around them is clean and dry and non-erythematous.

## 2022-03-11 NOTE — HISTORY OF PRESENT ILLNESS
[de-identified] : Pretty Dolan is a 74 y/o female S/P ex-lap and Lupe's procedure for perforated diverticulitis in July 2019 with abdomen left open, multiple washouts, then abdominal closure with Vicryl mesh (fascia left open) and skin graft over wound. She was seen at [facility] ED on 02/18/22 with abdominal pain, which resolved after Tylenol administration.\par \par She presents today in her usual state of health.  She reports no pain at present.  She has some abdominal discomfort when she coughs, but otherwise has been doing well.  Ostomy has been functioning normally.  Tolerating normal diet.  Has been ambulating as usual.  Her small pinpoint fistulous have necessitated stable, twice a day dressing changes.

## 2022-03-11 NOTE — ASSESSMENT
[FreeTextEntry1] : 76F s/p ex-lap and Lupe's procedure for perforated diverticulitis in July 2019 with abdomen left open, multiple washouts, then abdominal closure with vicryl mesh (fascia left open) and skin graft over wound.  She subsequently developed a rectal stump leak, with persistent low output colocutaneous fistula x2 which are well controlled.  No evidence of acute cellulitis or infection.\par \par \par \par

## 2022-03-25 NOTE — ED ADULT NURSE NOTE - NSSUSCREENINGQ2_ED_ALL_ED
Patient called asking to have an appointment scheduled for her pre-op. Please check with  and find out when she could be seen No

## 2022-03-30 ENCOUNTER — APPOINTMENT (OUTPATIENT)
Dept: ELECTROPHYSIOLOGY | Facility: CLINIC | Age: 77
End: 2022-03-30

## 2022-03-31 ENCOUNTER — NON-APPOINTMENT (OUTPATIENT)
Age: 77
End: 2022-03-31

## 2022-03-31 ENCOUNTER — APPOINTMENT (OUTPATIENT)
Dept: ELECTROPHYSIOLOGY | Facility: CLINIC | Age: 77
End: 2022-03-31
Payer: MEDICARE

## 2022-03-31 PROCEDURE — 93296 REM INTERROG EVL PM/IDS: CPT

## 2022-03-31 PROCEDURE — 93295 DEV INTERROG REMOTE 1/2/MLT: CPT

## 2022-03-31 NOTE — PROCEDURE NOTE - NSSITEPREP_SKIN_A_CORE
Gabapentin 100 mg - nerve pain medication  Start 1 tablet nightly, may increase by 1 tablet each night up to 3 tablets nightly    Robaxin - muscle relaxer, can take up to 3 times/day    Hold on taking hydroxyzine or ativan while on gabapentin and robaxin      
chlorhexidine

## 2022-06-09 NOTE — PROGRESS NOTE ADULT - ASSESSMENT
75 yo female with history of DM2, HTN, COPD, Aflutter on Eliquis and HF with AICD ([employer], interrogated on 09/02/2019), and perforated diverticulitis requiring Lupe in July 2019 which complicated by enterocutaneous fistula and multiple abscesses requiring IR drainage (most recently in Dec 2019 on the left anterior abdominal wall) came in with abdominal pain for 1 day.     U/A with > 124 WBC (culture not sent but patient asymptomatic)  CT A/P (1/8) with A new 7.3 x 1.8 cm left lateral abdominal collection.  Underwent IR guided drainage of collection on 1/9  Abscess culture MRSA, Enterococcus, E coli    Anticipate giving 7 day course from IR drain insertion.   Anticipate giving PO on discharge: Keflex/Flagyl/Doxycycline    Supratherapeutic vanc level today. Would decrease to 1g IV Q24H. Not given dose this AM. Can resume in PM. Would check renal function with labwork tomorrow.     Overall, MRSA infection, Abdominal Wall Collection, Leukocytosis, Positive Culture, Therapeutic drug monitoring    --Recommend contact isolation  --Recommend Vancomycin 1g IV Q24H. Trough prior to third dose  (Continue to monitor renal function and vancomycin troughs to avoid nephrotoxicity / otoxicity secondary to vancomycin)   --Continue Ertapenem 1g IV Q24H  --Follow up on prelim abdominal wall collection cultures  --Continue to follow CBC with diff  --Continue to follow temperature curve  --Follow up on preliminary blood cultures    I will continue to follow. Please feel free to contact me with any further questions.    Rex Goode M.D.  [facility] Division of Infectious Disease  8AM-5PM: Pager Number [phone]  After Hours (or if no response): Please contact the Infectious Diseases Office at [phone] 75 yo female with history of DM2, HTN, COPD, Aflutter on Eliquis and HF with AICD ([employer], interrogated on 09/02/2019), and perforated diverticulitis requiring Lupe in July 2019 which complicated by enterocutaneous fistula and multiple abscesses requiring IR drainage (most recently in Dec 2019 on the left anterior abdominal wall) came in with abdominal pain for 1 day.     U/A with > 124 WBC (culture not sent but patient asymptomatic)  CT A/P (1/8) with A new 7.3 x 1.8 cm left lateral abdominal collection.  Underwent IR guided drainage of collection on 1/9  Abscess culture MRSA, Enterococcus, E coli    Anticipate giving 7 day course from IR drain insertion.   Anticipate giving PO on discharge: Keflex/Flagyl/Doxycycline    Supratherapeutic vanc level today. Would decrease to 1g IV Q24H. Not given dose this AM. Can resume in PM. Would check renal function with labwork tomorrow.     Overall, MRSA infection, Abdominal Wall Collection, Leukocytosis, Positive Culture, Therapeutic drug monitoring    --Recommend repeat CT A/P (with PO and Rectal Contrast - to see if there is persistent fistula - patient had colonoscopy intervention with reported resolution/sealing of fistula)  --Recommend contact isolation  --Recommend Vancomycin 1g IV Q24H. Trough prior to third dose  (Continue to monitor renal function and vancomycin troughs to avoid nephrotoxicity / otoxicity secondary to vancomycin)   --Continue Ertapenem 1g IV Q24H  --Follow up on prelim abdominal wall collection cultures  --Continue to follow CBC with diff  --Continue to follow temperature curve  --Follow up on preliminary blood cultures    I will continue to follow. Please feel free to contact me with any further questions.    Rex Goode M.D.  [facility] Division of Infectious Disease  8AM-5PM: Pager Number [phone]  After Hours (or if no response): Please contact the Infectious Diseases Office at [phone] 73 yo female with history of DM2, HTN, COPD, Aflutter on Eliquis and HF with AICD ([employer], interrogated on 09/02/2019), and perforated diverticulitis requiring Lupe in July 2019 which complicated by enterocutaneous fistula and multiple abscesses requiring IR drainage (most recently in Dec 2019 on the left anterior abdominal wall) came in with abdominal pain for 1 day.     U/A with > 124 WBC (culture not sent but patient asymptomatic)  CT A/P (1/8) with A new 7.3 x 1.8 cm left lateral abdominal collection.  Underwent IR guided drainage of collection on 1/9  Abscess culture MRSA, Enterococcus, E coli    Anticipate giving 7 day course from IR drain insertion.   Anticipate giving PO on discharge: Keflex/Flagyl/Doxycycline    Supratherapeutic vanc level today. Would decrease to 1g IV Q24H. Not given dose this AM. Can resume in PM. Would check renal function with labwork tomorrow.     Overall, MRSA infection, Abdominal Wall Collection, Leukocytosis, Therapeutic drug monitoring    --Recommend repeat CT A/P (with PO and Rectal Contrast - to see if there is persistent fistula - patient had colonoscopy intervention with reported resolution/sealing of fistula)  --Recommend contact isolation  --Recommend Vancomycin 1g IV Q24H. Trough prior to third dose  (Continue to monitor renal function and vancomycin troughs to avoid nephrotoxicity / otoxicity secondary to vancomycin)   --Continue Ertapenem 1g IV Q24H  --Follow up on prelim abdominal wall collection cultures  --Continue to follow CBC with diff  --Continue to follow temperature curve  --Follow up on preliminary blood cultures    I will continue to follow. Please feel free to contact me with any further questions.    Rex Goode M.D.  [facility] Division of Infectious Disease  8AM-5PM: Pager Number [phone]  After Hours (or if no response): Please contact the Infectious Diseases Office at [phone] Detail Level: Detailed

## 2022-06-30 ENCOUNTER — NON-APPOINTMENT (OUTPATIENT)
Age: 77
End: 2022-06-30

## 2022-06-30 ENCOUNTER — APPOINTMENT (OUTPATIENT)
Dept: ELECTROPHYSIOLOGY | Facility: CLINIC | Age: 77
End: 2022-06-30

## 2022-06-30 PROCEDURE — 93295 DEV INTERROG REMOTE 1/2/MLT: CPT

## 2022-06-30 PROCEDURE — 93296 REM INTERROG EVL PM/IDS: CPT

## 2022-07-12 NOTE — ED PROVIDER NOTE - CPE EDP SKIN NORM
In Patient Consult      Patient Name: Robe Bryant  : 1947   MRN: [id number]   Admission Date: 2022  1:37 PM     Admission Diagnosis: CHF exacerbation    Care Team: Patient Care Team:  Raj Sullivan MD as PCP - General (Internal Medicine)  Arelis Tucker MD as Consulting Physician (Cardiology)  Osiel Heaton MD as Consulting Physician (Urology)  Chace Vasquez MD as Consulting Physician (Nephrology)  Blayne Whitman MD as Consulting Physician (Ophthalmology)  Jose Hargrove MD as Consulting Physician (Pulmonary Disease)  John Solorzano MD as Consulting Physician (Urology)    History of Present Illness: Robe Bryant is a 74 y.o. male who Urology was consulted to see for urinary retention.  Patient has a history of BPH s/p TURP.     Patient presented yesterday to the emergency department due to chest pain and shortness of breath, was found to have a CHF exacerbation.  Preceding this ER visit, he denies any dysuria, change in urinary symptoms, hematuria.  He states that since his cystoscopy with Dr. Guerra in February, he has been urinating well.  He is unsure if he ended up stopping terazosin per Dr. Guerra's recommendations.    Subjective      Review of System: Review of Systems   Respiratory: Positive for shortness of breath and wheezing.    Cardiovascular: Positive for chest pain.   All other systems reviewed and are negative.     I have reviewed the ROS documented in H&P and agree.    Past Medical History:   Past Medical History:   Diagnosis Date   • Anxiety and depression    • Arthritis    • Backache     20 years   • Benign prostatic hyperplasia    • Bladder trauma    • Cervicalgia    • Chronic kidney disease     Cr up to 2.1   • Coronary artery disease    • Diabetes mellitus (HCC)     15 years--   • Emphysema of lung (HCC)    • Erectile dysfunction    • Eye exam, routine    • FH: colonic polyps    • Gout     for 10 years--   • History of echocardiogram  09/15/2014   • History of tobacco use     with cessation x7 years   • Hyperlipidemia    • Hypertension    • Migraine    • Myocardial infarction (HCC)     h/o coronary artery stenting--   • Prostate cancer (HCC)    • Restless leg syndrome     20 years   • Sleep apnea     12 years; uses a Bi-Pap   • Stroke (HCC)    • Syncope 2017   • Warfarin anticoagulation 2016       Past Surgical History:   Past Surgical History:   Procedure Laterality Date   • BLADDER SURGERY     • CARDIAC CATHETERIZATION  03/15/2013    revealing widely patent stents of the left circumflex and ramus intermedius coronary arteries, no significant disease is seen in any territory   • CARDIAC CATHETERIZATION Left 2019    Procedure: Left Heart Cath;  Surgeon: Arelis Tucker MD;  Location:  PREM CATH INVASIVE LOCATION;  Service: Cardiology   • CARDIAC ELECTROPHYSIOLOGY PROCEDURE N/A 5/10/2021    Procedure: PPM battery change;  Surgeon: Arelis Tucker MD;  Location:  PREM CATH INVASIVE LOCATION;  Service: Cardiology;  Laterality: N/A;   • CARDIAC PACEMAKER PLACEMENT     • CATARACT EXTRACTION     • COLONOSCOPY      No family history of colon cancer.     • COLONOSCOPY     • HERNIA REPAIR      Type unknown   • PACEMAKER IMPLANTATION     • PROSTATE SURGERY         Family History:   Family History   Problem Relation Age of Onset   • Cancer Mother    • Diabetes Mother    • Hypertension Mother    • Stroke Mother    • Stomach cancer Mother    • Heart disease Mother    • Stomach cancer Father    • Cancer Father    • Lung cancer Father        Social History:   Social History     Socioeconomic History   • Marital status:    Tobacco Use   • Smoking status: Former Smoker     Packs/day: 1.00     Years: 30.00     Pack years: 30.00     Types: Cigarettes     Quit date: 8/15/2001     Years since quittin.9   • Smokeless tobacco: Never Used   • Tobacco comment: quit 16 years ago   Vaping Use   • Vaping Use: Never  used   Substance and Sexual Activity   • Alcohol use: No   • Drug use: No   • Sexual activity: Defer       Medications:     Current Facility-Administered Medications:   •  acetaminophen (TYLENOL) tablet 650 mg, 650 mg, Oral, Q4H PRN **OR** acetaminophen (TYLENOL) 160 MG/5ML solution 650 mg, 650 mg, Oral, Q4H PRN **OR** acetaminophen (TYLENOL) suppository 650 mg, 650 mg, Rectal, Q4H PRN, Jessenia Willis MD  •  albuterol (PROVENTIL) nebulizer solution 0.083% 2.5 mg/3mL, 2.5 mg, Nebulization, Q6H PRN, Jessenia Willis MD  •  allopurinol (ZYLOPRIM) tablet 100 mg, 100 mg, Oral, Daily, Jessenia Willis MD, 100 mg at 07/12/22 0930  •  amiodarone (PACERONE) tablet 200 mg, 200 mg, Oral, Q24H, Jessenia Willis MD, 200 mg at 07/12/22 0930  •  amLODIPine (NORVASC) tablet 10 mg, 10 mg, Oral, Daily, Jessenia Willis MD, 10 mg at 07/12/22 0930  •  apixaban (ELIQUIS) tablet 5 mg, 5 mg, Oral, Q12H, Jessenia Willis MD, 5 mg at 07/12/22 0930  •  aspirin chewable tablet 81 mg, 81 mg, Oral, Daily, Jessenia Willis MD, 81 mg at 07/12/22 0930  •  atorvastatin (LIPITOR) tablet 20 mg, 20 mg, Oral, Nightly, Jessenia Willis MD, 20 mg at 07/11/22 2106  •  cetirizine (zyrTEC) tablet 10 mg, 10 mg, Oral, Daily, Jessenia Willis MD, 10 mg at 07/12/22 0930  •  cyclobenzaprine (FLEXERIL) tablet 10 mg, 10 mg, Oral, TID PRN, Jessenia Willis MD  •  dextrose (D50W) (25 g/50 mL) IV injection 25 g, 25 g, Intravenous, Q15 Min PRN, Jessenia Willis MD  •  dextrose (GLUTOSE) oral gel 1 tube, 1 tube, Oral, Q15 Min PRN, Jessenia Willis MD  •  FLUoxetine (PROzac) capsule 20 mg, 20 mg, Oral, Daily, Jessenia Willis MD, 20 mg at 07/12/22 0930  •  fluticasone (FLONASE) 50 MCG/ACT nasal spray 1 spray, 1 spray, Each Nare, Daily, Jessenia Willis MD, 1 spray at 07/12/22 0931  •  furosemide (LASIX) injection 40 mg, 40 mg, Intravenous, Q12H, Jessenia Willis MD, 40 mg at 07/12/22 0433  •  gabapentin (NEURONTIN) capsule 400 mg, 400 mg, Oral, TID, Jessenia Willis,  MD, 400 mg at 07/12/22 0930  •  glucagon (human recombinant) (GLUCAGEN DIAGNOSTIC) injection 1 mg, 1 mg, Subcutaneous, PRN, Jessenia Willis MD  •  hydrALAZINE (APRESOLINE) injection 10 mg, 10 mg, Intravenous, Q6H PRN, Jessenia Willis MD, 10 mg at 07/11/22 2159  •  HYDROcodone-acetaminophen (NORCO) 5-325 MG per tablet 1 tablet, 1 tablet, Oral, Q6H PRN, Jessenia Willis MD, 1 tablet at 07/11/22 2239  •  Insulin Aspart (novoLOG) injection 0-9 Units, 0-9 Units, Subcutaneous, TID AC, Jessenia Willis MD, 2 Units at 07/12/22 1210  •  ipratropium-albuterol (DUO-NEB) nebulizer solution 3 mL, 3 mL, Nebulization, 4x Daily - RT, Jessenia Willis MD, 3 mL at 07/12/22 1246  •  lisinopril (PRINIVIL,ZESTRIL) tablet 40 mg, 40 mg, Oral, Daily, Jessenia Willis MD, 40 mg at 07/12/22 0930  •  metoprolol succinate XL (TOPROL-XL) 24 hr tablet 50 mg, 50 mg, Oral, Daily, Jessenia Willis MD, 50 mg at 07/12/22 0930  •  ondansetron (ZOFRAN) injection 4 mg, 4 mg, Intravenous, Q6H PRN, Jessenia Willis MD  •  pantoprazole (PROTONIX) EC tablet 40 mg, 40 mg, Oral, QAM, Jessenia Willis MD, 40 mg at 07/12/22 0624  •  pramipexole (MIRAPEX) tablet 0.5 mg, 0.5 mg, Oral, TID, Jessenia Willis MD, 0.5 mg at 07/12/22 0930  •  sodium chloride 0.9 % flush 10 mL, 10 mL, Intravenous, PRN, Jessenia Willis MD  •  sodium chloride 0.9 % flush 10 mL, 10 mL, Intravenous, Q12H, Jessenia Willis MD, 10 mL at 07/12/22 0931  •  sodium chloride 0.9 % flush 10 mL, 10 mL, Intravenous, PRN, Jessenia Willis MD  •  spironolactone (ALDACTONE) tablet 50 mg, 50 mg, Oral, Daily, Jessenia Willis MD, 50 mg at 07/12/22 0930    Allergies:   No Known Allergies    Objective     Physical Exam:   Vital Signs:   Vitals:    07/12/22 0800 07/12/22 0807 07/12/22 0900 07/12/22 1246   BP: 175/78  149/80    BP Location: Right arm      Patient Position: Sitting      Pulse: 61  60 61   Resp: 15   15   Temp: 97.2 °F (36.2 °C)      TempSrc: Axillary      SpO2: 93%  93% 96%   Weight:  114  "kg (251 lb)     Height:  162.6 cm (64\")       Body mass index is 43.08 kg/m².     Physical Exam  Vitals and nursing note reviewed.   Constitutional:       Appearance: He is obese. He is not ill-appearing or toxic-appearing.   HENT:      Head: Normocephalic and atraumatic.      Mouth/Throat:      Mouth: Mucous membranes are dry.   Eyes:      Extraocular Movements: Extraocular movements intact.      Conjunctiva/sclera: Conjunctivae normal.      Pupils: Pupils are equal, round, and reactive to light.   Cardiovascular:      Rate and Rhythm: Normal rate.   Pulmonary:      Effort: No respiratory distress.      Comments: On 2 L nasal cannula, bilateral chest rise, able speak in full sentences  Abdominal:      General: There is no distension.      Palpations: Abdomen is soft.   Musculoskeletal:      Right lower leg: Edema present.      Left lower leg: Edema present.   Neurological:      General: No focal deficit present.   Psychiatric:         Mood and Affect: Mood normal.         Behavior: Behavior normal.         Labs:   I/O last 3 completed shifts:  In: 240 [P.O.:240]  Out: 2825 [Urine:2825]    Lab Results   Component Value Date    GLUCOSE 325 (H) 07/12/2022    CALCIUM 8.8 07/12/2022     07/12/2022    K 4.1 07/12/2022    CO2 25.7 07/12/2022     07/12/2022    BUN 20 07/12/2022    CREATININE 1.66 (H) 07/12/2022    EGFRIFAFRI 55 (L) 01/21/2022    EGFRIFNONA 41 (L) 02/06/2022    BCR 12.0 07/12/2022    ANIONGAP 12.3 07/12/2022       Lab Results   Component Value Date    WBC 7.92 07/11/2022    HGB 11.8 (L) 07/11/2022    HCT 36.5 (L) 07/11/2022    MCV 91.9 07/11/2022     07/11/2022       No results found for: URINECX    Brief Urine Lab Results  (Last result in the past 365 days)      Color   Clarity   Blood   Leuk Est   Nitrite   Protein   CREAT   Urine HCG        07/11/22 2232 Yellow   Clear   Trace   Negative   Negative   Negative                 Radiographic Studies  XR Chest 1 View    Result Date: " 7/11/2022  Mild CHF.        Images were reviewed, interpreted, and dictated by Dr. Fran Rojas MD Transcribed by Suly Newman PA-C.  This report was signed and finalized on 7/11/2022 3:15 PM by Fran Rojas MD.    CT Angiogram Chest Pulmonary Embolism    Result Date: 7/11/2022  No pulmonary embolus.   Findings are most compatible with cardiogenic pulmonary edema and probable pulmonary arterial hypertension.  Cardiomegaly and coronary artery disease. Authenticated and Electronically Signed by Glenn Ferro MD on 07/11/2022 06:16:36 PM      Patient Active Problem List   Diagnosis   • Pacemaker   • Neck pain   • Chronic low back pain   • Chronic kidney disease, stage III (moderate) (CMS/[facility])   • Emphysema of lung ([facility])   • Essential hypertension   • Seborrheic dermatitis   • Benign prostatic hyperplasia   • Paroxysmal atrial fibrillation ([facility])   • Hypercholesterolemia   • Diastolic heart failure ([facility])   • Coronary artery disease involving native coronary artery of native heart with angina pectoris ([facility])   • Gastroesophageal reflux disease   • Chronic shoulder pain   • Obstructive sleep apnea of adult   • Dysphagia   • Type 2 diabetes mellitus, without long-term current use of insulin ([facility])   • Chronic chest pain   • Restless leg syndrome   • Osteoarthritis of multiple joints   • Multilevel degenerative disc disease   • Moderate episode of recurrent major depressive disorder ([facility])   • Chronic pain syndrome   • DDD (degenerative disc disease), cervical   • BPH (benign prostatic hypertrophy) with urinary retention   • Morbid obesity ([facility])   • Unstable angina ([facility])   • Panlobular emphysema ([facility])   • Leg cramps   • Chronic pain of both knees   • Atrial fibrillation with RVR ([facility])   • Pacemaker at end of battery life   • Acute on chronic congestive heart failure ([facility])   • RMSF ([place] spotted fever)   • Elevated uric acid in blood   • Arthralgia   • Pneumonia of right middle lobe due to infectious organism   •  Chronic respiratory failure with hypoxia (HCC)   • Acute on chronic respiratory failure with hypoxia (HCC)       Assessment / Plan      Assessment/Plan:     Acute on chronic congestive heart failure (HCC)    Pacemaker    Chronic low back pain    Emphysema of lung (HCC)    Essential hypertension    Benign prostatic hyperplasia    Coronary artery disease involving native coronary artery of native heart with angina pectoris (HCC)    Type 2 diabetes mellitus, without long-term current use of insulin (HCC)    BPH (benign prostatic hypertrophy) with urinary retention    Morbid obesity (HCC)    Acute on chronic respiratory failure with hypoxia (HCC)      1. BPH s/p TURP, urinary retention  a. Patient received 80 of IV Lasix in the ER and was unable to void; catheter anchored produced 1300 cc of urine  b. UA with scant hematuria, no bacteriuria, not suspicious for preceding infection  c. Patient denies progression of LUTS since February  d. Could consider restarting alpha-blocker therapy such as Uroxatrol or Flomax, per Dr. Guerra; cystoscopy in February was without prostatic obstruction, no abnormal findings, uroflow borderline abnormal, likely related to underlying detrusor underactivity 2/2 DM      Follow Up:   Retain Devi catheter and we will perform fill and void in clinic in 1 to 2 weeks      Cecily Miranda PA-C  [facility]     Assessment and plan were discussed with attending Dr. Guerra prior to signature of this note     normal...

## 2022-07-19 NOTE — DISCHARGE NOTE PROVIDER - NSDCQMERRANDS_GEN_ALL_CORE
07/18/22 1945   C-SSRS (Frequent Screen)   2. Have you actually had any thoughts of killing yourself? No   6. Have you done anything, started to do anything, or prepared to do anything to end your life? No   Suicide Evaluation Negative Screen - White     Pt up ad danielle on the unit and present in lounge watching movie, but did not appear to socialize. Pt stated having a \" morning was not good, but that's okay, the rest of the day was good.\" Pt requested hs medications early and was requesting this while writer getting report. When writer checked in with pt, pt stated wanting to wait and continue tin the lounge watching the movie and then at 1949 requested and given scheduled med. Pt denies current SI/SH/HI/VAH and pain. Pt asleep at 2130.   Yes

## 2022-08-17 NOTE — PATIENT PROFILE ADULT - ARE ANY OF THE ITEMS ON THE CHART
Pre-op Instructions For Out-Patient Surgery    Medication Instructions:  Please stop herbs and any supplements now (includes vitamins and minerals). Please contact your surgeon and prescribing physician for pre-op instructions for any blood thinners. Stop Ibuprofen as directed    If you have inhalers/aerosol treatments at home, please use them the morning of your surgery and bring the inhalers with you to the hospital.    Please take the following medications the morning of your surgery with a sip of water:    Levothyroxine, Buspar, Inhaler    Surgery Instructions:  After midnight before surgery:  Do not eat or drink anything, including water, mints, gum, and hard candy. You may brush your teeth without swallowing. No smoking, chewing tobacco, or street drugs. Please shower or bathe before surgery. If you were given Surgical Scrub Chlorhexidine Gluconate Liquid (CHG), please shower the night before and the morning of your surgery following the detailed instructions you received during your pre-admission visit. Please do not wear any cologne, lotion, powder, deodorant, jewelry, piercings, perfume, makeup, nail polish, hair accessories, or hair spray on the day of surgery. Wear loose comfortable clothing. Leave your valuables at home. Bring a storage case for any glasses/contacts. An adult who is responsible for you MUST drive you home and should be with you for the first 24 hours after surgery. If having out-patient knee and foot surgeries, please arrange for planned crutches, walker, or wheelchair before arriving to the hospital.    The Day of Surgery:  Arrive at [address] [place] [address] Surgery Entrance at the time directed by your surgeon and check in at the desk. If you have a living will or healthcare power of , please bring a copy. You will be taken to the pre-op holding area where you will be prepared for surgery.   A physical assessment will be performed by a nurse practitioner or house officer. Your IV will be started and you will meet your anesthesiologist.    When you go to surgery, your family will be directed to the surgical waiting room, where the doctor should speak with them after your surgery. After surgery, you will be taken to the recovery room then when you are awake and stable you will go to the short stay unit for preparation to be discharged. If you use a Bi-PAP or C-PAP machine, please bring it with you and leave it in the car in case it is needed in recovery room. no

## 2022-09-09 ENCOUNTER — APPOINTMENT (OUTPATIENT)
Dept: SURGERY | Facility: CLINIC | Age: 77
End: 2022-09-09

## 2022-09-30 ENCOUNTER — NON-APPOINTMENT (OUTPATIENT)
Age: 77
End: 2022-09-30

## 2022-09-30 ENCOUNTER — APPOINTMENT (OUTPATIENT)
Dept: ELECTROPHYSIOLOGY | Facility: CLINIC | Age: 77
End: 2022-09-30

## 2022-09-30 PROCEDURE — 93295 DEV INTERROG REMOTE 1/2/MLT: CPT

## 2022-09-30 PROCEDURE — 93296 REM INTERROG EVL PM/IDS: CPT

## 2022-11-02 NOTE — PROVIDER CONTACT NOTE (MEDICATION) - ASSESSMENT
Patient on Heparin drip @ 10mL, patient specific, post q6h PTT draw resulted supratherapeutic according to patient specific range indicated on order: 107.5
no signs of bleeding
pt resting comfortably in bed, no c/o pain, no s/s of distress. all VSS at this time. no s/s of bleeding
pt vital signs stable. no signs of bleeding. aptt level therapeutic 93.3.
pt vs no ss of distress. no signs of bleeding. aptt result 98.4. target aptt=58-99, pt specific protocol
English

## 2022-11-18 ENCOUNTER — EMERGENCY (EMERGENCY)
Facility: HOSPITAL | Age: 77
LOS: 1 days | Discharge: HOME | End: 2022-11-18
Attending: EMERGENCY MEDICINE
Payer: MEDICARE

## 2022-11-18 VITALS
WEIGHT: 164.91 LBS | SYSTOLIC BLOOD PRESSURE: 156 MMHG | HEIGHT: 62 IN | HEART RATE: 92 BPM | RESPIRATION RATE: 18 BRPM | OXYGEN SATURATION: 94 % | TEMPERATURE: 98 F | DIASTOLIC BLOOD PRESSURE: 71 MMHG

## 2022-11-18 DIAGNOSIS — Z90.49 ACQUIRED ABSENCE OF OTHER SPECIFIED PARTS OF DIGESTIVE TRACT: Chronic | ICD-10-CM

## 2022-11-18 PROCEDURE — 99285 EMERGENCY DEPT VISIT HI MDM: CPT

## 2022-11-18 PROCEDURE — 71045 X-RAY EXAM CHEST 1 VIEW: CPT | Mod: 26

## 2022-11-18 RX ORDER — ACETAMINOPHEN 500 MG
650 TABLET ORAL ONCE
Refills: 0 | Status: COMPLETED | OUTPATIENT
Start: 2022-11-18 | End: 2022-11-18

## 2022-11-18 NOTE — ED PROVIDER NOTE - NSCAREINITIATED _GEN_ER
NOTIFICATION RETURN TO WORK / SCHOOL 
 
3/11/2017 11:13 AM 
 
Ms. Bernadette Alvares [address] [place] [id number] To Whom It May Concern: 
 
Bernadette Alvares is currently under the care of [address] [place] [address]. She will return to work/school on: 3/14/17 If there are questions or concerns please have the patient contact our office. Sincerely, Terrence Vasquez.  DO 
 
 
                                
 
 Jesus Lacy(Resident)

## 2022-11-18 NOTE — ED PROVIDER NOTE - CLINICAL SUMMARY MEDICAL DECISION MAKING FREE TEXT BOX
This emergency department with chronic arthritis pain and abdominal pain.  Patient denies any other systemic symptoms at this time patient is hemodynamically stable and afebrile on presentation.  We will send basic labs and CT abdomen pelvis with IV contrast to rule out any acute abdominal pathologies or any infectious etiologies at this time.  Patient's pain will be treated with Ofirmev IV.  Patient will be reassessed after medication is administered.

## 2022-11-18 NOTE — ED PROVIDER NOTE - NSICDXPASTSURGICALHX_GEN_ALL_CORE_FT
PAST SURGICAL HISTORY:  AICD (Automatic Cardioverter/Defibrillator) Present inserted in Aug, 2008. Due for battery change in 1 month. ( [employer]) . Inserted by Dr Duffy    S/P cholecystectomy     Status post left hemicolectomy

## 2022-11-18 NOTE — ED PROVIDER NOTE - NSICDXPASTMEDICALHX_GEN_ALL_CORE_FT
PAST MEDICAL HISTORY:  Asthma     Atrial flutter     Cardiac Pacemaker     Cardiomyopathy     Congestive Heart Failure     Diabetes     Diverticulitis     Gout     HTN - Hypertension     Kidney stone     Patient is Denominational     Refusal of blood transfusions as patient is Denominational     Vertigo

## 2022-11-18 NOTE — ED PROVIDER NOTE - PATIENT PORTAL LINK FT
You can access the FollowMyHealth Patient Portal offered by [facility] by registering at the following website: [email or web address]. By joining [facility]’s FollowMyHealth portal, you will also be able to view your health information using other applications (apps) compatible with our system.

## 2022-11-18 NOTE — ED PROVIDER NOTE - CONSTITUTIONAL, MLM
normal... Well appearing, awake, alert, oriented to person, place, time/situation and in no apparent distress. Aklief counseling:  Patient advised to apply a pea-sized amount only at bedtime and wait 30 minutes after washing their face before applying.  If too drying, patient may add a non-comedogenic moisturizer.  The most commonly reported side effects including irritation, redness, scaling, dryness, stinging, burning, itching, and increased risk of sunburn.  The patient verbalized understanding of the proper use and possible adverse effects of retinoids.  All of the patient's questions and concerns were addressed.

## 2022-11-18 NOTE — ED PROVIDER NOTE - RAPID ASSESSMENT
77y F w/ pmhx of hernia, vertigo, atrial flutter, diverticulitis, cardiomyopathy, kidney stone, HTN, DM, congestive heart failure, arthritis presents to the ED c/o  cramping bilateral hands and toes, lower back pain, abd pain. Denies vomiting, urinary symptoms. Denies fall. Took Tylenol at 3pm today w/o relief. Pt is well appearing in triage.    Haleigh OCHOA (Annaibterry) have documented this rapid assessment note under the dictation of Chan Brooks) which has been reviewed and affirmed to be accurate. Patient was seen as a QDOC patient. The patient will be seen and further worked up in the main emergency department and their care will be completed by the main emergency department team along with a thorough physical exam. Receiving team will follow up on labs, analgesia, any clinical imaging, reassess and disposition as clinically indicated, all decisions regarding the progression of care will be made at their discretion. 77y F w/ pmhx of hernia, vertigo, atrial flutter, diverticulitis, cardiomyopathy, kidney stone, HTN, DM, congestive heart failure, arthritis presents to the ED c/o  cramping bilateral hands and toes, lower back pain, abd pain. Denies vomiting, urinary symptoms. Denies fall. Took Tylenol at 3pm today w/o relief. Pt is well appearing in triage.    IHaleigh (Annaibterry) have documented this rapid assessment note under the dictation of Chan Brooks (MD) which has been reviewed and affirmed to be accurate. Patient was seen as a QDOC patient. The patient will be seen and further worked up in the main emergency department and their care will be completed by the main emergency department team along with a thorough physical exam. Receiving team will follow up on labs, analgesia, any clinical imaging, reassess and disposition as clinically indicated, all decisions regarding the progression of care will be made at their discretion.    Chan Brooks MD (Attending)  I saw this patient in Tele-Triage/QDoc. I agree with the scribterry's documentation. Full H&P/assessment to be performed in Emergency Department and will follow-up on any labs/studies ordered.

## 2022-11-18 NOTE — ED PROVIDER NOTE - OBJECTIVE STATEMENT
Patient is a 77-year-old with past medical history of hernia, vertigo, a flutter, diverticulitis status post surgery, hypertension, diabetes, congestive heart failure, arthritis presents emergency department with bilateral hand pain, rectal pain, lower back pain and abdominal pain.  Patient states she has chronic hand pain due to her arthritis.  She is alert complaining of diffuse abdominal pain that started after her surgery.  She has taken Tylenol at home with no relief.  Denies any fevers, chills, shortness of breath, chest pain, nausea, vomiting, dysuria, blood in her stool, hematuria. Patient is a 77-year-old with past medical history of hernia, vertigo, a flutter, diverticulitis status post surgery, hypertension, diabetes, congestive heart failure, arthritis presents emergency department with bilateral hand pain, rectal pain, lower back pain and abdominal pain.  Patient states she has chronic hand pain due to her arthritis.  She is alert complaining of diffuse abdominal pain that started after her surgery.  She has taken Tylenol at home with no relief.  Denies any fevers, chills, shortness of breath, chest pain, nausea, vomiting, dysuria, blood in her stool, hematuria.    Attendin-year-old female presents with bilateral hand pain and cramping that this is similar to arthritis pain that she has normally.  Also she had abdominal pain which she has been having for 3 years earlier.  Pain is now resolved and she feels better.

## 2022-11-19 VITALS
SYSTOLIC BLOOD PRESSURE: 131 MMHG | DIASTOLIC BLOOD PRESSURE: 64 MMHG | RESPIRATION RATE: 19 BRPM | HEART RATE: 78 BPM | TEMPERATURE: 98 F | OXYGEN SATURATION: 98 %

## 2022-11-19 LAB
ALBUMIN SERPL ELPH-MCNC: 4.1 G/DL — SIGNIFICANT CHANGE UP (ref 3.3–5)
ALP SERPL-CCNC: 101 U/L — SIGNIFICANT CHANGE UP (ref 40–120)
ALT FLD-CCNC: 13 U/L — SIGNIFICANT CHANGE UP (ref 10–45)
ANION GAP SERPL CALC-SCNC: 11 MMOL/L — SIGNIFICANT CHANGE UP (ref 5–17)
AST SERPL-CCNC: 16 U/L — SIGNIFICANT CHANGE UP (ref 10–40)
BASE EXCESS BLDV CALC-SCNC: 7.5 MMOL/L — HIGH (ref -2–3)
BASOPHILS # BLD AUTO: 0.04 K/UL — SIGNIFICANT CHANGE UP (ref 0–0.2)
BASOPHILS NFR BLD AUTO: 0.4 % — SIGNIFICANT CHANGE UP (ref 0–2)
BILIRUB SERPL-MCNC: 0.3 MG/DL — SIGNIFICANT CHANGE UP (ref 0.2–1.2)
BUN SERPL-MCNC: 17 MG/DL — SIGNIFICANT CHANGE UP (ref 7–23)
CA-I SERPL-SCNC: 1.05 MMOL/L — LOW (ref 1.15–1.33)
CALCIUM SERPL-MCNC: 9.2 MG/DL — SIGNIFICANT CHANGE UP (ref 8.4–10.5)
CHLORIDE BLDV-SCNC: 101 MMOL/L — SIGNIFICANT CHANGE UP (ref 96–108)
CHLORIDE SERPL-SCNC: 99 MMOL/L — SIGNIFICANT CHANGE UP (ref 96–108)
CO2 BLDV-SCNC: 36 MMOL/L — HIGH (ref 22–26)
CO2 SERPL-SCNC: 30 MMOL/L — SIGNIFICANT CHANGE UP (ref 22–31)
CREAT SERPL-MCNC: 1.11 MG/DL — SIGNIFICANT CHANGE UP (ref 0.5–1.3)
EGFR: 52 ML/MIN/1.73M2 — LOW
EOSINOPHIL # BLD AUTO: 0.1 K/UL — SIGNIFICANT CHANGE UP (ref 0–0.5)
EOSINOPHIL NFR BLD AUTO: 1 % — SIGNIFICANT CHANGE UP (ref 0–6)
FLUAV AG NPH QL: SIGNIFICANT CHANGE UP
FLUBV AG NPH QL: SIGNIFICANT CHANGE UP
GAS PNL BLDV: 137 MMOL/L — SIGNIFICANT CHANGE UP (ref 136–145)
GAS PNL BLDV: SIGNIFICANT CHANGE UP
GAS PNL BLDV: SIGNIFICANT CHANGE UP
GLUCOSE BLDV-MCNC: 181 MG/DL — HIGH (ref 70–99)
GLUCOSE SERPL-MCNC: 172 MG/DL — HIGH (ref 70–99)
HCO3 BLDV-SCNC: 34 MMOL/L — HIGH (ref 22–29)
HCT VFR BLD CALC: 37.2 % — SIGNIFICANT CHANGE UP (ref 34.5–45)
HCT VFR BLDA CALC: 36 % — SIGNIFICANT CHANGE UP (ref 34.5–46.5)
HGB BLD CALC-MCNC: 12 G/DL — SIGNIFICANT CHANGE UP (ref 11.7–16.1)
HGB BLD-MCNC: 11.8 G/DL — SIGNIFICANT CHANGE UP (ref 11.5–15.5)
IMM GRANULOCYTES NFR BLD AUTO: 0.4 % — SIGNIFICANT CHANGE UP (ref 0–0.9)
LACTATE BLDV-MCNC: 2 MMOL/L — SIGNIFICANT CHANGE UP (ref 0.5–2)
LIDOCAIN IGE QN: 11 U/L — SIGNIFICANT CHANGE UP (ref 7–60)
LYMPHOCYTES # BLD AUTO: 1.64 K/UL — SIGNIFICANT CHANGE UP (ref 1–3.3)
LYMPHOCYTES # BLD AUTO: 15.8 % — SIGNIFICANT CHANGE UP (ref 13–44)
MCHC RBC-ENTMCNC: 31.1 PG — SIGNIFICANT CHANGE UP (ref 27–34)
MCHC RBC-ENTMCNC: 31.7 GM/DL — LOW (ref 32–36)
MCV RBC AUTO: 98.2 FL — SIGNIFICANT CHANGE UP (ref 80–100)
MONOCYTES # BLD AUTO: 0.75 K/UL — SIGNIFICANT CHANGE UP (ref 0–0.9)
MONOCYTES NFR BLD AUTO: 7.2 % — SIGNIFICANT CHANGE UP (ref 2–14)
NEUTROPHILS # BLD AUTO: 7.8 K/UL — HIGH (ref 1.8–7.4)
NEUTROPHILS NFR BLD AUTO: 75.2 % — SIGNIFICANT CHANGE UP (ref 43–77)
NRBC # BLD: 0 /100 WBCS — SIGNIFICANT CHANGE UP (ref 0–0)
NT-PROBNP SERPL-SCNC: 164 PG/ML — SIGNIFICANT CHANGE UP (ref 0–300)
PCO2 BLDV: 58 MMHG — HIGH (ref 39–42)
PH BLDV: 7.38 — SIGNIFICANT CHANGE UP (ref 7.32–7.43)
PLATELET # BLD AUTO: 203 K/UL — SIGNIFICANT CHANGE UP (ref 150–400)
PO2 BLDV: 31 MMHG — SIGNIFICANT CHANGE UP (ref 25–45)
POTASSIUM BLDV-SCNC: 5.9 MMOL/L — HIGH (ref 3.5–5.1)
POTASSIUM SERPL-MCNC: 3.8 MMOL/L — SIGNIFICANT CHANGE UP (ref 3.5–5.3)
POTASSIUM SERPL-SCNC: 3.8 MMOL/L — SIGNIFICANT CHANGE UP (ref 3.5–5.3)
PROT SERPL-MCNC: 7.4 G/DL — SIGNIFICANT CHANGE UP (ref 6–8.3)
RBC # BLD: 3.79 M/UL — LOW (ref 3.8–5.2)
RBC # FLD: 14.4 % — SIGNIFICANT CHANGE UP (ref 10.3–14.5)
RSV RNA NPH QL NAA+NON-PROBE: SIGNIFICANT CHANGE UP
SAO2 % BLDV: 48.8 % — LOW (ref 67–88)
SARS-COV-2 RNA SPEC QL NAA+PROBE: SIGNIFICANT CHANGE UP
SODIUM SERPL-SCNC: 140 MMOL/L — SIGNIFICANT CHANGE UP (ref 135–145)
TROPONIN T, HIGH SENSITIVITY RESULT: 27 NG/L — SIGNIFICANT CHANGE UP (ref 0–51)
WBC # BLD: 10.37 K/UL — SIGNIFICANT CHANGE UP (ref 3.8–10.5)
WBC # FLD AUTO: 10.37 K/UL — SIGNIFICANT CHANGE UP (ref 3.8–10.5)

## 2022-11-19 PROCEDURE — 84295 ASSAY OF SERUM SODIUM: CPT

## 2022-11-19 PROCEDURE — 83880 ASSAY OF NATRIURETIC PEPTIDE: CPT

## 2022-11-19 PROCEDURE — 87637 SARSCOV2&INF A&B&RSV AMP PRB: CPT

## 2022-11-19 PROCEDURE — 82330 ASSAY OF CALCIUM: CPT

## 2022-11-19 PROCEDURE — 74177 CT ABD & PELVIS W/CONTRAST: CPT | Mod: 26,MA

## 2022-11-19 PROCEDURE — 82947 ASSAY GLUCOSE BLOOD QUANT: CPT

## 2022-11-19 PROCEDURE — 85025 COMPLETE CBC W/AUTO DIFF WBC: CPT

## 2022-11-19 PROCEDURE — 74177 CT ABD & PELVIS W/CONTRAST: CPT | Mod: MA

## 2022-11-19 PROCEDURE — 85018 HEMOGLOBIN: CPT

## 2022-11-19 PROCEDURE — 84132 ASSAY OF SERUM POTASSIUM: CPT

## 2022-11-19 PROCEDURE — 80053 COMPREHEN METABOLIC PANEL: CPT

## 2022-11-19 PROCEDURE — 96365 THER/PROPH/DIAG IV INF INIT: CPT | Mod: XU

## 2022-11-19 PROCEDURE — 99285 EMERGENCY DEPT VISIT HI MDM: CPT | Mod: 25

## 2022-11-19 PROCEDURE — 82435 ASSAY OF BLOOD CHLORIDE: CPT

## 2022-11-19 PROCEDURE — 83690 ASSAY OF LIPASE: CPT

## 2022-11-19 PROCEDURE — 83735 ASSAY OF MAGNESIUM: CPT

## 2022-11-19 PROCEDURE — 36415 COLL VENOUS BLD VENIPUNCTURE: CPT

## 2022-11-19 PROCEDURE — 82803 BLOOD GASES ANY COMBINATION: CPT

## 2022-11-19 PROCEDURE — 85014 HEMATOCRIT: CPT

## 2022-11-19 PROCEDURE — 84484 ASSAY OF TROPONIN QUANT: CPT

## 2022-11-19 PROCEDURE — 93005 ELECTROCARDIOGRAM TRACING: CPT

## 2022-11-19 PROCEDURE — 83605 ASSAY OF LACTIC ACID: CPT

## 2022-11-19 PROCEDURE — 71045 X-RAY EXAM CHEST 1 VIEW: CPT

## 2022-11-19 RX ORDER — ACETAMINOPHEN 500 MG
1000 TABLET ORAL ONCE
Refills: 0 | Status: COMPLETED | OUTPATIENT
Start: 2022-11-19 | End: 2022-11-19

## 2022-11-19 RX ADMIN — Medication 1000 MILLIGRAM(S): at 03:50

## 2022-11-19 RX ADMIN — Medication 400 MILLIGRAM(S): at 03:17

## 2022-11-19 RX ADMIN — Medication 1000 MILLIGRAM(S): at 04:00

## 2022-11-19 NOTE — ED ADULT NURSE NOTE - NSICDXPASTMEDICALHX_GEN_ALL_CORE_FT
PAST MEDICAL HISTORY:  Asthma     Atrial flutter     Cardiac Pacemaker     Cardiomyopathy     Congestive Heart Failure     Diabetes     Diverticulitis     Gout     HTN - Hypertension     Kidney stone     Patient is Latter-day     Refusal of blood transfusions as patient is Latter-day     Vertigo

## 2022-11-19 NOTE — ED ADULT NURSE NOTE - NSICDXPASTSURGICALHX_GEN_ALL_CORE_FT
PAST SURGICAL HISTORY:  AICD (Automatic Cardioverter/Defibrillator) Present inserted in Aug, 2008. Due for battery change in 1 month. ( [employer]) . Inserted by Dr Duffy    S/P cholecystectomy     Status post left hemicolectomy

## 2022-11-19 NOTE — ED ADULT NURSE NOTE - NSIMPLEMENTINTERV_GEN_ALL_ED
Implemented All Fall with Harm Risk Interventions:  Sylmar to call system. Call bell, personal items and telephone within reach. Instruct patient to call for assistance. Room bathroom lighting operational. Non-slip footwear when patient is off stretcher. Physically safe environment: no spills, clutter or unnecessary equipment. Stretcher in lowest position, wheels locked, appropriate side rails in place. Provide visual cue, wrist band, yellow gown, etc. Monitor gait and stability. Monitor for mental status changes and reorient to person, place, and time. Review medications for side effects contributing to fall risk. Reinforce activity limits and safety measures with patient and family. Provide visual clues: red socks.

## 2022-11-19 NOTE — ED ADULT NURSE NOTE - OBJECTIVE STATEMENT
77y female PMH afib, diverticulitis, pacemaker placement, cardiomyopathy, HTN, DM, CHF to the ED from home via triage c/o of joint pain. Pt reports several weeks of joint pain which has acutely worsened in the last week. Pt reports joint pain and stiffness in the hands and feet. Pt has severe pain to touch in the lower extremities. Pt states "its an arthritis problem. Pt also reports a nonproductive cough for about a week. Pt denies chest pain, palpitations, shortness of breath, headache, visual disturbances, numbness/tingling, fever, chills, diaphoresis,  nausea, vomiting, constipation, diarrhea, or urinary symptoms. Stretcher in lowest position and locked, appropriate side rails in place, room cleared of clutter and safety hazards, blankets given for comfort

## 2022-11-29 NOTE — PHYSICAL THERAPY INITIAL EVALUATION ADULT - IMPAIRED TRANSFERS: SIT/STAND, REHAB EVAL
The patient is a 50 year old male who came for anxiety.    Patient has history of alcohol abuse, anxiety, ADHD.  As per documentation there was also history of drug abuse, patient states he has been clean for many years.  Patient made the appointment because he wants to go back to his previous medications, clonazepam and amphetamine.  Patient states that his prior provider became quit cold turkey, he is not taking any of those medication at the moment.     He states that he stopped drinking 3 to 4 months ago; which was about the time he has had a seizure related to alcohol. Patient denied alcohol craving and is not interested in naltrexone or medications for alcohol.     He has history of hemorrhoids, for which he states had surgery    Rivera has SABINE, he is not on CPAP.      He has history of ulcerative colitis, he states it caused by drinking.     He has history of oral herpes.     Patient is smoker, he is interested in quitting in the future.    Last four GAD7 Assessments       GAD7 11/29/2022   GAD7 Score 21   Feeling nervous, anxious or on edge Nearly every day   Not being able to stop or control worrying Nearly every day   Worrying too much about different things Nearly every day   Trouble relaxing Nearly every day   Being so restless that it's hard to sit still Nearly every day   Becoming easily annoyed or irritable Nearly every day   Feeling afraid as if something awful might happen Nearly every day   Ability to handle work, home and other people Somewhat difficult       Recent Review Flowsheet Data     Date 11/29/2022    Adult PHQ 2 Score 5    Adult PHQ 2 Interpretation Further screening needed    Little interest or pleasure in activity? More than half the days    Feeling down, depressed or hopeless? Nearly every day    Adult PHQ 9 Score 18    Adult PHQ 9 Interpretation Moderately Severe Depression    Trouble falling or staying asleep or sleeping all the time? Nearly every day    Feeling tired or having little  energy? More than half the days    Poor appetite or overeating? Not at all    Feeling bad about yourself or that you are a failure or have let yourself or family down? More than half the days    Trouble concentrating on things such as reading the newspaper or watching TV? Nearly every day    Moving or speaking slowly that other people have noticed or the opposite - being so fidgety or restless that you have been moving around a lot more than usual? Nearly every day    Thoughts that you would be better off dead or of hurting yourself in some way? Not at all    If you reported any problems, how difficult have these problems made it to do your work, take care of things at home, or get along with other people? Somewhat difficult          DEPRESSION ASSESSMENT/PLAN:  Referred to Behavioral Health and/or Psychiatry     MEDICATIONS:  No outpatient medications have been marked as taking for the 11/29/22 encounter (Office Visit) with Sabine Cheung MD.       ALLERGIES:  Allergies as of 11/29/2022 - Reviewed 11/29/2022   Allergen Reaction Noted   • Codeine PRURITUS and Other (See Comments) 12/26/2003   • Propoxyphene n-apap Other (See Comments) 12/03/2012   • Toradol HIVES 08/14/2015   • Tramadol Other (See Comments) 12/03/2012   • Trichophyton Other (See Comments) 01/24/2013       HISTORIES:  Patient Active Problem List   Diagnosis   • Alcohol withdrawal seizure (CMS/[facility])   • Anxiety, generalized   • Attention deficit hyperactivity disorder (ADHD)   • Benzodiazepine withdrawal (CMS/[facility])   • Hemorrhoids   • COPD (chronic obstructive pulmonary disease) (CMS/[facility])   • Obstructive sleep apnea syndrome   • Smoker   • Tobacco dependence syndrome   • Seasonal allergies   • Ulcerative colitis (CMS/[facility])   • Drug abuse (CMS/HCC)   • Personality disorder (CMS/HCC)   • Chronic back pain   • Anal fistula   • Anal warts   • Psychoactive substance dependence (CMS/[facility])   • Borderline intellectual disability       No past surgical history  on file.    Family History   Problem Relation Age of Onset   • Diabetes Mother    • Alcohol Abuse Father    • Hypertension Father        Social History     Occupational History   • Not on file   Tobacco Use   • Smoking status: Current Every Day Smoker     Packs/day: 1.50     Types: Cigarettes   • Smokeless tobacco: Never Used   Substance and Sexual Activity   • Alcohol use: Yes   • Drug use: Yes     Types: Marijuana, Prescription Drugs   • Sexual activity: Not Currently       Health Maintenance Due   Topic Date Due   • COVID-19 Vaccine (1) Never done   • Pneumococcal Vaccine 0-64 (1 - PCV) Never done   • Hepatitis B Vaccine (3 of 3 - 3-dose series) 12/07/2006   • DTaP/Tdap/Td Vaccine (1 - Tdap) 12/02/2012   • Colorectal Cancer Screen-  Never done   • Traditional Medicare- Medicare Wellness Visit  Never done   • Influenza Vaccine (1) Never done       Patient is due for topics as listed above but is not proceeding with any preventive services.     REVIEW OF SYSTEMS:     Constitutional:  Denies excessive fatigue, unexplained weight loss.  Respiratory:  Denies chronic cough, wheezing, shortness of breath.  Cardiovascular:  Denies chest pain, palpitations, ankle swelling.  Neurologic:  Denies headache, unexplained dizziness,  focal weakness, sensory changes  Psychiatric:  Denies  suicidal thoughts.    PHYSICAL EXAM  Vitals:  Blood pressure (!) 146/88, pulse 98, resp. rate 16, height 5' 6.5\" (1.689 m), weight 72.8 kg (160 lb 8 oz), SpO2 98 %..  Constitutional:  Well developed,  no acute distress,   Eyes:  Pupils equal, conjunctivae erythematous, no icterus.  HENT:  Atraumatic,  Respiratory:  No respiratory distress, normal breath sounds, no rales, no wheezing.   Cardiovascular:  Normal rate, normal rhythm, no murmurs, no gallops, no rubs.  Gastrointestinal:  Soft, nondistended, normal bowel sounds, nontender, no rebound, no guarding.    Neurologic:   normal motor function, no focal motor or sensory deficits  noted  Psychiatric:  Poorly groomed, tangential speech      ASSESSMENT/PLAN:    ADITHYA (generalized anxiety disorder)/ severe depression/ alcohol abuse  Patient states he is only interested in resuming clonazepam and amphetamine  He mentioned he is not interested in taking other medication for anxiety or depression, he states \"I do not want to be Guinea pig for all those medication\"  Patient had problem with prior PCP due to refusal to provide control substance  We explained to the patient that I would not be able to place him on long-term clonazepam or medication for ADHD  I advised him to establish care with behavioral health, he agrees  Patient states that he will establish care with primary care provider closer to his house  - SERVICE TO BEHAVIORAL HEALTH    Primary hypertension  We discussed starting treatment for hypertension  Patient states that he is not able to return to clinic for follow-up due to distance  He will like to establish care with a provider closer to his house        Return for Patient will establish with different provider closer his house.    I spent a total of 30 minutes on the day of the visit.  This includes chart review and documenting.      decreased strength

## 2022-12-11 NOTE — CONSULT NOTE ADULT - ASSESSMENT
Impression:  1) Leakage from Delgado stump  2) Abdominal collection    Recommendations:  -Plan for colonoscopy/flex sig today  -Keep NPO No cyanosis, no pallor, no jaundice, no rash

## 2022-12-29 NOTE — ED PROVIDER NOTE - NPI NUMBER (FOR SYSADMIN USE ONLY) :
Refill      lisdexamfetamine (VYVANSE) 50 MG capsule                     [facility] [place], [address] [place] [address]   Phone:  [phone]  Fax:  [phone]
[[id number]]

## 2022-12-30 ENCOUNTER — APPOINTMENT (OUTPATIENT)
Dept: ELECTROPHYSIOLOGY | Facility: CLINIC | Age: 77
End: 2022-12-30

## 2022-12-30 NOTE — H&P CARDIOLOGY - PSYCHIATRIC
Sent msg to the front re:[employer] insurance and to advise pt   Affect and characteristics of appearance, verbalizations, behaviors are appropriate

## 2023-02-06 NOTE — CHART NOTE - NSCHARTNOTEFT_GEN_A_CORE
POST-OPERATIVE NOTE    Subjective:  Patient is s/p OTSC of rectal stump leak/fistula. Recovering appropriately.     Vital Signs Last 24 Hrs  T(C): 36.6 (17 Dec 2019 14:11), Max: 36.8 (17 Dec 2019 02:37)  T(F): 97.8 (17 Dec 2019 14:11), Max: 98.3 (17 Dec 2019 02:37)  HR: 83 (17 Dec 2019 14:11) (83 - 93)  BP: 104/65 (17 Dec 2019 14:11) (101/64 - 114/74)  BP(mean): --  RR: 18 (17 Dec 2019 14:11) (17 - 18)  SpO2: 98% (17 Dec 2019 14:11) (95% - 98%)  I&O's Detail    16 Dec 2019 07:01  -  17 Dec 2019 07:00  --------------------------------------------------------  IN:    dextrose 5% + sodium chloride 0.45% with potassium chloride 20 mEq/L: 1350 mL    Oral Fluid: 380 mL  Total IN: 1730 mL    OUT:    Colostomy: 350 mL    Voided: 2400 mL  Total OUT: 2750 mL    Total NET: -1020 mL      17 Dec 2019 07:01  -  17 Dec 2019 20:50  --------------------------------------------------------  IN:  Total IN: 0 mL    OUT:    Voided: 250 mL  Total OUT: 250 mL    Total NET: -250 mL        enalapril 10  enoxaparin Injectable 40  furosemide   Oral Tab/Cap - Peds 40  spironolactone 25    PAST MEDICAL & SURGICAL HISTORY:  Refusal of blood transfusions as patient is Gnosticism  Patient is Gnosticism  Vertigo  Atrial flutter  Diverticulitis  Cardiomyopathy  Kidney stone  Cardiac Pacemaker  HTN - Hypertension  Gout  Diabetes  Congestive Heart Failure  Asthma  Status post left hemicolectomy  S/P cholecystectomy  AICD (Automatic Cardioverter/Defibrillator) Present: inserted in Aug, 2008. Due for battery change in 1 month. ( [employer]) . Inserted by Dr Duffy    OBJECTIVE  PHYSICAL EXAM  General: Appears well, NAD  CHEST: breathing comfortably  CV: appears well perfused  Abdomen: soft, nontender, nondistended, no rebound or guarding, ostomy pink +/+, fistula minimal output, L drain no output.   : No rectal drainage noted.  Extremities: Grossly symmetric  Ostomy: pink and viable, gas and stool in bag      LABS:                        9.1    8.77  )-----------( 252      ( 17 Dec 2019 08:56 )             28.4     12-17    137  |  100  |  <4<L>  ----------------------------<  121<H>  4.3   |  23  |  0.66    Ca    9.3      17 Dec 2019 07:40  Phos  4.6     12-17  Mg     1.9     12-17      PT/INR - ( 16 Dec 2019 07:19 )   PT: 15.0 sec;   INR: 1.31 ratio         PTT - ( 16 Dec 2019 07:19 )  PTT:33.0 sec    CAPILLARY BLOOD GLUCOSE  POCT Blood Glucose.: 123 mg/dL (17 Dec 2019 18:16)  POCT Blood Glucose.: 137 mg/dL (17 Dec 2019 12:11)  POCT Blood Glucose.: 125 mg/dL (16 Dec 2019 21:29)      Radiology and Additional Studies:    Assessment:  74y old female with a PMH notable for perforated sigmoid diverticulitis s/p ex-lap with creation of end transverse colostomy in July of this year complicated by sigmoid stump leak, abdominal abscess s/p IR drainage x 2 presents with LLQ abdominal pain and ongoing sigmoid stump leak. Now s/p sigmoidoscopy with OTSC deployment at site of dehiscence     Plan:  - Diet: Regular  - Follow IR drains, percutaneous drain seen at fistula site prior to clip placement  - Pain control as needed   - Resume DVT ppx  - OOB and ambulating as tolerated  - F/u AM labs Bi-Rhombic Flap Text: The defect edges were debeveled with a #15 scalpel blade.  Given the location of the defect and the proximity to free margins a bi-rhombic flap was deemed most appropriate.  Using a sterile surgical marker, an appropriate rhombic flap was drawn incorporating the defect. The area thus outlined was incised deep to adipose tissue with a #15 scalpel blade.  The skin margins were undermined to an appropriate distance in all directions utilizing iris scissors.

## 2023-02-27 NOTE — PROGRESS NOTE ADULT - PROVIDER SPECIALTY LIST ADULT
SICU If you are a smoker, it is important for your health to stop smoking. Please be aware that second hand smoke is also harmful.

## 2023-03-17 NOTE — ED ADULT NURSE NOTE - NURSING [facility]
Physical Therapy Evaluation    Patient Name:  Sonali Feliz   MRN:  [id number]    Recommendations:     Discharge Recommendations: home health PT, home health OT, home health speech therapy   Discharge Equipment Recommendations: none   Barriers to Discharge: None    Assessment:     Sonali Feliz is a 81 y.o. female admitted with a medical diagnosis of Acute on chronic diastolic congestive heart failure.  She presents with the following impairments/functional limitations: impaired self care skills, impaired functional mobility, gait instability, impaired balance, impaired cardiopulmonary response to activity. Pt presenting with expressive aphasia from her stroke in 2021. No noted strength or sensation deficits, ambulated 25 ft with no AD with SBA/CGA with further gait limited by pt being in ICU. Recommending HH PT/OT/SLP upon d/c.    Rehab Prognosis: Good; patient would benefit from acute skilled PT services to address these deficits and reach maximum level of function.    Recent Surgery: * No surgery found *      Plan:     During this hospitalization, patient to be seen 2 x/week to address the identified rehab impairments via gait training, therapeutic activities, therapeutic exercises, neuromuscular re-education and progress toward the following goals:    Plan of Care Expires:  04/17/23    Subjective     Chief Complaint: none  Patient/Family Comments/goals: pt's daughters present at bedside reporting pt has good family support at home  Pain/Comfort:  Pain Rating 1: 0/10  Pain Rating Post-Intervention 1: 0/10    Patients cultural, spiritual, Gnosticism conflicts given the current situation: no    Living Environment:  Pt lives with her son in a 2 story house with 4 LUISA with B rails (wide rails can only reach 1 at a time). Pt's bedroom/bathroom are on the 1st floor, tub/shower combo with shower chair.  Occasionally stays at her daughters' houses - they assist her with bathing.  Prior to admission, patients level of function  was supervision/mod I without AD for mobility and ADLs, does not drive or cook, enjoys gardening. Equipment used at home: bedside commode, cane, straight, walker, rolling, shower chair.  DME owned (not currently used): rolling walker, single point cane, and bedside commode.  Upon discharge, patient will have assistance from her family.    Objective:     Communicated with nurse prior to session.  Patient found HOB elevated with oxygen, PureWick (ICU monitoring)  upon PT entry to room.    General Precautions: Standard, fall  Orthopedic Precautions:N/A   Braces: N/A  Respiratory Status: Nasal cannula, flow 1.5 L/min    Exams:  Expressive aphasia - increased time and effort to answer questions, requires repetition and demonstration intermittently to follow therapist's commands  Cognitive Exam:  Patient is oriented to name and birth year, current year only  Gross Motor Coordination:  WFL  Postural Exam:  Patient presented with the following abnormalities:    -       Rounded shoulders  -       Forward head  Sensation:    -       Intact  Skin Integrity/Edema:      -       Skin integrity: Visible skin intact  RLE ROM: WFL  RLE Strength: WFL  LLE ROM: WFL  LLE Strength: WFL    Functional Mobility:  Bed Mobility:     Scooting: supervision  Supine to Sit: supervision  Transfers:     Sit to Stand:  contact guard assistance with no AD  Bed to Chair: stand by assistance and contact guard assistance with  no AD  using  Step Transfer  Gait: ~25 ft with no AD with SBA/CGA - limited by lines in ICU - flexed posture but no overt LOB noted.      AM-PAC 6 CLICK MOBILITY  Total Score:18       Treatment & Education:  Educated pt on role of PT, pt agreeable to participate in therapy session.  Transitioned to sit EOB, sits with supervision with rounded shoulders - cues for upright posture.  Pt denies dizziness throughout - BP assessed and elevated throughout session (170-180s/80s)  Completed stand and ambulation as above prior to sitting up  in chair.  Educated nurse on pt's mobility status.  Pt reclined in chair.    Patient left up in chair with all lines intact, call button in reach, and pt's daughters and nurse present.    GOALS:   Multidisciplinary Problems       Physical Therapy Goals          Problem: Physical Therapy    Goal Priority Disciplines Outcome Goal Variances Interventions   Physical Therapy Goal     PT, PT/OT Ongoing, Progressing     Description: Goals to be met by: 23     Patient will increase functional independence with mobility by performin. Supine <> sit with Modified [place]  2. Sit to stand transfer with Supervision  3. Bed to chair transfer with Supervision   4. Gait  x 100 feet with Supervision   5. Lower extremity exercise program x10 reps per handout, with supervision                         History:     Past Medical History:   Diagnosis Date    Anxiety     Cellulitis of left hand 2018    CHF (congestive heart failure)     Clubbed toes     Coronary artery disease     Diabetic retinopathy 2017    Encounter for blood transfusion     High cholesterol     Hypertension     Stroke 2021    Type 2 diabetes mellitus with diabetic polyneuropathy, with long-term current use of insulin        Past Surgical History:   Procedure Laterality Date    CATARACT EXTRACTION W/  INTRAOCULAR LENS IMPLANT Bilateral      SECTION      EYE SURGERY      laser    INCISION AND DRAINAGE FOOT Right 2019    Procedure: INCISION AND DRAINAGE, FOOT;  Surgeon: Marcos Martin DPM;  Location: [facility] OR;  Service: Podiatry;  Laterality: Right;    INCISION AND DRAINAGE OF HAND Left 2018    Procedure: INCISION AND DRAINAGE, HAND;  Surgeon: Clyde Ortiz Jr., MD;  Location: [facility] OR;  Service: Orthopedics;  Laterality: Left;    SPLENECTOMY, TOTAL  2005    TONSILLECTOMY      TUBAL LIGATION         Time Tracking:     PT Received On: 23  PT Start Time: 1427     PT Stop Time: 1504  PT Total Time (min): 37  min     Billable Minutes: Evaluation 15 and Gait Training 10      03/17/2023   no pain, swelling or deformity of joints

## 2023-03-31 ENCOUNTER — NON-APPOINTMENT (OUTPATIENT)
Age: 78
End: 2023-03-31

## 2023-03-31 ENCOUNTER — APPOINTMENT (OUTPATIENT)
Dept: ELECTROPHYSIOLOGY | Facility: CLINIC | Age: 78
End: 2023-03-31
Payer: MEDICARE

## 2023-03-31 PROCEDURE — 93296 REM INTERROG EVL PM/IDS: CPT

## 2023-03-31 PROCEDURE — 93295 DEV INTERROG REMOTE 1/2/MLT: CPT

## 2023-04-07 NOTE — H&P CARDIOLOGY - ALCOHOL USE HISTORY SINGLE SELECT
Patient is alert and oriented ,she was very pleasant and she readily smiled when greeted. she voiced that her abdominal incisions are sore. no complain of nausea. her incisions are open to air and approximated with surgical glue. 23:35 she was medicated with the 10 mg oxycodone for pain 7/10.    00:30 patient is resting quietly in bed with her eyes closed. her respirations were unlabored. 1:50 patient is sleeping.  3:20 patient is up and standing at the side of her bed,medicated her with oxycodone ,pain 7/10.    4:30 she is resting quietly in bed and she voiced that she feels better. her breathing is effortless. never

## 2023-04-10 NOTE — CHART NOTE - NSCHARTNOTESELECT_GEN_ALL_CORE
Post Procedure Check/Event Note Benzoyl Peroxide Pregnancy And Lactation Text: This medication is Pregnancy Category C. It is unknown if benzoyl peroxide is excreted in breast milk.

## 2023-04-17 ENCOUNTER — EMERGENCY (EMERGENCY)
Facility: HOSPITAL | Age: 78
LOS: 1 days | Discharge: HOME | End: 2023-04-17
Attending: EMERGENCY MEDICINE
Payer: MEDICARE

## 2023-04-17 ENCOUNTER — APPOINTMENT (OUTPATIENT)
Dept: SURGERY | Facility: CLINIC | Age: 78
End: 2023-04-17
Payer: MEDICARE

## 2023-04-17 VITALS
TEMPERATURE: 98 F | HEIGHT: 62 IN | RESPIRATION RATE: 18 BRPM | HEART RATE: 98 BPM | WEIGHT: 169.98 LBS | DIASTOLIC BLOOD PRESSURE: 73 MMHG | SYSTOLIC BLOOD PRESSURE: 115 MMHG | OXYGEN SATURATION: 96 %

## 2023-04-17 VITALS
OXYGEN SATURATION: 97 % | RESPIRATION RATE: 17 BRPM | TEMPERATURE: 98.7 F | HEART RATE: 96 BPM | DIASTOLIC BLOOD PRESSURE: 71 MMHG | SYSTOLIC BLOOD PRESSURE: 107 MMHG

## 2023-04-17 DIAGNOSIS — Z90.49 ACQUIRED ABSENCE OF OTHER SPECIFIED PARTS OF DIGESTIVE TRACT: Chronic | ICD-10-CM

## 2023-04-17 LAB
ALBUMIN SERPL ELPH-MCNC: 4 G/DL — SIGNIFICANT CHANGE UP (ref 3.3–5)
ALP SERPL-CCNC: 87 U/L — SIGNIFICANT CHANGE UP (ref 40–120)
ALT FLD-CCNC: 8 U/L — LOW (ref 10–45)
ANION GAP SERPL CALC-SCNC: 13 MMOL/L — SIGNIFICANT CHANGE UP (ref 5–17)
AST SERPL-CCNC: 13 U/L — SIGNIFICANT CHANGE UP (ref 10–40)
BASE EXCESS BLDV CALC-SCNC: 2 MMOL/L — SIGNIFICANT CHANGE UP (ref -2–3)
BASOPHILS # BLD AUTO: 0.04 K/UL — SIGNIFICANT CHANGE UP (ref 0–0.2)
BASOPHILS NFR BLD AUTO: 0.4 % — SIGNIFICANT CHANGE UP (ref 0–2)
BILIRUB SERPL-MCNC: 1 MG/DL — SIGNIFICANT CHANGE UP (ref 0.2–1.2)
BUN SERPL-MCNC: 14 MG/DL — SIGNIFICANT CHANGE UP (ref 7–23)
CA-I SERPL-SCNC: 1.2 MMOL/L — SIGNIFICANT CHANGE UP (ref 1.15–1.33)
CALCIUM SERPL-MCNC: 9.8 MG/DL — SIGNIFICANT CHANGE UP (ref 8.4–10.5)
CHLORIDE BLDV-SCNC: 102 MMOL/L — SIGNIFICANT CHANGE UP (ref 96–108)
CHLORIDE SERPL-SCNC: 101 MMOL/L — SIGNIFICANT CHANGE UP (ref 96–108)
CO2 BLDV-SCNC: 30 MMOL/L — HIGH (ref 22–26)
CO2 SERPL-SCNC: 26 MMOL/L — SIGNIFICANT CHANGE UP (ref 22–31)
CREAT SERPL-MCNC: 0.95 MG/DL — SIGNIFICANT CHANGE UP (ref 0.5–1.3)
EGFR: 62 ML/MIN/1.73M2 — SIGNIFICANT CHANGE UP
EOSINOPHIL # BLD AUTO: 0.02 K/UL — SIGNIFICANT CHANGE UP (ref 0–0.5)
EOSINOPHIL NFR BLD AUTO: 0.2 % — SIGNIFICANT CHANGE UP (ref 0–6)
FLUAV AG NPH QL: SIGNIFICANT CHANGE UP
FLUBV AG NPH QL: SIGNIFICANT CHANGE UP
GAS PNL BLDV: 137 MMOL/L — SIGNIFICANT CHANGE UP (ref 136–145)
GAS PNL BLDV: SIGNIFICANT CHANGE UP
GAS PNL BLDV: SIGNIFICANT CHANGE UP
GLUCOSE BLDV-MCNC: 94 MG/DL — SIGNIFICANT CHANGE UP (ref 70–99)
GLUCOSE SERPL-MCNC: 97 MG/DL — SIGNIFICANT CHANGE UP (ref 70–99)
HCO3 BLDV-SCNC: 28 MMOL/L — SIGNIFICANT CHANGE UP (ref 22–29)
HCT VFR BLD CALC: 37.1 % — SIGNIFICANT CHANGE UP (ref 34.5–45)
HCT VFR BLDA CALC: 37 % — SIGNIFICANT CHANGE UP (ref 34.5–46.5)
HGB BLD CALC-MCNC: 12.3 G/DL — SIGNIFICANT CHANGE UP (ref 11.7–16.1)
HGB BLD-MCNC: 12.1 G/DL — SIGNIFICANT CHANGE UP (ref 11.5–15.5)
IMM GRANULOCYTES NFR BLD AUTO: 0.2 % — SIGNIFICANT CHANGE UP (ref 0–0.9)
LACTATE BLDV-MCNC: 1.6 MMOL/L — SIGNIFICANT CHANGE UP (ref 0.5–2)
LIDOCAIN IGE QN: 7 U/L — SIGNIFICANT CHANGE UP (ref 7–60)
LYMPHOCYTES # BLD AUTO: 1.37 K/UL — SIGNIFICANT CHANGE UP (ref 1–3.3)
LYMPHOCYTES # BLD AUTO: 14.7 % — SIGNIFICANT CHANGE UP (ref 13–44)
MCHC RBC-ENTMCNC: 31.7 PG — SIGNIFICANT CHANGE UP (ref 27–34)
MCHC RBC-ENTMCNC: 32.6 GM/DL — SIGNIFICANT CHANGE UP (ref 32–36)
MCV RBC AUTO: 97.1 FL — SIGNIFICANT CHANGE UP (ref 80–100)
MONOCYTES # BLD AUTO: 1.04 K/UL — HIGH (ref 0–0.9)
MONOCYTES NFR BLD AUTO: 11.2 % — SIGNIFICANT CHANGE UP (ref 2–14)
NEUTROPHILS # BLD AUTO: 6.81 K/UL — SIGNIFICANT CHANGE UP (ref 1.8–7.4)
NEUTROPHILS NFR BLD AUTO: 73.3 % — SIGNIFICANT CHANGE UP (ref 43–77)
NRBC # BLD: 0 /100 WBCS — SIGNIFICANT CHANGE UP (ref 0–0)
PCO2 BLDV: 50 MMHG — HIGH (ref 39–42)
PH BLDV: 7.36 — SIGNIFICANT CHANGE UP (ref 7.32–7.43)
PLATELET # BLD AUTO: 185 K/UL — SIGNIFICANT CHANGE UP (ref 150–400)
PO2 BLDV: 26 MMHG — SIGNIFICANT CHANGE UP (ref 25–45)
POTASSIUM BLDV-SCNC: 3.8 MMOL/L — SIGNIFICANT CHANGE UP (ref 3.5–5.1)
POTASSIUM SERPL-MCNC: 3.8 MMOL/L — SIGNIFICANT CHANGE UP (ref 3.5–5.3)
POTASSIUM SERPL-SCNC: 3.8 MMOL/L — SIGNIFICANT CHANGE UP (ref 3.5–5.3)
PROT SERPL-MCNC: 7.3 G/DL — SIGNIFICANT CHANGE UP (ref 6–8.3)
RBC # BLD: 3.82 M/UL — SIGNIFICANT CHANGE UP (ref 3.8–5.2)
RBC # FLD: 14 % — SIGNIFICANT CHANGE UP (ref 10.3–14.5)
RSV RNA NPH QL NAA+NON-PROBE: SIGNIFICANT CHANGE UP
SAO2 % BLDV: 36.5 % — LOW (ref 67–88)
SARS-COV-2 RNA SPEC QL NAA+PROBE: SIGNIFICANT CHANGE UP
SODIUM SERPL-SCNC: 140 MMOL/L — SIGNIFICANT CHANGE UP (ref 135–145)
WBC # BLD: 9.3 K/UL — SIGNIFICANT CHANGE UP (ref 3.8–10.5)
WBC # FLD AUTO: 9.3 K/UL — SIGNIFICANT CHANGE UP (ref 3.8–10.5)

## 2023-04-17 PROCEDURE — G1004: CPT

## 2023-04-17 PROCEDURE — 36000 PLACE NEEDLE IN VEIN: CPT

## 2023-04-17 PROCEDURE — 74177 CT ABD & PELVIS W/CONTRAST: CPT | Mod: 26,MG

## 2023-04-17 PROCEDURE — 99285 EMERGENCY DEPT VISIT HI MDM: CPT | Mod: 25

## 2023-04-17 PROCEDURE — 99214 OFFICE O/P EST MOD 30 MIN: CPT

## 2023-04-17 RX ORDER — SODIUM CHLORIDE 9 MG/ML
1000 INJECTION INTRAMUSCULAR; INTRAVENOUS; SUBCUTANEOUS ONCE
Refills: 0 | Status: COMPLETED | OUTPATIENT
Start: 2023-04-17 | End: 2023-04-17

## 2023-04-17 RX ORDER — ACETAMINOPHEN 500 MG
1000 TABLET ORAL ONCE
Refills: 0 | Status: COMPLETED | OUTPATIENT
Start: 2023-04-17 | End: 2023-04-17

## 2023-04-17 RX ADMIN — Medication 400 MILLIGRAM(S): at 22:44

## 2023-04-17 RX ADMIN — SODIUM CHLORIDE 1000 MILLILITER(S): 9 INJECTION INTRAMUSCULAR; INTRAVENOUS; SUBCUTANEOUS at 22:18

## 2023-04-17 NOTE — ED PROVIDER NOTE - CLINICAL SUMMARY MEDICAL DECISION MAKING FREE TEXT BOX
Senthil, PGY2 - 78-year-old woman with prior perforated diverticulitis now post left hemicolectomy with colostomy, presenting due to worsening drainage from subcutaneous fistula sites.  Malodorous, high suspicion for infection.  Tenderness over the fistula sites but no other weakness.  Labs, CT with oral and IV contrast considering fistula presence.  Will speak with Dr. Alvarado. *The above represents an initial assessment/impression. Please refer to progress notes for potential changes in patient clinical course*

## 2023-04-17 NOTE — ED ADULT NURSE NOTE - OBJECTIVE STATEMENT
78y Female AOx4 with PMH of diverticulitis s/p surgery 2019, HTN , gout, DM presents to the ED c/o abdominal pain x1 week. Pt states ever since diverticulitis surgery, site has remained open. Now endorses increased purulent drainage and pain, 2cm site noted, new dressing applied. Ostomy site noted to right abdomen - denies any blood in drainage. Denies N/V, fever/chills, SOB, chest pain. Spontaneous/unlabored respirations, speaking in full sentences. Side rails up, bed in lowest position, oriented to call bell, safety maintained.

## 2023-04-17 NOTE — ED PROVIDER NOTE - PHYSICAL EXAMINATION
Gen: mild distress, AOx3, able to make needs known, non-toxic  Head: NCAT  HEENT: EOMI, normal conjunctiva  Lung: CTAB, no respiratory distress, no wheezes/rhonchi/rales B/L, speaking in full sentences  CV: RRR, +murmur, pulses bilaterally   Abd: soft, NTND, no guarding, no CVA tenderness. scar tissue over the periumbilical area. On the left side of the suprapubic region there are 2 fistula openings, each 1 approximately 1 cm in diameter.  Drainage is yellow-white in color.  Malodorous.  Wound cultures collected and sent.   MSK: no visible bony deformities  Neuro: No focal sensory or motor deficits  Skin: Warm, well perfused, no rash  Psych: normal affect

## 2023-04-17 NOTE — ED CLERICAL - NS ED CLERK NOTE PRE-ARRIVAL INFORMATION; ADDITIONAL PRE-ARRIVAL INFORMATION
CC/Reason For referral: Abdominal wall fistula with concern for infection, Will need CT and labs  Preferred Consultant(if applicable): N/A  Who admits for you (if needed): N/A  Do you have documents you would like to fax over? No  Would you still like to speak to an ED attending? Yes, please call Dr. Wilson after patient is seen

## 2023-04-17 NOTE — ED PROVIDER NOTE - NSICDXPASTSURGICALHX_GEN_ALL_CORE_FT
PAST SURGICAL HISTORY:  AICD (Automatic Cardioverter/Defibrillator) Present inserted in Aug, 2008. Due for battery change in 1 month. ( [employer]) . Inserted by Dr Duffy    S/P cholecystectomy     Status post left hemicolectomy

## 2023-04-17 NOTE — HISTORY OF PRESENT ILLNESS
[de-identified] : Pretty Dolan is a 79 y/o female S/P ex-lap and Lupe's procedure for perforated diverticulitis in July 2019 with abdomen left open, multiple washouts, then abdominal closure with Vicryl mesh (fascia left open) and skin graft over wound.  She has had a chronic low volume draining colocutaneous fistula.\par \par She presents today with complaints of worsening abdominal pain over the last 2 days with increased foul-smelling drainage from the fistula site.\par She does not report any fevers or chills.  She has normal appetite.  Her colostomy is functioning normally.  She does not report worsening shortness of breath or chest pain.

## 2023-04-17 NOTE — ED PROVIDER NOTE - ATTENDING CONTRIBUTION TO CARE
78-year-old with complicated history of diverticulitis resection with persistent drainage from 2 midline to left midline sites.  Patient without fever or chills.  Patient states that over the past week the site has been more painful, with increased drainage.  Patient with 2 punctate regions to midline to the left of midline around 5 cm apart that are draining seropurulent fluid, the sites are tender to touch, there is no rebound or guarding, patient is not tachycardic, patient is nontachypneic, patient has capacity  Jered Ojeda MD, FACEP: In this physician's medical judgement based on clinical history and physical exam the patient's signs and symptoms lead to differential diagnoses which includes but is not limited to: Abdominal wall infection  Labs were ordered and independently reviewed by me.  EKG was ordered and independently reviewed by me.  Imaging was ordered and reviewed by me.    Appropriate medications for the patient's presenting complaints were ordered, and effects were reassessed.    Patient's records including prior hospital visit, med and medical history were reviewed.  History assisted by daughter at bedside  Escalation to admission/observation was considered.    Will follow up on labs, therapeutics, imaging, reassess and disposition as clinically indicated.  *The above represents an initial assessment/impression. Please refer to my progress notes below for potential changes in patient clinical course*

## 2023-04-17 NOTE — ED PROCEDURE NOTE - PROCEDURE ADDITIONAL DETAILS
Emergency Department Focused Ultrasound performed at patient's bedside for placement of ultrasound guided IV. The study was confirmed with blood return and ease of flushing saline.    Upper extremity laterality: right   IV Gauge: 18

## 2023-04-17 NOTE — ED PROCEDURE NOTE - ATTENDING CONTRIBUTION TO CARE
***Jered Ojeda MD FACEP*** Attending physician was available for the key components of the procedure, the patient tolerated well. There were no complications with the procedure.

## 2023-04-17 NOTE — ED PROVIDER NOTE - NSFOLLOWUPINSTRUCTIONS_ED_ALL_ED_FT
You were seen in the ER for abdominal pain. Dr. Alvarado reviewed your results and recommends 10 days of antibiotics which were sent to your pharmacy.    Please follow up with Dr. Alvarado in 2 weeks.  Please follow up with your primary care doctor.    Please return to the ER if you have worsening symptoms including fever, chest pain, shortness of breath, abdominal pain, nausea, vomiting, diarrhea, weakness or lightheadedness/fainting.

## 2023-04-17 NOTE — ED PROVIDER NOTE - NSICDXPASTMEDICALHX_GEN_ALL_CORE_FT
PAST MEDICAL HISTORY:  Asthma     Atrial flutter     Cardiac Pacemaker     Cardiomyopathy     Congestive Heart Failure     Diabetes     Diverticulitis     Gout     HTN - Hypertension     Kidney stone     Patient is Congregation     Refusal of blood transfusions as patient is Congregation     Vertigo

## 2023-04-17 NOTE — ASSESSMENT
[FreeTextEntry1] : 78F s/p ex-lap and Lupe's procedure for perforated diverticulitis in July 2019 with abdomen left open, multiple washouts, then abdominal closure with vicryl mesh (fascia left open) and skin graft over wound.  She subsequently developed a rectal stump leak, with persistent low output colocutaneous fistula x2 which are well controlled.  \par \par She presents today with abdominal pain and increased drainage from the fistula sites, concerning for infection, possible abscess.\par \par \par \par \par

## 2023-04-17 NOTE — PLAN
[FreeTextEntry1] : [] Sent to ED for CT scan and labs\par [] Further treatment pending work-up\par [] Spoke to ED\par [] Discussed with patient and her daughter

## 2023-04-17 NOTE — PHYSICAL EXAM
[de-identified] : Well-appearing, appears stated age, no acute distress, uses wheelchair [de-identified] : Normocephalic, sclerae anicteric, mucous membranes moist [de-identified] : Normal respirations [de-identified] : Soft, nondistended.  Right upper quadrant colostomy appears normal with stool in bag.  Midline ventral incisional hernia is soft, reducible, with no overlying skin changes.  2 small fistula sites in lower abdomen with granulation tissue at their opening sites.  This site is tender to palpation with surrounding erythema.  No fluctuance or crepitus.  Some brownish tinged purulent discharge is noted.

## 2023-04-17 NOTE — ED PROVIDER NOTE - PATIENT PORTAL LINK FT
You can access the FollowMyHealth Patient Portal offered by [facility] by registering at the following website: [email or web address]. By joining [facility]’s FollowMyHealth portal, you will also be able to view your health information using other applications (apps) compatible with our system.

## 2023-04-17 NOTE — ED PROVIDER NOTE - OBJECTIVE STATEMENT
78-year-old woman with PMH COPD, CHF with ejection fraction 50-55%, pacemaker, asthma on Ventolin and Symbicort, diabetes type 2, presenting due to acute on chronic worsening of abdominal pain and subcutaneous fistula drainage over the last week.  Patient had a perforated diverticulitis in 2019, is now post left hemicolectomy with colostomy on the right.  Over the last week patient and daughter states that the fistula site appears to be more painful, and the drainage smells worse. Denies fevers, chest pain, SOB, nausea, vomiting. no changes to colostomy bag output.

## 2023-04-17 NOTE — ED PROVIDER NOTE - PROGRESS NOTE DETAILS
Senthil PGY2 - US IV placed. Senthil, PGY2 - Spoke with Dr. Wilson, requesting general surg consult. surgery paged. Senthil, PGY2 - Spoke with surgery, will come see patient. Senthil, PGY2 - Spoke with Dr. Alvarado, updated on patient status, requesting general surg consult.  surgery paged. Senthil, PGY2 - Called surgery, no definite plan as of yet Pt NAD. Pain improved with Tylenol. Pt mike. Surgery paged for final dispo. LUCY. Attending to meet with patient this AM prior to discharge. LUCY. Dr. Ambriz: Recd sign out from Dr. Browne. Pt p/w ?increased drainage from subcutaneous fistula, no fevers or chills. Seen by surgery resident overnight and awaiting attending Dr. Alvarado. Pt seen at bedside, currently no drainage, abdo soft/nt/nd. D/w Dr. Alvarado who will see pt shortly in the ED. Pt and daughter informed. Heather Ames- pt stable, Dr. Alvarado has seen pt and cleared for dc, rec 10d cipro, flagyl. sent prescriptions to pharmacy, discussed plan with pt and family who agree.

## 2023-04-18 VITALS
RESPIRATION RATE: 14 BRPM | OXYGEN SATURATION: 98 % | HEART RATE: 80 BPM | SYSTOLIC BLOOD PRESSURE: 103 MMHG | TEMPERATURE: 98 F | DIASTOLIC BLOOD PRESSURE: 66 MMHG

## 2023-04-18 PROCEDURE — G1004: CPT

## 2023-04-18 PROCEDURE — 99285 EMERGENCY DEPT VISIT HI MDM: CPT | Mod: 25

## 2023-04-18 PROCEDURE — 82330 ASSAY OF CALCIUM: CPT

## 2023-04-18 PROCEDURE — 87040 BLOOD CULTURE FOR BACTERIA: CPT

## 2023-04-18 PROCEDURE — 87070 CULTURE OTHR SPECIMN AEROBIC: CPT

## 2023-04-18 PROCEDURE — 84132 ASSAY OF SERUM POTASSIUM: CPT

## 2023-04-18 PROCEDURE — 80053 COMPREHEN METABOLIC PANEL: CPT

## 2023-04-18 PROCEDURE — 93005 ELECTROCARDIOGRAM TRACING: CPT

## 2023-04-18 PROCEDURE — 82947 ASSAY GLUCOSE BLOOD QUANT: CPT

## 2023-04-18 PROCEDURE — 85014 HEMATOCRIT: CPT

## 2023-04-18 PROCEDURE — 83690 ASSAY OF LIPASE: CPT

## 2023-04-18 PROCEDURE — 85018 HEMOGLOBIN: CPT

## 2023-04-18 PROCEDURE — 74177 CT ABD & PELVIS W/CONTRAST: CPT | Mod: MG

## 2023-04-18 PROCEDURE — 83605 ASSAY OF LACTIC ACID: CPT

## 2023-04-18 PROCEDURE — 82435 ASSAY OF BLOOD CHLORIDE: CPT

## 2023-04-18 PROCEDURE — 87077 CULTURE AEROBIC IDENTIFY: CPT

## 2023-04-18 PROCEDURE — 82803 BLOOD GASES ANY COMBINATION: CPT

## 2023-04-18 PROCEDURE — 96374 THER/PROPH/DIAG INJ IV PUSH: CPT | Mod: XU

## 2023-04-18 PROCEDURE — 84295 ASSAY OF SERUM SODIUM: CPT

## 2023-04-18 PROCEDURE — 82962 GLUCOSE BLOOD TEST: CPT

## 2023-04-18 PROCEDURE — 85025 COMPLETE CBC W/AUTO DIFF WBC: CPT

## 2023-04-18 PROCEDURE — 87637 SARSCOV2&INF A&B&RSV AMP PRB: CPT

## 2023-04-18 PROCEDURE — 87186 SC STD MICRODIL/AGAR DIL: CPT

## 2023-04-18 PROCEDURE — 36415 COLL VENOUS BLD VENIPUNCTURE: CPT

## 2023-04-18 PROCEDURE — 96375 TX/PRO/DX INJ NEW DRUG ADDON: CPT

## 2023-04-18 RX ORDER — CIPROFLOXACIN LACTATE 400MG/40ML
400 VIAL (ML) INTRAVENOUS ONCE
Refills: 0 | Status: COMPLETED | OUTPATIENT
Start: 2023-04-18 | End: 2023-04-18

## 2023-04-18 RX ORDER — METRONIDAZOLE 500 MG
500 TABLET ORAL ONCE
Refills: 0 | Status: COMPLETED | OUTPATIENT
Start: 2023-04-18 | End: 2023-04-18

## 2023-04-18 RX ORDER — METRONIDAZOLE 500 MG
1 TABLET ORAL
Qty: 30 | Refills: 0
Start: 2023-04-18 | End: 2023-04-27

## 2023-04-18 RX ORDER — METRONIDAZOLE 500 MG
1 TABLET ORAL
Qty: 20 | Refills: 0
Start: 2023-04-18 | End: 2023-04-27

## 2023-04-18 RX ORDER — CIPROFLOXACIN LACTATE 400MG/40ML
1 VIAL (ML) INTRAVENOUS
Qty: 20 | Refills: 0
Start: 2023-04-18 | End: 2023-04-27

## 2023-04-18 RX ADMIN — Medication 200 MILLIGRAM(S): at 00:49

## 2023-04-18 RX ADMIN — Medication 100 MILLIGRAM(S): at 02:30

## 2023-04-18 NOTE — ED ADULT NURSE REASSESSMENT NOTE - NS ED NURSE REASSESS COMMENT FT1
Patient's daughter provided with new ostomy bags after received from [facility], and supplies to clean site.

## 2023-04-18 NOTE — CONSULT NOTE ADULT - ASSESSMENT
78F with PMH DM2, HTN, COPD, Aflutter on Eliquis, HF with AICD and PSH of Lupe procedure for perforated diverticulitis in July 2019 with multiple washouts in the OR (c/b EC fistula) and eventual closure with vicryl bridging mesh and skin graft who presented to the ER with 1 of increased drainage from known EC fistula aw pain. Surgery consulted for evaluation. Labs and vitals without evidence of infection. CT A/P without acute findings, largely unchanged air/fluid collection in abdominal wall compared with prior imaging. Exam with mild TTP and small erythema surrounding ECF.     Plan:   -No acute surgical intervention   -May require course of PO abx   -Await final recs from Dr. Palmer    Discussed with Attending Surgeon Dr. Spencer Duron MD PGY2  [place] Team, [id number]

## 2023-04-18 NOTE — CONSULT NOTE ADULT - ATTENDING COMMENTS
Patient seen and examined in ED.  Hx kal's with open abd, now with chronic stump leak with fistula, here with increased drainage from the fistula with pain.  On exam, some mucoid-purulent drainage with pain on palpation.  Mild erythema surrounding the fistula.  CT without new intraabdominal processes.  Labs and vitals reviewed.    - home on PO cipro/flagyl  - cont wound care  - follow up with Dr. Alvarado in 2 weeks  - call with fevers, chills, change in drainage    All questions answered.    Carlyn Palmer MD

## 2023-04-18 NOTE — ED ADULT NURSE REASSESSMENT NOTE - NS ED NURSE REASSESS COMMENT FT1
Patient requested new colostomy bag, but unavailable in ED. Call made by ED RN to [facility] to have new bags sent to ED.

## 2023-04-18 NOTE — CONSULT NOTE ADULT - SUBJECTIVE AND OBJECTIVE BOX
SURGERY CONSULT NOTE  --------------------------------------------------------------------------------------------    Patient is a 78y old  Female who presents with a chief complaint of drainage from abdominal wound    HPI: 78F with PMH DM2, HTN, COPD, Aflutter on Eliquis, HF with AICD and PSH of Lupe procedure for perforated diverticulitis in July 2019 with multiple washouts in the OR (c/b EC fistula) and eventual closure with vicryl bridging mesh and skin graft who presented to the ER with 1 of increased drainage from ECF aw pain. Reports that drainage is typically minimal from EC fistula, usually only requires 2-3 dressing changes per day. Daughter reports that over last 24 hours dressing has required more frequent changes and patient complains of pain around EC fistula site. Denies changes in consistency of drainage. Denies fevers, chills, CP, SOB, dysuria, bloody ostomy output. Surgery consulted for evaluation.     In ED, patient is HDS. Afebrile. Labs unremarkable. CT A/P without any acute findings.         PAST MEDICAL & SURGICAL HISTORY:  Asthma      Congestive Heart Failure      Diabetes      Gout      HTN - Hypertension      Cardiac Pacemaker      Kidney stone      Cardiomyopathy      Diverticulitis      Atrial flutter      Vertigo      Patient is Yazidi      Refusal of blood transfusions as patient is Yazidi      AICD (Automatic Cardioverter/Defibrillator) Present  inserted in Aug, 2008. Due for battery change in 1 month. ( [employer]) . Inserted by Dr Duffy      S/P cholecystectomy      Status post left hemicolectomy        FAMILY HISTORY:  Family history of brain tumor (Father)      [] Family history not pertinent as reviewed with the patient and family    ALLERGIES: digoxin (Other; Short breath (Mild to Mod))  Coreg (Other)  metoprolol (Other)  Solu-Medrol (Other (Mild to Mod))  penicillins (Hives)      CURRENT MEDICATIONS  MEDICATIONS (STANDING):   MEDICATIONS (PRN):  --------------------------------------------------------------------------------------------    Vitals:   T(C): 36.7 (04-18-23 @ 05:04), Max: 36.9 (04-17-23 @ 19:21)  HR: 67 (04-18-23 @ 05:04) (67 - 98)  BP: 117/67 (04-18-23 @ 05:04) (98/54 - 133/78)  RR: 15 (04-18-23 @ 05:04) (13 - 19)  SpO2: 98% (04-18-23 @ 05:04) (96% - 100%)  CAPILLARY BLOOD GLUCOSE      POCT Blood Glucose.: 81 mg/dL (18 Apr 2023 04:52)    CAPILLARY BLOOD GLUCOSE      POCT Blood Glucose.: 81 mg/dL (18 Apr 2023 04:52)      Height (cm): 157.5 (04-17 @ 16:01)  Weight (kg): 77.1 (04-17 @ 16:01)  BMI (kg/m2): 31.1 (04-17 @ 16:01)  BSA (m2): 1.78 (04-17 @ 16:01)    PHYSICAL EXAM:   General: Alert, NAD  Neuro: A+Ox3  HEENT: NC/AT, no asymmetry, no scleral icterus  Neck: Soft, supple  Cardio: RRR   Resp: Airway patent, unlabored breathing  Thorax: No chest wall tenderness  GI/Abd: Soft, ND, colostomy with air/stool, LLQ punctate opening x2 with mucosal tissue, minimal to no drainage, no purulent output, sub centimeter area of erythema surrounding medial opening, no fluctuance or significant induration, mild TTP  Vascular: All 4 extremities warm   Skin: Intact, no breakdown  Musculoskeletal: All 4 extremities moving spontaneously, no limitations  --------------------------------------------------------------------------------------------    LABS  CBC (04-17 @ 22:14)                              12.1                           9.30    )----------------(  185        73.3  % Neutrophils, 14.7  % Lymphocytes, ANC: 6.81                                37.1      BMP (04-17 @ 22:14)             140     |  101     |  14    		Ca++ --      Ca 9.8                ---------------------------------( 97    		Mg --                 3.8     |  26      |  0.95  			Ph --        LFTs (04-17 @ 22:14)      TPro 7.3 / Alb 4.0 / TBili 1.0 / DBili -- / AST 13 / ALT 8<L> / AlkPhos 87          VBG (04-17 @ 21:45)     7.36 / 50<H> / 26 / 28 / 2.0 / 36.5<L>%     Lactate: 1.6    --------------------------------------------------------------------------------------------    MICROBIOLOGY      --------------------------------------------------------------------------------------------    IMAGING  < from: CT Abdomen and Pelvis w/ Oral Cont and w/ IV Cont (04.17.23 @ 23:51) >    ACC: [id number] EXAM:  CT ABDOMEN AND PELVIS OC IC   ORDERED BY: TAMANNA GO     PROCEDURE DATE:  04/17/2023          INTERPRETATION:  CLINICAL INFORMATION: Lower abdominal pain for one week.   History of perforated diverticulitis and chronic abdominal fistulas.    COMPARISON: CT abdomen and pelvis 11/19/2022    CONTRAST/COMPLICATIONS:  IV Contrast: Omnipaque 350  90 cc administered   10 cc discarded  Oral Contrast: Omnipaque 300  Complications: None reported at time of study completion    PROCEDURE:  CT of the Abdomen and Pelvis was performed.  Sagittal and coronal reformats were performed.    FINDINGS:  LOWER CHEST: Linear atelectasis/scarring in the right lung base.   Partially visualized cardiac device leads.    LIVER: Within normal limits.  BILE DUCTS: Stable caliber, likely secondary to postcholecystectomy state.  GALLBLADDER: Cholecystectomy.  SPLEEN: Within normal limits.  PANCREAS: Within normal limits.  ADRENALS: Within normal limits.  KIDNEYS/URETERS: Multiple nonobstructingrenal calculi in the left   kidney, measuring up to 8 mm in the lower pole. No hydronephrosis.    BLADDER: Within normal limits.  REPRODUCTIVE ORGANS: Calcified fibroid uterus. The adnexa are within   normal limits.    BOWEL: Status post Delgado's procedure with right upper quadrant   colostomy and rectosigmoid stump. Diverticula within the rectosigmoid   stump. No bowel obstruction. Appendix is normal.  PERITONEUM: No ascites.  VESSELS: Atherosclerotic changes.  RETROPERITONEUM/LYMPH NODES: No lymphadenopathy.  ABDOMINAL WALL: Diastases recti and large midline ventral abdominal   hernia is redemonstrated containing portions of the stomach, left hepatic   lobe, small and large bowel. A small air-containing fluid collection in   the left lowerventral abdominal wall medially along the hernia site   which extends to the skin surface is unchanged. These likely represent   patient's known chronic abdominal fistulas. No extravasation of contrast   into the collection or ventral abdominal wall to suggest enterocutaneous   fistula.  BONES: Degenerative changes.    IMPRESSION:  No significant interval changes compared to CT abdomen and pelvis   11/19/2022. No evidence of diverticulitis.  Small air-containing fluid collection in the left lowerventral abdominal   wall along the hernia, not significantly changed. No extravasation of   enteric contrast.    --- End of Report ---           VASHTI GONZALEZ MD; Resident Radiologist  This document has been electronically signed.  MIKALA ALMONTE; Attending Radiologist  This document has been electronically signed. Apr 18 2023  2:05AM    < end of copied text >

## 2023-04-20 LAB
-  AMIKACIN: SIGNIFICANT CHANGE UP
-  AMIKACIN: SIGNIFICANT CHANGE UP
-  AMOXICILLIN/CLAVULANIC ACID: SIGNIFICANT CHANGE UP
-  AMOXICILLIN/CLAVULANIC ACID: SIGNIFICANT CHANGE UP
-  AMPICILLIN/SULBACTAM: SIGNIFICANT CHANGE UP
-  AMPICILLIN/SULBACTAM: SIGNIFICANT CHANGE UP
-  AMPICILLIN: SIGNIFICANT CHANGE UP
-  AMPICILLIN: SIGNIFICANT CHANGE UP
-  AZTREONAM: SIGNIFICANT CHANGE UP
-  AZTREONAM: SIGNIFICANT CHANGE UP
-  CEFAZOLIN: SIGNIFICANT CHANGE UP
-  CEFAZOLIN: SIGNIFICANT CHANGE UP
-  CEFEPIME: SIGNIFICANT CHANGE UP
-  CEFEPIME: SIGNIFICANT CHANGE UP
-  CEFOXITIN: SIGNIFICANT CHANGE UP
-  CEFOXITIN: SIGNIFICANT CHANGE UP
-  CEFTRIAXONE: SIGNIFICANT CHANGE UP
-  CEFTRIAXONE: SIGNIFICANT CHANGE UP
-  CIPROFLOXACIN: SIGNIFICANT CHANGE UP
-  CIPROFLOXACIN: SIGNIFICANT CHANGE UP
-  ERTAPENEM: SIGNIFICANT CHANGE UP
-  ERTAPENEM: SIGNIFICANT CHANGE UP
-  GENTAMICIN: SIGNIFICANT CHANGE UP
-  GENTAMICIN: SIGNIFICANT CHANGE UP
-  IMIPENEM: SIGNIFICANT CHANGE UP
-  IMIPENEM: SIGNIFICANT CHANGE UP
-  LEVOFLOXACIN: SIGNIFICANT CHANGE UP
-  LEVOFLOXACIN: SIGNIFICANT CHANGE UP
-  MEROPENEM: SIGNIFICANT CHANGE UP
-  MEROPENEM: SIGNIFICANT CHANGE UP
-  PIPERACILLIN/TAZOBACTAM: SIGNIFICANT CHANGE UP
-  PIPERACILLIN/TAZOBACTAM: SIGNIFICANT CHANGE UP
-  TOBRAMYCIN: SIGNIFICANT CHANGE UP
-  TOBRAMYCIN: SIGNIFICANT CHANGE UP
-  TRIMETHOPRIM/SULFAMETHOXAZOLE: SIGNIFICANT CHANGE UP
-  TRIMETHOPRIM/SULFAMETHOXAZOLE: SIGNIFICANT CHANGE UP
METHOD TYPE: SIGNIFICANT CHANGE UP
METHOD TYPE: SIGNIFICANT CHANGE UP

## 2023-04-20 NOTE — ED POST DISCHARGE NOTE - DETAILS
4/20: on cipro/flagyl continue to follow culture - Olga Swift PA-C 4/21: final results E.coli resistant to cipro, no sens for flagyl, and only other PO abx choice is cephalosporins. Discussed results with patient and pt's daughter, reports pt with reaction of hives to PCN. Informed pt and daughter of chance of cross-reactivity to cephs, and advised to d/c abx and return to ER for eval should any symptoms of allergic reaction occur. Rx sent for cefdinir. Pt has f/u with her surgeon. - Jennifer Spann PA-C

## 2023-04-20 NOTE — ED POST DISCHARGE NOTE - NS ED POST DC CALL 2
Patient contacted Notification Instructions: Patient will be notified of biopsy results. However, patient instructed to call the office if not contacted within 2 weeks.

## 2023-04-21 RX ORDER — CEFDINIR 250 MG/5ML
1 POWDER, FOR SUSPENSION ORAL
Qty: 20 | Refills: 0
Start: 2023-04-21 | End: 2023-04-30

## 2023-04-23 LAB
CULTURE RESULTS: SIGNIFICANT CHANGE UP
ORGANISM # SPEC MICROSCOPIC CNT: SIGNIFICANT CHANGE UP
SPECIMEN SOURCE: SIGNIFICANT CHANGE UP

## 2023-05-01 ENCOUNTER — APPOINTMENT (OUTPATIENT)
Dept: SURGERY | Facility: CLINIC | Age: 78
End: 2023-05-01
Payer: MEDICARE

## 2023-05-01 VITALS
WEIGHT: 160 LBS | OXYGEN SATURATION: 98 % | TEMPERATURE: 97.8 F | HEIGHT: 62 IN | HEART RATE: 87 BPM | RESPIRATION RATE: 18 BRPM | DIASTOLIC BLOOD PRESSURE: 79 MMHG | SYSTOLIC BLOOD PRESSURE: 89 MMHG | BODY MASS INDEX: 29.44 KG/M2

## 2023-05-01 PROCEDURE — 99213 OFFICE O/P EST LOW 20 MIN: CPT

## 2023-05-01 NOTE — ASSESSMENT
[FreeTextEntry1] : 78F s/p ex-lap and Lupe's procedure for perforated diverticulitis in July 2019 with abdomen left open, multiple washouts, then abdominal closure with vicryl mesh (fascia left open) and skin graft over wound.  She subsequently developed a rectal stump leak, with persistent low output colocutaneous fistula x2 which are well controlled.  \par \par Recent localized skin infection has been treated with antibiotics, and she is responding appropriately.\par \par \par \par \par

## 2023-05-01 NOTE — HISTORY OF PRESENT ILLNESS
[de-identified] : Oswaldo Dolan is a 79 y/o female S/P ex-lap and Lupe's procedure for perforated diverticulitis in July 2019 with abdomen left open, multiple washouts, then abdominal closure with Vicryl mesh (fascia left open) and skin graft over wound. She has had a chronic low volume draining colocutaneous fistula.\par \par She was seen on 4-17-23 with complaints of worsening abdominal pain over the last 2 days with increased foul-smelling drainage from the fistula site and was sent to the ED for evaluation.  CT scan demonstrated no fluid collection.  She was treated with antibiotics, and is just finishing her last few doses.\par \par She reports no fevers or chills.  She reports pain is significantly improved.  The drainage resolved, however 2 days ago it began again.\par

## 2023-05-01 NOTE — PLAN
[FreeTextEntry1] : Continue local wound care and dressing changes\par Patient is requesting limited pain medication, as she is having discomfort at the fistula site, particularly with dressing changes.\par She will follow-up with me in 2 weeks

## 2023-05-01 NOTE — PHYSICAL EXAM
[de-identified] : Well-appearing, appears stated age, no acute distress, uses wheelchair [de-identified] : Normocephalic, sclerae anicteric, mucous membranes moist [de-identified] : Normal respirations [de-identified] : Soft, nondistended.  Right upper quadrant colostomy appears normal with stool in bag.  Midline ventral incisional hernia is soft, reducible, with no overlying skin changes.  2 small fistula sites in lower abdomen with granulation tissue at their opening sites.  Thin fibrinous discharge is noted.  There is no surrounding erythema or induration.

## 2023-05-02 NOTE — ED ADULT TRIAGE NOTE - ACCOMPANIED BY
Self
Abdomen soft, non-tender and non-distended, no rebound, no guarding and no masses. no hepatosplenomegaly.
02-May-2023 15:18

## 2023-05-10 NOTE — ED PROVIDER NOTE - NS ED MD DISPO DIVISION
What Type Of Note Output Would You Prefer (Optional)?: Standard Output
How Severe Is Your Skin Lesion?: mild
Has Your Skin Lesion Been Treated?: not been treated
Is This A New Presentation, Or A Follow-Up?: Skin Lesion
[facility]

## 2023-05-15 ENCOUNTER — APPOINTMENT (OUTPATIENT)
Dept: SURGERY | Facility: CLINIC | Age: 78
End: 2023-05-15
Payer: MEDICARE

## 2023-05-15 VITALS
OXYGEN SATURATION: 98 % | RESPIRATION RATE: 17 BRPM | HEART RATE: 100 BPM | TEMPERATURE: 98 F | SYSTOLIC BLOOD PRESSURE: 126 MMHG | DIASTOLIC BLOOD PRESSURE: 70 MMHG

## 2023-05-15 DIAGNOSIS — Z93.3 COLOSTOMY STATUS: ICD-10-CM

## 2023-05-15 DIAGNOSIS — K63.2 FISTULA OF INTESTINE: ICD-10-CM

## 2023-05-15 PROCEDURE — 99213 OFFICE O/P EST LOW 20 MIN: CPT

## 2023-05-15 NOTE — HISTORY OF PRESENT ILLNESS
[de-identified] : The patient returns for follow-up after recent antibiotic treatment of chronic colocutaneous fistula with superficial cellulitis of the abdominal wall.\par She is doing well, reporting no fevers.  The drainage from the fistula has decreased and it is less foul-smelling.  She reports normal colostomy function.\par

## 2023-05-15 NOTE — PHYSICAL EXAM
[Obese, well nourished, in no acute distress] : obese, well nourished, in no acute distress [Normal] : affect appropriate [de-identified] : normal respirations [de-identified] : Soft, nontender, nondistended.  Lower abdominal fistula sites are clean, with small amount of drainage staining the dressings.  No erythema.

## 2023-05-15 NOTE — PLAN
[FreeTextEntry1] : Continue local dressing changes, which her daughter has been doing very well for some time.\par Follow-up with me as needed.

## 2023-05-15 NOTE — ASSESSMENT
[FreeTextEntry1] : 78F s/p ex-lap and Lupe's procedure for perforated diverticulitis in July 2019 with abdomen left open, multiple washouts, then abdominal closure with vicryl mesh (fascia left open) and skin graft over wound.  She subsequently developed a rectal stump leak, with persistent low output colocutaneous fistula x2 which are well controlled.  \par \par Recent localized skin infection has been treated with antibiotics, and she has responded well.\par \par \par \par \par

## 2023-05-26 NOTE — ED ADULT NURSE NOTE - NSSISCREENINGQ3_ED_A_ED
Teaching Attestation:    I have discussed the history, exam and medical decision making with Sosa Pak DO.      Xavier Vu MD   No

## 2023-06-01 NOTE — PATIENT PROFILE ADULT - FALL HARM RISK
Taltz Counseling: I discussed with the patient the risks of ixekizumab including but not limited to immunosuppression, serious infections, worsening of inflammatory bowel disease and drug reactions.  The patient understands that monitoring is required including a PPD at baseline and must alert us or the primary physician if symptoms of infection or other concerning signs are noted. other

## 2023-06-09 ENCOUNTER — NON-APPOINTMENT (OUTPATIENT)
Age: 78
End: 2023-06-09

## 2023-06-14 ENCOUNTER — RESULT CHARGE (OUTPATIENT)
Age: 78
End: 2023-06-14

## 2023-06-14 NOTE — CONSULT NOTE ADULT - ATTENDING COMMENTS
Hospitalist team will follow.    Robert Dee MD, MHA, FACP, [facility]  Pager: [phone]  If no response or off-hours, page [phone] Performed Resulted

## 2023-06-15 ENCOUNTER — NON-APPOINTMENT (OUTPATIENT)
Age: 78
End: 2023-06-15

## 2023-06-15 ENCOUNTER — APPOINTMENT (OUTPATIENT)
Dept: ELECTROPHYSIOLOGY | Facility: CLINIC | Age: 78
End: 2023-06-15
Payer: MEDICARE

## 2023-06-15 VITALS — OXYGEN SATURATION: 98 % | HEART RATE: 80 BPM | SYSTOLIC BLOOD PRESSURE: 124 MMHG | DIASTOLIC BLOOD PRESSURE: 74 MMHG

## 2023-06-15 DIAGNOSIS — I42.8 OTHER CARDIOMYOPATHIES: ICD-10-CM

## 2023-06-15 PROCEDURE — 93000 ELECTROCARDIOGRAM COMPLETE: CPT | Mod: 59

## 2023-06-15 PROCEDURE — 93284 PRGRMG EVAL IMPLANTABLE DFB: CPT

## 2023-06-25 NOTE — H&P ADULT - NSICDXFAMILYHX_GEN_ALL_CORE_FT
[facility]    Hospitalist Progress Note  Name: Lara Melendez    MRN: [id number]  Provider:  Kody Prieto DO MPH  Date of Service: 06/25/2023    Summary of Stay: Lara Melendez is a 61 year old female with PMH including COPD, emphysema, as needed 2-3 L O2 at home, tobacco dependence, hypertension, anxiety, paroxysmal atrial fibrillation on Eliquis, hypothyroidism, psoriasis, and hyperlipidemia who presented on 6/21 with worsening shortness of breath and wheezing.  Symptom onset was a few days prior to presentation and consisting of wheezing so her pulmonologist prescribed azithromycin and prednisone.  She took about 3 days of these medications.  Due to continued decline she called EMS and was brought to the ED for evaluation.    In the emergency department she had a clear COPD exacerbation so was given IV steroids, IV magnesium, multiple nebulizers and placed on BiPAP.  Imaging showed no clear evidence of pneumonia but she remained on azithromycin.  She was admitted to the ICU.    The day following admission her respiratory condition deteriorated to the point that she required intubation and mechanical ventilation.  She had significant bronchospasm and high airway pressures so required deep sedation with fentanyl, ketamine and propofol with addition of vecuronium.  She was given a continuous albuterol nebulizer with not much improvement.  Pulmonology was consulted as well.  The case was even discussed with [facility] and she is not an ECMO candidate.    Over the past 48 hours she is showing some clinical improvement with decreasing airway pressures and improved minute volumes.    Problem List:   1. Acute hypoxic respiratory failure secondary to COPD exacerbation: Initially was on BiPAP following admission but she deteriorated so was intubated 6/21.  She developed high airway pressures and bronchospasm so required deep sedation with fentanyl, ketamine, and propofol.  Vecuronium was added for  increased chest compliance.  Continuous albuterol nebulizer for 24 hours provided no benefit has been discontinued.  Continue ventilator settings per intensivist and pulmonology.  Possibly coming off vecuronium today.  Currently on IV methylprednisolone, scheduled nebulizers, and azithromycin.  Dr. Ovalles discussed the case with [facility] on she is not an ECMO candidate.  2. Paroxysmal atrial fibrillation: Currently rate controlled.  Continue diltiazem and Eliquis for stroke prophylaxis.  3. Hyperkalemia: Potassium 5.5 since yesterday and has received 2 doses of Lokelma with no change.  Continue scheduled Lokelma and serial potassium levels.  4. Steroid-induced hyperglycemia: Continue medium sliding scale insulin.  5. Lactic acidosis: I suspect this is due to nebulizers and hypoxia.    6. Anxiety: Holding prior to admission clonazepam.  Continue paroxetine and as needed IV lorazepam.  7. Hypertension: Hold prior to admission losartan.  8. Tobacco dependence: Nicotine replacement.  9. Malnutrition: RD consulted for of tube feeds and she has residuals of about 30.    DVT Prophylaxis: DOAC  Code Status: Full Code  Diet: NPO for Medical/Clinical Reasons Except for: Meds  Adult Formula Drip Feeding: Continuous Vital High Protein; Orogastric tube; Goal Rate: 15; mL/hr    Hines Catheter: PRESENT, indication: Deep Sedation/Paralysis  Disposition: Expected discharge in 3+ days to TBD. Goals prior to discharge include manage ICU issues.   Incidental Findings: As above.  Family updated today: Dr Ovalles updated family.    40 MINUTES SPENT BY ME on the date of service doing chart review, history, exam, documentation & further activities per the note.      Interval History   Intubated, sedated.  Overnight events reviewed.  Discussed with intensivist, RT, and RN.    -Data reviewed today: I personally reviewed all new labs and imaging results over the last 24 hours.     Physical Exam   Temp: 98.8  F (37.1  C) Temp src: Axillary BP:  (!) 147/69 Pulse: 80   Resp: 20 SpO2: 95 % O2 Device: Mechanical Ventilator    Vitals:    06/23/23 0345 06/24/23 0600 06/25/23 0530   Weight: 67.7 kg (149 lb 4 oz) 73.7 kg (162 lb 7.7 oz) 77 kg (169 lb 12.1 oz)     Vital Signs with Ranges  Temp:  [97  F (36.1  C)-98.8  F (37.1  C)] 98.8  F (37.1  C)  Pulse:  [] 80  Resp:  [18-20] 20  BP: (136-147)/(65-69) 147/69  MAP:  [74 mmHg-98 mmHg] 85 mmHg  Arterial Line BP: (117-154)/(53-71) 132/63  FiO2 (%):  [40 %-50 %] 45 %  SpO2:  [89 %-99 %] 95 %  I/O last 3 completed shifts:  In: 3716.22 [I.V.:2681.22; NG/GT:675]  Out: 2975 [Urine:2975]    GENERAL: No apparent distress. Intubated, sedated.  HEENT: Normocephalic, atraumatic.   CARDIOVASCULAR: Regular rate and rhythm without murmurs or rubs. No S3.  PULMONARY: Coarse wheezing bilaterally.  GASTROINTESTINAL: Soft, non-tender, non-distended. Bowel sounds normoactive.   EXTREMITIES: No cyanosis or clubbing. No edema.  NEUROLOGICAL: Sedated.  DERMATOLOGICAL: No rash, ulcer, bruising, nor jaundice.     Medications     fentaNYL 175 mcg/hr (06/25/23 0745)     ketamine 53.6 mg/hr (06/25/23 0745)     lactated ringers 100 mL/hr at 06/25/23 0800     propofol 20 mcg/kg/min (06/25/23 0745)    And     - MEDICATION INSTRUCTIONS -       norepinephrine Stopped (06/24/23 0134)     - MEDICATION INSTRUCTIONS -       vecuronium (NORCURON) 1 mg/mL in D5W 50 mL 1.1 mcg/kg/min (06/25/23 0745)       apixaban ANTICOAGULANT  5 mg Per Feeding Tube BID     artificial tears   Both Eyes Q8H     atorvastatin  20 mg Per Feeding Tube Daily     diltiazem  60 mg Oral or Feeding Tube Q6H ELZBIETA     insulin aspart  1-6 Units Subcutaneous Q4H     ipratropium  0.5 mg Nebulization 4x daily     levalbuterol  0.63 mg Nebulization 4x Daily     levothyroxine  112 mcg Per Feeding Tube Daily     methylPREDNISolone  125 mg Intravenous BID     multivitamins w/minerals  15 mL Per Feeding Tube Daily     nicotine  1 patch Transdermal Daily     nicotine   Transdermal Q8H      pantoprazole  40 mg Per Feeding Tube Daily     PARoxetine  20 mg Per Feeding Tube Daily     protein modular  1 packet Per Feeding Tube BID     sodium chloride (PF)  10-40 mL Intracatheter Q8H     sodium chloride (PF)  3 mL Intracatheter Q8H     sodium zirconium cyclosilicate  10 g Oral TID     Data     Laboratory:  Recent Labs   Lab 06/25/23  0357 06/24/23  0407 06/23/23  2207 06/23/23  1351 06/23/23  0550   WBC 10.7 12.8*  --   --  14.2*   HGB 10.2* 9.8* 10.7*   < > 10.8*  10.8*   HCT 32.2* 30.9*  --   --  35.1   * 105*  --   --  107*    171  --   --  205    < > = values in this interval not displayed.     Recent Labs   Lab 06/25/23  0758 06/25/23  0358 06/25/23  0357 06/25/23  0034 06/24/23  2211 06/24/23  1526 06/24/23  1400 06/24/23  0751 06/24/23  0407 06/23/23  0744 06/23/23  0550   NA  --   --  139  --   --   --   --   --  137  --  137   POTASSIUM  --   --  5.6*  --  5.5*  --  5.5*   < > 5.5*  --  4.3   CHLORIDE  --   --  101  --   --   --   --   --  101  --  99   CO2  --   --  32*  --   --   --   --   --  31*  --  31*   ANIONGAP  --   --  6*  --   --   --   --   --  5*  --  7   * 152* 158*   < >  --    < >  --    < > 171*   < > 174*   BUN  --   --  48.2*  --   --   --   --   --  39.1*  --  20.0   CR  --   --  0.87  --   --   --   --   --  0.97*  --  0.97*   GFRESTIMATED  --   --  76  --   --   --   --   --  67  --  67   EDIN  --   --  8.9  --   --   --   --   --  8.5*  --  8.7    < > = values in this interval not displayed.     No results for input(s): CULT in the last 168 hours.    Imaging:  No results found for this or any previous visit (from the past 24 hour(s)).      Kody Prieto DO MPH  Count includes the [facility] Hospitalist  [address] [place] [address]  06/25/2023    FAMILY HISTORY:  Family history of brain tumor

## 2023-06-26 NOTE — PATIENT PROFILE ADULT. - FUNCTIONAL SCREEN CURRENT LEVEL: AMBULATION, MLM
Subjective:       Patient ID: Corina Liu is a 39 y.o. female.    Chief Complaint: Medication Refill and Back Pain    Follow up.   Back pain is significant and severe. Not doing well off the opiates for back pain. Not sleeping.     Starting grief counseling with  today. Meeting this afternoon. Still with significant grief symptoms notably.     Review of Systems   Constitutional:  Negative for activity change, appetite change, fatigue and fever.   Respiratory:  Negative for shortness of breath.    Gastrointestinal:  Negative for abdominal pain.   Musculoskeletal:  Positive for back pain.   Integumentary:  Negative for rash.   Psychiatric/Behavioral:  Positive for dysphoric mood.        Objective:      Physical Exam  Vitals and nursing note reviewed.   Constitutional:       General: She is not in acute distress.     Appearance: She is not ill-appearing.   Cardiovascular:      Rate and Rhythm: Regular rhythm.      Heart sounds: No murmur heard.  Pulmonary:      Effort: Pulmonary effort is normal.      Breath sounds: Normal breath sounds. No wheezing.   Skin:     General: Skin is warm and dry.      Findings: No rash.   Neurological:      Mental Status: She is alert.   Psychiatric:         Mood and Affect: Mood is depressed. Affect is tearful.       Assessment:       1. Encounter for long-term current use of medication    2. Chronic midline low back pain with bilateral sciatica    3. Spinal stenosis, unspecified spinal region    4. DDD (degenerative disc disease), lumbar    5. Herpes simplex vulvovaginitis    6. Low back pain radiating to lower extremity    7. Thoracic facet syndrome    8. Lumbosacral radiculopathy    9. Cervical disc disorder        Plan:       Problem List Items Addressed This Visit          Neuro    Cervical disc disorder    Relevant Orders    Ambulatory referral/consult to Pain Clinic    Lumbosacral radiculopathy    Relevant Orders    Ambulatory referral/consult to Pain Clinic     Thoracic facet syndrome    Relevant Orders    Ambulatory referral/consult to Pain Clinic       Orthopedic    Chronic midline low back pain with bilateral sciatica    Relevant Medications    gabapentin (NEURONTIN) 600 MG tablet    oxyCODONE-acetaminophen (PERCOCET)  mg per tablet    Low back pain radiating to lower extremity    Relevant Medications    oxyCODONE-acetaminophen (PERCOCET)  mg per tablet     Other Visit Diagnoses       Encounter for long-term current use of medication    -  Primary    Relevant Orders    Drug screen panel, in-house    Spinal stenosis, unspecified spinal region        Relevant Medications    gabapentin (NEURONTIN) 600 MG tablet    DDD (degenerative disc disease), lumbar        Relevant Medications    gabapentin (NEURONTIN) 600 MG tablet    Herpes simplex vulvovaginitis        Relevant Medications    valACYclovir (VALTREX) 1000 MG tablet                          (2) assistive person

## 2023-07-26 NOTE — ED PROVIDER NOTE - NSICDXFAMILYHX_GEN_ALL_CORE_FT
Anesthesia Volume In Cc (Will Not Render If 0): 0.5 FAMILY HISTORY:  Father  Still living? Unknown  Family history of brain tumor, Age at diagnosis: Age Unknown

## 2023-08-09 ENCOUNTER — EMERGENCY (EMERGENCY)
Facility: HOSPITAL | Age: 78
LOS: 1 days | Discharge: HOME | End: 2023-08-09
Attending: EMERGENCY MEDICINE
Payer: MEDICARE

## 2023-08-09 VITALS
WEIGHT: 164.91 LBS | OXYGEN SATURATION: 98 % | DIASTOLIC BLOOD PRESSURE: 66 MMHG | HEIGHT: 62 IN | HEART RATE: 92 BPM | SYSTOLIC BLOOD PRESSURE: 136 MMHG | RESPIRATION RATE: 18 BRPM | TEMPERATURE: 99 F

## 2023-08-09 DIAGNOSIS — Z90.49 ACQUIRED ABSENCE OF OTHER SPECIFIED PARTS OF DIGESTIVE TRACT: Chronic | ICD-10-CM

## 2023-08-09 LAB
APPEARANCE UR: CLEAR — SIGNIFICANT CHANGE UP
APTT BLD: 33.6 SEC — SIGNIFICANT CHANGE UP (ref 24.5–35.6)
BACTERIA # UR AUTO: NEGATIVE — SIGNIFICANT CHANGE UP
BASE EXCESS BLDV CALC-SCNC: 2.5 MMOL/L — SIGNIFICANT CHANGE UP (ref -2–3)
BASOPHILS # BLD AUTO: 0.04 K/UL — SIGNIFICANT CHANGE UP (ref 0–0.2)
BASOPHILS NFR BLD AUTO: 0.2 % — SIGNIFICANT CHANGE UP (ref 0–2)
BILIRUB UR-MCNC: NEGATIVE — SIGNIFICANT CHANGE UP
CA-I SERPL-SCNC: 1.19 MMOL/L — SIGNIFICANT CHANGE UP (ref 1.15–1.33)
CHLORIDE BLDV-SCNC: 100 MMOL/L — SIGNIFICANT CHANGE UP (ref 96–108)
CO2 BLDV-SCNC: 30 MMOL/L — HIGH (ref 22–26)
COLOR SPEC: COLORLESS — SIGNIFICANT CHANGE UP
DIFF PNL FLD: ABNORMAL
EOSINOPHIL # BLD AUTO: 0.1 K/UL — SIGNIFICANT CHANGE UP (ref 0–0.5)
EOSINOPHIL NFR BLD AUTO: 0.5 % — SIGNIFICANT CHANGE UP (ref 0–6)
EPI CELLS # UR: 4 /HPF — SIGNIFICANT CHANGE UP
GAS PNL BLDV: 135 MMOL/L — LOW (ref 136–145)
GAS PNL BLDV: SIGNIFICANT CHANGE UP
GAS PNL BLDV: SIGNIFICANT CHANGE UP
GLUCOSE BLDV-MCNC: 175 MG/DL — HIGH (ref 70–99)
GLUCOSE UR QL: NEGATIVE — SIGNIFICANT CHANGE UP
HCO3 BLDV-SCNC: 28 MMOL/L — SIGNIFICANT CHANGE UP (ref 22–29)
HCT VFR BLD CALC: 38.3 % — SIGNIFICANT CHANGE UP (ref 34.5–45)
HCT VFR BLDA CALC: 38 % — SIGNIFICANT CHANGE UP (ref 34.5–46.5)
HGB BLD CALC-MCNC: 12.5 G/DL — SIGNIFICANT CHANGE UP (ref 11.7–16.1)
HGB BLD-MCNC: 12.5 G/DL — SIGNIFICANT CHANGE UP (ref 11.5–15.5)
IMM GRANULOCYTES NFR BLD AUTO: 0.5 % — SIGNIFICANT CHANGE UP (ref 0–0.9)
INR BLD: 1.53 RATIO — HIGH (ref 0.85–1.18)
KETONES UR-MCNC: NEGATIVE — SIGNIFICANT CHANGE UP
LACTATE BLDV-MCNC: 1.8 MMOL/L — SIGNIFICANT CHANGE UP (ref 0.5–2)
LEUKOCYTE ESTERASE UR-ACNC: NEGATIVE — SIGNIFICANT CHANGE UP
LYMPHOCYTES # BLD AUTO: 1.58 K/UL — SIGNIFICANT CHANGE UP (ref 1–3.3)
LYMPHOCYTES # BLD AUTO: 8.5 % — LOW (ref 13–44)
MCHC RBC-ENTMCNC: 31.9 PG — SIGNIFICANT CHANGE UP (ref 27–34)
MCHC RBC-ENTMCNC: 32.6 GM/DL — SIGNIFICANT CHANGE UP (ref 32–36)
MCV RBC AUTO: 97.7 FL — SIGNIFICANT CHANGE UP (ref 80–100)
MONOCYTES # BLD AUTO: 0.97 K/UL — HIGH (ref 0–0.9)
MONOCYTES NFR BLD AUTO: 5.2 % — SIGNIFICANT CHANGE UP (ref 2–14)
NEUTROPHILS # BLD AUTO: 15.83 K/UL — HIGH (ref 1.8–7.4)
NEUTROPHILS NFR BLD AUTO: 85.1 % — HIGH (ref 43–77)
NITRITE UR-MCNC: NEGATIVE — SIGNIFICANT CHANGE UP
NRBC # BLD: 0 /100 WBCS — SIGNIFICANT CHANGE UP (ref 0–0)
NT-PROBNP SERPL-SCNC: 218 PG/ML — SIGNIFICANT CHANGE UP (ref 0–300)
PCO2 BLDV: 48 MMHG — HIGH (ref 39–42)
PH BLDV: 7.38 — SIGNIFICANT CHANGE UP (ref 7.32–7.43)
PH UR: 7 — SIGNIFICANT CHANGE UP (ref 5–8)
PLATELET # BLD AUTO: 165 K/UL — SIGNIFICANT CHANGE UP (ref 150–400)
PO2 BLDV: 45 MMHG — SIGNIFICANT CHANGE UP (ref 25–45)
POTASSIUM BLDV-SCNC: 3.9 MMOL/L — SIGNIFICANT CHANGE UP (ref 3.5–5.1)
PROT UR-MCNC: NEGATIVE — SIGNIFICANT CHANGE UP
PROTHROM AB SERPL-ACNC: 16.6 SEC — HIGH (ref 9.5–13)
RBC # BLD: 3.92 M/UL — SIGNIFICANT CHANGE UP (ref 3.8–5.2)
RBC # FLD: 14.3 % — SIGNIFICANT CHANGE UP (ref 10.3–14.5)
RBC CASTS # UR COMP ASSIST: 17 /HPF — HIGH (ref 0–4)
SAO2 % BLDV: 72.5 % — SIGNIFICANT CHANGE UP (ref 67–88)
SP GR SPEC: 1.01 — SIGNIFICANT CHANGE UP (ref 1.01–1.02)
TROPONIN T, HIGH SENSITIVITY RESULT: 25 NG/L — SIGNIFICANT CHANGE UP (ref 0–51)
UROBILINOGEN FLD QL: NEGATIVE — SIGNIFICANT CHANGE UP
WBC # BLD: 18.61 K/UL — HIGH (ref 3.8–10.5)
WBC # FLD AUTO: 18.61 K/UL — HIGH (ref 3.8–10.5)
WBC UR QL: 4 /HPF — SIGNIFICANT CHANGE UP (ref 0–5)

## 2023-08-09 PROCEDURE — 99285 EMERGENCY DEPT VISIT HI MDM: CPT

## 2023-08-09 PROCEDURE — 71045 X-RAY EXAM CHEST 1 VIEW: CPT | Mod: 26

## 2023-08-09 PROCEDURE — 74176 CT ABD & PELVIS W/O CONTRAST: CPT | Mod: 26,MA

## 2023-08-09 RX ORDER — ACETAMINOPHEN 500 MG
1000 TABLET ORAL ONCE
Refills: 0 | Status: DISCONTINUED | OUTPATIENT
Start: 2023-08-09 | End: 2023-08-09

## 2023-08-09 RX ORDER — ACETAMINOPHEN 500 MG
975 TABLET ORAL ONCE
Refills: 0 | Status: COMPLETED | OUTPATIENT
Start: 2023-08-09 | End: 2023-08-09

## 2023-08-09 RX ADMIN — Medication 975 MILLIGRAM(S): at 23:53

## 2023-08-09 NOTE — ED PROVIDER NOTE - NS ED ROS FT
Constitutional:  (-) fever, (-) chills, (-) lethargy  Eyes:  (-) eye pain (-) visual changes  ENMT: (-) nasal discharge, (-) sore throat. (-) neck pain or stiffness  Cardiac: (-) chest pain (-) palpitations  Respiratory:  (-) cough (-) respiratory distress.   GI:  (-) nausea (-) vomiting (-) diarrhea (+) abdominal pain.  :  (-) dysuria (-) frequency (-) burning.  MS:  (+) back pain (-) joint pain.  Neuro:  (-) headache (-) numbness (-) tingling (-) focal weakness  Skin:  (-) rash  Except as documented in the HPI,  all other systems are negative

## 2023-08-09 NOTE — ED PROVIDER NOTE - NSICDXPASTSURGICALHX_GEN_ALL_CORE_FT
PAST SURGICAL HISTORY:  AICD (Automatic Cardioverter/Defibrillator) Present inserted in Aug, 2008. Due for battery change in 1 month. ( [employer]) . Inserted by Dr Duffy    S/P cholecystectomy     Status post left hemicolectomy

## 2023-08-09 NOTE — ED PROVIDER NOTE - PROGRESS NOTE DETAILS
Signout follow-up: Symptoms resolved.  Abdomen soft nontender nondistended.  Respirations on room air.  No cough.  Patient informed of lung nodules.  She has pulmonology follow-up.  Unclear source of leukocytosis.  Patient is not on oral steroids.  Patient does not have any source of infection on workup.  Will send blood cultures.  Return precautions discussed. PETER

## 2023-08-09 NOTE — ED PROVIDER NOTE - ATTENDING CONTRIBUTION TO CARE
78F with PMH/PSH including NICM, CHF (EF 50-55 on note from 4/17/23), PPM, Aflutter on Eliquis, COPD, asthma (Ventolin, Breztri) complicated by respiratory failure (trach and PEG in past), GI bleed, perforated diverticulitis, s/p Ex Lap and Lupe's (7/2019), s/p L hemicolectomy and end colostomy.   Presenting with left-sided flank pain worse with movement and likely MSK in nature but has history of kidney stone denies any nausea or vomiting or fevers no UTI symptoms colostomy site is intact with air and stool in her bag.  Qdoc ordered CT and labs urine pending results 78F with PMH/PSH including NICM, CHF (EF 50-55 on note from 4/17/23), PPM, Aflutter on Eliquis, COPD, asthma (Ventolin, Breztri) complicated by respiratory failure (trach and PEG in past), GI bleed, perforated diverticulitis, s/p Ex Lap and Lupe's (7/2019), s/p L hemicolectomy and end colostomy.   Presenting with left-sided flank pain worse with movement and likely MSK in nature but has history of kidney stone denies any nausea or vomiting or fevers no UTI symptoms colostomy site is intact with air and stool in her bag.  Qdoc ordered CT and labs urine pending results, signed out pending work up.

## 2023-08-09 NOTE — ED PROVIDER NOTE - OBJECTIVE STATEMENT
78-year-old female, history of NICM, COPD, CHF, Aflutter on Eliquis, diabetes, kidney stone, perforated diverticulitis status post colostomy bag placement, presenting with 4 days onset of left flank pain.  Denies fever, hematuria, dysuria, nausea, vomiting, diarrhea.  Patient is also complaining of bilateral lower extremity edema.  She is on Lasix.  Denies shortness of breath, chest pain. PMD: Dr. Kirill Daily.

## 2023-08-09 NOTE — ED PROVIDER NOTE - NSFOLLOWUPINSTRUCTIONS_ED_ALL_ED_FT
You were evaluated for flank pain during your visit.   No infections or surgical problems were found on your imaging.  Your CT showed lung nodules.  Please follow-up with your pulmonologist for surveillance imaging.  A blood culture and urine culture was sent during the time of your visit and will be resulted within a few days.  Please seek medical attention if you develop fevers, your pain returns, reviewed concerns.

## 2023-08-09 NOTE — ED PROVIDER NOTE - CLINICAL SUMMARY MEDICAL DECISION MAKING FREE TEXT BOX
78-year-old female, history of NICM, COPD, CHF, Aflutter on Eliquis, diabetes, kidney stone, perforated diverticulitis status post colostomy bag placement, presenting with 4 days onset of left flank pain.  Denies fever, hematuria, dysuria, nausea, vomiting, diarrhea.  Patient is also complaining of bilateral lower extremity edema.  She is on Lasix.  Denies shortness of breath, chest pain. Vital signs within normal limits on arrival.  Physical exam as noted above.  Patient is well-appearing, not in acute distress, no respiratory distress, mild Rales appreciated bilaterally, colostomy bag noted, left CVA tenderness noted, abdomen is soft, mild bilateral lower extremity swelling noted.  Differentials include but not limited to nephrolithiasis versus pyelonephritis versus diverticulitis.  Will get labs, CT abdominal pelvis, pain control, reassess.

## 2023-08-09 NOTE — ED PROVIDER NOTE - NSICDXPASTMEDICALHX_GEN_ALL_CORE_FT
PAST MEDICAL HISTORY:  Asthma     Atrial flutter     Cardiac Pacemaker     Cardiomyopathy     Congestive Heart Failure     Diabetes     Diverticulitis     Gout     HTN - Hypertension     Kidney stone     Patient is Roman [employer]     Refusal of blood transfusions as patient is Roman Catholic     Vertigo

## 2023-08-09 NOTE — ED PROVIDER NOTE - PHYSICAL EXAMINATION
Gen: Patient is well-appearing, NAD, AAOx3, able to ambulate without assistance  HEENT: NCAT, normal conjunctiva, tongue midline, oral mucosa moist  Lung: no respiratory distress, wheezes/rales B/L, speaking in full sentences  CV: RRR, no murmurs, rubs or gallops, distal pulses 2+ b/l  Abd: soft, R sided colostomy bag noted, NT, ND, no guarding, no rigidity, no rebound tenderness, + L CVA tenderness   MSK: no visible deformities, ROM normal in UE/LE  Neuro: No focal sensory or motor deficits  Skin: Warm, well perfused, mild LE swelling bilaterally   Psych: normal affect, calm

## 2023-08-10 VITALS
RESPIRATION RATE: 16 BRPM | DIASTOLIC BLOOD PRESSURE: 69 MMHG | TEMPERATURE: 98 F | SYSTOLIC BLOOD PRESSURE: 111 MMHG | OXYGEN SATURATION: 96 % | HEART RATE: 76 BPM

## 2023-08-10 LAB
BASOPHILS # BLD AUTO: 0.05 K/UL — SIGNIFICANT CHANGE UP (ref 0–0.2)
BASOPHILS NFR BLD AUTO: 0.3 % — SIGNIFICANT CHANGE UP (ref 0–2)
CULTURE RESULTS: SIGNIFICANT CHANGE UP
EOSINOPHIL # BLD AUTO: 0.14 K/UL — SIGNIFICANT CHANGE UP (ref 0–0.5)
EOSINOPHIL NFR BLD AUTO: 0.9 % — SIGNIFICANT CHANGE UP (ref 0–6)
HCT VFR BLD CALC: 40.6 % — SIGNIFICANT CHANGE UP (ref 34.5–45)
HGB BLD-MCNC: 12.9 G/DL — SIGNIFICANT CHANGE UP (ref 11.5–15.5)
IMM GRANULOCYTES NFR BLD AUTO: 0.4 % — SIGNIFICANT CHANGE UP (ref 0–0.9)
LYMPHOCYTES # BLD AUTO: 1.92 K/UL — SIGNIFICANT CHANGE UP (ref 1–3.3)
LYMPHOCYTES # BLD AUTO: 13 % — SIGNIFICANT CHANGE UP (ref 13–44)
MCHC RBC-ENTMCNC: 31.4 PG — SIGNIFICANT CHANGE UP (ref 27–34)
MCHC RBC-ENTMCNC: 31.8 GM/DL — LOW (ref 32–36)
MCV RBC AUTO: 98.8 FL — SIGNIFICANT CHANGE UP (ref 80–100)
MONOCYTES # BLD AUTO: 0.85 K/UL — SIGNIFICANT CHANGE UP (ref 0–0.9)
MONOCYTES NFR BLD AUTO: 5.7 % — SIGNIFICANT CHANGE UP (ref 2–14)
NEUTROPHILS # BLD AUTO: 11.78 K/UL — HIGH (ref 1.8–7.4)
NEUTROPHILS NFR BLD AUTO: 79.7 % — HIGH (ref 43–77)
NRBC # BLD: 0 /100 WBCS — SIGNIFICANT CHANGE UP (ref 0–0)
PLATELET # BLD AUTO: 151 K/UL — SIGNIFICANT CHANGE UP (ref 150–400)
RAPID RVP RESULT: SIGNIFICANT CHANGE UP
RBC # BLD: 4.11 M/UL — SIGNIFICANT CHANGE UP (ref 3.8–5.2)
RBC # FLD: 14 % — SIGNIFICANT CHANGE UP (ref 10.3–14.5)
SARS-COV-2 RNA SPEC QL NAA+PROBE: SIGNIFICANT CHANGE UP
SPECIMEN SOURCE: SIGNIFICANT CHANGE UP
WBC # BLD: 14.8 K/UL — HIGH (ref 3.8–10.5)
WBC # FLD AUTO: 14.8 K/UL — HIGH (ref 3.8–10.5)

## 2023-08-10 PROCEDURE — 87086 URINE CULTURE/COLONY COUNT: CPT

## 2023-08-10 PROCEDURE — 85014 HEMATOCRIT: CPT

## 2023-08-10 PROCEDURE — 85610 PROTHROMBIN TIME: CPT

## 2023-08-10 PROCEDURE — 93005 ELECTROCARDIOGRAM TRACING: CPT

## 2023-08-10 PROCEDURE — 82947 ASSAY GLUCOSE BLOOD QUANT: CPT

## 2023-08-10 PROCEDURE — 71045 X-RAY EXAM CHEST 1 VIEW: CPT

## 2023-08-10 PROCEDURE — 84132 ASSAY OF SERUM POTASSIUM: CPT

## 2023-08-10 PROCEDURE — 84484 ASSAY OF TROPONIN QUANT: CPT

## 2023-08-10 PROCEDURE — 82803 BLOOD GASES ANY COMBINATION: CPT

## 2023-08-10 PROCEDURE — 85025 COMPLETE CBC W/AUTO DIFF WBC: CPT

## 2023-08-10 PROCEDURE — 82330 ASSAY OF CALCIUM: CPT

## 2023-08-10 PROCEDURE — 83605 ASSAY OF LACTIC ACID: CPT

## 2023-08-10 PROCEDURE — 80053 COMPREHEN METABOLIC PANEL: CPT

## 2023-08-10 PROCEDURE — 84295 ASSAY OF SERUM SODIUM: CPT

## 2023-08-10 PROCEDURE — 82435 ASSAY OF BLOOD CHLORIDE: CPT

## 2023-08-10 PROCEDURE — 85018 HEMOGLOBIN: CPT

## 2023-08-10 PROCEDURE — 83880 ASSAY OF NATRIURETIC PEPTIDE: CPT

## 2023-08-10 PROCEDURE — 85730 THROMBOPLASTIN TIME PARTIAL: CPT

## 2023-08-10 PROCEDURE — 0225U NFCT DS DNA&RNA 21 SARSCOV2: CPT

## 2023-08-10 PROCEDURE — 81001 URINALYSIS AUTO W/SCOPE: CPT

## 2023-08-10 PROCEDURE — 99285 EMERGENCY DEPT VISIT HI MDM: CPT | Mod: 25

## 2023-08-10 PROCEDURE — 74176 CT ABD & PELVIS W/O CONTRAST: CPT | Mod: MA

## 2023-08-10 NOTE — ED ADULT NURSE NOTE - NSICDXPASTMEDICALHX_GEN_ALL_CORE_FT
PAST MEDICAL HISTORY:  Asthma     Atrial flutter     Cardiac Pacemaker     Cardiomyopathy     Congestive Heart Failure     Diabetes     Diverticulitis     Gout     HTN - Hypertension     Kidney stone     Patient is Voodoo     Refusal of blood transfusions as patient is Voodoo     Vertigo

## 2023-08-10 NOTE — CONSULT NOTE ADULT - ASSESSMENT
78 year old with hx of perforated diverticultis s/p Lupe's in 2019 with complicated post-operative course and now with chronic low output EC fistula presenting with left flank pain.    Afebrile. Has leukocytosis. Ostomy is functioning well.    Plan:  - No acute surgical interventions at this time  - Leukocytosis due to be 2/2 EC fistula or intraabdominal pathology (although CT scan is noncon)  - Recommend medical admission for work up of leukocytosis  - Leukocytosis may be 2/2 renal calculi in left kidney (would also explain flank pain)    Discussed with chief resident. To be discussed with surgery attending, Dr. Osorio.      [facility]  [id number]

## 2023-08-10 NOTE — ED ADULT NURSE NOTE - OBJECTIVE STATEMENT
77 y/o F A&Ox4 with PMH of HTN, asthma, CHF, COPD, fistulas and colon resections. Pt presents to the ED c/o back pain. Pt reports intermittent L sided torso pain for a couple days but has gotten increasingly worse. Pt reports taking Tylenol around 1 pm prior to arrival to ED with mild relief in pain. Pt endorses HA since yesterday. Pt reports she has a R ostomy bag. Denies chest pain, n/v/d, constipation, fever, chills. Patient safety and comfort measures implemented.

## 2023-08-10 NOTE — CONSULT NOTE ADULT - SUBJECTIVE AND OBJECTIVE BOX
General Surgery Consult  Consulting surgical team: [facility]  Consulting attending: Dr. Osorio    HPI:  Patient is a 78 year old female with PMHx significant for NICM, CHF, A flutter, PPM placement, COPD, asthma, and diverticulitis presenting with left flank pain. Patient has history of perforated diverticulitis s/p Lupe's in 2019 with Dr. Alvarado. Complicated post operative course involving open abdomen, requiring multiple take backs to the OR for abdominal washouts and eventual closure. Patient has history of chronic EC fistula. Given flank pain and leukocytosis, CT scan obtained in ED. Demonstrating stable EC fistula. Low output and able to be covered with small gauze and tape on regular basis. Ostomy is functioning with gas and stool. Denies nausea, vomiting, fevers, and chills. Surgery consulted for evaluation of EC fistula in setting of leukocytosis.        PAST MEDICAL HISTORY:  Asthma    Congestive Heart Failure    Diabetes    Gout    HTN - Hypertension    Cardiac Pacemaker    COPD (chronic obstructive pulmonary disease)    Kidney stone    Cardiomyopathy    Diverticulitis    Atrial flutter    Vertigo    Patient is Tenriism    Refusal of blood transfusions as patient is Tenriism        PAST SURGICAL HISTORY:  S/P Cholecystectomy    AICD (Automatic Cardioverter/Defibrillator) Present    S/P cholecystectomy    Status post left hemicolectomy        MEDICATIONS:      ALLERGIES:  Coreg (Other)  digoxin (Other; Short breath (Mild to Mod))  penicillins (Hives)  Lopressor (Unknown)  Solu-Medrol (Other (Mild to Mod))  metoprolol (Other)      VITALS & I/Os:  Vital Signs Last 24 Hrs  T(C): 36.4 (10 Aug 2023 04:17), Max: 37.1 (09 Aug 2023 14:54)  T(F): 97.6 (10 Aug 2023 04:17), Max: 98.7 (09 Aug 2023 14:54)  HR: 76 (10 Aug 2023 04:17) (72 - 92)  BP: 111/69 (10 Aug 2023 04:17) (111/69 - 136/66)  BP(mean): 83 (10 Aug 2023 04:17) (83 - 83)  RR: 16 (10 Aug 2023 04:17) (16 - 18)  SpO2: 96% (10 Aug 2023 04:17) (95% - 98%)    Parameters below as of 10 Aug 2023 04:17  Patient On (Oxygen Delivery Method): room air        I&O's Summary      PHYSICAL EXAM:  Gen: NAD  CV: Regular rate  Resp: Nonlabored breathing on room air  Abd: Soft, nondistended, nontender, low output EC fistula with no active drainage noted, no erythema / induration / fluctance noted, ostomy is pink and viable/gas + stool in bag  Ext: Warm        LABS:                        12.9   14.80 )-----------( 151      ( 10 Aug 2023 04:15 )             40.6         137  |  97  |  22  ----------------------------<  164<H>  4.0   |  24  |  1.09    Ca    9.7      09 Aug 2023 18:37    TPro  7.5  /  Alb  4.1  /  TBili  0.9  /  DBili  x   /  AST  18  /  ALT  21  /  AlkPhos  100      Lactate:   @ 18:15  1.8    PT/INR - ( 09 Aug 2023 18:37 )   PT: 16.6 sec;   INR: 1.53 ratio         PTT - ( 09 Aug 2023 18:37 )  PTT:33.6 sec          Urinalysis Basic - ( 09 Aug 2023 18:40 )    Color: Colorless / Appearance: Clear / S.010 / pH: x  Gluc: x / Ketone: Negative  / Bili: Negative / Urobili: Negative   Blood: x / Protein: Negative / Nitrite: Negative   Leuk Esterase: Negative / RBC: 17 /hpf / WBC 4 /HPF   Sq Epi: x / Non Sq Epi: x / Bacteria: Negative        IMAGING:  < from: CT Abdomen and Pelvis No Cont (23 @ 23:34) >    ******PRELIMINARY REPORT******      ******PRELIMINARY REPORT******         ACC: [id number] EXAM:  CT ABDOMEN AND PELVIS   ORDERED BY: VERENA ORR     PROCEDURE DATE:  2023    ******PRELIMINARY REPORT******      ******PRELIMINARY REPORT******           INTERPRETATION:  [facility] RADIOLOGIST PRELIMINARY REPORT    PROCEDURE INFORMATION:  Exam: CT Abdomen And Pelvis Without Contrast  Exam date and time: 2023 11:27 PM  Age: 78 years old  Clinical indication: L flank pain    TECHNIQUE:  Imaging protocol: Computed tomography of the abdomen and pelvis without  contrast.    COMPARISON:  CT ABDOMEN AND PELVIS WITH ORAL CONTRAST WITH IV CONTRAST 2023 11:47   PM    FINDINGS:  Tubes, catheters and devices: Partial visualization cardiac device leads.    Lungs: Sections through the lung bases demonstrate stable   scarring/atelectasis  particularly in the right lower lung. There has been the development of  linear/nodular opacities in the left lung base which should be further  evaluated with dedicated chest imaging to evaluate for inflammation versus  neoplasm. See image 62 series 5 for nodular opacity measuring up to  approximately 1.5 cm.    Liver: The noncontrast liver remains nonacute.  Gallbladder and bile ducts: The patient is status post cholecystectomy   with no  evidence of biliary dilatation.  Pancreas:  Relative atrophy as on prior study.  Spleen: Spleen unremarkable.  Adrenal glands: Adrenal glands remain unremarkable.  Kidneys and ureters: As on the prior study 2023, multiple   nonobstructing  renal calculi left kidney particularly lower polar again noted. No   evidence of  interval hydronephrosis or obvious ureteral calcifications. The right   kidney  remains unremarkable.  Stomach and bowel:  Within hernia.  Appendix: See &quot;Soft tissues&quot; finding.    Intraperitoneal space: See &quot;Soft tissues&quot; finding.  Vasculature: There is no aneurysm involving the abdominal aorta.  Lymph nodes: Unremarkable. No enlarged lymph nodes.  Urinary bladder: Non distension urinary bladder.  Reproductive: Likely leiomyoma with calcification uterus as before.  Bones/joints: Unremarkable. No acute fracture.  Soft tissues: As on the prior study 2023, diastasis recti and large  midline ventral abdominal herniaagain noted containing portions of   stomach,  left hepatic lobe as well as small and large bowel. Axial images 67-70 of  series 3 and coronal images 32-45 of series 5 demonstrates that the   nondilated  appendix extends into the proximal portion of this area of diastasis. In  addition as on the prior study, small air containing fluid collection in   the  left lower ventral abdominal wall medially along the hernia site extends   to the  skin surface as on the prior study. This likely represents patient&apos;s   known  chronic abdominal fistulas. Contrast administration its of fistulas would  assess for any pathological connections.    IMPRESSION:  1.   As on the prior study 2023, diastasis recti and large midline  ventral abdominal hernia again noted containing portions of stomach, left  hepatic lobe as well as small and large bowel. Axial images 67-70 of   series 3  and coronal images 32-45 of series 5 demonstrates that the nondilated   appendix  extends into the proximal portion of this area of diastasis. In addition   as on  the prior study, small air containing fluid collection in the left lower  ventral abdominal wall medially along the hernia site extends to the skin  surface as on the prior study. This likely represents patient&apos;s   known chronic  abdominal fistulas. Contrast administration its of fistulas would assess   for  any pathological connections. The prior study was performed with enteric  contrast. Current study is without intravenous or enteric contrast.   Enteric  contrast would more optimally assess the mucosa and details of the   herniated  bowel. However, there is no definite evidence of any bowel obstruction or  pneumoperitoneum.  2.   As on the prior study 2023, multiple nonobstructing renal   calculi  left kidney particularly lower polar again noted. No evidence of interval  hydronephrosis or obvious ureteral calcifications. The right kidney   remains  unremarkable.  3.   Sections through the lung bases demonstrate stable   scarring/atelectasis  particularly in the right lower lung. There has been the development of  linear/nodular opacities in the left lung base which should be further  evaluated with dedicated chest imaging to evaluate for inflammation versus  neoplasm. See image 62 series 5 for nodular opacity measuring up to  approximately 1.5 cm.    Other incidental findings as above.        ******PRELIMINARY REPORT******      ******PRELIMINARY REPORT******         CINDA FOFANA M.D.;[facility] RADIOLOGIST  This documentis a PRELIMINARY interpretation and is pending final   attending approval. Aug 10 2023  1:02AM    < end of copied text >

## 2023-08-10 NOTE — ED ADULT NURSE NOTE - NSFALLUNIVINTERV_ED_ALL_ED
Bed/Stretcher in lowest position, wheels locked, appropriate side rails in place/Call bell, personal items and telephone in reach/[place] to call system/Physically safe environment - no spills, clutter or unnecessary equipment/Purposeful proactive rounding/Room/bathroom lighting operational, light cord in reach

## 2023-08-10 NOTE — ED ADULT NURSE NOTE - NSICDXPASTSURGICALHX_GEN_ALL_CORE_FT
PAST SURGICAL HISTORY:  AICD (Automatic Cardioverter/Defibrillator) Present inserted in Aug, 2008. Due for battery change in 1 month. ( [employer]) . Inserted by Dr Duffy    S/P cholecystectomy     Status post left hemicolectomy

## 2023-08-11 NOTE — ED POST DISCHARGE NOTE - DETAILS
8/11/23 Franci GUSTAFSON- pt informed at d/c and told to f/up with pulm. lung opacities are inflammatory/infectious vs neoplastic. pt's information sent to cancer care direct.

## 2023-09-10 ENCOUNTER — INPATIENT (INPATIENT)
Facility: HOSPITAL | Age: 78
LOS: 2 days | Discharge: HOME | DRG: 245 | End: 2023-09-13
Attending: INTERNAL MEDICINE | Admitting: STUDENT IN AN ORGANIZED HEALTH CARE EDUCATION/TRAINING PROGRAM
Payer: MEDICARE

## 2023-09-10 VITALS
OXYGEN SATURATION: 97 % | WEIGHT: 160.94 LBS | HEIGHT: 62 IN | HEART RATE: 93 BPM | DIASTOLIC BLOOD PRESSURE: 85 MMHG | SYSTOLIC BLOOD PRESSURE: 119 MMHG | RESPIRATION RATE: 18 BRPM | TEMPERATURE: 97 F

## 2023-09-10 DIAGNOSIS — R42 DIZZINESS AND GIDDINESS: ICD-10-CM

## 2023-09-10 DIAGNOSIS — J45.909 UNSPECIFIED ASTHMA, UNCOMPLICATED: ICD-10-CM

## 2023-09-10 DIAGNOSIS — I48.92 UNSPECIFIED ATRIAL FLUTTER: ICD-10-CM

## 2023-09-10 DIAGNOSIS — I50.22 CHRONIC SYSTOLIC (CONGESTIVE) HEART FAILURE: ICD-10-CM

## 2023-09-10 DIAGNOSIS — E11.9 TYPE 2 DIABETES MELLITUS WITHOUT COMPLICATIONS: ICD-10-CM

## 2023-09-10 DIAGNOSIS — Z90.49 ACQUIRED ABSENCE OF OTHER SPECIFIED PARTS OF DIGESTIVE TRACT: Chronic | ICD-10-CM

## 2023-09-10 LAB
ALBUMIN SERPL ELPH-MCNC: 3.8 G/DL — SIGNIFICANT CHANGE UP (ref 3.3–5)
ALP SERPL-CCNC: 101 U/L — SIGNIFICANT CHANGE UP (ref 40–120)
ALT FLD-CCNC: 11 U/L — SIGNIFICANT CHANGE UP (ref 10–45)
ANION GAP SERPL CALC-SCNC: 15 MMOL/L — SIGNIFICANT CHANGE UP (ref 5–17)
APTT BLD: 32.7 SEC — SIGNIFICANT CHANGE UP (ref 24.5–35.6)
AST SERPL-CCNC: 15 U/L — SIGNIFICANT CHANGE UP (ref 10–40)
BASOPHILS # BLD AUTO: 0.04 K/UL — SIGNIFICANT CHANGE UP (ref 0–0.2)
BASOPHILS NFR BLD AUTO: 0.4 % — SIGNIFICANT CHANGE UP (ref 0–2)
BILIRUB SERPL-MCNC: 0.5 MG/DL — SIGNIFICANT CHANGE UP (ref 0.2–1.2)
BUN SERPL-MCNC: 21 MG/DL — SIGNIFICANT CHANGE UP (ref 7–23)
CALCIUM SERPL-MCNC: 9.9 MG/DL — SIGNIFICANT CHANGE UP (ref 8.4–10.5)
CHLORIDE SERPL-SCNC: 99 MMOL/L — SIGNIFICANT CHANGE UP (ref 96–108)
CO2 SERPL-SCNC: 23 MMOL/L — SIGNIFICANT CHANGE UP (ref 22–31)
CREAT SERPL-MCNC: 1.05 MG/DL — SIGNIFICANT CHANGE UP (ref 0.5–1.3)
EGFR: 55 ML/MIN/1.73M2 — LOW
EOSINOPHIL # BLD AUTO: 0.12 K/UL — SIGNIFICANT CHANGE UP (ref 0–0.5)
EOSINOPHIL NFR BLD AUTO: 1.3 % — SIGNIFICANT CHANGE UP (ref 0–6)
GLUCOSE BLDC GLUCOMTR-MCNC: 183 MG/DL — HIGH (ref 70–99)
GLUCOSE SERPL-MCNC: 117 MG/DL — HIGH (ref 70–99)
HCT VFR BLD CALC: 37.6 % — SIGNIFICANT CHANGE UP (ref 34.5–45)
HGB BLD-MCNC: 12.3 G/DL — SIGNIFICANT CHANGE UP (ref 11.5–15.5)
IMM GRANULOCYTES NFR BLD AUTO: 0.4 % — SIGNIFICANT CHANGE UP (ref 0–0.9)
INR BLD: 1.4 RATIO — HIGH (ref 0.85–1.18)
LYMPHOCYTES # BLD AUTO: 1.71 K/UL — SIGNIFICANT CHANGE UP (ref 1–3.3)
LYMPHOCYTES # BLD AUTO: 18.8 % — SIGNIFICANT CHANGE UP (ref 13–44)
MCHC RBC-ENTMCNC: 31.5 PG — SIGNIFICANT CHANGE UP (ref 27–34)
MCHC RBC-ENTMCNC: 32.7 GM/DL — SIGNIFICANT CHANGE UP (ref 32–36)
MCV RBC AUTO: 96.4 FL — SIGNIFICANT CHANGE UP (ref 80–100)
MONOCYTES # BLD AUTO: 0.85 K/UL — SIGNIFICANT CHANGE UP (ref 0–0.9)
MONOCYTES NFR BLD AUTO: 9.4 % — SIGNIFICANT CHANGE UP (ref 2–14)
NEUTROPHILS # BLD AUTO: 6.32 K/UL — SIGNIFICANT CHANGE UP (ref 1.8–7.4)
NEUTROPHILS NFR BLD AUTO: 69.7 % — SIGNIFICANT CHANGE UP (ref 43–77)
NRBC # BLD: 0 /100 WBCS — SIGNIFICANT CHANGE UP (ref 0–0)
NT-PROBNP SERPL-SCNC: 206 PG/ML — SIGNIFICANT CHANGE UP (ref 0–300)
PLATELET # BLD AUTO: 189 K/UL — SIGNIFICANT CHANGE UP (ref 150–400)
POTASSIUM SERPL-MCNC: 3.9 MMOL/L — SIGNIFICANT CHANGE UP (ref 3.5–5.3)
POTASSIUM SERPL-SCNC: 3.9 MMOL/L — SIGNIFICANT CHANGE UP (ref 3.5–5.3)
PROT SERPL-MCNC: 8 G/DL — SIGNIFICANT CHANGE UP (ref 6–8.3)
PROTHROM AB SERPL-ACNC: 14.6 SEC — HIGH (ref 9.5–13)
RBC # BLD: 3.9 M/UL — SIGNIFICANT CHANGE UP (ref 3.8–5.2)
RBC # FLD: 13.5 % — SIGNIFICANT CHANGE UP (ref 10.3–14.5)
SODIUM SERPL-SCNC: 137 MMOL/L — SIGNIFICANT CHANGE UP (ref 135–145)
TROPONIN T, HIGH SENSITIVITY RESULT: 20 NG/L — SIGNIFICANT CHANGE UP (ref 0–51)
WBC # BLD: 9.08 K/UL — SIGNIFICANT CHANGE UP (ref 3.8–10.5)
WBC # FLD AUTO: 9.08 K/UL — SIGNIFICANT CHANGE UP (ref 3.8–10.5)

## 2023-09-10 PROCEDURE — 99223 1ST HOSP IP/OBS HIGH 75: CPT

## 2023-09-10 PROCEDURE — 73501 X-RAY EXAM HIP UNI 1 VIEW: CPT | Mod: 26,LT

## 2023-09-10 PROCEDURE — 71045 X-RAY EXAM CHEST 1 VIEW: CPT | Mod: 26

## 2023-09-10 PROCEDURE — 99285 EMERGENCY DEPT VISIT HI MDM: CPT

## 2023-09-10 RX ORDER — DEXTROSE 50 % IN WATER 50 %
25 SYRINGE (ML) INTRAVENOUS ONCE
Refills: 0 | Status: DISCONTINUED | OUTPATIENT
Start: 2023-09-10 | End: 2023-09-13

## 2023-09-10 RX ORDER — BUDESONIDE AND FORMOTEROL FUMARATE DIHYDRATE 160; 4.5 UG/1; UG/1
2 AEROSOL RESPIRATORY (INHALATION)
Qty: 0 | Refills: 0 | DISCHARGE

## 2023-09-10 RX ORDER — LANOLIN ALCOHOL/MO/W.PET/CERES
3 CREAM (GRAM) TOPICAL AT BEDTIME
Refills: 0 | Status: DISCONTINUED | OUTPATIENT
Start: 2023-09-10 | End: 2023-09-13

## 2023-09-10 RX ORDER — ACETAMINOPHEN 500 MG
650 TABLET ORAL EVERY 6 HOURS
Refills: 0 | Status: DISCONTINUED | OUTPATIENT
Start: 2023-09-10 | End: 2023-09-13

## 2023-09-10 RX ORDER — ATORVASTATIN CALCIUM 80 MG/1
0 TABLET, FILM COATED ORAL
Qty: 0 | Refills: 0 | DISCHARGE

## 2023-09-10 RX ORDER — SODIUM CHLORIDE 9 MG/ML
1000 INJECTION, SOLUTION INTRAVENOUS
Refills: 0 | Status: DISCONTINUED | OUTPATIENT
Start: 2023-09-10 | End: 2023-09-13

## 2023-09-10 RX ORDER — FOLIC ACID 0.8 MG
0 TABLET ORAL
Qty: 0 | Refills: 0 | DISCHARGE

## 2023-09-10 RX ORDER — TIOTROPIUM BROMIDE 18 UG/1
2 CAPSULE ORAL; RESPIRATORY (INHALATION) DAILY
Refills: 0 | Status: DISCONTINUED | OUTPATIENT
Start: 2023-09-10 | End: 2023-09-12

## 2023-09-10 RX ORDER — REPAGLINIDE 1 MG/1
1 TABLET ORAL
Qty: 0 | Refills: 0 | DISCHARGE

## 2023-09-10 RX ORDER — INSULIN GLARGINE 100 [IU]/ML
10 INJECTION, SOLUTION SUBCUTANEOUS AT BEDTIME
Refills: 0 | Status: DISCONTINUED | OUTPATIENT
Start: 2023-09-10 | End: 2023-09-13

## 2023-09-10 RX ORDER — ALBUTEROL 90 UG/1
1 AEROSOL, METERED ORAL
Qty: 0 | Refills: 0 | DISCHARGE

## 2023-09-10 RX ORDER — ONDANSETRON 8 MG/1
4 TABLET, FILM COATED ORAL EVERY 8 HOURS
Refills: 0 | Status: DISCONTINUED | OUTPATIENT
Start: 2023-09-10 | End: 2023-09-13

## 2023-09-10 RX ORDER — ATORVASTATIN CALCIUM 80 MG/1
20 TABLET, FILM COATED ORAL AT BEDTIME
Refills: 0 | Status: DISCONTINUED | OUTPATIENT
Start: 2023-09-10 | End: 2023-09-13

## 2023-09-10 RX ORDER — REPAGLINIDE 1 MG/1
0.5 TABLET ORAL
Refills: 0 | Status: DISCONTINUED | OUTPATIENT
Start: 2023-09-10 | End: 2023-09-12

## 2023-09-10 RX ORDER — INSULIN LISPRO 100/ML
VIAL (ML) SUBCUTANEOUS
Refills: 0 | Status: DISCONTINUED | OUTPATIENT
Start: 2023-09-10 | End: 2023-09-13

## 2023-09-10 RX ORDER — INSULIN GLARGINE 100 [IU]/ML
10 INJECTION, SOLUTION SUBCUTANEOUS
Refills: 0 | DISCHARGE

## 2023-09-10 RX ORDER — APIXABAN 2.5 MG/1
5 TABLET, FILM COATED ORAL EVERY 12 HOURS
Refills: 0 | Status: DISCONTINUED | OUTPATIENT
Start: 2023-09-10 | End: 2023-09-11

## 2023-09-10 RX ORDER — DEXTROSE 50 % IN WATER 50 %
12.5 SYRINGE (ML) INTRAVENOUS ONCE
Refills: 0 | Status: DISCONTINUED | OUTPATIENT
Start: 2023-09-10 | End: 2023-09-13

## 2023-09-10 RX ORDER — INSULIN LISPRO 100/ML
VIAL (ML) SUBCUTANEOUS AT BEDTIME
Refills: 0 | Status: DISCONTINUED | OUTPATIENT
Start: 2023-09-10 | End: 2023-09-13

## 2023-09-10 RX ORDER — SPIRONOLACTONE 25 MG/1
1 TABLET, FILM COATED ORAL
Refills: 0 | DISCHARGE

## 2023-09-10 RX ORDER — PANTOPRAZOLE SODIUM 20 MG/1
40 TABLET, DELAYED RELEASE ORAL
Refills: 0 | Status: DISCONTINUED | OUTPATIENT
Start: 2023-09-10 | End: 2023-09-13

## 2023-09-10 RX ORDER — SPIRONOLACTONE 25 MG/1
25 TABLET, FILM COATED ORAL
Refills: 0 | Status: DISCONTINUED | OUTPATIENT
Start: 2023-09-10 | End: 2023-09-13

## 2023-09-10 RX ORDER — GLUCAGON INJECTION, SOLUTION 0.5 MG/.1ML
1 INJECTION, SOLUTION SUBCUTANEOUS ONCE
Refills: 0 | Status: DISCONTINUED | OUTPATIENT
Start: 2023-09-10 | End: 2023-09-13

## 2023-09-10 RX ORDER — MONTELUKAST 4 MG/1
10 TABLET, CHEWABLE ORAL DAILY
Refills: 0 | Status: DISCONTINUED | OUTPATIENT
Start: 2023-09-10 | End: 2023-09-13

## 2023-09-10 RX ORDER — ASPIRIN/CALCIUM CARB/MAGNESIUM 324 MG
0 TABLET ORAL
Qty: 0 | Refills: 0 | DISCHARGE

## 2023-09-10 RX ORDER — BUDESONIDE AND FORMOTEROL FUMARATE DIHYDRATE 160; 4.5 UG/1; UG/1
2 AEROSOL RESPIRATORY (INHALATION)
Refills: 0 | Status: DISCONTINUED | OUTPATIENT
Start: 2023-09-10 | End: 2023-09-12

## 2023-09-10 RX ORDER — DEXTROSE 50 % IN WATER 50 %
15 SYRINGE (ML) INTRAVENOUS ONCE
Refills: 0 | Status: DISCONTINUED | OUTPATIENT
Start: 2023-09-10 | End: 2023-09-13

## 2023-09-10 RX ORDER — FOLIC ACID 0.8 MG
1 TABLET ORAL DAILY
Refills: 0 | Status: DISCONTINUED | OUTPATIENT
Start: 2023-09-10 | End: 2023-09-13

## 2023-09-10 RX ORDER — OMEPRAZOLE 10 MG/1
1 CAPSULE, DELAYED RELEASE ORAL
Qty: 0 | Refills: 0 | DISCHARGE

## 2023-09-10 RX ORDER — ALBUTEROL 90 UG/1
1 AEROSOL, METERED ORAL EVERY 4 HOURS
Refills: 0 | Status: DISCONTINUED | OUTPATIENT
Start: 2023-09-10 | End: 2023-09-13

## 2023-09-10 RX ORDER — FUROSEMIDE 40 MG
40 TABLET ORAL
Refills: 0 | Status: DISCONTINUED | OUTPATIENT
Start: 2023-09-10 | End: 2023-09-13

## 2023-09-10 RX ORDER — IPRATROPIUM/ALBUTEROL SULFATE 18-103MCG
3 AEROSOL WITH ADAPTER (GRAM) INHALATION EVERY 6 HOURS
Refills: 0 | Status: DISCONTINUED | OUTPATIENT
Start: 2023-09-10 | End: 2023-09-13

## 2023-09-10 NOTE — ED ADULT NURSE NOTE - OBJECTIVE STATEMENT
79 yo female with a PMH of CM, DM, CHF, PPM, a.flutter on eliquis, COPD, asthma, GI bleed, perforated diverticulitis, s/p lap band, PSH of hemicolectomy and s/p colostomy bag, presents to the ED from home complaining of pacemaker interrogation. Patient reports that on Friday she felt dizzy and sustained a syncopal episode. Daughter is at bedside and reports that she did not witness her fall but heard her screaming for help. Patient reports she does not think she hit head on anything. Patient reports that she has been intermittently dizzy for a bit. Had a telehealth visit and was told that this might be a pacemaker issue and to report to the ER. Upon arrival, patient reports that she is not dizzy laying in the stretcher. On cardiac monitor. Denies headache, dizziness, vision changes, chest pain, shortness of breath, abdominal pain, nausea, vomiting, diarrhea, fevers, chills, dysuria, hematuria, recent illness travel.

## 2023-09-10 NOTE — H&P ADULT - PROBLEM SELECTOR PLAN 5
- c/w Repaglinide ( home med)   - Toujeo ( home med) ->>Lantus 10u qHS while admitted    - ISS ACHS  - Check FS, adjust insulin accordingly   - DASH/CC diet  - Check A1c
80 year old female with history of A-fib, COPD, CAD, HTN, and chronic low back pain presents with exacerbation of chronic low back pain. has had low back pain for years. had episode of A-fib 5 days ago and was told by cardiologist to lie in bed. lied in bed for over 12 hours and started to have low back pain afterwards. was seen by pain management 3 days ago and was hoping to have epidural injection (pain management provider was reluctant to do injection due to recent hip surgery 4 weeks ago). was told to take Tylenol and also could take percocet. patient does not like to take pain meds but took 1 percocet last night with mild improvement of pain. called her PCP yesterday and started medrol dose pack. also went to PT yesterday and thinks that may have made back pain worse. was seen by PCP office today and seen by NP. was prescribed vicodin but did not get filled yet. pain 8/10. no radicular pain. no numbness in groin. no loss of bowel or bladder control. no fevers or history of IV drug use. no history of cancer or unexplained wt loss   [place]P [place]   pain management Andi Álvarez

## 2023-09-10 NOTE — H&P ADULT - NSICDXPASTMEDICALHX_GEN_ALL_CORE_FT
PAST MEDICAL HISTORY:  Asthma     Atrial flutter     Cardiac Pacemaker     Cardiomyopathy     Congestive Heart Failure     Diabetes     Diverticulitis     Gout     HTN - Hypertension     Kidney stone     Patient is Mu-ism     Refusal of blood transfusions as patient is Mu-ism     Vertigo

## 2023-09-10 NOTE — ED PROVIDER NOTE - PROGRESS NOTE DETAILS
Dale mills PA-C: spoke with war room. patient noted to have 18 beats of wide complex atrial tachycardia. spoke with cardiology. pending consult at this time. Dale mills PA-C: spoke with war room. patient noted to have 18 beats of wide complex atrial tachycardia. patient states she is asymptomatic when episode occurred. spoke with cardiology. pending consult at this time. discussed with Dr. Norris. Dale Puri PA-C: spoke with cardiology. states coming to see patient now. discussed with Dr. Gonzalez.

## 2023-09-10 NOTE — ED PROVIDER NOTE - PHYSICAL EXAMINATION
MSK: no spinal or paraspinal tenderness. +ttp to left hip. able to lift b/l lower and upper extremities.

## 2023-09-10 NOTE — CONSULT NOTE ADULT - SUBJECTIVE AND OBJECTIVE BOX
***INCOMPLETE NOTE***  Kel Roldan MD  Cardiology Fellow  All Cardiology service information can be found 24/7 on [email or web address], password: [email or web address]    Patient seen and evaluated at bedside    Chief Complaint:    HPI:  78F PMH/PSH including NICM, CHF (EF 50-55 on note from 4/17/23), PPM, Aflutter on Eliquis, COPD, asthma (Ventolin, Breztri) complicated by respiratory failure (trach and PEG in past), GI bleed, perforated diverticulitis, s/p Ex Lap and Lupe's (7/2019), s/p L hemicolectomy and end colostomy, subcutaneous fistula, DM2, presents to the ER for dizziness. Reports dizziness is intermittent usually occur when standing, started few days ago. Pt w/PPM, c/f pacemaker battery. Few days ago attempted to sit on chair w/wheels (computer chair), chair moved out from under her, she fell hit her knee and side, "it all happened so fast" , don't think she hit her head but is not sure,  she feels fine now.      Reports she's recovering from being COVID+  few weeks ago, still have residual wet cough     ROS: Denies CP, SOB, palpitation, N/V/D, fever, chills, recent travel, change in bowel and urinary habits     A 10-system ROS was performed and is negative except as noted above and/or in the HPI.   (10 Sep 2023 21:25)    Patient seen and examined with daughter at bedside. Patient reports she began getting dizzy spells for the past 1-2 days that are associated with sweating. She is a patient of Dr. Herrera, and was due to have her PPM checked next week, as it is nearing EOL. Due to concerns about her dizziness being related to the pacemaker she came to the ED. She reports that when she fell, it was a mechanical fall and did not hit her head. She did not lose consciousness, have any dizziness, chest pain, palpitations, or SOB at that time. She denies any shock from her ICD.      PMHx:   Asthma    Congestive Heart Failure    Diabetes    Gout    HTN - Hypertension    Cardiac Pacemaker    COPD (chronic obstructive pulmonary disease)    Kidney stone    Cardiomyopathy    Diverticulitis    Atrial flutter    Vertigo    Patient is Restorationism    Refusal of blood transfusions as patient is Restorationism        PSHx:   S/P Cholecystectomy    AICD (Automatic Cardioverter/Defibrillator) Present    S/P cholecystectomy    Status post left hemicolectomy        Allergies:  Coreg (Other)  digoxin (Other; Short breath (Mild to Mod))  penicillins (Hives)  metoprolol (Other)  Solu-Medrol (Other (Mild to Mod))  Lopressor (Unknown)      Home Meds:    Current Medications:   acetaminophen     Tablet .. 650 milliGRAM(s) Oral every 6 hours PRN  albuterol    90 MICROgram(s) HFA Inhaler 1 Puff(s) Inhalation every 4 hours PRN  albuterol/ipratropium for Nebulization 3 milliLiter(s) Nebulizer every 6 hours  aluminum hydroxide/magnesium hydroxide/simethicone Suspension 30 milliLiter(s) Oral every 4 hours PRN  apixaban 5 milliGRAM(s) Oral every 12 hours  atorvastatin 20 milliGRAM(s) Oral at bedtime  budesonide  80 MICROgram(s)/formoterol 4.5 MICROgram(s) Inhaler 2 Puff(s) Inhalation two times a day  dextrose 5%. 1000 milliLiter(s) IV Continuous <Continuous>  dextrose 5%. 1000 milliLiter(s) IV Continuous <Continuous>  dextrose 50% Injectable 25 Gram(s) IV Push once  dextrose 50% Injectable 12.5 Gram(s) IV Push once  dextrose 50% Injectable 25 Gram(s) IV Push once  dextrose Oral Gel 15 Gram(s) Oral once PRN  folic acid 1 milliGRAM(s) Oral daily  furosemide    Tablet 40 milliGRAM(s) Oral two times a day  glucagon  Injectable 1 milliGRAM(s) IntraMuscular once  guaiFENesin  milliGRAM(s) Oral every 12 hours  insulin glargine Injectable (LANTUS) 10 Unit(s) SubCutaneous at bedtime  insulin lispro (ADMELOG) corrective regimen sliding scale   SubCutaneous three times a day before meals  insulin lispro (ADMELOG) corrective regimen sliding scale   SubCutaneous at bedtime  melatonin 3 milliGRAM(s) Oral at bedtime PRN  montelukast 10 milliGRAM(s) Oral daily  ondansetron Injectable 4 milliGRAM(s) IV Push every 8 hours PRN  pantoprazole    Tablet 40 milliGRAM(s) Oral before breakfast  repaglinide 0.5 milliGRAM(s) Oral three times a day before meals  spironolactone 25 milliGRAM(s) Oral two times a day  tiotropium 2.5 MICROgram(s) Inhaler 2 Puff(s) Inhalation daily      FAMILY HISTORY:  Family history of brain tumor (Father)        Social History:  Smoking History:  Alcohol Use:  Drug Use:    REVIEW OF SYSTEMS:  CONSTITUTIONAL: No weakness, fevers or chills  EYES/ENT: No visual changes;  No dysphagia  NECK: No pain or stiffness  RESPIRATORY: No cough, wheezing, hemoptysis; No shortness of breath  CARDIOVASCULAR: No chest pain or palpitations; No lower extremity edema  GASTROINTESTINAL: No abdominal or epigastric pain. No nausea, vomiting, or hematemesis; No diarrhea or constipation. No melena or hematochezia.  BACK: No back pain  GENITOURINARY: No dysuria, frequency or hematuria  NEUROLOGICAL: No numbness or weakness  SKIN: No itching, burning, rashes, or lesions   All other review of systems is negative unless indicated above.    Physical Exam:  T(F): 98.4 (09-10), Max: 98.4 (09-10)  HR: 101 (09-10) (85 - 101)  BP: 128/73 (09-10) (119/85 - 130/81)  RR: 18 (09-10)  SpO2: 95% (09-10)  GENERAL: No acute distress, well-developed  HEAD:  Atraumatic, Normocephalic  ENT: EOMI, PERRLA, conjunctiva and sclera clear, Neck supple, No JVD, moist mucosa  CHEST/LUNG: Clear to auscultation bilaterally; No wheeze, equal breath sounds bilaterally   BACK: No spinal tenderness  HEART: Regular rate and rhythm; No murmurs, rubs, or gallops  ABDOMEN: Soft, Nontender, Nondistended; Bowel sounds present  EXTREMITIES:  No clubbing, cyanosis, or edema  PSYCH: Nl behavior, nl affect  NEUROLOGY: AAOx3, non-focal, cranial nerves intact  SKIN: Normal color, No rashes or lesions  LINES:    Cardiovascular Diagnostic Testing:    ECG: Personally reviewed:    Echo: Personally reviewed:    Stress Testing:    Cath:    Imaging:    CXR: Personally reviewed    Labs: Personally reviewed                        12.3   9.08  )-----------( 189      ( 10 Sep 2023 14:41 )             37.6     09-10    137  |  99  |  21  ----------------------------<  117<H>  3.9   |  23  |  1.05    Ca    9.9      10 Sep 2023 14:41  Mg     1.7     09-10    TPro  8.0  /  Alb  3.8  /  TBili  0.5  /  DBili  x   /  AST  15  /  ALT  11  /  AlkPhos  101  09-10    PT/INR - ( 10 Sep 2023 14:41 )   PT: 14.6 sec;   INR: 1.40 ratio         PTT - ( 10 Sep 2023 14:41 )  PTT:32.7 sec    CARDIAC MARKERS ( 10 Sep 2023 14:41 )  20 ng/L / x     / x     / x     / x     / x                     Kel Roldan MD  Cardiology Fellow  All Cardiology service information can be found 24/7 on [email or web address], password: [email or web address]    Patient seen and evaluated at bedside    Chief Complaint:    HPI:  78F PMH/PSH including NICM, CHF (EF 50-55 on note from 4/17/23), CRT-D, Aflutter on Eliquis, COPD, asthma (Ventolin, Breztri) complicated by respiratory failure (trach and PEG in past), GI bleed, perforated diverticulitis, s/p Ex Lap and Lupe's (7/2019), s/p L hemicolectomy and end colostomy, subcutaneous fistula, DM2, presents to the ER for dizziness. Reports dizziness is intermittent usually occur when standing, started few days ago. Pt w/PPM, c/f pacemaker battery. Few days ago attempted to sit on chair w/wheels (computer chair), chair moved out from under her, she fell hit her knee and side, "it all happened so fast" , don't think she hit her head but is not sure,  she feels fine now.      Reports she's recovering from being COVID+  few weeks ago, still have residual wet cough     ROS: Denies CP, SOB, palpitation, N/V/D, fever, chills, recent travel, change in bowel and urinary habits     A 10-system ROS was performed and is negative except as noted above and/or in the HPI.   (10 Sep 2023 21:25)    Patient seen and examined with daughter at bedside. Patient reports she began getting dizzy spells for the past 1-2 days that are associated with sweating. She is a patient of Dr. Herrera, and was due to have her PPM checked next week, as it is nearing EOL. Due to concerns about her dizziness being related to the pacemaker she came to the ED. She reports that when she fell, it was a mechanical fall and did not hit her head. She did not lose consciousness, have any dizziness, chest pain, palpitations, or SOB at that time. She denies any shock from her ICD. Patient noted to have several runs of NSVT in ED, longest about 11s. Cardiology consulted to evaluate CRT-D.       PMHx:   Asthma    Congestive Heart Failure    Diabetes    Gout    HTN - Hypertension    Cardiac Pacemaker    COPD (chronic obstructive pulmonary disease)    Kidney stone    Cardiomyopathy    Diverticulitis    Atrial flutter    Vertigo    Patient is Sabianist    Refusal of blood transfusions as patient is Sabianist        PSHx:   S/P Cholecystectomy    AICD (Automatic Cardioverter/Defibrillator) Present    S/P cholecystectomy    Status post left hemicolectomy        Allergies:  Coreg (Other)  digoxin (Other; Short breath (Mild to Mod))  penicillins (Hives)  metoprolol (Other)  Solu-Medrol (Other (Mild to Mod))  Lopressor (Unknown)      Home Meds:    Current Medications:   acetaminophen     Tablet .. 650 milliGRAM(s) Oral every 6 hours PRN  albuterol    90 MICROgram(s) HFA Inhaler 1 Puff(s) Inhalation every 4 hours PRN  albuterol/ipratropium for Nebulization 3 milliLiter(s) Nebulizer every 6 hours  aluminum hydroxide/magnesium hydroxide/simethicone Suspension 30 milliLiter(s) Oral every 4 hours PRN  apixaban 5 milliGRAM(s) Oral every 12 hours  atorvastatin 20 milliGRAM(s) Oral at bedtime  budesonide  80 MICROgram(s)/formoterol 4.5 MICROgram(s) Inhaler 2 Puff(s) Inhalation two times a day  dextrose 5%. 1000 milliLiter(s) IV Continuous <Continuous>  dextrose 5%. 1000 milliLiter(s) IV Continuous <Continuous>  dextrose 50% Injectable 25 Gram(s) IV Push once  dextrose 50% Injectable 12.5 Gram(s) IV Push once  dextrose 50% Injectable 25 Gram(s) IV Push once  dextrose Oral Gel 15 Gram(s) Oral once PRN  folic acid 1 milliGRAM(s) Oral daily  furosemide    Tablet 40 milliGRAM(s) Oral two times a day  glucagon  Injectable 1 milliGRAM(s) IntraMuscular once  guaiFENesin  milliGRAM(s) Oral every 12 hours  insulin glargine Injectable (LANTUS) 10 Unit(s) SubCutaneous at bedtime  insulin lispro (ADMELOG) corrective regimen sliding scale   SubCutaneous three times a day before meals  insulin lispro (ADMELOG) corrective regimen sliding scale   SubCutaneous at bedtime  melatonin 3 milliGRAM(s) Oral at bedtime PRN  montelukast 10 milliGRAM(s) Oral daily  ondansetron Injectable 4 milliGRAM(s) IV Push every 8 hours PRN  pantoprazole    Tablet 40 milliGRAM(s) Oral before breakfast  repaglinide 0.5 milliGRAM(s) Oral three times a day before meals  spironolactone 25 milliGRAM(s) Oral two times a day  tiotropium 2.5 MICROgram(s) Inhaler 2 Puff(s) Inhalation daily      FAMILY HISTORY:  Family history of brain tumor (Father)        Social History:  Smoking History: Denies  Alcohol Use:  Drug Use:    REVIEW OF SYSTEMS:  CONSTITUTIONAL: +weakness  RESPIRATORY: +cough, No wheezing, hemoptysis; No shortness of breath  CARDIOVASCULAR: No chest pain or palpitations; No lower extremity edema  NEUROLOGICAL: No numbness or weakness  SKIN: No itching, burning, rashes, or lesions   All other review of systems is negative unless indicated above.    Physical Exam:  T(F): 98.4 (09-10), Max: 98.4 (09-10)  HR: 101 (09-10) (85 - 101)  BP: 128/73 (09-10) (119/85 - 130/81)  RR: 18 (09-10)  SpO2: 95% (09-10)  GENERAL: No acute distress, well-developed  HEAD:  Atraumatic, Normocephalic  ENT: EOMI, PERRLA, conjunctiva and sclera clear, Neck supple, No JVD, moist mucosa  CHEST/LUNG: Clear to auscultation bilaterally; No wheeze, equal breath sounds bilaterally   BACK: No spinal tenderness  HEART: Regular rate and rhythm; No murmurs, rubs, or gallops. L chest wall ICD in place  EXTREMITIES:  No clubbing, cyanosis, or edema. Legs tender to palpation  PSYCH: Nl behavior, nl affect  NEUROLOGY: AAOx3, non-focal, cranial nerves intact  SKIN: Normal color, No rashes or lesions  LINES:    Cardiovascular Diagnostic Testing:    ECG: Personally reviewed:  A-sensed V-paced  Telemetry with mulitple runs of NSVT, longest about 11s.      Echo: Personally reviewed:    Stress Testing:    Cath:  < from: Cardiac Cath Lab - Adult (03.08.18 @ 12:45) >  CORONARY VESSELS: The coronary circulation is right dominant.  LM:   --  LM: Normal.  LAD:   --  LAD: Normal.  CX:   --  Circumflex: Normal.  RCA:   --  RCA: Normal.    < end of copied text >    Imaging:    CXR: Personally reviewed    Labs: Personally reviewed                        12.3   9.08  )-----------( 189      ( 10 Sep 2023 14:41 )             37.6     09-10    137  |  99  |  21  ----------------------------<  117<H>  3.9   |  23  |  1.05    Ca    9.9      10 Sep 2023 14:41  Mg     1.7     09-10    TPro  8.0  /  Alb  3.8  /  TBili  0.5  /  DBili  x   /  AST  15  /  ALT  11  /  AlkPhos  101  09-10    PT/INR - ( 10 Sep 2023 14:41 )   PT: 14.6 sec;   INR: 1.40 ratio         PTT - ( 10 Sep 2023 14:41 )  PTT:32.7 sec    CARDIAC MARKERS ( 10 Sep 2023 14:41 )  20 ng/L / x     / x     / x     / x     / x

## 2023-09-10 NOTE — CONSULT NOTE ADULT - ASSESSMENT
78F PMH/PSH including NICM, CHF (EF 50-55 on note from 4/17/23), CRT-D, Aflutter on Eliquis, COPD, asthma (Ventolin, Breztri) complicated by respiratory failure (trach and PEG in past), GI bleed, perforated diverticulitis, s/p Ex Lap and Lupe's (7/2019), s/p L hemicolectomy and end colostomy, subcutaneous fistula, DM2, presents to the ER for dizziness. EP consulted for CRT-D evaluation.    #Dizziness  - Episodes of dizziness ongoing for 1-2 days do not appear on CRT-D interrogation to be related to any malfunction, however the patient has been noted on telemetry to have NSVT up to rates of 130, which is below the monitor zone for this device. It is possible that these episodes are causing the patient's symptoms, however further workup into other causes should be considered  - Would not give medication to treat the NSVT episodes unless symptoms are found to correlate with episodes  - Per patient and daughter, as well as outpatient records, no TTE since 2021. Would obtain TTE.    #NICM s/p CRT-D  - Appears to be functioning properly, however battery nearing end of life in 2 months. Will discuss with EP attending timing of generator change  - TTE as above  - Reportedly intolerant of BB due to severe bronchospastic disease    ***Note not finalized until co-signed by attending***     Kel Roldan MD  Cardiology Fellow  All Cardiology service information can be found 24/7 on [email or web address], password: [facility]  78F PMH/PSH including NICM, CHF (EF 50-55 on note from 4/17/23), CRT-D, Aflutter on Eliquis, COPD, asthma (Ventolin, Breztri) complicated by respiratory failure (trach and PEG in past), GI bleed, perforated diverticulitis, s/p Ex Lap and Lupe's (7/2019), s/p L hemicolectomy and end colostomy, subcutaneous fistula, DM2, presents to the ER for dizziness. EP consulted for CRT-D evaluation.    #Dizziness  - Episodes of dizziness ongoing for 1-2 days do not appear on CRT-D interrogation to be related to any malfunction, however the patient has been noted on telemetry to have NSVT up to rates of 130, which is below the monitor zone for this device. It is possible that these episodes are causing the patient's symptoms, however further workup into other causes should be considered  - Would not give medication to treat the NSVT episodes unless symptoms are found to correlate with episodes  - Monitor on telemetry  - Per patient and daughter, as well as outpatient records, no TTE since 2021. Would obtain TTE.    #NICM s/p CRT-D  - Appears to be functioning properly, however battery nearing end of life in 2 months. Will discuss with EP attending timing of generator change  - TTE as above  - Reportedly intolerant of BB due to severe bronchospastic disease    ***Note not finalized until co-signed by attending***     Kel Roldan MD  Cardiology Fellow  All Cardiology service information can be found 24/7 on [email or web address], password: [email or web address]

## 2023-09-10 NOTE — ED ADULT TRIAGE NOTE - CHIEF COMPLAINT QUOTE
pacemaker battery is low, was supposed to have a remote visit on the 19th but due to resolved dizziness came to ED today for interrogation/ have pacemaker checked.

## 2023-09-10 NOTE — ED PROVIDER NOTE - OBJECTIVE STATEMENT
78F with PMH/PSH including NICM, CHF (EF 50-55 on note from 4/17/23), PPM, Aflutter on Eliquis, COPD, asthma (Ventolin, Breztri) complicated by respiratory failure (trach and PEG in past), GI bleed, perforated diverticulitis, s/p Ex Lap and Lupe's (7/2019), s/p L hemicolectomy and end colostomy, subcutaneous fistula, DM2, presents to the ER for dizziness. states started a few days prior and has been intermittent. patient and daughter state they were concerned maybe issue with pacemaker battery. states also a few days prior had unwitnessed fall. does not believe she hit her head. states slipped out of wheel chair. denies LOC. denies f/n/v/d, CP, SOB, cough, urinary symptoms, abdominal pain.

## 2023-09-10 NOTE — H&P ADULT - PROBLEM SELECTOR PLAN 3
- Clinically euvolemic   - EKG:  AV-paced   - c/w diuretic   - I&O, daily wt   - telemetry   - check echo  - cardio consult in ED, f/u recs   - C/w home meds

## 2023-09-10 NOTE — H&P ADULT - PROBLEM SELECTOR PLAN 1
- Having intermittent sensation of room spinning & lightheaded when standing  - Denies CP, SOB, palpitation, vision change, hearing loss   - EKG A-V paced   - Trop neg, Bnp neg   - Telemetry monitor    - Check orthostatic   - On multiple diuretics iso cardiac hx, could consider med adjustment as tolerated, need to coordinate w/cardio  - Encourage adequate intake, change position slowly    - Pt w/ PPM, pt & family concerned there might be battery problem / PPM issues   - Cardio / EP consult for pacemaker interrogation and further eval  - Cardio consulted in ED, f/u rec

## 2023-09-10 NOTE — ED ADULT NURSE NOTE - NSFALLHARMRISKINTERV_ED_ALL_ED

## 2023-09-10 NOTE — H&P ADULT - NSHPLABSRESULTS_GEN_ALL_CORE
12.3   9.08  )-----------( 189      ( 10 Sep 2023 14:41 )             37.6       09-10    137  |  99  |  21  ----------------------------<  117<H>  3.9   |  23  |  1.05    Ca    9.9      10 Sep 2023 14:41  Mg     1.7     09-10    TPro  8.0  /  Alb  3.8  /  TBili  0.5  /  DBili  x   /  AST  15  /  ALT  11  /  AlkPhos  101  09-10      Troponin T, High Sensitivity Result: 20:  (09.10.23 @ 14:41)    Pro-Brain Natriuretic Peptide: 206 pg/mL (09.10.23 @ 14:41)        - - - - - - - - - - - - - - - - - - - - - - - - - - - - - - - - - - - - - - - - - - - - - - - - - - - -       EKG personally reviewed:  AV paced 70 bpm     Images personally reviewed:     < from: Xray Chest 1 View AP/PA (09.10.23 @ 15:34) >  IMPRESSION: No acute intrathoracic pathology    < from: Xray Hip w/ Pelvis 1 View, Left (09.10.23 @ 15:35) >  IMPRESSION: No acute displaced fracture or dislocation.

## 2023-09-10 NOTE — H&P ADULT - NSICDXPASTSURGICALHX_GEN_ALL_CORE_FT
PAST SURGICAL HISTORY:  AICD (Automatic Cardioverter/Defibrillator) Present inserted in Aug, 2008. Due for battery change in 1 month. ( [employer]) . Inserted by Dr Duffy    S/P cholecystectomy     Status post left hemicolectomy

## 2023-09-10 NOTE — ED ADULT NURSE REASSESSMENT NOTE - NS ED NURSE REASSESS COMMENT FT1
Tele tech called to notify team that patient sustained 27 beats of widened QRS complex. Patient asymptomatic. EKG printout given to MD Gonzalez.

## 2023-09-10 NOTE — ED PROVIDER NOTE - ATTENDING APP SHARED VISIT CONTRIBUTION OF CARE
Hx: pt with intermittent dizzy spells for few days, here for eval.  No fever, cp, sob.  No diplopia, dysphagia, dysarthria, ataxia, focal weakness in arm or leg.     PE: well appearing, nontoxic, no respiratory distress, neuro nonfocal, clear lungs.        MDM: pt with intermittent dizzy spells, risk for arrythmia, tele monitoring showing paroxysmal atrial tachycardia, consider malignant arrythmias as well, will monitor, check cbc r/o anemia or leukocytosis, check bmp to r/o metabolic derangement and lyte imbalance, cardiologist consult, consider interrogation of pacemaker, pt may require inpatient for EP eval and treatment of arrythmia.

## 2023-09-10 NOTE — H&P ADULT - PROBLEM SELECTOR PLAN 4
- C/w Montelukast (home med)  - Home med Breztri (non-formulary) -->> Symbicort & Spiriva while admitted   - Albuterol prn   - Duo neb

## 2023-09-10 NOTE — H&P ADULT - ASSESSMENT
78F PMH/PSH including NICM, CHF (EF 50-55 on note from 4/17/23), PPM, Aflutter on Eliquis, COPD, asthma (Ventolin, Breztri) complicated by respiratory failure (trach and PEG in past), GI bleed, perforated diverticulitis, s/p Ex Lap and Lupe's (7/2019), s/p L hemicolectomy and end colostomy, subcutaneous fistula, DM2, presents fordizziness being admitted for further eval

## 2023-09-10 NOTE — H&P ADULT - HISTORY OF PRESENT ILLNESS
78F PMH/PSH including NICM, CHF (EF 50-55 on note from 4/17/23), PPM, Aflutter on Eliquis, COPD, asthma (Ventolin, Breztri) complicated by respiratory failure (trach and PEG in past), GI bleed, perforated diverticulitis, s/p Ex Lap and Lupe's (7/2019), s/p L hemicolectomy and end colostomy, subcutaneous fistula, DM2, presents to the ER for dizziness. Reports dizziness is intermittent usually occur when standing, started few days ago. Pt w/PPM, c/f pacemaker battery. Few days ago attempted to sit on chair w/wheels (computer chair), chair moved out from under her, she fell hit her knee and side, "it all happened so fast" , don't think she hit her head but is not sure,  she feels fine now.      Reports she's recovering from being COVID+  few weeks ago, still have residual wet cough     ROS: Denies CP, SOB, palpitation, N/V/D, fever, chills, recent travel, change in bowel and urinary habits     A 10-system ROS was performed and is negative except as noted above and/or in the HPI.

## 2023-09-10 NOTE — H&P ADULT - NSHPPHYSICALEXAM_GEN_ALL_CORE
T(C): 36.3 (09-10-23 @ 19:22), Max: 36.7 (09-10-23 @ 17:56)  HR: 85 (09-10-23 @ 19:22) (85 - 96)  BP: 130/81 (09-10-23 @ 19:22) (119/85 - 130/81)  RR: 18 (09-10-23 @ 19:22) (15 - 20)  SpO2: 95% (09-10-23 @ 19:22) (95% - 99%)    CONSTITUTIONAL: Well groomed, no apparent distress  EYES: PERRLA and symmetric, EOMI  ENMT: MMM. Normal dentition  RESP: No respiratory distress, fine end expiratory wheeze  CV: +S1S2, RRR; no peripheral edema  GI: Soft, NTND, Ostomy pouch, abm dressing covering abm fistula  MSK: normal pain free ROM x4 extremities   SKIN: No rashes or ulcers noted  NEURO: CN II-XII grossly intact; normal reflexes, sensation intact throughout   PSYCH: Appropriate insight/judgment; A+O x 3, mood and affect appropriate

## 2023-09-10 NOTE — ED PROVIDER NOTE - NS ED ATTENDING STATEMENT MOD
This was a shared visit with the NOEMY. I reviewed and verified the documentation and independently performed the documented:

## 2023-09-10 NOTE — ED ADULT NURSE REASSESSMENT NOTE - NS ED NURSE REASSESS COMMENT FT1
Tele tech called to notify team that patient had sustained widened QRS complexes of 17 beats. Print out received and given to MD Norris. Patient denies any complaints, resting comfortably in bed watching videos on phone.

## 2023-09-11 LAB
A1C WITH ESTIMATED AVERAGE GLUCOSE RESULT: 7.9 % — HIGH (ref 4–5.6)
ANION GAP SERPL CALC-SCNC: 16 MMOL/L — SIGNIFICANT CHANGE UP (ref 5–17)
BLD GP AB SCN SERPL QL: NEGATIVE — SIGNIFICANT CHANGE UP
BUN SERPL-MCNC: 20 MG/DL — SIGNIFICANT CHANGE UP (ref 7–23)
CALCIUM SERPL-MCNC: 9.4 MG/DL — SIGNIFICANT CHANGE UP (ref 8.4–10.5)
CHLORIDE SERPL-SCNC: 97 MMOL/L — SIGNIFICANT CHANGE UP (ref 96–108)
CO2 SERPL-SCNC: 24 MMOL/L — SIGNIFICANT CHANGE UP (ref 22–31)
CREAT SERPL-MCNC: 1 MG/DL — SIGNIFICANT CHANGE UP (ref 0.5–1.3)
EGFR: 58 ML/MIN/1.73M2 — LOW
ESTIMATED AVERAGE GLUCOSE: 180 MG/DL — HIGH (ref 68–114)
GLUCOSE BLDC GLUCOMTR-MCNC: 112 MG/DL — HIGH (ref 70–99)
GLUCOSE BLDC GLUCOMTR-MCNC: 177 MG/DL — HIGH (ref 70–99)
GLUCOSE BLDC GLUCOMTR-MCNC: 194 MG/DL — HIGH (ref 70–99)
GLUCOSE BLDC GLUCOMTR-MCNC: 81 MG/DL — SIGNIFICANT CHANGE UP (ref 70–99)
GLUCOSE SERPL-MCNC: 177 MG/DL — HIGH (ref 70–99)
HCT VFR BLD CALC: 37.5 % — SIGNIFICANT CHANGE UP (ref 34.5–45)
HGB BLD-MCNC: 12.3 G/DL — SIGNIFICANT CHANGE UP (ref 11.5–15.5)
INR BLD: 1.48 RATIO — HIGH (ref 0.85–1.18)
MCHC RBC-ENTMCNC: 31.9 PG — SIGNIFICANT CHANGE UP (ref 27–34)
MCHC RBC-ENTMCNC: 32.8 GM/DL — SIGNIFICANT CHANGE UP (ref 32–36)
MCV RBC AUTO: 97.4 FL — SIGNIFICANT CHANGE UP (ref 80–100)
NRBC # BLD: 0 /100 WBCS — SIGNIFICANT CHANGE UP (ref 0–0)
PLATELET # BLD AUTO: 203 K/UL — SIGNIFICANT CHANGE UP (ref 150–400)
POTASSIUM SERPL-MCNC: 3.6 MMOL/L — SIGNIFICANT CHANGE UP (ref 3.5–5.3)
POTASSIUM SERPL-SCNC: 3.6 MMOL/L — SIGNIFICANT CHANGE UP (ref 3.5–5.3)
PROTHROM AB SERPL-ACNC: 16.1 SEC — HIGH (ref 9.5–13)
RBC # BLD: 3.85 M/UL — SIGNIFICANT CHANGE UP (ref 3.8–5.2)
RBC # FLD: 13.6 % — SIGNIFICANT CHANGE UP (ref 10.3–14.5)
RH IG SCN BLD-IMP: POSITIVE — SIGNIFICANT CHANGE UP
SODIUM SERPL-SCNC: 137 MMOL/L — SIGNIFICANT CHANGE UP (ref 135–145)
WBC # BLD: 9.47 K/UL — SIGNIFICANT CHANGE UP (ref 3.8–10.5)
WBC # FLD AUTO: 9.47 K/UL — SIGNIFICANT CHANGE UP (ref 3.8–10.5)

## 2023-09-11 PROCEDURE — 93292 WCD DEVICE INTERROGATE: CPT | Mod: 26

## 2023-09-11 PROCEDURE — 93284 PRGRMG EVAL IMPLANTABLE DFB: CPT | Mod: 26

## 2023-09-11 RX ADMIN — Medication 3 MILLILITER(S): at 00:30

## 2023-09-11 RX ADMIN — Medication 40 MILLIGRAM(S): at 15:40

## 2023-09-11 RX ADMIN — INSULIN GLARGINE 10 UNIT(S): 100 INJECTION, SOLUTION SUBCUTANEOUS at 22:02

## 2023-09-11 RX ADMIN — Medication 600 MILLIGRAM(S): at 06:00

## 2023-09-11 RX ADMIN — Medication 3 MILLILITER(S): at 17:57

## 2023-09-11 RX ADMIN — MONTELUKAST 10 MILLIGRAM(S): 4 TABLET, CHEWABLE ORAL at 12:15

## 2023-09-11 RX ADMIN — BUDESONIDE AND FORMOTEROL FUMARATE DIHYDRATE 2 PUFF(S): 160; 4.5 AEROSOL RESPIRATORY (INHALATION) at 05:59

## 2023-09-11 RX ADMIN — Medication 600 MILLIGRAM(S): at 18:13

## 2023-09-11 RX ADMIN — APIXABAN 5 MILLIGRAM(S): 2.5 TABLET, FILM COATED ORAL at 06:00

## 2023-09-11 RX ADMIN — Medication 3 MILLILITER(S): at 05:59

## 2023-09-11 RX ADMIN — INSULIN GLARGINE 10 UNIT(S): 100 INJECTION, SOLUTION SUBCUTANEOUS at 00:04

## 2023-09-11 RX ADMIN — Medication 3 MILLILITER(S): at 12:16

## 2023-09-11 RX ADMIN — ATORVASTATIN CALCIUM 20 MILLIGRAM(S): 80 TABLET, FILM COATED ORAL at 00:04

## 2023-09-11 RX ADMIN — Medication 1: at 12:13

## 2023-09-11 RX ADMIN — Medication 1 MILLIGRAM(S): at 12:15

## 2023-09-11 RX ADMIN — SPIRONOLACTONE 25 MILLIGRAM(S): 25 TABLET, FILM COATED ORAL at 06:00

## 2023-09-11 RX ADMIN — REPAGLINIDE 0.5 MILLIGRAM(S): 1 TABLET ORAL at 17:47

## 2023-09-11 RX ADMIN — ATORVASTATIN CALCIUM 20 MILLIGRAM(S): 80 TABLET, FILM COATED ORAL at 21:37

## 2023-09-11 RX ADMIN — Medication 40 MILLIGRAM(S): at 06:00

## 2023-09-11 RX ADMIN — PANTOPRAZOLE SODIUM 40 MILLIGRAM(S): 20 TABLET, DELAYED RELEASE ORAL at 06:01

## 2023-09-11 RX ADMIN — REPAGLINIDE 0.5 MILLIGRAM(S): 1 TABLET ORAL at 06:01

## 2023-09-11 RX ADMIN — REPAGLINIDE 0.5 MILLIGRAM(S): 1 TABLET ORAL at 14:59

## 2023-09-11 RX ADMIN — SPIRONOLACTONE 25 MILLIGRAM(S): 25 TABLET, FILM COATED ORAL at 17:57

## 2023-09-11 NOTE — PROCEDURE NOTE - NSINTBATTERYSTAT_CARD_ALL_CORE
Show Spray Paint Technique Variable?: Yes Spray Paint Text: The liquid nitrogen was applied to the skin utilizing a spray paint frosting technique. Post-Care Instructions: I reviewed with the patient in detail post-care instructions. Patient is to wear sunprotection, and avoid picking at any of the treated lesions. Pt may apply Vaseline to crusted or scabbing areas. Duration Of Freeze Thaw-Cycle (Seconds): 5 Medical Necessity Clause: This procedure was medically necessary because the lesions that were treated were: Consent: The patient's consent was obtained including but not limited to risks of crusting, scabbing, blistering, scarring, darker or lighter pigmentary change, recurrence, incomplete removal and infection. Spray Paint Technique: No Number Of Freeze-Thaw Cycles: 1 freeze-thaw cycle Medical Necessity Information: It is in your best interest to select a reason for this procedure from the list below. All of these items fulfill various CMS LCD requirements except the new and changing color options. Detail Level: Detailed [facility]

## 2023-09-11 NOTE — PHYSICAL THERAPY INITIAL EVALUATION ADULT - PERTINENT HX OF CURRENT PROBLEM, REHAB EVAL
78 y/oF admitted 9/10 p/w intermittent dizziness, usually when standing. A few days pta pt attempted to sit on chair w/wheels (computer chair), chair moved out from under her, she fell hit her knee and side. As per EP, do not seem to be related to malfunctionof CRT-D. PMH/PSH NICM, CHF (EF 50-55 on note from 4/17/23), PPM, Aflutter on Eliquis, COPD, asthma (Ventolin, Breztri) complicated by respiratory failure (trach and PEG in past), GI bleed, perforated diverticulitis, s/p Ex Lap and Lupe's (7/2019), s/p L hemicolectomy and end colostomy, subcutaneous fistula, DM2

## 2023-09-11 NOTE — PATIENT PROFILE ADULT - FALL HARM RISK - HARM RISK INTERVENTIONS
Assistance with ambulation/Assistance OOB with selected safe patient handling equipment/Communicate Risk of Fall with Harm to all staff/Discuss with provider need for PT consult/Monitor gait and stability/Provide patient with walking aids - walker, cane, crutches/Reinforce activity limits and safety measures with patient and family/Sit up slowly, dangle for a short time, stand at bedside before walking/Tailored Fall Risk Interventions/Visual Cue: Yellow wristband and red socks/Bed in lowest position, wheels locked, appropriate side rails in place/Call bell, personal items and telephone in reach/Instruct patient to call for assistance before getting out of bed or chair/Non-slip footwear when patient is out of bed/Grants to call system/Physically safe environment - no spills, clutter or unnecessary equipment/Purposeful Proactive Rounding/Room/bathroom lighting operational, light cord in reach

## 2023-09-11 NOTE — PROGRESS NOTE ADULT - ASSESSMENT
78F PMH/PSH including NICM, CHF (EF 50-55 on note from 4/17/23, stable from TTE 2021), s/up upgrade to CRT-D by Dr. 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 States she attempted to sit on chair with wheels when it moved out from under her, causing her to fall. Denies LOC or head trauma. She denies syncope, pre syncope. Endorses occasional episodes recently of being "dizzy in the heat." Denies dizziness w/ changes of position, chest pain or tightness, palpitations, SOB/SILVA. Device interrogation performed revealing it is at [facility].     1) NICM s/p CRT-D, now at [facility]  2) NSVT   3) pAFL    - NPO s/p mn for generator change 9/12   - Hold Eliquis 9/12 AM   - Patient w/ NSVT noted on device interrogation, currently having episodes of NSVT on tele, though is asymptomatic. She has not been on BB 2/2 c/f bronchospasm   - Monitor on telemetry  - Per patient and daughter, as well as outpatient records, no TTE since 2021. Would obtain TTE     78F PMH/PSH including NICM, CHF (EF 50-55 on note from 4/17/23, stable from TTE 2021), s/up upgrade to CRT-D by Dr. 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 States she attempted to sit on chair with wheels when it moved out from under her, causing her to fall. Denies LOC or head trauma. She denies syncope, pre syncope. Endorses occasional episodes recently of being "dizzy in the heat." Denies dizziness w/ changes of position, chest pain or tightness, palpitations, SOB/SILVA. Device interrogation performed revealing it is at [facility].     1) NICM s/p CRT-D, now at [facility]  2) NSVT   3) pAFL    - NPO s/p mn for generator change 9/12   - Hold Eliquis 9/11PM and  9/12 AM prior to procedure. Do not use Heparin/Lovenox SQ pre/post procedure to prevent pocket hematoma. EP to guide resuming AC post procedure   - Maintain active T&S, two sets ordered   - Patient w/ NSVT noted on device interrogation, currently having episodes of NSVT on tele, though is asymptomatic. She has not been on BB 2/2 c/f bronchospasm. BiV pacing percentage 99%    - Monitor on telemetry  - Per patient and daughter, as well as outpatient records, no TTE since 2021. Would obtain TTE

## 2023-09-11 NOTE — PHYSICAL THERAPY INITIAL EVALUATION ADULT - ADDITIONAL COMMENTS
Pt lives on 1st floor of 2 family house, 6 stairs to enter with ALEKSANDR rails. Ambulates at home with no device, has rollator for longer distances. States is able to negotiate stairs independently, takes her time when doing it. Owns three in one commode, rollator, wheelchair, grab bars in the bathroom/shower. Placed a chair in the shower, sometimes uses it but also stands to shower. Daughter lives upstairs and assists as needed.

## 2023-09-11 NOTE — PHYSICAL THERAPY INITIAL EVALUATION ADULT - LEVEL OF INDEPENDENCE: GAIT, REHAB EVAL
bib wife, ryan since april 80 mg,  hydroxyzine 50 tid, pt states bipolar, c/o pain and headache, and SI if "pain won't go away " behavioural change, pt appears anxious and very restless
independent

## 2023-09-11 NOTE — PROCEDURE NOTE - ADDITIONAL PROCEDURE DETAILS
Patient having non-sustained ventricular tachycardia to 130s on telemetry, not noted on device as the monitor zone is above this value.
BiV pacing percentage 99.5% from 6/15/23 - 9/10/2023

## 2023-09-12 LAB
ANION GAP SERPL CALC-SCNC: 16 MMOL/L — SIGNIFICANT CHANGE UP (ref 5–17)
BUN SERPL-MCNC: 18 MG/DL — SIGNIFICANT CHANGE UP (ref 7–23)
CALCIUM SERPL-MCNC: 8.8 MG/DL — SIGNIFICANT CHANGE UP (ref 8.4–10.5)
CHLORIDE SERPL-SCNC: 100 MMOL/L — SIGNIFICANT CHANGE UP (ref 96–108)
CO2 SERPL-SCNC: 21 MMOL/L — LOW (ref 22–31)
CREAT SERPL-MCNC: 1.01 MG/DL — SIGNIFICANT CHANGE UP (ref 0.5–1.3)
EGFR: 58 ML/MIN/1.73M2 — LOW
GLUCOSE BLDC GLUCOMTR-MCNC: 101 MG/DL — HIGH (ref 70–99)
GLUCOSE BLDC GLUCOMTR-MCNC: 133 MG/DL — HIGH (ref 70–99)
GLUCOSE BLDC GLUCOMTR-MCNC: 140 MG/DL — HIGH (ref 70–99)
GLUCOSE BLDC GLUCOMTR-MCNC: 147 MG/DL — HIGH (ref 70–99)
GLUCOSE SERPL-MCNC: 103 MG/DL — HIGH (ref 70–99)
HCT VFR BLD CALC: 35.3 % — SIGNIFICANT CHANGE UP (ref 34.5–45)
HGB BLD-MCNC: 11.3 G/DL — LOW (ref 11.5–15.5)
INR BLD: 1.23 RATIO — HIGH (ref 0.85–1.18)
MAGNESIUM SERPL-MCNC: 1.7 MG/DL — SIGNIFICANT CHANGE UP (ref 1.6–2.6)
MCHC RBC-ENTMCNC: 31.3 PG — SIGNIFICANT CHANGE UP (ref 27–34)
MCHC RBC-ENTMCNC: 32 GM/DL — SIGNIFICANT CHANGE UP (ref 32–36)
MCV RBC AUTO: 97.8 FL — SIGNIFICANT CHANGE UP (ref 80–100)
NRBC # BLD: 0 /100 WBCS — SIGNIFICANT CHANGE UP (ref 0–0)
PLATELET # BLD AUTO: 157 K/UL — SIGNIFICANT CHANGE UP (ref 150–400)
POTASSIUM SERPL-MCNC: 3.8 MMOL/L — SIGNIFICANT CHANGE UP (ref 3.5–5.3)
POTASSIUM SERPL-SCNC: 3.8 MMOL/L — SIGNIFICANT CHANGE UP (ref 3.5–5.3)
PROTHROM AB SERPL-ACNC: 12.8 SEC — SIGNIFICANT CHANGE UP (ref 9.5–13)
RBC # BLD: 3.61 M/UL — LOW (ref 3.8–5.2)
RBC # FLD: 13.5 % — SIGNIFICANT CHANGE UP (ref 10.3–14.5)
SODIUM SERPL-SCNC: 137 MMOL/L — SIGNIFICANT CHANGE UP (ref 135–145)
WBC # BLD: 7.7 K/UL — SIGNIFICANT CHANGE UP (ref 3.8–10.5)
WBC # FLD AUTO: 7.7 K/UL — SIGNIFICANT CHANGE UP (ref 3.8–10.5)

## 2023-09-12 PROCEDURE — 33264 RMVL & RPLCMT DFB GEN MLT LD: CPT

## 2023-09-12 PROCEDURE — 93010 ELECTROCARDIOGRAM REPORT: CPT

## 2023-09-12 RX ORDER — REPAGLINIDE 1 MG/1
0.5 TABLET ORAL
Refills: 0 | Status: DISCONTINUED | OUTPATIENT
Start: 2023-09-12 | End: 2023-09-13

## 2023-09-12 RX ORDER — MAGNESIUM SULFATE 500 MG/ML
2 VIAL (ML) INJECTION ONCE
Refills: 0 | Status: COMPLETED | OUTPATIENT
Start: 2023-09-12 | End: 2023-09-12

## 2023-09-12 RX ADMIN — Medication 3 MILLILITER(S): at 23:07

## 2023-09-12 RX ADMIN — INSULIN GLARGINE 10 UNIT(S): 100 INJECTION, SOLUTION SUBCUTANEOUS at 22:04

## 2023-09-12 RX ADMIN — Medication 25 GRAM(S): at 15:08

## 2023-09-12 RX ADMIN — Medication 600 MILLIGRAM(S): at 05:35

## 2023-09-12 RX ADMIN — Medication 3 MILLILITER(S): at 18:29

## 2023-09-12 RX ADMIN — SPIRONOLACTONE 25 MILLIGRAM(S): 25 TABLET, FILM COATED ORAL at 05:35

## 2023-09-12 RX ADMIN — Medication 3 MILLILITER(S): at 00:02

## 2023-09-12 RX ADMIN — Medication 40 MILLIGRAM(S): at 05:35

## 2023-09-12 RX ADMIN — PANTOPRAZOLE SODIUM 40 MILLIGRAM(S): 20 TABLET, DELAYED RELEASE ORAL at 05:35

## 2023-09-12 RX ADMIN — ATORVASTATIN CALCIUM 20 MILLIGRAM(S): 80 TABLET, FILM COATED ORAL at 21:47

## 2023-09-12 RX ADMIN — MONTELUKAST 10 MILLIGRAM(S): 4 TABLET, CHEWABLE ORAL at 18:28

## 2023-09-12 RX ADMIN — Medication 1 MILLIGRAM(S): at 18:29

## 2023-09-12 RX ADMIN — REPAGLINIDE 0.5 MILLIGRAM(S): 1 TABLET ORAL at 18:29

## 2023-09-12 RX ADMIN — Medication 600 MILLIGRAM(S): at 18:28

## 2023-09-12 RX ADMIN — Medication 3 MILLILITER(S): at 05:37

## 2023-09-12 NOTE — DIETITIAN INITIAL EVALUATION ADULT - PERSON TAUGHT/METHOD
Reinforced CHF education to daughter at bedside; daughter reports pt is extensively familiar with guidelines. Discussed low sodium alternatives for spices. Provided tools to help while grocery shopping on a heart healthy diet./verbal instruction/written material/daughter instructed

## 2023-09-12 NOTE — DIETITIAN INITIAL EVALUATION ADULT - REASON FOR ADMISSION
Dizziness and giddiness    Per chart, "78F PMH/PSH including NICM, CHF (EF 50-55 on note from 4/17/23), PPM, Aflutter on Eliquis, COPD, asthma (Ventolin, Breztri) complicated by respiratory failure (trach and PEG in past), GI bleed, perforated diverticulitis, s/p Ex Lap and Lupe's (7/2019), s/p L hemicolectomy and end colostomy, subcutaneous fistula, DM2, presents to the ER for dizziness. Reports dizziness is intermittent usually occur when standing, started few days ago. Pt w/PPM, c/f pacemaker battery. Few days ago attempted to sit on chair w/wheels (computer chair), chair moved out from under her, she fell hit her knee and side, "it all happened so fast" , don't think she hit her head but is not sure,  she feels fine now."

## 2023-09-12 NOTE — DIETITIAN INITIAL EVALUATION ADULT - NS FNS DIET ORDER
Diet, NPO after Midnight:      NPO Start Date: 11-Sep-2023,   NPO Start Time: 23:59 (09-11-23 @ 19:03)  Diet, NPO after Midnight:      NPO Start Date: 11-Sep-2023,   NPO Start Time: 23:59 (09-11-23 @ 15:32)

## 2023-09-12 NOTE — DIETITIAN INITIAL EVALUATION ADULT - OTHER CALCULATIONS
Used current dosing wt of 73 kg (9/10) for caloric and protein needs; defer fluids to team in setting of CHF.

## 2023-09-12 NOTE — DIETITIAN INITIAL EVALUATION ADULT - REASON INDICATOR FOR ASSESSMENT
RD Consult indicated for: Decompensated Heart Failure  Source: Electronic Medical Record, Daughter (at bedside); Unable to interview pt secondary to having just returned from procedure  Chart reviewed, events noted.

## 2023-09-12 NOTE — DIETITIAN INITIAL EVALUATION ADULT - LITERATURE/VIDEOS GIVEN
Provided heart healthy nutrition therapy, grocery shopping tips for heart healthy nutrition, sodium free flavorings, heart healthy label reading.

## 2023-09-12 NOTE — DIETITIAN INITIAL EVALUATION ADULT - ORAL INTAKE PTA/DIET HISTORY
Per daughter at bedside, pt PTA: Reports good appetite/PO intake; consuming meals 3x/day. Endorses pt follows a low salt/sugar free diet. Reports taking a multivitamin and vitamin C daily; reports no protein supplementation. No known food allergies/intolerances reported. No chewing/swallowing difficulties reported at this time. Noted pt wears dentures (upper/lower) for meal consumption.    Per daughter at bedside, pt PTA: Reports good appetite/PO intake; consuming meals 3x/day. Endorses pt follows a low salt/sugar free diet. Reports taking a multivitamin and vitamin C daily; reports no protein supplementation. No known food allergies/intolerances reported. Endorsed slight difficulty chewing tougher meat, but would not like a diet modification at this time. Noted pt wears dentures (upper/lower) for meal consumption.

## 2023-09-12 NOTE — DIETITIAN INITIAL EVALUATION ADULT - NSFNSGIIOFT_GEN_A_CORE
I&O's Detail    11 Sep 2023 07:01  -  12 Sep 2023 07:00  --------------------------------------------------------  IN:    Oral Fluid: 240 mL  Total IN: 240 mL    OUT:    Voided (mL): 400 mL  Total OUT: 400 mL    Total NET: -160 mL

## 2023-09-12 NOTE — DIETITIAN INITIAL EVALUATION ADULT - ADD RECOMMEND
1) Continue with DASH/TLC diet; Defer texture/consistency to Speech Language Pathologist prn.   2) Encourage small, frequent meals; obtain food preferences to optimize PO intake.   3) Consider adding multivitamin to meet 100% RDA, pending no medical contraindications.   4) Provide DM education as able.  5) RD will continue to monitor PO intake, GI tolerance, weight trends, skin integrity, BMs, labs/electrolytes prn.   RD remains available upon request.

## 2023-09-12 NOTE — DIETITIAN INITIAL EVALUATION ADULT - PROBLEM SELECTOR PLAN 5
- c/w Repaglinide ( home med)   - Toujeo ( home med) ->>Lantus 10u qHS while admitted    - ISS ACHS  - Check FS, adjust insulin accordingly   - DASH/CC diet  - Check A1c

## 2023-09-12 NOTE — DIETITIAN INITIAL EVALUATION ADULT - PERTINENT LABORATORY DATA
09-12    137  |  100  |  18  ----------------------------<  103<H>  3.8   |  21<L>  |  1.01    Ca    8.8      12 Sep 2023 10:21  Mg     1.7     09-12    TPro  8.0  /  Alb  3.8  /  TBili  0.5  /  DBili  x   /  AST  15  /  ALT  11  /  AlkPhos  101  09-10  POCT Blood Glucose.: 133 mg/dL (09-12-23 @ 13:48)  A1C with Estimated Average Glucose Result: 7.9 % (09-11-23 @ 11:01)

## 2023-09-12 NOTE — DIETITIAN INITIAL EVALUATION ADULT - PERTINENT MEDS FT
MEDICATIONS  (STANDING):  albuterol/ipratropium for Nebulization 3 milliLiter(s) Nebulizer every 6 hours  atorvastatin 20 milliGRAM(s) Oral at bedtime  budesonide  80 MICROgram(s)/formoterol 4.5 MICROgram(s) Inhaler 2 Puff(s) Inhalation two times a day  dextrose 5%. 1000 milliLiter(s) (100 mL/Hr) IV Continuous <Continuous>  dextrose 5%. 1000 milliLiter(s) (50 mL/Hr) IV Continuous <Continuous>  dextrose 50% Injectable 25 Gram(s) IV Push once  dextrose 50% Injectable 12.5 Gram(s) IV Push once  dextrose 50% Injectable 25 Gram(s) IV Push once  folic acid 1 milliGRAM(s) Oral daily  furosemide    Tablet 40 milliGRAM(s) Oral two times a day  glucagon  Injectable 1 milliGRAM(s) IntraMuscular once  guaiFENesin  milliGRAM(s) Oral every 12 hours  insulin glargine Injectable (LANTUS) 10 Unit(s) SubCutaneous at bedtime  insulin lispro (ADMELOG) corrective regimen sliding scale   SubCutaneous three times a day before meals  insulin lispro (ADMELOG) corrective regimen sliding scale   SubCutaneous at bedtime  magnesium sulfate  IVPB 2 Gram(s) IV Intermittent once  montelukast 10 milliGRAM(s) Oral daily  pantoprazole    Tablet 40 milliGRAM(s) Oral before breakfast  spironolactone 25 milliGRAM(s) Oral two times a day  tiotropium 2.5 MICROgram(s) Inhaler 2 Puff(s) Inhalation daily    MEDICATIONS  (PRN):  acetaminophen     Tablet .. 650 milliGRAM(s) Oral every 6 hours PRN Temp greater or equal to 38C (100.4F), Mild Pain (1 - 3)  albuterol    90 MICROgram(s) HFA Inhaler 1 Puff(s) Inhalation every 4 hours PRN Shortness of Breath and/or Wheezing  aluminum hydroxide/magnesium hydroxide/simethicone Suspension 30 milliLiter(s) Oral every 4 hours PRN Dyspepsia  dextrose Oral Gel 15 Gram(s) Oral once PRN Blood Glucose LESS THAN 70 milliGRAM(s)/deciliter  melatonin 3 milliGRAM(s) Oral at bedtime PRN Insomnia  ondansetron Injectable 4 milliGRAM(s) IV Push every 8 hours PRN Nausea and/or Vomiting

## 2023-09-12 NOTE — PROGRESS NOTE ADULT - ASSESSMENT
78F PMH/PSH including NICM, CHF (EF 50-55 on note from 4/17/23, stable from TTE 2021), s/up upgrade to CRT-D by Dr. 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 States she attempted to sit on chair with wheels when it moved out from under her, causing her to fall. Denies LOC or head trauma. She denies syncope, pre syncope. Endorses occasional episodes recently of being "dizzy in the heat." Denies dizziness w/ changes of position, chest pain or tightness, palpitations, SOB/SILVA. Device interrogation performed revealing it is at [facility].     1) NICM s/p CRT-D, now at [facility]  2) NSVT   3) pAFL    - NPO s/p mn for generator change 9/12   - Hold Eliquis 9/11PM and  9/12 AM prior to procedure. Do not use Heparin/Lovenox SQ pre/post procedure to prevent pocket hematoma. EP to guide resuming AC post procedure   - Maintain active T&S, two sets ordered   - Patient w/ NSVT noted on device interrogation, currently having episodes of NSVT on tele, though is asymptomatic. She has not been on BB 2/2 c/f bronchospasm. BiV pacing percentage 99%    - Monitor on telemetry  - Per patient and daughter, as well as outpatient records, no TTE since 2021. Would obtain TTE 78F PMH/PSH including NICM, CHF (EF 50-55 on note from 4/17/23, stable from TTE 2021), s/up upgrade to CRT-D by Dr. 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 States she attempted to sit on chair with wheels when it moved out from under her, causing her to fall. Denies LOC or head trauma. She denies syncope, pre syncope. Endorses occasional episodes recently of being "dizzy in the heat." Denies dizziness w/ changes of position, chest pain or tightness, palpitations, SOB/SILVA. Device interrogation performed revealing it is at [facility].     1) NICM s/p CRT-D, now at [facility]  2) NSVT   3) pAFL    - NPO s/p mn for generator change 9/12   - Hold Eliquis 9/11PM and  9/12 AM prior to procedure. Do not use Heparin/Lovenox SQ pre/post procedure to prevent pocket hematoma. resume Eliquis on 9/16 at 8am  - Maintain active T&S, two sets ordered   - Patient w/ NSVT noted on device interrogation, currently having episodes of NSVT on tele, though is asymptomatic. She has not been on BB 2/2 c/f bronchospasm. BiV pacing percentage 99%    - Monitor on telemetry  - Per patient and daughter, as well as outpatient records, no TTE since 2021. Would obtain TTE

## 2023-09-12 NOTE — DIETITIAN INITIAL EVALUATION ADULT - OTHER INFO
GI/Intake:  - Endorses good appetite/PO intake in-house. Obtained pt's food preferences, RD to honor as able. Ordered for folic acid.   - Per daughter, reports pt with no N/V; ordered for odansetron.   - Ordered for PPI, simethicone.     Endo:  - Hx of T2DM. Noted A1C of 7.9% (9/11); reflects poor glycemic control. Elevated POCTs x 24hrs: 112 - 194 mg/dL. Ordered for Lantus (10 units), ADMELOG SSI. RD will continue to monitor blood glucose levels prn.     Renal:  - Noted Na, K+, Mg WNL as of today (9/12); no phosphorus ordered. RD will continue to monitor electrolytes prn.  - Ordered for Lasix (last dose given on 9/12).     Wt Hx:  - Per daughter, reports pt with UBW of 71.4 kg; endorses weight is stable with slight fluctuations due to fluid shifts associated with CHF. Per chart, dosing wt of 73 kg (9/10). No wt hx available in [facility]. RD will continue to monitor weight trends as available/able.   - IBW: 50 kg (based on ht of 62 in)  GI/Intake:  - Endorses good appetite/PO intake in-house. Obtained pt's food preferences, RD to honor as able. Ordered for folic acid.   - Per daughter, reports pt with no N/V; ordered for odansetron.   - Ordered for PPI, simethicone.     Endo:  - Hx of T2DM. Noted A1C of 7.9% (9/11); reflects poor glycemic control. Elevated POCTs x 24hrs: 112 - 194 mg/dL. Ordered for Lantus (10 units), ADMELOG SSI. RD will continue to monitor blood glucose levels prn. DM education deferred at this time in setting of pt sleeping s/p procedure.     Renal:  - Noted Na, K+, Mg WNL as of today (9/12); no phosphorus ordered. RD will continue to monitor electrolytes prn.  - Ordered for Lasix (last dose given on 9/12).     Wt Hx:  - Per daughter, reports pt with UBW of 71.4 kg; endorses weight is stable with slight fluctuations due to fluid shifts associated with CHF. Per chart, dosing wt of 73 kg (9/10). No wt hx available in [facility]. RD will continue to monitor weight trends as available/able.   - IBW: 50 kg (based on ht of 62 in)  GI/Intake:  - Endorses good appetite/PO intake in-house. Obtained pt's food preferences, RD to honor as able. Ordered for folic acid.   - Per daughter, reports pt with no N/V; ordered for odansetron.   - Ordered for PPI, simethicone.     Endo:  - Hx of T2DM. Noted A1C of 7.9% (9/11); reflects suboptimal glycemic control. Elevated POCTs x 24hrs: 112 - 194 mg/dL. Ordered for Lantus (10 units), ADMELOG SSI. RD will continue to monitor blood glucose levels prn. DM education deferred at this time in setting of pt sleeping s/p procedure.     Renal:  - Noted Na, K+, Mg WNL as of today (9/12); no phosphorus ordered. RD will continue to monitor electrolytes prn.  - Ordered for Lasix (last dose given on 9/12).     Wt Hx:  - Per daughter, reports pt with UBW of 71.4 kg; endorses weight is stable with slight fluctuations due to fluid shifts associated with CHF. Per chart, dosing wt of 73 kg (9/10). No wt hx available in [facility]. RD will continue to monitor weight trends as available/able.   - IBW: 50 kg (based on ht of 62 in)

## 2023-09-12 NOTE — PROGRESS NOTE ADULT - NS_MD_PANP_GEN_ALL_CORE
Add 76499 Cpt? (Important Note: In 2017 The Use Of 26199 Is Being Tracked By Cms To Determine Future Global Period Reimbursement For Global Periods): no Detail Level: Detailed Attending and PA/NP shared services statement (NON-critical care):

## 2023-09-12 NOTE — DIETITIAN INITIAL EVALUATION ADULT - NSICDXPASTSURGICALHX_GEN_ALL_CORE_FT
PAST SURGICAL HISTORY:  AICD (Automatic Cardioverter/Defibrillator) Present inserted in Aug, 2008. Due for battery change in 1 month. ( [employer]) . Inserted by Dr Duffy    S/P cholecystectomy     Status post left hemicolectomy

## 2023-09-12 NOTE — DIETITIAN INITIAL EVALUATION ADULT - NSICDXPASTMEDICALHX_GEN_ALL_CORE_FT
PAST MEDICAL HISTORY:  Asthma     Atrial flutter     Cardiac Pacemaker     Cardiomyopathy     Congestive Heart Failure     Diabetes     Diverticulitis     Gout     HTN - Hypertension     Kidney stone     Patient is Sikhism     Refusal of blood transfusions as patient is Sikhism     Vertigo

## 2023-09-13 ENCOUNTER — TRANSCRIPTION ENCOUNTER (OUTPATIENT)
Age: 78
End: 2023-09-13

## 2023-09-13 VITALS
DIASTOLIC BLOOD PRESSURE: 82 MMHG | SYSTOLIC BLOOD PRESSURE: 137 MMHG | RESPIRATION RATE: 18 BRPM | HEART RATE: 104 BPM | OXYGEN SATURATION: 93 % | TEMPERATURE: 100 F

## 2023-09-13 LAB
A1C WITH ESTIMATED AVERAGE GLUCOSE RESULT: 7.7 % — HIGH (ref 4–5.6)
ANION GAP SERPL CALC-SCNC: 14 MMOL/L — SIGNIFICANT CHANGE UP (ref 5–17)
BUN SERPL-MCNC: 16 MG/DL — SIGNIFICANT CHANGE UP (ref 7–23)
CALCIUM SERPL-MCNC: 9.1 MG/DL — SIGNIFICANT CHANGE UP (ref 8.4–10.5)
CHLORIDE SERPL-SCNC: 101 MMOL/L — SIGNIFICANT CHANGE UP (ref 96–108)
CO2 SERPL-SCNC: 25 MMOL/L — SIGNIFICANT CHANGE UP (ref 22–31)
CREAT SERPL-MCNC: 1.08 MG/DL — SIGNIFICANT CHANGE UP (ref 0.5–1.3)
EGFR: 53 ML/MIN/1.73M2 — LOW
ESTIMATED AVERAGE GLUCOSE: 174 MG/DL — HIGH (ref 68–114)
GLUCOSE BLDC GLUCOMTR-MCNC: 102 MG/DL — HIGH (ref 70–99)
GLUCOSE BLDC GLUCOMTR-MCNC: 141 MG/DL — HIGH (ref 70–99)
GLUCOSE BLDC GLUCOMTR-MCNC: 177 MG/DL — HIGH (ref 70–99)
GLUCOSE SERPL-MCNC: 81 MG/DL — SIGNIFICANT CHANGE UP (ref 70–99)
HCT VFR BLD CALC: 33.3 % — LOW (ref 34.5–45)
HGB BLD-MCNC: 11.1 G/DL — LOW (ref 11.5–15.5)
MAGNESIUM SERPL-MCNC: 2 MG/DL — SIGNIFICANT CHANGE UP (ref 1.6–2.6)
MCHC RBC-ENTMCNC: 32.6 PG — SIGNIFICANT CHANGE UP (ref 27–34)
MCHC RBC-ENTMCNC: 33.3 GM/DL — SIGNIFICANT CHANGE UP (ref 32–36)
MCV RBC AUTO: 97.7 FL — SIGNIFICANT CHANGE UP (ref 80–100)
NRBC # BLD: 0 /100 WBCS — SIGNIFICANT CHANGE UP (ref 0–0)
PLATELET # BLD AUTO: 195 K/UL — SIGNIFICANT CHANGE UP (ref 150–400)
POTASSIUM SERPL-MCNC: 3.6 MMOL/L — SIGNIFICANT CHANGE UP (ref 3.5–5.3)
POTASSIUM SERPL-SCNC: 3.6 MMOL/L — SIGNIFICANT CHANGE UP (ref 3.5–5.3)
RBC # BLD: 3.41 M/UL — LOW (ref 3.8–5.2)
RBC # FLD: 13.7 % — SIGNIFICANT CHANGE UP (ref 10.3–14.5)
SODIUM SERPL-SCNC: 140 MMOL/L — SIGNIFICANT CHANGE UP (ref 135–145)
WBC # BLD: 9.03 K/UL — SIGNIFICANT CHANGE UP (ref 3.8–10.5)
WBC # FLD AUTO: 9.03 K/UL — SIGNIFICANT CHANGE UP (ref 3.8–10.5)

## 2023-09-13 PROCEDURE — 93306 TTE W/DOPPLER COMPLETE: CPT | Mod: 26

## 2023-09-13 RX ORDER — AMIODARONE HYDROCHLORIDE 400 MG/1
1 TABLET ORAL
Qty: 30 | Refills: 0
Start: 2023-09-13 | End: 2023-10-12

## 2023-09-13 RX ORDER — SODIUM CHLORIDE 0.65 %
1 AEROSOL, SPRAY (ML) NASAL ONCE
Refills: 0 | Status: COMPLETED | OUTPATIENT
Start: 2023-09-13 | End: 2023-09-13

## 2023-09-13 RX ADMIN — Medication 40 MILLIGRAM(S): at 14:07

## 2023-09-13 RX ADMIN — Medication 40 MILLIGRAM(S): at 05:15

## 2023-09-13 RX ADMIN — PANTOPRAZOLE SODIUM 40 MILLIGRAM(S): 20 TABLET, DELAYED RELEASE ORAL at 05:15

## 2023-09-13 RX ADMIN — Medication 1 MILLIGRAM(S): at 12:38

## 2023-09-13 RX ADMIN — SPIRONOLACTONE 25 MILLIGRAM(S): 25 TABLET, FILM COATED ORAL at 05:15

## 2023-09-13 RX ADMIN — Medication 3 MILLILITER(S): at 12:42

## 2023-09-13 RX ADMIN — REPAGLINIDE 0.5 MILLIGRAM(S): 1 TABLET ORAL at 12:39

## 2023-09-13 RX ADMIN — Medication 3 MILLILITER(S): at 05:15

## 2023-09-13 RX ADMIN — Medication 1 SPRAY(S): at 05:56

## 2023-09-13 RX ADMIN — Medication 1: at 12:38

## 2023-09-13 RX ADMIN — MONTELUKAST 10 MILLIGRAM(S): 4 TABLET, CHEWABLE ORAL at 12:39

## 2023-09-13 RX ADMIN — Medication 600 MILLIGRAM(S): at 05:15

## 2023-09-13 NOTE — DISCHARGE NOTE PROVIDER - NSDCMRMEDTOKEN_GEN_ALL_CORE_FT
apixaban 5 mg oral tablet: 1 tab(s) orally every 12 hours start on 9/16/2023  ATORVASTATIN 20 MG TABLET:   Breztri Aerosphere 160 mcg-9 mcg-4.8 mcg/inh inhalation aerosol: 2 puff(s) inhaled once a day  FOLIC ACID 1 MG TABLET:   furosemide 40 mg oral tablet: 1 tab(s) orally once a day  montelukast 10 mg oral tablet: 1 tab(s) orally once a day  omeprazole 40 mg oral delayed release capsule: 1 cap(s) orally once a day  repaglinide 0.5 mg oral tablet: 1 tab(s) orally 3 times a day (before meals)  spironolactone 25 mg oral tablet: 1 orally 3 times a day  Toujeo SoloStar 300 units/mL subcutaneous solution: 10 unit(s) subcutaneous once a day (at bedtime)  Ventolin HFA 90 mcg/inh inhalation aerosol: 1 puff(s) inhaled every 4 hours-6 hours as needed    amiodarone 200 mg oral tablet: 1 tab(s) orally once a day  apixaban 5 mg oral tablet: 1 tab(s) orally every 12 hours start on 9/16/2023  ATORVASTATIN 20 MG TABLET:   Breztri Aerosphere 160 mcg-9 mcg-4.8 mcg/inh inhalation aerosol: 2 puff(s) inhaled once a day  FOLIC ACID 1 MG TABLET:   furosemide 40 mg oral tablet: 1 tab(s) orally once a day  montelukast 10 mg oral tablet: 1 tab(s) orally once a day  omeprazole 40 mg oral delayed release capsule: 1 cap(s) orally once a day  repaglinide 0.5 mg oral tablet: 1 tab(s) orally 3 times a day (before meals)  spironolactone 25 mg oral tablet: 1 orally 3 times a day  Toujeo SoloStar 300 units/mL subcutaneous solution: 10 unit(s) subcutaneous once a day (at bedtime)  Ventolin HFA 90 mcg/inh inhalation aerosol: 1 puff(s) inhaled every 4 hours-6 hours as needed

## 2023-09-13 NOTE — PROGRESS NOTE ADULT - PROBLEM SELECTOR PLAN 2
- S/p PPM   - c/w Eliquis  -Telemetry
resume apixaban 9/16 per EP  heart rate controlled
- S/p PPM   - c/w Eliquis  -Telemetry

## 2023-09-13 NOTE — PROGRESS NOTE ADULT - PROBLEM SELECTOR PLAN 5
finger sticks anticoagulation  continue finger sticks with short acting insulin sliding scale  HbA1C 7.7   outpatient follow up in 3 months with PCP
- c/w Repaglinide ( home med)   - Toujeo ( home med) ->>Lantus 10u qHS while admitted    - ISS ACHS  - Check FS, adjust insulin accordingly   - DASH/CC diet  - Check A1c
finger sticks anticoagulation  continue finger sticks with short acting insulin sliding scale  HbA1C in AM

## 2023-09-13 NOTE — PROVIDER CONTACT NOTE (OTHER) - SITUATION
Pt had 6 beats WCT on tele
RN unable to give repaglinide due to emergency situation with another pt
ectopy on tele, 5 seconds of ST

## 2023-09-13 NOTE — PROVIDER CONTACT NOTE (OTHER) - BACKGROUND
Pt admitted for dizziness and s/p fall, pt has pacemaker with battery change 9/12.
Pt admitted dizziness
Pt admitted for dizziness and s/p fall, pt has pacemaker with battery change 9/12. Pt has been having ventricular tachycardia in ED on admission as well, EP following up with patient.

## 2023-09-13 NOTE — PROGRESS NOTE ADULT - SUBJECTIVE AND OBJECTIVE BOX
24H hour events: NPO for generator change today   AS BiV paced, 6 bts WCT overnight     MEDICATIONS:  furosemide    Tablet 40 milliGRAM(s) Oral two times a day  spironolactone 25 milliGRAM(s) Oral two times a day      albuterol    90 MICROgram(s) HFA Inhaler 1 Puff(s) Inhalation every 4 hours PRN  albuterol/ipratropium for Nebulization 3 milliLiter(s) Nebulizer every 6 hours  budesonide  80 MICROgram(s)/formoterol 4.5 MICROgram(s) Inhaler 2 Puff(s) Inhalation two times a day  guaiFENesin  milliGRAM(s) Oral every 12 hours  montelukast 10 milliGRAM(s) Oral daily  tiotropium 2.5 MICROgram(s) Inhaler 2 Puff(s) Inhalation daily    acetaminophen     Tablet .. 650 milliGRAM(s) Oral every 6 hours PRN  melatonin 3 milliGRAM(s) Oral at bedtime PRN  ondansetron Injectable 4 milliGRAM(s) IV Push every 8 hours PRN    aluminum hydroxide/magnesium hydroxide/simethicone Suspension 30 milliLiter(s) Oral every 4 hours PRN  pantoprazole    Tablet 40 milliGRAM(s) Oral before breakfast    atorvastatin 20 milliGRAM(s) Oral at bedtime  dextrose 50% Injectable 25 Gram(s) IV Push once  dextrose 50% Injectable 12.5 Gram(s) IV Push once  dextrose 50% Injectable 25 Gram(s) IV Push once  dextrose Oral Gel 15 Gram(s) Oral once PRN  glucagon  Injectable 1 milliGRAM(s) IntraMuscular once  insulin glargine Injectable (LANTUS) 10 Unit(s) SubCutaneous at bedtime  insulin lispro (ADMELOG) corrective regimen sliding scale   SubCutaneous at bedtime  insulin lispro (ADMELOG) corrective regimen sliding scale   SubCutaneous three times a day before meals  repaglinide 0.5 milliGRAM(s) Oral three times a day before meals    dextrose 5%. 1000 milliLiter(s) IV Continuous <Continuous>  dextrose 5%. 1000 milliLiter(s) IV Continuous <Continuous>  folic acid 1 milliGRAM(s) Oral daily      REVIEW OF SYSTEMS:  See HPI, otherwise ROS negative.    PHYSICAL EXAM:  T(C): 36.7 (09-12-23 @ 08:29), Max: 36.7 (09-11-23 @ 19:03)  HR: 85 (09-12-23 @ 08:29) (80 - 94)  BP: 113/64 (09-12-23 @ 08:29) (113/64 - 126/66)  RR: 18 (09-12-23 @ 08:29) (18 - 19)  SpO2: 95% (09-12-23 @ 08:29) (95% - 98%)  Wt(kg): --  I&O's Summary    11 Sep 2023 07:01  -  12 Sep 2023 07:00  --------------------------------------------------------  IN: 240 mL / OUT: 400 mL / NET: -160 mL        Appearance: Alert. NAD	  Cardiovascular: +S1S2 RRR no m/g/r  Respiratory: CTA B/L	  Psychiatry: A & O x 3, Mood & affect appropriate  Gastrointestinal:  Soft, NT. ND. +BS	  Skin: No rashes	masses or lesions   Well healed ICD site   Neurologic: Non-focal  Extremities: No edema BLE  Vascular: Peripheral pulses palpable 2+ bilaterally      LABS:	 	    CBC Full  -  ( 11 Sep 2023 11:01 )  WBC Count : 9.47 K/uL  Hemoglobin : 12.3 g/dL  Hematocrit : 37.5 %  Platelet Count - Automated : 203 K/uL  Mean Cell Volume : 97.4 fl  Mean Cell Hemoglobin : 31.9 pg  Mean Cell Hemoglobin Concentration : 32.8 gm/dL  Auto Neutrophil # : x  Auto Lymphocyte # : x  Auto Monocyte # : x  Auto Eosinophil # : x  Auto Basophil # : x  Auto Neutrophil % : x  Auto Lymphocyte % : x  Auto Monocyte % : x  Auto Eosinophil % : x  Auto Basophil % : x    09-11    137  |  97  |  20  ----------------------------<  177<H>  3.6   |  24  |  1.00  09-10    137  |  99  |  21  ----------------------------<  117<H>  3.9   |  23  |  1.05    Ca    9.4      11 Sep 2023 11:01  Ca    9.9      10 Sep 2023 14:41  Mg     1.7     09-10    TPro  8.0  /  Alb  3.8  /  TBili  0.5  /  DBili  x   /  AST  15  /  ALT  11  /  AlkPhos  101  09-10    
24H hour events: AS BiV paced w/ PVC's no events     MEDICATIONS:  apixaban 5 milliGRAM(s) Oral every 12 hours  furosemide    Tablet 40 milliGRAM(s) Oral two times a day  spironolactone 25 milliGRAM(s) Oral two times a day      albuterol    90 MICROgram(s) HFA Inhaler 1 Puff(s) Inhalation every 4 hours PRN  albuterol/ipratropium for Nebulization 3 milliLiter(s) Nebulizer every 6 hours  budesonide  80 MICROgram(s)/formoterol 4.5 MICROgram(s) Inhaler 2 Puff(s) Inhalation two times a day  guaiFENesin  milliGRAM(s) Oral every 12 hours  montelukast 10 milliGRAM(s) Oral daily  tiotropium 2.5 MICROgram(s) Inhaler 2 Puff(s) Inhalation daily    acetaminophen     Tablet .. 650 milliGRAM(s) Oral every 6 hours PRN  melatonin 3 milliGRAM(s) Oral at bedtime PRN  ondansetron Injectable 4 milliGRAM(s) IV Push every 8 hours PRN    aluminum hydroxide/magnesium hydroxide/simethicone Suspension 30 milliLiter(s) Oral every 4 hours PRN  pantoprazole    Tablet 40 milliGRAM(s) Oral before breakfast    atorvastatin 20 milliGRAM(s) Oral at bedtime  dextrose 50% Injectable 25 Gram(s) IV Push once  dextrose 50% Injectable 25 Gram(s) IV Push once  dextrose 50% Injectable 12.5 Gram(s) IV Push once  dextrose Oral Gel 15 Gram(s) Oral once PRN  glucagon  Injectable 1 milliGRAM(s) IntraMuscular once  insulin glargine Injectable (LANTUS) 10 Unit(s) SubCutaneous at bedtime  insulin lispro (ADMELOG) corrective regimen sliding scale   SubCutaneous three times a day before meals  insulin lispro (ADMELOG) corrective regimen sliding scale   SubCutaneous at bedtime  repaglinide 0.5 milliGRAM(s) Oral three times a day before meals    dextrose 5%. 1000 milliLiter(s) IV Continuous <Continuous>  dextrose 5%. 1000 milliLiter(s) IV Continuous <Continuous>  folic acid 1 milliGRAM(s) Oral daily      REVIEW OF SYSTEMS:  See HPI, otherwise ROS negative.    PHYSICAL EXAM:  T(C): 36.6 (09-11-23 @ 13:19), Max: 36.9 (09-10-23 @ 23:35)  HR: 86 (09-11-23 @ 13:19) (80 - 101)  BP: 125/61 (09-11-23 @ 13:19) (122/76 - 130/81)  RR: 18 (09-11-23 @ 13:19) (15 - 20)  SpO2: 95% (09-11-23 @ 13:19) (95% - 99%)  Wt(kg): --  I&O's Summary      Appearance: Alert. NAD	  Cardiovascular: +S1S2 RRR no m/g/r  Respiratory: CTA B/L	  Psychiatry: A & O x 3, Mood & affect appropriate  Gastrointestinal:  Soft, NT. ND. +BS	  Skin: L sided ICD site c/d/i well healed   + ostomy site c/d/i to drainage bag   + abdominal wall fistula site w/ scant drainage, not erythematous or infected appearing. w/ overlying dressing that is c/d/i  Neurologic: Non-focal  Extremities: No edema BLE  Vascular: Peripheral pulses palpable 2+ bilaterally      LABS:	 	    CBC Full  -  ( 11 Sep 2023 11:01 )  WBC Count : 9.47 K/uL  Hemoglobin : 12.3 g/dL  Hematocrit : 37.5 %  Platelet Count - Automated : 203 K/uL  Mean Cell Volume : 97.4 fl  Mean Cell Hemoglobin : 31.9 pg  Mean Cell Hemoglobin Concentration : 32.8 gm/dL  Auto Neutrophil # : x  Auto Lymphocyte # : x  Auto Monocyte # : x  Auto Eosinophil # : x  Auto Basophil # : x  Auto Neutrophil % : x  Auto Lymphocyte % : x  Auto Monocyte % : x  Auto Eosinophil % : x  Auto Basophil % : x    09-11    137  |  97  |  20  ----------------------------<  177<H>  3.6   |  24  |  1.00  09-10    137  |  99  |  21  ----------------------------<  117<H>  3.9   |  23  |  1.05    Ca    9.4      11 Sep 2023 11:01  Ca    9.9      10 Sep 2023 14:41  Mg     1.7     09-10    TPro  8.0  /  Alb  3.8  /  TBili  0.5  /  DBili  x   /  AST  15  /  ALT  11  /  AlkPhos  101  09-10        TELEMETRY: AS BiV paced      < from: TTE with Doppler (w/Cont) (Transthoracic Echocardiogram) (06.21.21 @ 10:10) >  ------------------------------------------------------------------------  Observations:  Mitral Valve: Normal mitral valve. Minimal mitral  regurgitation.  Aortic Valve/Aorta: Aortic valve not well visualized. Peak  transaortic valve gradient equals 12 mm Hg, mean  transaortic valve gradient equals 7 mm Hg, aortic valve  velocity time integral equals 31 cm. No aortic valve  regurgitation seen. Peak left ventricular outflow tract  gradient equals 3 mm Hg, mean gradient is equal to 1 mmHg,  LVOT velocity time integral equals 16 cm.  Aortic Root: 3 cm.  LVOT diameter: 1.8 cm.  Left Atrium: Normal left atrium.  LA volume index = 24  cc/m2.  Left Ventricle: Endocardial visualization enhanced with  intravenous injection of Ultrasonic Enhancing Agent  (Definity). Overall preserved left ventricular systolic  function with mild segmental wall motion abnormalities. EF  50-55% by visuali estimation. 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 No  left ventricular thrombus. Grossly normal left ventricular  internal dimensions and wall thicknesses. Indeterminate  diastolic function.  Right Heart: Normal right atrium. Normal right ventricular  size and function. Device wires are noted in the right  heart. Normal tricuspid valve. Mild tricuspid  regurgitation.  Pericardium/Pleura: Normal pericardium with no pericardial  effusion.  Hemodynamic: Inadequate tricuspid regurgitation Doppler  envelope precludes estimation of RVSP.  ------------------------------------------------------------------------  Conclusions:  1. Normal mitral valve. Minimal mitral regurgitation.  2. Aortic valve not well visualized. No aortic valve  regurgitation seen.  3. Endocardial visualization enhanced with intravenous  injection of Ultrasonic Enhancing Agent (Definity). Overall  preserved left ventricular systolic function with mild  segmental wall motion abnormalities. EF 50-55% by visuali  estimation. The apical septum and the apical inferior  segments are aneurysmal and dyskinetic. No left ventricular  thrombus.  4. Normal right ventricular size and function. Device wires  are noted in the right heart.    < end of copied text >    < from: Cardiac Cath Lab - Adult (03.08.18 @ 12:45) >  CORONARY VESSELS: The coronary circulation is right dominant.  LM:   --  LM: Normal.  LAD:   --  LAD: Normal.  CX:   --  Circumflex: Normal.  RCA:   --  RCA: Normal.    < end of copied text >    
24H hour events: POD 1 generator change   AS BiV paced on telemetry, w/ episodes of -150's this AM, asymptomatic/HD stable    MEDICATIONS:  furosemide    Tablet 40 milliGRAM(s) Oral two times a day  spironolactone 25 milliGRAM(s) Oral two times a day      albuterol    90 MICROgram(s) HFA Inhaler 1 Puff(s) Inhalation every 4 hours PRN  albuterol/ipratropium for Nebulization 3 milliLiter(s) Nebulizer every 6 hours  guaiFENesin  milliGRAM(s) Oral every 12 hours  montelukast 10 milliGRAM(s) Oral daily    acetaminophen     Tablet .. 650 milliGRAM(s) Oral every 6 hours PRN  melatonin 3 milliGRAM(s) Oral at bedtime PRN  ondansetron Injectable 4 milliGRAM(s) IV Push every 8 hours PRN    aluminum hydroxide/magnesium hydroxide/simethicone Suspension 30 milliLiter(s) Oral every 4 hours PRN  pantoprazole    Tablet 40 milliGRAM(s) Oral before breakfast    atorvastatin 20 milliGRAM(s) Oral at bedtime  dextrose 50% Injectable 25 Gram(s) IV Push once  dextrose 50% Injectable 12.5 Gram(s) IV Push once  dextrose 50% Injectable 25 Gram(s) IV Push once  dextrose Oral Gel 15 Gram(s) Oral once PRN  glucagon  Injectable 1 milliGRAM(s) IntraMuscular once  insulin glargine Injectable (LANTUS) 10 Unit(s) SubCutaneous at bedtime  insulin lispro (ADMELOG) corrective regimen sliding scale   SubCutaneous at bedtime  insulin lispro (ADMELOG) corrective regimen sliding scale   SubCutaneous three times a day before meals  repaglinide 0.5 milliGRAM(s) Oral three times a day before meals    dextrose 5%. 1000 milliLiter(s) IV Continuous <Continuous>  dextrose 5%. 1000 milliLiter(s) IV Continuous <Continuous>  folic acid 1 milliGRAM(s) Oral daily      REVIEW OF SYSTEMS:  See HPI, otherwise ROS negative.    PHYSICAL EXAM:  T(C): 36.6 (09-13-23 @ 04:43), Max: 36.9 (09-12-23 @ 20:44)  HR: 84 (09-13-23 @ 05:01) (57 - 90)  BP: 116/67 (09-13-23 @ 04:43) (99/68 - 131/67)  RR: 18 (09-13-23 @ 04:43) (15 - 18)  SpO2: 98% (09-13-23 @ 04:43) (94% - 99%)  Wt(kg): --  I&O's Summary    12 Sep 2023 07:01  -  13 Sep 2023 07:00  --------------------------------------------------------  IN: 480 mL / OUT: 250 mL / NET: 230 mL        Appearance: Alert. NAD	  Cardiovascular: +S1S2 RRR no m/g/r  Respiratory: CTA B/L	  Psychiatry: A & O x 3, Mood & affect appropriate  Gastrointestinal:  Soft, NT. ND. +BS	  Skin: No rashes, masses or lesions   L infraclavicular CRT-D site c/d/i no hematoma + overlying steri with dried, old blood. No active oozing   Neurologic: Non-focal  Extremities: No edema BLE  Vascular: Peripheral pulses palpable 2+ bilaterally      LABS:	 	    CBC Full  -  ( 13 Sep 2023 07:03 )  WBC Count : 9.03 K/uL  Hemoglobin : 11.1 g/dL  Hematocrit : 33.3 %  Platelet Count - Automated : 195 K/uL  Mean Cell Volume : 97.7 fl  Mean Cell Hemoglobin : 32.6 pg  Mean Cell Hemoglobin Concentration : 33.3 gm/dL  Auto Neutrophil # : x  Auto Lymphocyte # : x  Auto Monocyte # : x  Auto Eosinophil # : x  Auto Basophil # : x  Auto Neutrophil % : x  Auto Lymphocyte % : x  Auto Monocyte % : x  Auto Eosinophil % : x  Auto Basophil % : x    09-13    140  |  101  |  16  ----------------------------<  81  3.6   |  25  |  1.08  09-12    137  |  100  |  18  ----------------------------<  103<H>  3.8   |  21<L>  |  1.01    Ca    9.1      13 Sep 2023 07:04  Ca    8.8      12 Sep 2023 10:21  Mg     2.0     09-13  Mg     1.7     09-12      TELEMETRY: AS BiV paced w/ episodes of PAT this AM     
Patient is a 78y old  Female who presents with a chief complaint of Dizziness and giddiness    Per chart, "78F PMH/PSH including NICM, CHF (EF 50-55 on note from 4/17/23), PPM, Aflutter on Eliquis, COPD, asthma (Ventolin, Breztri) complicated by respiratory failure (trach and PEG in past), GI bleed, perforated diverticulitis, s/p Ex Lap and Lupe's (7/2019), s/p L hemicolectomy and end colostomy, subcutaneous fistula, DM2, presents to the ER for dizziness. Reports dizziness is intermittent usually occur when standing, started few days ago. Pt w/PPM, c/f pacemaker battery. Few days ago attempted to sit on chair w/wheels (computer chair), chair moved out from under her, she fell hit her knee and side, "it all happened so fast" , don't think she hit her head but is not sure,  she feels fine now." (12 Sep 2023 14:23)      DATE OF SERVICE: 09-12-23 @ 17:04    SUBJECTIVE / OVERNIGHT EVENTS: overnight events noted    ROS:  Resp: No cough no sputum production  CVS: No chest pain no palpitations no orthopnea  GI: no N/V/D  : no dysuria, no hematuria          MEDICATIONS  (STANDING):  albuterol/ipratropium for Nebulization 3 milliLiter(s) Nebulizer every 6 hours  atorvastatin 20 milliGRAM(s) Oral at bedtime  budesonide  80 MICROgram(s)/formoterol 4.5 MICROgram(s) Inhaler 2 Puff(s) Inhalation two times a day  dextrose 5%. 1000 milliLiter(s) (100 mL/Hr) IV Continuous <Continuous>  dextrose 5%. 1000 milliLiter(s) (50 mL/Hr) IV Continuous <Continuous>  dextrose 50% Injectable 25 Gram(s) IV Push once  dextrose 50% Injectable 12.5 Gram(s) IV Push once  dextrose 50% Injectable 25 Gram(s) IV Push once  folic acid 1 milliGRAM(s) Oral daily  furosemide    Tablet 40 milliGRAM(s) Oral two times a day  glucagon  Injectable 1 milliGRAM(s) IntraMuscular once  guaiFENesin  milliGRAM(s) Oral every 12 hours  insulin glargine Injectable (LANTUS) 10 Unit(s) SubCutaneous at bedtime  insulin lispro (ADMELOG) corrective regimen sliding scale   SubCutaneous three times a day before meals  insulin lispro (ADMELOG) corrective regimen sliding scale   SubCutaneous at bedtime  montelukast 10 milliGRAM(s) Oral daily  pantoprazole    Tablet 40 milliGRAM(s) Oral before breakfast  spironolactone 25 milliGRAM(s) Oral two times a day  tiotropium 2.5 MICROgram(s) Inhaler 2 Puff(s) Inhalation daily    MEDICATIONS  (PRN):  acetaminophen     Tablet .. 650 milliGRAM(s) Oral every 6 hours PRN Temp greater or equal to 38C (100.4F), Mild Pain (1 - 3)  albuterol    90 MICROgram(s) HFA Inhaler 1 Puff(s) Inhalation every 4 hours PRN Shortness of Breath and/or Wheezing  aluminum hydroxide/magnesium hydroxide/simethicone Suspension 30 milliLiter(s) Oral every 4 hours PRN Dyspepsia  dextrose Oral Gel 15 Gram(s) Oral once PRN Blood Glucose LESS THAN 70 milliGRAM(s)/deciliter  melatonin 3 milliGRAM(s) Oral at bedtime PRN Insomnia  ondansetron Injectable 4 milliGRAM(s) IV Push every 8 hours PRN Nausea and/or Vomiting        CAPILLARY BLOOD GLUCOSE      POCT Blood Glucose.: 101 mg/dL (12 Sep 2023 16:45)  POCT Blood Glucose.: 133 mg/dL (12 Sep 2023 13:48)  POCT Blood Glucose.: 147 mg/dL (12 Sep 2023 07:59)  POCT Blood Glucose.: 194 mg/dL (11 Sep 2023 21:49)  POCT Blood Glucose.: 112 mg/dL (11 Sep 2023 17:36)    I&O's Summary    11 Sep 2023 07:01  -  12 Sep 2023 07:00  --------------------------------------------------------  IN: 240 mL / OUT: 400 mL / NET: -160 mL        Vital Signs Last 24 Hrs  T(C): 36.6 (12 Sep 2023 13:55), Max: 36.7 (11 Sep 2023 19:03)  T(F): 97.9 (12 Sep 2023 13:55), Max: 98.1 (11 Sep 2023 19:03)  HR: 87 (12 Sep 2023 13:55) (70 - 94)  BP: 111/66 (12 Sep 2023 13:55) (111/66 - 131/67)  BP(mean): --  RR: 17 (12 Sep 2023 13:55) (15 - 19)  SpO2: 97% (12 Sep 2023 13:55) (95% - 98%)    PHYSICAL EXAM:  GENERAL: in no apparent distress  HEAD:  Atraumatic, Normocephalic  EYES: EOMI, PERRLA, sclera clear  NECK: Supple, No JVD  CHEST/LUNG: clear b/l, no wheeze  HEART: S1 S2; no murmurs appreciated  ABDOMEN: Soft, Nontender, Bowel sounds present  EXTREMITIES:  No clubbing or cyanosis,  no edema  NEUROLOGY: AO x 3 non-focal  SKIN: No rashes or lesions    LABS:                        11.3   7.70  )-----------( 157      ( 12 Sep 2023 10:21 )             35.3     09-12    137  |  100  |  18  ----------------------------<  103<H>  3.8   |  21<L>  |  1.01    Ca    8.8      12 Sep 2023 10:21  Mg     1.7     09-12      PT/INR - ( 12 Sep 2023 09:55 )   PT: 12.8 sec;   INR: 1.23 ratio               Urinalysis Basic - ( 12 Sep 2023 10:21 )    Color: x / Appearance: x / SG: x / pH: x  Gluc: 103 mg/dL / Ketone: x  / Bili: x / Urobili: x   Blood: x / Protein: x / Nitrite: x   Leuk Esterase: x / RBC: x / WBC x   Sq Epi: x / Non Sq Epi: x / Bacteria: x          All consultant(s) notes reviewed and care discussed with other providers        Contact Number, Dr Nicole [phone]
Patient is a 78y old  Female who presents with a chief complaint of dizziness (10 Sep 2023 23:56)  patient not known to me, assigned this AM to assume care  chart reviewed and events thus far noted  admitted overnight by hospitalist colleague     DATE OF SERVICE: 09-11-23 @ 14:36    SUBJECTIVE / OVERNIGHT EVENTS: overnight events noted    ROS:  Resp: No cough no sputum production  CVS: No chest pain no palpitations no orthopnea  GI: no N/V/D  : no dysuria, no hematuria  "I feel better"         MEDICATIONS  (STANDING):  albuterol/ipratropium for Nebulization 3 milliLiter(s) Nebulizer every 6 hours  apixaban 5 milliGRAM(s) Oral every 12 hours  atorvastatin 20 milliGRAM(s) Oral at bedtime  budesonide  80 MICROgram(s)/formoterol 4.5 MICROgram(s) Inhaler 2 Puff(s) Inhalation two times a day  dextrose 5%. 1000 milliLiter(s) (50 mL/Hr) IV Continuous <Continuous>  dextrose 5%. 1000 milliLiter(s) (100 mL/Hr) IV Continuous <Continuous>  dextrose 50% Injectable 12.5 Gram(s) IV Push once  dextrose 50% Injectable 25 Gram(s) IV Push once  dextrose 50% Injectable 25 Gram(s) IV Push once  folic acid 1 milliGRAM(s) Oral daily  furosemide    Tablet 40 milliGRAM(s) Oral two times a day  glucagon  Injectable 1 milliGRAM(s) IntraMuscular once  guaiFENesin  milliGRAM(s) Oral every 12 hours  insulin glargine Injectable (LANTUS) 10 Unit(s) SubCutaneous at bedtime  insulin lispro (ADMELOG) corrective regimen sliding scale   SubCutaneous three times a day before meals  insulin lispro (ADMELOG) corrective regimen sliding scale   SubCutaneous at bedtime  montelukast 10 milliGRAM(s) Oral daily  pantoprazole    Tablet 40 milliGRAM(s) Oral before breakfast  repaglinide 0.5 milliGRAM(s) Oral three times a day before meals  spironolactone 25 milliGRAM(s) Oral two times a day  tiotropium 2.5 MICROgram(s) Inhaler 2 Puff(s) Inhalation daily    MEDICATIONS  (PRN):  acetaminophen     Tablet .. 650 milliGRAM(s) Oral every 6 hours PRN Temp greater or equal to 38C (100.4F), Mild Pain (1 - 3)  albuterol    90 MICROgram(s) HFA Inhaler 1 Puff(s) Inhalation every 4 hours PRN Shortness of Breath and/or Wheezing  aluminum hydroxide/magnesium hydroxide/simethicone Suspension 30 milliLiter(s) Oral every 4 hours PRN Dyspepsia  dextrose Oral Gel 15 Gram(s) Oral once PRN Blood Glucose LESS THAN 70 milliGRAM(s)/deciliter  melatonin 3 milliGRAM(s) Oral at bedtime PRN Insomnia  ondansetron Injectable 4 milliGRAM(s) IV Push every 8 hours PRN Nausea and/or Vomiting        CAPILLARY BLOOD GLUCOSE      POCT Blood Glucose.: 177 mg/dL (11 Sep 2023 11:21)  POCT Blood Glucose.: 81 mg/dL (11 Sep 2023 07:41)  POCT Blood Glucose.: 183 mg/dL (10 Sep 2023 23:58)    I&O's Summary      Vital Signs Last 24 Hrs  T(C): 36.6 (11 Sep 2023 13:19), Max: 36.9 (10 Sep 2023 23:35)  T(F): 97.9 (11 Sep 2023 13:19), Max: 98.4 (10 Sep 2023 23:35)  HR: 86 (11 Sep 2023 13:19) (80 - 101)  BP: 125/61 (11 Sep 2023 13:19) (122/76 - 130/81)  BP(mean): --  RR: 18 (11 Sep 2023 13:19) (18 - 20)  SpO2: 95% (11 Sep 2023 13:19) (95% - 97%)    PHYSICAL EXAM:  EYES: EOMI, PERRLA,   NECK: Supple, No JVD  CHEST/LUNG: clear  HEART: S1 S2; no murmurs   ABDOMEN: Soft, Nontender  EXTREMITIES:  no edema  NEUROLOGY: AO x 3 non-focal  SKIN: No rashes or lesions    LABS:                        12.3   9.47  )-----------( 203      ( 11 Sep 2023 11:01 )             37.5     09-11    137  |  97  |  20  ----------------------------<  177<H>  3.6   |  24  |  1.00    Ca    9.4      11 Sep 2023 11:01  Mg     1.7     09-10    TPro  8.0  /  Alb  3.8  /  TBili  0.5  /  DBili  x   /  AST  15  /  ALT  11  /  AlkPhos  101  09-10    PT/INR - ( 11 Sep 2023 11:01 )   PT: 16.1 sec;   INR: 1.48 ratio         PTT - ( 10 Sep 2023 14:41 )  PTT:32.7 sec      Urinalysis Basic - ( 11 Sep 2023 11:01 )    Color: x / Appearance: x / SG: x / pH: x  Gluc: 177 mg/dL / Ketone: x  / Bili: x / Urobili: x   Blood: x / Protein: x / Nitrite: x   Leuk Esterase: x / RBC: x / WBC x   Sq Epi: x / Non Sq Epi: x / Bacteria: x          All consultant(s) notes reviewed and care discussed with other providers        Contact Number, Dr Nicole [phone]
Patient is a 78y old  Female who presents with a chief complaint of dizziness (13 Sep 2023 10:12)      DATE OF SERVICE: 09-13-23 @ 14:23    SUBJECTIVE / OVERNIGHT EVENTS: overnight events noted    ROS:  Resp: No cough no sputum production  CVS: No chest pain no palpitations no orthopnea  GI: no N/V/D  "I want to go home"         MEDICATIONS  (STANDING):  albuterol/ipratropium for Nebulization 3 milliLiter(s) Nebulizer every 6 hours  atorvastatin 20 milliGRAM(s) Oral at bedtime  dextrose 5%. 1000 milliLiter(s) (50 mL/Hr) IV Continuous <Continuous>  dextrose 5%. 1000 milliLiter(s) (100 mL/Hr) IV Continuous <Continuous>  dextrose 50% Injectable 12.5 Gram(s) IV Push once  dextrose 50% Injectable 25 Gram(s) IV Push once  dextrose 50% Injectable 25 Gram(s) IV Push once  folic acid 1 milliGRAM(s) Oral daily  furosemide    Tablet 40 milliGRAM(s) Oral two times a day  glucagon  Injectable 1 milliGRAM(s) IntraMuscular once  guaiFENesin  milliGRAM(s) Oral every 12 hours  insulin glargine Injectable (LANTUS) 10 Unit(s) SubCutaneous at bedtime  insulin lispro (ADMELOG) corrective regimen sliding scale   SubCutaneous at bedtime  insulin lispro (ADMELOG) corrective regimen sliding scale   SubCutaneous three times a day before meals  montelukast 10 milliGRAM(s) Oral daily  pantoprazole    Tablet 40 milliGRAM(s) Oral before breakfast  repaglinide 0.5 milliGRAM(s) Oral three times a day before meals  spironolactone 25 milliGRAM(s) Oral two times a day    MEDICATIONS  (PRN):  acetaminophen     Tablet .. 650 milliGRAM(s) Oral every 6 hours PRN Temp greater or equal to 38C (100.4F), Mild Pain (1 - 3)  albuterol    90 MICROgram(s) HFA Inhaler 1 Puff(s) Inhalation every 4 hours PRN Shortness of Breath and/or Wheezing  aluminum hydroxide/magnesium hydroxide/simethicone Suspension 30 milliLiter(s) Oral every 4 hours PRN Dyspepsia  dextrose Oral Gel 15 Gram(s) Oral once PRN Blood Glucose LESS THAN 70 milliGRAM(s)/deciliter  melatonin 3 milliGRAM(s) Oral at bedtime PRN Insomnia  ondansetron Injectable 4 milliGRAM(s) IV Push every 8 hours PRN Nausea and/or Vomiting        CAPILLARY BLOOD GLUCOSE      POCT Blood Glucose.: 177 mg/dL (13 Sep 2023 12:23)  POCT Blood Glucose.: 102 mg/dL (13 Sep 2023 07:48)  POCT Blood Glucose.: 140 mg/dL (12 Sep 2023 21:40)  POCT Blood Glucose.: 101 mg/dL (12 Sep 2023 16:45)    I&O's Summary    12 Sep 2023 07:01  -  13 Sep 2023 07:00  --------------------------------------------------------  IN: 480 mL / OUT: 250 mL / NET: 230 mL        Vital Signs Last 24 Hrs  T(C): 36.6 (13 Sep 2023 04:43), Max: 36.9 (12 Sep 2023 20:44)  T(F): 97.8 (13 Sep 2023 04:43), Max: 98.5 (12 Sep 2023 20:44)  HR: 84 (13 Sep 2023 05:01) (57 - 90)  BP: 116/67 (13 Sep 2023 04:43) (99/68 - 118/68)  BP(mean): --  RR: 18 (13 Sep 2023 04:43) (18 - 18)  SpO2: 98% (13 Sep 2023 04:43) (94% - 99%)    PHYSICAL EXAM:  EYES: EOMI, PERRLA,   NECK: Supple, No JVD  CHEST/LUNG: clear  AICD site clean  HEART: S1 S2; no murmurs   ABDOMEN: Soft, Nontender  EXTREMITIES:  no edema  NEUROLOGY: AO x 3 non-focal  SKIN: No rashes or lesions    LABS:                        11.1   9.03  )-----------( 195      ( 13 Sep 2023 07:03 )             33.3     09-13    140  |  101  |  16  ----------------------------<  81  3.6   |  25  |  1.08    Ca    9.1      13 Sep 2023 07:04  Mg     2.0     09-13      PT/INR - ( 12 Sep 2023 09:55 )   PT: 12.8 sec;   INR: 1.23 ratio               Urinalysis Basic - ( 13 Sep 2023 07:04 )    Color: x / Appearance: x / SG: x / pH: x  Gluc: 81 mg/dL / Ketone: x  / Bili: x / Urobili: x   Blood: x / Protein: x / Nitrite: x   Leuk Esterase: x / RBC: x / WBC x   Sq Epi: x / Non Sq Epi: x / Bacteria: x          All consultant(s) notes reviewed and care discussed with other providers        Contact Number, Dr Nicole [phone]

## 2023-09-13 NOTE — DISCHARGE NOTE NURSING/CASE MANAGEMENT/SOCIAL WORK - NSDCPEFALRISK_GEN_ALL_CORE
For information on Fall & Injury Prevention, visit: [email or web address] OR  [email or web address] OR  https://www.cdc.gov/steadi/patient.html

## 2023-09-13 NOTE — PROGRESS NOTE ADULT - PROBLEM SELECTOR PLAN 4
- C/w Montelukast (home med)  no wheeze audible
- C/w Montelukast (home med)  no wheeze audible
- C/w Montelukast (home med)  - Home med Breztri (non-formulary) -->> Symbicort & Spiriva while admitted   - Albuterol prn   - Duo neb

## 2023-09-13 NOTE — DISCHARGE NOTE PROVIDER - NSDCFUSCHEDAPPT_GEN_ALL_CORE_FT
[facility]  ELECTROPH [address]  Scheduled Appointment: 09/18/2023    [facility]  ELECTROPH [address]  Scheduled Appointment: 10/04/2023    [facility]  ELECTROPH [address]  Scheduled Appointment: 11/17/2023

## 2023-09-13 NOTE — PROVIDER CONTACT NOTE (OTHER) - REASON
ectopy on tele
Pt had 6 beats WCT on tele
RN unable to give repaglinide due to emergency situation with another pt

## 2023-09-13 NOTE — PROGRESS NOTE ADULT - ASSESSMENT
78F PMH/PSH including NICM, CHF (EF 50-55 on note from 4/17/23, stable from TTE 2021), s/up upgrade to CRT-D by Dr. 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 States she attempted to sit on chair with wheels when it moved out from under her, causing her to fall. Denies LOC or head trauma. She denies syncope, pre syncope. Endorses occasional episodes recently of being "dizzy in the heat." Denies dizziness w/ changes of position, chest pain or tightness, palpitations, SOB/SILVA. Device interrogation performed revealing it is at [facility].     1) NICM s/p CRT-D, now at [facility]  2) NSVT  3) history of pAFL  4) PAT     -  s/p generator change (MDT) 9/12. Site is  c/d/i w/o hematoma, nontender. Reviewed wound care instructions w/ patient. Provided appointment card for wound check 10/4 11:40AM and ID card/remote monitor   - Do not use Heparin/Lovenox SQ pre/post procedure to prevent pocket hematoma. Resume Eliquis on 9/16 at 8am  - Patient w/ NSVT noted on device interrogation, currently having episodes of NSVT on tele, though is asymptomatic. She has not been on BB 2/2 c/f bronchospasm. BiV pacing percentage 99%   - This AM w/ episodes of PAT, asymptomatic, will d/w attending re: CCB  - Monitor on telemetry  - Per patient and daughter, as well as outpatient records, no TTE since 2021. For TTE today   78F PMH/PSH including NICM, CHF (EF 50-55 on note from 4/17/23, stable from TTE 2021), s/up upgrade to CRT-D by Dr. 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 States she attempted to sit on chair with wheels when it moved out from under her, causing her to fall. Denies LOC or head trauma. She denies syncope, pre syncope. Endorses occasional episodes recently of being "dizzy in the heat." Denies dizziness w/ changes of position, chest pain or tightness, palpitations, SOB/SILVA. Device interrogation performed revealing it is at [facility].     1) NICM s/p CRT-D, now at [facility]  2) NSVT  3) history of pAFL  4) PAT     -  s/p generator change (MDT) 9/12. Site is  c/d/i w/o hematoma, nontender. Reviewed wound care instructions w/ patient. Provided appointment card for wound check 10/4 11:40AM and ID card/remote monitor   - Do not use Heparin/Lovenox SQ pre/post procedure to prevent pocket hematoma. Resume Eliquis on 9/16 at 8am  - Patient w/ NSVT noted on device interrogation, currently having episodes of NSVT on tele, though is asymptomatic. She has not been on BB 2/2 c/f bronchospasm. BiV pacing percentage 99%   - This AM w/ episodes of PAT. Will start Amio 200mg qd for NSVT/PAT   - Monitor on telemetry  - Per patient and daughter, as well as outpatient records, no TTE since 2021. For TTE today   78F PMH/PSH including NICM, CHF (EF 50-55 on note from 4/17/23, stable from TTE 2021), s/up upgrade to CRT-D by Dr. 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 States she attempted to sit on chair with wheels when it moved out from under her, causing her to fall. Denies LOC or head trauma. She denies syncope, pre syncope. Endorses occasional episodes recently of being "dizzy in the heat." Denies dizziness w/ changes of position, chest pain or tightness, palpitations, SOB/SILVA. Device interrogation performed revealing it is at [facility].     1) NICM s/p CRT-D, now at [facility]  2) NSVT  3) history of pAFL  4) PAT     -  s/p generator change (MDT) 9/12. Site is  c/d/i w/o hematoma, nontender. Reviewed wound care instructions w/ patient. Provided appointment card for wound check 10/4 11:40AM and ID card/remote monitor   - Do not use Heparin/Lovenox SQ pre/post procedure to prevent pocket hematoma. Resume Eliquis on 9/16 at 8am  - Patient w/ NSVT noted on device interrogation, currently having episodes of NSVT on tele, though is asymptomatic. She has not been on BB 2/2 c/f bronchospasm. BiV pacing percentage 99%   - This AM w/ episodes of PAT. Will start Amio 200mg qd for NSVT/PAT   - Monitor on telemetry  - Per patient and daughter, as well as outpatient records, no TTE since 2021. For TTE today    No further EP intervention, will sign off at this time. Reconsult PRN

## 2023-09-13 NOTE — DISCHARGE NOTE NURSING/CASE MANAGEMENT/SOCIAL WORK - PATIENT PORTAL LINK FT
You can access the FollowMyHealth Patient Portal offered by [facility] by registering at the following website: [email or web address]. By joining [employer]’s FollowMyHealth portal, you will also be able to view your health information using other applications (apps) compatible with our system.

## 2023-09-13 NOTE — DISCHARGE NOTE PROVIDER - NSDCCPCAREPLAN_GEN_ALL_CORE_FT
PRINCIPAL DISCHARGE DIAGNOSIS  Diagnosis: Dizziness  Assessment and Plan of Treatment:      PRINCIPAL DISCHARGE DIAGNOSIS  Diagnosis: Dizziness  Assessment and Plan of Treatment: Your pacemaker was interrogated and was noted to require a generator change. You were also found to have non sustained ventricular tachycardia and have been started on Amiodarone 200mg daily. Please follow up with Electrophysiology for further management. Please follow up with your PMD to evaluate and monitor your liver function and thyroid function while on Amiodarone. You have an appointment card for wound check 10/4 11:40AM and ID card/remote monitor for your PPM carry with you always.         SECONDARY DISCHARGE DIAGNOSES  Diagnosis: Atrial flutter  Assessment and Plan of Treatment: Your Eliquis was stopped for the PPM generator change. Please do not restart your Eliquis until 9/16/2023.

## 2023-09-13 NOTE — PROGRESS NOTE ADULT - PROVIDER SPECIALTY LIST ADULT
Electrophysiology
Internal Medicine

## 2023-09-13 NOTE — DISCHARGE NOTE PROVIDER - HOSPITAL COURSE
78F PMH/PSH including NICM, CHF (EF 50-55 on note from 4/17/23, stable from TTE 2021), s/up upgrade to CRT-D by Dr. 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 States she attempted to sit on chair with wheels when it moved out from under her, causing her to fall. Admitted for further workup. EP cx called, Device interrogation performed revealing it is at [facility], s/p generator change (MDT) 9/12. Site is  c/d/i w/o hematoma, nontender. Reviewed wound care instructions w/ patient. Patient to resume Eliquis on 9/16 at 8am. Patient is stable for discharge home.

## 2023-09-13 NOTE — PROGRESS NOTE ADULT - PROBLEM SELECTOR PLAN 1
resolved  EP help appreciated  s/p PPM generator change  outpatient follow up EP
- Having intermittent sensation of room spinning & lightheaded when standing  - Denies CP, SOB, palpitation, vision change, hearing loss   - EKG A-V paced   - Trop neg, Bnp neg   - Telemetry monitor    - Check orthostatic   - On multiple diuretics iso cardiac hx, could consider med adjustment as tolerated, need to coordinate w/cardio  - Encourage adequate intake, change position slowly    - Pt w/ PPM, pt & family concerned there might be battery problem / PPM issues   - Cardio / EP consult for pacemaker interrogation and further eval  - Cardio consulted in ED, f/u rec
resolved  EP help appreciated  PPM battery change today   continue to monitor on TELE

## 2023-09-13 NOTE — DISCHARGE NOTE PROVIDER - CARE PROVIDER_API CALL
Aniket Raza  Cardiac Electrophysiology  [address] [place] [address] Drive, [address] [place] [id number]  Phone: [phone]  Fax: [phone]  Follow Up Time:

## 2023-09-14 NOTE — ED ADULT NURSE NOTE - CAS DISCH CONDITION
Roberto Braswell is a 44 year-old male who was admitted on 9/13/2023 due to abdominal pain and hematemesis.  His past medical history of previous GI bleeds with recent admission on July 30, 2023.  At that time he had an EGD done per GI which showed a gastric ulcer and duodenal ulcer.  H. pylori was negative per biopsy.  He was discharged on omeprazole and Carafate, but reports persistence of symptoms.  Patient reports that he has been having epigastric abdominal pain, sharp and stabbing in character, without radiation.  He denies any aggravating factors such as NSAID use or alcohol and further denies any alleviating factors.  Does not note any hematochezia or melena.  In the ED, CT abdomen pelvis with contrast was negative for perforation but showed likely duodenitis.  Hemoglobin found to be 8.5, which was previously around 10 2 weeks prior.  GI was consulted in the ED who recommended IV Protonix twice daily, blood transfusion as needed, with plans for EGD.   Stable

## 2023-10-03 NOTE — ED PROVIDER NOTE - PSH
AICD (Automatic Cardioverter/Defibrillator) Present  inserted in Aug, 2008. Due for battery change in 1 month. ( [employer]) . Inserted by Dr Duffy  S/P cholecystectomy    Status post left hemicolectomy 0 Postop Diagnosis: same

## 2023-10-04 ENCOUNTER — APPOINTMENT (OUTPATIENT)
Dept: ELECTROPHYSIOLOGY | Facility: CLINIC | Age: 78
End: 2023-10-04
Payer: MEDICARE

## 2023-10-04 ENCOUNTER — NON-APPOINTMENT (OUTPATIENT)
Age: 78
End: 2023-10-04

## 2023-10-04 VITALS
SYSTOLIC BLOOD PRESSURE: 109 MMHG | WEIGHT: 160 LBS | HEART RATE: 80 BPM | BODY MASS INDEX: 29.44 KG/M2 | DIASTOLIC BLOOD PRESSURE: 69 MMHG | HEIGHT: 62 IN | OXYGEN SATURATION: 98 %

## 2023-10-04 PROCEDURE — 93284 PRGRMG EVAL IMPLANTABLE DFB: CPT

## 2023-10-04 PROCEDURE — 93000 ELECTROCARDIOGRAM COMPLETE: CPT | Mod: 59

## 2023-10-04 PROCEDURE — 99024 POSTOP FOLLOW-UP VISIT: CPT

## 2023-10-23 NOTE — ED ADULT NURSE NOTE - PAIN RATING/NUMBER SCALE (0-10): REST
5 Eliptical Excision Additional Text (Leave Blank If You Do Not Want): The margin was drawn around the clinically apparent lesion.  An elliptical shape was then drawn on the skin incorporating the lesion and margins.  Incisions were then made along these lines to the appropriate tissue plane and the lesion was extirpated.

## 2023-11-13 PROCEDURE — 80053 COMPREHEN METABOLIC PANEL: CPT

## 2023-11-13 PROCEDURE — C8929: CPT

## 2023-11-13 PROCEDURE — 85730 THROMBOPLASTIN TIME PARTIAL: CPT

## 2023-11-13 PROCEDURE — 93005 ELECTROCARDIOGRAM TRACING: CPT

## 2023-11-13 PROCEDURE — C1882: CPT

## 2023-11-13 PROCEDURE — 83735 ASSAY OF MAGNESIUM: CPT

## 2023-11-13 PROCEDURE — 36415 COLL VENOUS BLD VENIPUNCTURE: CPT

## 2023-11-13 PROCEDURE — 83036 HEMOGLOBIN GLYCOSYLATED A1C: CPT

## 2023-11-13 PROCEDURE — 71045 X-RAY EXAM CHEST 1 VIEW: CPT

## 2023-11-13 PROCEDURE — 86900 BLOOD TYPING SEROLOGIC ABO: CPT

## 2023-11-13 PROCEDURE — 33264 RMVL & RPLCMT DFB GEN MLT LD: CPT

## 2023-11-13 PROCEDURE — 85027 COMPLETE CBC AUTOMATED: CPT

## 2023-11-13 PROCEDURE — C1889: CPT

## 2023-11-13 PROCEDURE — 99285 EMERGENCY DEPT VISIT HI MDM: CPT

## 2023-11-13 PROCEDURE — 97161 PT EVAL LOW COMPLEX 20 MIN: CPT

## 2023-11-13 PROCEDURE — 82962 GLUCOSE BLOOD TEST: CPT

## 2023-11-13 PROCEDURE — 94640 AIRWAY INHALATION TREATMENT: CPT

## 2023-11-13 PROCEDURE — 83880 ASSAY OF NATRIURETIC PEPTIDE: CPT

## 2023-11-13 PROCEDURE — 80048 BASIC METABOLIC PNL TOTAL CA: CPT

## 2023-11-13 PROCEDURE — 85025 COMPLETE CBC W/AUTO DIFF WBC: CPT

## 2023-11-13 PROCEDURE — 86850 RBC ANTIBODY SCREEN: CPT

## 2023-11-13 PROCEDURE — 84484 ASSAY OF TROPONIN QUANT: CPT

## 2023-11-13 PROCEDURE — 85610 PROTHROMBIN TIME: CPT

## 2023-11-13 PROCEDURE — 73501 X-RAY EXAM HIP UNI 1 VIEW: CPT

## 2023-11-13 PROCEDURE — 86901 BLOOD TYPING SEROLOGIC RH(D): CPT

## 2024-01-04 ENCOUNTER — APPOINTMENT (OUTPATIENT)
Dept: ELECTROPHYSIOLOGY | Facility: CLINIC | Age: 79
End: 2024-01-04
Payer: MEDICARE

## 2024-01-04 ENCOUNTER — NON-APPOINTMENT (OUTPATIENT)
Age: 79
End: 2024-01-04

## 2024-01-05 PROCEDURE — 93296 REM INTERROG EVL PM/IDS: CPT

## 2024-01-05 PROCEDURE — 93295 DEV INTERROG REMOTE 1/2/MLT: CPT

## 2024-01-15 NOTE — ED ADULT NURSE NOTE - DRUG PRE-SCREENING (DAST -1)
Attempted to contact patient's mother, no answer. Left voicemail, advised of clinic closure tomorrow due to weather and to contact clinic to reschedule appt. Clinic contact number given for return call.   Statement Selected

## 2024-01-21 NOTE — H&P ADULT - CONTRAINDICATIONS ACEI/ARB
-- DO NOT REPLY / DO NOT REPLY ALL --  -- Message is from Engagement Center Operations (ECO) --    General Patient Message: patient missed a call for test results and will like to connect        Alternative phone number:     Can a detailed message be left? Yes    Message Turnaround:     Is it Working Hours? Yes - Working Hours                     
I spoke to the patient regarding the negative Covid PCR results. She is to come back to the urgent care or follow up with PCP if symptoms are not improving or resolving in a few days.   
No

## 2024-01-26 NOTE — CONSULT NOTE ADULT - SUBJECTIVE AND OBJECTIVE BOX
Patient is a 74y old  Female who presents with a chief complaint of Abscess (12 Nov 2019 04:04)      HPI:    74 year old female PMH of HFrEF, s/p AICD, asthma, HTN, HLD, atrial fibrillation on eliquis, with previous perforated diverticulosis, Hartmans procedure with multiple RTOR for abd washouts. Admitted 6/30 - 8/2 for feculent peritonitis with septic shock secondary to perforated diverticulitis Readmitted to [facility] most recently on admitted 9/13/19 - 10/4/19.    CT with CT A/P (9/12) with Left hemicolectomy with right lower quadrant colostomy and ventral abdominal wall dehiscence. Slight interval enlargement of the left paracolic gutter fluid collection measuring up to 3 x 3.9 x 10.2 cm (TR x AP x CC) s/p IR upsize of drain 9/13/19. Repeat bacterial cultures from drainage with ESBL E coli and enterococcus S amp. Repeat CT A/P (9/20) with continued extravasation of contrast to flank collection and skin (continued rectal stump leak). s/p Skin Graft to Abdomen 9/23.    Patient presented to [facility] on 11/8/19 with worsening abdominal pain. WBC on admission of 11.69 with 70% PMN. CT A/P with increased size left ventral wall subcutaneous collection now measuring 4.5 cm. Restarted on Ertapenem.      prior hospital charts reviewed [ x ]  primary team notes reviewed [ x ]  other consultant notes reviewed [ x ]    PAST MEDICAL & SURGICAL HISTORY:  Refusal of blood transfusions as patient is Anabaptist  Patient is Anabaptist  Vertigo  Atrial flutter  Diverticulitis  Cardiomyopathy  Kidney stone  Cardiac Pacemaker  HTN - Hypertension  Gout  Diabetes  Congestive Heart Failure  Asthma  Status post left hemicolectomy  S/P cholecystectomy  AICD (Automatic Cardioverter/Defibrillator) Present: inserted in Aug, 2008. Due for battery change in 1 month. ( [employer]) . Inserted by Dr Duffy      Allergies  Coreg (Other)  digoxin (Other; Short breath (Mild to Mod))  IV Contrast (Unknown)  penicillins (Hives)    ANTIMICROBIALS (past 90 days)  MEDICATIONS  (STANDING):  ertapenem  IVPB   120 mL/Hr IV Intermittent (11-12-19 @ 00:07)      ANTIMICROBIALS:    ertapenem  IVPB 1000 every 24 hours    OTHER MEDS: MEDICATIONS  (STANDING):  acetaminophen  IVPB .. 1000 every 6 hours  dextrose 40% Gel 15 once PRN  dextrose 50% Injectable 12.5 once  dextrose 50% Injectable 25 once  dextrose 50% Injectable 25 once  famotidine    Tablet 20 daily  glucagon  Injectable 1 once PRN  heparin  Infusion 1200 <Continuous>  insulin lispro (HumaLOG) corrective regimen sliding scale  every 6 hours  loratadine 10 daily  montelukast 10 daily    SOCIAL HISTORY:  No smoking, etoh use or drug use.     FAMILY HISTORY:  Family history of brain tumor    REVIEW OF SYSTEMS  [  ] ROS unobtainable because:    [ x ] All other systems negative except as noted below:	    Constitutional:  [ ] fever [ ] chills  [ ] weight loss  [ ] weakness  Skin:  [ ] rash [ ] phlebitis	  Eyes: [ ] icterus [ ] pain  [ ] discharge	  ENMT: [ ] sore throat  [ ] thrush [ ] ulcers [ ] exudates  Respiratory: [ ] dyspnea [ ] hemoptysis [ ] cough [ ] sputum	  Cardiovascular:  [ ] chest pain [ ] palpitations [ ] edema	  Gastrointestinal:  [ ] nausea [ ] vomiting [ ] diarrhea [ ] constipation [x ] pain	  Genitourinary:  [ ] dysuria [ ] frequency [ ] hematuria [ ] discharge [ ] flank pain  [ ] incontinence  Musculoskeletal:  [ ] myalgias [ ] arthralgias [ ] arthritis  [ ] back pain  Neurological:  [ ] headache [ ] seizures  [ ] confusion/altered mental status  Psychiatric:  [ ] anxiety [ ] depression	  Hematology/Lymphatics:  [ ] lymphadenopathy  Endocrine:  [ ] adrenal [ ] thyroid  Allergic/Immunologic:	 [ ] transplant [ ] seasonal    Vital Signs Last 24 Hrs  T(F): 98 (11-12-19 @ 09:35), Max: 99.1 (11-08-19 @ 21:00)  Vital Signs Last 24 Hrs  HR: 83 (11-12-19 @ 09:35) (83 - 100)  BP: 105/69 (11-12-19 @ 09:35) (100/74 - 119/58)  RR: 18 (11-12-19 @ 09:35)  SpO2: 98% (11-12-19 @ 09:35) (95% - 100%)  Wt(kg): --    PHYSICAL EXAMINATION:  General: Alert and Awake, NAD  HEENT: PERRL, EOMI, No subconjunctival hemorrhages, Oropharynx Clear, MMM  Neck: Supple, No SARAHI  Cardiac: RRR, No M/R/G  Resp: CTAB, No Wh/Rh/Ra  Abdomen: +RLQ ostomy, lower midline abdominal wall defect with purulent drainage, LLQ drain with purulent drainage, NBS, ND, mild diffuse tenderness to palpation worst in LLQ, No HSM, No rigidity or guarding  MSK: trace to 1+ LE edema. No stigmata of IE. No evidence of phlebitis. No evidence of synovitis.  : External urinary catheter  Skin: No rashes or lesions. Skin is warm and dry to the touch.   Neuro: Alert and Awake. CN 2-12 Grossly intact. Moves all four extremities spontaneously.  Psych: Calm, Pleasant, Cooperative                          8.1    10.48 )-----------( 276      ( 12 Nov 2019 09:41 )             25.2     11-12    133<L>  |  101  |  7   ----------------------------<  85  3.9   |  23  |  0.57    Ca    9.0      12 Nov 2019 07:40  Phos  4.3     11-12  Mg     1.7     11-12    TPro  6.5  /  Alb  2.5<L>  /  TBili  < 0.2<L>  /  DBili  x   /  AST  16  /  ALT  9   /  AlkPhos  103  11-11    MICROBIOLOGY:  Culture - Urine (collected 08 Nov 2019 22:34)  Source: URINE MIDSTREAM  Final Report (11 Nov 2019 07:25):    CALB^Candida albicans    COLONY COUNT: 10,000-49,000 CFU/mL    Culture - Blood (collected 08 Nov 2019 20:04)  Source: BLOOD VENOUS  Preliminary Report (11 Nov 2019 20:04):    NO ORGANISMS ISOLATED    NO ORGANISMS ISOLATED AT 72 HRS.    Culture - Blood (collected 08 Nov 2019 20:04)  Source: BLOOD PERIPHERAL  Preliminary Report (11 Nov 2019 20:04):    NO ORGANISMS ISOLATED    NO ORGANISMS ISOLATED AT 72 HRS.    RADIOLOGY:  imaging below personally reviewed    EXAM:  CT ABDOMEN AND PELVIS IC    PROCEDURE DATE:  Nov 8 2019   No evidence of acute diverticulitis, as clinically questioned.  Increased size left ventral wall subcutaneous collection now measuring 4.5 cm. Please correlate clinically for superimposed infection. Stable.

## 2024-02-10 ENCOUNTER — EMERGENCY (EMERGENCY)
Facility: HOSPITAL | Age: 79
LOS: 1 days | Discharge: HOME | End: 2024-02-10
Attending: STUDENT IN AN ORGANIZED HEALTH CARE EDUCATION/TRAINING PROGRAM
Payer: MEDICARE

## 2024-02-10 VITALS
OXYGEN SATURATION: 98 % | DIASTOLIC BLOOD PRESSURE: 85 MMHG | WEIGHT: 164.91 LBS | TEMPERATURE: 97 F | HEART RATE: 92 BPM | RESPIRATION RATE: 20 BRPM | SYSTOLIC BLOOD PRESSURE: 123 MMHG | HEIGHT: 63 IN

## 2024-02-10 VITALS — OXYGEN SATURATION: 98 % | SYSTOLIC BLOOD PRESSURE: 114 MMHG | DIASTOLIC BLOOD PRESSURE: 55 MMHG | HEART RATE: 78 BPM

## 2024-02-10 DIAGNOSIS — Z90.49 ACQUIRED ABSENCE OF OTHER SPECIFIED PARTS OF DIGESTIVE TRACT: Chronic | ICD-10-CM

## 2024-02-10 LAB
ALBUMIN SERPL ELPH-MCNC: 4.1 G/DL — SIGNIFICANT CHANGE UP (ref 3.3–5)
ALP SERPL-CCNC: 102 U/L — SIGNIFICANT CHANGE UP (ref 40–120)
ALT FLD-CCNC: 12 U/L — SIGNIFICANT CHANGE UP (ref 10–45)
ANION GAP SERPL CALC-SCNC: 14 MMOL/L — SIGNIFICANT CHANGE UP (ref 5–17)
APTT BLD: 32.4 SEC — SIGNIFICANT CHANGE UP (ref 24.5–35.6)
AST SERPL-CCNC: 16 U/L — SIGNIFICANT CHANGE UP (ref 10–40)
BASE EXCESS BLDV CALC-SCNC: 4.6 MMOL/L — HIGH (ref -2–3)
BASOPHILS # BLD AUTO: 0.05 K/UL — SIGNIFICANT CHANGE UP (ref 0–0.2)
BASOPHILS NFR BLD AUTO: 0.5 % — SIGNIFICANT CHANGE UP (ref 0–2)
BILIRUB SERPL-MCNC: 0.3 MG/DL — SIGNIFICANT CHANGE UP (ref 0.2–1.2)
BUN SERPL-MCNC: 26 MG/DL — HIGH (ref 7–23)
CA-I SERPL-SCNC: 1.24 MMOL/L — SIGNIFICANT CHANGE UP (ref 1.15–1.33)
CALCIUM SERPL-MCNC: 10 MG/DL — SIGNIFICANT CHANGE UP (ref 8.4–10.5)
CHLORIDE BLDV-SCNC: 96 MMOL/L — SIGNIFICANT CHANGE UP (ref 96–108)
CHLORIDE SERPL-SCNC: 96 MMOL/L — SIGNIFICANT CHANGE UP (ref 96–108)
CO2 BLDV-SCNC: 34 MMOL/L — HIGH (ref 22–26)
CO2 SERPL-SCNC: 28 MMOL/L — SIGNIFICANT CHANGE UP (ref 22–31)
CREAT SERPL-MCNC: 1.11 MG/DL — SIGNIFICANT CHANGE UP (ref 0.5–1.3)
EGFR: 52 ML/MIN/1.73M2 — LOW
EOSINOPHIL # BLD AUTO: 0.16 K/UL — SIGNIFICANT CHANGE UP (ref 0–0.5)
EOSINOPHIL NFR BLD AUTO: 1.6 % — SIGNIFICANT CHANGE UP (ref 0–6)
GAS PNL BLDV: 132 MMOL/L — LOW (ref 136–145)
GAS PNL BLDV: SIGNIFICANT CHANGE UP
GAS PNL BLDV: SIGNIFICANT CHANGE UP
GLUCOSE BLDV-MCNC: 143 MG/DL — HIGH (ref 70–99)
GLUCOSE SERPL-MCNC: 137 MG/DL — HIGH (ref 70–99)
HCO3 BLDV-SCNC: 32 MMOL/L — HIGH (ref 22–29)
HCT VFR BLD CALC: 38.6 % — SIGNIFICANT CHANGE UP (ref 34.5–45)
HCT VFR BLDA CALC: 44 % — SIGNIFICANT CHANGE UP (ref 34.5–46.5)
HGB BLD CALC-MCNC: 14.6 G/DL — SIGNIFICANT CHANGE UP (ref 11.7–16.1)
HGB BLD-MCNC: 12.6 G/DL — SIGNIFICANT CHANGE UP (ref 11.5–15.5)
IMM GRANULOCYTES NFR BLD AUTO: 0.4 % — SIGNIFICANT CHANGE UP (ref 0–0.9)
INR BLD: 1.52 RATIO — HIGH (ref 0.85–1.18)
LACTATE BLDV-MCNC: 2 MMOL/L — SIGNIFICANT CHANGE UP (ref 0.5–2)
LIDOCAIN IGE QN: 16 U/L — SIGNIFICANT CHANGE UP (ref 7–60)
LYMPHOCYTES # BLD AUTO: 1.41 K/UL — SIGNIFICANT CHANGE UP (ref 1–3.3)
LYMPHOCYTES # BLD AUTO: 14.4 % — SIGNIFICANT CHANGE UP (ref 13–44)
MCHC RBC-ENTMCNC: 31.7 PG — SIGNIFICANT CHANGE UP (ref 27–34)
MCHC RBC-ENTMCNC: 32.6 GM/DL — SIGNIFICANT CHANGE UP (ref 32–36)
MCV RBC AUTO: 97 FL — SIGNIFICANT CHANGE UP (ref 80–100)
MONOCYTES # BLD AUTO: 0.85 K/UL — SIGNIFICANT CHANGE UP (ref 0–0.9)
MONOCYTES NFR BLD AUTO: 8.7 % — SIGNIFICANT CHANGE UP (ref 2–14)
NEUTROPHILS # BLD AUTO: 7.26 K/UL — SIGNIFICANT CHANGE UP (ref 1.8–7.4)
NEUTROPHILS NFR BLD AUTO: 74.4 % — SIGNIFICANT CHANGE UP (ref 43–77)
NRBC # BLD: 0 /100 WBCS — SIGNIFICANT CHANGE UP (ref 0–0)
PCO2 BLDV: 58 MMHG — HIGH (ref 39–42)
PH BLDV: 7.35 — SIGNIFICANT CHANGE UP (ref 7.32–7.43)
PLATELET # BLD AUTO: 167 K/UL — SIGNIFICANT CHANGE UP (ref 150–400)
PO2 BLDV: 25 MMHG — SIGNIFICANT CHANGE UP (ref 25–45)
POTASSIUM BLDV-SCNC: 4.6 MMOL/L — SIGNIFICANT CHANGE UP (ref 3.5–5.1)
POTASSIUM SERPL-MCNC: 4.8 MMOL/L — SIGNIFICANT CHANGE UP (ref 3.5–5.3)
POTASSIUM SERPL-SCNC: 4.8 MMOL/L — SIGNIFICANT CHANGE UP (ref 3.5–5.3)
PROT SERPL-MCNC: 7.7 G/DL — SIGNIFICANT CHANGE UP (ref 6–8.3)
PROTHROM AB SERPL-ACNC: 15.8 SEC — HIGH (ref 9.5–13)
RBC # BLD: 3.98 M/UL — SIGNIFICANT CHANGE UP (ref 3.8–5.2)
RBC # FLD: 13.6 % — SIGNIFICANT CHANGE UP (ref 10.3–14.5)
SAO2 % BLDV: 32.6 % — LOW (ref 67–88)
SODIUM SERPL-SCNC: 138 MMOL/L — SIGNIFICANT CHANGE UP (ref 135–145)
WBC # BLD: 9.77 K/UL — SIGNIFICANT CHANGE UP (ref 3.8–10.5)
WBC # FLD AUTO: 9.77 K/UL — SIGNIFICANT CHANGE UP (ref 3.8–10.5)

## 2024-02-10 PROCEDURE — 85014 HEMATOCRIT: CPT

## 2024-02-10 PROCEDURE — 84132 ASSAY OF SERUM POTASSIUM: CPT

## 2024-02-10 PROCEDURE — 86900 BLOOD TYPING SEROLOGIC ABO: CPT

## 2024-02-10 PROCEDURE — 82435 ASSAY OF BLOOD CHLORIDE: CPT

## 2024-02-10 PROCEDURE — 85730 THROMBOPLASTIN TIME PARTIAL: CPT

## 2024-02-10 PROCEDURE — 74177 CT ABD & PELVIS W/CONTRAST: CPT | Mod: 26,MA

## 2024-02-10 PROCEDURE — 80053 COMPREHEN METABOLIC PANEL: CPT

## 2024-02-10 PROCEDURE — 86850 RBC ANTIBODY SCREEN: CPT

## 2024-02-10 PROCEDURE — 82803 BLOOD GASES ANY COMBINATION: CPT

## 2024-02-10 PROCEDURE — 85018 HEMOGLOBIN: CPT

## 2024-02-10 PROCEDURE — 96374 THER/PROPH/DIAG INJ IV PUSH: CPT | Mod: XU

## 2024-02-10 PROCEDURE — 93005 ELECTROCARDIOGRAM TRACING: CPT

## 2024-02-10 PROCEDURE — 82330 ASSAY OF CALCIUM: CPT

## 2024-02-10 PROCEDURE — 74177 CT ABD & PELVIS W/CONTRAST: CPT | Mod: MA

## 2024-02-10 PROCEDURE — 83605 ASSAY OF LACTIC ACID: CPT

## 2024-02-10 PROCEDURE — 84295 ASSAY OF SERUM SODIUM: CPT

## 2024-02-10 PROCEDURE — 99285 EMERGENCY DEPT VISIT HI MDM: CPT | Mod: 25

## 2024-02-10 PROCEDURE — 85610 PROTHROMBIN TIME: CPT

## 2024-02-10 PROCEDURE — 86901 BLOOD TYPING SEROLOGIC RH(D): CPT

## 2024-02-10 PROCEDURE — 84484 ASSAY OF TROPONIN QUANT: CPT

## 2024-02-10 PROCEDURE — 83690 ASSAY OF LIPASE: CPT

## 2024-02-10 PROCEDURE — 85025 COMPLETE CBC W/AUTO DIFF WBC: CPT

## 2024-02-10 PROCEDURE — 99285 EMERGENCY DEPT VISIT HI MDM: CPT

## 2024-02-10 PROCEDURE — 82947 ASSAY GLUCOSE BLOOD QUANT: CPT

## 2024-02-10 RX ORDER — SODIUM CHLORIDE 9 MG/ML
500 INJECTION INTRAMUSCULAR; INTRAVENOUS; SUBCUTANEOUS ONCE
Refills: 0 | Status: COMPLETED | OUTPATIENT
Start: 2024-02-10 | End: 2024-02-10

## 2024-02-10 RX ORDER — ACETAMINOPHEN 500 MG
1000 TABLET ORAL ONCE
Refills: 0 | Status: COMPLETED | OUTPATIENT
Start: 2024-02-10 | End: 2024-02-10

## 2024-02-10 RX ORDER — MORPHINE SULFATE 50 MG/1
4 CAPSULE, EXTENDED RELEASE ORAL ONCE
Refills: 0 | Status: DISCONTINUED | OUTPATIENT
Start: 2024-02-10 | End: 2024-02-10

## 2024-02-10 RX ADMIN — Medication 400 MILLIGRAM(S): at 15:26

## 2024-02-10 RX ADMIN — SODIUM CHLORIDE 500 MILLILITER(S): 9 INJECTION INTRAMUSCULAR; INTRAVENOUS; SUBCUTANEOUS at 15:20

## 2024-02-10 NOTE — ED ADULT NURSE NOTE - OBJECTIVE STATEMENT
78 yr old female with h/o  CHF , on Eliquis and Colostomy s/p perforated Abdomen. Pt c/o worsening epigastric pain. Denies chest pain or SOB Denies N/V. Colostomy bag functioning Daughter emptied large amount of nonbloody stool. Denies fever or chills. 78 yr old female with h/o  CHF , on Eliquis , COPD, ASTHMA, GI Bleed and Colostomy s/p perforated Abdomen. Pt c/o worsening epigastric pain and rt mid abd pain. Denies chest pain or SOB.  Denies N/V. Colostomy bag functioning Daughter emptied large amount of nonbloody stool. Denies fever or chills. Denies burn or diff urinating.

## 2024-02-10 NOTE — ED ADULT NURSE NOTE - NSICDXPASTSURGICALHX_GEN_ALL_CORE_FT
PAST SURGICAL HISTORY:  AICD (Automatic Cardioverter/Defibrillator) Present inserted in Aug, 2008. Due for battery change in 1 month. ( [employer]) . Inserted by Dr Duffy    S/P cholecystectomy     Status post left hemicolectomy

## 2024-02-10 NOTE — ED PROVIDER NOTE - CLINICAL SUMMARY MEDICAL DECISION MAKING FREE TEXT BOX
Salvatore Scott, PGY2 - This is a 78 year old female with pmh of NICM, CHF (EF 50-55 on note from 4/17/23, stable from TTE 2021), s/up upgrade to CRT-D by Dr. Duffy in 2016, pAFL on Eliquis, COPD, asthma (Ventolin, Breztri) complicated by respiratory failure (trach and PEG in past), GI bleed, perforated diverticulitis s/p ex lap and Lupe's (7/2019) and colostomy presenting today with worsening epigastric/R mid abdominal pain around her colostomy bag site. Denies any fever/chills/nausea/vomiting. Has not taken any medications for this pain at this time. Accompanied by daughter at bedside. Having good output from colostomy site. No urinary sxs reported. Vitals wnl. Patient complaining of pain, but otherwise well appearing. Abdominal exam consistent with epigastric and R mid abdominal ttp aroudn the colostomy bag site with R mid abdomen. No erythema around the site. Will give IV morphine 4mg for pain control. No nausea/vomiting, will hold off on zofran. Will give IV fluids 500cc. will obtain pre-op labs and obtain Ct abd/pelvis w/ IV contrast to eval for any complications/infections. although suspicion low at this time for SBO or any other etiologies. Dispo pending labs, imaging and reassessment.

## 2024-02-10 NOTE — ED PROVIDER NOTE - PATIENT PORTAL LINK FT
You can access the FollowMyHealth Patient Portal offered by [facility] by registering at the following website: [email or web address]. By joining [employer]’s FollowMyHealth portal, you will also be able to view your health information using other applications (apps) compatible with our system.

## 2024-02-10 NOTE — ED PROVIDER NOTE - NSFOLLOWUPINSTRUCTIONS_ED_ALL_ED_FT
YOU WERE SEEN FOR acute abdominal pain, gas pain    YOU HAD labs and imaging done.   You can take ibuprofen 400mg every 6 to 8 hours and Tylenol 1000mg every 6 to 8 hours as needed for pain.  You can take gas-x (simethicone) as needed for gas pain. This is over the counter medication.     FOLLOW UP WITH YOUR surgeon.    RETURN TO THE EMERGENCY DEPARTMENT FOR worsening pain, vomiting, fever, or any new/concerning symptoms.

## 2024-02-10 NOTE — ED PROVIDER NOTE - NSICDXPASTMEDICALHX_GEN_ALL_CORE_FT
PAST MEDICAL HISTORY:  Asthma     Atrial flutter     Cardiac Pacemaker     Cardiomyopathy     Congestive Heart Failure     Diabetes     Diverticulitis     Gout     HTN - Hypertension     Kidney stone     Patient is Anabaptist     Refusal of blood transfusions as patient is Anabaptist     Vertigo

## 2024-02-10 NOTE — ED PROVIDER NOTE - PHYSICAL EXAMINATION
General: In pain  HEENT: NCAT.   Cardiac: RRR, 2+ radial pulses  Chest: CTA  Abdomen: soft, non-distended, + ttp around colostomy bag, No erythema associated with this. no rebound or guarding. Good output from colostomy bag. brown stool.   Extremities: no peripheral edema, calf tenderness, or leg size discrepancies  Skin: no rashes  Neuro: AAOx3, motor and sensory grossly intact. CN II-XII intact. 5/5 strength upper and lower extremities. Normal sensation bilaterally upper and lower extremities.   Psych: mood and affect appropriate

## 2024-02-10 NOTE — ED PROCEDURE NOTE - ATTENDING CONTRIBUTION TO CARE
Patient left message returning call to the office.   I, Allan Prabhakar, was present for the supervision of the described procedure, and provided assistance in its completion as needed.

## 2024-02-10 NOTE — ED ADULT NURSE NOTE - NSICDXPASTMEDICALHX_GEN_ALL_CORE_FT
PAST MEDICAL HISTORY:  Asthma     Atrial flutter     Cardiac Pacemaker     Cardiomyopathy     Congestive Heart Failure     Diabetes     Diverticulitis     Gout     HTN - Hypertension     Kidney stone     Patient is Jehovah's witness     Refusal of blood transfusions as patient is Jehovah's witness     Vertigo

## 2024-02-10 NOTE — ED PROVIDER NOTE - ATTENDING CONTRIBUTION TO CARE
I, Allan Prabhakar, have performed a history and physical exam on this patient, and discussed their management with the resident. I have fully participated in the care of this patient. I agree with the history, physical exam, and plan as documented by the resident, unless reported as otherwise below:    79 yo F PMHx of NICM, CHF, CRT-D, pAFL on Eliquis, COPD, asthma, perforated diverticulitis s/p ex lap and Lupe's/colostomy, c/b colocutaneous fistula w/ persistent drainage, DM2, presenting to Ed for acute onset L sided abd pain this morning just prior to 11 AM. No vomiting, fevers/chills, changes in ostomy output, or changes in urination. Ostomy producing brown stool. VSS in triage. Ecg w/ paced rhythm, regular rate, not meeting sgarbossa criteria. Denies CP or SOB. Exam w/ ttp in epigastrium and L mid abd. Alert and Ox3. NAD. Plan for CT w/ IV/oral contrast, pre ops given hx and acute abd pain, screening labs, UA, provide symptomatic tx. Will reassess for further diagnostics/interventions, surg consultation, and final dispo. Family and pt agreeable to plan.     Please refer to progress notes/disposition for additional decision making in patient's care.

## 2024-02-10 NOTE — ED PROVIDER NOTE - PROGRESS NOTE DETAILS
Salvatore Scott, PGY2 - patient reassessed. Pain much better. Ct negative. Instructed to f/u with surgeon. Patient verbalized understanding and agrees with the plan. You can take ibuprofen 400mg every 6 to 8 hours and Tylenol 1000mg every 6 to 8 hours as needed for pain. Instructed to use gas x

## 2024-03-06 NOTE — DISCHARGE NOTE NURSING/CASE MANAGEMENT/SOCIAL WORK - NSDCPEPT PROEDHF_GEN_ALL_CORE
Session ID: [id number]  Language: Lithuanian   ID: #[id number]   Name: Demarcus
Report signs and symptoms to primary care provider/Call primary care provider for follow up after discharge/Activities as tolerated/Low salt diet/Monitor weight daily
Bladder non-tender and non-distended. Urine clear yellow.

## 2024-04-03 ENCOUNTER — EMERGENCY (EMERGENCY)
Facility: HOSPITAL | Age: 79
LOS: 1 days | Discharge: HOME | End: 2024-04-03
Attending: STUDENT IN AN ORGANIZED HEALTH CARE EDUCATION/TRAINING PROGRAM
Payer: MEDICARE

## 2024-04-03 VITALS
TEMPERATURE: 98 F | DIASTOLIC BLOOD PRESSURE: 73 MMHG | HEART RATE: 84 BPM | OXYGEN SATURATION: 99 % | WEIGHT: 160.06 LBS | HEIGHT: 63 IN | RESPIRATION RATE: 18 BRPM | SYSTOLIC BLOOD PRESSURE: 119 MMHG

## 2024-04-03 VITALS
OXYGEN SATURATION: 96 % | HEART RATE: 81 BPM | TEMPERATURE: 98 F | DIASTOLIC BLOOD PRESSURE: 67 MMHG | SYSTOLIC BLOOD PRESSURE: 102 MMHG | RESPIRATION RATE: 15 BRPM

## 2024-04-03 DIAGNOSIS — Z90.49 ACQUIRED ABSENCE OF OTHER SPECIFIED PARTS OF DIGESTIVE TRACT: Chronic | ICD-10-CM

## 2024-04-03 LAB
ALBUMIN SERPL ELPH-MCNC: 3.4 G/DL — SIGNIFICANT CHANGE UP (ref 3.3–5)
ALP SERPL-CCNC: 78 U/L — SIGNIFICANT CHANGE UP (ref 40–120)
ALT FLD-CCNC: 9 U/L — LOW (ref 10–45)
ANION GAP SERPL CALC-SCNC: 14 MMOL/L — SIGNIFICANT CHANGE UP (ref 5–17)
APPEARANCE UR: ABNORMAL
APTT BLD: 30 SEC — SIGNIFICANT CHANGE UP (ref 24.5–35.6)
AST SERPL-CCNC: 11 U/L — SIGNIFICANT CHANGE UP (ref 10–40)
BACTERIA # UR AUTO: ABNORMAL /HPF
BASOPHILS # BLD AUTO: 0.03 K/UL — SIGNIFICANT CHANGE UP (ref 0–0.2)
BASOPHILS NFR BLD AUTO: 0.5 % — SIGNIFICANT CHANGE UP (ref 0–2)
BILIRUB SERPL-MCNC: 0.4 MG/DL — SIGNIFICANT CHANGE UP (ref 0.2–1.2)
BILIRUB UR-MCNC: NEGATIVE — SIGNIFICANT CHANGE UP
BUN SERPL-MCNC: 17 MG/DL — SIGNIFICANT CHANGE UP (ref 7–23)
CALCIUM SERPL-MCNC: 9.4 MG/DL — SIGNIFICANT CHANGE UP (ref 8.4–10.5)
CAST: 7 /LPF — HIGH (ref 0–4)
CHLORIDE SERPL-SCNC: 102 MMOL/L — SIGNIFICANT CHANGE UP (ref 96–108)
CO2 SERPL-SCNC: 24 MMOL/L — SIGNIFICANT CHANGE UP (ref 22–31)
COLOR SPEC: SIGNIFICANT CHANGE UP
CREAT SERPL-MCNC: 1.1 MG/DL — SIGNIFICANT CHANGE UP (ref 0.5–1.3)
DIFF PNL FLD: ABNORMAL
EGFR: 52 ML/MIN/1.73M2 — LOW
EOSINOPHIL # BLD AUTO: 0.07 K/UL — SIGNIFICANT CHANGE UP (ref 0–0.5)
EOSINOPHIL NFR BLD AUTO: 1.1 % — SIGNIFICANT CHANGE UP (ref 0–6)
GLUCOSE SERPL-MCNC: 253 MG/DL — HIGH (ref 70–99)
GLUCOSE UR QL: NEGATIVE MG/DL — SIGNIFICANT CHANGE UP
HCT VFR BLD CALC: 33.7 % — LOW (ref 34.5–45)
HGB BLD-MCNC: 10.8 G/DL — LOW (ref 11.5–15.5)
IMM GRANULOCYTES NFR BLD AUTO: 0.3 % — SIGNIFICANT CHANGE UP (ref 0–0.9)
INR BLD: 1.21 RATIO — HIGH (ref 0.85–1.18)
KETONES UR-MCNC: 15 MG/DL
LEUKOCYTE ESTERASE UR-ACNC: ABNORMAL
LYMPHOCYTES # BLD AUTO: 0.98 K/UL — LOW (ref 1–3.3)
LYMPHOCYTES # BLD AUTO: 15.9 % — SIGNIFICANT CHANGE UP (ref 13–44)
MCHC RBC-ENTMCNC: 31.3 PG — SIGNIFICANT CHANGE UP (ref 27–34)
MCHC RBC-ENTMCNC: 32 GM/DL — SIGNIFICANT CHANGE UP (ref 32–36)
MCV RBC AUTO: 97.7 FL — SIGNIFICANT CHANGE UP (ref 80–100)
MONOCYTES # BLD AUTO: 0.7 K/UL — SIGNIFICANT CHANGE UP (ref 0–0.9)
MONOCYTES NFR BLD AUTO: 11.4 % — SIGNIFICANT CHANGE UP (ref 2–14)
NEUTROPHILS # BLD AUTO: 4.36 K/UL — SIGNIFICANT CHANGE UP (ref 1.8–7.4)
NEUTROPHILS NFR BLD AUTO: 70.8 % — SIGNIFICANT CHANGE UP (ref 43–77)
NITRITE UR-MCNC: NEGATIVE — SIGNIFICANT CHANGE UP
NRBC # BLD: 0 /100 WBCS — SIGNIFICANT CHANGE UP (ref 0–0)
PH UR: 5.5 — SIGNIFICANT CHANGE UP (ref 5–8)
PLATELET # BLD AUTO: 182 K/UL — SIGNIFICANT CHANGE UP (ref 150–400)
POTASSIUM SERPL-MCNC: 4.5 MMOL/L — SIGNIFICANT CHANGE UP (ref 3.5–5.3)
POTASSIUM SERPL-SCNC: 4.5 MMOL/L — SIGNIFICANT CHANGE UP (ref 3.5–5.3)
PROT SERPL-MCNC: 6.4 G/DL — SIGNIFICANT CHANGE UP (ref 6–8.3)
PROT UR-MCNC: 30 MG/DL
PROTHROM AB SERPL-ACNC: 13.2 SEC — HIGH (ref 9.5–13)
RBC # BLD: 3.45 M/UL — LOW (ref 3.8–5.2)
RBC # FLD: 13.8 % — SIGNIFICANT CHANGE UP (ref 10.3–14.5)
RBC CASTS # UR COMP ASSIST: 217 /HPF — HIGH (ref 0–4)
REVIEW: SIGNIFICANT CHANGE UP
SODIUM SERPL-SCNC: 140 MMOL/L — SIGNIFICANT CHANGE UP (ref 135–145)
SP GR SPEC: >1.03 — HIGH (ref 1–1.03)
SQUAMOUS # UR AUTO: >36 /HPF — HIGH (ref 0–5)
UROBILINOGEN FLD QL: 1 MG/DL — SIGNIFICANT CHANGE UP (ref 0.2–1)
WBC # BLD: 6.16 K/UL — SIGNIFICANT CHANGE UP (ref 3.8–10.5)
WBC # FLD AUTO: 6.16 K/UL — SIGNIFICANT CHANGE UP (ref 3.8–10.5)
WBC UR QL: 32 /HPF — HIGH (ref 0–5)

## 2024-04-03 PROCEDURE — 85025 COMPLETE CBC W/AUTO DIFF WBC: CPT

## 2024-04-03 PROCEDURE — 85014 HEMATOCRIT: CPT

## 2024-04-03 PROCEDURE — 82947 ASSAY GLUCOSE BLOOD QUANT: CPT

## 2024-04-03 PROCEDURE — 99284 EMERGENCY DEPT VISIT MOD MDM: CPT

## 2024-04-03 PROCEDURE — 84295 ASSAY OF SERUM SODIUM: CPT

## 2024-04-03 PROCEDURE — 82435 ASSAY OF BLOOD CHLORIDE: CPT

## 2024-04-03 PROCEDURE — 81001 URINALYSIS AUTO W/SCOPE: CPT

## 2024-04-03 PROCEDURE — 83690 ASSAY OF LIPASE: CPT

## 2024-04-03 PROCEDURE — 74176 CT ABD & PELVIS W/O CONTRAST: CPT | Mod: MC

## 2024-04-03 PROCEDURE — 82330 ASSAY OF CALCIUM: CPT

## 2024-04-03 PROCEDURE — 84132 ASSAY OF SERUM POTASSIUM: CPT

## 2024-04-03 PROCEDURE — 83605 ASSAY OF LACTIC ACID: CPT

## 2024-04-03 PROCEDURE — 87086 URINE CULTURE/COLONY COUNT: CPT

## 2024-04-03 PROCEDURE — 74176 CT ABD & PELVIS W/O CONTRAST: CPT | Mod: 26,MC

## 2024-04-03 PROCEDURE — 82803 BLOOD GASES ANY COMBINATION: CPT

## 2024-04-03 PROCEDURE — 82962 GLUCOSE BLOOD TEST: CPT

## 2024-04-03 PROCEDURE — 99284 EMERGENCY DEPT VISIT MOD MDM: CPT | Mod: 25

## 2024-04-03 PROCEDURE — 85610 PROTHROMBIN TIME: CPT

## 2024-04-03 PROCEDURE — 80053 COMPREHEN METABOLIC PANEL: CPT

## 2024-04-03 PROCEDURE — 85018 HEMOGLOBIN: CPT

## 2024-04-03 PROCEDURE — 85730 THROMBOPLASTIN TIME PARTIAL: CPT

## 2024-04-03 RX ORDER — ACETAMINOPHEN 500 MG
975 TABLET ORAL ONCE
Refills: 0 | Status: COMPLETED | OUTPATIENT
Start: 2024-04-03 | End: 2024-04-03

## 2024-04-03 RX ORDER — FAMOTIDINE 10 MG/ML
20 INJECTION INTRAVENOUS ONCE
Refills: 0 | Status: DISCONTINUED | OUTPATIENT
Start: 2024-04-03 | End: 2024-04-03

## 2024-04-03 RX ORDER — CYCLOBENZAPRINE HYDROCHLORIDE 10 MG/1
5 TABLET, FILM COATED ORAL ONCE
Refills: 0 | Status: COMPLETED | OUTPATIENT
Start: 2024-04-03 | End: 2024-04-03

## 2024-04-03 RX ORDER — LIDOCAINE 4 G/100G
1 CREAM TOPICAL ONCE
Refills: 0 | Status: COMPLETED | OUTPATIENT
Start: 2024-04-03 | End: 2024-04-03

## 2024-04-03 RX ORDER — SODIUM CHLORIDE 9 MG/ML
1000 INJECTION INTRAMUSCULAR; INTRAVENOUS; SUBCUTANEOUS ONCE
Refills: 0 | Status: DISCONTINUED | OUTPATIENT
Start: 2024-04-03 | End: 2024-04-03

## 2024-04-03 RX ORDER — ONDANSETRON 8 MG/1
4 TABLET, FILM COATED ORAL ONCE
Refills: 0 | Status: DISCONTINUED | OUTPATIENT
Start: 2024-04-03 | End: 2024-04-03

## 2024-04-03 RX ORDER — IBUPROFEN 200 MG
600 TABLET ORAL ONCE
Refills: 0 | Status: COMPLETED | OUTPATIENT
Start: 2024-04-03 | End: 2024-04-03

## 2024-04-03 RX ADMIN — Medication 975 MILLIGRAM(S): at 15:57

## 2024-04-03 RX ADMIN — Medication 600 MILLIGRAM(S): at 07:20

## 2024-04-03 RX ADMIN — LIDOCAINE 1 PATCH: 4 CREAM TOPICAL at 07:00

## 2024-04-03 RX ADMIN — CYCLOBENZAPRINE HYDROCHLORIDE 5 MILLIGRAM(S): 10 TABLET, FILM COATED ORAL at 07:00

## 2024-04-03 RX ADMIN — Medication 600 MILLIGRAM(S): at 07:00

## 2024-04-03 RX ADMIN — Medication 1 TABLET(S): at 15:59

## 2024-04-03 NOTE — ED PROVIDER NOTE - OBJECTIVE STATEMENT
Attending Dr. Marina: Patient is a 80 yo female with complex medical/surgical history, CHF, pAFL on Eliquis, COPD/asthma complicated by respiratory failure (trach and PEG in past), GI bleed, perforated diverticulitis s/p ex lap and Lupe's (7/2019) and colostomy presenting with R lower back pain. Started at rest. Throbbing in nature and worsened with movement. Patient ambulates with rolling walker but was unable due to pain this morning. Took Tylenol at 0400 without relief. Denies fevers, chills, urinary symptoms, numbness/tingling, leg weakness. Denies abdominal pain. Has had normal output into ostomy with one small bowel movement from the rectum today, which is common for her. No recent falls.

## 2024-04-03 NOTE — ED PROVIDER NOTE - NSFOLLOWUPINSTRUCTIONS_ED_ALL_ED_FT
1. Follow up with your PMD within 48-72 hours.   2. Take Bactrim DS 1 tab 2x/day for 7 days.  Increase fluids. Tylenol 650mg every 4-6 hours with food for pain.   3. Worsening pain, new fever, chills, nausea, vomiting return to ER

## 2024-04-03 NOTE — ED ADULT NURSE NOTE - CAS EDP DISCH TYPE
Lab Results   Component Value Date    INR 1 00 01/08/2023    INR 7 46 (HH) 01/07/2023    INR 3 50 (A) 01/03/2023    PROTIME 13 4 01/08/2023    PROTIME 63 6 (H) 01/07/2023    PROTIME 22 6 (H) 12/19/2022     Patient currently on Coumadin 5 mg daily for portal vein thrombosis  On 1/5, patient has had supratherapeutic INR of 3 5  Patient instructed to take Coumadin 5 mg 3 days, 2 5 mg x 4 days and reassess in 3 weeks  Per patient, she was compliant with above recommendations  INR in ED was 7 46   10 mg vitamin K given in ED   INR has since fallen to 1 00   (1/1)    Plan:  · Hold home Coumadin  · Type and screen  · Consent signed for blood products  · Continue to monitor INR  · Obtain price check for [facility] prior to discharge Home

## 2024-04-03 NOTE — ED ADULT TRIAGE NOTE - PAIN: PRESENCE, MLM
ID#[id number] used for encounter    31y  LMP  (@4w6d by LMP) presents with abdominal pain and nausea/vomiting since this morning.  Patient endorses "mild" LLQ pain since this AM that has since resolved.  Also endorses 3 episodes of NB/NB emesis this AM.  Reports nausea and vomiting have also since resolved.  Patient has been actively trying to conceive, and this would be a highly desired pregnancy.  Denies vaginal bleeding.  Denies lightheadedness, dizziness, chest pain, shortness of breath, fevers, chills.      OB/GYN HISTORY:   Physician: Patient sees an OBGYN in [place], does not remember the name of the doctor or the address of the practice, last seen 2 months ago  Ob: FT  (2016)  Gyn: Denies fibroids, cysts, abnormal pap smears, STIs; was taking Depo-Provera for 7 years - last dose May 2022, reports regular menses for the past 3 months.    PMH: Denies    PSH: Denies   Meds: Denies  Allergies: NKDA  
complains of pain/discomfort

## 2024-04-03 NOTE — ED PROVIDER NOTE - PATIENT PORTAL LINK FT
You can access the FollowMyHealth Patient Portal offered by [facility] by registering at the following website: [email or web address]. By joining [employer]’s FollowMyHealth portal, you will also be able to view your health information using other applications (apps) compatible with our system.

## 2024-04-03 NOTE — ED ADULT TRIAGE NOTE - BP NONINVASIVE SYSTOLIC (MM HG)
Chronic Kidney Disease   WHAT YOU NEED TO KNOW:   Chronic kidney disease (CKD) is the gradual and permanent loss of kidney function  It is also called chronic kidney failure, or chronic renal insufficiency  Normally, the kidneys remove fluid, chemicals, and waste from your blood  These wastes are turned into urine by your kidneys  CKD may worsen over time and lead to kidney failure  Your CKD team will help you and your family plan for your care at home  The team will help you create goals and find ways to meet your goals  Your care plan may change over time as your needs change  DISCHARGE INSTRUCTIONS:   Call your local emergency number (911 in the [address]) if:   You have a seizure  You have shortness of breath  Return to the emergency department if:   You are confused and very drowsy  Call your doctor or nephrologist if:   You suddenly gain or lose more weight than your healthcare provider has told you is okay  You have itchy skin or a rash  You urinate more or less than you normally do  You have blood in your urine  You have nausea and are vomiting  You have fatigue or muscle weakness  You have hiccups that will not stop  You have questions or concerns about your condition or care  Medicines:   Medicines  may be given to decrease blood pressure and get rid of extra fluid  You may also receive medicine to manage health conditions that may occur with CKD, such as anemia, diabetes, and heart disease  Take your medicine as directed  Contact your healthcare provider if you think your medicine is not helping or if you have side effects  Tell him or her if you are allergic to any medicine  Keep a list of the medicines, vitamins, and herbs you take  Include the amounts, and when and why you take them  Bring the list or the pill bottles to follow-up visits  Carry your medicine list with you in case of an emergency  What you can do to manage CKD:   Management may include making some lifestyle changes  Tell your healthcare provider if you have any concerns about being able to make changes  He or she can help you find solutions, including working with specialists  Ask for help creating a plan to break large goals into smaller steps  Your plan may include any of the following:  Manage other health conditions  Your healthcare provider will work with you to make a care plan that meets your needs  You will be checked regularly for heart disease or other conditions that can make CKD worse, such as diabetes  Your blood pressure will be closely monitored  You will also get a target blood pressure and help making a plan to reach your target  This may include taking your blood pressure at home  Maintain a healthy weight  Your weight and body mass index (BMI) will be checked regularly  BMI helps find if your weight is healthy for your height  Your healthcare provider will use other tests to check your muscle and protein levels  Extra weight can strain your kidneys  A low weight or low muscle mass can make you feel more tired  You may have trouble doing your daily activities  Ask your provider what a healthy weight is for you  He or she can help you create a plan to lose or gain weight safely, if needed  The plan may include keeping a food diary  This is a list of foods and liquids you have each day  Your provider will use the diary to help you make changes, if needed  Changes are based on your health and any other conditions you have, such as diabetes  Create an exercise plan  Regular exercise can help you manage CKD, high blood pressure, and diabetes  Exercise also helps control weight  Your provider can help you create exercise goals and a plan to reach those goals  For example, your goal may be to exercise for 30 minutes in a day  Your plan can include breaking exercise into 10 minute sessions, 3 times during the day  Create a healthy eating plan    Your provider may tell you to eat food low in potassium, phosphorus, or protein  Your provider may also recommend vitamin or mineral supplements  Do not take any supplements without talking to your provider  A dietitian can help you plan meals if needed  Ask how much liquid to drink each day and which liquids are best for you  Limit sodium (salt) as directed  You may need to limit sodium to less than 2,300 milligrams (mg) each day  Ask your dietitian or healthcare provider how much sodium you can have each day  The amount depends on your stage of kidney disease  Table salt, canned foods, soups, salted snacks, and processed meats, like deli meats and sausage, are high in sodium  Your provider or a dietitian can show you how to read food labels for sodium  Limit alcohol as directed  Alcohol can cause fluid retention and can affect your kidneys  Ask how much alcohol is safe for you  A drink of alcohol is 12 ounces of beer, 5 ounces of wine, or 1½ ounces of liquor  Do not smoke  Nicotine and other chemicals in cigarettes and cigars can cause kidney damage  Ask your provider for information if you currently smoke and need help to quit  E-cigarettes or smokeless tobacco still contain nicotine  Talk to your provider before you use these products  Ask about over-the-counter medicines  Medicines such as NSAIDs and laxatives may harm your kidneys  Some cough and cold medicines can raise your blood pressure  Always ask if a medicine is safe before you take it  Ask about vaccines you may need  CKD can increase your risk for infections such as pneumonia, influenza, and hepatitis  Vaccines lower your risk for infection  Your healthcare provider will tell you which vaccines you need and when to get them  Follow up with your doctor or nephrologist as directed: You will need to return for tests to monitor your kidney and nerve function, and your parathyroid hormone level   Your medicines may be changed, based on certain test results  Write down your questions so you remember to ask them during your visits  © Copyright [employer] 2022 Information is for End User's use only and may not be sold, redistributed or otherwise used for commercial purposes  All illustrations and images included in CareNotes® are the copyrighted property of [employer]  or Boni Washington  The above information is an  only  It is not intended as medical advice for individual conditions or treatments  Talk to your doctor, nurse or pharmacist before following any medical regimen to see if it is safe and effective for you  119

## 2024-04-03 NOTE — ED ADULT NURSE REASSESSMENT NOTE - NS ED NURSE REASSESS COMMENT FT1
0720 Pt in ER gold rm 3. Daughter at stretcher side. Pt states no pain relief from meds given earlier. A&Ox4. Fall risk precautions maintained.

## 2024-04-03 NOTE — ED ADULT NURSE NOTE - OBJECTIVE STATEMENT
79YF A&OX4 PMHX of atrial flutter, CHF, COPD, asthma, diverticulitis, kidney stones, GI bleed, HTN and PSMHX of left hemicolectomy s/p colostomy, cholecystectomy presents to the ED from home c/o R lower back pain for 2 days. pt denies CP, SOB, f/c/n/v/d, urinary symptoms, abdominal pain, 79YF A&OX4 [employer] PMHX of atrial flutter, CHF, COPD, asthma, diverticulitis, kidney stones, GI bleed, HTN and PSMHX of left hemicolectomy s/p colostomy, cholecystectomy presents to the ED from home c/o atraumatic R lower back pain for 2 days. pt denies CP, SOB, f/c/n/v/d, urinary symptoms, abdominal pain, 79YF A&OX4 [employer] PMHX of atrial flutter, CHF, COPD, asthma, diverticulitis, kidney stones, GI bleed, HTN and PSMHX of left hemicolectomy s/p colostomy, cholecystectomy presents to the ED from home c/o atraumatic R lower back pain for 2 days. pt denies CP, SOB, f/c/n/v/d, urinary symptoms, abdominal pain,

## 2024-04-03 NOTE — ED PROVIDER NOTE - CLINICAL SUMMARY MEDICAL DECISION MAKING FREE TEXT BOX
Patient with complex medical/surgical history presenting with atraumatic R lower back pain. Exam: well appearing, abdomen soft/nontender/nondistended, ostomy in place, no skin changes, R lower back muscle hypertonicity and tenderness, no midline spinal tenderness, 5/5 strength, sensation grossly intact. Unable to test gait 2/2 pain. Suspect lower back muscle spasm. No urinary symptoms to suggest pyelo vs stone. No abdominal tenderness to suggest acute abdominal surgical pathology. No red flag back signs. Discussed at length with patient and daughter plan to trial oral meds for now, including NSAIDs and muscle relaxant. Will reassess and escalate pain regimen as necessary. Will obtain urine sample to assess for UTI although very low suspicion.

## 2024-04-03 NOTE — ED PROVIDER NOTE - PROGRESS NOTE DETAILS
UA with large blood, informed patient and will send CBC CMP and CT a/p to eval for renal colic/stone. pt understands the plan. Comfortable currently. -JANAY Bianchi results discussed with pt and daughter, rx for bactrim sent to the pharmacy. strict return precautions advised. discussed the finding of lung nodules and follow up with PMD for outpt chest CT. -JANAY Bianchi

## 2024-04-03 NOTE — ED ADULT NURSE REASSESSMENT NOTE - NS ED NURSE REASSESS COMMENT FT1
0930 Feels better. c/o slight HA  notified. Fingerstick done. Breakfast given.1030Awaiting urine sample

## 2024-04-04 LAB
CULTURE RESULTS: SIGNIFICANT CHANGE UP
SPECIMEN SOURCE: SIGNIFICANT CHANGE UP

## 2024-05-05 NOTE — PROGRESS NOTE ADULT - PROBLEM SELECTOR PROBLEM 1
History     Chief Complaint   Patient presents with    Nausea/Vomiting/Diarrhea       HPI    89 year old female with history of VTE on Eliquis, hypertension, hyperlipidemia brought in by family for evaluation of diarrhea.  For about a week now patient has had soft/liquid stools 1-3 episodes a day.  She has not taken any medicines for this.  She has no associated abdominal pain but feels some gurgling before her bowel movement.  No bloody stools or fevers or recent travel.  She was on antibiotics for 3 days a month ago treating lower extremity infection.  No new medicines.  Has been tolerating oral intake well.          Past Medical History:    Back pain    Essential hypertension    Gout    Hyperlipidemia    Thyroid disease       History reviewed. No pertinent surgical history.    Social History     Socioeconomic History    Marital status:    Tobacco Use    Smoking status: Never    Smokeless tobacco: Never   Vaping Use    Vaping status: Never Used   Substance and Sexual Activity    Alcohol use: No    Drug use: No     Social Determinants of Health     Financial Resource Strain: Medium Risk (4/29/2024)    Received from [facility]    Overall Financial Resource Strain (CARDIA)     Difficulty of Paying Living Expenses: Somewhat hard   Food Insecurity: Patient Unable To Answer (4/29/2024)    Received from [facility]    Hunger Vital Sign     Worried About Running Out of Food in the Last Year: Patient unable to answer     Ran Out of Food in the Last Year: Patient unable to answer   Transportation Needs: No Transportation Needs (4/29/2024)    Received from [facility]    PRAPARE - Transportation     Lack of Transportation (Medical): No     Lack of Transportation (Non-Medical): No   Housing Stability: Unknown (4/29/2024)    Received from [facility]    Housing Stability Vital Sign     Unable to Pay for Housing in the Last Year: No 
    In the last 12 months, was there a time when you did not have a steady place to sleep or slept in a shelter (including now)?: No                   Physical Exam     ED Triage Vitals [05/05/24 0900]   /69   Pulse 78   Resp 22   Temp 97.8 °F (36.6 °C)   Temp src Oral   SpO2 97 %   O2 Device None (Room air)       Physical Exam  Constitutional:       General: She is not in acute distress.  Eyes:      Extraocular Movements: Extraocular movements intact.   Cardiovascular:      Rate and Rhythm: Normal rate.      Pulses: Normal pulses.   Pulmonary:      Effort: Pulmonary effort is normal. No respiratory distress.   Abdominal:      Palpations: Abdomen is soft.      Tenderness: There is no abdominal tenderness. There is no guarding or rebound.   Musculoskeletal:      Cervical back: Normal range of motion.   Neurological:      General: No focal deficit present.      Mental Status: She is alert.              ED Course     Labs Reviewed   BASIC METABOLIC PANEL (8) - Abnormal; Notable for the following components:       Result Value    Glucose 117 (*)     BUN 29 (*)     Creatinine 1.73 (*)     eGFR-Cr 27 (*)     All other components within normal limits   CBC W/ DIFFERENTIAL - Abnormal; Notable for the following components:    HGB 11.2 (*)     MCHC 30.9 (*)     RDW-SD 48.5 (*)     Lymphocyte Absolute 0.70 (*)     All other components within normal limits   SARS-COV-2/FLU A AND B/RSV BY PCR (GENEXPERT) - Normal    Narrative:     This test is intended for the qualitative detection and differentiation of SARS-CoV-2, influenza A, influenza B, and respiratory syncytial virus (RSV) viral RNA in nasopharyngeal or nares swabs from individuals suspected of respiratory viral infection consistent with COVID-19 by their healthcare provider. Signs and symptoms of respiratory viral infection due to SARS-CoV-2, influenza, and RSV can be similar.    Test performed using the Xpert Xpress SARS-CoV-2/FLU/RSV (real time RT-PCR)  assay on 
ICD (implantable cardioverter-defibrillator) battery depletion
the [employer] instrument, [employer], [place] [address].   This test is being used under the Food and Drug Administration's Emergency Use Authorization.    The authorized Fact Sheet for Healthcare Providers for this assay is available upon request from the laboratory.   CBC WITH DIFFERENTIAL WITH PLATELET    Narrative:     The following orders were created for panel order CBC With Differential With Platelet.  Procedure                               Abnormality         Status                     ---------                               -----------         ------                     CBC W/ DIFFERENTIAL[[id number]]          Abnormal            Final result                 Please view results for these tests on the individual orders.   RAINBOW DRAW LAVENDER   RAINBOW DRAW LIGHT GREEN   RAINBOW DRAW BLUE   RAINBOW DRAW GOLD     No results found.        [facility]     Vitals:    05/05/24 0900 05/05/24 1007 05/05/24 1135   BP: 137/69 132/57 130/60   Pulse: 78 70 67   Resp: 22 16 16   Temp: 97.8 °F (36.6 °C)     TempSrc: Oral     SpO2: 97% 99% 99%       Gastroenteritis, gastritis, food related, less likely infectious colitis.  Vitals are reassuring, abdominal exam is benign.    ED Course as of 05/05/24 1659  ------------------------------------------------------------  Time: 05/05 1054  Comment: Renal function is stable for patient.  No leukocytosis, viral panel is negative.  Potassium within normal limits  ------------------------------------------------------------  Time: 05/05 1107  Comment: On reassessment patient remains hemodynamically stable and comfortable.  No stool sample here given.  Will give specimen cup and outpatient orders for stool studies.  Follow-up with PCP and return precautions given.  Patient can trial Imodium at home, discussed risks of constipation with overuse.  Stay well-hydrated.         Disposition and Plan     Clinical Impression:  1. Diarrhea, unspecified type  
      Disposition:  Discharge    Follow-up:  Morteza Isabel MD  [address] [place] [id number]  [phone]    Schedule an appointment as soon as possible for a visit        Medications Prescribed:  Discharge Medication List as of 5/5/2024 11:31 AM              
Atrial flutter, paroxysmal
ICD (implantable cardioverter-defibrillator) battery depletion
ICD (implantable cardioverter-defibrillator) battery depletion
Palpitations
Palpitations
Acute on chronic systolic heart failure, NYHA class 3

## 2024-05-21 NOTE — PATIENT PROFILE ADULT - HAVE YOU EXPERIENCED VIOLENCE OR A TRAUMATIC EVENT?
Outcome  Approved today  Request Reference Number: [id number]. DULERA -5MCG is approved through 12/31/2024. Your patient may now fill this prescription and it will be covered.  Authorization Expiration Date: 12/31/2024  Drug  Dulera 200-5MCG/ACT aerosol    Form  [employer] Medicare Part D Electronic Prior Authorization Form (2017 NCPDP)  Original Claim Info  75 PA Initiated   no

## 2024-05-22 ENCOUNTER — EMERGENCY (EMERGENCY)
Facility: HOSPITAL | Age: 79
LOS: 1 days | Discharge: HOME | End: 2024-05-22
Attending: EMERGENCY MEDICINE
Payer: MEDICARE

## 2024-05-22 VITALS
WEIGHT: 164.91 LBS | SYSTOLIC BLOOD PRESSURE: 132 MMHG | HEIGHT: 63 IN | RESPIRATION RATE: 20 BRPM | HEART RATE: 82 BPM | TEMPERATURE: 99 F | OXYGEN SATURATION: 95 % | DIASTOLIC BLOOD PRESSURE: 75 MMHG

## 2024-05-22 VITALS
RESPIRATION RATE: 16 BRPM | SYSTOLIC BLOOD PRESSURE: 128 MMHG | HEART RATE: 78 BPM | DIASTOLIC BLOOD PRESSURE: 74 MMHG | OXYGEN SATURATION: 97 %

## 2024-05-22 DIAGNOSIS — Z90.49 ACQUIRED ABSENCE OF OTHER SPECIFIED PARTS OF DIGESTIVE TRACT: Chronic | ICD-10-CM

## 2024-05-22 LAB
ADD ON TEST-SPECIMEN IN LAB: SIGNIFICANT CHANGE UP
ALBUMIN SERPL ELPH-MCNC: 3.7 G/DL — SIGNIFICANT CHANGE UP (ref 3.3–5)
ALP SERPL-CCNC: 102 U/L — SIGNIFICANT CHANGE UP (ref 40–120)
ALT FLD-CCNC: 23 U/L — SIGNIFICANT CHANGE UP (ref 10–45)
ANION GAP SERPL CALC-SCNC: 12 MMOL/L — SIGNIFICANT CHANGE UP (ref 5–17)
APPEARANCE UR: CLEAR — SIGNIFICANT CHANGE UP
APTT BLD: 29.5 SEC — SIGNIFICANT CHANGE UP (ref 24.5–35.6)
AST SERPL-CCNC: 18 U/L — SIGNIFICANT CHANGE UP (ref 10–40)
BACTERIA # UR AUTO: NEGATIVE /HPF — SIGNIFICANT CHANGE UP
BASE EXCESS BLDV CALC-SCNC: 2.8 MMOL/L — SIGNIFICANT CHANGE UP (ref -2–3)
BASOPHILS # BLD AUTO: 0.02 K/UL — SIGNIFICANT CHANGE UP (ref 0–0.2)
BASOPHILS NFR BLD AUTO: 0.2 % — SIGNIFICANT CHANGE UP (ref 0–2)
BILIRUB SERPL-MCNC: 0.5 MG/DL — SIGNIFICANT CHANGE UP (ref 0.2–1.2)
BILIRUB UR-MCNC: NEGATIVE — SIGNIFICANT CHANGE UP
BUN SERPL-MCNC: 16 MG/DL — SIGNIFICANT CHANGE UP (ref 7–23)
CA-I SERPL-SCNC: 1.15 MMOL/L — SIGNIFICANT CHANGE UP (ref 1.15–1.33)
CALCIUM SERPL-MCNC: 9.2 MG/DL — SIGNIFICANT CHANGE UP (ref 8.4–10.5)
CAST: 0 /LPF — SIGNIFICANT CHANGE UP (ref 0–4)
CHLORIDE BLDV-SCNC: 103 MMOL/L — SIGNIFICANT CHANGE UP (ref 96–108)
CHLORIDE SERPL-SCNC: 102 MMOL/L — SIGNIFICANT CHANGE UP (ref 96–108)
CO2 BLDV-SCNC: 30 MMOL/L — HIGH (ref 22–26)
CO2 SERPL-SCNC: 24 MMOL/L — SIGNIFICANT CHANGE UP (ref 22–31)
COLOR SPEC: YELLOW — SIGNIFICANT CHANGE UP
CREAT SERPL-MCNC: 1 MG/DL — SIGNIFICANT CHANGE UP (ref 0.5–1.3)
DIFF PNL FLD: ABNORMAL
EGFR: 58 ML/MIN/1.73M2 — LOW
EOSINOPHIL # BLD AUTO: 0.08 K/UL — SIGNIFICANT CHANGE UP (ref 0–0.5)
EOSINOPHIL NFR BLD AUTO: 0.7 % — SIGNIFICANT CHANGE UP (ref 0–6)
FLUAV AG NPH QL: SIGNIFICANT CHANGE UP
FLUBV AG NPH QL: SIGNIFICANT CHANGE UP
GAS PNL BLDV: 133 MMOL/L — LOW (ref 136–145)
GAS PNL BLDV: SIGNIFICANT CHANGE UP
GAS PNL BLDV: SIGNIFICANT CHANGE UP
GLUCOSE BLDV-MCNC: 81 MG/DL — SIGNIFICANT CHANGE UP (ref 70–99)
GLUCOSE SERPL-MCNC: 89 MG/DL — SIGNIFICANT CHANGE UP (ref 70–99)
GLUCOSE UR QL: NEGATIVE MG/DL — SIGNIFICANT CHANGE UP
HCO3 BLDV-SCNC: 29 MMOL/L — SIGNIFICANT CHANGE UP (ref 22–29)
HCT VFR BLD CALC: 38 % — SIGNIFICANT CHANGE UP (ref 34.5–45)
HCT VFR BLDA CALC: 37 % — SIGNIFICANT CHANGE UP (ref 34.5–46.5)
HGB BLD CALC-MCNC: 12.4 G/DL — SIGNIFICANT CHANGE UP (ref 11.7–16.1)
HGB BLD-MCNC: 12.1 G/DL — SIGNIFICANT CHANGE UP (ref 11.5–15.5)
IMM GRANULOCYTES NFR BLD AUTO: 0.4 % — SIGNIFICANT CHANGE UP (ref 0–0.9)
INR BLD: 1.18 RATIO — SIGNIFICANT CHANGE UP (ref 0.85–1.18)
KETONES UR-MCNC: NEGATIVE MG/DL — SIGNIFICANT CHANGE UP
LACTATE BLDV-MCNC: 1.5 MMOL/L — SIGNIFICANT CHANGE UP (ref 0.5–2)
LEUKOCYTE ESTERASE UR-ACNC: NEGATIVE — SIGNIFICANT CHANGE UP
LYMPHOCYTES # BLD AUTO: 1.53 K/UL — SIGNIFICANT CHANGE UP (ref 1–3.3)
LYMPHOCYTES # BLD AUTO: 13.6 % — SIGNIFICANT CHANGE UP (ref 13–44)
MCHC RBC-ENTMCNC: 31.8 GM/DL — LOW (ref 32–36)
MCHC RBC-ENTMCNC: 31.8 PG — SIGNIFICANT CHANGE UP (ref 27–34)
MCV RBC AUTO: 100 FL — SIGNIFICANT CHANGE UP (ref 80–100)
MONOCYTES # BLD AUTO: 0.84 K/UL — SIGNIFICANT CHANGE UP (ref 0–0.9)
MONOCYTES NFR BLD AUTO: 7.5 % — SIGNIFICANT CHANGE UP (ref 2–14)
NEUTROPHILS # BLD AUTO: 8.76 K/UL — HIGH (ref 1.8–7.4)
NEUTROPHILS NFR BLD AUTO: 77.6 % — HIGH (ref 43–77)
NITRITE UR-MCNC: NEGATIVE — SIGNIFICANT CHANGE UP
NRBC # BLD: 0 /100 WBCS — SIGNIFICANT CHANGE UP (ref 0–0)
PCO2 BLDV: 50 MMHG — HIGH (ref 39–42)
PH BLDV: 7.37 — SIGNIFICANT CHANGE UP (ref 7.32–7.43)
PH UR: 7 — SIGNIFICANT CHANGE UP (ref 5–8)
PLATELET # BLD AUTO: 169 K/UL — SIGNIFICANT CHANGE UP (ref 150–400)
PO2 BLDV: 28 MMHG — SIGNIFICANT CHANGE UP (ref 25–45)
POTASSIUM BLDV-SCNC: 5.2 MMOL/L — HIGH (ref 3.5–5.1)
POTASSIUM SERPL-MCNC: 4.2 MMOL/L — SIGNIFICANT CHANGE UP (ref 3.5–5.3)
POTASSIUM SERPL-SCNC: 4.2 MMOL/L — SIGNIFICANT CHANGE UP (ref 3.5–5.3)
PROT SERPL-MCNC: 6.9 G/DL — SIGNIFICANT CHANGE UP (ref 6–8.3)
PROT UR-MCNC: SIGNIFICANT CHANGE UP MG/DL
PROTHROM AB SERPL-ACNC: 12.3 SEC — SIGNIFICANT CHANGE UP (ref 9.5–13)
RBC # BLD: 3.8 M/UL — SIGNIFICANT CHANGE UP (ref 3.8–5.2)
RBC # FLD: 14.6 % — HIGH (ref 10.3–14.5)
RBC CASTS # UR COMP ASSIST: 91 /HPF — HIGH (ref 0–4)
RSV RNA NPH QL NAA+NON-PROBE: SIGNIFICANT CHANGE UP
SAO2 % BLDV: 43.3 % — LOW (ref 67–88)
SARS-COV-2 RNA SPEC QL NAA+PROBE: DETECTED
SODIUM SERPL-SCNC: 138 MMOL/L — SIGNIFICANT CHANGE UP (ref 135–145)
SP GR SPEC: >1.03 — HIGH (ref 1–1.03)
SQUAMOUS # UR AUTO: 10 /HPF — HIGH (ref 0–5)
TROPONIN T, HIGH SENSITIVITY RESULT: 30 NG/L — SIGNIFICANT CHANGE UP (ref 0–51)
TROPONIN T, HIGH SENSITIVITY RESULT: 30 NG/L — SIGNIFICANT CHANGE UP (ref 0–51)
UROBILINOGEN FLD QL: 1 MG/DL — SIGNIFICANT CHANGE UP (ref 0.2–1)
WBC # BLD: 11.27 K/UL — HIGH (ref 3.8–10.5)
WBC # FLD AUTO: 11.27 K/UL — HIGH (ref 3.8–10.5)
WBC UR QL: 10 /HPF — HIGH (ref 0–5)

## 2024-05-22 PROCEDURE — 85025 COMPLETE CBC W/AUTO DIFF WBC: CPT

## 2024-05-22 PROCEDURE — 85610 PROTHROMBIN TIME: CPT

## 2024-05-22 PROCEDURE — 96374 THER/PROPH/DIAG INJ IV PUSH: CPT

## 2024-05-22 PROCEDURE — 71045 X-RAY EXAM CHEST 1 VIEW: CPT

## 2024-05-22 PROCEDURE — 85014 HEMATOCRIT: CPT

## 2024-05-22 PROCEDURE — 99285 EMERGENCY DEPT VISIT HI MDM: CPT

## 2024-05-22 PROCEDURE — 84484 ASSAY OF TROPONIN QUANT: CPT

## 2024-05-22 PROCEDURE — 83880 ASSAY OF NATRIURETIC PEPTIDE: CPT

## 2024-05-22 PROCEDURE — 85730 THROMBOPLASTIN TIME PARTIAL: CPT

## 2024-05-22 PROCEDURE — 74177 CT ABD & PELVIS W/CONTRAST: CPT | Mod: MC

## 2024-05-22 PROCEDURE — 84132 ASSAY OF SERUM POTASSIUM: CPT

## 2024-05-22 PROCEDURE — 71045 X-RAY EXAM CHEST 1 VIEW: CPT | Mod: 26

## 2024-05-22 PROCEDURE — 82803 BLOOD GASES ANY COMBINATION: CPT

## 2024-05-22 PROCEDURE — 85018 HEMOGLOBIN: CPT

## 2024-05-22 PROCEDURE — 82435 ASSAY OF BLOOD CHLORIDE: CPT

## 2024-05-22 PROCEDURE — 84295 ASSAY OF SERUM SODIUM: CPT

## 2024-05-22 PROCEDURE — 87086 URINE CULTURE/COLONY COUNT: CPT

## 2024-05-22 PROCEDURE — 71275 CT ANGIOGRAPHY CHEST: CPT | Mod: 26,MC

## 2024-05-22 PROCEDURE — 87637 SARSCOV2&INF A&B&RSV AMP PRB: CPT

## 2024-05-22 PROCEDURE — 81001 URINALYSIS AUTO W/SCOPE: CPT

## 2024-05-22 PROCEDURE — 93005 ELECTROCARDIOGRAM TRACING: CPT

## 2024-05-22 PROCEDURE — 99285 EMERGENCY DEPT VISIT HI MDM: CPT | Mod: 25

## 2024-05-22 PROCEDURE — 82330 ASSAY OF CALCIUM: CPT

## 2024-05-22 PROCEDURE — 74177 CT ABD & PELVIS W/CONTRAST: CPT | Mod: 26,MC

## 2024-05-22 PROCEDURE — 80053 COMPREHEN METABOLIC PANEL: CPT

## 2024-05-22 PROCEDURE — 82947 ASSAY GLUCOSE BLOOD QUANT: CPT

## 2024-05-22 PROCEDURE — 83605 ASSAY OF LACTIC ACID: CPT

## 2024-05-22 PROCEDURE — 87040 BLOOD CULTURE FOR BACTERIA: CPT

## 2024-05-22 PROCEDURE — 71275 CT ANGIOGRAPHY CHEST: CPT | Mod: MC

## 2024-05-22 RX ORDER — ACETAMINOPHEN 500 MG
1000 TABLET ORAL ONCE
Refills: 0 | Status: COMPLETED | OUTPATIENT
Start: 2024-05-22 | End: 2024-05-22

## 2024-05-22 RX ORDER — SODIUM CHLORIDE 9 MG/ML
2300 INJECTION INTRAMUSCULAR; INTRAVENOUS; SUBCUTANEOUS ONCE
Refills: 0 | Status: DISCONTINUED | OUTPATIENT
Start: 2024-05-22 | End: 2024-05-22

## 2024-05-22 RX ORDER — SODIUM CHLORIDE 9 MG/ML
1000 INJECTION INTRAMUSCULAR; INTRAVENOUS; SUBCUTANEOUS ONCE
Refills: 0 | Status: COMPLETED | OUTPATIENT
Start: 2024-05-22 | End: 2024-05-22

## 2024-05-22 RX ORDER — IPRATROPIUM/ALBUTEROL SULFATE 18-103MCG
3 AEROSOL WITH ADAPTER (GRAM) INHALATION ONCE
Refills: 0 | Status: DISCONTINUED | OUTPATIENT
Start: 2024-05-22 | End: 2024-05-22

## 2024-05-22 RX ADMIN — Medication 400 MILLIGRAM(S): at 09:54

## 2024-05-22 RX ADMIN — SODIUM CHLORIDE 333.33 MILLILITER(S): 9 INJECTION INTRAMUSCULAR; INTRAVENOUS; SUBCUTANEOUS at 09:54

## 2024-05-22 NOTE — ED PROVIDER NOTE - CLINICAL SUMMARY MEDICAL DECISION MAKING FREE TEXT BOX
Patient is a 79-year-old female with a history of NICM, CHF (EF 50-55 from 4/17/23, stable from TTE 2021), s/up upgrade to CRT-D by Dr. 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  Patient states that pain became very severe this morning.  She states it hurts when she takes a deep breath.  She has a persistent cough.  Of note, daughter was diagnosed with COVID about 10 days ago.  Patient's tested positive+3 days ago.  Patient reports she has not been feeling well for about 3 weeks.  She denies any dysuria.  She denies diarrhea in her colostomy bag.  She denies vomiting.  She has chronic intermittent shortness of breath and uses her nebulizer.  She took a treatment prior to coming in today.  She denies any kristian fevers or chills.  on exam, patient has a scattered wheeze.  She has left CVA tenderness and left abdominal tenderness.  Symptoms appear to be intermittent.  She has a known history of nephrolithiasis.  Her rectal temp was 99.6.  Patient also reports the pain is pleuritic.  Differential includes PE, pneumonia, nephrolithiasis, pyelonephritis.  Plan for sepsis labs, CTA chest and CT abdomen pelvis with IV contrast.

## 2024-05-22 NOTE — ED PROVIDER NOTE - PATIENT PORTAL LINK FT
You can access the FollowMyHealth Patient Portal offered by [facility] by registering at the following website: [email or web address]. By joining [employer]’s FollowMyHealth portal, you will also be able to view your health information using other applications (apps) compatible with our system.

## 2024-05-22 NOTE — ED ADULT NURSE NOTE - NSICDXPASTSURGICALHX_GEN_ALL_CORE_FT
PAST SURGICAL HISTORY:  AICD (Automatic Cardioverter/Defibrillator) Present inserted in Aug, 2008. Due for battery change in 1 month. ( [employer]) . Inserted by Dr Duffy    S/P cholecystectomy     Status post left hemicolectomy

## 2024-05-22 NOTE — ED PROVIDER NOTE - NSICDXPASTSURGICALHX_GEN_ALL_CORE_FT
PAST SURGICAL HISTORY:  AICD (Automatic Cardioverter/Defibrillator) Present inserted in Aug, 2008. Due for battery change in 1 month. ( [email or web address]) . Inserted by Dr Duffy    S/P cholecystectomy     Status post left hemicolectomy     
Within 120 minutes [2 hours]  prior to admission

## 2024-05-22 NOTE — ED ADULT NURSE NOTE - OBJECTIVE STATEMENT
Pt is a 80y/o F BIBEMS from home to [facility] ED c/o left flank pain. As per EMS, pt endorsing left flank pain since 0800 with pain on inhalation, COVID positive with a productive cough. Pt endorses cough for approximately 3 weeks, PMH COPD(no 02 at home), DM, HTN, asthma and pacemaker on Eliquis at home. Pt is A&Ox4 currently denying any HA, visual deficits, SOB, CP, palpitations, abd pain, urinary symptoms, n/v/d/c, fever/chills. Pt ambulates with walker at baseline. Call bell within reach, bedrail's up and comfort measures provided

## 2024-05-22 NOTE — ED ADULT NURSE NOTE - ED STAT RN HANDOFF DETAILS
1430 Report given to receiving change of shift KAMAR LOYD  patient is in no acute distress. Patient vital signs stable, plan of care explained.

## 2024-05-22 NOTE — ED PROVIDER NOTE - CHIEF COMPLAINT
The patient is a 79y Female complaining of  The patient is a 79y Female complaining of left flank pain.

## 2024-05-22 NOTE — ED ADULT NURSE NOTE - CHPI ED NUR SYMPTOMS NEG
no anorexia/no bladder dysfunction/no bowel dysfunction/no constipation/no motor function loss/no neck tenderness/no numbness

## 2024-05-22 NOTE — ED PROVIDER NOTE - OBJECTIVE STATEMENT
Christen ARAGON: Patient is a 79-year-old female with a history of NICM, CHF (EF 50-55 from 4/17/23, stable from TTE 2021), s/up upgrade to CRT-D by Dr. 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  Patient states that pain became very severe this morning.  She states it hurts when she takes a deep breath.  She has a persistent cough.  Of note, daughter was diagnosed with COVID about 10 days ago.  Patient's tested positive+3 days ago.  Patient reports she has not been feeling well for about 3 weeks.  She denies any dysuria.  She denies diarrhea in her colostomy bag.  She denies vomiting.  She has chronic intermittent shortness of breath and uses her nebulizer.  She took a treatment prior to coming in today.  She denies any kristian fevers or chills.

## 2024-05-22 NOTE — ED PROVIDER NOTE - PROGRESS NOTE DETAILS
Christen ARAGON: Patient has a history of CHF. Will restrict fluids to 1 L for now. Her rectal temp is < 100.4. Discussed incident findings of " 1.8 cm nodular opacity is noted in the left lower lobe. Etiology is unclear. Follow-up CT scan is recommended in 3 months to ensure   resolution. Large ventral hernia containing the stomach, small and large bowel, and   portion of the left lobe of the liver." Answered all questions/concerns. Offered pt duoneb while in ED. She declined and opted for dc home. Odalis Helton PA-C Christen ARAGON: Results printed and discussed in detail with patient and her daughter. A copy was provided to them. Return precautions discussed. Patient states she has prednisone at home. Patient tells me she is comfortable with discharge. She understands to return for worsening shortness of breath, new or worsening pain, vomiting, new symptoms of concern. All questions answered at time of discharge.

## 2024-05-22 NOTE — ED PROVIDER NOTE - NSFOLLOWUPINSTRUCTIONS_ED_ALL_ED_FT
1. Follow up with your pcp within 2-3 days.   2. Rest. Hydrate.   3. Take tylenol as needed for pain/fever.   4. You may take steroids 40 mg daily for next 5 days.   5. Return to the emergency department if you have worsening pain, difficulty breathing, fevers, chest pain, dizziness, vomiting, or all other concerning symptoms.

## 2024-05-23 LAB
CULTURE RESULTS: SIGNIFICANT CHANGE UP
SPECIMEN SOURCE: SIGNIFICANT CHANGE UP

## 2024-06-04 ENCOUNTER — APPOINTMENT (OUTPATIENT)
Dept: OTOLARYNGOLOGY | Facility: CLINIC | Age: 79
End: 2024-06-04
Payer: MEDICARE

## 2024-06-04 ENCOUNTER — NON-APPOINTMENT (OUTPATIENT)
Age: 79
End: 2024-06-04

## 2024-06-04 VITALS — HEART RATE: 64 BPM

## 2024-06-04 DIAGNOSIS — H61.20 IMPACTED CERUMEN, UNSPECIFIED EAR: ICD-10-CM

## 2024-06-04 PROCEDURE — 69210 REMOVE IMPACTED EAR WAX UNI: CPT

## 2024-06-04 PROCEDURE — 99202 OFFICE O/P NEW SF 15 MIN: CPT | Mod: 25

## 2024-06-04 NOTE — PHYSICAL EXAM
[de-identified] : ALEKSANDR IMPACTED CERUMEN REMOVED/ HEARING IMPROVED [Normal] : mucosa is normal [Midline] : trachea located in midline position

## 2024-06-04 NOTE — REVIEW OF SYSTEMS
[Sneezing] : sneezing [Seasonal Allergies] : seasonal allergies [Hearing Loss] : hearing loss [Wheezing] : wheezing [Cough] : cough [Negative] : Heme/Lymph [Patient Intake Form Reviewed] : Patient intake form was reviewed [FreeTextEntry6] : Noisy breathing  [FreeTextEntry1] : Daytime sleepiness

## 2024-06-05 ENCOUNTER — RESULT CHARGE (OUTPATIENT)
Age: 79
End: 2024-06-05

## 2024-06-06 ENCOUNTER — APPOINTMENT (OUTPATIENT)
Dept: ELECTROPHYSIOLOGY | Facility: CLINIC | Age: 79
End: 2024-06-06

## 2024-06-06 ENCOUNTER — NON-APPOINTMENT (OUTPATIENT)
Age: 79
End: 2024-06-06

## 2024-06-06 ENCOUNTER — APPOINTMENT (OUTPATIENT)
Dept: CV DIAGNOSITCS | Facility: HOSPITAL | Age: 79
End: 2024-06-06

## 2024-06-06 VITALS
HEART RATE: 88 BPM | HEIGHT: 62 IN | SYSTOLIC BLOOD PRESSURE: 111 MMHG | OXYGEN SATURATION: 94 % | BODY MASS INDEX: 28.52 KG/M2 | DIASTOLIC BLOOD PRESSURE: 71 MMHG | WEIGHT: 155 LBS

## 2024-06-06 PROCEDURE — 93283 PRGRMG EVAL IMPLANTABLE DFB: CPT

## 2024-06-06 PROCEDURE — 93000 ELECTROCARDIOGRAM COMPLETE: CPT | Mod: 59

## 2024-06-07 RX ORDER — CETIRIZINE HYDROCHLORIDE 10 MG/1
10 TABLET, COATED ORAL DAILY
Refills: 0 | Status: DISCONTINUED | COMMUNITY
Start: 2017-03-27 | End: 2024-06-07

## 2024-06-07 RX ORDER — ENALAPRIL MALEATE 10 MG/1
10 TABLET ORAL
Qty: 180 | Refills: 1 | Status: DISCONTINUED | COMMUNITY
Start: 2018-01-03 | End: 2024-06-07

## 2024-06-07 RX ORDER — NYSTATIN 100MM UNIT
POWDER (EA) MISCELLANEOUS
Qty: 1 | Refills: 1 | Status: DISCONTINUED | COMMUNITY
Start: 2021-03-12 | End: 2024-06-07

## 2024-06-07 RX ORDER — INSULIN GLARGINE 300 U/ML
300 INJECTION, SOLUTION SUBCUTANEOUS
Refills: 0 | Status: ACTIVE | COMMUNITY
Start: 2024-06-07

## 2024-06-07 RX ORDER — VITAMIN K2 90 MCG
125 MCG CAPSULE ORAL
Refills: 0 | Status: ACTIVE | COMMUNITY
Start: 2024-06-07

## 2024-06-07 RX ORDER — DILTIAZEM HYDROCHLORIDE 60 MG/1
60 CAPSULE, EXTENDED RELEASE ORAL
Refills: 0 | Status: ACTIVE | COMMUNITY
Start: 2024-04-23

## 2024-06-07 RX ORDER — MAGNESIUM OXIDE 400 MG
400 (241.3 MG) TABLET ORAL
Refills: 0 | Status: ACTIVE | COMMUNITY
Start: 2017-08-07

## 2024-06-07 RX ORDER — INSULIN GLARGINE 100 [IU]/ML
100 INJECTION, SOLUTION SUBCUTANEOUS
Qty: 30 | Refills: 0 | Status: DISCONTINUED | COMMUNITY
Start: 2020-06-26 | End: 2024-06-07

## 2024-06-07 RX ORDER — MONTELUKAST 10 MG/1
10 TABLET, FILM COATED ORAL DAILY
Refills: 0 | Status: DISCONTINUED | COMMUNITY
Start: 2018-03-30 | End: 2024-06-07

## 2024-06-07 RX ORDER — FERROUS SULFATE TAB EC 324 MG (65 MG FE EQUIVALENT) 324 (65 FE) MG
324 (65 FE) TABLET DELAYED RESPONSE ORAL
Refills: 0 | Status: ACTIVE | COMMUNITY
Start: 2024-06-07

## 2024-06-07 RX ORDER — MONTELUKAST SODIUM 10 MG/1
10 TABLET, FILM COATED ORAL
Refills: 0 | Status: ACTIVE | COMMUNITY
Start: 2024-06-07

## 2024-06-07 RX ORDER — INSULIN LISPRO 100 [IU]/ML
100 INJECTION, SOLUTION INTRAVENOUS; SUBCUTANEOUS
Refills: 0 | Status: ACTIVE | COMMUNITY
Start: 2024-06-07

## 2024-06-07 RX ORDER — BUDESONIDE, GLYCOPYRROLATE, AND FORMOTEROL FUMARATE 160; 9; 4.8 UG/1; UG/1; UG/1
160-9-4.8 AEROSOL, METERED RESPIRATORY (INHALATION)
Refills: 0 | Status: ACTIVE | COMMUNITY
Start: 2024-06-07

## 2024-06-07 RX ORDER — OMEPRAZOLE 20 MG/1
20 CAPSULE, DELAYED RELEASE ORAL DAILY
Refills: 0 | Status: DISCONTINUED | COMMUNITY
Start: 2024-06-07 | End: 2024-06-07

## 2024-06-07 RX ORDER — OXYCODONE 5 MG/1
5 TABLET ORAL
Qty: 12 | Refills: 0 | Status: DISCONTINUED | COMMUNITY
Start: 2023-05-01 | End: 2024-06-07

## 2024-06-07 RX ORDER — IPRATROPIUM BROMIDE AND ALBUTEROL SULFATE 2.5; .5 MG/3ML; MG/3ML
0.5-2.5 (3) SOLUTION RESPIRATORY (INHALATION)
Refills: 0 | Status: DISCONTINUED | COMMUNITY
Start: 2020-03-09 | End: 2024-06-07

## 2024-07-24 NOTE — BRIEF OPERATIVE NOTE - NSICDXBRIEFPROCEDURE_GEN_ALL_CORE_FT
Diagnosis: [x] BIBIANA (G47.33) [] CSA (G47.31) [] Apnea (G47.30)   Length of Need: [x] 15 Months [] 99 Months [] Other:   Machine (KAVIN!): [] Respironics Dream Station      Auto [] ResMed AirSense     Auto [] Other:     []  CPAP () [] Bilevel ()   Mode: [] Auto [] Spontaneous    Mode: [] Auto [] Spontaneous              Comfort Settings:      Humidifier: [] Heated ()        [x] Water chamber replacement ()/ 1 per 6 months        Mask:   [x] Nasal () /1 per 3 months [] Full Face () /1 per 3 months   [x] Patient choice -Size and fit mask [] Patient Choice - Size and fit mask   [] Dispense: [] Dispense:   [x] Headgear () / 1 per 3 months [] Headgear () / 1 per 3 months   [] Replacement Nasal Cushion ()/2 per month [] Interface Replacement ()/1 per month   [x] Replacement Nasal Pillows ()/2 per month         Tubing: [x] Heated ()/1 per 3 months    [] Standard ()/1 per 3 months [] Other:           Filters: [x] Non-disposable ()/1 per 6 months     [x] Ultra-Fine, Disposable ()/2 per month        Miscellaneous: [] Chin Strap ()/ 1 per 6 months [] O2 bleed-in:        LPM   [] Oxymetry on CPAP/Bilevel []  Other:         Start Order Date: 07/24/24    MEDICAL JUSTIFICATION:  I, the undersigned, certify that the above prescribed supplies are medically necessary for this patient’s wellbeing.  In my opinion, the supplies are both reasonable and necessary in reference to accepted standards of medicalpractice in treatment of this patient’s condition.    JL RADFORD NP    NPI: [id number]       Order Signed Date: 07/24/24  [facility]  Pulmonary, Sleep, and Critical Care    Pulmonary, Sleep, and Critical Care  [facility] [address]                          [address] [place] [address] [address]  [place] [address]                                    [place] [address]  Phone: [phone]    Fax: 
PROCEDURES:  Open repair of incisional hernia using biological mesh 11-Jul-2019 21:48:05  Aleksandr Perez  Third look laparotomy 05-Jul-2019 11:33:14  Aleksandr Perez
PROCEDURES:  Third look laparotomy 05-Jul-2019 11:33:14  Aleksandr Perez
PROCEDURES:  Left hemicolectomy with colostomy 02-Jul-2019 14:13:07  Aleksandr Perez  Exploratory laparotomy 02-Jul-2019 14:12:45  Aleksandr Perez
PROCEDURES:  Abdominal washout 03-Jul-2019 13:18:38 Abthera vac placement Prasanth Neves
PROCEDURES:  Abdominal washout 03-Jul-2019 13:18:38 Abthera vac placement Prasanth Neves

## 2024-07-31 NOTE — ED ADULT NURSE NOTE - NSFALLRSKUNASSIST_ED_ALL_ED
Virtual Visit Details    Type of service:  Video Visit     Originating Location (pt. Location): Home  Distant Location (provider location):  Off-site  Platform used for Video Visit: Tomas     no

## 2024-08-06 NOTE — H&P ADULT. - NEGATIVE GENERAL GENITOURINARY SYMPTOMS
Closure 4 Information: This tab is for additional flaps and grafts above and beyond our usual structured repairs.  Please note if you enter information here it will not currently bill and you will need to add the billing information manually. Include Mohsaiq Anticoagulation Items In The Validation Algorithm?: Yes A-T Advancement Flap Text: The defect edges were debeveled with a #15 scalpel blade.  Given the location of the defect, shape of the defect and the proximity to free margins an A-T advancement flap was deemed most appropriate.  Using a sterile surgical marker, an appropriate advancement flap was drawn incorporating the defect and placing the expected incisions within the relaxed skin tension lines where possible.    The area thus outlined was incised deep to adipose tissue with a #15 scalpel blade.  The skin margins were undermined to an appropriate distance in all directions utilizing iris scissors. Unique Flap 1 Text: Given the location of the defect, shape of the defect and the proximity to free margins a Erasto rotation flap was deemed most appropriate.  Using a sterile surgical marker, an appropriate rotation flap was drawn incorporating the defect and placing the expected incisions within the nasal cosmetic unit and extending in a curvilinear fashion along the nasofacial junction and continuing to the superior nasoglabellar junction.  Finishing with a tagential orientation of the incision.   The area thus outlined was incised deep to muscle and undermined in the submuscular plane of tissue with a #10 scalpel blade.  The skin margins were undermined to an appropriate distance in all directions utilizing iris scissors. The flap was rotated into the defect.  A small tricone was removed at the distal tip of the flap. Localized Dermabrasion With Sand Papertext: The patient was draped in routine manner.  Localized dermabrasion using sterile sand paper was performed in routine manner to papillary dermis. This spot dermabrasion is being performed to complete skin cancer reconstruction. It also will eliminate the other sun damaged precancerous cells that are known to be part of the regional effect of a lifetime's worth of sun exposure. This localized dermabrasion is therapeutic and should not be considered cosmetic in any regard. Special Stains Stage 4 - Results: Base On Clearance Noted Above Referred To Mid-Level For Closure Text (Leave Blank If You Do Not Want): After obtaining clear surgical margins the patient was sent to a mid-level provider for surgical repair.  The patient understands they will receive post-surgical care and follow-up from the mid-level provider. Undermining Type: Entire Wound Donor Site Anesthesia Type: same as repair anesthesia No Residual Tumor Seen Histology Text: There were no malignant cells seen in the sections examined. Peng Advancement Flap Text: The defect edges were debeveled with a #15 scalpel blade.  Given the location of the defect, shape of the defect and the proximity to free margins a Peng advancement flap was deemed most appropriate.  Using a sterile surgical marker, an appropriate advancement flap was drawn incorporating the defect and placing the expected incisions within the relaxed skin tension lines where possible. The area thus outlined was incised deep to adipose tissue with a #15 scalpel blade.  The skin margins were undermined to an appropriate distance in all directions utilizing iris scissors. Quadrant Reporting?: no Consent (Marginal Mandibular)/Introductory Paragraph: The rationale for Mohs was explained to the patient and consent was obtained. The risks, benefits and alternatives to therapy were discussed in detail. Specifically, the risks of damage to the marginal mandibular branch of the facial nerve, infection, scarring, bleeding, prolonged wound healing, incomplete removal, allergy to anesthesia, and recurrence were addressed. Prior to the procedure, the treatment site was clearly identified and confirmed by the patient. All components of Universal Protocol/PAUSE Rule completed. Modified Advancement Flap Text: The defect edges were debeveled with a #15 scalpel blade.  Given the location of the defect, shape of the defect and the proximity to free margins a modified advancement flap was deemed most appropriate.  Using a sterile surgical marker, an appropriate advancement flap was drawn incorporating the defect and placing the expected incisions within the relaxed skin tension lines where possible.    The area thus outlined was incised deep to adipose tissue with a #15 scalpel blade.  The skin margins were undermined to an appropriate distance in all directions utilizing iris scissors. Double O-Z Flap Text: The defect edges were debeveled with a #15 scalpel blade.  Given the location of the defect, shape of the defect and the proximity to free margins a Double O-Z flap was deemed most appropriate.  Using a sterile surgical marker, an appropriate transposition flap was drawn incorporating the defect and placing the expected incisions within the relaxed skin tension lines where possible. The area thus outlined was incised deep to adipose tissue with a #15 scalpel blade.  The skin margins were undermined to an appropriate distance in all directions utilizing iris scissors. Oculoplastic Surgeon Procedure Text (F): After obtaining clear surgical margins the patient was sent to oculoplastics for surgical repair.  The patient understands they will receive post-surgical care and follow-up from the referring physician's office. Anesthesia Type: 0.5% lidocaine with 1:200,000 epinephrine and a 1:10 solution of 8.4% sodium bicarbonate Wound Care: Antibiotic ointment Area L Indication Text: Tumors in this location are included in Area L (trunk and extremities).  Mohs surgery is indicated for larger tumors, or tumors with aggressive histologic features, in these anatomic locations. Stage 12: Number Of Blocks?: 0 Mixed Nodular And Micronodular Bcc Histology Text: Emanating from the epidermis and infiltrating the dermis are irregularly shaped islands of basaloid keratinocytes. The cells have scant cytoplasm and round dark nuclei. Mitotic figures and apoptotic bodies are evident. The nuclei at the periphery of the islands have a palisaded arrangement. The islands are associated with a fibromyxoid stroma and there is a cleft formation between some of the islands and stroma.  Emanating from the epidermis and infiltrating the dermis are irregularly shaped islands of basaloid keratinocytes. The cells have scant cytoplasm and dark round nuclei. Mitotic figures and apoptotic bodies are evident. The nuclei at the periphery of the islands have a palisaded arrangement. The islands are associated with a fibromyxoid stroma and there is cleft formation between some of the islands and stroma. In areas the tumor breaks up into a small, micronodular, infiltrative growth pattern with deeper extension into the dermis. Cheiloplasty (Less Than 50%) Text: A decision was made to reconstruct the defect with a  cheiloplasty.  The defect was undermined extensively.  Additional obicularis oris muscle was excised with a 15 blade scalpel.  The defect was converted into a full thickness wedge, of less than 50% of the vertical height of the lip, to facilite a better cosmetic result.  Small vessels were then tied off with 5-0 monocyrl. The obicularis oris, superficial fascia, adipose and dermis were then reapproximated.  After the deeper layers were approximated the epidermis was reapproximated with particular care given to realign the vermillion border. Lazy S Complex Repair Preamble Text (Leave Blank If You Do Not Want): Extensive wide undermining was performed. Xenograft Text: The defect edges were debeveled with a #15 scalpel blade.  Given the location of the defect, shape of the defect and the proximity to free margins a xenograft was deemed most appropriate.  The graft was then trimmed to fit the size of the defect.  The graft was then placed in the primary defect and oriented appropriately. Asc Procedure Text (A): After obtaining clear surgical margins the patient was sent to an ASC for surgical repair.  The patient understands they will receive post-surgical care and follow-up from the ASC physician. Suture Removal: 14 days Bcc Infiltrative Histology Text: Irregularly shaped cords and strands of basaloid keratinocytes infiltrate the dermis with a spiky growth pattern. The cells have scant cytoplasm and round dark nuclei. Mitotic figures and apoptotic bodies are evident. The nuclei at the periphery of the islands have a palisaded arrangement. The islands are associated with a fibromyxoid stroma and there is a cleft formation between some of the islands and stroma. Inflammation Suggestive Of Cancer Camouflage Histology Text: There was a dense lymphocytic infiltrate which prevented adequate histologic evaluation of adjacent structures. Rectangular Flap Text: The defect edges were debeveled with a #15 scalpel blade. Given the location of the defect and the proximity to free margins a rectangular flap was deemed most appropriate. Using a sterile surgical marker, an appropriate rectangular flap was drawn incorporating the defect. The area thus outlined was incised deep to adipose tissue with a #15 scalpel blade. The skin margins were undermined to an appropriate distance in all directions utilizing iris scissors. Following this, the designed flap was carried over into the primary defect and sutured into place. Consent (Spinal Accessory)/Introductory Paragraph: The rationale for Mohs was explained to the patient and consent was obtained. The risks, benefits and alternatives to therapy were discussed in detail. Specifically, the risks of damage to the spinal accessory nerve, infection, scarring, bleeding, prolonged wound healing, incomplete removal, allergy to anesthesia, and recurrence were addressed. Prior to the procedure, the treatment site was clearly identified and confirmed by the patient. All components of Universal Protocol/PAUSE Rule completed. Plastic Surgeon Procedure Text (A): After obtaining clear surgical margins the patient was sent to plastics for surgical repair.  The patient understands they will receive post-surgical care and follow-up from the referring physician's office. Mucosal Advancement Flap Text: Given the location of the defect, shape of the defect and the proximity to free margins a mucosal advancement flap was deemed most appropriate. Incisions were made with a 15 blade scalpel in the appropriate fashion along the cutaneous vermillion border and the mucosal lip. The remaining actinically damaged mucosal tissue was excised.  The mucosal advancement flap was then elevated to the gingival sulcus with care taken to preserve the neurovascular structures and advanced into the primary defect. Care was taken to ensure that precise realignment of the vermillion border was achieved. Mart-1 - Positive Histology Text: MART-1 staining demonstrates areas of higher density and clustering of melanocytes with Pagetoid spread upwards within the epidermis. The surgical margins are positive for tumor cells. Double O-Z Plasty Text: The defect edges were debeveled with a #15 scalpel blade.  Given the location of the defect, shape of the defect and the proximity to free margins a Double O-Z plasty (double transposition flap) was deemed most appropriate.  Using a sterile surgical marker, the appropriate transposition flaps were drawn incorporating the defect and placing the expected incisions within the relaxed skin tension lines where possible. The area thus outlined was incised deep to adipose tissue with a #15 scalpel blade.  The skin margins were undermined to an appropriate distance in all directions utilizing iris scissors.  Hemostasis was achieved with electrocautery.  The flaps were then transposed into place, one clockwise and the other counterclockwise, and anchored with interrupted buried subcutaneous sutures. Epidermal Closure Graft Donor Site (Optional): simple interrupted Hemigard Retention Suture: 2-0 Nylon Deep Sutures: 4-0 Polysorb Abbe Flap (Lower To Upper Lip) Text: The defect of the upper lip was assessed and measured.  Given the location and size of the defect, an Abbe flap was deemed most appropriate.  Using a sterile surgical marker, an appropriate Abbe flap was measured and drawn on the lower lip. Local anesthesia was then infiltrated. A scalpel was then used to incise the upper lip through and through the skin, vermilion, muscle and mucosa, leaving the flap pedicled on the opposite side.  The flap was then rotated and transferred to the lower lip defect.  The flap was then sutured into place with a three layer technique, closing the orbicularis oris muscle layer with subcutaneous buried sutures, followed by a mucosal layer and an epidermal layer. Presence Of Scar Tissue (For Histology): present Metatypical Bcc Histology Text: Multiple islands of malignant cells throughout the dermis, exhibiting both basaloid and squamous differentiation. The areas had Basal Cell Carcinoma, with large and rounded basaloid cells, and Squamous Cell Carcinoma, with polygonal squamoid cells with abundant eosinophilic cytoplasm, large nuclei, and prominent nucleoli. Ear Wedge Repair Text: A wedge excision was completed by carrying down an excision through the full thickness of the ear and cartilage with an inward facing Burow's triangle. The wound was then closed in a layered fashion. Primary Defect Width In Cm (Final Defect Size - Required For Flaps/Grafts): 1.1 Retention Suture Text: Retention sutures were placed to support the closure and prevent dehiscence. Brow Lift Text: A midfrontal incision was made medially to the defect to allow access to the tissues just superior to the left eyebrow. Following careful dissection inferiorly in a supraperiosteal plane to the level of the left eyebrow, several 3-0 monocryl sutures were used to resuspend the eyebrow orbicularis oculi muscular unit to the superior frontal bone periosteum. This resulted in an appropriate reapproximation of static eyebrow symmetry and correction of the left brow ptosis. Number Of Stages: 1 Why Was The Change Made?: Please Select the Appropriate Response Advancement-Rotation Flap Text: The defect edges were debeveled with a #10 scalpel blade.  Given the location of the defect, shape of the defect and the proximity to free margins an advancement-rotation flap was deemed most appropriate.  Using a sterile surgical marker, an appropriate flap was drawn incorporating the defect and placing the expected incisions within the relaxed skin tension lines where possible. The area thus outlined was incised deep to adipose tissue with a #10 scalpel blade.  The skin margins were undermined to an appropriate distance in all directions utilizing iris scissors. Hemigard Postcare Instructions: The HEMIGARD strips are to remain completely dry for at least 5-7 days. normal urinary frequency Perineural Invasion (For Histology - Be Specific If Possible): absent Estimated Blood Loss (Cc): minimal Melolabial Transposition Flap Text: The defect edges were debeveled with a #15 scalpel blade.  Given the location of the defect and the proximity to free margins a melolabial flap was deemed most appropriate.  Using a sterile surgical marker, an appropriate melolabial transposition flap was drawn incorporating the defect.    The area thus outlined was incised deep to adipose tissue with a #15 scalpel blade.  The skin margins were undermined to an appropriate distance in all directions utilizing iris scissors. Dorsal Nasal Flap Text: The defect edges were debeveled with a #15 scalpel blade.  Given the location of the defect and the proximity to free margins a dorsal nasal flap was deemed most appropriate.  Using a sterile surgical marker, an appropriate dorsal nasal flap was drawn around the defect.    The area thus outlined was incised deep to adipose tissue with a #15 scalpel blade.  The skin margins were undermined to an appropriate distance in all directions utilizing iris scissors. O-Z Flap Text: The defect edges were debeveled with a #15 scalpel blade.  Given the location of the defect, shape of the defect and the proximity to free margins an O-Z flap was deemed most appropriate.  Using a sterile surgical marker, an appropriate transposition flap was drawn incorporating the defect and placing the expected incisions within the relaxed skin tension lines where possible. The area thus outlined was incised deep to adipose tissue with a #15 scalpel blade.  The skin margins were undermined to an appropriate distance in all directions utilizing iris scissors. Consent 3/Introductory Paragraph: I gave the patient a chance to ask questions they had about the procedure.  Following this I explained the Mohs procedure and consent was obtained. The risks, benefits and alternatives to therapy were discussed in detail. Specifically, the risks of infection, scarring, bleeding, prolonged wound healing, incomplete removal, allergy to anesthesia, nerve injury and recurrence were addressed. Prior to the procedure, the treatment site was clearly identified and confirmed by the patient. All components of Universal Protocol/PAUSE Rule completed. Area H Indication Text: Tumors in this location are included in Area H (eyelids, eyebrows, nose, lips, chin, ear, pre-auricular, post-auricular, temple, genitalia, hands, feet, ankles and areola).  Tissue conservation is critical in these anatomic locations. Otolaryngologist Procedure Text (F): After obtaining clear surgical margins the patient was sent to otolaryngology for surgical repair.  The patient understands they will receive post-surgical care and follow-up from the referring physician's office. Stage 9: Additional Anesthesia Type: 1% lidocaine with epinephrine Primary Defect Length In Cm (Final Defect Size - Required For Flaps/Grafts): 1.4 Alar Island Pedicle Flap Text: The defect edges were debeveled with a #15 scalpel blade.  Given the location of the defect, shape of the defect and the proximity to the alar rim an island pedicle advancement flap was deemed most appropriate.  Using a sterile surgical marker, an appropriate advancement flap was drawn incorporating the defect, outlining the appropriate donor tissue and placing the expected incisions within the nasal ala running parallel to the alar rim. The area thus outlined was incised with a #15 scalpel blade.  The skin margins were undermined minimally to an appropriate distance in all directions around the primary defect and laterally outward around the island pedicle utilizing iris scissors.  There was minimal undermining beneath the pedicle flap. Localized Dermabrasion With 15 Blade Text: The patient was draped in routine manner.  Localized dermabrasion using a 15 blade was performed in routine manner to papillary dermis. This spot dermabrasion is being performed to complete skin cancer reconstruction. It also will eliminate the other sun damaged precancerous cells that are known to be part of the regional effect of a lifetime's worth of sun exposure. This localized dermabrasion is therapeutic and should not be considered cosmetic in any regard. Distance Of Undermining In Cm (Required): 1.5 Mixed Superficial And Nodular Bcc Histology Text: Arising from the epidermis and superficial hair follicles are small, superficial, multicentric buds of basaloid keratinocytes. The cells have scant cytoplasm and round dark nuclei. Mitotic figures and apoptotic bodies are evident. The nuclei at the periphery of the islands have a palisaded arrangement. The islands are associated with a fibromyxoid stroma and there is cleft formation between some of the islands and stroma.  Emanating from the epidermis and infiltrating the dermis are irregularly shaped islands of basaloid keratinocytes. The cells have scant cytoplasm and round dark nuclei. Mitotic figures and apoptotic bodies are evident. The nuclei at the periphery of the islands have a palisaded arrangement. The islands are associated with a fibromyxoid stroma and there is a cleft formation between some of the islands and stroma. Bcc Histology Text: There were numerous aggregates of basaloid cells. Double Island Pedicle Flap Text: The defect edges were debeveled with a #15 scalpel blade.  Given the location of the defect, shape of the defect and the proximity to free margins a double island pedicle advancement flap was deemed most appropriate.  Using a sterile surgical marker, an appropriate advancement flap was drawn incorporating the defect, outlining the appropriate donor tissue and placing the expected incisions within the relaxed skin tension lines where possible.    The area thus outlined was incised deep to adipose tissue with a #15 scalpel blade.  The skin margins were undermined to an appropriate distance in all directions around the primary defect and laterally outward around the island pedicle utilizing iris scissors.  There was minimal undermining beneath the pedicle flap. Same Histology In Subsequent Stages Text: The pattern and morphology of the tumor is as described in the first stage. O-Z Plasty Text: The defect edges were debeveled with a #15 scalpel blade.  Given the location of the defect, shape of the defect and the proximity to free margins an O-Z plasty (double transposition flap) was deemed most appropriate.  Using a sterile surgical marker, the appropriate transposition flaps were drawn incorporating the defect and placing the expected incisions within the relaxed skin tension lines where possible.    The area thus outlined was incised deep to adipose tissue with a #15 scalpel blade.  The skin margins were undermined to an appropriate distance in all directions utilizing iris scissors.  Hemostasis was achieved with electrocautery.  The flaps were then transposed into place, one clockwise and the other counterclockwise, and anchored with interrupted buried subcutaneous sutures. Consent (Temporal Branch)/Introductory Paragraph: The rationale for Mohs was explained to the patient and consent was obtained. The risks, benefits and alternatives to therapy were discussed in detail. Specifically, the risks of damage to the temporal branch of the facial nerve, infection, scarring, bleeding, prolonged wound healing, incomplete removal, allergy to anesthesia, and recurrence were addressed. Prior to the procedure, the treatment site was clearly identified and confirmed by the patient. All components of Universal Protocol/PAUSE Rule completed. Mercedes Flap Text: The defect edges were debeveled with a #15 scalpel blade.  Given the location of the defect, shape of the defect and the proximity to free margins a Mercedes flap was deemed most appropriate.  Using a sterile surgical marker, an appropriate advancement flap was drawn incorporating the defect and placing the expected incisions within the relaxed skin tension lines where possible. The area thus outlined was incised deep to adipose tissue with a #15 scalpel blade.  The skin margins were undermined to an appropriate distance in all directions utilizing iris scissors. Area M Indication Text: Tumors in this location are included in Area M (cheek, forehead, scalp, neck, jawline and pretibial skin).  Mohs surgery is indicated for tumors in these anatomic locations. Tissue Cultured Epidermal Autograft Text: The defect edges were debeveled with a #15 scalpel blade.  Given the location of the defect, shape of the defect and the proximity to free margins a tissue cultured epidermal autograft was deemed most appropriate.  The graft was then trimmed to fit the size of the defect.  The graft was then placed in the primary defect and oriented appropriately. Mixed Nodular And Infiltrative Bcc Histology Text: Emanating from the epidermis and infiltrating the dermis are irregularly shaped islands of basaloid keratinocytes. The cells have scant cytoplasm and round dark nuclei. Mitotic figures and apoptotic bodies are evident. The nuclei at the periphery of the islands have a palisaded arrangement. The islands are associated with a fibromyxoid stroma and there is a cleft formation between some of the islands and stroma.  Irregularly shaped cords and strands of basaloid keratinocytes infiltrate the dermis with a spiky growth pattern. The cells have scant cytoplasm and round dark nuclei. Mitotic figures and apoptotic bodies are evident. The nuclei at the periphery of the islands have a palisaded arrangement. The islands are associated with a fibromyxoid stroma and there is a cleft formation between some of the islands and stroma. Cheiloplasty (Complex) Text: A decision was made to reconstruct the defect with a  cheiloplasty.  The defect was undermined extensively.  Additional obicularis oris muscle was excised with a 15 blade scalpel.  The defect was converted into a full thickness wedge to facilite a better cosmetic result.  Small vessels were then tied off with 5-0 monocyrl. The obicularis oris, superficial fascia, adipose and dermis were then reapproximated.  After the deeper layers were approximated the epidermis was reapproximated with particular care given to realign the vermillion border. Post-Care Instructions: I reviewed with the patient in detail post-care instructions. Patient is not to engage in any heavy lifting, exercise, or swimming for the next 14 days. Should the patient develop any fevers, chills, bleeding, severe pain patient will contact the office immediately. Zygomaticofacial Flap Text: Given the location of the defect, shape of the defect and the proximity to free margins a zygomaticofacial flap was deemed most appropriate for repair.  Using a sterile surgical marker, the appropriate flap was drawn incorporating the defect and placing the expected incisions within the relaxed skin tension lines where possible. The area thus outlined was incised deep to adipose tissue with a #15 scalpel blade with preservation of a vascular pedicle.  The skin margins were undermined to an appropriate distance in all directions utilizing iris scissors.  The flap was then placed into the defect and anchored with interrupted buried subcutaneous sutures. Intermediate Repair Preamble Text (Leave Blank If You Do Not Want): Undermining was performed with blunt dissection. Eyelid Full Thickness Repair - 26150: The eyelid defect was full thickness which required a wedge repair of the eyelid. Special care was taken to ensure that the eyelid margin was realligned when placing sutures. Burow's Graft Text: The defect edges were debeveled with a #15 scalpel blade.  Given the location of the defect, shape of the defect, the proximity to free margins and the presence of a standing cone deformity a Burow's skin graft was deemed most appropriate. The standing cone was removed and this tissue was then trimmed to the shape of the primary defect. The adipose tissue was also removed until only dermis and epidermis were left.  The skin margins of the secondary defect were undermined to an appropriate distance in all directions utilizing iris scissors.  The secondary defect was closed with interrupted buried subcutaneous sutures.  The skin edges were then re-apposed with running  sutures.  The skin graft was then placed in the primary defect and oriented appropriately. Provider Procedure Text (B): After obtaining clear surgical margins the defect was repaired by another provider. Cheek-To-Nose Interpolation Flap Text: A decision was made to reconstruct the defect utilizing an interpolation axial flap and a staged reconstruction.  A telfa template was made of the defect.  This telfa template was then used to outline the Cheek-To-Nose Interpolation flap.  The donor area for the pedicle flap was then injected with anesthesia.  The flap was excised through the skin and subcutaneous tissue down to the layer of the underlying musculature.  The interpolation flap was carefully excised within this deep plane to maintain its blood supply.  The edges of the donor site were undermined.   The donor site was closed in a primary fashion.  The pedicle was then rotated into position and sutured.  Once the tube was sutured into place, adequate blood supply was confirmed with blanching and refill.  The pedicle was then wrapped with xeroform gauze and dressed appropriately with a telfa and gauze bandage to ensure continued blood supply and protect the attached pedicle. Purse String (Intermediate) Text: Given the location of the defect and the characteristics of the surrounding skin a pursestring intermediate closure was deemed most appropriate.  Undermining was performed circumfirentially around the surgical defect.  A purstring suture was then placed and tightened. Mohs Method Verbiage: An incision at a 45 degree angle following the standard Mohs approach was done and the specimen was harvested as a microscopic controlled layer. Tumor Depth: Less than 6mm from granular layer and no invasion beyond the subcutaneous fat Graft Donor Site Bandage (Optional-Leave Blank If You Don't Want In Note): A pressure bandage was applied to the donor site. Bilateral Rotation Flap Text: The defect edges were debeveled with a #10 scalpel blade. Given the location of the defect, shape of the defect and the proximity to free margins a bilateral rotation flap was deemed most appropriate. Using a sterile surgical marker, an appropriate rotation flap was drawn incorporating the defect and placing the expected incisions within the relaxed skin tension lines where possible. The area thus outlined was incised deep to adipose tissue with a #10 scalpel blade. The skin margins were undermined to an appropriate distance in all directions utilizing iris scissors. Following this, the designed flap was carried over into the primary defect and sutured into place. Repair Hemostasis (Optional): Electrocautery (pulsed) Rhomboid Transposition Flap Text: The defect edges were debeveled with a #10 scalpel blade.  Given the location of the defect and the proximity to free margins a rhomboid transposition flap was deemed most appropriate.  Using a sterile surgical marker, an appropriate rhomboid flap was drawn incorporating the defect.    The area thus outlined was incised deep to adipose tissue with a #15 scalpel blade.  The skin margins were undermined to an appropriate distance in all directions utilizing iris scissors. Mart-1 - Negative Histology Text: MART-1 staining demonstrates a normal density and pattern of melanocytes along the dermal-epidermal junction. The surgical margins are negative for tumor cells. Consent (Ear)/Introductory Paragraph: The rationale for Mohs was explained to the patient and consent was obtained. The risks, benefits and alternatives to therapy were discussed in detail. Specifically, the risks of ear deformity, infection, scarring, bleeding, prolonged wound healing, incomplete removal, allergy to anesthesia, nerve injury and recurrence were addressed. Prior to the procedure, the treatment site was clearly identified and confirmed by the patient. All components of Universal Protocol/PAUSE Rule completed. Mustarde Flap Text: The defect edges were debeveled with a #15 scalpel blade.  Given the size, depth and location of the defect and the proximity to free margins a Mustarde flap was deemed most appropriate.  Using a sterile surgical marker, an appropriate flap was drawn incorporating the defect. The area thus outlined was incised with a #15 scalpel blade.  The skin margins were undermined to an appropriate distance in all directions utilizing iris scissors. Ear Star Wedge Flap Text: The defect edges were debeveled with a #15 blade scalpel.  Given the location of the defect and the proximity to free margins (helical rim) an ear star wedge flap was deemed most appropriate.  Using a sterile surgical marker, the appropriate flap was drawn incorporating the defect and placing the expected incisions between the helical rim and antihelix where possible.  The area thus outlined was incised through and through with a #15 scalpel blade. Bcc Keratotic Histology Text: A granular cell layer is present with cysts that show central calcification surrounded by the basaloid tumor cells. Lentigo Maligna Histology Text: There is an intraepidermal melanocytic proliferation arranged as nests as well as individual cells. The individual cells are crowded along the dermal-epidermal junction with well-developed confluent growth. Pagetoid migration to the upper levels of the epidermis is noted. The individual melanocytes are enlarged and hyperchromatic with irregular nuclear contours and coarse chromatin. Within the dermis there are rare nests of similar appearing melanocytes that are associated with fibroplasia, mild chronic inflammation, and solar elastosis. Scc Histology Text: Arising from the epidermis is a keratin-producing proliferation of atypical keratinocytes with invasion into the underlying dermis. Mitoses and atypical forms are evident. Intercellular bridge and keratin carlos formation are evident. Cartilage Graft Text: The defect edges were debeveled with a #15 scalpel blade.  Given the location of the defect, shape of the defect, the fact the defect involved a full thickness cartilage defect a cartilage graft was deemed most appropriate.  An appropriate donor site was identified, cleansed, and anesthetized. The cartilage graft was then harvested and transferred to the recipient site, oriented appropriately and then sutured into place.  The secondary defect was then repaired using a primary closure. Where Do You Want The Question To Include Opioid Counseling Located?: Case Summary Tab Estlander Flap (Lower To Upper Lip) Text: The defect of the lower lip was assessed and measured.  Given the location and size of the defect, an Estlander flap was deemed most appropriate.  Using a sterile surgical marker, an appropriate Estlander flap was measured and drawn on the upper lip. Local anesthesia was then infiltrated. A scalpel was then used to incise the lateral aspect of the flap, through skin, muscle and mucosa, leaving the flap pedicled medially.  The flap was then rotated and positioned to fill the lower lip defect.  The flap was then sutured into place with a three layer technique, closing the orbicularis oris muscle layer with subcutaneous buried sutures, followed by a mucosal layer and an epidermal layer. Surgeon/Pathologist Verbiage (Will Incorporate Name Of Surgeon From Intro If Not Blank): operated in two distinct and integrated capacities as the surgeon and pathologist. Bilobed Flap Text: The defect edges were debeveled with a #15 scalpel blade.  Given the location of the defect and the proximity to free margins a bilobe flap was deemed most appropriate.  Using a sterile surgical marker, an appropriate bilobe flap drawn around the defect.    The area thus outlined was incised deep to adipose tissue with a #15 scalpel blade.  The skin margins were undermined to an appropriate distance in all directions utilizing iris scissors. Anesthesia Volume In Cc: 3 Partial Purse String (Simple) Text: Given the location of the defect and the characteristics of the surrounding skin a simple purse string closure was deemed most appropriate.  Undermining was performed circumfirentially around the surgical defect.  A purse string suture was then placed and tightened. Wound tension only allowed a partial closure of the circular defect. Nasal Turnover Hinge Flap Text: The defect edges were debeveled with a #15 scalpel blade.  Given the size, depth, location of the defect and the defect being full thickness a nasal turnover hinge flap was deemed most appropriate.  Using a sterile surgical marker, an appropriate hinge flap was drawn incorporating the defect. The area thus outlined was incised with a #15 scalpel blade. The flap was designed to recreate the nasal mucosal lining and the alar rim. The skin margins were undermined to an appropriate distance in all directions utilizing iris scissors. Flip-Flop Flap Text: The defect edges were debeveled with a #15 blade scalpel.  Given the location of the defect and the proximity to free margins a flip-flop flap was deemed most appropriate. Using a sterile surgical marker, the appropriate flap was drawn incorporating the defect and placing the expected incisions between the helical rim and antihelix where possible.  The area thus outlined was incised through and through with a #15 scalpel blade. Following this, the designed flap was carried over into the primary defect and sutured into place. Rotation Flap Text: The defect edges were debeveled with a #10 scalpel blade.  Given the location of the defect, shape of the defect and the proximity to free margins a rotation flap was deemed most appropriate.  Using a sterile surgical marker, an appropriate rotation flap was drawn incorporating the defect and placing the expected incisions within the relaxed skin tension lines where possible.    The area thus outlined was incised deep to adipose tissue with a #10 scalpel blade.  The skin margins were undermined to an appropriate distance in all directions utilizing iris scissors. Consent (Nose)/Introductory Paragraph: The rationale for Mohs was explained to the patient and consent was obtained. The risks, benefits and alternatives to therapy were discussed in detail. Specifically, the risks of nasal deformity, changes in the flow of air through the nose, infection, scarring, bleeding, prolonged wound healing, incomplete removal, allergy to anesthesia, nerve injury and recurrence were addressed. Prior to the procedure, the treatment site was clearly identified and confirmed by the patient. All components of Universal Protocol/PAUSE Rule completed. Rhombic Flap Text: The defect edges were debeveled with a #15 scalpel blade.  Given the location of the defect and the proximity to free margins a rhombic flap was deemed most appropriate.  Using a sterile surgical marker, an appropriate rhombic flap was drawn incorporating the defect.    The area thus outlined was incised deep to adipose tissue with a #15 scalpel blade.  The skin margins were undermined to an appropriate distance in all directions utilizing iris scissors. Consent (Near Eyelid Margin)/Introductory Paragraph: The rationale for Mohs was explained to the patient and consent was obtained. The risks, benefits and alternatives to therapy were discussed in detail. Specifically, the risks of ectropion or eyelid deformity, infection, scarring, bleeding, prolonged wound healing, incomplete removal, allergy to anesthesia, nerve injury and recurrence were addressed. Prior to the procedure, the treatment site was clearly identified and confirmed by the patient. All components of Universal Protocol/PAUSE Rule completed. Muscle Hinge Flap Text: The defect edges were debeveled with a #15 scalpel blade.  Given the size, depth and location of the defect and the proximity to free margins a muscle hinge flap was deemed most appropriate.  Using a sterile surgical marker, an appropriate hinge flap was drawn incorporating the defect. The area thus outlined was incised with a #15 scalpel blade.  The skin margins were undermined to an appropriate distance in all directions utilizing iris scissors. Double Z Plasty Text: The lesion was extirpated to the level of the fat with a #15 scalpel blade. Given the location of the defect, shape of the defect and the proximity to free margins a double Z-plasty was deemed most appropriate for repair. Using a sterile surgical marker, the appropriate transposition arms of the double Z-plasty were drawn incorporating the defect and placing the expected incisions within the relaxed skin tension lines where possible. The area thus outlined was incised deep to adipose tissue with a #15 scalpel blade. The skin margins were undermined to an appropriate distance in all directions utilizing iris scissors. The opposing transposition arms were then transposed and carried over into place in opposite direction and anchored with interrupted buried subcutaneous sutures. Abbe Flap (Upper To Lower Lip) Text: The defect of the lower lip was assessed and measured.  Given the location and size of the defect, an Abbe flap was deemed most appropriate.  Using a sterile surgical marker, an appropriate Abbe flap was measured and drawn on the upper lip. Local anesthesia was then infiltrated.  A scalpel was then used to incise the upper lip through and through the skin, vermilion, muscle and mucosa, leaving the flap pedicled on the opposite side.  The flap was then rotated and transferred to the lower lip defect.  The flap was then sutured into place with a three layer technique, closing the orbicularis oris muscle layer with subcutaneous buried sutures, followed by a mucosal layer and an epidermal layer. Scc In Situ Histology Text: Within the epidermis is a full-thickness proliferation of atypical keratinocytes with mitoses. The overlying stratum corneum demonstrates parakeratosis. No invasion is noted. Complex Repair And Flap Additional Text (Will Appearing After The Standard Complex Repair Text): The complex repair was not sufficient to completely close the primary defect. The remaining additional defect was repaired with the flap mentioned below. Full Thickness Lip Wedge Repair (Flap) Text: Given the location of the defect and the proximity to free margins a full thickness wedge repair was deemed most appropriate.  Using a sterile surgical marker, the appropriate repair was drawn incorporating the defect and placing the expected incisions perpendicular to the vermillion border.  The vermillion border was also meticulously outlined to ensure appropriate reapproximation during the repair.  The area thus outlined was incised through and through with a #15 scalpel blade.  The muscularis and dermis were reaproximated with deep sutures following hemostasis. Care was taken to realign the vermillion border before proceeding with the superficial closure.  Once the vermillion was realigned the superfical and mucosal closure was finished. Referring Physician (Optional): Dr. Alvarenga Localized Dermabrasion With Wire Brush Text: The patient was draped in routine manner.  Localized dermabrasion using 3 x 17 mm wire brush was performed in routine manner to papillary dermis. This spot dermabrasion is being performed to complete skin cancer reconstruction. It also will eliminate the other sun damaged precancerous cells that are known to be part of the regional effect of a lifetime's worth of sun exposure. This localized dermabrasion is therapeutic and should not be considered cosmetic in any regard. Debridement Text: The wound edges were debrided prior to proceeding with the closure to facilitate wound healing. Location Indication Override (Is Already Calculated Based On Selected Body Location): Area L Banner Transposition Flap Text: The defect edges were debeveled with a #15 scalpel blade.  Given the location of the defect and the proximity to free margins a Banner transposition flap was deemed most appropriate.  Using a sterile surgical marker, an appropriate flap drawn around the defect. The area thus outlined was incised deep to adipose tissue with a #15 scalpel blade.  The skin margins were undermined to an appropriate distance in all directions utilizing iris scissors. Stage 1: Number Of Blocks?: 4 Scc In Situ With Follicular Extension Histology Text: Within the epidermis is a full thickness proliferation of atypical keratinocytes with mitoses. The overlying stratum corneum demonstrates parakeratosis. No invasion is noted. The atypical cells extend down hair follicle structures. Complex Repair And Graft Additional Text (Will Appearing After The Standard Complex Repair Text): The complex repair was not sufficient to completely close the primary defect. The remaining additional defect was repaired with the graft mentioned below. Postop Diagnosis: same Cheek Interpolation Flap Text: A decision was made to reconstruct the defect utilizing an interpolation axial flap and a staged reconstruction.  A telfa template was made of the defect.  This telfa template was then used to outline the Cheek Interpolation flap.  The donor area for the pedicle flap was then injected with anesthesia.  The flap was excised through the skin and subcutaneous tissue down to the layer of the underlying musculature.  The interpolation flap was carefully excised within this deep plane to maintain its blood supply.  The edges of the donor site were undermined.   The donor site was closed in a primary fashion.  The pedicle was then rotated into position and sutured.  Once the tube was sutured into place, adequate blood supply was confirmed with blanching and refill.  The pedicle was then wrapped with xeroform gauze and dressed appropriately with a telfa and gauze bandage to ensure continued blood supply and protect the attached pedicle. Purse String (Simple) Text: Given the location of the defect and the characteristics of the surrounding skin a pursestring closure was deemed most appropriate.  Undermining was performed circumfirentially around the surgical defect.  A purstring suture was then placed and tightened. Consent Type: Consent 1 (Standard) Anesthesia Volume In Cc: 6 Dressing: pressure dressing with telfa Eye Protection Verbiage: Before proceeding with the stage, a plastic scleral shield was inserted. The globe was anesthetized with a few drops of 1% lidocaine with 1:100,000 epinephrine. Then, an appropriate sized scleral shield was chosen and coated with lacrilube ointment. The shield was gently inserted and left in place for the duration of each stage. After the stage was completed, the shield was gently removed. Bilateral Helical Rim Advancement Flap Text: The defect edges were debeveled with a #10 blade scalpel.  Given the location of the defect and the proximity to free margins (helical rim) a bilateral helical rim advancement flap was deemed most appropriate.  Using a sterile surgical marker, the appropriate advancement flaps were drawn incorporating the defect and placing the expected incisions between the helical rim and antihelix where possible.  The area thus outlined was incised through and through with a #10 scalpel blade.  With a skin hook and iris scissors, the flaps were gently and sharply undermined and freed up. Burow's Advancement Flap Text: The defect edges were debeveled with a #10 scalpel blade.  Given the location of the defect and the proximity to free margins a Burow's advancement flap was deemed most appropriate.  Using a sterile surgical marker, the appropriate advancement flap was drawn incorporating the defect and placing the expected incisions within the relaxed skin tension lines where possible.    The area thus outlined was incised deep to adipose tissue with a #10 scalpel blade.  The skin margins were undermined to an appropriate distance in all directions utilizing iris scissors. Bcc  Nodular Histology Text: Emanating from the epidermis and infiltrating the dermis are irregularly shaped islands of basaloid keratinocytes. The cells have scant cytoplasm and round dark nuclei. Mitotic figures and apoptotic bodies are evident. The nuclei at the periphery of the islands have a palisaded arrangement. The islands are associated with a fibromyxoid stroma and there is a cleft formation between some of the islands and stroma. Star Wedge Flap Text: The defect edges were debeveled with a #15 scalpel blade.  Given the location of the defect, shape of the defect and the proximity to free margins a star wedge flap was deemed most appropriate.  Using a sterile surgical marker, an appropriate rotation flap was drawn incorporating the defect and placing the expected incisions within the relaxed skin tension lines where possible. The area thus outlined was incised deep to adipose tissue with a #15 scalpel blade.  The skin margins were undermined to an appropriate distance in all directions utilizing iris scissors. Patient Name (Optional- Will Render 'the Patient' If Blank): Jesu Watters Detail Level: Detailed Nasolabial Transposition Flap Text: The defect edges were debeveled with a #15 scalpel blade.  Given the size, depth and location of the defect and the proximity to free margins a nasolabial transposition flap was deemed most appropriate. Using a sterile surgical marker, an appropriate flap was drawn incorporating the defect. The area thus outlined was incised with a #15 scalpel blade. The skin margins were undermined to an appropriate distance in all directions utilizing iris scissors. Following this, the designed flap was carried into the primary defect and sutured into place. H Plasty Text: Given the location of the defect, shape of the defect and the proximity to free margins a H-plasty was deemed most appropriate for repair.  Using a sterile surgical marker, the appropriate advancement arms of the H-plasty were drawn incorporating the defect and placing the expected incisions within the relaxed skin tension lines where possible. The area thus outlined was incised deep to adipose tissue with a #15 scalpel blade. The skin margins were undermined to an appropriate distance in all directions utilizing iris scissors.  The opposing advancement arms were then advanced into place in opposite direction and anchored with interrupted buried subcutaneous sutures. Wound Care (No Sutures): Petrolatum Epidermal Autograft Text: The defect edges were debeveled with a #15 scalpel blade.  Given the location of the defect, shape of the defect and the proximity to free margins an epidermal autograft was deemed most appropriate.  Using a sterile surgical marker, the primary defect shape was transferred to the donor site. The epidermal graft was then harvested.  The skin graft was then placed in the primary defect and oriented appropriately. Interpolation Flap Text: A decision was made to reconstruct the defect utilizing an interpolation axial flap and a staged reconstruction.  A telfa template was made of the defect.  This telfa template was then used to outline the interpolation flap.  The donor area for the pedicle flap was then injected with anesthesia.  The flap was excised through the skin and subcutaneous tissue down to the layer of the underlying musculature.  The interpolation flap was carefully excised within this deep plane to maintain its blood supply.  The edges of the donor site were undermined.   The donor site was closed in a primary fashion.  The pedicle was then rotated into position and sutured.  Once the tube was sutured into place, adequate blood supply was confirmed with blanching and refill.  The pedicle was then wrapped with xeroform gauze and dressed appropriately with a telfa and gauze bandage to ensure continued blood supply and protect the attached pedicle. Unna Boot Text: An Unna boot was placed to help immobilize the limb and facilitate more rapid healing. Scc Moderately Differentiated Histology Text: Arising from the epidermis is a proliferation of atypical keratinocytes with mitoses. Invasion into the dermis is noted. In areas the tumor is keratin producing and in other areas the tumor is less well differentiated. Nostril Rim Text: The closure involved the nostril rim. Suturegard Body: The suture ends were repeatedly re-tightened and re-clamped to achieve the desired tissue expansion. Transposition Flap Text: The defect edges were debeveled with a #10 scalpel blade.  Given the location of the defect and the proximity to free margins a superior based transposition flap was deemed most appropriate.  Using a sterile surgical marker, an appropriate superior based transposition flap was drawn incorporating the defect.    The area thus outlined was incised deep to adipose tissue with a #10 scalpel blade.  The skin margins were undermined to an appropriate distance in all directions utilizing iris scissors. Mohs Case Number:  Orbicularis Oris Muscle Flap Text: The defect edges were debeveled with a #15 scalpel blade.  Given that the defect affected the competency of the oral sphincter an obicularis oris muscle flap was deemed most appropriate to restore this competency and normal muscle function.  Using a sterile surgical marker, an appropriate flap was drawn incorporating the defect. The area thus outlined was incised with a #15 scalpel blade. Which Instrument Did You Use For Dermabrasion?: Wire Brush Bcc  Nodulocystic Histology Text: Nests of basaloid cells with peripheral palisading associated with a fibromyxoid stroma. Closure 2 Information: This tab is for additional flaps and grafts, including complex repair and grafts and complex repair and flaps. You can also specify a different location for the additional defect, if the location is the same you do not need to select a new one. We will insert the automated text for the repair you select below just as we do for solitary flaps and grafts. Please note that at this time if you select a location with a different insurance zone you will need to override the ICD10 and CPT if appropriate. Medical Necessity Statement: Based on my medical judgement, Mohs surgery is the most appropriate treatment for this cancer compared to other treatments. Island Pedicle Flap Text: The defect edges were debeveled with a #10 scalpel blade.  Given the location of the defect, shape of the defect and the proximity to free margins an island pedicle advancement flap was deemed most appropriate.  Using a sterile surgical marker, an appropriate advancement flap was drawn incorporating the defect, outlining the appropriate donor tissue and placing the expected incisions within the relaxed skin tension lines where possible.    The area thus outlined was incised deep to adipose tissue with a #10 scalpel blade.  The skin margins were undermined to an appropriate distance in all directions around the primary defect and laterally outward around the island pedicle utilizing iris scissors.  There was minimal undermining beneath the pedicle flap. Melolabial Interpolation Flap Text: A decision was made to reconstruct the defect utilizing an interpolation axial flap and a staged reconstruction.  A telfa template was made of the defect.  This telfa template was then used to outline the melolabial interpolation flap.  The donor area for the pedicle flap was then injected with anesthesia.  The flap was excised through the skin and subcutaneous tissue down to the layer of the underlying musculature.  The pedicle flap was carefully excised within this deep plane to maintain its blood supply.  The edges of the donor site were undermined.   The donor site was closed in a primary fashion.  The pedicle was then rotated into position and sutured.  Once the tube was sutured into place, adequate blood supply was confirmed with blanching and refill.  The pedicle was then wrapped with xeroform gauze and dressed appropriately with a telfa and gauze bandage to ensure continued blood supply and protect the attached pedicle. Eyelid Partial Thickness Repair - 03136: The eyelid defect was partial thickness which required a wedge repair of the eyelid. Special care was taken to ensure that the eyelid margin was realligned when placing sutures. Scc Poorly Differentiated Histology Text: Arising from the epidermis is a pleomorphic proliferation of atypical keratinocytes with mitoses. Invasion into the dermis is noted. Dermal Autograft Text: The defect edges were debeveled with a #15 scalpel blade.  Given the location of the defect, shape of the defect and the proximity to free margins a dermal autograft was deemed most appropriate.  Using a sterile surgical marker, the primary defect shape was transferred to the donor site. The area thus outlined was incised deep to adipose tissue with a #15 scalpel blade.  The harvested graft was then trimmed of adipose and epidermal tissue until only dermis was left.  The skin graft was then placed in the primary defect and oriented appropriately. Eccrine Carcinoma Histology Text: Sections of eccrine carcinoma show a tumor composed of basaloid cells infiltrating the dermis. The tumor forms tubules and glands which infiltrate a sclerotic dermis. Eccrine differentiation is readily seen at high power. Manual Repair Warning Statement: We plan on removing the manually selected variable below in favor of our much easier automatic structured text blocks found in the previous tab. We decided to do this to help make the flow better and give you the full power of structured data. Manual selection is never going to be ideal in our platform and I would encourage you to avoid using manual selection from this point on, especially since I will be sunsetting this feature. It is important that you do one of two things with the customized text below. First, you can save all of the text in a word file so you can have it for future reference. Second, transfer the text to the appropriate area in the Library tab. Lastly, if there is a flap or graft type which we do not have you need to let us know right away so I can add it in before the variable is hidden. No need to panic, we plan to give you roughly 6 months to make the change. Information: Selecting Yes will display possible errors in your note based on the variables you have selected. This validation is only offered as a suggestion for you. PLEASE NOTE THAT THE VALIDATION TEXT WILL BE REMOVED WHEN YOU FINALIZE YOUR NOTE. IF YOU WANT TO FAX A PRELIMINARY NOTE YOU WILL NEED TO TOGGLE THIS TO 'NO' IF YOU DO NOT WANT IT IN YOUR FAXED NOTE. Mohs Histo Method Verbiage: The tissue was placed in petri dishes with epidermis superior to achieve precise orientation. Horizontal sectioning of the base and contiguous peripheral margins was then carried out and the prepared microscopic sections were examined by Dr. Maurer. Bilobed Transposition Flap Text: The defect edges were debeveled with a #15 scalpel blade.  Given the location of the defect and the proximity to free margins a bilobed transposition flap was deemed most appropriate.  Using a sterile surgical marker, an appropriate bilobe flap drawn around the defect.    The area thus outlined was incised deep to adipose tissue with a #15 scalpel blade.  The skin margins were undermined to an appropriate distance in all directions utilizing iris scissors. Partial Purse String (Intermediate) Text: Given the location of the defect and the characteristics of the surrounding skin an intermediate purse string closure was deemed most appropriate.  Undermining was performed circumfirentially around the surgical defect.  A purse string suture was then placed and tightened. Wound tension only allowed a partial closure of the circular defect. Bcc Micronodular Histology Text: Emanating from the epidermis and infiltrating the dermis are irregularly shaped islands of basaloid keratinocytes. The cells have scant cytoplasm and dark round nuclei. Mitotic figures and apoptotic bodies are evident. The nuclei at the periphery of the islands have a palisaded arrangement. The islands are associated with a fibromyxoid stroma and there is cleft formation between some of the islands and stroma. In areas the tumor breaks up into a small, micronodular, infiltrative growth pattern with deeper extension into the dermis. Hatchet Flap Text: The defect edges were debeveled with a #10 scalpel blade.  Given the location of the defect, shape of the defect and the proximity to free margins a hatchet flap was deemed most appropriate.  Using a sterile surgical marker, an appropriate hatchet flap was drawn incorporating the defect and placing the expected incisions within the relaxed skin tension lines where possible.    The area thus outlined was incised deep to adipose tissue with a #10 scalpel blade.  The skin margins were undermined to an appropriate distance in all directions utilizing iris scissors. Spiral Flap Text: The defect edges were debeveled with a #15 scalpel blade.  Given the location of the defect, shape of the defect and the proximity to free margins a spiral flap was deemed most appropriate.  Using a sterile surgical marker, an appropriate rotation flap was drawn incorporating the defect and placing the expected incisions within the relaxed skin tension lines where possible. The area thus outlined was incised deep to adipose tissue with a #15 scalpel blade.  The skin margins were undermined to an appropriate distance in all directions utilizing iris scissors. Consent (Lip)/Introductory Paragraph: The rationale for Mohs was explained to the patient and consent was obtained. The risks, benefits and alternatives to therapy were discussed in detail. Specifically, the risks of lip deformity, changes in the oral aperture, infection, scarring, bleeding, prolonged wound healing, incomplete removal, allergy to anesthesia, nerve injury and recurrence were addressed. Prior to the procedure, the treatment site was clearly identified and confirmed by the patient. All components of Universal Protocol/PAUSE Rule completed. Nasalis-Muscle-Based Myocutaneous Island Pedicle Flap Text: Using a #15 blade, an incision was made around the donor flap to the level of the nasalis muscle. Wide lateral undermining was then performed in both the subcutaneous plane above the nasalis muscle, and in a submuscular plane just above periosteum. This allowed the formation of a free nasalis muscle axial pedicle (based on the angular artery) which was still attached to the actual cutaneous flap, increasing its mobility and vascular viability. Hemostasis was obtained with pinpoint electrocoagulation. The flap was mobilized into position and the pivotal anchor points positioned and stabilized with buried interrupted sutures. Subcutaneous and dermal tissues were closed in a multilayered fashion with sutures. Tissue redundancies were excised, and the epidermal edges were apposed without significant tension and sutured with sutures. Bill 59 Modifier?: No - Continue to Bill 79 Modifier Bi-Rhombic Flap Text: The defect edges were debeveled with a #15 scalpel blade.  Given the location of the defect and the proximity to free margins a bi-rhombic flap was deemed most appropriate.  Using a sterile surgical marker, an appropriate rhombic flap was drawn incorporating the defect. The area thus outlined was incised deep to adipose tissue with a #15 scalpel blade.  The skin margins were undermined to an appropriate distance in all directions utilizing iris scissors. Tarsorrhaphy Text: A tarsorrhaphy was performed using Frost sutures. Suturegard Intro: Intraoperative tissue expansion was performed, utilizing the SUTUREGARD device, in order to reduce wound tension. Epidermal Sutures: 4-0 Nylon Vermilion Border Text: The closure involved the vermilion border. Unique Flap 1 Name: Erasto Fernando Bcc  Morpheaform/Sclerosing Histology Text: Irregularly shaped cords and strands of basaloid keratinocytes are present within the dermis. In areas the cells assume a single cell strand formation with a highly-infiltrative growth pattern. The cells have scant cytoplasm and round dark nuclei. There are nodular aggregates of darkly staining basophilic cells exhibiting peripheral palisading and stromal retraction. The islands are associated with a dense morpheic stroma. Staged Advancement Flap Text: The defect edges were debeveled with a #15 scalpel blade.  Given the location of the defect, shape of the defect and the proximity to free margins a staged advancement flap was deemed most appropriate.  Using a sterile surgical marker, an appropriate advancement flap was drawn incorporating the defect and placing the expected incisions within the relaxed skin tension lines where possible. The area thus outlined was incised deep to adipose tissue with a #15 scalpel blade.  The skin margins were undermined to an appropriate distance in all directions utilizing iris scissors. Body Location Override (Optional - Billing Will Still Be Based On Selected Body Map Location If Applicable): Left ventral distal forearm Consent (Scalp)/Introductory Paragraph: The rationale for Mohs was explained to the patient and consent was obtained. The risks, benefits and alternatives to therapy were discussed in detail. Specifically, the risks of changes in hair growth pattern secondary to repair, infection, scarring, bleeding, prolonged wound healing, incomplete removal, allergy to anesthesia, nerve injury and recurrence were addressed. Prior to the procedure, the treatment site was clearly identified and confirmed by the patient. All components of Universal Protocol/PAUSE Rule completed. Nasalis Myocutaneous Flap Text: Using a #15 blade, an incision was made around the donor flap to the level of the nasalis muscle. Wide lateral undermining was then performed in both the subcutaneous plane above the nasalis muscle, and in a submuscular plane just above periosteum. This allowed the formation of a free nasalis muscle axial pedicle which was still attached to the actual cutaneous flap, increasing its mobility and vascular viability. Hemostasis was obtained with pinpoint electrocoagulation. The flap was mobilized into position and the pivotal anchor points positioned and stabilized with buried interrupted sutures. Subcutaneous and dermal tissues were closed in a multilayered fashion with sutures. Tissue redundancies were excised, and the epidermal edges were apposed without significant tension and sutured with sutures. Helical Rim Advancement Flap Text: The defect edges were debeveled with a #10 blade scalpel.  Given the location of the defect and the proximity to free margins (helical rim) a double helical rim advancement flap was deemed most appropriate.  Using a sterile surgical marker, the appropriate advancement flaps were drawn incorporating the defect and placing the expected incisions between the helical rim and antihelix where possible.  The area thus outlined was incised through and through with a #10 scalpel blade.  With a skin hook and iris scissors, the flaps were gently and sharply undermined and freed up. Retention Suture Bite Size: 3 mm Repair Type: Complex Repair Composite Graft Text: The defect edges were debeveled with a #15 scalpel blade.  Given the location of the defect, shape of the defect, the proximity to free margins and the fact the defect was full thickness a composite graft was deemed most appropriate.  The defect was outline and then transferred to the donor site.  A full thickness graft was then excised from the donor site. The graft was then placed in the primary defect, oriented appropriately and then sutured into place.  The secondary defect was then repaired using a primary closure. Helical Rim Text: The closure involved the helical rim. Intermediate Repair And Graft Additional Text (Will Appearing After The Standard Complex Repair Text): The intermediate repair was not sufficient to completely close the primary defect. The remaining additional defect was repaired with the graft mentioned below. Skin Substitute Text: The defect edges were debeveled with a #15 scalpel blade.  Given the location of the defect, shape of the defect and the proximity to free margins a skin substitute graft was deemed most appropriate.  The graft material was trimmed to fit the size of the defect. The graft was then placed in the primary defect and oriented appropriately. Posterior Auricular Interpolation Flap Text: A decision was made to reconstruct the defect utilizing an interpolation axial flap and a staged reconstruction.  A telfa template was made of the defect.  This telfa template was then used to outline the posterior auricular interpolation flap.  The donor area for the pedicle flap was then injected with anesthesia.  The flap was excised through the skin and subcutaneous tissue down to the layer of the underlying musculature.  The pedicle flap was carefully excised within this deep plane to maintain its blood supply.  The edges of the donor site were undermined.   The donor site was closed in a primary fashion.  The pedicle was then rotated into position and sutured.  Once the tube was sutured into place, adequate blood supply was confirmed with blanching and refill.  The pedicle was then wrapped with xeroform gauze and dressed appropriately with a telfa and gauze bandage to ensure continued blood supply and protect the attached pedicle. W Plasty Text: The lesion was extirpated to the level of the fat with a #10 scalpel blade.  Given the location of the defect, shape of the defect and the proximity to free margins a W-plasty was deemed most appropriate for repair.  Using a sterile surgical marker, the appropriate transposition arms of the W-plasty were drawn incorporating the defect and placing the expected incisions within the relaxed skin tension lines where possible.    The area thus outlined was incised deep to adipose tissue with a #15 scalpel blade.  The skin margins were undermined to an appropriate distance in all directions utilizing iris scissors.  The opposing transposition arms were then transposed into place in opposite direction and anchored with interrupted buried subcutaneous sutures. Sebaceous Carcinoma Histology Text: Basaloid-epithelioid cells and cells with evidence of sebaceous differentiation. Dressing (No Sutures): dry sterile dressing Secondary Intention Text (Leave Blank If You Do Not Want): The defect will heal with secondary intention. Hemigard Intro: Due to skin fragility and wound tension, it was decided to use HEMIGARD adhesive retention suture devices to permit a linear closure. The skin was cleaned and dried for a 6cm distance away from the wound. Excessive hair, if present, was removed to allow for adhesion. Pain Refusal Text: I offered to prescribe pain medication but the patient refused to take this medication. Advancement Flap (Single) Text: The defect edges were debeveled with a #10 scalpel blade.  Given the location of the defect and the proximity to free margins a single advancement flap was deemed most appropriate.  Using a sterile surgical marker, an appropriate advancement flap was drawn incorporating the defect and placing the expected incisions within the relaxed skin tension lines where possible.    The area thus outlined was incised deep to adipose tissue with a #10 scalpel blade.  The skin margins were undermined to an appropriate distance in all directions utilizing iris scissors. O-T Plasty Text: The defect edges were debeveled with a #10 scalpel blade.  Given the location of the defect, shape of the defect and the proximity to free margins an O-T plasty was deemed most appropriate.  Using a sterile surgical marker, an appropriate O-T plasty was drawn incorporating the defect and placing the expected incisions within the relaxed skin tension lines where possible.    The area thus outlined was incised deep to adipose tissue with a #15 scalpel blade.  The skin margins were undermined to an appropriate distance in all directions utilizing iris scissors. Consent 1/Introductory Paragraph: The rationale for Mohs was explained to the patient and consent was obtained. The risks, benefits and alternatives to therapy were discussed in detail. Specifically, the risks of infection, scarring, bleeding, prolonged wound healing, incomplete removal, allergy to anesthesia, nerve injury and recurrence were addressed. Prior to the procedure, the treatment site was clearly identified and confirmed by the patient. All components of Universal Protocol/PAUSE Rule completed. Island Pedicle Flap-Requiring Vessel Identification Text: The defect edges were debeveled with a #15 scalpel blade.  Given the location of the defect, shape of the defect and the proximity to free margins an island pedicle advancement flap was deemed most appropriate.  Using a sterile surgical marker, an appropriate advancement flap was drawn, based on the axial vessel mentioned above, incorporating the defect, outlining the appropriate donor tissue and placing the expected incisions within the relaxed skin tension lines where possible.    The area thus outlined was incised deep to adipose tissue with a #15 scalpel blade.  The skin margins were undermined to an appropriate distance in all directions around the primary defect and laterally outward around the island pedicle utilizing iris scissors.  There was minimal undermining beneath the pedicle flap. V-Y Flap Text: The defect edges were debeveled with a #15 scalpel blade.  Given the location of the defect, shape of the defect and the proximity to free margins a V-Y flap was deemed most appropriate.  Using a sterile surgical marker, an appropriate advancement flap was drawn incorporating the defect and placing the expected incisions within the relaxed skin tension lines where possible.    The area thus outlined was incised deep to adipose tissue with a #15 scalpel blade.  The skin margins were undermined to an appropriate distance in all directions utilizing iris scissors. Previous Accession (Optional): [id number] Scc Ka Subtype Histology Text: There is a symmetric, crateriform nodule with a central keratinous core. Buttress formation is noted at the peripheries of the nodule. The keratinocytes are enlarged with glassy, eosinophilic cytoplasm. The atypical keratinocytes infiltrate the dermis in an irregular fashion and are associated with inflammation including lymphomononuclear cells and eosinophils. Split-Thickness Skin Graft Text: The defect edges were debeveled with a #15 scalpel blade.  Given the location of the defect, shape of the defect and the proximity to free margins a split thickness skin graft was deemed most appropriate.  Using a sterile surgical marker, the primary defect shape was transferred to the donor site. The split thickness graft was then harvested.  The skin graft was then placed in the primary defect and oriented appropriately. Simple / Intermediate / Complex Repair - Final Wound Length In Cm: 3.3 Z Plasty Text: The lesion was extirpated to the level of the fat with a #15 scalpel blade.  Given the location of the defect, shape of the defect and the proximity to free margins a Z-plasty was deemed most appropriate for repair.  Using a sterile surgical marker, the appropriate transposition arms of the Z-plasty were drawn incorporating the defect and placing the expected incisions within the relaxed skin tension lines where possible.    The area thus outlined was incised deep to adipose tissue with a #15 scalpel blade.  The skin margins were undermined to an appropriate distance in all directions utilizing iris scissors.  The opposing transposition arms were then transposed into place in opposite direction and anchored with interrupted buried subcutaneous sutures. Repair Anesthesia Method: local infiltration Which Eyelid Repair Cpt Are You Using?: 24776 Mauc Instructions: By selecting yes to the question below the MAUC number will be added into the note.  This will be calculated automatically based on the diagnosis chosen, the size entered, the body zone selected (H,M,L) and the specific indications you chose. You will also have the option to override the Mohs AUC if you disagree with the automatically calculated number and this option is found in the Case Summary tab. Advancement Flap (Double) Text: The defect edges were debeveled with a #10 scalpel blade.  Given the location of the defect and the proximity to free margins a double advancement flap was deemed most appropriate.  Using a sterile surgical marker, the appropriate advancement flaps were drawn incorporating the defect and placing the expected incisions within the relaxed skin tension lines where possible.    The area thus outlined was incised deep to adipose tissue with a #10 scalpel blade.  The skin margins were undermined to an appropriate distance in all directions utilizing iris scissors. No Repair - Repaired With Adjacent Surgical Defect Text (Leave Blank If You Do Not Want): After obtaining clear surgical margins the defect was repaired concurrently with another surgical defect which was in close approximation. Bcc Superficial Histology Text: Arising from the epidermis and superficial hair follicles are small, superficial, multicentric buds of basaloid keratinocytes. The cells have scant cytoplasm and round dark nuclei. Mitotic figures and apoptotic bodies are evident. The nuclei at the periphery of the islands have a palisaded arrangement. The islands are associated with a fibromyxoid stroma and there is cleft formation between some of the islands and stroma. Consent 2/Introductory Paragraph: Mohs surgery was explained to the patient and consent was obtained. The risks, benefits and alternatives to therapy were discussed in detail. Specifically, the risks of infection, scarring, bleeding, prolonged wound healing, incomplete removal, allergy to anesthesia, nerve injury and recurrence were addressed. Prior to the procedure, the treatment site was clearly identified and confirmed by the patient. All components of Universal Protocol/PAUSE Rule completed. Keystone Flap Text: The defect edges were debeveled with a #15 scalpel blade.  Given the location of the defect, shape of the defect a keystone flap was deemed most appropriate.  Using a sterile surgical marker, an appropriate keystone flap was drawn incorporating the defect, outlining the appropriate donor tissue and placing the expected incisions within the relaxed skin tension lines where possible. The area thus outlined was incised deep to adipose tissue with a #15 scalpel blade.  The skin margins were undermined to an appropriate distance in all directions around the primary defect and laterally outward around the flap utilizing iris scissors. Staging Info: By selecting yes to the question above you will include information on AJCC 8 tumor staging in your Mohs note. Information on tumor staging will be automatically added for SCCs on the head and neck. AJCC 8 includes tumor size, tumor depth, perineural involvement and bone invasion. V-Y Plasty Text: The defect edges were debeveled with a #15 scalpel blade.  Given the location of the defect, shape of the defect and the proximity to free margins an V-Y advancement flap was deemed most appropriate.  Using a sterile surgical marker, an appropriate advancement flap was drawn incorporating the defect and placing the expected incisions within the relaxed skin tension lines where possible.    The area thus outlined was incised deep to adipose tissue with a #15 scalpel blade.  The skin margins were undermined to an appropriate distance in all directions utilizing iris scissors. O-L Flap Text: The defect edges were debeveled with a #10 scalpel blade.  Given the location of the defect, shape of the defect and the proximity to free margins an O-L flap was deemed most appropriate.  Using a sterile surgical marker, an appropriate advancement flap was drawn incorporating the defect and placing the expected incisions within the relaxed skin tension lines where possible.    The area thus outlined was incised deep to adipose tissue with a #10 scalpel blade.  The skin margins were undermined to an appropriate distance in all directions utilizing iris scissors. Trilobed Flap Text: The defect edges were debeveled with a #15 scalpel blade.  Given the location of the defect and the proximity to free margins a trilobed flap was deemed most appropriate.  Using a sterile surgical marker, an appropriate trilobed flap drawn around the defect.    The area thus outlined was incised deep to adipose tissue with a #15 scalpel blade.  The skin margins were undermined to an appropriate distance in all directions utilizing iris scissors. Date Of Previous Biopsy (Optional): 07/18/24 O-T Advancement Flap Text: The defect edges were debeveled with a #10 scalpel blade.  Given the location of the defect, shape of the defect and the proximity to free margins an O-T advancement flap was deemed most appropriate.  Using a sterile surgical marker, an appropriate advancement flap was drawn incorporating the defect and placing the expected incisions within the relaxed skin tension lines where possible.    The area thus outlined was incised deep to adipose tissue with a #10 scalpel blade.  The skin margins were undermined to an appropriate distance in all directions utilizing iris scissors. Alternatives Discussed Intro (Do Not Add Period): I discussed alternative treatments to Mohs surgery and specifically discussed the risks and benefits of Island Pedicle Flap With Canthal Suspension Text: The defect edges were debeveled with a #15 scalpel blade.  Given the location of the defect, shape of the defect and the proximity to free margins an island pedicle advancement flap was deemed most appropriate.  Using a sterile surgical marker, an appropriate advancement flap was drawn incorporating the defect, outlining the appropriate donor tissue and placing the expected incisions within the relaxed skin tension lines where possible. The area thus outlined was incised deep to adipose tissue with a #15 scalpel blade.  The skin margins were undermined to an appropriate distance in all directions around the primary defect and laterally outward around the island pedicle utilizing iris scissors.  There was minimal undermining beneath the pedicle flap. A suspension suture was placed in the canthal tendon to prevent tension and prevent ectropion. Subsequent Stages Histo Method Verbiage: Using a similar technique to that described above, a thin layer of tissue was removed from all areas where tumor was visible on the previous stage.  The tissue was again oriented, mapped, dyed, and processed as above. Mastoid Interpolation Flap Text: A decision was made to reconstruct the defect utilizing an interpolation axial flap and a staged reconstruction.  A telfa template was made of the defect.  This telfa template was then used to outline the mastoid interpolation flap.  The donor area for the pedicle flap was then injected with anesthesia.  The flap was excised through the skin and subcutaneous tissue down to the layer of the underlying musculature.  The pedicle flap was carefully excised within this deep plane to maintain its blood supply.  The edges of the donor site were undermined.   The donor site was closed in a primary fashion.  The pedicle was then rotated into position and sutured.  Once the tube was sutured into place, adequate blood supply was confirmed with blanching and refill.  The pedicle was then wrapped with xeroform gauze and dressed appropriately with a telfa and gauze bandage to ensure continued blood supply and protect the attached pedicle. Surgeon: Dilip Maurer MD Chonodrocutaneous Helical Advancement Flap Text: The defect edges were debeveled with a #15 scalpel blade.  Given the location of the defect and the proximity to free margins a chondrocutaneous helical advancement flap was deemed most appropriate.  Using a sterile surgical marker, the appropriate advancement flap was drawn incorporating the defect and placing the expected incisions within the relaxed skin tension lines where possible.    The area thus outlined was incised deep to adipose tissue with a #15 scalpel blade.  The skin margins were undermined to an appropriate distance in all directions utilizing iris scissors. Depth Of Tumor Invasion (For Histology): tumor not visualized (deep and peripheral margins are clear of tumor) Afx Histology Text: Present within the dermis is a highly cellular neoplasm composed of pleomorphic spindled and epithelioid cells. The cells are arranged in a haphazard fashion and associated with mitotic figures, including atypical forms. Graft Cartilage Fenestration Text: The cartilage was fenestrated with a 2mm punch biopsy to help facilitate graft survival and healing. Intermediate Repair And Flap Additional Text (Will Appearing After The Standard Complex Repair Text): The intermediate repair was not sufficient to completely close the primary defect. The remaining additional defect was repaired with the flap mentioned below. Mohs Rapid Report Verbiage: The area of clinically evident tumor was marked with skin marking ink and appropriately hatched.  The initial incision was made following the Mohs approach through the skin.  The specimen was taken to the lab, divided into the necessary number of pieces, chromacoded and processed according to the Mohs protocol.  This was repeated in successive stages until a tumor free defect was achieved. Hemostasis: Electrocautery Pinch Graft Text: The defect edges were debeveled with a #15 scalpel blade. Given the location of the defect, shape of the defect and the proximity to free margins a pinch graft was deemed most appropriate. Using a sterile surgical marker, the primary defect shape was transferred to the donor site. The area thus outlined was incised deep to adipose tissue with a #15 scalpel blade.  The harvested graft was then trimmed of adipose tissue until only dermis and epidermis was left. The skin margins of the secondary defect were undermined to an appropriate distance in all directions utilizing iris scissors.  The secondary defect was closed with interrupted buried subcutaneous sutures.  The skin edges were then re-apposed with running  sutures.  The skin graft was then placed in the primary defect and oriented appropriately. Paramedian Forehead Flap Text: A decision was made to reconstruct the defect utilizing an interpolation axial flap and a staged reconstruction.  A telfa template was made of the defect.  This telfa template was then used to outline the paramedian forehead pedicle flap.  The donor area for the pedicle flap was then injected with anesthesia.  The flap was excised through the skin and subcutaneous tissue down to the layer of the underlying musculature.  The pedicle flap was carefully excised within this deep plane to maintain its blood supply.  The edges of the donor site were undermined.   The donor site was closed in a primary fashion.  The pedicle was then rotated into position and sutured.  Once the tube was sutured into place, adequate blood supply was confirmed with blanching and refill.  The pedicle was then wrapped with xeroform gauze and dressed appropriately with a telfa and gauze bandage to ensure continued blood supply and protect the attached pedicle. Non-Graft Cartilage Fenestration Text: The cartilage was fenestrated with a 2mm punch biopsy to help facilitate healing. Crescentic Advancement Flap Text: The defect edges were debeveled with a #15 scalpel blade.  Given the location of the defect and the proximity to free margins a crescentic advancement flap was deemed most appropriate.  Using a sterile surgical marker, the appropriate advancement flap was drawn incorporating the defect and placing the expected incisions within the relaxed skin tension lines where possible.    The area thus outlined was incised deep to adipose tissue with a #15 scalpel blade.  The skin margins were undermined to an appropriate distance in all directions utilizing iris scissors. Adjacent Tissue Transfer Text: The defect edges were debeveled with a #15 scalpel blade.  Given the location of the defect and the proximity to free margins an adjacent tissue transfer was deemed most appropriate.  Using a sterile surgical marker, an appropriate flap was drawn incorporating the defect and placing the expected incisions within the relaxed skin tension lines where possible.    The area thus outlined was incised deep to adipose tissue with a #15 scalpel blade.  The skin margins were undermined to an appropriate distance in all directions utilizing iris scissors. Home Suture Removal Text: Patient was provided instructions on removing sutures and will remove their sutures at home.  If they have any questions or difficulties they will call the office. Length To Time In Minutes Device Was In Place: 10

## 2024-08-12 NOTE — DISCHARGE NOTE ADULT - MEDICATION SUMMARY - MEDICATIONS TO STOP TAKING
EGD/COLONOSCOPY    DR. Arango    OFFICE NUMBER [id number]    FOLLOW UP APPOINTMENT: call office in one week for pathology results    REPEAT PROCEDURE IN 5 years     TEST ORDERED: NONE       What to Expect at Home    Your Recovery:EGD  The only discomfort after your EGD is generally limited to a mild soreness of the throat, which may last a day or two. Call your physician immediately if you have severe chest pain, shortness of breath or a temperature of 100 degrees or higher if taken orally.    Your Recovery: COLON   Your doctor will tell you when you can eat and do your other usual activitiesYour doctor will talk to you about when you will need your next colonoscopy. Your doctor can help you decide how often you need to be checked. This will depend on the results of your test and your risk for colorectal cancer.  After the test, you may be bloated or have gas pains. You may need to pass gas. If a biopsy was done or a polyp was removed, you may have streaks of blood in your stool (feces) for a few days.  This care sheet gives you a general idea about how long it will take for you to recover. But each person recovers at a different pace. Follow the steps below to get better as quickly as possible.    How can you care for yourself at home?  Activity  Rest as much as you need to after you go home.  You should be able to go back to your usual activities the day after the test.  Diet  Follow your doctor's directions for eating after the test.  Drink plenty of fluids (unless your doctor has told you not to).  Medications  If you have a sore throat the day after the test, use an over-the-counter spray to numb your throat.  Your doctor will tell you if and when you can restart your medicines. He or she will also give you instructions about taking any new medicines.  If you take blood thinners, such as warfarin (Coumadin), clopidogrel (Plavix), or aspirin, be sure to talk to your doctor. He or she will tell you if and when to  I will STOP taking the medications listed below when I get home from the hospital:  None

## 2024-09-05 ENCOUNTER — APPOINTMENT (OUTPATIENT)
Dept: ELECTROPHYSIOLOGY | Facility: CLINIC | Age: 79
End: 2024-09-05

## 2024-09-05 PROCEDURE — 93295 DEV INTERROG REMOTE 1/2/MLT: CPT

## 2024-09-05 PROCEDURE — 93296 REM INTERROG EVL PM/IDS: CPT

## 2024-09-11 NOTE — PATIENT PROFILE ADULT - BRADEN MOISTURE
Complicated staph aureus infection involving multiple sites. Patient noted to have eloped the hospital against medical advice whilst workup was still ongoing. ID will sign off in the interim, please do call us back if patient is re-admitted again.   
POST-OPERATIVE FOLLOW UP :  Surgeon: Dr. 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   MEDICATION: Tylenol 2-3x/day. Takes one oxy in AM when headache is at it's worst.     Incision   Locations: Biparieto-occipital  Redness: Denies  Swelling: Denies  Drainage: Denies  Fevers greater than 101: Denies  CDI    Appetite  Denies nausea/vomiting  Eating and drinking well, appetite is getting better day by day    Bowel Issues  Taking pain medication? Yes  Using Stool Softner? Yes  Increase fluid intake? Yes  Increase activity? Yes    Bladder issues  Difficulty initiating? Denies  Not emptied completely? Denies  Loss of control of urine/stool? Denies    DVT Assessment  Calf/foot/ankle swelling? Denies  Painful/muscle cramping? Denies  Warm skin? Denies  Leg discoloration? Denies     Reminded pt of her post-op appointment on 9/18.   
(4) rarely moist

## 2024-10-21 NOTE — ED ADULT NURSE NOTE - NS ED NURSE RECORD ANOTHER VITAL SIGN
Immunization(s) given during visit:     Immunizations Administered       Name Date Dose Route    Pneumococcal, PCV20, PREVNAR 20, (age 6w+), IM, 0.5mL 10/21/2024 0.5 mL Intramuscular    Site: Deltoid- Left    Lot: [id number]    NDC: 3442-7089-61             Patient instructed to remain in clinic for 20 minutes after injection and was advised to report any adverse reaction to me immediately.      Yes

## 2024-10-23 NOTE — ED ADULT NURSE NOTE - NS ED NURSE DISCH DISPOSITION
A1c 6.5% continue work on dietary modification diet exercise and weight loss.    Yearly diabetic eye exam   Admitted

## 2024-11-12 NOTE — ED PROVIDER NOTE - TOBACCO USE
Managed Care RN note    Verified patient name and     Ambulatory status:   independent,    The following information was discussed during this visit    SHAKIRA escobar  21  RISK   79%   ED/ADMIT   24  fall   OVERDUE microalbumin  LABS  gfr 40    a1c 5.1  SCREENING Mammo    Dexa    colonoscopy      LAST VISITS  You saw Osmar Cotton MD on 2024 for:  Office Visit  Abdominal Pain  Throat Problem  Leg Pain    FUTURE APPOINTMENTS   24 simon li  gastro  24 osmar cotton   25  eron hendrix cards     EPIISODES  none       No concerns   Return to the emergency department if you have severe palpitations, lightness, dizziness, chest pain.  I do believe you are probably having some type of paroxysmal cardiac arrhythmia that your Zio patch will probably .  For this reason follow-up cardiologist for further evaluation.   Never smoker

## 2024-11-14 NOTE — DISCHARGE NOTE ADULT - MEDICATION SUMMARY - MEDICATIONS TO TAKE
Patient converted to sinus rhythm in the 80s at 1750.   I will START or STAY ON the medications listed below when I get home from the hospital:    Symbicort 160 mcg-4.5 mcg/inh inhalation aerosol  -- 2 puff(s) by mouth once a day  -- Indication: For COPD (chronic obstructive pulmonary disease)    spironolactone 25 mg oral tablet  -- 0.5 tab(s) by mouth once a day  -- Indication: For Heart     aspirin 81 mg oral delayed release tablet  -- 1 tab(s) by mouth once a day  -- Indication: For Atrial fibrillation and flutter    enalapril 10 mg oral tablet  -- 1 tab(s) by mouth once a day  -- Indication: For Hypertension    apixaban 5 mg oral tablet  -- 1 tab(s) by mouth every 12 hours  -- Indication: For Afib     Lantus 100 units/mL subcutaneous solution  -- 8 unit(s) subcutaneous once a day (at bedtime)  -- Indication: For Diabetes     ZyrTEC 10 mg oral tablet  -- 1 tab(s) by mouth once a day  -- Indication: For Allergy     atorvastatin 20 mg oral tablet  -- 1 tab(s) by mouth once a day (at bedtime)  -- Indication: For Hld     Ventolin HFA 90 mcg/inh inhalation aerosol  -- 2 puff(s) inhaled 4 times a day, As Needed  -- Indication: For COPD (chronic obstructive pulmonary disease)    Spiriva 18 mcg inhalation capsule  -- 1 cap(s) inhaled once a day   -- Check with your doctor before becoming pregnant.  For inhalation only.  It is very important that you take or use this exactly as directed.  Do not skip doses or discontinue unless directed by your doctor.  Obtain medical advice before taking any non-prescription drugs as some may affect the action of this medication.    -- Indication: For COPD (chronic obstructive pulmonary disease)    budesonide-formoterol 160 mcg-4.5 mcg/inh inhalation aerosol  -- 2 puff(s) inhaled 2 times a day  -- Indication: For COPD (chronic obstructive pulmonary disease)    furosemide 20 mg oral tablet  -- 1 tab(s) by mouth once a day  -- Indication: For Heart failure     Pepcid AC 10 mg oral tablet  -- 1 tab(s) by mouth once a day  -- Indication: For Gerd    docusate sodium 100 mg oral capsule  -- 1 cap(s) by mouth 3 times a day  -- Indication: For Constipation    senna oral tablet  -- 2 tab(s) by mouth once a day (at bedtime)  -- Indication: For Constipation I will START or STAY ON the medications listed below when I get home from the hospital:    Symbicort 160 mcg-4.5 mcg/inh inhalation aerosol  -- 2 puff(s) by mouth once a day  -- Indication: For COPD (chronic obstructive pulmonary disease)    spironolactone 25 mg oral tablet  -- 0.5 tab(s) by mouth once a day  -- Indication: For Heart     aspirin 81 mg oral delayed release tablet  -- 1 tab(s) by mouth once a day  -- Indication: For Atrial fibrillation and flutter    enalapril 10 mg oral tablet  -- 1 tab(s) by mouth once a day  -- Indication: For Hypertension    apixaban 5 mg oral tablet  -- 1 tab(s) by mouth every 12 hours  -- Indication: For Afib     metFORMIN 500 mg oral tablet  -- 1 tab(s) by mouth once a day  -- Indication: For Dm    Lantus 100 units/mL subcutaneous solution  -- 8 unit(s) subcutaneous once a day (at bedtime)  -- Indication: For Diabetes     ZyrTEC 10 mg oral tablet  -- 1 tab(s) by mouth once a day  -- Indication: For Allergy     atorvastatin 20 mg oral tablet  -- 1 tab(s) by mouth once a day (at bedtime)  -- Indication: For Hld     Ventolin HFA 90 mcg/inh inhalation aerosol  -- 2 puff(s) inhaled 4 times a day, As Needed  -- Indication: For COPD (chronic obstructive pulmonary disease)    Spiriva 18 mcg inhalation capsule  -- 1 cap(s) inhaled once a day   -- Check with your doctor before becoming pregnant.  For inhalation only.  It is very important that you take or use this exactly as directed.  Do not skip doses or discontinue unless directed by your doctor.  Obtain medical advice before taking any non-prescription drugs as some may affect the action of this medication.    -- Indication: For COPD (chronic obstructive pulmonary disease)    budesonide-formoterol 160 mcg-4.5 mcg/inh inhalation aerosol  -- 2 puff(s) inhaled 2 times a day  -- Indication: For COPD (chronic obstructive pulmonary disease)    furosemide 20 mg oral tablet  -- 1 tab(s) by mouth once a day  -- Indication: For Heart failure     Pepcid AC 10 mg oral tablet  -- 1 tab(s) by mouth once a day  -- Indication: For Gerd    docusate sodium 100 mg oral capsule  -- 1 cap(s) by mouth 3 times a day  -- Indication: For Constipation    senna oral tablet  -- 2 tab(s) by mouth once a day (at bedtime)  -- Indication: For Constipation I will START or STAY ON the medications listed below when I get home from the hospital:    Symbicort 160 mcg-4.5 mcg/inh inhalation aerosol  -- 2 puff(s) by mouth once a day  -- Indication: For Asthma    spironolactone 25 mg oral tablet  -- 0.5 tab(s) by mouth once a day  -- Indication: For Heart failure    aspirin 81 mg oral delayed release tablet  -- 1 tab(s) by mouth once a day  -- Indication: For Prevent heart disease    enalapril 10 mg oral tablet  -- 1 tab(s) by mouth once a day  -- Indication: For Heart failure     apixaban 5 mg oral tablet  -- 1 tab(s) by mouth every 12 hours  -- Indication: For Atrial fibrillation and flutter    metFORMIN 500 mg oral tablet  -- 1 tab(s) by mouth once a day  -- Indication: For Diabetes     Lantus 100 units/mL subcutaneous solution  -- 8 unit(s) subcutaneous once a day (at bedtime)  -- Indication: For Diabetes     ZyrTEC 10 mg oral tablet  -- 1 tab(s) by mouth once a day  -- Indication: For Allergy     atorvastatin 20 mg oral tablet  -- 1 tab(s) by mouth once a day (at bedtime)  -- Indication: For High cholesterol     Ventolin HFA 90 mcg/inh inhalation aerosol  -- 2 puff(s) inhaled 4 times a day, As Needed  -- Indication: For Asthma    Spiriva 18 mcg inhalation capsule  -- 1 cap(s) inhaled once a day   -- Check with your doctor before becoming pregnant.  For inhalation only.  It is very important that you take or use this exactly as directed.  Do not skip doses or discontinue unless directed by your doctor.  Obtain medical advice before taking any non-prescription drugs as some may affect the action of this medication.    -- Indication: For Asthma    budesonide-formoterol 160 mcg-4.5 mcg/inh inhalation aerosol  -- 2 puff(s) inhaled 2 times a day  -- Indication: For Asthma    furosemide 20 mg oral tablet  -- 1 tab(s) by mouth once a day  -- Indication: For Heart failure     Pepcid AC 10 mg oral tablet  -- 1 tab(s) by mouth once a day  -- Indication: For Gerd    docusate sodium 100 mg oral capsule  -- 1 cap(s) by mouth 3 times a day  -- Indication: For Constipation    senna oral tablet  -- 2 tab(s) by mouth once a day (at bedtime)  -- Indication: For Constipation I will START or STAY ON the medications listed below when I get home from the hospital:    Symbicort 160 mcg-4.5 mcg/inh inhalation aerosol  -- 2 puff(s) by mouth once a day  -- Indication: For Asthma    spironolactone 25 mg oral tablet  -- 0.5 tab(s) by mouth once a day  -- Indication: For Heart failure    aspirin 81 mg oral delayed release tablet  -- 1 tab(s) by mouth once a day  -- Indication: For Prevent heart disease    enalapril 10 mg oral tablet  -- 1 tab(s) by mouth 2 times a day  -- Indication: For Heart failure    apixaban 5 mg oral tablet  -- 1 tab(s) by mouth every 12 hours  -- Indication: For Atrial fibrillation and flutter    metFORMIN 500 mg oral tablet  -- 1 tab(s) by mouth once a day  -- Indication: For Diabetes     Lantus 100 units/mL subcutaneous solution  -- 8 unit(s) subcutaneous once a day (at bedtime)  -- Indication: For Diabetes     ZyrTEC 10 mg oral tablet  -- 1 tab(s) by mouth once a day  -- Indication: For Allergy     atorvastatin 20 mg oral tablet  -- 1 tab(s) by mouth once a day (at bedtime)  -- Indication: For High cholesterol     Ventolin HFA 90 mcg/inh inhalation aerosol  -- 2 puff(s) inhaled 4 times a day, As Needed  -- Indication: For Asthma    Spiriva 18 mcg inhalation capsule  -- 1 cap(s) inhaled once a day   -- Check with your doctor before becoming pregnant.  For inhalation only.  It is very important that you take or use this exactly as directed.  Do not skip doses or discontinue unless directed by your doctor.  Obtain medical advice before taking any non-prescription drugs as some may affect the action of this medication.    -- Indication: For Asthma    budesonide-formoterol 160 mcg-4.5 mcg/inh inhalation aerosol  -- 2 puff(s) inhaled 2 times a day  -- Indication: For Asthma    furosemide 20 mg oral tablet  -- 1 tab(s) by mouth once a day  -- Indication: For Heart failure     Pepcid AC 10 mg oral tablet  -- 1 tab(s) by mouth once a day  -- Indication: For Gerd    docusate sodium 100 mg oral capsule  -- 1 cap(s) by mouth 3 times a day  -- Indication: For Constipation    senna oral tablet  -- 2 tab(s) by mouth once a day (at bedtime)  -- Indication: For Constipation

## 2024-12-05 NOTE — PHYSICAL THERAPY INITIAL EVALUATION ADULT - NS ASR RISK AREAS PT EVAL
Medication: Losartan passed protocol.   Last office visit date: 10/18/24  Next appointment scheduled?: Yes   Number of refills given: N/A     fall

## 2024-12-14 NOTE — PROGRESS NOTE ADULT - PROVIDER SPECIALTY LIST ADULT
Internal Medicine
No

## 2024-12-16 ENCOUNTER — NON-APPOINTMENT (OUTPATIENT)
Age: 79
End: 2024-12-16

## 2024-12-16 ENCOUNTER — APPOINTMENT (OUTPATIENT)
Dept: ELECTROPHYSIOLOGY | Facility: CLINIC | Age: 79
End: 2024-12-16

## 2024-12-16 VITALS — HEART RATE: 89 BPM | OXYGEN SATURATION: 96 % | DIASTOLIC BLOOD PRESSURE: 73 MMHG | SYSTOLIC BLOOD PRESSURE: 117 MMHG

## 2024-12-16 PROCEDURE — 93284 PRGRMG EVAL IMPLANTABLE DFB: CPT

## 2024-12-16 PROCEDURE — 93000 ELECTROCARDIOGRAM COMPLETE: CPT | Mod: XU

## 2024-12-18 ENCOUNTER — EMERGENCY (EMERGENCY)
Facility: HOSPITAL | Age: 79
LOS: 1 days | Discharge: HOME | End: 2024-12-18
Attending: EMERGENCY MEDICINE
Payer: MEDICARE

## 2024-12-18 VITALS
OXYGEN SATURATION: 96 % | DIASTOLIC BLOOD PRESSURE: 78 MMHG | HEART RATE: 94 BPM | WEIGHT: 164.91 LBS | TEMPERATURE: 98 F | SYSTOLIC BLOOD PRESSURE: 124 MMHG | HEIGHT: 62 IN

## 2024-12-18 DIAGNOSIS — Z90.49 ACQUIRED ABSENCE OF OTHER SPECIFIED PARTS OF DIGESTIVE TRACT: Chronic | ICD-10-CM

## 2024-12-18 LAB
ALBUMIN SERPL ELPH-MCNC: 3.9 G/DL — SIGNIFICANT CHANGE UP (ref 3.3–5)
ALP SERPL-CCNC: 115 U/L — SIGNIFICANT CHANGE UP (ref 40–120)
ALT FLD-CCNC: 16 U/L — SIGNIFICANT CHANGE UP (ref 10–45)
ANION GAP SERPL CALC-SCNC: 13 MMOL/L — SIGNIFICANT CHANGE UP (ref 5–17)
AST SERPL-CCNC: 18 U/L — SIGNIFICANT CHANGE UP (ref 10–40)
BASOPHILS # BLD AUTO: 0.06 K/UL — SIGNIFICANT CHANGE UP (ref 0–0.2)
BASOPHILS NFR BLD AUTO: 0.6 % — SIGNIFICANT CHANGE UP (ref 0–2)
BILIRUB SERPL-MCNC: 0.3 MG/DL — SIGNIFICANT CHANGE UP (ref 0.2–1.2)
BUN SERPL-MCNC: 24 MG/DL — HIGH (ref 7–23)
CALCIUM SERPL-MCNC: 9.4 MG/DL — SIGNIFICANT CHANGE UP (ref 8.4–10.5)
CHLORIDE SERPL-SCNC: 102 MMOL/L — SIGNIFICANT CHANGE UP (ref 96–108)
CO2 SERPL-SCNC: 24 MMOL/L — SIGNIFICANT CHANGE UP (ref 22–31)
CREAT SERPL-MCNC: 1.15 MG/DL — SIGNIFICANT CHANGE UP (ref 0.5–1.3)
EGFR: 49 ML/MIN/1.73M2 — LOW
EGFR: 49 ML/MIN/1.73M2 — LOW
EOSINOPHIL # BLD AUTO: 0.13 K/UL — SIGNIFICANT CHANGE UP (ref 0–0.5)
EOSINOPHIL NFR BLD AUTO: 1.3 % — SIGNIFICANT CHANGE UP (ref 0–6)
GAS PNL BLDV: SIGNIFICANT CHANGE UP
GLUCOSE SERPL-MCNC: 127 MG/DL — HIGH (ref 70–99)
HCT VFR BLD CALC: 37 % — SIGNIFICANT CHANGE UP (ref 34.5–45)
HGB BLD-MCNC: 12.1 G/DL — SIGNIFICANT CHANGE UP (ref 11.5–15.5)
IMM GRANULOCYTES NFR BLD AUTO: 0.6 % — SIGNIFICANT CHANGE UP (ref 0–0.9)
LIDOCAIN IGE QN: 12 U/L — SIGNIFICANT CHANGE UP (ref 7–60)
LYMPHOCYTES # BLD AUTO: 1.79 K/UL — SIGNIFICANT CHANGE UP (ref 1–3.3)
LYMPHOCYTES # BLD AUTO: 17.6 % — SIGNIFICANT CHANGE UP (ref 13–44)
MAGNESIUM SERPL-MCNC: 1.6 MG/DL — SIGNIFICANT CHANGE UP (ref 1.6–2.6)
MCHC RBC-ENTMCNC: 32.4 PG — SIGNIFICANT CHANGE UP (ref 27–34)
MCHC RBC-ENTMCNC: 32.7 G/DL — SIGNIFICANT CHANGE UP (ref 32–36)
MCV RBC AUTO: 98.9 FL — SIGNIFICANT CHANGE UP (ref 80–100)
MONOCYTES # BLD AUTO: 0.91 K/UL — HIGH (ref 0–0.9)
MONOCYTES NFR BLD AUTO: 8.9 % — SIGNIFICANT CHANGE UP (ref 2–14)
NEUTROPHILS # BLD AUTO: 7.24 K/UL — SIGNIFICANT CHANGE UP (ref 1.8–7.4)
NEUTROPHILS NFR BLD AUTO: 71 % — SIGNIFICANT CHANGE UP (ref 43–77)
NRBC # BLD: 0 /100 WBCS — SIGNIFICANT CHANGE UP (ref 0–0)
NRBC BLD-RTO: 0 /100 WBCS — SIGNIFICANT CHANGE UP (ref 0–0)
NT-PROBNP SERPL-SCNC: 230 PG/ML — SIGNIFICANT CHANGE UP (ref 0–300)
PHOSPHATE SERPL-MCNC: 2.6 MG/DL — SIGNIFICANT CHANGE UP (ref 2.5–4.5)
PLATELET # BLD AUTO: 196 K/UL — SIGNIFICANT CHANGE UP (ref 150–400)
POTASSIUM SERPL-MCNC: 4.7 MMOL/L — SIGNIFICANT CHANGE UP (ref 3.5–5.3)
POTASSIUM SERPL-SCNC: 4.7 MMOL/L — SIGNIFICANT CHANGE UP (ref 3.5–5.3)
PROT SERPL-MCNC: 7.8 G/DL — SIGNIFICANT CHANGE UP (ref 6–8.3)
RBC # BLD: 3.74 M/UL — LOW (ref 3.8–5.2)
RBC # FLD: 14.1 % — SIGNIFICANT CHANGE UP (ref 10.3–14.5)
SODIUM SERPL-SCNC: 139 MMOL/L — SIGNIFICANT CHANGE UP (ref 135–145)
TROPONIN T, HIGH SENSITIVITY RESULT: 19 NG/L — SIGNIFICANT CHANGE UP (ref 0–51)
WBC # BLD: 10.19 K/UL — SIGNIFICANT CHANGE UP (ref 3.8–10.5)
WBC # FLD AUTO: 10.19 K/UL — SIGNIFICANT CHANGE UP (ref 3.8–10.5)

## 2024-12-18 PROCEDURE — 71045 X-RAY EXAM CHEST 1 VIEW: CPT | Mod: 26

## 2024-12-18 PROCEDURE — 99285 EMERGENCY DEPT VISIT HI MDM: CPT

## 2024-12-18 RX ORDER — ACETAMINOPHEN 500 MG/5ML
1000 LIQUID (ML) ORAL ONCE
Refills: 0 | Status: COMPLETED | OUTPATIENT
Start: 2024-12-18 | End: 2024-12-18

## 2024-12-18 RX ADMIN — Medication 400 MILLIGRAM(S): at 23:20

## 2024-12-19 VITALS
HEART RATE: 85 BPM | DIASTOLIC BLOOD PRESSURE: 61 MMHG | TEMPERATURE: 98 F | SYSTOLIC BLOOD PRESSURE: 134 MMHG | RESPIRATION RATE: 18 BRPM | OXYGEN SATURATION: 98 %

## 2024-12-19 LAB
APPEARANCE UR: CLEAR — SIGNIFICANT CHANGE UP
BACTERIA # UR AUTO: NEGATIVE /HPF — SIGNIFICANT CHANGE UP
BILIRUB UR-MCNC: NEGATIVE — SIGNIFICANT CHANGE UP
CAST: 0 /LPF — SIGNIFICANT CHANGE UP (ref 0–4)
COLOR SPEC: YELLOW — SIGNIFICANT CHANGE UP
DIFF PNL FLD: ABNORMAL
FLUAV AG NPH QL: SIGNIFICANT CHANGE UP
FLUBV AG NPH QL: SIGNIFICANT CHANGE UP
GLUCOSE UR QL: NEGATIVE MG/DL — SIGNIFICANT CHANGE UP
KETONES UR-MCNC: NEGATIVE MG/DL — SIGNIFICANT CHANGE UP
LEUKOCYTE ESTERASE UR-ACNC: ABNORMAL
NITRITE UR-MCNC: NEGATIVE — SIGNIFICANT CHANGE UP
PH UR: 6.5 — SIGNIFICANT CHANGE UP (ref 5–8)
PROT UR-MCNC: SIGNIFICANT CHANGE UP MG/DL
RBC CASTS # UR COMP ASSIST: 78 /HPF — HIGH (ref 0–4)
RSV RNA NPH QL NAA+NON-PROBE: SIGNIFICANT CHANGE UP
SARS-COV-2 RNA SPEC QL NAA+PROBE: SIGNIFICANT CHANGE UP
SP GR SPEC: 1.02 — SIGNIFICANT CHANGE UP (ref 1–1.03)
SQUAMOUS # UR AUTO: 6 /HPF — HIGH (ref 0–5)
UROBILINOGEN FLD QL: 1 MG/DL — SIGNIFICANT CHANGE UP (ref 0.2–1)
WBC UR QL: 8 /HPF — HIGH (ref 0–5)

## 2024-12-19 PROCEDURE — 82962 GLUCOSE BLOOD TEST: CPT

## 2024-12-19 PROCEDURE — 87086 URINE CULTURE/COLONY COUNT: CPT

## 2024-12-19 PROCEDURE — 87637 SARSCOV2&INF A&B&RSV AMP PRB: CPT

## 2024-12-19 PROCEDURE — 84100 ASSAY OF PHOSPHORUS: CPT

## 2024-12-19 PROCEDURE — 80053 COMPREHEN METABOLIC PANEL: CPT

## 2024-12-19 PROCEDURE — 84484 ASSAY OF TROPONIN QUANT: CPT

## 2024-12-19 PROCEDURE — 81001 URINALYSIS AUTO W/SCOPE: CPT

## 2024-12-19 PROCEDURE — 96374 THER/PROPH/DIAG INJ IV PUSH: CPT | Mod: XU

## 2024-12-19 PROCEDURE — 83735 ASSAY OF MAGNESIUM: CPT

## 2024-12-19 PROCEDURE — 71045 X-RAY EXAM CHEST 1 VIEW: CPT

## 2024-12-19 PROCEDURE — 82803 BLOOD GASES ANY COMBINATION: CPT

## 2024-12-19 PROCEDURE — 82435 ASSAY OF BLOOD CHLORIDE: CPT

## 2024-12-19 PROCEDURE — 83605 ASSAY OF LACTIC ACID: CPT

## 2024-12-19 PROCEDURE — 85018 HEMOGLOBIN: CPT

## 2024-12-19 PROCEDURE — 84295 ASSAY OF SERUM SODIUM: CPT

## 2024-12-19 PROCEDURE — 82947 ASSAY GLUCOSE BLOOD QUANT: CPT

## 2024-12-19 PROCEDURE — 84132 ASSAY OF SERUM POTASSIUM: CPT

## 2024-12-19 PROCEDURE — 83880 ASSAY OF NATRIURETIC PEPTIDE: CPT

## 2024-12-19 PROCEDURE — 83690 ASSAY OF LIPASE: CPT

## 2024-12-19 PROCEDURE — 85014 HEMATOCRIT: CPT

## 2024-12-19 PROCEDURE — 74177 CT ABD & PELVIS W/CONTRAST: CPT | Mod: MC

## 2024-12-19 PROCEDURE — 74177 CT ABD & PELVIS W/CONTRAST: CPT | Mod: 26,MC

## 2024-12-19 PROCEDURE — 85025 COMPLETE CBC W/AUTO DIFF WBC: CPT

## 2024-12-19 PROCEDURE — 99285 EMERGENCY DEPT VISIT HI MDM: CPT | Mod: 25

## 2024-12-19 PROCEDURE — 82330 ASSAY OF CALCIUM: CPT

## 2024-12-20 ENCOUNTER — APPOINTMENT (OUTPATIENT)
Dept: SURGERY | Facility: CLINIC | Age: 79
End: 2024-12-20
Payer: MEDICARE

## 2024-12-20 VITALS
WEIGHT: 156.5 LBS | RESPIRATION RATE: 16 BRPM | TEMPERATURE: 97 F | BODY MASS INDEX: 28.8 KG/M2 | DIASTOLIC BLOOD PRESSURE: 66 MMHG | HEIGHT: 62 IN | SYSTOLIC BLOOD PRESSURE: 131 MMHG | HEART RATE: 93 BPM | OXYGEN SATURATION: 99 %

## 2024-12-20 DIAGNOSIS — Z93.3 COLOSTOMY STATUS: ICD-10-CM

## 2024-12-20 DIAGNOSIS — K63.2 FISTULA OF INTESTINE: ICD-10-CM

## 2024-12-20 LAB
CULTURE RESULTS: SIGNIFICANT CHANGE UP
SPECIMEN SOURCE: SIGNIFICANT CHANGE UP

## 2024-12-20 PROCEDURE — 99214 OFFICE O/P EST MOD 30 MIN: CPT

## 2024-12-20 RX ORDER — NYSTATIN 100MM UNIT
POWDER (EA) MISCELLANEOUS
Qty: 1 | Refills: 1 | Status: ACTIVE | COMMUNITY
Start: 2024-12-20 | End: 1900-01-01

## 2024-12-20 RX ORDER — FOAM BANDAGE 4" X 4"
6"X6" BANDAGE TOPICAL
Qty: 6 | Refills: 2 | Status: ACTIVE | COMMUNITY
Start: 2024-12-20 | End: 1900-01-01

## 2025-01-21 NOTE — PROGRESS NOTE ADULT - ASSESSMENT
PROGRESS NOTE       Raquel Marroquin is a 40 year old here for Office Visit, Establish Care, and Annual Exam   The patient presents for an annual exam to establish care.    General Health  They report no current health concerns and are generally in good health. Under OB-GYN care, had a Pap smear and pregnancy test last Thursday.    Family History and Cancer Screening  Significant family history of lung cancer and BRCA2 mutation. Under care of gynecologic oncologist, breast surgeon. Upcoming tests: CA-125 blood test and transvaginal ultrasound for ovarian cancer screening. Breast MRI planned for April or May, followed by a repeat mammogram in 6 months. Transvaginal ultrasound and CA-125 screening every 6 months. Appointment with breast surgeon in April.    Review of Systems  As documented above.    SDOH Never Smoker       Objective   Vitals:    01/21/25 1723   BP: 112/75   Pulse: 81   Resp: 17   SpO2: 99%   Weight: 96.7 kg (213 lb 3 oz)   Height: 5' 7\" (1.702 m)   PainSc:  0   BMI (Calculated): 33.39     Depression Screen  More Data Synopsis  PHQ2/9 Numeric Score  More data exists         12/16/2023 8/12/2023   PHQ 2/9 Numeric Score   Adult PHQ 2 Score 0 0   Adult PHQ 2 Interpretation No further screening needed No further screening needed      Details                     Physical Exam  Constitutional:       General: She is not in acute distress.     Appearance: Normal appearance.   HENT:      Head: Normocephalic and atraumatic.      Mouth/Throat:      Mouth: Mucous membranes are moist.      Pharynx: Oropharynx is clear. No posterior oropharyngeal erythema.   Eyes:      Extraocular Movements: Extraocular movements intact.      Conjunctiva/sclera: Conjunctivae normal.      Pupils: Pupils are equal, round, and reactive to light.   Cardiovascular:      Rate and Rhythm: Normal rate and regular rhythm.      Heart sounds: No murmur heard.  Pulmonary:      Effort: Pulmonary effort is normal.      Breath sounds:  Normal breath sounds. No wheezing.   Abdominal:      General: Abdomen is flat.      Palpations: Abdomen is soft.      Tenderness: There is no abdominal tenderness.   Musculoskeletal:         General: No swelling or deformity. Normal range of motion.   Skin:     General: Skin is warm and dry.      Findings: No rash.   Neurological:      General: No focal deficit present.      Mental Status: She is alert and oriented to person, place, and time.      Motor: No weakness.                  ASSESSMENT AND PLAN        1. Vitals reviewed. Patient's medications, allergies, past medical, surgical, social and family histories were reviewed and updated as appropriate. Checking labs. Screenings reviewed and updated. Vaccines offered; declined flu, COVID.    2. BRCA2 gene mutation positive  Following with oncology. Reviewed oncology notes.  -planning for transvaginal ultrasound and CA-125 screening every 6 months.   -Appointment with breast surgeon 4/2025    1. Well adult exam  -     Basic Metabolic Panel; Future  -     Glycohemoglobin; Future  -     Lipid Panel With Reflex; Future  2. BRCA2 gene mutation positive      Carlos Currie MD    Return in about 1 year (around 1/21/2026).         73 yo female with perforated diverticulitis s/p Lupe in July 2019 complicated by ECF and multiple abscess requiring IR drainage with most recent one done in Dec 2019, c/b rectal stump leak and fistula S/P endoscopic Ovesco "Bear claw" clip placement in December , presented from rehab with left abdominal pain after the IR drainage catheter fell off 1 week ago, found to have reaccumulation of left abdominal wall abscess measuring 9 x 1.8 cm, now s/p IR drain placement 1/9.     Plan:  PO Antibiotics  Monitor Drain output  Possible D/C to [facility]  [id number] 75 yo female with perforated diverticulitis s/p Lupe in July 2019 complicated by ECF and multiple abscess requiring IR drainage with most recent one done in Dec 2019, c/b rectal stump leak and fistula S/P endoscopic Ovesco "Bear claw" clip placement in December , presented from rehab with left abdominal pain after the IR drainage catheter fell off 1 week ago, found to have reaccumulation of left abdominal wall abscess measuring 9 x 1.8 cm, now s/p IR drain placement 1/9. Picture c/w persistence of the colon stump leak/fistula and failure of the Ovesco clip.     Plan:  PO Antibiotics  Monitor Drain output  Possible D/C to [facility]  [id number]

## 2025-01-22 NOTE — PROGRESS NOTE ADULT - PROBLEM SELECTOR PROBLEM 4
ORTHO Discharge Instructions:      --No lifting or bearing weight through your left arm  --Wear sling except for when showering or doing PT  --You are scheduled to begin outpatient physical therapy on 1/24 at 1:45 pm  --Aquacel dressing is waterproof; you may shower with it  --Use ice machine on left shoulder for at least 20 minutes every hour; do not put directly on skin  --Take Aspirin 81 mg every 12 hours for 30 days to help prevent blood clots  --Follow up with Dr. Juares on 2/6 at 1:45 pm as previously scheduled   Essential hypertension

## 2025-02-03 NOTE — ED PROVIDER NOTE - CHIEF COMPLAINT
Pt tolerated injections. Left deltoid was cleansed with alcohol prior to injections. No pain, burning, swelling or redness at the site of the injection. Patient instructed to remain in clinic for 15 minutes afterwards, and to report any adverse reactions.   The patient is a 78y Female complaining of abd pain

## 2025-03-17 ENCOUNTER — APPOINTMENT (OUTPATIENT)
Dept: ELECTROPHYSIOLOGY | Facility: CLINIC | Age: 80
End: 2025-03-17

## 2025-03-17 PROCEDURE — 93296 REM INTERROG EVL PM/IDS: CPT

## 2025-03-17 PROCEDURE — 93295 DEV INTERROG REMOTE 1/2/MLT: CPT

## 2025-06-17 NOTE — ED ADULT TRIAGE NOTE - HEIGHT IN FEET
Spoke with patient who was able to provide acceptable patient identifiers prior to start of conversation. Patient notified of urinalysis microscopic results that showed large amounts of hematuria per Dr. Morgan would like to proceed with conducting CT Urogram. Patient scheduled for CT Urogram on 07/24/2025 for 9:00AM [facility] location. Patient verbalized understanding, no further assistance needed.   5

## 2025-06-23 ENCOUNTER — EMERGENCY (EMERGENCY)
Facility: HOSPITAL | Age: 80
LOS: 1 days | End: 2025-06-23
Attending: EMERGENCY MEDICINE
Payer: MEDICARE

## 2025-06-23 VITALS
DIASTOLIC BLOOD PRESSURE: 59 MMHG | OXYGEN SATURATION: 98 % | SYSTOLIC BLOOD PRESSURE: 132 MMHG | HEART RATE: 83 BPM | HEIGHT: 62 IN | WEIGHT: 160.06 LBS | TEMPERATURE: 98 F | RESPIRATION RATE: 20 BRPM

## 2025-06-23 VITALS
SYSTOLIC BLOOD PRESSURE: 132 MMHG | TEMPERATURE: 98 F | HEART RATE: 98 BPM | RESPIRATION RATE: 18 BRPM | DIASTOLIC BLOOD PRESSURE: 77 MMHG | OXYGEN SATURATION: 97 %

## 2025-06-23 DIAGNOSIS — Z90.49 ACQUIRED ABSENCE OF OTHER SPECIFIED PARTS OF DIGESTIVE TRACT: Chronic | ICD-10-CM

## 2025-06-23 LAB
ALBUMIN SERPL ELPH-MCNC: 3.7 G/DL — SIGNIFICANT CHANGE UP (ref 3.3–5)
ALP SERPL-CCNC: 98 U/L — SIGNIFICANT CHANGE UP (ref 40–120)
ALT FLD-CCNC: 16 U/L — SIGNIFICANT CHANGE UP (ref 10–45)
ANION GAP SERPL CALC-SCNC: 13 MMOL/L — SIGNIFICANT CHANGE UP (ref 5–17)
APTT BLD: 26.9 SEC — SIGNIFICANT CHANGE UP (ref 26.1–36.8)
AST SERPL-CCNC: 12 U/L — SIGNIFICANT CHANGE UP (ref 10–40)
BASOPHILS # BLD AUTO: 0.05 K/UL — SIGNIFICANT CHANGE UP (ref 0–0.2)
BASOPHILS NFR BLD AUTO: 0.4 % — SIGNIFICANT CHANGE UP (ref 0–2)
BILIRUB SERPL-MCNC: 0.5 MG/DL — SIGNIFICANT CHANGE UP (ref 0.2–1.2)
BUN SERPL-MCNC: 16 MG/DL — SIGNIFICANT CHANGE UP (ref 7–23)
CALCIUM SERPL-MCNC: 9.4 MG/DL — SIGNIFICANT CHANGE UP (ref 8.4–10.5)
CHLORIDE SERPL-SCNC: 102 MMOL/L — SIGNIFICANT CHANGE UP (ref 96–108)
CO2 SERPL-SCNC: 25 MMOL/L — SIGNIFICANT CHANGE UP (ref 22–31)
CREAT SERPL-MCNC: 0.87 MG/DL — SIGNIFICANT CHANGE UP (ref 0.5–1.3)
EGFR: 68 ML/MIN/1.73M2 — SIGNIFICANT CHANGE UP
EGFR: 68 ML/MIN/1.73M2 — SIGNIFICANT CHANGE UP
EOSINOPHIL # BLD AUTO: 0.13 K/UL — SIGNIFICANT CHANGE UP (ref 0–0.5)
EOSINOPHIL NFR BLD AUTO: 1 % — SIGNIFICANT CHANGE UP (ref 0–6)
FLUAV AG NPH QL: SIGNIFICANT CHANGE UP
FLUBV AG NPH QL: SIGNIFICANT CHANGE UP
GLUCOSE SERPL-MCNC: 147 MG/DL — HIGH (ref 70–99)
HCT VFR BLD CALC: 37.7 % — SIGNIFICANT CHANGE UP (ref 34.5–45)
HGB BLD-MCNC: 12.1 G/DL — SIGNIFICANT CHANGE UP (ref 11.5–15.5)
IMM GRANULOCYTES # BLD AUTO: 0.11 K/UL — HIGH (ref 0–0.07)
IMM GRANULOCYTES NFR BLD AUTO: 0.9 % — SIGNIFICANT CHANGE UP (ref 0–0.9)
INR BLD: 0.99 RATIO — SIGNIFICANT CHANGE UP (ref 0.85–1.16)
LYMPHOCYTES # BLD AUTO: 1.74 K/UL — SIGNIFICANT CHANGE UP (ref 1–3.3)
LYMPHOCYTES NFR BLD AUTO: 13.7 % — SIGNIFICANT CHANGE UP (ref 13–44)
MCHC RBC-ENTMCNC: 31.9 PG — SIGNIFICANT CHANGE UP (ref 27–34)
MCHC RBC-ENTMCNC: 32.1 G/DL — SIGNIFICANT CHANGE UP (ref 32–36)
MCV RBC AUTO: 99.5 FL — SIGNIFICANT CHANGE UP (ref 80–100)
MONOCYTES # BLD AUTO: 0.82 K/UL — SIGNIFICANT CHANGE UP (ref 0–0.9)
MONOCYTES NFR BLD AUTO: 6.4 % — SIGNIFICANT CHANGE UP (ref 2–14)
NEUTROPHILS # BLD AUTO: 9.88 K/UL — HIGH (ref 1.8–7.4)
NEUTROPHILS NFR BLD AUTO: 77.6 % — HIGH (ref 43–77)
NRBC # BLD AUTO: 0 K/UL — SIGNIFICANT CHANGE UP (ref 0–0)
NRBC # FLD: 0 K/UL — SIGNIFICANT CHANGE UP (ref 0–0)
NRBC BLD AUTO-RTO: 0 /100 WBCS — SIGNIFICANT CHANGE UP (ref 0–0)
PLATELET # BLD AUTO: 156 K/UL — SIGNIFICANT CHANGE UP (ref 150–400)
PMV BLD: 10.2 FL — SIGNIFICANT CHANGE UP (ref 7–13)
POTASSIUM SERPL-MCNC: 4 MMOL/L — SIGNIFICANT CHANGE UP (ref 3.5–5.3)
POTASSIUM SERPL-SCNC: 4 MMOL/L — SIGNIFICANT CHANGE UP (ref 3.5–5.3)
PROT SERPL-MCNC: 6.7 G/DL — SIGNIFICANT CHANGE UP (ref 6–8.3)
PROTHROM AB SERPL-ACNC: 11.4 SEC — SIGNIFICANT CHANGE UP (ref 9.9–13.4)
RBC # BLD: 3.79 M/UL — LOW (ref 3.8–5.2)
RBC # FLD: 14.3 % — SIGNIFICANT CHANGE UP (ref 10.3–14.5)
RSV RNA NPH QL NAA+NON-PROBE: SIGNIFICANT CHANGE UP
SARS-COV-2 RNA SPEC QL NAA+PROBE: SIGNIFICANT CHANGE UP
SODIUM SERPL-SCNC: 140 MMOL/L — SIGNIFICANT CHANGE UP (ref 135–145)
SOURCE RESPIRATORY: SIGNIFICANT CHANGE UP
URATE SERPL-MCNC: 4.5 MG/DL — SIGNIFICANT CHANGE UP (ref 2.5–7)
WBC # BLD: 12.73 K/UL — HIGH (ref 3.8–10.5)
WBC # FLD AUTO: 12.73 K/UL — HIGH (ref 3.8–10.5)

## 2025-06-23 PROCEDURE — 71260 CT THORAX DX C+: CPT

## 2025-06-23 PROCEDURE — 99285 EMERGENCY DEPT VISIT HI MDM: CPT | Mod: 25

## 2025-06-23 PROCEDURE — 0241U: CPT

## 2025-06-23 PROCEDURE — 84550 ASSAY OF BLOOD/URIC ACID: CPT

## 2025-06-23 PROCEDURE — 93971 EXTREMITY STUDY: CPT | Mod: 26,LT

## 2025-06-23 PROCEDURE — 80053 COMPREHEN METABOLIC PANEL: CPT

## 2025-06-23 PROCEDURE — 93971 EXTREMITY STUDY: CPT

## 2025-06-23 PROCEDURE — 83880 ASSAY OF NATRIURETIC PEPTIDE: CPT

## 2025-06-23 PROCEDURE — 71046 X-RAY EXAM CHEST 2 VIEWS: CPT

## 2025-06-23 PROCEDURE — 87637 SARSCOV2&INF A&B&RSV AMP PRB: CPT

## 2025-06-23 PROCEDURE — 85025 COMPLETE CBC W/AUTO DIFF WBC: CPT

## 2025-06-23 PROCEDURE — 93005 ELECTROCARDIOGRAM TRACING: CPT

## 2025-06-23 PROCEDURE — 93010 ELECTROCARDIOGRAM REPORT: CPT

## 2025-06-23 PROCEDURE — 99285 EMERGENCY DEPT VISIT HI MDM: CPT

## 2025-06-23 PROCEDURE — 85730 THROMBOPLASTIN TIME PARTIAL: CPT

## 2025-06-23 PROCEDURE — 71260 CT THORAX DX C+: CPT | Mod: 26

## 2025-06-23 PROCEDURE — 85610 PROTHROMBIN TIME: CPT

## 2025-06-23 PROCEDURE — 71046 X-RAY EXAM CHEST 2 VIEWS: CPT | Mod: 26

## 2025-06-23 NOTE — ED ADULT NURSE NOTE - NSICDXPASTSURGICALHX_GEN_ALL_CORE_FT
PAST SURGICAL HISTORY:  AICD (Automatic Cardioverter/Defibrillator) Present inserted in Aug, 2008. Due for battery change in 1 month. ( Access Closure) . Inserted by Dr Duffy    S/P cholecystectomy     Status post left hemicolectomy

## 2025-06-23 NOTE — ED ADULT NURSE NOTE - BIRTH SEX
Situation:  Patient call      Key Assessments:  1. Respiratory: had FU appt with Dr Choi, he recommended she start nebulizer TID at home to help with breathing. She has not yet started,\"not sure when I will do that\". Has a prednisone taper that she reviewed with RNCM and has no concerns.  2. Pain mgmt: will have nerve ablation therapy again in January, wondering if she should change other FU appts due to the scheduling right after her procedure.  3. Insurance: has [employer] rewards to complete, needs dates for clinical information.  4. Mood: she reports sleeping well in spite of significant other's health concerns. Her affect today was cheery and less repetitive than usual. She reported setting up medications independently and able to discard some unnecessary items from  Her home.    Actions Taken:  1. Respiratory: reviewed MD notes, encouraged patient by positive reinforcement of nebulizer use, discussed options to use within her daily routine. Therapeutic listening and empathy provided and allowed patient time to process how she may feel if she were to use it regularly.   2. Pain mgmt: reviewed appts and how the procedure affected her in past, she has decided to reschedule her dental and Pulm FU as she felt it will be too difficult. Reinforced with her that this was a good decision on her part.  3. Insurance: provided patient with dates of needed information \"now I can get a [employer] gc\".  4. Mood: discussed old medication disposal with patient, I did not reflect on her depression/anxiety sx today as she was in good spirits and we were discussing her home.    Plan:  FU in 3 weeks, assess nebulizer use/issues.    See hyperlinks within encounter for full documentation   Female

## 2025-06-23 NOTE — ED PROVIDER NOTE - PROGRESS NOTE DETAILS
CT chest showed no evidence of clot within the central veins. chronic CHARLETTE infection. chronic small airways process.  Results discussed with patient and their family. Informed of incidental findings including  hernia,  6 mm nonobstructive calculus within a left upper pole calyx.  Instructed the importance of following up with their PMD. Has an appointment with Cardiology on Thursday. Strict return precautions given. The patient expressed understanding and feels comfortable being discharged home. Labs showed leukocytosis at 12.73. No severe anemia. No evidence of severe electrolyte abnormalities, liver disease, or renal disorder. No severe coagulopathy. Normal uric acid. BNP minimally elevated at 324. Negative COVID/flu/RSV.   US negative for DVT. CXR showed left basilar patchy airspace opacities, likely atelectasis. Infection is   less likely.

## 2025-06-23 NOTE — ED ADULT NURSE NOTE - NSFALLRISKINTERV_ED_ALL_ED
Assistance OOB with selected safe patient handling equipment if applicable/Assistance with ambulation/Communicate fall risk and risk factors to all staff, patient, and family/Monitor gait and stability/Provide visual cue: yellow wristband, yellow gown, etc/Reinforce activity limits and safety measures with patient and family/Call bell, personal items and telephone in reach/Instruct patient to call for assistance before getting out of bed/chair/stretcher/Non-slip footwear applied when patient is off stretcher/[place] to call system/Physically safe environment - no spills, clutter or unnecessary equipment/Purposeful Proactive Rounding/Room/bathroom lighting operational, light cord in reach

## 2025-06-23 NOTE — ED PROVIDER NOTE - PHYSICAL EXAMINATION
INITIAL VITAL SIGNS: Reviewed by me.  GENERAL: In no acute distress.  HEAD: Normocephalic. Atraumatic.  EYES: EOMI. PERRL.  ENT: Moist mucous membranes. Oropharynx is clear.   NECK: Supple. No meningismus.   CV: Regular rate and rhythm. No murmurs, rubs, or gallops.  RESPIRATORY: Unlabored respirations. Clear to ascultation bilaterally.  ABDOMEN: Soft, non-distended, non-tender. No guarding or rebound. (+) Well healed scar. Ostomy bag in place with brown stool.   BACK: No CVA tenderness.  EXTREMITIES: No deformities. (+) 1+ edema to LUE, extending from hand to forearm. Non-tender. No warmth or erythema. Neurovascularly intact. 2+ pulses. Neurovascularly intact. 5/5 motor strength. No joint tenderness with FROM. (+) 1+ edema to BLE.   SKIN: Warm and dry. No rashes or petechiae.  NEURO: Grossly non-focal. INITIAL VITAL SIGNS: Reviewed by me.  GENERAL: In no acute distress.  HEAD: Normocephalic. Atraumatic.  EYES: EOMI. PERRL.  ENT: Moist mucous membranes. Oropharynx is clear.   NECK: Supple. No meningismus.   CV: Regular rate and rhythm. No murmurs, rubs, or gallops.  RESPIRATORY: Unlabored respirations. (+) Coarse breath sounds bilaterally   ABDOMEN: Soft, non-distended, non-tender. No guarding or rebound. (+) Well healed scar. Ostomy bag in place with brown stool.   BACK: No CVA tenderness.  EXTREMITIES: No deformities. (+) 1+ edema to LUE, extending from hand to forearm. Non-tender. No warmth or erythema. Neurovascularly intact. 2+ pulses. Neurovascularly intact. 5/5 motor strength. No joint tenderness with FROM. (+) 1+ edema to BLE.   SKIN: Warm and dry. No rashes or petechiae.  NEURO: Grossly non-focal. INITIAL VITAL SIGNS: Reviewed by me.  GENERAL: In no acute distress.  HEAD: Normocephalic. Atraumatic.  EYES: EOMI. PERRL.  ENT: Moist mucous membranes. Oropharynx is clear.   NECK: Supple. No meningismus.   CV: Regular rate and rhythm. No murmurs, rubs, or gallops.  RESPIRATORY: Unlabored respirations. (+) Coarse breath sounds bilaterally   CHEST WALL: (+) Pacemaker in place.   ABDOMEN: Soft, non-distended, non-tender. No guarding or rebound. (+) Well healed scar. Ostomy bag in place with brown stool.   BACK: No CVA tenderness.  EXTREMITIES: No deformities. (+) 1+ edema to LUE, extending from hand to forearm. Non-tender. No warmth or erythema. Neurovascularly intact. 2+ pulses. Neurovascularly intact. 5/5 motor strength. No joint tenderness with FROM. (+) 1+ edema to BLE.   SKIN: Warm and dry. No rashes or petechiae.  NEURO: Grossly non-focal.

## 2025-06-23 NOTE — ED PROVIDER NOTE - PATIENT PORTAL LINK FT
You can access the FollowMyHealth Patient Portal offered by [facility] by registering at the following website: [email or web address]. By joining [facility] [employer]’s FollowMyHealth portal, you will also be able to view your health information using other applications (apps) compatible with our system.

## 2025-06-23 NOTE — ED ADULT NURSE NOTE - OBJECTIVE STATEMENT
80-year-old Female & Advent with a history of aflutter on Eliquis, gout, diverticulitis s/p Lupe's, CAD s/p PM, HTN, CHF, TD2M, asthma/COPD, who presents with increased swelling to LUE since this morning. Denies swelling to her arm yesterday. No recent trauma or injury. Denies pain to the area. Has chronic swelling to BLE where currently taking diuretics, but never to her arms. Mentions recently finished a course of antibiotics and steroids for COPD/asthma exacerbation last week but no changes in home medication doses. Denies fever, chills, nausea, vomiting, chest pain, shortness of breath, abdominal pain, no changes in ostomy output.

## 2025-06-23 NOTE — ED PROVIDER NOTE - NSICDXPASTMEDICALHX_GEN_ALL_CORE_FT
PAST MEDICAL HISTORY:  Asthma     Atrial flutter     Cardiac Pacemaker     Cardiomyopathy     Congestive Heart Failure     Diabetes     Diverticulitis     Gout     HTN - Hypertension     Kidney stone     Patient is Yazidism     Refusal of blood transfusions as patient is Yazidism     Vertigo

## 2025-06-23 NOTE — ED PROVIDER NOTE - NSFOLLOWUPINSTRUCTIONS_ED_ALL_ED_FT
YOU WERE SEEN IN THE ED FOR: left arm swelling  You ultrasound and CT chest did not show a blood clot.   Your test results did not reveal any immediately concerning abnormalities.  Your results does not appear to be anything immediately dangerous.   Please follow up with [your PCP/a specialist] regarding the incidental findings seen on your imaging (please see discharge paperwork for results).    Follow up with your doctor this week. You have an appointment with your cardiologist on Thursday.     Return to the ED immediately for new or worsening symptoms: fever, vomiting, lightheadedness, difficulty breathing, chest pain, increased swelling, pain, redness.     Please continue to take any home medications as prescribed  Your test results from your ED visit were discussed with you prior to discharge  You were provided with a copy of your test results

## 2025-06-23 NOTE — ED PROVIDER NOTE - CLINICAL SUMMARY MEDICAL DECISION MAKING FREE TEXT BOX
RGUJRAL 72 female history listed brought in by her daughter for left upper extremity swelling times this morning.  States that she woke up with symptoms denies any trauma pain weakness numbness tingling neck pain headache chest pain shortness of breath.  Patient has noted swelling in her lower extremity due to history of heart failure not in her left upper extremity.  Symptoms do not present as her gout.  On exam patient is well-appearing speaking full sentences.  Positive cough with coarse breath sounds bilaterally.  Abdomen soft nontender.  Left chest wall with a pacemaker in place nontender at site.  Left upper extremity swollen increased from the elbow distally to the hand.  No overlying erythema or warmth concerning for cellulitis.  Positive swelling to the dorsum of the hand and forearm.  Full range of motion of wrist and elbow anticoagulation is compliant.  Patient was treated recently for cough with steroids and antibiotics patient had outpatient duplexes 1 week ago for lower extremity that were negative for DVT.  Differential includes rule out blood clot low suspicion for cellulitis low suspicion for gouty flare.  Will obtain ultrasound labs consider CT chest if findings and monitor.

## 2025-06-23 NOTE — ED PROVIDER NOTE - OBJECTIVE STATEMENT
80-year-old Female & [employer] with a history of aflutter on Eliquis, gout, diverticulitis s/p Lupe's, CAD s/p PM, HTN, CHF, TD2M, asthma/COPD, who presents with increased swelling to LUE since this morning. Denies swelling to her arm yesterday. No recent trauma or injury. Denies pain to the area. Has chronic swelling to BLE where currently taking diuretics, but never to her arms. Mentions recently finished a course of antibiotics and steroids for COPD/asthma exacerbation last week but no changes in home medication doses. Denies fever, chills, nausea, vomiting, chest pain, shortness of breath, abdominal pain, no changes in ostomy output. 80-year-old Female & [employer] with a history of aflutter on Eliquis, gout, diverticulitis s/p Lupe's, CAD s/p PM, HTN, CHF, TD2M, asthma/COPD, who presents with increased swelling to LUE since this morning. Denies swelling to her arm yesterday. No recent trauma or injury. Denies pain to the area. Has chronic swelling to BLE where currently taking diuretics, but never to her arms. Mentions recently finished a course of antibiotics and steroids for COPD/asthma exacerbation last week but no changes in home medication doses. Denies fever, chills, nausea, vomiting, chest pain, shortness of breath, abdominal pain, no changes in ostomy output.    EP: Aniket Raza   Cards: Tia Marie

## 2025-06-23 NOTE — ED PROVIDER NOTE - NSICDXPASTSURGICALHX_GEN_ALL_CORE_FT
PAST SURGICAL HISTORY:  AICD (Automatic Cardioverter/Defibrillator) Present inserted in Aug, 2008. Due for battery change in 1 month. ( [employer]) . Inserted by Dr Duffy    S/P cholecystectomy     Status post left hemicolectomy

## 2025-06-26 ENCOUNTER — NON-APPOINTMENT (OUTPATIENT)
Age: 80
End: 2025-06-26

## 2025-06-26 ENCOUNTER — APPOINTMENT (OUTPATIENT)
Dept: ELECTROPHYSIOLOGY | Facility: CLINIC | Age: 80
End: 2025-06-26

## 2025-06-26 VITALS
DIASTOLIC BLOOD PRESSURE: 72 MMHG | OXYGEN SATURATION: 97 % | HEART RATE: 79 BPM | SYSTOLIC BLOOD PRESSURE: 118 MMHG | BODY MASS INDEX: 28.35 KG/M2 | WEIGHT: 155 LBS

## 2025-06-26 PROCEDURE — 93284 PRGRMG EVAL IMPLANTABLE DFB: CPT

## 2025-06-26 PROCEDURE — 93000 ELECTROCARDIOGRAM COMPLETE: CPT | Mod: XU

## 2025-07-28 NOTE — PROGRESS NOTE ADULT - PROBLEM SELECTOR PROBLEM 3
"Anesthesia Transfer of Care Note    Patient: Ansley Bautista    Procedure(s) Performed: Procedure(s) (LRB):  COLONOSCOPY (N/A)    Patient location: GI    Anesthesia Type: general    Transport from OR: Transported from OR on room air with adequate spontaneous ventilation    Post pain: adequate analgesia    Post assessment: no apparent anesthetic complications    Post vital signs: stable    Level of consciousness: awake and alert    Nausea/Vomiting: no nausea/vomiting    Complications: none    Transfer of care protocol was followed      Last vitals: Visit Vitals  /82 (BP Location: Left arm, Patient Position: Lying)   Pulse 63   Temp 36.4 °C (97.5 °F) (Temporal)   Resp 18   Ht 5' 2" (1.575 m)   Wt 56.4 kg (124 lb 5.4 oz)   SpO2 97%   Breastfeeding No   BMI 22.74 kg/m²     "
Atrial flutter, paroxysmal
Atrial flutter, paroxysmal
Pyuria
HTN (hypertension)